# Patient Record
Sex: MALE | Race: OTHER | HISPANIC OR LATINO | Employment: FULL TIME | URBAN - METROPOLITAN AREA
[De-identification: names, ages, dates, MRNs, and addresses within clinical notes are randomized per-mention and may not be internally consistent; named-entity substitution may affect disease eponyms.]

---

## 2023-04-11 PROBLEM — R11.0 NAUSEA: Status: ACTIVE | Noted: 2023-04-11

## 2023-04-11 PROBLEM — E66.9 CLASS 2 OBESITY WITH BODY MASS INDEX (BMI) OF 38.0 TO 38.9 IN ADULT: Status: ACTIVE | Noted: 2023-04-11

## 2023-04-11 PROBLEM — M54.50 LOW BACK PAIN: Status: ACTIVE | Noted: 2023-04-11

## 2023-04-11 PROBLEM — R73.9 HYPERGLYCEMIA: Status: ACTIVE | Noted: 2023-04-11

## 2023-04-11 PROBLEM — E66.9 DIABETES MELLITUS TYPE 2 IN OBESE: Status: ACTIVE | Noted: 2023-04-11

## 2023-04-11 PROBLEM — E66.812 CLASS 2 OBESITY WITH BODY MASS INDEX (BMI) OF 38.0 TO 38.9 IN ADULT: Status: ACTIVE | Noted: 2023-04-11

## 2023-04-11 PROBLEM — M25.529 ELBOW PAIN: Status: ACTIVE | Noted: 2023-04-11

## 2023-04-11 PROBLEM — E55.9 VITAMIN D DEFICIENCY: Status: ACTIVE | Noted: 2023-04-11

## 2023-04-11 PROBLEM — E78.5 HYPERLIPIDEMIA: Status: ACTIVE | Noted: 2023-04-11

## 2023-04-11 PROBLEM — L98.9 SKIN LESIONS: Status: ACTIVE | Noted: 2023-04-11

## 2023-04-11 PROBLEM — M79.646 THUMB PAIN: Status: ACTIVE | Noted: 2023-04-11

## 2023-04-11 PROBLEM — B37.2 CANDIDAL INTERTRIGO: Status: ACTIVE | Noted: 2023-04-11

## 2023-04-11 PROBLEM — E11.69 DIABETES MELLITUS TYPE 2 IN OBESE: Status: ACTIVE | Noted: 2023-04-11

## 2023-04-11 PROBLEM — H00.019 STYE: Status: ACTIVE | Noted: 2023-04-11

## 2023-04-11 PROBLEM — F43.9 SITUATIONAL STRESS: Status: ACTIVE | Noted: 2023-04-11

## 2023-04-11 PROBLEM — E83.52 SERUM CALCIUM ELEVATED: Status: ACTIVE | Noted: 2023-04-11

## 2023-04-11 RX ORDER — FLAXSEED OIL 1000 MG
CAPSULE ORAL
COMMUNITY
End: 2023-06-30 | Stop reason: ALTCHOICE

## 2023-04-11 RX ORDER — SILDENAFIL 100 MG/1
100 TABLET, FILM COATED ORAL DAILY PRN
COMMUNITY
End: 2023-10-11 | Stop reason: SDUPTHER

## 2023-04-11 RX ORDER — DULAGLUTIDE 3 MG/.5ML
1 INJECTION, SOLUTION SUBCUTANEOUS
COMMUNITY
Start: 2018-08-29 | End: 2023-05-30 | Stop reason: SDUPTHER

## 2023-04-11 RX ORDER — ERGOCALCIFEROL 1.25 MG/1
50000 CAPSULE ORAL
COMMUNITY
Start: 2022-07-11 | End: 2023-06-02 | Stop reason: SDUPTHER

## 2023-04-11 RX ORDER — ASPIRIN 81 MG/1
1 TABLET ORAL DAILY
COMMUNITY
Start: 2016-05-31

## 2023-04-11 RX ORDER — ATORVASTATIN CALCIUM 40 MG/1
1 TABLET, FILM COATED ORAL DAILY
COMMUNITY
Start: 2018-08-29 | End: 2023-06-30 | Stop reason: SDUPTHER

## 2023-04-11 RX ORDER — IBUPROFEN 200 MG
CAPSULE ORAL DAILY
COMMUNITY

## 2023-04-11 RX ORDER — BLOOD SUGAR DIAGNOSTIC
STRIP MISCELLANEOUS 2 TIMES DAILY
COMMUNITY
End: 2023-10-11 | Stop reason: SDUPTHER

## 2023-04-11 RX ORDER — METFORMIN HYDROCHLORIDE 1000 MG/1
TABLET ORAL
COMMUNITY
Start: 2016-05-31 | End: 2023-04-26 | Stop reason: SDUPTHER

## 2023-04-11 RX ORDER — MULTIVITAMIN
1 TABLET ORAL DAILY
COMMUNITY

## 2023-05-12 ENCOUNTER — APPOINTMENT (OUTPATIENT)
Dept: PRIMARY CARE | Facility: CLINIC | Age: 63
End: 2023-05-12
Payer: COMMERCIAL

## 2023-05-30 DIAGNOSIS — E11.9 TYPE 2 DIABETES MELLITUS WITHOUT COMPLICATION, WITHOUT LONG-TERM CURRENT USE OF INSULIN (MULTI): Primary | ICD-10-CM

## 2023-05-30 RX ORDER — DULAGLUTIDE 3 MG/.5ML
1 INJECTION, SOLUTION SUBCUTANEOUS
Qty: 2 ML | Refills: 1 | Status: SHIPPED | OUTPATIENT
Start: 2023-05-30 | End: 2023-06-06 | Stop reason: SDUPTHER

## 2023-05-30 NOTE — TELEPHONE ENCOUNTER
----- Message from Tawana Florez sent at 5/26/2023  4:20 PM EDT -----  Regarding: REFILL  Refill request for Trulicity and Vitamin D. Send to [place] [id number] [address].

## 2023-06-02 DIAGNOSIS — E55.9 VITAMIN D DEFICIENCY: Primary | ICD-10-CM

## 2023-06-02 RX ORDER — ERGOCALCIFEROL 1.25 MG/1
50000 CAPSULE ORAL
Qty: 6 CAPSULE | Refills: 1 | Status: SHIPPED | OUTPATIENT
Start: 2023-06-02 | End: 2023-08-30 | Stop reason: SDUPTHER

## 2023-06-06 DIAGNOSIS — E11.9 TYPE 2 DIABETES MELLITUS WITHOUT COMPLICATION, WITHOUT LONG-TERM CURRENT USE OF INSULIN (MULTI): ICD-10-CM

## 2023-06-06 RX ORDER — DULAGLUTIDE 3 MG/.5ML
1 INJECTION, SOLUTION SUBCUTANEOUS
Qty: 2 ML | Refills: 1 | Status: SHIPPED | OUTPATIENT
Start: 2023-06-06 | End: 2023-06-30 | Stop reason: SDUPTHER

## 2023-06-21 ENCOUNTER — TELEPHONE (OUTPATIENT)
Dept: PRIMARY CARE | Facility: CLINIC | Age: 63
End: 2023-06-21
Payer: COMMERCIAL

## 2023-06-23 ENCOUNTER — TELEPHONE (OUTPATIENT)
Dept: PRIMARY CARE | Facility: CLINIC | Age: 63
End: 2023-06-23
Payer: COMMERCIAL

## 2023-06-23 NOTE — TELEPHONE ENCOUNTER
Patient called for status of his PA on Trulicity. I let him know that PCP should be signing it soon and I will call him when I received the signed copy and fax it in so that he is aware of the process.

## 2023-06-28 NOTE — TELEPHONE ENCOUNTER
Called patient and let him know that the PA was faxed over and his medication was approved. Patient picked up his medication last night.

## 2023-06-30 ENCOUNTER — TELEMEDICINE (OUTPATIENT)
Dept: PRIMARY CARE | Facility: CLINIC | Age: 63
End: 2023-06-30
Payer: COMMERCIAL

## 2023-06-30 DIAGNOSIS — E66.1 CLASS 2 DRUG-INDUCED OBESITY WITH SERIOUS COMORBIDITY AND BODY MASS INDEX (BMI) OF 36.0 TO 36.9 IN ADULT: Primary | ICD-10-CM

## 2023-06-30 DIAGNOSIS — E78.5 HYPERLIPIDEMIA, UNSPECIFIED HYPERLIPIDEMIA TYPE: ICD-10-CM

## 2023-06-30 DIAGNOSIS — E11.69 DIABETES MELLITUS TYPE 2 IN OBESE: ICD-10-CM

## 2023-06-30 DIAGNOSIS — E11.9 TYPE 2 DIABETES MELLITUS WITHOUT COMPLICATION, WITHOUT LONG-TERM CURRENT USE OF INSULIN (MULTI): ICD-10-CM

## 2023-06-30 DIAGNOSIS — E66.9 DIABETES MELLITUS TYPE 2 IN OBESE: ICD-10-CM

## 2023-06-30 PROBLEM — N52.1 ERECTILE DYSFUNCTION DUE TO DISEASES CLASSIFIED ELSEWHERE: Status: ACTIVE | Noted: 2023-06-30

## 2023-06-30 PROBLEM — E11.65 TYPE 2 DIABETES MELLITUS WITH HYPERGLYCEMIA (MULTI): Status: ACTIVE | Noted: 2023-06-30

## 2023-06-30 PROCEDURE — 99214 OFFICE O/P EST MOD 30 MIN: CPT | Performed by: NURSE PRACTITIONER

## 2023-06-30 RX ORDER — IBUPROFEN 600 MG/1
600 TABLET ORAL
COMMUNITY
Start: 2015-07-12 | End: 2023-11-29 | Stop reason: WASHOUT

## 2023-06-30 RX ORDER — ATORVASTATIN CALCIUM 40 MG/1
40 TABLET, FILM COATED ORAL DAILY
Qty: 90 TABLET | Refills: 1 | Status: SHIPPED | OUTPATIENT
Start: 2023-06-30 | End: 2023-12-27

## 2023-06-30 RX ORDER — DULAGLUTIDE 3 MG/.5ML
1 INJECTION, SOLUTION SUBCUTANEOUS
Qty: 2 ML | Refills: 2 | Status: SHIPPED | OUTPATIENT
Start: 2023-06-30 | End: 2023-09-15 | Stop reason: DRUGHIGH

## 2023-06-30 RX ORDER — METFORMIN HYDROCHLORIDE 1000 MG/1
TABLET ORAL
Qty: 225 TABLET | Refills: 0 | Status: SHIPPED | OUTPATIENT
Start: 2023-06-30 | End: 2023-10-11 | Stop reason: SDUPTHER

## 2023-06-30 NOTE — PROGRESS NOTES
Subjective   Patient ID: Jason Hollins is a 63 y.o. male who presents for Diabetes.  He has not have insurance for a few months. His old job was having problems with giving him insurance   He changed jobs and started a new job in march and was able to get insured in May   He is working in [facility] and is working in recovery /  His mother in law is in independent living now so it a lot easier at home    The long drive is how he relaxes  He misplaced his one touch monitor and will look for it   He had an eye exam not too long ago and got glasses for distance   He does not have neuropathy sx just his feet fall asleep if he sits too long   I will follow up with him in October .        Review of Systems    Objective   There were no vitals taken for this visit.    Physical Exam    Assessment/Plan   Problem List Items Addressed This Visit       Diabetes mellitus type 2 in obese (CMS/[facility])    Relevant Medications    metFORMIN (Glucophage) 1,000 mg tablet    Other Relevant Orders    Comprehensive Metabolic Panel    Hemoglobin A1C    Cholesterol, LDL Direct    Urinalysis with Reflex Microscopic    Albumin, urine, random    Hyperlipidemia    Relevant Medications    atorvastatin (Lipitor) 40 mg tablet     Other Visit Diagnoses       Class 2 drug-induced obesity with serious comorbidity and body mass index (BMI) of 36.0 to 36.9 in adult    -  Primary

## 2023-07-14 ENCOUNTER — TELEPHONE (OUTPATIENT)
Dept: PRIMARY CARE | Facility: CLINIC | Age: 63
End: 2023-07-14
Payer: COMMERCIAL

## 2023-07-14 DIAGNOSIS — M54.50 ACUTE RIGHT-SIDED LOW BACK PAIN WITHOUT SCIATICA: Primary | ICD-10-CM

## 2023-07-14 RX ORDER — TIZANIDINE HYDROCHLORIDE 4 MG/1
4 CAPSULE, GELATIN COATED ORAL 3 TIMES DAILY
Qty: 40 CAPSULE | Refills: 0 | Status: SHIPPED | OUTPATIENT
Start: 2023-07-14 | End: 2023-10-11 | Stop reason: SDUPTHER

## 2023-07-14 NOTE — TELEPHONE ENCOUNTER
Patient c/o tense muscle on right hip and buttocks area. Hard to sit down and sleep. Patient advised to go to ER or UC. Patient said ok.

## 2023-07-17 ENCOUNTER — TELEPHONE (OUTPATIENT)
Dept: PRIMARY CARE | Facility: CLINIC | Age: 63
End: 2023-07-17
Payer: COMMERCIAL

## 2023-08-17 ENCOUNTER — OFFICE VISIT (OUTPATIENT)
Dept: PRIMARY CARE | Facility: CLINIC | Age: 63
End: 2023-08-17
Payer: COMMERCIAL

## 2023-08-17 VITALS
SYSTOLIC BLOOD PRESSURE: 98 MMHG | HEIGHT: 71 IN | BODY MASS INDEX: 37.69 KG/M2 | DIASTOLIC BLOOD PRESSURE: 62 MMHG | WEIGHT: 269.2 LBS | RESPIRATION RATE: 15 BRPM | TEMPERATURE: 97 F | HEART RATE: 98 BPM | OXYGEN SATURATION: 98 %

## 2023-08-17 DIAGNOSIS — M53.3 BACK PAIN, SACROILIAC: Primary | ICD-10-CM

## 2023-08-17 PROCEDURE — 99213 OFFICE O/P EST LOW 20 MIN: CPT | Performed by: STUDENT IN AN ORGANIZED HEALTH CARE EDUCATION/TRAINING PROGRAM

## 2023-08-17 PROCEDURE — 3074F SYST BP LT 130 MM HG: CPT | Performed by: STUDENT IN AN ORGANIZED HEALTH CARE EDUCATION/TRAINING PROGRAM

## 2023-08-17 PROCEDURE — 3078F DIAST BP <80 MM HG: CPT | Performed by: STUDENT IN AN ORGANIZED HEALTH CARE EDUCATION/TRAINING PROGRAM

## 2023-08-17 PROCEDURE — 3008F BODY MASS INDEX DOCD: CPT | Performed by: STUDENT IN AN ORGANIZED HEALTH CARE EDUCATION/TRAINING PROGRAM

## 2023-08-17 PROCEDURE — 1036F TOBACCO NON-USER: CPT | Performed by: STUDENT IN AN ORGANIZED HEALTH CARE EDUCATION/TRAINING PROGRAM

## 2023-08-17 ASSESSMENT — PATIENT HEALTH QUESTIONNAIRE - PHQ9
1. LITTLE INTEREST OR PLEASURE IN DOING THINGS: NOT AT ALL
SUM OF ALL RESPONSES TO PHQ9 QUESTIONS 1 & 2: 0
2. FEELING DOWN, DEPRESSED OR HOPELESS: NOT AT ALL

## 2023-08-17 ASSESSMENT — LIFESTYLE VARIABLES
AUDIT-C TOTAL SCORE: 0
HOW OFTEN DO YOU HAVE A DRINK CONTAINING ALCOHOL: NEVER
HOW MANY STANDARD DRINKS CONTAINING ALCOHOL DO YOU HAVE ON A TYPICAL DAY: PATIENT DOES NOT DRINK
HOW OFTEN DO YOU HAVE SIX OR MORE DRINKS ON ONE OCCASION: NEVER
SKIP TO QUESTIONS 9-10: 1

## 2023-08-17 ASSESSMENT — ENCOUNTER SYMPTOMS: MUSCULOSKELETAL PAIN: 1

## 2023-08-17 NOTE — PATIENT INSTRUCTIONS
X-ray of the low back today  I have also ordered a CT of the pelvis.  We will inform you once the results are available.  Mary Arenas is also ordered blood work for you.  Please complete the blood work and follow-up with PCP.

## 2023-08-17 NOTE — PROGRESS NOTES
Subjective   Patient ID: Jason Hollins is a pleasant 63 y.o. male who presents for Muscle Pain.  Muscle Pain      Patient is here due to concern for pain in the right buttocks area.  He has been experiencing difficulty with sitting down and sleeping.  Reports that he noted large hard swelling/mass in the right buttocks area about 7 weeks ago.  He was prescribed tizanidine by PCP about a month ago which did not alleviate his pain.  He also sees a chiropractor.  No prior history of back trauma.  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  He also takes ibuprofen 600 mg 2 times a day which is somewhat alleviates the pain.    Review of Systems   All other systems reviewed and are negative.      Visit Vitals  BP 98/62   Pulse 98   Temp 36.1 °C (97 °F)   Resp 15          Objective   Physical Exam  Constitutional:       General: He is not in acute distress.     Appearance: Normal appearance.   HENT:      Head: Normocephalic and atraumatic.   Eyes:      General: No scleral icterus.     Conjunctiva/sclera: Conjunctivae normal.   Cardiovascular:      Rate and Rhythm: Normal rate and regular rhythm.      Heart sounds: Normal heart sounds.   Pulmonary:      Effort: Pulmonary effort is normal.      Breath sounds: Normal breath sounds. No wheezing.   Abdominal:      General: Bowel sounds are normal. There is no distension.      Palpations: Abdomen is soft.      Tenderness: There is no abdominal tenderness.   Musculoskeletal:         General: Swelling (large swelling in the right gluteal region with no overlying skin changes) present. Normal range of motion.      Cervical back: Neck supple.      Right lower leg: No edema.      Left lower leg: No edema.   Lymphadenopathy:      Cervical: No cervical adenopathy.   Skin:     General: Skin is warm and dry.   Neurological:      General: No focal deficit present.      Mental Status: He is alert and oriented to person, place, and time.   Psychiatric:         Mood and  Affect: Mood normal.         Behavior: Behavior normal.         Assessment/Plan   Problem List Items Addressed This Visit    None  Visit Diagnoses       Back pain, sacroiliac    -  Primary    Relevant Orders    XR sacroiliac joints 3+ views    CT pelvis w and wo IV contrast

## 2023-08-30 DIAGNOSIS — E55.9 VITAMIN D DEFICIENCY: ICD-10-CM

## 2023-08-30 RX ORDER — ERGOCALCIFEROL 1.25 MG/1
50000 CAPSULE ORAL
Qty: 6 CAPSULE | Refills: 1 | Status: SHIPPED | OUTPATIENT
Start: 2023-08-30 | End: 2023-10-11 | Stop reason: SDUPTHER

## 2023-09-01 ENCOUNTER — LAB (OUTPATIENT)
Dept: LAB | Facility: LAB | Age: 63
End: 2023-09-01
Payer: COMMERCIAL

## 2023-09-01 DIAGNOSIS — E11.69 DIABETES MELLITUS TYPE 2 IN OBESE: ICD-10-CM

## 2023-09-01 DIAGNOSIS — E66.9 DIABETES MELLITUS TYPE 2 IN OBESE: ICD-10-CM

## 2023-09-01 LAB
ALANINE AMINOTRANSFERASE (SGPT) (U/L) IN SER/PLAS: 39 U/L (ref 10–52)
ALBUMIN (G/DL) IN SER/PLAS: 4.3 G/DL (ref 3.4–5)
ALBUMIN (MG/L) IN URINE: 14.1 MG/L
ALBUMIN/CREATININE (UG/MG) IN URINE: 8.5 UG/MG CRT (ref 0–30)
ALKALINE PHOSPHATASE (U/L) IN SER/PLAS: 81 U/L (ref 33–136)
ANION GAP IN SER/PLAS: 15 MMOL/L (ref 10–20)
APPEARANCE, URINE: CLEAR
ASPARTATE AMINOTRANSFERASE (SGOT) (U/L) IN SER/PLAS: 22 U/L (ref 9–39)
BILIRUBIN TOTAL (MG/DL) IN SER/PLAS: 0.8 MG/DL (ref 0–1.2)
BILIRUBIN, URINE: NEGATIVE
BLOOD, URINE: NEGATIVE
CALCIUM (MG/DL) IN SER/PLAS: 10 MG/DL (ref 8.6–10.6)
CARBON DIOXIDE, TOTAL (MMOL/L) IN SER/PLAS: 24 MMOL/L (ref 21–32)
CHLORIDE (MMOL/L) IN SER/PLAS: 107 MMOL/L (ref 98–107)
CHOLESTEROL IN LDL (MG/DL) IN SER/PLAS BY DIRECT ASSAY: 42 MG/DL (ref 0–129)
COLOR, URINE: YELLOW
CREATININE (MG/DL) IN SER/PLAS: 0.87 MG/DL (ref 0.5–1.3)
CREATININE (MG/DL) IN URINE: 165 MG/DL (ref 20–370)
ESTIMATED AVERAGE GLUCOSE FOR HBA1C: 174 MG/DL
GFR MALE: >90 ML/MIN/1.73M2
GLUCOSE (MG/DL) IN SER/PLAS: 182 MG/DL (ref 74–99)
GLUCOSE, URINE: ABNORMAL MG/DL
HEMOGLOBIN A1C/HEMOGLOBIN TOTAL IN BLOOD: 7.7 %
KETONES, URINE: NEGATIVE MG/DL
LEUKOCYTE ESTERASE, URINE: NEGATIVE
NITRITE, URINE: NEGATIVE
PH, URINE: 5 (ref 5–8)
POTASSIUM (MMOL/L) IN SER/PLAS: 4.1 MMOL/L (ref 3.5–5.3)
PROTEIN TOTAL: 7 G/DL (ref 6.4–8.2)
PROTEIN, URINE: NEGATIVE MG/DL
SODIUM (MMOL/L) IN SER/PLAS: 142 MMOL/L (ref 136–145)
SPECIFIC GRAVITY, URINE: 1.02 (ref 1–1.03)
UREA NITROGEN (MG/DL) IN SER/PLAS: 17 MG/DL (ref 6–23)
UROBILINOGEN, URINE: <2 MG/DL (ref 0–1.9)

## 2023-09-01 PROCEDURE — 82570 ASSAY OF URINE CREATININE: CPT

## 2023-09-01 PROCEDURE — 83721 ASSAY OF BLOOD LIPOPROTEIN: CPT

## 2023-09-01 PROCEDURE — 81003 URINALYSIS AUTO W/O SCOPE: CPT

## 2023-09-01 PROCEDURE — 36415 COLL VENOUS BLD VENIPUNCTURE: CPT

## 2023-09-01 PROCEDURE — 80053 COMPREHEN METABOLIC PANEL: CPT

## 2023-09-01 PROCEDURE — 82043 UR ALBUMIN QUANTITATIVE: CPT

## 2023-09-01 PROCEDURE — 83036 HEMOGLOBIN GLYCOSYLATED A1C: CPT

## 2023-09-07 ENCOUNTER — TELEPHONE (OUTPATIENT)
Dept: PRIMARY CARE | Facility: CLINIC | Age: 63
End: 2023-09-07
Payer: COMMERCIAL

## 2023-09-07 DIAGNOSIS — L02.31 ABSCESS, GLUTEAL: Primary | ICD-10-CM

## 2023-09-12 ENCOUNTER — TELEPHONE (OUTPATIENT)
Dept: PRIMARY CARE | Facility: CLINIC | Age: 63
End: 2023-09-12
Payer: COMMERCIAL

## 2023-09-12 NOTE — TELEPHONE ENCOUNTER
Patient called to find out the results of his CT scan. Patient was given information about getting the MRI and scheduling with a general surgeon. Patient would like to know if he has an enlarged prostate.

## 2023-09-14 DIAGNOSIS — N40.0 PROSTATE ENLARGEMENT: Primary | ICD-10-CM

## 2023-09-15 ENCOUNTER — APPOINTMENT (OUTPATIENT)
Dept: PRIMARY CARE | Facility: CLINIC | Age: 63
End: 2023-09-15
Payer: COMMERCIAL

## 2023-09-15 ENCOUNTER — TELEMEDICINE (OUTPATIENT)
Dept: PHARMACY | Facility: HOSPITAL | Age: 63
End: 2023-09-15
Payer: COMMERCIAL

## 2023-09-15 DIAGNOSIS — E66.9 DIABETES MELLITUS TYPE 2 IN OBESE: ICD-10-CM

## 2023-09-15 DIAGNOSIS — E66.1 CLASS 2 DRUG-INDUCED OBESITY WITH SERIOUS COMORBIDITY AND BODY MASS INDEX (BMI) OF 36.0 TO 36.9 IN ADULT: ICD-10-CM

## 2023-09-15 DIAGNOSIS — E11.9 TYPE 2 DIABETES MELLITUS WITHOUT COMPLICATION, WITHOUT LONG-TERM CURRENT USE OF INSULIN (MULTI): ICD-10-CM

## 2023-09-15 DIAGNOSIS — E11.69 DIABETES MELLITUS TYPE 2 IN OBESE: ICD-10-CM

## 2023-09-15 RX ORDER — DULAGLUTIDE 4.5 MG/.5ML
4.5 INJECTION, SOLUTION SUBCUTANEOUS
Qty: 2 ML | Refills: 2 | Status: SHIPPED | OUTPATIENT
Start: 2023-09-15 | End: 2024-04-27

## 2023-09-15 NOTE — ASSESSMENT & PLAN NOTE
INCREASE to Trulicity 4.5 mg/0.5 mL - once weekly   CONTINUE:   Metformin 1000 mg - 1 tablet every morning and 1.5 tablet every evening

## 2023-09-15 NOTE — PROGRESS NOTES
"Subjective     Patient ID: Jason Hollins is a 63 y.o. male who presents for Diabetes.    Diabetes  He presents for his follow-up diabetic visit. He has type 2 diabetes mellitus. His disease course has been improving. He is compliant with treatment all of the time. He is following a generally unhealthy diet. He participates in exercise intermittently.       Has \"fallen off\" with his diet and exercise since changing jobs. Now works ~1 hour away. Continues to skip breakfast and sometimes lunch. Eats large dinner and then finds himself snacking afterward. Drinks only water and sometimes milk. Discussed finding healthier snack alternatives.     No Known Allergies    Objective     Current DM Pharmacotherapy:   Trulicity 3 mg/0.5 mL - once weekly (Tuesdays)   Metformin 1000 mg - 1 tablet every morning and 1.5 tablet every evening     SECONDARY PREVENTION  - Statin? Yes  - ACE-I/ARB? No  - Aspirin? Yes    Current monitoring regimen:   Patient is using: glucometer    SMBG Readings: Not currently testing    Any episodes of hypoglycemia? No  Hypoglycemia awareness? Yes    There were no vitals taken for this visit.    Pertinent PMH Review:  - PMH of Pancreatitis: No  - PMH/FH of Medullary Thyroid Cancer: No  - PMH of Retinopathy: No  - PMH of Urinary Tract Infections: No    Lab Review  Lab Results   Component Value Date    BILITOT 0.8 09/01/2023    CALCIUM 10.0 09/01/2023    CO2 24 09/01/2023     09/01/2023    CREATININE 0.87 09/01/2023    GLUCOSE 182 (H) 09/01/2023    ALKPHOS 81 09/01/2023    K 4.1 09/01/2023    PROT 7.0 09/01/2023     09/01/2023    AST 22 09/01/2023    ALT 39 09/01/2023    BUN 17 09/01/2023    ANIONGAP 15 09/01/2023    ALBUMIN 4.3 09/01/2023    GFRMALE >90 09/01/2023     Lab Results   Component Value Date    TRIG 90 12/30/2022    CHOL 89 12/30/2022    HDL 31.5 (A) 12/30/2022     Lab Results   Component Value Date    HGBA1C 7.7 (A) 09/01/2023     The ASCVD Risk score (Ravi DK, et al., 2019) failed to " calculate for the following reasons:    The valid total cholesterol range is 130 to 320 mg/dL      Assessment/Plan     Problem List Items Addressed This Visit       Diabetes mellitus type 2 in obese (CMS/[facility])     INCREASE to Trulicity 4.5 mg/0.5 mL - once weekly   CONTINUE:   Metformin 1000 mg - 1 tablet every morning and 1.5 tablet every evening          Other Visit Diagnoses       Class 2 drug-induced obesity with serious comorbidity and body mass index (BMI) of 36.0 to 36.9 in adult        Type 2 diabetes mellitus without complication, without long-term current use of insulin (CMS/[facility])                Type 2 diabetes mellitus, is not at goal. Goal A1C <7%    Follow up: I recommend diabetes care be 4 weeks.    Chloe YouD [facility]  Clinical Pharmacist     Continue all meds under the continuation of care with the referring provider and clinical pharmacy team

## 2023-09-18 ENCOUNTER — TELEPHONE (OUTPATIENT)
Dept: PRIMARY CARE | Facility: CLINIC | Age: 63
End: 2023-09-18
Payer: COMMERCIAL

## 2023-09-29 DIAGNOSIS — R22.2 MASS OF BUTTOCK: Primary | ICD-10-CM

## 2023-09-29 NOTE — PROGRESS NOTES
MRI results were reviewed with the patient. referral for Ortho oncology placed for tissue biopsy.  Patient will call central scheduling to request an appointment.

## 2023-10-06 ENCOUNTER — APPOINTMENT (OUTPATIENT)
Dept: PRIMARY CARE | Facility: CLINIC | Age: 63
End: 2023-10-06
Payer: COMMERCIAL

## 2023-10-09 ENCOUNTER — DOCUMENTATION (OUTPATIENT)
Dept: SURGERY | Facility: CLINIC | Age: 63
End: 2023-10-09
Payer: COMMERCIAL

## 2023-10-09 NOTE — PROGRESS NOTES
General Surgery Update    The patient was seen by me at my  [place] office on 9/27/2023 on referral from .   Please see the note(s) in legacy  [employer] for details.    The patient had the MRI performed on 9/29/2023.  I personally reviewed the report and the images.  As suspected, this revealed a complex heterogeneously enhancing, partially necrotic intramuscular right gluteus una mass concerning for malignancy.  Orthopedic oncology consultation was recommended, and I agree.    The referral was made by the patient's primary physician Dr. Durbin on 9/29/2023.  The patient is scheduled to see Dr. Mikal Hawkins from orthopedic oncology on 10/25/2023 for consultation.  The patient will follow with me only as needed.

## 2023-10-11 ENCOUNTER — TELEMEDICINE (OUTPATIENT)
Dept: PRIMARY CARE | Facility: CLINIC | Age: 63
End: 2023-10-11
Payer: COMMERCIAL

## 2023-10-11 DIAGNOSIS — N52.9 ERECTILE DYSFUNCTION, UNSPECIFIED ERECTILE DYSFUNCTION TYPE: ICD-10-CM

## 2023-10-11 DIAGNOSIS — M54.50 ACUTE RIGHT-SIDED LOW BACK PAIN WITHOUT SCIATICA: ICD-10-CM

## 2023-10-11 DIAGNOSIS — M79.18 BUTTOCK PAIN: ICD-10-CM

## 2023-10-11 DIAGNOSIS — E55.9 VITAMIN D DEFICIENCY: ICD-10-CM

## 2023-10-11 DIAGNOSIS — E66.1 CLASS 2 DRUG-INDUCED OBESITY WITH SERIOUS COMORBIDITY AND BODY MASS INDEX (BMI) OF 36.0 TO 36.9 IN ADULT: ICD-10-CM

## 2023-10-11 DIAGNOSIS — E66.1 CLASS 2 DRUG-INDUCED OBESITY WITH SERIOUS COMORBIDITY AND BODY MASS INDEX (BMI) OF 37.0 TO 37.9 IN ADULT: ICD-10-CM

## 2023-10-11 DIAGNOSIS — R22.2 MASS OF BUTTOCK: Primary | ICD-10-CM

## 2023-10-11 DIAGNOSIS — E66.9 DIABETES MELLITUS TYPE 2 IN OBESE: ICD-10-CM

## 2023-10-11 DIAGNOSIS — E11.69 DIABETES MELLITUS TYPE 2 IN OBESE: ICD-10-CM

## 2023-10-11 PROCEDURE — 99214 OFFICE O/P EST MOD 30 MIN: CPT | Performed by: NURSE PRACTITIONER

## 2023-10-11 RX ORDER — SILDENAFIL 100 MG/1
100 TABLET, FILM COATED ORAL DAILY PRN
Qty: 10 TABLET | Refills: 2 | Status: SHIPPED | OUTPATIENT
Start: 2023-10-11 | End: 2024-04-27

## 2023-10-11 RX ORDER — GABAPENTIN 100 MG/1
CAPSULE ORAL
Qty: 90 CAPSULE | Refills: 1 | Status: SHIPPED | OUTPATIENT
Start: 2023-10-11 | End: 2024-01-04 | Stop reason: SDUPTHER

## 2023-10-11 RX ORDER — IBUPROFEN 800 MG/1
800 TABLET ORAL 3 TIMES DAILY PRN
Qty: 180 TABLET | Refills: 3 | Status: SHIPPED | OUTPATIENT
Start: 2023-10-11 | End: 2024-04-27

## 2023-10-11 RX ORDER — METFORMIN HYDROCHLORIDE 1000 MG/1
TABLET ORAL
Qty: 225 TABLET | Refills: 0 | Status: SHIPPED | OUTPATIENT
Start: 2023-10-11 | End: 2024-04-27

## 2023-10-11 RX ORDER — TIZANIDINE HYDROCHLORIDE 4 MG/1
4 CAPSULE, GELATIN COATED ORAL 3 TIMES DAILY
Qty: 40 CAPSULE | Refills: 2 | Status: SHIPPED | OUTPATIENT
Start: 2023-10-11 | End: 2024-04-27

## 2023-10-11 RX ORDER — GABAPENTIN 300 MG/1
CAPSULE ORAL
Qty: 90 CAPSULE | Refills: 2 | Status: SHIPPED | OUTPATIENT
Start: 2023-10-11 | End: 2024-01-05 | Stop reason: SDUPTHER

## 2023-10-11 RX ORDER — ERGOCALCIFEROL 1.25 MG/1
50000 CAPSULE ORAL
Qty: 6 CAPSULE | Refills: 1 | Status: SHIPPED | OUTPATIENT
Start: 2023-10-11 | End: 2024-04-27

## 2023-10-11 RX ORDER — BLOOD SUGAR DIAGNOSTIC
STRIP MISCELLANEOUS
Qty: 100 STRIP | Refills: 3 | Status: SHIPPED | OUTPATIENT
Start: 2023-10-11 | End: 2024-04-27

## 2023-10-11 NOTE — PROGRESS NOTES
Subjective   Patient ID: Jason Hollins is a 63 y.o. male who presents for Tailbone Pain.    HPI   The patient has had a mass in his right gluteus muscle since the summer . It grew larger to the size of a golf ball . It is extremely painful when he is sitting and also standing   His leg is numb or feels that it is going to sleep and then he feels as if it is sweating . Sometimes the leg feels weak   He was seen by Dr Durbin and had a ct of the pelvis ordered showing a prominent cystic mass and also an enlarged prostate .  It was advised that he get an mri and so it was ordered and showed a 14 .9 x 12.7. x 8.3 encapsulated intramuscular solid lesion of the right gluteal maximm muscle  There were areas compatible with necrosis and also marrow edema . The impression is that this may be a malignant lesion and to see ortho oncology for tissue sampling .  He needs diabetic supplies   The trulicity one touch strips the meter and also metformin   His last aic was a little elevated but that may also reflect his pain his tumor         Review of Systems    Objective   There were no vitals taken for this visit.    Physical Exam    Assessment/Plan   Problem List Items Addressed This Visit             ICD-10-CM    Diabetes mellitus type 2 in obese (CMS/[facility]) E11.69, E66.9    Relevant Medications    dulaglutide (TRULICITY) 4.5 mg/0.5 mL pen injector    metFORMIN (Glucophage) 1,000 mg tablet    OneTouch Ultra Test strip    Other Relevant Orders    Home blood glucose meter    Low back pain M54.50    Relevant Medications    tiZANidine (Zanaflex) 4 mg capsule    gabapentin (Neurontin) 300 mg capsule    gabapentin (Neurontin) 100 mg capsule    Other Relevant Orders    Referral to Pain Medicine    Vitamin D deficiency E55.9    Relevant Medications    ergocalciferol (Vitamin D-2) 1.25 MG (29531 UT) capsule     Other Visit Diagnoses         Codes    Mass of buttock    -  Primary R22.2    Class 2 drug-induced obesity with serious comorbidity  and body mass index (BMI) of 36.0 to 36.9 in adult     E66.1, Z68.36    Buttock pain     M79.18    Relevant Medications    ibuprofen 800 mg tablet    gabapentin (Neurontin) 300 mg capsule    gabapentin (Neurontin) 100 mg capsule    Other Relevant Orders    Referral to Pain Medicine    Erectile dysfunction, unspecified erectile dysfunction type     N52.9    Relevant Medications    sildenafil (Viagra) 100 mg tablet    Class 2 drug-induced obesity with serious comorbidity and body mass index (BMI) of 37.0 to 37.9 in adult     E66.1, Z68.37

## 2023-10-12 NOTE — PATIENT INSTRUCTIONS
Jason   I have written the 100 mg gabapentin for you to start with then you can transition to the 300 mg gabapentin   You can use ibuprofen ,tylenol or acetaminophen 500 mg and also gabapentin 100 and then 300 mg  for the pain   You can sit on some cold compresses if that helps to numb the area   It is a journey you are on and this is not easy    ,there is the [facility] pain clinic you can go to for pain medication as well if it is a tumor that is cancerous origin  Ask the oncologist for a referral   You can call for a pain management referral as well to help with the pain [id number]

## 2023-10-13 ENCOUNTER — TELEMEDICINE (OUTPATIENT)
Dept: PHARMACY | Facility: HOSPITAL | Age: 63
End: 2023-10-13
Payer: COMMERCIAL

## 2023-10-13 DIAGNOSIS — E66.1 CLASS 2 DRUG-INDUCED OBESITY WITH SERIOUS COMORBIDITY AND BODY MASS INDEX (BMI) OF 36.0 TO 36.9 IN ADULT: ICD-10-CM

## 2023-10-13 DIAGNOSIS — E11.69 DIABETES MELLITUS TYPE 2 IN OBESE: ICD-10-CM

## 2023-10-13 DIAGNOSIS — E66.9 DIABETES MELLITUS TYPE 2 IN OBESE: ICD-10-CM

## 2023-10-13 NOTE — ASSESSMENT & PLAN NOTE
CONTINUE:   Trulicity 4.5 mg/0.5 mL - once weekly (Tuesdays)   Metformin 1000 mg - 1 tablet every morning and 1.5 tablet every evening

## 2023-10-13 NOTE — PROGRESS NOTES
Subjective     Patient ID: Jason Hollins is a 63 y.o. male who presents for No chief complaint on file..    Diabetes  He presents for his follow-up diabetic visit. He has type 2 diabetes mellitus. His disease course has been improving. He is compliant with treatment all of the time. He is following a generally unhealthy diet. He participates in exercise intermittently.     Diet has improved, exercise limited by schedule and cyst. Gabapentin helped greatly with the pain and has been able to get 5-6 hours of sleep per night.     Not on File    Objective     Current DM Pharmacotherapy:   Trulicity 4.5 mg/0.5 mL - once weekly (Tuesdays)   Metformin 1000 mg - 1 tablet every morning and 1.5 tablet every evening     SECONDARY PREVENTION  - Statin? Yes  - ACE-I/ARB? No  - Aspirin? Yes    Current monitoring regimen:   Patient is using: glucometer    SMBG Readings: Not currently testing    Any episodes of hypoglycemia? No  Hypoglycemia awareness? Yes    There were no vitals taken for this visit.    Pertinent PMH Review:  - PMH of Pancreatitis: No  - PMH/FH of Medullary Thyroid Cancer: No  - PMH of Retinopathy: No  - PMH of Urinary Tract Infections: No    Lab Review  Lab Results   Component Value Date    BILITOT 0.8 09/01/2023    CALCIUM 10.0 09/01/2023    CO2 24 09/01/2023     09/01/2023    CREATININE 0.87 09/01/2023    GLUCOSE 182 (H) 09/01/2023    ALKPHOS 81 09/01/2023    K 4.1 09/01/2023    PROT 7.0 09/01/2023     09/01/2023    AST 22 09/01/2023    ALT 39 09/01/2023    BUN 17 09/01/2023    ANIONGAP 15 09/01/2023    ALBUMIN 4.3 09/01/2023    GFRMALE >90 09/01/2023     Lab Results   Component Value Date    TRIG 90 12/30/2022    CHOL 89 12/30/2022    HDL 31.5 (A) 12/30/2022     Lab Results   Component Value Date    HGBA1C 7.7 (A) 09/01/2023     The ASCVD Risk score (Ravi DK, et al., 2019) failed to calculate for the following reasons:    The valid total cholesterol range is 130 to 320 mg/dL      Assessment/Plan      Problem List Items Addressed This Visit       Diabetes mellitus type 2 in obese (CMS/[facility])     CONTINUE:   Trulicity 4.5 mg/0.5 mL - once weekly (Tuesdays)   Metformin 1000 mg - 1 tablet every morning and 1.5 tablet every evening           Other Visit Diagnoses       Class 2 drug-induced obesity with serious comorbidity and body mass index (BMI) of 36.0 to 36.9 in adult                Type 2 diabetes mellitus, is not at goal. Goal A1C <7%    Follow up: I recommend diabetes care be 8 weeks.    Chloe YouD [facility]  Clinical Pharmacist     Continue all meds under the continuation of care with the referring provider and clinical pharmacy team

## 2023-10-24 DIAGNOSIS — M62.89 MUSCLE MASS: ICD-10-CM

## 2023-10-24 PROBLEM — B34.9 VIRAL ILLNESS: Status: ACTIVE | Noted: 2023-10-24

## 2023-10-25 ENCOUNTER — ANCILLARY PROCEDURE (OUTPATIENT)
Dept: RADIOLOGY | Facility: CLINIC | Age: 63
End: 2023-10-25
Payer: COMMERCIAL

## 2023-10-25 ENCOUNTER — OFFICE VISIT (OUTPATIENT)
Dept: ORTHOPEDIC SURGERY | Facility: CLINIC | Age: 63
End: 2023-10-25
Payer: COMMERCIAL

## 2023-10-25 VITALS — HEIGHT: 70 IN | BODY MASS INDEX: 37.37 KG/M2 | WEIGHT: 261 LBS

## 2023-10-25 DIAGNOSIS — R22.2 MASS OF BUTTOCK: ICD-10-CM

## 2023-10-25 DIAGNOSIS — M62.89 MUSCLE MASS: ICD-10-CM

## 2023-10-25 PROCEDURE — 99204 OFFICE O/P NEW MOD 45 MIN: CPT | Performed by: STUDENT IN AN ORGANIZED HEALTH CARE EDUCATION/TRAINING PROGRAM

## 2023-10-25 PROCEDURE — 72190 X-RAY EXAM OF PELVIS: CPT | Mod: FY

## 2023-10-25 PROCEDURE — 1036F TOBACCO NON-USER: CPT | Performed by: STUDENT IN AN ORGANIZED HEALTH CARE EDUCATION/TRAINING PROGRAM

## 2023-10-25 PROCEDURE — 3008F BODY MASS INDEX DOCD: CPT | Performed by: STUDENT IN AN ORGANIZED HEALTH CARE EDUCATION/TRAINING PROGRAM

## 2023-10-25 PROCEDURE — 3051F HG A1C>EQUAL 7.0%<8.0%: CPT | Performed by: STUDENT IN AN ORGANIZED HEALTH CARE EDUCATION/TRAINING PROGRAM

## 2023-10-25 PROCEDURE — 99214 OFFICE O/P EST MOD 30 MIN: CPT | Performed by: STUDENT IN AN ORGANIZED HEALTH CARE EDUCATION/TRAINING PROGRAM

## 2023-10-25 PROCEDURE — 72190 X-RAY EXAM OF PELVIS: CPT | Performed by: RADIOLOGY

## 2023-10-25 ASSESSMENT — PAIN SCALES - GENERAL: PAINLEVEL_OUTOF10: 6

## 2023-10-25 ASSESSMENT — ENCOUNTER SYMPTOMS
LOSS OF SENSATION IN FEET: 0
OCCASIONAL FEELINGS OF UNSTEADINESS: 0
DEPRESSION: 0

## 2023-10-25 ASSESSMENT — PAIN - FUNCTIONAL ASSESSMENT: PAIN_FUNCTIONAL_ASSESSMENT: 0-10

## 2023-10-25 NOTE — PROGRESS NOTES
Orthopaedic Surgery  New Patient Clinic Note    Jason Hollins [id number] October 25, 2023    Reason for Consult: R gluteal mass    HPI: 63 y.o. male presenting with large R gluteal mass.  First noticed in June.  States that it has been growing since then.  He initially presented to his Doctor told him to present to his PCP.  His PCP ordered local imaging studies including CT scan and MRI which was concerning for potential neoplasm for which she was referred to orthopedic oncology clinic.  He notes that the mass is very painful when standing and ambulating.  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  He does also endorse some numbness and tingling down his right leg.  No skin issues.  He does not have a personal history of cancer but notes that his father had a bone cancer which he passed away from though he does not know any specifics.  Past medical history includes diabetes but no other medical history.  He has no systemic symptoms.  No recent respiratory issues.    ROS:  A complete review of systems was conducted, pertinent only to the HPI noted above.     Constitutional: None     Eyes: No additions to above history     Ears, Nose, Throat: No additions to above history     Cardiovascular: No additions to above history     Respiratory: No additions to above history     GI: No additions to above history     : No additions to above history     Skin/Neuro: No additions to above history     Endocrine/Heme/Lymph: No additions to above history     Immunologic: No additions to above history     Psychiatric: No additions to above history     Musculoskeletal: see above    PMH:   Past Medical History:   Diagnosis Date    Morbid (severe) obesity due to excess calories (CMS/HCC)     Severe obesity (BMI 35.0-35.9 with comorbidity)    Morbid (severe) obesity due to excess calories (CMS/HCC) 05/31/2016    Severe obesity (BMI 35.0-39.9) with comorbidity    Personal history of other endocrine, nutritional  and metabolic disease     History of diabetes mellitus    Personal history of other endocrine, nutritional and metabolic disease     History of hyperlipidemia    No history of DVT. No personal history of cancer    PSH:    Past Surgical History:   Procedure Laterality Date    OTHER SURGICAL HISTORY  05/31/2016    History Of Prior Surgery       SHx: No smoking. No Alcohol, No IVDU    Meds:   Current Outpatient Medications on File Prior to Visit   Medication Sig Dispense Refill    aspirin 81 mg EC tablet Take 1 tablet (81 mg) by mouth once daily. AS DIRECTED.      atorvastatin (Lipitor) 40 mg tablet Take 1 tablet (40 mg) by mouth once daily. 90 tablet 1    blood sugar diagnostic (Blood Glucose Test) strip once daily. One Drop Test In Vitro Strip; Test      dulaglutide (Trulicity) 4.5 mg/0.5 mL pen injector Inject 4.5 mg under the skin 1 (one) time per week. 2 mL 2    dulaglutide (TRULICITY) 4.5 mg/0.5 mL pen injector INJECT ONE PEN (4.5 MG) UNDER THE SKIN ONCE WEEKLY 2 mL 2    ergocalciferol (Vitamin D-2) 1.25 MG (55826 UT) capsule Take 1 capsule (50,000 Units) by mouth every 14 (fourteen) days. twice monthly on the 15th and the 30 th for vitamin d deficiency 6 capsule 1    fish oil concentrate (Omega-3) 120-180 mg capsule Take 3 capsules (3,000 mg) by mouth.      gabapentin (Neurontin) 100 mg capsule Take one capsule and increase by 1 capsule every 4 days until taking 3 capsules 90 capsule 1    gabapentin (Neurontin) 300 mg capsule Use the 100 mg capsule to titrate to 300 mg , then use the 300 mg capsule and take 1 to 2 capsules hs for pain 90 capsule 2    ibuprofen 600 mg tablet Take 1 tablet (600 mg) by mouth.      ibuprofen 800 mg tablet Take 1 tablet (800 mg) by mouth 3 times a day as needed for mild pain (1 - 3) (pain). 180 tablet 3    metFORMIN (Glucophage) 1,000 mg tablet TAKE 1 TABLET BY MOUTH EVERY MORNING AND 1.5 TABLETS BY MOUTH EVERY EVENING , 225 tablet 0    multivitamin tablet Take 1 tablet by mouth once  daily.      OneTouch Ultra Test strip Test blood sugar once daily 100 strip 3    sildenafil (Viagra) 100 mg tablet Take 1 tablet (100 mg) by mouth once daily as needed (1 hour before needed). 10 tablet 2    tiZANidine (Zanaflex) 4 mg capsule Take 1 capsule (4 mg) by mouth 3 times a day. 40 capsule 2     No current facility-administered medications on file prior to visit.       PHYSICAL EXAM    GEN: AaOx4, NAD  HEENT: normocephalic atraumatic, EOMI, MMM, pupils equal and round  PSYCH: appropriate mood and affect  RESP: nonlabored breathing on RA  CARDIAC: Extremities WWP, RRR to peripheral palpation  NEURO: CN 2-12 grossly intact    RLE:   -There is a large 15 x 10 cm mass within the right gluteal compartment that is firm, nonmobile, and tender to the touch.  Skin well appearing. No erythema, swelling, bruising, open wounds.  - Hip ROM from 0-110. Ambulates without abnormal gait  -Motor intact in DF/PF/EHL/FHL, SILT in saph/sural/SPN/DPN/tibial distributions  -Foot wwp, brisk cap refill, 2+ DP/PT pulse    Imaging:    XR of the right hip, obtained and personally reviewed today, shows area of soft tissue swelling without any bony abnormality.    MRI with and without contrast of the pelvis and right hip was personally reviewed demonstrates a large heterogenous enhancing mass within the gluteus una muscle deep to the fascial compartment.  There is peripheral enhancement as well as areas of central necrosis.  It appears to be adjacent to but not encasing the sciatic nerve.    Assessment/Plan:  63 y.o. male presenting with right gluteal mass first noticed in June and enlarging since.  Discussed with the patient that clinical and imaging findings are concerning for potential aggressive neoplastic process.  Given this concern, we have recommended that he go for a stat CT-guided biopsy in order to ensure accurate biopsy with areas of necrosis.  Long discussion with the patient that findings from this biopsy will dictate  treatment down the road but that there is significant concern that this may be an aggressive cancer.  We will work to get this biopsy done as soon as possible and have the patient return to discuss the results once completed.

## 2023-10-27 ENCOUNTER — TUMOR BOARD CONFERENCE (OUTPATIENT)
Dept: HEMATOLOGY/ONCOLOGY | Facility: HOSPITAL | Age: 63
End: 2023-10-27
Payer: COMMERCIAL

## 2023-10-30 NOTE — TUMOR BOARD NOTE
Tumor Board Documentation    Jason Hollins was presented by Mikal Hawkins MD at our Tumor Board on 10/30/2023, which included representatives from Medical oncology, Radiation oncology, Surgical oncology, Pathology.    Jason currently presents as New patient initial presentation with history of the following treatments: None.    Additionally, we reviewed previous medical and familial history, history of present illness, and recent lab results along with all available histopathologic and imaging studies. The tumor board considered available treatment options and made the following recommendations:  Biopsy    The following procedures/referrals were also placed: No orders of the defined types were placed in this encounter.      Clinical Trial Status: Not discussed     National site-specific guidelines were discussed with respect to the case.

## 2023-11-13 ENCOUNTER — HOSPITAL ENCOUNTER (OUTPATIENT)
Dept: RADIOLOGY | Facility: HOSPITAL | Age: 63
Discharge: HOME | End: 2023-11-13
Payer: COMMERCIAL

## 2023-11-13 VITALS
SYSTOLIC BLOOD PRESSURE: 138 MMHG | BODY MASS INDEX: 38.51 KG/M2 | RESPIRATION RATE: 18 BRPM | HEART RATE: 100 BPM | OXYGEN SATURATION: 96 % | WEIGHT: 269 LBS | TEMPERATURE: 98.6 F | DIASTOLIC BLOOD PRESSURE: 85 MMHG | HEIGHT: 70 IN

## 2023-11-13 DIAGNOSIS — R22.2 MASS OF BUTTOCK: ICD-10-CM

## 2023-11-13 PROCEDURE — 77012 CT SCAN FOR NEEDLE BIOPSY: CPT

## 2023-11-13 PROCEDURE — 20206 BIOPSY MUSCLE PERQ NEEDLE: CPT | Performed by: RADIOLOGY

## 2023-11-13 PROCEDURE — 88307 TISSUE EXAM BY PATHOLOGIST: CPT | Performed by: PATHOLOGY

## 2023-11-13 PROCEDURE — 77012 CT SCAN FOR NEEDLE BIOPSY: CPT | Performed by: RADIOLOGY

## 2023-11-13 PROCEDURE — 88341 IMHCHEM/IMCYTCHM EA ADD ANTB: CPT | Mod: TC,SUR | Performed by: STUDENT IN AN ORGANIZED HEALTH CARE EDUCATION/TRAINING PROGRAM

## 2023-11-13 PROCEDURE — 88342 IMHCHEM/IMCYTCHM 1ST ANTB: CPT | Performed by: PATHOLOGY

## 2023-11-13 PROCEDURE — 88341 IMHCHEM/IMCYTCHM EA ADD ANTB: CPT | Performed by: PATHOLOGY

## 2023-11-13 PROCEDURE — 88341 IMHCHEM/IMCYTCHM EA ADD ANTB: CPT | Mod: TC | Performed by: STUDENT IN AN ORGANIZED HEALTH CARE EDUCATION/TRAINING PROGRAM

## 2023-11-13 RX ORDER — MIDAZOLAM HYDROCHLORIDE 1 MG/ML
INJECTION, SOLUTION INTRAMUSCULAR; INTRAVENOUS
Status: DISCONTINUED
Start: 2023-11-13 | End: 2023-11-13 | Stop reason: WASHOUT

## 2023-11-13 RX ORDER — FENTANYL CITRATE 50 UG/ML
INJECTION, SOLUTION INTRAMUSCULAR; INTRAVENOUS
Status: DISCONTINUED
Start: 2023-11-13 | End: 2023-11-13 | Stop reason: WASHOUT

## 2023-11-13 NOTE — DISCHARGE INSTRUCTIONS
Okay to remove band aid tomorrow  Okay to shower tomorrow  Follow up with Dr. Hawkins in 10 days for results  Resume normal diet   Notify MD with any s/s of infection or active bleeding

## 2023-11-13 NOTE — POST-PROCEDURE NOTE
Interventional Radiology Brief Postprocedure Note    Attending: Alessandro Domingo MD    Assistant: None    Diagnosis: right gluteal mass    Description of procedure: CT guided right gluteal mass biopsy     Anesthesia:  Local    Complications: None    Estimated Blood Loss: minimal    Medications (Filter: Administrations occurring from 0931 to 1018 on 11/13/23) As of 11/13/23 1018      None          ID Type Source Tests Collected by Time   1 : CT GUIDED RIGHT GLUTEAL MASS BIOPSY Tissue SOFT TISSUE MASS BIOPSY SURGICAL PATHOLOGY EXAM Alessandro Domingo MD 11/13/2023 0957         See detailed result report with images in PACS.    The patient tolerated the procedure well without incident or complication and is in stable condition.

## 2023-11-13 NOTE — PRE-PROCEDURE NOTE
Interventional Radiology Preprocedure Note    Indication for procedure: The encounter diagnosis was Mass of buttock.    Relevant review of systems: NA    Relevant Labs:   Lab Results   Component Value Date    CREATININE 0.87 09/01/2023       Planned Sedation/Anesthesia: Moderate    Airway assessment: normal    Directed physical examination:    ADDIE    Mallampati: I (soft palate, uvula, fauces, and tonsillar pillars visible)    ASA Score: ASA 1 - Normal health patient    Benefits, risks and alternatives of procedure and planned sedation have been discussed with the patient and/or their representative. All questions answered and they agree to proceed.

## 2023-11-17 ENCOUNTER — ANCILLARY PROCEDURE (OUTPATIENT)
Dept: RADIOLOGY | Facility: CLINIC | Age: 63
End: 2023-11-17
Payer: COMMERCIAL

## 2023-11-17 ENCOUNTER — OFFICE VISIT (OUTPATIENT)
Dept: ORTHOPEDIC SURGERY | Facility: HOSPITAL | Age: 63
End: 2023-11-17
Payer: COMMERCIAL

## 2023-11-17 VITALS — WEIGHT: 269 LBS | HEIGHT: 70 IN | BODY MASS INDEX: 38.51 KG/M2

## 2023-11-17 DIAGNOSIS — C49.9 SOFT TISSUE SARCOMA (MULTI): ICD-10-CM

## 2023-11-17 DIAGNOSIS — C49.9 SOFT TISSUE SARCOMA (MULTI): Primary | ICD-10-CM

## 2023-11-17 PROCEDURE — 3008F BODY MASS INDEX DOCD: CPT | Performed by: STUDENT IN AN ORGANIZED HEALTH CARE EDUCATION/TRAINING PROGRAM

## 2023-11-17 PROCEDURE — 99214 OFFICE O/P EST MOD 30 MIN: CPT | Performed by: STUDENT IN AN ORGANIZED HEALTH CARE EDUCATION/TRAINING PROGRAM

## 2023-11-17 PROCEDURE — 71250 CT THORAX DX C-: CPT

## 2023-11-17 PROCEDURE — 1036F TOBACCO NON-USER: CPT | Performed by: STUDENT IN AN ORGANIZED HEALTH CARE EDUCATION/TRAINING PROGRAM

## 2023-11-17 PROCEDURE — 99214 OFFICE O/P EST MOD 30 MIN: CPT | Mod: 25 | Performed by: STUDENT IN AN ORGANIZED HEALTH CARE EDUCATION/TRAINING PROGRAM

## 2023-11-17 PROCEDURE — 3051F HG A1C>EQUAL 7.0%<8.0%: CPT | Performed by: STUDENT IN AN ORGANIZED HEALTH CARE EDUCATION/TRAINING PROGRAM

## 2023-11-17 ASSESSMENT — PAIN - FUNCTIONAL ASSESSMENT: PAIN_FUNCTIONAL_ASSESSMENT: 0-10

## 2023-11-17 ASSESSMENT — PAIN SCALES - GENERAL: PAINLEVEL_OUTOF10: 4

## 2023-11-17 ASSESSMENT — ENCOUNTER SYMPTOMS
OCCASIONAL FEELINGS OF UNSTEADINESS: 0
DEPRESSION: 0
LOSS OF SENSATION IN FEET: 0

## 2023-11-17 NOTE — PROGRESS NOTES
Orthopaedic Surgery Clinic Progress Note:    CC: Right gluteal mass biopsy    S: 63 y.o. male with a right gluteal mass which we previously saw on MRI.  After his last visit we referred him for a biopsy.  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  For that reason we asked the patient to come in so we can discuss the neck steps in terms of imaging as well as referrals.  Patient states that he tolerated the biopsy well.  Is been no issues with the biopsy tract.  There is no change in size of the mass.    Objective:    Exam:  Gen: alert, appropriately conversational, no apparent distress  Chest: symmetric chest rise, non-labored breathing  Heart: RRR to peripheral palpation    RLE:   -There is a large 15 x 10 cm mass within the right gluteal compartment that is firm, nonmobile, and tender to the touch.  Skin well appearing. No erythema, swelling, bruising, open wounds.  - Hip ROM from 0-110. Ambulates without abnormal gait  -Motor intact in DF/PF/EHL/FHL, SILT in saph/sural/SPN/DPN/tibial distributions  -Foot wwp, brisk cap refill, 2+ DP/PT pulse  -Biopsy tract is healed without any evidence of drainage    Imaging:  XR of the right hip, obtained and personally reviewed today, shows area of soft tissue swelling without any bony abnormality.     MRI with and without contrast of the pelvis and right hip was personally reviewed demonstrates a large heterogenous enhancing mass within the gluteus una muscle deep to the fascial compartment.  There is peripheral enhancement as well as areas of central necrosis.  It appears to be adjacent to but not encasing the sciatic nerve.    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  We discussed preoperative versus postoperative radiation therapy as well as the logic behind both approaches.  We discussed the preoperative radiation therapy deftly has a higher chance of wound complications but the more targeted with a low field as well as a lower dose.  Postoperative radiation therapy has a lower chance wound complications with a higher doses with higher field.  We placed referrals for medical oncology as well as radiation oncology and he is getting get a CT scan of his chest.  He will be discussed at tumor board after the CT scan of the chest is obtained and my plan is to see him back after his radiation is complete to the plan for surgery.

## 2023-11-24 ENCOUNTER — TUMOR BOARD CONFERENCE (OUTPATIENT)
Dept: HEMATOLOGY/ONCOLOGY | Facility: HOSPITAL | Age: 63
End: 2023-11-24
Payer: COMMERCIAL

## 2023-11-24 NOTE — TUMOR BOARD NOTE
Tumor Board Documentation    Jason Hollins was presented by Mikal Hawkins MD at our Tumor Board on 11/24/2023, which included representatives from Medical oncology, Surgical oncology, Pathology, Radiology.    Jason currently presents as Current patient with history of the following treatments: None.    Additionally, we reviewed previous medical and familial history, history of present illness, and recent lab results along with all available histopathologic and imaging studies. The tumor board considered available treatment options and made the following recommendations:  Surgery, Concurrent chemo-radiation therapy (Preop RT/systemic tx followed by surgery)    The following procedures/referrals were also placed: No orders of the defined types were placed in this encounter.      Clinical Trial Status: None available     National site-specific guidelines were discussed with respect to the case.

## 2023-11-28 ENCOUNTER — HOSPITAL ENCOUNTER (OUTPATIENT)
Dept: RADIATION ONCOLOGY | Facility: CLINIC | Age: 63
Setting detail: RADIATION/ONCOLOGY SERIES
Discharge: HOME | End: 2023-11-28
Payer: COMMERCIAL

## 2023-11-28 ENCOUNTER — LAB (OUTPATIENT)
Dept: LAB | Facility: LAB | Age: 63
End: 2023-11-28
Payer: COMMERCIAL

## 2023-11-28 VITALS
RESPIRATION RATE: 18 BRPM | WEIGHT: 271 LBS | BODY MASS INDEX: 38.88 KG/M2 | HEART RATE: 94 BPM | DIASTOLIC BLOOD PRESSURE: 72 MMHG | SYSTOLIC BLOOD PRESSURE: 126 MMHG | TEMPERATURE: 97.3 F | OXYGEN SATURATION: 96 %

## 2023-11-28 DIAGNOSIS — E66.9 DIABETES MELLITUS TYPE 2 IN OBESE: ICD-10-CM

## 2023-11-28 DIAGNOSIS — N52.1 ERECTILE DYSFUNCTION DUE TO DISEASES CLASSIFIED ELSEWHERE: ICD-10-CM

## 2023-11-28 DIAGNOSIS — E11.69 DIABETES MELLITUS TYPE 2 IN OBESE: ICD-10-CM

## 2023-11-28 DIAGNOSIS — C49.9 SOFT TISSUE SARCOMA (MULTI): ICD-10-CM

## 2023-11-28 DIAGNOSIS — N52.1 ERECTILE DYSFUNCTION DUE TO DISEASES CLASSIFIED ELSEWHERE: Primary | ICD-10-CM

## 2023-11-28 LAB
ANION GAP SERPL CALC-SCNC: 12 MMOL/L (ref 10–20)
BUN SERPL-MCNC: 15 MG/DL (ref 6–23)
CALCIUM SERPL-MCNC: 9.9 MG/DL (ref 8.6–10.3)
CHLORIDE SERPL-SCNC: 108 MMOL/L (ref 98–107)
CO2 SERPL-SCNC: 28 MMOL/L (ref 21–32)
CREAT SERPL-MCNC: 0.93 MG/DL (ref 0.5–1.3)
EST. AVERAGE GLUCOSE BLD GHB EST-MCNC: 157 MG/DL
GFR SERPL CREATININE-BSD FRML MDRD: >90 ML/MIN/1.73M*2
GLUCOSE SERPL-MCNC: 161 MG/DL (ref 74–99)
HBA1C MFR BLD: 7.1 %
POTASSIUM SERPL-SCNC: 3.9 MMOL/L (ref 3.5–5.3)
SODIUM SERPL-SCNC: 144 MMOL/L (ref 136–145)

## 2023-11-28 PROCEDURE — 99215 OFFICE O/P EST HI 40 MIN: CPT | Performed by: RADIOLOGY

## 2023-11-28 PROCEDURE — 80048 BASIC METABOLIC PNL TOTAL CA: CPT

## 2023-11-28 PROCEDURE — 83036 HEMOGLOBIN GLYCOSYLATED A1C: CPT

## 2023-11-28 PROCEDURE — 77263 THER RADIOLOGY TX PLNG CPLX: CPT | Performed by: RADIOLOGY

## 2023-11-28 PROCEDURE — 36415 COLL VENOUS BLD VENIPUNCTURE: CPT

## 2023-11-28 PROCEDURE — 99205 OFFICE O/P NEW HI 60 MIN: CPT | Performed by: RADIOLOGY

## 2023-11-28 PROCEDURE — 99214 OFFICE O/P EST MOD 30 MIN: CPT | Performed by: RADIOLOGY

## 2023-11-28 ASSESSMENT — PATIENT HEALTH QUESTIONNAIRE - PHQ9
1. LITTLE INTEREST OR PLEASURE IN DOING THINGS: NOT AT ALL
2. FEELING DOWN, DEPRESSED OR HOPELESS: NOT AT ALL
SUM OF ALL RESPONSES TO PHQ9 QUESTIONS 1 AND 2: 0

## 2023-11-28 ASSESSMENT — COLUMBIA-SUICIDE SEVERITY RATING SCALE - C-SSRS
6. HAVE YOU EVER DONE ANYTHING, STARTED TO DO ANYTHING, OR PREPARED TO DO ANYTHING TO END YOUR LIFE?: NO
2. HAVE YOU ACTUALLY HAD ANY THOUGHTS OF KILLING YOURSELF?: NO
1. IN THE PAST MONTH, HAVE YOU WISHED YOU WERE DEAD OR WISHED YOU COULD GO TO SLEEP AND NOT WAKE UP?: NO

## 2023-11-28 ASSESSMENT — ENCOUNTER SYMPTOMS
OCCASIONAL FEELINGS OF UNSTEADINESS: 0
DEPRESSION: 0
LOSS OF SENSATION IN FEET: 0

## 2023-11-28 ASSESSMENT — PAIN SCALES - GENERAL: PAINLEVEL: 3

## 2023-11-28 NOTE — PROGRESS NOTES
Radiation Oncology Outpatient Consult    Patient Name:  Jason Hollins  MRN:  [id number]  :  1960    Referring Provider: Mikal Hawkins MD  Primary Care Provider: RAQUEL Gregory  Care Team: Patient Care Team:  RAQUEL Greogry as PCP - General    Date of Service: 2023       SUMMARY: 63 y.o. 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 male who was referred by Mikal Hawkins MD, for a consultation to the [facility] Department of Radiation Oncology.  He is presenting for evaluation and management of cancer    Oncologic History as follows:      63 y.o. male presenting with large R gluteal mass.  First noticed in .  States that it has been growing since then.  He initially presented to his Doctor told him to present to his PCP.  His PCP ordered local imaging studies including CT scan and MRI which was concerning for potential neoplasm for which she was referred to orthopedic oncology clinic. MRI demonstrated 14.9 cm mass, CT chest w/o evidence of mets. I reviewed the films. He underwent CT guided bx with path pending    Today, the patient reports feeling well overall. The patient reports his only symptom is the gluteal mass with associated pain when standing/ambulating. He does also endorse some numbness and tingling down his right leg.     Prior Radiotherapy:  No  Radiation Treatments       No radiation treatments to show. (Treatments may have been administered in another system.)          Current Systemic Treatment:  No     Presence of Pacemaker or ICD:  No    Past Medical History:    Past Medical History:   Diagnosis Date    Morbid (severe) obesity due to excess calories (CMS/HCC)     Severe obesity (BMI 35.0-35.9 with comorbidity)    Morbid (severe) obesity due to excess calories (CMS/HCC) 2016    Severe obesity (BMI 35.0-39.9) with  comorbidity    Personal history of other endocrine, nutritional and metabolic disease     History of diabetes mellitus    Personal history of other endocrine, nutritional and metabolic disease     History of hyperlipidemia     History of autoimmune disease: No  History of Cancer: No    Past Surgical History:    Past Surgical History:   Procedure Laterality Date    CT GUIDED PERCUTANEOUS BIOPSY MUSCLE  11/13/2023    CT GUIDED PERCUTANEOUS BIOPSY MUSCLE 11/13/2023 [facility] CT    OTHER SURGICAL HISTORY  05/31/2016    History Of Prior Surgery        Family History:  Cancer-related family history includes Cancer in his father.    Social History:    Social History     Tobacco Use    Smoking status: Never    Smokeless tobacco: Never   Substance Use Topics    Alcohol use: Never    Drug use: Never     Where does the patient live: [place]   Can they come to [place] for treatment (Yes/No):  Prefer no   Can they go to [facility] for treatment if indicated (Yes/No): Prefer no   If no [place]/[facility], then where would want to be treated:- [facility] [place]   Smoking history (Current/Former/Never): Never   Occupation: ,      Social work Assessment:  Does the patient require social work referral (Yes/No): No     Allergies:  No Known Allergies     Medications:    Current Outpatient Medications:     aspirin 81 mg EC tablet, Take 1 tablet (81 mg) by mouth once daily. AS DIRECTED., Disp: , Rfl:     atorvastatin (Lipitor) 40 mg tablet, Take 1 tablet (40 mg) by mouth once daily., Disp: 90 tablet, Rfl: 1    blood sugar diagnostic (Blood Glucose Test) strip, once daily. One Drop Test In Vitro Strip; Test, Disp: , Rfl:     dulaglutide (Trulicity) 4.5 mg/0.5 mL pen injector, Inject 4.5 mg under the skin 1 (one) time per week., Disp: 2 mL, Rfl: 2    dulaglutide (TRULICITY) 4.5 mg/0.5 mL pen injector, INJECT ONE PEN (4.5 MG) UNDER THE SKIN ONCE WEEKLY, Disp: 2 mL, Rfl: 2    ergocalciferol (Vitamin D-2) 1.25 MG (30736 UT)  capsule, Take 1 capsule (50,000 Units) by mouth every 14 (fourteen) days. twice monthly on the 15th and the 30 th for vitamin d deficiency, Disp: 6 capsule, Rfl: 1    fish oil concentrate (Omega-3) 120-180 mg capsule, Take 3 capsules (3,000 mg) by mouth., Disp: , Rfl:     gabapentin (Neurontin) 100 mg capsule, Take one capsule and increase by 1 capsule every 4 days until taking 3 capsules, Disp: 90 capsule, Rfl: 1    gabapentin (Neurontin) 300 mg capsule, Use the 100 mg capsule to titrate to 300 mg , then use the 300 mg capsule and take 1 to 2 capsules hs for pain, Disp: 90 capsule, Rfl: 2    ibuprofen 800 mg tablet, Take 1 tablet (800 mg) by mouth 3 times a day as needed for mild pain (1 - 3) (pain)., Disp: 180 tablet, Rfl: 3    metFORMIN (Glucophage) 1,000 mg tablet, TAKE 1 TABLET BY MOUTH EVERY MORNING AND 1.5 TABLETS BY MOUTH EVERY EVENING ,, Disp: 225 tablet, Rfl: 0    multivitamin tablet, Take 1 tablet by mouth once daily., Disp: , Rfl:     OneTouch Ultra Test strip, Test blood sugar once daily, Disp: 100 strip, Rfl: 3    sildenafil (Viagra) 100 mg tablet, Take 1 tablet (100 mg) by mouth once daily as needed (1 hour before needed)., Disp: 10 tablet, Rfl: 2    tiZANidine (Zanaflex) 4 mg capsule, Take 1 capsule (4 mg) by mouth 3 times a day., Disp: 40 capsule, Rfl: 2    ibuprofen 600 mg tablet, Take 1 tablet (600 mg) by mouth., Disp: , Rfl:       Review of Systems:  Review of Systems   Constitutional: Negative.    HENT:  Negative.     Eyes: Negative.    Respiratory: Negative.     Cardiovascular: Negative.    Gastrointestinal: Negative.    Endocrine: Negative.    Genitourinary: Negative.     Musculoskeletal: Negative.    Skin:  Positive for rash (lower abd area and inside of thighs. Using anti-fungal cream OTC, not helping much).   Neurological: Negative.    Hematological: Negative.    Psychiatric/Behavioral: Negative.       The patient's current pain level was assessed.  They report currently having a pain of 3  out of 10.  They feel their pain is under control with the use of pain medications.      Performance Status:  The Karnofsky performance scale today is 80, Normal activity with effort; some signs or symptoms of disease (ECOG equivalent 1).          OBJECTIVE  Physical Exam:  /72 (BP Location: Right arm, Patient Position: Sitting, BP Cuff Size: Large adult)   Pulse 94   Temp 36.3 °C (97.3 °F) (Tympanic)   Resp 18   Wt 123 kg (271 lb)   SpO2 96%   BMI 38.88 kg/m²    Constitutional: Awake, alert and oriented. No acute distress. Appears stated age.  Eyes: Conjunctivae are clear without exudates or hemorrhage. Eyelids are normal in appearance without swelling or lesions.  ENMT: mucous membranes moist, no apparent injury, no lesions seen  Neck: Neck supple, no apparent injury, trachea midline  Resp/Thorax: Patent airways,  good chest expansion. Thorax symmetric  Cardiovascular: The external chest is normal without lifts, heaves, or thrills. PMI is not visible.  GI: Nondistended, soft, non-tender.  /Gyn: Deferred  MSK: No tenderness noted on palpation. No ROM limitations. Large right gluteal mass  Extremities: normal extremities, no cyanosis, erythema or edema.  Neurological: alert and oriented x3. Sensory and motor function appear intact. No gait abnormality observed.  Psych: Appropriate mood and behavior.  Skin: Warm and dry, no new lesions or rashes.        Laboratory Review:    Lab Results   Component Value Date    WBC 8.2 06/01/2022    HGB 15.1 06/01/2022    HCT 46.9 06/01/2022     06/01/2022    CHOL 89 12/30/2022    TRIG 90 12/30/2022    HDL 31.5 (A) 12/30/2022    LDLDIRECT 42 09/01/2023    ALT 39 09/01/2023    AST 22 09/01/2023     09/01/2023    K 4.1 09/01/2023     09/01/2023    CREATININE 0.87 09/01/2023    BUN 17 09/01/2023    CO2 24 09/01/2023    PSA 0.62 06/01/2022    HGBA1C 7.7 (A) 09/01/2023     Par    The pertinent lab results were reviewed and discussed with the  patient.      Pathology Review:  Pending pathology    Imaging:  The pertinent imaging results were reviewed and discussed with the patient.  Notably,     IMPRESSION:  Complex, heterogeneously enhancing, partially necrotic intramuscular  right gluteus una mass. Imaging characteristics are aggressive  with underlying malignant lesion high in the differential. Recommend  orthopedic oncology consultation and image guided tissue sampling for  further evaluation.    IMPRESSION:  1.  No evidence of intrathoracic metastatic disease         ASSESSMENT:  Jason Hollins is a 63 y.o. male with No matching staging information was found for the patient..     COUNSELING AND COORDINATION OF CARE:        The natural history of sarcoma was reviewed with the patient. Prognostic factors including age, tumor location, stage, grade and surgical margins were reviewed.     Standard treatment modalities such as, neoadjuvant radiation with or without chemo therapy verses adjuvant radiation with or without chemotherapy were discussed with regards to outcome and quality of life measures.    The indications, benefits, side effects and acute complications of radiotherapy, which include dermatitis, pain, swelling and wound healing complications. Chronic complications including lymphedema skin changes, increased risk of fracture and failure to control disease have all been discussed in detail with the patient. All questions were answered to the patient´s satisfaction.     PLAN:    Pending path confirmation  The patient would like treatment at [place]  Will schedule the patient for CT [facility] 12/1/23  Will treat 50 Gy in 25 fractions preop    NCCN Guidelines were applicable to guide this patients treatment plan.   Desmond Humphries MD       Future Appointments   Date Time Provider Department [place]   11/29/2023  2:00 PM Chiki Carbajal MD [id number] [facility]   12/15/2023  2:00 PM PHARMACY [facility] RESOURCE [id number] [facility]

## 2023-11-29 ENCOUNTER — APPOINTMENT (OUTPATIENT)
Dept: ORTHOPEDIC SURGERY | Facility: CLINIC | Age: 63
End: 2023-11-29
Payer: COMMERCIAL

## 2023-11-29 ENCOUNTER — LAB (OUTPATIENT)
Dept: LAB | Facility: CLINIC | Age: 63
End: 2023-11-29
Payer: COMMERCIAL

## 2023-11-29 ENCOUNTER — OFFICE VISIT (OUTPATIENT)
Dept: HEMATOLOGY/ONCOLOGY | Facility: CLINIC | Age: 63
End: 2023-11-29
Payer: COMMERCIAL

## 2023-11-29 VITALS
OXYGEN SATURATION: 99 % | DIASTOLIC BLOOD PRESSURE: 66 MMHG | BODY MASS INDEX: 39.1 KG/M2 | WEIGHT: 272.49 LBS | SYSTOLIC BLOOD PRESSURE: 109 MMHG | TEMPERATURE: 98.4 F | HEART RATE: 86 BPM | RESPIRATION RATE: 18 BRPM

## 2023-11-29 DIAGNOSIS — C49.9 SOFT TISSUE SARCOMA (MULTI): Primary | ICD-10-CM

## 2023-11-29 DIAGNOSIS — C49.9 SOFT TISSUE SARCOMA (MULTI): ICD-10-CM

## 2023-11-29 LAB
BASOPHILS # BLD AUTO: 0.05 X10*3/UL (ref 0–0.1)
BASOPHILS NFR BLD AUTO: 0.8 %
EOSINOPHIL # BLD AUTO: 0.08 X10*3/UL (ref 0–0.7)
EOSINOPHIL NFR BLD AUTO: 1.2 %
ERYTHROCYTE [DISTWIDTH] IN BLOOD BY AUTOMATED COUNT: 12.6 % (ref 11.5–14.5)
HCT VFR BLD AUTO: 44.2 % (ref 41–52)
HGB BLD-MCNC: 14.3 G/DL (ref 13.5–17.5)
IMM GRANULOCYTES # BLD AUTO: 0.02 X10*3/UL (ref 0–0.7)
IMM GRANULOCYTES NFR BLD AUTO: 0.3 % (ref 0–0.9)
LYMPHOCYTES # BLD AUTO: 1.53 X10*3/UL (ref 1.2–4.8)
LYMPHOCYTES NFR BLD AUTO: 23.6 %
MCH RBC QN AUTO: 26.9 PG (ref 26–34)
MCHC RBC AUTO-ENTMCNC: 32.4 G/DL (ref 32–36)
MCV RBC AUTO: 83 FL (ref 80–100)
MONOCYTES # BLD AUTO: 0.46 X10*3/UL (ref 0.1–1)
MONOCYTES NFR BLD AUTO: 7.1 %
NEUTROPHILS # BLD AUTO: 4.33 X10*3/UL (ref 1.2–7.7)
NEUTROPHILS NFR BLD AUTO: 67 %
NRBC BLD-RTO: NORMAL /100{WBCS}
PLATELET # BLD AUTO: 204 X10*3/UL (ref 150–450)
RBC # BLD AUTO: 5.32 X10*6/UL (ref 4.5–5.9)
WBC # BLD AUTO: 6.5 X10*3/UL (ref 4.4–11.3)

## 2023-11-29 PROCEDURE — 84443 ASSAY THYROID STIM HORMONE: CPT

## 2023-11-29 PROCEDURE — 99205 OFFICE O/P NEW HI 60 MIN: CPT | Performed by: INTERNAL MEDICINE

## 2023-11-29 PROCEDURE — 86706 HEP B SURFACE ANTIBODY: CPT

## 2023-11-29 PROCEDURE — 82024 ASSAY OF ACTH: CPT

## 2023-11-29 PROCEDURE — 82977 ASSAY OF GGT: CPT

## 2023-11-29 PROCEDURE — 3051F HG A1C>EQUAL 7.0%<8.0%: CPT | Performed by: INTERNAL MEDICINE

## 2023-11-29 PROCEDURE — 36415 COLL VENOUS BLD VENIPUNCTURE: CPT

## 2023-11-29 PROCEDURE — 86803 HEPATITIS C AB TEST: CPT

## 2023-11-29 PROCEDURE — 99215 OFFICE O/P EST HI 40 MIN: CPT | Performed by: INTERNAL MEDICINE

## 2023-11-29 PROCEDURE — 83615 LACTATE (LD) (LDH) ENZYME: CPT

## 2023-11-29 PROCEDURE — 3074F SYST BP LT 130 MM HG: CPT | Performed by: INTERNAL MEDICINE

## 2023-11-29 PROCEDURE — 87389 HIV-1 AG W/HIV-1&-2 AB AG IA: CPT

## 2023-11-29 PROCEDURE — 1036F TOBACCO NON-USER: CPT | Performed by: INTERNAL MEDICINE

## 2023-11-29 PROCEDURE — 86705 HEP B CORE ANTIBODY IGM: CPT

## 2023-11-29 PROCEDURE — 80053 COMPREHEN METABOLIC PANEL: CPT

## 2023-11-29 PROCEDURE — 87340 HEPATITIS B SURFACE AG IA: CPT

## 2023-11-29 PROCEDURE — 3008F BODY MASS INDEX DOCD: CPT | Performed by: INTERNAL MEDICINE

## 2023-11-29 PROCEDURE — 85025 COMPLETE CBC W/AUTO DIFF WBC: CPT

## 2023-11-29 PROCEDURE — 3078F DIAST BP <80 MM HG: CPT | Performed by: INTERNAL MEDICINE

## 2023-11-29 RX ORDER — PAZOPANIB 200 MG/1
800 TABLET, FILM COATED ORAL DAILY
Qty: 112 TABLET | Refills: 3 | Status: SHIPPED | OUTPATIENT
Start: 2023-11-29 | End: 2023-12-05 | Stop reason: SDUPTHER

## 2023-11-29 ASSESSMENT — PAIN SCALES - GENERAL: PAINLEVEL: 4

## 2023-11-30 ENCOUNTER — SPECIALTY PHARMACY (OUTPATIENT)
Dept: PHARMACY | Facility: CLINIC | Age: 63
End: 2023-11-30

## 2023-11-30 LAB
ALBUMIN SERPL BCP-MCNC: 4.5 G/DL (ref 3.4–5)
ALP SERPL-CCNC: 85 U/L (ref 33–136)
ALT SERPL W P-5'-P-CCNC: 43 U/L (ref 10–52)
ANION GAP SERPL CALC-SCNC: 13 MMOL/L (ref 10–20)
AST SERPL W P-5'-P-CCNC: 24 U/L (ref 9–39)
BILIRUB SERPL-MCNC: 0.6 MG/DL (ref 0–1.2)
BUN SERPL-MCNC: 15 MG/DL (ref 6–23)
CALCIUM SERPL-MCNC: 10.1 MG/DL (ref 8.6–10.6)
CHLORIDE SERPL-SCNC: 106 MMOL/L (ref 98–107)
CO2 SERPL-SCNC: 27 MMOL/L (ref 21–32)
CREAT SERPL-MCNC: 0.87 MG/DL (ref 0.5–1.3)
GFR SERPL CREATININE-BSD FRML MDRD: >90 ML/MIN/1.73M*2
GGT SERPL-CCNC: 18 U/L (ref 5–64)
GLUCOSE SERPL-MCNC: 189 MG/DL (ref 74–99)
HBV CORE IGM SER QL: NONREACTIVE
HBV SURFACE AB SER-ACNC: 29.2 MIU/ML
HBV SURFACE AG SERPL QL IA: NONREACTIVE
HCV AB SER QL: NONREACTIVE
HIV 1+2 AB+HIV1 P24 AG SERPL QL IA: NONREACTIVE
LDH SERPL L TO P-CCNC: 124 U/L (ref 84–246)
POTASSIUM SERPL-SCNC: 4.4 MMOL/L (ref 3.5–5.3)
PROT SERPL-MCNC: 6.9 G/DL (ref 6.4–8.2)
SODIUM SERPL-SCNC: 142 MMOL/L (ref 136–145)
TSH SERPL-ACNC: 1.94 MIU/L (ref 0.44–3.98)

## 2023-12-01 ENCOUNTER — NURSE ONLY (OUTPATIENT)
Dept: RADIATION ONCOLOGY | Facility: CLINIC | Age: 63
End: 2023-12-01
Payer: COMMERCIAL

## 2023-12-01 ENCOUNTER — HOSPITAL ENCOUNTER (OUTPATIENT)
Dept: RADIOLOGY | Facility: EXTERNAL LOCATION | Age: 63
Discharge: HOME | End: 2023-12-01

## 2023-12-01 VITALS
RESPIRATION RATE: 18 BRPM | DIASTOLIC BLOOD PRESSURE: 84 MMHG | HEART RATE: 109 BPM | SYSTOLIC BLOOD PRESSURE: 135 MMHG | OXYGEN SATURATION: 96 % | TEMPERATURE: 97.2 F

## 2023-12-01 DIAGNOSIS — C49.21: ICD-10-CM

## 2023-12-01 LAB — ACTH PLAS-MCNC: 27.3 PG/ML (ref 7.2–63.3)

## 2023-12-01 ASSESSMENT — PAIN SCALES - GENERAL: PAINLEVEL: 0-NO PAIN

## 2023-12-01 ASSESSMENT — ENCOUNTER SYMPTOMS
DIZZINESS: 0
DEPRESSION: 0
NAUSEA: 0
SCLERAL ICTERUS: 0
DIFFICULTY URINATING: 0
CONSTIPATION: 0
BACK PAIN: 0
DIARRHEA: 0
DYSURIA: 0
EYE PROBLEMS: 0
MYALGIAS: 0
FLANK PAIN: 0
EXTREMITY WEAKNESS: 0
CHILLS: 0
HEMATURIA: 0
CONFUSION: 0
LEG SWELLING: 0
FREQUENCY: 0
FEVER: 0
APPETITE CHANGE: 0
FATIGUE: 0
VOMITING: 0
SEIZURES: 0
HOT FLASHES: 0
PALPITATIONS: 0
NERVOUS/ANXIOUS: 0
ADENOPATHY: 0
ABDOMINAL PAIN: 0

## 2023-12-01 NOTE — PROGRESS NOTES
1430: Patient arrived to [facility] for CT Simulation with IV contrast.  VS obtained: /84, , Resp 18, POX 96%, temp 36.2 C, 0/10 pain.  Patient with NKDA, has received contrast in the past with no issues. Pmhx of diabetes, on daily metformin and weekly trulicity. Serum Cr 0.93, EGFR >90 on 11/28/23.  Written home going instructions for IV contrast provided and educated on.  Patient instructed to drink 8 ounces of water every hour for the remainder of the day and to stop taking his metformin for 48 hours. Patient understands and has no further questions at this time.     1450: PIV #22 placed Left AC with no issues. Brisk blood return    1500: Patient brought into CT scan room    1510: IV with brisk blood return. 94ml of contrast administered. Omnipaque 350mg/ml, expiration 6/14/2026,             Lot #[id number]     1525:  IV with brisk blood return. PIV removed with catheter intact.  Water provided to the patient. Dc'd from [facility] ambulating and in stable condition.

## 2023-12-01 NOTE — PROGRESS NOTES
".Patient ID: Jason Hollins is a 63 y.o. male.  Referring Physician: Mikal Hawkins MD  [id number] [facility]  Department [place] [address]  Primary Care Provider: RAQUEL Gregory     Chief Complaint   Patient presents with    New Patient Visit        Mr. Jason Hollins is referred by Dr. 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 He met with our team on 11/29/23.     Mr. Hollins first noticed a mass in the right buttock in June 2023. This was eventually investigated by his PCP and he was found to have a mass. MRI on 09/20/23 reported a 14.9 x 12.7 x 8.3 cm predominantly T2 hyperintense, T1 hypointense, heterogeneously enhancing, encapsulated intramuscular solid lesion in the right gluteus una muscle. He was referred to Dr. Mikal Hawkins, whom he met on 10/25/23. CT guided biopsy of the mass was pursued on 11/13/2023. The pathology slides were discussed in the tumor board and a diagnosis of sarcoma was rendered. He was referred to Radiation Oncology and Medical Oncology for planning pre-operative radiation and neoadjuvant pazopanib.      Subjective   Please refer to \"Notes/Cancer History\" above for complete History of present illness.     Mr Jason Hollins is here with family. He is not reporting any pain, but does feel the mass and the discomfort. He denies unintentional weight loss, nausea, vomiting, fevers, pathological night sweats or changes in appetite. No recent changes in bowel or bladder habit.     Review of Systems:   Review of Systems   Constitutional:  Negative for appetite change, chills, fatigue and fever.   Eyes:  Negative for eye problems and icterus.   Cardiovascular:  Negative for chest pain, leg swelling and palpitations.   Gastrointestinal:  Negative for abdominal pain, constipation, diarrhea, nausea and vomiting.   Endocrine: Negative for hot flashes.   Genitourinary:  Negative for difficulty urinating, dysuria, frequency and hematuria.  "   Musculoskeletal:  Negative for back pain, flank pain, gait problem and myalgias.   Skin:  Negative for itching and rash.   Neurological:  Negative for dizziness, extremity weakness, gait problem and seizures.   Hematological:  Negative for adenopathy.   Psychiatric/Behavioral:  Negative for confusion and depression. The patient is not nervous/anxious.          MEDICAL HISTORY  Past Medical History:   Diagnosis Date    Morbid (severe) obesity due to excess calories (CMS/HCC)     Severe obesity (BMI 35.0-35.9 with comorbidity)    Morbid (severe) obesity due to excess calories (CMS/HCC) 05/31/2016    Severe obesity (BMI 35.0-39.9) with comorbidity    Personal history of other endocrine, nutritional and metabolic disease     History of diabetes mellitus    Personal history of other endocrine, nutritional and metabolic disease     History of hyperlipidemia       FAMILY HISTORY  Family History   Problem Relation Name Age of Onset    Coronary artery disease Mother      Diabetes Mother      Hypertension Mother      Cancer Father      Diabetes Other Grandmother        TOBACCO HISTORY  Tobacco Use: Low Risk  (12/1/2023)    Patient History     Smoking Tobacco Use: Never     Smokeless Tobacco Use: Never     Passive Exposure: Not on file       SOCIAL HISTORY  Social Connections: Not on file        Outpatient Medication Profile:  Current Outpatient Medications on File Prior to Visit   Medication Sig Dispense Refill    aspirin 81 mg EC tablet Take 1 tablet (81 mg) by mouth once daily. AS DIRECTED.      atorvastatin (Lipitor) 40 mg tablet Take 1 tablet (40 mg) by mouth once daily. 90 tablet 1    blood sugar diagnostic (Blood Glucose Test) strip once daily. One Drop Test In Vitro Strip; Test      dulaglutide (Trulicity) 4.5 mg/0.5 mL pen injector Inject 4.5 mg under the skin 1 (one) time per week. 2 mL 2    dulaglutide (TRULICITY) 4.5 mg/0.5 mL pen injector INJECT ONE PEN (4.5 MG) UNDER THE SKIN ONCE WEEKLY 2 mL 2     ergocalciferol (Vitamin D-2) 1.25 MG (38714 UT) capsule Take 1 capsule (50,000 Units) by mouth every 14 (fourteen) days. twice monthly on the 15th and the 30 th for vitamin d deficiency 6 capsule 1    fish oil concentrate (Omega-3) 120-180 mg capsule Take 3 capsules (3,000 mg) by mouth.      gabapentin (Neurontin) 100 mg capsule Take one capsule and increase by 1 capsule every 4 days until taking 3 capsules 90 capsule 1    gabapentin (Neurontin) 300 mg capsule Use the 100 mg capsule to titrate to 300 mg , then use the 300 mg capsule and take 1 to 2 capsules hs for pain 90 capsule 2    ibuprofen 800 mg tablet Take 1 tablet (800 mg) by mouth 3 times a day as needed for mild pain (1 - 3) (pain). 180 tablet 3    metFORMIN (Glucophage) 1,000 mg tablet TAKE 1 TABLET BY MOUTH EVERY MORNING AND 1.5 TABLETS BY MOUTH EVERY EVENING , 225 tablet 0    multivitamin tablet Take 1 tablet by mouth once daily.      OneTouch Ultra Test strip Test blood sugar once daily 100 strip 3    sildenafil (Viagra) 100 mg tablet Take 1 tablet (100 mg) by mouth once daily as needed (1 hour before needed). 10 tablet 2    tiZANidine (Zanaflex) 4 mg capsule Take 1 capsule (4 mg) by mouth 3 times a day. 40 capsule 2    [DISCONTINUED] ibuprofen 600 mg tablet Take 1 tablet (600 mg) by mouth.       No current facility-administered medications on file prior to visit.         Performance Status:  Asymptomatic     Vitals and Measurements:   /66   Pulse 86   Temp 36.9 °C (98.4 °F)   Resp 18   Wt 124 kg (272 lb 7.8 oz)   SpO2 99%   BMI 39.10 kg/m²       Physical Exam:   Physical Exam  Constitutional:       General: He is not in acute distress.     Appearance: Normal appearance. He is not ill-appearing.   HENT:      Head: Normocephalic and atraumatic.   Eyes:      Extraocular Movements: Extraocular movements intact.      Conjunctiva/sclera: Conjunctivae normal.      Pupils: Pupils are equal, round, and reactive to light.   Cardiovascular:      Rate  and Rhythm: Normal rate and regular rhythm.      Pulses: Normal pulses.   Pulmonary:      Effort: Pulmonary effort is normal.      Breath sounds: No wheezing, rhonchi or rales.   Abdominal:      General: Abdomen is flat. Bowel sounds are normal.      Palpations: There is no mass.      Tenderness: There is no abdominal tenderness. There is no rebound.   Musculoskeletal:         General: Swelling (large palpable hard immobile mass in the right gluteus, deep in the muscle.) present. No tenderness. Normal range of motion.      Cervical back: Neck supple.      Right lower leg: No edema.      Left lower leg: No edema.   Skin:     General: Skin is warm.   Neurological:      General: No focal deficit present.      Mental Status: He is alert and oriented to person, place, and time.   Psychiatric:         Mood and Affect: Mood normal.         Behavior: Behavior normal.         Thought Content: Thought content normal.         Judgment: Judgment normal.          Lab Results:  I have reviewed these laboratory results:     Lab on 11/29/2023   Component Date Value Ref Range Status    WBC 11/29/2023 6.5  4.4 - 11.3 x10*3/uL Final    nRBC 11/29/2023    Final    RBC 11/29/2023 5.32  4.50 - 5.90 x10*6/uL Final    Hemoglobin 11/29/2023 14.3  13.5 - 17.5 g/dL Final    Hematocrit 11/29/2023 44.2  41.0 - 52.0 % Final    MCV 11/29/2023 83  80 - 100 fL Final    MCH 11/29/2023 26.9  26.0 - 34.0 pg Final    MCHC 11/29/2023 32.4  32.0 - 36.0 g/dL Final    RDW 11/29/2023 12.6  11.5 - 14.5 % Final    Platelets 11/29/2023 204  150 - 450 x10*3/uL Final    Neutrophils % 11/29/2023 67.0  40.0 - 80.0 % Final    Immature Granulocytes %, Automated 11/29/2023 0.3  0.0 - 0.9 % Final    Lymphocytes % 11/29/2023 23.6  13.0 - 44.0 % Final    Monocytes % 11/29/2023 7.1  2.0 - 10.0 % Final    Eosinophils % 11/29/2023 1.2  0.0 - 6.0 % Final    Basophils % 11/29/2023 0.8  0.0 - 2.0 % Final    Neutrophils Absolute 11/29/2023 4.33  1.20 - 7.70 x10*3/uL Final     Immature Granulocytes Absolute, Au* 11/29/2023 0.02  0.00 - 0.70 x10*3/uL Final    Lymphocytes Absolute 11/29/2023 1.53  1.20 - 4.80 x10*3/uL Final    Monocytes Absolute 11/29/2023 0.46  0.10 - 1.00 x10*3/uL Final    Eosinophils Absolute 11/29/2023 0.08  0.00 - 0.70 x10*3/uL Final    Basophils Absolute 11/29/2023 0.05  0.00 - 0.10 x10*3/uL Final    Glucose 11/29/2023 189 (H)  74 - 99 mg/dL Final    Sodium 11/29/2023 142  136 - 145 mmol/L Final    Potassium 11/29/2023 4.4  3.5 - 5.3 mmol/L Final    Chloride 11/29/2023 106  98 - 107 mmol/L Final    Bicarbonate 11/29/2023 27  21 - 32 mmol/L Final    Anion Gap 11/29/2023 13  10 - 20 mmol/L Final    Urea Nitrogen 11/29/2023 15  6 - 23 mg/dL Final    Creatinine 11/29/2023 0.87  0.50 - 1.30 mg/dL Final    eGFR 11/29/2023 >90  >60 mL/min/1.73m*2 Final    Calcium 11/29/2023 10.1  8.6 - 10.6 mg/dL Final    Albumin 11/29/2023 4.5  3.4 - 5.0 g/dL Final    Alkaline Phosphatase 11/29/2023 85  33 - 136 U/L Final    Total Protein 11/29/2023 6.9  6.4 - 8.2 g/dL Final    AST 11/29/2023 24  9 - 39 U/L Final    Bilirubin, Total 11/29/2023 0.6  0.0 - 1.2 mg/dL Final    ALT 11/29/2023 43  10 - 52 U/L Final    GGT 11/29/2023 18  5 - 64 U/L Final    Adrenocorticotropic Hormone (ACTH) 11/29/2023 27.3  7.2 - 63.3 pg/mL Final    LDH 11/29/2023 124  84 - 246 U/L Final    Hepatitis B Core AB; IgM 11/29/2023 Nonreactive  Nonreactive Final    Hepatitis B Surface AG 11/29/2023 Nonreactive  Nonreactive Final    Hepatitis B Surface AB 11/29/2023 29.2 (H)  <10.0 mIU/mL Final    Hepatitis C AB 11/29/2023 Nonreactive  Nonreactive Final    HIV 1/2 Antigen/Antibody Screen wi* 11/29/2023 Nonreactive  Nonreactive Final    Thyroid Stimulating Hormone 11/29/2023 1.94  0.44 - 3.98 mIU/L Final   Lab on 11/28/2023   Component Date Value Ref Range Status    Hemoglobin A1C 11/28/2023 7.1 (H)  see below % Final    Estimated Average Glucose 11/28/2023 157  Not Established mg/dL Final    Glucose 11/28/2023 161 (H)   74 - 99 mg/dL Final    Sodium 11/28/2023 144  136 - 145 mmol/L Final    Potassium 11/28/2023 3.9  3.5 - 5.3 mmol/L Final    Chloride 11/28/2023 108 (H)  98 - 107 mmol/L Final    Bicarbonate 11/28/2023 28  21 - 32 mmol/L Final    Anion Gap 11/28/2023 12  10 - 20 mmol/L Final    Urea Nitrogen 11/28/2023 15  6 - 23 mg/dL Final    Creatinine 11/28/2023 0.93  0.50 - 1.30 mg/dL Final    eGFR 11/28/2023 >90  >60 mL/min/1.73m*2 Final    Calcium 11/28/2023 9.9  8.6 - 10.3 mg/dL Final       Radiology Result:  I have reviewed the latest Imaging in PACS and the findings are noted in this note. I discussed the results of the latest imaging with the patient. All previous imaging were reviewed at the time it was completed. Full records are available in the EMR for review as well.     MR pelvis w and wo IV contrast  MRI of the pelvis with and without IV contrast;  9/29/2023 10:23 am     IMPRESSION:  Complex, heterogeneously enhancing, partially necrotic intramuscular  right gluteus una mass. Imaging characteristics are aggressive  with underlying malignant lesion high in the differential. Recommend  orthopedic oncology consultation and image guided tissue sampling for  further evaluation.    CT chest wo IV contrast (11/17/23)  IMPRESSION:  1.  No evidence of intrathoracic metastatic disease     Pathology Results:  I have reviewed the full pathology report recorded in the EMR. The pertinent portions indicating diagnosis are listed here in the note. for details please refer to the full report recorded in the EMR.    Surgical pathology in process. Discussed in tumor board and the pathologist is confident regarding the sarcoma diagnosis.     Assessment and Plan:   Assessment/Plan      Mr. Jason Hollins is a 63 y.o. male with a diagnosis of localized sarcoma. Please see the evolution of the case listed above in the cancer history.     Today,   We discussed the natural history of soft-tissue sarcoma, risk factors, prognostic factors,  staging, treatment according to various stages, and the kind of treatment involved. We discussed the role of surgery, radiation, and systemic treatment. Although, final pathology report is awaited at the time of this note, the case has been discussed in the tumor board and it seems that the patient has soft-tissue sarcoma    We are proposing combining preoperative RT with pazopanib to increase the chances of achieving a pathologic Complete response. The most recent phase II study- PASART-2 trial (PMID- 32568660) demonstrated a high rate of pathological complete response. This study was reported in [facility] in 2021. The earlier Phase 1 clinical study had already demonstrated safety of the combination (PMID: 78770890)      Acta Oncol. 2021 Dec;60(12):1816-1084. doi: 10.1080/7673245Z.2021.9817861. Epub 2021 Sep 23.  A phase II study on the mackenzie-adjuvant combination of pazopanib and radiotherapy in patients with high-risk, localized soft tissue sarcoma  Travis Luna et al.  PMID: 12801669 DOI: 10.1080/1257713W.2021.2929346    This patient has high risk localized sarcoma, which has a high potential to occur locally with current standard of care. Hence, combining Pazopanib with RT will hopefully increase the chance of pathological Complete response, which in turn translates into reduction of local recurrence rate over time.  Our goal is to give him Pazopanib till he receives RT. He will stop Pazopanib the day he stops RT. If there is no evidence of systemic disease, then we will not give him pazopanib after surgery.    Plan:  - Prescribe Pazopanib 800mg po daily in  Specialty pharmacy.   - Labs today  - RTC x 1 week.       DISCLAIMER:   In preparing for this visit and writing this note, I reviewed all the previous electronic medical records (labs, imaging and medical charts) of the patient available in the physician portal. Significant findings which helped in decision making are recorded  in this chart.      The plan was discussed with the patient. We gave him ample opportunities to ask questions. All questions were answered to his satisfaction and he verbalized understanding.       Chiki Carbajal MD    Hematology and Oncology     Phone: [phone]  Fax: [phone].

## 2023-12-05 RX ORDER — PAZOPANIB 200 MG/1
800 TABLET, FILM COATED ORAL DAILY
Qty: 112 TABLET | Refills: 3 | Status: SHIPPED | OUTPATIENT
Start: 2023-12-05 | End: 2024-01-23 | Stop reason: ALTCHOICE

## 2023-12-06 ENCOUNTER — OFFICE VISIT (OUTPATIENT)
Dept: HEMATOLOGY/ONCOLOGY | Facility: CLINIC | Age: 63
End: 2023-12-06
Payer: COMMERCIAL

## 2023-12-06 ENCOUNTER — TELEPHONE (OUTPATIENT)
Dept: HEMATOLOGY/ONCOLOGY | Facility: CLINIC | Age: 63
End: 2023-12-06
Payer: COMMERCIAL

## 2023-12-06 DIAGNOSIS — C49.9 SOFT TISSUE SARCOMA (MULTI): ICD-10-CM

## 2023-12-06 LAB
LAB AP ASR DISCLAIMER: NORMAL
LABORATORY COMMENT REPORT: NORMAL
PATH REPORT.FINAL DX SPEC: NORMAL
PATH REPORT.GROSS SPEC: NORMAL
PATH REPORT.TOTAL CANCER: NORMAL
PATHOLOGY SYNOPTIC REPORT: NORMAL

## 2023-12-06 NOTE — TELEPHONE ENCOUNTER
I spoke to Jason Hollins at 0900 hrs on 12/06/23.  I updated him regarding the status is Pazopanib which is approved by his insurance.   His insurance company instructed us to transfer the medicine to \[facility]\"" Specialty pharmacy which has been done.   I gave him the name and the phone number of the pharmacy.   He is going to coordinate the delivery of the medicine.   I will have a telehealth visit with him next week.     Reviewed and approved by KIMBERLY ROSS on 12/6/23 at 9:22 AM.

## 2023-12-13 ENCOUNTER — HOSPITAL ENCOUNTER (OUTPATIENT)
Dept: RADIATION ONCOLOGY | Facility: CLINIC | Age: 63
Setting detail: RADIATION/ONCOLOGY SERIES
Discharge: HOME | End: 2023-12-13
Payer: COMMERCIAL

## 2023-12-13 ENCOUNTER — TELEMEDICINE (OUTPATIENT)
Dept: HEMATOLOGY/ONCOLOGY | Facility: CLINIC | Age: 63
End: 2023-12-13
Payer: COMMERCIAL

## 2023-12-13 DIAGNOSIS — C49.9 SOFT TISSUE SARCOMA (MULTI): Primary | ICD-10-CM

## 2023-12-13 DIAGNOSIS — Z51.81 ENCOUNTER FOR MONITORING CARDIOTOXIC DRUG THERAPY: ICD-10-CM

## 2023-12-13 DIAGNOSIS — Z79.899 ENCOUNTER FOR MONITORING CARDIOTOXIC DRUG THERAPY: ICD-10-CM

## 2023-12-13 DIAGNOSIS — R53.0 NEOPLASTIC MALIGNANT RELATED FATIGUE: ICD-10-CM

## 2023-12-13 PROCEDURE — 77338 DESIGN MLC DEVICE FOR IMRT: CPT | Performed by: RADIOLOGY

## 2023-12-13 PROCEDURE — 77301 RADIOTHERAPY DOSE PLAN IMRT: CPT | Performed by: RADIOLOGY

## 2023-12-13 PROCEDURE — 99443 PR PHYS/QHP TELEPHONE EVALUATION 21-30 MIN: CPT | Performed by: INTERNAL MEDICINE

## 2023-12-13 PROCEDURE — 77300 RADIATION THERAPY DOSE PLAN: CPT | Performed by: RADIOLOGY

## 2023-12-13 ASSESSMENT — ENCOUNTER SYMPTOMS
FLANK PAIN: 0
FEVER: 0
FREQUENCY: 0
NAUSEA: 0
CONFUSION: 0
SEIZURES: 0
BACK PAIN: 0
HEMATURIA: 0
NERVOUS/ANXIOUS: 0
MYALGIAS: 0
EYE PROBLEMS: 0
ABDOMINAL PAIN: 0
DEPRESSION: 0
PALPITATIONS: 0
DIFFICULTY URINATING: 0
DIZZINESS: 0
EXTREMITY WEAKNESS: 0
CONSTIPATION: 0
LEG SWELLING: 0
CHILLS: 0
APPETITE CHANGE: 0
SCLERAL ICTERUS: 0
ADENOPATHY: 0
DIARRHEA: 0
DYSURIA: 0
VOMITING: 0
HOT FLASHES: 0
FATIGUE: 0

## 2023-12-14 NOTE — PROGRESS NOTES
".Patient ID: Jason Hollins is a 63 y.o. male.  Referring Physician: No referring provider defined for this encounter.  Primary Care Provider: RAQUEL Gregory     Chief Complaint   Patient presents with    Follow-up    Sarcoma     Telephone visit.      Mr. Jason Hollins is referred by Dr. 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 He met with our team on 11/29/23.     Mr. Hollins first noticed a mass in the right buttock in June 2023. This was eventually investigated by his PCP and he was found to have a mass. MRI on 09/20/23 reported a 14.9 x 12.7 x 8.3 cm predominantly T2 hyperintense, T1 hypointense, heterogeneously enhancing, encapsulated intramuscular solid lesion in the right gluteus una muscle. He was referred to Dr. Mikal Hawkins, whom he met on 10/25/23. CT guided biopsy of the mass was pursued on 11/13/2023. The pathology slides were discussed in the tumor board and a diagnosis of sarcoma was rendered. He was referred to Radiation Oncology and Medical Oncology for planning pre-operative radiation and neoadjuvant pazopanib.    He met us on 11/29/23 and we prescribed the pazopanib. He received this medicine on 12/11/23 and he will start at 400mg on 12/14/23.      Subjective   Please refer to \"Notes/Cancer History\" above for complete History of present illness.     Mr Jason Hollins feels well. He is awaiting a call from Rad Onc regarding the RT. He also received his Pazopanib.     The visit was changed to phone visit/virtual visit in agreement with the patient. I am always available to meet with the patient in person should they wish to meet me.     Review of Systems:   Review of Systems   Constitutional:  Negative for appetite change, chills, fatigue and fever.   Eyes:  Negative for eye problems and icterus.   Cardiovascular:  Negative for chest pain, leg swelling and palpitations.   Gastrointestinal:  Negative for abdominal pain, constipation, diarrhea, nausea and " vomiting.   Endocrine: Negative for hot flashes.   Genitourinary:  Negative for difficulty urinating, dysuria, frequency and hematuria.    Musculoskeletal:  Negative for back pain, flank pain, gait problem and myalgias.   Skin:  Negative for itching and rash.   Neurological:  Negative for dizziness, extremity weakness, gait problem and seizures.   Hematological:  Negative for adenopathy.   Psychiatric/Behavioral:  Negative for confusion and depression. The patient is not nervous/anxious.          MEDICAL HISTORY  Past Medical History:   Diagnosis Date    Morbid (severe) obesity due to excess calories (CMS/HCC)     Severe obesity (BMI 35.0-35.9 with comorbidity)    Morbid (severe) obesity due to excess calories (CMS/HCC) 05/31/2016    Severe obesity (BMI 35.0-39.9) with comorbidity    Personal history of other endocrine, nutritional and metabolic disease     History of diabetes mellitus    Personal history of other endocrine, nutritional and metabolic disease     History of hyperlipidemia       FAMILY HISTORY  Family History   Problem Relation Name Age of Onset    Coronary artery disease Mother      Diabetes Mother      Hypertension Mother      Cancer Father      Diabetes Other Grandmother        TOBACCO HISTORY  Tobacco Use: Low Risk  (12/1/2023)    Patient History     Smoking Tobacco Use: Never     Smokeless Tobacco Use: Never     Passive Exposure: Not on file       SOCIAL HISTORY  Social Connections: Not on file        Outpatient Medication Profile:  Current Outpatient Medications on File Prior to Visit   Medication Sig Dispense Refill    aspirin 81 mg EC tablet Take 1 tablet (81 mg) by mouth once daily. AS DIRECTED.      atorvastatin (Lipitor) 40 mg tablet Take 1 tablet (40 mg) by mouth once daily. 90 tablet 1    blood sugar diagnostic (Blood Glucose Test) strip once daily. One Drop Test In Vitro Strip; Test      dulaglutide (Trulicity) 4.5 mg/0.5 mL pen injector Inject 4.5 mg under the skin 1 (one) time per  week. 2 mL 2    dulaglutide (TRULICITY) 4.5 mg/0.5 mL pen injector INJECT ONE PEN (4.5 MG) UNDER THE SKIN ONCE WEEKLY 2 mL 2    ergocalciferol (Vitamin D-2) 1.25 MG (03878 UT) capsule Take 1 capsule (50,000 Units) by mouth every 14 (fourteen) days. twice monthly on the 15th and the 30 th for vitamin d deficiency 6 capsule 1    fish oil concentrate (Omega-3) 120-180 mg capsule Take 3 capsules (3,000 mg) by mouth.      gabapentin (Neurontin) 100 mg capsule Take one capsule and increase by 1 capsule every 4 days until taking 3 capsules 90 capsule 1    gabapentin (Neurontin) 300 mg capsule Use the 100 mg capsule to titrate to 300 mg , then use the 300 mg capsule and take 1 to 2 capsules hs for pain 90 capsule 2    ibuprofen 800 mg tablet Take 1 tablet (800 mg) by mouth 3 times a day as needed for mild pain (1 - 3) (pain). 180 tablet 3    metFORMIN (Glucophage) 1,000 mg tablet TAKE 1 TABLET BY MOUTH EVERY MORNING AND 1.5 TABLETS BY MOUTH EVERY EVENING , 225 tablet 0    multivitamin tablet Take 1 tablet by mouth once daily.      OneTouch Ultra Test strip Test blood sugar once daily 100 strip 3    PAZOPanib (Votrient) 200 mg tablet Take 4 tablets (800 mg total) by mouth once daily.  Take on an empty stomach, at least 1 hour before or 2 hours after a meal. 112 tablet 3    sildenafil (Viagra) 100 mg tablet Take 1 tablet (100 mg) by mouth once daily as needed (1 hour before needed). 10 tablet 2    tiZANidine (Zanaflex) 4 mg capsule Take 1 capsule (4 mg) by mouth 3 times a day. 40 capsule 2     No current facility-administered medications on file prior to visit.         Performance Status:  Asymptomatic     Vitals and Measurements:   There were no vitals taken for this visit.      Physical Exam:   Physical Exam     Lab Results:  I have reviewed these laboratory results:     Lab on 11/29/2023   Component Date Value Ref Range Status    WBC 11/29/2023 6.5  4.4 - 11.3 x10*3/uL Final    nRBC 11/29/2023    Final    RBC 11/29/2023  5.32  4.50 - 5.90 x10*6/uL Final    Hemoglobin 11/29/2023 14.3  13.5 - 17.5 g/dL Final    Hematocrit 11/29/2023 44.2  41.0 - 52.0 % Final    MCV 11/29/2023 83  80 - 100 fL Final    MCH 11/29/2023 26.9  26.0 - 34.0 pg Final    MCHC 11/29/2023 32.4  32.0 - 36.0 g/dL Final    RDW 11/29/2023 12.6  11.5 - 14.5 % Final    Platelets 11/29/2023 204  150 - 450 x10*3/uL Final    Neutrophils % 11/29/2023 67.0  40.0 - 80.0 % Final    Immature Granulocytes %, Automated 11/29/2023 0.3  0.0 - 0.9 % Final    Lymphocytes % 11/29/2023 23.6  13.0 - 44.0 % Final    Monocytes % 11/29/2023 7.1  2.0 - 10.0 % Final    Eosinophils % 11/29/2023 1.2  0.0 - 6.0 % Final    Basophils % 11/29/2023 0.8  0.0 - 2.0 % Final    Neutrophils Absolute 11/29/2023 4.33  1.20 - 7.70 x10*3/uL Final    Immature Granulocytes Absolute, Au* 11/29/2023 0.02  0.00 - 0.70 x10*3/uL Final    Lymphocytes Absolute 11/29/2023 1.53  1.20 - 4.80 x10*3/uL Final    Monocytes Absolute 11/29/2023 0.46  0.10 - 1.00 x10*3/uL Final    Eosinophils Absolute 11/29/2023 0.08  0.00 - 0.70 x10*3/uL Final    Basophils Absolute 11/29/2023 0.05  0.00 - 0.10 x10*3/uL Final    Glucose 11/29/2023 189 (H)  74 - 99 mg/dL Final    Sodium 11/29/2023 142  136 - 145 mmol/L Final    Potassium 11/29/2023 4.4  3.5 - 5.3 mmol/L Final    Chloride 11/29/2023 106  98 - 107 mmol/L Final    Bicarbonate 11/29/2023 27  21 - 32 mmol/L Final    Anion Gap 11/29/2023 13  10 - 20 mmol/L Final    Urea Nitrogen 11/29/2023 15  6 - 23 mg/dL Final    Creatinine 11/29/2023 0.87  0.50 - 1.30 mg/dL Final    eGFR 11/29/2023 >90  >60 mL/min/1.73m*2 Final    Calcium 11/29/2023 10.1  8.6 - 10.6 mg/dL Final    Albumin 11/29/2023 4.5  3.4 - 5.0 g/dL Final    Alkaline Phosphatase 11/29/2023 85  33 - 136 U/L Final    Total Protein 11/29/2023 6.9  6.4 - 8.2 g/dL Final    AST 11/29/2023 24  9 - 39 U/L Final    Bilirubin, Total 11/29/2023 0.6  0.0 - 1.2 mg/dL Final    ALT 11/29/2023 43  10 - 52 U/L Final    GGT 11/29/2023 18  5 -  64 U/L Final    Adrenocorticotropic Hormone (ACTH) 11/29/2023 27.3  7.2 - 63.3 pg/mL Final    LDH 11/29/2023 124  84 - 246 U/L Final    Hepatitis B Core AB; IgM 11/29/2023 Nonreactive  Nonreactive Final    Hepatitis B Surface AG 11/29/2023 Nonreactive  Nonreactive Final    Hepatitis B Surface AB 11/29/2023 29.2 (H)  <10.0 mIU/mL Final    Hepatitis C AB 11/29/2023 Nonreactive  Nonreactive Final    HIV 1/2 Antigen/Antibody Screen wi* 11/29/2023 Nonreactive  Nonreactive Final    Thyroid Stimulating Hormone 11/29/2023 1.94  0.44 - 3.98 mIU/L Final   Lab on 11/28/2023   Component Date Value Ref Range Status    Hemoglobin A1C 11/28/2023 7.1 (H)  see below % Final    Estimated Average Glucose 11/28/2023 157  Not Established mg/dL Final    Glucose 11/28/2023 161 (H)  74 - 99 mg/dL Final    Sodium 11/28/2023 144  136 - 145 mmol/L Final    Potassium 11/28/2023 3.9  3.5 - 5.3 mmol/L Final    Chloride 11/28/2023 108 (H)  98 - 107 mmol/L Final    Bicarbonate 11/28/2023 28  21 - 32 mmol/L Final    Anion Gap 11/28/2023 12  10 - 20 mmol/L Final    Urea Nitrogen 11/28/2023 15  6 - 23 mg/dL Final    Creatinine 11/28/2023 0.93  0.50 - 1.30 mg/dL Final    eGFR 11/28/2023 >90  >60 mL/min/1.73m*2 Final    Calcium 11/28/2023 9.9  8.6 - 10.3 mg/dL Final       Radiology Result:  I have reviewed the latest Imaging in PACS and the findings are noted in this note. I discussed the results of the latest imaging with the patient. All previous imaging were reviewed at the time it was completed. Full records are available in the EMR for review as well.     MR pelvis w and wo IV contrast  MRI of the pelvis with and without IV contrast;  9/29/2023 10:23 am     IMPRESSION:  Complex, heterogeneously enhancing, partially necrotic intramuscular  right gluteus una mass. Imaging characteristics are aggressive  with underlying malignant lesion high in the differential. Recommend  orthopedic oncology consultation and image guided tissue sampling  for  further evaluation.    CT chest wo IV contrast (11/17/23)  IMPRESSION:  1.  No evidence of intrathoracic metastatic disease     Pathology Results:  I have reviewed the full pathology report recorded in the EMR. The pertinent portions indicating diagnosis are listed here in the note. for details please refer to the full report recorded in the EMR.    Surgical pathology in process. Discussed in tumor board and the pathologist is confident regarding the sarcoma diagnosis.     Assessment and Plan:   Assessment/Plan      Mr. Jason Hollins is a 63 y.o. male with a diagnosis of localized sarcoma. Please see the evolution of the case listed above in the cancer history.     Today,   Mr. Hollins awaits the date for RT. He will start Pazopanib on 12/14/23 at 400mg. We will talk to him on 12/19/23. He will get blood test on 12/18/23. If he is tolerating the medicine OK, then we will increase the dose to 800mg PO daily.     Plan:  - Pazopanib start at 400mg po daily on 12/14/23.  - Blood test on 12/18/23.   - RTC as phone visit on 12/19/23.     DISCLAIMER:   In preparing for this visit and writing this note, I reviewed all the previous electronic medical records (labs, imaging and medical charts) of the patient available in the physician portal. Significant findings which helped in decision making are recorded  in this chart.     The plan was discussed with the patient. We gave him ample opportunities to ask questions. All questions were answered to his satisfaction and he verbalized understanding.       Chiki Carbajal MD    Hematology and Oncology     Phone: [phone]  Fax: [phone].

## 2023-12-18 ENCOUNTER — LAB (OUTPATIENT)
Dept: LAB | Facility: CLINIC | Age: 63
End: 2023-12-18
Payer: COMMERCIAL

## 2023-12-18 ENCOUNTER — HOSPITAL ENCOUNTER (OUTPATIENT)
Dept: RADIATION ONCOLOGY | Facility: CLINIC | Age: 63
Setting detail: RADIATION/ONCOLOGY SERIES
Discharge: HOME | End: 2023-12-18
Payer: COMMERCIAL

## 2023-12-18 DIAGNOSIS — Z51.81 ENCOUNTER FOR MONITORING CARDIOTOXIC DRUG THERAPY: ICD-10-CM

## 2023-12-18 DIAGNOSIS — R53.0 NEOPLASTIC MALIGNANT RELATED FATIGUE: ICD-10-CM

## 2023-12-18 DIAGNOSIS — C49.9 SOFT TISSUE SARCOMA (MULTI): ICD-10-CM

## 2023-12-18 DIAGNOSIS — Z79.899 ENCOUNTER FOR MONITORING CARDIOTOXIC DRUG THERAPY: ICD-10-CM

## 2023-12-18 DIAGNOSIS — C49.21 MALIGNANT NEOPLASM OF CONNECTIVE AND SOFT TISSUE OF RIGHT LOWER LIMB, INCLUDING HIP (MULTI): ICD-10-CM

## 2023-12-18 LAB
ALBUMIN SERPL BCP-MCNC: 4.6 G/DL (ref 3.4–5)
ALP SERPL-CCNC: 80 U/L (ref 33–136)
ALT SERPL W P-5'-P-CCNC: 32 U/L (ref 10–52)
ANION GAP SERPL CALC-SCNC: 16 MMOL/L (ref 10–20)
AST SERPL W P-5'-P-CCNC: 24 U/L (ref 9–39)
BASOPHILS # BLD AUTO: 0.04 X10*3/UL (ref 0–0.1)
BASOPHILS NFR BLD AUTO: 0.5 %
BILIRUB SERPL-MCNC: 1 MG/DL (ref 0–1.2)
BUN SERPL-MCNC: 13 MG/DL (ref 6–23)
CALCIUM SERPL-MCNC: 9.7 MG/DL (ref 8.6–10.3)
CHLORIDE SERPL-SCNC: 106 MMOL/L (ref 98–107)
CO2 SERPL-SCNC: 24 MMOL/L (ref 21–32)
CREAT SERPL-MCNC: 0.92 MG/DL (ref 0.5–1.3)
EOSINOPHIL # BLD AUTO: 0.02 X10*3/UL (ref 0–0.7)
EOSINOPHIL NFR BLD AUTO: 0.3 %
ERYTHROCYTE [DISTWIDTH] IN BLOOD BY AUTOMATED COUNT: 11.9 % (ref 11.5–14.5)
GFR SERPL CREATININE-BSD FRML MDRD: >90 ML/MIN/1.73M*2
GLUCOSE SERPL-MCNC: 126 MG/DL (ref 74–99)
HCT VFR BLD AUTO: 46.9 % (ref 41–52)
HGB BLD-MCNC: 15.8 G/DL (ref 13.5–17.5)
IMM GRANULOCYTES # BLD AUTO: 0.02 X10*3/UL (ref 0–0.7)
IMM GRANULOCYTES NFR BLD AUTO: 0.3 % (ref 0–0.9)
LDH SERPL L TO P-CCNC: 136 U/L (ref 84–246)
LYMPHOCYTES # BLD AUTO: 2.06 X10*3/UL (ref 1.2–4.8)
LYMPHOCYTES NFR BLD AUTO: 28.2 %
MCH RBC QN AUTO: 26.6 PG (ref 26–34)
MCHC RBC AUTO-ENTMCNC: 33.7 G/DL (ref 32–36)
MCV RBC AUTO: 79 FL (ref 80–100)
MONOCYTES # BLD AUTO: 0.48 X10*3/UL (ref 0.1–1)
MONOCYTES NFR BLD AUTO: 6.6 %
NEUTROPHILS # BLD AUTO: 4.68 X10*3/UL (ref 1.2–7.7)
NEUTROPHILS NFR BLD AUTO: 64.1 %
NRBC BLD-RTO: 0 /100 WBCS (ref 0–0)
PLATELET # BLD AUTO: 229 X10*3/UL (ref 150–450)
POTASSIUM SERPL-SCNC: 4 MMOL/L (ref 3.5–5.3)
PROT SERPL-MCNC: 7.6 G/DL (ref 6.4–8.2)
RAD ONC MSQ ACTUAL FRACTIONS DELIVERED: 1
RAD ONC MSQ ACTUAL SESSION DELIVERED DOSE: 200 CGRAY
RAD ONC MSQ ACTUAL TOTAL DOSE: 200 CGRAY
RAD ONC MSQ ELAPSED DAYS: 0
RAD ONC MSQ LAST DATE: NORMAL
RAD ONC MSQ PRESCRIBED FRACTIONAL DOSE: 200 CGRAY
RAD ONC MSQ PRESCRIBED NUMBER OF FRACTIONS: 25
RAD ONC MSQ PRESCRIBED TECHNIQUE: NORMAL
RAD ONC MSQ PRESCRIBED TOTAL DOSE: 5000 CGRAY
RAD ONC MSQ START DATE: NORMAL
RAD ONC MSQ TREATMENT COURSE NUMBER: 1
RAD ONC MSQ TREATMENT SITE: NORMAL
RBC # BLD AUTO: 5.94 X10*6/UL (ref 4.5–5.9)
SODIUM SERPL-SCNC: 142 MMOL/L (ref 136–145)
TSH SERPL-ACNC: 2.85 MIU/L (ref 0.44–3.98)
WBC # BLD AUTO: 7.3 X10*3/UL (ref 4.4–11.3)

## 2023-12-18 PROCEDURE — 77014 CHG CT GUIDANCE RADIATION THERAPY FLDS PLACEMENT: CPT | Performed by: STUDENT IN AN ORGANIZED HEALTH CARE EDUCATION/TRAINING PROGRAM

## 2023-12-18 PROCEDURE — 77386 HC INTENSITY-MODULATED RADIATION THERAPY (IMRT), COMPLEX: CPT | Performed by: STUDENT IN AN ORGANIZED HEALTH CARE EDUCATION/TRAINING PROGRAM

## 2023-12-18 PROCEDURE — 81003 URINALYSIS AUTO W/O SCOPE: CPT

## 2023-12-18 PROCEDURE — 80053 COMPREHEN METABOLIC PANEL: CPT

## 2023-12-18 PROCEDURE — 83615 LACTATE (LD) (LDH) ENZYME: CPT

## 2023-12-18 PROCEDURE — 36415 COLL VENOUS BLD VENIPUNCTURE: CPT

## 2023-12-18 PROCEDURE — 84443 ASSAY THYROID STIM HORMONE: CPT

## 2023-12-18 PROCEDURE — 85025 COMPLETE CBC W/AUTO DIFF WBC: CPT

## 2023-12-18 ASSESSMENT — ENCOUNTER SYMPTOMS
SEIZURES: 0
EYE PROBLEMS: 0
VOMITING: 0
DYSURIA: 0
ABDOMINAL PAIN: 0
DIARRHEA: 0
APPETITE CHANGE: 0
FLANK PAIN: 0
NAUSEA: 0
HOT FLASHES: 0
MYALGIAS: 0
DIFFICULTY URINATING: 0
FREQUENCY: 0
BACK PAIN: 0
CONFUSION: 0
DEPRESSION: 0
CONSTIPATION: 0
HEMATURIA: 0
FATIGUE: 0
ADENOPATHY: 0
PALPITATIONS: 0
FEVER: 0
CHILLS: 0
LEG SWELLING: 0
NERVOUS/ANXIOUS: 0
EXTREMITY WEAKNESS: 0
DIZZINESS: 0
SCLERAL ICTERUS: 0

## 2023-12-18 NOTE — PROGRESS NOTES
".Patient ID: Jason Hollins is a 63 y.o. male.  Referring Physician: Chiki Carbajal MD  [id number] [place] [address]  Primary Care Provider: RAQUEL Gregory     Chief Complaint   Patient presents with    Follow-up    Sarcoma     Pazopanib follow up     Mr. Jason Hollins is referred by Dr. 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 He met with our team on 11/29/23.     Mr. Hollins first noticed a mass in the right buttock in June 2023. This was eventually investigated by his PCP and he was found to have a mass. MRI on 09/20/23 reported a 14.9 x 12.7 x 8.3 cm predominantly T2 hyperintense, T1 hypointense, heterogeneously enhancing, encapsulated intramuscular solid lesion in the right gluteus una muscle. He was referred to Dr. Mikal Hawkins, whom he met on 10/25/23. CT guided biopsy of the mass was pursued on 11/13/2023. The pathology slides were discussed in the tumor board and a diagnosis of sarcoma was rendered. He was referred to Radiation Oncology and Medical Oncology for planning pre-operative radiation and neoadjuvant pazopanib.    He met us on 11/29/23 and we prescribed the pazopanib. He received this medicine on 12/11/23 and he started pazopanib at 400mg on 12/14/23. His labs were within normal limits on 12/18/23 and we increased the dose to 800mg on 12/20/23. We also added nifedipine ER 30mg on 12/19/23 for Grade 1 HTN (BP highest read- 150/91 mmHg)    Systemic Treatment:  A Pazopanib concurrent with Radiation.   Started on 12/14/23 at 400mg PO daily. Increased to 800mg PO on 12/20/23.  Radiation started on 12/18/23      Subjective   Please refer to \"Notes/Cancer History\" above for complete History of present illness.     Mr Jason Hollins feels well. His blood pressure has been occasionally high. Other than this he has felt OK with the medicine.     The visit was changed to phone visit/virtual visit in agreement with the patient. I am always available to " meet with the patient in person should they wish to meet me.     Review of Systems:   Review of Systems   Constitutional:  Negative for appetite change, chills, fatigue and fever.   Eyes:  Negative for eye problems and icterus.   Cardiovascular:  Negative for chest pain, leg swelling and palpitations.   Gastrointestinal:  Negative for abdominal pain, constipation, diarrhea, nausea and vomiting.   Endocrine: Negative for hot flashes.   Genitourinary:  Negative for difficulty urinating, dysuria, frequency and hematuria.    Musculoskeletal:  Negative for back pain, flank pain, gait problem and myalgias.   Skin:  Negative for itching and rash.   Neurological:  Negative for dizziness, extremity weakness, gait problem and seizures.   Hematological:  Negative for adenopathy.   Psychiatric/Behavioral:  Negative for confusion and depression. The patient is not nervous/anxious.          MEDICAL HISTORY  Past Medical History:   Diagnosis Date    Morbid (severe) obesity due to excess calories (CMS/[facility])     Severe obesity (BMI 35.0-35.9 with comorbidity)    Morbid (severe) obesity due to excess calories (CMS/[facility]) 05/31/2016    Severe obesity (BMI 35.0-39.9) with comorbidity    Personal history of other endocrine, nutritional and metabolic disease     History of diabetes mellitus    Personal history of other endocrine, nutritional and metabolic disease     History of hyperlipidemia       FAMILY HISTORY  Family History   Problem Relation Name Age of Onset    Coronary artery disease Mother      Diabetes Mother      Hypertension Mother      Cancer Father      Diabetes Other Grandmother        TOBACCO HISTORY  Tobacco Use: Low Risk  (12/1/2023)    Patient History     Smoking Tobacco Use: Never     Smokeless Tobacco Use: Never     Passive Exposure: Not on file       SOCIAL HISTORY  Social Connections: Not on file        Outpatient Medication Profile:  Current Outpatient Medications on File Prior to Visit   Medication Sig Dispense  Refill    aspirin 81 mg EC tablet Take 1 tablet (81 mg) by mouth once daily. AS DIRECTED.      atorvastatin (Lipitor) 40 mg tablet Take 1 tablet (40 mg) by mouth once daily. 90 tablet 1    blood sugar diagnostic (Blood Glucose Test) strip once daily. One Drop Test In Vitro Strip; Test      dulaglutide (Trulicity) 4.5 mg/0.5 mL pen injector Inject 4.5 mg under the skin 1 (one) time per week. 2 mL 2    dulaglutide (TRULICITY) 4.5 mg/0.5 mL pen injector INJECT ONE PEN (4.5 MG) UNDER THE SKIN ONCE WEEKLY 2 mL 2    ergocalciferol (Vitamin D-2) 1.25 MG (36562 UT) capsule Take 1 capsule (50,000 Units) by mouth every 14 (fourteen) days. twice monthly on the 15th and the 30 th for vitamin d deficiency 6 capsule 1    fish oil concentrate (Omega-3) 120-180 mg capsule Take 3 capsules (3,000 mg) by mouth.      gabapentin (Neurontin) 100 mg capsule Take one capsule and increase by 1 capsule every 4 days until taking 3 capsules 90 capsule 1    gabapentin (Neurontin) 300 mg capsule Use the 100 mg capsule to titrate to 300 mg , then use the 300 mg capsule and take 1 to 2 capsules hs for pain 90 capsule 2    ibuprofen 800 mg tablet Take 1 tablet (800 mg) by mouth 3 times a day as needed for mild pain (1 - 3) (pain). 180 tablet 3    metFORMIN (Glucophage) 1,000 mg tablet TAKE 1 TABLET BY MOUTH EVERY MORNING AND 1.5 TABLETS BY MOUTH EVERY EVENING , 225 tablet 0    multivitamin tablet Take 1 tablet by mouth once daily.      OneTouch Ultra Test strip Test blood sugar once daily 100 strip 3    PAZOPanib (Votrient) 200 mg tablet Take 4 tablets (800 mg total) by mouth once daily.  Take on an empty stomach, at least 1 hour before or 2 hours after a meal. 112 tablet 3    sildenafil (Viagra) 100 mg tablet Take 1 tablet (100 mg) by mouth once daily as needed (1 hour before needed). 10 tablet 2    tiZANidine (Zanaflex) 4 mg capsule Take 1 capsule (4 mg) by mouth 3 times a day. 40 capsule 2     No current facility-administered medications on file  prior to visit.         Performance Status:  Asymptomatic     Vitals and Measurements:   There were no vitals taken for this visit.      Physical Exam:   Physical Exam     Lab Results:  I have reviewed these laboratory results:     Hospital Outpatient Visit on 12/19/2023   Component Date Value Ref Range Status    Treatment Site 12/19/2023 Hip_R   Final    Course Number 12/19/2023 1   Final    Prescribed Fractional Dose 12/19/2023 200  cGray Final    Prescribed Total Dose 12/19/2023 5,000  cGray Final    Actual Fractions Delivered 12/19/2023 2   Final    Actual Session Delivered Dose 12/19/2023 200  cGray Final    Actual Total Dose 12/19/2023 400  cGray Final    Prescribed Technique 12/19/2023 VMAT   Final    Elapsed Days 12/19/2023 1   Final    Start Date 12/19/2023 12/18/2023   Final    Last Date 12/19/2023 12/19/2023   Final    Prescribed Number of Fractions 12/19/2023 25   Final   Hospital Outpatient Visit on 12/18/2023   Component Date Value Ref Range Status    Treatment Site 12/18/2023 Hip_R   Final    Course Number 12/18/2023 1   Final    Prescribed Fractional Dose 12/18/2023 200  cGray Final    Prescribed Total Dose 12/18/2023 5,000  cGray Final    Actual Fractions Delivered 12/18/2023 1   Final    Actual Session Delivered Dose 12/18/2023 200  cGray Final    Actual Total Dose 12/18/2023 200  cGray Final    Prescribed Technique 12/18/2023 VMAT   Final    Elapsed Days 12/18/2023 0   Final    Start Date 12/18/2023 12/18/2023   Final    Last Date 12/18/2023 12/18/2023   Final    Prescribed Number of Fractions 12/18/2023 25   Final   Lab on 12/18/2023   Component Date Value Ref Range Status    Thyroid Stimulating Hormone 12/18/2023 2.85  0.44 - 3.98 mIU/L Final    WBC 12/18/2023 7.3  4.4 - 11.3 x10*3/uL Final    nRBC 12/18/2023 0.0  0.0 - 0.0 /100 WBCs Final    RBC 12/18/2023 5.94 (H)  4.50 - 5.90 x10*6/uL Final    Hemoglobin 12/18/2023 15.8  13.5 - 17.5 g/dL Final    Hematocrit 12/18/2023 46.9  41.0 - 52.0 %  Final    MCV 12/18/2023 79 (L)  80 - 100 fL Final    MCH 12/18/2023 26.6  26.0 - 34.0 pg Final    MCHC 12/18/2023 33.7  32.0 - 36.0 g/dL Final    RDW 12/18/2023 11.9  11.5 - 14.5 % Final    Platelets 12/18/2023 229  150 - 450 x10*3/uL Final    Neutrophils % 12/18/2023 64.1  40.0 - 80.0 % Final    Immature Granulocytes %, Automated 12/18/2023 0.3  0.0 - 0.9 % Final    Lymphocytes % 12/18/2023 28.2  13.0 - 44.0 % Final    Monocytes % 12/18/2023 6.6  2.0 - 10.0 % Final    Eosinophils % 12/18/2023 0.3  0.0 - 6.0 % Final    Basophils % 12/18/2023 0.5  0.0 - 2.0 % Final    Neutrophils Absolute 12/18/2023 4.68  1.20 - 7.70 x10*3/uL Final    Immature Granulocytes Absolute, Au* 12/18/2023 0.02  0.00 - 0.70 x10*3/uL Final    Lymphocytes Absolute 12/18/2023 2.06  1.20 - 4.80 x10*3/uL Final    Monocytes Absolute 12/18/2023 0.48  0.10 - 1.00 x10*3/uL Final    Eosinophils Absolute 12/18/2023 0.02  0.00 - 0.70 x10*3/uL Final    Basophils Absolute 12/18/2023 0.04  0.00 - 0.10 x10*3/uL Final    Glucose 12/18/2023 126 (H)  74 - 99 mg/dL Final    Sodium 12/18/2023 142  136 - 145 mmol/L Final    Potassium 12/18/2023 4.0  3.5 - 5.3 mmol/L Final    Chloride 12/18/2023 106  98 - 107 mmol/L Final    Bicarbonate 12/18/2023 24  21 - 32 mmol/L Final    Anion Gap 12/18/2023 16  10 - 20 mmol/L Final    Urea Nitrogen 12/18/2023 13  6 - 23 mg/dL Final    Creatinine 12/18/2023 0.92  0.50 - 1.30 mg/dL Final    eGFR 12/18/2023 >90  >60 mL/min/1.73m*2 Final    Calcium 12/18/2023 9.7  8.6 - 10.3 mg/dL Final    Albumin 12/18/2023 4.6  3.4 - 5.0 g/dL Final    Alkaline Phosphatase 12/18/2023 80  33 - 136 U/L Final    Total Protein 12/18/2023 7.6  6.4 - 8.2 g/dL Final    AST 12/18/2023 24  9 - 39 U/L Final    Bilirubin, Total 12/18/2023 1.0  0.0 - 1.2 mg/dL Final    ALT 12/18/2023 32  10 - 52 U/L Final    LDH 12/18/2023 136  84 - 246 U/L Final    Color, Urine 12/18/2023 Yellow  Straw, Yellow Final    Appearance, Urine 12/18/2023 Clear  Clear Final     Specific Gravity, Urine 12/18/2023 1.024  1.005 - 1.035 Final    pH, Urine 12/18/2023 5.0  5.0, 5.5, 6.0, 6.5, 7.0, 7.5, 8.0 Final    Protein, Urine 12/18/2023 NEGATIVE  NEGATIVE mg/dL Final    Glucose, Urine 12/18/2023 NEGATIVE  NEGATIVE mg/dL Final    Blood, Urine 12/18/2023 NEGATIVE  NEGATIVE Final    Ketones, Urine 12/18/2023 NEGATIVE  NEGATIVE mg/dL Final    Bilirubin, Urine 12/18/2023 NEGATIVE  NEGATIVE Final    Urobilinogen, Urine 12/18/2023 <2.0  <2.0 mg/dL Final    Nitrite, Urine 12/18/2023 NEGATIVE  NEGATIVE Final    Leukocyte Esterase, Urine 12/18/2023 NEGATIVE  NEGATIVE Final   Lab on 11/29/2023   Component Date Value Ref Range Status    WBC 11/29/2023 6.5  4.4 - 11.3 x10*3/uL Final    nRBC 11/29/2023    Final    RBC 11/29/2023 5.32  4.50 - 5.90 x10*6/uL Final    Hemoglobin 11/29/2023 14.3  13.5 - 17.5 g/dL Final    Hematocrit 11/29/2023 44.2  41.0 - 52.0 % Final    MCV 11/29/2023 83  80 - 100 fL Final    MCH 11/29/2023 26.9  26.0 - 34.0 pg Final    MCHC 11/29/2023 32.4  32.0 - 36.0 g/dL Final    RDW 11/29/2023 12.6  11.5 - 14.5 % Final    Platelets 11/29/2023 204  150 - 450 x10*3/uL Final    Neutrophils % 11/29/2023 67.0  40.0 - 80.0 % Final    Immature Granulocytes %, Automated 11/29/2023 0.3  0.0 - 0.9 % Final    Lymphocytes % 11/29/2023 23.6  13.0 - 44.0 % Final    Monocytes % 11/29/2023 7.1  2.0 - 10.0 % Final    Eosinophils % 11/29/2023 1.2  0.0 - 6.0 % Final    Basophils % 11/29/2023 0.8  0.0 - 2.0 % Final    Neutrophils Absolute 11/29/2023 4.33  1.20 - 7.70 x10*3/uL Final    Immature Granulocytes Absolute, Au* 11/29/2023 0.02  0.00 - 0.70 x10*3/uL Final    Lymphocytes Absolute 11/29/2023 1.53  1.20 - 4.80 x10*3/uL Final    Monocytes Absolute 11/29/2023 0.46  0.10 - 1.00 x10*3/uL Final    Eosinophils Absolute 11/29/2023 0.08  0.00 - 0.70 x10*3/uL Final    Basophils Absolute 11/29/2023 0.05  0.00 - 0.10 x10*3/uL Final    Glucose 11/29/2023 189 (H)  74 - 99 mg/dL Final    Sodium 11/29/2023 142  136  - 145 mmol/L Final    Potassium 11/29/2023 4.4  3.5 - 5.3 mmol/L Final    Chloride 11/29/2023 106  98 - 107 mmol/L Final    Bicarbonate 11/29/2023 27  21 - 32 mmol/L Final    Anion Gap 11/29/2023 13  10 - 20 mmol/L Final    Urea Nitrogen 11/29/2023 15  6 - 23 mg/dL Final    Creatinine 11/29/2023 0.87  0.50 - 1.30 mg/dL Final    eGFR 11/29/2023 >90  >60 mL/min/1.73m*2 Final    Calcium 11/29/2023 10.1  8.6 - 10.6 mg/dL Final    Albumin 11/29/2023 4.5  3.4 - 5.0 g/dL Final    Alkaline Phosphatase 11/29/2023 85  33 - 136 U/L Final    Total Protein 11/29/2023 6.9  6.4 - 8.2 g/dL Final    AST 11/29/2023 24  9 - 39 U/L Final    Bilirubin, Total 11/29/2023 0.6  0.0 - 1.2 mg/dL Final    ALT 11/29/2023 43  10 - 52 U/L Final    GGT 11/29/2023 18  5 - 64 U/L Final    Adrenocorticotropic Hormone (ACTH) 11/29/2023 27.3  7.2 - 63.3 pg/mL Final    LDH 11/29/2023 124  84 - 246 U/L Final    Hepatitis B Core AB; IgM 11/29/2023 Nonreactive  Nonreactive Final    Hepatitis B Surface AG 11/29/2023 Nonreactive  Nonreactive Final    Hepatitis B Surface AB 11/29/2023 29.2 (H)  <10.0 mIU/mL Final    Hepatitis C AB 11/29/2023 Nonreactive  Nonreactive Final    HIV 1/2 Antigen/Antibody Screen wi* 11/29/2023 Nonreactive  Nonreactive Final    Thyroid Stimulating Hormone 11/29/2023 1.94  0.44 - 3.98 mIU/L Final   Lab on 11/28/2023   Component Date Value Ref Range Status    Hemoglobin A1C 11/28/2023 7.1 (H)  see below % Final    Estimated Average Glucose 11/28/2023 157  Not Established mg/dL Final    Glucose 11/28/2023 161 (H)  74 - 99 mg/dL Final    Sodium 11/28/2023 144  136 - 145 mmol/L Final    Potassium 11/28/2023 3.9  3.5 - 5.3 mmol/L Final    Chloride 11/28/2023 108 (H)  98 - 107 mmol/L Final    Bicarbonate 11/28/2023 28  21 - 32 mmol/L Final    Anion Gap 11/28/2023 12  10 - 20 mmol/L Final    Urea Nitrogen 11/28/2023 15  6 - 23 mg/dL Final    Creatinine 11/28/2023 0.93  0.50 - 1.30 mg/dL Final    eGFR 11/28/2023 >90  >60 mL/min/1.73m*2 Final     Calcium 11/28/2023 9.9  8.6 - 10.3 mg/dL Final       Radiology Result:  I have reviewed the latest Imaging in PACS and the findings are noted in this note. I discussed the results of the latest imaging with the patient. All previous imaging were reviewed at the time it was completed. Full records are available in the EMR for review as well.     MR pelvis w and wo IV contrast  MRI of the pelvis with and without IV contrast;  9/29/2023 10:23 am     IMPRESSION:  Complex, heterogeneously enhancing, partially necrotic intramuscular  right gluteus una mass. Imaging characteristics are aggressive  with underlying malignant lesion high in the differential. Recommend  orthopedic oncology consultation and image guided tissue sampling for  further evaluation.    CT chest wo IV contrast (11/17/23)  IMPRESSION:  1.  No evidence of intrathoracic metastatic disease     Pathology Results:  I have reviewed the full pathology report recorded in the EMR. The pertinent portions indicating diagnosis are listed here in the note. for details please refer to the full report recorded in the EMR.    Surgical pathology in process. Discussed in tumor board and the pathologist is confident regarding the sarcoma diagnosis.     Assessment and Plan:   Assessment/Plan      Mr. Jason Hollins is a 63 y.o. male with a diagnosis of localized sarcoma. Please see the evolution of the case listed above in the cancer history.     Today,   Mr. Hollins started RT on 12/18/23. He will increase Pazopanib to 800mg PO daily on 12/20/23. He also had elevation in the blood pressure to a maximum of 150/91 mmHg. I started him on Nifedipine ER 30mg PO daily on 12/19/23    Plan:  - Pazopanib start at 800mg po daily on 12/20/23.  - Blood test on 12/26/23.   - RTC as phone visit on 12/26/23.  - Added Nifedipine ER 30mg PO daily.      DISCLAIMER:   In preparing for this visit and writing this note, I reviewed all the previous electronic medical records (labs, imaging and  medical charts) of the patient available in the physician portal. Significant findings which helped in decision making are recorded  in this chart.     The plan was discussed with the patient. We gave him ample opportunities to ask questions. All questions were answered to his satisfaction and he verbalized understanding.       INSTRUCTIONS:  Mr. Jason Hollins ,  It was a pleasure talking to you today.    As discussed, please increase pazopanib to 800mg po daily from 12/20/23. I also added Nifedipine ER 30mg to your med to control your blood pressure. Please continue monitoring your blood pressure.     Instructions to take Pazopanib:  - Take 4 pills together (1 pill at a time). Do not crush the pill. Swallow as a whole tablet.   - Take 1 hour prior to meal or 2 hours after meal.   - Please do not drink or eat Grapefruit, [place] oranges, Pomelo and Star fruit (and their juice), while you are on this medication.  - If you are taking Pantoprazole, Omeprazole, Lansoprazole or Esmoprazole, then please stop these medicines while you are on Pazopanib.   - Monitor your blood pressure at home. If systolic BP goes above 150mmHg or diastolic BP goes above 94mmHg, then give us a call.   - Every physician/nurse, especially surgeons should know that you are on Pazopanib. Surgeries are CONTRAINDICATED when you are taking pazopanib. Please let your surgeon talk to us before they plan surgery for you.    Please make sure to make your appointments as we discussed. Your appointments should also be visible in your MyChart.     In case of an emergency please dial 911 or report to your nearest Emergency Room.  For all other questions, please do not hesitate to reach out to us at the number listed below.    Thank you for choosing [facility] at [facility].   We appreciate your visit.     MD Siomara Hernandez, PHILLIP Hoyt RN ([facility] location)  Maribel Jaffe RN ([place] location)    Sarcoma and  Cutaneous Oncology team    Phone: [phone]  Fax: [phone].

## 2023-12-19 ENCOUNTER — HOSPITAL ENCOUNTER (OUTPATIENT)
Dept: RADIATION ONCOLOGY | Facility: CLINIC | Age: 63
Setting detail: RADIATION/ONCOLOGY SERIES
Discharge: HOME | End: 2023-12-19
Payer: COMMERCIAL

## 2023-12-19 ENCOUNTER — TELEMEDICINE (OUTPATIENT)
Dept: HEMATOLOGY/ONCOLOGY | Facility: HOSPITAL | Age: 63
End: 2023-12-19
Payer: COMMERCIAL

## 2023-12-19 DIAGNOSIS — Z79.899 ENCOUNTER FOR MONITORING CARDIOTOXIC DRUG THERAPY: ICD-10-CM

## 2023-12-19 DIAGNOSIS — Z51.81 ENCOUNTER FOR MONITORING CARDIOTOXIC DRUG THERAPY: ICD-10-CM

## 2023-12-19 DIAGNOSIS — Z51.0 ENCOUNTER FOR ANTINEOPLASTIC RADIATION THERAPY: ICD-10-CM

## 2023-12-19 DIAGNOSIS — R53.0 NEOPLASTIC MALIGNANT RELATED FATIGUE: ICD-10-CM

## 2023-12-19 DIAGNOSIS — C49.9 SOFT TISSUE SARCOMA (MULTI): Primary | ICD-10-CM

## 2023-12-19 DIAGNOSIS — C49.21 MALIGNANT NEOPLASM OF CONNECTIVE AND SOFT TISSUE OF RIGHT LOWER LIMB, INCLUDING HIP (MULTI): ICD-10-CM

## 2023-12-19 DIAGNOSIS — I15.8 OTHER SECONDARY HYPERTENSION: ICD-10-CM

## 2023-12-19 LAB
APPEARANCE UR: CLEAR
BILIRUB UR STRIP.AUTO-MCNC: NEGATIVE MG/DL
COLOR UR: YELLOW
GLUCOSE UR STRIP.AUTO-MCNC: NEGATIVE MG/DL
KETONES UR STRIP.AUTO-MCNC: NEGATIVE MG/DL
LEUKOCYTE ESTERASE UR QL STRIP.AUTO: NEGATIVE
NITRITE UR QL STRIP.AUTO: NEGATIVE
PH UR STRIP.AUTO: 5 [PH]
PROT UR STRIP.AUTO-MCNC: NEGATIVE MG/DL
RAD ONC MSQ ACTUAL FRACTIONS DELIVERED: 2
RAD ONC MSQ ACTUAL SESSION DELIVERED DOSE: 200 CGRAY
RAD ONC MSQ ACTUAL TOTAL DOSE: 400 CGRAY
RAD ONC MSQ ELAPSED DAYS: 1
RAD ONC MSQ LAST DATE: NORMAL
RAD ONC MSQ PRESCRIBED FRACTIONAL DOSE: 200 CGRAY
RAD ONC MSQ PRESCRIBED NUMBER OF FRACTIONS: 25
RAD ONC MSQ PRESCRIBED TECHNIQUE: NORMAL
RAD ONC MSQ PRESCRIBED TOTAL DOSE: 5000 CGRAY
RAD ONC MSQ START DATE: NORMAL
RAD ONC MSQ TREATMENT COURSE NUMBER: 1
RAD ONC MSQ TREATMENT SITE: NORMAL
RBC # UR STRIP.AUTO: NEGATIVE /UL
SP GR UR STRIP.AUTO: 1.02
UROBILINOGEN UR STRIP.AUTO-MCNC: <2 MG/DL

## 2023-12-19 PROCEDURE — 77336 RADIATION PHYSICS CONSULT: CPT | Performed by: RADIOLOGY

## 2023-12-19 PROCEDURE — 77014 CHG CT GUIDANCE RADIATION THERAPY FLDS PLACEMENT: CPT | Performed by: STUDENT IN AN ORGANIZED HEALTH CARE EDUCATION/TRAINING PROGRAM

## 2023-12-19 PROCEDURE — 77386 HC INTENSITY-MODULATED RADIATION THERAPY (IMRT), COMPLEX: CPT | Performed by: RADIOLOGY

## 2023-12-19 PROCEDURE — 99443 PR PHYS/QHP TELEPHONE EVALUATION 21-30 MIN: CPT | Performed by: INTERNAL MEDICINE

## 2023-12-19 RX ORDER — NIFEDIPINE 30 MG/1
30 TABLET, FILM COATED, EXTENDED RELEASE ORAL
Qty: 90 TABLET | Refills: 3 | Status: SHIPPED | OUTPATIENT
Start: 2023-12-19 | End: 2024-04-27

## 2023-12-19 NOTE — PATIENT INSTRUCTIONS
Mr. Jason Hollins ,  It was a pleasure talking to you today.    As discussed, please increase pazopanib to 800mg po daily from 12/20/23. I also added Nifedipine ER 30mg to your med to control your blood pressure. Please continue monitoring your blood pressure.     Instructions to take Pazopanib:  - Take 4 pills together (1 pill at a time). Do not crush the pill. Swallow as a whole tablet.   - Take 1 hour prior to meal or 2 hours after meal.   - Please do not drink or eat Grapefruit, [place] oranges, Pomelo and Star fruit (and their juice), while you are on this medication.  - If you are taking Pantoprazole, Omeprazole, Lansoprazole or Esmoprazole, then please stop these medicines while you are on Pazopanib.   - Monitor your blood pressure at home. If systolic BP goes above 150mmHg or diastolic BP goes above 94mmHg, then give us a call.   - Every physician/nurse, especially surgeons should know that you are on Pazopanib. Surgeries are CONTRAINDICATED when you are taking pazopanib. Please let your surgeon talk to us before they plan surgery for you.    Please make sure to make your appointments as we discussed. Your appointments should also be visible in your MyChart.     In case of an emergency please dial 911 or report to your nearest Emergency Room.  For all other questions, please do not hesitate to reach out to us at the number listed below.    Thank you for choosing [facility] at [facility].   We appreciate your visit.     MD Siomara Hernnadez APRN Taylor Costello, RN ([facility] location)  Maribel Jaffe RN ([place] location)    Sarcoma and Cutaneous Oncology team    Phone: [phone]  Fax: [phone].

## 2023-12-20 ENCOUNTER — RADIATION ONCOLOGY OTV (OUTPATIENT)
Dept: RADIATION ONCOLOGY | Facility: CLINIC | Age: 63
End: 2023-12-20
Payer: COMMERCIAL

## 2023-12-20 VITALS
HEART RATE: 81 BPM | TEMPERATURE: 97 F | RESPIRATION RATE: 18 BRPM | SYSTOLIC BLOOD PRESSURE: 153 MMHG | OXYGEN SATURATION: 96 % | BODY MASS INDEX: 38.26 KG/M2 | WEIGHT: 266.65 LBS | DIASTOLIC BLOOD PRESSURE: 84 MMHG

## 2023-12-20 ASSESSMENT — PAIN SCALES - GENERAL: PAINLEVEL: 2

## 2023-12-20 NOTE — PROGRESS NOTES
Radiation Oncology On Treatment Visit    Patient Name:  Jason Hollins  MRN:  [id number]  :  1960    Referring Provider: No ref. provider found  Primary Care Provider: RAQUEL Gregory  Care Team: Patient Care Team:  RAQUEL Gregory as PCP - General  Chiki Carbajal MD as Consulting Physician (Hematology and Oncology)  Mikal Hawkins MD as Consulting Physician (Orthopaedic Surgery)  Desmond Humphries MD as Radiation Oncologist (Radiation Oncology)    Date of Service: 2023     Diagnosis:   Specialty Problems          Radiation Oncology Problems    Soft tissue sarcoma (CMS/HCC)         Treatment Summary:  Radiation Treatments       Active   Hip_R (Started on 2023)   Most recent fraction: 200 cGy given on 2023   Total given: 600 cGy / 5,000 cGy  (3 of 25 fractions)   Elapsed Days: 2   Technique: VMAT           Completed   No historical radiation treatments to show.             SUBJECTIVE: Energy fair. Pazopanib increased BP, picking up blood pressure medications today. Mild pain.       OBJECTIVE:   Vital Signs:  /84 (BP Location: Left arm, Patient Position: Sitting)   Pulse 81   Temp 36.1 °C (97 °F)   Resp 18   Wt 121 kg (266 lb 10.3 oz)   SpO2 96%   BMI 38.26 kg/m²    Pain Scale: The patient's current pain level was assessed.  They report currently having a pain of 2 out of 10.    Other Pertinent Findings:     Toxicity Assessment          2023    07:55   Toxicity Assessment   Treatment Site General   Anorexia Grade 0   Anxiety Grade 0   Dehydration Grade 0   Depression Grade 0   Dermatitis Radiation Grade 0   Diarrhea Grade 1   Fatigue Grade 0   Fibrosis Deep Connective Tissue Grade 0   Fracture Grade 0   Nausea Grade 0   Pain Grade 1   Treatment Related Secondary Malignancy Grade 0   Tumor Pain Grade 0   Vomiting Grade 0   Alopecia Grade 0   Erythroderma Grade 0   Pain of Skin Grade 0   Pruritus Grade 0   Rash Acneiform Grade 0   Skin Hyperpigmentation Grade 0    Skin Hypopigmentation Grade 0   Skin Induration Grade 0   Skin Ulceration Grade 0   Telangiectasia Grade 0          Concurrent systemic therapy: Pazopanib    Labs:   WBC   Date Value Ref Range Status   12/18/2023 7.3 4.4 - 11.3 x10*3/uL Final   11/29/2023 6.5 4.4 - 11.3 x10*3/uL Final     Hemoglobin   Date Value Ref Range Status   12/18/2023 15.8 13.5 - 17.5 g/dL Final   11/29/2023 14.3 13.5 - 17.5 g/dL Final     Hematocrit   Date Value Ref Range Status   12/18/2023 46.9 41.0 - 52.0 % Final   11/29/2023 44.2 41.0 - 52.0 % Final     Neutrophils Absolute   Date Value Ref Range Status   12/18/2023 4.68 1.20 - 7.70 x10*3/uL Final     Comment:     Percent differential counts (%) should be interpreted in the context of the absolute cell counts (cells/uL).   11/29/2023 4.33 1.20 - 7.70 x10*3/uL Final     Comment:     Percent differential counts (%) should be interpreted in the context of the absolute cell counts (cells/uL).     Platelets   Date Value Ref Range Status   12/18/2023 229 150 - 450 x10*3/uL Final   11/29/2023 204 150 - 450 x10*3/uL Final     Alkaline Phosphatase   Date Value Ref Range Status   12/18/2023 80 33 - 136 U/L Final   11/29/2023 85 33 - 136 U/L Final     AST   Date Value Ref Range Status   12/18/2023 24 9 - 39 U/L Final   11/29/2023 24 9 - 39 U/L Final     ALT   Date Value Ref Range Status   12/18/2023 32 10 - 52 U/L Final     Comment:     Patients treated with Sulfasalazine may generate falsely decreased results for ALT.   11/29/2023 43 10 - 52 U/L Final     Comment:     Patients treated with Sulfasalazine may generate falsely decreased results for ALT.     Bilirubin, Total   Date Value Ref Range Status   12/18/2023 1.0 0.0 - 1.2 mg/dL Final   11/29/2023 0.6 0.0 - 1.2 mg/dL Final     Glucose   Date Value Ref Range Status   12/18/2023 126 (H) 74 - 99 mg/dL Final   11/29/2023 189 (H) 74 - 99 mg/dL Final     Calcium   Date Value Ref Range Status   12/18/2023 9.7 8.6 - 10.3 mg/dL Final   11/29/2023  10.1 8.6 - 10.6 mg/dL Final     Sodium   Date Value Ref Range Status   12/18/2023 142 136 - 145 mmol/L Final   11/29/2023 142 136 - 145 mmol/L Final     Potassium   Date Value Ref Range Status   12/18/2023 4.0 3.5 - 5.3 mmol/L Final   11/29/2023 4.4 3.5 - 5.3 mmol/L Final     Bicarbonate   Date Value Ref Range Status   12/18/2023 24 21 - 32 mmol/L Final   11/29/2023 27 21 - 32 mmol/L Final     Chloride   Date Value Ref Range Status   12/18/2023 106 98 - 107 mmol/L Final   11/29/2023 106 98 - 107 mmol/L Final     Urea Nitrogen   Date Value Ref Range Status   12/18/2023 13 6 - 23 mg/dL Final   11/29/2023 15 6 - 23 mg/dL Final     Creatinine   Date Value Ref Range Status   12/18/2023 0.92 0.50 - 1.30 mg/dL Final   11/29/2023 0.87 0.50 - 1.30 mg/dL Final         Exam: Resting comfortable in chair.       Assessment / Plan:  The patient is tolerating radiation therapy as anticipated.  Continue per current treatment plan.         Therapies: Discussed expected side effects. Will monitor tissue changes and skin reaction.      Side effects reviewed with patient. Images, chart and labs reviewed.    Joby Birch MD

## 2023-12-21 ENCOUNTER — HOSPITAL ENCOUNTER (OUTPATIENT)
Dept: RADIATION ONCOLOGY | Facility: CLINIC | Age: 63
Setting detail: RADIATION/ONCOLOGY SERIES
Discharge: HOME | End: 2023-12-21
Payer: COMMERCIAL

## 2023-12-21 DIAGNOSIS — C49.21 MALIGNANT NEOPLASM OF CONNECTIVE AND SOFT TISSUE OF RIGHT LOWER LIMB, INCLUDING HIP (MULTI): ICD-10-CM

## 2023-12-21 DIAGNOSIS — Z51.0 ENCOUNTER FOR ANTINEOPLASTIC RADIATION THERAPY: ICD-10-CM

## 2023-12-21 LAB
RAD ONC MSQ ACTUAL FRACTIONS DELIVERED: 4
RAD ONC MSQ ACTUAL SESSION DELIVERED DOSE: 200 CGRAY
RAD ONC MSQ ACTUAL TOTAL DOSE: 800 CGRAY
RAD ONC MSQ ELAPSED DAYS: 3
RAD ONC MSQ LAST DATE: NORMAL
RAD ONC MSQ PRESCRIBED FRACTIONAL DOSE: 200 CGRAY
RAD ONC MSQ PRESCRIBED NUMBER OF FRACTIONS: 25
RAD ONC MSQ PRESCRIBED TECHNIQUE: NORMAL
RAD ONC MSQ PRESCRIBED TOTAL DOSE: 5000 CGRAY
RAD ONC MSQ PRESCRIPTION PATTERN COMMENT: NORMAL
RAD ONC MSQ START DATE: NORMAL
RAD ONC MSQ TREATMENT COURSE NUMBER: 1
RAD ONC MSQ TREATMENT SITE: NORMAL

## 2023-12-21 PROCEDURE — 77014 CHG CT GUIDANCE RADIATION THERAPY FLDS PLACEMENT: CPT | Performed by: STUDENT IN AN ORGANIZED HEALTH CARE EDUCATION/TRAINING PROGRAM

## 2023-12-21 PROCEDURE — 77386 HC INTENSITY-MODULATED RADIATION THERAPY (IMRT), COMPLEX: CPT | Performed by: STUDENT IN AN ORGANIZED HEALTH CARE EDUCATION/TRAINING PROGRAM

## 2023-12-22 ENCOUNTER — HOSPITAL ENCOUNTER (OUTPATIENT)
Dept: RADIATION ONCOLOGY | Facility: CLINIC | Age: 63
Setting detail: RADIATION/ONCOLOGY SERIES
Discharge: HOME | End: 2023-12-22
Payer: COMMERCIAL

## 2023-12-22 DIAGNOSIS — Z51.0 ENCOUNTER FOR ANTINEOPLASTIC RADIATION THERAPY: ICD-10-CM

## 2023-12-22 DIAGNOSIS — C49.21 MALIGNANT NEOPLASM OF CONNECTIVE AND SOFT TISSUE OF RIGHT LOWER LIMB, INCLUDING HIP (MULTI): ICD-10-CM

## 2023-12-22 LAB
RAD ONC MSQ ACTUAL FRACTIONS DELIVERED: 5
RAD ONC MSQ ACTUAL SESSION DELIVERED DOSE: 200 CGRAY
RAD ONC MSQ ACTUAL TOTAL DOSE: 1000 CGRAY
RAD ONC MSQ ELAPSED DAYS: 4
RAD ONC MSQ LAST DATE: NORMAL
RAD ONC MSQ PRESCRIBED FRACTIONAL DOSE: 200 CGRAY
RAD ONC MSQ PRESCRIBED NUMBER OF FRACTIONS: 25
RAD ONC MSQ PRESCRIBED TECHNIQUE: NORMAL
RAD ONC MSQ PRESCRIBED TOTAL DOSE: 5000 CGRAY
RAD ONC MSQ PRESCRIPTION PATTERN COMMENT: NORMAL
RAD ONC MSQ START DATE: NORMAL
RAD ONC MSQ TREATMENT COURSE NUMBER: 1
RAD ONC MSQ TREATMENT SITE: NORMAL

## 2023-12-22 PROCEDURE — 77386 HC INTENSITY-MODULATED RADIATION THERAPY (IMRT), COMPLEX: CPT | Performed by: STUDENT IN AN ORGANIZED HEALTH CARE EDUCATION/TRAINING PROGRAM

## 2023-12-22 PROCEDURE — 77014 CHG CT GUIDANCE RADIATION THERAPY FLDS PLACEMENT: CPT | Performed by: RADIOLOGY

## 2023-12-26 ENCOUNTER — HOSPITAL ENCOUNTER (OUTPATIENT)
Dept: RADIATION ONCOLOGY | Facility: CLINIC | Age: 63
Setting detail: RADIATION/ONCOLOGY SERIES
Discharge: HOME | End: 2023-12-26
Payer: COMMERCIAL

## 2023-12-26 ENCOUNTER — LAB (OUTPATIENT)
Dept: LAB | Facility: LAB | Age: 63
End: 2023-12-26
Payer: COMMERCIAL

## 2023-12-26 ENCOUNTER — APPOINTMENT (OUTPATIENT)
Dept: HEMATOLOGY/ONCOLOGY | Facility: HOSPITAL | Age: 63
End: 2023-12-26
Payer: COMMERCIAL

## 2023-12-26 DIAGNOSIS — C49.9 SOFT TISSUE SARCOMA (MULTI): ICD-10-CM

## 2023-12-26 DIAGNOSIS — C49.21 MALIGNANT NEOPLASM OF CONNECTIVE AND SOFT TISSUE OF RIGHT LOWER LIMB, INCLUDING HIP (MULTI): ICD-10-CM

## 2023-12-26 DIAGNOSIS — Z79.899 ENCOUNTER FOR MONITORING CARDIOTOXIC DRUG THERAPY: ICD-10-CM

## 2023-12-26 DIAGNOSIS — N40.0 PROSTATE ENLARGEMENT: ICD-10-CM

## 2023-12-26 DIAGNOSIS — R53.0 NEOPLASTIC MALIGNANT RELATED FATIGUE: ICD-10-CM

## 2023-12-26 DIAGNOSIS — Z51.81 ENCOUNTER FOR MONITORING CARDIOTOXIC DRUG THERAPY: ICD-10-CM

## 2023-12-26 DIAGNOSIS — Z51.0 ENCOUNTER FOR ANTINEOPLASTIC RADIATION THERAPY: ICD-10-CM

## 2023-12-26 LAB
RAD ONC MSQ ACTUAL FRACTIONS DELIVERED: 6
RAD ONC MSQ ACTUAL SESSION DELIVERED DOSE: 200 CGRAY
RAD ONC MSQ ACTUAL TOTAL DOSE: 1200 CGRAY
RAD ONC MSQ ELAPSED DAYS: 8
RAD ONC MSQ LAST DATE: NORMAL
RAD ONC MSQ PRESCRIBED FRACTIONAL DOSE: 200 CGRAY
RAD ONC MSQ PRESCRIBED NUMBER OF FRACTIONS: 25
RAD ONC MSQ PRESCRIBED TECHNIQUE: NORMAL
RAD ONC MSQ PRESCRIBED TOTAL DOSE: 5000 CGRAY
RAD ONC MSQ PRESCRIPTION PATTERN COMMENT: NORMAL
RAD ONC MSQ START DATE: NORMAL
RAD ONC MSQ TREATMENT COURSE NUMBER: 1
RAD ONC MSQ TREATMENT SITE: NORMAL

## 2023-12-26 PROCEDURE — 80053 COMPREHEN METABOLIC PANEL: CPT

## 2023-12-26 PROCEDURE — 84153 ASSAY OF PSA TOTAL: CPT

## 2023-12-26 PROCEDURE — 85025 COMPLETE CBC W/AUTO DIFF WBC: CPT

## 2023-12-26 PROCEDURE — 77014 CHG CT GUIDANCE RADIATION THERAPY FLDS PLACEMENT: CPT | Performed by: STUDENT IN AN ORGANIZED HEALTH CARE EDUCATION/TRAINING PROGRAM

## 2023-12-26 PROCEDURE — 77336 RADIATION PHYSICS CONSULT: CPT | Performed by: STUDENT IN AN ORGANIZED HEALTH CARE EDUCATION/TRAINING PROGRAM

## 2023-12-26 PROCEDURE — 83615 LACTATE (LD) (LDH) ENZYME: CPT

## 2023-12-26 PROCEDURE — 36415 COLL VENOUS BLD VENIPUNCTURE: CPT

## 2023-12-26 PROCEDURE — 81003 URINALYSIS AUTO W/O SCOPE: CPT

## 2023-12-26 PROCEDURE — 77386 HC INTENSITY-MODULATED RADIATION THERAPY (IMRT), COMPLEX: CPT | Performed by: STUDENT IN AN ORGANIZED HEALTH CARE EDUCATION/TRAINING PROGRAM

## 2023-12-26 PROCEDURE — 84443 ASSAY THYROID STIM HORMONE: CPT

## 2023-12-27 ENCOUNTER — HOSPITAL ENCOUNTER (OUTPATIENT)
Dept: RADIATION ONCOLOGY | Facility: CLINIC | Age: 63
Setting detail: RADIATION/ONCOLOGY SERIES
Discharge: HOME | End: 2023-12-27
Payer: COMMERCIAL

## 2023-12-27 ENCOUNTER — RADIATION ONCOLOGY OTV (OUTPATIENT)
Dept: RADIATION ONCOLOGY | Facility: CLINIC | Age: 63
End: 2023-12-27
Payer: COMMERCIAL

## 2023-12-27 VITALS
BODY MASS INDEX: 38.4 KG/M2 | HEART RATE: 87 BPM | OXYGEN SATURATION: 97 % | SYSTOLIC BLOOD PRESSURE: 129 MMHG | RESPIRATION RATE: 18 BRPM | WEIGHT: 267.64 LBS | DIASTOLIC BLOOD PRESSURE: 79 MMHG | TEMPERATURE: 97.7 F

## 2023-12-27 DIAGNOSIS — Z51.0 ENCOUNTER FOR ANTINEOPLASTIC RADIATION THERAPY: ICD-10-CM

## 2023-12-27 DIAGNOSIS — C49.21 MALIGNANT NEOPLASM OF CONNECTIVE AND SOFT TISSUE OF RIGHT LOWER LIMB, INCLUDING HIP (MULTI): ICD-10-CM

## 2023-12-27 LAB
ALBUMIN SERPL BCP-MCNC: 4.5 G/DL (ref 3.4–5)
ALP SERPL-CCNC: 92 U/L (ref 33–136)
ALT SERPL W P-5'-P-CCNC: 25 U/L (ref 10–52)
ANION GAP SERPL CALC-SCNC: 15 MMOL/L (ref 10–20)
APPEARANCE UR: ABNORMAL
AST SERPL W P-5'-P-CCNC: 17 U/L (ref 9–39)
BASOPHILS # BLD AUTO: 0.05 X10*3/UL (ref 0–0.1)
BASOPHILS NFR BLD AUTO: 0.8 %
BILIRUB SERPL-MCNC: 0.8 MG/DL (ref 0–1.2)
BILIRUB UR STRIP.AUTO-MCNC: NEGATIVE MG/DL
BUN SERPL-MCNC: 17 MG/DL (ref 6–23)
CALCIUM SERPL-MCNC: 10.2 MG/DL (ref 8.6–10.6)
CHLORIDE SERPL-SCNC: 107 MMOL/L (ref 98–107)
CO2 SERPL-SCNC: 26 MMOL/L (ref 21–32)
COLOR UR: ABNORMAL
CREAT SERPL-MCNC: 0.95 MG/DL (ref 0.5–1.3)
EOSINOPHIL # BLD AUTO: 0.16 X10*3/UL (ref 0–0.7)
EOSINOPHIL NFR BLD AUTO: 2.5 %
ERYTHROCYTE [DISTWIDTH] IN BLOOD BY AUTOMATED COUNT: 12.1 % (ref 11.5–14.5)
GFR SERPL CREATININE-BSD FRML MDRD: 90 ML/MIN/1.73M*2
GLUCOSE SERPL-MCNC: 145 MG/DL (ref 74–99)
GLUCOSE UR STRIP.AUTO-MCNC: ABNORMAL MG/DL
HCT VFR BLD AUTO: 46.5 % (ref 41–52)
HGB BLD-MCNC: 15.3 G/DL (ref 13.5–17.5)
IMM GRANULOCYTES # BLD AUTO: 0.02 X10*3/UL (ref 0–0.7)
IMM GRANULOCYTES NFR BLD AUTO: 0.3 % (ref 0–0.9)
KETONES UR STRIP.AUTO-MCNC: ABNORMAL MG/DL
LDH SERPL L TO P-CCNC: 132 U/L (ref 84–246)
LEUKOCYTE ESTERASE UR QL STRIP.AUTO: NEGATIVE
LYMPHOCYTES # BLD AUTO: 1.5 X10*3/UL (ref 1.2–4.8)
LYMPHOCYTES NFR BLD AUTO: 23.1 %
MCH RBC QN AUTO: 26.8 PG (ref 26–34)
MCHC RBC AUTO-ENTMCNC: 32.9 G/DL (ref 32–36)
MCV RBC AUTO: 82 FL (ref 80–100)
MONOCYTES # BLD AUTO: 0.47 X10*3/UL (ref 0.1–1)
MONOCYTES NFR BLD AUTO: 7.2 %
NEUTROPHILS # BLD AUTO: 4.29 X10*3/UL (ref 1.2–7.7)
NEUTROPHILS NFR BLD AUTO: 66.1 %
NITRITE UR QL STRIP.AUTO: NEGATIVE
NRBC BLD-RTO: 0 /100 WBCS (ref 0–0)
PH UR STRIP.AUTO: 5 [PH]
PLATELET # BLD AUTO: 227 X10*3/UL (ref 150–450)
POTASSIUM SERPL-SCNC: 4 MMOL/L (ref 3.5–5.3)
PROT SERPL-MCNC: 6.9 G/DL (ref 6.4–8.2)
PROT UR STRIP.AUTO-MCNC: NEGATIVE MG/DL
PSA SERPL-MCNC: 1 NG/ML
RAD ONC MSQ ACTUAL FRACTIONS DELIVERED: 7
RAD ONC MSQ ACTUAL SESSION DELIVERED DOSE: 200 CGRAY
RAD ONC MSQ ACTUAL TOTAL DOSE: 1400 CGRAY
RAD ONC MSQ ELAPSED DAYS: 9
RAD ONC MSQ LAST DATE: NORMAL
RAD ONC MSQ PRESCRIBED FRACTIONAL DOSE: 200 CGRAY
RAD ONC MSQ PRESCRIBED NUMBER OF FRACTIONS: 25
RAD ONC MSQ PRESCRIBED TECHNIQUE: NORMAL
RAD ONC MSQ PRESCRIBED TOTAL DOSE: 5000 CGRAY
RAD ONC MSQ PRESCRIPTION PATTERN COMMENT: NORMAL
RAD ONC MSQ START DATE: NORMAL
RAD ONC MSQ TREATMENT COURSE NUMBER: 1
RAD ONC MSQ TREATMENT SITE: NORMAL
RBC # BLD AUTO: 5.7 X10*6/UL (ref 4.5–5.9)
RBC # UR STRIP.AUTO: NEGATIVE /UL
SODIUM SERPL-SCNC: 144 MMOL/L (ref 136–145)
SP GR UR STRIP.AUTO: 1.02
TSH SERPL-ACNC: 2.22 MIU/L (ref 0.44–3.98)
UROBILINOGEN UR STRIP.AUTO-MCNC: 4 MG/DL
WBC # BLD AUTO: 6.5 X10*3/UL (ref 4.4–11.3)

## 2023-12-27 PROCEDURE — 77427 RADIATION TX MANAGEMENT X5: CPT | Performed by: STUDENT IN AN ORGANIZED HEALTH CARE EDUCATION/TRAINING PROGRAM

## 2023-12-27 PROCEDURE — 77386 HC INTENSITY-MODULATED RADIATION THERAPY (IMRT), COMPLEX: CPT | Performed by: STUDENT IN AN ORGANIZED HEALTH CARE EDUCATION/TRAINING PROGRAM

## 2023-12-27 PROCEDURE — 77014 CHG CT GUIDANCE RADIATION THERAPY FLDS PLACEMENT: CPT | Performed by: STUDENT IN AN ORGANIZED HEALTH CARE EDUCATION/TRAINING PROGRAM

## 2023-12-27 ASSESSMENT — PAIN SCALES - GENERAL: PAINLEVEL: 1

## 2023-12-27 NOTE — PROGRESS NOTES
Radiation Oncology On Treatment Visit    Patient Name:  Jason Hollins  MRN:  [id number]  :  1960    Referring Provider: No ref. provider found  Primary Care Provider: RAQUEL Gregory  Care Team: Patient Care Team:  RAQUEL Gregory as PCP - General  Chiki Carbajal MD as Consulting Physician (Hematology and Oncology)  Mikal Hawkins MD as Consulting Physician (Orthopaedic Surgery)  Desmond Humphries MD as Radiation Oncologist (Radiation Oncology)    Date of Service: 2023     Diagnosis:   Specialty Problems          Radiation Oncology Problems    Soft tissue sarcoma (CMS/HCC)         Treatment Summary:  Radiation Treatments       Active   Hip_R (Started on 2023)   Most recent fraction: 200 cGy given on 2023   Total given: 1,400 cGy / 5,000 cGy  (7 of 25 fractions)   Elapsed Days: 9   Technique: VMAT           Completed   No historical radiation treatments to show.             SUBJECTIVE: Single episode of vomiting, with 1-2 episodes of diarrhea since starting his new diabetes medication.      OBJECTIVE:   Vital Signs:  /79 (BP Location: Left arm, Patient Position: Sitting)   Pulse 87   Temp 36.5 °C (97.7 °F)   Resp 18   Wt 121 kg (267 lb 10.2 oz)   SpO2 97%   BMI 38.40 kg/m²    Pain Scale: The patient's current pain level was assessed.  They report currently having a pain of 1 out of 10.    Other Pertinent Findings:     Toxicity Assessment          2023    07:55 2023    07:34   Toxicity Assessment   Treatment Site General General   Anorexia Grade 0 Grade 0   Anxiety Grade 0 Grade 0   Dehydration Grade 0 Grade 0   Depression Grade 0 Grade 0   Dermatitis Radiation Grade 0 Grade 0   Diarrhea Grade 1 Grade 1       last couple days 1-2 times   Fatigue Grade 0 Grade 0   Fibrosis Deep Connective Tissue Grade 0 Grade 0   Fracture Grade 0 Grade 0   Nausea Grade 0 Grade 0   Pain Grade 1 Grade 1   Treatment Related Secondary Malignancy Grade 0 Grade 0   Tumor Pain  Grade 0 Grade 0   Vomiting Grade 0 Grade 0   Alopecia Grade 0 Grade 0   Erythroderma Grade 0 Grade 0   Pain of Skin Grade 0 Grade 0   Pruritus Grade 0 Grade 0   Rash Acneiform Grade 0 Grade 0   Skin Hyperpigmentation Grade 0 Grade 0   Skin Hypopigmentation Grade 0 Grade 0   Skin Induration Grade 0 Grade 0   Skin Ulceration Grade 0 Grade 0   Telangiectasia Grade 0 Grade 0          Concurrent systemic therapy: Pazopanib    Labs:   WBC   Date Value Ref Range Status   12/26/2023 6.5 4.4 - 11.3 x10*3/uL Final   12/18/2023 7.3 4.4 - 11.3 x10*3/uL Final     Hemoglobin   Date Value Ref Range Status   12/26/2023 15.3 13.5 - 17.5 g/dL Final   12/18/2023 15.8 13.5 - 17.5 g/dL Final     Hematocrit   Date Value Ref Range Status   12/26/2023 46.5 41.0 - 52.0 % Final   12/18/2023 46.9 41.0 - 52.0 % Final     Neutrophils Absolute   Date Value Ref Range Status   12/26/2023 4.29 1.20 - 7.70 x10*3/uL Final     Comment:     Percent differential counts (%) should be interpreted in the context of the absolute cell counts (cells/uL).   12/18/2023 4.68 1.20 - 7.70 x10*3/uL Final     Comment:     Percent differential counts (%) should be interpreted in the context of the absolute cell counts (cells/uL).     Platelets   Date Value Ref Range Status   12/26/2023 227 150 - 450 x10*3/uL Final   12/18/2023 229 150 - 450 x10*3/uL Final     Alkaline Phosphatase   Date Value Ref Range Status   12/26/2023 92 33 - 136 U/L Final   12/18/2023 80 33 - 136 U/L Final     AST   Date Value Ref Range Status   12/26/2023 17 9 - 39 U/L Final   12/18/2023 24 9 - 39 U/L Final     ALT   Date Value Ref Range Status   12/26/2023 25 10 - 52 U/L Final     Comment:     Patients treated with Sulfasalazine may generate falsely decreased results for ALT.   12/18/2023 32 10 - 52 U/L Final     Comment:     Patients treated with Sulfasalazine may generate falsely decreased results for ALT.     Bilirubin, Total   Date Value Ref Range Status   12/26/2023 0.8 0.0 - 1.2 mg/dL  Final   12/18/2023 1.0 0.0 - 1.2 mg/dL Final     Glucose   Date Value Ref Range Status   12/26/2023 145 (H) 74 - 99 mg/dL Final   12/18/2023 126 (H) 74 - 99 mg/dL Final     Calcium   Date Value Ref Range Status   12/26/2023 10.2 8.6 - 10.6 mg/dL Final   12/18/2023 9.7 8.6 - 10.3 mg/dL Final     Sodium   Date Value Ref Range Status   12/26/2023 144 136 - 145 mmol/L Final   12/18/2023 142 136 - 145 mmol/L Final     Potassium   Date Value Ref Range Status   12/26/2023 4.0 3.5 - 5.3 mmol/L Final   12/18/2023 4.0 3.5 - 5.3 mmol/L Final     Bicarbonate   Date Value Ref Range Status   12/26/2023 26 21 - 32 mmol/L Final   12/18/2023 24 21 - 32 mmol/L Final     Chloride   Date Value Ref Range Status   12/26/2023 107 98 - 107 mmol/L Final   12/18/2023 106 98 - 107 mmol/L Final     Urea Nitrogen   Date Value Ref Range Status   12/26/2023 17 6 - 23 mg/dL Final   12/18/2023 13 6 - 23 mg/dL Final     Creatinine   Date Value Ref Range Status   12/26/2023 0.95 0.50 - 1.30 mg/dL Final   12/18/2023 0.92 0.50 - 1.30 mg/dL Final         Exam: Resting comfortable in chair.     Assessment / Plan:  The patient is tolerating radiation therapy as anticipated.  Continue per current treatment plan.       Therapies: Monitor GI symptoms. Rest as needed for fatigue.     Side effects reviewed with patient. Images, chart and labs reviewed.    Joby Birch MD

## 2023-12-28 ENCOUNTER — OFFICE VISIT (OUTPATIENT)
Dept: HEMATOLOGY/ONCOLOGY | Facility: CLINIC | Age: 63
End: 2023-12-28
Payer: COMMERCIAL

## 2023-12-28 ENCOUNTER — HOSPITAL ENCOUNTER (OUTPATIENT)
Dept: RADIATION ONCOLOGY | Facility: CLINIC | Age: 63
Setting detail: RADIATION/ONCOLOGY SERIES
Discharge: HOME | End: 2023-12-28
Payer: COMMERCIAL

## 2023-12-28 DIAGNOSIS — E11.69 DIABETES MELLITUS TYPE 2 IN OBESE: ICD-10-CM

## 2023-12-28 DIAGNOSIS — C49.21 MALIGNANT NEOPLASM OF CONNECTIVE AND SOFT TISSUE OF RIGHT LOWER LIMB, INCLUDING HIP (MULTI): ICD-10-CM

## 2023-12-28 DIAGNOSIS — E66.9 DIABETES MELLITUS TYPE 2 IN OBESE: ICD-10-CM

## 2023-12-28 DIAGNOSIS — Z51.0 ENCOUNTER FOR ANTINEOPLASTIC RADIATION THERAPY: ICD-10-CM

## 2023-12-28 LAB
RAD ONC MSQ ACTUAL FRACTIONS DELIVERED: 8
RAD ONC MSQ ACTUAL SESSION DELIVERED DOSE: 200 CGRAY
RAD ONC MSQ ACTUAL TOTAL DOSE: 1600 CGRAY
RAD ONC MSQ ELAPSED DAYS: 10
RAD ONC MSQ LAST DATE: NORMAL
RAD ONC MSQ PRESCRIBED FRACTIONAL DOSE: 200 CGRAY
RAD ONC MSQ PRESCRIBED NUMBER OF FRACTIONS: 25
RAD ONC MSQ PRESCRIBED TECHNIQUE: NORMAL
RAD ONC MSQ PRESCRIBED TOTAL DOSE: 5000 CGRAY
RAD ONC MSQ PRESCRIPTION PATTERN COMMENT: NORMAL
RAD ONC MSQ START DATE: NORMAL
RAD ONC MSQ TREATMENT COURSE NUMBER: 1
RAD ONC MSQ TREATMENT SITE: NORMAL

## 2023-12-28 PROCEDURE — 77014 CHG CT GUIDANCE RADIATION THERAPY FLDS PLACEMENT: CPT | Performed by: STUDENT IN AN ORGANIZED HEALTH CARE EDUCATION/TRAINING PROGRAM

## 2023-12-28 PROCEDURE — 1036F TOBACCO NON-USER: CPT | Performed by: NURSE PRACTITIONER

## 2023-12-28 PROCEDURE — 77386 HC INTENSITY-MODULATED RADIATION THERAPY (IMRT), COMPLEX: CPT | Performed by: STUDENT IN AN ORGANIZED HEALTH CARE EDUCATION/TRAINING PROGRAM

## 2023-12-28 PROCEDURE — 99443 PR PHYS/QHP TELEPHONE EVALUATION 21-30 MIN: CPT | Performed by: NURSE PRACTITIONER

## 2023-12-28 PROCEDURE — 3008F BODY MASS INDEX DOCD: CPT | Performed by: NURSE PRACTITIONER

## 2023-12-28 PROCEDURE — 3051F HG A1C>EQUAL 7.0%<8.0%: CPT | Performed by: NURSE PRACTITIONER

## 2023-12-28 ASSESSMENT — ENCOUNTER SYMPTOMS
CONSTITUTIONAL NEGATIVE: 1
MUSCULOSKELETAL NEGATIVE: 1
RESPIRATORY NEGATIVE: 1
HEMATOLOGIC/LYMPHATIC NEGATIVE: 1
GASTROINTESTINAL NEGATIVE: 1
PSYCHIATRIC NEGATIVE: 1
NEUROLOGICAL NEGATIVE: 1
ENDOCRINE NEGATIVE: 1
EYES NEGATIVE: 1
CARDIOVASCULAR NEGATIVE: 1

## 2023-12-28 NOTE — PROGRESS NOTES
".Patient ID: Jason Hollins is a 63 y.o. male.  Referring Physician: No referring provider defined for this encounter.  Primary Care Provider: PHILLIP Grgeory-CNP     Oncology follow up    HPI  Mr. Jason Hollins is referred by Dr. 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 He met with our team on 11/29/23.      Mr. Hollins first noticed a mass in the right buttock in June 2023. This was eventually investigated by his PCP and he was found to have a mass. MRI on 09/20/23 reported a 14.9 x 12.7 x 8.3 cm predominantly T2 hyperintense, T1 hypointense, heterogeneously enhancing, encapsulated intramuscular solid lesion in the right gluteus una muscle. He was referred to Dr. Mikal Hawkins, whom he met on 10/25/23. CT guided biopsy of the mass was pursued on 11/13/2023. The pathology slides were discussed in the tumor board and a diagnosis of sarcoma was rendered. He was referred to Radiation Oncology and Medical Oncology for planning pre-operative radiation and neoadjuvant pazopanib.     He met us on 11/29/23 and we prescribed the pazopanib. He received this medicine on 12/11/23 and he started pazopanib at 400mg on 12/14/23. His labs were within normal limits on 12/18/23 and we increased the dose to 800mg on 12/20/23. We also added nifedipine ER 30mg on 12/19/23 for Grade 1 HTN (BP highest read- 150/91 mmHg)     Systemic Treatment:  A Pazopanib concurrent with Radiation.   Started on 12/14/23 at 400mg PO daily. Increased to 800mg PO daily on 12/20/23.  Radiation started on 12/18/23    Subjective   Please refer to \"Notes/Cancer History\" above for complete History of present illness.     Mr Jason Hollins     - Overall feels well.  - Checking BP at home. 129/92, 127/89.   - No new complaints or issues.      Review of Systems:   Review of Systems   Constitutional: Negative.    HENT:  Negative.     Eyes: Negative.    Respiratory: Negative.     Cardiovascular: Negative.    Gastrointestinal: " Negative.    Endocrine: Negative.    Genitourinary: Negative.     Musculoskeletal: Negative.    Skin: Negative.    Neurological: Negative.    Hematological: Negative.    Psychiatric/Behavioral: Negative.           MEDICAL HISTORY  Past Medical History:   Diagnosis Date    Morbid (severe) obesity due to excess calories (CMS/HCC)     Severe obesity (BMI 35.0-35.9 with comorbidity)    Morbid (severe) obesity due to excess calories (CMS/HCC) 05/31/2016    Severe obesity (BMI 35.0-39.9) with comorbidity    Personal history of other endocrine, nutritional and metabolic disease     History of diabetes mellitus    Personal history of other endocrine, nutritional and metabolic disease     History of hyperlipidemia       FAMILY HISTORY  Family History   Problem Relation Name Age of Onset    Coronary artery disease Mother      Diabetes Mother      Hypertension Mother      Cancer Father      Diabetes Other Grandmother        TOBACCO HISTORY  Tobacco Use: Low Risk  (12/1/2023)    Patient History     Smoking Tobacco Use: Never     Smokeless Tobacco Use: Never     Passive Exposure: Not on file       SOCIAL HISTORY  Social Connections: Not on file        Outpatient Medication Profile:  Current Outpatient Medications on File Prior to Visit   Medication Sig Dispense Refill    aspirin 81 mg EC tablet Take 1 tablet (81 mg) by mouth once daily. AS DIRECTED.      atorvastatin (Lipitor) 40 mg tablet Take 1 tablet (40 mg) by mouth once daily. 90 tablet 1    blood sugar diagnostic (Blood Glucose Test) strip once daily. One Drop Test In Vitro Strip; Test      dulaglutide (Trulicity) 4.5 mg/0.5 mL pen injector Inject 4.5 mg under the skin 1 (one) time per week. 2 mL 2    dulaglutide (TRULICITY) 4.5 mg/0.5 mL pen injector INJECT ONE PEN (4.5 MG) UNDER THE SKIN ONCE WEEKLY 2 mL 2    ergocalciferol (Vitamin D-2) 1.25 MG (41674 UT) capsule Take 1 capsule (50,000 Units) by mouth every 14 (fourteen) days. twice monthly on the 15th and the 30 th for  vitamin d deficiency 6 capsule 1    fish oil concentrate (Omega-3) 120-180 mg capsule Take 3 capsules (3,000 mg) by mouth.      gabapentin (Neurontin) 100 mg capsule Take one capsule and increase by 1 capsule every 4 days until taking 3 capsules 90 capsule 1    gabapentin (Neurontin) 300 mg capsule Use the 100 mg capsule to titrate to 300 mg , then use the 300 mg capsule and take 1 to 2 capsules hs for pain 90 capsule 2    ibuprofen 800 mg tablet Take 1 tablet (800 mg) by mouth 3 times a day as needed for mild pain (1 - 3) (pain). 180 tablet 3    metFORMIN (Glucophage) 1,000 mg tablet TAKE 1 TABLET BY MOUTH EVERY MORNING AND 1.5 TABLETS BY MOUTH EVERY EVENING , 225 tablet 0    multivitamin tablet Take 1 tablet by mouth once daily.      NIFEdipine ER (NIFEdipine XL) 30 mg 24 hr tablet Take 1 tablet (30 mg) by mouth once daily in the morning. Take before meals. Do not crush, chew, or split. 90 tablet 3    OneTouch Ultra Test strip Test blood sugar once daily 100 strip 3    PAZOPanib (Votrient) 200 mg tablet Take 4 tablets (800 mg total) by mouth once daily.  Take on an empty stomach, at least 1 hour before or 2 hours after a meal. 112 tablet 3    sildenafil (Viagra) 100 mg tablet Take 1 tablet (100 mg) by mouth once daily as needed (1 hour before needed). 10 tablet 2    tiZANidine (Zanaflex) 4 mg capsule Take 1 capsule (4 mg) by mouth 3 times a day. 40 capsule 2     No current facility-administered medications on file prior to visit.         Performance Status:  Asymptomatic     Vitals and Measurements:   There were no vitals taken for this visit.   Telephone visit      Physical Exam:   Physical Exam   Telephone visit       Lab Results:  I have reviewed these laboratory results:     Hospital Outpatient Visit on 12/28/2023   Component Date Value Ref Range Status    Treatment Site 12/28/2023 Hip_R   Final    Course Number 12/28/2023 1   Final    Prescribed Fractional Dose 12/28/2023 200  cGray Final    Prescribed Total  Dose 12/28/2023 5,000  cGray Final    Actual Fractions Delivered 12/28/2023 8   Final    Prescription Pattern Comment 12/28/2023 Extend CBCT ensure all CTV in field   Final    Actual Session Delivered Dose 12/28/2023 200  cGray Final    Actual Total Dose 12/28/2023 1,600  cGray Final    Prescribed Technique 12/28/2023 VMAT   Final    Elapsed Days 12/28/2023 10   Final    Start Date 12/28/2023 12/18/2023   Final    Last Date 12/28/2023 12/28/2023   Final    Prescribed Number of Fractions 12/28/2023 25   Final   Hospital Outpatient Visit on 12/27/2023   Component Date Value Ref Range Status    Treatment Site 12/27/2023 Hip_R   Final    Course Number 12/27/2023 1   Final    Prescribed Fractional Dose 12/27/2023 200  cGray Final    Prescribed Total Dose 12/27/2023 5,000  cGray Final    Actual Fractions Delivered 12/27/2023 7   Final    Prescription Pattern Comment 12/27/2023 Extend CBCT ensure all CTV in field   Final    Actual Session Delivered Dose 12/27/2023 200  cGray Final    Actual Total Dose 12/27/2023 1,400  cGray Final    Prescribed Technique 12/27/2023 VMAT   Final    Elapsed Days 12/27/2023 9   Final    Start Date 12/27/2023 12/18/2023   Final    Last Date 12/27/2023 12/27/2023   Final    Prescribed Number of Fractions 12/27/2023 25   Final   Hospital Outpatient Visit on 12/26/2023   Component Date Value Ref Range Status    Treatment Site 12/26/2023 Hip_R   Final    Course Number 12/26/2023 1   Final    Prescribed Fractional Dose 12/26/2023 200  cGray Final    Prescribed Total Dose 12/26/2023 5,000  cGray Final    Actual Fractions Delivered 12/26/2023 6   Final    Prescription Pattern Comment 12/26/2023 Extend CBCT ensure all CTV in field   Final    Actual Session Delivered Dose 12/26/2023 200  cGray Final    Actual Total Dose 12/26/2023 1,200  cGray Final    Prescribed Technique 12/26/2023 VMAT   Final    Elapsed Days 12/26/2023 8   Final    Start Date 12/26/2023 12/18/2023   Final    Last Date 12/26/2023  12/26/2023   Final    Prescribed Number of Fractions 12/26/2023 25   Final   Lab on 12/26/2023   Component Date Value Ref Range Status    Prostate Specific Antigen,Screen 12/26/2023 1.00  <=4.00 ng/mL Final    WBC 12/26/2023 6.5  4.4 - 11.3 x10*3/uL Final    nRBC 12/26/2023 0.0  0.0 - 0.0 /100 WBCs Final    RBC 12/26/2023 5.70  4.50 - 5.90 x10*6/uL Final    Hemoglobin 12/26/2023 15.3  13.5 - 17.5 g/dL Final    Hematocrit 12/26/2023 46.5  41.0 - 52.0 % Final    MCV 12/26/2023 82  80 - 100 fL Final    MCH 12/26/2023 26.8  26.0 - 34.0 pg Final    MCHC 12/26/2023 32.9  32.0 - 36.0 g/dL Final    RDW 12/26/2023 12.1  11.5 - 14.5 % Final    Platelets 12/26/2023 227  150 - 450 x10*3/uL Final    Neutrophils % 12/26/2023 66.1  40.0 - 80.0 % Final    Immature Granulocytes %, Automated 12/26/2023 0.3  0.0 - 0.9 % Final    Lymphocytes % 12/26/2023 23.1  13.0 - 44.0 % Final    Monocytes % 12/26/2023 7.2  2.0 - 10.0 % Final    Eosinophils % 12/26/2023 2.5  0.0 - 6.0 % Final    Basophils % 12/26/2023 0.8  0.0 - 2.0 % Final    Neutrophils Absolute 12/26/2023 4.29  1.20 - 7.70 x10*3/uL Final    Immature Granulocytes Absolute, Au* 12/26/2023 0.02  0.00 - 0.70 x10*3/uL Final    Lymphocytes Absolute 12/26/2023 1.50  1.20 - 4.80 x10*3/uL Final    Monocytes Absolute 12/26/2023 0.47  0.10 - 1.00 x10*3/uL Final    Eosinophils Absolute 12/26/2023 0.16  0.00 - 0.70 x10*3/uL Final    Basophils Absolute 12/26/2023 0.05  0.00 - 0.10 x10*3/uL Final    Glucose 12/26/2023 145 (H)  74 - 99 mg/dL Final    Sodium 12/26/2023 144  136 - 145 mmol/L Final    Potassium 12/26/2023 4.0  3.5 - 5.3 mmol/L Final    Chloride 12/26/2023 107  98 - 107 mmol/L Final    Bicarbonate 12/26/2023 26  21 - 32 mmol/L Final    Anion Gap 12/26/2023 15  10 - 20 mmol/L Final    Urea Nitrogen 12/26/2023 17  6 - 23 mg/dL Final    Creatinine 12/26/2023 0.95  0.50 - 1.30 mg/dL Final    eGFR 12/26/2023 90  >60 mL/min/1.73m*2 Final    Calcium 12/26/2023 10.2  8.6 - 10.6 mg/dL Final     Albumin 12/26/2023 4.5  3.4 - 5.0 g/dL Final    Alkaline Phosphatase 12/26/2023 92  33 - 136 U/L Final    Total Protein 12/26/2023 6.9  6.4 - 8.2 g/dL Final    AST 12/26/2023 17  9 - 39 U/L Final    Bilirubin, Total 12/26/2023 0.8  0.0 - 1.2 mg/dL Final    ALT 12/26/2023 25  10 - 52 U/L Final    LDH 12/26/2023 132  84 - 246 U/L Final    Color, Urine 12/26/2023 Bernadette (N)  Straw, Yellow Final    Appearance, Urine 12/26/2023 Hazy (N)  Clear Final    Specific Gravity, Urine 12/26/2023 1.024  1.005 - 1.035 Final    pH, Urine 12/26/2023 5.0  5.0, 5.5, 6.0, 6.5, 7.0, 7.5, 8.0 Final    Protein, Urine 12/26/2023 NEGATIVE  NEGATIVE mg/dL Final    Glucose, Urine 12/26/2023 50 (1+) (A)  NEGATIVE mg/dL Final    Blood, Urine 12/26/2023 NEGATIVE  NEGATIVE Final    Ketones, Urine 12/26/2023 5 (TRACE) (A)  NEGATIVE mg/dL Final    Bilirubin, Urine 12/26/2023 NEGATIVE  NEGATIVE Final    Urobilinogen, Urine 12/26/2023 4.0 (N)  <2.0 mg/dL Final    Nitrite, Urine 12/26/2023 NEGATIVE  NEGATIVE Final    Leukocyte Esterase, Urine 12/26/2023 NEGATIVE  NEGATIVE Final    Thyroid Stimulating Hormone 12/26/2023 2.22  0.44 - 3.98 mIU/L Final   Hospital Outpatient Visit on 12/22/2023   Component Date Value Ref Range Status    Treatment Site 12/22/2023 Hip_R   Final    Course Number 12/22/2023 1   Final    Prescribed Fractional Dose 12/22/2023 200  cGray Final    Prescribed Total Dose 12/22/2023 5,000  cGray Final    Actual Fractions Delivered 12/22/2023 5   Final    Prescription Pattern Comment 12/22/2023 Extend CBCT ensure all CTV in field   Final    Actual Session Delivered Dose 12/22/2023 200  cGray Final    Actual Total Dose 12/22/2023 1,000  cGray Final    Prescribed Technique 12/22/2023 VMAT   Final    Elapsed Days 12/22/2023 4   Final    Start Date 12/22/2023 12/18/2023   Final    Last Date 12/22/2023 12/22/2023   Final    Prescribed Number of Fractions 12/22/2023 25   Final   Hospital Outpatient Visit on 12/21/2023   Component Date  Value Ref Range Status    Treatment Site 12/21/2023 Hip_R   Final    Course Number 12/21/2023 1   Final    Prescribed Fractional Dose 12/21/2023 200  cGray Final    Prescribed Total Dose 12/21/2023 5,000  cGray Final    Actual Fractions Delivered 12/21/2023 4   Final    Prescription Pattern Comment 12/21/2023 Extend CBCT ensure all CTV in field   Final    Actual Session Delivered Dose 12/21/2023 200  cGray Final    Actual Total Dose 12/21/2023 800  cGray Final    Prescribed Technique 12/21/2023 VMAT   Final    Elapsed Days 12/21/2023 3   Final    Start Date 12/21/2023 12/18/2023   Final    Last Date 12/21/2023 12/21/2023   Final    Prescribed Number of Fractions 12/21/2023 25   Final   Hospital Outpatient Visit on 12/20/2023   Component Date Value Ref Range Status    Treatment Site 12/20/2023 Hip_R   Final    Course Number 12/20/2023 1   Final    Prescribed Fractional Dose 12/20/2023 200  cGray Final    Prescribed Total Dose 12/20/2023 5,000  cGray Final    Actual Fractions Delivered 12/20/2023 3   Final    Prescription Pattern Comment 12/20/2023 Extend CBCT ensure all CTV in field   Final    Actual Session Delivered Dose 12/20/2023 200  cGray Final    Actual Total Dose 12/20/2023 600  cGray Final    Prescribed Technique 12/20/2023 VMAT   Final    Elapsed Days 12/20/2023 2   Final    Start Date 12/20/2023 12/18/2023   Final    Last Date 12/20/2023 12/20/2023   Final    Prescribed Number of Fractions 12/20/2023 25   Final   Hospital Outpatient Visit on 12/19/2023   Component Date Value Ref Range Status    Treatment Site 12/19/2023 Hip_R   Final    Course Number 12/19/2023 1   Final    Prescribed Fractional Dose 12/19/2023 200  cGray Final    Prescribed Total Dose 12/19/2023 5,000  cGray Final    Actual Fractions Delivered 12/19/2023 2   Final    Actual Session Delivered Dose 12/19/2023 200  cGray Final    Actual Total Dose 12/19/2023 400  cGray Final    Prescribed Technique 12/19/2023 VMAT   Final    Elapsed Days  12/19/2023 1   Final    Start Date 12/19/2023 12/18/2023   Final    Last Date 12/19/2023 12/19/2023   Final    Prescribed Number of Fractions 12/19/2023 25   Final   Hospital Outpatient Visit on 12/18/2023   Component Date Value Ref Range Status    Treatment Site 12/18/2023 Hip_R   Final    Course Number 12/18/2023 1   Final    Prescribed Fractional Dose 12/18/2023 200  cGray Final    Prescribed Total Dose 12/18/2023 5,000  cGray Final    Actual Fractions Delivered 12/18/2023 1   Final    Actual Session Delivered Dose 12/18/2023 200  cGray Final    Actual Total Dose 12/18/2023 200  cGray Final    Prescribed Technique 12/18/2023 VMAT   Final    Elapsed Days 12/18/2023 0   Final    Start Date 12/18/2023 12/18/2023   Final    Last Date 12/18/2023 12/18/2023   Final    Prescribed Number of Fractions 12/18/2023 25   Final   Lab on 12/18/2023   Component Date Value Ref Range Status    Thyroid Stimulating Hormone 12/18/2023 2.85  0.44 - 3.98 mIU/L Final    WBC 12/18/2023 7.3  4.4 - 11.3 x10*3/uL Final    nRBC 12/18/2023 0.0  0.0 - 0.0 /100 WBCs Final    RBC 12/18/2023 5.94 (H)  4.50 - 5.90 x10*6/uL Final    Hemoglobin 12/18/2023 15.8  13.5 - 17.5 g/dL Final    Hematocrit 12/18/2023 46.9  41.0 - 52.0 % Final    MCV 12/18/2023 79 (L)  80 - 100 fL Final    MCH 12/18/2023 26.6  26.0 - 34.0 pg Final    MCHC 12/18/2023 33.7  32.0 - 36.0 g/dL Final    RDW 12/18/2023 11.9  11.5 - 14.5 % Final    Platelets 12/18/2023 229  150 - 450 x10*3/uL Final    Neutrophils % 12/18/2023 64.1  40.0 - 80.0 % Final    Immature Granulocytes %, Automated 12/18/2023 0.3  0.0 - 0.9 % Final    Lymphocytes % 12/18/2023 28.2  13.0 - 44.0 % Final    Monocytes % 12/18/2023 6.6  2.0 - 10.0 % Final    Eosinophils % 12/18/2023 0.3  0.0 - 6.0 % Final    Basophils % 12/18/2023 0.5  0.0 - 2.0 % Final    Neutrophils Absolute 12/18/2023 4.68  1.20 - 7.70 x10*3/uL Final    Immature Granulocytes Absolute, Au* 12/18/2023 0.02  0.00 - 0.70 x10*3/uL Final     Lymphocytes Absolute 12/18/2023 2.06  1.20 - 4.80 x10*3/uL Final    Monocytes Absolute 12/18/2023 0.48  0.10 - 1.00 x10*3/uL Final    Eosinophils Absolute 12/18/2023 0.02  0.00 - 0.70 x10*3/uL Final    Basophils Absolute 12/18/2023 0.04  0.00 - 0.10 x10*3/uL Final    Glucose 12/18/2023 126 (H)  74 - 99 mg/dL Final    Sodium 12/18/2023 142  136 - 145 mmol/L Final    Potassium 12/18/2023 4.0  3.5 - 5.3 mmol/L Final    Chloride 12/18/2023 106  98 - 107 mmol/L Final    Bicarbonate 12/18/2023 24  21 - 32 mmol/L Final    Anion Gap 12/18/2023 16  10 - 20 mmol/L Final    Urea Nitrogen 12/18/2023 13  6 - 23 mg/dL Final    Creatinine 12/18/2023 0.92  0.50 - 1.30 mg/dL Final    eGFR 12/18/2023 >90  >60 mL/min/1.73m*2 Final    Calcium 12/18/2023 9.7  8.6 - 10.3 mg/dL Final    Albumin 12/18/2023 4.6  3.4 - 5.0 g/dL Final    Alkaline Phosphatase 12/18/2023 80  33 - 136 U/L Final    Total Protein 12/18/2023 7.6  6.4 - 8.2 g/dL Final    AST 12/18/2023 24  9 - 39 U/L Final    Bilirubin, Total 12/18/2023 1.0  0.0 - 1.2 mg/dL Final    ALT 12/18/2023 32  10 - 52 U/L Final    LDH 12/18/2023 136  84 - 246 U/L Final    Color, Urine 12/18/2023 Yellow  Straw, Yellow Final    Appearance, Urine 12/18/2023 Clear  Clear Final    Specific Gravity, Urine 12/18/2023 1.024  1.005 - 1.035 Final    pH, Urine 12/18/2023 5.0  5.0, 5.5, 6.0, 6.5, 7.0, 7.5, 8.0 Final    Protein, Urine 12/18/2023 NEGATIVE  NEGATIVE mg/dL Final    Glucose, Urine 12/18/2023 NEGATIVE  NEGATIVE mg/dL Final    Blood, Urine 12/18/2023 NEGATIVE  NEGATIVE Final    Ketones, Urine 12/18/2023 NEGATIVE  NEGATIVE mg/dL Final    Bilirubin, Urine 12/18/2023 NEGATIVE  NEGATIVE Final    Urobilinogen, Urine 12/18/2023 <2.0  <2.0 mg/dL Final    Nitrite, Urine 12/18/2023 NEGATIVE  NEGATIVE Final    Leukocyte Esterase, Urine 12/18/2023 NEGATIVE  NEGATIVE Final   There may be more visits with results that are not included.         Radiology Result:  I have reviewed the latest Imaging in PACS  and the findings are noted in this note. I discussed the results of the latest imaging with the patient. All previous imaging were reviewed at the time it was completed. Full records are available in the EMR for review as well.     CT CHEST WO IV CONTRAST;  11/17/2023 11:32 am  IMPRESSION:  1.  No evidence of intrathoracic metastatic disease    ==========================================================    MRI of the pelvis with and without IV contrast;  9/29/2023 10:23 am   IMPRESSION:  Complex, heterogeneously enhancing, partially necrotic intramuscular  right gluteus una mass. Imaging characteristics are aggressive  with underlying malignant lesion high in the differential. Recommend  orthopedic oncology consultation and image guided tissue sampling for  further evaluation.     Pathology Results:  I have reviewed the full pathology report recorded in the EMR. The pertinent portions indicating diagnosis are listed here in the note. for details please refer to the full report recorded in the EMR.    Collected 11/13/2023 09:57    Status: Final result    Right gluteal mass, biopsy:  -- Extraskeletal myxoid chondrosarcoma; see note and synoptic report.  -- Immunohistochemical stains performed on formalin-fixed, paraffin embedded sections demonstrate neoplastic cells to be positive for S100 and INI1 (retained nuclear expression), and negative for EMA, AE1/AE3 and synaptophysin.     Note: Sarcoma fusion NGS panel performed at [facility] shows the presence of a [id number] gene fusion, supporting the above diagnosis.     Assessment and Plan:   Assessment/Plan   Mr. Jason Hollins is a 63 y.o. male with a diagnosis of localized sarcoma. Please see the evolution of the case listed above in the cancer history.     Mr. Hollins started RT on 12/18/23. He will increase Pazopanib to 800mg PO daily on 12/20/23. He also had elevation in the blood pressure to a maximum of 150/91 mmHg. He was started on Nifedipine ER 30mg PO  daily on 12/19/23. Blood pressures have been 120/80's (one diastolic reading at 92) this last week     Plan:  - Continue Pazopanib start at 800mg po daily.  - Blood test on 01/02/2024   - RTC as phone visit on 01/04/2024.  - Continue Nifedipine ER 30mg PO daily.   - Continue to check BP at home and keep a log of the readings.     DISCLAIMER:   In preparing for this visit and writing this note, I reviewed all the previous electronic medical records (labs, imaging and medical charts) of the patient available in the physician portal. Significant findings which helped in decision making are recorded  in this chart.     The plan was discussed with the patient. We gave him ample opportunities to ask questions. All questions were answered to his satisfaction and he verbalized understanding.      INSTRUCTIONS FOR PATIENT  Mr. Jason Hollins ,  It was a pleasure talking to you today.    As discussed, please continue Pazopanib 800mg by mouth daily. Please also continue Nifedipine at 30mg by mouth daily and check your blood pressure daily. Please have labs 01/02/2024. I will call you 01/04/2024.    Instructions to take Pazopanib:  - Take 4 pills together (1 pill at a time). Do not crush the pill. Swallow as a whole tablet.   - Take 1 hour prior to meal or 2 hours after meal.   - Please do not drink or eat Grapefruit, [place] oranges, Pomelo and Star fruit (and their juice), while you are on this medication.  - If you are taking Pantoprazole, Omeprazole, Lansoprazole or Esmoprazole, then please stop these medicines while you are on Pazopanib.   - Monitor your blood pressure at home. If systolic BP goes above 150mmHg or diastolic BP goes above 94mmHg, then give us a call.   - Every physician/nurse, especially surgeons should know that you are on Pazopanib. Surgeries are CONTRAINDICATED when you are taking pazopanib. Please let your surgeon talk to us before they plan surgery for you.    Please make sure to schedule your appointments  as we discussed. Your appointments should also appear in your MyChart.   In case of an emergency please dial 911 or report to your nearest Emergency Room.  For all other questions, please do not hesitate to reach out to us at the number listed below.    Thank you for choosing [facility] at [facility].   We appreciate your visit.     MD Siomara Hernandez, PHILLIP Hoyt RN ([facility] location)  Maribel Jaffe RN ([place] location)    Sarcoma and Cutaneous Oncology team    Phone: [phone]  Fax: [phone].

## 2023-12-29 ENCOUNTER — HOSPITAL ENCOUNTER (OUTPATIENT)
Dept: RADIATION ONCOLOGY | Facility: CLINIC | Age: 63
Setting detail: RADIATION/ONCOLOGY SERIES
Discharge: HOME | End: 2023-12-29
Payer: COMMERCIAL

## 2023-12-29 DIAGNOSIS — C49.21 MALIGNANT NEOPLASM OF CONNECTIVE AND SOFT TISSUE OF RIGHT LOWER LIMB, INCLUDING HIP (MULTI): ICD-10-CM

## 2023-12-29 DIAGNOSIS — Z51.0 ENCOUNTER FOR ANTINEOPLASTIC RADIATION THERAPY: ICD-10-CM

## 2023-12-29 LAB
RAD ONC MSQ ACTUAL FRACTIONS DELIVERED: 9
RAD ONC MSQ ACTUAL SESSION DELIVERED DOSE: 200 CGRAY
RAD ONC MSQ ACTUAL TOTAL DOSE: 1800 CGRAY
RAD ONC MSQ ELAPSED DAYS: 11
RAD ONC MSQ LAST DATE: NORMAL
RAD ONC MSQ PRESCRIBED FRACTIONAL DOSE: 200 CGRAY
RAD ONC MSQ PRESCRIBED NUMBER OF FRACTIONS: 25
RAD ONC MSQ PRESCRIBED TECHNIQUE: NORMAL
RAD ONC MSQ PRESCRIBED TOTAL DOSE: 5000 CGRAY
RAD ONC MSQ PRESCRIPTION PATTERN COMMENT: NORMAL
RAD ONC MSQ START DATE: NORMAL
RAD ONC MSQ TREATMENT COURSE NUMBER: 1
RAD ONC MSQ TREATMENT SITE: NORMAL

## 2023-12-29 PROCEDURE — 77014 CHG CT GUIDANCE RADIATION THERAPY FLDS PLACEMENT: CPT | Performed by: STUDENT IN AN ORGANIZED HEALTH CARE EDUCATION/TRAINING PROGRAM

## 2023-12-29 PROCEDURE — 77386 HC INTENSITY-MODULATED RADIATION THERAPY (IMRT), COMPLEX: CPT | Performed by: STUDENT IN AN ORGANIZED HEALTH CARE EDUCATION/TRAINING PROGRAM

## 2024-01-02 ENCOUNTER — HOSPITAL ENCOUNTER (OUTPATIENT)
Dept: RADIATION ONCOLOGY | Facility: CLINIC | Age: 64
Setting detail: RADIATION/ONCOLOGY SERIES
Discharge: HOME | End: 2024-01-02
Payer: COMMERCIAL

## 2024-01-02 ENCOUNTER — LAB (OUTPATIENT)
Dept: LAB | Facility: CLINIC | Age: 64
End: 2024-01-02
Payer: COMMERCIAL

## 2024-01-02 DIAGNOSIS — Z51.0 ENCOUNTER FOR ANTINEOPLASTIC RADIATION THERAPY: ICD-10-CM

## 2024-01-02 DIAGNOSIS — Z51.81 ENCOUNTER FOR MONITORING CARDIOTOXIC DRUG THERAPY: ICD-10-CM

## 2024-01-02 DIAGNOSIS — Z79.899 ENCOUNTER FOR MONITORING CARDIOTOXIC DRUG THERAPY: ICD-10-CM

## 2024-01-02 DIAGNOSIS — C49.9 SOFT TISSUE SARCOMA (MULTI): ICD-10-CM

## 2024-01-02 DIAGNOSIS — R53.0 NEOPLASTIC MALIGNANT RELATED FATIGUE: ICD-10-CM

## 2024-01-02 DIAGNOSIS — C49.21 MALIGNANT NEOPLASM OF CONNECTIVE AND SOFT TISSUE OF RIGHT LOWER LIMB, INCLUDING HIP (MULTI): ICD-10-CM

## 2024-01-02 LAB
ALBUMIN SERPL BCP-MCNC: 4.3 G/DL (ref 3.4–5)
ALP SERPL-CCNC: 79 U/L (ref 33–136)
ALT SERPL W P-5'-P-CCNC: 49 U/L (ref 10–52)
ANION GAP SERPL CALC-SCNC: 18 MMOL/L (ref 10–20)
APPEARANCE UR: ABNORMAL
AST SERPL W P-5'-P-CCNC: 39 U/L (ref 9–39)
BASOPHILS # BLD AUTO: 0.05 X10*3/UL (ref 0–0.1)
BASOPHILS NFR BLD AUTO: 1.1 %
BILIRUB SERPL-MCNC: 0.9 MG/DL (ref 0–1.2)
BILIRUB UR STRIP.AUTO-MCNC: NEGATIVE MG/DL
BUN SERPL-MCNC: 18 MG/DL (ref 6–23)
CALCIUM SERPL-MCNC: 9 MG/DL (ref 8.6–10.3)
CHLORIDE SERPL-SCNC: 106 MMOL/L (ref 98–107)
CO2 SERPL-SCNC: 22 MMOL/L (ref 21–32)
COLOR UR: ABNORMAL
CREAT SERPL-MCNC: 1.1 MG/DL (ref 0.5–1.3)
EOSINOPHIL # BLD AUTO: 0.33 X10*3/UL (ref 0–0.7)
EOSINOPHIL NFR BLD AUTO: 7 %
ERYTHROCYTE [DISTWIDTH] IN BLOOD BY AUTOMATED COUNT: 12.6 % (ref 11.5–14.5)
GFR SERPL CREATININE-BSD FRML MDRD: 76 ML/MIN/1.73M*2
GLUCOSE SERPL-MCNC: 177 MG/DL (ref 74–99)
GLUCOSE UR STRIP.AUTO-MCNC: ABNORMAL MG/DL
HCT VFR BLD AUTO: 46.9 % (ref 41–52)
HGB BLD-MCNC: 15.9 G/DL (ref 13.5–17.5)
HYALINE CASTS #/AREA URNS AUTO: ABNORMAL /LPF
IMM GRANULOCYTES # BLD AUTO: 0.01 X10*3/UL (ref 0–0.7)
IMM GRANULOCYTES NFR BLD AUTO: 0.2 % (ref 0–0.9)
KETONES UR STRIP.AUTO-MCNC: ABNORMAL MG/DL
LDH SERPL L TO P-CCNC: 161 U/L (ref 84–246)
LEUKOCYTE ESTERASE UR QL STRIP.AUTO: NEGATIVE
LYMPHOCYTES # BLD AUTO: 0.71 X10*3/UL (ref 1.2–4.8)
LYMPHOCYTES NFR BLD AUTO: 15.1 %
MCH RBC QN AUTO: 26.5 PG (ref 26–34)
MCHC RBC AUTO-ENTMCNC: 33.9 G/DL (ref 32–36)
MCV RBC AUTO: 78 FL (ref 80–100)
MONOCYTES # BLD AUTO: 0.48 X10*3/UL (ref 0.1–1)
MONOCYTES NFR BLD AUTO: 10.2 %
MUCOUS THREADS #/AREA URNS AUTO: ABNORMAL /LPF
NEUTROPHILS # BLD AUTO: 3.11 X10*3/UL (ref 1.2–7.7)
NEUTROPHILS NFR BLD AUTO: 66.4 %
NITRITE UR QL STRIP.AUTO: NEGATIVE
NRBC BLD-RTO: 0 /100 WBCS (ref 0–0)
PH UR STRIP.AUTO: 5 [PH]
PLATELET # BLD AUTO: 171 X10*3/UL (ref 150–450)
POTASSIUM SERPL-SCNC: 3.7 MMOL/L (ref 3.5–5.3)
PROT SERPL-MCNC: 7.4 G/DL (ref 6.4–8.2)
PROT UR STRIP.AUTO-MCNC: ABNORMAL MG/DL
RAD ONC MSQ ACTUAL FRACTIONS DELIVERED: 10
RAD ONC MSQ ACTUAL SESSION DELIVERED DOSE: 200 CGRAY
RAD ONC MSQ ACTUAL TOTAL DOSE: 2000 CGRAY
RAD ONC MSQ ELAPSED DAYS: 15
RAD ONC MSQ LAST DATE: NORMAL
RAD ONC MSQ PRESCRIBED FRACTIONAL DOSE: 200 CGRAY
RAD ONC MSQ PRESCRIBED NUMBER OF FRACTIONS: 25
RAD ONC MSQ PRESCRIBED TECHNIQUE: NORMAL
RAD ONC MSQ PRESCRIBED TOTAL DOSE: 5000 CGRAY
RAD ONC MSQ PRESCRIPTION PATTERN COMMENT: NORMAL
RAD ONC MSQ START DATE: NORMAL
RAD ONC MSQ TREATMENT COURSE NUMBER: 1
RAD ONC MSQ TREATMENT SITE: NORMAL
RBC # BLD AUTO: 6 X10*6/UL (ref 4.5–5.9)
RBC # UR STRIP.AUTO: NEGATIVE /UL
RBC #/AREA URNS AUTO: ABNORMAL /HPF
SODIUM SERPL-SCNC: 142 MMOL/L (ref 136–145)
SP GR UR STRIP.AUTO: 1.03
TSH SERPL-ACNC: 3.6 MIU/L (ref 0.44–3.98)
UROBILINOGEN UR STRIP.AUTO-MCNC: 2 MG/DL
WBC # BLD AUTO: 4.7 X10*3/UL (ref 4.4–11.3)
WBC #/AREA URNS AUTO: ABNORMAL /HPF

## 2024-01-02 PROCEDURE — 77014 CHG CT GUIDANCE RADIATION THERAPY FLDS PLACEMENT: CPT | Performed by: STUDENT IN AN ORGANIZED HEALTH CARE EDUCATION/TRAINING PROGRAM

## 2024-01-02 PROCEDURE — 83615 LACTATE (LD) (LDH) ENZYME: CPT

## 2024-01-02 PROCEDURE — 81001 URINALYSIS AUTO W/SCOPE: CPT

## 2024-01-02 PROCEDURE — 85025 COMPLETE CBC W/AUTO DIFF WBC: CPT

## 2024-01-02 PROCEDURE — 36415 COLL VENOUS BLD VENIPUNCTURE: CPT

## 2024-01-02 PROCEDURE — 77386 HC INTENSITY-MODULATED RADIATION THERAPY (IMRT), COMPLEX: CPT | Performed by: STUDENT IN AN ORGANIZED HEALTH CARE EDUCATION/TRAINING PROGRAM

## 2024-01-02 PROCEDURE — 80053 COMPREHEN METABOLIC PANEL: CPT

## 2024-01-02 PROCEDURE — 84443 ASSAY THYROID STIM HORMONE: CPT

## 2024-01-03 ENCOUNTER — RADIATION ONCOLOGY OTV (OUTPATIENT)
Dept: RADIATION ONCOLOGY | Facility: CLINIC | Age: 64
End: 2024-01-03
Payer: COMMERCIAL

## 2024-01-03 ENCOUNTER — HOSPITAL ENCOUNTER (OUTPATIENT)
Dept: RADIATION ONCOLOGY | Facility: CLINIC | Age: 64
Setting detail: RADIATION/ONCOLOGY SERIES
Discharge: HOME | End: 2024-01-03
Payer: COMMERCIAL

## 2024-01-03 VITALS
SYSTOLIC BLOOD PRESSURE: 136 MMHG | HEART RATE: 99 BPM | OXYGEN SATURATION: 96 % | BODY MASS INDEX: 37.74 KG/M2 | TEMPERATURE: 97.3 F | RESPIRATION RATE: 18 BRPM | DIASTOLIC BLOOD PRESSURE: 88 MMHG | WEIGHT: 263.01 LBS

## 2024-01-03 DIAGNOSIS — Z51.0 ENCOUNTER FOR ANTINEOPLASTIC RADIATION THERAPY: ICD-10-CM

## 2024-01-03 DIAGNOSIS — C49.21 MALIGNANT NEOPLASM OF CONNECTIVE AND SOFT TISSUE OF RIGHT LOWER LIMB, INCLUDING HIP (MULTI): ICD-10-CM

## 2024-01-03 LAB
RAD ONC MSQ ACTUAL FRACTIONS DELIVERED: 11
RAD ONC MSQ ACTUAL SESSION DELIVERED DOSE: 200 CGRAY
RAD ONC MSQ ACTUAL TOTAL DOSE: 2200 CGRAY
RAD ONC MSQ ELAPSED DAYS: 16
RAD ONC MSQ LAST DATE: NORMAL
RAD ONC MSQ PRESCRIBED FRACTIONAL DOSE: 200 CGRAY
RAD ONC MSQ PRESCRIBED NUMBER OF FRACTIONS: 25
RAD ONC MSQ PRESCRIBED TECHNIQUE: NORMAL
RAD ONC MSQ PRESCRIBED TOTAL DOSE: 5000 CGRAY
RAD ONC MSQ PRESCRIPTION PATTERN COMMENT: NORMAL
RAD ONC MSQ START DATE: NORMAL
RAD ONC MSQ TREATMENT COURSE NUMBER: 1
RAD ONC MSQ TREATMENT SITE: NORMAL

## 2024-01-03 PROCEDURE — 77427 RADIATION TX MANAGEMENT X5: CPT | Performed by: STUDENT IN AN ORGANIZED HEALTH CARE EDUCATION/TRAINING PROGRAM

## 2024-01-03 PROCEDURE — 77386 HC INTENSITY-MODULATED RADIATION THERAPY (IMRT), COMPLEX: CPT | Performed by: STUDENT IN AN ORGANIZED HEALTH CARE EDUCATION/TRAINING PROGRAM

## 2024-01-03 PROCEDURE — 77014 CHG CT GUIDANCE RADIATION THERAPY FLDS PLACEMENT: CPT | Performed by: STUDENT IN AN ORGANIZED HEALTH CARE EDUCATION/TRAINING PROGRAM

## 2024-01-03 ASSESSMENT — PAIN SCALES - GENERAL: PAINLEVEL: 0-NO PAIN

## 2024-01-03 NOTE — PROGRESS NOTES
Radiation Oncology On Treatment Visit    Patient Name:  Jason Hollins  MRN:  [id number]  :  1960    Referring Provider: Mikal Hawkins MD  Primary Care Provider: RAQUEL Gregory  Care Team: Patient Care Team:  RAQUEL Gregory as PCP - General  Chiki Carbajal MD as Consulting Physician (Hematology and Oncology)  Mikal Hawkins MD as Consulting Physician (Orthopaedic Surgery)  Desmond Humphries MD as Radiation Oncologist (Radiation Oncology)    Date of Service: 1/3/2024     Diagnosis:   Specialty Problems          Radiation Oncology Problems    Soft tissue sarcoma (CMS/HCC)         Treatment Summary:  Radiation Treatments       Active   Hip_R (Started on 2023)   Most recent fraction: 200 cGy given on 1/3/2024   Total given: 2,200 cGy / 5,000 cGy  (11 of 25 fractions)   Elapsed Days: 16   Technique: VMAT           Completed   No historical radiation treatments to show.             SUBJECTIVE: The patient notes fatigue.  The patient has 1-2 episodes of diarrhea.  No current issues with pain.    OBJECTIVE:   Vital Signs:  /88   Pulse 99   Temp 36.3 °C (97.3 °F)   Resp 18   Wt 119 kg (263 lb 0.1 oz)   SpO2 96%   BMI 37.74 kg/m²    Pain Scale: The patient's current pain level was assessed.  They report currently having a pain of 0 out of 10.    Other Pertinent Findings:     Toxicity Assessment          2023    07:55 2023    07:34 1/3/2024    07:28   Toxicity Assessment   Treatment Site General General General   Anorexia Grade 0 Grade 0 Grade 0   Anxiety Grade 0 Grade 0 Grade 0   Dehydration Grade 0 Grade 0 Grade 0   Depression Grade 0 Grade 0 Grade 0   Dermatitis Radiation Grade 0 Grade 0 Grade 0   Diarrhea Grade 1 Grade 1       last couple days 1-2 times Grade 1   Fatigue Grade 0 Grade 0 Grade 1   Fibrosis Deep Connective Tissue Grade 0 Grade 0 Grade 0   Fracture Grade 0 Grade 0 Grade 0   Nausea Grade 0 Grade 0 Grade 0   Pain Grade 1 Grade 1 Grade 0   Treatment Related  Secondary Malignancy Grade 0 Grade 0 Grade 0   Tumor Pain Grade 0 Grade 0 Grade 0   Vomiting Grade 0 Grade 0 Grade 0   Alopecia Grade 0 Grade 0 Grade 0   Erythroderma Grade 0 Grade 0 Grade 0   Pain of Skin Grade 0 Grade 0 Grade 0   Pruritus Grade 0 Grade 0 Grade 0   Rash Acneiform Grade 0 Grade 0 Grade 0   Skin Hyperpigmentation Grade 0 Grade 0 Grade 0   Skin Hypopigmentation Grade 0 Grade 0 Grade 0   Skin Induration Grade 0 Grade 0 Grade 0   Skin Ulceration Grade 0 Grade 0 Grade 0   Telangiectasia Grade 0 Grade 0 Grade 0        Concurrent systemic therapy: Pazopanib    Labs:   WBC   Date Value Ref Range Status   01/02/2024 4.7 4.4 - 11.3 x10*3/uL Final   12/26/2023 6.5 4.4 - 11.3 x10*3/uL Final     Hemoglobin   Date Value Ref Range Status   01/02/2024 15.9 13.5 - 17.5 g/dL Final   12/26/2023 15.3 13.5 - 17.5 g/dL Final     Hematocrit   Date Value Ref Range Status   01/02/2024 46.9 41.0 - 52.0 % Final   12/26/2023 46.5 41.0 - 52.0 % Final     Neutrophils Absolute   Date Value Ref Range Status   01/02/2024 3.11 1.20 - 7.70 x10*3/uL Final     Comment:     Percent differential counts (%) should be interpreted in the context of the absolute cell counts (cells/uL).   12/26/2023 4.29 1.20 - 7.70 x10*3/uL Final     Comment:     Percent differential counts (%) should be interpreted in the context of the absolute cell counts (cells/uL).     Platelets   Date Value Ref Range Status   01/02/2024 171 150 - 450 x10*3/uL Final   12/26/2023 227 150 - 450 x10*3/uL Final     Alkaline Phosphatase   Date Value Ref Range Status   01/02/2024 79 33 - 136 U/L Final   12/26/2023 92 33 - 136 U/L Final     AST   Date Value Ref Range Status   01/02/2024 39 9 - 39 U/L Final   12/26/2023 17 9 - 39 U/L Final     ALT   Date Value Ref Range Status   01/02/2024 49 10 - 52 U/L Final     Comment:     Patients treated with Sulfasalazine may generate falsely decreased results for ALT.   12/26/2023 25 10 - 52 U/L Final     Comment:     Patients treated  with Sulfasalazine may generate falsely decreased results for ALT.     Bilirubin, Total   Date Value Ref Range Status   01/02/2024 0.9 0.0 - 1.2 mg/dL Final   12/26/2023 0.8 0.0 - 1.2 mg/dL Final     Glucose   Date Value Ref Range Status   01/02/2024 177 (H) 74 - 99 mg/dL Final   12/26/2023 145 (H) 74 - 99 mg/dL Final     Calcium   Date Value Ref Range Status   01/02/2024 9.0 8.6 - 10.3 mg/dL Final   12/26/2023 10.2 8.6 - 10.6 mg/dL Final     Sodium   Date Value Ref Range Status   01/02/2024 142 136 - 145 mmol/L Final   12/26/2023 144 136 - 145 mmol/L Final     Potassium   Date Value Ref Range Status   01/02/2024 3.7 3.5 - 5.3 mmol/L Final   12/26/2023 4.0 3.5 - 5.3 mmol/L Final     Bicarbonate   Date Value Ref Range Status   01/02/2024 22 21 - 32 mmol/L Final   12/26/2023 26 21 - 32 mmol/L Final     Chloride   Date Value Ref Range Status   01/02/2024 106 98 - 107 mmol/L Final   12/26/2023 107 98 - 107 mmol/L Final     Urea Nitrogen   Date Value Ref Range Status   01/02/2024 18 6 - 23 mg/dL Final   12/26/2023 17 6 - 23 mg/dL Final     Creatinine   Date Value Ref Range Status   01/02/2024 1.10 0.50 - 1.30 mg/dL Final   12/26/2023 0.95 0.50 - 1.30 mg/dL Final         Exam: Resting comfortable in chair.     Assessment / Plan:  The patient is tolerating radiation therapy as anticipated.  Continue per current treatment plan.       Therapies: Recommended Imodium if needed for diarrhea.  Rest as needed for fatigue.  Imaging shows tumor response with edema but still within clinical target volume, will consider reCT and replan if edema treatment response continues to increase.      Side effects reviewed with patient. Images, chart and labs reviewed.    Joby Birch MD

## 2024-01-04 ENCOUNTER — HOSPITAL ENCOUNTER (OUTPATIENT)
Dept: RADIATION ONCOLOGY | Facility: CLINIC | Age: 64
Setting detail: RADIATION/ONCOLOGY SERIES
Discharge: HOME | End: 2024-01-04
Payer: COMMERCIAL

## 2024-01-04 ENCOUNTER — OFFICE VISIT (OUTPATIENT)
Dept: HEMATOLOGY/ONCOLOGY | Facility: CLINIC | Age: 64
End: 2024-01-04
Payer: COMMERCIAL

## 2024-01-04 DIAGNOSIS — C49.21 MALIGNANT NEOPLASM OF CONNECTIVE AND SOFT TISSUE OF RIGHT LOWER LIMB, INCLUDING HIP (MULTI): ICD-10-CM

## 2024-01-04 DIAGNOSIS — M79.18 BUTTOCK PAIN: ICD-10-CM

## 2024-01-04 DIAGNOSIS — Z51.0 ENCOUNTER FOR ANTINEOPLASTIC RADIATION THERAPY: ICD-10-CM

## 2024-01-04 DIAGNOSIS — M54.50 ACUTE RIGHT-SIDED LOW BACK PAIN WITHOUT SCIATICA: ICD-10-CM

## 2024-01-04 DIAGNOSIS — C49.9 SOFT TISSUE SARCOMA (MULTI): Primary | ICD-10-CM

## 2024-01-04 LAB
RAD ONC MSQ ACTUAL FRACTIONS DELIVERED: 12
RAD ONC MSQ ACTUAL SESSION DELIVERED DOSE: 200 CGRAY
RAD ONC MSQ ACTUAL TOTAL DOSE: 2400 CGRAY
RAD ONC MSQ ELAPSED DAYS: 17
RAD ONC MSQ LAST DATE: NORMAL
RAD ONC MSQ PRESCRIBED FRACTIONAL DOSE: 200 CGRAY
RAD ONC MSQ PRESCRIBED NUMBER OF FRACTIONS: 25
RAD ONC MSQ PRESCRIBED TECHNIQUE: NORMAL
RAD ONC MSQ PRESCRIBED TOTAL DOSE: 5000 CGRAY
RAD ONC MSQ PRESCRIPTION PATTERN COMMENT: NORMAL
RAD ONC MSQ START DATE: NORMAL
RAD ONC MSQ TREATMENT COURSE NUMBER: 1
RAD ONC MSQ TREATMENT SITE: NORMAL

## 2024-01-04 PROCEDURE — 77386 HC INTENSITY-MODULATED RADIATION THERAPY (IMRT), COMPLEX: CPT | Performed by: STUDENT IN AN ORGANIZED HEALTH CARE EDUCATION/TRAINING PROGRAM

## 2024-01-04 PROCEDURE — 1036F TOBACCO NON-USER: CPT | Performed by: NURSE PRACTITIONER

## 2024-01-04 PROCEDURE — 3008F BODY MASS INDEX DOCD: CPT | Performed by: NURSE PRACTITIONER

## 2024-01-04 PROCEDURE — 77014 CHG CT GUIDANCE RADIATION THERAPY FLDS PLACEMENT: CPT | Performed by: STUDENT IN AN ORGANIZED HEALTH CARE EDUCATION/TRAINING PROGRAM

## 2024-01-04 PROCEDURE — 99443 PR PHYS/QHP TELEPHONE EVALUATION 21-30 MIN: CPT | Performed by: NURSE PRACTITIONER

## 2024-01-04 ASSESSMENT — ENCOUNTER SYMPTOMS
RESPIRATORY NEGATIVE: 1
MUSCULOSKELETAL NEGATIVE: 1
HEMATOLOGIC/LYMPHATIC NEGATIVE: 1
ENDOCRINE NEGATIVE: 1
PSYCHIATRIC NEGATIVE: 1
NEUROLOGICAL NEGATIVE: 1
EYES NEGATIVE: 1
CONSTITUTIONAL NEGATIVE: 1
CARDIOVASCULAR NEGATIVE: 1
DIARRHEA: 1

## 2024-01-04 NOTE — PROGRESS NOTES
".Patient ID: Jason Hollins is a 63 y.o. male.  Referring Physician: No referring provider defined for this encounter.  Primary Care Provider: PHILLIP Gregory-CNP     Oncology follow up    HPI  Mr. Jason Hollins is referred by Dr. 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 He met with our team on 11/29/23.      Mr. Hollins first noticed a mass in the right buttock in June 2023. This was eventually investigated by his PCP and he was found to have a mass. MRI on 09/20/23 reported a 14.9 x 12.7 x 8.3 cm predominantly T2 hyperintense, T1 hypointense, heterogeneously enhancing, encapsulated intramuscular solid lesion in the right gluteus una muscle. He was referred to Dr. Mikal Hawkins, whom he met on 10/25/23. CT guided biopsy of the mass was pursued on 11/13/2023. The pathology slides were discussed in the tumor board and a diagnosis of sarcoma was rendered. He was referred to Radiation Oncology and Medical Oncology for planning pre-operative radiation and neoadjuvant pazopanib.     He met us on 11/29/23 and we prescribed the pazopanib. He received this medicine on 12/11/23 and he started pazopanib at 400mg on 12/14/23. His labs were within normal limits on 12/18/23 and we increased the dose to 800mg on 12/20/23. We also added nifedipine ER 30mg on 12/19/23 for Grade 1 HTN (BP highest read- 150/91 mmHg)     Systemic Treatment:  A Pazopanib concurrent with Radiation.   Started on 12/14/23 at 400mg PO daily. Increased to 800mg PO daily on 12/20/23.  Radiation started on 12/18/23    Subjective   Please refer to \"Notes/Cancer History\" above for complete History of present illness.     Mr Jason Hollins     - Overall feels well.  - Checking BP at home. 120/80-90's.   - Diarrhea for a couple days that is resolving.      Review of Systems:   Review of Systems   Constitutional: Negative.    HENT:  Negative.     Eyes: Negative.    Respiratory: Negative.     Cardiovascular: Negative.  "   Gastrointestinal:  Positive for diarrhea.        X2 days. Resolving now   Endocrine: Negative.    Genitourinary: Negative.     Musculoskeletal: Negative.    Skin: Negative.    Neurological: Negative.    Hematological: Negative.    Psychiatric/Behavioral: Negative.           MEDICAL HISTORY  Past Medical History:   Diagnosis Date    Morbid (severe) obesity due to excess calories (CMS/HCC)     Severe obesity (BMI 35.0-35.9 with comorbidity)    Morbid (severe) obesity due to excess calories (CMS/HCC) 05/31/2016    Severe obesity (BMI 35.0-39.9) with comorbidity    Personal history of other endocrine, nutritional and metabolic disease     History of diabetes mellitus    Personal history of other endocrine, nutritional and metabolic disease     History of hyperlipidemia       FAMILY HISTORY  Family History   Problem Relation Name Age of Onset    Coronary artery disease Mother      Diabetes Mother      Hypertension Mother      Cancer Father      Diabetes Other Grandmother        TOBACCO HISTORY  Tobacco Use: Low Risk  (12/1/2023)    Patient History     Smoking Tobacco Use: Never     Smokeless Tobacco Use: Never     Passive Exposure: Not on file       SOCIAL HISTORY  Social Connections: Not on file        Outpatient Medication Profile:  Current Outpatient Medications on File Prior to Visit   Medication Sig Dispense Refill    aspirin 81 mg EC tablet Take 1 tablet (81 mg) by mouth once daily. AS DIRECTED.      atorvastatin (Lipitor) 40 mg tablet Take 1 tablet (40 mg) by mouth once daily. 90 tablet 1    blood sugar diagnostic (Blood Glucose Test) strip once daily. One Drop Test In Vitro Strip; Test      dulaglutide (Trulicity) 4.5 mg/0.5 mL pen injector Inject 4.5 mg under the skin 1 (one) time per week. 2 mL 2    dulaglutide (TRULICITY) 4.5 mg/0.5 mL pen injector INJECT ONE PEN (4.5 MG) UNDER THE SKIN ONCE WEEKLY 2 mL 2    ergocalciferol (Vitamin D-2) 1.25 MG (76698 UT) capsule Take 1 capsule (50,000 Units) by mouth every  14 (fourteen) days. twice monthly on the 15th and the 30 th for vitamin d deficiency 6 capsule 1    fish oil concentrate (Omega-3) 120-180 mg capsule Take 3 capsules (3,000 mg) by mouth.      gabapentin (Neurontin) 100 mg capsule Take one capsule and increase by 1 capsule every 4 days until taking 3 capsules 90 capsule 1    gabapentin (Neurontin) 300 mg capsule Use the 100 mg capsule to titrate to 300 mg , then use the 300 mg capsule and take 1 to 2 capsules hs for pain 90 capsule 2    ibuprofen 800 mg tablet Take 1 tablet (800 mg) by mouth 3 times a day as needed for mild pain (1 - 3) (pain). 180 tablet 3    metFORMIN (Glucophage) 1,000 mg tablet TAKE 1 TABLET BY MOUTH EVERY MORNING AND 1.5 TABLETS BY MOUTH EVERY EVENING , 225 tablet 0    multivitamin tablet Take 1 tablet by mouth once daily.      NIFEdipine ER (NIFEdipine XL) 30 mg 24 hr tablet Take 1 tablet (30 mg) by mouth once daily in the morning. Take before meals. Do not crush, chew, or split. 90 tablet 3    OneTouch Ultra Test strip Test blood sugar once daily 100 strip 3    PAZOPanib (Votrient) 200 mg tablet Take 4 tablets (800 mg total) by mouth once daily.  Take on an empty stomach, at least 1 hour before or 2 hours after a meal. 112 tablet 3    sildenafil (Viagra) 100 mg tablet Take 1 tablet (100 mg) by mouth once daily as needed (1 hour before needed). 10 tablet 2    tiZANidine (Zanaflex) 4 mg capsule Take 1 capsule (4 mg) by mouth 3 times a day. 40 capsule 2     No current facility-administered medications on file prior to visit.         Performance Status:  Asymptomatic     Vitals and Measurements:   There were no vitals taken for this visit.   Telephone visit      Physical Exam:   Physical Exam   Telephone visit       Lab Results:  I have reviewed these laboratory results:     Hospital Outpatient Visit on 01/04/2024   Component Date Value Ref Range Status    Treatment Site 01/04/2024 Hip_R   Final    Course Number 01/04/2024 1   Final    Prescribed  Fractional Dose 01/04/2024 200  cGray Final    Prescribed Total Dose 01/04/2024 5,000  cGray Final    Actual Fractions Delivered 01/04/2024 12   Final    Prescription Pattern Comment 01/04/2024 Extend CBCT ensure all CTV in field   Final    Actual Session Delivered Dose 01/04/2024 200  cGray Final    Actual Total Dose 01/04/2024 2,400  cGray Final    Prescribed Technique 01/04/2024 VMAT   Final    Elapsed Days 01/04/2024 17   Final    Start Date 01/04/2024 12/18/2023   Final    Last Date 01/04/2024 1/4/2024   Final    Prescribed Number of Fractions 01/04/2024 25   Final   Hospital Outpatient Visit on 01/03/2024   Component Date Value Ref Range Status    Treatment Site 01/03/2024 Hip_R   Final    Course Number 01/03/2024 1   Final    Prescribed Fractional Dose 01/03/2024 200  cGray Final    Prescribed Total Dose 01/03/2024 5,000  cGray Final    Actual Fractions Delivered 01/03/2024 11   Final    Prescription Pattern Comment 01/03/2024 Extend CBCT ensure all CTV in field   Final    Actual Session Delivered Dose 01/03/2024 200  cGray Final    Actual Total Dose 01/03/2024 2,200  cGray Final    Prescribed Technique 01/03/2024 VMAT   Final    Elapsed Days 01/03/2024 16   Final    Start Date 01/03/2024 12/18/2023   Final    Last Date 01/03/2024 1/3/2024   Final    Prescribed Number of Fractions 01/03/2024 25   Final   Hospital Outpatient Visit on 01/02/2024   Component Date Value Ref Range Status    Treatment Site 01/02/2024 Hip_R   Final    Course Number 01/02/2024 1   Final    Prescribed Fractional Dose 01/02/2024 200  cGray Final    Prescribed Total Dose 01/02/2024 5,000  cGray Final    Actual Fractions Delivered 01/02/2024 10   Final    Prescription Pattern Comment 01/02/2024 Extend CBCT ensure all CTV in field   Final    Actual Session Delivered Dose 01/02/2024 200  cGray Final    Actual Total Dose 01/02/2024 2,000  cGray Final    Prescribed Technique 01/02/2024 VMAT   Final    Elapsed Days 01/02/2024 15   Final     Start Date 01/02/2024 12/18/2023   Final    Last Date 01/02/2024 1/2/2024   Final    Prescribed Number of Fractions 01/02/2024 25   Final   Lab on 01/02/2024   Component Date Value Ref Range Status    Thyroid Stimulating Hormone 01/02/2024 3.60  0.44 - 3.98 mIU/L Final    Color, Urine 01/02/2024 Bernadette (N)  Straw, Yellow Final    Appearance, Urine 01/02/2024 Hazy (N)  Clear Final    Specific Gravity, Urine 01/02/2024 1.026  1.005 - 1.035 Final    pH, Urine 01/02/2024 5.0  5.0, 5.5, 6.0, 6.5, 7.0, 7.5, 8.0 Final    Protein, Urine 01/02/2024 100 (2+) (N)  NEGATIVE mg/dL Final    Glucose, Urine 01/02/2024 50 (1+) (A)  NEGATIVE mg/dL Final    Blood, Urine 01/02/2024 NEGATIVE  NEGATIVE Final    Ketones, Urine 01/02/2024 20 (1+) (A)  NEGATIVE mg/dL Final    Bilirubin, Urine 01/02/2024 NEGATIVE  NEGATIVE Final    Urobilinogen, Urine 01/02/2024 2.0 (N)  <2.0 mg/dL Final    Nitrite, Urine 01/02/2024 NEGATIVE  NEGATIVE Final    Leukocyte Esterase, Urine 01/02/2024 NEGATIVE  NEGATIVE Final    LDH 01/02/2024 161  84 - 246 U/L Final    WBC 01/02/2024 4.7  4.4 - 11.3 x10*3/uL Final    nRBC 01/02/2024 0.0  0.0 - 0.0 /100 WBCs Final    RBC 01/02/2024 6.00 (H)  4.50 - 5.90 x10*6/uL Final    Hemoglobin 01/02/2024 15.9  13.5 - 17.5 g/dL Final    Hematocrit 01/02/2024 46.9  41.0 - 52.0 % Final    MCV 01/02/2024 78 (L)  80 - 100 fL Final    MCH 01/02/2024 26.5  26.0 - 34.0 pg Final    MCHC 01/02/2024 33.9  32.0 - 36.0 g/dL Final    RDW 01/02/2024 12.6  11.5 - 14.5 % Final    Platelets 01/02/2024 171  150 - 450 x10*3/uL Final    Neutrophils % 01/02/2024 66.4  40.0 - 80.0 % Final    Immature Granulocytes %, Automated 01/02/2024 0.2  0.0 - 0.9 % Final    Lymphocytes % 01/02/2024 15.1  13.0 - 44.0 % Final    Monocytes % 01/02/2024 10.2  2.0 - 10.0 % Final    Eosinophils % 01/02/2024 7.0  0.0 - 6.0 % Final    Basophils % 01/02/2024 1.1  0.0 - 2.0 % Final    Neutrophils Absolute 01/02/2024 3.11  1.20 - 7.70 x10*3/uL Final    Immature  Granulocytes Absolute, Au* 01/02/2024 0.01  0.00 - 0.70 x10*3/uL Final    Lymphocytes Absolute 01/02/2024 0.71 (L)  1.20 - 4.80 x10*3/uL Final    Monocytes Absolute 01/02/2024 0.48  0.10 - 1.00 x10*3/uL Final    Eosinophils Absolute 01/02/2024 0.33  0.00 - 0.70 x10*3/uL Final    Basophils Absolute 01/02/2024 0.05  0.00 - 0.10 x10*3/uL Final    Glucose 01/02/2024 177 (H)  74 - 99 mg/dL Final    Sodium 01/02/2024 142  136 - 145 mmol/L Final    Potassium 01/02/2024 3.7  3.5 - 5.3 mmol/L Final    Chloride 01/02/2024 106  98 - 107 mmol/L Final    Bicarbonate 01/02/2024 22  21 - 32 mmol/L Final    Anion Gap 01/02/2024 18  10 - 20 mmol/L Final    Urea Nitrogen 01/02/2024 18  6 - 23 mg/dL Final    Creatinine 01/02/2024 1.10  0.50 - 1.30 mg/dL Final    eGFR 01/02/2024 76  >60 mL/min/1.73m*2 Final    Calcium 01/02/2024 9.0  8.6 - 10.3 mg/dL Final    Albumin 01/02/2024 4.3  3.4 - 5.0 g/dL Final    Alkaline Phosphatase 01/02/2024 79  33 - 136 U/L Final    Total Protein 01/02/2024 7.4  6.4 - 8.2 g/dL Final    AST 01/02/2024 39  9 - 39 U/L Final    Bilirubin, Total 01/02/2024 0.9  0.0 - 1.2 mg/dL Final    ALT 01/02/2024 49  10 - 52 U/L Final    WBC, Urine 01/02/2024 1-5  1-5, NONE /HPF Final    RBC, Urine 01/02/2024 NONE  NONE, 1-2, 3-5 /HPF Final    Mucus, Urine 01/02/2024 4+  Reference range not established. /LPF Final    Hyaline Casts, Urine 01/02/2024 2+ (A)  NONE /LPF Final   Hospital Outpatient Visit on 12/29/2023   Component Date Value Ref Range Status    Treatment Site 12/29/2023 Hip_R   Final    Course Number 12/29/2023 1   Final    Prescribed Fractional Dose 12/29/2023 200  cGray Final    Prescribed Total Dose 12/29/2023 5,000  cGray Final    Actual Fractions Delivered 12/29/2023 9   Final    Prescription Pattern Comment 12/29/2023 Extend CBCT ensure all CTV in field   Final    Actual Session Delivered Dose 12/29/2023 200  cGray Final    Actual Total Dose 12/29/2023 1,800  cGray Final    Prescribed Technique 12/29/2023  VMAT   Final    Elapsed Days 12/29/2023 11   Final    Start Date 12/29/2023 12/18/2023   Final    Last Date 12/29/2023 12/29/2023   Final    Prescribed Number of Fractions 12/29/2023 25   Final   Hospital Outpatient Visit on 12/28/2023   Component Date Value Ref Range Status    Treatment Site 12/28/2023 Hip_R   Final    Course Number 12/28/2023 1   Final    Prescribed Fractional Dose 12/28/2023 200  cGray Final    Prescribed Total Dose 12/28/2023 5,000  cGray Final    Actual Fractions Delivered 12/28/2023 8   Final    Prescription Pattern Comment 12/28/2023 Extend CBCT ensure all CTV in field   Final    Actual Session Delivered Dose 12/28/2023 200  cGray Final    Actual Total Dose 12/28/2023 1,600  cGray Final    Prescribed Technique 12/28/2023 VMAT   Final    Elapsed Days 12/28/2023 10   Final    Start Date 12/28/2023 12/18/2023   Final    Last Date 12/28/2023 12/28/2023   Final    Prescribed Number of Fractions 12/28/2023 25   Final   Hospital Outpatient Visit on 12/27/2023   Component Date Value Ref Range Status    Treatment Site 12/27/2023 Hip_R   Final    Course Number 12/27/2023 1   Final    Prescribed Fractional Dose 12/27/2023 200  cGray Final    Prescribed Total Dose 12/27/2023 5,000  cGray Final    Actual Fractions Delivered 12/27/2023 7   Final    Prescription Pattern Comment 12/27/2023 Extend CBCT ensure all CTV in field   Final    Actual Session Delivered Dose 12/27/2023 200  cGray Final    Actual Total Dose 12/27/2023 1,400  cGray Final    Prescribed Technique 12/27/2023 VMAT   Final    Elapsed Days 12/27/2023 9   Final    Start Date 12/27/2023 12/18/2023   Final    Last Date 12/27/2023 12/27/2023   Final    Prescribed Number of Fractions 12/27/2023 25   Final   Hospital Outpatient Visit on 12/26/2023   Component Date Value Ref Range Status    Treatment Site 12/26/2023 Hip_R   Final    Course Number 12/26/2023 1   Final    Prescribed Fractional Dose 12/26/2023 200  cGray Final    Prescribed Total Dose  12/26/2023 5,000  cGray Final    Actual Fractions Delivered 12/26/2023 6   Final    Prescription Pattern Comment 12/26/2023 Extend CBCT ensure all CTV in field   Final    Actual Session Delivered Dose 12/26/2023 200  cGray Final    Actual Total Dose 12/26/2023 1,200  cGray Final    Prescribed Technique 12/26/2023 VMAT   Final    Elapsed Days 12/26/2023 8   Final    Start Date 12/26/2023 12/18/2023   Final    Last Date 12/26/2023 12/26/2023   Final    Prescribed Number of Fractions 12/26/2023 25   Final   Lab on 12/26/2023   Component Date Value Ref Range Status    Prostate Specific Antigen,Screen 12/26/2023 1.00  <=4.00 ng/mL Final    WBC 12/26/2023 6.5  4.4 - 11.3 x10*3/uL Final    nRBC 12/26/2023 0.0  0.0 - 0.0 /100 WBCs Final    RBC 12/26/2023 5.70  4.50 - 5.90 x10*6/uL Final    Hemoglobin 12/26/2023 15.3  13.5 - 17.5 g/dL Final    Hematocrit 12/26/2023 46.5  41.0 - 52.0 % Final    MCV 12/26/2023 82  80 - 100 fL Final    MCH 12/26/2023 26.8  26.0 - 34.0 pg Final    MCHC 12/26/2023 32.9  32.0 - 36.0 g/dL Final    RDW 12/26/2023 12.1  11.5 - 14.5 % Final    Platelets 12/26/2023 227  150 - 450 x10*3/uL Final    Neutrophils % 12/26/2023 66.1  40.0 - 80.0 % Final    Immature Granulocytes %, Automated 12/26/2023 0.3  0.0 - 0.9 % Final    Lymphocytes % 12/26/2023 23.1  13.0 - 44.0 % Final    Monocytes % 12/26/2023 7.2  2.0 - 10.0 % Final    Eosinophils % 12/26/2023 2.5  0.0 - 6.0 % Final    Basophils % 12/26/2023 0.8  0.0 - 2.0 % Final    Neutrophils Absolute 12/26/2023 4.29  1.20 - 7.70 x10*3/uL Final    Immature Granulocytes Absolute, Au* 12/26/2023 0.02  0.00 - 0.70 x10*3/uL Final    Lymphocytes Absolute 12/26/2023 1.50  1.20 - 4.80 x10*3/uL Final    Monocytes Absolute 12/26/2023 0.47  0.10 - 1.00 x10*3/uL Final    Eosinophils Absolute 12/26/2023 0.16  0.00 - 0.70 x10*3/uL Final    Basophils Absolute 12/26/2023 0.05  0.00 - 0.10 x10*3/uL Final    Glucose 12/26/2023 145 (H)  74 - 99 mg/dL Final    Sodium 12/26/2023  144  136 - 145 mmol/L Final    Potassium 12/26/2023 4.0  3.5 - 5.3 mmol/L Final    Chloride 12/26/2023 107  98 - 107 mmol/L Final    Bicarbonate 12/26/2023 26  21 - 32 mmol/L Final    Anion Gap 12/26/2023 15  10 - 20 mmol/L Final    Urea Nitrogen 12/26/2023 17  6 - 23 mg/dL Final    Creatinine 12/26/2023 0.95  0.50 - 1.30 mg/dL Final    eGFR 12/26/2023 90  >60 mL/min/1.73m*2 Final    Calcium 12/26/2023 10.2  8.6 - 10.6 mg/dL Final    Albumin 12/26/2023 4.5  3.4 - 5.0 g/dL Final    Alkaline Phosphatase 12/26/2023 92  33 - 136 U/L Final    Total Protein 12/26/2023 6.9  6.4 - 8.2 g/dL Final    AST 12/26/2023 17  9 - 39 U/L Final    Bilirubin, Total 12/26/2023 0.8  0.0 - 1.2 mg/dL Final    ALT 12/26/2023 25  10 - 52 U/L Final    LDH 12/26/2023 132  84 - 246 U/L Final    Color, Urine 12/26/2023 Bernadette (N)  Straw, Yellow Final    Appearance, Urine 12/26/2023 Hazy (N)  Clear Final    Specific Gravity, Urine 12/26/2023 1.024  1.005 - 1.035 Final    pH, Urine 12/26/2023 5.0  5.0, 5.5, 6.0, 6.5, 7.0, 7.5, 8.0 Final    Protein, Urine 12/26/2023 NEGATIVE  NEGATIVE mg/dL Final    Glucose, Urine 12/26/2023 50 (1+) (A)  NEGATIVE mg/dL Final    Blood, Urine 12/26/2023 NEGATIVE  NEGATIVE Final    Ketones, Urine 12/26/2023 5 (TRACE) (A)  NEGATIVE mg/dL Final    Bilirubin, Urine 12/26/2023 NEGATIVE  NEGATIVE Final    Urobilinogen, Urine 12/26/2023 4.0 (N)  <2.0 mg/dL Final    Nitrite, Urine 12/26/2023 NEGATIVE  NEGATIVE Final    Leukocyte Esterase, Urine 12/26/2023 NEGATIVE  NEGATIVE Final    Thyroid Stimulating Hormone 12/26/2023 2.22  0.44 - 3.98 mIU/L Final   Hospital Outpatient Visit on 12/22/2023   Component Date Value Ref Range Status    Treatment Site 12/22/2023 Hip_R   Final    Course Number 12/22/2023 1   Final    Prescribed Fractional Dose 12/22/2023 200  cGray Final    Prescribed Total Dose 12/22/2023 5,000  cGray Final    Actual Fractions Delivered 12/22/2023 5   Final    Prescription Pattern Comment 12/22/2023 Extend CBCT  ensure all CTV in field   Final    Actual Session Delivered Dose 12/22/2023 200  cGray Final    Actual Total Dose 12/22/2023 1,000  cGray Final    Prescribed Technique 12/22/2023 VMAT   Final    Elapsed Days 12/22/2023 4   Final    Start Date 12/22/2023 12/18/2023   Final    Last Date 12/22/2023 12/22/2023   Final    Prescribed Number of Fractions 12/22/2023 25   Final   There may be more visits with results that are not included.         Radiology Result:  I have reviewed the latest Imaging in PACS and the findings are noted in this note. I discussed the results of the latest imaging with the patient. All previous imaging were reviewed at the time it was completed. Full records are available in the EMR for review as well.     CT CHEST WO IV CONTRAST;  11/17/2023 11:32 am  IMPRESSION:  1.  No evidence of intrathoracic metastatic disease    ==========================================================    MRI of the pelvis with and without IV contrast;  9/29/2023 10:23 am   IMPRESSION:  Complex, heterogeneously enhancing, partially necrotic intramuscular  right gluteus una mass. Imaging characteristics are aggressive  with underlying malignant lesion high in the differential. Recommend  orthopedic oncology consultation and image guided tissue sampling for  further evaluation.     Pathology Results:  I have reviewed the full pathology report recorded in the EMR. The pertinent portions indicating diagnosis are listed here in the note. for details please refer to the full report recorded in the EMR.    Collected 11/13/2023 09:57    Status: Final result    Right gluteal mass, biopsy:  -- Extraskeletal myxoid chondrosarcoma; see note and synoptic report.  -- Immunohistochemical stains performed on formalin-fixed, paraffin embedded sections demonstrate neoplastic cells to be positive for S100 and INI1 (retained nuclear expression), and negative for EMA, AE1/AE3 and synaptophysin.     Note: Sarcoma fusion NGS panel  performed at [facility] shows the presence of a [id number] gene fusion, supporting the above diagnosis.     Assessment and Plan:   Assessment/Plan   Mr. Jason Hollins is a 63 y.o. male with a diagnosis of localized sarcoma. Please see the evolution of the case listed above in the cancer history.     Mr. Hollins started RT on 12/18/23. He increased Pazopanib to 800mg PO daily on 12/20/23. He also had elevation in the blood pressure to a maximum of 150/91 mmHg. He was started on Nifedipine ER 30mg PO daily on 12/19/23. Blood pressures have been 120/80's (one diastolic reading at 91) this last week     Plan:  - Continue Pazopanib start at 800mg po daily.  - Blood test on 01/09/2024   - RTC as phone visit on 01/10/2024.  - Continue Nifedipine ER 30mg PO daily.   - Continue to check BP at home and keep a log of the readings.  - 2+ protein on UA. Creatinine WNL at 0.95. I encouraged him to hydrate and we will check another UA next week.      DISCLAIMER:   In preparing for this visit and writing this note, I reviewed all the previous electronic medical records (labs, imaging and medical charts) of the patient available in the physician portal. Significant findings which helped in decision making are recorded  in this chart.     The plan was discussed with the patient. We gave him ample opportunities to ask questions. All questions were answered to his satisfaction and he verbalized understanding.      INSTRUCTIONS FOR PATIENT  Mr. Jason Hollins ,  It was a pleasure talking to you today.    As discussed, please continue Pazopanib 800mg by mouth daily. Please also continue Nifedipine at 30mg by mouth daily and check your blood pressure daily. Please have labs 01/09/2024. I will call you 01/10/2024.    Instructions to take Pazopanib:  - Take 4 pills together (1 pill at a time). Do not crush the pill. Swallow as a whole tablet.   - Take 1 hour prior to meal or 2 hours after meal.   - Please do not drink or eat Grapefruit, [place]  oranges, Pomelo and Star fruit (and their juice), while you are on this medication.  - If you are taking Pantoprazole, Omeprazole, Lansoprazole or Esmoprazole, then please stop these medicines while you are on Pazopanib.   - Monitor your blood pressure at home. If systolic BP goes above 150mmHg or diastolic BP goes above 94mmHg, then give us a call.   - Every physician/nurse, especially surgeons should know that you are on Pazopanib. Surgeries are CONTRAINDICATED when you are taking pazopanib. Please let your surgeon talk to us before they plan surgery for you.    Please make sure to schedule your appointments as we discussed. Your appointments should also appear in your MyChart.   In case of an emergency please dial 911 or report to your nearest Emergency Room.  For all other questions, please do not hesitate to reach out to us at the number listed below.    Thank you for choosing [facility] at [facility].   We appreciate your visit.     MD Siomara Hernandez APRN Taylor Costello, RN ([facility] location)  Maribel Jaffe RN ([facility])    Sarcoma and Cutaneous Oncology team    Phone: [phone]  Fax: [phone].

## 2024-01-05 ENCOUNTER — HOSPITAL ENCOUNTER (OUTPATIENT)
Dept: RADIATION ONCOLOGY | Facility: CLINIC | Age: 64
Setting detail: RADIATION/ONCOLOGY SERIES
Discharge: HOME | End: 2024-01-05
Payer: COMMERCIAL

## 2024-01-05 DIAGNOSIS — Z51.0 ENCOUNTER FOR ANTINEOPLASTIC RADIATION THERAPY: ICD-10-CM

## 2024-01-05 DIAGNOSIS — C49.21 MALIGNANT NEOPLASM OF CONNECTIVE AND SOFT TISSUE OF RIGHT LOWER LIMB, INCLUDING HIP (MULTI): ICD-10-CM

## 2024-01-05 DIAGNOSIS — C49.9 SOFT TISSUE SARCOMA (MULTI): Primary | ICD-10-CM

## 2024-01-05 LAB
RAD ONC MSQ ACTUAL FRACTIONS DELIVERED: 13
RAD ONC MSQ ACTUAL SESSION DELIVERED DOSE: 200 CGRAY
RAD ONC MSQ ACTUAL TOTAL DOSE: 2600 CGRAY
RAD ONC MSQ ELAPSED DAYS: 18
RAD ONC MSQ LAST DATE: NORMAL
RAD ONC MSQ PRESCRIBED FRACTIONAL DOSE: 200 CGRAY
RAD ONC MSQ PRESCRIBED NUMBER OF FRACTIONS: 25
RAD ONC MSQ PRESCRIBED TECHNIQUE: NORMAL
RAD ONC MSQ PRESCRIBED TOTAL DOSE: 5000 CGRAY
RAD ONC MSQ PRESCRIPTION PATTERN COMMENT: NORMAL
RAD ONC MSQ START DATE: NORMAL
RAD ONC MSQ TREATMENT COURSE NUMBER: 1
RAD ONC MSQ TREATMENT SITE: NORMAL

## 2024-01-05 PROCEDURE — 77386 HC INTENSITY-MODULATED RADIATION THERAPY (IMRT), COMPLEX: CPT | Performed by: STUDENT IN AN ORGANIZED HEALTH CARE EDUCATION/TRAINING PROGRAM

## 2024-01-05 PROCEDURE — 77014 CHG CT GUIDANCE RADIATION THERAPY FLDS PLACEMENT: CPT | Performed by: STUDENT IN AN ORGANIZED HEALTH CARE EDUCATION/TRAINING PROGRAM

## 2024-01-07 RX ORDER — GABAPENTIN 300 MG/1
CAPSULE ORAL
Qty: 90 CAPSULE | Refills: 2 | Status: SHIPPED | OUTPATIENT
Start: 2024-01-07 | End: 2024-04-27

## 2024-01-07 RX ORDER — GABAPENTIN 100 MG/1
CAPSULE ORAL
Qty: 90 CAPSULE | Refills: 0 | Status: SHIPPED | OUTPATIENT
Start: 2024-01-07 | End: 2024-02-28 | Stop reason: ALTCHOICE

## 2024-01-08 ENCOUNTER — HOSPITAL ENCOUNTER (OUTPATIENT)
Dept: RADIATION ONCOLOGY | Facility: CLINIC | Age: 64
Setting detail: RADIATION/ONCOLOGY SERIES
Discharge: HOME | End: 2024-01-08
Payer: COMMERCIAL

## 2024-01-08 DIAGNOSIS — Z51.0 ENCOUNTER FOR ANTINEOPLASTIC RADIATION THERAPY: ICD-10-CM

## 2024-01-08 DIAGNOSIS — C49.21 MALIGNANT NEOPLASM OF CONNECTIVE AND SOFT TISSUE OF RIGHT LOWER LIMB, INCLUDING HIP (MULTI): ICD-10-CM

## 2024-01-08 LAB
RAD ONC MSQ ACTUAL FRACTIONS DELIVERED: 14
RAD ONC MSQ ACTUAL SESSION DELIVERED DOSE: 200 CGRAY
RAD ONC MSQ ACTUAL TOTAL DOSE: 2800 CGRAY
RAD ONC MSQ ELAPSED DAYS: 21
RAD ONC MSQ LAST DATE: NORMAL
RAD ONC MSQ PRESCRIBED FRACTIONAL DOSE: 200 CGRAY
RAD ONC MSQ PRESCRIBED NUMBER OF FRACTIONS: 25
RAD ONC MSQ PRESCRIBED TECHNIQUE: NORMAL
RAD ONC MSQ PRESCRIBED TOTAL DOSE: 5000 CGRAY
RAD ONC MSQ PRESCRIPTION PATTERN COMMENT: NORMAL
RAD ONC MSQ START DATE: NORMAL
RAD ONC MSQ TREATMENT COURSE NUMBER: 1
RAD ONC MSQ TREATMENT SITE: NORMAL

## 2024-01-08 PROCEDURE — 77386 HC INTENSITY-MODULATED RADIATION THERAPY (IMRT), COMPLEX: CPT | Performed by: STUDENT IN AN ORGANIZED HEALTH CARE EDUCATION/TRAINING PROGRAM

## 2024-01-08 PROCEDURE — 77014 CHG CT GUIDANCE RADIATION THERAPY FLDS PLACEMENT: CPT | Performed by: STUDENT IN AN ORGANIZED HEALTH CARE EDUCATION/TRAINING PROGRAM

## 2024-01-09 ENCOUNTER — HOSPITAL ENCOUNTER (OUTPATIENT)
Dept: RADIATION ONCOLOGY | Facility: CLINIC | Age: 64
Setting detail: RADIATION/ONCOLOGY SERIES
Discharge: HOME | End: 2024-01-09
Payer: COMMERCIAL

## 2024-01-09 ENCOUNTER — LAB (OUTPATIENT)
Dept: LAB | Facility: CLINIC | Age: 64
End: 2024-01-09
Payer: COMMERCIAL

## 2024-01-09 DIAGNOSIS — C49.21 MALIGNANT NEOPLASM OF CONNECTIVE AND SOFT TISSUE OF RIGHT LOWER LIMB, INCLUDING HIP (MULTI): ICD-10-CM

## 2024-01-09 DIAGNOSIS — Z79.899 ENCOUNTER FOR MONITORING CARDIOTOXIC DRUG THERAPY: ICD-10-CM

## 2024-01-09 DIAGNOSIS — Z51.81 ENCOUNTER FOR MONITORING CARDIOTOXIC DRUG THERAPY: ICD-10-CM

## 2024-01-09 DIAGNOSIS — Z51.0 ENCOUNTER FOR ANTINEOPLASTIC RADIATION THERAPY: ICD-10-CM

## 2024-01-09 DIAGNOSIS — C49.9 SOFT TISSUE SARCOMA (MULTI): ICD-10-CM

## 2024-01-09 DIAGNOSIS — R53.0 NEOPLASTIC MALIGNANT RELATED FATIGUE: ICD-10-CM

## 2024-01-09 LAB
ALBUMIN SERPL BCP-MCNC: 4.3 G/DL (ref 3.4–5)
ALP SERPL-CCNC: 87 U/L (ref 33–136)
ALT SERPL W P-5'-P-CCNC: 222 U/L (ref 10–52)
ANION GAP SERPL CALC-SCNC: 16 MMOL/L (ref 10–20)
APPEARANCE UR: ABNORMAL
AST SERPL W P-5'-P-CCNC: 136 U/L (ref 9–39)
BASOPHILS # BLD AUTO: 0.07 X10*3/UL (ref 0–0.1)
BASOPHILS NFR BLD AUTO: 1.5 %
BILIRUB SERPL-MCNC: 1.1 MG/DL (ref 0–1.2)
BILIRUB UR STRIP.AUTO-MCNC: NEGATIVE MG/DL
BUN SERPL-MCNC: 20 MG/DL (ref 6–23)
CALCIUM SERPL-MCNC: 9.1 MG/DL (ref 8.6–10.3)
CHLORIDE SERPL-SCNC: 109 MMOL/L (ref 98–107)
CO2 SERPL-SCNC: 21 MMOL/L (ref 21–32)
COLOR UR: ABNORMAL
CREAT SERPL-MCNC: 0.98 MG/DL (ref 0.5–1.3)
EGFRCR SERPLBLD CKD-EPI 2021: 87 ML/MIN/1.73M*2
EOSINOPHIL # BLD AUTO: 0.38 X10*3/UL (ref 0–0.7)
EOSINOPHIL NFR BLD AUTO: 8.1 %
ERYTHROCYTE [DISTWIDTH] IN BLOOD BY AUTOMATED COUNT: 13.2 % (ref 11.5–14.5)
GLUCOSE SERPL-MCNC: 169 MG/DL (ref 74–99)
GLUCOSE UR STRIP.AUTO-MCNC: NEGATIVE MG/DL
HCT VFR BLD AUTO: 44.9 % (ref 41–52)
HGB BLD-MCNC: 15.6 G/DL (ref 13.5–17.5)
HYALINE CASTS #/AREA URNS AUTO: ABNORMAL /LPF
IMM GRANULOCYTES # BLD AUTO: 0 X10*3/UL (ref 0–0.7)
IMM GRANULOCYTES NFR BLD AUTO: 0 % (ref 0–0.9)
KETONES UR STRIP.AUTO-MCNC: ABNORMAL MG/DL
LDH SERPL L TO P-CCNC: 242 U/L (ref 84–246)
LEUKOCYTE ESTERASE UR QL STRIP.AUTO: NEGATIVE
LYMPHOCYTES # BLD AUTO: 0.92 X10*3/UL (ref 1.2–4.8)
LYMPHOCYTES NFR BLD AUTO: 19.5 %
MCH RBC QN AUTO: 27 PG (ref 26–34)
MCHC RBC AUTO-ENTMCNC: 34.7 G/DL (ref 32–36)
MCV RBC AUTO: 78 FL (ref 80–100)
MONOCYTES # BLD AUTO: 0.49 X10*3/UL (ref 0.1–1)
MONOCYTES NFR BLD AUTO: 10.4 %
MUCOUS THREADS #/AREA URNS AUTO: ABNORMAL /LPF
NEUTROPHILS # BLD AUTO: 2.86 X10*3/UL (ref 1.2–7.7)
NEUTROPHILS NFR BLD AUTO: 60.5 %
NITRITE UR QL STRIP.AUTO: NEGATIVE
NRBC BLD-RTO: 0 /100 WBCS (ref 0–0)
PH UR STRIP.AUTO: 5 [PH]
PLATELET # BLD AUTO: 168 X10*3/UL (ref 150–450)
POTASSIUM SERPL-SCNC: 3.8 MMOL/L (ref 3.5–5.3)
PROT SERPL-MCNC: 7.1 G/DL (ref 6.4–8.2)
PROT UR STRIP.AUTO-MCNC: ABNORMAL MG/DL
RAD ONC MSQ ACTUAL FRACTIONS DELIVERED: 15
RAD ONC MSQ ACTUAL SESSION DELIVERED DOSE: 200 CGRAY
RAD ONC MSQ ACTUAL TOTAL DOSE: 3000 CGRAY
RAD ONC MSQ ELAPSED DAYS: 22
RAD ONC MSQ LAST DATE: NORMAL
RAD ONC MSQ PRESCRIBED FRACTIONAL DOSE: 200 CGRAY
RAD ONC MSQ PRESCRIBED NUMBER OF FRACTIONS: 25
RAD ONC MSQ PRESCRIBED TECHNIQUE: NORMAL
RAD ONC MSQ PRESCRIBED TOTAL DOSE: 5000 CGRAY
RAD ONC MSQ PRESCRIPTION PATTERN COMMENT: NORMAL
RAD ONC MSQ START DATE: NORMAL
RAD ONC MSQ TREATMENT COURSE NUMBER: 1
RAD ONC MSQ TREATMENT SITE: NORMAL
RBC # BLD AUTO: 5.78 X10*6/UL (ref 4.5–5.9)
RBC # UR STRIP.AUTO: ABNORMAL /UL
RBC #/AREA URNS AUTO: >20 /HPF
SODIUM SERPL-SCNC: 142 MMOL/L (ref 136–145)
SP GR UR STRIP.AUTO: 1.02
T4 FREE SERPL-MCNC: 1.17 NG/DL (ref 0.78–1.48)
TSH SERPL-ACNC: 4.17 MIU/L (ref 0.44–3.98)
UROBILINOGEN UR STRIP.AUTO-MCNC: 2 MG/DL
WBC # BLD AUTO: 4.7 X10*3/UL (ref 4.4–11.3)
WBC #/AREA URNS AUTO: ABNORMAL /HPF

## 2024-01-09 PROCEDURE — 84443 ASSAY THYROID STIM HORMONE: CPT

## 2024-01-09 PROCEDURE — 36415 COLL VENOUS BLD VENIPUNCTURE: CPT

## 2024-01-09 PROCEDURE — 77386 HC INTENSITY-MODULATED RADIATION THERAPY (IMRT), COMPLEX: CPT | Performed by: STUDENT IN AN ORGANIZED HEALTH CARE EDUCATION/TRAINING PROGRAM

## 2024-01-09 PROCEDURE — 83615 LACTATE (LD) (LDH) ENZYME: CPT

## 2024-01-09 PROCEDURE — 77014 CHG CT GUIDANCE RADIATION THERAPY FLDS PLACEMENT: CPT | Performed by: STUDENT IN AN ORGANIZED HEALTH CARE EDUCATION/TRAINING PROGRAM

## 2024-01-09 PROCEDURE — 85025 COMPLETE CBC W/AUTO DIFF WBC: CPT

## 2024-01-09 PROCEDURE — 80053 COMPREHEN METABOLIC PANEL: CPT

## 2024-01-09 PROCEDURE — 81001 URINALYSIS AUTO W/SCOPE: CPT

## 2024-01-09 PROCEDURE — 84439 ASSAY OF FREE THYROXINE: CPT

## 2024-01-09 PROCEDURE — 77336 RADIATION PHYSICS CONSULT: CPT | Performed by: STUDENT IN AN ORGANIZED HEALTH CARE EDUCATION/TRAINING PROGRAM

## 2024-01-10 ENCOUNTER — RADIATION ONCOLOGY OTV (OUTPATIENT)
Dept: RADIATION ONCOLOGY | Facility: CLINIC | Age: 64
End: 2024-01-10
Payer: COMMERCIAL

## 2024-01-10 ENCOUNTER — OFFICE VISIT (OUTPATIENT)
Dept: HEMATOLOGY/ONCOLOGY | Facility: CLINIC | Age: 64
End: 2024-01-10
Payer: COMMERCIAL

## 2024-01-10 ENCOUNTER — HOSPITAL ENCOUNTER (OUTPATIENT)
Dept: RADIATION ONCOLOGY | Facility: CLINIC | Age: 64
Setting detail: RADIATION/ONCOLOGY SERIES
Discharge: HOME | End: 2024-01-10
Payer: COMMERCIAL

## 2024-01-10 VITALS
SYSTOLIC BLOOD PRESSURE: 126 MMHG | WEIGHT: 261.8 LBS | TEMPERATURE: 97.2 F | RESPIRATION RATE: 18 BRPM | BODY MASS INDEX: 37.56 KG/M2 | HEART RATE: 88 BPM | DIASTOLIC BLOOD PRESSURE: 84 MMHG | OXYGEN SATURATION: 96 %

## 2024-01-10 DIAGNOSIS — C49.21 MALIGNANT NEOPLASM OF CONNECTIVE AND SOFT TISSUE OF RIGHT LOWER LIMB, INCLUDING HIP (MULTI): ICD-10-CM

## 2024-01-10 DIAGNOSIS — C49.9 SOFT TISSUE SARCOMA (MULTI): Primary | ICD-10-CM

## 2024-01-10 DIAGNOSIS — Z51.0 ENCOUNTER FOR ANTINEOPLASTIC RADIATION THERAPY: ICD-10-CM

## 2024-01-10 LAB
RAD ONC MSQ ACTUAL FRACTIONS DELIVERED: 16
RAD ONC MSQ ACTUAL SESSION DELIVERED DOSE: 200 CGRAY
RAD ONC MSQ ACTUAL TOTAL DOSE: 3200 CGRAY
RAD ONC MSQ ELAPSED DAYS: 23
RAD ONC MSQ LAST DATE: NORMAL
RAD ONC MSQ PRESCRIBED FRACTIONAL DOSE: 200 CGRAY
RAD ONC MSQ PRESCRIBED NUMBER OF FRACTIONS: 25
RAD ONC MSQ PRESCRIBED TECHNIQUE: NORMAL
RAD ONC MSQ PRESCRIBED TOTAL DOSE: 5000 CGRAY
RAD ONC MSQ PRESCRIPTION PATTERN COMMENT: NORMAL
RAD ONC MSQ START DATE: NORMAL
RAD ONC MSQ TREATMENT COURSE NUMBER: 1
RAD ONC MSQ TREATMENT SITE: NORMAL

## 2024-01-10 PROCEDURE — 77014 CHG CT GUIDANCE RADIATION THERAPY FLDS PLACEMENT: CPT | Performed by: STUDENT IN AN ORGANIZED HEALTH CARE EDUCATION/TRAINING PROGRAM

## 2024-01-10 PROCEDURE — 99443 PR PHYS/QHP TELEPHONE EVALUATION 21-30 MIN: CPT | Performed by: NURSE PRACTITIONER

## 2024-01-10 PROCEDURE — 77386 HC INTENSITY-MODULATED RADIATION THERAPY (IMRT), COMPLEX: CPT | Performed by: STUDENT IN AN ORGANIZED HEALTH CARE EDUCATION/TRAINING PROGRAM

## 2024-01-10 PROCEDURE — 77427 RADIATION TX MANAGEMENT X5: CPT | Performed by: STUDENT IN AN ORGANIZED HEALTH CARE EDUCATION/TRAINING PROGRAM

## 2024-01-10 PROCEDURE — 3008F BODY MASS INDEX DOCD: CPT | Performed by: NURSE PRACTITIONER

## 2024-01-10 PROCEDURE — 1036F TOBACCO NON-USER: CPT | Performed by: NURSE PRACTITIONER

## 2024-01-10 ASSESSMENT — PAIN SCALES - GENERAL: PAINLEVEL: 1

## 2024-01-10 ASSESSMENT — ENCOUNTER SYMPTOMS
HEMATOLOGIC/LYMPHATIC NEGATIVE: 1
EYES NEGATIVE: 1
ROS GI COMMENTS: TWICE DAILY
NEUROLOGICAL NEGATIVE: 1
ENDOCRINE NEGATIVE: 1
PSYCHIATRIC NEGATIVE: 1
MUSCULOSKELETAL NEGATIVE: 1
RESPIRATORY NEGATIVE: 1
DIARRHEA: 1
CARDIOVASCULAR NEGATIVE: 1
CONSTITUTIONAL NEGATIVE: 1

## 2024-01-10 NOTE — PROGRESS NOTES
".Patient ID: Jason Hollins is a 63 y.o. male.  Referring Physician: No referring provider defined for this encounter.  Primary Care Provider: PHILLIP Gregory-CNP     Oncology follow up    HPI  Mr. Jason Hollins is referred by Dr. 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 He met with our team on 11/29/23.      Mr. Hollins first noticed a mass in the right buttock in June 2023. This was eventually investigated by his PCP and he was found to have a mass. MRI on 09/20/23 reported a 14.9 x 12.7 x 8.3 cm predominantly T2 hyperintense, T1 hypointense, heterogeneously enhancing, encapsulated intramuscular solid lesion in the right gluteus una muscle. He was referred to Dr. Mikal Hawkins, whom he met on 10/25/23. CT guided biopsy of the mass was pursued on 11/13/2023. The pathology slides were discussed in the tumor board and a diagnosis of sarcoma was rendered. He was referred to Radiation Oncology and Medical Oncology for planning pre-operative radiation and neoadjuvant pazopanib.     He met us on 11/29/23 and we prescribed the pazopanib. He received this medicine on 12/11/23 and he started pazopanib at 400mg on 12/14/23. His labs were within normal limits on 12/18/23 and we increased the dose to 800mg on 12/20/23. We also added nifedipine ER 30mg on 12/19/23 for Grade 1 HTN (BP highest read- 150/91 mmHg)     Systemic Treatment:  A Pazopanib concurrent with Radiation.   Started on 12/14/23 at 400mg PO daily. Increased to 800mg PO daily on 12/20/23.  Radiation started on 12/18/23    Subjective   Please refer to \"Notes/Cancer History\" above for complete History of present illness.     Mr Jason Hollins     - Overall feels well.  - Checking BP at home. 120/80-90's. Today 129/82.  - Diarrhea a couple times daily.    - Noticed small amount of blood when he blows his nose. No clots.  - No bleeding in urine or stool that he can see.      Review of Systems:   Review of Systems "   Constitutional: Negative.    HENT:  Negative.     Eyes: Negative.    Respiratory: Negative.     Cardiovascular: Negative.    Gastrointestinal:  Positive for diarrhea.        Twice daily    Endocrine: Negative.    Genitourinary: Negative.     Musculoskeletal: Negative.    Skin: Negative.    Neurological: Negative.    Hematological: Negative.    Psychiatric/Behavioral: Negative.           MEDICAL HISTORY  Past Medical History:   Diagnosis Date    Morbid (severe) obesity due to excess calories (CMS/[facility])     Severe obesity (BMI 35.0-35.9 with comorbidity)    Morbid (severe) obesity due to excess calories (CMS/[facility]) 05/31/2016    Severe obesity (BMI 35.0-39.9) with comorbidity    Personal history of other endocrine, nutritional and metabolic disease     History of diabetes mellitus    Personal history of other endocrine, nutritional and metabolic disease     History of hyperlipidemia       FAMILY HISTORY  Family History   Problem Relation Name Age of Onset    Coronary artery disease Mother      Diabetes Mother      Hypertension Mother      Cancer Father      Diabetes Other Grandmother        TOBACCO HISTORY  Tobacco Use: Low Risk  (1/5/2024)    Patient History     Smoking Tobacco Use: Never     Smokeless Tobacco Use: Never     Passive Exposure: Not on file       SOCIAL HISTORY  Social Connections: Not on file        Outpatient Medication Profile:  Current Outpatient Medications on File Prior to Visit   Medication Sig Dispense Refill    aspirin 81 mg EC tablet Take 1 tablet (81 mg) by mouth once daily. AS DIRECTED.      atorvastatin (Lipitor) 40 mg tablet Take 1 tablet (40 mg) by mouth once daily. 90 tablet 1    blood sugar diagnostic (Blood Glucose Test) strip once daily. One Drop Test In Vitro Strip; Test      dulaglutide (Trulicity) 4.5 mg/0.5 mL pen injector Inject 4.5 mg under the skin 1 (one) time per week. 2 mL 2    dulaglutide (TRULICITY) 4.5 mg/0.5 mL pen injector INJECT ONE PEN (4.5 MG) UNDER THE SKIN ONCE  WEEKLY 2 mL 2    ergocalciferol (Vitamin D-2) 1.25 MG (34790 UT) capsule Take 1 capsule (50,000 Units) by mouth every 14 (fourteen) days. twice monthly on the 15th and the 30 th for vitamin d deficiency 6 capsule 1    fish oil concentrate (Omega-3) 120-180 mg capsule Take 3 capsules (3,000 mg) by mouth.      gabapentin (Neurontin) 100 mg capsule Take one capsule and increase by 1 capsule every 4 days until taking 3 capsules 90 capsule 0    gabapentin (Neurontin) 300 mg capsule Use the 100 mg capsule to titrate to 300 mg , then use the 300 mg capsule and take 1 to 2 capsules hs for pain 90 capsule 2    ibuprofen 800 mg tablet Take 1 tablet (800 mg) by mouth 3 times a day as needed for mild pain (1 - 3) (pain). 180 tablet 3    metFORMIN (Glucophage) 1,000 mg tablet TAKE 1 TABLET BY MOUTH EVERY MORNING AND 1.5 TABLETS BY MOUTH EVERY EVENING , 225 tablet 0    multivitamin tablet Take 1 tablet by mouth once daily.      NIFEdipine ER (NIFEdipine XL) 30 mg 24 hr tablet Take 1 tablet (30 mg) by mouth once daily in the morning. Take before meals. Do not crush, chew, or split. 90 tablet 3    OneTouch Ultra Test strip Test blood sugar once daily 100 strip 3    PAZOPanib (Votrient) 200 mg tablet Take 4 tablets (800 mg total) by mouth once daily.  Take on an empty stomach, at least 1 hour before or 2 hours after a meal. 112 tablet 3    sildenafil (Viagra) 100 mg tablet Take 1 tablet (100 mg) by mouth once daily as needed (1 hour before needed). 10 tablet 2    tiZANidine (Zanaflex) 4 mg capsule Take 1 capsule (4 mg) by mouth 3 times a day. 40 capsule 2    [DISCONTINUED] gabapentin (Neurontin) 100 mg capsule Take one capsule and increase by 1 capsule every 4 days until taking 3 capsules 90 capsule 1    [DISCONTINUED] gabapentin (Neurontin) 300 mg capsule Use the 100 mg capsule to titrate to 300 mg , then use the 300 mg capsule and take 1 to 2 capsules hs for pain 90 capsule 2     No current facility-administered medications on  file prior to visit.         Performance Status:  Asymptomatic     Vitals and Measurements:   There were no vitals taken for this visit.   Telephone visit      Physical Exam:   Physical Exam   Telephone visit       Lab Results:  I have reviewed these laboratory results:     Hospital Outpatient Visit on 01/10/2024   Component Date Value Ref Range Status    Treatment Site 01/10/2024 Hip_R   Final    Course Number 01/10/2024 1   Final    Prescribed Fractional Dose 01/10/2024 200  cGray Final    Prescribed Total Dose 01/10/2024 5,000  cGray Final    Actual Fractions Delivered 01/10/2024 16   Final    Prescription Pattern Comment 01/10/2024 Extend CBCT ensure all CTV in field   Final    Actual Session Delivered Dose 01/10/2024 200  cGray Final    Actual Total Dose 01/10/2024 3,200  cGray Final    Prescribed Technique 01/10/2024 VMAT   Final    Elapsed Days 01/10/2024 23   Final    Start Date 01/10/2024 12/18/2023   Final    Last Date 01/10/2024 1/10/2024   Final    Prescribed Number of Fractions 01/10/2024 25   Final   Hospital Outpatient Visit on 01/09/2024   Component Date Value Ref Range Status    Treatment Site 01/09/2024 Hip_R   Final    Course Number 01/09/2024 1   Final    Prescribed Fractional Dose 01/09/2024 200  cGray Final    Prescribed Total Dose 01/09/2024 5,000  cGray Final    Actual Fractions Delivered 01/09/2024 15   Final    Prescription Pattern Comment 01/09/2024 Extend CBCT ensure all CTV in field   Final    Actual Session Delivered Dose 01/09/2024 200  cGray Final    Actual Total Dose 01/09/2024 3,000  cGray Final    Prescribed Technique 01/09/2024 VMAT   Final    Elapsed Days 01/09/2024 22   Final    Start Date 01/09/2024 12/18/2023   Final    Last Date 01/09/2024 1/9/2024   Final    Prescribed Number of Fractions 01/09/2024 25   Final   Lab on 01/09/2024   Component Date Value Ref Range Status    Thyroid Stimulating Hormone 01/09/2024 4.17 (H)  0.44 - 3.98 mIU/L Final    LDH 01/09/2024 242  84 - 246  U/L Final    Glucose 01/09/2024 169 (H)  74 - 99 mg/dL Final    Sodium 01/09/2024 142  136 - 145 mmol/L Final    Potassium 01/09/2024 3.8  3.5 - 5.3 mmol/L Final    Chloride 01/09/2024 109 (H)  98 - 107 mmol/L Final    Bicarbonate 01/09/2024 21  21 - 32 mmol/L Final    Anion Gap 01/09/2024 16  10 - 20 mmol/L Final    Urea Nitrogen 01/09/2024 20  6 - 23 mg/dL Final    Creatinine 01/09/2024 0.98  0.50 - 1.30 mg/dL Final    eGFR 01/09/2024 87  >60 mL/min/1.73m*2 Final    Calcium 01/09/2024 9.1  8.6 - 10.3 mg/dL Final    Albumin 01/09/2024 4.3  3.4 - 5.0 g/dL Final    Alkaline Phosphatase 01/09/2024 87  33 - 136 U/L Final    Total Protein 01/09/2024 7.1  6.4 - 8.2 g/dL Final    AST 01/09/2024 136 (H)  9 - 39 U/L Final    Bilirubin, Total 01/09/2024 1.1  0.0 - 1.2 mg/dL Final    ALT 01/09/2024 222 (H)  10 - 52 U/L Final    WBC 01/09/2024 4.7  4.4 - 11.3 x10*3/uL Final    nRBC 01/09/2024 0.0  0.0 - 0.0 /100 WBCs Final    RBC 01/09/2024 5.78  4.50 - 5.90 x10*6/uL Final    Hemoglobin 01/09/2024 15.6  13.5 - 17.5 g/dL Final    Hematocrit 01/09/2024 44.9  41.0 - 52.0 % Final    MCV 01/09/2024 78 (L)  80 - 100 fL Final    MCH 01/09/2024 27.0  26.0 - 34.0 pg Final    MCHC 01/09/2024 34.7  32.0 - 36.0 g/dL Final    RDW 01/09/2024 13.2  11.5 - 14.5 % Final    Platelets 01/09/2024 168  150 - 450 x10*3/uL Final    Neutrophils % 01/09/2024 60.5  40.0 - 80.0 % Final    Immature Granulocytes %, Automated 01/09/2024 0.0  0.0 - 0.9 % Final    Lymphocytes % 01/09/2024 19.5  13.0 - 44.0 % Final    Monocytes % 01/09/2024 10.4  2.0 - 10.0 % Final    Eosinophils % 01/09/2024 8.1  0.0 - 6.0 % Final    Basophils % 01/09/2024 1.5  0.0 - 2.0 % Final    Neutrophils Absolute 01/09/2024 2.86  1.20 - 7.70 x10*3/uL Final    Immature Granulocytes Absolute, Au* 01/09/2024 0.00  0.00 - 0.70 x10*3/uL Final    Lymphocytes Absolute 01/09/2024 0.92 (L)  1.20 - 4.80 x10*3/uL Final    Monocytes Absolute 01/09/2024 0.49  0.10 - 1.00 x10*3/uL Final     Eosinophils Absolute 01/09/2024 0.38  0.00 - 0.70 x10*3/uL Final    Basophils Absolute 01/09/2024 0.07  0.00 - 0.10 x10*3/uL Final    Color, Urine 01/09/2024 Bernadette (N)  Straw, Yellow Final    Appearance, Urine 01/09/2024 Hazy (N)  Clear Final    Specific Gravity, Urine 01/09/2024 1.025  1.005 - 1.035 Final    pH, Urine 01/09/2024 5.0  5.0, 5.5, 6.0, 6.5, 7.0, 7.5, 8.0 Final    Protein, Urine 01/09/2024 30 (1+) (N)  NEGATIVE mg/dL Final    Glucose, Urine 01/09/2024 NEGATIVE  NEGATIVE mg/dL Final    Blood, Urine 01/09/2024 LARGE (3+) (A)  NEGATIVE Final    Ketones, Urine 01/09/2024 5 (TRACE) (A)  NEGATIVE mg/dL Final    Bilirubin, Urine 01/09/2024 NEGATIVE  NEGATIVE Final    Urobilinogen, Urine 01/09/2024 2.0 (N)  <2.0 mg/dL Final    Nitrite, Urine 01/09/2024 NEGATIVE  NEGATIVE Final    Leukocyte Esterase, Urine 01/09/2024 NEGATIVE  NEGATIVE Final    Thyroxine, Free 01/09/2024 1.17  0.78 - 1.48 ng/dL Final    WBC, Urine 01/09/2024 6-10 (A)  1-5, NONE /HPF Final    RBC, Urine 01/09/2024 >20 (A)  NONE, 1-2, 3-5 /HPF Final    Mucus, Urine 01/09/2024 4+  Reference range not established. /LPF Final    Hyaline Casts, Urine 01/09/2024 1+ (A)  NONE /LPF Final   Hospital Outpatient Visit on 01/08/2024   Component Date Value Ref Range Status    Treatment Site 01/08/2024 Hip_R   Final    Course Number 01/08/2024 1   Final    Prescribed Fractional Dose 01/08/2024 200  cGray Final    Prescribed Total Dose 01/08/2024 5,000  cGray Final    Actual Fractions Delivered 01/08/2024 14   Final    Prescription Pattern Comment 01/08/2024 Extend CBCT ensure all CTV in field   Final    Actual Session Delivered Dose 01/08/2024 200  cGray Final    Actual Total Dose 01/08/2024 2,800  cGray Final    Prescribed Technique 01/08/2024 VMAT   Final    Elapsed Days 01/08/2024 21   Final    Start Date 01/08/2024 12/18/2023   Final    Last Date 01/08/2024 1/8/2024   Final    Prescribed Number of Fractions 01/08/2024 25   Final   Hospital Outpatient Visit  on 01/05/2024   Component Date Value Ref Range Status    Treatment Site 01/05/2024 Hip_R   Final    Course Number 01/05/2024 1   Final    Prescribed Fractional Dose 01/05/2024 200  cGray Final    Prescribed Total Dose 01/05/2024 5,000  cGray Final    Actual Fractions Delivered 01/05/2024 13   Final    Prescription Pattern Comment 01/05/2024 Extend CBCT ensure all CTV in field   Final    Actual Session Delivered Dose 01/05/2024 200  cGray Final    Actual Total Dose 01/05/2024 2,600  cGray Final    Prescribed Technique 01/05/2024 VMAT   Final    Elapsed Days 01/05/2024 18   Final    Start Date 01/05/2024 12/18/2023   Final    Last Date 01/05/2024 1/5/2024   Final    Prescribed Number of Fractions 01/05/2024 25   Final   Hospital Outpatient Visit on 01/04/2024   Component Date Value Ref Range Status    Treatment Site 01/04/2024 Hip_R   Final    Course Number 01/04/2024 1   Final    Prescribed Fractional Dose 01/04/2024 200  cGray Final    Prescribed Total Dose 01/04/2024 5,000  cGray Final    Actual Fractions Delivered 01/04/2024 12   Final    Prescription Pattern Comment 01/04/2024 Extend CBCT ensure all CTV in field   Final    Actual Session Delivered Dose 01/04/2024 200  cGray Final    Actual Total Dose 01/04/2024 2,400  cGray Final    Prescribed Technique 01/04/2024 VMAT   Final    Elapsed Days 01/04/2024 17   Final    Start Date 01/04/2024 12/18/2023   Final    Last Date 01/04/2024 1/4/2024   Final    Prescribed Number of Fractions 01/04/2024 25   Final   Hospital Outpatient Visit on 01/03/2024   Component Date Value Ref Range Status    Treatment Site 01/03/2024 Hip_R   Final    Course Number 01/03/2024 1   Final    Prescribed Fractional Dose 01/03/2024 200  cGray Final    Prescribed Total Dose 01/03/2024 5,000  cGray Final    Actual Fractions Delivered 01/03/2024 11   Final    Prescription Pattern Comment 01/03/2024 Extend CBCT ensure all CTV in field   Final    Actual Session Delivered Dose 01/03/2024 200  cGray  Final    Actual Total Dose 01/03/2024 2,200  cGray Final    Prescribed Technique 01/03/2024 VMAT   Final    Elapsed Days 01/03/2024 16   Final    Start Date 01/03/2024 12/18/2023   Final    Last Date 01/03/2024 1/3/2024   Final    Prescribed Number of Fractions 01/03/2024 25   Final   Hospital Outpatient Visit on 01/02/2024   Component Date Value Ref Range Status    Treatment Site 01/02/2024 Hip_R   Final    Course Number 01/02/2024 1   Final    Prescribed Fractional Dose 01/02/2024 200  cGray Final    Prescribed Total Dose 01/02/2024 5,000  cGray Final    Actual Fractions Delivered 01/02/2024 10   Final    Prescription Pattern Comment 01/02/2024 Extend CBCT ensure all CTV in field   Final    Actual Session Delivered Dose 01/02/2024 200  cGray Final    Actual Total Dose 01/02/2024 2,000  cGray Final    Prescribed Technique 01/02/2024 VMAT   Final    Elapsed Days 01/02/2024 15   Final    Start Date 01/02/2024 12/18/2023   Final    Last Date 01/02/2024 1/2/2024   Final    Prescribed Number of Fractions 01/02/2024 25   Final   Lab on 01/02/2024   Component Date Value Ref Range Status    Thyroid Stimulating Hormone 01/02/2024 3.60  0.44 - 3.98 mIU/L Final    Color, Urine 01/02/2024 Bernadette (N)  Straw, Yellow Final    Appearance, Urine 01/02/2024 Hazy (N)  Clear Final    Specific Gravity, Urine 01/02/2024 1.026  1.005 - 1.035 Final    pH, Urine 01/02/2024 5.0  5.0, 5.5, 6.0, 6.5, 7.0, 7.5, 8.0 Final    Protein, Urine 01/02/2024 100 (2+) (N)  NEGATIVE mg/dL Final    Glucose, Urine 01/02/2024 50 (1+) (A)  NEGATIVE mg/dL Final    Blood, Urine 01/02/2024 NEGATIVE  NEGATIVE Final    Ketones, Urine 01/02/2024 20 (1+) (A)  NEGATIVE mg/dL Final    Bilirubin, Urine 01/02/2024 NEGATIVE  NEGATIVE Final    Urobilinogen, Urine 01/02/2024 2.0 (N)  <2.0 mg/dL Final    Nitrite, Urine 01/02/2024 NEGATIVE  NEGATIVE Final    Leukocyte Esterase, Urine 01/02/2024 NEGATIVE  NEGATIVE Final    LDH 01/02/2024 161  84 - 246 U/L Final    WBC  01/02/2024 4.7  4.4 - 11.3 x10*3/uL Final    nRBC 01/02/2024 0.0  0.0 - 0.0 /100 WBCs Final    RBC 01/02/2024 6.00 (H)  4.50 - 5.90 x10*6/uL Final    Hemoglobin 01/02/2024 15.9  13.5 - 17.5 g/dL Final    Hematocrit 01/02/2024 46.9  41.0 - 52.0 % Final    MCV 01/02/2024 78 (L)  80 - 100 fL Final    MCH 01/02/2024 26.5  26.0 - 34.0 pg Final    MCHC 01/02/2024 33.9  32.0 - 36.0 g/dL Final    RDW 01/02/2024 12.6  11.5 - 14.5 % Final    Platelets 01/02/2024 171  150 - 450 x10*3/uL Final    Neutrophils % 01/02/2024 66.4  40.0 - 80.0 % Final    Immature Granulocytes %, Automated 01/02/2024 0.2  0.0 - 0.9 % Final    Lymphocytes % 01/02/2024 15.1  13.0 - 44.0 % Final    Monocytes % 01/02/2024 10.2  2.0 - 10.0 % Final    Eosinophils % 01/02/2024 7.0  0.0 - 6.0 % Final    Basophils % 01/02/2024 1.1  0.0 - 2.0 % Final    Neutrophils Absolute 01/02/2024 3.11  1.20 - 7.70 x10*3/uL Final    Immature Granulocytes Absolute, Au* 01/02/2024 0.01  0.00 - 0.70 x10*3/uL Final    Lymphocytes Absolute 01/02/2024 0.71 (L)  1.20 - 4.80 x10*3/uL Final    Monocytes Absolute 01/02/2024 0.48  0.10 - 1.00 x10*3/uL Final    Eosinophils Absolute 01/02/2024 0.33  0.00 - 0.70 x10*3/uL Final    Basophils Absolute 01/02/2024 0.05  0.00 - 0.10 x10*3/uL Final    Glucose 01/02/2024 177 (H)  74 - 99 mg/dL Final    Sodium 01/02/2024 142  136 - 145 mmol/L Final    Potassium 01/02/2024 3.7  3.5 - 5.3 mmol/L Final    Chloride 01/02/2024 106  98 - 107 mmol/L Final    Bicarbonate 01/02/2024 22  21 - 32 mmol/L Final    Anion Gap 01/02/2024 18  10 - 20 mmol/L Final    Urea Nitrogen 01/02/2024 18  6 - 23 mg/dL Final    Creatinine 01/02/2024 1.10  0.50 - 1.30 mg/dL Final    eGFR 01/02/2024 76  >60 mL/min/1.73m*2 Final    Calcium 01/02/2024 9.0  8.6 - 10.3 mg/dL Final    Albumin 01/02/2024 4.3  3.4 - 5.0 g/dL Final    Alkaline Phosphatase 01/02/2024 79  33 - 136 U/L Final    Total Protein 01/02/2024 7.4  6.4 - 8.2 g/dL Final    AST 01/02/2024 39  9 - 39 U/L Final     Bilirubin, Total 01/02/2024 0.9  0.0 - 1.2 mg/dL Final    ALT 01/02/2024 49  10 - 52 U/L Final    WBC, Urine 01/02/2024 1-5  1-5, NONE /HPF Final    RBC, Urine 01/02/2024 NONE  NONE, 1-2, 3-5 /HPF Final    Mucus, Urine 01/02/2024 4+  Reference range not established. /LPF Final    Hyaline Casts, Urine 01/02/2024 2+ (A)  NONE /LPF Final   Hospital Outpatient Visit on 12/29/2023   Component Date Value Ref Range Status    Treatment Site 12/29/2023 Hip_R   Final    Course Number 12/29/2023 1   Final    Prescribed Fractional Dose 12/29/2023 200  cGray Final    Prescribed Total Dose 12/29/2023 5,000  cGray Final    Actual Fractions Delivered 12/29/2023 9   Final    Prescription Pattern Comment 12/29/2023 Extend CBCT ensure all CTV in field   Final    Actual Session Delivered Dose 12/29/2023 200  cGray Final    Actual Total Dose 12/29/2023 1,800  cGray Final    Prescribed Technique 12/29/2023 VMAT   Final    Elapsed Days 12/29/2023 11   Final    Start Date 12/29/2023 12/18/2023   Final    Last Date 12/29/2023 12/29/2023   Final    Prescribed Number of Fractions 12/29/2023 25   Final   There may be more visits with results that are not included.         Radiology Result:  I have reviewed the latest Imaging in PACS and the findings are noted in this note. I discussed the results of the latest imaging with the patient. All previous imaging were reviewed at the time it was completed. Full records are available in the EMR for review as well.     CT CHEST WO IV CONTRAST;  11/17/2023 11:32 am  IMPRESSION:  1.  No evidence of intrathoracic metastatic disease  ==========================================================  MRI of the pelvis with and without IV contrast;  9/29/2023 10:23 am   IMPRESSION:  Complex, heterogeneously enhancing, partially necrotic intramuscular  right gluteus una mass. Imaging characteristics are aggressive  with underlying malignant lesion high in the differential. Recommend  orthopedic oncology  consultation and image guided tissue sampling for  further evaluation.     Pathology Results:  I have reviewed the full pathology report recorded in the EMR. The pertinent portions indicating diagnosis are listed here in the note. for details please refer to the full report recorded in the EMR.    Collected 11/13/2023 09:57    Status: Final result    Right gluteal mass, biopsy:  -- Extraskeletal myxoid chondrosarcoma; see note and synoptic report.  -- Immunohistochemical stains performed on formalin-fixed, paraffin embedded sections demonstrate neoplastic cells to be positive for S100 and INI1 (retained nuclear expression), and negative for EMA, AE1/AE3 and synaptophysin.     Note: Sarcoma fusion NGS panel performed at [facility] shows the presence of a [id number] gene fusion, supporting the above diagnosis.     Assessment and Plan:   Assessment/Plan   Mr. Jason Hollins is a 63 y.o. male with a diagnosis of localized sarcoma. Please see the evolution of the case listed above in the cancer history.     Mr. Hollins started RT on 12/18/23. He increased Pazopanib to 800mg PO daily on 12/20/23. He also had elevation in the blood pressure to a maximum of 150/91 mmHg. He was started on Nifedipine ER 30mg PO daily on 12/19/23. Blood pressures have been 120/80's.      Plan:  - Continue Pazopanib start at 800mg po daily. Blood noted in UA. , . Bili WNL. I will repeat blood work Friday. If still elevated, we will hold medication.   - Blood test on 01/12/2024   - Phone visit 01/12/2024.  - Continue Nifedipine ER 30mg PO daily.   - Continue to check BP at home and keep a log of the readings.    DISCLAIMER:   In preparing for this visit and writing this note, I reviewed all the previous electronic medical records (labs, imaging and medical charts) of the patient available in the physician portal. Significant findings which helped in decision making are recorded  in this chart.     The plan was discussed with the  patient. We gave him ample opportunities to ask questions. All questions were answered to his satisfaction and he verbalized understanding.      INSTRUCTIONS FOR PATIENT  Mr. Jason Hollins ,  It was a pleasure talking to you today.    As discussed, please continue Pazopanib 800mg by mouth daily. Please also continue Nifedipine at 30mg by mouth daily and check your blood pressure daily. Please have labs 01/12/20224. I will call you with results.     Instructions to take Pazopanib:  - Take 4 pills together (1 pill at a time). Do not crush the pill. Swallow as a whole tablet.   - Take 1 hour prior to meal or 2 hours after meal.   - Please do not drink or eat Grapefruit, [place] oranges, Pomelo and Star fruit (and their juice), while you are on this medication.  - If you are taking Pantoprazole, Omeprazole, Lansoprazole or Esmoprazole, then please stop these medicines while you are on Pazopanib.   - Monitor your blood pressure at home. If systolic BP goes above 150mmHg or diastolic BP goes above 94mmHg, then give us a call.   - Every physician/nurse, especially surgeons should know that you are on Pazopanib. Surgeries are CONTRAINDICATED when you are taking pazopanib. Please let your surgeon talk to us before they plan surgery for you.    Please make sure to schedule your appointments as we discussed. Your appointments should also appear in your MyChart.   In case of an emergency please dial 911 or report to your nearest Emergency Room.  For all other questions, please do not hesitate to reach out to us at the number listed below.    Thank you for choosing [facility] at [facility].   We appreciate your visit.     MD Siomara Hernandez APRN Taylor Costello, RN ([facility] location)  Maribel Jaffe RN ([place] location)    Sarcoma and Cutaneous Oncology team    Phone: [phone]  Fax: [phone].

## 2024-01-10 NOTE — PROGRESS NOTES
Radiation Oncology On Treatment Visit    Patient Name:  Jason Hollins  MRN:  [id number]  :  1960    Referring Provider: Mikal Hawkins MD  Primary Care Provider: RAQUEL Gregory  Care Team: Patient Care Team:  RAQUEL Gregory as PCP - General  Chiki Carbajal MD as Consulting Physician (Hematology and Oncology)  Mikal Hawkins MD as Consulting Physician (Orthopaedic Surgery)  Desmond Humphries MD as Radiation Oncologist (Radiation Oncology)    Date of Service: 1/10/2024     Diagnosis:   Specialty Problems          Radiation Oncology Problems    Soft tissue sarcoma (CMS/HCC)         Treatment Summary:  Radiation Treatments       Active   Hip_R (Started on 2023)   Most recent fraction: 200 cGy given on 1/10/2024   Total given: 3,200 cGy / 5,000 cGy  (16 of 25 fractions)   Elapsed Days: 23   Technique: VMAT           Completed   No historical radiation treatments to show.             SUBJECTIVE: Liver enzymes high, sees med onc today. Stable GI side effects with pazopanib. Mild pains of the right leg/hip.  Noticing dry skin of forehead.      OBJECTIVE:   Vital Signs:  /84 (BP Location: Left arm, Patient Position: Sitting)   Pulse 88   Temp 36.2 °C (97.2 °F)   Resp 18   Wt 119 kg (261 lb 12.7 oz)   SpO2 96%   BMI 37.56 kg/m²    Pain Scale: The patient's current pain level was assessed.  They report currently having a pain of 1 out of 10.    Other Pertinent Findings:     Toxicity Assessment          2023    07:55 2023    07:34 1/3/2024    07:28 1/10/2024    07:37   Toxicity Assessment   Treatment Site General General General General   Anorexia Grade 0 Grade 0 Grade 0 Grade 1   Anxiety Grade 0 Grade 0 Grade 0 Grade 0   Dehydration Grade 0 Grade 0 Grade 0 Grade 0   Depression Grade 0 Grade 0 Grade 0 Grade 0   Dermatitis Radiation Grade 0 Grade 0 Grade 0 Grade 0   Diarrhea Grade 1 Grade 1       last couple days 1-2 times Grade 1 Grade 1       2 times a day   Fatigue Grade  0 Grade 0 Grade 1 Grade 1   Fibrosis Deep Connective Tissue Grade 0 Grade 0 Grade 0 Grade 0   Fracture Grade 0 Grade 0 Grade 0 Grade 0   Nausea Grade 0 Grade 0 Grade 0 Grade 0   Pain Grade 1 Grade 1 Grade 0 Grade 1   Treatment Related Secondary Malignancy Grade 0 Grade 0 Grade 0 Grade 0   Tumor Pain Grade 0 Grade 0 Grade 0 Grade 0   Vomiting Grade 0 Grade 0 Grade 0 Grade 0   Alopecia Grade 0 Grade 0 Grade 0 Grade 0   Erythroderma Grade 0 Grade 0 Grade 0 Grade 0   Pain of Skin Grade 0 Grade 0 Grade 0 Grade 0   Pruritus Grade 0 Grade 0 Grade 0 Grade 0   Rash Acneiform Grade 0 Grade 0 Grade 0 Grade 0   Skin Hyperpigmentation Grade 0 Grade 0 Grade 0 Grade 0   Skin Hypopigmentation Grade 0 Grade 0 Grade 0 Grade 0   Skin Induration Grade 0 Grade 0 Grade 0 Grade 0   Skin Ulceration Grade 0 Grade 0 Grade 0 Grade 0   Telangiectasia Grade 0 Grade 0 Grade 0 Grade 0        Concurrent systemic therapy: Pazopanib    Labs:   WBC   Date Value Ref Range Status   01/09/2024 4.7 4.4 - 11.3 x10*3/uL Final   01/02/2024 4.7 4.4 - 11.3 x10*3/uL Final     Hemoglobin   Date Value Ref Range Status   01/09/2024 15.6 13.5 - 17.5 g/dL Final   01/02/2024 15.9 13.5 - 17.5 g/dL Final     Hematocrit   Date Value Ref Range Status   01/09/2024 44.9 41.0 - 52.0 % Final   01/02/2024 46.9 41.0 - 52.0 % Final     Neutrophils Absolute   Date Value Ref Range Status   01/09/2024 2.86 1.20 - 7.70 x10*3/uL Final     Comment:     Percent differential counts (%) should be interpreted in the context of the absolute cell counts (cells/uL).   01/02/2024 3.11 1.20 - 7.70 x10*3/uL Final     Comment:     Percent differential counts (%) should be interpreted in the context of the absolute cell counts (cells/uL).     Platelets   Date Value Ref Range Status   01/09/2024 168 150 - 450 x10*3/uL Final   01/02/2024 171 150 - 450 x10*3/uL Final     Alkaline Phosphatase   Date Value Ref Range Status   01/09/2024 87 33 - 136 U/L Final   01/02/2024 79 33 - 136 U/L Final     AST    Date Value Ref Range Status   01/09/2024 136 (H) 9 - 39 U/L Final   01/02/2024 39 9 - 39 U/L Final     ALT   Date Value Ref Range Status   01/09/2024 222 (H) 10 - 52 U/L Final     Comment:     Patients treated with Sulfasalazine may generate falsely decreased results for ALT.   01/02/2024 49 10 - 52 U/L Final     Comment:     Patients treated with Sulfasalazine may generate falsely decreased results for ALT.     Bilirubin, Total   Date Value Ref Range Status   01/09/2024 1.1 0.0 - 1.2 mg/dL Final   01/02/2024 0.9 0.0 - 1.2 mg/dL Final     Glucose   Date Value Ref Range Status   01/09/2024 169 (H) 74 - 99 mg/dL Final   01/02/2024 177 (H) 74 - 99 mg/dL Final     Calcium   Date Value Ref Range Status   01/09/2024 9.1 8.6 - 10.3 mg/dL Final   01/02/2024 9.0 8.6 - 10.3 mg/dL Final     Sodium   Date Value Ref Range Status   01/09/2024 142 136 - 145 mmol/L Final   01/02/2024 142 136 - 145 mmol/L Final     Potassium   Date Value Ref Range Status   01/09/2024 3.8 3.5 - 5.3 mmol/L Final   01/02/2024 3.7 3.5 - 5.3 mmol/L Final     Bicarbonate   Date Value Ref Range Status   01/09/2024 21 21 - 32 mmol/L Final   01/02/2024 22 21 - 32 mmol/L Final     Chloride   Date Value Ref Range Status   01/09/2024 109 (H) 98 - 107 mmol/L Final   01/02/2024 106 98 - 107 mmol/L Final     Urea Nitrogen   Date Value Ref Range Status   01/09/2024 20 6 - 23 mg/dL Final   01/02/2024 18 6 - 23 mg/dL Final     Creatinine   Date Value Ref Range Status   01/09/2024 0.98 0.50 - 1.30 mg/dL Final   01/02/2024 1.10 0.50 - 1.30 mg/dL Final         Exam: Dry skin of the forehead.     Assessment / Plan:  The patient is tolerating radiation therapy as anticipated.  Continue per current treatment plan.         Therapies: Recommended aquaphor or moisturizer for dry skin. Discussion of elevated ALT/AST with med onc today. Monitor GI side effects and pain.      Side effects reviewed with patient. Images, chart and labs reviewed.    Joby Birch MD

## 2024-01-11 ENCOUNTER — HOSPITAL ENCOUNTER (OUTPATIENT)
Dept: RADIATION ONCOLOGY | Facility: CLINIC | Age: 64
Setting detail: RADIATION/ONCOLOGY SERIES
Discharge: HOME | End: 2024-01-11
Payer: COMMERCIAL

## 2024-01-11 DIAGNOSIS — C49.21 MALIGNANT NEOPLASM OF CONNECTIVE AND SOFT TISSUE OF RIGHT LOWER LIMB, INCLUDING HIP (MULTI): ICD-10-CM

## 2024-01-11 DIAGNOSIS — Z51.0 ENCOUNTER FOR ANTINEOPLASTIC RADIATION THERAPY: ICD-10-CM

## 2024-01-11 LAB
RAD ONC MSQ ACTUAL FRACTIONS DELIVERED: 17
RAD ONC MSQ ACTUAL SESSION DELIVERED DOSE: 200 CGRAY
RAD ONC MSQ ACTUAL TOTAL DOSE: 3400 CGRAY
RAD ONC MSQ ELAPSED DAYS: 24
RAD ONC MSQ LAST DATE: NORMAL
RAD ONC MSQ PRESCRIBED FRACTIONAL DOSE: 200 CGRAY
RAD ONC MSQ PRESCRIBED NUMBER OF FRACTIONS: 25
RAD ONC MSQ PRESCRIBED TECHNIQUE: NORMAL
RAD ONC MSQ PRESCRIBED TOTAL DOSE: 5000 CGRAY
RAD ONC MSQ PRESCRIPTION PATTERN COMMENT: NORMAL
RAD ONC MSQ START DATE: NORMAL
RAD ONC MSQ TREATMENT COURSE NUMBER: 1
RAD ONC MSQ TREATMENT SITE: NORMAL

## 2024-01-11 PROCEDURE — 77386 HC INTENSITY-MODULATED RADIATION THERAPY (IMRT), COMPLEX: CPT | Performed by: STUDENT IN AN ORGANIZED HEALTH CARE EDUCATION/TRAINING PROGRAM

## 2024-01-11 PROCEDURE — 77014 CHG CT GUIDANCE RADIATION THERAPY FLDS PLACEMENT: CPT | Performed by: STUDENT IN AN ORGANIZED HEALTH CARE EDUCATION/TRAINING PROGRAM

## 2024-01-12 ENCOUNTER — HOSPITAL ENCOUNTER (OUTPATIENT)
Dept: RADIATION ONCOLOGY | Facility: CLINIC | Age: 64
Setting detail: RADIATION/ONCOLOGY SERIES
Discharge: HOME | End: 2024-01-12
Payer: COMMERCIAL

## 2024-01-12 ENCOUNTER — OFFICE VISIT (OUTPATIENT)
Dept: HEMATOLOGY/ONCOLOGY | Facility: CLINIC | Age: 64
End: 2024-01-12
Payer: COMMERCIAL

## 2024-01-12 ENCOUNTER — LAB (OUTPATIENT)
Dept: LAB | Facility: CLINIC | Age: 64
End: 2024-01-12
Payer: COMMERCIAL

## 2024-01-12 DIAGNOSIS — C49.9 SOFT TISSUE SARCOMA (MULTI): Primary | ICD-10-CM

## 2024-01-12 DIAGNOSIS — R53.0 NEOPLASTIC MALIGNANT RELATED FATIGUE: ICD-10-CM

## 2024-01-12 DIAGNOSIS — C49.9 SOFT TISSUE SARCOMA (MULTI): ICD-10-CM

## 2024-01-12 DIAGNOSIS — Z51.0 ENCOUNTER FOR ANTINEOPLASTIC RADIATION THERAPY: ICD-10-CM

## 2024-01-12 DIAGNOSIS — C49.21 MALIGNANT NEOPLASM OF CONNECTIVE AND SOFT TISSUE OF RIGHT LOWER LIMB, INCLUDING HIP (MULTI): ICD-10-CM

## 2024-01-12 DIAGNOSIS — Z79.899 ENCOUNTER FOR MONITORING CARDIOTOXIC DRUG THERAPY: ICD-10-CM

## 2024-01-12 DIAGNOSIS — Z51.81 ENCOUNTER FOR MONITORING CARDIOTOXIC DRUG THERAPY: ICD-10-CM

## 2024-01-12 LAB
ALBUMIN SERPL BCP-MCNC: 4.2 G/DL (ref 3.4–5)
ALP SERPL-CCNC: 81 U/L (ref 33–136)
ALT SERPL W P-5'-P-CCNC: 213 U/L (ref 10–52)
ANION GAP SERPL CALC-SCNC: 13 MMOL/L (ref 10–20)
APPEARANCE UR: ABNORMAL
AST SERPL W P-5'-P-CCNC: 108 U/L (ref 9–39)
BASOPHILS # BLD AUTO: 0.04 X10*3/UL (ref 0–0.1)
BASOPHILS NFR BLD AUTO: 1 %
BILIRUB SERPL-MCNC: 1.1 MG/DL (ref 0–1.2)
BILIRUB UR STRIP.AUTO-MCNC: NEGATIVE MG/DL
BUN SERPL-MCNC: 18 MG/DL (ref 6–23)
CALCIUM SERPL-MCNC: 9.3 MG/DL (ref 8.6–10.3)
CAOX CRY #/AREA UR COMP ASSIST: ABNORMAL /HPF
CHLORIDE SERPL-SCNC: 107 MMOL/L (ref 98–107)
CO2 SERPL-SCNC: 27 MMOL/L (ref 21–32)
COLOR UR: ABNORMAL
CREAT SERPL-MCNC: 1.05 MG/DL (ref 0.5–1.3)
EGFRCR SERPLBLD CKD-EPI 2021: 80 ML/MIN/1.73M*2
EOSINOPHIL # BLD AUTO: 0.33 X10*3/UL (ref 0–0.7)
EOSINOPHIL NFR BLD AUTO: 8.7 %
ERYTHROCYTE [DISTWIDTH] IN BLOOD BY AUTOMATED COUNT: 13.6 % (ref 11.5–14.5)
GLUCOSE SERPL-MCNC: 158 MG/DL (ref 74–99)
GLUCOSE UR STRIP.AUTO-MCNC: ABNORMAL MG/DL
HCT VFR BLD AUTO: 43.5 % (ref 41–52)
HGB BLD-MCNC: 14.8 G/DL (ref 13.5–17.5)
IMM GRANULOCYTES # BLD AUTO: 0.01 X10*3/UL (ref 0–0.7)
IMM GRANULOCYTES NFR BLD AUTO: 0.3 % (ref 0–0.9)
KETONES UR STRIP.AUTO-MCNC: NEGATIVE MG/DL
LDH SERPL L TO P-CCNC: 193 U/L (ref 84–246)
LEUKOCYTE ESTERASE UR QL STRIP.AUTO: NEGATIVE
LYMPHOCYTES # BLD AUTO: 0.78 X10*3/UL (ref 1.2–4.8)
LYMPHOCYTES NFR BLD AUTO: 20.5 %
MCH RBC QN AUTO: 26.9 PG (ref 26–34)
MCHC RBC AUTO-ENTMCNC: 34 G/DL (ref 32–36)
MCV RBC AUTO: 79 FL (ref 80–100)
MONOCYTES # BLD AUTO: 0.39 X10*3/UL (ref 0.1–1)
MONOCYTES NFR BLD AUTO: 10.2 %
MUCOUS THREADS #/AREA URNS AUTO: ABNORMAL /LPF
NEUTROPHILS # BLD AUTO: 2.26 X10*3/UL (ref 1.2–7.7)
NEUTROPHILS NFR BLD AUTO: 59.3 %
NITRITE UR QL STRIP.AUTO: NEGATIVE
NRBC BLD-RTO: 0 /100 WBCS (ref 0–0)
PH UR STRIP.AUTO: 5 [PH]
PLATELET # BLD AUTO: 168 X10*3/UL (ref 150–450)
POTASSIUM SERPL-SCNC: 3.7 MMOL/L (ref 3.5–5.3)
PROT SERPL-MCNC: 7 G/DL (ref 6.4–8.2)
PROT UR STRIP.AUTO-MCNC: ABNORMAL MG/DL
RAD ONC MSQ ACTUAL FRACTIONS DELIVERED: 18
RAD ONC MSQ ACTUAL SESSION DELIVERED DOSE: 200 CGRAY
RAD ONC MSQ ACTUAL TOTAL DOSE: 3600 CGRAY
RAD ONC MSQ ELAPSED DAYS: 25
RAD ONC MSQ LAST DATE: NORMAL
RAD ONC MSQ PRESCRIBED FRACTIONAL DOSE: 200 CGRAY
RAD ONC MSQ PRESCRIBED NUMBER OF FRACTIONS: 25
RAD ONC MSQ PRESCRIBED TECHNIQUE: NORMAL
RAD ONC MSQ PRESCRIBED TOTAL DOSE: 5000 CGRAY
RAD ONC MSQ PRESCRIPTION PATTERN COMMENT: NORMAL
RAD ONC MSQ START DATE: NORMAL
RAD ONC MSQ TREATMENT COURSE NUMBER: 1
RAD ONC MSQ TREATMENT SITE: NORMAL
RBC # BLD AUTO: 5.5 X10*6/UL (ref 4.5–5.9)
RBC # UR STRIP.AUTO: ABNORMAL /UL
RBC #/AREA URNS AUTO: ABNORMAL /HPF
SODIUM SERPL-SCNC: 143 MMOL/L (ref 136–145)
SP GR UR STRIP.AUTO: 1.03
T4 FREE SERPL-MCNC: 1.12 NG/DL (ref 0.78–1.48)
TSH SERPL-ACNC: 4.24 MIU/L (ref 0.44–3.98)
UROBILINOGEN UR STRIP.AUTO-MCNC: 2 MG/DL
WBC # BLD AUTO: 3.8 X10*3/UL (ref 4.4–11.3)
WBC #/AREA URNS AUTO: ABNORMAL /HPF

## 2024-01-12 PROCEDURE — 84439 ASSAY OF FREE THYROXINE: CPT

## 2024-01-12 PROCEDURE — 3008F BODY MASS INDEX DOCD: CPT | Performed by: NURSE PRACTITIONER

## 2024-01-12 PROCEDURE — 1036F TOBACCO NON-USER: CPT | Performed by: NURSE PRACTITIONER

## 2024-01-12 PROCEDURE — 36415 COLL VENOUS BLD VENIPUNCTURE: CPT

## 2024-01-12 PROCEDURE — 99443 PR PHYS/QHP TELEPHONE EVALUATION 21-30 MIN: CPT | Performed by: NURSE PRACTITIONER

## 2024-01-12 PROCEDURE — 85025 COMPLETE CBC W/AUTO DIFF WBC: CPT

## 2024-01-12 PROCEDURE — 81001 URINALYSIS AUTO W/SCOPE: CPT

## 2024-01-12 PROCEDURE — 77386 HC INTENSITY-MODULATED RADIATION THERAPY (IMRT), COMPLEX: CPT | Performed by: STUDENT IN AN ORGANIZED HEALTH CARE EDUCATION/TRAINING PROGRAM

## 2024-01-12 PROCEDURE — 77014 CHG CT GUIDANCE RADIATION THERAPY FLDS PLACEMENT: CPT | Performed by: STUDENT IN AN ORGANIZED HEALTH CARE EDUCATION/TRAINING PROGRAM

## 2024-01-12 PROCEDURE — 84075 ASSAY ALKALINE PHOSPHATASE: CPT

## 2024-01-12 PROCEDURE — 83615 LACTATE (LD) (LDH) ENZYME: CPT

## 2024-01-12 PROCEDURE — 84443 ASSAY THYROID STIM HORMONE: CPT

## 2024-01-12 RX ORDER — PREDNISONE 5 MG/1
50 TABLET ORAL DAILY
Qty: 12 TABLET | Refills: 0 | Status: SHIPPED | OUTPATIENT
Start: 2024-01-12 | End: 2024-01-16 | Stop reason: WASHOUT

## 2024-01-12 ASSESSMENT — ENCOUNTER SYMPTOMS
HEMATOLOGIC/LYMPHATIC NEGATIVE: 1
EYES NEGATIVE: 1
CARDIOVASCULAR NEGATIVE: 1
MUSCULOSKELETAL NEGATIVE: 1
ENDOCRINE NEGATIVE: 1
PSYCHIATRIC NEGATIVE: 1
DIARRHEA: 1
CONSTITUTIONAL NEGATIVE: 1
RESPIRATORY NEGATIVE: 1
NEUROLOGICAL NEGATIVE: 1

## 2024-01-12 NOTE — PROGRESS NOTES
".Patient ID: Jason Hollins is a 63 y.o. male.  Referring Physician: No referring provider defined for this encounter.  Primary Care Provider: PHILLIP Gregory-CNP     Oncology follow up    HPI  Mr. Jason Hollins is referred by Dr. 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 He met with our team on 11/29/23.      Mr. Hollins first noticed a mass in the right buttock in June 2023. This was eventually investigated by his PCP and he was found to have a mass. MRI on 09/20/23 reported a 14.9 x 12.7 x 8.3 cm predominantly T2 hyperintense, T1 hypointense, heterogeneously enhancing, encapsulated intramuscular solid lesion in the right gluteus una muscle. He was referred to Dr. Mikal Hawkins, whom he met on 10/25/23. CT guided biopsy of the mass was pursued on 11/13/2023. The pathology slides were discussed in the tumor board and a diagnosis of sarcoma was rendered. He was referred to Radiation Oncology and Medical Oncology for planning pre-operative radiation and neoadjuvant pazopanib.     He met us on 11/29/23 and we prescribed the pazopanib. He received this medicine on 12/11/23 and he started pazopanib at 400mg on 12/14/23. His labs were within normal limits on 12/18/23 and we increased the dose to 800mg on 12/20/23. We also added nifedipine ER 30mg on 12/19/23 for Grade 1 HTN (BP highest read- 150/91 mmHg)     Systemic Treatment:  A Pazopanib concurrent with Radiation.   Started on 12/14/23 at 400mg PO daily. Increased to 800mg PO daily on 12/20/23.  Radiation started on 12/18/23    Subjective   Please refer to \"Notes/Cancer History\" above for complete History of present illness.     Mr Jason Hollins     - Overall feels well.  - Checking BP at home. 120-130/80-90's.  - Diarrhea decreased once daily if he eats a lot.   - Noticed scant amount of blood when he blows his nose. Doesn't happen all the time. No clots.  - No bleeding in urine or stool that he can see.      Review of Systems: "   Review of Systems   Constitutional: Negative.    HENT:  Negative.     Eyes: Negative.    Respiratory: Negative.     Cardiovascular: Negative.    Gastrointestinal:  Positive for diarrhea.        Once a day if he eats a lot   Endocrine: Negative.    Genitourinary: Negative.     Musculoskeletal: Negative.    Skin: Negative.    Neurological: Negative.    Hematological: Negative.    Psychiatric/Behavioral: Negative.           MEDICAL HISTORY  Past Medical History:   Diagnosis Date    Morbid (severe) obesity due to excess calories (CMS/[facility])     Severe obesity (BMI 35.0-35.9 with comorbidity)    Morbid (severe) obesity due to excess calories (CMS/[facility]) 05/31/2016    Severe obesity (BMI 35.0-39.9) with comorbidity    Personal history of other endocrine, nutritional and metabolic disease     History of diabetes mellitus    Personal history of other endocrine, nutritional and metabolic disease     History of hyperlipidemia       FAMILY HISTORY  Family History   Problem Relation Name Age of Onset    Coronary artery disease Mother      Diabetes Mother      Hypertension Mother      Cancer Father      Diabetes Other Grandmother        TOBACCO HISTORY  Tobacco Use: Low Risk  (1/5/2024)    Patient History     Smoking Tobacco Use: Never     Smokeless Tobacco Use: Never     Passive Exposure: Not on file       SOCIAL HISTORY  Social Connections: Not on file   . Lives with his wife. Has 2 children. Working full time.      Outpatient Medication Profile:  Current Outpatient Medications on File Prior to Visit   Medication Sig Dispense Refill    aspirin 81 mg EC tablet Take 1 tablet (81 mg) by mouth once daily. AS DIRECTED.      atorvastatin (Lipitor) 40 mg tablet Take 1 tablet (40 mg) by mouth once daily. 90 tablet 1    blood sugar diagnostic (Blood Glucose Test) strip once daily. One Drop Test In Vitro Strip; Test      dulaglutide (Trulicity) 4.5 mg/0.5 mL pen injector Inject 4.5 mg under the skin 1 (one) time per week. 2 mL 2     dulaglutide (TRULICITY) 4.5 mg/0.5 mL pen injector INJECT ONE PEN (4.5 MG) UNDER THE SKIN ONCE WEEKLY 2 mL 2    ergocalciferol (Vitamin D-2) 1.25 MG (90259 UT) capsule Take 1 capsule (50,000 Units) by mouth every 14 (fourteen) days. twice monthly on the 15th and the 30 th for vitamin d deficiency 6 capsule 1    fish oil concentrate (Omega-3) 120-180 mg capsule Take 3 capsules (3,000 mg) by mouth.      gabapentin (Neurontin) 100 mg capsule Take one capsule and increase by 1 capsule every 4 days until taking 3 capsules 90 capsule 0    gabapentin (Neurontin) 300 mg capsule Use the 100 mg capsule to titrate to 300 mg , then use the 300 mg capsule and take 1 to 2 capsules hs for pain 90 capsule 2    ibuprofen 800 mg tablet Take 1 tablet (800 mg) by mouth 3 times a day as needed for mild pain (1 - 3) (pain). 180 tablet 3    metFORMIN (Glucophage) 1,000 mg tablet TAKE 1 TABLET BY MOUTH EVERY MORNING AND 1.5 TABLETS BY MOUTH EVERY EVENING , 225 tablet 0    multivitamin tablet Take 1 tablet by mouth once daily.      NIFEdipine ER (NIFEdipine XL) 30 mg 24 hr tablet Take 1 tablet (30 mg) by mouth once daily in the morning. Take before meals. Do not crush, chew, or split. 90 tablet 3    OneTouch Ultra Test strip Test blood sugar once daily 100 strip 3    PAZOPanib (Votrient) 200 mg tablet Take 4 tablets (800 mg total) by mouth once daily.  Take on an empty stomach, at least 1 hour before or 2 hours after a meal. 112 tablet 3    sildenafil (Viagra) 100 mg tablet Take 1 tablet (100 mg) by mouth once daily as needed (1 hour before needed). 10 tablet 2    tiZANidine (Zanaflex) 4 mg capsule Take 1 capsule (4 mg) by mouth 3 times a day. 40 capsule 2     No current facility-administered medications on file prior to visit.         Performance Status:  Asymptomatic     Vitals and Measurements:   There were no vitals taken for this visit.   Telephone visit      Physical Exam:   Physical Exam   Telephone visit       Lab Results:  I have  reviewed these laboratory results:     Lab on 01/12/2024   Component Date Value Ref Range Status    Thyroid Stimulating Hormone 01/12/2024 4.24 (H)  0.44 - 3.98 mIU/L Final    Color, Urine 01/12/2024 Bernadette (N)  Straw, Yellow Final    Appearance, Urine 01/12/2024 Hazy (N)  Clear Final    Specific Gravity, Urine 01/12/2024 1.028  1.005 - 1.035 Final    pH, Urine 01/12/2024 5.0  5.0, 5.5, 6.0, 6.5, 7.0, 7.5, 8.0 Final    Protein, Urine 01/12/2024 30 (1+) (N)  NEGATIVE mg/dL Final    Glucose, Urine 01/12/2024 50 (1+) (A)  NEGATIVE mg/dL Final    Blood, Urine 01/12/2024 SMALL (1+) (A)  NEGATIVE Final    Ketones, Urine 01/12/2024 NEGATIVE  NEGATIVE mg/dL Final    Bilirubin, Urine 01/12/2024 NEGATIVE  NEGATIVE Final    Urobilinogen, Urine 01/12/2024 2.0 (N)  <2.0 mg/dL Final    Nitrite, Urine 01/12/2024 NEGATIVE  NEGATIVE Final    Leukocyte Esterase, Urine 01/12/2024 NEGATIVE  NEGATIVE Final    LDH 01/12/2024 193  84 - 246 U/L Final    Glucose 01/12/2024 158 (H)  74 - 99 mg/dL Final    Sodium 01/12/2024 143  136 - 145 mmol/L Final    Potassium 01/12/2024 3.7  3.5 - 5.3 mmol/L Final    Chloride 01/12/2024 107  98 - 107 mmol/L Final    Bicarbonate 01/12/2024 27  21 - 32 mmol/L Final    Anion Gap 01/12/2024 13  10 - 20 mmol/L Final    Urea Nitrogen 01/12/2024 18  6 - 23 mg/dL Final    Creatinine 01/12/2024 1.05  0.50 - 1.30 mg/dL Final    eGFR 01/12/2024 80  >60 mL/min/1.73m*2 Final    Calcium 01/12/2024 9.3  8.6 - 10.3 mg/dL Final    Albumin 01/12/2024 4.2  3.4 - 5.0 g/dL Final    Alkaline Phosphatase 01/12/2024 81  33 - 136 U/L Final    Total Protein 01/12/2024 7.0  6.4 - 8.2 g/dL Final    AST 01/12/2024 108 (H)  9 - 39 U/L Final    Bilirubin, Total 01/12/2024 1.1  0.0 - 1.2 mg/dL Final    ALT 01/12/2024 213 (H)  10 - 52 U/L Final    WBC 01/12/2024 3.8 (L)  4.4 - 11.3 x10*3/uL Final    nRBC 01/12/2024 0.0  0.0 - 0.0 /100 WBCs Final    RBC 01/12/2024 5.50  4.50 - 5.90 x10*6/uL Final    Hemoglobin 01/12/2024 14.8  13.5 - 17.5  g/dL Final    Hematocrit 01/12/2024 43.5  41.0 - 52.0 % Final    MCV 01/12/2024 79 (L)  80 - 100 fL Final    MCH 01/12/2024 26.9  26.0 - 34.0 pg Final    MCHC 01/12/2024 34.0  32.0 - 36.0 g/dL Final    RDW 01/12/2024 13.6  11.5 - 14.5 % Final    Platelets 01/12/2024 168  150 - 450 x10*3/uL Final    Neutrophils % 01/12/2024 59.3  40.0 - 80.0 % Final    Immature Granulocytes %, Automated 01/12/2024 0.3  0.0 - 0.9 % Final    Lymphocytes % 01/12/2024 20.5  13.0 - 44.0 % Final    Monocytes % 01/12/2024 10.2  2.0 - 10.0 % Final    Eosinophils % 01/12/2024 8.7  0.0 - 6.0 % Final    Basophils % 01/12/2024 1.0  0.0 - 2.0 % Final    Neutrophils Absolute 01/12/2024 2.26  1.20 - 7.70 x10*3/uL Final    Immature Granulocytes Absolute, Au* 01/12/2024 0.01  0.00 - 0.70 x10*3/uL Final    Lymphocytes Absolute 01/12/2024 0.78 (L)  1.20 - 4.80 x10*3/uL Final    Monocytes Absolute 01/12/2024 0.39  0.10 - 1.00 x10*3/uL Final    Eosinophils Absolute 01/12/2024 0.33  0.00 - 0.70 x10*3/uL Final    Basophils Absolute 01/12/2024 0.04  0.00 - 0.10 x10*3/uL Final    WBC, Urine 01/12/2024 1-5  1-5, NONE /HPF Final    RBC, Urine 01/12/2024 6-10 (A)  NONE, 1-2, 3-5 /HPF Final    Mucus, Urine 01/12/2024 4+  Reference range not established. /LPF Final    Calcium Oxalate Crystals, Urine 01/12/2024 3+ (A)  NONE, 1+ /HPF Final    Thyroxine, Free 01/12/2024 1.12  0.78 - 1.48 ng/dL Final   Hospital Outpatient Visit on 01/12/2024   Component Date Value Ref Range Status    Treatment Site 01/12/2024 Hip_R   Final    Course Number 01/12/2024 1   Final    Prescribed Fractional Dose 01/12/2024 200  cGray Final    Prescribed Total Dose 01/12/2024 5,000  cGray Final    Actual Fractions Delivered 01/12/2024 18   Final    Prescription Pattern Comment 01/12/2024 Extend CBCT ensure all CTV in field   Final    Actual Session Delivered Dose 01/12/2024 200  cGray Final    Actual Total Dose 01/12/2024 3,600  cGray Final    Prescribed Technique 01/12/2024 VMAT   Final     Elapsed Days 01/12/2024 25   Final    Start Date 01/12/2024 12/18/2023   Final    Last Date 01/12/2024 1/12/2024   Final    Prescribed Number of Fractions 01/12/2024 25   Final   Hospital Outpatient Visit on 01/11/2024   Component Date Value Ref Range Status    Treatment Site 01/11/2024 Hip_R   Final    Course Number 01/11/2024 1   Final    Prescribed Fractional Dose 01/11/2024 200  cGray Final    Prescribed Total Dose 01/11/2024 5,000  cGray Final    Actual Fractions Delivered 01/11/2024 17   Final    Prescription Pattern Comment 01/11/2024 Extend CBCT ensure all CTV in field   Final    Actual Session Delivered Dose 01/11/2024 200  cGray Final    Actual Total Dose 01/11/2024 3,400  cGray Final    Prescribed Technique 01/11/2024 VMAT   Final    Elapsed Days 01/11/2024 24   Final    Start Date 01/11/2024 12/18/2023   Final    Last Date 01/11/2024 1/11/2024   Final    Prescribed Number of Fractions 01/11/2024 25   Final   Hospital Outpatient Visit on 01/10/2024   Component Date Value Ref Range Status    Treatment Site 01/10/2024 Hip_R   Final    Course Number 01/10/2024 1   Final    Prescribed Fractional Dose 01/10/2024 200  cGray Final    Prescribed Total Dose 01/10/2024 5,000  cGray Final    Actual Fractions Delivered 01/10/2024 16   Final    Prescription Pattern Comment 01/10/2024 Extend CBCT ensure all CTV in field   Final    Actual Session Delivered Dose 01/10/2024 200  cGray Final    Actual Total Dose 01/10/2024 3,200  cGray Final    Prescribed Technique 01/10/2024 VMAT   Final    Elapsed Days 01/10/2024 23   Final    Start Date 01/10/2024 12/18/2023   Final    Last Date 01/10/2024 1/10/2024   Final    Prescribed Number of Fractions 01/10/2024 25   Final   Hospital Outpatient Visit on 01/09/2024   Component Date Value Ref Range Status    Treatment Site 01/09/2024 Hip_R   Final    Course Number 01/09/2024 1   Final    Prescribed Fractional Dose 01/09/2024 200  cGray Final    Prescribed Total Dose 01/09/2024 5,000   cGray Final    Actual Fractions Delivered 01/09/2024 15   Final    Prescription Pattern Comment 01/09/2024 Extend CBCT ensure all CTV in field   Final    Actual Session Delivered Dose 01/09/2024 200  cGray Final    Actual Total Dose 01/09/2024 3,000  cGray Final    Prescribed Technique 01/09/2024 VMAT   Final    Elapsed Days 01/09/2024 22   Final    Start Date 01/09/2024 12/18/2023   Final    Last Date 01/09/2024 1/9/2024   Final    Prescribed Number of Fractions 01/09/2024 25   Final   Lab on 01/09/2024   Component Date Value Ref Range Status    Thyroid Stimulating Hormone 01/09/2024 4.17 (H)  0.44 - 3.98 mIU/L Final    LDH 01/09/2024 242  84 - 246 U/L Final    Glucose 01/09/2024 169 (H)  74 - 99 mg/dL Final    Sodium 01/09/2024 142  136 - 145 mmol/L Final    Potassium 01/09/2024 3.8  3.5 - 5.3 mmol/L Final    Chloride 01/09/2024 109 (H)  98 - 107 mmol/L Final    Bicarbonate 01/09/2024 21  21 - 32 mmol/L Final    Anion Gap 01/09/2024 16  10 - 20 mmol/L Final    Urea Nitrogen 01/09/2024 20  6 - 23 mg/dL Final    Creatinine 01/09/2024 0.98  0.50 - 1.30 mg/dL Final    eGFR 01/09/2024 87  >60 mL/min/1.73m*2 Final    Calcium 01/09/2024 9.1  8.6 - 10.3 mg/dL Final    Albumin 01/09/2024 4.3  3.4 - 5.0 g/dL Final    Alkaline Phosphatase 01/09/2024 87  33 - 136 U/L Final    Total Protein 01/09/2024 7.1  6.4 - 8.2 g/dL Final    AST 01/09/2024 136 (H)  9 - 39 U/L Final    Bilirubin, Total 01/09/2024 1.1  0.0 - 1.2 mg/dL Final    ALT 01/09/2024 222 (H)  10 - 52 U/L Final    WBC 01/09/2024 4.7  4.4 - 11.3 x10*3/uL Final    nRBC 01/09/2024 0.0  0.0 - 0.0 /100 WBCs Final    RBC 01/09/2024 5.78  4.50 - 5.90 x10*6/uL Final    Hemoglobin 01/09/2024 15.6  13.5 - 17.5 g/dL Final    Hematocrit 01/09/2024 44.9  41.0 - 52.0 % Final    MCV 01/09/2024 78 (L)  80 - 100 fL Final    MCH 01/09/2024 27.0  26.0 - 34.0 pg Final    MCHC 01/09/2024 34.7  32.0 - 36.0 g/dL Final    RDW 01/09/2024 13.2  11.5 - 14.5 % Final    Platelets 01/09/2024 168   150 - 450 x10*3/uL Final    Neutrophils % 01/09/2024 60.5  40.0 - 80.0 % Final    Immature Granulocytes %, Automated 01/09/2024 0.0  0.0 - 0.9 % Final    Lymphocytes % 01/09/2024 19.5  13.0 - 44.0 % Final    Monocytes % 01/09/2024 10.4  2.0 - 10.0 % Final    Eosinophils % 01/09/2024 8.1  0.0 - 6.0 % Final    Basophils % 01/09/2024 1.5  0.0 - 2.0 % Final    Neutrophils Absolute 01/09/2024 2.86  1.20 - 7.70 x10*3/uL Final    Immature Granulocytes Absolute, Au* 01/09/2024 0.00  0.00 - 0.70 x10*3/uL Final    Lymphocytes Absolute 01/09/2024 0.92 (L)  1.20 - 4.80 x10*3/uL Final    Monocytes Absolute 01/09/2024 0.49  0.10 - 1.00 x10*3/uL Final    Eosinophils Absolute 01/09/2024 0.38  0.00 - 0.70 x10*3/uL Final    Basophils Absolute 01/09/2024 0.07  0.00 - 0.10 x10*3/uL Final    Color, Urine 01/09/2024 Bernadette (N)  Straw, Yellow Final    Appearance, Urine 01/09/2024 Hazy (N)  Clear Final    Specific Gravity, Urine 01/09/2024 1.025  1.005 - 1.035 Final    pH, Urine 01/09/2024 5.0  5.0, 5.5, 6.0, 6.5, 7.0, 7.5, 8.0 Final    Protein, Urine 01/09/2024 30 (1+) (N)  NEGATIVE mg/dL Final    Glucose, Urine 01/09/2024 NEGATIVE  NEGATIVE mg/dL Final    Blood, Urine 01/09/2024 LARGE (3+) (A)  NEGATIVE Final    Ketones, Urine 01/09/2024 5 (TRACE) (A)  NEGATIVE mg/dL Final    Bilirubin, Urine 01/09/2024 NEGATIVE  NEGATIVE Final    Urobilinogen, Urine 01/09/2024 2.0 (N)  <2.0 mg/dL Final    Nitrite, Urine 01/09/2024 NEGATIVE  NEGATIVE Final    Leukocyte Esterase, Urine 01/09/2024 NEGATIVE  NEGATIVE Final    Thyroxine, Free 01/09/2024 1.17  0.78 - 1.48 ng/dL Final    WBC, Urine 01/09/2024 6-10 (A)  1-5, NONE /HPF Final    RBC, Urine 01/09/2024 >20 (A)  NONE, 1-2, 3-5 /HPF Final    Mucus, Urine 01/09/2024 4+  Reference range not established. /LPF Final    Hyaline Casts, Urine 01/09/2024 1+ (A)  NONE /LPF Final   Hospital Outpatient Visit on 01/08/2024   Component Date Value Ref Range Status    Treatment Site 01/08/2024 Hip_R   Final     Course Number 01/08/2024 1   Final    Prescribed Fractional Dose 01/08/2024 200  cGray Final    Prescribed Total Dose 01/08/2024 5,000  cGray Final    Actual Fractions Delivered 01/08/2024 14   Final    Prescription Pattern Comment 01/08/2024 Extend CBCT ensure all CTV in field   Final    Actual Session Delivered Dose 01/08/2024 200  cGray Final    Actual Total Dose 01/08/2024 2,800  cGray Final    Prescribed Technique 01/08/2024 VMAT   Final    Elapsed Days 01/08/2024 21   Final    Start Date 01/08/2024 12/18/2023   Final    Last Date 01/08/2024 1/8/2024   Final    Prescribed Number of Fractions 01/08/2024 25   Final   Hospital Outpatient Visit on 01/05/2024   Component Date Value Ref Range Status    Treatment Site 01/05/2024 Hip_R   Final    Course Number 01/05/2024 1   Final    Prescribed Fractional Dose 01/05/2024 200  cGray Final    Prescribed Total Dose 01/05/2024 5,000  cGray Final    Actual Fractions Delivered 01/05/2024 13   Final    Prescription Pattern Comment 01/05/2024 Extend CBCT ensure all CTV in field   Final    Actual Session Delivered Dose 01/05/2024 200  cGray Final    Actual Total Dose 01/05/2024 2,600  cGray Final    Prescribed Technique 01/05/2024 VMAT   Final    Elapsed Days 01/05/2024 18   Final    Start Date 01/05/2024 12/18/2023   Final    Last Date 01/05/2024 1/5/2024   Final    Prescribed Number of Fractions 01/05/2024 25   Final   Hospital Outpatient Visit on 01/04/2024   Component Date Value Ref Range Status    Treatment Site 01/04/2024 Hip_R   Final    Course Number 01/04/2024 1   Final    Prescribed Fractional Dose 01/04/2024 200  cGray Final    Prescribed Total Dose 01/04/2024 5,000  cGray Final    Actual Fractions Delivered 01/04/2024 12   Final    Prescription Pattern Comment 01/04/2024 Extend CBCT ensure all CTV in field   Final    Actual Session Delivered Dose 01/04/2024 200  cGray Final    Actual Total Dose 01/04/2024 2,400  cGray Final    Prescribed Technique 01/04/2024 VMAT    Final    Elapsed Days 01/04/2024 17   Final    Start Date 01/04/2024 12/18/2023   Final    Last Date 01/04/2024 1/4/2024   Final    Prescribed Number of Fractions 01/04/2024 25   Final   Hospital Outpatient Visit on 01/03/2024   Component Date Value Ref Range Status    Treatment Site 01/03/2024 Hip_R   Final    Course Number 01/03/2024 1   Final    Prescribed Fractional Dose 01/03/2024 200  cGray Final    Prescribed Total Dose 01/03/2024 5,000  cGray Final    Actual Fractions Delivered 01/03/2024 11   Final    Prescription Pattern Comment 01/03/2024 Extend CBCT ensure all CTV in field   Final    Actual Session Delivered Dose 01/03/2024 200  cGray Final    Actual Total Dose 01/03/2024 2,200  cGray Final    Prescribed Technique 01/03/2024 VMAT   Final    Elapsed Days 01/03/2024 16   Final    Start Date 01/03/2024 12/18/2023   Final    Last Date 01/03/2024 1/3/2024   Final    Prescribed Number of Fractions 01/03/2024 25   Final   There may be more visits with results that are not included.         Radiology Result:  I have reviewed the latest Imaging in PACS and the findings are noted in this note. I discussed the results of the latest imaging with the patient. All previous imaging were reviewed at the time it was completed. Full records are available in the EMR for review as well.     CT CHEST WO IV CONTRAST;  11/17/2023 11:32 am  IMPRESSION:  1.  No evidence of intrathoracic metastatic disease  ==========================================================  MRI of the pelvis with and without IV contrast;  9/29/2023 10:23 am   IMPRESSION:  Complex, heterogeneously enhancing, partially necrotic intramuscular  right gluteus una mass. Imaging characteristics are aggressive  with underlying malignant lesion high in the differential. Recommend  orthopedic oncology consultation and image guided tissue sampling for  further evaluation.     Pathology Results:  I have reviewed the full pathology report recorded in the EMR. The  pertinent portions indicating diagnosis are listed here in the note. for details please refer to the full report recorded in the EMR.    Collected 11/13/2023 09:57    Status: Final result    Right gluteal mass, biopsy:  -- Extraskeletal myxoid chondrosarcoma; see note and synoptic report.  -- Immunohistochemical stains performed on formalin-fixed, paraffin embedded sections demonstrate neoplastic cells to be positive for S100 and INI1 (retained nuclear expression), and negative for EMA, AE1/AE3 and synaptophysin.     Note: Sarcoma fusion NGS panel performed at [facility] shows the presence of a [id number] gene fusion, supporting the above diagnosis.     Assessment and Plan:   Assessment/Plan   Mr. Jason Hollins is a 63 y.o. male with a diagnosis of localized sarcoma. Please see the evolution of the case listed above in the cancer history.     Mr. Hollins started RT on 12/18/23. He increased Pazopanib to 800mg PO daily on 12/20/23. He also had elevation in the blood pressure to a maximum of 150/91 mmHg. He was started on Nifedipine ER 30mg PO daily on 12/19/23. Blood pressures have been 120-130/80's.      Plan:  - Continue Pazopanib start at 800mg po daily. Blood  improving on UA. Liver enzymes also improving. We added Prednisone 5mg PO daily.   - Blood test on 01/15/2024   - Phone visit 01/16/2024.  - Continue Nifedipine ER 30mg PO daily.   - Continue to check BP at home and keep a log of the readings.  - Last day of radiation 01/23/2024.   - Scans 01/31/2024.  - Follow up with Dr. Hawkins 02/07/2024.     DISCLAIMER:   In preparing for this visit and writing this note, I reviewed all the previous electronic medical records (labs, imaging and medical charts) of the patient available in the physician portal. Significant findings which helped in decision making are recorded  in this chart.     The plan was discussed with the patient. We gave him ample opportunities to ask questions. All questions were answered to his  satisfaction and he verbalized understanding.      INSTRUCTIONS FOR PATIENT  Mr. Jason Hollins ,  It was a pleasure talking to you today.    As discussed, please continue Pazopanib 800mg by mouth daily. Please also continue Nifedipine at 30mg by mouth daily and check your blood pressure daily. Please have labs 01/15/2024. I will call you with results.     Instructions to take Pazopanib:  - Take 4 pills together (1 pill at a time). Do not crush the pill. Swallow as a whole tablet.   - Take 1 hour prior to meal or 2 hours after meal.   - Please do not drink or eat Grapefruit, [place] oranges, Pomelo and Star fruit (and their juice), while you are on this medication.  - If you are taking Pantoprazole, Omeprazole, Lansoprazole or Esmoprazole, then please stop these medicines while you are on Pazopanib.   - Monitor your blood pressure at home. If systolic BP goes above 150mmHg or diastolic BP goes above 94mmHg, then give us a call.   - Every physician/nurse, especially surgeons should know that you are on Pazopanib. Surgeries are CONTRAINDICATED when you are taking pazopanib. Please let your surgeon talk to us before they plan surgery for you.    Please make sure to schedule your appointments as we discussed. Your appointments should also appear in your MyChart.   In case of an emergency please dial 911 or report to your nearest Emergency Room.  For all other questions, please do not hesitate to reach out to us at the number listed below.    Thank you for choosing [facility] at [facility].   We appreciate your visit.     MD Siomara Hernandez APRN Taylor Costello, RN ([facility] location)  Maribel Jaffe RN ([facility])    Sarcoma and Cutaneous Oncology team    Phone: [phone]  Fax: [phone].

## 2024-01-15 ENCOUNTER — HOSPITAL ENCOUNTER (OUTPATIENT)
Dept: RADIATION ONCOLOGY | Facility: CLINIC | Age: 64
Setting detail: RADIATION/ONCOLOGY SERIES
Discharge: HOME | End: 2024-01-15
Payer: COMMERCIAL

## 2024-01-15 ENCOUNTER — LAB (OUTPATIENT)
Dept: LAB | Facility: CLINIC | Age: 64
End: 2024-01-15
Payer: COMMERCIAL

## 2024-01-15 DIAGNOSIS — Z51.0 ENCOUNTER FOR ANTINEOPLASTIC RADIATION THERAPY: ICD-10-CM

## 2024-01-15 DIAGNOSIS — Z79.899 ENCOUNTER FOR MONITORING CARDIOTOXIC DRUG THERAPY: ICD-10-CM

## 2024-01-15 DIAGNOSIS — C49.21 MALIGNANT NEOPLASM OF CONNECTIVE AND SOFT TISSUE OF RIGHT LOWER LIMB, INCLUDING HIP (MULTI): ICD-10-CM

## 2024-01-15 DIAGNOSIS — C49.9 SOFT TISSUE SARCOMA (MULTI): ICD-10-CM

## 2024-01-15 DIAGNOSIS — Z51.81 ENCOUNTER FOR MONITORING CARDIOTOXIC DRUG THERAPY: ICD-10-CM

## 2024-01-15 DIAGNOSIS — R53.0 NEOPLASTIC MALIGNANT RELATED FATIGUE: ICD-10-CM

## 2024-01-15 LAB
ALBUMIN SERPL BCP-MCNC: 4.2 G/DL (ref 3.4–5)
ALP SERPL-CCNC: 85 U/L (ref 33–136)
ALT SERPL W P-5'-P-CCNC: 175 U/L (ref 10–52)
ANION GAP SERPL CALC-SCNC: 14 MMOL/L (ref 10–20)
APPEARANCE UR: ABNORMAL
AST SERPL W P-5'-P-CCNC: 82 U/L (ref 9–39)
BASOPHILS # BLD AUTO: 0.05 X10*3/UL (ref 0–0.1)
BASOPHILS NFR BLD AUTO: 1.1 %
BILIRUB SERPL-MCNC: 1 MG/DL (ref 0–1.2)
BILIRUB UR STRIP.AUTO-MCNC: NEGATIVE MG/DL
BUN SERPL-MCNC: 17 MG/DL (ref 6–23)
CALCIUM SERPL-MCNC: 8.9 MG/DL (ref 8.6–10.3)
CHLORIDE SERPL-SCNC: 110 MMOL/L (ref 98–107)
CO2 SERPL-SCNC: 25 MMOL/L (ref 21–32)
COLOR UR: YELLOW
CREAT SERPL-MCNC: 0.86 MG/DL (ref 0.5–1.3)
EGFRCR SERPLBLD CKD-EPI 2021: >90 ML/MIN/1.73M*2
EOSINOPHIL # BLD AUTO: 0.22 X10*3/UL (ref 0–0.7)
EOSINOPHIL NFR BLD AUTO: 4.9 %
ERYTHROCYTE [DISTWIDTH] IN BLOOD BY AUTOMATED COUNT: 13.9 % (ref 11.5–14.5)
GLUCOSE SERPL-MCNC: 176 MG/DL (ref 74–99)
GLUCOSE UR STRIP.AUTO-MCNC: ABNORMAL MG/DL
HCT VFR BLD AUTO: 43.7 % (ref 41–52)
HGB BLD-MCNC: 14.7 G/DL (ref 13.5–17.5)
IMM GRANULOCYTES # BLD AUTO: 0 X10*3/UL (ref 0–0.7)
IMM GRANULOCYTES NFR BLD AUTO: 0 % (ref 0–0.9)
KETONES UR STRIP.AUTO-MCNC: ABNORMAL MG/DL
LDH SERPL L TO P-CCNC: 177 U/L (ref 84–246)
LEUKOCYTE ESTERASE UR QL STRIP.AUTO: NEGATIVE
LYMPHOCYTES # BLD AUTO: 0.89 X10*3/UL (ref 1.2–4.8)
LYMPHOCYTES NFR BLD AUTO: 19.9 %
MCH RBC QN AUTO: 26.8 PG (ref 26–34)
MCHC RBC AUTO-ENTMCNC: 33.6 G/DL (ref 32–36)
MCV RBC AUTO: 80 FL (ref 80–100)
MONOCYTES # BLD AUTO: 0.42 X10*3/UL (ref 0.1–1)
MONOCYTES NFR BLD AUTO: 9.4 %
MUCOUS THREADS #/AREA URNS AUTO: NORMAL /LPF
NEUTROPHILS # BLD AUTO: 2.89 X10*3/UL (ref 1.2–7.7)
NEUTROPHILS NFR BLD AUTO: 64.7 %
NITRITE UR QL STRIP.AUTO: NEGATIVE
NRBC BLD-RTO: 0 /100 WBCS (ref 0–0)
PH UR STRIP.AUTO: 5 [PH]
PLATELET # BLD AUTO: 176 X10*3/UL (ref 150–450)
POTASSIUM SERPL-SCNC: 3.7 MMOL/L (ref 3.5–5.3)
PROT SERPL-MCNC: 6.9 G/DL (ref 6.4–8.2)
PROT UR STRIP.AUTO-MCNC: ABNORMAL MG/DL
RAD ONC MSQ ACTUAL FRACTIONS DELIVERED: 19
RAD ONC MSQ ACTUAL SESSION DELIVERED DOSE: 200 CGRAY
RAD ONC MSQ ACTUAL TOTAL DOSE: 3800 CGRAY
RAD ONC MSQ ELAPSED DAYS: 28
RAD ONC MSQ LAST DATE: NORMAL
RAD ONC MSQ PRESCRIBED FRACTIONAL DOSE: 200 CGRAY
RAD ONC MSQ PRESCRIBED NUMBER OF FRACTIONS: 25
RAD ONC MSQ PRESCRIBED TECHNIQUE: NORMAL
RAD ONC MSQ PRESCRIBED TOTAL DOSE: 5000 CGRAY
RAD ONC MSQ PRESCRIPTION PATTERN COMMENT: NORMAL
RAD ONC MSQ START DATE: NORMAL
RAD ONC MSQ TREATMENT COURSE NUMBER: 1
RAD ONC MSQ TREATMENT SITE: NORMAL
RBC # BLD AUTO: 5.48 X10*6/UL (ref 4.5–5.9)
RBC # UR STRIP.AUTO: NEGATIVE /UL
RBC #/AREA URNS AUTO: NORMAL /HPF
SODIUM SERPL-SCNC: 145 MMOL/L (ref 136–145)
SP GR UR STRIP.AUTO: 1.02
T4 FREE SERPL-MCNC: 1.04 NG/DL (ref 0.78–1.48)
TSH SERPL-ACNC: 4.25 MIU/L (ref 0.44–3.98)
UROBILINOGEN UR STRIP.AUTO-MCNC: 2 MG/DL
WBC # BLD AUTO: 4.5 X10*3/UL (ref 4.4–11.3)
WBC #/AREA URNS AUTO: NORMAL /HPF

## 2024-01-15 PROCEDURE — 80053 COMPREHEN METABOLIC PANEL: CPT

## 2024-01-15 PROCEDURE — 36415 COLL VENOUS BLD VENIPUNCTURE: CPT

## 2024-01-15 PROCEDURE — 77386 HC INTENSITY-MODULATED RADIATION THERAPY (IMRT), COMPLEX: CPT | Performed by: STUDENT IN AN ORGANIZED HEALTH CARE EDUCATION/TRAINING PROGRAM

## 2024-01-15 PROCEDURE — 84439 ASSAY OF FREE THYROXINE: CPT

## 2024-01-15 PROCEDURE — 83615 LACTATE (LD) (LDH) ENZYME: CPT

## 2024-01-15 PROCEDURE — 85025 COMPLETE CBC W/AUTO DIFF WBC: CPT

## 2024-01-15 PROCEDURE — 77014 CHG CT GUIDANCE RADIATION THERAPY FLDS PLACEMENT: CPT | Performed by: STUDENT IN AN ORGANIZED HEALTH CARE EDUCATION/TRAINING PROGRAM

## 2024-01-15 PROCEDURE — 81001 URINALYSIS AUTO W/SCOPE: CPT

## 2024-01-15 PROCEDURE — 84443 ASSAY THYROID STIM HORMONE: CPT

## 2024-01-16 ENCOUNTER — OFFICE VISIT (OUTPATIENT)
Dept: HEMATOLOGY/ONCOLOGY | Facility: HOSPITAL | Age: 64
End: 2024-01-16
Payer: COMMERCIAL

## 2024-01-16 ENCOUNTER — HOSPITAL ENCOUNTER (OUTPATIENT)
Dept: RADIATION ONCOLOGY | Facility: CLINIC | Age: 64
Setting detail: RADIATION/ONCOLOGY SERIES
Discharge: HOME | End: 2024-01-16
Payer: COMMERCIAL

## 2024-01-16 DIAGNOSIS — R53.0 NEOPLASTIC MALIGNANT RELATED FATIGUE: ICD-10-CM

## 2024-01-16 DIAGNOSIS — Z79.899 ENCOUNTER FOR MONITORING CARDIOTOXIC DRUG THERAPY: ICD-10-CM

## 2024-01-16 DIAGNOSIS — Z51.81 ENCOUNTER FOR MONITORING CARDIOTOXIC DRUG THERAPY: ICD-10-CM

## 2024-01-16 DIAGNOSIS — C49.9 SOFT TISSUE SARCOMA (MULTI): ICD-10-CM

## 2024-01-16 DIAGNOSIS — Z51.0 ENCOUNTER FOR ANTINEOPLASTIC RADIATION THERAPY: ICD-10-CM

## 2024-01-16 DIAGNOSIS — C49.21 MALIGNANT NEOPLASM OF CONNECTIVE AND SOFT TISSUE OF RIGHT LOWER LIMB, INCLUDING HIP (MULTI): ICD-10-CM

## 2024-01-16 LAB
RAD ONC MSQ ACTUAL FRACTIONS DELIVERED: 20
RAD ONC MSQ ACTUAL SESSION DELIVERED DOSE: 200 CGRAY
RAD ONC MSQ ACTUAL TOTAL DOSE: 4000 CGRAY
RAD ONC MSQ ELAPSED DAYS: 29
RAD ONC MSQ LAST DATE: NORMAL
RAD ONC MSQ PRESCRIBED FRACTIONAL DOSE: 200 CGRAY
RAD ONC MSQ PRESCRIBED NUMBER OF FRACTIONS: 25
RAD ONC MSQ PRESCRIBED TECHNIQUE: NORMAL
RAD ONC MSQ PRESCRIBED TOTAL DOSE: 5000 CGRAY
RAD ONC MSQ PRESCRIPTION PATTERN COMMENT: NORMAL
RAD ONC MSQ START DATE: NORMAL
RAD ONC MSQ TREATMENT COURSE NUMBER: 1
RAD ONC MSQ TREATMENT SITE: NORMAL

## 2024-01-16 PROCEDURE — 1036F TOBACCO NON-USER: CPT | Performed by: NURSE PRACTITIONER

## 2024-01-16 PROCEDURE — 77336 RADIATION PHYSICS CONSULT: CPT | Performed by: STUDENT IN AN ORGANIZED HEALTH CARE EDUCATION/TRAINING PROGRAM

## 2024-01-16 PROCEDURE — 99443 PR PHYS/QHP TELEPHONE EVALUATION 21-30 MIN: CPT | Performed by: NURSE PRACTITIONER

## 2024-01-16 PROCEDURE — 3008F BODY MASS INDEX DOCD: CPT | Performed by: NURSE PRACTITIONER

## 2024-01-16 PROCEDURE — 77014 CHG CT GUIDANCE RADIATION THERAPY FLDS PLACEMENT: CPT | Performed by: STUDENT IN AN ORGANIZED HEALTH CARE EDUCATION/TRAINING PROGRAM

## 2024-01-16 PROCEDURE — 77386 HC INTENSITY-MODULATED RADIATION THERAPY (IMRT), COMPLEX: CPT | Performed by: STUDENT IN AN ORGANIZED HEALTH CARE EDUCATION/TRAINING PROGRAM

## 2024-01-16 RX ORDER — PREDNISONE 5 MG/1
5 TABLET ORAL DAILY
COMMUNITY
End: 2024-01-23 | Stop reason: ALTCHOICE

## 2024-01-16 ASSESSMENT — ENCOUNTER SYMPTOMS
DIARRHEA: 1
PSYCHIATRIC NEGATIVE: 1
EYES NEGATIVE: 1
HEMATOLOGIC/LYMPHATIC NEGATIVE: 1
CONSTITUTIONAL NEGATIVE: 1
MUSCULOSKELETAL NEGATIVE: 1
CARDIOVASCULAR NEGATIVE: 1
ENDOCRINE NEGATIVE: 1
NEUROLOGICAL NEGATIVE: 1
RESPIRATORY NEGATIVE: 1

## 2024-01-16 NOTE — PROGRESS NOTES
".Patient ID: Jason Hollins is a 63 y.o. male.  Referring Physician: No referring provider defined for this encounter.  Primary Care Provider: PHILLIP Gregory-CNP     Oncology follow up    HPI  Mr. Jason Hollins is referred by Dr. 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 He met with our team on 11/29/23.      Mr. Hollins first noticed a mass in the right buttock in June 2023. This was eventually investigated by his PCP and he was found to have a mass. MRI on 09/20/23 reported a 14.9 x 12.7 x 8.3 cm predominantly T2 hyperintense, T1 hypointense, heterogeneously enhancing, encapsulated intramuscular solid lesion in the right gluteus una muscle. He was referred to Dr. Mikal Hawkins, whom he met on 10/25/23. CT guided biopsy of the mass was pursued on 11/13/2023. The pathology slides were discussed in the tumor board and a diagnosis of sarcoma was rendered. He was referred to Radiation Oncology and Medical Oncology for planning pre-operative radiation and neoadjuvant pazopanib.     He met us on 11/29/23 and we prescribed the pazopanib. He received this medicine on 12/11/23 and he started pazopanib at 400mg on 12/14/23. His labs were within normal limits on 12/18/23 and we increased the dose to 800mg on 12/20/23. We also added nifedipine ER 30mg on 12/19/23 for Grade 1 HTN (BP highest read- 150/91 mmHg)     Systemic Treatment:  A Pazopanib concurrent with Radiation.   Started on 12/14/23 at 400mg PO daily. Increased to 800mg PO daily on 12/20/23.  Radiation started on 12/18/23    Subjective   Please refer to \"Notes/Cancer History\" above for complete History of present illness.     Mr Jason Hollins     - Overall feels well. No changes from last week,   - Checking BP at home. 120-130/80-90's. Highest diastolic was 92, but on recheck was in the 80's.   - Diarrhea a couple times daily. Also depends on what he is eating.   - Scant amount of blood when he blows his nose. Doesn't happen " all the time. No clots.  - No bleeding in urine or stool that he can see.      Review of Systems:   Review of Systems   Constitutional: Negative.    HENT:  Negative.     Eyes: Negative.    Respiratory: Negative.     Cardiovascular: Negative.    Gastrointestinal:  Positive for diarrhea.        Couple times a day if he eats a lot   Endocrine: Negative.    Genitourinary: Negative.     Musculoskeletal: Negative.    Skin: Negative.    Neurological: Negative.    Hematological: Negative.    Psychiatric/Behavioral: Negative.           MEDICAL HISTORY  Past Medical History:   Diagnosis Date    Morbid (severe) obesity due to excess calories (CMS/[facility])     Severe obesity (BMI 35.0-35.9 with comorbidity)    Morbid (severe) obesity due to excess calories (CMS/[facility]CC) 05/31/2016    Severe obesity (BMI 35.0-39.9) with comorbidity    Personal history of other endocrine, nutritional and metabolic disease     History of diabetes mellitus    Personal history of other endocrine, nutritional and metabolic disease     History of hyperlipidemia       FAMILY HISTORY  Family History   Problem Relation Name Age of Onset    Coronary artery disease Mother      Diabetes Mother      Hypertension Mother      Cancer Father      Diabetes Other Grandmother        TOBACCO HISTORY  Tobacco Use: Low Risk  (1/5/2024)    Patient History     Smoking Tobacco Use: Never     Smokeless Tobacco Use: Never     Passive Exposure: Not on file       SOCIAL HISTORY  Social Connections: Not on file   . Lives with his wife. Has 2 children. Working full time.      Outpatient Medication Profile:  Current Outpatient Medications on File Prior to Visit   Medication Sig Dispense Refill    aspirin 81 mg EC tablet Take 1 tablet (81 mg) by mouth once daily. AS DIRECTED.      atorvastatin (Lipitor) 40 mg tablet Take 1 tablet (40 mg) by mouth once daily. 90 tablet 1    blood sugar diagnostic (Blood Glucose Test) strip once daily. One Drop Test In Vitro Strip; Test       dulaglutide (Trulicity) 4.5 mg/0.5 mL pen injector Inject 4.5 mg under the skin 1 (one) time per week. 2 mL 2    dulaglutide (TRULICITY) 4.5 mg/0.5 mL pen injector INJECT ONE PEN (4.5 MG) UNDER THE SKIN ONCE WEEKLY 2 mL 2    ergocalciferol (Vitamin D-2) 1.25 MG (50421 UT) capsule Take 1 capsule (50,000 Units) by mouth every 14 (fourteen) days. twice monthly on the 15th and the 30 th for vitamin d deficiency 6 capsule 1    fish oil concentrate (Omega-3) 120-180 mg capsule Take 3 capsules (3,000 mg) by mouth.      gabapentin (Neurontin) 100 mg capsule Take one capsule and increase by 1 capsule every 4 days until taking 3 capsules 90 capsule 0    gabapentin (Neurontin) 300 mg capsule Use the 100 mg capsule to titrate to 300 mg , then use the 300 mg capsule and take 1 to 2 capsules hs for pain 90 capsule 2    ibuprofen 800 mg tablet Take 1 tablet (800 mg) by mouth 3 times a day as needed for mild pain (1 - 3) (pain). 180 tablet 3    metFORMIN (Glucophage) 1,000 mg tablet TAKE 1 TABLET BY MOUTH EVERY MORNING AND 1.5 TABLETS BY MOUTH EVERY EVENING , 225 tablet 0    multivitamin tablet Take 1 tablet by mouth once daily.      NIFEdipine ER (NIFEdipine XL) 30 mg 24 hr tablet Take 1 tablet (30 mg) by mouth once daily in the morning. Take before meals. Do not crush, chew, or split. 90 tablet 3    OneTouch Ultra Test strip Test blood sugar once daily 100 strip 3    PAZOPanib (Votrient) 200 mg tablet Take 4 tablets (800 mg total) by mouth once daily.  Take on an empty stomach, at least 1 hour before or 2 hours after a meal. 112 tablet 3    predniSONE (Deltasone) 5 mg tablet Take 10 tablets (50 mg) by mouth once daily for 12 days. 12 tablet 0    sildenafil (Viagra) 100 mg tablet Take 1 tablet (100 mg) by mouth once daily as needed (1 hour before needed). 10 tablet 2    tiZANidine (Zanaflex) 4 mg capsule Take 1 capsule (4 mg) by mouth 3 times a day. 40 capsule 2     No current facility-administered medications on file prior to  visit.         Performance Status:  Asymptomatic     Vitals and Measurements:   There were no vitals taken for this visit.   Telephone visit      Physical Exam:   Physical Exam   Telephone visit       Lab Results:  I have reviewed these laboratory results:     Hospital Outpatient Visit on 01/16/2024   Component Date Value Ref Range Status    Treatment Site 01/16/2024 Hip_R   Final    Course Number 01/16/2024 1   Final    Prescribed Fractional Dose 01/16/2024 200  cGray Final    Prescribed Total Dose 01/16/2024 5,000  cGray Final    Actual Fractions Delivered 01/16/2024 20   Final    Prescription Pattern Comment 01/16/2024 Extend CBCT ensure all CTV in field   Final    Actual Session Delivered Dose 01/16/2024 200  cGray Final    Actual Total Dose 01/16/2024 4,000  cGray Final    Prescribed Technique 01/16/2024 VMAT   Final    Elapsed Days 01/16/2024 29   Final    Start Date 01/16/2024 12/18/2023   Final    Last Date 01/16/2024 1/16/2024   Final    Prescribed Number of Fractions 01/16/2024 25   Final   Hospital Outpatient Visit on 01/15/2024   Component Date Value Ref Range Status    Treatment Site 01/15/2024 Hip_R   Final    Course Number 01/15/2024 1   Final    Prescribed Fractional Dose 01/15/2024 200  cGray Final    Prescribed Total Dose 01/15/2024 5,000  cGray Final    Actual Fractions Delivered 01/15/2024 19   Final    Prescription Pattern Comment 01/15/2024 Extend CBCT ensure all CTV in field   Final    Actual Session Delivered Dose 01/15/2024 200  cGray Final    Actual Total Dose 01/15/2024 3,800  cGray Final    Prescribed Technique 01/15/2024 VMAT   Final    Elapsed Days 01/15/2024 28   Final    Start Date 01/15/2024 12/18/2023   Final    Last Date 01/15/2024 1/15/2024   Final    Prescribed Number of Fractions 01/15/2024 25   Final   Lab on 01/15/2024   Component Date Value Ref Range Status    Thyroid Stimulating Hormone 01/15/2024 4.25 (H)  0.44 - 3.98 mIU/L Final    Color, Urine 01/15/2024 Yellow  Straw,  Yellow Final    Appearance, Urine 01/15/2024 Hazy (N)  Clear Final    Specific Gravity, Urine 01/15/2024 1.025  1.005 - 1.035 Final    pH, Urine 01/15/2024 5.0  5.0, 5.5, 6.0, 6.5, 7.0, 7.5, 8.0 Final    Protein, Urine 01/15/2024 30 (1+) (N)  NEGATIVE mg/dL Final    Glucose, Urine 01/15/2024 50 (1+) (A)  NEGATIVE mg/dL Final    Blood, Urine 01/15/2024 NEGATIVE  NEGATIVE Final    Ketones, Urine 01/15/2024 5 (TRACE) (A)  NEGATIVE mg/dL Final    Bilirubin, Urine 01/15/2024 NEGATIVE  NEGATIVE Final    Urobilinogen, Urine 01/15/2024 2.0 (N)  <2.0 mg/dL Final    Nitrite, Urine 01/15/2024 NEGATIVE  NEGATIVE Final    Leukocyte Esterase, Urine 01/15/2024 NEGATIVE  NEGATIVE Final    LDH 01/15/2024 177  84 - 246 U/L Final    Glucose 01/15/2024 176 (H)  74 - 99 mg/dL Final    Sodium 01/15/2024 145  136 - 145 mmol/L Final    Potassium 01/15/2024 3.7  3.5 - 5.3 mmol/L Final    Chloride 01/15/2024 110 (H)  98 - 107 mmol/L Final    Bicarbonate 01/15/2024 25  21 - 32 mmol/L Final    Anion Gap 01/15/2024 14  10 - 20 mmol/L Final    Urea Nitrogen 01/15/2024 17  6 - 23 mg/dL Final    Creatinine 01/15/2024 0.86  0.50 - 1.30 mg/dL Final    eGFR 01/15/2024 >90  >60 mL/min/1.73m*2 Final    Calcium 01/15/2024 8.9  8.6 - 10.3 mg/dL Final    Albumin 01/15/2024 4.2  3.4 - 5.0 g/dL Final    Alkaline Phosphatase 01/15/2024 85  33 - 136 U/L Final    Total Protein 01/15/2024 6.9  6.4 - 8.2 g/dL Final    AST 01/15/2024 82 (H)  9 - 39 U/L Final    Bilirubin, Total 01/15/2024 1.0  0.0 - 1.2 mg/dL Final    ALT 01/15/2024 175 (H)  10 - 52 U/L Final    WBC 01/15/2024 4.5  4.4 - 11.3 x10*3/uL Final    nRBC 01/15/2024 0.0  0.0 - 0.0 /100 WBCs Final    RBC 01/15/2024 5.48  4.50 - 5.90 x10*6/uL Final    Hemoglobin 01/15/2024 14.7  13.5 - 17.5 g/dL Final    Hematocrit 01/15/2024 43.7  41.0 - 52.0 % Final    MCV 01/15/2024 80  80 - 100 fL Final    MCH 01/15/2024 26.8  26.0 - 34.0 pg Final    MCHC 01/15/2024 33.6  32.0 - 36.0 g/dL Final    RDW 01/15/2024 13.9   11.5 - 14.5 % Final    Platelets 01/15/2024 176  150 - 450 x10*3/uL Final    Neutrophils % 01/15/2024 64.7  40.0 - 80.0 % Final    Immature Granulocytes %, Automated 01/15/2024 0.0  0.0 - 0.9 % Final    Lymphocytes % 01/15/2024 19.9  13.0 - 44.0 % Final    Monocytes % 01/15/2024 9.4  2.0 - 10.0 % Final    Eosinophils % 01/15/2024 4.9  0.0 - 6.0 % Final    Basophils % 01/15/2024 1.1  0.0 - 2.0 % Final    Neutrophils Absolute 01/15/2024 2.89  1.20 - 7.70 x10*3/uL Final    Immature Granulocytes Absolute, Au* 01/15/2024 0.00  0.00 - 0.70 x10*3/uL Final    Lymphocytes Absolute 01/15/2024 0.89 (L)  1.20 - 4.80 x10*3/uL Final    Monocytes Absolute 01/15/2024 0.42  0.10 - 1.00 x10*3/uL Final    Eosinophils Absolute 01/15/2024 0.22  0.00 - 0.70 x10*3/uL Final    Basophils Absolute 01/15/2024 0.05  0.00 - 0.10 x10*3/uL Final    WBC, Urine 01/15/2024 NONE  1-5, NONE /HPF Final    RBC, Urine 01/15/2024 NONE  NONE, 1-2, 3-5 /HPF Final    Mucus, Urine 01/15/2024 4+  Reference range not established. /LPF Final    Thyroxine, Free 01/15/2024 1.04  0.78 - 1.48 ng/dL Final   Hospital Outpatient Visit on 01/12/2024   Component Date Value Ref Range Status    Treatment Site 01/12/2024 Hip_R   Final    Course Number 01/12/2024 1   Final    Prescribed Fractional Dose 01/12/2024 200  cGray Final    Prescribed Total Dose 01/12/2024 5,000  cGray Final    Actual Fractions Delivered 01/12/2024 18   Final    Prescription Pattern Comment 01/12/2024 Extend CBCT ensure all CTV in field   Final    Actual Session Delivered Dose 01/12/2024 200  cGray Final    Actual Total Dose 01/12/2024 3,600  cGray Final    Prescribed Technique 01/12/2024 VMAT   Final    Elapsed Days 01/12/2024 25   Final    Start Date 01/12/2024 12/18/2023   Final    Last Date 01/12/2024 1/12/2024   Final    Prescribed Number of Fractions 01/12/2024 25   Final   Lab on 01/12/2024   Component Date Value Ref Range Status    Thyroid Stimulating Hormone 01/12/2024 4.24 (H)  0.44 - 3.98  mIU/L Final    Color, Urine 01/12/2024 Bernadette (N)  Straw, Yellow Final    Appearance, Urine 01/12/2024 Hazy (N)  Clear Final    Specific Gravity, Urine 01/12/2024 1.028  1.005 - 1.035 Final    pH, Urine 01/12/2024 5.0  5.0, 5.5, 6.0, 6.5, 7.0, 7.5, 8.0 Final    Protein, Urine 01/12/2024 30 (1+) (N)  NEGATIVE mg/dL Final    Glucose, Urine 01/12/2024 50 (1+) (A)  NEGATIVE mg/dL Final    Blood, Urine 01/12/2024 SMALL (1+) (A)  NEGATIVE Final    Ketones, Urine 01/12/2024 NEGATIVE  NEGATIVE mg/dL Final    Bilirubin, Urine 01/12/2024 NEGATIVE  NEGATIVE Final    Urobilinogen, Urine 01/12/2024 2.0 (N)  <2.0 mg/dL Final    Nitrite, Urine 01/12/2024 NEGATIVE  NEGATIVE Final    Leukocyte Esterase, Urine 01/12/2024 NEGATIVE  NEGATIVE Final    LDH 01/12/2024 193  84 - 246 U/L Final    Glucose 01/12/2024 158 (H)  74 - 99 mg/dL Final    Sodium 01/12/2024 143  136 - 145 mmol/L Final    Potassium 01/12/2024 3.7  3.5 - 5.3 mmol/L Final    Chloride 01/12/2024 107  98 - 107 mmol/L Final    Bicarbonate 01/12/2024 27  21 - 32 mmol/L Final    Anion Gap 01/12/2024 13  10 - 20 mmol/L Final    Urea Nitrogen 01/12/2024 18  6 - 23 mg/dL Final    Creatinine 01/12/2024 1.05  0.50 - 1.30 mg/dL Final    eGFR 01/12/2024 80  >60 mL/min/1.73m*2 Final    Calcium 01/12/2024 9.3  8.6 - 10.3 mg/dL Final    Albumin 01/12/2024 4.2  3.4 - 5.0 g/dL Final    Alkaline Phosphatase 01/12/2024 81  33 - 136 U/L Final    Total Protein 01/12/2024 7.0  6.4 - 8.2 g/dL Final    AST 01/12/2024 108 (H)  9 - 39 U/L Final    Bilirubin, Total 01/12/2024 1.1  0.0 - 1.2 mg/dL Final    ALT 01/12/2024 213 (H)  10 - 52 U/L Final    WBC 01/12/2024 3.8 (L)  4.4 - 11.3 x10*3/uL Final    nRBC 01/12/2024 0.0  0.0 - 0.0 /100 WBCs Final    RBC 01/12/2024 5.50  4.50 - 5.90 x10*6/uL Final    Hemoglobin 01/12/2024 14.8  13.5 - 17.5 g/dL Final    Hematocrit 01/12/2024 43.5  41.0 - 52.0 % Final    MCV 01/12/2024 79 (L)  80 - 100 fL Final    MCH 01/12/2024 26.9  26.0 - 34.0 pg Final    MCHC  01/12/2024 34.0  32.0 - 36.0 g/dL Final    RDW 01/12/2024 13.6  11.5 - 14.5 % Final    Platelets 01/12/2024 168  150 - 450 x10*3/uL Final    Neutrophils % 01/12/2024 59.3  40.0 - 80.0 % Final    Immature Granulocytes %, Automated 01/12/2024 0.3  0.0 - 0.9 % Final    Lymphocytes % 01/12/2024 20.5  13.0 - 44.0 % Final    Monocytes % 01/12/2024 10.2  2.0 - 10.0 % Final    Eosinophils % 01/12/2024 8.7  0.0 - 6.0 % Final    Basophils % 01/12/2024 1.0  0.0 - 2.0 % Final    Neutrophils Absolute 01/12/2024 2.26  1.20 - 7.70 x10*3/uL Final    Immature Granulocytes Absolute, Au* 01/12/2024 0.01  0.00 - 0.70 x10*3/uL Final    Lymphocytes Absolute 01/12/2024 0.78 (L)  1.20 - 4.80 x10*3/uL Final    Monocytes Absolute 01/12/2024 0.39  0.10 - 1.00 x10*3/uL Final    Eosinophils Absolute 01/12/2024 0.33  0.00 - 0.70 x10*3/uL Final    Basophils Absolute 01/12/2024 0.04  0.00 - 0.10 x10*3/uL Final    WBC, Urine 01/12/2024 1-5  1-5, NONE /HPF Final    RBC, Urine 01/12/2024 6-10 (A)  NONE, 1-2, 3-5 /HPF Final    Mucus, Urine 01/12/2024 4+  Reference range not established. /LPF Final    Calcium Oxalate Crystals, Urine 01/12/2024 3+ (A)  NONE, 1+ /HPF Final    Thyroxine, Free 01/12/2024 1.12  0.78 - 1.48 ng/dL Final   Hospital Outpatient Visit on 01/11/2024   Component Date Value Ref Range Status    Treatment Site 01/11/2024 Hip_R   Final    Course Number 01/11/2024 1   Final    Prescribed Fractional Dose 01/11/2024 200  cGray Final    Prescribed Total Dose 01/11/2024 5,000  cGray Final    Actual Fractions Delivered 01/11/2024 17   Final    Prescription Pattern Comment 01/11/2024 Extend CBCT ensure all CTV in field   Final    Actual Session Delivered Dose 01/11/2024 200  cGray Final    Actual Total Dose 01/11/2024 3,400  cGray Final    Prescribed Technique 01/11/2024 VMAT   Final    Elapsed Days 01/11/2024 24   Final    Start Date 01/11/2024 12/18/2023   Final    Last Date 01/11/2024 1/11/2024   Final    Prescribed Number of Fractions  01/11/2024 25   Final   Hospital Outpatient Visit on 01/10/2024   Component Date Value Ref Range Status    Treatment Site 01/10/2024 Hip_R   Final    Course Number 01/10/2024 1   Final    Prescribed Fractional Dose 01/10/2024 200  cGray Final    Prescribed Total Dose 01/10/2024 5,000  cGray Final    Actual Fractions Delivered 01/10/2024 16   Final    Prescription Pattern Comment 01/10/2024 Extend CBCT ensure all CTV in field   Final    Actual Session Delivered Dose 01/10/2024 200  cGray Final    Actual Total Dose 01/10/2024 3,200  cGray Final    Prescribed Technique 01/10/2024 VMAT   Final    Elapsed Days 01/10/2024 23   Final    Start Date 01/10/2024 12/18/2023   Final    Last Date 01/10/2024 1/10/2024   Final    Prescribed Number of Fractions 01/10/2024 25   Final   Hospital Outpatient Visit on 01/09/2024   Component Date Value Ref Range Status    Treatment Site 01/09/2024 Hip_R   Final    Course Number 01/09/2024 1   Final    Prescribed Fractional Dose 01/09/2024 200  cGray Final    Prescribed Total Dose 01/09/2024 5,000  cGray Final    Actual Fractions Delivered 01/09/2024 15   Final    Prescription Pattern Comment 01/09/2024 Extend CBCT ensure all CTV in field   Final    Actual Session Delivered Dose 01/09/2024 200  cGray Final    Actual Total Dose 01/09/2024 3,000  cGray Final    Prescribed Technique 01/09/2024 VMAT   Final    Elapsed Days 01/09/2024 22   Final    Start Date 01/09/2024 12/18/2023   Final    Last Date 01/09/2024 1/9/2024   Final    Prescribed Number of Fractions 01/09/2024 25   Final   Lab on 01/09/2024   Component Date Value Ref Range Status    Thyroid Stimulating Hormone 01/09/2024 4.17 (H)  0.44 - 3.98 mIU/L Final    LDH 01/09/2024 242  84 - 246 U/L Final    Glucose 01/09/2024 169 (H)  74 - 99 mg/dL Final    Sodium 01/09/2024 142  136 - 145 mmol/L Final    Potassium 01/09/2024 3.8  3.5 - 5.3 mmol/L Final    Chloride 01/09/2024 109 (H)  98 - 107 mmol/L Final    Bicarbonate 01/09/2024 21  21 -  32 mmol/L Final    Anion Gap 01/09/2024 16  10 - 20 mmol/L Final    Urea Nitrogen 01/09/2024 20  6 - 23 mg/dL Final    Creatinine 01/09/2024 0.98  0.50 - 1.30 mg/dL Final    eGFR 01/09/2024 87  >60 mL/min/1.73m*2 Final    Calcium 01/09/2024 9.1  8.6 - 10.3 mg/dL Final    Albumin 01/09/2024 4.3  3.4 - 5.0 g/dL Final    Alkaline Phosphatase 01/09/2024 87  33 - 136 U/L Final    Total Protein 01/09/2024 7.1  6.4 - 8.2 g/dL Final    AST 01/09/2024 136 (H)  9 - 39 U/L Final    Bilirubin, Total 01/09/2024 1.1  0.0 - 1.2 mg/dL Final    ALT 01/09/2024 222 (H)  10 - 52 U/L Final    WBC 01/09/2024 4.7  4.4 - 11.3 x10*3/uL Final    nRBC 01/09/2024 0.0  0.0 - 0.0 /100 WBCs Final    RBC 01/09/2024 5.78  4.50 - 5.90 x10*6/uL Final    Hemoglobin 01/09/2024 15.6  13.5 - 17.5 g/dL Final    Hematocrit 01/09/2024 44.9  41.0 - 52.0 % Final    MCV 01/09/2024 78 (L)  80 - 100 fL Final    MCH 01/09/2024 27.0  26.0 - 34.0 pg Final    MCHC 01/09/2024 34.7  32.0 - 36.0 g/dL Final    RDW 01/09/2024 13.2  11.5 - 14.5 % Final    Platelets 01/09/2024 168  150 - 450 x10*3/uL Final    Neutrophils % 01/09/2024 60.5  40.0 - 80.0 % Final    Immature Granulocytes %, Automated 01/09/2024 0.0  0.0 - 0.9 % Final    Lymphocytes % 01/09/2024 19.5  13.0 - 44.0 % Final    Monocytes % 01/09/2024 10.4  2.0 - 10.0 % Final    Eosinophils % 01/09/2024 8.1  0.0 - 6.0 % Final    Basophils % 01/09/2024 1.5  0.0 - 2.0 % Final    Neutrophils Absolute 01/09/2024 2.86  1.20 - 7.70 x10*3/uL Final    Immature Granulocytes Absolute, Au* 01/09/2024 0.00  0.00 - 0.70 x10*3/uL Final    Lymphocytes Absolute 01/09/2024 0.92 (L)  1.20 - 4.80 x10*3/uL Final    Monocytes Absolute 01/09/2024 0.49  0.10 - 1.00 x10*3/uL Final    Eosinophils Absolute 01/09/2024 0.38  0.00 - 0.70 x10*3/uL Final    Basophils Absolute 01/09/2024 0.07  0.00 - 0.10 x10*3/uL Final    Color, Urine 01/09/2024 Bernadette (N)  Straw, Yellow Final    Appearance, Urine 01/09/2024 Hazy (N)  Clear Final    Specific  Gravity, Urine 01/09/2024 1.025  1.005 - 1.035 Final    pH, Urine 01/09/2024 5.0  5.0, 5.5, 6.0, 6.5, 7.0, 7.5, 8.0 Final    Protein, Urine 01/09/2024 30 (1+) (N)  NEGATIVE mg/dL Final    Glucose, Urine 01/09/2024 NEGATIVE  NEGATIVE mg/dL Final    Blood, Urine 01/09/2024 LARGE (3+) (A)  NEGATIVE Final    Ketones, Urine 01/09/2024 5 (TRACE) (A)  NEGATIVE mg/dL Final    Bilirubin, Urine 01/09/2024 NEGATIVE  NEGATIVE Final    Urobilinogen, Urine 01/09/2024 2.0 (N)  <2.0 mg/dL Final    Nitrite, Urine 01/09/2024 NEGATIVE  NEGATIVE Final    Leukocyte Esterase, Urine 01/09/2024 NEGATIVE  NEGATIVE Final    Thyroxine, Free 01/09/2024 1.17  0.78 - 1.48 ng/dL Final    WBC, Urine 01/09/2024 6-10 (A)  1-5, NONE /HPF Final    RBC, Urine 01/09/2024 >20 (A)  NONE, 1-2, 3-5 /HPF Final    Mucus, Urine 01/09/2024 4+  Reference range not established. /LPF Final    Hyaline Casts, Urine 01/09/2024 1+ (A)  NONE /LPF Final   Hospital Outpatient Visit on 01/08/2024   Component Date Value Ref Range Status    Treatment Site 01/08/2024 Hip_R   Final    Course Number 01/08/2024 1   Final    Prescribed Fractional Dose 01/08/2024 200  cGray Final    Prescribed Total Dose 01/08/2024 5,000  cGray Final    Actual Fractions Delivered 01/08/2024 14   Final    Prescription Pattern Comment 01/08/2024 Extend CBCT ensure all CTV in field   Final    Actual Session Delivered Dose 01/08/2024 200  cGray Final    Actual Total Dose 01/08/2024 2,800  cGray Final    Prescribed Technique 01/08/2024 VMAT   Final    Elapsed Days 01/08/2024 21   Final    Start Date 01/08/2024 12/18/2023   Final    Last Date 01/08/2024 1/8/2024   Final    Prescribed Number of Fractions 01/08/2024 25   Final   There may be more visits with results that are not included.         Radiology Result:  I have reviewed the latest Imaging in PACS and the findings are noted in this note. I discussed the results of the latest imaging with the patient. All previous imaging were reviewed at the  time it was completed. Full records are available in the EMR for review as well.     CT CHEST WO IV CONTRAST;  11/17/2023 11:32 am  IMPRESSION:  1.  No evidence of intrathoracic metastatic disease  ==========================================================  MRI of the pelvis with and without IV contrast;  9/29/2023 10:23 am   IMPRESSION:  Complex, heterogeneously enhancing, partially necrotic intramuscular  right gluteus una mass. Imaging characteristics are aggressive  with underlying malignant lesion high in the differential. Recommend  orthopedic oncology consultation and image guided tissue sampling for  further evaluation.     Pathology Results:  I have reviewed the full pathology report recorded in the EMR. The pertinent portions indicating diagnosis are listed here in the note. for details please refer to the full report recorded in the EMR.    Collected 11/13/2023 09:57    Status: Final result    Right gluteal mass, biopsy:  -- Extraskeletal myxoid chondrosarcoma; see note and synoptic report.  -- Immunohistochemical stains performed on formalin-fixed, paraffin embedded sections demonstrate neoplastic cells to be positive for S100 and INI1 (retained nuclear expression), and negative for EMA, AE1/AE3 and synaptophysin.     Note: Sarcoma fusion NGS panel performed at [facility] shows the presence of a [id number] gene fusion, supporting the above diagnosis.     Assessment and Plan:   Assessment/Plan   Mr. Jason Hollins is a 63 y.o. male with a diagnosis of localized sarcoma. Please see the evolution of the case listed above in the cancer history.     Mr. Hollins started RT on 12/18/23. He increased Pazopanib to 800mg PO daily on 12/20/23. He also had elevation in the blood pressure to a maximum of 150/91 mmHg. He was started on Nifedipine ER 30mg PO daily on 12/19/23. Blood pressures have been 120-130/80's.      Plan:  - Continue Pazopanib at 800mg po daily. No blood on UA. Liver enzymes continue  improving. We added Prednisone 5mg PO daily. He will continue this until radiation finishes next week.   - Blood test on 01/22/2024   - Phone visit 01/23/2024.  - Continue Nifedipine ER 30mg PO daily.   - Continue to check BP at home and keep a log of the readings.  - Last day of radiation 01/23/2024.   - Scans 01/31/2024.  - Follow up with Dr. Hawkins 02/07/2024.     DISCLAIMER:   In preparing for this visit and writing this note, I reviewed all the previous electronic medical records (labs, imaging and medical charts) of the patient available in the physician portal. Significant findings which helped in decision making are recorded  in this chart.     The plan was discussed with the patient. We gave him ample opportunities to ask questions. All questions were answered to his satisfaction and he verbalized understanding.      INSTRUCTIONS FOR PATIENT  Mr. Jason Hollins ,  It was a pleasure talking to you today.    As discussed, please continue Pazopanib 800mg by mouth daily. Please also continue Nifedipine at 30mg by mouth daily and check your blood pressure daily. Please have labs 01/22/2024. I will call you 01/23/2024 with results.     Instructions to take Pazopanib:  - Take 4 pills together (1 pill at a time). Do not crush the pill. Swallow as a whole tablet.   - Take 1 hour prior to meal or 2 hours after meal.   - Please do not drink or eat Grapefruit, [place] oranges, Pomelo and Star fruit (and their juice), while you are on this medication.  - If you are taking Pantoprazole, Omeprazole, Lansoprazole or Esmoprazole, then please stop these medicines while you are on Pazopanib.   - Monitor your blood pressure at home. If systolic BP goes above 150mmHg or diastolic BP goes above 94mmHg, then give us a call.   - Every physician/nurse, especially surgeons should know that you are on Pazopanib. Surgeries are CONTRAINDICATED when you are taking pazopanib. Please let your surgeon talk to us before they plan surgery for  you.    Please make sure to schedule your appointments as we discussed. Your appointments should also appear in your MyChart.   In case of an emergency please dial 911 or report to your nearest Emergency Room.  For all other questions, please do not hesitate to reach out to us at the number listed below.    Thank you for choosing [facility] at [facility].   We appreciate your visit.     MD Siomara Hernandez APRN Taylor Costello, RN ([facility] location)  Maribel Jaffe RN ([facility])    Sarcoma and Cutaneous Oncology team    Phone: [phone]  Fax: [phone].

## 2024-01-17 ENCOUNTER — HOSPITAL ENCOUNTER (OUTPATIENT)
Dept: RADIATION ONCOLOGY | Facility: CLINIC | Age: 64
Setting detail: RADIATION/ONCOLOGY SERIES
Discharge: HOME | End: 2024-01-17
Payer: COMMERCIAL

## 2024-01-17 ENCOUNTER — RADIATION ONCOLOGY OTV (OUTPATIENT)
Dept: RADIATION ONCOLOGY | Facility: CLINIC | Age: 64
End: 2024-01-17
Payer: COMMERCIAL

## 2024-01-17 VITALS
SYSTOLIC BLOOD PRESSURE: 138 MMHG | WEIGHT: 260.58 LBS | RESPIRATION RATE: 18 BRPM | TEMPERATURE: 97.5 F | DIASTOLIC BLOOD PRESSURE: 81 MMHG | HEART RATE: 74 BPM | BODY MASS INDEX: 37.39 KG/M2 | OXYGEN SATURATION: 97 %

## 2024-01-17 DIAGNOSIS — Z51.0 ENCOUNTER FOR ANTINEOPLASTIC RADIATION THERAPY: ICD-10-CM

## 2024-01-17 DIAGNOSIS — C49.21 MALIGNANT NEOPLASM OF CONNECTIVE AND SOFT TISSUE OF RIGHT LOWER LIMB, INCLUDING HIP (MULTI): ICD-10-CM

## 2024-01-17 LAB
RAD ONC MSQ ACTUAL FRACTIONS DELIVERED: 21
RAD ONC MSQ ACTUAL SESSION DELIVERED DOSE: 200 CGRAY
RAD ONC MSQ ACTUAL TOTAL DOSE: 4200 CGRAY
RAD ONC MSQ ELAPSED DAYS: 30
RAD ONC MSQ LAST DATE: NORMAL
RAD ONC MSQ PRESCRIBED FRACTIONAL DOSE: 200 CGRAY
RAD ONC MSQ PRESCRIBED NUMBER OF FRACTIONS: 25
RAD ONC MSQ PRESCRIBED TECHNIQUE: NORMAL
RAD ONC MSQ PRESCRIBED TOTAL DOSE: 5000 CGRAY
RAD ONC MSQ PRESCRIPTION PATTERN COMMENT: NORMAL
RAD ONC MSQ START DATE: NORMAL
RAD ONC MSQ TREATMENT COURSE NUMBER: 1
RAD ONC MSQ TREATMENT SITE: NORMAL

## 2024-01-17 PROCEDURE — 77014 CHG CT GUIDANCE RADIATION THERAPY FLDS PLACEMENT: CPT | Performed by: STUDENT IN AN ORGANIZED HEALTH CARE EDUCATION/TRAINING PROGRAM

## 2024-01-17 PROCEDURE — 77386 HC INTENSITY-MODULATED RADIATION THERAPY (IMRT), COMPLEX: CPT | Performed by: STUDENT IN AN ORGANIZED HEALTH CARE EDUCATION/TRAINING PROGRAM

## 2024-01-17 PROCEDURE — 77427 RADIATION TX MANAGEMENT X5: CPT | Performed by: STUDENT IN AN ORGANIZED HEALTH CARE EDUCATION/TRAINING PROGRAM

## 2024-01-17 ASSESSMENT — PAIN SCALES - GENERAL: PAINLEVEL: 0-NO PAIN

## 2024-01-17 NOTE — PROGRESS NOTES
Radiation Oncology On Treatment Visit    Patient Name:  Jason Hollins  MRN:  [id number]  :  1960    Referring Provider: Mikal Hawkins MD  Primary Care Provider: RAQUEL Gregory  Care Team: Patient Care Team:  RAQUEL Gregory as PCP - General  Chiki Carbajal MD as Consulting Physician (Hematology and Oncology)  Mikal Hawkins MD as Consulting Physician (Orthopaedic Surgery)  Desmond Humphries MD as Radiation Oncologist (Radiation Oncology)    Date of Service: 2024     Diagnosis:   Specialty Problems          Radiation Oncology Problems    Soft tissue sarcoma (CMS/HCC)         Treatment Summary:  Radiation Treatments       Active   Hip_R (Started on 2023)   Most recent fraction: 200 cGy given on 2024   Total given: 4,200 cGy / 5,000 cGy  (21 of 25 fractions)   Elapsed Days: 30   Technique: VMAT           Completed   No historical radiation treatments to show.             SUBJECTIVE: Energy level stable with grade 1 fatigue relieved with rest.  The patient has stable loose bowel movements with occasional diarrhea a few times per day.  The patient notes some dry skin and is using skin moisturizers.      OBJECTIVE:   Vital Signs:  /81 (BP Location: Left arm, Patient Position: Sitting)   Pulse 74   Temp 36.4 °C (97.5 °F)   Resp 18   Wt 118 kg (260 lb 9.3 oz)   SpO2 97%   BMI 37.39 kg/m²    Pain Scale: The patient's current pain level was assessed.  They report currently having a pain of 0 out of 10.    Other Pertinent Findings:     Toxicity Assessment          2023    07:55 2023    07:34 1/3/2024    07:28 1/10/2024    07:37 2024    07:26   Toxicity Assessment   Treatment Site General General General General General   Anorexia Grade 0 Grade 0 Grade 0 Grade 1 Grade 0   Anxiety Grade 0 Grade 0 Grade 0 Grade 0 Grade 0   Dehydration Grade 0 Grade 0 Grade 0 Grade 0 Grade 0   Depression Grade 0 Grade 0 Grade 0 Grade 0 Grade 0   Dermatitis Radiation Grade 0  Grade 0 Grade 0 Grade 0 Grade 0   Diarrhea Grade 1 Grade 1       last couple days 1-2 times Grade 1 Grade 1       2 times a day Grade 1       2 times per day   Fatigue Grade 0 Grade 0 Grade 1 Grade 1 Grade 1   Fibrosis Deep Connective Tissue Grade 0 Grade 0 Grade 0 Grade 0 Grade 0   Fracture Grade 0 Grade 0 Grade 0 Grade 0 Grade 0   Nausea Grade 0 Grade 0 Grade 0 Grade 0 Grade 0   Pain Grade 1 Grade 1 Grade 0 Grade 1 Grade 0   Treatment Related Secondary Malignancy Grade 0 Grade 0 Grade 0 Grade 0 Grade 0   Tumor Pain Grade 0 Grade 0 Grade 0 Grade 0 Grade 0   Vomiting Grade 0 Grade 0 Grade 0 Grade 0 Grade 0   Alopecia Grade 0 Grade 0 Grade 0 Grade 0 Grade 0   Erythroderma Grade 0 Grade 0 Grade 0 Grade 0 Grade 0   Pain of Skin Grade 0 Grade 0 Grade 0 Grade 0 Grade 0   Pruritus Grade 0 Grade 0 Grade 0 Grade 0 Grade 0   Rash Acneiform Grade 0 Grade 0 Grade 0 Grade 0 Grade 0   Skin Hyperpigmentation Grade 0 Grade 0 Grade 0 Grade 0 Grade 0   Skin Hypopigmentation Grade 0 Grade 0 Grade 0 Grade 0 Grade 0   Skin Induration Grade 0 Grade 0 Grade 0 Grade 0 Grade 0   Skin Ulceration Grade 0 Grade 0 Grade 0 Grade 0 Grade 0   Telangiectasia Grade 0 Grade 0 Grade 0 Grade 0 Grade 0        Concurrent systemic therapy: Pazopanib    Labs:   WBC   Date Value Ref Range Status   01/15/2024 4.5 4.4 - 11.3 x10*3/uL Final   01/12/2024 3.8 (L) 4.4 - 11.3 x10*3/uL Final     Hemoglobin   Date Value Ref Range Status   01/15/2024 14.7 13.5 - 17.5 g/dL Final   01/12/2024 14.8 13.5 - 17.5 g/dL Final     Hematocrit   Date Value Ref Range Status   01/15/2024 43.7 41.0 - 52.0 % Final   01/12/2024 43.5 41.0 - 52.0 % Final     Neutrophils Absolute   Date Value Ref Range Status   01/15/2024 2.89 1.20 - 7.70 x10*3/uL Final     Comment:     Percent differential counts (%) should be interpreted in the context of the absolute cell counts (cells/uL).   01/12/2024 2.26 1.20 - 7.70 x10*3/uL Final     Comment:     Percent differential counts (%) should be  interpreted in the context of the absolute cell counts (cells/uL).     Platelets   Date Value Ref Range Status   01/15/2024 176 150 - 450 x10*3/uL Final   01/12/2024 168 150 - 450 x10*3/uL Final     Alkaline Phosphatase   Date Value Ref Range Status   01/15/2024 85 33 - 136 U/L Final   01/12/2024 81 33 - 136 U/L Final     AST   Date Value Ref Range Status   01/15/2024 82 (H) 9 - 39 U/L Final   01/12/2024 108 (H) 9 - 39 U/L Final     ALT   Date Value Ref Range Status   01/15/2024 175 (H) 10 - 52 U/L Final     Comment:     Patients treated with Sulfasalazine may generate falsely decreased results for ALT.   01/12/2024 213 (H) 10 - 52 U/L Final     Comment:     Patients treated with Sulfasalazine may generate falsely decreased results for ALT.     Bilirubin, Total   Date Value Ref Range Status   01/15/2024 1.0 0.0 - 1.2 mg/dL Final   01/12/2024 1.1 0.0 - 1.2 mg/dL Final     Glucose   Date Value Ref Range Status   01/15/2024 176 (H) 74 - 99 mg/dL Final   01/12/2024 158 (H) 74 - 99 mg/dL Final     Calcium   Date Value Ref Range Status   01/15/2024 8.9 8.6 - 10.3 mg/dL Final   01/12/2024 9.3 8.6 - 10.3 mg/dL Final     Sodium   Date Value Ref Range Status   01/15/2024 145 136 - 145 mmol/L Final   01/12/2024 143 136 - 145 mmol/L Final     Potassium   Date Value Ref Range Status   01/15/2024 3.7 3.5 - 5.3 mmol/L Final   01/12/2024 3.7 3.5 - 5.3 mmol/L Final     Bicarbonate   Date Value Ref Range Status   01/15/2024 25 21 - 32 mmol/L Final   01/12/2024 27 21 - 32 mmol/L Final     Chloride   Date Value Ref Range Status   01/15/2024 110 (H) 98 - 107 mmol/L Final   01/12/2024 107 98 - 107 mmol/L Final     Urea Nitrogen   Date Value Ref Range Status   01/15/2024 17 6 - 23 mg/dL Final   01/12/2024 18 6 - 23 mg/dL Final     Creatinine   Date Value Ref Range Status   01/15/2024 0.86 0.50 - 1.30 mg/dL Final   01/12/2024 1.05 0.50 - 1.30 mg/dL Final         Exam: Comfortably in chair.     Assessment / Plan:  The patient is tolerating  radiation therapy as anticipated.  Continue per current treatment plan.       Therapies: Rest as needed for fatigue.  Udderly smooth given for skin care.  Recommended Imodium if diarrhea occurs more than 4 times per day.  Recommended patient stick with low fiber diet and keep hydrated.    Side effects reviewed with patient. Images, chart and labs reviewed.    The patient sees his surgeon in early February 2024 for follow-up to discuss surgical plan.  Based on results of final pathology after surgery the patient may be referred back for additional radiation therapy if indicated.  Otherwise the patient may follow-up on an as-needed basis.    Covering for Dr. Humphries.    Joby Birch MD

## 2024-01-18 ENCOUNTER — HOSPITAL ENCOUNTER (OUTPATIENT)
Dept: RADIATION ONCOLOGY | Facility: CLINIC | Age: 64
Setting detail: RADIATION/ONCOLOGY SERIES
Discharge: HOME | End: 2024-01-18
Payer: COMMERCIAL

## 2024-01-18 DIAGNOSIS — Z51.0 ENCOUNTER FOR ANTINEOPLASTIC RADIATION THERAPY: ICD-10-CM

## 2024-01-18 DIAGNOSIS — C49.21 MALIGNANT NEOPLASM OF CONNECTIVE AND SOFT TISSUE OF RIGHT LOWER LIMB, INCLUDING HIP (MULTI): ICD-10-CM

## 2024-01-18 LAB
RAD ONC MSQ ACTUAL FRACTIONS DELIVERED: 22
RAD ONC MSQ ACTUAL SESSION DELIVERED DOSE: 200 CGRAY
RAD ONC MSQ ACTUAL TOTAL DOSE: 4400 CGRAY
RAD ONC MSQ ELAPSED DAYS: 31
RAD ONC MSQ LAST DATE: NORMAL
RAD ONC MSQ PRESCRIBED FRACTIONAL DOSE: 200 CGRAY
RAD ONC MSQ PRESCRIBED NUMBER OF FRACTIONS: 25
RAD ONC MSQ PRESCRIBED TECHNIQUE: NORMAL
RAD ONC MSQ PRESCRIBED TOTAL DOSE: 5000 CGRAY
RAD ONC MSQ PRESCRIPTION PATTERN COMMENT: NORMAL
RAD ONC MSQ START DATE: NORMAL
RAD ONC MSQ TREATMENT COURSE NUMBER: 1
RAD ONC MSQ TREATMENT SITE: NORMAL

## 2024-01-18 PROCEDURE — 77386 HC INTENSITY-MODULATED RADIATION THERAPY (IMRT), COMPLEX: CPT | Performed by: STUDENT IN AN ORGANIZED HEALTH CARE EDUCATION/TRAINING PROGRAM

## 2024-01-18 PROCEDURE — 77014 CHG CT GUIDANCE RADIATION THERAPY FLDS PLACEMENT: CPT | Performed by: STUDENT IN AN ORGANIZED HEALTH CARE EDUCATION/TRAINING PROGRAM

## 2024-01-19 ENCOUNTER — HOSPITAL ENCOUNTER (OUTPATIENT)
Dept: RADIATION ONCOLOGY | Facility: CLINIC | Age: 64
Setting detail: RADIATION/ONCOLOGY SERIES
Discharge: HOME | End: 2024-01-19
Payer: COMMERCIAL

## 2024-01-19 DIAGNOSIS — Z51.0 ENCOUNTER FOR ANTINEOPLASTIC RADIATION THERAPY: ICD-10-CM

## 2024-01-19 DIAGNOSIS — C49.21 MALIGNANT NEOPLASM OF CONNECTIVE AND SOFT TISSUE OF RIGHT LOWER LIMB, INCLUDING HIP (MULTI): ICD-10-CM

## 2024-01-19 LAB
RAD ONC MSQ ACTUAL FRACTIONS DELIVERED: 23
RAD ONC MSQ ACTUAL SESSION DELIVERED DOSE: 200 CGRAY
RAD ONC MSQ ACTUAL TOTAL DOSE: 4600 CGRAY
RAD ONC MSQ ELAPSED DAYS: 32
RAD ONC MSQ LAST DATE: NORMAL
RAD ONC MSQ PRESCRIBED FRACTIONAL DOSE: 200 CGRAY
RAD ONC MSQ PRESCRIBED NUMBER OF FRACTIONS: 25
RAD ONC MSQ PRESCRIBED TECHNIQUE: NORMAL
RAD ONC MSQ PRESCRIBED TOTAL DOSE: 5000 CGRAY
RAD ONC MSQ PRESCRIPTION PATTERN COMMENT: NORMAL
RAD ONC MSQ START DATE: NORMAL
RAD ONC MSQ TREATMENT COURSE NUMBER: 1
RAD ONC MSQ TREATMENT SITE: NORMAL

## 2024-01-19 PROCEDURE — 77014 CHG CT GUIDANCE RADIATION THERAPY FLDS PLACEMENT: CPT | Performed by: STUDENT IN AN ORGANIZED HEALTH CARE EDUCATION/TRAINING PROGRAM

## 2024-01-19 PROCEDURE — 77386 HC INTENSITY-MODULATED RADIATION THERAPY (IMRT), COMPLEX: CPT | Performed by: STUDENT IN AN ORGANIZED HEALTH CARE EDUCATION/TRAINING PROGRAM

## 2024-01-22 ENCOUNTER — HOSPITAL ENCOUNTER (OUTPATIENT)
Dept: RADIATION ONCOLOGY | Facility: CLINIC | Age: 64
Setting detail: RADIATION/ONCOLOGY SERIES
Discharge: HOME | End: 2024-01-22
Payer: COMMERCIAL

## 2024-01-22 ENCOUNTER — LAB (OUTPATIENT)
Dept: LAB | Facility: CLINIC | Age: 64
End: 2024-01-22
Payer: COMMERCIAL

## 2024-01-22 DIAGNOSIS — E55.9 VITAMIN D DEFICIENCY: ICD-10-CM

## 2024-01-22 DIAGNOSIS — R53.0 NEOPLASTIC MALIGNANT RELATED FATIGUE: ICD-10-CM

## 2024-01-22 DIAGNOSIS — E66.9 DIABETES MELLITUS TYPE 2 IN OBESE: ICD-10-CM

## 2024-01-22 DIAGNOSIS — Z51.0 ENCOUNTER FOR ANTINEOPLASTIC RADIATION THERAPY: ICD-10-CM

## 2024-01-22 DIAGNOSIS — C49.9 SOFT TISSUE SARCOMA (MULTI): ICD-10-CM

## 2024-01-22 DIAGNOSIS — C49.21 MALIGNANT NEOPLASM OF CONNECTIVE AND SOFT TISSUE OF RIGHT LOWER LIMB, INCLUDING HIP (MULTI): ICD-10-CM

## 2024-01-22 DIAGNOSIS — Z79.899 ENCOUNTER FOR MONITORING CARDIOTOXIC DRUG THERAPY: ICD-10-CM

## 2024-01-22 DIAGNOSIS — Z51.81 ENCOUNTER FOR MONITORING CARDIOTOXIC DRUG THERAPY: ICD-10-CM

## 2024-01-22 DIAGNOSIS — E11.69 DIABETES MELLITUS TYPE 2 IN OBESE: ICD-10-CM

## 2024-01-22 LAB
ALBUMIN SERPL BCP-MCNC: 4.2 G/DL (ref 3.4–5)
ALP SERPL-CCNC: 71 U/L (ref 33–136)
ALT SERPL W P-5'-P-CCNC: 90 U/L (ref 10–52)
ANION GAP SERPL CALC-SCNC: 14 MMOL/L (ref 10–20)
APPEARANCE UR: ABNORMAL
AST SERPL W P-5'-P-CCNC: 42 U/L (ref 9–39)
BASOPHILS # BLD AUTO: 0.03 X10*3/UL (ref 0–0.1)
BASOPHILS NFR BLD AUTO: 0.6 %
BILIRUB SERPL-MCNC: 1.2 MG/DL (ref 0–1.2)
BILIRUB UR STRIP.AUTO-MCNC: NEGATIVE MG/DL
BUN SERPL-MCNC: 14 MG/DL (ref 6–23)
CALCIUM SERPL-MCNC: 8.9 MG/DL (ref 8.6–10.3)
CHLORIDE SERPL-SCNC: 107 MMOL/L (ref 98–107)
CO2 SERPL-SCNC: 24 MMOL/L (ref 21–32)
COLOR UR: ABNORMAL
CREAT SERPL-MCNC: 0.9 MG/DL (ref 0.5–1.3)
EGFRCR SERPLBLD CKD-EPI 2021: >90 ML/MIN/1.73M*2
EOSINOPHIL # BLD AUTO: 0.1 X10*3/UL (ref 0–0.7)
EOSINOPHIL NFR BLD AUTO: 2.1 %
ERYTHROCYTE [DISTWIDTH] IN BLOOD BY AUTOMATED COUNT: 14.1 % (ref 11.5–14.5)
GLUCOSE SERPL-MCNC: 179 MG/DL (ref 74–99)
GLUCOSE UR STRIP.AUTO-MCNC: ABNORMAL MG/DL
HCT VFR BLD AUTO: 43.4 % (ref 41–52)
HGB BLD-MCNC: 14.7 G/DL (ref 13.5–17.5)
IMM GRANULOCYTES # BLD AUTO: 0.02 X10*3/UL (ref 0–0.7)
IMM GRANULOCYTES NFR BLD AUTO: 0.4 % (ref 0–0.9)
KETONES UR STRIP.AUTO-MCNC: ABNORMAL MG/DL
LDH SERPL L TO P-CCNC: 170 U/L (ref 84–246)
LEUKOCYTE ESTERASE UR QL STRIP.AUTO: NEGATIVE
LYMPHOCYTES # BLD AUTO: 0.73 X10*3/UL (ref 1.2–4.8)
LYMPHOCYTES NFR BLD AUTO: 15.1 %
MCH RBC QN AUTO: 26.9 PG (ref 26–34)
MCHC RBC AUTO-ENTMCNC: 33.9 G/DL (ref 32–36)
MCV RBC AUTO: 79 FL (ref 80–100)
MONOCYTES # BLD AUTO: 0.42 X10*3/UL (ref 0.1–1)
MONOCYTES NFR BLD AUTO: 8.7 %
MUCOUS THREADS #/AREA URNS AUTO: NORMAL /LPF
NEUTROPHILS # BLD AUTO: 3.53 X10*3/UL (ref 1.2–7.7)
NEUTROPHILS NFR BLD AUTO: 73.1 %
NITRITE UR QL STRIP.AUTO: NEGATIVE
NRBC BLD-RTO: 0 /100 WBCS (ref 0–0)
PH UR STRIP.AUTO: 5 [PH]
PLATELET # BLD AUTO: 208 X10*3/UL (ref 150–450)
POTASSIUM SERPL-SCNC: 3.5 MMOL/L (ref 3.5–5.3)
PROT SERPL-MCNC: 6.7 G/DL (ref 6.4–8.2)
PROT UR STRIP.AUTO-MCNC: ABNORMAL MG/DL
RAD ONC MSQ ACTUAL FRACTIONS DELIVERED: 24
RAD ONC MSQ ACTUAL SESSION DELIVERED DOSE: 200 CGRAY
RAD ONC MSQ ACTUAL TOTAL DOSE: 4800 CGRAY
RAD ONC MSQ ELAPSED DAYS: 35
RAD ONC MSQ LAST DATE: NORMAL
RAD ONC MSQ PRESCRIBED FRACTIONAL DOSE: 200 CGRAY
RAD ONC MSQ PRESCRIBED NUMBER OF FRACTIONS: 25
RAD ONC MSQ PRESCRIBED TECHNIQUE: NORMAL
RAD ONC MSQ PRESCRIBED TOTAL DOSE: 5000 CGRAY
RAD ONC MSQ PRESCRIPTION PATTERN COMMENT: NORMAL
RAD ONC MSQ START DATE: NORMAL
RAD ONC MSQ TREATMENT COURSE NUMBER: 1
RAD ONC MSQ TREATMENT SITE: NORMAL
RBC # BLD AUTO: 5.47 X10*6/UL (ref 4.5–5.9)
RBC # UR STRIP.AUTO: NEGATIVE /UL
RBC #/AREA URNS AUTO: NORMAL /HPF
SODIUM SERPL-SCNC: 141 MMOL/L (ref 136–145)
SP GR UR STRIP.AUTO: 1.03
TSH SERPL-ACNC: 3.47 MIU/L (ref 0.44–3.98)
UROBILINOGEN UR STRIP.AUTO-MCNC: 2 MG/DL
WBC # BLD AUTO: 4.8 X10*3/UL (ref 4.4–11.3)
WBC #/AREA URNS AUTO: NORMAL /HPF

## 2024-01-22 PROCEDURE — 36415 COLL VENOUS BLD VENIPUNCTURE: CPT

## 2024-01-22 PROCEDURE — 77386 HC INTENSITY-MODULATED RADIATION THERAPY (IMRT), COMPLEX: CPT | Performed by: STUDENT IN AN ORGANIZED HEALTH CARE EDUCATION/TRAINING PROGRAM

## 2024-01-22 PROCEDURE — 80053 COMPREHEN METABOLIC PANEL: CPT

## 2024-01-22 PROCEDURE — 77014 CHG CT GUIDANCE RADIATION THERAPY FLDS PLACEMENT: CPT | Performed by: STUDENT IN AN ORGANIZED HEALTH CARE EDUCATION/TRAINING PROGRAM

## 2024-01-22 PROCEDURE — 85025 COMPLETE CBC W/AUTO DIFF WBC: CPT

## 2024-01-22 PROCEDURE — 84443 ASSAY THYROID STIM HORMONE: CPT

## 2024-01-22 PROCEDURE — 83615 LACTATE (LD) (LDH) ENZYME: CPT

## 2024-01-22 PROCEDURE — 81001 URINALYSIS AUTO W/SCOPE: CPT

## 2024-01-22 NOTE — TELEPHONE ENCOUNTER
Patient called in to see why his medication was denied by provider Rx refill on dulaglutide (Trulicity) 4.5 mg/0.5 mL pen injector

## 2024-01-23 ENCOUNTER — DOCUMENTATION (OUTPATIENT)
Dept: RADIATION ONCOLOGY | Facility: CLINIC | Age: 64
End: 2024-01-23

## 2024-01-23 ENCOUNTER — OFFICE VISIT (OUTPATIENT)
Dept: HEMATOLOGY/ONCOLOGY | Facility: HOSPITAL | Age: 64
End: 2024-01-23
Payer: COMMERCIAL

## 2024-01-23 ENCOUNTER — HOSPITAL ENCOUNTER (OUTPATIENT)
Dept: RADIATION ONCOLOGY | Facility: CLINIC | Age: 64
Setting detail: RADIATION/ONCOLOGY SERIES
Discharge: HOME | End: 2024-01-23
Payer: COMMERCIAL

## 2024-01-23 DIAGNOSIS — Z51.0 ENCOUNTER FOR ANTINEOPLASTIC RADIATION THERAPY: ICD-10-CM

## 2024-01-23 DIAGNOSIS — C49.21 MALIGNANT NEOPLASM OF CONNECTIVE AND SOFT TISSUE OF RIGHT LOWER LIMB, INCLUDING HIP (MULTI): ICD-10-CM

## 2024-01-23 DIAGNOSIS — C49.9 SOFT TISSUE SARCOMA (MULTI): Primary | ICD-10-CM

## 2024-01-23 LAB
RAD ONC MSQ ACTUAL FRACTIONS DELIVERED: 25
RAD ONC MSQ ACTUAL SESSION DELIVERED DOSE: 200 CGRAY
RAD ONC MSQ ACTUAL TOTAL DOSE: 5000 CGRAY
RAD ONC MSQ ELAPSED DAYS: 36
RAD ONC MSQ LAST DATE: NORMAL
RAD ONC MSQ PRESCRIBED FRACTIONAL DOSE: 200 CGRAY
RAD ONC MSQ PRESCRIBED NUMBER OF FRACTIONS: 25
RAD ONC MSQ PRESCRIBED TECHNIQUE: NORMAL
RAD ONC MSQ PRESCRIBED TOTAL DOSE: 5000 CGRAY
RAD ONC MSQ PRESCRIPTION PATTERN COMMENT: NORMAL
RAD ONC MSQ START DATE: NORMAL
RAD ONC MSQ TREATMENT COURSE NUMBER: 1
RAD ONC MSQ TREATMENT SITE: NORMAL

## 2024-01-23 PROCEDURE — 77014 CHG CT GUIDANCE RADIATION THERAPY FLDS PLACEMENT: CPT | Performed by: STUDENT IN AN ORGANIZED HEALTH CARE EDUCATION/TRAINING PROGRAM

## 2024-01-23 PROCEDURE — 77386 HC INTENSITY-MODULATED RADIATION THERAPY (IMRT), COMPLEX: CPT | Performed by: STUDENT IN AN ORGANIZED HEALTH CARE EDUCATION/TRAINING PROGRAM

## 2024-01-23 PROCEDURE — 3008F BODY MASS INDEX DOCD: CPT | Performed by: NURSE PRACTITIONER

## 2024-01-23 PROCEDURE — 1036F TOBACCO NON-USER: CPT | Performed by: NURSE PRACTITIONER

## 2024-01-23 PROCEDURE — 99443 PR PHYS/QHP TELEPHONE EVALUATION 21-30 MIN: CPT | Performed by: NURSE PRACTITIONER

## 2024-01-23 PROCEDURE — 77336 RADIATION PHYSICS CONSULT: CPT | Performed by: STUDENT IN AN ORGANIZED HEALTH CARE EDUCATION/TRAINING PROGRAM

## 2024-01-23 ASSESSMENT — ENCOUNTER SYMPTOMS
ENDOCRINE NEGATIVE: 1
RESPIRATORY NEGATIVE: 1
CONSTITUTIONAL NEGATIVE: 1
MUSCULOSKELETAL NEGATIVE: 1
HEMATOLOGIC/LYMPHATIC NEGATIVE: 1
DIARRHEA: 1
NEUROLOGICAL NEGATIVE: 1
PSYCHIATRIC NEGATIVE: 1
EYES NEGATIVE: 1
CARDIOVASCULAR NEGATIVE: 1

## 2024-01-23 NOTE — PROGRESS NOTES
".Patient ID: Jason Hollins is a 63 y.o. male.  Referring Physician: No referring provider defined for this encounter.  Primary Care Provider: PHILLIP Gregory-CNP     Oncology follow up    HPI  Mr. Jason Hollins is referred by Dr. 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 He met with our team on 11/29/23.      Mr. Hollins first noticed a mass in the right buttock in June 2023. This was eventually investigated by his PCP and he was found to have a mass. MRI on 09/20/23 reported a 14.9 x 12.7 x 8.3 cm predominantly T2 hyperintense, T1 hypointense, heterogeneously enhancing, encapsulated intramuscular solid lesion in the right gluteus una muscle. He was referred to Dr. Mikal Hawkins, whom he met on 10/25/23. CT guided biopsy of the mass was pursued on 11/13/2023. The pathology slides were discussed in the tumor board and a diagnosis of sarcoma was rendered. He was referred to Radiation Oncology and Medical Oncology for planning pre-operative radiation and neoadjuvant pazopanib.     He met us on 11/29/23 and we prescribed the pazopanib. He received this medicine on 12/11/23 and he started pazopanib at 400mg on 12/14/23. His labs were within normal limits on 12/18/23 and we increased the dose to 800mg on 12/20/23. We also added nifedipine ER 30mg on 12/19/23 for Grade 1 HTN (BP highest read- 150/91 mmHg)     Systemic Treatment:  A Pazopanib concurrent with Radiation.   Started on 12/14/23 at 400mg PO daily. Increased to 800mg PO daily on 12/20/23. Last dose 01/23/2024.  Radiation started on 12/18/23-01/23/2024.    Subjective   Please refer to \"Notes/Cancer History\" above for complete History of present illness.     Mr Jason Hollins     - Overall feels well. No changes from last week,   - Checking BP at home. 120-130/80-90's.   - Diarrhea a couple times daily. He has been watching what he is eating.   - No bleeding in urine or stool.      Review of Systems:   Review of Systems "   Constitutional: Negative.    HENT:  Negative.     Eyes: Negative.    Respiratory: Negative.     Cardiovascular: Negative.    Gastrointestinal:  Positive for diarrhea.        Couple times a day if he eats a lot   Endocrine: Negative.    Genitourinary: Negative.     Musculoskeletal: Negative.    Skin: Negative.    Neurological: Negative.    Hematological: Negative.    Psychiatric/Behavioral: Negative.           MEDICAL HISTORY  Past Medical History:   Diagnosis Date    Morbid (severe) obesity due to excess calories (CMS/[facility])     Severe obesity (BMI 35.0-35.9 with comorbidity)    Morbid (severe) obesity due to excess calories (CMS/[facility]) 05/31/2016    Severe obesity (BMI 35.0-39.9) with comorbidity    Personal history of other endocrine, nutritional and metabolic disease     History of diabetes mellitus    Personal history of other endocrine, nutritional and metabolic disease     History of hyperlipidemia       FAMILY HISTORY  Family History   Problem Relation Name Age of Onset    Coronary artery disease Mother      Diabetes Mother      Hypertension Mother      Cancer Father      Diabetes Other Grandmother        TOBACCO HISTORY  Tobacco Use: Low Risk  (1/5/2024)    Patient History     Smoking Tobacco Use: Never     Smokeless Tobacco Use: Never     Passive Exposure: Not on file       SOCIAL HISTORY  Social Connections: Not on file   . Lives with his wife. Has 2 children. Working full time.      Outpatient Medication Profile:  Current Outpatient Medications on File Prior to Visit   Medication Sig Dispense Refill    aspirin 81 mg EC tablet Take 1 tablet (81 mg) by mouth once daily. AS DIRECTED.      atorvastatin (Lipitor) 40 mg tablet Take 1 tablet (40 mg) by mouth once daily. 90 tablet 1    blood sugar diagnostic (Blood Glucose Test) strip once daily. One Drop Test In Vitro Strip; Test      dulaglutide (Trulicity) 4.5 mg/0.5 mL pen injector Inject 4.5 mg under the skin 1 (one) time per week. 2 mL 2    dulaglutide  (TRULICITY) 4.5 mg/0.5 mL pen injector INJECT ONE PEN (4.5 MG) UNDER THE SKIN ONCE WEEKLY 2 mL 2    ergocalciferol (Vitamin D-2) 1.25 MG (83731 UT) capsule Take 1 capsule (50,000 Units) by mouth every 14 (fourteen) days. twice monthly on the 15th and the 30 th for vitamin d deficiency 6 capsule 1    fish oil concentrate (Omega-3) 120-180 mg capsule Take 3 capsules (3,000 mg) by mouth.      gabapentin (Neurontin) 100 mg capsule Take one capsule and increase by 1 capsule every 4 days until taking 3 capsules 90 capsule 0    gabapentin (Neurontin) 300 mg capsule Use the 100 mg capsule to titrate to 300 mg , then use the 300 mg capsule and take 1 to 2 capsules hs for pain 90 capsule 2    ibuprofen 800 mg tablet Take 1 tablet (800 mg) by mouth 3 times a day as needed for mild pain (1 - 3) (pain). 180 tablet 3    metFORMIN (Glucophage) 1,000 mg tablet TAKE 1 TABLET BY MOUTH EVERY MORNING AND 1.5 TABLETS BY MOUTH EVERY EVENING , 225 tablet 0    multivitamin tablet Take 1 tablet by mouth once daily.      NIFEdipine ER (NIFEdipine XL) 30 mg 24 hr tablet Take 1 tablet (30 mg) by mouth once daily in the morning. Take before meals. Do not crush, chew, or split. 90 tablet 3    OneTouch Ultra Test strip Test blood sugar once daily 100 strip 3    PAZOPanib (Votrient) 200 mg tablet Take 4 tablets (800 mg total) by mouth once daily.  Take on an empty stomach, at least 1 hour before or 2 hours after a meal. 112 tablet 3    predniSONE (Deltasone) 5 mg tablet Take 1 tablet (5 mg) by mouth once daily.      sildenafil (Viagra) 100 mg tablet Take 1 tablet (100 mg) by mouth once daily as needed (1 hour before needed). 10 tablet 2    tiZANidine (Zanaflex) 4 mg capsule Take 1 capsule (4 mg) by mouth 3 times a day. 40 capsule 2     No current facility-administered medications on file prior to visit.         Performance Status:  Asymptomatic     Vitals and Measurements:   There were no vitals taken for this visit.   Telephone visit       Physical Exam:   Physical Exam   Telephone visit       Lab Results:  I have reviewed these laboratory results:     Hospital Outpatient Visit on 01/23/2024   Component Date Value Ref Range Status    Treatment Site 01/23/2024 Hip_R   Final    Course Number 01/23/2024 1   Final    Prescribed Fractional Dose 01/23/2024 200  cGray Final    Prescribed Total Dose 01/23/2024 5,000  cGray Final    Actual Fractions Delivered 01/23/2024 25   Final    Prescription Pattern Comment 01/23/2024 Extend CBCT ensure all CTV in field   Final    Actual Session Delivered Dose 01/23/2024 200  cGray Final    Actual Total Dose 01/23/2024 5,000  cGray Final    Prescribed Technique 01/23/2024 VMAT   Final    Elapsed Days 01/23/2024 36   Final    Start Date 01/23/2024 12/18/2023   Final    Last Date 01/23/2024 1/23/2024   Final    Prescribed Number of Fractions 01/23/2024 25   Final   Hospital Outpatient Visit on 01/22/2024   Component Date Value Ref Range Status    Treatment Site 01/22/2024 Hip_R   Final    Course Number 01/22/2024 1   Final    Prescribed Fractional Dose 01/22/2024 200  cGray Final    Prescribed Total Dose 01/22/2024 5,000  cGray Final    Actual Fractions Delivered 01/22/2024 24   Final    Prescription Pattern Comment 01/22/2024 Extend CBCT ensure all CTV in field   Final    Actual Session Delivered Dose 01/22/2024 200  cGray Final    Actual Total Dose 01/22/2024 4,800  cGray Final    Prescribed Technique 01/22/2024 VMAT   Final    Elapsed Days 01/22/2024 35   Final    Start Date 01/22/2024 12/18/2023   Final    Last Date 01/22/2024 1/22/2024   Final    Prescribed Number of Fractions 01/22/2024 25   Final   Lab on 01/22/2024   Component Date Value Ref Range Status    Thyroid Stimulating Hormone 01/22/2024 3.47  0.44 - 3.98 mIU/L Final    WBC 01/22/2024 4.8  4.4 - 11.3 x10*3/uL Final    nRBC 01/22/2024 0.0  0.0 - 0.0 /100 WBCs Final    RBC 01/22/2024 5.47  4.50 - 5.90 x10*6/uL Final    Hemoglobin 01/22/2024 14.7  13.5 - 17.5  g/dL Final    Hematocrit 01/22/2024 43.4  41.0 - 52.0 % Final    MCV 01/22/2024 79 (L)  80 - 100 fL Final    MCH 01/22/2024 26.9  26.0 - 34.0 pg Final    MCHC 01/22/2024 33.9  32.0 - 36.0 g/dL Final    RDW 01/22/2024 14.1  11.5 - 14.5 % Final    Platelets 01/22/2024 208  150 - 450 x10*3/uL Final    Neutrophils % 01/22/2024 73.1  40.0 - 80.0 % Final    Immature Granulocytes %, Automated 01/22/2024 0.4  0.0 - 0.9 % Final    Lymphocytes % 01/22/2024 15.1  13.0 - 44.0 % Final    Monocytes % 01/22/2024 8.7  2.0 - 10.0 % Final    Eosinophils % 01/22/2024 2.1  0.0 - 6.0 % Final    Basophils % 01/22/2024 0.6  0.0 - 2.0 % Final    Neutrophils Absolute 01/22/2024 3.53  1.20 - 7.70 x10*3/uL Final    Immature Granulocytes Absolute, Au* 01/22/2024 0.02  0.00 - 0.70 x10*3/uL Final    Lymphocytes Absolute 01/22/2024 0.73 (L)  1.20 - 4.80 x10*3/uL Final    Monocytes Absolute 01/22/2024 0.42  0.10 - 1.00 x10*3/uL Final    Eosinophils Absolute 01/22/2024 0.10  0.00 - 0.70 x10*3/uL Final    Basophils Absolute 01/22/2024 0.03  0.00 - 0.10 x10*3/uL Final    Glucose 01/22/2024 179 (H)  74 - 99 mg/dL Final    Sodium 01/22/2024 141  136 - 145 mmol/L Final    Potassium 01/22/2024 3.5  3.5 - 5.3 mmol/L Final    Chloride 01/22/2024 107  98 - 107 mmol/L Final    Bicarbonate 01/22/2024 24  21 - 32 mmol/L Final    Anion Gap 01/22/2024 14  10 - 20 mmol/L Final    Urea Nitrogen 01/22/2024 14  6 - 23 mg/dL Final    Creatinine 01/22/2024 0.90  0.50 - 1.30 mg/dL Final    eGFR 01/22/2024 >90  >60 mL/min/1.73m*2 Final    Calcium 01/22/2024 8.9  8.6 - 10.3 mg/dL Final    Albumin 01/22/2024 4.2  3.4 - 5.0 g/dL Final    Alkaline Phosphatase 01/22/2024 71  33 - 136 U/L Final    Total Protein 01/22/2024 6.7  6.4 - 8.2 g/dL Final    AST 01/22/2024 42 (H)  9 - 39 U/L Final    Bilirubin, Total 01/22/2024 1.2  0.0 - 1.2 mg/dL Final    ALT 01/22/2024 90 (H)  10 - 52 U/L Final    LDH 01/22/2024 170  84 - 246 U/L Final    Color, Urine 01/22/2024 Bernadette (N)  Straw,  Yellow Final    Appearance, Urine 01/22/2024 Hazy (N)  Clear Final    Specific Gravity, Urine 01/22/2024 1.027  1.005 - 1.035 Final    pH, Urine 01/22/2024 5.0  5.0, 5.5, 6.0, 6.5, 7.0, 7.5, 8.0 Final    Protein, Urine 01/22/2024 30 (1+) (N)  NEGATIVE mg/dL Final    Glucose, Urine 01/22/2024 50 (1+) (A)  NEGATIVE mg/dL Final    Blood, Urine 01/22/2024 NEGATIVE  NEGATIVE Final    Ketones, Urine 01/22/2024 20 (1+) (A)  NEGATIVE mg/dL Final    Bilirubin, Urine 01/22/2024 NEGATIVE  NEGATIVE Final    Urobilinogen, Urine 01/22/2024 2.0 (N)  <2.0 mg/dL Final    Nitrite, Urine 01/22/2024 NEGATIVE  NEGATIVE Final    Leukocyte Esterase, Urine 01/22/2024 NEGATIVE  NEGATIVE Final    WBC, Urine 01/22/2024 1-5  1-5, NONE /HPF Final    RBC, Urine 01/22/2024 NONE  NONE, 1-2, 3-5 /HPF Final    Mucus, Urine 01/22/2024 4+  Reference range not established. /LPF Final   Hospital Outpatient Visit on 01/19/2024   Component Date Value Ref Range Status    Treatment Site 01/19/2024 Hip_R   Final    Course Number 01/19/2024 1   Final    Prescribed Fractional Dose 01/19/2024 200  cGray Final    Prescribed Total Dose 01/19/2024 5,000  cGray Final    Actual Fractions Delivered 01/19/2024 23   Final    Prescription Pattern Comment 01/19/2024 Extend CBCT ensure all CTV in field   Final    Actual Session Delivered Dose 01/19/2024 200  cGray Final    Actual Total Dose 01/19/2024 4,600  cGray Final    Prescribed Technique 01/19/2024 VMAT   Final    Elapsed Days 01/19/2024 32   Final    Start Date 01/19/2024 12/18/2023   Final    Last Date 01/19/2024 1/19/2024   Final    Prescribed Number of Fractions 01/19/2024 25   Final   Hospital Outpatient Visit on 01/18/2024   Component Date Value Ref Range Status    Treatment Site 01/18/2024 Hip_R   Final    Course Number 01/18/2024 1   Final    Prescribed Fractional Dose 01/18/2024 200  cGray Final    Prescribed Total Dose 01/18/2024 5,000  cGray Final    Actual Fractions Delivered 01/18/2024 22   Final     Prescription Pattern Comment 01/18/2024 Extend CBCT ensure all CTV in field   Final    Actual Session Delivered Dose 01/18/2024 200  cGray Final    Actual Total Dose 01/18/2024 4,400  cGray Final    Prescribed Technique 01/18/2024 VMAT   Final    Elapsed Days 01/18/2024 31   Final    Start Date 01/18/2024 12/18/2023   Final    Last Date 01/18/2024 1/18/2024   Final    Prescribed Number of Fractions 01/18/2024 25   Final   Hospital Outpatient Visit on 01/17/2024   Component Date Value Ref Range Status    Treatment Site 01/17/2024 Hip_R   Final    Course Number 01/17/2024 1   Final    Prescribed Fractional Dose 01/17/2024 200  cGray Final    Prescribed Total Dose 01/17/2024 5,000  cGray Final    Actual Fractions Delivered 01/17/2024 21   Final    Prescription Pattern Comment 01/17/2024 Extend CBCT ensure all CTV in field   Final    Actual Session Delivered Dose 01/17/2024 200  cGray Final    Actual Total Dose 01/17/2024 4,200  cGray Final    Prescribed Technique 01/17/2024 VMAT   Final    Elapsed Days 01/17/2024 30   Final    Start Date 01/17/2024 12/18/2023   Final    Last Date 01/17/2024 1/17/2024   Final    Prescribed Number of Fractions 01/17/2024 25   Final   Hospital Outpatient Visit on 01/16/2024   Component Date Value Ref Range Status    Treatment Site 01/16/2024 Hip_R   Final    Course Number 01/16/2024 1   Final    Prescribed Fractional Dose 01/16/2024 200  cGray Final    Prescribed Total Dose 01/16/2024 5,000  cGray Final    Actual Fractions Delivered 01/16/2024 20   Final    Prescription Pattern Comment 01/16/2024 Extend CBCT ensure all CTV in field   Final    Actual Session Delivered Dose 01/16/2024 200  cGray Final    Actual Total Dose 01/16/2024 4,000  cGray Final    Prescribed Technique 01/16/2024 VMAT   Final    Elapsed Days 01/16/2024 29   Final    Start Date 01/16/2024 12/18/2023   Final    Last Date 01/16/2024 1/16/2024   Final    Prescribed Number of Fractions 01/16/2024 25   Final   Hospital  Outpatient Visit on 01/15/2024   Component Date Value Ref Range Status    Treatment Site 01/15/2024 Hip_R   Final    Course Number 01/15/2024 1   Final    Prescribed Fractional Dose 01/15/2024 200  cGray Final    Prescribed Total Dose 01/15/2024 5,000  cGray Final    Actual Fractions Delivered 01/15/2024 19   Final    Prescription Pattern Comment 01/15/2024 Extend CBCT ensure all CTV in field   Final    Actual Session Delivered Dose 01/15/2024 200  cGray Final    Actual Total Dose 01/15/2024 3,800  cGray Final    Prescribed Technique 01/15/2024 VMAT   Final    Elapsed Days 01/15/2024 28   Final    Start Date 01/15/2024 12/18/2023   Final    Last Date 01/15/2024 1/15/2024   Final    Prescribed Number of Fractions 01/15/2024 25   Final   Lab on 01/15/2024   Component Date Value Ref Range Status    Thyroid Stimulating Hormone 01/15/2024 4.25 (H)  0.44 - 3.98 mIU/L Final    Color, Urine 01/15/2024 Yellow  Straw, Yellow Final    Appearance, Urine 01/15/2024 Hazy (N)  Clear Final    Specific Gravity, Urine 01/15/2024 1.025  1.005 - 1.035 Final    pH, Urine 01/15/2024 5.0  5.0, 5.5, 6.0, 6.5, 7.0, 7.5, 8.0 Final    Protein, Urine 01/15/2024 30 (1+) (N)  NEGATIVE mg/dL Final    Glucose, Urine 01/15/2024 50 (1+) (A)  NEGATIVE mg/dL Final    Blood, Urine 01/15/2024 NEGATIVE  NEGATIVE Final    Ketones, Urine 01/15/2024 5 (TRACE) (A)  NEGATIVE mg/dL Final    Bilirubin, Urine 01/15/2024 NEGATIVE  NEGATIVE Final    Urobilinogen, Urine 01/15/2024 2.0 (N)  <2.0 mg/dL Final    Nitrite, Urine 01/15/2024 NEGATIVE  NEGATIVE Final    Leukocyte Esterase, Urine 01/15/2024 NEGATIVE  NEGATIVE Final    LDH 01/15/2024 177  84 - 246 U/L Final    Glucose 01/15/2024 176 (H)  74 - 99 mg/dL Final    Sodium 01/15/2024 145  136 - 145 mmol/L Final    Potassium 01/15/2024 3.7  3.5 - 5.3 mmol/L Final    Chloride 01/15/2024 110 (H)  98 - 107 mmol/L Final    Bicarbonate 01/15/2024 25  21 - 32 mmol/L Final    Anion Gap 01/15/2024 14  10 - 20 mmol/L Final     Urea Nitrogen 01/15/2024 17  6 - 23 mg/dL Final    Creatinine 01/15/2024 0.86  0.50 - 1.30 mg/dL Final    eGFR 01/15/2024 >90  >60 mL/min/1.73m*2 Final    Calcium 01/15/2024 8.9  8.6 - 10.3 mg/dL Final    Albumin 01/15/2024 4.2  3.4 - 5.0 g/dL Final    Alkaline Phosphatase 01/15/2024 85  33 - 136 U/L Final    Total Protein 01/15/2024 6.9  6.4 - 8.2 g/dL Final    AST 01/15/2024 82 (H)  9 - 39 U/L Final    Bilirubin, Total 01/15/2024 1.0  0.0 - 1.2 mg/dL Final    ALT 01/15/2024 175 (H)  10 - 52 U/L Final    WBC 01/15/2024 4.5  4.4 - 11.3 x10*3/uL Final    nRBC 01/15/2024 0.0  0.0 - 0.0 /100 WBCs Final    RBC 01/15/2024 5.48  4.50 - 5.90 x10*6/uL Final    Hemoglobin 01/15/2024 14.7  13.5 - 17.5 g/dL Final    Hematocrit 01/15/2024 43.7  41.0 - 52.0 % Final    MCV 01/15/2024 80  80 - 100 fL Final    MCH 01/15/2024 26.8  26.0 - 34.0 pg Final    MCHC 01/15/2024 33.6  32.0 - 36.0 g/dL Final    RDW 01/15/2024 13.9  11.5 - 14.5 % Final    Platelets 01/15/2024 176  150 - 450 x10*3/uL Final    Neutrophils % 01/15/2024 64.7  40.0 - 80.0 % Final    Immature Granulocytes %, Automated 01/15/2024 0.0  0.0 - 0.9 % Final    Lymphocytes % 01/15/2024 19.9  13.0 - 44.0 % Final    Monocytes % 01/15/2024 9.4  2.0 - 10.0 % Final    Eosinophils % 01/15/2024 4.9  0.0 - 6.0 % Final    Basophils % 01/15/2024 1.1  0.0 - 2.0 % Final    Neutrophils Absolute 01/15/2024 2.89  1.20 - 7.70 x10*3/uL Final    Immature Granulocytes Absolute, Au* 01/15/2024 0.00  0.00 - 0.70 x10*3/uL Final    Lymphocytes Absolute 01/15/2024 0.89 (L)  1.20 - 4.80 x10*3/uL Final    Monocytes Absolute 01/15/2024 0.42  0.10 - 1.00 x10*3/uL Final    Eosinophils Absolute 01/15/2024 0.22  0.00 - 0.70 x10*3/uL Final    Basophils Absolute 01/15/2024 0.05  0.00 - 0.10 x10*3/uL Final    WBC, Urine 01/15/2024 NONE  1-5, NONE /HPF Final    RBC, Urine 01/15/2024 NONE  NONE, 1-2, 3-5 /HPF Final    Mucus, Urine 01/15/2024 4+  Reference range not established. /LPF Final    Thyroxine,  Free 01/15/2024 1.04  0.78 - 1.48 ng/dL Final   Hospital Outpatient Visit on 01/12/2024   Component Date Value Ref Range Status    Treatment Site 01/12/2024 Hip_R   Final    Course Number 01/12/2024 1   Final    Prescribed Fractional Dose 01/12/2024 200  cGray Final    Prescribed Total Dose 01/12/2024 5,000  cGray Final    Actual Fractions Delivered 01/12/2024 18   Final    Prescription Pattern Comment 01/12/2024 Extend CBCT ensure all CTV in field   Final    Actual Session Delivered Dose 01/12/2024 200  cGray Final    Actual Total Dose 01/12/2024 3,600  cGray Final    Prescribed Technique 01/12/2024 VMAT   Final    Elapsed Days 01/12/2024 25   Final    Start Date 01/12/2024 12/18/2023   Final    Last Date 01/12/2024 1/12/2024   Final    Prescribed Number of Fractions 01/12/2024 25   Final   There may be more visits with results that are not included.         Radiology Result:  I have reviewed the latest Imaging in PACS and the findings are noted in this note. I discussed the results of the latest imaging with the patient. All previous imaging were reviewed at the time it was completed. Full records are available in the EMR for review as well.     CT CHEST WO IV CONTRAST;  11/17/2023 11:32 am  IMPRESSION:  1.  No evidence of intrathoracic metastatic disease  ==========================================================  MRI of the pelvis with and without IV contrast;  9/29/2023 10:23 am   IMPRESSION:  Complex, heterogeneously enhancing, partially necrotic intramuscular  right gluteus una mass. Imaging characteristics are aggressive  with underlying malignant lesion high in the differential. Recommend  orthopedic oncology consultation and image guided tissue sampling for  further evaluation.     Pathology Results:  I have reviewed the full pathology report recorded in the EMR. The pertinent portions indicating diagnosis are listed here in the note. for details please refer to the full report recorded in the  EMR.    Collected 11/13/2023 09:57    Status: Final result    Right gluteal mass, biopsy:  -- Extraskeletal myxoid chondrosarcoma; see note and synoptic report.  -- Immunohistochemical stains performed on formalin-fixed, paraffin embedded sections demonstrate neoplastic cells to be positive for S100 and INI1 (retained nuclear expression), and negative for EMA, AE1/AE3 and synaptophysin.     Note: Sarcoma fusion NGS panel performed at [facility] shows the presence of a [id number] gene fusion, supporting the above diagnosis.     Assessment and Plan:   Assessment/Plan   Mr. Jason Hollins is a 63 y.o. male with a diagnosis of localized sarcoma. Please see the evolution of the case listed above in the cancer history.     Mr. Hollins started RT on 12/18/23. He increased Pazopanib to 800mg PO daily on 12/20/23. He also had elevation in the blood pressure to a maximum of 150/91 mmHg. He was started on Nifedipine ER 30mg PO daily on 12/19/23. Blood pressures have been 120-130/80's.      Plan:  - Last dose of Pazopanib at 800mg po daily today. No blood on UA. Liver enzymes continue improving.   - Blood test on 02/07/2024.  - Phone visit 02/08/2024.  - Continue Nifedipine ER 30mg PO daily.   - Continue to check BP at home and keep a log of the readings.  - Last day of radiation 01/23/2024.   - Scans 01/31/2024.  - Follow up with Dr. Hawkins 02/07/2024.     DISCLAIMER:   In preparing for this visit and writing this note, I reviewed all the previous electronic medical records (labs, imaging and medical charts) of the patient available in the physician portal. Significant findings which helped in decision making are recorded  in this chart.     The plan was discussed with the patient. We gave him ample opportunities to ask questions. All questions were answered to his satisfaction and he verbalized understanding.      INSTRUCTIONS FOR PATIENT  Mr. Jason Hollins ,  It was a pleasure talking to you today.    As discussed, please STOP  Pazopanib 800mg by mouth daily. Please continue Nifedipine at 30mg by mouth daily and check your blood pressure daily. Please have labs 02/07/2024. I will call you 02/08/2024 with results.    Please make sure to schedule your appointments as we discussed. Your appointments should also appear in your MyChart.   In case of an emergency please dial 911 or report to your nearest Emergency Room.  For all other questions, please do not hesitate to reach out to us at the number listed below.    Thank you for choosing [facility] at [facility].   We appreciate your visit.     MD Siomara Hernandez APRN Taylor Costello, ROMERO ([facility] location)  Maribel Jaffe RN ([place] location)    Sarcoma and Cutaneous Oncology team    Phone: [phone]  Fax: [phone].

## 2024-01-24 NOTE — PROGRESS NOTES
Radiation Oncology Treatment Summary    Patient Name:  Jason Hollins  MRN:  [id number]  :  1960    Referring Provider: Mikal Hawkins MD / Desmond Humphries MD  Primary Care Provider: RAQUEL Gregory    Brief History: Please see the radiation oncology consultation note.  Briefly, Jason Hollins is a 63 y.o. male with Soft tissue sarcoma (CMS/HCC), Clinical: Stage IB (cT3, cN0, cM0, FNCLCC histologic grade: GX).  The patient completed radiotherapy as outlined below.    The radiation planning and treatment procedures were explained to the patient in advance, and all questions were answered. The benefits and goals of treatment, options and alternatives, limitations, side effects and risks of radiation were also explained. The patient provided informed consent.    Technical Summary:  Region(s) Treated: Right hip  Radiation Dose Prescribed: 5000 cGy 25 fractions  Radiation Technique/Machine/Energy Used: VMAT / Truebeam /6 MV photons  Additional radiation technical details available in our radiation oncology EMR MOSAIQ and our treatment planning system.    Radiation Treatment Summary:    Radiation Treatments       Active   No active radiation treatments to show.     Completed   Hip_R (Started on 2023)   Most recent fraction: 200 cGy given on 2024   Total given: 5,000 cGy / 5,000 cGy  (25 of 25 fractions)   Elapsed Days: 36   Technique: VMAT                      Please see the patient's Mosaiq chart for further details regarding the radiation plan, including beam energy.    Concurrent Chemotherapy:  Treatment Plans    Pazopanib by mouth daily with radiation    Clinical Summary:  The patient tolerated this course of radiation therapy relatively well, with no unusual events or unanticipated toxicities. Symptoms during treatment included grade 1 fatigue relieved with rest, mild diarrhea treated with diet modification and high blood pressure related to pazopanib.    CTCAE Toxicity Overview:   Toxicity Assessment           12/27/2023    07:34 1/3/2024    07:28 1/10/2024    07:37 1/17/2024    07:26   Toxicity Assessment   Treatment Site General General General General   Anorexia Grade 0 Grade 0 Grade 1 Grade 0   Anxiety Grade 0 Grade 0 Grade 0 Grade 0   Dehydration Grade 0 Grade 0 Grade 0 Grade 0   Depression Grade 0 Grade 0 Grade 0 Grade 0   Dermatitis Radiation Grade 0 Grade 0 Grade 0 Grade 0   Diarrhea Grade 1       last couple days 1-2 times Grade 1 Grade 1       2 times a day Grade 1       2 times per day   Fatigue Grade 0 Grade 1 Grade 1 Grade 1   Fibrosis Deep Connective Tissue Grade 0 Grade 0 Grade 0 Grade 0   Fracture Grade 0 Grade 0 Grade 0 Grade 0   Nausea Grade 0 Grade 0 Grade 0 Grade 0   Pain Grade 1 Grade 0 Grade 1 Grade 0   Treatment Related Secondary Malignancy Grade 0 Grade 0 Grade 0 Grade 0   Tumor Pain Grade 0 Grade 0 Grade 0 Grade 0   Vomiting Grade 0 Grade 0 Grade 0 Grade 0   Alopecia Grade 0 Grade 0 Grade 0 Grade 0   Erythroderma Grade 0 Grade 0 Grade 0 Grade 0   Pain of Skin Grade 0 Grade 0 Grade 0 Grade 0   Pruritus Grade 0 Grade 0 Grade 0 Grade 0   Rash Acneiform Grade 0 Grade 0 Grade 0 Grade 0   Skin Hyperpigmentation Grade 0 Grade 0 Grade 0 Grade 0   Skin Hypopigmentation Grade 0 Grade 0 Grade 0 Grade 0   Skin Induration Grade 0 Grade 0 Grade 0 Grade 0   Skin Ulceration Grade 0 Grade 0 Grade 0 Grade 0   Telangiectasia Grade 0 Grade 0 Grade 0 Grade 0     Patient Disposition:   The patient will be scheduled for follow-up at our clinic on an as-needed basis.  The patient will follow with orthopedic oncology for resection, and if needed follow-up to radiation oncology and Dr. Humphries for adjuvant radiation therapy based on resection status.  The patient was encouraged to contact us for any questions or concerns in the interim.     Joby Birch MD  [facility] [place]  [facility]  - Department of Radiation Oncology  Phone: [phone]  Fax: [phone]  Epic  secure chat preferred / Pager [phone]

## 2024-01-26 NOTE — PROGRESS NOTES
Radiation Oncology Treatment Summary    Patient Name:  Jason Hollins  MRN:  [id number]  :  1960    Referring Provider: No ref. provider found  Primary Care Provider: RAQUEL Gregory    Brief History: Jason Hollins is a 63 y.o. male with Soft tissue sarcoma (CMS/HCC), Clinical: Stage IB (cT3, cN0, cM0, FNCLCC histologic grade: GX).  The patient completed radiotherapy as outlined below.    Radiation Treatment Summary:    Radiation Treatments       Active   No active radiation treatments to show.     Completed   Hip_R (Started on 2023)   Most recent fraction: 200 cGy given on 2024   Total given: 5,000 cGy / 5,000 cGy  (25 of 25 fractions)   Elapsed Days: 36   Technique: VMAT                     Please see the patient's Mosaiq chart for further details regarding the radiation plan, including beam energy.    Concurrent Chemotherapy:  Treatment Plans       No treatment plans exist          CTCAE Toxicity Overview:   Toxicity Assessment          1/3/2024    07:28 1/10/2024    07:37 2024    07:26   Toxicity Assessment   Treatment Site General General General   Anorexia Grade 0 Grade 1 Grade 0   Anxiety Grade 0 Grade 0 Grade 0   Dehydration Grade 0 Grade 0 Grade 0   Depression Grade 0 Grade 0 Grade 0   Dermatitis Radiation Grade 0 Grade 0 Grade 0   Diarrhea Grade 1 Grade 1       2 times a day Grade 1       2 times per day   Fatigue Grade 1 Grade 1 Grade 1   Fibrosis Deep Connective Tissue Grade 0 Grade 0 Grade 0   Fracture Grade 0 Grade 0 Grade 0   Nausea Grade 0 Grade 0 Grade 0   Pain Grade 0 Grade 1 Grade 0   Treatment Related Secondary Malignancy Grade 0 Grade 0 Grade 0   Tumor Pain Grade 0 Grade 0 Grade 0   Vomiting Grade 0 Grade 0 Grade 0   Alopecia Grade 0 Grade 0 Grade 0   Erythroderma Grade 0 Grade 0 Grade 0   Pain of Skin Grade 0 Grade 0 Grade 0   Pruritus Grade 0 Grade 0 Grade 0   Rash Acneiform Grade 0 Grade 0 Grade 0   Skin Hyperpigmentation Grade 0 Grade 0 Grade 0   Skin Hypopigmentation  Grade 0 Grade 0 Grade 0   Skin Induration Grade 0 Grade 0 Grade 0   Skin Ulceration Grade 0 Grade 0 Grade 0   Telangiectasia Grade 0 Grade 0 Grade 0     Patient Disposition: patient may follow-up on an as-needed basis.

## 2024-01-31 ENCOUNTER — LAB (OUTPATIENT)
Dept: LAB | Facility: LAB | Age: 64
End: 2024-01-31
Payer: COMMERCIAL

## 2024-01-31 ENCOUNTER — ANCILLARY PROCEDURE (OUTPATIENT)
Dept: RADIOLOGY | Facility: CLINIC | Age: 64
End: 2024-01-31
Payer: COMMERCIAL

## 2024-01-31 ENCOUNTER — HOSPITAL ENCOUNTER (OUTPATIENT)
Dept: RADIOLOGY | Facility: HOSPITAL | Age: 64
Discharge: HOME | End: 2024-01-31
Payer: COMMERCIAL

## 2024-01-31 DIAGNOSIS — C49.9 SOFT TISSUE SARCOMA (MULTI): ICD-10-CM

## 2024-01-31 DIAGNOSIS — R53.0 NEOPLASTIC MALIGNANT RELATED FATIGUE: ICD-10-CM

## 2024-01-31 DIAGNOSIS — Z79.899 ENCOUNTER FOR MONITORING CARDIOTOXIC DRUG THERAPY: ICD-10-CM

## 2024-01-31 DIAGNOSIS — Z51.81 ENCOUNTER FOR MONITORING CARDIOTOXIC DRUG THERAPY: ICD-10-CM

## 2024-01-31 LAB
ALBUMIN SERPL BCP-MCNC: 3.7 G/DL (ref 3.4–5)
ALP SERPL-CCNC: 83 U/L (ref 33–136)
ALT SERPL W P-5'-P-CCNC: 44 U/L (ref 10–52)
ANION GAP SERPL CALC-SCNC: 16 MMOL/L (ref 10–20)
APPEARANCE UR: ABNORMAL
AST SERPL W P-5'-P-CCNC: 31 U/L (ref 9–39)
BACTERIA #/AREA URNS AUTO: ABNORMAL /HPF
BASOPHILS # BLD AUTO: 0.02 X10*3/UL (ref 0–0.1)
BASOPHILS NFR BLD AUTO: 0.4 %
BILIRUB SERPL-MCNC: 1.1 MG/DL (ref 0–1.2)
BILIRUB UR STRIP.AUTO-MCNC: NEGATIVE MG/DL
BUN SERPL-MCNC: 15 MG/DL (ref 6–23)
CALCIUM SERPL-MCNC: 8.9 MG/DL (ref 8.6–10.3)
CHLORIDE SERPL-SCNC: 105 MMOL/L (ref 98–107)
CO2 SERPL-SCNC: 22 MMOL/L (ref 21–32)
COLOR UR: ABNORMAL
CREAT SERPL-MCNC: 0.9 MG/DL (ref 0.5–1.3)
EGFRCR SERPLBLD CKD-EPI 2021: >90 ML/MIN/1.73M*2
EOSINOPHIL # BLD AUTO: 0.23 X10*3/UL (ref 0–0.7)
EOSINOPHIL NFR BLD AUTO: 4 %
ERYTHROCYTE [DISTWIDTH] IN BLOOD BY AUTOMATED COUNT: 15.3 % (ref 11.5–14.5)
GLUCOSE SERPL-MCNC: 182 MG/DL (ref 74–99)
GLUCOSE UR STRIP.AUTO-MCNC: NEGATIVE MG/DL
HCT VFR BLD AUTO: 42.6 % (ref 41–52)
HGB BLD-MCNC: 14.4 G/DL (ref 13.5–17.5)
IMM GRANULOCYTES # BLD AUTO: 0.02 X10*3/UL (ref 0–0.7)
IMM GRANULOCYTES NFR BLD AUTO: 0.4 % (ref 0–0.9)
KETONES UR STRIP.AUTO-MCNC: ABNORMAL MG/DL
LDH SERPL L TO P-CCNC: 161 U/L (ref 84–246)
LEUKOCYTE ESTERASE UR QL STRIP.AUTO: NEGATIVE
LYMPHOCYTES # BLD AUTO: 0.68 X10*3/UL (ref 1.2–4.8)
LYMPHOCYTES NFR BLD AUTO: 11.9 %
MCH RBC QN AUTO: 27.7 PG (ref 26–34)
MCHC RBC AUTO-ENTMCNC: 33.8 G/DL (ref 32–36)
MCV RBC AUTO: 82 FL (ref 80–100)
MONOCYTES # BLD AUTO: 0.5 X10*3/UL (ref 0.1–1)
MONOCYTES NFR BLD AUTO: 8.8 %
MUCOUS THREADS #/AREA URNS AUTO: ABNORMAL /LPF
NEUTROPHILS # BLD AUTO: 4.25 X10*3/UL (ref 1.2–7.7)
NEUTROPHILS NFR BLD AUTO: 74.5 %
NITRITE UR QL STRIP.AUTO: NEGATIVE
NRBC BLD-RTO: 0 /100 WBCS (ref 0–0)
PH UR STRIP.AUTO: 5 [PH]
PLATELET # BLD AUTO: 200 X10*3/UL (ref 150–450)
POTASSIUM SERPL-SCNC: 3.7 MMOL/L (ref 3.5–5.3)
PROT SERPL-MCNC: 6.4 G/DL (ref 6.4–8.2)
PROT UR STRIP.AUTO-MCNC: ABNORMAL MG/DL
RBC # BLD AUTO: 5.2 X10*6/UL (ref 4.5–5.9)
RBC # UR STRIP.AUTO: NEGATIVE /UL
RBC #/AREA URNS AUTO: ABNORMAL /HPF
SODIUM SERPL-SCNC: 139 MMOL/L (ref 136–145)
SP GR UR STRIP.AUTO: 1.03
TSH SERPL-ACNC: 2.53 MIU/L (ref 0.44–3.98)
UROBILINOGEN UR STRIP.AUTO-MCNC: 2 MG/DL
WBC # BLD AUTO: 5.7 X10*3/UL (ref 4.4–11.3)
WBC #/AREA URNS AUTO: ABNORMAL /HPF

## 2024-01-31 PROCEDURE — 85025 COMPLETE CBC W/AUTO DIFF WBC: CPT

## 2024-01-31 PROCEDURE — 81001 URINALYSIS AUTO W/SCOPE: CPT

## 2024-01-31 PROCEDURE — 72197 MRI PELVIS W/O & W/DYE: CPT

## 2024-01-31 PROCEDURE — A9575 INJ GADOTERATE MEGLUMI 0.1ML: HCPCS | Performed by: STUDENT IN AN ORGANIZED HEALTH CARE EDUCATION/TRAINING PROGRAM

## 2024-01-31 PROCEDURE — 2550000001 HC RX 255 CONTRASTS: Performed by: STUDENT IN AN ORGANIZED HEALTH CARE EDUCATION/TRAINING PROGRAM

## 2024-01-31 PROCEDURE — 84443 ASSAY THYROID STIM HORMONE: CPT

## 2024-01-31 PROCEDURE — 83615 LACTATE (LD) (LDH) ENZYME: CPT

## 2024-01-31 PROCEDURE — 72197 MRI PELVIS W/O & W/DYE: CPT | Performed by: RADIOLOGY

## 2024-01-31 PROCEDURE — 80053 COMPREHEN METABOLIC PANEL: CPT

## 2024-01-31 PROCEDURE — 71250 CT THORAX DX C-: CPT | Performed by: RADIOLOGY

## 2024-01-31 PROCEDURE — 36415 COLL VENOUS BLD VENIPUNCTURE: CPT

## 2024-01-31 PROCEDURE — 71250 CT THORAX DX C-: CPT

## 2024-01-31 RX ORDER — GADOTERATE MEGLUMINE 376.9 MG/ML
20 INJECTION INTRAVENOUS
Status: COMPLETED | OUTPATIENT
Start: 2024-01-31 | End: 2024-01-31

## 2024-01-31 RX ADMIN — GADOTERATE MEGLUMINE 20 ML: 376.9 INJECTION INTRAVENOUS at 16:14

## 2024-02-07 ENCOUNTER — OFFICE VISIT (OUTPATIENT)
Dept: ORTHOPEDIC SURGERY | Facility: CLINIC | Age: 64
End: 2024-02-07
Payer: COMMERCIAL

## 2024-02-07 VITALS — WEIGHT: 261 LBS | BODY MASS INDEX: 37.37 KG/M2 | HEIGHT: 70 IN

## 2024-02-07 DIAGNOSIS — C49.9 SOFT TISSUE SARCOMA (MULTI): ICD-10-CM

## 2024-02-07 DIAGNOSIS — C49.9 EXTRASKELETAL MYXOID CHONDROSARCOMA (MULTI): Primary | ICD-10-CM

## 2024-02-07 PROCEDURE — 1036F TOBACCO NON-USER: CPT | Performed by: STUDENT IN AN ORGANIZED HEALTH CARE EDUCATION/TRAINING PROGRAM

## 2024-02-07 PROCEDURE — 99214 OFFICE O/P EST MOD 30 MIN: CPT | Performed by: STUDENT IN AN ORGANIZED HEALTH CARE EDUCATION/TRAINING PROGRAM

## 2024-02-07 PROCEDURE — 3008F BODY MASS INDEX DOCD: CPT | Performed by: STUDENT IN AN ORGANIZED HEALTH CARE EDUCATION/TRAINING PROGRAM

## 2024-02-07 RX ORDER — OMEGA-3-ACID ETHYL ESTERS 1 G/1
2 CAPSULE, LIQUID FILLED ORAL DAILY
Status: ON HOLD | COMMUNITY
Start: 2017-05-16 | End: 2024-03-05 | Stop reason: ALTCHOICE

## 2024-02-07 ASSESSMENT — ENCOUNTER SYMPTOMS
DEPRESSION: 0
OCCASIONAL FEELINGS OF UNSTEADINESS: 0
LOSS OF SENSATION IN FEET: 0

## 2024-02-07 NOTE — PROGRESS NOTES
[facility]opa[facility] Clinic Progress Note:    CC: Right gluteal extraskeletal myxoid chondrosarcoma    S: 63 y.o. male with a history of biopsy-proven right gluteal extraskeletal myxoid chondrosarcoma being treated with neoadjuvant postoperative as well as radiation therapy.  He completed his radiation on 1/23/2024 and is here to discuss surgical consolidation at this time.  He is gotten repeat imaging of the pelvis as well as CT scan of his chest to monitor for local disease as well as systemic disease.  He did have significant radiation changes and dermal burns for secondary to the radiation.  Currently he states his symptoms are well-controlled.  He thinks that the skin changes are gradually getting better.  Denies any numbness or tingling.  No sciatic nerve symptoms.  Otherwise doing well.    Objective:    Exam:  Gen: alert, appropriately conversational, no apparent distress  Chest: symmetric chest rise, non-labored breathing  Heart: RRR to peripheral palpation    RLE:   -There is a large approximately 15 cm mass within the right gluteal compartment that is firm, nonmobile, and tender to the touch.  Skin shows significant changes from radiation effects.  No open wounds.  - Hip ROM from 0-110. Ambulates without abnormal gait  -Motor intact in DF/PF/EHL/FHL, SILT in saph/sural/SPN/DPN/tibial distributions  -Foot wwp, brisk cap refill, 2+ DP/PT pulse  -Biopsy tract is healed without any evidence of drainage    Imaging:  CT scan of the chest was obtained which demonstrates no evidence of intrapulmonary/metastatic disease.  MRI of his pelvis with and without contrast redemonstrates large gluteal mass within the right gluteus una with overall stable appearing dimensions, potentially slightly smaller compared to previous.  While in close proximity to sciatic nerve there appears to be a good plane between the tumor/gluteus una muscle belly and the nerve.    A/P:    I discussed with the patient at this time is  time to proceed to surgical consolidation we typically aim for 4 to 6 weeks from the time of radiation ending.  Right now he states the best time for him to have this done will be the first week of March which would be appropriate given that that is the 6 weeks from radiation and time.  As well as to give his skin a bit more time to heal up after the significant dose of radiation he got given the changes we see around the gluteus area.  I told him I would like for him to come back and see us the last week of February to make sure that the radiation changes continue to resolve and he is appropriate for surgery the first week of March.  We discussed that as a part of surgery we will certainly dissect out the sciatic nerve and given the radiation of the area there is a chance that the developing neuropraxia just due to previous sensitization secondary to radiation but we would obviously do our best to minimize any trauma to the nerve.  We also discussed that in order to decrease the soft tissue defect and try to pad the area for future sitting he likely needs flap reconstruction by plastic surgery.  I am in a reach out to one of my colleagues to coordinate operative dates.  Risk that were discussed with patient in regards to surgery included but were not limited to risk of infection, blood loss, DVT and pulmonary embolism, failure of surgery need for future revisions, damage to local structures, complications of all incisional wound dehiscence, seroma, change in diagnosis of final pathology, local progression/recurrence, static nerve palsy, as well as chronic pain.  He was ultimately agreeable with these risks given the potential benefits and wishes to proceed.

## 2024-02-08 ENCOUNTER — APPOINTMENT (OUTPATIENT)
Dept: HEMATOLOGY/ONCOLOGY | Facility: CLINIC | Age: 64
End: 2024-02-08
Payer: COMMERCIAL

## 2024-02-13 ENCOUNTER — TELEPHONE (OUTPATIENT)
Dept: HEMATOLOGY/ONCOLOGY | Facility: HOSPITAL | Age: 64
End: 2024-02-13
Payer: COMMERCIAL

## 2024-02-13 ENCOUNTER — LAB (OUTPATIENT)
Dept: LAB | Facility: LAB | Age: 64
End: 2024-02-13
Payer: COMMERCIAL

## 2024-02-13 ENCOUNTER — APPOINTMENT (OUTPATIENT)
Dept: HEMATOLOGY/ONCOLOGY | Facility: HOSPITAL | Age: 64
End: 2024-02-13
Payer: COMMERCIAL

## 2024-02-13 ENCOUNTER — TELEPHONE (OUTPATIENT)
Dept: ADMISSION | Facility: HOSPITAL | Age: 64
End: 2024-02-13

## 2024-02-13 ENCOUNTER — TELEPHONE (OUTPATIENT)
Dept: HEMATOLOGY/ONCOLOGY | Facility: HOSPITAL | Age: 64
End: 2024-02-13

## 2024-02-13 DIAGNOSIS — C49.9 SOFT TISSUE SARCOMA (MULTI): ICD-10-CM

## 2024-02-13 DIAGNOSIS — C49.9 SOFT TISSUE SARCOMA (MULTI): Primary | ICD-10-CM

## 2024-02-13 PROCEDURE — 36415 COLL VENOUS BLD VENIPUNCTURE: CPT

## 2024-02-13 PROCEDURE — 82977 ASSAY OF GGT: CPT

## 2024-02-13 PROCEDURE — 83615 LACTATE (LD) (LDH) ENZYME: CPT

## 2024-02-13 PROCEDURE — 81001 URINALYSIS AUTO W/SCOPE: CPT

## 2024-02-13 PROCEDURE — 85025 COMPLETE CBC W/AUTO DIFF WBC: CPT

## 2024-02-13 PROCEDURE — 80053 COMPREHEN METABOLIC PANEL: CPT

## 2024-02-13 NOTE — TELEPHONE ENCOUNTER
Patient returned call and stated he was unable to get his labs earlier because the lab did not have the orders yet and he could not wait any longer as he had to return to work. Advised patient orders were in and he could get labs now without issue. Patient agreeable and stated he would be getting his labs today.

## 2024-02-13 NOTE — TELEPHONE ENCOUNTER
Patient at [facility] needs orders placed. Spoke with RN Partner, placing orders now, patient aware

## 2024-02-13 NOTE — TELEPHONE ENCOUNTER
Left message for patient re:labs. Advised him to call back and let us know if he got labs drawn today.

## 2024-02-13 NOTE — TELEPHONE ENCOUNTER
I spoke to Mr. Hollins. He has been doing well so far. He stopped his blood pressure medicine the day he stopped pazopanib. His pain is also better. He is going to meet Dr. Hawkins and plastic surgery in last week of February 2024. His surgery is planned in the first week of March 2024. We will meet him again on April 1st week in [place], [place]    Chiki Carbajal MD    Hematology and Oncology    Phone: [phone]  Fax: [phone]

## 2024-02-14 LAB
ALBUMIN SERPL BCP-MCNC: 4.3 G/DL (ref 3.4–5)
ALP SERPL-CCNC: 79 U/L (ref 33–136)
ALT SERPL W P-5'-P-CCNC: 37 U/L (ref 10–52)
AMORPH CRY #/AREA UR COMP ASSIST: ABNORMAL /HPF
ANION GAP SERPL CALC-SCNC: 15 MMOL/L (ref 10–20)
APPEARANCE UR: ABNORMAL
AST SERPL W P-5'-P-CCNC: 25 U/L (ref 9–39)
BASOPHILS # BLD AUTO: 0.05 X10*3/UL (ref 0–0.1)
BASOPHILS NFR BLD AUTO: 0.9 %
BILIRUB SERPL-MCNC: 0.8 MG/DL (ref 0–1.2)
BILIRUB UR STRIP.AUTO-MCNC: NEGATIVE MG/DL
BUN SERPL-MCNC: 13 MG/DL (ref 6–23)
CALCIUM SERPL-MCNC: 9.9 MG/DL (ref 8.6–10.6)
CHLORIDE SERPL-SCNC: 106 MMOL/L (ref 98–107)
CO2 SERPL-SCNC: 26 MMOL/L (ref 21–32)
COLOR UR: ABNORMAL
CREAT SERPL-MCNC: 0.82 MG/DL (ref 0.5–1.3)
EGFRCR SERPLBLD CKD-EPI 2021: >90 ML/MIN/1.73M*2
EOSINOPHIL # BLD AUTO: 0.12 X10*3/UL (ref 0–0.7)
EOSINOPHIL NFR BLD AUTO: 2.2 %
ERYTHROCYTE [DISTWIDTH] IN BLOOD BY AUTOMATED COUNT: 15.2 % (ref 11.5–14.5)
GGT SERPL-CCNC: 21 U/L (ref 5–64)
GLUCOSE SERPL-MCNC: 131 MG/DL (ref 74–99)
GLUCOSE UR STRIP.AUTO-MCNC: NORMAL MG/DL
HCT VFR BLD AUTO: 43.9 % (ref 41–52)
HGB BLD-MCNC: 14.2 G/DL (ref 13.5–17.5)
IMM GRANULOCYTES # BLD AUTO: 0.06 X10*3/UL (ref 0–0.7)
IMM GRANULOCYTES NFR BLD AUTO: 1.1 % (ref 0–0.9)
KETONES UR STRIP.AUTO-MCNC: NEGATIVE MG/DL
LDH SERPL L TO P-CCNC: 135 U/L (ref 84–246)
LEUKOCYTE ESTERASE UR QL STRIP.AUTO: NEGATIVE
LYMPHOCYTES # BLD AUTO: 0.88 X10*3/UL (ref 1.2–4.8)
LYMPHOCYTES NFR BLD AUTO: 15.9 %
MCH RBC QN AUTO: 27.8 PG (ref 26–34)
MCHC RBC AUTO-ENTMCNC: 32.3 G/DL (ref 32–36)
MCV RBC AUTO: 86 FL (ref 80–100)
MONOCYTES # BLD AUTO: 0.52 X10*3/UL (ref 0.1–1)
MONOCYTES NFR BLD AUTO: 9.4 %
NEUTROPHILS # BLD AUTO: 3.89 X10*3/UL (ref 1.2–7.7)
NEUTROPHILS NFR BLD AUTO: 70.5 %
NITRITE UR QL STRIP.AUTO: NEGATIVE
NRBC BLD-RTO: 0 /100 WBCS (ref 0–0)
PH UR STRIP.AUTO: 5 [PH]
PLATELET # BLD AUTO: 219 X10*3/UL (ref 150–450)
POTASSIUM SERPL-SCNC: 4 MMOL/L (ref 3.5–5.3)
PROT SERPL-MCNC: 6.9 G/DL (ref 6.4–8.2)
PROT UR STRIP.AUTO-MCNC: NEGATIVE MG/DL
RBC # BLD AUTO: 5.11 X10*6/UL (ref 4.5–5.9)
RBC # UR STRIP.AUTO: ABNORMAL /UL
RBC #/AREA URNS AUTO: ABNORMAL /HPF
SODIUM SERPL-SCNC: 143 MMOL/L (ref 136–145)
SP GR UR STRIP.AUTO: 1.02
UROBILINOGEN UR STRIP.AUTO-MCNC: NORMAL MG/DL
WBC # BLD AUTO: 5.5 X10*3/UL (ref 4.4–11.3)
WBC #/AREA URNS AUTO: ABNORMAL /HPF

## 2024-02-15 ENCOUNTER — PREP FOR PROCEDURE (OUTPATIENT)
Dept: ORTHOPEDIC SURGERY | Facility: HOSPITAL | Age: 64
End: 2024-02-15
Payer: COMMERCIAL

## 2024-02-15 DIAGNOSIS — C49.9 EXTRASKELETAL MYXOID CHONDROSARCOMA (MULTI): Primary | ICD-10-CM

## 2024-02-16 ENCOUNTER — HOSPITAL ENCOUNTER (OUTPATIENT)
Facility: HOSPITAL | Age: 64
Setting detail: SURGERY ADMIT
End: 2024-02-16
Attending: STUDENT IN AN ORGANIZED HEALTH CARE EDUCATION/TRAINING PROGRAM | Admitting: STUDENT IN AN ORGANIZED HEALTH CARE EDUCATION/TRAINING PROGRAM
Payer: COMMERCIAL

## 2024-02-16 PROBLEM — C49.9: Status: ACTIVE | Noted: 2024-02-07

## 2024-02-16 RX ORDER — CEFAZOLIN SODIUM 2 G/100ML
2 INJECTION, SOLUTION INTRAVENOUS ONCE
Status: CANCELLED | OUTPATIENT
Start: 2024-02-16 | End: 2024-02-16

## 2024-02-20 NOTE — PROGRESS NOTES
Subjective   Patient ID: Jason Hollins is a 63 y.o. male referral from Dr. Hawkins.    HPI 63 y.o. male with a history type II DM with last HbA1c of 7.1 in November 2023, obsesity with current BMI of ++,of biopsy-proven right gluteal extraskeletal myxoid chondrosarcoma being treated with neoadjuvant postoperative as well as radiation therapy.  He completed his radiation on 1/23/2024 and will have surgical consolidation with Dr. Hawkins on 3/5/24. He is here to discuss flap reconstruction.     Review of Systems   Constitutional: Negative.    HENT: Negative.     Eyes: Negative.    Respiratory: Negative.     Cardiovascular: Negative.    Gastrointestinal: Negative.    Musculoskeletal: Negative.         Right gluteal mass, hard and firm, with skin discoloration.    Skin:  Positive for color change. Negative for wound.   Neurological: Negative.    Hematological: Negative.    Psychiatric/Behavioral: Negative.     All other systems reviewed and are negative.      Objective   Physical Exam  Vitals and nursing note reviewed. Exam conducted with a chaperone present.   Constitutional:       Appearance: Normal appearance.   HENT:      Head: Normocephalic and atraumatic.      Nose: Nose normal.   Eyes:      Extraocular Movements: Extraocular movements intact.      Conjunctiva/sclera: Conjunctivae normal.      Pupils: Pupils are equal, round, and reactive to light.   Cardiovascular:      Rate and Rhythm: Normal rate and regular rhythm.   Pulmonary:      Effort: Pulmonary effort is normal.      Breath sounds: Normal breath sounds.   Abdominal:      Palpations: Abdomen is soft.   Musculoskeletal:         General: Normal range of motion.      Cervical back: Normal range of motion and neck supple.      Comments: Large, hard, non painful, non mobile mass occupying most of the right gluteal region with skin discoloration.    Skin:     General: Skin is warm.      Coloration: Skin is not pale.      Findings: No bruising.      Comments:  Discoloration of the right gluteal skin   Neurological:      General: No focal deficit present.      Mental Status: He is alert and oriented to person, place, and time.   Psychiatric:         Mood and Affect: Mood normal.         Behavior: Behavior normal.         Thought Content: Thought content normal.         Judgment: Judgment normal.         Assessment/Plan   There are no diagnoses linked to this encounter.    Mr. Hollins is a very pleasant man. I saw him today accompanied with his support person. He was referred to me by my esteemed colleague Dr. Hawkins for simultaneous sarcoma excision and soft tissue reconstruction.   I reviewed with patient the extent of excision and involved structures, and discussed soft tissue reconstruction options including the use of pedicle flaps, free flaps, and combined pedicle and free flaps. Pros and cons of each were discussed. I also reviewed his CT abdomen and pelvis and noted size bale medial row perforators for left IDEA.   We also reviewed flaps donor sites including the ALT, VIRY/TRAM, LD and lumbar artery  flap. Pros and cons of each were discussed to a greater length.   In my opinion the patient will likely need two pedicle flaps from the back and thigh or one free flap from the abdomen. The decision will depend on recipient vessels availability, perforators quality, and the actual defect size. Most likely this will be done in one setting in sequential approach. However two stage reconstruction was also presented.    Next, I reviewed with patients possible complications including infection, seroma, bleeding and hematoma, injury to surrounding tissues, partial and total flap necrosis, wound healing issues, need for additional procedures, sensory disturbances at the reconstruction site or flap donor site, chronic pain, dissatisfaction with results, and need for additional surgeries. We also discussed the need for blood transfusion and need for blood thinners and  their potential complications. Medical complications including but not limited to reaction to medication, DVT and PE, cardiac complications were reviewed with the patient. Post operative recovery and rehab were discussed to a greater length.    Patient expressed good understanding, asked appropriate questions which I answered to the best of my understanding. He'd like to proceed with surgery.     Plan: Surgery is arranged for next Tuesday. I'd like to obtain abdomen and pelvis and right lower extremity CTA to optimize flap planning.

## 2024-02-26 ENCOUNTER — OFFICE VISIT (OUTPATIENT)
Dept: PLASTIC SURGERY | Facility: CLINIC | Age: 64
End: 2024-02-26
Payer: COMMERCIAL

## 2024-02-26 VITALS
BODY MASS INDEX: 37.62 KG/M2 | HEART RATE: 68 BPM | DIASTOLIC BLOOD PRESSURE: 75 MMHG | SYSTOLIC BLOOD PRESSURE: 126 MMHG | HEIGHT: 70 IN | WEIGHT: 262.8 LBS

## 2024-02-26 DIAGNOSIS — C49.9 SOFT TISSUE SARCOMA (MULTI): ICD-10-CM

## 2024-02-26 PROCEDURE — 3078F DIAST BP <80 MM HG: CPT

## 2024-02-26 PROCEDURE — 3008F BODY MASS INDEX DOCD: CPT

## 2024-02-26 PROCEDURE — 1036F TOBACCO NON-USER: CPT

## 2024-02-26 PROCEDURE — 99204 OFFICE O/P NEW MOD 45 MIN: CPT

## 2024-02-26 PROCEDURE — 3074F SYST BP LT 130 MM HG: CPT

## 2024-02-26 ASSESSMENT — ENCOUNTER SYMPTOMS
PSYCHIATRIC NEGATIVE: 1
CONSTITUTIONAL NEGATIVE: 1
HEMATOLOGIC/LYMPHATIC NEGATIVE: 1
EYES NEGATIVE: 1
CARDIOVASCULAR NEGATIVE: 1
WOUND: 0
GASTROINTESTINAL NEGATIVE: 1
RESPIRATORY NEGATIVE: 1
MUSCULOSKELETAL NEGATIVE: 1
NEUROLOGICAL NEGATIVE: 1
COLOR CHANGE: 1

## 2024-02-27 ENCOUNTER — HOSPITAL ENCOUNTER (OUTPATIENT)
Dept: RADIOLOGY | Facility: CLINIC | Age: 64
Discharge: HOME | End: 2024-02-27
Payer: COMMERCIAL

## 2024-02-27 ENCOUNTER — APPOINTMENT (OUTPATIENT)
Dept: RADIOLOGY | Facility: HOSPITAL | Age: 64
End: 2024-02-27
Payer: COMMERCIAL

## 2024-02-27 DIAGNOSIS — C49.9 SOFT TISSUE SARCOMA (MULTI): ICD-10-CM

## 2024-02-27 PROCEDURE — 75635 CT ANGIO ABDOMINAL ARTERIES: CPT

## 2024-02-28 ENCOUNTER — OFFICE VISIT (OUTPATIENT)
Dept: ORTHOPEDIC SURGERY | Facility: CLINIC | Age: 64
End: 2024-02-28
Payer: COMMERCIAL

## 2024-02-28 ENCOUNTER — PRE-ADMISSION TESTING (OUTPATIENT)
Dept: PREADMISSION TESTING | Facility: HOSPITAL | Age: 64
End: 2024-02-28
Payer: COMMERCIAL

## 2024-02-28 VITALS
OXYGEN SATURATION: 97 % | SYSTOLIC BLOOD PRESSURE: 112 MMHG | TEMPERATURE: 97.3 F | BODY MASS INDEX: 37.81 KG/M2 | WEIGHT: 264.1 LBS | DIASTOLIC BLOOD PRESSURE: 73 MMHG | HEART RATE: 83 BPM | HEIGHT: 70 IN

## 2024-02-28 VITALS — BODY MASS INDEX: 37.8 KG/M2 | WEIGHT: 264 LBS | HEIGHT: 70 IN

## 2024-02-28 DIAGNOSIS — C49.9 SOFT TISSUE SARCOMA (MULTI): ICD-10-CM

## 2024-02-28 DIAGNOSIS — E11.9 DM TYPE 2 WITHOUT RETINOPATHY (MULTI): ICD-10-CM

## 2024-02-28 DIAGNOSIS — Z01.818 PREOP EXAMINATION: Primary | ICD-10-CM

## 2024-02-28 DIAGNOSIS — C49.9 SOFT TISSUE SARCOMA (MULTI): Primary | ICD-10-CM

## 2024-02-28 DIAGNOSIS — C49.9 EXTRASKELETAL MYXOID CHONDROSARCOMA (MULTI): ICD-10-CM

## 2024-02-28 LAB
ABO GROUP (TYPE) IN BLOOD: NORMAL
ANTIBODY SCREEN: NORMAL
APTT PPP: 30 SECONDS (ref 27–38)
EST. AVERAGE GLUCOSE BLD GHB EST-MCNC: 169 MG/DL
HBA1C MFR BLD: 7.5 %
INR PPP: 1 (ref 0.9–1.1)
PROTHROMBIN TIME: 11 SECONDS (ref 9.8–12.8)
RH FACTOR (ANTIGEN D): NORMAL

## 2024-02-28 PROCEDURE — 3008F BODY MASS INDEX DOCD: CPT | Performed by: STUDENT IN AN ORGANIZED HEALTH CARE EDUCATION/TRAINING PROGRAM

## 2024-02-28 PROCEDURE — 83036 HEMOGLOBIN GLYCOSYLATED A1C: CPT

## 2024-02-28 PROCEDURE — 86901 BLOOD TYPING SEROLOGIC RH(D): CPT

## 2024-02-28 PROCEDURE — 85610 PROTHROMBIN TIME: CPT

## 2024-02-28 PROCEDURE — 1036F TOBACCO NON-USER: CPT | Performed by: STUDENT IN AN ORGANIZED HEALTH CARE EDUCATION/TRAINING PROGRAM

## 2024-02-28 PROCEDURE — 99214 OFFICE O/P EST MOD 30 MIN: CPT | Performed by: STUDENT IN AN ORGANIZED HEALTH CARE EDUCATION/TRAINING PROGRAM

## 2024-02-28 PROCEDURE — 93005 ELECTROCARDIOGRAM TRACING: CPT

## 2024-02-28 PROCEDURE — 36415 COLL VENOUS BLD VENIPUNCTURE: CPT

## 2024-02-28 PROCEDURE — 87081 CULTURE SCREEN ONLY: CPT

## 2024-02-28 PROCEDURE — 3051F HG A1C>EQUAL 7.0%<8.0%: CPT | Performed by: STUDENT IN AN ORGANIZED HEALTH CARE EDUCATION/TRAINING PROGRAM

## 2024-02-28 PROCEDURE — 99204 OFFICE O/P NEW MOD 45 MIN: CPT | Performed by: NURSE PRACTITIONER

## 2024-02-28 RX ORDER — CHLORHEXIDINE GLUCONATE 40 MG/ML
SOLUTION TOPICAL 2 TIMES DAILY
Qty: 473 ML | Refills: 0 | Status: SHIPPED | OUTPATIENT
Start: 2024-02-28 | End: 2024-03-04

## 2024-02-28 RX ORDER — CHLORHEXIDINE GLUCONATE ORAL RINSE 1.2 MG/ML
15 SOLUTION DENTAL SEE ADMIN INSTRUCTIONS
Qty: 473 ML | Refills: 0 | Status: SHIPPED | OUTPATIENT
Start: 2024-02-28 | End: 2024-04-27

## 2024-02-28 ASSESSMENT — DUKE ACTIVITY SCORE INDEX (DASI)
CAN YOU DO MODERATE WORK AROUND THE HOUSE LIKE VACUUMING, SWEEPING FLOORS OR CARRYING GROCERIES: YES
CAN YOU HAVE SEXUAL RELATIONS: YES
CAN YOU DO LIGHT WORK AROUND THE HOUSE LIKE DUSTING OR WASHING DISHES: YES
CAN YOU DO YARD WORK LIKE RAKING LEAVES, WEEDING OR PUSHING A MOWER: YES
CAN YOU CLIMB A FLIGHT OF STAIRS OR WALK UP A HILL: YES
CAN YOU PARTICIPATE IN MODERATE RECREATIONAL ACTIVITIES LIKE GOLF, BOWLING, DANCING, DOUBLES TENNIS OR THROWING A BASEBALL OR FOOTBALL: YES
CAN YOU TAKE CARE OF YOURSELF (EAT, DRESS, BATHE, OR USE TOILET): YES
CAN YOU WALK A BLOCK OR TWO ON LEVEL GROUND: YES
CAN YOU RUN A SHORT DISTANCE: YES
CAN YOU WALK INDOORS, SUCH AS AROUND YOUR HOUSE: YES
CAN YOU DO HEAVY WORK AROUND THE HOUSE LIKE SCRUBBING FLOORS OR LIFTING AND MOVING HEAVY FURNITURE: YES
CAN YOU PARTICIPATE IN STRENOUS SPORTS LIKE SWIMMING, SINGLES TENNIS, FOOTBALL, BASKETBALL, OR SKIING: NO
DASI METS SCORE: 9
TOTAL_SCORE: 50.7

## 2024-02-28 ASSESSMENT — CHADS2 SCORE
DIABETES: YES
CHADS2 SCORE: 2
CHF: NO
AGE GREATER THAN OR EQUAL TO 75: NO
PRIOR STROKE OR TIA OR THROMBOEMBOLISM: NO
HYPERTENSION: YES

## 2024-02-28 ASSESSMENT — PAIN SCALES - GENERAL
PAINLEVEL_OUTOF10: 4
PAINLEVEL_OUTOF10: 2

## 2024-02-28 ASSESSMENT — ENCOUNTER SYMPTOMS
ARTHRALGIAS: 1
OCCASIONAL FEELINGS OF UNSTEADINESS: 0
ENDOCRINE NEGATIVE: 1
CONSTITUTIONAL NEGATIVE: 1
FEVER: 0
EYES NEGATIVE: 1
RESPIRATORY NEGATIVE: 1
NECK NEGATIVE: 1
CHILLS: 0
CARDIOVASCULAR NEGATIVE: 1
LOSS OF SENSATION IN FEET: 0
NUMBNESS: 1
DEPRESSION: 0
DIARRHEA: 1

## 2024-02-28 ASSESSMENT — PAIN - FUNCTIONAL ASSESSMENT
PAIN_FUNCTIONAL_ASSESSMENT: 0-10
PAIN_FUNCTIONAL_ASSESSMENT: 0-10

## 2024-02-28 ASSESSMENT — LIFESTYLE VARIABLES: SMOKING_STATUS: NONSMOKER

## 2024-02-28 NOTE — CPM/PAT H&P
CPM/PAT Evaluation       Name: Jason Hollins (Jason Hollins)  /Age: 1960/63 y.o.     Visit Type:   In-Person       Chief Complaint: Patient is a 63 year old male, accompanied by significant other,  scheduled for Neurolysis Lower Extremity - Right  with Dr. Mikal Hawkins 3-5-24    HPI  Patient is a 63 year old male scheduled for Neurolysis Lower Extremity - Right  with Dr. 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 Hawkins for preoperative evaluation of diabetes, hypertension, hyperlipidemia    Past Medical History:   Diagnosis Date    Diabetes mellitus (CMS/HCC)     Hyperlipidemia     Morbid (severe) obesity due to excess calories (CMS/HCC) 2016    Severe obesity (BMI 35.0-39.9) with comorbidity     Past Surgical History:   Procedure Laterality Date    CT GUIDED PERCUTANEOUS BIOPSY MUSCLE  2023    CT GUIDED PERCUTANEOUS BIOPSY MUSCLE 2023 [facility] CT    OTHER SURGICAL HISTORY  2016    History Of Prior Surgery     Family History   Problem Relation Name Age of Onset    Coronary artery disease Mother      Diabetes Mother      Hypertension Mother      Cancer Father      Diabetes Sister      Diabetes Brother      Diabetes Other Grandmother      No Known Allergies    Prior to Admission medications    Medication Sig Start Date End Date Taking? Authorizing Provider   aspirin 81 mg EC tablet Take 1 tablet (81 mg) by mouth once daily. AS DIRECTED. 16  Yes Historical Provider, MD   blood sugar diagnostic (Blood Glucose Test) strip once daily. One Drop Test In Vitro Strip; Test   Yes Historical Provider, MD   dulaglutide (Trulicity) 4.5 mg/0.5 mL pen injector Inject 4.5 mg under the skin 1 (one) time per week. 9/15/23  Yes RAQUEL Gregory   dulaglutide (TRULICITY) 4.5 mg/0.5 mL pen injector INJECT ONE PEN (4.5 MG) UNDER THE SKIN ONCE WEEKLY 24 Yes RAQUEL Gregory   ergocalciferol (Vitamin D-2) 1.25 MG (01479 UT) capsule  Take 1 capsule (50,000 Units) by mouth every 14 (fourteen) days. twice monthly on the 15th and the 30 th for vitamin d deficiency 10/11/23  Yes RAQUEL Gregory   fish oil concentrate (Omega-3) 120-180 mg capsule Take 3 capsules (3,000 mg) by mouth.   Yes Historical Provider, MD   gabapentin (Neurontin) 300 mg capsule Use the 100 mg capsule to titrate to 300 mg , then use the 300 mg capsule and take 1 to 2 capsules hs for pain 1/7/24  Yes Basilia Durbin MD   ibuprofen 800 mg tablet Take 1 tablet (800 mg) by mouth 3 times a day as needed for mild pain (1 - 3) (pain). 10/11/23 10/10/24 Yes RAQUEL Gregory   metFORMIN (Glucophage) 1,000 mg tablet TAKE 1 TABLET BY MOUTH EVERY MORNING AND 1.5 TABLETS BY MOUTH EVERY EVENING , 10/11/23  Yes RAQUEL Gregory   multivitamin tablet Take 1 tablet by mouth once daily.   Yes Historical Provider, MD   omega-3 acid ethyl esters (Lovaza) 1 gram capsule Take 2 capsules (2 g) by mouth once daily. 5/16/17  Yes Historical Provider, MD   sildenafil (Viagra) 100 mg tablet Take 1 tablet (100 mg) by mouth once daily as needed (1 hour before needed). 10/11/23  Yes RAQUEL Gregory   atorvastatin (Lipitor) 40 mg tablet Take 1 tablet (40 mg) by mouth once daily. 6/30/23 12/27/23  RAQUEL Gregory   gabapentin (Neurontin) 100 mg capsule Take one capsule and increase by 1 capsule every 4 days until taking 3 capsules  Patient not taking: Reported on 2/28/2024 1/7/24   Basilia Durbin MD   NIFEdipine ER (NIFEdipine XL) 30 mg 24 hr tablet Take 1 tablet (30 mg) by mouth once daily in the morning. Take before meals. Do not crush, chew, or split. 12/19/23 12/18/24  Chiki Carbajal MD   OneTouch Ultra Test strip Test blood sugar once daily 10/11/23   RAQUEL Gregory   tiZANidine (Zanaflex) 4 mg capsule Take 1 capsule (4 mg) by mouth 3 times a day. 10/11/23 10/10/24  PHILLIP Gregory-CNP      PAT ROS:   Constitutional:   neg     no  fever   no chills  Neuro/Psych:    Numbness and tingling in legs at times   numbness  Eyes:   neg     use of corrective lenses  Ears:   neg    Nose:   neg    Mouth:   neg    Throat:   neg    Neck:   neg    Cardio:   neg    Respiratory:   neg    Endocrine:   neg    GI:    Soft stools frequently   diarrhea  :   neg    Musculoskeletal:    Knee on right side is painful   arthralgias  Hematologic:    no history of blood transfusion   no blood clots  Skin:   States long term rash to groin, states like dry skin   rash    Physical Exam  Vitals reviewed.   Constitutional:       Appearance: Normal appearance.   HENT:      Head: Normocephalic and atraumatic.      Nose: Nose normal.      Mouth/Throat:      Mouth: Mucous membranes are moist.      Pharynx: Oropharynx is clear.   Eyes:      Extraocular Movements: Extraocular movements intact.      Pupils: Pupils are equal, round, and reactive to light.   Cardiovascular:      Rate and Rhythm: Normal rate and regular rhythm.      Heart sounds: Normal heart sounds.   Pulmonary:      Effort: Pulmonary effort is normal.      Breath sounds: Normal breath sounds.   Abdominal:      General: Abdomen is flat. Bowel sounds are normal.      Palpations: Abdomen is soft.   Musculoskeletal:         General: Normal range of motion.      Cervical back: Normal range of motion and neck supple.   Skin:     General: Skin is warm and dry.   Neurological:      Mental Status: He is alert and oriented to person, place, and time.   Psychiatric:         Mood and Affect: Mood normal.         Behavior: Behavior normal.         Thought Content: Thought content normal.         Judgment: Judgment normal.        PAT AIRWAY:   Airway:     Mallampati::  II    Neck ROM::  Full   States nothing loose or removable    Visit Vitals  /73   Pulse 83   Temp 36.3 °C (97.3 °F) (Oral)     DASI Risk Score      Flowsheet Row Most Recent Value   DASI SCORE 50.7   METS Score (Will be calculated only when all the questions  are answered) 9          Caprini DVT Assessment      Flowsheet Row Most Recent Value   DVT Score 10   Current Status Major surgery planned, lasting over 3 hours, Present cancer or chemotherapy   BMI 31-40 (Obesity)          Modified Frailty Index      Flowsheet Row Most Recent Value   Modified Frailty Index Calculator .1818          CHADS2 Stroke Risk  Current as of 5 hours ago        N/A 3 - 100%: High Risk   2 - 3%: Medium Risk   0 - 2%: Low Risk     Last Change: N/A          This score determines the patient's risk of having a stroke if the patient has atrial fibrillation.        This score is not applicable to this patient. Components are not calculated.          Revised Cardiac Risk Index      Flowsheet Row Most Recent Value   Revised Cardiac Risk Calculator 1          Apfel Simplified Score      Flowsheet Row Most Recent Value   Apfel Simplified Score Calculator 2          Risk Analysis Index Results This Encounter    No data found in the last 1 encounters.       Stop Bang Score      Flowsheet Row Most Recent Value   Do you snore loudly? 0   Do you often feel tired or fatigued after your sleep? 0   Has anyone ever observed you stop breathing in your sleep? 0   Do you have or are you being treated for high blood pressure? 0   Recent BMI (Calculated) 37.7   Is BMI greater than 35 kg/m2? 1=Yes   Age older than 50 years old? 1=Yes   Is your neck circumference greater than 17 inches (Male) or 16 inches (Female)? 0   Gender - Male 1=Yes   STOP-BANG Total Score 3          Assessment and Plan:     Neuro:   No neurologic diagnoses, however, the patient is at an increased risk for post operative delirium secondary to type and duration of surgery    The patient is at an increased risk for perioperative stroke secondary to HTN, HLD, DM, general anesthesia, and op time >2.5 hours    Patient given information on brain exercises and delirium prevention.    HEENT/Airway  No diagnosis or significant findings on chart review or  clinical presentation and evaluation.    Cardiovascular    Hypertension and hyperlipidemia  Currently taking atorvastatin, nifedipine, fish oil, and Lovaza.  BP today 112/73. Patient states he does not have hypertension  No recent LDL noted    RCRI  The patient meets 0-1 RCRI criteria and therefore has a less than 1% risk of major adverse cardiac complications.  METS  The patient's functional capacity is greater than 4 METS.  MACE  30-day risk for MACE of 1 predictor, 6.0% risk for cardiac death, nonfatal myocardial infarction, and nonfatal cardiac arrest  GUY   0.2 % risk for 51st to 90th percentile    2-28-24: EKG completed. Results pending     Pulmonary   No diagnosis or significant findings on chart review or clinical presentation and evaluation.    STOP BANG Score of 3, which places patient at intermediate risk for having CARLO.  ARISCAT 26, Intermediate, 13.3% risk of in-hospital postoperative pulmonary complications  PRODIGY 19, high of respiratory depression episode.    Patient given information on deep breathing exercises.     Renal  No diagnosis or significant findings on chart review or clinical presentation and evaluation.    Endocrine    Diabetes Evaluation  Currently treating with Trulicity and metformin.  11-28-23: A1c 7.1  A1c ordered today    Hematology  No diagnosis or significant findings on chart review or clinical presentation and evaluation.    Caprini score 10, high risk of VTE  Transfusion Evaluation  A type and screen was obtained given the likelihood for perioperative transfusion of blood or blood products.    Patient given information on DVT prevention.     GI  No diagnosis or significant findings on chart review or clinical presentation and evaluation.    Eat 10- 0  Apfel: 2 points 39% risk for post operative nausea/vomiting.     Genitourinary  No diagnosis or significant findings on chart review or clinical presentation and evaluation.    ID  No diagnosis or significant findings on chart  review or clinical presentation and evaluation.    MRSA swab ordered  Chlorhexidine mouth wash and body wash ordered    Musculoskeletal  No diagnosis or significant findings on chart review or clinical presentation and evaluation.    -Preoperative medication instructions were provided and reviewed with the patient.  Any additional testing or evaluation was explained to the patient.  NPO Instructions were discussed, and the patient's questions were answered prior to conclusion of this encounter    Labs ordered:    3-1-24: MRSA Swab: MRSA noted    Results for orders placed or performed in visit on 02/28/24   Staphylococcus aureus/MRSA colonization, Culture    Specimen: Nares/Axilla/Groin; Swab   Result Value Ref Range    Staph/MRSA Screen Culture (A)      Isolated: Methicillin Resistant Staphylococcus aureus (MRSA)   Coagulation Screen   Result Value Ref Range    Protime 11.0 9.8 - 12.8 seconds    INR 1.0 0.9 - 1.1    aPTT 30 27 - 38 seconds   Type And Screen   Result Value Ref Range    ABO TYPE O     Rh TYPE POS     ANTIBODY SCREEN NEG    Hemoglobin A1C   Result Value Ref Range    Hemoglobin A1C 7.5 (H) see below %    Estimated Average Glucose 169 Not Established mg/dL     Recent Results (from the past 504 hour(s))   Comprehensive Metabolic Panel    Collection Time: 02/13/24  4:58 PM   Result Value Ref Range    Glucose 131 (H) 74 - 99 mg/dL    Sodium 143 136 - 145 mmol/L    Potassium 4.0 3.5 - 5.3 mmol/L    Chloride 106 98 - 107 mmol/L    Bicarbonate 26 21 - 32 mmol/L    Anion Gap 15 10 - 20 mmol/L    Urea Nitrogen 13 6 - 23 mg/dL    Creatinine 0.82 0.50 - 1.30 mg/dL    eGFR >90 >60 mL/min/1.73m*2    Calcium 9.9 8.6 - 10.6 mg/dL    Albumin 4.3 3.4 - 5.0 g/dL    Alkaline Phosphatase 79 33 - 136 U/L    Total Protein 6.9 6.4 - 8.2 g/dL    AST 25 9 - 39 U/L    Bilirubin, Total 0.8 0.0 - 1.2 mg/dL    ALT 37 10 - 52 U/L   CBC and Auto Differential    Collection Time: 02/13/24  4:58 PM   Result Value Ref Range    WBC 5.5  4.4 - 11.3 x10*3/uL    nRBC 0.0 0.0 - 0.0 /100 WBCs    RBC 5.11 4.50 - 5.90 x10*6/uL    Hemoglobin 14.2 13.5 - 17.5 g/dL    Hematocrit 43.9 41.0 - 52.0 %    MCV 86 80 - 100 fL    MCH 27.8 26.0 - 34.0 pg    MCHC 32.3 32.0 - 36.0 g/dL    RDW 15.2 (H) 11.5 - 14.5 %    Platelets 219 150 - 450 x10*3/uL    Neutrophils % 70.5 40.0 - 80.0 %    Immature Granulocytes %, Automated 1.1 (H) 0.0 - 0.9 %    Lymphocytes % 15.9 13.0 - 44.0 %    Monocytes % 9.4 2.0 - 10.0 %    Eosinophils % 2.2 0.0 - 6.0 %    Basophils % 0.9 0.0 - 2.0 %    Neutrophils Absolute 3.89 1.20 - 7.70 x10*3/uL    Immature Granulocytes Absolute, Automated 0.06 0.00 - 0.70 x10*3/uL    Lymphocytes Absolute 0.88 (L) 1.20 - 4.80 x10*3/uL    Monocytes Absolute 0.52 0.10 - 1.00 x10*3/uL    Eosinophils Absolute 0.12 0.00 - 0.70 x10*3/uL    Basophils Absolute 0.05 0.00 - 0.10 x10*3/uL   Lactate Dehydrogenase    Collection Time: 02/13/24  4:58 PM   Result Value Ref Range     84 - 246 U/L   Gamma-Glutamyl Transferase    Collection Time: 02/13/24  4:58 PM   Result Value Ref Range    GGT 21 5 - 64 U/L   Urinalysis with Reflex Microscopic    Collection Time: 02/13/24  4:58 PM   Result Value Ref Range    Color, Urine Orange (N) Light-Yellow, Yellow, Dark-Yellow    Appearance, Urine Ex.Turbid (N) Clear    Specific Gravity, Urine 1.024 1.005 - 1.035    pH, Urine 5.0 5.0, 5.5, 6.0, 6.5, 7.0, 7.5, 8.0    Protein, Urine NEGATIVE NEGATIVE, 10 (TRACE), 20 (TRACE) mg/dL    Glucose, Urine Normal Normal mg/dL    Blood, Urine 0.1 (1+) (A) NEGATIVE    Ketones, Urine NEGATIVE NEGATIVE mg/dL    Bilirubin, Urine NEGATIVE NEGATIVE    Urobilinogen, Urine Normal Normal mg/dL    Nitrite, Urine NEGATIVE NEGATIVE    Leukocyte Esterase, Urine NEGATIVE NEGATIVE   Microscopic Only, Urine    Collection Time: 02/13/24  4:58 PM   Result Value Ref Range    WBC, Urine NONE 1-5, NONE /HPF    RBC, Urine NONE NONE, 1-2, 3-5 /HPF    Amorphous Crystals, Urine 4+ (A) NONE, 1+, 2+ /HPF   Coagulation  Screen    Collection Time: 02/28/24  8:41 AM   Result Value Ref Range    Protime 11.0 9.8 - 12.8 seconds    INR 1.0 0.9 - 1.1    aPTT 30 27 - 38 seconds   Type And Screen    Collection Time: 02/28/24  8:41 AM   Result Value Ref Range    ABO TYPE O     Rh TYPE POS     ANTIBODY SCREEN NEG    Hemoglobin A1C    Collection Time: 02/28/24  8:41 AM   Result Value Ref Range    Hemoglobin A1C 7.5 (H) see below %    Estimated Average Glucose 169 Not Established mg/dL   Staphylococcus aureus/MRSA colonization, Culture    Collection Time: 02/28/24  8:42 AM    Specimen: Nares/Axilla/Groin; Swab   Result Value Ref Range    Staph/MRSA Screen Culture (A)      Isolated: Methicillin Resistant Staphylococcus aureus (MRSA)

## 2024-02-28 NOTE — PROGRESS NOTES
Orthopaedic Surgery Clinic Progress Note:    CC: Right gluteal extraskeletal myxoid chondrosarcoma    S: 63 y.o. male with a history of biopsy-proven right gluteal extraskeletal myxoid chondrosarcoma being treated with neoadjuvant postoperative as well as radiation therapy.  He completed his radiation on 1/23/2024 and is here to discuss surgical consolidation at this time.  He is gotten repeat imaging of the pelvis as well as CT scan of his chest to monitor for local disease as well as systemic disease.  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  He recently saw Dr. Edwards in regards to his soft tissue reconstruction earlier this week..  Currently he states his symptoms are well-controlled. Denies any numbness or tingling.  No sciatic nerve symptoms.  Otherwise doing well.    Objective:    Exam:  Gen: alert, appropriately conversational, no apparent distress  Chest: symmetric chest rise, non-labored breathing  Heart: RRR to peripheral palpation    RLE:   -There is a large approximately 15 cm mass within the right gluteal compartment that is firm, nonmobile, and tender to the touch.  Skin shows resolving radiation dermal changes with overall appearance of acceptable skin for surgery.  No open wounds.  - Hip ROM from 0-110. Ambulates without abnormal gait  -Motor intact in DF/PF/EHL/FHL, SILT in saph/sural/SPN/DPN/tibial distributions  -Foot wwp, brisk cap refill, 2+ DP/PT pulse  -Biopsy tract is healed without any evidence of drainage    Imaging:  CT scan of the chest was obtained which demonstrates no evidence of intrapulmonary/metastatic disease.  MRI of his pelvis with and without contrast redemonstrates large gluteal mass within the right gluteus una with overall stable appearing dimensions, potentially slightly smaller compared to previous.   While in close proximity to sciatic nerve there appears to be a good plane between the tumor/gluteus una muscle belly and the nerve.    A/P:    I discussed with the patient that at this point his radiation changes appear to be resolving and I feel comfortable for with the surgery this coming week.  He we already have plastic surgery on board for soft tissue reconstruction.  He has no additional questions for us at this time.  We again discussed that as a part of surgery we will certainly dissect out the sciatic nerve and given the radiation of the area there is a chance that the developing neuropraxia just due to previous sensitization secondary to radiation but we would obviously do our best to minimize any trauma to the nerve.  Risk that were discussed with patient in regards to surgery included but were not limited to risk of infection, blood loss, DVT and pulmonary embolism, failure of surgery need for future revisions, damage to local structures, complications of all incisional wound dehiscence, seroma, change in diagnosis of final pathology, local progression/recurrence, static nerve palsy, as well as chronic pain.  He was ultimately agreeable with these risks given the potential benefits and wishes to proceed.

## 2024-02-28 NOTE — PREPROCEDURE INSTRUCTIONS
NPO Instructions:    Do not eat any food after midnight the night before your surgery/procedure.  You may have 10 ounces clear liquids until TWO hours before surgery/procedure. This includes water, black tea/coffee, (no milk or cream) apple juice and electrolyte drinks (Gatorade).  You may chew gum up to TWO hours before your surgery/procedure.    Additional Instructions:     Seven/Six Days before Surgery:  We have sent a prescription for Hibiclens soap to your preferred pharmacy.  If you have not already, Please  your prescription and start using five days before surgery.  Follow the instruction sheet provided to you at your CPM/PAT appointment.  Review your medication instructions, stop indicated medications    Five Days before Surgery:  Review your medication instructions, stop indicated medications  Begin using your Hibiclens    Three Days before Surgery:  Review your medication instructions, stop indicated medications    The Day before Surgery:  Review your medication instructions, stop indicated medications  You will be contacted regarding the time of your arrival to facility and surgery time  Do not eat any food after Midnight    Day of Surgery:  Review your medication instructions, take indicated medications  If you have diabetes, please check your fasting blood sugar upon awakening.  If fasting blood sugar is <80 mg/dl, drink 100 ml of apple juice, time limit of 2 hours before  You may have clear liquids until TWO hours before surgery/procedure.  This includes water, black tea/coffee, (no milk or cream) apple juice and electrolyte drinks (Gatorade)  You may chew gum up to TWO hours before your surgery/procedure  Wear  comfortable loose fitting clothing  Do not use moisturizers, creams, lotions or perfume  All jewelry and valuables should be left at home    Avoid herbal supplements, multivitamins and NSAIDS (non-steroidal anti-inflammatory drugs) such as Advil, Aleve, Ibuprofen, Naproxen, Excedrin,  Meloxicam or Celebrex for at least 7 days prior to surgery. May take Tylenol as needed.    Zohra Franco, MSN, NP-C, CNP  Family Nurse Practitioner  Department of Anesthesiology and Perioperative Medicine  Main phone: [phone]  Fax :[phone]  Direct: [phone]

## 2024-02-29 LAB — STAPHYLOCOCCUS SPEC CULT: ABNORMAL

## 2024-03-05 ENCOUNTER — ANESTHESIA (OUTPATIENT)
Dept: OPERATING ROOM | Facility: HOSPITAL | Age: 64
DRG: 003 | End: 2024-03-05
Payer: COMMERCIAL

## 2024-03-05 ENCOUNTER — HOSPITAL ENCOUNTER (INPATIENT)
Facility: HOSPITAL | Age: 64
LOS: 52 days | DRG: 003 | End: 2024-04-26
Admitting: STUDENT IN AN ORGANIZED HEALTH CARE EDUCATION/TRAINING PROGRAM
Payer: COMMERCIAL

## 2024-03-05 ENCOUNTER — ANESTHESIA EVENT (OUTPATIENT)
Dept: OPERATING ROOM | Facility: HOSPITAL | Age: 64
DRG: 003 | End: 2024-03-05
Payer: COMMERCIAL

## 2024-03-05 DIAGNOSIS — I26.02 ACUTE SADDLE PULMONARY EMBOLISM WITH ACUTE COR PULMONALE (MULTI): ICD-10-CM

## 2024-03-05 DIAGNOSIS — I46.9 CARDIAC ARREST (MULTI): ICD-10-CM

## 2024-03-05 DIAGNOSIS — M79.89 OTHER SPECIFIED SOFT TISSUE DISORDERS: ICD-10-CM

## 2024-03-05 DIAGNOSIS — I26.99 OTHER PULMONARY EMBOLISM WITHOUT ACUTE COR PULMONALE (MULTI): ICD-10-CM

## 2024-03-05 DIAGNOSIS — R57.9 SHOCK (MULTI): Primary | ICD-10-CM

## 2024-03-05 DIAGNOSIS — N17.9 AKI (ACUTE KIDNEY INJURY) (CMS-HCC): ICD-10-CM

## 2024-03-05 DIAGNOSIS — C49.9 SOFT TISSUE SARCOMA (MULTI): ICD-10-CM

## 2024-03-05 DIAGNOSIS — N17.9 ACUTE KIDNEY INJURY (NONTRAUMATIC) (CMS-HCC): ICD-10-CM

## 2024-03-05 DIAGNOSIS — R60.0 LOCALIZED EDEMA: ICD-10-CM

## 2024-03-05 DIAGNOSIS — R57.0 CARDIOGENIC SHOCK (MULTI): ICD-10-CM

## 2024-03-05 DIAGNOSIS — T14.8XXA HEMATOMA: ICD-10-CM

## 2024-03-05 DIAGNOSIS — E11.65 TYPE 2 DIABETES MELLITUS WITH HYPERGLYCEMIA, WITHOUT LONG-TERM CURRENT USE OF INSULIN (MULTI): ICD-10-CM

## 2024-03-05 DIAGNOSIS — J95.01: ICD-10-CM

## 2024-03-05 DIAGNOSIS — I46.9 CARDIOPULMONARY ARREST (MULTI): ICD-10-CM

## 2024-03-05 DIAGNOSIS — J96.01 ACUTE RESPIRATORY FAILURE WITH HYPOXIA (MULTI): ICD-10-CM

## 2024-03-05 DIAGNOSIS — R60.9 SWELLING: ICD-10-CM

## 2024-03-05 DIAGNOSIS — T81.31XA POSTOPERATIVE WOUND BREAKDOWN, INITIAL ENCOUNTER: ICD-10-CM

## 2024-03-05 DIAGNOSIS — R53.0 NEOPLASTIC MALIGNANT RELATED FATIGUE: ICD-10-CM

## 2024-03-05 DIAGNOSIS — C49.9 EXTRASKELETAL MYXOID CHONDROSARCOMA (MULTI): ICD-10-CM

## 2024-03-05 DIAGNOSIS — I46.8 CARDIAC ARREST DUE TO OTHER UNDERLYING CONDITION (MULTI): ICD-10-CM

## 2024-03-05 DIAGNOSIS — T81.31XS POSTOPERATIVE WOUND BREAKDOWN, SEQUELA: ICD-10-CM

## 2024-03-05 DIAGNOSIS — K31.89 GASTRIC PERFORATION, ACUTE: ICD-10-CM

## 2024-03-05 LAB
ABO GROUP (TYPE) IN BLOOD: NORMAL
ALBUMIN SERPL BCP-MCNC: 3.8 G/DL (ref 3.4–5)
ANION GAP BLDA CALCULATED.4IONS-SCNC: 11 MMO/L (ref 10–25)
ANION GAP BLDA CALCULATED.4IONS-SCNC: 13 MMO/L (ref 10–25)
ANION GAP BLDA CALCULATED.4IONS-SCNC: 8 MMO/L (ref 10–25)
ANION GAP SERPL CALC-SCNC: 16 MMOL/L (ref 10–20)
ANTIBODY SCREEN: NORMAL
BASE EXCESS BLDA CALC-SCNC: -0.8 MMOL/L (ref -2–3)
BASE EXCESS BLDA CALC-SCNC: -1.9 MMOL/L (ref -2–3)
BASE EXCESS BLDA CALC-SCNC: -2 MMOL/L (ref -2–3)
BASE EXCESS BLDA CALC-SCNC: -2.1 MMOL/L (ref -2–3)
BODY TEMPERATURE: 37 DEGREES CELSIUS
BUN SERPL-MCNC: 12 MG/DL (ref 6–23)
CA-I BLDA-SCNC: 1.18 MMOL/L (ref 1.1–1.33)
CA-I BLDA-SCNC: 1.18 MMOL/L (ref 1.1–1.33)
CA-I BLDA-SCNC: 1.21 MMOL/L (ref 1.1–1.33)
CALCIUM SERPL-MCNC: 8.7 MG/DL (ref 8.6–10.6)
CHLORIDE BLDA-SCNC: 107 MMOL/L (ref 98–107)
CHLORIDE BLDA-SCNC: 108 MMOL/L (ref 98–107)
CHLORIDE BLDA-SCNC: 109 MMOL/L (ref 98–107)
CHLORIDE SERPL-SCNC: 107 MMOL/L (ref 98–107)
CO2 SERPL-SCNC: 22 MMOL/L (ref 21–32)
CREAT SERPL-MCNC: 0.74 MG/DL (ref 0.5–1.3)
EGFRCR SERPLBLD CKD-EPI 2021: >90 ML/MIN/1.73M*2
ERYTHROCYTE [DISTWIDTH] IN BLOOD BY AUTOMATED COUNT: 13.8 % (ref 11.5–14.5)
GLUCOSE BLD MANUAL STRIP-MCNC: 191 MG/DL (ref 74–99)
GLUCOSE BLD MANUAL STRIP-MCNC: 209 MG/DL (ref 74–99)
GLUCOSE BLD MANUAL STRIP-MCNC: 218 MG/DL (ref 74–99)
GLUCOSE BLD MANUAL STRIP-MCNC: 226 MG/DL (ref 74–99)
GLUCOSE BLD MANUAL STRIP-MCNC: 255 MG/DL (ref 74–99)
GLUCOSE BLDA-MCNC: 195 MG/DL (ref 74–99)
GLUCOSE BLDA-MCNC: 197 MG/DL (ref 74–99)
GLUCOSE BLDA-MCNC: 211 MG/DL (ref 74–99)
GLUCOSE SERPL-MCNC: 250 MG/DL (ref 74–99)
HCO3 BLDA-SCNC: 23.7 MMOL/L (ref 22–26)
HCO3 BLDA-SCNC: 23.7 MMOL/L (ref 22–26)
HCO3 BLDA-SCNC: 24.3 MMOL/L (ref 22–26)
HCO3 BLDA-SCNC: 24.9 MMOL/L (ref 22–26)
HCT VFR BLD AUTO: 34.4 % (ref 41–52)
HCT VFR BLD EST: 35 % (ref 41–52)
HCT VFR BLD EST: 36 % (ref 41–52)
HCT VFR BLD EST: 37 % (ref 41–52)
HGB BLD-MCNC: 11.7 G/DL (ref 13.5–17.5)
HGB BLDA-MCNC: 11.8 G/DL (ref 13.5–17.5)
HGB BLDA-MCNC: 11.9 G/DL (ref 13.5–17.5)
HGB BLDA-MCNC: 12.3 G/DL (ref 13.5–17.5)
INHALED O2 CONCENTRATION: 60 %
INHALED O2 CONCENTRATION: 60 %
LACTATE BLDA-SCNC: 1.3 MMOL/L (ref 0.4–2)
LACTATE BLDA-SCNC: 1.3 MMOL/L (ref 0.4–2)
LACTATE BLDA-SCNC: 1.8 MMOL/L (ref 0.4–2)
MAGNESIUM SERPL-MCNC: 1.6 MG/DL (ref 1.6–2.4)
MCH RBC QN AUTO: 28.5 PG (ref 26–34)
MCHC RBC AUTO-ENTMCNC: 34 G/DL (ref 32–36)
MCV RBC AUTO: 84 FL (ref 80–100)
NRBC BLD-RTO: 0 /100 WBCS (ref 0–0)
OXYHGB MFR BLDA: 96.7 % (ref 94–98)
OXYHGB MFR BLDA: 97.1 % (ref 94–98)
OXYHGB MFR BLDA: 97.5 % (ref 94–98)
OXYHGB MFR BLDA: 97.6 % (ref 94–98)
PCO2 BLDA: 43 MM HG (ref 38–42)
PCO2 BLDA: 44 MM HG (ref 38–42)
PCO2 BLDA: 44 MM HG (ref 38–42)
PCO2 BLDA: 46 MM HG (ref 38–42)
PH BLDA: 7.33 PH (ref 7.38–7.42)
PH BLDA: 7.34 PH (ref 7.38–7.42)
PH BLDA: 7.35 PH (ref 7.38–7.42)
PH BLDA: 7.36 PH (ref 7.38–7.42)
PHOSPHATE SERPL-MCNC: 3.7 MG/DL (ref 2.5–4.9)
PLATELET # BLD AUTO: 149 X10*3/UL (ref 150–450)
PO2 BLDA: 171 MM HG (ref 85–95)
PO2 BLDA: 173 MM HG (ref 85–95)
PO2 BLDA: 182 MM HG (ref 85–95)
PO2 BLDA: 196 MM HG (ref 85–95)
POTASSIUM BLDA-SCNC: 3.8 MMOL/L (ref 3.5–5.3)
POTASSIUM BLDA-SCNC: 3.9 MMOL/L (ref 3.5–5.3)
POTASSIUM BLDA-SCNC: 4.1 MMOL/L (ref 3.5–5.3)
POTASSIUM SERPL-SCNC: 3.9 MMOL/L (ref 3.5–5.3)
RBC # BLD AUTO: 4.11 X10*6/UL (ref 4.5–5.9)
RH FACTOR (ANTIGEN D): NORMAL
SAO2 % BLDA: 100 % (ref 94–100)
SAO2 % BLDA: 99 % (ref 94–100)
SODIUM BLDA-SCNC: 137 MMOL/L (ref 136–145)
SODIUM BLDA-SCNC: 139 MMOL/L (ref 136–145)
SODIUM BLDA-SCNC: 140 MMOL/L (ref 136–145)
SODIUM SERPL-SCNC: 141 MMOL/L (ref 136–145)
WBC # BLD AUTO: 7.4 X10*3/UL (ref 4.4–11.3)

## 2024-03-05 PROCEDURE — 2500000001 HC RX 250 WO HCPCS SELF ADMINISTERED DRUGS (ALT 637 FOR MEDICARE OP)

## 2024-03-05 PROCEDURE — P9045 ALBUMIN (HUMAN), 5%, 250 ML: HCPCS | Mod: JZ | Performed by: NURSE ANESTHETIST, CERTIFIED REGISTERED

## 2024-03-05 PROCEDURE — 36415 COLL VENOUS BLD VENIPUNCTURE: CPT

## 2024-03-05 PROCEDURE — 01NF0ZZ RELEASE SCIATIC NERVE, OPEN APPROACH: ICD-10-PCS | Performed by: STUDENT IN AN ORGANIZED HEALTH CARE EDUCATION/TRAINING PROGRAM

## 2024-03-05 PROCEDURE — 37799 UNLISTED PX VASCULAR SURGERY: CPT | Performed by: NURSE PRACTITIONER

## 2024-03-05 PROCEDURE — 15738 MUSCLE-SKIN GRAFT LEG: CPT | Performed by: NURSE PRACTITIONER

## 2024-03-05 PROCEDURE — 0KXQ0Z2 TRANSFER RIGHT UPPER LEG MUSCLE WITH SKIN AND SUBCUTANEOUS TISSUE, OPEN APPROACH: ICD-10-PCS

## 2024-03-05 PROCEDURE — 36620 INSERTION CATHETER ARTERY: CPT | Performed by: NURSE ANESTHETIST, CERTIFIED REGISTERED

## 2024-03-05 PROCEDURE — 82947 ASSAY GLUCOSE BLOOD QUANT: CPT

## 2024-03-05 PROCEDURE — 64712 REVISION OF SCIATIC NERVE: CPT | Performed by: STUDENT IN AN ORGANIZED HEALTH CARE EDUCATION/TRAINING PROGRAM

## 2024-03-05 PROCEDURE — A27059: Performed by: ANESTHESIOLOGY

## 2024-03-05 PROCEDURE — A4217 STERILE WATER/SALINE, 500 ML: HCPCS | Performed by: STUDENT IN AN ORGANIZED HEALTH CARE EDUCATION/TRAINING PROGRAM

## 2024-03-05 PROCEDURE — 85027 COMPLETE CBC AUTOMATED: CPT | Performed by: NURSE PRACTITIONER

## 2024-03-05 PROCEDURE — 82805 BLOOD GASES W/O2 SATURATION: CPT | Performed by: NURSE ANESTHETIST, CERTIFIED REGISTERED

## 2024-03-05 PROCEDURE — 7100000002 HC RECOVERY ROOM TIME - EACH INCREMENTAL 1 MINUTE

## 2024-03-05 PROCEDURE — 2720000007 HC OR 272 NO HCPCS

## 2024-03-05 PROCEDURE — 1170000001 HC PRIVATE ONCOLOGY ROOM DAILY

## 2024-03-05 PROCEDURE — 2500000004 HC RX 250 GENERAL PHARMACY W/ HCPCS (ALT 636 FOR OP/ED): Performed by: ANESTHESIOLOGY

## 2024-03-05 PROCEDURE — 84132 ASSAY OF SERUM POTASSIUM: CPT

## 2024-03-05 PROCEDURE — 84132 ASSAY OF SERUM POTASSIUM: CPT | Performed by: NURSE PRACTITIONER

## 2024-03-05 PROCEDURE — 27059 RESECT HIP/PELV TUM 5 CM/>: CPT | Performed by: STUDENT IN AN ORGANIZED HEALTH CARE EDUCATION/TRAINING PROGRAM

## 2024-03-05 PROCEDURE — 7100000001 HC RECOVERY ROOM TIME - INITIAL BASE CHARGE

## 2024-03-05 PROCEDURE — 3700000002 HC GENERAL ANESTHESIA TIME - EACH INCREMENTAL 1 MINUTE

## 2024-03-05 PROCEDURE — 2500000005 HC RX 250 GENERAL PHARMACY W/O HCPCS: Performed by: NURSE ANESTHETIST, CERTIFIED REGISTERED

## 2024-03-05 PROCEDURE — 2500000002 HC RX 250 W HCPCS SELF ADMINISTERED DRUGS (ALT 637 FOR MEDICARE OP, ALT 636 FOR OP/ED): Performed by: NURSE ANESTHETIST, CERTIFIED REGISTERED

## 2024-03-05 PROCEDURE — 84132 ASSAY OF SERUM POTASSIUM: CPT | Performed by: NURSE ANESTHETIST, CERTIFIED REGISTERED

## 2024-03-05 PROCEDURE — 15860 IV NJX TST VASC FLO FLAP/GRF: CPT

## 2024-03-05 PROCEDURE — 3600000008 HC OR TIME - EACH INCREMENTAL 1 MINUTE - PROCEDURE LEVEL THREE

## 2024-03-05 PROCEDURE — 83735 ASSAY OF MAGNESIUM: CPT | Performed by: NURSE PRACTITIONER

## 2024-03-05 PROCEDURE — 88307 TISSUE EXAM BY PATHOLOGIST: CPT | Performed by: STUDENT IN AN ORGANIZED HEALTH CARE EDUCATION/TRAINING PROGRAM

## 2024-03-05 PROCEDURE — 2500000001 HC RX 250 WO HCPCS SELF ADMINISTERED DRUGS (ALT 637 FOR MEDICARE OP): Performed by: NURSE PRACTITIONER

## 2024-03-05 PROCEDURE — 3600000003 HC OR TIME - INITIAL BASE CHARGE - PROCEDURE LEVEL THREE

## 2024-03-05 PROCEDURE — 88307 TISSUE EXAM BY PATHOLOGIST: CPT | Mod: TC,SUR | Performed by: STUDENT IN AN ORGANIZED HEALTH CARE EDUCATION/TRAINING PROGRAM

## 2024-03-05 PROCEDURE — 99232 SBSQ HOSP IP/OBS MODERATE 35: CPT

## 2024-03-05 PROCEDURE — 2500000004 HC RX 250 GENERAL PHARMACY W/ HCPCS (ALT 636 FOR OP/ED): Performed by: NURSE PRACTITIONER

## 2024-03-05 PROCEDURE — 2500000002 HC RX 250 W HCPCS SELF ADMINISTERED DRUGS (ALT 637 FOR MEDICARE OP, ALT 636 FOR OP/ED)

## 2024-03-05 PROCEDURE — A27059: Performed by: NURSE ANESTHETIST, CERTIFIED REGISTERED

## 2024-03-05 PROCEDURE — 2500000004 HC RX 250 GENERAL PHARMACY W/ HCPCS (ALT 636 FOR OP/ED): Performed by: NURSE ANESTHETIST, CERTIFIED REGISTERED

## 2024-03-05 PROCEDURE — 15738 MUSCLE-SKIN GRAFT LEG: CPT

## 2024-03-05 PROCEDURE — 86901 BLOOD TYPING SEROLOGIC RH(D): CPT

## 2024-03-05 PROCEDURE — 2500000004 HC RX 250 GENERAL PHARMACY W/ HCPCS (ALT 636 FOR OP/ED): Performed by: STUDENT IN AN ORGANIZED HEALTH CARE EDUCATION/TRAINING PROGRAM

## 2024-03-05 PROCEDURE — 0KBN0ZZ EXCISION OF RIGHT HIP MUSCLE, OPEN APPROACH: ICD-10-PCS | Performed by: STUDENT IN AN ORGANIZED HEALTH CARE EDUCATION/TRAINING PROGRAM

## 2024-03-05 PROCEDURE — 3700000001 HC GENERAL ANESTHESIA TIME - INITIAL BASE CHARGE

## 2024-03-05 RX ORDER — PROPOFOL 10 MG/ML
INJECTION, EMULSION INTRAVENOUS AS NEEDED
Status: DISCONTINUED | OUTPATIENT
Start: 2024-03-05 | End: 2024-03-05

## 2024-03-05 RX ORDER — INSULIN LISPRO 100 [IU]/ML
6 INJECTION, SOLUTION INTRAVENOUS; SUBCUTANEOUS ONCE
Status: COMPLETED | OUTPATIENT
Start: 2024-03-05 | End: 2024-03-05

## 2024-03-05 RX ORDER — TRANEXAMIC ACID 10 MG/ML
INJECTION, SOLUTION INTRAVENOUS AS NEEDED
Status: DISCONTINUED | OUTPATIENT
Start: 2024-03-05 | End: 2024-03-05

## 2024-03-05 RX ORDER — MIDAZOLAM HYDROCHLORIDE 1 MG/ML
INJECTION INTRAMUSCULAR; INTRAVENOUS AS NEEDED
Status: DISCONTINUED | OUTPATIENT
Start: 2024-03-05 | End: 2024-03-05

## 2024-03-05 RX ORDER — HYDROMORPHONE HYDROCHLORIDE 1 MG/ML
0.2 INJECTION, SOLUTION INTRAMUSCULAR; INTRAVENOUS; SUBCUTANEOUS EVERY 5 MIN PRN
Status: DISCONTINUED | OUTPATIENT
Start: 2024-03-05 | End: 2024-03-05 | Stop reason: HOSPADM

## 2024-03-05 RX ORDER — OXYCODONE HYDROCHLORIDE 5 MG/1
5 TABLET ORAL EVERY 4 HOURS PRN
Status: DISCONTINUED | OUTPATIENT
Start: 2024-03-05 | End: 2024-03-20

## 2024-03-05 RX ORDER — SODIUM CHLORIDE 0.9 G/100ML
IRRIGANT IRRIGATION AS NEEDED
Status: DISCONTINUED | OUTPATIENT
Start: 2024-03-05 | End: 2024-03-05 | Stop reason: HOSPADM

## 2024-03-05 RX ORDER — DIPHENHYDRAMINE HYDROCHLORIDE 50 MG/ML
12.5 INJECTION INTRAMUSCULAR; INTRAVENOUS ONCE AS NEEDED
Status: DISCONTINUED | OUTPATIENT
Start: 2024-03-05 | End: 2024-03-05 | Stop reason: HOSPADM

## 2024-03-05 RX ORDER — TRANEXAMIC ACID 100 MG/ML
INJECTION, SOLUTION INTRAVENOUS AS NEEDED
Status: DISCONTINUED | OUTPATIENT
Start: 2024-03-05 | End: 2024-03-05

## 2024-03-05 RX ORDER — HYDROMORPHONE HYDROCHLORIDE 1 MG/ML
0.2 INJECTION, SOLUTION INTRAMUSCULAR; INTRAVENOUS; SUBCUTANEOUS EVERY 4 HOURS PRN
Status: DISCONTINUED | OUTPATIENT
Start: 2024-03-05 | End: 2024-03-10

## 2024-03-05 RX ORDER — DEXMEDETOMIDINE HYDROCHLORIDE 4 UG/ML
INJECTION, SOLUTION INTRAVENOUS CONTINUOUS PRN
Status: DISCONTINUED | OUTPATIENT
Start: 2024-03-05 | End: 2024-03-05

## 2024-03-05 RX ORDER — ONDANSETRON HYDROCHLORIDE 2 MG/ML
INJECTION, SOLUTION INTRAVENOUS AS NEEDED
Status: DISCONTINUED | OUTPATIENT
Start: 2024-03-05 | End: 2024-03-05

## 2024-03-05 RX ORDER — ALBUMIN HUMAN 50 G/1000ML
SOLUTION INTRAVENOUS AS NEEDED
Status: DISCONTINUED | OUTPATIENT
Start: 2024-03-05 | End: 2024-03-05

## 2024-03-05 RX ORDER — OXYCODONE HYDROCHLORIDE 5 MG/1
5 TABLET ORAL EVERY 6 HOURS PRN
Status: DISCONTINUED | OUTPATIENT
Start: 2024-03-05 | End: 2024-03-05

## 2024-03-05 RX ORDER — DEXTROSE 50 % IN WATER (D50W) INTRAVENOUS SYRINGE
25
Status: DISCONTINUED | OUTPATIENT
Start: 2024-03-05 | End: 2024-03-20

## 2024-03-05 RX ORDER — PHENYLEPHRINE 10 MG/250 ML(40 MCG/ML)IN 0.9 % SOD.CHLORIDE INTRAVENOUS
CONTINUOUS PRN
Status: DISCONTINUED | OUTPATIENT
Start: 2024-03-05 | End: 2024-03-05

## 2024-03-05 RX ORDER — HYDROMORPHONE HYDROCHLORIDE 1 MG/ML
0.5 INJECTION, SOLUTION INTRAMUSCULAR; INTRAVENOUS; SUBCUTANEOUS EVERY 5 MIN PRN
Status: DISCONTINUED | OUTPATIENT
Start: 2024-03-05 | End: 2024-03-05 | Stop reason: HOSPADM

## 2024-03-05 RX ORDER — CEFAZOLIN SODIUM 2 G/100ML
2 INJECTION, SOLUTION INTRAVENOUS EVERY 8 HOURS
Status: DISCONTINUED | OUTPATIENT
Start: 2024-03-05 | End: 2024-03-05

## 2024-03-05 RX ORDER — ONDANSETRON HYDROCHLORIDE 2 MG/ML
4 INJECTION, SOLUTION INTRAVENOUS ONCE AS NEEDED
Status: DISCONTINUED | OUTPATIENT
Start: 2024-03-05 | End: 2024-03-05 | Stop reason: HOSPADM

## 2024-03-05 RX ORDER — MULTIVIT-MIN/IRON FUM/FOLIC AC 7.5 MG-4
1 TABLET ORAL DAILY
Status: DISCONTINUED | OUTPATIENT
Start: 2024-03-06 | End: 2024-03-20

## 2024-03-05 RX ORDER — SODIUM CHLORIDE, SODIUM LACTATE, POTASSIUM CHLORIDE, CALCIUM CHLORIDE 600; 310; 30; 20 MG/100ML; MG/100ML; MG/100ML; MG/100ML
100 INJECTION, SOLUTION INTRAVENOUS CONTINUOUS
Status: DISCONTINUED | OUTPATIENT
Start: 2024-03-05 | End: 2024-03-05 | Stop reason: HOSPADM

## 2024-03-05 RX ORDER — SODIUM CHLORIDE, SODIUM LACTATE, POTASSIUM CHLORIDE, CALCIUM CHLORIDE 600; 310; 30; 20 MG/100ML; MG/100ML; MG/100ML; MG/100ML
INJECTION, SOLUTION INTRAVENOUS CONTINUOUS PRN
Status: DISCONTINUED | OUTPATIENT
Start: 2024-03-05 | End: 2024-03-05

## 2024-03-05 RX ORDER — METHOCARBAMOL 100 MG/ML
1000 INJECTION, SOLUTION INTRAMUSCULAR; INTRAVENOUS EVERY 8 HOURS
Status: DISCONTINUED | OUTPATIENT
Start: 2024-03-05 | End: 2024-03-13

## 2024-03-05 RX ORDER — DEXTROSE MONOHYDRATE 100 MG/ML
0.3 INJECTION, SOLUTION INTRAVENOUS ONCE AS NEEDED
Status: DISCONTINUED | OUTPATIENT
Start: 2024-03-05 | End: 2024-03-20

## 2024-03-05 RX ORDER — FENTANYL CITRATE 50 UG/ML
INJECTION, SOLUTION INTRAMUSCULAR; INTRAVENOUS AS NEEDED
Status: DISCONTINUED | OUTPATIENT
Start: 2024-03-05 | End: 2024-03-05

## 2024-03-05 RX ORDER — METFORMIN HYDROCHLORIDE 1000 MG/1
1000 TABLET ORAL
Status: DISCONTINUED | OUTPATIENT
Start: 2024-03-06 | End: 2024-03-20

## 2024-03-05 RX ORDER — BISMUTH SUBSALICYLATE 262 MG
1 TABLET,CHEWABLE ORAL DAILY
Status: DISCONTINUED | OUTPATIENT
Start: 2024-03-06 | End: 2024-03-05 | Stop reason: CLARIF

## 2024-03-05 RX ORDER — ONDANSETRON 4 MG/1
4 TABLET, FILM COATED ORAL EVERY 8 HOURS PRN
Status: DISCONTINUED | OUTPATIENT
Start: 2024-03-05 | End: 2024-03-20

## 2024-03-05 RX ORDER — OXYCODONE HYDROCHLORIDE 5 MG/1
10 TABLET ORAL EVERY 4 HOURS PRN
Status: DISCONTINUED | OUTPATIENT
Start: 2024-03-05 | End: 2024-03-20

## 2024-03-05 RX ORDER — ATORVASTATIN CALCIUM 40 MG/1
40 TABLET, FILM COATED ORAL NIGHTLY
Status: DISCONTINUED | OUTPATIENT
Start: 2024-03-06 | End: 2024-03-20

## 2024-03-05 RX ORDER — DIPHENHYDRAMINE HYDROCHLORIDE 50 MG/ML
25 INJECTION INTRAMUSCULAR; INTRAVENOUS EVERY 4 HOURS PRN
Status: DISCONTINUED | OUTPATIENT
Start: 2024-03-05 | End: 2024-03-20

## 2024-03-05 RX ORDER — ENOXAPARIN SODIUM 100 MG/ML
40 INJECTION SUBCUTANEOUS EVERY 24 HOURS
Status: DISCONTINUED | OUTPATIENT
Start: 2024-03-05 | End: 2024-03-20

## 2024-03-05 RX ORDER — INDOCYANINE GREEN AND WATER 25 MG
KIT INJECTION AS NEEDED
Status: DISCONTINUED | OUTPATIENT
Start: 2024-03-05 | End: 2024-03-05

## 2024-03-05 RX ORDER — NORETHINDRONE AND ETHINYL ESTRADIOL 0.5-0.035
KIT ORAL AS NEEDED
Status: DISCONTINUED | OUTPATIENT
Start: 2024-03-05 | End: 2024-03-05

## 2024-03-05 RX ORDER — OXYCODONE HYDROCHLORIDE 5 MG/1
5 TABLET ORAL EVERY 4 HOURS PRN
Status: DISCONTINUED | OUTPATIENT
Start: 2024-03-05 | End: 2024-03-05 | Stop reason: HOSPADM

## 2024-03-05 RX ORDER — DOCUSATE SODIUM 100 MG/1
100 CAPSULE, LIQUID FILLED ORAL 2 TIMES DAILY
Status: DISCONTINUED | OUTPATIENT
Start: 2024-03-05 | End: 2024-03-20

## 2024-03-05 RX ORDER — ONDANSETRON HYDROCHLORIDE 2 MG/ML
4 INJECTION, SOLUTION INTRAVENOUS EVERY 8 HOURS PRN
Status: DISCONTINUED | OUTPATIENT
Start: 2024-03-05 | End: 2024-03-20

## 2024-03-05 RX ORDER — ACETAMINOPHEN 325 MG/1
650 TABLET ORAL EVERY 6 HOURS SCHEDULED
Status: DISCONTINUED | OUTPATIENT
Start: 2024-03-06 | End: 2024-03-20

## 2024-03-05 RX ORDER — ACETAMINOPHEN 325 MG/1
650 TABLET ORAL EVERY 4 HOURS PRN
Status: DISCONTINUED | OUTPATIENT
Start: 2024-03-05 | End: 2024-03-05 | Stop reason: HOSPADM

## 2024-03-05 RX ORDER — CEFAZOLIN SODIUM 2 G/100ML
2 INJECTION, SOLUTION INTRAVENOUS EVERY 8 HOURS
Status: COMPLETED | OUTPATIENT
Start: 2024-03-05 | End: 2024-03-07

## 2024-03-05 RX ORDER — ROCURONIUM BROMIDE 10 MG/ML
INJECTION, SOLUTION INTRAVENOUS AS NEEDED
Status: DISCONTINUED | OUTPATIENT
Start: 2024-03-05 | End: 2024-03-05

## 2024-03-05 RX ORDER — GABAPENTIN 300 MG/1
300 CAPSULE ORAL EVERY 8 HOURS SCHEDULED
Status: DISCONTINUED | OUTPATIENT
Start: 2024-03-05 | End: 2024-03-20

## 2024-03-05 RX ORDER — CEFAZOLIN 1 G/1
INJECTION, POWDER, FOR SOLUTION INTRAVENOUS AS NEEDED
Status: DISCONTINUED | OUTPATIENT
Start: 2024-03-05 | End: 2024-03-05

## 2024-03-05 RX ORDER — LABETALOL HYDROCHLORIDE 5 MG/ML
5 INJECTION, SOLUTION INTRAVENOUS ONCE AS NEEDED
Status: DISCONTINUED | OUTPATIENT
Start: 2024-03-05 | End: 2024-03-05 | Stop reason: HOSPADM

## 2024-03-05 RX ORDER — NALOXONE HYDROCHLORIDE 0.4 MG/ML
0.2 INJECTION, SOLUTION INTRAMUSCULAR; INTRAVENOUS; SUBCUTANEOUS EVERY 5 MIN PRN
Status: DISCONTINUED | OUTPATIENT
Start: 2024-03-05 | End: 2024-03-20

## 2024-03-05 RX ORDER — MAGNESIUM HYDROXIDE 2400 MG/10ML
10 SUSPENSION ORAL DAILY PRN
Status: DISCONTINUED | OUTPATIENT
Start: 2024-03-05 | End: 2024-03-20

## 2024-03-05 RX ORDER — ALBUTEROL SULFATE 0.83 MG/ML
2.5 SOLUTION RESPIRATORY (INHALATION) ONCE AS NEEDED
Status: DISCONTINUED | OUTPATIENT
Start: 2024-03-05 | End: 2024-03-05 | Stop reason: HOSPADM

## 2024-03-05 RX ORDER — ESMOLOL HYDROCHLORIDE 10 MG/ML
INJECTION INTRAVENOUS AS NEEDED
Status: DISCONTINUED | OUTPATIENT
Start: 2024-03-05 | End: 2024-03-05

## 2024-03-05 RX ORDER — ESMOLOL HYDROCHLORIDE 10 MG/ML
INJECTION, SOLUTION INTRAVENOUS CONTINUOUS PRN
Status: DISCONTINUED | OUTPATIENT
Start: 2024-03-05 | End: 2024-03-05

## 2024-03-05 RX ORDER — LIDOCAINE HYDROCHLORIDE 20 MG/ML
INJECTION, SOLUTION INFILTRATION; PERINEURAL AS NEEDED
Status: DISCONTINUED | OUTPATIENT
Start: 2024-03-05 | End: 2024-03-05

## 2024-03-05 RX ORDER — MEPERIDINE HYDROCHLORIDE 25 MG/ML
12.5 INJECTION INTRAMUSCULAR; INTRAVENOUS; SUBCUTANEOUS EVERY 10 MIN PRN
Status: DISCONTINUED | OUTPATIENT
Start: 2024-03-05 | End: 2024-03-05 | Stop reason: HOSPADM

## 2024-03-05 RX ORDER — CEFAZOLIN SODIUM 2 G/100ML
2 INJECTION, SOLUTION INTRAVENOUS EVERY 8 HOURS
Status: DISCONTINUED | OUTPATIENT
Start: 2024-03-05 | End: 2024-03-05 | Stop reason: HOSPADM

## 2024-03-05 RX ORDER — LIDOCAINE HYDROCHLORIDE 10 MG/ML
0.1 INJECTION INFILTRATION; PERINEURAL ONCE
Status: DISCONTINUED | OUTPATIENT
Start: 2024-03-05 | End: 2024-03-05 | Stop reason: HOSPADM

## 2024-03-05 RX ORDER — PHENYLEPHRINE HCL IN 0.9% NACL 0.4MG/10ML
SYRINGE (ML) INTRAVENOUS AS NEEDED
Status: DISCONTINUED | OUTPATIENT
Start: 2024-03-05 | End: 2024-03-05

## 2024-03-05 RX ORDER — INSULIN LISPRO 100 [IU]/ML
0-10 INJECTION, SOLUTION INTRAVENOUS; SUBCUTANEOUS
Status: DISCONTINUED | OUTPATIENT
Start: 2024-03-06 | End: 2024-03-06

## 2024-03-05 RX ADMIN — CEFAZOLIN 3 G: 1 INJECTION, POWDER, FOR SOLUTION INTRAMUSCULAR; INTRAVENOUS at 13:00

## 2024-03-05 RX ADMIN — CEFAZOLIN 3 G: 1 INJECTION, POWDER, FOR SOLUTION INTRAMUSCULAR; INTRAVENOUS at 09:00

## 2024-03-05 RX ADMIN — HYDROMORPHONE HYDROCHLORIDE 0.2 MG: 1 INJECTION, SOLUTION INTRAMUSCULAR; INTRAVENOUS; SUBCUTANEOUS at 19:36

## 2024-03-05 RX ADMIN — METHOCARBAMOL 1000 MG: 100 INJECTION, SOLUTION INTRAMUSCULAR; INTRAVENOUS at 23:13

## 2024-03-05 RX ADMIN — ROCURONIUM BROMIDE 20 MG: 10 INJECTION INTRAVENOUS at 16:51

## 2024-03-05 RX ADMIN — ROCURONIUM BROMIDE 30 MG: 10 INJECTION INTRAVENOUS at 10:33

## 2024-03-05 RX ADMIN — ROCURONIUM BROMIDE 50 MG: 10 INJECTION INTRAVENOUS at 08:45

## 2024-03-05 RX ADMIN — ESMOLOL HYDROCHLORIDE 30 MG: 10 INJECTION INTRAVENOUS at 09:23

## 2024-03-05 RX ADMIN — EPHEDRINE SULFATE 5 MG: 50 INJECTION, SOLUTION INTRAVENOUS at 10:24

## 2024-03-05 RX ADMIN — INSULIN HUMAN 2 UNITS: 100 INJECTION, SOLUTION PARENTERAL at 14:05

## 2024-03-05 RX ADMIN — HYDROMORPHONE HYDROCHLORIDE 0.5 MG: 1 INJECTION, SOLUTION INTRAMUSCULAR; INTRAVENOUS; SUBCUTANEOUS at 18:59

## 2024-03-05 RX ADMIN — MIDAZOLAM HYDROCHLORIDE 2 MG: 1 INJECTION, SOLUTION INTRAMUSCULAR; INTRAVENOUS at 08:35

## 2024-03-05 RX ADMIN — CEFAZOLIN 3 G: 1 INJECTION, POWDER, FOR SOLUTION INTRAMUSCULAR; INTRAVENOUS at 16:50

## 2024-03-05 RX ADMIN — ROCURONIUM BROMIDE 10 MG: 10 INJECTION INTRAVENOUS at 14:28

## 2024-03-05 RX ADMIN — ROCURONIUM BROMIDE 20 MG: 10 INJECTION INTRAVENOUS at 09:28

## 2024-03-05 RX ADMIN — FENTANYL CITRATE 25 MCG: 50 INJECTION, SOLUTION INTRAMUSCULAR; INTRAVENOUS at 10:33

## 2024-03-05 RX ADMIN — INDOCYANINE GREEN 7.5 MG: KIT INTRAVENOUS at 17:30

## 2024-03-05 RX ADMIN — HYDROMORPHONE HYDROCHLORIDE 0.2 MG: 1 INJECTION, SOLUTION INTRAMUSCULAR; INTRAVENOUS; SUBCUTANEOUS at 19:21

## 2024-03-05 RX ADMIN — Medication 120 MCG: at 08:59

## 2024-03-05 RX ADMIN — SODIUM CHLORIDE, SODIUM LACTATE, POTASSIUM CHLORIDE, CALCIUM CHLORIDE: 600; 310; 30; 20 INJECTION, SOLUTION INTRAVENOUS at 08:45

## 2024-03-05 RX ADMIN — INSULIN HUMAN 2 UNITS: 100 INJECTION, SOLUTION PARENTERAL at 09:15

## 2024-03-05 RX ADMIN — EPHEDRINE SULFATE 5 MG: 50 INJECTION, SOLUTION INTRAVENOUS at 17:56

## 2024-03-05 RX ADMIN — SODIUM CHLORIDE, POTASSIUM CHLORIDE, SODIUM LACTATE AND CALCIUM CHLORIDE: 600; 310; 30; 20 INJECTION, SOLUTION INTRAVENOUS at 08:50

## 2024-03-05 RX ADMIN — Medication 120 MCG: at 09:48

## 2024-03-05 RX ADMIN — FENTANYL CITRATE 50 MCG: 50 INJECTION, SOLUTION INTRAMUSCULAR; INTRAVENOUS at 09:26

## 2024-03-05 RX ADMIN — LIDOCAINE HYDROCHLORIDE 100 MG: 20 INJECTION, SOLUTION INFILTRATION; PERINEURAL at 08:43

## 2024-03-05 RX ADMIN — FENTANYL CITRATE 50 MCG: 50 INJECTION, SOLUTION INTRAMUSCULAR; INTRAVENOUS at 08:43

## 2024-03-05 RX ADMIN — Medication 120 MCG: at 09:05

## 2024-03-05 RX ADMIN — INSULIN LISPRO 4 UNITS: 100 INJECTION, SOLUTION INTRAVENOUS; SUBCUTANEOUS at 19:22

## 2024-03-05 RX ADMIN — ONDANSETRON 4 MG: 2 INJECTION INTRAMUSCULAR; INTRAVENOUS at 10:00

## 2024-03-05 RX ADMIN — Medication 120 MCG: at 09:51

## 2024-03-05 RX ADMIN — EPHEDRINE SULFATE 5 MG: 50 INJECTION, SOLUTION INTRAVENOUS at 09:51

## 2024-03-05 RX ADMIN — GABAPENTIN 300 MG: 300 CAPSULE ORAL at 23:14

## 2024-03-05 RX ADMIN — Medication 80 MCG: at 13:34

## 2024-03-05 RX ADMIN — FENTANYL CITRATE 25 MCG: 50 INJECTION, SOLUTION INTRAMUSCULAR; INTRAVENOUS at 14:25

## 2024-03-05 RX ADMIN — Medication 120 MCG: at 10:13

## 2024-03-05 RX ADMIN — ALBUMIN HUMAN 250 ML: 0.05 INJECTION, SOLUTION INTRAVENOUS at 10:20

## 2024-03-05 RX ADMIN — ESMOLOL HYDROCHLORIDE 20 MG: 10 INJECTION INTRAVENOUS at 09:10

## 2024-03-05 RX ADMIN — ROCURONIUM BROMIDE 30 MG: 10 INJECTION INTRAVENOUS at 12:13

## 2024-03-05 RX ADMIN — ROCURONIUM BROMIDE 20 MG: 10 INJECTION INTRAVENOUS at 13:24

## 2024-03-05 RX ADMIN — ROCURONIUM BROMIDE 20 MG: 10 INJECTION INTRAVENOUS at 15:58

## 2024-03-05 RX ADMIN — INDOCYANINE GREEN 7.5 MG: KIT INTRAVENOUS at 14:47

## 2024-03-05 RX ADMIN — Medication 120 MCG: at 10:03

## 2024-03-05 RX ADMIN — EPHEDRINE SULFATE 5 MG: 50 INJECTION, SOLUTION INTRAVENOUS at 09:47

## 2024-03-05 RX ADMIN — Medication 80 MCG: at 08:55

## 2024-03-05 RX ADMIN — Medication 120 MCG: at 08:50

## 2024-03-05 RX ADMIN — ROCURONIUM BROMIDE 20 MG: 10 INJECTION INTRAVENOUS at 11:08

## 2024-03-05 RX ADMIN — ESMOLOL HYDROCHLORIDE 30 MG: 10 INJECTION INTRAVENOUS at 09:30

## 2024-03-05 RX ADMIN — DOCUSATE SODIUM 100 MG: 100 CAPSULE, LIQUID FILLED ORAL at 23:14

## 2024-03-05 RX ADMIN — Medication 80 MCG: at 14:06

## 2024-03-05 RX ADMIN — SODIUM CHLORIDE, POTASSIUM CHLORIDE, SODIUM LACTATE AND CALCIUM CHLORIDE: 600; 310; 30; 20 INJECTION, SOLUTION INTRAVENOUS at 11:37

## 2024-03-05 RX ADMIN — ALBUMIN HUMAN 250 ML: 0.05 INJECTION, SOLUTION INTRAVENOUS at 09:35

## 2024-03-05 RX ADMIN — ALBUMIN HUMAN 250 ML: 0.05 INJECTION, SOLUTION INTRAVENOUS at 14:25

## 2024-03-05 RX ADMIN — TRANEXAMIC ACID 2000 MG: 100 INJECTION, SOLUTION INTRAVENOUS at 09:00

## 2024-03-05 RX ADMIN — OXYCODONE HYDROCHLORIDE 5 MG: 5 TABLET ORAL at 23:14

## 2024-03-05 RX ADMIN — ENOXAPARIN SODIUM 40 MG: 100 INJECTION SUBCUTANEOUS at 23:12

## 2024-03-05 RX ADMIN — Medication 40 MCG: at 14:41

## 2024-03-05 RX ADMIN — FENTANYL CITRATE 25 MCG: 50 INJECTION, SOLUTION INTRAMUSCULAR; INTRAVENOUS at 15:58

## 2024-03-05 RX ADMIN — Medication 160 MCG: at 09:30

## 2024-03-05 RX ADMIN — Medication 0.1 MCG/KG/MIN: at 09:41

## 2024-03-05 RX ADMIN — SODIUM CHLORIDE, SODIUM LACTATE, POTASSIUM CHLORIDE, CALCIUM CHLORIDE: 600; 310; 30; 20 INJECTION, SOLUTION INTRAVENOUS at 12:34

## 2024-03-05 RX ADMIN — DEXMEDETOMIDINE HYDROCHLORIDE 0.2 MCG/KG/HR: 4 INJECTION, SOLUTION INTRAVENOUS at 09:45

## 2024-03-05 RX ADMIN — Medication 120 MCG: at 11:09

## 2024-03-05 RX ADMIN — EPHEDRINE SULFATE 5 MG: 50 INJECTION, SOLUTION INTRAVENOUS at 10:03

## 2024-03-05 RX ADMIN — ACETAMINOPHEN 650 MG: 325 TABLET ORAL at 23:14

## 2024-03-05 RX ADMIN — SUGAMMADEX 200 MG: 100 INJECTION, SOLUTION INTRAVENOUS at 18:11

## 2024-03-05 RX ADMIN — INSULIN LISPRO 6 UNITS: 100 INJECTION, SOLUTION INTRAVENOUS; SUBCUTANEOUS at 23:15

## 2024-03-05 RX ADMIN — PROPOFOL 200 MG: 10 INJECTION, EMULSION INTRAVENOUS at 08:43

## 2024-03-05 RX ADMIN — Medication 160 MCG: at 09:20

## 2024-03-05 RX ADMIN — FENTANYL CITRATE 25 MCG: 50 INJECTION, SOLUTION INTRAMUSCULAR; INTRAVENOUS at 12:17

## 2024-03-05 SDOH — SOCIAL STABILITY: SOCIAL INSECURITY: DO YOU FEEL UNSAFE GOING BACK TO THE PLACE WHERE YOU ARE LIVING?: NO

## 2024-03-05 SDOH — SOCIAL STABILITY: SOCIAL INSECURITY: ARE YOU OR HAVE YOU BEEN THREATENED OR ABUSED PHYSICALLY, EMOTIONALLY, OR SEXUALLY BY ANYONE?: NO

## 2024-03-05 SDOH — SOCIAL STABILITY: SOCIAL INSECURITY: DOES ANYONE TRY TO KEEP YOU FROM HAVING/CONTACTING OTHER FRIENDS OR DOING THINGS OUTSIDE YOUR HOME?: NO

## 2024-03-05 SDOH — SOCIAL STABILITY: SOCIAL INSECURITY: HAS ANYONE EVER THREATENED TO HURT YOUR FAMILY OR YOUR PETS?: NO

## 2024-03-05 SDOH — SOCIAL STABILITY: SOCIAL INSECURITY: WERE YOU ABLE TO COMPLETE ALL THE BEHAVIORAL HEALTH SCREENINGS?: YES

## 2024-03-05 SDOH — SOCIAL STABILITY: SOCIAL INSECURITY: HAVE YOU HAD THOUGHTS OF HARMING ANYONE ELSE?: NO

## 2024-03-05 SDOH — SOCIAL STABILITY: SOCIAL INSECURITY: DO YOU FEEL ANYONE HAS EXPLOITED OR TAKEN ADVANTAGE OF YOU FINANCIALLY OR OF YOUR PERSONAL PROPERTY?: NO

## 2024-03-05 SDOH — SOCIAL STABILITY: SOCIAL INSECURITY: ARE THERE ANY APPARENT SIGNS OF INJURIES/BEHAVIORS THAT COULD BE RELATED TO ABUSE/NEGLECT?: NO

## 2024-03-05 SDOH — SOCIAL STABILITY: SOCIAL INSECURITY: ABUSE: ADULT

## 2024-03-05 ASSESSMENT — ACTIVITIES OF DAILY LIVING (ADL)
ADEQUATE_TO_COMPLETE_ADL: YES
ASSISTIVE_DEVICE: EYEGLASSES
JUDGMENT_ADEQUATE_SAFELY_COMPLETE_DAILY_ACTIVITIES: YES
GROOMING: INDEPENDENT
FEEDING YOURSELF: INDEPENDENT
PATIENT'S MEMORY ADEQUATE TO SAFELY COMPLETE DAILY ACTIVITIES?: YES
BATHING: INDEPENDENT
LACK_OF_TRANSPORTATION: NO
TOILETING: INDEPENDENT
WALKS IN HOME: INDEPENDENT
DRESSING YOURSELF: INDEPENDENT
HEARING - RIGHT EAR: FUNCTIONAL
HEARING - LEFT EAR: FUNCTIONAL

## 2024-03-05 ASSESSMENT — COLUMBIA-SUICIDE SEVERITY RATING SCALE - C-SSRS
6. HAVE YOU EVER DONE ANYTHING, STARTED TO DO ANYTHING, OR PREPARED TO DO ANYTHING TO END YOUR LIFE?: NO
1. IN THE PAST MONTH, HAVE YOU WISHED YOU WERE DEAD OR WISHED YOU COULD GO TO SLEEP AND NOT WAKE UP?: NO
2. HAVE YOU ACTUALLY HAD ANY THOUGHTS OF KILLING YOURSELF?: NO

## 2024-03-05 ASSESSMENT — PATIENT HEALTH QUESTIONNAIRE - PHQ9
SUM OF ALL RESPONSES TO PHQ9 QUESTIONS 1 & 2: 0
1. LITTLE INTEREST OR PLEASURE IN DOING THINGS: NOT AT ALL
2. FEELING DOWN, DEPRESSED OR HOPELESS: NOT AT ALL

## 2024-03-05 ASSESSMENT — LIFESTYLE VARIABLES
AUDIT-C TOTAL SCORE: 0
HOW OFTEN DO YOU HAVE A DRINK CONTAINING ALCOHOL: NEVER
SKIP TO QUESTIONS 9-10: 1
AUDIT-C TOTAL SCORE: 0
HOW MANY STANDARD DRINKS CONTAINING ALCOHOL DO YOU HAVE ON A TYPICAL DAY: PATIENT DOES NOT DRINK
HOW OFTEN DO YOU HAVE 6 OR MORE DRINKS ON ONE OCCASION: NEVER

## 2024-03-05 ASSESSMENT — PAIN - FUNCTIONAL ASSESSMENT
PAIN_FUNCTIONAL_ASSESSMENT: 0-10

## 2024-03-05 ASSESSMENT — PAIN SCALES - GENERAL
PAINLEVEL_OUTOF10: 0 - NO PAIN
PAINLEVEL_OUTOF10: 5 - MODERATE PAIN
PAINLEVEL_OUTOF10: 8
PAINLEVEL_OUTOF10: 5 - MODERATE PAIN
PAINLEVEL_OUTOF10: 8
PAINLEVEL_OUTOF10: 5 - MODERATE PAIN
PAINLEVEL_OUTOF10: 4
PAINLEVEL_OUTOF10: 5 - MODERATE PAIN

## 2024-03-05 ASSESSMENT — COGNITIVE AND FUNCTIONAL STATUS - GENERAL
PERSONAL GROOMING: A LITTLE
HELP NEEDED FOR BATHING: A LOT
DAILY ACTIVITIY SCORE: 13
EATING MEALS: A LITTLE
WALKING IN HOSPITAL ROOM: A LOT
DRESSING REGULAR UPPER BODY CLOTHING: A LOT
STANDING UP FROM CHAIR USING ARMS: TOTAL
MOVING TO AND FROM BED TO CHAIR: TOTAL
DRESSING REGULAR LOWER BODY CLOTHING: A LOT
CLIMB 3 TO 5 STEPS WITH RAILING: A LOT
MOBILITY SCORE: 10
TOILETING: TOTAL
MOVING FROM LYING ON BACK TO SITTING ON SIDE OF FLAT BED WITH BEDRAILS: A LOT
PATIENT BASELINE BEDBOUND: NO
TURNING FROM BACK TO SIDE WHILE IN FLAT BAD: A LOT

## 2024-03-05 ASSESSMENT — PAIN DESCRIPTION - LOCATION: LOCATION: BUTTOCKS

## 2024-03-05 ASSESSMENT — PAIN DESCRIPTION - ORIENTATION: ORIENTATION: RIGHT

## 2024-03-05 NOTE — SIGNIFICANT EVENT
Orthopaedic Oncology Post-Op Update  62 yo M with R gluteus una extraskeletal myxoid chondrosarcoma s/p wide resection and sciatic nerve neurolysis by Dr. Hawkins on 3/5/24. Patient remains in OR with Plastic Surgery for reconstruction.    From Orthopaedic standpoint, patient may be WBAT on the RLE. He should receive Ancef 2g q8h x24 hrs and DVT chemoprophylaxis at discretion of Plastic Surgery team. Recommend at least ASA 81 mg BID x4 weeks for wound check and to review final pathology. Follow up with Dr. Hawkins in clinic in 2-4 weeks. Orthopaedic Surgery will continue to follow.    Nirmal Burnham MD  Orthopaedic Surgery PGY-4  Available by Epic Chat    From 6pm-7am, weekends, holidays, and if no answer and there is an emergent issue, please page orthopaedic consult pager, [phone]

## 2024-03-05 NOTE — ANESTHESIA PROCEDURE NOTES
Arterial Line:    Date/Time: 3/5/2024 9:10 AM    Staffing  Performed: CRNA   Authorized by: Juanita Boateng MD    Performed by: ZOHREH Walters    An arterial line was placed. Procedure performed using surface landmarks.in the OR for the following indication(s): continuous blood pressure monitoring and blood sampling needed.    A 20 gauge (size), 1 and 3/4 inch (length), Angiocath (type) catheter was placed into the Left radial artery, secured by Tegaderm,   Seldinger technique not used.  Events:  patient tolerated procedure well with no complications.

## 2024-03-05 NOTE — ANESTHESIA PROCEDURE NOTES
Peripheral IV  Date/Time: 3/5/2024 9:15 AM  Inserted by: Juanita Boateng MD    Placement  Needle size: 18 G  Laterality: left  Location: antecubital  Local anesthetic: none  Site prep: alcohol  Technique: anatomical landmarks  Attempts: 1

## 2024-03-05 NOTE — BRIEF OP NOTE
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]     Procedures  Right gluteal reconstruction, Pedicle ALT, Vastus Lateralus Flap, Pedicle gluteal and perisformis flap, Pelvic closure, Incisional wound vac  51615 - [place] MUSC MYOCUTANEOUS/FASCIOCUTANEOUS FLAP LXTR    Wide Resection Sarcoma, Sciatic Nerve Neurolysis Lower Extremity  42766 - [place] NEURP MAJOR PRPH NRV OPN ARM/LEG SCIATIC NRV      Surgeons   Panel 1:     * Angy Smith - Primary     * Michelle Rivas  Panel 2:     * Mikal Hawkins - Primary    Resident/Fellow/Other Assistant:  Surgeon(s) and Role:  Panel 1:     * PHILLIP May-CNP  Panel 2:     * Nirmal Burnham MD - Resident - Assisting    Procedure Summary  Anesthesia: General  ASA: III  Anesthesia Staff: Anesthesiologist: Zohra Bass MD; Juanita Boateng MD  CRNA: PHILLIP Cat-CRNA; PHILLIP Walters-CRNA; ZOHREH Wade  C-AA: GEETHA Collins  Estimated Blood Loss: 150 mL  Intra-op Medications:   Administrations occurring from 0920 to 1305 on 24:   Medication Name Total Dose   sodium chloride 0.9 % irrigation solution 4,000 mL              Anesthesia Record               Intraprocedure I/O Totals          Intake    Dexmedetomidine 0.00 mL    The total shown is the total volume documented since Anesthesia Start was filed.    Tranexamic Acid 0.00 mL    The total shown is the total volume documented since Anesthesia Start was filed.    Phenylephrine Drip 0.00 mL    The total shown is the total volume documented since Anesthesia Start was filed.    LR infusion 3700.00 mL    Total Intake 3700 mL       Output    Urine 2050 mL    Est. Blood Loss 150 mL    Total Output 2200 mL       Net    Net Volume 1500 mL          Specimen:   ID Type Source Tests Collected by  Time   1 : Gluteal Sarcoma Tissue SOFT TISSUE RESECTION SURGICAL PATHOLOGY EXAM Mikal Hawkins MD 3/5/2024 1115        Staff:   Circulator: Katherine Car RN; Marii Sharma RN  Relief Scrub: Lisa Jaramillo RN  Scrub Person: John Cortez RN          Findings: soft tissue sarcoma    Complications:  None; patient tolerated the procedure well.     Disposition: PACU - hemodynamically stable.  Condition: stable  Specimens Collected:   ID Type Source Tests Collected by Time   1 : Gluteal Sarcoma Tissue SOFT TISSUE RESECTION SURGICAL PATHOLOGY EXAM Mikal Hawkins MD 3/5/2024 1115     Attending Attestation: A qualified resident physician was not available.    Alaynaal Sudheer Payne  Phone Number: [phone]

## 2024-03-05 NOTE — OP NOTE
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]     Procedures  Creation Myocutaneous Flap Lower Extremity  46748 - [place] MUSC MYOCUTANEOUS/FASCIOCUTANEOUS FLAP LXTR    Creation Myocutaneous Flap Torso  91496 - [place] MUSC MYOCUTANEOUS/FASCIOCUTANEOUS FLAP TRUNK    Creation Free Flap Torso  96298 - [place] FREE SKIN FLAP W/MICROVASCULAR ANASTOMOSIS    Neurolysis Lower Extremity  04382 - [place] NEURP MAJOR PRPH NRV OPN ARM/LEG SCIATIC NRV      Surgeons   Panel 1:     * Angy Smith - Primary  Panel 2:     * Mikal Hawkins - Primary    Resident/Fellow/Other Assistant:  Surgeon(s) and Role:  Panel 2:     * Nirmal Burnham MD - Resident - Assisting    Procedure Summary  Anesthesia: General  ASA: III  Anesthesia Staff: Anesthesiologist: Juanita Boateng MD  CRNA: PHILLIP Walters-CRNA; ZOHREH Wade  Estimated Blood Loss: 100mL  Intra-op Medications:   Administrations occurring from 0920 to 1305 on 24:   Medication Name Total Dose   sodium chloride 0.9 % irrigation solution 4,000 mL              Anesthesia Record               Intraprocedure I/O Totals          Intake    Dexmedetomidine 0.00 mL    The total shown is the total volume documented since Anesthesia Start was filed.    Tranexamic Acid 0.00 mL    The total shown is the total volume documented since Anesthesia Start was filed.    Phenylephrine Drip 0.00 mL    The total shown is the total volume documented since Anesthesia Start was filed.    Total Intake 0 mL       Output    Urine 700 mL    Est. Blood Loss 450 mL    Total Output 1150 mL       Net    Net Volume -1150 mL          Specimen:   ID Type Source Tests  Collected by Time   1 : Gluteal Sarcoma Tissue SOFT TISSUE RESECTION SURGICAL PATHOLOGY EXAM Mikal Hawkins MD 3/5/2024 1115        Staff:   Circulator: Katherine Car RN  Relief Scrub: Marii Sharma RN  Scrub Person: John Cortez RN         Drains and/or Catheters:   Urethral Catheter Non-latex;Straight-tip 16 Fr. (Active)       Tourniquet Times:         Implants:     Findings: As above    Indications: Jason Hollins is an 63 y.o. 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     The patient was seen in the preoperative area. The risks, benefits, complications, treatment options, non-operative alternatives, expected recovery and outcomes were discussed with the patient. The possibilities of reaction to medication, pulmonary aspiration, injury to surrounding structures, bleeding, recurrent infection, the need for additional procedures, failure to diagnose a condition, and creating a complication requiring transfusion or operation were discussed with the patient. The patient concurred with the proposed plan, giving informed consent.  The site of surgery was properly noted/marked if necessary per policy. The patient has been actively warmed in preoperative area. Preoperative antibiotics have been ordered and given within 1 hours of incision. 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 Grace MD    Anesthesia: General     Estimated blood loss: 100 cc     Intraoperative findings: As above, final tumor dimensions approximately 16 cm x 12 cm      Components used: None     Indications:     We reviewed the informed consent process and form in the hospital and both signed.  The surgical site was signed and I performed a timeout in the operating room. The patient received prophylactic antibiotics as well as TXA prior to skin incision.     Details of procedure:  Patient was taken to the operating room and placed on the operating table in supine position.  At this point general anesthesia was administered.  After induction the anesthesia team to control the patient cervical spine as well as airway.  Patient was placed in the lateral decubitus position with the right side upward.  All bony prominences were properly identified well-padded.  The right lower extremity as well as flank was then prepped and draped in sterile orthopedic fashion so that the plastic surgery team can perform either an ALT or potentially latissimus flap pending the defect. Once drapes were in place a timeout procedure was performed confirming appropriate patient identity, surgical site as well as procedure to be performed.  Once all in the room were in agreement we would proceed with the case.    We were able to palpate the borders of the gluteal mass which was very mobile within the gluteus una muscle belly.  We dropped the borders and were able to draw a longitudinal incision centered directly over top of this mass.  Skin was incised with 10 blade followed by electrocautery obtain hemostasis.  We dissected down to the level of fascia were very careful not to violate the fascia as we dissected downward.  Once we came of the fascia we then elevated subcutaneous flaps superficial to the fascia until that I felt we were able to come circumferentially around the mass in all directions.  Once we have done this I was able to palpate the borders of the mass in the anterior the gluteal fascia circumferentially.  We started the dissection by coming down in the interval between the posterior  lateral border of the femur and the mass itself.  Utilizing a combination of electrocautery as well as a right angle clamp we are able to gradually dissect down through the gluteus una muscle belly until we enter the potential space deep to the the fascia of the gluteus una muscle belly.  As we did this we are then able to actually gradually free the mass up on the posterior lateral border of the femur.  We able to release the gluteus una insertion from the posterior lateral border of the femur and given the position that the patient was in the section able to tumor to gradually fall away from the femur freeing up more space for us to work.  I was unable to split the gluteus una muscle belly lateral to her mass but superficial to the abductor mechanism as we continue to work down into this interval.  This enabled us to then go down and find the sciatic nerve.  Once we have the visualize a static nerve we then performed a neurolysis going from the exit of the pelvis all the way down past the gluteus una insertion so that we could then free up our tumor from this nerve as it sort of the floor of our resection.  Was performed a thorough neurolysis and we were able to dissect the mass off of the nerve along its entire course we then were able to start by coming distal to the mass transecting across the gluteus una muscle belly fibers keeping a finger on the mass to make sure we had a good cuff of tissue on the mass for margin.  Once we have done this distally we then were able to repeat this process proximally identifying perforating and crossing vessels both that were potentially feeding the tumor but also those that were part of the gluteal neurovascular system.  Vessels were appropriately ligated or cauterized with either clips or ties or electrocautery depending on the caliber.  As we continued to do this this gradually enabled us to allow the tumor to fall further further medial and away  from the lateral structures.  Eventually then I was able to then come on the underside of the tumor making sure made a good cuff of tissue on the deep underside surface to free it off of the underlying structures.  As we continue to use the only thing remaining was some remnants of the medial border of the gluteus una.  Once we verify that there was a good cuff of tissue on this border we then were able to transect these gluteus una muscle fibers as well which enabled us to then completely free our tumor in its entirety and remove it as one specimen.  Tag sutures were placed with a short stitch proximally, long stitch distally and a looped stitch superficially.  Once this was performed we are able to then send the specimen for final pathology and I was able to accompany the specimen to orient it in person.  I felt that I had a good cuff of tissue with good solid movable muscle around the entirety of the mass.  For that reason I felt that no intraoperative frozen section was necessary and felt that moving forward with soft tissue reconstruction in acute setting was reasonable especially given the large soft tissue defect and exposed sciatic nerve from the resection.  At this point we made sure that there was excellent hemostasis and there was no evidence of bleeding.  The wound was thoroughly irrigated with Irrisept solution followed by copious amounts of sterile saline.  At this point we then turned the case over to the plastic surgery team.  At the conclusion of our portion of the case the patient was tolerating procedure very well and stable.  He had suffered no major complications and was doing very well.  Please refer to Dr. Smith's operative report for his portion of the procedure.     Post Operative Plan: Activity and weightbearing restrictions will be deferred to the plastic surgery team.  From a DVT prophylaxis standpoint I would be okay with aspirin 81 mg twice daily however we will discuss with  them given the flap what they would prefer.  Patient be given appropriate pain medication in the hospital as well as at discharge.  Follow-up should be with me in 2 to 4 weeks for wound check as well as to go over final pathology.  No radiographs are needed.     Note dictated with [employer]  transcription software. Completed without full typed error editing and sent to avoid delay.    Attending Attestation: I was present and scrubbed for the entire procedure.    Mikal Hawkins MD

## 2024-03-05 NOTE — ANESTHESIA POSTPROCEDURE EVALUATION
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    Anesthesia Post Evaluation    Patient location during evaluation: PACU  Patient participation: complete - patient participated  Level of consciousness: awake and alert  Pain management: adequate  Airway patency: patent  Cardiovascular status: hemodynamically stable  Respiratory status: acceptable  Hydration status: acceptable  Postoperative Nausea and Vomiting: none      There were no known notable events for this encounter.

## 2024-03-05 NOTE — ANESTHESIA PREPROCEDURE EVALUATION
Patient: Jason Hollins    Procedure Information       Date/Time: 03/05/24 0920    Procedures:       Creation Myocutaneous Flap Lower Extremity (Right)      Creation Myocutaneous Flap Torso (Right)      Creation Free Flap Torso (Right)      Neurolysis Lower Extremity (Left: Back)    Location: [facility] OR 09 / [facility] OR    Surgeons: Angy Smith MD; Mikal Hawkins MD            Relevant Problems   Cardiovascular   (+) Hyperlipidemia      Endocrine   (+) Class 2 obesity with body mass index (BMI) of 38.0 to 38.9 in adult   (+) DM type 2 without retinopathy (CMS/HCC)   (+) Diabetes mellitus type 2 in obese (CMS/HCC)   (+) Severe obesity (BMI 35.0-39.9) with comorbidity (CMS/HCC)   (+) Type 2 diabetes mellitus with hyperglycemia (CMS/HCC)      Infectious Disease   (+) Candidal intertrigo   (+) Viral illness       Clinical information reviewed:                   NPO Detail:  No data recorded     Physical Exam    Airway  Mallampati: II  TM distance: >3 FB  Neck ROM: full     Cardiovascular    Dental - normal exam     Pulmonary    Abdominal            Anesthesia Plan    History of general anesthesia?: yes  History of complications of general anesthesia?: no    ASA 3     general     intravenous induction   Postoperative administration of opioids is intended.  Trial extubation is planned.  Anesthetic plan and risks discussed with patient.

## 2024-03-05 NOTE — ANESTHESIA PROCEDURE NOTES
Airway  Date/Time: 3/5/2024 8:47 AM  Urgency: elective    Airway not difficult    Staffing  Performed: CRNA   Authorized by: Juanita Boateng MD    Performed by: PHILLIP Walters-CRNA  Patient location during procedure: OR    Indications and Patient Condition  Indications for airway management: anesthesia  Spontaneous ventilation: present  Sedation level: deep  Preoxygenated: yes  Mask difficulty assessment: 1 - vent by mask  Planned trial extubation    Final Airway Details  Final airway type: endotracheal airway      Successful airway: ETT  Cuffed: yes   Successful intubation technique: video laryngoscopy  Endotracheal tube insertion site: oral  Blade: Paulette  Blade size: #3  ETT size (mm): 7.5  Cormack-Lehane Classification: grade I - full view of glottis  Placement verified by: chest auscultation and capnometry   Measured from: lips  ETT to lips (cm): 23  Number of attempts at approach: 1

## 2024-03-05 NOTE — Clinical Note
IVC filter placed with R groin access. VSS throughout procedure. Dressing is clean, dry, and intact. Pt to TSICU; report to TSICU RN.

## 2024-03-06 ENCOUNTER — APPOINTMENT (OUTPATIENT)
Dept: RADIOLOGY | Facility: HOSPITAL | Age: 64
DRG: 003 | End: 2024-03-06
Payer: COMMERCIAL

## 2024-03-06 LAB
ALBUMIN SERPL BCP-MCNC: 3.5 G/DL (ref 3.4–5)
ANION GAP SERPL CALC-SCNC: 15 MMOL/L (ref 10–20)
BUN SERPL-MCNC: 11 MG/DL (ref 6–23)
CALCIUM SERPL-MCNC: 8.8 MG/DL (ref 8.6–10.6)
CHLORIDE SERPL-SCNC: 107 MMOL/L (ref 98–107)
CO2 SERPL-SCNC: 23 MMOL/L (ref 21–32)
CREAT SERPL-MCNC: 0.77 MG/DL (ref 0.5–1.3)
EGFRCR SERPLBLD CKD-EPI 2021: >90 ML/MIN/1.73M*2
ERYTHROCYTE [DISTWIDTH] IN BLOOD BY AUTOMATED COUNT: 13.8 % (ref 11.5–14.5)
GLUCOSE BLD MANUAL STRIP-MCNC: 186 MG/DL (ref 74–99)
GLUCOSE BLD MANUAL STRIP-MCNC: 195 MG/DL (ref 74–99)
GLUCOSE BLD MANUAL STRIP-MCNC: 214 MG/DL (ref 74–99)
GLUCOSE BLD MANUAL STRIP-MCNC: 353 MG/DL (ref 74–99)
GLUCOSE SERPL-MCNC: 216 MG/DL (ref 74–99)
HCT VFR BLD AUTO: 32.9 % (ref 41–52)
HGB BLD-MCNC: 11 G/DL (ref 13.5–17.5)
MAGNESIUM SERPL-MCNC: 1.62 MG/DL (ref 1.6–2.4)
MCH RBC QN AUTO: 27.8 PG (ref 26–34)
MCHC RBC AUTO-ENTMCNC: 33.4 G/DL (ref 32–36)
MCV RBC AUTO: 83 FL (ref 80–100)
NRBC BLD-RTO: 0 /100 WBCS (ref 0–0)
PHOSPHATE SERPL-MCNC: 3.3 MG/DL (ref 2.5–4.9)
PLATELET # BLD AUTO: 140 X10*3/UL (ref 150–450)
POTASSIUM SERPL-SCNC: 3.8 MMOL/L (ref 3.5–5.3)
RBC # BLD AUTO: 3.96 X10*6/UL (ref 4.5–5.9)
SODIUM SERPL-SCNC: 141 MMOL/L (ref 136–145)
WBC # BLD AUTO: 7.6 X10*3/UL (ref 4.4–11.3)

## 2024-03-06 PROCEDURE — 80069 RENAL FUNCTION PANEL: CPT

## 2024-03-06 PROCEDURE — 2500000002 HC RX 250 W HCPCS SELF ADMINISTERED DRUGS (ALT 637 FOR MEDICARE OP, ALT 636 FOR OP/ED): Performed by: NURSE PRACTITIONER

## 2024-03-06 PROCEDURE — 36415 COLL VENOUS BLD VENIPUNCTURE: CPT

## 2024-03-06 PROCEDURE — 2500000004 HC RX 250 GENERAL PHARMACY W/ HCPCS (ALT 636 FOR OP/ED): Performed by: NURSE PRACTITIONER

## 2024-03-06 PROCEDURE — 83735 ASSAY OF MAGNESIUM: CPT

## 2024-03-06 PROCEDURE — 2500000004 HC RX 250 GENERAL PHARMACY W/ HCPCS (ALT 636 FOR OP/ED)

## 2024-03-06 PROCEDURE — 2500000001 HC RX 250 WO HCPCS SELF ADMINISTERED DRUGS (ALT 637 FOR MEDICARE OP)

## 2024-03-06 PROCEDURE — 85027 COMPLETE CBC AUTOMATED: CPT

## 2024-03-06 PROCEDURE — 99024 POSTOP FOLLOW-UP VISIT: CPT

## 2024-03-06 PROCEDURE — 71045 X-RAY EXAM CHEST 1 VIEW: CPT

## 2024-03-06 PROCEDURE — 71045 X-RAY EXAM CHEST 1 VIEW: CPT | Performed by: RADIOLOGY

## 2024-03-06 PROCEDURE — 99232 SBSQ HOSP IP/OBS MODERATE 35: CPT | Performed by: NURSE PRACTITIONER

## 2024-03-06 PROCEDURE — 82947 ASSAY GLUCOSE BLOOD QUANT: CPT

## 2024-03-06 PROCEDURE — 2500000002 HC RX 250 W HCPCS SELF ADMINISTERED DRUGS (ALT 637 FOR MEDICARE OP, ALT 636 FOR OP/ED)

## 2024-03-06 PROCEDURE — 1170000001 HC PRIVATE ONCOLOGY ROOM DAILY

## 2024-03-06 PROCEDURE — 2500000001 HC RX 250 WO HCPCS SELF ADMINISTERED DRUGS (ALT 637 FOR MEDICARE OP): Performed by: NURSE PRACTITIONER

## 2024-03-06 RX ORDER — INSULIN LISPRO 100 [IU]/ML
0-10 INJECTION, SOLUTION INTRAVENOUS; SUBCUTANEOUS
Status: DISCONTINUED | OUTPATIENT
Start: 2024-03-06 | End: 2024-03-10

## 2024-03-06 RX ORDER — MAGNESIUM SULFATE HEPTAHYDRATE 40 MG/ML
2 INJECTION, SOLUTION INTRAVENOUS ONCE
Status: COMPLETED | OUTPATIENT
Start: 2024-03-06 | End: 2024-03-06

## 2024-03-06 RX ORDER — SODIUM CHLORIDE, SODIUM LACTATE, POTASSIUM CHLORIDE, CALCIUM CHLORIDE 600; 310; 30; 20 MG/100ML; MG/100ML; MG/100ML; MG/100ML
75 INJECTION, SOLUTION INTRAVENOUS CONTINUOUS
Status: DISCONTINUED | OUTPATIENT
Start: 2024-03-06 | End: 2024-03-10

## 2024-03-06 RX ORDER — KETOROLAC TROMETHAMINE 30 MG/ML
30 INJECTION, SOLUTION INTRAMUSCULAR; INTRAVENOUS EVERY 6 HOURS SCHEDULED
Status: COMPLETED | OUTPATIENT
Start: 2024-03-07 | End: 2024-03-09

## 2024-03-06 RX ORDER — ASPIRIN 81 MG/1
81 TABLET ORAL DAILY
Status: DISCONTINUED | OUTPATIENT
Start: 2024-03-06 | End: 2024-03-20

## 2024-03-06 RX ADMIN — METHOCARBAMOL 1000 MG: 100 INJECTION, SOLUTION INTRAMUSCULAR; INTRAVENOUS at 21:38

## 2024-03-06 RX ADMIN — METHOCARBAMOL 1000 MG: 100 INJECTION, SOLUTION INTRAMUSCULAR; INTRAVENOUS at 13:50

## 2024-03-06 RX ADMIN — OXYCODONE HYDROCHLORIDE 10 MG: 5 TABLET ORAL at 21:38

## 2024-03-06 RX ADMIN — GABAPENTIN 300 MG: 300 CAPSULE ORAL at 13:50

## 2024-03-06 RX ADMIN — SODIUM CHLORIDE, POTASSIUM CHLORIDE, SODIUM LACTATE AND CALCIUM CHLORIDE 500 ML: 600; 310; 30; 20 INJECTION, SOLUTION INTRAVENOUS at 21:50

## 2024-03-06 RX ADMIN — GABAPENTIN 300 MG: 300 CAPSULE ORAL at 21:39

## 2024-03-06 RX ADMIN — ACETAMINOPHEN 650 MG: 325 TABLET ORAL at 12:02

## 2024-03-06 RX ADMIN — CEFAZOLIN SODIUM 2 G: 2 INJECTION, SOLUTION INTRAVENOUS at 17:22

## 2024-03-06 RX ADMIN — INSULIN LISPRO 10 UNITS: 100 INJECTION, SOLUTION INTRAVENOUS; SUBCUTANEOUS at 21:34

## 2024-03-06 RX ADMIN — INSULIN LISPRO 4 UNITS: 100 INJECTION, SOLUTION INTRAVENOUS; SUBCUTANEOUS at 08:20

## 2024-03-06 RX ADMIN — ATORVASTATIN CALCIUM 40 MG: 40 TABLET, FILM COATED ORAL at 21:39

## 2024-03-06 RX ADMIN — Medication 1 TABLET: at 08:18

## 2024-03-06 RX ADMIN — CEFAZOLIN SODIUM 2 G: 2 INJECTION, SOLUTION INTRAVENOUS at 08:18

## 2024-03-06 RX ADMIN — ASPIRIN 81 MG: 81 TABLET, COATED ORAL at 12:02

## 2024-03-06 RX ADMIN — INSULIN LISPRO 2 UNITS: 100 INJECTION, SOLUTION INTRAVENOUS; SUBCUTANEOUS at 13:54

## 2024-03-06 RX ADMIN — ENOXAPARIN SODIUM 40 MG: 100 INJECTION SUBCUTANEOUS at 21:38

## 2024-03-06 RX ADMIN — DOCUSATE SODIUM 100 MG: 100 CAPSULE, LIQUID FILLED ORAL at 08:23

## 2024-03-06 RX ADMIN — SODIUM CHLORIDE, POTASSIUM CHLORIDE, SODIUM LACTATE AND CALCIUM CHLORIDE 75 ML/HR: 600; 310; 30; 20 INJECTION, SOLUTION INTRAVENOUS at 22:41

## 2024-03-06 RX ADMIN — ACETAMINOPHEN 650 MG: 325 TABLET ORAL at 06:50

## 2024-03-06 RX ADMIN — INSULIN LISPRO 2 UNITS: 100 INJECTION, SOLUTION INTRAVENOUS; SUBCUTANEOUS at 17:22

## 2024-03-06 RX ADMIN — METFORMIN HYDROCHLORIDE 1500 MG: 1000 TABLET ORAL at 21:39

## 2024-03-06 RX ADMIN — ACETAMINOPHEN 650 MG: 325 TABLET ORAL at 17:22

## 2024-03-06 RX ADMIN — GABAPENTIN 300 MG: 300 CAPSULE ORAL at 06:50

## 2024-03-06 RX ADMIN — MAGNESIUM SULFATE HEPTAHYDRATE 2 G: 40 INJECTION, SOLUTION INTRAVENOUS at 10:09

## 2024-03-06 RX ADMIN — DOCUSATE SODIUM 100 MG: 100 CAPSULE, LIQUID FILLED ORAL at 21:39

## 2024-03-06 RX ADMIN — METHOCARBAMOL 1000 MG: 100 INJECTION, SOLUTION INTRAMUSCULAR; INTRAVENOUS at 06:50

## 2024-03-06 ASSESSMENT — COGNITIVE AND FUNCTIONAL STATUS - GENERAL
MOVING FROM LYING ON BACK TO SITTING ON SIDE OF FLAT BED WITH BEDRAILS: A LOT
DAILY ACTIVITIY SCORE: 13
CLIMB 3 TO 5 STEPS WITH RAILING: TOTAL
DRESSING REGULAR LOWER BODY CLOTHING: A LOT
STANDING UP FROM CHAIR USING ARMS: TOTAL
DRESSING REGULAR UPPER BODY CLOTHING: A LOT
WALKING IN HOSPITAL ROOM: TOTAL
TOILETING: A LOT
PERSONAL GROOMING: A LOT
HELP NEEDED FOR BATHING: A LOT
EATING MEALS: A LITTLE
MOVING TO AND FROM BED TO CHAIR: TOTAL
TURNING FROM BACK TO SIDE WHILE IN FLAT BAD: A LOT
MOBILITY SCORE: 8

## 2024-03-06 ASSESSMENT — PAIN SCALES - GENERAL
PAINLEVEL_OUTOF10: 2
PAINLEVEL_OUTOF10: 2
PAINLEVEL_OUTOF10: 8
PAINLEVEL_OUTOF10: 7
PAINLEVEL_OUTOF10: 3

## 2024-03-06 ASSESSMENT — PAIN - FUNCTIONAL ASSESSMENT
PAIN_FUNCTIONAL_ASSESSMENT: 0-10

## 2024-03-06 NOTE — PROGRESS NOTES
Pharmacy Medication History Review    Jason Hollins is a 63 y.o. male admitted for Soft tissue sarcoma (CMS/[facility]). Pharmacy reviewed the patient's [id number] medications and allergies for accuracy.    The list below reflects the updated PTA list. Comments regarding how patient may be taking medications differently can be found in the Admit Orders Activity  Prior to Admission Medications   Prescriptions Last Dose Informant Patient Reported? Taking?   NIFEdipine ER (NIFEdipine XL) 30 mg 24 hr tablet Not Taking at discontinued2/29 Self No No   Sig: Take 1 tablet (30 mg) by mouth once daily in the morning. Take before meals. Do not crush, chew, or split.   Patient not taking: Reported on 3/5/2024   OneTouch Ultra Test strip Not Taking Self No Yes   Sig: Test blood sugar once daily   Patient not taking: Reported on 3/5/2024   aspirin 81 mg EC tablet Past Week Self Yes Yes   Sig: Take 1 tablet (81 mg) by mouth once daily. AS DIRECTED.   atorvastatin (Lipitor) 40 mg tablet 3/3/2024 at pm  No Yes   Sig: Take 1 tablet (40 mg) by mouth once daily.   blood sugar diagnostic (Blood Glucose Test) strip Past Week Self Yes Yes   Sig: once daily. One Drop Test In Vitro Strip; Test   chlorhexidine (Hibiclens) 4 % external liquid   No Yes   Sig: Apply topically 2 times a day for 5 days.   chlorhexidine (Peridex) 0.12 % solution 3/5/2024 Self No Yes   Sig: Use 15 mL in the mouth or throat see administration instructions for 2 doses. Use the night before and morning of surgery.   dulaglutide (TRULICITY) 4.5 mg/0.5 mL pen injector 2/24/2024 Self No Yes   Sig: INJECT ONE PEN (4.5 MG) UNDER THE SKIN ONCE WEEKLY   dulaglutide (Trulicity) 4.5 mg/0.5 mL pen injector 2/24/2024 Self No Yes   Sig: Inject 4.5 mg under the skin 1 (one) time per week.   ergocalciferol (Vitamin D-2) 1.25 MG (39498 UT) capsule 2/29/2024 Self No Yes   Sig: Take 1 capsule (50,000 Units) by mouth every 14 (fourteen) days. twice monthly on the 15th and the 30 th for  vitamin d deficiency   fish oil concentrate (Omega-3) 120-180 mg capsule 3/3/2024 Self Yes Yes   Sig: Take 1 capsule (1 g) by mouth.   gabapentin (Neurontin) 300 mg capsule Past Month Self No Yes   Sig: Use the 100 mg capsule to titrate to 300 mg , then use the 300 mg capsule and take 1 to 2 capsules hs for pain   ibuprofen 800 mg tablet Past Month Self No Yes   Sig: Take 1 tablet (800 mg) by mouth 3 times a day as needed for mild pain (1 - 3) (pain).   metFORMIN (Glucophage) 1,000 mg tablet 3/4/2024 Self No Yes   Sig: TAKE 1 TABLET BY MOUTH EVERY MORNING AND 1.5 TABLETS BY MOUTH EVERY EVENING ,   multivitamin tablet 3/4/2024 Self Yes Yes   Sig: Take 1 tablet by mouth once daily.   sildenafil (Viagra) 100 mg tablet Not Taking Self No No   Sig: Take 1 tablet (100 mg) by mouth once daily as needed (1 hour before needed).   Patient not taking: Reported on 3/5/2024   tiZANidine (Zanaflex) 4 mg capsule Not Taking at pt requests refill Self No No   Sig: Take 1 capsule (4 mg) by mouth 3 times a day.   Patient not taking: Reported on 3/5/2024      Facility-Administered Medications: None        The list below reflects the updated allergy list. Please review each documented allergy for additional clarification and justification.  Allergies  Reviewed by Juanita Goel RN on 3/5/2024   No Known Allergies         Patient accepts M2B at discharge. Pharmacy has been updated to [facility].    Sources used to complete the med history include   Patient Interview - good historian able to confirm medications from a verbally presented list, and independently state directions for administration, and provide supplemental medication history  Admission MedRec Grid  [place] - gabapentin 300mg LF: 2/28/24, #90, 45DS  [facility] medication dispense report    Below are additional concerns with the patient's PTA list.  None    Anna Del Rosario, PharmD   Transitions of Care Pharmacist   Meds Ambulatory and Retail Services  Please reach out via  Secure Chat for questions, or if no response call [facility] or [facility]

## 2024-03-06 NOTE — PROGRESS NOTES
TCC Note 3/6/24    Plan per Medical/Surgical Team: POD#1 s/p r glutal reconstruction, vastus lateralus flap, gluteal and perisformis flap with incisional wound vac placement. Flap checks, incisional wound vac/care, bedrest x10 days, ANDERSON daniels drain   Status: Inpatietn  Payor Source: [employer]  Discharge disposition: SNF vs Home with HC  Expected date of discharge:3/13/24  Barriers: Medical  Preferred home care agency:[facility]  Will continue to follow patient for any discharge needs. Debbie Lainez RN, TCC   03/06/24 1411   Discharge Planning   Living Arrangements Spouse/significant other   Support Systems Spouse/significant other   Assistance Needed Patient is independent with adls, does not use assistive devices.   Type of Residence Private residence   Number of Stairs to Enter Residence 2   Number of Stairs Within Residence 12   Do you have animals or pets at home? No   Who is requesting discharge planning? Provider   Home or Post Acute Services Post acute facilities (Rehab/SNF/etc)   Type of Post Acute Facility Services Rehab   Patient expects to be discharged to: Home with HC vs SNF   Does the patient need discharge transport arranged? Yes   RoundTrip coordination needed? Yes   Has discharge transport been arranged? No     Met with patient and introduced myself as care cooordinator and member ot Care transitions team for discharge planning.  Patient is independent, does not use assistive devices. Wife is primary teachable caregiver. Patient denies falls, feels safe at home. [facility] AOC if needed, patient is also agreeable to SNF if needed. PCP: Mary Arenas CNP. Pharmacy: [address] Demographics and contacts verified. Will continue to assist with discharge needs. Debbie Lainez RN, TCC

## 2024-03-06 NOTE — PROGRESS NOTES
Department of Plastic and Reconstructive Surgery  Daily Progress Note    Patient Name: Jason Hollins  MRN: [id number]  Date:  03/06/24     Subjective  Resting in bed in left lateral decubitus position. Reports 8/10 pain to RLE. Notes decreased sensation and tingling throughout RLE. Notes continued leaking from his daniels. Notes he has not eaten yet because he is nervous to have a bowel movement. Encouraged to try small amounts of food and protein shakes. Denies fever, chest pain, SOB, abd pain, n/v/d.    Objective  Physical Exam   Constitutional: NAD  Eyes: EOMI, clear sclera  Head/Neck: NCAT  Cardiovascular: Tachycardic rate  Respiratory/Thorax: Unlabored respirations on RA  Gastrointestinal: Abdomen soft, ND/NT.   Musculoskeletal: NWB RLE. +SILT BLE. Intact RLE DP pulses. Compartments soft and compressible.   Neurological: A&Ox3  Psychological: Appropriate mood and behavior  Skin: Warm and dry, no rashes.  Extremity: Pedicle flap to right gluteal region appropriate in color. Pedicle flap warm to touch, soft and compressible with no discoloration. Incisional wound vac intact maintaining suction without obstruction or air leak to right gluteal/thigh region. ANDERSON x4 (x2 to right lower back, x2 to lateral/medial thigh) draining moderate bloody serous drainage. Area appropriately TTP. Maintains lateral lying on left side, with leg flexed no greater than 20 degrees.     Assessment  Jason Hollins is a 63 y.o. male with a past medical history significant for DM II, HPL, HTN, and gluteal myxoid chondrosarcoma s/p radiation. He is now S/P wide resection of gluteal sarcoma, sciatic nerve neurolysis (Per ortho) and right gluteal reconstruction, pedicle ALT, vastus lateralus flap, pedicle gluteal and perisformis flap, pelvic closure, and incisional wound vac placement (Per plastics) on 3/5/24 with Dr. Hawkins and Dr. Smith.    Plan/Recommendations  S/p Right gluteal reconstruction, pedicle ALT, vastus lateralus flap, pedicle gluteal and  perisformis flap with incisional wound vac placement.  - Serial flap assessments Q4hour per nursing with interval checks per plastics       ·  Assess flap appearance (color, warmth, turgor) over windowed area. No doppler assessment required       ·  Nursing to notify plastic surgery team immediately if there are any acute changes          (Including pallor, temperature change, bleeding, etc.)  - Maintain incisional wound vac to right gluteal/lateral thigh region 10-14 days        ·  Settings: -125mmHg low continuous suction        ·  Dressing to remain in place for 10/14 days       ·  Monitor/record vac canister output [id number]       ·  Notify plastic surgery with any concerns of leak or obstruction       ·  Plan to transition to Prevena homegoing vac on day of discharge    - Continue ANDERSON drain care per nursing (ANDERSON x4- x2 lower back, x2 lateral/medial thigh)     ·  Strip drain tubing TID and PRN     ·  Monitor and record output q8h      ·  Keep drain sites C/D/I with daily drain dressing changes      ·  Removal per plastic surgery team when output <30 cc/24 hrs for 2 consecutive days   - Maintain left lateral decubitus positioning to avoid pressure to flap region/incisions  - OK to lay flat to use bedpan   - Avoid flexion of knee >20 degrees. Maintain RLE in soft knee immobilizer   - NWB RLE per plastics   - Strict bedrest x10 days post-operatively   - x3 doses periop Ancef   - Monitor VS/pulse ox [id number]   - Monitor AM CBC/RFP/Mg PRN   - Remove daniels POD1    # Acute post op pain  - Oxy 5mg (Q4 PRN mod pain), Oxy 10mg (Q4 PRN severe pain)  - Dilaudid 0.2 mg Q4 breakthrough pain  - Robaxin 1000mg Q8 hours scheduled  - Acetaminophen 650mg Q6 hours scheduled  - Gabapentin 300mg Q8 scheduled  - Zofran 4mg PRN for opoid associated nausea  - Benadryl 25mg PRN for itching    # DM Type II  - Resume home metformin POD1 (1,000mg AM, 1,500mg PM)  - AC/HS SSI initiated to begin AM POD1  - Hypoglycemic protocol in place    #  Hyperlipidemia   - Atorvastatin 40mg daily ordered    # HTN  - BP stable, does not take any home medications    #FEN/GI  - Encouraged oral intake  - Monitor and replete lytes PRN  - Regular diet   - Bowel regimen: Colace 100mg BID and Milk of Mag PRN    #DVT Prophylaxis  - SQ Lovenox   - Resume home ASA 81mg Aspirin   - SCD    - Encouraged use of IS (x10 every hour while awake)    Patient and plan discussed with Dr. Stevens, PA-C  Plastic and Reconstructive Surgery  Epic chat, Pager [phone], Team phones: [phone]

## 2024-03-06 NOTE — HOSPITAL COURSE
BRIEF HISTORY:    Jason Hollins is a 63 year old male with PMH significant for DM type 2, HPL, HTN, and gluteal myxoid chondrosarcoma s/p radiation. He was taken to the OR on 3/5/2024 for wide resection of gluteal sarcoma, sciatic nerve neurolysis with Dr. Hawkins of Orthopedics followed by right gluteal reconstruction via pedicle ALT, vastus lateralus flap, pedicle gluteal and perisformis flap, pelvic closure, and incisional wound vac placement with Dr. Smith of Plastic Surgery.    HOSPITAL COURSE:    Jason Hollins is a 63 y.o. 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 Hawkins and Dr. Smith on 3/5/24.  Post operative course was uncomplicated and patient was on RNF until 3/20 for prolonged bed rest to avoid hip flexion to maintain flap integrity.  Patient was on prophylactic lovenox while on bedrest.      3/20: Cardiopulmonary arrest s/p CPR with ROSC after TPA, overnight started on heparin, inhaled epo, increased pressor requirements, increased swelling at flap site.      3/21: Hemorrhagic shock, MTP. Firm and large right flap site. Return to OR s/p Exploration of right thigh wound, Evacuation of hematoma. Suture ligation of multiple bleeding vessel and several points in gluteal area.      4/1: s/p Trach     4/4: return OR with ENT s/p laryngoscopy, esophagoscopy and bronchoscopy, trach revision. Found extensive clot burden in esophagus and stomach as well as in the lungs. Oozing at thyroid bed cauterized. Trach exchanged to new 6.0 prox XLT elvie. OR findings: blood up glottis and from thyroid bed to airway.     4/18: Patient bleeding into RLE given increased ANDERSON drain output and non-incrementing Hgb despite transfusion. Return to OR for washout.      4/21: patient more somnolent, increasing WBC, Temp 99.7, Lactate 2.9  and increasing pressor requirements: pan-cultured, resumed vancomycin, reengaged ID, and returned hydrocortisone dosing to q6hr, transfused PRBC     : Developed increasing pressure req, acidosis, elevated lactate, pneumatosis intestinalis with gastric perforation. Ex lap was indicated; dead tissue in colon and large part of small intestines. Fatal prognosis was discussed with family. Patient made DNR and treatment was temporarily continued for family visitation. Patient declared dead at 1747 following cessation of life sustaining measures.    CONSULTATIONS:  Endocrinology consulted for diabetic management.   ID consulted for numerous infectious queries    DAY OF DISCHARGE:    Patient  as a result of end-of-life discussion on 24. Please see significant event pertaining to death note.

## 2024-03-06 NOTE — PROGRESS NOTES
"  Department of Plastic and Reconstructive Surgery  Daily Progress Note    Patient Name: Jason Hollins  MRN: [id number]  Date:  03/05/24     Subjective  Found resting in bed comfortably with wife and sister at bedside. Reports pain 4/10 currently which is manageable. Reports some tingling in b/l soles of foot which he reports is present when blood glucose is elevated. Denies any fever, chills, night sweats, CP, SOB, palpitations, nausea, vomiting, or abdominal pain/discomfort.     Overnight Events  OR this afternoon in a combo case with ortho/plastics for wide resection of gluteal sarcoma and right gluteal reconstruction pedicle ALT, vastus lateralus flap, pedical gluteal and perisformis flap, with incisional wound vac placement.    Objective    Vital Signs  /66 (BP Location: Right arm)   Pulse 99   Temp 36.9 °C (98.4 °F) (Temporal)   Resp 20   Ht 1.778 m (5' 10\")   Wt 101 kg (222 lb 14.2 oz)   SpO2 95%   BMI 31.98 kg/m²      Physical Exam   Constitutional: A&Ox3, calm and cooperative, NAD.  Eyes: EOMI, clear sclera.  Head/Neck: Neck supple, trachea midline.  Cardiovascular: Normal rate. 2+ equal pulses of the distal extremities  Respiratory/Thorax: Unlabored respirations on RA  Gastrointestinal: Abdomen soft, ND/NT.   Musculoskeletal: NWB RLE. +SILT BLE. Intact RLE DP pulses. Compartments soft and compressible.   Neurological: A&Ox3, cranial nerves II-XII grossly intact. Sensation grossly intact.  Psychological: Appropriate mood and behavior  Skin: Warm and dry, no rashes.  Extremity: Pedicle flap to right gluteal region appropriate in color. Pedicle flap warm to touch, soft and compressible with no discoloration. Incisional wound vac intact maintaining suction without obstruction or air leak to right gluteal/thigh region. No output appreciated in collecting canister. ANDERSON x4 (x2 to right lower back, x2 to lateral/medial thigh) draining moderate bloody serous drainage. Area appropriately TTP. Maintains lateral " lying on left side, with leg flexed no greater than 20 degrees.     Diagnostics   Results for orders placed or performed during the hospital encounter of 03/05/24 (from the past 24 hour(s))   POCT GLUCOSE   Result Value Ref Range    POCT Glucose 226 (H) 74 - 99 mg/dL   Type and Screen   Result Value Ref Range    ABO TYPE O     Rh TYPE POS     ANTIBODY SCREEN NEG    Blood Gas Arterial Full Panel Unsolicited   Result Value Ref Range    POCT pH, Arterial 7.34 (L) 7.38 - 7.42 pH    POCT pCO2, Arterial 44 (H) 38 - 42 mm Hg    POCT pO2, Arterial 173 (H) 85 - 95 mm Hg    POCT SO2, Arterial 100 94 - 100 %    POCT Oxy Hemoglobin, Arterial 97.5 94.0 - 98.0 %    POCT Hematocrit Calculated, Arterial 35.0 (L) 41.0 - 52.0 %    POCT Sodium, Arterial 137 136 - 145 mmol/L    POCT Potassium, Arterial 3.9 3.5 - 5.3 mmol/L    POCT Chloride, Arterial 109 (H) 98 - 107 mmol/L    POCT Ionized Calcium, Arterial 1.18 1.10 - 1.33 mmol/L    POCT Glucose, Arterial 195 (H) 74 - 99 mg/dL    POCT Lactate, Arterial 1.8 0.4 - 2.0 mmol/L    POCT Base Excess, Arterial -2.1 (L) -2.0 - 3.0 mmol/L    POCT HCO3 Calculated, Arterial 23.7 22.0 - 26.0 mmol/L    POCT Hemoglobin, Arterial 11.8 (L) 13.5 - 17.5 g/dL    POCT Anion Gap, Arterial 8 (L) 10 - 25 mmo/L    Patient Temperature 37.0 degrees Celsius   Blood Gas Arterial Full Panel Unsolicited   Result Value Ref Range    POCT pH, Arterial 7.35 (L) 7.38 - 7.42 pH    POCT pCO2, Arterial 43 (H) 38 - 42 mm Hg    POCT pO2, Arterial 196 (H) 85 - 95 mm Hg    POCT SO2, Arterial 100 94 - 100 %    POCT Oxy Hemoglobin, Arterial 97.6 94.0 - 98.0 %    POCT Hematocrit Calculated, Arterial 37.0 (L) 41.0 - 52.0 %    POCT Sodium, Arterial 139 136 - 145 mmol/L    POCT Potassium, Arterial 4.1 3.5 - 5.3 mmol/L    POCT Chloride, Arterial 108 (H) 98 - 107 mmol/L    POCT Ionized Calcium, Arterial 1.18 1.10 - 1.33 mmol/L    POCT Glucose, Arterial 197 (H) 74 - 99 mg/dL    POCT Lactate, Arterial 1.3 0.4 - 2.0 mmol/L    POCT Base  Excess, Arterial -2.0 -2.0 - 3.0 mmol/L    POCT HCO3 Calculated, Arterial 23.7 22.0 - 26.0 mmol/L    POCT Hemoglobin, Arterial 12.3 (L) 13.5 - 17.5 g/dL    POCT Anion Gap, Arterial 11 10 - 25 mmo/L    Patient Temperature 37.0 degrees Celsius   POCT GLUCOSE   Result Value Ref Range    POCT Glucose 191 (H) 74 - 99 mg/dL   BLOOD GAS ARTERIAL   Result Value Ref Range    POCT pH, Arterial 7.36 (L) 7.38 - 7.42 pH    POCT pCO2, Arterial 44 (H) 38 - 42 mm Hg    POCT pO2, Arterial 182 (H) 85 - 95 mm Hg    POCT SO2, Arterial 100 94 - 100 %    POCT Oxy Hemoglobin, Arterial 97.1 94.0 - 98.0 %    POCT Base Excess, Arterial -0.8 -2.0 - 3.0 mmol/L    POCT HCO3 Calculated, Arterial 24.9 22.0 - 26.0 mmol/L    Patient Temperature 37.0 degrees Celsius    FiO2 60 %   Blood Gas Arterial Full Panel   Result Value Ref Range    POCT pH, Arterial 7.33 (L) 7.38 - 7.42 pH    POCT pCO2, Arterial 46 (H) 38 - 42 mm Hg    POCT pO2, Arterial 171 (H) 85 - 95 mm Hg    POCT SO2, Arterial 99 94 - 100 %    POCT Oxy Hemoglobin, Arterial 96.7 94.0 - 98.0 %    POCT Hematocrit Calculated, Arterial 36.0 (L) 41.0 - 52.0 %    POCT Sodium, Arterial 140 136 - 145 mmol/L    POCT Potassium, Arterial 3.8 3.5 - 5.3 mmol/L    POCT Chloride, Arterial 107 98 - 107 mmol/L    POCT Ionized Calcium, Arterial 1.21 1.10 - 1.33 mmol/L    POCT Glucose, Arterial 211 (H) 74 - 99 mg/dL    POCT Lactate, Arterial 1.3 0.4 - 2.0 mmol/L    POCT Base Excess, Arterial -1.9 -2.0 - 3.0 mmol/L    POCT HCO3 Calculated, Arterial 24.3 22.0 - 26.0 mmol/L    POCT Hemoglobin, Arterial 11.9 (L) 13.5 - 17.5 g/dL    POCT Anion Gap, Arterial 13 10 - 25 mmo/L    Patient Temperature 37.0 degrees Celsius    FiO2 60 %   POCT GLUCOSE   Result Value Ref Range    POCT Glucose 209 (H) 74 - 99 mg/dL   POCT GLUCOSE   Result Value Ref Range    POCT Glucose 218 (H) 74 - 99 mg/dL   CBC   Result Value Ref Range    WBC 7.4 4.4 - 11.3 x10*3/uL    nRBC 0.0 0.0 - 0.0 /100 WBCs    RBC 4.11 (L) 4.50 - 5.90 x10*6/uL     Hemoglobin 11.7 (L) 13.5 - 17.5 g/dL    Hematocrit 34.4 (L) 41.0 - 52.0 %    MCV 84 80 - 100 fL    MCH 28.5 26.0 - 34.0 pg    MCHC 34.0 32.0 - 36.0 g/dL    RDW 13.8 11.5 - 14.5 %    Platelets 149 (L) 150 - 450 x10*3/uL   Renal function panel   Result Value Ref Range    Glucose 250 (H) 74 - 99 mg/dL    Sodium 141 136 - 145 mmol/L    Potassium 3.9 3.5 - 5.3 mmol/L    Chloride 107 98 - 107 mmol/L    Bicarbonate 22 21 - 32 mmol/L    Anion Gap 16 10 - 20 mmol/L    Urea Nitrogen 12 6 - 23 mg/dL    Creatinine 0.74 0.50 - 1.30 mg/dL    eGFR >90 >60 mL/min/1.73m*2    Calcium 8.7 8.6 - 10.6 mg/dL    Phosphorus 3.7 2.5 - 4.9 mg/dL    Albumin 3.8 3.4 - 5.0 g/dL   Magnesium   Result Value Ref Range    Magnesium 1.60 1.60 - 2.40 mg/dL     CT angio aorta and bilateral iliofemoral runoff w and or wo IV contrast    Result Date: 2/28/2024  Interpreted By:  Elaine Maya, STUDY: CT ANGIO AORTA AND BILATERAL ILIOFEMORAL RUNOFF W AND OR WO IV CONTRAST;  2/28/2024 3:05 am   INDICATION: Signs/Symptoms:preop planning.   COMPARISON: MRI of the pelvis dated 01/31/2024   ACCESSION NUMBER(S): [id number]   ORDERING CLINICIAN: JARRED ADAMS   TECHNIQUE: Thin-section axial images of the abdomen, pelvis, bilateral lower extremities in the arterial phase after intravenous administration of lower osmolar agent  75 mL of Omnipaque 350 contrast.     CT Dose Reduction Employed: Yes, iterative reconstruction   For optimization of anatomic evaluation, multiplanar reconstruction, maximum intensity projections, and advanced 3-D off-line postprocessing were performed on a dedicated stand-alone workstation by the interpreting physician.   FINDINGS: VASCULATURE:   ABDOMINAL AORTA: No abdominal aortic aneurysm or dissection. No significant atherosclerosis. Mild tortuosity of the iliac vasculature is present. Celiac artery: No significant atherosclerotic calcification is noted along the celiac artery. The right hepatic artery appears to be originating from the  SMA as a normal variant. Superior mesenteric artery: The superior mesenteric artery appears to be widely patent. As mentioned above, the right hepatic artery is originating from the SMA as a normal variant. Right renal artery: 3 renal arteries are noted supplying the right kidney. Left renal artery: A single left-sided renal artery is noted.   The inferior mesenteric artery is widely patent.   ABDOMINAL AND PELVIC ARTERIES:   - Right Common Iliac Artery:  No atherosclerosis No hemodynamically significant stenosis. - Right External Iliac Artery:  No significant atherosclerosis No hemodynamically significant stenosis. - Right Internal Iliac Artery:  No significant atherosclerosis No hemodynamically significant stenosis. Branches of the right internal iliac artery appears to be in close proximity of the mass within the right gluteal region and likely providing supply via the inferior gluteal artery. The superior gluteal artery likely is not supplying the mass.   - Left Common Iliac Artery:  No atherosclerosis No hemodynamically significant stenosis. - Left External Iliac Artery:  No atherosclerosis No hemodynamically significant stenosis. - Left Internal Iliac Artery:  mild  atherosclerosis No hemodynamically significant stenosis.     RIGHT LOWER EXTREMITY:   - Common Femoral Artery:  Minimal atherosclerosis just before the bifurcation no hemodynamically significant stenosis. - Profunda Artery:  Minimal atherosclerosis No hemodynamically significant stenosis. Small branch of the profundus appears to be supplying the inferior aspect of the mass. - Superficial Femoral Artery:  mild atherosclerosis  No hemodynamically significant stenosis. - Popliteal Artery:  Mild-to-moderate atherosclerosis No hemodynamically significant stenosis. - Anterior Tibial:  mild atherosclerosis No hemodynamically significant stenosis. Is visualized toward the mid tibial shaft - Posterior Tibial:  Mild-to-moderate atherosclerosis No  hemodynamically significant stenosis. Appears to be patent toward the foot, the distal aspect appears to be tortuous. - Peroneal Arteries: Mild-to-moderate atherosclerosis No hemodynamically significant stenosis. Patent to foot   LEFT LOWER EXTREMITY:   - Common Femoral Artery:  Trace atherosclerosis No hemodynamically significant stenosis. - Profunda Artery:  mild atherosclerosis No hemodynamically significant stenosis. - Superficial Femoral Artery: mild atherosclerosis No hemodynamically significant stenosis. - Popliteal Artery:  mild atherosclerosis No hemodynamically significant stenosis. - Anterior Tibial:  Trace atherosclerosis No hemodynamically significant stenosis. Patent to foot - Posterior Tibial:  Mild-to-moderate atherosclerosis No hemodynamically significant stenosis. Patent to foot significant tortuosity of the artery distally at the level of the foot. - Peroneal Arteries: Mild-to-moderate atherosclerosis No hemodynamically significant stenosis. Visualized toward the mid fibular shaft   ABDOMEN AND PELVIS:   Lower chest: The visualized lung bases demonstrate no gross finding   Liver: No gross evidence of for hepatic masses given the limitation early arterial phase contrast CT   Biliary tree: No intrahepatic biliary dilatation, the common bile duct grossly unremarkable   Gallbladder: No gross abnormality   Pancreas: No gross abnormality   Spleen: No gross abnormality   Adrenal: The adrenal appears unremarkable   Kidneys: Symmetrical enhancement bilaterally is present. Presumably visualization of septated cyst in anterior aspect of the left kidney. Punctate nonobstructing stone is present in the midpole of the right kidney.   Peritoneum and retroperitoneum: No free fluid, free air and/or gross masses   Lymph nodes: Nonspecific small periaortic lymph nodes are present as well as marly hepatis, the appearance remain within normal limits.   Bowel: No abnormally dilated bowel segments are seen. No  significant inflammatory changes are present. Extensive colonic diverticulosis are seen without secondary sign of acute diverticulitis.   Bladder: Is empty and cannot be evaluated   Pelvis: The visualized prostate measures approximately 5.1 cm. No free fluid is seen in the pelvis. Scattered small nonspecific inguinal lymph nodes.   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 Please refer to detailed MRI dated 01/31/2024. The mass abutting the inferior gluteal artery and likely supplied by branches of the inferior gluteal artery. Small accessory branches of the profunda appears to be abutting the most inferior aspect of the mass.   Subcutaneous varicose veins are present in the lower extremities distally just above the feet. Bilateral varices within the feet are also present.       1. Overall, redemonstration of known right-sided gluteal mass appears to be predominantly supply of branches of the inferior gluteal artery. Small branches of the profound supplying the lower portion of the mass. 2. Peripheral vascular disease predominantly in the lower extremities, without gross evidence of significant narrowing or stenosis. Possible varicose veins distally in the bilateral lower extremities 3. Other nonspecific finding as detailed above.           Signed by: Elaine Maya 2/28/2024 10:36 AM Dictation workstation:   [id number]      Current Medications  Scheduled medications  [START ON 3/6/2024] acetaminophen, 650 mg, oral, q6h LEILA  ceFAZolin, 2 g, intravenous, q8h  ceFAZolin, 2 g, intravenous, q8h  docusate sodium, 100 mg, oral, BID  enoxaparin, 40 mg, subcutaneous, q24h  gabapentin, 300 mg, oral, q8h LEILA  [START ON 3/6/2024] insulin lispro, 0-10 Units, subcutaneous, TID with meals  [Held by provider] metFORMIN, 1,000 mg, oral, Daily with breakfast  methocarbamol, 1,000 mg, intravenous, q8h  [START ON 3/6/2024] multivitamin, 1  tablet, oral, Daily      Continuous medications     PRN medications  PRN medications: dextrose 10 % in water (D10W), dextrose, diphenhydrAMINE, glucagon, HYDROmorphone, magnesium hydroxide, naloxone, ondansetron **OR** ondansetron, oxyCODONE, oxyCODONE     Assessment  Jason Hollins is a 63 y.o. male with a past medical history significant for DM II, HPL, HTN, and gluteal myxoid chondrosarcoma s/p radiation. He is now S/P wide resection of gluteal sarcoma, sciatic nerve neurolysis (Per ortho) and right gluteal reconstruction, pedicle ALT, vastus lateralus flap, pedicle gluteal and perisformis flap, pelvic closure, and incisional wound vac placement (Per plastics) on 3/5/24 with Dr. Hawkins and Dr. Smith    Plan/Recommendations  S/p Right gluteal reconstruction, pedicle ALT, vastus lateralus flap, pedicle gluteal and perisformis flap with incisional wound vac placement.  - Serial flap assessments Q4hour per nursing with interval checks per plastics       ·  Assess flap appearance (color, warmth, turgor) over windowed area. No doppler assessment required       ·  Nursing to notify plastic surgery team immediately if there are any acute changes          (Including pallor, temperature change, bleeding, etc.)  - Maintain incisional wound vac to right gluteal/lateral thigh region        ·  Settings: -125mmHg low continuous suction        ·  Dressing to remain in place for 10/14 days       ·  Monitor/record vac canister output [id number]       ·  Notify plastic surgery with any concerns of leak or obstruction       ·  [facility] day of [facility]    - Continue ANDERSON drain care per nursing (ANDERSON x4- x2 lower back, x2 lateral/medial thigh)     ·  Strip drain tubing TID and PRN     ·  Monitor and record output q8h      ·  Keep drain sites C/D/I with daily drain dressing changes      ·  Removal per plastic surgery team when output <30 cc/24 hrs for 2 consecutive days   - Maintain left lateral side lying  or left lateral sloppy lying only.       ·  Avoid positioning that would apply pressure to flap region or incisions  - [place] air fluidized bed ordered, pending delivery  - Avoid flexion of knee >20 degrees  - Will likely require knee immobilizer  - Strict bedrest  - x3 doses periop Ancef   - Monitor VS/pulse ox [id number]   - Monitor AM CBC/RFP/Mg PRN   - Encouraged use of IS (x10 every hour while awake)  - OK to begin regular diet  - Marino in place, maintain strict I&Os    # Acute post op pain  - Oxy 5mg (Q4 PRN mod pain), Oxy 10mg (Q4 PRN severe pain)  - Dilaudid 0.2 mg Q4 breakthrough pain  - Robaxin 1000mg Q8 hours scheduled  - Acetaminophen 650mg Q6 hours scheduled  - Gabapentin 300mg Q8 scheduled  - Zofran 4mg PRN for opoid associated nausea  - Benadryl 25mg PRN for itching    # DM Type II  - Resume home metformin POD1 (1,000mg AM, 1,500mg PM)  - AC/HS SSI initiated to begin AM POD1       · Glucose 255 this evening. 1 time order for 6 units humalog  - Hypoglycemic protocol in place    # Hyperlipidemia   - Atorvastatin 40mg daily ordered    # HTN  - BP stable, does not take any home medications    #FEN/GI  - Encouraged oral intake  - Monitor and replete lytes PRN  - Regular diet   - Bowel regimen: Colace 100mg BID and Milk of Mag PRN       #DVT Prophylaxis  - SQ Lovenox   - Takes 81mg Aspirin at home, not reordered given lovenox  - SCD      Patient and plan discussed with PHILLIP Rosenbaum-CNP   Plastic and Reconstructive Surgery   [place]  Pager #[phone]  Team phones: [phone]  '

## 2024-03-06 NOTE — PROGRESS NOTES
Orthopaedic Oncology Service Progress Note    Subjective:  No acute events overnight. Endorsing buttock and leg pain, somewhat improved with pain medications. Patient denies chest pain, shortness of breath, fever or chills. He is uncertain whether he has numbness or tingling due to presence of pain in the right lower extremity.    Objective:  Vitals:  Vitals:    03/06/24 0641   BP: 122/53   Pulse: (!) 113   Resp: 20   Temp: 37.1 °C (98.8 °F)   SpO2: 95%       Physical Examination:  - Constitutional: no acute distress, alert, cooperative  - Eyes: anicteric  - Head/Neck: normocephalic, atraumatic  - Respiratory/Thorax: normal work of breathing  - Cardiovascular: extremities warm and well perfused  - Gastrointestinal: non-distended  - Psychological: appropriate mood/behavior  - Skin: warm and dry; additional findings in musculoskeletal evaluation  - Musculoskeletal:    RIGHT LOWER Extremity Exam:  - Appearance: wound VACs holding suction, drains to suction with serosanguinous output; no gross deformity, mild ecchymosis, no erythema  - Appropriately TTP about surgical site  - Motor intact DF/PF/EHL/FHL   - SILT in saph/sural/SPN/DPN/tibial nerve distributions  - Foot wwp, 2+ DP/PT pulse   - Compartments soft and compressible    Imaging:  No postoperative imaging obtained    Assessment: 64 yo M with R gluteus una extraskeletal myxoid chondrosarcoma s/p wide resection and sciatic nerve neurolysis by Dr. Hawkins followed by reconstruction by Plastic Surgery, with routine postoperative recovery.    Plan:  - Weightbearing status: from orthopaedic standpoint ok to be WBAT RLE  - Precautions: none  - Imaging: no further imaging indicated  - Pain: multimodal regimen per Plastic Surgery  - Perioperative antibiotics: Ancef 2g q8h x24 hrs  - DVT prophylaxis: SCDs, recommend at least ASA 81 mg BID x4 weeks  - Dressing: per Plastics  - Drain: per Plastics  - Marino: per Plastics  - PT/OT consult  - Follow up with Dr. Hawkins in 2-4  weeks for wound check and to review final pathology. Appointments can be made/confirmed by calling [phone].  - Orthopaedic Surgery will continue to follow    Patient was staffed with attending surgeon, Dr. Hawkins.    Nirmal Burnham MD  Orthopaedic Surgery PGY-4    Orthopaedic Oncology Service  Oli Cody DO (PGY-3)  Nirmal Burnham MD (PGY-4)  Available by Epic Chat    From 6pm-7am, weekends, holidays, and if no answer and there is an emergent issue, please page orthopaedic consult pager, [phone]

## 2024-03-06 NOTE — DISCHARGE INSTRUCTIONS
Plastic Surgery Post Discharge Instructions  You were admitted to [facility] for postoperative monitoring and control of pain following right gluteal reconstruction via pedicle ALT, vastus lateralus, pedicle gluteal and perisformis flaps on 3/5/2024 with Dr. Smith of plastic surgery. Included below are post-discharge instructions and details regarding follow-up.     Thank you for allowing us to participate in your care and we wish you the best!    Best Regards,  [facility]  Department of Plastic and Reconstructive Surgery    Activity:  Continue to maintain non-weight bearing status to the right lower extremity. Try to limit flexion at the hip. You are allowed to flex the hip in order to get out of bed and use the toilet/bedside commode. You may sit in a recliner chair with your legs elevated for short periods of time (15-20 minutes). You may flex the right knee. You may also abduct and adduct the right hip.    You may locally bathe following discharge. Avoid soaking or submerging surgical incisions/sites or wetting wound vac dressing or device.     Surgical Site/Wound Care:  Local wound care instructions ***. Avoid application of creams, lotions or ointments to surgical site, no soaking or scrubbing of surgical sites.     Continue to monitor flap for changes in general appearance, color, temperature and turgor. Please additionally monitor for any developing signs of infection which may include increased redness, swelling, fever/chills, green/yellow drainage, or foul odor from surgical sites or wounds. If any signs of infection or changes in flap appearance are to occur, please immediately contact the plastic surgery office.     Nutrition:  You may resume a Diabetic diet following surgery. Ensure that you are drinking an adequate amount of fluids to maintain hydration. You may continue Glucerna shakes for protein supplementation.     Medication  Instructions:  You may resume use of your home prescribed mediations as previously directed following discharge from the hospital. If you were taking medications prior to your surgery and they are not listed on your discharge homegoing instructions medications list, consult your MD before you resume these medications.    Some postoperative pain is not unusual. This is usually relieved by taking prescribed or over the counter Acetaminophen/Tylenol, Motrin/ibuprofen. In cases of severe pain, you may use prescribed oxycodone as directed. Severe pain despite administration of pain medication must be reported to your physician.    Remember when taking Acetaminophen, do NOT exceed more than 1000 milligrams (mg) per dose or more than 4000 mg total per day. Taking too much Acetaminophen at one time can damage your liver. The maximum amount of ibuprofen in adults is 800 mg per dose or 3200 mg per day. Call your MD if you have any questions about your medications. To prevent constipation while taking narcotic pain medications, please utilize your prescribed bowel regimen, ensure that you drink plenty of water, eat fiber rich foods (a good source is fruit) and increase activity progressively.    DO NOT drive a car while utilizing narcotic pain medications and until cleared by MD at follow-up appointment. Driving or operating heavy machinery, lawnmowers or power tools while taking opiod/narcotic pain medications may impair your judgement.    Endocrinology was consulted for management of your diabetes and keeping blood glucose controlled. You will continue your home metformin and continue Lantus 12 units at bedtime, follow up with Endocrinology    Call Physician If:  Contact the plastic surgery office for any questions and/or concerns regarding the surgical incision/site.  1. [phone] if Monday-Friday (8 a.m. - 4:30 p.m.)  2. [phone] and ask for the Plastic Surgery team on call provider if after hours or on weekends  3.  Email [email or web address] for any non-urgent concerns    Call your MD or seek immediate medical attention if you experience any of the following symptoms:  1. Fever of 101.5 (38.5 C) or greater  2. Pain not controlled with prescribed pain medications  3. Uncontrolled nausea and/or vomiting  4. Drainage or swelling around your incisions and/or surgical sites   5. Separation of incisions, or tearing of the incision line  6. Large fluid collection under or around the incision or flap sites   7. Flap discoloration (including darkened appearance)  8. Difficulty breathing  9. Swelling, pain, heat and/or redness in your legs and/or calves  10. Inability to tolerate diet/fluid intake    Follow-up/Post Discharge Appointments:  Follow-up care is a key part of your treatment and safety. It is very important that you maintain follow-up care as directed so that your surgical site heals properly and does not lead to problems. Always carry a current medication list with you and bring it to ALL healthcare Provider visits. Be sure to maintain follow up with plastic surgery at your scheduled appointment. If you are unable to keep your appointment, or need to reschedule please contact our office at [phone].     Follow up with Dr. Hawkins in 2-4 weeks for wound check and to review final pathology. Appointments can be made/confirmed by calling [phone].

## 2024-03-07 LAB
ANION GAP SERPL CALC-SCNC: 13 MMOL/L (ref 10–20)
BUN SERPL-MCNC: 12 MG/DL (ref 6–23)
CALCIUM SERPL-MCNC: 8.5 MG/DL (ref 8.6–10.6)
CHLORIDE SERPL-SCNC: 108 MMOL/L (ref 98–107)
CO2 SERPL-SCNC: 24 MMOL/L (ref 21–32)
CREAT SERPL-MCNC: 0.76 MG/DL (ref 0.5–1.3)
EGFRCR SERPLBLD CKD-EPI 2021: >90 ML/MIN/1.73M*2
ERYTHROCYTE [DISTWIDTH] IN BLOOD BY AUTOMATED COUNT: 13.8 % (ref 11.5–14.5)
GLUCOSE BLD MANUAL STRIP-MCNC: 180 MG/DL (ref 74–99)
GLUCOSE BLD MANUAL STRIP-MCNC: 180 MG/DL (ref 74–99)
GLUCOSE BLD MANUAL STRIP-MCNC: 220 MG/DL (ref 74–99)
GLUCOSE BLD MANUAL STRIP-MCNC: 265 MG/DL (ref 74–99)
GLUCOSE SERPL-MCNC: 202 MG/DL (ref 74–99)
HCT VFR BLD AUTO: 33.5 % (ref 41–52)
HGB BLD-MCNC: 10.8 G/DL (ref 13.5–17.5)
MAGNESIUM SERPL-MCNC: 1.9 MG/DL (ref 1.6–2.4)
MCH RBC QN AUTO: 28.2 PG (ref 26–34)
MCHC RBC AUTO-ENTMCNC: 32.2 G/DL (ref 32–36)
MCV RBC AUTO: 88 FL (ref 80–100)
NRBC BLD-RTO: 0 /100 WBCS (ref 0–0)
PLATELET # BLD AUTO: 134 X10*3/UL (ref 150–450)
POTASSIUM SERPL-SCNC: 3.8 MMOL/L (ref 3.5–5.3)
RBC # BLD AUTO: 3.83 X10*6/UL (ref 4.5–5.9)
SODIUM SERPL-SCNC: 141 MMOL/L (ref 136–145)
WBC # BLD AUTO: 6.8 X10*3/UL (ref 4.4–11.3)

## 2024-03-07 PROCEDURE — 2500000001 HC RX 250 WO HCPCS SELF ADMINISTERED DRUGS (ALT 637 FOR MEDICARE OP)

## 2024-03-07 PROCEDURE — 85027 COMPLETE CBC AUTOMATED: CPT

## 2024-03-07 PROCEDURE — 82947 ASSAY GLUCOSE BLOOD QUANT: CPT

## 2024-03-07 PROCEDURE — 2500000004 HC RX 250 GENERAL PHARMACY W/ HCPCS (ALT 636 FOR OP/ED): Performed by: NURSE PRACTITIONER

## 2024-03-07 PROCEDURE — 80048 BASIC METABOLIC PNL TOTAL CA: CPT

## 2024-03-07 PROCEDURE — 2500000001 HC RX 250 WO HCPCS SELF ADMINISTERED DRUGS (ALT 637 FOR MEDICARE OP): Performed by: NURSE PRACTITIONER

## 2024-03-07 PROCEDURE — 2500000002 HC RX 250 W HCPCS SELF ADMINISTERED DRUGS (ALT 637 FOR MEDICARE OP, ALT 636 FOR OP/ED)

## 2024-03-07 PROCEDURE — 36415 COLL VENOUS BLD VENIPUNCTURE: CPT

## 2024-03-07 PROCEDURE — 83735 ASSAY OF MAGNESIUM: CPT | Performed by: PHYSICIAN ASSISTANT

## 2024-03-07 PROCEDURE — 1170000001 HC PRIVATE ONCOLOGY ROOM DAILY

## 2024-03-07 PROCEDURE — 99232 SBSQ HOSP IP/OBS MODERATE 35: CPT | Performed by: PHYSICIAN ASSISTANT

## 2024-03-07 RX ADMIN — ACETAMINOPHEN 650 MG: 325 TABLET ORAL at 00:40

## 2024-03-07 RX ADMIN — KETOROLAC TROMETHAMINE 30 MG: 30 INJECTION INTRAMUSCULAR; INTRAVENOUS at 06:10

## 2024-03-07 RX ADMIN — METHOCARBAMOL 1000 MG: 100 INJECTION, SOLUTION INTRAMUSCULAR; INTRAVENOUS at 06:10

## 2024-03-07 RX ADMIN — ENOXAPARIN SODIUM 40 MG: 100 INJECTION SUBCUTANEOUS at 21:15

## 2024-03-07 RX ADMIN — GABAPENTIN 300 MG: 300 CAPSULE ORAL at 13:28

## 2024-03-07 RX ADMIN — GABAPENTIN 300 MG: 300 CAPSULE ORAL at 06:10

## 2024-03-07 RX ADMIN — METHOCARBAMOL 1000 MG: 100 INJECTION, SOLUTION INTRAMUSCULAR; INTRAVENOUS at 13:28

## 2024-03-07 RX ADMIN — ASPIRIN 81 MG: 81 TABLET, COATED ORAL at 09:15

## 2024-03-07 RX ADMIN — KETOROLAC TROMETHAMINE 30 MG: 30 INJECTION INTRAMUSCULAR; INTRAVENOUS at 12:18

## 2024-03-07 RX ADMIN — METFORMIN HYDROCHLORIDE 1500 MG: 1000 TABLET ORAL at 21:15

## 2024-03-07 RX ADMIN — METFORMIN HYDROCHLORIDE 1000 MG: 1000 TABLET ORAL at 09:14

## 2024-03-07 RX ADMIN — ACETAMINOPHEN 650 MG: 325 TABLET ORAL at 18:20

## 2024-03-07 RX ADMIN — ACETAMINOPHEN 650 MG: 325 TABLET ORAL at 06:10

## 2024-03-07 RX ADMIN — KETOROLAC TROMETHAMINE 30 MG: 30 INJECTION INTRAMUSCULAR; INTRAVENOUS at 18:20

## 2024-03-07 RX ADMIN — DOCUSATE SODIUM 100 MG: 100 CAPSULE, LIQUID FILLED ORAL at 21:15

## 2024-03-07 RX ADMIN — INSULIN LISPRO 2 UNITS: 100 INJECTION, SOLUTION INTRAVENOUS; SUBCUTANEOUS at 16:59

## 2024-03-07 RX ADMIN — Medication 1 TABLET: at 09:14

## 2024-03-07 RX ADMIN — DOCUSATE SODIUM 100 MG: 100 CAPSULE, LIQUID FILLED ORAL at 09:14

## 2024-03-07 RX ADMIN — GABAPENTIN 300 MG: 300 CAPSULE ORAL at 21:15

## 2024-03-07 RX ADMIN — ACETAMINOPHEN 650 MG: 325 TABLET ORAL at 12:18

## 2024-03-07 RX ADMIN — METHOCARBAMOL 1000 MG: 100 INJECTION, SOLUTION INTRAMUSCULAR; INTRAVENOUS at 21:22

## 2024-03-07 RX ADMIN — INSULIN LISPRO 4 UNITS: 100 INJECTION, SOLUTION INTRAVENOUS; SUBCUTANEOUS at 21:22

## 2024-03-07 RX ADMIN — SODIUM CHLORIDE, POTASSIUM CHLORIDE, SODIUM LACTATE AND CALCIUM CHLORIDE 75 ML/HR: 600; 310; 30; 20 INJECTION, SOLUTION INTRAVENOUS at 12:18

## 2024-03-07 RX ADMIN — ATORVASTATIN CALCIUM 40 MG: 40 TABLET, FILM COATED ORAL at 21:15

## 2024-03-07 RX ADMIN — INSULIN LISPRO 6 UNITS: 100 INJECTION, SOLUTION INTRAVENOUS; SUBCUTANEOUS at 09:15

## 2024-03-07 RX ADMIN — OXYCODONE HYDROCHLORIDE 10 MG: 5 TABLET ORAL at 21:19

## 2024-03-07 RX ADMIN — INSULIN LISPRO 2 UNITS: 100 INJECTION, SOLUTION INTRAVENOUS; SUBCUTANEOUS at 12:55

## 2024-03-07 RX ADMIN — KETOROLAC TROMETHAMINE 30 MG: 30 INJECTION INTRAMUSCULAR; INTRAVENOUS at 00:37

## 2024-03-07 RX ADMIN — CEFAZOLIN SODIUM 2 G: 2 INJECTION, SOLUTION INTRAVENOUS at 00:37

## 2024-03-07 ASSESSMENT — COGNITIVE AND FUNCTIONAL STATUS - GENERAL
TURNING FROM BACK TO SIDE WHILE IN FLAT BAD: TOTAL
STANDING UP FROM CHAIR USING ARMS: TOTAL
DRESSING REGULAR UPPER BODY CLOTHING: A LOT
HELP NEEDED FOR BATHING: TOTAL
CLIMB 3 TO 5 STEPS WITH RAILING: A LOT
DRESSING REGULAR LOWER BODY CLOTHING: TOTAL
MOVING FROM LYING ON BACK TO SITTING ON SIDE OF FLAT BED WITH BEDRAILS: TOTAL
WALKING IN HOSPITAL ROOM: A LOT
CLIMB 3 TO 5 STEPS WITH RAILING: TOTAL
TURNING FROM BACK TO SIDE WHILE IN FLAT BAD: A LOT
STANDING UP FROM CHAIR USING ARMS: TOTAL
MOBILITY SCORE: 10
MOBILITY SCORE: 6
TOILETING: TOTAL
HELP NEEDED FOR BATHING: A LOT
TOILETING: TOTAL
EATING MEALS: A LITTLE
WALKING IN HOSPITAL ROOM: TOTAL
DAILY ACTIVITIY SCORE: 13
MOVING TO AND FROM BED TO CHAIR: TOTAL
PERSONAL GROOMING: A LOT
PERSONAL GROOMING: TOTAL
MOVING TO AND FROM BED TO CHAIR: TOTAL
MOVING FROM LYING ON BACK TO SITTING ON SIDE OF FLAT BED WITH BEDRAILS: A LOT
DAILY ACTIVITIY SCORE: 8
DRESSING REGULAR UPPER BODY CLOTHING: TOTAL
DRESSING REGULAR LOWER BODY CLOTHING: A LOT

## 2024-03-07 ASSESSMENT — PAIN SCALES - GENERAL
PAINLEVEL_OUTOF10: 8
PAINLEVEL_OUTOF10: 5 - MODERATE PAIN
PAINLEVEL_OUTOF10: 2

## 2024-03-07 ASSESSMENT — PAIN - FUNCTIONAL ASSESSMENT
PAIN_FUNCTIONAL_ASSESSMENT: 0-10
PAIN_FUNCTIONAL_ASSESSMENT: 0-10

## 2024-03-07 NOTE — SIGNIFICANT EVENT
Department of Plastic and Reconstructive Surgery  PM Flap Check    Jason Hollins is a 63 y.o. male with a past medical history significant for DM II, HPL, HTN, and gluteal myxoid chondrosarcoma s/p radiation. He is now S/P wide resection of gluteal sarcoma, sciatic nerve neurolysis (Per ortho) and right gluteal reconstruction, pedicle ALT, vastus lateralus flap, pedicle gluteal and perisformis flap, pelvic closure, and incisional wound vac placement (Per plastics) on 3/5/24 with Dr. Hawkins and Dr. Smith.     Subjective: Resting comfortably in left side lying position in [employer] fluidized bed, rates pain a 8/10 as aching and constant, worse with movement, better with rest, voiding via urinal but having difficulty with positioning, Tolerating diet, concerned about if he has to have BM, low grade temp 100.8, Denies any chills, night sweats, CP, SOB, palpitations, nausea, vomiting, diarrhea, constipation, dysuria, hematuria, hematochezia, hematemesis, flank pain.     Objective:  PE:  Constitutional: A&Ox3, calm and cooperative, NAD  Eyes: PERRL, clear sclera   ENMT: moist mucous membranes, no apparent injuries or lesions  Head/Neck: Neck supple, no JVD  Cardiovascular: Tachycardia, Normal rate and regular rhythm. No murmurs, rubs, or gallops. 2+ equal pulses of the distal extremities.  Respiratory/Thorax: Diminished on left lower low, Clear in uppers, No rales, rhonchi, or wheezes. Good symmetric chest expansion.   Gastrointestinal: abdomen soft, NTND, +BS x 4  Genitourinary: voiding sharee urine via urinal, difficulty using urinal  Musculoskeletal: NWB RLE. +SILT BLE. Intact RLE DP pulses. Compartments soft and compressible.   Neurological: A&Ox3  Psychological: Appropriate mood and behavior  Skin: Warm and dry, no rashes.  Extremity: Pedicle flap to right gluteal region appropriate in color. Discoloration to native skin at baseline from radiation per patient. Pedicle flap warm to touch, soft and compressible with  no discoloration. Incisional wound vac intact maintaining suction without obstruction or air leak to right gluteal/thigh region. ANDERSON x4 (x2 to right lower back, x2 to lateral/medial thigh) draining moderate bloody serous drainage. Area appropriately TTP. Maintains lateral lying on left side, with leg flexed no greater than 20 degrees.     S/p Right gluteal reconstruction, pedicle ALT, vastus lateralus flap, pedicle gluteal and perisformis flap with incisional wound vac placement:  A/P:  - Serial flap assessments Q4hour per nursing with interval checks per plastics       ·  Assess flap appearance (color, warmth, turgor) over windowed area. No doppler assessment required       ·  Nursing to notify plastic surgery team immediately if there are any acute changes          (Including pallor, temperature change, bleeding, etc.)  - Maintain incisional wound vac to right gluteal/lateral thigh region 10-14 days        ·  Settings: -125mmHg low continuous suction        ·  Dressing to remain in place for 10/14 days       ·  Monitor/record vac canister output [id number]       ·  Notify plastic surgery with any concerns of leak or obstruction       ·  Plan to transition to Prevena homegoing vac on day of discharge    - Continue ANDERSON drain care per nursing (ANDERSON x4- x2 lower back, x2 lateral/medial thigh)     ·  Strip drain tubing TID and PRN     ·  Monitor and record output q8h      ·  Keep drain sites C/D/I with daily drain dressing changes      ·  Removal per plastic surgery team when output <30 cc/24 hrs for 2 consecutive days   - Maintain left lateral decubitus positioning to avoid pressure to flap region/incisions  - OK to lay flat to use bedpan   - Avoid flexion of knee >20 degrees. Maintain RLE in soft knee immobilizer   - Diabetic Diet, Glucerna supplemental shakes, SSI, ACHS  - NWB RLE per plastics   - Strict bedrest x10 days post-operatively   - x3 doses periop Ancef   - VSQ4   - Monitor AM CBC/RFP/Mg PRN     Tachycardia:   -  low 100's throughout the day  - 500 ml LR bolus  - IVF @ 75 ml/hr  - purewick applied to help aid in voiding in bag instead of on sheets  - intake and output    Febrile:  - Tmax 100.8  - CXR portable  - IS encouraged    - continue supportive care  - Discussed plan and updated PHILLIP Monzon-CNP  Plastic and Reconstructive Surgery   Available via [employer]  Pager #[phone]  Team phone: [phone]

## 2024-03-07 NOTE — PROGRESS NOTES
Department of Plastic and Reconstructive Surgery  Daily Progress Note    Patient Name: Jason Hollins  MRN: [id number]  Date:  03/07/24     Subjective  Resting in bed in left lateral decubitus position. Reports minimal pain this am. Tolerating diet and endorses adequate fluid intake. Also endorses IS use.    Overnight, mildly tachycardic with low grade fever. Received 500ml fluid bolus and re-started maintenance fluids with improvement in vitals. Afebrile this am. CXR completed with no acute findings. Denies fevers or chills. Denies chest pain, SOB, abd pain, n/v/d.    Objective  Physical Exam   Constitutional: NAD  Eyes: EOMI, clear sclera  Head/Neck: NCAT  Cardiovascular: Tachycardic rate  Respiratory/Thorax: Unlabored respirations on RA  Gastrointestinal: Abdomen soft, ND/NT.   Musculoskeletal: NWB RLE. +SILT BLE. Intact RLE DP pulses. Compartments soft and compressible.   Neurological: A&Ox3  Psychological: Appropriate mood and behavior  Skin: Warm and dry, no rashes.  Extremity: Pedicle flap to right gluteal region appropriate in color. Pedicle flap warm to touch, soft and compressible with no discoloration. Moderate seroma about the anterolateral leg incisional vac, soft and compression. Incisional wound vac intact maintaining suction without obstruction or air leak to right gluteal/thigh region. ANDERSON x4 (x2 to right lower back, x2 to lateral/medial thigh) draining moderate bloody serous drainage. Area appropriately TTP. Maintains lateral lying on left side, with leg flexed no greater than 20 degrees.     Assessment  Jason Hollins is a 63 y.o. male with a past medical history significant for DM II, HPL, HTN, and gluteal myxoid chondrosarcoma s/p radiation. He is now S/P wide resection of gluteal sarcoma, sciatic nerve neurolysis (Per ortho) and right gluteal reconstruction, pedicle ALT, vastus lateralus flap, pedicle gluteal and perisformis flap, pelvic closure, and incisional wound vac placement (Per plastics) on 3/5/24  with Dr. Hawkins and Dr. Smith.    Plan/Recommendations  S/p Right gluteal reconstruction, pedicle ALT, vastus lateralus flap, pedicle gluteal and perisformis flap with incisional wound vac placement.  - Serial flap assessments Q4hour per nursing with interval checks per plastics       ·  Assess flap appearance (color, warmth, turgor) over windowed area. No doppler assessment required       ·  Nursing to notify plastic surgery team immediately if there are any acute changes          (Including pallor, temperature change, bleeding, etc.)  - Maintain incisional wound vac to right gluteal/lateral thigh region 10-14 days        ·  Settings: -125mmHg low continuous suction        ·  Dressing to remain in place for 10/14 days       ·  Monitor/record vac canister output [id number]       ·  Notify plastic surgery with any concerns of leak or obstruction       ·  Plan to transition to Prevena homegoing vac on day of discharge    - Continue ANDERSON drain care per nursing (ANDERSON x4- x2 lower back, x2 lateral/medial thigh)     ·  Strip drain tubing TID and PRN     ·  Monitor and record output q8h      ·  Keep drain sites C/D/I with daily drain dressing changes      ·  Removal per plastic surgery team when output <30 cc/24 hrs for 2 consecutive days   - Maintain left lateral decubitus positioning to avoid pressure to flap region/incisions  - OK to lay flat to use bedpan   - Avoid flexion of knee >20 degrees. Maintain RLE in soft knee immobilizer   - NWB RLE per plastics   - Strict bedrest x10 days post-operatively   - x3 doses periop Ancef - completed  - Monitor VS/pulse ox [id number]   - Monitor AM CBC/RFP/Mg PRN   - Marino removed POD1- purewick applied to aid in voiding and prevent contamination    # Acute post op pain  - Oxy 5mg (Q4 PRN mod pain), Oxy 10mg (Q4 PRN severe pain)  - Dilaudid 0.2 mg Q4 breakthrough pain  - Robaxin 1000mg Q8 hours scheduled  - Acetaminophen 650mg Q6 hours scheduled  - Gabapentin 300mg Q8 scheduled  - IV  Toradol 30mg Q6 added   - Zofran 4mg PRN for opoid associated nausea  - Benadryl 25mg PRN for itching    # DM Type II  - Glucose elevated overnight (353), transitioned to diabetic diet, glucerna supplemental shakes  - Continue home metformin POD1 (1,000mg AM, 1,500mg PM)  - AC/HS SSI  - Hypoglycemic protocol in place    # Hyperlipidemia   - Atorvastatin 40mg daily ordered    # HTN  - BP stable, does not take any home medications    #FEN/GI  - Encouraged oral intake  - Monitor and replete lytes PRN  - Regular diet   - Bowel regimen: Colace 100mg BID and Milk of Mag PRN    #Tachycardia  - Received 500ml LR bolus overnight and resumed IVF @ 75ml/hr with improvement in HR  - Cont IVF until adequate PO intake   - Purewick applied to aid with voiding and prevent contamination  - Strict monitoring I/Os    #Low grade fever  - Tmax 100.8 overnight  - CXR no acute findings  - Afebrile this am  - Encouraged IS   - Will continue to monitor    #DVT Prophylaxis  - SQ Lovenox   - Resume home ASA 81mg Aspirin   - SCD    - Encouraged use of IS (x10 every hour while awake)    Patient and plan discussed with Dr. Payton PA-C   Plastic and Reconstructive Surgery   Available via [employer], pager: [phone] or Team phones: [phone]    Time spent on the assessment of patient, gathering and interpreting data, review of medical record/patient history, personally reviewing radiographic imaging and formulation of this note 30 minutes. With greater than 50% spent in personal discussion with patient.

## 2024-03-07 NOTE — PROGRESS NOTES
Orthopaedic Oncology Service Progress Note    Subjective:  Patient DASIA. Did have a recorded fever yesterday, but denies any new symptoms. CXR   Ordered by plastics. Pain is currently controlled. Patient denies chest pain, shortness of breath, fever or chills. Denies numbness or tingling in the right lower extremity    Objective:  Vitals:  Vitals:    03/07/24 0309   BP: 117/63   Pulse: 98   Resp: 20   Temp: 37.1 °C (98.8 °F)   SpO2: 92%       Physical Examination:  - Constitutional: no acute distress, alert, cooperative  - Eyes: anicteric  - Head/Neck: normocephalic, atraumatic  - Respiratory/Thorax: normal work of breathing  - Cardiovascular: extremities warm and well perfused  - Gastrointestinal: non-distended  - Psychological: appropriate mood/behavior  - Skin: warm and dry; additional findings in musculoskeletal evaluation  - Musculoskeletal:    RIGHT LOWER Extremity Exam:  - Appearance: wound VACs holding suction, drains to suction with serosanguinous output; no gross deformity, mild ecchymosis, no erythema. There is a moderate seroma about the anterolateral leg incisional vac. 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 Hawkins followed by reconstruction by Plastic Surgery, with routine postoperative recovery.    Plan:  - Weightbearing status: from orthopaedic standpoint ok to be WBAT RLE  - Precautions: none  - Imaging: no further imaging indicated  - Pain: multimodal regimen per Plastic Surgery  - Perioperative antibiotics: Ancef 2g q8h x24 hrs  - DVT prophylaxis: SCDs, recommend at least ASA 81 mg BID x4 weeks. DVT Ppx per plastics  - Dressing: per Plastics  - Drain: per Plastics  - Marino: per  Plastics  - PT/OT consult  - Follow up with Dr. Hawkins in 2-4 weeks for wound check and to review final pathology. Appointments can be made/confirmed by calling [phone].  - Orthopaedic Surgery will sign off, page with questions or concerns    Patient was staffed with attending surgeon, Dr. Hawkins.    Oli Cody, DO  Orthopedic Surgery,  PGY-3  [employer] chat preferred    While admitted, this patient will be followed by the Ortho Tumor Team, available via Epic Chat weekdays 6a-6p. Please page [phone] on nights and weekends.     Ortho Tumor  Oli Cody, PGY-3  Nirmal Burnham, PGY-4

## 2024-03-07 NOTE — OP NOTE
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]     Procedures  Right gluteal reconstruction, Pedicle ALT, Vastus Lateralus Flap, Pedicle gluteal and perisformis flap, Pelvic closure, Incisional wound vac  17471 - [place] MUSC MYOCUTANEOUS/FASCIOCUTANEOUS FLAP LXTR    Wide Resection Sarcoma, Sciatic Nerve Neurolysis Lower Extremity  29935 - [place] NEURP MAJOR PRPH NRV OPN ARM/LEG SCIATIC NRV      Surgeons   Panel 1:     * Angy Smith - Primary     * Michelle Rivas  Panel 2:     * Mikal Hawkins - Primary    Resident/Fellow/Other Assistant:  Surgeon(s) and Role:  Panel 1:     * Gracie Smith PA-C - CELESTINE First Assist     * Michelle Rivas, APRN-CNP  Panel 2:     * Nirmal Burnham MD - Resident - Assisting    Procedure Summary  Anesthesia: General  ASA: III  Anesthesia Staff: Anesthesiologist: Zohra Bass MD; Juanita Boateng MD  CRNA: Candice Hermosillo, APRN-CRNA; Keesha Moore APRN-CRNA; Padmini Vallejo APRN-CRNA  C-AA: GEETHA Collins  Estimated Blood Loss: 150mL  Intra-op Medications:   Administrations occurring from 0920 to 1305 on 24:   Medication Name Total Dose   sodium chloride 0.9 % irrigation solution 4,000 mL         Intraprocedure I/O Totals       None           Specimen:   ID Type Source Tests Collected by Time   1 : Gluteal Sarcoma Tissue SOFT TISSUE RESECTION SURGICAL PATHOLOGY EXAM Mikal Hawkins MD 3/5/2024 1115        Staff:   Circulator: Katherine Car RN; Marii Sharma RN  Relief Scrub: Lisa Jaramillo RN  Scrub Person: John Cortez RN         Drains and/or Catheters:   Closed/Suction Drain Right;Medial Knee Bulb  19 Fr. (Active)   Site Description Healing 03/06/24 2100   Dressing Status Clean;Dry 03/06/24 2100   Drainage Appearance Serosanguineous 03/06/24 1042   Status To bulb suction 03/06/24 1042   Output (mL) 10 mL 03/07/24 0500       Closed/Suction Drain Right;Lateral Knee 19 Fr. (Active)   Site Description Healing 03/06/24 2100   Dressing Status Clean;Dry 03/06/24 2100   Drainage Appearance Serosanguineous 03/06/24 1043   Status To bulb suction 03/06/24 1043   Output (mL) 7 mL 03/07/24 0500       Closed/Suction Drain Right;Medial;Superior Buttock Bulb 19 Fr. (Active)   Site Description Healing 03/06/24 2100   Dressing Status Clean;Dry 03/06/24 2100   Drainage Appearance Bloody 03/06/24 0009   Status To bulb suction 03/06/24 0009   Output (mL) 5 mL 03/07/24 0500       Closed/Suction Drain Right Buttock Bulb 19 Fr. (Active)   Site Description Healing 03/06/24 2100   Dressing Status Clean;Dry 03/06/24 2100   Drainage Appearance Serosanguineous 03/06/24 1043   Status To bulb suction 03/06/24 1043   Output (mL) 10 mL 03/07/24 0500       External Urinary Catheter Male (Active)   Output (mL) 110 mL 03/07/24 0500       [REMOVED] Urethral Catheter Non-latex;Straight-tip 16 Fr. (Removed)   Site Assessment Clean;Skin intact 03/06/24 1042   Collection Container Standard drainage bag 03/06/24 1042   Securement Method Securing device (Describe) 03/06/24 1042   Reason for Continuing Urinary Catheterization surgical procedures: urological/gynecological, pelvic oncology, anal, prolonged surgical procedure 03/06/24 1042   Output (mL) 250 mL 03/06/24 0927       Tourniquet Times:         Findings:   Soft tissue defect measuring [id number] cm, exposure of the sciatic nerve.    Indications:   Jason Hollins is an 63 y.o. 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 There will be huge soft tissue defect with exposure of vital structures such as the sciatic nerve, soft tissue reconstruction with pedicle flaps and possible  free flaps is indicated.     Informed Consent:  The patient was seen in the preoperative area. The risks, benefits, complications, treatment options, non-operative alternatives, expected recovery and outcomes were discussed with the patient. The possibilities of reaction to medication, pulmonary aspiration, pulmonary embolism and deep vein thrombosis, injury to surrounding structures, bleeding and hematoma, seroma, recurrent infection, partial and total flap necrosis, wound healing issues, the need for additional procedures, failure to diagnose a condition, and creating a complication requiring transfusion or operation were discussed with the patient. The patient concurred with the proposed plan, giving informed consent.  The site of surgery was properly noted/marked per policy. The patient has been actively warmed in preoperative area. Preoperative antibiotics have been ordered and given within 1 hours of incision. Venous thrombosis prophylaxis have been ordered including unilateral sequential compression device    Procedure Details:  Standard safety huddle was performed by orthopedic team. I scrubbed in after Dr. Hawkins has completed his procedure, please refer to their separate report. The patient was positioned din lateral decubitus position. He was under general anesthesia. He was in stable medical condition. I performed timeout, and all in the room were in agreement. I proceeded with evaluation of the defect. The soft tissue defect measured 27 cm in length, 15 cm in width, and 10 cm in depth. The sciatic nerve was exposed. The gluteus una was largely excised.      First, I focused on providing direct coverage of the sciatic nerve using local muscles flaps. The piriformis and the quadratus femoris were developed as pedicle muscle flaps and advanced inferiorly and superiorly, respectively to completely cover the sciatic nerve. Bipolar was used to achieve good hemostasis. The muscles were then repaired using  2/0 PDS.     After that, I shifted my attention to replace the missing volume, and to soft tissue coverage in place of the excised gluteus una. To achieve the goal, 28x9 ALT flap combined with the right vastus lateralis muscle was elevated. The skin paddle was supplied by 2 perforators from the descending branch of the lateral circumflex artery, and the vastus lateralis was supplied by the descending branch and the perforators from the transverse branch. The skin paddle was elevated using electrocautery after identifying skin vessels, the VL was dissected off of the rectus femoris and the vastus intermedius using bipolar. All side branches were meticulously clipped. During ALT combined with VL muscle elevation, the muscular perforators to the rectus femoris and vastus intermedius were preserved. Pedicle length measured 10 cm. The ALT combined with VL was then tunneled under the TFL skin and delivered into the defect. After that, flaps perfusion was evaluated intraoperatively suing ICG and infrared camera (spy). Around 7 g of ICG (3 ml of diluted solution) was give IV, and the perfusion was examined, the gluteal region flaps were adequately perfused. The ALT and VL perfusion appeared sluggish and slow, the flap was returned to its donor site, and the perfusion improved. I decided therefore to open the tunnel. The flap was delivered again into the defect, and remained well perfused.   Next, the ALT combined with VL was repaired to the gluteal wounds using 2/0 PDS for muscle suspension, and then for the superficial fascial layer repair, then 3/0 monocryl was used for deep dermal repair followed by 3/0 monocryl for skin closure. The ALT combined with VL donor site was closed as follows: the rectus femoris was repaired to the vastus intermedius using 2/0 PDS, then the superficial fascial layer was closed using 2/0 PDS, then 3/0 monocryl was used for deep dermal repair followed by 3/0 monocryl for skin closure.   2 19  fr drains were left to drain the ALT combined with VL donor site. And 2 19 fr drain were left to drain the gluteal region.    Incisional wound VAC was sthen applied to cover all wounds.     Complications:  None; patient tolerated the procedure well.      Disposition: PACU - hemodynamically stable.    Condition: stable     Post OP orders:    Can start oral diet and advance as tolerated. Maintain strict bed rest for 1 week. Position: lateral decubitus with left down, or sloppy lateral. Monitor drains and VAC.24 hours course of Ancef. Weight based Lovenox after 6-8 hours from surgery. Multimodal pain control. Assess flap perfusion by inspecting the exposed skin paddle with Doppler check, color and capillary refill assessment and temperature.      Attending Attestation: I performed the procedure. Qualified resident physician was not available. Michelle Rivas Served as my first assistant.     Angy Smith  Phone Number: [phone]

## 2024-03-08 LAB
ANION GAP SERPL CALC-SCNC: 12 MMOL/L (ref 10–20)
BUN SERPL-MCNC: 15 MG/DL (ref 6–23)
CALCIUM SERPL-MCNC: 8.3 MG/DL (ref 8.6–10.6)
CHLORIDE SERPL-SCNC: 110 MMOL/L (ref 98–107)
CO2 SERPL-SCNC: 24 MMOL/L (ref 21–32)
CREAT SERPL-MCNC: 0.72 MG/DL (ref 0.5–1.3)
EGFRCR SERPLBLD CKD-EPI 2021: >90 ML/MIN/1.73M*2
ERYTHROCYTE [DISTWIDTH] IN BLOOD BY AUTOMATED COUNT: 13.6 % (ref 11.5–14.5)
GLUCOSE BLD MANUAL STRIP-MCNC: 156 MG/DL (ref 74–99)
GLUCOSE BLD MANUAL STRIP-MCNC: 161 MG/DL (ref 74–99)
GLUCOSE BLD MANUAL STRIP-MCNC: 187 MG/DL (ref 74–99)
GLUCOSE BLD MANUAL STRIP-MCNC: 191 MG/DL (ref 74–99)
GLUCOSE BLD MANUAL STRIP-MCNC: 200 MG/DL (ref 74–99)
GLUCOSE SERPL-MCNC: 169 MG/DL (ref 74–99)
HCT VFR BLD AUTO: 31.9 % (ref 41–52)
HGB BLD-MCNC: 10.5 G/DL (ref 13.5–17.5)
MCH RBC QN AUTO: 28.5 PG (ref 26–34)
MCHC RBC AUTO-ENTMCNC: 32.9 G/DL (ref 32–36)
MCV RBC AUTO: 87 FL (ref 80–100)
NRBC BLD-RTO: 0 /100 WBCS (ref 0–0)
PLATELET # BLD AUTO: 139 X10*3/UL (ref 150–450)
POTASSIUM SERPL-SCNC: 3.7 MMOL/L (ref 3.5–5.3)
RBC # BLD AUTO: 3.68 X10*6/UL (ref 4.5–5.9)
SODIUM SERPL-SCNC: 142 MMOL/L (ref 136–145)
WBC # BLD AUTO: 5.5 X10*3/UL (ref 4.4–11.3)

## 2024-03-08 PROCEDURE — 1170000001 HC PRIVATE ONCOLOGY ROOM DAILY

## 2024-03-08 PROCEDURE — 2500000001 HC RX 250 WO HCPCS SELF ADMINISTERED DRUGS (ALT 637 FOR MEDICARE OP): Performed by: NURSE PRACTITIONER

## 2024-03-08 PROCEDURE — 99222 1ST HOSP IP/OBS MODERATE 55: CPT | Performed by: PHYSICIAN ASSISTANT

## 2024-03-08 PROCEDURE — 36415 COLL VENOUS BLD VENIPUNCTURE: CPT

## 2024-03-08 PROCEDURE — 2500000001 HC RX 250 WO HCPCS SELF ADMINISTERED DRUGS (ALT 637 FOR MEDICARE OP)

## 2024-03-08 PROCEDURE — 2500000004 HC RX 250 GENERAL PHARMACY W/ HCPCS (ALT 636 FOR OP/ED): Performed by: NURSE PRACTITIONER

## 2024-03-08 PROCEDURE — 80048 BASIC METABOLIC PNL TOTAL CA: CPT

## 2024-03-08 PROCEDURE — 85027 COMPLETE CBC AUTOMATED: CPT

## 2024-03-08 PROCEDURE — 99232 SBSQ HOSP IP/OBS MODERATE 35: CPT | Performed by: PHYSICIAN ASSISTANT

## 2024-03-08 PROCEDURE — 2500000002 HC RX 250 W HCPCS SELF ADMINISTERED DRUGS (ALT 637 FOR MEDICARE OP, ALT 636 FOR OP/ED): Performed by: PHYSICIAN ASSISTANT

## 2024-03-08 PROCEDURE — 82947 ASSAY GLUCOSE BLOOD QUANT: CPT

## 2024-03-08 PROCEDURE — 2500000002 HC RX 250 W HCPCS SELF ADMINISTERED DRUGS (ALT 637 FOR MEDICARE OP, ALT 636 FOR OP/ED)

## 2024-03-08 RX ORDER — INSULIN GLARGINE 100 [IU]/ML
10 INJECTION, SOLUTION SUBCUTANEOUS NIGHTLY
Status: DISCONTINUED | OUTPATIENT
Start: 2024-03-08 | End: 2024-03-09

## 2024-03-08 RX ADMIN — DOCUSATE SODIUM 100 MG: 100 CAPSULE, LIQUID FILLED ORAL at 08:45

## 2024-03-08 RX ADMIN — METHOCARBAMOL 1000 MG: 100 INJECTION, SOLUTION INTRAMUSCULAR; INTRAVENOUS at 21:23

## 2024-03-08 RX ADMIN — METFORMIN HYDROCHLORIDE 1500 MG: 1000 TABLET ORAL at 21:23

## 2024-03-08 RX ADMIN — OXYCODONE HYDROCHLORIDE 10 MG: 5 TABLET ORAL at 01:19

## 2024-03-08 RX ADMIN — ASPIRIN 81 MG: 81 TABLET, COATED ORAL at 08:45

## 2024-03-08 RX ADMIN — ACETAMINOPHEN 650 MG: 325 TABLET ORAL at 01:15

## 2024-03-08 RX ADMIN — METHOCARBAMOL 1000 MG: 100 INJECTION, SOLUTION INTRAMUSCULAR; INTRAVENOUS at 05:58

## 2024-03-08 RX ADMIN — METHOCARBAMOL 1000 MG: 100 INJECTION, SOLUTION INTRAMUSCULAR; INTRAVENOUS at 13:52

## 2024-03-08 RX ADMIN — ACETAMINOPHEN 650 MG: 325 TABLET ORAL at 17:46

## 2024-03-08 RX ADMIN — INSULIN LISPRO 2 UNITS: 100 INJECTION, SOLUTION INTRAVENOUS; SUBCUTANEOUS at 21:25

## 2024-03-08 RX ADMIN — OXYCODONE HYDROCHLORIDE 10 MG: 5 TABLET ORAL at 21:24

## 2024-03-08 RX ADMIN — SODIUM CHLORIDE, POTASSIUM CHLORIDE, SODIUM LACTATE AND CALCIUM CHLORIDE 75 ML/HR: 600; 310; 30; 20 INJECTION, SOLUTION INTRAVENOUS at 01:15

## 2024-03-08 RX ADMIN — Medication 1 TABLET: at 08:45

## 2024-03-08 RX ADMIN — KETOROLAC TROMETHAMINE 30 MG: 30 INJECTION INTRAMUSCULAR; INTRAVENOUS at 01:14

## 2024-03-08 RX ADMIN — KETOROLAC TROMETHAMINE 30 MG: 30 INJECTION INTRAMUSCULAR; INTRAVENOUS at 17:46

## 2024-03-08 RX ADMIN — KETOROLAC TROMETHAMINE 30 MG: 30 INJECTION INTRAMUSCULAR; INTRAVENOUS at 06:58

## 2024-03-08 RX ADMIN — ENOXAPARIN SODIUM 40 MG: 100 INJECTION SUBCUTANEOUS at 21:23

## 2024-03-08 RX ADMIN — INSULIN LISPRO 2 UNITS: 100 INJECTION, SOLUTION INTRAVENOUS; SUBCUTANEOUS at 12:44

## 2024-03-08 RX ADMIN — ACETAMINOPHEN 650 MG: 325 TABLET ORAL at 23:50

## 2024-03-08 RX ADMIN — KETOROLAC TROMETHAMINE 30 MG: 30 INJECTION INTRAMUSCULAR; INTRAVENOUS at 23:50

## 2024-03-08 RX ADMIN — ACETAMINOPHEN 650 MG: 325 TABLET ORAL at 12:39

## 2024-03-08 RX ADMIN — ACETAMINOPHEN 650 MG: 325 TABLET ORAL at 06:59

## 2024-03-08 RX ADMIN — ATORVASTATIN CALCIUM 40 MG: 40 TABLET, FILM COATED ORAL at 21:25

## 2024-03-08 RX ADMIN — INSULIN LISPRO 2 UNITS: 100 INJECTION, SOLUTION INTRAVENOUS; SUBCUTANEOUS at 08:49

## 2024-03-08 RX ADMIN — DOCUSATE SODIUM 100 MG: 100 CAPSULE, LIQUID FILLED ORAL at 21:23

## 2024-03-08 RX ADMIN — INSULIN GLARGINE 10 UNITS: 100 INJECTION, SOLUTION SUBCUTANEOUS at 21:25

## 2024-03-08 RX ADMIN — INSULIN LISPRO 2 UNITS: 100 INJECTION, SOLUTION INTRAVENOUS; SUBCUTANEOUS at 17:46

## 2024-03-08 RX ADMIN — GABAPENTIN 300 MG: 300 CAPSULE ORAL at 13:52

## 2024-03-08 RX ADMIN — GABAPENTIN 300 MG: 300 CAPSULE ORAL at 21:24

## 2024-03-08 RX ADMIN — METFORMIN HYDROCHLORIDE 1000 MG: 1000 TABLET ORAL at 08:45

## 2024-03-08 RX ADMIN — KETOROLAC TROMETHAMINE 30 MG: 30 INJECTION INTRAMUSCULAR; INTRAVENOUS at 12:39

## 2024-03-08 RX ADMIN — GABAPENTIN 300 MG: 300 CAPSULE ORAL at 05:59

## 2024-03-08 ASSESSMENT — ENCOUNTER SYMPTOMS
WEAKNESS: 1
FATIGUE: 1
APPETITE CHANGE: 1
MYALGIAS: 1

## 2024-03-08 ASSESSMENT — COGNITIVE AND FUNCTIONAL STATUS - GENERAL
DAILY ACTIVITIY SCORE: 8
MOBILITY SCORE: 6
DRESSING REGULAR UPPER BODY CLOTHING: A LITTLE
STANDING UP FROM CHAIR USING ARMS: TOTAL
DRESSING REGULAR LOWER BODY CLOTHING: TOTAL
WALKING IN HOSPITAL ROOM: TOTAL
MOVING FROM LYING ON BACK TO SITTING ON SIDE OF FLAT BED WITH BEDRAILS: A LOT
CLIMB 3 TO 5 STEPS WITH RAILING: TOTAL
WALKING IN HOSPITAL ROOM: TOTAL
TURNING FROM BACK TO SIDE WHILE IN FLAT BAD: TOTAL
TURNING FROM BACK TO SIDE WHILE IN FLAT BAD: A LOT
DAILY ACTIVITIY SCORE: 15
MOVING TO AND FROM BED TO CHAIR: TOTAL
HELP NEEDED FOR BATHING: A LOT
TOILETING: TOTAL
HELP NEEDED FOR BATHING: TOTAL
PERSONAL GROOMING: A LITTLE
EATING MEALS: A LITTLE
DRESSING REGULAR LOWER BODY CLOTHING: A LOT
TOILETING: A LOT
PERSONAL GROOMING: TOTAL
MOBILITY SCORE: 8
DRESSING REGULAR UPPER BODY CLOTHING: TOTAL
MOVING TO AND FROM BED TO CHAIR: TOTAL
EATING MEALS: A LITTLE
CLIMB 3 TO 5 STEPS WITH RAILING: TOTAL
STANDING UP FROM CHAIR USING ARMS: TOTAL
MOVING FROM LYING ON BACK TO SITTING ON SIDE OF FLAT BED WITH BEDRAILS: TOTAL

## 2024-03-08 ASSESSMENT — PAIN SCALES - GENERAL
PAINLEVEL_OUTOF10: 7
PAINLEVEL_OUTOF10: 4
PAINLEVEL_OUTOF10: 5 - MODERATE PAIN
PAINLEVEL_OUTOF10: 5 - MODERATE PAIN
PAINLEVEL_OUTOF10: 3
PAINLEVEL_OUTOF10: 5 - MODERATE PAIN

## 2024-03-08 ASSESSMENT — PAIN - FUNCTIONAL ASSESSMENT
PAIN_FUNCTIONAL_ASSESSMENT: 0-10

## 2024-03-08 NOTE — CONSULTS
Inpatient consult to Endocrinology  Consult performed by: Julia Moya PA-C  Consult ordered by: Emily Conroy PA-C  Reason for consult: DM2 with post-operative hyperglycemia  Assessment/Recommendations: Pt would benefit from optimized glucose control to promote surgical site healing          Reason For Consult  DM2 with post-operative hyperglycemia     History Of Present Illness  Jason Hollins is a 63 y.o. male presenting with a past medical history significant for DM II, HPL, HTN, and gluteal myxoid chondrosarcoma s/p radiation. He is now S/P wide resection of gluteal sarcoma, sciatic nerve neurolysis (Per ortho) and right gluteal reconstruction, pedicle ALT, vastus lateralus flap, pedicle gluteal and perisformis flap, pelvic closure, and incisional wound vac placement (Per plastics) on 3/5/24 with Dr. Hawkins and Dr. Smith.    Diabetes History  Outpatient provider for endocrine care PCP, date of last visit >6 months ago  Initial diabetes diagnosis was made (year/age) several years ago  Known complications due to diabetes include: obesity and hyperglycemia    Home Management  Metformin 2500mg daily  Trulicity 4.5mg weekly    Results from Most Recent A1C  Hemoglobin A1C   Date/Time Value Ref Range Status   02/28/2024 08:41 AM 7.5 (H) see below % Final        Diabetes Problem List Entries with Dates  Problem List:  2023-06: Type 2 diabetes mellitus with hyperglycemia (CMS/[facility])  2023-04: Diabetes mellitus type 2 in obese (CMS/[facility])  2015-05: DM type 2 without retinopathy (CMS/[facility])      History of DKA with Dates:  N/a    Past Medical History  He has a past medical history of Diabetes mellitus (CMS/[facility]), Hyperlipidemia, and Morbid (severe) obesity due to excess calories (CMS/[facility]) (05/31/2016).    Surgical History  He has a past surgical history that includes Other surgical history (05/31/2016) and CT guided percutaneous biopsy muscle (11/13/2023).     Social History  He reports that he has never smoked. He has  never used smokeless tobacco. He reports that he does not drink alcohol and does not use drugs.  Pt is a  who specializes in drug/alcohol counseling     Family History  Family History   Problem Relation Name Age of Onset    Coronary artery disease Mother      Diabetes Mother      Hypertension Mother      Cancer Father      Diabetes Sister      Diabetes Brother      Diabetes Other Grandmother         Allergies  Patient has no known allergies.    Review of Systems   Constitutional:  Positive for appetite change and fatigue.   Musculoskeletal:  Positive for myalgias.        Appropriate to procedure   Neurological:  Positive for weakness.   All other systems reviewed and are negative.       Physical Exam  Constitutional:       Appearance: He is obese. He is ill-appearing.   HENT:      Head: Normocephalic and atraumatic.      Nose: Nose normal.      Mouth/Throat:      Mouth: Mucous membranes are moist.      Pharynx: Oropharynx is clear.   Eyes:      Extraocular Movements: Extraocular movements intact.      Conjunctiva/sclera: Conjunctivae normal.   Cardiovascular:      Rate and Rhythm: Normal rate and regular rhythm.      Pulses: Normal pulses.      Heart sounds: Normal heart sounds.   Pulmonary:      Effort: Pulmonary effort is normal.      Breath sounds: Normal breath sounds.   Abdominal:      General: Abdomen is flat. Bowel sounds are normal.      Palpations: Abdomen is soft.   Musculoskeletal:      Comments: Appropriate to procedure with multiple ANDERSON drains in place surrounding surgical site   Skin:     General: Skin is warm and dry.   Neurological:      General: No focal deficit present.      Mental Status: He is alert and oriented to person, place, and time. Mental status is at baseline.   Psychiatric:         Mood and Affect: Mood normal.         Behavior: Behavior normal.         Thought Content: Thought content normal.         Judgment: Judgment normal.          ROS, PMH, FH/SH, surgical history and  "allergies have been reviewed.    Last Recorded Vitals  Blood pressure 139/70, pulse 87, temperature 37 °C (98.6 °F), resp. rate 18, height 1.778 m (5' 10\"), weight 101 kg (222 lb 14.2 oz), SpO2 96 %.    Relevant Results  Results from last 7 days   Lab Units 03/08/24  1242 03/08/24  0848 03/08/24  0812 03/08/24  0659 03/07/24  2122 03/07/24  1650 03/07/24  0851 03/07/24  0548 03/06/24  0820 03/06/24  0605 03/05/24  2214 03/05/24  1915   POCT GLUCOSE mg/dL 161* 191* 187*  --  220* 180*   < >  --    < >  --    < >  --    GLUCOSE mg/dL  --   --   --  169*  --   --   --  202*  --  216*  --  250*    < > = values in this interval not displayed.          Assessment/Plan   Principal Problem:    Soft tissue sarcoma (CMS/HCC)  Active Problems:    Extraskeletal myxoid chondrosarcoma (CMS/HCC)    DM2 with post-operative hyperglycemia      History Of Present Illness  Jason Hollins is a 63 y.o. male presenting with a past medical history significant for DM II, HPL, HTN, and gluteal myxoid chondrosarcoma s/p radiation. He is now S/P wide resection of gluteal sarcoma, sciatic nerve neurolysis (Per ortho) and right gluteal reconstruction, pedicle ALT, vastus lateralus flap, pedicle gluteal and perisformis flap, pelvic closure, and incisional wound vac placement (Per plastics) on 3/5/24 with Dr. Hawkins and Dr. Smith.     Diabetes History  Outpatient provider for endocrine care PCP, date of last visit >6 months ago  Initial diabetes diagnosis was made (year/age) several years ago  Known complications due to diabetes include: obesity and hyperglycemia     Home Management  Metformin 2500mg daily  Trulicity 4.5mg weekly           Hemoglobin A1C   Date/Time Value Ref Range Status   02/28/2024 08:41 AM 7.5 (H) see below % Final       PLAN  - Goal BG <180  - start glargine 10u qHS  - continue lispro corrective scale #2 with meals   = 0u  151-200 = 2u  201-250 = 4u  251-300 = 6u  301-350 = 8u  351-400 = 10u  - continue metformin 1000mg AM " and 1500mg PM    -Accuchecks (not BMP) TIDAC and QHS- kindly ensure QHS Accucheck is drawn; it is often missed   -Hypoglycemia protocol  -Diabetes Diet- low carb 60 CHO  -Will continue to follow and titrate insulin accordingly    Dispo: pt can expect to discharge on metformin, home trulicity 4.5mg, and likely start of new oral anti-diabetic medication. So long as daily insulin requirements remain <20u, we can expect to discharge without insulin.     I spent 60 minutes in the professional and overall care of this patient.      MALLY CordovaC

## 2024-03-08 NOTE — PROGRESS NOTES
Department of Plastic and Reconstructive Surgery  Daily Progress Note    Patient Name: Jason Hollins  MRN: [id number]  Date:  03/08/24     Subjective  Resting in bed in left lateral decubitus position. Pain well controlled. Tolerating diet and endorses adequate fluid intake. +flatus and a large BM yesterday. Endorses IS use. No complaints at this time. Denies fevers or chills. Denies chest pain, SOB, abd pain, n/v/d.    Glucose remains elevated despite SSI (~180-220 overnight). Pt states he does not check his glucose very frequently at home but when he does it's typically in the 180s. Has not seen his PCP in over 6 months for diabetic management.     Objective  Physical Exam   Constitutional: NAD  Eyes: EOMI, clear sclera  Head/Neck: NCAT  Cardiovascular: Tachycardic rate  Respiratory/Thorax: Unlabored respirations on RA  Gastrointestinal: Abdomen soft, ND/NT.   Musculoskeletal: NWB RLE. +SILT BLE. Intact RLE DP pulses. Compartments soft and compressible.   Neurological: A&Ox3  Psychological: Appropriate mood and behavior  Skin: Warm and dry, no rashes.  Extremity: Pedicle flap to right gluteal region appropriate in color. Pedicle flap warm to touch, soft and compressible with no discoloration. Moderate seroma about the anterolateral leg incisional vac, soft and compression. Incisional wound vac intact maintaining suction without obstruction or air leak to right gluteal/thigh region. ANDERSON x4 (x2 to right lower back, x2 to lateral/medial thigh) draining moderate bloody serous drainage. Area appropriately TTP. Maintains lateral lying on left side, with leg flexed no greater than 20 degrees.     Assessment  Jason Hollins is a 63 y.o. male with a past medical history significant for DM II, HPL, HTN, and gluteal myxoid chondrosarcoma s/p radiation. He is now S/P wide resection of gluteal sarcoma, sciatic nerve neurolysis (Per ortho) and right gluteal reconstruction, pedicle ALT, vastus lateralus flap, pedicle gluteal and  perisformis flap, pelvic closure, and incisional wound vac placement (Per plastics) on 3/5/24 with Dr. Hawkins and Dr. Smith.    Plan/Recommendations  S/p Right gluteal reconstruction, pedicle ALT, vastus lateralus flap, pedicle gluteal and perisformis flap with incisional wound vac placement.  - Serial flap assessments Q4hour per nursing with interval checks per plastics       ·  Assess flap appearance (color, warmth, turgor) over windowed area. No doppler assessment required       ·  Nursing to notify plastic surgery team immediately if there are any acute changes          (Including pallor, temperature change, bleeding, etc.)  - Maintain incisional wound vac to right gluteal/lateral thigh region 10-14 days        ·  Settings: -125mmHg low continuous suction        ·  Dressing to remain in place for 10/14 days       ·  Monitor/record vac canister output [id number]       ·  Notify plastic surgery with any concerns of leak or obstruction       ·  Plan to transition to Prevena homegoing vac on day of discharge    - Continue ANDERSON drain care per nursing (ANDERSON x4- x2 lower back, x2 lateral/medial thigh)     ·  Strip drain tubing TID and PRN     ·  Monitor and record output q8h      ·  Keep drain sites C/D/I with daily drain dressing changes      ·  Removal per plastic surgery team when output <30 cc/24 hrs for 2 consecutive days   - Maintain left lateral decubitus positioning to avoid pressure to flap region/incisions  - OK to lay flat to use bedpan   - Avoid flexion of knee >20 degrees. Maintain RLE in soft knee immobilizer   - NWB RLE per plastics   - Strict bedrest x10 days post-operatively   - x3 doses periop Ancef - completed  - Monitor VS/pulse ox [id number]   - Monitor AM CBC/RFP/Mg PRN   - Marino removed POD1- purewick applied to aid in voiding and prevent contamination    # Acute post op pain  - Oxy 5mg (Q4 PRN mod pain), Oxy 10mg (Q4 PRN severe pain)  - Dilaudid 0.2 mg Q4 breakthrough pain  - Robaxin 1000mg Q8 hours  scheduled  - Acetaminophen 650mg Q6 hours scheduled  - Gabapentin 300mg Q8 scheduled  - IV Toradol 30mg Q6 added   - Zofran 4mg PRN for opoid associated nausea  - Benadryl 25mg PRN for itching    # DM Type II  - Persistently elevated glucose overnight   - Diabetic diet, glucerna supplemental shakes  - Continue home metformin POD1 (1,000mg AM, 1,500mg PM)  - AC/HS SSI  - Hypoglycemic protocol in place  - Pt has not seen his PCP for diabetic mgmt in over 6 months; checks blood sugar occasionally at home, typically runs in the 180s   - Consult to inpatient endocrinology for inpatient diabetic management and possible adjustments to home diabetic regimen     # Hyperlipidemia   - Atorvastatin 40mg daily ordered    # HTN  - BP stable, does not take any home medications    #FEN/GI  - Encouraged oral intake  - Monitor and replete lytes PRN  - Regular diet   - Bowel regimen: Colace 100mg BID and Milk of Mag PRN    #Tachycardia  - Stabilized with 500ml LR bolus and maintenance fluids   - Continue IVF until adequate PO intake  - Purewick applied to aid with voiding and prevent contamination  - Strict monitoring I/Os    #Low grade fever  - Tmax 100.8 overnight 3/7  - CXR no acute findings  - No further fevers   - Encouraged IS   - Will continue to monitor    #DVT Prophylaxis  - SQ Lovenox   - Resume home ASA 81mg Aspirin   - SCD    - Encouraged use of IS (x10 every hour while awake)    Disposition: Continue care on RNF. Will remain inpatient for continued flap monitoring and vac/drain surveillance postoperatively. Anticipate discontinuation of bedrest on POD#10. Discharge plans pending PT/OT assessment at that time.     Patient and plan discussed with Dr. Tata PA-C   Plastic and Reconstructive Surgery   Available via [employer], pager: [phone] or Team phones: [phone]

## 2024-03-08 NOTE — CARE PLAN
Problem: Diabetes  Goal: Achieve decreasing blood glucose levels by end of shift  Outcome: Progressing  Goal: Increase stability of blood glucose readings by end of shift  Outcome: Progressing  Goal: Decrease in ketones present in urine by end of shift  Outcome: Progressing  Goal: Maintain electrolyte levels within acceptable range throughout shift  Outcome: Progressing  Goal: Maintain glucose levels >70mg/dl to <250mg/dl throughout shift  Outcome: Progressing  Goal: No changes in neurological exam by end of shift  Outcome: Progressing  Goal: Learn about and adhere to nutrition recommendations by end of shift  Outcome: Progressing  Goal: Vital signs within normal range for age by end of shift  Outcome: Progressing  Goal: Increase self care and/or family involovement by end of shift  Outcome: Progressing  Goal: Receive DSME education by end of shift  Outcome: Progressing   The patient's goals for the shift include      The clinical goals for the shift include patient will have adequate pain control

## 2024-03-09 LAB
ANION GAP SERPL CALC-SCNC: 15 MMOL/L (ref 10–20)
BUN SERPL-MCNC: 15 MG/DL (ref 6–23)
CALCIUM SERPL-MCNC: 8.2 MG/DL (ref 8.6–10.6)
CHLORIDE SERPL-SCNC: 108 MMOL/L (ref 98–107)
CO2 SERPL-SCNC: 22 MMOL/L (ref 21–32)
CREAT SERPL-MCNC: 0.63 MG/DL (ref 0.5–1.3)
EGFRCR SERPLBLD CKD-EPI 2021: >90 ML/MIN/1.73M*2
ERYTHROCYTE [DISTWIDTH] IN BLOOD BY AUTOMATED COUNT: 13.2 % (ref 11.5–14.5)
GLUCOSE BLD MANUAL STRIP-MCNC: 199 MG/DL (ref 74–99)
GLUCOSE BLD MANUAL STRIP-MCNC: 204 MG/DL (ref 74–99)
GLUCOSE BLD MANUAL STRIP-MCNC: 229 MG/DL (ref 74–99)
GLUCOSE BLD MANUAL STRIP-MCNC: 239 MG/DL (ref 74–99)
GLUCOSE SERPL-MCNC: 190 MG/DL (ref 74–99)
HCT VFR BLD AUTO: 31.7 % (ref 41–52)
HGB BLD-MCNC: 10.6 G/DL (ref 13.5–17.5)
MAGNESIUM SERPL-MCNC: 1.69 MG/DL (ref 1.6–2.4)
MCH RBC QN AUTO: 28.6 PG (ref 26–34)
MCHC RBC AUTO-ENTMCNC: 33.4 G/DL (ref 32–36)
MCV RBC AUTO: 85 FL (ref 80–100)
NRBC BLD-RTO: 0 /100 WBCS (ref 0–0)
PLATELET # BLD AUTO: 165 X10*3/UL (ref 150–450)
POTASSIUM SERPL-SCNC: 3.9 MMOL/L (ref 3.5–5.3)
RBC # BLD AUTO: 3.71 X10*6/UL (ref 4.5–5.9)
SODIUM SERPL-SCNC: 141 MMOL/L (ref 136–145)
WBC # BLD AUTO: 4.7 X10*3/UL (ref 4.4–11.3)

## 2024-03-09 PROCEDURE — 2500000001 HC RX 250 WO HCPCS SELF ADMINISTERED DRUGS (ALT 637 FOR MEDICARE OP)

## 2024-03-09 PROCEDURE — 36415 COLL VENOUS BLD VENIPUNCTURE: CPT

## 2024-03-09 PROCEDURE — 82947 ASSAY GLUCOSE BLOOD QUANT: CPT

## 2024-03-09 PROCEDURE — 80048 BASIC METABOLIC PNL TOTAL CA: CPT

## 2024-03-09 PROCEDURE — 99024 POSTOP FOLLOW-UP VISIT: CPT

## 2024-03-09 PROCEDURE — 85027 COMPLETE CBC AUTOMATED: CPT

## 2024-03-09 PROCEDURE — 2500000004 HC RX 250 GENERAL PHARMACY W/ HCPCS (ALT 636 FOR OP/ED): Performed by: NURSE PRACTITIONER

## 2024-03-09 PROCEDURE — 2500000002 HC RX 250 W HCPCS SELF ADMINISTERED DRUGS (ALT 637 FOR MEDICARE OP, ALT 636 FOR OP/ED)

## 2024-03-09 PROCEDURE — 99232 SBSQ HOSP IP/OBS MODERATE 35: CPT

## 2024-03-09 PROCEDURE — 83735 ASSAY OF MAGNESIUM: CPT

## 2024-03-09 PROCEDURE — 2500000001 HC RX 250 WO HCPCS SELF ADMINISTERED DRUGS (ALT 637 FOR MEDICARE OP): Performed by: NURSE PRACTITIONER

## 2024-03-09 PROCEDURE — 1170000001 HC PRIVATE ONCOLOGY ROOM DAILY

## 2024-03-09 RX ORDER — INSULIN GLARGINE 100 [IU]/ML
12 INJECTION, SOLUTION SUBCUTANEOUS NIGHTLY
Status: DISCONTINUED | OUTPATIENT
Start: 2024-03-09 | End: 2024-03-20

## 2024-03-09 RX ADMIN — SODIUM CHLORIDE, POTASSIUM CHLORIDE, SODIUM LACTATE AND CALCIUM CHLORIDE 75 ML/HR: 600; 310; 30; 20 INJECTION, SOLUTION INTRAVENOUS at 00:34

## 2024-03-09 RX ADMIN — METHOCARBAMOL 1000 MG: 100 INJECTION, SOLUTION INTRAMUSCULAR; INTRAVENOUS at 06:23

## 2024-03-09 RX ADMIN — ACETAMINOPHEN 650 MG: 325 TABLET ORAL at 12:46

## 2024-03-09 RX ADMIN — ENOXAPARIN SODIUM 40 MG: 100 INJECTION SUBCUTANEOUS at 21:37

## 2024-03-09 RX ADMIN — KETOROLAC TROMETHAMINE 30 MG: 30 INJECTION INTRAMUSCULAR; INTRAVENOUS at 18:36

## 2024-03-09 RX ADMIN — Medication 1 TABLET: at 08:58

## 2024-03-09 RX ADMIN — INSULIN LISPRO 4 UNITS: 100 INJECTION, SOLUTION INTRAVENOUS; SUBCUTANEOUS at 11:00

## 2024-03-09 RX ADMIN — GABAPENTIN 300 MG: 300 CAPSULE ORAL at 21:38

## 2024-03-09 RX ADMIN — INSULIN LISPRO 4 UNITS: 100 INJECTION, SOLUTION INTRAVENOUS; SUBCUTANEOUS at 18:36

## 2024-03-09 RX ADMIN — KETOROLAC TROMETHAMINE 30 MG: 30 INJECTION INTRAMUSCULAR; INTRAVENOUS at 06:23

## 2024-03-09 RX ADMIN — ASPIRIN 81 MG: 81 TABLET, COATED ORAL at 08:58

## 2024-03-09 RX ADMIN — INSULIN LISPRO 4 UNITS: 100 INJECTION, SOLUTION INTRAVENOUS; SUBCUTANEOUS at 21:57

## 2024-03-09 RX ADMIN — ACETAMINOPHEN 650 MG: 325 TABLET ORAL at 06:23

## 2024-03-09 RX ADMIN — ACETAMINOPHEN 650 MG: 325 TABLET ORAL at 18:36

## 2024-03-09 RX ADMIN — GABAPENTIN 300 MG: 300 CAPSULE ORAL at 13:12

## 2024-03-09 RX ADMIN — METHOCARBAMOL 1000 MG: 100 INJECTION, SOLUTION INTRAMUSCULAR; INTRAVENOUS at 21:38

## 2024-03-09 RX ADMIN — METFORMIN HYDROCHLORIDE 1500 MG: 1000 TABLET ORAL at 21:38

## 2024-03-09 RX ADMIN — INSULIN GLARGINE 12 UNITS: 100 INJECTION, SOLUTION SUBCUTANEOUS at 21:58

## 2024-03-09 RX ADMIN — INSULIN LISPRO 2 UNITS: 100 INJECTION, SOLUTION INTRAVENOUS; SUBCUTANEOUS at 08:58

## 2024-03-09 RX ADMIN — METHOCARBAMOL 1000 MG: 100 INJECTION, SOLUTION INTRAMUSCULAR; INTRAVENOUS at 13:12

## 2024-03-09 RX ADMIN — ATORVASTATIN CALCIUM 40 MG: 40 TABLET, FILM COATED ORAL at 21:37

## 2024-03-09 RX ADMIN — OXYCODONE HYDROCHLORIDE 10 MG: 5 TABLET ORAL at 22:03

## 2024-03-09 RX ADMIN — METFORMIN HYDROCHLORIDE 1000 MG: 1000 TABLET ORAL at 08:58

## 2024-03-09 RX ADMIN — DOCUSATE SODIUM 100 MG: 100 CAPSULE, LIQUID FILLED ORAL at 08:58

## 2024-03-09 RX ADMIN — GABAPENTIN 300 MG: 300 CAPSULE ORAL at 06:23

## 2024-03-09 RX ADMIN — ACETAMINOPHEN 650 MG: 325 TABLET ORAL at 23:44

## 2024-03-09 RX ADMIN — DOCUSATE SODIUM 100 MG: 100 CAPSULE, LIQUID FILLED ORAL at 21:37

## 2024-03-09 RX ADMIN — KETOROLAC TROMETHAMINE 30 MG: 30 INJECTION INTRAMUSCULAR; INTRAVENOUS at 12:47

## 2024-03-09 ASSESSMENT — ENCOUNTER SYMPTOMS
POLYDIPSIA: 0
UNEXPECTED WEIGHT CHANGE: 0
ACTIVITY CHANGE: 0
CHEST TIGHTNESS: 0
TROUBLE SWALLOWING: 0
DIAPHORESIS: 0
SHORTNESS OF BREATH: 0
POLYPHAGIA: 0
APPETITE CHANGE: 1
ABDOMINAL PAIN: 0
LIGHT-HEADEDNESS: 0
PALPITATIONS: 0
EYE PAIN: 0
DYSURIA: 0
BRUISES/BLEEDS EASILY: 0
VOMITING: 0
AGITATION: 0
JOINT SWELLING: 0
EYE REDNESS: 0
SORE THROAT: 0
DIARRHEA: 0
NUMBNESS: 0
HEADACHES: 0
COUGH: 0
FREQUENCY: 0
WEAKNESS: 1
DIZZINESS: 0
CONFUSION: 0
FATIGUE: 1

## 2024-03-09 ASSESSMENT — COGNITIVE AND FUNCTIONAL STATUS - GENERAL
CLIMB 3 TO 5 STEPS WITH RAILING: TOTAL
DAILY ACTIVITIY SCORE: 15
MOVING TO AND FROM BED TO CHAIR: TOTAL
TURNING FROM BACK TO SIDE WHILE IN FLAT BAD: TOTAL
MOVING TO AND FROM BED TO CHAIR: TOTAL
DRESSING REGULAR UPPER BODY CLOTHING: A LOT
WALKING IN HOSPITAL ROOM: TOTAL
PERSONAL GROOMING: A LITTLE
MOBILITY SCORE: 9
WALKING IN HOSPITAL ROOM: TOTAL
PERSONAL GROOMING: A LITTLE
DRESSING REGULAR LOWER BODY CLOTHING: TOTAL
CLIMB 3 TO 5 STEPS WITH RAILING: TOTAL
HELP NEEDED FOR BATHING: A LOT
DRESSING REGULAR LOWER BODY CLOTHING: A LOT
TOILETING: A LOT
HELP NEEDED FOR BATHING: A LOT
EATING MEALS: A LITTLE
TURNING FROM BACK TO SIDE WHILE IN FLAT BAD: A LOT
MOVING FROM LYING ON BACK TO SITTING ON SIDE OF FLAT BED WITH BEDRAILS: A LOT
MOVING FROM LYING ON BACK TO SITTING ON SIDE OF FLAT BED WITH BEDRAILS: A LITTLE
MOBILITY SCORE: 7
TOILETING: A LOT
STANDING UP FROM CHAIR USING ARMS: TOTAL
DRESSING REGULAR UPPER BODY CLOTHING: A LOT
STANDING UP FROM CHAIR USING ARMS: TOTAL
DAILY ACTIVITIY SCORE: 13

## 2024-03-09 ASSESSMENT — PAIN SCALES - GENERAL
PAINLEVEL_OUTOF10: 5 - MODERATE PAIN
PAINLEVEL_OUTOF10: 4
PAINLEVEL_OUTOF10: 7
PAINLEVEL_OUTOF10: 5 - MODERATE PAIN

## 2024-03-09 ASSESSMENT — PAIN - FUNCTIONAL ASSESSMENT
PAIN_FUNCTIONAL_ASSESSMENT: 0-10

## 2024-03-09 NOTE — PROGRESS NOTES
Department of Plastic and Reconstructive Surgery  Daily Progress Note    Patient Name: Jason Hollins  MRN: [id number]  Date:  03/09/24     Subjective  Resting in bed in left lateral decubitus position. Pain well controlled. Tolerating diet and endorses adequate fluid intake. Denies fevers or chills. Denies chest pain, SOB, abd pain, n/v/d.    Objective  Physical Exam   Constitutional: NAD  Eyes: EOMI, clear sclera  Head/Neck: NCAT  Cardiovascular: RRR  Respiratory/Thorax: Unlabored respirations on RA  Gastrointestinal: Abdomen soft, ND/NT.   Musculoskeletal: NWB RLE. +SILT BLE. Intact RLE DP pulses. Compartments soft and compressible.   Neurological: A&Ox3  Psychological: Appropriate mood and behavior  Skin: Warm and dry, no rashes.  Extremity: Pedicle flap to right gluteal region appropriate in color. Pedicle flap warm to touch, soft and compressible with no discoloration. Incisional wound vac intact maintaining suction without obstruction or air leak to right gluteal/thigh region. ANDERSON x4 (x2 to right lower back, x2 to lateral/medial thigh) draining moderate bloody serous drainage. Area appropriately TTP. Maintains lateral lying on left side, with leg flexed no greater than 20 degrees.     Assessment  Jason Hollins is a 63 y.o. male with a past medical history significant for DM II, HPL, HTN, and gluteal myxoid chondrosarcoma s/p radiation. He is now S/P wide resection of gluteal sarcoma, sciatic nerve neurolysis (Per ortho) and right gluteal reconstruction, pedicle ALT, vastus lateralus flap, pedicle gluteal and perisformis flap, pelvic closure, and incisional wound vac placement (Per plastics) on 3/5/24 with Dr. Hawkins and Dr. Smith.    Plan/Recommendations  S/p Right gluteal reconstruction, pedicle ALT, vastus lateralus flap, pedicle gluteal and perisformis flap with incisional wound vac placement.  - Serial flap assessments Q4hour per nursing with interval checks per plastics       ·  Assess flap appearance (color,  warmth, turgor) over windowed area. No doppler assessment required       ·  Nursing to notify plastic surgery team immediately if there are any acute changes          (Including pallor, temperature change, bleeding, etc.)  - Maintain incisional wound vac to right gluteal/lateral thigh region 10-14 days        ·  Settings: -125mmHg low continuous suction        ·  Dressing to remain in place for 10/14 days       ·  Monitor/record vac canister output [id number]       ·  Notify plastic surgery with any concerns of leak or obstruction       ·  Plan to transition to Prevena homegoing vac on day of discharge    - Continue ANDERSON drain care per nursing (ANDERSON x4- x2 lower back, x2 lateral/medial thigh)     ·  Strip drain tubing TID and PRN     ·  Monitor and record output q8h      ·  Keep drain sites C/D/I with daily drain dressing changes      ·  Removal per plastic surgery team when output <30 cc/24 hrs for 2 consecutive days   - Maintain left lateral decubitus positioning to avoid pressure to flap region/incisions  - OK to lay flat to use bedpan   - Avoid flexion of knee >20 degrees. Maintain RLE in soft knee immobilizer   - NWB RLE per plastics   - Strict bedrest x10 days post-operatively   - x3 doses periop Ancef - completed  - Monitor VS/pulse ox [id number]   - Monitor AM CBC/RFP/Mg PRN   - Marino removed POD1- purewick applied to aid in voiding and prevent contamination    # Acute post op pain  - Oxy 5mg (Q4 PRN mod pain), Oxy 10mg (Q4 PRN severe pain)  - Dilaudid 0.2 mg Q4 breakthrough pain  - Robaxin 1000mg Q8 hours scheduled  - Acetaminophen 650mg Q6 hours scheduled  - Gabapentin 300mg Q8 scheduled  - IV Toradol 30mg Q6 added   - Zofran 4mg PRN for opoid associated nausea  - Benadryl 25mg PRN for itching    # DM Type II  - Persistently elevated glucose   - Diabetic diet, glucerna supplemental shakes  - Continue home metformin POD1 (1,000mg AM, 1,500mg PM)  - AC/HS SSI  - Hypoglycemic protocol in place  - Pt has not seen his  PCP for diabetic mgmt in over 6 months; checks blood sugar occasionally at home, typically runs in the 180s   - Consult to inpatient endocrinology for inpatient diabetic management and possible adjustments to home diabetic regimen    -> Glargine 10units nightly     # Hyperlipidemia   - Atorvastatin 40mg daily ordered    # HTN  - BP stable, does not take any home medications    #FEN/GI  - Encouraged oral intake  - Monitor and replete lytes PRN  - Regular diet   - Bowel regimen: Colace 100mg BID and Milk of Mag PRN    #Tachycardia  - Stabilized with 500ml LR bolus and maintenance fluids   - Continue IVF until adequate PO intake  - Purewick applied to aid with voiding and prevent contamination  - Strict monitoring I/Os    #Low grade fever  - Tmax 100.8 overnight 3/7  - CXR no acute findings  - No further fevers   - Encouraged IS   - Will continue to monitor    #DVT Prophylaxis  - SQ Lovenox   - Resume home ASA 81mg Aspirin   - SCD    - Encouraged use of IS (x10 every hour while awake)    Disposition: Continue care on RNF. Will remain inpatient for continued flap monitoring and vac/drain surveillance postoperatively. Anticipate discontinuation of bedrest on POD#10. Discharge plans pending PT/OT assessment at that time.     Patient and plan discussed with Dr. Stevens, PA-C  Plastic and Reconstructive Surgery  Epic chat, Pager [phone], Team phones: [phone]

## 2024-03-09 NOTE — CARE PLAN
The patient's goals for the shift include  pt will have adequate pain control    The clinical goals for the shift include patient will have adequate pain control and remain HDS with positive flap health

## 2024-03-09 NOTE — PROGRESS NOTES
Jason Hollins is a 63 y.o. male on day 4 of admission presenting with Soft tissue sarcoma (CMS/HCC).    Subjective   Pt seen and examined  We discussed optimizing insulin regimen and glucose control to promote wound healing  Pt has glucerna and fair life shakes, we discussed fair life shakes actually have less carbohydrates and would be better for pt glucose  Discussed lantus insulin  All questions answered      I have reviewed histories, allergies and medications have been reviewed and there are no changes       Objective   Review of Systems   Constitutional:  Positive for appetite change and fatigue. Negative for activity change, diaphoresis and unexpected weight change.   HENT:  Negative for congestion, sore throat and trouble swallowing.    Eyes:  Negative for pain, redness and visual disturbance.   Respiratory:  Negative for cough, chest tightness and shortness of breath.    Cardiovascular:  Negative for chest pain, palpitations and leg swelling.   Gastrointestinal:  Negative for abdominal pain, diarrhea and vomiting.   Endocrine: Negative for cold intolerance, heat intolerance, polydipsia, polyphagia and polyuria.   Genitourinary:  Negative for dysuria, frequency and urgency.   Musculoskeletal:  Negative for gait problem and joint swelling.   Skin:  Negative for pallor and rash.   Allergic/Immunologic: Negative for immunocompromised state.   Neurological:  Positive for weakness. Negative for dizziness, light-headedness, numbness and headaches.   Hematological:  Does not bruise/bleed easily.   Psychiatric/Behavioral:  Negative for agitation, behavioral problems and confusion.    All other systems reviewed and are negative.    Physical Exam  Vitals reviewed.   Constitutional:       General: He is not in acute distress.     Appearance: Normal appearance. He is obese.   HENT:      Head: Normocephalic and atraumatic.      Nose: Nose normal.      Mouth/Throat:      Mouth: Mucous membranes are moist.   Eyes:       "Extraocular Movements: Extraocular movements intact.      Conjunctiva/sclera: Conjunctivae normal.      Pupils: Pupils are equal, round, and reactive to light.   Cardiovascular:      Pulses: Normal pulses.   Pulmonary:      Effort: Pulmonary effort is normal. No respiratory distress.   Abdominal:      General: Abdomen is flat. There is no distension.   Musculoskeletal:         General: Normal range of motion.      Comments:  Appropriate to procedure with multiple ANDERSON drains in place surrounding surgical site    Skin:     General: Skin is warm and dry.      Findings: No rash.   Neurological:      Mental Status: He is alert and oriented to person, place, and time.   Psychiatric:         Mood and Affect: Mood normal.         Behavior: Behavior normal.         Last Recorded Vitals  Blood pressure 135/78, pulse 78, temperature 36.4 °C (97.5 °F), resp. rate 18, height 1.778 m (5' 10\"), weight 101 kg (222 lb 14.2 oz), SpO2 99 %.  Intake/Output last 3 Shifts:  I/O last 3 completed shifts:  In: 2289.5 (22.6 mL/kg) [P.O.:640; I.V.:1649.5 (16.3 mL/kg)]  Out: 1067.5 (10.6 mL/kg) [Urine:1000 (0.3 mL/kg/hr); Drains:67.5]  Weight: 101.1 kg     Relevant Results  Results from last 7 days   Lab Units 03/09/24  0806 03/09/24  0655 03/08/24  2120 03/08/24  1700 03/08/24  1242 03/08/24  0848 03/08/24  0812 03/08/24  0659 03/07/24  0851 03/07/24  0548 03/06/24  0820 03/06/24  0605 03/05/24  2214 03/05/24  1915   POCT GLUCOSE mg/dL 199*  --  200* 156* 161* 191*   < >  --    < >  --    < >  --    < >  --    GLUCOSE mg/dL  --  190*  --   --   --   --   --  169*  --  202*  --  216*  --  250*    < > = values in this interval not displayed.     Lab Review  Lab Results   Component Value Date    BILITOT 0.8 02/13/2024    CALCIUM 8.2 (L) 03/09/2024    CO2 22 03/09/2024     (H) 03/09/2024    CREATININE 0.63 03/09/2024    GLUCOSE 190 (H) 03/09/2024    ALKPHOS 79 02/13/2024    K 3.9 03/09/2024    PROT 6.9 02/13/2024     03/09/2024    AST " 25 02/13/2024    ALT 37 02/13/2024    BUN 15 03/09/2024    ANIONGAP 15 03/09/2024    MG 1.69 03/09/2024    PHOS 3.3 03/06/2024    GGT 21 02/13/2024     02/13/2024    ALBUMIN 3.5 03/06/2024    GFRMALE >90 09/01/2023     Lab Results   Component Value Date    TRIG 90 12/30/2022    CHOL 89 12/30/2022    HDL 31.5 (A) 12/30/2022     Lab Results   Component Value Date    HGBA1C 7.5 (H) 02/28/2024    HGBA1C 7.1 (H) 11/28/2023    HGBA1C 7.7 (A) 09/01/2023     The ASCVD Risk score (Ravi ENAMORADO, et al., 2019) failed to calculate for the following reasons:    The valid total cholesterol range is 130 to 320 mg/dL    Scheduled medications  acetaminophen, 650 mg, oral, q6h LEILA  aspirin, 81 mg, oral, Daily  atorvastatin, 40 mg, oral, Nightly  docusate sodium, 100 mg, oral, BID  enoxaparin, 40 mg, subcutaneous, q24h  gabapentin, 300 mg, oral, q8h LEILA  insulin glargine, 10 Units, subcutaneous, Nightly  insulin lispro, 0-10 Units, subcutaneous, Before meals & nightly  ketorolac, 30 mg, intravenous, q6h LEILA  metFORMIN, 1,000 mg, oral, Daily with breakfast  metFORMIN, 1,500 mg, oral, Nightly  methocarbamol, 1,000 mg, intravenous, q8h  multivitamin with minerals, 1 tablet, oral, Daily      Continuous medications  lactated Ringer's, 75 mL/hr, Last Rate: 75 mL/hr (03/09/24 0034)      PRN medications  PRN medications: dextrose 10 % in water (D10W), dextrose, diphenhydrAMINE, glucagon, HYDROmorphone, magnesium hydroxide, naloxone, ondansetron **OR** ondansetron, oxyCODONE, oxyCODONE        Assessment/Plan   Principal Problem:    Soft tissue sarcoma (CMS/HCC)  Active Problems:    Extraskeletal myxoid chondrosarcoma (CMS/HCC)    DM2 with post-operative hyperglycemia      History Of Present Illness  Jaosn Hollins is a 63 y.o. male presenting with a past medical history significant for DM II, HPL, HTN, and gluteal myxoid chondrosarcoma s/p radiation. He is now S/P wide resection of gluteal sarcoma, sciatic nerve neurolysis (Per ortho) and right  gluteal reconstruction, pedicle ALT, vastus lateralus flap, pedicle gluteal and perisformis flap, pelvic closure, and incisional wound vac placement (Per plastics) on 3/5/24 with Dr. Hawkins and Dr. Smith.     Diabetes History  Outpatient provider for endocrine care PCP, date of last visit >6 months ago  Initial diabetes diagnosis was made (year/age) several years ago  Known complications due to diabetes include: obesity and hyperglycemia     Home Management  Metformin 2500mg daily  Trulicity 4.5mg weekly        PLAN  - Goal BG <180  - increase glargine 12u qHS  - continue lispro corrective scale #2 with meals   = 0u  151-200 = 2u  201-250 = 4u  251-300 = 6u  301-350 = 8u  351-400 = 10u  - continue metformin 1000mg AM and 1500mg PM     -Accuchecks (not BMP) TIDAC and QHS- kindly ensure QHS Accucheck is drawn; it is often missed   -Hypoglycemia protocol  -Diabetes Diet- low carb 60 CHO  -Will continue to follow and titrate insulin accordingly     Dispo: pt can expect to discharge on metformin, home trulicity 4.5mg, and likely start of new oral anti-diabetic medication. So long as daily insulin requirements remain <20u, we can expect to discharge without insulin.           I spent 35 minutes in the professional and overall care of this patient.      Sheila Clark PA-C

## 2024-03-10 LAB
ANION GAP SERPL CALC-SCNC: 13 MMOL/L (ref 10–20)
BUN SERPL-MCNC: 12 MG/DL (ref 6–23)
CALCIUM SERPL-MCNC: 8.4 MG/DL (ref 8.6–10.6)
CHLORIDE SERPL-SCNC: 109 MMOL/L (ref 98–107)
CO2 SERPL-SCNC: 23 MMOL/L (ref 21–32)
CREAT SERPL-MCNC: 0.53 MG/DL (ref 0.5–1.3)
EGFRCR SERPLBLD CKD-EPI 2021: >90 ML/MIN/1.73M*2
ERYTHROCYTE [DISTWIDTH] IN BLOOD BY AUTOMATED COUNT: 13.2 % (ref 11.5–14.5)
GLUCOSE BLD MANUAL STRIP-MCNC: 156 MG/DL (ref 74–99)
GLUCOSE BLD MANUAL STRIP-MCNC: 166 MG/DL (ref 74–99)
GLUCOSE BLD MANUAL STRIP-MCNC: 167 MG/DL (ref 74–99)
GLUCOSE BLD MANUAL STRIP-MCNC: 169 MG/DL (ref 74–99)
GLUCOSE SERPL-MCNC: 160 MG/DL (ref 74–99)
HCT VFR BLD AUTO: 31.3 % (ref 41–52)
HGB BLD-MCNC: 10.3 G/DL (ref 13.5–17.5)
MCH RBC QN AUTO: 28.3 PG (ref 26–34)
MCHC RBC AUTO-ENTMCNC: 32.9 G/DL (ref 32–36)
MCV RBC AUTO: 86 FL (ref 80–100)
NRBC BLD-RTO: 0 /100 WBCS (ref 0–0)
PLATELET # BLD AUTO: 190 X10*3/UL (ref 150–450)
POTASSIUM SERPL-SCNC: 3.9 MMOL/L (ref 3.5–5.3)
RBC # BLD AUTO: 3.64 X10*6/UL (ref 4.5–5.9)
SODIUM SERPL-SCNC: 141 MMOL/L (ref 136–145)
WBC # BLD AUTO: 4.4 X10*3/UL (ref 4.4–11.3)

## 2024-03-10 PROCEDURE — 2500000001 HC RX 250 WO HCPCS SELF ADMINISTERED DRUGS (ALT 637 FOR MEDICARE OP)

## 2024-03-10 PROCEDURE — 2500000001 HC RX 250 WO HCPCS SELF ADMINISTERED DRUGS (ALT 637 FOR MEDICARE OP): Performed by: NURSE PRACTITIONER

## 2024-03-10 PROCEDURE — 82947 ASSAY GLUCOSE BLOOD QUANT: CPT

## 2024-03-10 PROCEDURE — 99024 POSTOP FOLLOW-UP VISIT: CPT

## 2024-03-10 PROCEDURE — 1170000001 HC PRIVATE ONCOLOGY ROOM DAILY

## 2024-03-10 PROCEDURE — 85027 COMPLETE CBC AUTOMATED: CPT

## 2024-03-10 PROCEDURE — 36415 COLL VENOUS BLD VENIPUNCTURE: CPT

## 2024-03-10 PROCEDURE — 2500000004 HC RX 250 GENERAL PHARMACY W/ HCPCS (ALT 636 FOR OP/ED): Performed by: NURSE PRACTITIONER

## 2024-03-10 PROCEDURE — 2500000002 HC RX 250 W HCPCS SELF ADMINISTERED DRUGS (ALT 637 FOR MEDICARE OP, ALT 636 FOR OP/ED)

## 2024-03-10 PROCEDURE — 99232 SBSQ HOSP IP/OBS MODERATE 35: CPT

## 2024-03-10 PROCEDURE — 80048 BASIC METABOLIC PNL TOTAL CA: CPT

## 2024-03-10 RX ORDER — INSULIN LISPRO 100 [IU]/ML
0-10 INJECTION, SOLUTION INTRAVENOUS; SUBCUTANEOUS
Status: DISCONTINUED | OUTPATIENT
Start: 2024-03-11 | End: 2024-03-20

## 2024-03-10 RX ORDER — INSULIN LISPRO 100 [IU]/ML
4 INJECTION, SOLUTION INTRAVENOUS; SUBCUTANEOUS
Status: DISCONTINUED | OUTPATIENT
Start: 2024-03-10 | End: 2024-03-20

## 2024-03-10 RX ADMIN — METHOCARBAMOL 1000 MG: 100 INJECTION, SOLUTION INTRAMUSCULAR; INTRAVENOUS at 21:29

## 2024-03-10 RX ADMIN — ENOXAPARIN SODIUM 40 MG: 100 INJECTION SUBCUTANEOUS at 21:29

## 2024-03-10 RX ADMIN — Medication 1 TABLET: at 08:32

## 2024-03-10 RX ADMIN — ATORVASTATIN CALCIUM 40 MG: 40 TABLET, FILM COATED ORAL at 21:29

## 2024-03-10 RX ADMIN — INSULIN LISPRO 4 UNITS: 100 INJECTION, SOLUTION INTRAVENOUS; SUBCUTANEOUS at 12:16

## 2024-03-10 RX ADMIN — INSULIN LISPRO 2 UNITS: 100 INJECTION, SOLUTION INTRAVENOUS; SUBCUTANEOUS at 12:18

## 2024-03-10 RX ADMIN — DOCUSATE SODIUM 100 MG: 100 CAPSULE, LIQUID FILLED ORAL at 08:32

## 2024-03-10 RX ADMIN — GABAPENTIN 300 MG: 300 CAPSULE ORAL at 05:42

## 2024-03-10 RX ADMIN — ACETAMINOPHEN 650 MG: 325 TABLET ORAL at 05:42

## 2024-03-10 RX ADMIN — METFORMIN HYDROCHLORIDE 1500 MG: 1000 TABLET ORAL at 21:29

## 2024-03-10 RX ADMIN — ASPIRIN 81 MG: 81 TABLET, COATED ORAL at 08:31

## 2024-03-10 RX ADMIN — ACETAMINOPHEN 650 MG: 325 TABLET ORAL at 17:13

## 2024-03-10 RX ADMIN — INSULIN LISPRO 4 UNITS: 100 INJECTION, SOLUTION INTRAVENOUS; SUBCUTANEOUS at 17:14

## 2024-03-10 RX ADMIN — METHOCARBAMOL 1000 MG: 100 INJECTION, SOLUTION INTRAMUSCULAR; INTRAVENOUS at 05:42

## 2024-03-10 RX ADMIN — INSULIN GLARGINE 12 UNITS: 100 INJECTION, SOLUTION SUBCUTANEOUS at 21:29

## 2024-03-10 RX ADMIN — GABAPENTIN 300 MG: 300 CAPSULE ORAL at 21:29

## 2024-03-10 RX ADMIN — INSULIN LISPRO 2 UNITS: 100 INJECTION, SOLUTION INTRAVENOUS; SUBCUTANEOUS at 17:15

## 2024-03-10 RX ADMIN — INSULIN LISPRO 2 UNITS: 100 INJECTION, SOLUTION INTRAVENOUS; SUBCUTANEOUS at 08:32

## 2024-03-10 RX ADMIN — ACETAMINOPHEN 650 MG: 325 TABLET ORAL at 12:16

## 2024-03-10 RX ADMIN — METFORMIN HYDROCHLORIDE 1000 MG: 1000 TABLET ORAL at 08:32

## 2024-03-10 RX ADMIN — GABAPENTIN 300 MG: 300 CAPSULE ORAL at 13:54

## 2024-03-10 RX ADMIN — METHOCARBAMOL 1000 MG: 100 INJECTION, SOLUTION INTRAMUSCULAR; INTRAVENOUS at 13:54

## 2024-03-10 ASSESSMENT — COGNITIVE AND FUNCTIONAL STATUS - GENERAL
MOBILITY SCORE: 9
DAILY ACTIVITIY SCORE: 15
WALKING IN HOSPITAL ROOM: TOTAL
TOILETING: A LOT
HELP NEEDED FOR BATHING: A LOT
STANDING UP FROM CHAIR USING ARMS: TOTAL
CLIMB 3 TO 5 STEPS WITH RAILING: TOTAL
MOVING TO AND FROM BED TO CHAIR: TOTAL
PERSONAL GROOMING: A LITTLE
TURNING FROM BACK TO SIDE WHILE IN FLAT BAD: A LOT
DRESSING REGULAR LOWER BODY CLOTHING: A LOT
MOVING FROM LYING ON BACK TO SITTING ON SIDE OF FLAT BED WITH BEDRAILS: A LITTLE
DRESSING REGULAR UPPER BODY CLOTHING: A LOT

## 2024-03-10 ASSESSMENT — ENCOUNTER SYMPTOMS
DIZZINESS: 0
DIAPHORESIS: 0
ABDOMINAL PAIN: 0
CHEST TIGHTNESS: 0
DYSURIA: 0
PALPITATIONS: 0
SORE THROAT: 0
JOINT SWELLING: 0
WEAKNESS: 1
FATIGUE: 1
HEADACHES: 0
LIGHT-HEADEDNESS: 0
BRUISES/BLEEDS EASILY: 0
NUMBNESS: 0
COUGH: 0
VOMITING: 0
EYE REDNESS: 0
FREQUENCY: 0
EYE PAIN: 0
UNEXPECTED WEIGHT CHANGE: 0
POLYDIPSIA: 0
SHORTNESS OF BREATH: 0
AGITATION: 0
TROUBLE SWALLOWING: 0
APPETITE CHANGE: 1
ACTIVITY CHANGE: 0
DIARRHEA: 0
POLYPHAGIA: 0
CONFUSION: 0

## 2024-03-10 ASSESSMENT — PAIN - FUNCTIONAL ASSESSMENT
PAIN_FUNCTIONAL_ASSESSMENT: 0-10

## 2024-03-10 ASSESSMENT — PAIN SCALES - GENERAL
PAINLEVEL_OUTOF10: 0 - NO PAIN
PAINLEVEL_OUTOF10: 3
PAINLEVEL_OUTOF10: 3
PAINLEVEL_OUTOF10: 0 - NO PAIN
PAINLEVEL_OUTOF10: 4

## 2024-03-10 NOTE — CARE PLAN
Problem: Diabetes  Goal: Achieve decreasing blood glucose levels by end of shift  Outcome: Progressing  Goal: Increase stability of blood glucose readings by end of shift  Outcome: Progressing  Goal: Decrease in ketones present in urine by end of shift  Outcome: Progressing  Goal: Maintain electrolyte levels within acceptable range throughout shift  Outcome: Progressing  Goal: Maintain glucose levels >70mg/dl to <250mg/dl throughout shift  Outcome: Progressing  Goal: No changes in neurological exam by end of shift  Outcome: Progressing  Goal: Learn about and adhere to nutrition recommendations by end of shift  Outcome: Progressing  Goal: Vital signs within normal range for age by end of shift  Outcome: Progressing  Goal: Increase self care and/or family involovement by end of shift  Outcome: Progressing  Goal: Receive DSME education by end of shift  Outcome: Progressing     Problem: Pain  Goal: Takes deep breaths with improved pain control throughout the shift  Outcome: Progressing  Goal: Turns in bed with improved pain control throughout the shift  Outcome: Progressing  Goal: Walks with improved pain control throughout the shift  Outcome: Progressing  Goal: Performs ADL's with improved pain control throughout shift  Outcome: Progressing  Goal: Participates in PT with improved pain control throughout the shift  Outcome: Progressing  Goal: Free from opioid side effects throughout the shift  Outcome: Progressing  Goal: Free from acute confusion related to pain meds throughout the shift  Outcome: Progressing     Problem: Skin  Goal: Decreased wound size/increased tissue granulation at next dressing change  Outcome: Progressing  Goal: Participates in plan/prevention/treatment measures  Outcome: Progressing  Goal: Prevent/manage excess moisture  Outcome: Progressing  Goal: Prevent/minimize sheer/friction injuries  Outcome: Progressing  Goal: Promote/optimize nutrition  Outcome: Progressing  Goal: Promote skin  healing  Outcome: Progressing

## 2024-03-10 NOTE — PROGRESS NOTES
Jason Hollins is a 63 y.o. male on day 5 of admission presenting with Soft tissue sarcoma (CMS/HCC).    Subjective   Pt seen and examined  We discussed optimizing insulin regimen and glucose control to promote wound healing, will start small dose of lispro with  meals since pt glucose persistently in 200s yesterday  Pt has glucerna and fair life shakes, we discussed fair life shakes actually have less carbohydrates and would be better for pt glucose, pt still not with strong appetite  All questions answered      I have reviewed histories, allergies and medications have been reviewed and there are no changes       Objective   Review of Systems   Constitutional:  Positive for appetite change and fatigue. Negative for activity change, diaphoresis and unexpected weight change.   HENT:  Negative for congestion, sore throat and trouble swallowing.    Eyes:  Negative for pain, redness and visual disturbance.   Respiratory:  Negative for cough, chest tightness and shortness of breath.    Cardiovascular:  Negative for chest pain, palpitations and leg swelling.   Gastrointestinal:  Negative for abdominal pain, diarrhea and vomiting.   Endocrine: Negative for cold intolerance, heat intolerance, polydipsia, polyphagia and polyuria.   Genitourinary:  Negative for dysuria, frequency and urgency.   Musculoskeletal:  Negative for gait problem and joint swelling.   Skin:  Negative for pallor and rash.   Allergic/Immunologic: Negative for immunocompromised state.   Neurological:  Positive for weakness. Negative for dizziness, light-headedness, numbness and headaches.   Hematological:  Does not bruise/bleed easily.   Psychiatric/Behavioral:  Negative for agitation, behavioral problems and confusion.    All other systems reviewed and are negative.    Physical Exam  Vitals reviewed.   Constitutional:       General: He is not in acute distress.     Appearance: Normal appearance. He is obese.   HENT:      Head: Normocephalic and atraumatic.  "     Nose: Nose normal.      Mouth/Throat:      Mouth: Mucous membranes are moist.   Eyes:      Extraocular Movements: Extraocular movements intact.      Conjunctiva/sclera: Conjunctivae normal.      Pupils: Pupils are equal, round, and reactive to light.   Cardiovascular:      Pulses: Normal pulses.   Pulmonary:      Effort: Pulmonary effort is normal. No respiratory distress.   Abdominal:      General: Abdomen is flat. There is no distension.   Musculoskeletal:         General: Normal range of motion.      Comments:  Appropriate to procedure with multiple ANDERSON drains in place surrounding surgical site    Skin:     General: Skin is warm and dry.      Findings: No rash.   Neurological:      Mental Status: He is alert and oriented to person, place, and time.   Psychiatric:         Mood and Affect: Mood normal.         Behavior: Behavior normal.         Last Recorded Vitals  Blood pressure 129/71, pulse 86, temperature 36.1 °C (97 °F), temperature source Temporal, resp. rate 18, height 1.778 m (5' 10\"), weight 101 kg (222 lb 14.2 oz), SpO2 96 %.  Intake/Output last 3 Shifts:  I/O last 3 completed shifts:  In: 4629.3 (45.8 mL/kg) [P.O.:2300; I.V.:2329.3 (23 mL/kg)]  Out: 2114 (20.9 mL/kg) [Urine:2054 (0.6 mL/kg/hr); Drains:60]  Weight: 101.1 kg     Relevant Results  Results from last 7 days   Lab Units 03/10/24  0830 03/09/24  2152 03/09/24  1803 03/09/24  1240 03/09/24  0806 03/09/24  0655 03/08/24  0812 03/08/24  0659 03/07/24  0851 03/07/24  0548 03/06/24  0820 03/06/24  0605   POCT GLUCOSE mg/dL 169* 239* 229* 204* 199*  --    < >  --    < >  --    < >  --    GLUCOSE mg/dL 160*  --   --   --   --  190*  --  169*  --  202*  --  216*    < > = values in this interval not displayed.       Lab Review  Lab Results   Component Value Date    BILITOT 0.8 02/13/2024    CALCIUM 8.4 (L) 03/10/2024    CO2 23 03/10/2024     (H) 03/10/2024    CREATININE 0.53 03/10/2024    GLUCOSE 160 (H) 03/10/2024    ALKPHOS 79 02/13/2024    " K 3.9 03/10/2024    PROT 6.9 02/13/2024     03/10/2024    AST 25 02/13/2024    ALT 37 02/13/2024    BUN 12 03/10/2024    ANIONGAP 13 03/10/2024    MG 1.69 03/09/2024    PHOS 3.3 03/06/2024    GGT 21 02/13/2024     02/13/2024    ALBUMIN 3.5 03/06/2024    GFRMALE >90 09/01/2023     Lab Results   Component Value Date    TRIG 90 12/30/2022    CHOL 89 12/30/2022    HDL 31.5 (A) 12/30/2022     Lab Results   Component Value Date    HGBA1C 7.5 (H) 02/28/2024    HGBA1C 7.1 (H) 11/28/2023    HGBA1C 7.7 (A) 09/01/2023     The ASCVD Risk score (Ravi ENAMORADO, et al., 2019) failed to calculate for the following reasons:    The valid total cholesterol range is 130 to 320 mg/dL    Scheduled medications  acetaminophen, 650 mg, oral, q6h LEILA  aspirin, 81 mg, oral, Daily  atorvastatin, 40 mg, oral, Nightly  docusate sodium, 100 mg, oral, BID  enoxaparin, 40 mg, subcutaneous, q24h  gabapentin, 300 mg, oral, q8h LEILA  insulin glargine, 12 Units, subcutaneous, Nightly  insulin lispro, 0-10 Units, subcutaneous, Before meals & nightly  insulin lispro, 4 Units, subcutaneous, TID with meals  metFORMIN, 1,000 mg, oral, Daily with breakfast  metFORMIN, 1,500 mg, oral, Nightly  methocarbamol, 1,000 mg, intravenous, q8h  multivitamin with minerals, 1 tablet, oral, Daily      Continuous medications       PRN medications  PRN medications: dextrose 10 % in water (D10W), dextrose, diphenhydrAMINE, glucagon, magnesium hydroxide, naloxone, ondansetron **OR** ondansetron, oxyCODONE, oxyCODONE        Assessment/Plan   Principal Problem:    Soft tissue sarcoma (CMS/HCC)  Active Problems:    Extraskeletal myxoid chondrosarcoma (CMS/HCC)    DM2 with post-operative hyperglycemia      History Of Present Illness  Jason Hollins is a 63 y.o. male presenting with a past medical history significant for DM II, HPL, HTN, and gluteal myxoid chondrosarcoma s/p radiation. He is now S/P wide resection of gluteal sarcoma, sciatic nerve neurolysis (Per ortho) and  right gluteal reconstruction, pedicle ALT, vastus lateralus flap, pedicle gluteal and perisformis flap, pelvic closure, and incisional wound vac placement (Per plastics) on 3/5/24 with Dr. Hawkins and Dr. Smith.     Diabetes History  Outpatient provider for endocrine care PCP, date of last visit >6 months ago  Initial diabetes diagnosis was made (year/age) several years ago  Known complications due to diabetes include: obesity and hyperglycemia     Home Management  Metformin 2500mg daily  Trulicity 4.5mg weekly        PLAN  - Goal BG <180  - continue glargine 12u at bedtime  -start lispro 4u with meals  - continue lispro corrective scale #2 with meals   = 0u  151-200 = 2u  201-250 = 4u  251-300 = 6u  301-350 = 8u  351-400 = 10u  - continue metformin 1000mg AM and 1500mg PM     -Accuchecks (not BMP) TIDAC and QHS- kindly ensure QHS Accucheck is drawn; it is often missed   -Hypoglycemia protocol  -Diabetes Diet- low carb 60 CHO  -Will continue to follow and titrate insulin accordingly     Dispo: pt can expect to discharge on metformin, home trulicity 4.5mg, and likely start of new oral anti-diabetic medication. So long as daily insulin requirements remain <20u, we can expect to discharge without insulin.           I spent 35 minutes in the professional and overall care of this patient.      Sheila Clark PA-C

## 2024-03-10 NOTE — CARE PLAN
Problem: Diabetes  Goal: Achieve decreasing blood glucose levels by end of shift  Outcome: Progressing  Goal: Increase stability of blood glucose readings by end of shift  Outcome: Progressing  Goal: Decrease in ketones present in urine by end of shift  Outcome: Progressing  Goal: Maintain electrolyte levels within acceptable range throughout shift  Outcome: Progressing  Goal: Maintain glucose levels >70mg/dl to <250mg/dl throughout shift  Outcome: Progressing  Goal: No changes in neurological exam by end of shift  Outcome: Progressing  Goal: Learn about and adhere to nutrition recommendations by end of shift  Outcome: Progressing  Goal: Vital signs within normal range for age by end of shift  Outcome: Progressing  Goal: Increase self care and/or family involovement by end of shift  Outcome: Progressing  Goal: Receive DSME education by end of shift  Outcome: Progressing     Problem: Pain  Goal: Takes deep breaths with improved pain control throughout the shift  Outcome: Progressing  Goal: Turns in bed with improved pain control throughout the shift  Outcome: Progressing  Goal: Walks with improved pain control throughout the shift  Outcome: Progressing  Goal: Performs ADL's with improved pain control throughout shift  Outcome: Progressing  Goal: Participates in PT with improved pain control throughout the shift  Outcome: Progressing  Goal: Free from opioid side effects throughout the shift  Outcome: Progressing  Goal: Free from acute confusion related to pain meds throughout the shift  Outcome: Progressing     Problem: Skin  Goal: Decreased wound size/increased tissue granulation at next dressing change  Outcome: Progressing  Goal: Participates in plan/prevention/treatment measures  Outcome: Progressing  Goal: Prevent/manage excess moisture  Outcome: Progressing  Goal: Prevent/minimize sheer/friction injuries  Outcome: Progressing  Goal: Promote/optimize nutrition  Outcome: Progressing  Goal: Promote skin  healing  Outcome: Progressing           The patient's goals for the shift include  Rest     The clinical goals for the shift include adequate pain control    Over the shift, the patient did make progress toward the following goals.

## 2024-03-10 NOTE — PROGRESS NOTES
Department of Plastic and Reconstructive Surgery  Daily Progress Note    Patient Name: Jason Hollins  MRN: [id number]  Date:  03/10/24     Subjective  Resting in bed in left lateral decubitus position. Pain well controlled. Tolerating diet and endorses adequate fluid intake. Denies fever, chest pain, SOB, abd pain, n/v/d.    Objective  Physical Exam   Constitutional: NAD  Eyes: EOMI, clear sclera  Head/Neck: NCAT  Cardiovascular: RRR  Respiratory/Thorax: Unlabored respirations on RA  Gastrointestinal: Abdomen soft, ND/NT.   Musculoskeletal: NWB RLE. +SILT BLE. Intact RLE DP pulses. Compartments soft and compressible.   Neurological: A&Ox3  Psychological: Appropriate mood and behavior  Skin: Warm and dry, no rashes.  Extremity: Pedicle flap to right gluteal region appropriate in color. Pedicle flap warm to touch, soft and compressible with no discoloration. Incisional wound vac intact maintaining suction without obstruction or air leak to right gluteal/thigh region. ANDERSON x4 (x2 to right lower back, x2 to lateral/medial thigh) draining moderate bloody serous drainage. Area appropriately TTP. Maintains lateral lying on left side, with leg flexed no greater than 20 degrees.     Assessment  Jason Hollins is a 63 y.o. male with a past medical history significant for DM II, HPL, HTN, and gluteal myxoid chondrosarcoma s/p radiation. He is now S/P wide resection of gluteal sarcoma, sciatic nerve neurolysis (Per ortho) and right gluteal reconstruction, pedicle ALT, vastus lateralus flap, pedicle gluteal and perisformis flap, pelvic closure, and incisional wound vac placement (Per plastics) on 3/5/24 with Dr. Hawkins and Dr. Smith.    Plan/Recommendations  S/p Right gluteal reconstruction, pedicle ALT, vastus lateralus flap, pedicle gluteal and perisformis flap with incisional wound vac placement.  - Serial flap assessments Q6hour per nursing with interval checks per plastics       ·  Assess flap appearance (color, warmth, turgor) over  windowed area. No doppler assessment required       ·  Nursing to notify plastic surgery team immediately if there are any acute changes          (Including pallor, temperature change, bleeding, etc.)  - Maintain incisional wound vac to right gluteal/lateral thigh region 10-14 days        ·  Settings: -125mmHg low continuous suction        ·  Dressing to remain in place for 10/14 days       ·  Monitor/record vac canister output [id number]       ·  Notify plastic surgery with any concerns of leak or obstruction       ·  [facility]    - Continue ANDERSON drain care per nursing (ANDERSON x4- x2 lower back, x2 lateral/medial thigh)     ·  Strip drain tubing TID and PRN     ·  Monitor and record output q8h      ·  Keep drain sites C/D/I with daily drain dressing changes      ·  Removal per plastic surgery team when output <30 cc/24 hrs for 2 consecutive days   - Maintain left lateral decubitus positioning to avoid pressure to flap region/incisions  - OK to lay flat to use bedpan   - Avoid flexion of knee >20 degrees. Maintain RLE in soft knee immobilizer   - PT/OT starting 3/11 with restrictions   -> NWB RLE   -> No ambulation   -> Avoid flexion of knee >20 degrees   -> No R hip flexion   -> OK to lay flat to work on LLE (no LLE restrictions)  - Strict bedrest x14 days post-operatively   - x3 doses periop Ancef - completed  - Monitor VS/pulse ox [id number]   - Monitor AM CBC/RFP/Mg PRN   - Marino removed POD1- purewick applied to aid in voiding and prevent contamination    # Acute post op pain  - Oxy 5mg (Q4 PRN mod pain), Oxy 10mg (Q4 PRN severe pain)  - Robaxin 1000mg Q8 hours scheduled  - Acetaminophen 650mg Q6 hours scheduled  - Gabapentin 300mg Q8 scheduled  - IV Toradol 30mg Q6 (3/7-3/10)  - Zofran 4mg PRN for opoid associated nausea  - Benadryl 25mg PRN for itching    # DM Type II  - Persistently elevated glucose   - Diabetic diet, glucerna supplemental shakes and fairlife shakes    - Continue home metformin POD1 (1,000mg AM, 1,500mg PM)  - Hypoglycemic protocol in place  - Pt has not seen his PCP for diabetic mgmt in over 6 months; checks blood sugar occasionally at home, typically runs in the 180s   - Consult to inpatient endocrinology for inpatient diabetic management and possible adjustments to home diabetic regimen    -> Glargine 12units at bedtime   -> Lispro 4units with meals   -> Lispro corrective scale #2 with meals     # Hyperlipidemia   - Atorvastatin 40mg daily ordered    # HTN  - BP stable, does not take any home medications    #FEN/GI  - Encouraged PO intake   - Monitor and replete lytes PRN  - Carb controlled diet   - Bowel regimen: Colace 100mg BID and Milk of Mag PRN    #DVT Prophylaxis  - SQ Lovenox   - Resume home ASA 81mg Aspirin   - SCD    - Encouraged use of IS (x10 every hour while awake)    Disposition: Continue care on RNF. Will remain inpatient for continued flap monitoring and vac/drain surveillance postoperatively. Anticipate discontinuation of bedrest on POD#14. Discharge plans pending PT/OT assessment.    Patient and plan discussed with Dr. Stevens, PA-C  Plastic and Reconstructive Surgery  Epic chat, Pager [phone], Team phones: [phone]

## 2024-03-11 LAB
GLUCOSE BLD MANUAL STRIP-MCNC: 144 MG/DL (ref 74–99)
GLUCOSE BLD MANUAL STRIP-MCNC: 152 MG/DL (ref 74–99)
GLUCOSE BLD MANUAL STRIP-MCNC: 166 MG/DL (ref 74–99)
GLUCOSE BLD MANUAL STRIP-MCNC: 171 MG/DL (ref 74–99)

## 2024-03-11 PROCEDURE — 2500000004 HC RX 250 GENERAL PHARMACY W/ HCPCS (ALT 636 FOR OP/ED): Performed by: NURSE PRACTITIONER

## 2024-03-11 PROCEDURE — 82947 ASSAY GLUCOSE BLOOD QUANT: CPT

## 2024-03-11 PROCEDURE — 1170000001 HC PRIVATE ONCOLOGY ROOM DAILY

## 2024-03-11 PROCEDURE — 2500000002 HC RX 250 W HCPCS SELF ADMINISTERED DRUGS (ALT 637 FOR MEDICARE OP, ALT 636 FOR OP/ED)

## 2024-03-11 PROCEDURE — 2500000001 HC RX 250 WO HCPCS SELF ADMINISTERED DRUGS (ALT 637 FOR MEDICARE OP): Performed by: NURSE PRACTITIONER

## 2024-03-11 PROCEDURE — 2500000001 HC RX 250 WO HCPCS SELF ADMINISTERED DRUGS (ALT 637 FOR MEDICARE OP)

## 2024-03-11 PROCEDURE — 99233 SBSQ HOSP IP/OBS HIGH 50: CPT | Performed by: PHYSICIAN ASSISTANT

## 2024-03-11 RX ADMIN — METFORMIN HYDROCHLORIDE 1500 MG: 1000 TABLET ORAL at 22:45

## 2024-03-11 RX ADMIN — METHOCARBAMOL 1000 MG: 100 INJECTION, SOLUTION INTRAMUSCULAR; INTRAVENOUS at 06:13

## 2024-03-11 RX ADMIN — INSULIN LISPRO 2 UNITS: 100 INJECTION, SOLUTION INTRAVENOUS; SUBCUTANEOUS at 17:34

## 2024-03-11 RX ADMIN — GABAPENTIN 300 MG: 300 CAPSULE ORAL at 22:45

## 2024-03-11 RX ADMIN — GABAPENTIN 300 MG: 300 CAPSULE ORAL at 06:13

## 2024-03-11 RX ADMIN — INSULIN LISPRO 4 UNITS: 100 INJECTION, SOLUTION INTRAVENOUS; SUBCUTANEOUS at 08:25

## 2024-03-11 RX ADMIN — METHOCARBAMOL 1000 MG: 100 INJECTION, SOLUTION INTRAMUSCULAR; INTRAVENOUS at 14:35

## 2024-03-11 RX ADMIN — ACETAMINOPHEN 650 MG: 325 TABLET ORAL at 06:13

## 2024-03-11 RX ADMIN — ENOXAPARIN SODIUM 40 MG: 100 INJECTION SUBCUTANEOUS at 22:45

## 2024-03-11 RX ADMIN — INSULIN LISPRO 4 UNITS: 100 INJECTION, SOLUTION INTRAVENOUS; SUBCUTANEOUS at 12:44

## 2024-03-11 RX ADMIN — GABAPENTIN 300 MG: 300 CAPSULE ORAL at 14:35

## 2024-03-11 RX ADMIN — INSULIN LISPRO 4 UNITS: 100 INJECTION, SOLUTION INTRAVENOUS; SUBCUTANEOUS at 17:33

## 2024-03-11 RX ADMIN — ACETAMINOPHEN 650 MG: 325 TABLET ORAL at 23:37

## 2024-03-11 RX ADMIN — ACETAMINOPHEN 650 MG: 325 TABLET ORAL at 12:43

## 2024-03-11 RX ADMIN — METFORMIN HYDROCHLORIDE 1000 MG: 1000 TABLET ORAL at 08:24

## 2024-03-11 RX ADMIN — ASPIRIN 81 MG: 81 TABLET, COATED ORAL at 08:24

## 2024-03-11 RX ADMIN — Medication 1 TABLET: at 08:24

## 2024-03-11 RX ADMIN — METHOCARBAMOL 1000 MG: 100 INJECTION, SOLUTION INTRAMUSCULAR; INTRAVENOUS at 23:37

## 2024-03-11 RX ADMIN — ATORVASTATIN CALCIUM 40 MG: 40 TABLET, FILM COATED ORAL at 22:45

## 2024-03-11 RX ADMIN — INSULIN GLARGINE 12 UNITS: 100 INJECTION, SOLUTION SUBCUTANEOUS at 22:46

## 2024-03-11 RX ADMIN — ACETAMINOPHEN 650 MG: 325 TABLET ORAL at 17:32

## 2024-03-11 ASSESSMENT — COGNITIVE AND FUNCTIONAL STATUS - GENERAL
MOBILITY SCORE: 9
MOVING FROM LYING ON BACK TO SITTING ON SIDE OF FLAT BED WITH BEDRAILS: A LITTLE
TURNING FROM BACK TO SIDE WHILE IN FLAT BAD: A LOT
DRESSING REGULAR UPPER BODY CLOTHING: A LOT
WALKING IN HOSPITAL ROOM: TOTAL
CLIMB 3 TO 5 STEPS WITH RAILING: TOTAL
DAILY ACTIVITIY SCORE: 15
DRESSING REGULAR LOWER BODY CLOTHING: A LOT
PERSONAL GROOMING: A LITTLE
MOVING TO AND FROM BED TO CHAIR: TOTAL
HELP NEEDED FOR BATHING: A LOT
STANDING UP FROM CHAIR USING ARMS: TOTAL
TOILETING: A LOT

## 2024-03-11 ASSESSMENT — PAIN SCALES - GENERAL
PAINLEVEL_OUTOF10: 0 - NO PAIN

## 2024-03-11 ASSESSMENT — PAIN - FUNCTIONAL ASSESSMENT
PAIN_FUNCTIONAL_ASSESSMENT: 0-10

## 2024-03-11 NOTE — PROGRESS NOTES
"  Department of Plastic and Reconstructive Surgery  Daily Progress Note    Patient Name: Jason Hollins  MRN: [id number]  Date:  03/11/24     Subjective  Resting in bed in left lateral decubitus position, tolerating positioning so far. Pain well controlled. Tolerating diet and endorses adequate fluid intake. AM SSI held as patient did not want breakfast this morning since family is bringing him a big lunch this afternoon. Denies fever, chest pain, SOB, abd pain, n/v/d.    Objective    Vital Signs   /71 (BP Location: Right arm, Patient Position: Lying)   Pulse 80   Temp 37.3 °C (99.2 °F) (Temporal)   Resp 18   Ht 1.778 m (5' 10\")   Wt 101 kg (222 lb 14.2 oz)   SpO2 95%   BMI 31.98 kg/m²      Physical Exam   Constitutional: NAD.  Eyes: EOMI, clear sclera.  Head/Neck: NCAT.  Cardiovascular: RRR.  Respiratory/Thorax: Unlabored respirations on RA.  Gastrointestinal: Abdomen soft, ND/NT.   Musculoskeletal: NWB RLE. +SILT BLE. Intact RLE DP pulses. Compartments soft and compressible.   Neurological: A&Ox3.  Psychological: Appropriate mood and behavior.  Skin: Warm and dry, no rashes.  Extremity: Pedicle flap to right gluteal region appropriate in color. Pedicle flap warm to touch, soft and compressible with no discoloration. Incisional wound vac intact maintaining suction without obstruction or air leak to right gluteal/thigh region. ANDERSON x4 (x2 to right lower back, x2 to lateral/medial thigh) draining bloody serous drainage. Area appropriately TTP. Maintains lateral lying on left side, with leg flexed no greater than 20 degrees.     Assessment/Plan  Jason Hollins is a 63 y.o. male with a past medical history significant for DM II, HPL, HTN and gluteal myxoid chondrosarcoma s/p radiation. Patient taken to the OR on 3/5/2024 for wide resection of gluteal sarcoma and sciatic nerve neurolysis per Dr. Hawkins of Orthopedic Surgery followed by right gluteal reconstruction, pedicle ALT, vastus lateralus flap, pedicle gluteal and " perisformis flap, pelvic closure and incisional wound vac placement per Dr. Smith of Plastic Surgery.     # S/p right gluteal reconstruction, pedicle ALT, vastus lateralus flap, pedicle gluteal and perisformis flap with incisional wound vac placement   - Serial flap assessments Q6hour per nursing with interval checks per plastics       ·  Assess flap appearance (color, warmth, turgor) over windowed area (no doppler assessment required)        ·  Nursing to notify plastic surgery team immediately if there are any acute changes          (Including pallor, temperature change, bleeding, etc.)  - Maintain incisional wound vac to right gluteal/lateral thigh region x14 days (anticipated end date 3/19)        ·  Settings: -125mmHg low continuous suction        ·  Monitor/record vac canister output [id number]       ·  Notify plastic surgery with any concerns of leak or obstruction       ·  Anticipate removal on 3/19 while patient remains in house   - Continue ANEDRSON drain care per nursing (4 drains in total: x2 lower back, x2 lateral/medial thigh)       ·  Strip drain tubing TID and PRN       ·  Monitor and record output q8h        ·  Keep drain sites C/D/I with daily drain dressing changes   - Maintain left lateral decubitus positioning to avoid pressure to flap region/incisions  - Okay to lay flat to use bedpan   - Avoid flexion of knee >20 degrees, maintain RLE in soft knee immobilizer   - Strict bedrest x14 days post op (until removal of incisional wound vac on 3/19)   - PT/OT consulted 3/11 with following restrictions:       ·  NWB RLE       ·  No ambulation       ·  Avoid flexion of knee >20 degrees       ·  No R hip flexion       ·  Okay to lay flat to work on LLE (no LLE restrictions)  - Continue use of purewick to aid in voiding and prevent contamination (Marino removed POD#1)   - Monitor VS/pulse ox [id number]   - Monitor AM CBC/RFP/Mg PRN     # Acute post op pain  - Pain well controlled per patient, continue current  regimen        ·  Acetaminophen 650mg Q6 hours scheduled       ·  Robaxin 1000mg Q8 hours scheduled       ·  Gabapentin 300mg Q8 scheduled       ·  Oxycodone 5/10mg [id number] PRN moderate/severe pain   - S/p IV Toradol 30mg Q6 (3/7-3/10)  - Zofran 4mg PRN for opoid associated nausea  - Benadryl 25mg PRN for itching  - Monitor pain assessments [id number]     # Hx DM type II  - Diabetic diet (low carb 60 CHO), glucerna supplemental shakes and fairlife shakes   - Continue home metformin POD1 (1,000mg AM, 1,500mg PM)  - Hypoglycemic protocol in place  - Pt has not seen his PCP for diabetic mgmt in over 6 months; checks blood sugar occasionally at home, typically runs in the 180s   - Consult to inpatient endocrinology for inpatient diabetic management and possible adjustments to home diabetic regimen given persistently elevated glucose levels post operatively        ·  Glargine 12units at bedtime       ·  Lispro 4units with meals       ·  Lispro corrective scale #2 with meals   - Accuchecks TID and at bedtime     # Hx hyperlipidemia   - Continue Atorvastatin 40mg daily    # Hx HTN  - BP stable, does not take any home medications  - Monitor VS [id number]     FEN/GI:   - Encouraged PO intake   - Monitor and replete lytes PRN  - Carb controlled diet   - Protein supplementation TID (glucerna or fairlife shakes)   - Bowel regimen: Colace 100mg BID and Milk of Mag PRN    Prophylaxis:   - DVT: SQ Lovenox 40mg daily, home ASA 81mg daily, SCDs to RLE        ·  Anticipate discharge with ASA 81mg BID as per orthopedic recommendations   - Encouraged use of IS (x10 every hour while awake)    Disposition: Continue care on RNF. Will remain inpatient for continued flap monitoring and vac/drain surveillance postoperatively. Anticipate discontinuation of bedrest on POD14 (3/19). Pending PT/OT evaluation. Patient supplied with SNF list per SW/TCC in anticipation of discharge to facility after completion of bed rest. Will need follow up appointment  with plastic surgery and orthopedic surgery closer to anticipated date of discharge.     Patient and plan discussed with Dr. Natanael PA-C  Plastic and Reconstructive Surgery  Epic chat, Pager [phone], Team phones: [phone]

## 2024-03-11 NOTE — PROGRESS NOTES
Occupational Therapy                 Therapy Communication Note    Patient Name: Jason Hollins  MRN: [id number]  Today's Date: 3/11/2024     Discipline: Occupational Therapy    Missed Visit Reason: Missed Visit Reason:  (Strict bedrest x14 days post-operatively)    Missed Time: Attempt    Comment:

## 2024-03-11 NOTE — PROGRESS NOTES
Physical Therapy                 Therapy Communication Note    Patient Name: Jason Hollins  MRN: [id number]  Today's Date: 3/11/2024     Discipline: Physical Therapy    Missed Visit Reason: Missed Visit Reason: Patient placed on medical hold    Missed Time: Attempt    Comment: Pt on strict bedrest for 14 days, PT is on hold until pt is off strict bedrest. The pt was instructed on a few exercises he is allowed to perform on his left leg and the pt acknowledged an understanding.

## 2024-03-12 LAB
GLUCOSE BLD MANUAL STRIP-MCNC: 159 MG/DL (ref 74–99)
GLUCOSE BLD MANUAL STRIP-MCNC: 162 MG/DL (ref 74–99)
GLUCOSE BLD MANUAL STRIP-MCNC: 172 MG/DL (ref 74–99)
GLUCOSE BLD MANUAL STRIP-MCNC: 191 MG/DL (ref 74–99)

## 2024-03-12 PROCEDURE — 2500000001 HC RX 250 WO HCPCS SELF ADMINISTERED DRUGS (ALT 637 FOR MEDICARE OP)

## 2024-03-12 PROCEDURE — 2500000004 HC RX 250 GENERAL PHARMACY W/ HCPCS (ALT 636 FOR OP/ED): Performed by: NURSE PRACTITIONER

## 2024-03-12 PROCEDURE — 2060000001 HC INTERMEDIATE ICU ROOM DAILY

## 2024-03-12 PROCEDURE — 2500000002 HC RX 250 W HCPCS SELF ADMINISTERED DRUGS (ALT 637 FOR MEDICARE OP, ALT 636 FOR OP/ED)

## 2024-03-12 PROCEDURE — 82947 ASSAY GLUCOSE BLOOD QUANT: CPT

## 2024-03-12 PROCEDURE — 2500000001 HC RX 250 WO HCPCS SELF ADMINISTERED DRUGS (ALT 637 FOR MEDICARE OP): Performed by: NURSE PRACTITIONER

## 2024-03-12 PROCEDURE — 99232 SBSQ HOSP IP/OBS MODERATE 35: CPT | Performed by: PHYSICIAN ASSISTANT

## 2024-03-12 RX ADMIN — INSULIN LISPRO 2 UNITS: 100 INJECTION, SOLUTION INTRAVENOUS; SUBCUTANEOUS at 12:03

## 2024-03-12 RX ADMIN — Medication 1 TABLET: at 08:08

## 2024-03-12 RX ADMIN — INSULIN LISPRO 4 UNITS: 100 INJECTION, SOLUTION INTRAVENOUS; SUBCUTANEOUS at 08:09

## 2024-03-12 RX ADMIN — INSULIN LISPRO 2 UNITS: 100 INJECTION, SOLUTION INTRAVENOUS; SUBCUTANEOUS at 16:44

## 2024-03-12 RX ADMIN — GABAPENTIN 300 MG: 300 CAPSULE ORAL at 14:52

## 2024-03-12 RX ADMIN — METHOCARBAMOL 1000 MG: 100 INJECTION, SOLUTION INTRAMUSCULAR; INTRAVENOUS at 08:08

## 2024-03-12 RX ADMIN — ENOXAPARIN SODIUM 40 MG: 100 INJECTION SUBCUTANEOUS at 21:21

## 2024-03-12 RX ADMIN — GABAPENTIN 300 MG: 300 CAPSULE ORAL at 06:25

## 2024-03-12 RX ADMIN — INSULIN LISPRO 2 UNITS: 100 INJECTION, SOLUTION INTRAVENOUS; SUBCUTANEOUS at 08:08

## 2024-03-12 RX ADMIN — METFORMIN HYDROCHLORIDE 1000 MG: 1000 TABLET ORAL at 08:08

## 2024-03-12 RX ADMIN — METFORMIN HYDROCHLORIDE 1500 MG: 1000 TABLET ORAL at 21:21

## 2024-03-12 RX ADMIN — INSULIN GLARGINE 12 UNITS: 100 INJECTION, SOLUTION SUBCUTANEOUS at 21:22

## 2024-03-12 RX ADMIN — ASPIRIN 81 MG: 81 TABLET, COATED ORAL at 08:08

## 2024-03-12 RX ADMIN — INSULIN LISPRO 4 UNITS: 100 INJECTION, SOLUTION INTRAVENOUS; SUBCUTANEOUS at 16:45

## 2024-03-12 RX ADMIN — ACETAMINOPHEN 650 MG: 325 TABLET ORAL at 18:30

## 2024-03-12 RX ADMIN — ACETAMINOPHEN 650 MG: 325 TABLET ORAL at 12:03

## 2024-03-12 RX ADMIN — METHOCARBAMOL 1000 MG: 100 INJECTION, SOLUTION INTRAMUSCULAR; INTRAVENOUS at 14:52

## 2024-03-12 RX ADMIN — GABAPENTIN 300 MG: 300 CAPSULE ORAL at 21:21

## 2024-03-12 RX ADMIN — INSULIN LISPRO 4 UNITS: 100 INJECTION, SOLUTION INTRAVENOUS; SUBCUTANEOUS at 12:04

## 2024-03-12 RX ADMIN — ACETAMINOPHEN 650 MG: 325 TABLET ORAL at 06:25

## 2024-03-12 RX ADMIN — ATORVASTATIN CALCIUM 40 MG: 40 TABLET, FILM COATED ORAL at 21:21

## 2024-03-12 ASSESSMENT — COGNITIVE AND FUNCTIONAL STATUS - GENERAL
CLIMB 3 TO 5 STEPS WITH RAILING: TOTAL
EATING MEALS: A LITTLE
DRESSING REGULAR LOWER BODY CLOTHING: TOTAL
TOILETING: A LOT
STANDING UP FROM CHAIR USING ARMS: TOTAL
TOILETING: TOTAL
TURNING FROM BACK TO SIDE WHILE IN FLAT BAD: A LOT
HELP NEEDED FOR BATHING: A LOT
STANDING UP FROM CHAIR USING ARMS: TOTAL
MOVING TO AND FROM BED TO CHAIR: TOTAL
MOBILITY SCORE: 9
WALKING IN HOSPITAL ROOM: TOTAL
DRESSING REGULAR LOWER BODY CLOTHING: A LOT
HELP NEEDED FOR BATHING: TOTAL
TURNING FROM BACK TO SIDE WHILE IN FLAT BAD: TOTAL
DRESSING REGULAR UPPER BODY CLOTHING: A LOT
MOVING FROM LYING ON BACK TO SITTING ON SIDE OF FLAT BED WITH BEDRAILS: A LITTLE
PERSONAL GROOMING: A LITTLE
DAILY ACTIVITIY SCORE: 15
MOVING FROM LYING ON BACK TO SITTING ON SIDE OF FLAT BED WITH BEDRAILS: TOTAL
DRESSING REGULAR UPPER BODY CLOTHING: TOTAL
MOBILITY SCORE: 6
PERSONAL GROOMING: A LITTLE
DAILY ACTIVITIY SCORE: 10
CLIMB 3 TO 5 STEPS WITH RAILING: TOTAL
MOVING TO AND FROM BED TO CHAIR: TOTAL
WALKING IN HOSPITAL ROOM: TOTAL

## 2024-03-12 ASSESSMENT — PAIN SCALES - GENERAL
PAINLEVEL_OUTOF10: 0 - NO PAIN

## 2024-03-12 ASSESSMENT — PAIN - FUNCTIONAL ASSESSMENT
PAIN_FUNCTIONAL_ASSESSMENT: 0-10

## 2024-03-12 NOTE — PROGRESS NOTES
"  Department of Plastic and Reconstructive Surgery  Daily Progress Note    Patient Name: Jason Hollins  MRN: [id number]  Date:  03/12/24     Subjective  Resting in bed in left lateral decubitus position, reports he is tired today.  Pain well controlled. Tolerating diet and endorses adequate fluid intake. Reports two episodes of diarrhea yesterday afternoon after eating Turkish food. Currently has a FMS in place which he feels is easier while he is on bedrest. Denies fever, chest pain, SOB, abd pain, n/v/d.    Objective    Vital Signs   /64   Pulse 67   Temp 36.8 °C (98.2 °F) (Temporal)   Resp 18   Ht 1.778 m (5' 10\")   Wt 101 kg (222 lb 14.2 oz)   SpO2 96%   BMI 31.98 kg/m²      Physical Exam   Constitutional: NAD.  Eyes: EOMI, clear sclera.  Head/Neck: NCAT.  Cardiovascular: RRR.  Respiratory/Thorax: Unlabored respirations on RA.  Gastrointestinal: Abdomen soft, ND/NT. FMS in place.   Musculoskeletal: NWB RLE. +SILT BLE. Intact RLE DP pulses. Compartments soft and compressible.   Neurological: A&Ox3.  Psychological: Appropriate mood and behavior.  Skin: Warm and dry, no rashes.  Extremity: Pedicle flap to right gluteal region appropriate in color. Pedicle flap warm to touch, soft and compressible with no discoloration. Incisional wound vac intact maintaining suction without obstruction or air leak to right gluteal/thigh region. ANDERSON x4 (x2 to right lower back, x2 to lateral/medial thigh) draining bloody serous drainage. Area appropriately TTP. Maintains lateral lying on left side, with leg flexed no greater than 20 degrees.     Assessment/Plan  Jason Hollins is a 63 y.o. male with a past medical history significant for DM II, HPL, HTN and gluteal myxoid chondrosarcoma s/p radiation. Patient taken to the OR on 3/5/2024 for wide resection of gluteal sarcoma and sciatic nerve neurolysis per Dr. Hawkins of Orthopedic Surgery followed by right gluteal reconstruction, pedicle ALT, vastus lateralus flap, pedicle gluteal " and perisformis flap, pelvic closure and incisional wound vac placement per Dr. Smith of Plastic Surgery.     # S/p right gluteal reconstruction, pedicle ALT, vastus lateralus flap, pedicle gluteal and perisformis flap with incisional wound vac placement   - Serial flap assessments Q6hour per nursing with interval checks per plastics       ·  Assess flap appearance (color, warmth, turgor) over windowed area (no doppler assessment required)        ·  Nursing to notify plastic surgery team immediately if there are any acute changes          (Including pallor, temperature change, bleeding, etc.)  - Maintain incisional wound vac to right gluteal/lateral thigh region x14 days (anticipated end date 3/19)        ·  Settings: -125mmHg low continuous suction        ·  Monitor/record vac canister output [id number]       ·  Notify plastic surgery with any concerns of leak or obstruction       ·  Anticipate removal on 3/19 while patient remains in house   - Continue ANDERSON drain care per nursing (4 drains in total: x2 lower back, x2 lateral/medial thigh)       ·  Strip drain tubing TID and PRN       ·  Monitor and record output q8h        ·  Keep drain sites C/D/I with daily drain dressing changes   - Maintain left lateral decubitus positioning to avoid pressure to flap region/incisions  - Cont FMS per patient's preference, monitor output  - Avoid flexion of knee >20 degrees, maintain RLE in soft knee immobilizer   - Strict bedrest x14 days post op (until removal of incisional wound vac on 3/19)   - PT/OT consulted 3/11 with following restrictions:       ·  NWB RLE       ·  No ambulation       ·  Avoid flexion of knee >20 degrees       ·  No R hip flexion       ·  Okay to lay flat to work on LLE (no LLE restrictions)  - Continue use of purewick to aid in voiding and prevent contamination (Marino removed POD#1)   - Monitor VS/pulse ox [id number]   - Monitor AM CBC/RFP/Mg PRN     # Acute post op pain  - Pain well controlled per patient,  continue current regimen        ·  Acetaminophen 650mg Q6 hours scheduled       ·  Robaxin 1000mg Q8 hours scheduled       ·  Gabapentin 300mg Q8 scheduled       ·  Oxycodone 5/10mg [id number] PRN moderate/severe pain   - S/p IV Toradol 30mg Q6 (3/7-3/10)  - Zofran 4mg PRN for opoid associated nausea  - Benadryl 25mg PRN for itching  - Monitor pain assessments [id number]     # Hx DM type II  - Diabetic diet (low carb 60 CHO), glucerna supplemental shakes and fairlife shakes   - Continue home metformin POD1 (1,000mg AM, 1,500mg PM)  - Hypoglycemic protocol in place  - Pt has not seen his PCP for diabetic mgmt in over 6 months; checks blood sugar occasionally at home, typically runs in the 180s   - Consult to inpatient endocrinology for inpatient diabetic management and possible adjustments to home diabetic regimen given persistently elevated glucose levels post operatively        ·  Glargine 12units at bedtime       ·  Lispro 4units with meals       ·  Lispro corrective scale #2 with meals   - Accuchecks TID and at bedtime     # Hx hyperlipidemia   - Continue Atorvastatin 40mg daily    # Hx HTN  - BP stable, does not take any home medications  - Monitor VS [id number]     FEN/GI:   - Encouraged PO intake   - Monitor and replete lytes PRN  - Carb controlled diet   - Protein supplementation TID (glucerna or fairlife shakes)   - Bowel regimen: Colace 100mg BID and Milk of Mag PRN    Prophylaxis:   - DVT: SQ Lovenox 40mg daily, home ASA 81mg daily, SCDs to RLE        ·  Anticipate discharge with ASA 81mg BID as per orthopedic recommendations   - Encouraged use of IS (x10 every hour while awake)    Disposition: Continue care on RNF. Will remain inpatient for continued flap monitoring and vac/drain surveillance postoperatively. Anticipate discontinuation of bedrest on POD14 (3/19). Pending PT/OT evaluation. Patient supplied with SNF list per SW/TCC in anticipation of discharge to facility after completion of bed rest. Will need  follow up appointment with plastic surgery and orthopedic surgery closer to anticipated date of discharge.     Patient and plan discussed with Dr. Tata PA-C   Plastic and Reconstructive Surgery   Available via [employer], pager: [phone] or Team phones: [phone]

## 2024-03-12 NOTE — PROGRESS NOTES
TCC Note 3-12-24      Plan per Medical/Surgical Team: s/p (R) glutal reconstruction, vastus lateralus flap, gluteal and perisformis flap with incisional wound vac placement. Flap checks, incisional wound vac/care, bedrest until 3/19. Purewick  aid invoiding,  ANDERSON drain   Status: Inpatient  Payor Source: [employer]  Discharge disposition:  Expected date of discharge: 3/20/24 TBD  Barriers:  Preferred home care agency:  Will continue to follow patient for any discharge needs.     TCC Note 3/6/24    Plan per Medical/Surgical Team: POD#1 s/p r glutal reconstruction, vastus lateralus flap, gluteal and perisformis flap with incisional wound vac placement. Flap checks, incisional wound vac/care, bedrest x10 days, ANDERSON daniels drain   Status: Inpatient  Payor Source: [employer]  Discharge disposition: SNF vs Home with HC  Expected date of discharge: 3/13/24   Barriers: Medical  Preferred home care agency:[facility]  Will continue to follow patient for any discharge needs. Debbie Lainez RN, [facility]   03/06/24 1411   Discharge Planning   Living Arrangements Spouse/significant other   Support Systems Spouse/significant other   Assistance Needed Patient is independent with adls, does not use assistive devices.   Type of Residence Private residence   Number of Stairs to Enter Residence 2   Number of Stairs Within Residence 12   Do you have animals or pets at home? No   Who is requesting discharge planning? Provider   Home or Post Acute Services Post acute facilities (Rehab/SNF/etc)   Type of Post Acute Facility Services Rehab   Patient expects to be discharged to: Home with HC vs SNF   Does the patient need discharge transport arranged? Yes   RoundTrip coordination needed? Yes   Has discharge transport been arranged? No     Met with patient and introduced myself as care cooordinator and member ot Care transitions team for discharge planning.  Patient is independent, does not use assistive devices. Wife is primary teachable caregiver. Patient denies  falls, feels safe at home. [facility] A[facility] if needed, patient is also agreeable to SNF if needed. PCP: Mary Arenas CNP. Pharmacy: [address] Demographics and contacts verified. Will continue to assist with discharge needs. Debbie Lainez RN, TCC

## 2024-03-12 NOTE — PROGRESS NOTES
TCC Note 3-12-24      Plan per Medical/Surgical Team: s/p (R) glutal reconstruction, vastus lateralus flap, gluteal and perisformis flap with incisional wound vac placement. Flap checks, incisional wound vac/care, bedrest until 3/19. Purewick  aid invoiding,  ANDERSON drain   Status: Inpatient  Payor Source: [employer]  Discharge disposition:  Expected date of discharge: 3/20/24 TBD  Barriers:  Preferred home care agency:  Will continue to follow patient for any discharge needs.     TCC Note 3/6/24    Plan per Medical/Surgical Team: POD#1 s/p r glutal reconstruction, vastus lateralus flap, gluteal and perisformis flap with incisional wound vac placement. Flap checks, incisional wound vac/care, bedrest x10 days, ANDERSON daniels drain   Status: Inpatient  Payor Source: [employer]  Discharge disposition: SNF vs Home with HC  Expected date of discharge: 3/13/24   Barriers: Medical  Preferred home care agency:[facility]  Will continue to follow patient for any discharge needs. Debbie Lainez RN, [facility]   03/06/24 1411   Discharge Planning   Living Arrangements Spouse/significant other   Support Systems Spouse/significant other   Assistance Needed Patient is independent with adls, does not use assistive devices.   Type of Residence Private residence   Number of Stairs to Enter Residence 2   Number of Stairs Within Residence 12   Do you have animals or pets at home? No   Who is requesting discharge planning? Provider   Home or Post Acute Services Post acute facilities (Rehab/SNF/etc)   Type of Post Acute Facility Services Rehab   Patient expects to be discharged to: Home with HC vs SNF   Does the patient need discharge transport arranged? Yes   RoundTrip coordination needed? Yes   Has discharge transport been arranged? No     Met with patient and introduced myself as care cooordinator and member ot Care transitions team for discharge planning.  Patient is independent, does not use assistive devices. Wife is primary teachable caregiver. Patient denies  falls, feels safe at home. [facility] if needed, patient is also agreeable to SNF if needed. PCP: Mary Arenas CNP. Pharmacy: [address] Demographics and contacts verified. Will continue to assist with discharge needs. Debbie Lainez, RN, [facility]    3/12/24  [id number]  SW provided pt with list of in network SNFs.  SW encouraged pt to make at least 4 choices for SNF.  Pt reports he will let SW know after speaking with his spouse.  Support provided.  Will follow.  COOPER Francois

## 2024-03-12 NOTE — CARE PLAN
The clinical goals for the shift include Pain management    Over the shift, the patient did make progress toward the following goals.     Problem: Diabetes  Goal: Achieve decreasing blood glucose levels by end of shift  Outcome: Progressing  Goal: Increase stability of blood glucose readings by end of shift  Outcome: Progressing  Goal: Decrease in ketones present in urine by end of shift  Outcome: Progressing  Goal: Maintain electrolyte levels within acceptable range throughout shift  Outcome: Progressing  Goal: Maintain glucose levels >70mg/dl to <250mg/dl throughout shift  Outcome: Progressing  Goal: No changes in neurological exam by end of shift  Outcome: Progressing  Goal: Learn about and adhere to nutrition recommendations by end of shift  Outcome: Progressing  Goal: Vital signs within normal range for age by end of shift  Outcome: Progressing  Goal: Increase self care and/or family involovement by end of shift  Outcome: Progressing  Goal: Receive DSME education by end of shift  Outcome: Progressing     Problem: Pain  Goal: Takes deep breaths with improved pain control throughout the shift  Outcome: Progressing  Goal: Turns in bed with improved pain control throughout the shift  Outcome: Progressing  Goal: Walks with improved pain control throughout the shift  Outcome: Progressing  Goal: Performs ADL's with improved pain control throughout shift  Outcome: Progressing  Goal: Participates in PT with improved pain control throughout the shift  Outcome: Progressing  Goal: Free from opioid side effects throughout the shift  Outcome: Progressing  Goal: Free from acute confusion related to pain meds throughout the shift  Outcome: Progressing     Problem: Skin  Goal: Decreased wound size/increased tissue granulation at next dressing change  Outcome: Progressing  Flowsheets (Taken 3/12/2024 5831)  Decreased wound size/increased tissue granulation at next dressing change:   Promote sleep for wound healing   Utilize  specialty bed per algorithm  Goal: Participates in plan/prevention/treatment measures  Outcome: Progressing  Flowsheets (Taken 3/12/2024 0526)  Participates in plan/prevention/treatment measures: Elevate heels  Goal: Prevent/manage excess moisture  Outcome: Progressing  Flowsheets (Taken 3/12/2024 0526)  Prevent/manage excess moisture:   Moisturize dry skin   Cleanse incontinence/protect with barrier cream   Monitor for/manage infection if present  Goal: Prevent/minimize sheer/friction injuries  Outcome: Progressing  Flowsheets (Taken 3/12/2024 0526)  Prevent/minimize sheer/friction injuries:   HOB 30 degrees or less   Use pull sheet   Utilize specialty bed per algorithm  Goal: Promote/optimize nutrition  Outcome: Progressing  Flowsheets (Taken 3/12/2024 0526)  Promote/optimize nutrition:   Consume > 50% meals/supplements   Monitor/record intake including meals   Offer water/supplements/favorite foods  Goal: Promote skin healing  Outcome: Progressing  Flowsheets (Taken 3/12/2024 0526)  Promote skin healing:   Assess skin/pad under line(s)/device(s)   Rotate device position/do not position patient on device

## 2024-03-12 NOTE — CARE PLAN
The clinical goals for the shift include Pain management      Problem: Diabetes  Goal: Achieve decreasing blood glucose levels by end of shift  Outcome: Progressing  Goal: Increase stability of blood glucose readings by end of shift  Outcome: Progressing  Goal: Decrease in ketones present in urine by end of shift  Outcome: Progressing  Goal: Maintain electrolyte levels within acceptable range throughout shift  Outcome: Progressing  Goal: Maintain glucose levels >70mg/dl to <250mg/dl throughout shift  Outcome: Progressing  Goal: No changes in neurological exam by end of shift  Outcome: Progressing  Goal: Learn about and adhere to nutrition recommendations by end of shift  Outcome: Progressing  Goal: Vital signs within normal range for age by end of shift  Outcome: Progressing  Goal: Increase self care and/or family involovement by end of shift  Outcome: Progressing  Goal: Receive DSME education by end of shift  Outcome: Progressing     Problem: Pain  Goal: Takes deep breaths with improved pain control throughout the shift  Outcome: Progressing  Goal: Turns in bed with improved pain control throughout the shift  Outcome: Progressing  Goal: Walks with improved pain control throughout the shift  Outcome: Progressing  Goal: Performs ADL's with improved pain control throughout shift  Outcome: Progressing  Goal: Participates in PT with improved pain control throughout the shift  Outcome: Progressing  Goal: Free from opioid side effects throughout the shift  Outcome: Progressing  Goal: Free from acute confusion related to pain meds throughout the shift  Outcome: Progressing     Problem: Skin  Goal: Decreased wound size/increased tissue granulation at next dressing change  Outcome: Progressing  Flowsheets (Taken 3/12/2024 3199)  Decreased wound size/increased tissue granulation at next dressing change:   Promote sleep for wound healing   Protective dressings over bony prominences  Goal: Participates in  plan/prevention/treatment measures  Outcome: Progressing  Flowsheets (Taken 3/12/2024 1659)  Participates in plan/prevention/treatment measures:   Discuss with provider PT/OT consult   Elevate heels   Increase activity/out of bed for meals  Goal: Prevent/manage excess moisture  Outcome: Progressing  Flowsheets (Taken 3/12/2024 1659)  Prevent/manage excess moisture:   Cleanse incontinence/protect with barrier cream   Monitor for/manage infection if present  Goal: Prevent/minimize sheer/friction injuries  Outcome: Progressing  Flowsheets (Taken 3/12/2024 1659)  Prevent/minimize sheer/friction injuries:   HOB 30 degrees or less   Use pull sheet  Goal: Promote/optimize nutrition  Outcome: Progressing  Flowsheets (Taken 3/12/2024 1659)  Promote/optimize nutrition:   Consume > 50% meals/supplements   Offer water/supplements/favorite foods   Monitor/record intake including meals  Goal: Promote skin healing  Outcome: Progressing  Flowsheets (Taken 3/12/2024 1659)  Promote skin healing:   Assess skin/pad under line(s)/device(s)   Protective dressings over bony prominences

## 2024-03-13 LAB
ALBUMIN SERPL BCP-MCNC: 3.2 G/DL (ref 3.4–5)
ANION GAP SERPL CALC-SCNC: 12 MMOL/L (ref 10–20)
BUN SERPL-MCNC: 15 MG/DL (ref 6–23)
CALCIUM SERPL-MCNC: 8.6 MG/DL (ref 8.6–10.6)
CHLORIDE SERPL-SCNC: 110 MMOL/L (ref 98–107)
CO2 SERPL-SCNC: 24 MMOL/L (ref 21–32)
CREAT SERPL-MCNC: 0.64 MG/DL (ref 0.5–1.3)
EGFRCR SERPLBLD CKD-EPI 2021: >90 ML/MIN/1.73M*2
ERYTHROCYTE [DISTWIDTH] IN BLOOD BY AUTOMATED COUNT: 13.9 % (ref 11.5–14.5)
GLUCOSE BLD MANUAL STRIP-MCNC: 121 MG/DL (ref 74–99)
GLUCOSE BLD MANUAL STRIP-MCNC: 137 MG/DL (ref 74–99)
GLUCOSE BLD MANUAL STRIP-MCNC: 166 MG/DL (ref 74–99)
GLUCOSE BLD MANUAL STRIP-MCNC: 169 MG/DL (ref 74–99)
GLUCOSE SERPL-MCNC: 154 MG/DL (ref 74–99)
HCT VFR BLD AUTO: 31.7 % (ref 41–52)
HGB BLD-MCNC: 10.5 G/DL (ref 13.5–17.5)
MCH RBC QN AUTO: 28.1 PG (ref 26–34)
MCHC RBC AUTO-ENTMCNC: 33.1 G/DL (ref 32–36)
MCV RBC AUTO: 85 FL (ref 80–100)
NRBC BLD-RTO: 0 /100 WBCS (ref 0–0)
PHOSPHATE SERPL-MCNC: 3.2 MG/DL (ref 2.5–4.9)
PLATELET # BLD AUTO: 258 X10*3/UL (ref 150–450)
POTASSIUM SERPL-SCNC: 4 MMOL/L (ref 3.5–5.3)
RBC # BLD AUTO: 3.74 X10*6/UL (ref 4.5–5.9)
SODIUM SERPL-SCNC: 142 MMOL/L (ref 136–145)
WBC # BLD AUTO: 5.9 X10*3/UL (ref 4.4–11.3)

## 2024-03-13 PROCEDURE — 80069 RENAL FUNCTION PANEL: CPT | Performed by: PHYSICIAN ASSISTANT

## 2024-03-13 PROCEDURE — 36415 COLL VENOUS BLD VENIPUNCTURE: CPT | Performed by: PHYSICIAN ASSISTANT

## 2024-03-13 PROCEDURE — 85027 COMPLETE CBC AUTOMATED: CPT | Performed by: PHYSICIAN ASSISTANT

## 2024-03-13 PROCEDURE — 2500000001 HC RX 250 WO HCPCS SELF ADMINISTERED DRUGS (ALT 637 FOR MEDICARE OP): Performed by: NURSE PRACTITIONER

## 2024-03-13 PROCEDURE — 2060000001 HC INTERMEDIATE ICU ROOM DAILY

## 2024-03-13 PROCEDURE — 2500000004 HC RX 250 GENERAL PHARMACY W/ HCPCS (ALT 636 FOR OP/ED): Performed by: NURSE PRACTITIONER

## 2024-03-13 PROCEDURE — 2500000001 HC RX 250 WO HCPCS SELF ADMINISTERED DRUGS (ALT 637 FOR MEDICARE OP)

## 2024-03-13 PROCEDURE — 2500000002 HC RX 250 W HCPCS SELF ADMINISTERED DRUGS (ALT 637 FOR MEDICARE OP, ALT 636 FOR OP/ED)

## 2024-03-13 PROCEDURE — 82947 ASSAY GLUCOSE BLOOD QUANT: CPT

## 2024-03-13 PROCEDURE — 99232 SBSQ HOSP IP/OBS MODERATE 35: CPT | Performed by: PHYSICIAN ASSISTANT

## 2024-03-13 RX ADMIN — ACETAMINOPHEN 650 MG: 325 TABLET ORAL at 11:29

## 2024-03-13 RX ADMIN — DOCUSATE SODIUM 100 MG: 100 CAPSULE, LIQUID FILLED ORAL at 08:58

## 2024-03-13 RX ADMIN — ENOXAPARIN SODIUM 40 MG: 100 INJECTION SUBCUTANEOUS at 21:45

## 2024-03-13 RX ADMIN — ATORVASTATIN CALCIUM 40 MG: 40 TABLET, FILM COATED ORAL at 21:46

## 2024-03-13 RX ADMIN — ACETAMINOPHEN 650 MG: 325 TABLET ORAL at 23:25

## 2024-03-13 RX ADMIN — INSULIN LISPRO 2 UNITS: 100 INJECTION, SOLUTION INTRAVENOUS; SUBCUTANEOUS at 09:04

## 2024-03-13 RX ADMIN — ACETAMINOPHEN 650 MG: 325 TABLET ORAL at 17:20

## 2024-03-13 RX ADMIN — ASPIRIN 81 MG: 81 TABLET, COATED ORAL at 08:58

## 2024-03-13 RX ADMIN — METFORMIN HYDROCHLORIDE 1000 MG: 1000 TABLET ORAL at 08:58

## 2024-03-13 RX ADMIN — INSULIN LISPRO 4 UNITS: 100 INJECTION, SOLUTION INTRAVENOUS; SUBCUTANEOUS at 09:05

## 2024-03-13 RX ADMIN — GABAPENTIN 300 MG: 300 CAPSULE ORAL at 21:46

## 2024-03-13 RX ADMIN — ACETAMINOPHEN 650 MG: 325 TABLET ORAL at 00:18

## 2024-03-13 RX ADMIN — GABAPENTIN 300 MG: 300 CAPSULE ORAL at 06:05

## 2024-03-13 RX ADMIN — INSULIN LISPRO 2 UNITS: 100 INJECTION, SOLUTION INTRAVENOUS; SUBCUTANEOUS at 17:21

## 2024-03-13 RX ADMIN — INSULIN GLARGINE 12 UNITS: 100 INJECTION, SOLUTION SUBCUTANEOUS at 23:16

## 2024-03-13 RX ADMIN — METFORMIN HYDROCHLORIDE 1500 MG: 1000 TABLET ORAL at 21:45

## 2024-03-13 RX ADMIN — GABAPENTIN 300 MG: 300 CAPSULE ORAL at 14:06

## 2024-03-13 RX ADMIN — ACETAMINOPHEN 650 MG: 325 TABLET ORAL at 06:05

## 2024-03-13 RX ADMIN — INSULIN LISPRO 4 UNITS: 100 INJECTION, SOLUTION INTRAVENOUS; SUBCUTANEOUS at 17:21

## 2024-03-13 RX ADMIN — METHOCARBAMOL 1000 MG: 100 INJECTION, SOLUTION INTRAMUSCULAR; INTRAVENOUS at 08:58

## 2024-03-13 RX ADMIN — METHOCARBAMOL 1000 MG: 100 INJECTION, SOLUTION INTRAMUSCULAR; INTRAVENOUS at 00:18

## 2024-03-13 RX ADMIN — Medication 1 TABLET: at 08:58

## 2024-03-13 ASSESSMENT — COGNITIVE AND FUNCTIONAL STATUS - GENERAL
MOVING FROM LYING ON BACK TO SITTING ON SIDE OF FLAT BED WITH BEDRAILS: TOTAL
STANDING UP FROM CHAIR USING ARMS: TOTAL
MOVING FROM LYING ON BACK TO SITTING ON SIDE OF FLAT BED WITH BEDRAILS: TOTAL
DRESSING REGULAR LOWER BODY CLOTHING: TOTAL
DRESSING REGULAR UPPER BODY CLOTHING: TOTAL
DAILY ACTIVITIY SCORE: 10
MOVING TO AND FROM BED TO CHAIR: TOTAL
TURNING FROM BACK TO SIDE WHILE IN FLAT BAD: TOTAL
TOILETING: TOTAL
PERSONAL GROOMING: A LOT
MOVING TO AND FROM BED TO CHAIR: TOTAL
HELP NEEDED FOR BATHING: TOTAL
HELP NEEDED FOR BATHING: TOTAL
DAILY ACTIVITIY SCORE: 9
MOBILITY SCORE: 6
WALKING IN HOSPITAL ROOM: TOTAL
DRESSING REGULAR UPPER BODY CLOTHING: TOTAL
TOILETING: TOTAL
CLIMB 3 TO 5 STEPS WITH RAILING: TOTAL
WALKING IN HOSPITAL ROOM: TOTAL
EATING MEALS: A LITTLE
TURNING FROM BACK TO SIDE WHILE IN FLAT BAD: TOTAL
EATING MEALS: A LITTLE
PERSONAL GROOMING: A LITTLE
STANDING UP FROM CHAIR USING ARMS: TOTAL
CLIMB 3 TO 5 STEPS WITH RAILING: TOTAL
MOBILITY SCORE: 6
DRESSING REGULAR LOWER BODY CLOTHING: TOTAL

## 2024-03-13 ASSESSMENT — PAIN SCALES - GENERAL
PAINLEVEL_OUTOF10: 0 - NO PAIN
PAINLEVEL_OUTOF10: 3

## 2024-03-13 ASSESSMENT — PAIN - FUNCTIONAL ASSESSMENT: PAIN_FUNCTIONAL_ASSESSMENT: 0-10

## 2024-03-13 NOTE — CARE PLAN
The patient's goals for the shift include  to refrain from taking narcotics to control pain    The clinical goals for the shift include pt to remain HDS throughout shift.

## 2024-03-13 NOTE — PROGRESS NOTES
"  Department of Plastic and Reconstructive Surgery  Daily Progress Note    Patient Name: Jason Hollins  MRN: [id number]  Date:  03/13/24     Subjective  Resting in bed in left lateral decubitus position, no complaints at this time.  Pain well controlled. Tolerating diet and endorses adequate fluid intake. FMS still in place. Denies fever, chest pain, SOB, abd pain, n/v/d.    Objective    Vital Signs   /69   Pulse 71   Temp 36.2 °C (97.2 °F) (Temporal)   Resp 18   Ht 1.778 m (5' 10\")   Wt 101 kg (222 lb 14.2 oz)   SpO2 94%   BMI 31.98 kg/m²      Physical Exam   Constitutional: NAD.  Eyes: EOMI, clear sclera.  Head/Neck: NCAT.  Cardiovascular: RRR.  Respiratory/Thorax: Unlabored respirations on RA.  Gastrointestinal: Abdomen soft, ND/NT. FMS in place.   Musculoskeletal: NWB RLE. +SILT BLE. Intact RLE DP pulses. Compartments soft and compressible.   Neurological: A&Ox3.  Psychological: Appropriate mood and behavior.  Skin: Warm and dry, no rashes.  Extremity: Pedicle flap to right gluteal region appropriate in color. Pedicle flap warm to touch, soft and compressible with no discoloration. Incisional wound vac intact maintaining suction without obstruction or air leak to right gluteal/thigh region. Mild ecchymosis surrounding the flap site. ANDERSON x4 (x2 to right lower back, x2 to lateral/medial thigh) draining bloody serous drainage. Area appropriately TTP. Maintains lateral lying on left side, with leg flexed no greater than 20 degrees.     Assessment/Plan  Jason Hollins is a 63 y.o. male with a past medical history significant for DM II, HPL, HTN and gluteal myxoid chondrosarcoma s/p radiation. Patient taken to the OR on 3/5/2024 for wide resection of gluteal sarcoma and sciatic nerve neurolysis per Dr. Hawkins of Orthopedic Surgery followed by right gluteal reconstruction, pedicle ALT, vastus lateralus flap, pedicle gluteal and perisformis flap, pelvic closure and incisional wound vac placement per Dr. Smith of " Plastic Surgery.     # S/p right gluteal reconstruction, pedicle ALT, vastus lateralus flap, pedicle gluteal and perisformis flap with incisional wound vac placement   - Serial flap assessments Q6hour per nursing with interval checks per plastics       ·  Assess flap appearance (color, warmth, turgor) over windowed area (no doppler assessment required)        ·  Nursing to notify plastic surgery team immediately if there are any acute changes          (Including pallor, temperature change, bleeding, etc.)  - Maintain incisional wound vac to right gluteal/lateral thigh region x14 days (anticipated end date 3/19)        ·  Settings: -125mmHg low continuous suction        ·  Monitor/record vac canister output [id number]       ·  Notify plastic surgery with any concerns of leak or obstruction       ·  Anticipate removal on 3/19 while patient remains in house   - Continue ANDERSON drain care per nursing (4 drains in total: x2 lower back, x2 lateral/medial thigh)       ·  Strip drain tubing TID and PRN       ·  Monitor and record output q8h        ·  Keep drain sites C/D/I with daily drain dressing changes   - Maintain left lateral decubitus positioning to avoid pressure to flap region/incisions  - Cont FMS per patient's preference, monitor output  - Avoid flexion of knee >20 degrees, maintain RLE in soft knee immobilizer   - Strict bedrest x14 days post op (until removal of incisional wound vac on 3/19)   - PT/OT consulted 3/11 with following restrictions:       ·  NWB RLE       ·  No ambulation       ·  Avoid flexion of knee >20 degrees       ·  No R hip flexion       ·  Okay to lay flat to work on LLE (no LLE restrictions)  - Continue use of purewick to aid in voiding and prevent contamination (Marino removed POD#1)   - Monitor VS/pulse ox [id number]   - Monitor AM CBC/RFP/Mg PRN     # Acute post op pain  - Pain well controlled per patient, continue current regimen        ·  Acetaminophen 650mg Q6 hours scheduled       ·   Discontinue IV Robaxin 1000mg Q8 hours on 3/13, may resume PO after 48 hour drug-free interval       ·  Gabapentin 300mg Q8 scheduled       ·  Oxycodone 5/10mg [id number] PRN moderate/severe pain   - S/p IV Toradol 30mg Q6 (3/7-3/10)  - Zofran 4mg PRN for opoid associated nausea  - Benadryl 25mg PRN for itching  - Monitor pain assessments [id number]     # Hx DM type II  - Diabetic diet (low carb 60 CHO), glucerna supplemental shakes and fairlife shakes   - Continue home metformin POD1 (1,000mg AM, 1,500mg PM)  - Hypoglycemic protocol in place  - Pt has not seen his PCP for diabetic mgmt in over 6 months; checks blood sugar occasionally at home, typically runs in the 180s   - Consult to inpatient endocrinology for inpatient diabetic management and possible adjustments to home diabetic regimen given persistently elevated glucose levels post operatively        ·  Glargine 12units at bedtime       ·  Lispro 4units with meals       ·  Lispro corrective scale #2 with meals   - Accuchecks TID and at bedtime     # Hx hyperlipidemia   - Continue Atorvastatin 40mg daily    # Hx HTN  - BP stable, does not take any home medications  - Monitor VS [id number]     FEN/GI:   - Encouraged PO intake   - Monitor and replete lytes PRN  - Carb controlled diet   - Protein supplementation TID (glucerna or fairlife shakes)   - Bowel regimen: Colace 100mg BID and Milk of Mag PRN    Prophylaxis:   - DVT: SQ Lovenox 40mg daily, home ASA 81mg daily, SCDs to RLE        ·  Anticipate discharge with ASA 81mg BID as per orthopedic recommendations   - Encouraged use of IS (x10 every hour while awake)    Disposition: Continue care on RNF. Will remain inpatient for continued flap monitoring and vac/drain surveillance postoperatively. Anticipate discontinuation of bedrest on POD14 (3/19). Pending PT/OT evaluation. Patient supplied with SNF list per SW/TCC in anticipation of discharge to facility after completion of bed rest. Will need follow up appointment with  plastic surgery and orthopedic surgery closer to anticipated date of discharge.     Patient and plan discussed with Dr. Tata PA-C   Plastic and Reconstructive Surgery   Available via [employer] [facility], pager: [phone] or Team phones: [phone]

## 2024-03-13 NOTE — CARE PLAN
Problem: Diabetes  Goal: Achieve decreasing blood glucose levels by end of shift  Outcome: Progressing  Goal: Increase stability of blood glucose readings by end of shift  Outcome: Progressing  Goal: Decrease in ketones present in urine by end of shift  Outcome: Progressing  Goal: Maintain electrolyte levels within acceptable range throughout shift  Outcome: Progressing  Goal: Maintain glucose levels >70mg/dl to <250mg/dl throughout shift  Outcome: Progressing  Goal: No changes in neurological exam by end of shift  Outcome: Progressing  Goal: Learn about and adhere to nutrition recommendations by end of shift  Outcome: Progressing  Goal: Vital signs within normal range for age by end of shift  Outcome: Progressing  Goal: Increase self care and/or family involovement by end of shift  Outcome: Progressing  Goal: Receive DSME education by end of shift  Outcome: Progressing     Problem: Pain  Goal: Takes deep breaths with improved pain control throughout the shift  Outcome: Progressing  Goal: Turns in bed with improved pain control throughout the shift  Outcome: Progressing  Goal: Walks with improved pain control throughout the shift  Outcome: Progressing  Goal: Performs ADL's with improved pain control throughout shift  Outcome: Progressing  Goal: Participates in PT with improved pain control throughout the shift  Outcome: Progressing  Goal: Free from opioid side effects throughout the shift  Outcome: Progressing  Goal: Free from acute confusion related to pain meds throughout the shift  Outcome: Progressing     Problem: Skin  Goal: Decreased wound size/increased tissue granulation at next dressing change  Outcome: Progressing  Flowsheets (Taken 3/13/2024 5724)  Decreased wound size/increased tissue granulation at next dressing change:   Promote sleep for wound healing   Protective dressings over bony prominences  Goal: Participates in plan/prevention/treatment measures  Outcome: Progressing  Flowsheets (Taken  3/13/2024 0724)  Participates in plan/prevention/treatment measures:   Discuss with provider PT/OT consult   Elevate heels  Goal: Prevent/manage excess moisture  Outcome: Progressing  Flowsheets (Taken 3/13/2024 0724)  Prevent/manage excess moisture: Monitor for/manage infection if present  Goal: Prevent/minimize sheer/friction injuries  Outcome: Progressing  Flowsheets (Taken 3/13/2024 0724)  Prevent/minimize sheer/friction injuries:   Use pull sheet   HOB 30 degrees or less  Goal: Promote/optimize nutrition  Outcome: Progressing  Flowsheets (Taken 3/13/2024 0724)  Promote/optimize nutrition:   Offer water/supplements/favorite foods   Monitor/record intake including meals  Goal: Promote skin healing  Outcome: Progressing  Flowsheets (Taken 3/13/2024 0724)  Promote skin healing: Assess skin/pad under line(s)/device(s)   The patient's goals for the shift include  remain free from injury    The clinical goals for the shift include pt to remain HDS throughout shift.

## 2024-03-14 ENCOUNTER — APPOINTMENT (OUTPATIENT)
Dept: RADIOLOGY | Facility: CLINIC | Age: 64
End: 2024-03-14
Payer: COMMERCIAL

## 2024-03-14 LAB
GLUCOSE BLD MANUAL STRIP-MCNC: 146 MG/DL (ref 74–99)
GLUCOSE BLD MANUAL STRIP-MCNC: 146 MG/DL (ref 74–99)
GLUCOSE BLD MANUAL STRIP-MCNC: 173 MG/DL (ref 74–99)
GLUCOSE BLD MANUAL STRIP-MCNC: 173 MG/DL (ref 74–99)
LAB AP BLOCK FOR ADDITIONAL STUDIES: NORMAL
LABORATORY COMMENT REPORT: NORMAL
PATH REPORT.FINAL DX SPEC: NORMAL
PATH REPORT.GROSS SPEC: NORMAL
PATH REPORT.RELEVANT HX SPEC: NORMAL
PATH REPORT.TOTAL CANCER: NORMAL
PATHOLOGY SYNOPTIC REPORT: NORMAL

## 2024-03-14 PROCEDURE — 99231 SBSQ HOSP IP/OBS SF/LOW 25: CPT

## 2024-03-14 PROCEDURE — 82947 ASSAY GLUCOSE BLOOD QUANT: CPT

## 2024-03-14 PROCEDURE — 2500000001 HC RX 250 WO HCPCS SELF ADMINISTERED DRUGS (ALT 637 FOR MEDICARE OP): Performed by: NURSE PRACTITIONER

## 2024-03-14 PROCEDURE — 2500000001 HC RX 250 WO HCPCS SELF ADMINISTERED DRUGS (ALT 637 FOR MEDICARE OP)

## 2024-03-14 PROCEDURE — 2060000001 HC INTERMEDIATE ICU ROOM DAILY

## 2024-03-14 PROCEDURE — 99024 POSTOP FOLLOW-UP VISIT: CPT

## 2024-03-14 PROCEDURE — 2500000004 HC RX 250 GENERAL PHARMACY W/ HCPCS (ALT 636 FOR OP/ED): Performed by: NURSE PRACTITIONER

## 2024-03-14 PROCEDURE — 2500000002 HC RX 250 W HCPCS SELF ADMINISTERED DRUGS (ALT 637 FOR MEDICARE OP, ALT 636 FOR OP/ED)

## 2024-03-14 RX ORDER — ERGOCALCIFEROL 1.25 MG/1
50000 CAPSULE ORAL ONCE
Status: COMPLETED | OUTPATIENT
Start: 2024-03-15 | End: 2024-03-15

## 2024-03-14 RX ADMIN — ACETAMINOPHEN 650 MG: 325 TABLET ORAL at 06:19

## 2024-03-14 RX ADMIN — ASPIRIN 81 MG: 81 TABLET, COATED ORAL at 08:12

## 2024-03-14 RX ADMIN — ACETAMINOPHEN 650 MG: 325 TABLET ORAL at 12:01

## 2024-03-14 RX ADMIN — INSULIN LISPRO 4 UNITS: 100 INJECTION, SOLUTION INTRAVENOUS; SUBCUTANEOUS at 16:23

## 2024-03-14 RX ADMIN — INSULIN LISPRO 2 UNITS: 100 INJECTION, SOLUTION INTRAVENOUS; SUBCUTANEOUS at 08:49

## 2024-03-14 RX ADMIN — Medication 1 TABLET: at 08:12

## 2024-03-14 RX ADMIN — INSULIN LISPRO 2 UNITS: 100 INJECTION, SOLUTION INTRAVENOUS; SUBCUTANEOUS at 16:22

## 2024-03-14 RX ADMIN — OXYCODONE HYDROCHLORIDE 10 MG: 5 TABLET ORAL at 01:32

## 2024-03-14 RX ADMIN — GABAPENTIN 300 MG: 300 CAPSULE ORAL at 06:19

## 2024-03-14 RX ADMIN — INSULIN LISPRO 4 UNITS: 100 INJECTION, SOLUTION INTRAVENOUS; SUBCUTANEOUS at 12:29

## 2024-03-14 RX ADMIN — METFORMIN HYDROCHLORIDE 1000 MG: 1000 TABLET ORAL at 08:12

## 2024-03-14 RX ADMIN — ATORVASTATIN CALCIUM 40 MG: 40 TABLET, FILM COATED ORAL at 22:08

## 2024-03-14 RX ADMIN — GABAPENTIN 300 MG: 300 CAPSULE ORAL at 22:08

## 2024-03-14 RX ADMIN — INSULIN LISPRO 4 UNITS: 100 INJECTION, SOLUTION INTRAVENOUS; SUBCUTANEOUS at 08:49

## 2024-03-14 RX ADMIN — INSULIN GLARGINE 12 UNITS: 100 INJECTION, SOLUTION SUBCUTANEOUS at 22:09

## 2024-03-14 RX ADMIN — ACETAMINOPHEN 650 MG: 325 TABLET ORAL at 17:35

## 2024-03-14 RX ADMIN — METFORMIN HYDROCHLORIDE 1500 MG: 1000 TABLET ORAL at 22:08

## 2024-03-14 RX ADMIN — ENOXAPARIN SODIUM 40 MG: 100 INJECTION SUBCUTANEOUS at 22:08

## 2024-03-14 ASSESSMENT — PAIN SCALES - GENERAL
PAINLEVEL_OUTOF10: 8
PAINLEVEL_OUTOF10: 0 - NO PAIN
PAINLEVEL_OUTOF10: 3

## 2024-03-14 ASSESSMENT — COGNITIVE AND FUNCTIONAL STATUS - GENERAL
STANDING UP FROM CHAIR USING ARMS: TOTAL
CLIMB 3 TO 5 STEPS WITH RAILING: TOTAL
DRESSING REGULAR UPPER BODY CLOTHING: TOTAL
DAILY ACTIVITIY SCORE: 8
PERSONAL GROOMING: TOTAL
EATING MEALS: A LITTLE
TURNING FROM BACK TO SIDE WHILE IN FLAT BAD: TOTAL
WALKING IN HOSPITAL ROOM: TOTAL
TOILETING: TOTAL
MOBILITY SCORE: 7
HELP NEEDED FOR BATHING: TOTAL
MOVING FROM LYING ON BACK TO SITTING ON SIDE OF FLAT BED WITH BEDRAILS: A LOT
MOVING TO AND FROM BED TO CHAIR: TOTAL
DRESSING REGULAR LOWER BODY CLOTHING: TOTAL

## 2024-03-14 ASSESSMENT — PAIN - FUNCTIONAL ASSESSMENT
PAIN_FUNCTIONAL_ASSESSMENT: 0-10

## 2024-03-14 NOTE — CARE PLAN
Problem: Diabetes  Goal: Achieve decreasing blood glucose levels by end of shift  Outcome: Progressing  Goal: Increase stability of blood glucose readings by end of shift  Outcome: Progressing  Goal: Decrease in ketones present in urine by end of shift  Outcome: Progressing  Goal: Maintain electrolyte levels within acceptable range throughout shift  Outcome: Progressing  Goal: Maintain glucose levels >70mg/dl to <250mg/dl throughout shift  Outcome: Progressing  Goal: No changes in neurological exam by end of shift  Outcome: Progressing  Goal: Learn about and adhere to nutrition recommendations by end of shift  Outcome: Progressing  Goal: Vital signs within normal range for age by end of shift  Outcome: Progressing  Goal: Increase self care and/or family involovement by end of shift  Outcome: Progressing  Goal: Receive DSME education by end of shift  Outcome: Progressing     Problem: Pain  Goal: Takes deep breaths with improved pain control throughout the shift  Outcome: Progressing  Goal: Turns in bed with improved pain control throughout the shift  Outcome: Progressing  Goal: Walks with improved pain control throughout the shift  Outcome: Progressing  Goal: Performs ADL's with improved pain control throughout shift  Outcome: Progressing  Goal: Participates in PT with improved pain control throughout the shift  Outcome: Progressing  Goal: Free from opioid side effects throughout the shift  Outcome: Progressing  Goal: Free from acute confusion related to pain meds throughout the shift  Outcome: Progressing     Problem: Skin  Goal: Decreased wound size/increased tissue granulation at next dressing change  Outcome: Progressing  Flowsheets (Taken 3/14/2024 9128)  Decreased wound size/increased tissue granulation at next dressing change:   Promote sleep for wound healing   Protective dressings over bony prominences  Goal: Participates in plan/prevention/treatment measures  Outcome: Progressing  Flowsheets (Taken  3/14/2024 0728)  Participates in plan/prevention/treatment measures:   Discuss with provider PT/OT consult   Elevate heels  Goal: Prevent/manage excess moisture  Outcome: Progressing  Flowsheets (Taken 3/14/2024 0728)  Prevent/manage excess moisture:   Monitor for/manage infection if present   Moisturize dry skin  Goal: Prevent/minimize sheer/friction injuries  Outcome: Progressing  Flowsheets (Taken 3/14/2024 0728)  Prevent/minimize sheer/friction injuries:   Use pull sheet   HOB 30 degrees or less   Utilize specialty bed per algorithm  Goal: Promote/optimize nutrition  Outcome: Progressing  Flowsheets (Taken 3/14/2024 0728)  Promote/optimize nutrition:   Offer water/supplements/favorite foods   Consume > 50% meals/supplements   Monitor/record intake including meals  Goal: Promote skin healing  Outcome: Progressing  Flowsheets (Taken 3/14/2024 0728)  Promote skin healing:   Assess skin/pad under line(s)/device(s)   Protective dressings over bony prominences   The patient's goals for the shift include  remain free from falls    The clinical goals for the shift include Patient remain safe throughout shift

## 2024-03-14 NOTE — PROGRESS NOTES
Department of Plastic and Reconstructive Surgery  Daily Progress Note    Patient Name: Jason Hollins  MRN: [id number]  Date:  03/14/24     Subjective  Resting in bed in left lateral decubitus position, no complaints at this time.  Pain well controlled. Tolerating diet and endorses adequate fluid intake. FMS still in place. Denies fever, chest pain, SOB, abd pain, n/v.     Objective  Physical Exam   Constitutional: NAD.  Eyes: EOMI, clear sclera.  Head/Neck: NCAT.  Cardiovascular: RRR.  Respiratory/Thorax: Unlabored respirations on RA.  Gastrointestinal: Abdomen soft, ND/NT. FMS in place.   Musculoskeletal: NWB RLE. +SILT BLE. Intact RLE DP pulses. Compartments soft and compressible.   Neurological: A&Ox3.  Psychological: Appropriate mood and behavior.  Skin: Warm and dry, no rashes.  Extremity: Pedicle flap to right gluteal region appropriate in color. Pedicle flap warm to touch, soft and compressible. Incisional wound vac intact maintaining suction without obstruction or air leak to right gluteal/thigh region. Mild ecchymosis surrounding the flap site. ANDERSON x4 (x2 to right lower back, x2 to lateral/medial thigh) draining bloody serous drainage. Maintains lateral lying on left side, with leg flexed no greater than 20 degrees.     Assessment/Plan  Jason Hollins is a 63 y.o. male with a past medical history significant for DM II, HPL, HTN and gluteal myxoid chondrosarcoma s/p radiation. Patient taken to the OR on 3/5/2024 for wide resection of gluteal sarcoma and sciatic nerve neurolysis per Dr. Hawkins of Orthopedic Surgery followed by right gluteal reconstruction, pedicle ALT, vastus lateralus flap, pedicle gluteal and perisformis flap, pelvic closure and incisional wound vac placement per Dr. Smith of Plastic Surgery.     # S/p right gluteal reconstruction, pedicle ALT, vastus lateralus flap, pedicle gluteal and perisformis flap with incisional wound vac placement   - Serial flap assessments Q6hour per nursing with interval  checks per plastics       ·  Assess flap appearance (color, warmth, turgor) over windowed area (no doppler assessment required)        ·  Nursing to notify plastic surgery team immediately if there are any acute changes          (Including pallor, temperature change, bleeding, etc.)  - Maintain incisional wound vac to right gluteal/lateral thigh region x14 days (anticipated end date 3/19)        ·  Settings: -125mmHg low continuous suction        ·  Monitor/record vac canister output [id number]       ·  Notify plastic surgery with any concerns of leak or obstruction       ·  Anticipate removal on 3/19 while patient remains in house   - Continue ANDERSON drain care per nursing (4 drains in total: x2 lower back, x2 lateral/medial thigh)       ·  Strip drain tubing TID and PRN       ·  Monitor and record output q8h        ·  Keep drain sites C/D/I with daily drain dressing changes   - Maintain left lateral decubitus positioning to avoid pressure to flap region/incisions  - Avoid flexion of knee >20 degrees, maintain RLE in soft knee immobilizer   - Strict bedrest x14 days post op (until removal of incisional wound vac on 3/19)   - PT/OT consulted 3/11 with following restrictions:       ·  NWB RLE       ·  No ambulation       ·  Avoid flexion of knee >20 degrees       ·  No R hip flexion       ·  Okay to lay flat to work on LLE (no LLE restrictions)  - Continue use of purewick to aid in voiding and prevent contamination (Marino removed POD#1)   - Monitor VS/pulse ox [id number]   - Monitor AM CBC/RFP/Mg PRN     # Acute post op pain  - Pain well controlled per patient, continue current regimen        ·  Acetaminophen 650mg Q6 hours scheduled       ·  Discontinue IV Robaxin 1000mg Q8 hours on 3/13, may resume PO after 48 hour drug-free interval       ·  Gabapentin 300mg Q8 scheduled       ·  Oxycodone 5/10mg [id number] PRN moderate/severe pain   - S/p IV Toradol 30mg Q6 (3/7-3/10)  - Zofran 4mg PRN for opoid associated nausea  - Benadryl  25mg PRN for itching  - Monitor pain assessments [id number]     # Hx DM type II  - Diabetic diet (low carb 60 CHO), glucerna supplemental shakes and fairlife shakes   - Continue home metformin POD1 (1,000mg AM, 1,500mg PM)  - Hypoglycemic protocol in place  - Pt has not seen his PCP for diabetic mgmt in over 6 months; checks blood sugar occasionally at home, typically runs in the 180s   - Consult to inpatient endocrinology for inpatient diabetic management and possible adjustments to home diabetic regimen given persistently elevated glucose levels post operatively        ·  Glargine 12units at bedtime       ·  Lispro 4units with meals       ·  Lispro corrective scale #2 with meals   - Accuchecks TID and at bedtime     #Loose stools  - FMS placed OVN on 3/11 for loose stools 2/2 Chinese food  - No abdominal pain, fevers, n/v, elevated WBC, abnormal vitals  - Consider c.diff sample if continues to have loose stool   - Patient prefers to continue FMS for ease & cleanliness. Monitor output.   - Colace held    # Hx hyperlipidemia   - Continue Atorvastatin 40mg daily    # Hx HTN  - BP stable, does not take any home medications  - Monitor VS [id number]     # Vitamin D deficiency  - Patient takes Vitamin D2 50,000 units twice monthly (15th and 30th)  - Dose ordered for 3/15, if patient remains in house, will need dose reordered for 3/30    FEN/GI:   - Encouraged PO intake   - Monitor and replete lytes PRN  - Carb controlled diet   - Protein supplementation TID (glucerna or fairlife shakes)   - Bowel regimen: Colace 100mg BID (held) and Milk of Mag PRN    Prophylaxis:   - DVT: SQ Lovenox 40mg daily, home ASA 81mg daily, SCDs to RLE        ·  Anticipate discharge with ASA 81mg BID as per orthopedic recommendations   - Encouraged use of IS (x10 every hour while awake)    Disposition: Continue care on RNF. Will remain inpatient for continued flap monitoring and vac/drain surveillance postoperatively. Anticipate discontinuation of  bedrest on POD14 (3/19). Pending PT/OT evaluation. Patient supplied with SNF list per SW/TCC in anticipation of discharge to facility after completion of bed rest. Will need follow up appointment with plastic surgery and orthopedic surgery closer to anticipated date of discharge.     Patient and plan discussed with Dr. Stevens, PA-C  Plastic and Reconstructive Surgery  Epic chat, Pager [phone], Team phones: [phone]

## 2024-03-15 LAB
ALBUMIN SERPL BCP-MCNC: 3.6 G/DL (ref 3.4–5)
ANION GAP SERPL CALC-SCNC: 14 MMOL/L (ref 10–20)
BUN SERPL-MCNC: 17 MG/DL (ref 6–23)
CALCIUM SERPL-MCNC: 9.2 MG/DL (ref 8.6–10.6)
CHLORIDE SERPL-SCNC: 108 MMOL/L (ref 98–107)
CO2 SERPL-SCNC: 24 MMOL/L (ref 21–32)
CREAT SERPL-MCNC: 0.6 MG/DL (ref 0.5–1.3)
EGFRCR SERPLBLD CKD-EPI 2021: >90 ML/MIN/1.73M*2
ERYTHROCYTE [DISTWIDTH] IN BLOOD BY AUTOMATED COUNT: 13.8 % (ref 11.5–14.5)
GLUCOSE BLD MANUAL STRIP-MCNC: 117 MG/DL (ref 74–99)
GLUCOSE BLD MANUAL STRIP-MCNC: 137 MG/DL (ref 74–99)
GLUCOSE BLD MANUAL STRIP-MCNC: 148 MG/DL (ref 74–99)
GLUCOSE BLD MANUAL STRIP-MCNC: 160 MG/DL (ref 74–99)
GLUCOSE BLD MANUAL STRIP-MCNC: 163 MG/DL (ref 74–99)
GLUCOSE SERPL-MCNC: 146 MG/DL (ref 74–99)
HCT VFR BLD AUTO: 35.1 % (ref 41–52)
HGB BLD-MCNC: 11.2 G/DL (ref 13.5–17.5)
MCH RBC QN AUTO: 27.5 PG (ref 26–34)
MCHC RBC AUTO-ENTMCNC: 31.9 G/DL (ref 32–36)
MCV RBC AUTO: 86 FL (ref 80–100)
NRBC BLD-RTO: 0 /100 WBCS (ref 0–0)
PHOSPHATE SERPL-MCNC: 3.3 MG/DL (ref 2.5–4.9)
PLATELET # BLD AUTO: 341 X10*3/UL (ref 150–450)
POTASSIUM SERPL-SCNC: 3.8 MMOL/L (ref 3.5–5.3)
RBC # BLD AUTO: 4.08 X10*6/UL (ref 4.5–5.9)
SODIUM SERPL-SCNC: 142 MMOL/L (ref 136–145)
WBC # BLD AUTO: 6 X10*3/UL (ref 4.4–11.3)

## 2024-03-15 PROCEDURE — 2060000001 HC INTERMEDIATE ICU ROOM DAILY

## 2024-03-15 PROCEDURE — 80069 RENAL FUNCTION PANEL: CPT

## 2024-03-15 PROCEDURE — 2500000001 HC RX 250 WO HCPCS SELF ADMINISTERED DRUGS (ALT 637 FOR MEDICARE OP)

## 2024-03-15 PROCEDURE — 2500000001 HC RX 250 WO HCPCS SELF ADMINISTERED DRUGS (ALT 637 FOR MEDICARE OP): Performed by: NURSE PRACTITIONER

## 2024-03-15 PROCEDURE — 2500000002 HC RX 250 W HCPCS SELF ADMINISTERED DRUGS (ALT 637 FOR MEDICARE OP, ALT 636 FOR OP/ED)

## 2024-03-15 PROCEDURE — 2500000002 HC RX 250 W HCPCS SELF ADMINISTERED DRUGS (ALT 637 FOR MEDICARE OP, ALT 636 FOR OP/ED): Performed by: NURSE PRACTITIONER

## 2024-03-15 PROCEDURE — 82947 ASSAY GLUCOSE BLOOD QUANT: CPT

## 2024-03-15 PROCEDURE — 85027 COMPLETE CBC AUTOMATED: CPT

## 2024-03-15 PROCEDURE — 2500000004 HC RX 250 GENERAL PHARMACY W/ HCPCS (ALT 636 FOR OP/ED): Performed by: NURSE PRACTITIONER

## 2024-03-15 PROCEDURE — 99231 SBSQ HOSP IP/OBS SF/LOW 25: CPT

## 2024-03-15 PROCEDURE — 36415 COLL VENOUS BLD VENIPUNCTURE: CPT

## 2024-03-15 RX ORDER — METHOCARBAMOL 500 MG/1
500 TABLET, FILM COATED ORAL EVERY 6 HOURS SCHEDULED
Status: DISCONTINUED | OUTPATIENT
Start: 2024-03-15 | End: 2024-03-20

## 2024-03-15 RX ADMIN — ACETAMINOPHEN 650 MG: 325 TABLET ORAL at 06:14

## 2024-03-15 RX ADMIN — Medication 1 TABLET: at 08:41

## 2024-03-15 RX ADMIN — ERGOCALCIFEROL 50000 UNITS: 1.25 CAPSULE ORAL at 08:41

## 2024-03-15 RX ADMIN — METFORMIN HYDROCHLORIDE 1000 MG: 1000 TABLET ORAL at 08:42

## 2024-03-15 RX ADMIN — OXYCODONE HYDROCHLORIDE 10 MG: 5 TABLET ORAL at 00:31

## 2024-03-15 RX ADMIN — ACETAMINOPHEN 650 MG: 325 TABLET ORAL at 17:21

## 2024-03-15 RX ADMIN — INSULIN GLARGINE 12 UNITS: 100 INJECTION, SOLUTION SUBCUTANEOUS at 22:25

## 2024-03-15 RX ADMIN — ASPIRIN 81 MG: 81 TABLET, COATED ORAL at 08:41

## 2024-03-15 RX ADMIN — ENOXAPARIN SODIUM 40 MG: 100 INJECTION SUBCUTANEOUS at 20:45

## 2024-03-15 RX ADMIN — METFORMIN HYDROCHLORIDE 1500 MG: 1000 TABLET ORAL at 20:41

## 2024-03-15 RX ADMIN — ATORVASTATIN CALCIUM 40 MG: 40 TABLET, FILM COATED ORAL at 20:41

## 2024-03-15 RX ADMIN — INSULIN LISPRO 4 UNITS: 100 INJECTION, SOLUTION INTRAVENOUS; SUBCUTANEOUS at 08:42

## 2024-03-15 RX ADMIN — METHOCARBAMOL 500 MG: 500 TABLET ORAL at 12:45

## 2024-03-15 RX ADMIN — METHOCARBAMOL 500 MG: 500 TABLET ORAL at 17:22

## 2024-03-15 RX ADMIN — INSULIN LISPRO 4 UNITS: 100 INJECTION, SOLUTION INTRAVENOUS; SUBCUTANEOUS at 12:45

## 2024-03-15 RX ADMIN — OXYCODONE HYDROCHLORIDE 10 MG: 5 TABLET ORAL at 06:15

## 2024-03-15 RX ADMIN — GABAPENTIN 300 MG: 300 CAPSULE ORAL at 16:05

## 2024-03-15 RX ADMIN — INSULIN LISPRO 2 UNITS: 100 INJECTION, SOLUTION INTRAVENOUS; SUBCUTANEOUS at 08:41

## 2024-03-15 RX ADMIN — INSULIN LISPRO 4 UNITS: 100 INJECTION, SOLUTION INTRAVENOUS; SUBCUTANEOUS at 17:22

## 2024-03-15 RX ADMIN — ACETAMINOPHEN 650 MG: 325 TABLET ORAL at 12:45

## 2024-03-15 RX ADMIN — ACETAMINOPHEN 650 MG: 325 TABLET ORAL at 00:29

## 2024-03-15 RX ADMIN — GABAPENTIN 300 MG: 300 CAPSULE ORAL at 06:14

## 2024-03-15 ASSESSMENT — COGNITIVE AND FUNCTIONAL STATUS - GENERAL
MOBILITY SCORE: 7
WALKING IN HOSPITAL ROOM: TOTAL
TURNING FROM BACK TO SIDE WHILE IN FLAT BAD: TOTAL
WALKING IN HOSPITAL ROOM: TOTAL
EATING MEALS: A LITTLE
STANDING UP FROM CHAIR USING ARMS: TOTAL
PERSONAL GROOMING: A LOT
CLIMB 3 TO 5 STEPS WITH RAILING: TOTAL
PERSONAL GROOMING: TOTAL
HELP NEEDED FOR BATHING: TOTAL
EATING MEALS: A LITTLE
DRESSING REGULAR UPPER BODY CLOTHING: TOTAL
DRESSING REGULAR UPPER BODY CLOTHING: TOTAL
TOILETING: TOTAL
STANDING UP FROM CHAIR USING ARMS: TOTAL
DRESSING REGULAR LOWER BODY CLOTHING: TOTAL
MOBILITY SCORE: 6
TURNING FROM BACK TO SIDE WHILE IN FLAT BAD: TOTAL
DAILY ACTIVITIY SCORE: 9
MOVING TO AND FROM BED TO CHAIR: TOTAL
MOVING FROM LYING ON BACK TO SITTING ON SIDE OF FLAT BED WITH BEDRAILS: TOTAL
TOILETING: TOTAL
MOVING TO AND FROM BED TO CHAIR: TOTAL
DAILY ACTIVITIY SCORE: 8
MOVING FROM LYING ON BACK TO SITTING ON SIDE OF FLAT BED WITH BEDRAILS: A LOT
DRESSING REGULAR LOWER BODY CLOTHING: TOTAL
HELP NEEDED FOR BATHING: TOTAL
CLIMB 3 TO 5 STEPS WITH RAILING: TOTAL

## 2024-03-15 ASSESSMENT — PAIN SCALES - GENERAL
PAINLEVEL_OUTOF10: 4
PAINLEVEL_OUTOF10: 7
PAINLEVEL_OUTOF10: 4
PAINLEVEL_OUTOF10: 7

## 2024-03-15 ASSESSMENT — PAIN - FUNCTIONAL ASSESSMENT: PAIN_FUNCTIONAL_ASSESSMENT: 0-10

## 2024-03-15 NOTE — CARE PLAN
Problem: Diabetes  Goal: Achieve decreasing blood glucose levels by end of shift  Outcome: Progressing  Goal: Increase stability of blood glucose readings by end of shift  Outcome: Progressing  Goal: Decrease in ketones present in urine by end of shift  Outcome: Progressing  Goal: Maintain electrolyte levels within acceptable range throughout shift  Outcome: Progressing  Goal: Maintain glucose levels >70mg/dl to <250mg/dl throughout shift  Outcome: Progressing  Goal: No changes in neurological exam by end of shift  Outcome: Progressing  Goal: Learn about and adhere to nutrition recommendations by end of shift  Outcome: Progressing  Goal: Vital signs within normal range for age by end of shift  Outcome: Progressing  Goal: Increase self care and/or family involovement by end of shift  Outcome: Progressing  Goal: Receive DSME education by end of shift  Outcome: Progressing     Problem: Pain  Goal: Takes deep breaths with improved pain control throughout the shift  Outcome: Progressing  Goal: Turns in bed with improved pain control throughout the shift  Outcome: Progressing  Goal: Walks with improved pain control throughout the shift  Outcome: Progressing  Goal: Performs ADL's with improved pain control throughout shift  Outcome: Progressing  Goal: Participates in PT with improved pain control throughout the shift  Outcome: Progressing  Goal: Free from opioid side effects throughout the shift  Outcome: Progressing  Goal: Free from acute confusion related to pain meds throughout the shift  Outcome: Progressing     Problem: Skin  Goal: Decreased wound size/increased tissue granulation at next dressing change  Outcome: Progressing  Flowsheets (Taken 3/15/2024 0922)  Decreased wound size/increased tissue granulation at next dressing change: Promote sleep for wound healing  Goal: Participates in plan/prevention/treatment measures  Outcome: Progressing  Flowsheets (Taken 3/15/2024 0922)  Participates in  plan/prevention/treatment measures: Discuss with provider PT/OT consult  Goal: Prevent/manage excess moisture  Outcome: Progressing  Flowsheets (Taken 3/15/2024 0922)  Prevent/manage excess moisture:   Moisturize dry skin   Cleanse incontinence/protect with barrier cream  Goal: Prevent/minimize sheer/friction injuries  Outcome: Progressing  Flowsheets (Taken 3/15/2024 0922)  Prevent/minimize sheer/friction injuries:   Complete micro-shifts as needed if patient unable. Adjust patient position to relieve pressure points, not a full turn   Increase activity/out of bed for meals  Goal: Promote/optimize nutrition  Outcome: Progressing  Flowsheets (Taken 3/15/2024 0922)  Promote/optimize nutrition:   Offer water/supplements/favorite foods   Assist with feeding  Goal: Promote skin healing  Outcome: Progressing  Flowsheets (Taken 3/15/2024 0922)  Promote skin healing:   Turn/reposition every 2 hours/use positioning/transfer devices   Assess skin/pad under line(s)/device(s)

## 2024-03-15 NOTE — PROGRESS NOTES
"    Department of Plastic and Reconstructive Surgery  Daily Progress Note    Patient Name: Jason Hollins  MRN: [id number]  Date:  03/15/24     Subjective  Found resting in bed comfortably in left lateral decubitus position. No concerns at this time. Reports pain 8/10 after moving around in bed, but controlled with current regimen. Patient states rectal tube got clogged overnight and had to be removed. Denies any fever, chills, night sweats, CP, SOB, palpitations, nausea, vomiting, or abdominal pain/discomfort.     Overnight Events  NAOE    Objective    Vital Signs  /68   Pulse 77   Temp 36.9 °C (98.4 °F) (Temporal)   Resp 18   Ht 1.778 m (5' 10\")   Wt 101 kg (222 lb 14.2 oz)   SpO2 94%   BMI 31.98 kg/m²      Physical Exam   Constitutional: NAD.  Eyes: EOMI, clear sclera.  Head/Neck: NCAT.  Cardiovascular: RRR.  Respiratory/Thorax: Unlabored respirations on RA.  Gastrointestinal: Abdomen soft, ND/NT.   Musculoskeletal: NWB RLE. +SILT BLE. Intact RLE DP pulses. Compartments soft and compressible.   Neurological: A&Ox3.  Psychological: Appropriate mood and behavior.  Skin: Warm and dry, no rashes.  Extremity: Pedicle flap to right gluteal region appropriate in color. Pedicle flap warm to touch, soft and compressible. Incisional wound vac intact maintaining suction without obstruction or air leak to right gluteal/thigh region. Mild ecchymosis surrounding the flap site. ANDERSON x4 (x2 to right lower back, x2 to lateral/medial thigh) draining bloody serous drainage. Maintains lateral lying on left side, with leg flexed no greater than 20 degrees.   Diagnostics   Results for orders placed or performed during the hospital encounter of 03/05/24 (from the past 24 hour(s))   POCT GLUCOSE   Result Value Ref Range    POCT Glucose 146 (H) 74 - 99 mg/dL   POCT GLUCOSE   Result Value Ref Range    POCT Glucose 173 (H) 74 - 99 mg/dL   POCT GLUCOSE   Result Value Ref Range    POCT Glucose 146 (H) 74 - 99 mg/dL   POCT GLUCOSE "   Result Value Ref Range    POCT Glucose 163 (H) 74 - 99 mg/dL     XR chest 1 view    Result Date: 3/7/2024  Interpreted By:  Austin Ruiz, STUDY: XR CHEST 1 VIEW;  3/6/2024 11:00 pm   INDICATION: Signs/Symptoms:low grade fever, r/o PNA.   COMPARISON: CT chest 01/31/2024   ACCESSION NUMBER(S): [id number]   ORDERING CLINICIAN: BHARAT RANKIN   FINDINGS: AP radiograph of the chest   The patient is markedly rotated to the left. The heart is enlarged. No acute pulmonary infiltrates are noted. The left ventricle obscures the left costophrenic sulcus       1. No acute cardiopulmonary pathology       Signed by: Austin Ruiz 3/7/2024 8:09 AM Dictation workstation:   [id number]    CT angio aorta and bilateral iliofemoral runoff w and or wo IV contrast    Result Date: 2/28/2024  Interpreted By:  Elaine Maya, STUDY: CT ANGIO AORTA AND BILATERAL ILIOFEMORAL RUNOFF W AND OR WO IV CONTRAST;  2/28/2024 3:05 am   INDICATION: Signs/Symptoms:preop planning.   COMPARISON: MRI of the pelvis dated 01/31/2024   ACCESSION NUMBER(S): [id number]   ORDERING CLINICIAN: JARRED ADAMS   TECHNIQUE: Thin-section axial images of the abdomen, pelvis, bilateral lower extremities in the arterial phase after intravenous administration of lower osmolar agent  75 mL of Omnipaque 350 contrast.     CT Dose Reduction Employed: Yes, iterative reconstruction   For optimization of anatomic evaluation, multiplanar reconstruction, maximum intensity projections, and advanced 3-D off-line postprocessing were performed on a dedicated stand-alone workstation by the interpreting physician.   FINDINGS: VASCULATURE:   ABDOMINAL AORTA: No abdominal aortic aneurysm or dissection. No significant atherosclerosis. Mild tortuosity of the iliac vasculature is present. Celiac artery: No significant atherosclerotic calcification is noted along the celiac artery. The right hepatic artery appears to be originating from the SMA as a normal variant. Superior mesenteric artery:  The superior mesenteric artery appears to be widely patent. As mentioned above, the right hepatic artery is originating from the SMA as a normal variant. Right renal artery: 3 renal arteries are noted supplying the right kidney. Left renal artery: A single left-sided renal artery is noted.   The inferior mesenteric artery is widely patent.   ABDOMINAL AND PELVIC ARTERIES:   - Right Common Iliac Artery:  No atherosclerosis No hemodynamically significant stenosis. - Right External Iliac Artery:  No significant atherosclerosis No hemodynamically significant stenosis. - Right Internal Iliac Artery:  No significant atherosclerosis No hemodynamically significant stenosis. Branches of the right internal iliac artery appears to be in close proximity of the mass within the right gluteal region and likely providing supply via the inferior gluteal artery. The superior gluteal artery likely is not supplying the mass.   - Left Common Iliac Artery:  No atherosclerosis No hemodynamically significant stenosis. - Left External Iliac Artery:  No atherosclerosis No hemodynamically significant stenosis. - Left Internal Iliac Artery:  mild  atherosclerosis No hemodynamically significant stenosis.     RIGHT LOWER EXTREMITY:   - Common Femoral Artery:  Minimal atherosclerosis just before the bifurcation no hemodynamically significant stenosis. - Profunda Artery:  Minimal atherosclerosis No hemodynamically significant stenosis. Small branch of the profundus appears to be supplying the inferior aspect of the mass. - Superficial Femoral Artery:  mild atherosclerosis  No hemodynamically significant stenosis. - Popliteal Artery:  Mild-to-moderate atherosclerosis No hemodynamically significant stenosis. - Anterior Tibial:  mild atherosclerosis No hemodynamically significant stenosis. Is visualized toward the mid tibial shaft - Posterior Tibial:  Mild-to-moderate atherosclerosis No hemodynamically significant stenosis. Appears to be patent  toward the foot, the distal aspect appears to be tortuous. - Peroneal Arteries: Mild-to-moderate atherosclerosis No hemodynamically significant stenosis. Patent to foot   LEFT LOWER EXTREMITY:   - Common Femoral Artery:  Trace atherosclerosis No hemodynamically significant stenosis. - Profunda Artery:  mild atherosclerosis No hemodynamically significant stenosis. - Superficial Femoral Artery: mild atherosclerosis No hemodynamically significant stenosis. - Popliteal Artery:  mild atherosclerosis No hemodynamically significant stenosis. - Anterior Tibial:  Trace atherosclerosis No hemodynamically significant stenosis. Patent to foot - Posterior Tibial:  Mild-to-moderate atherosclerosis No hemodynamically significant stenosis. Patent to foot significant tortuosity of the artery distally at the level of the foot. - Peroneal Arteries: Mild-to-moderate atherosclerosis No hemodynamically significant stenosis. Visualized toward the mid fibular shaft   ABDOMEN AND PELVIS:   Lower chest: The visualized lung bases demonstrate no gross finding   Liver: No gross evidence of for hepatic masses given the limitation early arterial phase contrast CT   Biliary tree: No intrahepatic biliary dilatation, the common bile duct grossly unremarkable   Gallbladder: No gross abnormality   Pancreas: No gross abnormality   Spleen: No gross abnormality   Adrenal: The adrenal appears unremarkable   Kidneys: Symmetrical enhancement bilaterally is present. Presumably visualization of septated cyst in anterior aspect of the left kidney. Punctate nonobstructing stone is present in the midpole of the right kidney.   Peritoneum and retroperitoneum: No free fluid, free air and/or gross masses   Lymph nodes: Nonspecific small periaortic lymph nodes are present as well as marly hepatis, the appearance remain within normal limits.   Bowel: No abnormally dilated bowel segments are seen. No significant inflammatory changes are present. Extensive colonic  diverticulosis are seen without secondary sign of acute diverticulitis.   Bladder: Is empty and cannot be evaluated   Pelvis: The visualized prostate measures approximately 5.1 cm. No free fluid is seen in the pelvis. Scattered small nonspecific inguinal lymph nodes.   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 Please refer to detailed MRI dated 01/31/2024. The mass abutting the inferior gluteal artery and likely supplied by branches of the inferior gluteal artery. Small accessory branches of the profunda appears to be abutting the most inferior aspect of the mass.   Subcutaneous varicose veins are present in the lower extremities distally just above the feet. Bilateral varices within the feet are also present.       1. Overall, redemonstration of known right-sided gluteal mass appears to be predominantly supply of branches of the inferior gluteal artery. Small branches of the profound supplying the lower portion of the mass. 2. Peripheral vascular disease predominantly in the lower extremities, without gross evidence of significant narrowing or stenosis. Possible varicose veins distally in the bilateral lower extremities 3. Other nonspecific finding as detailed above.           Signed by: lEaine Maya 2/28/2024 10:36 AM Dictation workstation:   [id number]      Current Medications  Scheduled medications  acetaminophen, 650 mg, oral, q6h LEILA  aspirin, 81 mg, oral, Daily  atorvastatin, 40 mg, oral, Nightly  [Held by provider] docusate sodium, 100 mg, oral, BID  enoxaparin, 40 mg, subcutaneous, q24h  gabapentin, 300 mg, oral, q8h LEILA  insulin glargine, 12 Units, subcutaneous, Nightly  insulin lispro, 0-10 Units, subcutaneous, TID AC  insulin lispro, 4 Units, subcutaneous, TID with meals  metFORMIN, 1,000 mg, oral, Daily with breakfast  metFORMIN, 1,500 mg, oral, Nightly  multivitamin with minerals, 1 tablet, oral,  Daily      Continuous medications     PRN medications  PRN medications: dextrose 10 % in water (D10W), dextrose, diphenhydrAMINE, glucagon, magnesium hydroxide, naloxone, ondansetron **OR** ondansetron, oxyCODONE, oxyCODONE     Assessment  Jason Hollins is a 63 y.o. male with a past medical history significant for DM II, HPL, HTN and gluteal myxoid chondrosarcoma s/p radiation. Patient taken to the OR on 3/5/2024 for wide resection of gluteal sarcoma and sciatic nerve neurolysis per Dr. Hawkins of Orthopedic Surgery followed by right gluteal reconstruction, pedicle ALT, vastus lateralus flap, pedicle gluteal and perisformis flap, pelvic closure and incisional wound vac placement per Dr. Smith of Plastic Surgery.     Plan/Recommendations  # S/p right gluteal reconstruction, pedicle ALT, vastus lateralus flap, pedicle gluteal and perisformis flap with incisional wound vac placement   - Serial flap assessments Q6hour per nursing with interval checks per plastics       ·  Assess flap appearance (color, warmth, turgor) over windowed area (no doppler assessment required)        ·  Nursing to notify plastic surgery team immediately if there are any acute changes          (Including pallor, temperature change, bleeding, etc.)  - Maintain incisional wound vac to right gluteal/lateral thigh region x14 days (anticipated end date 3/19)        ·  Settings: -125mmHg low continuous suction        ·  Monitor/record vac canister output [id number]       ·  Notify plastic surgery with any concerns of leak or obstruction       ·  Anticipate removal on 3/19 while patient remains in house   - Continue ANDERSON drain care per nursing (4 drains in total: x2 lower back, x2 lateral/medial thigh)       ·  Strip drain tubing TID and PRN       ·  Monitor and record output q8h        ·  Keep drain sites C/D/I with daily drain dressing changes   - Maintain left lateral decubitus positioning to avoid pressure to flap region/incisions  - Avoid flexion of knee  >20 degrees, maintain RLE in soft knee immobilizer   - Strict bedrest x14 days post op (until removal of incisional wound vac on 3/19)   - PT/OT consulted 3/11 with following restrictions:       ·  NWB RLE       ·  No ambulation       ·  Avoid flexion of knee >20 degrees       ·  No R hip flexion       ·  Okay to lay flat to work on LLE (no LLE restrictions)  - Continue use of purewick to aid in voiding and prevent contamination (Marino removed POD#1)   - Monitor VS/pulse ox [id number]   - Monitor AM CBC/RFP/Mg PRN      # Acute post op pain  - Pain well controlled per patient, continue current regimen        ·  Acetaminophen 650mg Q6 hours scheduled       ·  Discontinue IV Robaxin 1000mg Q8 hours on 3/13, may resume PO after 48 hour drug-free interval              · PO Robaxin re-initiated 3/15       ·  Gabapentin 300mg Q8 scheduled       ·  Oxycodone 5/10mg [id number] PRN moderate/severe pain   - S/p IV Toradol 30mg Q6 (3/7-3/10)  - Zofran 4mg PRN for opoid associated nausea  - Benadryl 25mg PRN for itching  - Monitor pain assessments [id number]      # Hx DM type II  - Diabetic diet (low carb 60 CHO), glucerna supplemental shakes and fairlife shakes   - Continue home metformin POD1 (1,000mg AM, 1,500mg PM)  - Hypoglycemic protocol in place  - Pt has not seen his PCP for diabetic mgmt in over 6 months; checks blood sugar occasionally at home, typically runs in the 180s   - Consult to inpatient endocrinology for inpatient diabetic management and possible adjustments to home diabetic regimen given persistently elevated glucose levels post operatively        ·  Glargine 12units at bedtime       ·  Lispro 4units with meals       ·  Lispro corrective scale #2 with meals   - Accuchecks TID and at bedtime      #Loose stools  - FMS placed OVN on 3/11 for loose stools 2/2 [place] food  - No abdominal pain, fevers, n/v, elevated WBC, abnormal vitals  - Consider c.diff sample if continues to have loose stool        ·  Stool more  regular/formed 3/15, FMS removed  - Colace held     # Hx hyperlipidemia   - Continue Atorvastatin 40mg daily     # Hx HTN  - BP stable, does not take any home medications  - Monitor VS [id number]      # Vitamin D deficiency  - Patient takes Vitamin D2 50,000 units twice monthly (15th and 30th)  - Dose ordered for 3/15, if patient remains in house, will need dose reordered for 3/30     FEN/GI:   - Encouraged PO intake   - Monitor and replete lytes PRN  - Carb controlled diet   - Protein supplementation TID (glucerna or fairlife shakes)   - Bowel regimen: Colace 100mg BID (held) and Milk of Mag PRN     Prophylaxis:   - DVT: SQ Lovenox 40mg daily, home ASA 81mg daily, SCDs to RLE        ·  Anticipate discharge with ASA 81mg BID as per orthopedic recommendations   - Encouraged use of IS (x10 every hour while awake)     Disposition: Continue care on RNF. Will remain inpatient for continued flap monitoring and vac/drain surveillance postoperatively. Anticipate discontinuation of bedrest on POD14 (3/19). Pending PT/OT evaluation. Patient supplied with SNF list per SW/TCC in anticipation of discharge to facility after completion of bed rest. Will need follow up appointment with plastic surgery and orthopedic surgery closer to anticipated date of discharge.        Patient and plan discussed with Dr. Rosenbaum, PHILLIP-CNP   Plastic and Reconstructive Surgery   [facility]  Pager #[phone]  Team phones: [phone]

## 2024-03-16 LAB
GLUCOSE BLD MANUAL STRIP-MCNC: 135 MG/DL (ref 74–99)
GLUCOSE BLD MANUAL STRIP-MCNC: 146 MG/DL (ref 74–99)
GLUCOSE BLD MANUAL STRIP-MCNC: 183 MG/DL (ref 74–99)
GLUCOSE BLD MANUAL STRIP-MCNC: 190 MG/DL (ref 74–99)

## 2024-03-16 PROCEDURE — 99232 SBSQ HOSP IP/OBS MODERATE 35: CPT

## 2024-03-16 PROCEDURE — 99024 POSTOP FOLLOW-UP VISIT: CPT

## 2024-03-16 PROCEDURE — 82947 ASSAY GLUCOSE BLOOD QUANT: CPT

## 2024-03-16 PROCEDURE — 2500000001 HC RX 250 WO HCPCS SELF ADMINISTERED DRUGS (ALT 637 FOR MEDICARE OP)

## 2024-03-16 PROCEDURE — 2500000002 HC RX 250 W HCPCS SELF ADMINISTERED DRUGS (ALT 637 FOR MEDICARE OP, ALT 636 FOR OP/ED)

## 2024-03-16 PROCEDURE — 2500000004 HC RX 250 GENERAL PHARMACY W/ HCPCS (ALT 636 FOR OP/ED): Performed by: NURSE PRACTITIONER

## 2024-03-16 PROCEDURE — 2060000001 HC INTERMEDIATE ICU ROOM DAILY

## 2024-03-16 PROCEDURE — 2500000001 HC RX 250 WO HCPCS SELF ADMINISTERED DRUGS (ALT 637 FOR MEDICARE OP): Performed by: NURSE PRACTITIONER

## 2024-03-16 RX ADMIN — INSULIN GLARGINE 12 UNITS: 100 INJECTION, SOLUTION SUBCUTANEOUS at 20:38

## 2024-03-16 RX ADMIN — METHOCARBAMOL 500 MG: 500 TABLET ORAL at 12:41

## 2024-03-16 RX ADMIN — INSULIN LISPRO 2 UNITS: 100 INJECTION, SOLUTION INTRAVENOUS; SUBCUTANEOUS at 08:48

## 2024-03-16 RX ADMIN — GABAPENTIN 300 MG: 300 CAPSULE ORAL at 16:03

## 2024-03-16 RX ADMIN — METFORMIN HYDROCHLORIDE 1500 MG: 1000 TABLET ORAL at 20:30

## 2024-03-16 RX ADMIN — INSULIN LISPRO 4 UNITS: 100 INJECTION, SOLUTION INTRAVENOUS; SUBCUTANEOUS at 08:47

## 2024-03-16 RX ADMIN — METHOCARBAMOL 500 MG: 500 TABLET ORAL at 00:35

## 2024-03-16 RX ADMIN — ACETAMINOPHEN 650 MG: 325 TABLET ORAL at 18:11

## 2024-03-16 RX ADMIN — ATORVASTATIN CALCIUM 40 MG: 40 TABLET, FILM COATED ORAL at 20:30

## 2024-03-16 RX ADMIN — Medication 1 TABLET: at 08:45

## 2024-03-16 RX ADMIN — GABAPENTIN 300 MG: 300 CAPSULE ORAL at 00:35

## 2024-03-16 RX ADMIN — GABAPENTIN 300 MG: 300 CAPSULE ORAL at 08:45

## 2024-03-16 RX ADMIN — METFORMIN HYDROCHLORIDE 1000 MG: 1000 TABLET ORAL at 08:45

## 2024-03-16 RX ADMIN — ACETAMINOPHEN 650 MG: 325 TABLET ORAL at 12:41

## 2024-03-16 RX ADMIN — ACETAMINOPHEN 650 MG: 325 TABLET ORAL at 06:29

## 2024-03-16 RX ADMIN — METHOCARBAMOL 500 MG: 500 TABLET ORAL at 18:11

## 2024-03-16 RX ADMIN — ASPIRIN 81 MG: 81 TABLET, COATED ORAL at 08:46

## 2024-03-16 RX ADMIN — METHOCARBAMOL 500 MG: 500 TABLET ORAL at 06:29

## 2024-03-16 RX ADMIN — ENOXAPARIN SODIUM 40 MG: 100 INJECTION SUBCUTANEOUS at 22:08

## 2024-03-16 RX ADMIN — ACETAMINOPHEN 650 MG: 325 TABLET ORAL at 00:35

## 2024-03-16 RX ADMIN — INSULIN LISPRO 4 UNITS: 100 INJECTION, SOLUTION INTRAVENOUS; SUBCUTANEOUS at 16:03

## 2024-03-16 ASSESSMENT — COGNITIVE AND FUNCTIONAL STATUS - GENERAL
EATING MEALS: A LITTLE
TURNING FROM BACK TO SIDE WHILE IN FLAT BAD: TOTAL
TURNING FROM BACK TO SIDE WHILE IN FLAT BAD: TOTAL
MOVING FROM LYING ON BACK TO SITTING ON SIDE OF FLAT BED WITH BEDRAILS: TOTAL
DAILY ACTIVITIY SCORE: 10
PERSONAL GROOMING: A LITTLE
CLIMB 3 TO 5 STEPS WITH RAILING: TOTAL
MOVING TO AND FROM BED TO CHAIR: TOTAL
MOBILITY SCORE: 6
STANDING UP FROM CHAIR USING ARMS: TOTAL
DRESSING REGULAR LOWER BODY CLOTHING: TOTAL
DRESSING REGULAR UPPER BODY CLOTHING: TOTAL
DAILY ACTIVITIY SCORE: 9
CLIMB 3 TO 5 STEPS WITH RAILING: TOTAL
HELP NEEDED FOR BATHING: TOTAL
HELP NEEDED FOR BATHING: TOTAL
STANDING UP FROM CHAIR USING ARMS: TOTAL
MOVING FROM LYING ON BACK TO SITTING ON SIDE OF FLAT BED WITH BEDRAILS: TOTAL
MOBILITY SCORE: 6
TOILETING: TOTAL
TOILETING: TOTAL
WALKING IN HOSPITAL ROOM: TOTAL
WALKING IN HOSPITAL ROOM: TOTAL
PERSONAL GROOMING: A LOT
MOVING TO AND FROM BED TO CHAIR: TOTAL
DRESSING REGULAR LOWER BODY CLOTHING: TOTAL
EATING MEALS: A LITTLE
DRESSING REGULAR UPPER BODY CLOTHING: TOTAL

## 2024-03-16 ASSESSMENT — PAIN SCALES - GENERAL
PAINLEVEL_OUTOF10: 0 - NO PAIN
PAINLEVEL_OUTOF10: 2
PAINLEVEL_OUTOF10: 2
PAINLEVEL_OUTOF10: 0 - NO PAIN
PAINLEVEL_OUTOF10: 1

## 2024-03-16 ASSESSMENT — PAIN DESCRIPTION - LOCATION
LOCATION: LEG
LOCATION: LEG

## 2024-03-16 ASSESSMENT — PAIN - FUNCTIONAL ASSESSMENT
PAIN_FUNCTIONAL_ASSESSMENT: 0-10

## 2024-03-16 ASSESSMENT — PAIN DESCRIPTION - ORIENTATION: ORIENTATION: RIGHT

## 2024-03-16 NOTE — CARE PLAN
The patient's goals for the shift include  pain management    The clinical goals for the shift include Pt will remain HDS and free of injuy      Problem: Diabetes  Goal: Achieve decreasing blood glucose levels by end of shift  Outcome: Progressing  Goal: Increase stability of blood glucose readings by end of shift  Outcome: Progressing  Goal: Decrease in ketones present in urine by end of shift  Outcome: Progressing  Goal: Maintain electrolyte levels within acceptable range throughout shift  Outcome: Progressing  Goal: Maintain glucose levels >70mg/dl to <250mg/dl throughout shift  Outcome: Progressing  Goal: No changes in neurological exam by end of shift  Outcome: Progressing  Goal: Learn about and adhere to nutrition recommendations by end of shift  Outcome: Progressing  Goal: Vital signs within normal range for age by end of shift  Outcome: Progressing  Goal: Increase self care and/or family involovement by end of shift  Outcome: Progressing  Goal: Receive DSME education by end of shift  Outcome: Progressing     Problem: Pain  Goal: Takes deep breaths with improved pain control throughout the shift  Outcome: Progressing  Goal: Turns in bed with improved pain control throughout the shift  Outcome: Progressing  Goal: Walks with improved pain control throughout the shift  Outcome: Progressing  Goal: Performs ADL's with improved pain control throughout shift  Outcome: Progressing  Goal: Participates in PT with improved pain control throughout the shift  Outcome: Progressing  Goal: Free from opioid side effects throughout the shift  Outcome: Progressing  Goal: Free from acute confusion related to pain meds throughout the shift  Outcome: Progressing     Problem: Skin  Goal: Decreased wound size/increased tissue granulation at next dressing change  Outcome: Progressing  Flowsheets (Taken 3/16/2024 6123)  Decreased wound size/increased tissue granulation at next dressing change: Protective dressings over bony  prominences  Goal: Participates in plan/prevention/treatment measures  Outcome: Progressing  Flowsheets (Taken 3/16/2024 0935)  Participates in plan/prevention/treatment measures: Elevate heels  Goal: Prevent/manage excess moisture  Outcome: Progressing  Flowsheets (Taken 3/16/2024 0935)  Prevent/manage excess moisture: Cleanse incontinence/protect with barrier cream  Goal: Prevent/minimize sheer/friction injuries  Outcome: Progressing  Flowsheets (Taken 3/16/2024 0935)  Prevent/minimize sheer/friction injuries:   Use pull sheet   Turn/reposition every 2 hours/use positioning/transfer devices  Goal: Promote/optimize nutrition  Outcome: Progressing  Flowsheets (Taken 3/16/2024 0935)  Promote/optimize nutrition:   Monitor/record intake including meals   Consume > 50% meals/supplements  Goal: Promote skin healing  Outcome: Progressing  Flowsheets (Taken 3/16/2024 0935)  Promote skin healing:   Turn/reposition every 2 hours/use positioning/transfer devices   Assess skin/pad under line(s)/device(s)

## 2024-03-16 NOTE — PROGRESS NOTES
"    Department of Plastic and Reconstructive Surgery  Daily Progress Note    Patient Name: Jason Hollins  MRN: [id number]  Date:  03/16/24     Subjective  Found resting in bed comfortably in left lateral decubitus position. No concerns at this time. Reports pain controlled with current regimen. Denies any fever, chills, night sweats, CP, SOB, palpitations, nausea, vomiting, diarrhea, or abdominal pain/discomfort.     Overnight Events  NAOE    Objective    Vital Signs  /67 (BP Location: Right arm, Patient Position: Lying)   Pulse 82   Temp 36.7 °C (98 °F) (Temporal)   Resp 16   Ht 1.778 m (5' 10\")   Wt 101 kg (222 lb 14.2 oz)   SpO2 92%   BMI 31.98 kg/m²      Physical Exam   Constitutional: NAD.  Eyes: EOMI, clear sclera.  Head/Neck: NCAT.  Cardiovascular: RRR.  Respiratory/Thorax: Unlabored respirations on RA.  Gastrointestinal: Abdomen soft, ND/NT.   Musculoskeletal: NWB RLE. +SILT BLE. Intact RLE DP pulses. Compartments soft and compressible.   Neurological: A&Ox3.  Psychological: Appropriate mood and behavior.  Skin: Warm and dry, no rashes.  Extremity: Pedicle flap to right gluteal region appropriate in color. Pedicle flap warm to touch, soft and compressible. Incisional wound vac intact maintaining suction without obstruction or air leak to right gluteal/thigh region. Mild ecchymosis surrounding the flap site. ANDERSON x4 (x2 to right lower back, x2 to lateral/medial thigh) draining bloody serous drainage. Jps removed without issue. Maintains lateral lying on left side, with leg flexed no greater than 20 degrees.     Diagnostics   Results for orders placed or performed during the hospital encounter of 03/05/24 (from the past 24 hour(s))   POCT GLUCOSE   Result Value Ref Range    POCT Glucose 137 (H) 74 - 99 mg/dL   POCT GLUCOSE   Result Value Ref Range    POCT Glucose 117 (H) 74 - 99 mg/dL   POCT GLUCOSE   Result Value Ref Range    POCT Glucose 148 (H) 74 - 99 mg/dL   POCT GLUCOSE   Result Value Ref Range    " POCT Glucose 160 (H) 74 - 99 mg/dL   POCT GLUCOSE   Result Value Ref Range    POCT Glucose 183 (H) 74 - 99 mg/dL       Current Medications  Scheduled medications  acetaminophen, 650 mg, oral, q6h LEILA  aspirin, 81 mg, oral, Daily  atorvastatin, 40 mg, oral, Nightly  docusate sodium, 100 mg, oral, BID  enoxaparin, 40 mg, subcutaneous, q24h  gabapentin, 300 mg, oral, q8h LEILA  insulin glargine, 12 Units, subcutaneous, Nightly  insulin lispro, 0-10 Units, subcutaneous, TID AC  insulin lispro, 4 Units, subcutaneous, TID with meals  metFORMIN, 1,000 mg, oral, Daily with breakfast  metFORMIN, 1,500 mg, oral, Nightly  methocarbamol, 500 mg, oral, q6h LEILA  multivitamin with minerals, 1 tablet, oral, Daily      Continuous medications     PRN medications  PRN medications: dextrose 10 % in water (D10W), dextrose, diphenhydrAMINE, glucagon, magnesium hydroxide, naloxone, ondansetron **OR** ondansetron, oxyCODONE, oxyCODONE     Assessment  Jason Hollins is a 63 y.o. male with a past medical history significant for DM II, HPL, HTN and gluteal myxoid chondrosarcoma s/p radiation. Patient taken to the OR on 3/5/2024 for wide resection of gluteal sarcoma and sciatic nerve neurolysis per Dr. Hawkins of Orthopedic Surgery followed by right gluteal reconstruction, pedicle ALT, vastus lateralus flap, pedicle gluteal and perisformis flap, pelvic closure and incisional wound vac placement per Dr. Smith of Plastic Surgery.     Plan/Recommendations  # S/p right gluteal reconstruction, pedicle ALT, vastus lateralus flap, pedicle gluteal and perisformis flap with incisional wound vac placement   - Serial flap assessments Q6hour per nursing with interval checks per plastics       ·  Assess flap appearance (color, warmth, turgor) over windowed area (no doppler assessment required)        ·  Nursing to notify plastic surgery team immediately if there are any acute changes          (Including pallor, temperature change, bleeding, etc.)  - Maintain  incisional wound vac to right gluteal/lateral thigh region x14 days (anticipated end date 3/19)        ·  Settings: -125mmHg low continuous suction        ·  Monitor/record vac canister output [id number]       ·  Notify plastic surgery with any concerns of leak or obstruction       ·  Anticipate removal on 3/19 while patient remains in house   - S/p ANDERSON removal, monitor ANDERSON drain sites x4  - Maintain left lateral decubitus positioning to avoid pressure to flap region/incisions  - Avoid flexion of knee >20 degrees, maintain RLE in soft knee immobilizer   - Strict bedrest x14 days post op (until removal of incisional wound vac on 3/19)   - PT/OT consulted 3/11 with following restrictions:       ·  NWB RLE       ·  No ambulation       ·  Avoid flexion of knee >20 degrees       ·  No R hip flexion       ·  Okay to lay flat to work on LLE (no LLE restrictions)  - Continue use of purewick to aid in voiding and prevent contamination (Marino removed POD#1)   - Monitor VS/pulse ox [id number]   - Monitor AM CBC/RFP/Mg PRN      # Acute post op pain  - Pain well controlled per patient, continue current regimen        ·  Acetaminophen 650mg Q6 hours scheduled       ·  Discontinued IV Robaxin 1000mg Q8 hours on 3/13, may resume PO after 48 hour drug-free interval              · PO Robaxin re-initiated 3/15       ·  Gabapentin 300mg Q8 scheduled       ·  Oxycodone 5/10mg [id number] PRN moderate/severe pain   - S/p IV Toradol 30mg Q6 (3/7-3/10)  - Zofran 4mg PRN for opoid associated nausea  - Benadryl 25mg PRN for itching  - Monitor pain assessments [id number]      # Hx DM type II  - Diabetic diet (low carb 60 CHO), glucerna supplemental shakes and fairlife shakes   - Continue home metformin POD1 (1,000mg AM, 1,500mg PM)  - Hypoglycemic protocol in place  - Pt has not seen his PCP for diabetic mgmt in over 6 months; checks blood sugar occasionally at home, typically runs in the 180s   - Consult to inpatient endocrinology for inpatient diabetic  management and possible adjustments to home diabetic regimen given persistently elevated glucose levels post operatively        ·  Glargine 12units at bedtime       ·  Lispro 4units with meals       ·  Lispro corrective scale #2 with meals   - Accuchecks TID and at bedtime      #Loose stools, resolved  - FMS placed OVN on 3/11 for loose stools 2/2 [place] food  - No abdominal pain, fevers, n/v, elevated WBC, abnormal vitals  - Consider c.diff sample if continues to have loose stool        ·  Stool more regular/formed 3/15, FMS removed     # Hx hyperlipidemia   - Continue Atorvastatin 40mg daily     # Hx HTN  - BP stable, does not take any home medications  - Monitor VS [id number]      # Vitamin D deficiency  - Patient takes Vitamin D2 50,000 units twice monthly (15th and 30th)  - Dose ordered for 3/15, if patient remains in house, will need dose reordered for 3/30     FEN/GI:   - Encouraged PO intake   - Monitor and replete lytes PRN  - Carb controlled diet   - Protein supplementation TID (glucerna or fairlife shakes)   - Bowel regimen: Colace 100mg BID (held) and Milk of Mag PRN     Prophylaxis:   - DVT: SQ Lovenox 40mg daily, home ASA 81mg daily, SCDs to RLE        ·  Anticipate discharge with ASA 81mg BID as per orthopedic recommendations   - Encouraged use of IS (x10 every hour while awake)     Disposition: Continue care on RNF. Will remain inpatient for continued flap monitoring and vac/drain surveillance postoperatively. Anticipate discontinuation of bedrest on POD14 (3/19). Pending PT/OT evaluation. Patient supplied with SNF list per /TCC in anticipation of discharge to facility after completion of bed rest. Will need follow up appointment with plastic surgery and orthopedic surgery closer to anticipated date of discharge.        Patient and plan discussed with Dr. Naranjo, PA-C  Plastic and Reconstructive Surgery   [place]  Pager #[phone]  Team phones: [phone]    Addendum:  I have rounded on  the patient with Peggy Dubois PA-C, and I evaluated Mr. Hollins and discussed the discharge plan with the patient and Ms. Dubois.   The progress note has been discussed with me, and I approved it.     Angy Serra M.D.

## 2024-03-17 LAB
GLUCOSE BLD MANUAL STRIP-MCNC: 162 MG/DL (ref 74–99)
GLUCOSE BLD MANUAL STRIP-MCNC: 171 MG/DL (ref 74–99)
GLUCOSE BLD MANUAL STRIP-MCNC: 179 MG/DL (ref 74–99)
GLUCOSE BLD MANUAL STRIP-MCNC: 193 MG/DL (ref 74–99)

## 2024-03-17 PROCEDURE — 82947 ASSAY GLUCOSE BLOOD QUANT: CPT

## 2024-03-17 PROCEDURE — 2500000001 HC RX 250 WO HCPCS SELF ADMINISTERED DRUGS (ALT 637 FOR MEDICARE OP)

## 2024-03-17 PROCEDURE — 2500000004 HC RX 250 GENERAL PHARMACY W/ HCPCS (ALT 636 FOR OP/ED): Performed by: NURSE PRACTITIONER

## 2024-03-17 PROCEDURE — 99232 SBSQ HOSP IP/OBS MODERATE 35: CPT

## 2024-03-17 PROCEDURE — 2500000001 HC RX 250 WO HCPCS SELF ADMINISTERED DRUGS (ALT 637 FOR MEDICARE OP): Performed by: NURSE PRACTITIONER

## 2024-03-17 PROCEDURE — 1170000001 HC PRIVATE ONCOLOGY ROOM DAILY

## 2024-03-17 PROCEDURE — 2500000002 HC RX 250 W HCPCS SELF ADMINISTERED DRUGS (ALT 637 FOR MEDICARE OP, ALT 636 FOR OP/ED)

## 2024-03-17 RX ADMIN — ACETAMINOPHEN 650 MG: 325 TABLET ORAL at 06:23

## 2024-03-17 RX ADMIN — ENOXAPARIN SODIUM 40 MG: 100 INJECTION SUBCUTANEOUS at 20:46

## 2024-03-17 RX ADMIN — INSULIN GLARGINE 12 UNITS: 100 INJECTION, SOLUTION SUBCUTANEOUS at 20:08

## 2024-03-17 RX ADMIN — INSULIN LISPRO 4 UNITS: 100 INJECTION, SOLUTION INTRAVENOUS; SUBCUTANEOUS at 08:39

## 2024-03-17 RX ADMIN — METHOCARBAMOL 500 MG: 500 TABLET ORAL at 06:23

## 2024-03-17 RX ADMIN — METHOCARBAMOL 500 MG: 500 TABLET ORAL at 12:49

## 2024-03-17 RX ADMIN — ACETAMINOPHEN 650 MG: 325 TABLET ORAL at 17:36

## 2024-03-17 RX ADMIN — GABAPENTIN 300 MG: 300 CAPSULE ORAL at 01:06

## 2024-03-17 RX ADMIN — ATORVASTATIN CALCIUM 40 MG: 40 TABLET, FILM COATED ORAL at 20:41

## 2024-03-17 RX ADMIN — ACETAMINOPHEN 650 MG: 325 TABLET ORAL at 12:49

## 2024-03-17 RX ADMIN — INSULIN LISPRO 2 UNITS: 100 INJECTION, SOLUTION INTRAVENOUS; SUBCUTANEOUS at 12:51

## 2024-03-17 RX ADMIN — INSULIN LISPRO 2 UNITS: 100 INJECTION, SOLUTION INTRAVENOUS; SUBCUTANEOUS at 08:37

## 2024-03-17 RX ADMIN — Medication 1 TABLET: at 08:37

## 2024-03-17 RX ADMIN — METFORMIN HYDROCHLORIDE 1500 MG: 1000 TABLET ORAL at 20:41

## 2024-03-17 RX ADMIN — INSULIN LISPRO 4 UNITS: 100 INJECTION, SOLUTION INTRAVENOUS; SUBCUTANEOUS at 17:38

## 2024-03-17 RX ADMIN — GABAPENTIN 300 MG: 300 CAPSULE ORAL at 08:37

## 2024-03-17 RX ADMIN — METHOCARBAMOL 500 MG: 500 TABLET ORAL at 01:06

## 2024-03-17 RX ADMIN — METFORMIN HYDROCHLORIDE 1000 MG: 1000 TABLET ORAL at 08:37

## 2024-03-17 RX ADMIN — METHOCARBAMOL 500 MG: 500 TABLET ORAL at 17:36

## 2024-03-17 RX ADMIN — GABAPENTIN 300 MG: 300 CAPSULE ORAL at 17:36

## 2024-03-17 RX ADMIN — INSULIN LISPRO 4 UNITS: 100 INJECTION, SOLUTION INTRAVENOUS; SUBCUTANEOUS at 12:49

## 2024-03-17 RX ADMIN — INSULIN LISPRO 2 UNITS: 100 INJECTION, SOLUTION INTRAVENOUS; SUBCUTANEOUS at 17:38

## 2024-03-17 RX ADMIN — ASPIRIN 81 MG: 81 TABLET, COATED ORAL at 08:37

## 2024-03-17 RX ADMIN — ACETAMINOPHEN 650 MG: 325 TABLET ORAL at 01:06

## 2024-03-17 RX ADMIN — DOCUSATE SODIUM 100 MG: 100 CAPSULE, LIQUID FILLED ORAL at 08:37

## 2024-03-17 ASSESSMENT — PAIN - FUNCTIONAL ASSESSMENT
PAIN_FUNCTIONAL_ASSESSMENT: 0-10

## 2024-03-17 ASSESSMENT — COGNITIVE AND FUNCTIONAL STATUS - GENERAL
MOVING FROM LYING ON BACK TO SITTING ON SIDE OF FLAT BED WITH BEDRAILS: TOTAL
PERSONAL GROOMING: A LITTLE
DRESSING REGULAR LOWER BODY CLOTHING: TOTAL
DRESSING REGULAR LOWER BODY CLOTHING: TOTAL
TURNING FROM BACK TO SIDE WHILE IN FLAT BAD: TOTAL
DAILY ACTIVITIY SCORE: 10
HELP NEEDED FOR BATHING: TOTAL
DAILY ACTIVITIY SCORE: 9
DRESSING REGULAR UPPER BODY CLOTHING: TOTAL
CLIMB 3 TO 5 STEPS WITH RAILING: TOTAL
TOILETING: TOTAL
CLIMB 3 TO 5 STEPS WITH RAILING: TOTAL
WALKING IN HOSPITAL ROOM: TOTAL
TOILETING: TOTAL
DRESSING REGULAR UPPER BODY CLOTHING: TOTAL
STANDING UP FROM CHAIR USING ARMS: TOTAL
MOVING FROM LYING ON BACK TO SITTING ON SIDE OF FLAT BED WITH BEDRAILS: TOTAL
MOBILITY SCORE: 6
HELP NEEDED FOR BATHING: TOTAL
WALKING IN HOSPITAL ROOM: TOTAL
MOVING TO AND FROM BED TO CHAIR: TOTAL
PERSONAL GROOMING: A LOT
STANDING UP FROM CHAIR USING ARMS: TOTAL
TURNING FROM BACK TO SIDE WHILE IN FLAT BAD: TOTAL
MOVING TO AND FROM BED TO CHAIR: TOTAL
EATING MEALS: A LITTLE
MOBILITY SCORE: 6
EATING MEALS: A LITTLE

## 2024-03-17 ASSESSMENT — PAIN SCALES - GENERAL
PAINLEVEL_OUTOF10: 0 - NO PAIN

## 2024-03-17 NOTE — PROGRESS NOTES
Jason Hollins is a 63 y.o. male on day 12 of admission presenting with Soft tissue sarcoma (CMS/HCC).    Transitional Care Coordinator Note: Met with patient to discuss discharge planning s/p admission. Spoke with the patient's spouse who provided choices, 1. [facility], 2. [facility] 3. [facility]. Patient is currently on bedrest until 3/19. TCC team will continue to follow. Chanel Lama RN TCC via Epic.

## 2024-03-17 NOTE — CARE PLAN
The clinical goals for the shift include Pt will remain HDS and rates pain 2/10 throughout shift      Problem: Diabetes  Goal: Achieve decreasing blood glucose levels by end of shift  Outcome: Progressing  Goal: Increase stability of blood glucose readings by end of shift  Outcome: Progressing  Goal: Decrease in ketones present in urine by end of shift  Outcome: Progressing  Goal: Maintain electrolyte levels within acceptable range throughout shift  Outcome: Progressing  Goal: Maintain glucose levels >70mg/dl to <250mg/dl throughout shift  Outcome: Progressing  Goal: No changes in neurological exam by end of shift  Outcome: Progressing  Goal: Learn about and adhere to nutrition recommendations by end of shift  Outcome: Progressing  Goal: Vital signs within normal range for age by end of shift  Outcome: Progressing  Goal: Increase self care and/or family involovement by end of shift  Outcome: Progressing  Goal: Receive DSME education by end of shift  Outcome: Progressing     Problem: Pain  Goal: Takes deep breaths with improved pain control throughout the shift  Outcome: Progressing  Goal: Turns in bed with improved pain control throughout the shift  Outcome: Progressing  Goal: Walks with improved pain control throughout the shift  Outcome: Progressing  Goal: Performs ADL's with improved pain control throughout shift  Outcome: Progressing  Goal: Participates in PT with improved pain control throughout the shift  Outcome: Progressing  Goal: Free from opioid side effects throughout the shift  Outcome: Progressing  Goal: Free from acute confusion related to pain meds throughout the shift  Outcome: Progressing     Problem: Skin  Goal: Decreased wound size/increased tissue granulation at next dressing change  Outcome: Progressing  Flowsheets (Taken 3/17/2024 6374)  Decreased wound size/increased tissue granulation at next dressing change:   Protective dressings over bony prominences   Promote sleep for wound  healing  Goal: Participates in plan/prevention/treatment measures  Outcome: Progressing  Flowsheets (Taken 3/17/2024 1856)  Participates in plan/prevention/treatment measures:   Elevate heels   Increase activity/out of bed for meals  Goal: Prevent/manage excess moisture  Outcome: Progressing  Flowsheets (Taken 3/17/2024 1856)  Prevent/manage excess moisture:   Cleanse incontinence/protect with barrier cream   Monitor for/manage infection if present   Moisturize dry skin  Goal: Prevent/minimize sheer/friction injuries  Outcome: Progressing  Flowsheets (Taken 3/17/2024 1856)  Prevent/minimize sheer/friction injuries:   HOB 30 degrees or less   Increase activity/out of bed for meals   Turn/reposition every 2 hours/use positioning/transfer devices   Use pull sheet  Goal: Promote/optimize nutrition  Outcome: Progressing  Flowsheets (Taken 3/17/2024 1856)  Promote/optimize nutrition:   Monitor/record intake including meals   Offer water/supplements/favorite foods   Consume > 50% meals/supplements  Goal: Promote skin healing  Outcome: Progressing  Flowsheets (Taken 3/17/2024 1856)  Promote skin healing:   Protective dressings over bony prominences   Rotate device position/do not position patient on device   Turn/reposition every 2 hours/use positioning/transfer devices   Assess skin/pad under line(s)/device(s)

## 2024-03-17 NOTE — CARE PLAN
The patient's goals for the shift include  no diarrhea overnight    The clinical goals for the shift include pain management and pt remaining HDS with good perfusion Flap

## 2024-03-17 NOTE — PROGRESS NOTES
"    Department of Plastic and Reconstructive Surgery  Daily Progress Note    Patient Name: Jason Hollins  MRN: [id number]  Date:  03/17/24     Subjective  Found resting in bed comfortably in left lateral decubitus position. No concerns at this time. Reports pain controlled with current regimen. Denies any fever, chills, night sweats, CP, SOB, palpitations, nausea, vomiting, diarrhea, or abdominal pain/discomfort.     Overnight Events  NAOE    Objective    Vital Signs  /68   Pulse 78   Temp 36.6 °C (97.9 °F)   Resp 18   Ht 1.778 m (5' 10\")   Wt 101 kg (222 lb 14.2 oz)   SpO2 95%   BMI 31.98 kg/m²      Physical Exam   Constitutional: NAD.  Eyes: EOMI, clear sclera.  Head/Neck: NCAT.  Cardiovascular: RRR.  Respiratory/Thorax: Unlabored respirations on RA.  Gastrointestinal: Abdomen soft, ND/NT.   Musculoskeletal: NWB RLE. +SILT BLE. Intact RLE DP pulses. Compartments soft and compressible.   Neurological: A&Ox3.  Psychological: Appropriate mood and behavior.  Skin: Warm and dry, no rashes.  Extremity: Pedicle flap to right gluteal region appropriate in color. Pedicle flap warm to touch, soft and compressible. Incisional wound vac intact maintaining suction without obstruction or air leak to right gluteal/thigh region. Mild ecchymosis surrounding the flap site. Maintains lateral lying on left side, with leg flexed no greater than 20 degrees.     Diagnostics   Results for orders placed or performed during the hospital encounter of 03/05/24 (from the past 24 hour(s))   POCT GLUCOSE   Result Value Ref Range    POCT Glucose 135 (H) 74 - 99 mg/dL   POCT GLUCOSE   Result Value Ref Range    POCT Glucose 146 (H) 74 - 99 mg/dL   POCT GLUCOSE   Result Value Ref Range    POCT Glucose 190 (H) 74 - 99 mg/dL   POCT GLUCOSE   Result Value Ref Range    POCT Glucose 162 (H) 74 - 99 mg/dL       Current Medications  Scheduled medications  acetaminophen, 650 mg, oral, q6h LEILA  aspirin, 81 mg, oral, Daily  atorvastatin, 40 mg, oral, " Nightly  docusate sodium, 100 mg, oral, BID  enoxaparin, 40 mg, subcutaneous, q24h  gabapentin, 300 mg, oral, q8h LEILA  insulin glargine, 12 Units, subcutaneous, Nightly  insulin lispro, 0-10 Units, subcutaneous, TID AC  insulin lispro, 4 Units, subcutaneous, TID with meals  metFORMIN, 1,000 mg, oral, Daily with breakfast  metFORMIN, 1,500 mg, oral, Nightly  methocarbamol, 500 mg, oral, q6h LEILA  multivitamin with minerals, 1 tablet, oral, Daily      Continuous medications     PRN medications  PRN medications: dextrose 10 % in water (D10W), dextrose, diphenhydrAMINE, glucagon, magnesium hydroxide, naloxone, ondansetron **OR** ondansetron, oxyCODONE, oxyCODONE     Assessment  Jason Hollins is a 63 y.o. male with a past medical history significant for DM II, HPL, HTN and gluteal myxoid chondrosarcoma s/p radiation. Patient taken to the OR on 3/5/2024 for wide resection of gluteal sarcoma and sciatic nerve neurolysis per Dr. Hawkins of Orthopedic Surgery followed by right gluteal reconstruction, pedicle ALT, vastus lateralus flap, pedicle gluteal and perisformis flap, pelvic closure and incisional wound vac placement per Dr. Smith of Plastic Surgery.     Plan/Recommendations  # S/p right gluteal reconstruction, pedicle ALT, vastus lateralus flap, pedicle gluteal and perisformis flap with incisional wound vac placement   - Serial flap assessments Q6hour per nursing with interval checks per plastics       ·  Assess flap appearance (color, warmth, turgor) over windowed area (no doppler assessment required)        ·  Nursing to notify plastic surgery team immediately if there are any acute changes          (Including pallor, temperature change, bleeding, etc.)  - Maintain incisional wound vac to right gluteal/lateral thigh region x14 days (anticipated end date 3/19)        ·  Settings: -125mmHg low continuous suction        ·  Monitor/record vac canister output [id number]       ·  Notify plastic surgery with any concerns of leak  or obstruction       ·  Anticipate removal on 3/19 while patient remains in house   - Maintain left lateral decubitus positioning to avoid pressure to flap region/incisions  - Avoid flexion of knee >20 degrees, maintain RLE in soft knee immobilizer   - Strict bedrest x14 days post op (until removal of incisional wound vac on 3/19)   - PT/OT consulted 3/11 with following restrictions:       ·  NWB RLE       ·  No ambulation       ·  Avoid flexion of knee >20 degrees       ·  No R hip flexion       ·  Okay to lay flat to work on LLE (no LLE restrictions)  - Continue use of purewick to aid in voiding and prevent contamination (Marino removed POD#1)   - Monitor VS/pulse ox [id number]   - Monitor AM CBC/RFP/Mg PRN      # Acute post op pain  - Pain well controlled per patient, continue current regimen        ·  Acetaminophen 650mg Q6 hours scheduled       ·  Discontinued IV Robaxin 1000mg Q8 hours on 3/13, may resume PO after 48 hour drug-free interval              · PO Robaxin re-initiated 3/15       ·  Gabapentin 300mg Q8 scheduled       ·  Oxycodone 5/10mg [id number] PRN moderate/severe pain   - S/p IV Toradol 30mg Q6 (3/7-3/10)  - Zofran 4mg PRN for opoid associated nausea  - Benadryl 25mg PRN for itching  - Monitor pain assessments [id number]      # Hx DM type II  - Diabetic diet (low carb 60 CHO), glucerna supplemental shakes and fairlife shakes   - Continue home metformin POD1 (1,000mg AM, 1,500mg PM)  - Hypoglycemic protocol in place  - Pt has not seen his PCP for diabetic mgmt in over 6 months; checks blood sugar occasionally at home, typically runs in the 180s   - Consult to inpatient endocrinology for inpatient diabetic management and possible adjustments to home diabetic regimen given persistently elevated glucose levels post operatively        ·  Glargine 12units at bedtime       ·  Lispro 4units with meals       ·  Lispro corrective scale #2 with meals   - Accuchecks TID and at bedtime      #Loose stools, resolved  -  FMS placed OVN on 3/11 for loose stools 2/2 [place] food  - No abdominal pain, fevers, n/v, elevated WBC, abnormal vitals  - Consider c.diff sample if continues to have loose stool        ·  Stool more regular/formed 3/15, FMS removed     # Hx hyperlipidemia   - Continue Atorvastatin 40mg daily     # Hx HTN  - BP stable, does not take any home medications  - Monitor VS [id number]      # Vitamin D deficiency  - Patient takes Vitamin D2 50,000 units twice monthly (15th and 30th)  - Dose ordered for 3/15, if patient remains in house, will need dose reordered for 3/30     FEN/GI:   - Encouraged PO intake   - Monitor and replete lytes PRN  - Carb controlled diet   - Protein supplementation TID (glucerna or fairlife shakes)   - Bowel regimen: Colace 100mg BID and Milk of Mag PRN     Prophylaxis:   - DVT: SQ Lovenox 40mg daily, home ASA 81mg daily, SCDs to RLE        ·  Anticipate discharge with ASA 81mg BID as per orthopedic recommendations   - Encouraged use of IS (x10 every hour while awake)     Disposition: Continue care on RNF. Will remain inpatient for continued flap monitoring and vac/drain surveillance postoperatively. Anticipate discontinuation of bedrest on POD14 (3/19). Pending PT/OT evaluation. Patient supplied with SNF list per SW/TCC in anticipation of discharge to facility after completion of bed rest. Will need follow up appointment with plastic surgery and orthopedic surgery closer to anticipated date of discharge.        Patient and plan discussed with Dr. Naranjo, PA-C  Plastic and Reconstructive Surgery   [place]  Pager #[phone]  Team phones: [phone]

## 2024-03-18 LAB
GLUCOSE BLD MANUAL STRIP-MCNC: 134 MG/DL (ref 74–99)
GLUCOSE BLD MANUAL STRIP-MCNC: 151 MG/DL (ref 74–99)
GLUCOSE BLD MANUAL STRIP-MCNC: 155 MG/DL (ref 74–99)
GLUCOSE BLD MANUAL STRIP-MCNC: 180 MG/DL (ref 74–99)

## 2024-03-18 PROCEDURE — 2500000001 HC RX 250 WO HCPCS SELF ADMINISTERED DRUGS (ALT 637 FOR MEDICARE OP): Performed by: NURSE PRACTITIONER

## 2024-03-18 PROCEDURE — 2500000001 HC RX 250 WO HCPCS SELF ADMINISTERED DRUGS (ALT 637 FOR MEDICARE OP)

## 2024-03-18 PROCEDURE — 2500000004 HC RX 250 GENERAL PHARMACY W/ HCPCS (ALT 636 FOR OP/ED): Performed by: NURSE PRACTITIONER

## 2024-03-18 PROCEDURE — 82947 ASSAY GLUCOSE BLOOD QUANT: CPT

## 2024-03-18 PROCEDURE — 1170000001 HC PRIVATE ONCOLOGY ROOM DAILY

## 2024-03-18 PROCEDURE — 99232 SBSQ HOSP IP/OBS MODERATE 35: CPT | Performed by: PHYSICIAN ASSISTANT

## 2024-03-18 RX ADMIN — GABAPENTIN 300 MG: 300 CAPSULE ORAL at 09:00

## 2024-03-18 RX ADMIN — METFORMIN HYDROCHLORIDE 1500 MG: 1000 TABLET ORAL at 21:39

## 2024-03-18 RX ADMIN — ATORVASTATIN CALCIUM 40 MG: 40 TABLET, FILM COATED ORAL at 21:38

## 2024-03-18 RX ADMIN — METHOCARBAMOL 500 MG: 500 TABLET ORAL at 05:51

## 2024-03-18 RX ADMIN — INSULIN GLARGINE 12 UNITS: 100 INJECTION, SOLUTION SUBCUTANEOUS at 21:40

## 2024-03-18 RX ADMIN — DOCUSATE SODIUM 100 MG: 100 CAPSULE, LIQUID FILLED ORAL at 21:39

## 2024-03-18 RX ADMIN — Medication 1 TABLET: at 09:00

## 2024-03-18 RX ADMIN — ACETAMINOPHEN 650 MG: 325 TABLET ORAL at 17:33

## 2024-03-18 RX ADMIN — GABAPENTIN 300 MG: 300 CAPSULE ORAL at 17:34

## 2024-03-18 RX ADMIN — ENOXAPARIN SODIUM 40 MG: 100 INJECTION SUBCUTANEOUS at 21:38

## 2024-03-18 RX ADMIN — METFORMIN HYDROCHLORIDE 1000 MG: 1000 TABLET ORAL at 09:00

## 2024-03-18 RX ADMIN — ACETAMINOPHEN 650 MG: 325 TABLET ORAL at 05:51

## 2024-03-18 RX ADMIN — ACETAMINOPHEN 650 MG: 325 TABLET ORAL at 00:14

## 2024-03-18 RX ADMIN — ACETAMINOPHEN 650 MG: 325 TABLET ORAL at 12:03

## 2024-03-18 RX ADMIN — GABAPENTIN 300 MG: 300 CAPSULE ORAL at 00:14

## 2024-03-18 RX ADMIN — METHOCARBAMOL 500 MG: 500 TABLET ORAL at 17:33

## 2024-03-18 RX ADMIN — ASPIRIN 81 MG: 81 TABLET, COATED ORAL at 09:00

## 2024-03-18 RX ADMIN — METHOCARBAMOL 500 MG: 500 TABLET ORAL at 00:14

## 2024-03-18 RX ADMIN — METHOCARBAMOL 500 MG: 500 TABLET ORAL at 12:03

## 2024-03-18 RX ADMIN — DOCUSATE SODIUM 100 MG: 100 CAPSULE, LIQUID FILLED ORAL at 09:00

## 2024-03-18 ASSESSMENT — COGNITIVE AND FUNCTIONAL STATUS - GENERAL
DRESSING REGULAR LOWER BODY CLOTHING: TOTAL
WALKING IN HOSPITAL ROOM: TOTAL
PERSONAL GROOMING: TOTAL
MOBILITY SCORE: 7
DRESSING REGULAR UPPER BODY CLOTHING: TOTAL
TURNING FROM BACK TO SIDE WHILE IN FLAT BAD: TOTAL
EATING MEALS: A LITTLE
HELP NEEDED FOR BATHING: TOTAL
EATING MEALS: TOTAL
MOBILITY SCORE: 6
DAILY ACTIVITIY SCORE: 13
DAILY ACTIVITIY SCORE: 6
DRESSING REGULAR UPPER BODY CLOTHING: A LOT
MOVING FROM LYING ON BACK TO SITTING ON SIDE OF FLAT BED WITH BEDRAILS: A LOT
DRESSING REGULAR LOWER BODY CLOTHING: TOTAL
MOVING TO AND FROM BED TO CHAIR: TOTAL
STANDING UP FROM CHAIR USING ARMS: TOTAL
STANDING UP FROM CHAIR USING ARMS: TOTAL
MOVING FROM LYING ON BACK TO SITTING ON SIDE OF FLAT BED WITH BEDRAILS: TOTAL
CLIMB 3 TO 5 STEPS WITH RAILING: TOTAL
HELP NEEDED FOR BATHING: A LOT
PERSONAL GROOMING: A LITTLE
TOILETING: TOTAL
TURNING FROM BACK TO SIDE WHILE IN FLAT BAD: TOTAL
CLIMB 3 TO 5 STEPS WITH RAILING: TOTAL
MOVING TO AND FROM BED TO CHAIR: TOTAL
WALKING IN HOSPITAL ROOM: TOTAL
TOILETING: A LOT

## 2024-03-18 ASSESSMENT — PAIN - FUNCTIONAL ASSESSMENT: PAIN_FUNCTIONAL_ASSESSMENT: 0-10

## 2024-03-18 ASSESSMENT — PAIN SCALES - GENERAL
PAINLEVEL_OUTOF10: 0 - NO PAIN
PAINLEVEL_OUTOF10: 2

## 2024-03-18 NOTE — PROGRESS NOTES
"    Department of Plastic and Reconstructive Surgery  Daily Progress Note    Patient Name: Jason Hollins  MRN: [id number]  Date:  03/18/24     Subjective  Resting comfortably in bed in left lateral decubitus position. No concerns at this time. Reports pain controlled with current regimen. Aware of plans to discontinue bedrest tomorrow and work with PT. Denies any fever, chills, night sweats, CP, SOB, palpitations, nausea, vomiting, diarrhea, or abdominal pain/discomfort.     Objective    Vital Signs  /57 (BP Location: Right arm, Patient Position: Lying)   Pulse 70   Temp 36.2 °C (97.2 °F) (Temporal)   Resp 16   Ht 1.778 m (5' 10\")   Wt 101 kg (222 lb 14.2 oz)   SpO2 96%   BMI 31.98 kg/m²      Physical Exam   Constitutional: NAD.  Eyes: EOMI, clear sclera.  Head/Neck: NCAT.  Cardiovascular: RRR.  Respiratory/Thorax: Unlabored respirations on RA.  Gastrointestinal: Abdomen soft, ND/NT.   Musculoskeletal: NWB RLE. +SILT BLE. Intact RLE DP pulses. Compartments soft and compressible.   Neurological: A&Ox3.  Psychological: Appropriate mood and behavior.  Skin: Warm and dry, no rashes.  Extremity: Pedicle flap to right gluteal region appropriate in color. Pedicle flap warm to touch, soft and compressible. Incisional wound vac intact maintaining suction without obstruction or air leak to right gluteal/thigh region. Mild ecchymosis surrounding the flap site. Maintains lateral lying on left side, with leg flexed no greater than 20 degrees.     Diagnostics   Results for orders placed or performed during the hospital encounter of 03/05/24 (from the past 24 hour(s))   POCT GLUCOSE   Result Value Ref Range    POCT Glucose 162 (H) 74 - 99 mg/dL   POCT GLUCOSE   Result Value Ref Range    POCT Glucose 171 (H) 74 - 99 mg/dL   POCT GLUCOSE   Result Value Ref Range    POCT Glucose 193 (H) 74 - 99 mg/dL   POCT GLUCOSE   Result Value Ref Range    POCT Glucose 179 (H) 74 - 99 mg/dL       Current Medications  Scheduled " medications  acetaminophen, 650 mg, oral, q6h LEILA  aspirin, 81 mg, oral, Daily  atorvastatin, 40 mg, oral, Nightly  docusate sodium, 100 mg, oral, BID  enoxaparin, 40 mg, subcutaneous, q24h  gabapentin, 300 mg, oral, q8h LEILA  insulin glargine, 12 Units, subcutaneous, Nightly  insulin lispro, 0-10 Units, subcutaneous, TID AC  insulin lispro, 4 Units, subcutaneous, TID with meals  metFORMIN, 1,000 mg, oral, Daily with breakfast  metFORMIN, 1,500 mg, oral, Nightly  methocarbamol, 500 mg, oral, q6h LEILA  multivitamin with minerals, 1 tablet, oral, Daily      Continuous medications     PRN medications  PRN medications: dextrose 10 % in water (D10W), dextrose, diphenhydrAMINE, glucagon, magnesium hydroxide, naloxone, ondansetron **OR** ondansetron, oxyCODONE, oxyCODONE     Assessment  Jason Hollins is a 63 y.o. male with a past medical history significant for DM II, HPL, HTN and gluteal myxoid chondrosarcoma s/p radiation. Patient taken to the OR on 3/5/2024 for wide resection of gluteal sarcoma and sciatic nerve neurolysis per Dr. Hawkins of Orthopedic Surgery followed by right gluteal reconstruction, pedicle ALT, vastus lateralus flap, pedicle gluteal and perisformis flap, pelvic closure and incisional wound vac placement per Dr. Smith of Plastic Surgery.     Plan/Recommendations  # S/p right gluteal reconstruction, pedicle ALT, vastus lateralus flap, pedicle gluteal and perisformis flap with incisional wound vac placement   - Serial flap assessments Q6hour per nursing with interval checks per plastics       ·  Assess flap appearance (color, warmth, turgor) over windowed area (no doppler assessment required)        ·  Nursing to notify plastic surgery team immediately if there are any acute changes          (Including pallor, temperature change, bleeding, etc.)  - Maintain incisional wound vac to right gluteal/lateral thigh region x14 days (anticipated end date 3/19)        ·  Settings: -125mmHg low continuous suction         ·  Monitor/record vac canister output [id number]       ·  Notify plastic surgery with any concerns of leak or obstruction       ·  Anticipate removal on 3/19 while patient remains in house   - Maintain left lateral decubitus positioning to avoid pressure to flap region/incisions  - Avoid flexion of knee >20 degrees, maintain RLE in soft knee immobilizer   - Strict bedrest x14 days post op (until removal of incisional wound vac on 3/19)   - PT/OT consulted 3/11 with following restrictions:       ·  NWB RLE       ·  No ambulation       ·  Avoid flexion of knee >20 degrees       ·  No R hip flexion       ·  Okay to lay flat to work on LLE (no LLE restrictions)       ·  Will re-engage PT on 3/19 when bedrest order discontinued   - Continue use of purewick to aid in voiding and prevent contamination (Marino removed POD#1)   - Monitor VS/pulse ox [id number]   - Monitor AM CBC/RFP/Mg PRN      # Acute post op pain  - Pain well controlled per patient, continue current regimen        ·  Acetaminophen 650mg Q6 hours scheduled       ·  PO Robaxin re-initiated 3/15       ·  Gabapentin 300mg Q8 scheduled       ·  Oxycodone 5/10mg [id number] PRN moderate/severe pain   - S/p IV Toradol 30mg Q6 (3/7-3/10)  - Zofran 4mg PRN for opoid associated nausea  - Benadryl 25mg PRN for itching  - Monitor pain assessments [id number]      # Hx DM type II  - Diabetic diet (low carb 60 CHO), glucerna supplemental shakes and fairlife shakes   - Continue home metformin POD1 (1,000mg AM, 1,500mg PM)  - Hypoglycemic protocol in place  - Pt has not seen his PCP for diabetic mgmt in over 6 months; checks blood sugar occasionally at home, typically runs in the 180s   - Consult to inpatient endocrinology for inpatient diabetic management and possible adjustments to home diabetic regimen given persistently elevated glucose levels post operatively        ·  Glargine 12units at bedtime       ·  Lispro 4units with meals       ·  Lispro corrective scale #2 with meals   -  Accuchecks TID and at bedtime      #Loose stools, resolved  - FMS placed OVN on 3/11 for loose stools 2/2 Chinese food  - Removed 3/15 as stool more regular/formed    # Hx hyperlipidemia   - Continue Atorvastatin 40mg daily     # Hx HTN  - BP stable, does not take any home medications  - Monitor VS [id number]      # Vitamin D deficiency  - Patient takes Vitamin D2 50,000 units twice monthly (15th and 30th)  - Dose ordered for 3/15, if patient remains in house, will need dose reordered for 3/30     FEN/GI:   - Encouraged PO intake   - Monitor and replete lytes PRN  - Carb controlled diet   - Protein supplementation TID (glucerna or fairlife shakes)   - Bowel regimen: Colace 100mg BID and Milk of Mag PRN     Prophylaxis:   - DVT: SQ Lovenox 40mg daily, home ASA 81mg daily, SCDs to RLE        ·  Anticipate discharge with ASA 81mg BID as per orthopedic recommendations   - Encouraged use of IS (x10 every hour while awake)     Disposition: Continue care on RNF. Will remain inpatient for continued flap monitoring and vac/drain surveillance postoperatively. Anticipate discontinuation of bedrest on POD14 (3/19). Pending PT/OT evaluation. Patient supplied with SNF list per SW/TCC in anticipation of discharge to facility after completion of bed rest. Will need follow up appointment with plastic surgery and orthopedic surgery closer to anticipated date of discharge.      Patient and plan discussed with MALLY HinkleC   Plastic and Reconstructive Surgery   Available via [employer], pager: [phone] or Team phones: [phone]

## 2024-03-18 NOTE — CARE PLAN
The patient's goals for the shift include  positive flap assessment    The clinical goals for the shift include positive flap assessment and pt to remain HDS with no diarrhea episodes overnight

## 2024-03-19 LAB
GLUCOSE BLD MANUAL STRIP-MCNC: 121 MG/DL (ref 74–99)
GLUCOSE BLD MANUAL STRIP-MCNC: 158 MG/DL (ref 74–99)
GLUCOSE BLD MANUAL STRIP-MCNC: 167 MG/DL (ref 74–99)
GLUCOSE BLD MANUAL STRIP-MCNC: 216 MG/DL (ref 74–99)

## 2024-03-19 PROCEDURE — 82947 ASSAY GLUCOSE BLOOD QUANT: CPT

## 2024-03-19 PROCEDURE — 2500000001 HC RX 250 WO HCPCS SELF ADMINISTERED DRUGS (ALT 637 FOR MEDICARE OP)

## 2024-03-19 PROCEDURE — 97165 OT EVAL LOW COMPLEX 30 MIN: CPT | Mod: GO

## 2024-03-19 PROCEDURE — 99233 SBSQ HOSP IP/OBS HIGH 50: CPT | Performed by: PHYSICIAN ASSISTANT

## 2024-03-19 PROCEDURE — 2500000002 HC RX 250 W HCPCS SELF ADMINISTERED DRUGS (ALT 637 FOR MEDICARE OP, ALT 636 FOR OP/ED): Performed by: NURSE PRACTITIONER

## 2024-03-19 PROCEDURE — 97161 PT EVAL LOW COMPLEX 20 MIN: CPT | Mod: GP

## 2024-03-19 PROCEDURE — 97110 THERAPEUTIC EXERCISES: CPT | Mod: GO

## 2024-03-19 PROCEDURE — 97530 THERAPEUTIC ACTIVITIES: CPT | Mod: GP

## 2024-03-19 PROCEDURE — 1170000001 HC PRIVATE ONCOLOGY ROOM DAILY

## 2024-03-19 PROCEDURE — 2500000002 HC RX 250 W HCPCS SELF ADMINISTERED DRUGS (ALT 637 FOR MEDICARE OP, ALT 636 FOR OP/ED)

## 2024-03-19 PROCEDURE — 2500000001 HC RX 250 WO HCPCS SELF ADMINISTERED DRUGS (ALT 637 FOR MEDICARE OP): Performed by: NURSE PRACTITIONER

## 2024-03-19 PROCEDURE — 2500000004 HC RX 250 GENERAL PHARMACY W/ HCPCS (ALT 636 FOR OP/ED): Performed by: NURSE PRACTITIONER

## 2024-03-19 RX ADMIN — ACETAMINOPHEN 650 MG: 325 TABLET ORAL at 12:17

## 2024-03-19 RX ADMIN — INSULIN LISPRO 2 UNITS: 100 INJECTION, SOLUTION INTRAVENOUS; SUBCUTANEOUS at 17:06

## 2024-03-19 RX ADMIN — ENOXAPARIN SODIUM 40 MG: 100 INJECTION SUBCUTANEOUS at 22:01

## 2024-03-19 RX ADMIN — ATORVASTATIN CALCIUM 40 MG: 40 TABLET, FILM COATED ORAL at 22:01

## 2024-03-19 RX ADMIN — INSULIN GLARGINE 12 UNITS: 100 INJECTION, SOLUTION SUBCUTANEOUS at 22:19

## 2024-03-19 RX ADMIN — ACETAMINOPHEN 650 MG: 325 TABLET ORAL at 17:05

## 2024-03-19 RX ADMIN — GABAPENTIN 300 MG: 300 CAPSULE ORAL at 00:51

## 2024-03-19 RX ADMIN — GABAPENTIN 300 MG: 300 CAPSULE ORAL at 08:34

## 2024-03-19 RX ADMIN — METFORMIN HYDROCHLORIDE 1000 MG: 1000 TABLET ORAL at 08:34

## 2024-03-19 RX ADMIN — DOCUSATE SODIUM 100 MG: 100 CAPSULE, LIQUID FILLED ORAL at 08:34

## 2024-03-19 RX ADMIN — INSULIN LISPRO 4 UNITS: 100 INJECTION, SOLUTION INTRAVENOUS; SUBCUTANEOUS at 17:08

## 2024-03-19 RX ADMIN — INSULIN LISPRO 4 UNITS: 100 INJECTION, SOLUTION INTRAVENOUS; SUBCUTANEOUS at 08:36

## 2024-03-19 RX ADMIN — METHOCARBAMOL 500 MG: 500 TABLET ORAL at 17:05

## 2024-03-19 RX ADMIN — METHOCARBAMOL 500 MG: 500 TABLET ORAL at 12:17

## 2024-03-19 RX ADMIN — GABAPENTIN 300 MG: 300 CAPSULE ORAL at 17:05

## 2024-03-19 RX ADMIN — ACETAMINOPHEN 650 MG: 325 TABLET ORAL at 00:51

## 2024-03-19 RX ADMIN — ASPIRIN 81 MG: 81 TABLET, COATED ORAL at 08:34

## 2024-03-19 RX ADMIN — METHOCARBAMOL 500 MG: 500 TABLET ORAL at 00:51

## 2024-03-19 RX ADMIN — ACETAMINOPHEN 650 MG: 325 TABLET ORAL at 06:08

## 2024-03-19 RX ADMIN — Medication 1 TABLET: at 08:34

## 2024-03-19 RX ADMIN — INSULIN LISPRO 2 UNITS: 100 INJECTION, SOLUTION INTRAVENOUS; SUBCUTANEOUS at 08:34

## 2024-03-19 RX ADMIN — INSULIN LISPRO 4 UNITS: 100 INJECTION, SOLUTION INTRAVENOUS; SUBCUTANEOUS at 12:20

## 2024-03-19 RX ADMIN — METHOCARBAMOL 500 MG: 500 TABLET ORAL at 06:08

## 2024-03-19 ASSESSMENT — COGNITIVE AND FUNCTIONAL STATUS - GENERAL
WALKING IN HOSPITAL ROOM: TOTAL
TURNING FROM BACK TO SIDE WHILE IN FLAT BAD: TOTAL
MOBILITY SCORE: 8
TOILETING: A LOT
CLIMB 3 TO 5 STEPS WITH RAILING: TOTAL
TURNING FROM BACK TO SIDE WHILE IN FLAT BAD: TOTAL
STANDING UP FROM CHAIR USING ARMS: TOTAL
MOVING FROM LYING ON BACK TO SITTING ON SIDE OF FLAT BED WITH BEDRAILS: A LITTLE
PERSONAL GROOMING: A LITTLE
MOVING TO AND FROM BED TO CHAIR: TOTAL
CLIMB 3 TO 5 STEPS WITH RAILING: TOTAL
MOVING TO AND FROM BED TO CHAIR: TOTAL
HELP NEEDED FOR BATHING: A LOT
EATING MEALS: A LITTLE
DRESSING REGULAR LOWER BODY CLOTHING: A LOT
HELP NEEDED FOR BATHING: A LOT
TOILETING: A LOT
MOVING FROM LYING ON BACK TO SITTING ON SIDE OF FLAT BED WITH BEDRAILS: A LOT
DRESSING REGULAR UPPER BODY CLOTHING: A LITTLE
STANDING UP FROM CHAIR USING ARMS: TOTAL
WALKING IN HOSPITAL ROOM: TOTAL
DAILY ACTIVITIY SCORE: 13
DAILY ACTIVITIY SCORE: 17
DRESSING REGULAR UPPER BODY CLOTHING: A LOT
DRESSING REGULAR LOWER BODY CLOTHING: TOTAL
MOBILITY SCORE: 7

## 2024-03-19 ASSESSMENT — ACTIVITIES OF DAILY LIVING (ADL): ADL_ASSISTANCE: INDEPENDENT

## 2024-03-19 ASSESSMENT — PAIN SCALES - GENERAL
PAINLEVEL_OUTOF10: 1
PAINLEVEL_OUTOF10: 0 - NO PAIN
PAINLEVEL_OUTOF10: 1
PAINLEVEL_OUTOF10: 5 - MODERATE PAIN
PAINLEVEL_OUTOF10: 0 - NO PAIN
PAINLEVEL_OUTOF10: 1

## 2024-03-19 ASSESSMENT — PAIN - FUNCTIONAL ASSESSMENT
PAIN_FUNCTIONAL_ASSESSMENT: 0-10

## 2024-03-19 NOTE — PROGRESS NOTES
Occupational Therapy    Evaluation    Patient Name: Jason Hollins  MRN: [id number]  Today's Date: 3/19/2024  Time Calculation  Start Time: 0857  Stop Time: 0932  Time Calculation (min): 35 min    Assessment  IP OT Assessment  OT Assessment: difficulty I/ADLs, safety, fxnl mob  Prognosis: Good  Barriers to Discharge: None  Evaluation/Treatment Tolerance: Patient tolerated treatment well  Medical Staff Made Aware: Yes  End of Session Communication: Bedside nurse  End of Session Patient Position: Bed, 3 rail up, Alarm off, not on at start of session  Plan:  Treatment Interventions: ADL retraining, Functional transfer training, UE strengthening/ROM, Endurance training, Patient/family training, Equipment evaluation/education, Compensatory technique education  OT Frequency: 3 times per week  OT Discharge Recommendations: Moderate intensity level of continued care  Equipment Recommended upon Discharge:  (cardiac/recliner chair, WhW, BSC, tub bench, hip kit)  OT Recommended Transfer Status: Assist of 2  OT - OK to Discharge: Yes    Subjective   Current Problem:  1. Extraskeletal myxoid chondrosarcoma (CMS/HCC)        2. Soft tissue sarcoma (CMS/HCC)  Surgical Pathology Exam    Surgical Pathology Exam      3. Type 2 diabetes mellitus with hyperglycemia, without long-term current use of insulin (CMS/HCC)  Oral nutritional supplements        General:  General  Reason for Referral: s/p radiation. Patient taken to the OR on 3/5/2024 for wide resection of gluteal sarcoma and sciatic nerve neurolysis per Dr. Hawkins of Orthopedic Surgery followed by right gluteal reconstruction, pedicle ALT, vastus lateralus flap, pedicle gluteal and perisformis flap, pelvic closure and incisional wound vac placement  Past Medical History Relevant to Rehab: DM II, HPL, HTN and gluteal myxoid chondrosarcoma  Family/Caregiver Present: No  Co-Treatment: PT  Co-Treatment Reason: maximize pt safety  Prior to Session Communication: Bedside nurse  Patient  Position Received: Bed, 3 rail up, Alarm off, not on at start of session  Precautions:  LE Weight Bearing Status:  (no right hip flexion, right knee flexion ok, right hip ABD/ADD ok, NWB RLE)  Medical Precautions: Fall precautions  EXTENDED time required all mob  Pain:  Pain Assessment  Pain Assessment: 0-10  Pain Score: 1  Pain Location:  (RLE)    Objective   Cognition:  Overall Cognitive Status: Within Functional Limits  Orientation Level: Oriented X4  Following Commands: Follows all commands and directions without difficulty  Safety Judgment: Good awareness of safety precautions           Home Living:  Type of Home: House  Lives With: Spouse  Home Adaptive Equipment: Cane  Home Layout: Two level (2 LUBA, flight to bed/bathroom with tub shower 2nd floor, 1/2 bath basement and 1st floor)  Bathroom Shower/Tub: Tub/shower unit  Bathroom Toilet: Standard   Prior Function:  Level of [place]: Independent with ADLs and functional transfers, Independent with homemaking with ambulation  Ambulatory Assistance: Independent  Vocational: Full time employment ()  Leisure: , watch movies  Hand Dominance: Ambidextrous  IADL History:  IADL Comments: I I/ADLs, +drives, - pets  ADL:  Eating Assistance: Independent  Grooming Assistance: Independent (anticipated sup)  Bathing Assistance:  (max A anticipated LB)  UE Dressing Assistance: Independent  LE Dressing Assistance: Maximal  Toileting Assistance with Device:  (max A anticipated)  Functional Deficit: Setup, Steadying, Increased time to complete  Activity Tolerance:  Endurance: Decreased tolerance for upright activites  Bed Mobility/Transfers: Bed Mobility  Bed Mobility: Yes  Bed Mobility 1  Level of Assistance 1:  (rolling 2x min A each way, bed change)  Bed Mobility 2  Level of Assistance 2:  (sup to sit partial ROM 2/2 no hip flexion max A x2, boost 2x max A x2)    IADL's:   IADL Comments: I I/ADLs, +drives, - pets  Vision: Vision - Basic  Assessment  Current Vision: Wears glasses only for reading  Sensation:  Light Touch:  (n/t RLE)  Strength:  Strength Comments: BUE WFL    Coordination:  Movements are Fluid and Coordinated: Yes   Hand Function:  Hand Function  Gross Grasp: Functional  Extremities: RUE   RUE : Within Functional Limits and LUE   LUE: Within Functional Limits      Outcome Measures: [facility] Daily Activity  Putting on and taking off regular lower body clothing: A lot  Bathing (including washing, rinsing, drying): A lot  Putting on and taking off regular upper body clothing: A little  Toileting, which includes using toilet, bedpan or urinal: A lot  Taking care of personal grooming such as brushing teeth: None  Eating Meals: None  Daily Activity - Total Score: 17         and OT Adult Other Outcome Measures  4AT: -  Education Documentation  Body Mechanics, taught by Nely Carlisle OT at 3/19/2024 11:37 AM.  Learner: Patient  Readiness: Acceptance  Method: Explanation  Response: Verbalizes Understanding    Precautions, taught by Nely Carlisle OT at 3/19/2024 11:37 AM.  Learner: Patient  Readiness: Acceptance  Method: Explanation  Response: Verbalizes Understanding    ADL Training, taught by Nely Carlisle OT at 3/19/2024 11:37 AM.  Learner: Patient  Readiness: Acceptance  Method: Explanation  Response: Verbalizes Understanding    Education Comments  No comments found.      Goals:   Encounter Problems       Encounter Problems (Active)       ADLs       Patient will perform UB and LB bathing  with stand by assist level of assistance and AE. (Progressing)       Start:  03/19/24    Expected End:  04/09/24            Patient with complete upper body dressing with independent level  (Progressing)       Start:  03/19/24    Expected End:  04/09/24            Patient with complete lower body dressing with minimal assist  level of assistance donning and doffing all LE clothes  with PRN adaptive equipment  (Progressing)       Start:  03/19/24     Expected End:  04/09/24            Patient will complete daily grooming tasks  with independent level  (Progressing)       Start:  03/19/24    Expected End:  04/09/24            Patient will complete toileting including hygiene clothing management/hygiene with moderate assist level of assistance and LRD. (Progressing)       Start:  03/19/24    Expected End:  04/09/24               EXERCISE/STRENGTHENING       Patient with increase BUE to WFL strength. (Progressing)       Start:  03/19/24    Expected End:  04/09/24               MOBILITY       Patient will perform Functional mobility min Household distances/Community Distances with moderate assist level of assistance and least restrictive device in order to improve safety and functional mobility. (Progressing)       Start:  03/19/24    Expected End:  04/09/24               TRANSFERS       Patient will perform bed mobility moderate assist level of assistance and bed rails in order to improve safety and independence with mobility (Progressing)       Start:  03/19/24    Expected End:  04/09/24            Patient will complete functional transfer with least restrictive device with moderate assist level of assistance. (Progressing)       Start:  03/19/24    Expected End:  04/09/24

## 2024-03-19 NOTE — PROGRESS NOTES
TCC Note 3-19-24    Plan per Medical/Surgical Team: s/p (R) glutal reconstruction, vastus lateralus flap, gluteal and perisformis flap with incisional wound vac placement. Flap checks, incisional wound vac/care. Off bedrest, pending PT/OT recs  Status: inpatient  Payor Source:[employer]  Discharge disposition: SNF [facility]  Expected date of discharge: 3/20/24  Barriers: None   Will continue to follow patient for any discharge needs. Debbie Lainez RN, [facility]    TCC Note 3-12-24    Demographics and insurance verifed with patient. Will continue to follow patient for any discharge needs.   Plan per Medical/Surgical Team: s/p (R) glutal reconstruction, vastus lateralus flap, gluteal and perisformis flap with incisional wound vac placement. Flap checks, incisional wound vac/care, bedrest until 3/19. Rasta  aid invoidingANDERSON drain   Status: Inpatient  Payor Source: [employer]  Discharge disposition:  Expected date of discharge: 3/20/24 TBD  Barriers:  Preferred home care agency:  Will continue to follow patient for any discharge needs.     TCC Note 3/6/24    Plan per Medical/Surgical Team: POD#1 s/p r glutal reconstruction, vastus lateralus flap, gluteal and perisformis flap with incisional wound vac placement. Flap checks, incisional wound vac/care, bedrest x10 days, daniels, ANDERSON drain   Status: Inpatient  Payor Source: [employer]  Discharge disposition: SNF vs Home with HC  Expected date of discharge: 3/13/24   Barriers: Medical  Preferred home care agency:[facility]  Will continue to follow patient for any discharge needs. Debbie Lainez RN, [facility]   03/06/24 1411   Discharge Planning   Living Arrangements Spouse/significant other   Support Systems Spouse/significant other   Assistance Needed Patient is independent with adls, does not use assistive devices.   Type of Residence Private residence   Number of Stairs to Enter Residence 2   Number of Stairs Within Residence 12   Do you have animals or pets at home? No   Who is requesting  discharge planning? Provider   Home or Post Acute Services Post acute facilities (Rehab/SNF/etc)   Type of Post Acute Facility Services Rehab   Patient expects to be discharged to: Home with HC vs SNF   Does the patient need discharge transport arranged? Yes   RoundTrip coordination needed? Yes   Has discharge transport been arranged? No     Met with patient and introduced myself as care cooordinator and member ot Care transitions team for discharge planning.  Patient is independent, does not use assistive devices. Wife is primary teachable caregiver. Patient denies falls, feels safe at home. [facility] if needed, patient is also agreeable to SNF if needed. PCP: Mary Arenas CNP. Pharmacy: [address] Demographics and contacts verified. Will continue to assist with discharge needs. Debbie Lainez RN, TCC

## 2024-03-19 NOTE — PROGRESS NOTES
Occupational Therapy    Evaluation/Treatment    Patient Name: Jason Hollins  MRN: [id number]  : 1960  Today's Date: 24  Time Calculation  Start Time: 857  Stop Time: 932  Time Calculation (min): 35 min       Assessment:  OT Assessment: difficulty I/ADLs, safety, fxnl mob  Prognosis: Good  Barriers to Discharge: None  Evaluation/Treatment Tolerance: Patient tolerated treatment well  Medical Staff Made Aware: Yes  End of Session Communication: Bedside nurse  End of Session Patient Position: Bed, 3 rail up, Alarm off, not on at start of session  OT Assessment Results: Decreased ADL status, Decreased endurance, Decreased functional mobility, Decreased gross motor control, Decreased IADLs  Prognosis: Good  Barriers to Discharge: None  Evaluation/Treatment Tolerance: Patient tolerated treatment well  Medical Staff Made Aware: Yes  Strengths: Attitude of self  Barriers to Participation: Comorbidities (sx limitations)  Plan:  Treatment Interventions: ADL retraining, Functional transfer training, UE strengthening/ROM, Endurance training, Patient/family training, Equipment evaluation/education, Compensatory technique education  OT Frequency: 3 times per week  OT Discharge Recommendations: Moderate intensity level of continued care  Equipment Recommended upon Discharge:  (cardiac/recliner chair, WhW, BSC, tub bench, hip kit)  OT Recommended Transfer Status: Assist of 2  OT - OK to Discharge: Yes  Treatment Interventions: ADL retraining, Functional transfer training, UE strengthening/ROM, Endurance training, Patient/family training, Equipment evaluation/education, Compensatory technique education    Subjective   Current Problem:  1. Extraskeletal myxoid chondrosarcoma (CMS/HCC)        2. Soft tissue sarcoma (CMS/HCC)  Surgical Pathology Exam    Surgical Pathology Exam      3. Type 2 diabetes mellitus with hyperglycemia, without long-term current use of insulin (CMS/HCC)  Oral nutritional supplements        General:    OT Received On: 03/19/24  General  Reason for Referral: s/p radiation. Patient taken to the OR on 3/5/2024 for wide resection of gluteal sarcoma and sciatic nerve neurolysis per Dr. Hawkins of Orthopedic Surgery followed by right gluteal reconstruction, pedicle ALT, vastus lateralus flap, pedicle gluteal and perisformis flap, pelvic closure and incisional wound vac placement  Past Medical History Relevant to Rehab: DM II, HPL, HTN and gluteal myxoid chondrosarcoma  Family/Caregiver Present: No  Co-Treatment: PT  Co-Treatment Reason: maximize pt safety  Prior to Session Communication: Bedside nurse  Patient Position Received: Bed, 3 rail up, Alarm off, not on at start of session  Precautions:  LE Weight Bearing Status:  (no right hip flexion, right knee flexion ok, right hip ABD/ADD ok, NWB RLE)  Medical Precautions: Fall precautions       Pain:  Pain Assessment  Pain Assessment: 0-10  Pain Score: 1  Pain Location:  (RLE)    Objective   Cognition:  Overall Cognitive Status: Within Functional Limits  Orientation Level: Oriented X4  Following Commands: Follows all commands and directions without difficulty  Safety Judgment: Good awareness of safety precautions           Home Living:  Type of Home: House  Lives With: Spouse  Home Adaptive Equipment: Cane  Home Layout: Two level (2 LUBA, flight to bed/bathroom with tub shower 2nd floor, 1/2 bath basement and 1st floor)  Bathroom Shower/Tub: Tub/shower unit  Bathroom Toilet: Standard  Prior Function:  Level of [place]: Independent with ADLs and functional transfers, Independent with homemaking with ambulation  Ambulatory Assistance: Independent  Vocational: Full time employment ()  Leisure: , watch movies  Hand Dominance: Ambidextrous  IADL History:  IADL Comments: I I/ADLs, +drives, - pets  ADL:  Eating Assistance: Independent  Grooming Assistance: Independent (anticipated sup)  Bathing Assistance:  (max A anticipated LB)  UE Dressing  Assistance: Independent  LE Dressing Assistance: Maximal  Toileting Assistance with Device:  (max A anticipated)  Functional Deficit: Setup, Steadying, Increased time to complete  Activities of Daily Living:      UE Dressing  UE Dressing Level of Assistance:  (adjusted gown I supine)     Activity Tolerance:  Endurance: Decreased tolerance for upright activites       Bed Mobility/Transfers: Bed Mobility  Bed Mobility: Yes  Bed Mobility 1  Level of Assistance 1:  (rolling 2x min A each way, bed change)  Bed Mobility 2  Level of Assistance 2:  (sup to sit partial ROM 2/2 no hip flexion max A x2, boost 2x max A x2)     Therapy/Activity: Therapeutic Exercise  Therapeutic Exercise Performed: Yes  Therapeutic Exercise Activity 1: OT administered 2 yellow mild resistance bands and 1 red mod resistance bed. OT demo'd/educated verbal HEP 3x10 reps shoulder flex/ext, elbow flex/ext, shoulder internal/external rotation, tricep ext, chest press with bands increasing resistance accordingly. pt performed LUE 1x 10 reps tricep ext, and elbow flex with mod band     Vision:Vision - Basic Assessment  Current Vision: Wears glasses only for reading  Sensation:  Light Touch:  (n/t RLE)  Strength:  Strength Comments: BUE WFL     Coordination:  Movements are Fluid and Coordinated: Yes   Hand Function:  Hand Function  Gross Grasp: Functional  Extremities: RUE   RUE : Within Functional Limits and LUE   LUE: Within Functional Limits      Outcome Measures: [facility] Daily Activity  Putting on and taking off regular lower body clothing: A lot  Bathing (including washing, rinsing, drying): A lot  Putting on and taking off regular upper body clothing: A little  Toileting, which includes using toilet, bedpan or urinal: A lot  Taking care of personal grooming such as brushing teeth: None  Eating Meals: None  Daily Activity - Total Score: 17         and OT Adult Other Outcome Measures  4AT: -    Education Documentation  Body Mechanics, taught by Nely  ZEINAB Carlisle at 3/19/2024 11:37 AM.  Learner: Patient  Readiness: Acceptance  Method: Explanation  Response: Verbalizes Understanding    Precautions, taught by Nely Carlisle OT at 3/19/2024 11:37 AM.  Learner: Patient  Readiness: Acceptance  Method: Explanation  Response: Verbalizes Understanding    ADL Training, taught by Nely Carlisle OT at 3/19/2024 11:37 AM.  Learner: Patient  Readiness: Acceptance  Method: Explanation  Response: Verbalizes Understanding    Education Comments  No comments found.        OP EDUCATION:       Goals:  Encounter Problems       Encounter Problems (Active)       ADLs       Patient will perform UB and LB bathing  with stand by assist level of assistance and AE. (Progressing)       Start:  03/19/24    Expected End:  04/09/24            Patient with complete upper body dressing with independent level  (Progressing)       Start:  03/19/24    Expected End:  04/09/24            Patient with complete lower body dressing with minimal assist  level of assistance donning and doffing all LE clothes  with PRN adaptive equipment  (Progressing)       Start:  03/19/24    Expected End:  04/09/24            Patient will complete daily grooming tasks  with independent level  (Progressing)       Start:  03/19/24    Expected End:  04/09/24            Patient will complete toileting including hygiene clothing management/hygiene with moderate assist level of assistance and LRD. (Progressing)       Start:  03/19/24    Expected End:  04/09/24               EXERCISE/STRENGTHENING       Patient with increase BUE to WFL strength. (Progressing)       Start:  03/19/24    Expected End:  04/09/24               MOBILITY       Patient will perform Functional mobility min Household distances/Community Distances with moderate assist level of assistance and least restrictive device in order to improve safety and functional mobility. (Progressing)       Start:  03/19/24    Expected End:  04/09/24                TRANSFERS       Patient will perform bed mobility moderate assist level of assistance and bed rails in order to improve safety and independence with mobility (Progressing)       Start:  03/19/24    Expected End:  04/09/24            Patient will complete functional transfer with least restrictive device with moderate assist level of assistance. (Progressing)       Start:  03/19/24    Expected End:  04/09/24

## 2024-03-19 NOTE — PROGRESS NOTES
Jason Hollins is a 63 y.o. male on day 14 of admission presenting with Soft tissue sarcoma (CMS/HCC).    Transitional Care Coordinator Note: Met with patient to discuss discharge planning s/p admission. Spoke with the patient's spouse who provided choices, 1. [facility], 2. [facility] 3. [facility]. Patient is currently on bedrest until 3/19. TCC team will continue to follow. Chanel Lama RN TCC via Epic.    3/19/24  1633  Pt has been accepted at [facility].  SW relayed above to pt.  He would like to explore [facility].  [facility] unable to accept due to only accepting pt 65+.  Ousmane joshua Amidon asking about cancer treatment plan while at SNF.  Will reach out to team for answers to questions.  Will follow.  COOPER Francois

## 2024-03-19 NOTE — PROGRESS NOTES
Physical Therapy    Physical Therapy Evaluation & Treatment    Patient Name: Jason Hollins  MRN: [id number]  Today's Date: 3/19/2024   Time Calculation  Start Time: 0858  Stop Time: 0938  Time Calculation (min): 40 min    Assessment/Plan   PT Assessment  PT Assessment Results: Decreased strength, Decreased range of motion, Decreased endurance, Impaired balance, Decreased mobility, Orthopedic restrictions, Pain  Rehab Prognosis: Good  Barriers to Discharge: none  Evaluation/Treatment Tolerance: Patient limited by fatigue  Medical Staff Made Aware: Yes  Strengths: Attitude of self, Coping skills, Physical health  Barriers to Participation: Other (Comment) (mobility restrictions)  End of Session Communication: Bedside nurse  Assessment Comment: The pt able to roll right/left with minimal assistance, but unable to perform further functional due to restrictions. Pt instructed on acceptable LE exercises to perform for increased strength.  End of Session Patient Position: Bed, 3 rail up, Alarm off, not on at start of session   IP OR SWING BED PT PLAN  Inpatient or Swing Bed: Inpatient  PT Plan  Treatment/Interventions: Bed mobility, Transfer training, Gait training, Balance training, Strengthening, Endurance training, Therapeutic exercise, Therapeutic activity, Home exercise program  PT Plan: Skilled PT  PT Frequency: 3 times per week  PT Discharge Recommendations: Moderate intensity level of continued care  Equipment Recommended upon Discharge:  (none)  PT Recommended Transfer Status: Assist x2  PT - OK to Discharge: Yes      Subjective     General Visit Information:  General  Reason for Referral: Right gluteal extraskeletal myxoid chondrosarcoma s/p wide resection with extensive tissue reconstruction, pedicle ALT, vastus lateralus flap, pedicle gluteal and periformis flap with incisional wound vac placement.  Past Medical History Relevant to Rehab: DM II, HLD, HTN, right gluteal extraskeletal myxoid  chondrosarcoma  Co-Treatment: OT  Co-Treatment Reason: PT/OT co-eval for increased pt safety.  Prior to Session Communication: Bedside nurse  Patient Position Received: Bed, 3 rail up, Alarm off, not on at start of session  Preferred Learning Style: verbal, visual, written  General Comment: The pt was pleasant, cooperative and willing to participate in therapy.  Home Living:  Home Living  Type of Home: House  Lives With: Spouse  Home Adaptive Equipment: Cane  Home Layout: Two level, Stairs to alternate level with rails  Alternate Level Stairs-Rails: Left  Alternate Level Stairs-Number of Steps: 13  Home Access: Stairs to enter without rails  Entrance Stairs-Rails: None  Entrance Stairs-Number of Steps: 2  Bathroom Shower/Tub: Tub/shower unit, Walk-in shower  Bathroom Toilet: Standard  Bathroom Equipment: None  Prior Level of Function:  Prior Function Per Pt/Caregiver Report  Level of [place]: Independent with ADLs and functional transfers, Independent with homemaking with ambulation  Receives Help From: Family  ADL Assistance: Independent  Homemaking Assistance: Independent  Ambulatory Assistance: Independent  Vocational: Full time employment  Leisure:   Hand Dominance: Ambidextrous  Prior Function Comments: Pt was independent with all mobility without an assistive device in the household and in the community.  Precautions:  Precautions  Hearing/Visual Limitations: Hearing and vision WFL with reading glasses.  LE Weight Bearing Status: Right Non-Weight Bearing (no right hip flexion, right knee flexion ok, right hip ABD/ADD ok)  Medical Precautions: Fall precautions  Precautions Comment: Pt in compliance with precautions throughout therapy session.  Vital Signs:       Objective   Pain:  Pain Assessment  Pain Assessment: 0-10  Pain Score: 1  Pain Type: Acute pain, Surgical pain  Pain Location: Hip  Pain Orientation: Right  Cognition:  Cognition  Overall Cognitive Status: Within Functional  Limits    General Assessments:  General Observation  General Observation: Pt unable to perform supine to sit to supine transfers due to no hip flexion restriction.     Activity Tolerance  Endurance: Decreased tolerance for upright activites    Strength  Strength Comments: R LE decreased strength  Coordination  Movements are Fluid and Coordinated: Yes  Functional Assessments:  Bed Mobility  Bed Mobility: Yes  Bed Mobility 1  Bed Mobility 1: Rolling right, Rolling left (Partial supine to sit to supine max x 2.)  Level of Assistance 1: Minimum assistance  Bed Mobility Comments 1: Pt unable to complete transfers due to mobility restrictions.  Extremity/Trunk Assessments:  Cervical Spine   Cervical Spine: Within Functional Limits  Lumbar Spine   Lumbar Spine : Within Functional Limits    RUE   RUE : Within Functional Limits  LUE   LUE: Within Functional Limits  RLE   RLE : Exceptions to WFL  AROM RLE (degrees)  RLE AROM Comment: decreased hip and knee flexion  Strength RLE  R Hip Flexion: 1/5  R Knee Flexion: 2/5  R Knee Extension: 2/5  R Ankle Dorsiflexion: 4/5  R Ankle Plantar Flexion: 4/5  LLE   LLE : Within Functional Limits  Treatments:  Bed Mobility  Bed Mobility: Yes  Bed Mobility 1  Bed Mobility 1: Rolling right, Rolling left (Partial supine to sit to supine max x 2.)  Level of Assistance 1: Minimum assistance  Bed Mobility Comments 1: Pt unable to complete transfers due to mobility restrictions.  Outcome Measures:  [facility] Basic Mobility  Turning from your back to your side while in a flat bed without using bedrails: A little  Moving from lying on your back to sitting on the side of a flat bed without using bedrails: Total  Moving to and from bed to chair (including a wheelchair): Total  Standing up from a chair using your arms (e.g. wheelchair or bedside chair): Total  To walk in hospital room: Total  Climbing 3-5 steps with railing: Total  Basic Mobility - Total Score: 8    Encounter Problems       Encounter  Problems (Active)       Balance       STG - Maintains SBA dynamic standing balance with upper extremity support using a wheeled walker. (Progressing)       Start:  03/19/24    Expected End:  04/02/24            STG - Maintains SBA static standing balance with upper extremity support using a wheeled walker. (Progressing)       Start:  03/19/24    Expected End:  04/02/24               Mobility       STG - Patient will ambulate 10ft using a wheeled walker with SBA. (Progressing)       Start:  03/19/24    Expected End:  04/02/24               PT Transfers       STG - Patient will transfer supine to and from stand with moderate assistance. (Progressing)       Start:  03/19/24    Expected End:  04/02/24                   Education Documentation  Handouts, taught by Sven Carias PT at 3/19/2024 10:54 AM.  Learner: Patient  Readiness: Acceptance  Method: Explanation, Demonstration  Response: Demonstrated Understanding, Verbalizes Understanding    Precautions, taught by Sven Carias PT at 3/19/2024 10:54 AM.  Learner: Patient  Readiness: Acceptance  Method: Explanation, Demonstration  Response: Demonstrated Understanding, Verbalizes Understanding    Body Mechanics, taught by Sven Carias PT at 3/19/2024 10:54 AM.  Learner: Patient  Readiness: Acceptance  Method: Explanation, Demonstration  Response: Demonstrated Understanding, Verbalizes Understanding    Home Exercise Program, taught by Sven Carias PT at 3/19/2024 10:54 AM.  Learner: Patient  Readiness: Acceptance  Method: Explanation, Demonstration  Response: Demonstrated Understanding, Verbalizes Understanding    Mobility Training, taught by Sven Carias PT at 3/19/2024 10:54 AM.  Learner: Patient  Readiness: Acceptance  Method: Explanation, Demonstration  Response: Demonstrated Understanding, Verbalizes Understanding    Education Comments  No comments found.

## 2024-03-19 NOTE — CARE PLAN
The clinical goals for the shift include Pt to remain HDS and comfortable    Over the shift, the patient did make progress toward the following goals.     Problem: Diabetes  Goal: Achieve decreasing blood glucose levels by end of shift  Outcome: Progressing  Goal: Increase stability of blood glucose readings by end of shift  Outcome: Progressing  Goal: Decrease in ketones present in urine by end of shift  Outcome: Progressing  Goal: Maintain electrolyte levels within acceptable range throughout shift  Outcome: Progressing  Goal: Maintain glucose levels >70mg/dl to <250mg/dl throughout shift  Outcome: Progressing  Goal: No changes in neurological exam by end of shift  Outcome: Progressing  Goal: Learn about and adhere to nutrition recommendations by end of shift  Outcome: Progressing  Goal: Vital signs within normal range for age by end of shift  Outcome: Progressing  Goal: Increase self care and/or family involovement by end of shift  Outcome: Progressing  Goal: Receive DSME education by end of shift  Outcome: Progressing     Problem: Pain  Goal: Takes deep breaths with improved pain control throughout the shift  Outcome: Progressing  Goal: Turns in bed with improved pain control throughout the shift  Outcome: Progressing  Goal: Walks with improved pain control throughout the shift  Outcome: Progressing  Goal: Performs ADL's with improved pain control throughout shift  Outcome: Progressing  Goal: Participates in PT with improved pain control throughout the shift  Outcome: Progressing  Goal: Free from opioid side effects throughout the shift  Outcome: Progressing  Goal: Free from acute confusion related to pain meds throughout the shift  Outcome: Progressing     Problem: Skin  Goal: Decreased wound size/increased tissue granulation at next dressing change  Outcome: Progressing  Flowsheets (Taken 3/18/2024 1759)  Decreased wound size/increased tissue granulation at next dressing change:   Promote sleep for wound  healing   Utilize specialty bed per algorithm  Goal: Participates in plan/prevention/treatment measures  Outcome: Progressing  Flowsheets (Taken 3/18/2024 2159)  Participates in plan/prevention/treatment measures:   Elevate heels   Discuss with provider PT/OT consult  Goal: Prevent/manage excess moisture  Outcome: Progressing  Flowsheets (Taken 3/18/2024 2159)  Prevent/manage excess moisture:   Moisturize dry skin   Cleanse incontinence/protect with barrier cream   Monitor for/manage infection if present  Goal: Prevent/minimize sheer/friction injuries  Outcome: Progressing  Flowsheets (Taken 3/18/2024 2159)  Prevent/minimize sheer/friction injuries:   Use pull sheet   Utilize specialty bed per algorithm  Goal: Promote/optimize nutrition  Outcome: Progressing  Flowsheets (Taken 3/18/2024 2159)  Promote/optimize nutrition:   Consume > 50% meals/supplements   Monitor/record intake including meals   Offer water/supplements/favorite foods  Goal: Promote skin healing  Outcome: Progressing  Flowsheets (Taken 3/18/2024 2159)  Promote skin healing:   Rotate device position/do not position patient on device   Assess skin/pad under line(s)/device(s)

## 2024-03-19 NOTE — CARE PLAN
The clinical goals for the shift include Pt will be able to stand up with PT and be able to tolerate mobilization      Problem: Diabetes  Goal: Achieve decreasing blood glucose levels by end of shift  Outcome: Progressing  Goal: Increase stability of blood glucose readings by end of shift  Outcome: Progressing  Goal: Decrease in ketones present in urine by end of shift  Outcome: Progressing  Goal: Maintain electrolyte levels within acceptable range throughout shift  Outcome: Progressing  Goal: Maintain glucose levels >70mg/dl to <250mg/dl throughout shift  Outcome: Progressing  Goal: No changes in neurological exam by end of shift  Outcome: Progressing  Goal: Learn about and adhere to nutrition recommendations by end of shift  Outcome: Progressing  Goal: Vital signs within normal range for age by end of shift  Outcome: Progressing  Goal: Increase self care and/or family involovement by end of shift  Outcome: Progressing  Goal: Receive DSME education by end of shift  Outcome: Progressing     Problem: Pain  Goal: Takes deep breaths with improved pain control throughout the shift  Outcome: Progressing  Goal: Turns in bed with improved pain control throughout the shift  Outcome: Progressing  Goal: Walks with improved pain control throughout the shift  Outcome: Progressing  Goal: Performs ADL's with improved pain control throughout shift  Outcome: Progressing  Goal: Participates in PT with improved pain control throughout the shift  Outcome: Progressing  Goal: Free from opioid side effects throughout the shift  Outcome: Progressing  Goal: Free from acute confusion related to pain meds throughout the shift  Outcome: Progressing     Problem: Skin  Goal: Decreased wound size/increased tissue granulation at next dressing change  Outcome: Progressing  Flowsheets (Taken 3/19/2024 9706)  Decreased wound size/increased tissue granulation at next dressing change:   Protective dressings over bony prominences   Promote sleep for  wound healing  Goal: Participates in plan/prevention/treatment measures  Outcome: Progressing  Flowsheets (Taken 3/19/2024 1516)  Participates in plan/prevention/treatment measures:   Elevate heels   Discuss with provider PT/OT consult   Increase activity/out of bed for meals  Goal: Prevent/manage excess moisture  Outcome: Progressing  Flowsheets (Taken 3/19/2024 1516)  Prevent/manage excess moisture:   Cleanse incontinence/protect with barrier cream   Monitor for/manage infection if present   Moisturize dry skin  Goal: Prevent/minimize sheer/friction injuries  Outcome: Progressing  Flowsheets (Taken 3/19/2024 1516)  Prevent/minimize sheer/friction injuries:   Increase activity/out of bed for meals   HOB 30 degrees or less   Turn/reposition every 2 hours/use positioning/transfer devices   Use pull sheet  Goal: Promote/optimize nutrition  Outcome: Progressing  Flowsheets (Taken 3/19/2024 1516)  Promote/optimize nutrition:   Consume > 50% meals/supplements   Monitor/record intake including meals   Offer water/supplements/favorite foods  Goal: Promote skin healing  Outcome: Progressing  Flowsheets (Taken 3/19/2024 1516)  Promote skin healing:   Rotate device position/do not position patient on device   Protective dressings over bony prominences   Ensure correct size (line/device) and apply per  instructions   Assess skin/pad under line(s)/device(s)   Turn/reposition every 2 hours/use positioning/transfer devices

## 2024-03-19 NOTE — PROGRESS NOTES
"    Department of Plastic and Reconstructive Surgery  Daily Progress Note    Patient Name: Jason Hollins  MRN: [id number]  Date:  03/19/24     Subjective  Resting comfortably in bed in left lateral decubitus position. Pain controlled. Denies any fever, chills, night sweats, CP, SOB, palpitations, nausea, vomiting, diarrhea, or abdominal pain/discomfort.     Prevena wound vac removed at bedside, tolerated well. Pt aware of plans to discontinue bedrest  and work with PT today.     Objective    Vital Signs  /66   Pulse 76   Temp 36.1 °C (97 °F)   Resp 18   Ht 1.778 m (5' 10\")   Wt 101 kg (222 lb 14.2 oz)   SpO2 98%   BMI 31.98 kg/m²      Physical Exam   Constitutional: NAD.  Eyes: EOMI, clear sclera.  Head/Neck: NCAT.  Cardiovascular: RRR.  Respiratory/Thorax: Unlabored respirations on RA.  Gastrointestinal: Abdomen soft, ND/NT.   Musculoskeletal: NWB RLE. +SILT BLE. Intact RLE DP pulses. Compartments soft and compressible.   Neurological: A&Ox3.  Psychological: Appropriate mood and behavior.  Skin: Warm and dry, no rashes.  Extremity: Pedicle flap to right gluteal region appropriate in color. Pedicle flap warm to touch, soft and compressible. Incisional wound vac removed. Flap incisions healing appropriately, C/D/I. Noted radiation changes of tissue surrounding flap. Maintains lateral lying on left side, with leg flexed no greater than 20 degrees.     Diagnostics   Results for orders placed or performed during the hospital encounter of 03/05/24 (from the past 24 hour(s))   POCT GLUCOSE   Result Value Ref Range    POCT Glucose 134 (H) 74 - 99 mg/dL   POCT GLUCOSE   Result Value Ref Range    POCT Glucose 155 (H) 74 - 99 mg/dL   POCT GLUCOSE   Result Value Ref Range    POCT Glucose 180 (H) 74 - 99 mg/dL   POCT GLUCOSE   Result Value Ref Range    POCT Glucose 158 (H) 74 - 99 mg/dL       Current Medications  Scheduled medications  acetaminophen, 650 mg, oral, q6h LEILA  aspirin, 81 mg, oral, Daily  atorvastatin, 40 " mg, oral, Nightly  docusate sodium, 100 mg, oral, BID  enoxaparin, 40 mg, subcutaneous, q24h  gabapentin, 300 mg, oral, q8h LEILA  insulin glargine, 12 Units, subcutaneous, Nightly  insulin lispro, 0-10 Units, subcutaneous, TID AC  insulin lispro, 4 Units, subcutaneous, TID with meals  metFORMIN, 1,000 mg, oral, Daily with breakfast  metFORMIN, 1,500 mg, oral, Nightly  methocarbamol, 500 mg, oral, q6h LEILA  multivitamin with minerals, 1 tablet, oral, Daily      Continuous medications     PRN medications  PRN medications: dextrose 10 % in water (D10W), dextrose, diphenhydrAMINE, glucagon, magnesium hydroxide, naloxone, ondansetron **OR** ondansetron, oxyCODONE, oxyCODONE     Assessment  Jason Hollins is a 63 y.o. male with a past medical history significant for DM II, HPL, HTN and gluteal myxoid chondrosarcoma s/p radiation. Patient taken to the OR on 3/5/2024 for wide resection of gluteal sarcoma and sciatic nerve neurolysis per Dr. Hawkins of Orthopedic Surgery followed by right gluteal reconstruction, pedicle ALT, vastus lateralus flap, pedicle gluteal and perisformis flap, pelvic closure and incisional wound vac placement per Dr. Smith of Plastic Surgery.     Plan/Recommendations  # S/p right gluteal reconstruction, pedicle ALT, vastus lateralus flap, pedicle gluteal and perisformis flap with incisional wound vac placement   - Serial flap assessments Q6hour per nursing with interval checks per plastics       ·  Assess flap appearance (color, warmth, turgor) over windowed area (no doppler assessment required)        ·  Nursing to notify plastic surgery team immediately if there are any acute changes          (Including pallor, temperature change, bleeding, etc.)  - Incisional wound vac removed at bedside (3/19), no issues or complications. Surgical incisions healing well, C/D/I   - Maintain left lateral decubitus positioning to avoid pressure to flap region/incisions  - Discontinue use of knee immobilizer  - Discontinue  bedrest today (3/19)  - PT/OT re-consulted with following restrictions:       ·  NWB RLE       ·  Avoid prolonged hip flexion of the R leg. OK to flex hip to get out of bed and use bedside commode/toilet. May sit in a chair for 15-20 mins at a time with seat reclined and legs elevated        ·  OK for knee flexion and abduction/adduction of the R leg  - Continue use of purewick to aid in voiding and prevent contamination (Marino removed POD#1)- plan to remove with discontinuation of bedrest order  - Monitor VS/pulse ox [id number]   - Monitor AM CBC/RFP/Mg PRN      # Acute post op pain  - Pain well controlled per patient, continue current regimen        ·  Acetaminophen 650mg Q6 hours scheduled       ·  PO Robaxin re-initiated 3/15       ·  Gabapentin 300mg Q8 scheduled       ·  Oxycodone 5/10mg [id number] PRN moderate/severe pain   - Zofran 4mg PRN for opoid associated nausea  - Benadryl 25mg PRN for itching  - Monitor pain assessments [id number]      # Hx DM type II  - Diabetic diet (low carb 60 CHO), glucerna supplemental shakes and fairlife shakes   - Continue home metformin POD1 (1,000mg AM, 1,500mg PM)  - Hypoglycemic protocol in place  - Pt has not seen his PCP for diabetic mgmt in over 6 months; checks blood sugar occasionally at home, typically runs in the 180s   - Consult to inpatient endocrinology for inpatient diabetic management and possible adjustments to home diabetic regimen given persistently elevated glucose levels post operatively        ·  Glargine 12units at bedtime       ·  Lispro 4units with meals       ·  Lispro corrective scale #2 with meals   - Accuchecks TID and at bedtime      # Hx hyperlipidemia   - Continue Atorvastatin 40mg daily     # Hx HTN  - BP stable, does not take any home medications  - Monitor VS [id number]      # Vitamin D deficiency  - Patient takes Vitamin D2 50,000 units twice monthly (15th and 30th)  - Dose ordered for 3/15, if patient remains in house, will need dose reordered for  3/30     FEN/GI:   - Encouraged PO intake   - Monitor and replete lytes PRN  - Carb controlled diet   - Protein supplementation TID (glucerna or fairlife shakes)   - Bowel regimen: Colace 100mg BID and Milk of Mag PRN     Prophylaxis:   - DVT: SQ Lovenox 40mg daily, home ASA 81mg daily, SCDs to RLE        ·  Anticipate discharge with ASA 81mg BID as per orthopedic recommendations   - Encouraged use of IS (x10 every hour while awake)     Disposition: Continue care on RNF. Bedrest order discontinued on 3/19, pending PT/OT evaluations. Patient supplied with SNF list per SW/TCC in anticipation of discharge to facility after completion of bed rest. Medically stable for discharge to SNF pending pre-cert. Will need follow up appointment with plastic surgery and orthopedic surgery closer to anticipated date of discharge.      Patient and plan discussed with MALLY HinkleC   Plastic and Reconstructive Surgery   Available via [employer], pager: [phone] or Team phones: [phone]

## 2024-03-20 ENCOUNTER — APPOINTMENT (OUTPATIENT)
Dept: RADIOLOGY | Facility: HOSPITAL | Age: 64
DRG: 003 | End: 2024-03-20
Payer: COMMERCIAL

## 2024-03-20 ENCOUNTER — APPOINTMENT (OUTPATIENT)
Dept: CARDIOLOGY | Facility: HOSPITAL | Age: 64
DRG: 003 | End: 2024-03-20
Payer: COMMERCIAL

## 2024-03-20 PROBLEM — I46.9 CARDIOPULMONARY ARREST (MULTI): Status: ACTIVE | Noted: 2024-02-28

## 2024-03-20 LAB
ALBUMIN SERPL BCP-MCNC: 2.8 G/DL (ref 3.4–5)
ALBUMIN SERPL BCP-MCNC: 2.8 G/DL (ref 3.4–5)
ALP SERPL-CCNC: 211 U/L (ref 33–136)
ALT SERPL W P-5'-P-CCNC: 670 U/L (ref 10–52)
ANION GAP BLDA CALCULATED.4IONS-SCNC: 25 MMO/L (ref 10–25)
ANION GAP BLDA CALCULATED.4IONS-SCNC: 26 MMO/L (ref 10–25)
ANION GAP BLDA CALCULATED.4IONS-SCNC: 26 MMO/L (ref 10–25)
ANION GAP BLDA CALCULATED.4IONS-SCNC: 29 MMO/L (ref 10–25)
ANION GAP BLDV CALCULATED.4IONS-SCNC: 12 MMOL/L (ref 10–25)
ANION GAP SERPL CALC-SCNC: 27 MMOL/L (ref 10–20)
AST SERPL W P-5'-P-CCNC: 887 U/L (ref 9–39)
BASE EXCESS BLDA CALC-SCNC: -14.5 MMOL/L (ref -2–3)
BASE EXCESS BLDA CALC-SCNC: -17.5 MMOL/L (ref -2–3)
BASE EXCESS BLDA CALC-SCNC: -18.4 MMOL/L (ref -2–3)
BASE EXCESS BLDA CALC-SCNC: -20.9 MMOL/L (ref -2–3)
BASE EXCESS BLDV CALC-SCNC: 9.7 MMOL/L (ref -2–3)
BILIRUB DIRECT SERPL-MCNC: 0.3 MG/DL (ref 0–0.3)
BILIRUB SERPL-MCNC: 0.7 MG/DL (ref 0–1.2)
BNP SERPL-MCNC: 2 PG/ML (ref 0–99)
BODY TEMPERATURE: 37 DEGREES CELSIUS
BUN SERPL-MCNC: 22 MG/DL (ref 6–23)
CA-I BLD-SCNC: 1.25 MMOL/L (ref 1.1–1.33)
CA-I BLDA-SCNC: 1.25 MMOL/L (ref 1.1–1.33)
CA-I BLDA-SCNC: 1.26 MMOL/L (ref 1.1–1.33)
CA-I BLDA-SCNC: 1.39 MMOL/L (ref 1.1–1.33)
CA-I BLDA-SCNC: 1.48 MMOL/L (ref 1.1–1.33)
CA-I BLDV-SCNC: 2.9 MMOL/L (ref 1.1–1.33)
CALCIUM SERPL-MCNC: 9 MG/DL (ref 8.6–10.6)
CHLORIDE BLDA-SCNC: 104 MMOL/L (ref 98–107)
CHLORIDE BLDA-SCNC: 106 MMOL/L (ref 98–107)
CHLORIDE BLDA-SCNC: 108 MMOL/L (ref 98–107)
CHLORIDE BLDA-SCNC: 110 MMOL/L (ref 98–107)
CHLORIDE BLDV-SCNC: 113 MMOL/L (ref 98–107)
CHLORIDE SERPL-SCNC: 107 MMOL/L (ref 98–107)
CO2 SERPL-SCNC: 15 MMOL/L (ref 21–32)
CREAT SERPL-MCNC: 1.22 MG/DL (ref 0.5–1.3)
EGFRCR SERPLBLD CKD-EPI 2021: 67 ML/MIN/1.73M*2
ERYTHROCYTE [DISTWIDTH] IN BLOOD BY AUTOMATED COUNT: 13.5 % (ref 11.5–14.5)
GLUCOSE BLD MANUAL STRIP-MCNC: 130 MG/DL (ref 74–99)
GLUCOSE BLD MANUAL STRIP-MCNC: 144 MG/DL (ref 74–99)
GLUCOSE BLD MANUAL STRIP-MCNC: 154 MG/DL (ref 74–99)
GLUCOSE BLD MANUAL STRIP-MCNC: 178 MG/DL (ref 74–99)
GLUCOSE BLDA-MCNC: 343 MG/DL (ref 74–99)
GLUCOSE BLDA-MCNC: 367 MG/DL (ref 74–99)
GLUCOSE BLDA-MCNC: 390 MG/DL (ref 74–99)
GLUCOSE BLDA-MCNC: 439 MG/DL (ref 74–99)
GLUCOSE BLDV-MCNC: 428 MG/DL (ref 74–99)
GLUCOSE SERPL-MCNC: 339 MG/DL (ref 74–99)
HCO3 BLDA-SCNC: 11.8 MMOL/L (ref 22–26)
HCO3 BLDA-SCNC: 12.7 MMOL/L (ref 22–26)
HCO3 BLDA-SCNC: 13.5 MMOL/L (ref 22–26)
HCO3 BLDA-SCNC: 13.6 MMOL/L (ref 22–26)
HCO3 BLDV-SCNC: 41 MMOL/L (ref 22–26)
HCT VFR BLD AUTO: 33.5 % (ref 41–52)
HCT VFR BLD EST: 26 % (ref 41–52)
HCT VFR BLD EST: 26 % (ref 41–52)
HCT VFR BLD EST: 28 % (ref 41–52)
HCT VFR BLD EST: 29 % (ref 41–52)
HCT VFR BLD EST: 32 % (ref 41–52)
HGB BLD-MCNC: 10.3 G/DL (ref 13.5–17.5)
HGB BLDA-MCNC: 10.6 G/DL (ref 13.5–17.5)
HGB BLDA-MCNC: 8.6 G/DL (ref 13.5–17.5)
HGB BLDA-MCNC: 8.6 G/DL (ref 13.5–17.5)
HGB BLDA-MCNC: 9.7 G/DL (ref 13.5–17.5)
HGB BLDV-MCNC: 9.3 G/DL (ref 13.5–17.5)
INHALED O2 CONCENTRATION: 100 %
INHALED O2 CONCENTRATION: 100 %
LACTATE BLDA-SCNC: 12.6 MMOL/L (ref 0.4–2)
LACTATE BLDA-SCNC: 14.3 MMOL/L (ref 0.4–2)
LACTATE BLDA-SCNC: 15.4 MMOL/L (ref 0.4–2)
LACTATE BLDA-SCNC: 15.9 MMOL/L (ref 0.4–2)
LACTATE BLDV-SCNC: 13.9 MMOL/L (ref 0.4–2)
MAGNESIUM SERPL-MCNC: 2.14 MG/DL (ref 1.6–2.4)
MCH RBC QN AUTO: 27.4 PG (ref 26–34)
MCHC RBC AUTO-ENTMCNC: 30.7 G/DL (ref 32–36)
MCV RBC AUTO: 89 FL (ref 80–100)
NRBC BLD-RTO: 0.4 /100 WBCS (ref 0–0)
OXYHGB MFR BLDA: 79.9 % (ref 94–98)
OXYHGB MFR BLDA: 91.6 % (ref 94–98)
OXYHGB MFR BLDA: 95 % (ref 94–98)
OXYHGB MFR BLDA: 95.2 % (ref 94–98)
OXYHGB MFR BLDV: 41.2 % (ref 45–75)
PCO2 BLDA: 40 MM HG (ref 38–42)
PCO2 BLDA: 48 MM HG (ref 38–42)
PCO2 BLDA: 66 MM HG (ref 38–42)
PCO2 BLDA: 66 MM HG (ref 38–42)
PCO2 BLDV: 115 MM HG (ref 41–51)
PH BLDA: 6.86 PH (ref 7.38–7.42)
PH BLDA: 6.92 PH (ref 7.38–7.42)
PH BLDA: 7.03 PH (ref 7.38–7.42)
PH BLDA: 7.14 PH (ref 7.38–7.42)
PH BLDV: 7.16 PH (ref 7.33–7.43)
PHOSPHATE SERPL-MCNC: 9.3 MG/DL (ref 2.5–4.9)
PLATELET # BLD AUTO: 536 X10*3/UL (ref 150–450)
PO2 BLDA: 101 MM HG (ref 85–95)
PO2 BLDA: 64 MM HG (ref 85–95)
PO2 BLDA: 85 MM HG (ref 85–95)
PO2 BLDA: 92 MM HG (ref 85–95)
PO2 BLDV: 33 MM HG (ref 35–45)
POTASSIUM BLDA-SCNC: 3.4 MMOL/L (ref 3.5–5.3)
POTASSIUM BLDA-SCNC: 3.9 MMOL/L (ref 3.5–5.3)
POTASSIUM BLDA-SCNC: 4.6 MMOL/L (ref 3.5–5.3)
POTASSIUM BLDA-SCNC: 5.4 MMOL/L (ref 3.5–5.3)
POTASSIUM BLDV-SCNC: 4.1 MMOL/L (ref 3.5–5.3)
POTASSIUM SERPL-SCNC: 3.7 MMOL/L (ref 3.5–5.3)
PROT SERPL-MCNC: 5 G/DL (ref 6.4–8.2)
RBC # BLD AUTO: 3.76 X10*6/UL (ref 4.5–5.9)
SAO2 % BLDA: 82 % (ref 94–100)
SAO2 % BLDA: 93 % (ref 94–100)
SAO2 % BLDA: 97 % (ref 94–100)
SAO2 % BLDA: 98 % (ref 94–100)
SAO2 % BLDV: 42 % (ref 45–75)
SODIUM BLDA-SCNC: 139 MMOL/L (ref 136–145)
SODIUM BLDA-SCNC: 141 MMOL/L (ref 136–145)
SODIUM BLDA-SCNC: 143 MMOL/L (ref 136–145)
SODIUM BLDA-SCNC: 145 MMOL/L (ref 136–145)
SODIUM BLDV-SCNC: 162 MMOL/L (ref 136–145)
SODIUM SERPL-SCNC: 145 MMOL/L (ref 136–145)
WBC # BLD AUTO: 18.9 X10*3/UL (ref 4.4–11.3)

## 2024-03-20 PROCEDURE — 2500000005 HC RX 250 GENERAL PHARMACY W/O HCPCS

## 2024-03-20 PROCEDURE — 99232 SBSQ HOSP IP/OBS MODERATE 35: CPT

## 2024-03-20 PROCEDURE — 36620 INSERTION CATHETER ARTERY: CPT | Performed by: STUDENT IN AN ORGANIZED HEALTH CARE EDUCATION/TRAINING PROGRAM

## 2024-03-20 PROCEDURE — 2500000001 HC RX 250 WO HCPCS SELF ADMINISTERED DRUGS (ALT 637 FOR MEDICARE OP)

## 2024-03-20 PROCEDURE — 93010 ELECTROCARDIOGRAM REPORT: CPT | Performed by: INTERNAL MEDICINE

## 2024-03-20 PROCEDURE — 94799 UNLISTED PULMONARY SVC/PX: CPT

## 2024-03-20 PROCEDURE — 71045 X-RAY EXAM CHEST 1 VIEW: CPT

## 2024-03-20 PROCEDURE — 2500000004 HC RX 250 GENERAL PHARMACY W/ HCPCS (ALT 636 FOR OP/ED)

## 2024-03-20 PROCEDURE — 94002 VENT MGMT INPAT INIT DAY: CPT

## 2024-03-20 PROCEDURE — 36415 COLL VENOUS BLD VENIPUNCTURE: CPT

## 2024-03-20 PROCEDURE — 2500000002 HC RX 250 W HCPCS SELF ADMINISTERED DRUGS (ALT 637 FOR MEDICARE OP, ALT 636 FOR OP/ED)

## 2024-03-20 PROCEDURE — 82435 ASSAY OF BLOOD CHLORIDE: CPT

## 2024-03-20 PROCEDURE — 82947 ASSAY GLUCOSE BLOOD QUANT: CPT

## 2024-03-20 PROCEDURE — 36415 COLL VENOUS BLD VENIPUNCTURE: CPT | Performed by: STUDENT IN AN ORGANIZED HEALTH CARE EDUCATION/TRAINING PROGRAM

## 2024-03-20 PROCEDURE — 93005 ELECTROCARDIOGRAM TRACING: CPT

## 2024-03-20 PROCEDURE — 97530 THERAPEUTIC ACTIVITIES: CPT | Mod: GO

## 2024-03-20 PROCEDURE — 2500000005 HC RX 250 GENERAL PHARMACY W/O HCPCS: Performed by: STUDENT IN AN ORGANIZED HEALTH CARE EDUCATION/TRAINING PROGRAM

## 2024-03-20 PROCEDURE — 92950 HEART/LUNG RESUSCITATION CPR: CPT

## 2024-03-20 PROCEDURE — 0BH17EZ INSERTION OF ENDOTRACHEAL AIRWAY INTO TRACHEA, VIA NATURAL OR ARTIFICIAL OPENING: ICD-10-PCS | Performed by: STUDENT IN AN ORGANIZED HEALTH CARE EDUCATION/TRAINING PROGRAM

## 2024-03-20 PROCEDURE — 5A1955Z RESPIRATORY VENTILATION, GREATER THAN 96 CONSECUTIVE HOURS: ICD-10-PCS | Performed by: STUDENT IN AN ORGANIZED HEALTH CARE EDUCATION/TRAINING PROGRAM

## 2024-03-20 PROCEDURE — 94644 CONT INHLJ TX 1ST HOUR: CPT

## 2024-03-20 PROCEDURE — 82040 ASSAY OF SERUM ALBUMIN: CPT | Performed by: STUDENT IN AN ORGANIZED HEALTH CARE EDUCATION/TRAINING PROGRAM

## 2024-03-20 PROCEDURE — 83735 ASSAY OF MAGNESIUM: CPT

## 2024-03-20 PROCEDURE — 3E043XZ INTRODUCTION OF VASOPRESSOR INTO CENTRAL VEIN, PERCUTANEOUS APPROACH: ICD-10-PCS | Performed by: STUDENT IN AN ORGANIZED HEALTH CARE EDUCATION/TRAINING PROGRAM

## 2024-03-20 PROCEDURE — 36556 INSERT NON-TUNNEL CV CATH: CPT | Performed by: STUDENT IN AN ORGANIZED HEALTH CARE EDUCATION/TRAINING PROGRAM

## 2024-03-20 PROCEDURE — 05H433Z INSERTION OF INFUSION DEVICE INTO LEFT INNOMINATE VEIN, PERCUTANEOUS APPROACH: ICD-10-PCS | Performed by: STUDENT IN AN ORGANIZED HEALTH CARE EDUCATION/TRAINING PROGRAM

## 2024-03-20 PROCEDURE — 3E04317 INTRODUCTION OF OTHER THROMBOLYTIC INTO CENTRAL VEIN, PERCUTANEOUS APPROACH: ICD-10-PCS | Performed by: STUDENT IN AN ORGANIZED HEALTH CARE EDUCATION/TRAINING PROGRAM

## 2024-03-20 PROCEDURE — 71045 X-RAY EXAM CHEST 1 VIEW: CPT | Performed by: RADIOLOGY

## 2024-03-20 PROCEDURE — 94762 N-INVAS EAR/PLS OXIMTRY CONT: CPT

## 2024-03-20 PROCEDURE — 84132 ASSAY OF SERUM POTASSIUM: CPT

## 2024-03-20 PROCEDURE — 1200000002 HC GENERAL ROOM WITH TELEMETRY DAILY

## 2024-03-20 PROCEDURE — P9045 ALBUMIN (HUMAN), 5%, 250 ML: HCPCS | Mod: JZ

## 2024-03-20 PROCEDURE — 2500000001 HC RX 250 WO HCPCS SELF ADMINISTERED DRUGS (ALT 637 FOR MEDICARE OP): Performed by: NURSE PRACTITIONER

## 2024-03-20 PROCEDURE — 97530 THERAPEUTIC ACTIVITIES: CPT | Mod: GP

## 2024-03-20 PROCEDURE — 2500000004 HC RX 250 GENERAL PHARMACY W/ HCPCS (ALT 636 FOR OP/ED): Performed by: ANESTHESIOLOGY

## 2024-03-20 PROCEDURE — 82330 ASSAY OF CALCIUM: CPT

## 2024-03-20 PROCEDURE — 85027 COMPLETE CBC AUTOMATED: CPT

## 2024-03-20 PROCEDURE — 99291 CRITICAL CARE FIRST HOUR: CPT | Performed by: STUDENT IN AN ORGANIZED HEALTH CARE EDUCATION/TRAINING PROGRAM

## 2024-03-20 PROCEDURE — 97110 THERAPEUTIC EXERCISES: CPT | Mod: GP

## 2024-03-20 PROCEDURE — 99231 SBSQ HOSP IP/OBS SF/LOW 25: CPT | Performed by: PHYSICIAN ASSISTANT

## 2024-03-20 PROCEDURE — 31500 INSERT EMERGENCY AIRWAY: CPT | Performed by: STUDENT IN AN ORGANIZED HEALTH CARE EDUCATION/TRAINING PROGRAM

## 2024-03-20 PROCEDURE — 97535 SELF CARE MNGMENT TRAINING: CPT | Mod: GO

## 2024-03-20 PROCEDURE — 2500000004 HC RX 250 GENERAL PHARMACY W/ HCPCS (ALT 636 FOR OP/ED): Performed by: STUDENT IN AN ORGANIZED HEALTH CARE EDUCATION/TRAINING PROGRAM

## 2024-03-20 PROCEDURE — 84484 ASSAY OF TROPONIN QUANT: CPT | Performed by: ANESTHESIOLOGY

## 2024-03-20 PROCEDURE — 31500 INSERT EMERGENCY AIRWAY: CPT | Mod: GC | Performed by: STUDENT IN AN ORGANIZED HEALTH CARE EDUCATION/TRAINING PROGRAM

## 2024-03-20 PROCEDURE — 83880 ASSAY OF NATRIURETIC PEPTIDE: CPT | Performed by: STUDENT IN AN ORGANIZED HEALTH CARE EDUCATION/TRAINING PROGRAM

## 2024-03-20 PROCEDURE — 94681 O2 UPTK CO2 OUTP % O2 XTRC: CPT

## 2024-03-20 PROCEDURE — 31500 INSERT EMERGENCY AIRWAY: CPT

## 2024-03-20 PROCEDURE — 74018 RADEX ABDOMEN 1 VIEW: CPT

## 2024-03-20 PROCEDURE — 74018 RADEX ABDOMEN 1 VIEW: CPT | Performed by: RADIOLOGY

## 2024-03-20 PROCEDURE — 5A12012 PERFORMANCE OF CARDIAC OUTPUT, SINGLE, MANUAL: ICD-10-PCS | Performed by: STUDENT IN AN ORGANIZED HEALTH CARE EDUCATION/TRAINING PROGRAM

## 2024-03-20 RX ORDER — INDOMETHACIN 25 MG/1
CAPSULE ORAL CODE/TRAUMA/SEDATION MEDICATION
Status: COMPLETED | OUTPATIENT
Start: 2024-03-20 | End: 2024-03-20

## 2024-03-20 RX ORDER — PANTOPRAZOLE SODIUM 40 MG/10ML
40 INJECTION, POWDER, LYOPHILIZED, FOR SOLUTION INTRAVENOUS DAILY
Status: DISCONTINUED | OUTPATIENT
Start: 2024-03-21 | End: 2024-03-21

## 2024-03-20 RX ORDER — NOREPINEPHRINE BITARTRATE/D5W 8 MG/250ML
PLASTIC BAG, INJECTION (ML) INTRAVENOUS
Status: COMPLETED
Start: 2024-03-20 | End: 2024-03-20

## 2024-03-20 RX ORDER — CALCIUM GLUCONATE 20 MG/ML
1 INJECTION, SOLUTION INTRAVENOUS EVERY 6 HOURS PRN
Status: DISCONTINUED | OUTPATIENT
Start: 2024-03-20 | End: 2024-04-26

## 2024-03-20 RX ORDER — HEPARIN SODIUM 5000 [USP'U]/ML
3000-6000 INJECTION, SOLUTION INTRAVENOUS; SUBCUTANEOUS EVERY 4 HOURS PRN
Status: DISCONTINUED | OUTPATIENT
Start: 2024-03-20 | End: 2024-03-21

## 2024-03-20 RX ORDER — HEPARIN SODIUM 5000 [USP'U]/ML
80 INJECTION, SOLUTION INTRAVENOUS; SUBCUTANEOUS ONCE
Status: DISCONTINUED | OUTPATIENT
Start: 2024-03-20 | End: 2024-03-21

## 2024-03-20 RX ORDER — EPOPROSTENOL 1.5 MG/10ML
0.01 INJECTION, POWDER, LYOPHILIZED, FOR SOLUTION INTRAVENOUS CONTINUOUS
Status: DISCONTINUED | OUTPATIENT
Start: 2024-03-20 | End: 2024-03-20

## 2024-03-20 RX ORDER — DEXTROSE 50 % IN WATER (D50W) INTRAVENOUS SYRINGE
25
Status: DISCONTINUED | OUTPATIENT
Start: 2024-03-20 | End: 2024-04-11

## 2024-03-20 RX ORDER — EPINEPHRINE HCL IN DEXTROSE 5% 4MG/250ML
0-2 PLASTIC BAG, INJECTION (ML) INTRAVENOUS CONTINUOUS
Status: DISCONTINUED | OUTPATIENT
Start: 2024-03-20 | End: 2024-03-20

## 2024-03-20 RX ORDER — NOREPINEPHRINE BITARTRATE/D5W 8 MG/250ML
PLASTIC BAG, INJECTION (ML) INTRAVENOUS
Status: COMPLETED | OUTPATIENT
Start: 2024-03-20 | End: 2024-03-20

## 2024-03-20 RX ORDER — NOREPINEPHRINE BITARTRATE/D5W 8 MG/250ML
0-3.3 PLASTIC BAG, INJECTION (ML) INTRAVENOUS CONTINUOUS
Status: DISCONTINUED | OUTPATIENT
Start: 2024-03-20 | End: 2024-03-21

## 2024-03-20 RX ORDER — ACETAMINOPHEN 10 MG/ML
1000 INJECTION, SOLUTION INTRAVENOUS EVERY 6 HOURS SCHEDULED
Status: DISCONTINUED | OUTPATIENT
Start: 2024-03-21 | End: 2024-03-21

## 2024-03-20 RX ORDER — ALBUMIN HUMAN 50 G/1000ML
12.5 SOLUTION INTRAVENOUS ONCE
Status: COMPLETED | OUTPATIENT
Start: 2024-03-21 | End: 2024-03-21

## 2024-03-20 RX ORDER — POTASSIUM CHLORIDE 29.8 MG/ML
40 INJECTION INTRAVENOUS EVERY 6 HOURS PRN
Status: DISCONTINUED | OUTPATIENT
Start: 2024-03-20 | End: 2024-03-27

## 2024-03-20 RX ORDER — CALCIUM GLUCONATE 20 MG/ML
2 INJECTION, SOLUTION INTRAVENOUS EVERY 6 HOURS PRN
Status: DISCONTINUED | OUTPATIENT
Start: 2024-03-20 | End: 2024-04-26

## 2024-03-20 RX ORDER — AMIODARONE HYDROCHLORIDE 150 MG/3ML
INJECTION, SOLUTION INTRAVENOUS CODE/TRAUMA/SEDATION MEDICATION
Status: COMPLETED | OUTPATIENT
Start: 2024-03-20 | End: 2024-03-20

## 2024-03-20 RX ORDER — HYDROMORPHONE HYDROCHLORIDE 1 MG/ML
0.5 INJECTION, SOLUTION INTRAMUSCULAR; INTRAVENOUS; SUBCUTANEOUS ONCE
Status: COMPLETED | OUTPATIENT
Start: 2024-03-20 | End: 2024-03-20

## 2024-03-20 RX ORDER — POTASSIUM CHLORIDE 14.9 MG/ML
20 INJECTION INTRAVENOUS EVERY 6 HOURS PRN
Status: DISCONTINUED | OUTPATIENT
Start: 2024-03-20 | End: 2024-03-27

## 2024-03-20 RX ORDER — EPINEPHRINE 0.1 MG/ML
INJECTION INTRACARDIAC; INTRAVENOUS CODE/TRAUMA/SEDATION MEDICATION
Status: COMPLETED | OUTPATIENT
Start: 2024-03-20 | End: 2024-03-20

## 2024-03-20 RX ORDER — HEPARIN SODIUM 5000 [USP'U]/ML
3000-6000 INJECTION, SOLUTION INTRAVENOUS; SUBCUTANEOUS EVERY 4 HOURS PRN
Status: DISCONTINUED | OUTPATIENT
Start: 2024-03-20 | End: 2024-03-20

## 2024-03-20 RX ORDER — HEPARIN SODIUM 10000 [USP'U]/100ML
0-4500 INJECTION, SOLUTION INTRAVENOUS CONTINUOUS
Status: DISCONTINUED | OUTPATIENT
Start: 2024-03-20 | End: 2024-03-21

## 2024-03-20 RX ORDER — PROPOFOL 10 MG/ML
5-20 INJECTION, EMULSION INTRAVENOUS CONTINUOUS
Status: DISCONTINUED | OUTPATIENT
Start: 2024-03-20 | End: 2024-03-22

## 2024-03-20 RX ORDER — ALBUMIN HUMAN 50 G/1000ML
12.5 SOLUTION INTRAVENOUS ONCE
Status: COMPLETED | OUTPATIENT
Start: 2024-03-20 | End: 2024-03-20

## 2024-03-20 RX ORDER — HYDROMORPHONE HYDROCHLORIDE 1 MG/ML
0.2 INJECTION, SOLUTION INTRAMUSCULAR; INTRAVENOUS; SUBCUTANEOUS EVERY 4 HOURS PRN
Status: DISCONTINUED | OUTPATIENT
Start: 2024-03-20 | End: 2024-03-25

## 2024-03-20 RX ORDER — HEPARIN SODIUM 5000 [USP'U]/ML
10000 INJECTION, SOLUTION INTRAVENOUS; SUBCUTANEOUS ONCE
Status: DISCONTINUED | OUTPATIENT
Start: 2024-03-20 | End: 2024-03-21

## 2024-03-20 RX ORDER — INSULIN LISPRO 100 [IU]/ML
0-10 INJECTION, SOLUTION INTRAVENOUS; SUBCUTANEOUS EVERY 4 HOURS
Status: DISCONTINUED | OUTPATIENT
Start: 2024-03-20 | End: 2024-03-21

## 2024-03-20 RX ORDER — PROPOFOL 10 MG/ML
INJECTION, EMULSION INTRAVENOUS
Status: COMPLETED
Start: 2024-03-20 | End: 2024-03-20

## 2024-03-20 RX ORDER — ALBUMIN HUMAN 50 G/1000ML
SOLUTION INTRAVENOUS
Status: COMPLETED
Start: 2024-03-20 | End: 2024-03-20

## 2024-03-20 RX ORDER — MIDAZOLAM HYDROCHLORIDE 1 MG/ML
1 INJECTION INTRAMUSCULAR; INTRAVENOUS ONCE
Status: DISCONTINUED | OUTPATIENT
Start: 2024-03-20 | End: 2024-03-21

## 2024-03-20 RX ORDER — DEXTROSE 50 % IN WATER (D50W) INTRAVENOUS SYRINGE
25
Status: DISCONTINUED | OUTPATIENT
Start: 2024-03-20 | End: 2024-03-28

## 2024-03-20 RX ORDER — HEPARIN SODIUM 10000 [USP'U]/100ML
0-4500 INJECTION, SOLUTION INTRAVENOUS CONTINUOUS
Status: DISCONTINUED | OUTPATIENT
Start: 2024-03-20 | End: 2024-03-20

## 2024-03-20 RX ORDER — HEPARIN SODIUM 10000 [USP'U]/100ML
INJECTION, SOLUTION INTRAVENOUS
Status: COMPLETED
Start: 2024-03-20 | End: 2024-03-20

## 2024-03-20 RX ORDER — DEXTROSE 50 % IN WATER (D50W) INTRAVENOUS SYRINGE
12.5
Status: DISCONTINUED | OUTPATIENT
Start: 2024-03-20 | End: 2024-03-28

## 2024-03-20 RX ORDER — MAGNESIUM SULFATE HEPTAHYDRATE 40 MG/ML
4 INJECTION, SOLUTION INTRAVENOUS EVERY 6 HOURS PRN
Status: DISCONTINUED | OUTPATIENT
Start: 2024-03-20 | End: 2024-04-26

## 2024-03-20 RX ORDER — ATROPINE SULFATE 0.1 MG/ML
INJECTION INTRAVENOUS CODE/TRAUMA/SEDATION MEDICATION
Status: COMPLETED | OUTPATIENT
Start: 2024-03-20 | End: 2024-03-20

## 2024-03-20 RX ORDER — MIDAZOLAM HYDROCHLORIDE 1 MG/ML
INJECTION INTRAMUSCULAR; INTRAVENOUS
Status: DISPENSED
Start: 2024-03-20 | End: 2024-03-21

## 2024-03-20 RX ORDER — MAGNESIUM SULFATE HEPTAHYDRATE 40 MG/ML
2 INJECTION, SOLUTION INTRAVENOUS EVERY 6 HOURS PRN
Status: DISCONTINUED | OUTPATIENT
Start: 2024-03-20 | End: 2024-04-26

## 2024-03-20 RX ORDER — HEPARIN SODIUM 5000 [USP'U]/ML
80 INJECTION, SOLUTION INTRAVENOUS; SUBCUTANEOUS ONCE
Status: DISCONTINUED | OUTPATIENT
Start: 2024-03-20 | End: 2024-03-20

## 2024-03-20 RX ADMIN — ACETAMINOPHEN 650 MG: 325 TABLET ORAL at 00:39

## 2024-03-20 RX ADMIN — HYDROMORPHONE HYDROCHLORIDE 0.5 MG: 1 INJECTION, SOLUTION INTRAMUSCULAR; INTRAVENOUS; SUBCUTANEOUS at 19:32

## 2024-03-20 RX ADMIN — GABAPENTIN 300 MG: 300 CAPSULE ORAL at 00:39

## 2024-03-20 RX ADMIN — VASOPRESSIN 0.06 UNITS/MIN: 0.2 INJECTION INTRAVENOUS at 20:42

## 2024-03-20 RX ADMIN — NOREPINEPHRINE BITARTRATE 0.2 MCG/KG/MIN: 8 INJECTION, SOLUTION INTRAVENOUS at 19:36

## 2024-03-20 RX ADMIN — ATROPINE SULFATE 1 MG: 0.1 INJECTION, SOLUTION INTRAVENOUS at 17:32

## 2024-03-20 RX ADMIN — ALBUMIN HUMAN 12.5 G: 0.05 INJECTION, SOLUTION INTRAVENOUS at 21:57

## 2024-03-20 RX ADMIN — PROPOFOL 25 MCG/KG/MIN: 10 INJECTION, EMULSION INTRAVENOUS at 23:46

## 2024-03-20 RX ADMIN — PROPOFOL 25 MCG/KG/MIN: 10 INJECTION, EMULSION INTRAVENOUS at 19:34

## 2024-03-20 RX ADMIN — INSULIN HUMAN 10 UNITS/HR: 1 INJECTION, SOLUTION INTRAVENOUS at 22:39

## 2024-03-20 RX ADMIN — INSULIN LISPRO 4 UNITS: 100 INJECTION, SOLUTION INTRAVENOUS; SUBCUTANEOUS at 12:56

## 2024-03-20 RX ADMIN — ALBUMIN HUMAN 12.5 G: 50 SOLUTION INTRAVENOUS at 21:57

## 2024-03-20 RX ADMIN — METFORMIN HYDROCHLORIDE 1000 MG: 1000 TABLET ORAL at 08:57

## 2024-03-20 RX ADMIN — SODIUM BICARBONATE 50 MEQ: 84 INJECTION, SOLUTION INTRAVENOUS at 17:45

## 2024-03-20 RX ADMIN — ACETAMINOPHEN 650 MG: 325 TABLET ORAL at 07:30

## 2024-03-20 RX ADMIN — HEPARIN SODIUM 1800 UNITS/HR: 10000 INJECTION, SOLUTION INTRAVENOUS at 23:34

## 2024-03-20 RX ADMIN — HYDROMORPHONE HYDROCHLORIDE 0.2 MG: 1 INJECTION, SOLUTION INTRAMUSCULAR; INTRAVENOUS; SUBCUTANEOUS at 23:34

## 2024-03-20 RX ADMIN — EPINEPHRINE 1 MG: 0.1 INJECTION INTRAVENOUS at 17:52

## 2024-03-20 RX ADMIN — Medication 0.05 MCG/KG/MIN: at 23:47

## 2024-03-20 RX ADMIN — INSULIN LISPRO 8 UNITS: 100 INJECTION, SOLUTION INTRAVENOUS; SUBCUTANEOUS at 20:48

## 2024-03-20 RX ADMIN — EPINEPHRINE 1 MG: 0.1 INJECTION INTRAVENOUS at 17:40

## 2024-03-20 RX ADMIN — EPINEPHRINE 1 MG: 0.1 INJECTION INTRAVENOUS at 17:48

## 2024-03-20 RX ADMIN — INSULIN LISPRO 4 UNITS: 100 INJECTION, SOLUTION INTRAVENOUS; SUBCUTANEOUS at 08:57

## 2024-03-20 RX ADMIN — Medication 1 TABLET: at 08:56

## 2024-03-20 RX ADMIN — ATROPINE SULFATE 1 MG: 0.1 INJECTION, SOLUTION INTRAVENOUS at 17:40

## 2024-03-20 RX ADMIN — METFORMIN HYDROCHLORIDE 1500 MG: 1000 TABLET ORAL at 00:41

## 2024-03-20 RX ADMIN — Medication 1 MCG/KG/MIN: at 17:56

## 2024-03-20 RX ADMIN — METHOCARBAMOL 500 MG: 500 TABLET ORAL at 12:16

## 2024-03-20 RX ADMIN — GABAPENTIN 300 MG: 300 CAPSULE ORAL at 08:57

## 2024-03-20 RX ADMIN — METHOCARBAMOL 500 MG: 500 TABLET ORAL at 07:30

## 2024-03-20 RX ADMIN — EPINEPHRINE 1 MG: 0.1 INJECTION INTRAVENOUS at 17:24

## 2024-03-20 RX ADMIN — METHOCARBAMOL 500 MG: 500 TABLET ORAL at 00:39

## 2024-03-20 RX ADMIN — SODIUM BICARBONATE 50 MEQ: 84 INJECTION, SOLUTION INTRAVENOUS at 17:41

## 2024-03-20 RX ADMIN — ACETAMINOPHEN 650 MG: 325 TABLET ORAL at 12:16

## 2024-03-20 RX ADMIN — POTASSIUM CHLORIDE 40 MEQ: 400 INJECTION, SOLUTION INTRAVENOUS at 22:13

## 2024-03-20 RX ADMIN — EPINEPHRINE 1 MG: 0.1 INJECTION INTRAVENOUS at 17:32

## 2024-03-20 RX ADMIN — DOCUSATE SODIUM 100 MG: 100 CAPSULE, LIQUID FILLED ORAL at 08:57

## 2024-03-20 RX ADMIN — AMIODARONE HYDROCHLORIDE 300 MG: 50 INJECTION, SOLUTION INTRAVENOUS at 17:36

## 2024-03-20 RX ADMIN — EPINEPHRINE 1 MG: 0.1 INJECTION INTRAVENOUS at 17:44

## 2024-03-20 RX ADMIN — ASPIRIN 81 MG: 81 TABLET, COATED ORAL at 08:57

## 2024-03-20 RX ADMIN — EPINEPHRINE 0.8 MCG/KG/MIN: 1 INJECTION INTRAMUSCULAR; INTRAVENOUS; SUBCUTANEOUS at 19:37

## 2024-03-20 RX ADMIN — Medication: at 23:49

## 2024-03-20 ASSESSMENT — COGNITIVE AND FUNCTIONAL STATUS - GENERAL
MOVING TO AND FROM BED TO CHAIR: TOTAL
TOILETING: A LOT
WALKING IN HOSPITAL ROOM: TOTAL
MOBILITY SCORE: 8
HELP NEEDED FOR BATHING: A LOT
HELP NEEDED FOR BATHING: A LOT
MOVING FROM LYING ON BACK TO SITTING ON SIDE OF FLAT BED WITH BEDRAILS: A LOT
DRESSING REGULAR UPPER BODY CLOTHING: A LITTLE
MOBILITY SCORE: 8
EATING MEALS: A LITTLE
TURNING FROM BACK TO SIDE WHILE IN FLAT BAD: A LOT
CLIMB 3 TO 5 STEPS WITH RAILING: TOTAL
PERSONAL GROOMING: A LOT
TURNING FROM BACK TO SIDE WHILE IN FLAT BAD: A LOT
MOVING TO AND FROM BED TO CHAIR: TOTAL
DRESSING REGULAR LOWER BODY CLOTHING: A LOT
DRESSING REGULAR UPPER BODY CLOTHING: A LOT
WALKING IN HOSPITAL ROOM: TOTAL
CLIMB 3 TO 5 STEPS WITH RAILING: TOTAL
TOILETING: A LOT
STANDING UP FROM CHAIR USING ARMS: TOTAL
MOVING FROM LYING ON BACK TO SITTING ON SIDE OF FLAT BED WITH BEDRAILS: A LOT
PERSONAL GROOMING: A LITTLE
DAILY ACTIVITIY SCORE: 16
DRESSING REGULAR LOWER BODY CLOTHING: A LOT
STANDING UP FROM CHAIR USING ARMS: TOTAL
DAILY ACTIVITIY SCORE: 13

## 2024-03-20 ASSESSMENT — PAIN SCALES - GENERAL
PAINLEVEL_OUTOF10: 4
PAINLEVEL_OUTOF10: 5 - MODERATE PAIN
PAINLEVEL_OUTOF10: 3
PAINLEVEL_OUTOF10: 5 - MODERATE PAIN
PAINLEVEL_OUTOF10: 4

## 2024-03-20 ASSESSMENT — PAIN - FUNCTIONAL ASSESSMENT
PAIN_FUNCTIONAL_ASSESSMENT: CPOT (CRITICAL CARE PAIN OBSERVATION TOOL)
PAIN_FUNCTIONAL_ASSESSMENT: CPOT (CRITICAL CARE PAIN OBSERVATION TOOL)
PAIN_FUNCTIONAL_ASSESSMENT: 0-10
PAIN_FUNCTIONAL_ASSESSMENT: 0-10
PAIN_FUNCTIONAL_ASSESSMENT: CPOT (CRITICAL CARE PAIN OBSERVATION TOOL)
PAIN_FUNCTIONAL_ASSESSMENT: 0-10

## 2024-03-20 ASSESSMENT — PAIN DESCRIPTION - LOCATION: LOCATION: KNEE

## 2024-03-20 ASSESSMENT — ACTIVITIES OF DAILY LIVING (ADL): HOME_MANAGEMENT_TIME_ENTRY: 10

## 2024-03-20 ASSESSMENT — PAIN DESCRIPTION - DESCRIPTORS: DESCRIPTORS: ACHING

## 2024-03-20 NOTE — PROGRESS NOTES
Occupational Therapy    Occupational Therapy Treatment    Name: Jason Hollins  MRN: [id number]  : 1960  Date: 24  Time Calculation  Start Time: 1134  Stop Time: 1212  Time Calculation (min): 38 min    Assessment:  OT Assessment: difficulty I/ADLs, safety, fxnl mob  Prognosis: Good  Barriers to Discharge: None  Evaluation/Treatment Tolerance: Patient limited by fatigue  Medical Staff Made Aware: Yes  End of Session Communication: Bedside nurse  End of Session Patient Position: Bed, 3 rail up, Alarm off, caregiver present  Plan:  Treatment Interventions: ADL retraining, Functional transfer training, UE strengthening/ROM, Endurance training, Patient/family training, Equipment evaluation/education, Compensatory technique education  OT Frequency: 3 times per week  OT Discharge Recommendations: Moderate intensity level of continued care  Equipment Recommended upon Discharge:  (cardiac/recliner chair, WhW, BSC, tub bench, hip kit)  OT Recommended Transfer Status: Assist of 2  OT - OK to Discharge: Yes    Subjective   Previous Visit Info:  OT Last Visit  OT Received On: 24  General:  Extended time all mob  General  Reason for Referral: Right gluteal extraskeletal myxoid chondrosarcoma s/p wide resection with extensive tissue reconstruction, pedicle ALT, vastus lateralus flap, pedicle gluteal and periformis flap with incisional wound vac placement.  Past Medical History Relevant to Rehab: DM II, HLD, HTN, right gluteal extraskeletal myxoid chondrosarcoma  Family/Caregiver Present: No  Co-Treatment: PT  Co-Treatment Reason: maximize pt safety  Prior to Session Communication: Bedside nurse  Patient Position Received: Bed, 3 rail up, Alarm off, not on at start of session  General Comment: +orthostatic and dizzy post mobility in session RN notified and present  Precautions:  LE Weight Bearing Status:  (NWB RLE. Limited hip flexion of the right leg- OK to flex hip to get out of bed, use the toilet etcCan sit for  short intervals (15-20mins) with seat reclined and legs elevatedAble to flex at the kneeAble to abduct/adduct the hip.)  Medical Precautions: Fall precautions  Vitals:  Vital Signs  Heart Rate:  (seated 119, sup 89, 90)  SpO2: 95 %  BP:  (seated 126/84, sup post sit/stand 98/60, sup 112/67)  Pain Assessment:  Pain Assessment  Pain Assessment: 0-10  Pain Score: 4  Pain Location:  (RLE)     Objective   Activities of Daily Living: Feeding  Feeding Level of Assistance:  (drank I)       UE Dressing  UE Dressing Level of Assistance:  (min A adjust gown EOB)    LE Dressing  LE Dressing:  (max A adjust socks supine)        Bed Mobility/Transfers: Bed Mobility 1  Level of Assistance 1:  (sup/sit max A x2 extended time to att)  Bed Mobility 2  Level of Assistance 2:  (log rolling each way min A, bed change, max Ax2, 2x boost in bed)    Transfers  Transfer: Yes  Transfer 1  Transfer Level of Assistance 1:  (sit to stand mod A x2 whw cues  nwb RLE)       Sitting Balance:  Dynamic Sitting Balance  Dynamic Sitting-Balance:  (seated EOB ~10 min, CGA-MIN 2 UE support, pt reported dizziness sup/sit RN present)  Standing Balance:  Static Standing Balance  Static Standing-Level of Assistance:  (mod A x2 WHW)     Therapy/Activity: Therapeutic Activity  Therapeutic Activity Performed:  (education on POC and brain/body connection, healing, and progressing accordingly increased sitting balance and monitoring vitals and progression)     Outcome Measures:  [facility] Daily Activity  Putting on and taking off regular lower body clothing: A lot  Bathing (including washing, rinsing, drying): A lot  Putting on and taking off regular upper body clothing: A little  Toileting, which includes using toilet, bedpan or urinal: A lot  Taking care of personal grooming such as brushing teeth: A little  Eating Meals: None  Daily Activity - Total Score: 16        Education Documentation  Body Mechanics, taught by Nely Carlisle OT at 3/20/2024 12:31  PM.  Learner: Patient  Readiness: Acceptance  Method: Explanation  Response: Verbalizes Understanding    Precautions, taught by Nely Carlisle OT at 3/20/2024 12:31 PM.  Learner: Patient  Readiness: Acceptance  Method: Explanation  Response: Verbalizes Understanding    ADL Training, taught by Nely Carlisle OT at 3/20/2024 12:31 PM.  Learner: Patient  Readiness: Acceptance  Method: Explanation  Response: Verbalizes Understanding    Education Comments  No comments found.      Goals:  Encounter Problems       Encounter Problems (Active)       ADLs       Patient will perform UB and LB bathing  with stand by assist level of assistance and AE. (Progressing)       Start:  03/19/24    Expected End:  04/09/24            Patient with complete upper body dressing with independent level  (Progressing)       Start:  03/19/24    Expected End:  04/09/24            Patient with complete lower body dressing with minimal assist  level of assistance donning and doffing all LE clothes  with PRN adaptive equipment  (Progressing)       Start:  03/19/24    Expected End:  04/09/24            Patient will complete daily grooming tasks  with independent level  (Progressing)       Start:  03/19/24    Expected End:  04/09/24            Patient will complete toileting including hygiene clothing management/hygiene with moderate assist level of assistance and LRD. (Progressing)       Start:  03/19/24    Expected End:  04/09/24               EXERCISE/STRENGTHENING       Patient with increase BUE to WFL strength. (Progressing)       Start:  03/19/24    Expected End:  04/09/24               MOBILITY       Patient will perform Functional mobility min Household distances/Community Distances with moderate assist level of assistance and least restrictive device in order to improve safety and functional mobility. (Progressing)       Start:  03/19/24    Expected End:  04/09/24               TRANSFERS       Patient will perform bed mobility moderate  assist level of assistance and bed rails in order to improve safety and independence with mobility (Progressing)       Start:  03/19/24    Expected End:  04/09/24            Patient will complete functional transfer with least restrictive device with moderate assist level of assistance. (Progressing)       Start:  03/19/24    Expected End:  04/09/24

## 2024-03-20 NOTE — PROGRESS NOTES
"Jason Hollins is a 63 y.o. male on day 15 of admission presenting with Soft tissue sarcoma (CMS/HCC).    Subjective   Pt seen and examined from outside of his room while in CTICU receiving emergent intervention.      I have reviewed histories, allergies and medications have been reviewed        Objective   Review of Systems   Reason unable to perform ROS: pt unable to interview.     Physical Exam  Vitals reviewed.   Constitutional:       General: He is in acute distress.      Appearance: He is obese. He is ill-appearing.      Comments: Examination limited to remote visual review while receiving acute intervention   HENT:      Head: Normocephalic and atraumatic.      Mouth/Throat:      Mouth: Mucous membranes are moist.   Musculoskeletal:      Comments:  Appropriate to procedure with multiple ANDERSON drains in place surrounding surgical site          Last Recorded Vitals  Blood pressure (!) 62/48, pulse 78, temperature 36.9 °C (98.5 °F), temperature source Temporal, resp. rate (!) 27, height 1.778 m (5' 10\"), weight 101 kg (222 lb 14.2 oz), SpO2 (!) 89 %.  Intake/Output last 3 Shifts:  I/O last 3 completed shifts:  In: 475 (4.7 mL/kg) [P.O.:475]  Out: 846 (8.4 mL/kg) [Urine:846 (0.2 mL/kg/hr)]  Weight: 101.1 kg     Relevant Results  Results from last 7 days   Lab Units 03/20/24  1721 03/20/24  1705 03/20/24  1216 03/20/24  0858 03/19/24  2218 03/15/24  0955 03/15/24  0727   POCT GLUCOSE mg/dL 178* 130* 144* 154* 216*   < >  --    GLUCOSE mg/dL  --   --   --   --   --   --  146*    < > = values in this interval not displayed.     Lab Review  Lab Results   Component Value Date    BILITOT 0.8 02/13/2024    CALCIUM 9.2 03/15/2024    CO2 24 03/15/2024     (H) 03/15/2024    CREATININE 0.60 03/15/2024    GLUCOSE 146 (H) 03/15/2024    ALKPHOS 79 02/13/2024    K 3.8 03/15/2024    PROT 6.9 02/13/2024     03/15/2024    AST 25 02/13/2024    ALT 37 02/13/2024    BUN 17 03/15/2024    ANIONGAP 14 03/15/2024    MG 1.69 03/09/2024 "    PHOS 3.3 03/15/2024    GGT 21 02/13/2024     02/13/2024    ALBUMIN 3.6 03/15/2024    GFRMALE >90 09/01/2023     Lab Results   Component Value Date    TRIG 90 12/30/2022    CHOL 89 12/30/2022    HDL 31.5 (A) 12/30/2022     Lab Results   Component Value Date    HGBA1C 7.5 (H) 02/28/2024    HGBA1C 7.1 (H) 11/28/2023    HGBA1C 7.7 (A) 09/01/2023     The ASCVD Risk score (Ravi ENAMORADO, et al., 2019) failed to calculate for the following reasons:    The valid systolic blood pressure range is 90 to 200 mmHg    The valid total cholesterol range is 130 to 320 mg/dL    Scheduled medications  insulin lispro, 0-10 Units, subcutaneous, q4h  midazolam, , ,   midazolam, 1 mg, intravenous, Once  norepinephrine in dextrose 5 %, , ,   oxygen, , inhalation, Continuous - Inhalation  vasopressin, , ,       Continuous medications  EPINEPHrine, 0-2 mcg/kg/min  norepinephrine, 0-3.3 mcg/kg/min  vasopressin, 0.01-0.06 Units/min      PRN medications  PRN medications: calcium gluconate, calcium gluconate, dextrose, dextrose, glucagon, magnesium sulfate, magnesium sulfate, midazolam, norepinephrine in dextrose 5 %, vasopressin        Assessment/Plan   Principal Problem:    Soft tissue sarcoma (CMS/HCC)  Active Problems:    Extraskeletal myxoid chondrosarcoma (CMS/HCC)    DM2 with post-operative hyperglycemia      History Of Present Illness  Jason Hollins is a 63 y.o. male presenting with a past medical history significant for DM II, HPL, HTN, and gluteal myxoid chondrosarcoma s/p radiation. He is now S/P wide resection of gluteal sarcoma, sciatic nerve neurolysis (Per ortho) and right gluteal reconstruction, pedicle ALT, vastus lateralus flap, pedicle gluteal and perisformis flap, pelvic closure, and incisional wound vac placement (Per plastics) on 3/5/24 with Dr. Hwakins and Dr. Smith.     Diabetes History  Outpatient provider for endocrine care PCP, date of last visit >6 months ago  Initial diabetes diagnosis was made (year/age) several  years ago  Known complications due to diabetes include: obesity and hyperglycemia     Home Management  Metformin 2500mg daily  Trulicity 4.5mg weekly        PLAN  - Goal BG <180  - continue glargine 12u at bedtime  - continue lispro 4u with meals  - continue lispro corrective scale #2 with meals   = 0u  151-200 = 2u  201-250 = 4u  251-300 = 6u  301-350 = 8u  351-400 = 10u  - continue metformin 1000mg AM and 1500mg PM     -Accuchecks (not BMP) TIDAC and QHS- kindly ensure QHS Accucheck is drawn; it is often missed   -Hypoglycemia protocol  -Diabetes Diet- low carb 60 CHO  -Will continue to follow and titrate insulin accordingly     Dispo: pt can expect to discharge on metformin, home trulicity 4.5mg, and likely start of new oral anti-diabetic medication. So long as daily insulin requirements remain <20u, we can expect to discharge without insulin.        I spent 15 minutes in the professional and overall care of this patient.      Julia Moya PA-C

## 2024-03-20 NOTE — PROGRESS NOTES
Jason Hollins is a 63 y.o. male on day 15 of admission presenting with Soft tissue sarcoma (CMS/HCC).    Transitional Care Coordinator Note: Met with patient to discuss discharge planning s/p admission. Spoke with the patient's spouse who provided choices, 1. [facility], 2. [facility] 3. [facility]. Patient is currently on bedrest until 3/19. TCC team will continue to follow. Chanel Lama RN TCC via Epic.    3/19/24  1633  Pt has been accepted at [facility].  SW relayed above to pt.  He would like to explore [facility] and [facility].  [facility] unable to accept due to only accepting pt 65+.  [facility] asking about cancer treatment plan while at SNF.  Will reach out to team for answers to questions.  Will follow.  COOPER Francois      3/20/24  1103  Pt has been accepted at [facility].  SW relayed this information to pt.  He is going to speak with his family.  Pt also reports he wants to get out of bed with PT/OT as therapy was not able to get out of bed 3/19.  [facility] is another accepting SNF.  Will sent therapy notes to both facilities.  COOPER Francois

## 2024-03-20 NOTE — SIGNIFICANT EVENT
Department of Plastic and Reconstructive Surgery    1200: Received call from floor RN that while working with PT, patient stood up and became slightly dizzy, BP 98/60, SP02 95, patient returned to bed and dizzyness resolved. BP returned to 112/67 after lying down.     1335: Went to assess patient, denies any acute concerns. Lying in bed with family present. Denies SOB, chest pain, dizziness. Patient feels baseline after returning to bed. Discussed this may be positional changes after bedrest x2 weeks along with generalized deconditioning and weakness. Encouraged PO intake of fluids. Patient and family express understanding. Most recent set of vitals HR: 95, BP: 118/73, O2: 94%, consistent with baseline since admission. We continued to monitor at this time.     1700: Rounding with Dr. Smith, patient assessed at bedside, had attempted to sit up on side of bed, patient became dizzy again. He denied shortness of breath and chest pain, vitals obtained HR: 115, O2: 94 on RA, /90, patient placed back to bed in supine position. Within a few minutes while in room speaking with patient, he became lethargic, slow to respond. Rapid response called at this time, which was  escalated to Code Blue. Code team present at bedside. See code sheet for further details. Transferred to CTICU, further care per ICU. Family at bedside updated per Plastics/ Dr. Rosenbaum, APRN-CNP  Plastic and Reconstructive Surgery   Available via Epic chat, pager: [phone] or team phones: [phone]      I was present when the patient became dizzy at 1700. He was assisted back to bed side, placed supine, and became responsive. I talked to him, and he denied shortness of breath, chest pain, and vitals obtained were as mentioned. After that he collapsed, became unresponsive. I ordered 12 lead ECG, Rapid response was called and arrived, and his code was escalated to code blue. Code team was present at bed side. Please see Code  Blue documentation and code leader notes. Patient was then transferred to CTICU, further care per ICU. I updated the family at bed side, and spoke to his wife on phone.     Angy Hall M.D.

## 2024-03-20 NOTE — CARE PLAN
The clinical goals for the shift include Pt will work with PT this shift      Problem: Diabetes  Goal: Achieve decreasing blood glucose levels by end of shift  Outcome: Progressing  Goal: Increase stability of blood glucose readings by end of shift  Outcome: Progressing  Goal: Decrease in ketones present in urine by end of shift  Outcome: Progressing  Goal: Maintain electrolyte levels within acceptable range throughout shift  Outcome: Progressing  Goal: Maintain glucose levels >70mg/dl to <250mg/dl throughout shift  Outcome: Progressing  Goal: No changes in neurological exam by end of shift  Outcome: Progressing  Goal: Learn about and adhere to nutrition recommendations by end of shift  Outcome: Progressing  Goal: Vital signs within normal range for age by end of shift  Outcome: Progressing  Goal: Increase self care and/or family involovement by end of shift  Outcome: Progressing  Goal: Receive DSME education by end of shift  Outcome: Progressing     Problem: Pain  Goal: Takes deep breaths with improved pain control throughout the shift  Outcome: Progressing  Goal: Turns in bed with improved pain control throughout the shift  Outcome: Progressing  Goal: Walks with improved pain control throughout the shift  Outcome: Progressing  Goal: Performs ADL's with improved pain control throughout shift  Outcome: Progressing  Goal: Participates in PT with improved pain control throughout the shift  Outcome: Progressing  Goal: Free from opioid side effects throughout the shift  Outcome: Progressing  Goal: Free from acute confusion related to pain meds throughout the shift  Outcome: Progressing     Problem: Skin  Goal: Decreased wound size/increased tissue granulation at next dressing change  Outcome: Progressing  Flowsheets (Taken 3/20/2024 7624)  Decreased wound size/increased tissue granulation at next dressing change:   Protective dressings over bony prominences   Promote sleep for wound healing  Goal: Participates in  plan/prevention/treatment measures  Outcome: Progressing  Flowsheets (Taken 3/20/2024 1659)  Participates in plan/prevention/treatment measures:   Elevate heels   Increase activity/out of bed for meals  Goal: Prevent/manage excess moisture  Outcome: Progressing  Flowsheets (Taken 3/20/2024 1659)  Prevent/manage excess moisture:   Cleanse incontinence/protect with barrier cream   Monitor for/manage infection if present   Moisturize dry skin  Goal: Prevent/minimize sheer/friction injuries  Outcome: Progressing  Flowsheets (Taken 3/20/2024 1659)  Prevent/minimize sheer/friction injuries:   HOB 30 degrees or less   Increase activity/out of bed for meals   Turn/reposition every 2 hours/use positioning/transfer devices   Use pull sheet  Goal: Promote/optimize nutrition  Outcome: Progressing  Flowsheets (Taken 3/20/2024 1659)  Promote/optimize nutrition:   Consume > 50% meals/supplements   Monitor/record intake including meals   Offer water/supplements/favorite foods  Goal: Promote skin healing  Outcome: Progressing  Flowsheets (Taken 3/20/2024 1659)  Promote skin healing:   Assess skin/pad under line(s)/device(s)   Ensure correct size (line/device) and apply per  instructions   Protective dressings over bony prominences   Rotate device position/do not position patient on device   Turn/reposition every 2 hours/use positioning/transfer devices

## 2024-03-20 NOTE — PROGRESS NOTES
Physical Therapy    Physical Therapy Treatment    Patient Name: Jason Hollins  MRN: [id number]  Today's Date: 3/20/2024  Time Calculation  Start Time: 1112  Stop Time: 1212  Time Calculation (min): 60 min     Assessment/Plan   PT Assessment  PT Assessment Results: Decreased strength, Decreased range of motion, Decreased endurance, Impaired balance, Decreased mobility, Orthopedic restrictions, Pain  Rehab Prognosis: Good  Barriers to Discharge: none  Evaluation/Treatment Tolerance: Patient limited by fatigue (and dizziness)  Medical Staff Made Aware: Yes  Strengths: Ability to acquire knowledge, Attitude of self  End of Session Communication: Bedside nurse, Care Coordinator  Assessment Comment: The pt required max A x 2 to transfer supine to sit sit. Pt required min A in sitting and mod A x 2 to transfer sit to stand. Pt had c/o dizziness with upright positions that limited interventions. Session was completed as a co-treat to maximize safety.  End of Session Patient Position: Bed, 3 rail up, Alarm off, not on at start of session  PT Plan  Inpatient/Swing Bed or Outpatient: Inpatient  PT Plan  Treatment/Interventions: Bed mobility, Transfer training, Gait training, Stair training, Balance training, Strengthening, Endurance training, Range of motion, Therapeutic exercise, Therapeutic activity, Home exercise program, Positioning  PT Plan: Skilled PT  PT Frequency: 3 times per week  PT Discharge Recommendations: Moderate intensity level of continued care  Equipment Recommended upon Discharge:  (none)  PT Recommended Transfer Status: Assist x2  PT - OK to Discharge: Yes    General Visit Information:   PT  Visit  PT Received On: 03/20/24  Response to Previous Treatment: Patient with no complaints from previous session.  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  Past Medical History Relevant to Rehab: DM II, HLD, HTN, right gluteal extraskeletal myxoid chondrosarcoma  Missed Visit: No  Family/Caregiver Present: No  Co-Treatment: OT  Co-Treatment Reason: PT/OT co-tx for increased pt safety.  Prior to Session Communication: Bedside nurse, Care Coordinator  Patient Position Received: Bed, 3 rail up, Alarm off, not on at start of session  Preferred Learning Style: verbal, visual, written, auditory  General Comment: The pt was pleasant, cooperative and willing to participate in therapy.    Subjective   Precautions:  Precautions  Hearing/Visual Limitations: Hearing and vision WFL with reading glasses.  LE Weight Bearing Status: Right Non-Weight Bearing  Medical Precautions: Fall precautions  Precautions Comment: Pt in compliance with precautions throughout therapy session.  Vital Signs:  Vital Signs  Heart Rate: 118 (92 immediately after c/o dizziness, 92 after recovered in supine ~5 minutes)  Heart Rate Source: Monitor  SpO2: 98 % (95 in supine immediately after c/o dizziness, 95 after recovered in supine ~5 minutes)  BP: 126/84 (98/60 in supine after c/o of dizziness, 112/69 after recovery in supine ~5 minutes)  BP Location: Right arm  BP Method: Automatic  Patient Position: Sitting    Objective   Pain:  Pain Assessment  Pain Assessment: 0-10  Pain Score: 4  Pain Type: Acute pain  Pain Location: Knee  Pain Orientation: Right  Pain Interventions: Ambulation/increased activity  Response to Interventions: Pain increased with knee flexion, decreased with rest/extension  Cognition:  Cognition  Overall Cognitive Status: Within Functional Limits  Postural Control:  Static Sitting Balance  Static Sitting-Balance Support: Bilateral upper extremity supported, Feet supported  Static Sitting-Level of Assistance: Minimum assistance, Contact guard  Static Sitting-Comment/Number of Minutes: 10 minutes  Dynamic Sitting Balance  Dynamic Sitting-Balance  Support: Bilateral upper extremity supported  Dynamic Sitting-Balance: Forward lean  Dynamic Sitting-Comments: Min A-CG  Static Standing Balance  Static Standing-Balance Support: Bilateral upper extremity supported (with WW)  Static Standing-Level of Assistance: Moderate assistance (x2)  Dynamic Standing Balance  Dynamic Standing-Balance Support: Bilateral upper extremity supported (with WW)  Dynamic Standing-Comments: Mod A x 2  Extremity/Trunk Assessments:  Cervical Spine   Cervical Spine: Within Functional Limits  Lumbar Spine   Lumbar Spine : Within Functional Limits  RUE   RUE : Within Functional Limits  LUE   LUE: Within Functional Limits  RLE   RLE : Exceptions to WFL  AROM RLE (degrees)  RLE AROM Comment: decreased hip and knee flexion  Strength RLE  R Hip Flexion: 1/5  R Knee Flexion: 2/5  R Knee Extension: 2/5  R Ankle Dorsiflexion: 4/5  R Ankle Plantar Flexion: 4/5  LLE   LLE : Within Functional Limits    Activity Tolerance:  Activity Tolerance  Endurance: Decreased tolerance for upright activites  Activity Tolerance Comments: Increased c/o dizziness d/t decrease in BP as noted throughout session. Pt returned to normal status after a ~5 minutes in supine.  Treatments:  Therapeutic Exercise  Therapeutic Exercise Performed: Yes  Therapeutic Exercise Activity 1: MARY ankle pumps  Therapeutic Exercise Activity 2: MARY quad set  Therapeutic Exercise Activity 3: Hip Abduction with ankle/knee supported  Therapeutic Exercise Activity 4: Heel slides with ankle/knee supported (Decreased ROM on R side)    Bed Mobility  Bed Mobility: Yes  Bed Mobility 1  Bed Mobility 1: Supine to sitting  Level of Assistance 1: Maximum assistance (x2)  Bed Mobility Comments 1: HOB elevated, lateral transfer  Bed Mobility 2  Bed Mobility  2: Sitting to supine  Level of Assistance 2: Maximum assistance (x2)  Bed Mobility Comments 2: HOB elevated    Transfers  Transfer: Yes  Transfer 1  Transfer From 1: Sit to  Transfer to 1:  Stand  Transfer Device 1: Walker  Transfer Level of Assistance 1: Maximum assistance (x2)  Trials/Comments 1: Increased c/o dizziness, returned to sitting  Transfers 2  Transfer From 2: Stand to  Transfer to 2: Sit  Transfer Device 2: Walker  Transfer Level of Assistance 2: Maximum assistance (x2)    Outcome Measures:  [facility] Basic Mobility  Turning from your back to your side while in a flat bed without using bedrails: A lot  Moving from lying on your back to sitting on the side of a flat bed without using bedrails: A lot  Moving to and from bed to chair (including a wheelchair): Total  Standing up from a chair using your arms (e.g. wheelchair or bedside chair): Total  To walk in hospital room: Total  Climbing 3-5 steps with railing: Total  Basic Mobility - Total Score: 8    Education Documentation  Handouts, taught by CHIRS Newman at 3/20/2024 12:41 PM.  Learner: Patient  Readiness: Eager  Method: Explanation, Demonstration, Handout  Response: Verbalizes Understanding, Demonstrated Understanding    Precautions, taught by CHRIS Newman at 3/20/2024 12:41 PM.  Learner: Patient  Readiness: Eager  Method: Explanation, Demonstration, Handout  Response: Verbalizes Understanding, Demonstrated Understanding    Body Mechanics, taught by CHRIS Newman at 3/20/2024 12:41 PM.  Learner: Patient  Readiness: Eager  Method: Explanation, Demonstration, Handout  Response: Verbalizes Understanding, Demonstrated Understanding    Home Exercise Program, taught by CHRIS Newman at 3/20/2024 12:41 PM.  Learner: Patient  Readiness: Eager  Method: Explanation, Demonstration, Handout  Response: Verbalizes Understanding, Demonstrated Understanding    Mobility Training, taught by CHRIS Newman at 3/20/2024 12:41 PM.  Learner: Patient  Readiness: Eager  Method: Explanation, Demonstration, Handout  Response: Verbalizes Understanding, Demonstrated Understanding    Education Comments  No comments  found.      Encounter Problems       Encounter Problems (Active)       Balance       STG - Maintains SBA dynamic standing balance with upper extremity support using a wheeled walker. (Progressing)       Start:  03/19/24    Expected End:  04/02/24            STG - Maintains SBA static standing balance with upper extremity support using a wheeled walker. (Progressing)       Start:  03/19/24    Expected End:  04/02/24               Mobility       STG - Patient will ambulate 10ft using a wheeled walker with SBA. (Progressing)       Start:  03/19/24    Expected End:  04/02/24               PT Transfers       STG - Patient will transfer supine to and from stand with moderate assistance. (Met)       Start:  03/19/24    Expected End:  04/02/24    Resolved:  03/20/24    Updated to: STG - Patient will transfer supine to and from sit with moderate assistance.    Update reason: Pt demonstrated functional progression.          STG - Patient will transfer from bed to chair using a wheeled walker with SBA.  (Progressing)       Start:  03/20/24    Expected End:  04/02/24                STG - Patient will transfer supine to and from sit with moderate assistance. (Progressing)       Start:  03/20/24    Expected End:  04/02/24

## 2024-03-20 NOTE — PROGRESS NOTES
"    Department of Plastic and Reconstructive Surgery  Daily Progress Note    Patient Name: Jason Hollins  MRN: [id number]  Date:  03/20/24     Subjective  Found resting in bed comfortably. Denies any  acute concerns. Reports mild pain in right knee, which is relieved with ice pack. Pain otherwise well controlled. Tolerating diet  with no issues. Feels ready to get off bedrest today. Reports some lightheadedness upon getting out of bed with PT, relieved by lying back down. Denies any fever, chills, night sweats, CP, SOB, palpitations, nausea, vomiting, or abdominal pain/discomfort.    Overnight Events  NAOE    Objective  Constitutional: NAD, alert, awake, calm/cooperative  Eyes: EOMI, clear sclera  Head/Neck: NCAT  Cardiovascular: RRR  Respiratory/Thorax: Unlabored respirations on RA.  Gastrointestinal: Abdomen soft, ND/NT.   Musculoskeletal: NWB RLE. +SILT BLE. Intact RLE DP pulses. Compartments soft and compressible.   Neurological: A&Ox3.  Psychological: Appropriate mood and behavior.  Skin: Warm and dry, no rashes.  Extremity: Pedicle flap to right gluteal region appropriate in color. Pedicle flap warm to touch, soft and compressible Flap incisions healing appropriately, C/D/I. Noted radiation changes of tissue surrounding flap.      Vital Signs  /74 (BP Location: Right arm, Patient Position: Lying)   Pulse 81   Temp 36.6 °C (97.9 °F) (Temporal)   Resp 16   Ht 1.778 m (5' 10\")   Wt 101 kg (222 lb 14.2 oz)   SpO2 95%   BMI 31.98 kg/m²      Physical Exam     Diagnostics   Results for orders placed or performed during the hospital encounter of 03/05/24 (from the past 24 hour(s))   POCT GLUCOSE   Result Value Ref Range    POCT Glucose 121 (H) 74 - 99 mg/dL   POCT GLUCOSE   Result Value Ref Range    POCT Glucose 167 (H) 74 - 99 mg/dL   POCT GLUCOSE   Result Value Ref Range    POCT Glucose 216 (H) 74 - 99 mg/dL   POCT GLUCOSE   Result Value Ref Range    POCT Glucose 154 (H) 74 - 99 mg/dL     XR chest 1 " view    Result Date: 3/7/2024  Interpreted By:  Austin Ruiz, STUDY: XR CHEST 1 VIEW;  3/6/2024 11:00 pm   INDICATION: Signs/Symptoms:low grade fever, r/o PNA.   COMPARISON: CT chest 01/31/2024   ACCESSION NUMBER(S): [id number]   ORDERING CLINICIAN: BHARAT RANKIN   FINDINGS: AP radiograph of the chest   The patient is markedly rotated to the left. The heart is enlarged. No acute pulmonary infiltrates are noted. The left ventricle obscures the left costophrenic sulcus       1. No acute cardiopulmonary pathology       Signed by: Austin Ruiz 3/7/2024 8:09 AM Dictation workstation:   [id number]    CT angio aorta and bilateral iliofemoral runoff w and or wo IV contrast    Result Date: 2/28/2024  Interpreted By:  Elaine Maya, STUDY: CT ANGIO AORTA AND BILATERAL ILIOFEMORAL RUNOFF W AND OR WO IV CONTRAST;  2/28/2024 3:05 am   INDICATION: Signs/Symptoms:preop planning.   COMPARISON: MRI of the pelvis dated 01/31/2024   ACCESSION NUMBER(S): [id number]   ORDERING CLINICIAN: JARRED ADAMS   TECHNIQUE: Thin-section axial images of the abdomen, pelvis, bilateral lower extremities in the arterial phase after intravenous administration of lower osmolar agent  75 mL of Omnipaque 350 contrast.     CT Dose Reduction Employed: Yes, iterative reconstruction   For optimization of anatomic evaluation, multiplanar reconstruction, maximum intensity projections, and advanced 3-D off-line postprocessing were performed on a dedicated stand-alone workstation by the interpreting physician.   FINDINGS: VASCULATURE:   ABDOMINAL AORTA: No abdominal aortic aneurysm or dissection. No significant atherosclerosis. Mild tortuosity of the iliac vasculature is present. Celiac artery: No significant atherosclerotic calcification is noted along the celiac artery. The right hepatic artery appears to be originating from the SMA as a normal variant. Superior mesenteric artery: The superior mesenteric artery appears to be widely patent. As mentioned above,  the right hepatic artery is originating from the SMA as a normal variant. Right renal artery: 3 renal arteries are noted supplying the right kidney. Left renal artery: A single left-sided renal artery is noted.   The inferior mesenteric artery is widely patent.   ABDOMINAL AND PELVIC ARTERIES:   - Right Common Iliac Artery:  No atherosclerosis No hemodynamically significant stenosis. - Right External Iliac Artery:  No significant atherosclerosis No hemodynamically significant stenosis. - Right Internal Iliac Artery:  No significant atherosclerosis No hemodynamically significant stenosis. Branches of the right internal iliac artery appears to be in close proximity of the mass within the right gluteal region and likely providing supply via the inferior gluteal artery. The superior gluteal artery likely is not supplying the mass.   - Left Common Iliac Artery:  No atherosclerosis No hemodynamically significant stenosis. - Left External Iliac Artery:  No atherosclerosis No hemodynamically significant stenosis. - Left Internal Iliac Artery:  mild  atherosclerosis No hemodynamically significant stenosis.     RIGHT LOWER EXTREMITY:   - Common Femoral Artery:  Minimal atherosclerosis just before the bifurcation no hemodynamically significant stenosis. - Profunda Artery:  Minimal atherosclerosis No hemodynamically significant stenosis. Small branch of the profundus appears to be supplying the inferior aspect of the mass. - Superficial Femoral Artery:  mild atherosclerosis  No hemodynamically significant stenosis. - Popliteal Artery:  Mild-to-moderate atherosclerosis No hemodynamically significant stenosis. - Anterior Tibial:  mild atherosclerosis No hemodynamically significant stenosis. Is visualized toward the mid tibial shaft - Posterior Tibial:  Mild-to-moderate atherosclerosis No hemodynamically significant stenosis. Appears to be patent toward the foot, the distal aspect appears to be tortuous. - Peroneal Arteries:  Mild-to-moderate atherosclerosis No hemodynamically significant stenosis. Patent to foot   LEFT LOWER EXTREMITY:   - Common Femoral Artery:  Trace atherosclerosis No hemodynamically significant stenosis. - Profunda Artery:  mild atherosclerosis No hemodynamically significant stenosis. - Superficial Femoral Artery: mild atherosclerosis No hemodynamically significant stenosis. - Popliteal Artery:  mild atherosclerosis No hemodynamically significant stenosis. - Anterior Tibial:  Trace atherosclerosis No hemodynamically significant stenosis. Patent to foot - Posterior Tibial:  Mild-to-moderate atherosclerosis No hemodynamically significant stenosis. Patent to foot significant tortuosity of the artery distally at the level of the foot. - Peroneal Arteries: Mild-to-moderate atherosclerosis No hemodynamically significant stenosis. Visualized toward the mid fibular shaft   ABDOMEN AND PELVIS:   Lower chest: The visualized lung bases demonstrate no gross finding   Liver: No gross evidence of for hepatic masses given the limitation early arterial phase contrast CT   Biliary tree: No intrahepatic biliary dilatation, the common bile duct grossly unremarkable   Gallbladder: No gross abnormality   Pancreas: No gross abnormality   Spleen: No gross abnormality   Adrenal: The adrenal appears unremarkable   Kidneys: Symmetrical enhancement bilaterally is present. Presumably visualization of septated cyst in anterior aspect of the left kidney. Punctate nonobstructing stone is present in the midpole of the right kidney.   Peritoneum and retroperitoneum: No free fluid, free air and/or gross masses   Lymph nodes: Nonspecific small periaortic lymph nodes are present as well as marly hepatis, the appearance remain within normal limits.   Bowel: No abnormally dilated bowel segments are seen. No significant inflammatory changes are present. Extensive colonic diverticulosis are seen without secondary sign of acute diverticulitis.   Bladder:  Is empty and cannot be evaluated   Pelvis: The visualized prostate measures approximately 5.1 cm. No free fluid is seen in the pelvis. Scattered small nonspecific inguinal lymph nodes.   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 Please refer to detailed MRI dated 01/31/2024. The mass abutting the inferior gluteal artery and likely supplied by branches of the inferior gluteal artery. Small accessory branches of the profunda appears to be abutting the most inferior aspect of the mass.   Subcutaneous varicose veins are present in the lower extremities distally just above the feet. Bilateral varices within the feet are also present.       1. Overall, redemonstration of known right-sided gluteal mass appears to be predominantly supply of branches of the inferior gluteal artery. Small branches of the profound supplying the lower portion of the mass. 2. Peripheral vascular disease predominantly in the lower extremities, without gross evidence of significant narrowing or stenosis. Possible varicose veins distally in the bilateral lower extremities 3. Other nonspecific finding as detailed above.           Signed by: Elaine Maya 2/28/2024 10:36 AM Dictation workstation:   [id number]      Current Medications  Scheduled medications  acetaminophen, 650 mg, oral, q6h LEILA  aspirin, 81 mg, oral, Daily  atorvastatin, 40 mg, oral, Nightly  docusate sodium, 100 mg, oral, BID  enoxaparin, 40 mg, subcutaneous, q24h  gabapentin, 300 mg, oral, q8h LEILA  insulin glargine, 12 Units, subcutaneous, Nightly  insulin lispro, 0-10 Units, subcutaneous, TID AC  insulin lispro, 4 Units, subcutaneous, TID with meals  metFORMIN, 1,000 mg, oral, Daily with breakfast  metFORMIN, 1,500 mg, oral, Nightly  methocarbamol, 500 mg, oral, q6h LEILA  multivitamin with minerals, 1 tablet, oral, Daily      Continuous medications     PRN medications  PRN medications:  dextrose 10 % in water (D10W), dextrose, diphenhydrAMINE, glucagon, magnesium hydroxide, naloxone, ondansetron **OR** ondansetron, oxyCODONE, oxyCODONE     Assessment  Jason Hollins is a 63 y.o. male with a past medical history significant for DM II, HPL, HTN and gluteal myxoid chondrosarcoma s/p radiation. Patient taken to the OR on 3/5/2024 for wide resection of gluteal sarcoma and sciatic nerve neurolysis per Dr. Hawkins of Orthopedic Surgery followed by right gluteal reconstruction, pedicle ALT, vastus lateralus flap, pedicle gluteal and perisformis flap, pelvic closure and incisional wound vac placement per Dr. Smith of Plastic Surgery.     Plan/Recommendations  # S/p right gluteal reconstruction, pedicle ALT, vastus lateralus flap, pedicle gluteal and perisformis flap with incisional wound vac placement   - Serial flap assessments Q6hour per nursing with interval checks per plastics       ·  Assess flap appearance (color, warmth, turgor) over windowed area (no doppler assessment required)        ·  Nursing to notify plastic surgery team immediately if there are any acute changes          (Including pallor, temperature change, bleeding, etc.)  - Incisional wound vac removed at bedside (3/19), no issues or complications. Surgical incisions healing well, C/D/I   - Maintain left lateral decubitus positioning to avoid pressure to flap region/incisions  - Discontinue use of knee immobilizer  - Discontinue bedrest today (3/19)  - PT/OT re-consulted with following restrictions:       ·  NWB RLE       ·  Avoid prolonged hip flexion of the R leg. OK to flex hip to get out of bed and use bedside commode/toilet. May sit in a chair for 15-20 mins at a time with seat reclined and legs elevated        ·  OK for knee flexion and abduction/adduction of the R leg       ·  PT/OT recommend moderate intensity therapy post discharge  - Recommend DC purewick, now that patient able to lay supine, recommend urinal use  - Monitor VS/pulse  ox [id number]   - Monitor AM CBC/RFP/Mg PRN      # Acute post op pain  - Pain well controlled per patient, continue current regimen        ·  Acetaminophen 650mg Q6 hours scheduled       ·  PO Robaxin re-initiated 3/15       ·  Gabapentin 300mg Q8 scheduled       ·  Oxycodone 5mg [id number] PRN moderate  - Zofran 4mg PRN for opoid associated nausea  - Benadryl 25mg PRN for itching  - Monitor pain assessments [id number]      # Hx DM type II  - Diabetic diet (low carb 60 CHO), glucerna supplemental shakes and fairlife shakes   - Continue home metformin POD1 (1,000mg AM, 1,500mg PM)  - Hypoglycemic protocol in place  - Pt has not seen his PCP for diabetic mgmt in over 6 months; checks blood sugar occasionally at home, typically runs in the 180s   - Consult to inpatient endocrinology for inpatient diabetic management and possible adjustments to home diabetic regimen given persistently elevated glucose levels post operatively        ·  Glargine 12units at bedtime       ·  Lispro 4units with meals       ·  Lispro corrective scale #2 with meals   - Accuchecks TID and at bedtime      # Hx hyperlipidemia   - Continue Atorvastatin 40mg daily     # Hx HTN  - BP stable, does not take any home medications  - Monitor VS [id number]      # Vitamin D deficiency  - Patient takes Vitamin D2 50,000 units twice monthly (15th and 30th)  - Dose ordered for 3/15, if patient remains in house, will need dose reordered for 3/30     FEN/GI:   - Encouraged PO intake   - Monitor and replete lytes PRN  - Carb controlled diet   - Protein supplementation TID (glucerna or fairlife shakes)   - Bowel regimen: Colace 100mg BID and Milk of Mag PRN     Prophylaxis:   - DVT: SQ Lovenox 40mg daily, home ASA 81mg daily, SCDs to RLE        ·  Anticipate discharge with ASA 81mg BID as per orthopedic recommendations   - Encouraged use of IS (x10 every hour while awake)       Disposition: Continue care on RNF. Bedrest order discontinued on 3/19. PT/OT recommend moderate  intensity therapy post DC. Medically stable for discharge to SNF pending pre-cert. Will need follow up appointment with plastic surgery and orthopedic surgery closer to anticipated date of discharge.     Patient and plan discussed with Dr. Rosenbaum, APRN-CNP   Plastic and Reconstructive Surgery   [place]  Pager #[phone]  Team phones: [phone]

## 2024-03-20 NOTE — PROCEDURES
Airway  Date/Time: 3/20/2024 5:47 PM  Urgency: emergent    Difficult airway    Staffing  Performed: resident   Authorized by: Nathalie Grijalva MD    Performed by: Nathalie Grijalva MD  Patient location during procedure: patient's room    Consent for Airway (if performed for an anesthetic, see related documentation for consents)  Patient identity confirmed: arm band and hospital-assigned identification number  Consent: The procedure was performed in an emergent situation.      Indications and Patient Condition  Indications for airway management: cardio/pulmonary arrest and respiratory failure  Spontaneous Ventilation: absent  Sedation level: no sedation  Preoxygenated: yes  Patient position: sniffing  Mask difficulty assessment: 1 - vent by mask  No planned trial extubation    Final Airway Details  Final airway type: endotracheal airway      Successful airway: ETT  Cuffed: yes   Successful intubation technique: video laryngoscopy  Facilitating devices/methods: Bougie  Blade size: #4  ETT size (mm): 8.0  Cormack-Lehane Classification: grade IIa - partial view of glottis  Placement verified by: chest auscultation and capnometry   Measured from: lips  ETT to lips (cm): 24  Number of attempts at approach: 3 or more  Ventilation between attempts: BVM    Additional Comments   Emergent procedure. Suction available.  Patient was placed in the sniffing position.  A Macgrath blade was inserted into the oropharynx at which time the vocal cords were visualized. A 8-Yi endotracheal tube was inserted but unable to pass through the cords.  Patient was masked in between attempts.  Bougie was used for the second attempt but unable to pass through the cords.  Third attempt was successful with endotracheal tube visualized going through the vocal cords. The stylette was removed. Colorimetric change was visualized on the CO2 meter x5. Breath sounds were heard in both lung fields equally. The endotracheal tube was placed at 23cm at the  incisors.  The tube was secured in place with tie.

## 2024-03-20 NOTE — NURSING NOTE
Report called to Rhina JASSO at [facility]. Pt being transferred with RRT team at bedside after regaining ROSC and family at bedside. Will deliver belongings by charge nurse and unit manager.

## 2024-03-20 NOTE — CODE DOCUMENTATION
Use of MASSIVE PE PROTOCOL per CHRISTIANNE Moreno MD Surgical Intensivist during emergent situation  Review of protocol performed  Medication reconstitution and administration verified by two Rapid Response Team nurses: Katelyn Daley, RN and Sandor Beyer RN    1826: Alteplase 50 mg IVP administered over 2 minutes via central line   1829: Heparin 10,000 units IVP (omid)     Following use of emergent TPA medication kit, tackle box and all remaining contents inside box were hand delivered back to original pharmacist (GAYATHRI 2 @ 5859)--including (1) one unopened Alteplase 50mg vial. Patient information provided to pharmacist, including administration and order details.

## 2024-03-20 NOTE — H&P
History Of Present Illness    Per nursing report, patient was getting to the side of the bed today to get up and walk when he had sudden chest heaviness. Pre-cardiac arrest EKG with signs of anterior ischemia. Nursing was called by family because patient went unresponsive.  ACLS was immediately started. ROSC was achieved after 1 round of CPR, however patient quickly decompensated into PEA.  Airway was secured.  Multiple rounds of CPR were completed with intermittent ROSC prior to transport to [facility]ICU.  Patient arrived to [facility]ICU under SICU care with active CPR underway.  Primary concern for STEMI vs massive PE.  Bedside POCUS TTE with biventricular failure and dilated RV.  RV septal bowing. Decided to give TPA to treat massive PE.  After several minutes of CPR patient achieved  sustained ROSC.  ** Please see code documentation note **    Patient awoke to command 45 minutes after ROSC achieved.  He had purposeful movements and followed commands.  He continues to be maintained on high dose epinepherine and levophed.        Past Medical History  Past Medical History:   Diagnosis Date    Diabetes mellitus (CMS/[facility])     Hyperlipidemia     Morbid (severe) obesity due to excess calories (CMS/[facility]ly [facility]) 05/31/2016    Severe obesity (BMI 35.0-39.9) with comorbidity       Surgical History  Past Surgical History:   Procedure Laterality Date    CT GUIDED PERCUTANEOUS BIOPSY MUSCLE  11/13/2023    CT GUIDED PERCUTANEOUS BIOPSY MUSCLE 11/13/2023 [facility] CT    OTHER SURGICAL HISTORY  05/31/2016    History Of Prior Surgery        Social History  He reports that he has never smoked. He has never used smokeless tobacco. He reports that he does not drink alcohol and does not use drugs.    Family History  Family History   Problem Relation Name Age of Onset    Coronary artery disease Mother      Diabetes Mother      Hypertension Mother      Cancer Father      Diabetes Sister      Diabetes Brother      Diabetes Other Grandmother        "  Allergies  Patient has no known allergies.    Review of Systems   Unable to perform ROS: Unstable vital signs        Physical Exam  Constitutional:       Appearance: He is obese.   HENT:      Mouth/Throat:      Comments: intubated  Cardiovascular:      Comments: CPR   Neurological:      Comments: Unable to assess          Last Recorded Vitals  Blood pressure (!) 62/48, pulse (!) 118, temperature 36.9 °C (98.5 °F), temperature source Temporal, resp. rate (!) 34, height 1.778 m (5' 10\"), weight 101 kg (222 lb 14.2 oz), SpO2 92 %.       Assessment/Plan   Principal Problem:    Soft tissue sarcoma (CMS/HCC)  Active Problems:    Extraskeletal myxoid chondrosarcoma (CMS/HCC)      Assessment:  Jason Hollins is a 63 y.o. 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 Hawkins and Dr. Smith on 3/5/24.  Post operative course was uncomplicated and patient was on RNF until 3/20 for prolonged bed rest to avoid hip flexion to maintain flap integrity.  Patient was on prophylactic lovenox while on bedrest.       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 Hawkins and Dr. Smith on 3/5/24. Now intubated on no sedation, received 1mg versed during code. Awake and follows commands.  - sedation as needed to tolerate ETT   - CT head noncon to rule out any intracranial bleed after TPA   - close neuro monitoring, patient at high risk for cerebral edema post arrest   - PT/OT consult when appropriate    CV: History of HTN, HLD. Acute cardiopulmonary arrest 2/2 to possible massive PE vs massive STEMI, received multiple rounds of CPR and one defibrillation.  " Sustained ROSC achieved after TPA bolus. Now maintained on high dose levophed and epinephrine  - continuous EKG/abp monitoring  - Goal map range 65-90  - wean pressors as able   - formal TTE tmrw, and consult for cardiology if STEMI    PULM: Arrived to SICU intubated julio c-CPR. Concern for massive PE.   - Wean FiO2 as tolerated.    - additional pulm toilet prn.    - F/U CXR  - CT angio chest ab/pelvis ordered     GI:   - NPO     : No history of renal disease. Baseline creatinine 0.6.  - Marino placement needed   - Check renal function panel   - Replete electrolytes to goal K>4, Mg>2, Phos>2.5, ionized Ca>1.10.    HEME: Patient was on ppx lovenox for DVT prophylaxis, concern for massive PE patient received bolus of TPA during CPR.   - Check CBC and coags post op and daily  - ongoing monitoring for s/s bleeding    ENDO: History of DM2. Hyperglycemic post CPR   - SSI   -  Holding home lantus  - Consider insulin drip when stable and appropriate    ID: Afebrile.   - temp q4h, wbc daily  - ongoing monitoring for s/s infection    Lines:  - right radial arterial line  - left subclavian central line  - PIV  - Right humerus IO     Dispo: Admit to ICU. Patient seen and discussed with ICU attending Dr. Moreno.    SICU phone [phone]     Vern Mcneil DO

## 2024-03-20 NOTE — SIGNIFICANT EVENT
RAPID RESPONSE RN NOTE    03/20/24 1935   Onset Documentation   Rapid Response Initiated By RN;Physician   Location/Room [facility]   Pager Time [phone]   Arrival Time 1718   Event End Time 1731   Primary Reason for Call Change in mental status;Staff concern     1715: Rapid Response paged out for patient who had gone unresponsive.  1717: Upon arrival to room patient found lying in bed, oriented x3, skin cold and clammy, Primary team Dr. Smith at bedside, Nursing obtaining 12 lead ECG.    Per bedside RN the Nicole the patient had gotten up to the side of the bed when he became diaphoretic and unable to respond to staff. Patient assisted back into bed, supine position and then became responsive. Rapid Response then paged out.    Approximately 1728: At this point in time during the Rapid Response the patient again became unresponsive, eyes rolled back and patient began agonal breathing.   Femoral pulse checked and unable to palpate.   CODE BLUE was activated, high quality CPR was immediately started and ACLS protocol initiated. Please see Code Blue documentation and code leader note for further details.  Patient transferred to CTICU room 5 - CPR in progress upon arrival to unit.

## 2024-03-21 ENCOUNTER — ANESTHESIA EVENT (OUTPATIENT)
Dept: OPERATING ROOM | Facility: HOSPITAL | Age: 64
DRG: 003 | End: 2024-03-21
Payer: COMMERCIAL

## 2024-03-21 ENCOUNTER — APPOINTMENT (OUTPATIENT)
Dept: RADIOLOGY | Facility: HOSPITAL | Age: 64
DRG: 003 | End: 2024-03-21
Payer: COMMERCIAL

## 2024-03-21 ENCOUNTER — ANESTHESIA (OUTPATIENT)
Dept: OPERATING ROOM | Facility: HOSPITAL | Age: 64
DRG: 003 | End: 2024-03-21
Payer: COMMERCIAL

## 2024-03-21 ENCOUNTER — APPOINTMENT (OUTPATIENT)
Dept: CARDIOLOGY | Facility: HOSPITAL | Age: 64
DRG: 003 | End: 2024-03-21
Payer: COMMERCIAL

## 2024-03-21 LAB
ABO GROUP (TYPE) IN BLOOD: NORMAL
ABO GROUP (TYPE) IN BLOOD: NORMAL
ACT BLD: 294 SEC (ref 89–169)
ALBUMIN SERPL BCP-MCNC: 2.6 G/DL (ref 3.4–5)
ALBUMIN SERPL BCP-MCNC: 2.7 G/DL (ref 3.4–5)
ALBUMIN SERPL BCP-MCNC: 2.8 G/DL (ref 3.4–5)
ALBUMIN SERPL BCP-MCNC: 3 G/DL (ref 3.4–5)
ALBUMIN SERPL BCP-MCNC: 3.2 G/DL (ref 3.4–5)
ALBUMIN SERPL BCP-MCNC: 3.3 G/DL (ref 3.4–5)
ANION GAP BLDA CALCULATED.4IONS-SCNC: 20 MMO/L (ref 10–25)
ANION GAP BLDA CALCULATED.4IONS-SCNC: 21 MMO/L (ref 10–25)
ANION GAP BLDA CALCULATED.4IONS-SCNC: 22 MMO/L (ref 10–25)
ANION GAP BLDA CALCULATED.4IONS-SCNC: 24 MMO/L (ref 10–25)
ANION GAP BLDA CALCULATED.4IONS-SCNC: 24 MMO/L (ref 10–25)
ANION GAP BLDA CALCULATED.4IONS-SCNC: 25 MMO/L (ref 10–25)
ANION GAP BLDA CALCULATED.4IONS-SCNC: 26 MMO/L (ref 10–25)
ANION GAP BLDA CALCULATED.4IONS-SCNC: 27 MMO/L (ref 10–25)
ANION GAP BLDA CALCULATED.4IONS-SCNC: 28 MMO/L (ref 10–25)
ANION GAP BLDA CALCULATED.4IONS-SCNC: 29 MMO/L (ref 10–25)
ANION GAP BLDA CALCULATED.4IONS-SCNC: 32 MMO/L (ref 10–25)
ANION GAP SERPL CALC-SCNC: 17 MMOL/L (ref 10–20)
ANION GAP SERPL CALC-SCNC: 27 MMOL/L (ref 10–20)
ANION GAP SERPL CALC-SCNC: 29 MMOL/L (ref 10–20)
ANION GAP SERPL CALC-SCNC: 35 MMOL/L (ref 10–20)
ANTIBODY SCREEN: NORMAL
ANTIBODY SCREEN: NORMAL
APTT PPP: 37 SECONDS (ref 27–38)
APTT PPP: 61 SECONDS (ref 27–38)
APTT PPP: 68 SECONDS (ref 27–38)
APTT PPP: >200 SECONDS (ref 27–38)
ATRIAL RATE: 127 BPM
ATRIAL RATE: 135 BPM
BASE EXCESS BLDA CALC-SCNC: -10.3 MMOL/L (ref -2–3)
BASE EXCESS BLDA CALC-SCNC: -10.6 MMOL/L (ref -2–3)
BASE EXCESS BLDA CALC-SCNC: -10.9 MMOL/L (ref -2–3)
BASE EXCESS BLDA CALC-SCNC: -11.4 MMOL/L (ref -2–3)
BASE EXCESS BLDA CALC-SCNC: -12.1 MMOL/L (ref -2–3)
BASE EXCESS BLDA CALC-SCNC: -13.2 MMOL/L (ref -2–3)
BASE EXCESS BLDA CALC-SCNC: -13.9 MMOL/L (ref -2–3)
BASE EXCESS BLDA CALC-SCNC: -14.7 MMOL/L (ref -2–3)
BASE EXCESS BLDA CALC-SCNC: -15.1 MMOL/L (ref -2–3)
BASE EXCESS BLDA CALC-SCNC: -16.5 MMOL/L (ref -2–3)
BASE EXCESS BLDA CALC-SCNC: -18.3 MMOL/L (ref -2–3)
BASE EXCESS BLDA CALC-SCNC: -18.9 MMOL/L (ref -2–3)
BASE EXCESS BLDA CALC-SCNC: -19 MMOL/L (ref -2–3)
BASE EXCESS BLDA CALC-SCNC: -21.3 MMOL/L (ref -2–3)
BASE EXCESS BLDA CALC-SCNC: -4 MMOL/L (ref -2–3)
BASE EXCESS BLDA CALC-SCNC: -5.6 MMOL/L (ref -2–3)
BASE EXCESS BLDA CALC-SCNC: -7.2 MMOL/L (ref -2–3)
BASE EXCESS BLDA CALC-SCNC: -8.4 MMOL/L (ref -2–3)
BASE EXCESS BLDA CALC-SCNC: -9.8 MMOL/L (ref -2–3)
BLOOD EXPIRATION DATE: NORMAL
BODY SURFACE AREA: 2.23 M2
BODY TEMPERATURE: 37 DEGREES CELSIUS
BUN SERPL-MCNC: 28 MG/DL (ref 6–23)
BUN SERPL-MCNC: 29 MG/DL (ref 6–23)
BUN SERPL-MCNC: 30 MG/DL (ref 6–23)
BUN SERPL-MCNC: 30 MG/DL (ref 6–23)
CA-I BLD-SCNC: 0.98 MMOL/L (ref 1.1–1.33)
CA-I BLD-SCNC: 1.2 MMOL/L (ref 1.1–1.33)
CA-I BLD-SCNC: 1.21 MMOL/L (ref 1.1–1.33)
CA-I BLD-SCNC: 1.22 MMOL/L (ref 1.1–1.33)
CA-I BLD-SCNC: 1.25 MMOL/L (ref 1.1–1.33)
CA-I BLDA-SCNC: 0.95 MMOL/L (ref 1.1–1.33)
CA-I BLDA-SCNC: 0.96 MMOL/L (ref 1.1–1.33)
CA-I BLDA-SCNC: 0.99 MMOL/L (ref 1.1–1.33)
CA-I BLDA-SCNC: 1.02 MMOL/L (ref 1.1–1.33)
CA-I BLDA-SCNC: 1.1 MMOL/L (ref 1.1–1.33)
CA-I BLDA-SCNC: 1.15 MMOL/L (ref 1.1–1.33)
CA-I BLDA-SCNC: 1.19 MMOL/L (ref 1.1–1.33)
CA-I BLDA-SCNC: 1.19 MMOL/L (ref 1.1–1.33)
CA-I BLDA-SCNC: 1.2 MMOL/L (ref 1.1–1.33)
CA-I BLDA-SCNC: 1.22 MMOL/L (ref 1.1–1.33)
CA-I BLDA-SCNC: 1.23 MMOL/L (ref 1.1–1.33)
CA-I BLDA-SCNC: 1.24 MMOL/L (ref 1.1–1.33)
CA-I BLDA-SCNC: 1.27 MMOL/L (ref 1.1–1.33)
CA-I BLDA-SCNC: 1.28 MMOL/L (ref 1.1–1.33)
CA-I BLDA-SCNC: 1.3 MMOL/L (ref 1.1–1.33)
CA-I BLDA-SCNC: 1.31 MMOL/L (ref 1.1–1.33)
CALCIUM SERPL-MCNC: 10 MG/DL (ref 8.6–10.6)
CALCIUM SERPL-MCNC: 7.7 MG/DL (ref 8.6–10.6)
CALCIUM SERPL-MCNC: 8.2 MG/DL (ref 8.6–10.6)
CALCIUM SERPL-MCNC: 8.3 MG/DL (ref 8.6–10.6)
CALCIUM SERPL-MCNC: 9.6 MG/DL (ref 8.6–10.6)
CALCIUM SERPL-MCNC: 9.6 MG/DL (ref 8.6–10.6)
CARDIAC TROPONIN I PNL SERPL HS: 5928 NG/L (ref 0–53)
CARDIAC TROPONIN I PNL SERPL HS: ABNORMAL NG/L (ref 0–53)
CFT BLD TEG: >5 MIN (ref 0.8–2.1)
CFT BLD TEG: >5 MIN (ref 0.8–2.1)
CHLORIDE BLDA-SCNC: 100 MMOL/L (ref 98–107)
CHLORIDE BLDA-SCNC: 101 MMOL/L (ref 98–107)
CHLORIDE BLDA-SCNC: 101 MMOL/L (ref 98–107)
CHLORIDE BLDA-SCNC: 102 MMOL/L (ref 98–107)
CHLORIDE BLDA-SCNC: 103 MMOL/L (ref 98–107)
CHLORIDE BLDA-SCNC: 104 MMOL/L (ref 98–107)
CHLORIDE BLDA-SCNC: 105 MMOL/L (ref 98–107)
CHLORIDE BLDA-SCNC: 105 MMOL/L (ref 98–107)
CHLORIDE SERPL-SCNC: 100 MMOL/L (ref 98–107)
CHLORIDE SERPL-SCNC: 101 MMOL/L (ref 98–107)
CHLORIDE SERPL-SCNC: 102 MMOL/L (ref 98–107)
CHLORIDE SERPL-SCNC: 102 MMOL/L (ref 98–107)
CHLORIDE SERPL-SCNC: 103 MMOL/L (ref 98–107)
CHLORIDE SERPL-SCNC: 104 MMOL/L (ref 98–107)
CLOT ANGLE BLD TEG: 43 DEG (ref 63–78)
CLOT ANGLE BLD TEG: <39 DEG (ref 63–78)
CLOT INIT BLD TEG: 12.4 MIN (ref 4.6–9.1)
CLOT INIT BLD TEG: >17 MIN (ref 4.6–9.1)
CLOT INIT P HPASE BLD TEG: 11 MIN (ref 4.3–8.3)
CLOT INIT P HPASE BLD TEG: 11.1 MIN (ref 4.3–8.3)
CO2 SERPL-SCNC: 10 MMOL/L (ref 21–32)
CO2 SERPL-SCNC: 10 MMOL/L (ref 21–32)
CO2 SERPL-SCNC: 15 MMOL/L (ref 21–32)
CO2 SERPL-SCNC: 16 MMOL/L (ref 21–32)
CO2 SERPL-SCNC: 21 MMOL/L (ref 21–32)
CO2 SERPL-SCNC: 23 MMOL/L (ref 21–32)
COHGB MFR BLDA: 1.3 %
COHGB MFR BLDA: 1.6 %
COHGB MFR BLDA: 1.7 %
CREAT SERPL-MCNC: 1.77 MG/DL (ref 0.5–1.3)
CREAT SERPL-MCNC: 2.3 MG/DL (ref 0.5–1.3)
CREAT SERPL-MCNC: 2.55 MG/DL (ref 0.5–1.3)
CREAT SERPL-MCNC: 2.56 MG/DL (ref 0.5–1.3)
CREAT SERPL-MCNC: 2.64 MG/DL (ref 0.5–1.3)
CREAT SERPL-MCNC: 2.65 MG/DL (ref 0.5–1.3)
DISPENSE STATUS: NORMAL
DO-HGB MFR BLDA: 15.5 % (ref 0–5)
DO-HGB MFR BLDA: 5.5 % (ref 0–5)
DO-HGB MFR BLDA: 7 % (ref 0–5)
EGFRCR SERPLBLD CKD-EPI 2021: 26 ML/MIN/1.73M*2
EGFRCR SERPLBLD CKD-EPI 2021: 27 ML/MIN/1.73M*2
EGFRCR SERPLBLD CKD-EPI 2021: 28 ML/MIN/1.73M*2
EGFRCR SERPLBLD CKD-EPI 2021: 28 ML/MIN/1.73M*2
EGFRCR SERPLBLD CKD-EPI 2021: 31 ML/MIN/1.73M*2
EGFRCR SERPLBLD CKD-EPI 2021: 43 ML/MIN/1.73M*2
ERYTHROCYTE [DISTWIDTH] IN BLOOD BY AUTOMATED COUNT: 13.2 % (ref 11.5–14.5)
ERYTHROCYTE [DISTWIDTH] IN BLOOD BY AUTOMATED COUNT: 13.3 % (ref 11.5–14.5)
ERYTHROCYTE [DISTWIDTH] IN BLOOD BY AUTOMATED COUNT: 13.5 % (ref 11.5–14.5)
ERYTHROCYTE [DISTWIDTH] IN BLOOD BY AUTOMATED COUNT: 13.5 % (ref 11.5–14.5)
ERYTHROCYTE [DISTWIDTH] IN BLOOD BY AUTOMATED COUNT: 13.8 % (ref 11.5–14.5)
ERYTHROCYTE [DISTWIDTH] IN BLOOD BY AUTOMATED COUNT: 13.8 % (ref 11.5–14.5)
FIBRINOGEN BLD CALC-MCNC: 157 MG/DL (ref 278–581)
FIBRINOGEN BLD CALC-MCNC: 325 MG/DL (ref 278–581)
FIBRINOGEN PPP-MCNC: 136 MG/DL (ref 200–400)
FIBRINOGEN PPP-MCNC: 234 MG/DL (ref 200–400)
GLUCOSE BLD MANUAL STRIP-MCNC: 147 MG/DL (ref 74–99)
GLUCOSE BLD MANUAL STRIP-MCNC: 270 MG/DL (ref 74–99)
GLUCOSE BLD MANUAL STRIP-MCNC: 293 MG/DL (ref 74–99)
GLUCOSE BLD MANUAL STRIP-MCNC: 388 MG/DL (ref 74–99)
GLUCOSE BLDA-MCNC: 148 MG/DL (ref 74–99)
GLUCOSE BLDA-MCNC: 171 MG/DL (ref 74–99)
GLUCOSE BLDA-MCNC: 172 MG/DL (ref 74–99)
GLUCOSE BLDA-MCNC: 174 MG/DL (ref 74–99)
GLUCOSE BLDA-MCNC: 183 MG/DL (ref 74–99)
GLUCOSE BLDA-MCNC: 188 MG/DL (ref 74–99)
GLUCOSE BLDA-MCNC: 200 MG/DL (ref 74–99)
GLUCOSE BLDA-MCNC: 204 MG/DL (ref 74–99)
GLUCOSE BLDA-MCNC: 206 MG/DL (ref 74–99)
GLUCOSE BLDA-MCNC: 228 MG/DL (ref 74–99)
GLUCOSE BLDA-MCNC: 234 MG/DL (ref 74–99)
GLUCOSE BLDA-MCNC: 262 MG/DL (ref 74–99)
GLUCOSE BLDA-MCNC: 263 MG/DL (ref 74–99)
GLUCOSE BLDA-MCNC: 264 MG/DL (ref 74–99)
GLUCOSE BLDA-MCNC: 267 MG/DL (ref 74–99)
GLUCOSE BLDA-MCNC: 299 MG/DL (ref 74–99)
GLUCOSE BLDA-MCNC: 428 MG/DL (ref 74–99)
GLUCOSE BLDA-MCNC: 493 MG/DL (ref 74–99)
GLUCOSE BLDA-MCNC: 508 MG/DL (ref 74–99)
GLUCOSE SERPL-MCNC: 158 MG/DL (ref 74–99)
GLUCOSE SERPL-MCNC: 239 MG/DL (ref 74–99)
GLUCOSE SERPL-MCNC: 289 MG/DL (ref 74–99)
GLUCOSE SERPL-MCNC: 295 MG/DL (ref 74–99)
GLUCOSE SERPL-MCNC: 396 MG/DL (ref 74–99)
GLUCOSE SERPL-MCNC: 489 MG/DL (ref 74–99)
HCO3 BLDA-SCNC: 10.3 MMOL/L (ref 22–26)
HCO3 BLDA-SCNC: 11.4 MMOL/L (ref 22–26)
HCO3 BLDA-SCNC: 13.3 MMOL/L (ref 22–26)
HCO3 BLDA-SCNC: 13.6 MMOL/L (ref 22–26)
HCO3 BLDA-SCNC: 14.1 MMOL/L (ref 22–26)
HCO3 BLDA-SCNC: 14.8 MMOL/L (ref 22–26)
HCO3 BLDA-SCNC: 15.5 MMOL/L (ref 22–26)
HCO3 BLDA-SCNC: 15.9 MMOL/L (ref 22–26)
HCO3 BLDA-SCNC: 16 MMOL/L (ref 22–26)
HCO3 BLDA-SCNC: 16.9 MMOL/L (ref 22–26)
HCO3 BLDA-SCNC: 17.1 MMOL/L (ref 22–26)
HCO3 BLDA-SCNC: 17.2 MMOL/L (ref 22–26)
HCO3 BLDA-SCNC: 17.8 MMOL/L (ref 22–26)
HCO3 BLDA-SCNC: 17.9 MMOL/L (ref 22–26)
HCO3 BLDA-SCNC: 18.8 MMOL/L (ref 22–26)
HCO3 BLDA-SCNC: 19.7 MMOL/L (ref 22–26)
HCO3 BLDA-SCNC: 20.7 MMOL/L (ref 22–26)
HCO3 BLDA-SCNC: 22.6 MMOL/L (ref 22–26)
HCO3 BLDA-SCNC: 8.5 MMOL/L (ref 22–26)
HCT VFR BLD AUTO: 19.4 % (ref 41–52)
HCT VFR BLD AUTO: 19.8 % (ref 41–52)
HCT VFR BLD AUTO: 25.5 % (ref 41–52)
HCT VFR BLD AUTO: 28.1 % (ref 41–52)
HCT VFR BLD AUTO: 30.3 % (ref 41–52)
HCT VFR BLD AUTO: 31 % (ref 41–52)
HCT VFR BLD EST: 16 % (ref 41–52)
HCT VFR BLD EST: 20 % (ref 41–52)
HCT VFR BLD EST: 21 % (ref 41–52)
HCT VFR BLD EST: 23 % (ref 41–52)
HCT VFR BLD EST: 23 % (ref 41–52)
HCT VFR BLD EST: 26 % (ref 41–52)
HCT VFR BLD EST: 28 % (ref 41–52)
HCT VFR BLD EST: 29 % (ref 41–52)
HCT VFR BLD EST: 30 % (ref 41–52)
HCT VFR BLD EST: 30 % (ref 41–52)
HCT VFR BLD EST: 31 % (ref 41–52)
HCT VFR BLD EST: 32 % (ref 41–52)
HCT VFR BLD EST: 34 % (ref 41–52)
HGB BLD-MCNC: 10.1 G/DL (ref 13.5–17.5)
HGB BLD-MCNC: 10.3 G/DL (ref 13.5–17.5)
HGB BLD-MCNC: 6.1 G/DL (ref 13.5–17.5)
HGB BLD-MCNC: 6.7 G/DL (ref 13.5–17.5)
HGB BLD-MCNC: 7.7 G/DL (ref 13.5–17.5)
HGB BLD-MCNC: 9.7 G/DL (ref 13.5–17.5)
HGB BLDA-MCNC: 10 G/DL (ref 13.5–17.5)
HGB BLDA-MCNC: 10.2 G/DL (ref 13.5–17.5)
HGB BLDA-MCNC: 10.2 G/DL (ref 13.5–17.5)
HGB BLDA-MCNC: 10.4 G/DL (ref 13.5–17.5)
HGB BLDA-MCNC: 10.6 G/DL (ref 13.5–17.5)
HGB BLDA-MCNC: 11.4 G/DL (ref 13.5–17.5)
HGB BLDA-MCNC: 5.4 G/DL (ref 13.5–17.5)
HGB BLDA-MCNC: 6.5 G/DL (ref 13.5–17.5)
HGB BLDA-MCNC: 7 G/DL (ref 13.5–17.5)
HGB BLDA-MCNC: 7.7 G/DL (ref 13.5–17.5)
HGB BLDA-MCNC: 7.8 G/DL (ref 13.5–17.5)
HGB BLDA-MCNC: 8.5 G/DL (ref 13.5–17.5)
HGB BLDA-MCNC: 9.4 G/DL (ref 13.5–17.5)
HGB BLDA-MCNC: 9.7 G/DL (ref 13.5–17.5)
HGB BLDA-MCNC: 9.8 G/DL (ref 13.5–17.5)
HGB BLDA-MCNC: 9.8 G/DL (ref 13.5–17.5)
HGB BLDA-MCNC: 9.9 G/DL (ref 13.5–17.5)
INHALED O2 CONCENTRATION: 100 %
INHALED O2 CONCENTRATION: 70 %
INR PPP: 1.5 (ref 0.9–1.1)
INR PPP: 1.5 (ref 0.9–1.1)
INR PPP: 1.8 (ref 0.9–1.1)
INR PPP: 3 (ref 0.9–1.1)
LACTATE BLDA-SCNC: 10.1 MMOL/L (ref 0.4–2)
LACTATE BLDA-SCNC: 10.1 MMOL/L (ref 0.4–2)
LACTATE BLDA-SCNC: 10.5 MMOL/L (ref 0.4–2)
LACTATE BLDA-SCNC: 10.7 MMOL/L (ref 0.4–2)
LACTATE BLDA-SCNC: 10.9 MMOL/L (ref 0.4–2)
LACTATE BLDA-SCNC: 11.1 MMOL/L (ref 0.4–2)
LACTATE BLDA-SCNC: 11.1 MMOL/L (ref 0.4–2)
LACTATE BLDA-SCNC: 11.5 MMOL/L (ref 0.4–2)
LACTATE BLDA-SCNC: 12.8 MMOL/L (ref 0.4–2)
LACTATE BLDA-SCNC: 13.4 MMOL/L (ref 0.4–2)
LACTATE BLDA-SCNC: 14.1 MMOL/L (ref 0.4–2)
LACTATE BLDA-SCNC: 14.8 MMOL/L (ref 0.4–2)
LACTATE BLDA-SCNC: 15.2 MMOL/L (ref 0.4–2)
LACTATE BLDA-SCNC: 9.1 MMOL/L (ref 0.4–2)
LACTATE BLDA-SCNC: 9.7 MMOL/L (ref 0.4–2)
LACTATE BLDA-SCNC: 9.9 MMOL/L (ref 0.4–2)
LACTATE BLDA-SCNC: 9.9 MMOL/L (ref 0.4–2)
LACTATE BLDA-SCNC: >17 MMOL/L (ref 0.4–2)
LACTATE BLDA-SCNC: >17 MMOL/L (ref 0.4–2)
LEFT VENTRICLE INTERNAL DIMENSION DIASTOLE: 4.8 CM (ref 3.5–6)
MAGNESIUM SERPL-MCNC: 1.57 MG/DL (ref 1.6–2.4)
MAGNESIUM SERPL-MCNC: 1.88 MG/DL (ref 1.6–2.4)
MAGNESIUM SERPL-MCNC: 2.59 MG/DL (ref 1.6–2.4)
MAGNESIUM SERPL-MCNC: 2.65 MG/DL (ref 1.6–2.4)
MCF BLD TEG: 18 MM (ref 15–32)
MCF BLD TEG: 41 MM (ref 52–69)
MCF BLD TEG: 50 MM (ref 52–70)
MCF BLD TEG: 54 MM (ref 52–69)
MCF BLD TEG: 54 MM (ref 52–70)
MCF BLD TEG: 8 MM (ref 15–32)
MCH RBC QN AUTO: 27.6 PG (ref 26–34)
MCH RBC QN AUTO: 28.9 PG (ref 26–34)
MCH RBC QN AUTO: 29.4 PG (ref 26–34)
MCH RBC QN AUTO: 29.6 PG (ref 26–34)
MCH RBC QN AUTO: 29.7 PG (ref 26–34)
MCH RBC QN AUTO: 29.9 PG (ref 26–34)
MCHC RBC AUTO-ENTMCNC: 30.2 G/DL (ref 32–36)
MCHC RBC AUTO-ENTMCNC: 31.4 G/DL (ref 32–36)
MCHC RBC AUTO-ENTMCNC: 32.6 G/DL (ref 32–36)
MCHC RBC AUTO-ENTMCNC: 33.8 G/DL (ref 32–36)
MCHC RBC AUTO-ENTMCNC: 34 G/DL (ref 32–36)
MCHC RBC AUTO-ENTMCNC: 34.5 G/DL (ref 32–36)
MCV RBC AUTO: 84 FL (ref 80–100)
MCV RBC AUTO: 87 FL (ref 80–100)
MCV RBC AUTO: 88 FL (ref 80–100)
MCV RBC AUTO: 91 FL (ref 80–100)
MCV RBC AUTO: 91 FL (ref 80–100)
MCV RBC AUTO: 94 FL (ref 80–100)
METHGB MFR BLDA: 0.9 % (ref 0–1.5)
METHGB MFR BLDA: 1.1 % (ref 0–1.5)
METHGB MFR BLDA: 1.2 % (ref 0–1.5)
NRBC BLD-RTO: 0.2 /100 WBCS (ref 0–0)
NRBC BLD-RTO: 0.5 /100 WBCS (ref 0–0)
NRBC BLD-RTO: 1.1 /100 WBCS (ref 0–0)
NRBC BLD-RTO: 2.2 /100 WBCS (ref 0–0)
NRBC BLD-RTO: 2.2 /100 WBCS (ref 0–0)
NRBC BLD-RTO: 3.2 /100 WBCS (ref 0–0)
OXYHGB MFR BLDA: 81.7 % (ref 94–98)
OXYHGB MFR BLDA: 81.7 % (ref 94–98)
OXYHGB MFR BLDA: 89.1 % (ref 94–98)
OXYHGB MFR BLDA: 90.6 % (ref 94–98)
OXYHGB MFR BLDA: 90.6 % (ref 94–98)
OXYHGB MFR BLDA: 91.9 % (ref 94–98)
OXYHGB MFR BLDA: 91.9 % (ref 94–98)
OXYHGB MFR BLDA: 92.1 % (ref 94–98)
OXYHGB MFR BLDA: 92.4 % (ref 94–98)
OXYHGB MFR BLDA: 92.4 % (ref 94–98)
OXYHGB MFR BLDA: 92.6 % (ref 94–98)
OXYHGB MFR BLDA: 93 % (ref 94–98)
OXYHGB MFR BLDA: 93.3 % (ref 94–98)
OXYHGB MFR BLDA: 93.4 % (ref 94–98)
OXYHGB MFR BLDA: 94.1 % (ref 94–98)
OXYHGB MFR BLDA: 95.1 % (ref 94–98)
OXYHGB MFR BLDA: 95.2 % (ref 94–98)
OXYHGB MFR BLDA: 95.3 % (ref 94–98)
OXYHGB MFR BLDA: 95.4 % (ref 94–98)
OXYHGB MFR BLDA: 95.6 % (ref 94–98)
OXYHGB MFR BLDA: 97.2 % (ref 94–98)
OXYHGB MFR BLDA: 98 % (ref 94–98)
P AXIS: 87 DEGREES
P AXIS: 95 DEGREES
PCO2 BLDA: 38 MM HG (ref 38–42)
PCO2 BLDA: 39 MM HG (ref 38–42)
PCO2 BLDA: 39 MM HG (ref 38–42)
PCO2 BLDA: 42 MM HG (ref 38–42)
PCO2 BLDA: 43 MM HG (ref 38–42)
PCO2 BLDA: 44 MM HG (ref 38–42)
PCO2 BLDA: 45 MM HG (ref 38–42)
PCO2 BLDA: 45 MM HG (ref 38–42)
PCO2 BLDA: 46 MM HG (ref 38–42)
PCO2 BLDA: 47 MM HG (ref 38–42)
PCO2 BLDA: 49 MM HG (ref 38–42)
PCO2 BLDA: 49 MM HG (ref 38–42)
PCO2 BLDA: 50 MM HG (ref 38–42)
PCO2 BLDA: 52 MM HG (ref 38–42)
PCO2 BLDA: 56 MM HG (ref 38–42)
PCO2 BLDA: 65 MM HG (ref 38–42)
PH BLDA: 6.92 PH (ref 7.38–7.42)
PH BLDA: 6.96 PH (ref 7.38–7.42)
PH BLDA: 7.01 PH (ref 7.38–7.42)
PH BLDA: 7.02 PH (ref 7.38–7.42)
PH BLDA: 7.03 PH (ref 7.38–7.42)
PH BLDA: 7.08 PH (ref 7.38–7.42)
PH BLDA: 7.08 PH (ref 7.38–7.42)
PH BLDA: 7.1 PH (ref 7.38–7.42)
PH BLDA: 7.11 PH (ref 7.38–7.42)
PH BLDA: 7.12 PH (ref 7.38–7.42)
PH BLDA: 7.15 PH (ref 7.38–7.42)
PH BLDA: 7.16 PH (ref 7.38–7.42)
PH BLDA: 7.17 PH (ref 7.38–7.42)
PH BLDA: 7.22 PH (ref 7.38–7.42)
PH BLDA: 7.22 PH (ref 7.38–7.42)
PH BLDA: 7.23 PH (ref 7.38–7.42)
PH BLDA: 7.25 PH (ref 7.38–7.42)
PH BLDA: 7.29 PH (ref 7.38–7.42)
PH BLDA: 7.29 PH (ref 7.38–7.42)
PHOSPHATE SERPL-MCNC: 10.6 MG/DL (ref 2.5–4.9)
PHOSPHATE SERPL-MCNC: 4.2 MG/DL (ref 2.5–4.9)
PHOSPHATE SERPL-MCNC: 6.9 MG/DL (ref 2.5–4.9)
PHOSPHATE SERPL-MCNC: 8.7 MG/DL (ref 2.5–4.9)
PHOSPHATE SERPL-MCNC: 8.8 MG/DL (ref 2.5–4.9)
PHOSPHATE SERPL-MCNC: 9.5 MG/DL (ref 2.5–4.9)
PLATELET # BLD AUTO: 104 X10*3/UL (ref 150–450)
PLATELET # BLD AUTO: 147 X10*3/UL (ref 150–450)
PLATELET # BLD AUTO: 291 X10*3/UL (ref 150–450)
PLATELET # BLD AUTO: 374 X10*3/UL (ref 150–450)
PLATELET # BLD AUTO: 438 X10*3/UL (ref 150–450)
PLATELET # BLD AUTO: 64 X10*3/UL (ref 150–450)
PO2 BLDA: 117 MM HG (ref 85–95)
PO2 BLDA: 146 MM HG (ref 85–95)
PO2 BLDA: 147 MM HG (ref 85–95)
PO2 BLDA: 241 MM HG (ref 85–95)
PO2 BLDA: 48 MM HG (ref 85–95)
PO2 BLDA: 57 MM HG (ref 85–95)
PO2 BLDA: 60 MM HG (ref 85–95)
PO2 BLDA: 61 MM HG (ref 85–95)
PO2 BLDA: 62 MM HG (ref 85–95)
PO2 BLDA: 62 MM HG (ref 85–95)
PO2 BLDA: 64 MM HG (ref 85–95)
PO2 BLDA: 67 MM HG (ref 85–95)
PO2 BLDA: 71 MM HG (ref 85–95)
PO2 BLDA: 75 MM HG (ref 85–95)
PO2 BLDA: 77 MM HG (ref 85–95)
PO2 BLDA: 84 MM HG (ref 85–95)
PO2 BLDA: 86 MM HG (ref 85–95)
POTASSIUM BLDA-SCNC: 3.1 MMOL/L (ref 3.5–5.3)
POTASSIUM BLDA-SCNC: 3.4 MMOL/L (ref 3.5–5.3)
POTASSIUM BLDA-SCNC: 3.5 MMOL/L (ref 3.5–5.3)
POTASSIUM BLDA-SCNC: 3.7 MMOL/L (ref 3.5–5.3)
POTASSIUM BLDA-SCNC: 3.8 MMOL/L (ref 3.5–5.3)
POTASSIUM BLDA-SCNC: 3.9 MMOL/L (ref 3.5–5.3)
POTASSIUM BLDA-SCNC: 4 MMOL/L (ref 3.5–5.3)
POTASSIUM BLDA-SCNC: 4.1 MMOL/L (ref 3.5–5.3)
POTASSIUM BLDA-SCNC: 4.1 MMOL/L (ref 3.5–5.3)
POTASSIUM BLDA-SCNC: 4.2 MMOL/L (ref 3.5–5.3)
POTASSIUM BLDA-SCNC: 4.4 MMOL/L (ref 3.5–5.3)
POTASSIUM BLDA-SCNC: 4.4 MMOL/L (ref 3.5–5.3)
POTASSIUM BLDA-SCNC: 4.5 MMOL/L (ref 3.5–5.3)
POTASSIUM BLDA-SCNC: 4.5 MMOL/L (ref 3.5–5.3)
POTASSIUM BLDA-SCNC: 4.7 MMOL/L (ref 3.5–5.3)
POTASSIUM BLDA-SCNC: 5.4 MMOL/L (ref 3.5–5.3)
POTASSIUM SERPL-SCNC: 3.1 MMOL/L (ref 3.5–5.3)
POTASSIUM SERPL-SCNC: 3.9 MMOL/L (ref 3.5–5.3)
POTASSIUM SERPL-SCNC: 4 MMOL/L (ref 3.5–5.3)
POTASSIUM SERPL-SCNC: 4.5 MMOL/L (ref 3.5–5.3)
POTASSIUM SERPL-SCNC: 5 MMOL/L (ref 3.5–5.3)
POTASSIUM SERPL-SCNC: 5.1 MMOL/L (ref 3.5–5.3)
PR INTERVAL: 152 MS
PR INTERVAL: 168 MS
PRODUCT BLOOD TYPE: 5100
PRODUCT BLOOD TYPE: 6200
PRODUCT BLOOD TYPE: 9500
PRODUCT CODE: NORMAL
PROTHROMBIN TIME: 16.7 SECONDS (ref 9.8–12.8)
PROTHROMBIN TIME: 17.4 SECONDS (ref 9.8–12.8)
PROTHROMBIN TIME: 20.7 SECONDS (ref 9.8–12.8)
PROTHROMBIN TIME: 34.4 SECONDS (ref 9.8–12.8)
Q ONSET: 221 MS
Q ONSET: 223 MS
QRS COUNT: 21 BEATS
QRS COUNT: 22 BEATS
QRS DURATION: 140 MS
QRS DURATION: 140 MS
QT INTERVAL: 288 MS
QT INTERVAL: 314 MS
QTC CALCULATION(BAZETT): 432 MS
QTC CALCULATION(BAZETT): 456 MS
QTC FREDERICIA: 377 MS
QTC FREDERICIA: 403 MS
R AXIS: -46 DEGREES
R AXIS: -49 DEGREES
RBC # BLD AUTO: 2.06 X10*6/UL (ref 4.5–5.9)
RBC # BLD AUTO: 2.28 X10*6/UL (ref 4.5–5.9)
RBC # BLD AUTO: 2.79 X10*6/UL (ref 4.5–5.9)
RBC # BLD AUTO: 3.36 X10*6/UL (ref 4.5–5.9)
RBC # BLD AUTO: 3.4 X10*6/UL (ref 4.5–5.9)
RBC # BLD AUTO: 3.44 X10*6/UL (ref 4.5–5.9)
RH FACTOR (ANTIGEN D): NORMAL
RH FACTOR (ANTIGEN D): NORMAL
RIGHT VENTRICLE FREE WALL PEAK S': 12.5 CM/S
SAO2 % BLDA: 100 % (ref 94–100)
SAO2 % BLDA: 84 % (ref 94–100)
SAO2 % BLDA: 91 % (ref 94–100)
SAO2 % BLDA: 93 % (ref 94–100)
SAO2 % BLDA: 94 % (ref 94–100)
SAO2 % BLDA: 95 % (ref 94–100)
SAO2 % BLDA: 96 % (ref 94–100)
SAO2 % BLDA: 96 % (ref 94–100)
SAO2 % BLDA: 97 % (ref 94–100)
SAO2 % BLDA: 97 % (ref 94–100)
SAO2 % BLDA: 98 % (ref 94–100)
SAO2 % BLDA: 99 % (ref 94–100)
SODIUM BLDA-SCNC: 135 MMOL/L (ref 136–145)
SODIUM BLDA-SCNC: 137 MMOL/L (ref 136–145)
SODIUM BLDA-SCNC: 138 MMOL/L (ref 136–145)
SODIUM BLDA-SCNC: 139 MMOL/L (ref 136–145)
SODIUM BLDA-SCNC: 140 MMOL/L (ref 136–145)
SODIUM BLDA-SCNC: 141 MMOL/L (ref 136–145)
SODIUM SERPL-SCNC: 137 MMOL/L (ref 136–145)
SODIUM SERPL-SCNC: 138 MMOL/L (ref 136–145)
SODIUM SERPL-SCNC: 141 MMOL/L (ref 136–145)
SODIUM SERPL-SCNC: 142 MMOL/L (ref 136–145)
SODIUM SERPL-SCNC: 144 MMOL/L (ref 136–145)
SODIUM SERPL-SCNC: 145 MMOL/L (ref 136–145)
T AXIS: -8 DEGREES
T AXIS: 32 DEGREES
T OFFSET: 365 MS
T OFFSET: 380 MS
TEST COMMENT: ABNORMAL
TRICUSPID ANNULAR PLANE SYSTOLIC EXCURSION: 1.7 CM
UFH PPP CHRO-ACNC: 0.2 IU/ML
UFH PPP CHRO-ACNC: 0.9 IU/ML
UFH PPP CHRO-ACNC: 0.9 IU/ML
UNIT ABO: NORMAL
UNIT NUMBER: NORMAL
UNIT RH: NORMAL
UNIT VOLUME: 217
UNIT VOLUME: 263
UNIT VOLUME: 273
UNIT VOLUME: 278
UNIT VOLUME: 279
UNIT VOLUME: 281
UNIT VOLUME: 281
UNIT VOLUME: 282
UNIT VOLUME: 284
UNIT VOLUME: 293
UNIT VOLUME: 303
UNIT VOLUME: 306
UNIT VOLUME: 307
UNIT VOLUME: 320
UNIT VOLUME: 327
UNIT VOLUME: 329
UNIT VOLUME: 331
UNIT VOLUME: 332
UNIT VOLUME: 350
UNIT VOLUME: 354
UNIT VOLUME: 71
UNIT VOLUME: 84
UNIT VOLUME: 92
VENTRICULAR RATE: 127 BPM
VENTRICULAR RATE: 135 BPM
WBC # BLD AUTO: 12.3 X10*3/UL (ref 4.4–11.3)
WBC # BLD AUTO: 19.9 X10*3/UL (ref 4.4–11.3)
WBC # BLD AUTO: 20.3 X10*3/UL (ref 4.4–11.3)
WBC # BLD AUTO: 20.8 X10*3/UL (ref 4.4–11.3)
WBC # BLD AUTO: 5.3 X10*3/UL (ref 4.4–11.3)
WBC # BLD AUTO: 9.6 X10*3/UL (ref 4.4–11.3)
XM INTEP: NORMAL

## 2024-03-21 PROCEDURE — 71045 X-RAY EXAM CHEST 1 VIEW: CPT

## 2024-03-21 PROCEDURE — 71045 X-RAY EXAM CHEST 1 VIEW: CPT | Performed by: RADIOLOGY

## 2024-03-21 PROCEDURE — 2500000004 HC RX 250 GENERAL PHARMACY W/ HCPCS (ALT 636 FOR OP/ED): Performed by: STUDENT IN AN ORGANIZED HEALTH CARE EDUCATION/TRAINING PROGRAM

## 2024-03-21 PROCEDURE — 85347 COAGULATION TIME ACTIVATED: CPT

## 2024-03-21 PROCEDURE — 10140 I&D HMTMA SEROMA/FLUID COLLJ: CPT

## 2024-03-21 PROCEDURE — 85384 FIBRINOGEN ACTIVITY: CPT

## 2024-03-21 PROCEDURE — B31S1ZZ FLUOROSCOPY OF RIGHT PULMONARY ARTERY USING LOW OSMOLAR CONTRAST: ICD-10-PCS | Performed by: INTERNAL MEDICINE

## 2024-03-21 PROCEDURE — 75825 VEIN X-RAY TRUNK: CPT | Performed by: INTERNAL MEDICINE

## 2024-03-21 PROCEDURE — P9037 PLATE PHERES LEUKOREDU IRRAD: HCPCS

## 2024-03-21 PROCEDURE — 36005 INJECTION EXT VENOGRAPHY: CPT | Performed by: INTERNAL MEDICINE

## 2024-03-21 PROCEDURE — C1769 GUIDE WIRE: HCPCS | Performed by: INTERNAL MEDICINE

## 2024-03-21 PROCEDURE — 2500000005 HC RX 250 GENERAL PHARMACY W/O HCPCS: Performed by: STUDENT IN AN ORGANIZED HEALTH CARE EDUCATION/TRAINING PROGRAM

## 2024-03-21 PROCEDURE — 2500000004 HC RX 250 GENERAL PHARMACY W/ HCPCS (ALT 636 FOR OP/ED)

## 2024-03-21 PROCEDURE — 86901 BLOOD TYPING SEROLOGIC RH(D): CPT | Performed by: NURSE PRACTITIONER

## 2024-03-21 PROCEDURE — 85384 FIBRINOGEN ACTIVITY: CPT | Performed by: NURSE PRACTITIONER

## 2024-03-21 PROCEDURE — P9045 ALBUMIN (HUMAN), 5%, 250 ML: HCPCS | Mod: JZ | Performed by: STUDENT IN AN ORGANIZED HEALTH CARE EDUCATION/TRAINING PROGRAM

## 2024-03-21 PROCEDURE — 75716 ARTERY X-RAYS ARMS/LEGS: CPT | Performed by: INTERNAL MEDICINE

## 2024-03-21 PROCEDURE — 37799 UNLISTED PX VASCULAR SURGERY: CPT

## 2024-03-21 PROCEDURE — P9012 CRYOPRECIPITATE EACH UNIT: HCPCS

## 2024-03-21 PROCEDURE — 2720000007 HC OR 272 NO HCPCS

## 2024-03-21 PROCEDURE — P9040 RBC LEUKOREDUCED IRRADIATED: HCPCS

## 2024-03-21 PROCEDURE — 85347 COAGULATION TIME ACTIVATED: CPT | Performed by: INTERNAL MEDICINE

## 2024-03-21 PROCEDURE — 83735 ASSAY OF MAGNESIUM: CPT | Performed by: NURSE PRACTITIONER

## 2024-03-21 PROCEDURE — 37618 LIGATION MAJOR ARTERY XTR: CPT

## 2024-03-21 PROCEDURE — 2020000001 HC ICU ROOM DAILY

## 2024-03-21 PROCEDURE — 2500000005 HC RX 250 GENERAL PHARMACY W/O HCPCS

## 2024-03-21 PROCEDURE — 84132 ASSAY OF SERUM POTASSIUM: CPT

## 2024-03-21 PROCEDURE — 2500000005 HC RX 250 GENERAL PHARMACY W/O HCPCS: Performed by: NURSE PRACTITIONER

## 2024-03-21 PROCEDURE — 4A023N8 MEASUREMENT OF CARDIAC SAMPLING AND PRESSURE, BILATERAL, PERCUTANEOUS APPROACH: ICD-10-PCS | Performed by: INTERNAL MEDICINE

## 2024-03-21 PROCEDURE — 99233 SBSQ HOSP IP/OBS HIGH 50: CPT | Performed by: NURSE PRACTITIONER

## 2024-03-21 PROCEDURE — 2500000004 HC RX 250 GENERAL PHARMACY W/ HCPCS (ALT 636 FOR OP/ED): Performed by: NURSE PRACTITIONER

## 2024-03-21 PROCEDURE — 75743 ARTERY X-RAYS LUNGS: CPT | Performed by: INTERNAL MEDICINE

## 2024-03-21 PROCEDURE — 3600000017 HC OR TIME - EACH INCREMENTAL 1 MINUTE - PROCEDURE LEVEL SIX

## 2024-03-21 PROCEDURE — B31T1ZZ FLUOROSCOPY OF LEFT PULMONARY ARTERY USING LOW OSMOLAR CONTRAST: ICD-10-PCS | Performed by: INTERNAL MEDICINE

## 2024-03-21 PROCEDURE — 99291 CRITICAL CARE FIRST HOUR: CPT | Performed by: STUDENT IN AN ORGANIZED HEALTH CARE EDUCATION/TRAINING PROGRAM

## 2024-03-21 PROCEDURE — 93325 DOPPLER ECHO COLOR FLOW MAPG: CPT | Performed by: INTERNAL MEDICINE

## 2024-03-21 PROCEDURE — 13160 SEC CLSR SURG WND/DEHSN XTN: CPT

## 2024-03-21 PROCEDURE — 2500000001 HC RX 250 WO HCPCS SELF ADMINISTERED DRUGS (ALT 637 FOR MEDICARE OP)

## 2024-03-21 PROCEDURE — 93453 R&L HRT CATH W/VENTRICLGRPHY: CPT | Performed by: INTERNAL MEDICINE

## 2024-03-21 PROCEDURE — 85610 PROTHROMBIN TIME: CPT

## 2024-03-21 PROCEDURE — 36246 INS CATH ABD/L-EXT ART 2ND: CPT | Performed by: INTERNAL MEDICINE

## 2024-03-21 PROCEDURE — 86922 COMPATIBILITY TEST ANTIGLOB: CPT

## 2024-03-21 PROCEDURE — 93321 DOPPLER ECHO F-UP/LMTD STD: CPT | Performed by: INTERNAL MEDICINE

## 2024-03-21 PROCEDURE — 82435 ASSAY OF BLOOD CHLORIDE: CPT

## 2024-03-21 PROCEDURE — C1894 INTRO/SHEATH, NON-LASER: HCPCS | Performed by: INTERNAL MEDICINE

## 2024-03-21 PROCEDURE — 99140 ANES COMP EMERGENCY COND: CPT | Performed by: STUDENT IN AN ORGANIZED HEALTH CARE EDUCATION/TRAINING PROGRAM

## 2024-03-21 PROCEDURE — P9017 PLASMA 1 DONOR FRZ W/IN 8 HR: HCPCS

## 2024-03-21 PROCEDURE — 86901 BLOOD TYPING SEROLOGIC RH(D): CPT | Performed by: STUDENT IN AN ORGANIZED HEALTH CARE EDUCATION/TRAINING PROGRAM

## 2024-03-21 PROCEDURE — 82330 ASSAY OF CALCIUM: CPT | Performed by: NURSE PRACTITIONER

## 2024-03-21 PROCEDURE — 84295 ASSAY OF SERUM SODIUM: CPT | Performed by: NURSE PRACTITIONER

## 2024-03-21 PROCEDURE — 93308 TTE F-UP OR LMTD: CPT | Performed by: INTERNAL MEDICINE

## 2024-03-21 PROCEDURE — 2780000003 HC OR 278 NO HCPCS

## 2024-03-21 PROCEDURE — 36430 TRANSFUSION BLD/BLD COMPNT: CPT

## 2024-03-21 PROCEDURE — 94645 CONT INHLJ TX EACH ADDL HOUR: CPT

## 2024-03-21 PROCEDURE — 82947 ASSAY GLUCOSE BLOOD QUANT: CPT

## 2024-03-21 PROCEDURE — 2500000002 HC RX 250 W HCPCS SELF ADMINISTERED DRUGS (ALT 637 FOR MEDICARE OP, ALT 636 FOR OP/ED): Performed by: STUDENT IN AN ORGANIZED HEALTH CARE EDUCATION/TRAINING PROGRAM

## 2024-03-21 PROCEDURE — 2550000001 HC RX 255 CONTRASTS: Performed by: INTERNAL MEDICINE

## 2024-03-21 PROCEDURE — 84132 ASSAY OF SERUM POTASSIUM: CPT | Performed by: NURSE PRACTITIONER

## 2024-03-21 PROCEDURE — 93460 R&L HRT ART/VENTRICLE ANGIO: CPT | Performed by: INTERNAL MEDICINE

## 2024-03-21 PROCEDURE — 85610 PROTHROMBIN TIME: CPT | Performed by: NURSE PRACTITIONER

## 2024-03-21 PROCEDURE — 2500000005 HC RX 250 GENERAL PHARMACY W/O HCPCS: Performed by: INTERNAL MEDICINE

## 2024-03-21 PROCEDURE — 2500000004 HC RX 250 GENERAL PHARMACY W/ HCPCS (ALT 636 FOR OP/ED): Mod: JZ

## 2024-03-21 PROCEDURE — C1760 CLOSURE DEV, VASC: HCPCS

## 2024-03-21 PROCEDURE — 3700000001 HC GENERAL ANESTHESIA TIME - INITIAL BASE CHARGE

## 2024-03-21 PROCEDURE — P9016 RBC LEUKOCYTES REDUCED: HCPCS

## 2024-03-21 PROCEDURE — 85027 COMPLETE CBC AUTOMATED: CPT | Performed by: NURSE PRACTITIONER

## 2024-03-21 PROCEDURE — A13160 PR SECD CLOS SURG WND EXTEN/COMPLIC: Performed by: STUDENT IN AN ORGANIZED HEALTH CARE EDUCATION/TRAINING PROGRAM

## 2024-03-21 PROCEDURE — B2111ZZ FLUOROSCOPY OF MULTIPLE CORONARY ARTERIES USING LOW OSMOLAR CONTRAST: ICD-10-PCS | Performed by: INTERNAL MEDICINE

## 2024-03-21 PROCEDURE — 82375 ASSAY CARBOXYHB QUANT: CPT

## 2024-03-21 PROCEDURE — 94799 UNLISTED PULMONARY SVC/PX: CPT

## 2024-03-21 PROCEDURE — 93325 DOPPLER ECHO COLOR FLOW MAPG: CPT

## 2024-03-21 PROCEDURE — 83735 ASSAY OF MAGNESIUM: CPT

## 2024-03-21 PROCEDURE — 3700000002 HC GENERAL ANESTHESIA TIME - EACH INCREMENTAL 1 MINUTE

## 2024-03-21 PROCEDURE — 99221 1ST HOSP IP/OBS SF/LOW 40: CPT | Performed by: SURGERY

## 2024-03-21 PROCEDURE — 2720000007 HC OR 272 NO HCPCS: Performed by: INTERNAL MEDICINE

## 2024-03-21 PROCEDURE — 85520 HEPARIN ASSAY: CPT | Performed by: STUDENT IN AN ORGANIZED HEALTH CARE EDUCATION/TRAINING PROGRAM

## 2024-03-21 PROCEDURE — 3600000018 HC OR TIME - INITIAL BASE CHARGE - PROCEDURE LEVEL SIX

## 2024-03-21 PROCEDURE — 84484 ASSAY OF TROPONIN QUANT: CPT

## 2024-03-21 PROCEDURE — 0BJ08ZZ INSPECTION OF TRACHEOBRONCHIAL TREE, VIA NATURAL OR ARTIFICIAL OPENING ENDOSCOPIC: ICD-10-PCS | Performed by: OTOLARYNGOLOGY

## 2024-03-21 PROCEDURE — 85027 COMPLETE CBC AUTOMATED: CPT | Performed by: STUDENT IN AN ORGANIZED HEALTH CARE EDUCATION/TRAINING PROGRAM

## 2024-03-21 PROCEDURE — 82330 ASSAY OF CALCIUM: CPT

## 2024-03-21 PROCEDURE — 86920 COMPATIBILITY TEST SPIN: CPT

## 2024-03-21 PROCEDURE — 37799 UNLISTED PX VASCULAR SURGERY: CPT | Performed by: STUDENT IN AN ORGANIZED HEALTH CARE EDUCATION/TRAINING PROGRAM

## 2024-03-21 PROCEDURE — P9045 ALBUMIN (HUMAN), 5%, 250 ML: HCPCS | Mod: JZ

## 2024-03-21 PROCEDURE — 2500000004 HC RX 250 GENERAL PHARMACY W/ HCPCS (ALT 636 FOR OP/ED): Performed by: ANESTHESIOLOGY

## 2024-03-21 PROCEDURE — 85027 COMPLETE CBC AUTOMATED: CPT

## 2024-03-21 PROCEDURE — 37618 LIGATION MAJOR ARTERY XTR: CPT | Performed by: PHYSICIAN ASSISTANT

## 2024-03-21 PROCEDURE — P9073 PLATELETS PHERESIS PATH REDU: HCPCS

## 2024-03-21 PROCEDURE — 93573 NJX CATH SLCT P-ART ANGRP BI: CPT | Mod: 59 | Performed by: INTERNAL MEDICINE

## 2024-03-21 RX ORDER — FUROSEMIDE 10 MG/ML
INJECTION INTRAMUSCULAR; INTRAVENOUS AS NEEDED
Status: DISCONTINUED | OUTPATIENT
Start: 2024-03-21 | End: 2024-03-21

## 2024-03-21 RX ORDER — ACETAMINOPHEN 10 MG/ML
1000 INJECTION, SOLUTION INTRAVENOUS EVERY 6 HOURS SCHEDULED
Status: DISCONTINUED | OUTPATIENT
Start: 2024-03-21 | End: 2024-03-21

## 2024-03-21 RX ORDER — INDOMETHACIN 25 MG/1
CAPSULE ORAL
Status: COMPLETED
Start: 2024-03-21 | End: 2024-03-21

## 2024-03-21 RX ORDER — CALCIUM CHLORIDE INJECTION 100 MG/ML
INJECTION, SOLUTION INTRAVENOUS
Status: COMPLETED
Start: 2024-03-21 | End: 2024-03-21

## 2024-03-21 RX ORDER — CALCIUM CHLORIDE INJECTION 100 MG/ML
1 INJECTION, SOLUTION INTRAVENOUS ONCE
Status: COMPLETED | OUTPATIENT
Start: 2024-03-21 | End: 2024-03-22

## 2024-03-21 RX ORDER — ALBUMIN HUMAN 50 G/1000ML
25 SOLUTION INTRAVENOUS ONCE
Status: COMPLETED | OUTPATIENT
Start: 2024-03-21 | End: 2024-03-21

## 2024-03-21 RX ORDER — FENTANYL CITRATE-0.9 % NACL/PF 10 MCG/ML
25-200 PLASTIC BAG, INJECTION (ML) INTRAVENOUS CONTINUOUS
Status: DISCONTINUED | OUTPATIENT
Start: 2024-03-21 | End: 2024-03-23

## 2024-03-21 RX ORDER — CALCIUM CHLORIDE INJECTION 100 MG/ML
1 INJECTION, SOLUTION INTRAVENOUS ONCE
Status: COMPLETED | OUTPATIENT
Start: 2024-03-21 | End: 2024-03-21

## 2024-03-21 RX ORDER — INDOMETHACIN 25 MG/1
50 CAPSULE ORAL ONCE
Status: COMPLETED | OUTPATIENT
Start: 2024-03-21 | End: 2024-03-21

## 2024-03-21 RX ORDER — ROCURONIUM BROMIDE 10 MG/ML
INJECTION, SOLUTION INTRAVENOUS
Status: DISPENSED
Start: 2024-03-21 | End: 2024-03-21

## 2024-03-21 RX ORDER — SODIUM BICARBONATE 1 MEQ/ML
SYRINGE (ML) INTRAVENOUS AS NEEDED
Status: DISCONTINUED | OUTPATIENT
Start: 2024-03-21 | End: 2024-03-21

## 2024-03-21 RX ORDER — ALBUMIN HUMAN 50 G/1000ML
12.5 SOLUTION INTRAVENOUS ONCE
Status: COMPLETED | OUTPATIENT
Start: 2024-03-21 | End: 2024-03-21

## 2024-03-21 RX ORDER — VANCOMYCIN HYDROCHLORIDE 1 G/200ML
1000 INJECTION, SOLUTION INTRAVENOUS ONCE
Status: DISCONTINUED | OUTPATIENT
Start: 2024-03-21 | End: 2024-03-22

## 2024-03-21 RX ORDER — ROCURONIUM BROMIDE 10 MG/ML
100 INJECTION, SOLUTION INTRAVENOUS ONCE
Status: DISCONTINUED | OUTPATIENT
Start: 2024-03-21 | End: 2024-03-21 | Stop reason: SDUPTHER

## 2024-03-21 RX ORDER — ALBUMIN HUMAN 50 G/1000ML
SOLUTION INTRAVENOUS
Status: COMPLETED
Start: 2024-03-21 | End: 2024-03-21

## 2024-03-21 RX ORDER — LIDOCAINE HYDROCHLORIDE 20 MG/ML
INJECTION, SOLUTION INFILTRATION; PERINEURAL AS NEEDED
Status: DISCONTINUED | OUTPATIENT
Start: 2024-03-21 | End: 2024-03-21 | Stop reason: HOSPADM

## 2024-03-21 RX ORDER — PANTOPRAZOLE SODIUM 40 MG/10ML
40 INJECTION, POWDER, LYOPHILIZED, FOR SOLUTION INTRAVENOUS DAILY
Status: DISCONTINUED | OUTPATIENT
Start: 2024-03-21 | End: 2024-03-24

## 2024-03-21 RX ORDER — MIDAZOLAM HYDROCHLORIDE 1 MG/ML
INJECTION, SOLUTION INTRAMUSCULAR; INTRAVENOUS AS NEEDED
Status: DISCONTINUED | OUTPATIENT
Start: 2024-03-21 | End: 2024-03-21

## 2024-03-21 RX ORDER — ROCURONIUM BROMIDE 10 MG/ML
INJECTION, SOLUTION INTRAVENOUS
Status: COMPLETED
Start: 2024-03-21 | End: 2024-03-21

## 2024-03-21 RX ORDER — SODIUM CHLORIDE 9 MG/ML
INJECTION, SOLUTION INTRAVENOUS CONTINUOUS PRN
Status: DISCONTINUED | OUTPATIENT
Start: 2024-03-21 | End: 2024-03-21

## 2024-03-21 RX ORDER — EPINEPHRINE 1 MG/ML
INJECTION, SOLUTION, CONCENTRATE INTRAVENOUS
Status: DISPENSED
Start: 2024-03-21 | End: 2024-03-21

## 2024-03-21 RX ORDER — ROCURONIUM BROMIDE 10 MG/ML
INJECTION, SOLUTION INTRAVENOUS AS NEEDED
Status: DISCONTINUED | OUTPATIENT
Start: 2024-03-21 | End: 2024-03-21

## 2024-03-21 RX ORDER — HYDROGEN PEROXIDE 3 %
SOLUTION, NON-ORAL MISCELLANEOUS AS NEEDED
Status: DISCONTINUED | OUTPATIENT
Start: 2024-03-21 | End: 2024-03-21 | Stop reason: HOSPADM

## 2024-03-21 RX ORDER — ALBUMIN HUMAN 50 G/1000ML
SOLUTION INTRAVENOUS AS NEEDED
Status: DISCONTINUED | OUTPATIENT
Start: 2024-03-21 | End: 2024-03-21

## 2024-03-21 RX ORDER — ROCURONIUM BROMIDE 10 MG/ML
100 INJECTION, SOLUTION INTRAVENOUS ONCE
Status: DISCONTINUED | OUTPATIENT
Start: 2024-03-21 | End: 2024-03-21

## 2024-03-21 RX ORDER — CALCIUM CHLORIDE INJECTION 100 MG/ML
INJECTION, SOLUTION INTRAVENOUS AS NEEDED
Status: DISCONTINUED | OUTPATIENT
Start: 2024-03-21 | End: 2024-03-21

## 2024-03-21 RX ADMIN — SODIUM CHLORIDE, SODIUM LACTATE, POTASSIUM CHLORIDE, AND CALCIUM CHLORIDE: 600; 310; 30; 20 INJECTION, SOLUTION INTRAVENOUS at 12:43

## 2024-03-21 RX ADMIN — FUROSEMIDE 10 MG: 10 INJECTION, SOLUTION INTRAMUSCULAR; INTRAVENOUS at 16:46

## 2024-03-21 RX ADMIN — SODIUM CHLORIDE: 9 INJECTION, SOLUTION INTRAVENOUS at 15:19

## 2024-03-21 RX ADMIN — SODIUM BICARBONATE 50 MEQ: 84 INJECTION INTRAVENOUS at 15:49

## 2024-03-21 RX ADMIN — PIPERACILLIN SODIUM AND TAZOBACTAM SODIUM 3.38 G: 3; .375 INJECTION, SOLUTION INTRAVENOUS at 22:30

## 2024-03-21 RX ADMIN — EPINEPHRINE 0.7 MCG/KG/MIN: 1 INJECTION INTRAMUSCULAR; INTRAVENOUS; SUBCUTANEOUS at 01:56

## 2024-03-21 RX ADMIN — CALCIUM CHLORIDE 1 G: 100 INJECTION INTRAVENOUS; INTRAVENTRICULAR at 13:21

## 2024-03-21 RX ADMIN — POTASSIUM CHLORIDE 40 MEQ: 400 INJECTION, SOLUTION INTRAVENOUS at 02:34

## 2024-03-21 RX ADMIN — INSULIN HUMAN 19 UNITS/HR: 1 INJECTION, SOLUTION INTRAVENOUS at 09:14

## 2024-03-21 RX ADMIN — NOREPINEPHRINE BITARTRATE 0.2 MCG/KG/MIN: 8 INJECTION, SOLUTION INTRAVENOUS at 02:44

## 2024-03-21 RX ADMIN — PROPOFOL 25 MCG/KG/MIN: 10 INJECTION, EMULSION INTRAVENOUS at 07:23

## 2024-03-21 RX ADMIN — Medication 100 PERCENT: at 20:00

## 2024-03-21 RX ADMIN — ANGIOTENSIN II 80 NG/KG/MIN: 2.5 INJECTION INTRAVENOUS at 06:48

## 2024-03-21 RX ADMIN — ALBUMIN HUMAN 12.5 G: 50 SOLUTION INTRAVENOUS at 02:35

## 2024-03-21 RX ADMIN — PROPOFOL 25 MCG/KG/MIN: 10 INJECTION, EMULSION INTRAVENOUS at 19:20

## 2024-03-21 RX ADMIN — CALCIUM CHLORIDE 1 G: 100 INJECTION INTRAVENOUS; INTRAVENTRICULAR at 15:16

## 2024-03-21 RX ADMIN — ALBUMIN HUMAN 250 ML: 0.05 INJECTION, SOLUTION INTRAVENOUS at 15:12

## 2024-03-21 RX ADMIN — Medication 16 MCG: at 17:57

## 2024-03-21 RX ADMIN — FUROSEMIDE 10 MG: 10 INJECTION, SOLUTION INTRAMUSCULAR; INTRAVENOUS at 16:33

## 2024-03-21 RX ADMIN — PROTAMINE SULFATE 25 MG: 10 INJECTION, SOLUTION INTRAVENOUS at 09:21

## 2024-03-21 RX ADMIN — CALCIUM CHLORIDE 1 G: 100 INJECTION INTRAVENOUS; INTRAVENTRICULAR at 09:10

## 2024-03-21 RX ADMIN — PIPERACILLIN SODIUM AND TAZOBACTAM SODIUM 3.38 G: 3; .375 INJECTION, SOLUTION INTRAVENOUS at 13:00

## 2024-03-21 RX ADMIN — ALBUMIN HUMAN 250 ML: 0.05 INJECTION, SOLUTION INTRAVENOUS at 17:58

## 2024-03-21 RX ADMIN — ALBUMIN HUMAN 250 ML: 0.05 INJECTION, SOLUTION INTRAVENOUS at 15:23

## 2024-03-21 RX ADMIN — ALBUMIN HUMAN 250 ML: 0.05 INJECTION, SOLUTION INTRAVENOUS at 15:55

## 2024-03-21 RX ADMIN — SODIUM BICARBONATE 50 MEQ: 84 INJECTION, SOLUTION INTRAVENOUS at 07:32

## 2024-03-21 RX ADMIN — ACETAMINOPHEN 1000 MG: 10 INJECTION INTRAVENOUS at 05:14

## 2024-03-21 RX ADMIN — PERFLUTREN 3 ML OF DILUTION: 6.52 INJECTION, SUSPENSION INTRAVENOUS at 15:45

## 2024-03-21 RX ADMIN — CALCIUM CHLORIDE 1 G: 100 INJECTION INTRAVENOUS; INTRAVENTRICULAR at 10:39

## 2024-03-21 RX ADMIN — SODIUM BICARBONATE 50 MEQ: 84 INJECTION INTRAVENOUS at 15:32

## 2024-03-21 RX ADMIN — SODIUM BICARBONATE 100 MEQ: 84 INJECTION INTRAVENOUS at 16:28

## 2024-03-21 RX ADMIN — CALCIUM CHLORIDE 1 G: 100 INJECTION INTRAVENOUS; INTRAVENTRICULAR at 14:00

## 2024-03-21 RX ADMIN — SODIUM BICARBONATE 50 MEQ: 84 INJECTION, SOLUTION INTRAVENOUS at 07:33

## 2024-03-21 RX ADMIN — INSULIN HUMAN 10 UNITS: 100 INJECTION, SOLUTION PARENTERAL at 05:55

## 2024-03-21 RX ADMIN — PIPERACILLIN SODIUM AND TAZOBACTAM SODIUM 4.5 G: 4; .5 INJECTION, SOLUTION INTRAVENOUS at 04:42

## 2024-03-21 RX ADMIN — MIDAZOLAM 2 MG: 1 INJECTION INTRAMUSCULAR; INTRAVENOUS at 12:58

## 2024-03-21 RX ADMIN — EPINEPHRINE 0.8 MCG/KG/MIN: 1 INJECTION INTRAMUSCULAR; INTRAVENOUS; SUBCUTANEOUS at 03:59

## 2024-03-21 RX ADMIN — HYDROXOCOBALAMIN 5000 MG: 5 INJECTION, POWDER, LYOPHILIZED, FOR SOLUTION INTRAVENOUS at 01:47

## 2024-03-21 RX ADMIN — INDOMETHACIN 50 MEQ: 25 CAPSULE ORAL at 07:32

## 2024-03-21 RX ADMIN — CALCIUM CHLORIDE 1 G: 100 INJECTION INTRAVENOUS; INTRAVENTRICULAR at 09:17

## 2024-03-21 RX ADMIN — SODIUM BICARBONATE 50 MEQ: 84 INJECTION, SOLUTION INTRAVENOUS at 05:57

## 2024-03-21 RX ADMIN — PROPOFOL 25 MCG/KG/MIN: 10 INJECTION, EMULSION INTRAVENOUS at 11:48

## 2024-03-21 RX ADMIN — MAGNESIUM SULFATE HEPTAHYDRATE 4 G: 40 INJECTION, SOLUTION INTRAVENOUS at 03:06

## 2024-03-21 RX ADMIN — ALBUMIN HUMAN 12.5 G: 0.05 INJECTION, SOLUTION INTRAVENOUS at 02:35

## 2024-03-21 RX ADMIN — VASOPRESSIN 0.06 UNITS/MIN: 0.2 INJECTION INTRAVENOUS at 19:20

## 2024-03-21 RX ADMIN — CALCIUM CHLORIDE 1 G: 100 INJECTION INTRAVENOUS; INTRAVENTRICULAR at 08:34

## 2024-03-21 RX ADMIN — VASOPRESSIN 0.06 UNITS/MIN: 0.2 INJECTION INTRAVENOUS at 03:27

## 2024-03-21 RX ADMIN — Medication 0.05 MCG/KG/MIN: at 11:08

## 2024-03-21 RX ADMIN — SODIUM BICARBONATE 50 MEQ: 84 INJECTION, SOLUTION INTRAVENOUS at 05:45

## 2024-03-21 RX ADMIN — ROCURONIUM 50 MG: 50 INJECTION, SOLUTION INTRAVENOUS at 13:00

## 2024-03-21 RX ADMIN — NOREPINEPHRINE BITARTRATE 0.2 MCG/KG/MIN: 1 INJECTION, SOLUTION, CONCENTRATE INTRAVENOUS at 12:43

## 2024-03-21 RX ADMIN — MAGNESIUM SULFATE HEPTAHYDRATE 2 G: 40 INJECTION, SOLUTION INTRAVENOUS at 19:36

## 2024-03-21 RX ADMIN — VASOPRESSIN 0.06 UNITS/MIN: 0.2 INJECTION INTRAVENOUS at 08:47

## 2024-03-21 RX ADMIN — ROCURONIUM BROMIDE 50 MG: 10 INJECTION INTRAVENOUS at 09:28

## 2024-03-21 RX ADMIN — ROCURONIUM 50 MG: 50 INJECTION, SOLUTION INTRAVENOUS at 17:55

## 2024-03-21 RX ADMIN — ALBUMIN HUMAN 25 G: 50 SOLUTION INTRAVENOUS at 07:26

## 2024-03-21 RX ADMIN — MIDAZOLAM 2 MG: 1 INJECTION INTRAMUSCULAR; INTRAVENOUS at 16:51

## 2024-03-21 RX ADMIN — ALBUMIN HUMAN 12.5 G: 0.05 INJECTION, SOLUTION INTRAVENOUS at 01:48

## 2024-03-21 RX ADMIN — ALBUMIN HUMAN 25 G: 0.05 INJECTION, SOLUTION INTRAVENOUS at 07:26

## 2024-03-21 RX ADMIN — Medication 16 MCG: at 18:01

## 2024-03-21 RX ADMIN — Medication 50 MCG/HR: at 09:52

## 2024-03-21 ASSESSMENT — PAIN - FUNCTIONAL ASSESSMENT
PAIN_FUNCTIONAL_ASSESSMENT: CPOT (CRITICAL CARE PAIN OBSERVATION TOOL)

## 2024-03-21 NOTE — PROGRESS NOTES
Occupational Therapy                 Therapy Communication Note    Patient Name: Jason Hollins  MRN: [id number]  Today's Date: 3/21/2024     Discipline: Occupational Therapy    Missed Visit Reason: Missed Visit Reason:  (Pt. is not medically appropriate for OT services at this time.)    Missed Time: Jeramie Whitehead, OT  03/21/2024

## 2024-03-21 NOTE — SIGNIFICANT EVENT
6:50 am, I was notified that the patient is actively bleeding into his flap area.  7:05 am, I was present at the bed side, I evaluated the patient, I noticed swelling of the right gluteal area above the flap and proximal to it, and the tissue felt hard. No bruises were appreciated.   Notable events: The patient has received pulmonary angiogram, performed on the right femoral vessels on 3/21/23 at 1:03 am . Notable changes in Labs: CBC showed rapid drop in his HG from 10.3 at 20:32 on 3/20 to 7.7 at 1:36 on 3/21 am.   Clinical findings and CBC readings all indicate of bleeding that happen early morning, and now the space is occupied with hematoma.   I discussed plan of care with ICU attending physician at bed side, and reviewed treatment options and their impact on his condition, taken into account his hemodynamic status. And after thorough discussion, we decided to plan for emergent exploration as soon as he is safe to transport to OR.  I discussed the plan with the wife, indications and contraindications, possible risks including but not limited to deterioration of his hemodynamic condition, massive blood transfusion, flap necrosis, infection, cardiac arrest and mortality.   She expressed good understanding and agreed to proceed.

## 2024-03-21 NOTE — POST-PROCEDURE NOTE
Physician Transition of Care Summary  Invasive Cardiovascular Lab    Procedure Date: 3/21/2024  Attending:    Rayna Wolfe - Primary  Resident/Fellow/Other Assistant: Surgeon(s) and Role:     * Hattie Rae MD MPH - Fellow     * Alessandro Benton MD - Fellow    Indications:   Pre-op Diagnosis     * Cardiopulmonary arrest (CMS/HCC) [I46.9]    Post-procedure diagnosis:   Post-op Diagnosis     * Cardiopulmonary arrest (CMS/HCC) [I46.9]    Procedure(s):     * Pulmonary Angiogram      Procedure Findings:   Normal coronary artery  Pulmonary angiogram revealed no evidence of pulmonary emboli    Description of the Procedure:   Right 7Fr CFV -- left in   Right CFA 4Fr -- left in     Complications:   None       Estimated Blood Loss:   10 mL    Anesthesia: * No anesthesia type entered * Anesthesia Staff: No anesthesia staff entered.    Any Specimen(s) Removed:   No specimens collected during this procedure.    Disposition:   CTICU       Electronically signed by: Alessandro Benton MD, 3/21/2024 1:03 AM

## 2024-03-21 NOTE — CARE PLAN
Pt transferred to CTICU yesterday 2/2 AMS resulting in code blue. At time of this note pt had been diagnosed with cardiopulmonary arrest.     He is now intubated, sedated, and NPO. Pt is on epi/norepi/vasopressin which are all contributing to hyperglycemia.     Continuation of insulin gtt protocol is advised until pressor medications are stopped/reduced.     Please contact endocrine when pt is appropriate to transition off of insulin gtt. We will be happy to return to consult at that time.     Julia Moya PA-C   Endocrinology  Inpatient Diabetes Management Team

## 2024-03-21 NOTE — PROGRESS NOTES
"Jason Hollins is a 63 y.o. male on day 16 of admission presenting with Soft tissue sarcoma (CMS/HCC).    Subjective   ECHO done this AM and given apparent return of RV and LV function (while on high pressor support) and well as swelling of the right gluteal area above/proximal to flap and rapid decline in CBC plans for ECMO terminated.  Patient given multiple blood product transfusions with MTP and able to wean some pressor support. Returning to OR as hemorrhage concern is from the flap area.        Objective     Physical Exam  HENT:      Head: Normocephalic.      Mouth/Throat:      Mouth: Mucous membranes are moist.   Eyes:      Pupils: Pupils are equal, round, and reactive to light.   Cardiovascular:      Rate and Rhythm: Tachycardia present.   Pulmonary:      Breath sounds: Rhonchi present.   Abdominal:      Palpations: Abdomen is soft.   Skin:     General: Skin is warm and dry.      Comments: Right flap site is firm, large, swollen, no bruising noted         Last Recorded Vitals  Blood pressure 123/54, pulse (!) 125, temperature 37.5 °C (99.5 °F), resp. rate 16, height 1.778 m (5' 10\"), weight 101 kg (222 lb 14.2 oz), SpO2 97 %.  Intake/Output last 3 Shifts:  I/O last 3 completed shifts:  In: 3662 (36.2 mL/kg) [P.O.:200; I.V.:2946.7 (29.1 mL/kg); Blood:250; IV Piggyback:265.3]  Out: 840 (8.3 mL/kg) [Urine:830 (0.2 mL/kg/hr); Blood:10]  Weight: 101.1 kg     Relevant Results  Results for orders placed or performed during the hospital encounter of 03/05/24 (from the past 24 hour(s))   POCT GLUCOSE   Result Value Ref Range    POCT Glucose 144 (H) 74 - 99 mg/dL   Electrocardiogram, 12-lead PRN ACS symptoms   Result Value Ref Range    Ventricular Rate 135 BPM    Atrial Rate 135 BPM    MS Interval 152 ms    QRS Duration 140 ms    QT Interval 288 ms    QTC Calculation(Bazett) 432 ms    P Axis 87 degrees    R Axis -49 degrees    T Axis 32 degrees    QRS Count 22 beats    Q Onset 221 ms    T Offset 365 ms    QTC Fredericia " 377 ms   POCT GLUCOSE   Result Value Ref Range    POCT Glucose 130 (H) 74 - 99 mg/dL   POCT GLUCOSE   Result Value Ref Range    POCT Glucose 178 (H) 74 - 99 mg/dL   Blood Gas Venous Full Panel Unsolicited   Result Value Ref Range    POCT pH, Venous 7.16 (LL) 7.33 - 7.43 pH    POCT pCO2, Venous 115 (HH) 41 - 51 mm Hg    POCT pO2, Venous 33 (L) 35 - 45 mm Hg    POCT SO2, Venous 42 (L) 45 - 75 %    POCT Oxy Hemoglobin, Venous 41.2 (L) 45.0 - 75.0 %    POCT Hematocrit Calculated, Venous 28.0 (L) 41.0 - 52.0 %    POCT Sodium, Venous 162 (HH) 136 - 145 mmol/L    POCT Potassium, Venous 4.1 3.5 - 5.3 mmol/L    POCT Chloride, Venous 113 (H) 98 - 107 mmol/L    POCT Ionized Calicum, Venous 2.90 (HH) 1.10 - 1.33 mmol/L    POCT Glucose, Venous 428 (H) 74 - 99 mg/dL    POCT Lactate, Venous 13.9 (HH) 0.4 - 2.0 mmol/L    POCT Base Excess, Venous 9.7 (H) -2.0 - 3.0 mmol/L    POCT HCO3 Calculated, Venous 41.0 (H) 22.0 - 26.0 mmol/L    POCT Hemoglobin, Venous 9.3 (L) 13.5 - 17.5 g/dL    POCT Anion Gap, Venous 12.0 10.0 - 25.0 mmol/L    Patient Temperature 37.0 degrees Celsius   Blood Gas Arterial Full Panel Unsolicited   Result Value Ref Range    POCT pH, Arterial 6.86 (LL) 7.38 - 7.42 pH    POCT pCO2, Arterial 66 (H) 38 - 42 mm Hg    POCT pO2, Arterial 64 (L) 85 - 95 mm Hg    POCT SO2, Arterial 82 (L) 94 - 100 %    POCT Oxy Hemoglobin, Arterial 79.9 (L) 94.0 - 98.0 %    POCT Hematocrit Calculated, Arterial 26.0 (L) 41.0 - 52.0 %    POCT Sodium, Arterial 143 136 - 145 mmol/L    POCT Potassium, Arterial 4.6 3.5 - 5.3 mmol/L    POCT Chloride, Arterial 110 (H) 98 - 107 mmol/L    POCT Ionized Calcium, Arterial 1.48 (H) 1.10 - 1.33 mmol/L    POCT Glucose, Arterial 390 (H) 74 - 99 mg/dL    POCT Lactate, Arterial 15.4 (HH) 0.4 - 2.0 mmol/L    POCT Base Excess, Arterial -20.9 (L) -2.0 - 3.0 mmol/L    POCT HCO3 Calculated, Arterial 11.8 (L) 22.0 - 26.0 mmol/L    POCT Hemoglobin, Arterial 8.6 (L) 13.5 - 17.5 g/dL    POCT Anion Gap, Arterial 26  (H) 10 - 25 mmo/L    Patient Temperature 37.0 degrees Celsius   Blood Gas Arterial Full Panel Unsolicited   Result Value Ref Range    POCT pH, Arterial 6.92 (LL) 7.38 - 7.42 pH    POCT pCO2, Arterial 66 (H) 38 - 42 mm Hg    POCT pO2, Arterial 85 85 - 95 mm Hg    POCT SO2, Arterial 93 (L) 94 - 100 %    POCT Oxy Hemoglobin, Arterial 91.6 (L) 94.0 - 98.0 %    POCT Hematocrit Calculated, Arterial 26.0 (L) 41.0 - 52.0 %    POCT Sodium, Arterial 145 136 - 145 mmol/L    POCT Potassium, Arterial 5.4 (H) 3.5 - 5.3 mmol/L    POCT Chloride, Arterial 108 (H) 98 - 107 mmol/L    POCT Ionized Calcium, Arterial 1.39 (H) 1.10 - 1.33 mmol/L    POCT Glucose, Arterial 367 (H) 74 - 99 mg/dL    POCT Lactate, Arterial 15.9 (HH) 0.4 - 2.0 mmol/L    POCT Base Excess, Arterial -18.4 (L) -2.0 - 3.0 mmol/L    POCT HCO3 Calculated, Arterial 13.5 (L) 22.0 - 26.0 mmol/L    POCT Hemoglobin, Arterial 8.6 (L) 13.5 - 17.5 g/dL    POCT Anion Gap, Arterial 29 (H) 10 - 25 mmo/L    Patient Temperature 37.0 degrees Celsius   Electrocardiogram, 12-lead PRN ACS symptoms   Result Value Ref Range    Ventricular Rate 127 BPM    Atrial Rate 127 BPM    VA Interval 168 ms    QRS Duration 140 ms    QT Interval 314 ms    QTC Calculation(Bazett) 456 ms    P Axis 95 degrees    R Axis -46 degrees    T Axis -8 degrees    QRS Count 21 beats    Q Onset 223 ms    T Offset 380 ms    QTC Fredericia 403 ms   Magnesium   Result Value Ref Range    Magnesium 2.14 1.60 - 2.40 mg/dL   Calcium, Ionized   Result Value Ref Range    POCT Calcium, Ionized 1.25 1.1 - 1.33 mmol/L   Renal Function Panel   Result Value Ref Range    Glucose 339 (H) 74 - 99 mg/dL    Sodium 145 136 - 145 mmol/L    Potassium 3.7 3.5 - 5.3 mmol/L    Chloride 107 98 - 107 mmol/L    Bicarbonate 15 (L) 21 - 32 mmol/L    Anion Gap 27 (H) 10 - 20 mmol/L    Urea Nitrogen 22 6 - 23 mg/dL    Creatinine 1.22 0.50 - 1.30 mg/dL    eGFR 67 >60 mL/min/1.73m*2    Calcium 9.0 8.6 - 10.6 mg/dL    Phosphorus 9.3 (H) 2.5 - 4.9  mg/dL    Albumin 2.8 (L) 3.4 - 5.0 g/dL   CBC   Result Value Ref Range    WBC 18.9 (H) 4.4 - 11.3 x10*3/uL    nRBC 0.4 (H) 0.0 - 0.0 /100 WBCs    RBC 3.76 (L) 4.50 - 5.90 x10*6/uL    Hemoglobin 10.3 (L) 13.5 - 17.5 g/dL    Hematocrit 33.5 (L) 41.0 - 52.0 %    MCV 89 80 - 100 fL    MCH 27.4 26.0 - 34.0 pg    MCHC 30.7 (L) 32.0 - 36.0 g/dL    RDW 13.5 11.5 - 14.5 %    Platelets 536 (H) 150 - 450 x10*3/uL   Blood Gas Arterial Full Panel   Result Value Ref Range    POCT pH, Arterial 7.03 (LL) 7.38 - 7.42 pH    POCT pCO2, Arterial 48 (H) 38 - 42 mm Hg    POCT pO2, Arterial 101 (H) 85 - 95 mm Hg    POCT SO2, Arterial 97 94 - 100 %    POCT Oxy Hemoglobin, Arterial 95.0 94.0 - 98.0 %    POCT Hematocrit Calculated, Arterial 32.0 (L) 41.0 - 52.0 %    POCT Sodium, Arterial 141 136 - 145 mmol/L    POCT Potassium, Arterial 3.9 3.5 - 5.3 mmol/L    POCT Chloride, Arterial 106 98 - 107 mmol/L    POCT Ionized Calcium, Arterial 1.26 1.10 - 1.33 mmol/L    POCT Glucose, Arterial 343 (H) 74 - 99 mg/dL    POCT Lactate, Arterial 14.3 (HH) 0.4 - 2.0 mmol/L    POCT Base Excess, Arterial -17.5 (L) -2.0 - 3.0 mmol/L    POCT HCO3 Calculated, Arterial 12.7 (L) 22.0 - 26.0 mmol/L    POCT Hemoglobin, Arterial 10.6 (L) 13.5 - 17.5 g/dL    POCT Anion Gap, Arterial 26 (H) 10 - 25 mmo/L    Patient Temperature 37.0 degrees Celsius    FiO2 100 %   Hepatic function panel   Result Value Ref Range    Albumin 2.8 (L) 3.4 - 5.0 g/dL    Bilirubin, Total 0.7 0.0 - 1.2 mg/dL    Bilirubin, Direct 0.3 0.0 - 0.3 mg/dL    Alkaline Phosphatase 211 (H) 33 - 136 U/L     (H) 10 - 52 U/L     (H) 9 - 39 U/L    Total Protein 5.0 (L) 6.4 - 8.2 g/dL   B-type natriuretic peptide   Result Value Ref Range    BNP 2 0 - 99 pg/mL   Troponin I, High Sensitivity   Result Value Ref Range    Troponin I, High Sensitivity 5,928 (HH) 0 - 53 ng/L   Blood Gas Arterial Full Panel   Result Value Ref Range    POCT pH, Arterial 7.14 (LL) 7.38 - 7.42 pH    POCT pCO2, Arterial 40  38 - 42 mm Hg    POCT pO2, Arterial 92 85 - 95 mm Hg    POCT SO2, Arterial 98 94 - 100 %    POCT Oxy Hemoglobin, Arterial 95.2 94.0 - 98.0 %    POCT Hematocrit Calculated, Arterial 29.0 (L) 41.0 - 52.0 %    POCT Sodium, Arterial 139 136 - 145 mmol/L    POCT Potassium, Arterial 3.4 (L) 3.5 - 5.3 mmol/L    POCT Chloride, Arterial 104 98 - 107 mmol/L    POCT Ionized Calcium, Arterial 1.25 1.10 - 1.33 mmol/L    POCT Glucose, Arterial 439 (H) 74 - 99 mg/dL    POCT Lactate, Arterial 12.6 (HH) 0.4 - 2.0 mmol/L    POCT Base Excess, Arterial -14.5 (L) -2.0 - 3.0 mmol/L    POCT HCO3 Calculated, Arterial 13.6 (L) 22.0 - 26.0 mmol/L    POCT Hemoglobin, Arterial 9.7 (L) 13.5 - 17.5 g/dL    POCT Anion Gap, Arterial 25 10 - 25 mmo/L    Patient Temperature 37.0 degrees Celsius    FiO2 100 %   ACTIVATED CLOTTING TIME LOW   Result Value Ref Range    POCT Activated Clotting Time Low Range 294 (H) 89 - 169 sec   Magnesium   Result Value Ref Range    Magnesium 1.57 (L) 1.60 - 2.40 mg/dL   Calcium, Ionized   Result Value Ref Range    POCT Calcium, Ionized 1.20 1.1 - 1.33 mmol/L   Renal Function Panel   Result Value Ref Range    Glucose 489 (HH) 74 - 99 mg/dL    Sodium 137 136 - 145 mmol/L    Potassium 3.1 (L) 3.5 - 5.3 mmol/L    Chloride 102 98 - 107 mmol/L    Bicarbonate 21 21 - 32 mmol/L    Anion Gap 17 10 - 20 mmol/L    Urea Nitrogen 29 (H) 6 - 23 mg/dL    Creatinine 1.77 (H) 0.50 - 1.30 mg/dL    eGFR 43 (L) >60 mL/min/1.73m*2    Calcium 8.3 (L) 8.6 - 10.6 mg/dL    Phosphorus 4.2 2.5 - 4.9 mg/dL    Albumin 2.6 (L) 3.4 - 5.0 g/dL   CBC   Result Value Ref Range    WBC 20.8 (H) 4.4 - 11.3 x10*3/uL    nRBC 0.2 (H) 0.0 - 0.0 /100 WBCs    RBC 2.79 (L) 4.50 - 5.90 x10*6/uL    Hemoglobin 7.7 (L) 13.5 - 17.5 g/dL    Hematocrit 25.5 (L) 41.0 - 52.0 %    MCV 91 80 - 100 fL    MCH 27.6 26.0 - 34.0 pg    MCHC 30.2 (L) 32.0 - 36.0 g/dL    RDW 13.3 11.5 - 14.5 %    Platelets 438 150 - 450 x10*3/uL   Blood Gas Arterial Full Panel   Result Value Ref  Range    POCT pH, Arterial 7.22 (LL) 7.38 - 7.42 pH    POCT pCO2, Arterial 42 38 - 42 mm Hg    POCT pO2, Arterial 147 (H) 85 - 95 mm Hg    POCT SO2, Arterial 100 94 - 100 %    POCT Oxy Hemoglobin, Arterial 97.2 94.0 - 98.0 %    POCT Hematocrit Calculated, Arterial 26.0 (L) 41.0 - 52.0 %    POCT Sodium, Arterial 137 136 - 145 mmol/L    POCT Potassium, Arterial 3.1 (L) 3.5 - 5.3 mmol/L    POCT Chloride, Arterial 103 98 - 107 mmol/L    POCT Ionized Calcium, Arterial 1.23 1.10 - 1.33 mmol/L    POCT Glucose, Arterial 508 (HH) 74 - 99 mg/dL    POCT Lactate, Arterial 9.9 (HH) 0.4 - 2.0 mmol/L    POCT Base Excess, Arterial -9.8 (L) -2.0 - 3.0 mmol/L    POCT HCO3 Calculated, Arterial 17.2 (L) 22.0 - 26.0 mmol/L    POCT Hemoglobin, Arterial 8.5 (L) 13.5 - 17.5 g/dL    POCT Anion Gap, Arterial 20 10 - 25 mmo/L    Patient Temperature 37.0 degrees Celsius    FiO2 100 %   Heparin Assay, UFH   Result Value Ref Range    Heparin Unfractionated 0.9 See Comment Below for Therapeutic Ranges IU/mL   Blood Gas Arterial Full Panel   Result Value Ref Range    POCT pH, Arterial 7.22 (LL) 7.38 - 7.42 pH    POCT pCO2, Arterial 39 38 - 42 mm Hg    POCT pO2, Arterial 241 (H) 85 - 95 mm Hg    POCT SO2, Arterial 98 94 - 100 %    POCT Oxy Hemoglobin, Arterial 98.0 94.0 - 98.0 %    POCT Hematocrit Calculated, Arterial 23.0 (L) 41.0 - 52.0 %    POCT Sodium, Arterial 137 136 - 145 mmol/L    POCT Potassium, Arterial 3.4 (L) 3.5 - 5.3 mmol/L    POCT Chloride, Arterial 103 98 - 107 mmol/L    POCT Ionized Calcium, Arterial 1.20 1.10 - 1.33 mmol/L    POCT Glucose, Arterial 493 (HH) 74 - 99 mg/dL    POCT Lactate, Arterial 10.1 (HH) 0.4 - 2.0 mmol/L    POCT Base Excess, Arterial -10.9 (L) -2.0 - 3.0 mmol/L    POCT HCO3 Calculated, Arterial 16.0 (L) 22.0 - 26.0 mmol/L    POCT Hemoglobin, Arterial 7.8 (L) 13.5 - 17.5 g/dL    POCT Anion Gap, Arterial 21 10 - 25 mmo/L    Patient Temperature 37.0 degrees Celsius    FiO2 100 %   POCT GLUCOSE   Result Value Ref  Range    POCT Glucose 388 (H) 74 - 99 mg/dL   Blood Gas Arterial Full Panel   Result Value Ref Range    POCT pH, Arterial 7.03 (LL) 7.38 - 7.42 pH    POCT pCO2, Arterial 39 38 - 42 mm Hg    POCT pO2, Arterial 146 (H) 85 - 95 mm Hg    POCT SO2, Arterial 99 94 - 100 %    POCT Oxy Hemoglobin, Arterial 95.3 94.0 - 98.0 %    POCT Hematocrit Calculated, Arterial 21.0 (L) 41.0 - 52.0 %    POCT Sodium, Arterial 135 (L) 136 - 145 mmol/L    POCT Potassium, Arterial 5.4 (H) 3.5 - 5.3 mmol/L    POCT Chloride, Arterial 104 98 - 107 mmol/L    POCT Ionized Calcium, Arterial 1.19 1.10 - 1.33 mmol/L    POCT Glucose, Arterial 428 (H) 74 - 99 mg/dL    POCT Lactate, Arterial 15.2 (HH) 0.4 - 2.0 mmol/L    POCT Base Excess, Arterial -19.0 (L) -2.0 - 3.0 mmol/L    POCT HCO3 Calculated, Arterial 10.3 (L) 22.0 - 26.0 mmol/L    POCT Hemoglobin, Arterial 7.0 (L) 13.5 - 17.5 g/dL    POCT Anion Gap, Arterial 26 (H) 10 - 25 mmo/L    Patient Temperature 37.0 degrees Celsius    FiO2 70 %   Renal Function Panel   Result Value Ref Range    Glucose 396 (H) 74 - 99 mg/dL    Sodium 138 136 - 145 mmol/L    Potassium 5.1 3.5 - 5.3 mmol/L    Chloride 104 98 - 107 mmol/L    Bicarbonate 10 (LL) 21 - 32 mmol/L    Anion Gap 29 (H) 10 - 20 mmol/L    Urea Nitrogen 30 (H) 6 - 23 mg/dL    Creatinine 2.30 (H) 0.50 - 1.30 mg/dL    eGFR 31 (L) >60 mL/min/1.73m*2    Calcium 8.2 (L) 8.6 - 10.6 mg/dL    Phosphorus 6.9 (H) 2.5 - 4.9 mg/dL    Albumin 2.7 (L) 3.4 - 5.0 g/dL   CBC   Result Value Ref Range    WBC 20.3 (H) 4.4 - 11.3 x10*3/uL    nRBC 0.5 (H) 0.0 - 0.0 /100 WBCs    RBC 2.28 (L) 4.50 - 5.90 x10*6/uL    Hemoglobin 6.7 (L) 13.5 - 17.5 g/dL    Hematocrit 19.8 (L) 41.0 - 52.0 %    MCV 87 80 - 100 fL    MCH 29.4 26.0 - 34.0 pg    MCHC 33.8 32.0 - 36.0 g/dL    RDW 13.5 11.5 - 14.5 %    Platelets 374 150 - 450 x10*3/uL   Prepare RBC: 1 Units   Result Value Ref Range    PRODUCT CODE V6354Y44     Unit Number [id number]     Unit ABO O     Unit RH POS     XM INTEP  COMP     Dispense Status IS     Blood Expiration Date April 16, 2024 23:59 EDT     PRODUCT BLOOD TYPE 5100     UNIT VOLUME 350    Type and Screen   Result Value Ref Range    ABO TYPE O     Rh TYPE POS     ANTIBODY SCREEN NEG    Prepare RBC: 1 Units   Result Value Ref Range    PRODUCT CODE O8701H51     Unit Number [id number]     Unit ABO O     Unit RH POS     XM INTEP COMP     Dispense Status TR     Blood Expiration Date April 03, 2024 23:59 EDT     PRODUCT BLOOD TYPE 5100     UNIT VOLUME 284    POCT GLUCOSE   Result Value Ref Range    POCT Glucose 293 (H) 74 - 99 mg/dL   Prepare RBC   Result Value Ref Range    PRODUCT CODE U3223A43     Unit Number [id number]     Unit ABO O     Unit RH NEG     Dispense Status EI     Blood Expiration Date March 21, 2024 23:59 EDT     PRODUCT BLOOD TYPE 9500     UNIT VOLUME 292     PRODUCT CODE F8126K65     Unit Number [id number]     Unit ABO O     Unit RH POS     Dispense Status EI     Blood Expiration Date April 10, 2024 23:59 EDT     PRODUCT BLOOD TYPE 5100     UNIT VOLUME 278    POCT GLUCOSE   Result Value Ref Range    POCT Glucose 270 (H) 74 - 99 mg/dL   Prepare RBC: 4 Units   Result Value Ref Range    PRODUCT CODE M3158B06     Unit Number [id number]     Unit ABO O     Unit RH POS     XM INTEP COMP     Dispense Status IS     Blood Expiration Date April 09, 2024 23:59 EDT     PRODUCT BLOOD TYPE 5100     UNIT VOLUME 350     PRODUCT CODE C3847E22     Unit Number [id number]     Unit ABO O     Unit RH POS     XM INTEP COMP     Dispense Status IS     Blood Expiration Date April 09, 2024 23:59 EDT     PRODUCT BLOOD TYPE 5100     UNIT VOLUME 350     PRODUCT CODE Z4877J14     Unit Number [id number]     Unit ABO O     Unit RH POS     XM INTEP COMP     Dispense Status IS     Blood Expiration Date April 04, 2024 23:59 EDT     PRODUCT BLOOD TYPE 5100     UNIT VOLUME 279     PRODUCT CODE A2385G24     Unit Number [id number]     Unit ABO O     Unit RH POS     XM  INTEP COMP     Dispense Status IS     Blood Expiration Date April 09, 2024 23:59 EDT     PRODUCT BLOOD TYPE 5100     UNIT VOLUME 350    Prepare Platelets: 1 Units   Result Value Ref Range    PRODUCT CODE Y9236T62     Unit Number [id number]     Unit ABO O     Unit RH POS     Dispense Status IS     Blood Expiration Date March 21, 2024 23:59 EDT     PRODUCT BLOOD TYPE 5100     UNIT VOLUME 270    Prepare Plasma: 4 Units   Result Value Ref Range    PRODUCT CODE G2394X05     Unit Number [id number]     Unit ABO O     Unit RH POS     Dispense Status IS     Blood Expiration Date March 26, 2024 06:22 EDT     PRODUCT BLOOD TYPE 5100     UNIT VOLUME 226     PRODUCT CODE Q3551T66     Unit Number [id number]     Unit ABO A     Unit RH POS     Dispense Status IS     Blood Expiration Date March 26, 2024 07:29 EDT     PRODUCT BLOOD TYPE 6200     UNIT VOLUME 315     PRODUCT CODE I5701R76     Unit Number [id number]     Unit ABO A     Unit RH POS     Dispense Status IS     Blood Expiration Date March 26, 2024 07:29 EDT     PRODUCT BLOOD TYPE 6200     UNIT VOLUME 318     PRODUCT CODE C1604P98     Unit Number [id number]     Unit ABO A     Unit RH POS     Dispense Status IS     Blood Expiration Date March 26, 2024 07:29 EDT     PRODUCT BLOOD TYPE 6200     UNIT VOLUME 333    Blood Gas Arterial Full Panel   Result Value Ref Range    POCT pH, Arterial 6.96 (LL) 7.38 - 7.42 pH    POCT pCO2, Arterial 38 38 - 42 mm Hg    POCT pO2, Arterial 117 (H) 85 - 95 mm Hg    POCT SO2, Arterial 98 94 - 100 %    POCT Oxy Hemoglobin, Arterial 95.6 94.0 - 98.0 %    POCT Hematocrit Calculated, Arterial 16.0 (L) 41.0 - 52.0 %    POCT Sodium, Arterial 138 136 - 145 mmol/L    POCT Potassium, Arterial 4.7 3.5 - 5.3 mmol/L    POCT Chloride, Arterial 102 98 - 107 mmol/L    POCT Ionized Calcium, Arterial 1.10 1.10 - 1.33 mmol/L    POCT Glucose, Arterial 267 (H) 74 - 99 mg/dL    POCT Lactate, Arterial >17.0 (HH) 0.4 - 2.0 mmol/L    POCT Base  Excess, Arterial -21.3 (L) -2.0 - 3.0 mmol/L    POCT HCO3 Calculated, Arterial 8.5 (L) 22.0 - 26.0 mmol/L    POCT Hemoglobin, Arterial 5.4 (LL) 13.5 - 17.5 g/dL    POCT Anion Gap, Arterial 32 (H) 10 - 25 mmo/L    Patient Temperature 37.0 degrees Celsius    FiO2 70 %   Troponin I, High Sensitivity   Result Value Ref Range    Troponin I, High Sensitivity 13,624 (HH) 0 - 53 ng/L   Renal Function Panel   Result Value Ref Range    Glucose 295 (H) 74 - 99 mg/dL    Sodium 141 136 - 145 mmol/L    Potassium 5.0 3.5 - 5.3 mmol/L    Chloride 101 98 - 107 mmol/L    Bicarbonate 10 (LL) 21 - 32 mmol/L    Anion Gap 35 (H) 10 - 20 mmol/L    Urea Nitrogen 28 (H) 6 - 23 mg/dL    Creatinine 2.55 (H) 0.50 - 1.30 mg/dL    eGFR 28 (L) >60 mL/min/1.73m*2    Calcium 7.7 (L) 8.6 - 10.6 mg/dL    Phosphorus 8.8 (H) 2.5 - 4.9 mg/dL    Albumin 3.0 (L) 3.4 - 5.0 g/dL   Magnesium   Result Value Ref Range    Magnesium 2.59 (H) 1.60 - 2.40 mg/dL   Heparin Assay, UFH   Result Value Ref Range    Heparin Unfractionated 0.9 See Comment Below for Therapeutic Ranges IU/mL   Coagulation Screen   Result Value Ref Range    Protime 34.4 (H) 9.8 - 12.8 seconds    INR 3.0 (H) 0.9 - 1.1    aPTT >200 (HH) 27 - 38 seconds   CBC   Result Value Ref Range    WBC 19.9 (H) 4.4 - 11.3 x10*3/uL    nRBC 1.1 (H) 0.0 - 0.0 /100 WBCs    RBC 2.06 (L) 4.50 - 5.90 x10*6/uL    Hemoglobin 6.1 (LL) 13.5 - 17.5 g/dL    Hematocrit 19.4 (L) 41.0 - 52.0 %    MCV 94 80 - 100 fL    MCH 29.6 26.0 - 34.0 pg    MCHC 31.4 (L) 32.0 - 36.0 g/dL    RDW 13.8 11.5 - 14.5 %    Platelets 291 150 - 450 x10*3/uL   Calcium, Ionized   Result Value Ref Range    POCT Calcium, Ionized 0.98 (L) 1.1 - 1.33 mmol/L   Blood Gas Arterial Full Panel   Result Value Ref Range    POCT pH, Arterial 7.08 (LL) 7.38 - 7.42 pH    POCT pCO2, Arterial 46 (H) 38 - 42 mm Hg    POCT pO2, Arterial 75 (L) 85 - 95 mm Hg    POCT SO2, Arterial 96 94 - 100 %    POCT Oxy Hemoglobin, Arterial 93.3 (L) 94.0 - 98.0 %    POCT  Hematocrit Calculated, Arterial 20.0 (L) 41.0 - 52.0 %    POCT Sodium, Arterial 141 136 - 145 mmol/L    POCT Potassium, Arterial 3.7 3.5 - 5.3 mmol/L    POCT Chloride, Arterial 102 98 - 107 mmol/L    POCT Ionized Calcium, Arterial 0.99 (L) 1.10 - 1.33 mmol/L    POCT Glucose, Arterial 262 (H) 74 - 99 mg/dL    POCT Lactate, Arterial >17.0 (HH) 0.4 - 2.0 mmol/L    POCT Base Excess, Arterial -15.1 (L) -2.0 - 3.0 mmol/L    POCT HCO3 Calculated, Arterial 13.6 (L) 22.0 - 26.0 mmol/L    POCT Hemoglobin, Arterial 6.5 (LL) 13.5 - 17.5 g/dL    POCT Anion Gap, Arterial 29 (H) 10 - 25 mmo/L    Patient Temperature 37.0 degrees Celsius    FiO2 70 %   Transthoracic Echo (TTE) Limited   Result Value Ref Range    Tricuspid annular plane systolic excursion 1.7 cm    RV free wall pk S' 12.50 cm/s    LVIDd 4.80 cm   Prepare RBC: 4 Units   Result Value Ref Range    PRODUCT CODE R4858H64     Unit Number [id number]     Unit ABO O     Unit RH POS     XM INTEP COMP     Dispense Status IS     Blood Expiration Date April 16, 2024 23:59 EDT     PRODUCT BLOOD TYPE 5100     UNIT VOLUME 350     PRODUCT CODE J4971N77     Unit Number [id number]     Unit ABO O     Unit RH POS     XM INTEP COMP     Dispense Status IS     Blood Expiration Date April 16, 2024 23:59 EDT     PRODUCT BLOOD TYPE 5100     UNIT VOLUME 350     PRODUCT CODE C5813Z18     Unit Number [id number]     Unit ABO O     Unit RH POS     XM INTEP COMP     Dispense Status IS     Blood Expiration Date April 16, 2024 23:59 EDT     PRODUCT BLOOD TYPE 5100     UNIT VOLUME 350     PRODUCT CODE T7456X16     Unit Number [id number]     Unit ABO O     Unit RH POS     XM INTEP COMP     Dispense Status IS     Blood Expiration Date April 16, 2024 23:59 EDT     PRODUCT BLOOD TYPE 5100     UNIT VOLUME 350    Prepare Plasma   Result Value Ref Range    PRODUCT CODE A2299J57     Unit Number [id number]     Unit ABO A     Unit RH POS     Dispense Status IS     Blood Expiration Date  March 26, 2024 07:30 EDT     PRODUCT BLOOD TYPE 6200     UNIT VOLUME 289     PRODUCT CODE [id number]     Unit Number [id number]     Unit ABO A     Unit RH POS     Dispense Status IS     Blood Expiration Date March 26, 2024 07:30 EDT     PRODUCT BLOOD TYPE 6200     UNIT VOLUME 299     PRODUCT CODE G0468C34     Unit Number [id number]     Unit ABO A     Unit RH NEG     Dispense Status IS     Blood Expiration Date March 26, 2024 07:29 EDT     PRODUCT BLOOD TYPE 0600     UNIT VOLUME 341     PRODUCT CODE Q6894Z97     Unit Number [id number]     Unit ABO A     Unit RH POS     Dispense Status IS     Blood Expiration Date March 22, 2024 23:15 EDT     PRODUCT BLOOD TYPE 6200     UNIT VOLUME 351    Prepare Platelets   Result Value Ref Range    PRODUCT CODE U8882L52     Unit Number [id number]     Unit ABO O     Unit RH POS     Dispense Status IS     Blood Expiration Date March 21, 2024 23:59 EDT     PRODUCT BLOOD TYPE 5100     UNIT VOLUME 256    Prepare RBC   Result Value Ref Range    PRODUCT CODE C9859A42     Unit Number [id number]     Unit ABO O     Unit RH POS     XM INTE[facility] COMP     Dispense Status IS     Blood Expiration Date April 16, 2024 23:59 EDT     PRODUCT BLOOD TYPE 5100     UNIT VOLUME 350     PRODUCT CODE Z9740J25     Unit Number [id number]     Unit ABO O     Unit RH POS     XM INTEP COMP     Dispense Status IS     Blood Expiration Date April 16, 2024 23:59 EDT     PRODUCT BLOOD TYPE 5100     UNIT VOLUME 350     PRODUCT CODE X6837U55     Unit Number [id number]     Unit ABO O     Unit RH POS     XM INTE[facility] COMP     Dispense Status IS     Blood Expiration Date April 16, 2024 23:59 EDT     PRODUCT BLOOD TYPE 5100     UNIT VOLUME 350    Prepare Plasma   Result Value Ref Range    PRODUCT CODE R4653W05     Unit Number [id number]     Unit ABO A     Unit RH POS     Dispense Status IS     Blood Expiration Date March 26, 2024 07:30 EDT     PRODUCT BLOOD TYPE 6200     UNIT VOLUME 318      PRODUCT CODE R3131J58     Unit Number [id number]     Unit ABO A     Unit RH NEG     Dispense Status IS     Blood Expiration Date March 25, 2024 11:22 EDT     PRODUCT BLOOD TYPE 0600     UNIT VOLUME 313     PRODUCT CODE Y3591Q00     Unit Number [id number]     Unit ABO AB     Unit RH POS     Dispense Status IS     Blood Expiration Date March 23, 2024 07:26 EDT     PRODUCT BLOOD TYPE 8400     UNIT VOLUME 254     PRODUCT CODE U9826H94     Unit Number [id number]     Unit ABO AB     Unit RH POS     Dispense Status IS     Blood Expiration Date March 23, 2024 07:26 EDT     PRODUCT BLOOD TYPE 8400     UNIT VOLUME 317    Blood Gas Arterial Full Panel   Result Value Ref Range    POCT pH, Arterial 7.02 (LL) 7.38 - 7.42 pH    POCT pCO2, Arterial 44 (H) 38 - 42 mm Hg    POCT pO2, Arterial 71 (L) 85 - 95 mm Hg    POCT SO2, Arterial 95 94 - 100 %    POCT Oxy Hemoglobin, Arterial 93.4 (L) 94.0 - 98.0 %    POCT Hematocrit Calculated, Arterial 23.0 (L) 41.0 - 52.0 %    POCT Sodium, Arterial 140 136 - 145 mmol/L    POCT Potassium, Arterial 3.5 3.5 - 5.3 mmol/L    POCT Chloride, Arterial 104 98 - 107 mmol/L    POCT Ionized Calcium, Arterial 0.95 (L) 1.10 - 1.33 mmol/L    POCT Glucose, Arterial 263 (H) 74 - 99 mg/dL    POCT Lactate, Arterial 14.1 (HH) 0.4 - 2.0 mmol/L    POCT Base Excess, Arterial -18.3 (L) -2.0 - 3.0 mmol/L    POCT HCO3 Calculated, Arterial 11.4 (L) 22.0 - 26.0 mmol/L    POCT Hemoglobin, Arterial 7.7 (L) 13.5 - 17.5 g/dL    POCT Anion Gap, Arterial 28 (H) 10 - 25 mmo/L    Patient Temperature 37.0 degrees Celsius    FiO2 70 %   Prepare Plasma   Result Value Ref Range    PRODUCT CODE X4043AM8     Unit Number [id number]     Unit ABO A     Unit RH POS     Dispense Status XM     Blood Expiration Date March 22, 2024 23:15 EDT     PRODUCT BLOOD TYPE 6200     UNIT VOLUME 201     PRODUCT CODE T7793A80     Unit Number [id number]     Unit ABO A     Unit RH POS     Dispense Status XM     Blood Expiration  Date March 22, 2024 23:15 EDT     PRODUCT BLOOD TYPE 6200     UNIT VOLUME 326     PRODUCT CODE P9014E88     Unit Number [id number]     Unit ABO A     Unit RH POS     Dispense Status XM     Blood Expiration Date March 22, 2024 23:15 EDT     PRODUCT BLOOD TYPE 6200     UNIT VOLUME 322     PRODUCT CODE F4815Q06     Unit Number [id number]     Unit ABO AB     Unit RH POS     Dispense Status XM     Blood Expiration Date March 22, 2024 09:03 EDT     PRODUCT BLOOD TYPE 8400     UNIT VOLUME 208     PRODUCT CODE S2713HQ5     Unit Number [id number]     Unit ABO AB     Unit RH NEG     Dispense Status XM     Blood Expiration Date March 22, 2024 09:03 EDT     PRODUCT BLOOD TYPE 2800     UNIT VOLUME 205    Prepare Platelets   Result Value Ref Range    PRODUCT CODE R7432N64     Unit Number [id number]     Unit ABO O     Unit RH POS     Dispense Status XM     Blood Expiration Date March 21, 2024 23:59 EDT     PRODUCT BLOOD TYPE 5100     UNIT VOLUME 282    Prepare Cryoprecipitated AHF   Result Value Ref Range    PRODUCT CODE P3923N28     Unit Number [id number]     Unit ABO A     Unit RH POS     Dispense Status XM     Blood Expiration Date March 21, 2024 13:27 EDT     PRODUCT BLOOD TYPE 6200     UNIT VOLUME 71    Blood Gas Arterial Full Panel   Result Value Ref Range    POCT pH, Arterial 6.92 (LL) 7.38 - 7.42 pH    POCT pCO2, Arterial 65 (H) 38 - 42 mm Hg    POCT pO2, Arterial 86 85 - 95 mm Hg    POCT SO2, Arterial 98 94 - 100 %    POCT Oxy Hemoglobin, Arterial 95.2 94.0 - 98.0 %    POCT Hematocrit Calculated, Arterial 29.0 (L) 41.0 - 52.0 %    POCT Sodium, Arterial 139 136 - 145 mmol/L    POCT Potassium, Arterial 4.4 3.5 - 5.3 mmol/L    POCT Chloride, Arterial 103 98 - 107 mmol/L    POCT Ionized Calcium, Arterial 0.96 (L) 1.10 - 1.33 mmol/L    POCT Glucose, Arterial 299 (H) 74 - 99 mg/dL    POCT Lactate, Arterial 14.8 (HH) 0.4 - 2.0 mmol/L    POCT Base Excess, Arterial -18.9 (L) -2.0 - 3.0 mmol/L    POCT HCO3  Calculated, Arterial 13.3 (L) 22.0 - 26.0 mmol/L    POCT Hemoglobin, Arterial 9.8 (L) 13.5 - 17.5 g/dL    POCT Anion Gap, Arterial 27 (H) 10 - 25 mmo/L    Patient Temperature 37.0 degrees Celsius    FiO2 100 %   Prepare RBC   Result Value Ref Range    PRODUCT CODE M9571B21     Unit Number [id number]     Unit ABO O     Unit RH POS     XM INTEP COMP     Dispense Status XM     Blood Expiration Date April 17, 2024 23:59 EDT     PRODUCT BLOOD TYPE 5100     UNIT VOLUME 350     PRODUCT CODE E2011K56     Unit Number [id number]     Unit ABO O     Unit RH POS     XM INTEP COMP     Dispense Status XM     Blood Expiration Date April 19, 2024 23:59 EDT     PRODUCT BLOOD TYPE 5100     UNIT VOLUME 350     PRODUCT CODE H5652R09     Unit Number [id number]     Unit ABO O     Unit RH POS     XM INTEP COMP     Dispense Status XM     Blood Expiration Date April 19, 2024 23:59 EDT     PRODUCT BLOOD TYPE 5100     UNIT VOLUME 350     PRODUCT CODE S2205T17     Unit Number [id number]     Unit ABO O     Unit RH POS     XM INTEP COMP     Dispense Status XM     Blood Expiration Date April 19, 2024 23:59 EDT     PRODUCT BLOOD TYPE 5100     UNIT VOLUME 350     PRODUCT CODE Z4538L72     Unit Number [id number]     Unit ABO O     Unit RH POS     XM INTEP COMP     Dispense Status XM     Blood Expiration Date April 19, 2024 23:59 EDT     PRODUCT BLOOD TYPE 5100     UNIT VOLUME 350        Transthoracic Echo (TTE) Limited    Result Date: 3/21/2024   [facility], [address] [place] [address]                Tel [phone] and Fax [phone] TRANSTHORACIC ECHOCARDIOGRAM REPORT  Patient Name:      YVETTE Clinton Physician:    [id number] Derrell Negron MD Study Date:        3/21/2024            Ordering Provider:    [id number] KELLY ELISE MRN/PID:           [id number]              Fellow:               [id number] Alessandro Wise MD Accession#:        [id number]         Nurse: Date of Birth/Age: 1960 / 63 years Sonographer:          Katelyn Aguiar RDCS, RVT Gender:            M                    Additional Staff: Height:            177.80 cm            Admit Date:           2/28/2024 Weight:            100.70 kg            Admission Status:     Inpatient - STAT BSA / BMI:         2.18 m2 / 31.85      Encounter#:           [id number]                    kg/m2                                         Department Location:  [facility] Blood Pressure: 119 /55 mmHg Study Type:    TRANSTHORACIC ECHO (TTE) LIMITED Diagnosis/ICD: Cardiac arrest, cause unspecified-I46.9; Other pulmonary embolism                without acute cor pulmonale-I26.99 Indication:    Status post cardiac arrest. PE. Right heart strain. CPT Code:      Echo Limited-65557; Color Doppler-83137; Doppler Limited-87095 Patient History: Pertinent History: Myxoid chondrosarcoma. HTN. DM. HLD. Study Detail: The following Echo studies were performed: 2D, M-Mode, Doppler and               color flow. Technically challenging study due to patient lying in               supine position and body habitus. Definity used as a contrast               agent for endocardial border definition.  PHYSICIAN INTERPRETATION: Left Ventricle: The left ventricular systolic function is normal. There are no regional wall motion abnormalities. The left ventricular cavity size is normal. Left ventricular diastolic filling was not assessed. Left Atrium: The left atrium is normal in size. Right Ventricle: The right ventricle is moderately enlarged. There is low normal right ventricular systolic function. There is a slight suggestion of a Townsend's sign on the apical views suggestive of RV strain although the function is low normal. Right  Atrium: The right atrium is mildly dilated. Aortic Valve: The aortic valve is trileaflet. There is trivial aortic valve regurgitation. Mitral Valve: The mitral valve is normal in structure. Mitral valve regurgitation was not assessed. Tricuspid Valve: The tricuspid valve is structurally normal. There is trace tricuspid regurgitation. The right ventricular systolic pressure is unable to be estimated. Pulmonic Valve: The pulmonic valve is not well visualized. The pulmonic valve regurgitation was not well visualized. Pericardium: There is a trivial pericardial effusion. Aorta: The aortic root is normal. Systemic Veins: The inferior vena cava appears to be of normal size. There is IVC inspiratory collapse greater than 50%. In comparison to the previous echocardiogram(s): There are no prior studies on this patient for comparison purposes.  CONCLUSIONS:  1. Left ventricular systolic function is normal.  2. Poorly visualized anatomical structures due to suboptimal image quality.  3. Moderately enlarged right ventricle.  4. There is a slight suggestion of a Townsend's sign on the apical views suggestive of RV strain although the function is low normal.  5. There is low normal right ventricular systolic function. QUANTITATIVE DATA SUMMARY: 2D MEASUREMENTS:                          Normal Ranges: Ao Root d:     3.50 cm   (2.0-3.7cm) LAs:           3.00 cm   (2.7-4.0cm) IVSd:          1.30 cm   (0.6-1.1cm) LVPWd:         1.00 cm   (0.6-1.1cm) LVIDd:         4.80 cm   (3.9-5.9cm) LVIDs:         2.90 cm LV Mass Index: 94.6 g/m2 LV % FS        39.6 % RA VOLUME BY A/L METHOD:                       Normal Ranges: RA Area A4C: 17.5 cm2 M-MODE MEASUREMENTS:                Normal Ranges: RVIDd: 3.50 cm (0.9-3.6cm) LV SYSTOLIC FUNCTION BY 2D PLANIMETRY (MOD):                     Normal Ranges: EF-A2C View: 59.5 %  RIGHT VENTRICLE: RV Basal 4.80 cm RV Mid   3.70 cm RV Major 8.0 cm TAPSE:   17.0 mm RV s'    0.12 m/s  [id number] Derrell Negron  MD Electronically signed on 3/21/2024 at 9:21:52 AM  ** Final **     Electrocardiogram, 12-lead PRN ACS symptoms    Result Date: 3/21/2024  Sinus tachycardia Left axis deviation Right bundle branch block Inferior infarct (cited on or before 20-MAR-2024) Anterolateral infarct (cited on or before 20-MAR-2024) Abnormal ECG When compared with ECG of 20-MAR-2024 18:41, No significant change was found    Electrocardiogram, 12-lead PRN ACS symptoms    Result Date: 3/21/2024  Sinus tachycardia Left axis deviation Right bundle branch block Inferior infarct (cited on or before 20-MAR-2024) Anteroseptal infarct (cited on or before 20-MAR-2024) Abnormal ECG When compared with ECG of 20-MAR-2024 16:21, (unconfirmed) Serial changes of Anteroseptal infarct Present    XR chest 1 view    Result Date: 3/21/2024  Interpreted By:  Nadege Parrish and Afshari Mirak Sohrab STUDY: XR CHEST 1 VIEW;  3/21/2024 3:16 am   INDICATION: Signs/Symptoms:daily icu.   COMPARISON: Chest x-ray 03/20/2024.   ACCESSION NUMBER(S): [id number]   ORDERING CLINICIAN: NILS LE   FINDINGS: AP radiograph of the chest was provided.   Endotracheal tube tip is approximately 4.2 cm above the ariadna.   An enteric tube courses below diaphragm with the tip beyond the field of view.   CARDIOMEDIASTINAL SILHOUETTE: Cardiomediastinal silhouette is normal in size and configuration.   LUNGS: Similar aeration of bilateral lungs with prominent perihilar interstitial markings. Left costophrenic angle is obscured by overlying cardiomediastinal silhouette. No focal consolidation, sizeable pleural effusion or pneumothorax.   ABDOMEN: No remarkable upper abdominal findings.   BONES: No acute osseous changes.       1. Persistent pulmonary edema. Left costophrenic angle is obscured which can be due to overlying cardiomediastinal silhouette versus small pleural effusion. No focal consolidation or pneumothorax.   I personally reviewed the images/study and I agree with the  findings as stated by resident physician Dr. Ga Noel . This study was interpreted at [place], [place].   MACRO: None   Signed by: Nadege Parrish 3/21/2024 8:19 AM Dictation workstation:   [id number]    XR abdomen 1 view    Result Date: 3/21/2024  Interpreted By:  Nadege Parrish and Ohs Zachary STUDY: XR ABDOMEN 1 VIEW;  3/20/2024 7:15 pm   INDICATION: Signs/Symptoms:enteral tube placement.   COMPARISON: Chest radiograph 03/06/2024.   ACCESSION NUMBER(S): [id number]   ORDERING CLINICIAN: GILSON DUTTON   FINDINGS: Enteric tube with distal tip and side port overlying the expected location of the stomach.   Nonobstructive bowel gas pattern. No evidence of intra-abdominal free air.   Visualized soft tissues and osseous structures are unremarkable.       1. Enteric tube with distal tip and side port overlying the expected location of the stomach. 2. Nonobstructive bowel gas pattern.   I personally reviewed the images/study and I agree with the findings as stated by Dr. Selvin Marcial. This study was interpreted at [place], [place].   MACRO: none   Signed by: Nadege Parrish 3/21/2024 8:19 AM Dictation workstation:   [id number]    XR chest 1 view    Result Date: 3/21/2024  Interpreted By:  Nadege Parrish and Ohs Zachary STUDY: XR CHEST 1 VIEW;  3/20/2024 7:04 pm   INDICATION: Signs/Symptoms:intubation s/p cardiac arrest.   COMPARISON: Chest radiograph 03/06/2024, CT chest 01/31/2024.   ACCESSION NUMBER(S): [id number]   ORDERING CLINICIAN: GILSON DUTTON   FINDINGS: AP radiograph of the chest was provided.   MEDICAL DEVICES: ETT distal tip 4.2 cm from the ariadna. Enteric tube overlies the expected location of the esophagus and stomach with distal tip not imaged.   CARDIOMEDIASTINAL SILHOUETTE: Heart is similarly enlarged when compared to prior chest radiograph 03/06/2024.   LUNGS: Low lung volumes contribute to  bronchovascular crowding. Interval increase in perihilar vascular congestion. Left costophrenic angle is obscured by overlying cardiac silhouette and/or effusion. The right costophrenic angle is clear. No focal consolidation or pneumothorax.   ABDOMEN: No remarkable upper abdominal findings.   BONES: No acute osseous changes.       1. Interval increase in perihilar vascular congestion. 2. Left costophrenic angle is obscured by overlying cardiac silhouette and/or pleural effusion. 3. Medical devices as above.   I personally reviewed the images/study and I agree with the findings as stated by Dr. Selvin Marcial. This study was interpreted at [place], [place].   MACRO: None   Signed by: Nadege Parrish 3/21/2024 8:18 AM Dictation workstation:   [id number]     Scheduled medications  acetaminophen, 1,000 mg, intravenous, q6h LEILA  calcium chloride, 1 g, intravenous, Once  calcium chloride, 1 g, intravenous, Once  EPINEPHrine HCl (PF), , ,   EPINEPHrine HCl (PF), , ,   [MAR Hold] insulin lispro, 0-10 Units, subcutaneous, q4h  [MAR Hold] midazolam, 1 mg, intravenous, Once  [MAR Hold] oxygen, , inhalation, Continuous - Inhalation  pantoprazole, 40 mg, intravenous, Daily  piperacillin-tazobactam, 3.375 g, intravenous, q6h  rocuronium, , ,   rocuronium, 100 mg, intravenous, Once  vancomycin, 1,000 mg, intravenous, Once      Continuous medications  angiotensin II (Giapreza) 2.5 mg in sodium chloride 0.9% 250 mL (0.01 mg/mL) infusion, 1.25-40 ng/kg/min, Last Rate: Stopped (03/21/24 0947)  EPINEPHrine, 0-2 mcg/kg/min, Last Rate: 0.2 mcg/kg/min (03/21/24 0952)  epoprostenol, 0.05 mcg/kg/min (Ideal), Last Rate: 0.05 mcg/kg/min (03/20/24 2347)  fentaNYL, 50 mcg/hr, Last Rate: 50 mcg/hr (03/21/24 0952)  [Held by provider] heparin, 0-4,500 Units/hr, Last Rate: Stopped (03/21/24 0900)  insulin regular infusion for cardiac surgery, 0-15 Units/hr, Last Rate: 19 Units/hr (03/21/24  0914)  norepinephrine, 0.01-3.3 mcg/kg/min  propofol, 25 mcg/kg/min, Last Rate: 25 mcg/kg/min (03/21/24 0900)  vasopressin, 0.01-0.06 Units/min, Last Rate: 0.06 Units/min (03/21/24 0900)      PRN medications  PRN medications: calcium gluconate, calcium gluconate, [MAR Hold] dextrose, [MAR Hold] dextrose, [MAR Hold] dextrose **OR** [MAR Hold] glucagon, EPINEPHrine HCl (PF), EPINEPHrine HCl (PF), [MAR Hold] glucagon, [Held by provider] heparin, [MAR Hold] HYDROmorphone, insulin regular, magnesium sulfate, magnesium sulfate, potassium chloride, potassium chloride, rocuronium             Assessment/Plan   Principal Problem:    Soft tissue sarcoma (CMS/HCC)  Active Problems:    Extraskeletal myxoid chondrosarcoma (CMS/HCC)    Cardiopulmonary arrest (CMS/HCC)    Assessment:  Jason Hollins is a 63 y.o. 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 Hawkins and Dr. Smith on 3/5/24.  Post operative course was uncomplicated and patient was on RNF until 3/20 for prolonged bed rest to avoid hip flexion to maintain flap integrity.  Patient was on prophylactic lovenox while on bedrest.     3/20: Now s/p CPR with ROSC after TPA, overnight started on heparin, epo, increased pressor requirements, increased swelling at flap site.   3/21: MTP transfusions. Return to OR for exploration of R Flap site. High pressors. On epo.         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 Hawkins and Dr. Smith on 3/5/24. Now intubated.   - sedation as needed to tolerate ETT   - close neuro monitoring, patient at high risk for cerebral edema   - PT/OT consult when  appropriate     CV: History of HTN, HLD. Acute cardiopulmonary arrest 2/2 to possible massive PE vs massive STEMI, received multiple rounds of CPR and one defibrillation.  Sustained ROSC achieved after TPA bolus. Now maintained on high dose levophed, epinephrine, vaso, angioT2  - continuous EKG/abp monitoring  - Goal map range 65-90  - wean pressors as able   - ECHO     PULM: Arrived to SICU intubated julio c-CPR. Concern for massive PE and so epo started overnight   - Wean FiO2 and epo as tolerated.    - additional pulm toilet prn.       GI:   - NPO   - PPI     : No history of renal disease. Baseline creatinine 0.6. Not making urine. 6L positive past 24hrs.  - Marino   - I/Os, if urine output does not  will need to consider CVVH to avoid becoming grossly volume overloaded, will monitor  - RFP BID and PRN  - Replete electrolytes to goal K>4, Mg>2, Phos>2.5, ionized Ca>1.10.     HEME: Patient was on ppx lovenox for DVT prophylaxis, concern for massive PE patient received bolus of TPA during CPR. Started on heparin infusion overnight given concern for PE. With bleeding this AM was given protamine.   - CBC, coags, I-Aureliano, fibrinogen q 4hrs until proven stable then can liberalize  - ongoing monitoring for s/s bleeding  - concern for active hemorrhage at flap site, returning to OR for exploration     ENDO: History of DM2. Hyperglycemic post CPR   - Consider insulin drip and transition off when stable and appropriate     ID: Afebrile.   - temp q4h, wbc daily  - ongoing monitoring for s/s infection  - zosyn and vancomycin prophylactic for now given multiple recent invasive procedures done emergently     Lines:  - right radial arterial line  - left subclavian central line  - PIV  - Right humerus IO      Dispo: ICU. Patient seen and discussed with ICU attending Dr. Moreno.     SICU phone [phone]    Fernandez Ríos, APRN-CNP, DNP

## 2024-03-21 NOTE — PROGRESS NOTES
"    Department of Plastic and Reconstructive Surgery  Daily Progress Note    Patient Name: Jason Hollins  MRN: [id number]  Date:  03/21/24     Subjective  Patient is intubated and unresponsive in bed. He was transferred to [facility] yesterday evening after the patient coded. He became dizzy, lethargic, slow to respond, denied any shortness of breath or chest pain prior to code.     Overnight Events  NAOE    Objective  Constitutional: patient is unresponsive and intubated  Eyes: EOMI, clear sclera  Head/Neck: NCAT  Cardiovascular: RRR  Respiratory/Thorax: intubated.  Gastrointestinal: Abdomen soft, ND/NT.   Musculoskeletal: NWB RLE. +SILT BLE. Intact RLE DP pulses. Compartments soft and compressible.   Neurological: intubated and unresponsive.  Psychological: Appropriate mood and behavior.  Skin: Warm and dry, no rashes.  Extremity: Pedicle flap to right gluteal region appropriate in color. Pedicle flap warm to touch, hard and unable to compress Flap, C/D/I. Noted radiation changes of tissue surrounding flap.      Vital Signs  /55   Pulse (!) 120   Temp 38.6 °C (101.5 °F) (Core)   Resp (!) 31   Ht 1.778 m (5' 10\")   Wt 101 kg (222 lb 14.2 oz)   SpO2 94%   BMI 31.98 kg/m²      Physical Exam     Diagnostics   Results for orders placed or performed during the hospital encounter of 03/05/24 (from the past 24 hour(s))   POCT GLUCOSE   Result Value Ref Range    POCT Glucose 154 (H) 74 - 99 mg/dL   POCT GLUCOSE   Result Value Ref Range    POCT Glucose 144 (H) 74 - 99 mg/dL   POCT GLUCOSE   Result Value Ref Range    POCT Glucose 130 (H) 74 - 99 mg/dL   POCT GLUCOSE   Result Value Ref Range    POCT Glucose 178 (H) 74 - 99 mg/dL   Blood Gas Venous Full Panel Unsolicited   Result Value Ref Range    POCT pH, Venous 7.16 (LL) 7.33 - 7.43 pH    POCT pCO2, Venous 115 (HH) 41 - 51 mm Hg    POCT pO2, Venous 33 (L) 35 - 45 mm Hg    POCT SO2, Venous 42 (L) 45 - 75 %    POCT Oxy Hemoglobin, Venous 41.2 (L) 45.0 - 75.0 %    POCT " Hematocrit Calculated, Venous 28.0 (L) 41.0 - 52.0 %    POCT Sodium, Venous 162 (HH) 136 - 145 mmol/L    POCT Potassium, Venous 4.1 3.5 - 5.3 mmol/L    POCT Chloride, Venous 113 (H) 98 - 107 mmol/L    POCT Ionized Calicum, Venous 2.90 (HH) 1.10 - 1.33 mmol/L    POCT Glucose, Venous 428 (H) 74 - 99 mg/dL    POCT Lactate, Venous 13.9 (HH) 0.4 - 2.0 mmol/L    POCT Base Excess, Venous 9.7 (H) -2.0 - 3.0 mmol/L    POCT HCO3 Calculated, Venous 41.0 (H) 22.0 - 26.0 mmol/L    POCT Hemoglobin, Venous 9.3 (L) 13.5 - 17.5 g/dL    POCT Anion Gap, Venous 12.0 10.0 - 25.0 mmol/L    Patient Temperature 37.0 degrees Celsius   Blood Gas Arterial Full Panel Unsolicited   Result Value Ref Range    POCT pH, Arterial 6.86 (LL) 7.38 - 7.42 pH    POCT pCO2, Arterial 66 (H) 38 - 42 mm Hg    POCT pO2, Arterial 64 (L) 85 - 95 mm Hg    POCT SO2, Arterial 82 (L) 94 - 100 %    POCT Oxy Hemoglobin, Arterial 79.9 (L) 94.0 - 98.0 %    POCT Hematocrit Calculated, Arterial 26.0 (L) 41.0 - 52.0 %    POCT Sodium, Arterial 143 136 - 145 mmol/L    POCT Potassium, Arterial 4.6 3.5 - 5.3 mmol/L    POCT Chloride, Arterial 110 (H) 98 - 107 mmol/L    POCT Ionized Calcium, Arterial 1.48 (H) 1.10 - 1.33 mmol/L    POCT Glucose, Arterial 390 (H) 74 - 99 mg/dL    POCT Lactate, Arterial 15.4 (HH) 0.4 - 2.0 mmol/L    POCT Base Excess, Arterial -20.9 (L) -2.0 - 3.0 mmol/L    POCT HCO3 Calculated, Arterial 11.8 (L) 22.0 - 26.0 mmol/L    POCT Hemoglobin, Arterial 8.6 (L) 13.5 - 17.5 g/dL    POCT Anion Gap, Arterial 26 (H) 10 - 25 mmo/L    Patient Temperature 37.0 degrees Celsius   Blood Gas Arterial Full Panel Unsolicited   Result Value Ref Range    POCT pH, Arterial 6.92 (LL) 7.38 - 7.42 pH    POCT pCO2, Arterial 66 (H) 38 - 42 mm Hg    POCT pO2, Arterial 85 85 - 95 mm Hg    POCT SO2, Arterial 93 (L) 94 - 100 %    POCT Oxy Hemoglobin, Arterial 91.6 (L) 94.0 - 98.0 %    POCT Hematocrit Calculated, Arterial 26.0 (L) 41.0 - 52.0 %    POCT Sodium, Arterial 145 136 - 145  mmol/L    POCT Potassium, Arterial 5.4 (H) 3.5 - 5.3 mmol/L    POCT Chloride, Arterial 108 (H) 98 - 107 mmol/L    POCT Ionized Calcium, Arterial 1.39 (H) 1.10 - 1.33 mmol/L    POCT Glucose, Arterial 367 (H) 74 - 99 mg/dL    POCT Lactate, Arterial 15.9 (HH) 0.4 - 2.0 mmol/L    POCT Base Excess, Arterial -18.4 (L) -2.0 - 3.0 mmol/L    POCT HCO3 Calculated, Arterial 13.5 (L) 22.0 - 26.0 mmol/L    POCT Hemoglobin, Arterial 8.6 (L) 13.5 - 17.5 g/dL    POCT Anion Gap, Arterial 29 (H) 10 - 25 mmo/L    Patient Temperature 37.0 degrees Celsius   Magnesium   Result Value Ref Range    Magnesium 2.14 1.60 - 2.40 mg/dL   Calcium, Ionized   Result Value Ref Range    POCT Calcium, Ionized 1.25 1.1 - 1.33 mmol/L   Renal Function Panel   Result Value Ref Range    Glucose 339 (H) 74 - 99 mg/dL    Sodium 145 136 - 145 mmol/L    Potassium 3.7 3.5 - 5.3 mmol/L    Chloride 107 98 - 107 mmol/L    Bicarbonate 15 (L) 21 - 32 mmol/L    Anion Gap 27 (H) 10 - 20 mmol/L    Urea Nitrogen 22 6 - 23 mg/dL    Creatinine 1.22 0.50 - 1.30 mg/dL    eGFR 67 >60 mL/min/1.73m*2    Calcium 9.0 8.6 - 10.6 mg/dL    Phosphorus 9.3 (H) 2.5 - 4.9 mg/dL    Albumin 2.8 (L) 3.4 - 5.0 g/dL   CBC   Result Value Ref Range    WBC 18.9 (H) 4.4 - 11.3 x10*3/uL    nRBC 0.4 (H) 0.0 - 0.0 /100 WBCs    RBC 3.76 (L) 4.50 - 5.90 x10*6/uL    Hemoglobin 10.3 (L) 13.5 - 17.5 g/dL    Hematocrit 33.5 (L) 41.0 - 52.0 %    MCV 89 80 - 100 fL    MCH 27.4 26.0 - 34.0 pg    MCHC 30.7 (L) 32.0 - 36.0 g/dL    RDW 13.5 11.5 - 14.5 %    Platelets 536 (H) 150 - 450 x10*3/uL   Blood Gas Arterial Full Panel   Result Value Ref Range    POCT pH, Arterial 7.03 (LL) 7.38 - 7.42 pH    POCT pCO2, Arterial 48 (H) 38 - 42 mm Hg    POCT pO2, Arterial 101 (H) 85 - 95 mm Hg    POCT SO2, Arterial 97 94 - 100 %    POCT Oxy Hemoglobin, Arterial 95.0 94.0 - 98.0 %    POCT Hematocrit Calculated, Arterial 32.0 (L) 41.0 - 52.0 %    POCT Sodium, Arterial 141 136 - 145 mmol/L    POCT Potassium, Arterial 3.9 3.5  - 5.3 mmol/L    POCT Chloride, Arterial 106 98 - 107 mmol/L    POCT Ionized Calcium, Arterial 1.26 1.10 - 1.33 mmol/L    POCT Glucose, Arterial 343 (H) 74 - 99 mg/dL    POCT Lactate, Arterial 14.3 (HH) 0.4 - 2.0 mmol/L    POCT Base Excess, Arterial -17.5 (L) -2.0 - 3.0 mmol/L    POCT HCO3 Calculated, Arterial 12.7 (L) 22.0 - 26.0 mmol/L    POCT Hemoglobin, Arterial 10.6 (L) 13.5 - 17.5 g/dL    POCT Anion Gap, Arterial 26 (H) 10 - 25 mmo/L    Patient Temperature 37.0 degrees Celsius    FiO2 100 %   Hepatic function panel   Result Value Ref Range    Albumin 2.8 (L) 3.4 - 5.0 g/dL    Bilirubin, Total 0.7 0.0 - 1.2 mg/dL    Bilirubin, Direct 0.3 0.0 - 0.3 mg/dL    Alkaline Phosphatase 211 (H) 33 - 136 U/L     (H) 10 - 52 U/L     (H) 9 - 39 U/L    Total Protein 5.0 (L) 6.4 - 8.2 g/dL   B-type natriuretic peptide   Result Value Ref Range    BNP 2 0 - 99 pg/mL   Troponin I, High Sensitivity   Result Value Ref Range    Troponin I, High Sensitivity 5,928 (HH) 0 - 53 ng/L   Blood Gas Arterial Full Panel   Result Value Ref Range    POCT pH, Arterial 7.14 (LL) 7.38 - 7.42 pH    POCT pCO2, Arterial 40 38 - 42 mm Hg    POCT pO2, Arterial 92 85 - 95 mm Hg    POCT SO2, Arterial 98 94 - 100 %    POCT Oxy Hemoglobin, Arterial 95.2 94.0 - 98.0 %    POCT Hematocrit Calculated, Arterial 29.0 (L) 41.0 - 52.0 %    POCT Sodium, Arterial 139 136 - 145 mmol/L    POCT Potassium, Arterial 3.4 (L) 3.5 - 5.3 mmol/L    POCT Chloride, Arterial 104 98 - 107 mmol/L    POCT Ionized Calcium, Arterial 1.25 1.10 - 1.33 mmol/L    POCT Glucose, Arterial 439 (H) 74 - 99 mg/dL    POCT Lactate, Arterial 12.6 (HH) 0.4 - 2.0 mmol/L    POCT Base Excess, Arterial -14.5 (L) -2.0 - 3.0 mmol/L    POCT HCO3 Calculated, Arterial 13.6 (L) 22.0 - 26.0 mmol/L    POCT Hemoglobin, Arterial 9.7 (L) 13.5 - 17.5 g/dL    POCT Anion Gap, Arterial 25 10 - 25 mmo/L    Patient Temperature 37.0 degrees Celsius    FiO2 100 %   ACTIVATED CLOTTING TIME LOW   Result  Value Ref Range    POCT Activated Clotting Time Low Range 294 (H) 89 - 169 sec   Magnesium   Result Value Ref Range    Magnesium 1.57 (L) 1.60 - 2.40 mg/dL   Calcium, Ionized   Result Value Ref Range    POCT Calcium, Ionized 1.20 1.1 - 1.33 mmol/L   Renal Function Panel   Result Value Ref Range    Glucose 489 (HH) 74 - 99 mg/dL    Sodium 137 136 - 145 mmol/L    Potassium 3.1 (L) 3.5 - 5.3 mmol/L    Chloride 102 98 - 107 mmol/L    Bicarbonate 21 21 - 32 mmol/L    Anion Gap 17 10 - 20 mmol/L    Urea Nitrogen 29 (H) 6 - 23 mg/dL    Creatinine 1.77 (H) 0.50 - 1.30 mg/dL    eGFR 43 (L) >60 mL/min/1.73m*2    Calcium 8.3 (L) 8.6 - 10.6 mg/dL    Phosphorus 4.2 2.5 - 4.9 mg/dL    Albumin 2.6 (L) 3.4 - 5.0 g/dL   CBC   Result Value Ref Range    WBC 20.8 (H) 4.4 - 11.3 x10*3/uL    nRBC 0.2 (H) 0.0 - 0.0 /100 WBCs    RBC 2.79 (L) 4.50 - 5.90 x10*6/uL    Hemoglobin 7.7 (L) 13.5 - 17.5 g/dL    Hematocrit 25.5 (L) 41.0 - 52.0 %    MCV 91 80 - 100 fL    MCH 27.6 26.0 - 34.0 pg    MCHC 30.2 (L) 32.0 - 36.0 g/dL    RDW 13.3 11.5 - 14.5 %    Platelets 438 150 - 450 x10*3/uL   Blood Gas Arterial Full Panel   Result Value Ref Range    POCT pH, Arterial 7.22 (LL) 7.38 - 7.42 pH    POCT pCO2, Arterial 42 38 - 42 mm Hg    POCT pO2, Arterial 147 (H) 85 - 95 mm Hg    POCT SO2, Arterial 100 94 - 100 %    POCT Oxy Hemoglobin, Arterial 97.2 94.0 - 98.0 %    POCT Hematocrit Calculated, Arterial 26.0 (L) 41.0 - 52.0 %    POCT Sodium, Arterial 137 136 - 145 mmol/L    POCT Potassium, Arterial 3.1 (L) 3.5 - 5.3 mmol/L    POCT Chloride, Arterial 103 98 - 107 mmol/L    POCT Ionized Calcium, Arterial 1.23 1.10 - 1.33 mmol/L    POCT Glucose, Arterial 508 (HH) 74 - 99 mg/dL    POCT Lactate, Arterial 9.9 (HH) 0.4 - 2.0 mmol/L    POCT Base Excess, Arterial -9.8 (L) -2.0 - 3.0 mmol/L    POCT HCO3 Calculated, Arterial 17.2 (L) 22.0 - 26.0 mmol/L    POCT Hemoglobin, Arterial 8.5 (L) 13.5 - 17.5 g/dL    POCT Anion Gap, Arterial 20 10 - 25 mmo/L    Patient  Temperature 37.0 degrees Celsius    FiO2 100 %   Heparin Assay, UFH   Result Value Ref Range    Heparin Unfractionated 0.9 See Comment Below for Therapeutic Ranges IU/mL   Blood Gas Arterial Full Panel   Result Value Ref Range    POCT pH, Arterial 7.22 (LL) 7.38 - 7.42 pH    POCT pCO2, Arterial 39 38 - 42 mm Hg    POCT pO2, Arterial 241 (H) 85 - 95 mm Hg    POCT SO2, Arterial 98 94 - 100 %    POCT Oxy Hemoglobin, Arterial 98.0 94.0 - 98.0 %    POCT Hematocrit Calculated, Arterial 23.0 (L) 41.0 - 52.0 %    POCT Sodium, Arterial 137 136 - 145 mmol/L    POCT Potassium, Arterial 3.4 (L) 3.5 - 5.3 mmol/L    POCT Chloride, Arterial 103 98 - 107 mmol/L    POCT Ionized Calcium, Arterial 1.20 1.10 - 1.33 mmol/L    POCT Glucose, Arterial 493 (HH) 74 - 99 mg/dL    POCT Lactate, Arterial 10.1 (HH) 0.4 - 2.0 mmol/L    POCT Base Excess, Arterial -10.9 (L) -2.0 - 3.0 mmol/L    POCT HCO3 Calculated, Arterial 16.0 (L) 22.0 - 26.0 mmol/L    POCT Hemoglobin, Arterial 7.8 (L) 13.5 - 17.5 g/dL    POCT Anion Gap, Arterial 21 10 - 25 mmo/L    Patient Temperature 37.0 degrees Celsius    FiO2 100 %   POCT GLUCOSE   Result Value Ref Range    POCT Glucose 388 (H) 74 - 99 mg/dL   Blood Gas Arterial Full Panel   Result Value Ref Range    POCT pH, Arterial 7.03 (LL) 7.38 - 7.42 pH    POCT pCO2, Arterial 39 38 - 42 mm Hg    POCT pO2, Arterial 146 (H) 85 - 95 mm Hg    POCT SO2, Arterial 99 94 - 100 %    POCT Oxy Hemoglobin, Arterial 95.3 94.0 - 98.0 %    POCT Hematocrit Calculated, Arterial 21.0 (L) 41.0 - 52.0 %    POCT Sodium, Arterial 135 (L) 136 - 145 mmol/L    POCT Potassium, Arterial 5.4 (H) 3.5 - 5.3 mmol/L    POCT Chloride, Arterial 104 98 - 107 mmol/L    POCT Ionized Calcium, Arterial 1.19 1.10 - 1.33 mmol/L    POCT Glucose, Arterial 428 (H) 74 - 99 mg/dL    POCT Lactate, Arterial 15.2 (HH) 0.4 - 2.0 mmol/L    POCT Base Excess, Arterial -19.0 (L) -2.0 - 3.0 mmol/L    POCT HCO3 Calculated, Arterial 10.3 (L) 22.0 - 26.0 mmol/L    POCT  Hemoglobin, Arterial 7.0 (L) 13.5 - 17.5 g/dL    POCT Anion Gap, Arterial 26 (H) 10 - 25 mmo/L    Patient Temperature 37.0 degrees Celsius    FiO2 70 %   Renal Function Panel   Result Value Ref Range    Glucose 396 (H) 74 - 99 mg/dL    Sodium 138 136 - 145 mmol/L    Potassium 5.1 3.5 - 5.3 mmol/L    Chloride 104 98 - 107 mmol/L    Bicarbonate 10 (LL) 21 - 32 mmol/L    Anion Gap 29 (H) 10 - 20 mmol/L    Urea Nitrogen 30 (H) 6 - 23 mg/dL    Creatinine 2.30 (H) 0.50 - 1.30 mg/dL    eGFR 31 (L) >60 mL/min/1.73m*2    Calcium 8.2 (L) 8.6 - 10.6 mg/dL    Phosphorus 6.9 (H) 2.5 - 4.9 mg/dL    Albumin 2.7 (L) 3.4 - 5.0 g/dL   CBC   Result Value Ref Range    WBC 20.3 (H) 4.4 - 11.3 x10*3/uL    nRBC 0.5 (H) 0.0 - 0.0 /100 WBCs    RBC 2.28 (L) 4.50 - 5.90 x10*6/uL    Hemoglobin 6.7 (L) 13.5 - 17.5 g/dL    Hematocrit 19.8 (L) 41.0 - 52.0 %    MCV 87 80 - 100 fL    MCH 29.4 26.0 - 34.0 pg    MCHC 33.8 32.0 - 36.0 g/dL    RDW 13.5 11.5 - 14.5 %    Platelets 374 150 - 450 x10*3/uL   Type and Screen   Result Value Ref Range    ABO TYPE O     Rh TYPE POS     ANTIBODY SCREEN NEG    Prepare RBC: 1 Units   Result Value Ref Range    PRODUCT CODE R1037P21     Unit Number [id number]     Unit ABO O     Unit RH POS     XM INTEP COMP     Dispense Status TR     Blood Expiration Date April 03, 2024 23:59 EDT     PRODUCT BLOOD TYPE 5100     UNIT VOLUME 284    POCT GLUCOSE   Result Value Ref Range    POCT Glucose 293 (H) 74 - 99 mg/dL   Prepare RBC   Result Value Ref Range    PRODUCT CODE B1607X12     Unit Number [id number]     Unit ABO O     Unit RH NEG     Dispense Status EI     Blood Expiration Date March 21, 2024 23:59 EDT     PRODUCT BLOOD TYPE 9500     UNIT VOLUME 292     PRODUCT CODE C2268S99     Unit Number [id number]     Unit ABO O     Unit RH POS     Dispense Status EI     Blood Expiration Date April 10, 2024 23:59 EDT     PRODUCT BLOOD TYPE 5100     UNIT VOLUME 278    POCT GLUCOSE   Result Value Ref Range    POCT Glucose  270 (H) 74 - 99 mg/dL   Prepare RBC: 4 Units   Result Value Ref Range    PRODUCT CODE C3220Y44     Unit Number [id number]     Unit ABO O     Unit RH POS     XM INTEP COMP     Dispense Status XM     Blood Expiration Date April 09, 2024 23:59 EDT     PRODUCT BLOOD TYPE 5100     UNIT VOLUME 350     PRODUCT CODE Q8128T90     Unit Number [id number]     Unit ABO O     Unit RH POS     XM INTEP COMP     Dispense Status XM     Blood Expiration Date April 09, 2024 23:59 EDT     PRODUCT BLOOD TYPE 5100     UNIT VOLUME 350     PRODUCT CODE S3813V42     Unit Number [id number]     Unit ABO O     Unit RH POS     XM INTEP COMP     Dispense Status XM     Blood Expiration Date April 04, 2024 23:59 EDT     PRODUCT BLOOD TYPE 5100     UNIT VOLUME 279     PRODUCT CODE B9618E92     Unit Number [id number]     Unit ABO O     Unit RH POS     XM INTEP COMP     Dispense Status XM     Blood Expiration Date April 09, 2024 23:59 EDT     PRODUCT BLOOD TYPE 5100     UNIT VOLUME 350    Prepare Platelets: 1 Units   Result Value Ref Range    PRODUCT CODE X1998A07     Unit Number [id number]     Unit ABO O     Unit RH POS     Dispense Status XM     Blood Expiration Date March 21, 2024 23:59 EDT     PRODUCT BLOOD TYPE 5100     UNIT VOLUME 270    Blood Gas Arterial Full Panel   Result Value Ref Range    POCT pH, Arterial 6.96 (LL) 7.38 - 7.42 pH    POCT pCO2, Arterial 38 38 - 42 mm Hg    POCT pO2, Arterial 117 (H) 85 - 95 mm Hg    POCT SO2, Arterial 98 94 - 100 %    POCT Oxy Hemoglobin, Arterial 95.6 94.0 - 98.0 %    POCT Hematocrit Calculated, Arterial 16.0 (L) 41.0 - 52.0 %    POCT Sodium, Arterial 138 136 - 145 mmol/L    POCT Potassium, Arterial 4.7 3.5 - 5.3 mmol/L    POCT Chloride, Arterial 102 98 - 107 mmol/L    POCT Ionized Calcium, Arterial 1.10 1.10 - 1.33 mmol/L    POCT Glucose, Arterial 267 (H) 74 - 99 mg/dL    POCT Lactate, Arterial >17.0 (HH) 0.4 - 2.0 mmol/L    POCT Base Excess, Arterial -21.3 (L) -2.0 - 3.0 mmol/L     POCT HCO3 Calculated, Arterial 8.5 (L) 22.0 - 26.0 mmol/L    POCT Hemoglobin, Arterial 5.4 (LL) 13.5 - 17.5 g/dL    POCT Anion Gap, Arterial 32 (H) 10 - 25 mmo/L    Patient Temperature 37.0 degrees Celsius    FiO2 70 %   Heparin Assay, UFH   Result Value Ref Range    Heparin Unfractionated 0.9 See Comment Below for Therapeutic Ranges IU/mL   Calcium, Ionized   Result Value Ref Range    POCT Calcium, Ionized 0.98 (L) 1.1 - 1.33 mmol/L   Blood Gas Arterial Full Panel   Result Value Ref Range    POCT pH, Arterial 7.08 (LL) 7.38 - 7.42 pH    POCT pCO2, Arterial 46 (H) 38 - 42 mm Hg    POCT pO2, Arterial 75 (L) 85 - 95 mm Hg    POCT SO2, Arterial 96 94 - 100 %    POCT Oxy Hemoglobin, Arterial 93.3 (L) 94.0 - 98.0 %    POCT Hematocrit Calculated, Arterial 20.0 (L) 41.0 - 52.0 %    POCT Sodium, Arterial 141 136 - 145 mmol/L    POCT Potassium, Arterial 3.7 3.5 - 5.3 mmol/L    POCT Chloride, Arterial 102 98 - 107 mmol/L    POCT Ionized Calcium, Arterial 0.99 (L) 1.10 - 1.33 mmol/L    POCT Glucose, Arterial 262 (H) 74 - 99 mg/dL    POCT Lactate, Arterial >17.0 (HH) 0.4 - 2.0 mmol/L    POCT Base Excess, Arterial -15.1 (L) -2.0 - 3.0 mmol/L    POCT HCO3 Calculated, Arterial 13.6 (L) 22.0 - 26.0 mmol/L    POCT Hemoglobin, Arterial 6.5 (LL) 13.5 - 17.5 g/dL    POCT Anion Gap, Arterial 29 (H) 10 - 25 mmo/L    Patient Temperature 37.0 degrees Celsius    FiO2 70 %     XR chest 1 view    Result Date: 3/7/2024  Interpreted By:  Austin Ruiz, STUDY: XR CHEST 1 VIEW;  3/6/2024 11:00 pm   INDICATION: Signs/Symptoms:low grade fever, r/o PNA.   COMPARISON: CT chest 01/31/2024   ACCESSION NUMBER(S): [id number]   ORDERING CLINICIAN: BHARAT RANKIN   FINDINGS: AP radiograph of the chest   The patient is markedly rotated to the left. The heart is enlarged. No acute pulmonary infiltrates are noted. The left ventricle obscures the left costophrenic sulcus       1. No acute cardiopulmonary pathology       Signed by: Austin Ruiz 3/7/2024 8:09  AM Dictation workstation:   [id number]    CT angio aorta and bilateral iliofemoral runoff w and or wo IV contrast    Result Date: 2/28/2024  Interpreted By:  Elaine Maya, STUDY: CT ANGIO AORTA AND BILATERAL ILIOFEMORAL RUNOFF W AND OR WO IV CONTRAST;  2/28/2024 3:05 am   INDICATION: Signs/Symptoms:preop planning.   COMPARISON: MRI of the pelvis dated 01/31/2024   ACCESSION NUMBER(S): [id number]   ORDERING CLINICIAN: JARRED ADAMS   TECHNIQUE: Thin-section axial images of the abdomen, pelvis, bilateral lower extremities in the arterial phase after intravenous administration of lower osmolar agent  75 mL of Omnipaque 350 contrast.     CT Dose Reduction Employed: Yes, iterative reconstruction   For optimization of anatomic evaluation, multiplanar reconstruction, maximum intensity projections, and advanced 3-D off-line postprocessing were performed on a dedicated stand-alone workstation by the interpreting physician.   FINDINGS: VASCULATURE:   ABDOMINAL AORTA: No abdominal aortic aneurysm or dissection. No significant atherosclerosis. Mild tortuosity of the iliac vasculature is present. Celiac artery: No significant atherosclerotic calcification is noted along the celiac artery. The right hepatic artery appears to be originating from the SMA as a normal variant. Superior mesenteric artery: The superior mesenteric artery appears to be widely patent. As mentioned above, the right hepatic artery is originating from the SMA as a normal variant. Right renal artery: 3 renal arteries are noted supplying the right kidney. Left renal artery: A single left-sided renal artery is noted.   The inferior mesenteric artery is widely patent.   ABDOMINAL AND PELVIC ARTERIES:   - Right Common Iliac Artery:  No atherosclerosis No hemodynamically significant stenosis. - Right External Iliac Artery:  No significant atherosclerosis No hemodynamically significant stenosis. - Right Internal Iliac Artery:  No significant atherosclerosis No  hemodynamically significant stenosis. Branches of the right internal iliac artery appears to be in close proximity of the mass within the right gluteal region and likely providing supply via the inferior gluteal artery. The superior gluteal artery likely is not supplying the mass.   - Left Common Iliac Artery:  No atherosclerosis No hemodynamically significant stenosis. - Left External Iliac Artery:  No atherosclerosis No hemodynamically significant stenosis. - Left Internal Iliac Artery:  mild  atherosclerosis No hemodynamically significant stenosis.     RIGHT LOWER EXTREMITY:   - Common Femoral Artery:  Minimal atherosclerosis just before the bifurcation no hemodynamically significant stenosis. - Profunda Artery:  Minimal atherosclerosis No hemodynamically significant stenosis. Small branch of the profundus appears to be supplying the inferior aspect of the mass. - Superficial Femoral Artery:  mild atherosclerosis  No hemodynamically significant stenosis. - Popliteal Artery:  Mild-to-moderate atherosclerosis No hemodynamically significant stenosis. - Anterior Tibial:  mild atherosclerosis No hemodynamically significant stenosis. Is visualized toward the mid tibial shaft - Posterior Tibial:  Mild-to-moderate atherosclerosis No hemodynamically significant stenosis. Appears to be patent toward the foot, the distal aspect appears to be tortuous. - Peroneal Arteries: Mild-to-moderate atherosclerosis No hemodynamically significant stenosis. Patent to foot   LEFT LOWER EXTREMITY:   - Common Femoral Artery:  Trace atherosclerosis No hemodynamically significant stenosis. - Profunda Artery:  mild atherosclerosis No hemodynamically significant stenosis. - Superficial Femoral Artery: mild atherosclerosis No hemodynamically significant stenosis. - Popliteal Artery:  mild atherosclerosis No hemodynamically significant stenosis. - Anterior Tibial:  Trace atherosclerosis No hemodynamically significant stenosis. Patent to foot -  Posterior Tibial:  Mild-to-moderate atherosclerosis No hemodynamically significant stenosis. Patent to foot significant tortuosity of the artery distally at the level of the foot. - Peroneal Arteries: Mild-to-moderate atherosclerosis No hemodynamically significant stenosis. Visualized toward the mid fibular shaft   ABDOMEN AND PELVIS:   Lower chest: The visualized lung bases demonstrate no gross finding   Liver: No gross evidence of for hepatic masses given the limitation early arterial phase contrast CT   Biliary tree: No intrahepatic biliary dilatation, the common bile duct grossly unremarkable   Gallbladder: No gross abnormality   Pancreas: No gross abnormality   Spleen: No gross abnormality   Adrenal: The adrenal appears unremarkable   Kidneys: Symmetrical enhancement bilaterally is present. Presumably visualization of septated cyst in anterior aspect of the left kidney. Punctate nonobstructing stone is present in the midpole of the right kidney.   Peritoneum and retroperitoneum: No free fluid, free air and/or gross masses   Lymph nodes: Nonspecific small periaortic lymph nodes are present as well as marly hepatis, the appearance remain within normal limits.   Bowel: No abnormally dilated bowel segments are seen. No significant inflammatory changes are present. Extensive colonic diverticulosis are seen without secondary sign of acute diverticulitis.   Bladder: Is empty and cannot be evaluated   Pelvis: The visualized prostate measures approximately 5.1 cm. No free fluid is seen in the pelvis. Scattered small nonspecific inguinal lymph nodes.   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 Please refer to detailed MRI dated 01/31/2024. The mass abutting the inferior gluteal artery and likely supplied by branches of the inferior gluteal artery. Small accessory branches of the profunda appears to be abutting the most  inferior aspect of the mass.   Subcutaneous varicose veins are present in the lower extremities distally just above the feet. Bilateral varices within the feet are also present.       1. Overall, redemonstration of known right-sided gluteal mass appears to be predominantly supply of branches of the inferior gluteal artery. Small branches of the profound supplying the lower portion of the mass. 2. Peripheral vascular disease predominantly in the lower extremities, without gross evidence of significant narrowing or stenosis. Possible varicose veins distally in the bilateral lower extremities 3. Other nonspecific finding as detailed above.           Signed by: Elaine Maya 2/28/2024 10:36 AM Dictation workstation:   [id number]      Current Medications  Scheduled medications  [MAR Hold] acetaminophen, 1,000 mg, intravenous, q6h LEILA  acetaminophen, 1,000 mg, intravenous, q6h LEILA  [MAR Hold] alteplase (Activase) 50 mg in sterile water 50 mL (1 mg/mL) infusion, 50 mg, intravenous, Once  [MAR Hold] heparin, 10,000 Units, intravenous, Once  [MAR Hold] heparin, 80 Units/kg, intravenous, Once  [MAR Hold] insulin lispro, 0-10 Units, subcutaneous, q4h  [MAR Hold] midazolam, 1 mg, intravenous, Once  [MAR Hold] oxygen, , inhalation, Continuous - Inhalation  [MAR Hold] pantoprazole, 40 mg, intravenous, Daily  piperacillin-tazobactam, 4.5 g, intravenous, q6h  rocuronium, , ,       Continuous medications  angiotensin II (Giapreza) 2.5 mg in sodium chloride 0.9% 250 mL (0.01 mg/mL) infusion, 1.25-40 ng/kg/min, Last Rate: 80 ng/kg/min (03/21/24 0700)  EPINEPHrine, 0-2 mcg/kg/min, Last Rate: 0.8 mcg/kg/min (03/21/24 0700)  epoprostenol, 0.05 mcg/kg/min (Ideal), Last Rate: 0.05 mcg/kg/min (03/20/24 2347)  [Held by provider] heparin, 0-4,500 Units/hr, Last Rate: 1,600 Units/hr (03/21/24 0700)  insulin regular infusion for cardiac surgery, 0-15 Units/hr, Last Rate: 19 Units/hr (03/21/24 0700)  norepinephrine, 0-3.3 mcg/kg/min, Last Rate:  0.2 mcg/kg/min (03/21/24 0725)  propofol, 25 mcg/kg/min, Last Rate: 25 mcg/kg/min (03/21/24 0723)  vasopressin, 0.01-0.06 Units/min, Last Rate: 0.06 Units/min (03/21/24 0700)    PRN medications  PRN medications: calcium gluconate, calcium gluconate, [MAR Hold] dextrose, [MAR Hold] dextrose, [MAR Hold] dextrose **OR** [MAR Hold] glucagon, [MAR Hold] glucagon, heparin, [MAR Hold] HYDROmorphone, insulin regular, magnesium sulfate, magnesium sulfate, potassium chloride, potassium chloride, rocuronium     Assessment  Jason Hollins is a 63 y.o. male with a past medical history significant for DM II, HPL, HTN and gluteal myxoid chondrosarcoma s/p radiation. Patient taken to the OR on 3/5/2024 for wide resection of gluteal sarcoma and sciatic nerve neurolysis per Dr. Hawkins of Orthopedic Surgery followed by right gluteal reconstruction, pedicle ALT, vastus lateralus flap, pedicle gluteal and perisformis flap, pelvic closure and incisional wound vac placement per Dr. Smith of Plastic Surgery. Flap is hard to palpation, concerns for possible bleed under the flap.    Plan/Recommendations  # S/p right gluteal reconstruction, pedicle ALT, vastus lateralus flap, pedicle gluteal and perisformis flap with incisional wound vac placement   - Serial flap assessments Q6hour per nursing with interval checks per plastics       ·  Assess flap appearance (color, warmth, turgor) over windowed area (no doppler assessment required)        ·  Nursing to notify plastic surgery team immediately if there are any acute changes          (Including pallor, temperature change, bleeding, etc.)  - Incisional wound vac removed at bedside (3/19), no issues or complications. Surgical incisions healing well, C/D/I   - Maintain left lateral decubitus positioning to avoid pressure to flap region/incisions  - Discontinue use of knee immobilizer  - Discontinue bedrest today (3/19)  - PT/OT re-consulted with following restrictions:       ·  NWB RLE       ·  Avoid  prolonged hip flexion of the R leg. OK to flex hip to get out of bed and use bedside commode/toilet. May sit in a chair for 15-20 mins at a time with seat reclined and legs elevated        ·  OK for knee flexion and abduction/adduction of the R leg       ·  PT/OT recommend moderate intensity therapy post discharge  - Recommend DC purewick, now that patient able to lay supine, recommend urinal use  - Monitor VS/pulse ox [id number]   - Monitor AM CBC/RFP/Mg PRN   - Concern for bleed under flap      # Acute post op pain  - Pain control per ICU protocol      # Hx DM type II  - all medications were discontinued 3/20     # Hx hyperlipidemia   -Atorvastatin 40mg was discontinued 3/20     # Hx HTN  - BP stable, does not take any home medications  - Monitor VS [id number]      # Vitamin D deficiency  - Patient takes Vitamin D2 50,000 units twice monthly (15th and 30th)  - Dose ordered for 3/15, if patient remains in house, will need dose reordered for 3/30     FEN/GI:   - NPO     Prophylaxis:   - DVT: SQ Lovenox 40mg was discontinued 3/20, home ASA 81mg was discontinue 3/20, SCDs to RLE   - Encouraged use of IS (x10 every hour while awake)       Disposition: Continue care on RNF. Bedrest order discontinued on 3/19. PT/OT recommend moderate intensity therapy post DC. Medically stable for discharge to SNF pending pre-cert. Will need follow up appointment with plastic surgery and orthopedic surgery closer to anticipated date of discharge.      Plastic and Reconstructive Surgery   Zelda  Pager #[phone]  Team phones: [phone]

## 2024-03-21 NOTE — SIGNIFICANT EVENT
Returns from OR s/p exploratin of right thigh woundEvacuation of hematoma. Suture ligation of multiple bleeding vessel and several points in gluteal area. Findings: Actively Bleeding Gluteal Arterial Perforators, Generalized Oozing, healthy Flap     EBL 5L. Given PRBC 6, FFP 11, PLT 2, Cryo 4, Cell saver 2584ml, Albumin 1L, BiCarb 200meq, versed, Lasix 20mg with no response.    Pressor requirement has improved although still on high pressors inlcuding epi, norepi, vaso, and AT2.    Right thigh is still large, less firm.    Plan is to monitor for continued bleeding and supportive care. Nephrology consulted as likely will need CVVH to maintain even fluid balance. Edema is generalized and dramatically worse. Full plan to come.

## 2024-03-21 NOTE — BRIEF OP NOTE
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 Suture ligation of multiple bleeding vessel and several points in gluteal area  72810 - OR RPR BLOOD VESSEL DIRECT LOWER EXTREMITY      Surgeons      * Angy Smith - Primary    Resident/Fellow/Other Assistant:  Surgeon(s) and Role:     * Faviola Levy PA-C - CELESTINE First Assist    Procedure Summary  Anesthesia: General  ASA: V  Anesthesia Staff: Anesthesiologist: César Rodriguez MD; Donnell Leonard MD; Siobhan Medina MD; Oleksandr Aguilar MD  C-AA: GEETHA Fernandez  Anesthesia Resident: Dulce Kohler DO  Estimated Blood Loss: 6.5L (3L of which was retained Hematoma)  Intra-op Medications:   Administrations occurring from 1145 to 1745 on 24:   Medication Name Total Dose   hydrogen peroxide 3 % external solution 1 Application   acetaminophen (Ofirmev) injection 1,000 mg Cannot be calculated   angiotensin II (Giapreza) 2.5 mg in sodium chloride 0.9% 250 mL (0.01 mg/mL) infusion 236,340 ng   calcium chloride 1 g in dextrose 5 % in water (D5W) 50 mL IV Cannot be calculated   calcium chloride 100 mg/mL (10 %) injection 1 g Cannot be calculated   EPINEPHrine (Adrenalin) 10 mg in dextrose 5 % in water (D5W) 250 mL (0.04 mg/mL) infusion 4.26 mg   insulin lispro (HumaLOG) injection 0-10 Units Cannot be calculated   insulin regular 100 unit/100 mL (1 unit/mL) in 0.9 % NaCl infusion 38.5 Units   norepinephrine (Levophed) 16 mg in sodium chloride 0.9% 250 mL (0.064 mg/mL) infusion 4.03 mg   pantoprazole (ProtoNix) injection 40 mg Cannot be calculated   piperacillin-tazobactam-dextrose  (Zosyn) IV 3.375 g 3.375 g   propofol (Diprivan) infusion 1,862.55 mg   vancomycin (Vancocin) 1,000 mg in dextrose 5% water 200 mL Cannot be calculated   perflutren lipid microspheres (Definity) injection 0.5-10 mL of dilution 3 mL of dilution              Anesthesia Record               Intraprocedure I/O Totals          Intake    Fentanyl Drip 0.00 mL    The total shown is the total volume documented since Anesthesia Start was filed.    CRYOPRECIPITATE 260.00 mL    PLASMA 2400.00 mL    PLATELETS 250.00 mL    PRBC 1400.00 mL    Transfuse Plasma 900.00 mL    Transfuse Platelets 250.00 mL    Transfuse .00 mL    Norepinephrine Drip 0.00 mL    The total shown is the total volume documented since Anesthesia Start was filed.    angiotensin II (Giapreza) 2.5 mg in sodium chloride 0.9% 250 mL (0.01 mg/mL) infusion 131.76 mL    Angiotensin II 0.00 mL    The total shown is the total volume documented since Anesthesia Start was filed.    Insulin Drip 0.00 mL    The total shown is the total volume documented since Anesthesia Start was filed.    Epinephrine Drip 0.00 mL    The total shown is the total volume documented since Anesthesia Start was filed.    Propofol Drip 0.00 mL    The total shown is the total volume documented since Anesthesia Start was filed.    Vasopressin Drip 0.00 mL    The total shown is the total volume documented since Anesthesia Start was filed.    Cell Saver 2584 mL    Total Intake 8875.76 mL       Output    Urine 10 mL    Est. Blood Loss 6500 mL    Total Output 6510 mL       Net    Net Volume 2365.76 mL          Specimen: No specimens collected     Staff:   Circulator: Kacy Mendoza RN; Brittaney Lozano RN; Soo Montano RN; Lenny Mendoza RN; Tawana Ryan RN  Scrub Person: Soo Montano RN; Junior Martinez; Nikolay Balbuena RN          Findings: Actively Bleeding Gluteal Arterial Perforators, Generalized Oozing, healthy Flap    Complications:  None; patient tolerated the procedure well.      Disposition: ICU - intubated and critically ill.  Condition: stable  Specimens Collected: No specimens collected  Attending Attestation: A qualified resident physician was not available.    Angy Smith  Phone Number: [phone]

## 2024-03-21 NOTE — PROGRESS NOTES
SICU Attending Note    Jason Hollins is a 63 y.o. 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 Hawkins and Dr. Smith on 3/5/24.  Post operative course was uncomplicated and patient was on RNF until 3/20 for prolonged bed rest to avoid hip flexion to maintain flap integrity.  Patient was on prophylactic lovenox while on bedrest. Per nursing report, patient was getting to the side of the bed today to get up and walk when he had sudden chest heaviness. Pre-cardiac arrest EKG with signs of anterior ischemia. Nursing was called by family because patient went unresponsive.  ACLS was immediately started. ROSC was achieved after 1 round of CPR, however patient quickly decompensated into PEA.  Airway was secured.  Multiple rounds of CPR were completed with intermittent ROSC prior to transport to CTICU.  Patient arrived to CTICU under SICU care with active CPR underway.  Primary concern for STEMI vs massive PE.  Bedside POCUS TTE with biventricular failure and dilated RV.  RV septal bowing. Decided to give TPA to treat massive PE.  After several minutes of CPR patient achieved  sustained ROSC.  ** Please see code documentation note **     3/20: Now s/p CPR with ROSC after TPA, overnight started on heparin, epo, increased pressor requirements, increased swelling at flap site.   3/21: MTP transfusions. Return to OR for exploration of R Flap site. High pressors. On epo.    Patient awoke to command 45 minutes after ROSC achieved.  He had purposeful movements and followed commands.  He continues to be maintained on high dose epinepherine and levophed.      I evaluated the patient with an advanced practice provider. I oversaw this patient's care, and I participated actively their clinical course.    Plan:  Neuro: sedated on  propofol/fentanyl; NMB not reversed following OR; given tenuous status, will defer SAT now  Cardiovascular: remains on significant hemodynamic support albeit weaned extensively following treatment of hemorrhagic shock; maintain invasive access; continue to support MAP >65mmHg  Respiratory: hypoxemic respiratory failure, likely multifactorial in the setting of hypervolemia/RV strain  GI: NPO; PPI  : Marino required for strict I/O monitoring; oliguric/anuric overnight; likely will need to transition to RRT  Endo: ISS  Heme: acute blood loss anemia from surgical limb; continue to balance transfusion; no longer requiring MTP; given propensity for bleeding, currently actively holding all AC agents for 24 hours; will re-assess  ID: continue vancomycin/zosyn for broad coverage   Dispo: SICU Care; family members updated    Josh LEWIS  SICU Staff  P. [phone]    Due to the high probability of life threatening clinical decompensation, the patient required critical care time evaluating and managing this patient.  Critical care time included obtaining a history, examining the patient, ordering and reviewing studies, discussing, developing, and implementing a management plan, evaluating the patient's response to treatment, and discussion with other care team providers. I saw and evaluated the patient myself. I reviewed the provider's documentation and discussed the patient with the provider. Critical care time was performed exclusive of billable procedures.    Critical Care Time: 60 minutes

## 2024-03-21 NOTE — CONSULTS
VASCULAR SURGERY CONSULT NOTE  [facility]  HPI:  Jason Hollins is a 63 y.o. male with PMH of DM2, HLD, and myxoid chondrosarcoma s/p wide resection sarcoma, sciatic nerve neurolysis of RLE, with right gluteal reconstruction, pedicle ALT, vastus lateralus flap, pedicle gluteal and perisformis flap with Dr. Hawkins and Dr. Smith on 3/5/24. Was recovering on the floor when yesterday at approx 5pm patient suffered cardiopulmonary arrest, TPA given with ROSC after administration. Patient was discussed as ECMO candidate and interventional cardiology performed angiogram via R CFA access with 4Fr sheath which remains in place. Per ICU, not an ECMO candidate at this time. On high dose pressors and receiving MTP with hematoma of the R lateral thigh. Vascular surgery consulted with concern for bleeding from the femoral access. Patient remains intubated and on pressors in critical condition.    PMH:  DM2, HLD, myxoid chondrosarcoma    PSH:   wide resection sarcoma, sciatic nerve neurolysis of RLE, with right gluteal reconstruction, pedicle ALT, vastus lateralus flap, pedicle gluteal and perisformis flap    Objective   Heart Rate:  []   Temp:  [36 °C (96.8 °F)-38.6 °C (101.5 °F)]   Resp:  [12-34]   BP: ()/(48-90)   SpO2:  [79 %-100 %]     Physical Exam:  General: intubated  Neuro: unable to assess  HEENT: NC/AT  CV: tachycardic, on levo and epi  Pulse exam: palpable fems and DP bilaterally  Pulm: intuated  Abd: obese, soft, ND  Extr: RLE with significant swelling and induration of the lateral thigh, 4Fr CFA and 7Fr CFV sheath in place. Arterial waveform transduced from arterial sheath and blood return from venous.    ROS:  12-point review of systems was performed and is negative except as noted above.     Labs:  Results from last 7 days   Lab Units 03/21/24  0726 03/21/24  0509 03/21/24  0136   WBC AUTO x10*3/uL 19.9* 20.3* 20.8*   HEMOGLOBIN g/dL 6.1* 6.7* 7.7*   HEMATOCRIT % 19.4* 19.8*  25.5*   PLATELETS AUTO x10*3/uL 291 374 438     Results from last 7 days   Lab Units 03/21/24  0726 03/21/24  0509 03/21/24  0136   SODIUM mmol/L 141 138 137   POTASSIUM mmol/L 5.0 5.1 3.1*   CHLORIDE mmol/L 101 104 102   CO2 mmol/L 10* 10* 21   BUN mg/dL 28* 30* 29*   CREATININE mg/dL 2.55* 2.30* 1.77*   GLUCOSE mg/dL 295* 396* 489*   CALCIUM mg/dL 7.7* 8.2* 8.3*     Results from last 7 days   Lab Units 03/21/24  0726   APTT seconds >200*   INR  3.0*     Results from last 7 days   Lab Units 03/21/24  0726 03/21/24  0303   ANTI XA UNFRACTIONATED IU/mL 0.9 0.9       Assessment/Plan   63 y.o. male with with PMH of DM2, HLD, and myxoid chondrosarcoma s/p wide resection sarcoma, sciatic nerve neurolysis of RLE, with right gluteal reconstruction, pedicle ALT, vastus lateralus flap, pedicle gluteal and perisformis flap with Dr. Hawkins and Dr. Smith on 3/5/24 who suffered cardiac arrest on 3/20 and now with hemorrhagic shock. Bleeding more likely to be from operative site given location of the hematoma and confirmation of intraluminal placement of femoral sheaths.    Plan:  - recommend plastic surgery reexploration and vascular surgery will be available to assist intraoperatively if needed    D/w attending, Dr. Rogers Marion MD  General Surgery PGY-3  Vascular Surgery [phone]

## 2024-03-21 NOTE — PROGRESS NOTES
Spiritual Care Visit      introduced self and services to patient's family gathered in the conference room. Family expressed appreciation for [facility] in response to the code on 3/20 and felt very supported by the staff during and after.  passed along thoughts and prayers from [facility] 6 which the family appreciated. Family expressed no other needs at this time.  gave them contact information and encouraged them to reach out.     Rev. Socrates Velasquez, Supportive Oncology

## 2024-03-21 NOTE — ANESTHESIA PREPROCEDURE EVALUATION
Patient: Jason Hollins    Procedure Information       Date/Time: 03/21/24 1145    Procedures:       Femoral Artery Repair/Profundoplasty w/Angiogram (Right)      Angiogram (Right)    Location: [facility] OR  / [facility] OR    Surgeons: Angy Smith MD            Relevant Problems   Cardiovascular   (+) Cardiopulmonary arrest (CMS/HCC)   (+) Hyperlipidemia      Endocrine   (+) Class 2 obesity with body mass index (BMI) of 38.0 to 38.9 in adult   (+) DM type 2 without retinopathy (CMS/HCC)   (+) Diabetes mellitus type 2 in obese (CMS/HCC)   (+) Severe obesity (BMI 35.0-39.9) with comorbidity (CMS/HCC)   (+) Type 2 diabetes mellitus with hyperglycemia (CMS/HCC)      Infectious Disease   (+) Candidal intertrigo   (+) Viral illness       Clinical information reviewed:   Tobacco  Allergies  Meds   Med Hx  Surg Hx   Fam Hx  Soc Hx        NPO Detail:  No data recorded     Physical Exam    Airway  Mallampati: unable to assess  Comments: Intubated in icu   Cardiovascular    Dental    Pulmonary    Abdominal        Anesthesia Plan    History of general anesthesia?: yes  History of complications of general anesthesia?: unknown/emergency    ASA 5 - emergent     general   (Plan general endotracheal anesthesia with peripheral IV placement, arterial line use, central line use,  and ASA standard monitors.  The possibility of blood product transfusion is also present;  patient receiving blood products in ICU.  Emergent consent obtained per surgeons.  César Rodriguez MD)  Plan/risks discussed with: emergency procedure.    Blood Products Consent Comment: Receiving blood products in ICU  Plan discussed with resident.

## 2024-03-21 NOTE — ANESTHESIA POSTPROCEDURE EVALUATION
Patient: Jason Hollins    Procedure Summary       Date: 03/21/24 Room / Location: [facility] OR 17 / [facility] OR    Anesthesia Start: 1243 Anesthesia Stop: 1825    Procedure: Exploratin of right thigh woundEvacuation of hematoma. 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])      (Cardiopulmonary arrest (CMS/HCC) [I46.9])    Surgeons: Angy Smith MD Responsible Provider: Siobhan Medina MD    Anesthesia Type: general ASA Status: 5 - Emergent            Anesthesia Type: general    Vitals Value Taken Time   /50 03/21/24 1825   Temp 36 03/21/24 1825   Pulse 113 03/21/24 1825   Resp 24 03/21/24 1825   SpO2 94 03/21/24 1825       Anesthesia Post Evaluation    Patient location during evaluation: ICU  Patient participation: complete - patient cannot participate  Level of consciousness: sedated  Pain management: adequate  Airway patency: patent  Cardiovascular status: stable  Respiratory status: ETT, airway suctioned, intubated and ventilator  Hydration status: acceptable  Postoperative Nausea and Vomiting: none        No notable events documented.

## 2024-03-21 NOTE — PRE-SEDATION DOCUMENTATION
Sedation Plan    ASA 5 - emergent     Mallampati class: unable to assess.    Risks, benefits, and alternatives discussed with spouse.

## 2024-03-21 NOTE — PROGRESS NOTES
Physical Therapy                 Therapy Communication Note    Patient Name: Jason Hollins  MRN: [id number]  Today's Date: 3/21/2024     Discipline: Physical Therapy    Missed Visit Reason: Missed Visit Reason:  (Pt admitted to ICU 3/20 - unstable at this time, possible ECMO placement.  Will hold PT and re-attempt once medically stable.)    Missed Time: Attempt

## 2024-03-22 ENCOUNTER — APPOINTMENT (OUTPATIENT)
Dept: RADIOLOGY | Facility: HOSPITAL | Age: 64
DRG: 003 | End: 2024-03-22
Payer: COMMERCIAL

## 2024-03-22 ENCOUNTER — APPOINTMENT (OUTPATIENT)
Dept: CARDIOLOGY | Facility: HOSPITAL | Age: 64
DRG: 003 | End: 2024-03-22
Payer: COMMERCIAL

## 2024-03-22 LAB
ALBUMIN SERPL BCP-MCNC: 3 G/DL (ref 3.4–5)
ALBUMIN SERPL BCP-MCNC: 3.3 G/DL (ref 3.4–5)
ALBUMIN SERPL BCP-MCNC: 3.3 G/DL (ref 3.4–5)
ALP SERPL-CCNC: 68 U/L (ref 33–136)
ALP SERPL-CCNC: 69 U/L (ref 33–136)
ALT SERPL W P-5'-P-CCNC: 1618 U/L (ref 10–52)
ALT SERPL W P-5'-P-CCNC: 1633 U/L (ref 10–52)
AMYLASE SERPL-CCNC: 509 U/L (ref 29–103)
AMYLASE SERPL-CCNC: 600 U/L (ref 29–103)
AMYLASE SERPL-CCNC: 649 U/L (ref 29–103)
ANION GAP BLDA CALCULATED.4IONS-SCNC: 20 MMO/L (ref 10–25)
ANION GAP BLDA CALCULATED.4IONS-SCNC: 23 MMO/L (ref 10–25)
ANION GAP BLDA CALCULATED.4IONS-SCNC: 26 MMO/L (ref 10–25)
ANION GAP BLDA CALCULATED.4IONS-SCNC: 27 MMO/L (ref 10–25)
ANION GAP BLDA CALCULATED.4IONS-SCNC: 29 MMO/L (ref 10–25)
ANION GAP BLDA CALCULATED.4IONS-SCNC: 30 MMO/L (ref 10–25)
ANION GAP BLDA CALCULATED.4IONS-SCNC: 30 MMO/L (ref 10–25)
ANION GAP BLDA CALCULATED.4IONS-SCNC: 31 MMO/L (ref 10–25)
ANION GAP BLDA CALCULATED.4IONS-SCNC: 32 MMO/L (ref 10–25)
ANION GAP BLDV CALCULATED.4IONS-SCNC: 29 MMOL/L (ref 10–25)
ANION GAP SERPL CALC-SCNC: 27 MMOL/L (ref 10–20)
ANION GAP SERPL CALC-SCNC: 32 MMOL/L (ref 10–20)
ANION GAP SERPL CALC-SCNC: 33 MMOL/L (ref 10–20)
ANION GAP SERPL CALC-SCNC: 36 MMOL/L (ref 10–20)
APPEARANCE UR: ABNORMAL
APTT PPP: 35 SECONDS (ref 27–38)
APTT PPP: 36 SECONDS (ref 27–38)
APTT PPP: 37 SECONDS (ref 27–38)
AST SERPL W P-5'-P-CCNC: 3627 U/L (ref 9–39)
AST SERPL W P-5'-P-CCNC: 3786 U/L (ref 9–39)
BACTERIA #/AREA URNS AUTO: ABNORMAL /HPF
BASE EXCESS BLDA CALC-SCNC: -10.3 MMOL/L (ref -2–3)
BASE EXCESS BLDA CALC-SCNC: -11 MMOL/L (ref -2–3)
BASE EXCESS BLDA CALC-SCNC: -11.4 MMOL/L (ref -2–3)
BASE EXCESS BLDA CALC-SCNC: -11.7 MMOL/L (ref -2–3)
BASE EXCESS BLDA CALC-SCNC: -12.3 MMOL/L (ref -2–3)
BASE EXCESS BLDA CALC-SCNC: -12.8 MMOL/L (ref -2–3)
BASE EXCESS BLDA CALC-SCNC: -13.1 MMOL/L (ref -2–3)
BASE EXCESS BLDA CALC-SCNC: -13.4 MMOL/L (ref -2–3)
BASE EXCESS BLDA CALC-SCNC: -4.9 MMOL/L (ref -2–3)
BASE EXCESS BLDA CALC-SCNC: -7.3 MMOL/L (ref -2–3)
BASE EXCESS BLDA CALC-SCNC: -7.9 MMOL/L (ref -2–3)
BASE EXCESS BLDV CALC-SCNC: -12.7 MMOL/L (ref -2–3)
BASOPHILS # BLD MANUAL: 0 X10*3/UL (ref 0–0.1)
BASOPHILS NFR BLD MANUAL: 0 %
BILIRUB DIRECT SERPL-MCNC: 2.8 MG/DL (ref 0–0.3)
BILIRUB DIRECT SERPL-MCNC: 3 MG/DL (ref 0–0.3)
BILIRUB SERPL-MCNC: 4 MG/DL (ref 0–1.2)
BILIRUB SERPL-MCNC: 4.2 MG/DL (ref 0–1.2)
BILIRUB UR STRIP.AUTO-MCNC: ABNORMAL MG/DL
BLOOD EXPIRATION DATE: NORMAL
BODY SURFACE AREA: 2.52 M2
BODY TEMPERATURE: 37 DEGREES CELSIUS
BUN SERPL-MCNC: 29 MG/DL (ref 6–23)
BUN SERPL-MCNC: 30 MG/DL (ref 6–23)
BUN SERPL-MCNC: 33 MG/DL (ref 6–23)
BUN SERPL-MCNC: 34 MG/DL (ref 6–23)
BURR CELLS BLD QL SMEAR: ABNORMAL
CA-I BLD-SCNC: 1.03 MMOL/L (ref 1.1–1.33)
CA-I BLD-SCNC: 1.04 MMOL/L (ref 1.1–1.33)
CA-I BLD-SCNC: 1.06 MMOL/L (ref 1.1–1.33)
CA-I BLD-SCNC: 1.09 MMOL/L (ref 1.1–1.33)
CA-I BLDA-SCNC: 1.05 MMOL/L (ref 1.1–1.33)
CA-I BLDA-SCNC: 1.09 MMOL/L (ref 1.1–1.33)
CA-I BLDA-SCNC: 1.11 MMOL/L (ref 1.1–1.33)
CA-I BLDA-SCNC: 1.14 MMOL/L (ref 1.1–1.33)
CA-I BLDA-SCNC: 1.14 MMOL/L (ref 1.1–1.33)
CA-I BLDA-SCNC: 1.18 MMOL/L (ref 1.1–1.33)
CA-I BLDV-SCNC: 1.12 MMOL/L (ref 1.1–1.33)
CALCIUM SERPL-MCNC: 8.2 MG/DL (ref 8.6–10.6)
CALCIUM SERPL-MCNC: 8.3 MG/DL (ref 8.6–10.6)
CALCIUM SERPL-MCNC: 8.4 MG/DL (ref 8.6–10.6)
CALCIUM SERPL-MCNC: 9.1 MG/DL (ref 8.6–10.6)
CFT BLD TEG: 1.4 MIN (ref 0.8–2.1)
CHLORIDE BLDA-SCNC: 100 MMOL/L (ref 98–107)
CHLORIDE BLDA-SCNC: 101 MMOL/L (ref 98–107)
CHLORIDE BLDA-SCNC: 98 MMOL/L (ref 98–107)
CHLORIDE BLDA-SCNC: 99 MMOL/L (ref 98–107)
CHLORIDE BLDV-SCNC: 99 MMOL/L (ref 98–107)
CHLORIDE SERPL-SCNC: 97 MMOL/L (ref 98–107)
CHLORIDE SERPL-SCNC: 98 MMOL/L (ref 98–107)
CHLORIDE SERPL-SCNC: 98 MMOL/L (ref 98–107)
CHLORIDE SERPL-SCNC: 99 MMOL/L (ref 98–107)
CK SERPL-CCNC: 3619 U/L (ref 0–325)
CK SERPL-CCNC: 3922 U/L (ref 0–325)
CLOT ANGLE BLD TEG: 72 DEG (ref 63–78)
CLOT INIT BLD TEG: 8.8 MIN (ref 4.6–9.1)
CLOT INIT P HPASE BLD TEG: 7.7 MIN (ref 4.3–8.3)
CO2 SERPL-SCNC: 14 MMOL/L (ref 21–32)
CO2 SERPL-SCNC: 16 MMOL/L (ref 21–32)
CO2 SERPL-SCNC: 18 MMOL/L (ref 21–32)
CO2 SERPL-SCNC: 21 MMOL/L (ref 21–32)
COLOR UR: ABNORMAL
CREAT SERPL-MCNC: 3.01 MG/DL (ref 0.5–1.3)
CREAT SERPL-MCNC: 3.07 MG/DL (ref 0.5–1.3)
CREAT SERPL-MCNC: 3.14 MG/DL (ref 0.5–1.3)
CREAT SERPL-MCNC: 3.23 MG/DL (ref 0.5–1.3)
DISPENSE STATUS: NORMAL
EGFRCR SERPLBLD CKD-EPI 2021: 21 ML/MIN/1.73M*2
EGFRCR SERPLBLD CKD-EPI 2021: 22 ML/MIN/1.73M*2
EGFRCR SERPLBLD CKD-EPI 2021: 22 ML/MIN/1.73M*2
EGFRCR SERPLBLD CKD-EPI 2021: 23 ML/MIN/1.73M*2
EOSINOPHIL # BLD MANUAL: 0 X10*3/UL (ref 0–0.7)
EOSINOPHIL NFR BLD MANUAL: 0 %
ERYTHROCYTE [DISTWIDTH] IN BLOOD BY AUTOMATED COUNT: 14.6 % (ref 11.5–14.5)
ERYTHROCYTE [DISTWIDTH] IN BLOOD BY AUTOMATED COUNT: 15 % (ref 11.5–14.5)
ERYTHROCYTE [DISTWIDTH] IN BLOOD BY AUTOMATED COUNT: 15.2 % (ref 11.5–14.5)
ERYTHROCYTE [DISTWIDTH] IN BLOOD BY AUTOMATED COUNT: 15.2 % (ref 11.5–14.5)
ERYTHROCYTE [DISTWIDTH] IN BLOOD BY AUTOMATED COUNT: 15.6 % (ref 11.5–14.5)
FERRITIN SERPL-MCNC: 6300 NG/ML (ref 20–300)
FIBRINOGEN BLD CALC-MCNC: 409 MG/DL (ref 278–581)
FIBRINOGEN PPP-MCNC: 292 MG/DL (ref 200–400)
GLUCOSE BLD MANUAL STRIP-MCNC: 152 MG/DL (ref 74–99)
GLUCOSE BLD MANUAL STRIP-MCNC: 154 MG/DL (ref 74–99)
GLUCOSE BLD MANUAL STRIP-MCNC: 170 MG/DL (ref 74–99)
GLUCOSE BLD MANUAL STRIP-MCNC: 174 MG/DL (ref 74–99)
GLUCOSE BLD MANUAL STRIP-MCNC: 179 MG/DL (ref 74–99)
GLUCOSE BLD MANUAL STRIP-MCNC: 180 MG/DL (ref 74–99)
GLUCOSE BLD MANUAL STRIP-MCNC: 183 MG/DL (ref 74–99)
GLUCOSE BLD MANUAL STRIP-MCNC: 194 MG/DL (ref 74–99)
GLUCOSE BLD MANUAL STRIP-MCNC: 196 MG/DL (ref 74–99)
GLUCOSE BLDA-MCNC: 150 MG/DL (ref 74–99)
GLUCOSE BLDA-MCNC: 151 MG/DL (ref 74–99)
GLUCOSE BLDA-MCNC: 179 MG/DL (ref 74–99)
GLUCOSE BLDA-MCNC: 180 MG/DL (ref 74–99)
GLUCOSE BLDA-MCNC: 188 MG/DL (ref 74–99)
GLUCOSE BLDA-MCNC: 195 MG/DL (ref 74–99)
GLUCOSE BLDA-MCNC: 204 MG/DL (ref 74–99)
GLUCOSE BLDA-MCNC: 206 MG/DL (ref 74–99)
GLUCOSE BLDA-MCNC: 210 MG/DL (ref 74–99)
GLUCOSE BLDA-MCNC: 214 MG/DL (ref 74–99)
GLUCOSE BLDA-MCNC: 218 MG/DL (ref 74–99)
GLUCOSE BLDV-MCNC: 204 MG/DL (ref 74–99)
GLUCOSE SERPL-MCNC: 154 MG/DL (ref 74–99)
GLUCOSE SERPL-MCNC: 182 MG/DL (ref 74–99)
GLUCOSE SERPL-MCNC: 206 MG/DL (ref 74–99)
GLUCOSE SERPL-MCNC: 215 MG/DL (ref 74–99)
GLUCOSE UR STRIP.AUTO-MCNC: ABNORMAL MG/DL
HCO3 BLDA-SCNC: 13.5 MMOL/L (ref 22–26)
HCO3 BLDA-SCNC: 13.8 MMOL/L (ref 22–26)
HCO3 BLDA-SCNC: 14 MMOL/L (ref 22–26)
HCO3 BLDA-SCNC: 15 MMOL/L (ref 22–26)
HCO3 BLDA-SCNC: 15.1 MMOL/L (ref 22–26)
HCO3 BLDA-SCNC: 15.7 MMOL/L (ref 22–26)
HCO3 BLDA-SCNC: 16.3 MMOL/L (ref 22–26)
HCO3 BLDA-SCNC: 16.4 MMOL/L (ref 22–26)
HCO3 BLDA-SCNC: 18.9 MMOL/L (ref 22–26)
HCO3 BLDA-SCNC: 19.7 MMOL/L (ref 22–26)
HCO3 BLDA-SCNC: 20.6 MMOL/L (ref 22–26)
HCO3 BLDV-SCNC: 14.5 MMOL/L (ref 22–26)
HCT VFR BLD AUTO: 22.6 % (ref 41–52)
HCT VFR BLD AUTO: 22.6 % (ref 41–52)
HCT VFR BLD AUTO: 22.9 % (ref 41–52)
HCT VFR BLD AUTO: 25.4 % (ref 41–52)
HCT VFR BLD AUTO: 27.1 % (ref 41–52)
HCT VFR BLD EST: 24 % (ref 41–52)
HCT VFR BLD EST: 25 % (ref 41–52)
HCT VFR BLD EST: 25 % (ref 41–52)
HCT VFR BLD EST: 26 % (ref 41–52)
HCT VFR BLD EST: 27 % (ref 41–52)
HCT VFR BLD EST: 28 % (ref 41–52)
HCT VFR BLD EST: 29 % (ref 41–52)
HCT VFR BLD EST: 29 % (ref 41–52)
HGB BLD-MCNC: 8 G/DL (ref 13.5–17.5)
HGB BLD-MCNC: 8 G/DL (ref 13.5–17.5)
HGB BLD-MCNC: 8.1 G/DL (ref 13.5–17.5)
HGB BLD-MCNC: 9 G/DL (ref 13.5–17.5)
HGB BLD-MCNC: 9.6 G/DL (ref 13.5–17.5)
HGB BLDA-MCNC: 8 G/DL (ref 13.5–17.5)
HGB BLDA-MCNC: 8.4 G/DL (ref 13.5–17.5)
HGB BLDA-MCNC: 8.5 G/DL (ref 13.5–17.5)
HGB BLDA-MCNC: 8.9 G/DL (ref 13.5–17.5)
HGB BLDA-MCNC: 9 G/DL (ref 13.5–17.5)
HGB BLDA-MCNC: 9.1 G/DL (ref 13.5–17.5)
HGB BLDA-MCNC: 9.3 G/DL (ref 13.5–17.5)
HGB BLDA-MCNC: 9.6 G/DL (ref 13.5–17.5)
HGB BLDA-MCNC: 9.7 G/DL (ref 13.5–17.5)
HGB BLDV-MCNC: 8.4 G/DL (ref 13.5–17.5)
HOLD SPECIMEN: NORMAL
IMM GRANULOCYTES # BLD AUTO: 0.1 X10*3/UL (ref 0–0.7)
IMM GRANULOCYTES NFR BLD AUTO: 1.2 % (ref 0–0.9)
INHALED O2 CONCENTRATION: 100 %
INR PPP: 1.6 (ref 0.9–1.1)
INR PPP: 1.9 (ref 0.9–1.1)
INR PPP: 1.9 (ref 0.9–1.1)
KETONES UR STRIP.AUTO-MCNC: NEGATIVE MG/DL
LACTATE BLDA-SCNC: 10.3 MMOL/L (ref 0.4–2)
LACTATE BLDA-SCNC: 10.7 MMOL/L (ref 0.4–2)
LACTATE BLDA-SCNC: 11.6 MMOL/L (ref 0.4–2)
LACTATE BLDA-SCNC: 13 MMOL/L (ref 0.4–2)
LACTATE BLDA-SCNC: 14.5 MMOL/L (ref 0.4–2)
LACTATE BLDA-SCNC: 14.6 MMOL/L (ref 0.4–2)
LACTATE BLDA-SCNC: 14.7 MMOL/L (ref 0.4–2)
LACTATE BLDA-SCNC: 15.1 MMOL/L (ref 0.4–2)
LACTATE BLDA-SCNC: 15.4 MMOL/L (ref 0.4–2)
LACTATE BLDA-SCNC: 16 MMOL/L (ref 0.4–2)
LACTATE BLDA-SCNC: 9.5 MMOL/L (ref 0.4–2)
LACTATE BLDV-SCNC: 15 MMOL/L (ref 0.4–2)
LACTATE BLDV-SCNC: 15 MMOL/L (ref 0.4–2)
LEUKOCYTE ESTERASE UR QL STRIP.AUTO: ABNORMAL
LIPASE SERPL-CCNC: 1188 U/L (ref 9–82)
LIPASE SERPL-CCNC: 1348 U/L (ref 9–82)
LIPASE SERPL-CCNC: 531 U/L (ref 9–82)
LYMPHOCYTES # BLD MANUAL: 1.32 X10*3/UL (ref 1.2–4.8)
LYMPHOCYTES NFR BLD MANUAL: 15.5 %
MAGNESIUM SERPL-MCNC: 1.92 MG/DL (ref 1.6–2.4)
MAGNESIUM SERPL-MCNC: 2.03 MG/DL (ref 1.6–2.4)
MAGNESIUM SERPL-MCNC: 2.09 MG/DL (ref 1.6–2.4)
MAGNESIUM SERPL-MCNC: 2.15 MG/DL (ref 1.6–2.4)
MCF BLD TEG: 22 MM (ref 15–32)
MCF BLD TEG: 62 MM (ref 52–70)
MCF BLD TEG: 63 MM (ref 52–69)
MCH RBC QN AUTO: 29.7 PG (ref 26–34)
MCH RBC QN AUTO: 29.8 PG (ref 26–34)
MCHC RBC AUTO-ENTMCNC: 35.4 G/DL (ref 32–36)
MCV RBC AUTO: 84 FL (ref 80–100)
MONOCYTES # BLD MANUAL: 0.31 X10*3/UL (ref 0.1–1)
MONOCYTES NFR BLD MANUAL: 3.6 %
NEUTROPHILS # BLD MANUAL: 6.88 X10*3/UL (ref 1.2–7.7)
NEUTS BAND # BLD MANUAL: 1.08 X10*3/UL (ref 0–0.7)
NEUTS BAND NFR BLD MANUAL: 12.7 %
NEUTS SEG # BLD MANUAL: 5.8 X10*3/UL (ref 1.2–7)
NEUTS SEG NFR BLD MANUAL: 68.2 %
NITRITE UR QL STRIP.AUTO: NEGATIVE
NRBC BLD-RTO: 1.3 /100 WBCS (ref 0–0)
NRBC BLD-RTO: 1.6 /100 WBCS (ref 0–0)
NRBC BLD-RTO: 1.8 /100 WBCS (ref 0–0)
NRBC BLD-RTO: 1.8 /100 WBCS (ref 0–0)
NRBC BLD-RTO: 2.8 /100 WBCS (ref 0–0)
OXYHGB MFR BLDA: 91.7 % (ref 94–98)
OXYHGB MFR BLDA: 92.7 % (ref 94–98)
OXYHGB MFR BLDA: 92.8 % (ref 94–98)
OXYHGB MFR BLDA: 94.4 % (ref 94–98)
OXYHGB MFR BLDA: 94.9 % (ref 94–98)
OXYHGB MFR BLDA: 95.2 % (ref 94–98)
OXYHGB MFR BLDA: 95.9 % (ref 94–98)
OXYHGB MFR BLDA: 96 % (ref 94–98)
OXYHGB MFR BLDA: 96 % (ref 94–98)
OXYHGB MFR BLDA: 97.1 % (ref 94–98)
OXYHGB MFR BLDA: 97.5 % (ref 94–98)
OXYHGB MFR BLDV: 81.6 % (ref 45–75)
PCO2 BLDA: 33 MM HG (ref 38–42)
PCO2 BLDA: 35 MM HG (ref 38–42)
PCO2 BLDA: 35 MM HG (ref 38–42)
PCO2 BLDA: 36 MM HG (ref 38–42)
PCO2 BLDA: 37 MM HG (ref 38–42)
PCO2 BLDA: 38 MM HG (ref 38–42)
PCO2 BLDA: 39 MM HG (ref 38–42)
PCO2 BLDA: 42 MM HG (ref 38–42)
PCO2 BLDA: 43 MM HG (ref 38–42)
PCO2 BLDA: 44 MM HG (ref 38–42)
PCO2 BLDA: 46 MM HG (ref 38–42)
PCO2 BLDV: 38 MM HG (ref 41–51)
PH BLDA: 7.16 PH (ref 7.38–7.42)
PH BLDA: 7.19 PH (ref 7.38–7.42)
PH BLDA: 7.2 PH (ref 7.38–7.42)
PH BLDA: 7.21 PH (ref 7.38–7.42)
PH BLDA: 7.22 PH (ref 7.38–7.42)
PH BLDA: 7.22 PH (ref 7.38–7.42)
PH BLDA: 7.24 PH (ref 7.38–7.42)
PH BLDA: 7.25 PH (ref 7.38–7.42)
PH BLDA: 7.33 PH (ref 7.38–7.42)
PH BLDV: 7.19 PH (ref 7.33–7.43)
PH UR STRIP.AUTO: 7.5 [PH]
PHOSPHATE SERPL-MCNC: 6.3 MG/DL (ref 2.5–4.9)
PHOSPHATE SERPL-MCNC: 6.5 MG/DL (ref 2.5–4.9)
PHOSPHATE SERPL-MCNC: 8.2 MG/DL (ref 2.5–4.9)
PHOSPHATE SERPL-MCNC: 9.8 MG/DL (ref 2.5–4.9)
PLATELET # BLD AUTO: 78 X10*3/UL (ref 150–450)
PLATELET # BLD AUTO: 82 X10*3/UL (ref 150–450)
PLATELET # BLD AUTO: 82 X10*3/UL (ref 150–450)
PLATELET # BLD AUTO: 90 X10*3/UL (ref 150–450)
PLATELET # BLD AUTO: 92 X10*3/UL (ref 150–450)
PO2 BLDA: 101 MM HG (ref 85–95)
PO2 BLDA: 64 MM HG (ref 85–95)
PO2 BLDA: 68 MM HG (ref 85–95)
PO2 BLDA: 70 MM HG (ref 85–95)
PO2 BLDA: 75 MM HG (ref 85–95)
PO2 BLDA: 78 MM HG (ref 85–95)
PO2 BLDA: 90 MM HG (ref 85–95)
PO2 BLDA: 94 MM HG (ref 85–95)
PO2 BLDA: 95 MM HG (ref 85–95)
PO2 BLDA: 96 MM HG (ref 85–95)
PO2 BLDA: 99 MM HG (ref 85–95)
PO2 BLDV: 54 MM HG (ref 35–45)
POTASSIUM BLDA-SCNC: 4.2 MMOL/L (ref 3.5–5.3)
POTASSIUM BLDA-SCNC: 4.5 MMOL/L (ref 3.5–5.3)
POTASSIUM BLDA-SCNC: 4.5 MMOL/L (ref 3.5–5.3)
POTASSIUM BLDA-SCNC: 4.8 MMOL/L (ref 3.5–5.3)
POTASSIUM BLDA-SCNC: 5 MMOL/L (ref 3.5–5.3)
POTASSIUM BLDA-SCNC: 5 MMOL/L (ref 3.5–5.3)
POTASSIUM BLDA-SCNC: 5.1 MMOL/L (ref 3.5–5.3)
POTASSIUM BLDA-SCNC: 5.2 MMOL/L (ref 3.5–5.3)
POTASSIUM BLDA-SCNC: 5.5 MMOL/L (ref 3.5–5.3)
POTASSIUM BLDV-SCNC: 5.1 MMOL/L (ref 3.5–5.3)
POTASSIUM SERPL-SCNC: 4.5 MMOL/L (ref 3.5–5.3)
POTASSIUM SERPL-SCNC: 4.7 MMOL/L (ref 3.5–5.3)
POTASSIUM SERPL-SCNC: 5 MMOL/L (ref 3.5–5.3)
POTASSIUM SERPL-SCNC: 5.2 MMOL/L (ref 3.5–5.3)
PRODUCT BLOOD TYPE: 2800
PRODUCT BLOOD TYPE: 5100
PRODUCT BLOOD TYPE: 600
PRODUCT BLOOD TYPE: 6200
PRODUCT BLOOD TYPE: 8400
PRODUCT BLOOD TYPE: 9500
PRODUCT CODE: NORMAL
PROT SERPL-MCNC: 4.4 G/DL (ref 6.4–8.2)
PROT SERPL-MCNC: 4.9 G/DL (ref 6.4–8.2)
PROT UR STRIP.AUTO-MCNC: ABNORMAL MG/DL
PROTHROMBIN TIME: 18.4 SECONDS (ref 9.8–12.8)
PROTHROMBIN TIME: 21.2 SECONDS (ref 9.8–12.8)
PROTHROMBIN TIME: 21.4 SECONDS (ref 9.8–12.8)
RBC # BLD AUTO: 2.69 X10*6/UL (ref 4.5–5.9)
RBC # BLD AUTO: 2.69 X10*6/UL (ref 4.5–5.9)
RBC # BLD AUTO: 2.73 X10*6/UL (ref 4.5–5.9)
RBC # BLD AUTO: 3.02 X10*6/UL (ref 4.5–5.9)
RBC # BLD AUTO: 3.23 X10*6/UL (ref 4.5–5.9)
RBC # UR STRIP.AUTO: ABNORMAL /UL
RBC #/AREA URNS AUTO: >20 /HPF
RBC MORPH BLD: ABNORMAL
SAO2 % BLDA: 100 % (ref 94–100)
SAO2 % BLDA: 94 % (ref 94–100)
SAO2 % BLDA: 95 % (ref 94–100)
SAO2 % BLDA: 95 % (ref 94–100)
SAO2 % BLDA: 97 % (ref 94–100)
SAO2 % BLDA: 98 % (ref 94–100)
SAO2 % BLDA: 98 % (ref 94–100)
SAO2 % BLDA: 99 % (ref 94–100)
SAO2 % BLDA: 99 % (ref 94–100)
SAO2 % BLDV: 84 % (ref 45–75)
SODIUM BLDA-SCNC: 135 MMOL/L (ref 136–145)
SODIUM BLDA-SCNC: 136 MMOL/L (ref 136–145)
SODIUM BLDA-SCNC: 136 MMOL/L (ref 136–145)
SODIUM BLDA-SCNC: 137 MMOL/L (ref 136–145)
SODIUM BLDA-SCNC: 138 MMOL/L (ref 136–145)
SODIUM BLDA-SCNC: 138 MMOL/L (ref 136–145)
SODIUM BLDA-SCNC: 139 MMOL/L (ref 136–145)
SODIUM BLDA-SCNC: 139 MMOL/L (ref 136–145)
SODIUM BLDA-SCNC: 140 MMOL/L (ref 136–145)
SODIUM BLDA-SCNC: 141 MMOL/L (ref 136–145)
SODIUM BLDA-SCNC: 141 MMOL/L (ref 136–145)
SODIUM BLDV-SCNC: 137 MMOL/L (ref 136–145)
SODIUM SERPL-SCNC: 140 MMOL/L (ref 136–145)
SODIUM SERPL-SCNC: 142 MMOL/L (ref 136–145)
SODIUM SERPL-SCNC: 143 MMOL/L (ref 136–145)
SODIUM SERPL-SCNC: 144 MMOL/L (ref 136–145)
SP GR UR STRIP.AUTO: 1.02
TOTAL CELLS COUNTED BLD: 110
TRICUSPID ANNULAR PLANE SYSTOLIC EXCURSION: 12.7 CM
TRIGL SERPL-MCNC: 595 MG/DL (ref 0–149)
UNIT ABO: NORMAL
UNIT NUMBER: NORMAL
UNIT RH: NORMAL
UNIT VOLUME: 201
UNIT VOLUME: 205
UNIT VOLUME: 208
UNIT VOLUME: 218
UNIT VOLUME: 223
UNIT VOLUME: 226
UNIT VOLUME: 254
UNIT VOLUME: 256
UNIT VOLUME: 264
UNIT VOLUME: 270
UNIT VOLUME: 276
UNIT VOLUME: 278
UNIT VOLUME: 279
UNIT VOLUME: 289
UNIT VOLUME: 292
UNIT VOLUME: 299
UNIT VOLUME: 313
UNIT VOLUME: 315
UNIT VOLUME: 317
UNIT VOLUME: 318
UNIT VOLUME: 322
UNIT VOLUME: 326
UNIT VOLUME: 328
UNIT VOLUME: 328
UNIT VOLUME: 332
UNIT VOLUME: 333
UNIT VOLUME: 341
UNIT VOLUME: 350
UNIT VOLUME: 351
UNIT VOLUME: 65
UNIT VOLUME: 65
UNIT VOLUME: 75
UNIT VOLUME: 85
UNIT VOLUME: 85
UROBILINOGEN UR STRIP.AUTO-MCNC: ABNORMAL MG/DL
WBC # BLD AUTO: 7.6 X10*3/UL (ref 4.4–11.3)
WBC # BLD AUTO: 8.5 X10*3/UL (ref 4.4–11.3)
WBC # BLD AUTO: 8.5 X10*3/UL (ref 4.4–11.3)
WBC # BLD AUTO: 8.8 X10*3/UL (ref 4.4–11.3)
WBC # BLD AUTO: 9 X10*3/UL (ref 4.4–11.3)
WBC #/AREA URNS AUTO: ABNORMAL /HPF
XM INTEP: NORMAL

## 2024-03-22 PROCEDURE — 93325 DOPPLER ECHO COLOR FLOW MAPG: CPT | Performed by: INTERNAL MEDICINE

## 2024-03-22 PROCEDURE — 36415 COLL VENOUS BLD VENIPUNCTURE: CPT | Performed by: NURSE PRACTITIONER

## 2024-03-22 PROCEDURE — 2500000004 HC RX 250 GENERAL PHARMACY W/ HCPCS (ALT 636 FOR OP/ED): Performed by: STUDENT IN AN ORGANIZED HEALTH CARE EDUCATION/TRAINING PROGRAM

## 2024-03-22 PROCEDURE — 85384 FIBRINOGEN ACTIVITY: CPT | Performed by: STUDENT IN AN ORGANIZED HEALTH CARE EDUCATION/TRAINING PROGRAM

## 2024-03-22 PROCEDURE — 87205 SMEAR GRAM STAIN: CPT | Performed by: NURSE PRACTITIONER

## 2024-03-22 PROCEDURE — 72170 X-RAY EXAM OF PELVIS: CPT

## 2024-03-22 PROCEDURE — 86352 CELL FUNCTION ASSAY W/STIM: CPT | Performed by: STUDENT IN AN ORGANIZED HEALTH CARE EDUCATION/TRAINING PROGRAM

## 2024-03-22 PROCEDURE — 83690 ASSAY OF LIPASE: CPT | Performed by: STUDENT IN AN ORGANIZED HEALTH CARE EDUCATION/TRAINING PROGRAM

## 2024-03-22 PROCEDURE — 36556 INSERT NON-TUNNEL CV CATH: CPT | Performed by: STUDENT IN AN ORGANIZED HEALTH CARE EDUCATION/TRAINING PROGRAM

## 2024-03-22 PROCEDURE — 99233 SBSQ HOSP IP/OBS HIGH 50: CPT | Performed by: INTERNAL MEDICINE

## 2024-03-22 PROCEDURE — 82330 ASSAY OF CALCIUM: CPT

## 2024-03-22 PROCEDURE — 84075 ASSAY ALKALINE PHOSPHATASE: CPT | Performed by: STUDENT IN AN ORGANIZED HEALTH CARE EDUCATION/TRAINING PROGRAM

## 2024-03-22 PROCEDURE — 70450 CT HEAD/BRAIN W/O DYE: CPT | Performed by: RADIOLOGY

## 2024-03-22 PROCEDURE — 99233 SBSQ HOSP IP/OBS HIGH 50: CPT | Performed by: SURGERY

## 2024-03-22 PROCEDURE — 2500000005 HC RX 250 GENERAL PHARMACY W/O HCPCS: Performed by: NURSE PRACTITIONER

## 2024-03-22 PROCEDURE — 93005 ELECTROCARDIOGRAM TRACING: CPT

## 2024-03-22 PROCEDURE — 37799 UNLISTED PX VASCULAR SURGERY: CPT | Performed by: NURSE PRACTITIONER

## 2024-03-22 PROCEDURE — 74177 CT ABD & PELVIS W/CONTRAST: CPT | Performed by: RADIOLOGY

## 2024-03-22 PROCEDURE — 71045 X-RAY EXAM CHEST 1 VIEW: CPT | Performed by: RADIOLOGY

## 2024-03-22 PROCEDURE — 2500000004 HC RX 250 GENERAL PHARMACY W/ HCPCS (ALT 636 FOR OP/ED): Performed by: NURSE PRACTITIONER

## 2024-03-22 PROCEDURE — 74177 CT ABD & PELVIS W/CONTRAST: CPT

## 2024-03-22 PROCEDURE — 76700 US EXAM ABDOM COMPLETE: CPT | Performed by: RADIOLOGY

## 2024-03-22 PROCEDURE — 36430 TRANSFUSION BLD/BLD COMPNT: CPT

## 2024-03-22 PROCEDURE — P9012 CRYOPRECIPITATE EACH UNIT: HCPCS

## 2024-03-22 PROCEDURE — 86705 HEP B CORE ANTIBODY IGM: CPT | Performed by: INTERNAL MEDICINE

## 2024-03-22 PROCEDURE — 85027 COMPLETE CBC AUTOMATED: CPT | Performed by: NURSE PRACTITIONER

## 2024-03-22 PROCEDURE — 93010 ELECTROCARDIOGRAM REPORT: CPT | Performed by: INTERNAL MEDICINE

## 2024-03-22 PROCEDURE — 99223 1ST HOSP IP/OBS HIGH 75: CPT

## 2024-03-22 PROCEDURE — 06HY33Z INSERTION OF INFUSION DEVICE INTO LOWER VEIN, PERCUTANEOUS APPROACH: ICD-10-PCS | Performed by: ANESTHESIOLOGY

## 2024-03-22 PROCEDURE — 2500000004 HC RX 250 GENERAL PHARMACY W/ HCPCS (ALT 636 FOR OP/ED)

## 2024-03-22 PROCEDURE — 81001 URINALYSIS AUTO W/SCOPE: CPT | Performed by: NURSE PRACTITIONER

## 2024-03-22 PROCEDURE — 82150 ASSAY OF AMYLASE: CPT | Performed by: STUDENT IN AN ORGANIZED HEALTH CARE EDUCATION/TRAINING PROGRAM

## 2024-03-22 PROCEDURE — 85610 PROTHROMBIN TIME: CPT | Performed by: NURSE PRACTITIONER

## 2024-03-22 PROCEDURE — 90937 HEMODIALYSIS REPEATED EVAL: CPT

## 2024-03-22 PROCEDURE — 99292 CRITICAL CARE ADDL 30 MIN: CPT | Performed by: STUDENT IN AN ORGANIZED HEALTH CARE EDUCATION/TRAINING PROGRAM

## 2024-03-22 PROCEDURE — 82150 ASSAY OF AMYLASE: CPT | Performed by: NURSE PRACTITIONER

## 2024-03-22 PROCEDURE — 2020000001 HC ICU ROOM DAILY

## 2024-03-22 PROCEDURE — 93308 TTE F-UP OR LMTD: CPT | Performed by: INTERNAL MEDICINE

## 2024-03-22 PROCEDURE — 71045 X-RAY EXAM CHEST 1 VIEW: CPT

## 2024-03-22 PROCEDURE — 2500000002 HC RX 250 W HCPCS SELF ADMINISTERED DRUGS (ALT 637 FOR MEDICARE OP, ALT 636 FOR OP/ED): Performed by: STUDENT IN AN ORGANIZED HEALTH CARE EDUCATION/TRAINING PROGRAM

## 2024-03-22 PROCEDURE — 85007 BL SMEAR W/DIFF WBC COUNT: CPT | Performed by: NURSE PRACTITIONER

## 2024-03-22 PROCEDURE — 87340 HEPATITIS B SURFACE AG IA: CPT | Performed by: INTERNAL MEDICINE

## 2024-03-22 PROCEDURE — 83735 ASSAY OF MAGNESIUM: CPT | Performed by: NURSE PRACTITIONER

## 2024-03-22 PROCEDURE — 84132 ASSAY OF SERUM POTASSIUM: CPT | Performed by: NURSE PRACTITIONER

## 2024-03-22 PROCEDURE — P9045 ALBUMIN (HUMAN), 5%, 250 ML: HCPCS | Mod: JZ

## 2024-03-22 PROCEDURE — 94645 CONT INHLJ TX EACH ADDL HOUR: CPT

## 2024-03-22 PROCEDURE — 82947 ASSAY GLUCOSE BLOOD QUANT: CPT

## 2024-03-22 PROCEDURE — 94003 VENT MGMT INPAT SUBQ DAY: CPT

## 2024-03-22 PROCEDURE — 2550000001 HC RX 255 CONTRASTS

## 2024-03-22 PROCEDURE — 70450 CT HEAD/BRAIN W/O DYE: CPT

## 2024-03-22 PROCEDURE — 99232 SBSQ HOSP IP/OBS MODERATE 35: CPT | Performed by: SURGERY

## 2024-03-22 PROCEDURE — 84478 ASSAY OF TRIGLYCERIDES: CPT | Performed by: STUDENT IN AN ORGANIZED HEALTH CARE EDUCATION/TRAINING PROGRAM

## 2024-03-22 PROCEDURE — 2500000004 HC RX 250 GENERAL PHARMACY W/ HCPCS (ALT 636 FOR OP/ED): Mod: JZ

## 2024-03-22 PROCEDURE — 71260 CT THORAX DX C+: CPT | Performed by: RADIOLOGY

## 2024-03-22 PROCEDURE — 83690 ASSAY OF LIPASE: CPT | Performed by: NURSE PRACTITIONER

## 2024-03-22 PROCEDURE — 87040 BLOOD CULTURE FOR BACTERIA: CPT | Performed by: NURSE PRACTITIONER

## 2024-03-22 PROCEDURE — 82728 ASSAY OF FERRITIN: CPT | Performed by: NURSE PRACTITIONER

## 2024-03-22 PROCEDURE — 84075 ASSAY ALKALINE PHOSPHATASE: CPT | Performed by: NURSE PRACTITIONER

## 2024-03-22 PROCEDURE — 82550 ASSAY OF CK (CPK): CPT | Performed by: NURSE PRACTITIONER

## 2024-03-22 PROCEDURE — 94799 UNLISTED PULMONARY SVC/PX: CPT

## 2024-03-22 PROCEDURE — 83605 ASSAY OF LACTIC ACID: CPT | Performed by: NURSE PRACTITIONER

## 2024-03-22 PROCEDURE — 93325 DOPPLER ECHO COLOR FLOW MAPG: CPT

## 2024-03-22 PROCEDURE — 2500000005 HC RX 250 GENERAL PHARMACY W/O HCPCS: Performed by: STUDENT IN AN ORGANIZED HEALTH CARE EDUCATION/TRAINING PROGRAM

## 2024-03-22 PROCEDURE — 86803 HEPATITIS C AB TEST: CPT | Performed by: INTERNAL MEDICINE

## 2024-03-22 PROCEDURE — 86706 HEP B SURFACE ANTIBODY: CPT | Performed by: INTERNAL MEDICINE

## 2024-03-22 PROCEDURE — 83010 ASSAY OF HAPTOGLOBIN QUANT: CPT | Mod: WESLAB | Performed by: INTERNAL MEDICINE

## 2024-03-22 PROCEDURE — 99291 CRITICAL CARE FIRST HOUR: CPT | Performed by: STUDENT IN AN ORGANIZED HEALTH CARE EDUCATION/TRAINING PROGRAM

## 2024-03-22 PROCEDURE — 72170 X-RAY EXAM OF PELVIS: CPT | Performed by: INTERNAL MEDICINE

## 2024-03-22 PROCEDURE — 87086 URINE CULTURE/COLONY COUNT: CPT | Performed by: NURSE PRACTITIONER

## 2024-03-22 PROCEDURE — 99233 SBSQ HOSP IP/OBS HIGH 50: CPT | Performed by: NURSE PRACTITIONER

## 2024-03-22 PROCEDURE — 93321 DOPPLER ECHO F-UP/LMTD STD: CPT | Performed by: INTERNAL MEDICINE

## 2024-03-22 PROCEDURE — 85384 FIBRINOGEN ACTIVITY: CPT | Performed by: NURSE PRACTITIONER

## 2024-03-22 PROCEDURE — 2500000005 HC RX 250 GENERAL PHARMACY W/O HCPCS

## 2024-03-22 PROCEDURE — 2500000001 HC RX 250 WO HCPCS SELF ADMINISTERED DRUGS (ALT 637 FOR MEDICARE OP): Performed by: NURSE PRACTITIONER

## 2024-03-22 PROCEDURE — C9113 INJ PANTOPRAZOLE SODIUM, VIA: HCPCS | Performed by: NURSE PRACTITIONER

## 2024-03-22 PROCEDURE — 82330 ASSAY OF CALCIUM: CPT | Performed by: NURSE PRACTITIONER

## 2024-03-22 PROCEDURE — 76700 US EXAM ABDOM COMPLETE: CPT

## 2024-03-22 RX ORDER — ROCURONIUM BROMIDE 10 MG/ML
50 INJECTION, SOLUTION INTRAVENOUS ONCE
Status: COMPLETED | OUTPATIENT
Start: 2024-03-22 | End: 2024-03-22

## 2024-03-22 RX ORDER — INDOMETHACIN 25 MG/1
CAPSULE ORAL
Status: COMPLETED
Start: 2024-03-22 | End: 2024-03-22

## 2024-03-22 RX ORDER — PROPOFOL 10 MG/ML
5-20 INJECTION, EMULSION INTRAVENOUS CONTINUOUS
Status: DISCONTINUED | OUTPATIENT
Start: 2024-03-22 | End: 2024-03-22

## 2024-03-22 RX ORDER — PROPOFOL 10 MG/ML
5-50 INJECTION, EMULSION INTRAVENOUS CONTINUOUS
Status: DISCONTINUED | OUTPATIENT
Start: 2024-03-22 | End: 2024-03-23

## 2024-03-22 RX ORDER — MAGNESIUM SULFATE HEPTAHYDRATE 40 MG/ML
2 INJECTION, SOLUTION INTRAVENOUS ONCE
Status: DISCONTINUED | OUTPATIENT
Start: 2024-03-22 | End: 2024-03-25

## 2024-03-22 RX ORDER — VANCOMYCIN HYDROCHLORIDE 1 G/200ML
1000 INJECTION, SOLUTION INTRAVENOUS ONCE
Status: COMPLETED | OUTPATIENT
Start: 2024-03-22 | End: 2024-03-22

## 2024-03-22 RX ORDER — SODIUM CHLORIDE, SODIUM LACTATE, POTASSIUM CHLORIDE, CALCIUM CHLORIDE 600; 310; 30; 20 MG/100ML; MG/100ML; MG/100ML; MG/100ML
5 INJECTION, SOLUTION INTRAVENOUS CONTINUOUS
Status: DISCONTINUED | OUTPATIENT
Start: 2024-03-22 | End: 2024-03-26

## 2024-03-22 RX ORDER — SODIUM CHLORIDE, SODIUM LACTATE, POTASSIUM CHLORIDE, CALCIUM CHLORIDE 600; 310; 30; 20 MG/100ML; MG/100ML; MG/100ML; MG/100ML
5 INJECTION, SOLUTION INTRAVENOUS CONTINUOUS
Status: DISCONTINUED | OUTPATIENT
Start: 2024-03-22 | End: 2024-04-05

## 2024-03-22 RX ORDER — EPINEPHRINE HCL IN DEXTROSE 5% 4MG/250ML
0-2 PLASTIC BAG, INJECTION (ML) INTRAVENOUS CONTINUOUS
Status: DISCONTINUED | OUTPATIENT
Start: 2024-03-22 | End: 2024-03-22

## 2024-03-22 RX ORDER — DEXTROSE 50 % IN WATER (D50W) INTRAVENOUS SYRINGE
25 ONCE
Status: COMPLETED | OUTPATIENT
Start: 2024-03-22 | End: 2024-03-22

## 2024-03-22 RX ORDER — VANCOMYCIN HYDROCHLORIDE 1 G/20ML
INJECTION, POWDER, LYOPHILIZED, FOR SOLUTION INTRAVENOUS DAILY PRN
Status: DISCONTINUED | OUTPATIENT
Start: 2024-03-22 | End: 2024-03-27

## 2024-03-22 RX ORDER — NOREPINEPHRINE BITARTRATE/D5W 8 MG/250ML
.01-3.3 PLASTIC BAG, INJECTION (ML) INTRAVENOUS CONTINUOUS
Status: DISCONTINUED | OUTPATIENT
Start: 2024-03-22 | End: 2024-03-22

## 2024-03-22 RX ORDER — VANCOMYCIN HYDROCHLORIDE 750 MG/150ML
750 INJECTION, SOLUTION INTRAVENOUS EVERY 12 HOURS
Status: DISCONTINUED | OUTPATIENT
Start: 2024-03-22 | End: 2024-03-23

## 2024-03-22 RX ORDER — DEXTROSE MONOHYDRATE 100 MG/ML
50 INJECTION, SOLUTION INTRAVENOUS CONTINUOUS
Status: DISCONTINUED | OUTPATIENT
Start: 2024-03-22 | End: 2024-03-22

## 2024-03-22 RX ORDER — ALBUMIN HUMAN 50 G/1000ML
SOLUTION INTRAVENOUS
Status: COMPLETED
Start: 2024-03-22 | End: 2024-03-22

## 2024-03-22 RX ORDER — NOREPINEPHRINE BITARTRATE/D5W 8 MG/250ML
.1-3 PLASTIC BAG, INJECTION (ML) INTRAVENOUS CONTINUOUS
Status: DISCONTINUED | OUTPATIENT
Start: 2024-03-22 | End: 2024-03-22

## 2024-03-22 RX ORDER — ROCURONIUM BROMIDE 10 MG/ML
INJECTION, SOLUTION INTRAVENOUS
Status: COMPLETED
Start: 2024-03-22 | End: 2024-03-22

## 2024-03-22 RX ORDER — MUPIROCIN 20 MG/G
OINTMENT TOPICAL 3 TIMES DAILY
Status: DISCONTINUED | OUTPATIENT
Start: 2024-03-22 | End: 2024-03-23

## 2024-03-22 RX ORDER — MUPIROCIN 20 MG/G
OINTMENT TOPICAL 3 TIMES DAILY
Status: DISCONTINUED | OUTPATIENT
Start: 2024-03-22 | End: 2024-03-22

## 2024-03-22 RX ORDER — INDOMETHACIN 25 MG/1
50 CAPSULE ORAL ONCE
Status: COMPLETED | OUTPATIENT
Start: 2024-03-22 | End: 2024-03-22

## 2024-03-22 RX ORDER — ALBUMIN HUMAN 50 G/1000ML
25 SOLUTION INTRAVENOUS ONCE
Status: COMPLETED | OUTPATIENT
Start: 2024-03-22 | End: 2024-03-22

## 2024-03-22 RX ADMIN — SODIUM BICARBONATE 50 MEQ: 84 INJECTION, SOLUTION INTRAVENOUS at 01:45

## 2024-03-22 RX ADMIN — HYDROCORTISONE SODIUM SUCCINATE 50 MG: 100 INJECTION, POWDER, FOR SOLUTION INTRAMUSCULAR; INTRAVENOUS at 20:04

## 2024-03-22 RX ADMIN — PIPERACILLIN SODIUM AND TAZOBACTAM SODIUM 3.38 G: 3; .375 INJECTION, SOLUTION INTRAVENOUS at 10:37

## 2024-03-22 RX ADMIN — AMIODARONE HYDROCHLORIDE 1 MG/MIN: 1.8 INJECTION, SOLUTION INTRAVENOUS at 21:50

## 2024-03-22 RX ADMIN — PERFLUTREN 10 ML OF DILUTION: 6.52 INJECTION, SUSPENSION INTRAVENOUS at 09:30

## 2024-03-22 RX ADMIN — CALCIUM GLUCONATE 1 G: 20 INJECTION, SOLUTION INTRAVENOUS at 02:02

## 2024-03-22 RX ADMIN — SODIUM BICARBONATE 125 ML/HR: 84 INJECTION, SOLUTION INTRAVENOUS at 02:53

## 2024-03-22 RX ADMIN — PANTOPRAZOLE SODIUM 40 MG: 40 INJECTION, POWDER, FOR SOLUTION INTRAVENOUS at 09:43

## 2024-03-22 RX ADMIN — VASOPRESSIN 0.06 UNITS/MIN: 0.2 INJECTION INTRAVENOUS at 01:23

## 2024-03-22 RX ADMIN — PIPERACILLIN SODIUM AND TAZOBACTAM SODIUM 3.38 G: 3; .375 INJECTION, SOLUTION INTRAVENOUS at 05:06

## 2024-03-22 RX ADMIN — MICAFUNGIN SODIUM 100 MG: 100 INJECTION, POWDER, LYOPHILIZED, FOR SOLUTION INTRAVENOUS at 19:43

## 2024-03-22 RX ADMIN — AMIODARONE HYDROCHLORIDE 1 MG/MIN: 1.8 INJECTION, SOLUTION INTRAVENOUS at 21:28

## 2024-03-22 RX ADMIN — VASOPRESSIN 0.06 UNITS/MIN: 0.2 INJECTION INTRAVENOUS at 23:46

## 2024-03-22 RX ADMIN — INSULIN HUMAN 10 UNITS: 100 INJECTION, SOLUTION PARENTERAL at 01:24

## 2024-03-22 RX ADMIN — VASOPRESSIN 0.06 UNITS/MIN: 0.2 INJECTION INTRAVENOUS at 06:13

## 2024-03-22 RX ADMIN — HYDROXOCOBALAMIN 5000 MG: 5 INJECTION, POWDER, LYOPHILIZED, FOR SOLUTION INTRAVENOUS at 11:16

## 2024-03-22 RX ADMIN — PROPOFOL 50 MCG/KG/MIN: 10 INJECTION, EMULSION INTRAVENOUS at 15:05

## 2024-03-22 RX ADMIN — CALCIUM CHLORIDE 1 G: 100 INJECTION, SOLUTION INTRAVENOUS at 07:30

## 2024-03-22 RX ADMIN — CISATRACURIUM BESYLATE 0.5 MCG/KG/MIN: 200 INJECTION, SOLUTION INTRAVENOUS at 18:26

## 2024-03-22 RX ADMIN — MAGNESIUM SULFATE HEPTAHYDRATE 2 G: 40 INJECTION, SOLUTION INTRAVENOUS at 14:15

## 2024-03-22 RX ADMIN — CALCIUM GLUCONATE 1 G: 20 INJECTION, SOLUTION INTRAVENOUS at 18:55

## 2024-03-22 RX ADMIN — EPINEPHRINE 0.13 MCG/KG/MIN: 1 INJECTION INTRAMUSCULAR; INTRAVENOUS; SUBCUTANEOUS at 18:34

## 2024-03-22 RX ADMIN — DEXTROSE MONOHYDRATE 25 G: 25 INJECTION, SOLUTION INTRAVENOUS at 01:24

## 2024-03-22 RX ADMIN — ROCURONIUM BROMIDE 50 MG: 10 INJECTION INTRAVENOUS at 14:20

## 2024-03-22 RX ADMIN — Medication 0.05 MCG/KG/MIN: at 15:22

## 2024-03-22 RX ADMIN — CALCIUM CHLORIDE, MAGNESIUM CHLORIDE, DEXTROSE MONOHYDRATE, LACTIC ACID, SODIUM CHLORIDE, SODIUM BICARBONATE AND POTASSIUM CHLORIDE 2400 ML/HR: 5.15; 2.03; 22; 5.4; 6.46; 3.09; .157 INJECTION INTRAVENOUS at 09:06

## 2024-03-22 RX ADMIN — VANCOMYCIN HYDROCHLORIDE 1000 MG: 1 INJECTION, SOLUTION INTRAVENOUS at 03:11

## 2024-03-22 RX ADMIN — SODIUM CHLORIDE 0.5 MCG/KG/MIN: 9 INJECTION, SOLUTION INTRAVENOUS at 19:43

## 2024-03-22 RX ADMIN — HYDROCORTISONE SODIUM SUCCINATE 50 MG: 100 INJECTION, POWDER, FOR SOLUTION INTRAMUSCULAR; INTRAVENOUS at 08:47

## 2024-03-22 RX ADMIN — SODIUM BICARBONATE 50 MEQ: 84 INJECTION, SOLUTION INTRAVENOUS at 08:10

## 2024-03-22 RX ADMIN — Medication 100 MCG/HR: at 15:45

## 2024-03-22 RX ADMIN — HYDROCORTISONE SODIUM SUCCINATE 50 MG: 100 INJECTION, POWDER, FOR SOLUTION INTRAMUSCULAR; INTRAVENOUS at 15:14

## 2024-03-22 RX ADMIN — Medication 25 MCG/HR: at 01:23

## 2024-03-22 RX ADMIN — ANGIOTENSIN II 40 NG/KG/MIN: 2.5 INJECTION INTRAVENOUS at 22:09

## 2024-03-22 RX ADMIN — SODIUM BICARBONATE 25 MEQ/HR: 84 INJECTION, SOLUTION INTRAVENOUS at 11:18

## 2024-03-22 RX ADMIN — Medication 0.05 MCG/KG/MIN: at 02:27

## 2024-03-22 RX ADMIN — VASOPRESSIN 0.06 UNITS/MIN: 0.2 INJECTION INTRAVENOUS at 11:31

## 2024-03-22 RX ADMIN — VASOPRESSIN 0.06 UNITS/MIN: 0.2 INJECTION INTRAVENOUS at 17:59

## 2024-03-22 RX ADMIN — PIPERACILLIN SODIUM AND TAZOBACTAM SODIUM 3.38 G: 3; .375 INJECTION, SOLUTION INTRAVENOUS at 17:43

## 2024-03-22 RX ADMIN — PROPOFOL 20.03 MCG/KG/MIN: 10 INJECTION, EMULSION INTRAVENOUS at 09:44

## 2024-03-22 RX ADMIN — MAGNESIUM SULFATE HEPTAHYDRATE 2 G: 40 INJECTION, SOLUTION INTRAVENOUS at 22:35

## 2024-03-22 RX ADMIN — IOHEXOL 75 ML: 350 INJECTION, SOLUTION INTRAVENOUS at 16:39

## 2024-03-22 RX ADMIN — NOREPINEPHRINE BITARTRATE 0.55 MCG/KG/MIN: 1 INJECTION, SOLUTION, CONCENTRATE INTRAVENOUS at 03:10

## 2024-03-22 RX ADMIN — AMIODARONE HYDROCHLORIDE 150 MG: 1.5 INJECTION, SOLUTION INTRAVENOUS at 21:45

## 2024-03-22 RX ADMIN — CALCIUM CHLORIDE, MAGNESIUM CHLORIDE, DEXTROSE MONOHYDRATE, LACTIC ACID, SODIUM CHLORIDE, SODIUM BICARBONATE AND POTASSIUM CHLORIDE 2400 ML/HR: 5.15; 2.03; 22; 5.4; 6.46; 3.09; .157 INJECTION INTRAVENOUS at 02:30

## 2024-03-22 RX ADMIN — PROPOFOL 50 MCG/KG/MIN: 10 INJECTION, EMULSION INTRAVENOUS at 21:36

## 2024-03-22 RX ADMIN — VANCOMYCIN HYDROCHLORIDE 750 MG: 750 INJECTION, SOLUTION INTRAVENOUS at 18:13

## 2024-03-22 RX ADMIN — INDOMETHACIN 50 MEQ: 25 CAPSULE ORAL at 01:45

## 2024-03-22 RX ADMIN — NOREPINEPHRINE BITARTRATE 0.4 MCG/KG/MIN: 1 INJECTION, SOLUTION, CONCENTRATE INTRAVENOUS at 08:48

## 2024-03-22 RX ADMIN — ROCURONIUM BROMIDE 50 MG: 10 INJECTION, SOLUTION INTRAVENOUS at 14:20

## 2024-03-22 RX ADMIN — PROPOFOL 50 MCG/KG/MIN: 10 INJECTION, EMULSION INTRAVENOUS at 17:59

## 2024-03-22 RX ADMIN — VANCOMYCIN HYDROCHLORIDE 1000 MG: 1 INJECTION, SOLUTION INTRAVENOUS at 03:10

## 2024-03-22 RX ADMIN — ALBUMIN HUMAN 25 G: 50 SOLUTION INTRAVENOUS at 06:36

## 2024-03-22 RX ADMIN — CALCIUM CHLORIDE, MAGNESIUM CHLORIDE, DEXTROSE MONOHYDRATE, LACTIC ACID, SODIUM CHLORIDE, SODIUM BICARBONATE AND POTASSIUM CHLORIDE 2400 ML/HR: 5.15; 2.03; 22; 5.4; 6.46; 3.09; .157 INJECTION INTRAVENOUS at 08:50

## 2024-03-22 RX ADMIN — PROPOFOL 20 MCG/KG/MIN: 10 INJECTION, EMULSION INTRAVENOUS at 01:23

## 2024-03-22 RX ADMIN — EPINEPHRINE 0.13 MCG/KG/MIN: 1 INJECTION INTRAMUSCULAR; INTRAVENOUS; SUBCUTANEOUS at 11:29

## 2024-03-22 RX ADMIN — CALCIUM CHLORIDE, MAGNESIUM CHLORIDE, DEXTROSE MONOHYDRATE, LACTIC ACID, SODIUM CHLORIDE, SODIUM BICARBONATE AND POTASSIUM CHLORIDE 2400 ML/HR: 5.15; 2.03; 22; 5.4; 6.46; 3.09; .157 INJECTION INTRAVENOUS at 09:07

## 2024-03-22 RX ADMIN — MUPIROCIN: 20 OINTMENT TOPICAL at 20:48

## 2024-03-22 RX ADMIN — ROCURONIUM BROMIDE 50 MG: 10 INJECTION INTRAVENOUS at 15:35

## 2024-03-22 RX ADMIN — SODIUM CHLORIDE, POTASSIUM CHLORIDE, SODIUM LACTATE AND CALCIUM CHLORIDE 5 ML/HR: 600; 310; 30; 20 INJECTION, SOLUTION INTRAVENOUS at 08:00

## 2024-03-22 RX ADMIN — AMIODARONE HYDROCHLORIDE 150 MG: 1.5 INJECTION, SOLUTION INTRAVENOUS at 22:45

## 2024-03-22 RX ADMIN — CALCIUM GLUCONATE 1 G: 20 INJECTION, SOLUTION INTRAVENOUS at 13:40

## 2024-03-22 RX ADMIN — ANGIOTENSIN II 40 NG/KG/MIN: 2.5 INJECTION INTRAVENOUS at 06:13

## 2024-03-22 RX ADMIN — PIPERACILLIN SODIUM AND TAZOBACTAM SODIUM 3.38 G: 3; .375 INJECTION, SOLUTION INTRAVENOUS at 22:16

## 2024-03-22 RX ADMIN — ALBUMIN HUMAN 25 G: 0.05 INJECTION, SOLUTION INTRAVENOUS at 06:36

## 2024-03-22 RX ADMIN — SODIUM CHLORIDE 0.51 MCG/KG/MIN: 9 INJECTION, SOLUTION INTRAVENOUS at 14:55

## 2024-03-22 RX ADMIN — ANGIOTENSIN II 39.93 NG/KG/MIN: 2.5 INJECTION INTRAVENOUS at 11:30

## 2024-03-22 ASSESSMENT — PAIN - FUNCTIONAL ASSESSMENT
PAIN_FUNCTIONAL_ASSESSMENT: CPOT (CRITICAL CARE PAIN OBSERVATION TOOL)

## 2024-03-22 NOTE — PROGRESS NOTES
Spiritual Care Visit      attempted to visit, however the patient was sleeping and the  let them continue to rest.  will attempt to follow-up later.    Rev. Socrates Velasquez, Supportive Oncology

## 2024-03-22 NOTE — PROCEDURES
Central Line    Date/Time: 3/22/2024 7:55 AM    Performed by: Krunal Moreno MD  Authorized by: Krunal Moreno MD    Consent:     Consent obtained:  Emergent situation  Universal protocol:     Required blood products, implants, devices, and special equipment available: yes      Immediately prior to procedure, a time out was called: yes      Patient identity confirmed:  Provided demographic data  Pre-procedure details:     Indication(s): central venous access, hemodynamic monitoring and insufficient peripheral access      Hand hygiene: Hand hygiene performed prior to insertion      All elements of maximal sterile barrier technique followed: head covering, mask, sterile gloves, drape, chlorhexidine prep x 2.      Skin preparation:  Chlorhexidine  Sedation:     Sedation type:  None  Anesthesia:     Anesthesia method:  None  Procedure details:     Location:  L subclavian    Patient position:  Trendelenburg    Procedural supplies:  Cordis (MAC)    Catheter size:  9 Fr    Landmarks identified: yes      Ultrasound guidance: no      Number of attempts:  1    Successful placement: yes    Post-procedure details:     Post-procedure:  Dressing applied and line sutured    Assessment:  Blood return through all ports, no pneumothorax on x-ray, free fluid flow and placement verified by x-ray    Procedure completion:  Tolerated  Comments:      Patient critically unstable; sterile kit with sterile prep and sterile gloves utilized; all efforts to maintain sterility performed given the expedited nature of this line's placement; however, given the context of its placement, would advise consideration of removal/resite when clinically feasible.  -Josh LEWIS

## 2024-03-22 NOTE — CONSULTS
[facility]  ACUTE CARE SURGERY - HISTORY AND PHYSICAL / CONSULT    Patient Name: Jason Hollins  MRN: [id number]  Admit Date: 305  : 1960  AGE: 63 y.o.   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 Juan Carlos Otoole on 3/5/4.  Postoperatively was doing well on the floor until he developed chest pain and concern for anterior MI.  Patient then had cardiac arrest and ACLS was initiated.  ROSC was achieved and then patiently developed PEA arrest.  Patient was currently intubated with ROSC and transferred to the CICU.  There is concern for MI versus PE and thus tPA and heparin were administered.  Following this the patient's right thigh resection site had massive hemorrhage and required emergent return to the operating room.  Patient required MTP transfusions and vascular surgery was called in to help control with the significant ligation in the right groin.  The patient now recovering in the CTICU but remains in critical condition.  Overall has a concerning picture for undifferentiated shock and is currently on extremely high doses of Levophed, vasopressin, epinephrine, angiotensin II.  He is also on insulin drip.  He is on broad-spectrum antibiotics.  He developed renal failure and is currently on CVVH.  Despite aggressive resuscitation with MTP and further products, patient has persistent metabolic acidosis with a pH of 7.2, lactate of 14, bicarb of 15.  Overall concern at this point is for unresolving source of sepsis.  Given the massive transfusion, we are consulted to rule out any potential intra-abdominal source of sepsis in setting of global hypoperfusion.   Currently his exam is benign, the abdomen soft and flat, however would recommend CT imaging to further guide evaluation.  Regardless of the results, patient is at an extremely high risk and high morbidity for any further intervention.    - further management based on CT imaging.     ==============================================================================  CHIEF COMPLAINT/REASON FOR CONSULT:  Ongoing multifactorial shock     PAST MEDICAL HISTORY:   PMH:   Past Medical History:   Diagnosis Date    Diabetes mellitus (CMS/HCC)     Hyperlipidemia     Morbid (severe) obesity due to excess calories (CMS/HCC) 05/31/2016    Severe obesity (BMI 35.0-39.9) with comorbidity     Last menstrual period:     PSH:   Past Surgical History:   Procedure Laterality Date    CT GUIDED PERCUTANEOUS BIOPSY MUSCLE  11/13/2023    CT GUIDED PERCUTANEOUS BIOPSY MUSCLE 11/13/2023 [facility] CT    OTHER SURGICAL HISTORY  05/31/2016    History Of Prior Surgery     FH:   Family History   Problem Relation Name Age of Onset    Coronary artery disease Mother      Diabetes Mother      Hypertension Mother      Cancer Father      Diabetes Sister      Diabetes Brother      Diabetes Other Grandmother      SOCIAL HISTORY:    Smoking:    Social History     Tobacco Use   Smoking Status Never   Smokeless Tobacco Never       Alcohol:    Social History     Substance and Sexual Activity   Alcohol Use Never       Drug use:     MEDICATIONS:   Prior to Admission medications    Medication Sig Start Date End Date Taking? Authorizing Provider   aspirin 81 mg EC tablet Take 1 tablet (81 mg) by mouth once daily. AS DIRECTED. 5/31/16  Yes Historical Provider, MD   blood sugar diagnostic (Blood Glucose Test) strip once daily. One Drop Test In Vitro Strip; Test   Yes Historical Provider, MD   chlorhexidine (Peridex) 0.12 % solution Use 15 mL in the mouth or throat see administration instructions for 2 doses. Use the night before and morning of surgery. 2/28/24  Yes Zohra BAHENA  Salvador, APRN-CNP   dulaglutide (Trulicity) 4.5 mg/0.5 mL pen injector Inject 4.5 mg under the skin 1 (one) time per week. 9/15/23  Yes RAQUEL Gregory   dulaglutide (TRULICITY) 4.5 mg/0.5 mL pen injector INJECT ONE PEN (4.5 MG) UNDER THE SKIN ONCE WEEKLY 1/27/24 1/26/25 Yes RAQUEL Gregory   ergocalciferol (Vitamin D-2) 1.25 MG (42575 UT) capsule Take 1 capsule (50,000 Units) by mouth every 14 (fourteen) days. twice monthly on the 15th and the 30 th for vitamin d deficiency 10/11/23  Yes RAQUEL Gregory   fish oil concentrate (Omega-3) 120-180 mg capsule Take 1 capsule (1 g) by mouth.   Yes Historical Provider, MD   gabapentin (Neurontin) 300 mg capsule Use the 100 mg capsule to titrate to 300 mg , then use the 300 mg capsule and take 1 to 2 capsules hs for pain 1/7/24  Yes Basilia Durbin MD   ibuprofen 800 mg tablet Take 1 tablet (800 mg) by mouth 3 times a day as needed for mild pain (1 - 3) (pain). 10/11/23 10/10/24 Yes RAQUEL Gregory   metFORMIN (Glucophage) 1,000 mg tablet TAKE 1 TABLET BY MOUTH EVERY MORNING AND 1.5 TABLETS BY MOUTH EVERY EVENING , 10/11/23  Yes RAQUEL Gregory   multivitamin tablet Take 1 tablet by mouth once daily.   Yes Historical Provider, MD   atorvastatin (Lipitor) 40 mg tablet Take 1 tablet (40 mg) by mouth once daily. 6/30/23 12/27/23  RAQUEL Gregory   NIFEdipine ER (NIFEdipine XL) 30 mg 24 hr tablet Take 1 tablet (30 mg) by mouth once daily in the morning. Take before meals. Do not crush, chew, or split.  Patient not taking: Reported on 3/5/2024 12/19/23 12/18/24  Chiki Carbajal MD   OneTouch Ultra Test strip Test blood sugar once daily  Patient not taking: Reported on 3/5/2024 10/11/23   RAQUEL Gregory   sildenafil (Viagra) 100 mg tablet Take 1 tablet (100 mg) by mouth once daily as needed (1 hour before needed).  Patient not taking: Reported on 3/5/2024 10/11/23   PHILLIP Gregory-CNP    tiZANidine (Zanaflex) 4 mg capsule Take 1 capsule (4 mg) by mouth 3 times a day.  Patient not taking: Reported on 3/5/2024 10/11/23 10/10/24  RAQUEL Gregory     ALLERGIES:   No Known Allergies    REVIEW OF SYSTEMS:  Negative   PHYSICAL EXAM:  Neurological: intubated and sedated  General: intubated, ill appearing   Cardiovascular: On levo 0.5, epi 0.5, vaso 0.06, angiogentin II  Respiratory/Thorax: ACVC, moderate settings, PIP 20's  Genitourinary: voiding minimal dark red urine, on CVVH  Gastrointestinal: soft, non distended  Skin: warm, dry  Musculoskeletal: CUELLAR  Eyes: non-icteric  Extremities: RLE pravenna in tact with drains with dark sanginous output   Psychological: sedated     IMAGING SUMMARY:  (summary of findings, not a copy of dictation)      LABS:  Results from last 7 days   Lab Units 03/22/24  1105 03/22/24  0549 03/22/24  0055   WBC AUTO x10*3/uL 8.5 8.8 7.6   HEMOGLOBIN g/dL 8.0* 9.0* 9.6*   HEMATOCRIT % 22.6* 25.4* 27.1*   PLATELETS AUTO x10*3/uL 82* 90* 78*     Results from last 7 days   Lab Units 03/22/24  1105 03/22/24  0055 03/21/24  1831   APTT seconds 36 37 37   INR  1.9* 1.6* 1.5*     Results from last 7 days   Lab Units 03/22/24  1105 03/22/24  0549 03/22/24  0055 03/21/24  0136 03/20/24  2041   SODIUM mmol/L 142 143 144   < >  --    POTASSIUM mmol/L 4.7 5.0 5.2   < >  --    CHLORIDE mmol/L 98 98 99   < >  --    CO2 mmol/L 16* 14* 18*   < >  --    BUN mg/dL 29* 33* 34*   < >  --    CREATININE mg/dL 3.07* 3.23* 3.14*   < >  --    CALCIUM mg/dL 8.3* 8.4* 9.1   < >  --    PROTEIN TOTAL g/dL  --   --  4.9*  --  5.0*   BILIRUBIN TOTAL mg/dL  --   --  4.0*  --  0.7   ALK PHOS U/L  --   --  68  --  211*   ALT U/L  --   --  1,633*  --  670*   AST U/L  --   --  3,627*  --  887*   GLUCOSE mg/dL 206* 215* 154*   < >  --     < > = values in this interval not displayed.     Results from last 7 days   Lab Units 03/22/24  0055 03/20/24 2041   BILIRUBIN TOTAL mg/dL 4.0* 0.7   BILIRUBIN DIRECT  mg/dL 2.8* 0.3     Results from last 7 days   Lab Units 03/22/24  1101 03/22/24  0750 03/22/24  0654   POCT PH, ARTERIAL pH 7.24* 7.19* 7.21*   POCT PCO2, ARTERIAL mm Hg 35* 36* 35*   POCT PO2, ARTERIAL mm Hg 95 99* 94   POCT HCO3 CALCULATED, ARTERIAL mmol/L 15.0* 13.8* 14.0*   POCT BASE EXCESS, ARTERIAL mmol/L -11.4* -13.4* -12.8*         I have reviewed all laboratory and imaging results ordered/pertinent for this encounter.

## 2024-03-22 NOTE — PROGRESS NOTES
Occupational Therapy                 Therapy Communication Note    Patient Name: Jason Hollins  MRN: [id number]  Today's Date: 3/22/2024     Discipline: Occupational Therapy    Missed Visit Reason: Missed Visit Reason: Patient placed on medical hold (Intubated, sedated, on full support, pressors, CVVH, unstable. WIll reattempt as appropriate)    Missed Time: Attempt    Comment:

## 2024-03-22 NOTE — PROGRESS NOTES
Name: Jason Hollins  MRN: [id number]  Encounter Date: 3/22/2024  PCP: Mary Arenas, PHILLIP-CNP    Reason for consult: ?HLH  Attending Provider: Dr. Payne    Hematology/ Oncology Consult Note      History of Present Illness   Jason Hollins is a 63 y.o. 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 Post operative course was uncomplicated and patient was on RNF until 3/20 for prolonged bed rest to avoid hip flexion to maintain flap integrity. However on 3/20, patient developed a PE arrest and was coded for an hour receiving TPA with ROSC.     Per primary team, patient has undifferentiated shock that is not thought to be cardiogenic or septic. The initial event was thought to be possibly from a massive PE or a massive STEMI. He was initially treated with heparin, but developed a groin hematoma and bruising requiring OR exploration and protamine reversal - 5L EBL from groin site. He is on four pressors and is empirically on Zosyn and vancomycin.    Patient intubated and unable to provide a history.   Past Medical history     Past Medical History:   Diagnosis Date    Diabetes mellitus (CMS/HCC)     Hyperlipidemia     Morbid (severe) obesity due to excess calories (CMS/HCC) 05/31/2016    Severe obesity (BMI 35.0-39.9) with comorbidity         Past Surgical History     Past Surgical History:   Procedure Laterality Date    CT GUIDED PERCUTANEOUS BIOPSY MUSCLE  11/13/2023    CT GUIDED PERCUTANEOUS BIOPSY MUSCLE 11/13/2023 [facility] CT    OTHER SURGICAL HISTORY  05/31/2016    History Of Prior Surgery         Family History      Family History   Problem Relation Name Age of Onset    Coronary artery disease Mother      Diabetes Mother      Hypertension Mother      Cancer Father      Diabetes Sister      Diabetes Brother      Diabetes Other  Grandmother          Social History     Social History     Socioeconomic History    Marital status:      Spouse name: None    Number of children: None    Years of education: None    Highest education level: None   Occupational History    None   Tobacco Use    Smoking status: Never    Smokeless tobacco: Never   Vaping Use    Vaping Use: Never used   Substance and Sexual Activity    Alcohol use: Never    Drug use: Never    Sexual activity: Defer   Other Topics Concern    None   Social History Narrative    None     Social Determinants of Health     Financial Resource Strain: Low Risk  (3/5/2024)    Overall Financial Resource Strain (CARDIA)     Difficulty of Paying Living Expenses: Not hard at all   Food Insecurity: Not on file   Transportation Needs: No Transportation Needs (3/5/2024)    PRAPARE - Transportation     Lack of Transportation (Medical): No     Lack of Transportation (Non-Medical): No   Physical Activity: Not on file   Stress: Not on file   Social Connections: Not on file   Intimate Partner Violence: Not on file   Housing Stability: Low Risk  (3/5/2024)    Housing Stability Vital Sign     Unable to Pay for Housing in the Last Year: No     Number of Places Lived in the Last Year: 1     Unstable Housing in the Last Year: No         Allergies   No Known Allergies    Medications   calcium chloride, 1 g, Once  hydrocortisone sodium succinate, 50 mg, q6h  micafungin, 100 mg, q24h  mupirocin, , TID  oxygen, , Continuous - Inhalation  pantoprazole, 40 mg, Daily  piperacillin-tazobactam, 3.375 g, q6h  sulfur hexafluoride microsphr, 2 mL, Once in imaging  vancomycin, 750 mg, q12h      angiotensin II (Giapreza) 2.5 mg in sodium chloride 0.9% 250 mL (0.01 mg/mL) infusion, Last Rate: 39.934 ng/kg/min (03/22/24 1130)  cisatracurium  EPINEPHrine, Last Rate: 0.128 mcg/kg/min (03/22/24 1129)  epoprostenol, Last Rate: 0.05 mcg/kg/min (03/22/24 1522)  fentaNYL, Last Rate: 100 mcg/hr (03/22/24 1545)  insulin regular  "infusion for cardiac surgery, Last Rate: 3.9 Units/hr (03/22/24 1100)  lactated Ringer's, Last Rate: 5 mL/hr (03/22/24 0800)  lactated Ringer's, Last Rate: 5 mL/hr (03/22/24 0800)  norepinephrine, Last Rate: 0.511 mcg/kg/min (03/22/24 1455)  PrismaSol BGK 2/3.5, Last Rate: 2,400 mL/hr (03/22/24 0907)  propofol, Last Rate: 50 mcg/kg/min (03/22/24 1759)  sodium bicarbonate 250 mEq in dextrose 5 % in water (D5W) 500 mL (0.5 mEq/mL) infusion, Last Rate: 25 mEq/hr (03/22/24 1118)  vasopressin, Last Rate: 0.06 Units/min (03/22/24 1759)      calcium gluconate, 1 g, q6h PRN  calcium gluconate, 2 g, q6h PRN  dextrose, 12.5 g, q15 min PRN  dextrose, 25 g, q15 min PRN  dextrose, 25 g, q15 min PRN   Or  glucagon, 1 mg, q15 min PRN  glucagon, 1 mg, q15 min PRN  HYDROmorphone, 0.2 mg, q4h PRN  insulin regular, 0-15 Units, PRN  magnesium sulfate, 2 g, q6h PRN  magnesium sulfate, 4 g, q6h PRN  potassium chloride, 20 mEq, q6h PRN  potassium chloride, 40 mEq, q6h PRN  vancomycin, , Daily PRN        Review of Systems   Review of Systems   10 pt ROS reviewed and negative aside from above    Physical Exam   Blood pressure (!) 131/41, pulse (!) 114, temperature 36.7 °C (98.1 °F), temperature source Bladder, resp. rate 24, height 1.778 m (5' 10\"), weight 129 kg (284 lb 2.8 oz), SpO2 94 %.    General: intubated, sedated  Neck: Supple, no LAD  CV: tachycardic  Resp: on vent, PEEP 8 FIO2 100  Abd: Soft, NT, ND  Ext: wwp, no edema  Neuro: sedated  Skin: no rash     Labs     Lab Results   Component Value Date    GLUCOSE 206 (H) 03/22/2024    CALCIUM 8.3 (L) 03/22/2024     03/22/2024    K 4.7 03/22/2024    CO2 16 (L) 03/22/2024    CL 98 03/22/2024    BUN 29 (H) 03/22/2024    CREATININE 3.07 (H) 03/22/2024       Lab Results   Component Value Date    WBC 8.5 03/22/2024    HGB 8.0 (L) 03/22/2024    HCT 22.6 (L) 03/22/2024    MCV 84 03/22/2024    PLT 82 (L) 03/22/2024       Lab Results   Component Value Date    ALT 1,618 (H) 03/22/2024    AST " 3,786 (H) 03/22/2024    GGT 21 02/13/2024    ALKPHOS 69 03/22/2024    BILITOT 4.2 (H) 03/22/2024       Imaging   3/22/24 CT Chest    IMPRESSION:  1. Large bilateral pleural effusions, small pericardial effusion,  diffuse ascites, and diffuse body wall edema concerning for fluid overload. Recommend echocardiogram and correlation with patient's fluid status.  2. Localized fat stranding around the 2nd and 3rd portions of the duodenum and the head of the pancreas at the pancreaticoduodenal groove, which can be seen in pancreatitis. Recommend correlation with serum lipase levels to rule out pancreatitis.  3. Soft tissue density within the left groin which could represent  enlarged lymph node versus small hematoma secondary to attempted line placement. There are also several small hematomas with foci of air throughout the right groin likely secondary to attempted line placement.  4. Nodularity along the anterior chest wall, right-greater-than-left, which could represent dilated lymphatic system versus posttraumatic etiology versus soft tissue nodules.      Assessment/Plan   Jason Hollins is a 63 y.o. 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 Post operative course was uncomplicated and patient was on RNF until 3/20 for prolonged bed rest to avoid hip flexion to maintain flap integrity. However on 3/20, patient developed a PE arrest and was coded for an hour receiving TPA with ROSC.     Pertinent Labs:  PT 21.2, PTT 36, fibrinogen 235  8.5/8/82     Peripheral Smear Reviewed   WBCs: toxic appearing PMNs  RBCs: anisopoikilocytosis, some spur cells, no RBC frags   Plt: reduced, no clumping     Please note that at the moment patient's only criteria for HLH (out of 8) is cytopenia - anemia and thrombocytopenia, which have to  be considered in the context of his bleeding and critical illness with antibiotic use. He has no splenomegaly or fever.     There is no obvious trigger for any possible HLH either. He was receiving pazopanib until early February, which could theoretically be a trigger.     Recommend:  - check LDH, ferritin, NK cell activity, soluble IL2R  - B12, folate   - Hepatitis C, B, and HIV serologies   - Reticulocyte count, iron studies  - check PT, PTT, fibrinogen, d-dimer, haptoglobin     Thank you for this consult, we will continue to follow. Pt  discussed with Dr. Blackwell.    Kory Baltazar MD  Hematology- Oncology Fellow, PGY-5  Hematology Consult Pager: [phone]  Oncology Consult Pager: [phone]

## 2024-03-22 NOTE — PROGRESS NOTES
"    Department of Plastic and Reconstructive Surgery  Daily Progress Note    Patient Name: Jason Hollins  MRN: [id number]  Date:  03/22/24     Subjective  Remains critically ill in CTICU,  CRRT initiated overnight. Remains intubated/sedated with levo, vaso, epi, angiotensin II running    Overnight Events  Dialysis line placed, CVVHD initiated    Objective    Vital Signs  BP (!) 131/41   Pulse (!) 118   Temp 36.8 °C (98.2 °F) (Bladder)   Resp (!) 30   Ht 1.778 m (5' 10\")   Wt 129 kg (284 lb 2.8 oz)   SpO2 99%   BMI 40.77 kg/m²      Physical Exam   Constitutional: Intubated and sedated, family at bedside  ENMT: moist mucous membranes, ETT in place, NGT in place  Cardiovascular: Tachycardia on telemetry monitor  Respiratory/Thorax: ETT in place with ventilator support  Gastrointestinal: abdomen soft, non distended  Genitourinary: indwelling daniels with hematuria, oliguric. CVVHD currently running through L fem trialysis cath  Musculoskeletal: Sedated, b/l soft wrist restraints  Extremities: Generalized edema, Right 4Fr CFA and 7Fr CFV sheath in place  RLE: right thigh edematous, large, no bruising noted, soft and compressible, incisional wound vac in place, holding suction at -75 mmHG, no leak or alarm present, ANDERSON drain x 3 with sanguinous output  BLE neurovascular intact, cap refill < 2 sec, +SILT, +df/pf, DP/PT/ radial pulses 2+, no drainage noted.   Neurological:  HANNAH, intubated and sedated  Psychological: HANNAH, intubated and sedated  Skin: Cool to touch  Diagnostics   Results for orders placed or performed during the hospital encounter of 03/05/24 (from the past 24 hour(s))   CBC   Result Value Ref Range    WBC 12.3 (H) 4.4 - 11.3 x10*3/uL    nRBC 2.2 (H) 0.0 - 0.0 /100 WBCs    RBC 3.40 (L) 4.50 - 5.90 x10*6/uL    Hemoglobin 10.1 (L) 13.5 - 17.5 g/dL    Hematocrit 31.0 (L) 41.0 - 52.0 %    MCV 91 80 - 100 fL    MCH 29.7 26.0 - 34.0 pg    MCHC 32.6 32.0 - 36.0 g/dL    RDW 13.5 11.5 - 14.5 %    Platelets 147 (L) 150 - " 450 x10*3/uL   Calcium, Ionized   Result Value Ref Range    POCT Calcium, Ionized 1.25 1.1 - 1.33 mmol/L   Coagulation Screen   Result Value Ref Range    Protime 20.7 (H) 9.8 - 12.8 seconds    INR 1.8 (H) 0.9 - 1.1    aPTT 68 (H) 27 - 38 seconds   Magnesium   Result Value Ref Range    Magnesium 2.65 (H) 1.60 - 2.40 mg/dL   Renal Function Panel   Result Value Ref Range    Glucose 289 (H) 74 - 99 mg/dL    Sodium 142 136 - 145 mmol/L    Potassium 4.0 3.5 - 5.3 mmol/L    Chloride 102 98 - 107 mmol/L    Bicarbonate 15 (L) 21 - 32 mmol/L    Anion Gap 29 (H) 10 - 20 mmol/L    Urea Nitrogen 30 (H) 6 - 23 mg/dL    Creatinine 2.65 (H) 0.50 - 1.30 mg/dL    eGFR 26 (L) >60 mL/min/1.73m*2    Calcium 10.0 8.6 - 10.6 mg/dL    Phosphorus 10.6 (H) 2.5 - 4.9 mg/dL    Albumin 2.8 (L) 3.4 - 5.0 g/dL   Prepare RBC   Result Value Ref Range    PRODUCT CODE Z1461A44     Unit Number [id number]     Unit ABO O     Unit RH POS     XM INTEP COMP     Dispense Status RE     Blood Expiration Date April 17, 2024 23:59 EDT     PRODUCT BLOOD TYPE 5100     UNIT VOLUME 350     PRODUCT CODE L4593U74     Unit Number [id number]     Unit ABO O     Unit RH POS     XM INTEP COMP     Dispense Status TR     Blood Expiration Date April 19, 2024 23:59 EDT     PRODUCT BLOOD TYPE 5100     UNIT VOLUME 350     PRODUCT CODE L9380R27     Unit Number [id number]     Unit ABO O     Unit RH POS     XM INTEP COMP     Dispense Status TR     Blood Expiration Date April 19, 2024 23:59 EDT     PRODUCT BLOOD TYPE 5100     UNIT VOLUME 350     PRODUCT CODE R7356D20     Unit Number [id number]     Unit ABO O     Unit RH POS     XM INTEP COMP     Dispense Status RE     Blood Expiration Date April 19, 2024 23:59 EDT     PRODUCT BLOOD TYPE 5100     UNIT VOLUME 350     PRODUCT CODE O2357F74     Unit Number [id number]     Unit ABO O     Unit RH POS     XM INTEP COMP     Dispense Status RE     Blood Expiration Date April 19, 2024 23:59 EDT     PRODUCT BLOOD TYPE  5100     UNIT VOLUME 350    Prepare Plasma   Result Value Ref Range    PRODUCT CODE C6545S31     Unit Number [id number]     Unit ABO A     Unit RH POS     Dispense Status TR     Blood Expiration Date March 25, 2024 11:17 EDT     PRODUCT BLOOD TYPE 6200     UNIT VOLUME 354     PRODUCT CODE K3801M97     Unit Number [id number]     Unit ABO A     Unit RH POS     Dispense Status TR     Blood Expiration Date March 25, 2024 11:17 EDT     PRODUCT BLOOD TYPE 6200     UNIT VOLUME 327     PRODUCT CODE J5922L39     Unit Number [id number]     Unit ABO A     Unit RH POS     Dispense Status TR     Blood Expiration Date March 25, 2024 11:22 EDT     PRODUCT BLOOD TYPE 6200     UNIT VOLUME 306     PRODUCT CODE F2268H89     Unit Number [id number]     Unit ABO A     Unit RH POS     Dispense Status TR     Blood Expiration Date March 25, 2024 11:22 EDT     PRODUCT BLOOD TYPE 6200     UNIT VOLUME 306     PRODUCT CODE Z4767O96     Unit Number [id number]     Unit ABO A     Unit RH POS     Dispense Status TR     Blood Expiration Date March 26, 2024 06:25 EDT     PRODUCT BLOOD TYPE 6200     UNIT VOLUME 303    Prepare Cryoprecipitated AHF   Result Value Ref Range    PRODUCT CODE G7483A71     Unit Number [id number]     Unit ABO O     Unit RH POS     Dispense Status TR     Blood Expiration Date March 21, 2024 19:07 EDT     PRODUCT BLOOD TYPE 5100     UNIT VOLUME 92    Blood Gas Arterial Full Panel Unsolicited   Result Value Ref Range    POCT pH, Arterial 7.01 (LL) 7.38 - 7.42 pH    POCT pCO2, Arterial 56 (H) 38 - 42 mm Hg    POCT pO2, Arterial 84 (L) 85 - 95 mm Hg    POCT SO2, Arterial 98 94 - 100 %    POCT Oxy Hemoglobin, Arterial 95.4 94.0 - 98.0 %    POCT Hematocrit Calculated, Arterial 28.0 (L) 41.0 - 52.0 %    POCT Sodium, Arterial 140 136 - 145 mmol/L    POCT Potassium, Arterial 3.9 3.5 - 5.3 mmol/L    POCT Chloride, Arterial 102 98 - 107 mmol/L    POCT Ionized Calcium, Arterial 1.02 (L) 1.10 - 1.33 mmol/L     POCT Glucose, Arterial 264 (H) 74 - 99 mg/dL    POCT Lactate, Arterial 13.4 (HH) 0.4 - 2.0 mmol/L    POCT Base Excess, Arterial -16.5 (L) -2.0 - 3.0 mmol/L    POCT HCO3 Calculated, Arterial 14.1 (L) 22.0 - 26.0 mmol/L    POCT Hemoglobin, Arterial 9.4 (L) 13.5 - 17.5 g/dL    POCT Anion Gap, Arterial 28 (H) 10 - 25 mmo/L    Patient Temperature 37.0 degrees Celsius   Prepare Platelets   Result Value Ref Range    PRODUCT CODE X1578V53     Unit Number [id number]     Unit ABO O     Unit RH POS     Dispense Status TR     Blood Expiration Date March 21, 2024 23:59 EDT     PRODUCT BLOOD TYPE 5100     UNIT VOLUME 279    Blood Gas Arterial Full Panel Unsolicited   Result Value Ref Range    POCT pH, Arterial 7.08 (LL) 7.38 - 7.42 pH    POCT pCO2, Arterial 50 (H) 38 - 42 mm Hg    POCT pO2, Arterial 77 (L) 85 - 95 mm Hg    POCT SO2, Arterial 97 94 - 100 %    POCT Oxy Hemoglobin, Arterial 95.1 94.0 - 98.0 %    POCT Hematocrit Calculated, Arterial 31.0 (L) 41.0 - 52.0 %    POCT Sodium, Arterial 140 136 - 145 mmol/L    POCT Potassium, Arterial 3.9 3.5 - 5.3 mmol/L    POCT Chloride, Arterial 101 98 - 107 mmol/L    POCT Ionized Calcium, Arterial 1.27 1.10 - 1.33 mmol/L    POCT Glucose, Arterial 234 (H) 74 - 99 mg/dL    POCT Lactate, Arterial 12.8 (HH) 0.4 - 2.0 mmol/L    POCT Base Excess, Arterial -14.7 (L) -2.0 - 3.0 mmol/L    POCT HCO3 Calculated, Arterial 14.8 (L) 22.0 - 26.0 mmol/L    POCT Hemoglobin, Arterial 10.4 (L) 13.5 - 17.5 g/dL    POCT Anion Gap, Arterial 28 (H) 10 - 25 mmo/L    Patient Temperature 37.0 degrees Celsius   Type and screen   Result Value Ref Range    ABO TYPE O     Rh TYPE POS     ANTIBODY SCREEN NEG    Fibrinogen   Result Value Ref Range    Fibrinogen 136 (L) 200 - 400 mg/dL   Heparin Assay, UFH   Result Value Ref Range    Heparin Unfractionated 0.2 See Comment Below for Therapeutic Ranges IU/mL   Coagulation Screen   Result Value Ref Range    Protime 17.4 (H) 9.8 - 12.8 seconds    INR 1.5 (H) 0.9 - 1.1     aPTT 61 (H) 27 - 38 seconds   Calcium, Ionized   Result Value Ref Range    POCT Calcium, Ionized 1.22 1.1 - 1.33 mmol/L   CBC   Result Value Ref Range    WBC 9.6 4.4 - 11.3 x10*3/uL    nRBC 2.2 (H) 0.0 - 0.0 /100 WBCs    RBC 3.44 (L) 4.50 - 5.90 x10*6/uL    Hemoglobin 10.3 (L) 13.5 - 17.5 g/dL    Hematocrit 30.3 (L) 41.0 - 52.0 %    MCV 88 80 - 100 fL    MCH 29.9 26.0 - 34.0 pg    MCHC 34.0 32.0 - 36.0 g/dL    RDW 13.2 11.5 - 14.5 %    Platelets 104 (L) 150 - 450 x10*3/uL   Renal Function Panel   Result Value Ref Range    Glucose 239 (H) 74 - 99 mg/dL    Sodium 144 136 - 145 mmol/L    Potassium 3.9 3.5 - 5.3 mmol/L    Chloride 103 98 - 107 mmol/L    Bicarbonate 16 (L) 21 - 32 mmol/L    Anion Gap 29 (H) 10 - 20 mmol/L    Urea Nitrogen 28 (H) 6 - 23 mg/dL    Creatinine 2.56 (H) 0.50 - 1.30 mg/dL    eGFR 28 (L) >60 mL/min/1.73m*2    Calcium 9.6 8.6 - 10.6 mg/dL    Phosphorus 9.5 (H) 2.5 - 4.9 mg/dL    Albumin 3.2 (L) 3.4 - 5.0 g/dL   Prepare RBC   Result Value Ref Range    PRODUCT CODE Q8000V02     Unit Number [id number]     Unit ABO O     Unit RH POS     XM INTEP COMP     Dispense Status TR     Blood Expiration Date April 17, 2024 23:59 EDT     PRODUCT BLOOD TYPE 5100     UNIT VOLUME 350     PRODUCT CODE V1863A10     Unit Number [id number]     Unit ABO O     Unit RH POS     XM INTEP COMP     Dispense Status RE     Blood Expiration Date April 20, 2024 23:59 EDT     PRODUCT BLOOD TYPE 5100     UNIT VOLUME 331     PRODUCT CODE F7040C79     Unit Number [id number]     Unit ABO O     Unit RH POS     XM INTEP COMP     Dispense Status RE     Blood Expiration Date April 18, 2024 23:59 EDT     PRODUCT BLOOD TYPE 5100     UNIT VOLUME 350     PRODUCT CODE J7379C34     Unit Number [id number]     Unit ABO O     Unit RH POS     XM INTEP COMP     Dispense Status RE     Blood Expiration Date April 18, 2024 23:59 EDT     PRODUCT BLOOD TYPE 5100     UNIT VOLUME 350     PRODUCT CODE I9030V66     Unit Number  [id number]     Unit ABO O     Unit RH POS     XM INTEP COMP     Dispense Status RE     Blood Expiration Date April 17, 2024 23:59 EDT     PRODUCT BLOOD TYPE 5100     UNIT VOLUME 350    Prepare RBC   Result Value Ref Range    PRODUCT CODE F4877P47     Unit Number [id number]     Unit ABO O     Unit RH POS     XM INTEP COMP     Dispense Status RE     Blood Expiration Date April 20, 2024 23:59 EDT     PRODUCT BLOOD TYPE 5100     UNIT VOLUME 350     PRODUCT CODE B7353V93     Unit Number [id number]     Unit ABO O     Unit RH POS     XM INTEP COMP     Dispense Status RE     Blood Expiration Date April 19, 2024 23:59 EDT     PRODUCT BLOOD TYPE 5100     UNIT VOLUME 281     PRODUCT CODE H3725R25     Unit Number [id number]     Unit ABO O     Unit RH POS     XM INTEP COMP     Dispense Status RE     Blood Expiration Date April 20, 2024 23:59 EDT     PRODUCT BLOOD TYPE 5100     UNIT VOLUME 329     PRODUCT CODE S6514G38     Unit Number [id number]     Unit ABO O     Unit RH POS     XM INTEP COMP     Dispense Status RE     Blood Expiration Date April 19, 2024 23:59 EDT     PRODUCT BLOOD TYPE 5100     UNIT VOLUME 281     PRODUCT CODE U3979E91     Unit Number [id number]     Unit ABO O     Unit RH POS     XM INTEP COMP     Dispense Status RE     Blood Expiration Date April 20, 2024 23:59 EDT     PRODUCT BLOOD TYPE 5100     UNIT VOLUME 332    Prepare Plasma   Result Value Ref Range    PRODUCT CODE Y0128D40     Unit Number [id number]     Unit ABO O     Unit RH POS     Dispense Status RE     Blood Expiration Date March 26, 2024 12:36 EDT     PRODUCT BLOOD TYPE 5100     UNIT VOLUME 306     PRODUCT CODE T4548N90     Unit Number [id number]     Unit ABO O     Unit RH NEG     Dispense Status TR     Blood Expiration Date March 26, 2024 12:36 EDT     PRODUCT BLOOD TYPE 9500     UNIT VOLUME 273     PRODUCT CODE F3566L52     Unit Number [id number]     Unit ABO A     Unit RH POS     Dispense Status RE      Blood Expiration Date March 26, 2024 12:24 EDT     PRODUCT BLOOD TYPE 6200     UNIT VOLUME 217    Prepare Platelets   Result Value Ref Range    PRODUCT CODE P7059M27     Unit Number [id number]     Unit ABO A     Unit RH POS     Dispense Status RE     Blood Expiration Date March 22, 2024 23:59 EDT     PRODUCT BLOOD TYPE 6200     UNIT VOLUME 263    Blood Gas Arterial Full Panel Unsolicited   Result Value Ref Range    POCT pH, Arterial 7.11 (LL) 7.38 - 7.42 pH    POCT pCO2, Arterial 50 (H) 38 - 42 mm Hg    POCT pO2, Arterial 61 (L) 85 - 95 mm Hg    POCT SO2, Arterial 93 (L) 94 - 100 %    POCT Oxy Hemoglobin, Arterial 90.6 (L) 94.0 - 98.0 %    POCT Hematocrit Calculated, Arterial 31.0 (L) 41.0 - 52.0 %    POCT Sodium, Arterial 137 136 - 145 mmol/L    POCT Potassium, Arterial 3.8 3.5 - 5.3 mmol/L    POCT Chloride, Arterial 100 98 - 107 mmol/L    POCT Ionized Calcium, Arterial 1.31 1.10 - 1.33 mmol/L    POCT Glucose, Arterial 204 (H) 74 - 99 mg/dL    POCT Lactate, Arterial 11.5 (HH) 0.4 - 2.0 mmol/L    POCT Base Excess, Arterial -13.2 (L) -2.0 - 3.0 mmol/L    POCT HCO3 Calculated, Arterial 15.9 (L) 22.0 - 26.0 mmol/L    POCT Hemoglobin, Arterial 10.4 (L) 13.5 - 17.5 g/dL    POCT Anion Gap, Arterial 25 10 - 25 mmo/L    Patient Temperature 37.0 degrees Celsius    FiO2 100 %   Coox Panel, Arterial Unsolicited   Result Value Ref Range    POCT Hemoglobin, Arterial 10.4 (L) 13.5 - 17.5 g/dL    POCT Oxy Hemoglobin, Arterial 90.6 (L) 94.0 - 98.0 %    POCT Carboxyhemoglobin, Arterial 1.3 %    POCT Methemoglobin, Arterial 1.1 0.0 - 1.5 %    POCT Deoxy Hemoglobin, Arterial 7.0 (H) 0.0 - 5.0 %   TEG Clot Global Profile Unsolicited   Result Value Ref Range    R (Reaction Time) K >17.0 (H) 4.6 - 9.1 min    K (Clot Kinetics) >5.0 (H) 0.8 - 2.1 min    ANGLE 43.0 (L) 63.0 - 78.0 deg    MA (Max Amplitude) K 41.0 (L) 52.0 - 69.0 mm    R (Reaction Time) KH 11.1 (H) 4.3 - 8.3 min    MA (Max Amplitude) RT 50.0 (L) 52.0 - 70.0 mm    MA  ( Ramiro Amplitude) FF 8.0 (L) 15.0 - 32.0 mm    FLEV 157 (L) 278 - 581 mg/dL    Test Comment baseline    Blood Gas Arterial Full Panel Unsolicited   Result Value Ref Range    POCT pH, Arterial 7.10 (LL) 7.38 - 7.42 pH    POCT pCO2, Arterial 50 (H) 38 - 42 mm Hg    POCT pO2, Arterial 57 (L) 85 - 95 mm Hg    POCT SO2, Arterial 91 (L) 94 - 100 %    POCT Oxy Hemoglobin, Arterial 89.1 (L) 94.0 - 98.0 %    POCT Hematocrit Calculated, Arterial 34.0 (L) 41.0 - 52.0 %    POCT Sodium, Arterial 137 136 - 145 mmol/L    POCT Potassium, Arterial 4.2 3.5 - 5.3 mmol/L    POCT Chloride, Arterial 105 98 - 107 mmol/L    POCT Ionized Calcium, Arterial 1.27 1.10 - 1.33 mmol/L    POCT Glucose, Arterial 228 (H) 74 - 99 mg/dL    POCT Lactate, Arterial 11.1 (HH) 0.4 - 2.0 mmol/L    POCT Base Excess, Arterial -13.9 (L) -2.0 - 3.0 mmol/L    POCT HCO3 Calculated, Arterial 15.5 (L) 22.0 - 26.0 mmol/L    POCT Hemoglobin, Arterial 11.4 (L) 13.5 - 17.5 g/dL    POCT Anion Gap, Arterial 21 10 - 25 mmo/L    Patient Temperature 37.0 degrees Celsius    FiO2 100 %   Prepare Cryoprecipitated AHF (Pooled Units): 4 Pools   Result Value Ref Range    PRODUCT CODE I6941Z82     Unit Number [id number]     Unit ABO O     Unit RH POS     Dispense Status IS     Blood Expiration Date March 21, 2024 19:45 EDT     PRODUCT BLOOD TYPE 5100     UNIT VOLUME 85     PRODUCT CODE H2403F75     Unit Number [id number]     Unit ABO O     Unit RH POS     Dispense Status TR     Blood Expiration Date March 21, 2024 19:45 EDT     PRODUCT BLOOD TYPE 5100     UNIT VOLUME 75     PRODUCT CODE T3298X59     Unit Number [id number]     Unit ABO O     Unit RH POS     Dispense Status TR     Blood Expiration Date March 21, 2024 19:45 EDT     PRODUCT BLOOD TYPE 5100     UNIT VOLUME 85     PRODUCT CODE L2847I68     Unit Number [id number]     Unit ABO A     Unit RH POS     Dispense Status TR     Blood Expiration Date March 21, 2024 16:52 EDT     PRODUCT BLOOD TYPE 6200     UNIT  VOLUME 65    Prepare Plasma   Result Value Ref Range    PRODUCT CODE F8672K43     Unit Number [id number]     Unit ABO A     Unit RH POS     Dispense Status XM     Blood Expiration Date March 26, 2024 12:24 EDT     PRODUCT BLOOD TYPE 6200     UNIT VOLUME 218     PRODUCT CODE X3885F34     Unit Number [id number]     Unit ABO A     Unit RH POS     Dispense Status XM     Blood Expiration Date March 26, 2024 12:24 EDT     PRODUCT BLOOD TYPE 6200     UNIT VOLUME 264     PRODUCT CODE A8733O06     Unit Number [id number]     Unit ABO A     Unit RH POS     Dispense Status XM     Blood Expiration Date March 26, 2024 12:36 EDT     PRODUCT BLOOD TYPE 6200     UNIT VOLUME 318     PRODUCT CODE C3161B62     Unit Number [id number]     Unit ABO A     Unit RH POS     Dispense Status XM     Blood Expiration Date March 26, 2024 12:36 EDT     PRODUCT BLOOD TYPE 6200     UNIT VOLUME 328     PRODUCT CODE N5116L91     Unit Number [id number]     Unit ABO A     Unit RH NEG     Dispense Status XM     Blood Expiration Date March 26, 2024 12:36 EDT     PRODUCT BLOOD TYPE 0600     UNIT VOLUME 332    Prepare Platelets   Result Value Ref Range    PRODUCT CODE U9915L73     Unit Number [id number]     Unit ABO A     Unit RH POS     Dispense Status XM     Blood Expiration Date March 22, 2024 23:59 EDT     PRODUCT BLOOD TYPE 6200     UNIT VOLUME 276    Prepare RBC   Result Value Ref Range    PRODUCT CODE U3807X28     Unit Number [id number]     Unit ABO O     Unit RH POS     XM INTEP COMP     Dispense Status RE     Blood Expiration Date April 09, 2024 23:59 EDT     PRODUCT BLOOD TYPE 5100     UNIT VOLUME 350     PRODUCT CODE C5295Q87     Unit Number [id number]     Unit ABO O     Unit RH POS     XM INTEP COMP     Dispense Status RE     Blood Expiration Date April 10, 2024 23:59 EDT     PRODUCT BLOOD TYPE 5100     UNIT VOLUME 350     PRODUCT CODE L9079F03     Unit Number [id number]     Unit ABO O     Unit RH POS      XM INTEP COMP     Dispense Status RE     Blood Expiration Date April 10, 2024 23:59 EDT     PRODUCT BLOOD TYPE 5100     UNIT VOLUME 278     PRODUCT CODE V3140F02     Unit Number [id number]     Unit ABO O     Unit RH POS     XM INTEP COMP     Dispense Status RE     Blood Expiration Date April 07, 2024 23:59 EDT     PRODUCT BLOOD TYPE 5100     UNIT VOLUME 293     PRODUCT CODE H5336Y41     Unit Number [id number]     Unit ABO O     Unit RH POS     XM INTEP COMP     Dispense Status RE     Blood Expiration Date April 13, 2024 23:59 EDT     PRODUCT BLOOD TYPE 5100     UNIT VOLUME 320    Blood Gas Arterial Full Panel Unsolicited   Result Value Ref Range    POCT pH, Arterial 7.15 (LL) 7.38 - 7.42 pH    POCT pCO2, Arterial 49 (H) 38 - 42 mm Hg    POCT pO2, Arterial 64 (L) 85 - 95 mm Hg    POCT SO2, Arterial 95 94 - 100 %    POCT Oxy Hemoglobin, Arterial 92.4 (L) 94.0 - 98.0 %    POCT Hematocrit Calculated, Arterial 32.0 (L) 41.0 - 52.0 %    POCT Sodium, Arterial 137 136 - 145 mmol/L    POCT Potassium, Arterial 3.9 3.5 - 5.3 mmol/L    POCT Chloride, Arterial 103 98 - 107 mmol/L    POCT Ionized Calcium, Arterial 1.27 1.10 - 1.33 mmol/L    POCT Glucose, Arterial 206 (H) 74 - 99 mg/dL    POCT Lactate, Arterial 11.1 (HH) 0.4 - 2.0 mmol/L    POCT Base Excess, Arterial -11.4 (L) -2.0 - 3.0 mmol/L    POCT HCO3 Calculated, Arterial 17.1 (L) 22.0 - 26.0 mmol/L    POCT Hemoglobin, Arterial 10.6 (L) 13.5 - 17.5 g/dL    POCT Anion Gap, Arterial 21 10 - 25 mmo/L    Patient Temperature 37.0 degrees Celsius    FiO2 100 %   Blood Gas Arterial Full Panel Unsolicited   Result Value Ref Range    POCT pH, Arterial 7.12 (LL) 7.38 - 7.42 pH    POCT pCO2, Arterial 52 (H) 38 - 42 mm Hg    POCT pO2, Arterial 48 (L) 85 - 95 mm Hg    POCT SO2, Arterial 84 (L) 94 - 100 %    POCT Oxy Hemoglobin, Arterial 81.7 (L) 94.0 - 98.0 %    POCT Hematocrit Calculated, Arterial 31.0 (L) 41.0 - 52.0 %    POCT Sodium, Arterial 139 136 - 145 mmol/L    POCT  Potassium, Arterial 3.9 3.5 - 5.3 mmol/L    POCT Chloride, Arterial 102 98 - 107 mmol/L    POCT Ionized Calcium, Arterial 1.19 1.10 - 1.33 mmol/L    POCT Glucose, Arterial 200 (H) 74 - 99 mg/dL    POCT Lactate, Arterial 10.7 (HH) 0.4 - 2.0 mmol/L    POCT Base Excess, Arterial -12.1 (L) -2.0 - 3.0 mmol/L    POCT HCO3 Calculated, Arterial 16.9 (L) 22.0 - 26.0 mmol/L    POCT Hemoglobin, Arterial 10.2 (L) 13.5 - 17.5 g/dL    POCT Anion Gap, Arterial 24 10 - 25 mmo/L    Patient Temperature 37.0 degrees Celsius    FiO2 100 %   Coox Panel, Arterial Unsolicited   Result Value Ref Range    POCT Hemoglobin, Arterial 10.2 (L) 13.5 - 17.5 g/dL    POCT Oxy Hemoglobin, Arterial 81.7 (L) 94.0 - 98.0 %    POCT Carboxyhemoglobin, Arterial 1.6 %    POCT Methemoglobin, Arterial 1.2 0.0 - 1.5 %    POCT Deoxy Hemoglobin, Arterial 15.5 (H) 0.0 - 5.0 %   Blood Gas Arterial Full Panel Unsolicited   Result Value Ref Range    POCT pH, Arterial 7.17 (LL) 7.38 - 7.42 pH    POCT pCO2, Arterial 49 (H) 38 - 42 mm Hg    POCT pO2, Arterial 60 (L) 85 - 95 mm Hg    POCT SO2, Arterial 95 94 - 100 %    POCT Oxy Hemoglobin, Arterial 92.4 (L) 94.0 - 98.0 %    POCT Hematocrit Calculated, Arterial 29.0 (L) 41.0 - 52.0 %    POCT Sodium, Arterial 139 136 - 145 mmol/L    POCT Potassium, Arterial 4.0 3.5 - 5.3 mmol/L    POCT Chloride, Arterial 105 98 - 107 mmol/L    POCT Ionized Calcium, Arterial 1.22 1.10 - 1.33 mmol/L    POCT Glucose, Arterial 188 (H) 74 - 99 mg/dL    POCT Lactate, Arterial 10.5 (HH) 0.4 - 2.0 mmol/L    POCT Base Excess, Arterial -10.3 (L) -2.0 - 3.0 mmol/L    POCT HCO3 Calculated, Arterial 17.9 (L) 22.0 - 26.0 mmol/L    POCT Hemoglobin, Arterial 9.8 (L) 13.5 - 17.5 g/dL    POCT Anion Gap, Arterial 20 10 - 25 mmo/L    Patient Temperature 37.0 degrees Celsius    FiO2 100 %   Blood Gas Arterial Full Panel Unsolicited   Result Value Ref Range    POCT pH, Arterial 7.16 (LL) 7.38 - 7.42 pH    POCT pCO2, Arterial 50 (H) 38 - 42 mm Hg    POCT pO2,  Arterial 62 (L) 85 - 95 mm Hg    POCT SO2, Arterial 95 94 - 100 %    POCT Oxy Hemoglobin, Arterial 92.6 (L) 94.0 - 98.0 %    POCT Hematocrit Calculated, Arterial 30.0 (L) 41.0 - 52.0 %    POCT Sodium, Arterial 140 136 - 145 mmol/L    POCT Potassium, Arterial 4.1 3.5 - 5.3 mmol/L    POCT Chloride, Arterial 104 98 - 107 mmol/L    POCT Ionized Calcium, Arterial 1.30 1.10 - 1.33 mmol/L    POCT Glucose, Arterial 183 (H) 74 - 99 mg/dL    POCT Lactate, Arterial 10.1 (HH) 0.4 - 2.0 mmol/L    POCT Base Excess, Arterial -10.6 (L) -2.0 - 3.0 mmol/L    POCT HCO3 Calculated, Arterial 17.8 (L) 22.0 - 26.0 mmol/L    POCT Hemoglobin, Arterial 10.0 (L) 13.5 - 17.5 g/dL    POCT Anion Gap, Arterial 22 10 - 25 mmo/L    Patient Temperature 37.0 degrees Celsius    FiO2 100 %   Blood Gas Arterial Full Panel Unsolicited   Result Value Ref Range    POCT pH, Arterial 7.23 (LL) 7.38 - 7.42 pH    POCT pCO2, Arterial 45 (H) 38 - 42 mm Hg    POCT pO2, Arterial 62 (L) 85 - 95 mm Hg    POCT SO2, Arterial 96 94 - 100 %    POCT Oxy Hemoglobin, Arterial 93.0 (L) 94.0 - 98.0 %    POCT Hematocrit Calculated, Arterial 29.0 (L) 41.0 - 52.0 %    POCT Sodium, Arterial 141 136 - 145 mmol/L    POCT Potassium, Arterial 4.1 3.5 - 5.3 mmol/L    POCT Chloride, Arterial 104 98 - 107 mmol/L    POCT Ionized Calcium, Arterial 1.28 1.10 - 1.33 mmol/L    POCT Glucose, Arterial 171 (H) 74 - 99 mg/dL    POCT Lactate, Arterial 9.9 (HH) 0.4 - 2.0 mmol/L    POCT Base Excess, Arterial -8.4 (L) -2.0 - 3.0 mmol/L    POCT HCO3 Calculated, Arterial 18.8 (L) 22.0 - 26.0 mmol/L    POCT Hemoglobin, Arterial 9.7 (L) 13.5 - 17.5 g/dL    POCT Anion Gap, Arterial 22 10 - 25 mmo/L    Patient Temperature 37.0 degrees Celsius    FiO2 100 %   Blood Gas Arterial Full Panel Unsolicited   Result Value Ref Range    POCT pH, Arterial 7.25 (LL) 7.38 - 7.42 pH    POCT pCO2, Arterial 45 (H) 38 - 42 mm Hg    POCT pO2, Arterial 60 (L) 85 - 95 mm Hg    POCT SO2, Arterial 95 94 - 100 %    POCT Oxy  Hemoglobin, Arterial 92.1 (L) 94.0 - 98.0 %    POCT Hematocrit Calculated, Arterial 29.0 (L) 41.0 - 52.0 %    POCT Sodium, Arterial 140 136 - 145 mmol/L    POCT Potassium, Arterial 4.4 3.5 - 5.3 mmol/L    POCT Chloride, Arterial 103 98 - 107 mmol/L    POCT Ionized Calcium, Arterial 1.15 1.10 - 1.33 mmol/L    POCT Glucose, Arterial 172 (H) 74 - 99 mg/dL    POCT Lactate, Arterial 9.7 (HH) 0.4 - 2.0 mmol/L    POCT Base Excess, Arterial -7.2 (L) -2.0 - 3.0 mmol/L    POCT HCO3 Calculated, Arterial 19.7 (L) 22.0 - 26.0 mmol/L    POCT Hemoglobin, Arterial 9.7 (L) 13.5 - 17.5 g/dL    POCT Anion Gap, Arterial 22 10 - 25 mmo/L    Patient Temperature 37.0 degrees Celsius    FiO2 100 %   Blood Gas Arterial Full Panel Unsolicited   Result Value Ref Range    POCT pH, Arterial 7.29 (L) 7.38 - 7.42 pH    POCT pCO2, Arterial 47 (H) 38 - 42 mm Hg    POCT pO2, Arterial 60 (L) 85 - 95 mm Hg    POCT SO2, Arterial 94 94 - 100 %    POCT Oxy Hemoglobin, Arterial 91.9 (L) 94.0 - 98.0 %    POCT Hematocrit Calculated, Arterial 29.0 (L) 41.0 - 52.0 %    POCT Sodium, Arterial 140 136 - 145 mmol/L    POCT Potassium, Arterial 4.5 3.5 - 5.3 mmol/L    POCT Chloride, Arterial 102 98 - 107 mmol/L    POCT Ionized Calcium, Arterial 1.27 1.10 - 1.33 mmol/L    POCT Glucose, Arterial 174 (H) 74 - 99 mg/dL    POCT Lactate, Arterial 10.9 (HH) 0.4 - 2.0 mmol/L    POCT Base Excess, Arterial -4.0 (L) -2.0 - 3.0 mmol/L    POCT HCO3 Calculated, Arterial 22.6 22.0 - 26.0 mmol/L    POCT Hemoglobin, Arterial 9.7 (L) 13.5 - 17.5 g/dL    POCT Anion Gap, Arterial 20 10 - 25 mmo/L    Patient Temperature 37.0 degrees Celsius    FiO2 100 %   Coox Panel, Arterial Unsolicited   Result Value Ref Range    POCT Hemoglobin, Arterial 9.7 (L) 13.5 - 17.5 g/dL    POCT Oxy Hemoglobin, Arterial 91.9 (L) 94.0 - 98.0 %    POCT Carboxyhemoglobin, Arterial 1.7 %    POCT Methemoglobin, Arterial 0.9 0.0 - 1.5 %    POCT Deoxy Hemoglobin, Arterial 5.5 (H) 0.0 - 5.0 %   TEG Clot Global  Profile   Result Value Ref Range    R (Reaction Time) K 12.4 (H) 4.6 - 9.1 min    K (Clot Kinetics) >5.0 (H) 0.8 - 2.1 min    ANGLE <39.0 (L) 63.0 - 78.0 deg    MA (Max Amplitude) K 54.0 52.0 - 69.0 mm    R (Reaction Time) KH 11.0 (H) 4.3 - 8.3 min    MA (Max Amplitude) RT 54.0 52.0 - 70.0 mm    MA ( Ramiro Amplitude) FF 18.0 15.0 - 32.0 mm    FLEV 325 278 - 581 mg/dL   Fibrinogen   Result Value Ref Range    Fibrinogen 234 200 - 400 mg/dL   Renal Function Panel   Result Value Ref Range    Glucose 158 (H) 74 - 99 mg/dL    Sodium 145 136 - 145 mmol/L    Potassium 4.5 3.5 - 5.3 mmol/L    Chloride 100 98 - 107 mmol/L    Bicarbonate 23 21 - 32 mmol/L    Anion Gap 27 (H) 10 - 20 mmol/L    Urea Nitrogen 28 (H) 6 - 23 mg/dL    Creatinine 2.64 (H) 0.50 - 1.30 mg/dL    eGFR 27 (L) >60 mL/min/1.73m*2    Calcium 9.6 8.6 - 10.6 mg/dL    Phosphorus 8.7 (H) 2.5 - 4.9 mg/dL    Albumin 3.3 (L) 3.4 - 5.0 g/dL   Magnesium   Result Value Ref Range    Magnesium 1.88 1.60 - 2.40 mg/dL   Coagulation Screen   Result Value Ref Range    Protime 16.7 (H) 9.8 - 12.8 seconds    INR 1.5 (H) 0.9 - 1.1    aPTT 37 27 - 38 seconds   CBC   Result Value Ref Range    WBC 5.3 4.4 - 11.3 x10*3/uL    nRBC 3.2 (H) 0.0 - 0.0 /100 WBCs    RBC 3.36 (L) 4.50 - 5.90 x10*6/uL    Hemoglobin 9.7 (L) 13.5 - 17.5 g/dL    Hematocrit 28.1 (L) 41.0 - 52.0 %    MCV 84 80 - 100 fL    MCH 28.9 26.0 - 34.0 pg    MCHC 34.5 32.0 - 36.0 g/dL    RDW 13.8 11.5 - 14.5 %    Platelets 64 (L) 150 - 450 x10*3/uL   Calcium, Ionized   Result Value Ref Range    POCT Calcium, Ionized 1.21 1.1 - 1.33 mmol/L   Blood Gas Arterial Full Panel   Result Value Ref Range    POCT pH, Arterial 7.29 (L) 7.38 - 7.42 pH    POCT pCO2, Arterial 43 (H) 38 - 42 mm Hg    POCT pO2, Arterial 67 (L) 85 - 95 mm Hg    POCT SO2, Arterial 97 94 - 100 %    POCT Oxy Hemoglobin, Arterial 94.1 94.0 - 98.0 %    POCT Hematocrit Calculated, Arterial 30.0 (L) 41.0 - 52.0 %    POCT Sodium, Arterial 141 136 - 145 mmol/L     POCT Potassium, Arterial 4.5 3.5 - 5.3 mmol/L    POCT Chloride, Arterial 101 98 - 107 mmol/L    POCT Ionized Calcium, Arterial 1.24 1.10 - 1.33 mmol/L    POCT Glucose, Arterial 148 (H) 74 - 99 mg/dL    POCT Lactate, Arterial 9.1 (HH) 0.4 - 2.0 mmol/L    POCT Base Excess, Arterial -5.6 (L) -2.0 - 3.0 mmol/L    POCT HCO3 Calculated, Arterial 20.7 (L) 22.0 - 26.0 mmol/L    POCT Hemoglobin, Arterial 9.9 (L) 13.5 - 17.5 g/dL    POCT Anion Gap, Arterial 24 10 - 25 mmo/L    Patient Temperature 37.0 degrees Celsius    FiO2 100 %   POCT GLUCOSE   Result Value Ref Range    POCT Glucose 147 (H) 74 - 99 mg/dL   POCT GLUCOSE   Result Value Ref Range    POCT Glucose 152 (H) 74 - 99 mg/dL   Blood Gas Arterial Full Panel   Result Value Ref Range    POCT pH, Arterial 7.25 (LL) 7.38 - 7.42 pH    POCT pCO2, Arterial 43 (H) 38 - 42 mm Hg    POCT pO2, Arterial 64 (L) 85 - 95 mm Hg    POCT SO2, Arterial 94 94 - 100 %    POCT Oxy Hemoglobin, Arterial 91.7 (L) 94.0 - 98.0 %    POCT Hematocrit Calculated, Arterial 29.0 (L) 41.0 - 52.0 %    POCT Sodium, Arterial 141 136 - 145 mmol/L    POCT Potassium, Arterial 5.0 3.5 - 5.3 mmol/L    POCT Chloride, Arterial 101 98 - 107 mmol/L    POCT Ionized Calcium, Arterial 1.18 1.10 - 1.33 mmol/L    POCT Glucose, Arterial 151 (H) 74 - 99 mg/dL    POCT Lactate, Arterial 10.3 (HH) 0.4 - 2.0 mmol/L    POCT Base Excess, Arterial -7.9 (L) -2.0 - 3.0 mmol/L    POCT HCO3 Calculated, Arterial 18.9 (L) 22.0 - 26.0 mmol/L    POCT Hemoglobin, Arterial 9.7 (L) 13.5 - 17.5 g/dL    POCT Anion Gap, Arterial 26 (H) 10 - 25 mmo/L    Patient Temperature 37.0 degrees Celsius    FiO2 100 %   Prepare Plasma: 1 Units   Result Value Ref Range    PRODUCT CODE B7117Y04     Unit Number [id number]     Unit ABO O     Unit RH POS     Dispense Status TR     Blood Expiration Date March 26, 2024 12:36 EDT     PRODUCT BLOOD TYPE 5100     UNIT VOLUME 307    Prepare Cryoprecipitated AHF (Pooled Units): 1 Pools   Result Value Ref  Range    PRODUCT CODE V9376B68     Unit Number [id number]     Unit ABO O     Unit RH POS     Dispense Status TR     Blood Expiration Date March 22, 2024 05:40 EDT     PRODUCT BLOOD TYPE 5100     UNIT VOLUME 65    Prepare Cryoprecipitated AHF (Pooled Units): 1 Pools   Result Value Ref Range    PRODUCT CODE I3918L52     Unit Number [id number]     Unit ABO O     Unit RH POS     Dispense Status TR     Blood Expiration Date March 22, 2024 04:31 EDT     PRODUCT BLOOD TYPE 5100     UNIT VOLUME 84    Blood Gas Arterial Full Panel   Result Value Ref Range    POCT pH, Arterial 7.20 (LL) 7.38 - 7.42 pH    POCT pCO2, Arterial 42 38 - 42 mm Hg    POCT pO2, Arterial 70 (L) 85 - 95 mm Hg    POCT SO2, Arterial 95 94 - 100 %    POCT Oxy Hemoglobin, Arterial 92.8 (L) 94.0 - 98.0 %    POCT Hematocrit Calculated, Arterial 29.0 (L) 41.0 - 52.0 %    POCT Sodium, Arterial 141 136 - 145 mmol/L    POCT Potassium, Arterial 5.5 (H) 3.5 - 5.3 mmol/L    POCT Chloride, Arterial 100 98 - 107 mmol/L    POCT Ionized Calcium, Arterial 1.14 1.10 - 1.33 mmol/L    POCT Glucose, Arterial 150 (H) 74 - 99 mg/dL    POCT Lactate, Arterial 13.0 (HH) 0.4 - 2.0 mmol/L    POCT Base Excess, Arterial -11.0 (L) -2.0 - 3.0 mmol/L    POCT HCO3 Calculated, Arterial 16.4 (L) 22.0 - 26.0 mmol/L    POCT Hemoglobin, Arterial 9.6 (L) 13.5 - 17.5 g/dL    POCT Anion Gap, Arterial 30 (H) 10 - 25 mmo/L    Patient Temperature 37.0 degrees Celsius    FiO2 100 %   CBC   Result Value Ref Range    WBC 7.6 4.4 - 11.3 x10*3/uL    nRBC 1.3 (H) 0.0 - 0.0 /100 WBCs    RBC 3.23 (L) 4.50 - 5.90 x10*6/uL    Hemoglobin 9.6 (L) 13.5 - 17.5 g/dL    Hematocrit 27.1 (L) 41.0 - 52.0 %    MCV 84 80 - 100 fL    MCH 29.7 26.0 - 34.0 pg    MCHC 35.4 32.0 - 36.0 g/dL    RDW 14.6 (H) 11.5 - 14.5 %    Platelets 78 (L) 150 - 450 x10*3/uL   Calcium, Ionized   Result Value Ref Range    POCT Calcium, Ionized 1.09 (L) 1.1 - 1.33 mmol/L   Coagulation Screen   Result Value Ref Range    Protime 18.4  (H) 9.8 - 12.8 seconds    INR 1.6 (H) 0.9 - 1.1    aPTT 37 27 - 38 seconds   Magnesium   Result Value Ref Range    Magnesium 2.15 1.60 - 2.40 mg/dL   Renal Function Panel   Result Value Ref Range    Glucose 154 (H) 74 - 99 mg/dL    Sodium 144 136 - 145 mmol/L    Potassium 5.2 3.5 - 5.3 mmol/L    Chloride 99 98 - 107 mmol/L    Bicarbonate 18 (L) 21 - 32 mmol/L    Anion Gap 32 (H) 10 - 20 mmol/L    Urea Nitrogen 34 (H) 6 - 23 mg/dL    Creatinine 3.14 (H) 0.50 - 1.30 mg/dL    eGFR 22 (L) >60 mL/min/1.73m*2    Calcium 9.1 8.6 - 10.6 mg/dL    Phosphorus 9.8 (H) 2.5 - 4.9 mg/dL    Albumin 3.3 (L) 3.4 - 5.0 g/dL   Hepatic function panel   Result Value Ref Range    Albumin 3.3 (L) 3.4 - 5.0 g/dL    Bilirubin, Total 4.0 (H) 0.0 - 1.2 mg/dL    Bilirubin, Direct 2.8 (H) 0.0 - 0.3 mg/dL    Alkaline Phosphatase 68 33 - 136 U/L    ALT 1,633 (H) 10 - 52 U/L    AST 3,627 (H) 9 - 39 U/L    Total Protein 4.9 (L) 6.4 - 8.2 g/dL   Amylase   Result Value Ref Range    Amylase 600 (H) 29 - 103 U/L   Lipase   Result Value Ref Range    Lipase 1,348 (H) 9 - 82 U/L   Blood Gas Arterial Full Panel   Result Value Ref Range    POCT pH, Arterial 7.16 (LL) 7.38 - 7.42 pH    POCT pCO2, Arterial 44 (H) 38 - 42 mm Hg    POCT pO2, Arterial 68 (L) 85 - 95 mm Hg    POCT SO2, Arterial 95 94 - 100 %    POCT Oxy Hemoglobin, Arterial 92.7 (L) 94.0 - 98.0 %    POCT Hematocrit Calculated, Arterial 27.0 (L) 41.0 - 52.0 %    POCT Sodium, Arterial 140 136 - 145 mmol/L    POCT Potassium, Arterial 5.2 3.5 - 5.3 mmol/L    POCT Chloride, Arterial 100 98 - 107 mmol/L    POCT Ionized Calcium, Arterial 1.11 1.10 - 1.33 mmol/L    POCT Glucose, Arterial 195 (H) 74 - 99 mg/dL    POCT Lactate, Arterial 14.6 (HH) 0.4 - 2.0 mmol/L    POCT Base Excess, Arterial -12.3 (L) -2.0 - 3.0 mmol/L    POCT HCO3 Calculated, Arterial 15.7 (L) 22.0 - 26.0 mmol/L    POCT Hemoglobin, Arterial 9.0 (L) 13.5 - 17.5 g/dL    POCT Anion Gap, Arterial 30 (H) 10 - 25 mmo/L    Patient Temperature  37.0 degrees Celsius    FiO2 100 %   POCT GLUCOSE   Result Value Ref Range    POCT Glucose 154 (H) 74 - 99 mg/dL   Blood Gas Arterial Full Panel   Result Value Ref Range    POCT pH, Arterial 7.22 (LL) 7.38 - 7.42 pH    POCT pCO2, Arterial 37 (L) 38 - 42 mm Hg    POCT pO2, Arterial 75 (L) 85 - 95 mm Hg    POCT SO2, Arterial 97 94 - 100 %    POCT Oxy Hemoglobin, Arterial 94.9 94.0 - 98.0 %    POCT Hematocrit Calculated, Arterial 27.0 (L) 41.0 - 52.0 %    POCT Sodium, Arterial 139 136 - 145 mmol/L    POCT Potassium, Arterial 5.1 3.5 - 5.3 mmol/L    POCT Chloride, Arterial 100 98 - 107 mmol/L    POCT Ionized Calcium, Arterial 1.09 (L) 1.10 - 1.33 mmol/L    POCT Glucose, Arterial 188 (H) 74 - 99 mg/dL    POCT Lactate, Arterial 14.5 (HH) 0.4 - 2.0 mmol/L    POCT Base Excess, Arterial -11.7 (L) -2.0 - 3.0 mmol/L    POCT HCO3 Calculated, Arterial 15.1 (L) 22.0 - 26.0 mmol/L    POCT Hemoglobin, Arterial 9.0 (L) 13.5 - 17.5 g/dL    POCT Anion Gap, Arterial 29 (H) 10 - 25 mmo/L    Patient Temperature 37.0 degrees Celsius    FiO2 100 %   Calcium, Ionized   Result Value Ref Range    POCT Calcium, Ionized 1.04 (L) 1.1 - 1.33 mmol/L   CBC   Result Value Ref Range    WBC 8.8 4.4 - 11.3 x10*3/uL    nRBC 1.6 (H) 0.0 - 0.0 /100 WBCs    RBC 3.02 (L) 4.50 - 5.90 x10*6/uL    Hemoglobin 9.0 (L) 13.5 - 17.5 g/dL    Hematocrit 25.4 (L) 41.0 - 52.0 %    MCV 84 80 - 100 fL    MCH 29.8 26.0 - 34.0 pg    MCHC 35.4 32.0 - 36.0 g/dL    RDW 15.0 (H) 11.5 - 14.5 %    Platelets 90 (L) 150 - 450 x10*3/uL   Magnesium   Result Value Ref Range    Magnesium 2.03 1.60 - 2.40 mg/dL   Renal Function Panel   Result Value Ref Range    Glucose 215 (H) 74 - 99 mg/dL    Sodium 143 136 - 145 mmol/L    Potassium 5.0 3.5 - 5.3 mmol/L    Chloride 98 98 - 107 mmol/L    Bicarbonate 14 (L) 21 - 32 mmol/L    Anion Gap 36 (H) 10 - 20 mmol/L    Urea Nitrogen 33 (H) 6 - 23 mg/dL    Creatinine 3.23 (H) 0.50 - 1.30 mg/dL    eGFR 21 (L) >60 mL/min/1.73m*2    Calcium 8.4 (L)  8.6 - 10.6 mg/dL    Phosphorus 8.2 (H) 2.5 - 4.9 mg/dL    Albumin 3.0 (L) 3.4 - 5.0 g/dL   Creatine Kinase   Result Value Ref Range    Creatine Kinase 3,922 (H) 0 - 325 U/L   Blood Gas Arterial Full Panel   Result Value Ref Range    POCT pH, Arterial 7.22 (LL) 7.38 - 7.42 pH    POCT pCO2, Arterial 33 (L) 38 - 42 mm Hg    POCT pO2, Arterial 96 (H) 85 - 95 mm Hg    POCT SO2, Arterial 99 94 - 100 %    POCT Oxy Hemoglobin, Arterial 97.5 94.0 - 98.0 %    POCT Hematocrit Calculated, Arterial 27.0 (L) 41.0 - 52.0 %    POCT Sodium, Arterial 138 136 - 145 mmol/L    POCT Potassium, Arterial 5.2 3.5 - 5.3 mmol/L    POCT Chloride, Arterial 99 98 - 107 mmol/L    POCT Ionized Calcium, Arterial 1.09 (L) 1.10 - 1.33 mmol/L    POCT Glucose, Arterial 218 (H) 74 - 99 mg/dL    POCT Lactate, Arterial 15.4 (HH) 0.4 - 2.0 mmol/L    POCT Base Excess, Arterial -13.1 (L) -2.0 - 3.0 mmol/L    POCT HCO3 Calculated, Arterial 13.5 (L) 22.0 - 26.0 mmol/L    POCT Hemoglobin, Arterial 9.0 (L) 13.5 - 17.5 g/dL    POCT Anion Gap, Arterial 31 (H) 10 - 25 mmo/L    Patient Temperature 37.0 degrees Celsius    FiO2 100 %   TEG Clot Global Profile   Result Value Ref Range    R (Reaction Time) K 8.8 4.6 - 9.1 min    K (Clot Kinetics) 1.4 0.8 - 2.1 min    ANGLE 72.0 63.0 - 78.0 deg    MA (Max Amplitude) K 63.0 52.0 - 69.0 mm    R (Reaction Time) KH 7.7 4.3 - 8.3 min    MA (Max Amplitude) RT 62.0 52.0 - 70.0 mm    MA ( Ramiro Amplitude) FF 22.0 15.0 - 32.0 mm    FLEV 409 278 - 581 mg/dL   Blood Gas Arterial Full Panel   Result Value Ref Range    POCT pH, Arterial 7.21 (LL) 7.38 - 7.42 pH    POCT pCO2, Arterial 35 (L) 38 - 42 mm Hg    POCT pO2, Arterial 94 85 - 95 mm Hg    POCT SO2, Arterial 98 94 - 100 %    POCT Oxy Hemoglobin, Arterial 96.0 94.0 - 98.0 %    POCT Hematocrit Calculated, Arterial 25.0 (L) 41.0 - 52.0 %    POCT Sodium, Arterial 139 136 - 145 mmol/L    POCT Potassium, Arterial 5.0 3.5 - 5.3 mmol/L    POCT Chloride, Arterial 98 98 - 107 mmol/L     POCT Ionized Calcium, Arterial 1.05 (L) 1.10 - 1.33 mmol/L    POCT Glucose, Arterial 204 (H) 74 - 99 mg/dL    POCT Lactate, Arterial 15.1 (HH) 0.4 - 2.0 mmol/L    POCT Base Excess, Arterial -12.8 (L) -2.0 - 3.0 mmol/L    POCT HCO3 Calculated, Arterial 14.0 (L) 22.0 - 26.0 mmol/L    POCT Hemoglobin, Arterial 8.4 (L) 13.5 - 17.5 g/dL    POCT Anion Gap, Arterial 32 (H) 10 - 25 mmo/L    Patient Temperature 37.0 degrees Celsius    FiO2 100 %   Blood Gas Arterial Full Panel   Result Value Ref Range    POCT pH, Arterial 7.19 (LL) 7.38 - 7.42 pH    POCT pCO2, Arterial 36 (L) 38 - 42 mm Hg    POCT pO2, Arterial 99 (H) 85 - 95 mm Hg    POCT SO2, Arterial 100 94 - 100 %    POCT Oxy Hemoglobin, Arterial 97.1 94.0 - 98.0 %    POCT Hematocrit Calculated, Arterial 26.0 (L) 41.0 - 52.0 %    POCT Sodium, Arterial 136 136 - 145 mmol/L    POCT Potassium, Arterial 5.2 3.5 - 5.3 mmol/L    POCT Chloride, Arterial 100 98 - 107 mmol/L    POCT Ionized Calcium, Arterial 1.14 1.10 - 1.33 mmol/L    POCT Glucose, Arterial 214 (H) 74 - 99 mg/dL    POCT Lactate, Arterial 16.0 (HH) 0.4 - 2.0 mmol/L    POCT Base Excess, Arterial -13.4 (L) -2.0 - 3.0 mmol/L    POCT HCO3 Calculated, Arterial 13.8 (L) 22.0 - 26.0 mmol/L    POCT Hemoglobin, Arterial 8.5 (L) 13.5 - 17.5 g/dL    POCT Anion Gap, Arterial 27 (H) 10 - 25 mmo/L    Patient Temperature 37.0 degrees Celsius    FiO2 100 %   POCT GLUCOSE   Result Value Ref Range    POCT Glucose 196 (H) 74 - 99 mg/dL   Blood Gas Lactic Acid, Venous   Result Value Ref Range    POCT Lactate, Venous 15.0 (HH) 0.4 - 2.0 mmol/L   Blood Gas Venous Full Panel   Result Value Ref Range    POCT pH, Venous 7.19 (LL) 7.33 - 7.43 pH    POCT pCO2, Venous 38 (L) 41 - 51 mm Hg    POCT pO2, Venous 54 (H) 35 - 45 mm Hg    POCT SO2, Venous 84 (H) 45 - 75 %    POCT Oxy Hemoglobin, Venous 81.6 (H) 45.0 - 75.0 %    POCT Hematocrit Calculated, Venous 25.0 (L) 41.0 - 52.0 %    POCT Sodium, Venous 137 136 - 145 mmol/L    POCT Potassium,  Venous 5.1 3.5 - 5.3 mmol/L    POCT Chloride, Venous 99 98 - 107 mmol/L    POCT Ionized Calicum, Venous 1.12 1.10 - 1.33 mmol/L    POCT Glucose, Venous 204 (H) 74 - 99 mg/dL    POCT Lactate, Venous 15.0 (HH) 0.4 - 2.0 mmol/L    POCT Base Excess, Venous -12.7 (L) -2.0 - 3.0 mmol/L    POCT HCO3 Calculated, Venous 14.5 (L) 22.0 - 26.0 mmol/L    POCT Hemoglobin, Venous 8.4 (L) 13.5 - 17.5 g/dL    POCT Anion Gap, Venous 29.0 (H) 10.0 - 25.0 mmol/L    Patient Temperature 37.0 degrees Celsius    FiO2 100 %     Current Medications  Scheduled medications  calcium chloride, 1 g, intravenous, Once  hydrocortisone sodium succinate, 50 mg, intravenous, q6h  oxygen, , inhalation, Continuous - Inhalation  pantoprazole, 40 mg, intravenous, Daily  piperacillin-tazobactam, 3.375 g, intravenous, q6h  sulfur hexafluoride microsphr, 2 mL, intravenous, Once in imaging  vancomycin, 750 mg, intravenous, q12h      Continuous medications  angiotensin II (Giapreza) 2.5 mg in sodium chloride 0.9% 250 mL (0.01 mg/mL) infusion, 1.25-40 ng/kg/min, Last Rate: 40 ng/kg/min (03/22/24 0700)  EPINEPHrine, 0-2 mcg/kg/min  epoprostenol, 0.05 mcg/kg/min (Ideal), Last Rate: 0.05 mcg/kg/min (03/22/24 0227)  fentaNYL,  mcg/hr, Last Rate: 50 mcg/hr (03/22/24 0700)  insulin regular infusion for cardiac surgery, 0-15 Units/hr, Last Rate: 3.1 Units/hr (03/22/24 0800)  norepinephrine, 0.1-3 mcg/kg/min, Last Rate: 0.4 mcg/kg/min (03/22/24 0848)  norepinephrine, 0.1-3 mcg/kg/min  PrismaSol BGK 2/3.5, 2,400 mL/hr, Last Rate: 2,400 mL/hr (03/22/24 0907)  propofol, 5-20 mcg/kg/min, Last Rate: 20.026 mcg/kg/min (03/22/24 0944)  sodium bicarbonate, 125 mL/hr, Last Rate: 125 mL/hr (03/22/24 0700)  vasopressin, 0.01-0.06 Units/min, Last Rate: 0.06 Units/min (03/22/24 0700)      PRN medications  PRN medications: calcium gluconate, calcium gluconate, dextrose, dextrose, dextrose **OR** glucagon, glucagon, HYDROmorphone, insulin regular, magnesium sulfate, magnesium  sulfate, potassium chloride, potassium chloride, vancomycin     Assessment  63 y.o. 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 Hawkins and Dr. Smith on 3/5/24.  Post operative course was uncomplicated and patient was on RNF until 3/20 for prolonged bed rest to avoid hip flexion to maintain flap integrity.  Patient was on prophylactic lovenox while on bedrest. Per nursing report, patient was getting to the side of the bed today to get up and walk when he had sudden chest heaviness. Pre-cardiac arrest EKG with signs of anterior ischemia. Nursing was called by family because patient went unresponsive.  ACLS was immediately started. ROSC was achieved after 1 round of CPR, however patient quickly decompensated into PEA.  Airway was secured.  Multiple rounds of CPR were completed with intermittent ROSC prior to transport to [facility].  Patient arrived to [facility]ICU under SICU care with active CPR underway.  Primary concern for STEMI vs massive PE.  Bedside POCUS TTE with biventricular failure and dilated RV.  RV septal bowing. Decided to give TPA to treat massive PE.  After several minutes of CPR patient achieved  sustained ROSC.  See code documentation note for further details.     3/20: Now s/p CPR with ROSC after TPA, overnight started on heparin, epo, increased pressor requirements, increased swelling at flap site.   3/21: MTP transfusions. Return to OR for exploration of R Flap site now s/p Exploratin of right thigh wound evacuation of hematoma. Suture ligation of multiple bleeding vessel and several points in gluteal area with Dr. Smith.    Plan/Recommendations  S/p Exploratin of right thigh wound evacuation of hematoma. Suture ligation of multiple bleeding vessel and several points in gluteal area:  A/P:   - remains  critically ill in [facility]ICU on high dose pressors (Levo/Vaso/Epi/AT2)  - Maintain incisional wound vac to right gluteal/thigh area        ·  Settings: -75mmHg low continuous suction        ·  Notify plastic surgery with any concerns of leak or obstruction   - monitor right thigh for worsening s/s of active bleeding  - Continue ANDERSON drain care per nursing      ·  Strip drain tubing TID and PRN     ·  Monitor and record output q8h      ·  Keep drain sites C/D/I with daily drain dressing changes      ·  Notify plastics if ANDERSON drain output exceeds > 100 ml/hr in one drain or > 300 ml/hr collectively  - Operative findings: Actively Bleeding Gluteal Arterial Perforators, Generalized Oozing, healthy Flap, EBL 6.5L (3L of which was retained Hematoma)   - continue IV abx  - Appreciate remaining care per [facility]  - family updated at bedside  - Plastics to follow    Patient and plan discussed with Dr. Rosenbaum, PHILLIP-CNP   Plastic and Reconstructive Surgery   [place]  Pager #[phone]  Team phones: [phone]

## 2024-03-22 NOTE — CARE PLAN
The patient's goals for the shift include  remain free from harm    The clinical goals for the shift include pt will remain hemodynamically stable    Over the shift, the patient did not make progress toward the following goals. Barriers to progression include increasing/decreasing vasopressors and sedation throughout night. Recommendations to address these barriers include find cause of hemodynamic instability.

## 2024-03-22 NOTE — CONSULTS
"Jason Hollins   63 y.o.      Vitals:    03/22/24 0600   Weight: 129 kg (284 lb 2.8 oz)      MRN/Room: [id number]/05/05-A    Reason for consult: RUTH with anuria 2/2 HD instability iso cardiogenic shock   Requesting physician: Dr. Moreno     HPI:  Jason Hollins is a 63 y.o. 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 Hawkins and Dr. Smith on 3/5/24. On 3/20, he developed massive PE and was given TPA, taken to OR today in setting of increased swelling at flap site- hematoma for exploration and evacuation.      He is currently on levo, vaso, epi, angiotensin, epo with Bp of around 120/55. Intubated at 100% Fio2 with CXR showing evidence of congestion. Remains oligoanuric with 5L UOP x 24 hours.     Labs sig for cr of 4.07 (b.l cr 0.6), lactate 11.6, pH: 7.24, PCO2 38, K is WNL (4.7). Imaging sig for prominent interstitial markings in lungs    In The ER: BP (!) 131/41   Pulse (!) 114   Temp 36.8 °C (98.2 °F) (Bladder)   Resp (!) 27   Ht 1.778 m (5' 10\")   Wt 129 kg (284 lb 2.8 oz)   SpO2 100%   BMI 40.77 kg/m²      Past Medical History:   Diagnosis Date    Diabetes mellitus (CMS/HCC)     Hyperlipidemia     Morbid (severe) obesity due to excess calories (CMS/HCC) 05/31/2016    Severe obesity (BMI 35.0-39.9) with comorbidity      Past Surgical History:   Procedure Laterality Date    CT GUIDED PERCUTANEOUS BIOPSY MUSCLE  11/13/2023    CT GUIDED PERCUTANEOUS BIOPSY MUSCLE 11/13/2023 [facility] CT    OTHER SURGICAL HISTORY  05/31/2016    History Of Prior Surgery      Family History   Problem Relation Name Age of Onset    Coronary artery disease Mother      Diabetes Mother      Hypertension Mother      Cancer Father      Diabetes Sister      Diabetes Brother      Diabetes Other Grandmother      Social History     Socioeconomic History    " Marital status:      Spouse name: Not on file    Number of children: Not on file    Years of education: Not on file    Highest education level: Not on file   Occupational History    Not on file   Tobacco Use    Smoking status: Never    Smokeless tobacco: Never   Vaping Use    Vaping Use: Never used   Substance and Sexual Activity    Alcohol use: Never    Drug use: Never    Sexual activity: Defer   Other Topics Concern    Not on file   Social History Narrative    Not on file     Social Determinants of Health     Financial Resource Strain: Low Risk  (3/5/2024)    Overall Financial Resource Strain (CARDIA)     Difficulty of Paying Living Expenses: Not hard at all   Food Insecurity: Not on file   Transportation Needs: No Transportation Needs (3/5/2024)    PRAPARE - Transportation     Lack of Transportation (Medical): No     Lack of Transportation (Non-Medical): No   Physical Activity: Not on file   Stress: Not on file   Social Connections: Not on file   Intimate Partner Violence: Not on file   Housing Stability: Low Risk  (3/5/2024)    Housing Stability Vital Sign     Unable to Pay for Housing in the Last Year: No     Number of Places Lived in the Last Year: 1     Unstable Housing in the Last Year: No       No Known Allergies     Prior to Admission Medications   Prescriptions Last Dose Informant Patient Reported? Taking?   NIFEdipine ER (NIFEdipine XL) 30 mg 24 hr tablet Not Taking at discontinued2/29 Self No No   Sig: Take 1 tablet (30 mg) by mouth once daily in the morning. Take before meals. Do not crush, chew, or split.   Patient not taking: Reported on 3/5/2024   OneTouch Ultra Test strip Not Taking Self No No   Sig: Test blood sugar once daily   Patient not taking: Reported on 3/5/2024   aspirin 81 mg EC tablet Past Week Self Yes Yes   Sig: Take 1 tablet (81 mg) by mouth once daily. AS DIRECTED.   atorvastatin (Lipitor) 40 mg tablet 3/3/2024 at pm  No No   Sig: Take 1 tablet (40 mg) by mouth once daily.    blood sugar diagnostic (Blood Glucose Test) strip Past Week Self Yes Yes   Sig: once daily. One Drop Test In Vitro Strip; Test   chlorhexidine (Hibiclens) 4 % external liquid   No No   Sig: Apply topically 2 times a day for 5 days.   chlorhexidine (Peridex) 0.12 % solution 3/5/2024 Self No Yes   Sig: Use 15 mL in the mouth or throat see administration instructions for 2 doses. Use the night before and morning of surgery.   dulaglutide (TRULICITY) 4.5 mg/0.5 mL pen injector 2/24/2024 Self No Yes   Sig: INJECT ONE PEN (4.5 MG) UNDER THE SKIN ONCE WEEKLY   dulaglutide (Trulicity) 4.5 mg/0.5 mL pen injector 2/24/2024 Self No Yes   Sig: Inject 4.5 mg under the skin 1 (one) time per week.   ergocalciferol (Vitamin D-2) 1.25 MG (20269 UT) capsule 2/29/2024 Self No Yes   Sig: Take 1 capsule (50,000 Units) by mouth every 14 (fourteen) days. twice monthly on the 15th and the 30 th for vitamin d deficiency   fish oil concentrate (Omega-3) 120-180 mg capsule 3/3/2024 Self Yes Yes   Sig: Take 1 capsule (1 g) by mouth.   gabapentin (Neurontin) 300 mg capsule Past Month Self No Yes   Sig: Use the 100 mg capsule to titrate to 300 mg , then use the 300 mg capsule and take 1 to 2 capsules hs for pain   ibuprofen 800 mg tablet Past Month Self No Yes   Sig: Take 1 tablet (800 mg) by mouth 3 times a day as needed for mild pain (1 - 3) (pain).   metFORMIN (Glucophage) 1,000 mg tablet 3/4/2024 Self No Yes   Sig: TAKE 1 TABLET BY MOUTH EVERY MORNING AND 1.5 TABLETS BY MOUTH EVERY EVENING ,   multivitamin tablet 3/4/2024 Self Yes Yes   Sig: Take 1 tablet by mouth once daily.   sildenafil (Viagra) 100 mg tablet Not Taking Self No No   Sig: Take 1 tablet (100 mg) by mouth once daily as needed (1 hour before needed).   Patient not taking: Reported on 3/5/2024   tiZANidine (Zanaflex) 4 mg capsule Not Taking at pt requests refill Self No No   Sig: Take 1 capsule (4 mg) by mouth 3 times a day.   Patient not taking: Reported on 3/5/2024       Facility-Administered Medications: None        Meds:   calcium chloride, 1 g, Once  hydrocortisone sodium succinate, 50 mg, q6h  oxygen, , Continuous - Inhalation  pantoprazole, 40 mg, Daily  piperacillin-tazobactam, 3.375 g, q6h  rocuronium, 50 mg, Once  sulfur hexafluoride microsphr, 2 mL, Once in imaging  vancomycin, 750 mg, q12h      angiotensin II (Giapreza) 2.5 mg in sodium chloride 0.9% 250 mL (0.01 mg/mL) infusion, Last Rate: 39.934 ng/kg/min (03/22/24 1130)  cisatracurium  EPINEPHrine, Last Rate: 0.128 mcg/kg/min (03/22/24 1129)  epoprostenol, Last Rate: 0.05 mcg/kg/min (03/22/24 0227)  fentaNYL, Last Rate: 100 mcg/hr (03/22/24 1300)  insulin regular infusion for cardiac surgery, Last Rate: 3.9 Units/hr (03/22/24 1100)  lactated Ringer's, Last Rate: 5 mL/hr (03/22/24 0800)  lactated Ringer's, Last Rate: 5 mL/hr (03/22/24 0800)  norepinephrine, Last Rate: 0.511 mcg/kg/min (03/22/24 1455)  PrismaSol BGK 2/3.5, Last Rate: 2,400 mL/hr (03/22/24 0907)  propofol, Last Rate: 50 mcg/kg/min (03/22/24 1505)  sodium bicarbonate 250 mEq in dextrose 5 % in water (D5W) 500 mL (0.5 mEq/mL) infusion, Last Rate: 25 mEq/hr (03/22/24 1118)  vasopressin, Last Rate: 0.06 Units/min (03/22/24 1131)      calcium gluconate, 1 g, q6h PRN  calcium gluconate, 2 g, q6h PRN  dextrose, 12.5 g, q15 min PRN  dextrose, 25 g, q15 min PRN  dextrose, 25 g, q15 min PRN   Or  glucagon, 1 mg, q15 min PRN  glucagon, 1 mg, q15 min PRN  HYDROmorphone, 0.2 mg, q4h PRN  insulin regular, 0-15 Units, PRN  magnesium sulfate, 2 g, q6h PRN  magnesium sulfate, 4 g, q6h PRN  potassium chloride, 20 mEq, q6h PRN  potassium chloride, 40 mEq, q6h PRN  vancomycin, , Daily PRN        Vitals:    03/22/24 1400   BP:    Pulse: (!) 114   Resp: (!) 27   Temp:    SpO2: 100%        03/20 1900 - 03/22 0659  In: 59872.5 [I.V.:5363]  Out: 7270 [Urine:270; Drains:490]        General appearance: intubated and sedated, anasarca   Eyes: non-icteric  Skin: no apparent  rash  Heart: RRR, difficult to auscultate due to ventilation sounds but no murmurs heard.   Lungs: good airflow throughout, rhonchi noted   Abdomen: soft, non-distended   Extremities: edematous LE, warm to touch, 1+ pulses palpable on Dps bilaterally   : daniels in place   Neuro: spontaneously moves extremities, responds to sternal rub.   ACCESS: - right radial arterial line, left femoral trialysis, left subclavian central line, PIV    Blood Labs:  Results for orders placed or performed during the hospital encounter of 03/05/24 (from the past 24 hour(s))   Blood Gas Arterial Full Panel Unsolicited   Result Value Ref Range    POCT pH, Arterial 7.16 (LL) 7.38 - 7.42 pH    POCT pCO2, Arterial 50 (H) 38 - 42 mm Hg    POCT pO2, Arterial 62 (L) 85 - 95 mm Hg    POCT SO2, Arterial 95 94 - 100 %    POCT Oxy Hemoglobin, Arterial 92.6 (L) 94.0 - 98.0 %    POCT Hematocrit Calculated, Arterial 30.0 (L) 41.0 - 52.0 %    POCT Sodium, Arterial 140 136 - 145 mmol/L    POCT Potassium, Arterial 4.1 3.5 - 5.3 mmol/L    POCT Chloride, Arterial 104 98 - 107 mmol/L    POCT Ionized Calcium, Arterial 1.30 1.10 - 1.33 mmol/L    POCT Glucose, Arterial 183 (H) 74 - 99 mg/dL    POCT Lactate, Arterial 10.1 (HH) 0.4 - 2.0 mmol/L    POCT Base Excess, Arterial -10.6 (L) -2.0 - 3.0 mmol/L    POCT HCO3 Calculated, Arterial 17.8 (L) 22.0 - 26.0 mmol/L    POCT Hemoglobin, Arterial 10.0 (L) 13.5 - 17.5 g/dL    POCT Anion Gap, Arterial 22 10 - 25 mmo/L    Patient Temperature 37.0 degrees Celsius    FiO2 100 %   Blood Gas Arterial Full Panel Unsolicited   Result Value Ref Range    POCT pH, Arterial 7.23 (LL) 7.38 - 7.42 pH    POCT pCO2, Arterial 45 (H) 38 - 42 mm Hg    POCT pO2, Arterial 62 (L) 85 - 95 mm Hg    POCT SO2, Arterial 96 94 - 100 %    POCT Oxy Hemoglobin, Arterial 93.0 (L) 94.0 - 98.0 %    POCT Hematocrit Calculated, Arterial 29.0 (L) 41.0 - 52.0 %    POCT Sodium, Arterial 141 136 - 145 mmol/L    POCT Potassium, Arterial 4.1 3.5 - 5.3 mmol/L     POCT Chloride, Arterial 104 98 - 107 mmol/L    POCT Ionized Calcium, Arterial 1.28 1.10 - 1.33 mmol/L    POCT Glucose, Arterial 171 (H) 74 - 99 mg/dL    POCT Lactate, Arterial 9.9 (HH) 0.4 - 2.0 mmol/L    POCT Base Excess, Arterial -8.4 (L) -2.0 - 3.0 mmol/L    POCT HCO3 Calculated, Arterial 18.8 (L) 22.0 - 26.0 mmol/L    POCT Hemoglobin, Arterial 9.7 (L) 13.5 - 17.5 g/dL    POCT Anion Gap, Arterial 22 10 - 25 mmo/L    Patient Temperature 37.0 degrees Celsius    FiO2 100 %   Blood Gas Arterial Full Panel Unsolicited   Result Value Ref Range    POCT pH, Arterial 7.25 (LL) 7.38 - 7.42 pH    POCT pCO2, Arterial 45 (H) 38 - 42 mm Hg    POCT pO2, Arterial 60 (L) 85 - 95 mm Hg    POCT SO2, Arterial 95 94 - 100 %    POCT Oxy Hemoglobin, Arterial 92.1 (L) 94.0 - 98.0 %    POCT Hematocrit Calculated, Arterial 29.0 (L) 41.0 - 52.0 %    POCT Sodium, Arterial 140 136 - 145 mmol/L    POCT Potassium, Arterial 4.4 3.5 - 5.3 mmol/L    POCT Chloride, Arterial 103 98 - 107 mmol/L    POCT Ionized Calcium, Arterial 1.15 1.10 - 1.33 mmol/L    POCT Glucose, Arterial 172 (H) 74 - 99 mg/dL    POCT Lactate, Arterial 9.7 (HH) 0.4 - 2.0 mmol/L    POCT Base Excess, Arterial -7.2 (L) -2.0 - 3.0 mmol/L    POCT HCO3 Calculated, Arterial 19.7 (L) 22.0 - 26.0 mmol/L    POCT Hemoglobin, Arterial 9.7 (L) 13.5 - 17.5 g/dL    POCT Anion Gap, Arterial 22 10 - 25 mmo/L    Patient Temperature 37.0 degrees Celsius    FiO2 100 %   Blood Gas Arterial Full Panel Unsolicited   Result Value Ref Range    POCT pH, Arterial 7.29 (L) 7.38 - 7.42 pH    POCT pCO2, Arterial 47 (H) 38 - 42 mm Hg    POCT pO2, Arterial 60 (L) 85 - 95 mm Hg    POCT SO2, Arterial 94 94 - 100 %    POCT Oxy Hemoglobin, Arterial 91.9 (L) 94.0 - 98.0 %    POCT Hematocrit Calculated, Arterial 29.0 (L) 41.0 - 52.0 %    POCT Sodium, Arterial 140 136 - 145 mmol/L    POCT Potassium, Arterial 4.5 3.5 - 5.3 mmol/L    POCT Chloride, Arterial 102 98 - 107 mmol/L    POCT Ionized Calcium, Arterial  1.27 1.10 - 1.33 mmol/L    POCT Glucose, Arterial 174 (H) 74 - 99 mg/dL    POCT Lactate, Arterial 10.9 (HH) 0.4 - 2.0 mmol/L    POCT Base Excess, Arterial -4.0 (L) -2.0 - 3.0 mmol/L    POCT HCO3 Calculated, Arterial 22.6 22.0 - 26.0 mmol/L    POCT Hemoglobin, Arterial 9.7 (L) 13.5 - 17.5 g/dL    POCT Anion Gap, Arterial 20 10 - 25 mmo/L    Patient Temperature 37.0 degrees Celsius    FiO2 100 %   Coox Panel, Arterial Unsolicited   Result Value Ref Range    POCT Hemoglobin, Arterial 9.7 (L) 13.5 - 17.5 g/dL    POCT Oxy Hemoglobin, Arterial 91.9 (L) 94.0 - 98.0 %    POCT Carboxyhemoglobin, Arterial 1.7 %    POCT Methemoglobin, Arterial 0.9 0.0 - 1.5 %    POCT Deoxy Hemoglobin, Arterial 5.5 (H) 0.0 - 5.0 %   TEG Clot Global Profile   Result Value Ref Range    R (Reaction Time) K 12.4 (H) 4.6 - 9.1 min    K (Clot Kinetics) >5.0 (H) 0.8 - 2.1 min    ANGLE <39.0 (L) 63.0 - 78.0 deg    MA (Max Amplitude) K 54.0 52.0 - 69.0 mm    R (Reaction Time) KH 11.0 (H) 4.3 - 8.3 min    MA (Max Amplitude) RT 54.0 52.0 - 70.0 mm    MA ( Ramiro Amplitude) FF 18.0 15.0 - 32.0 mm    FLEV 325 278 - 581 mg/dL   Fibrinogen   Result Value Ref Range    Fibrinogen 234 200 - 400 mg/dL   Renal Function Panel   Result Value Ref Range    Glucose 158 (H) 74 - 99 mg/dL    Sodium 145 136 - 145 mmol/L    Potassium 4.5 3.5 - 5.3 mmol/L    Chloride 100 98 - 107 mmol/L    Bicarbonate 23 21 - 32 mmol/L    Anion Gap 27 (H) 10 - 20 mmol/L    Urea Nitrogen 28 (H) 6 - 23 mg/dL    Creatinine 2.64 (H) 0.50 - 1.30 mg/dL    eGFR 27 (L) >60 mL/min/1.73m*2    Calcium 9.6 8.6 - 10.6 mg/dL    Phosphorus 8.7 (H) 2.5 - 4.9 mg/dL    Albumin 3.3 (L) 3.4 - 5.0 g/dL   Magnesium   Result Value Ref Range    Magnesium 1.88 1.60 - 2.40 mg/dL   Coagulation Screen   Result Value Ref Range    Protime 16.7 (H) 9.8 - 12.8 seconds    INR 1.5 (H) 0.9 - 1.1    aPTT 37 27 - 38 seconds   CBC   Result Value Ref Range    WBC 5.3 4.4 - 11.3 x10*3/uL    nRBC 3.2 (H) 0.0 - 0.0 /100 WBCs    RBC  3.36 (L) 4.50 - 5.90 x10*6/uL    Hemoglobin 9.7 (L) 13.5 - 17.5 g/dL    Hematocrit 28.1 (L) 41.0 - 52.0 %    MCV 84 80 - 100 fL    MCH 28.9 26.0 - 34.0 pg    MCHC 34.5 32.0 - 36.0 g/dL    RDW 13.8 11.5 - 14.5 %    Platelets 64 (L) 150 - 450 x10*3/uL   Calcium, Ionized   Result Value Ref Range    POCT Calcium, Ionized 1.21 1.1 - 1.33 mmol/L   Blood Gas Arterial Full Panel   Result Value Ref Range    POCT pH, Arterial 7.29 (L) 7.38 - 7.42 pH    POCT pCO2, Arterial 43 (H) 38 - 42 mm Hg    POCT pO2, Arterial 67 (L) 85 - 95 mm Hg    POCT SO2, Arterial 97 94 - 100 %    POCT Oxy Hemoglobin, Arterial 94.1 94.0 - 98.0 %    POCT Hematocrit Calculated, Arterial 30.0 (L) 41.0 - 52.0 %    POCT Sodium, Arterial 141 136 - 145 mmol/L    POCT Potassium, Arterial 4.5 3.5 - 5.3 mmol/L    POCT Chloride, Arterial 101 98 - 107 mmol/L    POCT Ionized Calcium, Arterial 1.24 1.10 - 1.33 mmol/L    POCT Glucose, Arterial 148 (H) 74 - 99 mg/dL    POCT Lactate, Arterial 9.1 (HH) 0.4 - 2.0 mmol/L    POCT Base Excess, Arterial -5.6 (L) -2.0 - 3.0 mmol/L    POCT HCO3 Calculated, Arterial 20.7 (L) 22.0 - 26.0 mmol/L    POCT Hemoglobin, Arterial 9.9 (L) 13.5 - 17.5 g/dL    POCT Anion Gap, Arterial 24 10 - 25 mmo/L    Patient Temperature 37.0 degrees Celsius    FiO2 100 %   POCT GLUCOSE   Result Value Ref Range    POCT Glucose 147 (H) 74 - 99 mg/dL   POCT GLUCOSE   Result Value Ref Range    POCT Glucose 152 (H) 74 - 99 mg/dL   Blood Gas Arterial Full Panel   Result Value Ref Range    POCT pH, Arterial 7.25 (LL) 7.38 - 7.42 pH    POCT pCO2, Arterial 43 (H) 38 - 42 mm Hg    POCT pO2, Arterial 64 (L) 85 - 95 mm Hg    POCT SO2, Arterial 94 94 - 100 %    POCT Oxy Hemoglobin, Arterial 91.7 (L) 94.0 - 98.0 %    POCT Hematocrit Calculated, Arterial 29.0 (L) 41.0 - 52.0 %    POCT Sodium, Arterial 141 136 - 145 mmol/L    POCT Potassium, Arterial 5.0 3.5 - 5.3 mmol/L    POCT Chloride, Arterial 101 98 - 107 mmol/L    POCT Ionized Calcium, Arterial 1.18 1.10 -  1.33 mmol/L    POCT Glucose, Arterial 151 (H) 74 - 99 mg/dL    POCT Lactate, Arterial 10.3 (HH) 0.4 - 2.0 mmol/L    POCT Base Excess, Arterial -7.9 (L) -2.0 - 3.0 mmol/L    POCT HCO3 Calculated, Arterial 18.9 (L) 22.0 - 26.0 mmol/L    POCT Hemoglobin, Arterial 9.7 (L) 13.5 - 17.5 g/dL    POCT Anion Gap, Arterial 26 (H) 10 - 25 mmo/L    Patient Temperature 37.0 degrees Celsius    FiO2 100 %   Prepare Plasma: 1 Units   Result Value Ref Range    PRODUCT CODE P0403O92     Unit Number [id number]     Unit ABO O     Unit RH POS     Dispense Status TR     Blood Expiration Date March 26, 2024 12:36 EDT     PRODUCT BLOOD TYPE 5100     UNIT VOLUME 307    Prepare Cryoprecipitated AHF (Pooled Units): 1 Pools   Result Value Ref Range    PRODUCT CODE U5318L01     Unit Number [id number]     Unit ABO O     Unit RH POS     Dispense Status TR     Blood Expiration Date March 22, 2024 05:40 EDT     PRODUCT BLOOD TYPE 5100     UNIT VOLUME 65    Prepare Cryoprecipitated AHF (Pooled Units): 1 Pools   Result Value Ref Range    PRODUCT CODE E3126I81     Unit Number [id number]     Unit ABO O     Unit RH POS     Dispense Status TR     Blood Expiration Date March 22, 2024 04:31 EDT     PRODUCT BLOOD TYPE 5100     UNIT VOLUME 84    Blood Gas Arterial Full Panel   Result Value Ref Range    POCT pH, Arterial 7.20 (LL) 7.38 - 7.42 pH    POCT pCO2, Arterial 42 38 - 42 mm Hg    POCT pO2, Arterial 70 (L) 85 - 95 mm Hg    POCT SO2, Arterial 95 94 - 100 %    POCT Oxy Hemoglobin, Arterial 92.8 (L) 94.0 - 98.0 %    POCT Hematocrit Calculated, Arterial 29.0 (L) 41.0 - 52.0 %    POCT Sodium, Arterial 141 136 - 145 mmol/L    POCT Potassium, Arterial 5.5 (H) 3.5 - 5.3 mmol/L    POCT Chloride, Arterial 100 98 - 107 mmol/L    POCT Ionized Calcium, Arterial 1.14 1.10 - 1.33 mmol/L    POCT Glucose, Arterial 150 (H) 74 - 99 mg/dL    POCT Lactate, Arterial 13.0 (HH) 0.4 - 2.0 mmol/L    POCT Base Excess, Arterial -11.0 (L) -2.0 - 3.0 mmol/L    POCT  HCO3 Calculated, Arterial 16.4 (L) 22.0 - 26.0 mmol/L    POCT Hemoglobin, Arterial 9.6 (L) 13.5 - 17.5 g/dL    POCT Anion Gap, Arterial 30 (H) 10 - 25 mmo/L    Patient Temperature 37.0 degrees Celsius    FiO2 100 %   CBC   Result Value Ref Range    WBC 7.6 4.4 - 11.3 x10*3/uL    nRBC 1.3 (H) 0.0 - 0.0 /100 WBCs    RBC 3.23 (L) 4.50 - 5.90 x10*6/uL    Hemoglobin 9.6 (L) 13.5 - 17.5 g/dL    Hematocrit 27.1 (L) 41.0 - 52.0 %    MCV 84 80 - 100 fL    MCH 29.7 26.0 - 34.0 pg    MCHC 35.4 32.0 - 36.0 g/dL    RDW 14.6 (H) 11.5 - 14.5 %    Platelets 78 (L) 150 - 450 x10*3/uL   Calcium, Ionized   Result Value Ref Range    POCT Calcium, Ionized 1.09 (L) 1.1 - 1.33 mmol/L   Coagulation Screen   Result Value Ref Range    Protime 18.4 (H) 9.8 - 12.8 seconds    INR 1.6 (H) 0.9 - 1.1    aPTT 37 27 - 38 seconds   Magnesium   Result Value Ref Range    Magnesium 2.15 1.60 - 2.40 mg/dL   Renal Function Panel   Result Value Ref Range    Glucose 154 (H) 74 - 99 mg/dL    Sodium 144 136 - 145 mmol/L    Potassium 5.2 3.5 - 5.3 mmol/L    Chloride 99 98 - 107 mmol/L    Bicarbonate 18 (L) 21 - 32 mmol/L    Anion Gap 32 (H) 10 - 20 mmol/L    Urea Nitrogen 34 (H) 6 - 23 mg/dL    Creatinine 3.14 (H) 0.50 - 1.30 mg/dL    eGFR 22 (L) >60 mL/min/1.73m*2    Calcium 9.1 8.6 - 10.6 mg/dL    Phosphorus 9.8 (H) 2.5 - 4.9 mg/dL    Albumin 3.3 (L) 3.4 - 5.0 g/dL   Hepatic function panel   Result Value Ref Range    Albumin 3.3 (L) 3.4 - 5.0 g/dL    Bilirubin, Total 4.0 (H) 0.0 - 1.2 mg/dL    Bilirubin, Direct 2.8 (H) 0.0 - 0.3 mg/dL    Alkaline Phosphatase 68 33 - 136 U/L    ALT 1,633 (H) 10 - 52 U/L    AST 3,627 (H) 9 - 39 U/L    Total Protein 4.9 (L) 6.4 - 8.2 g/dL   Amylase   Result Value Ref Range    Amylase 600 (H) 29 - 103 U/L   Lipase   Result Value Ref Range    Lipase 1,348 (H) 9 - 82 U/L   Blood Gas Arterial Full Panel   Result Value Ref Range    POCT pH, Arterial 7.16 (LL) 7.38 - 7.42 pH    POCT pCO2, Arterial 44 (H) 38 - 42 mm Hg    POCT pO2,  Arterial 68 (L) 85 - 95 mm Hg    POCT SO2, Arterial 95 94 - 100 %    POCT Oxy Hemoglobin, Arterial 92.7 (L) 94.0 - 98.0 %    POCT Hematocrit Calculated, Arterial 27.0 (L) 41.0 - 52.0 %    POCT Sodium, Arterial 140 136 - 145 mmol/L    POCT Potassium, Arterial 5.2 3.5 - 5.3 mmol/L    POCT Chloride, Arterial 100 98 - 107 mmol/L    POCT Ionized Calcium, Arterial 1.11 1.10 - 1.33 mmol/L    POCT Glucose, Arterial 195 (H) 74 - 99 mg/dL    POCT Lactate, Arterial 14.6 (HH) 0.4 - 2.0 mmol/L    POCT Base Excess, Arterial -12.3 (L) -2.0 - 3.0 mmol/L    POCT HCO3 Calculated, Arterial 15.7 (L) 22.0 - 26.0 mmol/L    POCT Hemoglobin, Arterial 9.0 (L) 13.5 - 17.5 g/dL    POCT Anion Gap, Arterial 30 (H) 10 - 25 mmo/L    Patient Temperature 37.0 degrees Celsius    FiO2 100 %   POCT GLUCOSE   Result Value Ref Range    POCT Glucose 154 (H) 74 - 99 mg/dL   Blood Gas Arterial Full Panel   Result Value Ref Range    POCT pH, Arterial 7.22 (LL) 7.38 - 7.42 pH    POCT pCO2, Arterial 37 (L) 38 - 42 mm Hg    POCT pO2, Arterial 75 (L) 85 - 95 mm Hg    POCT SO2, Arterial 97 94 - 100 %    POCT Oxy Hemoglobin, Arterial 94.9 94.0 - 98.0 %    POCT Hematocrit Calculated, Arterial 27.0 (L) 41.0 - 52.0 %    POCT Sodium, Arterial 139 136 - 145 mmol/L    POCT Potassium, Arterial 5.1 3.5 - 5.3 mmol/L    POCT Chloride, Arterial 100 98 - 107 mmol/L    POCT Ionized Calcium, Arterial 1.09 (L) 1.10 - 1.33 mmol/L    POCT Glucose, Arterial 188 (H) 74 - 99 mg/dL    POCT Lactate, Arterial 14.5 (HH) 0.4 - 2.0 mmol/L    POCT Base Excess, Arterial -11.7 (L) -2.0 - 3.0 mmol/L    POCT HCO3 Calculated, Arterial 15.1 (L) 22.0 - 26.0 mmol/L    POCT Hemoglobin, Arterial 9.0 (L) 13.5 - 17.5 g/dL    POCT Anion Gap, Arterial 29 (H) 10 - 25 mmo/L    Patient Temperature 37.0 degrees Celsius    FiO2 100 %   Calcium, Ionized   Result Value Ref Range    POCT Calcium, Ionized 1.04 (L) 1.1 - 1.33 mmol/L   CBC   Result Value Ref Range    WBC 8.8 4.4 - 11.3 x10*3/uL    nRBC 1.6 (H) 0.0  - 0.0 /100 WBCs    RBC 3.02 (L) 4.50 - 5.90 x10*6/uL    Hemoglobin 9.0 (L) 13.5 - 17.5 g/dL    Hematocrit 25.4 (L) 41.0 - 52.0 %    MCV 84 80 - 100 fL    MCH 29.8 26.0 - 34.0 pg    MCHC 35.4 32.0 - 36.0 g/dL    RDW 15.0 (H) 11.5 - 14.5 %    Platelets 90 (L) 150 - 450 x10*3/uL   Magnesium   Result Value Ref Range    Magnesium 2.03 1.60 - 2.40 mg/dL   Renal Function Panel   Result Value Ref Range    Glucose 215 (H) 74 - 99 mg/dL    Sodium 143 136 - 145 mmol/L    Potassium 5.0 3.5 - 5.3 mmol/L    Chloride 98 98 - 107 mmol/L    Bicarbonate 14 (L) 21 - 32 mmol/L    Anion Gap 36 (H) 10 - 20 mmol/L    Urea Nitrogen 33 (H) 6 - 23 mg/dL    Creatinine 3.23 (H) 0.50 - 1.30 mg/dL    eGFR 21 (L) >60 mL/min/1.73m*2    Calcium 8.4 (L) 8.6 - 10.6 mg/dL    Phosphorus 8.2 (H) 2.5 - 4.9 mg/dL    Albumin 3.0 (L) 3.4 - 5.0 g/dL   Creatine Kinase   Result Value Ref Range    Creatine Kinase 3,922 (H) 0 - 325 U/L   Blood Gas Arterial Full Panel   Result Value Ref Range    POCT pH, Arterial 7.22 (LL) 7.38 - 7.42 pH    POCT pCO2, Arterial 33 (L) 38 - 42 mm Hg    POCT pO2, Arterial 96 (H) 85 - 95 mm Hg    POCT SO2, Arterial 99 94 - 100 %    POCT Oxy Hemoglobin, Arterial 97.5 94.0 - 98.0 %    POCT Hematocrit Calculated, Arterial 27.0 (L) 41.0 - 52.0 %    POCT Sodium, Arterial 138 136 - 145 mmol/L    POCT Potassium, Arterial 5.2 3.5 - 5.3 mmol/L    POCT Chloride, Arterial 99 98 - 107 mmol/L    POCT Ionized Calcium, Arterial 1.09 (L) 1.10 - 1.33 mmol/L    POCT Glucose, Arterial 218 (H) 74 - 99 mg/dL    POCT Lactate, Arterial 15.4 (HH) 0.4 - 2.0 mmol/L    POCT Base Excess, Arterial -13.1 (L) -2.0 - 3.0 mmol/L    POCT HCO3 Calculated, Arterial 13.5 (L) 22.0 - 26.0 mmol/L    POCT Hemoglobin, Arterial 9.0 (L) 13.5 - 17.5 g/dL    POCT Anion Gap, Arterial 31 (H) 10 - 25 mmo/L    Patient Temperature 37.0 degrees Celsius    FiO2 100 %   TEG Clot Global Profile   Result Value Ref Range    R (Reaction Time) K 8.8 4.6 - 9.1 min    K (Clot Kinetics) 1.4 0.8  - 2.1 min    ANGLE 72.0 63.0 - 78.0 deg    MA (Max Amplitude) K 63.0 52.0 - 69.0 mm    R (Reaction Time) KH 7.7 4.3 - 8.3 min    MA (Max Amplitude) RT 62.0 52.0 - 70.0 mm    MA ( Ramiro Amplitude) FF 22.0 15.0 - 32.0 mm    FLEV 409 278 - 581 mg/dL   Blood Gas Arterial Full Panel   Result Value Ref Range    POCT pH, Arterial 7.21 (LL) 7.38 - 7.42 pH    POCT pCO2, Arterial 35 (L) 38 - 42 mm Hg    POCT pO2, Arterial 94 85 - 95 mm Hg    POCT SO2, Arterial 98 94 - 100 %    POCT Oxy Hemoglobin, Arterial 96.0 94.0 - 98.0 %    POCT Hematocrit Calculated, Arterial 25.0 (L) 41.0 - 52.0 %    POCT Sodium, Arterial 139 136 - 145 mmol/L    POCT Potassium, Arterial 5.0 3.5 - 5.3 mmol/L    POCT Chloride, Arterial 98 98 - 107 mmol/L    POCT Ionized Calcium, Arterial 1.05 (L) 1.10 - 1.33 mmol/L    POCT Glucose, Arterial 204 (H) 74 - 99 mg/dL    POCT Lactate, Arterial 15.1 (HH) 0.4 - 2.0 mmol/L    POCT Base Excess, Arterial -12.8 (L) -2.0 - 3.0 mmol/L    POCT HCO3 Calculated, Arterial 14.0 (L) 22.0 - 26.0 mmol/L    POCT Hemoglobin, Arterial 8.4 (L) 13.5 - 17.5 g/dL    POCT Anion Gap, Arterial 32 (H) 10 - 25 mmo/L    Patient Temperature 37.0 degrees Celsius    FiO2 100 %   Blood Gas Arterial Full Panel   Result Value Ref Range    POCT pH, Arterial 7.19 (LL) 7.38 - 7.42 pH    POCT pCO2, Arterial 36 (L) 38 - 42 mm Hg    POCT pO2, Arterial 99 (H) 85 - 95 mm Hg    POCT SO2, Arterial 100 94 - 100 %    POCT Oxy Hemoglobin, Arterial 97.1 94.0 - 98.0 %    POCT Hematocrit Calculated, Arterial 26.0 (L) 41.0 - 52.0 %    POCT Sodium, Arterial 136 136 - 145 mmol/L    POCT Potassium, Arterial 5.2 3.5 - 5.3 mmol/L    POCT Chloride, Arterial 100 98 - 107 mmol/L    POCT Ionized Calcium, Arterial 1.14 1.10 - 1.33 mmol/L    POCT Glucose, Arterial 214 (H) 74 - 99 mg/dL    POCT Lactate, Arterial 16.0 (HH) 0.4 - 2.0 mmol/L    POCT Base Excess, Arterial -13.4 (L) -2.0 - 3.0 mmol/L    POCT HCO3 Calculated, Arterial 13.8 (L) 22.0 - 26.0 mmol/L    POCT Hemoglobin,  Arterial 8.5 (L) 13.5 - 17.5 g/dL    POCT Anion Gap, Arterial 27 (H) 10 - 25 mmo/L    Patient Temperature 37.0 degrees Celsius    FiO2 100 %   POCT GLUCOSE   Result Value Ref Range    POCT Glucose 196 (H) 74 - 99 mg/dL   Blood Gas Lactic Acid, Venous   Result Value Ref Range    POCT Lactate, Venous 15.0 (HH) 0.4 - 2.0 mmol/L   Blood Gas Venous Full Panel   Result Value Ref Range    POCT pH, Venous 7.19 (LL) 7.33 - 7.43 pH    POCT pCO2, Venous 38 (L) 41 - 51 mm Hg    POCT pO2, Venous 54 (H) 35 - 45 mm Hg    POCT SO2, Venous 84 (H) 45 - 75 %    POCT Oxy Hemoglobin, Venous 81.6 (H) 45.0 - 75.0 %    POCT Hematocrit Calculated, Venous 25.0 (L) 41.0 - 52.0 %    POCT Sodium, Venous 137 136 - 145 mmol/L    POCT Potassium, Venous 5.1 3.5 - 5.3 mmol/L    POCT Chloride, Venous 99 98 - 107 mmol/L    POCT Ionized Calicum, Venous 1.12 1.10 - 1.33 mmol/L    POCT Glucose, Venous 204 (H) 74 - 99 mg/dL    POCT Lactate, Venous 15.0 (HH) 0.4 - 2.0 mmol/L    POCT Base Excess, Venous -12.7 (L) -2.0 - 3.0 mmol/L    POCT HCO3 Calculated, Venous 14.5 (L) 22.0 - 26.0 mmol/L    POCT Hemoglobin, Venous 8.4 (L) 13.5 - 17.5 g/dL    POCT Anion Gap, Venous 29.0 (H) 10.0 - 25.0 mmol/L    Patient Temperature 37.0 degrees Celsius    FiO2 100 %   Transthoracic Echo (TTE) Limited   Result Value Ref Range    Tricuspid annular plane systolic excursion 12.7 cm   Blood Gas Arterial Full Panel   Result Value Ref Range    POCT pH, Arterial 7.24 (LL) 7.38 - 7.42 pH    POCT pCO2, Arterial 35 (L) 38 - 42 mm Hg    POCT pO2, Arterial 95 85 - 95 mm Hg    POCT SO2, Arterial 99 94 - 100 %    POCT Oxy Hemoglobin, Arterial 96.0 94.0 - 98.0 %    POCT Hematocrit Calculated, Arterial 28.0 (L) 41.0 - 52.0 %    POCT Sodium, Arterial 136 136 - 145 mmol/L    POCT Potassium, Arterial 4.8 3.5 - 5.3 mmol/L    POCT Chloride, Arterial 99 98 - 107 mmol/L    POCT Ionized Calcium, Arterial 1.05 (L) 1.10 - 1.33 mmol/L    POCT Glucose, Arterial 210 (H) 74 - 99 mg/dL    POCT Lactate,  Arterial 14.7 (HH) 0.4 - 2.0 mmol/L    POCT Base Excess, Arterial -11.4 (L) -2.0 - 3.0 mmol/L    POCT HCO3 Calculated, Arterial 15.0 (L) 22.0 - 26.0 mmol/L    POCT Hemoglobin, Arterial 9.3 (L) 13.5 - 17.5 g/dL    POCT Anion Gap, Arterial 27 (H) 10 - 25 mmo/L    Patient Temperature 37.0 degrees Celsius    FiO2 100 %   Creatine Kinase   Result Value Ref Range    Creatine Kinase 3,619 (H) 0 - 325 U/L   CBC   Result Value Ref Range    WBC 8.5 4.4 - 11.3 x10*3/uL    nRBC 1.8 (H) 0.0 - 0.0 /100 WBCs    RBC 2.69 (L) 4.50 - 5.90 x10*6/uL    Hemoglobin 8.0 (L) 13.5 - 17.5 g/dL    Hematocrit 22.6 (L) 41.0 - 52.0 %    MCV 84 80 - 100 fL    MCH 29.7 26.0 - 34.0 pg    MCHC 35.4 32.0 - 36.0 g/dL    RDW 15.2 (H) 11.5 - 14.5 %    Platelets 82 (L) 150 - 450 x10*3/uL   Coagulation Screen   Result Value Ref Range    Protime 21.2 (H) 9.8 - 12.8 seconds    INR 1.9 (H) 0.9 - 1.1    aPTT 36 27 - 38 seconds   Calcium, ionized   Result Value Ref Range    POCT Calcium, Ionized 1.06 (L) 1.1 - 1.33 mmol/L   Renal Function Panel   Result Value Ref Range    Glucose 206 (H) 74 - 99 mg/dL    Sodium 142 136 - 145 mmol/L    Potassium 4.7 3.5 - 5.3 mmol/L    Chloride 98 98 - 107 mmol/L    Bicarbonate 16 (L) 21 - 32 mmol/L    Anion Gap 33 (H) 10 - 20 mmol/L    Urea Nitrogen 29 (H) 6 - 23 mg/dL    Creatinine 3.07 (H) 0.50 - 1.30 mg/dL    eGFR 22 (L) >60 mL/min/1.73m*2    Calcium 8.3 (L) 8.6 - 10.6 mg/dL    Phosphorus 6.5 (H) 2.5 - 4.9 mg/dL    Albumin 3.0 (L) 3.4 - 5.0 g/dL   Magnesium   Result Value Ref Range    Magnesium 1.92 1.60 - 2.40 mg/dL   POCT GLUCOSE   Result Value Ref Range    POCT Glucose 194 (H) 74 - 99 mg/dL   Blood Gas Arterial Full Panel   Result Value Ref Range    POCT pH, Arterial 7.24 (LL) 7.38 - 7.42 pH    POCT pCO2, Arterial 38 38 - 42 mm Hg    POCT pO2, Arterial 101 (H) 85 - 95 mm Hg    POCT SO2, Arterial 97 94 - 100 %    POCT Oxy Hemoglobin, Arterial 95.2 94.0 - 98.0 %    POCT Hematocrit Calculated, Arterial 27.0 (L) 41.0 - 52.0  %    POCT Sodium, Arterial 138 136 - 145 mmol/L    POCT Potassium, Arterial 4.5 3.5 - 5.3 mmol/L    POCT Chloride, Arterial 99 98 - 107 mmol/L    POCT Ionized Calcium, Arterial 1.09 (L) 1.10 - 1.33 mmol/L    POCT Glucose, Arterial 180 (H) 74 - 99 mg/dL    POCT Lactate, Arterial 11.6 (HH) 0.4 - 2.0 mmol/L    POCT Base Excess, Arterial -10.3 (L) -2.0 - 3.0 mmol/L    POCT HCO3 Calculated, Arterial 16.3 (L) 22.0 - 26.0 mmol/L    POCT Hemoglobin, Arterial 8.9 (L) 13.5 - 17.5 g/dL    POCT Anion Gap, Arterial 27 (H) 10 - 25 mmo/L    Patient Temperature 37.0 degrees Celsius    FiO2 100 %        ASSESSMENT:  Etiology: Hemodynamically mediated RUTH 2/2 cardiogenic shock   Baseline Cr: 0.6   Cr trend: 3.14--> 3.23--> 3.07   Urine output: oligoanuric with UOP over 24 hours of 5 mL     Imaging: CXR ( 3/22): Pulmonary edema, increased from prior. Opacification of the left retrocardiac lung and obscuration of the left costophrenic angle may represent small pleural effusion with atelectasis/infiltrate.     CRRT started overnight by night resident due to worsening acidosis, hyperK, and volume status. No fluid removed overnight.     RECOMMENDATIONS:  - Renal US when more stable  - c/w CRRT and continue to monitor volume status   - RFP BID   - Dose medications for CRRT  - Strict I/O     Seen and discussed with Dr. Kemp,    Honey Bledsoe MD, MPH   PGY-2   Nephro pager [phone]

## 2024-03-22 NOTE — CARE PLAN
Nephrology Care plan    Jason Hollins is a 63 y.o. 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 Hawkins and Dr. Smith on 3/5/24. On 3/20, he developed massive PE and was given TPA, taken to OR today in setting of increased swelling at flap site- hematoma for exploration and evacuation.     He is currently on levo, vaso, epi, angiotensin, epo with Bp of around 120/55. Intubated at 100% Fio2 with CXR showing evidence of congestion. Remains anuric since morning. Is and Os sig for 11L/6.7L/+4.9L. Anuric over last ~18 hrs. Yesterday he made aound 850ml UOP    Labs sig for cr of 2.64 (b.l cr 0.6), lactate 9, Ph 7.29, PCO2 43, K is WNL (4.5). Imaging sig for prominent interstitial markings in lungs    PLAN:  - Renal US to rule out obs  - Creatine kinase levels  - there is no emergent indication to start RRT however may be prepared for it by securing access. Will re-eval for RRT needs  - will follow    D/w Dr. Kemp    ADDEND: given the worsening of acidosis, shock,  hyperkalemia, VOL in setting of anuria started the patient on CVVH. Serum phos and Mag BID and replete accordingly      Marlen Aguilera MD  Nephrology Fellow   Daytime / Weekend Renal Pager [phone]  After 7 pm Emergencies [phone] Pager [phone]

## 2024-03-22 NOTE — SIGNIFICANT EVENT
Pulse check    Examined patient. Continues to be be intubated and sedated. Palpable R femoral pulse. On multiple pressors (angio, vaso, noreepi, epi).  Will CTM.

## 2024-03-22 NOTE — PROGRESS NOTES
"Jason Hollins is a 63 y.o. male on day 17 of admission presenting with Soft tissue sarcoma (CMS/HCC).    Subjective   No further bleeding overnight.        Objective     Physical Exam    Last Recorded Vitals  Blood pressure (!) 131/41, pulse (!) 117, temperature 36.7 °C (98.1 °F), temperature source Bladder, resp. rate 24, height 1.778 m (5' 10\"), weight 129 kg (284 lb 2.8 oz), SpO2 95 %.  Intake/Output last 3 Shifts:  I/O last 3 completed shifts:  In: 90010.5 (140.7 mL/kg) [I.V.:5363 (41.6 mL/kg); Blood:24709.6; IV Piggyback:2625.9]  Out: 7270 (56.4 mL/kg) [Urine:270 (0.1 mL/kg/hr); Drains:490; Blood:6510]  Weight: 128.9 kg     Relevant Results           This patient currently has cardiac telemetry ordered; if you would like to modify or discontinue the telemetry order, click here to go to the orders activity to modify/discontinue the order.  This patient has a central line   Reason for the central line remaining today? Dialysis/Hemapheresis, Hemodynamic monitoring, and Parenteral medication    This patient has a urinary catheter   Reason for the urinary catheter remaining today? critically ill patient who need accurate urinary output measurements    This patient is intubated   Reason for patient to remain intubated today? they have continued cardiopulmonary lability/instability and they have inadequate gas-exchange without positive pressure             Assessment/Plan   Principal Problem:    Soft tissue sarcoma (CMS/HCC)  Active Problems:    Extraskeletal myxoid chondrosarcoma (CMS/HCC)    Cardiopulmonary arrest (CMS/HCC)    63 y.o. 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 Hawkins and Dr. Smith on 3/5/24.  Post operative course was uncomplicated and patient was on RNF until 3/20 for prolonged bed rest to avoid hip " flexion to maintain flap integrity.  Patient was on prophylactic lovenox while on bedrest. Per nursing report, patient was getting to the side of the bed today to get up and walk when he had sudden chest heaviness. Pre-cardiac arrest EKG with signs of anterior ischemia. Nursing was called by family because patient went unresponsive.  ACLS was immediately started. ROSC was achieved after 1 round of CPR, however patient quickly decompensated into PEA.  Airway was secured.  Multiple rounds of CPR were completed with intermittent ROSC prior to transport to CTICU.  Patient arrived to CTICU under SICU care with active CPR underway.  Primary concern for STEMI vs massive PE.  Bedside POCUS TTE with biventricular failure and dilated RV.  RV septal bowing. Decided to give TPA to treat massive PE.  After several minutes of CPR patient achieved  sustained ROSC. Subsequently taken to cath lab where angiography illustrated clean coronaries and no significant PE.      3/20: Now s/p CPR with ROSC after TPA, overnight started on heparin, epo, increased pressor requirements, increased swelling at flap site.   3/21: MTP transfusions. Return to OR for exploration of R Flap site. High pressors. On epo.       I evaluated the patient with an advanced practice provider. I oversaw this patient's care, and I participated actively their clinical course.     Plan:  Neuro: sedated on propofol/fentanyl; given worsening hemodynamics and respiratory status, maintain deep sedation and start cisatracurium infusion  Cardiovascular: Undifferentiated shock, remains on significant hemodynamic support epi 0.1, NE 0.5, angiotensin II 40, vaso 0.06; maintain invasive access; continue to support MAP >65mmHg. Repeat ECHO today with hyperdynamic LV and relatively normal RV function. No pericardial effusion, no noted PE on angiogram, no PTX, unlikely obstructive. Central venous saturation of 80%. Unlikely cardiogenic. Added stress dose steroids for possible  adrenal insufficiency. Hemorrhage currently controlled. Add micafungin for broader coverage of possible septic shock. Will consult heme for consideration of HLH.   Respiratory: Acute hypoxemic respiratory failure, likely multifactorial in the setting of hypervolemia/RV strain. PaO2 90s on 100% and PEEP 12. NMB to facilitate increased PEEP given atelectasis and edema.   GI: NPO; PPI. Pressor requirements too high to consider enteral nutrition. Persistent lactic acidosis. Elevated INR, elevated AST/ALT, hyperglycemia- some values consistent with liver injury. RUQ ultrasound to assess hepatic flow. CT head, chest, abdomen, and pelvis to look for source of lactate.   : Marino required for strict I/O monitoring; oliguric/anuric overnight; started on RRT overnight for persistent metabolic acidosis. Wean off bicarbonate infusion as pH improves.   Endo: Hx DM2. Continue insulin infusion per protocol.   Heme: acute blood loss anemia from surgical limb; continue to balance transfusion; no longer requiring MTP; given propensity for bleeding, currently actively holding all AC agents for 24 hours; will re-assess tomorrow.   ID: continue vancomycin/zosyn for broad coverage. Add micafungin given severity of illness while cultures pending. Mupirocin x 5 days for MRSA swab positive.   Dispo: SICU Care; family members updated    Due to the high probability of life threatening clinical decompensation, the patient required critical care time evaluating and managing this patient.  Critical care time included obtaining a history, examining the patient, ordering and reviewing studies, discussing, developing, and implementing a management plan, evaluating the patient's response to treatment, and discussion with other care team providers. I saw and evaluated the patient myself. I reviewed the provider's documentation and discussed the patient with the provider. Critical care time was performed exclusive of billable procedures.    Critical  Care Time: 90 minutes         I spent 90 minutes in the professional and overall care of this patient.      Yamilex Rouse MD

## 2024-03-22 NOTE — CONSULTS
"Vancomycin Dosing by Pharmacy- FOLLOW UP    Jason Hollins is a 63 y.o. year old male who Pharmacy has been consulted for vancomycin dosing for other surgical prophylaxis . Based on the patient's indication and renal status this patient is being dosed based on a goal trough/random level of 15-20.     Patient is being started on CVVH 3/22 AM.    Visit Vitals  BP (!) 131/41   Pulse (!) 135   Temp 38.1 °C (100.6 °F) (Bladder)   Resp (!) 31        Lab Results   Component Value Date    CREATININE 3.14 (H) 03/22/2024    CREATININE 2.64 (H) 03/21/2024    CREATININE 2.56 (H) 03/21/2024    CREATININE 2.65 (H) 03/21/2024        Patient weight is No results found for: \"PTWEIGHT\"    No results found for: \"CULTURE\"     I/O last 3 completed shifts:  In: 12885.3 (150.9 mL/kg) [P.O.:200; I.V.:3775.3 (37.3 mL/kg); Blood:9694; IV Piggyback:1591]  Out: 7585 (75 mL/kg) [Urine:915 (0.3 mL/kg/hr); Drains:160; Blood:6510]  Weight: 101.1 kg   [unfilled]    Lab Results   Component Value Date    PATIENTTEMP 37.0 03/22/2024    PATIENTTEMP 37.0 03/22/2024    PATIENTTEMP 37.0 03/21/2024        Assessment/Plan    Patient will be given a loading dose of 2 g 3/22 AM.  Will start vancomycin 750 mg Q12 while on CVVH.   The next level will be obtained on 3/23 with AM labs prior to third dose of vancomycin. May be obtained sooner if clinically indicated.   Will continue to monitor renal function daily while on vancomycin and order serum creatinine at least every 48 hours if not already ordered.  Follow for continued vancomycin needs, clinical response, and signs/symptoms of toxicity.       Marcial Heaton, PharmD           "

## 2024-03-22 NOTE — OP NOTE
[facility] - Operative Report  Name: Jason Hollins   MRN: [id number]  Date of Procedure: 04/01/24  Location: [facility] [place]    Attending Surgeon: Osman Perkins MD     Resident/Fellow: Garry Hannon    Preoperative Diagnosis:  Respiratory failure    Postoperative Diagnosis:  Same    Operative indications:  This patient has been in the intensive care and intubated for more than 10 days.  He is here today in the operating room to undergo a tracheotomy.  The procedure cannot be done at the bedside because of the patient's anatomy.    Procedure:  Hybrid percutaneous and open tracheotomy (modifier 22), flexible bronchoscopy  Operative procedure:  Under general anesthesia on the ICU bed the patient was properly positioned and prepped and draped in the usual sterile fashion an incision was made in the suprasternal area.  An assistant was pulling the laryngeal structures superiorly.  Once the anatomy could be palpated through the incision the operation was switched to the percutaneous technique.  The bronchoscope was inserted through the endotracheal tube into the tracheobronchial tree.  The distal end of the tube was identified and the tube was progressively moved superiorly until pressure over the trachea could be seen.  The needle was then inserted.  The guidewire was then inserted through the needle into the airway.  The first dilator was introduced.  Then the second dilator and the sheath were introduced.  The trachea was dilated to proper size.  The sheath was left in place as well as the guidewire.  A proximal XLT Shiley #6 was introduced.  The position was verified with the bronchoscope as well as with the colleagues in anesthesia and was felt to be in proper position.  The tube was sutured in place with 2-0 silk.  It was felt that a 22 modifier was justified because of the patient's difficult anatomy.  The patient was returned to the intensive care unit in good condition.    I was present  for the entire procedure    Osman Perkins MD

## 2024-03-22 NOTE — NURSING NOTE
Pre-Procedure Checklist    Emergent Line InsertionNoType of Line to be PlacedHemodialysisConsent ObtainedYesEmergency Medication NecessaryNoPatient Identified with 2 Independent IdentifiersYesReview of Allergies, Anticoagulation, Relevant Labs, ECG/TelemetryYesRisks/Benefits/Alternatives Discussed with Patient/POA/Legal RepresentativeYesStop Sign on DoorYesTime Out PerformedYesCatheter ExchangeNo  Positioning and Preparation Checklist    All People, Including Patient, in the Room with Cap and MaskYesFluoroscopy Used to Identify Vessel and Guide Insertion; Sterile Cover UsedYesUltrasound Used to Identify Vessel and Guide Insertion; Sterile Cover UsedYesFull Barrier Precautions Followed (Mask, Cap, Gown, Gloves)YesHands WashedYesMonitors Attached with Sound Alarms OnYesFull Body Sterile Drape (Head-to-Toe) Used to Cover PatientYesT[place]burg Position (For IJ and Subclavian)NoCHG Skin Prep Used and Allowed to Air Dry Prior to Skin ProcedureYes  Procedure Checklist    Blood Aspirated From All Lumens, All Ports Subsequently FlushedYesCatheter Caps Placed On All Lumens; Lumens ClampedYesMaintain Guidewire Control Throughout, Ensuring Guidewire RemovalYesMaintain Sterile Field Throughout InsertionYesCatheter SecuredYesConfirmatory Test of Venous PlacementLongitudinal ultrasound  Post-Procedure Checklist    Date and Time Written on DressingYesSharp and Wire Count and Safe Disposal of all Sharps/WiresYesSterile Dressing Applied Per ProtocolYesX-ray Ordered or ECG ImageYes

## 2024-03-22 NOTE — PROCEDURES
Central Line    Date/Time: 3/22/2024 1:28 AM    Performed by: Marcial Gamble MD  Authorized by: Chay Levy MD    Consent:     Consent obtained:  Verbal    Consent given by:  Spouse    Risks, benefits, and alternatives were discussed: yes      Risks discussed:  Bleeding, arterial puncture, incorrect placement, infection and nerve damage    Alternatives discussed:  Delayed treatment, no treatment, alternative treatment and observation  Universal protocol:     Procedure explained and questions answered to patient or proxy's satisfaction: yes      Relevant documents present and verified: yes      Test results available: yes      Imaging studies available: yes      Required blood products, implants, devices, and special equipment available: yes      Immediately prior to procedure, a time out was called: yes      Patient identity confirmed:  Hospital-assigned identification number and arm band  Pre-procedure details:     Indication(s): central venous access      Hand hygiene: Hand hygiene performed prior to insertion      Sterile barrier technique: All elements of maximal sterile technique followed      Skin preparation:  Chlorhexidine    Skin preparation agent: Skin preparation agent completely dried prior to procedure    Sedation:     Sedation type:  Deep  Anesthesia:     Anesthesia method:  Local infiltration    Local anesthetic:  Lidocaine 1% w/o epi  Procedure details:     Location:  L femoral    Patient position:  Supine    Procedural supplies:  Triple lumen    Catheter size:  13 Fr    Landmarks identified: yes      Ultrasound guidance: yes      Ultrasound guidance timing: prior to insertion and real time      Sterile ultrasound techniques: Sterile gel and sterile probe covers were used      Number of attempts:  1    Successful placement: yes    Post-procedure details:     Post-procedure:  Dressing applied and line sutured    Assessment:  Blood return through all ports and free fluid flow (Xray verification  ordered and pending completion. Additionally manometry performed and indicated venous placement.)    Procedure completion:  Tolerated well, no immediate complications

## 2024-03-22 NOTE — PROGRESS NOTES
"Jason Hollins is a 63 y.o. male on day 17 of admission presenting with Soft tissue sarcoma (CMS/HCC).    Subjective   Remains intubated.  100% FiO2.  ABG 7.22/33.  Lactate remains elevated in the low teens.  Remains on A-T 2, vasopressin, epinephrine, and norepinephrine IV infusions at high doses albeit somewhat improved from yesterday prior to OR.  Dialysis line placed overnight and CVVH started.  Running even.  Not making urine.       Objective     Physical Exam  HENT:      Head: Normocephalic.      Mouth/Throat:      Mouth: Mucous membranes are moist.   Eyes:      Pupils: Pupils are equal, round, and reactive to light.   Cardiovascular:      Rate and Rhythm: Tachycardia present.   Pulmonary:      Breath sounds: Rhonchi present.   Abdominal:      Palpations: Abdomen is soft.   Skin:     General: Skin is dry.      Comments: Right flap site with wound VAC and ANDERSON x 3.  Minimal bloody output.   Neurological:      Comments: Followed simple commands with sedation held.       Last Recorded Vitals  Blood pressure (!) 131/41, pulse (!) 120, temperature 37.6 °C (99.7 °F), temperature source Bladder, resp. rate (!) 33, height 1.778 m (5' 10\"), weight 129 kg (284 lb 2.8 oz), SpO2 99 %.  Intake/Output last 3 Shifts:  I/O last 3 completed shifts:  In: 56636.3 (150.9 mL/kg) [P.O.:200; I.V.:3775.3 (37.3 mL/kg); Blood:9694; IV Piggyback:1591]  Out: 7585 (75 mL/kg) [Urine:915 (0.3 mL/kg/hr); Drains:160; Blood:6510]  Weight: 101.1 kg     Relevant Results  Results for orders placed or performed during the hospital encounter of 03/05/24 (from the past 24 hour(s))   Prepare RBC   Result Value Ref Range    PRODUCT CODE J6334L42     Unit Number [id number]     Unit ABO O     Unit RH NEG     Dispense Status PI     Blood Expiration Date March 21, 2024 23:59 EDT     PRODUCT BLOOD TYPE 9500     UNIT VOLUME 292     PRODUCT CODE U9262Z89     Unit Number [id number]     Unit ABO O     Unit RH POS     Dispense Status PI     Blood Expiration " Date April 10, 2024 23:59 EDT     PRODUCT BLOOD TYPE 5100     UNIT VOLUME 278     XM INTEP COMP     XM INTEP COMP    POCT GLUCOSE   Result Value Ref Range    POCT Glucose 270 (H) 74 - 99 mg/dL   Prepare RBC: 4 Units   Result Value Ref Range    PRODUCT CODE X4251W15     Unit Number [id number]     Unit ABO O     Unit RH POS     XM INTEP COMP     Dispense Status IS     Blood Expiration Date April 09, 2024 23:59 EDT     PRODUCT BLOOD TYPE 5100     UNIT VOLUME 350     PRODUCT CODE R2359C56     Unit Number [id number]     Unit ABO O     Unit RH POS     XM INTEP COMP     Dispense Status IS     Blood Expiration Date April 09, 2024 23:59 EDT     PRODUCT BLOOD TYPE 5100     UNIT VOLUME 350     PRODUCT CODE D5075B66     Unit Number [id number]     Unit ABO O     Unit RH POS     XM INTEP COMP     Dispense Status IS     Blood Expiration Date April 04, 2024 23:59 EDT     PRODUCT BLOOD TYPE 5100     UNIT VOLUME 279     PRODUCT CODE G4639D35     Unit Number [id number]     Unit ABO O     Unit RH POS     XM INTEP COMP     Dispense Status IS     Blood Expiration Date April 09, 2024 23:59 EDT     PRODUCT BLOOD TYPE 5100     UNIT VOLUME 350    Prepare Platelets: 1 Units   Result Value Ref Range    PRODUCT CODE U2461G61     Unit Number [id number]     Unit ABO O     Unit RH POS     Dispense Status IS     Blood Expiration Date March 21, 2024 23:59 EDT     PRODUCT BLOOD TYPE 5100     UNIT VOLUME 270    Prepare Plasma: 4 Units   Result Value Ref Range    PRODUCT CODE G7741Z80     Unit Number [id number]     Unit ABO O     Unit RH POS     Dispense Status IS     Blood Expiration Date March 26, 2024 06:22 EDT     PRODUCT BLOOD TYPE 5100     UNIT VOLUME 226     PRODUCT CODE U5027G94     Unit Number [id number]     Unit ABO A     Unit RH POS     Dispense Status IS     Blood Expiration Date March 26, 2024 07:29 EDT     PRODUCT BLOOD TYPE 6200     UNIT VOLUME 315     PRODUCT CODE S4148S38     Unit Number  [id number]     Unit ABO A     Unit RH POS     Dispense Status IS     Blood Expiration Date March 26, 2024 07:29 EDT     PRODUCT BLOOD TYPE 6200     UNIT VOLUME 318     PRODUCT CODE V1808D52     Unit Number [id number]     Unit ABO A     Unit RH POS     Dispense Status IS     Blood Expiration Date March 26, 2024 07:29 EDT     PRODUCT BLOOD TYPE 6200     UNIT VOLUME 333    Blood Gas Arterial Full Panel   Result Value Ref Range    POCT pH, Arterial 6.96 (LL) 7.38 - 7.42 pH    POCT pCO2, Arterial 38 38 - 42 mm Hg    POCT pO2, Arterial 117 (H) 85 - 95 mm Hg    POCT SO2, Arterial 98 94 - 100 %    POCT Oxy Hemoglobin, Arterial 95.6 94.0 - 98.0 %    POCT Hematocrit Calculated, Arterial 16.0 (L) 41.0 - 52.0 %    POCT Sodium, Arterial 138 136 - 145 mmol/L    POCT Potassium, Arterial 4.7 3.5 - 5.3 mmol/L    POCT Chloride, Arterial 102 98 - 107 mmol/L    POCT Ionized Calcium, Arterial 1.10 1.10 - 1.33 mmol/L    POCT Glucose, Arterial 267 (H) 74 - 99 mg/dL    POCT Lactate, Arterial >17.0 (HH) 0.4 - 2.0 mmol/L    POCT Base Excess, Arterial -21.3 (L) -2.0 - 3.0 mmol/L    POCT HCO3 Calculated, Arterial 8.5 (L) 22.0 - 26.0 mmol/L    POCT Hemoglobin, Arterial 5.4 (LL) 13.5 - 17.5 g/dL    POCT Anion Gap, Arterial 32 (H) 10 - 25 mmo/L    Patient Temperature 37.0 degrees Celsius    FiO2 70 %   Troponin I, High Sensitivity   Result Value Ref Range    Troponin I, High Sensitivity 13,624 (HH) 0 - 53 ng/L   Renal Function Panel   Result Value Ref Range    Glucose 295 (H) 74 - 99 mg/dL    Sodium 141 136 - 145 mmol/L    Potassium 5.0 3.5 - 5.3 mmol/L    Chloride 101 98 - 107 mmol/L    Bicarbonate 10 (LL) 21 - 32 mmol/L    Anion Gap 35 (H) 10 - 20 mmol/L    Urea Nitrogen 28 (H) 6 - 23 mg/dL    Creatinine 2.55 (H) 0.50 - 1.30 mg/dL    eGFR 28 (L) >60 mL/min/1.73m*2    Calcium 7.7 (L) 8.6 - 10.6 mg/dL    Phosphorus 8.8 (H) 2.5 - 4.9 mg/dL    Albumin 3.0 (L) 3.4 - 5.0 g/dL   Magnesium   Result Value Ref Range    Magnesium 2.59 (H) 1.60 -  2.40 mg/dL   Heparin Assay, UFH   Result Value Ref Range    Heparin Unfractionated 0.9 See Comment Below for Therapeutic Ranges IU/mL   Coagulation Screen   Result Value Ref Range    Protime 34.4 (H) 9.8 - 12.8 seconds    INR 3.0 (H) 0.9 - 1.1    aPTT >200 (HH) 27 - 38 seconds   CBC   Result Value Ref Range    WBC 19.9 (H) 4.4 - 11.3 x10*3/uL    nRBC 1.1 (H) 0.0 - 0.0 /100 WBCs    RBC 2.06 (L) 4.50 - 5.90 x10*6/uL    Hemoglobin 6.1 (LL) 13.5 - 17.5 g/dL    Hematocrit 19.4 (L) 41.0 - 52.0 %    MCV 94 80 - 100 fL    MCH 29.6 26.0 - 34.0 pg    MCHC 31.4 (L) 32.0 - 36.0 g/dL    RDW 13.8 11.5 - 14.5 %    Platelets 291 150 - 450 x10*3/uL   Calcium, Ionized   Result Value Ref Range    POCT Calcium, Ionized 0.98 (L) 1.1 - 1.33 mmol/L   Blood Gas Arterial Full Panel   Result Value Ref Range    POCT pH, Arterial 7.08 (LL) 7.38 - 7.42 pH    POCT pCO2, Arterial 46 (H) 38 - 42 mm Hg    POCT pO2, Arterial 75 (L) 85 - 95 mm Hg    POCT SO2, Arterial 96 94 - 100 %    POCT Oxy Hemoglobin, Arterial 93.3 (L) 94.0 - 98.0 %    POCT Hematocrit Calculated, Arterial 20.0 (L) 41.0 - 52.0 %    POCT Sodium, Arterial 141 136 - 145 mmol/L    POCT Potassium, Arterial 3.7 3.5 - 5.3 mmol/L    POCT Chloride, Arterial 102 98 - 107 mmol/L    POCT Ionized Calcium, Arterial 0.99 (L) 1.10 - 1.33 mmol/L    POCT Glucose, Arterial 262 (H) 74 - 99 mg/dL    POCT Lactate, Arterial >17.0 (HH) 0.4 - 2.0 mmol/L    POCT Base Excess, Arterial -15.1 (L) -2.0 - 3.0 mmol/L    POCT HCO3 Calculated, Arterial 13.6 (L) 22.0 - 26.0 mmol/L    POCT Hemoglobin, Arterial 6.5 (LL) 13.5 - 17.5 g/dL    POCT Anion Gap, Arterial 29 (H) 10 - 25 mmo/L    Patient Temperature 37.0 degrees Celsius    FiO2 70 %   Prepare RBC: 4 Units   Result Value Ref Range    PRODUCT CODE X2647U18     Unit Number [id number]     Unit ABO O     Unit RH POS     XM INTEP COMP     Dispense Status TR     Blood Expiration Date April 16, 2024 23:59 EDT     PRODUCT BLOOD TYPE 5100     UNIT VOLUME 350      PRODUCT CODE I2422L27     Unit Number [id number]     Unit ABO O     Unit RH POS     XM INTEP COMP     Dispense Status IS     Blood Expiration Date April 16, 2024 23:59 EDT     PRODUCT BLOOD TYPE 5100     UNIT VOLUME 350     PRODUCT CODE B9027F42     Unit Number [id number]     Unit ABO O     Unit RH POS     XM INTEP COMP     Dispense Status IS     Blood Expiration Date April 16, 2024 23:59 EDT     PRODUCT BLOOD TYPE 5100     UNIT VOLUME 350     PRODUCT CODE W3288O12     Unit Number [id number]     Unit ABO O     Unit RH POS     XM INTEP COMP     Dispense Status TR     Blood Expiration Date April 16, 2024 23:59 EDT     PRODUCT BLOOD TYPE 5100     UNIT VOLUME 350    Transthoracic Echo (TTE) Limited   Result Value Ref Range    Tricuspid annular plane systolic excursion 1.7 cm    RV free wall pk S' 12.50 cm/s    LVIDd 4.80 cm    BSA 2.23 m2   Prepare Plasma   Result Value Ref Range    PRODUCT CODE S6793Y73     Unit Number [id number]     Unit ABO A     Unit RH POS     Dispense Status IS     Blood Expiration Date March 26, 2024 07:30 EDT     PRODUCT BLOOD TYPE 6200     UNIT VOLUME 289     PRODUCT CODE U8811G85     Unit Number [id number]     Unit ABO A     Unit RH POS     Dispense Status IS     Blood Expiration Date March 26, 2024 07:30 EDT     PRODUCT BLOOD TYPE 6200     UNIT VOLUME 299     PRODUCT CODE B7203K17     Unit Number [id number]     Unit ABO A     Unit RH NEG     Dispense Status IS     Blood Expiration Date March 26, 2024 07:29 EDT     PRODUCT BLOOD TYPE 0600     UNIT VOLUME 341     PRODUCT CODE L7629O79     Unit Number [id number]     Unit ABO A     Unit RH POS     Dispense Status IS     Blood Expiration Date March 22, 2024 23:15 EDT     PRODUCT BLOOD TYPE 6200     UNIT VOLUME 351     PRODUCT CODE N4646F94     Unit Number [id number]     Unit ABO A     Unit RH POS     Dispense Status TR     Blood Expiration Date March 26, 2024 12:24 EDT     PRODUCT BLOOD TYPE 6200     UNIT  VOLUME 223    Prepare Platelets   Result Value Ref Range    PRODUCT CODE S9443N25     Unit Number [id number]     Unit ABO O     Unit RH POS     Dispense Status IS     Blood Expiration Date March 21, 2024 23:59 EDT     PRODUCT BLOOD TYPE 5100     UNIT VOLUME 256    Prepare RBC   Result Value Ref Range    PRODUCT CODE Y9898S83     Unit Number [id number]     Unit ABO O     Unit RH POS     XM INTEP COMP     Dispense Status IS     Blood Expiration Date April 16, 2024 23:59 EDT     PRODUCT BLOOD TYPE 5100     UNIT VOLUME 350     PRODUCT CODE A3760Z80     Unit Number [id number]     Unit ABO O     Unit RH POS     XM INTEP COMP     Dispense Status IS     Blood Expiration Date April 16, 2024 23:59 EDT     PRODUCT BLOOD TYPE 5100     UNIT VOLUME 350     PRODUCT CODE J4686M18     Unit Number [id number]     Unit ABO O     Unit RH POS     XM INTEP COMP     Dispense Status TR     Blood Expiration Date April 16, 2024 23:59 EDT     PRODUCT BLOOD TYPE 5100     UNIT VOLUME 350    Prepare Plasma   Result Value Ref Range    PRODUCT CODE L0258W32     Unit Number [id number]     Unit ABO A     Unit RH POS     Dispense Status IS     Blood Expiration Date March 26, 2024 07:30 EDT     PRODUCT BLOOD TYPE 6200     UNIT VOLUME 318     PRODUCT CODE Z6457Z97     Unit Number [id number]     Unit ABO A     Unit RH NEG     Dispense Status IS     Blood Expiration Date March 25, 2024 11:22 EDT     PRODUCT BLOOD TYPE 0600     UNIT VOLUME 313     PRODUCT CODE R3014O94     Unit Number [id number]     Unit ABO AB     Unit RH POS     Dispense Status IS     Blood Expiration Date March 23, 2024 07:26 EDT     PRODUCT BLOOD TYPE 8400     UNIT VOLUME 254     PRODUCT CODE N1240Z83     Unit Number [id number]     Unit ABO AB     Unit RH POS     Dispense Status IS     Blood Expiration Date March 23, 2024 07:26 EDT     PRODUCT BLOOD TYPE 8400     UNIT VOLUME 317     PRODUCT CODE K3066V11     Unit Number [id number]     Unit  ABO A     Unit RH NEG     Dispense Status RE     Blood Expiration Date March 26, 2024 08:42 EDT     PRODUCT BLOOD TYPE 0600     UNIT VOLUME 328    Blood Gas Arterial Full Panel   Result Value Ref Range    POCT pH, Arterial 7.02 (LL) 7.38 - 7.42 pH    POCT pCO2, Arterial 44 (H) 38 - 42 mm Hg    POCT pO2, Arterial 71 (L) 85 - 95 mm Hg    POCT SO2, Arterial 95 94 - 100 %    POCT Oxy Hemoglobin, Arterial 93.4 (L) 94.0 - 98.0 %    POCT Hematocrit Calculated, Arterial 23.0 (L) 41.0 - 52.0 %    POCT Sodium, Arterial 140 136 - 145 mmol/L    POCT Potassium, Arterial 3.5 3.5 - 5.3 mmol/L    POCT Chloride, Arterial 104 98 - 107 mmol/L    POCT Ionized Calcium, Arterial 0.95 (L) 1.10 - 1.33 mmol/L    POCT Glucose, Arterial 263 (H) 74 - 99 mg/dL    POCT Lactate, Arterial 14.1 (HH) 0.4 - 2.0 mmol/L    POCT Base Excess, Arterial -18.3 (L) -2.0 - 3.0 mmol/L    POCT HCO3 Calculated, Arterial 11.4 (L) 22.0 - 26.0 mmol/L    POCT Hemoglobin, Arterial 7.7 (L) 13.5 - 17.5 g/dL    POCT Anion Gap, Arterial 28 (H) 10 - 25 mmo/L    Patient Temperature 37.0 degrees Celsius    FiO2 70 %   Prepare Plasma   Result Value Ref Range    PRODUCT CODE M0877HF1     Unit Number [id number]     Unit ABO A     Unit RH POS     Dispense Status IS     Blood Expiration Date March 22, 2024 23:15 EDT     PRODUCT BLOOD TYPE 6200     UNIT VOLUME 201     PRODUCT CODE K8295V77     Unit Number [id number]     Unit ABO A     Unit RH POS     Dispense Status TR     Blood Expiration Date March 22, 2024 23:15 EDT     PRODUCT BLOOD TYPE 6200     UNIT VOLUME 326     PRODUCT CODE I9985V13     Unit Number [id number]     Unit ABO A     Unit RH POS     Dispense Status TR     Blood Expiration Date March 22, 2024 23:15 EDT     PRODUCT BLOOD TYPE 6200     UNIT VOLUME 322     PRODUCT CODE H7351M28     Unit Number [id number]     Unit ABO AB     Unit RH POS     Dispense Status TR     Blood Expiration Date March 22, 2024 09:03 EDT     PRODUCT BLOOD TYPE 8400     UNIT  VOLUME 208     PRODUCT CODE S5791WH3     Unit Number [id number]     Unit ABO AB     Unit RH NEG     Dispense Status TR     Blood Expiration Date March 22, 2024 09:03 EDT     PRODUCT BLOOD TYPE 2800     UNIT VOLUME 205    Prepare Platelets   Result Value Ref Range    PRODUCT CODE S0230H87     Unit Number [id number]     Unit ABO O     Unit RH POS     Dispense Status TR     Blood Expiration Date March 21, 2024 23:59 EDT     PRODUCT BLOOD TYPE 5100     UNIT VOLUME 282    Prepare Cryoprecipitated AHF   Result Value Ref Range    PRODUCT CODE U2151M17     Unit Number [id number]     Unit ABO A     Unit RH POS     Dispense Status RE     Blood Expiration Date March 21, 2024 13:27 EDT     PRODUCT BLOOD TYPE 6200     UNIT VOLUME 71    Blood Gas Arterial Full Panel   Result Value Ref Range    POCT pH, Arterial 6.92 (LL) 7.38 - 7.42 pH    POCT pCO2, Arterial 65 (H) 38 - 42 mm Hg    POCT pO2, Arterial 86 85 - 95 mm Hg    POCT SO2, Arterial 98 94 - 100 %    POCT Oxy Hemoglobin, Arterial 95.2 94.0 - 98.0 %    POCT Hematocrit Calculated, Arterial 29.0 (L) 41.0 - 52.0 %    POCT Sodium, Arterial 139 136 - 145 mmol/L    POCT Potassium, Arterial 4.4 3.5 - 5.3 mmol/L    POCT Chloride, Arterial 103 98 - 107 mmol/L    POCT Ionized Calcium, Arterial 0.96 (L) 1.10 - 1.33 mmol/L    POCT Glucose, Arterial 299 (H) 74 - 99 mg/dL    POCT Lactate, Arterial 14.8 (HH) 0.4 - 2.0 mmol/L    POCT Base Excess, Arterial -18.9 (L) -2.0 - 3.0 mmol/L    POCT HCO3 Calculated, Arterial 13.3 (L) 22.0 - 26.0 mmol/L    POCT Hemoglobin, Arterial 9.8 (L) 13.5 - 17.5 g/dL    POCT Anion Gap, Arterial 27 (H) 10 - 25 mmo/L    Patient Temperature 37.0 degrees Celsius    FiO2 100 %   CBC   Result Value Ref Range    WBC 12.3 (H) 4.4 - 11.3 x10*3/uL    nRBC 2.2 (H) 0.0 - 0.0 /100 WBCs    RBC 3.40 (L) 4.50 - 5.90 x10*6/uL    Hemoglobin 10.1 (L) 13.5 - 17.5 g/dL    Hematocrit 31.0 (L) 41.0 - 52.0 %    MCV 91 80 - 100 fL    MCH 29.7 26.0 - 34.0 pg    MCHC 32.6  32.0 - 36.0 g/dL    RDW 13.5 11.5 - 14.5 %    Platelets 147 (L) 150 - 450 x10*3/uL   Calcium, Ionized   Result Value Ref Range    POCT Calcium, Ionized 1.25 1.1 - 1.33 mmol/L   Coagulation Screen   Result Value Ref Range    Protime 20.7 (H) 9.8 - 12.8 seconds    INR 1.8 (H) 0.9 - 1.1    aPTT 68 (H) 27 - 38 seconds   Magnesium   Result Value Ref Range    Magnesium 2.65 (H) 1.60 - 2.40 mg/dL   Renal Function Panel   Result Value Ref Range    Glucose 289 (H) 74 - 99 mg/dL    Sodium 142 136 - 145 mmol/L    Potassium 4.0 3.5 - 5.3 mmol/L    Chloride 102 98 - 107 mmol/L    Bicarbonate 15 (L) 21 - 32 mmol/L    Anion Gap 29 (H) 10 - 20 mmol/L    Urea Nitrogen 30 (H) 6 - 23 mg/dL    Creatinine 2.65 (H) 0.50 - 1.30 mg/dL    eGFR 26 (L) >60 mL/min/1.73m*2    Calcium 10.0 8.6 - 10.6 mg/dL    Phosphorus 10.6 (H) 2.5 - 4.9 mg/dL    Albumin 2.8 (L) 3.4 - 5.0 g/dL   Prepare RBC   Result Value Ref Range    PRODUCT CODE S3557M10     Unit Number [id number]     Unit ABO O     Unit RH POS     XM INTEP COMP     Dispense Status RE     Blood Expiration Date April 17, 2024 23:59 EDT     PRODUCT BLOOD TYPE 5100     UNIT VOLUME 350     PRODUCT CODE P5233N36     Unit Number [id number]     Unit ABO O     Unit RH POS     XM INTEP COMP     Dispense Status TR     Blood Expiration Date April 19, 2024 23:59 EDT     PRODUCT BLOOD TYPE 5100     UNIT VOLUME 350     PRODUCT CODE R8191X14     Unit Number [id number]     Unit ABO O     Unit RH POS     XM INTEP COMP     Dispense Status TR     Blood Expiration Date April 19, 2024 23:59 EDT     PRODUCT BLOOD TYPE 5100     UNIT VOLUME 350     PRODUCT CODE S7716Z00     Unit Number [id number]     Unit ABO O     Unit RH POS     XM INTEP COMP     Dispense Status RE     Blood Expiration Date April 19, 2024 23:59 EDT     PRODUCT BLOOD TYPE 5100     UNIT VOLUME 350     PRODUCT CODE N6817M27     Unit Number [id number]     Unit ABO O     Unit RH POS     XM INTEP COMP     Dispense Status RE      Blood Expiration Date April 19, 2024 23:59 EDT     PRODUCT BLOOD TYPE 5100     UNIT VOLUME 350    Prepare Plasma   Result Value Ref Range    PRODUCT CODE X1007A50     Unit Number [id number]     Unit ABO A     Unit RH POS     Dispense Status TR     Blood Expiration Date March 25, 2024 11:17 EDT     PRODUCT BLOOD TYPE 6200     UNIT VOLUME 354     PRODUCT CODE K3276M02     Unit Number [id number]     Unit ABO A     Unit RH POS     Dispense Status TR     Blood Expiration Date March 25, 2024 11:17 EDT     PRODUCT BLOOD TYPE 6200     UNIT VOLUME 327     PRODUCT CODE Z9958G63     Unit Number [id number]     Unit ABO A     Unit RH POS     Dispense Status TR     Blood Expiration Date March 25, 2024 11:22 EDT     PRODUCT BLOOD TYPE 6200     UNIT VOLUME 306     PRODUCT CODE B6440W96     Unit Number [id number]     Unit ABO A     Unit RH POS     Dispense Status TR     Blood Expiration Date March 25, 2024 11:22 EDT     PRODUCT BLOOD TYPE 6200     UNIT VOLUME 306     PRODUCT CODE T0683X39     Unit Number [id number]     Unit ABO A     Unit RH POS     Dispense Status TR     Blood Expiration Date March 26, 2024 06:25 EDT     PRODUCT BLOOD TYPE 6200     UNIT VOLUME 303    Prepare Cryoprecipitated AHF   Result Value Ref Range    PRODUCT CODE V5234U86     Unit Number [id number]     Unit ABO O     Unit RH POS     Dispense Status TR     Blood Expiration Date March 21, 2024 19:07 EDT     PRODUCT BLOOD TYPE 5100     UNIT VOLUME 92    Blood Gas Arterial Full Panel Unsolicited   Result Value Ref Range    POCT pH, Arterial 7.01 (LL) 7.38 - 7.42 pH    POCT pCO2, Arterial 56 (H) 38 - 42 mm Hg    POCT pO2, Arterial 84 (L) 85 - 95 mm Hg    POCT SO2, Arterial 98 94 - 100 %    POCT Oxy Hemoglobin, Arterial 95.4 94.0 - 98.0 %    POCT Hematocrit Calculated, Arterial 28.0 (L) 41.0 - 52.0 %    POCT Sodium, Arterial 140 136 - 145 mmol/L    POCT Potassium, Arterial 3.9 3.5 - 5.3 mmol/L    POCT Chloride, Arterial 102 98 - 107  mmol/L    POCT Ionized Calcium, Arterial 1.02 (L) 1.10 - 1.33 mmol/L    POCT Glucose, Arterial 264 (H) 74 - 99 mg/dL    POCT Lactate, Arterial 13.4 (HH) 0.4 - 2.0 mmol/L    POCT Base Excess, Arterial -16.5 (L) -2.0 - 3.0 mmol/L    POCT HCO3 Calculated, Arterial 14.1 (L) 22.0 - 26.0 mmol/L    POCT Hemoglobin, Arterial 9.4 (L) 13.5 - 17.5 g/dL    POCT Anion Gap, Arterial 28 (H) 10 - 25 mmo/L    Patient Temperature 37.0 degrees Celsius   Prepare Platelets   Result Value Ref Range    PRODUCT CODE Q1339W60     Unit Number [id number]     Unit ABO O     Unit RH POS     Dispense Status TR     Blood Expiration Date March 21, 2024 23:59 EDT     PRODUCT BLOOD TYPE 5100     UNIT VOLUME 279    Blood Gas Arterial Full Panel Unsolicited   Result Value Ref Range    POCT pH, Arterial 7.08 (LL) 7.38 - 7.42 pH    POCT pCO2, Arterial 50 (H) 38 - 42 mm Hg    POCT pO2, Arterial 77 (L) 85 - 95 mm Hg    POCT SO2, Arterial 97 94 - 100 %    POCT Oxy Hemoglobin, Arterial 95.1 94.0 - 98.0 %    POCT Hematocrit Calculated, Arterial 31.0 (L) 41.0 - 52.0 %    POCT Sodium, Arterial 140 136 - 145 mmol/L    POCT Potassium, Arterial 3.9 3.5 - 5.3 mmol/L    POCT Chloride, Arterial 101 98 - 107 mmol/L    POCT Ionized Calcium, Arterial 1.27 1.10 - 1.33 mmol/L    POCT Glucose, Arterial 234 (H) 74 - 99 mg/dL    POCT Lactate, Arterial 12.8 (HH) 0.4 - 2.0 mmol/L    POCT Base Excess, Arterial -14.7 (L) -2.0 - 3.0 mmol/L    POCT HCO3 Calculated, Arterial 14.8 (L) 22.0 - 26.0 mmol/L    POCT Hemoglobin, Arterial 10.4 (L) 13.5 - 17.5 g/dL    POCT Anion Gap, Arterial 28 (H) 10 - 25 mmo/L    Patient Temperature 37.0 degrees Celsius   Type and screen   Result Value Ref Range    ABO TYPE O     Rh TYPE POS     ANTIBODY SCREEN NEG    Fibrinogen   Result Value Ref Range    Fibrinogen 136 (L) 200 - 400 mg/dL   Heparin Assay, UFH   Result Value Ref Range    Heparin Unfractionated 0.2 See Comment Below for Therapeutic Ranges IU/mL   Coagulation Screen   Result Value  Ref Range    Protime 17.4 (H) 9.8 - 12.8 seconds    INR 1.5 (H) 0.9 - 1.1    aPTT 61 (H) 27 - 38 seconds   Calcium, Ionized   Result Value Ref Range    POCT Calcium, Ionized 1.22 1.1 - 1.33 mmol/L   CBC   Result Value Ref Range    WBC 9.6 4.4 - 11.3 x10*3/uL    nRBC 2.2 (H) 0.0 - 0.0 /100 WBCs    RBC 3.44 (L) 4.50 - 5.90 x10*6/uL    Hemoglobin 10.3 (L) 13.5 - 17.5 g/dL    Hematocrit 30.3 (L) 41.0 - 52.0 %    MCV 88 80 - 100 fL    MCH 29.9 26.0 - 34.0 pg    MCHC 34.0 32.0 - 36.0 g/dL    RDW 13.2 11.5 - 14.5 %    Platelets 104 (L) 150 - 450 x10*3/uL   Renal Function Panel   Result Value Ref Range    Glucose 239 (H) 74 - 99 mg/dL    Sodium 144 136 - 145 mmol/L    Potassium 3.9 3.5 - 5.3 mmol/L    Chloride 103 98 - 107 mmol/L    Bicarbonate 16 (L) 21 - 32 mmol/L    Anion Gap 29 (H) 10 - 20 mmol/L    Urea Nitrogen 28 (H) 6 - 23 mg/dL    Creatinine 2.56 (H) 0.50 - 1.30 mg/dL    eGFR 28 (L) >60 mL/min/1.73m*2    Calcium 9.6 8.6 - 10.6 mg/dL    Phosphorus 9.5 (H) 2.5 - 4.9 mg/dL    Albumin 3.2 (L) 3.4 - 5.0 g/dL   Prepare RBC   Result Value Ref Range    PRODUCT CODE W3118O70     Unit Number [id number]     Unit ABO O     Unit RH POS     XM INTEP COMP     Dispense Status TR     Blood Expiration Date April 17, 2024 23:59 EDT     PRODUCT BLOOD TYPE 5100     UNIT VOLUME 350     PRODUCT CODE D5799X03     Unit Number [id number]     Unit ABO O     Unit RH POS     XM INTEP COMP     Dispense Status RE     Blood Expiration Date April 20, 2024 23:59 EDT     PRODUCT BLOOD TYPE 5100     UNIT VOLUME 331     PRODUCT CODE Q8530F89     Unit Number [id number]     Unit ABO O     Unit RH POS     XM INTEP COMP     Dispense Status RE     Blood Expiration Date April 18, 2024 23:59 EDT     PRODUCT BLOOD TYPE 5100     UNIT VOLUME 350     PRODUCT CODE G9602C29     Unit Number [id number]     Unit ABO O     Unit RH POS     XM INTEP COMP     Dispense Status RE     Blood Expiration Date April 18, 2024 23:59 EDT     PRODUCT BLOOD TYPE  5100     UNIT VOLUME 350     PRODUCT CODE X7343C98     Unit Number [id number]     Unit ABO O     Unit RH POS     XM INTEP COMP     Dispense Status RE     Blood Expiration Date April 17, 2024 23:59 EDT     PRODUCT BLOOD TYPE 5100     UNIT VOLUME 350    Prepare RBC   Result Value Ref Range    PRODUCT CODE O4204C13     Unit Number [id number]     Unit ABO O     Unit RH POS     XM INTEP COMP     Dispense Status RE     Blood Expiration Date April 20, 2024 23:59 EDT     PRODUCT BLOOD TYPE 5100     UNIT VOLUME 350     PRODUCT CODE I1424M54     Unit Number [id number]     Unit ABO O     Unit RH POS     XM INTEP COMP     Dispense Status RE     Blood Expiration Date April 19, 2024 23:59 EDT     PRODUCT BLOOD TYPE 5100     UNIT VOLUME 281     PRODUCT CODE I4555F27     Unit Number [id number]     Unit ABO O     Unit RH POS     XM INTEP COMP     Dispense Status RE     Blood Expiration Date April 20, 2024 23:59 EDT     PRODUCT BLOOD TYPE 5100     UNIT VOLUME 329     PRODUCT CODE R7074Z54     Unit Number [id number]     Unit ABO O     Unit RH POS     XM INTEP COMP     Dispense Status RE     Blood Expiration Date April 19, 2024 23:59 EDT     PRODUCT BLOOD TYPE 5100     UNIT VOLUME 281     PRODUCT CODE B2167E79     Unit Number [id number]     Unit ABO O     Unit RH POS     XM INTEP COMP     Dispense Status RE     Blood Expiration Date April 20, 2024 23:59 EDT     PRODUCT BLOOD TYPE 5100     UNIT VOLUME 332    Prepare Plasma   Result Value Ref Range    PRODUCT CODE D9514U15     Unit Number [id number]     Unit ABO O     Unit RH POS     Dispense Status RE     Blood Expiration Date March 26, 2024 12:36 EDT     PRODUCT BLOOD TYPE 5100     UNIT VOLUME 306     PRODUCT CODE M4211R47     Unit Number [id number]     Unit ABO O     Unit RH NEG     Dispense Status TR     Blood Expiration Date March 26, 2024 12:36 EDT     PRODUCT BLOOD TYPE 9500     UNIT VOLUME 273     PRODUCT CODE E8108H02     Unit Number  [id number]     Unit ABO A     Unit RH POS     Dispense Status RE     Blood Expiration Date March 26, 2024 12:24 EDT     PRODUCT BLOOD TYPE 6200     UNIT VOLUME 217    Prepare Platelets   Result Value Ref Range    PRODUCT CODE L2267U81     Unit Number [id number]     Unit ABO A     Unit RH POS     Dispense Status RE     Blood Expiration Date March 22, 2024 23:59 EDT     PRODUCT BLOOD TYPE 6200     UNIT VOLUME 263    Blood Gas Arterial Full Panel Unsolicited   Result Value Ref Range    POCT pH, Arterial 7.11 (LL) 7.38 - 7.42 pH    POCT pCO2, Arterial 50 (H) 38 - 42 mm Hg    POCT pO2, Arterial 61 (L) 85 - 95 mm Hg    POCT SO2, Arterial 93 (L) 94 - 100 %    POCT Oxy Hemoglobin, Arterial 90.6 (L) 94.0 - 98.0 %    POCT Hematocrit Calculated, Arterial 31.0 (L) 41.0 - 52.0 %    POCT Sodium, Arterial 137 136 - 145 mmol/L    POCT Potassium, Arterial 3.8 3.5 - 5.3 mmol/L    POCT Chloride, Arterial 100 98 - 107 mmol/L    POCT Ionized Calcium, Arterial 1.31 1.10 - 1.33 mmol/L    POCT Glucose, Arterial 204 (H) 74 - 99 mg/dL    POCT Lactate, Arterial 11.5 (HH) 0.4 - 2.0 mmol/L    POCT Base Excess, Arterial -13.2 (L) -2.0 - 3.0 mmol/L    POCT HCO3 Calculated, Arterial 15.9 (L) 22.0 - 26.0 mmol/L    POCT Hemoglobin, Arterial 10.4 (L) 13.5 - 17.5 g/dL    POCT Anion Gap, Arterial 25 10 - 25 mmo/L    Patient Temperature 37.0 degrees Celsius    FiO2 100 %   Coox Panel, Arterial Unsolicited   Result Value Ref Range    POCT Hemoglobin, Arterial 10.4 (L) 13.5 - 17.5 g/dL    POCT Oxy Hemoglobin, Arterial 90.6 (L) 94.0 - 98.0 %    POCT Carboxyhemoglobin, Arterial 1.3 %    POCT Methemoglobin, Arterial 1.1 0.0 - 1.5 %    POCT Deoxy Hemoglobin, Arterial 7.0 (H) 0.0 - 5.0 %   TEG Clot Global Profile Unsolicited   Result Value Ref Range    R (Reaction Time) K >17.0 (H) 4.6 - 9.1 min    K (Clot Kinetics) >5.0 (H) 0.8 - 2.1 min    ANGLE 43.0 (L) 63.0 - 78.0 deg    MA (Max Amplitude) K 41.0 (L) 52.0 - 69.0 mm    R (Reaction Time) KH 11.1  (H) 4.3 - 8.3 min    MA (Max Amplitude) RT 50.0 (L) 52.0 - 70.0 mm    MA ( Ramiro Amplitude) FF 8.0 (L) 15.0 - 32.0 mm    FLEV 157 (L) 278 - 581 mg/dL    Test Comment baseline    Blood Gas Arterial Full Panel Unsolicited   Result Value Ref Range    POCT pH, Arterial 7.10 (LL) 7.38 - 7.42 pH    POCT pCO2, Arterial 50 (H) 38 - 42 mm Hg    POCT pO2, Arterial 57 (L) 85 - 95 mm Hg    POCT SO2, Arterial 91 (L) 94 - 100 %    POCT Oxy Hemoglobin, Arterial 89.1 (L) 94.0 - 98.0 %    POCT Hematocrit Calculated, Arterial 34.0 (L) 41.0 - 52.0 %    POCT Sodium, Arterial 137 136 - 145 mmol/L    POCT Potassium, Arterial 4.2 3.5 - 5.3 mmol/L    POCT Chloride, Arterial 105 98 - 107 mmol/L    POCT Ionized Calcium, Arterial 1.27 1.10 - 1.33 mmol/L    POCT Glucose, Arterial 228 (H) 74 - 99 mg/dL    POCT Lactate, Arterial 11.1 (HH) 0.4 - 2.0 mmol/L    POCT Base Excess, Arterial -13.9 (L) -2.0 - 3.0 mmol/L    POCT HCO3 Calculated, Arterial 15.5 (L) 22.0 - 26.0 mmol/L    POCT Hemoglobin, Arterial 11.4 (L) 13.5 - 17.5 g/dL    POCT Anion Gap, Arterial 21 10 - 25 mmo/L    Patient Temperature 37.0 degrees Celsius    FiO2 100 %   Prepare Cryoprecipitated AHF (Pooled Units): 4 Pools   Result Value Ref Range    PRODUCT CODE V9444M14     Unit Number [id number]     Unit ABO O     Unit RH POS     Dispense Status IS     Blood Expiration Date March 21, 2024 19:45 EDT     PRODUCT BLOOD TYPE 5100     UNIT VOLUME 85     PRODUCT CODE A1850Y84     Unit Number [id number]     Unit ABO O     Unit RH POS     Dispense Status TR     Blood Expiration Date March 21, 2024 19:45 EDT     PRODUCT BLOOD TYPE 5100     UNIT VOLUME 75     PRODUCT CODE Q4281Y61     Unit Number [id number]     Unit ABO O     Unit RH POS     Dispense Status TR     Blood Expiration Date March 21, 2024 19:45 EDT     PRODUCT BLOOD TYPE 5100     UNIT VOLUME 85     PRODUCT CODE T5581U01     Unit Number [id number]     Unit ABO A     Unit RH POS     Dispense Status TR     Blood  Expiration Date March 21, 2024 16:52 EDT     PRODUCT BLOOD TYPE 6200     UNIT VOLUME 65    Prepare Plasma   Result Value Ref Range    PRODUCT CODE V5858K84     Unit Number [id number]     Unit ABO A     Unit RH POS     Dispense Status XM     Blood Expiration Date March 26, 2024 12:24 EDT     PRODUCT BLOOD TYPE 6200     UNIT VOLUME 218     PRODUCT CODE B6688O19     Unit Number [id number]     Unit ABO A     Unit RH POS     Dispense Status XM     Blood Expiration Date March 26, 2024 12:24 EDT     PRODUCT BLOOD TYPE 6200     UNIT VOLUME 264     PRODUCT CODE J5104A72     Unit Number [id number]     Unit ABO A     Unit RH POS     Dispense Status XM     Blood Expiration Date March 26, 2024 12:36 EDT     PRODUCT BLOOD TYPE 6200     UNIT VOLUME 318     PRODUCT CODE C8725H44     Unit Number [id number]     Unit ABO A     Unit RH POS     Dispense Status XM     Blood Expiration Date March 26, 2024 12:36 EDT     PRODUCT BLOOD TYPE 6200     UNIT VOLUME 328     PRODUCT CODE R0423O98     Unit Number [id number]     Unit ABO A     Unit RH NEG     Dispense Status XM     Blood Expiration Date March 26, 2024 12:36 EDT     PRODUCT BLOOD TYPE 0600     UNIT VOLUME 332    Prepare Platelets   Result Value Ref Range    PRODUCT CODE T1032S53     Unit Number [id number]     Unit ABO A     Unit RH POS     Dispense Status XM     Blood Expiration Date March 22, 2024 23:59 EDT     PRODUCT BLOOD TYPE 6200     UNIT VOLUME 276    Prepare RBC   Result Value Ref Range    PRODUCT CODE Z1847C83     Unit Number [id number]     Unit ABO O     Unit RH POS     XM INTEP COMP     Dispense Status RE     Blood Expiration Date April 09, 2024 23:59 EDT     PRODUCT BLOOD TYPE 5100     UNIT VOLUME 350     PRODUCT CODE K2916P43     Unit Number [id number]     Unit ABO O     Unit RH POS     XM INTEP COMP     Dispense Status RE     Blood Expiration Date April 10, 2024 23:59 EDT     PRODUCT BLOOD TYPE 5100     UNIT VOLUME 350     PRODUCT  CODE P5451P40     Unit Number [id number]     Unit ABO O     Unit RH POS     XM INTEP COMP     Dispense Status RE     Blood Expiration Date April 10, 2024 23:59 EDT     PRODUCT BLOOD TYPE 5100     UNIT VOLUME 278     PRODUCT CODE B8850J68     Unit Number [id number]     Unit ABO O     Unit RH POS     XM INTEP COMP     Dispense Status RE     Blood Expiration Date April 07, 2024 23:59 EDT     PRODUCT BLOOD TYPE 5100     UNIT VOLUME 293     PRODUCT CODE R7309Q29     Unit Number [id number]     Unit ABO O     Unit RH POS     XM INTEP COMP     Dispense Status RE     Blood Expiration Date April 13, 2024 23:59 EDT     PRODUCT BLOOD TYPE 5100     UNIT VOLUME 320    Blood Gas Arterial Full Panel Unsolicited   Result Value Ref Range    POCT pH, Arterial 7.15 (LL) 7.38 - 7.42 pH    POCT pCO2, Arterial 49 (H) 38 - 42 mm Hg    POCT pO2, Arterial 64 (L) 85 - 95 mm Hg    POCT SO2, Arterial 95 94 - 100 %    POCT Oxy Hemoglobin, Arterial 92.4 (L) 94.0 - 98.0 %    POCT Hematocrit Calculated, Arterial 32.0 (L) 41.0 - 52.0 %    POCT Sodium, Arterial 137 136 - 145 mmol/L    POCT Potassium, Arterial 3.9 3.5 - 5.3 mmol/L    POCT Chloride, Arterial 103 98 - 107 mmol/L    POCT Ionized Calcium, Arterial 1.27 1.10 - 1.33 mmol/L    POCT Glucose, Arterial 206 (H) 74 - 99 mg/dL    POCT Lactate, Arterial 11.1 (HH) 0.4 - 2.0 mmol/L    POCT Base Excess, Arterial -11.4 (L) -2.0 - 3.0 mmol/L    POCT HCO3 Calculated, Arterial 17.1 (L) 22.0 - 26.0 mmol/L    POCT Hemoglobin, Arterial 10.6 (L) 13.5 - 17.5 g/dL    POCT Anion Gap, Arterial 21 10 - 25 mmo/L    Patient Temperature 37.0 degrees Celsius    FiO2 100 %   Blood Gas Arterial Full Panel Unsolicited   Result Value Ref Range    POCT pH, Arterial 7.12 (LL) 7.38 - 7.42 pH    POCT pCO2, Arterial 52 (H) 38 - 42 mm Hg    POCT pO2, Arterial 48 (L) 85 - 95 mm Hg    POCT SO2, Arterial 84 (L) 94 - 100 %    POCT Oxy Hemoglobin, Arterial 81.7 (L) 94.0 - 98.0 %    POCT Hematocrit Calculated, Arterial  31.0 (L) 41.0 - 52.0 %    POCT Sodium, Arterial 139 136 - 145 mmol/L    POCT Potassium, Arterial 3.9 3.5 - 5.3 mmol/L    POCT Chloride, Arterial 102 98 - 107 mmol/L    POCT Ionized Calcium, Arterial 1.19 1.10 - 1.33 mmol/L    POCT Glucose, Arterial 200 (H) 74 - 99 mg/dL    POCT Lactate, Arterial 10.7 (HH) 0.4 - 2.0 mmol/L    POCT Base Excess, Arterial -12.1 (L) -2.0 - 3.0 mmol/L    POCT HCO3 Calculated, Arterial 16.9 (L) 22.0 - 26.0 mmol/L    POCT Hemoglobin, Arterial 10.2 (L) 13.5 - 17.5 g/dL    POCT Anion Gap, Arterial 24 10 - 25 mmo/L    Patient Temperature 37.0 degrees Celsius    FiO2 100 %   Coox Panel, Arterial Unsolicited   Result Value Ref Range    POCT Hemoglobin, Arterial 10.2 (L) 13.5 - 17.5 g/dL    POCT Oxy Hemoglobin, Arterial 81.7 (L) 94.0 - 98.0 %    POCT Carboxyhemoglobin, Arterial 1.6 %    POCT Methemoglobin, Arterial 1.2 0.0 - 1.5 %    POCT Deoxy Hemoglobin, Arterial 15.5 (H) 0.0 - 5.0 %   Blood Gas Arterial Full Panel Unsolicited   Result Value Ref Range    POCT pH, Arterial 7.17 (LL) 7.38 - 7.42 pH    POCT pCO2, Arterial 49 (H) 38 - 42 mm Hg    POCT pO2, Arterial 60 (L) 85 - 95 mm Hg    POCT SO2, Arterial 95 94 - 100 %    POCT Oxy Hemoglobin, Arterial 92.4 (L) 94.0 - 98.0 %    POCT Hematocrit Calculated, Arterial 29.0 (L) 41.0 - 52.0 %    POCT Sodium, Arterial 139 136 - 145 mmol/L    POCT Potassium, Arterial 4.0 3.5 - 5.3 mmol/L    POCT Chloride, Arterial 105 98 - 107 mmol/L    POCT Ionized Calcium, Arterial 1.22 1.10 - 1.33 mmol/L    POCT Glucose, Arterial 188 (H) 74 - 99 mg/dL    POCT Lactate, Arterial 10.5 (HH) 0.4 - 2.0 mmol/L    POCT Base Excess, Arterial -10.3 (L) -2.0 - 3.0 mmol/L    POCT HCO3 Calculated, Arterial 17.9 (L) 22.0 - 26.0 mmol/L    POCT Hemoglobin, Arterial 9.8 (L) 13.5 - 17.5 g/dL    POCT Anion Gap, Arterial 20 10 - 25 mmo/L    Patient Temperature 37.0 degrees Celsius    FiO2 100 %   Blood Gas Arterial Full Panel Unsolicited   Result Value Ref Range    POCT pH, Arterial 7.16  (LL) 7.38 - 7.42 pH    POCT pCO2, Arterial 50 (H) 38 - 42 mm Hg    POCT pO2, Arterial 62 (L) 85 - 95 mm Hg    POCT SO2, Arterial 95 94 - 100 %    POCT Oxy Hemoglobin, Arterial 92.6 (L) 94.0 - 98.0 %    POCT Hematocrit Calculated, Arterial 30.0 (L) 41.0 - 52.0 %    POCT Sodium, Arterial 140 136 - 145 mmol/L    POCT Potassium, Arterial 4.1 3.5 - 5.3 mmol/L    POCT Chloride, Arterial 104 98 - 107 mmol/L    POCT Ionized Calcium, Arterial 1.30 1.10 - 1.33 mmol/L    POCT Glucose, Arterial 183 (H) 74 - 99 mg/dL    POCT Lactate, Arterial 10.1 (HH) 0.4 - 2.0 mmol/L    POCT Base Excess, Arterial -10.6 (L) -2.0 - 3.0 mmol/L    POCT HCO3 Calculated, Arterial 17.8 (L) 22.0 - 26.0 mmol/L    POCT Hemoglobin, Arterial 10.0 (L) 13.5 - 17.5 g/dL    POCT Anion Gap, Arterial 22 10 - 25 mmo/L    Patient Temperature 37.0 degrees Celsius    FiO2 100 %   Blood Gas Arterial Full Panel Unsolicited   Result Value Ref Range    POCT pH, Arterial 7.23 (LL) 7.38 - 7.42 pH    POCT pCO2, Arterial 45 (H) 38 - 42 mm Hg    POCT pO2, Arterial 62 (L) 85 - 95 mm Hg    POCT SO2, Arterial 96 94 - 100 %    POCT Oxy Hemoglobin, Arterial 93.0 (L) 94.0 - 98.0 %    POCT Hematocrit Calculated, Arterial 29.0 (L) 41.0 - 52.0 %    POCT Sodium, Arterial 141 136 - 145 mmol/L    POCT Potassium, Arterial 4.1 3.5 - 5.3 mmol/L    POCT Chloride, Arterial 104 98 - 107 mmol/L    POCT Ionized Calcium, Arterial 1.28 1.10 - 1.33 mmol/L    POCT Glucose, Arterial 171 (H) 74 - 99 mg/dL    POCT Lactate, Arterial 9.9 (HH) 0.4 - 2.0 mmol/L    POCT Base Excess, Arterial -8.4 (L) -2.0 - 3.0 mmol/L    POCT HCO3 Calculated, Arterial 18.8 (L) 22.0 - 26.0 mmol/L    POCT Hemoglobin, Arterial 9.7 (L) 13.5 - 17.5 g/dL    POCT Anion Gap, Arterial 22 10 - 25 mmo/L    Patient Temperature 37.0 degrees Celsius    FiO2 100 %   Blood Gas Arterial Full Panel Unsolicited   Result Value Ref Range    POCT pH, Arterial 7.25 (LL) 7.38 - 7.42 pH    POCT pCO2, Arterial 45 (H) 38 - 42 mm Hg    POCT pO2,  Arterial 60 (L) 85 - 95 mm Hg    POCT SO2, Arterial 95 94 - 100 %    POCT Oxy Hemoglobin, Arterial 92.1 (L) 94.0 - 98.0 %    POCT Hematocrit Calculated, Arterial 29.0 (L) 41.0 - 52.0 %    POCT Sodium, Arterial 140 136 - 145 mmol/L    POCT Potassium, Arterial 4.4 3.5 - 5.3 mmol/L    POCT Chloride, Arterial 103 98 - 107 mmol/L    POCT Ionized Calcium, Arterial 1.15 1.10 - 1.33 mmol/L    POCT Glucose, Arterial 172 (H) 74 - 99 mg/dL    POCT Lactate, Arterial 9.7 (HH) 0.4 - 2.0 mmol/L    POCT Base Excess, Arterial -7.2 (L) -2.0 - 3.0 mmol/L    POCT HCO3 Calculated, Arterial 19.7 (L) 22.0 - 26.0 mmol/L    POCT Hemoglobin, Arterial 9.7 (L) 13.5 - 17.5 g/dL    POCT Anion Gap, Arterial 22 10 - 25 mmo/L    Patient Temperature 37.0 degrees Celsius    FiO2 100 %     *Note: Due to a large number of results and/or encounters for the requested time period, some results have not been displayed. A complete set of results can be found in Results Review.       Transthoracic Echo (TTE) Limited    Result Date: 3/21/2024   [facility], [address] [place] [address]                Tel [phone] and Fax [phone] TRANSTHORACIC ECHOCARDIOGRAM REPORT  Patient Name:      YVETTE Clinton Physician:    [id number] Derrell Negron MD Study Date:        3/21/2024            Ordering Provider:    [id number] KELLY ELISE MRN/PID:           [id number]             Fellow:               [id number] Alessandro Wise MD Accession#:        [id number]         Nurse: Date of Birth/Age: 1960 / 63 years Sonographer:          Katelyn Aguiar RDCS, RVT Gender:            M                    Additional Staff: Height:            177.80 cm            Admit Date:           2/28/2024 Weight:             100.70 kg            Admission Status:     Inpatient - STAT BSA / BMI:         2.18 m2 / 31.85      Encounter#:           [id number]                    kg/m2                                         Department Location:  [facility] Blood Pressure: 119 /55 mmHg Study Type:    TRANSTHORACIC ECHO (TTE) LIMITED Diagnosis/ICD: Cardiac arrest, cause unspecified-I46.9; Other pulmonary embolism                without acute cor pulmonale-I26.99 Indication:    Status post cardiac arrest. PE. Right heart strain. CPT Code:      Echo Limited-89682; Color Doppler-20871; Doppler Limited-62798 Patient History: Pertinent History: Myxoid chondrosarcoma. HTN. DM. HLD. Study Detail: The following Echo studies were performed: 2D, M-Mode, Doppler and               color flow. Technically challenging study due to patient lying in               supine position and body habitus. Definity used as a contrast               agent for endocardial border definition.  PHYSICIAN INTERPRETATION: Left Ventricle: The left ventricular systolic function is normal. There are no regional wall motion abnormalities. The left ventricular cavity size is normal. Left ventricular diastolic filling was not assessed. Left Atrium: The left atrium is normal in size. Right Ventricle: The right ventricle is moderately enlarged. There is low normal right ventricular systolic function. There is a slight suggestion of a Townsend's sign on the apical views suggestive of RV strain although the function is low normal. Right Atrium: The right atrium is mildly dilated. Aortic Valve: The aortic valve is trileaflet. There is trivial aortic valve regurgitation. Mitral Valve: The mitral valve is normal in structure. Mitral valve regurgitation was not assessed. Tricuspid Valve: The tricuspid valve is structurally normal. There is trace tricuspid regurgitation. The right ventricular systolic pressure is unable to be estimated. Pulmonic Valve: The pulmonic valve is not  well visualized. The pulmonic valve regurgitation was not well visualized. Pericardium: There is a trivial pericardial effusion. Aorta: The aortic root is normal. Systemic Veins: The inferior vena cava appears to be of normal size. There is IVC inspiratory collapse greater than 50%. In comparison to the previous echocardiogram(s): There are no prior studies on this patient for comparison purposes.  CONCLUSIONS:  1. Left ventricular systolic function is normal.  2. Poorly visualized anatomical structures due to suboptimal image quality.  3. Moderately enlarged right ventricle.  4. There is a slight suggestion of a Townsend's sign on the apical views suggestive of RV strain although the function is low normal.  5. There is low normal right ventricular systolic function. QUANTITATIVE DATA SUMMARY: 2D MEASUREMENTS:                          Normal Ranges: Ao Root d:     3.50 cm   (2.0-3.7cm) LAs:           3.00 cm   (2.7-4.0cm) IVSd:          1.30 cm   (0.6-1.1cm) LVPWd:         1.00 cm   (0.6-1.1cm) LVIDd:         4.80 cm   (3.9-5.9cm) LVIDs:         2.90 cm LV Mass Index: 94.6 g/m2 LV % FS        39.6 % RA VOLUME BY A/L METHOD:                       Normal Ranges: RA Area A4C: 17.5 cm2 M-MODE MEASUREMENTS:                Normal Ranges: RVIDd: 3.50 cm (0.9-3.6cm) LV SYSTOLIC FUNCTION BY 2D PLANIMETRY (MOD):                     Normal Ranges: EF-A2C View: 59.5 %  RIGHT VENTRICLE: RV Basal 4.80 cm RV Mid   3.70 cm RV Major 8.0 cm TAPSE:   17.0 mm RV s'    0.12 m/s  [id number] Derrell Negron MD Electronically signed on 3/21/2024 at 9:21:52 AM  ** Final **     Electrocardiogram, 12-lead PRN ACS symptoms    Result Date: 3/21/2024  Sinus tachycardia Left axis deviation Right bundle branch block Inferior infarct (cited on or before 20-MAR-2024) Anterolateral infarct (cited on or before 20-MAR-2024) Abnormal ECG When compared with ECG of 20-MAR-2024 18:41, No significant change was found    Electrocardiogram, 12-lead PRN ACS  symptoms    Result Date: 3/21/2024  Sinus tachycardia Left axis deviation Right bundle branch block Inferior infarct (cited on or before 20-MAR-2024) Anteroseptal infarct (cited on or before 20-MAR-2024) Abnormal ECG When compared with ECG of 20-MAR-2024 16:21, (unconfirmed) Serial changes of Anteroseptal infarct Present    XR chest 1 view    Result Date: 3/21/2024  Interpreted By:  Nadege Parrish and Afshari Mirak Sohrab STUDY: XR CHEST 1 VIEW;  3/21/2024 3:16 am   INDICATION: Signs/Symptoms:daily icu.   COMPARISON: Chest x-ray 03/20/2024.   ACCESSION NUMBER(S): [id number]   ORDERING CLINICIAN: NILS LE   FINDINGS: AP radiograph of the chest was provided.   Endotracheal tube tip is approximately 4.2 cm above the ariadna.   An enteric tube courses below diaphragm with the tip beyond the field of view.   CARDIOMEDIASTINAL SILHOUETTE: Cardiomediastinal silhouette is normal in size and configuration.   LUNGS: Similar aeration of bilateral lungs with prominent perihilar interstitial markings. Left costophrenic angle is obscured by overlying cardiomediastinal silhouette. No focal consolidation, sizeable pleural effusion or pneumothorax.   ABDOMEN: No remarkable upper abdominal findings.   BONES: No acute osseous changes.       1. Persistent pulmonary edema. Left costophrenic angle is obscured which can be due to overlying cardiomediastinal silhouette versus small pleural effusion. No focal consolidation or pneumothorax.   I personally reviewed the images/study and I agree with the findings as stated by resident physician Dr. Ga Noel . This study was interpreted at [place], [place].   MACRO: None   Signed by: Nadege Parrish 3/21/2024 8:19 AM Dictation workstation:   [id number]    XR abdomen 1 view    Result Date: 3/21/2024  Interpreted By:  Nadege Parrish and Ohs Zachary STUDY: XR ABDOMEN 1 VIEW;  3/20/2024 7:15 pm   INDICATION:  Signs/Symptoms:enteral tube placement.   COMPARISON: Chest radiograph 03/06/2024.   ACCESSION NUMBER(S): [id number]   ORDERING CLINICIAN: GILSON DUTTON   FINDINGS: Enteric tube with distal tip and side port overlying the expected location of the stomach.   Nonobstructive bowel gas pattern. No evidence of intra-abdominal free air.   Visualized soft tissues and osseous structures are unremarkable.       1. Enteric tube with distal tip and side port overlying the expected location of the stomach. 2. Nonobstructive bowel gas pattern.   I personally reviewed the images/study and I agree with the findings as stated by Dr. Selvin Marcial. This study was interpreted at [place], [place].   MACRO: none   Signed by: Nadege Parrish 3/21/2024 8:19 AM Dictation workstation:   [id number]    XR chest 1 view    Result Date: 3/21/2024  Interpreted By:  Nadege Parrish and Ohs Zachary STUDY: XR CHEST 1 VIEW;  3/20/2024 7:04 pm   INDICATION: Signs/Symptoms:intubation s/p cardiac arrest.   COMPARISON: Chest radiograph 03/06/2024, CT chest 01/31/2024.   ACCESSION NUMBER(S): [id number]   ORDERING CLINICIAN: GILSON DUTTON   FINDINGS: AP radiograph of the chest was provided.   MEDICAL DEVICES: ETT distal tip 4.2 cm from the ariadna. Enteric tube overlies the expected location of the esophagus and stomach with distal tip not imaged.   CARDIOMEDIASTINAL SILHOUETTE: Heart is similarly enlarged when compared to prior chest radiograph 03/06/2024.   LUNGS: Low lung volumes contribute to bronchovascular crowding. Interval increase in perihilar vascular congestion. Left costophrenic angle is obscured by overlying cardiac silhouette and/or effusion. The right costophrenic angle is clear. No focal consolidation or pneumothorax.   ABDOMEN: No remarkable upper abdominal findings.   BONES: No acute osseous changes.       1. Interval increase in perihilar vascular congestion. 2. Left costophrenic angle is  obscured by overlying cardiac silhouette and/or pleural effusion. 3. Medical devices as above.   I personally reviewed the images/study and I agree with the findings as stated by Dr. Selvin Marcial. This study was interpreted at [facility], [place], [place].   MACRO: None   Signed by: Nadege Parrish 3/21/2024 8:18 AM Dictation workstation:   [id number]     Scheduled medications  albumin human, , ,   albumin human, 25 g, intravenous, Once  calcium chloride, 1 g, intravenous, Once  calcium chloride, 1 g, intravenous, Once  oxygen, , inhalation, Continuous - Inhalation  pantoprazole, 40 mg, intravenous, Daily  piperacillin-tazobactam, 3.375 g, intravenous, q6h  sodium bicarbonate, , ,   sulfur hexafluoride microsphr, 2 mL, intravenous, Once in imaging  vancomycin, 750 mg, intravenous, q12h      Continuous medications  angiotensin II (Giapreza) 2.5 mg in sodium chloride 0.9% 250 mL (0.01 mg/mL) infusion, 1.25-40 ng/kg/min, Last Rate: 40 ng/kg/min (03/22/24 0613)  EPINEPHrine, 0-2 mcg/kg/min  epoprostenol, 0.05 mcg/kg/min (Ideal), Last Rate: 0.05 mcg/kg/min (03/22/24 0227)  fentaNYL,  mcg/hr, Last Rate: 50 mcg/hr (03/22/24 0600)  insulin regular infusion for cardiac surgery, 0-15 Units/hr, Last Rate: 2 Units/hr (03/22/24 0603)  norepinephrine, 0.01-3.3 mcg/kg/min  PrismaSol BGK 2/3.5, 2,400 mL/hr, Last Rate: 2,400 mL/hr (03/22/24 0230)  propofol, 5-20 mcg/kg/min, Last Rate: 20 mcg/kg/min (03/22/24 0600)  sodium bicarbonate, 125 mL/hr, Last Rate: 125 mL/hr (03/22/24 0600)  vasopressin, 0.01-0.06 Units/min, Last Rate: 0.06 Units/min (03/22/24 0613)      PRN medications  PRN medications: albumin human, calcium gluconate, calcium gluconate, dextrose, dextrose, dextrose **OR** glucagon, glucagon, HYDROmorphone, insulin regular, magnesium sulfate, magnesium sulfate, potassium chloride, potassium chloride, sodium bicarbonate, vancomycin             Assessment/Plan   Principal Problem:     Soft tissue sarcoma (CMS/HCC)  Active Problems:    Extraskeletal myxoid chondrosarcoma (CMS/HCC)    Cardiopulmonary arrest (CMS/HCC)    Assessment:  Jason Hollins is a 63 y.o. 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 Hawkins and Dr. Smith on 3/5/24.  Post operative course was uncomplicated and patient was on RNF until 3/20 for prolonged bed rest to avoid hip flexion to maintain flap integrity.  Patient was on prophylactic lovenox while on bedrest.     3/20: Now s/p CPR with ROSC after TPA, overnight started on heparin, epo, increased pressor requirements, increased swelling at flap site.   3/21: MTP transfusions. Firm and large right flap site. Return to OR for exploration of R Flap site and evacuated 5L. High pressors (AT 2. Epi, norepi, vaso). On epo.   3/22: Remains on AT 2. Epi, norepi, vaso. FiO2 100%. Epo 0.05. Lactate low-teens. VAC and ANDERSON x 3 with minimal output.         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 Hawkins and Dr. Smith on 3/5/24. Now intubated.   - sedation to tolerate ETT   - close neuro monitoring  - PT/OT consult when appropriate  - consider BIS and paralytic     CV: History of HTN, HLD. Acute cardiopulmonary arrest 2/2 to possible massive PE vs massive STEMI, received multiple rounds of CPR and one defibrillation.  Sustained ROSC achieved after TPA bolus. On high dose levophed, epinephrine, vaso, angioT2. Plan on 3/21 was for ECMO which was aborted secondary to large hemhorrge at right flap site. Had MTP and taken OR with 5L evacuated.   ECHO 3/21 (on 4 pressors) with suboptimal images: Normal  LV, Mod enlarged RV, slight suggestion of a Townsend's sign / RV strain, low normal RV function.  ECHO 3/21 with hyperdynamic LV  - continuous EKG/abp monitoring  - Goal map range 65-90  - wean pressors as able   - start stress steroids     PULM: Arrived to SICU intubated julio c-CPR. Concern for massive PE and so epo started. On 100%, PEEP 12  - Wean FiO2 and epo as tolerated.    - Frequent ABG  - consider BIS and paralytic  - additional pulm toilet prn.       GI: Transaminitis with elevated amylase / lipase.  - NPO   - PPI  - Trend LFTs  - RUQ ultrasound  - consult ACS for potential bowel ischemia as cause of persistent lactic acidosis  - check bladder pressure     : No history of renal disease. Baseline creatinine 0.6. Not making urine. 10.5L positive past 48hrs.  - Marino   - I/Os  - CVVH  - RFP BID and PRN  - Replete electrolytes to goal K>4, Mg>2, Phos>2.5, ionized Ca>1.10.  - Concentrate all drips  - Bicarb infusion, concentrate today  - Nephrology following  - Renal US  - Creatine kinase level     HEME: Patient was on ppx lovenox for DVT prophylaxis, concern for massive PE patient received bolus of TPA during CPR. Started on heparin infusion overnight given concern for PE. With bleeding this AM was given protamine. MTP and back to OR with 5L EBL from groin site. HGB has remained stable since OR on 3/22  - Cryo x 2 and FFP x 1 given TEG overnight  - CBC, coags, I-Aureliano, fibrinogen q 4hrs until proven stable then can liberalize  - ongoing monitoring for s/s bleeding  -q 4 hr cbc with coags every other    ENDO: History of DM2.   - continue insulin drip, transition off when more stable     ID:    - temp q4h, wbc daily  - ongoing monitoring for s/s infection  - zosyn and vancomycin  - send urine and blood culture x 2  - tracheal aspirate  - pan scan today     Lines:  - right radial arterial line  - left femoral trialysis  - left subclavian central line  - PIV     Dispo: ICU. Patient seen and discussed with ICU  attending Dr. Rouse.     [facility] phone [phone]    Fernandez Ríos, APRN-CNP, DNP

## 2024-03-22 NOTE — CARE PLAN
CONSENT FOR RENAL REPLACEMENT THERAPY- CONTINUOUS RENAL REPLACEMENT THERAPY/ HEMODIALYSIS    PATIENT NAME: YVETTE HOLLINS  MRN: [id number]    PROCEDURE:     This procedure has to be performed on an on-going basis with no definite end date. By giving consent to this procedure, the patient understands that consent for this procedure is good until there is a change in the patient's condition that warrants a new consent, and covers each time they have the procedure done, as ordered by the medical physician or other practitioner.     Benefits: removal of waste products from the blood, removal of excess fluid from the body, correction of abnormal mineral levels in the blood, administration of medications to improve anemia and bone disease.     Risk/common: Low blood pressure, nausea, vomiting, muscle cramps, loss of blood in the circuit, bleeding from access site after needles are removed.   Risk/rare: allergic reaction to dialyzer or to medications, severe blood loss due to disconnection of dialysis circuit.       The spouse understands that ....................Dr. Kemp....................... is the nephrologist ordering the dialysis treatments. These treatments will be performed by members of the [facility] dialysis team, consisting of nurses and technicians.     The patient's wife was informed of and understands the nature of his/her medical condition, the benefits and risks of the therapeutic procedure described above, the alternatives to the procedure, and the possible consequences to their health if the procedure is not done. The patient recognizes that there may be complications and that different or additional procedures may be performed depending on findings or events that may happen during the procedure.       Consent was obtained from the patient/legal representative/family member (Lakisha Hollins) over the phone at this date and time:   DATE: 3/22/2024  TIME: 1: 55 AM    Nephrology fellow:   Marlen Aguilera MD    Witnessed by my colleague Dr. Joby Baez

## 2024-03-22 NOTE — PROGRESS NOTES
"Jason Hollins is a 63 y.o. male on day 15 of admission presenting with Soft tissue sarcoma (CMS/HCC).     Transitional Care Coordinator prior Note: Met with patient to discuss discharge planning s/p admission. Spoke with the patient's spouse who provided choices, 1. [facility], 2. [facility] [place] 3. [facility]. Patient is currently on bedrest until 3/19. TCC team will continue to follow. Chanel Lama RN TCC via Epic.     3/19/24  1633  Pt has been accepted at [facility].  SW relayed above to pt.  He would like to explore [facility] and [facility].  [facility] unable to accept due to only accepting pt 65+.  [facility] asking about cancer treatment plan while at SNF.  Will reach out to team for answers to questions.  Will follow.  COOPER Francois        3/20/24  1103  Pt has been accepted at [facility].  SW relayed this information to pt.  He is going to speak with his family.  Pt also reports he wants to get out of bed with PT/OT as therapy was not able to get out of bed 3/19.  [facility] is another accepting SNF.  Will sent therapy notes to both facilities.  COOPER Francois    03/22 1045  Services for patient keep changing now entirely on Plastics (CTICU) and per today's Plastic Surgery -- \"Remains critically ill in CTICU,  CRRT initiated overnight. Remains intubated/sedated with levo, vaso, epi, angiotensin II running Overnight Events Dialysis line placed, CVVHD initiated. Reviewed above notes copied to this progress note.... +added updated clinicals in [facility] and provided SNFs - [facility] update. To continue to follow and if SNF still dc plan, obtain foc from patient/family when appropritate.   Chantelle Ramirez (LSW, MSW)       "

## 2024-03-22 NOTE — PROCEDURES
Arterial Puncture/Cannulation    Date/Time: 3/22/2024 8:01 AM    Performed by: Krunal Moreno MD  Authorized by: Krunal Moreno MD  Consent: The procedure was performed in an emergent situation.  Required items: required blood products, implants, devices, and special equipment available  Patient identity confirmed: provided demographic data  Preparation: head covering mask, sterile gloves, draped, chlorhexidine x 2.  Local anesthesia used: no    Anesthesia:  Local anesthesia used: no    Sedation:  Patient sedated: no    Comments: Patient immediately/intermittently post ROSC from cardiac arrest. Invasive access deemed necessary for escalation of monitoring; no palpable pulses peripherally. The patient's right arm was supinated and padded to facilitate cannulation. Usual sterile precautions including head covering, mask, sterile gloves, chlorhexidine prep x 2 used. An ultrasound was used to visualize the target vessel. A steel needle was used to access the right brachial artery on the first pass. Bright, pulsatile blood noted. A guidewire was advanced into the vessel lumen. Following this, the needle was withdrawn, and a 20 gauge, 12cm angiocatheter was advanced into the right brachial artery. Good waveform; no hematoma; secured in place with sterile chlorhexidine impregnated Tegaderm.   Given that this line was placed while the patient was in extremis, although sterile technique was observed, would recommend consideration of re-site/replacement when clinically feasible  -Josh LEWIS

## 2024-03-22 NOTE — PROGRESS NOTES
"Jason Hollins is a pleasant 63 y.o. male on day 17 of admission presenting with Soft tissue sarcoma (CMS/HCC).    Subjective   Return to OR with plastics for control of hemorrhage from gluteal perforators. No further issues overnight per discussion with nursing.       Objective   Last Recorded Vitals  Blood pressure (!) 131/41, pulse (!) 118, temperature 36.8 °C (98.2 °F), temperature source Bladder, resp. rate (!) 30, height 1.778 m (5' 10\"), weight 129 kg (284 lb 2.8 oz), SpO2 96 %.    Intake/Output last 3 Shifts:  I/O last 3 completed shifts:  In: 55243.5 (140.7 mL/kg) [I.V.:5363 (41.6 mL/kg); Blood:34422.6; IV Piggyback:2625.9]  Out: 7270 (56.4 mL/kg) [Urine:270 (0.1 mL/kg/hr); Drains:490; Blood:6510]  Weight: 128.9 kg     Physical Exam  CONSTITUTIONAL: Alert and oriented. No acute distress.  EYES: No scleral icterus. Conjunctiva clear.  RESPIRATORY: Normal effort on room air. No stridor.  ABDOMINAL: Slightly protuberant. Nondistended.   MUSCULOSKELETAL: Normal strength and tone.  SKIN: Normal tugor. Right thigh and groin with drains and Prevena in place.  EXTREMITIES: No gross extremity deformity.  NEUROLOGICAL: Grossly intact. No focal deficits.     CARDIAC: Regular rate and rhythm on the monitor.  VASCULAR:  Capillary refill < 2 sec  No lower extremity edema  Trace lower extremity edema  Palpable right dorsalis pedis pulse  Palpable left dorsalis pedis pulse  Right femoral arterial and venous sheaths in place    Relevant Results  Medications:  Scheduled Meds:calcium chloride, 1 g, intravenous, Once  hydrocortisone sodium succinate, 50 mg, intravenous, q6h  hydroxocobalamin, 5 g, intravenous, Once  oxygen, , inhalation, Continuous - Inhalation  pantoprazole, 40 mg, intravenous, Daily  piperacillin-tazobactam, 3.375 g, intravenous, q6h  sulfur hexafluoride microsphr, 2 mL, intravenous, Once in imaging  vancomycin, 750 mg, intravenous, q12h      Continuous Infusions:angiotensin II (Giapreza) 2.5 mg in sodium " chloride 0.9% 250 mL (0.01 mg/mL) infusion, 1.25-40 ng/kg/min, Last Rate: 40 ng/kg/min (03/22/24 0700)  EPINEPHrine, 0-2 mcg/kg/min (Dosing Weight)  epoprostenol, 0.05 mcg/kg/min (Ideal), Last Rate: 0.05 mcg/kg/min (03/22/24 0227)  fentaNYL,  mcg/hr, Last Rate: 75 mcg/hr (03/22/24 1010)  insulin regular infusion for cardiac surgery, 0-15 Units/hr, Last Rate: 3.1 Units/hr (03/22/24 0800)  lactated Ringer's, 5 mL/hr  lactated Ringer's, 5 mL/hr  norepinephrine, 0.1-3 mcg/kg/min (Dosing Weight)  PrismaSol BGK 2/3.5, 2,400 mL/hr, Last Rate: 2,400 mL/hr (03/22/24 0907)  propofol, 5-25 mcg/kg/min (Dosing Weight)  sodium bicarbonate 250 mEq in dextrose 5 % in water (D5W) 500 mL (0.5 mEq/mL) infusion, 25 mEq/hr  vasopressin, 0.01-0.06 Units/min, Last Rate: 0.06 Units/min (03/22/24 0700)      PRN Meds:.PRN medications: calcium gluconate, calcium gluconate, dextrose, dextrose, dextrose **OR** glucagon, glucagon, HYDROmorphone, insulin regular, magnesium sulfate, magnesium sulfate, potassium chloride, potassium chloride, vancomycin    Labs:  Results from last 7 days   Lab Units 03/22/24  0549 03/22/24  0055 03/21/24  1831   SODIUM mmol/L 143 144 145   POTASSIUM mmol/L 5.0 5.2 4.5   CHLORIDE mmol/L 98 99 100   BUN mg/dL 33* 34* 28*   CREATININE mg/dL 3.23* 3.14* 2.64*     Results from last 7 days   Lab Units 03/22/24  0549 03/22/24  0055 03/21/24  1831   WBC AUTO x10*3/uL 8.8 7.6 5.3   HEMOGLOBIN g/dL 9.0* 9.6* 9.7*   HEMATOCRIT % 25.4* 27.1* 28.1*   PLATELETS AUTO x10*3/uL 90* 78* 64*       Imaging:  Imaging from the last 24 hours reviewed independently by the vascular surgery team.        Assessment/Plan   Jason Hollins is a pleasant 63 y.o. male with hemorrhage after recent flap and PEA arrest with massive PE.  Return to OR 3/21 for control of hemorrhage with plastic surgery. Source of bleeding seems to have been gluteal perforators. Hemoglobin stabilized.      Plan:  Management of right femoral arterial and venous sheaths  per cardiology    Vascular surgery will sign off at this time. Please do not hesitate to contact us with any questions, concerns, or changes in the patient's clinical condition. Thank you for the consultation and the opportunity to participate in the patient's care.          Patient seen and discussed with the attending, Dr. Mccloud.    Michael Ferguson MD  Vascular Surgery Fellow  Team Pager [phone]  Available on Secure Chat

## 2024-03-22 NOTE — PROGRESS NOTES
Department of Plastic and Reconstructive Surgery  Post Op Check    63 y.o. 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 Hawkins and Dr. Smith on 3/5/24.  Post operative course was uncomplicated and patient was on RNF until 3/20 for prolonged bed rest to avoid hip flexion to maintain flap integrity.  Patient was on prophylactic lovenox while on bedrest. Per nursing report, patient was getting to the side of the bed today to get up and walk when he had sudden chest heaviness. Pre-cardiac arrest EKG with signs of anterior ischemia. Nursing was called by family because patient went unresponsive.  ACLS was immediately started. ROSC was achieved after 1 round of CPR, however patient quickly decompensated into PEA.  Airway was secured.  Multiple rounds of CPR were completed with intermittent ROSC prior to transport to CTICU.  Patient arrived to CTICU under SICU care with active CPR underway.  Primary concern for STEMI vs massive PE.  Bedside POCUS TTE with biventricular failure and dilated RV.  RV septal bowing. Decided to give TPA to treat massive PE.  After several minutes of CPR patient achieved  sustained ROSC.  See code documentation note for further details.     3/20: Now s/p CPR with ROSC after TPA, overnight started on heparin, epo, increased pressor requirements, increased swelling at flap site.   3/21: MTP transfusions. Return to OR for exploration of R Flap site now s/p Exploratin of right thigh wound evacuation of hematoma. Suture ligation of multiple bleeding vessel and several points in gluteal area with Dr. Smith.    Subjective: Remains critically ill in CTICU, high dose pressors (Levo/Vaso/Epi), Angiotensin II, intubated and sedated.     Objective:   PE:  Constitutional: Intubated and sedated  ENMT: moist  mucous membranes, ETT in place, NGT in place  Cardiovascular: Tachycardia, normal rate and regular rhythm. No murmurs, rubs, or gallops.  Respiratory/Thorax: ETT in place to vent, Rhonchi   Gastrointestinal: abdomen soft, NTND, +BS x 4  Genitourinary: indwelling daniels with hematuria, oliguric  Musculoskeletal: Sedated  Extremities: Generalized edema, Right 4Fr CFA and 7Fr CFV sheath in place  RLE: right thigh edematous, large, no bruising noted, soft and compressible, incisional wound vac in place, holding suction at -75 mmHG, no leak or alarm present, ANDERSON drain x 3 with sanguinous output  BLE neurovascular intact, cap refill < 2 sec, +SILT, +df/pf, DP/PT/ radial pulses 2+, no drainage noted.   Neurological:  HANNAH, intubated and sedated  Psychological: HANNAH, intubated and sedated  Skin: Cool to touch    S/p Exploratin of right thigh wound evacuation of hematoma. Suture ligation of multiple bleeding vessel and several points in gluteal area:  A/P:   - remains critically ill in CTICU on high dose pressors (Levo/Vaso/Epi/AT2)  - maintain prevena incisional wound vac at -75 mmHg, notify plastics if alarm or leak  - monitor right thigh for worsening s/s of active bleeding  - monitor ANDERSON drain output, strip drain Q4, notify plastics if ANDERSON drain output exceeds > 100 ml/hr in one drain or > 300 ml/hr collectively  - Operative findings: Actively Bleeding Gluteal Arterial Perforators, Generalized Oozing, healthy Flap, EBL 6.5L (3L of which was retained Hematoma)   - continue IV abx  - Appreciate remaining care per CTICU  - family updated at bedside  - Discussed plan with Dr. Smith  - Plastics to follow    PHILLIP Colindres-CNP  Plastic and Reconstructive Surgery   Available via Haiku  Pager #[phone]  Team phone: [phone]

## 2024-03-23 ENCOUNTER — APPOINTMENT (OUTPATIENT)
Dept: RADIOLOGY | Facility: HOSPITAL | Age: 64
DRG: 003 | End: 2024-03-23
Payer: COMMERCIAL

## 2024-03-23 LAB
ALBUMIN SERPL BCP-MCNC: 2.8 G/DL (ref 3.4–5)
ALBUMIN SERPL BCP-MCNC: 2.9 G/DL (ref 3.4–5)
ALBUMIN SERPL BCP-MCNC: 2.9 G/DL (ref 3.4–5)
ALP SERPL-CCNC: 90 U/L (ref 33–136)
ALT SERPL W P-5'-P-CCNC: 1826 U/L (ref 10–52)
AMYLASE SERPL-CCNC: 437 U/L (ref 29–103)
ANION GAP BLDA CALCULATED.4IONS-SCNC: 16 MMO/L (ref 10–25)
ANION GAP BLDA CALCULATED.4IONS-SCNC: 18 MMO/L (ref 10–25)
ANION GAP BLDA CALCULATED.4IONS-SCNC: 19 MMO/L (ref 10–25)
ANION GAP BLDA CALCULATED.4IONS-SCNC: 20 MMO/L (ref 10–25)
ANION GAP BLDA CALCULATED.4IONS-SCNC: 21 MMO/L (ref 10–25)
ANION GAP BLDA CALCULATED.4IONS-SCNC: 22 MMO/L (ref 10–25)
ANION GAP SERPL CALC-SCNC: 23 MMOL/L (ref 10–20)
ANION GAP SERPL CALC-SCNC: 23 MMOL/L (ref 10–20)
ANION GAP SERPL CALC-SCNC: 24 MMOL/L (ref 10–20)
APTT PPP: 34 SECONDS (ref 27–38)
APTT PPP: 36 SECONDS (ref 27–38)
AST SERPL W P-5'-P-CCNC: 2289 U/L (ref 9–39)
BASE EXCESS BLDA CALC-SCNC: -3.5 MMOL/L (ref -2–3)
BASE EXCESS BLDA CALC-SCNC: -4 MMOL/L (ref -2–3)
BASE EXCESS BLDA CALC-SCNC: -4.3 MMOL/L (ref -2–3)
BASE EXCESS BLDA CALC-SCNC: -4.6 MMOL/L (ref -2–3)
BASE EXCESS BLDA CALC-SCNC: -5.3 MMOL/L (ref -2–3)
BASE EXCESS BLDA CALC-SCNC: -5.5 MMOL/L (ref -2–3)
BASE EXCESS BLDA CALC-SCNC: -5.5 MMOL/L (ref -2–3)
BASE EXCESS BLDA CALC-SCNC: -5.7 MMOL/L (ref -2–3)
BILIRUB DIRECT SERPL-MCNC: 2.9 MG/DL (ref 0–0.3)
BILIRUB SERPL-MCNC: 4.9 MG/DL (ref 0–1.2)
BODY TEMPERATURE: 37 DEGREES CELSIUS
BUN SERPL-MCNC: 24 MG/DL (ref 6–23)
BUN SERPL-MCNC: 27 MG/DL (ref 6–23)
BUN SERPL-MCNC: 28 MG/DL (ref 6–23)
CA-I BLD-SCNC: 1.05 MMOL/L (ref 1.1–1.33)
CA-I BLD-SCNC: 1.1 MMOL/L (ref 1.1–1.33)
CA-I BLDA-SCNC: 1.06 MMOL/L (ref 1.1–1.33)
CA-I BLDA-SCNC: 1.07 MMOL/L (ref 1.1–1.33)
CA-I BLDA-SCNC: 1.09 MMOL/L (ref 1.1–1.33)
CA-I BLDA-SCNC: 1.1 MMOL/L (ref 1.1–1.33)
CA-I BLDA-SCNC: 1.1 MMOL/L (ref 1.1–1.33)
CA-I BLDA-SCNC: 1.15 MMOL/L (ref 1.1–1.33)
CALCIUM SERPL-MCNC: 8.2 MG/DL (ref 8.6–10.6)
CALCIUM SERPL-MCNC: 8.3 MG/DL (ref 8.6–10.6)
CALCIUM SERPL-MCNC: 8.3 MG/DL (ref 8.6–10.6)
CHLORIDE BLDA-SCNC: 100 MMOL/L (ref 98–107)
CHLORIDE BLDA-SCNC: 101 MMOL/L (ref 98–107)
CHLORIDE BLDA-SCNC: 97 MMOL/L (ref 98–107)
CHLORIDE BLDA-SCNC: 98 MMOL/L (ref 98–107)
CHLORIDE BLDA-SCNC: 99 MMOL/L (ref 98–107)
CHLORIDE SERPL-SCNC: 96 MMOL/L (ref 98–107)
CHLORIDE SERPL-SCNC: 96 MMOL/L (ref 98–107)
CHLORIDE SERPL-SCNC: 97 MMOL/L (ref 98–107)
CK SERPL-CCNC: 2746 U/L (ref 0–325)
CO2 SERPL-SCNC: 20 MMOL/L (ref 21–32)
CO2 SERPL-SCNC: 21 MMOL/L (ref 21–32)
CO2 SERPL-SCNC: 22 MMOL/L (ref 21–32)
CREAT SERPL-MCNC: 2.34 MG/DL (ref 0.5–1.3)
CREAT SERPL-MCNC: 2.58 MG/DL (ref 0.5–1.3)
CREAT SERPL-MCNC: 2.71 MG/DL (ref 0.5–1.3)
EGFRCR SERPLBLD CKD-EPI 2021: 26 ML/MIN/1.73M*2
EGFRCR SERPLBLD CKD-EPI 2021: 27 ML/MIN/1.73M*2
EGFRCR SERPLBLD CKD-EPI 2021: 31 ML/MIN/1.73M*2
ERYTHROCYTE [DISTWIDTH] IN BLOOD BY AUTOMATED COUNT: 15.4 % (ref 11.5–14.5)
ERYTHROCYTE [DISTWIDTH] IN BLOOD BY AUTOMATED COUNT: 15.5 % (ref 11.5–14.5)
ERYTHROCYTE [DISTWIDTH] IN BLOOD BY AUTOMATED COUNT: 15.8 % (ref 11.5–14.5)
FIBRINOGEN PPP-MCNC: 285 MG/DL (ref 200–400)
GLUCOSE BLD MANUAL STRIP-MCNC: 153 MG/DL (ref 74–99)
GLUCOSE BLD MANUAL STRIP-MCNC: 153 MG/DL (ref 74–99)
GLUCOSE BLD MANUAL STRIP-MCNC: 154 MG/DL (ref 74–99)
GLUCOSE BLD MANUAL STRIP-MCNC: 161 MG/DL (ref 74–99)
GLUCOSE BLDA-MCNC: 155 MG/DL (ref 74–99)
GLUCOSE BLDA-MCNC: 168 MG/DL (ref 74–99)
GLUCOSE BLDA-MCNC: 170 MG/DL (ref 74–99)
GLUCOSE BLDA-MCNC: 171 MG/DL (ref 74–99)
GLUCOSE BLDA-MCNC: 178 MG/DL (ref 74–99)
GLUCOSE BLDA-MCNC: 182 MG/DL (ref 74–99)
GLUCOSE BLDA-MCNC: 182 MG/DL (ref 74–99)
GLUCOSE BLDA-MCNC: 186 MG/DL (ref 74–99)
GLUCOSE SERPL-MCNC: 152 MG/DL (ref 74–99)
GLUCOSE SERPL-MCNC: 179 MG/DL (ref 74–99)
GLUCOSE SERPL-MCNC: 187 MG/DL (ref 74–99)
HBV CORE IGM SER QL: NONREACTIVE
HBV SURFACE AB SER-ACNC: 141.6 MIU/ML
HBV SURFACE AG SERPL QL IA: NONREACTIVE
HCO3 BLDA-SCNC: 20 MMOL/L (ref 22–26)
HCO3 BLDA-SCNC: 20.1 MMOL/L (ref 22–26)
HCO3 BLDA-SCNC: 20.2 MMOL/L (ref 22–26)
HCO3 BLDA-SCNC: 20.8 MMOL/L (ref 22–26)
HCO3 BLDA-SCNC: 21.4 MMOL/L (ref 22–26)
HCT VFR BLD AUTO: 22 % (ref 41–52)
HCT VFR BLD AUTO: 24.5 % (ref 41–52)
HCT VFR BLD AUTO: 25.2 % (ref 41–52)
HCT VFR BLD EST: 24 % (ref 41–52)
HCT VFR BLD EST: 25 % (ref 41–52)
HCT VFR BLD EST: 26 % (ref 41–52)
HCT VFR BLD EST: 26 % (ref 41–52)
HCT VFR BLD EST: 27 % (ref 41–52)
HCT VFR BLD EST: 31 % (ref 41–52)
HCV AB SER QL: NONREACTIVE
HGB BLD-MCNC: 8.3 G/DL (ref 13.5–17.5)
HGB BLD-MCNC: 9 G/DL (ref 13.5–17.5)
HGB BLD-MCNC: 9.2 G/DL (ref 13.5–17.5)
HGB BLDA-MCNC: 10.4 G/DL (ref 13.5–17.5)
HGB BLDA-MCNC: 8.1 G/DL (ref 13.5–17.5)
HGB BLDA-MCNC: 8.2 G/DL (ref 13.5–17.5)
HGB BLDA-MCNC: 8.5 G/DL (ref 13.5–17.5)
HGB BLDA-MCNC: 8.6 G/DL (ref 13.5–17.5)
HGB BLDA-MCNC: 8.9 G/DL (ref 13.5–17.5)
HGB BLDA-MCNC: 9 G/DL (ref 13.5–17.5)
HGB BLDA-MCNC: 9 G/DL (ref 13.5–17.5)
HIV 1+2 AB+HIV1 P24 AG SERPL QL IA: NONREACTIVE
HOLD SPECIMEN: NORMAL
INHALED O2 CONCENTRATION: 50 %
INHALED O2 CONCENTRATION: 60 %
INHALED O2 CONCENTRATION: 70 %
INHALED O2 CONCENTRATION: 80 %
INHALED O2 CONCENTRATION: 80 %
INHALED O2 CONCENTRATION: 90 %
INR PPP: 1.8 (ref 0.9–1.1)
INR PPP: 2 (ref 0.9–1.1)
LACTATE BLDA-SCNC: 6.1 MMOL/L (ref 0.4–2)
LACTATE BLDA-SCNC: 6.7 MMOL/L (ref 0.4–2)
LACTATE BLDA-SCNC: 7.3 MMOL/L (ref 0.4–2)
LACTATE BLDA-SCNC: 7.6 MMOL/L (ref 0.4–2)
LACTATE BLDA-SCNC: 8.3 MMOL/L (ref 0.4–2)
LACTATE BLDA-SCNC: 8.3 MMOL/L (ref 0.4–2)
LACTATE BLDA-SCNC: 8.8 MMOL/L (ref 0.4–2)
LACTATE BLDA-SCNC: 8.8 MMOL/L (ref 0.4–2)
LDH SERPL L TO P-CCNC: 2263 U/L (ref 84–246)
LIPASE SERPL-CCNC: 422 U/L (ref 9–82)
MAGNESIUM SERPL-MCNC: 2.08 MG/DL (ref 1.6–2.4)
MAGNESIUM SERPL-MCNC: 2.09 MG/DL (ref 1.6–2.4)
MAGNESIUM SERPL-MCNC: 2.13 MG/DL (ref 1.6–2.4)
MCH RBC QN AUTO: 31.4 PG (ref 26–34)
MCH RBC QN AUTO: 31.6 PG (ref 26–34)
MCH RBC QN AUTO: 31.6 PG (ref 26–34)
MCHC RBC AUTO-ENTMCNC: 36.5 G/DL (ref 32–36)
MCHC RBC AUTO-ENTMCNC: 36.7 G/DL (ref 32–36)
MCHC RBC AUTO-ENTMCNC: 37.7 G/DL (ref 32–36)
MCV RBC AUTO: 84 FL (ref 80–100)
MCV RBC AUTO: 85 FL (ref 80–100)
MCV RBC AUTO: 87 FL (ref 80–100)
NRBC BLD-RTO: 2.7 /100 WBCS (ref 0–0)
NRBC BLD-RTO: 4.1 /100 WBCS (ref 0–0)
NRBC BLD-RTO: 5.7 /100 WBCS (ref 0–0)
OXYHGB MFR BLDA: 93.7 % (ref 94–98)
OXYHGB MFR BLDA: 95.1 % (ref 94–98)
OXYHGB MFR BLDA: 95.6 % (ref 94–98)
OXYHGB MFR BLDA: 96.1 % (ref 94–98)
OXYHGB MFR BLDA: 96.2 % (ref 94–98)
OXYHGB MFR BLDA: 96.3 % (ref 94–98)
OXYHGB MFR BLDA: 96.3 % (ref 94–98)
OXYHGB MFR BLDA: 96.4 % (ref 94–98)
PCO2 BLDA: 33 MM HG (ref 38–42)
PCO2 BLDA: 35 MM HG (ref 38–42)
PCO2 BLDA: 36 MM HG (ref 38–42)
PCO2 BLDA: 37 MM HG (ref 38–42)
PCO2 BLDA: 37 MM HG (ref 38–42)
PCO2 BLDA: 38 MM HG (ref 38–42)
PCO2 BLDA: 38 MM HG (ref 38–42)
PCO2 BLDA: 40 MM HG (ref 38–42)
PH BLDA: 7.31 PH (ref 7.38–7.42)
PH BLDA: 7.33 PH (ref 7.38–7.42)
PH BLDA: 7.33 PH (ref 7.38–7.42)
PH BLDA: 7.34 PH (ref 7.38–7.42)
PH BLDA: 7.37 PH (ref 7.38–7.42)
PH BLDA: 7.39 PH (ref 7.38–7.42)
PHOSPHATE SERPL-MCNC: 3.6 MG/DL (ref 2.5–4.9)
PHOSPHATE SERPL-MCNC: 3.9 MG/DL (ref 2.5–4.9)
PHOSPHATE SERPL-MCNC: 4.7 MG/DL (ref 2.5–4.9)
PLATELET # BLD AUTO: 87 X10*3/UL (ref 150–450)
PLATELET # BLD AUTO: 87 X10*3/UL (ref 150–450)
PLATELET # BLD AUTO: 90 X10*3/UL (ref 150–450)
PO2 BLDA: 101 MM HG (ref 85–95)
PO2 BLDA: 76 MM HG (ref 85–95)
PO2 BLDA: 79 MM HG (ref 85–95)
PO2 BLDA: 82 MM HG (ref 85–95)
PO2 BLDA: 85 MM HG (ref 85–95)
PO2 BLDA: 86 MM HG (ref 85–95)
PO2 BLDA: 89 MM HG (ref 85–95)
PO2 BLDA: 92 MM HG (ref 85–95)
POTASSIUM BLDA-SCNC: 4 MMOL/L (ref 3.5–5.3)
POTASSIUM BLDA-SCNC: 4 MMOL/L (ref 3.5–5.3)
POTASSIUM BLDA-SCNC: 4.1 MMOL/L (ref 3.5–5.3)
POTASSIUM BLDA-SCNC: 4.2 MMOL/L (ref 3.5–5.3)
POTASSIUM BLDA-SCNC: 4.2 MMOL/L (ref 3.5–5.3)
POTASSIUM BLDA-SCNC: 4.6 MMOL/L (ref 3.5–5.3)
POTASSIUM SERPL-SCNC: 4.1 MMOL/L (ref 3.5–5.3)
PROT SERPL-MCNC: 4.1 G/DL (ref 6.4–8.2)
PROTHROMBIN TIME: 20.2 SECONDS (ref 9.8–12.8)
PROTHROMBIN TIME: 22.3 SECONDS (ref 9.8–12.8)
RBC # BLD AUTO: 2.63 X10*6/UL (ref 4.5–5.9)
RBC # BLD AUTO: 2.87 X10*6/UL (ref 4.5–5.9)
RBC # BLD AUTO: 2.91 X10*6/UL (ref 4.5–5.9)
SAO2 % BLDA: 97 % (ref 94–100)
SAO2 % BLDA: 98 % (ref 94–100)
SODIUM BLDA-SCNC: 132 MMOL/L (ref 136–145)
SODIUM BLDA-SCNC: 132 MMOL/L (ref 136–145)
SODIUM BLDA-SCNC: 133 MMOL/L (ref 136–145)
SODIUM BLDA-SCNC: 135 MMOL/L (ref 136–145)
SODIUM BLDA-SCNC: 136 MMOL/L (ref 136–145)
SODIUM BLDA-SCNC: 136 MMOL/L (ref 136–145)
SODIUM SERPL-SCNC: 136 MMOL/L (ref 136–145)
SODIUM SERPL-SCNC: 137 MMOL/L (ref 136–145)
SODIUM SERPL-SCNC: 137 MMOL/L (ref 136–145)
UFH PPP CHRO-ACNC: 0.1 IU/ML
VANCOMYCIN SERPL-MCNC: 21.8 UG/ML (ref 5–20)
VANCOMYCIN TROUGH SERPL-MCNC: 13.9 UG/ML (ref 5–20)
WBC # BLD AUTO: 11.8 X10*3/UL (ref 4.4–11.3)
WBC # BLD AUTO: 8.8 X10*3/UL (ref 4.4–11.3)
WBC # BLD AUTO: 9.6 X10*3/UL (ref 4.4–11.3)

## 2024-03-23 PROCEDURE — 2500000004 HC RX 250 GENERAL PHARMACY W/ HCPCS (ALT 636 FOR OP/ED): Performed by: NURSE PRACTITIONER

## 2024-03-23 PROCEDURE — 99291 CRITICAL CARE FIRST HOUR: CPT | Performed by: NURSE PRACTITIONER

## 2024-03-23 PROCEDURE — 84075 ASSAY ALKALINE PHOSPHATASE: CPT | Performed by: STUDENT IN AN ORGANIZED HEALTH CARE EDUCATION/TRAINING PROGRAM

## 2024-03-23 PROCEDURE — 84100 ASSAY OF PHOSPHORUS: CPT | Performed by: NURSE PRACTITIONER

## 2024-03-23 PROCEDURE — 2500000001 HC RX 250 WO HCPCS SELF ADMINISTERED DRUGS (ALT 637 FOR MEDICARE OP): Performed by: NURSE PRACTITIONER

## 2024-03-23 PROCEDURE — 31622 DX BRONCHOSCOPE/WASH: CPT

## 2024-03-23 PROCEDURE — 0BH18EZ INSERTION OF ENDOTRACHEAL AIRWAY INTO TRACHEA, VIA NATURAL OR ARTIFICIAL OPENING ENDOSCOPIC: ICD-10-PCS | Performed by: STUDENT IN AN ORGANIZED HEALTH CARE EDUCATION/TRAINING PROGRAM

## 2024-03-23 PROCEDURE — 85384 FIBRINOGEN ACTIVITY: CPT | Performed by: NURSE PRACTITIONER

## 2024-03-23 PROCEDURE — 83520 IMMUNOASSAY QUANT NOS NONAB: CPT | Performed by: INTERNAL MEDICINE

## 2024-03-23 PROCEDURE — 31500 INSERT EMERGENCY AIRWAY: CPT

## 2024-03-23 PROCEDURE — 99291 CRITICAL CARE FIRST HOUR: CPT | Performed by: STUDENT IN AN ORGANIZED HEALTH CARE EDUCATION/TRAINING PROGRAM

## 2024-03-23 PROCEDURE — 37799 UNLISTED PX VASCULAR SURGERY: CPT

## 2024-03-23 PROCEDURE — 71045 X-RAY EXAM CHEST 1 VIEW: CPT

## 2024-03-23 PROCEDURE — 94645 CONT INHLJ TX EACH ADDL HOUR: CPT

## 2024-03-23 PROCEDURE — 80202 ASSAY OF VANCOMYCIN: CPT | Performed by: STUDENT IN AN ORGANIZED HEALTH CARE EDUCATION/TRAINING PROGRAM

## 2024-03-23 PROCEDURE — 2500000005 HC RX 250 GENERAL PHARMACY W/O HCPCS: Performed by: NURSE PRACTITIONER

## 2024-03-23 PROCEDURE — 82150 ASSAY OF AMYLASE: CPT | Performed by: NURSE PRACTITIONER

## 2024-03-23 PROCEDURE — 84132 ASSAY OF SERUM POTASSIUM: CPT | Performed by: NURSE PRACTITIONER

## 2024-03-23 PROCEDURE — 82947 ASSAY GLUCOSE BLOOD QUANT: CPT

## 2024-03-23 PROCEDURE — 2500000005 HC RX 250 GENERAL PHARMACY W/O HCPCS: Performed by: STUDENT IN AN ORGANIZED HEALTH CARE EDUCATION/TRAINING PROGRAM

## 2024-03-23 PROCEDURE — 83615 LACTATE (LD) (LDH) ENZYME: CPT | Performed by: INTERNAL MEDICINE

## 2024-03-23 PROCEDURE — 37799 UNLISTED PX VASCULAR SURGERY: CPT | Performed by: NURSE PRACTITIONER

## 2024-03-23 PROCEDURE — 87389 HIV-1 AG W/HIV-1&-2 AB AG IA: CPT | Performed by: INTERNAL MEDICINE

## 2024-03-23 PROCEDURE — 2500000005 HC RX 250 GENERAL PHARMACY W/O HCPCS

## 2024-03-23 PROCEDURE — 90945 DIALYSIS ONE EVALUATION: CPT | Performed by: INTERNAL MEDICINE

## 2024-03-23 PROCEDURE — 99232 SBSQ HOSP IP/OBS MODERATE 35: CPT

## 2024-03-23 PROCEDURE — 2500000004 HC RX 250 GENERAL PHARMACY W/ HCPCS (ALT 636 FOR OP/ED): Performed by: STUDENT IN AN ORGANIZED HEALTH CARE EDUCATION/TRAINING PROGRAM

## 2024-03-23 PROCEDURE — 31500 INSERT EMERGENCY AIRWAY: CPT | Mod: GC | Performed by: STUDENT IN AN ORGANIZED HEALTH CARE EDUCATION/TRAINING PROGRAM

## 2024-03-23 PROCEDURE — 85610 PROTHROMBIN TIME: CPT | Performed by: NURSE PRACTITIONER

## 2024-03-23 PROCEDURE — 31500 INSERT EMERGENCY AIRWAY: CPT | Performed by: STUDENT IN AN ORGANIZED HEALTH CARE EDUCATION/TRAINING PROGRAM

## 2024-03-23 PROCEDURE — 85520 HEPARIN ASSAY: CPT | Performed by: NURSE PRACTITIONER

## 2024-03-23 PROCEDURE — P9040 RBC LEUKOREDUCED IRRADIATED: HCPCS

## 2024-03-23 PROCEDURE — 99292 CRITICAL CARE ADDL 30 MIN: CPT | Performed by: NURSE PRACTITIONER

## 2024-03-23 PROCEDURE — C9113 INJ PANTOPRAZOLE SODIUM, VIA: HCPCS | Performed by: NURSE PRACTITIONER

## 2024-03-23 PROCEDURE — 2020000001 HC ICU ROOM DAILY

## 2024-03-23 PROCEDURE — 82330 ASSAY OF CALCIUM: CPT

## 2024-03-23 PROCEDURE — 83735 ASSAY OF MAGNESIUM: CPT | Performed by: NURSE PRACTITIONER

## 2024-03-23 PROCEDURE — 71045 X-RAY EXAM CHEST 1 VIEW: CPT | Performed by: STUDENT IN AN ORGANIZED HEALTH CARE EDUCATION/TRAINING PROGRAM

## 2024-03-23 PROCEDURE — 36430 TRANSFUSION BLD/BLD COMPNT: CPT

## 2024-03-23 PROCEDURE — 85027 COMPLETE CBC AUTOMATED: CPT | Performed by: NURSE PRACTITIONER

## 2024-03-23 PROCEDURE — 83690 ASSAY OF LIPASE: CPT | Performed by: NURSE PRACTITIONER

## 2024-03-23 PROCEDURE — 94003 VENT MGMT INPAT SUBQ DAY: CPT

## 2024-03-23 PROCEDURE — P9047 ALBUMIN (HUMAN), 25%, 50ML: HCPCS | Mod: JZ | Performed by: NURSE PRACTITIONER

## 2024-03-23 PROCEDURE — 82550 ASSAY OF CK (CPK): CPT | Performed by: NURSE PRACTITIONER

## 2024-03-23 PROCEDURE — 2500000004 HC RX 250 GENERAL PHARMACY W/ HCPCS (ALT 636 FOR OP/ED)

## 2024-03-23 RX ORDER — HEPARIN SODIUM 10000 [USP'U]/100ML
0-4000 INJECTION, SOLUTION INTRAVENOUS CONTINUOUS
Status: DISCONTINUED | OUTPATIENT
Start: 2024-03-23 | End: 2024-03-24

## 2024-03-23 RX ORDER — VANCOMYCIN HYDROCHLORIDE 1 G/200ML
1000 INJECTION, SOLUTION INTRAVENOUS EVERY 12 HOURS
Status: DISCONTINUED | OUTPATIENT
Start: 2024-03-24 | End: 2024-03-27

## 2024-03-23 RX ORDER — PROPOFOL 10 MG/ML
5-50 INJECTION, EMULSION INTRAVENOUS CONTINUOUS
Status: DISCONTINUED | OUTPATIENT
Start: 2024-03-23 | End: 2024-03-24

## 2024-03-23 RX ORDER — FENTANYL CITRATE-0.9 % NACL/PF 10 MCG/ML
25-200 PLASTIC BAG, INJECTION (ML) INTRAVENOUS CONTINUOUS
Status: DISCONTINUED | OUTPATIENT
Start: 2024-03-23 | End: 2024-03-26

## 2024-03-23 RX ORDER — MUPIROCIN 20 MG/G
OINTMENT TOPICAL 2 TIMES DAILY
Status: COMPLETED | OUTPATIENT
Start: 2024-03-23 | End: 2024-03-27

## 2024-03-23 RX ORDER — ALBUMIN HUMAN 250 G/1000ML
25 SOLUTION INTRAVENOUS EVERY 6 HOURS
Status: COMPLETED | OUTPATIENT
Start: 2024-03-23 | End: 2024-03-24

## 2024-03-23 RX ORDER — HEPARIN SODIUM 5000 [USP'U]/ML
3000-4000 INJECTION, SOLUTION INTRAVENOUS; SUBCUTANEOUS EVERY 4 HOURS PRN
Status: DISCONTINUED | OUTPATIENT
Start: 2024-03-23 | End: 2024-03-24

## 2024-03-23 RX ORDER — ROCURONIUM BROMIDE 10 MG/ML
INJECTION, SOLUTION INTRAVENOUS
Status: COMPLETED
Start: 2024-03-23 | End: 2024-03-23

## 2024-03-23 RX ORDER — OXYMETAZOLINE HCL 0.05 %
2 SPRAY, NON-AEROSOL (ML) NASAL EVERY 12 HOURS PRN
Status: DISCONTINUED | OUTPATIENT
Start: 2024-03-23 | End: 2024-03-25

## 2024-03-23 RX ORDER — ROCURONIUM BROMIDE 10 MG/ML
50 INJECTION, SOLUTION INTRAVENOUS ONCE
Status: COMPLETED | OUTPATIENT
Start: 2024-03-23 | End: 2024-03-23

## 2024-03-23 RX ADMIN — PROPOFOL 50 MCG/KG/MIN: 10 INJECTION, EMULSION INTRAVENOUS at 17:09

## 2024-03-23 RX ADMIN — Medication 1 APPLICATION: at 10:00

## 2024-03-23 RX ADMIN — ROCURONIUM BROMIDE 50 MG: 10 INJECTION, SOLUTION INTRAVENOUS at 16:50

## 2024-03-23 RX ADMIN — HEPARIN SODIUM 1000 UNITS/HR: 10000 INJECTION, SOLUTION INTRAVENOUS at 15:41

## 2024-03-23 RX ADMIN — PANTOPRAZOLE SODIUM 40 MG: 40 INJECTION, POWDER, FOR SOLUTION INTRAVENOUS at 09:30

## 2024-03-23 RX ADMIN — ANGIOTENSIN II 40 NG/KG/MIN: 2.5 INJECTION INTRAVENOUS at 05:45

## 2024-03-23 RX ADMIN — Medication 1 APPLICATION: at 16:00

## 2024-03-23 RX ADMIN — CISATRACURIUM BESYLATE 2.81 MCG/KG/MIN: 200 INJECTION, SOLUTION INTRAVENOUS at 12:25

## 2024-03-23 RX ADMIN — VANCOMYCIN HYDROCHLORIDE 750 MG: 750 INJECTION, SOLUTION INTRAVENOUS at 03:16

## 2024-03-23 RX ADMIN — CALCIUM CHLORIDE, MAGNESIUM CHLORIDE, DEXTROSE MONOHYDRATE, LACTIC ACID, SODIUM CHLORIDE, SODIUM BICARBONATE AND POTASSIUM CHLORIDE 2400 ML/HR: 5.15; 2.03; 22; 5.4; 6.46; 3.09; .157 INJECTION INTRAVENOUS at 12:21

## 2024-03-23 RX ADMIN — MUPIROCIN: 20 OINTMENT TOPICAL at 09:30

## 2024-03-23 RX ADMIN — PROPOFOL 50 MCG/KG/MIN: 10 INJECTION, EMULSION INTRAVENOUS at 13:45

## 2024-03-23 RX ADMIN — Medication 0.05 MCG/KG/MIN: at 08:42

## 2024-03-23 RX ADMIN — CALCIUM CHLORIDE, MAGNESIUM CHLORIDE, DEXTROSE MONOHYDRATE, LACTIC ACID, SODIUM CHLORIDE, SODIUM BICARBONATE AND POTASSIUM CHLORIDE 2400 ML/HR: 5.15; 2.03; 22; 5.4; 6.46; 3.09; .157 INJECTION INTRAVENOUS at 18:47

## 2024-03-23 RX ADMIN — Medication 125 MCG/HR: at 12:21

## 2024-03-23 RX ADMIN — Medication 0.05 MCG/KG/MIN: at 04:00

## 2024-03-23 RX ADMIN — AMIODARONE HYDROCHLORIDE 1 MG/MIN: 1.8 INJECTION, SOLUTION INTRAVENOUS at 23:08

## 2024-03-23 RX ADMIN — HYDROCORTISONE SODIUM SUCCINATE 50 MG: 100 INJECTION, POWDER, FOR SOLUTION INTRAMUSCULAR; INTRAVENOUS at 15:13

## 2024-03-23 RX ADMIN — PIPERACILLIN SODIUM AND TAZOBACTAM SODIUM 3.38 G: 3; .375 INJECTION, SOLUTION INTRAVENOUS at 23:00

## 2024-03-23 RX ADMIN — SODIUM CHLORIDE 0.47 MCG/KG/MIN: 9 INJECTION, SOLUTION INTRAVENOUS at 12:23

## 2024-03-23 RX ADMIN — HYDROCORTISONE SODIUM SUCCINATE 50 MG: 100 INJECTION, POWDER, FOR SOLUTION INTRAMUSCULAR; INTRAVENOUS at 01:40

## 2024-03-23 RX ADMIN — ALBUMIN HUMAN 25 G: 0.25 SOLUTION INTRAVENOUS at 18:03

## 2024-03-23 RX ADMIN — ROCURONIUM BROMIDE 50 MG: 10 INJECTION INTRAVENOUS at 16:50

## 2024-03-23 RX ADMIN — Medication 100 MCG/HR: at 02:34

## 2024-03-23 RX ADMIN — VASOPRESSIN 0.06 UNITS/MIN: 0.2 INJECTION INTRAVENOUS at 16:27

## 2024-03-23 RX ADMIN — Medication 0.05 MCG/KG/MIN: at 15:36

## 2024-03-23 RX ADMIN — PROPOFOL 50 MCG/KG/MIN: 10 INJECTION, EMULSION INTRAVENOUS at 09:37

## 2024-03-23 RX ADMIN — MUPIROCIN: 20 OINTMENT TOPICAL at 20:13

## 2024-03-23 RX ADMIN — HYDROCORTISONE SODIUM SUCCINATE 50 MG: 100 INJECTION, POWDER, FOR SOLUTION INTRAMUSCULAR; INTRAVENOUS at 09:00

## 2024-03-23 RX ADMIN — SODIUM CHLORIDE 0.4 MCG/KG/MIN: 9 INJECTION, SOLUTION INTRAVENOUS at 19:31

## 2024-03-23 RX ADMIN — EPINEPHRINE 0.12 MCG/KG/MIN: 1 INJECTION INTRAMUSCULAR; INTRAVENOUS; SUBCUTANEOUS at 06:18

## 2024-03-23 RX ADMIN — PROPOFOL 30 MCG/KG/MIN: 10 INJECTION, EMULSION INTRAVENOUS at 20:06

## 2024-03-23 RX ADMIN — CISATRACURIUM BESYLATE 2.25 MCG/KG/MIN: 200 INJECTION, SOLUTION INTRAVENOUS at 06:18

## 2024-03-23 RX ADMIN — HYDROCORTISONE SODIUM SUCCINATE 50 MG: 100 INJECTION, POWDER, FOR SOLUTION INTRAMUSCULAR; INTRAVENOUS at 20:13

## 2024-03-23 RX ADMIN — CALCIUM CHLORIDE, MAGNESIUM CHLORIDE, DEXTROSE MONOHYDRATE, LACTIC ACID, SODIUM CHLORIDE, SODIUM BICARBONATE AND POTASSIUM CHLORIDE 2400 ML/HR: 5.15; 2.03; 22; 5.4; 6.46; 3.09; .157 INJECTION INTRAVENOUS at 12:23

## 2024-03-23 RX ADMIN — SODIUM CHLORIDE 0.5 MCG/KG/MIN: 9 INJECTION, SOLUTION INTRAVENOUS at 06:18

## 2024-03-23 RX ADMIN — CALCIUM CHLORIDE, MAGNESIUM CHLORIDE, DEXTROSE MONOHYDRATE, LACTIC ACID, SODIUM CHLORIDE, SODIUM BICARBONATE AND POTASSIUM CHLORIDE 2400 ML/HR: 5.15; 2.03; 22; 5.4; 6.46; 3.09; .157 INJECTION INTRAVENOUS at 18:44

## 2024-03-23 RX ADMIN — VANCOMYCIN HYDROCHLORIDE 750 MG: 750 INJECTION, SOLUTION INTRAVENOUS at 16:21

## 2024-03-23 RX ADMIN — VASOPRESSIN 0.06 UNITS/MIN: 0.2 INJECTION INTRAVENOUS at 23:08

## 2024-03-23 RX ADMIN — PIPERACILLIN SODIUM AND TAZOBACTAM SODIUM 3.38 G: 3; .375 INJECTION, SOLUTION INTRAVENOUS at 04:27

## 2024-03-23 RX ADMIN — Medication: at 20:00

## 2024-03-23 RX ADMIN — PIPERACILLIN SODIUM AND TAZOBACTAM SODIUM 3.38 G: 3; .375 INJECTION, SOLUTION INTRAVENOUS at 17:22

## 2024-03-23 RX ADMIN — PROPOFOL 40 MCG/KG/MIN: 10 INJECTION, EMULSION INTRAVENOUS at 01:40

## 2024-03-23 RX ADMIN — AMIODARONE HYDROCHLORIDE 1 MG/MIN: 1.8 INJECTION, SOLUTION INTRAVENOUS at 01:40

## 2024-03-23 RX ADMIN — PIPERACILLIN SODIUM AND TAZOBACTAM SODIUM 3.38 G: 3; .375 INJECTION, SOLUTION INTRAVENOUS at 12:21

## 2024-03-23 RX ADMIN — AMIODARONE HYDROCHLORIDE 1 MG/MIN: 1.8 INJECTION, SOLUTION INTRAVENOUS at 16:23

## 2024-03-23 RX ADMIN — CALCIUM CHLORIDE, MAGNESIUM CHLORIDE, DEXTROSE MONOHYDRATE, LACTIC ACID, SODIUM CHLORIDE, SODIUM BICARBONATE AND POTASSIUM CHLORIDE 2400 ML/HR: 5.15; 2.03; 22; 5.4; 6.46; 3.09; .157 INJECTION INTRAVENOUS at 18:46

## 2024-03-23 RX ADMIN — Medication 1 APPLICATION: at 22:00

## 2024-03-23 RX ADMIN — Medication 0.9 MG/KG/HR: at 19:30

## 2024-03-23 RX ADMIN — VASOPRESSIN 0.06 UNITS/MIN: 0.2 INJECTION INTRAVENOUS at 05:45

## 2024-03-23 RX ADMIN — SODIUM CHLORIDE 0.5 MCG/KG/MIN: 9 INJECTION, SOLUTION INTRAVENOUS at 02:30

## 2024-03-23 RX ADMIN — Medication 0.39 MG/KG/HR: at 11:00

## 2024-03-23 RX ADMIN — Medication 150 MCG/HR: at 20:50

## 2024-03-23 RX ADMIN — AMIODARONE HYDROCHLORIDE 1 MG/MIN: 1.8 INJECTION, SOLUTION INTRAVENOUS at 10:04

## 2024-03-23 RX ADMIN — CALCIUM CHLORIDE, MAGNESIUM CHLORIDE, DEXTROSE MONOHYDRATE, LACTIC ACID, SODIUM CHLORIDE, SODIUM BICARBONATE AND POTASSIUM CHLORIDE 2400 ML/HR: 5.15; 2.03; 22; 5.4; 6.46; 3.09; .157 INJECTION INTRAVENOUS at 12:25

## 2024-03-23 RX ADMIN — PROPOFOL 50 MCG/KG/MIN: 10 INJECTION, EMULSION INTRAVENOUS at 05:45

## 2024-03-23 RX ADMIN — Medication 0.05 MCG/KG/MIN: at 10:53

## 2024-03-23 RX ADMIN — INSULIN HUMAN 3 UNITS/HR: 1 INJECTION, SOLUTION INTRAVENOUS at 20:13

## 2024-03-23 RX ADMIN — VASOPRESSIN 0.06 UNITS/MIN: 0.2 INJECTION INTRAVENOUS at 10:06

## 2024-03-23 RX ADMIN — MICAFUNGIN SODIUM 100 MG: 100 INJECTION, POWDER, LYOPHILIZED, FOR SOLUTION INTRAVENOUS at 18:44

## 2024-03-23 RX ADMIN — CALCIUM GLUCONATE 1 G: 20 INJECTION, SOLUTION INTRAVENOUS at 06:14

## 2024-03-23 RX ADMIN — ALBUMIN HUMAN 25 G: 0.25 SOLUTION INTRAVENOUS at 21:30

## 2024-03-23 RX ADMIN — ANGIOTENSIN II 39.93 NG/KG/MIN: 2.5 INJECTION INTRAVENOUS at 16:22

## 2024-03-23 ASSESSMENT — PAIN - FUNCTIONAL ASSESSMENT
PAIN_FUNCTIONAL_ASSESSMENT: CPOT (CRITICAL CARE PAIN OBSERVATION TOOL)

## 2024-03-23 NOTE — PROGRESS NOTES
"Jason Hollins is a 63 y.o. male on day 18 of admission presenting with Soft tissue sarcoma (CMS/HCC).    Subjective   A fib RVR overnight requiring amiodarone bolus and infusion. Weaned off bicarbonate infusion.        Objective     Physical Exam    Last Recorded Vitals  Blood pressure 122/60, pulse (!) 121, temperature 36.2 °C (97.2 °F), temperature source Bladder, resp. rate (!) 28, height 1.778 m (5' 10\"), weight 129 kg (284 lb 2.8 oz), SpO2 94 %.  Intake/Output last 3 Shifts:  I/O last 3 completed shifts:  In: 9784.9 (75.9 mL/kg) [I.V.:6978.4 (54.1 mL/kg); Blood:457.6; IV Piggyback:2348.9]  Out: 992 (7.7 mL/kg) [Urine:15 (0 mL/kg/hr); Drains:945; Other:32]  Weight: 128.9 kg     Relevant Results           This patient currently has cardiac telemetry ordered; if you would like to modify or discontinue the telemetry order, click here to go to the orders activity to modify/discontinue the order.  This patient has a central line   Reason for the central line remaining today? Dialysis/Hemapheresis, Hemodynamic monitoring, and Parenteral medication      This patient is intubated   Reason for patient to remain intubated today? they have continued cardiopulmonary lability/instability and they have inadequate gas-exchange without positive pressure             Assessment/Plan   Principal Problem:    Soft tissue sarcoma (CMS/HCC)  Active Problems:    Extraskeletal myxoid chondrosarcoma (CMS/HCC)    Cardiopulmonary arrest (CMS/HCC)    63 y.o. 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 Hawkins and Dr. Smith on 3/5/24.  Post operative course was uncomplicated and patient was on RNF until 3/20 for prolonged bed rest to avoid hip flexion to maintain flap integrity.  Patient was on prophylactic lovenox while on bedrest. Per nursing " report, patient was getting to the side of the bed today to get up and walk when he had sudden chest heaviness. Pre-cardiac arrest EKG with signs of anterior ischemia. Nursing was called by family because patient went unresponsive.  ACLS was immediately started. ROSC was achieved after 1 round of CPR, however patient quickly decompensated into PEA.  Airway was secured.  Multiple rounds of CPR were completed with intermittent ROSC prior to transport to CTICU.  Patient arrived to CTICU under SICU care with active CPR underway.  Primary concern for STEMI vs massive PE.  Bedside POCUS TTE with biventricular failure and dilated RV.  RV septal bowing. Decided to give TPA to treat massive PE.  After several minutes of CPR patient achieved  sustained ROSC. Subsequently taken to cath lab where angiography illustrated clean coronaries and no significant PE.      3/20: Now s/p CPR with ROSC after TPA, overnight started on heparin, epo, increased pressor requirements, increased swelling at flap site.   3/21: MTP transfusions. Return to OR for exploration of R Flap site. High pressors. On epo.    3/22 Initiated on CVVH for profound metabolic acidosis, repeat ECHO with hyperdynamic LV, normal RV function, NMB for hypoxemic respiratory failure, CT scans with pancreatitis.      I evaluated the patient with an advanced practice provider. I oversaw this patient's care, and I participated actively their clinical course.     Plan:  Neuro: sedated on propofol/fentanyl; given worsening hemodynamics and respiratory status, maintain deep sedation and cisatracurium infusion. Will transition to ketamine from propofol given persistent metabolic acidosis and hypertriglyceridemia.   Cardiovascular: Undifferentiated shock, remains on significant hemodynamic support epi 0.1, NE 0.4-0.5, angiotensin II 40, vaso 0.06; maintain invasive access; continue to support MAP >65mmHg. Repeat ECHO today with hyperdynamic LV and relatively normal RV function.  No pericardial effusion, no noted PE on angiogram, no PTX, unlikely obstructive. Central venous saturation of 80%. Unlikely cardiogenic. Added stress dose steroids for possible adrenal insufficiency. Hemorrhage currently controlled. Low concern for HLH from hematology consult.   Respiratory: Acute hypoxemic respiratory failure, likely multifactorial in the setting of hypervolemia. Remains on NMB with FiO2 down to 60%, PEEP 14. When FiO2 50% will attempt to stop NMB.    GI: NPO; PPI. Pressor requirements too high to consider enteral nutrition. Persistent lactic acidosis. Elevated INR, elevated AST/ALT, hyperglycemia- some values consistent with liver injury. RUQ ultrasound relatively benign. CT head, chest, abdomen, and pelvis illustrated pancreatitis.   : Oliguric/anuric overnight- remove daniels; started on RRT for persistent metabolic acidosis. Improving.   Endo: Hx DM2. Continue insulin infusion per protocol.   Heme: acute blood loss anemia from surgical limb currently controlled; concern for recent PE (DVT scans ordered and pending), now in atrial fibrillation. Will start low intensity heparin infusion without bolus dosing and monitor for bleeding.   ID: continue vancomycin/zosyn/john for broad coverage. Mupirocin x 5 days for MRSA swab positive.   Dispo: SICU Care; family members and primary surgeon updated. Will consult palliative care Monday.      Due to the high probability of life threatening clinical decompensation, the patient required critical care time evaluating and managing this patient.  Critical care time included obtaining a history, examining the patient, ordering and reviewing studies, discussing, developing, and implementing a management plan, evaluating the patient's response to treatment, and discussion with other care team providers. I saw and evaluated the patient myself. I reviewed the provider's documentation and discussed the patient with the provider. Critical care time was performed  exclusive of billable procedures.     Critical Care Time: 60 minutes         I spent 60 minutes in the professional and overall care of this patient.      Yamilex Rouse MD

## 2024-03-23 NOTE — NURSING NOTE
1715 ET tube exchange at bedside with bronch per ICU team, pt on 100% O2, #8.0 OET tube at 24 cm, after procedure pt back to 60%

## 2024-03-23 NOTE — PROGRESS NOTES
"    Department of Plastic and Reconstructive Surgery  Daily Progress Note    Patient Name: Jason Hollins  MRN: [id number]  Date:  03/23/24     Subjective  Found resting in bed in [facility], intubated, parylized, with CVVHD running    Overnight Events  Went into Afib w/RVR, amio bolus  given, amio gtt initiated    Objective    Vital Signs  /60   Pulse (!) 133   Temp 36.6 °C (97.9 °F) (Bladder)   Resp (!) 28   Ht 1.778 m (5' 10\")   Wt 129 kg (284 lb 2.8 oz)   SpO2 94%   BMI 40.77 kg/m²      Physical Exam   Constitutional: Intubated and paralyzed, family at bedside  ENMT: moist mucous membranes, ETT in place, NGT in place  Cardiovascular: Tachycardia/irregular rhythm on telemetry monitor  Respiratory/Thorax: ETT in place with ventilator support 70% FiO2  Gastrointestinal: abdomen soft, non distended  Genitourinary: indwelling daniels with scant hematuria, oliguric. CVVHD currently running through L fem trialysis cath  Musculoskeletal: Sedated/paralyzed  Extremities: Generalized edema, Right 4Fr CFA and 7Fr CFV sheath in place  RLE: right thigh edematous, large, no bruising noted, soft and compressible, incisional wound vac in place, holding suction at -75 mmHG, no leak or alarm present, ANDERSON drain x 3 with dark serous output  BLE neurovascular intact, cap refill < 2 sec, +df/pf, DP/PT/ radial pulses 2+, no drainage noted.   Neurological:  HANNAH, intubated and sedated  Psychological: HANNAH, intubated and sedated  Skin: Warm, dry, intact    Diagnostics   Results for orders placed or performed during the hospital encounter of 03/05/24 (from the past 24 hour(s))   Transthoracic Echo (TTE) Limited   Result Value Ref Range    Tricuspid annular plane systolic excursion 12.7 cm    BSA 2.52 m2   Blood Gas Arterial Full Panel   Result Value Ref Range    POCT pH, Arterial 7.24 (LL) 7.38 - 7.42 pH    POCT pCO2, Arterial 35 (L) 38 - 42 mm Hg    POCT pO2, Arterial 95 85 - 95 mm Hg    POCT SO2, Arterial 99 94 - 100 %    POCT Oxy " Hemoglobin, Arterial 96.0 94.0 - 98.0 %    POCT Hematocrit Calculated, Arterial 28.0 (L) 41.0 - 52.0 %    POCT Sodium, Arterial 136 136 - 145 mmol/L    POCT Potassium, Arterial 4.8 3.5 - 5.3 mmol/L    POCT Chloride, Arterial 99 98 - 107 mmol/L    POCT Ionized Calcium, Arterial 1.05 (L) 1.10 - 1.33 mmol/L    POCT Glucose, Arterial 210 (H) 74 - 99 mg/dL    POCT Lactate, Arterial 14.7 (HH) 0.4 - 2.0 mmol/L    POCT Base Excess, Arterial -11.4 (L) -2.0 - 3.0 mmol/L    POCT HCO3 Calculated, Arterial 15.0 (L) 22.0 - 26.0 mmol/L    POCT Hemoglobin, Arterial 9.3 (L) 13.5 - 17.5 g/dL    POCT Anion Gap, Arterial 27 (H) 10 - 25 mmo/L    Patient Temperature 37.0 degrees Celsius    FiO2 100 %   Creatine Kinase   Result Value Ref Range    Creatine Kinase 3,619 (H) 0 - 325 U/L   Hepatic Function Panel   Result Value Ref Range    Albumin 3.0 (L) 3.4 - 5.0 g/dL    Bilirubin, Total 4.2 (H) 0.0 - 1.2 mg/dL    Bilirubin, Direct 3.0 (H) 0.0 - 0.3 mg/dL    Alkaline Phosphatase 69 33 - 136 U/L    ALT 1,618 (H) 10 - 52 U/L    AST 3,786 (H) 9 - 39 U/L    Total Protein 4.4 (L) 6.4 - 8.2 g/dL   Ferritin   Result Value Ref Range    Ferritin 6,300 (H) 20 - 300 ng/mL   Amylase   Result Value Ref Range    Amylase 649 (H) 29 - 103 U/L   CBC   Result Value Ref Range    WBC 8.5 4.4 - 11.3 x10*3/uL    nRBC 1.8 (H) 0.0 - 0.0 /100 WBCs    RBC 2.69 (L) 4.50 - 5.90 x10*6/uL    Hemoglobin 8.0 (L) 13.5 - 17.5 g/dL    Hematocrit 22.6 (L) 41.0 - 52.0 %    MCV 84 80 - 100 fL    MCH 29.7 26.0 - 34.0 pg    MCHC 35.4 32.0 - 36.0 g/dL    RDW 15.2 (H) 11.5 - 14.5 %    Platelets 82 (L) 150 - 450 x10*3/uL   Coagulation Screen   Result Value Ref Range    Protime 21.2 (H) 9.8 - 12.8 seconds    INR 1.9 (H) 0.9 - 1.1    aPTT 36 27 - 38 seconds   Calcium, ionized   Result Value Ref Range    POCT Calcium, Ionized 1.06 (L) 1.1 - 1.33 mmol/L   Renal Function Panel   Result Value Ref Range    Glucose 206 (H) 74 - 99 mg/dL    Sodium 142 136 - 145 mmol/L    Potassium 4.7 3.5 -  5.3 mmol/L    Chloride 98 98 - 107 mmol/L    Bicarbonate 16 (L) 21 - 32 mmol/L    Anion Gap 33 (H) 10 - 20 mmol/L    Urea Nitrogen 29 (H) 6 - 23 mg/dL    Creatinine 3.07 (H) 0.50 - 1.30 mg/dL    eGFR 22 (L) >60 mL/min/1.73m*2    Calcium 8.3 (L) 8.6 - 10.6 mg/dL    Phosphorus 6.5 (H) 2.5 - 4.9 mg/dL    Albumin 3.0 (L) 3.4 - 5.0 g/dL   Magnesium   Result Value Ref Range    Magnesium 1.92 1.60 - 2.40 mg/dL   CBC and Auto Differential   Result Value Ref Range    WBC 8.5 4.4 - 11.3 x10*3/uL    nRBC 1.8 (H) 0.0 - 0.0 /100 WBCs    RBC 2.69 (L) 4.50 - 5.90 x10*6/uL    Hemoglobin 8.0 (L) 13.5 - 17.5 g/dL    Hematocrit 22.6 (L) 41.0 - 52.0 %    MCV 84 80 - 100 fL    MCH 29.7 26.0 - 34.0 pg    MCHC 35.4 32.0 - 36.0 g/dL    RDW 15.2 (H) 11.5 - 14.5 %    Platelets 82 (L) 150 - 450 x10*3/uL    Immature Granulocytes %, Automated 1.2 (H) 0.0 - 0.9 %    Immature Granulocytes Absolute, Automated 0.10 0.00 - 0.70 x10*3/uL   Manual Differential   Result Value Ref Range    Neutrophils %, Manual 68.2 40.0 - 80.0 %    Bands %, Manual 12.7 0.0 - 5.0 %    Lymphocytes %, Manual 15.5 13.0 - 44.0 %    Monocytes %, Manual 3.6 2.0 - 10.0 %    Eosinophils %, Manual 0.0 0.0 - 6.0 %    Basophils %, Manual 0.0 0.0 - 2.0 %    Seg Neutrophils Absolute, Manual 5.80 1.20 - 7.00 x10*3/uL    Bands Absolute, Manual 1.08 (H) 0.00 - 0.70 x10*3/uL    Lymphocytes Absolute, Manual 1.32 1.20 - 4.80 x10*3/uL    Monocytes Absolute, Manual 0.31 0.10 - 1.00 x10*3/uL    Eosinophils Absolute, Manual 0.00 0.00 - 0.70 x10*3/uL    Basophils Absolute, Manual 0.00 0.00 - 0.10 x10*3/uL    Total Cells Counted 110     Neutrophils Absolute, Manual 6.88 1.20 - 7.70 x10*3/uL    RBC Morphology See Below     [place] Cells Few    POCT GLUCOSE   Result Value Ref Range    POCT Glucose 194 (H) 74 - 99 mg/dL   Respiratory Culture/Smear    Specimen: Tracheal Aspirate; Fluid   Result Value Ref Range    Gram Stain (2+) Few Polymorphonuclear leukocytes (A)     Gram Stain (2+) Few Gram  positive cocci (A)    Urinalysis with Reflex Culture and Microscopic   Result Value Ref Range    Color, Urine Red (N) Straw, Yellow    Appearance, Urine Turbid (N) Clear    Specific Gravity, Urine 1.025 1.005 - 1.035    pH, Urine 7.5 5.0, 5.5, 6.0, 6.5, 7.0, 7.5, 8.0    Protein, Urine 100 (2+) (A) NEGATIVE, 10 (TRACE) mg/dL    Glucose, Urine 150 (2+) (A) NEGATIVE mg/dL    Blood, Urine 1.0 (3+) (A) NEGATIVE    Ketones, Urine NEGATIVE NEGATIVE mg/dL    Bilirubin, Urine 0.5 (1+) (A) NEGATIVE    Urobilinogen, Urine NORM NORM mg/dL    Nitrite, Urine NEGATIVE NEGATIVE    Leukocyte Esterase, Urine 25 Angelia/µL (A) NEGATIVE   Microscopic Only, Urine   Result Value Ref Range    WBC, Urine 6-10 (A) 1-5, NONE /HPF    RBC, Urine >20 (A) NONE, 1-2, 3-5 /HPF    Bacteria, Urine 1+ (A) NONE SEEN /HPF   Blood Culture    Specimen: Blood culture   Result Value Ref Range    Blood Culture Loaded on Instrument - Culture in progress    POCT GLUCOSE   Result Value Ref Range    POCT Glucose 180 (H) 74 - 99 mg/dL   Blood Gas Arterial Full Panel   Result Value Ref Range    POCT pH, Arterial 7.24 (LL) 7.38 - 7.42 pH    POCT pCO2, Arterial 38 38 - 42 mm Hg    POCT pO2, Arterial 101 (H) 85 - 95 mm Hg    POCT SO2, Arterial 97 94 - 100 %    POCT Oxy Hemoglobin, Arterial 95.2 94.0 - 98.0 %    POCT Hematocrit Calculated, Arterial 27.0 (L) 41.0 - 52.0 %    POCT Sodium, Arterial 138 136 - 145 mmol/L    POCT Potassium, Arterial 4.5 3.5 - 5.3 mmol/L    POCT Chloride, Arterial 99 98 - 107 mmol/L    POCT Ionized Calcium, Arterial 1.09 (L) 1.10 - 1.33 mmol/L    POCT Glucose, Arterial 180 (H) 74 - 99 mg/dL    POCT Lactate, Arterial 11.6 (HH) 0.4 - 2.0 mmol/L    POCT Base Excess, Arterial -10.3 (L) -2.0 - 3.0 mmol/L    POCT HCO3 Calculated, Arterial 16.3 (L) 22.0 - 26.0 mmol/L    POCT Hemoglobin, Arterial 8.9 (L) 13.5 - 17.5 g/dL    POCT Anion Gap, Arterial 27 (H) 10 - 25 mmo/L    Patient Temperature 37.0 degrees Celsius    FiO2 100 %   Blood Gas Arterial Full  Panel   Result Value Ref Range    POCT pH, Arterial 7.24 (LL) 7.38 - 7.42 pH    POCT pCO2, Arterial 46 (H) 38 - 42 mm Hg    POCT pO2, Arterial 78 (L) 85 - 95 mm Hg    POCT SO2, Arterial 97 94 - 100 %    POCT Oxy Hemoglobin, Arterial 94.4 94.0 - 98.0 %    POCT Hematocrit Calculated, Arterial 27.0 (L) 41.0 - 52.0 %    POCT Sodium, Arterial 137 136 - 145 mmol/L    POCT Potassium, Arterial 4.5 3.5 - 5.3 mmol/L    POCT Chloride, Arterial 99 98 - 107 mmol/L    POCT Ionized Calcium, Arterial 1.05 (L) 1.10 - 1.33 mmol/L    POCT Glucose, Arterial 179 (H) 74 - 99 mg/dL    POCT Lactate, Arterial 10.7 (HH) 0.4 - 2.0 mmol/L    POCT Base Excess, Arterial -7.3 (L) -2.0 - 3.0 mmol/L    POCT HCO3 Calculated, Arterial 19.7 (L) 22.0 - 26.0 mmol/L    POCT Hemoglobin, Arterial 9.1 (L) 13.5 - 17.5 g/dL    POCT Anion Gap, Arterial 23 10 - 25 mmo/L    Patient Temperature 37.0 degrees Celsius    FiO2 100 %   CBC   Result Value Ref Range    WBC 9.0 4.4 - 11.3 x10*3/uL    nRBC 2.8 (H) 0.0 - 0.0 /100 WBCs    RBC 2.73 (L) 4.50 - 5.90 x10*6/uL    Hemoglobin 8.1 (L) 13.5 - 17.5 g/dL    Hematocrit 22.9 (L) 41.0 - 52.0 %    MCV 84 80 - 100 fL    MCH 29.7 26.0 - 34.0 pg    MCHC 35.4 32.0 - 36.0 g/dL    RDW 15.6 (H) 11.5 - 14.5 %    Platelets 92 (L) 150 - 450 x10*3/uL   Coagulation Screen   Result Value Ref Range    Protime 21.4 (H) 9.8 - 12.8 seconds    INR 1.9 (H) 0.9 - 1.1    aPTT 35 27 - 38 seconds   Magnesium   Result Value Ref Range    Magnesium 2.09 1.60 - 2.40 mg/dL   Renal Function Panel   Result Value Ref Range    Glucose 182 (H) 74 - 99 mg/dL    Sodium 140 136 - 145 mmol/L    Potassium 4.5 3.5 - 5.3 mmol/L    Chloride 97 (L) 98 - 107 mmol/L    Bicarbonate 21 21 - 32 mmol/L    Anion Gap 27 (H) 10 - 20 mmol/L    Urea Nitrogen 30 (H) 6 - 23 mg/dL    Creatinine 3.01 (H) 0.50 - 1.30 mg/dL    eGFR 23 (L) >60 mL/min/1.73m*2    Calcium 8.2 (L) 8.6 - 10.6 mg/dL    Phosphorus 6.3 (H) 2.5 - 4.9 mg/dL    Albumin 3.0 (L) 3.4 - 5.0 g/dL   Calcium,  ionized   Result Value Ref Range    POCT Calcium, Ionized 1.03 (L) 1.1 - 1.33 mmol/L   Fibrinogen   Result Value Ref Range    Fibrinogen 292 200 - 400 mg/dL   POCT GLUCOSE   Result Value Ref Range    POCT Glucose 179 (H) 74 - 99 mg/dL   Extra Urine Gray Tube   Result Value Ref Range    Extra Tube Hold for add-ons.    Triglycerides   Result Value Ref Range    Triglycerides 595 (H) 0 - 149 mg/dL   POCT GLUCOSE   Result Value Ref Range    POCT Glucose 170 (H) 74 - 99 mg/dL   Blood Culture    Specimen: Peripheral Venipuncture; Blood culture   Result Value Ref Range    Blood Culture Loaded on Instrument - Culture in progress    Lipase   Result Value Ref Range    Lipase 531 (H) 9 - 82 U/L   Amylase   Result Value Ref Range    Amylase 509 (H) 29 - 103 U/L   Blood Gas Arterial Full Panel   Result Value Ref Range    POCT pH, Arterial 7.33 (L) 7.38 - 7.42 pH    POCT pCO2, Arterial 39 38 - 42 mm Hg    POCT pO2, Arterial 90 85 - 95 mm Hg    POCT SO2, Arterial 98 94 - 100 %    POCT Oxy Hemoglobin, Arterial 95.9 94.0 - 98.0 %    POCT Hematocrit Calculated, Arterial 24.0 (L) 41.0 - 52.0 %    POCT Sodium, Arterial 135 (L) 136 - 145 mmol/L    POCT Potassium, Arterial 4.2 3.5 - 5.3 mmol/L    POCT Chloride, Arterial 99 98 - 107 mmol/L    POCT Ionized Calcium, Arterial 1.05 (L) 1.10 - 1.33 mmol/L    POCT Glucose, Arterial 206 (H) 74 - 99 mg/dL    POCT Lactate, Arterial 9.5 (HH) 0.4 - 2.0 mmol/L    POCT Base Excess, Arterial -4.9 (L) -2.0 - 3.0 mmol/L    POCT HCO3 Calculated, Arterial 20.6 (L) 22.0 - 26.0 mmol/L    POCT Hemoglobin, Arterial 8.0 (L) 13.5 - 17.5 g/dL    POCT Anion Gap, Arterial 20 10 - 25 mmo/L    Patient Temperature 37.0 degrees Celsius    FiO2 100 %   Green Top   Result Value Ref Range    Extra Tube Hold for add-ons.    Green Top   Result Value Ref Range    Extra Tube Hold for add-ons.    Green Top   Result Value Ref Range    Extra Tube Hold for add-ons.    POCT GLUCOSE   Result Value Ref Range    POCT Glucose 183 (H)  74 - 99 mg/dL   CBC   Result Value Ref Range    WBC 8.8 4.4 - 11.3 x10*3/uL    nRBC 2.7 (H) 0.0 - 0.0 /100 WBCs    RBC 2.63 (L) 4.50 - 5.90 x10*6/uL    Hemoglobin 8.3 (L) 13.5 - 17.5 g/dL    Hematocrit 22.0 (L) 41.0 - 52.0 %    MCV 84 80 - 100 fL    MCH 31.6 26.0 - 34.0 pg    MCHC 37.7 (H) 32.0 - 36.0 g/dL    RDW 15.4 (H) 11.5 - 14.5 %    Platelets 87 (L) 150 - 450 x10*3/uL   Magnesium   Result Value Ref Range    Magnesium 2.08 1.60 - 2.40 mg/dL   Renal Function Panel   Result Value Ref Range    Glucose 187 (H) 74 - 99 mg/dL    Sodium 137 136 - 145 mmol/L    Potassium 4.1 3.5 - 5.3 mmol/L    Chloride 96 (L) 98 - 107 mmol/L    Bicarbonate 21 21 - 32 mmol/L    Anion Gap 24 (H) 10 - 20 mmol/L    Urea Nitrogen 28 (H) 6 - 23 mg/dL    Creatinine 2.71 (H) 0.50 - 1.30 mg/dL    eGFR 26 (L) >60 mL/min/1.73m*2    Calcium 8.3 (L) 8.6 - 10.6 mg/dL    Phosphorus 4.7 2.5 - 4.9 mg/dL    Albumin 2.9 (L) 3.4 - 5.0 g/dL   POCT GLUCOSE   Result Value Ref Range    POCT Glucose 174 (H) 74 - 99 mg/dL   Prepare RBC: 1 Units   Result Value Ref Range    PRODUCT CODE P7559R96     Unit Number [id number]     Unit ABO O     Unit RH POS     XM INTEP COMP     Dispense Status IS     Blood Expiration Date April 06, 2024 23:59 EDT     PRODUCT BLOOD TYPE 5100     UNIT VOLUME 350    Coagulation Screen   Result Value Ref Range    Protime 22.3 (H) 9.8 - 12.8 seconds    INR 2.0 (H) 0.9 - 1.1    aPTT 36 27 - 38 seconds   Blood Gas Arterial Full Panel   Result Value Ref Range    POCT pH, Arterial 7.33 (L) 7.38 - 7.42 pH    POCT pCO2, Arterial 38 38 - 42 mm Hg    POCT pO2, Arterial 89 85 - 95 mm Hg    POCT SO2, Arterial 98 94 - 100 %    POCT Oxy Hemoglobin, Arterial 95.6 94.0 - 98.0 %    POCT Hematocrit Calculated, Arterial 25.0 (L) 41.0 - 52.0 %    POCT Sodium, Arterial 135 (L) 136 - 145 mmol/L    POCT Potassium, Arterial 4.1 3.5 - 5.3 mmol/L    POCT Chloride, Arterial 98 98 - 107 mmol/L    POCT Ionized Calcium, Arterial 1.10 1.10 - 1.33 mmol/L    POCT  Glucose, Arterial 182 (H) 74 - 99 mg/dL    POCT Lactate, Arterial 8.8 (HH) 0.4 - 2.0 mmol/L    POCT Base Excess, Arterial -5.5 (L) -2.0 - 3.0 mmol/L    POCT HCO3 Calculated, Arterial 20.0 (L) 22.0 - 26.0 mmol/L    POCT Hemoglobin, Arterial 8.2 (L) 13.5 - 17.5 g/dL    POCT Anion Gap, Arterial 21 10 - 25 mmo/L    Patient Temperature 37.0 degrees Celsius    FiO2 90 %   Blood Gas Arterial Full Panel   Result Value Ref Range    POCT pH, Arterial 7.34 (L) 7.38 - 7.42 pH    POCT pCO2, Arterial 37 (L) 38 - 42 mm Hg    POCT pO2, Arterial 85 85 - 95 mm Hg    POCT SO2, Arterial 98 94 - 100 %    POCT Oxy Hemoglobin, Arterial 96.2 94.0 - 98.0 %    POCT Hematocrit Calculated, Arterial 27.0 (L) 41.0 - 52.0 %    POCT Sodium, Arterial 136 136 - 145 mmol/L    POCT Potassium, Arterial 4.1 3.5 - 5.3 mmol/L    POCT Chloride, Arterial 98 98 - 107 mmol/L    POCT Ionized Calcium, Arterial 1.07 (L) 1.10 - 1.33 mmol/L    POCT Glucose, Arterial 171 (H) 74 - 99 mg/dL    POCT Lactate, Arterial 8.8 (HH) 0.4 - 2.0 mmol/L    POCT Base Excess, Arterial -5.3 (L) -2.0 - 3.0 mmol/L    POCT HCO3 Calculated, Arterial 20.0 (L) 22.0 - 26.0 mmol/L    POCT Hemoglobin, Arterial 9.0 (L) 13.5 - 17.5 g/dL    POCT Anion Gap, Arterial 22 10 - 25 mmo/L    Patient Temperature 37.0 degrees Celsius    FiO2 80 %   Vancomycin   Result Value Ref Range    Vancomycin 21.8 (H) 5.0 - 20.0 ug/mL   Hepatic function panel   Result Value Ref Range    Albumin 2.9 (L) 3.4 - 5.0 g/dL    Bilirubin, Total 4.9 (H) 0.0 - 1.2 mg/dL    Bilirubin, Direct 2.9 (H) 0.0 - 0.3 mg/dL    Alkaline Phosphatase 90 33 - 136 U/L    ALT 1,826 (H) 10 - 52 U/L    AST 2,289 (H) 9 - 39 U/L    Total Protein 4.1 (L) 6.4 - 8.2 g/dL   Blood Gas Arterial Full Panel   Result Value Ref Range    POCT pH, Arterial 7.37 (L) 7.38 - 7.42 pH    POCT pCO2, Arterial 36 (L) 38 - 42 mm Hg    POCT pO2, Arterial 86 85 - 95 mm Hg    POCT SO2, Arterial 98 94 - 100 %    POCT Oxy Hemoglobin, Arterial 95.1 94.0 - 98.0 %    POCT  Hematocrit Calculated, Arterial 27.0 (L) 41.0 - 52.0 %    POCT Sodium, Arterial 133 (L) 136 - 145 mmol/L    POCT Potassium, Arterial 4.2 3.5 - 5.3 mmol/L    POCT Chloride, Arterial 98 98 - 107 mmol/L    POCT Ionized Calcium, Arterial 1.07 (L) 1.10 - 1.33 mmol/L    POCT Glucose, Arterial 186 (H) 74 - 99 mg/dL    POCT Lactate, Arterial 8.3 (HH) 0.4 - 2.0 mmol/L    POCT Base Excess, Arterial -4.0 (L) -2.0 - 3.0 mmol/L    POCT HCO3 Calculated, Arterial 20.8 (L) 22.0 - 26.0 mmol/L    POCT Hemoglobin, Arterial 9.0 (L) 13.5 - 17.5 g/dL    POCT Anion Gap, Arterial 18 10 - 25 mmo/L    Patient Temperature 37.0 degrees Celsius    FiO2 80 %   CBC   Result Value Ref Range    WBC 9.6 4.4 - 11.3 x10*3/uL    nRBC 4.1 (H) 0.0 - 0.0 /100 WBCs    RBC 2.87 (L) 4.50 - 5.90 x10*6/uL    Hemoglobin 9.0 (L) 13.5 - 17.5 g/dL    Hematocrit 24.5 (L) 41.0 - 52.0 %    MCV 85 80 - 100 fL    MCH 31.4 26.0 - 34.0 pg    MCHC 36.7 (H) 32.0 - 36.0 g/dL    RDW 15.5 (H) 11.5 - 14.5 %    Platelets 87 (L) 150 - 450 x10*3/uL   Coagulation Screen   Result Value Ref Range    Protime 20.2 (H) 9.8 - 12.8 seconds    INR 1.8 (H) 0.9 - 1.1    aPTT 34 27 - 38 seconds   Magnesium   Result Value Ref Range    Magnesium 2.09 1.60 - 2.40 mg/dL   Calcium, ionized   Result Value Ref Range    POCT Calcium, Ionized 1.05 (L) 1.1 - 1.33 mmol/L   Fibrinogen   Result Value Ref Range    Fibrinogen 285 200 - 400 mg/dL   Basic Metabolic Panel   Result Value Ref Range    Glucose 179 (H) 74 - 99 mg/dL    Sodium 137 136 - 145 mmol/L    Potassium 4.1 3.5 - 5.3 mmol/L    Chloride 96 (L) 98 - 107 mmol/L    Bicarbonate 22 21 - 32 mmol/L    Anion Gap 23 (H) 10 - 20 mmol/L    Urea Nitrogen 27 (H) 6 - 23 mg/dL    Creatinine 2.58 (H) 0.50 - 1.30 mg/dL    eGFR 27 (L) >60 mL/min/1.73m*2    Calcium 8.3 (L) 8.6 - 10.6 mg/dL   Phosphorus   Result Value Ref Range    Phosphorus 3.9 2.5 - 4.9 mg/dL   Blood Gas Arterial Full Panel   Result Value Ref Range    POCT pH, Arterial 7.37 (L) 7.38 - 7.42 pH     POCT pCO2, Arterial 37 (L) 38 - 42 mm Hg    POCT pO2, Arterial 76 (L) 85 - 95 mm Hg    POCT SO2, Arterial 97 94 - 100 %    POCT Oxy Hemoglobin, Arterial 93.7 (L) 94.0 - 98.0 %    POCT Hematocrit Calculated, Arterial 27.0 (L) 41.0 - 52.0 %    POCT Sodium, Arterial 133 (L) 136 - 145 mmol/L    POCT Potassium, Arterial 4.0 3.5 - 5.3 mmol/L    POCT Chloride, Arterial 100 98 - 107 mmol/L    POCT Ionized Calcium, Arterial 1.06 (L) 1.10 - 1.33 mmol/L    POCT Glucose, Arterial 170 (H) 74 - 99 mg/dL    POCT Lactate, Arterial 8.3 (HH) 0.4 - 2.0 mmol/L    POCT Base Excess, Arterial -3.5 (L) -2.0 - 3.0 mmol/L    POCT HCO3 Calculated, Arterial 21.4 (L) 22.0 - 26.0 mmol/L    POCT Hemoglobin, Arterial 8.9 (L) 13.5 - 17.5 g/dL    POCT Anion Gap, Arterial 16 10 - 25 mmo/L    Patient Temperature 37.0 degrees Celsius    FiO2 70 %   POCT GLUCOSE   Result Value Ref Range    POCT Glucose 154 (H) 74 - 99 mg/dL     Current Medications  Scheduled medications  calcium chloride, 1 g, intravenous, Once  hydrocortisone sodium succinate, 50 mg, intravenous, q6h  magnesium sulfate, 2 g, intravenous, Once  micafungin, 100 mg, intravenous, q24h  mupirocin, , Each Nostril, BID  oxygen, , inhalation, Continuous - Inhalation  pantoprazole, 40 mg, intravenous, Daily  piperacillin-tazobactam, 3.375 g, intravenous, q6h  sulfur hexafluoride microsphr, 2 mL, intravenous, Once in imaging  vancomycin, 750 mg, intravenous, q12h      Continuous medications  amiodarone, 1 mg/min, Last Rate: 1 mg/min (03/23/24 0700)  angiotensin II (Giapreza) 2.5 mg in sodium chloride 0.9% 250 mL (0.01 mg/mL) infusion, 1.25-40 ng/kg/min, Last Rate: 40 ng/kg/min (03/23/24 0700)  cisatracurium, 0.5-10 mcg/kg/min (Dosing Weight), Last Rate: 1.7 mcg/kg/min (03/23/24 0700)  EPINEPHrine, 0-2 mcg/kg/min (Dosing Weight), Last Rate: 0.12 mcg/kg/min (03/23/24 0700)  epoprostenol, 0.05 mcg/kg/min (Ideal), Last Rate: 0.05 mcg/kg/min (03/23/24 0842)  fentaNYL,  mcg/hr, Last Rate: 125  mcg/hr (03/23/24 0700)  insulin regular infusion for cardiac surgery, 0-15 Units/hr, Last Rate: 2.5 Units/hr (03/23/24 0700)  lactated Ringer's, 5 mL/hr, Last Rate: 5 mL/hr (03/23/24 0700)  lactated Ringer's, 5 mL/hr, Last Rate: 5 mL/hr (03/23/24 0700)  norepinephrine, 0.1-3 mcg/kg/min (Dosing Weight), Last Rate: 0.5 mcg/kg/min (03/23/24 0700)  PrismaSol BGK 2/3.5, 2,400 mL/hr, Last Rate: 2,400 mL/hr (03/22/24 0907)  propofol, 5-50 mcg/kg/min (Dosing Weight), Last Rate: 50 mcg/kg/min (03/23/24 0700)  vasopressin, 0.01-0.06 Units/min, Last Rate: 0.06 Units/min (03/23/24 0700)      PRN medications  PRN medications: calcium gluconate, calcium gluconate, dextrose, dextrose, dextrose **OR** glucagon, glucagon, HYDROmorphone, insulin regular, magnesium sulfate, magnesium sulfate, potassium chloride, potassium chloride, vancomycin     Assessment  63 y.o. 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 Hawkins and Dr. Smith on 3/5/24.  Post operative course was uncomplicated and patient was on RNF until 3/20 for prolonged bed rest to avoid hip flexion to maintain flap integrity.  Patient was on prophylactic lovenox while on bedrest. Per nursing report, patient was getting to the side of the bed today to get up and walk when he had sudden chest heaviness. Pre-cardiac arrest EKG with signs of anterior ischemia. Nursing was called by family because patient went unresponsive.  ACLS was immediately started. ROSC was achieved after 1 round of CPR, however patient quickly decompensated into PEA.  Airway was secured.  Multiple rounds of CPR were completed with intermittent ROSC prior to transport to [facility].  Patient arrived to [facility] under SICU care with active CPR underway.  Primary concern for STEMI vs massive PE.  Bedside POCUS TTE with  biventricular failure and dilated RV.  RV septal bowing. Decided to give TPA to treat massive PE.  After several minutes of CPR patient achieved  sustained ROSC.  See code documentation note for further details.     3/20: Now s/p CPR with ROSC after TPA, overnight started on heparin, epo, increased pressor requirements, increased swelling at flap site.   3/21: MTP transfusions. Return to OR for exploration of R Flap site now s/p Exploratin of right thigh wound evacuation of hematoma. Suture ligation of multiple bleeding vessel and several points in gluteal area with Dr. Smith.    Plan/Recommendations  S/p Exploratin of right thigh wound evacuation of hematoma. Suture ligation of multiple bleeding vessel and several points in gluteal area:  A/P:   - remains critically ill in [facility] on high dose pressors (Levo/Vaso/Epi/AT2)  - Maintain incisional wound vac to right gluteal/thigh area        ·  Settings: -75mmHg low continuous suction        ·  Notify plastic surgery with any concerns of leak or obstruction   - monitor right thigh for worsening s/s of active bleeding  - Continue ANDERSON drain care per nursing      ·  Strip drain tubing TID and PRN     ·  Monitor and record output q8h      ·  Keep drain sites C/D/I with daily drain dressing changes      ·  Notify plastics if ANDERSON drain output exceeds > 100 ml/hr in one drain or > 300 ml/hr collectively  - Operative findings: Actively Bleeding Gluteal Arterial Perforators, Generalized Oozing, healthy Flap, EBL 6.5L (3L of which was retained Hematoma)   - continue IV abx  - Appreciate remaining care per [facility]  - family updated at bedside  - Plastics to follow     Patient and plan discussed with PHILLIP Cage-CNP   Plastic and Reconstructive Surgery   [place]  Pager #[phone]  Team phones: [phone]

## 2024-03-23 NOTE — PROCEDURES
Intubation    Date/Time: 3/23/2024 5:34 PM    Performed by: Vern Mcneil DO  Authorized by: Vern Mcneil DO    Consent:     Consent obtained:  Verbal (Urgent)    Consent given by:  Spouse    Risks, benefits, and alternatives were discussed: yes      Risks discussed:  Brain injury, bleeding, hypoxia and death  Universal protocol:     Patient identity confirmed:  Verbally with patient, arm band and hospital-assigned identification number  Pre-procedure details:     Indications: airway protection and respiratory failure      Indications comment:  Endotracheal tube currently in place, plan for tube exchange due to significant leak of  ballon and unable to maintain TV    Patient status: Patient sedated on ketamine and propofol.    Look externally comment:  Facial edema    Paralytics:  Rocuronium (Patient on nimbex drip with 2/4 TOF, 50mg of rocuronium given for complete NMB with 0/4 TOF)  Procedure details:     Preoxygenation: 100% FiO2 via ventilator.    CPR in progress: no      Number of attempts:  2  Successful intubation attempt details:     Intubation method:  Oral    Intubation technique: video assisted      Laryngoscope blade:  Hypercurved (Lopro S4 blade)    Grade view: I      Tube size (mm):  8.0    Tube type:  Cuffed    Tube visualized through cords: yes    First unsuccessful intubation attempt details:     Intubation method:  Oral    Intubation method: First attempt using Cook Catheter Tube Exchange, unsuccessful.    Ventilation between 1st and 2nd attempt: no      Ventilation between 1st and 2nd attempt comment:  Patient maintained O2 saturation of 98% and was hemodynamically stable between first and second attempt.  Placement assessment:     ETT at teeth/gumline (cm):  24    Tube secured with:  ETT amaya    Placement verification: colorimetric ETCO2, CXR verification and direct visualization      CXR findings:  Appropriate position  Post-procedure details:     Procedure completion:   Tolerated  Comments:      Endotracheal tube placement confirmation via flexible fiberoptic bronchoscope.  Confirmed placement of ETT above the ariadna.  During bronchoscope, thick colorless mucus suctioned from left lower lobe. Patient tolerated procedure well.  Hemodynamically stable on high dose pressor throughout the procedure.

## 2024-03-23 NOTE — SIGNIFICANT EVENT
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 Juan Carlos Payne on 3/5/4.  Post operatively underwent PEA arrest, MI vs PE, MTP, ligation of R groin by vascular surgery, in CTICU in critical condition. Concerning picture for undifferentiated shock and has been on extremely high doses of Levophed, vasopressin, epinephrine, angiotensin II.  He developed renal failure and is currently on CVVH.  ACS consulted to rule out any potential intra-abdominal source of sepsis in setting of global hypoperfusion. CT performed 3/22 PM showed mild stranding around D2 and D3 of the duodenum and the head of the pancreas but otherwise no clinical significant findings that indicate intraabdominal source of sepsis. No surgical indications, ACS signing off please page with questions.

## 2024-03-23 NOTE — PROGRESS NOTES
NEPHROLOGY FOLLOW UP NOTE    Jason Hollins   63 y.o.      MRN/Room: [id number]/[id number]    Subjective:   A. Fib with RVR overnight, started amio drip. No issues with CVVH. Remains on epi, levo, vaso.  Net + 6L over the last 24 hours.     Objective:     Meds:   albumin human, 25 g, q6h  calcium chloride, 1 g, Once  hydrocortisone sodium succinate, 50 mg, q6h  magnesium sulfate, 2 g, Once  micafungin, 100 mg, q24h  mupirocin, , BID  oxygen, , Continuous - Inhalation  pantoprazole, 40 mg, Daily  piperacillin-tazobactam, 3.375 g, q6h  sulfur hexafluoride microsphr, 2 mL, Once in imaging  vancomycin, 750 mg, q12h  white petrolatum-mineral oiL, 1 Application, q6h      amiodarone, Last Rate: 1 mg/min (03/23/24 1004)  angiotensin II (Giapreza) 2.5 mg in sodium chloride 0.9% 250 mL (0.01 mg/mL) infusion, Last Rate: 40 ng/kg/min (03/23/24 0700)  cisatracurium, Last Rate: 3.003 mcg/kg/min (03/23/24 1300)  EPINEPHrine, Last Rate: 0.12 mcg/kg/min (03/23/24 0800)  epoprostenol, Last Rate: 0.05 mcg/kg/min (03/23/24 1536)  fentaNYL, Last Rate: 125 mcg/hr (03/23/24 1221)  heparin, Last Rate: 1,000 Units/hr (03/23/24 1541)  heparin  insulin regular infusion for cardiac surgery, Last Rate: 2.5 Units/hr (03/23/24 0700)  ketamine, Last Rate: 0.6 mg/kg/hr (03/23/24 1300)  lactated Ringer's, Last Rate: 5 mL/hr (03/23/24 0700)  lactated Ringer's, Last Rate: 5 mL/hr (03/23/24 0700)  norepinephrine, Last Rate: 0.454 mcg/kg/min (03/23/24 1330)  PrismaSol BGK 2/3.5, Last Rate: 2,400 mL/hr (03/23/24 1225)  propofol, Last Rate: 50 mcg/kg/min (03/23/24 1345)  vasopressin, Last Rate: 0.06 Units/min (03/23/24 1006)      calcium gluconate, 1 g, q6h PRN  calcium gluconate, 2 g, q6h PRN  dextrose, 12.5 g, q15 min PRN  dextrose, 25 g, q15 min PRN  dextrose, 25 g, q15 min PRN   Or  glucagon, 1 mg, q15 min PRN  glucagon, 1 mg, q15 min PRN  heparin, 3,000-4,000 Units, q4h PRN  HYDROmorphone, 0.2 mg, q4h PRN  insulin regular, 0-15 Units, PRN  magnesium sulfate, 2  g, q6h PRN  magnesium sulfate, 4 g, q6h PRN  potassium chloride, 20 mEq, q6h PRN  potassium chloride, 40 mEq, q6h PRN  vancomycin, , Daily PRN        Vitals:    03/23/24 1300   BP:    Pulse: (!) 121   Resp: (!) 28   Temp:    SpO2: 94%          Intake/Output Summary (Last 24 hours) at 3/23/2024 1544  Last data filed at 3/23/2024 1300  Gross per 24 hour   Intake 5599.45 ml   Output 792 ml   Net 4807.45 ml       General appearance: intubated and sedated   Heart: RRR   Lungs:  rhonchi, intubated  Abdomen: soft, non-distended   Extremities: edematous 1+ lower extremities   : daniels    ACCESS:   left femoral trialysis     Blood Labs:  Results for orders placed or performed during the hospital encounter of 03/05/24 (from the past 24 hour(s))   Blood Gas Arterial Full Panel   Result Value Ref Range    POCT pH, Arterial 7.24 (LL) 7.38 - 7.42 pH    POCT pCO2, Arterial 46 (H) 38 - 42 mm Hg    POCT pO2, Arterial 78 (L) 85 - 95 mm Hg    POCT SO2, Arterial 97 94 - 100 %    POCT Oxy Hemoglobin, Arterial 94.4 94.0 - 98.0 %    POCT Hematocrit Calculated, Arterial 27.0 (L) 41.0 - 52.0 %    POCT Sodium, Arterial 137 136 - 145 mmol/L    POCT Potassium, Arterial 4.5 3.5 - 5.3 mmol/L    POCT Chloride, Arterial 99 98 - 107 mmol/L    POCT Ionized Calcium, Arterial 1.05 (L) 1.10 - 1.33 mmol/L    POCT Glucose, Arterial 179 (H) 74 - 99 mg/dL    POCT Lactate, Arterial 10.7 (HH) 0.4 - 2.0 mmol/L    POCT Base Excess, Arterial -7.3 (L) -2.0 - 3.0 mmol/L    POCT HCO3 Calculated, Arterial 19.7 (L) 22.0 - 26.0 mmol/L    POCT Hemoglobin, Arterial 9.1 (L) 13.5 - 17.5 g/dL    POCT Anion Gap, Arterial 23 10 - 25 mmo/L    Patient Temperature 37.0 degrees Celsius    FiO2 100 %   CBC   Result Value Ref Range    WBC 9.0 4.4 - 11.3 x10*3/uL    nRBC 2.8 (H) 0.0 - 0.0 /100 WBCs    RBC 2.73 (L) 4.50 - 5.90 x10*6/uL    Hemoglobin 8.1 (L) 13.5 - 17.5 g/dL    Hematocrit 22.9 (L) 41.0 - 52.0 %    MCV 84 80 - 100 fL    MCH 29.7 26.0 - 34.0 pg    MCHC 35.4 32.0 -  36.0 g/dL    RDW 15.6 (H) 11.5 - 14.5 %    Platelets 92 (L) 150 - 450 x10*3/uL   Coagulation Screen   Result Value Ref Range    Protime 21.4 (H) 9.8 - 12.8 seconds    INR 1.9 (H) 0.9 - 1.1    aPTT 35 27 - 38 seconds   Magnesium   Result Value Ref Range    Magnesium 2.09 1.60 - 2.40 mg/dL   Renal Function Panel   Result Value Ref Range    Glucose 182 (H) 74 - 99 mg/dL    Sodium 140 136 - 145 mmol/L    Potassium 4.5 3.5 - 5.3 mmol/L    Chloride 97 (L) 98 - 107 mmol/L    Bicarbonate 21 21 - 32 mmol/L    Anion Gap 27 (H) 10 - 20 mmol/L    Urea Nitrogen 30 (H) 6 - 23 mg/dL    Creatinine 3.01 (H) 0.50 - 1.30 mg/dL    eGFR 23 (L) >60 mL/min/1.73m*2    Calcium 8.2 (L) 8.6 - 10.6 mg/dL    Phosphorus 6.3 (H) 2.5 - 4.9 mg/dL    Albumin 3.0 (L) 3.4 - 5.0 g/dL   Calcium, ionized   Result Value Ref Range    POCT Calcium, Ionized 1.03 (L) 1.1 - 1.33 mmol/L   Fibrinogen   Result Value Ref Range    Fibrinogen 292 200 - 400 mg/dL   POCT GLUCOSE   Result Value Ref Range    POCT Glucose 179 (H) 74 - 99 mg/dL   Extra Urine Gray Tube   Result Value Ref Range    Extra Tube Hold for add-ons.    Triglycerides   Result Value Ref Range    Triglycerides 595 (H) 0 - 149 mg/dL   POCT GLUCOSE   Result Value Ref Range    POCT Glucose 170 (H) 74 - 99 mg/dL   Blood Culture    Specimen: Peripheral Venipuncture; Blood culture   Result Value Ref Range    Blood Culture Loaded on Instrument - Culture in progress    Lipase   Result Value Ref Range    Lipase 531 (H) 9 - 82 U/L   Amylase   Result Value Ref Range    Amylase 509 (H) 29 - 103 U/L   Blood Gas Arterial Full Panel   Result Value Ref Range    POCT pH, Arterial 7.33 (L) 7.38 - 7.42 pH    POCT pCO2, Arterial 39 38 - 42 mm Hg    POCT pO2, Arterial 90 85 - 95 mm Hg    POCT SO2, Arterial 98 94 - 100 %    POCT Oxy Hemoglobin, Arterial 95.9 94.0 - 98.0 %    POCT Hematocrit Calculated, Arterial 24.0 (L) 41.0 - 52.0 %    POCT Sodium, Arterial 135 (L) 136 - 145 mmol/L    POCT Potassium, Arterial 4.2 3.5 -  5.3 mmol/L    POCT Chloride, Arterial 99 98 - 107 mmol/L    POCT Ionized Calcium, Arterial 1.05 (L) 1.10 - 1.33 mmol/L    POCT Glucose, Arterial 206 (H) 74 - 99 mg/dL    POCT Lactate, Arterial 9.5 (HH) 0.4 - 2.0 mmol/L    POCT Base Excess, Arterial -4.9 (L) -2.0 - 3.0 mmol/L    POCT HCO3 Calculated, Arterial 20.6 (L) 22.0 - 26.0 mmol/L    POCT Hemoglobin, Arterial 8.0 (L) 13.5 - 17.5 g/dL    POCT Anion Gap, Arterial 20 10 - 25 mmo/L    Patient Temperature 37.0 degrees Celsius    FiO2 100 %   Green Top   Result Value Ref Range    Extra Tube Hold for add-ons.    Green Top   Result Value Ref Range    Extra Tube Hold for add-ons.    Green Top   Result Value Ref Range    Extra Tube Hold for add-ons.    POCT GLUCOSE   Result Value Ref Range    POCT Glucose 183 (H) 74 - 99 mg/dL   CBC   Result Value Ref Range    WBC 8.8 4.4 - 11.3 x10*3/uL    nRBC 2.7 (H) 0.0 - 0.0 /100 WBCs    RBC 2.63 (L) 4.50 - 5.90 x10*6/uL    Hemoglobin 8.3 (L) 13.5 - 17.5 g/dL    Hematocrit 22.0 (L) 41.0 - 52.0 %    MCV 84 80 - 100 fL    MCH 31.6 26.0 - 34.0 pg    MCHC 37.7 (H) 32.0 - 36.0 g/dL    RDW 15.4 (H) 11.5 - 14.5 %    Platelets 87 (L) 150 - 450 x10*3/uL   Magnesium   Result Value Ref Range    Magnesium 2.08 1.60 - 2.40 mg/dL   Renal Function Panel   Result Value Ref Range    Glucose 187 (H) 74 - 99 mg/dL    Sodium 137 136 - 145 mmol/L    Potassium 4.1 3.5 - 5.3 mmol/L    Chloride 96 (L) 98 - 107 mmol/L    Bicarbonate 21 21 - 32 mmol/L    Anion Gap 24 (H) 10 - 20 mmol/L    Urea Nitrogen 28 (H) 6 - 23 mg/dL    Creatinine 2.71 (H) 0.50 - 1.30 mg/dL    eGFR 26 (L) >60 mL/min/1.73m*2    Calcium 8.3 (L) 8.6 - 10.6 mg/dL    Phosphorus 4.7 2.5 - 4.9 mg/dL    Albumin 2.9 (L) 3.4 - 5.0 g/dL   POCT GLUCOSE   Result Value Ref Range    POCT Glucose 174 (H) 74 - 99 mg/dL   Prepare RBC: 1 Units   Result Value Ref Range    PRODUCT CODE R3692T32     Unit Number [id number]     Unit ABO O     Unit RH POS     XM INTEP COMP     Dispense Status IS     Blood  Expiration Date April 06, 2024 23:59 EDT     PRODUCT BLOOD TYPE 5100     UNIT VOLUME 350    Coagulation Screen   Result Value Ref Range    Protime 22.3 (H) 9.8 - 12.8 seconds    INR 2.0 (H) 0.9 - 1.1    aPTT 36 27 - 38 seconds   Blood Gas Arterial Full Panel   Result Value Ref Range    POCT pH, Arterial 7.33 (L) 7.38 - 7.42 pH    POCT pCO2, Arterial 38 38 - 42 mm Hg    POCT pO2, Arterial 89 85 - 95 mm Hg    POCT SO2, Arterial 98 94 - 100 %    POCT Oxy Hemoglobin, Arterial 95.6 94.0 - 98.0 %    POCT Hematocrit Calculated, Arterial 25.0 (L) 41.0 - 52.0 %    POCT Sodium, Arterial 135 (L) 136 - 145 mmol/L    POCT Potassium, Arterial 4.1 3.5 - 5.3 mmol/L    POCT Chloride, Arterial 98 98 - 107 mmol/L    POCT Ionized Calcium, Arterial 1.10 1.10 - 1.33 mmol/L    POCT Glucose, Arterial 182 (H) 74 - 99 mg/dL    POCT Lactate, Arterial 8.8 (HH) 0.4 - 2.0 mmol/L    POCT Base Excess, Arterial -5.5 (L) -2.0 - 3.0 mmol/L    POCT HCO3 Calculated, Arterial 20.0 (L) 22.0 - 26.0 mmol/L    POCT Hemoglobin, Arterial 8.2 (L) 13.5 - 17.5 g/dL    POCT Anion Gap, Arterial 21 10 - 25 mmo/L    Patient Temperature 37.0 degrees Celsius    FiO2 90 %   Blood Gas Arterial Full Panel   Result Value Ref Range    POCT pH, Arterial 7.34 (L) 7.38 - 7.42 pH    POCT pCO2, Arterial 37 (L) 38 - 42 mm Hg    POCT pO2, Arterial 85 85 - 95 mm Hg    POCT SO2, Arterial 98 94 - 100 %    POCT Oxy Hemoglobin, Arterial 96.2 94.0 - 98.0 %    POCT Hematocrit Calculated, Arterial 27.0 (L) 41.0 - 52.0 %    POCT Sodium, Arterial 136 136 - 145 mmol/L    POCT Potassium, Arterial 4.1 3.5 - 5.3 mmol/L    POCT Chloride, Arterial 98 98 - 107 mmol/L    POCT Ionized Calcium, Arterial 1.07 (L) 1.10 - 1.33 mmol/L    POCT Glucose, Arterial 171 (H) 74 - 99 mg/dL    POCT Lactate, Arterial 8.8 (HH) 0.4 - 2.0 mmol/L    POCT Base Excess, Arterial -5.3 (L) -2.0 - 3.0 mmol/L    POCT HCO3 Calculated, Arterial 20.0 (L) 22.0 - 26.0 mmol/L    POCT Hemoglobin, Arterial 9.0 (L) 13.5 - 17.5 g/dL     POCT Anion Gap, Arterial 22 10 - 25 mmo/L    Patient Temperature 37.0 degrees Celsius    FiO2 80 %   Vancomycin   Result Value Ref Range    Vancomycin 21.8 (H) 5.0 - 20.0 ug/mL   Hepatic function panel   Result Value Ref Range    Albumin 2.9 (L) 3.4 - 5.0 g/dL    Bilirubin, Total 4.9 (H) 0.0 - 1.2 mg/dL    Bilirubin, Direct 2.9 (H) 0.0 - 0.3 mg/dL    Alkaline Phosphatase 90 33 - 136 U/L    ALT 1,826 (H) 10 - 52 U/L    AST 2,289 (H) 9 - 39 U/L    Total Protein 4.1 (L) 6.4 - 8.2 g/dL   Blood Gas Arterial Full Panel   Result Value Ref Range    POCT pH, Arterial 7.37 (L) 7.38 - 7.42 pH    POCT pCO2, Arterial 36 (L) 38 - 42 mm Hg    POCT pO2, Arterial 86 85 - 95 mm Hg    POCT SO2, Arterial 98 94 - 100 %    POCT Oxy Hemoglobin, Arterial 95.1 94.0 - 98.0 %    POCT Hematocrit Calculated, Arterial 27.0 (L) 41.0 - 52.0 %    POCT Sodium, Arterial 133 (L) 136 - 145 mmol/L    POCT Potassium, Arterial 4.2 3.5 - 5.3 mmol/L    POCT Chloride, Arterial 98 98 - 107 mmol/L    POCT Ionized Calcium, Arterial 1.07 (L) 1.10 - 1.33 mmol/L    POCT Glucose, Arterial 186 (H) 74 - 99 mg/dL    POCT Lactate, Arterial 8.3 (HH) 0.4 - 2.0 mmol/L    POCT Base Excess, Arterial -4.0 (L) -2.0 - 3.0 mmol/L    POCT HCO3 Calculated, Arterial 20.8 (L) 22.0 - 26.0 mmol/L    POCT Hemoglobin, Arterial 9.0 (L) 13.5 - 17.5 g/dL    POCT Anion Gap, Arterial 18 10 - 25 mmo/L    Patient Temperature 37.0 degrees Celsius    FiO2 80 %   CBC   Result Value Ref Range    WBC 9.6 4.4 - 11.3 x10*3/uL    nRBC 4.1 (H) 0.0 - 0.0 /100 WBCs    RBC 2.87 (L) 4.50 - 5.90 x10*6/uL    Hemoglobin 9.0 (L) 13.5 - 17.5 g/dL    Hematocrit 24.5 (L) 41.0 - 52.0 %    MCV 85 80 - 100 fL    MCH 31.4 26.0 - 34.0 pg    MCHC 36.7 (H) 32.0 - 36.0 g/dL    RDW 15.5 (H) 11.5 - 14.5 %    Platelets 87 (L) 150 - 450 x10*3/uL   Coagulation Screen   Result Value Ref Range    Protime 20.2 (H) 9.8 - 12.8 seconds    INR 1.8 (H) 0.9 - 1.1    aPTT 34 27 - 38 seconds   Magnesium   Result Value Ref Range     Magnesium 2.09 1.60 - 2.40 mg/dL   Calcium, ionized   Result Value Ref Range    POCT Calcium, Ionized 1.05 (L) 1.1 - 1.33 mmol/L   Fibrinogen   Result Value Ref Range    Fibrinogen 285 200 - 400 mg/dL   Basic Metabolic Panel   Result Value Ref Range    Glucose 179 (H) 74 - 99 mg/dL    Sodium 137 136 - 145 mmol/L    Potassium 4.1 3.5 - 5.3 mmol/L    Chloride 96 (L) 98 - 107 mmol/L    Bicarbonate 22 21 - 32 mmol/L    Anion Gap 23 (H) 10 - 20 mmol/L    Urea Nitrogen 27 (H) 6 - 23 mg/dL    Creatinine 2.58 (H) 0.50 - 1.30 mg/dL    eGFR 27 (L) >60 mL/min/1.73m*2    Calcium 8.3 (L) 8.6 - 10.6 mg/dL   Phosphorus   Result Value Ref Range    Phosphorus 3.9 2.5 - 4.9 mg/dL   Lipase   Result Value Ref Range    Lipase 422 (H) 9 - 82 U/L   Creatine Kinase   Result Value Ref Range    Creatine Kinase 2,746 (H) 0 - 325 U/L   Amylase   Result Value Ref Range    Amylase 437 (H) 29 - 103 U/L   Lactate dehydrogenase   Result Value Ref Range    LDH 2,263 (H) 84 - 246 U/L   HIV 1/2 Antigen/Antibody Screen with Reflex to Confirmation   Result Value Ref Range    HIV 1/2 Antigen/Antibody Screen with Reflex to Confirmation Nonreactive Nonreactive   Blood Gas Arterial Full Panel   Result Value Ref Range    POCT pH, Arterial 7.37 (L) 7.38 - 7.42 pH    POCT pCO2, Arterial 37 (L) 38 - 42 mm Hg    POCT pO2, Arterial 76 (L) 85 - 95 mm Hg    POCT SO2, Arterial 97 94 - 100 %    POCT Oxy Hemoglobin, Arterial 93.7 (L) 94.0 - 98.0 %    POCT Hematocrit Calculated, Arterial 27.0 (L) 41.0 - 52.0 %    POCT Sodium, Arterial 133 (L) 136 - 145 mmol/L    POCT Potassium, Arterial 4.0 3.5 - 5.3 mmol/L    POCT Chloride, Arterial 100 98 - 107 mmol/L    POCT Ionized Calcium, Arterial 1.06 (L) 1.10 - 1.33 mmol/L    POCT Glucose, Arterial 170 (H) 74 - 99 mg/dL    POCT Lactate, Arterial 8.3 (HH) 0.4 - 2.0 mmol/L    POCT Base Excess, Arterial -3.5 (L) -2.0 - 3.0 mmol/L    POCT HCO3 Calculated, Arterial 21.4 (L) 22.0 - 26.0 mmol/L    POCT Hemoglobin, Arterial 8.9 (L)  13.5 - 17.5 g/dL    POCT Anion Gap, Arterial 16 10 - 25 mmo/L    Patient Temperature 37.0 degrees Celsius    FiO2 70 %   POCT GLUCOSE   Result Value Ref Range    POCT Glucose 154 (H) 74 - 99 mg/dL   POCT GLUCOSE   Result Value Ref Range    POCT Glucose 153 (H) 74 - 99 mg/dL   Blood Gas Arterial Full Panel   Result Value Ref Range    POCT pH, Arterial 7.37 (L) 7.38 - 7.42 pH    POCT pCO2, Arterial 35 (L) 38 - 42 mm Hg    POCT pO2, Arterial 79 (L) 85 - 95 mm Hg    POCT SO2, Arterial 98 94 - 100 %    POCT Oxy Hemoglobin, Arterial 96.3 94.0 - 98.0 %    POCT Hematocrit Calculated, Arterial 26.0 (L) 41.0 - 52.0 %    POCT Sodium, Arterial 133 (L) 136 - 145 mmol/L    POCT Potassium, Arterial 4.0 3.5 - 5.3 mmol/L    POCT Chloride, Arterial 99 98 - 107 mmol/L    POCT Ionized Calcium, Arterial 1.09 (L) 1.10 - 1.33 mmol/L    POCT Glucose, Arterial 155 (H) 74 - 99 mg/dL    POCT Lactate, Arterial 7.3 (HH) 0.4 - 2.0 mmol/L    POCT Base Excess, Arterial -4.6 (L) -2.0 - 3.0 mmol/L    POCT HCO3 Calculated, Arterial 20.2 (L) 22.0 - 26.0 mmol/L    POCT Hemoglobin, Arterial 8.6 (L) 13.5 - 17.5 g/dL    POCT Anion Gap, Arterial 18 10 - 25 mmo/L    Patient Temperature 37.0 degrees Celsius    FiO2 60 %   Blood Gas Arterial Full Panel   Result Value Ref Range    POCT pH, Arterial 7.39 7.38 - 7.42 pH    POCT pCO2, Arterial 33 (L) 38 - 42 mm Hg    POCT pO2, Arterial 82 (L) 85 - 95 mm Hg    POCT SO2, Arterial 98 94 - 100 %    POCT Oxy Hemoglobin, Arterial 96.1 94.0 - 98.0 %    POCT Hematocrit Calculated, Arterial 31.0 (L) 41.0 - 52.0 %    POCT Sodium, Arterial 136 136 - 145 mmol/L    POCT Potassium, Arterial 4.6 3.5 - 5.3 mmol/L    POCT Chloride, Arterial 101 98 - 107 mmol/L    POCT Ionized Calcium, Arterial 1.07 (L) 1.10 - 1.33 mmol/L    POCT Glucose, Arterial 168 (H) 74 - 99 mg/dL    POCT Lactate, Arterial 7.6 (HH) 0.4 - 2.0 mmol/L    POCT Base Excess, Arterial -4.3 (L) -2.0 - 3.0 mmol/L    POCT HCO3 Calculated, Arterial 20.0 (L) 22.0 - 26.0  mmol/L    POCT Hemoglobin, Arterial 10.4 (L) 13.5 - 17.5 g/dL    POCT Anion Gap, Arterial 20 10 - 25 mmo/L    Patient Temperature 37.0 degrees Celsius    FiO2 60 %   POCT GLUCOSE   Result Value Ref Range    POCT Glucose 153 (H) 74 - 99 mg/dL          ASSESSMENT:  Jason Hollins is a 63 y.o. male with PMH of DM2, HLD, myxoid chondrosarcoma s/p wide resection sarcoma, sciatic nerve neurolysis of right lower extremity with right gluteal reconstruction, pedicle ALT, vastus lateralus flap, pedicle gluteal and perisformis flap 3/5/24. On 3/20, he developed massive PE  and PEA arrest and was given TPA, s/p hematoma exploration and evacuation. Nephrology consulted for RUTH, started on CVVH 3/22     RUTH, anuric, stage III  -Etiology: Hemodynamically mediated RUTH 2/2 shock   -Baseline Cr: 0.6   -Urine output:  anuric    - volume status: overloaded, body wall edema and effusion on CT c/a/p  -Imaging: CXR ( 3/23): Pulmonary edema   -CRRT started 3/22 via femoral trialysis  - CT 3/22 with no hydronephrosis    Shock  Transaminitis secondary to shock liver       RECOMMENDATIONS:  - c/w CRRT, continue on 2K bath for now  - UF per primary team  - maintain MAPs > 65  - supplement and monitor phos daily while on CVVH  - Dose medications for CVVH  - Strict I/O       Hany Hidalgo MD  Nephrology Fellow   24 hour Renal Pager [phone]

## 2024-03-23 NOTE — NURSING NOTE
0930 CXR done   Helical Rim Advancement Flap Text: The defect edges were debeveled with a #15 blade scalpel.  Given the location of the defect and the proximity to free margins (helical rim) a double helical rim advancement flap was deemed most appropriate.  Using a sterile surgical marker, the appropriate advancement flaps were drawn incorporating the defect and placing the expected incisions between the helical rim and antihelix where possible.  The area thus outlined was incised through and through with a #15 scalpel blade.  With a skin hook and iris scissors, the flaps were gently and sharply undermined and freed up.

## 2024-03-23 NOTE — NURSING NOTE
At 2115 pt went into afib RVR with MD Lenny JAMES at bedside. Pt pressures dropped during event to MAP of 58 with BP systolic in 90s. EKG obtained confirming afib RVR. Per [facility] team, amio bolus of 150mg given and gtt started at. Amylase and lipase labs drawn. Post bolus, pt HR still in 130-140's and BP still low

## 2024-03-23 NOTE — PROGRESS NOTES
"Jason Hollins is a 63 y.o. male on day 18 of admission presenting with Soft tissue sarcoma (CMS/HCC).    Subjective   Decreased FiO2 to 70%  Gave 1 rbc  Afib RVR -> amio 150mg x2, amio infusion 1mg/min       Objective     Physical Exam  Constitutional:       Interventions: He is sedated, chemically paralyzed, intubated and restrained.   HENT:      Head:      Comments: Edematous face/head/neck     Mouth/Throat:      Mouth: Mucous membranes are moist.      Comments: Orotracheal intubation  Eyes:      Comments: Pupils pinpoint, equal   Cardiovascular:      Rate and Rhythm: Tachycardia present. Rhythm irregularly irregular.      Pulses:           Radial pulses are 1+ on the right side and 1+ on the left side.        Dorsalis pedis pulses are 1+ on the right side and 1+ on the left side.   Pulmonary:      Effort: He is intubated.      Breath sounds: Rhonchi present.      Comments: Mechanically ventilated via ETT. Scattered rhonchi, diminished breath sounds bilaterally. Equal chest rise.  Abdominal:      General: Abdomen is protuberant. Bowel sounds are absent. There is distension.      Palpations: Abdomen is soft.      Comments: OG present   Genitourinary:     Comments: Marino present, minimal urine  Skin:     General: Skin is dry.      Comments: Right flap site with wound VAC, no drainage. ANDERSON x 3 all with dark sanguinous drainage   Neurological:      Mental Status: He is unresponsive.      GCS: GCS eye subscore is 1. GCS verbal subscore is 1. GCS motor subscore is 1.           Last Recorded Vitals  Blood pressure 122/60, pulse (!) 133, temperature 36.6 °C (97.9 °F), temperature source Bladder, resp. rate (!) 28, height 1.778 m (5' 10\"), weight 129 kg (284 lb 2.8 oz), SpO2 94 %.    VS over last 24h  Heart Rate:  [107-153]   Temp:  [35.8 °C (96.4 °F)-36.8 °C (98.2 °F)]   Resp:  [23-30]   BP: (107-122)/(48-60)   SpO2:  [93 %-100 %]    Intake/Output last 3 Shifts:  I/O last 3 completed shifts:  In: 9784.9 (75.9 mL/kg) " [I.V.:6978.4 (54.1 mL/kg); Blood:457.6; IV Piggyback:2348.9]  Out: 992 (7.7 mL/kg) [Urine:15 (0 mL/kg/hr); Drains:945; Other:32]  Weight: 128.9 kg       Intake/Output Summary (Last 24 hours) at 3/23/2024 0811  Last data filed at 3/23/2024 0700  Gross per 24 hour   Intake 6408.27 ml   Output 658 ml   Net 5750.27 ml        Relevant Results  Results for orders placed or performed during the hospital encounter of 03/05/24 (from the past 24 hour(s))   Transthoracic Echo (TTE) Limited   Result Value Ref Range    Tricuspid annular plane systolic excursion 12.7 cm    BSA 2.52 m2   Blood Gas Arterial Full Panel   Result Value Ref Range    POCT pH, Arterial 7.24 (LL) 7.38 - 7.42 pH    POCT pCO2, Arterial 35 (L) 38 - 42 mm Hg    POCT pO2, Arterial 95 85 - 95 mm Hg    POCT SO2, Arterial 99 94 - 100 %    POCT Oxy Hemoglobin, Arterial 96.0 94.0 - 98.0 %    POCT Hematocrit Calculated, Arterial 28.0 (L) 41.0 - 52.0 %    POCT Sodium, Arterial 136 136 - 145 mmol/L    POCT Potassium, Arterial 4.8 3.5 - 5.3 mmol/L    POCT Chloride, Arterial 99 98 - 107 mmol/L    POCT Ionized Calcium, Arterial 1.05 (L) 1.10 - 1.33 mmol/L    POCT Glucose, Arterial 210 (H) 74 - 99 mg/dL    POCT Lactate, Arterial 14.7 (HH) 0.4 - 2.0 mmol/L    POCT Base Excess, Arterial -11.4 (L) -2.0 - 3.0 mmol/L    POCT HCO3 Calculated, Arterial 15.0 (L) 22.0 - 26.0 mmol/L    POCT Hemoglobin, Arterial 9.3 (L) 13.5 - 17.5 g/dL    POCT Anion Gap, Arterial 27 (H) 10 - 25 mmo/L    Patient Temperature 37.0 degrees Celsius    FiO2 100 %   Creatine Kinase   Result Value Ref Range    Creatine Kinase 3,619 (H) 0 - 325 U/L   Hepatic Function Panel   Result Value Ref Range    Albumin 3.0 (L) 3.4 - 5.0 g/dL    Bilirubin, Total 4.2 (H) 0.0 - 1.2 mg/dL    Bilirubin, Direct 3.0 (H) 0.0 - 0.3 mg/dL    Alkaline Phosphatase 69 33 - 136 U/L    ALT 1,618 (H) 10 - 52 U/L    AST 3,786 (H) 9 - 39 U/L    Total Protein 4.4 (L) 6.4 - 8.2 g/dL   Ferritin   Result Value Ref Range    Ferritin 6,300  (H) 20 - 300 ng/mL   Amylase   Result Value Ref Range    Amylase 649 (H) 29 - 103 U/L   CBC   Result Value Ref Range    WBC 8.5 4.4 - 11.3 x10*3/uL    nRBC 1.8 (H) 0.0 - 0.0 /100 WBCs    RBC 2.69 (L) 4.50 - 5.90 x10*6/uL    Hemoglobin 8.0 (L) 13.5 - 17.5 g/dL    Hematocrit 22.6 (L) 41.0 - 52.0 %    MCV 84 80 - 100 fL    MCH 29.7 26.0 - 34.0 pg    MCHC 35.4 32.0 - 36.0 g/dL    RDW 15.2 (H) 11.5 - 14.5 %    Platelets 82 (L) 150 - 450 x10*3/uL   Coagulation Screen   Result Value Ref Range    Protime 21.2 (H) 9.8 - 12.8 seconds    INR 1.9 (H) 0.9 - 1.1    aPTT 36 27 - 38 seconds   Calcium, ionized   Result Value Ref Range    POCT Calcium, Ionized 1.06 (L) 1.1 - 1.33 mmol/L   Renal Function Panel   Result Value Ref Range    Glucose 206 (H) 74 - 99 mg/dL    Sodium 142 136 - 145 mmol/L    Potassium 4.7 3.5 - 5.3 mmol/L    Chloride 98 98 - 107 mmol/L    Bicarbonate 16 (L) 21 - 32 mmol/L    Anion Gap 33 (H) 10 - 20 mmol/L    Urea Nitrogen 29 (H) 6 - 23 mg/dL    Creatinine 3.07 (H) 0.50 - 1.30 mg/dL    eGFR 22 (L) >60 mL/min/1.73m*2    Calcium 8.3 (L) 8.6 - 10.6 mg/dL    Phosphorus 6.5 (H) 2.5 - 4.9 mg/dL    Albumin 3.0 (L) 3.4 - 5.0 g/dL   Magnesium   Result Value Ref Range    Magnesium 1.92 1.60 - 2.40 mg/dL   CBC and Auto Differential   Result Value Ref Range    WBC 8.5 4.4 - 11.3 x10*3/uL    nRBC 1.8 (H) 0.0 - 0.0 /100 WBCs    RBC 2.69 (L) 4.50 - 5.90 x10*6/uL    Hemoglobin 8.0 (L) 13.5 - 17.5 g/dL    Hematocrit 22.6 (L) 41.0 - 52.0 %    MCV 84 80 - 100 fL    MCH 29.7 26.0 - 34.0 pg    MCHC 35.4 32.0 - 36.0 g/dL    RDW 15.2 (H) 11.5 - 14.5 %    Platelets 82 (L) 150 - 450 x10*3/uL    Immature Granulocytes %, Automated 1.2 (H) 0.0 - 0.9 %    Immature Granulocytes Absolute, Automated 0.10 0.00 - 0.70 x10*3/uL   Manual Differential   Result Value Ref Range    Neutrophils %, Manual 68.2 40.0 - 80.0 %    Bands %, Manual 12.7 0.0 - 5.0 %    Lymphocytes %, Manual 15.5 13.0 - 44.0 %    Monocytes %, Manual 3.6 2.0 - 10.0 %     Eosinophils %, Manual 0.0 0.0 - 6.0 %    Basophils %, Manual 0.0 0.0 - 2.0 %    Seg Neutrophils Absolute, Manual 5.80 1.20 - 7.00 x10*3/uL    Bands Absolute, Manual 1.08 (H) 0.00 - 0.70 x10*3/uL    Lymphocytes Absolute, Manual 1.32 1.20 - 4.80 x10*3/uL    Monocytes Absolute, Manual 0.31 0.10 - 1.00 x10*3/uL    Eosinophils Absolute, Manual 0.00 0.00 - 0.70 x10*3/uL    Basophils Absolute, Manual 0.00 0.00 - 0.10 x10*3/uL    Total Cells Counted 110     Neutrophils Absolute, Manual 6.88 1.20 - 7.70 x10*3/uL    RBC Morphology See Below     Rasta Cells Few    POCT GLUCOSE   Result Value Ref Range    POCT Glucose 194 (H) 74 - 99 mg/dL   Respiratory Culture/Smear    Specimen: Tracheal Aspirate; Fluid   Result Value Ref Range    Gram Stain (2+) Few Polymorphonuclear leukocytes (A)     Gram Stain (2+) Few Gram positive cocci (A)    Urinalysis with Reflex Culture and Microscopic   Result Value Ref Range    Color, Urine Red (N) Straw, Yellow    Appearance, Urine Turbid (N) Clear    Specific Gravity, Urine 1.025 1.005 - 1.035    pH, Urine 7.5 5.0, 5.5, 6.0, 6.5, 7.0, 7.5, 8.0    Protein, Urine 100 (2+) (A) NEGATIVE, 10 (TRACE) mg/dL    Glucose, Urine 150 (2+) (A) NEGATIVE mg/dL    Blood, Urine 1.0 (3+) (A) NEGATIVE    Ketones, Urine NEGATIVE NEGATIVE mg/dL    Bilirubin, Urine 0.5 (1+) (A) NEGATIVE    Urobilinogen, Urine NORM NORM mg/dL    Nitrite, Urine NEGATIVE NEGATIVE    Leukocyte Esterase, Urine 25 Angelia/µL (A) NEGATIVE   Microscopic Only, Urine   Result Value Ref Range    WBC, Urine 6-10 (A) 1-5, NONE /HPF    RBC, Urine >20 (A) NONE, 1-2, 3-5 /HPF    Bacteria, Urine 1+ (A) NONE SEEN /HPF   Blood Culture    Specimen: Blood culture   Result Value Ref Range    Blood Culture Loaded on Instrument - Culture in progress    POCT GLUCOSE   Result Value Ref Range    POCT Glucose 180 (H) 74 - 99 mg/dL   Blood Gas Arterial Full Panel   Result Value Ref Range    POCT pH, Arterial 7.24 (LL) 7.38 - 7.42 pH    POCT pCO2, Arterial 38 38 - 42  mm Hg    POCT pO2, Arterial 101 (H) 85 - 95 mm Hg    POCT SO2, Arterial 97 94 - 100 %    POCT Oxy Hemoglobin, Arterial 95.2 94.0 - 98.0 %    POCT Hematocrit Calculated, Arterial 27.0 (L) 41.0 - 52.0 %    POCT Sodium, Arterial 138 136 - 145 mmol/L    POCT Potassium, Arterial 4.5 3.5 - 5.3 mmol/L    POCT Chloride, Arterial 99 98 - 107 mmol/L    POCT Ionized Calcium, Arterial 1.09 (L) 1.10 - 1.33 mmol/L    POCT Glucose, Arterial 180 (H) 74 - 99 mg/dL    POCT Lactate, Arterial 11.6 (HH) 0.4 - 2.0 mmol/L    POCT Base Excess, Arterial -10.3 (L) -2.0 - 3.0 mmol/L    POCT HCO3 Calculated, Arterial 16.3 (L) 22.0 - 26.0 mmol/L    POCT Hemoglobin, Arterial 8.9 (L) 13.5 - 17.5 g/dL    POCT Anion Gap, Arterial 27 (H) 10 - 25 mmo/L    Patient Temperature 37.0 degrees Celsius    FiO2 100 %   Blood Gas Arterial Full Panel   Result Value Ref Range    POCT pH, Arterial 7.24 (LL) 7.38 - 7.42 pH    POCT pCO2, Arterial 46 (H) 38 - 42 mm Hg    POCT pO2, Arterial 78 (L) 85 - 95 mm Hg    POCT SO2, Arterial 97 94 - 100 %    POCT Oxy Hemoglobin, Arterial 94.4 94.0 - 98.0 %    POCT Hematocrit Calculated, Arterial 27.0 (L) 41.0 - 52.0 %    POCT Sodium, Arterial 137 136 - 145 mmol/L    POCT Potassium, Arterial 4.5 3.5 - 5.3 mmol/L    POCT Chloride, Arterial 99 98 - 107 mmol/L    POCT Ionized Calcium, Arterial 1.05 (L) 1.10 - 1.33 mmol/L    POCT Glucose, Arterial 179 (H) 74 - 99 mg/dL    POCT Lactate, Arterial 10.7 (HH) 0.4 - 2.0 mmol/L    POCT Base Excess, Arterial -7.3 (L) -2.0 - 3.0 mmol/L    POCT HCO3 Calculated, Arterial 19.7 (L) 22.0 - 26.0 mmol/L    POCT Hemoglobin, Arterial 9.1 (L) 13.5 - 17.5 g/dL    POCT Anion Gap, Arterial 23 10 - 25 mmo/L    Patient Temperature 37.0 degrees Celsius    FiO2 100 %   CBC   Result Value Ref Range    WBC 9.0 4.4 - 11.3 x10*3/uL    nRBC 2.8 (H) 0.0 - 0.0 /100 WBCs    RBC 2.73 (L) 4.50 - 5.90 x10*6/uL    Hemoglobin 8.1 (L) 13.5 - 17.5 g/dL    Hematocrit 22.9 (L) 41.0 - 52.0 %    MCV 84 80 - 100 fL    MCH  29.7 26.0 - 34.0 pg    MCHC 35.4 32.0 - 36.0 g/dL    RDW 15.6 (H) 11.5 - 14.5 %    Platelets 92 (L) 150 - 450 x10*3/uL   Coagulation Screen   Result Value Ref Range    Protime 21.4 (H) 9.8 - 12.8 seconds    INR 1.9 (H) 0.9 - 1.1    aPTT 35 27 - 38 seconds   Magnesium   Result Value Ref Range    Magnesium 2.09 1.60 - 2.40 mg/dL   Renal Function Panel   Result Value Ref Range    Glucose 182 (H) 74 - 99 mg/dL    Sodium 140 136 - 145 mmol/L    Potassium 4.5 3.5 - 5.3 mmol/L    Chloride 97 (L) 98 - 107 mmol/L    Bicarbonate 21 21 - 32 mmol/L    Anion Gap 27 (H) 10 - 20 mmol/L    Urea Nitrogen 30 (H) 6 - 23 mg/dL    Creatinine 3.01 (H) 0.50 - 1.30 mg/dL    eGFR 23 (L) >60 mL/min/1.73m*2    Calcium 8.2 (L) 8.6 - 10.6 mg/dL    Phosphorus 6.3 (H) 2.5 - 4.9 mg/dL    Albumin 3.0 (L) 3.4 - 5.0 g/dL   Calcium, ionized   Result Value Ref Range    POCT Calcium, Ionized 1.03 (L) 1.1 - 1.33 mmol/L   Fibrinogen   Result Value Ref Range    Fibrinogen 292 200 - 400 mg/dL   POCT GLUCOSE   Result Value Ref Range    POCT Glucose 179 (H) 74 - 99 mg/dL   Extra Urine Gray Tube   Result Value Ref Range    Extra Tube Hold for add-ons.    Triglycerides   Result Value Ref Range    Triglycerides 595 (H) 0 - 149 mg/dL   POCT GLUCOSE   Result Value Ref Range    POCT Glucose 170 (H) 74 - 99 mg/dL   Blood Culture    Specimen: Peripheral Venipuncture; Blood culture   Result Value Ref Range    Blood Culture Loaded on Instrument - Culture in progress    Lipase   Result Value Ref Range    Lipase 531 (H) 9 - 82 U/L   Amylase   Result Value Ref Range    Amylase 509 (H) 29 - 103 U/L   Blood Gas Arterial Full Panel   Result Value Ref Range    POCT pH, Arterial 7.33 (L) 7.38 - 7.42 pH    POCT pCO2, Arterial 39 38 - 42 mm Hg    POCT pO2, Arterial 90 85 - 95 mm Hg    POCT SO2, Arterial 98 94 - 100 %    POCT Oxy Hemoglobin, Arterial 95.9 94.0 - 98.0 %    POCT Hematocrit Calculated, Arterial 24.0 (L) 41.0 - 52.0 %    POCT Sodium, Arterial 135 (L) 136 - 145 mmol/L     POCT Potassium, Arterial 4.2 3.5 - 5.3 mmol/L    POCT Chloride, Arterial 99 98 - 107 mmol/L    POCT Ionized Calcium, Arterial 1.05 (L) 1.10 - 1.33 mmol/L    POCT Glucose, Arterial 206 (H) 74 - 99 mg/dL    POCT Lactate, Arterial 9.5 (HH) 0.4 - 2.0 mmol/L    POCT Base Excess, Arterial -4.9 (L) -2.0 - 3.0 mmol/L    POCT HCO3 Calculated, Arterial 20.6 (L) 22.0 - 26.0 mmol/L    POCT Hemoglobin, Arterial 8.0 (L) 13.5 - 17.5 g/dL    POCT Anion Gap, Arterial 20 10 - 25 mmo/L    Patient Temperature 37.0 degrees Celsius    FiO2 100 %   Green Top   Result Value Ref Range    Extra Tube Hold for add-ons.    Green Top   Result Value Ref Range    Extra Tube Hold for add-ons.    Green Top   Result Value Ref Range    Extra Tube Hold for add-ons.    POCT GLUCOSE   Result Value Ref Range    POCT Glucose 183 (H) 74 - 99 mg/dL   CBC   Result Value Ref Range    WBC 8.8 4.4 - 11.3 x10*3/uL    nRBC 2.7 (H) 0.0 - 0.0 /100 WBCs    RBC 2.63 (L) 4.50 - 5.90 x10*6/uL    Hemoglobin 8.3 (L) 13.5 - 17.5 g/dL    Hematocrit 22.0 (L) 41.0 - 52.0 %    MCV 84 80 - 100 fL    MCH 31.6 26.0 - 34.0 pg    MCHC 37.7 (H) 32.0 - 36.0 g/dL    RDW 15.4 (H) 11.5 - 14.5 %    Platelets 87 (L) 150 - 450 x10*3/uL   Magnesium   Result Value Ref Range    Magnesium 2.08 1.60 - 2.40 mg/dL   Renal Function Panel   Result Value Ref Range    Glucose 187 (H) 74 - 99 mg/dL    Sodium 137 136 - 145 mmol/L    Potassium 4.1 3.5 - 5.3 mmol/L    Chloride 96 (L) 98 - 107 mmol/L    Bicarbonate 21 21 - 32 mmol/L    Anion Gap 24 (H) 10 - 20 mmol/L    Urea Nitrogen 28 (H) 6 - 23 mg/dL    Creatinine 2.71 (H) 0.50 - 1.30 mg/dL    eGFR 26 (L) >60 mL/min/1.73m*2    Calcium 8.3 (L) 8.6 - 10.6 mg/dL    Phosphorus 4.7 2.5 - 4.9 mg/dL    Albumin 2.9 (L) 3.4 - 5.0 g/dL   POCT GLUCOSE   Result Value Ref Range    POCT Glucose 174 (H) 74 - 99 mg/dL   Prepare RBC: 1 Units   Result Value Ref Range    PRODUCT CODE I3449C27     Unit Number [id number]     Unit ABO O     Unit RH POS     XM INTEP  COMP     Dispense Status IS     Blood Expiration Date April 06, 2024 23:59 EDT     PRODUCT BLOOD TYPE 5100     UNIT VOLUME 350    Coagulation Screen   Result Value Ref Range    Protime 22.3 (H) 9.8 - 12.8 seconds    INR 2.0 (H) 0.9 - 1.1    aPTT 36 27 - 38 seconds   Blood Gas Arterial Full Panel   Result Value Ref Range    POCT pH, Arterial 7.33 (L) 7.38 - 7.42 pH    POCT pCO2, Arterial 38 38 - 42 mm Hg    POCT pO2, Arterial 89 85 - 95 mm Hg    POCT SO2, Arterial 98 94 - 100 %    POCT Oxy Hemoglobin, Arterial 95.6 94.0 - 98.0 %    POCT Hematocrit Calculated, Arterial 25.0 (L) 41.0 - 52.0 %    POCT Sodium, Arterial 135 (L) 136 - 145 mmol/L    POCT Potassium, Arterial 4.1 3.5 - 5.3 mmol/L    POCT Chloride, Arterial 98 98 - 107 mmol/L    POCT Ionized Calcium, Arterial 1.10 1.10 - 1.33 mmol/L    POCT Glucose, Arterial 182 (H) 74 - 99 mg/dL    POCT Lactate, Arterial 8.8 (HH) 0.4 - 2.0 mmol/L    POCT Base Excess, Arterial -5.5 (L) -2.0 - 3.0 mmol/L    POCT HCO3 Calculated, Arterial 20.0 (L) 22.0 - 26.0 mmol/L    POCT Hemoglobin, Arterial 8.2 (L) 13.5 - 17.5 g/dL    POCT Anion Gap, Arterial 21 10 - 25 mmo/L    Patient Temperature 37.0 degrees Celsius    FiO2 90 %   Blood Gas Arterial Full Panel   Result Value Ref Range    POCT pH, Arterial 7.34 (L) 7.38 - 7.42 pH    POCT pCO2, Arterial 37 (L) 38 - 42 mm Hg    POCT pO2, Arterial 85 85 - 95 mm Hg    POCT SO2, Arterial 98 94 - 100 %    POCT Oxy Hemoglobin, Arterial 96.2 94.0 - 98.0 %    POCT Hematocrit Calculated, Arterial 27.0 (L) 41.0 - 52.0 %    POCT Sodium, Arterial 136 136 - 145 mmol/L    POCT Potassium, Arterial 4.1 3.5 - 5.3 mmol/L    POCT Chloride, Arterial 98 98 - 107 mmol/L    POCT Ionized Calcium, Arterial 1.07 (L) 1.10 - 1.33 mmol/L    POCT Glucose, Arterial 171 (H) 74 - 99 mg/dL    POCT Lactate, Arterial 8.8 (HH) 0.4 - 2.0 mmol/L    POCT Base Excess, Arterial -5.3 (L) -2.0 - 3.0 mmol/L    POCT HCO3 Calculated, Arterial 20.0 (L) 22.0 - 26.0 mmol/L    POCT  Hemoglobin, Arterial 9.0 (L) 13.5 - 17.5 g/dL    POCT Anion Gap, Arterial 22 10 - 25 mmo/L    Patient Temperature 37.0 degrees Celsius    FiO2 80 %   Vancomycin   Result Value Ref Range    Vancomycin 21.8 (H) 5.0 - 20.0 ug/mL   Hepatic function panel   Result Value Ref Range    Albumin 2.9 (L) 3.4 - 5.0 g/dL    Bilirubin, Total 4.9 (H) 0.0 - 1.2 mg/dL    Bilirubin, Direct 2.9 (H) 0.0 - 0.3 mg/dL    Alkaline Phosphatase 90 33 - 136 U/L    ALT 1,826 (H) 10 - 52 U/L    AST 2,289 (H) 9 - 39 U/L    Total Protein 4.1 (L) 6.4 - 8.2 g/dL   Blood Gas Arterial Full Panel   Result Value Ref Range    POCT pH, Arterial 7.37 (L) 7.38 - 7.42 pH    POCT pCO2, Arterial 36 (L) 38 - 42 mm Hg    POCT pO2, Arterial 86 85 - 95 mm Hg    POCT SO2, Arterial 98 94 - 100 %    POCT Oxy Hemoglobin, Arterial 95.1 94.0 - 98.0 %    POCT Hematocrit Calculated, Arterial 27.0 (L) 41.0 - 52.0 %    POCT Sodium, Arterial 133 (L) 136 - 145 mmol/L    POCT Potassium, Arterial 4.2 3.5 - 5.3 mmol/L    POCT Chloride, Arterial 98 98 - 107 mmol/L    POCT Ionized Calcium, Arterial 1.07 (L) 1.10 - 1.33 mmol/L    POCT Glucose, Arterial 186 (H) 74 - 99 mg/dL    POCT Lactate, Arterial 8.3 (HH) 0.4 - 2.0 mmol/L    POCT Base Excess, Arterial -4.0 (L) -2.0 - 3.0 mmol/L    POCT HCO3 Calculated, Arterial 20.8 (L) 22.0 - 26.0 mmol/L    POCT Hemoglobin, Arterial 9.0 (L) 13.5 - 17.5 g/dL    POCT Anion Gap, Arterial 18 10 - 25 mmo/L    Patient Temperature 37.0 degrees Celsius    FiO2 80 %   CBC   Result Value Ref Range    WBC 9.6 4.4 - 11.3 x10*3/uL    nRBC 4.1 (H) 0.0 - 0.0 /100 WBCs    RBC 2.87 (L) 4.50 - 5.90 x10*6/uL    Hemoglobin 9.0 (L) 13.5 - 17.5 g/dL    Hematocrit 24.5 (L) 41.0 - 52.0 %    MCV 85 80 - 100 fL    MCH 31.4 26.0 - 34.0 pg    MCHC 36.7 (H) 32.0 - 36.0 g/dL    RDW 15.5 (H) 11.5 - 14.5 %    Platelets 87 (L) 150 - 450 x10*3/uL   Coagulation Screen   Result Value Ref Range    Protime 20.2 (H) 9.8 - 12.8 seconds    INR 1.8 (H) 0.9 - 1.1    aPTT 34 27 - 38  seconds   Magnesium   Result Value Ref Range    Magnesium 2.09 1.60 - 2.40 mg/dL   Calcium, ionized   Result Value Ref Range    POCT Calcium, Ionized 1.05 (L) 1.1 - 1.33 mmol/L   Fibrinogen   Result Value Ref Range    Fibrinogen 285 200 - 400 mg/dL   Basic Metabolic Panel   Result Value Ref Range    Glucose 179 (H) 74 - 99 mg/dL    Sodium 137 136 - 145 mmol/L    Potassium 4.1 3.5 - 5.3 mmol/L    Chloride 96 (L) 98 - 107 mmol/L    Bicarbonate 22 21 - 32 mmol/L    Anion Gap 23 (H) 10 - 20 mmol/L    Urea Nitrogen 27 (H) 6 - 23 mg/dL    Creatinine 2.58 (H) 0.50 - 1.30 mg/dL    eGFR 27 (L) >60 mL/min/1.73m*2    Calcium 8.3 (L) 8.6 - 10.6 mg/dL   Phosphorus   Result Value Ref Range    Phosphorus 3.9 2.5 - 4.9 mg/dL   Blood Gas Arterial Full Panel   Result Value Ref Range    POCT pH, Arterial 7.37 (L) 7.38 - 7.42 pH    POCT pCO2, Arterial 37 (L) 38 - 42 mm Hg    POCT pO2, Arterial 76 (L) 85 - 95 mm Hg    POCT SO2, Arterial 97 94 - 100 %    POCT Oxy Hemoglobin, Arterial 93.7 (L) 94.0 - 98.0 %    POCT Hematocrit Calculated, Arterial 27.0 (L) 41.0 - 52.0 %    POCT Sodium, Arterial 133 (L) 136 - 145 mmol/L    POCT Potassium, Arterial 4.0 3.5 - 5.3 mmol/L    POCT Chloride, Arterial 100 98 - 107 mmol/L    POCT Ionized Calcium, Arterial 1.06 (L) 1.10 - 1.33 mmol/L    POCT Glucose, Arterial 170 (H) 74 - 99 mg/dL    POCT Lactate, Arterial 8.3 (HH) 0.4 - 2.0 mmol/L    POCT Base Excess, Arterial -3.5 (L) -2.0 - 3.0 mmol/L    POCT HCO3 Calculated, Arterial 21.4 (L) 22.0 - 26.0 mmol/L    POCT Hemoglobin, Arterial 8.9 (L) 13.5 - 17.5 g/dL    POCT Anion Gap, Arterial 16 10 - 25 mmo/L    Patient Temperature 37.0 degrees Celsius    FiO2 70 %   POCT GLUCOSE   Result Value Ref Range    POCT Glucose 154 (H) 74 - 99 mg/dL       Transthoracic Echo (TTE) Limited    Result Date: 3/21/2024   [facility], [address] [place] [address]                Tel [phone] and Fax [phone] TRANSTHORACIC ECHOCARDIOGRAM REPORT   Patient Name:      YVETTE LAWSON            Mary Beth Physician:    [id number] Derrell Negron MD Study Date:        3/21/2024            Ordering Provider:    [id number] KELLY OATESRIS MRN/PID:           [id number]             Fellow:               [id number] Alessandro Wise MD Accession#:        [id number]         Nurse: Date of Birth/Age: 1960 / 63 years Sonographer:          Katelyn Aguiar                                                               RDCS, RVT Gender:            M                    Additional Staff: Height:            177.80 cm            Admit Date:           2/28/2024 Weight:            100.70 kg            Admission Status:     Inpatient - STAT BSA / BMI:         2.18 m2 / 31.85      Encounter#:           [id number]                    kg/m2                                         Department Location:  [facility] Blood Pressure: 119 /55 mmHg Study Type:    TRANSTHORACIC ECHO (TTE) LIMITED Diagnosis/ICD: Cardiac arrest, cause unspecified-I46.9; Other pulmonary embolism                without acute cor pulmonale-I26.99 Indication:    Status post cardiac arrest. PE. Right heart strain. CPT Code:      Echo Limited-30708; Color Doppler-15778; Doppler Limited-62850 Patient History: Pertinent History: Myxoid chondrosarcoma. HTN. DM. HLD. Study Detail: The following Echo studies were performed: 2D, M-Mode, Doppler and               color flow. Technically challenging study due to patient lying in               supine position and body habitus. Definity used as a contrast               agent for endocardial border definition.  PHYSICIAN INTERPRETATION: Left Ventricle: The left ventricular systolic function is normal. There are no regional wall motion abnormalities. The left ventricular cavity size is normal. Left ventricular diastolic  filling was not assessed. Left Atrium: The left atrium is normal in size. Right Ventricle: The right ventricle is moderately enlarged. There is low normal right ventricular systolic function. There is a slight suggestion of a Townsend's sign on the apical views suggestive of RV strain although the function is low normal. Right Atrium: The right atrium is mildly dilated. Aortic Valve: The aortic valve is trileaflet. There is trivial aortic valve regurgitation. Mitral Valve: The mitral valve is normal in structure. Mitral valve regurgitation was not assessed. Tricuspid Valve: The tricuspid valve is structurally normal. There is trace tricuspid regurgitation. The right ventricular systolic pressure is unable to be estimated. Pulmonic Valve: The pulmonic valve is not well visualized. The pulmonic valve regurgitation was not well visualized. Pericardium: There is a trivial pericardial effusion. Aorta: The aortic root is normal. Systemic Veins: The inferior vena cava appears to be of normal size. There is IVC inspiratory collapse greater than 50%. In comparison to the previous echocardiogram(s): There are no prior studies on this patient for comparison purposes.  CONCLUSIONS:  1. Left ventricular systolic function is normal.  2. Poorly visualized anatomical structures due to suboptimal image quality.  3. Moderately enlarged right ventricle.  4. There is a slight suggestion of a Townsend's sign on the apical views suggestive of RV strain although the function is low normal.  5. There is low normal right ventricular systolic function. QUANTITATIVE DATA SUMMARY: 2D MEASUREMENTS:                          Normal Ranges: Ao Root d:     3.50 cm   (2.0-3.7cm) LAs:           3.00 cm   (2.7-4.0cm) IVSd:          1.30 cm   (0.6-1.1cm) LVPWd:         1.00 cm   (0.6-1.1cm) LVIDd:         4.80 cm   (3.9-5.9cm) LVIDs:         2.90 cm LV Mass Index: 94.6 g/m2 LV % FS        39.6 % RA VOLUME BY A/L METHOD:                       Normal  Ranges: RA Area A4C: 17.5 cm2 M-MODE MEASUREMENTS:                Normal Ranges: RVIDd: 3.50 cm (0.9-3.6cm) LV SYSTOLIC FUNCTION BY 2D PLANIMETRY (MOD):                     Normal Ranges: EF-A2C View: 59.5 %  RIGHT VENTRICLE: RV Basal 4.80 cm RV Mid   3.70 cm RV Major 8.0 cm TAPSE:   17.0 mm RV s'    0.12 m/s  [id number] Derrell Negron MD Electronically signed on 3/21/2024 at 9:21:52 AM  ** Final **     Electrocardiogram, 12-lead PRN ACS symptoms    Result Date: 3/21/2024  Sinus tachycardia Left axis deviation Right bundle branch block Inferior infarct (cited on or before 20-MAR-2024) Anterolateral infarct (cited on or before 20-MAR-2024) Abnormal ECG When compared with ECG of 20-MAR-2024 18:41, No significant change was found    Electrocardiogram, 12-lead PRN ACS symptoms    Result Date: 3/21/2024  Sinus tachycardia Left axis deviation Right bundle branch block Inferior infarct (cited on or before 20-MAR-2024) Anteroseptal infarct (cited on or before 20-MAR-2024) Abnormal ECG When compared with ECG of 20-MAR-2024 16:21, (unconfirmed) Serial changes of Anteroseptal infarct Present    XR chest 1 view    Result Date: 3/21/2024  Interpreted By:  Nadege Parrish and Afshari Mirak Ga STUDY: XR CHEST 1 VIEW;  3/21/2024 3:16 am   INDICATION: Signs/Symptoms:daily icu.   COMPARISON: Chest x-ray 03/20/2024.   ACCESSION NUMBER(S): [id number]   ORDERING CLINICIAN: NILS LE   FINDINGS: AP radiograph of the chest was provided.   Endotracheal tube tip is approximately 4.2 cm above the ariadna.   An enteric tube courses below diaphragm with the tip beyond the field of view.   CARDIOMEDIASTINAL SILHOUETTE: Cardiomediastinal silhouette is normal in size and configuration.   LUNGS: Similar aeration of bilateral lungs with prominent perihilar interstitial markings. Left costophrenic angle is obscured by overlying cardiomediastinal silhouette. No focal consolidation, sizeable pleural effusion or pneumothorax.   ABDOMEN: No  remarkable upper abdominal findings.   BONES: No acute osseous changes.       1. Persistent pulmonary edema. Left costophrenic angle is obscured which can be due to overlying cardiomediastinal silhouette versus small pleural effusion. No focal consolidation or pneumothorax.   I personally reviewed the images/study and I agree with the findings as stated by resident physician Dr. Ga Noel . This study was interpreted at [place], [place].   MACRO: None   Signed by: Nadege Parrish 3/21/2024 8:19 AM Dictation workstation:   [id number]    XR abdomen 1 view    Result Date: 3/21/2024  Interpreted By:  Nadege Parrish and Ohs Zachary STUDY: XR ABDOMEN 1 VIEW;  3/20/2024 7:15 pm   INDICATION: Signs/Symptoms:enteral tube placement.   COMPARISON: Chest radiograph 03/06/2024.   ACCESSION NUMBER(S): [id number]   ORDERING CLINICIAN: GILSON DUTTON   FINDINGS: Enteric tube with distal tip and side port overlying the expected location of the stomach.   Nonobstructive bowel gas pattern. No evidence of intra-abdominal free air.   Visualized soft tissues and osseous structures are unremarkable.       1. Enteric tube with distal tip and side port overlying the expected location of the stomach. 2. Nonobstructive bowel gas pattern.   I personally reviewed the images/study and I agree with the findings as stated by Dr. Selvin Marcial. This study was interpreted at [place], [place].   MACRO: none   Signed by: Nadege Parrish 3/21/2024 8:19 AM Dictation workstation:   [id number]    XR chest 1 view    Result Date: 3/21/2024  Interpreted By:  Nadege Parrish and Ohs Zachary STUDY: XR CHEST 1 VIEW;  3/20/2024 7:04 pm   INDICATION: Signs/Symptoms:intubation s/p cardiac arrest.   COMPARISON: Chest radiograph 03/06/2024, CT chest 01/31/2024.   ACCESSION NUMBER(S): [id number]   ORDERING CLINICIAN: GILSON DUTTON   FINDINGS: AP radiograph of the  chest was provided.   MEDICAL DEVICES: ETT distal tip 4.2 cm from the ariadna. Enteric tube overlies the expected location of the esophagus and stomach with distal tip not imaged.   CARDIOMEDIASTINAL SILHOUETTE: Heart is similarly enlarged when compared to prior chest radiograph 03/06/2024.   LUNGS: Low lung volumes contribute to bronchovascular crowding. Interval increase in perihilar vascular congestion. Left costophrenic angle is obscured by overlying cardiac silhouette and/or effusion. The right costophrenic angle is clear. No focal consolidation or pneumothorax.   ABDOMEN: No remarkable upper abdominal findings.   BONES: No acute osseous changes.       1. Interval increase in perihilar vascular congestion. 2. Left costophrenic angle is obscured by overlying cardiac silhouette and/or pleural effusion. 3. Medical devices as above.   I personally reviewed the images/study and I agree with the findings as stated by Dr. Selvin Marcial. This study was interpreted at [place], [place].   MACRO: None   Signed by: Nadege Parrish 3/21/2024 8:18 AM Dictation workstation:   [id number]     Scheduled medications  calcium chloride, 1 g, intravenous, Once  hydrocortisone sodium succinate, 50 mg, intravenous, q6h  magnesium sulfate, 2 g, intravenous, Once  micafungin, 100 mg, intravenous, q24h  mupirocin, , Each Nostril, BID  oxygen, , inhalation, Continuous - Inhalation  pantoprazole, 40 mg, intravenous, Daily  piperacillin-tazobactam, 3.375 g, intravenous, q6h  sulfur hexafluoride microsphr, 2 mL, intravenous, Once in imaging  vancomycin, 750 mg, intravenous, q12h      Continuous medications  amiodarone, 1 mg/min, Last Rate: 1 mg/min (03/23/24 0700)  angiotensin II (Giapreza) 2.5 mg in sodium chloride 0.9% 250 mL (0.01 mg/mL) infusion, 1.25-40 ng/kg/min, Last Rate: 40 ng/kg/min (03/23/24 0700)  cisatracurium, 0.5-10 mcg/kg/min (Dosing Weight), Last Rate: 1.7 mcg/kg/min (03/23/24  0700)  EPINEPHrine, 0-2 mcg/kg/min (Dosing Weight), Last Rate: 0.12 mcg/kg/min (03/23/24 0700)  epoprostenol, 0.05 mcg/kg/min (Ideal), Last Rate: 0.05 mcg/kg/min (03/23/24 0400)  fentaNYL,  mcg/hr, Last Rate: 125 mcg/hr (03/23/24 0700)  insulin regular infusion for cardiac surgery, 0-15 Units/hr, Last Rate: 2.5 Units/hr (03/23/24 0700)  lactated Ringer's, 5 mL/hr, Last Rate: 5 mL/hr (03/23/24 0700)  lactated Ringer's, 5 mL/hr, Last Rate: 5 mL/hr (03/23/24 0700)  norepinephrine, 0.1-3 mcg/kg/min (Dosing Weight), Last Rate: 0.5 mcg/kg/min (03/23/24 0700)  PrismaSol BGK 2/3.5, 2,400 mL/hr, Last Rate: 2,400 mL/hr (03/22/24 0907)  propofol, 5-50 mcg/kg/min (Dosing Weight), Last Rate: 50 mcg/kg/min (03/23/24 0700)  vasopressin, 0.01-0.06 Units/min, Last Rate: 0.06 Units/min (03/23/24 0700)      PRN medications  PRN medications: calcium gluconate, calcium gluconate, dextrose, dextrose, dextrose **OR** glucagon, glucagon, HYDROmorphone, insulin regular, magnesium sulfate, magnesium sulfate, potassium chloride, potassium chloride, vancomycin             Assessment/Plan   Principal Problem:    Soft tissue sarcoma (CMS/HCC)  Active Problems:    Extraskeletal myxoid chondrosarcoma (CMS/HCC)    Cardiopulmonary arrest (CMS/HCC)    Assessment:  Jason Hollins is a 63 y.o. 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 Hawkins and Dr. Smith on 3/5/24.  Post operative course was uncomplicated and patient was on RNF until 3/20 for prolonged bed rest to avoid hip flexion to maintain flap integrity.  Patient was on prophylactic lovenox while on bedrest.     3/20: Cardiopulmonary arrest s/p CPR with ROSC after TPA, overnight started on heparin, epo, increased pressor requirements, increased swelling at flap site.     3/21: Hemorrhagic shock,  MTP. Firm and large right flap site. Return to OR s/p Exploration of right thigh wound, Evacuation of hematoma. Suture ligation of multiple bleeding vessel and several points in gluteal area.         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 Hawkins and Dr. Smith on 3/5/24 s/p cardiopulmonary arrest with ROSC 3/20. Documented to have followed commands off sedation. Acute post op pain. CT head 3/22 without acute process. Currently on fentanyl, propofol, nimbex.  - no discontinuation of NMB infusion or SAT today given severe ARDS  - convert propofol to ketamine  - goal BIS 40-60  - goal RASS -5  - PT/OT consult when appropriate     CV: History of HTN, HLD. Acute cardiopulmonary arrest 2/2 to possible massive PE vs massive STEMI, received multiple rounds of CPR and one defibrillation.  Sustained ROSC achieved after TPA bolus. On high dose levophed, epinephrine, vaso, angioT2. Plan on 3/21 was for ECMO which was aborted secondary to large hemhorrge at right flap site. Had MTP and taken OR with 5L evacuated. ECHO 3/21: Normal LV, Mod enlarged RV, slight suggestion of a Townsend's sign / RV strain, low normal RV function. ECHO 3/22 with hyperdynamic LV. New onset Afib with RVR overnight, dosed with 150mg amio x2, started infusion at 1mg/min thereafter. No significant changes to vasoactive med doses overnight. Remains on levo 0.5, epi 0.12, vaso 0.06, AT2 40 this am.  - continuous EKG/abp monitoring  - Goal map range 65-90  - wean pressors as able   - continue SDS  - continue amio 1mg/min  - try to start gentle fluid removal with cvvh today  - continue to trend lactate     PULM: Arrived to SICU intubated julio c-CPR. Concern for massive PE. Started on iEpo, currently on 0.05. Hypoxic respiratory failure. Severe ARDS. Currently  on 70%, PEEP 14  - ARDSnet lung protective strategies  - Wean FiO2 as tolerated.    - keep peep 14  - keep iEpo 0.05  - Frequent ABGs  - additional pulm toilet prn  - daily CXR     GI: NPO. Shock liver, pancreatitis. Elevated TG. Elevated lactate. Consulted ACS for potential bowel ischemia as cause of persistent lactic acidosis. CT imaging 3/22 with evidence of pancreatitis. No acute surgical indication per ACS. RUQ US 3/22 with thickening of GB wall without cholelithiasis.   - NPO, OG LIWS  - PPI  - Trend LFTs, amylase, lipase daily  - no enteral feeds or bowel reg yet       : No history of renal disease. Baseline creatinine 0.6. Anuric RUTH. Elevated CK, downtrending. Metabolic acidosis, total body fluid overload, hyperkalemia. Started on CVVH 3/21, stopped bicarb infusion 3/22.  - consider Marino removal  - CVVH even to slightly negative  - RFP BID and PRN  - Replete electrolytes judiciously  - appreciate Nephrology involvement  - Creatine kinase levels dialy     HEME: Patient was on ppx lovenox for DVT prophylaxis prior to cardiopulmonary arrest. Concern for massive PE patient received bolus of TPA during CPR. Started on heparin infusion overnight given concern for PE. Hemorrhagic shock 3/21, MTP and back to OR s/p Exploration of right thigh woundEvacuation of hematoma. Suture ligation of multiple bleeding vessel and several points in gluteal area. Hematology consulted 3/22 to r/o HLH. Transfused 1 RBC overnight. Thrombocytopenia, coagulapathy  - cbc, coags, fibrinogen bid and prn  - ongoing monitoring for s/s bleeding  - close surveillance of RLE and drains    ENDO: History of DM2. A1c 7.5%. TSH WNL 1/2024  - continue insulin drip, transition off when more stable     ID:  Hypothermic requiring warming. No leukocytosis. On broad antimicrobial therapy. MRSA screen + 2/28/24. Pan cultures sent 3/22.  - temp q4h, wbc daily  - ongoing monitoring for s/s infection  - continue zosyn/vancomycin and mupirocin nasal  ointment    Lines: 3/21  - right radial arterial line  - left femoral trialysis  - left subclavian central line  - PIV  - R fem art/perez sheaths -> remove today     Dispo: ICU. Patient seen and discussed with ICU attending Dr. Rouse.     SICU phone [phone]    Critical care time: 90 minutes    PHILLIP Bernal-CNP

## 2024-03-23 NOTE — PROCEDURES
R femoral venous and arterial sheaths removed at bedside. Venous sheath removed first, pressure held for 10 minutes with hemostasis. Arterial sheath removed afterwards, pressure held for 20 minutes with hemostasis.

## 2024-03-23 NOTE — PROGRESS NOTES
Patient seen, examined on CVVH  No significant filter issues overnight  Pressor requirement continues  Electrolytes, acid base, medications reviewed  Remains with multiple other medical problems  No CVVH script change  Fluid removal per primary team

## 2024-03-24 ENCOUNTER — APPOINTMENT (OUTPATIENT)
Dept: RADIOLOGY | Facility: HOSPITAL | Age: 64
DRG: 003 | End: 2024-03-24
Payer: COMMERCIAL

## 2024-03-24 LAB
ALBUMIN SERPL BCP-MCNC: 3.2 G/DL (ref 3.4–5)
ALBUMIN SERPL BCP-MCNC: 3.6 G/DL (ref 3.4–5)
ALP SERPL-CCNC: 105 U/L (ref 33–136)
ALT SERPL W P-5'-P-CCNC: 1714 U/L (ref 10–52)
ANION GAP BLDA CALCULATED.4IONS-SCNC: 15 MMO/L (ref 10–25)
ANION GAP BLDA CALCULATED.4IONS-SCNC: 15 MMO/L (ref 10–25)
ANION GAP BLDA CALCULATED.4IONS-SCNC: 16 MMO/L (ref 10–25)
ANION GAP BLDA CALCULATED.4IONS-SCNC: 19 MMO/L (ref 10–25)
ANION GAP BLDA CALCULATED.4IONS-SCNC: 20 MMO/L (ref 10–25)
ANION GAP BLDA CALCULATED.4IONS-SCNC: 20 MMO/L (ref 10–25)
ANION GAP BLDA CALCULATED.4IONS-SCNC: ABNORMAL MMOL/L
ANION GAP SERPL CALC-SCNC: 21 MMOL/L (ref 10–20)
ANION GAP SERPL CALC-SCNC: 22 MMOL/L (ref 10–20)
APTT PPP: 50 SECONDS (ref 27–38)
AST SERPL W P-5'-P-CCNC: 1550 U/L (ref 9–39)
BACTERIA SPEC RESP CULT: ABNORMAL
BACTERIA UR CULT: NO GROWTH
BASE EXCESS BLDA CALC-SCNC: -4.4 MMOL/L (ref -2–3)
BASE EXCESS BLDA CALC-SCNC: -5.7 MMOL/L (ref -2–3)
BASE EXCESS BLDA CALC-SCNC: -5.9 MMOL/L (ref -2–3)
BASE EXCESS BLDA CALC-SCNC: -6 MMOL/L (ref -2–3)
BASE EXCESS BLDA CALC-SCNC: -6.6 MMOL/L (ref -2–3)
BASE EXCESS BLDA CALC-SCNC: -6.9 MMOL/L (ref -2–3)
BASE EXCESS BLDA CALC-SCNC: -8.8 MMOL/L (ref -2–3)
BILIRUB DIRECT SERPL-MCNC: 3.5 MG/DL (ref 0–0.3)
BILIRUB SERPL-MCNC: 5.7 MG/DL (ref 0–1.2)
BLOOD EXPIRATION DATE: NORMAL
BLOOD EXPIRATION DATE: NORMAL
BODY TEMPERATURE: 37 DEGREES CELSIUS
BUN SERPL-MCNC: 21 MG/DL (ref 6–23)
BUN SERPL-MCNC: 23 MG/DL (ref 6–23)
CA-I BLD-SCNC: 1.11 MMOL/L (ref 1.1–1.33)
CA-I BLD-SCNC: 1.13 MMOL/L (ref 1.1–1.33)
CA-I BLDA-SCNC: 1.08 MMOL/L (ref 1.1–1.33)
CA-I BLDA-SCNC: 1.1 MMOL/L (ref 1.1–1.33)
CA-I BLDA-SCNC: 1.12 MMOL/L (ref 1.1–1.33)
CA-I BLDA-SCNC: 1.16 MMOL/L (ref 1.1–1.33)
CA-I BLDA-SCNC: 1.17 MMOL/L (ref 1.1–1.33)
CA-I BLDA-SCNC: 1.2 MMOL/L (ref 1.1–1.33)
CA-I BLDA-SCNC: ABNORMAL MMOL/L
CALCIUM SERPL-MCNC: 8.4 MG/DL (ref 8.6–10.6)
CALCIUM SERPL-MCNC: 8.6 MG/DL (ref 8.6–10.6)
CHLORIDE BLDA-SCNC: 96 MMOL/L (ref 98–107)
CHLORIDE BLDA-SCNC: 97 MMOL/L (ref 98–107)
CHLORIDE BLDA-SCNC: 98 MMOL/L (ref 98–107)
CHLORIDE BLDA-SCNC: 99 MMOL/L (ref 98–107)
CHLORIDE BLDA-SCNC: ABNORMAL MMOL/L
CHLORIDE SERPL-SCNC: 96 MMOL/L (ref 98–107)
CHLORIDE SERPL-SCNC: 97 MMOL/L (ref 98–107)
CO2 SERPL-SCNC: 19 MMOL/L (ref 21–32)
CO2 SERPL-SCNC: 21 MMOL/L (ref 21–32)
CREAT SERPL-MCNC: 2.33 MG/DL (ref 0.5–1.3)
CREAT SERPL-MCNC: 2.36 MG/DL (ref 0.5–1.3)
DISPENSE STATUS: NORMAL
DISPENSE STATUS: NORMAL
EGFRCR SERPLBLD CKD-EPI 2021: 30 ML/MIN/1.73M*2
EGFRCR SERPLBLD CKD-EPI 2021: 31 ML/MIN/1.73M*2
ERYTHROCYTE [DISTWIDTH] IN BLOOD BY AUTOMATED COUNT: 15.7 % (ref 11.5–14.5)
ERYTHROCYTE [DISTWIDTH] IN BLOOD BY AUTOMATED COUNT: 16 % (ref 11.5–14.5)
ERYTHROCYTE [DISTWIDTH] IN BLOOD BY AUTOMATED COUNT: 16.2 % (ref 11.5–14.5)
GLUCOSE BLD MANUAL STRIP-MCNC: 145 MG/DL (ref 74–99)
GLUCOSE BLD MANUAL STRIP-MCNC: 151 MG/DL (ref 74–99)
GLUCOSE BLD MANUAL STRIP-MCNC: 155 MG/DL (ref 74–99)
GLUCOSE BLD MANUAL STRIP-MCNC: 158 MG/DL (ref 74–99)
GLUCOSE BLD MANUAL STRIP-MCNC: 177 MG/DL (ref 74–99)
GLUCOSE BLD MANUAL STRIP-MCNC: 178 MG/DL (ref 74–99)
GLUCOSE BLD MANUAL STRIP-MCNC: 185 MG/DL (ref 74–99)
GLUCOSE BLDA-MCNC: 137 MG/DL (ref 74–99)
GLUCOSE BLDA-MCNC: 146 MG/DL (ref 74–99)
GLUCOSE BLDA-MCNC: 171 MG/DL (ref 74–99)
GLUCOSE BLDA-MCNC: 177 MG/DL (ref 74–99)
GLUCOSE BLDA-MCNC: 179 MG/DL (ref 74–99)
GLUCOSE BLDA-MCNC: 185 MG/DL (ref 74–99)
GLUCOSE BLDA-MCNC: 186 MG/DL (ref 74–99)
GLUCOSE SERPL-MCNC: 157 MG/DL (ref 74–99)
GLUCOSE SERPL-MCNC: 190 MG/DL (ref 74–99)
GRAM STN SPEC: ABNORMAL
GRAM STN SPEC: ABNORMAL
HAPTOGLOB SERPL-MCNC: <10 MG/DL (ref 37–246)
HCO3 BLDA-SCNC: 16.6 MMOL/L (ref 22–26)
HCO3 BLDA-SCNC: 19.2 MMOL/L (ref 22–26)
HCO3 BLDA-SCNC: 19.5 MMOL/L (ref 22–26)
HCO3 BLDA-SCNC: 19.6 MMOL/L (ref 22–26)
HCO3 BLDA-SCNC: 19.7 MMOL/L (ref 22–26)
HCO3 BLDA-SCNC: 20.1 MMOL/L (ref 22–26)
HCO3 BLDA-SCNC: 21 MMOL/L (ref 22–26)
HCT VFR BLD AUTO: 22 % (ref 41–52)
HCT VFR BLD AUTO: 22.7 % (ref 41–52)
HCT VFR BLD AUTO: 25.1 % (ref 41–52)
HCT VFR BLD EST: 21 % (ref 41–52)
HCT VFR BLD EST: 24 % (ref 41–52)
HCT VFR BLD EST: 26 % (ref 41–52)
HGB BLD-MCNC: 7.6 G/DL (ref 13.5–17.5)
HGB BLD-MCNC: 8.1 G/DL (ref 13.5–17.5)
HGB BLD-MCNC: 8.2 G/DL (ref 13.5–17.5)
HGB BLDA-MCNC: 6.9 G/DL (ref 13.5–17.5)
HGB BLDA-MCNC: 7.9 G/DL (ref 13.5–17.5)
HGB BLDA-MCNC: 7.9 G/DL (ref 13.5–17.5)
HGB BLDA-MCNC: 8 G/DL (ref 13.5–17.5)
HGB BLDA-MCNC: 8 G/DL (ref 13.5–17.5)
HGB BLDA-MCNC: 8.1 G/DL (ref 13.5–17.5)
HGB BLDA-MCNC: 8.6 G/DL (ref 13.5–17.5)
INHALED O2 CONCENTRATION: 100 %
INHALED O2 CONCENTRATION: 40 %
INHALED O2 CONCENTRATION: 50 %
INR PPP: 1.9 (ref 0.9–1.1)
LACTATE BLDA-SCNC: 5.6 MMOL/L (ref 0.4–2)
LACTATE BLDA-SCNC: 5.7 MMOL/L (ref 0.4–2)
LACTATE BLDA-SCNC: 5.8 MMOL/L (ref 0.4–2)
LACTATE BLDA-SCNC: 5.9 MMOL/L (ref 0.4–2)
LACTATE BLDA-SCNC: 6 MMOL/L (ref 0.4–2)
LACTATE BLDA-SCNC: 6.3 MMOL/L (ref 0.4–2)
LACTATE BLDA-SCNC: 6.4 MMOL/L (ref 0.4–2)
MAGNESIUM SERPL-MCNC: 1.94 MG/DL (ref 1.6–2.4)
MAGNESIUM SERPL-MCNC: 2 MG/DL (ref 1.6–2.4)
MCH RBC QN AUTO: 28.4 PG (ref 26–34)
MCH RBC QN AUTO: 29.5 PG (ref 26–34)
MCH RBC QN AUTO: 30.6 PG (ref 26–34)
MCHC RBC AUTO-ENTMCNC: 32.7 G/DL (ref 32–36)
MCHC RBC AUTO-ENTMCNC: 33.5 G/DL (ref 32–36)
MCHC RBC AUTO-ENTMCNC: 36.8 G/DL (ref 32–36)
MCV RBC AUTO: 83 FL (ref 80–100)
MCV RBC AUTO: 87 FL (ref 80–100)
MCV RBC AUTO: 88 FL (ref 80–100)
NRBC BLD-RTO: 6.4 /100 WBCS (ref 0–0)
NRBC BLD-RTO: 6.9 /100 WBCS (ref 0–0)
NRBC BLD-RTO: 7.1 /100 WBCS (ref 0–0)
OXYHGB MFR BLDA: 84.9 % (ref 94–98)
OXYHGB MFR BLDA: 95.6 % (ref 94–98)
OXYHGB MFR BLDA: 95.8 % (ref 94–98)
OXYHGB MFR BLDA: 95.8 % (ref 94–98)
OXYHGB MFR BLDA: 96 % (ref 94–98)
OXYHGB MFR BLDA: 97.1 % (ref 94–98)
OXYHGB MFR BLDA: 97.1 % (ref 94–98)
PCO2 BLDA: 33 MM HG (ref 38–42)
PCO2 BLDA: 37 MM HG (ref 38–42)
PCO2 BLDA: 38 MM HG (ref 38–42)
PCO2 BLDA: 39 MM HG (ref 38–42)
PCO2 BLDA: 40 MM HG (ref 38–42)
PCO2 BLDA: 40 MM HG (ref 38–42)
PCO2 BLDA: 42 MM HG (ref 38–42)
PH BLDA: 7.28 PH (ref 7.38–7.42)
PH BLDA: 7.29 PH (ref 7.38–7.42)
PH BLDA: 7.31 PH (ref 7.38–7.42)
PH BLDA: 7.31 PH (ref 7.38–7.42)
PH BLDA: 7.32 PH (ref 7.38–7.42)
PH BLDA: 7.33 PH (ref 7.38–7.42)
PH BLDA: 7.34 PH (ref 7.38–7.42)
PHOSPHATE SERPL-MCNC: 3.5 MG/DL (ref 2.5–4.9)
PHOSPHATE SERPL-MCNC: 3.5 MG/DL (ref 2.5–4.9)
PLATELET # BLD AUTO: 73 X10*3/UL (ref 150–450)
PLATELET # BLD AUTO: 77 X10*3/UL (ref 150–450)
PLATELET # BLD AUTO: 80 X10*3/UL (ref 150–450)
PO2 BLDA: 101 MM HG (ref 85–95)
PO2 BLDA: 121 MM HG (ref 85–95)
PO2 BLDA: 126 MM HG (ref 85–95)
PO2 BLDA: 219 MM HG (ref 85–95)
PO2 BLDA: 53 MM HG (ref 85–95)
PO2 BLDA: 86 MM HG (ref 85–95)
PO2 BLDA: 99 MM HG (ref 85–95)
POTASSIUM BLDA-SCNC: 3.5 MMOL/L (ref 3.5–5.3)
POTASSIUM BLDA-SCNC: 3.9 MMOL/L (ref 3.5–5.3)
POTASSIUM BLDA-SCNC: 4.1 MMOL/L (ref 3.5–5.3)
POTASSIUM BLDA-SCNC: 4.3 MMOL/L (ref 3.5–5.3)
POTASSIUM BLDA-SCNC: 4.4 MMOL/L (ref 3.5–5.3)
POTASSIUM SERPL-SCNC: 3.9 MMOL/L (ref 3.5–5.3)
POTASSIUM SERPL-SCNC: 4 MMOL/L (ref 3.5–5.3)
PRODUCT BLOOD TYPE: 5100
PRODUCT BLOOD TYPE: 9500
PRODUCT CODE: NORMAL
PRODUCT CODE: NORMAL
PROT SERPL-MCNC: 4.4 G/DL (ref 6.4–8.2)
PROTHROMBIN TIME: 21 SECONDS (ref 9.8–12.8)
RBC # BLD AUTO: 2.58 X10*6/UL (ref 4.5–5.9)
RBC # BLD AUTO: 2.65 X10*6/UL (ref 4.5–5.9)
RBC # BLD AUTO: 2.89 X10*6/UL (ref 4.5–5.9)
SAO2 % BLDA: 100 % (ref 94–100)
SAO2 % BLDA: 88 % (ref 94–100)
SAO2 % BLDA: 98 % (ref 94–100)
SAO2 % BLDA: 98 % (ref 94–100)
SAO2 % BLDA: 99 % (ref 94–100)
SODIUM BLDA-SCNC: 129 MMOL/L (ref 136–145)
SODIUM BLDA-SCNC: 130 MMOL/L (ref 136–145)
SODIUM BLDA-SCNC: 132 MMOL/L (ref 136–145)
SODIUM BLDA-SCNC: 132 MMOL/L (ref 136–145)
SODIUM BLDA-SCNC: 134 MMOL/L (ref 136–145)
SODIUM SERPL-SCNC: 134 MMOL/L (ref 136–145)
SODIUM SERPL-SCNC: 134 MMOL/L (ref 136–145)
UFH PPP CHRO-ACNC: 0.1 IU/ML
UFH PPP CHRO-ACNC: 0.2 IU/ML
UFH PPP CHRO-ACNC: 0.2 IU/ML
UFH PPP CHRO-ACNC: 0.3 IU/ML
UFH PPP CHRO-ACNC: 0.3 IU/ML
UNIT ABO: NORMAL
UNIT ABO: NORMAL
UNIT NUMBER: NORMAL
UNIT NUMBER: NORMAL
UNIT RH: NORMAL
UNIT RH: NORMAL
UNIT VOLUME: 280
UNIT VOLUME: 350
WBC # BLD AUTO: 13.4 X10*3/UL (ref 4.4–11.3)
WBC # BLD AUTO: 14.4 X10*3/UL (ref 4.4–11.3)
WBC # BLD AUTO: 17.7 X10*3/UL (ref 4.4–11.3)
XM INTEP: NORMAL
XM INTEP: NORMAL

## 2024-03-24 PROCEDURE — 5A1D90Z PERFORMANCE OF URINARY FILTRATION, CONTINUOUS, GREATER THAN 18 HOURS PER DAY: ICD-10-PCS | Performed by: INTERNAL MEDICINE

## 2024-03-24 PROCEDURE — 85610 PROTHROMBIN TIME: CPT | Performed by: NURSE PRACTITIONER

## 2024-03-24 PROCEDURE — 2500000004 HC RX 250 GENERAL PHARMACY W/ HCPCS (ALT 636 FOR OP/ED): Performed by: STUDENT IN AN ORGANIZED HEALTH CARE EDUCATION/TRAINING PROGRAM

## 2024-03-24 PROCEDURE — 84075 ASSAY ALKALINE PHOSPHATASE: CPT | Performed by: STUDENT IN AN ORGANIZED HEALTH CARE EDUCATION/TRAINING PROGRAM

## 2024-03-24 PROCEDURE — P9047 ALBUMIN (HUMAN), 25%, 50ML: HCPCS | Mod: JZ | Performed by: NURSE PRACTITIONER

## 2024-03-24 PROCEDURE — 74018 RADEX ABDOMEN 1 VIEW: CPT

## 2024-03-24 PROCEDURE — 85520 HEPARIN ASSAY: CPT | Performed by: NURSE PRACTITIONER

## 2024-03-24 PROCEDURE — 71045 X-RAY EXAM CHEST 1 VIEW: CPT

## 2024-03-24 PROCEDURE — 94003 VENT MGMT INPAT SUBQ DAY: CPT

## 2024-03-24 PROCEDURE — 71045 X-RAY EXAM CHEST 1 VIEW: CPT | Performed by: STUDENT IN AN ORGANIZED HEALTH CARE EDUCATION/TRAINING PROGRAM

## 2024-03-24 PROCEDURE — 84132 ASSAY OF SERUM POTASSIUM: CPT | Performed by: NURSE PRACTITIONER

## 2024-03-24 PROCEDURE — 83735 ASSAY OF MAGNESIUM: CPT | Performed by: NURSE PRACTITIONER

## 2024-03-24 PROCEDURE — 2500000001 HC RX 250 WO HCPCS SELF ADMINISTERED DRUGS (ALT 637 FOR MEDICARE OP): Performed by: NURSE PRACTITIONER

## 2024-03-24 PROCEDURE — 84100 ASSAY OF PHOSPHORUS: CPT | Performed by: NURSE PRACTITIONER

## 2024-03-24 PROCEDURE — P9040 RBC LEUKOREDUCED IRRADIATED: HCPCS

## 2024-03-24 PROCEDURE — 2500000004 HC RX 250 GENERAL PHARMACY W/ HCPCS (ALT 636 FOR OP/ED): Performed by: NURSE PRACTITIONER

## 2024-03-24 PROCEDURE — 99291 CRITICAL CARE FIRST HOUR: CPT | Performed by: STUDENT IN AN ORGANIZED HEALTH CARE EDUCATION/TRAINING PROGRAM

## 2024-03-24 PROCEDURE — 37799 UNLISTED PX VASCULAR SURGERY: CPT | Performed by: NURSE PRACTITIONER

## 2024-03-24 PROCEDURE — 74018 RADEX ABDOMEN 1 VIEW: CPT | Performed by: STUDENT IN AN ORGANIZED HEALTH CARE EDUCATION/TRAINING PROGRAM

## 2024-03-24 PROCEDURE — 37799 UNLISTED PX VASCULAR SURGERY: CPT

## 2024-03-24 PROCEDURE — 2500000004 HC RX 250 GENERAL PHARMACY W/ HCPCS (ALT 636 FOR OP/ED)

## 2024-03-24 PROCEDURE — 71045 X-RAY EXAM CHEST 1 VIEW: CPT | Performed by: RADIOLOGY

## 2024-03-24 PROCEDURE — 99232 SBSQ HOSP IP/OBS MODERATE 35: CPT

## 2024-03-24 PROCEDURE — 2500000005 HC RX 250 GENERAL PHARMACY W/O HCPCS: Performed by: NURSE PRACTITIONER

## 2024-03-24 PROCEDURE — 90945 DIALYSIS ONE EVALUATION: CPT | Performed by: INTERNAL MEDICINE

## 2024-03-24 PROCEDURE — 2020000001 HC ICU ROOM DAILY

## 2024-03-24 PROCEDURE — C9113 INJ PANTOPRAZOLE SODIUM, VIA: HCPCS | Performed by: STUDENT IN AN ORGANIZED HEALTH CARE EDUCATION/TRAINING PROGRAM

## 2024-03-24 PROCEDURE — C9113 INJ PANTOPRAZOLE SODIUM, VIA: HCPCS | Performed by: NURSE PRACTITIONER

## 2024-03-24 PROCEDURE — 36430 TRANSFUSION BLD/BLD COMPNT: CPT

## 2024-03-24 PROCEDURE — 84132 ASSAY OF SERUM POTASSIUM: CPT

## 2024-03-24 PROCEDURE — 2500000005 HC RX 250 GENERAL PHARMACY W/O HCPCS: Performed by: STUDENT IN AN ORGANIZED HEALTH CARE EDUCATION/TRAINING PROGRAM

## 2024-03-24 PROCEDURE — 99291 CRITICAL CARE FIRST HOUR: CPT | Performed by: NURSE PRACTITIONER

## 2024-03-24 PROCEDURE — 94645 CONT INHLJ TX EACH ADDL HOUR: CPT

## 2024-03-24 PROCEDURE — 82330 ASSAY OF CALCIUM: CPT

## 2024-03-24 PROCEDURE — 85027 COMPLETE CBC AUTOMATED: CPT | Performed by: NURSE PRACTITIONER

## 2024-03-24 PROCEDURE — 74018 RADEX ABDOMEN 1 VIEW: CPT | Performed by: RADIOLOGY

## 2024-03-24 PROCEDURE — 87081 CULTURE SCREEN ONLY: CPT | Performed by: STUDENT IN AN ORGANIZED HEALTH CARE EDUCATION/TRAINING PROGRAM

## 2024-03-24 PROCEDURE — 82947 ASSAY GLUCOSE BLOOD QUANT: CPT

## 2024-03-24 RX ORDER — PANTOPRAZOLE SODIUM 40 MG/10ML
40 INJECTION, POWDER, LYOPHILIZED, FOR SOLUTION INTRAVENOUS 2 TIMES DAILY
Status: DISCONTINUED | OUTPATIENT
Start: 2024-03-24 | End: 2024-03-26

## 2024-03-24 RX ORDER — HEPARIN SODIUM 10000 [USP'U]/100ML
0-4000 INJECTION, SOLUTION INTRAVENOUS CONTINUOUS
Status: DISCONTINUED | OUTPATIENT
Start: 2024-03-24 | End: 2024-03-26

## 2024-03-24 RX ORDER — FENTANYL CITRATE 50 UG/ML
100 INJECTION, SOLUTION INTRAMUSCULAR; INTRAVENOUS ONCE
Status: COMPLETED | OUTPATIENT
Start: 2024-03-24 | End: 2024-03-24

## 2024-03-24 RX ADMIN — Medication 1.4 MG/KG/HR: at 00:33

## 2024-03-24 RX ADMIN — Medication 175 MCG/HR: at 03:08

## 2024-03-24 RX ADMIN — CALCIUM CHLORIDE, MAGNESIUM CHLORIDE, DEXTROSE MONOHYDRATE, LACTIC ACID, SODIUM CHLORIDE, SODIUM BICARBONATE AND POTASSIUM CHLORIDE 2400 ML/HR: 3.68; 3.05; 22; 5.4; 6.46; 3.09; .314 INJECTION INTRAVENOUS at 20:03

## 2024-03-24 RX ADMIN — Medication 125 MCG/HR: at 17:13

## 2024-03-24 RX ADMIN — CALCIUM CHLORIDE, MAGNESIUM CHLORIDE, DEXTROSE MONOHYDRATE, LACTIC ACID, SODIUM CHLORIDE, SODIUM BICARBONATE AND POTASSIUM CHLORIDE 2400 ML/HR: 5.15; 2.03; 22; 5.4; 6.46; 3.09; .157 INJECTION INTRAVENOUS at 00:02

## 2024-03-24 RX ADMIN — Medication 1.1 MG/KG/HR: at 11:55

## 2024-03-24 RX ADMIN — CALCIUM CHLORIDE, MAGNESIUM CHLORIDE, DEXTROSE MONOHYDRATE, LACTIC ACID, SODIUM CHLORIDE, SODIUM BICARBONATE AND POTASSIUM CHLORIDE 2400 ML/HR: 5.15; 2.03; 22; 5.4; 6.46; 3.09; .157 INJECTION INTRAVENOUS at 14:00

## 2024-03-24 RX ADMIN — AMIODARONE HYDROCHLORIDE 1 MG/MIN: 1.8 INJECTION, SOLUTION INTRAVENOUS at 17:37

## 2024-03-24 RX ADMIN — CALCIUM CHLORIDE, MAGNESIUM CHLORIDE, DEXTROSE MONOHYDRATE, LACTIC ACID, SODIUM CHLORIDE, SODIUM BICARBONATE AND POTASSIUM CHLORIDE 2400 ML/HR: 5.15; 2.03; 22; 5.4; 6.46; 3.09; .157 INJECTION INTRAVENOUS at 07:00

## 2024-03-24 RX ADMIN — PANTOPRAZOLE SODIUM 40 MG: 40 INJECTION, POWDER, FOR SOLUTION INTRAVENOUS at 09:09

## 2024-03-24 RX ADMIN — AMIODARONE HYDROCHLORIDE 1 MG/MIN: 1.8 INJECTION, SOLUTION INTRAVENOUS at 10:43

## 2024-03-24 RX ADMIN — MICAFUNGIN SODIUM 100 MG: 100 INJECTION, POWDER, LYOPHILIZED, FOR SOLUTION INTRAVENOUS at 19:20

## 2024-03-24 RX ADMIN — HEPARIN SODIUM 3000 UNITS: 5000 INJECTION INTRAVENOUS; SUBCUTANEOUS at 04:34

## 2024-03-24 RX ADMIN — CALCIUM CHLORIDE, MAGNESIUM CHLORIDE, DEXTROSE MONOHYDRATE, LACTIC ACID, SODIUM CHLORIDE, SODIUM BICARBONATE AND POTASSIUM CHLORIDE 2400 ML/HR: 5.15; 2.03; 22; 5.4; 6.46; 3.09; .157 INJECTION INTRAVENOUS at 00:01

## 2024-03-24 RX ADMIN — CALCIUM CHLORIDE, MAGNESIUM CHLORIDE, DEXTROSE MONOHYDRATE, LACTIC ACID, SODIUM CHLORIDE, SODIUM BICARBONATE AND POTASSIUM CHLORIDE 2400 ML/HR: 3.68; 3.05; 22; 5.4; 6.46; 3.09; .314 INJECTION INTRAVENOUS at 20:05

## 2024-03-24 RX ADMIN — CALCIUM CHLORIDE, MAGNESIUM CHLORIDE, DEXTROSE MONOHYDRATE, LACTIC ACID, SODIUM CHLORIDE, SODIUM BICARBONATE AND POTASSIUM CHLORIDE 2400 ML/HR: 5.15; 2.03; 22; 5.4; 6.46; 3.09; .157 INJECTION INTRAVENOUS at 00:00

## 2024-03-24 RX ADMIN — Medication 175 MCG/HR: at 09:07

## 2024-03-24 RX ADMIN — VANCOMYCIN HYDROCHLORIDE 1000 MG: 1 INJECTION, SOLUTION INTRAVENOUS at 17:59

## 2024-03-24 RX ADMIN — VASOPRESSIN 0.06 UNITS/MIN: 0.2 INJECTION INTRAVENOUS at 17:11

## 2024-03-24 RX ADMIN — ANGIOTENSIN II 25 NG/KG/MIN: 2.5 INJECTION INTRAVENOUS at 22:43

## 2024-03-24 RX ADMIN — VASOPRESSIN 0.06 UNITS/MIN: 0.2 INJECTION INTRAVENOUS at 04:56

## 2024-03-24 RX ADMIN — HYDROCORTISONE SODIUM SUCCINATE 50 MG: 100 INJECTION, POWDER, FOR SOLUTION INTRAMUSCULAR; INTRAVENOUS at 20:03

## 2024-03-24 RX ADMIN — PIPERACILLIN SODIUM AND TAZOBACTAM SODIUM 3.38 G: 3; .375 INJECTION, SOLUTION INTRAVENOUS at 17:27

## 2024-03-24 RX ADMIN — Medication 1.1 MG/KG/HR: at 05:47

## 2024-03-24 RX ADMIN — Medication 0.05 MCG/KG/MIN: at 23:37

## 2024-03-24 RX ADMIN — MAGNESIUM SULFATE HEPTAHYDRATE 2 G: 40 INJECTION, SOLUTION INTRAVENOUS at 15:26

## 2024-03-24 RX ADMIN — HYDROCORTISONE SODIUM SUCCINATE 50 MG: 100 INJECTION, POWDER, FOR SOLUTION INTRAMUSCULAR; INTRAVENOUS at 03:09

## 2024-03-24 RX ADMIN — VANCOMYCIN HYDROCHLORIDE 1000 MG: 1 INJECTION, SOLUTION INTRAVENOUS at 05:38

## 2024-03-24 RX ADMIN — Medication 1 APPLICATION: at 22:00

## 2024-03-24 RX ADMIN — VASOPRESSIN 0.06 UNITS/MIN: 0.2 INJECTION INTRAVENOUS at 10:27

## 2024-03-24 RX ADMIN — CALCIUM CHLORIDE, MAGNESIUM CHLORIDE, DEXTROSE MONOHYDRATE, LACTIC ACID, SODIUM CHLORIDE, SODIUM BICARBONATE AND POTASSIUM CHLORIDE 2400 ML/HR: 5.15; 2.03; 22; 5.4; 6.46; 3.09; .157 INJECTION INTRAVENOUS at 14:03

## 2024-03-24 RX ADMIN — CALCIUM CHLORIDE, MAGNESIUM CHLORIDE, DEXTROSE MONOHYDRATE, LACTIC ACID, SODIUM CHLORIDE, SODIUM BICARBONATE AND POTASSIUM CHLORIDE 2400 ML/HR: 3.68; 3.05; 22; 5.4; 6.46; 3.09; .314 INJECTION INTRAVENOUS at 20:04

## 2024-03-24 RX ADMIN — FENTANYL CITRATE 100 MCG: 50 INJECTION, SOLUTION INTRAMUSCULAR; INTRAVENOUS at 05:45

## 2024-03-24 RX ADMIN — PIPERACILLIN SODIUM AND TAZOBACTAM SODIUM 3.38 G: 3; .375 INJECTION, SOLUTION INTRAVENOUS at 22:43

## 2024-03-24 RX ADMIN — Medication 1 APPLICATION: at 04:00

## 2024-03-24 RX ADMIN — Medication 1 APPLICATION: at 10:00

## 2024-03-24 RX ADMIN — HEPARIN SODIUM 2300 UNITS/HR: 10000 INJECTION, SOLUTION INTRAVENOUS at 18:29

## 2024-03-24 RX ADMIN — HYDROCORTISONE SODIUM SUCCINATE 50 MG: 100 INJECTION, POWDER, FOR SOLUTION INTRAMUSCULAR; INTRAVENOUS at 09:09

## 2024-03-24 RX ADMIN — MUPIROCIN: 20 OINTMENT TOPICAL at 20:03

## 2024-03-24 RX ADMIN — EPINEPHRINE 0.12 MCG/KG/MIN: 1 INJECTION INTRAMUSCULAR; INTRAVENOUS; SUBCUTANEOUS at 06:25

## 2024-03-24 RX ADMIN — PIPERACILLIN SODIUM AND TAZOBACTAM SODIUM 3.38 G: 3; .375 INJECTION, SOLUTION INTRAVENOUS at 05:38

## 2024-03-24 RX ADMIN — SODIUM CHLORIDE 0.3 MCG/KG/MIN: 9 INJECTION, SOLUTION INTRAVENOUS at 03:09

## 2024-03-24 RX ADMIN — ALBUMIN HUMAN 25 G: 0.25 SOLUTION INTRAVENOUS at 10:02

## 2024-03-24 RX ADMIN — AMIODARONE HYDROCHLORIDE 1 MG/MIN: 1.8 INJECTION, SOLUTION INTRAVENOUS at 04:56

## 2024-03-24 RX ADMIN — HEPARIN SODIUM 2000 UNITS/HR: 10000 INJECTION, SOLUTION INTRAVENOUS at 05:38

## 2024-03-24 RX ADMIN — KETAMINE HYDROCHLORIDE 1.1 MG/KG/HR: 10 INJECTION INTRAMUSCULAR; INTRAVENOUS at 17:15

## 2024-03-24 RX ADMIN — CISATRACURIUM BESYLATE 2 MCG/KG/MIN: 200 INJECTION, SOLUTION INTRAVENOUS at 00:14

## 2024-03-24 RX ADMIN — Medication 1 APPLICATION: at 16:00

## 2024-03-24 RX ADMIN — PANTOPRAZOLE SODIUM 40 MG: 40 INJECTION, POWDER, FOR SOLUTION INTRAVENOUS at 22:43

## 2024-03-24 RX ADMIN — SODIUM CHLORIDE 0.18 MCG/KG/MIN: 9 INJECTION, SOLUTION INTRAVENOUS at 22:43

## 2024-03-24 RX ADMIN — PIPERACILLIN SODIUM AND TAZOBACTAM SODIUM 3.38 G: 3; .375 INJECTION, SOLUTION INTRAVENOUS at 10:43

## 2024-03-24 RX ADMIN — MUPIROCIN: 20 OINTMENT TOPICAL at 09:09

## 2024-03-24 RX ADMIN — CALCIUM CHLORIDE, MAGNESIUM CHLORIDE, DEXTROSE MONOHYDRATE, LACTIC ACID, SODIUM CHLORIDE, SODIUM BICARBONATE AND POTASSIUM CHLORIDE 2400 ML/HR: 5.15; 2.03; 22; 5.4; 6.46; 3.09; .157 INJECTION INTRAVENOUS at 14:02

## 2024-03-24 RX ADMIN — VASOPRESSIN 0.06 UNITS/MIN: 0.2 INJECTION INTRAVENOUS at 23:18

## 2024-03-24 RX ADMIN — ANGIOTENSIN II 39.93 NG/KG/MIN: 2.5 INJECTION INTRAVENOUS at 13:47

## 2024-03-24 RX ADMIN — ALBUMIN HUMAN 25 G: 0.25 SOLUTION INTRAVENOUS at 03:09

## 2024-03-24 RX ADMIN — ANGIOTENSIN II 40 NG/KG/MIN: 2.5 INJECTION INTRAVENOUS at 06:10

## 2024-03-24 RX ADMIN — Medication 0.05 MCG/KG/MIN: at 07:20

## 2024-03-24 RX ADMIN — CALCIUM CHLORIDE, MAGNESIUM CHLORIDE, DEXTROSE MONOHYDRATE, LACTIC ACID, SODIUM CHLORIDE, SODIUM BICARBONATE AND POTASSIUM CHLORIDE 2400 ML/HR: 5.15; 2.03; 22; 5.4; 6.46; 3.09; .157 INJECTION INTRAVENOUS at 07:01

## 2024-03-24 RX ADMIN — SODIUM CHLORIDE 0.25 MCG/KG/MIN: 9 INJECTION, SOLUTION INTRAVENOUS at 13:46

## 2024-03-24 RX ADMIN — Medication: at 20:00

## 2024-03-24 RX ADMIN — CALCIUM CHLORIDE, MAGNESIUM CHLORIDE, DEXTROSE MONOHYDRATE, LACTIC ACID, SODIUM CHLORIDE, SODIUM BICARBONATE AND POTASSIUM CHLORIDE 2400 ML/HR: 5.15; 2.03; 22; 5.4; 6.46; 3.09; .157 INJECTION INTRAVENOUS at 06:59

## 2024-03-24 RX ADMIN — HYDROCORTISONE SODIUM SUCCINATE 50 MG: 100 INJECTION, POWDER, FOR SOLUTION INTRAMUSCULAR; INTRAVENOUS at 13:50

## 2024-03-24 RX ADMIN — Medication 2 SPRAY: at 11:09

## 2024-03-24 ASSESSMENT — PAIN - FUNCTIONAL ASSESSMENT
PAIN_FUNCTIONAL_ASSESSMENT: CPOT (CRITICAL CARE PAIN OBSERVATION TOOL)

## 2024-03-24 NOTE — PROGRESS NOTES
"Jason Hollins is a 63 y.o. male on day 19 of admission presenting with Soft tissue sarcoma (CMS/HCC).    Subjective   Overnight able to wean NE to 0.3.       Objective     Physical Exam    Last Recorded Vitals  Blood pressure 122/60, pulse 78, temperature 37 °C (98.6 °F), temperature source Esophageal, resp. rate 24, height 1.778 m (5' 10\"), weight 129 kg (284 lb 2.8 oz), SpO2 96 %.  Intake/Output last 3 Shifts:  I/O last 3 completed shifts:  In: 9238.5 (71.7 mL/kg) [I.V.:8019.5 (62.2 mL/kg); IV Piggyback:1219]  Out: 2073 (16.1 mL/kg) [Urine:15 (0 mL/kg/hr); Emesis/NG output:100; Drains:1065; Other:893]  Weight: 128.9 kg     Relevant Results           This patient currently has cardiac telemetry ordered; if you would like to modify or discontinue the telemetry order, click here to go to the orders activity to modify/discontinue the order.  This patient has a central line   Reason for the central line remaining today? Dialysis/Hemapheresis, Hemodynamic monitoring, and Parenteral medication      This patient is intubated   Reason for patient to remain intubated today? they have continued cardiopulmonary lability/instability and they have inadequate gas-exchange without positive pressure             Assessment/Plan   Principal Problem:    Soft tissue sarcoma (CMS/HCC)  Active Problems:    Extraskeletal myxoid chondrosarcoma (CMS/HCC)    Cardiopulmonary arrest (CMS/HCC)    63 y.o. 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 Hawkins and Dr. Smith on 3/5/24.  Post operative course was uncomplicated and patient was on RNF until 3/20 for prolonged bed rest to avoid hip flexion to maintain flap integrity.  Patient was on prophylactic lovenox while on bedrest. Per nursing report, patient was getting to the side of the bed today to " get up and walk when he had sudden chest heaviness. Pre-cardiac arrest EKG with signs of anterior ischemia. Nursing was called by family because patient went unresponsive.  ACLS was immediately started. ROSC was achieved after 1 round of CPR, however patient quickly decompensated into PEA.  Airway was secured.  Multiple rounds of CPR were completed with intermittent ROSC prior to transport to CTICU.  Patient arrived to CTICU under SICU care with active CPR underway.  Primary concern for STEMI vs massive PE.  Bedside POCUS TTE with biventricular failure and dilated RV.  RV septal bowing. Decided to give TPA to treat massive PE.  After several minutes of CPR patient achieved  sustained ROSC. Subsequently taken to cath lab where angiography illustrated clean coronaries and no significant PE.      3/20: Now s/p CPR with ROSC after TPA, overnight started on heparin, epo, increased pressor requirements, increased swelling at flap site.   3/21: MTP transfusions. Return to OR for exploration of R Flap site. High pressors. On epo.    3/22 Initiated on CVVH for profound metabolic acidosis, repeat ECHO with hyperdynamic LV, normal RV function, NMB for hypoxemic respiratory failure, CT scans with pancreatitis.      I evaluated the patient with an advanced practice provider. I oversaw this patient's care, and I participated actively their clinical course.     Plan:  Neuro: sedated on ketamine/fentanyl; improving respiratory status, maintain deep sedation and stop cisatracurium infusion. Slowly wean sedation in the upcoming days if hemodynamics and respiratory status allow.   Cardiovascular: Undifferentiated shock, remains on significant hemodynamic support epi 0.12, NE 0.3, angiotensin II 40, vaso 0.06; maintain invasive access; continue to support MAP >65mmHg. Repeat ECHO with hyperdynamic LV and relatively normal RV function. No pericardial effusion, no noted PE on angiogram, no PTX, unlikely obstructive. Central venous  saturation of 80%. Unlikely cardiogenic. Continue stress dose steroids for possible adrenal insufficiency. Hemorrhage currently controlled. Low concern for HLH from hematology consult.   Respiratory: Acute hypoxemic respiratory failure, likely multifactorial in the setting of hypervolemia. Off NMB this morning with FiO2 down to 40%, PEEP 14.   GI: NPO; PPI. Pressor requirements too high to consider enteral nutrition. Persistent lactic acidosis. Elevated INR, elevated AST/ALT, hyperglycemia- some values consistent with liver injury. RUQ ultrasound relatively benign. CT head, chest, abdomen, and pelvis illustrated pancreatitis.   : Oliguric/anuric overnight- remove daniels; started on RRT for persistent metabolic acidosis. Improving.   Endo: Hx DM2. Continue insulin infusion per protocol.   Heme: acute blood loss anemia from surgical limb currently controlled; concern for recent PE (DVT scans ordered and pending), now in atrial fibrillation. Continue low intensity heparin infusion without bolus dosing and monitor for bleeding.   ID: continue vancomycin/zosyn/jonh for broad coverage. Mupirocin x 5 days for MRSA swab positive.   Dispo: SICU Care; family members and primary surgeon updated. Will consult palliative care Monday.      Due to the high probability of life threatening clinical decompensation, the patient required critical care time evaluating and managing this patient.  Critical care time included obtaining a history, examining the patient, ordering and reviewing studies, discussing, developing, and implementing a management plan, evaluating the patient's response to treatment, and discussion with other care team providers. I saw and evaluated the patient myself. I reviewed the provider's documentation and discussed the patient with the provider. Critical care time was performed exclusive of billable procedures.     Critical Care Time: 52 minutes       I spent 52 minutes in the professional and overall care of  this patient.      Yamilex Rouse MD

## 2024-03-24 NOTE — PROCEDURES
Patient seen, examined on CVVH  No significant filter issues overnight  Pressor requirement continues  Electrolytes, acid base, medications reviewed  Remains with multiple other medical problems  Continue CVVH  Fluid removal per primary team

## 2024-03-24 NOTE — PROGRESS NOTES
"Jason Hollins is a 63 y.o. male on day 19 of admission presenting with Soft tissue sarcoma (CMS/HCC).    Subjective   Able to wean levo to 0.3, FiO2 to 50%  Desaturation and hypotension with turn, got bolused with levo  Nimbex off  Weaned off propofol       Objective     Physical Exam  Vitals reviewed.   Constitutional:       Interventions: He is sedated, intubated and restrained.   HENT:      Head:      Comments: Edematous face/head/neck     Mouth/Throat:      Mouth: Mucous membranes are moist.      Comments: Orotracheal intubation  Eyes:      Comments: Pupils pinpoint, equal   Cardiovascular:      Rate and Rhythm: Tachycardia present. Rhythm irregularly irregular.      Pulses:           Radial pulses are 1+ on the right side and 1+ on the left side.        Dorsalis pedis pulses are 1+ on the right side and 1+ on the left side.   Pulmonary:      Effort: He is intubated.      Breath sounds: Rhonchi present.      Comments: Mechanically ventilated via ETT. Scattered rhonchi, diminished breath sounds bilaterally. Equal chest rise.  Abdominal:      General: Abdomen is protuberant. Bowel sounds are absent. There is distension.      Palpations: Abdomen is soft.   Genitourinary:     Comments: Marino present, minimal urine  Skin:     General: Skin is dry.      Comments: Right flap site with wound VAC, no drainage. ANDERSON x 3 all with dark serosanguinous drainage   Neurological:      Mental Status: He is unresponsive.      GCS: GCS eye subscore is 1. GCS verbal subscore is 1. GCS motor subscore is 1.           Last Recorded Vitals  Blood pressure 122/60, pulse 110, temperature 36.6 °C (97.9 °F), temperature source Bladder, resp. rate 24, height 1.778 m (5' 10\"), weight 129 kg (284 lb 2.8 oz), SpO2 97 %.    VS over last 24h  Heart Rate:  [101-127]   Temp:  [35.9 °C (96.6 °F)-36.6 °C (97.9 °F)]   Resp:  [16-28]   SpO2:  [86 %-100 %]    Intake/Output last 3 Shifts:  I/O last 3 completed shifts:  In: 9238.5 (71.7 mL/kg) [I.V.:8019.5 " (62.2 mL/kg); IV Piggyback:1219]  Out: 2073 (16.1 mL/kg) [Urine:15 (0 mL/kg/hr); Emesis/NG output:100; Drains:1065; Other:893]  Weight: 128.9 kg       Intake/Output Summary (Last 24 hours) at 3/24/2024 0759  Last data filed at 3/24/2024 0700  Gross per 24 hour   Intake 6093.44 ml   Output 1851 ml   Net 4242.44 ml          Relevant Results  Results for orders placed or performed during the hospital encounter of 03/05/24 (from the past 24 hour(s))   POCT GLUCOSE   Result Value Ref Range    POCT Glucose 153 (H) 74 - 99 mg/dL   Blood Gas Arterial Full Panel   Result Value Ref Range    POCT pH, Arterial 7.37 (L) 7.38 - 7.42 pH    POCT pCO2, Arterial 35 (L) 38 - 42 mm Hg    POCT pO2, Arterial 79 (L) 85 - 95 mm Hg    POCT SO2, Arterial 98 94 - 100 %    POCT Oxy Hemoglobin, Arterial 96.3 94.0 - 98.0 %    POCT Hematocrit Calculated, Arterial 26.0 (L) 41.0 - 52.0 %    POCT Sodium, Arterial 133 (L) 136 - 145 mmol/L    POCT Potassium, Arterial 4.0 3.5 - 5.3 mmol/L    POCT Chloride, Arterial 99 98 - 107 mmol/L    POCT Ionized Calcium, Arterial 1.09 (L) 1.10 - 1.33 mmol/L    POCT Glucose, Arterial 155 (H) 74 - 99 mg/dL    POCT Lactate, Arterial 7.3 (HH) 0.4 - 2.0 mmol/L    POCT Base Excess, Arterial -4.6 (L) -2.0 - 3.0 mmol/L    POCT HCO3 Calculated, Arterial 20.2 (L) 22.0 - 26.0 mmol/L    POCT Hemoglobin, Arterial 8.6 (L) 13.5 - 17.5 g/dL    POCT Anion Gap, Arterial 18 10 - 25 mmo/L    Patient Temperature 37.0 degrees Celsius    FiO2 60 %   Vancomycin, Trough   Result Value Ref Range    Vancomycin, Trough 13.9 5.0 - 20.0 ug/mL   Blood Gas Arterial Full Panel   Result Value Ref Range    POCT pH, Arterial 7.39 7.38 - 7.42 pH    POCT pCO2, Arterial 33 (L) 38 - 42 mm Hg    POCT pO2, Arterial 82 (L) 85 - 95 mm Hg    POCT SO2, Arterial 98 94 - 100 %    POCT Oxy Hemoglobin, Arterial 96.1 94.0 - 98.0 %    POCT Hematocrit Calculated, Arterial 31.0 (L) 41.0 - 52.0 %    POCT Sodium, Arterial 136 136 - 145 mmol/L    POCT Potassium, Arterial  4.6 3.5 - 5.3 mmol/L    POCT Chloride, Arterial 101 98 - 107 mmol/L    POCT Ionized Calcium, Arterial 1.07 (L) 1.10 - 1.33 mmol/L    POCT Glucose, Arterial 168 (H) 74 - 99 mg/dL    POCT Lactate, Arterial 7.6 (HH) 0.4 - 2.0 mmol/L    POCT Base Excess, Arterial -4.3 (L) -2.0 - 3.0 mmol/L    POCT HCO3 Calculated, Arterial 20.0 (L) 22.0 - 26.0 mmol/L    POCT Hemoglobin, Arterial 10.4 (L) 13.5 - 17.5 g/dL    POCT Anion Gap, Arterial 20 10 - 25 mmo/L    Patient Temperature 37.0 degrees Celsius    FiO2 60 %   CBC   Result Value Ref Range    WBC 11.8 (H) 4.4 - 11.3 x10*3/uL    nRBC 5.7 (H) 0.0 - 0.0 /100 WBCs    RBC 2.91 (L) 4.50 - 5.90 x10*6/uL    Hemoglobin 9.2 (L) 13.5 - 17.5 g/dL    Hematocrit 25.2 (L) 41.0 - 52.0 %    MCV 87 80 - 100 fL    MCH 31.6 26.0 - 34.0 pg    MCHC 36.5 (H) 32.0 - 36.0 g/dL    RDW 15.8 (H) 11.5 - 14.5 %    Platelets 90 (L) 150 - 450 x10*3/uL   Magnesium   Result Value Ref Range    Magnesium 2.13 1.60 - 2.40 mg/dL   Renal Function Panel   Result Value Ref Range    Glucose 152 (H) 74 - 99 mg/dL    Sodium 136 136 - 145 mmol/L    Potassium 4.1 3.5 - 5.3 mmol/L    Chloride 97 (L) 98 - 107 mmol/L    Bicarbonate 20 (L) 21 - 32 mmol/L    Anion Gap 23 (H) 10 - 20 mmol/L    Urea Nitrogen 24 (H) 6 - 23 mg/dL    Creatinine 2.34 (H) 0.50 - 1.30 mg/dL    eGFR 31 (L) >60 mL/min/1.73m*2    Calcium 8.2 (L) 8.6 - 10.6 mg/dL    Phosphorus 3.6 2.5 - 4.9 mg/dL    Albumin 2.8 (L) 3.4 - 5.0 g/dL   Calcium, ionized   Result Value Ref Range    POCT Calcium, Ionized 1.10 1.1 - 1.33 mmol/L   POCT GLUCOSE   Result Value Ref Range    POCT Glucose 153 (H) 74 - 99 mg/dL   Blood Gas Arterial Full Panel   Result Value Ref Range    POCT pH, Arterial 7.31 (L) 7.38 - 7.42 pH    POCT pCO2, Arterial 40 38 - 42 mm Hg    POCT pO2, Arterial 101 (H) 85 - 95 mm Hg    POCT SO2, Arterial 98 94 - 100 %    POCT Oxy Hemoglobin, Arterial 96.4 94.0 - 98.0 %    POCT Hematocrit Calculated, Arterial 26.0 (L) 41.0 - 52.0 %    POCT Sodium, Arterial  132 (L) 136 - 145 mmol/L    POCT Potassium, Arterial 4.1 3.5 - 5.3 mmol/L    POCT Chloride, Arterial 98 98 - 107 mmol/L    POCT Ionized Calcium, Arterial 1.10 1.10 - 1.33 mmol/L    POCT Glucose, Arterial 178 (H) 74 - 99 mg/dL    POCT Lactate, Arterial 6.7 (HH) 0.4 - 2.0 mmol/L    POCT Base Excess, Arterial -5.7 (L) -2.0 - 3.0 mmol/L    POCT HCO3 Calculated, Arterial 20.1 (L) 22.0 - 26.0 mmol/L    POCT Hemoglobin, Arterial 8.5 (L) 13.5 - 17.5 g/dL    POCT Anion Gap, Arterial 18 10 - 25 mmo/L    Patient Temperature 37.0 degrees Celsius    FiO2 60 %   POCT GLUCOSE   Result Value Ref Range    POCT Glucose 161 (H) 74 - 99 mg/dL   POCT GLUCOSE   Result Value Ref Range    POCT Glucose 185 (H) 74 - 99 mg/dL   Heparin Assay, UFH   Result Value Ref Range    Heparin Unfractionated 0.1 See Comment Below for Therapeutic Ranges IU/mL   Blood Gas Arterial Full Panel   Result Value Ref Range    POCT pH, Arterial 7.33 (L) 7.38 - 7.42 pH    POCT pCO2, Arterial 38 38 - 42 mm Hg    POCT pO2, Arterial 92 85 - 95 mm Hg    POCT SO2, Arterial 98 94 - 100 %    POCT Oxy Hemoglobin, Arterial 96.3 94.0 - 98.0 %    POCT Hematocrit Calculated, Arterial 24.0 (L) 41.0 - 52.0 %    POCT Sodium, Arterial 132 (L) 136 - 145 mmol/L    POCT Potassium, Arterial 4.2 3.5 - 5.3 mmol/L    POCT Chloride, Arterial 97 (L) 98 - 107 mmol/L    POCT Ionized Calcium, Arterial 1.15 1.10 - 1.33 mmol/L    POCT Glucose, Arterial 182 (H) 74 - 99 mg/dL    POCT Lactate, Arterial 6.1 (HH) 0.4 - 2.0 mmol/L    POCT Base Excess, Arterial -5.5 (L) -2.0 - 3.0 mmol/L    POCT HCO3 Calculated, Arterial 20.0 (L) 22.0 - 26.0 mmol/L    POCT Hemoglobin, Arterial 8.1 (L) 13.5 - 17.5 g/dL    POCT Anion Gap, Arterial 19 10 - 25 mmo/L    Patient Temperature 37.0 degrees Celsius    FiO2 50 %   Hepatic function panel   Result Value Ref Range    Albumin 3.2 (L) 3.4 - 5.0 g/dL    Bilirubin, Total 5.7 (H) 0.0 - 1.2 mg/dL    Bilirubin, Direct 3.5 (H) 0.0 - 0.3 mg/dL    Alkaline Phosphatase 105 33  - 136 U/L    ALT 1,714 (H) 10 - 52 U/L    AST 1,550 (H) 9 - 39 U/L    Total Protein 4.4 (L) 6.4 - 8.2 g/dL   Blood Gas Arterial Full Panel   Result Value Ref Range    POCT pH, Arterial 7.34 (L) 7.38 - 7.42 pH    POCT pCO2, Arterial 39 38 - 42 mm Hg    POCT pO2, Arterial 101 (H) 85 - 95 mm Hg    POCT SO2, Arterial 99 94 - 100 %    POCT Oxy Hemoglobin, Arterial 95.8 94.0 - 98.0 %    POCT Hematocrit Calculated, Arterial 24.0 (L) 41.0 - 52.0 %    POCT Sodium, Arterial 130 (L) 136 - 145 mmol/L    POCT Potassium, Arterial 4.1 3.5 - 5.3 mmol/L    POCT Chloride, Arterial 97 (L) 98 - 107 mmol/L    POCT Ionized Calcium, Arterial      POCT Glucose, Arterial 179 (H) 74 - 99 mg/dL    POCT Lactate, Arterial 5.6 (HH) 0.4 - 2.0 mmol/L    POCT Base Excess, Arterial -4.4 (L) -2.0 - 3.0 mmol/L    POCT HCO3 Calculated, Arterial 21.0 (L) 22.0 - 26.0 mmol/L    POCT Hemoglobin, Arterial 8.0 (L) 13.5 - 17.5 g/dL    POCT Anion Gap, Arterial 16 10 - 25 mmo/L    Patient Temperature 37.0 degrees Celsius    FiO2 50 %   CBC   Result Value Ref Range    WBC 13.4 (H) 4.4 - 11.3 x10*3/uL    nRBC 6.4 (H) 0.0 - 0.0 /100 WBCs    RBC 2.65 (L) 4.50 - 5.90 x10*6/uL    Hemoglobin 8.1 (L) 13.5 - 17.5 g/dL    Hematocrit 22.0 (L) 41.0 - 52.0 %    MCV 83 80 - 100 fL    MCH 30.6 26.0 - 34.0 pg    MCHC 36.8 (H) 32.0 - 36.0 g/dL    RDW 15.7 (H) 11.5 - 14.5 %    Platelets 73 (L) 150 - 450 x10*3/uL   Coagulation Screen   Result Value Ref Range    Protime 21.0 (H) 9.8 - 12.8 seconds    INR 1.9 (H) 0.9 - 1.1    aPTT 50 (H) 27 - 38 seconds   Magnesium   Result Value Ref Range    Magnesium 2.00 1.60 - 2.40 mg/dL   Calcium, ionized   Result Value Ref Range    POCT Calcium, Ionized 1.11 1.1 - 1.33 mmol/L   Heparin Assay, UFH   Result Value Ref Range    Heparin Unfractionated 0.1 See Comment Below for Therapeutic Ranges IU/mL   Basic Metabolic Panel   Result Value Ref Range    Glucose 190 (H) 74 - 99 mg/dL    Sodium 134 (L) 136 - 145 mmol/L    Potassium 4.0 3.5 - 5.3  mmol/L    Chloride 96 (L) 98 - 107 mmol/L    Bicarbonate 21 21 - 32 mmol/L    Anion Gap 21 (H) 10 - 20 mmol/L    Urea Nitrogen 23 6 - 23 mg/dL    Creatinine 2.33 (H) 0.50 - 1.30 mg/dL    eGFR 31 (L) >60 mL/min/1.73m*2    Calcium 8.4 (L) 8.6 - 10.6 mg/dL   Phosphorus   Result Value Ref Range    Phosphorus 3.5 2.5 - 4.9 mg/dL   Blood Gas Arterial Full Panel   Result Value Ref Range    POCT pH, Arterial 7.33 (L) 7.38 - 7.42 pH    POCT pCO2, Arterial 37 (L) 38 - 42 mm Hg    POCT pO2, Arterial 86 85 - 95 mm Hg    POCT SO2, Arterial 98 94 - 100 %    POCT Oxy Hemoglobin, Arterial 95.6 94.0 - 98.0 %    POCT Hematocrit Calculated, Arterial 24.0 (L) 41.0 - 52.0 %    POCT Sodium, Arterial 132 (L) 136 - 145 mmol/L    POCT Potassium, Arterial 4.1 3.5 - 5.3 mmol/L    POCT Chloride, Arterial 97 (L) 98 - 107 mmol/L    POCT Ionized Calcium, Arterial 1.17 1.10 - 1.33 mmol/L    POCT Glucose, Arterial 185 (H) 74 - 99 mg/dL    POCT Lactate, Arterial 5.9 (HH) 0.4 - 2.0 mmol/L    POCT Base Excess, Arterial -5.9 (L) -2.0 - 3.0 mmol/L    POCT HCO3 Calculated, Arterial 19.5 (L) 22.0 - 26.0 mmol/L    POCT Hemoglobin, Arterial 7.9 (L) 13.5 - 17.5 g/dL    POCT Anion Gap, Arterial 20 10 - 25 mmo/L    Patient Temperature 37.0 degrees Celsius    FiO2 40 %   Heparin Assay, UFH   Result Value Ref Range    Heparin Unfractionated 0.2 See Comment Below for Therapeutic Ranges IU/mL   Blood Gas Arterial Full Panel Unsolicited   Result Value Ref Range    POCT pH, Arterial 7.28 (L) 7.38 - 7.42 pH    POCT pCO2, Arterial 42 38 - 42 mm Hg    POCT pO2, Arterial 53 (L) 85 - 95 mm Hg    POCT SO2, Arterial 88 (L) 94 - 100 %    POCT Oxy Hemoglobin, Arterial 84.9 (L) 94.0 - 98.0 %    POCT Hematocrit Calculated, Arterial 24.0 (L) 41.0 - 52.0 %    POCT Sodium, Arterial 132 (L) 136 - 145 mmol/L    POCT Potassium, Arterial 4.3 3.5 - 5.3 mmol/L    POCT Chloride, Arterial 97 (L) 98 - 107 mmol/L    POCT Ionized Calcium, Arterial 1.16 1.10 - 1.33 mmol/L    POCT Glucose,  Arterial 177 (H) 74 - 99 mg/dL    POCT Lactate, Arterial 6.3 (HH) 0.4 - 2.0 mmol/L    POCT Base Excess, Arterial -6.6 (L) -2.0 - 3.0 mmol/L    POCT HCO3 Calculated, Arterial 19.7 (L) 22.0 - 26.0 mmol/L    POCT Hemoglobin, Arterial 8.0 (L) 13.5 - 17.5 g/dL    POCT Anion Gap, Arterial 20 10 - 25 mmo/L    Patient Temperature 37.0 degrees Celsius   Blood Gas Arterial Full Panel   Result Value Ref Range    POCT pH, Arterial 7.31 (L) 7.38 - 7.42 pH    POCT pCO2, Arterial 40 38 - 42 mm Hg    POCT pO2, Arterial 219 (H) 85 - 95 mm Hg    POCT SO2, Arterial 100 94 - 100 %    POCT Oxy Hemoglobin, Arterial 97.1 94.0 - 98.0 %    POCT Hematocrit Calculated, Arterial 24.0 (L) 41.0 - 52.0 %    POCT Sodium, Arterial 129 (L) 136 - 145 mmol/L    POCT Potassium, Arterial 4.1 3.5 - 5.3 mmol/L    POCT Chloride, Arterial 98 98 - 107 mmol/L    POCT Ionized Calcium, Arterial 1.12 1.10 - 1.33 mmol/L    POCT Glucose, Arterial 186 (H) 74 - 99 mg/dL    POCT Lactate, Arterial 6.4 (HH) 0.4 - 2.0 mmol/L    POCT Base Excess, Arterial -5.7 (L) -2.0 - 3.0 mmol/L    POCT HCO3 Calculated, Arterial 20.1 (L) 22.0 - 26.0 mmol/L    POCT Hemoglobin, Arterial 8.1 (L) 13.5 - 17.5 g/dL    POCT Anion Gap, Arterial 15 10 - 25 mmo/L    Patient Temperature 37.0 degrees Celsius    FiO2 100 %       Transthoracic Echo (TTE) Limited    Result Date: 3/21/2024   [facility], [address] [place] [address]                Tel [phone] and Fax [phone] TRANSTHORACIC ECHOCARDIOGRAM REPORT  Patient Name:      YVETTE Clinton Physician:    [id number] Derrell Negron MD Study Date:        3/21/2024            Ordering Provider:    [id number] KELLY ELISE MRN/PID:           [id number]             Fellow:               [id number] Alessandro Wise MD Accession#:         [id number]         Nurse: Date of Birth/Age: 1960 / 63 years Sonographer:          Katelyn Stefania                                                               RDCS, RVT Gender:            M                    Additional Staff: Height:            177.80 cm            Admit Date:           2/28/2024 Weight:            100.70 kg            Admission Status:     Inpatient - STAT BSA / BMI:         2.18 m2 / 31.85      Encounter#:           [id number]                    kg/m2                                         Department Location:  [facility] Blood Pressure: 119 /55 mmHg Study Type:    TRANSTHORACIC ECHO (TTE) LIMITED Diagnosis/ICD: Cardiac arrest, cause unspecified-I46.9; Other pulmonary embolism                without acute cor pulmonale-I26.99 Indication:    Status post cardiac arrest. PE. Right heart strain. CPT Code:      Echo Limited-01949; Color Doppler-67190; Doppler Limited-34371 Patient History: Pertinent History: Myxoid chondrosarcoma. HTN. DM. HLD. Study Detail: The following Echo studies were performed: 2D, M-Mode, Doppler and               color flow. Technically challenging study due to patient lying in               supine position and body habitus. Definity used as a contrast               agent for endocardial border definition.  PHYSICIAN INTERPRETATION: Left Ventricle: The left ventricular systolic function is normal. There are no regional wall motion abnormalities. The left ventricular cavity size is normal. Left ventricular diastolic filling was not assessed. Left Atrium: The left atrium is normal in size. Right Ventricle: The right ventricle is moderately enlarged. There is low normal right ventricular systolic function. There is a slight suggestion of a Townsend's sign on the apical views suggestive of RV strain although the function is low normal. Right Atrium: The right atrium is mildly dilated. Aortic Valve: The aortic valve is trileaflet. There is trivial aortic valve  regurgitation. Mitral Valve: The mitral valve is normal in structure. Mitral valve regurgitation was not assessed. Tricuspid Valve: The tricuspid valve is structurally normal. There is trace tricuspid regurgitation. The right ventricular systolic pressure is unable to be estimated. Pulmonic Valve: The pulmonic valve is not well visualized. The pulmonic valve regurgitation was not well visualized. Pericardium: There is a trivial pericardial effusion. Aorta: The aortic root is normal. Systemic Veins: The inferior vena cava appears to be of normal size. There is IVC inspiratory collapse greater than 50%. In comparison to the previous echocardiogram(s): There are no prior studies on this patient for comparison purposes.  CONCLUSIONS:  1. Left ventricular systolic function is normal.  2. Poorly visualized anatomical structures due to suboptimal image quality.  3. Moderately enlarged right ventricle.  4. There is a slight suggestion of a Townsend's sign on the apical views suggestive of RV strain although the function is low normal.  5. There is low normal right ventricular systolic function. QUANTITATIVE DATA SUMMARY: 2D MEASUREMENTS:                          Normal Ranges: Ao Root d:     3.50 cm   (2.0-3.7cm) LAs:           3.00 cm   (2.7-4.0cm) IVSd:          1.30 cm   (0.6-1.1cm) LVPWd:         1.00 cm   (0.6-1.1cm) LVIDd:         4.80 cm   (3.9-5.9cm) LVIDs:         2.90 cm LV Mass Index: 94.6 g/m2 LV % FS        39.6 % RA VOLUME BY A/L METHOD:                       Normal Ranges: RA Area A4C: 17.5 cm2 M-MODE MEASUREMENTS:                Normal Ranges: RVIDd: 3.50 cm (0.9-3.6cm) LV SYSTOLIC FUNCTION BY 2D PLANIMETRY (MOD):                     Normal Ranges: EF-A2C View: 59.5 %  RIGHT VENTRICLE: RV Basal 4.80 cm RV Mid   3.70 cm RV Major 8.0 cm TAPSE:   17.0 mm RV s'    0.12 m/s  [id number] Derrell Negron MD Electronically signed on 3/21/2024 at 9:21:52 AM  ** Final **     Electrocardiogram, 12-lead PRN ACS  symptoms    Result Date: 3/21/2024  Sinus tachycardia Left axis deviation Right bundle branch block Inferior infarct (cited on or before 20-MAR-2024) Anterolateral infarct (cited on or before 20-MAR-2024) Abnormal ECG When compared with ECG of 20-MAR-2024 18:41, No significant change was found    Electrocardiogram, 12-lead PRN ACS symptoms    Result Date: 3/21/2024  Sinus tachycardia Left axis deviation Right bundle branch block Inferior infarct (cited on or before 20-MAR-2024) Anteroseptal infarct (cited on or before 20-MAR-2024) Abnormal ECG When compared with ECG of 20-MAR-2024 16:21, (unconfirmed) Serial changes of Anteroseptal infarct Present    XR chest 1 view    Result Date: 3/21/2024  Interpreted By:  Nadege Parrish and Afshari Mirak Sohrab STUDY: XR CHEST 1 VIEW;  3/21/2024 3:16 am   INDICATION: Signs/Symptoms:daily icu.   COMPARISON: Chest x-ray 03/20/2024.   ACCESSION NUMBER(S): [id number]   ORDERING CLINICIAN: NILS LE   FINDINGS: AP radiograph of the chest was provided.   Endotracheal tube tip is approximately 4.2 cm above the ariadna.   An enteric tube courses below diaphragm with the tip beyond the field of view.   CARDIOMEDIASTINAL SILHOUETTE: Cardiomediastinal silhouette is normal in size and configuration.   LUNGS: Similar aeration of bilateral lungs with prominent perihilar interstitial markings. Left costophrenic angle is obscured by overlying cardiomediastinal silhouette. No focal consolidation, sizeable pleural effusion or pneumothorax.   ABDOMEN: No remarkable upper abdominal findings.   BONES: No acute osseous changes.       1. Persistent pulmonary edema. Left costophrenic angle is obscured which can be due to overlying cardiomediastinal silhouette versus small pleural effusion. No focal consolidation or pneumothorax.   I personally reviewed the images/study and I agree with the findings as stated by resident physician Dr. aG Noel . This study was interpreted at  [place], [place].   MACRO: None   Signed by: Nadege Parrish 3/21/2024 8:19 AM Dictation workstation:   [id number]    XR abdomen 1 view    Result Date: 3/21/2024  Interpreted By:  Nadege Parrish and Ohs Zachary STUDY: XR ABDOMEN 1 VIEW;  3/20/2024 7:15 pm   INDICATION: Signs/Symptoms:enteral tube placement.   COMPARISON: Chest radiograph 03/06/2024.   ACCESSION NUMBER(S): [id number]   ORDERING CLINICIAN: GILSON DUTTON   FINDINGS: Enteric tube with distal tip and side port overlying the expected location of the stomach.   Nonobstructive bowel gas pattern. No evidence of intra-abdominal free air.   Visualized soft tissues and osseous structures are unremarkable.       1. Enteric tube with distal tip and side port overlying the expected location of the stomach. 2. Nonobstructive bowel gas pattern.   I personally reviewed the images/study and I agree with the findings as stated by Dr. Selvin Marcial. This study was interpreted at [place], [place].   MACRO: none   Signed by: Nadege Parrish 3/21/2024 8:19 AM Dictation workstation:   [id number]    XR chest 1 view    Result Date: 3/21/2024  Interpreted By:  Nadege Parrish and Ohs Zachary STUDY: XR CHEST 1 VIEW;  3/20/2024 7:04 pm   INDICATION: Signs/Symptoms:intubation s/p cardiac arrest.   COMPARISON: Chest radiograph 03/06/2024, CT chest 01/31/2024.   ACCESSION NUMBER(S): [id number]   ORDERING CLINICIAN: GILSON DUTTON   FINDINGS: AP radiograph of the chest was provided.   MEDICAL DEVICES: ETT distal tip 4.2 cm from the ariadna. Enteric tube overlies the expected location of the esophagus and stomach with distal tip not imaged.   CARDIOMEDIASTINAL SILHOUETTE: Heart is similarly enlarged when compared to prior chest radiograph 03/06/2024.   LUNGS: Low lung volumes contribute to bronchovascular crowding. Interval increase in perihilar vascular congestion. Left costophrenic angle is  obscured by overlying cardiac silhouette and/or effusion. The right costophrenic angle is clear. No focal consolidation or pneumothorax.   ABDOMEN: No remarkable upper abdominal findings.   BONES: No acute osseous changes.       1. Interval increase in perihilar vascular congestion. 2. Left costophrenic angle is obscured by overlying cardiac silhouette and/or pleural effusion. 3. Medical devices as above.   I personally reviewed the images/study and I agree with the findings as stated by Dr. Selvin Marcial. This study was interpreted at [place], [place].   MACRO: None   Signed by: Nadege Parrish 3/21/2024 8:18 AM Dictation workstation:   [id number]     Scheduled medications  albumin human, 25 g, intravenous, q6h  calcium chloride, 1 g, intravenous, Once  hydrocortisone sodium succinate, 50 mg, intravenous, q6h  magnesium sulfate, 2 g, intravenous, Once  micafungin, 100 mg, intravenous, q24h  mupirocin, , Each Nostril, BID  oxygen, , inhalation, Continuous - Inhalation  pantoprazole, 40 mg, intravenous, Daily  piperacillin-tazobactam, 3.375 g, intravenous, q6h  sulfur hexafluoride microsphr, 2 mL, intravenous, Once in imaging  vancomycin, 1,000 mg, intravenous, q12h  white petrolatum-mineral oiL, 1 Application, Both Eyes, q6h      Continuous medications  amiodarone, 1 mg/min, Last Rate: 1 mg/min (03/24/24 0700)  angiotensin II (Giapreza) 2.5 mg in sodium chloride 0.9% 250 mL (0.01 mg/mL) infusion, 1.25-40 ng/kg/min, Last Rate: 40 ng/kg/min (03/24/24 0700)  EPINEPHrine, 0-2 mcg/kg/min (Dosing Weight), Last Rate: 0.12 mcg/kg/min (03/24/24 0700)  epoprostenol, 0.05 mcg/kg/min (Ideal), Last Rate: 0.05 mcg/kg/min (03/24/24 0720)  fentaNYL,  mcg/hr, Last Rate: 175 mcg/hr (03/24/24 0700)  heparin, 0-4,000 Units/hr, Last Rate: 2,000 Units/hr (03/24/24 0700)  insulin regular infusion for cardiac surgery, 0-15 Units/hr, Last Rate: 3 Units/hr (03/24/24 0700)  ketamine, 0-2  mg/kg/hr (Dosing Weight), Last Rate: 1.1 mg/kg/hr (03/24/24 0700)  lactated Ringer's, 5 mL/hr, Last Rate: 5 mL/hr (03/24/24 0700)  lactated Ringer's, 5 mL/hr, Last Rate: 5 mL/hr (03/24/24 0700)  norepinephrine, 0.1-3 mcg/kg/min (Dosing Weight), Last Rate: 0.3 mcg/kg/min (03/24/24 0700)  PrismaSol BGK 2/3.5, 2,400 mL/hr, Last Rate: 2,400 mL/hr (03/24/24 0701)  vasopressin, 0.01-0.06 Units/min, Last Rate: 0.06 Units/min (03/24/24 0700)      PRN medications  PRN medications: alteplase, calcium gluconate, calcium gluconate, dextrose, dextrose, dextrose **OR** glucagon, glucagon, heparin, HYDROmorphone, insulin regular, magnesium sulfate, magnesium sulfate, oxymetazoline, potassium chloride, potassium chloride, vancomycin             Assessment/Plan   Principal Problem:    Soft tissue sarcoma (CMS/HCC)  Active Problems:    Extraskeletal myxoid chondrosarcoma (CMS/HCC)    Cardiopulmonary arrest (CMS/HCC)    Assessment:  Jason Hollins is a 63 y.o. 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 Hawkins and Dr. Smith on 3/5/24.  Post operative course was uncomplicated and patient was on RNF until 3/20 for prolonged bed rest to avoid hip flexion to maintain flap integrity.  Patient was on prophylactic lovenox while on bedrest.     3/20: Cardiopulmonary arrest s/p CPR with ROSC after TPA, overnight started on heparin, epo, increased pressor requirements, increased swelling at flap site.     3/21: Hemorrhagic shock, MTP. Firm and large right flap site. Return to OR s/p Exploration of right thigh wound, Evacuation of hematoma. Suture ligation of multiple bleeding vessel and several points in gluteal area.         Plan:  NEURO: History of myxoid chondrosarcoma s/p wide resection sarcoma, sciatic nerve neurolysis of right lower extremity  with right gluteal reconstruction, pedicle ALT, vastus lateralus flap, pedicle gluteal and perisformis flap with Dr. Hawkins and Dr. Smith on 3/5/24 s/p cardiopulmonary arrest with ROSC 3/20. Documented to have followed commands off sedation. Acute post op pain. CT head 3/22 without acute process. Weaned off cisatracurium and propofol overnight. Currently on fentanyl and ketamine infusions.  - no discontinuation of NMB infusion or SAT today given severe ARDS  - continue fentanyl and ketamine  - goal RASS -5  - PT/OT consult when appropriate     CV: History of HTN, HLD. Acute cardiopulmonary arrest 2/2 to possible massive PE vs massive STEMI, received multiple rounds of CPR and one defibrillation.  Sustained ROSC achieved after TPA bolus. On high dose levophed, epinephrine, vaso, angioT2. Plan on 3/21 was for ECMO which was aborted secondary to large hemhorrge at right flap site. Had MTP and taken OR with 5L evacuated. ECHO 3/21: Normal LV, Mod enlarged RV, slight suggestion of a Townsend's sign / RV strain, low normal RV function. ECHO 3/22 with hyperdynamic LV. New onset Afib with RVR overnight, dosed with 150mg amio x2, started infusion at 1mg/min thereafter. No significant changes to vasoactive med doses overnight. Remains on levo 0.28, epi 0.12, vaso 0.06, AT2 40 this am. Lactate downtrending  - continuous EKG/abp monitoring  - Goal map range 65-90  - wean pressors as able   - continue SDS  - continue amio 1mg/min  - continue gentle fluid removal with cvvh today  - continue to trend lactate     PULM: Arrived to SICU intubated julio c-CPR. Concern for massive PE. Started on iEpo, currently on 0.05. Hypoxic respiratory failure. Severe ARDS. Currently on 50%, PEEP 14. ETT exchanged 3/23.  - ARDSnet lung protective strategies  - Wean FiO2 as tolerated.    - keep peep 14  - keep iEpo 0.05  - Frequent ABGs  - additional pulm toilet prn  - daily CXR     GI: NPO. Shock liver, pancreatitis. Elevated TG. Elevated lactate.  Consulted ACS for potential bowel ischemia as cause of persistent lactic acidosis. CT imaging 3/22 with evidence of pancreatitis. No acute surgical indication per ACS. RUQ US 3/22 with thickening of GB wall without cholelithiasis.   - NPO  - replace OG  - PPI  - Trend LFTs, amylase, lipase daily  - no enteral feeds or bowel reg yet       : No history of renal disease. Baseline creatinine 0.6. Anuric RUTH. Elevated CK, downtrending. Metabolic acidosis, total body fluid overload, hyperkalemia. Started on CVVH 3/21, stopped bicarb infusion 3/22.  - remove daniels  - CVVH even to slightly negative  - RFP BID and PRN  - Replete electrolytes judiciously  - appreciate Nephrology involvement  - Creatine kinase levels daily     HEME: Patient was on ppx lovenox for DVT prophylaxis prior to cardiopulmonary arrest. Concern for massive PE patient received bolus of TPA during CPR. Started on heparin infusion overnight given concern for PE. Hemorrhagic shock 3/21, MTP and back to OR s/p Exploration of right thigh woundEvacuation of hematoma. Suture ligation of multiple bleeding vessel and several points in gluteal area. Hematology consulted 3/22 to r/o HLH. Transfused 1 RBC overnight 3/22-3/23. Thrombocytopenia, coagulapathy  - cbc, coags, fibrinogen bid and prn  - ongoing monitoring for s/s bleeding  - close surveillance of RLE and drains    ENDO: History of DM2. A1c 7.5%. TSH WNL 1/2024  - continue insulin drip, transition off when more stable     ID:  Hypothermic requiring warming. No leukocytosis. On broad antimicrobial therapy. MRSA screen + 2/28/24. Pan cultures sent 3/22. Sputum with GPC  - temp q4h, wbc daily  - ongoing monitoring for s/s infection  - continue zosyn/vancomycin/john and mupirocin nasal ointment    Lines: 3/21  - right radial arterial line  - left femoral trialysis  - left subclavian central line  - PIV     Dispo: ICU. Patient seen and discussed with ICU attending Dr. Rouse.     SICU phone [phone]    Critical  care time: 60 minutes    Gracie Skaggs, APRN-CNP

## 2024-03-24 NOTE — PROGRESS NOTES
"    Department of Plastic and Reconstructive Surgery  Daily Progress Note    Patient Name: Jason Hollins  MRN: [id number]  Date:  03/24/24     Subjective  Found resting in bed critically ill in CTICU. Remains intubated, sedated, CRRT    Overnight Events  ETT exchanged last evening given persistent air leak    Objective    Vital Signs  /60   Pulse (!) 117   Temp 36.6 °C (97.9 °F) (Bladder)   Resp 24   Ht 1.778 m (5' 10\")   Wt 129 kg (284 lb 2.8 oz)   SpO2 96%   BMI 40.77 kg/m²      Physical Exam   Constitutional: Intubated and sedated  ENMT: moist mucous membranes, ETT in place, NGT in place  Cardiovascular: Tachycardia/irregular rhythm on telemetry monitor  Respiratory/Thorax: ETT in place with ventilator support 50% FiO2  Gastrointestinal: abdomen soft, non distended  Genitourinary: indwelling daniels with scant hematuria, oliguric. CVVHD currently running removing 70cc/hr  Musculoskeletal: Sedated/paralyzed  Extremities: Generalized edema, Right 4Fr CFA and 7Fr CFV sheath in place  RLE: right thigh edematous, large, no bruising noted, soft and compressible, incisional wound vac in place, holding suction at -75 mmHG, no leak or alarm present, ANDERSON drain x 3 with dark serous output  BLE neurovascular intact, cap refill < 2 sec, +df/pf, DP/PT/ radial pulses 2+, no drainage noted.   Neurological:  HANNAH, intubated and sedated  Psychological: HANNAH, intubated and sedated  Skin: Warm, dry, intact  Diagnostics   Results for orders placed or performed during the hospital encounter of 03/05/24 (from the past 24 hour(s))   POCT GLUCOSE   Result Value Ref Range    POCT Glucose 153 (H) 74 - 99 mg/dL   Blood Gas Arterial Full Panel   Result Value Ref Range    POCT pH, Arterial 7.37 (L) 7.38 - 7.42 pH    POCT pCO2, Arterial 35 (L) 38 - 42 mm Hg    POCT pO2, Arterial 79 (L) 85 - 95 mm Hg    POCT SO2, Arterial 98 94 - 100 %    POCT Oxy Hemoglobin, Arterial 96.3 94.0 - 98.0 %    POCT Hematocrit Calculated, Arterial 26.0 (L) 41.0 - " 52.0 %    POCT Sodium, Arterial 133 (L) 136 - 145 mmol/L    POCT Potassium, Arterial 4.0 3.5 - 5.3 mmol/L    POCT Chloride, Arterial 99 98 - 107 mmol/L    POCT Ionized Calcium, Arterial 1.09 (L) 1.10 - 1.33 mmol/L    POCT Glucose, Arterial 155 (H) 74 - 99 mg/dL    POCT Lactate, Arterial 7.3 (HH) 0.4 - 2.0 mmol/L    POCT Base Excess, Arterial -4.6 (L) -2.0 - 3.0 mmol/L    POCT HCO3 Calculated, Arterial 20.2 (L) 22.0 - 26.0 mmol/L    POCT Hemoglobin, Arterial 8.6 (L) 13.5 - 17.5 g/dL    POCT Anion Gap, Arterial 18 10 - 25 mmo/L    Patient Temperature 37.0 degrees Celsius    FiO2 60 %   Vancomycin, Trough   Result Value Ref Range    Vancomycin, Trough 13.9 5.0 - 20.0 ug/mL   Blood Gas Arterial Full Panel   Result Value Ref Range    POCT pH, Arterial 7.39 7.38 - 7.42 pH    POCT pCO2, Arterial 33 (L) 38 - 42 mm Hg    POCT pO2, Arterial 82 (L) 85 - 95 mm Hg    POCT SO2, Arterial 98 94 - 100 %    POCT Oxy Hemoglobin, Arterial 96.1 94.0 - 98.0 %    POCT Hematocrit Calculated, Arterial 31.0 (L) 41.0 - 52.0 %    POCT Sodium, Arterial 136 136 - 145 mmol/L    POCT Potassium, Arterial 4.6 3.5 - 5.3 mmol/L    POCT Chloride, Arterial 101 98 - 107 mmol/L    POCT Ionized Calcium, Arterial 1.07 (L) 1.10 - 1.33 mmol/L    POCT Glucose, Arterial 168 (H) 74 - 99 mg/dL    POCT Lactate, Arterial 7.6 (HH) 0.4 - 2.0 mmol/L    POCT Base Excess, Arterial -4.3 (L) -2.0 - 3.0 mmol/L    POCT HCO3 Calculated, Arterial 20.0 (L) 22.0 - 26.0 mmol/L    POCT Hemoglobin, Arterial 10.4 (L) 13.5 - 17.5 g/dL    POCT Anion Gap, Arterial 20 10 - 25 mmo/L    Patient Temperature 37.0 degrees Celsius    FiO2 60 %   CBC   Result Value Ref Range    WBC 11.8 (H) 4.4 - 11.3 x10*3/uL    nRBC 5.7 (H) 0.0 - 0.0 /100 WBCs    RBC 2.91 (L) 4.50 - 5.90 x10*6/uL    Hemoglobin 9.2 (L) 13.5 - 17.5 g/dL    Hematocrit 25.2 (L) 41.0 - 52.0 %    MCV 87 80 - 100 fL    MCH 31.6 26.0 - 34.0 pg    MCHC 36.5 (H) 32.0 - 36.0 g/dL    RDW 15.8 (H) 11.5 - 14.5 %    Platelets 90 (L) 150 -  450 x10*3/uL   Magnesium   Result Value Ref Range    Magnesium 2.13 1.60 - 2.40 mg/dL   Renal Function Panel   Result Value Ref Range    Glucose 152 (H) 74 - 99 mg/dL    Sodium 136 136 - 145 mmol/L    Potassium 4.1 3.5 - 5.3 mmol/L    Chloride 97 (L) 98 - 107 mmol/L    Bicarbonate 20 (L) 21 - 32 mmol/L    Anion Gap 23 (H) 10 - 20 mmol/L    Urea Nitrogen 24 (H) 6 - 23 mg/dL    Creatinine 2.34 (H) 0.50 - 1.30 mg/dL    eGFR 31 (L) >60 mL/min/1.73m*2    Calcium 8.2 (L) 8.6 - 10.6 mg/dL    Phosphorus 3.6 2.5 - 4.9 mg/dL    Albumin 2.8 (L) 3.4 - 5.0 g/dL   Calcium, ionized   Result Value Ref Range    POCT Calcium, Ionized 1.10 1.1 - 1.33 mmol/L   POCT GLUCOSE   Result Value Ref Range    POCT Glucose 153 (H) 74 - 99 mg/dL   Blood Gas Arterial Full Panel   Result Value Ref Range    POCT pH, Arterial 7.31 (L) 7.38 - 7.42 pH    POCT pCO2, Arterial 40 38 - 42 mm Hg    POCT pO2, Arterial 101 (H) 85 - 95 mm Hg    POCT SO2, Arterial 98 94 - 100 %    POCT Oxy Hemoglobin, Arterial 96.4 94.0 - 98.0 %    POCT Hematocrit Calculated, Arterial 26.0 (L) 41.0 - 52.0 %    POCT Sodium, Arterial 132 (L) 136 - 145 mmol/L    POCT Potassium, Arterial 4.1 3.5 - 5.3 mmol/L    POCT Chloride, Arterial 98 98 - 107 mmol/L    POCT Ionized Calcium, Arterial 1.10 1.10 - 1.33 mmol/L    POCT Glucose, Arterial 178 (H) 74 - 99 mg/dL    POCT Lactate, Arterial 6.7 (HH) 0.4 - 2.0 mmol/L    POCT Base Excess, Arterial -5.7 (L) -2.0 - 3.0 mmol/L    POCT HCO3 Calculated, Arterial 20.1 (L) 22.0 - 26.0 mmol/L    POCT Hemoglobin, Arterial 8.5 (L) 13.5 - 17.5 g/dL    POCT Anion Gap, Arterial 18 10 - 25 mmo/L    Patient Temperature 37.0 degrees Celsius    FiO2 60 %   POCT GLUCOSE   Result Value Ref Range    POCT Glucose 161 (H) 74 - 99 mg/dL   POCT GLUCOSE   Result Value Ref Range    POCT Glucose 185 (H) 74 - 99 mg/dL   Heparin Assay, UFH   Result Value Ref Range    Heparin Unfractionated 0.1 See Comment Below for Therapeutic Ranges IU/mL   Blood Gas Arterial Full  Panel   Result Value Ref Range    POCT pH, Arterial 7.33 (L) 7.38 - 7.42 pH    POCT pCO2, Arterial 38 38 - 42 mm Hg    POCT pO2, Arterial 92 85 - 95 mm Hg    POCT SO2, Arterial 98 94 - 100 %    POCT Oxy Hemoglobin, Arterial 96.3 94.0 - 98.0 %    POCT Hematocrit Calculated, Arterial 24.0 (L) 41.0 - 52.0 %    POCT Sodium, Arterial 132 (L) 136 - 145 mmol/L    POCT Potassium, Arterial 4.2 3.5 - 5.3 mmol/L    POCT Chloride, Arterial 97 (L) 98 - 107 mmol/L    POCT Ionized Calcium, Arterial 1.15 1.10 - 1.33 mmol/L    POCT Glucose, Arterial 182 (H) 74 - 99 mg/dL    POCT Lactate, Arterial 6.1 (HH) 0.4 - 2.0 mmol/L    POCT Base Excess, Arterial -5.5 (L) -2.0 - 3.0 mmol/L    POCT HCO3 Calculated, Arterial 20.0 (L) 22.0 - 26.0 mmol/L    POCT Hemoglobin, Arterial 8.1 (L) 13.5 - 17.5 g/dL    POCT Anion Gap, Arterial 19 10 - 25 mmo/L    Patient Temperature 37.0 degrees Celsius    FiO2 50 %   Hepatic function panel   Result Value Ref Range    Albumin 3.2 (L) 3.4 - 5.0 g/dL    Bilirubin, Total 5.7 (H) 0.0 - 1.2 mg/dL    Bilirubin, Direct 3.5 (H) 0.0 - 0.3 mg/dL    Alkaline Phosphatase 105 33 - 136 U/L    ALT 1,714 (H) 10 - 52 U/L    AST 1,550 (H) 9 - 39 U/L    Total Protein 4.4 (L) 6.4 - 8.2 g/dL   Blood Gas Arterial Full Panel   Result Value Ref Range    POCT pH, Arterial 7.34 (L) 7.38 - 7.42 pH    POCT pCO2, Arterial 39 38 - 42 mm Hg    POCT pO2, Arterial 101 (H) 85 - 95 mm Hg    POCT SO2, Arterial 99 94 - 100 %    POCT Oxy Hemoglobin, Arterial 95.8 94.0 - 98.0 %    POCT Hematocrit Calculated, Arterial 24.0 (L) 41.0 - 52.0 %    POCT Sodium, Arterial 130 (L) 136 - 145 mmol/L    POCT Potassium, Arterial 4.1 3.5 - 5.3 mmol/L    POCT Chloride, Arterial 97 (L) 98 - 107 mmol/L    POCT Ionized Calcium, Arterial      POCT Glucose, Arterial 179 (H) 74 - 99 mg/dL    POCT Lactate, Arterial 5.6 (HH) 0.4 - 2.0 mmol/L    POCT Base Excess, Arterial -4.4 (L) -2.0 - 3.0 mmol/L    POCT HCO3 Calculated, Arterial 21.0 (L) 22.0 - 26.0 mmol/L    POCT  Hemoglobin, Arterial 8.0 (L) 13.5 - 17.5 g/dL    POCT Anion Gap, Arterial 16 10 - 25 mmo/L    Patient Temperature 37.0 degrees Celsius    FiO2 50 %   CBC   Result Value Ref Range    WBC 13.4 (H) 4.4 - 11.3 x10*3/uL    nRBC 6.4 (H) 0.0 - 0.0 /100 WBCs    RBC 2.65 (L) 4.50 - 5.90 x10*6/uL    Hemoglobin 8.1 (L) 13.5 - 17.5 g/dL    Hematocrit 22.0 (L) 41.0 - 52.0 %    MCV 83 80 - 100 fL    MCH 30.6 26.0 - 34.0 pg    MCHC 36.8 (H) 32.0 - 36.0 g/dL    RDW 15.7 (H) 11.5 - 14.5 %    Platelets 73 (L) 150 - 450 x10*3/uL   Coagulation Screen   Result Value Ref Range    Protime 21.0 (H) 9.8 - 12.8 seconds    INR 1.9 (H) 0.9 - 1.1    aPTT 50 (H) 27 - 38 seconds   Magnesium   Result Value Ref Range    Magnesium 2.00 1.60 - 2.40 mg/dL   Calcium, ionized   Result Value Ref Range    POCT Calcium, Ionized 1.11 1.1 - 1.33 mmol/L   Heparin Assay, UFH   Result Value Ref Range    Heparin Unfractionated 0.1 See Comment Below for Therapeutic Ranges IU/mL   Basic Metabolic Panel   Result Value Ref Range    Glucose 190 (H) 74 - 99 mg/dL    Sodium 134 (L) 136 - 145 mmol/L    Potassium 4.0 3.5 - 5.3 mmol/L    Chloride 96 (L) 98 - 107 mmol/L    Bicarbonate 21 21 - 32 mmol/L    Anion Gap 21 (H) 10 - 20 mmol/L    Urea Nitrogen 23 6 - 23 mg/dL    Creatinine 2.33 (H) 0.50 - 1.30 mg/dL    eGFR 31 (L) >60 mL/min/1.73m*2    Calcium 8.4 (L) 8.6 - 10.6 mg/dL   Phosphorus   Result Value Ref Range    Phosphorus 3.5 2.5 - 4.9 mg/dL   Blood Gas Arterial Full Panel   Result Value Ref Range    POCT pH, Arterial 7.33 (L) 7.38 - 7.42 pH    POCT pCO2, Arterial 37 (L) 38 - 42 mm Hg    POCT pO2, Arterial 86 85 - 95 mm Hg    POCT SO2, Arterial 98 94 - 100 %    POCT Oxy Hemoglobin, Arterial 95.6 94.0 - 98.0 %    POCT Hematocrit Calculated, Arterial 24.0 (L) 41.0 - 52.0 %    POCT Sodium, Arterial 132 (L) 136 - 145 mmol/L    POCT Potassium, Arterial 4.1 3.5 - 5.3 mmol/L    POCT Chloride, Arterial 97 (L) 98 - 107 mmol/L    POCT Ionized Calcium, Arterial 1.17 1.10 -  1.33 mmol/L    POCT Glucose, Arterial 185 (H) 74 - 99 mg/dL    POCT Lactate, Arterial 5.9 (HH) 0.4 - 2.0 mmol/L    POCT Base Excess, Arterial -5.9 (L) -2.0 - 3.0 mmol/L    POCT HCO3 Calculated, Arterial 19.5 (L) 22.0 - 26.0 mmol/L    POCT Hemoglobin, Arterial 7.9 (L) 13.5 - 17.5 g/dL    POCT Anion Gap, Arterial 20 10 - 25 mmo/L    Patient Temperature 37.0 degrees Celsius    FiO2 40 %   Heparin Assay, UFH   Result Value Ref Range    Heparin Unfractionated 0.2 See Comment Below for Therapeutic Ranges IU/mL   Blood Gas Arterial Full Panel Unsolicited   Result Value Ref Range    POCT pH, Arterial 7.28 (L) 7.38 - 7.42 pH    POCT pCO2, Arterial 42 38 - 42 mm Hg    POCT pO2, Arterial 53 (L) 85 - 95 mm Hg    POCT SO2, Arterial 88 (L) 94 - 100 %    POCT Oxy Hemoglobin, Arterial 84.9 (L) 94.0 - 98.0 %    POCT Hematocrit Calculated, Arterial 24.0 (L) 41.0 - 52.0 %    POCT Sodium, Arterial 132 (L) 136 - 145 mmol/L    POCT Potassium, Arterial 4.3 3.5 - 5.3 mmol/L    POCT Chloride, Arterial 97 (L) 98 - 107 mmol/L    POCT Ionized Calcium, Arterial 1.16 1.10 - 1.33 mmol/L    POCT Glucose, Arterial 177 (H) 74 - 99 mg/dL    POCT Lactate, Arterial 6.3 (HH) 0.4 - 2.0 mmol/L    POCT Base Excess, Arterial -6.6 (L) -2.0 - 3.0 mmol/L    POCT HCO3 Calculated, Arterial 19.7 (L) 22.0 - 26.0 mmol/L    POCT Hemoglobin, Arterial 8.0 (L) 13.5 - 17.5 g/dL    POCT Anion Gap, Arterial 20 10 - 25 mmo/L    Patient Temperature 37.0 degrees Celsius   Blood Gas Arterial Full Panel   Result Value Ref Range    POCT pH, Arterial 7.31 (L) 7.38 - 7.42 pH    POCT pCO2, Arterial 40 38 - 42 mm Hg    POCT pO2, Arterial 219 (H) 85 - 95 mm Hg    POCT SO2, Arterial 100 94 - 100 %    POCT Oxy Hemoglobin, Arterial 97.1 94.0 - 98.0 %    POCT Hematocrit Calculated, Arterial 24.0 (L) 41.0 - 52.0 %    POCT Sodium, Arterial 129 (L) 136 - 145 mmol/L    POCT Potassium, Arterial 4.1 3.5 - 5.3 mmol/L    POCT Chloride, Arterial 98 98 - 107 mmol/L    POCT Ionized Calcium,  Arterial 1.12 1.10 - 1.33 mmol/L    POCT Glucose, Arterial 186 (H) 74 - 99 mg/dL    POCT Lactate, Arterial 6.4 (HH) 0.4 - 2.0 mmol/L    POCT Base Excess, Arterial -5.7 (L) -2.0 - 3.0 mmol/L    POCT HCO3 Calculated, Arterial 20.1 (L) 22.0 - 26.0 mmol/L    POCT Hemoglobin, Arterial 8.1 (L) 13.5 - 17.5 g/dL    POCT Anion Gap, Arterial 15 10 - 25 mmo/L    Patient Temperature 37.0 degrees Celsius    FiO2 100 %   POCT GLUCOSE   Result Value Ref Range    POCT Glucose 177 (H) 74 - 99 mg/dL   Blood Gas Arterial Full Panel   Result Value Ref Range    POCT pH, Arterial 7.32 (L) 7.38 - 7.42 pH    POCT pCO2, Arterial 38 38 - 42 mm Hg    POCT pO2, Arterial 121 (H) 85 - 95 mm Hg    POCT SO2, Arterial 99 94 - 100 %    POCT Oxy Hemoglobin, Arterial 95.8 94.0 - 98.0 %    POCT Hematocrit Calculated, Arterial 24.0 (L) 41.0 - 52.0 %    POCT Sodium, Arterial 130 (L) 136 - 145 mmol/L    POCT Potassium, Arterial 3.9 3.5 - 5.3 mmol/L    POCT Chloride, Arterial 99 98 - 107 mmol/L    POCT Ionized Calcium, Arterial 1.08 (L) 1.10 - 1.33 mmol/L    POCT Glucose, Arterial 171 (H) 74 - 99 mg/dL    POCT Lactate, Arterial 5.7 (HH) 0.4 - 2.0 mmol/L    POCT Base Excess, Arterial -6.0 (L) -2.0 - 3.0 mmol/L    POCT HCO3 Calculated, Arterial 19.6 (L) 22.0 - 26.0 mmol/L    POCT Hemoglobin, Arterial 7.9 (L) 13.5 - 17.5 g/dL    POCT Anion Gap, Arterial 15 10 - 25 mmo/L    Patient Temperature 37.0 degrees Celsius    FiO2 50 %   Heparin Assay, UFH   Result Value Ref Range    Heparin Unfractionated 0.2 See Comment Below for Therapeutic Ranges IU/mL     Current Medications  Scheduled medications  albumin human, 25 g, intravenous, q6h  calcium chloride, 1 g, intravenous, Once  hydrocortisone sodium succinate, 50 mg, intravenous, q6h  magnesium sulfate, 2 g, intravenous, Once  micafungin, 100 mg, intravenous, q24h  mupirocin, , Each Nostril, BID  oxygen, , inhalation, Continuous - Inhalation  pantoprazole, 40 mg, intravenous, Daily  piperacillin-tazobactam, 3.375  g, intravenous, q6h  sulfur hexafluoride microsphr, 2 mL, intravenous, Once in imaging  vancomycin, 1,000 mg, intravenous, q12h  white petrolatum-mineral oiL, 1 Application, Both Eyes, q6h      Continuous medications  amiodarone, 1 mg/min, Last Rate: 1 mg/min (03/24/24 0700)  angiotensin II (Giapreza) 2.5 mg in sodium chloride 0.9% 250 mL (0.01 mg/mL) infusion, 1.25-40 ng/kg/min, Last Rate: 40 ng/kg/min (03/24/24 0700)  EPINEPHrine, 0-2 mcg/kg/min (Dosing Weight), Last Rate: 0.12 mcg/kg/min (03/24/24 0700)  epoprostenol, 0.05 mcg/kg/min (Ideal), Last Rate: 0.05 mcg/kg/min (03/24/24 0720)  fentaNYL,  mcg/hr, Last Rate: 175 mcg/hr (03/24/24 0907)  heparin, 0-4,000 Units/hr, Last Rate: 2,000 Units/hr (03/24/24 0700)  insulin regular infusion for cardiac surgery, 0-15 Units/hr, Last Rate: 3 Units/hr (03/24/24 0700)  ketamine, 0-2 mg/kg/hr (Dosing Weight), Last Rate: 1.1 mg/kg/hr (03/24/24 0700)  lactated Ringer's, 5 mL/hr, Last Rate: 5 mL/hr (03/24/24 0700)  lactated Ringer's, 5 mL/hr, Last Rate: 5 mL/hr (03/24/24 0700)  norepinephrine, 0.1-3 mcg/kg/min (Dosing Weight), Last Rate: 0.275 mcg/kg/min (03/24/24 0830)  PrismaSol BGK 2/3.5, 2,400 mL/hr, Last Rate: 2,400 mL/hr (03/24/24 0701)  vasopressin, 0.01-0.06 Units/min, Last Rate: 0.06 Units/min (03/24/24 0700)      PRN medications  PRN medications: alteplase, calcium gluconate, calcium gluconate, dextrose, dextrose, dextrose **OR** glucagon, glucagon, heparin, HYDROmorphone, insulin regular, magnesium sulfate, magnesium sulfate, oxymetazoline, potassium chloride, potassium chloride, vancomycin     Assessment  63 y.o. 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 Hawkins and Dr. Smith on 3/5/24.  Post operative course was uncomplicated and patient was on RNF until 3/20  for prolonged bed rest to avoid hip flexion to maintain flap integrity.  Patient was on prophylactic lovenox while on bedrest. Per nursing report, patient was getting to the side of the bed today to get up and walk when he had sudden chest heaviness. Pre-cardiac arrest EKG with signs of anterior ischemia. Nursing was called by family because patient went unresponsive.  ACLS was immediately started. ROSC was achieved after 1 round of CPR, however patient quickly decompensated into PEA.  Airway was secured.  Multiple rounds of CPR were completed with intermittent ROSC prior to transport to CTICU.  Patient arrived to CTICU under SICU care with active CPR underway.  Primary concern for STEMI vs massive PE.  Bedside POCUS TTE with biventricular failure and dilated RV.  RV septal bowing. Decided to give TPA to treat massive PE.  After several minutes of CPR patient achieved  sustained ROSC.  See code documentation note for further details.     3/20: Now s/p CPR with ROSC after TPA, overnight started on heparin, epo, increased pressor requirements, increased swelling at flap site.   3/21: MTP transfusions. Return to OR for exploration of R Flap site now s/p Exploratin of right thigh wound evacuation of hematoma. Suture ligation of multiple bleeding vessel and several points in gluteal area with Dr. Smith.    Plan/Recommendations  S/p Exploratin of right thigh wound evacuation of hematoma. Suture ligation of multiple bleeding vessel and several points in gluteal area:  A/P:   - remains critically ill in CTICU on high dose pressors (Levo/Vaso/Epi/AT2)  - Maintain incisional wound vac to right gluteal/thigh area        ·  Settings: -75mmHg low continuous suction        ·  Notify plastic surgery with any concerns of leak or obstruction   - OK to resume low dose heparin gtt from plastic standpoint, continue monitoring heparin assay. Management per SICU  - monitor right thigh for worsening s/s of active bleeding  - Continue ANDERSON  drain care per nursing      ·  Strip drain tubing TID and PRN     ·  Monitor and record output q8h      ·  Keep drain sites C/D/I with daily drain dressing changes      ·  Notify plastics if ANDERSON drain output exceeds > 100 ml/hr in one drain or > 300 ml/hr collectively  - Operative findings 3/21: Actively Bleeding Gluteal Arterial Perforators, Generalized Oozing, healthy Flap, EBL 6.5L (3L of which was retained Hematoma)   - continue IV abx ICU  - Appreciate remaining care per CTICU/SICU  - Plastics to follow     Patient and plan discussed with Dr. Smith     Plastic and Reconstructive Surgery   [place]  Pager #[phone]  Team phones: [phone]      PHILLIP Dalal-CNP  Plastic and Reconstructive Surgery   Available via Epic chat, pager: [phone] or team phones: [phone]

## 2024-03-25 ENCOUNTER — APPOINTMENT (OUTPATIENT)
Dept: RADIOLOGY | Facility: HOSPITAL | Age: 64
DRG: 003 | End: 2024-03-25
Payer: COMMERCIAL

## 2024-03-25 ENCOUNTER — APPOINTMENT (OUTPATIENT)
Dept: VASCULAR MEDICINE | Facility: HOSPITAL | Age: 64
DRG: 003 | End: 2024-03-25
Payer: COMMERCIAL

## 2024-03-25 LAB
ABO GROUP (TYPE) IN BLOOD: NORMAL
ALBUMIN SERPL BCP-MCNC: 3.4 G/DL (ref 3.4–5)
ALBUMIN SERPL BCP-MCNC: 3.4 G/DL (ref 3.4–5)
ALBUMIN SERPL BCP-MCNC: 3.5 G/DL (ref 3.4–5)
ALP SERPL-CCNC: 123 U/L (ref 33–136)
ALT SERPL W P-5'-P-CCNC: 1524 U/L (ref 10–52)
ANION GAP BLDA CALCULATED.4IONS-SCNC: 15 MMO/L (ref 10–25)
ANION GAP BLDA CALCULATED.4IONS-SCNC: 19 MMO/L (ref 10–25)
ANION GAP SERPL CALC-SCNC: 20 MMOL/L (ref 10–20)
ANION GAP SERPL CALC-SCNC: 20 MMOL/L (ref 10–20)
ANTIBODY SCREEN: NORMAL
APTT PPP: 184 SECONDS (ref 27–38)
APTT PPP: >200 SECONDS (ref 27–38)
AST SERPL W P-5'-P-CCNC: 1253 U/L (ref 9–39)
BASE EXCESS BLDA CALC-SCNC: -6.2 MMOL/L (ref -2–3)
BASE EXCESS BLDA CALC-SCNC: -6.7 MMOL/L (ref -2–3)
BILIRUB DIRECT SERPL-MCNC: 5.6 MG/DL (ref 0–0.3)
BILIRUB SERPL-MCNC: 8.8 MG/DL (ref 0–1.2)
BLOOD EXPIRATION DATE: NORMAL
BODY TEMPERATURE: 37 DEGREES CELSIUS
BODY TEMPERATURE: 37 DEGREES CELSIUS
BUN SERPL-MCNC: 21 MG/DL (ref 6–23)
BUN SERPL-MCNC: 23 MG/DL (ref 6–23)
CA-I BLD-SCNC: 1.07 MMOL/L (ref 1.1–1.33)
CA-I BLD-SCNC: 1.1 MMOL/L (ref 1.1–1.33)
CA-I BLDA-SCNC: 1.03 MMOL/L (ref 1.1–1.33)
CA-I BLDA-SCNC: 1.11 MMOL/L (ref 1.1–1.33)
CALCIUM SERPL-MCNC: 8.3 MG/DL (ref 8.6–10.6)
CALCIUM SERPL-MCNC: 8.5 MG/DL (ref 8.6–10.6)
CHLORIDE BLDA-SCNC: 101 MMOL/L (ref 98–107)
CHLORIDE BLDA-SCNC: 97 MMOL/L (ref 98–107)
CHLORIDE SERPL-SCNC: 96 MMOL/L (ref 98–107)
CHLORIDE SERPL-SCNC: 97 MMOL/L (ref 98–107)
CK SERPL-CCNC: 741 U/L (ref 0–325)
CO2 SERPL-SCNC: 20 MMOL/L (ref 21–32)
CO2 SERPL-SCNC: 20 MMOL/L (ref 21–32)
CREAT SERPL-MCNC: 2.09 MG/DL (ref 0.5–1.3)
CREAT SERPL-MCNC: 2.22 MG/DL (ref 0.5–1.3)
DISPENSE STATUS: NORMAL
EGFRCR SERPLBLD CKD-EPI 2021: 33 ML/MIN/1.73M*2
EGFRCR SERPLBLD CKD-EPI 2021: 35 ML/MIN/1.73M*2
ERYTHROCYTE [DISTWIDTH] IN BLOOD BY AUTOMATED COUNT: 15.9 % (ref 11.5–14.5)
FIBRINOGEN PPP-MCNC: 306 MG/DL (ref 200–400)
GLUCOSE BLD MANUAL STRIP-MCNC: 140 MG/DL (ref 74–99)
GLUCOSE BLD MANUAL STRIP-MCNC: 141 MG/DL (ref 74–99)
GLUCOSE BLD MANUAL STRIP-MCNC: 142 MG/DL (ref 74–99)
GLUCOSE BLD MANUAL STRIP-MCNC: 147 MG/DL (ref 74–99)
GLUCOSE BLD MANUAL STRIP-MCNC: 149 MG/DL (ref 74–99)
GLUCOSE BLD MANUAL STRIP-MCNC: 154 MG/DL (ref 74–99)
GLUCOSE BLD MANUAL STRIP-MCNC: 166 MG/DL (ref 74–99)
GLUCOSE BLD MANUAL STRIP-MCNC: 171 MG/DL (ref 74–99)
GLUCOSE BLDA-MCNC: 132 MG/DL (ref 74–99)
GLUCOSE BLDA-MCNC: 133 MG/DL (ref 74–99)
GLUCOSE SERPL-MCNC: 144 MG/DL (ref 74–99)
GLUCOSE SERPL-MCNC: 144 MG/DL (ref 74–99)
HCO3 BLDA-SCNC: 18.2 MMOL/L (ref 22–26)
HCO3 BLDA-SCNC: 18.8 MMOL/L (ref 22–26)
HCT VFR BLD AUTO: 20.8 % (ref 41–52)
HCT VFR BLD AUTO: 22.8 % (ref 41–52)
HCT VFR BLD AUTO: 23.7 % (ref 41–52)
HCT VFR BLD EST: 23 % (ref 41–52)
HCT VFR BLD EST: 26 % (ref 41–52)
HGB BLD-MCNC: 7.6 G/DL (ref 13.5–17.5)
HGB BLD-MCNC: 8.2 G/DL (ref 13.5–17.5)
HGB BLD-MCNC: 8.2 G/DL (ref 13.5–17.5)
HGB BLDA-MCNC: 7.6 G/DL (ref 13.5–17.5)
HGB BLDA-MCNC: 8.5 G/DL (ref 13.5–17.5)
INHALED O2 CONCENTRATION: 50 %
INHALED O2 CONCENTRATION: 50 %
INR PPP: 1.8 (ref 0.9–1.1)
INR PPP: 1.9 (ref 0.9–1.1)
LACTATE BLDA-SCNC: 5.5 MMOL/L (ref 0.4–2)
LACTATE BLDA-SCNC: 5.8 MMOL/L (ref 0.4–2)
LIPASE SERPL-CCNC: 59 U/L (ref 9–82)
MAGNESIUM SERPL-MCNC: 2.15 MG/DL (ref 1.6–2.4)
MAGNESIUM SERPL-MCNC: 2.28 MG/DL (ref 1.6–2.4)
MCH RBC QN AUTO: 28.4 PG (ref 26–34)
MCH RBC QN AUTO: 29.5 PG (ref 26–34)
MCH RBC QN AUTO: 29.8 PG (ref 26–34)
MCHC RBC AUTO-ENTMCNC: 34.6 G/DL (ref 32–36)
MCHC RBC AUTO-ENTMCNC: 36 G/DL (ref 32–36)
MCHC RBC AUTO-ENTMCNC: 36.5 G/DL (ref 32–36)
MCV RBC AUTO: 82 FL (ref 80–100)
NRBC BLD-RTO: 4.1 /100 WBCS (ref 0–0)
NRBC BLD-RTO: 4.8 /100 WBCS (ref 0–0)
NRBC BLD-RTO: 4.9 /100 WBCS (ref 0–0)
OXYHGB MFR BLDA: 95.6 % (ref 94–98)
OXYHGB MFR BLDA: 96.4 % (ref 94–98)
PCO2 BLDA: 33 MM HG (ref 38–42)
PCO2 BLDA: 34 MM HG (ref 38–42)
PH BLDA: 7.35 PH (ref 7.38–7.42)
PH BLDA: 7.35 PH (ref 7.38–7.42)
PHOSPHATE SERPL-MCNC: 3.1 MG/DL (ref 2.5–4.9)
PHOSPHATE SERPL-MCNC: 3.2 MG/DL (ref 2.5–4.9)
PLATELET # BLD AUTO: 77 X10*3/UL (ref 150–450)
PLATELET # BLD AUTO: 79 X10*3/UL (ref 150–450)
PLATELET # BLD AUTO: 83 X10*3/UL (ref 150–450)
PO2 BLDA: 103 MM HG (ref 85–95)
PO2 BLDA: 95 MM HG (ref 85–95)
POTASSIUM BLDA-SCNC: 4.2 MMOL/L (ref 3.5–5.3)
POTASSIUM BLDA-SCNC: 4.3 MMOL/L (ref 3.5–5.3)
POTASSIUM SERPL-SCNC: 4.1 MMOL/L (ref 3.5–5.3)
POTASSIUM SERPL-SCNC: 4.2 MMOL/L (ref 3.5–5.3)
PRODUCT BLOOD TYPE: 5100
PRODUCT CODE: NORMAL
PROT SERPL-MCNC: 5 G/DL (ref 6.4–8.2)
PROTHROMBIN TIME: 20.2 SECONDS (ref 9.8–12.8)
PROTHROMBIN TIME: 21.7 SECONDS (ref 9.8–12.8)
RBC # BLD AUTO: 2.55 X10*6/UL (ref 4.5–5.9)
RBC # BLD AUTO: 2.78 X10*6/UL (ref 4.5–5.9)
RBC # BLD AUTO: 2.89 X10*6/UL (ref 4.5–5.9)
RH FACTOR (ANTIGEN D): NORMAL
SAO2 % BLDA: 98 % (ref 94–100)
SAO2 % BLDA: 99 % (ref 94–100)
SODIUM BLDA-SCNC: 130 MMOL/L (ref 136–145)
SODIUM BLDA-SCNC: 130 MMOL/L (ref 136–145)
SODIUM SERPL-SCNC: 132 MMOL/L (ref 136–145)
SODIUM SERPL-SCNC: 133 MMOL/L (ref 136–145)
STAPHYLOCOCCUS SPEC CULT: ABNORMAL
UFH PPP CHRO-ACNC: 0.4 IU/ML
UNIT ABO: NORMAL
UNIT NUMBER: NORMAL
UNIT RH: NORMAL
UNIT VOLUME: 291
VANCOMYCIN SERPL-MCNC: 17 UG/ML (ref 5–20)
VANCOMYCIN TROUGH SERPL-MCNC: 16 UG/ML (ref 5–20)
WBC # BLD AUTO: 18.7 X10*3/UL (ref 4.4–11.3)
WBC # BLD AUTO: 20.7 X10*3/UL (ref 4.4–11.3)
WBC # BLD AUTO: 21.5 X10*3/UL (ref 4.4–11.3)
XM INTEP: NORMAL

## 2024-03-25 PROCEDURE — 2500000004 HC RX 250 GENERAL PHARMACY W/ HCPCS (ALT 636 FOR OP/ED)

## 2024-03-25 PROCEDURE — 90937 HEMODIALYSIS REPEATED EVAL: CPT

## 2024-03-25 PROCEDURE — 84075 ASSAY ALKALINE PHOSPHATASE: CPT | Performed by: NURSE PRACTITIONER

## 2024-03-25 PROCEDURE — 2500000004 HC RX 250 GENERAL PHARMACY W/ HCPCS (ALT 636 FOR OP/ED): Performed by: NURSE PRACTITIONER

## 2024-03-25 PROCEDURE — 80202 ASSAY OF VANCOMYCIN: CPT | Performed by: STUDENT IN AN ORGANIZED HEALTH CARE EDUCATION/TRAINING PROGRAM

## 2024-03-25 PROCEDURE — 82550 ASSAY OF CK (CPK): CPT | Performed by: NURSE PRACTITIONER

## 2024-03-25 PROCEDURE — 36430 TRANSFUSION BLD/BLD COMPNT: CPT

## 2024-03-25 PROCEDURE — 99223 1ST HOSP IP/OBS HIGH 75: CPT

## 2024-03-25 PROCEDURE — 86352 CELL FUNCTION ASSAY W/STIM: CPT | Performed by: STUDENT IN AN ORGANIZED HEALTH CARE EDUCATION/TRAINING PROGRAM

## 2024-03-25 PROCEDURE — 86900 BLOOD TYPING SEROLOGIC ABO: CPT | Performed by: NURSE PRACTITIONER

## 2024-03-25 PROCEDURE — P9047 ALBUMIN (HUMAN), 25%, 50ML: HCPCS | Mod: JZ | Performed by: NURSE PRACTITIONER

## 2024-03-25 PROCEDURE — 94668 MNPJ CHEST WALL SBSQ: CPT

## 2024-03-25 PROCEDURE — 82947 ASSAY GLUCOSE BLOOD QUANT: CPT

## 2024-03-25 PROCEDURE — 2500000005 HC RX 250 GENERAL PHARMACY W/O HCPCS: Performed by: NURSE PRACTITIONER

## 2024-03-25 PROCEDURE — 86920 COMPATIBILITY TEST SPIN: CPT

## 2024-03-25 PROCEDURE — 85384 FIBRINOGEN ACTIVITY: CPT | Performed by: NURSE PRACTITIONER

## 2024-03-25 PROCEDURE — C9113 INJ PANTOPRAZOLE SODIUM, VIA: HCPCS | Performed by: STUDENT IN AN ORGANIZED HEALTH CARE EDUCATION/TRAINING PROGRAM

## 2024-03-25 PROCEDURE — 82330 ASSAY OF CALCIUM: CPT

## 2024-03-25 PROCEDURE — 2500000004 HC RX 250 GENERAL PHARMACY W/ HCPCS (ALT 636 FOR OP/ED): Performed by: STUDENT IN AN ORGANIZED HEALTH CARE EDUCATION/TRAINING PROGRAM

## 2024-03-25 PROCEDURE — 84132 ASSAY OF SERUM POTASSIUM: CPT | Performed by: NURSE PRACTITIONER

## 2024-03-25 PROCEDURE — 93970 EXTREMITY STUDY: CPT | Performed by: SURGERY

## 2024-03-25 PROCEDURE — 85610 PROTHROMBIN TIME: CPT | Performed by: NURSE PRACTITIONER

## 2024-03-25 PROCEDURE — 99291 CRITICAL CARE FIRST HOUR: CPT | Performed by: STUDENT IN AN ORGANIZED HEALTH CARE EDUCATION/TRAINING PROGRAM

## 2024-03-25 PROCEDURE — 2020000001 HC ICU ROOM DAILY

## 2024-03-25 PROCEDURE — 80202 ASSAY OF VANCOMYCIN: CPT

## 2024-03-25 PROCEDURE — 99291 CRITICAL CARE FIRST HOUR: CPT | Performed by: NURSE PRACTITIONER

## 2024-03-25 PROCEDURE — 37799 UNLISTED PX VASCULAR SURGERY: CPT | Performed by: NURSE PRACTITIONER

## 2024-03-25 PROCEDURE — 71045 X-RAY EXAM CHEST 1 VIEW: CPT

## 2024-03-25 PROCEDURE — P9040 RBC LEUKOREDUCED IRRADIATED: HCPCS

## 2024-03-25 PROCEDURE — 93970 EXTREMITY STUDY: CPT

## 2024-03-25 PROCEDURE — 94645 CONT INHLJ TX EACH ADDL HOUR: CPT

## 2024-03-25 PROCEDURE — 2500000004 HC RX 250 GENERAL PHARMACY W/ HCPCS (ALT 636 FOR OP/ED): Mod: JZ | Performed by: STUDENT IN AN ORGANIZED HEALTH CARE EDUCATION/TRAINING PROGRAM

## 2024-03-25 PROCEDURE — 83690 ASSAY OF LIPASE: CPT | Performed by: NURSE PRACTITIONER

## 2024-03-25 PROCEDURE — 71045 X-RAY EXAM CHEST 1 VIEW: CPT | Performed by: RADIOLOGY

## 2024-03-25 PROCEDURE — P9047 ALBUMIN (HUMAN), 25%, 50ML: HCPCS | Mod: JZ | Performed by: STUDENT IN AN ORGANIZED HEALTH CARE EDUCATION/TRAINING PROGRAM

## 2024-03-25 PROCEDURE — 2500000005 HC RX 250 GENERAL PHARMACY W/O HCPCS: Performed by: STUDENT IN AN ORGANIZED HEALTH CARE EDUCATION/TRAINING PROGRAM

## 2024-03-25 PROCEDURE — 93970 EXTREMITY STUDY: CPT | Performed by: INTERNAL MEDICINE

## 2024-03-25 PROCEDURE — 37799 UNLISTED PX VASCULAR SURGERY: CPT

## 2024-03-25 PROCEDURE — 83735 ASSAY OF MAGNESIUM: CPT | Performed by: NURSE PRACTITIONER

## 2024-03-25 PROCEDURE — 2500000001 HC RX 250 WO HCPCS SELF ADMINISTERED DRUGS (ALT 637 FOR MEDICARE OP): Performed by: NURSE PRACTITIONER

## 2024-03-25 PROCEDURE — 85520 HEPARIN ASSAY: CPT | Performed by: NURSE PRACTITIONER

## 2024-03-25 PROCEDURE — 90945 DIALYSIS ONE EVALUATION: CPT | Performed by: INTERNAL MEDICINE

## 2024-03-25 PROCEDURE — 85027 COMPLETE CBC AUTOMATED: CPT | Performed by: NURSE PRACTITIONER

## 2024-03-25 RX ORDER — ALBUMIN HUMAN 250 G/1000ML
25 SOLUTION INTRAVENOUS EVERY 6 HOURS
Status: COMPLETED | OUTPATIENT
Start: 2024-03-25 | End: 2024-03-26

## 2024-03-25 RX ORDER — OXYMETAZOLINE HCL 0.05 %
2 SPRAY, NON-AEROSOL (ML) NASAL EVERY 12 HOURS
Status: COMPLETED | OUTPATIENT
Start: 2024-03-25 | End: 2024-03-26

## 2024-03-25 RX ADMIN — ALBUMIN HUMAN 25 G: 0.25 SOLUTION INTRAVENOUS at 20:12

## 2024-03-25 RX ADMIN — SODIUM CHLORIDE 0.1 MCG/KG/MIN: 9 INJECTION, SOLUTION INTRAVENOUS at 21:59

## 2024-03-25 RX ADMIN — MUPIROCIN: 20 OINTMENT TOPICAL at 20:11

## 2024-03-25 RX ADMIN — CALCIUM CHLORIDE, MAGNESIUM CHLORIDE, DEXTROSE MONOHYDRATE, LACTIC ACID, SODIUM CHLORIDE, SODIUM BICARBONATE AND POTASSIUM CHLORIDE 2400 ML/HR: 3.68; 3.05; 22; 5.4; 6.46; 3.09; .314 INJECTION INTRAVENOUS at 03:00

## 2024-03-25 RX ADMIN — Medication 100 MCG/HR: at 13:01

## 2024-03-25 RX ADMIN — MUPIROCIN: 20 OINTMENT TOPICAL at 09:28

## 2024-03-25 RX ADMIN — CALCIUM CHLORIDE, MAGNESIUM CHLORIDE, DEXTROSE MONOHYDRATE, LACTIC ACID, SODIUM CHLORIDE, SODIUM BICARBONATE AND POTASSIUM CHLORIDE 2400 ML/HR: 3.68; 3.05; 22; 5.4; 6.46; 3.09; .314 INJECTION INTRAVENOUS at 05:43

## 2024-03-25 RX ADMIN — ALBUMIN HUMAN 25 G: 0.25 SOLUTION INTRAVENOUS at 15:42

## 2024-03-25 RX ADMIN — PIPERACILLIN SODIUM AND TAZOBACTAM SODIUM 3.38 G: 3; .375 INJECTION, SOLUTION INTRAVENOUS at 05:11

## 2024-03-25 RX ADMIN — CALCIUM CHLORIDE, MAGNESIUM CHLORIDE, DEXTROSE MONOHYDRATE, LACTIC ACID, SODIUM CHLORIDE, SODIUM BICARBONATE AND POTASSIUM CHLORIDE 2400 ML/HR: 3.68; 3.05; 22; 5.4; 6.46; 3.09; .314 INJECTION INTRAVENOUS at 15:56

## 2024-03-25 RX ADMIN — HYDROCORTISONE SODIUM SUCCINATE 50 MG: 100 INJECTION, POWDER, FOR SOLUTION INTRAMUSCULAR; INTRAVENOUS at 16:07

## 2024-03-25 RX ADMIN — AMIODARONE HYDROCHLORIDE 0.5 MG/MIN: 1.8 INJECTION, SOLUTION INTRAVENOUS at 05:31

## 2024-03-25 RX ADMIN — PANTOPRAZOLE SODIUM 40 MG: 40 INJECTION, POWDER, FOR SOLUTION INTRAVENOUS at 20:12

## 2024-03-25 RX ADMIN — VANCOMYCIN HYDROCHLORIDE 1000 MG: 1 INJECTION, SOLUTION INTRAVENOUS at 17:48

## 2024-03-25 RX ADMIN — HEPARIN SODIUM 2300 UNITS/HR: 10000 INJECTION, SOLUTION INTRAVENOUS at 16:35

## 2024-03-25 RX ADMIN — Medication 100 MCG/HR: at 01:56

## 2024-03-25 RX ADMIN — VASOPRESSIN 0.04 UNITS/MIN: 0.2 INJECTION INTRAVENOUS at 19:38

## 2024-03-25 RX ADMIN — PIPERACILLIN SODIUM AND TAZOBACTAM SODIUM 3.38 G: 3; .375 INJECTION, SOLUTION INTRAVENOUS at 22:38

## 2024-03-25 RX ADMIN — CALCIUM CHLORIDE, MAGNESIUM CHLORIDE, DEXTROSE MONOHYDRATE, LACTIC ACID, SODIUM CHLORIDE, SODIUM BICARBONATE AND POTASSIUM CHLORIDE 2400 ML/HR: 3.68; 3.05; 22; 5.4; 6.46; 3.09; .314 INJECTION INTRAVENOUS at 05:42

## 2024-03-25 RX ADMIN — NASAL DECONGESTANT 2 SPRAY: 0.05 SPRAY NASAL at 09:27

## 2024-03-25 RX ADMIN — HEPARIN SODIUM 2300 UNITS/HR: 10000 INJECTION, SOLUTION INTRAVENOUS at 05:31

## 2024-03-25 RX ADMIN — VASOPRESSIN 0.04 UNITS/MIN: 0.2 INJECTION INTRAVENOUS at 11:43

## 2024-03-25 RX ADMIN — CALCIUM CHLORIDE, MAGNESIUM CHLORIDE, DEXTROSE MONOHYDRATE, LACTIC ACID, SODIUM CHLORIDE, SODIUM BICARBONATE AND POTASSIUM CHLORIDE 2400 ML/HR: 3.68; 3.05; 22; 5.4; 6.46; 3.09; .314 INJECTION INTRAVENOUS at 02:59

## 2024-03-25 RX ADMIN — CALCIUM CHLORIDE, MAGNESIUM CHLORIDE, DEXTROSE MONOHYDRATE, LACTIC ACID, SODIUM CHLORIDE, SODIUM BICARBONATE AND POTASSIUM CHLORIDE 2400 ML/HR: 3.68; 3.05; 22; 5.4; 6.46; 3.09; .314 INJECTION INTRAVENOUS at 02:58

## 2024-03-25 RX ADMIN — Medication 0.05 MCG/KG/MIN: at 10:26

## 2024-03-25 RX ADMIN — CALCIUM CHLORIDE, MAGNESIUM CHLORIDE, DEXTROSE MONOHYDRATE, LACTIC ACID, SODIUM CHLORIDE, SODIUM BICARBONATE AND POTASSIUM CHLORIDE 2400 ML/HR: 3.68; 3.05; 22; 5.4; 6.46; 3.09; .314 INJECTION INTRAVENOUS at 05:41

## 2024-03-25 RX ADMIN — EPINEPHRINE 0.1 MCG/KG/MIN: 1 INJECTION INTRAMUSCULAR; INTRAVENOUS; SUBCUTANEOUS at 17:30

## 2024-03-25 RX ADMIN — Medication 1 APPLICATION: at 04:00

## 2024-03-25 RX ADMIN — Medication 0.05 MCG/KG/MIN: at 08:08

## 2024-03-25 RX ADMIN — HYDROCORTISONE SODIUM SUCCINATE 50 MG: 100 INJECTION, POWDER, FOR SOLUTION INTRAMUSCULAR; INTRAVENOUS at 01:56

## 2024-03-25 RX ADMIN — VASOPRESSIN 0.06 UNITS/MIN: 0.2 INJECTION INTRAVENOUS at 05:37

## 2024-03-25 RX ADMIN — Medication 50 MCG/HR: at 22:17

## 2024-03-25 RX ADMIN — Medication 0.05 MCG/KG/MIN: at 14:20

## 2024-03-25 RX ADMIN — Medication 1 APPLICATION: at 21:00

## 2024-03-25 RX ADMIN — PIPERACILLIN SODIUM AND TAZOBACTAM SODIUM 3.38 G: 3; .375 INJECTION, SOLUTION INTRAVENOUS at 11:14

## 2024-03-25 RX ADMIN — PANTOPRAZOLE SODIUM 40 MG: 40 INJECTION, POWDER, FOR SOLUTION INTRAVENOUS at 09:28

## 2024-03-25 RX ADMIN — CALCIUM CHLORIDE, MAGNESIUM CHLORIDE, DEXTROSE MONOHYDRATE, LACTIC ACID, SODIUM CHLORIDE, SODIUM BICARBONATE AND POTASSIUM CHLORIDE 2400 ML/HR: 3.68; 3.05; 22; 5.4; 6.46; 3.09; .314 INJECTION INTRAVENOUS at 09:20

## 2024-03-25 RX ADMIN — KETAMINE HYDROCHLORIDE 1.1 MG/KG/HR: 10 INJECTION INTRAMUSCULAR; INTRAVENOUS at 02:47

## 2024-03-25 RX ADMIN — NASAL DECONGESTANT 2 SPRAY: 0.05 SPRAY NASAL at 20:11

## 2024-03-25 RX ADMIN — Medication: at 20:00

## 2024-03-25 RX ADMIN — INSULIN HUMAN 1 UNITS/HR: 1 INJECTION, SOLUTION INTRAVENOUS at 18:12

## 2024-03-25 RX ADMIN — HYDROCORTISONE SODIUM SUCCINATE 50 MG: 100 INJECTION, POWDER, FOR SOLUTION INTRAMUSCULAR; INTRAVENOUS at 08:28

## 2024-03-25 RX ADMIN — Medication 1 APPLICATION: at 10:11

## 2024-03-25 RX ADMIN — ALBUMIN HUMAN 25 G: 0.25 SOLUTION INTRAVENOUS at 09:29

## 2024-03-25 RX ADMIN — Medication 0.05 MCG/KG/MIN: at 22:00

## 2024-03-25 RX ADMIN — Medication 1 APPLICATION: at 16:04

## 2024-03-25 RX ADMIN — VANCOMYCIN HYDROCHLORIDE 1000 MG: 1 INJECTION, SOLUTION INTRAVENOUS at 05:11

## 2024-03-25 RX ADMIN — PIPERACILLIN SODIUM AND TAZOBACTAM SODIUM 3.38 G: 3; .375 INJECTION, SOLUTION INTRAVENOUS at 16:49

## 2024-03-25 ASSESSMENT — PAIN - FUNCTIONAL ASSESSMENT
PAIN_FUNCTIONAL_ASSESSMENT: CPOT (CRITICAL CARE PAIN OBSERVATION TOOL)

## 2024-03-25 NOTE — CARE PLAN
Problem: Safety - Medical Restraint  Goal: Remains free of injury from restraints (Restraint for Interference with Medical Device)  Outcome: Progressing  Goal: Free from restraint(s) (Restraint for Interference with Medical Device)  Outcome: Progressing    The clinical goals for the shift include remain hemodynamically stable during shift while weaning pressors (MAP >65).    Patient continues to have labile bp during the shift. Vaso titrated down to 0.04 units/min and levo to 0.11 mcg/kg/min (Maps 62-70). Sedation weaned down to assess neuro status.

## 2024-03-25 NOTE — PROCEDURES
I evaluated the patient during while s/he was receiving CVVH    BP: 118/49  BFR: 200  Replacement fluid: Prismasol 4K/2.5Ca  Flow rate: 800/pump/800/filter/800    Plan: Continue CVVH until BP stable enough for IHD or renal function recovers.I evaluated the patient during while s/he was receiving CVVH

## 2024-03-25 NOTE — PROGRESS NOTES
Occupational Therapy                 Therapy Communication Note    Patient Name: Jason Hollins  MRN: [id number]  Today's Date: 3/25/2024     Discipline: Occupational Therapy    Missed Visit Reason: Missed Visit Reason: Patient placed on medical hold (Per RN no command follow, pt is intubated and sedated, requires pressor support. will reattempt as schedule permits)    Missed Time: Attempt    Comment:

## 2024-03-25 NOTE — PROGRESS NOTES
"    Department of Plastic and Reconstructive Surgery  Daily Progress Note    Patient Name: Jason Hollins  MRN: [id number]  Date:  03/25/24     Subjective  Found resting in bed critically ill in CTICU. Remains intubated, sedated, CRRT.    Overnight Events  NAOE    Objective    Vital Signs  /50   Pulse 69   Temp 36.2 °C (97.2 °F) (Esophageal)   Resp 24   Ht 1.778 m (5' 10\")   Wt 140 kg (308 lb 13.8 oz)   SpO2 94%   BMI 44.32 kg/m²      Physical Exam   Constitutional: Intubated and sedated  ENMT: moist mucous membranes, ETT in place, NGT in place  Cardiovascular: RRR on telemetry monitor  Respiratory/Thorax: ETT in place with ventilator support 50% FiO2  Gastrointestinal: abdomen soft, non distended  Genitourinary: CVVHD currently running   Musculoskeletal: Sedated/paralyzed  Extremities: Generalized edema, Right 4Fr CFA and 7Fr CFV sheath in place  RLE: right thigh edematous, large, no bruising noted, soft and compressible, incisional wound vac in place, holding suction at -75 mmHG, no leak or alarm present, ANDERSON drain x 3 with dark serous output  BLE neurovascular intact, cap refill < 2 sec, +df/pf, DP/PT/ radial pulses 2+, no drainage noted.   Neurological:  HANNAH, intubated and sedated  Psychological: HANNAH, intubated and sedated  Skin: Warm, dry, intact    Diagnostics   Results for orders placed or performed during the hospital encounter of 03/05/24 (from the past 24 hour(s))   Blood Gas Arterial Full Panel   Result Value Ref Range    POCT pH, Arterial 7.31 (L) 7.38 - 7.42 pH    POCT pCO2, Arterial 33 (L) 38 - 42 mm Hg    POCT pO2, Arterial 99 (H) 85 - 95 mm Hg    POCT SO2, Arterial 99 94 - 100 %    POCT Oxy Hemoglobin, Arterial 97.1 94.0 - 98.0 %    POCT Hematocrit Calculated, Arterial 21.0 (L) 41.0 - 52.0 %    POCT Sodium, Arterial 134 (L) 136 - 145 mmol/L    POCT Potassium, Arterial 3.5 3.5 - 5.3 mmol/L    POCT Chloride, Arterial      POCT Ionized Calcium, Arterial 1.10 1.10 - 1.33 mmol/L    POCT Glucose, " Arterial 137 (H) 74 - 99 mg/dL    POCT Lactate, Arterial 5.8 (HH) 0.4 - 2.0 mmol/L    POCT Base Excess, Arterial -8.8 (L) -2.0 - 3.0 mmol/L    POCT HCO3 Calculated, Arterial 16.6 (L) 22.0 - 26.0 mmol/L    POCT Hemoglobin, Arterial 6.9 (L) 13.5 - 17.5 g/dL    POCT Anion Gap, Arterial      Patient Temperature 37.0 degrees Celsius    FiO2 50 %   CBC   Result Value Ref Range    WBC 14.4 (H) 4.4 - 11.3 x10*3/uL    nRBC 7.1 (H) 0.0 - 0.0 /100 WBCs    RBC 2.58 (L) 4.50 - 5.90 x10*6/uL    Hemoglobin 7.6 (L) 13.5 - 17.5 g/dL    Hematocrit 22.7 (L) 41.0 - 52.0 %    MCV 88 80 - 100 fL    MCH 29.5 26.0 - 34.0 pg    MCHC 33.5 32.0 - 36.0 g/dL    RDW 16.2 (H) 11.5 - 14.5 %    Platelets 77 (L) 150 - 450 x10*3/uL   Magnesium   Result Value Ref Range    Magnesium 1.94 1.60 - 2.40 mg/dL   Renal Function Panel   Result Value Ref Range    Glucose 157 (H) 74 - 99 mg/dL    Sodium 134 (L) 136 - 145 mmol/L    Potassium 3.9 3.5 - 5.3 mmol/L    Chloride 97 (L) 98 - 107 mmol/L    Bicarbonate 19 (L) 21 - 32 mmol/L    Anion Gap 22 (H) 10 - 20 mmol/L    Urea Nitrogen 21 6 - 23 mg/dL    Creatinine 2.36 (H) 0.50 - 1.30 mg/dL    eGFR 30 (L) >60 mL/min/1.73m*2    Calcium 8.6 8.6 - 10.6 mg/dL    Phosphorus 3.5 2.5 - 4.9 mg/dL    Albumin 3.6 3.4 - 5.0 g/dL   Calcium, ionized   Result Value Ref Range    POCT Calcium, Ionized 1.13 1.1 - 1.33 mmol/L   POCT GLUCOSE   Result Value Ref Range    POCT Glucose 151 (H) 74 - 99 mg/dL   Prepare RBC: 1 Units   Result Value Ref Range    PRODUCT CODE O9465L05     Unit Number [id number]     Unit ABO O     Unit RH NEG     XM INTEP COMP     Dispense Status TR     Blood Expiration Date March 28, 2024 23:59 EDT     PRODUCT BLOOD TYPE 9500     UNIT VOLUME 280    Heparin Assay, UFH   Result Value Ref Range    Heparin Unfractionated 0.3 See Comment Below for Therapeutic Ranges IU/mL   POCT GLUCOSE   Result Value Ref Range    POCT Glucose 155 (H) 74 - 99 mg/dL   Blood Gas Arterial Full Panel   Result Value Ref Range     POCT pH, Arterial 7.29 (L) 7.38 - 7.42 pH    POCT pCO2, Arterial 40 38 - 42 mm Hg    POCT pO2, Arterial 126 (H) 85 - 95 mm Hg    POCT SO2, Arterial 98 94 - 100 %    POCT Oxy Hemoglobin, Arterial 96.0 94.0 - 98.0 %    POCT Hematocrit Calculated, Arterial 26.0 (L) 41.0 - 52.0 %    POCT Sodium, Arterial 130 (L) 136 - 145 mmol/L    POCT Potassium, Arterial 4.4 3.5 - 5.3 mmol/L    POCT Chloride, Arterial 96 (L) 98 - 107 mmol/L    POCT Ionized Calcium, Arterial 1.20 1.10 - 1.33 mmol/L    POCT Glucose, Arterial 146 (H) 74 - 99 mg/dL    POCT Lactate, Arterial 6.0 (HH) 0.4 - 2.0 mmol/L    POCT Base Excess, Arterial -6.9 (L) -2.0 - 3.0 mmol/L    POCT HCO3 Calculated, Arterial 19.2 (L) 22.0 - 26.0 mmol/L    POCT Hemoglobin, Arterial 8.6 (L) 13.5 - 17.5 g/dL    POCT Anion Gap, Arterial 19 10 - 25 mmo/L    Patient Temperature 37.0 degrees Celsius    FiO2 50 %   CBC   Result Value Ref Range    WBC 17.7 (H) 4.4 - 11.3 x10*3/uL    nRBC 6.9 (H) 0.0 - 0.0 /100 WBCs    RBC 2.89 (L) 4.50 - 5.90 x10*6/uL    Hemoglobin 8.2 (L) 13.5 - 17.5 g/dL    Hematocrit 25.1 (L) 41.0 - 52.0 %    MCV 87 80 - 100 fL    MCH 28.4 26.0 - 34.0 pg    MCHC 32.7 32.0 - 36.0 g/dL    RDW 16.0 (H) 11.5 - 14.5 %    Platelets 80 (L) 150 - 450 x10*3/uL   Heparin Assay, UFH   Result Value Ref Range    Heparin Unfractionated 0.3 See Comment Below for Therapeutic Ranges IU/mL   POCT GLUCOSE   Result Value Ref Range    POCT Glucose 158 (H) 74 - 99 mg/dL   POCT GLUCOSE   Result Value Ref Range    POCT Glucose 145 (H) 74 - 99 mg/dL   Vancomycin   Result Value Ref Range    Vancomycin 17.0 5.0 - 20.0 ug/mL   CBC   Result Value Ref Range    WBC 18.7 (H) 4.4 - 11.3 x10*3/uL    nRBC 4.8 (H) 0.0 - 0.0 /100 WBCs    RBC 2.78 (L) 4.50 - 5.90 x10*6/uL    Hemoglobin 8.2 (L) 13.5 - 17.5 g/dL    Hematocrit 22.8 (L) 41.0 - 52.0 %    MCV 82 80 - 100 fL    MCH 29.5 26.0 - 34.0 pg    MCHC 36.0 32.0 - 36.0 g/dL    RDW 15.9 (H) 11.5 - 14.5 %    Platelets 77 (L) 150 - 450 x10*3/uL    Coagulation Screen   Result Value Ref Range    Protime 21.7 (H) 9.8 - 12.8 seconds    INR 1.9 (H) 0.9 - 1.1    aPTT 184 (HH) 27 - 38 seconds   Magnesium   Result Value Ref Range    Magnesium 2.15 1.60 - 2.40 mg/dL   Renal Function Panel   Result Value Ref Range    Glucose 144 (H) 74 - 99 mg/dL    Sodium 133 (L) 136 - 145 mmol/L    Potassium 4.1 3.5 - 5.3 mmol/L    Chloride 97 (L) 98 - 107 mmol/L    Bicarbonate 20 (L) 21 - 32 mmol/L    Anion Gap 20 10 - 20 mmol/L    Urea Nitrogen 21 6 - 23 mg/dL    Creatinine 2.22 (H) 0.50 - 1.30 mg/dL    eGFR 33 (L) >60 mL/min/1.73m*2    Calcium 8.5 (L) 8.6 - 10.6 mg/dL    Phosphorus 3.2 2.5 - 4.9 mg/dL    Albumin 3.4 3.4 - 5.0 g/dL   Calcium, ionized   Result Value Ref Range    POCT Calcium, Ionized 1.10 1.1 - 1.33 mmol/L   Blood Gas Arterial Full Panel   Result Value Ref Range    POCT pH, Arterial 7.35 (L) 7.38 - 7.42 pH    POCT pCO2, Arterial 34 (L) 38 - 42 mm Hg    POCT pO2, Arterial 103 (H) 85 - 95 mm Hg    POCT SO2, Arterial 98 94 - 100 %    POCT Oxy Hemoglobin, Arterial 95.6 94.0 - 98.0 %    POCT Hematocrit Calculated, Arterial 26.0 (L) 41.0 - 52.0 %    POCT Sodium, Arterial 130 (L) 136 - 145 mmol/L    POCT Potassium, Arterial 4.3 3.5 - 5.3 mmol/L    POCT Chloride, Arterial 97 (L) 98 - 107 mmol/L    POCT Ionized Calcium, Arterial 1.11 1.10 - 1.33 mmol/L    POCT Glucose, Arterial 133 (H) 74 - 99 mg/dL    POCT Lactate, Arterial 5.8 (HH) 0.4 - 2.0 mmol/L    POCT Base Excess, Arterial -6.2 (L) -2.0 - 3.0 mmol/L    POCT HCO3 Calculated, Arterial 18.8 (L) 22.0 - 26.0 mmol/L    POCT Hemoglobin, Arterial 8.5 (L) 13.5 - 17.5 g/dL    POCT Anion Gap, Arterial 19 10 - 25 mmo/L    Patient Temperature 37.0 degrees Celsius    FiO2 50 %   Vancomycin, Trough   Result Value Ref Range    Vancomycin, Trough 16.0 5.0 - 20.0 ug/mL   Heparin Assay, UFH   Result Value Ref Range    Heparin Unfractionated 0.4 See Comment Below for Therapeutic Ranges IU/mL   Hepatic Function Panel   Result Value Ref  Range    Albumin 3.4 3.4 - 5.0 g/dL    Bilirubin, Total 8.8 (H) 0.0 - 1.2 mg/dL    Bilirubin, Direct 5.6 (H) 0.0 - 0.3 mg/dL    Alkaline Phosphatase 123 33 - 136 U/L    ALT 1,524 (H) 10 - 52 U/L    AST 1,253 (H) 9 - 39 U/L    Total Protein 5.0 (L) 6.4 - 8.2 g/dL   Lipase   Result Value Ref Range    Lipase 59 9 - 82 U/L   Creatine Kinase   Result Value Ref Range    Creatine Kinase 741 (H) 0 - 325 U/L   CBC   Result Value Ref Range    WBC 20.7 (H) 4.4 - 11.3 x10*3/uL    nRBC 4.9 (H) 0.0 - 0.0 /100 WBCs    RBC 2.89 (L) 4.50 - 5.90 x10*6/uL    Hemoglobin 8.2 (L) 13.5 - 17.5 g/dL    Hematocrit 23.7 (L) 41.0 - 52.0 %    MCV 82 80 - 100 fL    MCH 28.4 26.0 - 34.0 pg    MCHC 34.6 32.0 - 36.0 g/dL    RDW 15.9 (H) 11.5 - 14.5 %    Platelets 83 (L) 150 - 450 x10*3/uL   POCT GLUCOSE   Result Value Ref Range    POCT Glucose 171 (H) 74 - 99 mg/dL   POCT GLUCOSE   Result Value Ref Range    POCT Glucose 147 (H) 74 - 99 mg/dL   POCT GLUCOSE   Result Value Ref Range    POCT Glucose 149 (H) 74 - 99 mg/dL     Current Medications  Scheduled medications  albumin human, 25 g, intravenous, q6h  hydrocortisone sodium succinate, 50 mg, intravenous, q8h  mupirocin, , Each Nostril, BID  oxygen, , inhalation, Continuous - Inhalation  oxymetazoline, 2 spray, Each Nostril, q12h  pantoprazole, 40 mg, intravenous, BID  piperacillin-tazobactam, 3.375 g, intravenous, q6h  vancomycin, 1,000 mg, intravenous, q12h  white petrolatum-mineral oiL, 1 Application, Both Eyes, q6h      Continuous medications  EPINEPHrine, 0-2 mcg/kg/min (Dosing Weight), Last Rate: 0.1 mcg/kg/min (03/25/24 1000)  epoprostenol, 0.05 mcg/kg/min (Ideal), Last Rate: 0.05 mcg/kg/min (03/25/24 1026)  fentaNYL,  mcg/hr, Last Rate: 100 mcg/hr (03/25/24 1000)  heparin, 0-4,000 Units/hr, Last Rate: 2,300 Units/hr (03/25/24 1000)  insulin regular infusion for cardiac surgery, 0-15 Units/hr, Last Rate: 1 Units/hr (03/25/24 1000)  ketamine (Ketalar) 1,000 mg in 100 mL (10 mg/mL)  infusion, 0-2 mg/kg/hr (Dosing Weight), Last Rate: 0.5 mg/kg/hr (03/25/24 1000)  lactated Ringer's, 5 mL/hr, Last Rate: 5 mL/hr (03/25/24 1000)  lactated Ringer's, 5 mL/hr, Last Rate: 5 mL/hr (03/25/24 1000)  norepinephrine, 0.1-3 mcg/kg/min (Dosing Weight), Last Rate: 0.13 mcg/kg/min (03/25/24 1000)  PrismaSol 4/2.5, 2,400 mL/hr, Last Rate: 2,400 mL/hr (03/25/24 0920)  vasopressin, 0.01-0.06 Units/min, Last Rate: 0.05 Units/min (03/25/24 1000)      PRN medications  PRN medications: alteplase, calcium gluconate, calcium gluconate, dextrose, dextrose, dextrose **OR** glucagon, glucagon, insulin regular, magnesium sulfate, magnesium sulfate, potassium chloride, potassium chloride, vancomycin     Assessment  63 y.o. 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 Hawkins and Dr. Smith on 3/5/24.  Post operative course was uncomplicated and patient was on RNF until 3/20 for prolonged bed rest to avoid hip flexion to maintain flap integrity.  Patient was on prophylactic lovenox while on bedrest. Per nursing report, patient was getting to the side of the bed today to get up and walk when he had sudden chest heaviness. Pre-cardiac arrest EKG with signs of anterior ischemia. Nursing was called by family because patient went unresponsive.  ACLS was immediately started. ROSC was achieved after 1 round of CPR, however patient quickly decompensated into PEA.  Airway was secured.  Multiple rounds of CPR were completed with intermittent ROSC prior to transport to CTICU.  Patient arrived to CTICU under SICU care with active CPR underway.  Primary concern for STEMI vs massive PE.  Bedside POCUS TTE with biventricular failure and dilated RV.  RV septal bowing. Decided to give TPA to treat massive PE.  After several minutes of CPR patient  achieved  sustained ROSC.  See code documentation note for further details.     3/20: Now s/p CPR with ROSC after TPA, overnight started on heparin, epo, increased pressor requirements, increased swelling at flap site.   3/21: MTP transfusions. Return to OR for exploration of R Flap site now s/p Exploration of right thigh wound evacuation of hematoma. Suture ligation of multiple bleeding vessel and several points in gluteal area with Dr. Smith.    Plan/Recommendations  S/p Exploration of right thigh wound evacuation of hematoma. Suture ligation of multiple bleeding vessel and several points in gluteal area:  A/P:   - Remains critically ill in CTICU on high dose pressors (Levo/Vaso/Epi/AT2)  - Maintain incisional wound vac to right gluteal/thigh area x10-14 days post-op       ·  Settings: -75mmHg low continuous suction        ·  Notify plastic surgery with any concerns of leak or obstruction   - OK to resume low dose heparin gtt from plastic standpoint, continue monitoring heparin assay. Management per SICU  - Monitor right thigh for worsening s/s of active bleeding  - Continue ANDERSON drain care per nursing      ·  Strip drain tubing TID and PRN     ·  Monitor and record output q8h      ·  Keep drain sites C/D/I with daily drain dressing changes      ·  Notify plastics if ANDERSON drain output exceeds > 100 ml/hr in one drain or > 300 ml/hr collectively  - Operative findings 3/21: Actively Bleeding Gluteal Arterial Perforators, Generalized Oozing, healthy Flap, EBL 6.5L (3L of which was retained Hematoma)   - Continue IV Vanc/Zosyn per ICU  - Appreciate remaining care per CTICU/SICU  - Plastics to follow     Patient and plan discussed with Dr. Jose A PA-C  Plastic and Reconstructive Surgery   Available via Epic chat, pager: [phone] or team phones: [phone]

## 2024-03-25 NOTE — PROGRESS NOTES
Physical Therapy                 Therapy Communication Note    Patient Name: Jason Hollins  MRN: [id number]  Today's Date: 3/25/2024     Discipline: Physical Therapy    Missed Visit Reason: Missed Visit Reason:  (No command following per RN.  Pt remains intubated/sedated at this time, on pressor support.  Will hold PT and re-attempt once medically appropriate.)    Missed Time: Attempt

## 2024-03-25 NOTE — PROGRESS NOTES
"Vancomycin Dosing by Pharmacy- FOLLOW UP    Jason Hollins is a 63 y.o. year old male who Pharmacy has been consulted for vancomycin dosing for surgical prophylaxis. Based on the patient's indication and renal status this patient is being dosed based on a goal trough/random level of 15-20.     Renal function is currently stable. Patient is still undergoing CVVH.    Current vancomycin dose: 1000 mg given every 12 hours    Most recent trough level: 16.0 mcg/mL    Visit Vitals  /50   Pulse 79   Temp 36.2 °C (97.2 °F) (Esophageal)   Resp 24        Lab Results   Component Value Date    CREATININE 2.22 (H) 03/25/2024    CREATININE 2.36 (H) 03/24/2024    CREATININE 2.33 (H) 03/24/2024    CREATININE 2.34 (H) 03/23/2024        Patient weight is No results found for: \"PTWEIGHT\"    No results found for: \"CULTURE\"     I/O last 3 completed shifts:  In: 9480.5 (69.2 mL/kg) [I.V.:8135.5 (59.3 mL/kg); Blood:280; NG/GT:60; IV Piggyback:1005]  Out: 3277 (23.9 mL/kg) [Urine:10 (0 mL/kg/hr); Emesis/NG output:300; Drains:960; Other:2007]  Weight: 137.1 kg   [unfilled]    Lab Results   Component Value Date    PATIENTTEMP 37.0 03/25/2024    PATIENTTEMP 37.0 03/24/2024    PATIENTTEMP 37.0 03/24/2024        Assessment/Plan    Within goal random/trough level. Tho continue current dose of 1000 mg every 12 hours  The next level will be obtained on 3/27 at AM labs. May be obtained sooner if clinically indicated.   Will continue to monitor renal function daily while on vancomycin and order serum creatinine at least every 48 hours if not already ordered.  Follow for continued vancomycin needs, clinical response, and signs/symptoms of toxicity.       HAYES SUAZO, PharmD           "

## 2024-03-25 NOTE — PROGRESS NOTES
"Jason Hollins is a 63 y.o. male on day 20 of admission presenting with Soft tissue sarcoma (CMS/HCC).    Subjective   Overnight weaned down on NE and weaned off angiotensin II.       Objective     Physical Exam    Last Recorded Vitals  Blood pressure 106/50, pulse 71, temperature 36.2 °C (97.2 °F), temperature source Esophageal, resp. rate 24, height 1.778 m (5' 10\"), weight 140 kg (308 lb 13.8 oz), SpO2 92 %.  Intake/Output last 3 Shifts:  I/O last 3 completed shifts:  In: 8409.1 (60 mL/kg) [I.V.:7114.1 (50.8 mL/kg); Blood:280; NG/GT:60; IV Piggyback:955]  Out: 4238 (30.3 mL/kg) [Emesis/NG output:200; Drains:915; Other:3123]  Weight: 140.1 kg     Relevant Results           This patient currently has cardiac telemetry ordered; if you would like to modify or discontinue the telemetry order, click here to go to the orders activity to modify/discontinue the order.  This patient has a central line   Reason for the central line remaining today? Dialysis/Hemapheresis, Hemodynamic monitoring, and Parenteral medication      This patient is intubated   Reason for patient to remain intubated today? they have continued cardiopulmonary lability/instability and they have inadequate gas-exchange without positive pressure             Assessment/Plan   Principal Problem:    Soft tissue sarcoma (CMS/HCC)  Active Problems:    Extraskeletal myxoid chondrosarcoma (CMS/HCC)    Cardiopulmonary arrest (CMS/HCC)    63 y.o. 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 Hawkins and Dr. Smith on 3/5/24.  Post operative course was uncomplicated and patient was on RNF until 3/20 for prolonged bed rest to avoid hip flexion to maintain flap integrity.  Patient was on prophylactic lovenox while on bedrest. Per nursing report, patient was getting to the " side of the bed today to get up and walk when he had sudden chest heaviness. Pre-cardiac arrest EKG with signs of anterior ischemia. Nursing was called by family because patient went unresponsive.  ACLS was immediately started. ROSC was achieved after 1 round of CPR, however patient quickly decompensated into PEA.  Airway was secured.  Multiple rounds of CPR were completed with intermittent ROSC prior to transport to CTICU.  Patient arrived to CTICU under SICU care with active CPR underway.  Primary concern for STEMI vs massive PE.  Bedside POCUS TTE with biventricular failure and dilated RV.  RV septal bowing. Decided to give TPA to treat massive PE.  After several minutes of CPR patient achieved  sustained ROSC. Subsequently taken to cath lab where angiography illustrated clean coronaries and no significant PE.      3/20: Now s/p CPR with ROSC after TPA, overnight started on heparin, epo, increased pressor requirements, increased swelling at flap site.   3/21: MTP transfusions. Return to OR for exploration of R Flap site. High pressors. On epo.    3/22 Initiated on CVVH for profound metabolic acidosis, repeat ECHO with hyperdynamic LV, normal RV function, NMB for hypoxemic respiratory failure, CT scans with pancreatitis.      I evaluated the patient with an advanced practice provider. I oversaw this patient's care, and I participated actively their clinical course.     Plan:  Neuro: sedated on ketamine/fentanyl. Continue to slowly wean sedation in the upcoming days if hemodynamics and respiratory status allow.   Cardiovascular: Undifferentiated shock, remains on significant hemodynamic support epi 0.1, NE 0.12, vaso 0.06; maintain invasive access; continue to support MAP >65mmHg. Repeat ECHO with hyperdynamic LV and relatively normal RV function. No pericardial effusion, no noted PE on angiogram, no PTX, unlikely obstructive. Central venous saturation of 80%. Unlikely cardiogenic. Continue stress dose steroids for  possible adrenal insufficiency; however will wean to every 8 hours from every 6. Hemorrhage currently controlled. Low concern for HLH from hematology consult.   Respiratory: Acute hypoxemic respiratory failure, likely multifactorial in the setting of hypervolemia. Off NMB this morning with FiO2 down to 40%, PEEP 14.   GI: NPO; PPI. Pressor requirements too high to consider enteral nutrition. Nutrition consult as anticipate initiating feeds in the next 48 hours. Persistent lactic acidosis. AST/ALT downtrending, slight uptrend in bilirubin. Continue to monitor. RUQ ultrasound relatively benign. CT head, chest, abdomen, and pelvis illustrated pancreatitis.   : Oliguric/anuric overnight- remove daniels; started on RRT for persistent metabolic acidosis. Improving.   Endo: Hx DM2. Continue insulin infusion per protocol.   Heme: acute blood loss anemia from surgical limb currently controlled; concern for recent PE (DVT scans ordered and pending), now in atrial fibrillation. Continue low intensity heparin infusion without bolus dosing and monitor for bleeding.   ID: On vancomycin/zosyn/john for broad coverage. Stop micafungin today. May complete a 5-7 day course of empiric antibiotics. Mupirocin x 5 days for MRSA swab positive. Repeat MRSA negative.   Dispo: SICU Care; family members and primary surgeon updated. Will consult palliative care today.      Due to the high probability of life threatening clinical decompensation, the patient required critical care time evaluating and managing this patient.  Critical care time included obtaining a history, examining the patient, ordering and reviewing studies, discussing, developing, and implementing a management plan, evaluating the patient's response to treatment, and discussion with other care team providers. I saw and evaluated the patient myself. I reviewed the provider's documentation and discussed the patient with the provider. Critical care time was performed exclusive of  billable procedures.     Critical Care Time: 46 minutes       I spent 46 minutes in the professional and overall care of this patient.      Yamilex Rouse MD

## 2024-03-25 NOTE — PROGRESS NOTES
"Jason Hollins is a 63 y.o. male on day 20 of admission presenting with Soft tissue sarcoma (CMS/HCC).    Subjective   Weaned off AT2  Hypotension with turns  Stable Hgb       Objective     Physical Exam  Vitals reviewed.   Constitutional:       Interventions: He is sedated, intubated and restrained.   HENT:      Head:      Comments: Edematous face/head/neck     Mouth/Throat:      Mouth: Mucous membranes are moist.      Comments: Orotracheal intubation. OG present to LIWS, bloody drainage  Eyes:      Comments: Pupils pinpoint, equal. Conjunctival edema. Scleral icterus. Subconjunctival hemorrhage   Cardiovascular:      Rate and Rhythm: Tachycardia present. Rhythm irregularly irregular.      Pulses:           Radial pulses are 1+ on the right side and 1+ on the left side.        Dorsalis pedis pulses are 1+ on the right side and 1+ on the left side.   Pulmonary:      Effort: He is intubated.      Breath sounds: Rhonchi present.      Comments: Mechanically ventilated via ETT. Scattered rhonchi, diminished breath sounds bilaterally. Equal chest rise.  Abdominal:      General: Abdomen is protuberant. Bowel sounds are absent. There is distension.      Palpations: Abdomen is soft.   Genitourinary:     Comments: Marino present, minimal urine  Skin:     General: Skin is dry.      Comments: Right flap site with wound VAC, no drainage. ANDERSON x 3 all with dark serosanguinous drainage   Neurological:      Mental Status: He is unresponsive.      GCS: GCS eye subscore is 1. GCS verbal subscore is 1. GCS motor subscore is 1.           Last Recorded Vitals  Blood pressure 106/50, pulse 79, temperature 36.2 °C (97.2 °F), temperature source Esophageal, resp. rate 24, height 1.778 m (5' 10\"), weight 140 kg (308 lb 13.8 oz), SpO2 96 %.    VS over last 24h  Heart Rate:  []   Temp:  [36.2 °C (97.2 °F)-37.5 °C (99.5 °F)]   Resp:  [13-25]   BP: (106-107)/(48-50)   Weight:  [140 kg (308 lb 13.8 oz)]   SpO2:  [89 %-100 %]    Intake/Output last " 3 Shifts:  I/O last 3 completed shifts:  In: 8409.1 (60 mL/kg) [I.V.:7114.1 (50.8 mL/kg); Blood:280; NG/GT:60; IV Piggyback:955]  Out: 4238 (30.3 mL/kg) [Emesis/NG output:200; Drains:915; Other:3123]  Weight: 140.1 kg       Intake/Output Summary (Last 24 hours) at 3/25/2024 0742  Last data filed at 3/25/2024 0700  Gross per 24 hour   Intake 5339.31 ml   Output 3266 ml   Net 2073.31 ml          Relevant Results  Results for orders placed or performed during the hospital encounter of 03/05/24 (from the past 24 hour(s))   Blood Gas Arterial Full Panel   Result Value Ref Range    POCT pH, Arterial 7.32 (L) 7.38 - 7.42 pH    POCT pCO2, Arterial 38 38 - 42 mm Hg    POCT pO2, Arterial 121 (H) 85 - 95 mm Hg    POCT SO2, Arterial 99 94 - 100 %    POCT Oxy Hemoglobin, Arterial 95.8 94.0 - 98.0 %    POCT Hematocrit Calculated, Arterial 24.0 (L) 41.0 - 52.0 %    POCT Sodium, Arterial 130 (L) 136 - 145 mmol/L    POCT Potassium, Arterial 3.9 3.5 - 5.3 mmol/L    POCT Chloride, Arterial 99 98 - 107 mmol/L    POCT Ionized Calcium, Arterial 1.08 (L) 1.10 - 1.33 mmol/L    POCT Glucose, Arterial 171 (H) 74 - 99 mg/dL    POCT Lactate, Arterial 5.7 (HH) 0.4 - 2.0 mmol/L    POCT Base Excess, Arterial -6.0 (L) -2.0 - 3.0 mmol/L    POCT HCO3 Calculated, Arterial 19.6 (L) 22.0 - 26.0 mmol/L    POCT Hemoglobin, Arterial 7.9 (L) 13.5 - 17.5 g/dL    POCT Anion Gap, Arterial 15 10 - 25 mmo/L    Patient Temperature 37.0 degrees Celsius    FiO2 50 %   Heparin Assay, UFH   Result Value Ref Range    Heparin Unfractionated 0.2 See Comment Below for Therapeutic Ranges IU/mL   POCT GLUCOSE   Result Value Ref Range    POCT Glucose 178 (H) 74 - 99 mg/dL   Blood Gas Arterial Full Panel   Result Value Ref Range    POCT pH, Arterial 7.31 (L) 7.38 - 7.42 pH    POCT pCO2, Arterial 33 (L) 38 - 42 mm Hg    POCT pO2, Arterial 99 (H) 85 - 95 mm Hg    POCT SO2, Arterial 99 94 - 100 %    POCT Oxy Hemoglobin, Arterial 97.1 94.0 - 98.0 %    POCT Hematocrit  Calculated, Arterial 21.0 (L) 41.0 - 52.0 %    POCT Sodium, Arterial 134 (L) 136 - 145 mmol/L    POCT Potassium, Arterial 3.5 3.5 - 5.3 mmol/L    POCT Chloride, Arterial      POCT Ionized Calcium, Arterial 1.10 1.10 - 1.33 mmol/L    POCT Glucose, Arterial 137 (H) 74 - 99 mg/dL    POCT Lactate, Arterial 5.8 (HH) 0.4 - 2.0 mmol/L    POCT Base Excess, Arterial -8.8 (L) -2.0 - 3.0 mmol/L    POCT HCO3 Calculated, Arterial 16.6 (L) 22.0 - 26.0 mmol/L    POCT Hemoglobin, Arterial 6.9 (L) 13.5 - 17.5 g/dL    POCT Anion Gap, Arterial      Patient Temperature 37.0 degrees Celsius    FiO2 50 %   CBC   Result Value Ref Range    WBC 14.4 (H) 4.4 - 11.3 x10*3/uL    nRBC 7.1 (H) 0.0 - 0.0 /100 WBCs    RBC 2.58 (L) 4.50 - 5.90 x10*6/uL    Hemoglobin 7.6 (L) 13.5 - 17.5 g/dL    Hematocrit 22.7 (L) 41.0 - 52.0 %    MCV 88 80 - 100 fL    MCH 29.5 26.0 - 34.0 pg    MCHC 33.5 32.0 - 36.0 g/dL    RDW 16.2 (H) 11.5 - 14.5 %    Platelets 77 (L) 150 - 450 x10*3/uL   Magnesium   Result Value Ref Range    Magnesium 1.94 1.60 - 2.40 mg/dL   Renal Function Panel   Result Value Ref Range    Glucose 157 (H) 74 - 99 mg/dL    Sodium 134 (L) 136 - 145 mmol/L    Potassium 3.9 3.5 - 5.3 mmol/L    Chloride 97 (L) 98 - 107 mmol/L    Bicarbonate 19 (L) 21 - 32 mmol/L    Anion Gap 22 (H) 10 - 20 mmol/L    Urea Nitrogen 21 6 - 23 mg/dL    Creatinine 2.36 (H) 0.50 - 1.30 mg/dL    eGFR 30 (L) >60 mL/min/1.73m*2    Calcium 8.6 8.6 - 10.6 mg/dL    Phosphorus 3.5 2.5 - 4.9 mg/dL    Albumin 3.6 3.4 - 5.0 g/dL   Calcium, ionized   Result Value Ref Range    POCT Calcium, Ionized 1.13 1.1 - 1.33 mmol/L   POCT GLUCOSE   Result Value Ref Range    POCT Glucose 151 (H) 74 - 99 mg/dL   Prepare RBC: 1 Units   Result Value Ref Range    PRODUCT CODE Z0001S26     Unit Number [id number]     Unit ABO O     Unit RH NEG     XM INTEP COMP     Dispense Status TR     Blood Expiration Date March 28, 2024 23:59 EDT     PRODUCT BLOOD TYPE 9500     UNIT VOLUME 280    Heparin  Assay, UFH   Result Value Ref Range    Heparin Unfractionated 0.3 See Comment Below for Therapeutic Ranges IU/mL   POCT GLUCOSE   Result Value Ref Range    POCT Glucose 155 (H) 74 - 99 mg/dL   Blood Gas Arterial Full Panel   Result Value Ref Range    POCT pH, Arterial 7.29 (L) 7.38 - 7.42 pH    POCT pCO2, Arterial 40 38 - 42 mm Hg    POCT pO2, Arterial 126 (H) 85 - 95 mm Hg    POCT SO2, Arterial 98 94 - 100 %    POCT Oxy Hemoglobin, Arterial 96.0 94.0 - 98.0 %    POCT Hematocrit Calculated, Arterial 26.0 (L) 41.0 - 52.0 %    POCT Sodium, Arterial 130 (L) 136 - 145 mmol/L    POCT Potassium, Arterial 4.4 3.5 - 5.3 mmol/L    POCT Chloride, Arterial 96 (L) 98 - 107 mmol/L    POCT Ionized Calcium, Arterial 1.20 1.10 - 1.33 mmol/L    POCT Glucose, Arterial 146 (H) 74 - 99 mg/dL    POCT Lactate, Arterial 6.0 (HH) 0.4 - 2.0 mmol/L    POCT Base Excess, Arterial -6.9 (L) -2.0 - 3.0 mmol/L    POCT HCO3 Calculated, Arterial 19.2 (L) 22.0 - 26.0 mmol/L    POCT Hemoglobin, Arterial 8.6 (L) 13.5 - 17.5 g/dL    POCT Anion Gap, Arterial 19 10 - 25 mmo/L    Patient Temperature 37.0 degrees Celsius    FiO2 50 %   CBC   Result Value Ref Range    WBC 17.7 (H) 4.4 - 11.3 x10*3/uL    nRBC 6.9 (H) 0.0 - 0.0 /100 WBCs    RBC 2.89 (L) 4.50 - 5.90 x10*6/uL    Hemoglobin 8.2 (L) 13.5 - 17.5 g/dL    Hematocrit 25.1 (L) 41.0 - 52.0 %    MCV 87 80 - 100 fL    MCH 28.4 26.0 - 34.0 pg    MCHC 32.7 32.0 - 36.0 g/dL    RDW 16.0 (H) 11.5 - 14.5 %    Platelets 80 (L) 150 - 450 x10*3/uL   Heparin Assay, UFH   Result Value Ref Range    Heparin Unfractionated 0.3 See Comment Below for Therapeutic Ranges IU/mL   POCT GLUCOSE   Result Value Ref Range    POCT Glucose 158 (H) 74 - 99 mg/dL   POCT GLUCOSE   Result Value Ref Range    POCT Glucose 145 (H) 74 - 99 mg/dL   Vancomycin   Result Value Ref Range    Vancomycin 17.0 5.0 - 20.0 ug/mL   CBC   Result Value Ref Range    WBC 18.7 (H) 4.4 - 11.3 x10*3/uL    nRBC 4.8 (H) 0.0 - 0.0 /100 WBCs    RBC 2.78 (L)  4.50 - 5.90 x10*6/uL    Hemoglobin 8.2 (L) 13.5 - 17.5 g/dL    Hematocrit 22.8 (L) 41.0 - 52.0 %    MCV 82 80 - 100 fL    MCH 29.5 26.0 - 34.0 pg    MCHC 36.0 32.0 - 36.0 g/dL    RDW 15.9 (H) 11.5 - 14.5 %    Platelets 77 (L) 150 - 450 x10*3/uL   Coagulation Screen   Result Value Ref Range    Protime 21.7 (H) 9.8 - 12.8 seconds    INR 1.9 (H) 0.9 - 1.1    aPTT 184 (HH) 27 - 38 seconds   Magnesium   Result Value Ref Range    Magnesium 2.15 1.60 - 2.40 mg/dL   Renal Function Panel   Result Value Ref Range    Glucose 144 (H) 74 - 99 mg/dL    Sodium 133 (L) 136 - 145 mmol/L    Potassium 4.1 3.5 - 5.3 mmol/L    Chloride 97 (L) 98 - 107 mmol/L    Bicarbonate 20 (L) 21 - 32 mmol/L    Anion Gap 20 10 - 20 mmol/L    Urea Nitrogen 21 6 - 23 mg/dL    Creatinine 2.22 (H) 0.50 - 1.30 mg/dL    eGFR 33 (L) >60 mL/min/1.73m*2    Calcium 8.5 (L) 8.6 - 10.6 mg/dL    Phosphorus 3.2 2.5 - 4.9 mg/dL    Albumin 3.4 3.4 - 5.0 g/dL   Calcium, ionized   Result Value Ref Range    POCT Calcium, Ionized 1.10 1.1 - 1.33 mmol/L   Blood Gas Arterial Full Panel   Result Value Ref Range    POCT pH, Arterial 7.35 (L) 7.38 - 7.42 pH    POCT pCO2, Arterial 34 (L) 38 - 42 mm Hg    POCT pO2, Arterial 103 (H) 85 - 95 mm Hg    POCT SO2, Arterial 98 94 - 100 %    POCT Oxy Hemoglobin, Arterial 95.6 94.0 - 98.0 %    POCT Hematocrit Calculated, Arterial 26.0 (L) 41.0 - 52.0 %    POCT Sodium, Arterial 130 (L) 136 - 145 mmol/L    POCT Potassium, Arterial 4.3 3.5 - 5.3 mmol/L    POCT Chloride, Arterial 97 (L) 98 - 107 mmol/L    POCT Ionized Calcium, Arterial 1.11 1.10 - 1.33 mmol/L    POCT Glucose, Arterial 133 (H) 74 - 99 mg/dL    POCT Lactate, Arterial 5.8 (HH) 0.4 - 2.0 mmol/L    POCT Base Excess, Arterial -6.2 (L) -2.0 - 3.0 mmol/L    POCT HCO3 Calculated, Arterial 18.8 (L) 22.0 - 26.0 mmol/L    POCT Hemoglobin, Arterial 8.5 (L) 13.5 - 17.5 g/dL    POCT Anion Gap, Arterial 19 10 - 25 mmo/L    Patient Temperature 37.0 degrees Celsius    FiO2 50 %    Vancomycin, Trough   Result Value Ref Range    Vancomycin, Trough 16.0 5.0 - 20.0 ug/mL   Heparin Assay, UFH   Result Value Ref Range    Heparin Unfractionated 0.4 See Comment Below for Therapeutic Ranges IU/mL   CBC   Result Value Ref Range    WBC 20.7 (H) 4.4 - 11.3 x10*3/uL    nRBC 4.9 (H) 0.0 - 0.0 /100 WBCs    RBC 2.89 (L) 4.50 - 5.90 x10*6/uL    Hemoglobin 8.2 (L) 13.5 - 17.5 g/dL    Hematocrit 23.7 (L) 41.0 - 52.0 %    MCV 82 80 - 100 fL    MCH 28.4 26.0 - 34.0 pg    MCHC 34.6 32.0 - 36.0 g/dL    RDW 15.9 (H) 11.5 - 14.5 %    Platelets 83 (L) 150 - 450 x10*3/uL   POCT GLUCOSE   Result Value Ref Range    POCT Glucose 171 (H) 74 - 99 mg/dL       Transthoracic Echo (TTE) Limited    Result Date: 3/21/2024   [facility], [address] [place] [address]                Tel [phone] and Fax [phone] TRANSTHORACIC ECHOCARDIOGRAM REPORT  Patient Name:      YVETTE Clinton Physician:    [id number] Derrell Negron MD Study Date:        3/21/2024            Ordering Provider:    [id number] KELLY ELISE MRN/PID:           [id number]             Fellow:               [id number] Alessandro Wise MD Accession#:        [id number]         Nurse: Date of Birth/Age: 1960 / 63 years Sonographer:          Katelyn Aguiar RDCS, RVT Gender:            M                    Additional Staff: Height:            177.80 cm            Admit Date:           2/28/2024 Weight:            100.70 kg            Admission Status:     Inpatient - STAT BSA / BMI:         2.18 m2 / 31.85      Encounter#:           [id number]                    kg/m2                                         Department Location:  [facility] Blood Pressure: 119 /55 mmHg Study Type:     TRANSTHORACIC ECHO (TTE) LIMITED Diagnosis/ICD: Cardiac arrest, cause unspecified-I46.9; Other pulmonary embolism                without acute cor pulmonale-I26.99 Indication:    Status post cardiac arrest. PE. Right heart strain. CPT Code:      Echo Limited-13890; Color Doppler-00667; Doppler Limited-51832 Patient History: Pertinent History: Myxoid chondrosarcoma. HTN. DM. HLD. Study Detail: The following Echo studies were performed: 2D, M-Mode, Doppler and               color flow. Technically challenging study due to patient lying in               supine position and body habitus. Definity used as a contrast               agent for endocardial border definition.  PHYSICIAN INTERPRETATION: Left Ventricle: The left ventricular systolic function is normal. There are no regional wall motion abnormalities. The left ventricular cavity size is normal. Left ventricular diastolic filling was not assessed. Left Atrium: The left atrium is normal in size. Right Ventricle: The right ventricle is moderately enlarged. There is low normal right ventricular systolic function. There is a slight suggestion of a Townsend's sign on the apical views suggestive of RV strain although the function is low normal. Right Atrium: The right atrium is mildly dilated. Aortic Valve: The aortic valve is trileaflet. There is trivial aortic valve regurgitation. Mitral Valve: The mitral valve is normal in structure. Mitral valve regurgitation was not assessed. Tricuspid Valve: The tricuspid valve is structurally normal. There is trace tricuspid regurgitation. The right ventricular systolic pressure is unable to be estimated. Pulmonic Valve: The pulmonic valve is not well visualized. The pulmonic valve regurgitation was not well visualized. Pericardium: There is a trivial pericardial effusion. Aorta: The aortic root is normal. Systemic Veins: The inferior vena cava appears to be of normal size. There is IVC inspiratory collapse greater than 50%. In  comparison to the previous echocardiogram(s): There are no prior studies on this patient for comparison purposes.  CONCLUSIONS:  1. Left ventricular systolic function is normal.  2. Poorly visualized anatomical structures due to suboptimal image quality.  3. Moderately enlarged right ventricle.  4. There is a slight suggestion of a Townsend's sign on the apical views suggestive of RV strain although the function is low normal.  5. There is low normal right ventricular systolic function. QUANTITATIVE DATA SUMMARY: 2D MEASUREMENTS:                          Normal Ranges: Ao Root d:     3.50 cm   (2.0-3.7cm) LAs:           3.00 cm   (2.7-4.0cm) IVSd:          1.30 cm   (0.6-1.1cm) LVPWd:         1.00 cm   (0.6-1.1cm) LVIDd:         4.80 cm   (3.9-5.9cm) LVIDs:         2.90 cm LV Mass Index: 94.6 g/m2 LV % FS        39.6 % RA VOLUME BY A/L METHOD:                       Normal Ranges: RA Area A4C: 17.5 cm2 M-MODE MEASUREMENTS:                Normal Ranges: RVIDd: 3.50 cm (0.9-3.6cm) LV SYSTOLIC FUNCTION BY 2D PLANIMETRY (MOD):                     Normal Ranges: EF-A2C View: 59.5 %  RIGHT VENTRICLE: RV Basal 4.80 cm RV Mid   3.70 cm RV Major 8.0 cm TAPSE:   17.0 mm RV s'    0.12 m/s  [id number] Derrell Negron MD Electronically signed on 3/21/2024 at 9:21:52 AM  ** Final **     Electrocardiogram, 12-lead PRN ACS symptoms    Result Date: 3/21/2024  Sinus tachycardia Left axis deviation Right bundle branch block Inferior infarct (cited on or before 20-MAR-2024) Anterolateral infarct (cited on or before 20-MAR-2024) Abnormal ECG When compared with ECG of 20-MAR-2024 18:41, No significant change was found    Electrocardiogram, 12-lead PRN ACS symptoms    Result Date: 3/21/2024  Sinus tachycardia Left axis deviation Right bundle branch block Inferior infarct (cited on or before 20-MAR-2024) Anteroseptal infarct (cited on or before 20-MAR-2024) Abnormal ECG When compared with ECG of 20-MAR-2024 16:21, (unconfirmed) Serial changes of  Anteroseptal infarct Present    XR chest 1 view    Result Date: 3/21/2024  Interpreted By:  Nadege Parrish and Afshari Mirak Sohrab STUDY: XR CHEST 1 VIEW;  3/21/2024 3:16 am   INDICATION: Signs/Symptoms:daily icu.   COMPARISON: Chest x-ray 03/20/2024.   ACCESSION NUMBER(S): [id number]   ORDERING CLINICIAN: NILS LE   FINDINGS: AP radiograph of the chest was provided.   Endotracheal tube tip is approximately 4.2 cm above the ariadna.   An enteric tube courses below diaphragm with the tip beyond the field of view.   CARDIOMEDIASTINAL SILHOUETTE: Cardiomediastinal silhouette is normal in size and configuration.   LUNGS: Similar aeration of bilateral lungs with prominent perihilar interstitial markings. Left costophrenic angle is obscured by overlying cardiomediastinal silhouette. No focal consolidation, sizeable pleural effusion or pneumothorax.   ABDOMEN: No remarkable upper abdominal findings.   BONES: No acute osseous changes.       1. Persistent pulmonary edema. Left costophrenic angle is obscured which can be due to overlying cardiomediastinal silhouette versus small pleural effusion. No focal consolidation or pneumothorax.   I personally reviewed the images/study and I agree with the findings as stated by resident physician Dr. Ga Noel . This study was interpreted at [place], [place].   MACRO: None   Signed by: Nadege Parrish 3/21/2024 8:19 AM Dictation workstation:   [id number]    XR abdomen 1 view    Result Date: 3/21/2024  Interpreted By:  Nadege Parrish and Ohs Zachary STUDY: XR ABDOMEN 1 VIEW;  3/20/2024 7:15 pm   INDICATION: Signs/Symptoms:enteral tube placement.   COMPARISON: Chest radiograph 03/06/2024.   ACCESSION NUMBER(S): [id number]   ORDERING CLINICIAN: GILSON DUTTON   FINDINGS: Enteric tube with distal tip and side port overlying the expected location of the stomach.   Nonobstructive bowel gas pattern. No evidence of  intra-abdominal free air.   Visualized soft tissues and osseous structures are unremarkable.       1. Enteric tube with distal tip and side port overlying the expected location of the stomach. 2. Nonobstructive bowel gas pattern.   I personally reviewed the images/study and I agree with the findings as stated by Dr. Selvin Marcial. This study was interpreted at [place], [place].   MACRO: none   Signed by: Nadege Parrish 3/21/2024 8:19 AM Dictation workstation:   [id number]    XR chest 1 view    Result Date: 3/21/2024  Interpreted By:  Nadege Parrish and Ohs Zachary STUDY: XR CHEST 1 VIEW;  3/20/2024 7:04 pm   INDICATION: Signs/Symptoms:intubation s/p cardiac arrest.   COMPARISON: Chest radiograph 03/06/2024, CT chest 01/31/2024.   ACCESSION NUMBER(S): [id number]   ORDERING CLINICIAN: GILSON DUTTON   FINDINGS: AP radiograph of the chest was provided.   MEDICAL DEVICES: ETT distal tip 4.2 cm from the ariadna. Enteric tube overlies the expected location of the esophagus and stomach with distal tip not imaged.   CARDIOMEDIASTINAL SILHOUETTE: Heart is similarly enlarged when compared to prior chest radiograph 03/06/2024.   LUNGS: Low lung volumes contribute to bronchovascular crowding. Interval increase in perihilar vascular congestion. Left costophrenic angle is obscured by overlying cardiac silhouette and/or effusion. The right costophrenic angle is clear. No focal consolidation or pneumothorax.   ABDOMEN: No remarkable upper abdominal findings.   BONES: No acute osseous changes.       1. Interval increase in perihilar vascular congestion. 2. Left costophrenic angle is obscured by overlying cardiac silhouette and/or pleural effusion. 3. Medical devices as above.   I personally reviewed the images/study and I agree with the findings as stated by Dr. Selvin Marcial. This study was interpreted at [place], [place].   MACRO: None    Signed by: Nadege Parrish 3/21/2024 8:18 AM Dictation workstation:   [id number]     Scheduled medications  hydrocortisone sodium succinate, 50 mg, intravenous, q6h  micafungin, 100 mg, intravenous, q24h  mupirocin, , Each Nostril, BID  oxygen, , inhalation, Continuous - Inhalation  oxymetazoline, 2 spray, Each Nostril, q12h  pantoprazole, 40 mg, intravenous, BID  piperacillin-tazobactam, 3.375 g, intravenous, q6h  vancomycin, 1,000 mg, intravenous, q12h  white petrolatum-mineral oiL, 1 Application, Both Eyes, q6h      Continuous medications  amiodarone, 1 mg/min, Last Rate: 0.5 mg/min (03/25/24 0700)  angiotensin II (Giapreza) 2.5 mg in sodium chloride 0.9% 250 mL (0.01 mg/mL) infusion, 1.25-40 ng/kg/min, Last Rate: Stopped (03/25/24 0006)  EPINEPHrine, 0-2 mcg/kg/min (Dosing Weight), Last Rate: 0.1 mcg/kg/min (03/25/24 0700)  epoprostenol, 0.05 mcg/kg/min (Ideal), Last Rate: 0.05 mcg/kg/min (03/24/24 2337)  fentaNYL,  mcg/hr, Last Rate: 100 mcg/hr (03/25/24 0700)  heparin, 0-4,000 Units/hr, Last Rate: 2,300 Units/hr (03/25/24 0700)  insulin regular infusion for cardiac surgery, 0-15 Units/hr, Last Rate: 1 Units/hr (03/25/24 0700)  ketamine (Ketalar) 1,000 mg in 100 mL (10 mg/mL) infusion, 0-2 mg/kg/hr (Dosing Weight), Last Rate: 1.1 mg/kg/hr (03/25/24 0700)  lactated Ringer's, 5 mL/hr, Last Rate: 5 mL/hr (03/25/24 0700)  lactated Ringer's, 5 mL/hr, Last Rate: 5 mL/hr (03/25/24 0700)  norepinephrine, 0.1-3 mcg/kg/min (Dosing Weight), Last Rate: 0.1 mcg/kg/min (03/25/24 0714)  PrismaSol 4/2.5, 2,400 mL/hr, Last Rate: 2,400 mL/hr (03/25/24 0543)  vasopressin, 0.01-0.06 Units/min, Last Rate: 0.06 Units/min (03/25/24 0700)      PRN medications  PRN medications: alteplase, calcium gluconate, calcium gluconate, dextrose, dextrose, dextrose **OR** glucagon, glucagon, insulin regular, magnesium sulfate, magnesium sulfate, potassium chloride, potassium chloride, vancomycin             Assessment/Plan   Principal  Problem:    Soft tissue sarcoma (CMS/HCC)  Active Problems:    Extraskeletal myxoid chondrosarcoma (CMS/HCC)    Cardiopulmonary arrest (CMS/HCC)    Assessment:  Jason Hollins is a 63 y.o. 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 Hawkins and Dr. Smith on 3/5/24.  Post operative course was uncomplicated and patient was on RNF until 3/20 for prolonged bed rest to avoid hip flexion to maintain flap integrity.  Patient was on prophylactic lovenox while on bedrest.     3/20: Cardiopulmonary arrest s/p CPR with ROSC after TPA, overnight started on heparin, epo, increased pressor requirements, increased swelling at flap site.     3/21: Hemorrhagic shock, MTP. Firm and large right flap site. Return to OR s/p Exploration of right thigh wound, Evacuation of hematoma. Suture ligation of multiple bleeding vessel and several points in gluteal area.         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 Hawkins and Dr. Smith on 3/5/24 s/p cardiopulmonary arrest with ROSC 3/20. Documented to have followed commands off sedation. Acute post op pain. CT head 3/22 without acute process. Weaned off cisatracurium and propofol overnight 3/23-3/24. Currently on fentanyl and ketamine infusions.  - wean ketamine  - continue fentanyl   - goal RASS 0 to -3  - PT/OT -> start to see today to initiate PROM  - Restraints indicated as alternatives have been attempted and have been ineffective, restrain with soft wrist restraints until medical devices discontinued      CV: History of HTN, HLD. Acute cardiopulmonary arrest 2/2 to possible massive PE vs massive  STEMI, received multiple rounds of CPR and one defibrillation.  Sustained ROSC achieved after TPA bolus. On high dose levophed, epinephrine, vaso, angioT2. Plan on 3/21 was for ECMO which was aborted secondary to large hemhorrge at right flap site. Had MTP and taken OR with 5L evacuated. ECHO 3/21: Normal LV, Mod enlarged RV, slight suggestion of a Townsend's sign / RV strain, low normal RV function. ECHO 3/22 with hyperdynamic LV. New onset Afib with RVR overnight 3/22-3/23, dosed with 150mg amio x2, started infusion at 1mg/min thereafter. Remains on levo 0.12, epi 0.1, vaso 0.06, amio 0.5mg/min. AT2 weaned off overnight. Lactate downtrending  - stop amio  - try to start weaning vaso to goal 0.03 if tolerates levo at 0.1  - continuous EKG/abp monitoring  - Goal map range 65-90  - wean pressors as able   - continue SDS -> stretch out dosing to q8h  - slow increase of fluid removal with cvvh -> up to -120ml/hr  - continue to trend lactate     PULM: Arrived to SICU intubated julio c-CPR. Concern for massive PE. Started on iEpo, currently on 0.05. Hypoxic respiratory failure. Severe ARDS. Currently on 50%, PEEP 14. ETT exchanged 3/23.  - ARDSnet lung protective strategies  - Wean FiO2 as tolerated. Consider wean to 40%    - keep peep 14  - keep iEpo 0.05  - Frequent ABGs  - additional pulm toilet prn  - daily CXR    ENT: Had epistaxis 3/23, started on afrin bid x3 days  - monitor, low threshold to consult ENT  - avoid NG placement for now     GI: NPO. Shock liver, pancreatitis. Elevated TG. Elevated lactate. Consulted ACS for potential bowel ischemia as cause of persistent lactic acidosis. CT imaging 3/22 with evidence of pancreatitis. No acute surgical indication per ACS. RUQ US 3/22 with thickening of GB wall without cholelithiasis. Hyperbilirubinemia worse, up to 8.8 today. Lipase now WNL. Bloody output from OG, increased PPI to bid overnight.   - NPO  - keep OG to LIWS  - nutrition consult, anticipate starting  enteral feeds within next fews days  - PPI bid  - Trend LFTs, amylase, lipase daily  - no enteral feeds or bowel reg yet     : No history of renal disease. Baseline creatinine 0.6. Anuric RUTH. Elevated CK, downtrending. Metabolic acidosis, total body fluid overload, hyperkalemia. Started on CVVH 3/21, stopped bicarb infusion 3/22. Marino removed 3/24  - CVVH even to slightly negative  - RFP BID and PRN  - Replete electrolytes judiciously  - appreciate Nephrology involvement  - Creatine kinase levels daily -> continue to downtrend     HEME: Patient was on ppx lovenox for DVT prophylaxis prior to cardiopulmonary arrest. Concern for massive PE patient received bolus of TPA during CPR. Started on heparin infusion overnight given concern for PE. Hemorrhagic shock 3/21, MTP and back to OR s/p Exploration of right thigh woundEvacuation of hematoma. Suture ligation of multiple bleeding vessel and several points in gluteal area. Hematology consulted 3/22 to r/o HLH. Transfused 1 RBC overnight 3/22-3/23. Thrombocytopenia, coagulapathy  - cbc, coags bid and prn  - fibrinogen prn  - ongoing monitoring for s/s bleeding  - close surveillance of RLE and drains  - F/U heme recs  - + acute occlusive R soleal DVT -> will consult vasc med    ENDO: History of DM2. A1c 7.5%. TSH WNL 1/2024  - continue insulin drip, transition off when more stable     ID:  Hypothermic requiring warming. No leukocytosis. On broad antimicrobial therapy. MRSA screen + 2/28/24. Pan cultures sent 3/22. Sputum NTF, MRSA screen pending, BC NGTD  - temp q4h, wbc daily  - ongoing monitoring for s/s infection  - continue zosyn/vancomycin/mupirocin nasal ointment  - stop john  - F/U cultures    Lines: 3/21  - right radial arterial line  - left femoral trialysis  - left subclavian central line  - PIV     Dispo: ICU. Patient seen and discussed with ICU attending Dr. Rouse.     SICU phone [phone]    Critical care time: 60 minutes    Gracie Skaggs,  APRN-CNP

## 2024-03-25 NOTE — CONSULTS
SUPPORTIVE AND PALLIATIVE ONCOLOGY CONSULT    Inpatient consult to [facility] Adult Supportive Oncology  Consult performed by: PHILLIP Nails-CNP  Consult ordered by: PHILLIP Bernal-CNP      SERVICE DATE: 3/25/2024    PALLIATIVE MEDICINE OUTPATIENT PROVIDER:  None  CURRENT ATTENDING PROVIDER: Angy Smith MD     Medical Oncologist: Chiki Carbajal MD   Radiation Oncologist: Desmond Humphries MD  Primary Physician: Mary SIMMONS [place]  [phone]    REASON FOR CONSULT/CHIEF CONSULT COMPLAINT: Family support    Subjective   HISTORY OF PRESENT ILLNESS: Jason Hollins is a 63 y.o. 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 Hawkins and Dr. Smith on 3/5/24). Post operative course was uncomplicated and patient was on RMF for prolonged bed rest to avoid hip flexion to maintain flap integrity. On 3/20, pt arrested after getting up to walk and experiencing sudden chest heaviness. ROSC was achieved after 1 round CPR, but then pt decompensated into PEA where CPR was resumed. Pt subsequently sent to CTICU with c/f STEMI vs massive PE. Sustained ROSC eventually achieved. No evidence of PE or thrombi were visualized on cath lab angiography. Course complicated by hypotension on pressors, metabolic acidosis requiring CVVH, pancreatitis, and hypoxic respiratory failure on MV. Supportive and Palliative Oncology is consulted for family support.    After assessing pt, met with pt's wife, Lakisha, and pt's sister in waiting room. Introduced philosophy of Supportive Oncology and offered our support. Lakisha and pt's sister expressing appreciation for additional resources and support.     Pain Assessment:  Treatment/Home Regimen: Gabapentin 300mg PO 1-2 capsules daily, Ibuprofen 800mg PO q8h PRN   Intolerances: None identified     NPAT:   Emotion: 0  Movement:  0  Verbal Cues: 0  Facial Cues: 0  Positioning/Guardin  Total Score: 0          Opioid Use  Past 24h opioid use:   Continuous fentanyl gtt as part of pain and sedation management.     Total 24h OME use:  0 OME    Note: OME calculations based on equianalgesic table below. Please note this table is based on best available evidence but conversions are still approximate. These are NOT opioid DOSES for individual patient use; this is equivalency information.  Drug Parenteral Enteral   Morphine 10 25   Oxycodone N/A 20   Hydromorphone 2 5   Fentanyl 0.15 N/A   Tramadol N/A 120   Citation: Lizette MÁRQUEZ. Demystifying opioid conversion calculations: A guide for effective dosing, Second edition. [place]: American Society of Health-System Pharmacists, 2018.    OARRS/PDMP reviewed no aberrant behavior noted.    Symptom Assessment:  Unable to obtain secondary to unresponsiveness i/s/o pharmacologic sedation and MV.    Information obtained from: chart review, interview of family, discussion with RN, and discussion with primary team  ______________________________________________________________________     Oncology History    No history exists.     Past Medical History:   Diagnosis Date    Diabetes mellitus (CMS/HCC)     Hyperlipidemia     Morbid (severe) obesity due to excess calories (CMS/HCC) 2016    Severe obesity (BMI 35.0-39.9) with comorbidity     Past Surgical History:   Procedure Laterality Date    CT GUIDED PERCUTANEOUS BIOPSY MUSCLE  2023    CT GUIDED PERCUTANEOUS BIOPSY MUSCLE 2023 [facility] CT    INVASIVE VASCULAR PROCEDURE N/A 3/20/2024    Procedure: Pulmonary Angiogram;  Surgeon: Axel Wolfe MD;  Location: [place] [address] Cardiac Cath Lab;  Service: Cardiovascular;  Laterality: N/A;    OTHER SURGICAL HISTORY  2016    History Of Prior Surgery     Family History   Problem Relation Name Age of Onset    Coronary artery disease Mother      Diabetes Mother      Hypertension Mother      Cancer Father       Diabetes Sister      Diabetes Brother      Diabetes Other Grandmother       SOCIAL HISTORY:   Social History:  reports that he has never smoked. He has never used smokeless tobacco. He reports that he does not drink alcohol and does not use drugs.  Lives with his wife and has 2 children  Wife is Lakisha Hollins, [phone]  Works as a   Has lots of nurses in the family    REVIEW OF SYSTEMS:  Review of systems negative unless noted in HPI.     Objective     Lab Results   Component Value Date    WBC 20.7 (H) 03/25/2024    HGB 8.2 (L) 03/25/2024    HCT 23.7 (L) 03/25/2024    MCV 82 03/25/2024    PLT 83 (L) 03/25/2024      Lab Results   Component Value Date    GLUCOSE 144 (H) 03/25/2024    CALCIUM 8.5 (L) 03/25/2024     (L) 03/25/2024    K 4.1 03/25/2024    CO2 20 (L) 03/25/2024    CL 97 (L) 03/25/2024    BUN 21 03/25/2024    CREATININE 2.22 (H) 03/25/2024     Lab Results   Component Value Date    ALT 1,524 (H) 03/25/2024    AST 1,253 (H) 03/25/2024    GGT 21 02/13/2024    ALKPHOS 123 03/25/2024    BILITOT 8.8 (H) 03/25/2024     Estimated Creatinine Clearance: 48.7 mL/min (A) (by C-G formula based on SCr of 2.22 mg/dL (H)).     Current Outpatient Medications   Medication Instructions    aspirin 81 mg EC tablet 1 tablet, oral, Daily, AS DIRECTED.    atorvastatin (LIPITOR) 40 mg, oral, Daily    blood sugar diagnostic (Blood Glucose Test) strip Daily, One Drop Test In Vitro Strip; Test    chlorhexidine (Peridex) 0.12 % solution 15 mL, Mouth/Throat, See admin instructions, Use the night before and morning of surgery.    dulaglutide (TRULICITY) 4.5 mg/0.5 mL pen injector INJECT ONE PEN (4.5 MG) UNDER THE SKIN ONCE WEEKLY    ergocalciferol (VITAMIN D-2) 50,000 Units, oral, Every 14 days, twice monthly on the 15th and the 30 th for vitamin d deficiency    fish oil concentrate (Omega-3) 120-180 mg capsule 1 g, oral    gabapentin (Neurontin) 300 mg capsule Use the 100 mg capsule to titrate to 300 mg , then use  the 300 mg capsule and take 1 to 2 capsules hs for pain    ibuprofen 800 mg, oral, 3 times daily PRN    metFORMIN (Glucophage) 1,000 mg tablet TAKE 1 TABLET BY MOUTH EVERY MORNING AND 1.5 TABLETS BY MOUTH EVERY EVENING ,    multivitamin tablet 1 tablet, oral, Daily    NIFEdipine ER (ADALAT CC) 30 mg, oral, Daily before breakfast, Do not crush, chew, or split.    OneTouch Ultra Test strip Test blood sugar once daily    sildenafil (VIAGRA) 100 mg, oral, Daily PRN    tiZANidine (ZANAFLEX) 4 mg, oral, 3 times daily    Trulicity 4.5 mg, subcutaneous, Weekly     Scheduled medications   albumin human, 25 g, intravenous, q6h  hydrocortisone sodium succinate, 50 mg, intravenous, q8h  mupirocin, , Each Nostril, BID  oxygen, , inhalation, Continuous - Inhalation  oxymetazoline, 2 spray, Each Nostril, q12h  pantoprazole, 40 mg, intravenous, BID  piperacillin-tazobactam, 3.375 g, intravenous, q6h  vancomycin, 1,000 mg, intravenous, q12h  white petrolatum-mineral oiL, 1 Application, Both Eyes, q6h      Continuous medications  EPINEPHrine, 0-2 mcg/kg/min (Dosing Weight), Last Rate: 0.1 mcg/kg/min (03/25/24 1200)  epoprostenol, 0.05 mcg/kg/min (Ideal), Last Rate: 0.05 mcg/kg/min (03/25/24 1026)  fentaNYL,  mcg/hr, Last Rate: 100 mcg/hr (03/25/24 1200)  heparin, 0-4,000 Units/hr, Last Rate: 2,300 Units/hr (03/25/24 1200)  insulin regular infusion for cardiac surgery, 0-15 Units/hr, Last Rate: 1 Units/hr (03/25/24 1200)  ketamine (Ketalar) 1,000 mg in 100 mL (10 mg/mL) infusion, 0-2 mg/kg/hr (Dosing Weight), Last Rate: 0.5 mg/kg/hr (03/25/24 1200)  lactated Ringer's, 5 mL/hr, Last Rate: 5 mL/hr (03/25/24 1200)  lactated Ringer's, 5 mL/hr, Last Rate: 5 mL/hr (03/25/24 1200)  norepinephrine, 0.1-3 mcg/kg/min (Dosing Weight), Last Rate: 0.13 mcg/kg/min (03/25/24 1200)  PrismaSol 4/2.5, 2,400 mL/hr, Last Rate: 2,400 mL/hr (03/25/24 0920)  vasopressin, 0.01-0.06 Units/min, Last Rate: 0.03 Units/min (03/25/24 1245)      PRN  medications  PRN medications: alteplase, calcium gluconate, calcium gluconate, dextrose, dextrose, dextrose **OR** glucagon, glucagon, insulin regular, magnesium sulfate, magnesium sulfate, potassium chloride, potassium chloride, vancomycin     Allergies: No Known Allergies          PHYSICAL EXAMINATION:  Vital Signs:   Vital signs reviewed  Vitals:    03/25/24 1245   BP:    Pulse: 73   Resp: 24   Temp:    SpO2: 94%     Pain Score: 4    Physical Exam  Vitals reviewed.   Constitutional:       Comments: Unresponsive, ill appearing gentleman laying in bed. No signs of acute distress. Not participating in interview or assessment.      HENT:      Head:      Comments: Normocephalic, atraumatic.        Mouth/Throat:      Comments: ETT present and secure.   Eyes:      Comments: Sclera jaundiced. Unable to assess EOM. Pupils 3mm and sluggish.   Neck:      Comments: Trachea midline.  Pulmonary:      Comments: Symmetrical chest rise. Regular rate and depth of respirations. Ventilator settings: FiO2: 50%, TV: 550, PEEP: 14, RR: 24.   Abdominal:      Comments: Abdomen distended, obese, and soft.   Musculoskeletal:      Comments: Generalized muscle weakness and atrophy. CUELLAR x4. +3 edema in BLE and BUE.   Skin:     Capillary Refill: Capillary refill takes less than 2 seconds.      Comments: No lesions, rash, or abrasions present on visible skin. Skin color appropriate for ethnicity.   Neurological:      Comments: Unable to establish orientation, does not follow commands, RASS -5.       ASSESSMENT/PLAN:  Jason Hollins is a 63 y.o. 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 Hawkins and Dr. Smith on 3/5/24). Post operative course was uncomplicated and patient was on RMF for prolonged bed rest to avoid hip flexion to maintain flap integrity. On 3/20, pt  arrested after getting up to walk and experiencing sudden chest heaviness. ROSC was achieved after 1 round CPR, but then pt decompensated into PEA where CPR was resumed. Pt subsequently sent to CTICU with c/f STEMI vs massive PE. Sustained ROSC eventually achieved. No evidence of PE or thrombi were visualized on cath lab angiography. Course complicated by hypotension on pressors, metabolic acidosis requiring CVVH, pancreatitis, and hypoxic respiratory failure on MV. Supportive and Palliative Oncology is consulted family support.      At Risk For Pain  2/2 known malignancy, post-operative period, and pancreatitis  Type: Likely somatic and visceral  Pain control: Well-controlled  Home regimen: Gabapentin 300mg PO 1-2 capsules daily, Ibuprofen 800mg PO q8h PRN   Intolerances/previously tried: None identified  Personalized pain goal: Unable to establish  Total OME usage for the past 24 hours: 0 OME  Reviewed: (3/25/24) Estimated Creatinine Clearance: 48.7 mL/min (A) (by C-G formula based on SCr of 2.22 mg/dL (H)).  Reviewed: (3/25/24) Alk Phos (123), ALT (1,524), AST (1,253), Bili Total (8.8)  Currently sedated with fentanyl gtt and ketamine gtt  Continue to monitor pain scores and administer PRN medications as appropriate  Continue/initiate nonpharmacologic pain management strategies including ice/heat therapy, distraction techniques, deep breathing/relaxation techniques, calming music, and repositioning  Continue to monitor for signs of opioid efficacy (pain scores, improved functionality) and toxicity (pinpoint pupils, excess sedation/drowsiness/confusion, respiratory depression, etc.)    Nausea  At risk for nausea and vomiting related to opioids and intubation   Home regimen: None  Well-controlled  Reviewed EKG from 3/20/24, QTc 456  PPI per primary  May start Zofran 4mg IV q6h PRN for n/v if needed  May start Compazine 10mg IV q8h PRN for n/v if needed    Constipation  At risk for constipation related to opioids,  currently constipated  Home regimen: None  LBM: 3/22  Consider starting Miralax 17g through OGT daily and/or Fely-Colace 2 tabs through OGT BID  Monitor BM frequency, adjust regimen as needed  Goal to have BM without straining q48-72h     Medical Decision Making/Goals of Care/Advance Care Planning:  (3/25/24, per discussion with pt's wife)  Patient's current clinical condition, including diagnosis, prognosis, and management plan, and goals of care were discussed.   Life limiting disease: Current shock--likely cardiogenic in nature, existing malignancy  Family: Supportive, pt's wife and 2 kids, lots of extended family  Performance status: Major limitations due to disease process, hemodynamic instability, and AMS.  Joys/meaning/strength: Family, Lily, and Alma  Understanding of health: Pt's wife demonstrates good prognostic understanding of disease process, understands plan for weaning pt's sedation to assess mental status.   Information: Wants full disclosure, appreciates honesty/transparency  Medical update: Per primary team, wife feels CTICU staff has been keeping her well informed.   Goals: Wean pt's sedation to assess mentation, get off blood pressure medications, keep pt stable  Worries and fears now and future: Death   Minimum acceptable outcome/QOL: Pt's wife and family ok for pt to receive a trache and remain on dialysis  Code status discussion: Wife would like pt to remain Full Code, fully appreciates the implications of CPR and intubation if necessary    Advance Directives  Existence of Advance Directives: Patient unable to complete  Decision maker: Surrogate decision maker is pt's wife, Lakisha Hollins, [phone]  Code Status: Full Code    Introduction to Supportive and Palliative Oncology:  Spoke with pt's wife and sister in unit waiting room after visiting pt's room.   Introduced the role and philosophy of Supportive and Palliative oncology in the evaluation and management of symptoms during  cancer treatment.  Palliative care was introduced as a service for patients with serious illness to help with symptoms, assist with goals of care conversations, navigate complex decision making, improve quality of life for patients, and provide support both patients and families.  Patient seemed to appreciate the extra layer of support.     Supportive Interventions: Interventions: SPO Spiritual Care: already following    Disposition:  Please  start the process of having prior authorization with meds to beds deliver [facility]. Prescriptions will need to be sent 48-72 hours prior to discharge so that a prior authorization can be completed.     Discharge date pending [facility] discussion.  Does not currently qualify for an appointment with Outpatient Supportive Oncology.    Signature and billing:  Thank you for allowing us to participate in the care of this patient. Recommendations will be communicated back to the consulting service by way of shared electronic medical record or face-to-face.    Medical complexity was high level due to due to complexity of problems, extensive data review, and high risk of management/treatment.    I spent 75 minutes in the care of this patient which included chart review, interviewing patient/family, discussion with primary team, coordination of care, and documentation.    DATA   Diagnostic tests and information reviewed for today's visit:  Conversation with primary team, Most recent labs and imaging results, Most recent EKG, Medications     Some elements copied from Surgical Critical Care's note on 3/24/25, the elements have been updated and all reflect current decision making from today, 3/25/2024.    Plan of Care discussed with: Primary team, pt's wife    Thank you for asking Supportive and Palliative Oncology to assist with care of this patient.  We will continue to follow.  Please contact us for additional questions or  concerns.    SIGNATURE: PHILLIP Nalis-TUNG  PAGER/CONTACT:  Contact information:  Supportive and Palliative Oncology  Monday-Friday 8 AM-5 PM  Epic Secure chat or pager [phone].  After hours and weekends:  pager [phone]

## 2024-03-26 ENCOUNTER — APPOINTMENT (OUTPATIENT)
Dept: RADIOLOGY | Facility: HOSPITAL | Age: 64
DRG: 003 | End: 2024-03-26
Payer: COMMERCIAL

## 2024-03-26 LAB
ALBUMIN SERPL BCP-MCNC: 3.6 G/DL (ref 3.4–5)
ALBUMIN SERPL BCP-MCNC: 3.9 G/DL (ref 3.4–5)
ALP SERPL-CCNC: 111 U/L (ref 33–136)
ALT SERPL W P-5'-P-CCNC: 1152 U/L (ref 10–52)
ANION GAP BLDA CALCULATED.4IONS-SCNC: 15 MMO/L (ref 10–25)
ANION GAP BLDA CALCULATED.4IONS-SCNC: 15 MMO/L (ref 10–25)
ANION GAP BLDA CALCULATED.4IONS-SCNC: 18 MMO/L (ref 10–25)
ANION GAP SERPL CALC-SCNC: 20 MMOL/L (ref 10–20)
ANION GAP SERPL CALC-SCNC: 21 MMOL/L (ref 10–20)
APTT PPP: 198 SECONDS (ref 27–38)
APTT PPP: 46 SECONDS (ref 27–38)
AST SERPL W P-5'-P-CCNC: 759 U/L (ref 9–39)
BACTERIA BLD CULT: NORMAL
BACTERIA BLD CULT: NORMAL
BASE EXCESS BLDA CALC-SCNC: -2.7 MMOL/L (ref -2–3)
BASE EXCESS BLDA CALC-SCNC: -4.5 MMOL/L (ref -2–3)
BASE EXCESS BLDA CALC-SCNC: -4.5 MMOL/L (ref -2–3)
BILIRUB DIRECT SERPL-MCNC: 6.7 MG/DL (ref 0–0.3)
BILIRUB SERPL-MCNC: 10.6 MG/DL (ref 0–1.2)
BLOOD EXPIRATION DATE: NORMAL
BODY TEMPERATURE: 37 DEGREES CELSIUS
BUN SERPL-MCNC: 24 MG/DL (ref 6–23)
BUN SERPL-MCNC: 25 MG/DL (ref 6–23)
CA-I BLD-SCNC: 0.98 MMOL/L (ref 1.1–1.33)
CA-I BLD-SCNC: 1.05 MMOL/L (ref 1.1–1.33)
CA-I BLDA-SCNC: 1.03 MMOL/L (ref 1.1–1.33)
CA-I BLDA-SCNC: 1.05 MMOL/L (ref 1.1–1.33)
CA-I BLDA-SCNC: 1.07 MMOL/L (ref 1.1–1.33)
CALCIUM SERPL-MCNC: 8.2 MG/DL (ref 8.6–10.6)
CALCIUM SERPL-MCNC: 8.3 MG/DL (ref 8.6–10.6)
CHLORIDE BLDA-SCNC: 101 MMOL/L (ref 98–107)
CHLORIDE BLDA-SCNC: 97 MMOL/L (ref 98–107)
CHLORIDE BLDA-SCNC: 98 MMOL/L (ref 98–107)
CHLORIDE SERPL-SCNC: 97 MMOL/L (ref 98–107)
CHLORIDE SERPL-SCNC: 98 MMOL/L (ref 98–107)
CK SERPL-CCNC: 514 U/L (ref 0–325)
CO2 SERPL-SCNC: 19 MMOL/L (ref 21–32)
CO2 SERPL-SCNC: 20 MMOL/L (ref 21–32)
CREAT SERPL-MCNC: 2.08 MG/DL (ref 0.5–1.3)
CREAT SERPL-MCNC: 2.1 MG/DL (ref 0.5–1.3)
DISPENSE STATUS: NORMAL
EGFRCR SERPLBLD CKD-EPI 2021: 35 ML/MIN/1.73M*2
EGFRCR SERPLBLD CKD-EPI 2021: 35 ML/MIN/1.73M*2
ERYTHROCYTE [DISTWIDTH] IN BLOOD BY AUTOMATED COUNT: 15.9 % (ref 11.5–14.5)
ERYTHROCYTE [DISTWIDTH] IN BLOOD BY AUTOMATED COUNT: 15.9 % (ref 11.5–14.5)
ERYTHROCYTE [DISTWIDTH] IN BLOOD BY AUTOMATED COUNT: 16.1 % (ref 11.5–14.5)
ERYTHROCYTE [DISTWIDTH] IN BLOOD BY AUTOMATED COUNT: 16.2 % (ref 11.5–14.5)
ERYTHROCYTE [DISTWIDTH] IN BLOOD BY AUTOMATED COUNT: 16.3 % (ref 11.5–14.5)
ERYTHROCYTE [DISTWIDTH] IN BLOOD BY AUTOMATED COUNT: 16.3 % (ref 11.5–14.5)
FIBRINOGEN PPP-MCNC: 195 MG/DL (ref 200–400)
GLUCOSE BLD MANUAL STRIP-MCNC: 150 MG/DL (ref 74–99)
GLUCOSE BLD MANUAL STRIP-MCNC: 153 MG/DL (ref 74–99)
GLUCOSE BLD MANUAL STRIP-MCNC: 153 MG/DL (ref 74–99)
GLUCOSE BLD MANUAL STRIP-MCNC: 157 MG/DL (ref 74–99)
GLUCOSE BLD MANUAL STRIP-MCNC: 160 MG/DL (ref 74–99)
GLUCOSE BLD MANUAL STRIP-MCNC: 160 MG/DL (ref 74–99)
GLUCOSE BLD MANUAL STRIP-MCNC: 162 MG/DL (ref 74–99)
GLUCOSE BLD MANUAL STRIP-MCNC: 165 MG/DL (ref 74–99)
GLUCOSE BLD MANUAL STRIP-MCNC: 165 MG/DL (ref 74–99)
GLUCOSE BLD MANUAL STRIP-MCNC: 167 MG/DL (ref 74–99)
GLUCOSE BLD MANUAL STRIP-MCNC: 168 MG/DL (ref 74–99)
GLUCOSE BLDA-MCNC: 138 MG/DL (ref 74–99)
GLUCOSE BLDA-MCNC: 148 MG/DL (ref 74–99)
GLUCOSE BLDA-MCNC: 150 MG/DL (ref 74–99)
GLUCOSE SERPL-MCNC: 152 MG/DL (ref 74–99)
GLUCOSE SERPL-MCNC: 154 MG/DL (ref 74–99)
HCO3 BLDA-SCNC: 19.6 MMOL/L (ref 22–26)
HCO3 BLDA-SCNC: 20.1 MMOL/L (ref 22–26)
HCO3 BLDA-SCNC: 21.4 MMOL/L (ref 22–26)
HCT VFR BLD AUTO: 18.9 % (ref 41–52)
HCT VFR BLD AUTO: 20.2 % (ref 41–52)
HCT VFR BLD AUTO: 20.4 % (ref 41–52)
HCT VFR BLD AUTO: 20.8 % (ref 41–52)
HCT VFR BLD AUTO: 20.9 % (ref 41–52)
HCT VFR BLD AUTO: 21.3 % (ref 41–52)
HCT VFR BLD EST: 21 % (ref 41–52)
HCT VFR BLD EST: 24 % (ref 41–52)
HCT VFR BLD EST: 24 % (ref 41–52)
HGB BLD-MCNC: 7 G/DL (ref 13.5–17.5)
HGB BLD-MCNC: 7.1 G/DL (ref 13.5–17.5)
HGB BLD-MCNC: 7.4 G/DL (ref 13.5–17.5)
HGB BLD-MCNC: 7.6 G/DL (ref 13.5–17.5)
HGB BLDA-MCNC: 7.1 G/DL (ref 13.5–17.5)
HGB BLDA-MCNC: 8 G/DL (ref 13.5–17.5)
HGB BLDA-MCNC: 8 G/DL (ref 13.5–17.5)
INHALED O2 CONCENTRATION: 50 %
INR PPP: 1.6 (ref 0.9–1.1)
INR PPP: 1.7 (ref 0.9–1.1)
LACTATE BLDA-SCNC: 3.8 MMOL/L (ref 0.4–2)
LACTATE BLDA-SCNC: 4 MMOL/L (ref 0.4–2)
LACTATE BLDA-SCNC: 4.7 MMOL/L (ref 0.4–2)
MAGNESIUM SERPL-MCNC: 2.29 MG/DL (ref 1.6–2.4)
MAGNESIUM SERPL-MCNC: 2.36 MG/DL (ref 1.6–2.4)
MCH RBC QN AUTO: 28.9 PG (ref 26–34)
MCH RBC QN AUTO: 29 PG (ref 26–34)
MCH RBC QN AUTO: 29.2 PG (ref 26–34)
MCH RBC QN AUTO: 29.7 PG (ref 26–34)
MCH RBC QN AUTO: 29.8 PG (ref 26–34)
MCH RBC QN AUTO: 29.8 PG (ref 26–34)
MCHC RBC AUTO-ENTMCNC: 35.1 G/DL (ref 32–36)
MCHC RBC AUTO-ENTMCNC: 35.4 G/DL (ref 32–36)
MCHC RBC AUTO-ENTMCNC: 35.7 G/DL (ref 32–36)
MCHC RBC AUTO-ENTMCNC: 36.5 G/DL (ref 32–36)
MCHC RBC AUTO-ENTMCNC: 37 G/DL (ref 32–36)
MCHC RBC AUTO-ENTMCNC: 37.3 G/DL (ref 32–36)
MCV RBC AUTO: 80 FL (ref 80–100)
MCV RBC AUTO: 80 FL (ref 80–100)
MCV RBC AUTO: 81 FL (ref 80–100)
MCV RBC AUTO: 82 FL (ref 80–100)
NRBC BLD-RTO: 4.2 /100 WBCS (ref 0–0)
NRBC BLD-RTO: 4.8 /100 WBCS (ref 0–0)
NRBC BLD-RTO: 4.8 /100 WBCS (ref 0–0)
NRBC BLD-RTO: 5.4 /100 WBCS (ref 0–0)
NRBC BLD-RTO: 5.5 /100 WBCS (ref 0–0)
NRBC BLD-RTO: 6.5 /100 WBCS (ref 0–0)
OXYHGB MFR BLDA: 95.3 % (ref 94–98)
OXYHGB MFR BLDA: 96.3 % (ref 94–98)
OXYHGB MFR BLDA: 96.5 % (ref 94–98)
PCO2 BLDA: 31 MM HG (ref 38–42)
PCO2 BLDA: 33 MM HG (ref 38–42)
PCO2 BLDA: 34 MM HG (ref 38–42)
PEEP CMH2O: 14 CM H2O
PH BLDA: 7.38 PH (ref 7.38–7.42)
PH BLDA: 7.41 PH (ref 7.38–7.42)
PH BLDA: 7.42 PH (ref 7.38–7.42)
PHOSPHATE SERPL-MCNC: 2.3 MG/DL (ref 2.5–4.9)
PHOSPHATE SERPL-MCNC: 2.8 MG/DL (ref 2.5–4.9)
PLATELET # BLD AUTO: 50 X10*3/UL (ref 150–450)
PLATELET # BLD AUTO: 54 X10*3/UL (ref 150–450)
PLATELET # BLD AUTO: 56 X10*3/UL (ref 150–450)
PLATELET # BLD AUTO: 57 X10*3/UL (ref 150–450)
PLATELET # BLD AUTO: 65 X10*3/UL (ref 150–450)
PLATELET # BLD AUTO: 66 X10*3/UL (ref 150–450)
PO2 BLDA: 104 MM HG (ref 85–95)
PO2 BLDA: 83 MM HG (ref 85–95)
PO2 BLDA: 90 MM HG (ref 85–95)
POTASSIUM BLDA-SCNC: 4.1 MMOL/L (ref 3.5–5.3)
POTASSIUM BLDA-SCNC: 4.3 MMOL/L (ref 3.5–5.3)
POTASSIUM BLDA-SCNC: 4.5 MMOL/L (ref 3.5–5.3)
POTASSIUM SERPL-SCNC: 4.3 MMOL/L (ref 3.5–5.3)
POTASSIUM SERPL-SCNC: 4.4 MMOL/L (ref 3.5–5.3)
PRODUCT BLOOD TYPE: 5100
PRODUCT BLOOD TYPE: 5100
PRODUCT BLOOD TYPE: 7300
PRODUCT CODE: NORMAL
PROT SERPL-MCNC: 4.8 G/DL (ref 6.4–8.2)
PROTHROMBIN TIME: 17.6 SECONDS (ref 9.8–12.8)
PROTHROMBIN TIME: 19.2 SECONDS (ref 9.8–12.8)
RBC # BLD AUTO: 2.35 X10*6/UL (ref 4.5–5.9)
RBC # BLD AUTO: 2.46 X10*6/UL (ref 4.5–5.9)
RBC # BLD AUTO: 2.55 X10*6/UL (ref 4.5–5.9)
RBC # BLD AUTO: 2.55 X10*6/UL (ref 4.5–5.9)
RBC # BLD AUTO: 2.56 X10*6/UL (ref 4.5–5.9)
RBC # BLD AUTO: 2.6 X10*6/UL (ref 4.5–5.9)
SAO2 % BLDA: 97 % (ref 94–100)
SAO2 % BLDA: 98 % (ref 94–100)
SAO2 % BLDA: 99 % (ref 94–100)
SODIUM BLDA-SCNC: 130 MMOL/L (ref 136–145)
SODIUM BLDA-SCNC: 131 MMOL/L (ref 136–145)
SODIUM BLDA-SCNC: 131 MMOL/L (ref 136–145)
SODIUM SERPL-SCNC: 133 MMOL/L (ref 136–145)
SODIUM SERPL-SCNC: 134 MMOL/L (ref 136–145)
UFH PPP CHRO-ACNC: 0.3 IU/ML
UNIT ABO: NORMAL
UNIT NUMBER: NORMAL
UNIT RH: NORMAL
UNIT VOLUME: 211
UNIT VOLUME: 350
UNIT VOLUME: 350
WBC # BLD AUTO: 19.3 X10*3/UL (ref 4.4–11.3)
WBC # BLD AUTO: 19.6 X10*3/UL (ref 4.4–11.3)
WBC # BLD AUTO: 21.3 X10*3/UL (ref 4.4–11.3)
WBC # BLD AUTO: 21.4 X10*3/UL (ref 4.4–11.3)
WBC # BLD AUTO: 21.5 X10*3/UL (ref 4.4–11.3)
WBC # BLD AUTO: 21.7 X10*3/UL (ref 4.4–11.3)
XM INTEP: NORMAL
XM INTEP: NORMAL

## 2024-03-26 PROCEDURE — 99222 1ST HOSP IP/OBS MODERATE 55: CPT | Performed by: INTERNAL MEDICINE

## 2024-03-26 PROCEDURE — 2500000004 HC RX 250 GENERAL PHARMACY W/ HCPCS (ALT 636 FOR OP/ED): Performed by: NURSE PRACTITIONER

## 2024-03-26 PROCEDURE — 74174 CTA ABD&PLVS W/CONTRAST: CPT | Performed by: RADIOLOGY

## 2024-03-26 PROCEDURE — 84132 ASSAY OF SERUM POTASSIUM: CPT | Performed by: NURSE PRACTITIONER

## 2024-03-26 PROCEDURE — 85384 FIBRINOGEN ACTIVITY: CPT | Performed by: NURSE PRACTITIONER

## 2024-03-26 PROCEDURE — 86352 CELL FUNCTION ASSAY W/STIM: CPT | Performed by: NURSE PRACTITIONER

## 2024-03-26 PROCEDURE — 2500000004 HC RX 250 GENERAL PHARMACY W/ HCPCS (ALT 636 FOR OP/ED): Performed by: STUDENT IN AN ORGANIZED HEALTH CARE EDUCATION/TRAINING PROGRAM

## 2024-03-26 PROCEDURE — 85027 COMPLETE CBC AUTOMATED: CPT | Performed by: NURSE PRACTITIONER

## 2024-03-26 PROCEDURE — 82550 ASSAY OF CK (CPK): CPT

## 2024-03-26 PROCEDURE — 71275 CT ANGIOGRAPHY CHEST: CPT | Performed by: RADIOLOGY

## 2024-03-26 PROCEDURE — 94799 UNLISTED PULMONARY SVC/PX: CPT

## 2024-03-26 PROCEDURE — 2500000005 HC RX 250 GENERAL PHARMACY W/O HCPCS: Performed by: STUDENT IN AN ORGANIZED HEALTH CARE EDUCATION/TRAINING PROGRAM

## 2024-03-26 PROCEDURE — 85520 HEPARIN ASSAY: CPT | Performed by: NURSE PRACTITIONER

## 2024-03-26 PROCEDURE — 71275 CT ANGIOGRAPHY CHEST: CPT

## 2024-03-26 PROCEDURE — 94003 VENT MGMT INPAT SUBQ DAY: CPT

## 2024-03-26 PROCEDURE — 2500000004 HC RX 250 GENERAL PHARMACY W/ HCPCS (ALT 636 FOR OP/ED)

## 2024-03-26 PROCEDURE — 82330 ASSAY OF CALCIUM: CPT

## 2024-03-26 PROCEDURE — 83735 ASSAY OF MAGNESIUM: CPT | Performed by: NURSE PRACTITIONER

## 2024-03-26 PROCEDURE — 99291 CRITICAL CARE FIRST HOUR: CPT

## 2024-03-26 PROCEDURE — 37799 UNLISTED PX VASCULAR SURGERY: CPT

## 2024-03-26 PROCEDURE — P9047 ALBUMIN (HUMAN), 25%, 50ML: HCPCS | Mod: JZ | Performed by: STUDENT IN AN ORGANIZED HEALTH CARE EDUCATION/TRAINING PROGRAM

## 2024-03-26 PROCEDURE — 71045 X-RAY EXAM CHEST 1 VIEW: CPT | Performed by: RADIOLOGY

## 2024-03-26 PROCEDURE — 99232 SBSQ HOSP IP/OBS MODERATE 35: CPT | Performed by: PHYSICIAN ASSISTANT

## 2024-03-26 PROCEDURE — 71045 X-RAY EXAM CHEST 1 VIEW: CPT

## 2024-03-26 PROCEDURE — 37799 UNLISTED PX VASCULAR SURGERY: CPT | Performed by: NURSE PRACTITIONER

## 2024-03-26 PROCEDURE — 36430 TRANSFUSION BLD/BLD COMPNT: CPT

## 2024-03-26 PROCEDURE — 70450 CT HEAD/BRAIN W/O DYE: CPT

## 2024-03-26 PROCEDURE — 90937 HEMODIALYSIS REPEATED EVAL: CPT

## 2024-03-26 PROCEDURE — 2500000005 HC RX 250 GENERAL PHARMACY W/O HCPCS

## 2024-03-26 PROCEDURE — P9017 PLASMA 1 DONOR FRZ W/IN 8 HR: HCPCS

## 2024-03-26 PROCEDURE — 70450 CT HEAD/BRAIN W/O DYE: CPT | Performed by: RADIOLOGY

## 2024-03-26 PROCEDURE — 2500000005 HC RX 250 GENERAL PHARMACY W/O HCPCS: Performed by: NURSE PRACTITIONER

## 2024-03-26 PROCEDURE — 84100 ASSAY OF PHOSPHORUS: CPT | Performed by: NURSE PRACTITIONER

## 2024-03-26 PROCEDURE — 2500000004 HC RX 250 GENERAL PHARMACY W/ HCPCS (ALT 636 FOR OP/ED): Performed by: PHYSICIAN ASSISTANT

## 2024-03-26 PROCEDURE — 94645 CONT INHLJ TX EACH ADDL HOUR: CPT

## 2024-03-26 PROCEDURE — 82947 ASSAY GLUCOSE BLOOD QUANT: CPT

## 2024-03-26 PROCEDURE — 86022 PLATELET ANTIBODIES: CPT

## 2024-03-26 PROCEDURE — 84075 ASSAY ALKALINE PHOSPHATASE: CPT | Performed by: NURSE PRACTITIONER

## 2024-03-26 PROCEDURE — 2020000001 HC ICU ROOM DAILY

## 2024-03-26 PROCEDURE — C9113 INJ PANTOPRAZOLE SODIUM, VIA: HCPCS | Performed by: STUDENT IN AN ORGANIZED HEALTH CARE EDUCATION/TRAINING PROGRAM

## 2024-03-26 PROCEDURE — 2550000001 HC RX 255 CONTRASTS

## 2024-03-26 PROCEDURE — P9040 RBC LEUKOREDUCED IRRADIATED: HCPCS

## 2024-03-26 PROCEDURE — 85610 PROTHROMBIN TIME: CPT | Performed by: NURSE PRACTITIONER

## 2024-03-26 PROCEDURE — 99291 CRITICAL CARE FIRST HOUR: CPT | Performed by: STUDENT IN AN ORGANIZED HEALTH CARE EDUCATION/TRAINING PROGRAM

## 2024-03-26 PROCEDURE — 2500000004 HC RX 250 GENERAL PHARMACY W/ HCPCS (ALT 636 FOR OP/ED): Mod: JZ | Performed by: STUDENT IN AN ORGANIZED HEALTH CARE EDUCATION/TRAINING PROGRAM

## 2024-03-26 PROCEDURE — 86352 CELL FUNCTION ASSAY W/STIM: CPT

## 2024-03-26 PROCEDURE — 90945 DIALYSIS ONE EVALUATION: CPT | Performed by: INTERNAL MEDICINE

## 2024-03-26 RX ORDER — HEPARIN SODIUM 1000 [USP'U]/ML
INJECTION, SOLUTION INTRAVENOUS; SUBCUTANEOUS
Status: DISPENSED
Start: 2024-03-26 | End: 2024-03-27

## 2024-03-26 RX ORDER — HYDROMORPHONE HYDROCHLORIDE 1 MG/ML
0.2 INJECTION, SOLUTION INTRAMUSCULAR; INTRAVENOUS; SUBCUTANEOUS EVERY 4 HOURS PRN
Status: DISCONTINUED | OUTPATIENT
Start: 2024-03-26 | End: 2024-03-27

## 2024-03-26 RX ORDER — HYDROMORPHONE HYDROCHLORIDE 1 MG/ML
0.2 INJECTION, SOLUTION INTRAMUSCULAR; INTRAVENOUS; SUBCUTANEOUS EVERY 4 HOURS PRN
Status: DISCONTINUED | OUTPATIENT
Start: 2024-03-26 | End: 2024-03-26

## 2024-03-26 RX ORDER — PANTOPRAZOLE SODIUM 40 MG/10ML
40 INJECTION, POWDER, LYOPHILIZED, FOR SOLUTION INTRAVENOUS DAILY
Status: DISCONTINUED | OUTPATIENT
Start: 2024-03-27 | End: 2024-03-28

## 2024-03-26 RX ORDER — HYDROMORPHONE HYDROCHLORIDE 1 MG/ML
0.4 INJECTION, SOLUTION INTRAMUSCULAR; INTRAVENOUS; SUBCUTANEOUS EVERY 4 HOURS PRN
Status: DISCONTINUED | OUTPATIENT
Start: 2024-03-26 | End: 2024-03-27

## 2024-03-26 RX ADMIN — PANTOPRAZOLE SODIUM 40 MG: 40 INJECTION, POWDER, FOR SOLUTION INTRAVENOUS at 09:03

## 2024-03-26 RX ADMIN — HYDROCORTISONE SODIUM SUCCINATE 50 MG: 100 INJECTION, POWDER, FOR SOLUTION INTRAMUSCULAR; INTRAVENOUS at 15:30

## 2024-03-26 RX ADMIN — PIPERACILLIN SODIUM AND TAZOBACTAM SODIUM 3.38 G: 3; .375 INJECTION, SOLUTION INTRAVENOUS at 10:53

## 2024-03-26 RX ADMIN — VANCOMYCIN HYDROCHLORIDE 1000 MG: 1 INJECTION, SOLUTION INTRAVENOUS at 05:04

## 2024-03-26 RX ADMIN — PIPERACILLIN SODIUM AND TAZOBACTAM SODIUM 3.38 G: 3; .375 INJECTION, SOLUTION INTRAVENOUS at 16:37

## 2024-03-26 RX ADMIN — EPINEPHRINE 0.08 MCG/KG/MIN: 1 INJECTION INTRAMUSCULAR; INTRAVENOUS; SUBCUTANEOUS at 13:23

## 2024-03-26 RX ADMIN — HYDROCORTISONE SODIUM SUCCINATE 50 MG: 100 INJECTION, POWDER, FOR SOLUTION INTRAMUSCULAR; INTRAVENOUS at 07:57

## 2024-03-26 RX ADMIN — CALCIUM CHLORIDE, MAGNESIUM CHLORIDE, DEXTROSE MONOHYDRATE, LACTIC ACID, SODIUM CHLORIDE, SODIUM BICARBONATE AND POTASSIUM CHLORIDE 2400 ML/HR: 3.68; 3.05; 22; 5.4; 6.46; 3.09; .314 INJECTION INTRAVENOUS at 02:45

## 2024-03-26 RX ADMIN — HEPARIN SODIUM 2300 UNITS/HR: 10000 INJECTION, SOLUTION INTRAVENOUS at 03:26

## 2024-03-26 RX ADMIN — PIPERACILLIN SODIUM AND TAZOBACTAM SODIUM 3.38 G: 3; .375 INJECTION, SOLUTION INTRAVENOUS at 22:46

## 2024-03-26 RX ADMIN — ALBUMIN HUMAN 25 G: 0.25 SOLUTION INTRAVENOUS at 20:24

## 2024-03-26 RX ADMIN — CALCIUM GLUCONATE 1 G: 20 INJECTION, SOLUTION INTRAVENOUS at 03:26

## 2024-03-26 RX ADMIN — Medication 1 APPLICATION: at 04:00

## 2024-03-26 RX ADMIN — IOHEXOL 170 ML: 350 INJECTION, SOLUTION INTRAVENOUS at 16:10

## 2024-03-26 RX ADMIN — HYDROMORPHONE HYDROCHLORIDE 0.2 MG: 1 INJECTION, SOLUTION INTRAMUSCULAR; INTRAVENOUS; SUBCUTANEOUS at 22:45

## 2024-03-26 RX ADMIN — NASAL DECONGESTANT 2 SPRAY: 0.05 SPRAY NASAL at 07:57

## 2024-03-26 RX ADMIN — VANCOMYCIN HYDROCHLORIDE 1000 MG: 1 INJECTION, SOLUTION INTRAVENOUS at 17:20

## 2024-03-26 RX ADMIN — Medication: at 20:00

## 2024-03-26 RX ADMIN — MUPIROCIN: 20 OINTMENT TOPICAL at 20:24

## 2024-03-26 RX ADMIN — CALCIUM CHLORIDE, MAGNESIUM CHLORIDE, DEXTROSE MONOHYDRATE, LACTIC ACID, SODIUM CHLORIDE, SODIUM BICARBONATE AND POTASSIUM CHLORIDE 2400 ML/HR: 3.68; 3.05; 22; 5.4; 6.46; 3.09; .314 INJECTION INTRAVENOUS at 02:44

## 2024-03-26 RX ADMIN — CALCIUM CHLORIDE, MAGNESIUM CHLORIDE, DEXTROSE MONOHYDRATE, LACTIC ACID, SODIUM CHLORIDE, SODIUM BICARBONATE AND POTASSIUM CHLORIDE 2400 ML/HR: 3.68; 3.05; 22; 5.4; 6.46; 3.09; .314 INJECTION INTRAVENOUS at 02:46

## 2024-03-26 RX ADMIN — SODIUM PHOSPHATE, MONOBASIC, MONOHYDRATE AND SODIUM PHOSPHATE, DIBASIC, ANHYDROUS 15 MMOL: 142; 276 INJECTION, SOLUTION INTRAVENOUS at 17:23

## 2024-03-26 RX ADMIN — ALBUMIN HUMAN 25 G: 0.25 SOLUTION INTRAVENOUS at 14:54

## 2024-03-26 RX ADMIN — CALCIUM GLUCONATE 2 G: 20 INJECTION, SOLUTION INTRAVENOUS at 14:58

## 2024-03-26 RX ADMIN — ALBUMIN HUMAN 25 G: 0.25 SOLUTION INTRAVENOUS at 09:03

## 2024-03-26 RX ADMIN — HYDROCORTISONE SODIUM SUCCINATE 50 MG: 100 INJECTION, POWDER, FOR SOLUTION INTRAMUSCULAR; INTRAVENOUS at 00:00

## 2024-03-26 RX ADMIN — ALBUMIN HUMAN 25 G: 0.25 SOLUTION INTRAVENOUS at 03:26

## 2024-03-26 RX ADMIN — HYDROCORTISONE SODIUM SUCCINATE 50 MG: 100 INJECTION, POWDER, FOR SOLUTION INTRAMUSCULAR; INTRAVENOUS at 23:31

## 2024-03-26 RX ADMIN — PIPERACILLIN SODIUM AND TAZOBACTAM SODIUM 3.38 G: 3; .375 INJECTION, SOLUTION INTRAVENOUS at 05:04

## 2024-03-26 RX ADMIN — MUPIROCIN: 20 OINTMENT TOPICAL at 09:03

## 2024-03-26 ASSESSMENT — PAIN - FUNCTIONAL ASSESSMENT
PAIN_FUNCTIONAL_ASSESSMENT: CPOT (CRITICAL CARE PAIN OBSERVATION TOOL)

## 2024-03-26 ASSESSMENT — PAIN SCALES - GENERAL: PAINLEVEL_OUTOF10: 0 - NO PAIN

## 2024-03-26 NOTE — CONSULTS
Inpatient consult to Vascular Medicine  Consult performed by: Debbie Abdul MD, MS  Consult ordered by: Gracie Skaggs, APRN-CNP  Reason for consult: presumed PE, known DVT; anticoagulation recommendations        History of Present Illness:    Jason Hollins is a 63 y.o. 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 On 3/20/2024 he had PEA arrest (unclear what initial rhythm was) with biventricular failure and RV septal bowing. He got IV tPA for presumed PE with ROSC. Right and left heart cath showed no evidence of PE or obstructive coronary disease. He developed hemorrhagic shock and went back to the OR for wound exploration and hematoma evacuation.    Heparin started 3/23.    Found to have left soleal DVT yesterday.    This morning hemoglobin was downtrending despite transfusion of a unit of blood overnight, so planning for CT scan today.    Team asking for long-term anticoagulation recommendations.    Patient remains intubated.    Per chart review, no personal history of VTE.    Past Medical History:   Diagnosis Date    Diabetes mellitus (CMS/HCC)     Hyperlipidemia     Morbid (severe) obesity due to excess calories (CMS/HCC) 05/31/2016    Severe obesity (BMI 35.0-39.9) with comorbidity     Past Surgical History:   Procedure Laterality Date    CT GUIDED PERCUTANEOUS BIOPSY MUSCLE  11/13/2023    CT GUIDED PERCUTANEOUS BIOPSY MUSCLE 11/13/2023 [facility] CT    INVASIVE VASCULAR PROCEDURE N/A 3/20/2024    Procedure: Pulmonary Angiogram;  Surgeon: Axel Wolfe MD;  Location: [place] [address] [facility];  Service: Cardiovascular;  Laterality: N/A;    OTHER SURGICAL HISTORY  05/31/2016    History Of Prior Surgery     Social History     Tobacco Use    Smoking status: Never    Smokeless tobacco: Never   Vaping Use    Vaping Use: Never used   Substance Use Topics    Alcohol use: Never    Drug use: Never         Family History:  Family History   Problem Relation Name Age of Onset    Coronary  artery disease Mother      Diabetes Mother      Hypertension Mother      Cancer Father      Diabetes Sister      Diabetes Brother      Diabetes Other Grandmother         Allergies:  Patient has no known allergies.    Inpatient Medications:  Scheduled medications   Medication Dose Route Frequency    albumin human  25 g intravenous q6h    hydrocortisone sodium succinate  50 mg intravenous q8h    mupirocin   Each Nostril BID    oxygen   inhalation Continuous - Inhalation    [START ON 3/27/2024] pantoprazole  40 mg intravenous Daily    piperacillin-tazobactam  3.375 g intravenous q6h    vancomycin  1,000 mg intravenous q12h     PRN medications   Medication    alteplase    calcium gluconate    calcium gluconate    dextrose    dextrose    dextrose    Or    glucagon    glucagon    insulin regular    magnesium sulfate    magnesium sulfate    potassium chloride    potassium chloride    vancomycin     Continuous Medications   Medication Dose Last Rate    EPINEPHrine  0-2 mcg/kg/min (Dosing Weight) 0.08 mcg/kg/min (03/26/24 1323)    epoprostenol  0.04 mcg/kg/min (Ideal) 0.05 mcg/kg/min (03/26/24 0840)    [Held by provider] heparin  0-4,000 Units/hr Stopped (03/26/24 0950)    insulin regular infusion for cardiac surgery  0-15 Units/hr 1 Units/hr (03/26/24 1200)    lactated Ringer's  5 mL/hr 5 mL/hr (03/26/24 1200)    norepinephrine  0.01-3 mcg/kg/min (Dosing Weight) 0.06 mcg/kg/min (03/26/24 1200)    PrismaSol 4/2.5  2,400 mL/hr 2,400 mL/hr (03/26/24 0246)     Outpatient Medications:  Current Outpatient Medications   Medication Instructions    aspirin 81 mg EC tablet 1 tablet, oral, Daily, AS DIRECTED.    atorvastatin (LIPITOR) 40 mg, oral, Daily    blood sugar diagnostic (Blood Glucose Test) strip Daily, One Drop Test In Vitro Strip; Test    chlorhexidine (Peridex) 0.12 % solution 15 mL, Mouth/Throat, See admin instructions, Use the night before and morning of surgery.    dulaglutide (TRULICITY) 4.5 mg/0.5 mL pen injector  "INJECT ONE PEN (4.5 MG) UNDER THE SKIN ONCE WEEKLY    ergocalciferol (VITAMIN D-2) 50,000 Units, oral, Every 14 days, twice monthly on the 15th and the 30 th for vitamin d deficiency    fish oil concentrate (Omega-3) 120-180 mg capsule 1 g, oral    gabapentin (Neurontin) 300 mg capsule Use the 100 mg capsule to titrate to 300 mg , then use the 300 mg capsule and take 1 to 2 capsules hs for pain    ibuprofen 800 mg, oral, 3 times daily PRN    metFORMIN (Glucophage) 1,000 mg tablet TAKE 1 TABLET BY MOUTH EVERY MORNING AND 1.5 TABLETS BY MOUTH EVERY EVENING ,    multivitamin tablet 1 tablet, oral, Daily    NIFEdipine ER (ADALAT CC) 30 mg, oral, Daily before breakfast, Do not crush, chew, or split.    OneTouch Ultra Test strip Test blood sugar once daily    sildenafil (VIAGRA) 100 mg, oral, Daily PRN    tiZANidine (ZANAFLEX) 4 mg, oral, 3 times daily    Trulicity 4.5 mg, subcutaneous, Weekly     Blood pressure 120/50, pulse 86, temperature 36.7 °C (98.1 °F), temperature source Esophageal, resp. rate 21, height 1.778 m (5' 10\"), weight 140 kg (308 lb 13.8 oz), SpO2 96 %.    Physical Exam:  General: intubated, off sedation, unresponsive  Head: normocephalic, atraumatic  ENT: moist mucous membranes, endotracheal tube and NG tube  Neck: supple  Lungs: coarse breath sounds  Heart: regular, normal S1 and S2, no murmurs, rubs or gallops  Abdomen: soft, non-distended  Extremities: no cyanosis or clubbing  Vascular: diffuse edema edema, pulses 1+, right thigh with wound vac  Musculoskeletal: no deformities  Neurological: unresponsive  Psychological: unable to assess  Skin: warm and dry, no rashes or lesions    Component      Latest Ref Rng 3/26/2024   POCT pH, Arterial      7.38 - 7.42 pH 7.42    POCT pH, Arterial       7.41    POCT pCO2, Arterial      38 - 42 mm Hg 33 (L)    POCT pCO2, Arterial       31 (L)    POCT pO2, Arterial      85 - 95 mm Hg 90    POCT pO2, Arterial       104 (H)    POCT SO2, Arterial      94 - 100 % 99  "   POCT SO2, Arterial       98    POCT Oxy Hemoglobin, Arterial      94.0 - 98.0 % 96.3    POCT Oxy Hemoglobin, Arterial       96.5    POCT Hematocrit Calculated, Arterial      41.0 - 52.0 % 24.0 (L)    POCT Hematocrit Calculated, Arterial       21.0 (L)    POCT Sodium, Arterial      136 - 145 mmol/L 130 (L)    POCT Sodium, Arterial       131 (L)    POCT Potassium, Arterial      3.5 - 5.3 mmol/L 4.5    POCT Potassium, Arterial       4.1    POCT Chloride, Arterial      98 - 107 mmol/L 98    POCT Chloride, Arterial       101    POCT Ionized Calcium, Arterial      1.10 - 1.33 mmol/L 1.07 (L)    POCT Ionized Calcium, Arterial       1.03 (L)    POCT Glucose, Arterial      74 - 99 mg/dL 150 (H)    POCT Glucose, Arterial       148 (H)    POCT Lactate, Arterial      0.4 - 2.0 mmol/L 3.8 (H)    POCT Lactate, Arterial       4.0 (HH)    POCT Base Excess, Arterial      -2.0 - 3.0 mmol/L -2.7 (L)    POCT Base Excess, Arterial       -4.5 (L)    POCT HCO3 Calculated, Arterial      22.0 - 26.0 mmol/L 21.4 (L)    POCT HCO3 Calculated, Arterial       19.6 (L)    POCT Hemoglobin, Arterial      13.5 - 17.5 g/dL 8.0 (L)    POCT Hemoglobin, Arterial       7.1 (L)    POCT Anion Gap, Arterial      10 - 25 mmo/L 15    POCT Anion Gap, Arterial       15    Patient Temperature      degrees Celsius 37.0    Patient Temperature       37.0    FiO2      % 50    FiO2       50    Peep CHM2O      cm H2O 14.0    WBC      4.4 - 11.3 x10*3/uL 21.4 (H)    WBC       21.5 (H)    nRBC      0.0 - 0.0 /100 WBCs 5.5 (H)    nRBC       4.8 (H)    RBC      4.50 - 5.90 x10*6/uL 2.55 (L)    RBC       2.35 (L)    HEMOGLOBIN      13.5 - 17.5 g/dL 7.6 (L)    HEMOGLOBIN       7.0 (L)    HEMATOCRIT      41.0 - 52.0 % 20.4 (L)    HEMATOCRIT       18.9 (L)    MCV      80 - 100 fL 80    MCV       80    MCH      26.0 - 34.0 pg 29.8    MCH       29.8    MCHC      32.0 - 36.0 g/dL 37.3 (H)    MCHC       37.0 (H)    RED CELL DISTRIBUTION WIDTH      11.5 - 14.5 % 15.9 (H)    RED  CELL DISTRIBUTION WIDTH       16.3 (H)    Platelets      150 - 450 x10*3/uL 54 (L)    Platelets       56 (L)        Platelets    Latest Ref Rng 150 - 450 x10*3/uL    3/5/2024 149 (L)  enoxaparin   3/6/2024 140 (L)  enoxaparin   3/7/2024 134 (L)  enoxaparin   3/8/2024 139 (L)  enoxaparin   3/9/2024 165  enoxaparin   3/10/2024 190  enoxaparin   3/11/2024  enoxaparin   3/12/2024  enoxaparin   3/13/2024 258  enoxaparin   3/14/2024  enoxaparin   3/15/2024 341  enoxaparin   3/16/2024  enoxaparin   3/17/2024  enoxaparin   3/18/2024  enoxaparin   3/19/2024  enoxaparin   3/20/2024 536 (H)  PEA arrest, UFH   3/21/2024 438  UFH    374      291      147 (L)      104 (L)      64 (L)     3/22/2024 78 (L)      90 (L)      82 (L)      82 (L)      92 (L)      87 (L)     3/23/2024 87 (L)  UFH    90 (L)     3/24/2024 73 (L)  UFH    77 (L)      80 (L)     3/25/2024 77 (L)  UFH    83 (L)      79 (L)     3/26/2024 66 (L)  Holding UFH    65 (L)      56 (L)      54 (L)        Legend:  (L) Low  (H) High    Cardiac catheterization/pulmonary angiogram 3/21/2024  CONCLUSIONS:   1. IVC venogram revealed no thrombus.   2. Bilateral pulmonary aniogram revealed no filling defect and adequate pulmonary flow.   3. Coronary angiogram in right dominant system with no obstructive disease.   4. Bilateral CFA angiogram - reasonable size but LFA high bifurcation.   5. 4/7F arterial/venous R groin sheaths left in event conversion needed for [facility].    Echo 3/21/2024  CONCLUSIONS:   1. Left ventricular systolic function is normal.   2. Poorly visualized anatomical structures due to suboptimal image quality.   3. Moderately enlarged right ventricle.   4. There is a slight suggestion of a Townsend's sign on the apical views suggestive of RV strain although the function is low normal.   5. There is low normal right ventricular systolic function.    Venous duplex upper ultrasound 3/24/2024    CONCLUSIONS:  Right Upper Venous: No evidence of acute deep vein  thrombus visualized in the right upper extremity. Edema noted in the arm.  Left Upper Venous: No evidence of acute deep vein thrombus visualized in the left upper extremity. Unable to obtain compressions of the subclavian vein due to pt's positioning and body habitus; however there is good color filling and spontaneous and phasic flow noted. Edema noted in the arm.    Venous duplex ultrasound lower 3/24/2024   **CRITICAL RESULT**  Critical Result: Acute DVT in right soleal vein.  Notification called to Fox GARCÍA on 3/25/2024 at 11:48:07 AM by Pedro Humphries RVT.     CONCLUSIONS:  Right Lower Venous: There is acute occlusive deep vein thrombosis visualized in the soleal vein. Cannot rule out thrombus in non-visualized iliac, common femoral and profunda femoral veins. Unable to visualize the external iliac and common femoral veins due to bandage in pt's groin.  Left Lower Venous: No evidence of acute deep vein thrombus visualized in the left lower extremity. Cannot rule out thrombus in non-visualized iliac, common femoral and profunda femoral veins. Unable to visualize the external iliac and common femoral veins due to a line and bandage in pt's groin.       Assessment/Plan   64 y/o man with recent extensive right leg surgery for myxoid chondrosarcoma. Doing well until 3/20/2024 when he had cardiac arrest concerning for PE versus STEMI. Received systemic fibrinolysis with ROSC, complicated later by hemorrhagic shock requiring reoperation. Had PA, left/right heart cath that showed no PE or significant CAD, so assumption is CPR and tPA dispersed/lysed all pulmonary embolism.     I'm concerned about his platelets. He received prophylaxis with enoxaparin, which is much less likely to cause HIT than unfractionated heparin, but HIT could explain massive PE. (But surgery plus cancer can cause massive PE too). Timing is a little delayed--platelets fell at about day 12 of LMWH exposure. Nevertheless, would suggest  checking anti-PF4.    He is off heparin at this moment due to concern for bleeding.    Vascular medicine will follow and make further recommendations.    Debbie Abdul MD, MS

## 2024-03-26 NOTE — PROGRESS NOTES
Occupational Therapy      Therapy Communication Note    Patient Name: Jason Hollins  MRN: [id number]  Today's Date: 3/26/2024     Discipline: Occupational Therapy    Missed Visit Reason: Missed Visit Reason: Patient placed on medical hold (Plan for CT for suspected bleeding. Defer OT at this time.)    Missed Time: Attempt at 1212    Fe Whitehead OT  03/26/2024

## 2024-03-26 NOTE — PROGRESS NOTES
"  03/25 REVIEW  Continues requiring ICU loc - still intubated (5d), on CVVH, and now Palliative Oncology consulted 3/24 for family support. Not clear if dc plan will remain for SNF, [facility] or [facility].        03/22 1045  Services for patient keep changing now entirely on Plastics (CTICU) and per today's Plastic Surgery -- \"Remains critically ill in CTICU,  CRRT initiated overnight. Remains intubated/sedated with levo, vaso, epi, angiotensin II running Overnight Events Dialysis line placed, CVVHD initiated. Reviewed above notes copied to this progress note.... +added updated clinicals in [facility] and provided SNFs - [facility] update. To continue to follow and if SNF still dc plan, obtain foc from patient/family when appropritate.     Chantelle Ramirez (LSW, MSW)       "

## 2024-03-26 NOTE — CARE PLAN
Problem: Safety - Medical Restraint  Goal: Remains free of injury from restraints (Restraint for Interference with Medical Device)  3/26/2024 0626 by Susana Gates RN  Outcome: Progressing  3/25/2024 2009 by Susana Gates RN  Outcome: Progressing  Goal: Free from restraint(s) (Restraint for Interference with Medical Device)  3/26/2024 0626 by Susana Gates RN  Outcome: Progressing  3/25/2024 2009 by Susana Gates RN  Outcome: Progressing   The patient's goals for the shift include  remain free from harm    The clinical goals for the shift include Pt will work with PT this shift    Over the shift, the patient did not make progress toward the following goals. Barriers to progression include inability to follow commands. Recommendations to address these barriers include continue to check neuro status  .

## 2024-03-26 NOTE — CONSULTS
"Nutrition Initial Assessment:   Nutrition Assessment         Patient is a 63 y.o. male presented with soft tissue sarcoma, while on bed rest during post-op recovery 3/20, pt decompensated into PEA transferred to CTICU.    Currently intubated but off sedation. CVVH started 3/22.    PMH: DM, hyperlipidemia      Nutrition History:  Food and Nutrient History: Unable to obtain prior nutrition history as pt is currently intubated. Pt has been NPO since 3/20 with no nutrition support.       Anthropometrics:  Height: 177.8 cm (5' 10\")   Weight: 140 kg (308 lb 13.8 oz)   BMI (Calculated): 44.32  IBW/kg (Dietitian Calculated): 75.5 kg  Percent of IBW: 185 %       Weight History:   Wt Readings from Last 10 Encounters:   03/25/24 140 kg (308 lb 13.8 oz)   02/28/24 120 kg (264 lb)   02/28/24 120 kg (264 lb 1.6 oz)   02/26/24 119 kg (262 lb 12.8 oz)   02/07/24 118 kg (261 lb)   01/17/24 118 kg (260 lb 9.3 oz)   01/10/24 119 kg (261 lb 12.7 oz)   01/31/24 118 kg (261 lb)   01/03/24 119 kg (263 lb 0.1 oz)   12/27/23 121 kg (267 lb 10.2 oz)      Date/Time Weight   03/25/24 0600 140 kg (308 lb 13.8 oz)   03/24/24 0600 137 kg (302 lb 4 oz)   03/22/24 0800 --   03/22/24 0600 129 kg (284 lb 2.8 oz)       Weight Change %:  Weight History / % Weight Change: Weight appears elevated, likely d/t current edema. Pt's weight from 3/22 (129kg) is likely closer to true body weight.  Significant Weight Gain: Fluid related    Nutrition Focused Physical Exam Findings:  defer: not appropriate at this time, +2 edema  Subcutaneous Fat Loss:      Muscle Wasting:     Edema:  Edema: +2 mild  Edema Location: generalized  Physical Findings:  Skin: Positive (surgical wounds, wound vac on upper R leg)    Nutrition Significant Labs:  CBC Trend:   Results from last 7 days   Lab Units 03/26/24  0920 03/26/24  0330 03/26/24  0008 03/25/24  1408   WBC AUTO x10*3/uL 21.5* 21.3* 21.7* 21.5*   RBC AUTO x10*6/uL 2.35* 2.55* 2.60* 2.55*   HEMOGLOBIN g/dL 7.0* 7.4* 7.6* " 7.6*   HEMATOCRIT % 18.9* 20.9* 21.3* 20.8*   MCV fL 80 82 82 82   PLATELETS AUTO x10*3/uL 56* 65* 66* 79*    , BG POCT trend:   Results from last 7 days   Lab Units 03/26/24  0804 03/26/24  0623 03/26/24  0244 03/25/24  2207 03/25/24 2020   POCT GLUCOSE mg/dL 162* 153* 153* 150* 166*    , Liver Function Trend:   Results from last 7 days   Lab Units 03/26/24  0008 03/25/24  0452 03/24/24  0005 03/23/24  0501   ALK PHOS U/L 111 123 105 90   AST U/L 759* 1,253* 1,550* 2,289*   ALT U/L 1,152* 1,524* 1,714* 1,826*   BILIRUBIN TOTAL mg/dL 10.6* 8.8* 5.7* 4.9*    , Renal Lab Trend:   Results from last 7 days   Lab Units 03/26/24  0008 03/25/24  1408 03/25/24  0223 03/24/24  1244   POTASSIUM mmol/L 4.4 4.2 4.1 3.9   PHOSPHORUS mg/dL 2.8 3.1 3.2 3.5   SODIUM mmol/L 134* 132* 133* 134*   MAGNESIUM mg/dL 2.29 2.28 2.15 1.94   EGFR mL/min/1.73m*2 35* 35* 33* 30*   BUN mg/dL 24* 23 21 21   CREATININE mg/dL 2.10* 2.09* 2.22* 2.36*    , Lipid Panel:   Lab Results   Component Value Date    CHOL 89 12/30/2022    HDL 31.5 (A) 12/30/2022    CHHDL 2.8 12/30/2022    LDLF 40 12/30/2022    VLDL 18 12/30/2022    TRIG 595 (H) 03/22/2024        Nutrition Specific Medications:  Scheduled medications  albumin human, 25 g, intravenous, q6h  hydrocortisone sodium succinate, 50 mg, intravenous, q8h  mupirocin, , Each Nostril, BID  oxygen, , inhalation, Continuous - Inhalation  [START ON 3/27/2024] pantoprazole, 40 mg, intravenous, Daily  piperacillin-tazobactam, 3.375 g, intravenous, q6h  vancomycin, 1,000 mg, intravenous, q12h      Continuous medications  EPINEPHrine, 0-2 mcg/kg/min (Dosing Weight), Last Rate: 0.08 mcg/kg/min (03/26/24 1000)  epoprostenol, 0.04 mcg/kg/min (Ideal), Last Rate: 0.05 mcg/kg/min (03/26/24 0840)  insulin regular infusion for cardiac surgery, 0-15 Units/hr, Last Rate: 1 Units/hr (03/26/24 1000)  lactated Ringer's, 5 mL/hr, Last Rate: 5 mL/hr (03/26/24 1000)  norepinephrine, 0.01-3 mcg/kg/min (Dosing Weight), Last  Rate: 0.06 mcg/kg/min (03/26/24 1000)  PrismaSol 4/2.5, 2,400 mL/hr, Last Rate: 2,400 mL/hr (03/26/24 0246)    I/O:   Last BM Date: 03/22/24; Stool Appearance: Loose (03/22/24 0600)    Dietary Orders (From admission, onward)       Start     Ordered    03/20/24 1826  NPO Diet; Effective now  Diet effective now         03/20/24 1827                Estimated Needs:   Total Energy Estimated Needs (kCal):  (9967-1244)  Method for Estimating Needs: hypocaloric feeding (11-14kcal/kg) using 129kg weight from 3/22  Total Protein Estimated Needs (g):  (113-151)  Method for Estimating Needs: 1.5-2 g/kg  Total Fluid Estimated Needs (mL):  (per team)           Nutrition Diagnosis   Malnutrition Diagnosis  Patient has Malnutrition Diagnosis: Yes  Diagnosis Status: New  Malnutrition Diagnosis: Moderate malnutrition related to acute disease or injury  As Evidenced by: <50% of estimated energy requirements for >5days (no intake x 6 days), +2 edema, current critically ill state.  Additional Assessment Information: Pt's malnutrition is likely acute-on-chronic, though significant decline in nutrition status is currently an acute issue. Degree of malnutrition may be severe, however d/t pt's current elevated weight and inability to gather accurate information from physical exam, pt does not currently meet severe criteria.            Nutrition Interventions/Recommendations         Nutrition Prescription:  Individualized Nutrition Prescription Provided for : TPN and TF recommendations    Risk for refeeding -- recommend thiamine supplementation, 100mg x 5-7 days    TPN recs:  Clinimix 8/14, include MVI + trace elements  Initiate @ 40 ml/hr  Once lytes stablize for 24-48 hours, increase to goal of 70 ml/hr  SMOF lipids @ 21 ml/hr x 12 hours  Monitor RFP+Mag every 12 hours and replete as needed    TF recs  Pivot 1.5  + 1 pkt Pro-stat AWC  Goal rate 45 ml  Initiate @ 10ml/hr, increase by 10-15ml/hr  to goal every 8-12 hours with no sign of  intolerance.          Nutrition Interventions:   Interventions: Enteral intake, Parenteral nutrition/ IV fluids  Goal: TPN @ goal provides 1930ml, 1838 kcal, 134 g protein     ---    TF @ goal provides 1080ml, 1720 kcal, 118g protein  Additional Interventions: TPN: Clinimix 8/14 @ 70ml/hr continuous, SMOF lipids @ 21 ml/hr x 12 hours, TF: Pivot 1.5 @ 45 ml/hr continuous + 1 pkt Pro-stat AWC         Nutrition Monitoring and Evaluation   Food/Nutrient Related History Monitoring  Monitoring and Evaluation Plan: Enteral and parenteral nutrition intake  Enteral and Parenteral Nutrition Intake: Enteral nutrition intake, Parenteral nutrition intake  Criteria: initiation of nutition support within 48 hours    Body Composition/Growth/Weight History  Monitoring and Evaluation Plan: Weight  Criteria: monitor weight    Biochemical Data, Medical Tests and Procedures  Monitoring and Evaluation Plan: Electrolyte/renal panel, Glucose/endocrine profile  Electrolyte and Renal Panel: Potassium, Phosphorus, Magnesium  Criteria: lytes WNL -- replete as needed  Criteria: -180mg/dL          Time Spent/Follow-up Reminder:   Time Spent (min): 90 minutes  Last Date of Nutrition Visit: 03/26/24  Nutrition Follow-Up Needed?: Dietitian to reassess per policy  Follow up Comment: TPN and TF recs provided

## 2024-03-26 NOTE — PROGRESS NOTES
Jason Hollins is a 63 y.o. male on day 21 of admission presenting with Soft tissue sarcoma (CMS/HCC).    Subjective   Vasopressin and fentanyl infusions weaned off overnight. Patient remains on epi 0.08, levo 0.06, and iEpo 0.05. Lactate downtrending. Remains intubated on CVVH, pulling 120ml/hr. Hgb continues to downtrend despite receiving 1u PRBC yesterday evening. Positive cough and gag on exam. PERRL. Unable to arouse. Not withdrawing to noxious stimuli.    Objective     Physical Exam  Vitals reviewed.   Constitutional:       Appearance: He is ill-appearing.      Interventions: He is sedated, intubated and restrained.      Comments: Intubated. Unable to arouse.   HENT:      Head:      Comments: Edematous face/head/neck     Mouth/Throat:      Mouth: Mucous membranes are moist.      Comments: ETT in place. OG tube present to LIWS, bloody drainage.  Eyes:      General: Scleral icterus present.      Pupils: Pupils are equal, round, and reactive to light.      Comments: Conjunctival edema. Subconjunctival hemorrhage on left.   Cardiovascular:      Rate and Rhythm: Normal rate and regular rhythm.      Pulses:           Radial pulses are 1+ on the right side and 1+ on the left side.        Dorsalis pedis pulses are 1+ on the right side and 1+ on the left side.      Heart sounds: Normal heart sounds.   Pulmonary:      Effort: He is intubated.      Comments: Mechanically ventilated via ETT. Scattered rhonchi, diminished breath sounds bilaterally. Equal chest rise.  Abdominal:      General: Abdomen is protuberant. Bowel sounds are absent. There is distension.      Palpations: Abdomen is soft.      Comments: Obese abdomen.   Genitourinary:     Comments: Penile edema.  Skin:     General: Skin is warm and dry.      Comments: Right flap site with wound VAC, no output. ANDERSON x 3 all with serosanguinous output.   Neurological:      Mental Status: He is unresponsive.      GCS: GCS eye subscore is 1. GCS verbal subscore is 1. GCS motor  "subscore is 1.      Comments: Intubated. Unable to arouse. Positive cough and gag on exam. Not withdrawing to noxious stimuli.         Last Recorded Vitals  Blood pressure 145/62, pulse 78, temperature 36.5 °C (97.7 °F), temperature source Esophageal, resp. rate 21, height 1.778 m (5' 10\"), weight 140 kg (308 lb 13.8 oz), SpO2 96 %.    VS over last 24h  Heart Rate:  [69-89]   Temp:  [36 °C (96.8 °F)-36.5 °C (97.7 °F)]   Resp:  [18-28]   BP: (130-145)/(53-62)   SpO2:  [92 %-99 %]    Intake/Output last 3 Shifts:  I/O last 3 completed shifts:  In: 4665.1 (33.3 mL/kg) [I.V.:3569.1 (25.5 mL/kg); Blood:391; IV Piggyback:705]  Out: 4984 (35.6 mL/kg) [Emesis/NG output:50; Drains:855; Other:4079]  Weight: 140.1 kg       Intake/Output Summary (Last 24 hours) at 3/26/2024 1035  Last data filed at 3/26/2024 1000  Gross per 24 hour   Intake 2375.08 ml   Output 3359 ml   Net -983.92 ml       Relevant Results  Results for orders placed or performed during the hospital encounter of 03/05/24 (from the past 24 hour(s))   POCT GLUCOSE   Result Value Ref Range    POCT Glucose 141 (H) 74 - 99 mg/dL   POCT GLUCOSE   Result Value Ref Range    POCT Glucose 142 (H) 74 - 99 mg/dL   Blood Gas Arterial Full Panel   Result Value Ref Range    POCT pH, Arterial 7.35 (L) 7.38 - 7.42 pH    POCT pCO2, Arterial 33 (L) 38 - 42 mm Hg    POCT pO2, Arterial 95 85 - 95 mm Hg    POCT SO2, Arterial 99 94 - 100 %    POCT Oxy Hemoglobin, Arterial 96.4 94.0 - 98.0 %    POCT Hematocrit Calculated, Arterial 23.0 (L) 41.0 - 52.0 %    POCT Sodium, Arterial 130 (L) 136 - 145 mmol/L    POCT Potassium, Arterial 4.2 3.5 - 5.3 mmol/L    POCT Chloride, Arterial 101 98 - 107 mmol/L    POCT Ionized Calcium, Arterial 1.03 (L) 1.10 - 1.33 mmol/L    POCT Glucose, Arterial 132 (H) 74 - 99 mg/dL    POCT Lactate, Arterial 5.5 (HH) 0.4 - 2.0 mmol/L    POCT Base Excess, Arterial -6.7 (L) -2.0 - 3.0 mmol/L    POCT HCO3 Calculated, Arterial 18.2 (L) 22.0 - 26.0 mmol/L    POCT " Hemoglobin, Arterial 7.6 (L) 13.5 - 17.5 g/dL    POCT Anion Gap, Arterial 15 10 - 25 mmo/L    Patient Temperature 37.0 degrees Celsius    FiO2 50 %   CBC   Result Value Ref Range    WBC 21.5 (H) 4.4 - 11.3 x10*3/uL    nRBC 4.1 (H) 0.0 - 0.0 /100 WBCs    RBC 2.55 (L) 4.50 - 5.90 x10*6/uL    Hemoglobin 7.6 (L) 13.5 - 17.5 g/dL    Hematocrit 20.8 (L) 41.0 - 52.0 %    MCV 82 80 - 100 fL    MCH 29.8 26.0 - 34.0 pg    MCHC 36.5 (H) 32.0 - 36.0 g/dL    RDW 15.9 (H) 11.5 - 14.5 %    Platelets 79 (L) 150 - 450 x10*3/uL   Magnesium   Result Value Ref Range    Magnesium 2.28 1.60 - 2.40 mg/dL   Renal Function Panel   Result Value Ref Range    Glucose 144 (H) 74 - 99 mg/dL    Sodium 132 (L) 136 - 145 mmol/L    Potassium 4.2 3.5 - 5.3 mmol/L    Chloride 96 (L) 98 - 107 mmol/L    Bicarbonate 20 (L) 21 - 32 mmol/L    Anion Gap 20 10 - 20 mmol/L    Urea Nitrogen 23 6 - 23 mg/dL    Creatinine 2.09 (H) 0.50 - 1.30 mg/dL    eGFR 35 (L) >60 mL/min/1.73m*2    Calcium 8.3 (L) 8.6 - 10.6 mg/dL    Phosphorus 3.1 2.5 - 4.9 mg/dL    Albumin 3.5 3.4 - 5.0 g/dL   Calcium, ionized   Result Value Ref Range    POCT Calcium, Ionized 1.07 (L) 1.1 - 1.33 mmol/L   Fibrinogen   Result Value Ref Range    Fibrinogen 306 200 - 400 mg/dL   Coagulation Screen   Result Value Ref Range    Protime 20.2 (H) 9.8 - 12.8 seconds    INR 1.8 (H) 0.9 - 1.1    aPTT >200 (HH) 27 - 38 seconds   Prepare RBC: 1 Units   Result Value Ref Range    PRODUCT CODE [id number]     Unit Number [id number]     Unit ABO O     Unit RH POS     XM INTEP COMP     Dispense Status TR     Blood Expiration Date April 06, 2024 23:59 EDT     PRODUCT BLOOD TYPE 5100     UNIT VOLUME 291    POCT GLUCOSE   Result Value Ref Range    POCT Glucose 140 (H) 74 - 99 mg/dL   Type and screen   Result Value Ref Range    ABO TYPE O     Rh TYPE POS     ANTIBODY SCREEN NEG    POCT GLUCOSE   Result Value Ref Range    POCT Glucose 154 (H) 74 - 99 mg/dL   POCT GLUCOSE   Result Value Ref Range    POCT Glucose  166 (H) 74 - 99 mg/dL   POCT GLUCOSE   Result Value Ref Range    POCT Glucose 150 (H) 74 - 99 mg/dL   CBC   Result Value Ref Range    WBC 21.7 (H) 4.4 - 11.3 x10*3/uL    nRBC 4.2 (H) 0.0 - 0.0 /100 WBCs    RBC 2.60 (L) 4.50 - 5.90 x10*6/uL    Hemoglobin 7.6 (L) 13.5 - 17.5 g/dL    Hematocrit 21.3 (L) 41.0 - 52.0 %    MCV 82 80 - 100 fL    MCH 29.2 26.0 - 34.0 pg    MCHC 35.7 32.0 - 36.0 g/dL    RDW 16.1 (H) 11.5 - 14.5 %    Platelets 66 (L) 150 - 450 x10*3/uL   Coagulation Screen   Result Value Ref Range    Protime 19.2 (H) 9.8 - 12.8 seconds    INR 1.7 (H) 0.9 - 1.1    aPTT 198 (HH) 27 - 38 seconds   Magnesium   Result Value Ref Range    Magnesium 2.29 1.60 - 2.40 mg/dL   Calcium, ionized   Result Value Ref Range    POCT Calcium, Ionized 1.05 (L) 1.1 - 1.33 mmol/L   Heparin Assay, UFH   Result Value Ref Range    Heparin Unfractionated 0.3 See Comment Below for Therapeutic Ranges IU/mL   Hepatic function panel   Result Value Ref Range    Albumin 3.6 3.4 - 5.0 g/dL    Bilirubin, Total 10.6 (H) 0.0 - 1.2 mg/dL    Bilirubin, Direct 6.7 (H) 0.0 - 0.3 mg/dL    Alkaline Phosphatase 111 33 - 136 U/L    ALT 1,152 (H) 10 - 52 U/L     (H) 9 - 39 U/L    Total Protein 4.8 (L) 6.4 - 8.2 g/dL   Basic Metabolic Panel   Result Value Ref Range    Glucose 154 (H) 74 - 99 mg/dL    Sodium 134 (L) 136 - 145 mmol/L    Potassium 4.4 3.5 - 5.3 mmol/L    Chloride 98 98 - 107 mmol/L    Bicarbonate 20 (L) 21 - 32 mmol/L    Anion Gap 20 10 - 20 mmol/L    Urea Nitrogen 24 (H) 6 - 23 mg/dL    Creatinine 2.10 (H) 0.50 - 1.30 mg/dL    eGFR 35 (L) >60 mL/min/1.73m*2    Calcium 8.2 (L) 8.6 - 10.6 mg/dL   Phosphorus   Result Value Ref Range    Phosphorus 2.8 2.5 - 4.9 mg/dL   Creatine Kinase   Result Value Ref Range    Creatine Kinase 514 (H) 0 - 325 U/L   Blood Gas Arterial Full Panel   Result Value Ref Range    POCT pH, Arterial 7.38 7.38 - 7.42 pH    POCT pCO2, Arterial 34 (L) 38 - 42 mm Hg    POCT pO2, Arterial 83 (L) 85 - 95 mm Hg    POCT  SO2, Arterial 97 94 - 100 %    POCT Oxy Hemoglobin, Arterial 95.3 94.0 - 98.0 %    POCT Hematocrit Calculated, Arterial 24.0 (L) 41.0 - 52.0 %    POCT Sodium, Arterial 131 (L) 136 - 145 mmol/L    POCT Potassium, Arterial 4.3 3.5 - 5.3 mmol/L    POCT Chloride, Arterial 97 (L) 98 - 107 mmol/L    POCT Ionized Calcium, Arterial 1.05 (L) 1.10 - 1.33 mmol/L    POCT Glucose, Arterial 138 (H) 74 - 99 mg/dL    POCT Lactate, Arterial 4.7 (HH) 0.4 - 2.0 mmol/L    POCT Base Excess, Arterial -4.5 (L) -2.0 - 3.0 mmol/L    POCT HCO3 Calculated, Arterial 20.1 (L) 22.0 - 26.0 mmol/L    POCT Hemoglobin, Arterial 8.0 (L) 13.5 - 17.5 g/dL    POCT Anion Gap, Arterial 18 10 - 25 mmo/L    Patient Temperature 37.0 degrees Celsius    FiO2 50 %   POCT GLUCOSE   Result Value Ref Range    POCT Glucose 153 (H) 74 - 99 mg/dL   CBC   Result Value Ref Range    WBC 21.3 (H) 4.4 - 11.3 x10*3/uL    nRBC 4.8 (H) 0.0 - 0.0 /100 WBCs    RBC 2.55 (L) 4.50 - 5.90 x10*6/uL    Hemoglobin 7.4 (L) 13.5 - 17.5 g/dL    Hematocrit 20.9 (L) 41.0 - 52.0 %    MCV 82 80 - 100 fL    MCH 29.0 26.0 - 34.0 pg    MCHC 35.4 32.0 - 36.0 g/dL    RDW 16.3 (H) 11.5 - 14.5 %    Platelets 65 (L) 150 - 450 x10*3/uL   POCT GLUCOSE   Result Value Ref Range    POCT Glucose 153 (H) 74 - 99 mg/dL   POCT GLUCOSE   Result Value Ref Range    POCT Glucose 162 (H) 74 - 99 mg/dL   CBC   Result Value Ref Range    WBC 21.5 (H) 4.4 - 11.3 x10*3/uL    nRBC 4.8 (H) 0.0 - 0.0 /100 WBCs    RBC 2.35 (L) 4.50 - 5.90 x10*6/uL    Hemoglobin 7.0 (L) 13.5 - 17.5 g/dL    Hematocrit 18.9 (L) 41.0 - 52.0 %    MCV 80 80 - 100 fL    MCH 29.8 26.0 - 34.0 pg    MCHC 37.0 (H) 32.0 - 36.0 g/dL    RDW 16.3 (H) 11.5 - 14.5 %    Platelets 56 (L) 150 - 450 x10*3/uL   Blood Gas Arterial Full Panel   Result Value Ref Range    POCT pH, Arterial 7.41 7.38 - 7.42 pH    POCT pCO2, Arterial 31 (L) 38 - 42 mm Hg    POCT pO2, Arterial 104 (H) 85 - 95 mm Hg    POCT SO2, Arterial 98 94 - 100 %    POCT Oxy Hemoglobin, Arterial  96.5 94.0 - 98.0 %    POCT Hematocrit Calculated, Arterial 21.0 (L) 41.0 - 52.0 %    POCT Sodium, Arterial 131 (L) 136 - 145 mmol/L    POCT Potassium, Arterial 4.1 3.5 - 5.3 mmol/L    POCT Chloride, Arterial 101 98 - 107 mmol/L    POCT Ionized Calcium, Arterial 1.03 (L) 1.10 - 1.33 mmol/L    POCT Glucose, Arterial 148 (H) 74 - 99 mg/dL    POCT Lactate, Arterial 4.0 (HH) 0.4 - 2.0 mmol/L    POCT Base Excess, Arterial -4.5 (L) -2.0 - 3.0 mmol/L    POCT HCO3 Calculated, Arterial 19.6 (L) 22.0 - 26.0 mmol/L    POCT Hemoglobin, Arterial 7.1 (L) 13.5 - 17.5 g/dL    POCT Anion Gap, Arterial 15 10 - 25 mmo/L    Patient Temperature 37.0 degrees Celsius    FiO2 50 %   Prepare RBC: 1 Units   Result Value Ref Range    PRODUCT CODE [id number]     Unit Number [id number]     Unit ABO O     Unit RH POS     XM INTEP COMP     Dispense Status XM     Blood Expiration Date April 22, 2024 23:59 EDT     PRODUCT BLOOD TYPE 5100     UNIT VOLUME 350      Scheduled medications  albumin human, 25 g, intravenous, q6h  hydrocortisone sodium succinate, 50 mg, intravenous, q8h  mupirocin, , Each Nostril, BID  oxygen, , inhalation, Continuous - Inhalation  [START ON 3/27/2024] pantoprazole, 40 mg, intravenous, Daily  piperacillin-tazobactam, 3.375 g, intravenous, q6h  vancomycin, 1,000 mg, intravenous, q12h      Continuous medications  EPINEPHrine, 0-2 mcg/kg/min (Dosing Weight), Last Rate: 0.08 mcg/kg/min (03/26/24 1000)  epoprostenol, 0.04 mcg/kg/min (Ideal), Last Rate: 0.05 mcg/kg/min (03/26/24 0840)  [Held by provider] heparin, 0-4,000 Units/hr, Last Rate: Stopped (03/26/24 0950)  insulin regular infusion for cardiac surgery, 0-15 Units/hr, Last Rate: 1 Units/hr (03/26/24 1000)  lactated Ringer's, 5 mL/hr, Last Rate: 5 mL/hr (03/26/24 1000)  norepinephrine, 0.01-3 mcg/kg/min (Dosing Weight), Last Rate: 0.06 mcg/kg/min (03/26/24 1000)  PrismaSol 4/2.5, 2,400 mL/hr, Last Rate: 2,400 mL/hr (03/26/24 0246)      PRN medications  PRN medications:  alteplase, calcium gluconate, calcium gluconate, dextrose, dextrose, dextrose **OR** glucagon, glucagon, insulin regular, magnesium sulfate, magnesium sulfate, potassium chloride, potassium chloride, vancomycin            Assessment/Plan   Principal Problem:    Soft tissue sarcoma (CMS/HCC)  Active Problems:    Extraskeletal myxoid chondrosarcoma (CMS/HCC)    Cardiopulmonary arrest (CMS/HCC)    Assessment:  Jason Hollins is a 63 y.o. 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 Hawkins and Dr. Smith on 3/5/24.  Post operative course was uncomplicated and patient was on RNF until 3/20 for prolonged bed rest to avoid hip flexion to maintain flap integrity.  Patient was on prophylactic lovenox while on bedrest.     3/20: Cardiopulmonary arrest s/p CPR with ROSC after TPA, overnight started on heparin, epo, increased pressor requirements, increased swelling at flap site.     3/21: Hemorrhagic shock, MTP. Firm and large right flap site. Return to OR s/p Exploration of right thigh wound, Evacuation of hematoma. Suture ligation of multiple bleeding vessel and several points in gluteal area.     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 Hawkins and Dr. Smith on 3/5/24 s/p cardiopulmonary arrest with ROSC 3/20. Documented to have followed commands off sedation. Acute post op pain. CT head 3/22 without acute process. Weaned off cisatracurium and propofol overnight 3/23-3/24. Ketamine weaned off 3/25 and Fentanyl weaned off 3/26 am.  - obtain CT Head w/o contrast  - ongoing neuro and pain assessments  - PT/OT -> PROM  - Restraints  indicated while patient remains intubated, restrain with soft wrist restraints until medical devices discontinued      CV: History of HTN, HLD. Acute cardiopulmonary arrest 2/2 to possible massive PE vs massive STEMI, received multiple rounds of CPR and one defibrillation.  Sustained ROSC achieved after TPA bolus. On high dose levophed, epinephrine, vaso, angioT2. Plan on 3/21 was for ECMO which was aborted secondary to large hemhorrge at right flap site. Had MTP and taken OR with 5L evacuated. ECHO 3/21: Normal LV, Mod enlarged RV, slight suggestion of a Townsend's sign / RV strain, low normal RV function. ECHO 3/22 with hyperdynamic LV. New onset Afib with RVR overnight 3/22-3/23, dosed with 150mg amio x2, started infusion at 1mg/min thereafter. AT2 weaned off. Amio infusion discontinued 3/25. Lactate downtrending. Vasopressin weaned off overnight. Patient remains on Epi 0.08, Levo 0.06, and iEpo 0.05. Remains in NSR.  - continuous EKG/abp monitoring  - Goal map range 65-90  - wean pressors as able  - start to wean iEpo -> decrease to 0.04  - continue SDS q8h  - complete scheduled Albumin 25%  - discontinue heparin infusion (see Heme)  - continue to trend lactate  - continue fluid removal at 120ml/hr with CVVH (became hypotensive this am during rounds when attempting to remove 180ml/hr)     PULM: Arrived to SICU intubated julio c-CPR. Concern for massive PE. Started on iEpo, currently on 0.05. Hypoxic respiratory failure. Severe ARDS. Currently on AC/VC 50%, PEEP 14. ETT exchanged 3/23.  - ARDSnet lung protective strategies  - Wean FiO2 as tolerated. Consider wean to 40%    - keep peep at 14  - iEpo as above in CV  - Frequent ABGs  - additional pulm toilet prn  - daily CXR    ENT: Had epistaxis 3/23, started on afrin bid x3 days  - monitor, low threshold to consult ENT  - avoid NG placement for now     GI: NPO. Shock liver, pancreatitis. Elevated TG. Elevated lactate. Consulted ACS for potential bowel ischemia as  cause of persistent lactic acidosis. CT imaging 3/22 with evidence of pancreatitis. No acute surgical indication per ACS. RUQ US 3/22 with thickening of GB wall without cholelithiasis. Hyperbilirubinemia worse, up to 8.8 today. Lipase now WNL. Bloody output from OG.  - NPO  - keep OG to LIWS -> will consider starting trickle TFs pending CT C/A/P  - nutrition consult for TF vs TPN recs  - decrease PPI to daily for GI ppx  - Trend LFTs daily  - stop trending amylase and lipase  - no enteral feeds or bowel reg yet     : No history of renal disease. Baseline creatinine 0.6. Anuric RUTH. Elevated CK, downtrending. Metabolic acidosis, total body fluid overload, hyperkalemia. Started on CVVH 3/21, stopped bicarb infusion 3/22. Marino removed 3/24. Hyponatremia. Net negative 641ml for past 24hrs.  - continue CVVH -> fluid removal goal negative 500ml to 1L  - RFP BID and PRN  - Replete electrolytes judiciously  - appreciate Nephrology involvement  - trend CK daily     HEME: Patient was on ppx lovenox for DVT prophylaxis prior to cardiopulmonary arrest. Concern for massive PE patient received bolus of TPA during CPR. Started on heparin infusion overnight given concern for PE. Hemorrhagic shock 3/21, MTP and back to OR s/p Exploration of right thigh woundEvacuation of hematoma. Suture ligation of multiple bleeding vessel and several points in gluteal area. Hematology consulted 3/22 to r/o HLH. Transfused 1 RBC overnight 3/22-3/23. Thrombocytopenia, coagulapathy. LE Duplex 3/25 +acute occlusive R soleal DVT. Hgb continues to downtrend despite receiving 1u PRBC yesterday evening.   - transfuse 1u PRBC and 1u FFP  - obtain CT C/A/P with contrast  - cbc, coags bid and prn  - fibrinogen prn  - discontinue heparin infusion in the setting of c/f bleeding and rule out HIT  - if CT negative for bleed, then patient will need bival infusion  - ongoing monitoring for s/s bleeding  - close surveillance of RLE and drains  - F/U heme recs  -  Vasc Med consulted, appreciate recs -> obtain PF4    ENDO: History of DM2. A1c 7.5%. TSH WNL 1/2024  - continue insulin drip with q1h BG checks, transition off when more stable     ID:  Hypothermic requiring warming. Leukocytosis. On broad antimicrobial therapy. MRSA screen + 2/28/24. Pan cultures sent 3/22. Sputum NTF, +MRSA screen (3/24), Urine Cx negative. BC NGTD.  - temp q4h, wbc daily  - ongoing monitoring for s/s infection  - continue zosyn/vancomycin (stop date 3/27)  - mupirocin nasal ointment x5 days  - F/U cultures    Lines: 3/21  - right brachial arterial line (3/20)  - left femoral trialysis  - left subclavian MAC with mini MAC (3/20)  - PIV x2     Dispo: Continue ICU care. Patient seen and discussed with ICU attending Dr. Rouse and ICU fellow Dr. Higgins.    Critical care time: 57 minutes    PHILLIP Alejo-CNP  SICU phone [phone]

## 2024-03-26 NOTE — SIGNIFICANT EVENT
Okay to receive IV Contrast with CT    Hgb continues to downtrend despite receiving 1u PRBC overnight. Benefit of obtaining CT chest/abdomen/pelvis with IV contrast in the setting of suspected bleeding outweighs the risk of worsening renal function. Will pause CVVH for CT scan and resume upon return to CTICU.    Remy Peter, APRN-CNP  SICU phone [phone]

## 2024-03-26 NOTE — PROGRESS NOTES
"Jason Hollins is a 63 y.o. male on day 21 of admission presenting with Soft tissue sarcoma (CMS/HCC).    Subjective   Overnight received 1 PRBC, did not increment. Weaned off vasopressin.        Objective     Physical Exam    Last Recorded Vitals  Blood pressure 132/52, pulse 79, temperature 36.5 °C (97.7 °F), temperature source Esophageal, resp. rate 23, height 1.778 m (5' 10\"), weight 140 kg (308 lb 13.8 oz), SpO2 97 %.  Intake/Output last 3 Shifts:  I/O last 3 completed shifts:  In: 4665.1 (33.3 mL/kg) [I.V.:3569.1 (25.5 mL/kg); Blood:391; IV Piggyback:705]  Out: 4984 (35.6 mL/kg) [Emesis/NG output:50; Drains:855; Other:4079]  Weight: 140.1 kg     Relevant Results           This patient currently has cardiac telemetry ordered; if you would like to modify or discontinue the telemetry order, click here to go to the orders activity to modify/discontinue the order.  This patient has a central line   Reason for the central line remaining today? Dialysis/Hemapheresis, Hemodynamic monitoring, and Parenteral medication      This patient is intubated   Reason for patient to remain intubated today? they have continued cardiopulmonary lability/instability and they have inadequate gas-exchange without positive pressure        Malnutrition Diagnosis Status: New  Malnutrition Diagnosis: Moderate malnutrition related to acute disease or injury  As Evidenced by: <50% of estimated energy requirements for >5days (no intake x 6 days), +2 edema, current critically ill state.  I agree with the dietitian's malnutrition diagnosis.      Assessment/Plan   Principal Problem:    Soft tissue sarcoma (CMS/HCC)  Active Problems:    Extraskeletal myxoid chondrosarcoma (CMS/HCC)    Cardiopulmonary arrest (CMS/HCC)    63 y.o. male with PMH of DM2, HLD, myxoid chondrosarcoma s/p wide resection sarcoma, sciatic nerve neurolysis of right lower extremity with right gluteal reconstruction, pedicle ALT, vastus lateralus flap, pedicle gluteal and " perisformis flap with Dr. Hawkins and Dr. Smith on 3/5/24.  Post operative course was uncomplicated and patient was on RNF until 3/20 for prolonged bed rest to avoid hip flexion to maintain flap integrity.  Patient was on prophylactic lovenox while on bedrest. Per nursing report, patient was getting to the side of the bed today to get up and walk when he had sudden chest heaviness. Pre-cardiac arrest EKG with signs of anterior ischemia. Nursing was called by family because patient went unresponsive.  ACLS was immediately started. ROSC was achieved after 1 round of CPR, however patient quickly decompensated into PEA.  Airway was secured.  Multiple rounds of CPR were completed with intermittent ROSC prior to transport to CTICU.  Patient arrived to CTICU under SICU care with active CPR underway.  Primary concern for STEMI vs massive PE.  Bedside POCUS TTE with biventricular failure and dilated RV.  RV septal bowing. Decided to give TPA to treat massive PE.  After several minutes of CPR patient achieved  sustained ROSC. Subsequently taken to cath lab where angiography illustrated clean coronaries and no significant PE.      3/20: Now s/p CPR with ROSC after TPA, overnight started on heparin, epo, increased pressor requirements, increased swelling at flap site.   3/21: MTP transfusions. Return to OR for exploration of R Flap site. High pressors. On epo.    3/22 Initiated on CVVH for profound metabolic acidosis, repeat ECHO with hyperdynamic LV, normal RV function, NMB for hypoxemic respiratory failure, CT scans with pancreatitis.      I evaluated the patient with an advanced practice provider. I oversaw this patient's care, and I participated actively their clinical course.     Plan:  Neuro: Off sedation. No meaningful neurologic response to pain. Will obtain new head CT today.   Cardiovascular: Undifferentiated shock, remains on less hemodynamic support epi 0.08, NE 0.06; maintain invasive access; continue to support  MAP >65mmHg. Repeat ECHO with hyperdynamic LV and relatively normal RV function. No pericardial effusion, no noted PE on angiogram, no PTX, unlikely obstructive. Central venous saturation of 80%. Unlikely cardiogenic. Continue stress dose steroids for possible adrenal insufficiency; however will continue every 8 hours from every 6. Low concern for HLH from hematology consult.   Respiratory: Acute hypoxemic respiratory failure, likely multifactorial in the setting of hypervolemia. Off NMB this morning with FiO2 down to 50%, PEEP 14. Begin slow iepo wean today.   GI: NPO; PPI. Pressor requirements significantly reduced in the previous 3 days. Nutrition consult as anticipate initiating feeds vs TPN this afternoon. Persistent lactic acidosis. AST/ALT downtrending, slight uptrend in bilirubin. Continue to monitor. RUQ ultrasound relatively benign. CT head, chest, abdomen, and pelvis repeat today as he has an ongoing transfusion requirement.   : Anuric RUTH; started on RRT for persistent metabolic acidosis. Improving.   Endo: Hx DM2. Continue insulin infusion per protocol.   Heme: acute blood loss anemia from surgical limb; concern for recent PE and DVT, had paroxysmal atrial fibrillation, currently in NSR. Holding heparin this morning given transfusion requirement. CT scan as above.    ID: On vancomycin/zosyn for broad coverage. Complete a 7 day course of empiric antibiotics. Mupirocin x 5 days for MRSA swab positive. Repeat MRSA was positive.   Dispo: SICU Care; family members and primary surgeon updated. Supportive onc following.     Due to the high probability of life threatening clinical decompensation, the patient required critical care time evaluating and managing this patient.  Critical care time included obtaining a history, examining the patient, ordering and reviewing studies, discussing, developing, and implementing a management plan, evaluating the patient's response to treatment, and discussion with other  care team providers. I saw and evaluated the patient myself. I reviewed the provider's documentation and discussed the patient with the provider. Critical care time was performed exclusive of billable procedures.     Critical Care Time: 46 minutes       I spent 46 minutes in the professional and overall care of this patient.      Yamilex Rouse MD

## 2024-03-26 NOTE — PROGRESS NOTES
"      Department of Plastic and Reconstructive Surgery  Daily Progress Note    Patient Name: Jason Hollins  MRN: [id number]  Date:  03/26/24     Subjective  Critically ill in CTICU. Remains intubated. Pressors are being weaned and sedation has been turned off however nurse reports no meaningful movement. CRRT. Patient to CT this morning for repeat CT C/A/P due to decreasing Hgb despite 1 unit O/N.    Overnight Events  1 unit of blood O/N and sedation was weaned off.     Objective    Vital Signs  /62   Pulse 78   Temp 36.5 °C (97.7 °F) (Esophageal)   Resp 21   Ht 1.778 m (5' 10\")   Wt 140 kg (308 lb 13.8 oz)   SpO2 96%   BMI 44.32 kg/m²      Physical Exam   Constitutional: Intubated, no longer sedated, no meaningful movements as of yet  ENMT: moist mucous membranes, ETT in place, NGT in place  Cardiovascular: RRR on telemetry monitor  Respiratory/Thorax: ETT in place with ventilator support 50% FiO2  Gastrointestinal: abdomen soft, non distended  Genitourinary: CVVHD currently running   Musculoskeletal: Sedated/paralyzed  Extremities: Generalized edema, Right 4Fr CFA and 7Fr CFV sheath in place  RLE: right thigh edematous, large, no bruising noted, soft and compressible, incisional wound vac in place, holding suction at -75 mmHG, no leak or alarm present, ANDERSON drain x 3 with dark serous output  BLE neurovascular intact, cap refill < 2 sec, +df/pf, DP/PT/ radial pulses 2+, no drainage noted.   Neurological:  HANNAH. intubated   Psychological: HANNAH, intubated  Skin: Warm, dry, intact    Diagnostics   Results for orders placed or performed during the hospital encounter of 03/05/24 (from the past 24 hour(s))   POCT GLUCOSE   Result Value Ref Range    POCT Glucose 141 (H) 74 - 99 mg/dL   POCT GLUCOSE   Result Value Ref Range    POCT Glucose 142 (H) 74 - 99 mg/dL   Blood Gas Arterial Full Panel   Result Value Ref Range    POCT pH, Arterial 7.35 (L) 7.38 - 7.42 pH    POCT pCO2, Arterial 33 (L) 38 - 42 mm Hg    POCT pO2, " Arterial 95 85 - 95 mm Hg    POCT SO2, Arterial 99 94 - 100 %    POCT Oxy Hemoglobin, Arterial 96.4 94.0 - 98.0 %    POCT Hematocrit Calculated, Arterial 23.0 (L) 41.0 - 52.0 %    POCT Sodium, Arterial 130 (L) 136 - 145 mmol/L    POCT Potassium, Arterial 4.2 3.5 - 5.3 mmol/L    POCT Chloride, Arterial 101 98 - 107 mmol/L    POCT Ionized Calcium, Arterial 1.03 (L) 1.10 - 1.33 mmol/L    POCT Glucose, Arterial 132 (H) 74 - 99 mg/dL    POCT Lactate, Arterial 5.5 (HH) 0.4 - 2.0 mmol/L    POCT Base Excess, Arterial -6.7 (L) -2.0 - 3.0 mmol/L    POCT HCO3 Calculated, Arterial 18.2 (L) 22.0 - 26.0 mmol/L    POCT Hemoglobin, Arterial 7.6 (L) 13.5 - 17.5 g/dL    POCT Anion Gap, Arterial 15 10 - 25 mmo/L    Patient Temperature 37.0 degrees Celsius    FiO2 50 %   CBC   Result Value Ref Range    WBC 21.5 (H) 4.4 - 11.3 x10*3/uL    nRBC 4.1 (H) 0.0 - 0.0 /100 WBCs    RBC 2.55 (L) 4.50 - 5.90 x10*6/uL    Hemoglobin 7.6 (L) 13.5 - 17.5 g/dL    Hematocrit 20.8 (L) 41.0 - 52.0 %    MCV 82 80 - 100 fL    MCH 29.8 26.0 - 34.0 pg    MCHC 36.5 (H) 32.0 - 36.0 g/dL    RDW 15.9 (H) 11.5 - 14.5 %    Platelets 79 (L) 150 - 450 x10*3/uL   Magnesium   Result Value Ref Range    Magnesium 2.28 1.60 - 2.40 mg/dL   Renal Function Panel   Result Value Ref Range    Glucose 144 (H) 74 - 99 mg/dL    Sodium 132 (L) 136 - 145 mmol/L    Potassium 4.2 3.5 - 5.3 mmol/L    Chloride 96 (L) 98 - 107 mmol/L    Bicarbonate 20 (L) 21 - 32 mmol/L    Anion Gap 20 10 - 20 mmol/L    Urea Nitrogen 23 6 - 23 mg/dL    Creatinine 2.09 (H) 0.50 - 1.30 mg/dL    eGFR 35 (L) >60 mL/min/1.73m*2    Calcium 8.3 (L) 8.6 - 10.6 mg/dL    Phosphorus 3.1 2.5 - 4.9 mg/dL    Albumin 3.5 3.4 - 5.0 g/dL   Calcium, ionized   Result Value Ref Range    POCT Calcium, Ionized 1.07 (L) 1.1 - 1.33 mmol/L   Fibrinogen   Result Value Ref Range    Fibrinogen 306 200 - 400 mg/dL   Coagulation Screen   Result Value Ref Range    Protime 20.2 (H) 9.8 - 12.8 seconds    INR 1.8 (H) 0.9 - 1.1    aPTT  >200 (HH) 27 - 38 seconds   Prepare RBC: 1 Units   Result Value Ref Range    PRODUCT CODE F6668R03     Unit Number [id number]     Unit ABO O     Unit RH POS     XM INTEP COMP     Dispense Status TR     Blood Expiration Date April 06, 2024 23:59 EDT     PRODUCT BLOOD TYPE 5100     UNIT VOLUME 291    POCT GLUCOSE   Result Value Ref Range    POCT Glucose 140 (H) 74 - 99 mg/dL   Type and screen   Result Value Ref Range    ABO TYPE O     Rh TYPE POS     ANTIBODY SCREEN NEG    POCT GLUCOSE   Result Value Ref Range    POCT Glucose 154 (H) 74 - 99 mg/dL   POCT GLUCOSE   Result Value Ref Range    POCT Glucose 166 (H) 74 - 99 mg/dL   POCT GLUCOSE   Result Value Ref Range    POCT Glucose 150 (H) 74 - 99 mg/dL   CBC   Result Value Ref Range    WBC 21.7 (H) 4.4 - 11.3 x10*3/uL    nRBC 4.2 (H) 0.0 - 0.0 /100 WBCs    RBC 2.60 (L) 4.50 - 5.90 x10*6/uL    Hemoglobin 7.6 (L) 13.5 - 17.5 g/dL    Hematocrit 21.3 (L) 41.0 - 52.0 %    MCV 82 80 - 100 fL    MCH 29.2 26.0 - 34.0 pg    MCHC 35.7 32.0 - 36.0 g/dL    RDW 16.1 (H) 11.5 - 14.5 %    Platelets 66 (L) 150 - 450 x10*3/uL   Coagulation Screen   Result Value Ref Range    Protime 19.2 (H) 9.8 - 12.8 seconds    INR 1.7 (H) 0.9 - 1.1    aPTT 198 (HH) 27 - 38 seconds   Magnesium   Result Value Ref Range    Magnesium 2.29 1.60 - 2.40 mg/dL   Calcium, ionized   Result Value Ref Range    POCT Calcium, Ionized 1.05 (L) 1.1 - 1.33 mmol/L   Heparin Assay, UFH   Result Value Ref Range    Heparin Unfractionated 0.3 See Comment Below for Therapeutic Ranges IU/mL   Hepatic function panel   Result Value Ref Range    Albumin 3.6 3.4 - 5.0 g/dL    Bilirubin, Total 10.6 (H) 0.0 - 1.2 mg/dL    Bilirubin, Direct 6.7 (H) 0.0 - 0.3 mg/dL    Alkaline Phosphatase 111 33 - 136 U/L    ALT 1,152 (H) 10 - 52 U/L     (H) 9 - 39 U/L    Total Protein 4.8 (L) 6.4 - 8.2 g/dL   Basic Metabolic Panel   Result Value Ref Range    Glucose 154 (H) 74 - 99 mg/dL    Sodium 134 (L) 136 - 145 mmol/L    Potassium  4.4 3.5 - 5.3 mmol/L    Chloride 98 98 - 107 mmol/L    Bicarbonate 20 (L) 21 - 32 mmol/L    Anion Gap 20 10 - 20 mmol/L    Urea Nitrogen 24 (H) 6 - 23 mg/dL    Creatinine 2.10 (H) 0.50 - 1.30 mg/dL    eGFR 35 (L) >60 mL/min/1.73m*2    Calcium 8.2 (L) 8.6 - 10.6 mg/dL   Phosphorus   Result Value Ref Range    Phosphorus 2.8 2.5 - 4.9 mg/dL   Creatine Kinase   Result Value Ref Range    Creatine Kinase 514 (H) 0 - 325 U/L   Blood Gas Arterial Full Panel   Result Value Ref Range    POCT pH, Arterial 7.38 7.38 - 7.42 pH    POCT pCO2, Arterial 34 (L) 38 - 42 mm Hg    POCT pO2, Arterial 83 (L) 85 - 95 mm Hg    POCT SO2, Arterial 97 94 - 100 %    POCT Oxy Hemoglobin, Arterial 95.3 94.0 - 98.0 %    POCT Hematocrit Calculated, Arterial 24.0 (L) 41.0 - 52.0 %    POCT Sodium, Arterial 131 (L) 136 - 145 mmol/L    POCT Potassium, Arterial 4.3 3.5 - 5.3 mmol/L    POCT Chloride, Arterial 97 (L) 98 - 107 mmol/L    POCT Ionized Calcium, Arterial 1.05 (L) 1.10 - 1.33 mmol/L    POCT Glucose, Arterial 138 (H) 74 - 99 mg/dL    POCT Lactate, Arterial 4.7 (HH) 0.4 - 2.0 mmol/L    POCT Base Excess, Arterial -4.5 (L) -2.0 - 3.0 mmol/L    POCT HCO3 Calculated, Arterial 20.1 (L) 22.0 - 26.0 mmol/L    POCT Hemoglobin, Arterial 8.0 (L) 13.5 - 17.5 g/dL    POCT Anion Gap, Arterial 18 10 - 25 mmo/L    Patient Temperature 37.0 degrees Celsius    FiO2 50 %   POCT GLUCOSE   Result Value Ref Range    POCT Glucose 153 (H) 74 - 99 mg/dL   CBC   Result Value Ref Range    WBC 21.3 (H) 4.4 - 11.3 x10*3/uL    nRBC 4.8 (H) 0.0 - 0.0 /100 WBCs    RBC 2.55 (L) 4.50 - 5.90 x10*6/uL    Hemoglobin 7.4 (L) 13.5 - 17.5 g/dL    Hematocrit 20.9 (L) 41.0 - 52.0 %    MCV 82 80 - 100 fL    MCH 29.0 26.0 - 34.0 pg    MCHC 35.4 32.0 - 36.0 g/dL    RDW 16.3 (H) 11.5 - 14.5 %    Platelets 65 (L) 150 - 450 x10*3/uL   POCT GLUCOSE   Result Value Ref Range    POCT Glucose 153 (H) 74 - 99 mg/dL   POCT GLUCOSE   Result Value Ref Range    POCT Glucose 162 (H) 74 - 99 mg/dL   CBC    Result Value Ref Range    WBC 21.5 (H) 4.4 - 11.3 x10*3/uL    nRBC 4.8 (H) 0.0 - 0.0 /100 WBCs    RBC 2.35 (L) 4.50 - 5.90 x10*6/uL    Hemoglobin 7.0 (L) 13.5 - 17.5 g/dL    Hematocrit 18.9 (L) 41.0 - 52.0 %    MCV 80 80 - 100 fL    MCH 29.8 26.0 - 34.0 pg    MCHC 37.0 (H) 32.0 - 36.0 g/dL    RDW 16.3 (H) 11.5 - 14.5 %    Platelets 56 (L) 150 - 450 x10*3/uL   Blood Gas Arterial Full Panel   Result Value Ref Range    POCT pH, Arterial 7.41 7.38 - 7.42 pH    POCT pCO2, Arterial 31 (L) 38 - 42 mm Hg    POCT pO2, Arterial 104 (H) 85 - 95 mm Hg    POCT SO2, Arterial 98 94 - 100 %    POCT Oxy Hemoglobin, Arterial 96.5 94.0 - 98.0 %    POCT Hematocrit Calculated, Arterial 21.0 (L) 41.0 - 52.0 %    POCT Sodium, Arterial 131 (L) 136 - 145 mmol/L    POCT Potassium, Arterial 4.1 3.5 - 5.3 mmol/L    POCT Chloride, Arterial 101 98 - 107 mmol/L    POCT Ionized Calcium, Arterial 1.03 (L) 1.10 - 1.33 mmol/L    POCT Glucose, Arterial 148 (H) 74 - 99 mg/dL    POCT Lactate, Arterial 4.0 (HH) 0.4 - 2.0 mmol/L    POCT Base Excess, Arterial -4.5 (L) -2.0 - 3.0 mmol/L    POCT HCO3 Calculated, Arterial 19.6 (L) 22.0 - 26.0 mmol/L    POCT Hemoglobin, Arterial 7.1 (L) 13.5 - 17.5 g/dL    POCT Anion Gap, Arterial 15 10 - 25 mmo/L    Patient Temperature 37.0 degrees Celsius    FiO2 50 %   Prepare RBC: 1 Units   Result Value Ref Range    PRODUCT CODE A5265R06     Unit Number [id number]     Unit ABO O     Unit RH POS     XM INTEP COMP     Dispense Status XM     Blood Expiration Date April 22, 2024 23:59 EDT     PRODUCT BLOOD TYPE 5100     UNIT VOLUME 350      Upper extremity venous duplex bilateral    Result Date: 3/25/2024            [place] [address]   Tel [phone] and Fax [phone]  Vascular Lab Report [facility] US UPPER EXTREMITY VENOUS DUPLEX BILATERAL  Patient Name:     YVETTE Clinton           [id number] Facundo Bryant                                       Physician:         MD Study Date:       3/25/2024           Ordering          [id number] GREG JOHNSON                                       Physician: MRN/PID:          [id number]            Technologist:     Pedro Humphries RVT Accession#:       [id number]        Technologist 2:   Carlota Harper RVT Date of           1960 / 63      Encounter#:       [id number] Birth/Age:        years Gender:           M Admission Status: Inpatient           Location          [facility]                                       Performed:  Diagnosis/ICD: Left arm swelling-M79.89; Right arm swelling-M79.89 CPT Codes:     61186 Peripheral venous duplex scan for DVT complete  CONCLUSIONS: Right Upper Venous: No evidence of acute deep vein thrombus visualized in the right upper extremity. Edema noted in the arm. Left Upper Venous: No evidence of acute deep vein thrombus visualized in the left upper extremity. Unable to obtain compressions of the subclavian vein due to pt's positioning and body habitus; however there is good color filling and spontaneous and phasic flow noted. Edema noted in the arm.  Imaging & Doppler Findings:  Right               Compressible Thrombus        Flow Internal Jugular        Yes        None   Spontaneous/Phasic Subclavian Proximal                None   Spontaneous/Phasic Subclavian Mid          Yes        None   Spontaneous/Phasic Subclavian Distal       Yes        None   Spontaneous/Phasic Axillary                Yes        None   Spontaneous/Phasic Brachial                Yes        None Cephalic                Yes        None Basilic                 Yes        None  Left                Compress Thrombus        Flow Internal Jugular      Yes      None   Spontaneous/Phasic Subclavian Proximal            None   Spontaneous/Phasic Subclavian Mid                 None   Spontaneous/Phasic Subclavian Distal              None   Spontaneous/Phasic Axillary              Yes      None   Spontaneous/Phasic Brachial               Yes      None Cephalic              Yes      None Basilic               Yes      None  [id number] Facundo Bryant MD Electronically signed by [id number] Facundo Bryant MD on 3/25/2024 at 8:41:48 PM  ** Final **     Electrocardiogram, 12-lead PRN ACS symptoms    Result Date: 3/25/2024  Atrial fibrillation with rapid ventricular response Low voltage QRS Nonspecific ST abnormality Abnormal ECG When compared with ECG of 20-MAR-2024 18:41, Atrial fibrillation has replaced Sinus rhythm Right bundle branch block is no longer Present Criteria for Anterior infarct are no longer Present Criteria for Anterolateral infarct are no longer Present Criteria for Inferior infarct are no longer Present    Lower extremity venous duplex bilateral    Result Date: 3/25/2024            [place] [address]   Tel [phone] and Fax [phone]  Vascular Lab Report [facility] LOWER EXTREMITY VENOUS DUPLEX BILATERAL  Patient Name:      YVETTE Clinton Physician: [id number] Bernadette Segovia MD Study Date:        3/25/2024           Ordering           [id number] GREG BAUTISTA                                        Physician:         ELIZABETH MRN/PID:           [id number]            Technologist:      Pedro Humphries RVT Accession#:        [id number]        Technologist 2: Date of Birth/Age: 1960 / 63      Encounter#:        [id number]                    years Gender:            M Admission Status:  Inpatient           Location           [facility]                                        Performed:  Diagnosis/ICD: Other pulmonary embolism without acute cor pulmonale-I26.99 CPT Codes:     23137 Peripheral venous duplex scan for DVT complete  **CRITICAL RESULT** Critical Result: Acute DVT in right soleal vein. Notification called to Fox GARCÍA on 3/25/2024 at 11:48:07 AM by Pedro Humphries RVT.  CONCLUSIONS: Right Lower Venous: There is  acute occlusive deep vein thrombosis visualized in the soleal vein. Cannot rule out thrombus in non-visualized iliac, common femoral and profunda femoral veins. Unable to visualize the external iliac and common femoral veins due to bandage in pt's groin. Left Lower Venous: No evidence of acute deep vein thrombus visualized in the left lower extremity. Cannot rule out thrombus in non-visualized iliac, common femoral and profunda femoral veins. Unable to visualize the external iliac and common femoral veins due to a line and bandage in pt's groin.  Imaging & Doppler Findings:  Right       Compressible    Thrombus            Flow PFV             Yes           None FV Proximal     Yes           None       Spontaneous/Phasic FV Mid          Yes           None FV Distal       Yes           None Popliteal       Yes           None       Spontaneous/Phasic Peroneal        Yes           None PTV             Yes           None Soleal           No      Acute occlusive  Left        Compress Thrombus        Flow PFV           Yes      None FV Proximal   Yes      None   Spontaneous/Phasic FV Mid        Yes      None FV Distal     Yes      None Popliteal     Yes      None   Spontaneous/Phasic Peroneal      Yes      None PTV           Yes      None Soleal        Yes      None  [id number] Bernadette Segovia MD Electronically signed by [id number] Bernadette Segovia MD on 3/25/2024 at 1:56:29 PM  ** Final **     XR chest 1 view    Result Date: 3/25/2024  Interpreted By:  Nadege Parrish, STUDY: XR CHEST 1 VIEW;  3/25/2024 3:20 am   INDICATION: Signs/Symptoms:daily icu.   COMPARISON: 03/24/2024   ACCESSION NUMBER(S): [id number]   ORDERING CLINICIAN: GILSON DUTTON   FINDINGS: There is an NG tube with the tip past the level of the GE junction, out of the field of view. There is an ET tube tip 4 cm above the ariadna. Left subclavian catheter with the projected over the upper left mediastinum. This is an expected location of the left brachiocephalic vein.        CARDIOMEDIASTINAL SILHOUETTE: Cardiomediastinal silhouette is stable in size and configuration.   LUNGS: Minimal improved aeration in the lung fields when compared with the prior exam. Haziness in the bilateral lower lobe distribution, right-greater-than-left. The costophrenic angles are blunted. No evidence of a pneumothorax.   ABDOMEN: No remarkable upper abdominal findings.   BONES: No acute osseous changes.       1.  Minimal interval improvement bilateral lung aeration. Costophrenic angles remain blunted, right-greater-than-left with bilateral lower lobe airspace opacities, right-greater-than-left consistent with layering effusions,atelectasis/consolidation       MACRO: None   Signed by: Nadege Parrish 3/25/2024 1:17 PM Dictation workstation:   [id number]    XR chest 1 view    Result Date: 3/25/2024  Interpreted By:  Nadege Parrish and Stephens Katherine STUDY: XR CHEST 1 VIEW; XR ABDOMEN 1 VIEW;  3/24/2024 9:13 pm; 3/24/2024 9:29 pm   INDICATION: Signs/Symptoms:Enteric tube in esophagus?; Signs/Symptoms:KUb placement.   COMPARISON: Same day chest radiograph from 3:42 a.m. CT chest abdomen pelvis 03/22/2024   ACCESSION NUMBER(S): [id number]; [id number]   ORDERING CLINICIAN: TEZ DUMONT   FINDINGS: AP radiograph of the chest was provided.   Endotracheal tube noted with tip projecting approximately 3.5 cm above the ariadna. Enteric tube seen coursing below the level diaphragm with tip overlying the expected location of the stomach. Esophageal probe terminating in the thoracic inlet.   CARDIOMEDIASTINAL SILHOUETTE: Cardiomediastinal silhouette is stable in size and configuration.   LUNGS: Hazy bibasilar opacities with blunting of the costophrenic angles. Prominent perihilar and interstitial markings bilaterally. No focal consolidation or pneumothorax.   ABDOMEN: Paucity of bowel gas within the upper abdomen. Limited evaluation of pneumoperitoneum on supine imaging, however there is no evidence of gross  free air.   BONES: No acute osseous changes.       1.  Similar layering bilateral pleural effusions with adjacent atelectasis. Superimposed infectious/inflammatory process can not be fully excluded. 2. Similar pulmonary interstitial edema. 3. Paucity of bowel gas within the upper abdomen. 4.  Medical devices as described above.   I personally reviewed the images/study and I agree with Isabella Walker DO's (radiology resident) findings as stated. This study was interpreted at [place], [place].   MACRO: None   Signed by: Nadege Parrish 3/25/2024 8:19 AM Dictation workstation:   [id number]    XR abdomen 1 view    Result Date: 3/25/2024  Interpreted By:  Nadege Parrish and Stephens Katherine STUDY: XR CHEST 1 VIEW; XR ABDOMEN 1 VIEW;  3/24/2024 9:13 pm; 3/24/2024 9:29 pm   INDICATION: Signs/Symptoms:Enteric tube in esophagus?; Signs/Symptoms:KUb placement.   COMPARISON: Same day chest radiograph from 3:42 a.m. CT chest abdomen pelvis 03/22/2024   ACCESSION NUMBER(S): [id number]; [id number]   ORDERING CLINICIAN: TEZ DUMONT   FINDINGS: AP radiograph of the chest was provided.   Endotracheal tube noted with tip projecting approximately 3.5 cm above the ariadna. Enteric tube seen coursing below the level diaphragm with tip overlying the expected location of the stomach. Esophageal probe terminating in the thoracic inlet.   CARDIOMEDIASTINAL SILHOUETTE: Cardiomediastinal silhouette is stable in size and configuration.   LUNGS: Hazy bibasilar opacities with blunting of the costophrenic angles. Prominent perihilar and interstitial markings bilaterally. No focal consolidation or pneumothorax.   ABDOMEN: Paucity of bowel gas within the upper abdomen. Limited evaluation of pneumoperitoneum on supine imaging, however there is no evidence of gross free air.   BONES: No acute osseous changes.       1.  Similar layering bilateral pleural effusions with adjacent atelectasis.  Superimposed infectious/inflammatory process can not be fully excluded. 2. Similar pulmonary interstitial edema. 3. Paucity of bowel gas within the upper abdomen. 4.  Medical devices as described above.   I personally reviewed the images/study and I agree with Isabella Walker DO's (radiology resident) findings as stated. This study was interpreted at [facility], [place], [place].   MACRO: None   Signed by: Nadege Parrish 3/25/2024 8:19 AM Dictation workstation:   [id number]    XR chest 1 view    Result Date: 3/24/2024  Interpreted By:  Leonel Hanley, STUDY: XR CHEST 1 VIEW;  3/24/2024 3:42 am   INDICATION: Signs/Symptoms:daily icu.   COMPARISON: Chest radiograph 03/23/2024 and CT scan 03/22/2024   ACCESSION NUMBER(S): [id number]   ORDERING CLINICIAN: GILSON DUTTON   FINDINGS: AP radiograph of the chest was provided. There is better patient centering on present examination. Endotracheal tube tip now projects 5.2 cm superior to ariadna. A left subclavian approach central venous catheter with tip stably projecting over upper mediastinum, within expected location of left brachiocephalic vein.   CARDIOMEDIASTINAL SILHOUETTE: Cardiomediastinal silhouette is stable in size and configuration, with similar significant obscuration of bilateral heart borders.   LUNGS: Redemonstration of layering bilateral pleural effusion and associated atelectasis/consolidation, right more than left. Background mild pulmonary edema not excluded. Overall lung aeration is grossly unchanged as compared to prior. No pneumothorax.   ABDOMEN: No remarkable upper abdominal findings.   BONES: No acute osseous changes.       1. No significant change in layering bilateral pleural effusions and associated atelectasis/consolidation, right more than left. Background mild pulmonary edema is not excluded. 2. Medical devices as above. No pneumothorax.   Signed by: Leonel Hanley 3/24/2024 8:23 AM Dictation workstation:    [id number]    XR abdomen 1 view    Result Date: 3/24/2024  Interpreted By:  Leonel Hanley and Dervishi Mario STUDY: XR ABDOMEN 1 VIEW;  3/24/2024 1:19 am   INDICATION: Signs/Symptoms:OG placement.   COMPARISON: Chest radiograph: 03/23/2024, CT chest abdomen pelvis: 03/22/2024   ACCESSION NUMBER(S): [id number]   ORDERING CLINICIAN: TEZ DUMONT   FINDINGS: An enteric tube is not visualized in the included field-of-view.   Nonobstructive bowel gas pattern. Limited evaluation of pneumoperitoneum on supine imaging, however no gross evidence of free air is noted.   Partially visualized bilateral layering pleural effusions and associated atelectasis/consolidation, right more than left. Partially visualized endotracheal tube with the tip projecting about 3 cm from the ariadna.   Osseous structures demonstrate no acute bony changes.       1.  No enteric tube is visualized in the included field-of-view. Correlate clinically   I personally reviewed the images/study and I agree with the findings as stated by Resident Davide Olson MD. This study was interpreted at [place], [place].   MACRO: None   Signed by: Leonel Hanley 3/24/2024 8:22 AM Dictation workstation:   [id number]    XR chest 1 view    Result Date: 3/24/2024  Interpreted By:  Leonel Hanley and Meyers Emily STUDY: XR CHEST 1 VIEW;  3/23/2024 6:25 pm   INDICATION: Signs/Symptoms:intubation.   COMPARISON: Chest radiograph 03/23/2024 and chest, abdomen and pelvis CT scan from 03/22/2020   ACCESSION NUMBER(S): [id number]   ORDERING CLINICIAN: WILLIAM LOZADA   FINDINGS: AP radiograph of the chest was provided. Patient is again rotated towards left. Please note that, left costophrenic angle is not included in field of view. Endotracheal tube tip now projects 4.4 cm superior to ariadna. Presumed left subclavian approach central venous catheter with tip projecting over upper mediastinum, within expected location of left  brachiocephalic vein.   CARDIOMEDIASTINAL SILHOUETTE: Cardiomediastinal silhouette is stable in size and configuration.   LUNGS: There is again demonstration of layering bilateral pleural effusion and associated atelectasis/consolidation, right more than left. Background mild pulmonary edema not excluded. Overall lung aeration is slightly improved as compared to prior. No pneumothorax.   ABDOMEN: No remarkable upper abdominal findings.   BONES: No acute osseous changes.       1. Redemonstration of layering bilateral pleural effusion and associated atelectasis/consolidation, right more than left.  Overall lung aeration is slightly improved as compared to prior. 2. Background mild pulmonary edema not excluded. 3. Medical devices as above.   I personally reviewed the images/study, and I agree with the findings as stated above. This study was interpreted at [place], [place].   MACRO: None   Signed by: Leonel Hanley 3/24/2024 8:21 AM Dictation workstation:   [id number]    XR chest 1 view    Result Date: 3/23/2024  Interpreted By:  Leonel Hanley and Meyers Emily STUDY: XR CHEST 1 VIEW;  3/23/2024 9:41 am   INDICATION: Signs/Symptoms:SICU daily.   COMPARISON: Chest radiograph 03/22/2024 and CT scan 03/22/2024   ACCESSION NUMBER(S): [id number]   ORDERING CLINICIAN: GILSON DUTTON   FINDINGS: AP radiograph of the chest was provided. Patient is now significantly rotated towards left, limiting evaluation and comparison. Endotracheal tube tip now projects approximately 5 cm superior to the level of ariadna. Enteric tube is again seen coursing below diaphragm and tip not included in field of view.   CARDIOMEDIASTINAL SILHOUETTE: Cardiomediastinal silhouette is grossly stable in size and configuration.   LUNGS: Persistent prominent perihilar interstitial lung markings. In addition, there is a confluent retrocardiac opacity, which may be partially positional. Blunting of the  right-greater-than-left costophrenic angles with associated right-greater-than-left hazy airspace opacity. No pneumothorax.   ABDOMEN: No remarkable upper abdominal findings.   BONES: No acute osseous changes.       1. Persistent pulmonary edema. 2. Interval worsening of bibasilar lung aeration and blunting of the right-greater-than-left costophrenic angles, which may be partially positional, though likely represent worsening atelectasis/consolidation/pleural effusions.. 3. Medical lines and devices as detailed above.   I personally reviewed the images/study, and I agree with the findings as stated above. This study was interpreted at [place], [place].   MACRO: None   Signed by: Leonel Hanley 3/23/2024 1:30 PM Dictation workstation:   [id number]    CT chest abdomen pelvis w IV contrast    Result Date: 3/22/2024  Interpreted By:  Lobo Loving and Hofer Lindsay STUDY: CT CHEST ABDOMEN PELVIS W IV CONTRAST;  3/22/2024 4:38 pm   INDICATION: Signs/Symptoms:shock, unclear diagnosis.   COMPARISON: CT chest 01/31/2024   ACCESSION NUMBER(S): [id number]   ORDERING CLINICIAN: GREG JOHNSON   TECHNIQUE: CT of the chest, abdomen, and pelvis was performed.  Contiguous axial images were obtained at 3 mm slice thickness through the chest, abdomen and pelvis. Coronal and sagittal reconstructions at 3 mm slice thickness were performed. 75 ml of contrast Omnipaque 350 were administered intravenously without immediate complication.   FINDINGS: Note: The study is limited by the bilateral arms on either side of the patient.   CHEST:   LUNG/PLEURA/LARGE AIRWAYS: Intubation with the tip of the endotracheal tube approximately 3 cm above the ariadna. Large bilateral pleural effusions with adjacent atelectasis. Mosaic/ground-glass appearance in the bilateral upper lung lobes, which may be due to artifact versus infectious/inflammatory etiology.     VESSELS: Aorta and pulmonary arteries are  normal caliber.  Mild atherosclerotic changes are noted of the aorta and branching vessels. No coronary artery calcifications are present.   HEART: The heart is normal in size.  A small pericardial effusion is present.   MEDIASTINUM AND ALDEN: No mediastinal, hilar or axillary lymphadenopathy is present. Enteric tube coursing through esophagus. The esophagus is otherwise unremarkable.   CHEST WALL AND LOWER NECK: Nodularity along the anterior chest wall, right-greater-than-left, which could represent dilated lymphatic system versus posttraumatic findings versus true soft tissue nodules. The visualized thyroid gland appears within normal limits.   ABDOMEN:   LIVER: Within the limitations of the study, the liver is normal in size with no evidence of focal lesions.   BILE DUCTS: The intrahepatic and extrahepatic ducts are not dilated.   GALLBLADDER: The gallbladder is nondistended and without evidence of radiopaque stones.   PANCREAS: Fat stranding and edema around the head of the pancreas at the pancreatic-duodenal groove.   SPLEEN: The spleen is normal in size without focal lesions.   ADRENAL GLANDS: Bilateral adrenal glands appear normal.   KIDNEYS AND URETERS: The kidneys are normal in size and enhance symmetrically.  No hydroureteronephrosis or nephroureterolithiasis is identified.   PELVIS:   BLADDER: The bladder is decompressed with a Marino catheter in place. Limited for evaluation.   REPRODUCTIVE ORGANS: The prostate is unremarkable.   BOWEL: Enteric tube with the tip terminating within the gastric antrum. Fat stranding surrounding the 2nd and 3rd portions of the duodenum. The remainder of the visualized small bowel is normal in size without abnormal wall thickening. The large bowel is normal in caliber and demonstrates no wall thickening The appendix is not definitely visualized. There is however no pericecal stranding or fluid.     VESSELS: The aorta and IVC appear normal. Central venous catheter is within the  bilateral common femoral veins and external iliac veins. Arterial line within the right common femoral artery.   PERITONEUM/RETROPERITONEUM/LYMPH NODES: Diffuse free fluid throughout the abdomen and pelvis. No loculated fluid collections. No abdominopelvic lymphadenopathy is present.   BONE AND SOFT TISSUE: Several sclerotic lesions throughout the sacrum, most consistent with bone islands. No suspicious osseous lesions are identified.  Soft tissue density within the left groin likely representing enlarged lymph node versus small hematoma likely due to attempted line placement. Several small hematomas with foci of air throughout the right groin likely due to attempted line placement. Diffuse body wall edema.       1. Large bilateral pleural effusions, small pericardial effusion, diffuse ascites, and diffuse body wall edema concerning for fluid overload. Recommend echocardiogram and correlation with patient's fluid status. 2. Localized fat stranding around the 2nd and 3rd portions of the duodenum and the head of the pancreas at the pancreaticoduodenal groove, which can be seen in pancreatitis. Recommend correlation with serum lipase levels to rule out pancreatitis. 3. Soft tissue density within the left groin which could represent enlarged lymph node versus small hematoma secondary to attempted line placement. There are also several small hematomas with foci of air throughout the right groin likely secondary to attempted line placement. 4. Nodularity along the anterior chest wall, right-greater-than-left, which could represent dilated lymphatic system versus posttraumatic etiology versus soft tissue nodules.   I personally reviewed the images/study and resident's interpretation and I agree with the findings as stated by Jinny Arellano MD (resident radiologist). This study was analyzed and interpreted at [facility], [place], [place].   MACRO: None   Signed by: Lobo Loving 3/22/2024  8:41 PM Dictation workstation:   [id number]    Invasive vascular procedure    Result Date: 3/22/2024   [facility] ([facility]3], Cath Lab, [address] [place] [address] Cardiovascular Catheterization Report Patient Name:      YVETTE LAWSON            Performing Physician:  Kassie Wolfe MD Study Date:        3/20/2024            Verifying Physician:   Kassie Wolfe MD MRN/PID:           [id number]             Cardiologist/Co-scrub: Accession#:        [id number]         Ordering Physician:    [id number] JARRED ADAMS Date of Birth/Age: 1960 / 63 years Fellow:                [id number] Alessandro Benton MD Gender:            M                    Fellow:                [id number] Hattie Rae MD Encounter#:        [id number]  Study:            Left Heart Cath Additional Study: Pulmonary Angiogram Additional Study: Venogram Additional Study: Peripheral Angiogram  Indications: YVETTE LAWSON is a 64 year old male who presents with dyslipidemia, hypertension and diabetes, 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 Severe RV dilation (change from CT scan February). c/f acute massive PE. Cardiomyopathy. Heart failure.  Procedure Description: After infiltration with 2% Lidocaine, the right femoral artery was cannulated with a modified Seldinger technique. Subsequently a 4 Citizen of [place] sheath was placed retrograde in the right femoral artery. After infiltration of local anesthetic, the right femoral vein was identified with two-dimensional ultrasound. Under direct ultrasound visualization, the right femoral vein was cannulated with a micropuncture technique. A 7 Citizen of [place] sheath was placed in the vein. Selective coronary catheterization was performed using a 4 Fr catheter(s) exchanged over a guide wire to cannulate the  coronary arteries. A JL 4 tip catheter was used for left coronary injections. A JR 4 tip catheter was used for right coronary injections. Multiple injections of contrast were made into the left and right coronary arteries with angiograms recorded in multiple projections. Cardiac output was calculated via the Marco method. After completion of the procedure, the arterial sheath was left intact and sutured in place. Post-procedure, the venous sheath was left intact and sutured in place. Pulmonary angiogram was performed using a 5Fr pigtail, revealed no filling defect and adequate bilateral pulmonary artery opacification.  Procedure Description Comments: Initial arterial access was LFA with possibility of VA ECMO. Angiogram shows very high bifurcation of SFA/PFA (PFA with branch). Aborted LFA - manual compression held.  Coronary Angiography: The coronary circulation is right dominant.  Left Main Coronary Artery: The left main coronary artery is a normal caliber vessel. The left main arises normally from the left coronary sinus of Valsalva and bifurcates into the LAD and circumflex coronary arteries. The left main coronary artery showed no significant disease or stenosis greater than 30%.  Left Anterior Descending Coronary Artery Distribution: The left anterior descending coronary artery is a normal caliber vessel. The LAD arises normally from the left main coronary artery. The LAD demonstrated no significant disease or stenosis greater than 30%.  Circumflex Coronary Artery Distribution: The circumflex coronary artery is a normal caliber vessel. The circumflex arises normally from the left main coronary artery and terminates in the AV groove. The circumflex revealed no significant disease or stenosis greater than 30%.  Right Heart Catheterization: Cardiac output was calculated via the Marco method. Cardiac output is normal. Preserved cardiac output at rest (on multiple pressors).  Right Coronary Artery Distribution: The  right coronary artery is a normal caliber vessel. The RCA arises normally from the right sinus of Valsalva. The RCA showed no significant disease or stenosis greater than 30%.  Right Lower Extremity Arterial Findings: Right Common Femoral Artery: The right common femoral artery revealed no evidence of significant disease. Right Profunda Femoris Artery: The right profunda femoris artery revealed no evidence of significant disease. Proximal vessel visualized. Right Superior Femoral Artery: The right superficial femoral artery revealed no evidence of significant disease. Proximal vessel visualized.  Left Lower Extremity Arterial Findings: Left Common Femoral Artery: The left common femoral artery revealed no evidence of significant disease. Very high bifurcation. Left Profunda Femoris Artery: The left profunda femoris artery revealed no evidence of significant disease. Proximal vessel visualized (large branch is seen). Left Superior Femoral Artery: The left superficial femoral artery revealed no evidence of significant disease. Proximal vessel visualized.  Peripheral Veins: IVC was injected with contrast and revealed no thrombus.  Hemo Personnel: +----------+---------+ Name      Duty      +----------+---------+ Axel Wolfe MDPROC MD 1 +----------+---------+  Hemodynamic Pressures:  +----+----------+---------+-------------+-------------+---+----+-------+-------+ SiteDate Time   Phase  Systolic mmHg  Diastolic  ED MeanA-Wave V-Wave                  Name                    mmHg     mmHmmHg mmHg   mmHg                                                     g                    +----+----------+---------+-------------+-------------+---+----+-------+-------+  Art 3/21/2024     Rest          124           52     73                     12:16:05                                                                     AM                                                          +----+----------+---------+-------------+-------------+---+----+-------+-------+  Art 3/21/2024     Rest          116           57     71                     12:25:44                                                                     AM                                                         +----+----------+---------+-------------+-------------+---+----+-------+-------+   RA 3/21/2024     Rest                               12     15     14       12:37:39                                                                     AM                                                         +----+----------+---------+-------------+-------------+---+----+-------+-------+  Art 3/21/2024     Rest          114           57     70                     12:37:39                                                                     AM                                                         +----+----------+---------+-------------+-------------+---+----+-------+-------+   PA 3/21/2024     Rest           36            1     24                     12:38:54                                                                     AM                                                         +----+----------+---------+-------------+-------------+---+----+-------+-------+  Art 3/21/2024     Rest          109           54     67                     12:38:54                                                                     AM                                                         +----+----------+---------+-------------+-------------+---+----+-------+-------+   PA 3/21/2024     Rest           31 17 27                     12:41:45                                                                     AM                                                          +----+----------+---------+-------------+-------------+---+----+-------+-------+  Art 3/21/2024     Rest          114           56     69                     12:41:45                                                                     AM                                                         +----+----------+---------+-------------+-------------+---+----+-------+-------+   LV 3/21/2024     Rest           91           -2  9                         12:52:28                                                                     AM                                                         +----+----------+---------+-------------+-------------+---+----+-------+-------+  Art 3/21/2024     Rest          121           53     70                     12:52:28                                                                     AM                                                         +----+----------+---------+-------------+-------------+---+----+-------+-------+   LV 3/21/2024     Rest           91           -3  8                         12:52:36                                                                     AM                                                         +----+----------+---------+-------------+-------------+---+----+-------+-------+  Art 3/21/2024     Rest          119           54     70                     12:52:36                                                                     AM                                                         +----+----------+---------+-------------+-------------+---+----+-------+-------+  LVp 3/21/2024     Rest           91            0 10                         12:52:45                                                                     AM                                                          +----+----------+---------+-------------+-------------+---+----+-------+-------+  Art 3/21/2024     Rest          120           55     71                     12:52:45                                                                     AM                                                         +----+----------+---------+-------------+-------------+---+----+-------+-------+  AOp 3/21/2024     Rest           92           51     65                     12:52:52                                                                     AM                                                         +----+----------+---------+-------------+-------------+---+----+-------+-------+  Art 3/21/2024     Rest          120           55     71                     12:52:52                                                                     AM                                                         +----+----------+---------+-------------+-------------+---+----+-------+-------+   AO 3/21/2024     Rest           90           49     66                     12:52:56                                                                     AM                                                         +----+----------+---------+-------------+-------------+---+----+-------+-------+  Art 3/21/2024     Rest          118           55     70                     12:52:56                                                                     AM                                                         +----+----------+---------+-------------+-------------+---+----+-------+-------+  Oxygen Saturation %: +-----------+----------+------------+ Sample SiteO2 Sat (%)HB (g/100ml) +-----------+----------+------------+          FA        99         8.6 +-----------+----------+------------+          PA        58          8.6 +-----------+----------+------------+          FA        99         8.6 +-----------+----------+------------+          PA        58         8.6 +-----------+----------+------------+  Cardiac Outputs: +---------------+------------------+-------+ SANA CO (l/min)SANA CI (l/min/m2)SANA SV +---------------+------------------+-------+             5.7               2.6   40.1 +---------------+------------------+-------+  Vascular Resistance Calculated Values (Wood Units): +-----+---+----+---+----+----+----+-------+ PhaseSVRSVRITPRTPRITVR TVRITPR/TVR +-----+---+----+---+----+----+----+-------+ 0    [id number] 11.625.30       +-----+---+----+---+----+----+----+-------+  Complications: No in-lab complications observed.  Cardiac Cath Post Procedure Notes: Post Procedure Diagnosis: S/p pulmonary angiogram, coronary angiogram and IVC                           venogram. Blood Loss:               Estimated blood loss during the procedure was 5 mls. Specimens Removed:        Number of specimen(s) removed: none. ____________________________________________________________________________________ CONCLUSIONS:  1. IVC venogram revealed no thrombus.  2. Bilateral pulmonary aniogram revealed no filling defect and adequate pulmonary flow.  3. Coronary angiogram in right dominant system with no obstructive disease.  4. Bilateral CFA angiogram - reasonable size but LFA high bifurcation.  5. 4/7F arterial/venous R groin sheaths left in event conversion needed for VA ECMO. ICD 10 Codes: Cardiac arrest due to other underlying condition-I46.8; Cardiogenic shock-R57.0  CPT Codes: Right & Left Heart Cath w/ventriculography (RHC)([facility])-07007; Angiography, Extremity,Bilat ,S&I (PER)-66764; Angiography, pulmonary, bilateral S&I-41871; Injection extremity venography (PER)-68459; Venogram, IVC-96648; Moderate Sedation Services initial 15 minutes patient >5 years-97012; Moderate Sedation Services 1st additional  15 minutes patient >5 years-03735  [id number] Axel Wolfe MD Performing Physician Electronically signed by [id number] Wolfe MD on 3/22/2024 at 6:47:02 PM  ** Final **     CT head wo IV contrast    Result Date: 3/22/2024  Interpreted By:  Suki Cai, STUDY: CT HEAD WO IV CONTRAST;  3/22/2024 4:38 pm   INDICATION: Signs/Symptoms:shock, unclear diagnosis.   COMPARISON: None.   ACCESSION NUMBER(S): [id number]   ORDERING CLINICIAN: GREG JOHNSON   TECHNIQUE: Noncontrast axial CT scan of head was performed. Angled reformats in brain and bone windows were generated. The images were reviewed in bone, brain, blood and soft tissue windows. Coronal and sagittal reformats are provided for review.   FINDINGS: CSF Spaces: The ventricles, sulci and basal cisterns are within normal limits.  There is no extraaxial fluid collection.   Parenchyma:  There are subtle areas of diminished attenuation in the subcortical and periventricular white matter. The images are somewhat degraded by suboptimal signal to noise. This limits assessment for subtle loss of gray/white matter differentiation. Within these limitations, the grey-white differentiation is intact. There is no mass effect or midline shift.  There is no intracranial hemorrhage.   Calvarium: There is nonspecific extracranial soft tissue swelling and edema.   Paranasal sinuses and mastoids: There is fluid within the maxillary and sphenoid sinuses, some of which is hyperdense which may be seen with blood products. There is partial to complete opacification of scattered ethmoid air cells. There are secretions in the nasopharynx, and nasal passages, some of which is hyperdense and may reflect the presence of blood products as well. Endotracheal and enteric tubes are partially visualized.   There are degenerative changes of the temporomandibular articulations.       1. Subtle white-matter changes are nonspecific but most likely represent small-vessel ischemic disease in a patient of this  age. Within the limitations of this examination there is no definitive evidence of acute cortical infarct, acute intracranial hemorrhage, or mass effect. MRI with diffusion-imaging would be a much more sensitive means of assessing for acute ischemic injury, however. 2. There is hyperdense fluid within several paranasal sinuses, the nasal passages, and the nasopharynx which could represent blood products.       MACRO: None   Signed by: Suki Cai 3/22/2024 5:29 PM Dictation workstation:   [id number]    US abdomen complete    Result Date: 3/22/2024  Interpreted By:  Rex Morrow  and Wesley Hernandez STUDY: US ABDOMEN COMPLETE;  3/22/2024 3:29 pm   INDICATION: Signs/Symptoms:transamiinitis. 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 Patient now in critical condition, undifferentiated shock.   COMPARISON: None.   ACCESSION NUMBER(S): [id number]   ORDERING CLINICIAN: GREG JOHNSON   TECHNIQUE: Multiple images of the abdomen were obtained.   FINDINGS: Examination limited due to ICU portable setting, patient intubation. Within this limitation:   LIVER: The liver measures 17.4 cm and is grossly unremarkable and free of any focal lesions.   GALLBLADDER: Gallbladder is nondistended without evidence of gallstone. Gallbladder wall is thickened, measuring 0.6 cm. No pericholecystic fluid. Sonographic Sanchez sign unable to be assessed due to patient intubation.   BILE DUCTS: No evidence of intra or extrahepatic biliary dilatation is identified; the common bile duct measures 0.3 cm.   PANCREAS: The pancreas is poorly visualized due to overlying bowel gas.   RIGHT KIDNEY: The right kidney measures 13.5 cm in length. The renal cortical echogenicity and thickness are within  normal limit.  No hydronephrosis or renal calculi are seen.   LEFT KIDNEY: Left kidney is poorly visualized due to above limitations.   SPLEEN: Spleen is poorly visualized due to above limitations.   URINARY BLADDER: Urinary bladder is decompressed and poorly visualized.   PERITONEUM: Possible trace volume free fluid in the abdomen poorly visualized due to limitations of this examination.   ABDOMINAL AORTA AND IVC: The visualized portions of the aorta and IVC are unremarkable.       1. Thickening of the gallbladder wall without cholelithiasis or pericholecystic fluid likely reflecting patient's fluid status. 2. Small volume right pleural effusion.   I personally reviewed the images/study and I agree with the findings as stated by David Olson MD. This study was interpreted at [place], [place].   MACRO: None   Signed by: Rex Morrow 3/22/2024 4:49 PM Dictation workstation:   [id number]    Transthoracic Echo (TTE) Limited    Result Date: 3/22/2024   [facility], [address] [place] [address]                Tel [phone] and Fax [phone] TRANSTHORACIC ECHOCARDIOGRAM REPORT  Patient Name:      YVETTE LAWSON            Mary Beth Physician:    [id number] Mynor Richey MD Study Date:        3/22/2024            Ordering Provider:    [id number] GREG JOHNSON MRN/PID:           [id number]             Fellow: Accession#:        [id number]         Nurse: Date of Birth/Age: 1960 / 63 years Sonographer:          CHIDI Gomez RDCS Gender:            M                    Additional Staff: Height:                                 Admit Date:           3/5/2024 Weight:                                 Admission Status:     Inpatient - STAT BSA / BMI:          m2 / kg/m2           Encounter#:           [id number]                                         Department Location:  [facility] Blood Pressure: 131 /41 mmHg Study Type:    TRANSTHORACIC ECHO (TTE) LIMITED Diagnosis/ICD: Shock, unspecified-R57.9 Indication:    ECMO Eval CPT Code:      Echo Limited-51650; Doppler Limited-93027; Color Doppler-57348 Patient History: Diabetes:          Yes Pertinent History: Hyperlipidemia. cardiac arrest, soft tissue sarcoma,. Study Detail: The following Echo studies were performed: 2D, M-Mode, Doppler and               color flow. Definity used as a contrast agent for endocardial               border definition. Total contrast used for this procedure was 4 mL               via IV push.  PHYSICIAN INTERPRETATION: Left Ventricle: The left ventricular systolic function is hyperdynamic, with an estimated ejection fraction of 70-75%. There are no regional wall motion abnormalities. The left ventricular cavity size is normal. Left ventricular diastolic filling was not assessed. Left Atrium: The left atrium is normal in size. Right Ventricle: The right ventricle is slightly enlarged. There is normal right ventricular global systolic function. Right Atrium: The right atrium is normal in size. Aortic Valve: The aortic valve was not well visualized. There is no evidence of aortic valve regurgitation. Mitral Valve: The mitral valve is normal in structure. There is trace mitral valve regurgitation. Tricuspid Valve: The tricuspid valve is structurally normal. There is trace tricuspid regurgitation. The right ventricular systolic pressure is unable to be estimated. Pulmonic Valve: The pulmonic valve is not well visualized. There is physiologic pulmonic valve regurgitation. Pericardium: There is a trivial pericardial effusion. Aorta: The aortic root is normal. Pulmonary Artery: The pulmonary artery is not well visualized. Systemic Veins: The inferior vena cava appears mildly dilated. There is  less than 50% IVC collapse with inspiration. In comparison to the previous echocardiogram(s): Compared with study from 3/21/2024,. RV size is smaller than on 3/21/2024 study. RV systolic function is vigorous.  CONCLUSIONS:  1. Left ventricular systolic function is hyperdynamic with a 70-75% estimated ejection fraction.  2. The pulmonary artery is not well visualized. QUANTITATIVE DATA SUMMARY:  RIGHT VENTRICLE: TAPSE: 127.0 mm TRICUSPID VALVE/RVSP:                           Normal Ranges: Est. RA Pressure: 8 mmHg IVC Diam:         2.30 cm PULMONIC VALVE:                      Normal Ranges: PV Max Harvinder: 1.6 m/s  (0.6-0.9m/s) PV Max P.9 mmHg  [id number] Mynor Richey MD Electronically signed on 3/22/2024 at 11:08:32 AM  ** Final **     XR pelvis 1-2 views    Result Date: 3/22/2024  Interpreted By:  Colleen Keita,  Gulshan Koroma STUDY: XR PELVIS 1-2 VIEWS; ;  3/22/2024 1:39 am   INDICATION: Signs/Symptoms:Confirm placement of L femoral trialysis central line per icu protocol.   COMPARISON: Pelvis radiographs 10/25/2023.   ACCESSION NUMBER(S): [id number]   ORDERING CLINICIAN: NILS LE   FINDINGS: AP view of the pelvis was performed.   Left femoral central venous catheter e tip projects over sacrum. A temperature probe is in place.   Some catheters are partially visualized overlying the right hemipelvis. Some surgical staples are also noted in this area.   Noting that the right side of the pelvis is not fully included within the field of view, there is no acute osseous abnormality.       Limited field of view of the pelvis without acute osseous abnormality.   Left femoral central venous catheter in place with the tip projecting over left sacrum.   Postsurgical changes of the right hemipelvis are partially visualized.   I personally reviewed the images/study and I agree with the findings as stated by resident physician Dr. Ga Noel . This study was interpreted at [facility]  [place], [place].   MACRO: None   Signed by: Lenorajohan Mabrygregory 3/22/2024 1:16 PM Dictation workstation:   [id number]    XR chest 1 view    Result Date: 3/22/2024  Interpreted By:  Bubba Levin and Afshari Mirak Sohrab STUDY: XR CHEST 1 VIEW;  3/22/2024 3:26 am   INDICATION: Signs/Symptoms:daily icu.   COMPARISON: Chest x-ray 03/21/2024.   ACCESSION NUMBER(S): [id number]   ORDERING CLINICIAN: NILS LE   FINDINGS: AP radiograph of the chest was provided.   Endotracheal tube tip is approximately 5.3 cm above the ariadna. An enteric tube courses below the diaphragm with the tip beyond the field of view.   CARDIOMEDIASTINAL SILHOUETTE: Cardiomediastinal silhouette is stable in size and configuration.   LUNGS: Prominent perihilar interstitial markings. There is opacification of the left retrocardiac lung and obscuration of the left costophrenic angle. No pneumothorax.   ABDOMEN: No remarkable upper abdominal findings.   BONES: No acute osseous changes.       1. Pulmonary edema, increased from prior. 2. Opacification of the left retrocardiac lung and obscuration of the left costophrenic angle may represent small pleural effusion with atelectasis/infiltrate. No pneumothorax.   I personally reviewed the images/study and I agree with the findings as stated by resident physician Dr. Ga Noel . This study was interpreted at [place], [place].   MACRO: None   Signed by: Bubba Levin 3/22/2024 7:54 AM Dictation workstation:   [id number]    XR chest 1 view    Result Date: 3/22/2024  Interpreted By:  Bubba Levin and Stephens Katherine STUDY: XR CHEST 1 VIEW;  3/21/2024 9:16 pm   INDICATION: Signs/Symptoms:increased oxygen requirement, anuric.   COMPARISON: Same day chest radiograph from 3:16 a.m.   ACCESSION NUMBER(S): [id number]   ORDERING CLINICIAN: KELLY ELISE   FINDINGS: AP radiograph of the chest was provided.    Endotracheal tube noted with tip projecting approximately 5.3 cm above the ariadna. Enteric tube seen coursing below the level diaphragm with tip out of the field of view.   CARDIOMEDIASTINAL SILHOUETTE: Cardiomediastinal silhouette is stable in size and configuration.   LUNGS: Interval development of retrocardiac opacity. Increased prominence of the perihilar interstitial markings. Blunting of the left costophrenic angle. No pneumothorax.   ABDOMEN: No remarkable upper abdominal findings.   BONES: No acute osseous changes.       1.  Interval development of retrocardiac and left basilar opacity favored to represent effusion and atelectasis. Superimposed infectious/inflammatory process can considered in the appropriate clinical setting. 2. Worsening pulmonary interstitial edema. 3.  Medical devices as described above.   I personally reviewed the images/study and I agree with Isabella Walker DO's (radiology resident) findings as stated. This study was interpreted at [place], [place].   MACRO: None   Signed by: Bubba Levin 3/22/2024 7:52 AM Dictation workstation:   [id number]    Electrocardiogram, 12-lead PRN ACS symptoms    Result Date: 3/21/2024  Sinus tachycardia Left axis deviation Right bundle branch block Inferior infarct (cited on or before 20-MAR-2024) Anterolateral infarct (cited on or before 20-MAR-2024) Abnormal ECG When compared with ECG of 20-MAR-2024 18:41, No significant change was found Confirmed by Damien Choe ([id number]) on 3/21/2024 10:02:55 PM    Electrocardiogram, 12-lead PRN ACS symptoms    Result Date: 3/21/2024  Sinus tachycardia Left axis deviation Right bundle branch block Inferior infarct ** ** ACUTE MI ** ** Anteroseptal infarct ** ** ACUTE MI ** ** Abnormal ECG Confirmed by Damien Choe ([id number]) on 3/21/2024 5:18:06 PM    Transthoracic Echo (TTE) Limited    Result Date: 3/21/2024   [facility], [address] [place] [address]  [place] [address]                Tel [phone] and Fax [phone] TRANSTHORACIC ECHOCARDIOGRAM REPORT  Patient Name:      YVETTE LAWSON            Reading Physician:    [id number] Negron MD Study Date:        3/21/2024            Ordering Provider:    [id number] KELLY ELISE MRN/PID:           [id number]             Fellow:               [id number] Alessandro Wise MD Accession#:        [id number]         Nurse: Date of Birth/Age: 1960 / 63 years Sonographer:          Katelyn Aguiar                                                               RDCS, RVT Gender:            M                    Additional Staff: Height:            177.80 cm            Admit Date:           2/28/2024 Weight:            100.70 kg            Admission Status:     Inpatient - STAT BSA / BMI:         2.18 m2 / 31.85      Encounter#:           [id number]                    kg/m2                                         Department Location:  [facility] Blood Pressure: 119 /55 mmHg Study Type:    TRANSTHORACIC ECHO (TTE) LIMITED Diagnosis/ICD: Cardiac arrest, cause unspecified-I46.9; Other pulmonary embolism                without acute cor pulmonale-I26.99 Indication:    Status post cardiac arrest. PE. Right heart strain. CPT Code:      Echo Limited-74210; Color Doppler-89100; Doppler Limited-42234 Patient History: Pertinent History: Myxoid chondrosarcoma. HTN. DM. HLD. Study Detail: The following Echo studies were performed: 2D, M-Mode, Doppler and               color flow. Technically challenging study due to patient lying in               supine position and body habitus. Definity used as a contrast               agent for endocardial border definition.  PHYSICIAN INTERPRETATION: Left Ventricle: The left ventricular systolic function is normal. There are no  regional wall motion abnormalities. The left ventricular cavity size is normal. Left ventricular diastolic filling was not assessed. Left Atrium: The left atrium is normal in size. Right Ventricle: The right ventricle is moderately enlarged. There is low normal right ventricular systolic function. There is a slight suggestion of a Townsend's sign on the apical views suggestive of RV strain although the function is low normal. Right Atrium: The right atrium is mildly dilated. Aortic Valve: The aortic valve is trileaflet. There is trivial aortic valve regurgitation. Mitral Valve: The mitral valve is normal in structure. Mitral valve regurgitation was not assessed. Tricuspid Valve: The tricuspid valve is structurally normal. There is trace tricuspid regurgitation. The right ventricular systolic pressure is unable to be estimated. Pulmonic Valve: The pulmonic valve is not well visualized. The pulmonic valve regurgitation was not well visualized. Pericardium: There is a trivial pericardial effusion. Aorta: The aortic root is normal. Systemic Veins: The inferior vena cava appears to be of normal size. There is IVC inspiratory collapse greater than 50%. In comparison to the previous echocardiogram(s): There are no prior studies on this patient for comparison purposes.  CONCLUSIONS:  1. Left ventricular systolic function is normal.  2. Poorly visualized anatomical structures due to suboptimal image quality.  3. Moderately enlarged right ventricle.  4. There is a slight suggestion of a Townsend's sign on the apical views suggestive of RV strain although the function is low normal.  5. There is low normal right ventricular systolic function. QUANTITATIVE DATA SUMMARY: 2D MEASUREMENTS:                          Normal Ranges: Ao Root d:     3.50 cm   (2.0-3.7cm) LAs:           3.00 cm   (2.7-4.0cm) IVSd:          1.30 cm   (0.6-1.1cm) LVPWd:         1.00 cm   (0.6-1.1cm) LVIDd:         4.80 cm   (3.9-5.9cm) LVIDs:          2.90 cm LV Mass Index: 94.6 g/m2 LV % FS        39.6 % RA VOLUME BY A/L METHOD:                       Normal Ranges: RA Area A4C: 17.5 cm2 M-MODE MEASUREMENTS:                Normal Ranges: RVIDd: 3.50 cm (0.9-3.6cm) LV SYSTOLIC FUNCTION BY 2D PLANIMETRY (MOD):                     Normal Ranges: EF-A2C View: 59.5 %  RIGHT VENTRICLE: RV Basal 4.80 cm RV Mid   3.70 cm RV Major 8.0 cm TAPSE:   17.0 mm RV s'    0.12 m/s  [id number] Derrell Negron MD Electronically signed on 3/21/2024 at 9:21:52 AM  ** Final **     XR chest 1 view    Result Date: 3/21/2024  Interpreted By:  Nadege Parrish and Afshari Mirak Sohrab STUDY: XR CHEST 1 VIEW;  3/21/2024 3:16 am   INDICATION: Signs/Symptoms:daily icu.   COMPARISON: Chest x-ray 03/20/2024.   ACCESSION NUMBER(S): [id number]   ORDERING CLINICIAN: NILS LE   FINDINGS: AP radiograph of the chest was provided.   Endotracheal tube tip is approximately 4.2 cm above the ariadna.   An enteric tube courses below diaphragm with the tip beyond the field of view.   CARDIOMEDIASTINAL SILHOUETTE: Cardiomediastinal silhouette is normal in size and configuration.   LUNGS: Similar aeration of bilateral lungs with prominent perihilar interstitial markings. Left costophrenic angle is obscured by overlying cardiomediastinal silhouette. No focal consolidation, sizeable pleural effusion or pneumothorax.   ABDOMEN: No remarkable upper abdominal findings.   BONES: No acute osseous changes.       1. Persistent pulmonary edema. Left costophrenic angle is obscured which can be due to overlying cardiomediastinal silhouette versus small pleural effusion. No focal consolidation or pneumothorax.   I personally reviewed the images/study and I agree with the findings as stated by resident physician Dr. Ga Noel . This study was interpreted at [place], [place].   MACRO: None   Signed by: Nadege Parrish 3/21/2024 8:19 AM Dictation workstation:    [id number]    XR abdomen 1 view    Result Date: 3/21/2024  Interpreted By:  Nadege Parrish and Ohs Zachary STUDY: XR ABDOMEN 1 VIEW;  3/20/2024 7:15 pm   INDICATION: Signs/Symptoms:enteral tube placement.   COMPARISON: Chest radiograph 03/06/2024.   ACCESSION NUMBER(S): [id number]   ORDERING CLINICIAN: GILSON DUTTON   FINDINGS: Enteric tube with distal tip and side port overlying the expected location of the stomach.   Nonobstructive bowel gas pattern. No evidence of intra-abdominal free air.   Visualized soft tissues and osseous structures are unremarkable.       1. Enteric tube with distal tip and side port overlying the expected location of the stomach. 2. Nonobstructive bowel gas pattern.   I personally reviewed the images/study and I agree with the findings as stated by Dr. Selvin Marcial. This study was interpreted at [place], [place].   MACRO: none   Signed by: Nadege Parrish 3/21/2024 8:19 AM Dictation workstation:   [id number]    XR chest 1 view    Result Date: 3/21/2024  Interpreted By:  Nadege Parrish and Ohs Zachary STUDY: XR CHEST 1 VIEW;  3/20/2024 7:04 pm   INDICATION: Signs/Symptoms:intubation s/p cardiac arrest.   COMPARISON: Chest radiograph 03/06/2024, CT chest 01/31/2024.   ACCESSION NUMBER(S): [id number]   ORDERING CLINICIAN: GILSON DUTTON   FINDINGS: AP radiograph of the chest was provided.   MEDICAL DEVICES: ETT distal tip 4.2 cm from the ariadna. Enteric tube overlies the expected location of the esophagus and stomach with distal tip not imaged.   CARDIOMEDIASTINAL SILHOUETTE: Heart is similarly enlarged when compared to prior chest radiograph 03/06/2024.   LUNGS: Low lung volumes contribute to bronchovascular crowding. Interval increase in perihilar vascular congestion. Left costophrenic angle is obscured by overlying cardiac silhouette and/or effusion. The right costophrenic angle is clear. No focal consolidation or pneumothorax.   ABDOMEN: No  remarkable upper abdominal findings.   BONES: No acute osseous changes.       1. Interval increase in perihilar vascular congestion. 2. Left costophrenic angle is obscured by overlying cardiac silhouette and/or pleural effusion. 3. Medical devices as above.   I personally reviewed the images/study and I agree with the findings as stated by Dr. Selvin Marcial. This study was interpreted at [facility], [place], [place].   MACRO: None   Signed by: Nadege Parrish 3/21/2024 8:18 AM Dictation workstation:   [id number]      Current Medications  Scheduled medications  albumin human, 25 g, intravenous, q6h  hydrocortisone sodium succinate, 50 mg, intravenous, q8h  mupirocin, , Each Nostril, BID  oxygen, , inhalation, Continuous - Inhalation  [START ON 3/27/2024] pantoprazole, 40 mg, intravenous, Daily  piperacillin-tazobactam, 3.375 g, intravenous, q6h  vancomycin, 1,000 mg, intravenous, q12h      Continuous medications  EPINEPHrine, 0-2 mcg/kg/min (Dosing Weight), Last Rate: 0.08 mcg/kg/min (03/26/24 1000)  epoprostenol, 0.04 mcg/kg/min (Ideal), Last Rate: 0.05 mcg/kg/min (03/26/24 0840)  [Held by provider] heparin, 0-4,000 Units/hr, Last Rate: Stopped (03/26/24 0950)  insulin regular infusion for cardiac surgery, 0-15 Units/hr, Last Rate: 1 Units/hr (03/26/24 1000)  lactated Ringer's, 5 mL/hr, Last Rate: 5 mL/hr (03/26/24 1000)  norepinephrine, 0.01-3 mcg/kg/min (Dosing Weight), Last Rate: 0.06 mcg/kg/min (03/26/24 1000)  PrismaSol 4/2.5, 2,400 mL/hr, Last Rate: 2,400 mL/hr (03/26/24 0246)      PRN medications  PRN medications: alteplase, calcium gluconate, calcium gluconate, dextrose, dextrose, dextrose **OR** glucagon, glucagon, insulin regular, magnesium sulfate, magnesium sulfate, potassium chloride, potassium chloride, vancomycin     Assessment  Jason Hollins 63 y.o. 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 Hawkins and Dr. Smith on 3/5/24.  Post operative course was uncomplicated and patient was on RNF until 3/20 for prolonged bed rest to avoid hip flexion to maintain flap integrity.  Patient was on prophylactic lovenox while on bedrest.    3/20: Now s/p CPR with ROSC after TPA for assumed large PE, overnight started on heparin, epo, increased pressor requirements, increased swelling at flap site.   3/21: MTP transfusions. Return to OR for exploration of R Flap site now s/p Exploration of right thigh wound evacuation of hematoma. Suture ligation of multiple bleeding vessel and several points in gluteal area with Dr. Smith.  3/26: 3 drains in place with continued output but output is slowly downtrending, wound vac remains with no output    Plan/Recommendations  S/p Exploration of right thigh wound evacuation of hematoma 3/21. Suture ligation of multiple bleeding vessel and several points in gluteal area:  A/P:   - Remains critically ill in CTICU on pressors which have been weaned (Levo/Vaso/Epi)  - Maintain incisional wound vac to right gluteal/thigh area x10-14 days post-op       ·  Settings: -75mmHg low continuous suction        ·  Notify plastic surgery with any concerns of leak or obstruction   - OK to resume low dose heparin gtt from plastic standpoint, continue monitoring heparin assay. Management per SICU  - Monitor right thigh for worsening s/s of active bleeding  - Continue ANDERSON drain care per nursing      ·  Strip drain tubing TID and PRN     ·  Monitor and record output q8h      ·  Keep drain sites C/D/I with daily drain dressing changes      ·  Notify plastics if ANDERSON drain output exceeds > 100 ml/hr in one drain or > 300 ml/hr collectively  - Operative findings 3/21: Actively Bleeding Gluteal Arterial Perforators, Generalized Oozing, healthy Flap, EBL 6.5L (3L of which was retained  Hematoma)   - Continue Abx per ICU  - Appreciate remaining care per CTICU/SICU  - Plastics to follow    Patient and plan discussed with Dr. Edwards.    Karen Moreno PA-C  Plastic and Reconstructive Surgery   [place]  Pager #[phone]  Team phones: [phone]

## 2024-03-26 NOTE — CARE PLAN
The patient's goals for the shift include  remain free from harm    The clinical goals for the shift include Pt will work with PT this shift    Over the shift, the patient did not make progress toward the following goals. Barriers to progression include hemodynamic instability. Recommendations to address these barriers include wean pressors as pt tolerates  .

## 2024-03-27 ENCOUNTER — APPOINTMENT (OUTPATIENT)
Dept: RADIOLOGY | Facility: HOSPITAL | Age: 64
DRG: 003 | End: 2024-03-27
Payer: COMMERCIAL

## 2024-03-27 ENCOUNTER — HOSPITAL ENCOUNTER (INPATIENT)
Dept: NEUROLOGY | Facility: HOSPITAL | Age: 64
Discharge: HOME | DRG: 003 | End: 2024-03-27
Payer: COMMERCIAL

## 2024-03-27 LAB
ACT BLD: 351 SEC (ref 89–169)
ALBUMIN SERPL BCP-MCNC: 3.5 G/DL (ref 3.4–5)
ALBUMIN SERPL BCP-MCNC: 3.7 G/DL (ref 3.4–5)
ALBUMIN SERPL BCP-MCNC: 3.8 G/DL (ref 3.4–5)
ALP SERPL-CCNC: 101 U/L (ref 33–136)
ALT SERPL W P-5'-P-CCNC: 656 U/L (ref 10–52)
AMMONIA PLAS-SCNC: 50 UMOL/L (ref 16–53)
AMYLASE SERPL-CCNC: 19 U/L (ref 29–103)
ANION GAP BLDA CALCULATED.4IONS-SCNC: 15 MMO/L (ref 10–25)
ANION GAP BLDA CALCULATED.4IONS-SCNC: 18 MMO/L (ref 10–25)
ANION GAP BLDA CALCULATED.4IONS-SCNC: 19 MMO/L (ref 10–25)
ANION GAP SERPL CALC-SCNC: 18 MMOL/L (ref 10–20)
ANION GAP SERPL CALC-SCNC: 19 MMOL/L (ref 10–20)
APTT PPP: 50 SECONDS (ref 27–38)
APTT PPP: 53 SECONDS (ref 27–38)
APTT PPP: 63 SECONDS (ref 27–38)
AST SERPL W P-5'-P-CCNC: 322 U/L (ref 9–39)
ATRIAL RATE: 144 BPM
BASE EXCESS BLDA CALC-SCNC: -2.2 MMOL/L (ref -2–3)
BASE EXCESS BLDA CALC-SCNC: -2.6 MMOL/L (ref -2–3)
BASE EXCESS BLDA CALC-SCNC: -3.5 MMOL/L (ref -2–3)
BILIRUB DIRECT SERPL-MCNC: 7.7 MG/DL (ref 0–0.3)
BILIRUB SERPL-MCNC: 12.9 MG/DL (ref 0–1.2)
BODY TEMPERATURE: 37 DEGREES CELSIUS
BUN SERPL-MCNC: 31 MG/DL (ref 6–23)
BUN SERPL-MCNC: 33 MG/DL (ref 6–23)
CA-I BLD-SCNC: 1.08 MMOL/L (ref 1.1–1.33)
CA-I BLD-SCNC: 1.09 MMOL/L (ref 1.1–1.33)
CA-I BLDA-SCNC: 1.09 MMOL/L (ref 1.1–1.33)
CA-I BLDA-SCNC: 1.11 MMOL/L (ref 1.1–1.33)
CA-I BLDA-SCNC: 1.12 MMOL/L (ref 1.1–1.33)
CALCIUM SERPL-MCNC: 8.4 MG/DL (ref 8.6–10.6)
CALCIUM SERPL-MCNC: 8.5 MG/DL (ref 8.6–10.6)
CHLORIDE BLDA-SCNC: 101 MMOL/L (ref 98–107)
CHLORIDE BLDA-SCNC: 102 MMOL/L (ref 98–107)
CHLORIDE BLDA-SCNC: 98 MMOL/L (ref 98–107)
CHLORIDE SERPL-SCNC: 100 MMOL/L (ref 98–107)
CHLORIDE SERPL-SCNC: 98 MMOL/L (ref 98–107)
CO2 SERPL-SCNC: 21 MMOL/L (ref 21–32)
CO2 SERPL-SCNC: 21 MMOL/L (ref 21–32)
CREAT SERPL-MCNC: 2.04 MG/DL (ref 0.5–1.3)
CREAT SERPL-MCNC: 2.15 MG/DL (ref 0.5–1.3)
EGFRCR SERPLBLD CKD-EPI 2021: 34 ML/MIN/1.73M*2
EGFRCR SERPLBLD CKD-EPI 2021: 36 ML/MIN/1.73M*2
ERYTHROCYTE [DISTWIDTH] IN BLOOD BY AUTOMATED COUNT: 15.7 % (ref 11.5–14.5)
ERYTHROCYTE [DISTWIDTH] IN BLOOD BY AUTOMATED COUNT: 15.8 % (ref 11.5–14.5)
ERYTHROCYTE [DISTWIDTH] IN BLOOD BY AUTOMATED COUNT: 15.9 % (ref 11.5–14.5)
ERYTHROCYTE [DISTWIDTH] IN BLOOD BY AUTOMATED COUNT: 16.7 % (ref 11.5–14.5)
FIBRINOGEN PPP-MCNC: 180 MG/DL (ref 200–400)
GLUCOSE BLD MANUAL STRIP-MCNC: 142 MG/DL (ref 74–99)
GLUCOSE BLD MANUAL STRIP-MCNC: 147 MG/DL (ref 74–99)
GLUCOSE BLD MANUAL STRIP-MCNC: 147 MG/DL (ref 74–99)
GLUCOSE BLD MANUAL STRIP-MCNC: 148 MG/DL (ref 74–99)
GLUCOSE BLD MANUAL STRIP-MCNC: 159 MG/DL (ref 74–99)
GLUCOSE BLD MANUAL STRIP-MCNC: 159 MG/DL (ref 74–99)
GLUCOSE BLD MANUAL STRIP-MCNC: 160 MG/DL (ref 74–99)
GLUCOSE BLDA-MCNC: 129 MG/DL (ref 74–99)
GLUCOSE BLDA-MCNC: 136 MG/DL (ref 74–99)
GLUCOSE BLDA-MCNC: 146 MG/DL (ref 74–99)
GLUCOSE SERPL-MCNC: 141 MG/DL (ref 74–99)
GLUCOSE SERPL-MCNC: 157 MG/DL (ref 74–99)
HCO3 BLDA-SCNC: 19.4 MMOL/L (ref 22–26)
HCO3 BLDA-SCNC: 20.6 MMOL/L (ref 22–26)
HCO3 BLDA-SCNC: 20.9 MMOL/L (ref 22–26)
HCT VFR BLD AUTO: 21.3 % (ref 41–52)
HCT VFR BLD AUTO: 21.9 % (ref 41–52)
HCT VFR BLD AUTO: 23.6 % (ref 41–52)
HCT VFR BLD AUTO: 23.8 % (ref 41–52)
HCT VFR BLD EST: 23 % (ref 41–52)
HCT VFR BLD EST: 24 % (ref 41–52)
HCT VFR BLD EST: 25 % (ref 41–52)
HGB BLD-MCNC: 7.7 G/DL (ref 13.5–17.5)
HGB BLD-MCNC: 7.7 G/DL (ref 13.5–17.5)
HGB BLD-MCNC: 8 G/DL (ref 13.5–17.5)
HGB BLD-MCNC: 8.6 G/DL (ref 13.5–17.5)
HGB BLDA-MCNC: 7.8 G/DL (ref 13.5–17.5)
HGB BLDA-MCNC: 8.1 G/DL (ref 13.5–17.5)
HGB BLDA-MCNC: 8.2 G/DL (ref 13.5–17.5)
HOLD SPECIMEN: NORMAL
INHALED O2 CONCENTRATION: 40 %
INHALED O2 CONCENTRATION: 40 %
INHALED O2 CONCENTRATION: 50 %
INR PPP: 1.6 (ref 0.9–1.1)
INR PPP: 1.7 (ref 0.9–1.1)
INTERPRETATION FOR ANTI-PLATELET FACTOR 4 ANTIBODY: NEGATIVE
LACTATE BLDA-SCNC: 1.8 MMOL/L (ref 0.4–2)
LACTATE BLDA-SCNC: 2.5 MMOL/L (ref 0.4–2)
LACTATE BLDA-SCNC: 3.2 MMOL/L (ref 0.4–2)
LDH SERPL L TO P-CCNC: 494 U/L (ref 84–246)
LIPASE SERPL-CCNC: 20 U/L (ref 9–82)
MAGNESIUM SERPL-MCNC: 2.43 MG/DL (ref 1.6–2.4)
MAGNESIUM SERPL-MCNC: 2.44 MG/DL (ref 1.6–2.4)
MCH RBC QN AUTO: 28.5 PG (ref 26–34)
MCH RBC QN AUTO: 28.7 PG (ref 26–34)
MCH RBC QN AUTO: 29.9 PG (ref 26–34)
MCH RBC QN AUTO: 30.7 PG (ref 26–34)
MCHC RBC AUTO-ENTMCNC: 32.6 G/DL (ref 32–36)
MCHC RBC AUTO-ENTMCNC: 35.2 G/DL (ref 32–36)
MCHC RBC AUTO-ENTMCNC: 36.1 G/DL (ref 32–36)
MCHC RBC AUTO-ENTMCNC: 37.6 G/DL (ref 32–36)
MCV RBC AUTO: 82 FL (ref 80–100)
MCV RBC AUTO: 82 FL (ref 80–100)
MCV RBC AUTO: 83 FL (ref 80–100)
MCV RBC AUTO: 87 FL (ref 80–100)
NRBC BLD-RTO: 4.5 /100 WBCS (ref 0–0)
NRBC BLD-RTO: 4.7 /100 WBCS (ref 0–0)
NRBC BLD-RTO: 5.4 /100 WBCS (ref 0–0)
NRBC BLD-RTO: 5.9 /100 WBCS (ref 0–0)
OXYHGB MFR BLDA: 95 % (ref 94–98)
OXYHGB MFR BLDA: 96.1 % (ref 94–98)
OXYHGB MFR BLDA: 96.7 % (ref 94–98)
PCO2 BLDA: 26 MM HG (ref 38–42)
PCO2 BLDA: 27 MM HG (ref 38–42)
PCO2 BLDA: 30 MM HG (ref 38–42)
PH BLDA: 7.45 PH (ref 7.38–7.42)
PH BLDA: 7.48 PH (ref 7.38–7.42)
PH BLDA: 7.49 PH (ref 7.38–7.42)
PHOSPHATE SERPL-MCNC: 1.8 MG/DL (ref 2.5–4.9)
PHOSPHATE SERPL-MCNC: 2.2 MG/DL (ref 2.5–4.9)
PLATELET # BLD AUTO: 47 X10*3/UL (ref 150–450)
PLATELET # BLD AUTO: 47 X10*3/UL (ref 150–450)
PLATELET # BLD AUTO: 48 X10*3/UL (ref 150–450)
PLATELET # BLD AUTO: 53 X10*3/UL (ref 150–450)
PO2 BLDA: 115 MM HG (ref 85–95)
PO2 BLDA: 74 MM HG (ref 85–95)
PO2 BLDA: 82 MM HG (ref 85–95)
POTASSIUM BLDA-SCNC: 3.9 MMOL/L (ref 3.5–5.3)
POTASSIUM BLDA-SCNC: 4.1 MMOL/L (ref 3.5–5.3)
POTASSIUM BLDA-SCNC: 4.5 MMOL/L (ref 3.5–5.3)
POTASSIUM SERPL-SCNC: 4.1 MMOL/L (ref 3.5–5.3)
POTASSIUM SERPL-SCNC: 4.2 MMOL/L (ref 3.5–5.3)
PROT SERPL-MCNC: 4.9 G/DL (ref 6.4–8.2)
PROTHROMBIN TIME: 17.9 SECONDS (ref 9.8–12.8)
PROTHROMBIN TIME: 18.7 SECONDS (ref 9.8–12.8)
Q ONSET: 220 MS
QRS COUNT: 25 BEATS
QRS DURATION: 108 MS
QT INTERVAL: 310 MS
QTC CALCULATION(BAZETT): 489 MS
QTC FREDERICIA: 420 MS
R AXIS: 8 DEGREES
RBC # BLD AUTO: 2.61 X10*6/UL (ref 4.5–5.9)
RBC # BLD AUTO: 2.68 X10*6/UL (ref 4.5–5.9)
RBC # BLD AUTO: 2.7 X10*6/UL (ref 4.5–5.9)
RBC # BLD AUTO: 2.88 X10*6/UL (ref 4.5–5.9)
SAO2 % BLDA: 98 % (ref 94–100)
SAO2 % BLDA: 99 % (ref 94–100)
SAO2 % BLDA: 99 % (ref 94–100)
SERUM AND PLATELET FACTOR 4: 0.28 OD UNITS
SODIUM BLDA-SCNC: 133 MMOL/L (ref 136–145)
SODIUM BLDA-SCNC: 133 MMOL/L (ref 136–145)
SODIUM BLDA-SCNC: 134 MMOL/L (ref 136–145)
SODIUM SERPL-SCNC: 133 MMOL/L (ref 136–145)
SODIUM SERPL-SCNC: 136 MMOL/L (ref 136–145)
T AXIS: -12 DEGREES
T OFFSET: 375 MS
TSH SERPL-ACNC: 0.82 MIU/L (ref 0.44–3.98)
UFH PPP CHRO-ACNC: 0.1 IU/ML
VANCOMYCIN TROUGH SERPL-MCNC: 23.1 UG/ML (ref 5–20)
VENTRICULAR RATE: 150 BPM
VIT B12 SERPL-MCNC: >2000 PG/ML (ref 211–911)
WBC # BLD AUTO: 14.7 X10*3/UL (ref 4.4–11.3)
WBC # BLD AUTO: 14.9 X10*3/UL (ref 4.4–11.3)
WBC # BLD AUTO: 14.9 X10*3/UL (ref 4.4–11.3)
WBC # BLD AUTO: 18.5 X10*3/UL (ref 4.4–11.3)

## 2024-03-27 PROCEDURE — 99232 SBSQ HOSP IP/OBS MODERATE 35: CPT | Performed by: NURSE PRACTITIONER

## 2024-03-27 PROCEDURE — 2500000004 HC RX 250 GENERAL PHARMACY W/ HCPCS (ALT 636 FOR OP/ED)

## 2024-03-27 PROCEDURE — 85730 THROMBOPLASTIN TIME PARTIAL: CPT | Performed by: STUDENT IN AN ORGANIZED HEALTH CARE EDUCATION/TRAINING PROGRAM

## 2024-03-27 PROCEDURE — 82140 ASSAY OF AMMONIA: CPT

## 2024-03-27 PROCEDURE — 85027 COMPLETE CBC AUTOMATED: CPT | Performed by: NURSE PRACTITIONER

## 2024-03-27 PROCEDURE — 82947 ASSAY GLUCOSE BLOOD QUANT: CPT

## 2024-03-27 PROCEDURE — 85610 PROTHROMBIN TIME: CPT | Performed by: NURSE PRACTITIONER

## 2024-03-27 PROCEDURE — 99291 CRITICAL CARE FIRST HOUR: CPT

## 2024-03-27 PROCEDURE — 82248 BILIRUBIN DIRECT: CPT

## 2024-03-27 PROCEDURE — 2500000004 HC RX 250 GENERAL PHARMACY W/ HCPCS (ALT 636 FOR OP/ED): Performed by: STUDENT IN AN ORGANIZED HEALTH CARE EDUCATION/TRAINING PROGRAM

## 2024-03-27 PROCEDURE — 90945 DIALYSIS ONE EVALUATION: CPT | Performed by: INTERNAL MEDICINE

## 2024-03-27 PROCEDURE — 2020000001 HC ICU ROOM DAILY

## 2024-03-27 PROCEDURE — C9113 INJ PANTOPRAZOLE SODIUM, VIA: HCPCS | Performed by: PHYSICIAN ASSISTANT

## 2024-03-27 PROCEDURE — 85520 HEPARIN ASSAY: CPT

## 2024-03-27 PROCEDURE — 2500000004 HC RX 250 GENERAL PHARMACY W/ HCPCS (ALT 636 FOR OP/ED): Performed by: PHYSICIAN ASSISTANT

## 2024-03-27 PROCEDURE — 83690 ASSAY OF LIPASE: CPT

## 2024-03-27 PROCEDURE — 2500000005 HC RX 250 GENERAL PHARMACY W/O HCPCS

## 2024-03-27 PROCEDURE — 82607 VITAMIN B-12: CPT

## 2024-03-27 PROCEDURE — 83615 LACTATE (LD) (LDH) ENZYME: CPT

## 2024-03-27 PROCEDURE — P9040 RBC LEUKOREDUCED IRRADIATED: HCPCS

## 2024-03-27 PROCEDURE — 99232 SBSQ HOSP IP/OBS MODERATE 35: CPT

## 2024-03-27 PROCEDURE — 36556 INSERT NON-TUNNEL CV CATH: CPT | Mod: GC | Performed by: STUDENT IN AN ORGANIZED HEALTH CARE EDUCATION/TRAINING PROGRAM

## 2024-03-27 PROCEDURE — 84132 ASSAY OF SERUM POTASSIUM: CPT | Performed by: NURSE PRACTITIONER

## 2024-03-27 PROCEDURE — 74018 RADEX ABDOMEN 1 VIEW: CPT | Performed by: RADIOLOGY

## 2024-03-27 PROCEDURE — 94003 VENT MGMT INPAT SUBQ DAY: CPT

## 2024-03-27 PROCEDURE — 83735 ASSAY OF MAGNESIUM: CPT | Performed by: NURSE PRACTITIONER

## 2024-03-27 PROCEDURE — 36430 TRANSFUSION BLD/BLD COMPNT: CPT

## 2024-03-27 PROCEDURE — 82330 ASSAY OF CALCIUM: CPT

## 2024-03-27 PROCEDURE — 2500000004 HC RX 250 GENERAL PHARMACY W/ HCPCS (ALT 636 FOR OP/ED): Mod: JZ | Performed by: NURSE PRACTITIONER

## 2024-03-27 PROCEDURE — 99291 CRITICAL CARE FIRST HOUR: CPT | Performed by: STUDENT IN AN ORGANIZED HEALTH CARE EDUCATION/TRAINING PROGRAM

## 2024-03-27 PROCEDURE — 90937 HEMODIALYSIS REPEATED EVAL: CPT

## 2024-03-27 PROCEDURE — 37799 UNLISTED PX VASCULAR SURGERY: CPT

## 2024-03-27 PROCEDURE — 74018 RADEX ABDOMEN 1 VIEW: CPT

## 2024-03-27 PROCEDURE — 84443 ASSAY THYROID STIM HORMONE: CPT

## 2024-03-27 PROCEDURE — 83605 ASSAY OF LACTIC ACID: CPT | Performed by: NURSE PRACTITIONER

## 2024-03-27 PROCEDURE — 71045 X-RAY EXAM CHEST 1 VIEW: CPT

## 2024-03-27 PROCEDURE — 94645 CONT INHLJ TX EACH ADDL HOUR: CPT

## 2024-03-27 PROCEDURE — 82150 ASSAY OF AMYLASE: CPT

## 2024-03-27 PROCEDURE — 80202 ASSAY OF VANCOMYCIN: CPT | Performed by: STUDENT IN AN ORGANIZED HEALTH CARE EDUCATION/TRAINING PROGRAM

## 2024-03-27 PROCEDURE — 84207 ASSAY OF VITAMIN B-6: CPT

## 2024-03-27 PROCEDURE — 85384 FIBRINOGEN ACTIVITY: CPT | Performed by: NURSE PRACTITIONER

## 2024-03-27 PROCEDURE — 37799 UNLISTED PX VASCULAR SURGERY: CPT | Performed by: STUDENT IN AN ORGANIZED HEALTH CARE EDUCATION/TRAINING PROGRAM

## 2024-03-27 PROCEDURE — 36556 INSERT NON-TUNNEL CV CATH: CPT | Performed by: STUDENT IN AN ORGANIZED HEALTH CARE EDUCATION/TRAINING PROGRAM

## 2024-03-27 PROCEDURE — 84425 ASSAY OF VITAMIN B-1: CPT

## 2024-03-27 PROCEDURE — 71045 X-RAY EXAM CHEST 1 VIEW: CPT | Performed by: RADIOLOGY

## 2024-03-27 RX ORDER — HEPARIN SODIUM 10000 [USP'U]/100ML
0-4000 INJECTION, SOLUTION INTRAVENOUS CONTINUOUS
Status: DISCONTINUED | OUTPATIENT
Start: 2024-03-27 | End: 2024-04-04

## 2024-03-27 RX ORDER — INSULIN LISPRO 100 [IU]/ML
0-5 INJECTION, SOLUTION INTRAVENOUS; SUBCUTANEOUS EVERY 4 HOURS
Status: DISCONTINUED | OUTPATIENT
Start: 2024-03-27 | End: 2024-03-28

## 2024-03-27 RX ORDER — VANCOMYCIN HYDROCHLORIDE 500 MG/100ML
500 INJECTION, SOLUTION INTRAVENOUS EVERY 12 HOURS
Status: DISCONTINUED | OUTPATIENT
Start: 2024-03-27 | End: 2024-03-27

## 2024-03-27 RX ORDER — HEPARIN SODIUM 1000 [USP'U]/ML
INJECTION, SOLUTION INTRAVENOUS; SUBCUTANEOUS
Status: DISPENSED
Start: 2024-03-27 | End: 2024-03-28

## 2024-03-27 RX ADMIN — CALCIUM CHLORIDE, MAGNESIUM CHLORIDE, DEXTROSE MONOHYDRATE, LACTIC ACID, SODIUM CHLORIDE, SODIUM BICARBONATE AND POTASSIUM CHLORIDE 2400 ML/HR: 3.68; 3.05; 22; 5.4; 6.46; 3.09; .314 INJECTION INTRAVENOUS at 02:01

## 2024-03-27 RX ADMIN — CALCIUM CHLORIDE, MAGNESIUM CHLORIDE, DEXTROSE MONOHYDRATE, LACTIC ACID, SODIUM CHLORIDE, SODIUM BICARBONATE AND POTASSIUM CHLORIDE 2400 ML/HR: 3.68; 3.05; 22; 5.4; 6.46; 3.09; .314 INJECTION INTRAVENOUS at 02:02

## 2024-03-27 RX ADMIN — CALCIUM GLUCONATE 1 G: 20 INJECTION, SOLUTION INTRAVENOUS at 05:11

## 2024-03-27 RX ADMIN — Medication 0.04 MCG/KG/MIN: at 04:15

## 2024-03-27 RX ADMIN — SODIUM PHOSPHATE, MONOBASIC, MONOHYDRATE AND SODIUM PHOSPHATE, DIBASIC, ANHYDROUS 15 MMOL: 142; 276 INJECTION, SOLUTION INTRAVENOUS at 10:33

## 2024-03-27 RX ADMIN — SODIUM PHOSPHATE, MONOBASIC, MONOHYDRATE AND SODIUM PHOSPHATE, DIBASIC, ANHYDROUS 30 MMOL: 142; 276 INJECTION, SOLUTION INTRAVENOUS at 18:28

## 2024-03-27 RX ADMIN — CALCIUM CHLORIDE, MAGNESIUM CHLORIDE, DEXTROSE MONOHYDRATE, LACTIC ACID, SODIUM CHLORIDE, SODIUM BICARBONATE AND POTASSIUM CHLORIDE 2400 ML/HR: 3.68; 3.05; 22; 5.4; 6.46; 3.09; .314 INJECTION INTRAVENOUS at 05:28

## 2024-03-27 RX ADMIN — HYDROCORTISONE SODIUM SUCCINATE 50 MG: 100 INJECTION, POWDER, FOR SOLUTION INTRAMUSCULAR; INTRAVENOUS at 23:03

## 2024-03-27 RX ADMIN — BIVALIRUDIN 0.05 MG/KG/HR: 250 INJECTION, POWDER, LYOPHILIZED, FOR SOLUTION INTRAVENOUS at 00:49

## 2024-03-27 RX ADMIN — MUPIROCIN: 20 OINTMENT TOPICAL at 23:03

## 2024-03-27 RX ADMIN — Medication: at 20:00

## 2024-03-27 RX ADMIN — PANTOPRAZOLE SODIUM 40 MG: 40 INJECTION, POWDER, FOR SOLUTION INTRAVENOUS at 10:00

## 2024-03-27 RX ADMIN — CALCIUM CHLORIDE, MAGNESIUM CHLORIDE, DEXTROSE MONOHYDRATE, LACTIC ACID, SODIUM CHLORIDE, SODIUM BICARBONATE AND POTASSIUM CHLORIDE 2400 ML/HR: 3.68; 3.05; 22; 5.4; 6.46; 3.09; .314 INJECTION INTRAVENOUS at 05:27

## 2024-03-27 RX ADMIN — VANCOMYCIN HYDROCHLORIDE 1000 MG: 1 INJECTION, SOLUTION INTRAVENOUS at 05:11

## 2024-03-27 RX ADMIN — MUPIROCIN: 20 OINTMENT TOPICAL at 10:00

## 2024-03-27 RX ADMIN — CALCIUM CHLORIDE, MAGNESIUM CHLORIDE, DEXTROSE MONOHYDRATE, LACTIC ACID, SODIUM CHLORIDE, SODIUM BICARBONATE AND POTASSIUM CHLORIDE 2400 ML/HR: 3.68; 3.05; 22; 5.4; 6.46; 3.09; .314 INJECTION INTRAVENOUS at 05:26

## 2024-03-27 RX ADMIN — PIPERACILLIN SODIUM AND TAZOBACTAM SODIUM 3.38 G: 3; .375 INJECTION, SOLUTION INTRAVENOUS at 05:11

## 2024-03-27 RX ADMIN — CALCIUM CHLORIDE, MAGNESIUM CHLORIDE, DEXTROSE MONOHYDRATE, LACTIC ACID, SODIUM CHLORIDE, SODIUM BICARBONATE AND POTASSIUM CHLORIDE 2400 ML/HR: 3.68; 3.05; 22; 5.4; 6.46; 3.09; .314 INJECTION INTRAVENOUS at 02:00

## 2024-03-27 RX ADMIN — HEPARIN SODIUM 1000 UNITS/HR: 10000 INJECTION, SOLUTION INTRAVENOUS at 16:42

## 2024-03-27 ASSESSMENT — PAIN - FUNCTIONAL ASSESSMENT
PAIN_FUNCTIONAL_ASSESSMENT: CPOT (CRITICAL CARE PAIN OBSERVATION TOOL)

## 2024-03-27 ASSESSMENT — PAIN SCALES - GENERAL
PAINLEVEL_OUTOF10: 0 - NO PAIN

## 2024-03-27 NOTE — PROGRESS NOTES
Jason Hollins is a 63 y.o. male on day 22 of admission presenting with Soft tissue sarcoma (CMS/HCC).    Subjective   Net negative 1.1L for past 24hrs on CVVH. Weaned off levo and epi infusions overnight. CT C/A/P negative for bleed, Hgb stable at 8.0. Bival infusion initiated, PF4 pending. Neuro exam unchanged: Positive cough and gag on exam. PERRL. Unable to arouse. Not withdrawing to noxious stimuli.    Objective     Physical Exam  Vitals reviewed.   Constitutional:       Appearance: He is ill-appearing.      Interventions: He is sedated, intubated and restrained.      Comments: Intubated. Unable to arouse.   HENT:      Head:      Comments: Edematous face/head/neck     Mouth/Throat:      Mouth: Mucous membranes are moist.      Comments: ETT in place. OG tube present to LIWS, bloody drainage.  Eyes:      General: Scleral icterus present.      Pupils: Pupils are equal, round, and reactive to light.      Comments: Conjunctival edema. Subconjunctival hemorrhage on left.   Cardiovascular:      Rate and Rhythm: Normal rate and regular rhythm.      Pulses:           Radial pulses are 1+ on the right side and 1+ on the left side.        Dorsalis pedis pulses are 1+ on the right side and 1+ on the left side.      Heart sounds: Normal heart sounds.   Pulmonary:      Effort: He is intubated.      Comments: Mechanically ventilated via ETT. Scattered rhonchi, diminished breath sounds bilaterally. Equal chest rise.  Abdominal:      General: Abdomen is protuberant. Bowel sounds are absent. There is distension.      Palpations: Abdomen is soft.      Comments: Obese abdomen.   Genitourinary:     Comments: Penile/scrotal edema.  Skin:     General: Skin is warm and dry.      Comments: Anasarca. Right flap site with wound VAC, no output. ANDERSON x 3 all with serosanguinous output.   Neurological:      Mental Status: He is unresponsive.      GCS: GCS eye subscore is 1. GCS verbal subscore is 1. GCS motor subscore is 1.      Comments:  "Intubated. Unable to arouse. Positive cough and gag on exam. Not withdrawing to noxious stimuli.         Last Recorded Vitals  Blood pressure 135/50, pulse 77, temperature 36.2 °C (97.2 °F), resp. rate (!) 28, height 1.778 m (5' 10\"), weight 140 kg (308 lb 13.8 oz), SpO2 95 %.    VS over last 24h  Heart Rate:  [68-90]   Temp:  [35.9 °C (96.6 °F)-36.7 °C (98.1 °F)]   Resp:  [15-31]   BP: (120-135)/(50)   SpO2:  [92 %-99 %]    Intake/Output last 3 Shifts:  I/O last 3 completed shifts:  In: 4171.5 (29.8 mL/kg) [I.V.:1376.3 (9.8 mL/kg); Blood:1195.2; IV Piggyback:1600]  Out: 5658 (40.4 mL/kg) [Drains:880; Other:4778]  Weight: 140.1 kg       Intake/Output Summary (Last 24 hours) at 3/27/2024 1148  Last data filed at 3/27/2024 1100  Gross per 24 hour   Intake 2079.04 ml   Output 3931 ml   Net -1851.96 ml       Relevant Results  Results for orders placed or performed during the hospital encounter of 03/05/24 (from the past 24 hour(s))   CBC   Result Value Ref Range    WBC 21.4 (H) 4.4 - 11.3 x10*3/uL    nRBC 5.5 (H) 0.0 - 0.0 /100 WBCs    RBC 2.55 (L) 4.50 - 5.90 x10*6/uL    Hemoglobin 7.6 (L) 13.5 - 17.5 g/dL    Hematocrit 20.4 (L) 41.0 - 52.0 %    MCV 80 80 - 100 fL    MCH 29.8 26.0 - 34.0 pg    MCHC 37.3 (H) 32.0 - 36.0 g/dL    RDW 15.9 (H) 11.5 - 14.5 %    Platelets 54 (L) 150 - 450 x10*3/uL   Magnesium   Result Value Ref Range    Magnesium 2.36 1.60 - 2.40 mg/dL   Renal Function Panel   Result Value Ref Range    Glucose 152 (H) 74 - 99 mg/dL    Sodium 133 (L) 136 - 145 mmol/L    Potassium 4.3 3.5 - 5.3 mmol/L    Chloride 97 (L) 98 - 107 mmol/L    Bicarbonate 19 (L) 21 - 32 mmol/L    Anion Gap 21 (H) 10 - 20 mmol/L    Urea Nitrogen 25 (H) 6 - 23 mg/dL    Creatinine 2.08 (H) 0.50 - 1.30 mg/dL    eGFR 35 (L) >60 mL/min/1.73m*2    Calcium 8.3 (L) 8.6 - 10.6 mg/dL    Phosphorus 2.3 (L) 2.5 - 4.9 mg/dL    Albumin 3.9 3.4 - 5.0 g/dL   Calcium, ionized   Result Value Ref Range    POCT Calcium, Ionized 0.98 (L) 1.1 - 1.33 " mmol/L   Blood Gas Arterial Full Panel   Result Value Ref Range    POCT pH, Arterial 7.42 7.38 - 7.42 pH    POCT pCO2, Arterial 33 (L) 38 - 42 mm Hg    POCT pO2, Arterial 90 85 - 95 mm Hg    POCT SO2, Arterial 99 94 - 100 %    POCT Oxy Hemoglobin, Arterial 96.3 94.0 - 98.0 %    POCT Hematocrit Calculated, Arterial 24.0 (L) 41.0 - 52.0 %    POCT Sodium, Arterial 130 (L) 136 - 145 mmol/L    POCT Potassium, Arterial 4.5 3.5 - 5.3 mmol/L    POCT Chloride, Arterial 98 98 - 107 mmol/L    POCT Ionized Calcium, Arterial 1.07 (L) 1.10 - 1.33 mmol/L    POCT Glucose, Arterial 150 (H) 74 - 99 mg/dL    POCT Lactate, Arterial 3.8 (H) 0.4 - 2.0 mmol/L    POCT Base Excess, Arterial -2.7 (L) -2.0 - 3.0 mmol/L    POCT HCO3 Calculated, Arterial 21.4 (L) 22.0 - 26.0 mmol/L    POCT Hemoglobin, Arterial 8.0 (L) 13.5 - 17.5 g/dL    POCT Anion Gap, Arterial 15 10 - 25 mmo/L    Patient Temperature 37.0 degrees Celsius    FiO2 50 %    Peep CHM2O 14.0 cm H2O   POCT GLUCOSE   Result Value Ref Range    POCT Glucose 160 (H) 74 - 99 mg/dL   POCT GLUCOSE   Result Value Ref Range    POCT Glucose 165 (H) 74 - 99 mg/dL   Coagulation Screen   Result Value Ref Range    Protime 17.6 (H) 9.8 - 12.8 seconds    INR 1.6 (H) 0.9 - 1.1    aPTT 46 (H) 27 - 38 seconds   Fibrinogen   Result Value Ref Range    Fibrinogen 195 (L) 200 - 400 mg/dL   CBC   Result Value Ref Range    WBC 19.6 (H) 4.4 - 11.3 x10*3/uL    nRBC 6.5 (H) 0.0 - 0.0 /100 WBCs    RBC 2.46 (L) 4.50 - 5.90 x10*6/uL    Hemoglobin 7.1 (L) 13.5 - 17.5 g/dL    Hematocrit 20.2 (L) 41.0 - 52.0 %    MCV 82 80 - 100 fL    MCH 28.9 26.0 - 34.0 pg    MCHC 35.1 32.0 - 36.0 g/dL    RDW 16.2 (H) 11.5 - 14.5 %    Platelets 57 (L) 150 - 450 x10*3/uL   POCT GLUCOSE   Result Value Ref Range    POCT Glucose 160 (H) 74 - 99 mg/dL   Prepare RBC: 1 Units   Result Value Ref Range    PRODUCT CODE [id number]     Unit Number [id number]     Unit ABO O     Unit RH POS     XM INTEP COMP     Dispense Status TR     Blood  Expiration Date April 18, 2024 23:59 EDT     PRODUCT BLOOD TYPE 5100     UNIT VOLUME 350    POCT GLUCOSE   Result Value Ref Range    POCT Glucose 165 (H) 74 - 99 mg/dL   Prepare RBC: 4 Units   Result Value Ref Range    PRODUCT CODE [id number]     Unit Number [id number]     Unit ABO O     Unit RH POS     XM INTEP COMP     Dispense Status XM     Blood Expiration Date April 12, 2024 23:59 EDT     PRODUCT BLOOD TYPE 5100     UNIT VOLUME 350     PRODUCT CODE [id number]     Unit Number [id number]     Unit ABO O     Unit RH POS     XM INTEP COMP     Dispense Status XM     Blood Expiration Date April 12, 2024 23:59 EDT     PRODUCT BLOOD TYPE 5100     UNIT VOLUME 350     PRODUCT CODE [id number]     Unit Number [id number]     Unit ABO O     Unit RH POS     XM INTEP COMP     Dispense Status XM     Blood Expiration Date April 17, 2024 23:59 EDT     PRODUCT BLOOD TYPE 5100     UNIT VOLUME 350     PRODUCT CODE [id number]     Unit Number [id number]     Unit ABO O     Unit RH POS     XM INTEP COMP     Dispense Status XM     Blood Expiration Date April 17, 2024 23:59 EDT     PRODUCT BLOOD TYPE 5100     UNIT VOLUME 350    POCT GLUCOSE   Result Value Ref Range    POCT Glucose 167 (H) 74 - 99 mg/dL   CBC   Result Value Ref Range    WBC 19.3 (H) 4.4 - 11.3 x10*3/uL    nRBC 5.4 (H) 0.0 - 0.0 /100 WBCs    RBC 2.56 (L) 4.50 - 5.90 x10*6/uL    Hemoglobin 7.6 (L) 13.5 - 17.5 g/dL    Hematocrit 20.8 (L) 41.0 - 52.0 %    MCV 81 80 - 100 fL    MCH 29.7 26.0 - 34.0 pg    MCHC 36.5 (H) 32.0 - 36.0 g/dL    RDW 15.9 (H) 11.5 - 14.5 %    Platelets 50 (L) 150 - 450 x10*3/uL   POCT GLUCOSE   Result Value Ref Range    POCT Glucose 168 (H) 74 - 99 mg/dL   CBC   Result Value Ref Range    WBC 18.5 (H) 4.4 - 11.3 x10*3/uL    nRBC 4.5 (H) 0.0 - 0.0 /100 WBCs    RBC 2.61 (L) 4.50 - 5.90 x10*6/uL    Hemoglobin 8.0 (L) 13.5 - 17.5 g/dL    Hematocrit 21.3 (L) 41.0 - 52.0 %    MCV 82 80 - 100 fL    MCH 30.7 26.0 - 34.0 pg    MCHC 37.6 (H) 32.0 - 36.0  g/dL    RDW 15.9 (H) 11.5 - 14.5 %    Platelets 48 (L) 150 - 450 x10*3/uL   Blood Gas Arterial Full Panel   Result Value Ref Range    POCT pH, Arterial 7.45 (H) 7.38 - 7.42 pH    POCT pCO2, Arterial 30 (L) 38 - 42 mm Hg    POCT pO2, Arterial 115 (H) 85 - 95 mm Hg    POCT SO2, Arterial 99 94 - 100 %    POCT Oxy Hemoglobin, Arterial 96.7 94.0 - 98.0 %    POCT Hematocrit Calculated, Arterial 25.0 (L) 41.0 - 52.0 %    POCT Sodium, Arterial 133 (L) 136 - 145 mmol/L    POCT Potassium, Arterial 4.5 3.5 - 5.3 mmol/L    POCT Chloride, Arterial 98 98 - 107 mmol/L    POCT Ionized Calcium, Arterial 1.12 1.10 - 1.33 mmol/L    POCT Glucose, Arterial 136 (H) 74 - 99 mg/dL    POCT Lactate, Arterial 1.8 0.4 - 2.0 mmol/L    POCT Base Excess, Arterial -2.6 (L) -2.0 - 3.0 mmol/L    POCT HCO3 Calculated, Arterial 20.9 (L) 22.0 - 26.0 mmol/L    POCT Hemoglobin, Arterial 8.2 (L) 13.5 - 17.5 g/dL    POCT Anion Gap, Arterial 19 10 - 25 mmo/L    Patient Temperature 37.0 degrees Celsius    FiO2 50 %   Coagulation Screen   Result Value Ref Range    Protime 18.7 (H) 9.8 - 12.8 seconds    INR 1.7 (H) 0.9 - 1.1    aPTT 50 (H) 27 - 38 seconds   Magnesium   Result Value Ref Range    Magnesium 2.44 (H) 1.60 - 2.40 mg/dL   Renal Function Panel   Result Value Ref Range    Glucose 157 (H) 74 - 99 mg/dL    Sodium 133 (L) 136 - 145 mmol/L    Potassium 4.2 3.5 - 5.3 mmol/L    Chloride 98 98 - 107 mmol/L    Bicarbonate 21 21 - 32 mmol/L    Anion Gap 18 10 - 20 mmol/L    Urea Nitrogen 31 (H) 6 - 23 mg/dL    Creatinine 2.15 (H) 0.50 - 1.30 mg/dL    eGFR 34 (L) >60 mL/min/1.73m*2    Calcium 8.5 (L) 8.6 - 10.6 mg/dL    Phosphorus 2.2 (L) 2.5 - 4.9 mg/dL    Albumin 3.8 3.4 - 5.0 g/dL   Calcium, ionized   Result Value Ref Range    POCT Calcium, Ionized 1.09 (L) 1.1 - 1.33 mmol/L   Vancomycin, Trough   Result Value Ref Range    Vancomycin, Trough 23.1 (HH) 5.0 - 20.0 ug/mL   Hepatic function panel   Result Value Ref Range    Albumin 3.7 3.4 - 5.0 g/dL     Bilirubin, Total 12.9 (H) 0.0 - 1.2 mg/dL    Bilirubin, Direct 7.7 (H) 0.0 - 0.3 mg/dL    Alkaline Phosphatase 101 33 - 136 U/L     (H) 10 - 52 U/L     (H) 9 - 39 U/L    Total Protein 4.9 (L) 6.4 - 8.2 g/dL   Amylase   Result Value Ref Range    Amylase 19 (L) 29 - 103 U/L   Lipase   Result Value Ref Range    Lipase 20 9 - 82 U/L   aPTT   Result Value Ref Range    aPTT 53 (H) 27 - 38 seconds   POCT GLUCOSE   Result Value Ref Range    POCT Glucose 160 (H) 74 - 99 mg/dL   POCT GLUCOSE   Result Value Ref Range    POCT Glucose 159 (H) 74 - 99 mg/dL   POCT GLUCOSE   Result Value Ref Range    POCT Glucose 159 (H) 74 - 99 mg/dL     Scheduled medications  hydrocortisone sodium succinate, 50 mg, intravenous, q12h  mupirocin, , Each Nostril, BID  oxygen, , inhalation, Continuous - Inhalation  pantoprazole, 40 mg, intravenous, Daily  sodium phosphate, 15 mmol, intravenous, Once      Continuous medications  bivalirudin, 0.03-0.07 mg/kg/hr, Last Rate: 0.05 mg/kg/hr (03/27/24 0800)  EPINEPHrine, 0-2 mcg/kg/min (Dosing Weight), Last Rate: Stopped (03/27/24 0319)  epoprostenol, 0.03 mcg/kg/min (Ideal), Last Rate: 0.03 mcg/kg/min (03/27/24 0925)  insulin regular infusion for cardiac surgery, 0-15 Units/hr, Last Rate: 1 Units/hr (03/27/24 0700)  lactated Ringer's, 5 mL/hr, Last Rate: 5 mL/hr (03/27/24 0800)  norepinephrine, 0.01-3 mcg/kg/min (Dosing Weight), Last Rate: Stopped (03/27/24 0250)  PrismaSol 4/2.5, 2,400 mL/hr, Last Rate: 2,400 mL/hr (03/27/24 0528)      PRN medications  PRN medications: alteplase, calcium gluconate, calcium gluconate, dextrose, dextrose, dextrose **OR** glucagon, glucagon, insulin regular, magnesium sulfate, magnesium sulfate            Assessment/Plan   Principal Problem:    Soft tissue sarcoma (CMS/HCC)  Active Problems:    Extraskeletal myxoid chondrosarcoma (CMS/HCC)    Cardiopulmonary arrest (CMS/HCC)    Assessment:  Jason Hollins is a 63 y.o. 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 Hawkins and Dr. Smith on 3/5/24.  Post operative course was uncomplicated and patient was on RNF until 3/20 for prolonged bed rest to avoid hip flexion to maintain flap integrity.  Patient was on prophylactic lovenox while on bedrest.     3/20: Cardiopulmonary arrest s/p CPR with ROSC after TPA, overnight started on heparin, epo, increased pressor requirements, increased swelling at flap site.     3/21: Hemorrhagic shock, MTP. Firm and large right flap site. Return to OR s/p Exploration of right thigh wound, Evacuation of hematoma. Suture ligation of multiple bleeding vessel and several points in gluteal area.     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 Hawkins and Dr. Smith on 3/5/24 s/p cardiopulmonary arrest with ROSC 3/20. Documented to have followed commands off sedation. CT head 3/22 without acute process. Weaned off cisatracurium and propofol overnight 3/23-3/24. Ketamine weaned off 3/25 and Fentanyl weaned off 3/26 am. Repeat CT Head 3/26 without acute process. Neuro exam 3/27 unchanged: Positive cough and gag on exam. PERRL. Unable to arouse. Not withdrawing to noxious stimuli.  - ongoing neuro and pain assessments  - avoid CNS depressants as able  - obtain vEEG  - send encephalopathy labs -> thiamine, Vit B6/B12, TSH, ammonia  - PT/OT -> PROM  - discontinue soft wrist restraints     CV: History of HTN, HLD. Acute cardiopulmonary arrest 2/2 to possible massive PE vs massive STEMI, received multiple rounds of CPR and one defibrillation.  Sustained ROSC achieved after TPA bolus. On high dose levophed,  epinephrine, vaso, angioT2. Plan on 3/21 was for ECMO which was aborted secondary to large hemhorrge at right flap site. Had MTP and taken OR with 5L evacuated. ECHO 3/21: Normal LV, Mod enlarged RV, slight suggestion of a Townsend's sign / RV strain, low normal RV function. ECHO 3/22 with hyperdynamic LV. New onset Afib with RVR overnight 3/22-3/23, dosed with 150mg amio x2, started infusion at 1mg/min thereafter. AT2 weaned off. Amio infusion discontinued 3/25. Lactate downtrending. Vasopressin weaned off overnight. Levo and epi weaned off overnight. Remains in NSR. iEpo at 0.04. Lactate cleared overnight.  - continuous EKG/abp/cvp monitoring  - Goal map range 65-90  - start to wean iEpo -> decrease to 0.03  - wean SDS to q12h  - continue Bival infusion  - continue to trend lactate  - continue aggressive fluid removal with CVVH as hemodynamics permit     PULM: Arrived to SICU intubated julio c-CPR. Concern for massive PE. Started on iEpo, currently on 0.05. Hypoxic respiratory failure. Severe ARDS. ETT exchanged 3/23. CT Chest 3/26 with interval JOHN consolidative/ground glass opacity. Currently on AC/VC 50%, 550, 24, +14.   - ARDSnet lung protective strategies  - Wean FiO2 as tolerated -> wean to 40%  - keep peep at 14  - iEpo as above in CV  - ABGs prn  - additional pulm toilet prn  - daily CXR    ENT: Had epistaxis 3/23, completed afrin bid x3 days  - monitor, low threshold to consult ENT  - avoid NG placement for now     GI: NPO. Shock liver, pancreatitis. Elevated TG. Elevated lactate. Consulted ACS for potential bowel ischemia as cause of persistent lactic acidosis. CT imaging 3/22 with evidence of pancreatitis. No acute surgical indication per ACS. RUQ US 3/22 with thickening of GB wall without cholelithiasis. CT A/P 3/26 negative for acute bleed. LFTs downtrending. Worsening hyperbilirubinemia. Amylase and lipase now WNL.   - start TFs via OG tube -> goal 30ml/hr for now (Nutrition goal 45ml/hr)  - prostat  daily  - PPI for GI ppx  - Trend LFTs daily  - stop trending amylase and lipase     : No history of renal disease. Baseline creatinine 0.6. Anuric RUTH. Elevated CK, downtrending. Metabolic acidosis, total body fluid overload, hyperkalemia. Started on CVVH 3/21, stopped bicarb infusion 3/22. Marino removed 3/24. Hyponatremia. Net negative 1.1L for past 24hrs.  - Nephrology following, appreciate recs  - continue CVVH -> aggressive fluid removal as hemodynamic permit  - RFP BID and PRN  - Replete electrolytes judiciously     HEME: Patient was on ppx lovenox for DVT prophylaxis prior to cardiopulmonary arrest. Concern for massive PE patient received bolus of TPA during CPR. Started on heparin infusion overnight given concern for PE. Hemorrhagic shock 3/21, MTP and back to OR s/p Exploration of right thigh woundEvacuation of hematoma. Suture ligation of multiple bleeding vessel and several points in gluteal area. Hematology consulted 3/22 to r/o HLH. Transfused 1 RBC overnight 3/22-3/23. Thrombocytopenia, coagulapathy. LE Duplex 3/25 +acute occlusive R soleal DVT. Received 2u PRBC and 1u FFP on 3/26. Heparin infusion discontinued 3/26 to r/o HIT and transitioned to bival. Hgb stable overnight.   - cbc, coags bid and prn  - fibrinogen daily  - continue bivalrudin infusion  - PF4 pending  - repeat LDH -> downtrending  - hold off on vitamin K for now  - ongoing monitoring for s/s bleeding  - close surveillance of RLE and drains  - F/U heme recs -> NK function test pending  - Vasc Med consulted, appreciate recs -> r/o HIT    ENDO: History of DM2. A1c 7.5%. TSH WNL 1/2024  - continue insulin drip with q1h BG checks, transition off when more stable     ID:  Hypothermic requiring warming. Leukocytosis. On broad antimicrobial therapy. MRSA screen + 2/28/24. Pan cultures sent 3/22. Sputum NTF, +MRSA screen (3/24), Urine Cx negative. BC NGTD.  - temp q4h, wbc daily  - ongoing monitoring for s/s infection  - discontinue  vanc/zosyn  - mupirocin nasal ointment x5 days  - F/U cultures    Lines: 3/21  - right brachial arterial line (3/20)  - left femoral trialysis (3/22) -> re-site today  - left subclavian MAC with mini MAC (3/20)  - PIV x2     Dispo: Continue ICU care. Patient seen and discussed with ICU attending Dr. Rouse and ICU fellow Dr. Higgins.    Critical care time: 54 minutes    PHILLIP Alejo-CNP  SICU phone [phone]

## 2024-03-27 NOTE — PROGRESS NOTES
Vancomycin Dosing by Pharmacy- Cessation of Therapy    Consult to pharmacy for vancomycin dosing has been discontinued by the prescriber, pharmacy will sign off at this time.    Please call pharmacy if there are further questions or re-enter a consult if vancomycin is resumed.     Vidhi Kelley, [facility]

## 2024-03-27 NOTE — PROGRESS NOTES
Spiritual Care Visit      tried to visit, but the patient was sleeping and no family was present. Will attempt to follow-up later.    Rev. Socrates Velasquez, Supportive Oncology

## 2024-03-27 NOTE — PROGRESS NOTES
"Vancomycin Dosing by Pharmacy- FOLLOW UP    Jason Hollins is a 63 y.o. year old male who Pharmacy has been consulted for vancomycin dosing for other (surgical prophylaxis) . Based on the patient's indication and renal status this patient is being dosed based on a goal trough/random level of 15-20.     Renal function is currently stable. Still on CVVH    Current vancomycin dose: 1000 mg given every 12 hours    Most recent trough level: 23.1 mcg/mL    Visit Vitals  /50   Pulse 81   Temp 36.2 °C (97.2 °F) (Esophageal)   Resp 24        Lab Results   Component Value Date    CREATININE 2.15 (H) 03/27/2024    CREATININE 2.08 (H) 03/26/2024    CREATININE 2.10 (H) 03/26/2024    CREATININE 2.09 (H) 03/25/2024        Patient weight is No results found for: \"PTWEIGHT\"    No results found for: \"CULTURE\"     I/O last 3 completed shifts:  In: 4767.1 (34 mL/kg) [I.V.:2322 (16.6 mL/kg); Blood:1295.2; IV Piggyback:1150]  Out: 4844 (34.6 mL/kg) [Emesis/NG output:50; Drains:910; Other:3884]  Weight: 140.1 kg   [unfilled]    Lab Results   Component Value Date    PATIENTTEMP 37.0 03/27/2024    PATIENTTEMP 37.0 03/26/2024    PATIENTTEMP 37.0 03/26/2024        Assessment/Plan    Above goal random/trough level. Orders placed for new vancomycin regimen of 500 every 12 hours to begin at 1730 .  The next level will be obtained on 3/28 at 1700. May be obtained sooner if clinically indicated.   Will continue to monitor renal function daily while on vancomycin and order serum creatinine at least every 48 hours if not already ordered.  Follow for continued vancomycin needs, clinical response, and signs/symptoms of toxicity.       MADHU BARDALES, PharmD           "

## 2024-03-27 NOTE — PROCEDURES
Central Line    Date/Time: 3/27/2024 6:22 PM    Performed by: Lenny Higgins DO  Authorized by: Lenny Higgins DO    Consent:     Consent obtained:  Verbal (Verbal consent obatined from wife prior to procedure.)    Consent given by:  Spouse    Risks, benefits, and alternatives were discussed: yes      Risks discussed:  Infection and bleeding (dammage to surrounding structures)  Universal protocol:     Procedure explained and questions answered to patient or proxy's satisfaction: yes      Patient identity confirmed:  Arm band  Pre-procedure details:     Indication(s): central venous access and insufficient peripheral access      Hand hygiene: Hand hygiene performed prior to insertion      Sterile barrier technique: All elements of maximal sterile technique followed      Skin preparation:  Chlorhexidine (x2)  Sedation:     Sedation type:  None  Anesthesia:     Anesthesia method:  Local infiltration    Local anesthetic:  Lidocaine 1% w/o epi  Procedure details:     Location:  R internal jugular    Patient position:  Supine (Sand bed unable to placed in Trendelenburg)    Procedural supplies: Trialysis CVC.    Catheter size:  9 Fr    Ultrasound guidance: yes      Ultrasound guidance timing: real time      Sterile ultrasound techniques: Sterile gel and sterile probe covers were used      Number of attempts:  3    Successful placement: yes    Post-procedure details:     Post-procedure:  Line sutured and dressing applied    Assessment:  Blood return through all ports, free fluid flow, no pneumothorax on x-ray and placement verified by x-ray (cinfimred by monometry and with US in two planes)    Procedure completion:  Tolerated

## 2024-03-27 NOTE — PROGRESS NOTES
"Jason Hollins is a 63 y.o. male on day 22 of admission presenting with Soft tissue sarcoma (CMS/HCC).    Subjective   Overnight weaned off pressors.        Objective     Physical Exam    Last Recorded Vitals  Blood pressure 135/50, pulse 79, temperature 36.2 °C (97.2 °F), resp. rate 25, height 1.778 m (5' 10\"), weight 140 kg (308 lb 13.8 oz), SpO2 94 %.  Intake/Output last 3 Shifts:  I/O last 3 completed shifts:  In: 4171.5 (29.8 mL/kg) [I.V.:1376.3 (9.8 mL/kg); Blood:1195.2; IV Piggyback:1600]  Out: 5658 (40.4 mL/kg) [Drains:880; Other:4778]  Weight: 140.1 kg     Relevant Results           This patient currently has cardiac telemetry ordered; if you would like to modify or discontinue the telemetry order, click here to go to the orders activity to modify/discontinue the order.  This patient has a central line   Reason for the central line remaining today? Dialysis/Hemapheresis, Hemodynamic monitoring, and Parenteral medication      This patient is intubated   Reason for patient to remain intubated today? they are unable to protect their airway and they have inadequate gas-exchange without positive pressure        Malnutrition Diagnosis Status: New  Malnutrition Diagnosis: Moderate malnutrition related to acute disease or injury  As Evidenced by: <50% of estimated energy requirements for >5days (no intake x 6 days), +2 edema, current critically ill state.  I agree with the dietitian's malnutrition diagnosis.      Assessment/Plan   Principal Problem:    Soft tissue sarcoma (CMS/HCC)  Active Problems:    Extraskeletal myxoid chondrosarcoma (CMS/HCC)    Cardiopulmonary arrest (CMS/HCC)    63 y.o. 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 Hawkins and Dr. Smith on 3/5/24.  Post operative course was uncomplicated and patient " was on RNF until 3/20 for prolonged bed rest to avoid hip flexion to maintain flap integrity.  Patient was on prophylactic lovenox while on bedrest. Per nursing report, patient was getting to the side of the bed today to get up and walk when he had sudden chest heaviness. Pre-cardiac arrest EKG with signs of anterior ischemia. Nursing was called by family because patient went unresponsive.  ACLS was immediately started. ROSC was achieved after 1 round of CPR, however patient quickly decompensated into PEA.  Airway was secured.  Multiple rounds of CPR were completed with intermittent ROSC prior to transport to CTICU.  Patient arrived to CTICU under SICU care with active CPR underway.  Primary concern for STEMI vs massive PE.  Bedside POCUS TTE with biventricular failure and dilated RV.  RV septal bowing. Decided to give TPA to treat massive PE.  After several minutes of CPR patient achieved  sustained ROSC. Subsequently taken to cath lab where angiography illustrated clean coronaries and no significant PE.      3/20: Now s/p CPR with ROSC after TPA, overnight started on heparin, epo, increased pressor requirements, increased swelling at flap site.   3/21: MTP transfusions. Return to OR for exploration of R Flap site. High pressors. On epo.    3/22 Initiated on CVVH for profound metabolic acidosis, repeat ECHO with hyperdynamic LV, normal RV function, NMB for hypoxemic respiratory failure, CT scans with pancreatitis.      I evaluated the patient with an advanced practice provider. I oversaw this patient's care, and I participated actively their clinical course.     Plan:  Neuro: Off sedation. No meaningful neurologic response to pain. Encephalopathy. Repeat head CT 3/26 without significant findings. Send labs for further metabolic work up. Obtain 24 hour EEG.   Cardiovascular: Undifferentiated shock, resolved. Maintain invasive access; continue to support MAP >65mmHg. Will repeat ECHO tomorrow off inotropic support.  Continue stress dose steroids for possible adrenal insufficiency; however will wean to every 12 hour dosing. Low concern for HLH from hematology consult.   Respiratory: Acute hypoxemic respiratory failure, likely multifactorial in the setting of hypervolemia. Off NMB this morning with FiO2 down to 40%, PEEP 14. Continue slow iepo wean today. Infiltrates on CT. Will consider bronch tomorrow given stable respiratory status and desire to avoid sedation today with EEG.   GI: NPO; PPI. Nutrition consult. TF today. AST/ALT downtrending, slight uptrend in bilirubin. Continue to monitor. CT head, chest, abdomen, and pelvis with worsened pancreatitis. No other acute findings.    : Anuric RUTH; started on RRT for persistent metabolic acidosis. Improving. Bladder scan today. Increase volume removal for hypervolemia.  Endo: Hx DM2. Continue insulin infusion per protocol.   Heme: acute blood loss anemia from surgical limb; concern for recent PE and DVT, had paroxysmal atrial fibrillation, currently in NSR. Thrombocytopenia today. PF4 sent. Bivalrudin initiatied.     ID: On vancomycin/zosyn for broad coverage. Complete a 7 day course of empiric antibiotics. Mupirocin x 5 days for MRSA swab positive. Repeat MRSA was positive.   Dispo: SICU Care; family members and primary surgeon updated. Supportive onc following.      Due to the high probability of life threatening clinical decompensation, the patient required critical care time evaluating and managing this patient.  Critical care time included obtaining a history, examining the patient, ordering and reviewing studies, discussing, developing, and implementing a management plan, evaluating the patient's response to treatment, and discussion with other care team providers. I saw and evaluated the patient myself. I reviewed the provider's documentation and discussed the patient with the provider. Critical care time was performed exclusive of billable procedures.     Critical Care  Time: 42 minutes       I spent 42 minutes in the professional and overall care of this patient.      Yamilex Rouse MD

## 2024-03-27 NOTE — PROGRESS NOTES
Jason Hollins is a 63 y.o. male on day 22 of admission presenting 3/5/24 for resection of right leg myxoid chondrosarcoma. Initially did well postoperatively until 3/20/24 when he had PEA arrest with biventricular failure and RV septal bowing. Received IV tPA for presumed PE with ROSC, and then developed hemorrhagic shock requiring urgent surgical intervention for wound exploration and hematoma evacuation on 3/21/24.  Heparin infusion started 3/23/24.   Underwent US of BLE that showed right soleal DVT 3/25/24, and no evidence of LLE or BUE DVT. .     Subjective   Patient remains intubated, off of pressors today, continues with CVVH via left groin CVC cathete.   Unable to communicate at this time.     Objective   Vascular Medicine Service following for anticoagulation recommendations.     Physical Exam  Resting in bed in NAD  EEG monitoring in place; patient does not open eyes to name called or with gentle shoulder rub  Head, face, neck and bilateral arms remain edematous.   Orally intubated with FiO2 40% via VC/AC vent mode with TV 55 ml, PEEP 13; breath sounds with coarse rales in anterior lung fields; symmetrical chest expansion; maintains SpO2 92%; does not assist vent at this time; RIJ TLC line in place, all ports capped, bloody drainage at insertion site;   Normal heart sounds with regular rate/rhythm; cardiac monitor shows NSR without witnessed ectopy; vital signs are stable without use of vasopressors;  Abdomen obese, soft and nondistended; OG tube to LIWS with bloody drainage in canister  Continues with penile and scrotal edema;  CVVH continues via left femoral Trialysis line  Extremities are warm to touch with palpable bilateral radial and DP pulses; edema noted BUE/BLE; PIV; right brachial arterial line in place; right leg flap with wound vac, no output observed in the collection device.   Patient does not respond to verbal or tactile stimulation at this time.    Last Recorded Vitals  Blood pressure 135/50,  "pulse 84, temperature 36.2 °C (97.2 °F), resp. rate (!) 29, height 1.778 m (5' 10\"), weight 140 kg (308 lb 13.8 oz), SpO2 93 %.  Intake/Output last 3 Shifts:  I/O last 3 completed shifts:  In: 4171.5 (29.8 mL/kg) [I.V.:1376.3 (9.8 mL/kg); Blood:1195.2; IV Piggyback:1600]  Out: 5658 (40.4 mL/kg) [Drains:880; Other:4778]  Weight: 140.1 kg     Relevant Results  Scheduled medications  hydrocortisone sodium succinate, 50 mg, intravenous, q12h  mupirocin, , Each Nostril, BID  oxygen, , inhalation, Continuous - Inhalation  pantoprazole, 40 mg, intravenous, Daily  sodium phosphate, 30 mmol, intravenous, Once    Continuous medications  EPINEPHrine, 0-2 mcg/kg/min (Dosing Weight), Last Rate: Stopped (03/27/24 0319)  epoprostenol, 0.03 mcg/kg/min (Ideal), Last Rate: 0.03 mcg/kg/min (03/27/24 0925)  heparin, 0-4,000 Units/hr  insulin regular infusion for cardiac surgery, 0-15 Units/hr, Last Rate: 1 Units/hr (03/27/24 0700)  lactated Ringer's, 5 mL/hr, Last Rate: 5 mL/hr (03/27/24 1200)  norepinephrine, 0.01-3 mcg/kg/min (Dosing Weight), Last Rate: Stopped (03/27/24 0250)  PrismaSol 4/2.5, 2,400 mL/hr, Last Rate: 2,400 mL/hr (03/27/24 0528)      Results from last 7 days   Lab Units 03/27/24  1320 03/27/24  0252 03/26/24  2148   WBC AUTO x10*3/uL 14.9* 18.5* 19.3*   HEMOGLOBIN g/dL 7.7* 8.0* 7.6*   HEMATOCRIT % 21.9* 21.3* 20.8*   PLATELETS AUTO x10*3/uL 47* 48* 50*       Results from last 7 days   Lab Units 03/27/24  1320 03/27/24  0252 03/26/24  1202   SODIUM mmol/L 136 133* 133*   POTASSIUM mmol/L 4.1 4.2 4.3   CHLORIDE mmol/L 100 98 97*   CO2 mmol/L 21 21 19*   BUN mg/dL 33* 31* 25*   CREATININE mg/dL 2.04* 2.15* 2.08*   GLUCOSE mg/dL 141* 157* 152*   CALCIUM mg/dL 8.4* 8.5* 8.3*   Estimated Creatinine Clearance: 53 mL/min (A) (by C-G formula based on SCr of 2.04 mg/dL (H)).      Results from last 7 days   Lab Units 03/27/24  0503 03/27/24  0252 03/26/24  1700 03/26/24  0008   APTT seconds 53* 50* 46* 198*   INR   --  1.7* " 1.6* 1.7*       Results from last 7 days   Lab Units 03/26/24  0008 03/25/24  0452 03/24/24 2010   ANTI XA UNFRACTIONATED IU/mL 0.3 0.4 0.3      Anti-Plt Factor 4 AB with Serotonin Release Assay Reflex  Order: [id number] - Part of Panel Order [id number]  Status: Final result       Visible to patient: Yes (not seen)    0 Result Notes      Component  Ref Range & Units 1 d ago   Serum and Platelet Factor 4  <0.400 OD Units 0.280   Interpretation for Anti-Platelet Factor 4 Antibody  Negative Negative   Comment: Anti-Platelet Factor 4 Antibody is not detected by LEONARDO assay.  These results should be used in conjunction with clinical findings.  Clinical correlation is recommended.                 Malnutrition Diagnosis Status: New  Malnutrition Diagnosis: Moderate malnutrition related to acute disease or injury  As Evidenced by: <50% of estimated energy requirements for >5days (no intake x 6 days), +2 edema, current critically ill state.  I agree with the dietitian's malnutrition diagnosis.      Assessment/Plan   Principal Problem:    Soft tissue sarcoma (CMS/HCC)  Active Problems:    Extraskeletal myxoid chondrosarcoma (CMS/HCC)    Cardiopulmonary arrest (CMS/HCC)    63 year old male with medical history as above.  Vascular Medicine Service following for anticoagulation recommendations for PE and right soleal DVT.   Review of labs today shows stable hemoglobin 7.7 grams and platelets of 47 K, with serum creatinine 2.04.   Started HIT workup due to concern for progressive thrombocytopenia.   Anti-PF4 level returned today with 0.280 optical density which is a NEGATIVE result.    Continues with Bivalirudin infusion with therapeutic assay today.    Recommendations:  ~Continue Bivalirudin infusion for now.  ~Consider transition from Bivalirudin to Heparin infusion now that anti-PF4 test has been found to be negative; follow nomogram to achieve therapeutic assay 0.3-0.7..  ~Monitor for bleeding.   ~Look for other causes of  thrombocytopenia eg medications, infection/sepsis, cirrhosis, Hepatitis, viral infection etc.     Vascular Medicine Service will sign off; please contact us on pager [phone] for questions, and reconsult us when patient can be transitioned to outpatient anticoagulation therapy.     Plan of care discussed with Dr. Debbie Abdul  Plan of care discussed in person with the ICU Team    PHILLIP Copeland-CNP  Vascular Medicine Service   Pager [phone]

## 2024-03-27 NOTE — PROGRESS NOTES
"    Department of Plastic and Reconstructive Surgery  Daily Progress Note    Patient Name: Jason Hollins  MRN: [id number]  Date:  03/27/24     Subjective  Found resting in CTICU, critically ill, weaned off pressors overnight, sedation now off x24 hours, no purposeful movement. Ventilator support with FiO2 weaned down to 40%. Remains on CRRT.   CT C/A/P done yesterday, no evidence of intraabdominal/pelvic source of bleed.    Overnight Events  NAOE    Objective    Vital Signs  /50   Pulse 77   Temp 36.2 °C (97.2 °F)   Resp (!) 28   Ht 1.778 m (5' 10\")   Wt 140 kg (308 lb 13.8 oz)   SpO2 95%   BMI 44.32 kg/m²      Physical Exam   Constitutional: Intubated, sedation weaned off. No evidence of purposeful movement  ENMT: MMM, ETT in place, OG tube in place  Cardiovascular: RRR on telemetry monitor  Respiratory/Thorax: ETT in place with ventilator support 40% FiO2  Gastrointestinal: abdomen soft, non distended  Genitourinary: CVVHD currently running   Musculoskeletal: no purposeful movement appreciated  Extremities: Generalized edema, Right 4Fr CFA and 7Fr CFV sheath in place  RLE: right thigh edematous, large, no bruising noted, soft and compressible, incisional wound vac in place, holding suction at -75 mmHG, no leak or alarm present, ANDERSON drain x 3 with dark serous output  BLE neurovascular intact, cap refill < 2 sec, +df/pf, DP/PT/ radial pulses 2+, no drainage noted.   Neurological:  HANNAH. intubated   Psychological: HANNAH, intubated  Skin: Warm, dry, intact     Diagnostics   Results for orders placed or performed during the hospital encounter of 03/05/24 (from the past 24 hour(s))   Blood Gas Arterial Full Panel   Result Value Ref Range    POCT pH, Arterial 7.42 7.38 - 7.42 pH    POCT pCO2, Arterial 33 (L) 38 - 42 mm Hg    POCT pO2, Arterial 90 85 - 95 mm Hg    POCT SO2, Arterial 99 94 - 100 %    POCT Oxy Hemoglobin, Arterial 96.3 94.0 - 98.0 %    POCT Hematocrit Calculated, Arterial 24.0 (L) 41.0 - 52.0 %    POCT " Sodium, Arterial 130 (L) 136 - 145 mmol/L    POCT Potassium, Arterial 4.5 3.5 - 5.3 mmol/L    POCT Chloride, Arterial 98 98 - 107 mmol/L    POCT Ionized Calcium, Arterial 1.07 (L) 1.10 - 1.33 mmol/L    POCT Glucose, Arterial 150 (H) 74 - 99 mg/dL    POCT Lactate, Arterial 3.8 (H) 0.4 - 2.0 mmol/L    POCT Base Excess, Arterial -2.7 (L) -2.0 - 3.0 mmol/L    POCT HCO3 Calculated, Arterial 21.4 (L) 22.0 - 26.0 mmol/L    POCT Hemoglobin, Arterial 8.0 (L) 13.5 - 17.5 g/dL    POCT Anion Gap, Arterial 15 10 - 25 mmo/L    Patient Temperature 37.0 degrees Celsius    FiO2 50 %    Peep CHM2O 14.0 cm H2O   POCT GLUCOSE   Result Value Ref Range    POCT Glucose 160 (H) 74 - 99 mg/dL   POCT GLUCOSE   Result Value Ref Range    POCT Glucose 165 (H) 74 - 99 mg/dL   Coagulation Screen   Result Value Ref Range    Protime 17.6 (H) 9.8 - 12.8 seconds    INR 1.6 (H) 0.9 - 1.1    aPTT 46 (H) 27 - 38 seconds   Fibrinogen   Result Value Ref Range    Fibrinogen 195 (L) 200 - 400 mg/dL   CBC   Result Value Ref Range    WBC 19.6 (H) 4.4 - 11.3 x10*3/uL    nRBC 6.5 (H) 0.0 - 0.0 /100 WBCs    RBC 2.46 (L) 4.50 - 5.90 x10*6/uL    Hemoglobin 7.1 (L) 13.5 - 17.5 g/dL    Hematocrit 20.2 (L) 41.0 - 52.0 %    MCV 82 80 - 100 fL    MCH 28.9 26.0 - 34.0 pg    MCHC 35.1 32.0 - 36.0 g/dL    RDW 16.2 (H) 11.5 - 14.5 %    Platelets 57 (L) 150 - 450 x10*3/uL   POCT GLUCOSE   Result Value Ref Range    POCT Glucose 160 (H) 74 - 99 mg/dL   Prepare RBC: 1 Units   Result Value Ref Range    PRODUCT CODE R2963S25     Unit Number [id number]     Unit ABO O     Unit RH POS     XM INTEP COMP     Dispense Status TR     Blood Expiration Date April 18, 2024 23:59 EDT     PRODUCT BLOOD TYPE 5100     UNIT VOLUME 350    POCT GLUCOSE   Result Value Ref Range    POCT Glucose 165 (H) 74 - 99 mg/dL   Prepare RBC: 4 Units   Result Value Ref Range    PRODUCT CODE L5902D23     Unit Number [id number]     Unit ABO O     Unit RH POS     XM INTEP COMP     Dispense Status XM      Blood Expiration Date April 12, 2024 23:59 EDT     PRODUCT BLOOD TYPE 5100     UNIT VOLUME 350     PRODUCT CODE J8113R77     Unit Number [id number]     Unit ABO O     Unit RH POS     XM INTEP COMP     Dispense Status XM     Blood Expiration Date April 12, 2024 23:59 EDT     PRODUCT BLOOD TYPE 5100     UNIT VOLUME 350     PRODUCT CODE W4149M79     Unit Number [id number]     Unit ABO O     Unit RH POS     XM INTEP COMP     Dispense Status XM     Blood Expiration Date April 17, 2024 23:59 EDT     PRODUCT BLOOD TYPE 5100     UNIT VOLUME 350     PRODUCT CODE [id number]     Unit Number [id number]     Unit ABO O     Unit RH POS     XM INTEP COMP     Dispense Status XM     Blood Expiration Date April 17, 2024 23:59 EDT     PRODUCT BLOOD TYPE 5100     UNIT VOLUME 350    POCT GLUCOSE   Result Value Ref Range    POCT Glucose 167 (H) 74 - 99 mg/dL   CBC   Result Value Ref Range    WBC 19.3 (H) 4.4 - 11.3 x10*3/uL    nRBC 5.4 (H) 0.0 - 0.0 /100 WBCs    RBC 2.56 (L) 4.50 - 5.90 x10*6/uL    Hemoglobin 7.6 (L) 13.5 - 17.5 g/dL    Hematocrit 20.8 (L) 41.0 - 52.0 %    MCV 81 80 - 100 fL    MCH 29.7 26.0 - 34.0 pg    MCHC 36.5 (H) 32.0 - 36.0 g/dL    RDW 15.9 (H) 11.5 - 14.5 %    Platelets 50 (L) 150 - 450 x10*3/uL   POCT GLUCOSE   Result Value Ref Range    POCT Glucose 168 (H) 74 - 99 mg/dL   CBC   Result Value Ref Range    WBC 18.5 (H) 4.4 - 11.3 x10*3/uL    nRBC 4.5 (H) 0.0 - 0.0 /100 WBCs    RBC 2.61 (L) 4.50 - 5.90 x10*6/uL    Hemoglobin 8.0 (L) 13.5 - 17.5 g/dL    Hematocrit 21.3 (L) 41.0 - 52.0 %    MCV 82 80 - 100 fL    MCH 30.7 26.0 - 34.0 pg    MCHC 37.6 (H) 32.0 - 36.0 g/dL    RDW 15.9 (H) 11.5 - 14.5 %    Platelets 48 (L) 150 - 450 x10*3/uL   Blood Gas Arterial Full Panel   Result Value Ref Range    POCT pH, Arterial 7.45 (H) 7.38 - 7.42 pH    POCT pCO2, Arterial 30 (L) 38 - 42 mm Hg    POCT pO2, Arterial 115 (H) 85 - 95 mm Hg    POCT SO2, Arterial 99 94 - 100 %    POCT Oxy Hemoglobin, Arterial 96.7 94.0 -  98.0 %    POCT Hematocrit Calculated, Arterial 25.0 (L) 41.0 - 52.0 %    POCT Sodium, Arterial 133 (L) 136 - 145 mmol/L    POCT Potassium, Arterial 4.5 3.5 - 5.3 mmol/L    POCT Chloride, Arterial 98 98 - 107 mmol/L    POCT Ionized Calcium, Arterial 1.12 1.10 - 1.33 mmol/L    POCT Glucose, Arterial 136 (H) 74 - 99 mg/dL    POCT Lactate, Arterial 1.8 0.4 - 2.0 mmol/L    POCT Base Excess, Arterial -2.6 (L) -2.0 - 3.0 mmol/L    POCT HCO3 Calculated, Arterial 20.9 (L) 22.0 - 26.0 mmol/L    POCT Hemoglobin, Arterial 8.2 (L) 13.5 - 17.5 g/dL    POCT Anion Gap, Arterial 19 10 - 25 mmo/L    Patient Temperature 37.0 degrees Celsius    FiO2 50 %   Coagulation Screen   Result Value Ref Range    Protime 18.7 (H) 9.8 - 12.8 seconds    INR 1.7 (H) 0.9 - 1.1    aPTT 50 (H) 27 - 38 seconds   Magnesium   Result Value Ref Range    Magnesium 2.44 (H) 1.60 - 2.40 mg/dL   Renal Function Panel   Result Value Ref Range    Glucose 157 (H) 74 - 99 mg/dL    Sodium 133 (L) 136 - 145 mmol/L    Potassium 4.2 3.5 - 5.3 mmol/L    Chloride 98 98 - 107 mmol/L    Bicarbonate 21 21 - 32 mmol/L    Anion Gap 18 10 - 20 mmol/L    Urea Nitrogen 31 (H) 6 - 23 mg/dL    Creatinine 2.15 (H) 0.50 - 1.30 mg/dL    eGFR 34 (L) >60 mL/min/1.73m*2    Calcium 8.5 (L) 8.6 - 10.6 mg/dL    Phosphorus 2.2 (L) 2.5 - 4.9 mg/dL    Albumin 3.8 3.4 - 5.0 g/dL   Calcium, ionized   Result Value Ref Range    POCT Calcium, Ionized 1.09 (L) 1.1 - 1.33 mmol/L   Vancomycin, Trough   Result Value Ref Range    Vancomycin, Trough 23.1 (HH) 5.0 - 20.0 ug/mL   Hepatic function panel   Result Value Ref Range    Albumin 3.7 3.4 - 5.0 g/dL    Bilirubin, Total 12.9 (H) 0.0 - 1.2 mg/dL    Bilirubin, Direct 7.7 (H) 0.0 - 0.3 mg/dL    Alkaline Phosphatase 101 33 - 136 U/L     (H) 10 - 52 U/L     (H) 9 - 39 U/L    Total Protein 4.9 (L) 6.4 - 8.2 g/dL   Amylase   Result Value Ref Range    Amylase 19 (L) 29 - 103 U/L   Lipase   Result Value Ref Range    Lipase 20 9 - 82 U/L    Lactate dehydrogenase   Result Value Ref Range     (H) 84 - 246 U/L   aPTT   Result Value Ref Range    aPTT 53 (H) 27 - 38 seconds   POCT GLUCOSE   Result Value Ref Range    POCT Glucose 160 (H) 74 - 99 mg/dL   POCT GLUCOSE   Result Value Ref Range    POCT Glucose 159 (H) 74 - 99 mg/dL   POCT GLUCOSE   Result Value Ref Range    POCT Glucose 159 (H) 74 - 99 mg/dL     CT angio chest abdomen pelvis    Result Date: 3/26/2024  Interpreted By:  Hany Adrian  and Jonatan Currie STUDY: CT ANGIO CHEST ABDOMEN PELVIS;  3/26/2024 4:15 pm   INDICATION: Signs/Symptoms:rule out bleeding .   COMPARISON: CT chest abdomen and pelvis 03/22/2024   ACCESSION NUMBER(S): [id number]   ORDERING CLINICIAN: PAPITO VILLEGAS   TECHNIQUE: Axial non-contrast images of the chest, abdomen, and pelvis with coronal and sagittal reformatted images. Axial CT images of the chest, abdomen and pelvis after the intravenous administration of 170 mL of Omnipaque 350 using angiographic technique with coronal and sagittal reformatted images. MIP images were provided and reviewed. 3D reconstructions were created on a separate independent workstation and reviewed.   FINDINGS: There is poor contrast opacification during the arterial and delayed phases.   VASCULATURE:   PULMONARY ARTERIES: Evaluation for pulmonary embolism is limited on this exam secondary to poor contrast opacification and motion artifact.   THORACIC AORTA: Non-contrast images show no evidence of acute intramural hematoma. No thoracic aortic aneurysm or dissection within the limits of this poor contrast opacification exam. No significant thoracic aortic atherosclerosis.   ABDOMINAL AORTA: No definitive evidence of abdominal aortic aneurysm and dissection on this significantly limited poor contrast opacification exam.. No significant abdominal aortic atherosclerosis. The celiac artery and its dominant branches, superior mesenteric artery, inferior mesenteric artery, and  bilateral renal arteries appear patent on this exam.   ABDOMINAL AND PELVIC ARTERIES:  No definitive evidence of hemodynamically significant stenosis or occlusion within the limits of this poor contrast opacification exam. There is mild stenosis of the left common femoral artery. Otherwise common femoral, internal iliac, external iliac, common femoral, superficial femoral, and profunda arteries appear patent.   There are no findings on early arterial or delayed venous phase imaging to suggest a specific site of active bleeding.     CT CHEST:   MEDIASTINUM AND LYMPH NODES:  No enlarged intrathoracic or axillary lymph nodes. No pneumomediastinum.   HEART: Stable mild enlargement.  No coronary artery calcifications. Interval improvement of the previous pericardial effusion.   LUNG, PLEURA, LARGE AIRWAYS: The endotracheal tube is 3.8 cm above the ariadna. No  pneumothorax. Redemonstrated bilateral moderate pleural effusions with associated atelectasis. There is complete atelectasis of the left lower lobe. There is motion artifact the bilateral lungs that limits the evaluation. There appears to be mosaic/ground-glass appearance of the bilateral lungs. There is worsened left upper lobe consolidative/ground-glass opacities compared to prior exam. Underlying infectious process can not be completely excluded.   OSSEOUS STRUCTURES/CHEST WALL:  No acute osseous abnormality. There is redemonstrated marked asymmetric right-greater-than-left nodularity along the anterior chest wall. This finding is stable compared to prior exam. The differential would still include dilated lymphatic channels, sequela of trauma, and soft tissue nodules.     CT ABDOMEN/PELVIS:   LIVER: No significant parenchymal abnormality. The liver is borderline enlarged on this exam.   BILE DUCTS: No significant intrahepatic or extrahepatic dilatation.   GALLBLADDER: There is hyperdense material within the gallbladder that likely represents gallbladder sludge.  There is no evidence of gallbladder wall thickening. There is mild pericholecystic fluid that might be related to volume overload status..   PANCREAS: The pancreas is enlarged compared to prior exam with more surrounding indistinct haziness and fat stranding. These findings are compatible with worsened acute pancreatitis. There is no evidence of associated fluid collections or hypodense segments of the pancreas.   SPLEEN: No significant abnormality. No evidence of splenic laceration or splenic defects.   ADRENALS: No significant abnormality.   KIDNEYS, URETERS, BLADDER: There is a small 1.1 cm hypodense left superior pole lesion with simple free fluid attenuation that likely represents a simple renal cyst. No hydronephrosis or nephroureterolithiasis. Urinary bladder is underdistended.   REPRODUCTIVE ORGANS: No significant abnormality.   VESSELS: (See above). No additional significant abnormality.   RETROPERITONEUM/LYMPH NODES: No enlarged lymph nodes.   BOWEL/MESENTERY/PERITONEUM: Enteric tube terminates within the gastric body. No inflammatory bowel wall thickening or dilatation of the large bowel. There is similar indistinct haziness of the 2nd and 3rd portions of the duodenal near the pancreas that may be reactive in etiology.  Appendix is not definitively visualized but there is lack of pericecal fat stranding.   No free air or fluid collections. There is increased intra-abdominal ascites compared to prior exam surrounding the perihepatic, perisplenic, bilateral pericolic gutters, and pelvis. There is equivocal hyperdensity within the left anterior pelvic ascites (series 601, image 80). No definitive source of bleed was evident.   ABDOMINAL WALL: There is mildly worsened diffuse abdominal wall and lower extremity anasarca compared to the prior exam. There is a redemonstrated 2.9 x 1.5 cm hyperattenuating oval-shaped structure within the left inguinal canal that may represent lymphadenopathy or hematoma secondary  to line placement.   OSSEOUS STRUCTURES:  No acute osseous abnormality. Redemonstrated punctate hyperdensities within the sacrum that likely represent bone islands. Stable mild degenerative changes of the lower thoracic and lumbar spine.       Within the exam limitations of poor contrast bolus timing and patient motion, there is no definite site of active bleeding. There is equivocal hyperdense left anterior pelvis ascites that could suggest blood products. No definitive source of hemoperitoneum could be identified.   No thoracic or abdominal aortic aneurysm or acute aortic pathology within the limitations of poor bolus timing and patient motion. Additional vascular findings as described above.   Pancreas is intervally enlarged with increased indistinct haziness and fat stranding compared to prior exam. These findings could be compatible with worsening acute pancreatitis.   Constellation of findings of bilateral pulmonary effusions, ascites, and diffuse body wall anasarca as described above that suggest worsened volume overload. The abdominopelvic ascites and diffuse body wall anasarca have worsened compared to prior exam.   Evaluation of the lungs was limited by motion. There is redemonstrated mosaic/ground-glass appearance of the bilateral lungs. Interval development of left upper lobe consolidative/ground-glass opacities. Underlying infectious process can not be excluded.   Redemonstrated asymmetric right-greater-than-left nodularity with differential of dilated lymphatic channels, sequela of trauma, and soft tissue nodules. Attention on follow-up exams.   Additional findings as described above.   MACRO: None.   Signed by: Hany Adrian 3/26/2024 6:25 PM Dictation workstation:   [id number]    CT head wo IV contrast    Result Date: 3/26/2024  Interpreted By:  Skui Cai, STUDY: CT HEAD WO IV CONTRAST;  3/26/2024 4:15 pm   INDICATION: Signs/Symptoms:rule out anoxic injury vs bleed.   COMPARISON: March  22, 2024   ACCESSION NUMBER(S): [id number]   ORDERING CLINICIAN: PAPITO VILLEGAS   TECHNIQUE: Noncontrast axial CT scan of head was performed. Angled reformats in brain and bone windows were generated. The images were reviewed in bone, brain, blood and soft tissue windows. Coronal and sagittal reformats were provided for review.   FINDINGS: The examination is somewhat limited due to patient body habitus and associated artifact.   CSF Spaces: The ventricles, sulci and basal cisterns are within normal limits.  There is no extraaxial fluid collection.   Parenchyma:  The posterior fossa is degraded by beam hardening artifact. The gray/white matter differentiation is grossly preserved but assessment is somewhat limited due to suboptimal signal to noise. There is no mass effect, midline shift, or acute intracranial hemorrhage.   Calvarium: The calvarium is unremarkable.   Paranasal sinuses and mastoids: There is opacification of numerous bilateral mastoid air cells and at least partial opacification of the middle ear cavities. Fluid is noted within the left maxillary and bilateral sphenoid sinuses. Some of the fluid is again noted to be hyperdense which may be related to blood products. There is mucosal thickening within scattered paranasal sinuses. Endotracheal and enteric tubes are partially visualized. There are secretions in the nasopharynx.       No definitive evidence of acute cortical infarct, mass effect, or acute intracranial hemorrhage. Given the patient's history, MRI with diffusion-weighted imaging may be of benefit for further evaluation.   MACRO: None   Signed by: Suki Cai 3/26/2024 4:54 PM Dictation workstation:   [id number]    Current Medications  Scheduled medications  hydrocortisone sodium succinate, 50 mg, intravenous, q12h  mupirocin, , Each Nostril, BID  oxygen, , inhalation, Continuous - Inhalation  pantoprazole, 40 mg, intravenous, Daily  sodium phosphate, 15 mmol, intravenous,  Once      Continuous medications  bivalirudin, 0.03-0.07 mg/kg/hr, Last Rate: 0.05 mg/kg/hr (03/27/24 0800)  EPINEPHrine, 0-2 mcg/kg/min (Dosing Weight), Last Rate: Stopped (03/27/24 0319)  epoprostenol, 0.03 mcg/kg/min (Ideal), Last Rate: 0.03 mcg/kg/min (03/27/24 0925)  insulin regular infusion for cardiac surgery, 0-15 Units/hr, Last Rate: 1 Units/hr (03/27/24 0700)  lactated Ringer's, 5 mL/hr, Last Rate: 5 mL/hr (03/27/24 0800)  norepinephrine, 0.01-3 mcg/kg/min (Dosing Weight), Last Rate: Stopped (03/27/24 0250)  PrismaSol 4/2.5, 2,400 mL/hr, Last Rate: 2,400 mL/hr (03/27/24 0528)      PRN medications  PRN medications: alteplase, calcium gluconate, calcium gluconate, dextrose, dextrose, dextrose **OR** glucagon, glucagon, insulin regular, magnesium sulfate, magnesium sulfate     Assessment  Jason Hollins 63 y.o. 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 Hawkins and Dr. Smith on 3/5/24.  Post operative course was uncomplicated and patient was on RNF until 3/20 for prolonged bed rest to avoid hip flexion to maintain flap integrity.  Patient was on prophylactic lovenox while on bedrest.    3/20: Now s/p CPR with ROSC after TPA for assumed large PE, overnight started on heparin, epo, increased pressor requirements, increased swelling at flap site.   3/21: MTP transfusions. Return to OR for exploration of R Flap site now s/p Exploration of right thigh wound evacuation of hematoma. Suture ligation of multiple bleeding vessel and several points in gluteal area with Dr. Smith.  3/26: 3 drains in place with continued output but output is slowly downtrending, wound vac remains with no output    Plan/Recommendations  S/p Exploration of right thigh wound evacuation of hematoma 3/21. Suture ligation of multiple bleeding vessel  and several points in gluteal area:  A/P:   - Remains critically ill in CTICU       ·  remains intubated, without sedation       ·  pressors weaned off       ·  CRRT       ·  Bivalrudin gtt for AC, vascular medicine following   - Maintain incisional wound vac to right gluteal/thigh area x10-14 days post-op       ·  Settings: -75mmHg low continuous suction        ·  Notify plastic surgery with any concerns of leak or obstruction   - Monitor right thigh for worsening s/s of active bleeding  - Continue ANDERSON drain care per nursing      ·  Strip drain tubing TID and PRN     ·  Monitor and record output q8h      ·  Keep drain sites C/D/I with daily drain dressing changes      ·  Notify plastics if ANDERSON drain output exceeds > 100 ml/hr in one drain or > 300 ml/hr collectively  - Operative findings 3/21: Actively Bleeding Gluteal Arterial Perforators, Generalized Oozing, healthy Flap, EBL 6.5L (3L of which was retained Hematoma)   - Continue Abx per ICU  - Appreciate remaining care per CTICU/SICU  - Plastics to follow     Patient and plan discussed with Dr. Edwards.       PHILLIP Dalal-CNP   Plastic and Reconstructive Surgery   [place]  Pager #[phone]  Team phones: [phone]

## 2024-03-28 ENCOUNTER — APPOINTMENT (OUTPATIENT)
Dept: RADIOLOGY | Facility: HOSPITAL | Age: 64
DRG: 003 | End: 2024-03-28
Payer: COMMERCIAL

## 2024-03-28 PROBLEM — J96.01 ACUTE RESPIRATORY FAILURE WITH HYPOXIA (MULTI): Status: ACTIVE | Noted: 2024-02-28

## 2024-03-28 LAB
ABO GROUP (TYPE) IN BLOOD: NORMAL
ALBUMIN SERPL BCP-MCNC: 3.3 G/DL (ref 3.4–5)
ALBUMIN SERPL BCP-MCNC: 3.4 G/DL (ref 3.4–5)
ALBUMIN SERPL BCP-MCNC: 3.5 G/DL (ref 3.4–5)
ALP SERPL-CCNC: 124 U/L (ref 33–136)
ALT SERPL W P-5'-P-CCNC: 492 U/L (ref 10–52)
ANION GAP BLDA CALCULATED.4IONS-SCNC: 14 MMO/L (ref 10–25)
ANION GAP BLDA CALCULATED.4IONS-SCNC: 14 MMO/L (ref 10–25)
ANION GAP BLDA CALCULATED.4IONS-SCNC: 17 MMO/L (ref 10–25)
ANION GAP BLDA CALCULATED.4IONS-SCNC: 17 MMO/L (ref 10–25)
ANION GAP SERPL CALC-SCNC: 18 MMOL/L (ref 10–20)
ANION GAP SERPL CALC-SCNC: 19 MMOL/L (ref 10–20)
ANTIBODY SCREEN: NORMAL
APTT PPP: 105 SECONDS (ref 27–38)
APTT PPP: 111 SECONDS (ref 27–38)
AST SERPL W P-5'-P-CCNC: 198 U/L (ref 9–39)
BASE EXCESS BLDA CALC-SCNC: -1.4 MMOL/L (ref -2–3)
BASE EXCESS BLDA CALC-SCNC: -2.8 MMOL/L (ref -2–3)
BASE EXCESS BLDA CALC-SCNC: -3.1 MMOL/L (ref -2–3)
BASE EXCESS BLDA CALC-SCNC: -5 MMOL/L (ref -2–3)
BILIRUB DIRECT SERPL-MCNC: 8.5 MG/DL (ref 0–0.3)
BILIRUB SERPL-MCNC: 14.6 MG/DL (ref 0–1.2)
BLOOD EXPIRATION DATE: NORMAL
BODY TEMPERATURE: 37 DEGREES CELSIUS
BUN SERPL-MCNC: 39 MG/DL (ref 6–23)
BUN SERPL-MCNC: 42 MG/DL (ref 6–23)
CA-I BLD-SCNC: 1.03 MMOL/L (ref 1.1–1.33)
CA-I BLD-SCNC: 1.06 MMOL/L (ref 1.1–1.33)
CA-I BLDA-SCNC: 1.03 MMOL/L (ref 1.1–1.33)
CA-I BLDA-SCNC: 1.07 MMOL/L (ref 1.1–1.33)
CA-I BLDA-SCNC: 1.1 MMOL/L (ref 1.1–1.33)
CA-I BLDA-SCNC: 1.11 MMOL/L (ref 1.1–1.33)
CALCIUM SERPL-MCNC: 8.1 MG/DL (ref 8.6–10.6)
CALCIUM SERPL-MCNC: 8.3 MG/DL (ref 8.6–10.6)
CHLORIDE BLDA-SCNC: 100 MMOL/L (ref 98–107)
CHLORIDE BLDA-SCNC: 102 MMOL/L (ref 98–107)
CHLORIDE BLDA-SCNC: 103 MMOL/L (ref 98–107)
CHLORIDE BLDA-SCNC: 103 MMOL/L (ref 98–107)
CHLORIDE SERPL-SCNC: 100 MMOL/L (ref 98–107)
CHLORIDE SERPL-SCNC: 102 MMOL/L (ref 98–107)
CO2 SERPL-SCNC: 21 MMOL/L (ref 21–32)
CO2 SERPL-SCNC: 22 MMOL/L (ref 21–32)
CREAT SERPL-MCNC: 2.14 MG/DL (ref 0.5–1.3)
CREAT SERPL-MCNC: 2.25 MG/DL (ref 0.5–1.3)
DISPENSE STATUS: NORMAL
EGFRCR SERPLBLD CKD-EPI 2021: 32 ML/MIN/1.73M*2
EGFRCR SERPLBLD CKD-EPI 2021: 34 ML/MIN/1.73M*2
ERYTHROCYTE [DISTWIDTH] IN BLOOD BY AUTOMATED COUNT: 15.4 % (ref 11.5–14.5)
ERYTHROCYTE [DISTWIDTH] IN BLOOD BY AUTOMATED COUNT: 15.9 % (ref 11.5–14.5)
FERRITIN SERPL-MCNC: 696 NG/ML (ref 20–300)
FIBRINOGEN PPP-MCNC: 169 MG/DL (ref 200–400)
FIBRINOGEN PPP-MCNC: 171 MG/DL (ref 200–400)
GLUCOSE BLD MANUAL STRIP-MCNC: 166 MG/DL (ref 74–99)
GLUCOSE BLD MANUAL STRIP-MCNC: 172 MG/DL (ref 74–99)
GLUCOSE BLD MANUAL STRIP-MCNC: 185 MG/DL (ref 74–99)
GLUCOSE BLD MANUAL STRIP-MCNC: 200 MG/DL (ref 74–99)
GLUCOSE BLD MANUAL STRIP-MCNC: 223 MG/DL (ref 74–99)
GLUCOSE BLD MANUAL STRIP-MCNC: 238 MG/DL (ref 74–99)
GLUCOSE BLDA-MCNC: 173 MG/DL (ref 74–99)
GLUCOSE BLDA-MCNC: 200 MG/DL (ref 74–99)
GLUCOSE BLDA-MCNC: 208 MG/DL (ref 74–99)
GLUCOSE BLDA-MCNC: 253 MG/DL (ref 74–99)
GLUCOSE SERPL-MCNC: 181 MG/DL (ref 74–99)
GLUCOSE SERPL-MCNC: 210 MG/DL (ref 74–99)
HCO3 BLDA-SCNC: 18.1 MMOL/L (ref 22–26)
HCO3 BLDA-SCNC: 19.8 MMOL/L (ref 22–26)
HCO3 BLDA-SCNC: 20.2 MMOL/L (ref 22–26)
HCO3 BLDA-SCNC: 21.8 MMOL/L (ref 22–26)
HCT VFR BLD AUTO: 23.9 % (ref 41–52)
HCT VFR BLD AUTO: 23.9 % (ref 41–52)
HCT VFR BLD EST: 26 % (ref 41–52)
HCT VFR BLD EST: 26 % (ref 41–52)
HCT VFR BLD EST: 27 % (ref 41–52)
HCT VFR BLD EST: 27 % (ref 41–52)
HGB BLD-MCNC: 8.5 G/DL (ref 13.5–17.5)
HGB BLD-MCNC: 8.7 G/DL (ref 13.5–17.5)
HGB BLDA-MCNC: 8.7 G/DL (ref 13.5–17.5)
HGB BLDA-MCNC: 8.8 G/DL (ref 13.5–17.5)
HGB BLDA-MCNC: 8.9 G/DL (ref 13.5–17.5)
HGB BLDA-MCNC: 8.9 G/DL (ref 13.5–17.5)
INHALED O2 CONCENTRATION: 40 %
INR PPP: 1.5 (ref 0.9–1.1)
INR PPP: 1.5 (ref 0.9–1.1)
LACTATE BLDA-SCNC: 2.2 MMOL/L (ref 0.4–2)
LACTATE BLDA-SCNC: 2.3 MMOL/L (ref 0.4–2)
LACTATE BLDA-SCNC: 2.6 MMOL/L (ref 0.4–2)
LACTATE BLDA-SCNC: 2.8 MMOL/L (ref 0.4–2)
LACTATE BLDV-SCNC: 3 MMOL/L (ref 0.4–2)
MAGNESIUM SERPL-MCNC: 2.52 MG/DL (ref 1.6–2.4)
MAGNESIUM SERPL-MCNC: 2.64 MG/DL (ref 1.6–2.4)
MCH RBC QN AUTO: 29.3 PG (ref 26–34)
MCH RBC QN AUTO: 29.8 PG (ref 26–34)
MCHC RBC AUTO-ENTMCNC: 35.6 G/DL (ref 32–36)
MCHC RBC AUTO-ENTMCNC: 36.4 G/DL (ref 32–36)
MCV RBC AUTO: 82 FL (ref 80–100)
MCV RBC AUTO: 82 FL (ref 80–100)
NRBC BLD-RTO: 3 /100 WBCS (ref 0–0)
NRBC BLD-RTO: 4 /100 WBCS (ref 0–0)
OXYHGB MFR BLDA: 95.9 % (ref 94–98)
OXYHGB MFR BLDA: 96.4 % (ref 94–98)
OXYHGB MFR BLDA: 96.9 % (ref 94–98)
OXYHGB MFR BLDA: 96.9 % (ref 94–98)
PCO2 BLDA: 26 MM HG (ref 38–42)
PCO2 BLDA: 26 MM HG (ref 38–42)
PCO2 BLDA: 29 MM HG (ref 38–42)
PCO2 BLDA: 30 MM HG (ref 38–42)
PH BLDA: 7.45 PH (ref 7.38–7.42)
PH BLDA: 7.45 PH (ref 7.38–7.42)
PH BLDA: 7.47 PH (ref 7.38–7.42)
PH BLDA: 7.49 PH (ref 7.38–7.42)
PHOSPHATE SERPL-MCNC: 2.7 MG/DL (ref 2.5–4.9)
PHOSPHATE SERPL-MCNC: 2.7 MG/DL (ref 2.5–4.9)
PLATELET # BLD AUTO: 47 X10*3/UL (ref 150–450)
PLATELET # BLD AUTO: 51 X10*3/UL (ref 150–450)
PO2 BLDA: 118 MM HG (ref 85–95)
PO2 BLDA: 122 MM HG (ref 85–95)
PO2 BLDA: 123 MM HG (ref 85–95)
PO2 BLDA: 93 MM HG (ref 85–95)
POTASSIUM BLDA-SCNC: 4.1 MMOL/L (ref 3.5–5.3)
POTASSIUM BLDA-SCNC: 4.3 MMOL/L (ref 3.5–5.3)
POTASSIUM BLDA-SCNC: 4.4 MMOL/L (ref 3.5–5.3)
POTASSIUM BLDA-SCNC: 4.5 MMOL/L (ref 3.5–5.3)
POTASSIUM SERPL-SCNC: 4.2 MMOL/L (ref 3.5–5.3)
POTASSIUM SERPL-SCNC: 4.5 MMOL/L (ref 3.5–5.3)
PRODUCT BLOOD TYPE: 5100
PRODUCT CODE: NORMAL
PROT SERPL-MCNC: 5.2 G/DL (ref 6.4–8.2)
PROTHROMBIN TIME: 17.2 SECONDS (ref 9.8–12.8)
PROTHROMBIN TIME: 17.5 SECONDS (ref 9.8–12.8)
RBC # BLD AUTO: 2.9 X10*6/UL (ref 4.5–5.9)
RBC # BLD AUTO: 2.92 X10*6/UL (ref 4.5–5.9)
RH FACTOR (ANTIGEN D): NORMAL
SAO2 % BLDA: 100 % (ref 94–100)
SAO2 % BLDA: 100 % (ref 94–100)
SAO2 % BLDA: 98 % (ref 94–100)
SAO2 % BLDA: 99 % (ref 94–100)
SODIUM BLDA-SCNC: 132 MMOL/L (ref 136–145)
SODIUM BLDA-SCNC: 133 MMOL/L (ref 136–145)
SODIUM BLDA-SCNC: 133 MMOL/L (ref 136–145)
SODIUM BLDA-SCNC: 134 MMOL/L (ref 136–145)
SODIUM SERPL-SCNC: 135 MMOL/L (ref 136–145)
SODIUM SERPL-SCNC: 138 MMOL/L (ref 136–145)
SOL IL2 RECEP SERPL-MCNC: ABNORMAL PG/ML (ref 175.3–858.2)
UFH PPP CHRO-ACNC: 0.2 IU/ML
UFH PPP CHRO-ACNC: 0.3 IU/ML
UFH PPP CHRO-ACNC: 0.3 IU/ML
UNIT ABO: NORMAL
UNIT NUMBER: NORMAL
UNIT RH: NORMAL
UNIT VOLUME: 350
WBC # BLD AUTO: 15.6 X10*3/UL (ref 4.4–11.3)
WBC # BLD AUTO: 16.2 X10*3/UL (ref 4.4–11.3)
XM INTEP: NORMAL

## 2024-03-28 PROCEDURE — 84132 ASSAY OF SERUM POTASSIUM: CPT | Performed by: NURSE PRACTITIONER

## 2024-03-28 PROCEDURE — 99221 1ST HOSP IP/OBS SF/LOW 40: CPT | Performed by: OTOLARYNGOLOGY

## 2024-03-28 PROCEDURE — 82330 ASSAY OF CALCIUM: CPT

## 2024-03-28 PROCEDURE — 2500000004 HC RX 250 GENERAL PHARMACY W/ HCPCS (ALT 636 FOR OP/ED)

## 2024-03-28 PROCEDURE — 86901 BLOOD TYPING SEROLOGIC RH(D): CPT

## 2024-03-28 PROCEDURE — 85384 FIBRINOGEN ACTIVITY: CPT

## 2024-03-28 PROCEDURE — 2500000004 HC RX 250 GENERAL PHARMACY W/ HCPCS (ALT 636 FOR OP/ED): Performed by: PHYSICIAN ASSISTANT

## 2024-03-28 PROCEDURE — 2020000001 HC ICU ROOM DAILY

## 2024-03-28 PROCEDURE — 90945 DIALYSIS ONE EVALUATION: CPT | Performed by: INTERNAL MEDICINE

## 2024-03-28 PROCEDURE — 94003 VENT MGMT INPAT SUBQ DAY: CPT

## 2024-03-28 PROCEDURE — 71045 X-RAY EXAM CHEST 1 VIEW: CPT

## 2024-03-28 PROCEDURE — 97168 OT RE-EVAL EST PLAN CARE: CPT | Mod: GO

## 2024-03-28 PROCEDURE — 99291 CRITICAL CARE FIRST HOUR: CPT

## 2024-03-28 PROCEDURE — 99232 SBSQ HOSP IP/OBS MODERATE 35: CPT

## 2024-03-28 PROCEDURE — 85610 PROTHROMBIN TIME: CPT | Performed by: NURSE PRACTITIONER

## 2024-03-28 PROCEDURE — 82728 ASSAY OF FERRITIN: CPT | Performed by: INTERNAL MEDICINE

## 2024-03-28 PROCEDURE — 95715 VEEG EA 12-26HR INTMT MNTR: CPT

## 2024-03-28 PROCEDURE — 95720 EEG PHY/QHP EA INCR W/VEEG: CPT | Performed by: STUDENT IN AN ORGANIZED HEALTH CARE EDUCATION/TRAINING PROGRAM

## 2024-03-28 PROCEDURE — 37799 UNLISTED PX VASCULAR SURGERY: CPT

## 2024-03-28 PROCEDURE — 2500000004 HC RX 250 GENERAL PHARMACY W/ HCPCS (ALT 636 FOR OP/ED): Performed by: STUDENT IN AN ORGANIZED HEALTH CARE EDUCATION/TRAINING PROGRAM

## 2024-03-28 PROCEDURE — 82947 ASSAY GLUCOSE BLOOD QUANT: CPT

## 2024-03-28 PROCEDURE — 95700 EEG CONT REC W/VID EEG TECH: CPT

## 2024-03-28 PROCEDURE — 85610 PROTHROMBIN TIME: CPT

## 2024-03-28 PROCEDURE — 2500000002 HC RX 250 W HCPCS SELF ADMINISTERED DRUGS (ALT 637 FOR MEDICARE OP, ALT 636 FOR OP/ED)

## 2024-03-28 PROCEDURE — 85027 COMPLETE CBC AUTOMATED: CPT | Performed by: NURSE PRACTITIONER

## 2024-03-28 PROCEDURE — 85384 FIBRINOGEN ACTIVITY: CPT | Performed by: NURSE PRACTITIONER

## 2024-03-28 PROCEDURE — 85520 HEPARIN ASSAY: CPT

## 2024-03-28 PROCEDURE — 2500000005 HC RX 250 GENERAL PHARMACY W/O HCPCS

## 2024-03-28 PROCEDURE — 71045 X-RAY EXAM CHEST 1 VIEW: CPT | Performed by: RADIOLOGY

## 2024-03-28 PROCEDURE — 83735 ASSAY OF MAGNESIUM: CPT | Performed by: NURSE PRACTITIONER

## 2024-03-28 PROCEDURE — 82040 ASSAY OF SERUM ALBUMIN: CPT

## 2024-03-28 PROCEDURE — 37799 UNLISTED PX VASCULAR SURGERY: CPT | Performed by: NURSE PRACTITIONER

## 2024-03-28 PROCEDURE — 90937 HEMODIALYSIS REPEATED EVAL: CPT

## 2024-03-28 PROCEDURE — C9113 INJ PANTOPRAZOLE SODIUM, VIA: HCPCS | Performed by: PHYSICIAN ASSISTANT

## 2024-03-28 PROCEDURE — 99291 CRITICAL CARE FIRST HOUR: CPT | Performed by: STUDENT IN AN ORGANIZED HEALTH CARE EDUCATION/TRAINING PROGRAM

## 2024-03-28 PROCEDURE — 94645 CONT INHLJ TX EACH ADDL HOUR: CPT

## 2024-03-28 PROCEDURE — 2500000004 HC RX 250 GENERAL PHARMACY W/ HCPCS (ALT 636 FOR OP/ED): Mod: JZ | Performed by: NURSE PRACTITIONER

## 2024-03-28 RX ORDER — INSULIN LISPRO 100 [IU]/ML
0-15 INJECTION, SOLUTION INTRAVENOUS; SUBCUTANEOUS EVERY 4 HOURS
Status: DISCONTINUED | OUTPATIENT
Start: 2024-03-28 | End: 2024-03-28

## 2024-03-28 RX ORDER — ESOMEPRAZOLE MAGNESIUM 40 MG/1
40 GRANULE, DELAYED RELEASE ORAL
Status: DISCONTINUED | OUTPATIENT
Start: 2024-03-29 | End: 2024-04-04

## 2024-03-28 RX ORDER — THIAMINE HYDROCHLORIDE 100 MG/ML
100 INJECTION, SOLUTION INTRAMUSCULAR; INTRAVENOUS DAILY
Status: COMPLETED | OUTPATIENT
Start: 2024-03-28 | End: 2024-04-03

## 2024-03-28 RX ADMIN — INSULIN LISPRO 1 UNITS: 100 INJECTION, SOLUTION INTRAVENOUS; SUBCUTANEOUS at 03:11

## 2024-03-28 RX ADMIN — CALCIUM CHLORIDE, MAGNESIUM CHLORIDE, DEXTROSE MONOHYDRATE, LACTIC ACID, SODIUM CHLORIDE, SODIUM BICARBONATE AND POTASSIUM CHLORIDE 2400 ML/HR: 3.68; 3.05; 22; 5.4; 6.46; 3.09; .314 INJECTION INTRAVENOUS at 20:40

## 2024-03-28 RX ADMIN — THIAMINE HYDROCHLORIDE 100 MG: 100 INJECTION, SOLUTION INTRAMUSCULAR; INTRAVENOUS at 10:55

## 2024-03-28 RX ADMIN — CALCIUM GLUCONATE 1 G: 20 INJECTION, SOLUTION INTRAVENOUS at 14:36

## 2024-03-28 RX ADMIN — Medication 0.02 MCG/KG/MIN: at 03:55

## 2024-03-28 RX ADMIN — Medication 40 PERCENT: at 08:00

## 2024-03-28 RX ADMIN — CALCIUM CHLORIDE, MAGNESIUM CHLORIDE, DEXTROSE MONOHYDRATE, LACTIC ACID, SODIUM CHLORIDE, SODIUM BICARBONATE AND POTASSIUM CHLORIDE 2400 ML/HR: 3.68; 3.05; 22; 5.4; 6.46; 3.09; .314 INJECTION INTRAVENOUS at 01:10

## 2024-03-28 RX ADMIN — DEXAMETHASONE SODIUM PHOSPHATE 13.2 MG: 4 INJECTION, SOLUTION INTRA-ARTICULAR; INTRALESIONAL; INTRAMUSCULAR; INTRAVENOUS; SOFT TISSUE at 20:24

## 2024-03-28 RX ADMIN — INSULIN LISPRO 3 UNITS: 100 INJECTION, SOLUTION INTRAVENOUS; SUBCUTANEOUS at 13:44

## 2024-03-28 RX ADMIN — CALCIUM GLUCONATE 1 G: 20 INJECTION, SOLUTION INTRAVENOUS at 09:27

## 2024-03-28 RX ADMIN — INSULIN LISPRO 1 UNITS: 100 INJECTION, SOLUTION INTRAVENOUS; SUBCUTANEOUS at 08:57

## 2024-03-28 RX ADMIN — PANTOPRAZOLE SODIUM 40 MG: 40 INJECTION, POWDER, FOR SOLUTION INTRAVENOUS at 08:57

## 2024-03-28 RX ADMIN — INSULIN HUMAN 10 UNITS/HR: 1 INJECTION, SOLUTION INTRAVENOUS at 20:27

## 2024-03-28 RX ADMIN — HEPARIN SODIUM 1600 UNITS/HR: 10000 INJECTION, SOLUTION INTRAVENOUS at 10:09

## 2024-03-28 RX ADMIN — INSULIN LISPRO 6 UNITS: 100 INJECTION, SOLUTION INTRAVENOUS; SUBCUTANEOUS at 18:49

## 2024-03-28 RX ADMIN — SODIUM CHLORIDE 0.03 MCG/KG/MIN: 9 INJECTION, SOLUTION INTRAVENOUS at 11:38

## 2024-03-28 RX ADMIN — HYDROCORTISONE SODIUM SUCCINATE 50 MG: 100 INJECTION, POWDER, FOR SOLUTION INTRAMUSCULAR; INTRAVENOUS at 08:57

## 2024-03-28 ASSESSMENT — PAIN SCALES - GENERAL
PAINLEVEL_OUTOF10: 0 - NO PAIN

## 2024-03-28 ASSESSMENT — PAIN - FUNCTIONAL ASSESSMENT
PAIN_FUNCTIONAL_ASSESSMENT: CPOT (CRITICAL CARE PAIN OBSERVATION TOOL)

## 2024-03-28 ASSESSMENT — COGNITIVE AND FUNCTIONAL STATUS - GENERAL
DRESSING REGULAR LOWER BODY CLOTHING: TOTAL
TOILETING: TOTAL
DRESSING REGULAR UPPER BODY CLOTHING: TOTAL
HELP NEEDED FOR BATHING: TOTAL
PERSONAL GROOMING: TOTAL
DAILY ACTIVITIY SCORE: 6
EATING MEALS: TOTAL

## 2024-03-28 ASSESSMENT — ACTIVITIES OF DAILY LIVING (ADL): BATHING_ASSISTANCE: TOTAL

## 2024-03-28 NOTE — PROGRESS NOTES
"   Department of Plastic and Reconstructive Surgery  Daily Progress Note    Patient Name: Jason Hollins  MRN: [id number]  Date:  03/28/24     Subjective  Found resting in bed critically ill, intubated, no longer sedated in the CTICU. Now following commands and waking up more. Ventilator support with FiO2 remaining at 40% and CRRT running    Overnight Events  Levo resumed last evening. Received 1U PRBC after  dialysis filter had clotted, and unable to return blood.     Objective    Vital Signs  BP (!) 90/46   Pulse 83   Temp 36.3 °C (97.3 °F) (Esophageal)   Resp 25   Ht 1.778 m (5' 10\")   Wt 133 kg (292 lb 8.8 oz)   SpO2 98%   BMI 41.98 kg/m²      Physical Exam   Constitutional: Intubated, sedation weaned off. Able to squeeze hands on command, opens eyes to verbal stimuli  ENMT: MMM, ETT in place, OG tube in place  Cardiovascular: RRR on telemetry monitor  Respiratory/Thorax: ETT in place with ventilator support 40% FiO2  Gastrointestinal: abdomen soft, non distended  Genitourinary: CVVHD currently running   Musculoskeletal: able to squeeze hands, purposeful movement appreciated  Extremities: Generalized edema, Right 4Fr CFA and 7Fr CFV sheath in place  RLE: right thigh edematous, large, no bruising noted, soft and compressible, incisional wound vac in place, holding suction at -75 mmHG, no leak or alarm present, ANDERSON drain x 3 with dark serous output  BLE neurovascular intact, cap refill < 2 sec, +df/pf, DP/PT/ radial pulses 2+, no drainage noted.   Neurological:  HANNAH. intubated   Psychological: HANNAH, intubated  Skin: Warm, dry, intact    Diagnostics   Results for orders placed or performed during the hospital encounter of 03/05/24 (from the past 24 hour(s))   POCT GLUCOSE   Result Value Ref Range    POCT Glucose 159 (H) 74 - 99 mg/dL   Vitamin B12   Result Value Ref Range    Vitamin B12 >2,000 (H) 211 - 911 pg/mL   Ammonia   Result Value Ref Range    Ammonia 50 16 - 53 umol/L   TSH with reflex to Free T4 if abnormal "   Result Value Ref Range    Thyroid Stimulating Hormone 0.82 0.44 - 3.98 mIU/L   Blood Gas Arterial Full Panel   Result Value Ref Range    POCT pH, Arterial 7.48 (H) 7.38 - 7.42 pH    POCT pCO2, Arterial 26 (L) 38 - 42 mm Hg    POCT pO2, Arterial 82 (L) 85 - 95 mm Hg    POCT SO2, Arterial 98 94 - 100 %    POCT Oxy Hemoglobin, Arterial 96.1 94.0 - 98.0 %    POCT Hematocrit Calculated, Arterial 23.0 (L) 41.0 - 52.0 %    POCT Sodium, Arterial 134 (L) 136 - 145 mmol/L    POCT Potassium, Arterial 3.9 3.5 - 5.3 mmol/L    POCT Chloride, Arterial 101 98 - 107 mmol/L    POCT Ionized Calcium, Arterial 1.09 (L) 1.10 - 1.33 mmol/L    POCT Glucose, Arterial 129 (H) 74 - 99 mg/dL    POCT Lactate, Arterial 2.5 (H) 0.4 - 2.0 mmol/L    POCT Base Excess, Arterial -3.5 (L) -2.0 - 3.0 mmol/L    POCT HCO3 Calculated, Arterial 19.4 (L) 22.0 - 26.0 mmol/L    POCT Hemoglobin, Arterial 7.8 (L) 13.5 - 17.5 g/dL    POCT Anion Gap, Arterial 18 10 - 25 mmo/L    Patient Temperature 37.0 degrees Celsius    FiO2 40 %   CBC   Result Value Ref Range    WBC 14.9 (H) 4.4 - 11.3 x10*3/uL    nRBC 4.7 (H) 0.0 - 0.0 /100 WBCs    RBC 2.68 (L) 4.50 - 5.90 x10*6/uL    Hemoglobin 7.7 (L) 13.5 - 17.5 g/dL    Hematocrit 21.9 (L) 41.0 - 52.0 %    MCV 82 80 - 100 fL    MCH 28.7 26.0 - 34.0 pg    MCHC 35.2 32.0 - 36.0 g/dL    RDW 15.7 (H) 11.5 - 14.5 %    Platelets 47 (L) 150 - 450 x10*3/uL   Magnesium   Result Value Ref Range    Magnesium 2.43 (H) 1.60 - 2.40 mg/dL   Calcium, ionized   Result Value Ref Range    POCT Calcium, Ionized 1.08 (L) 1.1 - 1.33 mmol/L   Renal Function Panel   Result Value Ref Range    Glucose 141 (H) 74 - 99 mg/dL    Sodium 136 136 - 145 mmol/L    Potassium 4.1 3.5 - 5.3 mmol/L    Chloride 100 98 - 107 mmol/L    Bicarbonate 21 21 - 32 mmol/L    Anion Gap 19 10 - 20 mmol/L    Urea Nitrogen 33 (H) 6 - 23 mg/dL    Creatinine 2.04 (H) 0.50 - 1.30 mg/dL    eGFR 36 (L) >60 mL/min/1.73m*2    Calcium 8.4 (L) 8.6 - 10.6 mg/dL    Phosphorus 1.8 (L)  2.5 - 4.9 mg/dL    Albumin 3.5 3.4 - 5.0 g/dL   SST TOP   Result Value Ref Range    Extra Tube Hold for add-ons.    POCT GLUCOSE   Result Value Ref Range    POCT Glucose 147 (H) 74 - 99 mg/dL   CBC   Result Value Ref Range    WBC 14.7 (H) 4.4 - 11.3 x10*3/uL    nRBC 5.9 (H) 0.0 - 0.0 /100 WBCs    RBC 2.70 (L) 4.50 - 5.90 x10*6/uL    Hemoglobin 7.7 (L) 13.5 - 17.5 g/dL    Hematocrit 23.6 (L) 41.0 - 52.0 %    MCV 87 80 - 100 fL    MCH 28.5 26.0 - 34.0 pg    MCHC 32.6 32.0 - 36.0 g/dL    RDW 16.7 (H) 11.5 - 14.5 %    Platelets 47 (L) 150 - 450 x10*3/uL   Coagulation Screen   Result Value Ref Range    Protime 17.9 (H) 9.8 - 12.8 seconds    INR 1.6 (H) 0.9 - 1.1    aPTT 63 (H) 27 - 38 seconds   Fibrinogen   Result Value Ref Range    Fibrinogen 180 (L) 200 - 400 mg/dL   Blood Gas Arterial Full Panel   Result Value Ref Range    POCT pH, Arterial 7.49 (H) 7.38 - 7.42 pH    POCT pCO2, Arterial 27 (L) 38 - 42 mm Hg    POCT pO2, Arterial 74 (L) 85 - 95 mm Hg    POCT SO2, Arterial 99 94 - 100 %    POCT Oxy Hemoglobin, Arterial 95.0 94.0 - 98.0 %    POCT Hematocrit Calculated, Arterial 24.0 (L) 41.0 - 52.0 %    POCT Sodium, Arterial 133 (L) 136 - 145 mmol/L    POCT Potassium, Arterial 4.1 3.5 - 5.3 mmol/L    POCT Chloride, Arterial 102 98 - 107 mmol/L    POCT Ionized Calcium, Arterial 1.11 1.10 - 1.33 mmol/L    POCT Glucose, Arterial 146 (H) 74 - 99 mg/dL    POCT Lactate, Arterial 3.2 (H) 0.4 - 2.0 mmol/L    POCT Base Excess, Arterial -2.2 (L) -2.0 - 3.0 mmol/L    POCT HCO3 Calculated, Arterial 20.6 (L) 22.0 - 26.0 mmol/L    POCT Hemoglobin, Arterial 8.1 (L) 13.5 - 17.5 g/dL    POCT Anion Gap, Arterial 15 10 - 25 mmo/L    Patient Temperature 37.0 degrees Celsius    FiO2 40 %   Blood Gas Lactic Acid, Venous   Result Value Ref Range    POCT Lactate, Venous 3.0 (H) 0.4 - 2.0 mmol/L   Heparin Assay, UFH   Result Value Ref Range    Heparin Unfractionated 0.1 See Comment Below for Therapeutic Ranges IU/mL   CBC   Result Value Ref  Range    WBC 14.9 (H) 4.4 - 11.3 x10*3/uL    nRBC 5.4 (H) 0.0 - 0.0 /100 WBCs    RBC 2.88 (L) 4.50 - 5.90 x10*6/uL    Hemoglobin 8.6 (L) 13.5 - 17.5 g/dL    Hematocrit 23.8 (L) 41.0 - 52.0 %    MCV 83 80 - 100 fL    MCH 29.9 26.0 - 34.0 pg    MCHC 36.1 (H) 32.0 - 36.0 g/dL    RDW 15.8 (H) 11.5 - 14.5 %    Platelets 53 (L) 150 - 450 x10*3/uL   POCT GLUCOSE   Result Value Ref Range    POCT Glucose 147 (H) 74 - 99 mg/dL   POCT GLUCOSE   Result Value Ref Range    POCT Glucose 148 (H) 74 - 99 mg/dL   POCT GLUCOSE   Result Value Ref Range    POCT Glucose 142 (H) 74 - 99 mg/dL   Blood Gas Arterial Full Panel   Result Value Ref Range    POCT pH, Arterial 7.45 (H) 7.38 - 7.42 pH    POCT pCO2, Arterial 26 (L) 38 - 42 mm Hg    POCT pO2, Arterial 93 85 - 95 mm Hg    POCT SO2, Arterial 98 94 - 100 %    POCT Oxy Hemoglobin, Arterial 95.9 94.0 - 98.0 %    POCT Hematocrit Calculated, Arterial 26.0 (L) 41.0 - 52.0 %    POCT Sodium, Arterial 134 (L) 136 - 145 mmol/L    POCT Potassium, Arterial 4.1 3.5 - 5.3 mmol/L    POCT Chloride, Arterial 103 98 - 107 mmol/L    POCT Ionized Calcium, Arterial 1.03 (L) 1.10 - 1.33 mmol/L    POCT Glucose, Arterial 173 (H) 74 - 99 mg/dL    POCT Lactate, Arterial 2.8 (H) 0.4 - 2.0 mmol/L    POCT Base Excess, Arterial -5.0 (L) -2.0 - 3.0 mmol/L    POCT HCO3 Calculated, Arterial 18.1 (L) 22.0 - 26.0 mmol/L    POCT Hemoglobin, Arterial 8.7 (L) 13.5 - 17.5 g/dL    POCT Anion Gap, Arterial 17 10 - 25 mmo/L    Patient Temperature 37.0 degrees Celsius    FiO2 40 %   CBC   Result Value Ref Range    WBC 15.6 (H) 4.4 - 11.3 x10*3/uL    nRBC 4.0 (H) 0.0 - 0.0 /100 WBCs    RBC 2.92 (L) 4.50 - 5.90 x10*6/uL    Hemoglobin 8.7 (L) 13.5 - 17.5 g/dL    Hematocrit 23.9 (L) 41.0 - 52.0 %    MCV 82 80 - 100 fL    MCH 29.8 26.0 - 34.0 pg    MCHC 36.4 (H) 32.0 - 36.0 g/dL    RDW 15.4 (H) 11.5 - 14.5 %    Platelets 47 (L) 150 - 450 x10*3/uL   Heparin Assay, UFH   Result Value Ref Range    Heparin Unfractionated 0.2 See  Comment Below for Therapeutic Ranges IU/mL   Magnesium   Result Value Ref Range    Magnesium 2.52 (H) 1.60 - 2.40 mg/dL   Renal Function Panel   Result Value Ref Range    Glucose 181 (H) 74 - 99 mg/dL    Sodium 135 (L) 136 - 145 mmol/L    Potassium 4.2 3.5 - 5.3 mmol/L    Chloride 100 98 - 107 mmol/L    Bicarbonate 21 21 - 32 mmol/L    Anion Gap 18 10 - 20 mmol/L    Urea Nitrogen 39 (H) 6 - 23 mg/dL    Creatinine 2.14 (H) 0.50 - 1.30 mg/dL    eGFR 34 (L) >60 mL/min/1.73m*2    Calcium 8.3 (L) 8.6 - 10.6 mg/dL    Phosphorus 2.7 2.5 - 4.9 mg/dL    Albumin 3.4 3.4 - 5.0 g/dL   Hepatic function panel   Result Value Ref Range    Albumin 3.5 3.4 - 5.0 g/dL    Bilirubin, Total 14.6 (H) 0.0 - 1.2 mg/dL    Bilirubin, Direct 8.5 (H) 0.0 - 0.3 mg/dL    Alkaline Phosphatase 124 33 - 136 U/L     (H) 10 - 52 U/L     (H) 9 - 39 U/L    Total Protein 5.2 (L) 6.4 - 8.2 g/dL   POCT GLUCOSE   Result Value Ref Range    POCT Glucose 185 (H) 74 - 99 mg/dL   Blood Gas Arterial Full Panel   Result Value Ref Range    POCT pH, Arterial 7.49 (H) 7.38 - 7.42 pH    POCT pCO2, Arterial 26 (L) 38 - 42 mm Hg    POCT pO2, Arterial 123 (H) 85 - 95 mm Hg    POCT SO2, Arterial 100 94 - 100 %    POCT Oxy Hemoglobin, Arterial 96.9 94.0 - 98.0 %    POCT Hematocrit Calculated, Arterial 27.0 (L) 41.0 - 52.0 %    POCT Sodium, Arterial 132 (L) 136 - 145 mmol/L    POCT Potassium, Arterial 4.3 3.5 - 5.3 mmol/L    POCT Chloride, Arterial 103 98 - 107 mmol/L    POCT Ionized Calcium, Arterial 1.07 (L) 1.10 - 1.33 mmol/L    POCT Glucose, Arterial 200 (H) 74 - 99 mg/dL    POCT Lactate, Arterial 2.6 (H) 0.4 - 2.0 mmol/L    POCT Base Excess, Arterial -2.8 (L) -2.0 - 3.0 mmol/L    POCT HCO3 Calculated, Arterial 19.8 (L) 22.0 - 26.0 mmol/L    POCT Hemoglobin, Arterial 8.9 (L) 13.5 - 17.5 g/dL    POCT Anion Gap, Arterial 14 10 - 25 mmo/L    Patient Temperature 37.0 degrees Celsius    FiO2 40 %   Coagulation Screen   Result Value Ref Range    Protime 17.5 (H)  9.8 - 12.8 seconds    INR 1.5 (H) 0.9 - 1.1    aPTT 105 (HH) 27 - 38 seconds   Fibrinogen   Result Value Ref Range    Fibrinogen 171 (L) 200 - 400 mg/dL   CALCIUM, IONIZED   Result Value Ref Range    POCT Calcium, Ionized 1.03 (L) 1.1 - 1.33 mmol/L   Heparin Assay, UFH   Result Value Ref Range    Heparin Unfractionated 0.3 See Comment Below for Therapeutic Ranges IU/mL     Current Medications  Scheduled medications  [START ON 3/29/2024] esomeprazole, 40 mg, nasogastric tube, Daily before breakfast  hydrocortisone sodium succinate, 50 mg, intravenous, q12h  insulin lispro, 0-5 Units, subcutaneous, q4h  oxygen, , inhalation, Continuous - Inhalation  thiamine, 100 mg, intravenous, Daily      Continuous medications  epoprostenol, 0.01 mcg/kg/min (Ideal), Last Rate: 0.01 mcg/kg/min (03/28/24 0732)  heparin, 0-4,000 Units/hr, Last Rate: 1,600 Units/hr (03/28/24 1009)  lactated Ringer's, 5 mL/hr, Last Rate: 5 mL/hr (03/28/24 0600)  norepinephrine, 0.01-3 mcg/kg/min (Dosing Weight), Last Rate: 0.03 mcg/kg/min (03/28/24 0800)  PrismaSol 4/2.5, 2,400 mL/hr, Last Rate: 2,400 mL/hr (03/28/24 0110)      PRN medications  PRN medications: alteplase, calcium gluconate, calcium gluconate, dextrose **OR** glucagon, magnesium sulfate, magnesium sulfate     Assessment  Jason Hollins 63 y.o. 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 Hawkins and Dr. Smith on 3/5/24.  Post operative course was uncomplicated and patient was on RNF until 3/20 for prolonged bed rest to avoid hip flexion to maintain flap integrity.  Patient was on prophylactic lovenox while on bedrest.    3/20: Now s/p CPR with ROSC after TPA for assumed large PE, overnight started on heparin, epo, increased pressor requirements, increased swelling at flap site.   3/21: MTP  transfusions. Return to OR for exploration of R Flap site now s/p Exploration of right thigh wound evacuation of hematoma. Suture ligation of multiple bleeding vessel and several points in gluteal area with Dr. Smith.  3/26: 3 drains in place with continued output but output is slowly downtrending, wound vac remains with no output       Plan/Recommendations  S/p Exploration of right thigh wound evacuation of hematoma 3/21. Suture ligation of multiple bleeding vessel and several points in gluteal area:  A/P:   - Remains critically ill in CTICU       ·  remains intubated, without sedation       ·  Levophed resumed PM 3/27       ·  CRRT       ·  Heparin gtt resumed 3/27 after HIIT testing negative  - Maintain incisional wound vac to right gluteal/thigh area x10-14 days post-op (3/30-4/3)       ·  Settings: -75mmHg low continuous suction        ·  Notify plastic surgery with any concerns of leak or obstruction   - Monitor right thigh for worsening s/s of active bleeding  - Continue ANDERSON drain care per nursing      ·  Strip drain tubing TID and PRN     ·  Monitor and record output q8h      ·  Keep drain sites C/D/I with daily drain dressing changes      ·  Notify plastics if ANDERSON drain output exceeds > 100 ml/hr in one drain or > 300 ml/hr collectively  - Operative findings 3/21: Actively Bleeding Gluteal Arterial Perforators, Generalized Oozing, healthy Flap, EBL 6.5L (3L of which was retained Hematoma)   - Continue Abx per ICU  - Appreciate remaining care per CTICU/SICU  - Plastics to follow     Patient and plan discussed with Dr. Edwards.        PHILLIP Dalal-CNP   Plastic and Reconstructive Surgery   [place]  Pager #[phone]  Team phones: [phone]

## 2024-03-28 NOTE — PROGRESS NOTES
"Jason Hollins is a 63 y.o. male on day 23 of admission presenting with Soft tissue sarcoma (CMS/HCC).    Subjective   Followed commands overnight.        Objective     Physical Exam    Last Recorded Vitals  Blood pressure (!) 90/46, pulse 82, temperature 36.3 °C (97.3 °F), temperature source Esophageal, resp. rate 25, height 1.778 m (5' 10\"), weight 133 kg (292 lb 8.8 oz), SpO2 97 %.  Intake/Output last 3 Shifts:  I/O last 3 completed shifts:  In: 1471.1 (11.1 mL/kg) [I.V.:531.1 (4 mL/kg); Blood:350; NG/GT:140; IV Piggyback:450]  Out: 5790 (43.6 mL/kg) [Drains:240; Other:5550]  Weight: 132.7 kg     Relevant Results           This patient currently has cardiac telemetry ordered; if you would like to modify or discontinue the telemetry order, click here to go to the orders activity to modify/discontinue the order.  This patient has a central line   Reason for the central line remaining today? Dialysis/Hemapheresis and Parenteral medication      This patient is intubated   Reason for patient to remain intubated today? they have inadequate gas-exchange without positive pressure        Malnutrition Diagnosis Status: New  Malnutrition Diagnosis: Moderate malnutrition related to acute disease or injury  As Evidenced by: <50% of estimated energy requirements for >5days (no intake x 6 days), +2 edema, current critically ill state.  I agree with the dietitian's malnutrition diagnosis.      Assessment/Plan   Principal Problem:    Soft tissue sarcoma (CMS/HCC)  Active Problems:    Extraskeletal myxoid chondrosarcoma (CMS/HCC)    Cardiopulmonary arrest (CMS/HCC)    63 y.o. 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 Hawkins and Dr. Smith on 3/5/24.  Post operative course was uncomplicated and patient was on RNF until 3/20 for prolonged " bed rest to avoid hip flexion to maintain flap integrity.  Patient was on prophylactic lovenox while on bedrest. Per nursing report, patient was getting to the side of the bed today to get up and walk when he had sudden chest heaviness. Pre-cardiac arrest EKG with signs of anterior ischemia. Nursing was called by family because patient went unresponsive.  ACLS was immediately started. ROSC was achieved after 1 round of CPR, however patient quickly decompensated into PEA.  Airway was secured.  Multiple rounds of CPR were completed with intermittent ROSC prior to transport to CTICU.  Patient arrived to CTICU under SICU care with active CPR underway.  Primary concern for STEMI vs massive PE.  Bedside POCUS TTE with biventricular failure and dilated RV.  RV septal bowing. Decided to give TPA to treat massive PE.  After several minutes of CPR patient achieved  sustained ROSC. Subsequently taken to cath lab where angiography illustrated clean coronaries and no significant PE.      3/20: Now s/p CPR with ROSC after TPA, overnight started on heparin, epo, increased pressor requirements, increased swelling at flap site.   3/21: MTP transfusions. Return to OR for exploration of R Flap site. High pressors. On epo.    3/22 Initiated on CVVH for profound metabolic acidosis, repeat ECHO with hyperdynamic LV, normal RV function, NMB for hypoxemic respiratory failure, CT scans with pancreatitis.      I evaluated the patient with an advanced practice provider. I oversaw this patient's care, and I participated actively their clinical course.     Plan:  Neuro: Off sedation. Following commands this morning, though weakly. Encephalopathy improving. Repeat head CT 3/26 without significant findings. Awaiting formal read on EEG. Will remove if no seizure activity noted.   Cardiovascular: Undifferentiated shock, resolved. Maintain invasive access; continue to support MAP >65mmHg. Will repeat ECHO off inotropic support. Continue stress dose  steroids for possible adrenal insufficiency; however will continue to wean. Low concern for HLH from hematology consult.   Respiratory: Acute hypoxemic respiratory failure, likely multifactorial in the setting of hypervolemia. FiO2 down to 40%, PEEP 12. Continue slow iepo wean today. Infiltrates on CT. ENT consult. Anticipate patient will be medically ready for trach in the next 24-48 hours. Family agreeable.   GI: NPO; PPI. Nutrition consult. Increase TF as tolerated today. AST/ALT downtrending, slight uptrend in bilirubin. Continue to monitor. CT head, chest, abdomen, and pelvis with worsened pancreatitis. No other acute findings.    : Anuric RUTH; started on RRT for persistent metabolic acidosis. Improving. Increase volume removal for hypervolemia, goal negative 2L.  Endo: Hx DM2. Continue insulin infusion per protocol.   Heme: acute blood loss anemia from surgical limb; concern for recent PE and DVT, had paroxysmal atrial fibrillation, currently in NSR. Thrombocytopenia today. PF4 negative. Continue heparin infusion. Vascular medicine following. Maintain active T&S.  ID: Completed a 7 day course of empiric antibiotics and mupirocin x 5 days for MRSA swab positive. Currently off antibiotics.     Dispo: SICU Care; family members and primary surgeon updated. Supportive onc following.      Due to the high probability of life threatening clinical decompensation, the patient required critical care time evaluating and managing this patient.  Critical care time included obtaining a history, examining the patient, ordering and reviewing studies, discussing, developing, and implementing a management plan, evaluating the patient's response to treatment, and discussion with other care team providers. I saw and evaluated the patient myself. I reviewed the provider's documentation and discussed the patient with the provider. Critical care time was performed exclusive of billable procedures.     Critical Care Time: 40  minutes       I spent 40 minutes in the professional and overall care of this patient.      Yamilex Rouse MD

## 2024-03-28 NOTE — CARE PLAN
Brief Hematology Note    Patient with slow clinical improvement. 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 As IL2R of this level suggests a severe inflammatory response, it would not be unreasonable to treat empirically with 10mg/m2 of dexamethasone daily in divided doses (12.8mg BID).     Discussed with Dr. Blackwell. We will continue to follow.    Kory Baltazar MD  Hematology Oncology PGYV

## 2024-03-28 NOTE — PROGRESS NOTES
"03/28 REVIEW   Continues requiring ICU loc - still intubated (7d), on CVVH, Palliative Oncology consulted 3/24 for family support. 3/25 rt. Soleal DVT (C: Vasc Sx) Not clear if dc plan will remain for [facility], [facility] or [facility] in which case need to re-connect with closer to dc and subsequently need [employer] auth.      03/25 REVIEW  Continues requiring ICU loc - still intubated (5d), on CVVH, and now Palliative Oncology consulted 3/24 for family support. Not clear if dc plan will remain for [facility], [facility] or [facility].          03/22 1045  Services for patient keep changing now entirely on Plastics (CTICU) and per today's Plastic Surgery -- \"Remains critically ill in CTICU,  CRRT initiated overnight. Remains intubated/sedated with levo, vaso, epi, angiotensin II running Overnight Events Dialysis line placed, CVVHD initiated. Reviewed above notes copied to this progress note.... +added updated clinicals in [facility] and provided SNFs - [facility] update. To continue to follow and if SNF still dc plan, obtain foc from patient/family when appropritate.      "

## 2024-03-28 NOTE — PROGRESS NOTES
Jason Hollins is a 63 y.o. male on day 23 of admission presenting with Soft tissue sarcoma (CMS/HCC).    Subjective   Net negative 2.5L for past 24hrs. Remains on CVVH. Resumed levo and transfused 1u PRBC yesterday evening. Remains intubated off sedation. Neuro exam now improving. Patient opening eyes to verbal stimulation and following simple commands. Weak bilateral hand grasps and wiggles toes in left foot. Unable to move RLE.     Objective     Physical Exam  Vitals reviewed.   Constitutional:       Appearance: He is ill-appearing.      Interventions: He is sedated, intubated and restrained.      Comments: Intubated. Unable to arouse.   HENT:      Head:      Comments: Edematous face/head/neck     Mouth/Throat:      Mouth: Mucous membranes are moist.      Comments: ETT in place. OG tube present to LISAE, bloody drainage.  Eyes:      General: Scleral icterus present.      Pupils: Pupils are equal, round, and reactive to light.      Comments: Conjunctival edema. Subconjunctival hemorrhage on left.   Cardiovascular:      Rate and Rhythm: Normal rate and regular rhythm.      Pulses:           Radial pulses are 1+ on the right side and 1+ on the left side.        Dorsalis pedis pulses are 1+ on the right side and 1+ on the left side.      Heart sounds: Normal heart sounds.   Pulmonary:      Effort: He is intubated.      Comments: Mechanically ventilated via ETT. Diminished breath sounds bilaterally. Equal chest rise. Current vent settings AC/VC 40%, 550, 24, +12.  Abdominal:      General: Abdomen is protuberant. Bowel sounds are absent. There is distension.      Palpations: Abdomen is soft.      Comments: Obese abdomen.   Genitourinary:     Comments: Penile/scrotal edema.  Skin:     General: Skin is warm and dry.      Comments: Anasarca. Right flap site with wound VAC, no output. ANDERSON x 3 all with serosanguinous output.   Neurological:      Mental Status: He is unresponsive.      GCS: GCS eye subscore is 1. GCS verbal  "subscore is 1. GCS motor subscore is 1.      Comments: Intubated. +cough and gag. Patient opening eyes to verbal stimulation and following simple commands. Weak bilateral hand grasps and wiggles toes in left foot. Unable to move RLE.        Last Recorded Vitals  Blood pressure (!) 90/46, pulse 82, temperature 36.3 °C (97.3 °F), temperature source Esophageal, resp. rate 25, height 1.778 m (5' 10\"), weight 133 kg (292 lb 8.8 oz), SpO2 97 %.    VS over last 24h  Heart Rate:  [74-97]   Temp:  [36 °C (96.8 °F)-37.4 °C (99.3 °F)]   Resp:  [10-36]   BP: (90)/(46)   Weight:  [133 kg (292 lb 8.8 oz)]   SpO2:  [91 %-100 %]    Intake/Output last 3 Shifts:  I/O last 3 completed shifts:  In: 1471.1 (11.1 mL/kg) [I.V.:531.1 (4 mL/kg); Blood:350; NG/GT:140; IV Piggyback:450]  Out: 5790 (43.6 mL/kg) [Drains:240; Other:5550]  Weight: 132.7 kg       Intake/Output Summary (Last 24 hours) at 3/28/2024 1216  Last data filed at 3/28/2024 1100  Gross per 24 hour   Intake 1056.18 ml   Output 3249 ml   Net -2192.82 ml       Relevant Results  Results for orders placed or performed during the hospital encounter of 03/05/24 (from the past 24 hour(s))   Vitamin B12   Result Value Ref Range    Vitamin B12 >2,000 (H) 211 - 911 pg/mL   Ammonia   Result Value Ref Range    Ammonia 50 16 - 53 umol/L   TSH with reflex to Free T4 if abnormal   Result Value Ref Range    Thyroid Stimulating Hormone 0.82 0.44 - 3.98 mIU/L   Blood Gas Arterial Full Panel   Result Value Ref Range    POCT pH, Arterial 7.48 (H) 7.38 - 7.42 pH    POCT pCO2, Arterial 26 (L) 38 - 42 mm Hg    POCT pO2, Arterial 82 (L) 85 - 95 mm Hg    POCT SO2, Arterial 98 94 - 100 %    POCT Oxy Hemoglobin, Arterial 96.1 94.0 - 98.0 %    POCT Hematocrit Calculated, Arterial 23.0 (L) 41.0 - 52.0 %    POCT Sodium, Arterial 134 (L) 136 - 145 mmol/L    POCT Potassium, Arterial 3.9 3.5 - 5.3 mmol/L    POCT Chloride, Arterial 101 98 - 107 mmol/L    POCT Ionized Calcium, Arterial 1.09 (L) 1.10 - 1.33 " mmol/L    POCT Glucose, Arterial 129 (H) 74 - 99 mg/dL    POCT Lactate, Arterial 2.5 (H) 0.4 - 2.0 mmol/L    POCT Base Excess, Arterial -3.5 (L) -2.0 - 3.0 mmol/L    POCT HCO3 Calculated, Arterial 19.4 (L) 22.0 - 26.0 mmol/L    POCT Hemoglobin, Arterial 7.8 (L) 13.5 - 17.5 g/dL    POCT Anion Gap, Arterial 18 10 - 25 mmo/L    Patient Temperature 37.0 degrees Celsius    FiO2 40 %   CBC   Result Value Ref Range    WBC 14.9 (H) 4.4 - 11.3 x10*3/uL    nRBC 4.7 (H) 0.0 - 0.0 /100 WBCs    RBC 2.68 (L) 4.50 - 5.90 x10*6/uL    Hemoglobin 7.7 (L) 13.5 - 17.5 g/dL    Hematocrit 21.9 (L) 41.0 - 52.0 %    MCV 82 80 - 100 fL    MCH 28.7 26.0 - 34.0 pg    MCHC 35.2 32.0 - 36.0 g/dL    RDW 15.7 (H) 11.5 - 14.5 %    Platelets 47 (L) 150 - 450 x10*3/uL   Magnesium   Result Value Ref Range    Magnesium 2.43 (H) 1.60 - 2.40 mg/dL   Calcium, ionized   Result Value Ref Range    POCT Calcium, Ionized 1.08 (L) 1.1 - 1.33 mmol/L   Renal Function Panel   Result Value Ref Range    Glucose 141 (H) 74 - 99 mg/dL    Sodium 136 136 - 145 mmol/L    Potassium 4.1 3.5 - 5.3 mmol/L    Chloride 100 98 - 107 mmol/L    Bicarbonate 21 21 - 32 mmol/L    Anion Gap 19 10 - 20 mmol/L    Urea Nitrogen 33 (H) 6 - 23 mg/dL    Creatinine 2.04 (H) 0.50 - 1.30 mg/dL    eGFR 36 (L) >60 mL/min/1.73m*2    Calcium 8.4 (L) 8.6 - 10.6 mg/dL    Phosphorus 1.8 (L) 2.5 - 4.9 mg/dL    Albumin 3.5 3.4 - 5.0 g/dL   SST TOP   Result Value Ref Range    Extra Tube Hold for add-ons.    POCT GLUCOSE   Result Value Ref Range    POCT Glucose 147 (H) 74 - 99 mg/dL   CBC   Result Value Ref Range    WBC 14.7 (H) 4.4 - 11.3 x10*3/uL    nRBC 5.9 (H) 0.0 - 0.0 /100 WBCs    RBC 2.70 (L) 4.50 - 5.90 x10*6/uL    Hemoglobin 7.7 (L) 13.5 - 17.5 g/dL    Hematocrit 23.6 (L) 41.0 - 52.0 %    MCV 87 80 - 100 fL    MCH 28.5 26.0 - 34.0 pg    MCHC 32.6 32.0 - 36.0 g/dL    RDW 16.7 (H) 11.5 - 14.5 %    Platelets 47 (L) 150 - 450 x10*3/uL   Coagulation Screen   Result Value Ref Range    Protime 17.9  (H) 9.8 - 12.8 seconds    INR 1.6 (H) 0.9 - 1.1    aPTT 63 (H) 27 - 38 seconds   Fibrinogen   Result Value Ref Range    Fibrinogen 180 (L) 200 - 400 mg/dL   Blood Gas Arterial Full Panel   Result Value Ref Range    POCT pH, Arterial 7.49 (H) 7.38 - 7.42 pH    POCT pCO2, Arterial 27 (L) 38 - 42 mm Hg    POCT pO2, Arterial 74 (L) 85 - 95 mm Hg    POCT SO2, Arterial 99 94 - 100 %    POCT Oxy Hemoglobin, Arterial 95.0 94.0 - 98.0 %    POCT Hematocrit Calculated, Arterial 24.0 (L) 41.0 - 52.0 %    POCT Sodium, Arterial 133 (L) 136 - 145 mmol/L    POCT Potassium, Arterial 4.1 3.5 - 5.3 mmol/L    POCT Chloride, Arterial 102 98 - 107 mmol/L    POCT Ionized Calcium, Arterial 1.11 1.10 - 1.33 mmol/L    POCT Glucose, Arterial 146 (H) 74 - 99 mg/dL    POCT Lactate, Arterial 3.2 (H) 0.4 - 2.0 mmol/L    POCT Base Excess, Arterial -2.2 (L) -2.0 - 3.0 mmol/L    POCT HCO3 Calculated, Arterial 20.6 (L) 22.0 - 26.0 mmol/L    POCT Hemoglobin, Arterial 8.1 (L) 13.5 - 17.5 g/dL    POCT Anion Gap, Arterial 15 10 - 25 mmo/L    Patient Temperature 37.0 degrees Celsius    FiO2 40 %   Blood Gas Lactic Acid, Venous   Result Value Ref Range    POCT Lactate, Venous 3.0 (H) 0.4 - 2.0 mmol/L   Heparin Assay, UFH   Result Value Ref Range    Heparin Unfractionated 0.1 See Comment Below for Therapeutic Ranges IU/mL   CBC   Result Value Ref Range    WBC 14.9 (H) 4.4 - 11.3 x10*3/uL    nRBC 5.4 (H) 0.0 - 0.0 /100 WBCs    RBC 2.88 (L) 4.50 - 5.90 x10*6/uL    Hemoglobin 8.6 (L) 13.5 - 17.5 g/dL    Hematocrit 23.8 (L) 41.0 - 52.0 %    MCV 83 80 - 100 fL    MCH 29.9 26.0 - 34.0 pg    MCHC 36.1 (H) 32.0 - 36.0 g/dL    RDW 15.8 (H) 11.5 - 14.5 %    Platelets 53 (L) 150 - 450 x10*3/uL   POCT GLUCOSE   Result Value Ref Range    POCT Glucose 147 (H) 74 - 99 mg/dL   POCT GLUCOSE   Result Value Ref Range    POCT Glucose 148 (H) 74 - 99 mg/dL   POCT GLUCOSE   Result Value Ref Range    POCT Glucose 142 (H) 74 - 99 mg/dL   Blood Gas Arterial Full Panel   Result  Value Ref Range    POCT pH, Arterial 7.45 (H) 7.38 - 7.42 pH    POCT pCO2, Arterial 26 (L) 38 - 42 mm Hg    POCT pO2, Arterial 93 85 - 95 mm Hg    POCT SO2, Arterial 98 94 - 100 %    POCT Oxy Hemoglobin, Arterial 95.9 94.0 - 98.0 %    POCT Hematocrit Calculated, Arterial 26.0 (L) 41.0 - 52.0 %    POCT Sodium, Arterial 134 (L) 136 - 145 mmol/L    POCT Potassium, Arterial 4.1 3.5 - 5.3 mmol/L    POCT Chloride, Arterial 103 98 - 107 mmol/L    POCT Ionized Calcium, Arterial 1.03 (L) 1.10 - 1.33 mmol/L    POCT Glucose, Arterial 173 (H) 74 - 99 mg/dL    POCT Lactate, Arterial 2.8 (H) 0.4 - 2.0 mmol/L    POCT Base Excess, Arterial -5.0 (L) -2.0 - 3.0 mmol/L    POCT HCO3 Calculated, Arterial 18.1 (L) 22.0 - 26.0 mmol/L    POCT Hemoglobin, Arterial 8.7 (L) 13.5 - 17.5 g/dL    POCT Anion Gap, Arterial 17 10 - 25 mmo/L    Patient Temperature 37.0 degrees Celsius    FiO2 40 %   CBC   Result Value Ref Range    WBC 15.6 (H) 4.4 - 11.3 x10*3/uL    nRBC 4.0 (H) 0.0 - 0.0 /100 WBCs    RBC 2.92 (L) 4.50 - 5.90 x10*6/uL    Hemoglobin 8.7 (L) 13.5 - 17.5 g/dL    Hematocrit 23.9 (L) 41.0 - 52.0 %    MCV 82 80 - 100 fL    MCH 29.8 26.0 - 34.0 pg    MCHC 36.4 (H) 32.0 - 36.0 g/dL    RDW 15.4 (H) 11.5 - 14.5 %    Platelets 47 (L) 150 - 450 x10*3/uL   Heparin Assay, UFH   Result Value Ref Range    Heparin Unfractionated 0.2 See Comment Below for Therapeutic Ranges IU/mL   Magnesium   Result Value Ref Range    Magnesium 2.52 (H) 1.60 - 2.40 mg/dL   Renal Function Panel   Result Value Ref Range    Glucose 181 (H) 74 - 99 mg/dL    Sodium 135 (L) 136 - 145 mmol/L    Potassium 4.2 3.5 - 5.3 mmol/L    Chloride 100 98 - 107 mmol/L    Bicarbonate 21 21 - 32 mmol/L    Anion Gap 18 10 - 20 mmol/L    Urea Nitrogen 39 (H) 6 - 23 mg/dL    Creatinine 2.14 (H) 0.50 - 1.30 mg/dL    eGFR 34 (L) >60 mL/min/1.73m*2    Calcium 8.3 (L) 8.6 - 10.6 mg/dL    Phosphorus 2.7 2.5 - 4.9 mg/dL    Albumin 3.4 3.4 - 5.0 g/dL   Hepatic function panel   Result Value Ref  Range    Albumin 3.5 3.4 - 5.0 g/dL    Bilirubin, Total 14.6 (H) 0.0 - 1.2 mg/dL    Bilirubin, Direct 8.5 (H) 0.0 - 0.3 mg/dL    Alkaline Phosphatase 124 33 - 136 U/L     (H) 10 - 52 U/L     (H) 9 - 39 U/L    Total Protein 5.2 (L) 6.4 - 8.2 g/dL   POCT GLUCOSE   Result Value Ref Range    POCT Glucose 185 (H) 74 - 99 mg/dL   Blood Gas Arterial Full Panel   Result Value Ref Range    POCT pH, Arterial 7.49 (H) 7.38 - 7.42 pH    POCT pCO2, Arterial 26 (L) 38 - 42 mm Hg    POCT pO2, Arterial 123 (H) 85 - 95 mm Hg    POCT SO2, Arterial 100 94 - 100 %    POCT Oxy Hemoglobin, Arterial 96.9 94.0 - 98.0 %    POCT Hematocrit Calculated, Arterial 27.0 (L) 41.0 - 52.0 %    POCT Sodium, Arterial 132 (L) 136 - 145 mmol/L    POCT Potassium, Arterial 4.3 3.5 - 5.3 mmol/L    POCT Chloride, Arterial 103 98 - 107 mmol/L    POCT Ionized Calcium, Arterial 1.07 (L) 1.10 - 1.33 mmol/L    POCT Glucose, Arterial 200 (H) 74 - 99 mg/dL    POCT Lactate, Arterial 2.6 (H) 0.4 - 2.0 mmol/L    POCT Base Excess, Arterial -2.8 (L) -2.0 - 3.0 mmol/L    POCT HCO3 Calculated, Arterial 19.8 (L) 22.0 - 26.0 mmol/L    POCT Hemoglobin, Arterial 8.9 (L) 13.5 - 17.5 g/dL    POCT Anion Gap, Arterial 14 10 - 25 mmo/L    Patient Temperature 37.0 degrees Celsius    FiO2 40 %   Coagulation Screen   Result Value Ref Range    Protime 17.5 (H) 9.8 - 12.8 seconds    INR 1.5 (H) 0.9 - 1.1    aPTT 105 (HH) 27 - 38 seconds   Fibrinogen   Result Value Ref Range    Fibrinogen 171 (L) 200 - 400 mg/dL   CALCIUM, IONIZED   Result Value Ref Range    POCT Calcium, Ionized 1.03 (L) 1.1 - 1.33 mmol/L   Heparin Assay, UFH   Result Value Ref Range    Heparin Unfractionated 0.3 See Comment Below for Therapeutic Ranges IU/mL   POCT GLUCOSE   Result Value Ref Range    POCT Glucose 200 (H) 74 - 99 mg/dL     Scheduled medications  [START ON 3/29/2024] esomeprazole, 40 mg, nasogastric tube, Daily before breakfast  hydrocortisone sodium succinate, 50 mg, intravenous,  q12h  insulin lispro, 0-5 Units, subcutaneous, q4h  oxygen, , inhalation, Continuous - Inhalation  thiamine, 100 mg, intravenous, Daily      Continuous medications  epoprostenol, 0.01 mcg/kg/min (Ideal), Last Rate: 0.01 mcg/kg/min (03/28/24 0732)  heparin, 0-4,000 Units/hr, Last Rate: 1,600 Units/hr (03/28/24 1009)  lactated Ringer's, 5 mL/hr, Last Rate: 5 mL/hr (03/28/24 0600)  norepinephrine, 0.01-3 mcg/kg/min (Dosing Weight), Last Rate: 0.03 mcg/kg/min (03/28/24 1138)  PrismaSol 4/2.5, 2,400 mL/hr, Last Rate: 2,400 mL/hr (03/28/24 0110)      PRN medications  PRN medications: alteplase, calcium gluconate, calcium gluconate, dextrose **OR** glucagon, magnesium sulfate, magnesium sulfate            Assessment/Plan   Jason Hollins is a 63 y.o. 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 Hawkins and Dr. Smith on 3/5/24.  Post operative course was uncomplicated and patient was on RNF until 3/20 for prolonged bed rest to avoid hip flexion to maintain flap integrity.  Patient was on prophylactic lovenox while on bedrest.     3/20: Cardiopulmonary arrest s/p CPR with ROSC after TPA, overnight started on heparin, epo, increased pressor requirements, increased swelling at flap site.     3/21: Hemorrhagic shock, MTP. Firm and large right flap site. Return to OR s/p Exploration of right thigh wound, Evacuation of hematoma. Suture ligation of multiple bleeding vessel and several points in gluteal area.     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 Hawkins and Dr. Smith on 3/5/24 s/p cardiopulmonary arrest with ROSC 3/20. Documented to  have followed commands off sedation. CT head 3/22 without acute process. Weaned off cisatracurium and propofol overnight 3/23-3/24. Ketamine weaned off 3/25 and Fentanyl weaned off 3/26 am. Repeat CT Head 3/26 without acute process. Neuro exam 3/27 unchanged: Positive cough and gag on exam. PERRL. Unable to arouse. Not withdrawing to noxious stimuli. Neuro exam now improving 3/28: Patient opening eyes to verbal stimulation and following simple commands. Weak bilateral hand grasps and wiggles toes in left foot. Unable to move RLE.   - ongoing neuro and pain assessments  - avoid CNS depressants as able  - obtain vEEG -> severe diffuse encephalopathy -> discontinue EEG  - encephalopathy labs -> thiamine and Vit B6 pending (ammonia and TSH WNL)  - PT/OT -> PROM  - no need for soft wrist restraints at this time -> continue to reassess     CV: History of HTN, HLD. Acute cardiopulmonary arrest 2/2 to possible massive PE vs massive STEMI, received multiple rounds of CPR and one defibrillation.  Sustained ROSC achieved after TPA bolus. On high dose levophed, epinephrine, vaso, angioT2. Plan on 3/21 was for ECMO which was aborted secondary to large hemhorrge at right flap site. Had MTP and taken OR with 5L evacuated. ECHO 3/21: Normal LV, Mod enlarged RV, slight suggestion of a Townsend's sign / RV strain, low normal RV function. ECHO 3/22 with hyperdynamic LV. New onset Afib with RVR overnight 3/22-3/23, dosed with 150mg amio x2, started infusion at 1mg/min thereafter. AT2 weaned off. Amio infusion discontinued 3/25. Lactate downtrending. Vasopressin weaned off overnight. Levo and epi weaned off overnight. Remains in NSR. iEpo at 0.04. Lactate downtrending. Levo resumed yesterday evening, currently at 0.03.  - continuous EKG/abp/cvp monitoring  - Goal map range 65-90  - wean levo as tolerated  - continue to wean iEpo -> decrease to 0.01 and wean off this afternoon  - continue SDS at q12h  - continue hepatin infusion  -  continue to trend lactate  - continue aggressive fluid removal with CVVH as hemodynamics permit     PULM: Arrived to SICU intubated julio c-CPR. Concern for massive PE. Started on iEpo, currently on 0.05. Hypoxic respiratory failure. Severe ARDS. ETT exchanged 3/23. CT Chest 3/26 with interval JOHN consolidative/ground glass opacity. Currently on AC/VC 40%, 550, 24, +12.   - ARDSnet lung protective strategies  - Wean FiO2 as tolerated  - keep peep at 12 today  - iEpo as above in CV  - ABGs prn  - additional pulm toilet prn  - daily CXR  - consult ENT for trach placement -> potential OR Monday    ENT: Had epistaxis 3/23, completed afrin bid x3 days  - monitor, low threshold to consult ENT  - avoid NG placement for now     GI: NPO. Shock liver, pancreatitis. Elevated TG. Elevated lactate. Consulted ACS for potential bowel ischemia as cause of persistent lactic acidosis. CT imaging 3/22 with evidence of pancreatitis. No acute surgical indication per ACS. RUQ US 3/22 with thickening of GB wall without cholelithiasis. CT A/P 3/26 negative for acute bleed. LFTs downtrending. Worsening hyperbilirubinemia. Amylase and lipase now WNL.   - continue to increase TFs to goal 45ml/hr  - prostat daily  - PPI for GI ppx  - Trend LFTs daily  - add IV thiamine 100mg IV x7 days     : No history of renal disease. Baseline creatinine 0.6. Anuric RUTH. Elevated CK, downtrending. Metabolic acidosis, total body fluid overload, hyperkalemia. Started on CVVH 3/21, stopped bicarb infusion 3/22. Marino removed 3/24. Hyponatremia. Net negative 2.5L for past 24hrs.  - Nephrology following, appreciate recs  - continue CVVH -> goal negative 2L  - RFP BID and PRN  - Replete electrolytes judiciously  - Daily bladder scan     HEME: Patient was on ppx lovenox for DVT prophylaxis prior to cardiopulmonary arrest. Concern for massive PE patient received bolus of TPA during CPR. Started on heparin infusion overnight given concern for PE. Hemorrhagic shock  3/21, MTP and back to OR s/p Exploration of right thigh woundEvacuation of hematoma. Suture ligation of multiple bleeding vessel and several points in gluteal area. Hematology consulted 3/22 to r/o HLH. Transfused 1 RBC overnight 3/22-3/23. Thrombocytopenia, coagulapathy. LE Duplex 3/25 +acute occlusive R soleal DVT. Received 2u PRBC and 1u FFP on 3/26. Heparin infusion discontinued 3/26 to r/o HIT and transitioned to bival. PF4 negative, resumed heparin infuision 3/27. Received 1u PRBC 3/27 evening.  - cbc, coags bid and prn  - fibrinogen daily  - continue heaprin infusion  - hold off on vitamin K for now  - ongoing monitoring for s/s bleeding  - close surveillance of RLE and drains  - F/U heme recs -> NK function test pending  - Vas Med following, appreciate recs  - maintain active T&S (3/28)    ENDO: History of DM2. A1c 7.5%. TSH WNL 1/2024. Insulin infusion discontinued overnight.  - q4h BG checks and SSI     ID: Afebrile. Leukocytosis. On broad antimicrobial therapy. MRSA screen + 2/28/24. Pan cultures sent 3/22. Sputum NTF, +MRSA screen (completed 5 day course mupirocin), UCx and BCx negative. Completed 7 day course vanc/zosyn 3/27.   - temp q4h, wbc daily  - ongoing monitoring for s/s infection    Lines: 3/21  - right brachial arterial line (3/20)  - RIJ trialysis (3/27)  - left subclavian MAC with mini MAC (3/20)  - PIV x1     Dispo: Continue ICU care. Patient seen and discussed with ICU attending Dr. Rouse and ICU fellow Dr. Higgins.      Remy Peter, APRN-CNP  SICU phone [phone]    Critical care time: 52 minutes

## 2024-03-28 NOTE — H&P (VIEW-ONLY)
Reason For Consult  Tracheostomy Assessment    History Of Present Illness  Patient is a 63 year old man with past medical history significant for but not limited to DM II, HPL, HTN, gluteal myxoid chondrosarcoma. He is s/p wide resection of gluteal sarcoma, sciatic nerve neurolysis and right gluteal reconstruction, pedicle ALT, vasus lateralus flap, pedicle fluteal and perisformis flap, pelvic closure and incisional wound vac placement on 3/5/24. On 3/20/24, code blue called for episode of unresponsiveness. ROSC was achieved after 1 round of CPR, but patient became unresponsiveness again with PEA; it was suspected that patient had massive PE. He was intubated      Date of intubation/duration: 3/20/24  Indication for trach: prolonged intubation inability to wean  Anti-coagulation: heparin drip  INR:1.5  Platelet count: 47  Pressors/CVVH: Levophed    *HPI, PMH, PSH, FH, SH, and comprehensive ROS were attempted to be obtained from the patient, but the patient currently remains intubated and cannot provide this information. Therefore, the history recorded below represents information gathered from the primary team, nursing staff, EMR and family*    Past Medical History  He has a past medical history of Diabetes mellitus (CMS/HCC), Hyperlipidemia, and Morbid (severe) obesity due to excess calories (CMS/HCC) (05/31/2016).    Surgical History  He has a past surgical history that includes Other surgical history (05/31/2016); CT guided percutaneous biopsy muscle (11/13/2023); and Invasive Vascular Procedure (N/A, 3/20/2024).     Social History  He reports that he has never smoked. He has never used smokeless tobacco. He reports that he does not drink alcohol and does not use drugs.    Family History  Family History   Problem Relation Name Age of Onset    Coronary artery disease Mother      Diabetes Mother      Hypertension Mother      Cancer Father      Diabetes Sister      Diabetes Brother      Diabetes Other Grandmother   "       Allergies  Patient has no known allergies.    Review of Systems  ROS were attempted to be obtained from the patient, but the patient currently remains intubated and cannot provide this information     Physical Exam  PHYSICAL EXAMINATION:   Constitutional: well developed, well nourished  Voice:  Unable to evaluate secondary to intubation  Respiration:  Intubated, stable rate on ventilator, chest rise symmetric  Cardiovascular:  No clubbing/cyanosis/edema in hands  Eyes:  Eyelids and periorbital area without laceration or lesion, sclera normal  Neuro:  Intubated and sedated  Head and Face:  Symmetric facial features, no masses or lesions  Ears:  Normal external ears,  Nose: External nose midline, anterior rhinoscopy is normal with limited visualization to the anterior aspect of the interior turbinates, no bleeding or drainage, no lesions  Oral Cavity/Oropharynx/Lips:  ETT in place, visualized portions of mucous membranes/floor of mouth/tongue/OP normal, no masses or lesions are noted  Pharynx:  Unable to visualize due to ETT  Neck/Lymph:  No LAD, no thyroid masses, trachea midline, difficult neck extension with very left thyroid cartilage and non palpable cricoid cartilage   skin:  Neck skin is without scar or injury  Psych:  Sedated, unable to evaluate mood and affect       Last Recorded Vitals  Blood pressure (!) 90/46, pulse 81, temperature 36.4 °C (97.5 °F), temperature source Esophageal, resp. rate 26, height 1.778 m (5' 10\"), weight 133 kg (292 lb 8.8 oz), SpO2 97 %.    Relevant Results           Assessment/Plan   Patient is a 63-year-old male with a gluteal myxoid chondrosarcoma for which she had right gluteal reconstruction, pedicle ALT, vastus lateralis flap, pedicled fluteal and perisformis  flap on 3/5/2024.  Patient suffered PEA arrest on 3/20 and required 45 minutes to reach ROSC.  Patient has been intubated since with high PEEP.  ENT was consulted to evaluate for tracheostomy and the patient was " deemed to not have proper anatomy for bedside tracheostomy.  The patient will need to be scheduled for OR tracheostomy.    -ENT will add patient on to the OR for 4/1/2024  -ENT will obtain consent        Hernán Dunn MD  Dept. of Otolaryngology - Head and Neck Surgery, PGY-1  ENT Consults: [id number]  ENT Overnight (5p-6a), and Weekends: [phone]  ENT Head and Neck Surgery Phone: [phone]  ENT Peds: [id number]  ENT Outpatient scheduling number: [phone]

## 2024-03-28 NOTE — PROGRESS NOTES
Occupational Therapy    Re-Evaluation    Patient Name: Jason Hollins  MRN: [id number]  Today's Date: 3/28/2024  Time Calculation  Start Time: 1410  Stop Time: 1437  Time Calculation (min): 27 min    Assessment  IP OT Assessment  OT Assessment: Re-evaluation completed 2/2 significant change in functional status s/p unresponsive episode requiring CPR. Pt. with some command follow noted this date and demo movement in 3/4 extremities. Pt. with increased swelling/edema in all extremities impacting functional use. Recommend MOD intensity OT services when medically appropriate for discharge. Recommend continued acute care OT services to improve function.  Prognosis: Fair  Barriers to Discharge: Inaccessible home environment  Evaluation/Treatment Tolerance: Patient tolerated treatment well  End of Session Communication: Bedside nurse  End of Session Patient Position: Bed, 4 rail up, Alarm off, caregiver present  Plan:  Treatment Interventions: ADL retraining, Functional transfer training, Visual perceptual retraining, UE strengthening/ROM, Endurance training, Cognitive reorientation, Equipment evaluation/education, Neuromuscular reeducation, Fine motor coordination activities, Patient/family training, Compensatory technique education, UE splinting  OT Frequency: 4 times per week  OT Discharge Recommendations: Moderate intensity level of continued care  OT Recommended Transfer Status:  (tbd; medically unsafe to assess on OT re-eval on 3/28.)  OT - OK to Discharge: Yes (when medically appropriate)    Subjective   Current Problem:  1. Shock (CMS/HCC)  Transthoracic Echo (TTE) Limited    Transthoracic Echo (TTE) Limited    Intubation    Intubation    EEG    Central Line    Central Line    Transthoracic Echo (TTE) Limited      2. Extraskeletal myxoid chondrosarcoma (CMS/HCC)        3. Soft tissue sarcoma (CMS/HCC)  Surgical Pathology Exam    Surgical Pathology Exam    Case Request Operating Room: Femoral Artery Repair/Profundoplasty  w/Angiogram    Case Request Operating Room: Femoral Artery Repair/Profundoplasty w/Angiogram      4. Type 2 diabetes mellitus with hyperglycemia, without long-term current use of insulin (CMS/HCC)  Oral nutritional supplements      5. Cardiopulmonary arrest (CMS/HCC)  Intubation    Airway    Case Request Cath Lab: Pulmonary Angiogram    Case Request Cath Lab: Pulmonary Angiogram    Invasive vascular procedure    Invasive vascular procedure    Case Request Operating Room: Femoral Artery Repair/Profundoplasty w/Angiogram    Case Request Operating Room: Femoral Artery Repair/Profundoplasty w/Angiogram    Central Line    Central Line      6. Cardiac arrest (CMS/HCC)  Transthoracic Echo (TTE) Limited    Transthoracic Echo (TTE) Limited    Central Line    Central Line    Arterial Puncture/Cannulation    Arterial Puncture/Cannulation    Intubation    Intubation    CANCELED: Lower extremity venous duplex bilateral    CANCELED: Lower extremity venous duplex bilateral      7. Other pulmonary embolism without acute cor pulmonale (CMS/HCC)  Transthoracic Echo (TTE) Limited    Lower extremity venous duplex bilateral      8. Acute saddle pulmonary embolism with acute cor pulmonale (CMS/HCC)  Lower extremity venous duplex bilateral    Upper extremity venous duplex bilateral    Lower extremity venous duplex bilateral    Upper extremity venous duplex bilateral    Intubation    Intubation      9. Cardiac arrest due to other underlying condition (CMS/HCC)  Invasive vascular procedure      10. Cardiogenic shock (CMS/HCC)  Invasive vascular procedure      11. Other specified soft tissue disorders  Upper extremity venous duplex bilateral      12. Other specified soft tissue disorders  Upper extremity venous duplex bilateral      13. RUTH (acute kidney injury) (CMS/HCC)  Central Line    Central Line      14. Neoplastic malignant related fatigue          General:  General  Reason for Referral: 62 y/o M s/p Right gluteal extraskeletal myxoid  chondrosarcoma s/p wide resection with extensive tissue reconstruction, pedicle ALT, vastus lateralus flap, pedicle gluteal and periformis flap with incisional wound vac placement on 3/5. Unresponsive on 3/20 with CPR initiated with transfer to CTICU, intubated and with CVVHD on. Presumed PE and treated with tPA. R soleal DVT.  Past Medical History Relevant to Rehab: DM II, HLD, HTN, right gluteal extraskeletal myxoid chondrosarcoma  Missed Visit: Yes  Missed Visit Reason: Patient placed on medical hold (Plan for CT for suspected bleeding. Defer OT at this time.)  Family/Caregiver Present: No  Co-Treatment Reason: occupational therapist observing and OT student present.  Prior to Session Communication: Bedside nurse  Patient Position Received: Bed, 4 rail up (sand bed)  Preferred Learning Style: auditory, verbal, visual, kinesthetic  General Comment: Per communication with sicu team and RN, ok to see. Patient opening eyes to name. (Wound vac to RLE in place, CVVHD with R neck catheter Vent Settings: VC-AC 40% FiO2, 12 PEEP, 24 RR. Drips: Levo, Veletri- no titration by RN during session.)  Precautions:  Hearing/Visual Limitations: hearing appeared WFL; difficult to fully assess vision with red/bloody eyes noted.  Vital Signs:  Heart Rate: 82 (post: 82)  Resp: (!) 30 (post: 29)  SpO2: 98 % (post: 98%)  BP: 132/53 (post: 126/50)  MAP (mmHg): 74 (post: 69)  Pain:  Pain Assessment  Pain Assessment: CPOT (Critical Care Pain Observation Tool)  Pain Score: 0 - No pain    Objective   Cognition:  Overall Cognitive Status: Impaired  Arousal/Alertness: Delayed responses to stimuli (inconsistent response when fatigued.)  Orientation Level: Disoriented to time, Disoriented to situation (consistently opening eyes to name, nodding head yes to hospital with options provided.)  Following Commands: Follows one step commands with increased time (and repetition (at least 50% overall))  Safety Judgment: Unable to assess  Cognition  Comments: Responsive to loud simple commands with increased time for processing and responding. Demo movement in 3/4 extremities (no movement noted in RLE; consistent with RN report).  Processing Speed: Delayed        Martinez Agitation Sedation Scale  Martinez Agitation Sedation Scale (RASS): Drowsy  Home Living:  Type of Home: House  Lives With: Spouse  Home Adaptive Equipment: Cane  Home Layout: Two level, Stairs to alternate level with rails  Alternate Level Stairs-Rails: Left  Alternate Level Stairs-Number of Steps: 13  Home Access: Stairs to enter without rails  Entrance Stairs-Rails: None  Entrance Stairs-Number of Steps: 2  Bathroom Shower/Tub: Tub/shower unit, Walk-in shower  Bathroom Toilet: Standard  Bathroom Equipment: None  Home Living Comments: Information obtained from initial therapy evaluation. No family present during re-eval and pt unable to confirm PLOF.   Prior Function:  Level of [place]: Independent with ADLs and functional transfers, Independent with homemaking with ambulation  Receives Help From: Family  Vocational: Full time employment ()  Leisure:   Hand Dominance: Ambidextrous  Prior Function Comments: Pt was independent with all mobility without an assistive device in the household and in the community. This information obtained from initial therapy evaluation. No family present during re-eval and pt unable to confirm PLOF.     ADL:  Eating Assistance: Total  Grooming Assistance: Total  Bathing Assistance: Total  UE Dressing Assistance: Total  LE Dressing Assistance: Total  Toileting Assistance with Device: Total     Bed Mobility/Transfers: Bed Mobility  Bed Mobility: Yes  Bed Mobility 1  Bed Mobility Comments 1: HOB elevation up to 57 degrees flexion for at least 10 min with hemodynamic stability noted. Decreased alertness noted when HOB decreased to 35 degrees.    Transfers  Transfer: No (unsafe to progress this date.)       Vision: Vision - Basic  Assessment  Current Vision:  (No glasses present; vision with red/bloody eyes noted; some sustained eye opening; no tracking observed.)     Strength:  Strength Comments: BUE finger flexion, elbow flexion, elbow extension 2-/5, R shoulder extension 2-/5; no other movements observed. Pt. with increased swelling/edema in all extremities.     Coordination:  Coordination Comment: impaired due to swelling/edema and command follow/alertness.   Hand Function:  Hand Function  Gross Grasp: Impaired  Coordination: Impaired  Extremities: RUE   RUE : Exceptions to WFL  RUE AROM (degrees)  RUE AROM Comment: shoulder flex to ~75 degrees due to swelling and vent tubing placement; distally full ROM limited by swelling., LUE   LUE: Exceptions to WFL  LUE AROM (degrees)  LUE AROM Comment: Shoulder flexion to 75 degrees with CVVHD tube positioning; distally full ROM limited by swelling., RLE   RLE :  (No movement observed in RLE.), and LLE   LLE :  (Pt. demo wiggling toes.)      Outcome Measures: [facility] Daily Activity  Putting on and taking off regular lower body clothing: Total  Bathing (including washing, rinsing, drying): Total  Putting on and taking off regular upper body clothing: Total  Toileting, which includes using toilet, bedpan or urinal: Total  Taking care of personal grooming such as brushing teeth: Total  Eating Meals: Total  Daily Activity - Total Score: 6     Confusion Assessment Method-ICU (CAM-ICU)  Feature 1: Acute Onset or Fluctuating Course: Positive  Feature 2: Inattention: Positive  Feature 3: Altered Level of Consciousness: Positive  Overall CAM-ICU: Positive  Education Documentation  Body Mechanics, taught by Fe Whitehead OT at 3/28/2024  3:37 PM.  Learner: Patient  Readiness: Acceptance  Method: Explanation, Demonstration  Response: Needs Reinforcement    Precautions, taught by Fe Whitehead OT at 3/28/2024  3:37 PM.  Learner: Patient  Readiness: Acceptance  Method: Explanation,  Demonstration  Response: Needs Reinforcement    ADL Training, taught by Fe Whitehead OT at 3/28/2024  3:37 PM.  Learner: Patient  Readiness: Acceptance  Method: Explanation, Demonstration  Response: Needs Reinforcement    Education Comments  No comments found.      Goals:   Encounter Problems       Encounter Problems (Active)       ADLs       Patient will perform UB and LB bathing  with stand by assist level of assistance and AE. (Not met)       Start:  03/19/24    Expected End:  04/09/24    Resolved:  03/28/24    Updated to: Patient will perform UB and LB bathing  with MAX assist level of assistance and AE.    Update reason: change in functional status         Patient with complete upper body dressing with independent level  (Not met)       Start:  03/19/24    Expected End:  04/09/24    Resolved:  03/28/24    Updated to: Patient with complete upper body dressing with MAX A    Update reason: change in functional status         Patient with complete lower body dressing with minimal assist  level of assistance donning and doffing all LE clothes  with PRN adaptive equipment  (Not met)       Start:  03/19/24    Expected End:  04/09/24    Resolved:  03/28/24    Updated to: Patient with complete lower body dressing with MAX assist  level of assistance donning and doffing all LE clothes  with PRN adaptive equipment    Update reason: change in functional status         Patient will complete daily grooming tasks  with independent level  (Not met)       Start:  03/19/24    Expected End:  04/09/24    Resolved:  03/28/24    Updated to: Patient will complete daily grooming tasks with MAX A.    Update reason: change in functional status         Patient will complete toileting including hygiene clothing management/hygiene with moderate assist level of assistance and LRD. (Not met)       Start:  03/19/24    Expected End:  04/09/24    Resolved:  03/28/24    Updated to: Patient will complete toileting including hygiene clothing  management/hygiene with MAX assist level of assistance and LRD.    Update reason: change in functional status         Patient will perform UB and LB bathing  with MAX assist level of assistance and AE. (Not Progressing)       Start:  03/28/24    Expected End:  04/11/24                Patient with complete upper body dressing with MAX A (Progressing)       Start:  03/28/24    Expected End:  04/11/24                Patient with complete lower body dressing with MAX assist  level of assistance donning and doffing all LE clothes  with PRN adaptive equipment (Progressing)       Start:  03/28/24    Expected End:  04/11/24                Patient will complete daily grooming tasks with MAX A. (Progressing)       Start:  03/28/24    Expected End:  04/11/24                Patient will complete toileting including hygiene clothing management/hygiene with MAX assist level of assistance and LRD. (Progressing)       Start:  03/28/24    Expected End:  04/11/24                   EXERCISE/STRENGTHENING       Patient with increase BUE to WFL strength. (Not met)       Start:  03/19/24    Expected End:  04/09/24    Resolved:  03/28/24    Updated to: Patient with increase BUE to 2/5 strength.    Update reason: change in functional status         Patient with increase BUE to 2/5 strength. (Progressing)       Start:  03/28/24    Expected End:  04/11/24                   TRANSFERS       Patient will perform bed mobility moderate assist level of assistance and bed rails in order to improve safety and independence with mobility (Not met)       Start:  03/19/24    Expected End:  04/09/24    Resolved:  03/28/24    Updated to: Patient will perform bed mobility moderate assist x2 level of assistance and bed rails in order to improve safety and independence with mobility    Update reason: change in functional status         Patient will complete functional transfer with least restrictive device with moderate assist level of assistance. (Not met)        Start:  03/19/24    Expected End:  04/09/24    Resolved:  03/28/24    Updated to: Patient will complete functional transfer with least restrictive device with moderate assist x2 level of assistance.    Update reason: change in functional status         Patient will perform bed mobility moderate assist x2 level of assistance and bed rails in order to improve safety and independence with mobility (Progressing)       Start:  03/28/24    Expected End:  04/11/24                Patient will complete functional transfer with least restrictive device with moderate assist x2 level of assistance. (Progressing)       Start:  03/28/24    Expected End:  04/11/24                      Encounter Problems (Resolved)       MOBILITY       Patient will perform Functional mobility min Household distances/Community Distances with moderate assist level of assistance and least restrictive device in order to improve safety and functional mobility. (Not met)       Start:  03/19/24    Expected End:  04/09/24    Resolved:  03/28/24              Fe Whitehead OT  03/28/2024

## 2024-03-28 NOTE — CONSULTS
Reason For Consult  Tracheostomy Assessment    History Of Present Illness  Patient is a 63 year old man with past medical history significant for but not limited to DM II, HPL, HTN, gluteal myxoid chondrosarcoma. He is s/p wide resection of gluteal sarcoma, sciatic nerve neurolysis and right gluteal reconstruction, pedicle ALT, vasus lateralus flap, pedicle fluteal and perisformis flap, pelvic closure and incisional wound vac placement on 3/5/24. On 3/20/24, code blue called for episode of unresponsiveness. ROSC was achieved after 1 round of CPR, but patient became unresponsiveness again with PEA; it was suspected that patient had massive PE. He was intubated      Date of intubation/duration: 3/20/24  Indication for trach: prolonged intubation inability to wean  Anti-coagulation: heparin drip  INR:1.5  Platelet count: 47  Pressors/CVVH: Levophed    *HPI, PMH, PSH, FH, SH, and comprehensive ROS were attempted to be obtained from the patient, but the patient currently remains intubated and cannot provide this information. Therefore, the history recorded below represents information gathered from the primary team, nursing staff, EMR and family*    Past Medical History  He has a past medical history of Diabetes mellitus (CMS/HCC), Hyperlipidemia, and Morbid (severe) obesity due to excess calories (CMS/HCC) (05/31/2016).    Surgical History  He has a past surgical history that includes Other surgical history (05/31/2016); CT guided percutaneous biopsy muscle (11/13/2023); and Invasive Vascular Procedure (N/A, 3/20/2024).     Social History  He reports that he has never smoked. He has never used smokeless tobacco. He reports that he does not drink alcohol and does not use drugs.    Family History  Family History   Problem Relation Name Age of Onset    Coronary artery disease Mother      Diabetes Mother      Hypertension Mother      Cancer Father      Diabetes Sister      Diabetes Brother      Diabetes Other Grandmother   "       Allergies  Patient has no known allergies.    Review of Systems  ROS were attempted to be obtained from the patient, but the patient currently remains intubated and cannot provide this information     Physical Exam  PHYSICAL EXAMINATION:   Constitutional: well developed, well nourished  Voice:  Unable to evaluate secondary to intubation  Respiration:  Intubated, stable rate on ventilator, chest rise symmetric  Cardiovascular:  No clubbing/cyanosis/edema in hands  Eyes:  Eyelids and periorbital area without laceration or lesion, sclera normal  Neuro:  Intubated and sedated  Head and Face:  Symmetric facial features, no masses or lesions  Ears:  Normal external ears,  Nose: External nose midline, anterior rhinoscopy is normal with limited visualization to the anterior aspect of the interior turbinates, no bleeding or drainage, no lesions  Oral Cavity/Oropharynx/Lips:  ETT in place, visualized portions of mucous membranes/floor of mouth/tongue/OP normal, no masses or lesions are noted  Pharynx:  Unable to visualize due to ETT  Neck/Lymph:  No LAD, no thyroid masses, trachea midline, difficult neck extension with very left thyroid cartilage and non palpable cricoid cartilage   skin:  Neck skin is without scar or injury  Psych:  Sedated, unable to evaluate mood and affect       Last Recorded Vitals  Blood pressure (!) 90/46, pulse 81, temperature 36.4 °C (97.5 °F), temperature source Esophageal, resp. rate 26, height 1.778 m (5' 10\"), weight 133 kg (292 lb 8.8 oz), SpO2 97 %.    Relevant Results           Assessment/Plan   Patient is a 63-year-old male with a gluteal myxoid chondrosarcoma for which she had right gluteal reconstruction, pedicle ALT, vastus lateralis flap, pedicled fluteal and perisformis  flap on 3/5/2024.  Patient suffered PEA arrest on 3/20 and required 45 minutes to reach ROSC.  Patient has been intubated since with high PEEP.  ENT was consulted to evaluate for tracheostomy and the patient was " deemed to not have proper anatomy for bedside tracheostomy.  The patient will need to be scheduled for OR tracheostomy.    -ENT will add patient on to the OR for 4/1/2024  -ENT will obtain consent        Hernán Dunn MD  Dept. of Otolaryngology - Head and Neck Surgery, PGY-1  ENT Consults: [id number]  ENT Overnight (5p-6a), and Weekends: [id number]  ENT Head and Neck Surgery Phone: [phone]  ENT Peds: [id number]  ENT Outpatient scheduling number: [phone]

## 2024-03-28 NOTE — PROCEDURES
I evaluated the patient during while s/he was receiving CVVH    BP: 136/55 off pressors  BFR: 200  Replacement fluid: Prismasol 4K/2.5Ca  Flow rate: 800/pump/800/filter/800    Plan: OK to discontinue CVVH

## 2024-03-28 NOTE — PROCEDURES
I evaluated the patient during while s/he was receiving CVVH    BP: 120/53 on norepi  BFR: 200  Replacement fluid: Prismasol 4K/2.5Ca  Flow rate: 800/pump/800/filter/800    Plan: Continue CVVH until weaned from pressors

## 2024-03-29 ENCOUNTER — APPOINTMENT (OUTPATIENT)
Dept: RADIOLOGY | Facility: HOSPITAL | Age: 64
DRG: 003 | End: 2024-03-29
Payer: COMMERCIAL

## 2024-03-29 ENCOUNTER — APPOINTMENT (OUTPATIENT)
Dept: CARDIOLOGY | Facility: HOSPITAL | Age: 64
DRG: 003 | End: 2024-03-29
Payer: COMMERCIAL

## 2024-03-29 LAB
ALBUMIN SERPL BCP-MCNC: 3.3 G/DL (ref 3.4–5)
ALBUMIN SERPL BCP-MCNC: 3.3 G/DL (ref 3.4–5)
ALBUMIN SERPL BCP-MCNC: 3.4 G/DL (ref 3.4–5)
ALBUMIN SERPL BCP-MCNC: 3.5 G/DL (ref 3.4–5)
ALBUMIN SERPL BCP-MCNC: 3.5 G/DL (ref 3.4–5)
ALP SERPL-CCNC: 116 U/L (ref 33–136)
ALT SERPL W P-5'-P-CCNC: 361 U/L (ref 10–52)
ANION GAP BLDA CALCULATED.4IONS-SCNC: 10 MMO/L (ref 10–25)
ANION GAP BLDA CALCULATED.4IONS-SCNC: 11 MMO/L (ref 10–25)
ANION GAP BLDA CALCULATED.4IONS-SCNC: 13 MMO/L (ref 10–25)
ANION GAP BLDA CALCULATED.4IONS-SCNC: 14 MMO/L (ref 10–25)
ANION GAP BLDA CALCULATED.4IONS-SCNC: 14 MMO/L (ref 10–25)
ANION GAP BLDA CALCULATED.4IONS-SCNC: 15 MMO/L (ref 10–25)
ANION GAP SERPL CALC-SCNC: 15 MMOL/L (ref 10–20)
ANION GAP SERPL CALC-SCNC: 17 MMOL/L (ref 10–20)
APTT PPP: 95 SECONDS (ref 27–38)
APTT PPP: 95 SECONDS (ref 27–38)
AST SERPL W P-5'-P-CCNC: 119 U/L (ref 9–39)
BASE EXCESS BLDA CALC-SCNC: -0.1 MMOL/L (ref -2–3)
BASE EXCESS BLDA CALC-SCNC: -0.7 MMOL/L (ref -2–3)
BASE EXCESS BLDA CALC-SCNC: -0.8 MMOL/L (ref -2–3)
BASE EXCESS BLDA CALC-SCNC: -1.4 MMOL/L (ref -2–3)
BASE EXCESS BLDA CALC-SCNC: -2.2 MMOL/L (ref -2–3)
BASE EXCESS BLDA CALC-SCNC: 0 MMOL/L (ref -2–3)
BASOPHILS # BLD AUTO: 0.04 X10*3/UL (ref 0–0.1)
BASOPHILS NFR BLD AUTO: 0.3 %
BILIRUB DIRECT SERPL-MCNC: 9.4 MG/DL (ref 0–0.3)
BILIRUB SERPL-MCNC: 16.2 MG/DL (ref 0–1.2)
BODY TEMPERATURE: 37 DEGREES CELSIUS
BUN SERPL-MCNC: 48 MG/DL (ref 6–23)
BUN SERPL-MCNC: 49 MG/DL (ref 6–23)
BUN SERPL-MCNC: 50 MG/DL (ref 6–23)
BUN SERPL-MCNC: 54 MG/DL (ref 6–23)
CA-I BLD-SCNC: 1.03 MMOL/L (ref 1.1–1.33)
CA-I BLD-SCNC: 1.07 MMOL/L (ref 1.1–1.33)
CA-I BLD-SCNC: 1.08 MMOL/L (ref 1.1–1.33)
CA-I BLDA-SCNC: 1.05 MMOL/L (ref 1.1–1.33)
CA-I BLDA-SCNC: 1.09 MMOL/L (ref 1.1–1.33)
CA-I BLDA-SCNC: 1.1 MMOL/L (ref 1.1–1.33)
CA-I BLDA-SCNC: 1.12 MMOL/L (ref 1.1–1.33)
CALCIUM SERPL-MCNC: 8 MG/DL (ref 8.6–10.6)
CALCIUM SERPL-MCNC: 8.2 MG/DL (ref 8.6–10.6)
CALCIUM SERPL-MCNC: 8.3 MG/DL (ref 8.6–10.6)
CALCIUM SERPL-MCNC: 8.5 MG/DL (ref 8.6–10.6)
CHLORIDE BLDA-SCNC: 101 MMOL/L (ref 98–107)
CHLORIDE BLDA-SCNC: 102 MMOL/L (ref 98–107)
CHLORIDE BLDA-SCNC: 103 MMOL/L (ref 98–107)
CHLORIDE BLDA-SCNC: 103 MMOL/L (ref 98–107)
CHLORIDE BLDA-SCNC: 104 MMOL/L (ref 98–107)
CHLORIDE BLDA-SCNC: 104 MMOL/L (ref 98–107)
CHLORIDE SERPL-SCNC: 101 MMOL/L (ref 98–107)
CHLORIDE SERPL-SCNC: 101 MMOL/L (ref 98–107)
CHLORIDE SERPL-SCNC: 102 MMOL/L (ref 98–107)
CHLORIDE SERPL-SCNC: 103 MMOL/L (ref 98–107)
CO2 SERPL-SCNC: 22 MMOL/L (ref 21–32)
CO2 SERPL-SCNC: 23 MMOL/L (ref 21–32)
CO2 SERPL-SCNC: 24 MMOL/L (ref 21–32)
CO2 SERPL-SCNC: 24 MMOL/L (ref 21–32)
CREAT SERPL-MCNC: 2.04 MG/DL (ref 0.5–1.3)
CREAT SERPL-MCNC: 2.06 MG/DL (ref 0.5–1.3)
CREAT SERPL-MCNC: 2.13 MG/DL (ref 0.5–1.3)
CREAT SERPL-MCNC: 2.16 MG/DL (ref 0.5–1.3)
EGFRCR SERPLBLD CKD-EPI 2021: 34 ML/MIN/1.73M*2
EGFRCR SERPLBLD CKD-EPI 2021: 34 ML/MIN/1.73M*2
EGFRCR SERPLBLD CKD-EPI 2021: 36 ML/MIN/1.73M*2
EGFRCR SERPLBLD CKD-EPI 2021: 36 ML/MIN/1.73M*2
EJECTION FRACTION APICAL 4 CHAMBER: 54.1
EJECTION FRACTION: 57 %
EOSINOPHIL # BLD AUTO: 0.01 X10*3/UL (ref 0–0.7)
EOSINOPHIL NFR BLD AUTO: 0.1 %
ERYTHROCYTE [DISTWIDTH] IN BLOOD BY AUTOMATED COUNT: 16.5 % (ref 11.5–14.5)
ERYTHROCYTE [DISTWIDTH] IN BLOOD BY AUTOMATED COUNT: 16.7 % (ref 11.5–14.5)
ERYTHROCYTE [DISTWIDTH] IN BLOOD BY AUTOMATED COUNT: 16.7 % (ref 11.5–14.5)
ERYTHROCYTE [DISTWIDTH] IN BLOOD BY AUTOMATED COUNT: 17.2 % (ref 11.5–14.5)
ERYTHROCYTE [DISTWIDTH] IN BLOOD BY AUTOMATED COUNT: 18.4 % (ref 11.5–14.5)
FIBRINOGEN PPP-MCNC: 170 MG/DL (ref 200–400)
FIBRINOGEN PPP-MCNC: 181 MG/DL (ref 200–400)
FIBRINOGEN PPP-MCNC: 185 MG/DL (ref 200–400)
GLUCOSE BLD MANUAL STRIP-MCNC: 125 MG/DL (ref 74–99)
GLUCOSE BLD MANUAL STRIP-MCNC: 143 MG/DL (ref 74–99)
GLUCOSE BLD MANUAL STRIP-MCNC: 158 MG/DL (ref 74–99)
GLUCOSE BLD MANUAL STRIP-MCNC: 162 MG/DL (ref 74–99)
GLUCOSE BLD MANUAL STRIP-MCNC: 165 MG/DL (ref 74–99)
GLUCOSE BLD MANUAL STRIP-MCNC: 173 MG/DL (ref 74–99)
GLUCOSE BLD MANUAL STRIP-MCNC: 192 MG/DL (ref 74–99)
GLUCOSE BLD MANUAL STRIP-MCNC: 192 MG/DL (ref 74–99)
GLUCOSE BLD MANUAL STRIP-MCNC: 201 MG/DL (ref 74–99)
GLUCOSE BLD MANUAL STRIP-MCNC: 226 MG/DL (ref 74–99)
GLUCOSE BLD MANUAL STRIP-MCNC: 238 MG/DL (ref 74–99)
GLUCOSE BLDA-MCNC: 129 MG/DL (ref 74–99)
GLUCOSE BLDA-MCNC: 135 MG/DL (ref 74–99)
GLUCOSE BLDA-MCNC: 149 MG/DL (ref 74–99)
GLUCOSE BLDA-MCNC: 168 MG/DL (ref 74–99)
GLUCOSE BLDA-MCNC: 176 MG/DL (ref 74–99)
GLUCOSE BLDA-MCNC: 192 MG/DL (ref 74–99)
GLUCOSE SERPL-MCNC: 142 MG/DL (ref 74–99)
GLUCOSE SERPL-MCNC: 171 MG/DL (ref 74–99)
GLUCOSE SERPL-MCNC: 203 MG/DL (ref 74–99)
GLUCOSE SERPL-MCNC: 206 MG/DL (ref 74–99)
HCO3 BLDA-SCNC: 20.6 MMOL/L (ref 22–26)
HCO3 BLDA-SCNC: 22.2 MMOL/L (ref 22–26)
HCO3 BLDA-SCNC: 22.8 MMOL/L (ref 22–26)
HCO3 BLDA-SCNC: 23.1 MMOL/L (ref 22–26)
HCT VFR BLD AUTO: 22.2 % (ref 41–52)
HCT VFR BLD AUTO: 22.8 % (ref 41–52)
HCT VFR BLD AUTO: 23.2 % (ref 41–52)
HCT VFR BLD AUTO: 23.2 % (ref 41–52)
HCT VFR BLD AUTO: 24.8 % (ref 41–52)
HCT VFR BLD EST: 25 % (ref 41–52)
HCT VFR BLD EST: 26 % (ref 41–52)
HCT VFR BLD EST: 27 % (ref 41–52)
HCT VFR BLD EST: 27 % (ref 41–52)
HGB BLD-MCNC: 8.1 G/DL (ref 13.5–17.5)
HGB BLD-MCNC: 8.2 G/DL (ref 13.5–17.5)
HGB BLD-MCNC: 8.6 G/DL (ref 13.5–17.5)
HGB BLD-MCNC: 8.6 G/DL (ref 13.5–17.5)
HGB BLD-MCNC: 8.7 G/DL (ref 13.5–17.5)
HGB BLDA-MCNC: 8.3 G/DL (ref 13.5–17.5)
HGB BLDA-MCNC: 8.6 G/DL (ref 13.5–17.5)
HGB BLDA-MCNC: 8.8 G/DL (ref 13.5–17.5)
HGB BLDA-MCNC: 8.8 G/DL (ref 13.5–17.5)
HGB BLDA-MCNC: 9 G/DL (ref 13.5–17.5)
HGB BLDA-MCNC: 9 G/DL (ref 13.5–17.5)
IMM GRANULOCYTES # BLD AUTO: 0.85 X10*3/UL (ref 0–0.7)
IMM GRANULOCYTES NFR BLD AUTO: 5.6 % (ref 0–0.9)
INHALED O2 CONCENTRATION: 40 %
INHALED O2 CONCENTRATION: 50 %
INR PPP: 1.5 (ref 0.9–1.1)
INR PPP: 1.5 (ref 0.9–1.1)
LACTATE BLDA-SCNC: 1.8 MMOL/L (ref 0.4–2)
LACTATE BLDA-SCNC: 2 MMOL/L (ref 0.4–2)
LACTATE BLDA-SCNC: 2.1 MMOL/L (ref 0.4–2)
LACTATE BLDA-SCNC: 2.1 MMOL/L (ref 0.4–2)
LACTATE BLDA-SCNC: 2.7 MMOL/L (ref 0.4–2)
LACTATE BLDA-SCNC: 3.2 MMOL/L (ref 0.4–2)
LEFT VENTRICLE INTERNAL DIMENSION DIASTOLE: 4.3 CM (ref 3.5–6)
LYMPHOCYTES # BLD AUTO: 0.32 X10*3/UL (ref 1.2–4.8)
LYMPHOCYTES NFR BLD AUTO: 2.1 %
MAGNESIUM SERPL-MCNC: 2.66 MG/DL (ref 1.6–2.4)
MAGNESIUM SERPL-MCNC: 2.67 MG/DL (ref 1.6–2.4)
MAGNESIUM SERPL-MCNC: 2.71 MG/DL (ref 1.6–2.4)
MAGNESIUM SERPL-MCNC: 2.76 MG/DL (ref 1.6–2.4)
MCH RBC QN AUTO: 29.1 PG (ref 26–34)
MCH RBC QN AUTO: 29.6 PG (ref 26–34)
MCH RBC QN AUTO: 30 PG (ref 26–34)
MCH RBC QN AUTO: 30.2 PG (ref 26–34)
MCH RBC QN AUTO: 30.2 PG (ref 26–34)
MCHC RBC AUTO-ENTMCNC: 35.1 G/DL (ref 32–36)
MCHC RBC AUTO-ENTMCNC: 35.5 G/DL (ref 32–36)
MCHC RBC AUTO-ENTMCNC: 36.9 G/DL (ref 32–36)
MCHC RBC AUTO-ENTMCNC: 37.1 G/DL (ref 32–36)
MCHC RBC AUTO-ENTMCNC: 37.1 G/DL (ref 32–36)
MCV RBC AUTO: 80 FL (ref 80–100)
MCV RBC AUTO: 81 FL (ref 80–100)
MCV RBC AUTO: 81 FL (ref 80–100)
MCV RBC AUTO: 83 FL (ref 80–100)
MCV RBC AUTO: 84 FL (ref 80–100)
MITRAL VALVE E/A RATIO: 1.39
MONOCYTES # BLD AUTO: 0.45 X10*3/UL (ref 0.1–1)
MONOCYTES NFR BLD AUTO: 3 %
NEUTROPHILS # BLD AUTO: 13.44 X10*3/UL (ref 1.2–7.7)
NEUTROPHILS NFR BLD AUTO: 88.9 %
NRBC BLD-RTO: 1.8 /100 WBCS (ref 0–0)
NRBC BLD-RTO: 2.2 /100 WBCS (ref 0–0)
NRBC BLD-RTO: 2.2 /100 WBCS (ref 0–0)
NRBC BLD-RTO: 2.5 /100 WBCS (ref 0–0)
NRBC BLD-RTO: 2.5 /100 WBCS (ref 0–0)
OXYHGB MFR BLDA: 90.6 % (ref 94–98)
OXYHGB MFR BLDA: 96.2 % (ref 94–98)
OXYHGB MFR BLDA: 96.4 % (ref 94–98)
OXYHGB MFR BLDA: 96.5 % (ref 94–98)
OXYHGB MFR BLDA: 97 % (ref 94–98)
OXYHGB MFR BLDA: 97 % (ref 94–98)
PCO2 BLDA: 27 MM HG (ref 38–42)
PCO2 BLDA: 31 MM HG (ref 38–42)
PCO2 BLDA: 31 MM HG (ref 38–42)
PCO2 BLDA: 32 MM HG (ref 38–42)
PCO2 BLDA: 32 MM HG (ref 38–42)
PCO2 BLDA: 34 MM HG (ref 38–42)
PH BLDA: 7.44 PH (ref 7.38–7.42)
PH BLDA: 7.45 PH (ref 7.38–7.42)
PH BLDA: 7.46 PH (ref 7.38–7.42)
PH BLDA: 7.48 PH (ref 7.38–7.42)
PH BLDA: 7.48 PH (ref 7.38–7.42)
PH BLDA: 7.49 PH (ref 7.38–7.42)
PHOSPHATE SERPL-MCNC: 2.1 MG/DL (ref 2.5–4.9)
PHOSPHATE SERPL-MCNC: 2.2 MG/DL (ref 2.5–4.9)
PHOSPHATE SERPL-MCNC: 2.3 MG/DL (ref 2.5–4.9)
PHOSPHATE SERPL-MCNC: 2.5 MG/DL (ref 2.5–4.9)
PLATELET # BLD AUTO: 36 X10*3/UL (ref 150–450)
PLATELET # BLD AUTO: 44 X10*3/UL (ref 150–450)
PLATELET # BLD AUTO: 46 X10*3/UL (ref 150–450)
PLATELET # BLD AUTO: 46 X10*3/UL (ref 150–450)
PLATELET # BLD AUTO: 57 X10*3/UL (ref 150–450)
PO2 BLDA: 111 MM HG (ref 85–95)
PO2 BLDA: 113 MM HG (ref 85–95)
PO2 BLDA: 134 MM HG (ref 85–95)
PO2 BLDA: 141 MM HG (ref 85–95)
PO2 BLDA: 59 MM HG (ref 85–95)
PO2 BLDA: 90 MM HG (ref 85–95)
POTASSIUM BLDA-SCNC: 4.2 MMOL/L (ref 3.5–5.3)
POTASSIUM BLDA-SCNC: 4.2 MMOL/L (ref 3.5–5.3)
POTASSIUM BLDA-SCNC: 4.3 MMOL/L (ref 3.5–5.3)
POTASSIUM BLDA-SCNC: 4.4 MMOL/L (ref 3.5–5.3)
POTASSIUM SERPL-SCNC: 4.2 MMOL/L (ref 3.5–5.3)
POTASSIUM SERPL-SCNC: 4.3 MMOL/L (ref 3.5–5.3)
POTASSIUM SERPL-SCNC: 4.4 MMOL/L (ref 3.5–5.3)
POTASSIUM SERPL-SCNC: 4.5 MMOL/L (ref 3.5–5.3)
PROT SERPL-MCNC: 5.2 G/DL (ref 6.4–8.2)
PROTHROMBIN TIME: 16.7 SECONDS (ref 9.8–12.8)
PROTHROMBIN TIME: 16.7 SECONDS (ref 9.8–12.8)
RBC # BLD AUTO: 2.7 X10*6/UL (ref 4.5–5.9)
RBC # BLD AUTO: 2.77 X10*6/UL (ref 4.5–5.9)
RBC # BLD AUTO: 2.85 X10*6/UL (ref 4.5–5.9)
RBC # BLD AUTO: 2.85 X10*6/UL (ref 4.5–5.9)
RBC # BLD AUTO: 2.99 X10*6/UL (ref 4.5–5.9)
RIGHT VENTRICLE FREE WALL PEAK S': 16.4 CM/S
RIGHT VENTRICLE PEAK SYSTOLIC PRESSURE: 33 MMHG
SAO2 % BLDA: 100 % (ref 94–100)
SAO2 % BLDA: 94 % (ref 94–100)
SAO2 % BLDA: 99 % (ref 94–100)
SAO2 % BLDA: 99 % (ref 94–100)
SCAN RESULT: ABNORMAL
SODIUM BLDA-SCNC: 133 MMOL/L (ref 136–145)
SODIUM BLDA-SCNC: 134 MMOL/L (ref 136–145)
SODIUM SERPL-SCNC: 135 MMOL/L (ref 136–145)
SODIUM SERPL-SCNC: 136 MMOL/L (ref 136–145)
SODIUM SERPL-SCNC: 137 MMOL/L (ref 136–145)
SODIUM SERPL-SCNC: 137 MMOL/L (ref 136–145)
TRICUSPID ANNULAR PLANE SYSTOLIC EXCURSION: 2.1 CM
UFH PPP CHRO-ACNC: 0.3 IU/ML
WBC # BLD AUTO: 13.3 X10*3/UL (ref 4.4–11.3)
WBC # BLD AUTO: 13.9 X10*3/UL (ref 4.4–11.3)
WBC # BLD AUTO: 15 X10*3/UL (ref 4.4–11.3)
WBC # BLD AUTO: 15 X10*3/UL (ref 4.4–11.3)
WBC # BLD AUTO: 16.2 X10*3/UL (ref 4.4–11.3)

## 2024-03-29 PROCEDURE — 82248 BILIRUBIN DIRECT: CPT

## 2024-03-29 PROCEDURE — 84132 ASSAY OF SERUM POTASSIUM: CPT | Performed by: NURSE PRACTITIONER

## 2024-03-29 PROCEDURE — 2500000004 HC RX 250 GENERAL PHARMACY W/ HCPCS (ALT 636 FOR OP/ED): Mod: JZ

## 2024-03-29 PROCEDURE — 82330 ASSAY OF CALCIUM: CPT

## 2024-03-29 PROCEDURE — P9047 ALBUMIN (HUMAN), 25%, 50ML: HCPCS | Mod: JZ

## 2024-03-29 PROCEDURE — 93325 DOPPLER ECHO COLOR FLOW MAPG: CPT

## 2024-03-29 PROCEDURE — 93325 DOPPLER ECHO COLOR FLOW MAPG: CPT | Performed by: INTERNAL MEDICINE

## 2024-03-29 PROCEDURE — 85025 COMPLETE CBC W/AUTO DIFF WBC: CPT | Performed by: NURSE PRACTITIONER

## 2024-03-29 PROCEDURE — 85610 PROTHROMBIN TIME: CPT | Performed by: NURSE PRACTITIONER

## 2024-03-29 PROCEDURE — 90945 DIALYSIS ONE EVALUATION: CPT | Performed by: INTERNAL MEDICINE

## 2024-03-29 PROCEDURE — 2020000001 HC ICU ROOM DAILY

## 2024-03-29 PROCEDURE — 85027 COMPLETE CBC AUTOMATED: CPT

## 2024-03-29 PROCEDURE — 85384 FIBRINOGEN ACTIVITY: CPT | Performed by: NURSE PRACTITIONER

## 2024-03-29 PROCEDURE — 2500000004 HC RX 250 GENERAL PHARMACY W/ HCPCS (ALT 636 FOR OP/ED): Performed by: STUDENT IN AN ORGANIZED HEALTH CARE EDUCATION/TRAINING PROGRAM

## 2024-03-29 PROCEDURE — 37799 UNLISTED PX VASCULAR SURGERY: CPT | Performed by: NURSE PRACTITIONER

## 2024-03-29 PROCEDURE — 2500000004 HC RX 250 GENERAL PHARMACY W/ HCPCS (ALT 636 FOR OP/ED)

## 2024-03-29 PROCEDURE — 99291 CRITICAL CARE FIRST HOUR: CPT

## 2024-03-29 PROCEDURE — 83735 ASSAY OF MAGNESIUM: CPT | Performed by: NURSE PRACTITIONER

## 2024-03-29 PROCEDURE — 85027 COMPLETE CBC AUTOMATED: CPT | Performed by: NURSE PRACTITIONER

## 2024-03-29 PROCEDURE — 93321 DOPPLER ECHO F-UP/LMTD STD: CPT | Performed by: INTERNAL MEDICINE

## 2024-03-29 PROCEDURE — 99291 CRITICAL CARE FIRST HOUR: CPT | Performed by: ANESTHESIOLOGY

## 2024-03-29 PROCEDURE — 82947 ASSAY GLUCOSE BLOOD QUANT: CPT

## 2024-03-29 PROCEDURE — 2500000001 HC RX 250 WO HCPCS SELF ADMINISTERED DRUGS (ALT 637 FOR MEDICARE OP)

## 2024-03-29 PROCEDURE — 99232 SBSQ HOSP IP/OBS MODERATE 35: CPT

## 2024-03-29 PROCEDURE — 85610 PROTHROMBIN TIME: CPT

## 2024-03-29 PROCEDURE — 85384 FIBRINOGEN ACTIVITY: CPT

## 2024-03-29 PROCEDURE — 2500000005 HC RX 250 GENERAL PHARMACY W/O HCPCS: Performed by: NURSE PRACTITIONER

## 2024-03-29 PROCEDURE — 94003 VENT MGMT INPAT SUBQ DAY: CPT

## 2024-03-29 PROCEDURE — 71045 X-RAY EXAM CHEST 1 VIEW: CPT | Performed by: RADIOLOGY

## 2024-03-29 PROCEDURE — 2500000004 HC RX 250 GENERAL PHARMACY W/ HCPCS (ALT 636 FOR OP/ED): Performed by: NURSE PRACTITIONER

## 2024-03-29 PROCEDURE — 93308 TTE F-UP OR LMTD: CPT | Performed by: INTERNAL MEDICINE

## 2024-03-29 PROCEDURE — 71045 X-RAY EXAM CHEST 1 VIEW: CPT

## 2024-03-29 PROCEDURE — 2500000004 HC RX 250 GENERAL PHARMACY W/ HCPCS (ALT 636 FOR OP/ED): Mod: JZ | Performed by: NURSE PRACTITIONER

## 2024-03-29 PROCEDURE — 85520 HEPARIN ASSAY: CPT

## 2024-03-29 PROCEDURE — 2500000005 HC RX 250 GENERAL PHARMACY W/O HCPCS

## 2024-03-29 RX ORDER — DEXMEDETOMIDINE HYDROCHLORIDE 4 UG/ML
.1-1.5 INJECTION, SOLUTION INTRAVENOUS CONTINUOUS
Status: DISCONTINUED | OUTPATIENT
Start: 2024-03-29 | End: 2024-03-30

## 2024-03-29 RX ORDER — ALBUMIN HUMAN 250 G/1000ML
25 SOLUTION INTRAVENOUS EVERY 6 HOURS
Status: COMPLETED | OUTPATIENT
Start: 2024-03-29 | End: 2024-03-30

## 2024-03-29 RX ORDER — NOREPINEPHRINE BITARTRATE/D5W 8 MG/250ML
.01-.5 PLASTIC BAG, INJECTION (ML) INTRAVENOUS CONTINUOUS
Status: DISCONTINUED | OUTPATIENT
Start: 2024-03-29 | End: 2024-04-01

## 2024-03-29 RX ADMIN — HEPARIN SODIUM 1600 UNITS/HR: 10000 INJECTION, SOLUTION INTRAVENOUS at 03:19

## 2024-03-29 RX ADMIN — PERFLUTREN 10 ML OF DILUTION: 6.52 INJECTION, SUSPENSION INTRAVENOUS at 11:36

## 2024-03-29 RX ADMIN — DEXAMETHASONE SODIUM PHOSPHATE 13.2 MG: 4 INJECTION, SOLUTION INTRA-ARTICULAR; INTRALESIONAL; INTRAMUSCULAR; INTRAVENOUS; SOFT TISSUE at 06:42

## 2024-03-29 RX ADMIN — HEPARIN SODIUM 1600 UNITS/HR: 10000 INJECTION, SOLUTION INTRAVENOUS at 20:47

## 2024-03-29 RX ADMIN — ESOMEPRAZOLE MAGNESIUM 40 MG: 40 FOR SUSPENSION ORAL at 06:42

## 2024-03-29 RX ADMIN — SODIUM PHOSPHATE, MONOBASIC, MONOHYDRATE AND SODIUM PHOSPHATE, DIBASIC, ANHYDROUS 15 MMOL: 142; 276 INJECTION, SOLUTION INTRAVENOUS at 09:55

## 2024-03-29 RX ADMIN — CALCIUM GLUCONATE 1 G: 20 INJECTION, SOLUTION INTRAVENOUS at 13:54

## 2024-03-29 RX ADMIN — NOREPINEPHRINE BITARTRATE 0.01 MCG/KG/MIN: 8 INJECTION, SOLUTION INTRAVENOUS at 11:14

## 2024-03-29 RX ADMIN — DEXAMETHASONE SODIUM PHOSPHATE 13.2 MG: 4 INJECTION, SOLUTION INTRA-ARTICULAR; INTRALESIONAL; INTRAMUSCULAR; INTRAVENOUS; SOFT TISSUE at 18:12

## 2024-03-29 RX ADMIN — DEXMEDETOMIDINE HYDROCHLORIDE 0.2 MCG/KG/HR: 400 INJECTION INTRAVENOUS at 11:14

## 2024-03-29 RX ADMIN — CALCIUM CHLORIDE, MAGNESIUM CHLORIDE, DEXTROSE MONOHYDRATE, LACTIC ACID, SODIUM CHLORIDE, SODIUM BICARBONATE AND POTASSIUM CHLORIDE 2400 ML/HR: 3.68; 3.05; 22; 5.4; 6.46; 3.09; .314 INJECTION INTRAVENOUS at 20:59

## 2024-03-29 RX ADMIN — THIAMINE HYDROCHLORIDE 100 MG: 100 INJECTION, SOLUTION INTRAMUSCULAR; INTRAVENOUS at 08:27

## 2024-03-29 RX ADMIN — DEXMEDETOMIDINE HYDROCHLORIDE 0.2 MCG/KG/HR: 400 INJECTION INTRAVENOUS at 22:23

## 2024-03-29 RX ADMIN — ALBUMIN HUMAN 25 G: 0.25 SOLUTION INTRAVENOUS at 20:47

## 2024-03-29 RX ADMIN — ALBUMIN HUMAN 25 G: 0.25 SOLUTION INTRAVENOUS at 08:26

## 2024-03-29 RX ADMIN — INSULIN HUMAN 3 UNITS/HR: 1 INJECTION, SOLUTION INTRAVENOUS at 06:35

## 2024-03-29 RX ADMIN — ALBUMIN HUMAN 25 G: 0.25 SOLUTION INTRAVENOUS at 13:50

## 2024-03-29 ASSESSMENT — PAIN - FUNCTIONAL ASSESSMENT
PAIN_FUNCTIONAL_ASSESSMENT: CPOT (CRITICAL CARE PAIN OBSERVATION TOOL)
PAIN_FUNCTIONAL_ASSESSMENT: 0-10

## 2024-03-29 ASSESSMENT — PAIN SCALES - GENERAL
PAINLEVEL_OUTOF10: 0 - NO PAIN

## 2024-03-29 NOTE — PROGRESS NOTES
"Spiritual Care Visit      visited with the patient's spouse in the family waiting room. Spouse reported they are going to try \"to trach and take the patient off the vent on Monday.\" She reported the patient has been awake and following what is going on. Spouse was grateful for the progress the patient made and continues to be well supported from her family. The had no other expressed need a this time.     Rev. Socrates Velasquez, Supportive Oncology                                                      "

## 2024-03-29 NOTE — PROGRESS NOTES
Jason Hollins is a 63 y.o. male on day 24 of admission presenting with Soft tissue sarcoma (CMS/HCC).    Subjective   Net negative 2.2L for past 24hrs on CVVH. Insulin infusion resumed overnight. Patient remains on levo at 0.01. Remains intubated off sedation. Continues to open eyes to verbal stimulation. Following commands with BUE and LLE.    Objective     Physical Exam  Vitals reviewed.   Constitutional:       Appearance: He is ill-appearing.      Comments: Intubated.   HENT:      Head:      Comments: Edematous face/head/neck     Mouth/Throat:      Mouth: Mucous membranes are moist.      Comments: ETT in place. OG tube in place with TFs infusing.   Eyes:      General: Scleral icterus present.      Pupils: Pupils are equal, round, and reactive to light.      Comments: Conjunctival edema. Subconjunctival hemorrhage.   Cardiovascular:      Rate and Rhythm: Normal rate and regular rhythm.      Pulses:           Radial pulses are 1+ on the right side and 1+ on the left side.        Dorsalis pedis pulses are 1+ on the right side and 1+ on the left side.      Heart sounds: Normal heart sounds.   Pulmonary:      Comments: Mechanically ventilated via ETT. Diminished breath sounds bilaterally. Equal chest rise. Current vent settings AC/VC 50%, 550, 24, +10.  Abdominal:      General: Abdomen is protuberant. Bowel sounds are absent. There is distension.      Palpations: Abdomen is soft.      Comments: Obese abdomen.   Genitourinary:     Comments: Penile/scrotal edema.  Musculoskeletal:      Comments: Generalized weakness.   Skin:     General: Skin is warm and dry.      Comments: Anasarca. Right flap site with wound VAC, no output. ANDERSON x 3 all with serosanguinous output.   Neurological:      GCS: GCS eye subscore is 3. GCS verbal subscore is 1. GCS motor subscore is 6.      Comments: Intubated. +cough and gag. Patient opening eyes to verbal stimulation and following simple commands. Weak bilateral hand grasps and wiggles toes in  "left foot. Unable to move RLE.        Last Recorded Vitals  Blood pressure 132/53, pulse 73, temperature 36.5 °C (97.7 °F), temperature source Esophageal, resp. rate (!) 30, height 1.778 m (5' 10\"), weight 129 kg (284 lb 9.8 oz), SpO2 90 %.    VS over last 24h  Heart Rate:  [71-90]   Temp:  [36.4 °C (97.5 °F)-36.8 °C (98.2 °F)]   Resp:  [12-39]   BP: (132)/(53)   Weight:  [129 kg (284 lb 9.8 oz)]   SpO2:  [90 %-100 %]    Intake/Output last 3 Shifts:  I/O last 3 completed shifts:  In: 2072.8 (16.1 mL/kg) [I.V.:897.8 (7 mL/kg); Blood:350; Other:135; NG/GT:540; IV Piggyback:150]  Out: 5535 (42.9 mL/kg) [Drains:250; Other:5285]  Weight: 129.1 kg       Intake/Output Summary (Last 24 hours) at 3/29/2024 1010  Last data filed at 3/29/2024 0900  Gross per 24 hour   Intake 1256.31 ml   Output 3851 ml   Net -2594.69 ml       Relevant Results  Results for orders placed or performed during the hospital encounter of 03/05/24 (from the past 24 hour(s))   POCT GLUCOSE   Result Value Ref Range    POCT Glucose 200 (H) 74 - 99 mg/dL   Type and screen   Result Value Ref Range    ABO TYPE O     Rh TYPE POS     ANTIBODY SCREEN NEG    CBC   Result Value Ref Range    WBC 16.2 (H) 4.4 - 11.3 x10*3/uL    nRBC 3.0 (H) 0.0 - 0.0 /100 WBCs    RBC 2.90 (L) 4.50 - 5.90 x10*6/uL    Hemoglobin 8.5 (L) 13.5 - 17.5 g/dL    Hematocrit 23.9 (L) 41.0 - 52.0 %    MCV 82 80 - 100 fL    MCH 29.3 26.0 - 34.0 pg    MCHC 35.6 32.0 - 36.0 g/dL    RDW 15.9 (H) 11.5 - 14.5 %    Platelets 51 (L) 150 - 450 x10*3/uL   Coagulation Screen   Result Value Ref Range    Protime 17.2 (H) 9.8 - 12.8 seconds    INR 1.5 (H) 0.9 - 1.1    aPTT 111 (HH) 27 - 38 seconds   Heparin Assay, UFH   Result Value Ref Range    Heparin Unfractionated 0.3 See Comment Below for Therapeutic Ranges IU/mL   Magnesium   Result Value Ref Range    Magnesium 2.64 (H) 1.60 - 2.40 mg/dL   Renal Function Panel   Result Value Ref Range    Glucose 210 (H) 74 - 99 mg/dL    Sodium 138 136 - 145 mmol/L "    Potassium 4.5 3.5 - 5.3 mmol/L    Chloride 102 98 - 107 mmol/L    Bicarbonate 22 21 - 32 mmol/L    Anion Gap 19 10 - 20 mmol/L    Urea Nitrogen 42 (H) 6 - 23 mg/dL    Creatinine 2.25 (H) 0.50 - 1.30 mg/dL    eGFR 32 (L) >60 mL/min/1.73m*2    Calcium 8.1 (L) 8.6 - 10.6 mg/dL    Phosphorus 2.7 2.5 - 4.9 mg/dL    Albumin 3.3 (L) 3.4 - 5.0 g/dL   Calcium, ionized   Result Value Ref Range    POCT Calcium, Ionized 1.06 (L) 1.1 - 1.33 mmol/L   Blood Gas Arterial Full Panel   Result Value Ref Range    POCT pH, Arterial 7.45 (H) 7.38 - 7.42 pH    POCT pCO2, Arterial 29 (L) 38 - 42 mm Hg    POCT pO2, Arterial 122 (H) 85 - 95 mm Hg    POCT SO2, Arterial 99 94 - 100 %    POCT Oxy Hemoglobin, Arterial 96.9 94.0 - 98.0 %    POCT Hematocrit Calculated, Arterial 27.0 (L) 41.0 - 52.0 %    POCT Sodium, Arterial 133 (L) 136 - 145 mmol/L    POCT Potassium, Arterial 4.4 3.5 - 5.3 mmol/L    POCT Chloride, Arterial 100 98 - 107 mmol/L    POCT Ionized Calcium, Arterial 1.11 1.10 - 1.33 mmol/L    POCT Glucose, Arterial 208 (H) 74 - 99 mg/dL    POCT Lactate, Arterial 2.3 (H) 0.4 - 2.0 mmol/L    POCT Base Excess, Arterial -3.1 (L) -2.0 - 3.0 mmol/L    POCT HCO3 Calculated, Arterial 20.2 (L) 22.0 - 26.0 mmol/L    POCT Hemoglobin, Arterial 8.9 (L) 13.5 - 17.5 g/dL    POCT Anion Gap, Arterial 17 10 - 25 mmo/L    Patient Temperature 37.0 degrees Celsius    FiO2 40 %   Fibrinogen   Result Value Ref Range    Fibrinogen 169 (L) 200 - 400 mg/dL   Ferritin   Result Value Ref Range    Ferritin 696 (H) 20 - 300 ng/mL   POCT GLUCOSE   Result Value Ref Range    POCT Glucose 238 (H) 74 - 99 mg/dL   Blood Gas Arterial Full Panel   Result Value Ref Range    POCT pH, Arterial 7.47 (H) 7.38 - 7.42 pH    POCT pCO2, Arterial 30 (L) 38 - 42 mm Hg    POCT pO2, Arterial 118 (H) 85 - 95 mm Hg    POCT SO2, Arterial 100 94 - 100 %    POCT Oxy Hemoglobin, Arterial 96.4 94.0 - 98.0 %    POCT Hematocrit Calculated, Arterial 26.0 (L) 41.0 - 52.0 %    POCT Sodium,  Arterial 133 (L) 136 - 145 mmol/L    POCT Potassium, Arterial 4.5 3.5 - 5.3 mmol/L    POCT Chloride, Arterial 102 98 - 107 mmol/L    POCT Ionized Calcium, Arterial 1.10 1.10 - 1.33 mmol/L    POCT Glucose, Arterial 253 (H) 74 - 99 mg/dL    POCT Lactate, Arterial 2.2 (H) 0.4 - 2.0 mmol/L    POCT Base Excess, Arterial -1.4 -2.0 - 3.0 mmol/L    POCT HCO3 Calculated, Arterial 21.8 (L) 22.0 - 26.0 mmol/L    POCT Hemoglobin, Arterial 8.8 (L) 13.5 - 17.5 g/dL    POCT Anion Gap, Arterial 14 10 - 25 mmo/L    Patient Temperature 37.0 degrees Celsius    FiO2 40 %   POCT GLUCOSE   Result Value Ref Range    POCT Glucose 223 (H) 74 - 99 mg/dL   POCT GLUCOSE   Result Value Ref Range    POCT Glucose 172 (H) 74 - 99 mg/dL   POCT GLUCOSE   Result Value Ref Range    POCT Glucose 166 (H) 74 - 99 mg/dL   CBC   Result Value Ref Range    WBC 16.2 (H) 4.4 - 11.3 x10*3/uL    nRBC 2.2 (H) 0.0 - 0.0 /100 WBCs    RBC 2.99 (L) 4.50 - 5.90 x10*6/uL    Hemoglobin 8.7 (L) 13.5 - 17.5 g/dL    Hematocrit 24.8 (L) 41.0 - 52.0 %    MCV 83 80 - 100 fL    MCH 29.1 26.0 - 34.0 pg    MCHC 35.1 32.0 - 36.0 g/dL    RDW 16.5 (H) 11.5 - 14.5 %    Platelets 57 (L) 150 - 450 x10*3/uL   Magnesium   Result Value Ref Range    Magnesium 2.71 (H) 1.60 - 2.40 mg/dL   Renal Function Panel   Result Value Ref Range    Glucose 171 (H) 74 - 99 mg/dL    Sodium 136 136 - 145 mmol/L    Potassium 4.2 3.5 - 5.3 mmol/L    Chloride 101 98 - 107 mmol/L    Bicarbonate 24 21 - 32 mmol/L    Anion Gap 15 10 - 20 mmol/L    Urea Nitrogen 48 (H) 6 - 23 mg/dL    Creatinine 2.16 (H) 0.50 - 1.30 mg/dL    eGFR 34 (L) >60 mL/min/1.73m*2    Calcium 8.5 (L) 8.6 - 10.6 mg/dL    Phosphorus 2.1 (L) 2.5 - 4.9 mg/dL    Albumin 3.4 3.4 - 5.0 g/dL   Fibrinogen   Result Value Ref Range    Fibrinogen 181 (L) 200 - 400 mg/dL   Blood Gas Arterial Full Panel   Result Value Ref Range    POCT pH, Arterial 7.48 (H) 7.38 - 7.42 pH    POCT pCO2, Arterial 31 (L) 38 - 42 mm Hg    POCT pO2, Arterial 111 (H) 85 -  95 mm Hg    POCT SO2, Arterial 99 94 - 100 %    POCT Oxy Hemoglobin, Arterial 97.0 94.0 - 98.0 %    POCT Hematocrit Calculated, Arterial 27.0 (L) 41.0 - 52.0 %    POCT Sodium, Arterial 134 (L) 136 - 145 mmol/L    POCT Potassium, Arterial 4.3 3.5 - 5.3 mmol/L    POCT Chloride, Arterial 101 98 - 107 mmol/L    POCT Ionized Calcium, Arterial 1.12 1.10 - 1.33 mmol/L    POCT Glucose, Arterial 168 (H) 74 - 99 mg/dL    POCT Lactate, Arterial 3.2 (H) 0.4 - 2.0 mmol/L    POCT Base Excess, Arterial 0.0 -2.0 - 3.0 mmol/L    POCT HCO3 Calculated, Arterial 23.1 22.0 - 26.0 mmol/L    POCT Hemoglobin, Arterial 9.0 (L) 13.5 - 17.5 g/dL    POCT Anion Gap, Arterial 14 10 - 25 mmo/L    Patient Temperature 37.0 degrees Celsius    FiO2 40 %   Blood Gas Arterial Full Panel   Result Value Ref Range    POCT pH, Arterial 7.46 (H) 7.38 - 7.42 pH    POCT pCO2, Arterial 32 (L) 38 - 42 mm Hg    POCT pO2, Arterial 113 (H) 85 - 95 mm Hg    POCT SO2, Arterial 99 94 - 100 %    POCT Oxy Hemoglobin, Arterial 96.2 94.0 - 98.0 %    POCT Hematocrit Calculated, Arterial 27.0 (L) 41.0 - 52.0 %    POCT Sodium, Arterial 134 (L) 136 - 145 mmol/L    POCT Potassium, Arterial 4.3 3.5 - 5.3 mmol/L    POCT Chloride, Arterial 102 98 - 107 mmol/L    POCT Ionized Calcium, Arterial 1.10 1.10 - 1.33 mmol/L    POCT Glucose, Arterial 149 (H) 74 - 99 mg/dL    POCT Lactate, Arterial 2.7 (H) 0.4 - 2.0 mmol/L    POCT Base Excess, Arterial -0.7 -2.0 - 3.0 mmol/L    POCT HCO3 Calculated, Arterial 22.8 22.0 - 26.0 mmol/L    POCT Hemoglobin, Arterial 9.0 (L) 13.5 - 17.5 g/dL    POCT Anion Gap, Arterial 14 10 - 25 mmo/L    Patient Temperature 37.0 degrees Celsius    FiO2 40 %   Heparin Assay, UFH   Result Value Ref Range    Heparin Unfractionated 0.3 See Comment Below for Therapeutic Ranges IU/mL   POCT GLUCOSE   Result Value Ref Range    POCT Glucose 143 (H) 74 - 99 mg/dL   POCT GLUCOSE   Result Value Ref Range    POCT Glucose 125 (H) 74 - 99 mg/dL   Blood Gas Arterial Full  Panel   Result Value Ref Range    POCT pH, Arterial 7.44 (H) 7.38 - 7.42 pH    POCT pCO2, Arterial 34 (L) 38 - 42 mm Hg    POCT pO2, Arterial 141 (H) 85 - 95 mm Hg    POCT SO2, Arterial 100 94 - 100 %    POCT Oxy Hemoglobin, Arterial 96.4 94.0 - 98.0 %    POCT Hematocrit Calculated, Arterial 26.0 (L) 41.0 - 52.0 %    POCT Sodium, Arterial 134 (L) 136 - 145 mmol/L    POCT Potassium, Arterial 4.2 3.5 - 5.3 mmol/L    POCT Chloride, Arterial 104 98 - 107 mmol/L    POCT Ionized Calcium, Arterial 1.09 (L) 1.10 - 1.33 mmol/L    POCT Glucose, Arterial 129 (H) 74 - 99 mg/dL    POCT Lactate, Arterial 2.1 (H) 0.4 - 2.0 mmol/L    POCT Base Excess, Arterial -0.8 -2.0 - 3.0 mmol/L    POCT HCO3 Calculated, Arterial 23.1 22.0 - 26.0 mmol/L    POCT Hemoglobin, Arterial 8.8 (L) 13.5 - 17.5 g/dL    POCT Anion Gap, Arterial 11 10 - 25 mmo/L    Patient Temperature 37.0 degrees Celsius    FiO2 40 %   CBC   Result Value Ref Range    WBC 15.0 (H) 4.4 - 11.3 x10*3/uL    nRBC 2.5 (H) 0.0 - 0.0 /100 WBCs    RBC 2.85 (L) 4.50 - 5.90 x10*6/uL    Hemoglobin 8.6 (L) 13.5 - 17.5 g/dL    Hematocrit 23.2 (L) 41.0 - 52.0 %    MCV 81 80 - 100 fL    MCH 30.2 26.0 - 34.0 pg    MCHC 37.1 (H) 32.0 - 36.0 g/dL    RDW 16.7 (H) 11.5 - 14.5 %    Platelets 46 (L) 150 - 450 x10*3/uL   Magnesium   Result Value Ref Range    Magnesium 2.66 (H) 1.60 - 2.40 mg/dL   Renal Function Panel   Result Value Ref Range    Glucose 142 (H) 74 - 99 mg/dL    Sodium 137 136 - 145 mmol/L    Potassium 4.4 3.5 - 5.3 mmol/L    Chloride 102 98 - 107 mmol/L    Bicarbonate 24 21 - 32 mmol/L    Anion Gap 15 10 - 20 mmol/L    Urea Nitrogen 49 (H) 6 - 23 mg/dL    Creatinine 2.04 (H) 0.50 - 1.30 mg/dL    eGFR 36 (L) >60 mL/min/1.73m*2    Calcium 8.3 (L) 8.6 - 10.6 mg/dL    Phosphorus 2.3 (L) 2.5 - 4.9 mg/dL    Albumin 3.3 (L) 3.4 - 5.0 g/dL   Blood Gas Arterial Full Panel   Result Value Ref Range    POCT pH, Arterial 7.48 (H) 7.38 - 7.42 pH    POCT pCO2, Arterial 31 (L) 38 - 42 mm Hg     POCT pO2, Arterial 134 (H) 85 - 95 mm Hg    POCT SO2, Arterial 100 94 - 100 %    POCT Oxy Hemoglobin, Arterial 97.0 94.0 - 98.0 %    POCT Hematocrit Calculated, Arterial 26.0 (L) 41.0 - 52.0 %    POCT Sodium, Arterial 133 (L) 136 - 145 mmol/L    POCT Potassium, Arterial 4.2 3.5 - 5.3 mmol/L    POCT Chloride, Arterial 104 98 - 107 mmol/L    POCT Ionized Calcium, Arterial 1.09 (L) 1.10 - 1.33 mmol/L    POCT Glucose, Arterial 135 (H) 74 - 99 mg/dL    POCT Lactate, Arterial 2.1 (H) 0.4 - 2.0 mmol/L    POCT Base Excess, Arterial -0.1 -2.0 - 3.0 mmol/L    POCT HCO3 Calculated, Arterial 23.1 22.0 - 26.0 mmol/L    POCT Hemoglobin, Arterial 8.8 (L) 13.5 - 17.5 g/dL    POCT Anion Gap, Arterial 10 10 - 25 mmo/L    Patient Temperature 37.0 degrees Celsius    FiO2 40 %   Hepatic function panel   Result Value Ref Range    Albumin 3.3 (L) 3.4 - 5.0 g/dL    Bilirubin, Total      Bilirubin, Direct      Alkaline Phosphatase      ALT      AST      Total Protein     Coagulation Screen   Result Value Ref Range    Protime 16.7 (H) 9.8 - 12.8 seconds    INR 1.5 (H) 0.9 - 1.1    aPTT 95 (H) 27 - 38 seconds   Fibrinogen   Result Value Ref Range    Fibrinogen 185 (L) 200 - 400 mg/dL   Calcium, ionized   Result Value Ref Range    POCT Calcium, Ionized 1.08 (L) 1.1 - 1.33 mmol/L   Blood Gas Arterial Full Panel   Result Value Ref Range    POCT pH, Arterial 7.45 (H) 7.38 - 7.42 pH    POCT pCO2, Arterial 32 (L) 38 - 42 mm Hg    POCT pO2, Arterial 59 (L) 85 - 95 mm Hg    POCT SO2, Arterial 94 94 - 100 %    POCT Oxy Hemoglobin, Arterial 90.6 (L) 94.0 - 98.0 %    POCT Hematocrit Calculated, Arterial 26.0 (L) 41.0 - 52.0 %    POCT Sodium, Arterial 134 (L) 136 - 145 mmol/L    POCT Potassium, Arterial 4.4 3.5 - 5.3 mmol/L    POCT Chloride, Arterial 103 98 - 107 mmol/L    POCT Ionized Calcium, Arterial 1.09 (L) 1.10 - 1.33 mmol/L    POCT Glucose, Arterial 176 (H) 74 - 99 mg/dL    POCT Lactate, Arterial 2.0 0.4 - 2.0 mmol/L    POCT Base Excess, Arterial  -1.4 -2.0 - 3.0 mmol/L    POCT HCO3 Calculated, Arterial 22.2 22.0 - 26.0 mmol/L    POCT Hemoglobin, Arterial 8.6 (L) 13.5 - 17.5 g/dL    POCT Anion Gap, Arterial 13 10 - 25 mmo/L    Patient Temperature 37.0 degrees Celsius    FiO2 40 %     Scheduled medications  albumin human, 25 g, intravenous, q6h  dexAMETHasone, 13.2 mg, intravenous, q12h  esomeprazole, 40 mg, nasogastric tube, Daily before breakfast  oxygen, , inhalation, Continuous - Inhalation  sodium phosphate, 15 mmol, intravenous, Once  thiamine, 100 mg, intravenous, Daily      Continuous medications  heparin, 0-4,000 Units/hr, Last Rate: 1,600 Units/hr (03/29/24 0900)  insulin regular infusion for cardiac surgery, 0-15 Units/hr, Last Rate: 2.5 Units/hr (03/29/24 0901)  lactated Ringer's, 5 mL/hr, Last Rate: 5 mL/hr (03/29/24 0900)  norepinephrine, 0.01-3 mcg/kg/min (Dosing Weight), Last Rate: 0.01 mcg/kg/min (03/29/24 0900)  PrismaSol 4/2.5, 2,400 mL/hr, Last Rate: 2,400 mL/hr (03/28/24 2040)      PRN medications  PRN medications: alteplase, calcium gluconate, calcium gluconate, dextrose **OR** glucagon, magnesium sulfate, magnesium sulfate            Assessment/Plan   Jason Hollins is a 63 y.o. 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 Hawkins and Dr. Smith on 3/5/24.  Post operative course was uncomplicated and patient was on RNF until 3/20 for prolonged bed rest to avoid hip flexion to maintain flap integrity.  Patient was on prophylactic lovenox while on bedrest.     3/20: Cardiopulmonary arrest s/p CPR with ROSC after TPA, overnight started on heparin, epo, increased pressor requirements, increased swelling at flap site.     3/21: Hemorrhagic shock, MTP. Firm and large right flap site. Return to OR s/p Exploration of right thigh wound, Evacuation of  hematoma. Suture ligation of multiple bleeding vessel and several points in gluteal area.     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 Hawkins and Dr. Smith on 3/5/24 s/p cardiopulmonary arrest with ROSC 3/20. Documented to have followed commands off sedation. CT head 3/22 without acute process. Weaned off cisatracurium and propofol overnight 3/23-3/24. Ketamine weaned off 3/25 and Fentanyl weaned off 3/26 am. Repeat CT Head 3/26 without acute process. Neuro exam 3/27 unchanged: Positive cough and gag on exam. PERRL. Unable to arouse. Not withdrawing to noxious stimuli. Video EEG with evidence of severe diffuse encephalopathy. Neuro exam now improving 3/28: Patient opening eyes to verbal stimulation and following simple commands. Weak bilateral hand grasps and wiggles toes in left foot. Unable to move RLE.   - ongoing neuro and pain assessments  - add precedex, titrate to goal RASS 0 to -2  - encephalopathy labs -> thiamine pending (ammonia and TSH WNL)  - PT/OT -> PROM  - no need for soft wrist restraints at this time -> continue to reassess     CV: History of HTN, HLD. Acute cardiopulmonary arrest 2/2 to possible massive PE vs massive STEMI, received multiple rounds of CPR and one defibrillation.  Sustained ROSC achieved after TPA bolus. On high dose levophed, epinephrine, vaso, angioT2. Plan on 3/21 was for ECMO which was aborted secondary to large hemhorrge at right flap site. Had MTP and taken OR with 5L evacuated. ECHO 3/21: Normal LV, Mod enlarged RV, slight suggestion of a Townsend's sign / RV strain, low normal RV function. ECHO 3/22 with hyperdynamic LV. New onset Afib with RVR overnight 3/22-3/23, dosed with 150mg amio x2, started infusion at 1mg/min thereafter. AT2 weaned off. Amio infusion discontinued  3/25. Lactate downtrending. Vaso and epi weaned off. Remains in NSR. iEpo weaned off 3/27. Lactate downtrending. Levo at 0.01.  - continuous EKG/abp/cvp monitoring  - Goal map range 65-90  - wean levo as tolerated  - SDS switched to decadron 3/28 -> see heme  - continue heparin infusion  - continue to trend lactate  - continue aggressive fluid removal with CVVH as hemodynamics permit  - repeat TTE -> essentially unchanged     PULM: Arrived to SICU intubated julio c-CPR. Concern for massive PE. Started on iEpo, currently on 0.05. Hypoxic respiratory failure. Severe ARDS. ETT exchanged 3/23. CT Chest 3/26 with interval JOHN consolidative/ground glass opacity. Currently on AC/VC 50%, 550, 24, +10.   - ARDSnet lung protective strategies  - Wean FiO2 as tolerated  - keep peep at 10 today  - ABGs prn  - additional pulm toilet prn  - daily CXR  - ENT consulted for trach placement -> OR Monday 4/1    ENT: Had epistaxis 3/23, completed afrin bid x3 days  - monitor, low threshold to consult ENT  - avoid NG placement for now     GI: NPO. Shock liver, pancreatitis. Elevated TG. Elevated lactate. Consulted ACS for potential bowel ischemia as cause of persistent lactic acidosis. CT imaging 3/22 with evidence of pancreatitis. No acute surgical indication per ACS. RUQ US 3/22 with thickening of GB wall without cholelithiasis. CT A/P 3/26 negative for acute bleed. LFTs downtrending. Worsening hyperbilirubinemia. Amylase and lipase now WNL.   - continue to increase TFs to goal 45ml/hr  - prostat daily  - PPI for GI ppx  - Trend LFTs daily  - IV thiamine 100mg IV x7 days     : No history of renal disease. Baseline creatinine 0.6. Anuric RUTH. Elevated CK, downtrending. Metabolic acidosis, total body fluid overload, hyperkalemia. Started on CVVH 3/21, stopped bicarb infusion 3/22. Marino removed 3/24. Hyponatremia. Net negative 2.5L for past 24hrs.  - Nephrology following, appreciate recs  - continue CVVH -> goal negative 3-4L  - Albumin  25% q6h x4 doses  - RFP BID and PRN  - Replete electrolytes judiciously  - Daily bladder scan     HEME: Patient was on ppx lovenox for DVT prophylaxis prior to cardiopulmonary arrest. Concern for massive PE patient received bolus of TPA during CPR. Started on heparin infusion overnight given concern for PE. Hemorrhagic shock 3/21, MTP and back to OR s/p Exploration of right thigh woundEvacuation of hematoma. Suture ligation of multiple bleeding vessel and several points in gluteal area. Hematology consulted 3/22 to r/o HLH. Transfused 1 RBC overnight 3/22-3/23. Thrombocytopenia, coagulapathy, hypofibrinogenemia. LE Duplex 3/25 +acute occlusive R soleal DVT. Received 2u PRBC and 1u FFP on 3/26. Heparin infusion discontinued 3/26 to r/o HIT and transitioned to bival. PF4 negative, resumed heparin infuision 3/27. Received 1u PRBC 3/27 evening.  - cbc, coags bid and prn  - fibrinogen daily  - continue heaprin infusion  - hold off on vitamin K for now  - ongoing monitoring for s/s bleeding  - close surveillance of RLE and drains  - F/U heme recs -> NK function test pending  - continue Decadron 13mg BID for suspicion of HLH (will need slow wean when ready)  - Vasc Med following, appreciate recs  - maintain active T&S (3/28)    ENDO: History of DM2. A1c 7.5%. TSH WNL 1/2024. Insulin infusion discontinued overnight.  - q4h BG checks and SSI     ID: Afebrile. Leukocytosis. On broad antimicrobial therapy. MRSA screen + 2/28/24. Pan cultures sent 3/22. Sputum NTF, +MRSA screen (completed 5 day course mupirocin), UCx and BCx negative. Completed 7 day course vanc/zosyn 3/27.   - temp q4h, wbc daily  - ongoing monitoring for s/s infection    Lines:  - right brachial arterial line (3/20)  - RIJ trialysis (3/27)  - left subclavian MAC with mini MAC (3/20)  - PIV x1     Dispo: Continue ICU care. Patient seen and discussed with ICU attending Dr. Xiong and ICU fellow Dr. Higgins.      Remy Peter, PHILLIP-CNP  SICU phone  [phone]    Critical care time: 50 minutes

## 2024-03-29 NOTE — PROGRESS NOTES
"  Department of Plastic and Reconstructive Surgery  Daily Progress Note    Patient Name: Jason Hollins  MRN: [id number]  Date:  03/29/24     Subjective  Patient remains critically ill in CTICU, intubated with CRRT. Pt able to follow commands weakly, responds to verbal stimuli and tracks across room with eyes. Low dose norepi currently running 2/2 fluid removal from CRRT. Hgb remains stable    Overnight Events  NAOE. 24 hr EEG completed last evening, indicative of severe diffuse encephalopathy. Negative for epileptiform abnormalities.     Objective    Vital Signs  /53 Comment: post: 126/50  Pulse 73   Temp 36.5 °C (97.7 °F) (Esophageal)   Resp (!) 30   Ht 1.778 m (5' 10\")   Wt 129 kg (284 lb 9.8 oz)   SpO2 90%   BMI 40.84 kg/m²      Physical Exam   Constitutional: Intubated, sedation weaned off. Able to squeeze hands on command, wiggle toes, opens eyes to verbal stimuli and tracks around room.   ENMT: MMM, ETT in place, OG tube in place  Cardiovascular: RRR on telemetry monitor  Respiratory/Thorax: ETT in place with ventilator support 40% FiO2  Gastrointestinal: abdomen soft, non distended  Genitourinary: CVVHD currently running   Musculoskeletal: able to squeeze hands, wiggle toes, purposeful movement appreciated  Extremities: Generalized pitting edema, Right 4Fr CFA and 7Fr CFV sheath in place  RLE: right thigh edematous, large, no bruising noted, soft and compressible, incisional wound vac in place, holding suction at -75 mmHG, no leak or alarm present, ANDERSON drain x 3 with dark serous output  BLE neurovascular intact, cap refill < 2 sec, +df/pf, DP/PT/ radial pulses 2+, no drainage noted.   Neurological:  HANNAH. intubated   Psychological: HANNAH, intubated  Skin: Warm, dry, intact  Diagnostics   Results for orders placed or performed during the hospital encounter of 03/05/24 (from the past 24 hour(s))   POCT GLUCOSE   Result Value Ref Range    POCT Glucose 200 (H) 74 - 99 mg/dL   Type and screen   Result Value Ref " Range    ABO TYPE O     Rh TYPE POS     ANTIBODY SCREEN NEG    CBC   Result Value Ref Range    WBC 16.2 (H) 4.4 - 11.3 x10*3/uL    nRBC 3.0 (H) 0.0 - 0.0 /100 WBCs    RBC 2.90 (L) 4.50 - 5.90 x10*6/uL    Hemoglobin 8.5 (L) 13.5 - 17.5 g/dL    Hematocrit 23.9 (L) 41.0 - 52.0 %    MCV 82 80 - 100 fL    MCH 29.3 26.0 - 34.0 pg    MCHC 35.6 32.0 - 36.0 g/dL    RDW 15.9 (H) 11.5 - 14.5 %    Platelets 51 (L) 150 - 450 x10*3/uL   Coagulation Screen   Result Value Ref Range    Protime 17.2 (H) 9.8 - 12.8 seconds    INR 1.5 (H) 0.9 - 1.1    aPTT 111 (HH) 27 - 38 seconds   Heparin Assay, UFH   Result Value Ref Range    Heparin Unfractionated 0.3 See Comment Below for Therapeutic Ranges IU/mL   Magnesium   Result Value Ref Range    Magnesium 2.64 (H) 1.60 - 2.40 mg/dL   Renal Function Panel   Result Value Ref Range    Glucose 210 (H) 74 - 99 mg/dL    Sodium 138 136 - 145 mmol/L    Potassium 4.5 3.5 - 5.3 mmol/L    Chloride 102 98 - 107 mmol/L    Bicarbonate 22 21 - 32 mmol/L    Anion Gap 19 10 - 20 mmol/L    Urea Nitrogen 42 (H) 6 - 23 mg/dL    Creatinine 2.25 (H) 0.50 - 1.30 mg/dL    eGFR 32 (L) >60 mL/min/1.73m*2    Calcium 8.1 (L) 8.6 - 10.6 mg/dL    Phosphorus 2.7 2.5 - 4.9 mg/dL    Albumin 3.3 (L) 3.4 - 5.0 g/dL   Calcium, ionized   Result Value Ref Range    POCT Calcium, Ionized 1.06 (L) 1.1 - 1.33 mmol/L   Blood Gas Arterial Full Panel   Result Value Ref Range    POCT pH, Arterial 7.45 (H) 7.38 - 7.42 pH    POCT pCO2, Arterial 29 (L) 38 - 42 mm Hg    POCT pO2, Arterial 122 (H) 85 - 95 mm Hg    POCT SO2, Arterial 99 94 - 100 %    POCT Oxy Hemoglobin, Arterial 96.9 94.0 - 98.0 %    POCT Hematocrit Calculated, Arterial 27.0 (L) 41.0 - 52.0 %    POCT Sodium, Arterial 133 (L) 136 - 145 mmol/L    POCT Potassium, Arterial 4.4 3.5 - 5.3 mmol/L    POCT Chloride, Arterial 100 98 - 107 mmol/L    POCT Ionized Calcium, Arterial 1.11 1.10 - 1.33 mmol/L    POCT Glucose, Arterial 208 (H) 74 - 99 mg/dL    POCT Lactate, Arterial 2.3 (H)  0.4 - 2.0 mmol/L    POCT Base Excess, Arterial -3.1 (L) -2.0 - 3.0 mmol/L    POCT HCO3 Calculated, Arterial 20.2 (L) 22.0 - 26.0 mmol/L    POCT Hemoglobin, Arterial 8.9 (L) 13.5 - 17.5 g/dL    POCT Anion Gap, Arterial 17 10 - 25 mmo/L    Patient Temperature 37.0 degrees Celsius    FiO2 40 %   Fibrinogen   Result Value Ref Range    Fibrinogen 169 (L) 200 - 400 mg/dL   Ferritin   Result Value Ref Range    Ferritin 696 (H) 20 - 300 ng/mL   POCT GLUCOSE   Result Value Ref Range    POCT Glucose 238 (H) 74 - 99 mg/dL   Blood Gas Arterial Full Panel   Result Value Ref Range    POCT pH, Arterial 7.47 (H) 7.38 - 7.42 pH    POCT pCO2, Arterial 30 (L) 38 - 42 mm Hg    POCT pO2, Arterial 118 (H) 85 - 95 mm Hg    POCT SO2, Arterial 100 94 - 100 %    POCT Oxy Hemoglobin, Arterial 96.4 94.0 - 98.0 %    POCT Hematocrit Calculated, Arterial 26.0 (L) 41.0 - 52.0 %    POCT Sodium, Arterial 133 (L) 136 - 145 mmol/L    POCT Potassium, Arterial 4.5 3.5 - 5.3 mmol/L    POCT Chloride, Arterial 102 98 - 107 mmol/L    POCT Ionized Calcium, Arterial 1.10 1.10 - 1.33 mmol/L    POCT Glucose, Arterial 253 (H) 74 - 99 mg/dL    POCT Lactate, Arterial 2.2 (H) 0.4 - 2.0 mmol/L    POCT Base Excess, Arterial -1.4 -2.0 - 3.0 mmol/L    POCT HCO3 Calculated, Arterial 21.8 (L) 22.0 - 26.0 mmol/L    POCT Hemoglobin, Arterial 8.8 (L) 13.5 - 17.5 g/dL    POCT Anion Gap, Arterial 14 10 - 25 mmo/L    Patient Temperature 37.0 degrees Celsius    FiO2 40 %   POCT GLUCOSE   Result Value Ref Range    POCT Glucose 223 (H) 74 - 99 mg/dL   POCT GLUCOSE   Result Value Ref Range    POCT Glucose 172 (H) 74 - 99 mg/dL   POCT GLUCOSE   Result Value Ref Range    POCT Glucose 166 (H) 74 - 99 mg/dL   CBC   Result Value Ref Range    WBC 16.2 (H) 4.4 - 11.3 x10*3/uL    nRBC 2.2 (H) 0.0 - 0.0 /100 WBCs    RBC 2.99 (L) 4.50 - 5.90 x10*6/uL    Hemoglobin 8.7 (L) 13.5 - 17.5 g/dL    Hematocrit 24.8 (L) 41.0 - 52.0 %    MCV 83 80 - 100 fL    MCH 29.1 26.0 - 34.0 pg    MCHC 35.1 32.0  - 36.0 g/dL    RDW 16.5 (H) 11.5 - 14.5 %    Platelets 57 (L) 150 - 450 x10*3/uL   Magnesium   Result Value Ref Range    Magnesium 2.71 (H) 1.60 - 2.40 mg/dL   Renal Function Panel   Result Value Ref Range    Glucose 171 (H) 74 - 99 mg/dL    Sodium 136 136 - 145 mmol/L    Potassium 4.2 3.5 - 5.3 mmol/L    Chloride 101 98 - 107 mmol/L    Bicarbonate 24 21 - 32 mmol/L    Anion Gap 15 10 - 20 mmol/L    Urea Nitrogen 48 (H) 6 - 23 mg/dL    Creatinine 2.16 (H) 0.50 - 1.30 mg/dL    eGFR 34 (L) >60 mL/min/1.73m*2    Calcium 8.5 (L) 8.6 - 10.6 mg/dL    Phosphorus 2.1 (L) 2.5 - 4.9 mg/dL    Albumin 3.4 3.4 - 5.0 g/dL   Fibrinogen   Result Value Ref Range    Fibrinogen 181 (L) 200 - 400 mg/dL   Blood Gas Arterial Full Panel   Result Value Ref Range    POCT pH, Arterial 7.48 (H) 7.38 - 7.42 pH    POCT pCO2, Arterial 31 (L) 38 - 42 mm Hg    POCT pO2, Arterial 111 (H) 85 - 95 mm Hg    POCT SO2, Arterial 99 94 - 100 %    POCT Oxy Hemoglobin, Arterial 97.0 94.0 - 98.0 %    POCT Hematocrit Calculated, Arterial 27.0 (L) 41.0 - 52.0 %    POCT Sodium, Arterial 134 (L) 136 - 145 mmol/L    POCT Potassium, Arterial 4.3 3.5 - 5.3 mmol/L    POCT Chloride, Arterial 101 98 - 107 mmol/L    POCT Ionized Calcium, Arterial 1.12 1.10 - 1.33 mmol/L    POCT Glucose, Arterial 168 (H) 74 - 99 mg/dL    POCT Lactate, Arterial 3.2 (H) 0.4 - 2.0 mmol/L    POCT Base Excess, Arterial 0.0 -2.0 - 3.0 mmol/L    POCT HCO3 Calculated, Arterial 23.1 22.0 - 26.0 mmol/L    POCT Hemoglobin, Arterial 9.0 (L) 13.5 - 17.5 g/dL    POCT Anion Gap, Arterial 14 10 - 25 mmo/L    Patient Temperature 37.0 degrees Celsius    FiO2 40 %   Blood Gas Arterial Full Panel   Result Value Ref Range    POCT pH, Arterial 7.46 (H) 7.38 - 7.42 pH    POCT pCO2, Arterial 32 (L) 38 - 42 mm Hg    POCT pO2, Arterial 113 (H) 85 - 95 mm Hg    POCT SO2, Arterial 99 94 - 100 %    POCT Oxy Hemoglobin, Arterial 96.2 94.0 - 98.0 %    POCT Hematocrit Calculated, Arterial 27.0 (L) 41.0 - 52.0 %     POCT Sodium, Arterial 134 (L) 136 - 145 mmol/L    POCT Potassium, Arterial 4.3 3.5 - 5.3 mmol/L    POCT Chloride, Arterial 102 98 - 107 mmol/L    POCT Ionized Calcium, Arterial 1.10 1.10 - 1.33 mmol/L    POCT Glucose, Arterial 149 (H) 74 - 99 mg/dL    POCT Lactate, Arterial 2.7 (H) 0.4 - 2.0 mmol/L    POCT Base Excess, Arterial -0.7 -2.0 - 3.0 mmol/L    POCT HCO3 Calculated, Arterial 22.8 22.0 - 26.0 mmol/L    POCT Hemoglobin, Arterial 9.0 (L) 13.5 - 17.5 g/dL    POCT Anion Gap, Arterial 14 10 - 25 mmo/L    Patient Temperature 37.0 degrees Celsius    FiO2 40 %   Heparin Assay, UFH   Result Value Ref Range    Heparin Unfractionated 0.3 See Comment Below for Therapeutic Ranges IU/mL   POCT GLUCOSE   Result Value Ref Range    POCT Glucose 143 (H) 74 - 99 mg/dL   POCT GLUCOSE   Result Value Ref Range    POCT Glucose 125 (H) 74 - 99 mg/dL   Blood Gas Arterial Full Panel   Result Value Ref Range    POCT pH, Arterial 7.44 (H) 7.38 - 7.42 pH    POCT pCO2, Arterial 34 (L) 38 - 42 mm Hg    POCT pO2, Arterial 141 (H) 85 - 95 mm Hg    POCT SO2, Arterial 100 94 - 100 %    POCT Oxy Hemoglobin, Arterial 96.4 94.0 - 98.0 %    POCT Hematocrit Calculated, Arterial 26.0 (L) 41.0 - 52.0 %    POCT Sodium, Arterial 134 (L) 136 - 145 mmol/L    POCT Potassium, Arterial 4.2 3.5 - 5.3 mmol/L    POCT Chloride, Arterial 104 98 - 107 mmol/L    POCT Ionized Calcium, Arterial 1.09 (L) 1.10 - 1.33 mmol/L    POCT Glucose, Arterial 129 (H) 74 - 99 mg/dL    POCT Lactate, Arterial 2.1 (H) 0.4 - 2.0 mmol/L    POCT Base Excess, Arterial -0.8 -2.0 - 3.0 mmol/L    POCT HCO3 Calculated, Arterial 23.1 22.0 - 26.0 mmol/L    POCT Hemoglobin, Arterial 8.8 (L) 13.5 - 17.5 g/dL    POCT Anion Gap, Arterial 11 10 - 25 mmo/L    Patient Temperature 37.0 degrees Celsius    FiO2 40 %   CBC   Result Value Ref Range    WBC 15.0 (H) 4.4 - 11.3 x10*3/uL    nRBC 2.5 (H) 0.0 - 0.0 /100 WBCs    RBC 2.85 (L) 4.50 - 5.90 x10*6/uL    Hemoglobin 8.6 (L) 13.5 - 17.5 g/dL     Hematocrit 23.2 (L) 41.0 - 52.0 %    MCV 81 80 - 100 fL    MCH 30.2 26.0 - 34.0 pg    MCHC 37.1 (H) 32.0 - 36.0 g/dL    RDW 16.7 (H) 11.5 - 14.5 %    Platelets 46 (L) 150 - 450 x10*3/uL   Magnesium   Result Value Ref Range    Magnesium 2.66 (H) 1.60 - 2.40 mg/dL   Renal Function Panel   Result Value Ref Range    Glucose 142 (H) 74 - 99 mg/dL    Sodium 137 136 - 145 mmol/L    Potassium 4.4 3.5 - 5.3 mmol/L    Chloride 102 98 - 107 mmol/L    Bicarbonate 24 21 - 32 mmol/L    Anion Gap 15 10 - 20 mmol/L    Urea Nitrogen 49 (H) 6 - 23 mg/dL    Creatinine 2.04 (H) 0.50 - 1.30 mg/dL    eGFR 36 (L) >60 mL/min/1.73m*2    Calcium 8.3 (L) 8.6 - 10.6 mg/dL    Phosphorus 2.3 (L) 2.5 - 4.9 mg/dL    Albumin 3.3 (L) 3.4 - 5.0 g/dL   Blood Gas Arterial Full Panel   Result Value Ref Range    POCT pH, Arterial 7.48 (H) 7.38 - 7.42 pH    POCT pCO2, Arterial 31 (L) 38 - 42 mm Hg    POCT pO2, Arterial 134 (H) 85 - 95 mm Hg    POCT SO2, Arterial 100 94 - 100 %    POCT Oxy Hemoglobin, Arterial 97.0 94.0 - 98.0 %    POCT Hematocrit Calculated, Arterial 26.0 (L) 41.0 - 52.0 %    POCT Sodium, Arterial 133 (L) 136 - 145 mmol/L    POCT Potassium, Arterial 4.2 3.5 - 5.3 mmol/L    POCT Chloride, Arterial 104 98 - 107 mmol/L    POCT Ionized Calcium, Arterial 1.09 (L) 1.10 - 1.33 mmol/L    POCT Glucose, Arterial 135 (H) 74 - 99 mg/dL    POCT Lactate, Arterial 2.1 (H) 0.4 - 2.0 mmol/L    POCT Base Excess, Arterial -0.1 -2.0 - 3.0 mmol/L    POCT HCO3 Calculated, Arterial 23.1 22.0 - 26.0 mmol/L    POCT Hemoglobin, Arterial 8.8 (L) 13.5 - 17.5 g/dL    POCT Anion Gap, Arterial 10 10 - 25 mmo/L    Patient Temperature 37.0 degrees Celsius    FiO2 40 %   Hepatic function panel   Result Value Ref Range    Albumin 3.3 (L) 3.4 - 5.0 g/dL    Bilirubin, Total      Bilirubin, Direct      Alkaline Phosphatase      ALT      AST      Total Protein     Coagulation Screen   Result Value Ref Range    Protime 16.7 (H) 9.8 - 12.8 seconds    INR 1.5 (H) 0.9 - 1.1     aPTT 95 (H) 27 - 38 seconds   Fibrinogen   Result Value Ref Range    Fibrinogen 185 (L) 200 - 400 mg/dL   Calcium, ionized   Result Value Ref Range    POCT Calcium, Ionized 1.08 (L) 1.1 - 1.33 mmol/L   Blood Gas Arterial Full Panel   Result Value Ref Range    POCT pH, Arterial 7.45 (H) 7.38 - 7.42 pH    POCT pCO2, Arterial 32 (L) 38 - 42 mm Hg    POCT pO2, Arterial 59 (L) 85 - 95 mm Hg    POCT SO2, Arterial 94 94 - 100 %    POCT Oxy Hemoglobin, Arterial 90.6 (L) 94.0 - 98.0 %    POCT Hematocrit Calculated, Arterial 26.0 (L) 41.0 - 52.0 %    POCT Sodium, Arterial 134 (L) 136 - 145 mmol/L    POCT Potassium, Arterial 4.4 3.5 - 5.3 mmol/L    POCT Chloride, Arterial 103 98 - 107 mmol/L    POCT Ionized Calcium, Arterial 1.09 (L) 1.10 - 1.33 mmol/L    POCT Glucose, Arterial 176 (H) 74 - 99 mg/dL    POCT Lactate, Arterial 2.0 0.4 - 2.0 mmol/L    POCT Base Excess, Arterial -1.4 -2.0 - 3.0 mmol/L    POCT HCO3 Calculated, Arterial 22.2 22.0 - 26.0 mmol/L    POCT Hemoglobin, Arterial 8.6 (L) 13.5 - 17.5 g/dL    POCT Anion Gap, Arterial 13 10 - 25 mmo/L    Patient Temperature 37.0 degrees Celsius    FiO2 40 %     Current Medications  Scheduled medications  albumin human, 25 g, intravenous, q6h  dexAMETHasone, 13.2 mg, intravenous, q12h  esomeprazole, 40 mg, nasogastric tube, Daily before breakfast  oxygen, , inhalation, Continuous - Inhalation  sodium phosphate, 15 mmol, intravenous, Once  thiamine, 100 mg, intravenous, Daily      Continuous medications  heparin, 0-4,000 Units/hr, Last Rate: 1,600 Units/hr (03/29/24 0900)  insulin regular infusion for cardiac surgery, 0-15 Units/hr, Last Rate: 2.5 Units/hr (03/29/24 0901)  lactated Ringer's, 5 mL/hr, Last Rate: 5 mL/hr (03/29/24 0900)  norepinephrine, 0.01-3 mcg/kg/min (Dosing Weight), Last Rate: 0.01 mcg/kg/min (03/29/24 0900)  PrismaSol 4/2.5, 2,400 mL/hr, Last Rate: 2,400 mL/hr (03/28/24 2040)      PRN medications  PRN medications: alteplase, calcium gluconate, calcium  gluconate, dextrose **OR** glucagon, magnesium sulfate, magnesium sulfate     Assessment  Jason Hollins 63 y.o. 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 Hawkins and Dr. Smith on 3/5/24.  Post operative course was uncomplicated and patient was on RNF until 3/20 for prolonged bed rest to avoid hip flexion to maintain flap integrity.  Patient was on prophylactic lovenox while on bedrest.    3/20: Now s/p CPR with ROSC after TPA for assumed large PE, overnight started on heparin, epo, increased pressor requirements, increased swelling at flap site.   3/21: MTP transfusions. Return to OR for exploration of R Flap site now s/p Exploration of right thigh wound evacuation of hematoma. Suture ligation of multiple bleeding vessel and several points in gluteal area with Dr. Smith.  3/26: 3 drains in place with continued output but output is slowly downtrending, wound vac remains with no output    Plan/Recommendations  S/p Exploration of right thigh wound evacuation of hematoma 3/21. Suture ligation of multiple bleeding vessel and several points in gluteal area:  A/P:   - Remains critically ill in CTICU       ·  remains intubated, without sedation       ·  Levophed resumed PM 3/27       ·  CRRT       ·  Heparin gtt resumed 3/27 after HIIT testing negative  - Maintain incisional wound vac to right gluteal/thigh area x10-14 days post-op (3/30-4/3)       ·  Settings: -75mmHg low continuous suction        ·  Notify plastic surgery with any concerns of leak or obstruction   - Monitor right thigh for worsening s/s of active bleeding  - Continue ANDERSON drain care per nursing      ·  Strip drain tubing TID and PRN     ·  Monitor and record output q8h      ·  Keep drain sites C/D/I with daily drain dressing changes      ·  Notify plastics if  ANDERSON drain output exceeds > 100 ml/hr in one drain or > 300 ml/hr collectively     ·  No acute signs of bleeding   - Operative findings 3/21: Actively Bleeding Gluteal Arterial Perforators, Generalized Oozing, healthy Flap, EBL 6.5L (3L of which was retained Hematoma)   - Appreciate remaining care per CTICU/SICU  - Plastics to follow     PHILLIP Dalal-CNP   Plastic and Reconstructive Surgery   [place]  Pager #[phone]  Team phones: [phone]

## 2024-03-29 NOTE — PROGRESS NOTES
Jason Hollins is a 63 y.o. male on day 24 of admission presenting with Soft tissue sarcoma (CMS/HCC).    Subjective   Net negative 2.2L for past 24hrs on CVVH. Insulin infusion resumed overnight. Patient remains on levo at 0.01. Remains intubated off sedation. Continues to open eyes to verbal stimulation. Following commands with BUE and LLE.    Objective     Physical Exam  Vitals reviewed.   Constitutional:       Appearance: He is ill-appearing.      Comments: Intubated.   HENT:      Head:      Comments: Edematous face/head/neck     Mouth/Throat:      Mouth: Mucous membranes are moist.      Comments: ETT in place. OG tube in place with TFs infusing.   Eyes:      General: Scleral icterus present.      Pupils: Pupils are equal, round, and reactive to light.      Comments: Conjunctival edema. Subconjunctival hemorrhage.   Cardiovascular:      Rate and Rhythm: Normal rate and regular rhythm.      Pulses:           Radial pulses are 1+ on the right side and 1+ on the left side.        Dorsalis pedis pulses are 1+ on the right side and 1+ on the left side.      Heart sounds: Normal heart sounds.   Pulmonary:      Comments: Mechanically ventilated via ETT. Diminished breath sounds bilaterally. Equal chest rise. Current vent settings AC/VC 50%, 550, 24, +10.  Abdominal:      General: Abdomen is protuberant. Bowel sounds are absent. There is distension.      Palpations: Abdomen is soft.      Comments: Obese abdomen.   Genitourinary:     Comments: Penile/scrotal edema.  Musculoskeletal:      Comments: Generalized weakness.   Skin:     General: Skin is warm and dry.      Comments: Anasarca. Right flap site with wound VAC, no output. ANDERSON x 3 all with serosanguinous output.   Neurological:      GCS: GCS eye subscore is 3. GCS verbal subscore is 1. GCS motor subscore is 6.      Comments: Intubated. +cough and gag. Patient opening eyes to verbal stimulation and following simple commands. Weak bilateral hand grasps and wiggles toes in  "left foot. Unable to move RLE.        Last Recorded Vitals  Blood pressure 132/53, pulse 66, temperature 36.8 °C (98.2 °F), temperature source Esophageal, resp. rate 21, height 1.778 m (5' 10\"), weight 129 kg (284 lb 9.8 oz), SpO2 97 %.    VS over last 24h  Heart Rate:  [64-90]   Temp:  [36.5 °C (97.7 °F)-36.8 °C (98.2 °F)]   Resp:  [12-39]   Weight:  [129 kg (284 lb 9.8 oz)]   SpO2:  [90 %-100 %]    Intake/Output last 3 Shifts:  I/O last 3 completed shifts:  In: 2072.8 (16.1 mL/kg) [I.V.:897.8 (7 mL/kg); Blood:350; Other:135; NG/GT:540; IV Piggyback:150]  Out: 5535 (42.9 mL/kg) [Drains:250; Other:5285]  Weight: 129.1 kg       Intake/Output Summary (Last 24 hours) at 3/29/2024 1740  Last data filed at 3/29/2024 1700  Gross per 24 hour   Intake 1962.01 ml   Output 5058 ml   Net -3095.99 ml       Relevant Results  Results for orders placed or performed during the hospital encounter of 03/05/24 (from the past 24 hour(s))   POCT GLUCOSE   Result Value Ref Range    POCT Glucose 238 (H) 74 - 99 mg/dL   Blood Gas Arterial Full Panel   Result Value Ref Range    POCT pH, Arterial 7.47 (H) 7.38 - 7.42 pH    POCT pCO2, Arterial 30 (L) 38 - 42 mm Hg    POCT pO2, Arterial 118 (H) 85 - 95 mm Hg    POCT SO2, Arterial 100 94 - 100 %    POCT Oxy Hemoglobin, Arterial 96.4 94.0 - 98.0 %    POCT Hematocrit Calculated, Arterial 26.0 (L) 41.0 - 52.0 %    POCT Sodium, Arterial 133 (L) 136 - 145 mmol/L    POCT Potassium, Arterial 4.5 3.5 - 5.3 mmol/L    POCT Chloride, Arterial 102 98 - 107 mmol/L    POCT Ionized Calcium, Arterial 1.10 1.10 - 1.33 mmol/L    POCT Glucose, Arterial 253 (H) 74 - 99 mg/dL    POCT Lactate, Arterial 2.2 (H) 0.4 - 2.0 mmol/L    POCT Base Excess, Arterial -1.4 -2.0 - 3.0 mmol/L    POCT HCO3 Calculated, Arterial 21.8 (L) 22.0 - 26.0 mmol/L    POCT Hemoglobin, Arterial 8.8 (L) 13.5 - 17.5 g/dL    POCT Anion Gap, Arterial 14 10 - 25 mmo/L    Patient Temperature 37.0 degrees Celsius    FiO2 40 %   POCT GLUCOSE   Result " Value Ref Range    POCT Glucose 223 (H) 74 - 99 mg/dL   POCT GLUCOSE   Result Value Ref Range    POCT Glucose 172 (H) 74 - 99 mg/dL   POCT GLUCOSE   Result Value Ref Range    POCT Glucose 166 (H) 74 - 99 mg/dL   CBC   Result Value Ref Range    WBC 16.2 (H) 4.4 - 11.3 x10*3/uL    nRBC 2.2 (H) 0.0 - 0.0 /100 WBCs    RBC 2.99 (L) 4.50 - 5.90 x10*6/uL    Hemoglobin 8.7 (L) 13.5 - 17.5 g/dL    Hematocrit 24.8 (L) 41.0 - 52.0 %    MCV 83 80 - 100 fL    MCH 29.1 26.0 - 34.0 pg    MCHC 35.1 32.0 - 36.0 g/dL    RDW 16.5 (H) 11.5 - 14.5 %    Platelets 57 (L) 150 - 450 x10*3/uL   Magnesium   Result Value Ref Range    Magnesium 2.71 (H) 1.60 - 2.40 mg/dL   Renal Function Panel   Result Value Ref Range    Glucose 171 (H) 74 - 99 mg/dL    Sodium 136 136 - 145 mmol/L    Potassium 4.2 3.5 - 5.3 mmol/L    Chloride 101 98 - 107 mmol/L    Bicarbonate 24 21 - 32 mmol/L    Anion Gap 15 10 - 20 mmol/L    Urea Nitrogen 48 (H) 6 - 23 mg/dL    Creatinine 2.16 (H) 0.50 - 1.30 mg/dL    eGFR 34 (L) >60 mL/min/1.73m*2    Calcium 8.5 (L) 8.6 - 10.6 mg/dL    Phosphorus 2.1 (L) 2.5 - 4.9 mg/dL    Albumin 3.4 3.4 - 5.0 g/dL   Fibrinogen   Result Value Ref Range    Fibrinogen 181 (L) 200 - 400 mg/dL   Blood Gas Arterial Full Panel   Result Value Ref Range    POCT pH, Arterial 7.48 (H) 7.38 - 7.42 pH    POCT pCO2, Arterial 31 (L) 38 - 42 mm Hg    POCT pO2, Arterial 111 (H) 85 - 95 mm Hg    POCT SO2, Arterial 99 94 - 100 %    POCT Oxy Hemoglobin, Arterial 97.0 94.0 - 98.0 %    POCT Hematocrit Calculated, Arterial 27.0 (L) 41.0 - 52.0 %    POCT Sodium, Arterial 134 (L) 136 - 145 mmol/L    POCT Potassium, Arterial 4.3 3.5 - 5.3 mmol/L    POCT Chloride, Arterial 101 98 - 107 mmol/L    POCT Ionized Calcium, Arterial 1.12 1.10 - 1.33 mmol/L    POCT Glucose, Arterial 168 (H) 74 - 99 mg/dL    POCT Lactate, Arterial 3.2 (H) 0.4 - 2.0 mmol/L    POCT Base Excess, Arterial 0.0 -2.0 - 3.0 mmol/L    POCT HCO3 Calculated, Arterial 23.1 22.0 - 26.0 mmol/L    POCT  Hemoglobin, Arterial 9.0 (L) 13.5 - 17.5 g/dL    POCT Anion Gap, Arterial 14 10 - 25 mmo/L    Patient Temperature 37.0 degrees Celsius    FiO2 40 %   Blood Gas Arterial Full Panel   Result Value Ref Range    POCT pH, Arterial 7.46 (H) 7.38 - 7.42 pH    POCT pCO2, Arterial 32 (L) 38 - 42 mm Hg    POCT pO2, Arterial 113 (H) 85 - 95 mm Hg    POCT SO2, Arterial 99 94 - 100 %    POCT Oxy Hemoglobin, Arterial 96.2 94.0 - 98.0 %    POCT Hematocrit Calculated, Arterial 27.0 (L) 41.0 - 52.0 %    POCT Sodium, Arterial 134 (L) 136 - 145 mmol/L    POCT Potassium, Arterial 4.3 3.5 - 5.3 mmol/L    POCT Chloride, Arterial 102 98 - 107 mmol/L    POCT Ionized Calcium, Arterial 1.10 1.10 - 1.33 mmol/L    POCT Glucose, Arterial 149 (H) 74 - 99 mg/dL    POCT Lactate, Arterial 2.7 (H) 0.4 - 2.0 mmol/L    POCT Base Excess, Arterial -0.7 -2.0 - 3.0 mmol/L    POCT HCO3 Calculated, Arterial 22.8 22.0 - 26.0 mmol/L    POCT Hemoglobin, Arterial 9.0 (L) 13.5 - 17.5 g/dL    POCT Anion Gap, Arterial 14 10 - 25 mmo/L    Patient Temperature 37.0 degrees Celsius    FiO2 40 %   Heparin Assay, UFH   Result Value Ref Range    Heparin Unfractionated 0.3 See Comment Below for Therapeutic Ranges IU/mL   POCT GLUCOSE   Result Value Ref Range    POCT Glucose 143 (H) 74 - 99 mg/dL   POCT GLUCOSE   Result Value Ref Range    POCT Glucose 125 (H) 74 - 99 mg/dL   Blood Gas Arterial Full Panel   Result Value Ref Range    POCT pH, Arterial 7.44 (H) 7.38 - 7.42 pH    POCT pCO2, Arterial 34 (L) 38 - 42 mm Hg    POCT pO2, Arterial 141 (H) 85 - 95 mm Hg    POCT SO2, Arterial 100 94 - 100 %    POCT Oxy Hemoglobin, Arterial 96.4 94.0 - 98.0 %    POCT Hematocrit Calculated, Arterial 26.0 (L) 41.0 - 52.0 %    POCT Sodium, Arterial 134 (L) 136 - 145 mmol/L    POCT Potassium, Arterial 4.2 3.5 - 5.3 mmol/L    POCT Chloride, Arterial 104 98 - 107 mmol/L    POCT Ionized Calcium, Arterial 1.09 (L) 1.10 - 1.33 mmol/L    POCT Glucose, Arterial 129 (H) 74 - 99 mg/dL    POCT  Lactate, Arterial 2.1 (H) 0.4 - 2.0 mmol/L    POCT Base Excess, Arterial -0.8 -2.0 - 3.0 mmol/L    POCT HCO3 Calculated, Arterial 23.1 22.0 - 26.0 mmol/L    POCT Hemoglobin, Arterial 8.8 (L) 13.5 - 17.5 g/dL    POCT Anion Gap, Arterial 11 10 - 25 mmo/L    Patient Temperature 37.0 degrees Celsius    FiO2 40 %   CBC   Result Value Ref Range    WBC 15.0 (H) 4.4 - 11.3 x10*3/uL    nRBC 2.5 (H) 0.0 - 0.0 /100 WBCs    RBC 2.85 (L) 4.50 - 5.90 x10*6/uL    Hemoglobin 8.6 (L) 13.5 - 17.5 g/dL    Hematocrit 23.2 (L) 41.0 - 52.0 %    MCV 81 80 - 100 fL    MCH 30.2 26.0 - 34.0 pg    MCHC 37.1 (H) 32.0 - 36.0 g/dL    RDW 16.7 (H) 11.5 - 14.5 %    Platelets 46 (L) 150 - 450 x10*3/uL   Magnesium   Result Value Ref Range    Magnesium 2.66 (H) 1.60 - 2.40 mg/dL   Renal Function Panel   Result Value Ref Range    Glucose 142 (H) 74 - 99 mg/dL    Sodium 137 136 - 145 mmol/L    Potassium 4.4 3.5 - 5.3 mmol/L    Chloride 102 98 - 107 mmol/L    Bicarbonate 24 21 - 32 mmol/L    Anion Gap 15 10 - 20 mmol/L    Urea Nitrogen 49 (H) 6 - 23 mg/dL    Creatinine 2.04 (H) 0.50 - 1.30 mg/dL    eGFR 36 (L) >60 mL/min/1.73m*2    Calcium 8.3 (L) 8.6 - 10.6 mg/dL    Phosphorus 2.3 (L) 2.5 - 4.9 mg/dL    Albumin 3.3 (L) 3.4 - 5.0 g/dL   Blood Gas Arterial Full Panel   Result Value Ref Range    POCT pH, Arterial 7.48 (H) 7.38 - 7.42 pH    POCT pCO2, Arterial 31 (L) 38 - 42 mm Hg    POCT pO2, Arterial 134 (H) 85 - 95 mm Hg    POCT SO2, Arterial 100 94 - 100 %    POCT Oxy Hemoglobin, Arterial 97.0 94.0 - 98.0 %    POCT Hematocrit Calculated, Arterial 26.0 (L) 41.0 - 52.0 %    POCT Sodium, Arterial 133 (L) 136 - 145 mmol/L    POCT Potassium, Arterial 4.2 3.5 - 5.3 mmol/L    POCT Chloride, Arterial 104 98 - 107 mmol/L    POCT Ionized Calcium, Arterial 1.09 (L) 1.10 - 1.33 mmol/L    POCT Glucose, Arterial 135 (H) 74 - 99 mg/dL    POCT Lactate, Arterial 2.1 (H) 0.4 - 2.0 mmol/L    POCT Base Excess, Arterial -0.1 -2.0 - 3.0 mmol/L    POCT HCO3 Calculated,  Arterial 23.1 22.0 - 26.0 mmol/L    POCT Hemoglobin, Arterial 8.8 (L) 13.5 - 17.5 g/dL    POCT Anion Gap, Arterial 10 10 - 25 mmo/L    Patient Temperature 37.0 degrees Celsius    FiO2 40 %   Hepatic function panel   Result Value Ref Range    Albumin 3.3 (L) 3.4 - 5.0 g/dL    Bilirubin, Total 16.2 (H) 0.0 - 1.2 mg/dL    Bilirubin, Direct 9.4 (H) 0.0 - 0.3 mg/dL    Alkaline Phosphatase 116 33 - 136 U/L     (H) 10 - 52 U/L     (H) 9 - 39 U/L    Total Protein 5.2 (L) 6.4 - 8.2 g/dL   CBC and Auto Differential   Result Value Ref Range    WBC 15.0 (H) 4.4 - 11.3 x10*3/uL    nRBC 2.5 (H) 0.0 - 0.0 /100 WBCs    RBC 2.85 (L) 4.50 - 5.90 x10*6/uL    Hemoglobin 8.6 (L) 13.5 - 17.5 g/dL    Hematocrit 23.2 (L) 41.0 - 52.0 %    MCV 81 80 - 100 fL    MCH 30.2 26.0 - 34.0 pg    MCHC 37.1 (H) 32.0 - 36.0 g/dL    RDW 16.7 (H) 11.5 - 14.5 %    Platelets 46 (L) 150 - 450 x10*3/uL    Neutrophils % 88.9 40.0 - 80.0 %    Immature Granulocytes %, Automated 5.6 (H) 0.0 - 0.9 %    Lymphocytes % 2.1 13.0 - 44.0 %    Monocytes % 3.0 2.0 - 10.0 %    Eosinophils % 0.1 0.0 - 6.0 %    Basophils % 0.3 0.0 - 2.0 %    Neutrophils Absolute 13.44 (H) 1.20 - 7.70 x10*3/uL    Immature Granulocytes Absolute, Automated 0.85 (H) 0.00 - 0.70 x10*3/uL    Lymphocytes Absolute 0.32 (L) 1.20 - 4.80 x10*3/uL    Monocytes Absolute 0.45 0.10 - 1.00 x10*3/uL    Eosinophils Absolute 0.01 0.00 - 0.70 x10*3/uL    Basophils Absolute 0.04 0.00 - 0.10 x10*3/uL   Coagulation Screen   Result Value Ref Range    Protime 16.7 (H) 9.8 - 12.8 seconds    INR 1.5 (H) 0.9 - 1.1    aPTT 95 (H) 27 - 38 seconds   Fibrinogen   Result Value Ref Range    Fibrinogen 185 (L) 200 - 400 mg/dL   Calcium, ionized   Result Value Ref Range    POCT Calcium, Ionized 1.08 (L) 1.1 - 1.33 mmol/L   Blood Gas Arterial Full Panel   Result Value Ref Range    POCT pH, Arterial 7.45 (H) 7.38 - 7.42 pH    POCT pCO2, Arterial 32 (L) 38 - 42 mm Hg    POCT pO2, Arterial 59 (L) 85 - 95 mm Hg     POCT SO2, Arterial 94 94 - 100 %    POCT Oxy Hemoglobin, Arterial 90.6 (L) 94.0 - 98.0 %    POCT Hematocrit Calculated, Arterial 26.0 (L) 41.0 - 52.0 %    POCT Sodium, Arterial 134 (L) 136 - 145 mmol/L    POCT Potassium, Arterial 4.4 3.5 - 5.3 mmol/L    POCT Chloride, Arterial 103 98 - 107 mmol/L    POCT Ionized Calcium, Arterial 1.09 (L) 1.10 - 1.33 mmol/L    POCT Glucose, Arterial 176 (H) 74 - 99 mg/dL    POCT Lactate, Arterial 2.0 0.4 - 2.0 mmol/L    POCT Base Excess, Arterial -1.4 -2.0 - 3.0 mmol/L    POCT HCO3 Calculated, Arterial 22.2 22.0 - 26.0 mmol/L    POCT Hemoglobin, Arterial 8.6 (L) 13.5 - 17.5 g/dL    POCT Anion Gap, Arterial 13 10 - 25 mmo/L    Patient Temperature 37.0 degrees Celsius    FiO2 40 %   POCT GLUCOSE   Result Value Ref Range    POCT Glucose 162 (H) 74 - 99 mg/dL   Transthoracic Echo (TTE) Limited   Result Value Ref Range    LV biplane EF 57 %    MV E/A ratio 1.39     Tricuspid annular plane systolic excursion 2.1 cm    RV free wall pk S' 16.40 cm/s    LVIDd 4.30 cm    RVSP 33.0 mmHg    LV A4C EF 54.1    CBC   Result Value Ref Range    WBC 13.3 (H) 4.4 - 11.3 x10*3/uL    nRBC 2.2 (H) 0.0 - 0.0 /100 WBCs    RBC 2.77 (L) 4.50 - 5.90 x10*6/uL    Hemoglobin 8.2 (L) 13.5 - 17.5 g/dL    Hematocrit 22.2 (L) 41.0 - 52.0 %    MCV 80 80 - 100 fL    MCH 29.6 26.0 - 34.0 pg    MCHC 36.9 (H) 32.0 - 36.0 g/dL    RDW 17.2 (H) 11.5 - 14.5 %    Platelets 36 (LL) 150 - 450 x10*3/uL   Coagulation Screen   Result Value Ref Range    Protime 16.7 (H) 9.8 - 12.8 seconds    INR 1.5 (H) 0.9 - 1.1    aPTT 95 (H) 27 - 38 seconds   Magnesium   Result Value Ref Range    Magnesium 2.67 (H) 1.60 - 2.40 mg/dL   Renal Function Panel   Result Value Ref Range    Glucose 203 (H) 74 - 99 mg/dL    Sodium 137 136 - 145 mmol/L    Potassium 4.5 3.5 - 5.3 mmol/L    Chloride 103 98 - 107 mmol/L    Bicarbonate 22 21 - 32 mmol/L    Anion Gap 17 10 - 20 mmol/L    Urea Nitrogen 50 (H) 6 - 23 mg/dL    Creatinine 2.06 (H) 0.50 - 1.30  mg/dL    eGFR 36 (L) >60 mL/min/1.73m*2    Calcium 8.0 (L) 8.6 - 10.6 mg/dL    Phosphorus 2.5 2.5 - 4.9 mg/dL    Albumin 3.5 3.4 - 5.0 g/dL   Calcium, ionized   Result Value Ref Range    POCT Calcium, Ionized 1.03 (L) 1.1 - 1.33 mmol/L   Fibrinogen   Result Value Ref Range    Fibrinogen 170 (L) 200 - 400 mg/dL   Blood Gas Arterial Full Panel   Result Value Ref Range    POCT pH, Arterial 7.49 (H) 7.38 - 7.42 pH    POCT pCO2, Arterial 27 (L) 38 - 42 mm Hg    POCT pO2, Arterial 90 85 - 95 mm Hg    POCT SO2, Arterial 100 94 - 100 %    POCT Oxy Hemoglobin, Arterial 96.5 94.0 - 98.0 %    POCT Hematocrit Calculated, Arterial 25.0 (L) 41.0 - 52.0 %    POCT Sodium, Arterial 134 (L) 136 - 145 mmol/L    POCT Potassium, Arterial 4.3 3.5 - 5.3 mmol/L    POCT Chloride, Arterial 103 98 - 107 mmol/L    POCT Ionized Calcium, Arterial 1.05 (L) 1.10 - 1.33 mmol/L    POCT Glucose, Arterial 192 (H) 74 - 99 mg/dL    POCT Lactate, Arterial 1.8 0.4 - 2.0 mmol/L    POCT Base Excess, Arterial -2.2 (L) -2.0 - 3.0 mmol/L    POCT HCO3 Calculated, Arterial 20.6 (L) 22.0 - 26.0 mmol/L    POCT Hemoglobin, Arterial 8.3 (L) 13.5 - 17.5 g/dL    POCT Anion Gap, Arterial 15 10 - 25 mmo/L    Patient Temperature 37.0 degrees Celsius    FiO2 50 %   POCT GLUCOSE   Result Value Ref Range    POCT Glucose 226 (H) 74 - 99 mg/dL   POCT GLUCOSE   Result Value Ref Range    POCT Glucose 238 (H) 74 - 99 mg/dL     Scheduled medications  albumin human, 25 g, intravenous, q6h  dexAMETHasone, 13.2 mg, intravenous, q12h  esomeprazole, 40 mg, nasogastric tube, Daily before breakfast  oxygen, , inhalation, Continuous - Inhalation  thiamine, 100 mg, intravenous, Daily      Continuous medications  dexmedeTOMIDine, 0.1-1.5 mcg/kg/hr, Last Rate: 0.2 mcg/kg/hr (03/29/24 1700)  heparin, 0-4,000 Units/hr, Last Rate: 1,600 Units/hr (03/29/24 1700)  insulin regular infusion for cardiac surgery, 0-15 Units/hr, Last Rate: 5 Units/hr (03/29/24 1705)  lactated Ringer's, 5 mL/hr, Last  Rate: 5 mL/hr (03/29/24 1700)  norepinephrine, 0.01-0.5 mcg/kg/min (Dosing Weight), Last Rate: 0.02 mcg/kg/min (03/29/24 1717)  PrismaSol 4/2.5, 2,400 mL/hr, Last Rate: 2,400 mL/hr (03/28/24 2040)      PRN medications  PRN medications: alteplase, calcium gluconate, calcium gluconate, dextrose **OR** glucagon, magnesium sulfate, magnesium sulfate            Assessment/Plan   Jason Hollins is a 63 y.o. 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 Hawkins and Dr. Smith on 3/5/24.  Post operative course was uncomplicated and patient was on RNF until 3/20 for prolonged bed rest to avoid hip flexion to maintain flap integrity.  Patient was on prophylactic lovenox while on bedrest.     3/20: Cardiopulmonary arrest s/p CPR with ROSC after TPA, overnight started on heparin, epo, increased pressor requirements, increased swelling at flap site.     3/21: Hemorrhagic shock, MTP. Firm and large right flap site. Return to OR s/p Exploration of right thigh wound, Evacuation of hematoma. Suture ligation of multiple bleeding vessel and several points in gluteal area.     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 Hawkins and Dr. Smith on 3/5/24 s/p cardiopulmonary arrest with ROSC 3/20. Documented to have followed commands off sedation. CT head 3/22 without acute process. Weaned off cisatracurium and propofol overnight 3/23-3/24. Ketamine weaned off 3/25 and Fentanyl weaned off 3/26 am. Repeat CT Head 3/26 without acute process. Neuro exam 3/27 unchanged: Positive cough and gag on exam. PERRL. Unable to arouse. Not withdrawing to noxious  stimuli. Video EEG with evidence of severe diffuse encephalopathy. Neuro exam now improving 3/28: Patient opening eyes to verbal stimulation and following simple commands. Weak bilateral hand grasps and wiggles toes in left foot. Unable to move RLE.   - ongoing neuro and pain assessments  - add precedex, titrate to goal RASS 0 to -2  - encephalopathy labs -> thiamine pending (ammonia and TSH WNL)  - PT/OT -> PROM  - no need for soft wrist restraints at this time -> continue to reassess     CV: History of HTN, HLD. Acute cardiopulmonary arrest 2/2 to possible massive PE vs massive STEMI, received multiple rounds of CPR and one defibrillation.  Sustained ROSC achieved after TPA bolus. On high dose levophed, epinephrine, vaso, angioT2. Plan on 3/21 was for ECMO which was aborted secondary to large hemhorrge at right flap site. Had MTP and taken OR with 5L evacuated. ECHO 3/21: Normal LV, Mod enlarged RV, slight suggestion of a Townsend's sign / RV strain, low normal RV function. ECHO 3/22 with hyperdynamic LV. New onset Afib with RVR overnight 3/22-3/23, dosed with 150mg amio x2, started infusion at 1mg/min thereafter. AT2 weaned off. Amio infusion discontinued 3/25. Lactate downtrending. Vaso and epi weaned off. Remains in NSR. iEpo weaned off 3/27. Lactate downtrending. Levo at 0.01.  - continuous EKG/abp/cvp monitoring  - Goal map range 65-90  - wean levo as tolerated  - SDS switched to decadron 3/28 -> see heme  - continue heparin infusion  - continue to trend lactate  - continue aggressive fluid removal with CVVH as hemodynamics permit  - repeat TTE -> essentially unchanged     PULM: Arrived to SICU intubated julio c-CPR. Concern for massive PE. Started on iEpo, currently on 0.05. Hypoxic respiratory failure. Severe ARDS. ETT exchanged 3/23. CT Chest 3/26 with interval JOHN consolidative/ground glass opacity. Currently on AC/VC 50%, 550, 24, +10.   - ARDSnet lung protective strategies  - Wean FiO2 as tolerated  -  keep peep at 10 today  - ABGs prn  - additional pulm toilet prn  - daily CXR  - ENT consulted for trach placement -> OR Monday 4/1    ENT: Had epistaxis 3/23, completed afrin bid x3 days  - monitor, low threshold to consult ENT  - avoid NG placement for now     GI: NPO. Shock liver, pancreatitis. Elevated TG. Elevated lactate. Consulted ACS for potential bowel ischemia as cause of persistent lactic acidosis. CT imaging 3/22 with evidence of pancreatitis. No acute surgical indication per ACS. RUQ US 3/22 with thickening of GB wall without cholelithiasis. CT A/P 3/26 negative for acute bleed. LFTs downtrending. Worsening hyperbilirubinemia. Amylase and lipase now WNL.   - continue to increase TFs to goal 45ml/hr  - prostat daily  - PPI for GI ppx  - Trend LFTs daily  - IV thiamine 100mg IV x7 days     : No history of renal disease. Baseline creatinine 0.6. Anuric RUTH. Elevated CK, downtrending. Metabolic acidosis, total body fluid overload, hyperkalemia. Started on CVVH 3/21, stopped bicarb infusion 3/22. Marino removed 3/24. Hyponatremia. Net negative 2.5L for past 24hrs.  - Nephrology following, appreciate recs  - continue CVVH -> goal negative 3-4L  - Albumin 25% q6h x4 doses  - RFP BID and PRN  - Replete electrolytes judiciously  - Daily bladder scan     HEME: Patient was on ppx lovenox for DVT prophylaxis prior to cardiopulmonary arrest. Concern for massive PE patient received bolus of TPA during CPR. Started on heparin infusion overnight given concern for PE. Hemorrhagic shock 3/21, MTP and back to OR s/p Exploration of right thigh woundEvacuation of hematoma. Suture ligation of multiple bleeding vessel and several points in gluteal area. Hematology consulted 3/22 to r/o HLH. Transfused 1 RBC overnight 3/22-3/23. Thrombocytopenia, coagulapathy, hypofibrinogenemia. LE Duplex 3/25 +acute occlusive R soleal DVT. Received 2u PRBC and 1u FFP on 3/26. Heparin infusion discontinued 3/26 to r/o HIT and transitioned to  bival. PF4 negative, resumed heparin infuision 3/27. Received 1u PRBC 3/27 evening.  - cbc, coags bid and prn  - fibrinogen daily  - continue heaprin infusion  - hold off on vitamin K for now  - ongoing monitoring for s/s bleeding  - close surveillance of RLE and drains  - F/U heme recs -> NK function test pending  - continue Decadron 13mg BID for suspicion of HLH (will need slow wean when ready)  - Vasc Med following, appreciate recs  - maintain active T&S (3/28)    ENDO: History of DM2. A1c 7.5%. TSH WNL 1/2024. Insulin infusion discontinued overnight.  - q4h BG checks and SSI     ID: Afebrile. Leukocytosis. On broad antimicrobial therapy. MRSA screen + 2/28/24. Pan cultures sent 3/22. Sputum NTF, +MRSA screen (completed 5 day course mupirocin), UCx and BCx negative. Completed 7 day course vanc/zosyn 3/27.   - temp q4h, wbc daily  - ongoing monitoring for s/s infection    Lines:  - right brachial arterial line (3/20)  - RIJ trialysis (3/27)  - left subclavian MAC with mini MAC (3/20)  - PIV x1     I have discussed the case with the resident/advanced practice provider. I have personally performed a history, physical exam, and my own medical decision making. I have reviewed the note and agree with the findings and plan with the following exceptions as identified below. I have personally provided 36 minutes of critical care time exclusive of time spent on separately billable procedures. Time includes review of laboratory data, radiology results, discussion with consultants, family and monitoring for potential decompensation. Interventions were performed as documented above.      Family/Surrogate updated with plan of care.  Code status addressed/up to date.       Alan Xiong MD  SICU phone [phone]    Critical care time: 50 minutes

## 2024-03-29 NOTE — PROCEDURES
I evaluated the patient during while s/he was receiving CVVH    BP: 134/56 on norepi  BFR: 200  Replacement fluid: Prismasol 4K/2.5Ca  Flow rate: 800/pump/800/filter/800    Plan: Continue CVVH until weaned from pressors

## 2024-03-29 NOTE — PROGRESS NOTES
"03/29 REVEW  Same as below but plan for trache 4/1 which will most likely change dispo to LTACH (auth needed with [employer]).     03/28 REVIEW   Continues requiring ICU loc - still intubated (7d), on CVVH, Palliative Oncology consulted 3/24 for soft tissue sarcoma & family support. 3/25 rt. Soleal DVT (C: Vasc Sx) Not clear if dc plan will remain for SNF, [facility] or [facility] in which case need to re-connect with closer to dc and subsequently need [employer] auth.       03/25 REVIEW  Continues requiring ICU loc - still intubated (5d), on CVVH, and now Palliative Oncology consulted 3/24 for family support. Not clear if dc plan will remain for [facility], [facility] or [facility].          03/22 1045  Services for patient keep changing now entirely on Plastics (CTICU) and per today's Plastic Surgery -- \"Remains critically ill in CTICU,  CRRT initiated overnight. Remains intubated/sedated with levo, vaso, epi, angiotensin II running Overnight Events Dialysis line placed, CVVHD initiated. Reviewed above notes copied to this progress note.... +added updated clinicals in [facility] and provided SNFs - [facility] update. To continue to follow and if SNF still dc plan, obtain foc from patient/family when appropritate.     Chantelle Ramirez (LSW, MSW)     "

## 2024-03-30 ENCOUNTER — APPOINTMENT (OUTPATIENT)
Dept: RADIOLOGY | Facility: HOSPITAL | Age: 64
DRG: 003 | End: 2024-03-30
Payer: COMMERCIAL

## 2024-03-30 LAB
ALBUMIN SERPL BCP-MCNC: 3.5 G/DL (ref 3.4–5)
ALBUMIN SERPL BCP-MCNC: 3.9 G/DL (ref 3.4–5)
ANION GAP BLDA CALCULATED.4IONS-SCNC: 14 MMO/L (ref 10–25)
ANION GAP BLDA CALCULATED.4IONS-SCNC: 15 MMO/L (ref 10–25)
ANION GAP BLDA CALCULATED.4IONS-SCNC: 18 MMO/L (ref 10–25)
ANION GAP BLDA CALCULATED.4IONS-SCNC: 9 MMO/L (ref 10–25)
ANION GAP SERPL CALC-SCNC: 16 MMOL/L (ref 10–20)
ANION GAP SERPL CALC-SCNC: 19 MMOL/L (ref 10–20)
BASE EXCESS BLDA CALC-SCNC: -0.1 MMOL/L (ref -2–3)
BASE EXCESS BLDA CALC-SCNC: -0.2 MMOL/L (ref -2–3)
BASE EXCESS BLDA CALC-SCNC: -0.3 MMOL/L (ref -2–3)
BASE EXCESS BLDA CALC-SCNC: 1.1 MMOL/L (ref -2–3)
BODY TEMPERATURE: 37 DEGREES CELSIUS
BUN SERPL-MCNC: 54 MG/DL (ref 6–23)
BUN SERPL-MCNC: 64 MG/DL (ref 6–23)
CA-I BLD-SCNC: 0.99 MMOL/L (ref 1.1–1.33)
CA-I BLD-SCNC: 1.06 MMOL/L (ref 1.1–1.33)
CA-I BLDA-SCNC: 1.09 MMOL/L (ref 1.1–1.33)
CA-I BLDA-SCNC: 1.11 MMOL/L (ref 1.1–1.33)
CA-I BLDA-SCNC: 1.11 MMOL/L (ref 1.1–1.33)
CA-I BLDA-SCNC: 1.12 MMOL/L (ref 1.1–1.33)
CALCIUM SERPL-MCNC: 7.8 MG/DL (ref 8.6–10.6)
CALCIUM SERPL-MCNC: 8.4 MG/DL (ref 8.6–10.6)
CHLORIDE BLDA-SCNC: 100 MMOL/L (ref 98–107)
CHLORIDE BLDA-SCNC: 101 MMOL/L (ref 98–107)
CHLORIDE BLDA-SCNC: 101 MMOL/L (ref 98–107)
CHLORIDE BLDA-SCNC: 103 MMOL/L (ref 98–107)
CHLORIDE SERPL-SCNC: 100 MMOL/L (ref 98–107)
CHLORIDE SERPL-SCNC: 101 MMOL/L (ref 98–107)
CO2 SERPL-SCNC: 21 MMOL/L (ref 21–32)
CO2 SERPL-SCNC: 23 MMOL/L (ref 21–32)
CREAT SERPL-MCNC: 2.05 MG/DL (ref 0.5–1.3)
CREAT SERPL-MCNC: 2.08 MG/DL (ref 0.5–1.3)
EGFRCR SERPLBLD CKD-EPI 2021: 35 ML/MIN/1.73M*2
EGFRCR SERPLBLD CKD-EPI 2021: 36 ML/MIN/1.73M*2
ERYTHROCYTE [DISTWIDTH] IN BLOOD BY AUTOMATED COUNT: 18.6 % (ref 11.5–14.5)
ERYTHROCYTE [DISTWIDTH] IN BLOOD BY AUTOMATED COUNT: 19 % (ref 11.5–14.5)
ERYTHROCYTE [DISTWIDTH] IN BLOOD BY AUTOMATED COUNT: 19.5 % (ref 11.5–14.5)
FERRITIN SERPL-MCNC: 699 NG/ML (ref 20–300)
FIBRINOGEN PPP-MCNC: 160 MG/DL (ref 200–400)
GLUCOSE BLD MANUAL STRIP-MCNC: 122 MG/DL (ref 74–99)
GLUCOSE BLD MANUAL STRIP-MCNC: 124 MG/DL (ref 74–99)
GLUCOSE BLD MANUAL STRIP-MCNC: 148 MG/DL (ref 74–99)
GLUCOSE BLD MANUAL STRIP-MCNC: 155 MG/DL (ref 74–99)
GLUCOSE BLD MANUAL STRIP-MCNC: 158 MG/DL (ref 74–99)
GLUCOSE BLD MANUAL STRIP-MCNC: 168 MG/DL (ref 74–99)
GLUCOSE BLD MANUAL STRIP-MCNC: 179 MG/DL (ref 74–99)
GLUCOSE BLD MANUAL STRIP-MCNC: 187 MG/DL (ref 74–99)
GLUCOSE BLD MANUAL STRIP-MCNC: 203 MG/DL (ref 74–99)
GLUCOSE BLD MANUAL STRIP-MCNC: 203 MG/DL (ref 74–99)
GLUCOSE BLD MANUAL STRIP-MCNC: 205 MG/DL (ref 74–99)
GLUCOSE BLD MANUAL STRIP-MCNC: 216 MG/DL (ref 74–99)
GLUCOSE BLD MANUAL STRIP-MCNC: 217 MG/DL (ref 74–99)
GLUCOSE BLD MANUAL STRIP-MCNC: 231 MG/DL (ref 74–99)
GLUCOSE BLD MANUAL STRIP-MCNC: 236 MG/DL (ref 74–99)
GLUCOSE BLDA-MCNC: 107 MG/DL (ref 74–99)
GLUCOSE BLDA-MCNC: 168 MG/DL (ref 74–99)
GLUCOSE BLDA-MCNC: 197 MG/DL (ref 74–99)
GLUCOSE BLDA-MCNC: 198 MG/DL (ref 74–99)
GLUCOSE SERPL-MCNC: 195 MG/DL (ref 74–99)
GLUCOSE SERPL-MCNC: 212 MG/DL (ref 74–99)
HCO3 BLDA-SCNC: 23.1 MMOL/L (ref 22–26)
HCO3 BLDA-SCNC: 23.8 MMOL/L (ref 22–26)
HCO3 BLDA-SCNC: 23.9 MMOL/L (ref 22–26)
HCO3 BLDA-SCNC: 24.7 MMOL/L (ref 22–26)
HCT VFR BLD AUTO: 21.1 % (ref 41–52)
HCT VFR BLD AUTO: 21.9 % (ref 41–52)
HCT VFR BLD AUTO: 23.1 % (ref 41–52)
HCT VFR BLD EST: 24 % (ref 41–52)
HCT VFR BLD EST: 25 % (ref 41–52)
HCT VFR BLD EST: 25 % (ref 41–52)
HCT VFR BLD EST: 26 % (ref 41–52)
HGB BLD-MCNC: 7.3 G/DL (ref 13.5–17.5)
HGB BLD-MCNC: 7.6 G/DL (ref 13.5–17.5)
HGB BLD-MCNC: 8.1 G/DL (ref 13.5–17.5)
HGB BLDA-MCNC: 8.1 G/DL (ref 13.5–17.5)
HGB BLDA-MCNC: 8.4 G/DL (ref 13.5–17.5)
HGB BLDA-MCNC: 8.4 G/DL (ref 13.5–17.5)
HGB BLDA-MCNC: 8.5 G/DL (ref 13.5–17.5)
HGB RETIC QN: 30 PG (ref 28–38)
IMMATURE RETIC FRACTION: 31.1 %
INHALED O2 CONCENTRATION: 50 %
LACTATE BLDA-SCNC: 2.3 MMOL/L (ref 0.4–2)
LACTATE BLDA-SCNC: 2.3 MMOL/L (ref 0.4–2)
LACTATE BLDA-SCNC: 2.5 MMOL/L (ref 0.4–2)
LACTATE BLDA-SCNC: 3.2 MMOL/L (ref 0.4–2)
LDH SERPL L TO P-CCNC: 450 U/L (ref 84–246)
MAGNESIUM SERPL-MCNC: 2.62 MG/DL (ref 1.6–2.4)
MAGNESIUM SERPL-MCNC: 2.72 MG/DL (ref 1.6–2.4)
MCH RBC QN AUTO: 29.6 PG (ref 26–34)
MCH RBC QN AUTO: 29.8 PG (ref 26–34)
MCH RBC QN AUTO: 29.9 PG (ref 26–34)
MCHC RBC AUTO-ENTMCNC: 34.6 G/DL (ref 32–36)
MCHC RBC AUTO-ENTMCNC: 34.7 G/DL (ref 32–36)
MCHC RBC AUTO-ENTMCNC: 35.1 G/DL (ref 32–36)
MCV RBC AUTO: 84 FL (ref 80–100)
MCV RBC AUTO: 86 FL (ref 80–100)
MCV RBC AUTO: 87 FL (ref 80–100)
NRBC BLD-RTO: 1 /100 WBCS (ref 0–0)
NRBC BLD-RTO: 1.9 /100 WBCS (ref 0–0)
NRBC BLD-RTO: 1.9 /100 WBCS (ref 0–0)
OXYHGB MFR BLDA: 94.6 % (ref 94–98)
OXYHGB MFR BLDA: 95.6 % (ref 94–98)
OXYHGB MFR BLDA: 95.7 % (ref 94–98)
OXYHGB MFR BLDA: 96.1 % (ref 94–98)
PCO2 BLDA: 31 MM HG (ref 38–42)
PCO2 BLDA: 34 MM HG (ref 38–42)
PCO2 BLDA: 35 MM HG (ref 38–42)
PCO2 BLDA: 36 MM HG (ref 38–42)
PEEP CMH2O: 10 CM H2O
PH BLDA: 7.43 PH (ref 7.38–7.42)
PH BLDA: 7.44 PH (ref 7.38–7.42)
PH BLDA: 7.47 PH (ref 7.38–7.42)
PH BLDA: 7.48 PH (ref 7.38–7.42)
PHOSPHATE SERPL-MCNC: 2.3 MG/DL (ref 2.5–4.9)
PHOSPHATE SERPL-MCNC: 3.5 MG/DL (ref 2.5–4.9)
PLATELET # BLD AUTO: 18 X10*3/UL (ref 150–450)
PLATELET # BLD AUTO: 24 X10*3/UL (ref 150–450)
PLATELET # BLD AUTO: 35 X10*3/UL (ref 150–450)
PO2 BLDA: 107 MM HG (ref 85–95)
PO2 BLDA: 75 MM HG (ref 85–95)
PO2 BLDA: 83 MM HG (ref 85–95)
PO2 BLDA: 90 MM HG (ref 85–95)
POTASSIUM BLDA-SCNC: 4.7 MMOL/L (ref 3.5–5.3)
POTASSIUM BLDA-SCNC: 4.8 MMOL/L (ref 3.5–5.3)
POTASSIUM BLDA-SCNC: 5.1 MMOL/L (ref 3.5–5.3)
POTASSIUM BLDA-SCNC: 5.1 MMOL/L (ref 3.5–5.3)
POTASSIUM SERPL-SCNC: 4.8 MMOL/L (ref 3.5–5.3)
POTASSIUM SERPL-SCNC: 5.3 MMOL/L (ref 3.5–5.3)
RBC # BLD AUTO: 2.44 X10*6/UL (ref 4.5–5.9)
RBC # BLD AUTO: 2.55 X10*6/UL (ref 4.5–5.9)
RBC # BLD AUTO: 2.74 X10*6/UL (ref 4.5–5.9)
RETICS #: 0.14 X10*6/UL (ref 0.02–0.12)
RETICS/RBC NFR AUTO: 5.8 % (ref 0.5–2)
SAO2 % BLDA: 97 % (ref 94–100)
SAO2 % BLDA: 98 % (ref 94–100)
SODIUM BLDA-SCNC: 132 MMOL/L (ref 136–145)
SODIUM BLDA-SCNC: 134 MMOL/L (ref 136–145)
SODIUM BLDA-SCNC: 135 MMOL/L (ref 136–145)
SODIUM BLDA-SCNC: 136 MMOL/L (ref 136–145)
SODIUM SERPL-SCNC: 135 MMOL/L (ref 136–145)
SODIUM SERPL-SCNC: 135 MMOL/L (ref 136–145)
UFH PPP CHRO-ACNC: 0.3 IU/ML
WBC # BLD AUTO: 14.4 X10*3/UL (ref 4.4–11.3)
WBC # BLD AUTO: 14.5 X10*3/UL (ref 4.4–11.3)
WBC # BLD AUTO: 15.3 X10*3/UL (ref 4.4–11.3)

## 2024-03-30 PROCEDURE — 70553 MRI BRAIN STEM W/O & W/DYE: CPT

## 2024-03-30 PROCEDURE — 71045 X-RAY EXAM CHEST 1 VIEW: CPT | Performed by: RADIOLOGY

## 2024-03-30 PROCEDURE — 83615 LACTATE (LD) (LDH) ENZYME: CPT | Performed by: PHYSICIAN ASSISTANT

## 2024-03-30 PROCEDURE — 71045 X-RAY EXAM CHEST 1 VIEW: CPT

## 2024-03-30 PROCEDURE — 82728 ASSAY OF FERRITIN: CPT | Performed by: STUDENT IN AN ORGANIZED HEALTH CARE EDUCATION/TRAINING PROGRAM

## 2024-03-30 PROCEDURE — 87493 C DIFF AMPLIFIED PROBE: CPT | Performed by: STUDENT IN AN ORGANIZED HEALTH CARE EDUCATION/TRAINING PROGRAM

## 2024-03-30 PROCEDURE — 82947 ASSAY GLUCOSE BLOOD QUANT: CPT

## 2024-03-30 PROCEDURE — A9575 INJ GADOTERATE MEGLUMI 0.1ML: HCPCS | Performed by: PHYSICIAN ASSISTANT

## 2024-03-30 PROCEDURE — 2500000004 HC RX 250 GENERAL PHARMACY W/ HCPCS (ALT 636 FOR OP/ED)

## 2024-03-30 PROCEDURE — 2550000001 HC RX 255 CONTRASTS: Performed by: PHYSICIAN ASSISTANT

## 2024-03-30 PROCEDURE — 99291 CRITICAL CARE FIRST HOUR: CPT | Performed by: STUDENT IN AN ORGANIZED HEALTH CARE EDUCATION/TRAINING PROGRAM

## 2024-03-30 PROCEDURE — 37799 UNLISTED PX VASCULAR SURGERY: CPT

## 2024-03-30 PROCEDURE — 84132 ASSAY OF SERUM POTASSIUM: CPT | Performed by: NURSE PRACTITIONER

## 2024-03-30 PROCEDURE — 2020000001 HC ICU ROOM DAILY

## 2024-03-30 PROCEDURE — 82330 ASSAY OF CALCIUM: CPT

## 2024-03-30 PROCEDURE — 90937 HEMODIALYSIS REPEATED EVAL: CPT

## 2024-03-30 PROCEDURE — 85027 COMPLETE CBC AUTOMATED: CPT | Performed by: NURSE PRACTITIONER

## 2024-03-30 PROCEDURE — 94003 VENT MGMT INPAT SUBQ DAY: CPT

## 2024-03-30 PROCEDURE — 2500000004 HC RX 250 GENERAL PHARMACY W/ HCPCS (ALT 636 FOR OP/ED): Mod: JZ | Performed by: NURSE PRACTITIONER

## 2024-03-30 PROCEDURE — 2500000004 HC RX 250 GENERAL PHARMACY W/ HCPCS (ALT 636 FOR OP/ED): Performed by: STUDENT IN AN ORGANIZED HEALTH CARE EDUCATION/TRAINING PROGRAM

## 2024-03-30 PROCEDURE — 2500000001 HC RX 250 WO HCPCS SELF ADMINISTERED DRUGS (ALT 637 FOR MEDICARE OP): Performed by: PHYSICIAN ASSISTANT

## 2024-03-30 PROCEDURE — 36430 TRANSFUSION BLD/BLD COMPNT: CPT

## 2024-03-30 PROCEDURE — 2500000001 HC RX 250 WO HCPCS SELF ADMINISTERED DRUGS (ALT 637 FOR MEDICARE OP)

## 2024-03-30 PROCEDURE — P9073 PLATELETS PHERESIS PATH REDU: HCPCS

## 2024-03-30 PROCEDURE — 90945 DIALYSIS ONE EVALUATION: CPT

## 2024-03-30 PROCEDURE — 85520 HEPARIN ASSAY: CPT

## 2024-03-30 PROCEDURE — 94799 UNLISTED PULMONARY SVC/PX: CPT

## 2024-03-30 PROCEDURE — 85384 FIBRINOGEN ACTIVITY: CPT | Performed by: PHYSICIAN ASSISTANT

## 2024-03-30 PROCEDURE — 99024 POSTOP FOLLOW-UP VISIT: CPT

## 2024-03-30 PROCEDURE — 83735 ASSAY OF MAGNESIUM: CPT | Performed by: NURSE PRACTITIONER

## 2024-03-30 PROCEDURE — 70553 MRI BRAIN STEM W/O & W/DYE: CPT | Performed by: RADIOLOGY

## 2024-03-30 PROCEDURE — P9047 ALBUMIN (HUMAN), 25%, 50ML: HCPCS | Mod: JZ

## 2024-03-30 PROCEDURE — 85045 AUTOMATED RETICULOCYTE COUNT: CPT | Performed by: PHYSICIAN ASSISTANT

## 2024-03-30 PROCEDURE — 2500000005 HC RX 250 GENERAL PHARMACY W/O HCPCS: Performed by: STUDENT IN AN ORGANIZED HEALTH CARE EDUCATION/TRAINING PROGRAM

## 2024-03-30 PROCEDURE — 83010 ASSAY OF HAPTOGLOBIN QUANT: CPT | Performed by: PHYSICIAN ASSISTANT

## 2024-03-30 RX ORDER — GADOTERATE MEGLUMINE 376.9 MG/ML
15 INJECTION INTRAVENOUS
Status: COMPLETED | OUTPATIENT
Start: 2024-03-30 | End: 2024-03-30

## 2024-03-30 RX ORDER — HEPARIN SODIUM 1000 [USP'U]/ML
INJECTION, SOLUTION INTRAVENOUS; SUBCUTANEOUS
Status: DISPENSED
Start: 2024-03-30 | End: 2024-03-30

## 2024-03-30 RX ADMIN — THIAMINE HYDROCHLORIDE 100 MG: 100 INJECTION, SOLUTION INTRAMUSCULAR; INTRAVENOUS at 08:43

## 2024-03-30 RX ADMIN — DEXAMETHASONE SODIUM PHOSPHATE 13.2 MG: 4 INJECTION, SOLUTION INTRA-ARTICULAR; INTRALESIONAL; INTRAMUSCULAR; INTRAVENOUS; SOFT TISSUE at 06:25

## 2024-03-30 RX ADMIN — DEXAMETHASONE SODIUM PHOSPHATE 13.2 MG: 4 INJECTION, SOLUTION INTRA-ARTICULAR; INTRALESIONAL; INTRAMUSCULAR; INTRAVENOUS; SOFT TISSUE at 19:02

## 2024-03-30 RX ADMIN — INSULIN HUMAN 6.5 UNITS/HR: 1 INJECTION, SOLUTION INTRAVENOUS at 05:00

## 2024-03-30 RX ADMIN — ALBUMIN HUMAN 25 G: 0.25 SOLUTION INTRAVENOUS at 05:02

## 2024-03-30 RX ADMIN — MIDODRINE HYDROCHLORIDE 15 MG: 5 TABLET ORAL at 12:07

## 2024-03-30 RX ADMIN — SODIUM PHOSPHATE, MONOBASIC, MONOHYDRATE AND SODIUM PHOSPHATE, DIBASIC, ANHYDROUS 15 MMOL: 142; 276 INJECTION, SOLUTION INTRAVENOUS at 06:45

## 2024-03-30 RX ADMIN — CALCIUM GLUCONATE 1 G: 20 INJECTION, SOLUTION INTRAVENOUS at 05:53

## 2024-03-30 RX ADMIN — MIDODRINE HYDROCHLORIDE 15 MG: 5 TABLET ORAL at 19:02

## 2024-03-30 RX ADMIN — GADOTERATE MEGLUMINE 26 ML: 376.9 INJECTION INTRAVENOUS at 13:46

## 2024-03-30 RX ADMIN — HEPARIN SODIUM 1600 UNITS/HR: 10000 INJECTION, SOLUTION INTRAVENOUS at 12:15

## 2024-03-30 RX ADMIN — ESOMEPRAZOLE MAGNESIUM 40 MG: 40 FOR SUSPENSION ORAL at 06:20

## 2024-03-30 ASSESSMENT — PAIN - FUNCTIONAL ASSESSMENT
PAIN_FUNCTIONAL_ASSESSMENT: CPOT (CRITICAL CARE PAIN OBSERVATION TOOL)

## 2024-03-30 ASSESSMENT — PAIN SCALES - GENERAL
PAINLEVEL_OUTOF10: 0 - NO PAIN
PAINLEVEL_OUTOF10: 0 - NO PAIN

## 2024-03-30 NOTE — PROGRESS NOTES
"Jason Hollins is a 63 y.o. male on day 25 of admission presenting with Soft tissue sarcoma (CMS/HCC).    Subjective     No acute changes overnight.     Objective     Physical Exam  Vitals reviewed.   Constitutional:       Appearance: He is ill-appearing.      Comments: Intubated.   HENT:      Head:      Comments: Edematous face/head/neck     Mouth/Throat:      Mouth: Mucous membranes are moist.      Comments: ETT in place. OG tube in place with TFs infusing.   Eyes:      General: Scleral icterus present.      Pupils: Pupils are equal, round, and reactive to light.      Comments: Conjunctival edema. Subconjunctival hemorrhage.   Cardiovascular:      Rate and Rhythm: Normal rate and regular rhythm.      Pulses:           Radial pulses are 1+ on the right side and 1+ on the left side.        Dorsalis pedis pulses are 1+ on the right side and 1+ on the left side.      Heart sounds: Normal heart sounds.   Pulmonary:      Comments: Mechanically ventilated via ETT. Diminished breath sounds bilaterally. Equal chest rise. Current vent settings AC/VC 50%, 550, 24, +10.  Abdominal:      General: Abdomen is protuberant. Bowel sounds are absent. There is distension.      Palpations: Abdomen is soft.      Comments: Obese abdomen.   Genitourinary:     Comments: Penile/scrotal edema.  Musculoskeletal:      Comments: Generalized weakness.   Skin:     General: Skin is warm and dry.      Comments: Anasarca. Right flap site with wound VAC, no output. ANDERSON x 3 all with serosanguinous output.   Neurological:      GCS: GCS eye subscore is 3. GCS verbal subscore is 1. GCS motor subscore is 6.      Comments: Intubated. +cough and gag. Patient opening eyes to verbal stimulation and following simple commands. Weak bilateral hand grasps and wiggles toes in left foot. Unable to move RLE.      Last Recorded Vitals  Blood pressure 132/53, pulse 67, temperature 36.3 °C (97.3 °F), temperature source Core, resp. rate (!) 28, height 1.778 m (5' 10\"), " weight 129 kg (283 lb 15.2 oz), SpO2 94 %.    VS over last 24h  Heart Rate:  [58-80]   Temp:  [36.3 °C (97.3 °F)-36.8 °C (98.2 °F)]   Resp:  [16-33]   Weight:  [129 kg (283 lb 15.2 oz)]   SpO2:  [90 %-99 %]      Scheduled medications  dexAMETHasone, 13.2 mg, intravenous, q12h  esomeprazole, 40 mg, nasogastric tube, Daily before breakfast  oxygen, , inhalation, Continuous - Inhalation  sodium phosphate, 15 mmol, intravenous, Once  thiamine, 100 mg, intravenous, Daily      Continuous medications  dexmedeTOMIDine, 0.1-1.5 mcg/kg/hr, Last Rate: 0.1 mcg/kg/hr (03/30/24 0300)  heparin, 0-4,000 Units/hr, Last Rate: 1,600 Units/hr (03/30/24 0300)  insulin regular infusion for cardiac surgery, 0-15 Units/hr, Last Rate: 6.5 Units/hr (03/30/24 0637)  lactated Ringer's, 5 mL/hr, Last Rate: 5 mL/hr (03/30/24 0200)  norepinephrine, 0.01-0.5 mcg/kg/min (Dosing Weight), Last Rate: 0.02 mcg/kg/min (03/30/24 0300)  PrismaSol 4/2.5, 2,400 mL/hr, Last Rate: 2,400 mL/hr (03/29/24 2059)      PRN medications  PRN medications: alteplase, calcium gluconate, calcium gluconate, dextrose **OR** glucagon, magnesium sulfate, magnesium sulfate        Assessment/Plan   Jason Hollins is a 63 y.o. 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 Hawkins and Dr. Smith on 3/5/24.  Post operative course was uncomplicated and patient was on RNF until 3/20 for prolonged bed rest to avoid hip flexion to maintain flap integrity.  Patient was on prophylactic lovenox while on bedrest.     3/20: Cardiopulmonary arrest s/p CPR with ROSC after TPA, overnight started on heparin, epo, increased pressor requirements, increased swelling at flap site.     3/21: Hemorrhagic shock, MTP. Firm and large right flap site. Return to OR s/p Exploration of right thigh wound, Evacuation  of hematoma. Suture ligation of multiple bleeding vessel and several points in gluteal area.     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 Hawkins and Dr. Smith on 3/5/24 s/p cardiopulmonary arrest with ROSC 3/20. Documented to have followed commands off sedation. CT head 3/22 without acute process. Weaned off cisatracurium and propofol overnight 3/23-3/24. Ketamine weaned off 3/25 and Fentanyl weaned off 3/26 am. Repeat CT Head 3/26 without acute process. Neuro exam - Patient opening eyes to verbal stimulation and following simple commands. Weak bilateral hand grasps and wiggles toes in left foot. Unable to move RLE.   - ongoing neuro and pain assessments  - off all sedation  - MRI ordered to assess for ischemic changes after arrest   - PT/OT -> PROM  - no need for soft wrist restraints at this time -> continue to reassess     CV: History of HTN, HLD. Acute cardiopulmonary arrest 2/2 to possible massive PE vs massive STEMI, received multiple rounds of CPR and one defibrillation.  Sustained ROSC achieved after TPA bolus. On high dose levophed, epinephrine, vaso, angioT2. Plan on 3/21 was for ECMO which was aborted secondary to large hemhorrge at right flap site. Had MTP and taken OR with 5L evacuated. ECHO 3/21: Normal LV, Mod enlarged RV, slight suggestion of a Townsend's sign / RV strain, low normal RV function. ECHO 3/22 with hyperdynamic LV. New onset Afib with RVR overnight 3/22-3/23, dosed with 150mg amio x2, started infusion at 1mg/min thereafter. AT2 weaned off. Amio infusion discontinued 3/25. Lactate downtrending. Vaso and epi weaned off. Remains in NSR. iEpo weaned off 3/27.  Levo at 0.02  - continuous EKG/abp/cvp monitoring  - Goal map range 65-90  - wean levo as tolerated  - added midodrine 15mg q8h  -  Stress dose steroids with decadron  - continue heparin infusion  - continue to trend lactate  - continue aggressive fluid removal with CVVH as hemodynamics permit  - repeat TTE -> essentially unchanged     PULM: Arrived to SICU intubated julio c-CPR. Concern for massive PE. Started on iEpo, currently on 0.05. Hypoxic respiratory failure. Severe ARDS. ETT exchanged 3/23. CT Chest 3/26 with interval JOHN consolidative/ground glass opacity. Currently on AC/VC 50%, 550, 24, +10.   - ARDSnet lung protective strategies  - Wean FiO2 as tolerated  - ABGs prn  - additional pulm toilet prn  - daily CXR  - ENT consulted for trach placement -> OR Monday 4/1    ENT: Had epistaxis 3/23, completed afrin bid x3 days  - monitor, low threshold to consult ENT  - avoid NG placement for now     GI: NPO. Shock liver, pancreatitis. Elevated TG. Elevated lactate. Consulted ACS for potential bowel ischemia as cause of persistent lactic acidosis. CT imaging 3/22 with evidence of pancreatitis. No acute surgical indication per ACS. RUQ US 3/22 with thickening of GB wall without cholelithiasis. CT A/P 3/26 negative for acute bleed. LFTs downtrending. Worsening hyperbilirubinemia. Amylase and lipase now WNL.   - continue to increase TFs to goal 45ml/hr  - prostat daily  - PPI for GI ppx  - Trend LFTs daily  - IV thiamine 100mg IV x7 days     : No history of renal disease. Baseline creatinine 0.6. Anuric RUTH. Elevated CK, downtrending. Metabolic acidosis, total body fluid overload, hyperkalemia. Started on CVVH 3/21, stopped bicarb infusion 3/22. Marino removed 3/24. Hyponatremia. Net negative 2.5L for past 24hrs.  - Nephrology following, appreciate recs  - continue CVVH -> goal negative 3-4L  - RFP BID and PRN  - Replete electrolytes judiciously  - Daily bladder scan     HEME: Patient was on ppx lovenox for DVT prophylaxis prior to cardiopulmonary arrest. Concern for massive PE patient received bolus of TPA during CPR. Started on heparin infusion  overnight given concern for PE. Hemorrhagic shock 3/21, MTP and back to OR s/p Exploration of right thigh woundEvacuation of hematoma. Suture ligation of multiple bleeding vessel and several points in gluteal area. Hematology consulted 3/22 to r/o HLH. Transfused 1 RBC overnight 3/22-3/23. Thrombocytopenia, coagulapathy, hypofibrinogenemia. LE Duplex 3/25 +acute occlusive R soleal DVT. Received 2u PRBC and 1u FFP on 3/26. Heparin infusion discontinued 3/26 to r/o HIT and transitioned to bival. PF4 negative, resumed heparin infuision 3/27.    - cbc, coags bid and prn  - fibrinogen daily  - continue heaprin infusion  - ongoing monitoring for s/s bleeding  - close surveillance of RLE and drains  - continue Decadron 13mg BID for suspicion of HLH (will need slow wean when ready)  - Vasc Med following, appreciate recs  - maintain active T&S (3/28)    ENDO: History of DM2. A1c 7.5%. TSH WNL 1/2024. Insulin infusion   - q4h BG checks     ID: Afebrile. Leukocytosis. On broad antimicrobial therapy. MRSA screen + 2/28/24. Pan cultures sent 3/22. Sputum NTF, +MRSA screen (completed 5 day course mupirocin), UCx and BCx negative. Completed 7 day course vanc/zosyn 3/27.   - temp q4h, wbc daily  - ongoing monitoring for s/s infection    Lines:  - right brachial arterial line (3/20)  - RIJ trialysis (3/27)  - left subclavian MAC with mini MAC (3/20)  - PIV x1     Dispo: Continue ICU care. Patient seen and discussed with ICU attending Dr. Inga SIMMONS Pad, DO  SICU phone [phone]

## 2024-03-30 NOTE — PROCEDURES
Nephrology entry     Saw and evaluated patient on CVVH    Remains anuric, on NE 0.02    Plan: Continue CVVH           RFP bid while on CVVH           Replete phos if low            Strict I and O charting, daily bladder scans     SW Dr Jonathon Baldwin MD   Nephrology fellow   Nephrology pager [phone]  Available via Epic Chat

## 2024-03-30 NOTE — PROGRESS NOTES
Department of Plastic and Reconstructive Surgery  Daily Progress Note    Patient Name: Jason Hollins  MRN: [id number]  Date:  03/30/24     Subjective  Patient remains critically ill in CTICU, intubated with CRRT. Pt able to follow commands weakly, responds to verbal stimuli and tracks across room with eyes. Low dose norepi currently running 2/2 fluid removal from CRRT. Hgb remains stable.     Objective  Physical Exam   Constitutional: Intubated, sedation weaned off. Opens eyes to verbal stimuli and tracks around room.   ENMT: MMM, ETT in place, OG tube in place  Cardiovascular: RRR on telemetry monitor  Respiratory/Thorax: ETT in place   Gastrointestinal: abdomen soft, non distended  Genitourinary: CVVHD until weaned from pressors   Musculoskeletal: Minimal purposeful movement appreciated  Extremities: Generalized pitting edema, Right 4Fr CFA and 7Fr CFV sheath in place  RLE: right thigh edematous, large, no bruising noted, soft and compressible, incisional wound vac in place, holding suction at -75 mmHG, no leak or alarm present, ANDERSON drain x 3 with dark serous output  BLE neurovascular intact, cap refill < 2 sec, +df/pf, DP/PT/ radial pulses 2+, no drainage noted.   Neurological:  HANNAH. intubated   Psychological: HANNAH, intubated  Skin: Warm, dry, intact  Diagnostics   Results for orders placed or performed during the hospital encounter of 03/05/24 (from the past 24 hour(s))   POCT GLUCOSE   Result Value Ref Range    POCT Glucose 226 (H) 74 - 99 mg/dL   POCT GLUCOSE   Result Value Ref Range    POCT Glucose 238 (H) 74 - 99 mg/dL   Magnesium   Result Value Ref Range    Magnesium 2.76 (H) 1.60 - 2.40 mg/dL   Renal Function Panel   Result Value Ref Range    Glucose 206 (H) 74 - 99 mg/dL    Sodium 135 (L) 136 - 145 mmol/L    Potassium 4.3 3.5 - 5.3 mmol/L    Chloride 101 98 - 107 mmol/L    Bicarbonate 23 21 - 32 mmol/L    Anion Gap 15 10 - 20 mmol/L    Urea Nitrogen 54 (H) 6 - 23 mg/dL    Creatinine 2.13 (H) 0.50 - 1.30 mg/dL     eGFR 34 (L) >60 mL/min/1.73m*2    Calcium 8.2 (L) 8.6 - 10.6 mg/dL    Phosphorus 2.2 (L) 2.5 - 4.9 mg/dL    Albumin 3.5 3.4 - 5.0 g/dL   Calcium, ionized   Result Value Ref Range    POCT Calcium, Ionized 1.07 (L) 1.1 - 1.33 mmol/L   POCT GLUCOSE   Result Value Ref Range    POCT Glucose 201 (H) 74 - 99 mg/dL   POCT GLUCOSE   Result Value Ref Range    POCT Glucose 192 (H) 74 - 99 mg/dL   POCT GLUCOSE   Result Value Ref Range    POCT Glucose 192 (H) 74 - 99 mg/dL   CBC   Result Value Ref Range    WBC 13.9 (H) 4.4 - 11.3 x10*3/uL    nRBC 1.8 (H) 0.0 - 0.0 /100 WBCs    RBC 2.70 (L) 4.50 - 5.90 x10*6/uL    Hemoglobin 8.1 (L) 13.5 - 17.5 g/dL    Hematocrit 22.8 (L) 41.0 - 52.0 %    MCV 84 80 - 100 fL    MCH 30.0 26.0 - 34.0 pg    MCHC 35.5 32.0 - 36.0 g/dL    RDW 18.4 (H) 11.5 - 14.5 %    Platelets 44 (L) 150 - 450 x10*3/uL   POCT GLUCOSE   Result Value Ref Range    POCT Glucose 158 (H) 74 - 99 mg/dL   POCT GLUCOSE   Result Value Ref Range    POCT Glucose 173 (H) 74 - 99 mg/dL   POCT GLUCOSE   Result Value Ref Range    POCT Glucose 165 (H) 74 - 99 mg/dL   POCT GLUCOSE   Result Value Ref Range    POCT Glucose 179 (H) 74 - 99 mg/dL   Blood Gas Arterial Full Panel   Result Value Ref Range    POCT pH, Arterial 7.48 (H) 7.38 - 7.42 pH    POCT pCO2, Arterial 31 (L) 38 - 42 mm Hg    POCT pO2, Arterial 83 (L) 85 - 95 mm Hg    POCT SO2, Arterial 98 94 - 100 %    POCT Oxy Hemoglobin, Arterial 95.6 94.0 - 98.0 %    POCT Hematocrit Calculated, Arterial 26.0 (L) 41.0 - 52.0 %    POCT Sodium, Arterial 136 136 - 145 mmol/L    POCT Potassium, Arterial 5.1 3.5 - 5.3 mmol/L    POCT Chloride, Arterial 100 98 - 107 mmol/L    POCT Ionized Calcium, Arterial 1.11 1.10 - 1.33 mmol/L    POCT Glucose, Arterial 197 (H) 74 - 99 mg/dL    POCT Lactate, Arterial 2.3 (H) 0.4 - 2.0 mmol/L    POCT Base Excess, Arterial -0.1 -2.0 - 3.0 mmol/L    POCT HCO3 Calculated, Arterial 23.1 22.0 - 26.0 mmol/L    POCT Hemoglobin, Arterial 8.5 (L) 13.5 - 17.5 g/dL     POCT Anion Gap, Arterial 18 10 - 25 mmo/L    Patient Temperature 37.0 degrees Celsius    FiO2 50 %   Magnesium   Result Value Ref Range    Magnesium 2.72 (H) 1.60 - 2.40 mg/dL   Renal Function Panel   Result Value Ref Range    Glucose 195 (H) 74 - 99 mg/dL    Sodium 135 (L) 136 - 145 mmol/L    Potassium 4.8 3.5 - 5.3 mmol/L    Chloride 101 98 - 107 mmol/L    Bicarbonate 23 21 - 32 mmol/L    Anion Gap 16 10 - 20 mmol/L    Urea Nitrogen 54 (H) 6 - 23 mg/dL    Creatinine 2.05 (H) 0.50 - 1.30 mg/dL    eGFR 36 (L) >60 mL/min/1.73m*2    Calcium 8.4 (L) 8.6 - 10.6 mg/dL    Phosphorus 2.3 (L) 2.5 - 4.9 mg/dL    Albumin 3.9 3.4 - 5.0 g/dL   Calcium, ionized   Result Value Ref Range    POCT Calcium, Ionized 1.06 (L) 1.1 - 1.33 mmol/L   CBC   Result Value Ref Range    WBC 14.5 (H) 4.4 - 11.3 x10*3/uL    nRBC 1.9 (H) 0.0 - 0.0 /100 WBCs    RBC 2.74 (L) 4.50 - 5.90 x10*6/uL    Hemoglobin 8.1 (L) 13.5 - 17.5 g/dL    Hematocrit 23.1 (L) 41.0 - 52.0 %    MCV 84 80 - 100 fL    MCH 29.6 26.0 - 34.0 pg    MCHC 35.1 32.0 - 36.0 g/dL    RDW 18.6 (H) 11.5 - 14.5 %    Platelets 35 (LL) 150 - 450 x10*3/uL   POCT GLUCOSE   Result Value Ref Range    POCT Glucose 205 (H) 74 - 99 mg/dL   POCT GLUCOSE   Result Value Ref Range    POCT Glucose 216 (H) 74 - 99 mg/dL   POCT GLUCOSE   Result Value Ref Range    POCT Glucose 187 (H) 74 - 99 mg/dL   Blood Gas Arterial Full Panel   Result Value Ref Range    POCT pH, Arterial 7.44 (H) 7.38 - 7.42 pH    POCT pCO2, Arterial 35 (L) 38 - 42 mm Hg    POCT pO2, Arterial 75 (L) 85 - 95 mm Hg    POCT SO2, Arterial 97 94 - 100 %    POCT Oxy Hemoglobin, Arterial 94.6 94.0 - 98.0 %    POCT Hematocrit Calculated, Arterial 25.0 (L) 41.0 - 52.0 %    POCT Sodium, Arterial 134 (L) 136 - 145 mmol/L    POCT Potassium, Arterial 4.7 3.5 - 5.3 mmol/L    POCT Chloride, Arterial 101 98 - 107 mmol/L    POCT Ionized Calcium, Arterial 1.09 (L) 1.10 - 1.33 mmol/L    POCT Glucose, Arterial 168 (H) 74 - 99 mg/dL    POCT Lactate,  Arterial 2.5 (H) 0.4 - 2.0 mmol/L    POCT Base Excess, Arterial -0.2 -2.0 - 3.0 mmol/L    POCT HCO3 Calculated, Arterial 23.8 22.0 - 26.0 mmol/L    POCT Hemoglobin, Arterial 8.4 (L) 13.5 - 17.5 g/dL    POCT Anion Gap, Arterial 14 10 - 25 mmo/L    Patient Temperature 37.0 degrees Celsius    FiO2 50 %   Heparin Assay, UFH   Result Value Ref Range    Heparin Unfractionated 0.3 See Comment Below for Therapeutic Ranges IU/mL   POCT GLUCOSE   Result Value Ref Range    POCT Glucose 148 (H) 74 - 99 mg/dL   POCT GLUCOSE   Result Value Ref Range    POCT Glucose 124 (H) 74 - 99 mg/dL   Blood Gas Arterial Full Panel   Result Value Ref Range    POCT pH, Arterial 7.43 (H) 7.38 - 7.42 pH    POCT pCO2, Arterial 36 (L) 38 - 42 mm Hg    POCT pO2, Arterial 107 (H) 85 - 95 mm Hg    POCT SO2, Arterial 98 94 - 100 %    POCT Oxy Hemoglobin, Arterial 96.1 94.0 - 98.0 %    POCT Hematocrit Calculated, Arterial 25.0 (L) 41.0 - 52.0 %    POCT Sodium, Arterial 135 (L) 136 - 145 mmol/L    POCT Potassium, Arterial 4.8 3.5 - 5.3 mmol/L    POCT Chloride, Arterial 101 98 - 107 mmol/L    POCT Ionized Calcium, Arterial 1.11 1.10 - 1.33 mmol/L    POCT Glucose, Arterial 107 (H) 74 - 99 mg/dL    POCT Lactate, Arterial 2.3 (H) 0.4 - 2.0 mmol/L    POCT Base Excess, Arterial -0.3 -2.0 - 3.0 mmol/L    POCT HCO3 Calculated, Arterial 23.9 22.0 - 26.0 mmol/L    POCT Hemoglobin, Arterial 8.4 (L) 13.5 - 17.5 g/dL    POCT Anion Gap, Arterial 15 10 - 25 mmo/L    Patient Temperature 37.0 degrees Celsius    FiO2 50 %    Peep CHM2O 10.0 cm H2O   POCT GLUCOSE   Result Value Ref Range    POCT Glucose 122 (H) 74 - 99 mg/dL     Current Medications  Scheduled medications  dexAMETHasone, 13.2 mg, intravenous, q12h  esomeprazole, 40 mg, nasogastric tube, Daily before breakfast  heparin, , ,   midodrine, 15 mg, orogastric tube, q8h  oxygen, , inhalation, Continuous - Inhalation  thiamine, 100 mg, intravenous, Daily      Continuous medications  heparin, 0-4,000 Units/hr, Last  Rate: 1,600 Units/hr (03/30/24 1215)  insulin regular infusion for cardiac surgery, 0-15 Units/hr, Last Rate: 2.5 Units/hr (03/30/24 1223)  lactated Ringer's, 5 mL/hr, Last Rate: 5 mL/hr (03/30/24 1100)  norepinephrine, 0.01-0.5 mcg/kg/min (Dosing Weight), Last Rate: 0.01 mcg/kg/min (03/30/24 1140)  PrismaSol 4/2.5, 2,400 mL/hr, Last Rate: 2,400 mL/hr (03/29/24 2059)      PRN medications  PRN medications: alteplase, calcium gluconate, calcium gluconate, dextrose **OR** glucagon, heparin, magnesium sulfate, magnesium sulfate     Assessment  Jason Hollins 63 y.o. 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 Hawkins and Dr. Smith on 3/5/24.  Post operative course was uncomplicated and patient was on RNF until 3/20 for prolonged bed rest to avoid hip flexion to maintain flap integrity.  Patient was on prophylactic lovenox while on bedrest.    3/20: Now s/p CPR with ROSC after TPA for assumed large PE, overnight started on heparin, epo, increased pressor requirements, increased swelling at flap site.   3/21: MTP transfusions. Return to OR for exploration of R Flap site now s/p Exploration of right thigh wound evacuation of hematoma. Suture ligation of multiple bleeding vessel and several points in gluteal area with Dr. Smith.  3/26: 3 drains in place with continued output but output is slowly downtrending, wound vac remains with no output    Plan/Recommendations  S/p Exploration of right thigh wound evacuation of hematoma 3/21. Suture ligation of multiple bleeding vessel and several points in gluteal area:  A/P:   - Remains critically ill in CTICU       ·  remains intubated, without sedation       ·  Levophed resumed PM 3/27       ·  CRRT       ·  Heparin gtt resumed 3/27 after HIIT testing negative  - Maintain incisional  wound vac to right gluteal/thigh area x10-14 days post-op (4/4)       ·  Settings: -75mmHg low continuous suction        ·  Notify plastic surgery with any concerns of leak or obstruction   - Monitor right thigh for worsening s/s of active bleeding  - Continue ANDERSON drain care per nursing      ·  Strip drain tubing TID and PRN     ·  Monitor and record output q8h      ·  Keep drain sites C/D/I with daily drain dressing changes      ·  Notify plastics if ANDERSON drain output exceeds > 100 ml/hr in one drain or > 300 ml/hr collectively     ·  No acute signs of bleeding   - Appreciate remaining care per CTICU/SICU  - Plastics to follow    Patient and plan discussed with Dr. Rodney PA-C  Plastic and Reconstructive Surgery  Epic chat, Pager [phone], Team phones: [phone]

## 2024-03-31 ENCOUNTER — APPOINTMENT (OUTPATIENT)
Dept: RADIOLOGY | Facility: HOSPITAL | Age: 64
DRG: 003 | End: 2024-03-31
Payer: COMMERCIAL

## 2024-03-31 LAB
ABO GROUP (TYPE) IN BLOOD: NORMAL
ALBUMIN SERPL BCP-MCNC: 3.7 G/DL (ref 3.4–5)
ALBUMIN SERPL BCP-MCNC: 3.7 G/DL (ref 3.4–5)
ALBUMIN SERPL BCP-MCNC: 3.8 G/DL (ref 3.4–5)
ANION GAP BLDA CALCULATED.4IONS-SCNC: 12 MMO/L (ref 10–25)
ANION GAP BLDA CALCULATED.4IONS-SCNC: 9 MMO/L (ref 10–25)
ANION GAP BLDA CALCULATED.4IONS-SCNC: ABNORMAL MMOL/L
ANION GAP SERPL CALC-SCNC: 15 MMOL/L (ref 10–20)
ANION GAP SERPL CALC-SCNC: 17 MMOL/L (ref 10–20)
ANION GAP SERPL CALC-SCNC: 17 MMOL/L (ref 10–20)
ANTIBODY SCREEN: NORMAL
APTT PPP: 30 SECONDS (ref 27–38)
BASE EXCESS BLDA CALC-SCNC: -0.2 MMOL/L (ref -2–3)
BASE EXCESS BLDA CALC-SCNC: 0.5 MMOL/L (ref -2–3)
BASE EXCESS BLDA CALC-SCNC: 1.6 MMOL/L (ref -2–3)
BLOOD EXPIRATION DATE: NORMAL
BLOOD EXPIRATION DATE: NORMAL
BODY TEMPERATURE: 37 DEGREES CELSIUS
BUN SERPL-MCNC: 59 MG/DL (ref 6–23)
BUN SERPL-MCNC: 61 MG/DL (ref 6–23)
BUN SERPL-MCNC: 62 MG/DL (ref 6–23)
C DIF TOX TCDA+TCDB STL QL NAA+PROBE: NOT DETECTED
CA-I BLD-SCNC: 1.07 MMOL/L (ref 1.1–1.33)
CA-I BLD-SCNC: 1.08 MMOL/L (ref 1.1–1.33)
CA-I BLDA-SCNC: 1.08 MMOL/L (ref 1.1–1.33)
CA-I BLDA-SCNC: 1.08 MMOL/L (ref 1.1–1.33)
CA-I BLDA-SCNC: 1.13 MMOL/L (ref 1.1–1.33)
CALCIUM SERPL-MCNC: 8.3 MG/DL (ref 8.6–10.6)
CALCIUM SERPL-MCNC: 8.4 MG/DL (ref 8.6–10.6)
CALCIUM SERPL-MCNC: 8.5 MG/DL (ref 8.6–10.6)
CHLORIDE BLDA-SCNC: 102 MMOL/L (ref 98–107)
CHLORIDE BLDA-SCNC: 103 MMOL/L (ref 98–107)
CHLORIDE BLDA-SCNC: 103 MMOL/L (ref 98–107)
CHLORIDE SERPL-SCNC: 100 MMOL/L (ref 98–107)
CHLORIDE SERPL-SCNC: 100 MMOL/L (ref 98–107)
CHLORIDE SERPL-SCNC: 102 MMOL/L (ref 98–107)
CO2 SERPL-SCNC: 23 MMOL/L (ref 21–32)
CO2 SERPL-SCNC: 25 MMOL/L (ref 21–32)
CO2 SERPL-SCNC: 25 MMOL/L (ref 21–32)
CREAT SERPL-MCNC: 1.93 MG/DL (ref 0.5–1.3)
CREAT SERPL-MCNC: 2.07 MG/DL (ref 0.5–1.3)
CREAT SERPL-MCNC: 2.18 MG/DL (ref 0.5–1.3)
DISPENSE STATUS: NORMAL
DISPENSE STATUS: NORMAL
EGFRCR SERPLBLD CKD-EPI 2021: 33 ML/MIN/1.73M*2
EGFRCR SERPLBLD CKD-EPI 2021: 36 ML/MIN/1.73M*2
EGFRCR SERPLBLD CKD-EPI 2021: 39 ML/MIN/1.73M*2
ERYTHROCYTE [DISTWIDTH] IN BLOOD BY AUTOMATED COUNT: 19.3 % (ref 11.5–14.5)
ERYTHROCYTE [DISTWIDTH] IN BLOOD BY AUTOMATED COUNT: 19.7 % (ref 11.5–14.5)
ERYTHROCYTE [DISTWIDTH] IN BLOOD BY AUTOMATED COUNT: 20.6 % (ref 11.5–14.5)
ERYTHROCYTE [DISTWIDTH] IN BLOOD BY AUTOMATED COUNT: 20.7 % (ref 11.5–14.5)
GLUCOSE BLD MANUAL STRIP-MCNC: 135 MG/DL (ref 74–99)
GLUCOSE BLD MANUAL STRIP-MCNC: 140 MG/DL (ref 74–99)
GLUCOSE BLD MANUAL STRIP-MCNC: 140 MG/DL (ref 74–99)
GLUCOSE BLD MANUAL STRIP-MCNC: 142 MG/DL (ref 74–99)
GLUCOSE BLD MANUAL STRIP-MCNC: 149 MG/DL (ref 74–99)
GLUCOSE BLD MANUAL STRIP-MCNC: 153 MG/DL (ref 74–99)
GLUCOSE BLD MANUAL STRIP-MCNC: 169 MG/DL (ref 74–99)
GLUCOSE BLD MANUAL STRIP-MCNC: 172 MG/DL (ref 74–99)
GLUCOSE BLD MANUAL STRIP-MCNC: 190 MG/DL (ref 74–99)
GLUCOSE BLD MANUAL STRIP-MCNC: 235 MG/DL (ref 74–99)
GLUCOSE BLD MANUAL STRIP-MCNC: 238 MG/DL (ref 74–99)
GLUCOSE BLDA-MCNC: 120 MG/DL (ref 74–99)
GLUCOSE BLDA-MCNC: 142 MG/DL (ref 74–99)
GLUCOSE BLDA-MCNC: 153 MG/DL (ref 74–99)
GLUCOSE SERPL-MCNC: 126 MG/DL (ref 74–99)
GLUCOSE SERPL-MCNC: 144 MG/DL (ref 74–99)
GLUCOSE SERPL-MCNC: 176 MG/DL (ref 74–99)
HAPTOGLOB SERPL-MCNC: 18 MG/DL (ref 37–246)
HCO3 BLDA-SCNC: 23.3 MMOL/L (ref 22–26)
HCO3 BLDA-SCNC: 24 MMOL/L (ref 22–26)
HCO3 BLDA-SCNC: 25.8 MMOL/L (ref 22–26)
HCT VFR BLD AUTO: 21 % (ref 41–52)
HCT VFR BLD AUTO: 21 % (ref 41–52)
HCT VFR BLD AUTO: 22.8 % (ref 41–52)
HCT VFR BLD AUTO: 22.9 % (ref 41–52)
HCT VFR BLD EST: 23 % (ref 41–52)
HCT VFR BLD EST: 23 % (ref 41–52)
HCT VFR BLD EST: 28 % (ref 41–52)
HGB BLD-MCNC: 7.4 G/DL (ref 13.5–17.5)
HGB BLD-MCNC: 7.5 G/DL (ref 13.5–17.5)
HGB BLDA-MCNC: 7.7 G/DL (ref 13.5–17.5)
HGB BLDA-MCNC: 7.8 G/DL (ref 13.5–17.5)
HGB BLDA-MCNC: 9.3 G/DL (ref 13.5–17.5)
INHALED O2 CONCENTRATION: 50 %
INR PPP: 1.6 (ref 0.9–1.1)
LACTATE BLDA-SCNC: 1.6 MMOL/L (ref 0.4–2)
LACTATE BLDA-SCNC: 1.7 MMOL/L (ref 0.4–2)
LACTATE BLDA-SCNC: 2.3 MMOL/L (ref 0.4–2)
MAGNESIUM SERPL-MCNC: 2.66 MG/DL (ref 1.6–2.4)
MAGNESIUM SERPL-MCNC: 2.74 MG/DL (ref 1.6–2.4)
MAGNESIUM SERPL-MCNC: 2.79 MG/DL (ref 1.6–2.4)
MCH RBC QN AUTO: 29 PG (ref 26–34)
MCH RBC QN AUTO: 29.1 PG (ref 26–34)
MCH RBC QN AUTO: 29.8 PG (ref 26–34)
MCH RBC QN AUTO: 30.1 PG (ref 26–34)
MCHC RBC AUTO-ENTMCNC: 32.8 G/DL (ref 32–36)
MCHC RBC AUTO-ENTMCNC: 32.9 G/DL (ref 32–36)
MCHC RBC AUTO-ENTMCNC: 35.2 G/DL (ref 32–36)
MCHC RBC AUTO-ENTMCNC: 35.7 G/DL (ref 32–36)
MCV RBC AUTO: 84 FL (ref 80–100)
MCV RBC AUTO: 85 FL (ref 80–100)
MCV RBC AUTO: 88 FL (ref 80–100)
MCV RBC AUTO: 88 FL (ref 80–100)
NRBC BLD-RTO: 0.8 /100 WBCS (ref 0–0)
NRBC BLD-RTO: 0.8 /100 WBCS (ref 0–0)
NRBC BLD-RTO: 0.9 /100 WBCS (ref 0–0)
NRBC BLD-RTO: 0.9 /100 WBCS (ref 0–0)
OXYHGB MFR BLDA: 96 % (ref 94–98)
OXYHGB MFR BLDA: 96.5 % (ref 94–98)
OXYHGB MFR BLDA: 96.7 % (ref 94–98)
PCO2 BLDA: 32 MM HG (ref 38–42)
PCO2 BLDA: 33 MM HG (ref 38–42)
PCO2 BLDA: 38 MM HG (ref 38–42)
PEEP CMH2O: 10 CM H2O
PH BLDA: 7.44 PH (ref 7.38–7.42)
PH BLDA: 7.47 PH (ref 7.38–7.42)
PH BLDA: 7.47 PH (ref 7.38–7.42)
PHOSPHATE SERPL-MCNC: 3 MG/DL (ref 2.5–4.9)
PHOSPHATE SERPL-MCNC: 3.1 MG/DL (ref 2.5–4.9)
PHOSPHATE SERPL-MCNC: 3.2 MG/DL (ref 2.5–4.9)
PLATELET # BLD AUTO: 23 X10*3/UL (ref 150–450)
PLATELET # BLD AUTO: 25 X10*3/UL (ref 150–450)
PLATELET # BLD AUTO: 35 X10*3/UL (ref 150–450)
PLATELET # BLD AUTO: 37 X10*3/UL (ref 150–450)
PO2 BLDA: 127 MM HG (ref 85–95)
PO2 BLDA: 81 MM HG (ref 85–95)
PO2 BLDA: 96 MM HG (ref 85–95)
POTASSIUM BLDA-SCNC: 4.8 MMOL/L (ref 3.5–5.3)
POTASSIUM BLDA-SCNC: 5 MMOL/L (ref 3.5–5.3)
POTASSIUM BLDA-SCNC: 5.1 MMOL/L (ref 3.5–5.3)
POTASSIUM SERPL-SCNC: 5 MMOL/L (ref 3.5–5.3)
PRODUCT BLOOD TYPE: 5100
PRODUCT BLOOD TYPE: 6200
PRODUCT CODE: NORMAL
PRODUCT CODE: NORMAL
PROTHROMBIN TIME: 18 SECONDS (ref 9.8–12.8)
RBC # BLD AUTO: 2.48 X10*6/UL (ref 4.5–5.9)
RBC # BLD AUTO: 2.49 X10*6/UL (ref 4.5–5.9)
RBC # BLD AUTO: 2.58 X10*6/UL (ref 4.5–5.9)
RBC # BLD AUTO: 2.59 X10*6/UL (ref 4.5–5.9)
RH FACTOR (ANTIGEN D): NORMAL
SAO2 % BLDA: 100 % (ref 94–100)
SAO2 % BLDA: 100 % (ref 94–100)
SAO2 % BLDA: 98 % (ref 94–100)
SODIUM BLDA-SCNC: 132 MMOL/L (ref 136–145)
SODIUM BLDA-SCNC: 133 MMOL/L (ref 136–145)
SODIUM BLDA-SCNC: ABNORMAL MMOL/L
SODIUM SERPL-SCNC: 135 MMOL/L (ref 136–145)
SODIUM SERPL-SCNC: 137 MMOL/L (ref 136–145)
SODIUM SERPL-SCNC: 137 MMOL/L (ref 136–145)
UNIT ABO: NORMAL
UNIT ABO: NORMAL
UNIT NUMBER: NORMAL
UNIT NUMBER: NORMAL
UNIT RH: NORMAL
UNIT RH: NORMAL
UNIT VOLUME: 276
UNIT VOLUME: 297
WBC # BLD AUTO: 13.1 X10*3/UL (ref 4.4–11.3)
WBC # BLD AUTO: 13.8 X10*3/UL (ref 4.4–11.3)
WBC # BLD AUTO: 14.2 X10*3/UL (ref 4.4–11.3)
WBC # BLD AUTO: 15.1 X10*3/UL (ref 4.4–11.3)

## 2024-03-31 PROCEDURE — 2020000001 HC ICU ROOM DAILY

## 2024-03-31 PROCEDURE — 83735 ASSAY OF MAGNESIUM: CPT | Performed by: NURSE PRACTITIONER

## 2024-03-31 PROCEDURE — 94003 VENT MGMT INPAT SUBQ DAY: CPT

## 2024-03-31 PROCEDURE — 99233 SBSQ HOSP IP/OBS HIGH 50: CPT | Performed by: STUDENT IN AN ORGANIZED HEALTH CARE EDUCATION/TRAINING PROGRAM

## 2024-03-31 PROCEDURE — 30233S1 TRANSFUSION OF NONAUTOLOGOUS GLOBULIN INTO PERIPHERAL VEIN, PERCUTANEOUS APPROACH: ICD-10-PCS | Performed by: INTERNAL MEDICINE

## 2024-03-31 PROCEDURE — 2500000001 HC RX 250 WO HCPCS SELF ADMINISTERED DRUGS (ALT 637 FOR MEDICARE OP): Performed by: PHYSICIAN ASSISTANT

## 2024-03-31 PROCEDURE — 85027 COMPLETE CBC AUTOMATED: CPT | Performed by: PHYSICIAN ASSISTANT

## 2024-03-31 PROCEDURE — 82947 ASSAY GLUCOSE BLOOD QUANT: CPT

## 2024-03-31 PROCEDURE — 99232 SBSQ HOSP IP/OBS MODERATE 35: CPT

## 2024-03-31 PROCEDURE — 82330 ASSAY OF CALCIUM: CPT

## 2024-03-31 PROCEDURE — 2500000004 HC RX 250 GENERAL PHARMACY W/ HCPCS (ALT 636 FOR OP/ED)

## 2024-03-31 PROCEDURE — 37799 UNLISTED PX VASCULAR SURGERY: CPT

## 2024-03-31 PROCEDURE — 2500000004 HC RX 250 GENERAL PHARMACY W/ HCPCS (ALT 636 FOR OP/ED): Performed by: STUDENT IN AN ORGANIZED HEALTH CARE EDUCATION/TRAINING PROGRAM

## 2024-03-31 PROCEDURE — 2500000001 HC RX 250 WO HCPCS SELF ADMINISTERED DRUGS (ALT 637 FOR MEDICARE OP): Performed by: STUDENT IN AN ORGANIZED HEALTH CARE EDUCATION/TRAINING PROGRAM

## 2024-03-31 PROCEDURE — P9037 PLATE PHERES LEUKOREDU IRRAD: HCPCS

## 2024-03-31 PROCEDURE — 84132 ASSAY OF SERUM POTASSIUM: CPT | Performed by: PHYSICIAN ASSISTANT

## 2024-03-31 PROCEDURE — 84132 ASSAY OF SERUM POTASSIUM: CPT | Performed by: NURSE PRACTITIONER

## 2024-03-31 PROCEDURE — 71045 X-RAY EXAM CHEST 1 VIEW: CPT | Performed by: RADIOLOGY

## 2024-03-31 PROCEDURE — 85027 COMPLETE CBC AUTOMATED: CPT

## 2024-03-31 PROCEDURE — 2500000004 HC RX 250 GENERAL PHARMACY W/ HCPCS (ALT 636 FOR OP/ED): Mod: JZ | Performed by: STUDENT IN AN ORGANIZED HEALTH CARE EDUCATION/TRAINING PROGRAM

## 2024-03-31 PROCEDURE — 71045 X-RAY EXAM CHEST 1 VIEW: CPT

## 2024-03-31 PROCEDURE — 99291 CRITICAL CARE FIRST HOUR: CPT | Performed by: STUDENT IN AN ORGANIZED HEALTH CARE EDUCATION/TRAINING PROGRAM

## 2024-03-31 PROCEDURE — 36430 TRANSFUSION BLD/BLD COMPNT: CPT

## 2024-03-31 PROCEDURE — 85610 PROTHROMBIN TIME: CPT | Performed by: NURSE PRACTITIONER

## 2024-03-31 PROCEDURE — 86920 COMPATIBILITY TEST SPIN: CPT

## 2024-03-31 PROCEDURE — 2500000004 HC RX 250 GENERAL PHARMACY W/ HCPCS (ALT 636 FOR OP/ED): Mod: JZ | Performed by: NURSE PRACTITIONER

## 2024-03-31 PROCEDURE — 86901 BLOOD TYPING SEROLOGIC RH(D): CPT | Performed by: PHYSICIAN ASSISTANT

## 2024-03-31 PROCEDURE — 85027 COMPLETE CBC AUTOMATED: CPT | Performed by: NURSE PRACTITIONER

## 2024-03-31 PROCEDURE — 94799 UNLISTED PULMONARY SVC/PX: CPT

## 2024-03-31 PROCEDURE — 90945 DIALYSIS ONE EVALUATION: CPT

## 2024-03-31 PROCEDURE — 2500000001 HC RX 250 WO HCPCS SELF ADMINISTERED DRUGS (ALT 637 FOR MEDICARE OP)

## 2024-03-31 PROCEDURE — 90937 HEMODIALYSIS REPEATED EVAL: CPT

## 2024-03-31 PROCEDURE — 99024 POSTOP FOLLOW-UP VISIT: CPT

## 2024-03-31 PROCEDURE — 37799 UNLISTED PX VASCULAR SURGERY: CPT | Performed by: NURSE PRACTITIONER

## 2024-03-31 RX ORDER — DEXMEDETOMIDINE HYDROCHLORIDE 4 UG/ML
.1-1.5 INJECTION, SOLUTION INTRAVENOUS CONTINUOUS
Status: DISCONTINUED | OUTPATIENT
Start: 2024-03-31 | End: 2024-04-01

## 2024-03-31 RX ORDER — MIDODRINE HYDROCHLORIDE 10 MG/1
20 TABLET ORAL EVERY 8 HOURS
Status: DISCONTINUED | OUTPATIENT
Start: 2024-03-31 | End: 2024-04-07

## 2024-03-31 RX ORDER — FLUDROCORTISONE ACETATE 0.1 MG/1
0.1 TABLET ORAL EVERY 12 HOURS
Status: DISCONTINUED | OUTPATIENT
Start: 2024-03-31 | End: 2024-04-26

## 2024-03-31 RX ADMIN — DEXMEDETOMIDINE HYDROCHLORIDE 0.1 MCG/KG/HR: 400 INJECTION INTRAVENOUS at 06:04

## 2024-03-31 RX ADMIN — FLUDROCORTISONE ACETATE 0.1 MG: 0.1 TABLET ORAL at 15:07

## 2024-03-31 RX ADMIN — HYDROCORTISONE SODIUM SUCCINATE 50 MG: 100 INJECTION, POWDER, FOR SOLUTION INTRAMUSCULAR; INTRAVENOUS at 17:13

## 2024-03-31 RX ADMIN — IMMUNE GLOBULIN INFUSION (HUMAN) 50 G: 100 INJECTION, SOLUTION INTRAVENOUS; SUBCUTANEOUS at 17:23

## 2024-03-31 RX ADMIN — CALCIUM CHLORIDE, MAGNESIUM CHLORIDE, DEXTROSE MONOHYDRATE, LACTIC ACID, SODIUM CHLORIDE, SODIUM BICARBONATE AND POTASSIUM CHLORIDE 2400 ML/HR: 5.15; 2.03; 22; 5.4; 6.46; 3.09; .157 INJECTION INTRAVENOUS at 10:19

## 2024-03-31 RX ADMIN — THIAMINE HYDROCHLORIDE 100 MG: 100 INJECTION, SOLUTION INTRAMUSCULAR; INTRAVENOUS at 10:20

## 2024-03-31 RX ADMIN — CALCIUM GLUCONATE 1 G: 20 INJECTION, SOLUTION INTRAVENOUS at 06:04

## 2024-03-31 RX ADMIN — MIDODRINE HYDROCHLORIDE 20 MG: 5 TABLET ORAL at 10:20

## 2024-03-31 RX ADMIN — DEXAMETHASONE SODIUM PHOSPHATE 13.2 MG: 4 INJECTION, SOLUTION INTRA-ARTICULAR; INTRALESIONAL; INTRAMUSCULAR; INTRAVENOUS; SOFT TISSUE at 06:10

## 2024-03-31 RX ADMIN — MIDODRINE HYDROCHLORIDE 20 MG: 10 TABLET ORAL at 17:54

## 2024-03-31 RX ADMIN — MIDODRINE HYDROCHLORIDE 15 MG: 5 TABLET ORAL at 02:07

## 2024-03-31 RX ADMIN — ESOMEPRAZOLE MAGNESIUM 40 MG: 40 FOR SUSPENSION ORAL at 06:06

## 2024-03-31 ASSESSMENT — PAIN - FUNCTIONAL ASSESSMENT
PAIN_FUNCTIONAL_ASSESSMENT: CPOT (CRITICAL CARE PAIN OBSERVATION TOOL)
PAIN_FUNCTIONAL_ASSESSMENT: CPOT (CRITICAL CARE PAIN OBSERVATION TOOL)

## 2024-03-31 ASSESSMENT — PAIN SCALES - GENERAL: PAINLEVEL_OUTOF10: 0 - NO PAIN

## 2024-03-31 NOTE — PROGRESS NOTES
Jason Hollins is a 63 y.o. male on day 26 of admission presenting with Soft tissue sarcoma (CMS/HCC).    Subjective   Yesterday, patient had thrombocytopenia to 18 - was given 5pk platelets.  Hematology consulted for thrombocytopenia.  Heparin infusion stopped. Hemoglobin stable overnight.        Objective     Physical Exam  Vitals reviewed.   Constitutional:       Appearance: He is ill-appearing.      Comments: Intubated.   HENT:      Head:      Comments: Edematous face/head/neck     Mouth/Throat:      Mouth: Mucous membranes are moist.      Comments: ETT in place. OG tube in place with TFs infusing.   Eyes:      General: Scleral icterus present.      Pupils: Pupils are equal, round, and reactive to light.      Comments: Conjunctival edema. Subconjunctival hemorrhage.   Cardiovascular:      Rate and Rhythm: Normal rate and regular rhythm.      Pulses:           Radial pulses are 1+ on the right side and 1+ on the left side.        Dorsalis pedis pulses are 1+ on the right side and 1+ on the left side.      Heart sounds: Normal heart sounds.   Pulmonary:      Comments: Mechanically ventilated via ETT. Diminished breath sounds bilaterally. Equal chest rise. Current vent settings AC/VC 50%, 550, 24, +10.  Abdominal:      General: Abdomen is protuberant. Bowel sounds are absent. There is distension.      Palpations: Abdomen is soft.      Comments: Obese abdomen.   Genitourinary:     Comments: Penile/scrotal edema.  Musculoskeletal:      Comments: Generalized weakness.   Skin:     General: Skin is warm and dry.      Comments: Anasarca. Right flap site with wound VAC, no output. ANDERSON x 3 all with serosanguinous output.   Neurological:      GCS: GCS eye subscore is 3. GCS verbal subscore is 1. GCS motor subscore is 6.      Comments: Intubated. +cough and gag. Patient opening eyes to verbal stimulation and following simple commands. Weak bilateral hand grasps and wiggles toes in left foot. Unable to move RLE.      Last Recorded  "Vitals  Blood pressure 115/54, pulse 59, temperature 36.1 °C (97 °F), temperature source Temporal, resp. rate 17, height 1.778 m (5' 10\"), weight 125 kg (275 lb 5.7 oz), SpO2 98 %.    VS over last 24h  Heart Rate:  [57-87]   Temp:  [36.1 °C (97 °F)-36.7 °C (98.1 °F)]   Resp:  [17-39]   BP: (114-125)/(47-54)   Weight:  [125 kg (275 lb 5.7 oz)]   SpO2:  [89 %-99 %]      Scheduled medications  dexAMETHasone, 13.2 mg, intravenous, q12h  esomeprazole, 40 mg, nasogastric tube, Daily before breakfast  midodrine, 15 mg, orogastric tube, q8h  thiamine, 100 mg, intravenous, Daily      Continuous medications  dexmedeTOMIDine, 0.1-1.5 mcg/kg/hr, Last Rate: 0.1 mcg/kg/hr (03/31/24 0604)  [Held by provider] heparin, 0-4,000 Units/hr, Last Rate: Stopped (03/30/24 1545)  insulin regular infusion for cardiac surgery, 0-15 Units/hr, Last Rate: 4 Units/hr (03/31/24 0300)  lactated Ringer's, 5 mL/hr, Last Rate: 5 mL/hr (03/31/24 0000)  norepinephrine, 0.01-0.5 mcg/kg/min (Dosing Weight), Last Rate: 0.05 mcg/kg/min (03/31/24 0745)  PrismaSol 4/2.5, 2,400 mL/hr, Last Rate: 2,400 mL/hr (03/29/24 2059)  vasopressin, 0.01-0.03 Units/min      PRN medications  PRN medications: alteplase, calcium gluconate, calcium gluconate, dextrose **OR** glucagon, magnesium sulfate, magnesium sulfate, oxygen        Assessment/Plan   Jason Hollins is a 63 y.o. 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 Hawkins and Dr. Smith on 3/5/24.  Post operative course was uncomplicated and patient was on RNF until 3/20 for prolonged bed rest to avoid hip flexion to maintain flap integrity.  Patient was on prophylactic lovenox while on bedrest.     3/20: Cardiopulmonary arrest s/p CPR with ROSC after TPA, overnight started on heparin, epo, increased pressor requirements, " increased swelling at flap site.     3/21: Hemorrhagic shock, MTP. Firm and large right flap site. Return to OR s/p Exploration of right thigh wound, Evacuation of hematoma. Suture ligation of multiple bleeding vessel and several points in gluteal area.     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 Hawkins and Dr. Smith on 3/5/24 s/p cardiopulmonary arrest with ROSC 3/20. Documented to have followed commands off sedation. CT head 3/22 without acute process. Weaned off cisatracurium and propofol overnight 3/23-3/24. Ketamine weaned off 3/25 and Fentanyl weaned off 3/26 am. Repeat CT Head 3/26 without acute process. Neuro exam - Patient opening eyes to verbal stimulation and following simple commands. Weak bilateral hand grasps and wiggles toes in left foot. Unable to move RLE.   - ongoing neuro and pain assessments  - off all sedation  - MRI with minimal small vessel changes, overall no evidence of cerebral infarction  - PT/OT -> PROM  - no need for soft wrist restraints at this time -> continue to reassess     CV: History of HTN, HLD. Acute cardiopulmonary arrest 2/2 to possible massive PE vs massive STEMI, received multiple rounds of CPR and one defibrillation.  Sustained ROSC achieved after TPA bolus. On high dose levophed, epinephrine, vaso, angioT2. Plan on 3/21 was for ECMO which was aborted secondary to large hemhorrge at right flap site. Had MTP and taken OR with 5L evacuated. ECHO 3/21: Normal LV, Mod enlarged RV, slight suggestion of a Townsend's sign / RV strain, low normal RV function. ECHO 3/22 with hyperdynamic LV. New onset Afib with RVR overnight 3/22-3/23, dosed with 150mg amio x2, started infusion at 1mg/min thereafter. AT2 weaned off. Amio infusion discontinued 3/25. Lactate downtrending. Vaso and  epi weaned off. Remains in NSR. iEpo weaned off 3/27.  Patient acutely hypotensive this AM levo increased to 0.06, stop pulling fluid via CVVH.  - continuous EKG/abp/cvp monitoring  - Goal map range 65-90  - wean levo as tolerated  - Increase midodrine 20mg q8h  - Start florinef 0.1mg BID   - Stress dose steroids with decadron  - Holding heparin infusion   - continue to trend lactate  - continue aggressive fluid removal with CVVH as hemodynamics permit  - repeat TTE -> essentially unchanged     PULM: Arrived to SICU intubated julio c-CPR. Concern for massive PE. Started on iEpo, currently on 0.05. Hypoxic respiratory failure. Severe ARDS. ETT exchanged 3/23. CT Chest 3/26 with interval JOHN consolidative/ground glass opacity. Currently on AC/VC 50%, 550, 24, +10.   - ARDSnet lung protective strategies  - Wean FiO2 as tolerated  - ABGs prn  - additional pulm toilet prn  - daily CXR  - ENT consulted for trach placement -> OR Monday 4/1    ENT: Had epistaxis 3/23, completed afrin bid x3 days  - monitor, low threshold to consult ENT  - avoid NG placement for now     GI: NPO. Shock liver, pancreatitis. Elevated TG. Elevated lactate. Consulted ACS for potential bowel ischemia as cause of persistent lactic acidosis. CT imaging 3/22 with evidence of pancreatitis. No acute surgical indication per ACS. RUQ US 3/22 with thickening of GB wall without cholelithiasis. CT A/P 3/26 negative for acute bleed. LFTs downtrending. Worsening hyperbilirubinemia. Amylase and lipase now WNL.   - continue to increase TFs to goal 45ml/hr - NPO at MN for trach  - prostat daily  - PPI for GI ppx  - Trend LFTs daily  - IV thiamine 100mg IV x7 days     : No history of renal disease. Baseline creatinine 0.6. Anuric RUTH. Elevated CK, downtrending. Metabolic acidosis, total body fluid overload, hyperkalemia. Started on CVVH 3/21, stopped bicarb infusion 3/22. Marino removed 3/24. Hyponatremia. Net negative 2L for past 24hrs.  - Nephrology following,  appreciate recs  - continue CVVH -> pull fluid as hemodynamics allow  - RFP BID and PRN  - Replete electrolytes judiciously  - Daily bladder scan     HEME: Patient was on ppx lovenox for DVT prophylaxis prior to cardiopulmonary arrest. Concern for massive PE patient received bolus of TPA during CPR. Started on heparin infusion overnight given concern for PE. Hemorrhagic shock 3/21, MTP and back to OR s/p Exploration of right thigh woundEvacuation of hematoma. Suture ligation of multiple bleeding vessel and several points in gluteal area. Hematology consulted 3/22 to r/o HLH. Transfused 1 RBC overnight 3/22-3/23. Thrombocytopenia, coagulapathy, hypofibrinogenemia. LE Duplex 3/25 +acute occlusive R soleal DVT. Received 2u PRBC and 1u FFP on 3/26. Heparin infusion discontinued 3/26 to r/o HIT and transitioned to bival. PF4 negative, resumed heparin infuision 3/27.  Thrombocytopenia on 3/30 to 18, received 5pk, did not increment.  Heparin on pause.  Thrombocytopenia likely 2/2 to consumptive process, hematology following.  Holding heparin while platelets < 35.    - cbc, coags bid and prn  - fibrinogen daily  - Holding heaprin infusion if platelets <35  - Hematology following awaiting recommendations, suggest transfuse to goal of 35 in order to restart anticoagulation   - obtain cbc 1h post platelet transfusion to see if pt increments  - ongoing monitoring for s/s bleeding  - close surveillance of RLE and drains  - continue Decadron 13mg BID - heme  suspicion of HLH (will need slow wean when ready)  - Vasc Med following, appreciate recs  - maintain active T&S (3/28)    ENDO: History of DM2. A1c 7.5%. TSH WNL 1/2024. Insulin infusion   - q4h BG checks     ID: Afebrile. Leukocytosis. On broad antimicrobial therapy. MRSA screen + 2/28/24. Pan cultures sent 3/22. Sputum NTF, +MRSA screen (completed 5 day course mupirocin), UCx and BCx negative. Completed 7 day course vanc/zosyn 3/27.   - temp q4h, wbc daily  - ongoing  monitoring for s/s infection    Lines:  - right brachial arterial line (3/20)  - RIJ trialysis (3/27)  - left subclavian MAC with mini MAC (3/20)  - PIV x1     Dispo: Continue ICU care. Patient seen and discussed with ICU attending Dr. Inga Mcneil, DO  SICU phone [phone]

## 2024-03-31 NOTE — PROGRESS NOTES
"Name: Jason Hollins  MRN: [id number]  Encounter Date: 3/31/2024  PCP: Mary Arenas, APRN-CNP  Heme-Onc: Dr. Carbajal    Reason for consult: ?HLH now with thrombocytopenia  Attending Provider Dr. Smith    Hematology/ Oncology Consult Daily Progress Note    Overnight Events   Pt remains intubated and sedated,     Review of Systems   12 Point ROS negative except HPI     Allergies   No Known Allergies    Medications     dexAMETHasone, 13.2 mg, q12h  esomeprazole, 40 mg, Daily before breakfast  fludrocortisone, 0.1 mg, q12h  midodrine, 15 mg, q8h  thiamine, 100 mg, Daily      dexmedeTOMIDine, Last Rate: Stopped (03/31/24 0745)  [Held by provider] heparin, Last Rate: Stopped (03/30/24 1545)  insulin regular infusion for cardiac surgery, Last Rate: 2.3 Units/hr (03/31/24 1300)  lactated Ringer's, Last Rate: 5 mL/hr (03/31/24 1300)  norepinephrine, Last Rate: Stopped (03/31/24 0815)  PrismaSol BGK 2/3.5, Last Rate: 2,400 mL/hr (03/31/24 1019)  vasopressin      alteplase, 2 mg, PRN  calcium gluconate, 1 g, q6h PRN  calcium gluconate, 2 g, q6h PRN  dextrose, 25 g, q15 min PRN   Or  glucagon, 1 mg, q15 min PRN  magnesium sulfate, 2 g, q6h PRN  magnesium sulfate, 4 g, q6h PRN  oxygen, , Continuous PRN - O2/gases        Physical Exam   Blood pressure 133/53, pulse 71, temperature 36 °C (96.8 °F), temperature source Temporal, resp. rate (!) 27, height 1.778 m (5' 10\"), weight 125 kg (275 lb 5.7 oz), SpO2 94 %.    GEN: NAD, intubated but awake, nods yes/no  HEENT: NC/AT, MMM  CV: RRR no m/r/g   Chest: CTAB + intubated w/ mechanical breath sounds +blood in ETT and oral mucosa  GI: soft, NTND  MSK: no edema  Skin: no obvious rashes, +scattered ecchymoses   Neuro: intubated but nods yes/no to questions, moves all limbs, face symmetric      Labs     Lab Results   Component Value Date    GLUCOSE 176 (H) 03/31/2024    CALCIUM 8.5 (L) 03/31/2024     03/31/2024    K 5.0 03/31/2024    CO2 25 03/31/2024     03/31/2024    BUN 59 " (H) 03/31/2024    CREATININE 1.93 (H) 03/31/2024       Lab Results   Component Value Date    WBC 15.1 (H) 03/31/2024    HGB 7.5 (L) 03/31/2024    HCT 22.9 (L) 03/31/2024    MCV 88 03/31/2024    PLT 35 (LL) 03/31/2024       Lab Results   Component Value Date     (H) 03/29/2024     (H) 03/29/2024    GGT 21 02/13/2024    ALKPHOS 116 03/29/2024    BILITOT 16.2 (H) 03/29/2024         Assessment/Plan     Jason Hollins is a 63 y.o. 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 Post operative course was uncomplicated and patient was on RNF until 3/20 for prolonged bed rest to avoid hip flexion to maintain flap integrity. However on 3/20, patient developed a PE arrest and was coded for an hour receiving TPA with ROSC. Pt was thought to have HLH partially meeting criteria so started on dex 10mg/m2 3/28 without significant improvement. Now with dropping plts, smear reviewed again on 3/31 showing toxic granules and left shifted neutrophils suggesting ongoing/recent infection or significant stressor. Plts decreased in number but responding to transfusion. Not on abx or recent medications that could cause drug induced thrombocytopenia but is on constant CVVH which can cause plt destruction, also with bleeding from oral mucosa which can cause physiologic consumption. Would recommend to continue to transfuse to plt threshold of 35 if possible in order to allow anticoagulation, can start IVIG for possible immune etiology as pt has impending OR needs.    Peripheral Smear Reviewed 3/31/24  WBCs: toxic appearing PMNs and few bands suggesting current or recent infection or significant stressor  RBCs: occasional Rasta cells c/w renal impairment/failure, occasional target cell c/w known liver abnormalities, polychromasia, no  RBC frags   Plt: reduced, no clumping     #thrombocytopenia  -likely toxic/infectious related  -transfuse plts as needed to threshold of 35 with ongoing need for AC  -stop dex and start IVIG 400mg/kg - give today and will assess need daily  -daily CBC w/ diff    Thank you for this consult, we will continue to follow. Pt seen and discussed with Dr. Blackwell.      John Gruber DO  Hematology- Oncology Fellow, PGY6  Hematology Consult Pager: [phone]

## 2024-03-31 NOTE — PROCEDURES
Nephrology entry     Saw and evaluated patient on CVVH    Remains anuric, on NE 0.05  Net negative 1.8L over past 24h       Plan: Continue CVVH- will switch to 2k bath today           RFP bid while on CVVH            Replete phos if low            Strict I and O charting, daily bladder scans     DW Dr Jonathon Baldwin MD   Nephrology fellow   Nephrology pager [phone]  Available via Epic Chat

## 2024-03-31 NOTE — PROGRESS NOTES
Department of Plastic and Reconstructive Surgery  Daily Progress Note    Patient Name: Jason Hollins  MRN: [id number]  Date:  03/31/24     Subjective  Patient remains critically ill in CTICU, intubated with CRRT. Pt able to follow commands weakly, responds to verbal stimuli and tracks across room with eyes. Low dose norepi currently running 2/2 fluid removal from CRRT. Hgb remains stable. On precautions for c.diff rule out.     Objective  Physical Exam   Constitutional: Intubated, sedation weaned off. Opens eyes to verbal stimuli and tracks around room.   ENMT: MMM, ETT in place, OG tube in place  Cardiovascular: RRR on telemetry monitor  Respiratory/Thorax: ETT in place   Gastrointestinal: abdomen soft, non distended  Genitourinary: CVVHD until weaned from pressors   Musculoskeletal: Minimal purposeful movement appreciated  Extremities: Generalized pitting edema, Right 4Fr CFA and 7Fr CFV sheath in place  RLE: right thigh edematous, large, no bruising noted, soft and compressible, incisional wound vac in place, holding suction at -75 mmHG, no leak or alarm present, ANDERSON drain x 3 with dark serous output  BLE neurovascular intact, cap refill < 2 sec, +df/pf, DP/PT/ radial pulses 2+, no drainage noted.   Neurological:  HANNAH. intubated   Psychological: HANNAH, intubated  Skin: Warm, dry, intact    Current Medications  Scheduled medications  dexAMETHasone, 13.2 mg, intravenous, q12h  esomeprazole, 40 mg, nasogastric tube, Daily before breakfast  midodrine, 15 mg, orogastric tube, q8h  thiamine, 100 mg, intravenous, Daily      Continuous medications  dexmedeTOMIDine, 0.1-1.5 mcg/kg/hr, Last Rate: Stopped (03/31/24 0745)  [Held by provider] heparin, 0-4,000 Units/hr, Last Rate: Stopped (03/30/24 2885)  insulin regular infusion for cardiac surgery, 0-15 Units/hr, Last Rate: 2.3 Units/hr (03/31/24 0900)  lactated Ringer's, 5 mL/hr, Last Rate: 5 mL/hr (03/31/24 0900)  norepinephrine, 0.01-0.5 mcg/kg/min (Dosing Weight), Last Rate:  Stopped (03/31/24 0815)  PrismaSol BGK 2/3.5, 2,400 mL/hr, Last Rate: 2,400 mL/hr (03/31/24 1019)  vasopressin, 0.01-0.03 Units/min      PRN medications  PRN medications: alteplase, calcium gluconate, calcium gluconate, dextrose **OR** glucagon, magnesium sulfate, magnesium sulfate, oxygen     Assessment  Jason Hollins 63 y.o. 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 Hawkins and Dr. Smith on 3/5/24.  Post operative course was uncomplicated and patient was on RNF until 3/20 for prolonged bed rest to avoid hip flexion to maintain flap integrity.  Patient was on prophylactic lovenox while on bedrest.    3/20: Now s/p CPR with ROSC after TPA for assumed large PE, overnight started on heparin, epo, increased pressor requirements, increased swelling at flap site.   3/21: MTP transfusions. Return to OR for exploration of R Flap site now s/p Exploration of right thigh wound evacuation of hematoma. Suture ligation of multiple bleeding vessel and several points in gluteal area with Dr. Smith.  3/26: 3 drains in place with continued output but output is slowly downtrending, wound vac remains with no output    Plan/Recommendations  S/p Exploration of right thigh wound evacuation of hematoma 3/21. Suture ligation of multiple bleeding vessel and several points in gluteal area:  A/P:   - Remains critically ill in CTICU       ·  remains intubated, without sedation       ·  Levophed resumed PM 3/27       ·  CRRT       ·  Heparin gtt resumed 3/27 after HIIT testing negative  - Maintain incisional wound vac to right gluteal/thigh area x10-14 days post-op (4/4)       ·  Settings: -75mmHg low continuous suction        ·  Notify plastic surgery with any concerns of leak or obstruction   - Monitor right thigh for worsening s/s of active  bleeding  - Continue ANDERSON drain care per nursing (x3)     ·  Strip drain tubing TID and PRN     ·  Monitor and record output q8h      ·  Keep drain sites C/D/I with daily drain dressing changes      ·  Notify plastics if ANDERSON drain output exceeds > 100 ml/hr in one drain or > 300 ml/hr collectively     ·  No acute signs of bleeding   - Appreciate remaining care per CTICU/SICU  - Plastics to follow    Patient and plan discussed with Dr. Stevens, PA-C  Plastic and Reconstructive Surgery  Epic chat, Pager [phone], Team phones: [phone]

## 2024-04-01 ENCOUNTER — APPOINTMENT (OUTPATIENT)
Dept: RADIOLOGY | Facility: HOSPITAL | Age: 64
DRG: 003 | End: 2024-04-01
Payer: COMMERCIAL

## 2024-04-01 ENCOUNTER — ANESTHESIA (OUTPATIENT)
Dept: OPERATING ROOM | Facility: HOSPITAL | Age: 64
DRG: 003 | End: 2024-04-01
Payer: COMMERCIAL

## 2024-04-01 ENCOUNTER — ANESTHESIA EVENT (OUTPATIENT)
Dept: OPERATING ROOM | Facility: HOSPITAL | Age: 64
DRG: 003 | End: 2024-04-01
Payer: COMMERCIAL

## 2024-04-01 PROBLEM — D64.9 ANEMIA: Status: ACTIVE | Noted: 2024-04-01

## 2024-04-01 PROBLEM — D69.6 THROMBOCYTOPENIA (CMS-HCC): Status: ACTIVE | Noted: 2024-04-01

## 2024-04-01 PROBLEM — N17.9 ACUTE KIDNEY INJURY (NONTRAUMATIC) (CMS-HCC): Status: ACTIVE | Noted: 2024-04-01

## 2024-04-01 PROBLEM — I26.99 PULMONARY EMBOLUS (MULTI): Status: ACTIVE | Noted: 2024-04-01

## 2024-04-01 LAB
ALBUMIN SERPL BCP-MCNC: 3.2 G/DL (ref 3.4–5)
ALBUMIN SERPL BCP-MCNC: 3.5 G/DL (ref 3.4–5)
ALBUMIN SERPL BCP-MCNC: 3.5 G/DL (ref 3.4–5)
ALP SERPL-CCNC: 139 U/L (ref 33–136)
ALT SERPL W P-5'-P-CCNC: 177 U/L (ref 10–52)
ANION GAP BLDA CALCULATED.4IONS-SCNC: 11 MMO/L (ref 10–25)
ANION GAP BLDA CALCULATED.4IONS-SCNC: 13 MMO/L (ref 10–25)
ANION GAP SERPL CALC-SCNC: 15 MMOL/L (ref 10–20)
ANION GAP SERPL CALC-SCNC: 18 MMOL/L (ref 10–20)
APTT PPP: 29 SECONDS (ref 27–38)
AST SERPL W P-5'-P-CCNC: 120 U/L (ref 9–39)
BASE EXCESS BLDA CALC-SCNC: -0.5 MMOL/L (ref -2–3)
BASE EXCESS BLDA CALC-SCNC: -2.6 MMOL/L (ref -2–3)
BILIRUB DIRECT SERPL-MCNC: 14.1 MG/DL (ref 0–0.3)
BILIRUB SERPL-MCNC: 19 MG/DL (ref 0–1.2)
BODY TEMPERATURE: 37 DEGREES CELSIUS
BODY TEMPERATURE: 37 DEGREES CELSIUS
BUN SERPL-MCNC: 60 MG/DL (ref 6–23)
BUN SERPL-MCNC: 61 MG/DL (ref 6–23)
CA-I BLD-SCNC: 1.18 MMOL/L (ref 1.1–1.33)
CA-I BLD-SCNC: 1.19 MMOL/L (ref 1.1–1.33)
CA-I BLDA-SCNC: 1.2 MMOL/L (ref 1.1–1.33)
CA-I BLDA-SCNC: 1.23 MMOL/L (ref 1.1–1.33)
CALCIUM SERPL-MCNC: 8.5 MG/DL (ref 8.6–10.6)
CALCIUM SERPL-MCNC: 8.8 MG/DL (ref 8.6–10.6)
CHLORIDE BLDA-SCNC: 101 MMOL/L (ref 98–107)
CHLORIDE BLDA-SCNC: 102 MMOL/L (ref 98–107)
CHLORIDE SERPL-SCNC: 100 MMOL/L (ref 98–107)
CHLORIDE SERPL-SCNC: 99 MMOL/L (ref 98–107)
CO2 SERPL-SCNC: 22 MMOL/L (ref 21–32)
CO2 SERPL-SCNC: 25 MMOL/L (ref 21–32)
CREAT SERPL-MCNC: 1.81 MG/DL (ref 0.5–1.3)
CREAT SERPL-MCNC: 1.85 MG/DL (ref 0.5–1.3)
EGFRCR SERPLBLD CKD-EPI 2021: 41 ML/MIN/1.73M*2
EGFRCR SERPLBLD CKD-EPI 2021: 42 ML/MIN/1.73M*2
ERYTHROCYTE [DISTWIDTH] IN BLOOD BY AUTOMATED COUNT: 20 % (ref 11.5–14.5)
ERYTHROCYTE [DISTWIDTH] IN BLOOD BY AUTOMATED COUNT: 21.5 % (ref 11.5–14.5)
ERYTHROCYTE [DISTWIDTH] IN BLOOD BY AUTOMATED COUNT: 21.6 % (ref 11.5–14.5)
FIBRINOGEN PPP-MCNC: 163 MG/DL (ref 200–400)
FIBRINOGEN PPP-MCNC: 176 MG/DL (ref 200–400)
GLUCOSE BLD MANUAL STRIP-MCNC: 121 MG/DL (ref 74–99)
GLUCOSE BLD MANUAL STRIP-MCNC: 125 MG/DL (ref 74–99)
GLUCOSE BLD MANUAL STRIP-MCNC: 130 MG/DL (ref 74–99)
GLUCOSE BLD MANUAL STRIP-MCNC: 159 MG/DL (ref 74–99)
GLUCOSE BLD MANUAL STRIP-MCNC: 160 MG/DL (ref 74–99)
GLUCOSE BLD MANUAL STRIP-MCNC: 163 MG/DL (ref 74–99)
GLUCOSE BLD MANUAL STRIP-MCNC: 164 MG/DL (ref 74–99)
GLUCOSE BLD MANUAL STRIP-MCNC: 172 MG/DL (ref 74–99)
GLUCOSE BLD MANUAL STRIP-MCNC: 204 MG/DL (ref 74–99)
GLUCOSE BLD MANUAL STRIP-MCNC: 205 MG/DL (ref 74–99)
GLUCOSE BLDA-MCNC: 167 MG/DL (ref 74–99)
GLUCOSE BLDA-MCNC: 186 MG/DL (ref 74–99)
GLUCOSE SERPL-MCNC: 170 MG/DL (ref 74–99)
GLUCOSE SERPL-MCNC: 181 MG/DL (ref 74–99)
HCO3 BLDA-SCNC: 22.5 MMOL/L (ref 22–26)
HCO3 BLDA-SCNC: 24.2 MMOL/L (ref 22–26)
HCT VFR BLD AUTO: 22.1 % (ref 41–52)
HCT VFR BLD AUTO: 23.1 % (ref 41–52)
HCT VFR BLD AUTO: 23.7 % (ref 41–52)
HCT VFR BLD EST: 24 % (ref 41–52)
HCT VFR BLD EST: 25 % (ref 41–52)
HGB BLD-MCNC: 7.7 G/DL (ref 13.5–17.5)
HGB BLD-MCNC: 7.8 G/DL (ref 13.5–17.5)
HGB BLD-MCNC: 7.9 G/DL (ref 13.5–17.5)
HGB BLDA-MCNC: 7.9 G/DL (ref 13.5–17.5)
HGB BLDA-MCNC: 8.3 G/DL (ref 13.5–17.5)
INHALED O2 CONCENTRATION: 50 %
INHALED O2 CONCENTRATION: 50 %
INR PPP: 1.7 (ref 0.9–1.1)
LACTATE BLDA-SCNC: 1.7 MMOL/L (ref 0.4–2)
LACTATE BLDA-SCNC: 2.4 MMOL/L (ref 0.4–2)
MAGNESIUM SERPL-MCNC: 2.35 MG/DL (ref 1.6–2.4)
MAGNESIUM SERPL-MCNC: 2.58 MG/DL (ref 1.6–2.4)
MCH RBC QN AUTO: 28.6 PG (ref 26–34)
MCH RBC QN AUTO: 29.1 PG (ref 26–34)
MCH RBC QN AUTO: 30.4 PG (ref 26–34)
MCHC RBC AUTO-ENTMCNC: 32.9 G/DL (ref 32–36)
MCHC RBC AUTO-ENTMCNC: 33.3 G/DL (ref 32–36)
MCHC RBC AUTO-ENTMCNC: 35.7 G/DL (ref 32–36)
MCV RBC AUTO: 85 FL (ref 80–100)
MCV RBC AUTO: 87 FL (ref 80–100)
MCV RBC AUTO: 87 FL (ref 80–100)
NRBC BLD-RTO: 0.7 /100 WBCS (ref 0–0)
NRBC BLD-RTO: 0.9 /100 WBCS (ref 0–0)
NRBC BLD-RTO: 1.2 /100 WBCS (ref 0–0)
OXYHGB MFR BLDA: 94.1 % (ref 94–98)
OXYHGB MFR BLDA: 96.1 % (ref 94–98)
PCO2 BLDA: 39 MM HG (ref 38–42)
PCO2 BLDA: 39 MM HG (ref 38–42)
PH BLDA: 7.37 PH (ref 7.38–7.42)
PH BLDA: 7.4 PH (ref 7.38–7.42)
PHOSPHATE SERPL-MCNC: 3 MG/DL (ref 2.5–4.9)
PHOSPHATE SERPL-MCNC: 3.2 MG/DL (ref 2.5–4.9)
PLATELET # BLD AUTO: 44 X10*3/UL (ref 150–450)
PLATELET # BLD AUTO: 50 X10*3/UL (ref 150–450)
PLATELET # BLD AUTO: 58 X10*3/UL (ref 150–450)
PO2 BLDA: 76 MM HG (ref 85–95)
PO2 BLDA: 94 MM HG (ref 85–95)
POTASSIUM BLDA-SCNC: 4.5 MMOL/L (ref 3.5–5.3)
POTASSIUM BLDA-SCNC: 5.1 MMOL/L (ref 3.5–5.3)
POTASSIUM SERPL-SCNC: 4.4 MMOL/L (ref 3.5–5.3)
POTASSIUM SERPL-SCNC: 4.7 MMOL/L (ref 3.5–5.3)
PROT SERPL-MCNC: 5.8 G/DL (ref 6.4–8.2)
PROTHROMBIN TIME: 19 SECONDS (ref 9.8–12.8)
RBC # BLD AUTO: 2.6 X10*6/UL (ref 4.5–5.9)
RBC # BLD AUTO: 2.65 X10*6/UL (ref 4.5–5.9)
RBC # BLD AUTO: 2.73 X10*6/UL (ref 4.5–5.9)
SAO2 % BLDA: 97 % (ref 94–100)
SAO2 % BLDA: 98 % (ref 94–100)
SODIUM BLDA-SCNC: 131 MMOL/L (ref 136–145)
SODIUM BLDA-SCNC: 133 MMOL/L (ref 136–145)
SODIUM SERPL-SCNC: 134 MMOL/L (ref 136–145)
SODIUM SERPL-SCNC: 136 MMOL/L (ref 136–145)
WBC # BLD AUTO: 10.4 X10*3/UL (ref 4.4–11.3)
WBC # BLD AUTO: 11 X10*3/UL (ref 4.4–11.3)
WBC # BLD AUTO: 13 X10*3/UL (ref 4.4–11.3)

## 2024-04-01 PROCEDURE — 85384 FIBRINOGEN ACTIVITY: CPT | Performed by: NURSE PRACTITIONER

## 2024-04-01 PROCEDURE — 2720000007 HC OR 272 NO HCPCS: Performed by: OTOLARYNGOLOGY

## 2024-04-01 PROCEDURE — 84132 ASSAY OF SERUM POTASSIUM: CPT | Performed by: NURSE PRACTITIONER

## 2024-04-01 PROCEDURE — 31600 PLANNED TRACHEOSTOMY: CPT | Performed by: OTOLARYNGOLOGY

## 2024-04-01 PROCEDURE — 82248 BILIRUBIN DIRECT: CPT

## 2024-04-01 PROCEDURE — 90937 HEMODIALYSIS REPEATED EVAL: CPT

## 2024-04-01 PROCEDURE — 2500000005 HC RX 250 GENERAL PHARMACY W/O HCPCS: Performed by: STUDENT IN AN ORGANIZED HEALTH CARE EDUCATION/TRAINING PROGRAM

## 2024-04-01 PROCEDURE — P9040 RBC LEUKOREDUCED IRRADIATED: HCPCS

## 2024-04-01 PROCEDURE — 71045 X-RAY EXAM CHEST 1 VIEW: CPT

## 2024-04-01 PROCEDURE — 82947 ASSAY GLUCOSE BLOOD QUANT: CPT

## 2024-04-01 PROCEDURE — 31630 BRONCHOSCOPY DILATE/FX REPR: CPT | Performed by: OTOLARYNGOLOGY

## 2024-04-01 PROCEDURE — 2500000004 HC RX 250 GENERAL PHARMACY W/ HCPCS (ALT 636 FOR OP/ED): Mod: JZ | Performed by: STUDENT IN AN ORGANIZED HEALTH CARE EDUCATION/TRAINING PROGRAM

## 2024-04-01 PROCEDURE — A4217 STERILE WATER/SALINE, 500 ML: HCPCS | Performed by: OTOLARYNGOLOGY

## 2024-04-01 PROCEDURE — A31630 PR BRONCHOSCOPY,TRACH/BRONCH DILATN: Performed by: STUDENT IN AN ORGANIZED HEALTH CARE EDUCATION/TRAINING PROGRAM

## 2024-04-01 PROCEDURE — 76705 ECHO EXAM OF ABDOMEN: CPT

## 2024-04-01 PROCEDURE — 2020000001 HC ICU ROOM DAILY

## 2024-04-01 PROCEDURE — 3600000003 HC OR TIME - INITIAL BASE CHARGE - PROCEDURE LEVEL THREE: Performed by: OTOLARYNGOLOGY

## 2024-04-01 PROCEDURE — 99291 CRITICAL CARE FIRST HOUR: CPT | Performed by: ANESTHESIOLOGY

## 2024-04-01 PROCEDURE — 83735 ASSAY OF MAGNESIUM: CPT | Performed by: NURSE PRACTITIONER

## 2024-04-01 PROCEDURE — 85027 COMPLETE CBC AUTOMATED: CPT | Performed by: NURSE PRACTITIONER

## 2024-04-01 PROCEDURE — 74018 RADEX ABDOMEN 1 VIEW: CPT | Performed by: RADIOLOGY

## 2024-04-01 PROCEDURE — 2500000004 HC RX 250 GENERAL PHARMACY W/ HCPCS (ALT 636 FOR OP/ED)

## 2024-04-01 PROCEDURE — 3600000008 HC OR TIME - EACH INCREMENTAL 1 MINUTE - PROCEDURE LEVEL THREE: Performed by: OTOLARYNGOLOGY

## 2024-04-01 PROCEDURE — 2500000004 HC RX 250 GENERAL PHARMACY W/ HCPCS (ALT 636 FOR OP/ED): Performed by: STUDENT IN AN ORGANIZED HEALTH CARE EDUCATION/TRAINING PROGRAM

## 2024-04-01 PROCEDURE — 94003 VENT MGMT INPAT SUBQ DAY: CPT

## 2024-04-01 PROCEDURE — 71045 X-RAY EXAM CHEST 1 VIEW: CPT | Performed by: RADIOLOGY

## 2024-04-01 PROCEDURE — 99232 SBSQ HOSP IP/OBS MODERATE 35: CPT | Performed by: PHYSICIAN ASSISTANT

## 2024-04-01 PROCEDURE — 36430 TRANSFUSION BLD/BLD COMPNT: CPT

## 2024-04-01 PROCEDURE — 74018 RADEX ABDOMEN 1 VIEW: CPT

## 2024-04-01 PROCEDURE — 82330 ASSAY OF CALCIUM: CPT

## 2024-04-01 PROCEDURE — 2500000004 HC RX 250 GENERAL PHARMACY W/ HCPCS (ALT 636 FOR OP/ED): Performed by: ANESTHESIOLOGIST ASSISTANT

## 2024-04-01 PROCEDURE — A7521 TRACH/LARYN TUBE CUFFED: HCPCS | Performed by: OTOLARYNGOLOGY

## 2024-04-01 PROCEDURE — P9045 ALBUMIN (HUMAN), 5%, 250 ML: HCPCS | Mod: JZ | Performed by: STUDENT IN AN ORGANIZED HEALTH CARE EDUCATION/TRAINING PROGRAM

## 2024-04-01 PROCEDURE — 85610 PROTHROMBIN TIME: CPT | Performed by: NURSE PRACTITIONER

## 2024-04-01 PROCEDURE — 2500000001 HC RX 250 WO HCPCS SELF ADMINISTERED DRUGS (ALT 637 FOR MEDICARE OP): Performed by: STUDENT IN AN ORGANIZED HEALTH CARE EDUCATION/TRAINING PROGRAM

## 2024-04-01 PROCEDURE — 99291 CRITICAL CARE FIRST HOUR: CPT

## 2024-04-01 PROCEDURE — 3700000001 HC GENERAL ANESTHESIA TIME - INITIAL BASE CHARGE: Performed by: OTOLARYNGOLOGY

## 2024-04-01 PROCEDURE — 2500000001 HC RX 250 WO HCPCS SELF ADMINISTERED DRUGS (ALT 637 FOR MEDICARE OP)

## 2024-04-01 PROCEDURE — A31630 PR BRONCHOSCOPY,TRACH/BRONCH DILATN: Performed by: ANESTHESIOLOGIST ASSISTANT

## 2024-04-01 PROCEDURE — 2500000004 HC RX 250 GENERAL PHARMACY W/ HCPCS (ALT 636 FOR OP/ED): Performed by: OTOLARYNGOLOGY

## 2024-04-01 PROCEDURE — 2500000005 HC RX 250 GENERAL PHARMACY W/O HCPCS: Performed by: ANESTHESIOLOGIST ASSISTANT

## 2024-04-01 PROCEDURE — 3700000002 HC GENERAL ANESTHESIA TIME - EACH INCREMENTAL 1 MINUTE: Performed by: OTOLARYNGOLOGY

## 2024-04-01 PROCEDURE — 76705 ECHO EXAM OF ABDOMEN: CPT | Performed by: RADIOLOGY

## 2024-04-01 PROCEDURE — 0B110F4 BYPASS TRACHEA TO CUTANEOUS WITH TRACHEOSTOMY DEVICE, OPEN APPROACH: ICD-10-PCS | Performed by: OTOLARYNGOLOGY

## 2024-04-01 PROCEDURE — 37799 UNLISTED PX VASCULAR SURGERY: CPT

## 2024-04-01 PROCEDURE — 2500000001 HC RX 250 WO HCPCS SELF ADMINISTERED DRUGS (ALT 637 FOR MEDICARE OP): Performed by: OTOLARYNGOLOGY

## 2024-04-01 PROCEDURE — 3E0G76Z INTRODUCTION OF NUTRITIONAL SUBSTANCE INTO UPPER GI, VIA NATURAL OR ARTIFICIAL OPENING: ICD-10-PCS

## 2024-04-01 PROCEDURE — 85027 COMPLETE CBC AUTOMATED: CPT

## 2024-04-01 RX ORDER — PHENYLEPHRINE HCL IN 0.9% NACL 1 MG/10 ML
SYRINGE (ML) INTRAVENOUS AS NEEDED
Status: DISCONTINUED | OUTPATIENT
Start: 2024-04-01 | End: 2024-04-01

## 2024-04-01 RX ORDER — MIDAZOLAM HYDROCHLORIDE 1 MG/ML
INJECTION INTRAMUSCULAR; INTRAVENOUS CONTINUOUS PRN
Status: DISCONTINUED | OUTPATIENT
Start: 2024-04-01 | End: 2024-04-01

## 2024-04-01 RX ORDER — ALBUMIN HUMAN 50 G/1000ML
12.5 SOLUTION INTRAVENOUS ONCE
Status: COMPLETED | OUTPATIENT
Start: 2024-04-01 | End: 2024-04-01

## 2024-04-01 RX ORDER — SODIUM CHLORIDE 0.9 G/100ML
IRRIGANT IRRIGATION AS NEEDED
Status: DISCONTINUED | OUTPATIENT
Start: 2024-04-01 | End: 2024-04-01 | Stop reason: HOSPADM

## 2024-04-01 RX ORDER — ROCURONIUM BROMIDE 10 MG/ML
INJECTION, SOLUTION INTRAVENOUS AS NEEDED
Status: DISCONTINUED | OUTPATIENT
Start: 2024-04-01 | End: 2024-04-01

## 2024-04-01 RX ADMIN — Medication 120 MCG: at 17:24

## 2024-04-01 RX ADMIN — FLUDROCORTISONE ACETATE 0.1 MG: 0.1 TABLET ORAL at 12:55

## 2024-04-01 RX ADMIN — Medication 50 PERCENT: at 11:14

## 2024-04-01 RX ADMIN — THIAMINE HYDROCHLORIDE 100 MG: 100 INJECTION, SOLUTION INTRAMUSCULAR; INTRAVENOUS at 09:12

## 2024-04-01 RX ADMIN — SODIUM CHLORIDE, SODIUM LACTATE, POTASSIUM CHLORIDE, AND CALCIUM CHLORIDE: 600; 310; 30; 20 INJECTION, SOLUTION INTRAVENOUS at 17:13

## 2024-04-01 RX ADMIN — ALBUMIN HUMAN 12.5 G: 0.05 INJECTION, SOLUTION INTRAVENOUS at 22:08

## 2024-04-01 RX ADMIN — Medication 200 MCG: at 17:34

## 2024-04-01 RX ADMIN — SUGAMMADEX 200 MG: 100 INJECTION, SOLUTION INTRAVENOUS at 17:53

## 2024-04-01 RX ADMIN — ESOMEPRAZOLE MAGNESIUM 40 MG: 40 FOR SUSPENSION ORAL at 09:13

## 2024-04-01 RX ADMIN — HEPARIN SODIUM 1200 UNITS/HR: 10000 INJECTION, SOLUTION INTRAVENOUS at 20:44

## 2024-04-01 RX ADMIN — MIDODRINE HYDROCHLORIDE 20 MG: 10 TABLET ORAL at 09:12

## 2024-04-01 RX ADMIN — CALCIUM CHLORIDE, MAGNESIUM CHLORIDE, DEXTROSE MONOHYDRATE, LACTIC ACID, SODIUM CHLORIDE, SODIUM BICARBONATE AND POTASSIUM CHLORIDE 2400 ML/HR: 5.15; 2.03; 22; 5.4; 6.46; 3.09; .157 INJECTION INTRAVENOUS at 00:28

## 2024-04-01 RX ADMIN — ROCURONIUM 50 MG: 50 INJECTION, SOLUTION INTRAVENOUS at 17:18

## 2024-04-01 RX ADMIN — FLUDROCORTISONE ACETATE 0.1 MG: 0.1 TABLET ORAL at 02:48

## 2024-04-01 RX ADMIN — INSULIN HUMAN 1.25 UNITS/HR: 1 INJECTION, SOLUTION INTRAVENOUS at 07:48

## 2024-04-01 RX ADMIN — HYDROCORTISONE SODIUM SUCCINATE 50 MG: 100 INJECTION, POWDER, FOR SOLUTION INTRAMUSCULAR; INTRAVENOUS at 03:49

## 2024-04-01 RX ADMIN — Medication 200 MCG: at 17:30

## 2024-04-01 ASSESSMENT — PAIN - FUNCTIONAL ASSESSMENT
PAIN_FUNCTIONAL_ASSESSMENT: CPOT (CRITICAL CARE PAIN OBSERVATION TOOL)

## 2024-04-01 ASSESSMENT — PAIN SCALES - GENERAL
PAINLEVEL_OUTOF10: 0 - NO PAIN

## 2024-04-01 NOTE — PROGRESS NOTES
Department of Plastic and Reconstructive Surgery  Daily Progress Note    Patient Name: Jason Hollins  MRN: [id number]  Date:  04/01/24     Subjective   Patient remains critically ill in CTICU, intubated with CRRT. Pt able to follow commands weakly, responds to verbal stimuli and tracks across room with eyes. Anticipated for OR today for tracheostomy placement.     Objective   Vitals:    04/01/24 1420   BP:    Pulse: 86   Resp: (!) 35   Temp:    SpO2: 96%     Physical Exam   Constitutional: Intubated, sedation weaned off. Opens eyes to verbal stimuli and tracks around room.   ENMT: MMM, ETT in place, OG tube in place.  Cardiovascular: RRR on telemetry monitor.  Respiratory/Thorax: ETT in place.  Gastrointestinal: Abdomen soft, protuberant.   Genitourinary: CVVHD until weaned from pressors.   Musculoskeletal: Minimal purposeful movement appreciated.   Extremities: Generalized pitting edema, slightly improved from prior. Right thigh edematous, but soft and compressible, no bruising or tissue induration noted. Incisional wound vac in place to flap incisions, holding suction at -75 mmHG, no leak or alarm present. ANDERSON drain x 3 with dark serous output.  Neurological:  HANNAH, intubated. Opens eyes to verbal stimuli and tracks around room.    Psychological: HANNAH, intubated.  Skin: Warm, dry.     Current Medications  Scheduled medications  esomeprazole, 40 mg, nasogastric tube, Daily before breakfast  fludrocortisone, 0.1 mg, nasogastric tube, q12h  hydrocortisone sodium succinate, 50 mg, intravenous, q12h  midodrine, 20 mg, orogastric tube, q8h  thiamine, 100 mg, intravenous, Daily    Continuous medications  [Held by provider] heparin, 0-4,000 Units/hr, Last Rate: Stopped (03/30/24 1545)  insulin regular infusion for cardiac surgery, 0-15 Units/hr, Last Rate: 0.6 Units/hr (04/01/24 1430)  lactated Ringer's, 5 mL/hr, Last Rate: 5 mL/hr (04/01/24 1400)  PrismaSol BGK 2/3.5, 2,400 mL/hr, Last Rate: 2,400 mL/hr (04/01/24 0028)    PRN  medications  PRN medications: alteplase, calcium gluconate, calcium gluconate, dextrose **OR** glucagon, magnesium sulfate, magnesium sulfate, oxygen     Assessment   Jason Hollins 63 y.o. 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 Hawkins and Dr. Smith on 3/5/24. Post operative course c/b arrest requiring CPR with ROSC after TPA for assumed large PE on 3/20. Increased swelling at flap site appreciated overnight 3/20-3/21 and pt returned to OR for exploration of R flap site, evacuation of hematoma, suture ligation of multiple bleeding vessel sites in gluteal area and wound closure with Dr. Smith on 3/21. Pt has remained in CTICU for continued critical care evaluation and management postoperatively.     Plan/Recommendations  - Appreciate ongoing ICU evaluation and management following acute clinical decompensation event 3/20       ·  Remains intubated, off sedation, responsive to verbal commands today        ·  Anticipated for return to OR today for tracheostomy placement   - Maintain incisional wound vac to right gluteal/thigh area x10-14 days post-op (end date 4/4)       ·  Settings: -75mmHg low continuous suction        ·  Notify plastic surgery with any concerns of leak or obstruction   - Monitor right thigh for s/s of bleeding recurrence, has been stable on serial assessments of past week   - Continue ANDERSON drain care to x3 drains present at R thigh flap reconstruction site      ·  Strip drain tubing TID and PRN     ·  Monitor and record output q8h      ·  Keep drain sites C/D/I with daily drain dressing changes      ·  Notify plastics if ANDERSON drain output exceeds > 100 ml/hr in one drain or > 300 ml/hr collectively  - Plastic Surgery will continue to follow     Patient and plan discussed with Dr. Smith.     Gracie  DANAE Smith  Plastic and Reconstructive Surgery   Pager #[phone]  Team phones: [phone]

## 2024-04-01 NOTE — ANESTHESIA POSTPROCEDURE EVALUATION
Patient: Jason Hollins    Procedure Summary       Date: 04/01/24 Room / Location: [facility] OR 05 / Virtual [facility] OR    Anesthesia Start: 1713 Anesthesia Stop: 1814    Procedures:       Creation Tracheostomy (Bilateral: Neck)      Bronchoscopy Flexible Diagnosis:       Acute respiratory failure with hypoxia (CMS/HCC)      (Acute respiratory failure with hypoxia (CMS/HCC) [J96.01])    Surgeons: Osman Perkins MD Responsible Provider: Jemma Bragg MD MPH    Anesthesia Type: general ASA Status: 4            Anesthesia Type: general    Vitals Value Taken Time   /73 04/01/24 1814   Temp  04/01/24 1814   Pulse 91 04/01/24 1813   Resp 23 04/01/24 1813   SpO2 92 % 04/01/24 1813   Vitals shown include unvalidated device data.    Anesthesia Post Evaluation    Patient location during evaluation: ICU  Patient participation: complete - patient cannot participate  Level of consciousness: sleepy but conscious  Pain management: adequate  Airway patency: patent  Cardiovascular status: acceptable and hemodynamically stable  Respiratory status: acceptable  Hydration status: acceptable  Postoperative Nausea and Vomiting: none  Comments: Patient transported with ambu bag and o2 PEEP 10 to ICU, all Vital signs stable        No notable events documented.

## 2024-04-01 NOTE — PROGRESS NOTES
Physical Therapy                 Therapy Communication Note    Patient Name: Jason Hollins  MRN: [id number]  Today's Date: 4/1/2024     Discipline: Physical Therapy    Missed Visit Reason: Missed Visit Reason:  (Pt going for trach today and on hold 24hrs post.  Will hold PT at this time and re-attempt once medically appropriate.)    Missed Time: Attempt

## 2024-04-01 NOTE — PROGRESS NOTES
Jason Hollins is a 63 y.o. male on day 27 of admission presenting with Soft tissue sarcoma (CMS/HCC).    Subjective   No acute events overnight. Plt count 44k this am after receiving IVIG and 5pk plts yesterday. Hgb stable overnight. Net negative 2.8L for past 24hrs on CVVH. Remains off pressors. Heparin infusion and TFs on hold for trach in OR today.    Objective     Physical Exam  Vitals reviewed.   Constitutional:       Appearance: He is ill-appearing.      Comments: Intubated.   HENT:      Head:      Comments: Edematous face/head/neck     Mouth/Throat:      Mouth: Mucous membranes are moist.      Comments: ETT in place. OG tube in place.  Eyes:      General: Scleral icterus present.      Pupils: Pupils are equal, round, and reactive to light.      Comments: Conjunctival edema. Subconjunctival hemorrhage.   Cardiovascular:      Rate and Rhythm: Normal rate and regular rhythm.      Pulses:           Radial pulses are 1+ on the right side and 1+ on the left side.        Dorsalis pedis pulses are 1+ on the right side and 1+ on the left side.      Heart sounds: Normal heart sounds.   Pulmonary:      Comments: Mechanically ventilated via ETT. Diminished breath sounds bilaterally. Equal chest rise. Current vent settings AC/VC 50%, 550, 18, +10.  Abdominal:      General: Abdomen is protuberant. Bowel sounds are absent. There is distension.      Palpations: Abdomen is soft.      Comments: Obese abdomen.   Genitourinary:     Comments: Penile/scrotal edema.  Musculoskeletal:      Comments: Generalized weakness.   Skin:     General: Skin is warm and dry.      Comments: Anasarca. Right flap site with wound VAC, no output. ANDERSON x 3 all with serosanguinous output.   Neurological:      GCS: GCS eye subscore is 4. GCS verbal subscore is 1. GCS motor subscore is 6.      Comments: Intubated. +cough and gag. Patient opening eyes spontaneously and following simple commands. Weak bilateral hand grasps and wiggles toes in left foot. Unable  "to move RLE.        Last Recorded Vitals  Blood pressure 133/53, pulse 80, temperature 36.5 °C (97.7 °F), temperature source Temporal, resp. rate (!) 27, height 1.778 m (5' 10\"), weight 125 kg (275 lb 5.7 oz), SpO2 98 %.    VS over last 24h  Heart Rate:  [58-89]   Temp:  [36 °C (96.8 °F)-36.5 °C (97.7 °F)]   Resp:  [20-36]   SpO2:  [91 %-98 %]      Scheduled medications  esomeprazole, 40 mg, nasogastric tube, Daily before breakfast  fludrocortisone, 0.1 mg, nasogastric tube, q12h  hydrocortisone sodium succinate, 50 mg, intravenous, q12h  midodrine, 20 mg, orogastric tube, q8h  thiamine, 100 mg, intravenous, Daily      Continuous medications  [Held by provider] heparin, 0-4,000 Units/hr, Last Rate: Stopped (03/30/24 1545)  insulin regular infusion for cardiac surgery, 0-15 Units/hr, Last Rate: 0.6 Units/hr (04/01/24 1600)  lactated Ringer's, 5 mL/hr, Last Rate: 5 mL/hr (04/01/24 1600)  PrismaSol BGK 2/3.5, 2,400 mL/hr, Last Rate: 2,400 mL/hr (04/01/24 0028)      PRN medications  PRN medications: alteplase, calcium gluconate, calcium gluconate, dextrose **OR** glucagon, magnesium sulfate, magnesium sulfate, oxygen      Results for orders placed or performed during the hospital encounter of 03/05/24 (from the past 24 hour(s))   CBC   Result Value Ref Range    WBC 14.2 (H) 4.4 - 11.3 x10*3/uL    nRBC 0.8 (H) 0.0 - 0.0 /100 WBCs    RBC 2.58 (L) 4.50 - 5.90 x10*6/uL    Hemoglobin 7.5 (L) 13.5 - 17.5 g/dL    Hematocrit 22.8 (L) 41.0 - 52.0 %    MCV 88 80 - 100 fL    MCH 29.1 26.0 - 34.0 pg    MCHC 32.9 32.0 - 36.0 g/dL    RDW 20.7 (H) 11.5 - 14.5 %    Platelets 37 (LL) 150 - 450 x10*3/uL   POCT GLUCOSE   Result Value Ref Range    POCT Glucose 235 (H) 74 - 99 mg/dL   POCT GLUCOSE   Result Value Ref Range    POCT Glucose 238 (H) 74 - 99 mg/dL   POCT GLUCOSE   Result Value Ref Range    POCT Glucose 204 (H) 74 - 99 mg/dL   CBC   Result Value Ref Range    WBC 13.0 (H) 4.4 - 11.3 x10*3/uL    nRBC 0.7 (H) 0.0 - 0.0 /100 WBCs    " RBC 2.60 (L) 4.50 - 5.90 x10*6/uL    Hemoglobin 7.9 (L) 13.5 - 17.5 g/dL    Hematocrit 22.1 (L) 41.0 - 52.0 %    MCV 85 80 - 100 fL    MCH 30.4 26.0 - 34.0 pg    MCHC 35.7 32.0 - 36.0 g/dL    RDW 20.0 (H) 11.5 - 14.5 %    Platelets 44 (L) 150 - 450 x10*3/uL   Coagulation Screen   Result Value Ref Range    Protime 19.0 (H) 9.8 - 12.8 seconds    INR 1.7 (H) 0.9 - 1.1    aPTT 29 27 - 38 seconds   Magnesium   Result Value Ref Range    Magnesium 2.58 (H) 1.60 - 2.40 mg/dL   Renal Function Panel   Result Value Ref Range    Glucose 170 (H) 74 - 99 mg/dL    Sodium 136 136 - 145 mmol/L    Potassium 4.4 3.5 - 5.3 mmol/L    Chloride 100 98 - 107 mmol/L    Bicarbonate 25 21 - 32 mmol/L    Anion Gap 15 10 - 20 mmol/L    Urea Nitrogen 61 (H) 6 - 23 mg/dL    Creatinine 1.81 (H) 0.50 - 1.30 mg/dL    eGFR 42 (L) >60 mL/min/1.73m*2    Calcium 8.8 8.6 - 10.6 mg/dL    Phosphorus 3.0 2.5 - 4.9 mg/dL    Albumin 3.5 3.4 - 5.0 g/dL   Calcium, ionized   Result Value Ref Range    POCT Calcium, Ionized 1.19 1.1 - 1.33 mmol/L   Fibrinogen   Result Value Ref Range    Fibrinogen 176 (L) 200 - 400 mg/dL   Hepatic function panel   Result Value Ref Range    Albumin 3.5 3.4 - 5.0 g/dL    Bilirubin, Total 19.0 (H) 0.0 - 1.2 mg/dL    Bilirubin, Direct 14.1 (H) 0.0 - 0.3 mg/dL    Alkaline Phosphatase 139 (H) 33 - 136 U/L     (H) 10 - 52 U/L     (H) 9 - 39 U/L    Total Protein 5.8 (L) 6.4 - 8.2 g/dL   Blood Gas Arterial Full Panel   Result Value Ref Range    POCT pH, Arterial 7.40 7.38 - 7.42 pH    POCT pCO2, Arterial 39 38 - 42 mm Hg    POCT pO2, Arterial 94 85 - 95 mm Hg    POCT SO2, Arterial 98 94 - 100 %    POCT Oxy Hemoglobin, Arterial 96.1 94.0 - 98.0 %    POCT Hematocrit Calculated, Arterial 24.0 (L) 41.0 - 52.0 %    POCT Sodium, Arterial 133 (L) 136 - 145 mmol/L    POCT Potassium, Arterial 4.5 3.5 - 5.3 mmol/L    POCT Chloride, Arterial 102 98 - 107 mmol/L    POCT Ionized Calcium, Arterial 1.23 1.10 - 1.33 mmol/L    POCT Glucose,  Arterial 167 (H) 74 - 99 mg/dL    POCT Lactate, Arterial 2.4 (H) 0.4 - 2.0 mmol/L    POCT Base Excess, Arterial -0.5 -2.0 - 3.0 mmol/L    POCT HCO3 Calculated, Arterial 24.2 22.0 - 26.0 mmol/L    POCT Hemoglobin, Arterial 7.9 (L) 13.5 - 17.5 g/dL    POCT Anion Gap, Arterial 11 10 - 25 mmo/L    Patient Temperature 37.0 degrees Celsius    FiO2 50 %   POCT GLUCOSE   Result Value Ref Range    POCT Glucose 160 (H) 74 - 99 mg/dL   POCT GLUCOSE   Result Value Ref Range    POCT Glucose 121 (H) 74 - 99 mg/dL   POCT GLUCOSE   Result Value Ref Range    POCT Glucose 125 (H) 74 - 99 mg/dL   Prepare RBC: 2 Units   Result Value Ref Range    PRODUCT CODE U7796N34     Unit Number [id number]     Unit ABO O     Unit RH POS     XM INTEP COMP     Dispense Status XM     Blood Expiration Date April 14, 2024 23:59 EDT     PRODUCT BLOOD TYPE 5100     UNIT VOLUME 350     PRODUCT CODE [id number]     Unit Number [id number]     Unit ABO O     Unit RH POS     XM INTEP COMP     Dispense Status XM     Blood Expiration Date April 15, 2024 23:59 EDT     PRODUCT BLOOD TYPE 5100     UNIT VOLUME 288    POCT GLUCOSE   Result Value Ref Range    POCT Glucose 130 (H) 74 - 99 mg/dL   POCT GLUCOSE   Result Value Ref Range    POCT Glucose 159 (H) 74 - 99 mg/dL   POCT GLUCOSE   Result Value Ref Range    POCT Glucose 163 (H) 74 - 99 mg/dL   POCT GLUCOSE   Result Value Ref Range    POCT Glucose 164 (H) 74 - 99 mg/dL   POCT GLUCOSE   Result Value Ref Range    POCT Glucose 172 (H) 74 - 99 mg/dL   CBC   Result Value Ref Range    WBC 10.4 4.4 - 11.3 x10*3/uL    nRBC 0.9 (H) 0.0 - 0.0 /100 WBCs    RBC 2.65 (L) 4.50 - 5.90 x10*6/uL    Hemoglobin 7.7 (L) 13.5 - 17.5 g/dL    Hematocrit 23.1 (L) 41.0 - 52.0 %    MCV 87 80 - 100 fL    MCH 29.1 26.0 - 34.0 pg    MCHC 33.3 32.0 - 36.0 g/dL    RDW 21.5 (H) 11.5 - 14.5 %    Platelets 50 (L) 150 - 450 x10*3/uL        Assessment/Plan   Jason Hollins is a 63 y.o. 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 Hawkins and Dr. Smith on 3/5/24.  Post operative course was uncomplicated and patient was on RNF until 3/20 for prolonged bed rest to avoid hip flexion to maintain flap integrity.  Patient was on prophylactic lovenox while on bedrest.     3/20: Cardiopulmonary arrest s/p CPR with ROSC after TPA, overnight started on heparin, epo, increased pressor requirements, increased swelling at flap site.     3/21: Hemorrhagic shock, MTP. Firm and large right flap site. Return to OR s/p Exploration of right thigh wound, Evacuation of hematoma. Suture ligation of multiple bleeding vessel and several points in gluteal area.     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 Hawkins and Dr. Smith on 3/5/24 s/p cardiopulmonary arrest with ROSC 3/20. Documented to have followed commands off sedation. CT head 3/22 without acute process. Weaned off cisatracurium and propofol overnight 3/23-3/24. Ketamine weaned off 3/25 and Fentanyl weaned off 3/26 am. Repeat CT Head 3/26 without acute process. MRI Brain 3/30, negative for cerebral infarction or hemorrhage. Neuro exam - Patient opening eyes to verbal stimulation and following simple commands. Weak bilateral hand grasps and wiggles toes in left foot. Unable to move RLE. Off all sedation.  - ongoing neuro and pain assessments  - avoid CNS depressants as able  - PT/OT -> PROM  - no need for soft wrist restraints at this time -> continue to reassess     CV: History of HTN, HLD. Acute cardiopulmonary arrest 2/2 to possible massive PE vs massive STEMI, received multiple rounds of CPR and one defibrillation.   Sustained ROSC achieved after TPA bolus. On high dose levophed, epinephrine, vaso, angioT2. Plan on 3/21 was for ECMO which was aborted secondary to large hemhorrge at right flap site. Had MTP and taken OR with 5L evacuated. ECHO 3/21: Normal LV, Mod enlarged RV, slight suggestion of a Townsend's sign / RV strain, low normal RV function. ECHO 3/22 with hyperdynamic LV. New onset Afib with RVR overnight 3/22-3/23, dosed with 150mg amio x2, started infusion at 1mg/min thereafter. AT2 weaned off. Amio infusion discontinued 3/25. Lactate downtrending. Vaso and epi weaned off. Remains in NSR. iEpo weaned off 3/27. Repeat TTE 3/29 essentially unchanged. Net negative 2.8L for past 24hrs. Remains off levo.  - continuous EKG/abp/cvp monitoring  - Goal map range 65-90  - Continue midodrine 20mg q8h  - Continue florinef 0.1mg BID   - Continue SDS with hydrocortisone 50mg q12h  - Holding heparin infusion for OR -> plan to resume post-op  - continue aggressive fluid removal with CVVH as hemodynamics permit     PULM: Arrived to SICU intubated julio c-CPR. Concern for massive PE. Started on iEpo, currently on 0.05. Hypoxic respiratory failure. Severe ARDS. ETT exchanged 3/23. CT Chest 3/26 with interval JOHN consolidative/ground glass opacity. Currently on AC/VC 50%, 550, 18, +10.   - ARDSnet lung protective strategies  - Wean FiO2 as tolerated  - ABGs prn  - additional pulm toilet prn  - daily CXR  - ENT consulted for trach placement -> OR today Monday 4/1    ENT: Had epistaxis 3/23, completed afrin bid x3 days  - monitor, low threshold to consult ENT  - avoid NG placement for now     GI: NPO. Shock liver, pancreatitis. Elevated TG. Elevated lactate. Consulted ACS for potential bowel ischemia as cause of persistent lactic acidosis. CT imaging 3/22 with evidence of pancreatitis. No acute surgical indication per ACS. RUQ US 3/22 with thickening of GB wall without cholelithiasis. CT A/P 3/26 negative for acute bleed. LFTs downtrending.  Worsening hyperbilirubinemia. Amylase and lipase now WNL.   - holding TFs for OR today  - plan for dobhoff placement intra-op  - prostat daily  - PPI for GI ppx  - Trend LFTs daily  - obtain repeat RUQ US given worsening hyperbilirubinemia  - IV thiamine 100mg IV x7 days     : No history of renal disease. Baseline creatinine 0.6. Anuric RUTH. Elevated CK, downtrending. Metabolic acidosis, total body fluid overload, hyperkalemia. Started on CVVH 3/21, stopped bicarb infusion 3/22. Marino removed 3/24. Hyponatremia resolved. Net negative 2.8L for past 24hrs.  - Nephrology following, appreciate recs  - continue CVVH -> pull fluid as hemodynamics allow (goal 2-3L)  - RFP BID and PRN  - Replete electrolytes judiciously  - Daily bladder scan     HEME: Patient was on ppx lovenox for DVT prophylaxis prior to cardiopulmonary arrest. Concern for massive PE patient received bolus of TPA during CPR. Started on heparin infusion overnight given concern for PE. Hemorrhagic shock 3/21, MTP and back to OR s/p Exploration of right thigh woundEvacuation of hematoma. Suture ligation of multiple bleeding vessel and several points in gluteal area. Hematology consulted 3/22 to r/o HLH. Transfused 1 RBC overnight 3/22-3/23. Thrombocytopenia, coagulapathy, hypofibrinogenemia. LE Duplex 3/25 +acute occlusive R soleal DVT. Received 2u PRBC and 1u FFP on 3/26. Heparin infusion discontinued 3/26 to r/o HIT and transitioned to bival. PF4 negative, resumed heparin infuision 3/27. Elevated IL2R and decreased NK function. C/f HLH (low suspicion), empirically treated with decadron (3/28-3/31). Thrombocytopenia on 3/30 to 18k, received 5pk, did not increment. Heparin held. Thrombocytopenia likely 2/2 to consumptive process, hematology following. Received IVIG and 5pk plts 3/31. Plt count 44k this am.  - cbc, coags bid and prn  - fibrinogen daily  - continue holding heparin infusion for OR today  - Hematology following, appreciate recs -> maintain Plt  count >35k  - ongoing monitoring for s/s bleeding  - close surveillance of RLE and drains  - Vasc Med following, appreciate recs  - maintain active T&S (3/31)    ENDO: History of DM2. A1c 7.5%. TSH WNL 1/2024.   - wean insulin infusion as able   - q1h accuchecks per ICU protocol     ID: Afebrile. Leukocytosis. On broad antimicrobial therapy. MRSA screen + 2/28/24. Pan cultures sent 3/22. Sputum NTF, +MRSA screen (completed 5 day course mupirocin), UCx and BCx negative. Completed 7 day course vanc/zosyn 3/27.   - temp q4h, wbc daily  - ongoing monitoring for s/s infection    Lines:  - right brachial arterial line (3/20)  - RIJ trialysis (3/27)  - left subclavian MAC with mini MAC (3/20)  - PIV x1     I have discussed the case with the resident/advanced practice provider. I have personally performed a history, physical exam, and my own medical decision making. I have reviewed the note and agree with the findings and plan with the following exceptions as identified below. I have personally provided 37 minutes of critical care time exclusive of time spent on separately billable procedures. Time includes review of laboratory data, radiology results, discussion with consultants, family and monitoring for potential decompensation. Interventions were performed as documented above.      Family/Surrogate updated with plan of care.  Code status addressed/up to date.     Alan Xiong MD  SICU phone [phone]

## 2024-04-01 NOTE — ANESTHESIA PREPROCEDURE EVALUATION
Patient: Jason Hollins    Procedure Information       Date/Time: 04/01/24 1468    Procedure: Creation Tracheostomy (Bilateral)    Location: [facility] OR 05 / [facility] OR    Surgeons: Osman Perkins MD            Relevant Problems   Anesthesia  Currently intubated, here for tracheostomy in OR.      Cardiac  Arrest on 3/20/24.  No longer on pressors.   (+) Cardiopulmonary arrest (CMS/HCC)   (+) Hyperlipidemia      Pulmonary  Intubated, tachypnic   (+) Pulmonary embolus (CMS/HCC) (With CP arrest, s/p TPA)      Neuro  Intubated, not sedated.  Follows simple commands on left (thumbs up and wiggles toes).  Nothing on right.      /Renal   (+) Acute kidney injury (nontraumatic) (CMS/HCC)      Endocrine   (+) Class 2 obesity with body mass index (BMI) of 38.0 to 38.9 in adult   (+) DM type 2 without retinopathy (CMS/HCC)   (+) Diabetes mellitus type 2 in obese   (+) Severe obesity (BMI 35.0-39.9) with comorbidity (CMS/HCC)   (+) Type 2 diabetes mellitus with hyperglycemia (CMS/HCC)      Hematology   (+) Anemia   (+) Thrombocytopenia (CMS/HCC)      ID   (+) Candidal intertrigo   (+) Viral illness       Clinical information reviewed:   Tobacco  Allergies  Meds  Problems  Med Hx  Surg Hx   Fam Hx  Soc   Hx        NPO Detail:  No data recorded     Vitals:    04/01/24 1600   BP:    Pulse: 80   Resp: (!) 27   Temp: 36.5 °C (97.7 °F)   SpO2: 98%       Physical Exam    Airway  Mallampati: unable to assess     Cardiovascular - normal exam     Dental    Pulmonary   Comments: Intubated, not sedated, tachypnic   Abdominal   (+) obese       Other findings: Neuro exam: follows simple commands (thumbs up and wiggles toes) on left.  Nothing on right.      Anesthesia Plan    History of general anesthesia?: yes  History of complications of general anesthesia?: no    ASA 4     general   (2u pRBCs, 2u plts, 2u FFP available.)  intravenous induction     Plan discussed with attending and CAA.

## 2024-04-01 NOTE — SIGNIFICANT EVENT
Corpak Insertion    Corpak enteral feeding tube placed with Saperion technology.  Tube passed easily via R nare and appears gastric.  Bridled in place at 65 cm.  KUB ordered to confirm placement.     Remy Peter, APRN-CNP  SICU phone [phone]

## 2024-04-01 NOTE — SIGNIFICANT EVENT
SUPPORTIVE AND PALLIATIVE ONCOLOGY    Supportive and Palliative Oncology will sign off at this time. Please contact us via pager [phone] for uncontrolled symptoms or for further assistance. Thank you for involving us in the care of this patient.    SIGNATURE: PHILLIP Nails-TUNG  PAGER/CONTACT:  Contact information:  Supportive and Palliative Oncology  Monday-Friday 8 AM-5 PM  Epic Secure chat or pager [phone].  After hours and weekends:  pager [phone]

## 2024-04-01 NOTE — PROGRESS NOTES
Jason Hollins is a 63 y.o. male on day 27 of admission presenting with Soft tissue sarcoma (CMS/HCC).      Subjective   4/1: Pt seen intubated. FiO2 50% on vent. Not on vasopressor. CVVH running.       Objective          Vitals 24HR  Heart Rate:  [58-89]   Temp:  [36 °C (96.8 °F)-36.5 °C (97.7 °F)]   Resp:  [20-36]   SpO2:  [91 %-98 %]         Intake/Output last 3 Shifts:    Intake/Output Summary (Last 24 hours) at 4/1/2024 1622  Last data filed at 4/1/2024 1600  Gross per 24 hour   Intake 653.05 ml   Output 4151 ml   Net -3497.95 ml       Physical Exam  General appearance: intubated  Eyes: non-icteric  Skin: no apparent rash  Heart: RRR  Lungs: good airflow throughout,FiO2 50% on vent  Abdomen: soft, non-distended   Extremities: 1+ B/l leg edema  : daniels in place   Neuro: follows commands by squeezing hand  ACCESS: -  right I J trialysis    Current Facility-Administered Medications:     alteplase (Cathflo Activase) injection 2 mg, 2 mg, intra-catheter, PRN, RAQUEL Bernal    calcium gluconate in NS IV 1 g, 1 g, intravenous, q6h PRN, RAQUEL Barr DNP, Stopped at 03/31/24 0634    calcium gluconate in NS IV 2 g, 2 g, intravenous, q6h PRN, RAQUEL Barr DNP, Last Rate: 100 mL/hr at 03/26/24 1458, 2 g at 03/26/24 1458    dextrose 50 % injection 25 g, 25 g, intravenous, q15 min PRN **OR** glucagon (Glucagen) injection 1 mg, 1 mg, intramuscular, q15 min PRN, Fernandez M Kaniecki, APRN-CNP, DNP    esomeprazole (NexIUM) suspension 40 mg, 40 mg, nasogastric tube, Daily before breakfast, RAQUEL Alejo, 40 mg at 04/01/24 0913    fludrocortisone (Florinef) tablet 0.1 mg, 0.1 mg, nasogastric tube, q12h, Vern SIMMONS Pad, DO, 0.1 mg at 04/01/24 1255    [Held by provider] heparin 25,000 Units in dextrose 5% 250 mL (100 Units/mL) infusion (premix), 0-4,000 Units/hr, intravenous, Continuous, PHILLIP Alejo-CNP, Stopped at 03/30/24 1545    hydrocortisone sod succ (PF)  (Solu-CORTEF) injection 50 mg, 50 mg, intravenous, q12h, Vern L Pad, DO, 50 mg at 04/01/24 0349    insulin regular 100 unit/100 mL (1 unit/mL) in 0.9 % NaCl infusion, 0-15 Units/hr, intravenous, Continuous, Jean Rouse MD, Last Rate: 0.6 mL/hr at 04/01/24 1600, 0.6 Units/hr at 04/01/24 1600    lactated Ringer's infusion, 5 mL/hr, intravenous, Continuous, Gracie Skaggs, APRN-CNP, Last Rate: 5 mL/hr at 04/01/24 1600, 5 mL/hr at 04/01/24 1600    magnesium sulfate IV 2 g, 2 g, intravenous, q6h PRN, PHILLIP Barr-CNP, DNP, Stopped at 03/24/24 1726    magnesium sulfate IV 4 g, 4 g, intravenous, q6h PRN, RAQUEL Barr, DNP, Stopped at 03/21/24 0706    midodrine (Proamatine) tablet 20 mg, 20 mg, orogastric tube, q8h, Vern L Pad, DO, 20 mg at 04/01/24 0912    oxygen (O2) therapy, , inhalation, Continuous PRN - O2/gases, Joby Baez, DO, Rate Verify at 04/01/24 1535    PrismaSol BGK 2/3.5 CRRT solution, 2,400 mL/hr, CRRT, Continuous, Harsha Baldwin MD, Last Rate: 2,400 mL/hr at 04/01/24 0028, 2,400 mL/hr at 04/01/24 0028    thiamine (Vitamin B1) injection 100 mg, 100 mg, intravenous, Daily, Lenny Higgins, DO, 100 mg at 04/01/24 0912      Assessment/Plan   Jason Hollins is a 63 y.o. 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 Hawkins and Dr. Smith on 3/5/24. On 3/20, he developed massive PE and was given TPA, taken to OR in setting of increased swelling at flap site- hematoma for exploration and evacuation. Nephrology following for RUTH.    Acute Kidney Injury on Dialysis (RUTH-D)  - etiology hemodynamic from hemorrhagic shock  - on CVVH since 3/21 via R I J trialysis  - Cr baseline 0.6  - electrolytes: K 4.5, HCO3 25  - anuric; 24h I/Os net - 2.8L  - no vasopressor - off levo since 3/31  AM  - FiO2 50% on vent  - CVVH with 2k bath    Myxoid Chondrosarcoma s/p wide resection 3/5/2024  Thrombocytopenia  - s/p IVIG on 3/31 as per Oncology    Plan  - had planned to switch to CVVH to iHD given improvement in hemodynamic status, however upon discussion with CTICU team we agree to continue CVVH as per current orders to continue net fluid removal  - bladder scan once per shift to monitor for renal recovery  - daily BMP; replace phos if low while on CRRT    D/w Dr. Jonathon Duffy MD  Nephrology Fellow PGY 5  Contact via secure chat  Nephrology Pager # [phone] ([phone])

## 2024-04-02 ENCOUNTER — APPOINTMENT (OUTPATIENT)
Dept: RADIOLOGY | Facility: HOSPITAL | Age: 64
DRG: 003 | End: 2024-04-02
Payer: COMMERCIAL

## 2024-04-02 ENCOUNTER — APPOINTMENT (OUTPATIENT)
Dept: CARDIOLOGY | Facility: HOSPITAL | Age: 64
DRG: 003 | End: 2024-04-02
Payer: COMMERCIAL

## 2024-04-02 LAB
ALBUMIN SERPL BCP-MCNC: 3.1 G/DL (ref 3.4–5)
ALBUMIN SERPL BCP-MCNC: 3.1 G/DL (ref 3.4–5)
ALP SERPL-CCNC: 167 U/L (ref 33–136)
ALT SERPL W P-5'-P-CCNC: 155 U/L (ref 10–52)
ANION GAP BLDA CALCULATED.4IONS-SCNC: 12 MMO/L (ref 10–25)
ANION GAP SERPL CALC-SCNC: 16 MMOL/L (ref 10–20)
APTT PPP: 41 SECONDS (ref 27–38)
APTT PPP: 59 SECONDS (ref 27–38)
APTT PPP: 93 SECONDS (ref 27–38)
AST SERPL W P-5'-P-CCNC: 134 U/L (ref 9–39)
BASE EXCESS BLDA CALC-SCNC: -2 MMOL/L (ref -2–3)
BILIRUB DIRECT SERPL-MCNC: 14.4 MG/DL (ref 0–0.3)
BILIRUB SERPL-MCNC: 20.1 MG/DL (ref 0–1.2)
BODY SURFACE AREA: 2.48 M2
BODY TEMPERATURE: 37 DEGREES CELSIUS
BUN SERPL-MCNC: 67 MG/DL (ref 6–23)
CA-I BLD-SCNC: 1.15 MMOL/L (ref 1.1–1.33)
CA-I BLDA-SCNC: 1.15 MMOL/L (ref 1.1–1.33)
CALCIUM SERPL-MCNC: 8.7 MG/DL (ref 8.6–10.6)
CHLORIDE BLDA-SCNC: 101 MMOL/L (ref 98–107)
CHLORIDE SERPL-SCNC: 99 MMOL/L (ref 98–107)
CO2 SERPL-SCNC: 24 MMOL/L (ref 21–32)
CREAT SERPL-MCNC: 2.1 MG/DL (ref 0.5–1.3)
EGFRCR SERPLBLD CKD-EPI 2021: 35 ML/MIN/1.73M*2
EJECTION FRACTION APICAL 4 CHAMBER: 65.5
ERYTHROCYTE [DISTWIDTH] IN BLOOD BY AUTOMATED COUNT: 19.8 % (ref 11.5–14.5)
ERYTHROCYTE [DISTWIDTH] IN BLOOD BY AUTOMATED COUNT: 20.4 % (ref 11.5–14.5)
ERYTHROCYTE [DISTWIDTH] IN BLOOD BY AUTOMATED COUNT: 20.9 % (ref 11.5–14.5)
FIBRINOGEN PPP-MCNC: 176 MG/DL (ref 200–400)
GLUCOSE BLD MANUAL STRIP-MCNC: 142 MG/DL (ref 74–99)
GLUCOSE BLD MANUAL STRIP-MCNC: 144 MG/DL (ref 74–99)
GLUCOSE BLD MANUAL STRIP-MCNC: 145 MG/DL (ref 74–99)
GLUCOSE BLD MANUAL STRIP-MCNC: 150 MG/DL (ref 74–99)
GLUCOSE BLD MANUAL STRIP-MCNC: 154 MG/DL (ref 74–99)
GLUCOSE BLD MANUAL STRIP-MCNC: 154 MG/DL (ref 74–99)
GLUCOSE BLD MANUAL STRIP-MCNC: 156 MG/DL (ref 74–99)
GLUCOSE BLD MANUAL STRIP-MCNC: 172 MG/DL (ref 74–99)
GLUCOSE BLD MANUAL STRIP-MCNC: 173 MG/DL (ref 74–99)
GLUCOSE BLD MANUAL STRIP-MCNC: 181 MG/DL (ref 74–99)
GLUCOSE BLD MANUAL STRIP-MCNC: 194 MG/DL (ref 74–99)
GLUCOSE BLD MANUAL STRIP-MCNC: 198 MG/DL (ref 74–99)
GLUCOSE BLD MANUAL STRIP-MCNC: 200 MG/DL (ref 74–99)
GLUCOSE BLD MANUAL STRIP-MCNC: 202 MG/DL (ref 74–99)
GLUCOSE BLD MANUAL STRIP-MCNC: 207 MG/DL (ref 74–99)
GLUCOSE BLD MANUAL STRIP-MCNC: 210 MG/DL (ref 74–99)
GLUCOSE BLD MANUAL STRIP-MCNC: 215 MG/DL (ref 74–99)
GLUCOSE BLDA-MCNC: 217 MG/DL (ref 74–99)
GLUCOSE SERPL-MCNC: 210 MG/DL (ref 74–99)
HCO3 BLDA-SCNC: 21.9 MMOL/L (ref 22–26)
HCT VFR BLD AUTO: 22.2 % (ref 41–52)
HCT VFR BLD AUTO: 22.6 % (ref 41–52)
HCT VFR BLD AUTO: 23.5 % (ref 41–52)
HCT VFR BLD EST: 25 % (ref 41–52)
HGB BLD-MCNC: 7.9 G/DL (ref 13.5–17.5)
HGB BLD-MCNC: 7.9 G/DL (ref 13.5–17.5)
HGB BLD-MCNC: 8.1 G/DL (ref 13.5–17.5)
HGB BLDA-MCNC: 8.4 G/DL (ref 13.5–17.5)
INHALED O2 CONCENTRATION: 50 %
INR PPP: 2.1 (ref 0.9–1.1)
INR PPP: 2.2 (ref 0.9–1.1)
INR PPP: 2.2 (ref 0.9–1.1)
LACTATE BLDA-SCNC: 1.5 MMOL/L (ref 0.4–2)
LEFT ATRIUM VOLUME AREA LENGTH INDEX BSA: 28 ML/M2
LEFT VENTRICLE INTERNAL DIMENSION DIASTOLE: 5.2 CM (ref 3.5–6)
LEFT VENTRICULAR OUTFLOW TRACT DIAMETER: 2.3 CM
LV EJECTION FRACTION BIPLANE: 69 %
MAGNESIUM SERPL-MCNC: 2.31 MG/DL (ref 1.6–2.4)
MCH RBC QN AUTO: 29.4 PG (ref 26–34)
MCH RBC QN AUTO: 29.6 PG (ref 26–34)
MCH RBC QN AUTO: 29.7 PG (ref 26–34)
MCHC RBC AUTO-ENTMCNC: 33.6 G/DL (ref 32–36)
MCHC RBC AUTO-ENTMCNC: 35.6 G/DL (ref 32–36)
MCHC RBC AUTO-ENTMCNC: 35.8 G/DL (ref 32–36)
MCV RBC AUTO: 83 FL (ref 80–100)
MCV RBC AUTO: 83 FL (ref 80–100)
MCV RBC AUTO: 87 FL (ref 80–100)
NRBC BLD-RTO: 0.5 /100 WBCS (ref 0–0)
NRBC BLD-RTO: 0.6 /100 WBCS (ref 0–0)
NRBC BLD-RTO: 0.6 /100 WBCS (ref 0–0)
OXYHGB MFR BLDA: 96.3 % (ref 94–98)
PCO2 BLDA: 33 MM HG (ref 38–42)
PH BLDA: 7.43 PH (ref 7.38–7.42)
PHOSPHATE SERPL-MCNC: 3.3 MG/DL (ref 2.5–4.9)
PLATELET # BLD AUTO: 55 X10*3/UL (ref 150–450)
PLATELET # BLD AUTO: 58 X10*3/UL (ref 150–450)
PLATELET # BLD AUTO: 68 X10*3/UL (ref 150–450)
PO2 BLDA: 119 MM HG (ref 85–95)
POTASSIUM BLDA-SCNC: 4.7 MMOL/L (ref 3.5–5.3)
POTASSIUM SERPL-SCNC: 4.7 MMOL/L (ref 3.5–5.3)
PROT SERPL-MCNC: 5.2 G/DL (ref 6.4–8.2)
PROTHROMBIN TIME: 24.2 SECONDS (ref 9.8–12.8)
PROTHROMBIN TIME: 24.5 SECONDS (ref 9.8–12.8)
PROTHROMBIN TIME: 24.6 SECONDS (ref 9.8–12.8)
RBC # BLD AUTO: 2.67 X10*6/UL (ref 4.5–5.9)
RBC # BLD AUTO: 2.69 X10*6/UL (ref 4.5–5.9)
RBC # BLD AUTO: 2.73 X10*6/UL (ref 4.5–5.9)
SAO2 % BLDA: 100 % (ref 94–100)
SODIUM BLDA-SCNC: 130 MMOL/L (ref 136–145)
SODIUM SERPL-SCNC: 134 MMOL/L (ref 136–145)
UFH PPP CHRO-ACNC: 0.1 IU/ML
UFH PPP CHRO-ACNC: 0.2 IU/ML
UFH PPP CHRO-ACNC: 0.4 IU/ML
WBC # BLD AUTO: 8.1 X10*3/UL (ref 4.4–11.3)
WBC # BLD AUTO: 9.3 X10*3/UL (ref 4.4–11.3)
WBC # BLD AUTO: 9.5 X10*3/UL (ref 4.4–11.3)

## 2024-04-02 PROCEDURE — 83735 ASSAY OF MAGNESIUM: CPT

## 2024-04-02 PROCEDURE — 85027 COMPLETE CBC AUTOMATED: CPT

## 2024-04-02 PROCEDURE — 2500000001 HC RX 250 WO HCPCS SELF ADMINISTERED DRUGS (ALT 637 FOR MEDICARE OP)

## 2024-04-02 PROCEDURE — 99024 POSTOP FOLLOW-UP VISIT: CPT | Performed by: STUDENT IN AN ORGANIZED HEALTH CARE EDUCATION/TRAINING PROGRAM

## 2024-04-02 PROCEDURE — 2500000004 HC RX 250 GENERAL PHARMACY W/ HCPCS (ALT 636 FOR OP/ED)

## 2024-04-02 PROCEDURE — 85384 FIBRINOGEN ACTIVITY: CPT

## 2024-04-02 PROCEDURE — 71045 X-RAY EXAM CHEST 1 VIEW: CPT | Performed by: RADIOLOGY

## 2024-04-02 PROCEDURE — 2500000005 HC RX 250 GENERAL PHARMACY W/O HCPCS

## 2024-04-02 PROCEDURE — 93308 TTE F-UP OR LMTD: CPT

## 2024-04-02 PROCEDURE — 82330 ASSAY OF CALCIUM: CPT

## 2024-04-02 PROCEDURE — P9047 ALBUMIN (HUMAN), 25%, 50ML: HCPCS | Mod: JZ

## 2024-04-02 PROCEDURE — 99291 CRITICAL CARE FIRST HOUR: CPT

## 2024-04-02 PROCEDURE — 90937 HEMODIALYSIS REPEATED EVAL: CPT

## 2024-04-02 PROCEDURE — 71045 X-RAY EXAM CHEST 1 VIEW: CPT

## 2024-04-02 PROCEDURE — 93308 TTE F-UP OR LMTD: CPT | Performed by: INTERNAL MEDICINE

## 2024-04-02 PROCEDURE — 84132 ASSAY OF SERUM POTASSIUM: CPT

## 2024-04-02 PROCEDURE — 82947 ASSAY GLUCOSE BLOOD QUANT: CPT

## 2024-04-02 PROCEDURE — 84075 ASSAY ALKALINE PHOSPHATASE: CPT

## 2024-04-02 PROCEDURE — 85610 PROTHROMBIN TIME: CPT

## 2024-04-02 PROCEDURE — 99232 SBSQ HOSP IP/OBS MODERATE 35: CPT

## 2024-04-02 PROCEDURE — 94003 VENT MGMT INPAT SUBQ DAY: CPT

## 2024-04-02 PROCEDURE — 99233 SBSQ HOSP IP/OBS HIGH 50: CPT | Performed by: STUDENT IN AN ORGANIZED HEALTH CARE EDUCATION/TRAINING PROGRAM

## 2024-04-02 PROCEDURE — 37799 UNLISTED PX VASCULAR SURGERY: CPT

## 2024-04-02 PROCEDURE — 5A1D80Z PERFORMANCE OF URINARY FILTRATION, PROLONGED INTERMITTENT, 6-18 HOURS PER DAY: ICD-10-PCS | Performed by: STUDENT IN AN ORGANIZED HEALTH CARE EDUCATION/TRAINING PROGRAM

## 2024-04-02 PROCEDURE — 2020000001 HC ICU ROOM DAILY

## 2024-04-02 PROCEDURE — 99024 POSTOP FOLLOW-UP VISIT: CPT

## 2024-04-02 PROCEDURE — 99222 1ST HOSP IP/OBS MODERATE 55: CPT | Performed by: STUDENT IN AN ORGANIZED HEALTH CARE EDUCATION/TRAINING PROGRAM

## 2024-04-02 PROCEDURE — 99291 CRITICAL CARE FIRST HOUR: CPT | Performed by: ANESTHESIOLOGY

## 2024-04-02 PROCEDURE — 85520 HEPARIN ASSAY: CPT

## 2024-04-02 RX ORDER — NOREPINEPHRINE BITARTRATE/D5W 8 MG/250ML
.01-.1 PLASTIC BAG, INJECTION (ML) INTRAVENOUS CONTINUOUS
Status: DISCONTINUED | OUTPATIENT
Start: 2024-04-02 | End: 2024-04-03

## 2024-04-02 RX ORDER — NOREPINEPHRINE BITARTRATE/D5W 8 MG/250ML
.01-.1 PLASTIC BAG, INJECTION (ML) INTRAVENOUS CONTINUOUS
Status: DISCONTINUED | OUTPATIENT
Start: 2024-04-02 | End: 2024-04-02

## 2024-04-02 RX ORDER — ALBUMIN HUMAN 250 G/1000ML
25 SOLUTION INTRAVENOUS EVERY 6 HOURS
Status: COMPLETED | OUTPATIENT
Start: 2024-04-02 | End: 2024-04-03

## 2024-04-02 RX ORDER — DEXMEDETOMIDINE HYDROCHLORIDE 4 UG/ML
.2-1.5 INJECTION, SOLUTION INTRAVENOUS CONTINUOUS
Status: DISCONTINUED | OUTPATIENT
Start: 2024-04-02 | End: 2024-04-03

## 2024-04-02 RX ADMIN — INSULIN HUMAN 6.5 UNITS/HR: 1 INJECTION, SOLUTION INTRAVENOUS at 14:16

## 2024-04-02 RX ADMIN — FLUDROCORTISONE ACETATE 0.1 MG: 0.1 TABLET ORAL at 13:18

## 2024-04-02 RX ADMIN — PERFLUTREN 1 ML OF DILUTION: 6.52 INJECTION, SUSPENSION INTRAVENOUS at 10:07

## 2024-04-02 RX ADMIN — FLUDROCORTISONE ACETATE 0.1 MG: 0.1 TABLET ORAL at 01:24

## 2024-04-02 RX ADMIN — HEPARIN SODIUM 1500 UNITS/HR: 10000 INJECTION, SOLUTION INTRAVENOUS at 15:32

## 2024-04-02 RX ADMIN — MIDODRINE HYDROCHLORIDE 20 MG: 10 TABLET ORAL at 18:12

## 2024-04-02 RX ADMIN — INSULIN HUMAN 3.5 UNITS/HR: 1 INJECTION, SOLUTION INTRAVENOUS at 16:14

## 2024-04-02 RX ADMIN — NOREPINEPHRINE BITARTRATE 0.02 MCG/KG/MIN: 8 INJECTION, SOLUTION INTRAVENOUS at 20:01

## 2024-04-02 RX ADMIN — INSULIN HUMAN 2 UNITS/HR: 1 INJECTION, SOLUTION INTRAVENOUS at 00:49

## 2024-04-02 RX ADMIN — ESOMEPRAZOLE MAGNESIUM 40 MG: 40 FOR SUSPENSION ORAL at 10:53

## 2024-04-02 RX ADMIN — INSULIN HUMAN 3 UNITS/HR: 1 INJECTION, SOLUTION INTRAVENOUS at 08:26

## 2024-04-02 RX ADMIN — INSULIN HUMAN 4 UNITS/HR: 1 INJECTION, SOLUTION INTRAVENOUS at 10:21

## 2024-04-02 RX ADMIN — ALBUMIN HUMAN 25 G: 0.25 SOLUTION INTRAVENOUS at 15:24

## 2024-04-02 RX ADMIN — ALBUMIN HUMAN 25 G: 0.25 SOLUTION INTRAVENOUS at 20:43

## 2024-04-02 RX ADMIN — INSULIN HUMAN 3.5 UNITS/HR: 1 INJECTION, SOLUTION INTRAVENOUS at 15:16

## 2024-04-02 RX ADMIN — INSULIN HUMAN 3.5 UNITS/HR: 1 INJECTION, SOLUTION INTRAVENOUS at 18:13

## 2024-04-02 RX ADMIN — INSULIN HUMAN 5 UNITS/HR: 1 INJECTION, SOLUTION INTRAVENOUS at 12:41

## 2024-04-02 RX ADMIN — ALBUMIN HUMAN 25 G: 0.25 SOLUTION INTRAVENOUS at 10:52

## 2024-04-02 RX ADMIN — HYDROCORTISONE SODIUM SUCCINATE 50 MG: 100 INJECTION, POWDER, FOR SOLUTION INTRAMUSCULAR; INTRAVENOUS at 15:40

## 2024-04-02 RX ADMIN — DEXMEDETOMIDINE HYDROCHLORIDE 0.2 MCG/KG/HR: 400 INJECTION INTRAVENOUS at 16:39

## 2024-04-02 RX ADMIN — MIDODRINE HYDROCHLORIDE 20 MG: 10 TABLET ORAL at 01:23

## 2024-04-02 RX ADMIN — HEPARIN SODIUM 1800 UNITS/HR: 10000 INJECTION, SOLUTION INTRAVENOUS at 20:42

## 2024-04-02 RX ADMIN — MIDODRINE HYDROCHLORIDE 20 MG: 10 TABLET ORAL at 10:53

## 2024-04-02 RX ADMIN — INSULIN HUMAN 3.5 UNITS/HR: 1 INJECTION, SOLUTION INTRAVENOUS at 22:38

## 2024-04-02 RX ADMIN — THIAMINE HYDROCHLORIDE 100 MG: 100 INJECTION, SOLUTION INTRAMUSCULAR; INTRAVENOUS at 13:19

## 2024-04-02 RX ADMIN — HYDROCORTISONE SODIUM SUCCINATE 50 MG: 100 INJECTION, POWDER, FOR SOLUTION INTRAMUSCULAR; INTRAVENOUS at 04:15

## 2024-04-02 RX ADMIN — INSULIN HUMAN 3.5 UNITS/HR: 1 INJECTION, SOLUTION INTRAVENOUS at 17:09

## 2024-04-02 ASSESSMENT — PAIN SCALES - GENERAL
PAINLEVEL_OUTOF10: 0 - NO PAIN
PAINLEVEL_OUTOF10: 0 - NO PAIN

## 2024-04-02 NOTE — PROGRESS NOTES
ENT Trach Check Note    Subjective:  No acute events overnight. The patient reached therapeutic Heparin levels on morning labs.  Around 10 AM, ENT was paged due to excessive bleeding around the trach site that soaked through multiple gauze pads.      Objective:  Visit Vitals  BP (!) 108/47   Pulse 82   Temp 36.3 °C (97.3 °F)   Resp (!) 27     General: Alert, oriented, no acute distress  Resp: Breathing comfortably on vent  Head: Atraumatic, normocephalic  Oral Cavity: dry oral cavity   Ears: external ears normal   Nose: external nose normal  Neck:  active oozing from inferior portion of stoma site (see procedure note), trach collar adjusted    Procedure note:  A Graham suction was used to obtain visualization within the trach stoma site.  Silver nitrate and cotton swabs were used to obtain hemostasis.  Afterwards, Surgicel was packed in the inferior portion of wound.    Assessment:  Jason s/p POD1 from open tracheostomy by Dr. Perkins on 4/1/24. Hemostasis at surgical site was obtained (see procedure note above). ENT re-checked his track site 1 hour after cauterization and surgicel packing. No evidence of bleeding at that time.     Plan:  - Continue standard trach care  - ENT to cut sutures 4/6/24  - Okay to restart DVT PPx and AC if medically indicated   - Please page with any questions or concerns     ENT  [id number]

## 2024-04-02 NOTE — CONSULTS
[facility]   [facility]  INITIAL CONSULT NOTE       Reason For Consult  hyperbilirubinema    SUBJECTIVE     History Of Present Illness  Jason Hollins is a 63 y.o. male with a past medical history of DM2, HLD, myxoid chondrosarcoma. He was admitted 3/5/24 for surgical resection and is now s/p  wide resection sarcoma, sciatic nerve neurolysis of right lower extremity with right gluteal reconstruction, pedicle ALT, vastus lateralus flap, pedicle gluteal and perisformis flap with Dr. Hawkins and Dr. Smith on 3/5/24. Post operative course c/b arrest requiring CPR with ROSC after TPA for assumed large PE on 3/20. Increased swelling at flap site appreciated overnight 3/20-3/21 and pt returned to OR for exploration of R flap site, evacuation of hematoma, suture ligation of multiple bleeding vessel sites in gluteal area and wound closure with Dr. Smith on 3/21. Hepatology is consulted for hyperbilirubinemia    Has had complicated hospital course post-operatively. Most notably, had cardiac arrest on 3/20 as noted above. ROSC obtained after TPA given for presumed large PE. He then developed hemorrhagic shock requiring MTP on 3/21 and was found to have larigth right thigh wound s/p OR for exploration and hematoma evacuation and suture ligation of multiple bleeding vessels. Required multiple pressors afterwards. Got days of zosyn post-arrest. This was complicated by anuric RUTH now requiring RRT (transitioning off CVVH to SLED today) and shock liver  (LFTs have been downtrending while bilirubin has been uptrending). He had tracheostomy placed yesterday. He has been off pressors since 3/31. Currently off antibotics    Patient having difficulty participating in conversation due to somnolence but able to answer questions with nods and headshakes. He denies prior liver problems. No family history of liver disease. No history of heavy alcohol use.     Review of Systems  Difficult to  obtain 2/2 mentation    Past Medical History:    Past Medical History:   Diagnosis Date    Diabetes mellitus (CMS/[facility])     Hyperlipidemia     Morbid (severe) obesity due to excess calories (CMS/[facility]) 2016    Severe obesity (BMI 35.0-39.9) with comorbidity       Home Medications  Medications Prior to Admission   Medication Sig Dispense Refill Last Dose    aspirin 81 mg EC tablet Take 1 tablet (81 mg) by mouth once daily. AS DIRECTED.   Past Week    blood sugar diagnostic (Blood Glucose Test) strip once daily. One Drop Test In Vitro Strip; Test   Past Week    chlorhexidine (Peridex) 0.12 % solution Use 15 mL in the mouth or throat see administration instructions for 2 doses. Use the night before and morning of surgery. 473 mL 0 3/5/2024    dulaglutide (Trulicity) 4.5 mg/0.5 mL pen injector Inject 4.5 mg under the skin 1 (one) time per week. 2 mL 2 2024    dulaglutide (TRULICITY) 4.5 mg/0.5 mL pen injector INJECT ONE PEN (4.5 MG) UNDER THE SKIN ONCE WEEKLY 2 mL 2 2024    ergocalciferol (Vitamin D-2) 1.25 MG (02806 UT) capsule Take 1 capsule (50,000 Units) by mouth every 14 (fourteen) days. twice monthly on the  and the  for vitamin d deficiency 6 capsule 1 2024    fish oil concentrate (Omega-3) 120-180 mg capsule Take 1 capsule (1 g) by mouth.   3/3/2024    gabapentin (Neurontin) 300 mg capsule Use the 100 mg capsule to titrate to 300 mg , then use the 300 mg capsule and take 1 to 2 capsules hs for pain 90 capsule 2 Past Month    ibuprofen 800 mg tablet Take 1 tablet (800 mg) by mouth 3 times a day as needed for mild pain (1 - 3) (pain). 180 tablet 3 Past Month    metFORMIN (Glucophage) 1,000 mg tablet TAKE 1 TABLET BY MOUTH EVERY MORNING AND 1.5 TABLETS BY MOUTH EVERY EVENING , 225 tablet 0 3/4/2024    multivitamin tablet Take 1 tablet by mouth once daily.   3/4/2024    atorvastatin (Lipitor) 40 mg tablet Take 1 tablet (40 mg) by mouth once daily. 90 tablet 1 3/3/2024 at pm    []  chlorhexidine (Hibiclens) 4 % external liquid Apply topically 2 times a day for 5 days. 473 mL 0     NIFEdipine ER (NIFEdipine XL) 30 mg 24 hr tablet Take 1 tablet (30 mg) by mouth once daily in the morning. Take before meals. Do not crush, chew, or split. (Patient not taking: Reported on 3/5/2024) 90 tablet 3 Not Taking at discontinued2/29    OneTouch Ultra Test strip Test blood sugar once daily (Patient not taking: Reported on 3/5/2024) 100 strip 3 Not Taking    sildenafil (Viagra) 100 mg tablet Take 1 tablet (100 mg) by mouth once daily as needed (1 hour before needed). (Patient not taking: Reported on 3/5/2024) 10 tablet 2 Not Taking    tiZANidine (Zanaflex) 4 mg capsule Take 1 capsule (4 mg) by mouth 3 times a day. (Patient not taking: Reported on 3/5/2024) 40 capsule 2 Not Taking at pt requests refill         Surgical History:    Past Surgical History:   Procedure Laterality Date    CT GUIDED PERCUTANEOUS BIOPSY MUSCLE  11/13/2023    CT GUIDED PERCUTANEOUS BIOPSY MUSCLE 11/13/2023 [facility] CT    INVASIVE VASCULAR PROCEDURE N/A 3/20/2024    Procedure: Pulmonary Angiogram;  Surgeon: Axel Wolfe MD;  Location: [place] [address] Cardiac Cath Lab;  Service: Cardiovascular;  Laterality: N/A;    OTHER SURGICAL HISTORY  05/31/2016    History Of Prior Surgery       Allergies:  No Known Allergies    Social History:    Social History     Socioeconomic History    Marital status:      Spouse name: Not on file    Number of children: Not on file    Years of education: Not on file    Highest education level: Not on file   Occupational History    Not on file   Tobacco Use    Smoking status: Never    Smokeless tobacco: Never   Vaping Use    Vaping Use: Never used   Substance and Sexual Activity    Alcohol use: Never    Drug use: Never    Sexual activity: Defer   Other Topics Concern    Not on file   Social History Narrative    Not on file     Social Determinants of Health     Financial Resource Strain: Low Risk  (3/5/2024)    Overall  Financial Resource Strain (CARDIA)     Difficulty of Paying Living Expenses: Not hard at all   Food Insecurity: Not on file   Transportation Needs: No Transportation Needs (3/5/2024)    PRAPARE - Transportation     Lack of Transportation (Medical): No     Lack of Transportation (Non-Medical): No   Physical Activity: Not on file   Stress: Not on file   Social Connections: Not on file   Intimate Partner Violence: Not on file   Housing Stability: Low Risk  (3/5/2024)    Housing Stability Vital Sign     Unable to Pay for Housing in the Last Year: No     Number of Places Lived in the Last Year: 1     Unstable Housing in the Last Year: No       Family History:    Family History   Problem Relation Name Age of Onset    Coronary artery disease Mother      Diabetes Mother      Hypertension Mother      Cancer Father      Diabetes Sister      Diabetes Brother      Diabetes Other Grandmother        EXAM     Vitals:    Vitals:    04/02/24 1100 04/02/24 1120 04/02/24 1200 04/02/24 1300   BP:       Pulse: 82  77 82   Resp: (!) 30 (!) 36 (!) 35 (!) 27   Temp:       TempSrc:       SpO2: 94%  93% 94%   Weight:       Height:         Failed to redirect to the Timeline version of the "[employer] SmartLink.    Intake/Output Summary (Last 24 hours) at 4/2/2024 1414  Last data filed at 4/2/2024 1300  Gross per 24 hour   Intake 1675.08 ml   Output 1413 ml   Net 262.08 ml         Physical Exam  General: well-nourished, no acute distress  HEENT: PERRLA, EOM intact, no scleral icterus, moist MM  Respiratory: CTA bilaterally, normal work of breathing  Cardiovascular: RRR, no murmurs/rubs/gallops  Abdomen: Soft, nontender, nondistended, bowel sounds present. No masses palpated  Extremities: no edema, no asterixis  Neuro: alert and oriented, CNII-XII grossly intact, moves all 4 extremities with no focal deficits    OBJECTIVE                                                                              Medications       Current Facility-Administered  Medications:     albumin human 25 % solution 25 g, 25 g, intravenous, q6h, Remy Peter APRN-CNP, Stopped at 04/02/24 1152    alteplase (Cathflo Activase) injection 2 mg, 2 mg, intra-catheter, PRN, Remy Peter APRN-CNP    calcium gluconate in NS IV 1 g, 1 g, intravenous, q6h PRN, Remy Peter APRN-CNP, Stopped at 03/31/24 0634    calcium gluconate in NS IV 2 g, 2 g, intravenous, q6h PRN, Remy Peter APRN-CNP, Last Rate: 100 mL/hr at 03/26/24 1458, 2 g at 03/26/24 1458    dextrose 50 % injection 25 g, 25 g, intravenous, q15 min PRN **OR** glucagon (Glucagen) injection 1 mg, 1 mg, intramuscular, q15 min PRN, Remy Peter APRN-CNP    esomeprazole (NexIUM) suspension 40 mg, 40 mg, nasogastric tube, Daily before breakfast, Remy Peter APRN-CNP, 40 mg at 04/02/24 1053    fludrocortisone (Florinef) tablet 0.1 mg, 0.1 mg, nasogastric tube, q12h, PHILLIP Alejo-CNP, 0.1 mg at 04/02/24 1318    [Held by provider] heparin 25,000 Units in dextrose 5% 250 mL (100 Units/mL) infusion (premix), 0-4,000 Units/hr, intravenous, Continuous, Remy Peter APRN-CNP, Stopped at 04/02/24 1013    hydrocortisone sod succ (PF) (Solu-CORTEF) injection 50 mg, 50 mg, intravenous, q12h, Remy Peter APRN-CNP, 50 mg at 04/02/24 0415    insulin regular 100 unit/100 mL (1 unit/mL) in 0.9 % NaCl infusion, 0-15 Units/hr, intravenous, Continuous, Remy Peter APRN-CNP, Last Rate: 5 mL/hr at 04/02/24 1241, 5 Units/hr at 04/02/24 1241    lactated Ringer's infusion, 5 mL/hr, intravenous, Continuous, PHILLIP Alejo-CNP, Last Rate: 5 mL/hr at 04/02/24 0600, 5 mL/hr at 04/02/24 0600    magnesium sulfate IV 2 g, 2 g, intravenous, q6h PRN, PHILLIP Alejo-CNP, Stopped at 03/24/24 1726    magnesium sulfate IV 4 g, 4 g, intravenous, q6h PRN, PHILLIP Alejo-CNP, Stopped at 03/21/24 0706    midodrine (Proamatine) tablet 20 mg, 20 mg, orogastric tube, q8h, Remy Peter,  APRN-CNP, 20 mg at 04/02/24 1053    oxygen (O2) therapy, , inhalation, Continuous PRN - O2/gases, Remy Peter APRN-CNP, Rate Verify at 04/02/24 0830    perflutren protein A microsphere (Optison) injection 0.5 mL, 0.5 mL, intravenous, Once in imaging, Gracie Skaggs APRN-CNP    sulfur hexafluoride microsphr (Lumason) injection 24.28 mg, 2 mL, intravenous, Once in imaging, Gracie Skaggs APRN-CNP    thiamine (Vitamin B1) injection 100 mg, 100 mg, intravenous, Daily, Remy Peter APRN-CNP, 100 mg at 04/02/24 1319                                                                            Labs     Lab Results   Component Value Date     (H) 04/03/2024     (H) 04/02/2024     (H) 04/01/2024     (H) 03/29/2024     (H) 03/28/2024     (H) 03/27/2024    ALT 1,152 (H) 03/26/2024    ALT 1,524 (H) 03/25/2024    ALT 1,714 (H) 03/24/2024    ALT 1,826 (H) 03/23/2024    ALT 1,618 (H) 03/22/2024     (H) 04/03/2024     (H) 04/02/2024     (H) 04/01/2024     (H) 03/29/2024     (H) 03/28/2024     (H) 03/27/2024     (H) 03/26/2024    AST 1,253 (H) 03/25/2024    AST 1,550 (H) 03/24/2024    AST 2,289 (H) 03/23/2024    AST 3,786 (H) 03/22/2024    ALKPHOS 202 (H) 04/03/2024    ALKPHOS 167 (H) 04/02/2024    ALKPHOS 139 (H) 04/01/2024    ALKPHOS 116 03/29/2024    ALKPHOS 124 03/28/2024    ALKPHOS 101 03/27/2024    ALKPHOS 111 03/26/2024    ALKPHOS 123 03/25/2024    BILITOT 23.5 (H) 04/03/2024    BILITOT 20.1 (H) 04/02/2024    BILITOT 19.0 (H) 04/01/2024    BILITOT 16.2 (H) 03/29/2024    BILITOT 14.6 (H) 03/28/2024    BILITOT 12.9 (H) 03/27/2024    BILITOT 10.6 (H) 03/26/2024    BILITOT 8.8 (H) 03/25/2024    BILITOT 5.7 (H) 03/24/2024    BILITOT 4.9 (H) 03/23/2024    BILITOT 4.2 (H) 03/22/2024    HEPCAB Nonreactive 03/22/2024    HEPBSAG Nonreactive 03/22/2024    HEPBSAB 141.6 (H) 03/22/2024    BRET NEGATIVE 06/01/2022                                                                               Imaging           4/1/24 US RUQ  IMPRESSION:  1. Nonspecific gallbladder wall edema and thickening may relate to  generalized fluid overload. No cholelithiasis or gallbladder  distention.  2. Moderate-sized pleural effusion and trace abdominal ascites.  3. Mild hepatomegaly with heterogenous echotexture and slightly  nodular contour, please correlate with concern for steatosis and  fibrosis.  4. Irregular hypoechoic region adjacent to the hepatic veins prior to  their confluence with the IVC, of uncertain significance. Consider  MRI for further characterization if clinical concern persists.    3/22/24 CT chest abd pelvis  LIVER: Within the limitations of the study, the liver is normal in size with no evidence of focal lesions.   BILE DUCTS: The intrahepatic and extrahepatic ducts are not dilated.   GALLBLADDER:  The gallbladder is nondistended and without evidence of radiopaque stones.     3/22/24 US abdomen:   LIVER: The liver measures 17.4 cm and is grossly unremarkable and free of any focal lesions.  GALLBLADDER: Gallbladder is nondistended without evidence of gallstone. Gallbladder wall is thickened, measuring 0.6 cm. No pericholecystic fluid. Sonographic Sanchez sign unable to be assessed due to patient intubation.                                                                         GI Procedures     No prior       ASSESSMENT / PLAN                  ASSESSMENT/PLAN:  Jason Hollins is a 63 y.o. male with a past medical history of DM2, HLD, myxoid chondrosarcoma. He was admitted 3/5/24 for surgical resection and is now s/p  wide resection sarcoma, sciatic nerve neurolysis of right lower extremity with right gluteal reconstruction, pedicle ALT, vastus lateralus flap, pedicle gluteal and perisformis flap with Dr. Hawkins and Dr. Smith on 3/5/24. Post operative course c/b arrest requiring CPR with ROSC after TPA for assumed large PE on 3/20. Increased  swelling at flap site appreciated overnight 3/20-3/21 and pt returned to OR for exploration of R flap site, evacuation of hematoma, suture ligation of multiple bleeding vessel sites in gluteal area and wound closure with Dr. Smith on 3/21. Hepatology is consulted for hyperbilirubinemia    He had known ischemic event on 3/20 (cardiac arrest) with resultant ischemic liver injury/shock liver when LFTs were in the thousands. Lfts have been downtrending steadily since then. Bilirubin has continued to uptrend.  Bilirubin often lags behind the ischemic insult and is last to increase and last to peak. I suspect a component of his hyperbilirubinemia is lagging behind the ischemic insult. He likely also has a component of ICU jaundice related to critical illness on top of this. Would obtain US liver with dopplers to assess blood flow and rule out large vessel thrombosis in the hepatic vasculature. Does not appear to have biliary obstruction (even though s/p cholecystectomy) based of RUQ US that was done. Noted TTE results with possible reduced RV function -- congestive hepatopathy could contribute to his current LFT elevations and hyperbilirubinemia as well    Recommendations:  -Please obtain US liver with dopplers  -Continue to trend hepatic function panel daily  -Continue supportive care per primary team    Patient was seen and discussed with Dr. Mario    Thank you for the consultation. Hepatology will continue to the follow.  - During weekday hours of 7am- 5pm, please do not hesitate to contact me on [employer] Chat or page [phone] if there are any further questions   - After hours, on weekends, and on holidays, please [place] [phone]. Thank you.       Jill Rubio MD  PGY4 Gastroenterology Fellow  [facility]

## 2024-04-02 NOTE — PROGRESS NOTES
Jason Hollins is a 63 y.o. male on day 28 of admission presenting with Soft tissue sarcoma (CMS/HCC).    Subjective   Marginal hypotension when resuming CVVH s/p trach. Treated with 250ml Albumin 5% and 1u PRBC (Hgb 7.8 without appropriate incrementation to 7.9). Patient with some bleeding from trach site this morning. Heparin infusion held and ENT re-dressed site.    Objective     Physical Exam  Vitals reviewed.   Constitutional:       Appearance: He is ill-appearing.      Comments: Trached.   HENT:      Head:      Comments: Edematous face/head/neck     Nose:      Comments: Dobhoff in R nare bridled in place at 65cm.     Mouth/Throat:      Mouth: Mucous membranes are dry.   Eyes:      General: Scleral icterus present.      Pupils: Pupils are equal, round, and reactive to light.      Comments: Conjunctival edema. Subconjunctival hemorrhage.   Neck:      Comments: Trach midline. 6-0 proximal Shiley XLT, dressing with bloody drainage. RIJ trialysis line in place, dressing c/d/i.   Cardiovascular:      Rate and Rhythm: Normal rate and regular rhythm.      Pulses:           Radial pulses are 1+ on the right side and 1+ on the left side.        Dorsalis pedis pulses are 1+ on the right side and 1+ on the left side.      Heart sounds: Normal heart sounds.   Pulmonary:      Comments: Mechanically ventilated via trach. Diminished breath sounds bilaterally. Equal chest rise. Current vent settings SIMV 50%, 550, 18, +10, 5 PS.  Abdominal:      General: Abdomen is protuberant. Bowel sounds are normal.      Palpations: Abdomen is soft.      Comments: Obese abdomen.   Genitourinary:     Comments: Dignicare in place.  Musculoskeletal:      Cervical back: Neck supple.      Comments: Generalized weakness.   Skin:     General: Skin is warm and dry.      Coloration: Skin is jaundiced.      Comments: Anasarca. Right flap site with wound VAC, no output. ANDERSON x 3 all with serosanguinous output.   Neurological:      GCS: GCS eye subscore is 4.  "GCS verbal subscore is 1. GCS motor subscore is 6.      Comments: Trached. Patient opening eyes spontaneously and following simple commands. Weak bilateral hand grasps and wiggles toes in left foot. Unable to move RLE.      Psychiatric:      Comments: HANNAH.     Last Recorded Vitals  Blood pressure (!) 108/47, pulse 86, temperature 36.5 °C (97.7 °F), temperature source Temporal, resp. rate 20, height 1.778 m (5' 10\"), weight 125 kg (275 lb 5.7 oz), SpO2 92 %.    VS over last 24h  Heart Rate:  [64-92]   Temp:  [36 °C (96.8 °F)-36.5 °C (97.7 °F)]   Resp:  [18-36]   BP: ()/(41-47)   SpO2:  [92 %-99 %]      Scheduled medications  albumin human, 25 g, intravenous, q6h  esomeprazole, 40 mg, nasogastric tube, Daily before breakfast  fludrocortisone, 0.1 mg, nasogastric tube, q12h  hydrocortisone sodium succinate, 50 mg, intravenous, q12h  midodrine, 20 mg, orogastric tube, q8h  perflutren protein A microsphere, 0.5 mL, intravenous, Once in imaging  sulfur hexafluoride microsphr, 2 mL, intravenous, Once in imaging  thiamine, 100 mg, intravenous, Daily      Continuous medications  [Held by provider] heparin, 0-4,000 Units/hr, Last Rate: Stopped (04/02/24 1013)  insulin regular infusion for cardiac surgery, 0-15 Units/hr, Last Rate: 6.5 Units/hr (04/02/24 1416)  lactated Ringer's, 5 mL/hr, Last Rate: 5 mL/hr (04/02/24 0600)      PRN medications  PRN medications: alteplase, calcium gluconate, calcium gluconate, dextrose **OR** glucagon, magnesium sulfate, magnesium sulfate, oxygen      Results for orders placed or performed during the hospital encounter of 03/05/24 (from the past 24 hour(s))   POCT GLUCOSE   Result Value Ref Range    POCT Glucose 172 (H) 74 - 99 mg/dL   Fibrinogen   Result Value Ref Range    Fibrinogen 163 (L) 200 - 400 mg/dL   Calcium, ionized   Result Value Ref Range    POCT Calcium, Ionized 1.18 1.1 - 1.33 mmol/L   Renal Function Panel   Result Value Ref Range    Glucose 181 (H) 74 - 99 mg/dL    Sodium " 134 (L) 136 - 145 mmol/L    Potassium 4.7 3.5 - 5.3 mmol/L    Chloride 99 98 - 107 mmol/L    Bicarbonate 22 21 - 32 mmol/L    Anion Gap 18 10 - 20 mmol/L    Urea Nitrogen 60 (H) 6 - 23 mg/dL    Creatinine 1.85 (H) 0.50 - 1.30 mg/dL    eGFR 41 (L) >60 mL/min/1.73m*2    Calcium 8.5 (L) 8.6 - 10.6 mg/dL    Phosphorus 3.2 2.5 - 4.9 mg/dL    Albumin 3.2 (L) 3.4 - 5.0 g/dL   Magnesium   Result Value Ref Range    Magnesium 2.35 1.60 - 2.40 mg/dL   CBC   Result Value Ref Range    WBC 10.4 4.4 - 11.3 x10*3/uL    nRBC 0.9 (H) 0.0 - 0.0 /100 WBCs    RBC 2.65 (L) 4.50 - 5.90 x10*6/uL    Hemoglobin 7.7 (L) 13.5 - 17.5 g/dL    Hematocrit 23.1 (L) 41.0 - 52.0 %    MCV 87 80 - 100 fL    MCH 29.1 26.0 - 34.0 pg    MCHC 33.3 32.0 - 36.0 g/dL    RDW 21.5 (H) 11.5 - 14.5 %    Platelets 50 (L) 150 - 450 x10*3/uL   CBC   Result Value Ref Range    WBC 11.0 4.4 - 11.3 x10*3/uL    nRBC 1.2 (H) 0.0 - 0.0 /100 WBCs    RBC 2.73 (L) 4.50 - 5.90 x10*6/uL    Hemoglobin 7.8 (L) 13.5 - 17.5 g/dL    Hematocrit 23.7 (L) 41.0 - 52.0 %    MCV 87 80 - 100 fL    MCH 28.6 26.0 - 34.0 pg    MCHC 32.9 32.0 - 36.0 g/dL    RDW 21.6 (H) 11.5 - 14.5 %    Platelets 58 (L) 150 - 450 x10*3/uL   Blood Gas Arterial Full Panel   Result Value Ref Range    POCT pH, Arterial 7.37 (L) 7.38 - 7.42 pH    POCT pCO2, Arterial 39 38 - 42 mm Hg    POCT pO2, Arterial 76 (L) 85 - 95 mm Hg    POCT SO2, Arterial 97 94 - 100 %    POCT Oxy Hemoglobin, Arterial 94.1 94.0 - 98.0 %    POCT Hematocrit Calculated, Arterial 25.0 (L) 41.0 - 52.0 %    POCT Sodium, Arterial 131 (L) 136 - 145 mmol/L    POCT Potassium, Arterial 5.1 3.5 - 5.3 mmol/L    POCT Chloride, Arterial 101 98 - 107 mmol/L    POCT Ionized Calcium, Arterial 1.20 1.10 - 1.33 mmol/L    POCT Glucose, Arterial 186 (H) 74 - 99 mg/dL    POCT Lactate, Arterial 1.7 0.4 - 2.0 mmol/L    POCT Base Excess, Arterial -2.6 (L) -2.0 - 3.0 mmol/L    POCT HCO3 Calculated, Arterial 22.5 22.0 - 26.0 mmol/L    POCT Hemoglobin, Arterial 8.3 (L)  13.5 - 17.5 g/dL    POCT Anion Gap, Arterial 13 10 - 25 mmo/L    Patient Temperature 37.0 degrees Celsius    FiO2 50 %   POCT GLUCOSE   Result Value Ref Range    POCT Glucose 205 (H) 74 - 99 mg/dL   Coagulation Screen   Result Value Ref Range    Protime 24.5 (H) 9.8 - 12.8 seconds    INR 2.2 (H) 0.9 - 1.1    aPTT 59 (H) 27 - 38 seconds   CBC   Result Value Ref Range    WBC 9.3 4.4 - 11.3 x10*3/uL    nRBC 0.5 (H) 0.0 - 0.0 /100 WBCs    RBC 2.67 (L) 4.50 - 5.90 x10*6/uL    Hemoglobin 7.9 (L) 13.5 - 17.5 g/dL    Hematocrit 22.2 (L) 41.0 - 52.0 %    MCV 83 80 - 100 fL    MCH 29.6 26.0 - 34.0 pg    MCHC 35.6 32.0 - 36.0 g/dL    RDW 19.8 (H) 11.5 - 14.5 %    Platelets 55 (L) 150 - 450 x10*3/uL   Heparin Assay, UFH   Result Value Ref Range    Heparin Unfractionated 0.2 See Comment Below for Therapeutic Ranges IU/mL   Magnesium   Result Value Ref Range    Magnesium 2.31 1.60 - 2.40 mg/dL   Renal Function Panel   Result Value Ref Range    Glucose 210 (H) 74 - 99 mg/dL    Sodium 134 (L) 136 - 145 mmol/L    Potassium 4.7 3.5 - 5.3 mmol/L    Chloride 99 98 - 107 mmol/L    Bicarbonate 24 21 - 32 mmol/L    Anion Gap 16 10 - 20 mmol/L    Urea Nitrogen 67 (H) 6 - 23 mg/dL    Creatinine 2.10 (H) 0.50 - 1.30 mg/dL    eGFR 35 (L) >60 mL/min/1.73m*2    Calcium 8.7 8.6 - 10.6 mg/dL    Phosphorus 3.3 2.5 - 4.9 mg/dL    Albumin 3.1 (L) 3.4 - 5.0 g/dL   Calcium, ionized   Result Value Ref Range    POCT Calcium, Ionized 1.15 1.1 - 1.33 mmol/L   Fibrinogen   Result Value Ref Range    Fibrinogen 176 (L) 200 - 400 mg/dL   Hepatic function panel   Result Value Ref Range    Albumin 3.1 (L) 3.4 - 5.0 g/dL    Bilirubin, Total 20.1 (H) 0.0 - 1.2 mg/dL    Bilirubin, Direct 14.4 (H) 0.0 - 0.3 mg/dL    Alkaline Phosphatase 167 (H) 33 - 136 U/L     (H) 10 - 52 U/L     (H) 9 - 39 U/L    Total Protein 5.2 (L) 6.4 - 8.2 g/dL   Blood Gas Arterial Full Panel   Result Value Ref Range    POCT pH, Arterial 7.43 (H) 7.38 - 7.42 pH    POCT pCO2,  Arterial 33 (L) 38 - 42 mm Hg    POCT pO2, Arterial 119 (H) 85 - 95 mm Hg    POCT SO2, Arterial 100 94 - 100 %    POCT Oxy Hemoglobin, Arterial 96.3 94.0 - 98.0 %    POCT Hematocrit Calculated, Arterial 25.0 (L) 41.0 - 52.0 %    POCT Sodium, Arterial 130 (L) 136 - 145 mmol/L    POCT Potassium, Arterial 4.7 3.5 - 5.3 mmol/L    POCT Chloride, Arterial 101 98 - 107 mmol/L    POCT Ionized Calcium, Arterial 1.15 1.10 - 1.33 mmol/L    POCT Glucose, Arterial 217 (H) 74 - 99 mg/dL    POCT Lactate, Arterial 1.5 0.4 - 2.0 mmol/L    POCT Base Excess, Arterial -2.0 -2.0 - 3.0 mmol/L    POCT HCO3 Calculated, Arterial 21.9 (L) 22.0 - 26.0 mmol/L    POCT Hemoglobin, Arterial 8.4 (L) 13.5 - 17.5 g/dL    POCT Anion Gap, Arterial 12 10 - 25 mmo/L    Patient Temperature 37.0 degrees Celsius    FiO2 50 %   CBC   Result Value Ref Range    WBC 9.5 4.4 - 11.3 x10*3/uL    nRBC 0.6 (H) 0.0 - 0.0 /100 WBCs    RBC 2.69 (L) 4.50 - 5.90 x10*6/uL    Hemoglobin 7.9 (L) 13.5 - 17.5 g/dL    Hematocrit 23.5 (L) 41.0 - 52.0 %    MCV 87 80 - 100 fL    MCH 29.4 26.0 - 34.0 pg    MCHC 33.6 32.0 - 36.0 g/dL    RDW 20.9 (H) 11.5 - 14.5 %    Platelets 58 (L) 150 - 450 x10*3/uL   POCT GLUCOSE   Result Value Ref Range    POCT Glucose 215 (H) 74 - 99 mg/dL   POCT GLUCOSE   Result Value Ref Range    POCT Glucose 173 (H) 74 - 99 mg/dL   POCT GLUCOSE   Result Value Ref Range    POCT Glucose 172 (H) 74 - 99 mg/dL   Coagulation Screen   Result Value Ref Range    Protime 24.6 (H) 9.8 - 12.8 seconds    INR 2.2 (H) 0.9 - 1.1    aPTT 93 (H) 27 - 38 seconds   Heparin Assay, UFH   Result Value Ref Range    Heparin Unfractionated 0.4 See Comment Below for Therapeutic Ranges IU/mL   POCT GLUCOSE   Result Value Ref Range    POCT Glucose 200 (H) 74 - 99 mg/dL   POCT GLUCOSE   Result Value Ref Range    POCT Glucose 210 (H) 74 - 99 mg/dL   Transthoracic Echo (TTE) Limited   Result Value Ref Range    LVOT diam 2.30 cm    LV Biplane EF 69 %    LA vol index A/L 28.0 ml/m2     LVIDd 5.20 cm    LV A4C EF 65.5     BSA 2.48 m2   POCT GLUCOSE   Result Value Ref Range    POCT Glucose 198 (H) 74 - 99 mg/dL   POCT GLUCOSE   Result Value Ref Range    POCT Glucose 202 (H) 74 - 99 mg/dL   POCT GLUCOSE   Result Value Ref Range    POCT Glucose 207 (H) 74 - 99 mg/dL   CBC   Result Value Ref Range    WBC 8.1 4.4 - 11.3 x10*3/uL    nRBC 0.6 (H) 0.0 - 0.0 /100 WBCs    RBC 2.73 (L) 4.50 - 5.90 x10*6/uL    Hemoglobin 8.1 (L) 13.5 - 17.5 g/dL    Hematocrit 22.6 (L) 41.0 - 52.0 %    MCV 83 80 - 100 fL    MCH 29.7 26.0 - 34.0 pg    MCHC 35.8 32.0 - 36.0 g/dL    RDW 20.4 (H) 11.5 - 14.5 %    Platelets 68 (L) 150 - 450 x10*3/uL   Coagulation Screen   Result Value Ref Range    Protime 24.2 (H) 9.8 - 12.8 seconds    INR 2.1 (H) 0.9 - 1.1    aPTT 41 (H) 27 - 38 seconds   POCT GLUCOSE   Result Value Ref Range    POCT Glucose 194 (H) 74 - 99 mg/dL   POCT GLUCOSE   Result Value Ref Range    POCT Glucose 181 (H) 74 - 99 mg/dL        Assessment/Plan   Jason Hollins is a 63 y.o. 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 Hawkins and Dr. Smith on 3/5/24.  Post operative course was uncomplicated and patient was on RNF until 3/20 for prolonged bed rest to avoid hip flexion to maintain flap integrity.  Patient was on prophylactic lovenox while on bedrest.     3/20: Cardiopulmonary arrest s/p CPR with ROSC after TPA, overnight started on heparin, epo, increased pressor requirements, increased swelling at flap site.     3/21: Hemorrhagic shock, MTP. Firm and large right flap site. Return to OR s/p Exploration of right thigh wound, Evacuation of hematoma. Suture ligation of multiple bleeding vessel and several points in gluteal area.     4/1: s/p Trach    Plan:  NEURO: History of myxoid chondrosarcoma s/p wide resection sarcoma,  sciatic nerve neurolysis of right lower extremity with right gluteal reconstruction, pedicle ALT, vastus lateralus flap, pedicle gluteal and perisformis flap with Dr. Hawkins and Dr. Smith on 3/5/24 s/p cardiopulmonary arrest with ROSC 3/20. Documented to have followed commands off sedation. CT head 3/22 without acute process. Weaned off cisatracurium and propofol overnight 3/23-3/24. Ketamine weaned off 3/25 and Fentanyl weaned off 3/26 am. Repeat CT Head 3/26 without acute process. MRI Brain 3/30, negative for cerebral infarction or hemorrhage. Neuro exam - Patient opening eyes to verbal stimulation and following simple commands. Weak bilateral hand grasps and wiggles toes in left foot. Unable to move RLE. Off all sedation.  - ongoing neuro and pain assessments  - avoid CNS depressants as able  - PT/OT -> PROM  - no need for soft wrist restraints at this time -> continue to reassess     CV: History of HTN, HLD. Acute cardiopulmonary arrest 2/2 to possible massive PE vs massive STEMI, received multiple rounds of CPR and one defibrillation.  Sustained ROSC achieved after TPA bolus. On high dose levophed, epinephrine, vaso, angioT2. Plan on 3/21 was for ECMO which was aborted secondary to large hemhorrge at right flap site. Had MTP and taken OR with 5L evacuated. ECHO 3/21: Normal LV, Mod enlarged RV, slight suggestion of a Townsend's sign / RV strain, low normal RV function. ECHO 3/22 with hyperdynamic LV. New onset Afib with RVR overnight 3/22-3/23, dosed with 150mg amio x2, started infusion at 1mg/min thereafter. AT2 weaned off. Amio infusion discontinued 3/25. Lactate downtrending. Vaso and epi weaned off. Remains in NSR. iEpo weaned off 3/27. Repeat TTE 3/29 essentially unchanged. Remains off levo.  - f/u repeat TTE -> ongoing evidence of RV overload  - continuous EKG/abp/cvp monitoring  - Goal map range 65-90  - Continue midodrine 20mg q8h  - Continue florinef 0.1mg BID   - Continue SDS with hydrocortisone  50mg q12h  - add Albumin 25% q6h x4 doses  - Hold heparin infusion for now given bleeding from trach site this am     PULM: Arrived to SICU intubated julio c-CPR. Concern for massive PE. Started on iEpo, currently on 0.05. Hypoxic respiratory failure. Severe ARDS. ETT exchanged 3/23. CT Chest 3/26 with interval JOHN consolidative/ground glass opacity. S/p Tracheostomy on 4/1. Currently on SIMV 50%, 550, 18, +10, 5 PS.   - ARDSnet lung protective strategies  - Wean FiO2 as tolerated  - ABGs prn  - additional pulm toilet prn  - daily CXR    ENT: Had epistaxis 3/23, completed afrin bid x3 days. Bleeding from trach site this am, ENT re-dressed site.  - monitor, low threshold to consult ENT  - avoid NG placement for now     GI: NPO. Shock liver, pancreatitis. Elevated TG. Elevated lactate. Consulted ACS for potential bowel ischemia as cause of persistent lactic acidosis. CT imaging 3/22 with evidence of pancreatitis. No acute surgical indication per ACS. RUQ US 3/22 with thickening of GB wall without cholelithiasis. CT A/P 3/26 negative for acute bleed. LFTs downtrending. Worsening hyperbilirubinemia. Amylase and lipase now WNL. Repeat RUQ US 4/1 with nonspecific gallbladder wall edema and thickening which may be 2/2 generalized fluid overload, mild hepatomegaly with slight nodular contour and c/f steatosis and fibrosis, irregular hypoechoic region adjacent to hepatic veins.  - consult Hepatology, appreciate recs  - increase TFs to goal 45ml/hr  - prostat daily  - PPI for GI ppx  - Trend LFTs daily  - IV thiamine 100mg IV x7 days     : No history of renal disease. Baseline creatinine 0.6. Anuric RUTH. Elevated CK, downtrending. Metabolic acidosis, total body fluid overload, hyperkalemia. Started on CVVH 3/21, stopped bicarb infusion 3/22. Marino removed 3/24. Hyponatremia resolved. Net negative 126ml for past 24hrs.  - Nephrology following, appreciate recs -> discontinuing CVVH and switching to SLED  - Fluid goal negative  3L  - RFP BID and PRN  - Replete electrolytes judiciously  - Daily bladder scan     HEME: Patient was on ppx lovenox for DVT prophylaxis prior to cardiopulmonary arrest. Concern for massive PE patient received bolus of TPA during CPR. Started on heparin infusion overnight given concern for PE. Hemorrhagic shock 3/21, MTP and back to OR s/p Exploration of right thigh woundEvacuation of hematoma. Suture ligation of multiple bleeding vessel and several points in gluteal area. Hematology consulted 3/22 to r/o HLH. Transfused 1 RBC overnight 3/22-3/23. Thrombocytopenia, coagulapathy, hypofibrinogenemia. LE Duplex 3/25 +acute occlusive R soleal DVT. Received 2u PRBC and 1u FFP on 3/26. Heparin infusion discontinued 3/26 to r/o HIT and transitioned to bival. PF4 negative, resumed heparin infuision 3/27. Elevated IL2R and decreased NK function. C/f HLH (low suspicion), empirically treated with decadron (3/28-3/31). Thrombocytopenia on 3/30 to 18k, received 5pk, did not increment. Heparin held. Thrombocytopenia likely 2/2 to consumptive process, hematology following. Received IVIG and 5pk plts 3/31. Plt count 58k this am.  - cbc, coags bid and prn  - fibrinogen daily  - Hold heparin infusion for now given bleeding from trach site this am  - Hematology following, appreciate recs -> maintain Plt count >35k  - ongoing monitoring for s/s bleeding  - close surveillance of RLE and drains  - Vas Med signed off 3/27  - maintain active T&S (3/31)    ENDO: History of DM2. A1c 7.5%. TSH WNL 1/2024.   - wean insulin infusion as able   - q1h accuchecks per ICU protocol     ID: Afebrile. Leukocytosis. On broad antimicrobial therapy. MRSA screen + 2/28/24. Pan cultures sent 3/22. Sputum NTF, +MRSA screen (completed 5 day course mupirocin), UCx and BCx negative. Completed 7 day course vanc/zosyn 3/27.   - temp q4h, wbc daily  - ongoing monitoring for s/s infection    Lines:  - right brachial arterial line (3/20)  - RIJ trialysis (3/27)  -  left subclavian MAC with mini MAC (3/20)  - PIV x1     I have discussed the case with the resident/advanced practice provider. I have personally performed a history, physical exam, and my own medical decision making. I have reviewed the note and agree with the findings and plan with the following exceptions as identified below. I have personally provided 36 minutes of critical care time exclusive of time spent on separately billable procedures. Time includes review of laboratory data, radiology results, discussion with consultants, family and monitoring for potential decompensation. Interventions were performed as documented above.      Family/Surrogate updated with plan of care.  Code status addressed/up to date.     Alan Xiong MD  SICU phone [phone]

## 2024-04-02 NOTE — PROGRESS NOTES
"04/02 REVIEW   Same as below. 4/1 TRACHE. Nephro still following for CVVH d/t RUTH. Follow for whether pt. To dc on dialysis in which IR only puts in HD tunneled line 1-2 days before certain dc date. Wound Vac RLE. DISPO - will most likely change dispo to LTACH (auth needed with [employer]). Awaiting for team to discuss tentative dc date & LTACH planning with wife, Lakisha & family.     03/29 REVEW  Same as below but plan for trache 4/1 which will most likely change dispo to LTACH (auth needed with [employer]).      03/28 REVIEW   Continues requiring ICU loc - still intubated (7d), on CVVH, Palliative Oncology consulted 3/24 for soft tissue sarcoma & family support. 3/25 rt. Soleal DVT (C: Vasc Sx) Not clear if dc plan will remain for SNF, [facility] or [facility] in which case need to re-connect with closer to dc and subsequently need [employer] auth.       03/25 REVIEW  Continues requiring ICU loc - still intubated (5d), on CVVH, and now Palliative Oncology consulted 3/24 for family support. Not clear if dc plan will remain for [facility], [facility] or [facility].          03/22 1045  Services for patient keep changing now entirely on Plastics (CTICU) and per today's Plastic Surgery -- \"Remains critically ill in CTICU,  CRRT initiated overnight. Remains intubated/sedated with levo, vaso, epi, angiotensin II running Overnight Events Dialysis line placed, CVVHD initiated. Reviewed above notes copied to this progress note.... +added updated clinicals in [facility] and provided SNFs - [facility] update. To continue to follow and if SNF still dc plan, obtain foc from patient/family when appropritate.      Chantelle Ramirez (LSW, MSW)     "

## 2024-04-02 NOTE — PROGRESS NOTES
Jason Hollins is a 63 y.o. male on day 28 of admission presenting with Soft tissue sarcoma (CMS/HCC).      Subjective   4/2: Had trach yesterday. Seen intubated. BP stable - not on vasopressor. CVVH running.       Objective          Vitals 24HR  Heart Rate:  [64-92]   Temp:  [36 °C (96.8 °F)-36.5 °C (97.7 °F)]   Resp:  [18-36]   BP: ()/(41-47)   SpO2:  [92 %-99 %]         Intake/Output last 3 Shifts:    Intake/Output Summary (Last 24 hours) at 4/2/2024 1413  Last data filed at 4/2/2024 1300  Gross per 24 hour   Intake 1675.08 ml   Output 1413 ml   Net 262.08 ml         Physical Exam  General appearance: intubated, trached  Eyes: non-icteric  Skin: no apparent rash  Heart: RRR  Lungs: good airflow throughout,FiO2 50% on vent  Abdomen: soft, non-distended   Extremities: 1+ B/l leg edema  : daniels in place   Neuro: follows commands by squeezing hand  ACCESS: -  right I J trialysis    Current Facility-Administered Medications:     albumin human 25 % solution 25 g, 25 g, intravenous, q6h, PHILLIP Alejo-CNP, Stopped at 04/02/24 1152    alteplase (Cathflo Activase) injection 2 mg, 2 mg, intra-catheter, PRN, Remy Peter APRN-CNP    calcium gluconate in NS IV 1 g, 1 g, intravenous, q6h PRN, Remy Pteer APRN-CNP, Stopped at 03/31/24 0634    calcium gluconate in NS IV 2 g, 2 g, intravenous, q6h PRN, Remy Peter APRN-CNP, Last Rate: 100 mL/hr at 03/26/24 1458, 2 g at 03/26/24 1458    dextrose 50 % injection 25 g, 25 g, intravenous, q15 min PRN **OR** glucagon (Glucagen) injection 1 mg, 1 mg, intramuscular, q15 min PRN, Remy Peter APRN-CNP    esomeprazole (NexIUM) suspension 40 mg, 40 mg, nasogastric tube, Daily before breakfast, PHILLIP Alejo-CNP, 40 mg at 04/02/24 1053    fludrocortisone (Florinef) tablet 0.1 mg, 0.1 mg, nasogastric tube, q12h, PHILLIP Alejo-CNP, 0.1 mg at 04/02/24 1318    [Held by provider] heparin 25,000 Units in dextrose 5% 250 mL (100 Units/mL)  infusion (premix), 0-4,000 Units/hr, intravenous, Continuous, PHILLIP Alejo-CNP, Stopped at 04/02/24 1013    hydrocortisone sod succ (PF) (Solu-CORTEF) injection 50 mg, 50 mg, intravenous, q12h, Remy Peter APRN-CNP, 50 mg at 04/02/24 0415    insulin regular 100 unit/100 mL (1 unit/mL) in 0.9 % NaCl infusion, 0-15 Units/hr, intravenous, Continuous, PHILLIP Alejo-CNP, Last Rate: 5 mL/hr at 04/02/24 1241, 5 Units/hr at 04/02/24 1241    lactated Ringer's infusion, 5 mL/hr, intravenous, Continuous, PHILLIP Alejo-CNP, Last Rate: 5 mL/hr at 04/02/24 0600, 5 mL/hr at 04/02/24 0600    magnesium sulfate IV 2 g, 2 g, intravenous, q6h PRN, PHILLIP Alejo-CNP, Stopped at 03/24/24 1726    magnesium sulfate IV 4 g, 4 g, intravenous, q6h PRN, Remy Peter APRN-CNP, Stopped at 03/21/24 0706    midodrine (Proamatine) tablet 20 mg, 20 mg, orogastric tube, q8h, PHILLIP Alejo-CNP, 20 mg at 04/02/24 1053    oxygen (O2) therapy, , inhalation, Continuous PRN - O2/gases, PHILLIP Alejo-CNP, Rate Verify at 04/02/24 0830    perflutren protein A microsphere (Optison) injection 0.5 mL, 0.5 mL, intravenous, Once in imaging, RAQUEL Bernal    sulfur hexafluoride microsphr (Lumason) injection 24.28 mg, 2 mL, intravenous, Once in imaging, PHILLIP Bernal-CNP    thiamine (Vitamin B1) injection 100 mg, 100 mg, intravenous, Daily, PHILLIP Alejo-CNP, 100 mg at 04/02/24 1319      Assessment/Plan   Jason Hollins is a 63 y.o. 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 Hawkins and Dr. Smith on 3/5/24. On 3/20, he developed massive PE and was given TPA, taken to OR in setting of increased swelling at flap site- hematoma for exploration and evacuation.  Nephrology following for RUTH.    Acute Kidney Injury on Dialysis (RUTH-D)  - etiology hemodynamic from hemorrhagic shock  - on CVVH since 3/21 via R I J trialysis  - Cr baseline 0.6  - electrolytes: K 4.7, HCO3 24  - anuric; 24h I/Os net - 0.1L  - no vasopressor - off levo since 3/31   - FiO2 50% on vent  - CVVH with 2k bath    Myxoid Chondrosarcoma s/p wide resection 3/5/2024  Thrombocytopenia  - s/p IVIG on 3/31 as per Oncology    Plan  - will stop CVVH as hemodynamically stable  - ordered SLED for today with goal 3L fluid removal to aid in volume management  - bladder scan once per shift to monitor for renal recovery  - daily BMP - make sure labs checked >4h after end of SLED so that it is accurate due to electrolyte shift    D/w Dr. Yolanda Duffy MD  Nephrology Fellow PGY 5  Contact via secure chat  Nephrology Pager # [phone] ([phone])

## 2024-04-02 NOTE — PROGRESS NOTES
Jason Hollins is a 63 y.o. male on day 28 of admission presenting with Soft tissue sarcoma (CMS/HCC).    Subjective   Marginal hypotension when resuming CVVH s/p trach. Treated with 250ml Albumin 5% and 1u PRBC (Hgb 7.8 without appropriate incrementation to 7.9). Patient with some bleeding from trach site this morning. Heparin infusion held and ENT re-dressed site.    Objective     Physical Exam  Vitals reviewed.   Constitutional:       Appearance: He is ill-appearing.      Comments: Trached.   HENT:      Head:      Comments: Edematous face/head/neck     Nose:      Comments: Dobhoff in R nare bridled in place at 65cm.     Mouth/Throat:      Mouth: Mucous membranes are dry.   Eyes:      General: Scleral icterus present.      Pupils: Pupils are equal, round, and reactive to light.      Comments: Conjunctival edema. Subconjunctival hemorrhage.   Neck:      Comments: Trach midline. 6-0 proximal Shiley XLT, dressing with bloody drainage. RIJ trialysis line in place, dressing c/d/i.   Cardiovascular:      Rate and Rhythm: Normal rate and regular rhythm.      Pulses:           Radial pulses are 1+ on the right side and 1+ on the left side.        Dorsalis pedis pulses are 1+ on the right side and 1+ on the left side.      Heart sounds: Normal heart sounds.   Pulmonary:      Comments: Mechanically ventilated via trach. Diminished breath sounds bilaterally. Equal chest rise. Current vent settings SIMV 50%, 550, 18, +10, 5 PS.  Abdominal:      General: Abdomen is protuberant. Bowel sounds are normal.      Palpations: Abdomen is soft.      Comments: Obese abdomen.   Genitourinary:     Comments: Dignicare in place.  Musculoskeletal:      Cervical back: Neck supple.      Comments: Generalized weakness.   Skin:     General: Skin is warm and dry.      Coloration: Skin is jaundiced.      Comments: Anasarca. Right flap site with wound VAC, no output. ANDERSON x 3 all with serosanguinous output.   Neurological:      GCS: GCS eye subscore is 4.  "GCS verbal subscore is 1. GCS motor subscore is 6.      Comments: Trached. Patient opening eyes spontaneously and following simple commands. Weak bilateral hand grasps and wiggles toes in left foot. Unable to move RLE.      Psychiatric:      Comments: HANNAH.     Last Recorded Vitals  Blood pressure (!) 108/47, pulse 82, temperature 36.3 °C (97.3 °F), resp. rate (!) 36, height 1.778 m (5' 10\"), weight 125 kg (275 lb 5.7 oz), SpO2 94 %.    VS over last 24h  Heart Rate:  [64-92]   Temp:  [36 °C (96.8 °F)-36.5 °C (97.7 °F)]   Resp:  [18-36]   BP: ()/(41-47)   SpO2:  [92 %-99 %]      Scheduled medications  albumin human, 25 g, intravenous, q6h  esomeprazole, 40 mg, nasogastric tube, Daily before breakfast  fludrocortisone, 0.1 mg, nasogastric tube, q12h  hydrocortisone sodium succinate, 50 mg, intravenous, q12h  midodrine, 20 mg, orogastric tube, q8h  perflutren protein A microsphere, 0.5 mL, intravenous, Once in imaging  sulfur hexafluoride microsphr, 2 mL, intravenous, Once in imaging  thiamine, 100 mg, intravenous, Daily      Continuous medications  [Held by provider] heparin, 0-4,000 Units/hr, Last Rate: Stopped (04/02/24 1013)  insulin regular infusion for cardiac surgery, 0-15 Units/hr, Last Rate: 4 Units/hr (04/02/24 1021)  lactated Ringer's, 5 mL/hr, Last Rate: 5 mL/hr (04/02/24 0600)  norepinephrine, 0.01-0.1 mcg/kg/min (Dosing Weight)      PRN medications  PRN medications: alteplase, calcium gluconate, calcium gluconate, dextrose **OR** glucagon, magnesium sulfate, magnesium sulfate, oxygen      Results for orders placed or performed during the hospital encounter of 03/05/24 (from the past 24 hour(s))   POCT GLUCOSE   Result Value Ref Range    POCT Glucose 163 (H) 74 - 99 mg/dL   POCT GLUCOSE   Result Value Ref Range    POCT Glucose 164 (H) 74 - 99 mg/dL   POCT GLUCOSE   Result Value Ref Range    POCT Glucose 172 (H) 74 - 99 mg/dL   Fibrinogen   Result Value Ref Range    Fibrinogen 163 (L) 200 - 400 mg/dL "   Calcium, ionized   Result Value Ref Range    POCT Calcium, Ionized 1.18 1.1 - 1.33 mmol/L   Renal Function Panel   Result Value Ref Range    Glucose 181 (H) 74 - 99 mg/dL    Sodium 134 (L) 136 - 145 mmol/L    Potassium 4.7 3.5 - 5.3 mmol/L    Chloride 99 98 - 107 mmol/L    Bicarbonate 22 21 - 32 mmol/L    Anion Gap 18 10 - 20 mmol/L    Urea Nitrogen 60 (H) 6 - 23 mg/dL    Creatinine 1.85 (H) 0.50 - 1.30 mg/dL    eGFR 41 (L) >60 mL/min/1.73m*2    Calcium 8.5 (L) 8.6 - 10.6 mg/dL    Phosphorus 3.2 2.5 - 4.9 mg/dL    Albumin 3.2 (L) 3.4 - 5.0 g/dL   Magnesium   Result Value Ref Range    Magnesium 2.35 1.60 - 2.40 mg/dL   CBC   Result Value Ref Range    WBC 10.4 4.4 - 11.3 x10*3/uL    nRBC 0.9 (H) 0.0 - 0.0 /100 WBCs    RBC 2.65 (L) 4.50 - 5.90 x10*6/uL    Hemoglobin 7.7 (L) 13.5 - 17.5 g/dL    Hematocrit 23.1 (L) 41.0 - 52.0 %    MCV 87 80 - 100 fL    MCH 29.1 26.0 - 34.0 pg    MCHC 33.3 32.0 - 36.0 g/dL    RDW 21.5 (H) 11.5 - 14.5 %    Platelets 50 (L) 150 - 450 x10*3/uL   CBC   Result Value Ref Range    WBC 11.0 4.4 - 11.3 x10*3/uL    nRBC 1.2 (H) 0.0 - 0.0 /100 WBCs    RBC 2.73 (L) 4.50 - 5.90 x10*6/uL    Hemoglobin 7.8 (L) 13.5 - 17.5 g/dL    Hematocrit 23.7 (L) 41.0 - 52.0 %    MCV 87 80 - 100 fL    MCH 28.6 26.0 - 34.0 pg    MCHC 32.9 32.0 - 36.0 g/dL    RDW 21.6 (H) 11.5 - 14.5 %    Platelets 58 (L) 150 - 450 x10*3/uL   Blood Gas Arterial Full Panel   Result Value Ref Range    POCT pH, Arterial 7.37 (L) 7.38 - 7.42 pH    POCT pCO2, Arterial 39 38 - 42 mm Hg    POCT pO2, Arterial 76 (L) 85 - 95 mm Hg    POCT SO2, Arterial 97 94 - 100 %    POCT Oxy Hemoglobin, Arterial 94.1 94.0 - 98.0 %    POCT Hematocrit Calculated, Arterial 25.0 (L) 41.0 - 52.0 %    POCT Sodium, Arterial 131 (L) 136 - 145 mmol/L    POCT Potassium, Arterial 5.1 3.5 - 5.3 mmol/L    POCT Chloride, Arterial 101 98 - 107 mmol/L    POCT Ionized Calcium, Arterial 1.20 1.10 - 1.33 mmol/L    POCT Glucose, Arterial 186 (H) 74 - 99 mg/dL    POCT  Lactate, Arterial 1.7 0.4 - 2.0 mmol/L    POCT Base Excess, Arterial -2.6 (L) -2.0 - 3.0 mmol/L    POCT HCO3 Calculated, Arterial 22.5 22.0 - 26.0 mmol/L    POCT Hemoglobin, Arterial 8.3 (L) 13.5 - 17.5 g/dL    POCT Anion Gap, Arterial 13 10 - 25 mmo/L    Patient Temperature 37.0 degrees Celsius    FiO2 50 %   POCT GLUCOSE   Result Value Ref Range    POCT Glucose 205 (H) 74 - 99 mg/dL   Coagulation Screen   Result Value Ref Range    Protime 24.5 (H) 9.8 - 12.8 seconds    INR 2.2 (H) 0.9 - 1.1    aPTT 59 (H) 27 - 38 seconds   CBC   Result Value Ref Range    WBC 9.3 4.4 - 11.3 x10*3/uL    nRBC 0.5 (H) 0.0 - 0.0 /100 WBCs    RBC 2.67 (L) 4.50 - 5.90 x10*6/uL    Hemoglobin 7.9 (L) 13.5 - 17.5 g/dL    Hematocrit 22.2 (L) 41.0 - 52.0 %    MCV 83 80 - 100 fL    MCH 29.6 26.0 - 34.0 pg    MCHC 35.6 32.0 - 36.0 g/dL    RDW 19.8 (H) 11.5 - 14.5 %    Platelets 55 (L) 150 - 450 x10*3/uL   Heparin Assay, UFH   Result Value Ref Range    Heparin Unfractionated 0.2 See Comment Below for Therapeutic Ranges IU/mL   Magnesium   Result Value Ref Range    Magnesium 2.31 1.60 - 2.40 mg/dL   Renal Function Panel   Result Value Ref Range    Glucose 210 (H) 74 - 99 mg/dL    Sodium 134 (L) 136 - 145 mmol/L    Potassium 4.7 3.5 - 5.3 mmol/L    Chloride 99 98 - 107 mmol/L    Bicarbonate 24 21 - 32 mmol/L    Anion Gap 16 10 - 20 mmol/L    Urea Nitrogen 67 (H) 6 - 23 mg/dL    Creatinine 2.10 (H) 0.50 - 1.30 mg/dL    eGFR 35 (L) >60 mL/min/1.73m*2    Calcium 8.7 8.6 - 10.6 mg/dL    Phosphorus 3.3 2.5 - 4.9 mg/dL    Albumin 3.1 (L) 3.4 - 5.0 g/dL   Calcium, ionized   Result Value Ref Range    POCT Calcium, Ionized 1.15 1.1 - 1.33 mmol/L   Fibrinogen   Result Value Ref Range    Fibrinogen 176 (L) 200 - 400 mg/dL   Hepatic function panel   Result Value Ref Range    Albumin 3.1 (L) 3.4 - 5.0 g/dL    Bilirubin, Total 20.1 (H) 0.0 - 1.2 mg/dL    Bilirubin, Direct 14.4 (H) 0.0 - 0.3 mg/dL    Alkaline Phosphatase 167 (H) 33 - 136 U/L     (H) 10 -  52 U/L     (H) 9 - 39 U/L    Total Protein 5.2 (L) 6.4 - 8.2 g/dL   Blood Gas Arterial Full Panel   Result Value Ref Range    POCT pH, Arterial 7.43 (H) 7.38 - 7.42 pH    POCT pCO2, Arterial 33 (L) 38 - 42 mm Hg    POCT pO2, Arterial 119 (H) 85 - 95 mm Hg    POCT SO2, Arterial 100 94 - 100 %    POCT Oxy Hemoglobin, Arterial 96.3 94.0 - 98.0 %    POCT Hematocrit Calculated, Arterial 25.0 (L) 41.0 - 52.0 %    POCT Sodium, Arterial 130 (L) 136 - 145 mmol/L    POCT Potassium, Arterial 4.7 3.5 - 5.3 mmol/L    POCT Chloride, Arterial 101 98 - 107 mmol/L    POCT Ionized Calcium, Arterial 1.15 1.10 - 1.33 mmol/L    POCT Glucose, Arterial 217 (H) 74 - 99 mg/dL    POCT Lactate, Arterial 1.5 0.4 - 2.0 mmol/L    POCT Base Excess, Arterial -2.0 -2.0 - 3.0 mmol/L    POCT HCO3 Calculated, Arterial 21.9 (L) 22.0 - 26.0 mmol/L    POCT Hemoglobin, Arterial 8.4 (L) 13.5 - 17.5 g/dL    POCT Anion Gap, Arterial 12 10 - 25 mmo/L    Patient Temperature 37.0 degrees Celsius    FiO2 50 %   CBC   Result Value Ref Range    WBC 9.5 4.4 - 11.3 x10*3/uL    nRBC 0.6 (H) 0.0 - 0.0 /100 WBCs    RBC 2.69 (L) 4.50 - 5.90 x10*6/uL    Hemoglobin 7.9 (L) 13.5 - 17.5 g/dL    Hematocrit 23.5 (L) 41.0 - 52.0 %    MCV 87 80 - 100 fL    MCH 29.4 26.0 - 34.0 pg    MCHC 33.6 32.0 - 36.0 g/dL    RDW 20.9 (H) 11.5 - 14.5 %    Platelets 58 (L) 150 - 450 x10*3/uL   POCT GLUCOSE   Result Value Ref Range    POCT Glucose 215 (H) 74 - 99 mg/dL   POCT GLUCOSE   Result Value Ref Range    POCT Glucose 173 (H) 74 - 99 mg/dL   POCT GLUCOSE   Result Value Ref Range    POCT Glucose 172 (H) 74 - 99 mg/dL   Coagulation Screen   Result Value Ref Range    Protime 24.6 (H) 9.8 - 12.8 seconds    INR 2.2 (H) 0.9 - 1.1    aPTT 93 (H) 27 - 38 seconds   Heparin Assay, UFH   Result Value Ref Range    Heparin Unfractionated 0.4 See Comment Below for Therapeutic Ranges IU/mL   POCT GLUCOSE   Result Value Ref Range    POCT Glucose 200 (H) 74 - 99 mg/dL   Transthoracic Echo (TTE)  Limited   Result Value Ref Range    LVOT diam 2.30 cm    LV Biplane EF 69 %    LA vol index A/L 28.0 ml/m2    LVIDd 5.20 cm    LV A4C EF 65.5    POCT GLUCOSE   Result Value Ref Range    POCT Glucose 210 (H) 74 - 99 mg/dL   POCT GLUCOSE   Result Value Ref Range    POCT Glucose 198 (H) 74 - 99 mg/dL   POCT GLUCOSE   Result Value Ref Range    POCT Glucose 202 (H) 74 - 99 mg/dL        Assessment/Plan   Jason Hollins is a 63 y.o. 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 Hawkins and Dr. Smith on 3/5/24.  Post operative course was uncomplicated and patient was on RNF until 3/20 for prolonged bed rest to avoid hip flexion to maintain flap integrity.  Patient was on prophylactic lovenox while on bedrest.     3/20: Cardiopulmonary arrest s/p CPR with ROSC after TPA, overnight started on heparin, epo, increased pressor requirements, increased swelling at flap site.     3/21: Hemorrhagic shock, MTP. Firm and large right flap site. Return to OR s/p Exploration of right thigh wound, Evacuation of hematoma. Suture ligation of multiple bleeding vessel and several points in gluteal area.     4/1: s/p Trach    Plan:  NEURO: History of myxoid chondrosarcoma s/p wide resection sarcoma, sciatic nerve neurolysis of right lower extremity with right gluteal reconstruction, pedicle ALT, vastus lateralus flap, pedicle gluteal and perisformis flap with Dr. Hawkins and Dr. Smith on 3/5/24 s/p cardiopulmonary arrest with ROSC 3/20. Documented to have followed commands off sedation. CT head 3/22 without acute process. Weaned off cisatracurium and propofol overnight 3/23-3/24. Ketamine weaned off 3/25 and Fentanyl weaned off 3/26 am. Repeat CT Head 3/26 without acute process. MRI Brain 3/30, negative for cerebral infarction or hemorrhage. Neuro exam  - Patient opening eyes to verbal stimulation and following simple commands. Weak bilateral hand grasps and wiggles toes in left foot. Unable to move RLE. Off all sedation.  - ongoing neuro and pain assessments  - avoid CNS depressants as able  - PT/OT -> PROM  - no need for soft wrist restraints at this time -> continue to reassess     CV: History of HTN, HLD. Acute cardiopulmonary arrest 2/2 to possible massive PE vs massive STEMI, received multiple rounds of CPR and one defibrillation.  Sustained ROSC achieved after TPA bolus. On high dose levophed, epinephrine, vaso, angioT2. Plan on 3/21 was for ECMO which was aborted secondary to large hemhorrge at right flap site. Had MTP and taken OR with 5L evacuated. ECHO 3/21: Normal LV, Mod enlarged RV, slight suggestion of a Townsend's sign / RV strain, low normal RV function. ECHO 3/22 with hyperdynamic LV. New onset Afib with RVR overnight 3/22-3/23, dosed with 150mg amio x2, started infusion at 1mg/min thereafter. AT2 weaned off. Amio infusion discontinued 3/25. Lactate downtrending. Vaso and epi weaned off. Remains in NSR. iEpo weaned off 3/27. Repeat TTE 3/29 essentially unchanged. Remains off levo.  - f/u repeat TTE -> ongoing evidence of RV overload  - continuous EKG/abp/cvp monitoring  - Goal map range 65-90  - Continue midodrine 20mg q8h  - Continue florinef 0.1mg BID   - Continue SDS with hydrocortisone 50mg q12h  - add Albumin 25% q6h x4 doses  - Hold heparin infusion for now given bleeding from trach site this am     PULM: Arrived to SICU intubated julio c-CPR. Concern for massive PE. Started on iEpo, currently on 0.05. Hypoxic respiratory failure. Severe ARDS. ETT exchanged 3/23. CT Chest 3/26 with interval JOHN consolidative/ground glass opacity. S/p Tracheostomy on 4/1. Currently on SIMV 50%, 550, 18, +10, 5 PS.   - ARDSnet lung protective strategies  - Wean FiO2 as tolerated  - ABGs prn  - additional pulm toilet prn  - daily CXR    ENT: Had epistaxis 3/23,  completed afrin bid x3 days. Bleeding from trach site this am, ENT re-dressed site.  - monitor, low threshold to consult ENT  - avoid NG placement for now     GI: NPO. Shock liver, pancreatitis. Elevated TG. Elevated lactate. Consulted ACS for potential bowel ischemia as cause of persistent lactic acidosis. CT imaging 3/22 with evidence of pancreatitis. No acute surgical indication per ACS. RUQ US 3/22 with thickening of GB wall without cholelithiasis. CT A/P 3/26 negative for acute bleed. LFTs downtrending. Worsening hyperbilirubinemia. Amylase and lipase now WNL. Repeat RUQ US 4/1 with nonspecific gallbladder wall edema and thickening which may be 2/2 generalized fluid overload, mild hepatomegaly with slight nodular contour and c/f steatosis and fibrosis, irregular hypoechoic region adjacent to hepatic veins.  - consult Hepatology, appreciate recs  - increase TFs to goal 45ml/hr  - prostat daily  - PPI for GI ppx  - Trend LFTs daily  - IV thiamine 100mg IV x7 days     : No history of renal disease. Baseline creatinine 0.6. Anuric RUTH. Elevated CK, downtrending. Metabolic acidosis, total body fluid overload, hyperkalemia. Started on CVVH 3/21, stopped bicarb infusion 3/22. Marino removed 3/24. Hyponatremia resolved. Net negative 126ml for past 24hrs.  - Nephrology following, appreciate recs -> discontinuing CVVH and switching to SLED  - Fluid goal negative 3L  - RFP BID and PRN  - Replete electrolytes judiciously  - Daily bladder scan     HEME: Patient was on ppx lovenox for DVT prophylaxis prior to cardiopulmonary arrest. Concern for massive PE patient received bolus of TPA during CPR. Started on heparin infusion overnight given concern for PE. Hemorrhagic shock 3/21, MTP and back to OR s/p Exploration of right thigh woundEvacuation of hematoma. Suture ligation of multiple bleeding vessel and several points in gluteal area. Hematology consulted 3/22 to r/o HLH. Transfused 1 RBC overnight 3/22-3/23.  Thrombocytopenia, coagulapathy, hypofibrinogenemia. LE Duplex 3/25 +acute occlusive R soleal DVT. Received 2u PRBC and 1u FFP on 3/26. Heparin infusion discontinued 3/26 to r/o HIT and transitioned to bival. PF4 negative, resumed heparin infuision 3/27. Elevated IL2R and decreased NK function. C/f HLH (low suspicion), empirically treated with decadron (3/28-3/31). Thrombocytopenia on 3/30 to 18k, received 5pk, did not increment. Heparin held. Thrombocytopenia likely 2/2 to consumptive process, hematology following. Received IVIG and 5pk plts 3/31. Plt count 58k this am.  - cbc, coags bid and prn  - fibrinogen daily  - Hold heparin infusion for now given bleeding from trach site this am  - Hematology following, appreciate recs -> maintain Plt count >35k  - ongoing monitoring for s/s bleeding  - close surveillance of RLE and drains  - Vasc Med signed off 3/27  - maintain active T&S (3/31)    ENDO: History of DM2. A1c 7.5%. TSH WNL 1/2024.   - wean insulin infusion as able   - q1h accuchecks per ICU protocol     ID: Afebrile. Leukocytosis. On broad antimicrobial therapy. MRSA screen + 2/28/24. Pan cultures sent 3/22. Sputum NTF, +MRSA screen (completed 5 day course mupirocin), UCx and BCx negative. Completed 7 day course vanc/zosyn 3/27.   - temp q4h, wbc daily  - ongoing monitoring for s/s infection    Lines:  - right brachial arterial line (3/20)  - RIJ trialysis (3/27)  - left subclavian MAC with mini MAC (3/20)  - PIV x1     Dispo: Continue ICU care. Patient seen and discussed with ICU attending Dr. Inga Peter, APRN-CNP  SICU phone [phone]    Critical Care time: 46 minutes

## 2024-04-02 NOTE — PROGRESS NOTES
Department of Plastic and Reconstructive Surgery  Daily Progress Note    Patient Name: Jason Hollins  MRN: [id number]  Date:  04/02/24     Subjective   Patient remains critically ill in CTICU, intubated with CRRT. Pt able to follow commands weakly,     Objective   Vitals:    04/02/24 1200   BP:    Pulse: 77   Resp: (!) 35   Temp:    SpO2: 93%     Physical Exam   Constitutional: Intubated, sedation weaned off. Opens eyes to verbal stimuli and tracks around room.   ENMT: MMM, dobhoff in R nare   Cardiovascular: RRR on telemetry monitor.  Respiratory/Thorax: Mechanically ventilated via trach   Gastrointestinal: Abdomen soft, protuberant.   Genitourinary: CVVHD until weaned from pressors.   Musculoskeletal: Minimal purposeful movement appreciated.   Extremities: Generalized pitting edema, slightly improved from prior. Right thigh edematous, but soft and compressible, no bruising or tissue induration noted. Incisional wound vac in place to flap incisions, holding suction at -75 mmHG, no leak or alarm present. ANDERSON drain x 3 with dark serous output.  Neurological:  Opens eyes to verbal stimuli and tracks around room.  Weak bilateral hand grasps and wiggles toes in left foot. Unable to move RLE.   Skin: Warm, dry.     Current Medications  Scheduled medications  albumin human, 25 g, intravenous, q6h  esomeprazole, 40 mg, nasogastric tube, Daily before breakfast  fludrocortisone, 0.1 mg, nasogastric tube, q12h  hydrocortisone sodium succinate, 50 mg, intravenous, q12h  midodrine, 20 mg, orogastric tube, q8h  perflutren protein A microsphere, 0.5 mL, intravenous, Once in imaging  sulfur hexafluoride microsphr, 2 mL, intravenous, Once in imaging  thiamine, 100 mg, intravenous, Daily    Continuous medications  [Held by provider] heparin, 0-4,000 Units/hr, Last Rate: Stopped (04/02/24 1013)  insulin regular infusion for cardiac surgery, 0-15 Units/hr, Last Rate: 5 Units/hr (04/02/24 1241)  lactated Ringer's, 5 mL/hr, Last Rate: 5  mL/hr (04/02/24 0600)    PRN medications  PRN medications: alteplase, calcium gluconate, calcium gluconate, dextrose **OR** glucagon, magnesium sulfate, magnesium sulfate, oxygen     Assessment   Jason Hollins 63 y.o. 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 Hawkins and Dr. Smith on 3/5/24. Post operative course c/b arrest requiring CPR with ROSC after TPA for assumed large PE on 3/20. Increased swelling at flap site appreciated overnight 3/20-3/21 and pt returned to OR for exploration of R flap site, evacuation of hematoma, suture ligation of multiple bleeding vessel sites in gluteal area and wound closure with Dr. Smith on 3/21. Pt has remained in CTICU for continued critical care evaluation and management postoperatively.     Plan/Recommendations  - Appreciate ongoing ICU evaluation and management following acute clinical decompensation event 3/20  - Maintain incisional wound vac to right gluteal/thigh area x10-14 days post-op (end date 4/4)       ·  Settings: -75mmHg low continuous suction        ·  Notify plastic surgery with any concerns of leak or obstruction   - Monitor right thigh for s/s of bleeding recurrence, has been stable on serial assessments of past week   - Continue ANDERSON drain care to x3 drains present at R thigh flap reconstruction site      ·  Strip drain tubing TID and PRN     ·  Monitor and record output q8h      ·  Keep drain sites C/D/I with daily drain dressing changes      ·  Notify plastics if ANDERSON drain output exceeds > 100 ml/hr in one drain or > 300 ml/hr collectively  - Plastic Surgery will continue to follow     Patient and plan discussed with Dr. Rodney PA-C  Plastic and Reconstructive Surgery  Epic chat, Pager [phone], Team phones: [phone]

## 2024-04-02 NOTE — BRIEF OP NOTE
Date: 2024  OR Location: [facility] OR    Name: Jason Hollins, : 1960, Age: 63 y.o., MRN: [id number], Sex: male    Diagnosis  Pre-op Diagnosis     * Acute respiratory failure with hypoxia (CMS/HCC) [J96.01] Post-op Diagnosis     * Acute respiratory failure with hypoxia (CMS/HCC) [J96.01]     Procedures  Creation Tracheostomy  28063 - ND TRACHEOSTOMY PLANNED SEPARATE PROCEDURE    Bronchoscopy Flexible  61615 - ND [facility] INCL FLUOR GDNCE DX W/CELL WASHG SPX      Surgeons      * Osman Perkins - Primary    Resident/Fellow/Other Assistant:  Surgeon(s) and Role:     * Hany Lind MD - Resident - Assisting     * Ed Naylor MD - Resident - Assisting    Procedure Summary  Anesthesia: General  ASA: IV  Anesthesia Staff: Anesthesiologist: Jemma Bragg MD MPH  C-AA: GEETHA Page  Estimated Blood Loss: 5mL  Intra-op Medications:   Administrations occurring from 1658 to 1758 on 24:   Medication Name Total Dose   sodium chloride 0.9 % irrigation solution 1,000 mL   surgical lubricant gel 1 Application   alteplase (Cathflo Activase) injection 2 mg Cannot be calculated   calcium gluconate in NS IV 1 g Cannot be calculated   calcium gluconate in NS IV 2 g Cannot be calculated   esomeprazole (NexIUM) suspension 40 mg Cannot be calculated   fludrocortisone (Florinef) tablet 0.1 mg Cannot be calculated   hydrocortisone sod succ (PF) (Solu-CORTEF) injection 50 mg Cannot be calculated   magnesium sulfate IV 2 g Cannot be calculated   magnesium sulfate IV 4 g Cannot be calculated   midodrine (Proamatine) tablet 20 mg Cannot be calculated   thiamine (Vitamin B1) injection 100 mg Cannot be calculated              Anesthesia Record               Intraprocedure I/O Totals          Intake    LR bolus 200.00 mL    Total Intake 200 mL          Specimen: No specimens collected     Staff:   Circulator: Maribell Wolf RN; Joby Rankin RN; Disha Arthur RN; Ed Stuart RN  Scrub Person: Maribell  Maryann RN; Edmundo Miller          Findings: Low-lying laryngotracheal complex requiring neck extension and traction superiorly.  6-0 proximal XLT Shiley inserted.  Inner cannula needed to be changed due to kinking.    Complications:  None; patient tolerated the procedure well.     Disposition: PACU - hemodynamically stable.  Condition: stable  Specimens Collected: No specimens collected  Attending Attestation:     Osman Perkins  Phone Number: [phone]

## 2024-04-02 NOTE — PROGRESS NOTES
Physical Therapy                 Therapy Communication Note    Patient Name: Jason Hollins  MRN: [id number]  Today's Date: 4/2/2024     Discipline: Physical Therapy    Missed Visit Reason: Missed Visit Reason:  (Trach placement yesterdy 4/2 at ~5:30 pm.  Will hold 24hrs and re-attempt tomorrow.)    Missed Time: Attempt

## 2024-04-02 NOTE — PROGRESS NOTES
Occupational Therapy                 Therapy Communication Note    Patient Name: Jason Hollins  MRN: [id number]  Today's Date: 4/2/2024     Discipline: Occupational Therapy    Missed Visit Reason: Missed Visit Reason: Patient placed on medical hold (s/p trach 4/1 approx 1700. Hold therapy 24hrs. No OT at this time.)    Missed Time: Attempt 0831

## 2024-04-03 ENCOUNTER — APPOINTMENT (OUTPATIENT)
Dept: RADIOLOGY | Facility: HOSPITAL | Age: 64
DRG: 003 | End: 2024-04-03
Payer: COMMERCIAL

## 2024-04-03 ENCOUNTER — APPOINTMENT (OUTPATIENT)
Dept: HEMATOLOGY/ONCOLOGY | Facility: CLINIC | Age: 64
End: 2024-04-03
Payer: COMMERCIAL

## 2024-04-03 LAB
ABO GROUP (TYPE) IN BLOOD: NORMAL
ALBUMIN SERPL BCP-MCNC: 3.3 G/DL (ref 3.4–5)
ALBUMIN SERPL BCP-MCNC: 3.6 G/DL (ref 3.4–5)
ALP SERPL-CCNC: 202 U/L (ref 33–136)
ALT SERPL W P-5'-P-CCNC: 131 U/L (ref 10–52)
ANION GAP BLDA CALCULATED.4IONS-SCNC: 10 MMO/L (ref 10–25)
ANION GAP BLDA CALCULATED.4IONS-SCNC: 11 MMO/L (ref 10–25)
ANION GAP BLDA CALCULATED.4IONS-SCNC: 12 MMO/L (ref 10–25)
ANION GAP BLDA CALCULATED.4IONS-SCNC: 14 MMO/L (ref 10–25)
ANION GAP BLDA CALCULATED.4IONS-SCNC: 5 MMO/L (ref 10–25)
ANION GAP SERPL CALC-SCNC: 13 MMOL/L (ref 10–20)
ANION GAP SERPL CALC-SCNC: 18 MMOL/L (ref 10–20)
ANION GAP SERPL CALC-SCNC: 19 MMOL/L (ref 10–20)
ANTIBODY SCREEN: NORMAL
APTT PPP: 115 SECONDS (ref 27–38)
APTT PPP: 40 SECONDS (ref 27–38)
AST SERPL W P-5'-P-CCNC: 126 U/L (ref 9–39)
BASE EXCESS BLDA CALC-SCNC: -2.7 MMOL/L (ref -2–3)
BASE EXCESS BLDA CALC-SCNC: -3.2 MMOL/L (ref -2–3)
BASE EXCESS BLDA CALC-SCNC: -3.6 MMOL/L (ref -2–3)
BASE EXCESS BLDA CALC-SCNC: -4.3 MMOL/L (ref -2–3)
BASE EXCESS BLDA CALC-SCNC: 3.9 MMOL/L (ref -2–3)
BILIRUB DIRECT SERPL-MCNC: 16.2 MG/DL (ref 0–0.3)
BILIRUB SERPL-MCNC: 23.5 MG/DL (ref 0–1.2)
BLOOD EXPIRATION DATE: NORMAL
BLOOD EXPIRATION DATE: NORMAL
BODY TEMPERATURE: 37 DEGREES CELSIUS
BUN SERPL-MCNC: 27 MG/DL (ref 6–23)
BUN SERPL-MCNC: 39 MG/DL (ref 6–23)
BUN SERPL-MCNC: 55 MG/DL (ref 6–23)
CA-I BLD-SCNC: 1.03 MMOL/L (ref 1.1–1.33)
CA-I BLD-SCNC: 1.17 MMOL/L (ref 1.1–1.33)
CA-I BLDA-SCNC: 1.09 MMOL/L (ref 1.1–1.33)
CA-I BLDA-SCNC: 1.11 MMOL/L (ref 1.1–1.33)
CA-I BLDA-SCNC: 1.12 MMOL/L (ref 1.1–1.33)
CA-I BLDA-SCNC: 1.12 MMOL/L (ref 1.1–1.33)
CA-I BLDA-SCNC: 1.16 MMOL/L (ref 1.1–1.33)
CALCIUM SERPL-MCNC: 8 MG/DL (ref 8.6–10.6)
CALCIUM SERPL-MCNC: 8.4 MG/DL (ref 8.6–10.6)
CALCIUM SERPL-MCNC: 8.6 MG/DL (ref 8.6–10.6)
CHLORIDE BLDA-SCNC: 102 MMOL/L (ref 98–107)
CHLORIDE BLDA-SCNC: 96 MMOL/L (ref 98–107)
CHLORIDE BLDA-SCNC: 97 MMOL/L (ref 98–107)
CHLORIDE SERPL-SCNC: 100 MMOL/L (ref 98–107)
CHLORIDE SERPL-SCNC: 96 MMOL/L (ref 98–107)
CHLORIDE SERPL-SCNC: 97 MMOL/L (ref 98–107)
CO2 SERPL-SCNC: 23 MMOL/L (ref 21–32)
CO2 SERPL-SCNC: 25 MMOL/L (ref 21–32)
CO2 SERPL-SCNC: 28 MMOL/L (ref 21–32)
CREAT SERPL-MCNC: 1.12 MG/DL (ref 0.5–1.3)
CREAT SERPL-MCNC: 1.76 MG/DL (ref 0.5–1.3)
CREAT SERPL-MCNC: 1.86 MG/DL (ref 0.5–1.3)
DISPENSE STATUS: NORMAL
DISPENSE STATUS: NORMAL
EGFRCR SERPLBLD CKD-EPI 2021: 40 ML/MIN/1.73M*2
EGFRCR SERPLBLD CKD-EPI 2021: 43 ML/MIN/1.73M*2
EGFRCR SERPLBLD CKD-EPI 2021: 74 ML/MIN/1.73M*2
ERYTHROCYTE [DISTWIDTH] IN BLOOD BY AUTOMATED COUNT: 20.4 % (ref 11.5–14.5)
ERYTHROCYTE [DISTWIDTH] IN BLOOD BY AUTOMATED COUNT: 20.8 % (ref 11.5–14.5)
ERYTHROCYTE [DISTWIDTH] IN BLOOD BY AUTOMATED COUNT: 21.2 % (ref 11.5–14.5)
ERYTHROCYTE [DISTWIDTH] IN BLOOD BY AUTOMATED COUNT: 21.8 % (ref 11.5–14.5)
FIBRINOGEN PPP-MCNC: 234 MG/DL (ref 200–400)
GLUCOSE BLD MANUAL STRIP-MCNC: 118 MG/DL (ref 74–99)
GLUCOSE BLD MANUAL STRIP-MCNC: 127 MG/DL (ref 74–99)
GLUCOSE BLD MANUAL STRIP-MCNC: 129 MG/DL (ref 74–99)
GLUCOSE BLD MANUAL STRIP-MCNC: 130 MG/DL (ref 74–99)
GLUCOSE BLD MANUAL STRIP-MCNC: 142 MG/DL (ref 74–99)
GLUCOSE BLD MANUAL STRIP-MCNC: 174 MG/DL (ref 74–99)
GLUCOSE BLD MANUAL STRIP-MCNC: 199 MG/DL (ref 74–99)
GLUCOSE BLD MANUAL STRIP-MCNC: 229 MG/DL (ref 74–99)
GLUCOSE BLDA-MCNC: 132 MG/DL (ref 74–99)
GLUCOSE BLDA-MCNC: 178 MG/DL (ref 74–99)
GLUCOSE BLDA-MCNC: 187 MG/DL (ref 74–99)
GLUCOSE BLDA-MCNC: 238 MG/DL (ref 74–99)
GLUCOSE BLDA-MCNC: 242 MG/DL (ref 74–99)
GLUCOSE SERPL-MCNC: 119 MG/DL (ref 74–99)
GLUCOSE SERPL-MCNC: 140 MG/DL (ref 74–99)
GLUCOSE SERPL-MCNC: 201 MG/DL (ref 74–99)
HCO3 BLDA-SCNC: 23 MMOL/L (ref 22–26)
HCO3 BLDA-SCNC: 24.3 MMOL/L (ref 22–26)
HCO3 BLDA-SCNC: 25.4 MMOL/L (ref 22–26)
HCO3 BLDA-SCNC: 26.1 MMOL/L (ref 22–26)
HCO3 BLDA-SCNC: 27.6 MMOL/L (ref 22–26)
HCT VFR BLD AUTO: 21.6 % (ref 41–52)
HCT VFR BLD AUTO: 22.1 % (ref 41–52)
HCT VFR BLD AUTO: 22.6 % (ref 41–52)
HCT VFR BLD AUTO: 23.4 % (ref 41–52)
HCT VFR BLD EST: 23 % (ref 41–52)
HCT VFR BLD EST: 23 % (ref 41–52)
HCT VFR BLD EST: 24 % (ref 41–52)
HCT VFR BLD EST: 24 % (ref 41–52)
HCT VFR BLD EST: 25 % (ref 41–52)
HGB BLD-MCNC: 7.3 G/DL (ref 13.5–17.5)
HGB BLD-MCNC: 7.7 G/DL (ref 13.5–17.5)
HGB BLD-MCNC: 7.9 G/DL (ref 13.5–17.5)
HGB BLD-MCNC: 8.1 G/DL (ref 13.5–17.5)
HGB BLDA-MCNC: 7.7 G/DL (ref 13.5–17.5)
HGB BLDA-MCNC: 7.7 G/DL (ref 13.5–17.5)
HGB BLDA-MCNC: 8 G/DL (ref 13.5–17.5)
HGB BLDA-MCNC: 8.1 G/DL (ref 13.5–17.5)
HGB BLDA-MCNC: 8.4 G/DL (ref 13.5–17.5)
INHALED O2 CONCENTRATION: 100 %
INHALED O2 CONCENTRATION: 50 %
INR PPP: 2.5 (ref 0.9–1.1)
INR PPP: 2.5 (ref 0.9–1.1)
LACTATE BLDA-SCNC: 1.3 MMOL/L (ref 0.4–2)
LACTATE BLDA-SCNC: 1.9 MMOL/L (ref 0.4–2)
LACTATE BLDA-SCNC: 1.9 MMOL/L (ref 0.4–2)
LACTATE BLDA-SCNC: 2.6 MMOL/L (ref 0.4–2)
LACTATE BLDA-SCNC: 2.9 MMOL/L (ref 0.4–2)
LACTATE SERPL-SCNC: 2 MMOL/L (ref 0.4–2)
MAGNESIUM SERPL-MCNC: 2 MG/DL (ref 1.6–2.4)
MAGNESIUM SERPL-MCNC: 2.08 MG/DL (ref 1.6–2.4)
MAGNESIUM SERPL-MCNC: 2.19 MG/DL (ref 1.6–2.4)
MCH RBC QN AUTO: 29 PG (ref 26–34)
MCH RBC QN AUTO: 29.6 PG (ref 26–34)
MCH RBC QN AUTO: 29.6 PG (ref 26–34)
MCH RBC QN AUTO: 29.7 PG (ref 26–34)
MCHC RBC AUTO-ENTMCNC: 33 G/DL (ref 32–36)
MCHC RBC AUTO-ENTMCNC: 33.8 G/DL (ref 32–36)
MCHC RBC AUTO-ENTMCNC: 35.6 G/DL (ref 32–36)
MCHC RBC AUTO-ENTMCNC: 35.8 G/DL (ref 32–36)
MCV RBC AUTO: 83 FL (ref 80–100)
MCV RBC AUTO: 83 FL (ref 80–100)
MCV RBC AUTO: 88 FL (ref 80–100)
MCV RBC AUTO: 88 FL (ref 80–100)
NRBC BLD-RTO: 0.9 /100 WBCS (ref 0–0)
NRBC BLD-RTO: 1.8 /100 WBCS (ref 0–0)
NRBC BLD-RTO: 2.6 /100 WBCS (ref 0–0)
NRBC BLD-RTO: 2.6 /100 WBCS (ref 0–0)
OXYHGB MFR BLDA: 93.6 % (ref 94–98)
OXYHGB MFR BLDA: 94.2 % (ref 94–98)
OXYHGB MFR BLDA: 95.6 % (ref 94–98)
OXYHGB MFR BLDA: 96.7 % (ref 94–98)
OXYHGB MFR BLDA: 97.4 % (ref 94–98)
PCO2 BLDA: 37 MM HG (ref 38–42)
PCO2 BLDA: 49 MM HG (ref 38–42)
PCO2 BLDA: 53 MM HG (ref 38–42)
PCO2 BLDA: 75 MM HG (ref 38–42)
PCO2 BLDA: 80 MM HG (ref 38–42)
PH BLDA: 7.11 PH (ref 7.38–7.42)
PH BLDA: 7.15 PH (ref 7.38–7.42)
PH BLDA: 7.27 PH (ref 7.38–7.42)
PH BLDA: 7.28 PH (ref 7.38–7.42)
PH BLDA: 7.48 PH (ref 7.38–7.42)
PHOSPHATE SERPL-MCNC: 1.4 MG/DL (ref 2.5–4.9)
PHOSPHATE SERPL-MCNC: 1.7 MG/DL (ref 2.5–4.9)
PHOSPHATE SERPL-MCNC: 3.7 MG/DL (ref 2.5–4.9)
PLATELET # BLD AUTO: 44 X10*3/UL (ref 150–450)
PLATELET # BLD AUTO: 49 X10*3/UL (ref 150–450)
PLATELET # BLD AUTO: 58 X10*3/UL (ref 150–450)
PLATELET # BLD AUTO: 59 X10*3/UL (ref 150–450)
PO2 BLDA: 253 MM HG (ref 85–95)
PO2 BLDA: 321 MM HG (ref 85–95)
PO2 BLDA: 77 MM HG (ref 85–95)
PO2 BLDA: 77 MM HG (ref 85–95)
PO2 BLDA: 83 MM HG (ref 85–95)
POTASSIUM BLDA-SCNC: 3.6 MMOL/L (ref 3.5–5.3)
POTASSIUM BLDA-SCNC: 4.2 MMOL/L (ref 3.5–5.3)
POTASSIUM BLDA-SCNC: 4.2 MMOL/L (ref 3.5–5.3)
POTASSIUM BLDA-SCNC: 5.3 MMOL/L (ref 3.5–5.3)
POTASSIUM BLDA-SCNC: 5.3 MMOL/L (ref 3.5–5.3)
POTASSIUM SERPL-SCNC: 3.8 MMOL/L (ref 3.5–5.3)
POTASSIUM SERPL-SCNC: 4 MMOL/L (ref 3.5–5.3)
POTASSIUM SERPL-SCNC: 4.6 MMOL/L (ref 3.5–5.3)
PRODUCT BLOOD TYPE: 5100
PRODUCT BLOOD TYPE: 7300
PRODUCT CODE: NORMAL
PRODUCT CODE: NORMAL
PROT SERPL-MCNC: 5.5 G/DL (ref 6.4–8.2)
PROTHROMBIN TIME: 28.1 SECONDS (ref 9.8–12.8)
PROTHROMBIN TIME: 28.8 SECONDS (ref 9.8–12.8)
RBC # BLD AUTO: 2.52 X10*6/UL (ref 4.5–5.9)
RBC # BLD AUTO: 2.59 X10*6/UL (ref 4.5–5.9)
RBC # BLD AUTO: 2.67 X10*6/UL (ref 4.5–5.9)
RBC # BLD AUTO: 2.74 X10*6/UL (ref 4.5–5.9)
RH FACTOR (ANTIGEN D): NORMAL
SAO2 % BLDA: 100 % (ref 94–100)
SAO2 % BLDA: 96 % (ref 94–100)
SAO2 % BLDA: 97 % (ref 94–100)
SAO2 % BLDA: 98 % (ref 94–100)
SAO2 % BLDA: 99 % (ref 94–100)
SODIUM BLDA-SCNC: 129 MMOL/L (ref 136–145)
SODIUM BLDA-SCNC: 130 MMOL/L (ref 136–145)
SODIUM BLDA-SCNC: 131 MMOL/L (ref 136–145)
SODIUM BLDA-SCNC: 132 MMOL/L (ref 136–145)
SODIUM BLDA-SCNC: 133 MMOL/L (ref 136–145)
SODIUM SERPL-SCNC: 134 MMOL/L (ref 136–145)
SODIUM SERPL-SCNC: 135 MMOL/L (ref 136–145)
SODIUM SERPL-SCNC: 137 MMOL/L (ref 136–145)
UFH PPP CHRO-ACNC: 0.4 IU/ML
UNIT ABO: NORMAL
UNIT ABO: NORMAL
UNIT NUMBER: NORMAL
UNIT NUMBER: NORMAL
UNIT RH: NORMAL
UNIT RH: NORMAL
UNIT VOLUME: 207
UNIT VOLUME: 342
WBC # BLD AUTO: 3.1 X10*3/UL (ref 4.4–11.3)
WBC # BLD AUTO: 4 X10*3/UL (ref 4.4–11.3)
WBC # BLD AUTO: 4.3 X10*3/UL (ref 4.4–11.3)
WBC # BLD AUTO: 4.6 X10*3/UL (ref 4.4–11.3)

## 2024-04-03 PROCEDURE — 99232 SBSQ HOSP IP/OBS MODERATE 35: CPT | Performed by: PHYSICIAN ASSISTANT

## 2024-04-03 PROCEDURE — 76705 ECHO EXAM OF ABDOMEN: CPT | Mod: DISTINCT PROCEDURAL SERVICE | Performed by: RADIOLOGY

## 2024-04-03 PROCEDURE — 2500000005 HC RX 250 GENERAL PHARMACY W/O HCPCS: Performed by: STUDENT IN AN ORGANIZED HEALTH CARE EDUCATION/TRAINING PROGRAM

## 2024-04-03 PROCEDURE — 0BJ08ZZ INSPECTION OF TRACHEOBRONCHIAL TREE, VIA NATURAL OR ARTIFICIAL OPENING ENDOSCOPIC: ICD-10-PCS

## 2024-04-03 PROCEDURE — 2500000004 HC RX 250 GENERAL PHARMACY W/ HCPCS (ALT 636 FOR OP/ED)

## 2024-04-03 PROCEDURE — 31622 DX BRONCHOSCOPE/WASH: CPT

## 2024-04-03 PROCEDURE — 99024 POSTOP FOLLOW-UP VISIT: CPT | Performed by: STUDENT IN AN ORGANIZED HEALTH CARE EDUCATION/TRAINING PROGRAM

## 2024-04-03 PROCEDURE — 99233 SBSQ HOSP IP/OBS HIGH 50: CPT | Performed by: STUDENT IN AN ORGANIZED HEALTH CARE EDUCATION/TRAINING PROGRAM

## 2024-04-03 PROCEDURE — 93975 VASCULAR STUDY: CPT | Mod: 59

## 2024-04-03 PROCEDURE — 84132 ASSAY OF SERUM POTASSIUM: CPT

## 2024-04-03 PROCEDURE — 37799 UNLISTED PX VASCULAR SURGERY: CPT

## 2024-04-03 PROCEDURE — 99291 CRITICAL CARE FIRST HOUR: CPT | Performed by: ANESTHESIOLOGY

## 2024-04-03 PROCEDURE — 71045 X-RAY EXAM CHEST 1 VIEW: CPT

## 2024-04-03 PROCEDURE — 2500000001 HC RX 250 WO HCPCS SELF ADMINISTERED DRUGS (ALT 637 FOR MEDICARE OP)

## 2024-04-03 PROCEDURE — 86920 COMPATIBILITY TEST SPIN: CPT

## 2024-04-03 PROCEDURE — 86901 BLOOD TYPING SEROLOGIC RH(D): CPT

## 2024-04-03 PROCEDURE — 85520 HEPARIN ASSAY: CPT

## 2024-04-03 PROCEDURE — 36430 TRANSFUSION BLD/BLD COMPNT: CPT

## 2024-04-03 PROCEDURE — 82330 ASSAY OF CALCIUM: CPT | Performed by: STUDENT IN AN ORGANIZED HEALTH CARE EDUCATION/TRAINING PROGRAM

## 2024-04-03 PROCEDURE — 36415 COLL VENOUS BLD VENIPUNCTURE: CPT

## 2024-04-03 PROCEDURE — 2500000004 HC RX 250 GENERAL PHARMACY W/ HCPCS (ALT 636 FOR OP/ED): Mod: JZ

## 2024-04-03 PROCEDURE — 2500000002 HC RX 250 W HCPCS SELF ADMINISTERED DRUGS (ALT 637 FOR MEDICARE OP, ALT 636 FOR OP/ED)

## 2024-04-03 PROCEDURE — P9037 PLATE PHERES LEUKOREDU IRRAD: HCPCS

## 2024-04-03 PROCEDURE — 0WCQ8ZZ EXTIRPATION OF MATTER FROM RESPIRATORY TRACT, VIA NATURAL OR ARTIFICIAL OPENING ENDOSCOPIC: ICD-10-PCS | Performed by: STUDENT IN AN ORGANIZED HEALTH CARE EDUCATION/TRAINING PROGRAM

## 2024-04-03 PROCEDURE — 85384 FIBRINOGEN ACTIVITY: CPT

## 2024-04-03 PROCEDURE — 82248 BILIRUBIN DIRECT: CPT

## 2024-04-03 PROCEDURE — 99291 CRITICAL CARE FIRST HOUR: CPT

## 2024-04-03 PROCEDURE — 85018 HEMOGLOBIN: CPT

## 2024-04-03 PROCEDURE — 2500000005 HC RX 250 GENERAL PHARMACY W/O HCPCS

## 2024-04-03 PROCEDURE — 31622 DX BRONCHOSCOPE/WASH: CPT | Performed by: STUDENT IN AN ORGANIZED HEALTH CARE EDUCATION/TRAINING PROGRAM

## 2024-04-03 PROCEDURE — 87075 CULTR BACTERIA EXCEPT BLOOD: CPT

## 2024-04-03 PROCEDURE — 82947 ASSAY GLUCOSE BLOOD QUANT: CPT

## 2024-04-03 PROCEDURE — 71045 X-RAY EXAM CHEST 1 VIEW: CPT | Performed by: RADIOLOGY

## 2024-04-03 PROCEDURE — 85610 PROTHROMBIN TIME: CPT

## 2024-04-03 PROCEDURE — 87186 SC STD MICRODIL/AGAR DIL: CPT

## 2024-04-03 PROCEDURE — 84100 ASSAY OF PHOSPHORUS: CPT

## 2024-04-03 PROCEDURE — 93975 VASCULAR STUDY: CPT | Mod: DISTINCT PROCEDURAL SERVICE | Performed by: RADIOLOGY

## 2024-04-03 PROCEDURE — P9017 PLASMA 1 DONOR FRZ W/IN 8 HR: HCPCS

## 2024-04-03 PROCEDURE — 2500000004 HC RX 250 GENERAL PHARMACY W/ HCPCS (ALT 636 FOR OP/ED): Performed by: PHYSICIAN ASSISTANT

## 2024-04-03 PROCEDURE — 2020000001 HC ICU ROOM DAILY

## 2024-04-03 PROCEDURE — 94003 VENT MGMT INPAT SUBQ DAY: CPT

## 2024-04-03 PROCEDURE — P9047 ALBUMIN (HUMAN), 25%, 50ML: HCPCS | Mod: JZ

## 2024-04-03 PROCEDURE — A4217 STERILE WATER/SALINE, 500 ML: HCPCS

## 2024-04-03 PROCEDURE — 83735 ASSAY OF MAGNESIUM: CPT

## 2024-04-03 PROCEDURE — 85027 COMPLETE CBC AUTOMATED: CPT

## 2024-04-03 PROCEDURE — 83605 ASSAY OF LACTIC ACID: CPT

## 2024-04-03 PROCEDURE — 99232 SBSQ HOSP IP/OBS MODERATE 35: CPT | Performed by: STUDENT IN AN ORGANIZED HEALTH CARE EDUCATION/TRAINING PROGRAM

## 2024-04-03 PROCEDURE — P9040 RBC LEUKOREDUCED IRRADIATED: HCPCS

## 2024-04-03 PROCEDURE — 31535 LARYNGOSCOPY W/BIOPSY: CPT | Performed by: STUDENT IN AN ORGANIZED HEALTH CARE EDUCATION/TRAINING PROGRAM

## 2024-04-03 RX ORDER — PROPOFOL 10 MG/ML
5-50 INJECTION, EMULSION INTRAVENOUS CONTINUOUS
Status: DISCONTINUED | OUTPATIENT
Start: 2024-04-03 | End: 2024-04-05

## 2024-04-03 RX ORDER — ROCURONIUM BROMIDE 10 MG/ML
INJECTION, SOLUTION INTRAVENOUS
Status: COMPLETED
Start: 2024-04-03 | End: 2024-04-03

## 2024-04-03 RX ORDER — NOREPINEPHRINE BITARTRATE/D5W 8 MG/250ML
.01-.2 PLASTIC BAG, INJECTION (ML) INTRAVENOUS CONTINUOUS
Status: DISCONTINUED | OUTPATIENT
Start: 2024-04-03 | End: 2024-04-06

## 2024-04-03 RX ORDER — FENTANYL CITRATE 50 UG/ML
INJECTION, SOLUTION INTRAMUSCULAR; INTRAVENOUS
Status: COMPLETED
Start: 2024-04-03 | End: 2024-04-03

## 2024-04-03 RX ORDER — ROCURONIUM BROMIDE 10 MG/ML
100 INJECTION, SOLUTION INTRAVENOUS ONCE
Status: COMPLETED | OUTPATIENT
Start: 2024-04-03 | End: 2024-04-03

## 2024-04-03 RX ORDER — ROCURONIUM BROMIDE 10 MG/ML
50 INJECTION, SOLUTION INTRAVENOUS ONCE
Status: COMPLETED | OUTPATIENT
Start: 2024-04-03 | End: 2024-04-03

## 2024-04-03 RX ORDER — INSULIN GLARGINE 100 [IU]/ML
10 INJECTION, SOLUTION SUBCUTANEOUS EVERY 12 HOURS
Status: DISCONTINUED | OUTPATIENT
Start: 2024-04-03 | End: 2024-04-06

## 2024-04-03 RX ORDER — DEXMEDETOMIDINE HYDROCHLORIDE 4 UG/ML
.2-1.5 INJECTION, SOLUTION INTRAVENOUS CONTINUOUS
Status: DISCONTINUED | OUTPATIENT
Start: 2024-04-03 | End: 2024-04-03

## 2024-04-03 RX ORDER — PROPOFOL 10 MG/ML
5-50 INJECTION, EMULSION INTRAVENOUS CONTINUOUS
Status: DISCONTINUED | OUTPATIENT
Start: 2024-04-03 | End: 2024-04-03

## 2024-04-03 RX ORDER — VANCOMYCIN HYDROCHLORIDE 1 G/20ML
INJECTION, POWDER, LYOPHILIZED, FOR SOLUTION INTRAVENOUS DAILY PRN
Status: DISCONTINUED | OUTPATIENT
Start: 2024-04-03 | End: 2024-04-06

## 2024-04-03 RX ORDER — PROPOFOL 10 MG/ML
200 INJECTION, EMULSION INTRAVENOUS ONCE
Status: COMPLETED | OUTPATIENT
Start: 2024-04-03 | End: 2024-04-03

## 2024-04-03 RX ORDER — ROCURONIUM BROMIDE 10 MG/ML
INJECTION, SOLUTION INTRAVENOUS
Status: DISPENSED
Start: 2024-04-03 | End: 2024-04-04

## 2024-04-03 RX ORDER — INSULIN LISPRO 100 [IU]/ML
0-10 INJECTION, SOLUTION INTRAVENOUS; SUBCUTANEOUS EVERY 4 HOURS
Status: DISCONTINUED | OUTPATIENT
Start: 2024-04-03 | End: 2024-04-04

## 2024-04-03 RX ORDER — ALBUMIN HUMAN 250 G/1000ML
SOLUTION INTRAVENOUS
Status: COMPLETED
Start: 2024-04-03 | End: 2024-04-03

## 2024-04-03 RX ORDER — PHENYLEPHRINE HCL IN 0.9% NACL 0.4MG/10ML
SYRINGE (ML) INTRAVENOUS
Status: DISPENSED
Start: 2024-04-03 | End: 2024-04-04

## 2024-04-03 RX ORDER — ALBUMIN HUMAN 250 G/1000ML
25 SOLUTION INTRAVENOUS ONCE
Status: COMPLETED | OUTPATIENT
Start: 2024-04-03 | End: 2024-04-03

## 2024-04-03 RX ORDER — ACETYLCYSTEINE 100 MG/ML
4 SOLUTION ORAL; RESPIRATORY (INHALATION) ONCE
Status: COMPLETED | OUTPATIENT
Start: 2024-04-03 | End: 2024-04-03

## 2024-04-03 RX ORDER — FENTANYL CITRATE 50 UG/ML
100 INJECTION, SOLUTION INTRAMUSCULAR; INTRAVENOUS ONCE
Status: COMPLETED | OUTPATIENT
Start: 2024-04-03 | End: 2024-04-03

## 2024-04-03 RX ADMIN — ALBUMIN HUMAN 25 G: 250 SOLUTION INTRAVENOUS at 22:59

## 2024-04-03 RX ADMIN — FLUDROCORTISONE ACETATE 0.1 MG: 0.1 TABLET ORAL at 01:02

## 2024-04-03 RX ADMIN — INSULIN LISPRO 2 UNITS: 100 INJECTION, SOLUTION INTRAVENOUS; SUBCUTANEOUS at 17:29

## 2024-04-03 RX ADMIN — DEXMEDETOMIDINE HYDROCHLORIDE 0.25 MCG/KG/HR: 400 INJECTION INTRAVENOUS at 11:33

## 2024-04-03 RX ADMIN — PHYTONADIONE 10 MG: 10 INJECTION, EMULSION INTRAMUSCULAR; INTRAVENOUS; SUBCUTANEOUS at 17:18

## 2024-04-03 RX ADMIN — PROPOFOL 15 MCG/KG/MIN: 10 INJECTION, EMULSION INTRAVENOUS at 13:06

## 2024-04-03 RX ADMIN — NOREPINEPHRINE BITARTRATE 0.1 MCG/KG/MIN: 8 INJECTION, SOLUTION INTRAVENOUS at 17:17

## 2024-04-03 RX ADMIN — MIDODRINE HYDROCHLORIDE 20 MG: 10 TABLET ORAL at 01:02

## 2024-04-03 RX ADMIN — NOREPINEPHRINE BITARTRATE 0.12 MCG/KG/MIN: 8 INJECTION, SOLUTION INTRAVENOUS at 13:06

## 2024-04-03 RX ADMIN — VANCOMYCIN HYDROCHLORIDE 2000 MG: 5 INJECTION, POWDER, LYOPHILIZED, FOR SOLUTION INTRAVENOUS at 17:18

## 2024-04-03 RX ADMIN — ROCURONIUM BROMIDE 50 MG: 10 INJECTION INTRAVENOUS at 23:00

## 2024-04-03 RX ADMIN — INSULIN GLARGINE 10 UNITS: 100 INJECTION, SOLUTION SUBCUTANEOUS at 20:41

## 2024-04-03 RX ADMIN — FENTANYL CITRATE 100 MCG: 50 INJECTION, SOLUTION INTRAMUSCULAR; INTRAVENOUS at 13:33

## 2024-04-03 RX ADMIN — THIAMINE HYDROCHLORIDE 100 MG: 100 INJECTION, SOLUTION INTRAMUSCULAR; INTRAVENOUS at 09:36

## 2024-04-03 RX ADMIN — HYDROCORTISONE SODIUM SUCCINATE 50 MG: 100 INJECTION, POWDER, FOR SOLUTION INTRAMUSCULAR; INTRAVENOUS at 03:30

## 2024-04-03 RX ADMIN — INSULIN GLARGINE 10 UNITS: 100 INJECTION, SOLUTION SUBCUTANEOUS at 10:36

## 2024-04-03 RX ADMIN — PROPOFOL 200 MG: 10 INJECTION, EMULSION INTRAVENOUS at 15:45

## 2024-04-03 RX ADMIN — INSULIN LISPRO 2 UNITS: 100 INJECTION, SOLUTION INTRAVENOUS; SUBCUTANEOUS at 11:51

## 2024-04-03 RX ADMIN — TRANEXAMIC ACID 500 MG: 100 INJECTION, SOLUTION INTRAVENOUS at 23:11

## 2024-04-03 RX ADMIN — PIPERACILLIN SODIUM AND TAZOBACTAM SODIUM 3.38 G: 3; .375 INJECTION, SOLUTION INTRAVENOUS at 17:18

## 2024-04-03 RX ADMIN — PROPOFOL 10 MCG/KG/MIN: 10 INJECTION, EMULSION INTRAVENOUS at 10:03

## 2024-04-03 RX ADMIN — FENTANYL CITRATE 100 MCG: 50 INJECTION INTRAMUSCULAR; INTRAVENOUS at 13:33

## 2024-04-03 RX ADMIN — ACETYLCYSTEINE 4 ML: 100 SOLUTION ORAL; RESPIRATORY (INHALATION) at 13:15

## 2024-04-03 RX ADMIN — DEXMEDETOMIDINE HYDROCHLORIDE 0.4 MCG/KG/HR: 400 INJECTION INTRAVENOUS at 04:22

## 2024-04-03 RX ADMIN — ALBUMIN HUMAN 25 G: 0.25 SOLUTION INTRAVENOUS at 22:59

## 2024-04-03 RX ADMIN — ALBUMIN HUMAN 25 G: 0.25 SOLUTION INTRAVENOUS at 03:29

## 2024-04-03 RX ADMIN — POTASSIUM PHOSPHATE, MONOBASIC POTASSIUM PHOSPHATE, DIBASIC 21 MMOL: 224; 236 INJECTION, SOLUTION, CONCENTRATE INTRAVENOUS at 13:49

## 2024-04-03 RX ADMIN — TRANEXAMIC ACID 500 MG: 100 INJECTION, SOLUTION INTRAVENOUS at 15:45

## 2024-04-03 RX ADMIN — HYDROCORTISONE SODIUM SUCCINATE 50 MG: 100 INJECTION, POWDER, FOR SOLUTION INTRAMUSCULAR; INTRAVENOUS at 16:36

## 2024-04-03 RX ADMIN — ESOMEPRAZOLE MAGNESIUM 40 MG: 40 FOR SUSPENSION ORAL at 07:00

## 2024-04-03 RX ADMIN — NOREPINEPHRINE BITARTRATE 0.16 MCG/KG/MIN: 8 INJECTION, SOLUTION INTRAVENOUS at 20:34

## 2024-04-03 RX ADMIN — PIPERACILLIN SODIUM AND TAZOBACTAM SODIUM 3.38 G: 3; .375 INJECTION, SOLUTION INTRAVENOUS at 22:38

## 2024-04-03 RX ADMIN — ROCURONIUM BROMIDE 100 MG: 10 INJECTION INTRAVENOUS at 15:00

## 2024-04-03 RX ADMIN — INSULIN LISPRO 4 UNITS: 100 INJECTION, SOLUTION INTRAVENOUS; SUBCUTANEOUS at 20:41

## 2024-04-03 RX ADMIN — PROPOFOL 15 MCG/KG/MIN: 10 INJECTION, EMULSION INTRAVENOUS at 17:17

## 2024-04-03 RX ADMIN — MIDODRINE HYDROCHLORIDE 20 MG: 10 TABLET ORAL at 10:03

## 2024-04-03 RX ADMIN — CALCIUM GLUCONATE 1 G: 20 INJECTION, SOLUTION INTRAVENOUS at 19:55

## 2024-04-03 RX ADMIN — ROCURONIUM BROMIDE 100 MG: 10 INJECTION, SOLUTION INTRAVENOUS at 15:00

## 2024-04-03 ASSESSMENT — PAIN SCALES - GENERAL
PAINLEVEL_OUTOF10: 0 - NO PAIN

## 2024-04-03 ASSESSMENT — PAIN - FUNCTIONAL ASSESSMENT
PAIN_FUNCTIONAL_ASSESSMENT: CPOT (CRITICAL CARE PAIN OBSERVATION TOOL)

## 2024-04-03 NOTE — PROGRESS NOTES
ENT Trach Check Note    Subjective:  After controlling hemostasis from the trach stoma yesterday afternoon, there is no evidence of blood surrounding the trach overnight.  However, around 0500 the patient's nurse suctioned a large clot out of his posterior oropharynx.  Heparin was held at that time.  ENT was notified at 0730.  ENT resident suctioned more clots out of the posterior oropharynx.  At that time a flexible nasal scope was passed to the patient's bilateral nares, oral cavity and through his trach.  There were no obvious sources of bleeding.  The patient was seen at 1130 with ENT attending.  More clots were suctioned out the patient's oropharynx.  Flexible scope exam was reperformed.  No obvious signs of bleeding in the patient's nasal cavity, oropharynx or trachea.  However, when evaluating the patient's vocal cords there was evidence of pooling of blood.  ENT reevaluate the patient at 1 PM.   primary team became concerned there may be a source of bleeding in the hypopharynx versus tracheal stoma site.  Decision was made to proceed  with direct laryngoscopy  bronchoscopy -  please see 1500 procedure note.      Objective:  Visit Vitals  BP (!) 108/47   Pulse 98   Temp 37.6 °C (99.7 °F)   Resp 23     General: Alert, sedated, no acute distress  Resp: Breathing comfortably on vent  Head: Atraumatic, normocephalic  Oral Cavity: clots in mouth  Ears: external ears normal   Nose: external nose normal  Neck: No signs of bleeding around trach stoma.    Procedure note:  PROCEDURE NOTE for scope performed at 0730:  Nasopharyngolaryngoscopy & Bronchoscopy     For better evaluation to find the course of bleeding, a flexible fiberoptic nasopharyngolaryngoscopy was performed. Patient was correctly identified and verbal consent obtained.  Clots from the patient's oral cavity were suctioned out using Yankauer Suction. After topical anesthesia with atomized 4% Lidocaine with Afrin, the flexible scope  was introduced into  the right  naris.    The following areas were visualized:  Nasal septum, turbinates, nasopharynx, oropharynx, hypopharynx, soft palate, base of tongue, epiglottis, and larynx.    These structures were found to be normal with the following notable findings:     -   There was evidence of a small clot above the level of the vocal cords.  This was suctioned out.  - There is evidence of was to mucosa from previous clots in the pharynx and hypopharynx.  - There is no evidence of a specific source of bleeding.  Slow oozing was noted below the vocal cords.    After the nasopharyngolaryngoscopy was performed, The bronchoscopy adapter was attached to the patient's trach.  The flexible scope was inserted through the  Trach down to the level of the ariadna.  there was some evidence of tracheal irritation most likely due to suctioning.  There was no evidence of a Bleeding source.      PROCEDURE NOTE for scope performed at 1130:  Nasopharyngolaryngoscopy & Bronchoscopy     For better evaluation to find the course of bleeding, a flexible fiberoptic nasopharyngolaryngoscopy was performed. Patient was correctly identified and verbal consent obtained. Clots from the patient's oral cavity were suctioned out using Yankauer Suction. After topical anesthesia with atomized 4% Lidocaine with Afrin, the flexible scope was introduced into the   Right  naris.    The following areas were visualized:  Nasal septum, turbinates, nasopharynx, oropharynx, hypopharynx, soft palate, base of tongue, epiglottis, and larynx.    These structures were found to be normal with the following notable findings:     - the findings were the same as  the previous procedure except for there was  clotting noted below the level of vocal cords sitting on the trach balloon.    After the nasopharyngolaryngoscopy was performed, The bronchoscopy adapter was attached to the patient's trach.  The flexible scope was inserted through the  Trach down to the level of the  ariadna.  there was some evidence of tracheal irritation most likely due to suctioning.  There was no evidence of a Bleeding source.    Procedure NOTE for  direct laryngoscopy and bronchoscopy at 1500:   Direct laryngoscopy, bronchoscopy, Bronchoalveolar Lavage    After consent was obtained from the patient's wife, respiratory therapy,  the patient's ICU nurse and anesthesia was notified.   A timeout was performed. Anesthesia sedated and paralyzed the patient.   A thorough examination of the patient's mouth including upper and lower alveolar ridges, buccal mucosal,  tongue, floor mouth, lips,  retromolar trigone, hard palate and soft palate was performed.  There is no evidence of any bleeding source.    The dedo laryngoscope was then introduced and the oropharynx, hypopharynx, soft palate, base of tongue, epiglottis, and larynx were all visualized and found to be free of lesions.   Clots were noted in the subglottic region. The clots were suctioned out for better visualization.   There were no obvious sources of bleeding    Next the flexible bronchosope was inserted through the  Dedo laryngoscope to better visualize the  oropharynx, hypopharynx,   In subglottically.  There were no obvious sources of bleeding.  There was evidence of oozing subglottically that was causing blood to slowly pull over the trach balloon.  At this time Afrin was sprayed through the bronchoscope directly to the area of oozing.  Time was given for Afrin to take effect.  It was then suctioned from the airway.    Next the flexible   Bronchoscope was inserted through the trach down to the level of the ariadna.  There is no evidence of a bleeding source.  At this time the primary team requested a BAL be performed.  Saline irrigation was sprayed in the right mainstem bronchus through the bronchoscope.  A specimen container was attached to the end of the bronchoscope.  Suction was then used to remove the fluid from the right main stem bronchus and   Its tributaries.  this concluded the procedure.  The patient tolerated the procedure well.  Care was turned over to the anesthesia and primary team.    Dr. Arvizu was present for the critical portions of the procedure.    Assessment:  Jason is s/p POD2 from open tracheostomy by Dr. Perkins on 4/1/24.  After the patient's anticoagulation was restarted this yesterday, he began to experience clots in his oropharynx.  A direct laryngoscope and bronchoscope was performed by ENT and attempt to visualize a potential source of bleeding.  No source was identified. The accumulation of blood appeared to be 2/2 to a slow ooze from the trachea site.   Primary team and RT was at bedside during procedure. A multi-team discussion was held and it was determined there was no indication to return to the operating at this time.    Plan:  - Primary team to use nebulized TXA as needed  -  Primary team to hold anticoagulation as long as medically able  - ENT to follow    UPDATED PLAN: 4/3/24 1800  - Scheduled TXA q6h      Dennis Katz DO PGY-2  Otolaryngology - Head & Neck Surgery  [facility]     ENT Consult pager: [phone]  Please Page if Urgent

## 2024-04-03 NOTE — PROGRESS NOTES
Spiritual Care Visit      tried to visit, but the patient was sleeping and no family was present in the room and didn't appear to be present in the family room. Will attempt to follow-up later.    Rev. Socrates Velasquez, Supportive Oncology

## 2024-04-03 NOTE — PROGRESS NOTES
Physical Therapy                 Therapy Communication Note    Patient Name: Jason Hollins  MRN: [id number]  Today's Date: 4/3/2024   Time: 0808    Discipline: Physical Therapy    Missed Visit Reason: Missed Visit Reason:  (Per RN, pt with increased bleeding around trach.  Will hold until medically stable.)    Missed Time: Attempt    Comment:

## 2024-04-03 NOTE — PROGRESS NOTES
"Nutrition Follow Up Assessment:   Nutrition Assessment    Reason for Assessment: Dietitian discretion (follow up eval)    4/01  trach placed in the OR by ENT  4/02  CVVH discontinued  4/03  s/p SLED with 3L off ; oral bleeding this morning > bronch this afternoon    Nutrition History:  Food and Nutrient History: TF initiated 3/27 and goal reached by 3/29  (Pivot 1.5 @ 45mls/hr). Off for trach 4/01, back to goal by 4/02. Unable to enter room as other providers at bedside.  Nutrisource Fiber pkts added TID 3/31.  Food Allergies/Intolerances:  None  GI Symptoms: Diarrhea with FMS in place since 3/31       Anthropometrics:  Height: 177.8 cm (5' 10\")   Weight: 125 kg (275 lb 5.7 oz)   BMI (Calculated): 39.51  IBW/kg (Dietitian Calculated): 75.5 kg  Percent of IBW: 165 %       Weight History:   Date/Time Weight Dosing Weight   04/02/24 2326 125 kg (275 lb 5.7 oz) --   03/31/24 0600 125 kg (275 lb 5.7 oz) --   03/30/24 2000 -- 101 kg (222 lb 10.6 oz)   03/30/24 0600 129 kg (283 lb 15.2 oz) --   03/29/24 2000 -- 101 kg (222 lb 10.6 oz)   03/29/24 0600 129 kg (284 lb 9.8 oz) --   03/28/24 0600 133 kg (292 lb 8.8 oz) --   03/25/24 0600 140 kg (308 lb 13.8 oz) --   03/24/24 0600 137 kg (302 lb 4 oz) --   03/22/24 0800 -- 101 kg (222 lb 10.6 oz)   03/22/24 0600 129 kg (284 lb 2.8 oz) --     Weight Change %:  Weight History / % Weight Change: down 4 kgs in ~2 weeks - suspect fluid losses    Nutrition Focused Physical Exam Findings:  defer: unable to enter room  Edema:  Edema: +4 severe, +3 moderate, +2 mild  Edema Location: +4 generealized edema wiht +3 RUE and +2 LUE & BLE  Physical Findings:  Skin: Positive (wound vac to right LE)    Nutrition Significant Labs:  CBC Trend:   Results from last 7 days   Lab Units 04/03/24  0450 04/03/24  0100 04/02/24  1313 04/02/24  0204   WBC AUTO x10*3/uL 4.0* 4.3* 8.1 9.5   RBC AUTO x10*6/uL 2.74* 2.59* 2.73* 2.69*   HEMOGLOBIN g/dL 8.1* 7.7* 8.1* 7.9*   HEMATOCRIT % 22.6* 21.6* 22.6* 23.5* "   MCV fL 83 83 83 87   PLATELETS AUTO x10*3/uL 44* 49* 68* 58*    , A1C:  Lab Results   Component Value Date    HGBA1C 7.5 (H) 02/28/2024   , BG POCT trend:   Results from last 7 days   Lab Units 04/03/24  1132 04/03/24  0842 04/03/24  0603 04/03/24  0455 04/03/24  0339   POCT GLUCOSE mg/dL 174* 142* 129* 127* 130*    , Liver Function Trend:   Results from last 7 days   Lab Units 04/03/24  0100 04/02/24  0025 04/01/24  0308 03/29/24  0641   ALK PHOS U/L 202* 167* 139* 116   AST U/L 126* 134* 120* 119*   ALT U/L 131* 155* 177* 361*   BILIRUBIN TOTAL mg/dL 23.5* 20.1* 19.0* 16.2*    , Renal Lab Trend:   Results from last 7 days   Lab Units 04/03/24  0820 04/03/24  0100 04/02/24  0025 04/01/24  1541   POTASSIUM mmol/L 3.8 4.0 4.7 4.7   PHOSPHORUS mg/dL 1.4* 1.7* 3.3 3.2   SODIUM mmol/L 135* 137 134* 134*   MAGNESIUM mg/dL 2.00 2.19 2.31 2.35   EGFR mL/min/1.73m*2 74 40* 35* 41*   BUN mg/dL 27* 55* 67* 60*   CREATININE mg/dL 1.12 1.86* 2.10* 1.85*    , Vit D:   Lab Results   Component Value Date    VITD25 55 12/30/2022        Nutrition Specific Medications:  Scheduled medications  acetylcysteine, 4 mL, endotracheal, Once  esomeprazole, 40 mg, nasogastric tube, Daily before breakfast  fludrocortisone, 0.1 mg, nasogastric tube, q12h  hydrocortisone sodium succinate, 50 mg, intravenous, q12h  insulin glargine, 10 Units, subcutaneous, q12h  insulin lispro, 0-10 Units, subcutaneous, q4h  midodrine, 20 mg, orogastric tube, q8h  phytonadione, 10 mg, intravenous, Once  potassium phosphate, 21 mmol, intravenous, Once  tranexamic acid, 500 mg, nebulization, q8h      Continuous medications  [Held by provider] heparin, 0-4,000 Units/hr, Last Rate: Stopped (04/03/24 0425)  lactated Ringer's, 5 mL/hr, Last Rate: 5 mL/hr (04/03/24 0400)  norepinephrine, 0.01-0.2 mcg/kg/min (Dosing Weight), Last Rate: 0.12 mcg/kg/min (04/03/24 1306)  propofol, 5-50 mcg/kg/min, Last Rate: 15 mcg/kg/min (04/03/24 1306)      PRN medications  PRN  medications: alteplase, calcium gluconate, calcium gluconate, dextrose **OR** glucagon, magnesium sulfate, magnesium sulfate, oxygen      I/O:   Last BM Date: 04/02/24; Stool Appearance: Watery, Mucous (04/03/24 0800)    600mls out 4/02 per flow sheets    Dietary Orders (From admission, onward)       Start     Ordered    04/02/24 0720  Enteral feeding with NPO Pivot 1.5; NG (nasogastric tube); 45  Diet effective now        Question Answer Comment   Tube feeding formula: Pivot 1.5    Tube feeding additive: Pro-Stat AWC    Tube feeding additive frequency: Once a day    Feeding route: NG (nasogastric tube)    Tube feeding continuous rate (mL/hr): 45        04/02/24 0719    03/31/24 1027  Oral nutritional supplements  Until discontinued        Question Answer Comment   Deliver with All meals    Select supplement: Nutrasource Fiber        03/31/24 1026                     Estimated Needs:   Total Energy Estimated Needs (kCal):  (6539-1080)  Method for Estimating Needs: hypocaloric feeding (11-14kcal/kg) using 129kg weight from 3/22  Total Protein Estimated Needs (g):  (113-151)  Method for Estimating Needs: 1.5-2 g/kg  Total Fluid Estimated Needs (mL):  (per team)           Nutrition Diagnosis   Malnutrition Diagnosis  Patient has Malnutrition Diagnosis: Yes  Diagnosis Status: Ongoing  Malnutrition Diagnosis: Moderate malnutrition related to acute disease or injury  As Evidenced by: <50% of estimated energy requirements for >5days (no intake x 6 days), +2 edema, current critically ill state.  Additional Assessment Information: TF's reached rec'd goal rate 3/29            Nutrition Interventions/Recommendations         Nutrition Prescription:  Individualized Nutrition Prescription Provided for : continue current TF regimen = Pivot 1.5 goal @ 45mls/hr + one Prostat AWC daily        Nutrition Interventions:   Enteral Intake: Other (Comment)  Goal: TF @ goal with protein modular daily provides 1080ml, 1720 kcal, 118g  protein         Nutrition Monitoring and Evaluation   Food/Nutrient Related History Monitoring  Monitoring and Evaluation Plan: Enteral and parenteral nutrition intake  Enteral and Parenteral Nutrition Intake: Enteral nutrition intake  Criteria: >75% est nutrient needs met via TF's  Additional Plans: continue fiber for stool bulking         Biochemical Data, Medical Tests and Procedures  Monitoring and Evaluation Plan: Electrolyte/renal panel  Criteria: lytes WNL  Criteria: ICU BG control 140-180 mg/dL            Time Spent/Follow-up Reminder:   Time Spent (min): 45 minutes  Last Date of Nutrition Visit: 04/03/24  Nutrition Follow-Up Needed?: Dietitian to reassess per policy  Follow up Comment: trach > cont TF's

## 2024-04-03 NOTE — CONSULTS
"Vancomycin Dosing by Pharmacy- INITIAL    Jason Hollins is a 63 y.o. year old male who Pharmacy has been consulted for vancomycin dosing for pneumonia with c/f sepsis. Based on the patient's indication and renal status this patient will be dosed based on a goal trough/random level of 15-20.     Patient is on SLED, last session on 4/2 and also ordered for today 4/3. Was previously on CVVH.     Visit Vitals  /57   Pulse 106   Temp 38.7 °C (101.7 °F)   Resp 20        Lab Results   Component Value Date    CREATININE 1.12 04/03/2024    CREATININE 1.86 (H) 04/03/2024    CREATININE 2.10 (H) 04/02/2024    CREATININE 1.85 (H) 04/01/2024        Patient weight is No results found for: \"PTWEIGHT\"    No results found for: \"CULTURE\"     I/O last 3 completed shifts:  In: 2911.8 (23.3 mL/kg) [I.V.:651.8 (5.2 mL/kg); Blood:700; NG/GT:1360; IV Piggyback:200]  Out: 5043 (40.4 mL/kg) [Drains:75; Other:4368; Stool:600]  Weight: 124.9 kg   [unfilled]    Lab Results   Component Value Date    PATIENTTEMP 37.0 04/03/2024    PATIENTTEMP 37.0 04/03/2024    PATIENTTEMP 37.0 04/03/2024          Assessment/Plan     Patient will be given a loading dose of 2000 mg.  Will initiate vancomycin maintenance based on level as patient is on SLED.   Follow-up level will be ordered on 4/4 @1000 unless clinically indicated sooner.  Will continue to monitor renal function daily while on vancomycin and order serum creatinine at least every 48 hours if not already ordered.  Follow for continued vancomycin needs, clinical response, and signs/symptoms of toxicity.       Debbie Pena, PharmD       "

## 2024-04-03 NOTE — PROGRESS NOTES
Jason Hollins is a 63 y.o. male on day 29 of admission presenting with Soft tissue sarcoma (CMS/HCC).      Subjective   4/3: S/p SLED yesterday with 3L fluid removed. Seen resting in bed. Trach to vent minimal FiO2 needs. On levo 0.0 and vaso. No UO.         Objective          Vitals 24HR  Heart Rate:  []   Temp:  [36.2 °C (97.2 °F)-39.1 °C (102.4 °F)]   Resp:  [13-33]   BP: (116-134)/(51-57)   Weight:  [125 kg (275 lb 5.7 oz)]   SpO2:  [89 %-99 %]         Intake/Output last 3 Shifts:    Intake/Output Summary (Last 24 hours) at 4/3/2024 1658  Last data filed at 4/3/2024 1430  Gross per 24 hour   Intake 1325.63 ml   Output 3975 ml   Net -2649.37 ml         Physical Exam  General appearance: intubated, trached  Eyes: non-icteric  Skin: no apparent rash  Heart: RRR  Lungs: good airflow throughout,FiO2 50% on vent  Abdomen: soft, non-distended   Extremities: 1+ B/l leg edema  : daniels in place   Neuro: follows commands by squeezing hand  ACCESS: -  right I J trialysis    Current Facility-Administered Medications:     acetylcysteine (Mucomyst) 100 mg/mL (10 %) nebulizer solution 4 mL, 4 mL, endotracheal, Once, PHILLIP Alejo-CNP    alteplase (Cathflo Activase) injection 2 mg, 2 mg, intra-catheter, PRN, PHILLIP Alejo-CNP    calcium gluconate in NS IV 1 g, 1 g, intravenous, q6h PRN, PHILLIP Alejo-CNP, Stopped at 03/31/24 0634    calcium gluconate in NS IV 2 g, 2 g, intravenous, q6h PRN, PHILLIP Alejo-CNP, Last Rate: 100 mL/hr at 03/26/24 1458, 2 g at 03/26/24 1458    dextrose 50 % injection 25 g, 25 g, intravenous, q15 min PRN **OR** glucagon (Glucagen) injection 1 mg, 1 mg, intramuscular, q15 min PRN, RAQUEL Alejo    esomeprazole (NexIUM) suspension 40 mg, 40 mg, nasogastric tube, Daily before breakfast, PHILLIP Alejo-CNP, 40 mg at 04/03/24 0700    fludrocortisone (Florinef) tablet 0.1 mg, 0.1 mg, nasogastric tube, q12h, PHILLIP Alejo-TUNG, 0.1 mg  at 04/03/24 0102    [Held by provider] heparin 25,000 Units in dextrose 5% 250 mL (100 Units/mL) infusion (premix), 0-4,000 Units/hr, intravenous, Continuous, Remy Peter APRN-CNP, Stopped at 04/03/24 0425    hydrocortisone sod succ (PF) (Solu-CORTEF) injection 50 mg, 50 mg, intravenous, q12h, Remy Peter, APRN-CNP, 50 mg at 04/03/24 1636    insulin glargine (Lantus) injection 10 Units, 10 Units, subcutaneous, q12h, Remy Beverlyetler, APRN-CNP, 10 Units at 04/03/24 1036    insulin lispro (HumaLOG) injection 0-10 Units, 0-10 Units, subcutaneous, q4h, Remy Peter, APRN-CNP, 2 Units at 04/03/24 1151    lactated Ringer's infusion, 5 mL/hr, intravenous, Continuous, Remy Peter, APRN-CNP, Last Rate: 5 mL/hr at 04/03/24 0400, 5 mL/hr at 04/03/24 0400    magnesium sulfate IV 2 g, 2 g, intravenous, q6h PRN, Remy Peter, APRN-CNP, Stopped at 03/24/24 1726    magnesium sulfate IV 4 g, 4 g, intravenous, q6h PRN, Remy Peter, APRN-CNP, Stopped at 03/21/24 0706    midodrine (Proamatine) tablet 20 mg, 20 mg, orogastric tube, q8h, Remy Kenneyler, APRN-CNP, 20 mg at 04/03/24 1003    norepinephrine (Levophed) 8 mg in dextrose 5% 250 mL (0.032 mg/mL) infusion (premix), 0.01-0.2 mcg/kg/min (Dosing Weight), intravenous, Continuous, Remy Peter, APRN-CNP, Last Rate: 22.7 mL/hr at 04/03/24 1306, 0.12 mcg/kg/min at 04/03/24 1306    oxygen (O2) therapy, , inhalation, Continuous PRN - O2/gases, Remy TROY BeverlyRome, APRN-CNP, Rate Change at 04/03/24 1545    phenylephrine HCl in 0.9% NaCl syringe  - Omnicell Override Pull, , , ,     phytonadione (Vitamin K) 10 mg in dextrose 5 % in water (D5W) 50 mL IV, 10 mg, intravenous, Once, Alesha Mckeon PA-C    piperacillin-tazobactam-dextrose (Zosyn) IV 3.375 g, 3.375 g, intravenous, q6h, RAQUEL Alejo    potassium phosphates 21 mmol in dextrose 5 % in water (D5W) 250 mL IV, 21 mmol, intravenous, Once, RAQUEL Alejo, Last Rate: 42.8  mL/hr at 04/03/24 1349, 21 mmol at 04/03/24 1349    propofol (Diprivan) infusion, 5-50 mcg/kg/min, intravenous, Continuous, RAQUEL Alejo, Last Rate: 11.25 mL/hr at 04/03/24 1306, 15 mcg/kg/min at 04/03/24 1306    rocuronium (ZeMuron) injection  - Omnicell Override Pull, , , ,     tranexamic acid (Cyklokapron) 500 mg in sodium chloride 0.9% 8 mL inhalation, 500 mg, nebulization, q8h, RAQUEL Alejo, 500 mg at 04/03/24 1545    vancomycin (Vancocin) 2000 mg/500 ml in D5W 500 mL IV piggyback 2,000 mg, 2,000 mg, intravenous, Once, Castro H Jean, PharmD    vancomycin (Vancocin) pharmacy to dose - pharmacy monitoring, , miscellaneous, Daily PRN, RAQUEL Alejo      Assessment/Plan   Jason Hollins is a 63 y.o. 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 Hawkins and Dr. Smith on 3/5/24. On 3/20, he developed massive PE and was given TPA, taken to OR in setting of increased swelling at flap site- hematoma for exploration and evacuation. Nephrology following for RUTH.    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 Yolanda Duffy MD  Nephrology Fellow PGY 5  Contact via secure chat  Nephrology Pager # [phone] ([phone])

## 2024-04-03 NOTE — PROGRESS NOTES
Jason Hollins is a 63 y.o. male on day 29 of admission presenting with Soft tissue sarcoma (CMS/HCC).    Subjective   Patient remains on precedex this am 2/2 tachypnea and increased WOB. 3L fluid removal with SLED overnight. Repeat episode of bleeding early this am and heparin infusion was held.     Objective     Physical Exam  Vitals reviewed.   Constitutional:       Appearance: He is ill-appearing and diaphoretic.      Comments: Trached.   HENT:      Head:      Comments: Edematous face/head/neck     Nose:      Comments: Dobhoff in R nare bridled in place at 65cm.     Mouth/Throat:      Mouth: Mucous membranes are dry.   Eyes:      General: Scleral icterus present.      Pupils: Pupils are equal, round, and reactive to light.      Comments: Conjunctival edema. Subconjunctival hemorrhage.   Neck:      Comments: Trach midline. 6-0 proximal Shiley XLT, dressing with bloody drainage. RIJ trialysis line in place, dressing c/d/i.   Cardiovascular:      Rate and Rhythm: Normal rate and regular rhythm.      Pulses:           Radial pulses are 1+ on the right side and 1+ on the left side.        Dorsalis pedis pulses are 1+ on the right side and 1+ on the left side.      Heart sounds: Normal heart sounds.   Pulmonary:      Comments: Mechanically ventilated via trach. Labored breathing. Diminished breath sounds bilaterally. Equal chest rise. Bloody secretions via trach. Current vent settings SIMV 40%, 550, 18, +10, 5 PS.  Abdominal:      General: Abdomen is protuberant. Bowel sounds are normal.      Palpations: Abdomen is soft.      Comments: Obese abdomen.   Genitourinary:     Comments: Penile and scrotal edema. Dignicare in place with liquid brown stool.  Musculoskeletal:      Cervical back: Neck supple.      Comments: Generalized weakness.   Skin:     General: Skin is warm.      Coloration: Skin is jaundiced.      Comments: Anasarca. Right flap site with wound VAC, no output. ANDERSON x 3 all with serosanguinous output.  "  Neurological:      GCS: GCS eye subscore is 4. GCS verbal subscore is 1. GCS motor subscore is 6.      Comments: Trached. Patient opening eyes spontaneously and following simple commands. Weak bilateral hand grasps and wiggles toes in left foot. Unable to move RLE.      Psychiatric:      Comments: HANNAH.     Last Recorded Vitals  Blood pressure 134/57, pulse 106, temperature 38.7 °C (101.7 °F), resp. rate 20, height 1.778 m (5' 10\"), weight 125 kg (275 lb 5.7 oz), SpO2 94 %.    VS over last 24h  Heart Rate:  []   Temp:  [36.2 °C (97.2 °F)-39.1 °C (102.4 °F)]   Resp:  [13-33]   BP: (116-134)/(51-57)   Weight:  [125 kg (275 lb 5.7 oz)]   SpO2:  [89 %-99 %]      Scheduled medications  acetylcysteine, 4 mL, endotracheal, Once  esomeprazole, 40 mg, nasogastric tube, Daily before breakfast  fludrocortisone, 0.1 mg, nasogastric tube, q12h  hydrocortisone sodium succinate, 50 mg, intravenous, q12h  insulin glargine, 10 Units, subcutaneous, q12h  insulin lispro, 0-10 Units, subcutaneous, q4h  midodrine, 20 mg, orogastric tube, q8h  phenylephrine HCl in 0.9% NaCl, , ,   phytonadione, 10 mg, intravenous, Once  piperacillin-tazobactam, 3.375 g, intravenous, q6h  potassium phosphate, 21 mmol, intravenous, Once  propofol, 200 mg, intravenous, Once  rocuronium, , ,   rocuronium, , ,   rocuronium, 100 mg, intravenous, Once  tranexamic acid, 500 mg, nebulization, q8h  vancomycin, 2,000 mg, intravenous, Once      Continuous medications  [Held by provider] heparin, 0-4,000 Units/hr, Last Rate: Stopped (04/03/24 0425)  lactated Ringer's, 5 mL/hr, Last Rate: 5 mL/hr (04/03/24 0400)  norepinephrine, 0.01-0.2 mcg/kg/min (Dosing Weight), Last Rate: 0.12 mcg/kg/min (04/03/24 1306)  propofol, 5-50 mcg/kg/min, Last Rate: 15 mcg/kg/min (04/03/24 1306)      PRN medications  PRN medications: alteplase, calcium gluconate, calcium gluconate, dextrose **OR** glucagon, magnesium sulfate, magnesium sulfate, oxygen, phenylephrine HCl in 0.9% " NaCl, rocuronium, rocuronium, vancomycin      Results for orders placed or performed during the hospital encounter of 03/05/24 (from the past 24 hour(s))   POCT GLUCOSE   Result Value Ref Range    POCT Glucose 156 (H) 74 - 99 mg/dL   POCT GLUCOSE   Result Value Ref Range    POCT Glucose 154 (H) 74 - 99 mg/dL   POCT GLUCOSE   Result Value Ref Range    POCT Glucose 145 (H) 74 - 99 mg/dL   Heparin Assay, UFH   Result Value Ref Range    Heparin Unfractionated 0.1 See Comment Below for Therapeutic Ranges IU/mL   POCT GLUCOSE   Result Value Ref Range    POCT Glucose 154 (H) 74 - 99 mg/dL   POCT GLUCOSE   Result Value Ref Range    POCT Glucose 144 (H) 74 - 99 mg/dL   Type and Screen   Result Value Ref Range    ABO TYPE O     Rh TYPE POS     ANTIBODY SCREEN NEG    CBC   Result Value Ref Range    WBC 4.3 (L) 4.4 - 11.3 x10*3/uL    nRBC 0.9 (H) 0.0 - 0.0 /100 WBCs    RBC 2.59 (L) 4.50 - 5.90 x10*6/uL    Hemoglobin 7.7 (L) 13.5 - 17.5 g/dL    Hematocrit 21.6 (L) 41.0 - 52.0 %    MCV 83 80 - 100 fL    MCH 29.7 26.0 - 34.0 pg    MCHC 35.6 32.0 - 36.0 g/dL    RDW 20.4 (H) 11.5 - 14.5 %    Platelets 49 (L) 150 - 450 x10*3/uL   Coagulation Screen   Result Value Ref Range    Protime 28.8 (H) 9.8 - 12.8 seconds    INR 2.5 (H) 0.9 - 1.1    aPTT 115 (HH) 27 - 38 seconds   Heparin Assay, UFH   Result Value Ref Range    Heparin Unfractionated 0.4 See Comment Below for Therapeutic Ranges IU/mL   Magnesium   Result Value Ref Range    Magnesium 2.19 1.60 - 2.40 mg/dL   Renal Function Panel   Result Value Ref Range    Glucose 119 (H) 74 - 99 mg/dL    Sodium 137 136 - 145 mmol/L    Potassium 4.0 3.5 - 5.3 mmol/L    Chloride 100 98 - 107 mmol/L    Bicarbonate 28 21 - 32 mmol/L    Anion Gap 13 10 - 20 mmol/L    Urea Nitrogen 55 (H) 6 - 23 mg/dL    Creatinine 1.86 (H) 0.50 - 1.30 mg/dL    eGFR 40 (L) >60 mL/min/1.73m*2    Calcium 8.6 8.6 - 10.6 mg/dL    Phosphorus 1.7 (L) 2.5 - 4.9 mg/dL    Albumin 3.6 3.4 - 5.0 g/dL   Fibrinogen   Result Value  Ref Range    Fibrinogen 234 200 - 400 mg/dL   Hepatic function panel   Result Value Ref Range    Albumin 3.6 3.4 - 5.0 g/dL    Bilirubin, Total 23.5 (H) 0.0 - 1.2 mg/dL    Bilirubin, Direct 16.2 (H) 0.0 - 0.3 mg/dL    Alkaline Phosphatase 202 (H) 33 - 136 U/L     (H) 10 - 52 U/L     (H) 9 - 39 U/L    Total Protein 5.5 (L) 6.4 - 8.2 g/dL   CALCIUM, IONIZED   Result Value Ref Range    POCT Calcium, Ionized 1.17 1.1 - 1.33 mmol/L   POCT GLUCOSE   Result Value Ref Range    POCT Glucose 118 (H) 74 - 99 mg/dL   POCT GLUCOSE   Result Value Ref Range    POCT Glucose 130 (H) 74 - 99 mg/dL   CBC   Result Value Ref Range    WBC 4.0 (L) 4.4 - 11.3 x10*3/uL    nRBC 1.8 (H) 0.0 - 0.0 /100 WBCs    RBC 2.74 (L) 4.50 - 5.90 x10*6/uL    Hemoglobin 8.1 (L) 13.5 - 17.5 g/dL    Hematocrit 22.6 (L) 41.0 - 52.0 %    MCV 83 80 - 100 fL    MCH 29.6 26.0 - 34.0 pg    MCHC 35.8 32.0 - 36.0 g/dL    RDW 20.8 (H) 11.5 - 14.5 %    Platelets 44 (L) 150 - 450 x10*3/uL   POCT GLUCOSE   Result Value Ref Range    POCT Glucose 127 (H) 74 - 99 mg/dL   POCT GLUCOSE   Result Value Ref Range    POCT Glucose 129 (H) 74 - 99 mg/dL   Blood Gas Arterial Full Panel   Result Value Ref Range    POCT pH, Arterial 7.48 (H) 7.38 - 7.42 pH    POCT pCO2, Arterial 37 (L) 38 - 42 mm Hg    POCT pO2, Arterial 77 (L) 85 - 95 mm Hg    POCT SO2, Arterial 98 94 - 100 %    POCT Oxy Hemoglobin, Arterial 95.6 94.0 - 98.0 %    POCT Hematocrit Calculated, Arterial 24.0 (L) 41.0 - 52.0 %    POCT Sodium, Arterial 131 (L) 136 - 145 mmol/L    POCT Potassium, Arterial 3.6 3.5 - 5.3 mmol/L    POCT Chloride, Arterial 102 98 - 107 mmol/L    POCT Ionized Calcium, Arterial 1.12 1.10 - 1.33 mmol/L    POCT Glucose, Arterial 132 (H) 74 - 99 mg/dL    POCT Lactate, Arterial 1.3 0.4 - 2.0 mmol/L    POCT Base Excess, Arterial 3.9 (H) -2.0 - 3.0 mmol/L    POCT HCO3 Calculated, Arterial 27.6 (H) 22.0 - 26.0 mmol/L    POCT Hemoglobin, Arterial 8.1 (L) 13.5 - 17.5 g/dL    POCT Anion Gap,  Arterial 5 (L) 10 - 25 mmo/L    Patient Temperature 37.0 degrees Celsius    FiO2 50 %   Renal Function Panel   Result Value Ref Range    Glucose 140 (H) 74 - 99 mg/dL    Sodium 135 (L) 136 - 145 mmol/L    Potassium 3.8 3.5 - 5.3 mmol/L    Chloride 97 (L) 98 - 107 mmol/L    Bicarbonate 23 21 - 32 mmol/L    Anion Gap 19 10 - 20 mmol/L    Urea Nitrogen 27 (H) 6 - 23 mg/dL    Creatinine 1.12 0.50 - 1.30 mg/dL    eGFR 74 >60 mL/min/1.73m*2    Calcium 8.4 (L) 8.6 - 10.6 mg/dL    Phosphorus 1.4 (L) 2.5 - 4.9 mg/dL    Albumin 3.6 3.4 - 5.0 g/dL   Magnesium   Result Value Ref Range    Magnesium 2.00 1.60 - 2.40 mg/dL   POCT GLUCOSE   Result Value Ref Range    POCT Glucose 142 (H) 74 - 99 mg/dL   POCT GLUCOSE   Result Value Ref Range    POCT Glucose 174 (H) 74 - 99 mg/dL   Blood Gas Arterial Full Panel   Result Value Ref Range    POCT pH, Arterial 7.11 (LL) 7.38 - 7.42 pH    POCT pCO2, Arterial 80 (HH) 38 - 42 mm Hg    POCT pO2, Arterial 83 (L) 85 - 95 mm Hg    POCT SO2, Arterial 96 94 - 100 %    POCT Oxy Hemoglobin, Arterial 93.6 (L) 94.0 - 98.0 %    POCT Hematocrit Calculated, Arterial 23.0 (L) 41.0 - 52.0 %    POCT Sodium, Arterial 133 (L) 136 - 145 mmol/L    POCT Potassium, Arterial 4.2 3.5 - 5.3 mmol/L    POCT Chloride, Arterial 102 98 - 107 mmol/L    POCT Ionized Calcium, Arterial 1.11 1.10 - 1.33 mmol/L    POCT Glucose, Arterial 187 (H) 74 - 99 mg/dL    POCT Lactate, Arterial 1.9 0.4 - 2.0 mmol/L    POCT Base Excess, Arterial -4.3 (L) -2.0 - 3.0 mmol/L    POCT HCO3 Calculated, Arterial 25.4 22.0 - 26.0 mmol/L    POCT Hemoglobin, Arterial 7.7 (L) 13.5 - 17.5 g/dL    POCT Anion Gap, Arterial 10 10 - 25 mmo/L    Patient Temperature 37.0 degrees Celsius    FiO2 100 %   Blood Gas Arterial Full Panel   Result Value Ref Range    POCT pH, Arterial 7.28 (L) 7.38 - 7.42 pH    POCT pCO2, Arterial 49 (H) 38 - 42 mm Hg    POCT pO2, Arterial 321 (H) 85 - 95 mm Hg    POCT SO2, Arterial 100 94 - 100 %    POCT Oxy Hemoglobin, Arterial  97.4 94.0 - 98.0 %    POCT Hematocrit Calculated, Arterial 23.0 (L) 41.0 - 52.0 %    POCT Sodium, Arterial 132 (L) 136 - 145 mmol/L    POCT Potassium, Arterial 4.2 3.5 - 5.3 mmol/L    POCT Chloride, Arterial 102 98 - 107 mmol/L    POCT Ionized Calcium, Arterial 1.09 (L) 1.10 - 1.33 mmol/L    POCT Glucose, Arterial 178 (H) 74 - 99 mg/dL    POCT Lactate, Arterial 1.9 0.4 - 2.0 mmol/L    POCT Base Excess, Arterial -3.6 (L) -2.0 - 3.0 mmol/L    POCT HCO3 Calculated, Arterial 23.0 22.0 - 26.0 mmol/L    POCT Hemoglobin, Arterial 7.7 (L) 13.5 - 17.5 g/dL    POCT Anion Gap, Arterial 11 10 - 25 mmo/L    Patient Temperature 37.0 degrees Celsius    FiO2 100 %   Prepare Platelets: 1 Units   Result Value Ref Range    PRODUCT CODE [id number]     Unit Number [id number]     Unit ABO O     Unit RH POS     Dispense Status IS     Blood Expiration Date April 04, 2024 23:59 EDT     PRODUCT BLOOD TYPE 5100     UNIT VOLUME 342         Assessment/Plan   Jason Hollins is a 63 y.o. 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 Hawkins and Dr. Smith on 3/5/24.  Post operative course was uncomplicated and patient was on RNF until 3/20 for prolonged bed rest to avoid hip flexion to maintain flap integrity.  Patient was on prophylactic lovenox while on bedrest.     3/20: Cardiopulmonary arrest s/p CPR with ROSC after TPA, overnight started on heparin, epo, increased pressor requirements, increased swelling at flap site.     3/21: Hemorrhagic shock, MTP. Firm and large right flap site. Return to OR s/p Exploration of right thigh wound, Evacuation of hematoma. Suture ligation of multiple bleeding vessel and several points in gluteal area.     4/1: s/p Trach    Plan:  NEURO: History of myxoid chondrosarcoma s/p wide resection sarcoma, sciatic nerve  neurolysis of right lower extremity with right gluteal reconstruction, pedicle ALT, vastus lateralus flap, pedicle gluteal and perisformis flap with Dr. Hawkins and Dr. Smith on 3/5/24 s/p cardiopulmonary arrest with ROSC 3/20. Documented to have followed commands off sedation. CT head 3/22 without acute process. Weaned off cisatracurium and propofol overnight 3/23-3/24. Ketamine weaned off 3/25 and Fentanyl weaned off 3/26 am. Repeat CT Head 3/26 without acute process. MRI Brain 3/30, negative for cerebral infarction or hemorrhage. Neuro exam - Patient opening eyes to verbal stimulation and following simple commands. Weak bilateral hand grasps and wiggles toes in left foot. Unable to move RLE. Off all sedation.  - ongoing neuro and pain assessments  - switch precedex to propofol, titrate to RASS goal 0 to -2  - PT/OT -> PROM  - no need for soft wrist restraints at this time -> continue to reassess     CV: History of HTN, HLD. Acute cardiopulmonary arrest 2/2 to possible massive PE vs massive STEMI, received multiple rounds of CPR and one defibrillation.  Sustained ROSC achieved after TPA bolus. On high dose levophed, epinephrine, vaso, angioT2. Plan on 3/21 was for ECMO which was aborted secondary to large hemhorrge at right flap site. Had MTP and taken OR with 5L evacuated. ECHO 3/21: Normal LV, Mod enlarged RV, slight suggestion of a Townsend's sign / RV strain, low normal RV function. ECHO 3/22 with hyperdynamic LV. New onset Afib with RVR overnight 3/22-3/23, dosed with 150mg amio x2, started infusion at 1mg/min thereafter. AT2 weaned off. Amio infusion discontinued 3/25. Lactate downtrending. Vaso and epi weaned off. Remains in NSR. iEpo weaned off 3/27. Repeat TTE 3/29 and 4/2 essentially unchanged. Levo resumed 4/2 evening to continue aggressive fluid removal.  - continuous EKG/abp/cvp monitoring  - Goal map range 65-90  - wean levophed as tolerated  - Continue midodrine 20mg q8h  - Continue florinef 0.1mg  BID   - Continue SDS with hydrocortisone 50mg q12h  - Holding heparin infusion for now given bleeding from trach site     PULM: Arrived to SICU intubated julio c-CPR. Concern for massive PE. Started on iEpo, currently on 0.05. Hypoxic respiratory failure. Severe ARDS. ETT exchanged 3/23. CT Chest 3/26 with interval JONH consolidative/ground glass opacity. S/p Tracheostomy on 4/1. Currently on SIMV 40%, 550, 18, +10, 5 PS. PIP up to 41 with Vt ~250-300. RT able to suction out clot and PIP improved to 26 and Vt back to ~500,  - bedside bronch -> some blood in trach, posterior wall tissue just distal to trach tip appears to be collapsing on cannula.   - add nebulized TXA 500mg q8h x3 doses  - ARDSnet lung protective strategies  - Wean FiO2 as tolerated  - ABGs prn  - additional pulm toilet prn  - daily CXR    ENT: Had epistaxis 3/23, completed afrin bid x3 days. S/p trach on 4/1. Bleeding from trach site 4/2 am, packing placed by ENT. Repeat episode of bloody tracheal secretions 4/3 am.  - ENT to perform exam under DL -> some blood pooling distal to cords, clot retrieved, and administered afrin  - TXA as above and holding heparin for now     GI: NPO. Shock liver, pancreatitis. Elevated TG. Elevated lactate. Consulted ACS for potential bowel ischemia as cause of persistent lactic acidosis. CT imaging 3/22 with evidence of pancreatitis. No acute surgical indication per ACS. RUQ US 3/22 with thickening of GB wall without cholelithiasis. CT A/P 3/26 negative for acute bleed. LFTs downtrending. Worsening hyperbilirubinemia. Amylase and lipase now WNL. Repeat RUQ US 4/1 with nonspecific gallbladder wall edema and thickening which may be 2/2 generalized fluid overload, mild hepatomegaly with slight nodular contour and c/f steatosis and fibrosis, irregular hypoechoic region adjacent to hepatic veins.  - Hepatology following, appreciate recs  - f/u liver duplex  - hold TFs for now  - prostat daily  - PPI for GI ppx  - Trend LFTs  daily  - IV thiamine 100mg IV x7 days -> finish today     : No history of renal disease. Baseline creatinine 0.6. Anuric RUTH. Elevated CK, downtrending. Metabolic acidosis, total body fluid overload, hyperkalemia. Started on CVVH 3/21, stopped bicarb infusion 3/22. Marino removed 3/24. Hyponatremia resolved. Stopped CVVH per Nephrology on 4/2. Net negative 2.4L for past 24hrs with 3L fluid removal with SLED.  - Nephrology following, appreciate recs -> will consider resuming CVVH given pressor requirements  - RFP BID and PRN  - Replete electrolytes judiciously  - Daily bladder scan     HEME: Patient was on ppx lovenox for DVT prophylaxis prior to cardiopulmonary arrest. Concern for massive PE patient received bolus of TPA during CPR. Started on heparin infusion overnight given concern for PE. Hemorrhagic shock 3/21, MTP and back to OR s/p Exploration of right thigh woundEvacuation of hematoma. Suture ligation of multiple bleeding vessel and several points in gluteal area. Hematology consulted 3/22 to r/o HLH. Transfused 1 RBC overnight 3/22-3/23. Thrombocytopenia, coagulapathy, hypofibrinogenemia. LE Duplex 3/25 +acute occlusive R soleal DVT. Received 2u PRBC and 1u FFP on 3/26. Heparin infusion discontinued 3/26 to r/o HIT and transitioned to bival. PF4 negative, resumed heparin infuision 3/27. Elevated IL2R and decreased NK function. C/f HLH (low suspicion), empirically treated with decadron (3/28-3/31). Thrombocytopenia on 3/30 to 18k, received 5pk, did not increment. Heparin held. Thrombocytopenia likely 2/2 to consumptive process, hematology following. Received IVIG and 5pk plts 3/31. Plt count improved to 68k, now down to 44k this am. Worsening coagulopathy with INR up to 2.5.  - transfuse 1u FFP, 5pk Plts, and give Vitamin K 10mg IV x1, TXA nebs as above  - cbc, coags bid and prn  - fibrinogen daily  - holding heparin infusion  - Hematology following, appreciate recs -> maintain Plt count >35k  - ongoing  monitoring for s/s bleeding  - close surveillance of RLE and drains  - Vasc Med signed off 3/27  - maintain active T&S (4/3)    ENDO: History of DM2. A1c 7.5%. TSH WNL 1/2024. Hyperglycemia, likely 2/2 steroids.  - dc insulin infusion  - start lantus 10u q12h  - q4h accuchecks and SSI #2     ID: Leukocytosis resolved, now with leukopenia. On broad antimicrobial therapy. MRSA screen + 2/28/24. Pan cultures sent 3/22. Sputum NTF, +MRSA screen (completed 5 day course mupirocin), UCx and BCx negative. Completed 7 day course vanc/zosyn 3/27. Febrile to 39.1 today.  - temp q4h, wbc daily  - obtain blood cultures and BAL  - resume vanc and zosyn  - ongoing monitoring for s/s infection    Lines:  - right brachial arterial line (3/20)  - RIJ trialysis (3/27)  - left subclavian MAC with mini MAC (3/20)  - PIV x1     I have discussed the case with the resident/advanced practice provider. I have personally performed a history, physical exam, and my own medical decision making. I have reviewed the note and agree with the findings and plan with the following exceptions as identified below. I have personally provided 36 minutes of critical care time exclusive of time spent on separately billable procedures. Time includes review of laboratory data, radiology results, discussion with consultants, family and monitoring for potential decompensation. Interventions were performed as documented above.      Family/Surrogate updated with plan of care.  Code status addressed/up to date.     Alan Xiong MD  SICU phone [phone]    Critical Care time: 67 minutes

## 2024-04-03 NOTE — PROGRESS NOTES
"Name: Jason Hollins  MRN: [id number]  Encounter Date: 4/3/2024  PCP: Mary Arenas, APRN-CNP  Heme-Onc: Dr. Carbajal    Reason for consult: ?HLH now with thrombocytopenia  Attending Provider Dr. Smith    Hematology/ Oncology Consult Daily Progress Note    Overnight Events   Pt remains intubated and sedated.     Reportedly had bleeding from trach today prompting ENT evaluation and bronchoscopy. Thought to be from irritation of posterior trachea from tracheostomy. Heparin was held. He received inhaled TXA, 1u of platelets, and vitamin K.     Review of Systems   12 Point ROS negative except HPI     Allergies   No Known Allergies    Medications     esomeprazole, 40 mg, Daily before breakfast  fludrocortisone, 0.1 mg, q12h  hydrocortisone sodium succinate, 50 mg, q12h  insulin glargine, 10 Units, q12h  insulin lispro, 0-10 Units, q4h  midodrine, 20 mg, q8h  phenylephrine HCl in 0.9% NaCl, ,   piperacillin-tazobactam, 3.375 g, q6h  potassium phosphate, 21 mmol, Once  rocuronium, ,   tranexamic acid, 500 mg, q8h  vancomycin, 2,000 mg, Once      [Held by provider] heparin, Last Rate: Stopped (04/03/24 0425)  lactated Ringer's, Last Rate: 5 mL/hr (04/03/24 0400)  norepinephrine, Last Rate: 0.1 mcg/kg/min (04/03/24 1717)  propofol, Last Rate: 15 mcg/kg/min (04/03/24 1717)      alteplase, 2 mg, PRN  calcium gluconate, 1 g, q6h PRN  calcium gluconate, 2 g, q6h PRN  dextrose, 25 g, q15 min PRN   Or  glucagon, 1 mg, q15 min PRN  magnesium sulfate, 2 g, q6h PRN  magnesium sulfate, 4 g, q6h PRN  oxygen, , Continuous PRN - O2/gases  phenylephrine HCl in 0.9% NaCl, ,   rocuronium, ,   vancomycin, , Daily PRN        Physical Exam   Blood pressure 134/57, pulse 109, temperature 38.7 °C (101.7 °F), resp. rate 18, height 1.778 m (5' 10\"), weight 125 kg (275 lb 5.7 oz), SpO2 93 %.    GEN: NAD, intubated but awake, jaundiced  HEENT: NC/AT, MMM, crusted blood in mouth, around trach  CV: RRR no m/r/g   Chest: CTAB + intubated w/ mechanical " breath sounds  GI: soft, NTND  MSK: no edema  Skin: no obvious rashes, +scattered ecchymoses   Neuro: intubated, sedated      Labs     Lab Results   Component Value Date    GLUCOSE 140 (H) 04/03/2024    CALCIUM 8.4 (L) 04/03/2024     (L) 04/03/2024    K 3.8 04/03/2024    CO2 23 04/03/2024    CL 97 (L) 04/03/2024    BUN 27 (H) 04/03/2024    CREATININE 1.12 04/03/2024       Lab Results   Component Value Date    WBC 3.1 (L) 04/03/2024    HGB 7.3 (L) 04/03/2024    HCT 22.1 (L) 04/03/2024    MCV 88 04/03/2024    PLT 59 (L) 04/03/2024       Lab Results   Component Value Date     (H) 04/03/2024     (H) 04/03/2024    GGT 21 02/13/2024    ALKPHOS 202 (H) 04/03/2024    BILITOT 23.5 (H) 04/03/2024         Assessment/Plan     Jason Hollins is a 63 y.o. 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 Post operative course was uncomplicated and patient was on RNF until 3/20 for prolonged bed rest to avoid hip flexion to maintain flap integrity. However on 3/20, patient developed a PE arrest and was coded for an hour receiving TPA with ROSC. Pt was thought to have HLH partially meeting criteria so started on dex 10mg/m2 3/28 without significant improvement. Now with dropping plts, smear reviewed again on 3/31 showing toxic granules and left shifted neutrophils suggesting ongoing/recent infection or significant stressor. Plts decreased in number but responding to transfusion. Not on abx or recent medications that could cause drug induced thrombocytopenia but is on constant CVVH which can cause plt destruction, also with bleeding from oral mucosa which can cause physiologic consumption. Dexamethasone for HLH was stopped 3/31 and patient was given IVIG 0.4g/kg x 1 on 3/31 with mild improvement in platelets. They  peaked at 68 and have fallen again to 44.     Peripheral Smear Reviewed 3/31/24  WBCs: toxic appearing PMNs and few bands suggesting current or recent infection or significant stressor  RBCs: occasional [employer] cells c/w renal impairment/failure, occasional target cell c/w known liver abnormalities, polychromasia, no RBC frags   Plt: reduced, no clumping     #thrombocytopenia  -likely toxic/infectious related  -transfuse plts as needed to threshold of 50 with ongoing need for AC/bleeding  -will hold off on further IVIG for now given relatively low benefit and monitor daily   -daily CBC w/ diff    Thank you for this consult, we will continue to follow. Pt seen and discussed with Dr. Cantor.    Wilmer Shane MD  Hematology-Oncology Fellow, PGY4  Hematology Consult Pager: [phone]  Oncology Consult Pager: [phone]

## 2024-04-03 NOTE — PROGRESS NOTES
Jason Hollins is a 63 y.o. male on day 29 of admission presenting with Soft tissue sarcoma (CMS/HCC).    Subjective   Patient remains on precedex this am 2/2 tachypnea and increased WOB. 3L fluid removal with SLED overnight. Repeat episode of bleeding early this am and heparin infusion was held.     Objective     Physical Exam  Vitals reviewed.   Constitutional:       Appearance: He is ill-appearing and diaphoretic.      Comments: Trached.   HENT:      Head:      Comments: Edematous face/head/neck     Nose:      Comments: Dobhoff in R nare bridled in place at 65cm.     Mouth/Throat:      Mouth: Mucous membranes are dry.   Eyes:      General: Scleral icterus present.      Pupils: Pupils are equal, round, and reactive to light.      Comments: Conjunctival edema. Subconjunctival hemorrhage.   Neck:      Comments: Trach midline. 6-0 proximal Shiley XLT, dressing with bloody drainage. RIJ trialysis line in place, dressing c/d/i.   Cardiovascular:      Rate and Rhythm: Normal rate and regular rhythm.      Pulses:           Radial pulses are 1+ on the right side and 1+ on the left side.        Dorsalis pedis pulses are 1+ on the right side and 1+ on the left side.      Heart sounds: Normal heart sounds.   Pulmonary:      Comments: Mechanically ventilated via trach. Labored breathing. Diminished breath sounds bilaterally. Equal chest rise. Bloody secretions via trach. Current vent settings SIMV 40%, 550, 18, +10, 5 PS.  Abdominal:      General: Abdomen is protuberant. Bowel sounds are normal.      Palpations: Abdomen is soft.      Comments: Obese abdomen.   Genitourinary:     Comments: Penile and scrotal edema. Dignicare in place with liquid brown stool.  Musculoskeletal:      Cervical back: Neck supple.      Comments: Generalized weakness.   Skin:     General: Skin is warm.      Coloration: Skin is jaundiced.      Comments: Anasarca. Right flap site with wound VAC, no output. ANDERSON x 3 all with serosanguinous output.  "  Neurological:      GCS: GCS eye subscore is 4. GCS verbal subscore is 1. GCS motor subscore is 6.      Comments: Trached. Patient opening eyes spontaneously and following simple commands. Weak bilateral hand grasps and wiggles toes in left foot. Unable to move RLE.      Psychiatric:      Comments: HANNAH.     Last Recorded Vitals  Blood pressure (!) 108/47, pulse 87, temperature 36.4 °C (97.5 °F), temperature source Temporal, resp. rate (!) 29, height 1.778 m (5' 10\"), weight 125 kg (275 lb 5.7 oz), SpO2 96 %.    VS over last 24h  Heart Rate:  [69-96]   Temp:  [36.2 °C (97.2 °F)-36.8 °C (98.2 °F)]   Resp:  [17-36]   Weight:  [125 kg (275 lb 5.7 oz)]   SpO2:  [87 %-100 %]      Scheduled medications  esomeprazole, 40 mg, nasogastric tube, Daily before breakfast  fludrocortisone, 0.1 mg, nasogastric tube, q12h  hydrocortisone sodium succinate, 50 mg, intravenous, q12h  insulin glargine, 10 Units, subcutaneous, q12h  insulin lispro, 0-10 Units, subcutaneous, q4h  midodrine, 20 mg, orogastric tube, q8h  potassium phosphate, 21 mmol, intravenous, Once      Continuous medications  [Held by provider] heparin, 0-4,000 Units/hr, Last Rate: Stopped (04/03/24 0425)  lactated Ringer's, 5 mL/hr, Last Rate: 5 mL/hr (04/03/24 0400)  norepinephrine, 0.01-0.2 mcg/kg/min (Dosing Weight), Last Rate: 0.12 mcg/kg/min (04/03/24 1015)  propofol, 5-50 mcg/kg/min, Last Rate: 10 mcg/kg/min (04/03/24 1003)      PRN medications  PRN medications: alteplase, calcium gluconate, calcium gluconate, dextrose **OR** glucagon, magnesium sulfate, magnesium sulfate, oxygen      Results for orders placed or performed during the hospital encounter of 03/05/24 (from the past 24 hour(s))   POCT GLUCOSE   Result Value Ref Range    POCT Glucose 202 (H) 74 - 99 mg/dL   POCT GLUCOSE   Result Value Ref Range    POCT Glucose 207 (H) 74 - 99 mg/dL   CBC   Result Value Ref Range    WBC 8.1 4.4 - 11.3 x10*3/uL    nRBC 0.6 (H) 0.0 - 0.0 /100 WBCs    RBC 2.73 (L) 4.50 - " 5.90 x10*6/uL    Hemoglobin 8.1 (L) 13.5 - 17.5 g/dL    Hematocrit 22.6 (L) 41.0 - 52.0 %    MCV 83 80 - 100 fL    MCH 29.7 26.0 - 34.0 pg    MCHC 35.8 32.0 - 36.0 g/dL    RDW 20.4 (H) 11.5 - 14.5 %    Platelets 68 (L) 150 - 450 x10*3/uL   Coagulation Screen   Result Value Ref Range    Protime 24.2 (H) 9.8 - 12.8 seconds    INR 2.1 (H) 0.9 - 1.1    aPTT 41 (H) 27 - 38 seconds   POCT GLUCOSE   Result Value Ref Range    POCT Glucose 194 (H) 74 - 99 mg/dL   POCT GLUCOSE   Result Value Ref Range    POCT Glucose 181 (H) 74 - 99 mg/dL   POCT GLUCOSE   Result Value Ref Range    POCT Glucose 150 (H) 74 - 99 mg/dL   POCT GLUCOSE   Result Value Ref Range    POCT Glucose 142 (H) 74 - 99 mg/dL   POCT GLUCOSE   Result Value Ref Range    POCT Glucose 156 (H) 74 - 99 mg/dL   POCT GLUCOSE   Result Value Ref Range    POCT Glucose 154 (H) 74 - 99 mg/dL   POCT GLUCOSE   Result Value Ref Range    POCT Glucose 145 (H) 74 - 99 mg/dL   Heparin Assay, UFH   Result Value Ref Range    Heparin Unfractionated 0.1 See Comment Below for Therapeutic Ranges IU/mL   POCT GLUCOSE   Result Value Ref Range    POCT Glucose 154 (H) 74 - 99 mg/dL   POCT GLUCOSE   Result Value Ref Range    POCT Glucose 144 (H) 74 - 99 mg/dL   Type and Screen   Result Value Ref Range    ABO TYPE O     Rh TYPE POS     ANTIBODY SCREEN NEG    CBC   Result Value Ref Range    WBC 4.3 (L) 4.4 - 11.3 x10*3/uL    nRBC 0.9 (H) 0.0 - 0.0 /100 WBCs    RBC 2.59 (L) 4.50 - 5.90 x10*6/uL    Hemoglobin 7.7 (L) 13.5 - 17.5 g/dL    Hematocrit 21.6 (L) 41.0 - 52.0 %    MCV 83 80 - 100 fL    MCH 29.7 26.0 - 34.0 pg    MCHC 35.6 32.0 - 36.0 g/dL    RDW 20.4 (H) 11.5 - 14.5 %    Platelets 49 (L) 150 - 450 x10*3/uL   Coagulation Screen   Result Value Ref Range    Protime 28.8 (H) 9.8 - 12.8 seconds    INR 2.5 (H) 0.9 - 1.1    aPTT 115 (HH) 27 - 38 seconds   Heparin Assay, UFH   Result Value Ref Range    Heparin Unfractionated 0.4 See Comment Below for Therapeutic Ranges IU/mL   Magnesium    Result Value Ref Range    Magnesium 2.19 1.60 - 2.40 mg/dL   Renal Function Panel   Result Value Ref Range    Glucose 119 (H) 74 - 99 mg/dL    Sodium 137 136 - 145 mmol/L    Potassium 4.0 3.5 - 5.3 mmol/L    Chloride 100 98 - 107 mmol/L    Bicarbonate 28 21 - 32 mmol/L    Anion Gap 13 10 - 20 mmol/L    Urea Nitrogen 55 (H) 6 - 23 mg/dL    Creatinine 1.86 (H) 0.50 - 1.30 mg/dL    eGFR 40 (L) >60 mL/min/1.73m*2    Calcium 8.6 8.6 - 10.6 mg/dL    Phosphorus 1.7 (L) 2.5 - 4.9 mg/dL    Albumin 3.6 3.4 - 5.0 g/dL   Fibrinogen   Result Value Ref Range    Fibrinogen 234 200 - 400 mg/dL   Hepatic function panel   Result Value Ref Range    Albumin 3.6 3.4 - 5.0 g/dL    Bilirubin, Total 23.5 (H) 0.0 - 1.2 mg/dL    Bilirubin, Direct 16.2 (H) 0.0 - 0.3 mg/dL    Alkaline Phosphatase 202 (H) 33 - 136 U/L     (H) 10 - 52 U/L     (H) 9 - 39 U/L    Total Protein 5.5 (L) 6.4 - 8.2 g/dL   CALCIUM, IONIZED   Result Value Ref Range    POCT Calcium, Ionized 1.17 1.1 - 1.33 mmol/L   POCT GLUCOSE   Result Value Ref Range    POCT Glucose 118 (H) 74 - 99 mg/dL   POCT GLUCOSE   Result Value Ref Range    POCT Glucose 130 (H) 74 - 99 mg/dL   CBC   Result Value Ref Range    WBC 4.0 (L) 4.4 - 11.3 x10*3/uL    nRBC 1.8 (H) 0.0 - 0.0 /100 WBCs    RBC 2.74 (L) 4.50 - 5.90 x10*6/uL    Hemoglobin 8.1 (L) 13.5 - 17.5 g/dL    Hematocrit 22.6 (L) 41.0 - 52.0 %    MCV 83 80 - 100 fL    MCH 29.6 26.0 - 34.0 pg    MCHC 35.8 32.0 - 36.0 g/dL    RDW 20.8 (H) 11.5 - 14.5 %    Platelets 44 (L) 150 - 450 x10*3/uL   POCT GLUCOSE   Result Value Ref Range    POCT Glucose 127 (H) 74 - 99 mg/dL   POCT GLUCOSE   Result Value Ref Range    POCT Glucose 129 (H) 74 - 99 mg/dL   Blood Gas Arterial Full Panel   Result Value Ref Range    POCT pH, Arterial 7.48 (H) 7.38 - 7.42 pH    POCT pCO2, Arterial 37 (L) 38 - 42 mm Hg    POCT pO2, Arterial 77 (L) 85 - 95 mm Hg    POCT SO2, Arterial 98 94 - 100 %    POCT Oxy Hemoglobin, Arterial 95.6 94.0 - 98.0 %    POCT  Hematocrit Calculated, Arterial 24.0 (L) 41.0 - 52.0 %    POCT Sodium, Arterial 131 (L) 136 - 145 mmol/L    POCT Potassium, Arterial 3.6 3.5 - 5.3 mmol/L    POCT Chloride, Arterial 102 98 - 107 mmol/L    POCT Ionized Calcium, Arterial 1.12 1.10 - 1.33 mmol/L    POCT Glucose, Arterial 132 (H) 74 - 99 mg/dL    POCT Lactate, Arterial 1.3 0.4 - 2.0 mmol/L    POCT Base Excess, Arterial 3.9 (H) -2.0 - 3.0 mmol/L    POCT HCO3 Calculated, Arterial 27.6 (H) 22.0 - 26.0 mmol/L    POCT Hemoglobin, Arterial 8.1 (L) 13.5 - 17.5 g/dL    POCT Anion Gap, Arterial 5 (L) 10 - 25 mmo/L    Patient Temperature 37.0 degrees Celsius    FiO2 50 %   Renal Function Panel   Result Value Ref Range    Glucose 140 (H) 74 - 99 mg/dL    Sodium 135 (L) 136 - 145 mmol/L    Potassium 3.8 3.5 - 5.3 mmol/L    Chloride 97 (L) 98 - 107 mmol/L    Bicarbonate 23 21 - 32 mmol/L    Anion Gap 19 10 - 20 mmol/L    Urea Nitrogen 27 (H) 6 - 23 mg/dL    Creatinine 1.12 0.50 - 1.30 mg/dL    eGFR 74 >60 mL/min/1.73m*2    Calcium 8.4 (L) 8.6 - 10.6 mg/dL    Phosphorus 1.4 (L) 2.5 - 4.9 mg/dL    Albumin 3.6 3.4 - 5.0 g/dL   Magnesium   Result Value Ref Range    Magnesium 2.00 1.60 - 2.40 mg/dL   POCT GLUCOSE   Result Value Ref Range    POCT Glucose 142 (H) 74 - 99 mg/dL        Assessment/Plan   Jason Hollins is a 63 y.o. 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 Hawkins and Dr. Smith on 3/5/24.  Post operative course was uncomplicated and patient was on RNF until 3/20 for prolonged bed rest to avoid hip flexion to maintain flap integrity.  Patient was on prophylactic lovenox while on bedrest.     3/20: Cardiopulmonary arrest s/p CPR with ROSC after TPA, overnight started on heparin, epo, increased pressor requirements, increased swelling at flap site.     3/21:  Hemorrhagic shock, MTP. Firm and large right flap site. Return to OR s/p Exploration of right thigh wound, Evacuation of hematoma. Suture ligation of multiple bleeding vessel and several points in gluteal area.     4/1: s/p Trach    Plan:  NEURO: History of myxoid chondrosarcoma s/p wide resection sarcoma, sciatic nerve neurolysis of right lower extremity with right gluteal reconstruction, pedicle ALT, vastus lateralus flap, pedicle gluteal and perisformis flap with Dr. Hawkins and Dr. Smith on 3/5/24 s/p cardiopulmonary arrest with ROSC 3/20. Documented to have followed commands off sedation. CT head 3/22 without acute process. Weaned off cisatracurium and propofol overnight 3/23-3/24. Ketamine weaned off 3/25 and Fentanyl weaned off 3/26 am. Repeat CT Head 3/26 without acute process. MRI Brain 3/30, negative for cerebral infarction or hemorrhage. Neuro exam - Patient opening eyes to verbal stimulation and following simple commands. Weak bilateral hand grasps and wiggles toes in left foot. Unable to move RLE. Off all sedation.  - ongoing neuro and pain assessments  - switch precedex to propofol, titrate to RASS goal 0 to -2  - PT/OT -> PROM  - no need for soft wrist restraints at this time -> continue to reassess     CV: History of HTN, HLD. Acute cardiopulmonary arrest 2/2 to possible massive PE vs massive STEMI, received multiple rounds of CPR and one defibrillation.  Sustained ROSC achieved after TPA bolus. On high dose levophed, epinephrine, vaso, angioT2. Plan on 3/21 was for ECMO which was aborted secondary to large hemhorrge at right flap site. Had MTP and taken OR with 5L evacuated. ECHO 3/21: Normal LV, Mod enlarged RV, slight suggestion of a Townsend's sign / RV strain, low normal RV function. ECHO 3/22 with hyperdynamic LV. New onset Afib with RVR overnight 3/22-3/23, dosed with 150mg amio x2, started infusion at 1mg/min thereafter. AT2 weaned off. Amio infusion discontinued 3/25. Lactate downtrending.  Vaso and epi weaned off. Remains in NSR. iEpo weaned off 3/27. Repeat TTE 3/29 and 4/2 essentially unchanged. Levo resumed 4/2 evening to continue aggressive fluid removal.  - continuous EKG/abp/cvp monitoring  - Goal map range 65-90  - wean levophed as tolerated  - Continue midodrine 20mg q8h  - Continue florinef 0.1mg BID   - Continue SDS with hydrocortisone 50mg q12h  - Holding heparin infusion for now given bleeding from trach site     PULM: Arrived to SICU intubated julio c-CPR. Concern for massive PE. Started on iEpo, currently on 0.05. Hypoxic respiratory failure. Severe ARDS. ETT exchanged 3/23. CT Chest 3/26 with interval JOHN consolidative/ground glass opacity. S/p Tracheostomy on 4/1. Currently on SIMV 40%, 550, 18, +10, 5 PS. PIP up to 41 with Vt ~250-300. RT able to suction out clot and PIP improved to 26 and Vt back to ~500,  - bedside bronch -> some blood in trach, posterior wall tissue just distal to trach tip appears to be collapsing on cannula.   - add nebulized TXA 500mg q8h x3 doses  - ARDSnet lung protective strategies  - Wean FiO2 as tolerated  - ABGs prn  - additional pulm toilet prn  - daily CXR    ENT: Had epistaxis 3/23, completed afrin bid x3 days. S/p trach on 4/1. Bleeding from trach site 4/2 am, packing placed by ENT. Repeat episode of bloody tracheal secretions 4/3 am.  - ENT to perform exam under DL -> some blood pooling distal to cords, clot retrieved, and administered afrin  - TXA as above and holding heparin for now     GI: NPO. Shock liver, pancreatitis. Elevated TG. Elevated lactate. Consulted ACS for potential bowel ischemia as cause of persistent lactic acidosis. CT imaging 3/22 with evidence of pancreatitis. No acute surgical indication per ACS. RUQ US 3/22 with thickening of GB wall without cholelithiasis. CT A/P 3/26 negative for acute bleed. LFTs downtrending. Worsening hyperbilirubinemia. Amylase and lipase now WNL. Repeat RUQ US 4/1 with nonspecific gallbladder wall edema and  thickening which may be 2/2 generalized fluid overload, mild hepatomegaly with slight nodular contour and c/f steatosis and fibrosis, irregular hypoechoic region adjacent to hepatic veins.  - Hepatology following, appreciate recs  - f/u liver duplex  - hold TFs for now  - prostat daily  - PPI for GI ppx  - Trend LFTs daily  - IV thiamine 100mg IV x7 days -> finish today     : No history of renal disease. Baseline creatinine 0.6. Anuric RUTH. Elevated CK, downtrending. Metabolic acidosis, total body fluid overload, hyperkalemia. Started on CVVH 3/21, stopped bicarb infusion 3/22. Marino removed 3/24. Hyponatremia resolved. Stopped CVVH per Nephrology on 4/2. Net negative 2.4L for past 24hrs with 3L fluid removal with SLED.  - Nephrology following, appreciate recs -> will consider resuming CVVH given pressor requirements  - RFP BID and PRN  - Replete electrolytes judiciously  - Daily bladder scan     HEME: Patient was on ppx lovenox for DVT prophylaxis prior to cardiopulmonary arrest. Concern for massive PE patient received bolus of TPA during CPR. Started on heparin infusion overnight given concern for PE. Hemorrhagic shock 3/21, MTP and back to OR s/p Exploration of right thigh woundEvacuation of hematoma. Suture ligation of multiple bleeding vessel and several points in gluteal area. Hematology consulted 3/22 to r/o HLH. Transfused 1 RBC overnight 3/22-3/23. Thrombocytopenia, coagulapathy, hypofibrinogenemia. LE Duplex 3/25 +acute occlusive R soleal DVT. Received 2u PRBC and 1u FFP on 3/26. Heparin infusion discontinued 3/26 to r/o HIT and transitioned to bival. PF4 negative, resumed heparin infuision 3/27. Elevated IL2R and decreased NK function. C/f HLH (low suspicion), empirically treated with decadron (3/28-3/31). Thrombocytopenia on 3/30 to 18k, received 5pk, did not increment. Heparin held. Thrombocytopenia likely 2/2 to consumptive process, hematology following. Received IVIG and 5pk plts 3/31. Plt count  improved to 68k, now down to 44k this am. Worsening coagulopathy with INR up to 2.5.  - transfuse 1u FFP, 5pk Plts, and give Vitamin K 10mg IV x1, TXA nebs as above  - cbc, coags bid and prn  - fibrinogen daily  - holding heparin infusion  - Hematology following, appreciate recs -> maintain Plt count >35k  - ongoing monitoring for s/s bleeding  - close surveillance of RLE and drains  - Vasc Med signed off 3/27  - maintain active T&S (4/3)    ENDO: History of DM2. A1c 7.5%. TSH WNL 1/2024. Hyperglycemia, likely 2/2 steroids.  - dc insulin infusion  - start lantus 10u q12h  - q4h accuchecks and SSI #2     ID: Leukocytosis resolved, now with leukopenia. On broad antimicrobial therapy. MRSA screen + 2/28/24. Pan cultures sent 3/22. Sputum NTF, +MRSA screen (completed 5 day course mupirocin), UCx and BCx negative. Completed 7 day course vanc/zosyn 3/27. Febrile to 39.1 today.  - temp q4h, wbc daily  - obtain blood cultures and BAL  - resume vanc and zosyn  - ongoing monitoring for s/s infection    Lines:  - right brachial arterial line (3/20)  - RIJ trialysis (3/27)  - left subclavian MAC with mini MAC (3/20)  - PIV x1     Dispo: Continue ICU care. Patient seen and discussed with ICU attending Dr. Inga Peter, APRN-CNP  SICU phone [phone]    Critical Care time: 67 minutes

## 2024-04-03 NOTE — PROCEDURES
Procedures    Bedside bronchoscopy performed due to increased peak inspiratory pressure and difficulty achieving adequate tidal volumes. Verbal consent obtained from family members who were at bedside. Patient already on propofol sedation. RT and attending at bedside. After obtaining barrier precautions, fibreoptic scope sprayed with silicone and was advanced through tracheostomy tube. Bloody field was obstructing field view. 10cc of normal saline followed by 5cc of air was flushed and suctioned bloody secretions to improve visualization. Airadna and bilateral bronchi were then visualized with infolding of posterior tracheal wall into distal tip of tracheostomy tube which was about 2 cm above the ariadna. No active bleeding sites were noted. Thereafter bronchoscopy was repeated by attending with similar findings and procedure was halted without further advancement into bronchi as patient started to desaturate to the low 90's. Upon withdrawal of the bronchoscope and ventilating patient, his saturation picked up to high 90s instantaneously and pulmonary pressures improved along with adequate delivery of tidal volumes in the 500s. Patient tolerated procedure adequately without major complications.

## 2024-04-03 NOTE — PROGRESS NOTES
"      Department of Plastic and Reconstructive Surgery  Daily Progress Note    Patient Name: Jason Hollins  MRN: [id number]  Date:  04/03/24     Subjective  Jason remains critical in CTICU, trach in place, able to follow commands weakly, weak bilateral hand squeeze and wiggled left toes, no movement of right toes. Was placed on .09 levo last night and day nurse to wean as tolerated. Hgb stable and Cr continues to trend down, on CRRT. Total bili continues to trend up and hepatology on with liver U/S pending.      Objective    Vital Signs  BP (!) 108/47   Pulse 80   Temp 36.4 °C (97.5 °F) (Temporal)   Resp 23   Ht 1.778 m (5' 10\")   Wt 125 kg (275 lb 5.7 oz)   SpO2 96%   BMI 39.51 kg/m²      Physical Exam   Constitutional: +trach, sedation weaned off. On low dose pressor. Opens eyes to verbal stimuli and tracks around room. Squeezes hand and wiggles L toes.  ENMT: MMM, dobhoff in R nare   Cardiovascular: RRR on telemetry monitor.  Respiratory/Thorax: Mechanically ventilated via trach   Gastrointestinal: Abdomen soft, protuberant.   Genitourinary: CVVHD per nephro  Musculoskeletal: Minimal purposeful movement appreciated.   Extremities: Generalized pitting edema, slightly improved from prior. Right thigh edematous, but soft and compressible, no bruising or tissue induration noted. Incisional wound vac in place to flap incisions, holding suction at -75 mmHG, no leak or alarm present. ANDERSON drain x 3 with dark serous output.  Neurological:  Opens eyes to verbal stimuli and tracks around room.  Weak bilateral hand grasps and wiggles toes in left foot. Unable to move RLE.   Skin: +jaundice. Warm, dry.     Diagnostics   Results for orders placed or performed during the hospital encounter of 03/05/24 (from the past 24 hour(s))   Transthoracic Echo (TTE) Limited   Result Value Ref Range    LVOT diam 2.30 cm    LV Biplane EF 69 %    LA vol index A/L 28.0 ml/m2    LVIDd 5.20 cm    LV A4C EF 65.5     BSA 2.48 m2   POCT GLUCOSE "   Result Value Ref Range    POCT Glucose 198 (H) 74 - 99 mg/dL   POCT GLUCOSE   Result Value Ref Range    POCT Glucose 202 (H) 74 - 99 mg/dL   POCT GLUCOSE   Result Value Ref Range    POCT Glucose 207 (H) 74 - 99 mg/dL   CBC   Result Value Ref Range    WBC 8.1 4.4 - 11.3 x10*3/uL    nRBC 0.6 (H) 0.0 - 0.0 /100 WBCs    RBC 2.73 (L) 4.50 - 5.90 x10*6/uL    Hemoglobin 8.1 (L) 13.5 - 17.5 g/dL    Hematocrit 22.6 (L) 41.0 - 52.0 %    MCV 83 80 - 100 fL    MCH 29.7 26.0 - 34.0 pg    MCHC 35.8 32.0 - 36.0 g/dL    RDW 20.4 (H) 11.5 - 14.5 %    Platelets 68 (L) 150 - 450 x10*3/uL   Coagulation Screen   Result Value Ref Range    Protime 24.2 (H) 9.8 - 12.8 seconds    INR 2.1 (H) 0.9 - 1.1    aPTT 41 (H) 27 - 38 seconds   POCT GLUCOSE   Result Value Ref Range    POCT Glucose 194 (H) 74 - 99 mg/dL   POCT GLUCOSE   Result Value Ref Range    POCT Glucose 181 (H) 74 - 99 mg/dL   POCT GLUCOSE   Result Value Ref Range    POCT Glucose 150 (H) 74 - 99 mg/dL   POCT GLUCOSE   Result Value Ref Range    POCT Glucose 142 (H) 74 - 99 mg/dL   POCT GLUCOSE   Result Value Ref Range    POCT Glucose 156 (H) 74 - 99 mg/dL   POCT GLUCOSE   Result Value Ref Range    POCT Glucose 154 (H) 74 - 99 mg/dL   POCT GLUCOSE   Result Value Ref Range    POCT Glucose 145 (H) 74 - 99 mg/dL   Heparin Assay, UFH   Result Value Ref Range    Heparin Unfractionated 0.1 See Comment Below for Therapeutic Ranges IU/mL   POCT GLUCOSE   Result Value Ref Range    POCT Glucose 154 (H) 74 - 99 mg/dL   POCT GLUCOSE   Result Value Ref Range    POCT Glucose 144 (H) 74 - 99 mg/dL   Type and Screen   Result Value Ref Range    ABO TYPE O     Rh TYPE POS     ANTIBODY SCREEN NEG    CBC   Result Value Ref Range    WBC 4.3 (L) 4.4 - 11.3 x10*3/uL    nRBC 0.9 (H) 0.0 - 0.0 /100 WBCs    RBC 2.59 (L) 4.50 - 5.90 x10*6/uL    Hemoglobin 7.7 (L) 13.5 - 17.5 g/dL    Hematocrit 21.6 (L) 41.0 - 52.0 %    MCV 83 80 - 100 fL    MCH 29.7 26.0 - 34.0 pg    MCHC 35.6 32.0 - 36.0 g/dL    RDW 20.4  (H) 11.5 - 14.5 %    Platelets 49 (L) 150 - 450 x10*3/uL   Coagulation Screen   Result Value Ref Range    Protime 28.8 (H) 9.8 - 12.8 seconds    INR 2.5 (H) 0.9 - 1.1    aPTT 115 (HH) 27 - 38 seconds   Heparin Assay, UFH   Result Value Ref Range    Heparin Unfractionated 0.4 See Comment Below for Therapeutic Ranges IU/mL   Magnesium   Result Value Ref Range    Magnesium 2.19 1.60 - 2.40 mg/dL   Renal Function Panel   Result Value Ref Range    Glucose 119 (H) 74 - 99 mg/dL    Sodium 137 136 - 145 mmol/L    Potassium 4.0 3.5 - 5.3 mmol/L    Chloride 100 98 - 107 mmol/L    Bicarbonate 28 21 - 32 mmol/L    Anion Gap 13 10 - 20 mmol/L    Urea Nitrogen 55 (H) 6 - 23 mg/dL    Creatinine 1.86 (H) 0.50 - 1.30 mg/dL    eGFR 40 (L) >60 mL/min/1.73m*2    Calcium 8.6 8.6 - 10.6 mg/dL    Phosphorus 1.7 (L) 2.5 - 4.9 mg/dL    Albumin 3.6 3.4 - 5.0 g/dL   Fibrinogen   Result Value Ref Range    Fibrinogen 234 200 - 400 mg/dL   Hepatic function panel   Result Value Ref Range    Albumin 3.6 3.4 - 5.0 g/dL    Bilirubin, Total 23.5 (H) 0.0 - 1.2 mg/dL    Bilirubin, Direct 16.2 (H) 0.0 - 0.3 mg/dL    Alkaline Phosphatase 202 (H) 33 - 136 U/L     (H) 10 - 52 U/L     (H) 9 - 39 U/L    Total Protein 5.5 (L) 6.4 - 8.2 g/dL   CALCIUM, IONIZED   Result Value Ref Range    POCT Calcium, Ionized 1.17 1.1 - 1.33 mmol/L   POCT GLUCOSE   Result Value Ref Range    POCT Glucose 118 (H) 74 - 99 mg/dL   POCT GLUCOSE   Result Value Ref Range    POCT Glucose 130 (H) 74 - 99 mg/dL   CBC   Result Value Ref Range    WBC 4.0 (L) 4.4 - 11.3 x10*3/uL    nRBC 1.8 (H) 0.0 - 0.0 /100 WBCs    RBC 2.74 (L) 4.50 - 5.90 x10*6/uL    Hemoglobin 8.1 (L) 13.5 - 17.5 g/dL    Hematocrit 22.6 (L) 41.0 - 52.0 %    MCV 83 80 - 100 fL    MCH 29.6 26.0 - 34.0 pg    MCHC 35.8 32.0 - 36.0 g/dL    RDW 20.8 (H) 11.5 - 14.5 %    Platelets 44 (L) 150 - 450 x10*3/uL   POCT GLUCOSE   Result Value Ref Range    POCT Glucose 127 (H) 74 - 99 mg/dL   POCT GLUCOSE   Result Value  Ref Range    POCT Glucose 129 (H) 74 - 99 mg/dL   Blood Gas Arterial Full Panel   Result Value Ref Range    POCT pH, Arterial 7.48 (H) 7.38 - 7.42 pH    POCT pCO2, Arterial 37 (L) 38 - 42 mm Hg    POCT pO2, Arterial 77 (L) 85 - 95 mm Hg    POCT SO2, Arterial 98 94 - 100 %    POCT Oxy Hemoglobin, Arterial 95.6 94.0 - 98.0 %    POCT Hematocrit Calculated, Arterial 24.0 (L) 41.0 - 52.0 %    POCT Sodium, Arterial 131 (L) 136 - 145 mmol/L    POCT Potassium, Arterial 3.6 3.5 - 5.3 mmol/L    POCT Chloride, Arterial 102 98 - 107 mmol/L    POCT Ionized Calcium, Arterial 1.12 1.10 - 1.33 mmol/L    POCT Glucose, Arterial 132 (H) 74 - 99 mg/dL    POCT Lactate, Arterial 1.3 0.4 - 2.0 mmol/L    POCT Base Excess, Arterial 3.9 (H) -2.0 - 3.0 mmol/L    POCT HCO3 Calculated, Arterial 27.6 (H) 22.0 - 26.0 mmol/L    POCT Hemoglobin, Arterial 8.1 (L) 13.5 - 17.5 g/dL    POCT Anion Gap, Arterial 5 (L) 10 - 25 mmo/L    Patient Temperature 37.0 degrees Celsius    FiO2 50 %   POCT GLUCOSE   Result Value Ref Range    POCT Glucose 142 (H) 74 - 99 mg/dL     XR chest 1 view    Result Date: 4/3/2024  STUDY: XR CHEST 1 VIEW;  4/3/2024 3:33 am   INDICATION: Signs/Symptoms:daily icu.   COMPARISON: Chest radiograph 04/02/2024 CT chest abdomen pelvis 03/26/2024   ACCESSION NUMBER(S): [id number]   ORDERING CLINICIAN: PAPITO VILLEGAS   FINDINGS: AP radiograph of the chest was provided.   Tracheostomy cannula in place. Enteric tube seen coursing below the level diaphragm with tip out of the field of view. Right IJ central venous catheter with tip overlying the mid SVC. Left subclavian central venous catheter with tip overlying the brachiocephalic vein.   CARDIOMEDIASTINAL SILHOUETTE: The cardiomediastinal silhouette is again partially obscured, stable appearance.   LUNGS: Low lung volumes contributing to bronchovascular crowding.. Slight interval improvement in aeration of the right-greater-than-left lungs with persistent prominent perihilar and  interstitial markings. Similar bibasilar and retrocardiac opacities with blunting of the costophrenic angles. No pneumothorax.   ABDOMEN: No remarkable upper abdominal findings.   BONES: No acute osseous changes.       1.  Slight interval improvement in aeration of the lungs with persistent pulmonary edema. 2. Similar trace bilateral pleural effusions with adjacent atelectasis. 3.  Medical devices as described above.   I personally reviewed the images/study and I agree with Isabella Walker DO's (radiology resident) findings as stated. This study was interpreted at [place], [place].   MACRO: None     Dictation workstation:   [id number]    XR chest 1 view    Result Date: 4/2/2024  Interpreted By:  Bubba Levin and Stephens Katherine STUDY: XR CHEST 1 VIEW;  4/2/2024 3:34 am   INDICATION: Signs/Symptoms:daily icu.   COMPARISON: Chest radiograph 04/01/2024   ACCESSION NUMBER(S): [id number]   ORDERING CLINICIAN: PAPITO VILLEGAS   FINDINGS: AP radiograph of the chest was provided.   Tracheostomy cannula in place. Right IJ central venous catheter with tip overlying the mid SVC. Enteric tube seen coursing below the level diaphragm with tip out of the field of view. Left subclavian central venous catheter with tip overlying the left brachiocephalic vein.   CARDIOMEDIASTINAL SILHOUETTE: The cardiomediastinal silhouette is again partially obscured.   LUNGS: Interval worsening in aeration of the right lung with prominent perihilar interstitial markings. Similar right-greater-than-left basilar opacities. Blunting of the costophrenic angles. No pneumothorax.   ABDOMEN: No remarkable upper abdominal findings.   BONES: No acute osseous changes.       1.  Interval worsening in aeration of the right lung with increased pulmonary edema. 2. Similar to slight interval worsening in right-greater-than-left basilar opacities favored to represent effusion and atelectasis. 3.   Medical devices as described above.   I personally reviewed the images/study and I agree with Isabella Walker DO's (radiology resident) findings as stated. This study was interpreted at [place], [place].   MACRO: None   Signed by: Bubba Levin 4/2/2024 12:42 PM Dictation workstation:   [id number]    XR chest 1 view    Result Date: 4/2/2024  Interpreted By:  Bubba Levin,  Gulshan Koroma STUDY: XR CHEST 1 VIEW; XR ABDOMEN 1 VIEW;  4/1/2024 7:18 pm   INDICATION: Signs/Symptoms:s/p tracheostomy; Signs/Symptoms:s/p corpak placement.   COMPARISON: Chest x-ray 04/01/2024.   ACCESSION NUMBER(S): [id number]; [id number]   ORDERING CLINICIAN: PAPITO VILLEGAS   FINDINGS: AP radiographs of the chest and abdomen were performed.   Right IJ approach central venous catheter tip projects over mid SVC. An enteric tube courses below the diaphragm with the tip projecting over stomach. Tracheostomy tube in place.   CARDIOMEDIASTINAL SILHOUETTE: Cardiomediastinal silhouette is partially obscured.   LUNGS: Prominent perihilar and interstitial markings. There are hazy opacification of the bilateral lung bases, increased when compared to the prior exam. There is obscuration of the costophrenic angles. No evidence of pneumothorax.   ABDOMEN: Nonobstructive bowel gas pattern.   BONES: No acute osseous changes.       1. Interval worsening pulmonary edema. Increased hazy opacities in bilateral lower lungs, right-greater-than-left may represent component of worsening pleural effusion with adjacent atelectasis/infiltrate. 2. Medical devices as above.   I personally reviewed the images/study and I agree with the findings as stated by resident physician Dr. Ga Noel . This study was interpreted at [place], [place].     MACRO: None   Signed by: Bubba Levin 4/2/2024 12:41 PM Dictation workstation:   [id number]    XR  abdomen 1 view    Result Date: 4/2/2024  Interpreted By:  Bubba Levin and Afshari Mirak Sohrab STUDY: XR CHEST 1 VIEW; XR ABDOMEN 1 VIEW;  4/1/2024 7:18 pm   INDICATION: Signs/Symptoms:s/p tracheostomy; Signs/Symptoms:s/p corpak placement.   COMPARISON: Chest x-ray 04/01/2024.   ACCESSION NUMBER(S): [id number]; [id number]   ORDERING CLINICIAN: PAPITO VILLEGAS   FINDINGS: AP radiographs of the chest and abdomen were performed.   Right IJ approach central venous catheter tip projects over mid SVC. An enteric tube courses below the diaphragm with the tip projecting over stomach. Tracheostomy tube in place.   CARDIOMEDIASTINAL SILHOUETTE: Cardiomediastinal silhouette is partially obscured.   LUNGS: Prominent perihilar and interstitial markings. There are hazy opacification of the bilateral lung bases, increased when compared to the prior exam. There is obscuration of the costophrenic angles. No evidence of pneumothorax.   ABDOMEN: Nonobstructive bowel gas pattern.   BONES: No acute osseous changes.       1. Interval worsening pulmonary edema. Increased hazy opacities in bilateral lower lungs, right-greater-than-left may represent component of worsening pleural effusion with adjacent atelectasis/infiltrate. 2. Medical devices as above.   I personally reviewed the images/study and I agree with the findings as stated by resident physician Dr. Ga Noel . This study was interpreted at [facility], [place], [place].     MACRO: None   Signed by: Bubba Levin 4/2/2024 12:41 PM Dictation workstation:   [id number]    Transthoracic Echo (TTE) Limited    Result Date: 4/2/2024   [facility], [address] [place] [address]                Tel [phone] and Fax [phone] TRANSTHORACIC ECHOCARDIOGRAM REPORT  Patient Name:      YVETTE Clinton Physician:    [id number] Jennifer Gomez                                                                MD Study Date:        4/2/2024             Ordering Provider:    [id number] JARRED ADAMS MRN/PID:           [id number]             Fellow:               [id number] Alessandro Wise MD Accession#:        [id number]         Nurse: Date of Birth/Age: 1960 / 63 years Sonographer:          CHIDI Hong RDCS Gender:            M                    Additional Staff: Height:            177.80 cm            Admit Date:           2/28/2024 Weight:            124.74 kg            Admission Status:     Inpatient -                                                               Routine BSA / BMI:         2.39 m2 / 39.46      Encounter#:           [id number]                    kg/m2                                         Department Location:  [facility] Blood Pressure: 134 /55 mmHg Study Type:    TRANSTHORACIC ECHO (TTE) LIMITED Diagnosis/ICD: Shock, unspecified-R57.9 Indication:    Shock CPT Code:      Echo Limited-20106 Patient History: Pertinent History: HLD, anemia, sarcoma, DM, obesity, s/p cardiac arrest 2/28/24                    w/ RUTH, shock and resp failure. Study Detail: The following Echo studies were performed: 2D and M-Mode.               Technically challenging study due to patient lying in supine               position, a tracheostomy present and poor acoustic windows. The               patient is intubated. Definity used as a contrast agent for               endocardial border definition. Total contrast used for this               procedure was 1 mL via IV push. The patient was awake.  PHYSICIAN INTERPRETATION: Left Ventricle: The left ventricular systolic function is normal, with an estimated ejection fraction of 65-70%. There are no regional wall motion abnormalities. The left ventricular cavity size is normal. The  interventricular septum is flattened in diastole ('D' shaped left ventricle), consistent with right ventricular volume overload. Left ventricular diastolic filling was not assessed. Left Atrium: The left atrium is normal in size. Right Ventricle: The right ventricle was not well visualized. There is reduced right ventricular systolic function. RV not well seen: in some views possibly dilated with mildly reduced function, but not quantified. Right Atrium: The right atrium is normal in size. Aortic Valve: The aortic valve was not well visualized. Aortic valve regurgitation was not assessed. Mitral Valve: The mitral valve is normal in structure. Mitral valve regurgitation was not assessed. Tricuspid Valve: The tricuspid valve is structurally normal. Tricuspid regurgitation was not assessed. Pulmonic Valve: The pulmonic valve is not well visualized. Pulmonic valve regurgitation was not assessed. Pericardium: There is a trivial pericardial effusion. Aorta: The aortic root is abnormal. There is mild dilatation of the ascending aorta. There is mild dilatation of the aortic root. Systemic Veins: The inferior vena cava appears dilated. Mechanically ventilated. In comparison to the previous echocardiogram(s): Compared with study from 3/29/2024,. Compared with the prior exam from 3/29/2024, both studies were technically difficult and limited. Overall no significant changes.  CONCLUSIONS:  1. Left ventricular systolic function is normal with a 65-70% estimated ejection fraction.  2. Poorly visualized anatomical structures due to suboptimal image quality.  3. Right ventricular volume overload.  4. RV not well seen: in some views possibly dilated with mildly reduced function, but not quantified.  5. There is reduced right ventricular systolic function.  6. Compared with the prior exam from 3/29/2024, both studies were technically difficult and limited. Overall no significant changes. QUANTITATIVE DATA SUMMARY: 2D MEASUREMENTS:                           Normal Ranges: Ao Root d:     3.50 cm   (2.0-3.7cm) LAs:           5.10 cm   (2.7-4.0cm) RVIDd:         4.40 cm   (0.9-3.6cm) IVSd:          0.90 cm   (0.6-1.1cm) LVPWd:         0.90 cm   (0.6-1.1cm) LVIDd:         5.20 cm   (3.9-5.9cm) LVIDs:         3.50 cm LV Mass Index: 70.7 g/m2 LV % FS        32.7 % LA VOLUME:                               Normal Ranges: LA Vol A4C:        72.5 ml    (22+/-6mL/m2) LA Vol A2C:        46.9 ml LA Vol BP:         67.0 ml LA Vol Index A4C:  30.3ml/m2 LA Vol Index A2C:  19.6 ml/m2 LA Vol Index BP:   28.0 ml/m2 LA Area A4C:       23.0 cm2 LA Area A2C:       16.1 cm2 LA Major Axis A4C: 6.2 cm LA Major Axis A2C: 4.7 cm AORTA MEASUREMENTS:                      Normal Ranges: Ao Sinus, d: 3.50 cm (2.1-3.5cm) Asc Ao, d:   3.50 cm (2.1-3.4cm) LV SYSTOLIC FUNCTION BY 2D PLANIMETRY (MOD):                     Normal Ranges: EF-A4C View: 65.5 % (>=55%) EF-A2C View: 72.1 % EF-Biplane:  69.2 % AORTIC VALVE:                        Normal Ranges: LVOT Diameter: 2.30 cm (1.8-2.4cm)  [id number] Jennifer Gomez MD Electronically signed on 4/2/2024 at 10:29:38 AM  ** Final **     US right upper quadrant    Result Date: 4/2/2024  Interpreted By:  Justino Burroughs and O'Connor Gregory STUDY: US RIGHT UPPER QUADRANT;  4/1/2024 2:22 pm   INDICATION: Signs/Symptoms:worsening hyperbilirubinemia.   COMPARISON: CT chest abdomen pelvis dated 03/26/2024   ACCESSION NUMBER(S): [id number]   ORDERING CLINICIAN: PAPITO VILLEGAS   TECHNIQUE: Multiple images of the right upper quadrant were obtained.  This examination was interpreted at [facility].   FINDINGS: Please note the examination is limited due to body habitus and bowel gas.   LIVER: The liver measures 19.83 cm and is diffusely heterogenous and predominantly echogenic in appearance with a somewhat nodular contour to the liver. Irregular appearing hypoechoic region in the right hepatic lobe near  the confluence of the hepatic veins and the portal vein without internal flow. There is no corresponding abnormality visualized on the prior CT from 03/26/2024, although that evaluation is limited due to attenuation artifact.     GALLBLADDER: There is nonspecific gallbladder wall thickening and edema measuring up to 1.21 cm in thickness. Sonographic Sanchez's sign is negative. No gallbladder distension or pericholecystic fluid. No cholelithiasis.     BILE DUCTS: No evidence of intra or extrahepatic biliary dilatation is identified; the common bile duct measures 0.41 cm.   PANCREAS: The visualized pancreas is unremarkable in appearance.   RIGHT KIDNEY: The right kidney measures 13.07 cm in length. The renal cortical echogenicity and thickness are within normal limit.  No hydronephrosis or renal calculi are seen.   Other: Moderate-to-large right-sided pleural effusion. Trace abdominal ascites is seen.       1. Nonspecific gallbladder wall edema and thickening may relate to generalized fluid overload. No cholelithiasis or gallbladder distention. 2. Moderate-sized pleural effusion and trace abdominal ascites. 3. Mild hepatomegaly with heterogenous echotexture and slightly nodular contour, please correlate with concern for steatosis and fibrosis. 4. Irregular hypoechoic region adjacent to the hepatic veins prior to their confluence with the IVC, of uncertain significance. Consider MRI for further characterization if clinical concern persists.   I personally reviewed the images/study and I agree with the findings as stated by resident physician Dr. Ruddy Merchant.   MACRO: None   Signed by: Justino Spicer 4/2/2024 2:12 AM Dictation workstation:   [id number]        Current Medications  Scheduled medications  esomeprazole, 40 mg, nasogastric tube, Daily before breakfast  fludrocortisone, 0.1 mg, nasogastric tube, q12h  hydrocortisone sodium succinate, 50 mg, intravenous, q12h  insulin glargine, 10 Units,  subcutaneous, q12h  insulin lispro, 0-10 Units, subcutaneous, q4h  midodrine, 20 mg, orogastric tube, q8h  thiamine, 100 mg, intravenous, Daily      Continuous medications  dexmedeTOMIDine, 0.2-1.5 mcg/kg/hr, Last Rate: 0.4 mcg/kg/hr (04/03/24 0422)  [Held by provider] heparin, 0-4,000 Units/hr, Last Rate: Stopped (04/03/24 0425)  lactated Ringer's, 5 mL/hr, Last Rate: 5 mL/hr (04/03/24 0400)  norepinephrine, 0.01-0.1 mcg/kg/min (Dosing Weight), Last Rate: 0.09 mcg/kg/min (04/03/24 0654)      PRN medications  PRN medications: alteplase, calcium gluconate, calcium gluconate, dextrose **OR** glucagon, magnesium sulfate, magnesium sulfate, oxygen     Assessment  Jason Hollins is a 63 y.o. 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 Hawkins and Dr. Smith on 3/5/24. Post operative course c/b arrest requiring CPR with ROSC after TPA for assumed large PE on 3/20. Increased swelling at flap site appreciated overnight 3/20-3/21 and pt returned to OR for exploration of R flap site, evacuation of hematoma, suture ligation of multiple bleeding vessel sites in gluteal area and wound closure with Dr. Smith on 3/21. Pt has remained in CTICU for continued critical care evaluation and management postoperatively.     Plan/Recommendations  - Appreciate ongoing ICU evaluation and management following acute clinical decompensation event 3/20  - Maintain incisional wound vac to right gluteal/thigh area with plans to remove tomorrow 4/4/24.       ·  Settings: -75mmHg low continuous suction        ·  Notify plastic surgery with any concerns of leak or obstruction   - Monitor right thigh for s/s of bleeding recurrence, has been stable on serial assessments of past week   - Continue ANDERSON drain care to x3 drains present at R thigh  flap reconstruction site      ·  Strip drain tubing TID and PRN     ·  Monitor and record output q8h    Drain output 4/2: 75 total; #1-25, #2-10, #3-40     ·  Keep drain sites C/D/I with daily drain dressing changes      ·  Notify plastics if ANDERSON drain output exceeds > 100 ml/hr in one drain or > 300 ml/hr collectively  - Plastic Surgery will continue to follow     Patient and plan discussed with Grace Moreno PA-C  Plastic and Reconstructive Surgery   [place]  Pager #[phone]  Team phones: [phone]

## 2024-04-04 ENCOUNTER — APPOINTMENT (OUTPATIENT)
Dept: RADIOLOGY | Facility: HOSPITAL | Age: 64
DRG: 003 | End: 2024-04-04
Payer: COMMERCIAL

## 2024-04-04 ENCOUNTER — ANESTHESIA EVENT (OUTPATIENT)
Dept: OPERATING ROOM | Facility: HOSPITAL | Age: 64
DRG: 003 | End: 2024-04-04
Payer: COMMERCIAL

## 2024-04-04 ENCOUNTER — ANESTHESIA (OUTPATIENT)
Dept: OPERATING ROOM | Facility: HOSPITAL | Age: 64
DRG: 003 | End: 2024-04-04
Payer: COMMERCIAL

## 2024-04-04 PROBLEM — J95.01: Status: ACTIVE | Noted: 2024-02-28

## 2024-04-04 LAB
ALBUMIN SERPL BCP-MCNC: 3.1 G/DL (ref 3.4–5)
ALBUMIN SERPL BCP-MCNC: 3.2 G/DL (ref 3.4–5)
ALBUMIN SERPL BCP-MCNC: 3.4 G/DL (ref 3.4–5)
ALP SERPL-CCNC: 171 U/L (ref 33–136)
ALP SERPL-CCNC: 193 U/L (ref 33–136)
ALT SERPL W P-5'-P-CCNC: 108 U/L (ref 10–52)
ALT SERPL W P-5'-P-CCNC: 92 U/L (ref 10–52)
ANION GAP BLDA CALCULATED.4IONS-SCNC: 10 MMO/L (ref 10–25)
ANION GAP BLDA CALCULATED.4IONS-SCNC: 11 MMO/L (ref 10–25)
ANION GAP BLDA CALCULATED.4IONS-SCNC: 13 MMO/L (ref 10–25)
ANION GAP BLDA CALCULATED.4IONS-SCNC: 13 MMO/L (ref 10–25)
ANION GAP BLDA CALCULATED.4IONS-SCNC: 16 MMO/L (ref 10–25)
ANION GAP SERPL CALC-SCNC: 18 MMOL/L (ref 10–20)
ANION GAP SERPL CALC-SCNC: 21 MMOL/L (ref 10–20)
ANION GAP SERPL CALC-SCNC: 21 MMOL/L (ref 10–20)
APTT PPP: 35 SECONDS (ref 27–38)
APTT PPP: 39 SECONDS (ref 27–38)
APTT PPP: 40 SECONDS (ref 27–38)
AST SERPL W P-5'-P-CCNC: 118 U/L (ref 9–39)
AST SERPL W P-5'-P-CCNC: 96 U/L (ref 9–39)
BASE EXCESS BLDA CALC-SCNC: -2.3 MMOL/L (ref -2–3)
BASE EXCESS BLDA CALC-SCNC: -2.8 MMOL/L (ref -2–3)
BASE EXCESS BLDA CALC-SCNC: -3.4 MMOL/L (ref -2–3)
BASE EXCESS BLDA CALC-SCNC: -3.4 MMOL/L (ref -2–3)
BASE EXCESS BLDA CALC-SCNC: -4.1 MMOL/L (ref -2–3)
BASE EXCESS BLDA CALC-SCNC: -9.3 MMOL/L (ref -2–3)
BASE EXCESS BLDA CALC-SCNC: 0 MMOL/L (ref -2–3)
BILIRUB DIRECT SERPL-MCNC: 19.5 MG/DL (ref 0–0.3)
BILIRUB DIRECT SERPL-MCNC: 19.8 MG/DL (ref 0–0.3)
BILIRUB SERPL-MCNC: 26.1 MG/DL (ref 0–1.2)
BILIRUB SERPL-MCNC: 28.1 MG/DL (ref 0–1.2)
BLOOD EXPIRATION DATE: NORMAL
BODY TEMPERATURE: 37 DEGREES CELSIUS
BUN SERPL-MCNC: 46 MG/DL (ref 6–23)
BUN SERPL-MCNC: 55 MG/DL (ref 6–23)
BUN SERPL-MCNC: 57 MG/DL (ref 6–23)
CA-I BLD-SCNC: 0.92 MMOL/L (ref 1.1–1.33)
CA-I BLD-SCNC: 0.96 MMOL/L (ref 1.1–1.33)
CA-I BLD-SCNC: 1.04 MMOL/L (ref 1.1–1.33)
CA-I BLDA-SCNC: 0.83 MMOL/L (ref 1.1–1.33)
CA-I BLDA-SCNC: 1.05 MMOL/L (ref 1.1–1.33)
CA-I BLDA-SCNC: 1.06 MMOL/L (ref 1.1–1.33)
CA-I BLDA-SCNC: 1.06 MMOL/L (ref 1.1–1.33)
CA-I BLDA-SCNC: 1.1 MMOL/L (ref 1.1–1.33)
CA-I BLDA-SCNC: 1.11 MMOL/L (ref 1.1–1.33)
CA-I BLDA-SCNC: 1.13 MMOL/L (ref 1.1–1.33)
CALCIUM SERPL-MCNC: 7.6 MG/DL (ref 8.6–10.6)
CALCIUM SERPL-MCNC: 7.8 MG/DL (ref 8.6–10.6)
CALCIUM SERPL-MCNC: 8.2 MG/DL (ref 8.6–10.6)
CFT FORM KAOLIN IND BLD RES TEG: 1.8 MIN (ref 0.8–2.1)
CFT FORM KAOLIN IND BLD RES TEG: 1.8 MIN (ref 0.8–2.1)
CHLORIDE BLDA-SCNC: 110 MMOL/L (ref 98–107)
CHLORIDE BLDA-SCNC: 95 MMOL/L (ref 98–107)
CHLORIDE BLDA-SCNC: 95 MMOL/L (ref 98–107)
CHLORIDE BLDA-SCNC: 96 MMOL/L (ref 98–107)
CHLORIDE BLDA-SCNC: 96 MMOL/L (ref 98–107)
CHLORIDE BLDA-SCNC: 98 MMOL/L (ref 98–107)
CHLORIDE BLDA-SCNC: 98 MMOL/L (ref 98–107)
CHLORIDE SERPL-SCNC: 93 MMOL/L (ref 98–107)
CHLORIDE SERPL-SCNC: 95 MMOL/L (ref 98–107)
CHLORIDE SERPL-SCNC: 97 MMOL/L (ref 98–107)
CLOT ANGLE.KAOLIN INDUCED BLD RES TEG: 69.6 DEG (ref 63–78)
CLOT ANGLE.KAOLIN INDUCED BLD RES TEG: 70 DEG (ref 63–78)
CLOT INIT KAO IND P HEP NEUT BLD RES TEG: 10 MIN (ref 4.3–8.3)
CLOT INIT KAO IND P HEP NEUT BLD RES TEG: 12.4 MIN (ref 4.6–9.1)
CLOT INIT KAO IND P HEP NEUT BLD RES TEG: 9.7 MIN (ref 4.3–8.3)
CLOT INIT KAO IND P HEP NEUT BLD RES TEG: 9.8 MIN (ref 4.6–9.1)
CO2 SERPL-SCNC: 21 MMOL/L (ref 21–32)
CO2 SERPL-SCNC: 22 MMOL/L (ref 21–32)
CO2 SERPL-SCNC: 23 MMOL/L (ref 21–32)
CREAT SERPL-MCNC: 2.08 MG/DL (ref 0.5–1.3)
CREAT SERPL-MCNC: 2.44 MG/DL (ref 0.5–1.3)
CREAT SERPL-MCNC: 2.6 MG/DL (ref 0.5–1.3)
DISPENSE STATUS: NORMAL
EGFRCR SERPLBLD CKD-EPI 2021: 27 ML/MIN/1.73M*2
EGFRCR SERPLBLD CKD-EPI 2021: 29 ML/MIN/1.73M*2
EGFRCR SERPLBLD CKD-EPI 2021: 35 ML/MIN/1.73M*2
ERYTHROCYTE [DISTWIDTH] IN BLOOD BY AUTOMATED COUNT: 19.6 % (ref 11.5–14.5)
ERYTHROCYTE [DISTWIDTH] IN BLOOD BY AUTOMATED COUNT: 19.9 % (ref 11.5–14.5)
ERYTHROCYTE [DISTWIDTH] IN BLOOD BY AUTOMATED COUNT: 19.9 % (ref 11.5–14.5)
ERYTHROCYTE [DISTWIDTH] IN BLOOD BY AUTOMATED COUNT: 20.4 % (ref 11.5–14.5)
ERYTHROCYTE [DISTWIDTH] IN BLOOD BY AUTOMATED COUNT: 20.6 % (ref 11.5–14.5)
FIBRINOGEN BLD CALC-MCNC: 383 MG/DL (ref 278–581)
FIBRINOGEN BLD CALC-MCNC: 400 MG/DL (ref 278–581)
FIBRINOGEN PPP-MCNC: 270 MG/DL (ref 200–400)
GLUCOSE BLD MANUAL STRIP-MCNC: 163 MG/DL (ref 74–99)
GLUCOSE BLD MANUAL STRIP-MCNC: 179 MG/DL (ref 74–99)
GLUCOSE BLD MANUAL STRIP-MCNC: 217 MG/DL (ref 74–99)
GLUCOSE BLD MANUAL STRIP-MCNC: 245 MG/DL (ref 74–99)
GLUCOSE BLD MANUAL STRIP-MCNC: 262 MG/DL (ref 74–99)
GLUCOSE BLDA-MCNC: 167 MG/DL (ref 74–99)
GLUCOSE BLDA-MCNC: 190 MG/DL (ref 74–99)
GLUCOSE BLDA-MCNC: 225 MG/DL (ref 74–99)
GLUCOSE BLDA-MCNC: 229 MG/DL (ref 74–99)
GLUCOSE BLDA-MCNC: 241 MG/DL (ref 74–99)
GLUCOSE BLDA-MCNC: 267 MG/DL (ref 74–99)
GLUCOSE BLDA-MCNC: 267 MG/DL (ref 74–99)
GLUCOSE SERPL-MCNC: 218 MG/DL (ref 74–99)
GLUCOSE SERPL-MCNC: 243 MG/DL (ref 74–99)
GLUCOSE SERPL-MCNC: 250 MG/DL (ref 74–99)
HCO3 BLDA-SCNC: 16 MMOL/L (ref 22–26)
HCO3 BLDA-SCNC: 21.6 MMOL/L (ref 22–26)
HCO3 BLDA-SCNC: 22.7 MMOL/L (ref 22–26)
HCO3 BLDA-SCNC: 23.2 MMOL/L (ref 22–26)
HCO3 BLDA-SCNC: 23.2 MMOL/L (ref 22–26)
HCO3 BLDA-SCNC: 24.3 MMOL/L (ref 22–26)
HCO3 BLDA-SCNC: 24.8 MMOL/L (ref 22–26)
HCT VFR BLD AUTO: 21.9 % (ref 41–52)
HCT VFR BLD AUTO: 22.7 % (ref 41–52)
HCT VFR BLD AUTO: 23.2 % (ref 41–52)
HCT VFR BLD AUTO: 23.3 % (ref 41–52)
HCT VFR BLD AUTO: 24.7 % (ref 41–52)
HCT VFR BLD EST: 18 % (ref 41–52)
HCT VFR BLD EST: 25 % (ref 41–52)
HCT VFR BLD EST: 26 % (ref 41–52)
HCT VFR BLD EST: 26 % (ref 41–52)
HCT VFR BLD EST: 27 % (ref 41–52)
HGB BLD-MCNC: 7.5 G/DL (ref 13.5–17.5)
HGB BLD-MCNC: 7.6 G/DL (ref 13.5–17.5)
HGB BLD-MCNC: 7.9 G/DL (ref 13.5–17.5)
HGB BLD-MCNC: 7.9 G/DL (ref 13.5–17.5)
HGB BLD-MCNC: 8.2 G/DL (ref 13.5–17.5)
HGB BLDA-MCNC: 6.1 G/DL (ref 13.5–17.5)
HGB BLDA-MCNC: 8.3 G/DL (ref 13.5–17.5)
HGB BLDA-MCNC: 8.4 G/DL (ref 13.5–17.5)
HGB BLDA-MCNC: 8.4 G/DL (ref 13.5–17.5)
HGB BLDA-MCNC: 8.6 G/DL (ref 13.5–17.5)
HGB BLDA-MCNC: 8.8 G/DL (ref 13.5–17.5)
HGB BLDA-MCNC: 8.9 G/DL (ref 13.5–17.5)
INHALED O2 CONCENTRATION: 100 %
INHALED O2 CONCENTRATION: 50 %
INHALED O2 CONCENTRATION: 60 %
INHALED O2 CONCENTRATION: 70 %
INHALED O2 CONCENTRATION: 80 %
INR PPP: 1.2 (ref 0.9–1.1)
INR PPP: 1.3 (ref 0.9–1.1)
INR PPP: 1.5 (ref 0.9–1.1)
LACTATE BLDA-SCNC: 1.7 MMOL/L (ref 0.4–2)
LACTATE BLDA-SCNC: 1.7 MMOL/L (ref 0.4–2)
LACTATE BLDA-SCNC: 2.3 MMOL/L (ref 0.4–2)
LACTATE BLDA-SCNC: 2.4 MMOL/L (ref 0.4–2)
LACTATE BLDA-SCNC: 2.8 MMOL/L (ref 0.4–2)
LACTATE BLDA-SCNC: 2.9 MMOL/L (ref 0.4–2)
LACTATE BLDA-SCNC: 2.9 MMOL/L (ref 0.4–2)
MA KAOLIN BLD RES TEG: 56 MM (ref 52–69)
MA KAOLIN BLD RES TEG: 56.3 MM (ref 52–69)
MA KAOLIN+TF BLD RES TEG: 55 MM (ref 52–70)
MA KAOLIN+TF BLD RES TEG: 56.8 MM (ref 52–70)
MA TF IND+IIB-IIIA INH BLD RES TEG: 21 MM (ref 15–32)
MA TF IND+IIB-IIIA INH BLD RES TEG: 21.9 MM (ref 15–32)
MAGNESIUM SERPL-MCNC: 2.11 MG/DL (ref 1.6–2.4)
MAGNESIUM SERPL-MCNC: 2.13 MG/DL (ref 1.6–2.4)
MAGNESIUM SERPL-MCNC: 2.17 MG/DL (ref 1.6–2.4)
MCH RBC QN AUTO: 29.2 PG (ref 26–34)
MCH RBC QN AUTO: 29.6 PG (ref 26–34)
MCH RBC QN AUTO: 29.7 PG (ref 26–34)
MCH RBC QN AUTO: 29.8 PG (ref 26–34)
MCH RBC QN AUTO: 30 PG (ref 26–34)
MCHC RBC AUTO-ENTMCNC: 33.2 G/DL (ref 32–36)
MCHC RBC AUTO-ENTMCNC: 33.5 G/DL (ref 32–36)
MCHC RBC AUTO-ENTMCNC: 33.9 G/DL (ref 32–36)
MCHC RBC AUTO-ENTMCNC: 34.1 G/DL (ref 32–36)
MCHC RBC AUTO-ENTMCNC: 34.2 G/DL (ref 32–36)
MCV RBC AUTO: 87 FL (ref 80–100)
MCV RBC AUTO: 88 FL (ref 80–100)
MCV RBC AUTO: 89 FL (ref 80–100)
NRBC BLD-RTO: 0.6 /100 WBCS (ref 0–0)
NRBC BLD-RTO: 1.1 /100 WBCS (ref 0–0)
NRBC BLD-RTO: 1.7 /100 WBCS (ref 0–0)
NRBC BLD-RTO: 1.8 /100 WBCS (ref 0–0)
NRBC BLD-RTO: 1.9 /100 WBCS (ref 0–0)
OXYHGB MFR BLDA: 85.2 % (ref 94–98)
OXYHGB MFR BLDA: 95.6 % (ref 94–98)
OXYHGB MFR BLDA: 96.2 % (ref 94–98)
OXYHGB MFR BLDA: 96.3 % (ref 94–98)
OXYHGB MFR BLDA: 96.8 % (ref 94–98)
OXYHGB MFR BLDA: 96.9 % (ref 94–98)
OXYHGB MFR BLDA: 97.1 % (ref 94–98)
PCO2 BLDA: 31 MM HG (ref 38–42)
PCO2 BLDA: 40 MM HG (ref 38–42)
PCO2 BLDA: 41 MM HG (ref 38–42)
PCO2 BLDA: 42 MM HG (ref 38–42)
PCO2 BLDA: 45 MM HG (ref 38–42)
PCO2 BLDA: 45 MM HG (ref 38–42)
PCO2 BLDA: 58 MM HG (ref 38–42)
PH BLDA: 7.23 PH (ref 7.38–7.42)
PH BLDA: 7.31 PH (ref 7.38–7.42)
PH BLDA: 7.32 PH (ref 7.38–7.42)
PH BLDA: 7.32 PH (ref 7.38–7.42)
PH BLDA: 7.33 PH (ref 7.38–7.42)
PH BLDA: 7.35 PH (ref 7.38–7.42)
PH BLDA: 7.4 PH (ref 7.38–7.42)
PHOSPHATE SERPL-MCNC: 4.9 MG/DL (ref 2.5–4.9)
PHOSPHATE SERPL-MCNC: 5.2 MG/DL (ref 2.5–4.9)
PHOSPHATE SERPL-MCNC: 5.2 MG/DL (ref 2.5–4.9)
PLATELET # BLD AUTO: 48 X10*3/UL (ref 150–450)
PLATELET # BLD AUTO: 51 X10*3/UL (ref 150–450)
PLATELET # BLD AUTO: 53 X10*3/UL (ref 150–450)
PLATELET # BLD AUTO: 57 X10*3/UL (ref 150–450)
PLATELET # BLD AUTO: 68 X10*3/UL (ref 150–450)
PO2 BLDA: 108 MM HG (ref 85–95)
PO2 BLDA: 114 MM HG (ref 85–95)
PO2 BLDA: 137 MM HG (ref 85–95)
PO2 BLDA: 140 MM HG (ref 85–95)
PO2 BLDA: 153 MM HG (ref 85–95)
PO2 BLDA: 55 MM HG (ref 85–95)
PO2 BLDA: 88 MM HG (ref 85–95)
POTASSIUM BLDA-SCNC: 3.5 MMOL/L (ref 3.5–5.3)
POTASSIUM BLDA-SCNC: 4.4 MMOL/L (ref 3.5–5.3)
POTASSIUM BLDA-SCNC: 5.1 MMOL/L (ref 3.5–5.3)
POTASSIUM BLDA-SCNC: 5.2 MMOL/L (ref 3.5–5.3)
POTASSIUM BLDA-SCNC: 5.2 MMOL/L (ref 3.5–5.3)
POTASSIUM BLDA-SCNC: 5.3 MMOL/L (ref 3.5–5.3)
POTASSIUM BLDA-SCNC: 5.4 MMOL/L (ref 3.5–5.3)
POTASSIUM SERPL-SCNC: 4.9 MMOL/L (ref 3.5–5.3)
POTASSIUM SERPL-SCNC: 5 MMOL/L (ref 3.5–5.3)
POTASSIUM SERPL-SCNC: 5.2 MMOL/L (ref 3.5–5.3)
PRODUCT BLOOD TYPE: 5100
PRODUCT BLOOD TYPE: 5100
PRODUCT BLOOD TYPE: 6200
PRODUCT CODE: NORMAL
PROT SERPL-MCNC: 4.9 G/DL (ref 6.4–8.2)
PROT SERPL-MCNC: 5.2 G/DL (ref 6.4–8.2)
PROTHROMBIN TIME: 14 SECONDS (ref 9.8–12.8)
PROTHROMBIN TIME: 14.7 SECONDS (ref 9.8–12.8)
PROTHROMBIN TIME: 17.5 SECONDS (ref 9.8–12.8)
RBC # BLD AUTO: 2.5 X10*6/UL (ref 4.5–5.9)
RBC # BLD AUTO: 2.56 X10*6/UL (ref 4.5–5.9)
RBC # BLD AUTO: 2.65 X10*6/UL (ref 4.5–5.9)
RBC # BLD AUTO: 2.67 X10*6/UL (ref 4.5–5.9)
RBC # BLD AUTO: 2.81 X10*6/UL (ref 4.5–5.9)
SAO2 % BLDA: 100 % (ref 94–100)
SAO2 % BLDA: 88 % (ref 94–100)
SAO2 % BLDA: 99 % (ref 94–100)
SODIUM BLDA-SCNC: 126 MMOL/L (ref 136–145)
SODIUM BLDA-SCNC: 127 MMOL/L (ref 136–145)
SODIUM BLDA-SCNC: 128 MMOL/L (ref 136–145)
SODIUM BLDA-SCNC: 128 MMOL/L (ref 136–145)
SODIUM BLDA-SCNC: 129 MMOL/L (ref 136–145)
SODIUM BLDA-SCNC: 130 MMOL/L (ref 136–145)
SODIUM BLDA-SCNC: 135 MMOL/L (ref 136–145)
SODIUM SERPL-SCNC: 132 MMOL/L (ref 136–145)
UNIT ABO: NORMAL
UNIT NUMBER: NORMAL
UNIT RH: NORMAL
UNIT VOLUME: 259
UNIT VOLUME: 288
UNIT VOLUME: 350
VANCOMYCIN SERPL-MCNC: 12.3 UG/ML (ref 5–20)
WBC # BLD AUTO: 2.8 X10*3/UL (ref 4.4–11.3)
WBC # BLD AUTO: 2.9 X10*3/UL (ref 4.4–11.3)
WBC # BLD AUTO: 3.5 X10*3/UL (ref 4.4–11.3)
WBC # BLD AUTO: 4.6 X10*3/UL (ref 4.4–11.3)
WBC # BLD AUTO: 4.8 X10*3/UL (ref 4.4–11.3)
XM INTEP: NORMAL
XM INTEP: NORMAL

## 2024-04-04 PROCEDURE — 80202 ASSAY OF VANCOMYCIN: CPT

## 2024-04-04 PROCEDURE — 99233 SBSQ HOSP IP/OBS HIGH 50: CPT | Performed by: NURSE PRACTITIONER

## 2024-04-04 PROCEDURE — P9045 ALBUMIN (HUMAN), 5%, 250 ML: HCPCS | Mod: JZ | Performed by: NURSE ANESTHETIST, CERTIFIED REGISTERED

## 2024-04-04 PROCEDURE — 99233 SBSQ HOSP IP/OBS HIGH 50: CPT | Performed by: STUDENT IN AN ORGANIZED HEALTH CARE EDUCATION/TRAINING PROGRAM

## 2024-04-04 PROCEDURE — C9113 INJ PANTOPRAZOLE SODIUM, VIA: HCPCS | Performed by: NURSE PRACTITIONER

## 2024-04-04 PROCEDURE — P9040 RBC LEUKOREDUCED IRRADIATED: HCPCS

## 2024-04-04 PROCEDURE — 2500000004 HC RX 250 GENERAL PHARMACY W/ HCPCS (ALT 636 FOR OP/ED)

## 2024-04-04 PROCEDURE — 85384 FIBRINOGEN ACTIVITY: CPT | Performed by: NURSE PRACTITIONER

## 2024-04-04 PROCEDURE — 84100 ASSAY OF PHOSPHORUS: CPT | Performed by: NURSE PRACTITIONER

## 2024-04-04 PROCEDURE — 85610 PROTHROMBIN TIME: CPT | Performed by: NURSE PRACTITIONER

## 2024-04-04 PROCEDURE — 2720000007 HC OR 272 NO HCPCS: Performed by: OTOLARYNGOLOGY

## 2024-04-04 PROCEDURE — 85027 COMPLETE CBC AUTOMATED: CPT | Performed by: NURSE PRACTITIONER

## 2024-04-04 PROCEDURE — 85027 COMPLETE CBC AUTOMATED: CPT

## 2024-04-04 PROCEDURE — 2500000004 HC RX 250 GENERAL PHARMACY W/ HCPCS (ALT 636 FOR OP/ED): Performed by: NURSE PRACTITIONER

## 2024-04-04 PROCEDURE — 2500000004 HC RX 250 GENERAL PHARMACY W/ HCPCS (ALT 636 FOR OP/ED): Performed by: OTOLARYNGOLOGY

## 2024-04-04 PROCEDURE — A4217 STERILE WATER/SALINE, 500 ML: HCPCS

## 2024-04-04 PROCEDURE — 99232 SBSQ HOSP IP/OBS MODERATE 35: CPT | Performed by: STUDENT IN AN ORGANIZED HEALTH CARE EDUCATION/TRAINING PROGRAM

## 2024-04-04 PROCEDURE — 71045 X-RAY EXAM CHEST 1 VIEW: CPT | Performed by: RADIOLOGY

## 2024-04-04 PROCEDURE — 84132 ASSAY OF SERUM POTASSIUM: CPT | Performed by: NURSE PRACTITIONER

## 2024-04-04 PROCEDURE — 82947 ASSAY GLUCOSE BLOOD QUANT: CPT

## 2024-04-04 PROCEDURE — 82330 ASSAY OF CALCIUM: CPT | Performed by: NURSE PRACTITIONER

## 2024-04-04 PROCEDURE — 70498 CT ANGIOGRAPHY NECK: CPT

## 2024-04-04 PROCEDURE — 99291 CRITICAL CARE FIRST HOUR: CPT | Performed by: ANESTHESIOLOGY

## 2024-04-04 PROCEDURE — 20100 EXPL PENTRG WOUND NECK: CPT | Performed by: OTOLARYNGOLOGY

## 2024-04-04 PROCEDURE — 85730 THROMBOPLASTIN TIME PARTIAL: CPT

## 2024-04-04 PROCEDURE — 2500000002 HC RX 250 W HCPCS SELF ADMINISTERED DRUGS (ALT 637 FOR MEDICARE OP, ALT 636 FOR OP/ED): Performed by: NURSE PRACTITIONER

## 2024-04-04 PROCEDURE — 2550000001 HC RX 255 CONTRASTS

## 2024-04-04 PROCEDURE — 36430 TRANSFUSION BLD/BLD COMPNT: CPT

## 2024-04-04 PROCEDURE — 94799 UNLISTED PULMONARY SVC/PX: CPT

## 2024-04-04 PROCEDURE — A35800 PR EXPLOR POSTOP BLEED,INFEC,CLOT-NECK: Performed by: ANESTHESIOLOGY

## 2024-04-04 PROCEDURE — P9017 PLASMA 1 DONOR FRZ W/IN 8 HR: HCPCS

## 2024-04-04 PROCEDURE — 2500000005 HC RX 250 GENERAL PHARMACY W/O HCPCS

## 2024-04-04 PROCEDURE — 2500000004 HC RX 250 GENERAL PHARMACY W/ HCPCS (ALT 636 FOR OP/ED): Performed by: STUDENT IN AN ORGANIZED HEALTH CARE EDUCATION/TRAINING PROGRAM

## 2024-04-04 PROCEDURE — 3700000002 HC GENERAL ANESTHESIA TIME - EACH INCREMENTAL 1 MINUTE: Performed by: OTOLARYNGOLOGY

## 2024-04-04 PROCEDURE — 82248 BILIRUBIN DIRECT: CPT

## 2024-04-04 PROCEDURE — 31622 DX BRONCHOSCOPE/WASH: CPT | Performed by: OTOLARYNGOLOGY

## 2024-04-04 PROCEDURE — 37799 UNLISTED PX VASCULAR SURGERY: CPT

## 2024-04-04 PROCEDURE — 43200 ESOPHAGOSCOPY FLEXIBLE BRUSH: CPT | Performed by: OTOLARYNGOLOGY

## 2024-04-04 PROCEDURE — 84100 ASSAY OF PHOSPHORUS: CPT

## 2024-04-04 PROCEDURE — 84132 ASSAY OF SERUM POTASSIUM: CPT

## 2024-04-04 PROCEDURE — 2500000004 HC RX 250 GENERAL PHARMACY W/ HCPCS (ALT 636 FOR OP/ED): Mod: JZ

## 2024-04-04 PROCEDURE — 85384 FIBRINOGEN ACTIVITY: CPT | Performed by: STUDENT IN AN ORGANIZED HEALTH CARE EDUCATION/TRAINING PROGRAM

## 2024-04-04 PROCEDURE — 2500000004 HC RX 250 GENERAL PHARMACY W/ HCPCS (ALT 636 FOR OP/ED): Mod: JZ | Performed by: NURSE ANESTHETIST, CERTIFIED REGISTERED

## 2024-04-04 PROCEDURE — 37799 UNLISTED PX VASCULAR SURGERY: CPT | Performed by: STUDENT IN AN ORGANIZED HEALTH CARE EDUCATION/TRAINING PROGRAM

## 2024-04-04 PROCEDURE — 0BC38ZZ EXTIRPATION OF MATTER FROM RIGHT MAIN BRONCHUS, VIA NATURAL OR ARTIFICIAL OPENING ENDOSCOPIC: ICD-10-PCS | Performed by: OTOLARYNGOLOGY

## 2024-04-04 PROCEDURE — P9037 PLATE PHERES LEUKOREDU IRRAD: HCPCS

## 2024-04-04 PROCEDURE — 87040 BLOOD CULTURE FOR BACTERIA: CPT | Performed by: NURSE PRACTITIONER

## 2024-04-04 PROCEDURE — 83735 ASSAY OF MAGNESIUM: CPT

## 2024-04-04 PROCEDURE — 90937 HEMODIALYSIS REPEATED EVAL: CPT

## 2024-04-04 PROCEDURE — 2500000001 HC RX 250 WO HCPCS SELF ADMINISTERED DRUGS (ALT 637 FOR MEDICARE OP): Performed by: OTOLARYNGOLOGY

## 2024-04-04 PROCEDURE — C9113 INJ PANTOPRAZOLE SODIUM, VIA: HCPCS | Performed by: STUDENT IN AN ORGANIZED HEALTH CARE EDUCATION/TRAINING PROGRAM

## 2024-04-04 PROCEDURE — 83735 ASSAY OF MAGNESIUM: CPT | Performed by: NURSE PRACTITIONER

## 2024-04-04 PROCEDURE — 70496 CT ANGIOGRAPHY HEAD: CPT | Performed by: RADIOLOGY

## 2024-04-04 PROCEDURE — 0BC78ZZ EXTIRPATION OF MATTER FROM LEFT MAIN BRONCHUS, VIA NATURAL OR ARTIFICIAL OPENING ENDOSCOPIC: ICD-10-PCS | Performed by: OTOLARYNGOLOGY

## 2024-04-04 PROCEDURE — 3600000003 HC OR TIME - INITIAL BASE CHARGE - PROCEDURE LEVEL THREE: Performed by: OTOLARYNGOLOGY

## 2024-04-04 PROCEDURE — 82248 BILIRUBIN DIRECT: CPT | Performed by: NURSE PRACTITIONER

## 2024-04-04 PROCEDURE — 36415 COLL VENOUS BLD VENIPUNCTURE: CPT | Performed by: NURSE PRACTITIONER

## 2024-04-04 PROCEDURE — P9073 PLATELETS PHERESIS PATH REDU: HCPCS

## 2024-04-04 PROCEDURE — A7521 TRACH/LARYN TUBE CUFFED: HCPCS | Performed by: OTOLARYNGOLOGY

## 2024-04-04 PROCEDURE — 31526 DX LARYNGOSCOPY W/OPER SCOPE: CPT | Performed by: OTOLARYNGOLOGY

## 2024-04-04 PROCEDURE — 3600000008 HC OR TIME - EACH INCREMENTAL 1 MINUTE - PROCEDURE LEVEL THREE: Performed by: OTOLARYNGOLOGY

## 2024-04-04 PROCEDURE — 71045 X-RAY EXAM CHEST 1 VIEW: CPT

## 2024-04-04 PROCEDURE — 2500000002 HC RX 250 W HCPCS SELF ADMINISTERED DRUGS (ALT 637 FOR MEDICARE OP, ALT 636 FOR OP/ED)

## 2024-04-04 PROCEDURE — 2020000001 HC ICU ROOM DAILY

## 2024-04-04 PROCEDURE — 82330 ASSAY OF CALCIUM: CPT | Performed by: STUDENT IN AN ORGANIZED HEALTH CARE EDUCATION/TRAINING PROGRAM

## 2024-04-04 PROCEDURE — 70498 CT ANGIOGRAPHY NECK: CPT | Performed by: RADIOLOGY

## 2024-04-04 PROCEDURE — 0CCS8ZZ EXTIRPATION OF MATTER FROM LARYNX, VIA NATURAL OR ARTIFICIAL OPENING ENDOSCOPIC: ICD-10-PCS | Performed by: OTOLARYNGOLOGY

## 2024-04-04 PROCEDURE — 99221 1ST HOSP IP/OBS SF/LOW 40: CPT | Performed by: OTOLARYNGOLOGY

## 2024-04-04 PROCEDURE — 3700000001 HC GENERAL ANESTHESIA TIME - INITIAL BASE CHARGE: Performed by: OTOLARYNGOLOGY

## 2024-04-04 PROCEDURE — A4217 STERILE WATER/SALINE, 500 ML: HCPCS | Performed by: OTOLARYNGOLOGY

## 2024-04-04 PROCEDURE — 2500000001 HC RX 250 WO HCPCS SELF ADMINISTERED DRUGS (ALT 637 FOR MEDICARE OP): Performed by: NURSE PRACTITIONER

## 2024-04-04 PROCEDURE — A35800 PR EXPLOR POSTOP BLEED,INFEC,CLOT-NECK: Performed by: NURSE ANESTHETIST, CERTIFIED REGISTERED

## 2024-04-04 PROCEDURE — 71275 CT ANGIOGRAPHY CHEST: CPT | Mod: RCN

## 2024-04-04 PROCEDURE — 2500000005 HC RX 250 GENERAL PHARMACY W/O HCPCS: Performed by: NURSE ANESTHETIST, CERTIFIED REGISTERED

## 2024-04-04 RX ORDER — ALBUMIN HUMAN 50 G/1000ML
SOLUTION INTRAVENOUS AS NEEDED
Status: DISCONTINUED | OUTPATIENT
Start: 2024-04-04 | End: 2024-04-04

## 2024-04-04 RX ORDER — PHENYLEPHRINE HYDROCHLORIDE 10 MG/ML
INJECTION INTRAVENOUS AS NEEDED
Status: DISCONTINUED | OUTPATIENT
Start: 2024-04-04 | End: 2024-04-04

## 2024-04-04 RX ORDER — ONDANSETRON HYDROCHLORIDE 2 MG/ML
INJECTION, SOLUTION INTRAVENOUS AS NEEDED
Status: DISCONTINUED | OUTPATIENT
Start: 2024-04-04 | End: 2024-04-04

## 2024-04-04 RX ORDER — SODIUM CHLORIDE 9 MG/ML
INJECTION, SOLUTION INTRAVENOUS CONTINUOUS PRN
Status: DISCONTINUED | OUTPATIENT
Start: 2024-04-04 | End: 2024-04-04

## 2024-04-04 RX ORDER — ROCURONIUM BROMIDE 10 MG/ML
INJECTION, SOLUTION INTRAVENOUS AS NEEDED
Status: DISCONTINUED | OUTPATIENT
Start: 2024-04-04 | End: 2024-04-04

## 2024-04-04 RX ORDER — NOREPINEPHRINE BITARTRATE 0.03 MG/ML
INJECTION, SOLUTION INTRAVENOUS CONTINUOUS PRN
Status: DISCONTINUED | OUTPATIENT
Start: 2024-04-04 | End: 2024-04-04

## 2024-04-04 RX ORDER — WATER 1 ML/ML
IRRIGANT IRRIGATION AS NEEDED
Status: DISCONTINUED | OUTPATIENT
Start: 2024-04-04 | End: 2024-04-04 | Stop reason: HOSPADM

## 2024-04-04 RX ORDER — PANTOPRAZOLE SODIUM 40 MG/10ML
40 INJECTION, POWDER, LYOPHILIZED, FOR SOLUTION INTRAVENOUS 2 TIMES DAILY
Status: DISCONTINUED | OUTPATIENT
Start: 2024-04-04 | End: 2024-04-08

## 2024-04-04 RX ORDER — INSULIN LISPRO 100 [IU]/ML
0-20 INJECTION, SOLUTION INTRAVENOUS; SUBCUTANEOUS EVERY 4 HOURS
Status: DISCONTINUED | OUTPATIENT
Start: 2024-04-04 | End: 2024-04-20

## 2024-04-04 RX ORDER — MEROPENEM 1 G/1
1 INJECTION, POWDER, FOR SOLUTION INTRAVENOUS EVERY 12 HOURS
Status: DISCONTINUED | OUTPATIENT
Start: 2024-04-04 | End: 2024-04-08

## 2024-04-04 RX ORDER — CALCIUM CHLORIDE INJECTION 100 MG/ML
INJECTION, SOLUTION INTRAVENOUS AS NEEDED
Status: DISCONTINUED | OUTPATIENT
Start: 2024-04-04 | End: 2024-04-04

## 2024-04-04 RX ORDER — FENTANYL CITRATE 50 UG/ML
INJECTION, SOLUTION INTRAMUSCULAR; INTRAVENOUS AS NEEDED
Status: DISCONTINUED | OUTPATIENT
Start: 2024-04-04 | End: 2024-04-04

## 2024-04-04 RX ORDER — PROPOFOL 10 MG/ML
INJECTION, EMULSION INTRAVENOUS CONTINUOUS PRN
Status: DISCONTINUED | OUTPATIENT
Start: 2024-04-04 | End: 2024-04-04

## 2024-04-04 RX ADMIN — PANTOPRAZOLE SODIUM 40 MG: 40 INJECTION, POWDER, FOR SOLUTION INTRAVENOUS at 20:30

## 2024-04-04 RX ADMIN — INSULIN LISPRO 8 UNITS: 100 INJECTION, SOLUTION INTRAVENOUS; SUBCUTANEOUS at 14:53

## 2024-04-04 RX ADMIN — FENTANYL CITRATE 25 MCG: 50 INJECTION, SOLUTION INTRAMUSCULAR; INTRAVENOUS at 12:12

## 2024-04-04 RX ADMIN — HYDROCORTISONE SODIUM SUCCINATE 50 MG: 100 INJECTION, POWDER, FOR SOLUTION INTRAMUSCULAR; INTRAVENOUS at 15:36

## 2024-04-04 RX ADMIN — HYDROCORTISONE SODIUM SUCCINATE 50 MG: 100 INJECTION, POWDER, FOR SOLUTION INTRAMUSCULAR; INTRAVENOUS at 09:38

## 2024-04-04 RX ADMIN — PHENYLEPHRINE HYDROCHLORIDE 80 MCG: 10 INJECTION INTRAVENOUS at 12:39

## 2024-04-04 RX ADMIN — VANCOMYCIN HYDROCHLORIDE 1750 MG: 5 INJECTION, POWDER, LYOPHILIZED, FOR SOLUTION INTRAVENOUS at 19:30

## 2024-04-04 RX ADMIN — PROPOFOL 15 MCG/KG/MIN: 10 INJECTION, EMULSION INTRAVENOUS at 20:56

## 2024-04-04 RX ADMIN — ROCURONIUM BROMIDE 50 MG: 10 INJECTION INTRAVENOUS at 11:33

## 2024-04-04 RX ADMIN — HYDROCORTISONE SODIUM SUCCINATE 50 MG: 100 INJECTION, POWDER, FOR SOLUTION INTRAMUSCULAR; INTRAVENOUS at 21:30

## 2024-04-04 RX ADMIN — INSULIN GLARGINE 10 UNITS: 100 INJECTION, SOLUTION SUBCUTANEOUS at 08:52

## 2024-04-04 RX ADMIN — MIDODRINE HYDROCHLORIDE 20 MG: 10 TABLET ORAL at 19:30

## 2024-04-04 RX ADMIN — ROCURONIUM BROMIDE 20 MG: 10 INJECTION INTRAVENOUS at 13:01

## 2024-04-04 RX ADMIN — MEROPENEM 1 G: 1 INJECTION, POWDER, FOR SOLUTION INTRAVENOUS at 21:30

## 2024-04-04 RX ADMIN — PHENYLEPHRINE HYDROCHLORIDE 80 MCG: 10 INJECTION INTRAVENOUS at 12:41

## 2024-04-04 RX ADMIN — IOHEXOL 80 ML: 350 INJECTION, SOLUTION INTRAVENOUS at 02:16

## 2024-04-04 RX ADMIN — SUGAMMADEX 400 MG: 100 INJECTION, SOLUTION INTRAVENOUS at 13:41

## 2024-04-04 RX ADMIN — TRANEXAMIC ACID 500 MG: 100 INJECTION, SOLUTION INTRAVENOUS at 07:52

## 2024-04-04 RX ADMIN — CALCIUM CHLORIDE 1 G: 100 INJECTION, SOLUTION INTRAVENOUS at 12:08

## 2024-04-04 RX ADMIN — INSULIN LISPRO 6 UNITS: 100 INJECTION, SOLUTION INTRAVENOUS; SUBCUTANEOUS at 08:52

## 2024-04-04 RX ADMIN — INSULIN LISPRO 4 UNITS: 100 INJECTION, SOLUTION INTRAVENOUS; SUBCUTANEOUS at 00:36

## 2024-04-04 RX ADMIN — HYDROCORTISONE SODIUM SUCCINATE 50 MG: 100 INJECTION, POWDER, FOR SOLUTION INTRAMUSCULAR; INTRAVENOUS at 04:12

## 2024-04-04 RX ADMIN — Medication 0.1 MCG/KG/MIN: at 11:27

## 2024-04-04 RX ADMIN — MEROPENEM 2 G: 1 INJECTION, POWDER, FOR SOLUTION INTRAVENOUS at 00:13

## 2024-04-04 RX ADMIN — INSULIN LISPRO 4 UNITS: 100 INJECTION, SOLUTION INTRAVENOUS; SUBCUTANEOUS at 20:29

## 2024-04-04 RX ADMIN — CALCIUM GLUCONATE 2 G: 20 INJECTION, SOLUTION INTRAVENOUS at 11:18

## 2024-04-04 RX ADMIN — Medication 70 PERCENT: at 07:52

## 2024-04-04 RX ADMIN — ALBUMIN HUMAN 250 ML: 0.05 INJECTION, SOLUTION INTRAVENOUS at 12:36

## 2024-04-04 RX ADMIN — SODIUM CHLORIDE: 0.9 INJECTION, SOLUTION INTRAVENOUS at 11:24

## 2024-04-04 RX ADMIN — MEROPENEM 2 G: 1 INJECTION, POWDER, FOR SOLUTION INTRAVENOUS at 08:53

## 2024-04-04 RX ADMIN — MICAFUNGIN SODIUM 100 MG: 100 INJECTION, POWDER, LYOPHILIZED, FOR SOLUTION INTRAVENOUS at 05:00

## 2024-04-04 RX ADMIN — CALCIUM GLUCONATE 1 G: 20 INJECTION, SOLUTION INTRAVENOUS at 04:12

## 2024-04-04 RX ADMIN — PROPOFOL 25 MCG/KG/MIN: 10 INJECTION, EMULSION INTRAVENOUS at 05:54

## 2024-04-04 RX ADMIN — INSULIN GLARGINE 10 UNITS: 100 INJECTION, SOLUTION SUBCUTANEOUS at 20:29

## 2024-04-04 RX ADMIN — ONDANSETRON 4 MG: 2 INJECTION INTRAMUSCULAR; INTRAVENOUS at 13:19

## 2024-04-04 RX ADMIN — INSULIN LISPRO 2 UNITS: 100 INJECTION, SOLUTION INTRAVENOUS; SUBCUTANEOUS at 04:11

## 2024-04-04 RX ADMIN — PROPOFOL 25 MCG/KG/MIN: 10 INJECTION, EMULSION INTRAVENOUS at 11:22

## 2024-04-04 RX ADMIN — FENTANYL CITRATE 25 MCG: 50 INJECTION, SOLUTION INTRAMUSCULAR; INTRAVENOUS at 11:48

## 2024-04-04 RX ADMIN — PANTOPRAZOLE SODIUM 40 MG: 40 INJECTION, POWDER, FOR SOLUTION INTRAVENOUS at 08:52

## 2024-04-04 ASSESSMENT — PAIN - FUNCTIONAL ASSESSMENT

## 2024-04-04 ASSESSMENT — PAIN SCALES - GENERAL: PAIN_LEVEL: 0

## 2024-04-04 NOTE — PROGRESS NOTES
"ENT DAILY PROGRESS NOTE  Name: Jason Hollins  MRN: [id number]  : 1960    Identification Statement  Jason is s/p POD3 from open tracheostomy by Dr. Perkins on 24.     Subjective  ENT was called overnight secondary to continue pooling of blood/clotting accumulating in the oropharynx.  Discussion was held between ENT and GI about a possible EGD with GI.  However, it was determined that the bleeding source was most likely ENT in nature.  Significant sven of      Objective  Temp:  [36.5 °C (97.7 °F)-38.6 °C (101.5 °F)] 36.6 °C (97.9 °F)  Heart Rate:  [] 80  Resp:  [16-31] 24  BP: (115-155)/(48-59) 150/53  Arterial Line BP 1: ()/(40-64) 132/48  FiO2 (%):  [50 %-100 %] 50 %  Last Recorded Vitals  Blood pressure 150/53, pulse 80, temperature 36.6 °C (97.9 °F), temperature source Temporal, resp. rate 24, height 1.778 m (5' 10\"), weight 125 kg (275 lb 5.7 oz), SpO2 97 %.     PHYSICAL EXAMINATION:   Constitutional: Jaundiced  Voice:  Unable to evaluate secondary to intubation  Respiration:  Intubated, stable rate on ventilator, chest rise symmetric  Cardiovascular:  No clubbing/cyanosis/edema in hands  Eyes: Icterus of the sclera  Neuro:  Intubated and sedated  Head and Face:  Symmetric facial features, no masses or lesions  Salivary Glands:  Parotid and submandibular glands normal bilaterally  Ears:  Normal external ears, EAC without gross lesions or drainage  Nose: External nose midline, clots removed from the nasal passageway bilaterally   oral Cavity/Oropharynx/Lips:  ETT in place, clots removed from the oropharynx, larynx,   Pharynx: Clots removed from pharynx   neck/Lymph:  No LAD, no thyroid masses, trachea midline, palpable sternal notch, thyroid cartilage, and cricoid cartilage  Skin:  Neck skin is without scar or injury          Intake/Output Summary (Last 24 hours) at 2024 1703  Last data filed at 2024 1646  Gross per 24 hour   Intake 2357.51 ml   Output 510 ml   Net 1847.51 ml "       Labs    Assessment  Jason is s/p POD3 from open tracheostomy by Dr. Perkins on 4/1/24.  He continued to have significant amounts of bleeding/clot formation in his nasopharynx, oropharynx and larynx overnight.  ENT assessed the patient at 0700.  At that time.  Clots were removed from the patient's oropharynx and larynx.  His next was wrapped with Kerlix and Afrin-soaked gauze was placed the patient oropharynx.  At 1000, the patient was reevaluated.  There is no evidence that the bleeding had decreased.  Decision was made by ENT to take the patient back to the OR for hemostasis, direct laryngoscopy, bronchoscopy and esophagoscopy-please see op note.  Hemostasis was achieved.    Plan:  -Patient to return to the OR with Dr. Wolfe for hemostasis, direct laryngoscopy,i bronchoscopy and esophagoscopy.    UPDATE: 4/4/23 1630  Hemostasis was achieved. The patient's 6-0 proximal XLT trach was exchanged for a 6-0 DXL trach. The patient was reassessed in his room 4 hours after returning from the operating room.  There is no evidence of any bleeding surrounding the patient's tracheal stoma or in his oropharynx/nasopharynx.    ENT recommendations are below  - Continue standard trach care  - ENT to perform postop day 1 trach check 4-5-2024  - Sutures to be removed 4-9-2024  - Anticoagulation per patient's primary team    Dennis Katz, DO PGY-2  Otolaryngology - Head & Neck Surgery  [facility]     ENT Consult pager: [phone]  Please Page if Urgent       -Please page ENT with any questions or concerns

## 2024-04-04 NOTE — SIGNIFICANT EVENT
ENT Clinical Event Note    ENT called to bedside for bleeding from trach stoma with bloody tracheal secretions.  On arrival  minimal oozing around trach stoma. Large clot suctioned  from  oropharynx. Tracheoscopy and bronchoscopy performed  showing no active bleeding in airway, clot seen  obstructing R  mainstem bronchus. BAL attempted with large bronchoscope able to dislodge clot but not suction it from the trach due to poor respiratory reserve off the vent with significant desaturation. Afrin soaked surgicel was  packed within trach stoma and lidocaine with epinephrine was injected into the stoma to facilitate hemostasis. Stomal bleeding was noted to be improved and not soaking through surgicel.  Decision was made to proceed to CT scan for chest and neck imaging once clinically stable per SICU.     .Maribell Garcia MD  PGY-2  ENT  P: [phone]

## 2024-04-04 NOTE — ANESTHESIA POSTPROCEDURE EVALUATION
Patient: Jason Hollins    Procedure Summary       Date: 04/04/24 Room / Location: [facility] OR 04 / [facility] OR    Anesthesia Start: 1127 Anesthesia Stop: 1347    Procedures:       Direct Laryngoscopy      Bronchoscopy      Esophagoscopy      control of hemorrhage Diagnosis:       Tracheostomy hemorrhage (CMS/HCC)      (Tracheostomy hemorrhage (CMS/HCC) [J95.01])    Surgeons: Phoenix Wolfe MD Responsible Provider: Zohra Bass MD    Anesthesia Type: general ASA Status: 3            Anesthesia Type: general    Vitals Value Taken Time   /48 04/04/24 1348   Temp 37.4 04/04/24 1348   Pulse 76 04/04/24 1347   Resp 22 04/04/24 1347   SpO2 96 % 04/04/24 1347   Vitals shown include unvalidated device data.    Anesthesia Post Evaluation    Patient location during evaluation: ICU  Patient participation: complete - patient cannot participate  Level of consciousness: sedated  Pain score: 0  Pain management: adequate  Multimodal analgesia pain management approach  Airway patency: patent  Two or more strategies used to mitigate risk of obstructive sleep apnea  Cardiovascular status: acceptable  Respiratory status: acceptable  Hydration status: acceptable  Postoperative Nausea and Vomiting: none        No notable events documented.

## 2024-04-04 NOTE — PROGRESS NOTES
Jason Hollins is a 63 y.o. male on day 30 of admission presenting with Soft tissue sarcoma (CMS/HCC).    Subjective   Still with bloody oral secretions and julio c-tracheal area.    Taken back to OR today with ENT s/p esophagoscopy and bronchoscopy, trach revision. Found extensive clot burden in esophagus and stomach as well as in the lungs.        Objective     Physical Exam  Vitals reviewed.   Constitutional:       Appearance: He is ill-appearing.      Comments: Trached.   HENT:      Head:      Comments: Edematous face/head/neck     Nose:      Comments: Dobhoff in R nare bridled in place at 65cm.     Mouth/Throat:      Mouth: Mucous membranes are dry.   Eyes:      General: Scleral icterus present.      Pupils: Pupils are equal, round, and reactive to light.      Comments: Conjunctival edema. Subconjunctival hemorrhage. Pupils pinpoint   Neck:      Comments: Trach midline. 6-0 proximal Shiley XLT, dressing with bloody drainage. RIJ trialysis line in place, dressing c/d/i.   Cardiovascular:      Rate and Rhythm: Normal rate and regular rhythm.      Pulses:           Radial pulses are 1+ on the right side and 1+ on the left side.        Dorsalis pedis pulses are 1+ on the right side and 1+ on the left side.      Heart sounds: Normal heart sounds.   Pulmonary:      Comments: Mechanically ventilated via trach.  Abdominal:      General: Abdomen is protuberant. Bowel sounds are normal.      Palpations: Abdomen is soft.      Comments: Obese abdomen.   Genitourinary:     Comments: Penile and scrotal edema. Dignicare in place with liquid brown stool.  Musculoskeletal:      Cervical back: Neck supple.   Skin:     General: Skin is warm.      Coloration: Skin is jaundiced.      Comments: Anasarca. Right flap site with wound VAC, no output. ANDERSON x 3 all with serosanguinous output.   Neurological:      GCS: GCS eye subscore is 1. GCS verbal subscore is 1. GCS motor subscore is 1.      Comments: Trached. Sedated.     Psychiatric:       "Comments: HANNAH.     Last Recorded Vitals  Blood pressure 150/53, pulse 80, temperature 36.6 °C (97.9 °F), temperature source Temporal, resp. rate 24, height 1.778 m (5' 10\"), weight 125 kg (275 lb 5.7 oz), SpO2 97 %.    VS over last 24h  Heart Rate:  []   Temp:  [36.5 °C (97.7 °F)-38.7 °C (101.7 °F)]   Resp:  [16-31]   BP: (115-155)/(48-59)   SpO2:  [87 %-99 %]        Intake/Output Summary (Last 24 hours) at 4/4/2024 1506  Last data filed at 4/4/2024 1346  Gross per 24 hour   Intake 2699.51 ml   Output 410 ml   Net 2289.51 ml          Scheduled medications  fludrocortisone, 0.1 mg, nasogastric tube, q12h  hydrocortisone sodium succinate, 50 mg, intravenous, q6h  insulin glargine, 10 Units, subcutaneous, q12h  insulin lispro, 0-20 Units, subcutaneous, q4h  meropenem, 2 g, intravenous, q8h  micafungin, 100 mg, intravenous, q24h  midodrine, 20 mg, orogastric tube, q8h  pantoprazole, 40 mg, intravenous, BID  [Held by provider] piperacillin-tazobactam, 3.375 g, intravenous, q6h      Continuous medications  lactated Ringer's, 5 mL/hr, Last Rate: 5 mL/hr (04/04/24 0800)  norepinephrine, 0.01-0.2 mcg/kg/min (Dosing Weight), Last Rate: 0.06 mcg/kg/min (04/04/24 1422)  propofol, 5-50 mcg/kg/min, Last Rate: 15 mcg/kg/min (04/04/24 1422)      PRN medications  PRN medications: alteplase, calcium gluconate, calcium gluconate, dextrose **OR** glucagon, magnesium sulfate, magnesium sulfate, oxygen, vancomycin      Results for orders placed or performed during the hospital encounter of 03/05/24 (from the past 24 hour(s))   POCT GLUCOSE   Result Value Ref Range    POCT Glucose 199 (H) 74 - 99 mg/dL   Respiratory Culture/Smear    Specimen: BAL; Fluid   Result Value Ref Range    Respiratory Culture/Smear Culture in progress     Gram Stain No polymorphonuclear leukocytes seen (A)     Gram Stain (A)      (2+) Few Mixed Gram positive and Gram negative bacteria   CALCIUM, IONIZED   Result Value Ref Range    POCT Calcium, Ionized 1.03 " (L) 1.1 - 1.33 mmol/L   CBC   Result Value Ref Range    WBC 3.1 (L) 4.4 - 11.3 x10*3/uL    nRBC 2.6 (H) 0.0 - 0.0 /100 WBCs    RBC 2.52 (L) 4.50 - 5.90 x10*6/uL    Hemoglobin 7.3 (L) 13.5 - 17.5 g/dL    Hematocrit 22.1 (L) 41.0 - 52.0 %    MCV 88 80 - 100 fL    MCH 29.0 26.0 - 34.0 pg    MCHC 33.0 32.0 - 36.0 g/dL    RDW 21.8 (H) 11.5 - 14.5 %    Platelets 59 (L) 150 - 450 x10*3/uL   Coagulation Screen   Result Value Ref Range    Protime 28.1 (H) 9.8 - 12.8 seconds    INR 2.5 (H) 0.9 - 1.1    aPTT 40 (H) 27 - 38 seconds   Renal Function Panel   Result Value Ref Range    Glucose 201 (H) 74 - 99 mg/dL    Sodium 134 (L) 136 - 145 mmol/L    Potassium 4.6 3.5 - 5.3 mmol/L    Chloride 96 (L) 98 - 107 mmol/L    Bicarbonate 25 21 - 32 mmol/L    Anion Gap 18 10 - 20 mmol/L    Urea Nitrogen 39 (H) 6 - 23 mg/dL    Creatinine 1.76 (H) 0.50 - 1.30 mg/dL    eGFR 43 (L) >60 mL/min/1.73m*2    Calcium 8.0 (L) 8.6 - 10.6 mg/dL    Phosphorus 3.7 2.5 - 4.9 mg/dL    Albumin 3.3 (L) 3.4 - 5.0 g/dL   Magnesium   Result Value Ref Range    Magnesium 2.08 1.60 - 2.40 mg/dL   Lactate   Result Value Ref Range    Lactate 2.0 0.4 - 2.0 mmol/L   Blood Culture    Specimen: Peripheral Venipuncture; Blood culture   Result Value Ref Range    Blood Culture       Identification and susceptibility testing to follow    Gram Stain Gram negative bacilli (AA)    Blood Culture    Specimen: Peripheral Venipuncture; Blood culture   Result Value Ref Range    Blood Culture       Identification and susceptibility testing to follow    Gram Stain Gram negative bacilli (AA)    Blood Gas Arterial Full Panel   Result Value Ref Range    POCT pH, Arterial 7.15 (LL) 7.38 - 7.42 pH    POCT pCO2, Arterial 75 (HH) 38 - 42 mm Hg    POCT pO2, Arterial 253 (H) 85 - 95 mm Hg    POCT SO2, Arterial 99 94 - 100 %    POCT Oxy Hemoglobin, Arterial 96.7 94.0 - 98.0 %    POCT Hematocrit Calculated, Arterial 25.0 (L) 41.0 - 52.0 %    POCT Sodium, Arterial 130 (L) 136 - 145 mmol/L     POCT Potassium, Arterial 5.3 3.5 - 5.3 mmol/L    POCT Chloride, Arterial 97 (L) 98 - 107 mmol/L    POCT Ionized Calcium, Arterial 1.16 1.10 - 1.33 mmol/L    POCT Glucose, Arterial 238 (H) 74 - 99 mg/dL    POCT Lactate, Arterial 2.6 (H) 0.4 - 2.0 mmol/L    POCT Base Excess, Arterial -3.2 (L) -2.0 - 3.0 mmol/L    POCT HCO3 Calculated, Arterial 26.1 (H) 22.0 - 26.0 mmol/L    POCT Hemoglobin, Arterial 8.4 (L) 13.5 - 17.5 g/dL    POCT Anion Gap, Arterial 12 10 - 25 mmo/L    Patient Temperature 37.0 degrees Celsius    FiO2 100 %   CBC   Result Value Ref Range    WBC 4.6 4.4 - 11.3 x10*3/uL    nRBC 2.6 (H) 0.0 - 0.0 /100 WBCs    RBC 2.67 (L) 4.50 - 5.90 x10*6/uL    Hemoglobin 7.9 (L) 13.5 - 17.5 g/dL    Hematocrit 23.4 (L) 41.0 - 52.0 %    MCV 88 80 - 100 fL    MCH 29.6 26.0 - 34.0 pg    MCHC 33.8 32.0 - 36.0 g/dL    RDW 21.2 (H) 11.5 - 14.5 %    Platelets 58 (L) 150 - 450 x10*3/uL   POCT GLUCOSE   Result Value Ref Range    POCT Glucose 229 (H) 74 - 99 mg/dL   Prepare RBC: 1 Units   Result Value Ref Range    PRODUCT CODE P3068O62     Unit Number [id number]     Unit ABO O     Unit RH POS     XM INTEP COMP     Dispense Status IS     Blood Expiration Date April 11, 2024 23:59 EDT     PRODUCT BLOOD TYPE 5100     UNIT VOLUME 350    Blood Gas Arterial Full Panel   Result Value Ref Range    POCT pH, Arterial 7.27 (L) 7.38 - 7.42 pH    POCT pCO2, Arterial 53 (H) 38 - 42 mm Hg    POCT pO2, Arterial 77 (L) 85 - 95 mm Hg    POCT SO2, Arterial 97 94 - 100 %    POCT Oxy Hemoglobin, Arterial 94.2 94.0 - 98.0 %    POCT Hematocrit Calculated, Arterial 24.0 (L) 41.0 - 52.0 %    POCT Sodium, Arterial 129 (L) 136 - 145 mmol/L    POCT Potassium, Arterial 5.3 3.5 - 5.3 mmol/L    POCT Chloride, Arterial 96 (L) 98 - 107 mmol/L    POCT Ionized Calcium, Arterial 1.12 1.10 - 1.33 mmol/L    POCT Glucose, Arterial 242 (H) 74 - 99 mg/dL    POCT Lactate, Arterial 2.9 (H) 0.4 - 2.0 mmol/L    POCT Base Excess, Arterial -2.7 (L) -2.0 - 3.0 mmol/L     POCT HCO3 Calculated, Arterial 24.3 22.0 - 26.0 mmol/L    POCT Hemoglobin, Arterial 8.0 (L) 13.5 - 17.5 g/dL    POCT Anion Gap, Arterial 14 10 - 25 mmo/L    Patient Temperature 37.0 degrees Celsius    FiO2 100 %   Blood Gas Arterial Full Panel   Result Value Ref Range    POCT pH, Arterial 7.31 (L) 7.38 - 7.42 pH    POCT pCO2, Arterial 45 (H) 38 - 42 mm Hg    POCT pO2, Arterial 137 (H) 85 - 95 mm Hg    POCT SO2, Arterial 100 94 - 100 %    POCT Oxy Hemoglobin, Arterial 96.9 94.0 - 98.0 %    POCT Hematocrit Calculated, Arterial 27.0 (L) 41.0 - 52.0 %    POCT Sodium, Arterial 128 (L) 136 - 145 mmol/L    POCT Potassium, Arterial 5.2 3.5 - 5.3 mmol/L    POCT Chloride, Arterial 95 (L) 98 - 107 mmol/L    POCT Ionized Calcium, Arterial 1.06 (L) 1.10 - 1.33 mmol/L    POCT Glucose, Arterial 229 (H) 74 - 99 mg/dL    POCT Lactate, Arterial 2.9 (H) 0.4 - 2.0 mmol/L    POCT Base Excess, Arterial -3.4 (L) -2.0 - 3.0 mmol/L    POCT HCO3 Calculated, Arterial 22.7 22.0 - 26.0 mmol/L    POCT Hemoglobin, Arterial 8.9 (L) 13.5 - 17.5 g/dL    POCT Anion Gap, Arterial 16 10 - 25 mmo/L    Patient Temperature 37.0 degrees Celsius    FiO2 100 %   CALCIUM, IONIZED   Result Value Ref Range    POCT Calcium, Ionized 1.04 (L) 1.1 - 1.33 mmol/L   CBC   Result Value Ref Range    WBC 4.8 4.4 - 11.3 x10*3/uL    nRBC 1.9 (H) 0.0 - 0.0 /100 WBCs    RBC 2.81 (L) 4.50 - 5.90 x10*6/uL    Hemoglobin 8.2 (L) 13.5 - 17.5 g/dL    Hematocrit 24.7 (L) 41.0 - 52.0 %    MCV 88 80 - 100 fL    MCH 29.2 26.0 - 34.0 pg    MCHC 33.2 32.0 - 36.0 g/dL    RDW 20.4 (H) 11.5 - 14.5 %    Platelets 57 (L) 150 - 450 x10*3/uL   Coagulation Screen   Result Value Ref Range    Protime 17.5 (H) 9.8 - 12.8 seconds    INR 1.5 (H) 0.9 - 1.1    aPTT 40 (H) 27 - 38 seconds   Hepatic function panel   Result Value Ref Range    Albumin 3.4 3.4 - 5.0 g/dL    Bilirubin, Total 28.1 (H) 0.0 - 1.2 mg/dL    Bilirubin, Direct 19.8 (H) 0.0 - 0.3 mg/dL    Alkaline Phosphatase 193 (H) 33 - 136 U/L      (H) 10 - 52 U/L     (H) 9 - 39 U/L    Total Protein 5.2 (L) 6.4 - 8.2 g/dL   Magnesium   Result Value Ref Range    Magnesium 2.13 1.60 - 2.40 mg/dL   Basic Metabolic Panel   Result Value Ref Range    Glucose 243 (H) 74 - 99 mg/dL    Sodium 132 (L) 136 - 145 mmol/L    Potassium 5.2 3.5 - 5.3 mmol/L    Chloride 93 (L) 98 - 107 mmol/L    Bicarbonate 23 21 - 32 mmol/L    Anion Gap 21 (H) 10 - 20 mmol/L    Urea Nitrogen 46 (H) 6 - 23 mg/dL    Creatinine 2.08 (H) 0.50 - 1.30 mg/dL    eGFR 35 (L) >60 mL/min/1.73m*2    Calcium 8.2 (L) 8.6 - 10.6 mg/dL   Phosphorus   Result Value Ref Range    Phosphorus 4.9 2.5 - 4.9 mg/dL   POCT GLUCOSE   Result Value Ref Range    POCT Glucose 245 (H) 74 - 99 mg/dL   Blood Gas Arterial Full Panel   Result Value Ref Range    POCT pH, Arterial 7.23 (LL) 7.38 - 7.42 pH    POCT pCO2, Arterial 58 (H) 38 - 42 mm Hg    POCT pO2, Arterial 55 (L) 85 - 95 mm Hg    POCT SO2, Arterial 88 (L) 94 - 100 %    POCT Oxy Hemoglobin, Arterial 85.2 (L) 94.0 - 98.0 %    POCT Hematocrit Calculated, Arterial 25.0 (L) 41.0 - 52.0 %    POCT Sodium, Arterial 128 (L) 136 - 145 mmol/L    POCT Potassium, Arterial 5.4 (H) 3.5 - 5.3 mmol/L    POCT Chloride, Arterial 96 (L) 98 - 107 mmol/L    POCT Ionized Calcium, Arterial 1.10 1.10 - 1.33 mmol/L    POCT Glucose, Arterial 241 (H) 74 - 99 mg/dL    POCT Lactate, Arterial 2.9 (H) 0.4 - 2.0 mmol/L    POCT Base Excess, Arterial -3.4 (L) -2.0 - 3.0 mmol/L    POCT HCO3 Calculated, Arterial 24.3 22.0 - 26.0 mmol/L    POCT Hemoglobin, Arterial 8.4 (L) 13.5 - 17.5 g/dL    POCT Anion Gap, Arterial 13 10 - 25 mmo/L    Patient Temperature 37.0 degrees Celsius    FiO2 100 %   Blood Gas Arterial Full Panel   Result Value Ref Range    POCT pH, Arterial 7.32 (L) 7.38 - 7.42 pH    POCT pCO2, Arterial 31 (L) 38 - 42 mm Hg    POCT pO2, Arterial 140 (H) 85 - 95 mm Hg    POCT SO2, Arterial 100 94 - 100 %    POCT Oxy Hemoglobin, Arterial 97.1 94.0 - 98.0 %    POCT Hematocrit  Calculated, Arterial 18.0 (L) 41.0 - 52.0 %    POCT Sodium, Arterial 135 (L) 136 - 145 mmol/L    POCT Potassium, Arterial 3.5 3.5 - 5.3 mmol/L    POCT Chloride, Arterial 110 (H) 98 - 107 mmol/L    POCT Ionized Calcium, Arterial 0.83 (L) 1.10 - 1.33 mmol/L    POCT Glucose, Arterial 167 (H) 74 - 99 mg/dL    POCT Lactate, Arterial 1.7 0.4 - 2.0 mmol/L    POCT Base Excess, Arterial -9.3 (L) -2.0 - 3.0 mmol/L    POCT HCO3 Calculated, Arterial 16.0 (L) 22.0 - 26.0 mmol/L    POCT Hemoglobin, Arterial 6.1 (LL) 13.5 - 17.5 g/dL    POCT Anion Gap, Arterial 13 10 - 25 mmo/L    Patient Temperature 37.0 degrees Celsius    FiO2 100 %   POCT GLUCOSE   Result Value Ref Range    POCT Glucose 179 (H) 74 - 99 mg/dL   Prepare RBC: 2 Units   Result Value Ref Range    PRODUCT CODE H9009Q75     Unit Number [id number]     Unit ABO O     Unit RH POS     XM INTEP COMP     Dispense Status IS     Blood Expiration Date April 29, 2024 23:59 EDT     PRODUCT BLOOD TYPE 5100     UNIT VOLUME 350     PRODUCT CODE C4746T30     Unit Number [id number]     Unit ABO O     Unit RH POS     XM INTEP COMP     Dispense Status IS     Blood Expiration Date May 01, 2024 23:59 EDT     PRODUCT BLOOD TYPE 5100     UNIT VOLUME 285    Prepare Plasma: 1 Units   Result Value Ref Range    PRODUCT CODE L7207X08     Unit Number [id number]     Unit ABO B     Unit RH POS     Dispense Status IS     Blood Expiration Date April 08, 2024 05:32 EDT     PRODUCT BLOOD TYPE 7300     UNIT VOLUME 294    Prepare Platelets: 1 Units   Result Value Ref Range    PRODUCT CODE [id number]     Unit Number [id number]     Unit ABO A     Unit RH POS     Dispense Status IS     Blood Expiration Date April 04, 2024 23:59 EDT     PRODUCT BLOOD TYPE 6200     UNIT VOLUME 330    CBC   Result Value Ref Range    WBC 4.6 4.4 - 11.3 x10*3/uL    nRBC 1.1 (H) 0.0 - 0.0 /100 WBCs    RBC 2.56 (L) 4.50 - 5.90 x10*6/uL    Hemoglobin 7.6 (L) 13.5 - 17.5 g/dL    Hematocrit 22.7 (L) 41.0 - 52.0 %     MCV 89 80 - 100 fL    MCH 29.7 26.0 - 34.0 pg    MCHC 33.5 32.0 - 36.0 g/dL    RDW 20.6 (H) 11.5 - 14.5 %    Platelets 51 (L) 150 - 450 x10*3/uL   TEG Clot Global Profile   Result Value Ref Range    R (Reaction Time) K 12.4 (H) 4.6 - 9.1 min    K (Clot Kinetics) 1.8 0.8 - 2.1 min    ANGLE 70.0 63.0 - 78.0 deg    MA (Max Amplitude) K 56.0 52.0 - 69.0 mm    R (Reaction Time) KH 9.7 (H) 4.3 - 8.3 min    MA (Max Amplitude) RT 55.0 52.0 - 70.0 mm    MA ( Ramiro Amplitude) FF 21.0 15.0 - 32.0 mm    FLEV 383 278 - 581 mg/dL   CBC   Result Value Ref Range    WBC 2.9 (L) 4.4 - 11.3 x10*3/uL    nRBC 1.7 (H) 0.0 - 0.0 /100 WBCs    RBC 2.65 (L) 4.50 - 5.90 x10*6/uL    Hemoglobin 7.9 (L) 13.5 - 17.5 g/dL    Hematocrit 23.3 (L) 41.0 - 52.0 %    MCV 88 80 - 100 fL    MCH 29.8 26.0 - 34.0 pg    MCHC 33.9 32.0 - 36.0 g/dL    RDW 19.9 (H) 11.5 - 14.5 %    Platelets 48 (L) 150 - 450 x10*3/uL   Blood Gas Arterial Full Panel   Result Value Ref Range    POCT pH, Arterial 7.32 (L) 7.38 - 7.42 pH    POCT pCO2, Arterial 45 (H) 38 - 42 mm Hg    POCT pO2, Arterial 88 85 - 95 mm Hg    POCT SO2, Arterial 99 94 - 100 %    POCT Oxy Hemoglobin, Arterial 96.2 94.0 - 98.0 %    POCT Hematocrit Calculated, Arterial 25.0 (L) 41.0 - 52.0 %    POCT Sodium, Arterial 130 (L) 136 - 145 mmol/L    POCT Potassium, Arterial 5.2 3.5 - 5.3 mmol/L    POCT Chloride, Arterial 96 (L) 98 - 107 mmol/L    POCT Ionized Calcium, Arterial 1.06 (L) 1.10 - 1.33 mmol/L    POCT Glucose, Arterial 267 (H) 74 - 99 mg/dL    POCT Lactate, Arterial 2.8 (H) 0.4 - 2.0 mmol/L    POCT Base Excess, Arterial -2.8 (L) -2.0 - 3.0 mmol/L    POCT HCO3 Calculated, Arterial 23.2 22.0 - 26.0 mmol/L    POCT Hemoglobin, Arterial 8.3 (L) 13.5 - 17.5 g/dL    POCT Anion Gap, Arterial 16 10 - 25 mmo/L    Patient Temperature 37.0 degrees Celsius    FiO2 80 %   Prepare RBC: 4 Units   Result Value Ref Range    PRODUCT CODE L6366Y97     Unit Number [id number]     Unit ABO O     Unit RH POS     XM  INTEP COMP     Dispense Status XM     Blood Expiration Date April 29, 2024 23:59 EDT     PRODUCT BLOOD TYPE 5100     UNIT VOLUME 350     PRODUCT CODE A6649E57     Unit Number [id number]     Unit ABO O     Unit RH POS     XM INTEP COMP     Dispense Status XM     Blood Expiration Date May 02, 2024 23:59 EDT     PRODUCT BLOOD TYPE 5100     UNIT VOLUME 287     PRODUCT CODE W8042I06     Unit Number [id number]     Unit ABO O     Unit RH POS     XM INTEP COMP     Dispense Status XM     Blood Expiration Date May 02, 2024 23:59 EDT     PRODUCT BLOOD TYPE 5100     UNIT VOLUME 350     PRODUCT CODE T1710S92     Unit Number [id number]     Unit ABO O     Unit RH POS     XM INTEP COMP     Dispense Status XM     Blood Expiration Date May 01, 2024 23:59 EDT     PRODUCT BLOOD TYPE 5100     UNIT VOLUME 350    POCT GLUCOSE   Result Value Ref Range    POCT Glucose 262 (H) 74 - 99 mg/dL   Blood Gas Arterial Full Panel   Result Value Ref Range    POCT pH, Arterial 7.35 (L) 7.38 - 7.42 pH    POCT pCO2, Arterial 42 38 - 42 mm Hg    POCT pO2, Arterial 114 (H) 85 - 95 mm Hg    POCT SO2, Arterial 100 94 - 100 %    POCT Oxy Hemoglobin, Arterial 96.3 94.0 - 98.0 %    POCT Hematocrit Calculated, Arterial 25.0 (L) 41.0 - 52.0 %    POCT Sodium, Arterial 126 (L) 136 - 145 mmol/L    POCT Potassium, Arterial 5.1 3.5 - 5.3 mmol/L    POCT Chloride, Arterial 98 98 - 107 mmol/L    POCT Ionized Calcium, Arterial 1.05 (L) 1.10 - 1.33 mmol/L    POCT Glucose, Arterial 267 (H) 74 - 99 mg/dL    POCT Lactate, Arterial 2.4 (H) 0.4 - 2.0 mmol/L    POCT Base Excess, Arterial -2.3 (L) -2.0 - 3.0 mmol/L    POCT HCO3 Calculated, Arterial 23.2 22.0 - 26.0 mmol/L    POCT Hemoglobin, Arterial 8.4 (L) 13.5 - 17.5 g/dL    POCT Anion Gap, Arterial 10 10 - 25 mmo/L    Patient Temperature 37.0 degrees Celsius    FiO2 70 %   Prepare Platelets: 1 Units   Result Value Ref Range    PRODUCT CODE [id number]     Unit Number [id number]     Unit ABO A     Unit RH POS      Dispense Status TR     Blood Expiration Date April 05, 2024 23:59 EDT     PRODUCT BLOOD TYPE 6200     UNIT VOLUME 259    TEG Clot Global Profile   Result Value Ref Range    R (Reaction Time) K 9.8 (H) 4.6 - 9.1 min    K (Clot Kinetics) 1.8 0.8 - 2.1 min    ANGLE 69.6 63.0 - 78.0 deg    MA (Max Amplitude) K 56.3 52.0 - 69.0 mm    R (Reaction Time) KH 10.0 (H) 4.3 - 8.3 min    MA (Max Amplitude) RT 56.8 52.0 - 70.0 mm    MA ( Ramiro Amplitude) FF 21.9 15.0 - 32.0 mm    FLEV 400 278 - 581 mg/dL   Fibrinogen   Result Value Ref Range    Fibrinogen 270 200 - 400 mg/dL   Magnesium   Result Value Ref Range    Magnesium 2.17 1.60 - 2.40 mg/dL   Calcium, Ionized   Result Value Ref Range    POCT Calcium, Ionized 0.96 (L) 1.1 - 1.33 mmol/L   Hepatic Function Panel   Result Value Ref Range    Albumin 3.1 (L) 3.4 - 5.0 g/dL    Bilirubin, Total 26.1 (H) 0.0 - 1.2 mg/dL    Bilirubin, Direct 19.5 (H) 0.0 - 0.3 mg/dL    Alkaline Phosphatase 171 (H) 33 - 136 U/L    ALT 92 (H) 10 - 52 U/L    AST 96 (H) 9 - 39 U/L    Total Protein 4.9 (L) 6.4 - 8.2 g/dL   Coagulation Screen   Result Value Ref Range    Protime 14.7 (H) 9.8 - 12.8 seconds    INR 1.3 (H) 0.9 - 1.1    aPTT 39 (H) 27 - 38 seconds   CBC   Result Value Ref Range    WBC 2.8 (L) 4.4 - 11.3 x10*3/uL    nRBC 1.8 (H) 0.0 - 0.0 /100 WBCs    RBC 2.50 (L) 4.50 - 5.90 x10*6/uL    Hemoglobin 7.5 (L) 13.5 - 17.5 g/dL    Hematocrit 21.9 (L) 41.0 - 52.0 %    MCV 88 80 - 100 fL    MCH 30.0 26.0 - 34.0 pg    MCHC 34.2 32.0 - 36.0 g/dL    RDW 19.9 (H) 11.5 - 14.5 %    Platelets 53 (L) 150 - 450 x10*3/uL   Phosphorus   Result Value Ref Range    Phosphorus 5.2 (H) 2.5 - 4.9 mg/dL   Basic Metabolic Panel   Result Value Ref Range    Glucose 250 (H) 74 - 99 mg/dL    Sodium 132 (L) 136 - 145 mmol/L    Potassium 5.0 3.5 - 5.3 mmol/L    Chloride 95 (L) 98 - 107 mmol/L    Bicarbonate 21 21 - 32 mmol/L    Anion Gap 21 (H) 10 - 20 mmol/L    Urea Nitrogen 57 (H) 6 - 23 mg/dL    Creatinine 2.60 (H) 0.50  - 1.30 mg/dL    eGFR 27 (L) >60 mL/min/1.73m*2    Calcium 7.6 (L) 8.6 - 10.6 mg/dL   Blood Culture    Specimen: Peripheral Venipuncture; Blood culture   Result Value Ref Range    Blood Culture Loaded on Instrument - Culture in progress    Blood Culture    Specimen: Peripheral Venipuncture; Blood culture   Result Value Ref Range    Blood Culture Loaded on Instrument - Culture in progress    Blood Gas Arterial Full Panel   Result Value Ref Range    POCT pH, Arterial 7.33 (L) 7.38 - 7.42 pH    POCT pCO2, Arterial 41 38 - 42 mm Hg    POCT pO2, Arterial 108 (H) 85 - 95 mm Hg    POCT SO2, Arterial 100 94 - 100 %    POCT Oxy Hemoglobin, Arterial 95.6 94.0 - 98.0 %    POCT Hematocrit Calculated, Arterial 26.0 (L) 41.0 - 52.0 %    POCT Sodium, Arterial 127 (L) 136 - 145 mmol/L    POCT Potassium, Arterial 5.3 3.5 - 5.3 mmol/L    POCT Chloride, Arterial 95 (L) 98 - 107 mmol/L    POCT Ionized Calcium, Arterial 1.11 1.10 - 1.33 mmol/L    POCT Glucose, Arterial 225 (H) 74 - 99 mg/dL    POCT Lactate, Arterial 2.3 (H) 0.4 - 2.0 mmol/L    POCT Base Excess, Arterial -4.1 (L) -2.0 - 3.0 mmol/L    POCT HCO3 Calculated, Arterial 21.6 (L) 22.0 - 26.0 mmol/L    POCT Hemoglobin, Arterial 8.8 (L) 13.5 - 17.5 g/dL    POCT Anion Gap, Arterial 16 10 - 25 mmo/L    Patient Temperature 37.0 degrees Celsius    FiO2 60 %   POCT GLUCOSE   Result Value Ref Range    POCT Glucose 217 (H) 74 - 99 mg/dL        Assessment/Plan   Jason Hollins is a 63 y.o. 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 Hawkins and Dr. Smith on 3/5/24.  Post operative course was uncomplicated and patient was on RNF until 3/20 for prolonged bed rest to avoid hip flexion to maintain flap integrity.  Patient was on prophylactic lovenox while on bedrest.     3/20: Cardiopulmonary arrest  s/p CPR with ROSC after TPA, overnight started on heparin, epo, increased pressor requirements, increased swelling at flap site.     3/21: Hemorrhagic shock, MTP. Firm and large right flap site. Return to OR s/p Exploration of right thigh wound, Evacuation of hematoma. Suture ligation of multiple bleeding vessel and several points in gluteal area.     4/1: s/p Trach    4/4: return OR with ENT s/p esophagoscopy and bronchoscopy, trach revision. Found extensive clot burden in esophagus and stomach as well as in the lungs.    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 Hawkins and Dr. Smith on 3/5/24 s/p cardiopulmonary arrest with ROSC 3/20. CT head 3/22 without acute process. Weaned off cisatracurium and propofol overnight 3/23-3/24. Ketamine weaned off 3/25 and Fentanyl weaned off 3/26 am. Repeat CT Head 3/26 without acute process. MRI Brain 3/30, negative for cerebral infarction or hemorrhage. Neuro exam - sedated, no movement this am  - ongoing neuro and pain assessments  - continue propofol, titrate to RASS goal-4 to -5 in the setting of worsened clinical status  - NMB reversed on return to ICU  - PT/OT -> PROM  - no need for soft wrist restraints at this time -> continue to reassess     CV: History of HTN, HLD. Acute cardiopulmonary arrest 2/2 to possible massive PE vs massive STEMI, received multiple rounds of CPR and one defibrillation.  Sustained ROSC achieved after TPA bolus. On high dose levophed, epinephrine, vaso, angioT2. Plan on 3/21 was for ECMO which was aborted secondary to large hemhorrge at right flap site. Had MTP and taken OR with 5L evacuated. ECHO 3/21: Normal LV, Mod enlarged RV, slight suggestion of a Townsend's sign / RV strain, low normal RV function. ECHO 3/22 with hyperdynamic LV. New onset  Afib with RVR overnight 3/22-3/23, dosed with 150mg amio x2, started infusion at 1mg/min thereafter. AT2 weaned off. Amio infusion discontinued 3/25. Lactate downtrending. Vaso and epi weaned off. Remains in NSR. iEpo weaned off 3/27. Repeat TTE 3/29 and 4/2 essentially unchanged. Levo resumed 4/2 evening to continue aggressive fluid removal. Increased requirement overnight in the setting of bleeding, on levo 0.1mcg/kg/min at time of exam  - continuous EKG/abp/cvp monitoring  - Goal map range 65-90  - wean levophed as tolerated  - Continue midodrine 20mg q8h  - Continue florinef 0.1mg BID   - Continue SDS with hydrocortisone 50mg q12h -> increase frequency to q6h  - Holding heparin infusion for now given bleeding from trach site     PULM: Arrived to SICU intubated julio c-CPR. Concern for massive PE. Started on iEpo, currently on 0.05. Hypoxic respiratory failure. Severe ARDS. ETT exchanged 3/23. CT Chest 3/26 with interval JOHN consolidative/ground glass opacity. S/p Tracheostomy on 4/1. Currently on SIMV 40%, 550, 18, +10, 5 PS. PIP up to 41 with Vt ~250-300. RT able to suction out clot and PIP improved to 26 and Vt back to ~500. Bedside bronch -> some blood in trach, posterior wall tissue just distal to trach tip appears to be collapsing on cannula.   - nebulized TXA 500mg q8h x3 doses  - Wean FiO2 as tolerated  - ABGs prn  - additional pulm toilet prn  - daily CXR    ENT: Had epistaxis 3/23, completed afrin bid x3 days. S/p trach on 4/1. Bleeding from trach site 4/2 am, packing placed by ENT. Repeat episode of bloody tracheal secretions 4/3 through this am. Taken back to OR with ENT as per above.  - TXA as above and holding heparin for now     GI: NPO. Shock liver, pancreatitis. Elevated TG. Elevated lactate. Consulted ACS for potential bowel ischemia as cause of persistent lactic acidosis. CT imaging 3/22 with evidence of pancreatitis. No acute surgical indication per ACS. RUQ US 3/22 with thickening of GB wall  without cholelithiasis. CT A/P 3/26 negative for acute bleed. LFTs downtrending. Worsening hyperbilirubinemia. Amylase and lipase now WNL. Repeat RUQ US 4/1 with nonspecific gallbladder wall edema and thickening which may be 2/2 generalized fluid overload, mild hepatomegaly with slight nodular contour and c/f steatosis and fibrosis, irregular hypoechoic region adjacent to hepatic veins. US liver with doppler 4/2 revealed hepatomegaly with nodular contour, mildly elevated RI in main and left hepatic arteries.  - Hepatology following, appreciate recs  - hold TFs for now  - prostat daily  - PPI for GI ppx  - Trend LFTs daily     : No history of renal disease. Baseline creatinine 0.6. Anuric RUTH. Elevated CK, downtrending. Metabolic acidosis, total body fluid overload, hyperkalemia. Started on CVVH 3/21, stopped bicarb infusion 3/22. Marino removed 3/24. Hyponatremia resolved. Stopped CVVH per Nephrology on 4/2. Net +1838 over last 24h.  - Nephrology following, appreciate recs -> recommending SLED  - RFP BID and PRN  - Replete electrolytes judiciously  - Daily bladder scan -> 6ml today     HEME: Patient was on ppx lovenox for DVT prophylaxis prior to cardiopulmonary arrest. Concern for massive PE patient received bolus of TPA during CPR. Started on heparin infusion overnight given concern for PE. Hemorrhagic shock 3/21, MTP and back to OR s/p Exploration of right thigh woundEvacuation of hematoma. Suture ligation of multiple bleeding vessel and several points in gluteal area. Hematology consulted 3/22 to r/o HLH. Transfused 1 RBC overnight 3/22-3/23. Thrombocytopenia, coagulapathy, hypofibrinogenemia. LE Duplex 3/25 +acute occlusive R soleal DVT. Received 2u PRBC and 1u FFP on 3/26. Heparin infusion discontinued 3/26 to r/o HIT and transitioned to bival. PF4 negative, resumed heparin infuision 3/27. Elevated IL2R and decreased NK function. C/f HLH (low suspicion), empirically treated with decadron (3/28-3/31).  Thrombocytopenia on 3/30 to 18k, received 5pk, did not increment. Heparin held. Thrombocytopenia likely 2/2 to consumptive process, hematology following. Received IVIG and 5pk plts 3/31. Plt count improved to 68k, now down to 44k this am. Worsening coagulopathy with INR up to 2.5. [id number] FFP, 5pk Plts, gave Vitamin K 10mg IV x1, TXA nebs as above during the day 4/3. Overnight transfused 2rbc, 1ffp, 5pk plt.   - cbc, coags bid and prn  - fibrinogen daily  - holding heparin infusion  - Hematology following, appreciate recs -> maintain Plt count >35k  - ongoing monitoring for s/s bleeding  - close surveillance of RLE and drains  - Vasc Med signed off 3/27  - maintain active T&S (4/3)    ENDO: History of DM2. A1c 7.5%. TSH WNL 1/2024. Hyperglycemia  - continue lantus 10u q12h  - q4h accuchecks and SSI #2 -> increase to #4     ID: Leukocytosis resolved, now with leukopenia. On broad antimicrobial therapy. MRSA screen + 2/28/24. Pan cultures sent 3/22. Sputum NTF, +MRSA screen (completed 5 day course mupirocin), UCx and BCx negative. Completed 7 day course vanc/zosyn 3/27. Febrile to 39.1 4/3, resent blood cultures and BAL and started vanco and zosyn. Blood cultures with GNB. Switched zosyn to reynaldo, kept on vanco, added john  - temp q4h, wbc daily  - F/U blood culture results  - repeat blood cultures   - F/U BAL  - resume vanc and zosyn  - ongoing monitoring for s/s infection  - new central access when more stable clinically    Lines:  - right brachial arterial line (3/20)  - RIJ trialysis (3/27)  - left subclavian MAC with mini MAC (3/20)  - PIV x1     I have discussed the case with the resident/advanced practice provider. I have personally performed a history, physical exam, and my own medical decision making. I have reviewed the note and agree with the findings and plan with the following exceptions as identified below. I have personally provided 41 minutes of critical care time exclusive of time spent on  separately billable procedures. Time includes review of laboratory data, radiology results, discussion with consultants, family and monitoring for potential decompensation. Interventions were performed as documented above.      Family/Surrogate updated with plan of care.  Code status addressed/up to date.     Alan Xiong MD  SICU phone [phone]

## 2024-04-04 NOTE — PROGRESS NOTES
SUPPORTIVE AND PALLIATIVE ONCOLOGY INPATIENT FOLLOW-UP      SERVICE DATE: 04/04/24     SUBJECTIVE:  Interval Events:  S/p trach c/b bleeding s/p OR today for s/p esophagoscopy and bronchoscopy, trach revision, extensive clot burden in esophagus and stomach as well as in the lungs   Continues in ICU, worsening liver function Hepatology consulted  Continues on CVVH  On pressors    Ongoing goals of care discussions due to worsening clinical status      Pain Assessment:  Sedated on propofol    Symptom Assessment:  Unable to obtain secondary to sedated      Information obtained from: chart review, discussion with RN, and discussion with primary team  ______________________________________________________________________        OBJECTIVE:    Lab Results   Component Value Date    WBC 2.8 (L) 04/04/2024    HGB 7.5 (L) 04/04/2024    HCT 21.9 (L) 04/04/2024    MCV 88 04/04/2024    PLT 53 (L) 04/04/2024      Lab Results   Component Value Date    GLUCOSE 243 (H) 04/04/2024    CALCIUM 8.2 (L) 04/04/2024     (L) 04/04/2024    K 5.2 04/04/2024    CO2 23 04/04/2024    CL 93 (L) 04/04/2024    BUN 46 (H) 04/04/2024    CREATININE 2.08 (H) 04/04/2024     Lab Results   Component Value Date     (H) 04/04/2024     (H) 04/04/2024    GGT 21 02/13/2024    ALKPHOS 193 (H) 04/04/2024    BILITOT 28.1 (H) 04/04/2024     Estimated Creatinine Clearance: 48.9 mL/min (A) (by C-G formula based on SCr of 2.08 mg/dL (H)).     Scheduled medications  [MAR Hold] fludrocortisone, 0.1 mg, nasogastric tube, q12h  [MAR Hold] hydrocortisone sodium succinate, 50 mg, intravenous, q6h  [MAR Hold] insulin glargine, 10 Units, subcutaneous, q12h  [MAR Hold] insulin lispro, 0-10 Units, subcutaneous, q4h  [MAR Hold] meropenem, 2 g, intravenous, q8h  [MAR Hold] micafungin, 100 mg, intravenous, q24h  [MAR Hold] midodrine, 20 mg, orogastric tube, q8h  [MAR Hold] pantoprazole, 40 mg, intravenous, BID  [Held by provider] piperacillin-tazobactam, 3.375  g, intravenous, q6h      Continuous medications  [Held by provider] heparin, 0-4,000 Units/hr, Last Rate: Stopped (04/03/24 0425)  lactated Ringer's, 5 mL/hr, Last Rate: 5 mL/hr (04/04/24 0800)  norepinephrine, 0.01-0.2 mcg/kg/min (Dosing Weight), Last Rate: 0.1 mcg/kg/min (04/04/24 0800)  propofol, 5-50 mcg/kg/min, Last Rate: 25 mcg/kg/min (04/04/24 1122)      PRN medications  [MAR Hold] alteplase, 2 mg, PRN  [MAR Hold] calcium gluconate, 1 g, q6h PRN  [MAR Hold] calcium gluconate, 2 g, q6h PRN  [MAR Hold] dextrose, 25 g, q15 min PRN   Or  [MAR Hold] glucagon, 1 mg, q15 min PRN  [MAR Hold] magnesium sulfate, 2 g, q6h PRN  [MAR Hold] magnesium sulfate, 4 g, q6h PRN  oxygen, , Continuous PRN - O2/gases  sterile water, , PRN  surgical lubricant, , PRN  [MAR Hold] vancomycin, , Daily PRN      }     PHYSICAL EXAMINATION:    Vital Signs:   Vital signs reviewed  Visit Vitals  /53   Pulse 91   Temp 37 °C (98.6 °F) (Temporal)   Resp 20        Critical-Care Pain Observation Score:  [0-2]        Physical Exam  Vitals and nursing note reviewed.   Constitutional:       Appearance: He is ill-appearing.      Interventions: He is intubated.   HENT:      Head:      Comments: edematous  Neck:      Comments: trach  Cardiovascular:      Rate and Rhythm: Normal rate.   Pulmonary:      Effort: He is intubated.   Skin:     Coloration: Skin is jaundiced.   Neurological:      Comments: sedated          ASSESSMENT/PLAN:  Jason Hollins is a 63 y.o. 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 Hawkins and Dr. Smith on 3/5/24). Post operative course was uncomplicated and patient was on RMF for prolonged bed rest to avoid hip flexion to maintain flap integrity. On 3/20, pt arrested after getting up to walk and experiencing sudden chest heaviness. ROSC was achieved  after 1 round CPR, but then pt decompensated into PEA where CPR was resumed. Pt subsequently sent to CTICU with c/f STEMI vs massive PE. Sustained ROSC eventually achieved s/p tpa c/b bleeding. Course complicated by hypotension on pressors, metabolic acidosis requiring CVVH, pancreatitis, and hypoxic respiratory failure on MV. Supportive and Palliative Oncology is consulted family support.       At Risk For Pain  2/2 known malignancy, post-operative period, and pancreatitis  Type: Likely somatic and visceral  Pain control: Well-controlled  Home regimen: Gabapentin 300mg PO 1-2 capsules daily, Ibuprofen 800mg PO q8h PRN   Intolerances/previously tried: None identified  Personalized pain goal: Unable to establish  Total OME usage for the past 24 hours: 0 OME  Currently sedated with propofol  Continue to monitor pain scores and administer PRN medications as appropriate  Continue/initiate nonpharmacologic pain management strategies including ice/heat therapy, distraction techniques, deep breathing/relaxation techniques, calming music, and repositioning  Continue to monitor for signs of opioid efficacy (pain scores, improved functionality) and toxicity (pinpoint pupils, excess sedation/drowsiness/confusion, respiratory depression, etc.)     Constipation  At risk for constipation related to opioids, currently constipated  Home regimen: None  LBM: 3/22  Consider starting Miralax 17g through OGT daily and/or Fely-Colace 2 tabs through OGT BID  Monitor BM frequency, adjust regimen as needed  Goal to have BM without straining q48-72h      Medical Decision Making/Goals of Care/Advance Care Planning:  (3/25/24, per discussion with pt's wife)  Patient's current clinical condition, including diagnosis, prognosis, and management plan, and goals of care were discussed.   Life limiting disease: Current shock--likely cardiogenic in nature, existing malignancy  Family: Supportive, pt's wife and 2 kids, lots of extended  family  Performance status: Major limitations due to disease process, hemodynamic instability, and AMS.  Joys/meaning/strength: Family, [place], and [place]  Understanding of health: Pt's wife demonstrates good prognostic understanding of disease process, understands plan for weaning pt's sedation to assess mental status.   Information: Wants full disclosure, appreciates honesty/transparency  Medical update: Per primary team, wife feels CTICU staff has been keeping her well informed.   Goals: Wean pt's sedation to assess mentation, get off blood pressure medications, keep pt stable  Worries and fears now and future: Death   Minimum acceptable outcome/QOL: Pt's wife and family ok for pt to receive a trache and remain on dialysis  Code status discussion: Wife would like pt to remain Full Code, fully appreciates the implications of CPR and intubation if necessary     4/4/2024:  Ongoing goals of care discussions, recent discussion with primary team due to worsening clinical status, pt remains Full code, pt hopeful for recovery. Supportive Oncology  following for support. Will continue to support family and engage in ongoing goals of care conversations.     Advance Directives  Existence of Advance Directives: Patient unable to complete  Decision maker: Surrogate decision maker is pt's wife, Lakisha Hollins, [phone]  Code Status: Full Code     Supportive Interventions: Interventions: SPO Spiritual Care: appreciate  support     Disposition:  Please  start the process of having prior authorization with meds to beds deliver medications to patient prior to discharge via [facility] pharmacy. Prescriptions will need to be sent 48-72 hours prior to discharge so that a prior authorization can be completed.      Discharge date pending [facility] discussion, clinical course.    Signature and billing:  Thank you for allowing us to participate in the care of this patient. Recommendations will be communicated back to the  consulting service by way of shared electronic medical record or face-to-face.    Medical complexity was high level due to due to complexity of problems, extensive data review, and high risk of management/treatment.    Data:   Diagnostic tests and information reviewed for today's visit:  Conversation with primary team, Conversation with RN, Most recent labs and imaging results, Medications       Some elements copied from YARED Patricio CNP note on 3/25/2024, the elements have been updated and all reflect current decision making from today, 04/04/24       Plan of Care discussed with: Provider, RN, and     Thank you for asking Supportive and Palliative Oncology to assist with care of this patient.  We will continue to follow  Please contact us for additional questions or concerns.      SIGNATURE: PHILLIP Sandoval-CNP, DNP   PAGER/CONTACT:  Contact information:  Supportive and Palliative Oncology  Monday-Friday 8 AM-5 PM  Epic Secure chat or pager [phone].  After hours and weekends:  pager [phone]

## 2024-04-04 NOTE — INTERVAL H&P NOTE
H&P reviewed. The patient was examined and due to bloody drainage per oral cavity and tracheostomy plan for OR exploration of trach site and oral cavity

## 2024-04-04 NOTE — ANESTHESIA PREPROCEDURE EVALUATION
Patient: Jason Hollins    Procedure Information       Date/Time: 04/04/24 1033    Procedures:       Direct Laryngoscopy      Bronchoscopy      Esophagoscopy      possible control of hemorrhage    Location: [facility] OR 04 / Virtual [facility]    Surgeons: Phoenix Wolfe MD        Jason Hollins is a 63 y.o. 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 Post operative course was uncomplicated and patient was on RNF until 3/20 for prolonged bed rest to avoid hip flexion to maintain flap integrity. However on 3/20, patient developed a PE arrest and was coded for an hour receiving TPA with ROSC. Pt was thought to have HLH partially meeting criteria so started on dex 10mg/m2 3/28 without significant improvement. Now with dropping plts, smear reviewed again on 3/31 showing toxic granules and left shifted neutrophils suggesting ongoing/recent infection or significant stressor. Plts decreased in number but responding to transfusion. Not on abx or recent medications that could cause drug induced thrombocytopenia but is on constant CVVH which can cause plt destruction, also with bleeding from oral mucosa which can cause physiologic consumption. Dexamethasone for HLH was stopped 3/31 and patient was given IVIG 0.4g/kg x 1 on 3/31 with mild improvement in platelets. They peaked at 68 and have fallen again to 44.     Relevant Problems   Cardiac   (+) Cardiopulmonary arrest (CMS/HCC)   (+) Hyperlipidemia      Pulmonary   (+) Pulmonary embolus (CMS/HCC)      /Renal   (+) Acute kidney injury (nontraumatic) (CMS/HCC)      Endocrine   (+) Class 2 obesity with body mass index (BMI) of 38.0 to 38.9 in adult   (+) DM type 2 without retinopathy (CMS/HCC)   (+) Diabetes mellitus type 2 in obese   (+) Severe obesity (BMI 35.0-39.9) with  comorbidity (CMS/HCC)   (+) Type 2 diabetes mellitus with hyperglycemia (CMS/HCC)      Hematology   (+) Anemia   (+) Thrombocytopenia (CMS/HCC)      ID   (+) Candidal intertrigo   (+) Viral illness     Past Medical History:   Diagnosis Date    Diabetes mellitus (CMS/HCC)     Hyperlipidemia     Morbid (severe) obesity due to excess calories (CMS/HCC) 05/31/2016    Severe obesity (BMI 35.0-39.9) with comorbidity      Past Surgical History:   Procedure Laterality Date    CT GUIDED PERCUTANEOUS BIOPSY MUSCLE  11/13/2023    CT GUIDED PERCUTANEOUS BIOPSY MUSCLE 11/13/2023 [facility] CT    INVASIVE VASCULAR PROCEDURE N/A 3/20/2024    Procedure: Pulmonary Angiogram;  Surgeon: Axel Wolfe MD;  Location: [place] [id number] Cardiac Cath Lab;  Service: Cardiovascular;  Laterality: N/A;    OTHER SURGICAL HISTORY  05/31/2016    History Of Prior Surgery      Social Connections: Not on file        Chemistry    Lab Results   Component Value Date/Time     (L) 04/04/2024 0016    K 5.2 04/04/2024 0016    CL 93 (L) 04/04/2024 0016    CO2 23 04/04/2024 0016    BUN 46 (H) 04/04/2024 0016    CREATININE 2.08 (H) 04/04/2024 0016    Lab Results   Component Value Date/Time    CALCIUM 8.2 (L) 04/04/2024 0016    ALKPHOS 193 (H) 04/04/2024 0016     (H) 04/04/2024 0016     (H) 04/04/2024 0016    BILITOT 28.1 (H) 04/04/2024 0016          Lab Results   Component Value Date/Time    WBC 2.9 (L) 04/04/2024 0618    HGB 7.9 (L) 04/04/2024 0618    HCT 23.3 (L) 04/04/2024 0618    PLT 48 (L) 04/04/2024 0618     Lab Results   Component Value Date/Time    PROTIME 17.5 (H) 04/04/2024 0016    INR 1.5 (H) 04/04/2024 0016     Encounter Date: 03/05/24   Electrocardiogram, 12-lead PRN ACS symptoms   Result Value    Ventricular Rate 150    Atrial Rate 144    QRS Duration 108    QT Interval 310    QTC Calculation(Bazett) 489    R Axis 8    T Axis -12    QRS Count 25    Q Onset 220    T Offset 375    QTC Fredericia 420    Narrative    Atrial fibrillation with rapid  ventricular response  Low voltage QRS  Nonspecific ST abnormality  Abnormal ECG  When compared with ECG of 20-MAR-2024 18:41,  Atrial fibrillation has replaced Sinus rhythm  Right bundle branch block is no longer Present  Criteria for Anterior infarct are no longer Present  Criteria for Anterolateral infarct are no longer Present  Criteria for Inferior infarct are no longer Present  Confirmed by Damien Choe ([id number]) on 3/27/2024 1:49:03 PM     Medication Documentation Review Audit       Reviewed by Anna Del Rosario, PharmD (Pharmacist) on 24 at 2332      Medication Order Taking? Sig Documenting Provider Last Dose Status   aspirin 81 mg EC tablet [id number] Yes Take 1 tablet (81 mg) by mouth once daily. AS DIRECTED. Historical Provider, MD Past Week Active   atorvastatin (Lipitor) 40 mg tablet [id number] No Take 1 tablet (40 mg) by mouth once daily. RAQUEL Gregory 3/3/2024 pm  23 2359   blood sugar diagnostic (Blood Glucose Test) strip [id number] Yes once daily. One Drop Test In Vitro Strip; Test Historical Provider, MD Past Week Active   chlorhexidine (Hibiclens) 4 % external liquid [id number]  Apply topically 2 times a day for 5 days. RAQUEL Kellogg   24 2359   chlorhexidine (Peridex) 0.12 % solution [id number] Yes Use 15 mL in the mouth or throat see administration instructions for 2 doses. Use the night before and morning of surgery. RAQUEL Kellogg 3/5/2024 Active   dulaglutide (Trulicity) 4.5 mg/0.5 mL pen injector [id number] Yes Inject 4.5 mg under the skin 1 (one) time per week. RAQUEL Gregory 2024 Active   dulaglutide (TRULICITY) 4.5 mg/0.5 mL pen injector [id number] Yes INJECT ONE PEN (4.5 MG) UNDER THE SKIN ONCE WEEKLY RAQUEL Gregory 2024 Active   ergocalciferol (Vitamin D-2) 1.25 MG (72046 UT) capsule [id number] Yes Take 1 capsule (50,000 Units) by mouth every 14 (fourteen) days. twice monthly on the  and the    for vitamin d deficiency RAQUEL Gregory 2/29/2024 Active   fish oil concentrate (Omega-3) 120-180 mg capsule [id number] Yes Take 1 capsule (1 g) by mouth. Historical Provider, MD 3/3/2024 Active   gabapentin (Neurontin) 300 mg capsule [id number] Yes Use the 100 mg capsule to titrate to 300 mg , then use the 300 mg capsule and take 1 to 2 capsules hs for pain Basilia Durbin MD Past Month Active   ibuprofen 800 mg tablet [id number] Yes Take 1 tablet (800 mg) by mouth 3 times a day as needed for mild pain (1 - 3) (pain). ARQUEL Gregory Past Month Active   metFORMIN (Glucophage) 1,000 mg tablet [id number] Yes TAKE 1 TABLET BY MOUTH EVERY MORNING AND 1.5 TABLETS BY MOUTH EVERY EVENING , RAQUEL Gregory 3/4/2024 Active   multivitamin tablet [id number] Yes Take 1 tablet by mouth once daily. Historical Provider, MD 3/4/2024 Active   NIFEdipine ER (NIFEdipine XL) 30 mg 24 hr tablet [id number] No Take 1 tablet (30 mg) by mouth once daily in the morning. Take before meals. Do not crush, chew, or split.   Patient not taking: Reported on 3/5/2024    Chiki Carbajal MD Not Taking discontinued2/29 Active   OneTouch Ultra Test strip [id number] No Test blood sugar once daily   Patient not taking: Reported on 3/5/2024    RAQUEL Gregory Not Taking Active   sildenafil (Viagra) 100 mg tablet [id number] No Take 1 tablet (100 mg) by mouth once daily as needed (1 hour before needed).   Patient not taking: Reported on 3/5/2024    RAQUEL Gregory Not Taking Active   tiZANidine (Zanaflex) 4 mg capsule [id number] No Take 1 capsule (4 mg) by mouth 3 times a day.   Patient not taking: Reported on 3/5/2024    RAQUEL Gregory Not Taking pt requests refill Active                   No results found for this or any previous visit from the past 1095 days.   Clinical information reviewed:   Tobacco  Allergies  Meds  Problems  Med Hx  Surg Hx   Fam Hx  Soc   Hx      ZOYA   CONCLUSIONS:4/2/2024   1. Left ventricular systolic function is normal with a 65-70% estimated ejection fraction.   2. Poorly visualized anatomical structures due to suboptimal image quality.   3. Right ventricular volume overload.   4. RV not well seen: in some views possibly dilated with mildly reduced function, but not quantified.   5. There is reduced right ventricular systolic function.   6. Compared with the prior exam from 3/29/2024, both studies were technically difficult and limited. Overall no significant changes.        NPO Detail:  No data recorded     Physical Exam    Airway  Mallampati: unable to assess     Cardiovascular    Dental    Pulmonary    Abdominal      Other findings: Trach           Anesthesia Plan    History of general anesthesia?: yes  History of complications of general anesthesia?: no    ASA 3     general     inhalational and intravenous induction   Anesthetic plan and risks discussed with spouse.  Use of blood products discussed with spouse who consented to blood products.

## 2024-04-04 NOTE — NURSING NOTE
Patient down to CT scan with SICU Fellow Brent. At CT scan patient became hypoxic and hypotensive. Patient bagged by RT. Norepinephrine titrated to 0.2 and vasopressin started at 0.03. CT scan unable to completed. Patient returned to CTICU bed 5 with SICU team at bedside.

## 2024-04-04 NOTE — PROGRESS NOTES
[facility]  [facility] [place]  CONSULT FOLLOW-UP     Reason For Consult  hyperbilirubinemia    History Of Present Illness  Jason Hollins is a 63 y.o. male presenting with past medical history of DM2, HLD, myxoid chondrosarcoma. He was admitted 3/5/24 for surgical resection and is now s/p  wide resection sarcoma, sciatic nerve neurolysis of right lower extremity with right gluteal reconstruction, pedicle ALT, vastus lateralus flap, pedicle gluteal and perisformis flap with Dr. Hawkins and Dr. Smith on 3/5/24. Post operative course c/b arrest requiring CPR with ROSC after TPA for assumed large PE on 3/20. Found to have R soleal DVT. On 3/21 developed hemorrhagic shock and returned to OR for exploration of R flap site, evacuation of hematoma, suture ligation of multiple bleeding vessel sites in gluteal area and wound closure with Dr. Smith on 3/21. Post-op course further complicated by shock liver (improving), anuric RUTH requring RRT, persistent respiratory failure and encephalopathy requiring tracheostomy 4/1/2024 c/b trach site bleeding. Hepatology is consulted for hyperbilirubinemia .    SUBJECTIVE     -Yesterdya during the day, patient had persistent bleeding at tracheostomy site with increased O2 requirements. S/p multiple nasopharyngolaryngoscopy and bronchoscopies by ENT yesterday. Between the 3 scopes, clots were noted in the mouth, at the level of the vocal cords, subglottic region, pharynx, hypopharnx. Slow oozing was noted below vocal cords and subglottic oozing on two separate scopes. There was evidence of tracheal irritation (thought to be due to suctioning)   -He was given IV vitamin K 10mg at 5pm yesterday  -4/3 blood cultures are growing gram negative bacilli in 2/2 bottles. He has been started on Vanc/Meropenem.  -Overnight, he continued to have bleeding from trach stoma and tracheal secretions. Tracheoscopy and Bronchcoscopy performed overnight and large clot  "was suctioned from oropharynx but no active bleeding was seen at this time. Trach stoma was packed and epi was injected into the stoma.  -After this, On-call GI fellow called overnight with question of reflux of upper GI bleeding into airway    EXAM     Last Recorded Vitals  Blood pressure (!) 126/49, pulse 97, temperature 37.4 °C (99.3 °F), temperature source Temporal, resp. rate 20, height 1.778 m (5' 10\"), weight 125 kg (275 lb 5.7 oz), SpO2 95 %.      Intake/Output Summary (Last 24 hours) at 4/4/2024 0651  Last data filed at 4/4/2024 0500  Gross per 24 hour   Intake 2490.96 ml   Output 3690 ml   Net -1199.04 ml          Physical Exam  General: critically ill appearing, jaundiced, no acute distress  HEENT: PERRLA, EOM intact, +ve scleral icterus, bleeding around trach  Respiratory: Mechanical breath sounds, CTA bilaterally  Cardiovascular: RRR, no murmurs/rubs/gallops  Abdomen: Soft, nontender, nondistended, bowel sounds present, no masses palpated, no organomeagly, BROWN stool in rectal tube  Extremities: no edema, no asterixis  Neuro: alert and oriented, CNII-XII grossly intact, moves all 4 extremities with no focal deficits      OBJECTIVE                                                                              Medications             Current Facility-Administered Medications:     alteplase (Cathflo Activase) injection 2 mg, 2 mg, intra-catheter, PRN, Remy Peter, APRN-CNP    calcium gluconate in NS IV 1 g, 1 g, intravenous, q6h PRN, Remy Peter APRN-CNP, Stopped at 04/04/24 0442    calcium gluconate in NS IV 2 g, 2 g, intravenous, q6h PRN, Remy Peter APRN-CNP, Last Rate: 100 mL/hr at 03/26/24 1458, 2 g at 03/26/24 1458    dextrose 50 % injection 25 g, 25 g, intravenous, q15 min PRN **OR** glucagon (Glucagen) injection 1 mg, 1 mg, intramuscular, q15 min PRN, Remy Peter, APRN-CNP    fludrocortisone (Florinef) tablet 0.1 mg, 0.1 mg, nasogastric tube, q12h, Remy Peter, " PHILLIP-CNP, 0.1 mg at 04/03/24 0102    [Held by provider] heparin 25,000 Units in dextrose 5% 250 mL (100 Units/mL) infusion (premix), 0-4,000 Units/hr, intravenous, Continuous, PHILLIP Alejo-CNP, Stopped at 04/03/24 0425    hydrocortisone sod succ (PF) (Solu-CORTEF) injection 50 mg, 50 mg, intravenous, q12h, PHILLIP Alejo-CNP, 50 mg at 04/04/24 0412    insulin glargine (Lantus) injection 10 Units, 10 Units, subcutaneous, q12h, PHILLIP Alejo-CNP, 10 Units at 04/03/24 2041    insulin lispro (HumaLOG) injection 0-10 Units, 0-10 Units, subcutaneous, q4h, PHILLIP Alejo-CNP, 2 Units at 04/04/24 0411    lactated Ringer's infusion, 5 mL/hr, intravenous, Continuous, RAQUEL Alejo, Last Rate: 5 mL/hr at 04/04/24 0500, 5 mL/hr at 04/04/24 0500    magnesium sulfate IV 2 g, 2 g, intravenous, q6h PRN, PHILLIP Alejo-CNP, Stopped at 03/24/24 1726    magnesium sulfate IV 4 g, 4 g, intravenous, q6h PRN, PHILLIP Alejo-CNP, Stopped at 03/21/24 0706    meropenem (Merrem) 2 g in sodium chloride 0.9 % 100 mL IV, 2 g, intravenous, q8h, Henry Harvey MD, Stopped at 04/04/24 0043    micafungin (Mycamine) 100 mg in dextrose 5 % in water (D5W) 100 mL IV, 100 mg, intravenous, q24h, Henry Harvey MD, Stopped at 04/04/24 0600    midodrine (Proamatine) tablet 20 mg, 20 mg, orogastric tube, q8h, PHILLIP Alejo-CNP, 20 mg at 04/03/24 1003    norepinephrine (Levophed) 8 mg in dextrose 5% 250 mL (0.032 mg/mL) infusion (premix), 0.01-0.2 mcg/kg/min (Dosing Weight), intravenous, Continuous, RAQUEL Alejo, Last Rate: 15.15 mL/hr at 04/04/24 0530, 0.08 mcg/kg/min at 04/04/24 0530    oxygen (O2) therapy, , inhalation, Continuous PRN - O2/gases, RAQUEL Alejo, Rate Change at 04/04/24 0507    pantoprazole (ProtoNix) injection 40 mg, 40 mg, intravenous, BID, Henry Harvey MD    [Held by provider]  piperacillin-tazobactam-dextrose (Zosyn) IV 3.375 g, 3.375 g, intravenous, q6h, RAQUEL Alejo, Stopped at 04/03/24 2308    propofol (Diprivan) infusion, 5-50 mcg/kg/min, intravenous, Continuous, RAQUEL Alejo, Last Rate: 18.75 mL/hr at 04/04/24 0554, 25 mcg/kg/min at 04/04/24 0554    tranexamic acid (Cyklokapron) 500 mg in sodium chloride 0.9% 8 mL inhalation, 500 mg, nebulization, q8h, PHILLIP Alejo-CNP, 500 mg at 04/03/24 2311    vancomycin (Vancocin) pharmacy to dose - pharmacy monitoring, , miscellaneous, Daily PRN, RAQUEL Alejo    vasopressin (Vasostrict) infusion  - Omnicell Override Pull, , , ,                                                                             Labs     Results for orders placed or performed during the hospital encounter of 03/05/24 (from the past 24 hour(s))   Renal Function Panel   Result Value Ref Range    Glucose 140 (H) 74 - 99 mg/dL    Sodium 135 (L) 136 - 145 mmol/L    Potassium 3.8 3.5 - 5.3 mmol/L    Chloride 97 (L) 98 - 107 mmol/L    Bicarbonate 23 21 - 32 mmol/L    Anion Gap 19 10 - 20 mmol/L    Urea Nitrogen 27 (H) 6 - 23 mg/dL    Creatinine 1.12 0.50 - 1.30 mg/dL    eGFR 74 >60 mL/min/1.73m*2    Calcium 8.4 (L) 8.6 - 10.6 mg/dL    Phosphorus 1.4 (L) 2.5 - 4.9 mg/dL    Albumin 3.6 3.4 - 5.0 g/dL   Magnesium   Result Value Ref Range    Magnesium 2.00 1.60 - 2.40 mg/dL   POCT GLUCOSE   Result Value Ref Range    POCT Glucose 142 (H) 74 - 99 mg/dL   POCT GLUCOSE   Result Value Ref Range    POCT Glucose 174 (H) 74 - 99 mg/dL   Blood Gas Arterial Full Panel   Result Value Ref Range    POCT pH, Arterial 7.11 (LL) 7.38 - 7.42 pH    POCT pCO2, Arterial 80 (HH) 38 - 42 mm Hg    POCT pO2, Arterial 83 (L) 85 - 95 mm Hg    POCT SO2, Arterial 96 94 - 100 %    POCT Oxy Hemoglobin, Arterial 93.6 (L) 94.0 - 98.0 %    POCT Hematocrit Calculated, Arterial 23.0 (L) 41.0 - 52.0 %    POCT Sodium, Arterial 133 (L) 136 - 145 mmol/L    POCT  Potassium, Arterial 4.2 3.5 - 5.3 mmol/L    POCT Chloride, Arterial 102 98 - 107 mmol/L    POCT Ionized Calcium, Arterial 1.11 1.10 - 1.33 mmol/L    POCT Glucose, Arterial 187 (H) 74 - 99 mg/dL    POCT Lactate, Arterial 1.9 0.4 - 2.0 mmol/L    POCT Base Excess, Arterial -4.3 (L) -2.0 - 3.0 mmol/L    POCT HCO3 Calculated, Arterial 25.4 22.0 - 26.0 mmol/L    POCT Hemoglobin, Arterial 7.7 (L) 13.5 - 17.5 g/dL    POCT Anion Gap, Arterial 10 10 - 25 mmo/L    Patient Temperature 37.0 degrees Celsius    FiO2 100 %   Blood Gas Arterial Full Panel   Result Value Ref Range    POCT pH, Arterial 7.28 (L) 7.38 - 7.42 pH    POCT pCO2, Arterial 49 (H) 38 - 42 mm Hg    POCT pO2, Arterial 321 (H) 85 - 95 mm Hg    POCT SO2, Arterial 100 94 - 100 %    POCT Oxy Hemoglobin, Arterial 97.4 94.0 - 98.0 %    POCT Hematocrit Calculated, Arterial 23.0 (L) 41.0 - 52.0 %    POCT Sodium, Arterial 132 (L) 136 - 145 mmol/L    POCT Potassium, Arterial 4.2 3.5 - 5.3 mmol/L    POCT Chloride, Arterial 102 98 - 107 mmol/L    POCT Ionized Calcium, Arterial 1.09 (L) 1.10 - 1.33 mmol/L    POCT Glucose, Arterial 178 (H) 74 - 99 mg/dL    POCT Lactate, Arterial 1.9 0.4 - 2.0 mmol/L    POCT Base Excess, Arterial -3.6 (L) -2.0 - 3.0 mmol/L    POCT HCO3 Calculated, Arterial 23.0 22.0 - 26.0 mmol/L    POCT Hemoglobin, Arterial 7.7 (L) 13.5 - 17.5 g/dL    POCT Anion Gap, Arterial 11 10 - 25 mmo/L    Patient Temperature 37.0 degrees Celsius    FiO2 100 %   Prepare Platelets: 1 Units   Result Value Ref Range    PRODUCT CODE [id number]     Unit Number [id number]     Unit ABO O     Unit RH POS     Dispense Status TR     Blood Expiration Date April 04, 2024 23:59 EDT     PRODUCT BLOOD TYPE 5100     UNIT VOLUME 342    POCT GLUCOSE   Result Value Ref Range    POCT Glucose 199 (H) 74 - 99 mg/dL   CALCIUM, IONIZED   Result Value Ref Range    POCT Calcium, Ionized 1.03 (L) 1.1 - 1.33 mmol/L   CBC   Result Value Ref Range    WBC 3.1 (L) 4.4 - 11.3 x10*3/uL    nRBC 2.6  (H) 0.0 - 0.0 /100 WBCs    RBC 2.52 (L) 4.50 - 5.90 x10*6/uL    Hemoglobin 7.3 (L) 13.5 - 17.5 g/dL    Hematocrit 22.1 (L) 41.0 - 52.0 %    MCV 88 80 - 100 fL    MCH 29.0 26.0 - 34.0 pg    MCHC 33.0 32.0 - 36.0 g/dL    RDW 21.8 (H) 11.5 - 14.5 %    Platelets 59 (L) 150 - 450 x10*3/uL   Coagulation Screen   Result Value Ref Range    Protime 28.1 (H) 9.8 - 12.8 seconds    INR 2.5 (H) 0.9 - 1.1    aPTT 40 (H) 27 - 38 seconds   Renal Function Panel   Result Value Ref Range    Glucose 201 (H) 74 - 99 mg/dL    Sodium 134 (L) 136 - 145 mmol/L    Potassium 4.6 3.5 - 5.3 mmol/L    Chloride 96 (L) 98 - 107 mmol/L    Bicarbonate 25 21 - 32 mmol/L    Anion Gap 18 10 - 20 mmol/L    Urea Nitrogen 39 (H) 6 - 23 mg/dL    Creatinine 1.76 (H) 0.50 - 1.30 mg/dL    eGFR 43 (L) >60 mL/min/1.73m*2    Calcium 8.0 (L) 8.6 - 10.6 mg/dL    Phosphorus 3.7 2.5 - 4.9 mg/dL    Albumin 3.3 (L) 3.4 - 5.0 g/dL   Magnesium   Result Value Ref Range    Magnesium 2.08 1.60 - 2.40 mg/dL   Lactate   Result Value Ref Range    Lactate 2.0 0.4 - 2.0 mmol/L   Blood Culture    Specimen: Peripheral Venipuncture; Blood culture   Result Value Ref Range    Blood Culture       Identification and susceptibility testing to follow    Gram Stain Gram negative bacilli (AA)    Blood Culture    Specimen: Peripheral Venipuncture; Blood culture   Result Value Ref Range    Blood Culture       Identification and susceptibility testing to follow    Gram Stain Gram negative bacilli (AA)    Blood Gas Arterial Full Panel   Result Value Ref Range    POCT pH, Arterial 7.15 (LL) 7.38 - 7.42 pH    POCT pCO2, Arterial 75 (HH) 38 - 42 mm Hg    POCT pO2, Arterial 253 (H) 85 - 95 mm Hg    POCT SO2, Arterial 99 94 - 100 %    POCT Oxy Hemoglobin, Arterial 96.7 94.0 - 98.0 %    POCT Hematocrit Calculated, Arterial 25.0 (L) 41.0 - 52.0 %    POCT Sodium, Arterial 130 (L) 136 - 145 mmol/L    POCT Potassium, Arterial 5.3 3.5 - 5.3 mmol/L    POCT Chloride, Arterial 97 (L) 98 - 107 mmol/L     POCT Ionized Calcium, Arterial 1.16 1.10 - 1.33 mmol/L    POCT Glucose, Arterial 238 (H) 74 - 99 mg/dL    POCT Lactate, Arterial 2.6 (H) 0.4 - 2.0 mmol/L    POCT Base Excess, Arterial -3.2 (L) -2.0 - 3.0 mmol/L    POCT HCO3 Calculated, Arterial 26.1 (H) 22.0 - 26.0 mmol/L    POCT Hemoglobin, Arterial 8.4 (L) 13.5 - 17.5 g/dL    POCT Anion Gap, Arterial 12 10 - 25 mmo/L    Patient Temperature 37.0 degrees Celsius    FiO2 100 %   CBC   Result Value Ref Range    WBC 4.6 4.4 - 11.3 x10*3/uL    nRBC 2.6 (H) 0.0 - 0.0 /100 WBCs    RBC 2.67 (L) 4.50 - 5.90 x10*6/uL    Hemoglobin 7.9 (L) 13.5 - 17.5 g/dL    Hematocrit 23.4 (L) 41.0 - 52.0 %    MCV 88 80 - 100 fL    MCH 29.6 26.0 - 34.0 pg    MCHC 33.8 32.0 - 36.0 g/dL    RDW 21.2 (H) 11.5 - 14.5 %    Platelets 58 (L) 150 - 450 x10*3/uL   POCT GLUCOSE   Result Value Ref Range    POCT Glucose 229 (H) 74 - 99 mg/dL   Prepare RBC: 1 Units   Result Value Ref Range    PRODUCT CODE [id number]     Unit Number [id number]     Unit ABO O     Unit RH POS     XM INTEP COMP     Dispense Status IS     Blood Expiration Date April 11, 2024 23:59 EDT     PRODUCT BLOOD TYPE 5100     UNIT VOLUME 350    Blood Gas Arterial Full Panel   Result Value Ref Range    POCT pH, Arterial 7.27 (L) 7.38 - 7.42 pH    POCT pCO2, Arterial 53 (H) 38 - 42 mm Hg    POCT pO2, Arterial 77 (L) 85 - 95 mm Hg    POCT SO2, Arterial 97 94 - 100 %    POCT Oxy Hemoglobin, Arterial 94.2 94.0 - 98.0 %    POCT Hematocrit Calculated, Arterial 24.0 (L) 41.0 - 52.0 %    POCT Sodium, Arterial 129 (L) 136 - 145 mmol/L    POCT Potassium, Arterial 5.3 3.5 - 5.3 mmol/L    POCT Chloride, Arterial 96 (L) 98 - 107 mmol/L    POCT Ionized Calcium, Arterial 1.12 1.10 - 1.33 mmol/L    POCT Glucose, Arterial 242 (H) 74 - 99 mg/dL    POCT Lactate, Arterial 2.9 (H) 0.4 - 2.0 mmol/L    POCT Base Excess, Arterial -2.7 (L) -2.0 - 3.0 mmol/L    POCT HCO3 Calculated, Arterial 24.3 22.0 - 26.0 mmol/L    POCT Hemoglobin, Arterial 8.0 (L) 13.5 -  17.5 g/dL    POCT Anion Gap, Arterial 14 10 - 25 mmo/L    Patient Temperature 37.0 degrees Celsius    FiO2 100 %   Blood Gas Arterial Full Panel   Result Value Ref Range    POCT pH, Arterial 7.31 (L) 7.38 - 7.42 pH    POCT pCO2, Arterial 45 (H) 38 - 42 mm Hg    POCT pO2, Arterial 137 (H) 85 - 95 mm Hg    POCT SO2, Arterial 100 94 - 100 %    POCT Oxy Hemoglobin, Arterial 96.9 94.0 - 98.0 %    POCT Hematocrit Calculated, Arterial 27.0 (L) 41.0 - 52.0 %    POCT Sodium, Arterial 128 (L) 136 - 145 mmol/L    POCT Potassium, Arterial 5.2 3.5 - 5.3 mmol/L    POCT Chloride, Arterial 95 (L) 98 - 107 mmol/L    POCT Ionized Calcium, Arterial 1.06 (L) 1.10 - 1.33 mmol/L    POCT Glucose, Arterial 229 (H) 74 - 99 mg/dL    POCT Lactate, Arterial 2.9 (H) 0.4 - 2.0 mmol/L    POCT Base Excess, Arterial -3.4 (L) -2.0 - 3.0 mmol/L    POCT HCO3 Calculated, Arterial 22.7 22.0 - 26.0 mmol/L    POCT Hemoglobin, Arterial 8.9 (L) 13.5 - 17.5 g/dL    POCT Anion Gap, Arterial 16 10 - 25 mmo/L    Patient Temperature 37.0 degrees Celsius    FiO2 100 %   CALCIUM, IONIZED   Result Value Ref Range    POCT Calcium, Ionized 1.04 (L) 1.1 - 1.33 mmol/L   CBC   Result Value Ref Range    WBC 4.8 4.4 - 11.3 x10*3/uL    nRBC 1.9 (H) 0.0 - 0.0 /100 WBCs    RBC 2.81 (L) 4.50 - 5.90 x10*6/uL    Hemoglobin 8.2 (L) 13.5 - 17.5 g/dL    Hematocrit 24.7 (L) 41.0 - 52.0 %    MCV 88 80 - 100 fL    MCH 29.2 26.0 - 34.0 pg    MCHC 33.2 32.0 - 36.0 g/dL    RDW 20.4 (H) 11.5 - 14.5 %    Platelets 57 (L) 150 - 450 x10*3/uL   Coagulation Screen   Result Value Ref Range    Protime 17.5 (H) 9.8 - 12.8 seconds    INR 1.5 (H) 0.9 - 1.1    aPTT 40 (H) 27 - 38 seconds   Hepatic function panel   Result Value Ref Range    Albumin 3.4 3.4 - 5.0 g/dL    Bilirubin, Total 28.1 (H) 0.0 - 1.2 mg/dL    Bilirubin, Direct 19.8 (H) 0.0 - 0.3 mg/dL    Alkaline Phosphatase 193 (H) 33 - 136 U/L     (H) 10 - 52 U/L     (H) 9 - 39 U/L    Total Protein 5.2 (L) 6.4 - 8.2 g/dL    Magnesium   Result Value Ref Range    Magnesium 2.13 1.60 - 2.40 mg/dL   Basic Metabolic Panel   Result Value Ref Range    Glucose 243 (H) 74 - 99 mg/dL    Sodium 132 (L) 136 - 145 mmol/L    Potassium 5.2 3.5 - 5.3 mmol/L    Chloride 93 (L) 98 - 107 mmol/L    Bicarbonate 23 21 - 32 mmol/L    Anion Gap 21 (H) 10 - 20 mmol/L    Urea Nitrogen 46 (H) 6 - 23 mg/dL    Creatinine 2.08 (H) 0.50 - 1.30 mg/dL    eGFR 35 (L) >60 mL/min/1.73m*2    Calcium 8.2 (L) 8.6 - 10.6 mg/dL   Phosphorus   Result Value Ref Range    Phosphorus 4.9 2.5 - 4.9 mg/dL   POCT GLUCOSE   Result Value Ref Range    POCT Glucose 245 (H) 74 - 99 mg/dL   Blood Gas Arterial Full Panel   Result Value Ref Range    POCT pH, Arterial 7.23 (LL) 7.38 - 7.42 pH    POCT pCO2, Arterial 58 (H) 38 - 42 mm Hg    POCT pO2, Arterial 55 (L) 85 - 95 mm Hg    POCT SO2, Arterial 88 (L) 94 - 100 %    POCT Oxy Hemoglobin, Arterial 85.2 (L) 94.0 - 98.0 %    POCT Hematocrit Calculated, Arterial 25.0 (L) 41.0 - 52.0 %    POCT Sodium, Arterial 128 (L) 136 - 145 mmol/L    POCT Potassium, Arterial 5.4 (H) 3.5 - 5.3 mmol/L    POCT Chloride, Arterial 96 (L) 98 - 107 mmol/L    POCT Ionized Calcium, Arterial 1.10 1.10 - 1.33 mmol/L    POCT Glucose, Arterial 241 (H) 74 - 99 mg/dL    POCT Lactate, Arterial 2.9 (H) 0.4 - 2.0 mmol/L    POCT Base Excess, Arterial -3.4 (L) -2.0 - 3.0 mmol/L    POCT HCO3 Calculated, Arterial 24.3 22.0 - 26.0 mmol/L    POCT Hemoglobin, Arterial 8.4 (L) 13.5 - 17.5 g/dL    POCT Anion Gap, Arterial 13 10 - 25 mmo/L    Patient Temperature 37.0 degrees Celsius    FiO2 100 %   Blood Gas Arterial Full Panel   Result Value Ref Range    POCT pH, Arterial 7.32 (L) 7.38 - 7.42 pH    POCT pCO2, Arterial 31 (L) 38 - 42 mm Hg    POCT pO2, Arterial 140 (H) 85 - 95 mm Hg    POCT SO2, Arterial 100 94 - 100 %    POCT Oxy Hemoglobin, Arterial 97.1 94.0 - 98.0 %    POCT Hematocrit Calculated, Arterial 18.0 (L) 41.0 - 52.0 %    POCT Sodium, Arterial 135 (L) 136 - 145 mmol/L     POCT Potassium, Arterial 3.5 3.5 - 5.3 mmol/L    POCT Chloride, Arterial 110 (H) 98 - 107 mmol/L    POCT Ionized Calcium, Arterial 0.83 (L) 1.10 - 1.33 mmol/L    POCT Glucose, Arterial 167 (H) 74 - 99 mg/dL    POCT Lactate, Arterial 1.7 0.4 - 2.0 mmol/L    POCT Base Excess, Arterial -9.3 (L) -2.0 - 3.0 mmol/L    POCT HCO3 Calculated, Arterial 16.0 (L) 22.0 - 26.0 mmol/L    POCT Hemoglobin, Arterial 6.1 (LL) 13.5 - 17.5 g/dL    POCT Anion Gap, Arterial 13 10 - 25 mmo/L    Patient Temperature 37.0 degrees Celsius    FiO2 100 %   POCT GLUCOSE   Result Value Ref Range    POCT Glucose 179 (H) 74 - 99 mg/dL   Prepare RBC: 2 Units   Result Value Ref Range    PRODUCT CODE [id number]     Unit Number [id number]     Unit ABO O     Unit RH POS     XM INTEP COMP     Dispense Status IS     Blood Expiration Date April 29, 2024 23:59 EDT     PRODUCT BLOOD TYPE 5100     UNIT VOLUME 350     PRODUCT CODE [id number]     Unit Number [id number]     Unit ABO O     Unit RH POS     XM INTEP COMP     Dispense Status IS     Blood Expiration Date May 01, 2024 23:59 EDT     PRODUCT BLOOD TYPE 5100     UNIT VOLUME 285    Prepare Plasma: 1 Units   Result Value Ref Range    PRODUCT CODE [id number]     Unit Number [id number]     Unit ABO B     Unit RH POS     Dispense Status IS     Blood Expiration Date April 08, 2024 05:32 EDT     PRODUCT BLOOD TYPE 7300     UNIT VOLUME 294    Prepare Platelets: 1 Units   Result Value Ref Range    PRODUCT CODE [id number]     Unit Number [id number]     Unit ABO A     Unit RH POS     Dispense Status IS     Blood Expiration Date April 04, 2024 23:59 EDT     PRODUCT BLOOD TYPE 6200     UNIT VOLUME 330    CBC   Result Value Ref Range    WBC 4.6 4.4 - 11.3 x10*3/uL    nRBC 1.1 (H) 0.0 - 0.0 /100 WBCs    RBC 2.56 (L) 4.50 - 5.90 x10*6/uL    Hemoglobin 7.6 (L) 13.5 - 17.5 g/dL    Hematocrit 22.7 (L) 41.0 - 52.0 %    MCV 89 80 - 100 fL    MCH 29.7 26.0 - 34.0 pg    MCHC 33.5 32.0 - 36.0 g/dL    RDW 20.6 (H)  11.5 - 14.5 %    Platelets 51 (L) 150 - 450 x10*3/uL   Blood Gas Arterial Full Panel   Result Value Ref Range    POCT pH, Arterial 7.32 (L) 7.38 - 7.42 pH    POCT pCO2, Arterial 45 (H) 38 - 42 mm Hg    POCT pO2, Arterial 88 85 - 95 mm Hg    POCT SO2, Arterial 99 94 - 100 %    POCT Oxy Hemoglobin, Arterial 96.2 94.0 - 98.0 %    POCT Hematocrit Calculated, Arterial 25.0 (L) 41.0 - 52.0 %    POCT Sodium, Arterial 130 (L) 136 - 145 mmol/L    POCT Potassium, Arterial 5.2 3.5 - 5.3 mmol/L    POCT Chloride, Arterial 96 (L) 98 - 107 mmol/L    POCT Ionized Calcium, Arterial 1.06 (L) 1.10 - 1.33 mmol/L    POCT Glucose, Arterial 267 (H) 74 - 99 mg/dL    POCT Lactate, Arterial 2.8 (H) 0.4 - 2.0 mmol/L    POCT Base Excess, Arterial -2.8 (L) -2.0 - 3.0 mmol/L    POCT HCO3 Calculated, Arterial 23.2 22.0 - 26.0 mmol/L    POCT Hemoglobin, Arterial 8.3 (L) 13.5 - 17.5 g/dL    POCT Anion Gap, Arterial 16 10 - 25 mmo/L    Patient Temperature 37.0 degrees Celsius    FiO2 80 %                                                                              Imagin/2/2024 US liver with doppler  IMPRESSION:  1. Patent hepatic vasculature. Nonspecific mildly elevated resistive  indices in the main and left hepatic arteries, which can be seen with  chronic liver disease or hepatic congestion among other etiologies.  2. Hepatomegaly with heterogenous echotexture and nodular contour,  similar to the prior.  3. Irregular hypoechoic region adjacent to the middle and left  hepatic veins is again noted however appears less prominent compared  to the prior study. It remains of uncertain significance.  4. Bilateral pleural effusions.    24 US RUQ  IMPRESSION:  1. Nonspecific gallbladder wall edema and thickening may relate to  generalized fluid overload. No cholelithiasis or gallbladder  distention.  2. Moderate-sized pleural effusion and trace abdominal ascites.  3. Mild hepatomegaly with heterogenous echotexture and slightly  nodular  contour, please correlate with concern for steatosis and  fibrosis.  4. Irregular hypoechoic region adjacent to the hepatic veins prior to  their confluence with the IVC, of uncertain significance. Consider  MRI for further characterization if clinical concern persists.                                                                         GI Procedures     None in EMR      ASSESSMENT / PLAN     ASSESSMENT/PLAN:  Jason Hollins is a 63 y.o. male presenting with past medical history of DM2, HLD, myxoid chondrosarcoma. He was admitted 3/5/24 for surgical resection and is now s/p  wide resection sarcoma, sciatic nerve neurolysis of right lower extremity with right gluteal reconstruction, pedicle ALT, vastus lateralus flap, pedicle gluteal and perisformis flap with Dr. Hawkins and Dr. Smith on 3/5/24. Post operative course c/b arrest requiring CPR with ROSC after TPA for assumed large PE on 3/20. Found to have R soleal DVT. On 3/21 developed hemorrhagic shock and returned to OR for exploration of R flap site, evacuation of hematoma, suture ligation of multiple bleeding vessel sites in gluteal area and wound closure with Dr. Smith on 3/21. Post-op course further complicated by shock liver (improving), anuric RUTH requring RRT, persistent respiratory failure and encephalopathy requiring tracheostomy 4/1/2024 c/b trach site bleeding. Hepatology is consulted for hyperbilirubinemia.    Suspect multifactorial causes for his persistent hyperbilirubinemia and LFT elevations. He had known ischemic event on 3/20 (cardiac arrest) with resultant ischemic liver injury/shock liver when LFTs were in the thousands. Lfts have been downtrending steadily since then. Bilirubin has continued to uptrend.  Bilirubin often lags behind the ischemic insult and is last to increase and last to peak. I suspect a component of his hyperbilirubinemia is lagging behind the ischemic insult. He likely also has a component of ICU jaundice related to  critical illness and cholestasis of sepsis given recent VAP sepsis on top of this. US liver with dopplers has ruled out large vessel thrombosis in the hepatic vasculature. Does not appear to have biliary obstruction (even though s/p cholecystectomy) based of RUQ US that was done. Noted TTE results with possible reduced RV function -- congestive hepatopathy could contribute to his current LFT elevations and hyperbilirubinemia as well. Today 4/4, his bilirubin has seemed to peak at 28.1 and has started down trending.    Regarding his bleeding. Appears to be from trach site. Tracheoscopies have shown active oozing from airway mucosa and patient has been coagulopathic since trache was placed on 4/1. Low concern for upper GI bleed that would be refluxing into airways at this time given BUN/Cr ratio has improved since trach site bleeding has gotten worse. Rectal tube has brown stool.    Recommendations  -Low concern for GI bleeding at this time, no plans for endoscopic evaluation at this time  -Appreciate ENT assistance with tracheostomy bleeding  -Continue to trend hepatic function panel per primary team  -Sepsis management and volume management per primary team  -Coagulopathy management per Heme/primary team  -Continue supportive care per primary team  -Hepatology will sign off at this time    Patient was seen and discussed with Dr. Mario    Thank you for the consultation. Hepatology will SIGN OFF.  - During weekday hours of 7am- 5pm, please do not hesitate to contact me on [employer] Chat or page [phone] if there are any further questions   - After hours, on weekends, and on holidays, please page the on-call GI fellow at [phone]. Thank you.       Jill Rubio MD  PGY4 Gastroenterology Fellow  [facility]

## 2024-04-04 NOTE — PROGRESS NOTES
Department of Plastic and Reconstructive Surgery  Daily Progress Note    Patient Name: Jason Hollins  MRN: [id number]  Date:  04/04/24     Subjective   Remains critically ill in CTICU, trach yesterday, CT scan patient became hypoxic and hypotensive overnight, continuing episodes of bleeding/oozing from trach and mouth.     Objective   Vitals:    04/04/24 0900   BP:    Pulse: 92   Resp: 20   Temp:    SpO2: 97%     Physical Exam   Constitutional: Intubated, propofol infusing  ENMT: MMM, dobhoff in R nare   Cardiovascular: RRR on telemetry monitor.  Respiratory/Thorax: Trach to vent  Gastrointestinal: Abdomen soft, protuberant.   Genitourinary: CVVHD until weaned from pressors.   Musculoskeletal: Minimal purposeful movement appreciated.   Extremities: Generalized pitting edema. Right thigh edematous, but soft and compressible, no bruising or tissue induration noted. Incisional wound vac removed at bedside. Posterior portion of flap macerated, ecchymotic/necrotic incisions well approximated, Incisions well approximated, no dehiscence, ANDERSON drain x 3 with dark serous output.  Neurological:  Sedated  Skin: Edematous, warm     Current Medications  Scheduled medications  fludrocortisone, 0.1 mg, nasogastric tube, q12h  hydrocortisone sodium succinate, 50 mg, intravenous, q6h  insulin glargine, 10 Units, subcutaneous, q12h  insulin lispro, 0-10 Units, subcutaneous, q4h  meropenem, 2 g, intravenous, q8h  micafungin, 100 mg, intravenous, q24h  midodrine, 20 mg, orogastric tube, q8h  pantoprazole, 40 mg, intravenous, BID  [Held by provider] piperacillin-tazobactam, 3.375 g, intravenous, q6h    Continuous medications  [Held by provider] heparin, 0-4,000 Units/hr, Last Rate: Stopped (04/03/24 0425)  lactated Ringer's, 5 mL/hr, Last Rate: 5 mL/hr (04/04/24 0800)  norepinephrine, 0.01-0.2 mcg/kg/min (Dosing Weight), Last Rate: 0.1 mcg/kg/min (04/04/24 0800)  propofol, 5-50 mcg/kg/min, Last Rate: 25 mcg/kg/min (04/04/24 0800)    PRN  medications  PRN medications: alteplase, calcium gluconate, calcium gluconate, dextrose **OR** glucagon, magnesium sulfate, magnesium sulfate, oxygen, vancomycin     Assessment   Jason Hollins 63 y.o. 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 Hawkins and Dr. Smith on 3/5/24. Post operative course c/b arrest requiring CPR with ROSC after TPA for assumed large PE on 3/20. Increased swelling at flap site appreciated overnight 3/20-3/21 and pt returned to OR for exploration of R flap site, evacuation of hematoma, suture ligation of multiple bleeding vessel sites in gluteal area and wound closure with Dr. Smith on 3/21. Pt has remained in CTICU for continued critical care evaluation and management postoperatively.     Plan/Recommendations  - Appreciate ongoing ICU evaluation and management following acute clinical decompensation event 3/20  - wound vac removed at bedside, ok to leave incision FAB  - Monitor right thigh for s/s of bleeding recurrence, has been stable on serial assessments of past week   - Continue ANDERSON drain care to x3 drains present at R thigh flap reconstruction site      ·  Strip drain tubing TID and PRN     ·  Monitor and record output q8h      ·  Keep drain sites C/D/I with daily drain dressing changes      ·  Notify plastics if ANDERSON drain output exceeds > 100 ml/hr in one drain or > 300 ml/hr collectively  - continue clinitron fluidized air mattress  - avoid pressure to flap   - will require debridement of tissue to posterior flap, timing TBD, in the interim will prevena incisional vac to posterior wound  - Plastic Surgery will continue to follow     Patient and plan discussed with PHILLIP Arita-CNP  Plastic and Reconstructive Surgery   Available via [employer]  Pager #[phone]  Team phone:  [phone]

## 2024-04-04 NOTE — SIGNIFICANT EVENT
Called overnight due to concern for potential UGIB. Patient has had recent concern for tracheostomy site bleeding but recent tracheoscopy/bronchoscopy has been unrevealing despite intervention of further tracheostomy stomal packing and lidocaine epinephrine injection. CTA CAP is pending. Patient last received RBC infusion 4/1/24 and PLT/FFP 4/3/24. Patient continuing to regurgitate red blood/clots intermittently but dignicare noting brown stool. Patient previously on PPI IV daily with recommendation of BID with consideration of potential stress ulceration, esophagitis, and/or OG/NGT trauma in setting of critical illness versus reflux of trachoestomy stomal bleeding into stomach/oropharynx. Patient has been on norepinephrine prior to bleeding with concern for unclear shock with downtrending requirements. Hepatology has been following patient for abnormal LFTs concerning for ischemic hepatopathy/cholangiopathy. Reviewed case with overnight attending, Dr. Bev Suggs, who is an agreement that patient will be reassessed during the day by Hepatology primary team for consideration of endoscopic evaluation.    Thank you for the consultation. Hepatology will continue to the follow the patient.  Please do not hesitate to contact me or page [phone] if there are any further questions between the weekday hours of 7 AM - 5 PM.  If there is an urgent concern during the weekend, after-hours, or holidays; then please page the [place]-call GI fellow at [phone]. Thank you.    SIGNATURE: Yessi Ching MD PATIENT NAME: Jason Hollins   DATE: April 4, 2024 MRN: [id number]

## 2024-04-04 NOTE — PROGRESS NOTES
"Name: Jason Hollins  MRN: [id number]  Encounter Date: 4/4/2024  PCP: Mary Arenas, APRN-CNP  Heme-Onc: Dr. Carbajal    Reason for consult: ?HLH now with thrombocytopenia  Attending Provider Dr. Smith    Hematology/ Oncology Consult Daily Progress Note    Overnight Events   Patient was febrile overnight with suspected continued bleeding from trach vs upper GI source. He was also febrile with 4 of 4 blood cultures growing klebsiella. He was taken to OR today with ENT to evaluate for bleeding. Found to have sequelae of bleeding from trach site s/p revision.     Received 4u RBC, 2u plt, 1u FFP since yesterday.     Review of Systems   12 Point ROS negative except HPI     Allergies   No Known Allergies    Medications     fludrocortisone, 0.1 mg, q12h  hydrocortisone sodium succinate, 50 mg, q6h  insulin glargine, 10 Units, q12h  insulin lispro, 0-20 Units, q4h  meropenem, 1 g, q12h  micafungin, 100 mg, q24h  midodrine, 20 mg, q8h  pantoprazole, 40 mg, BID  [Held by provider] piperacillin-tazobactam, 3.375 g, q6h      lactated Ringer's, Last Rate: 5 mL/hr (04/04/24 0800)  norepinephrine, Last Rate: 0.06 mcg/kg/min (04/04/24 1422)  propofol, Last Rate: 15 mcg/kg/min (04/04/24 1422)      alteplase, 2 mg, PRN  calcium gluconate, 1 g, q6h PRN  calcium gluconate, 2 g, q6h PRN  dextrose, 25 g, q15 min PRN   Or  glucagon, 1 mg, q15 min PRN  magnesium sulfate, 2 g, q6h PRN  magnesium sulfate, 4 g, q6h PRN  oxygen, , Continuous PRN - O2/gases  vancomycin, , Daily PRN        Physical Exam   Blood pressure 150/53, pulse 80, temperature 36.6 °C (97.9 °F), temperature source Temporal, resp. rate 24, height 1.778 m (5' 10\"), weight 125 kg (275 lb 5.7 oz), SpO2 97 %.    GEN: NAD, mechanically ventilated via trach, jaundiced  HEENT: NC/AT, MMM, crusted blood in mouth, around trach  CV: RRR no m/r/g   Chest: CTAB w/ mechanical breath sounds  GI: soft, NTND  MSK: no edema  Skin: no obvious rashes, +scattered ecchymoses   Neuro: trached, " sedated    Labs     Lab Results   Component Value Date    GLUCOSE 218 (H) 04/04/2024    CALCIUM 7.8 (L) 04/04/2024     (L) 04/04/2024    K 4.9 04/04/2024    CO2 22 04/04/2024    CL 97 (L) 04/04/2024    BUN 55 (H) 04/04/2024    CREATININE 2.44 (H) 04/04/2024       Lab Results   Component Value Date    WBC 3.5 (L) 04/04/2024    HGB 7.9 (L) 04/04/2024    HCT 23.2 (L) 04/04/2024    MCV 87 04/04/2024    PLT 68 (L) 04/04/2024       Lab Results   Component Value Date    ALT 92 (H) 04/04/2024    AST 96 (H) 04/04/2024    GGT 21 02/13/2024    ALKPHOS 171 (H) 04/04/2024    BILITOT 26.1 (H) 04/04/2024         Assessment/Plan     Jason Hollins is a 63 y.o. 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 Post operative course was uncomplicated and patient was on RNF until 3/20 for prolonged bed rest to avoid hip flexion to maintain flap integrity. However on 3/20, patient developed a PE arrest and was coded for an hour receiving TPA with ROSC. Pt was thought to have HLH partially meeting criteria so started on dex 10mg/m2 3/28 without significant improvement. Now with dropping plts, smear reviewed again on 3/31 showing toxic granules and left shifted neutrophils suggesting ongoing/recent infection or significant stressor. Plts decreased in number but responding to transfusion. Not on abx or recent medications that could cause drug induced thrombocytopenia but is on constant CVVH which can cause plt destruction, also with bleeding from oral mucosa which can cause physiologic consumption. Dexamethasone for HLH was stopped 3/31 and patient was given IVIG 0.4g/kg x 1 on 3/31 with mild improvement in platelets. They peaked at 68 and have fell again to 44. Bleeding likely from trach s/p revision 4/4.     Peripheral Smear Reviewed  3/31/24  WBCs: toxic appearing PMNs and few bands suggesting current or recent infection or significant stressor  RBCs: occasional [place] cells c/w renal impairment/failure, occasional target cell c/w known liver abnormalities, polychromasia, no RBC frags   Plt: reduced, no clumping     #thrombocytopenia  -likely multifactorial from critical illness, sepsis, post-arrest  -unlikely to have immune mediated component given lack of response to dexamethasone/IVIG earlier  -continue supportive transfusion as needed (hgb>7, plt>50 if bleeding, fibrinogen>150)  -continue treatment of underlying causes of critical illness    Thank you for this consult, we will sign-off. Please do not hesitate to call back with any questions. Pt seen and discussed with Dr. Cantor.    Wilmer Shane MD  Hematology-Oncology Fellow, PGY4  Hematology Consult Pager: [phone]  Oncology Consult Pager: [phone]

## 2024-04-04 NOTE — PROGRESS NOTES
Jason Hollins is a 63 y.o. male on day 30 of admission presenting with Soft tissue sarcoma (CMS/HCC).      Subjective   4/4: SLED not done yesterday as pt was deemed too hemodynamically unstable in setting of vasopressor needs with on-going trach bleeding. Remains anuric.       Objective          Vitals 24HR  Heart Rate:  []   Temp:  [36.5 °C (97.7 °F)-39.1 °C (102.4 °F)]   Resp:  [16-31]   BP: (115-155)/(48-59)   SpO2:  [87 %-99 %]         Intake/Output last 3 Shifts:    Intake/Output Summary (Last 24 hours) at 4/4/2024 1357  Last data filed at 4/4/2024 1346  Gross per 24 hour   Intake 2906.51 ml   Output 710 ml   Net 2196.51 ml         Physical Exam  General appearance: intubated, trached  Eyes: non-icteric  Skin: no apparent rash  Heart: RRR  Lungs: good airflow throughout,FiO2 70% on vent  Abdomen: soft, non-distended   Extremities: 1+ B/l leg edema  : daniels in place   Neuro: sedated  ACCESS:  right I J trialysis    Current Facility-Administered Medications:     [MAR Hold] alteplase (Cathflo Activase) injection 2 mg, 2 mg, intra-catheter, PRN, PHILLIP Alejo-CNP    [MAR Hold] calcium gluconate in NS IV 1 g, 1 g, intravenous, q6h PRN, PHILLIP Alejo-CNP, Stopped at 04/04/24 0442    [MAR Hold] calcium gluconate in NS IV 2 g, 2 g, intravenous, q6h PRN, PHILLIP Alejo-CNP, Last Rate: 100 mL/hr at 04/04/24 1118, 2 g at 04/04/24 1118    [MAR Hold] dextrose 50 % injection 25 g, 25 g, intravenous, q15 min PRN **OR** [MAR Hold] glucagon (Glucagen) injection 1 mg, 1 mg, intramuscular, q15 min PRN, PHILLIP Alejo-CNP    [MAR Hold] fludrocortisone (Florinef) tablet 0.1 mg, 0.1 mg, nasogastric tube, q12h, RAQUEL Alejo, 0.1 mg at 04/03/24 0102    [Held by provider] heparin 25,000 Units in dextrose 5% 250 mL (100 Units/mL) infusion (premix), 0-4,000 Units/hr, intravenous, Continuous, RAQUEL Alejo, Stopped at 04/03/24 0425    [MAR Hold] hydrocortisone sod  succ (PF) (Solu-CORTEF) injection 50 mg, 50 mg, intravenous, q6h, Gracie Skaggs, PHILLIP-CNP, 50 mg at 04/04/24 0938    [MAR Hold] insulin glargine (Lantus) injection 10 Units, 10 Units, subcutaneous, q12h, PHILLIP Alejo-CNP, 10 Units at 04/04/24 0852    [MAR Hold] insulin lispro (HumaLOG) injection 0-10 Units, 0-10 Units, subcutaneous, q4h, PHILLIP Alejo-CNP, 6 Units at 04/04/24 0852    lactated Ringer's infusion, 5 mL/hr, intravenous, Continuous, PHILLIP Alejo-CNP, Last Rate: 5 mL/hr at 04/04/24 0800, 5 mL/hr at 04/04/24 0800    [MAR Hold] magnesium sulfate IV 2 g, 2 g, intravenous, q6h PRN, PHILLIP Alejo-CNP, Stopped at 03/24/24 1726    [MAR Hold] magnesium sulfate IV 4 g, 4 g, intravenous, q6h PRN, PHILLIP Alejo-CNP, Stopped at 03/21/24 0706    [MAR Hold] meropenem (Merrem) 2 g in sodium chloride 0.9 % 100 mL IV, 2 g, intravenous, q8h, Henry Harvey MD, Stopped at 04/04/24 0923    [MAR Hold] micafungin (Mycamine) 100 mg in dextrose 5 % in water (D5W) 100 mL IV, 100 mg, intravenous, q24h, Henry Harvey MD, Stopped at 04/04/24 0600    [MAR Hold] midodrine (Proamatine) tablet 20 mg, 20 mg, orogastric tube, q8h, PHILLIP Alejo-CNP, 20 mg at 04/03/24 1003    norepinephrine (Levophed) 8 mg in dextrose 5% 250 mL (0.032 mg/mL) infusion (premix), 0.01-0.2 mcg/kg/min (Dosing Weight), intravenous, Continuous, PHILLIP Alejo-CNP, Last Rate: 15.15 mL/hr at 04/04/24 1349, 0.08 mcg/kg/min at 04/04/24 1349    oxygen (O2) therapy, , inhalation, Continuous PRN - O2/gases, RAQUEL Alejo, 70 percent at 04/04/24 0752    [MAR Hold] pantoprazole (ProtoNix) injection 40 mg, 40 mg, intravenous, BID, Henry Harvey MD, 40 mg at 04/04/24 0852    [Held by provider] piperacillin-tazobactam-dextrose (Zosyn) IV 3.375 g, 3.375 g, intravenous, q6h, RAQUEL Alejo, Stopped at 04/03/24 2308    propofol (Diprivan)  infusion, 5-50 mcg/kg/min, intravenous, Continuous, PHILLIP Alejo-CNP, Last Rate: 18.75 mL/hr at 04/04/24 1321, 25 mcg/kg/min at 04/04/24 1321    [MAR Hold] vancomycin (Vancocin) pharmacy to dose - pharmacy monitoring, , miscellaneous, Daily PRN, Remy Peter APRN-CNP      Assessment/Plan   Jason Hollins is a 63 y.o. 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 Hawkins and Dr. Smith on 3/5/24. On 3/20, he developed massive PE and was given TPA, taken to OR in setting of increased swelling at flap site- hematoma for exploration and evacuation. Nephrology following for RUTH.    Acute Kidney Injury on Dialysis (RUTH-D), anuric  - etiology hemodynamic from hemorrhagic shock  - began on CVVH since 3/21 via R I J trialysis  - Cr baseline 0.6  - electrolytes: stable  - anuric; bladder scan with just 9mL; 24h I/Os net - 2.4L  - hemodynamics: on levo 0.08  - FiO2 50% on vent - trach  - stopped CVVH 4/2 as hemodynamically stable  - s/p SLED 4/2 with 3L fluid removed  - SLED did not occur 4/3-4/4 as planned as pt deemed too unstable    Myxoid Chondrosarcoma s/p wide resection 3/5/2024  Thrombocytopenia  - s/p IVIG on 3/31 as per Oncology    Plan  - will re-order SLED for today as not done yesterday. Hold off on CVVH  - bladder scan once per shift to monitor for renal recovery  - daily BMP - make sure labs checked >4h after end of SLED so that it is accurate due to electrolyte shift    D/w Dr. Yolanda Duffy MD  Nephrology Fellow PGY 5  Contact via secure chat  Nephrology Pager # [phone] ([phone])

## 2024-04-04 NOTE — OP NOTE
OPERATIVE NOTE     Date:  2024 OR Location: [facility] OR    Name: Jason Hollins : 1960, Age: 63 y.o., MRN: [id number], Sex: male      Surgeons   Phoenix Wolfe MD    Resident/Fellow/Other Assistant:  Maribel Yusuf MD    Anesthesia: General  ASA: III  Anesthesia Staff: Anesthesiologist: Zohra Bass MD  CRNA: ZOHREH Krishnamurthy  Staff: Circulator: Jemma Holly RN; Abbey Silva, ROMERO; Meena Smith, ROMERO  Scrub Person: Guerita Clemente; Katelyn Mitesh      Preoperative Diagnosis:  Tracheal bleeding     Postoperative Diagnosis:  Same    Procedure:  Direct laryngoscopy  Flexible bronchoscopy  Flexible esophagoscopy  Exploration of neck/trach site for bleeding    Findings:  Clot removed from the oropharynx , larynx.  Source appeared to be trickling from the thyroid bed down in to the airway and then up the glottis.  Original 6 distal XLT trach was backwalled, exchanged to a 6 proximal XLT  Clot removed from bilateral mainstem bronchi  Old blood suctioned out of the stomach.  Some inflamed tissue around the gastroesophageal junction but no active bleeding.    Estimated Blood Loss:  10 mL    Implantables/Drains:  none    Complications:  None    Description of Procedure:  This patient is a 62 yo man who is sp percutaneous tracheostomy due to prolonged ventilation. He has been having consistent oozing. Initially from the trach site but after that was packed, began to bleed through the mouth. We decided to take to the OR for exploration. Patient also had a platelet count of 48 and was given a platelet transfusion.    The patient was brought directly into the OR. A preoperative huddle was performed, confirming the correct patient, procedure, laterality, and allergies. The patient was induced under general anesthesia.    We began by suctioning out all the clot from the oropharynx and larynx. Using the Dedo, we performed a laryngoscopy and it initally .appeared that there was some pooling of blood coming  from the esophagus so we used a flexible esophagoscope to examine the entire esophagus. Quite a bit of old blood was suctioned out of the stomach. The GE junction was inflamed but no active bleeding as seen. The upper esophagus also did not appear to be bleeding, but blood was spilling out of the glottis. We then looked down the tracheostomy with  flexible bronchoscope and noted clot in bilateral mainstem. We removed this with a suction catheter. The tracheostomy was also facing the posterior tracheal wall. We then decided to remove the tracheostomy and intubated the patient from above with a 6.5 ET tube. We then examined the tracheostomy tract and some oozing was noticed from the left thyroid bed. This was cauterized with a bipolar.     We then pulled the ET tube back and put a 6 proximal XLT shiley in with the aid of a bronchoscope. This was noted to be in good position within the airway. The tube was secured with 2-0 silk sutures and a tracheostomy tie. Patient was then transferred back to the ICU.    I was present for the critical portions of the procedure.

## 2024-04-04 NOTE — PROGRESS NOTES
Jason Hollins is a 63 y.o. male on day 30 of admission presenting with Soft tissue sarcoma (CMS/HCC).    Subjective   Still with bloody oral secretions and julio c-tracheal area.    Taken back to OR today with ENT s/p esophagoscopy and bronchoscopy, trach revision. Found extensive clot burden in esophagus and stomach as well as in the lungs.        Objective     Physical Exam  Vitals reviewed.   Constitutional:       Appearance: He is ill-appearing.      Comments: Trached.   HENT:      Head:      Comments: Edematous face/head/neck     Nose:      Comments: Dobhoff in R nare bridled in place at 65cm.     Mouth/Throat:      Mouth: Mucous membranes are dry.   Eyes:      General: Scleral icterus present.      Pupils: Pupils are equal, round, and reactive to light.      Comments: Conjunctival edema. Subconjunctival hemorrhage. Pupils pinpoint   Neck:      Comments: Trach midline. 6-0 proximal Shiley XLT, dressing with bloody drainage. RIJ trialysis line in place, dressing c/d/i.   Cardiovascular:      Rate and Rhythm: Normal rate and regular rhythm.      Pulses:           Radial pulses are 1+ on the right side and 1+ on the left side.        Dorsalis pedis pulses are 1+ on the right side and 1+ on the left side.      Heart sounds: Normal heart sounds.   Pulmonary:      Comments: Mechanically ventilated via trach.  Abdominal:      General: Abdomen is protuberant. Bowel sounds are normal.      Palpations: Abdomen is soft.      Comments: Obese abdomen.   Genitourinary:     Comments: Penile and scrotal edema. Dignicare in place with liquid brown stool.  Musculoskeletal:      Cervical back: Neck supple.   Skin:     General: Skin is warm.      Coloration: Skin is jaundiced.      Comments: Anasarca. Right flap site with wound VAC, no output. ANDERSON x 3 all with serosanguinous output.   Neurological:      GCS: GCS eye subscore is 1. GCS verbal subscore is 1. GCS motor subscore is 1.      Comments: Trached. Sedated.     Psychiatric:       "Comments: HANNAH.     Last Recorded Vitals  Blood pressure (!) 126/49, pulse 91, temperature 37.4 °C (99.3 °F), temperature source Temporal, resp. rate 20, height 1.778 m (5' 10\"), weight 125 kg (275 lb 5.7 oz), SpO2 97 %.    VS over last 24h  Heart Rate:  []   Temp:  [36.5 °C (97.7 °F)-39.1 °C (102.4 °F)]   Resp:  [13-33]   BP: (115-155)/(48-59)   SpO2:  [87 %-99 %]        Intake/Output Summary (Last 24 hours) at 4/4/2024 0839  Last data filed at 4/4/2024 0800  Gross per 24 hour   Intake 2524.86 ml   Output 690 ml   Net 1834.86 ml        Scheduled medications  fludrocortisone, 0.1 mg, nasogastric tube, q12h  hydrocortisone sodium succinate, 50 mg, intravenous, q6h  insulin glargine, 10 Units, subcutaneous, q12h  insulin lispro, 0-10 Units, subcutaneous, q4h  meropenem, 2 g, intravenous, q8h  micafungin, 100 mg, intravenous, q24h  midodrine, 20 mg, orogastric tube, q8h  pantoprazole, 40 mg, intravenous, BID  [Held by provider] piperacillin-tazobactam, 3.375 g, intravenous, q6h  vasopressin, , ,       Continuous medications  [Held by provider] heparin, 0-4,000 Units/hr, Last Rate: Stopped (04/03/24 0425)  lactated Ringer's, 5 mL/hr, Last Rate: 5 mL/hr (04/04/24 0800)  norepinephrine, 0.01-0.2 mcg/kg/min (Dosing Weight), Last Rate: 0.1 mcg/kg/min (04/04/24 0800)  propofol, 5-50 mcg/kg/min, Last Rate: 25 mcg/kg/min (04/04/24 0800)      PRN medications  PRN medications: alteplase, calcium gluconate, calcium gluconate, dextrose **OR** glucagon, magnesium sulfate, magnesium sulfate, oxygen, vancomycin, vasopressin      Results for orders placed or performed during the hospital encounter of 03/05/24 (from the past 24 hour(s))   POCT GLUCOSE   Result Value Ref Range    POCT Glucose 142 (H) 74 - 99 mg/dL   POCT GLUCOSE   Result Value Ref Range    POCT Glucose 174 (H) 74 - 99 mg/dL   Blood Gas Arterial Full Panel   Result Value Ref Range    POCT pH, Arterial 7.11 (LL) 7.38 - 7.42 pH    POCT pCO2, Arterial 80 (HH) 38 - 42 " mm Hg    POCT pO2, Arterial 83 (L) 85 - 95 mm Hg    POCT SO2, Arterial 96 94 - 100 %    POCT Oxy Hemoglobin, Arterial 93.6 (L) 94.0 - 98.0 %    POCT Hematocrit Calculated, Arterial 23.0 (L) 41.0 - 52.0 %    POCT Sodium, Arterial 133 (L) 136 - 145 mmol/L    POCT Potassium, Arterial 4.2 3.5 - 5.3 mmol/L    POCT Chloride, Arterial 102 98 - 107 mmol/L    POCT Ionized Calcium, Arterial 1.11 1.10 - 1.33 mmol/L    POCT Glucose, Arterial 187 (H) 74 - 99 mg/dL    POCT Lactate, Arterial 1.9 0.4 - 2.0 mmol/L    POCT Base Excess, Arterial -4.3 (L) -2.0 - 3.0 mmol/L    POCT HCO3 Calculated, Arterial 25.4 22.0 - 26.0 mmol/L    POCT Hemoglobin, Arterial 7.7 (L) 13.5 - 17.5 g/dL    POCT Anion Gap, Arterial 10 10 - 25 mmo/L    Patient Temperature 37.0 degrees Celsius    FiO2 100 %   Blood Gas Arterial Full Panel   Result Value Ref Range    POCT pH, Arterial 7.28 (L) 7.38 - 7.42 pH    POCT pCO2, Arterial 49 (H) 38 - 42 mm Hg    POCT pO2, Arterial 321 (H) 85 - 95 mm Hg    POCT SO2, Arterial 100 94 - 100 %    POCT Oxy Hemoglobin, Arterial 97.4 94.0 - 98.0 %    POCT Hematocrit Calculated, Arterial 23.0 (L) 41.0 - 52.0 %    POCT Sodium, Arterial 132 (L) 136 - 145 mmol/L    POCT Potassium, Arterial 4.2 3.5 - 5.3 mmol/L    POCT Chloride, Arterial 102 98 - 107 mmol/L    POCT Ionized Calcium, Arterial 1.09 (L) 1.10 - 1.33 mmol/L    POCT Glucose, Arterial 178 (H) 74 - 99 mg/dL    POCT Lactate, Arterial 1.9 0.4 - 2.0 mmol/L    POCT Base Excess, Arterial -3.6 (L) -2.0 - 3.0 mmol/L    POCT HCO3 Calculated, Arterial 23.0 22.0 - 26.0 mmol/L    POCT Hemoglobin, Arterial 7.7 (L) 13.5 - 17.5 g/dL    POCT Anion Gap, Arterial 11 10 - 25 mmo/L    Patient Temperature 37.0 degrees Celsius    FiO2 100 %   Prepare Platelets: 1 Units   Result Value Ref Range    PRODUCT CODE [id number]     Unit Number [id number]     Unit ABO O     Unit RH POS     Dispense Status TR     Blood Expiration Date April 04, 2024 23:59 EDT     PRODUCT BLOOD TYPE 5100     UNIT  VOLUME 342    POCT GLUCOSE   Result Value Ref Range    POCT Glucose 199 (H) 74 - 99 mg/dL   CALCIUM, IONIZED   Result Value Ref Range    POCT Calcium, Ionized 1.03 (L) 1.1 - 1.33 mmol/L   CBC   Result Value Ref Range    WBC 3.1 (L) 4.4 - 11.3 x10*3/uL    nRBC 2.6 (H) 0.0 - 0.0 /100 WBCs    RBC 2.52 (L) 4.50 - 5.90 x10*6/uL    Hemoglobin 7.3 (L) 13.5 - 17.5 g/dL    Hematocrit 22.1 (L) 41.0 - 52.0 %    MCV 88 80 - 100 fL    MCH 29.0 26.0 - 34.0 pg    MCHC 33.0 32.0 - 36.0 g/dL    RDW 21.8 (H) 11.5 - 14.5 %    Platelets 59 (L) 150 - 450 x10*3/uL   Coagulation Screen   Result Value Ref Range    Protime 28.1 (H) 9.8 - 12.8 seconds    INR 2.5 (H) 0.9 - 1.1    aPTT 40 (H) 27 - 38 seconds   Renal Function Panel   Result Value Ref Range    Glucose 201 (H) 74 - 99 mg/dL    Sodium 134 (L) 136 - 145 mmol/L    Potassium 4.6 3.5 - 5.3 mmol/L    Chloride 96 (L) 98 - 107 mmol/L    Bicarbonate 25 21 - 32 mmol/L    Anion Gap 18 10 - 20 mmol/L    Urea Nitrogen 39 (H) 6 - 23 mg/dL    Creatinine 1.76 (H) 0.50 - 1.30 mg/dL    eGFR 43 (L) >60 mL/min/1.73m*2    Calcium 8.0 (L) 8.6 - 10.6 mg/dL    Phosphorus 3.7 2.5 - 4.9 mg/dL    Albumin 3.3 (L) 3.4 - 5.0 g/dL   Magnesium   Result Value Ref Range    Magnesium 2.08 1.60 - 2.40 mg/dL   Lactate   Result Value Ref Range    Lactate 2.0 0.4 - 2.0 mmol/L   Blood Culture    Specimen: Peripheral Venipuncture; Blood culture   Result Value Ref Range    Blood Culture       Identification and susceptibility testing to follow    Gram Stain Gram negative bacilli (AA)    Blood Culture    Specimen: Peripheral Venipuncture; Blood culture   Result Value Ref Range    Blood Culture       Identification and susceptibility testing to follow    Gram Stain Gram negative bacilli (AA)    Blood Gas Arterial Full Panel   Result Value Ref Range    POCT pH, Arterial 7.15 (LL) 7.38 - 7.42 pH    POCT pCO2, Arterial 75 (HH) 38 - 42 mm Hg    POCT pO2, Arterial 253 (H) 85 - 95 mm Hg    POCT SO2, Arterial 99 94 - 100 %     POCT Oxy Hemoglobin, Arterial 96.7 94.0 - 98.0 %    POCT Hematocrit Calculated, Arterial 25.0 (L) 41.0 - 52.0 %    POCT Sodium, Arterial 130 (L) 136 - 145 mmol/L    POCT Potassium, Arterial 5.3 3.5 - 5.3 mmol/L    POCT Chloride, Arterial 97 (L) 98 - 107 mmol/L    POCT Ionized Calcium, Arterial 1.16 1.10 - 1.33 mmol/L    POCT Glucose, Arterial 238 (H) 74 - 99 mg/dL    POCT Lactate, Arterial 2.6 (H) 0.4 - 2.0 mmol/L    POCT Base Excess, Arterial -3.2 (L) -2.0 - 3.0 mmol/L    POCT HCO3 Calculated, Arterial 26.1 (H) 22.0 - 26.0 mmol/L    POCT Hemoglobin, Arterial 8.4 (L) 13.5 - 17.5 g/dL    POCT Anion Gap, Arterial 12 10 - 25 mmo/L    Patient Temperature 37.0 degrees Celsius    FiO2 100 %   CBC   Result Value Ref Range    WBC 4.6 4.4 - 11.3 x10*3/uL    nRBC 2.6 (H) 0.0 - 0.0 /100 WBCs    RBC 2.67 (L) 4.50 - 5.90 x10*6/uL    Hemoglobin 7.9 (L) 13.5 - 17.5 g/dL    Hematocrit 23.4 (L) 41.0 - 52.0 %    MCV 88 80 - 100 fL    MCH 29.6 26.0 - 34.0 pg    MCHC 33.8 32.0 - 36.0 g/dL    RDW 21.2 (H) 11.5 - 14.5 %    Platelets 58 (L) 150 - 450 x10*3/uL   POCT GLUCOSE   Result Value Ref Range    POCT Glucose 229 (H) 74 - 99 mg/dL   Prepare RBC: 1 Units   Result Value Ref Range    PRODUCT CODE [id number]     Unit Number [id number]     Unit ABO O     Unit RH POS     XM INTEP COMP     Dispense Status IS     Blood Expiration Date April 11, 2024 23:59 EDT     PRODUCT BLOOD TYPE 5100     UNIT VOLUME 350    Blood Gas Arterial Full Panel   Result Value Ref Range    POCT pH, Arterial 7.27 (L) 7.38 - 7.42 pH    POCT pCO2, Arterial 53 (H) 38 - 42 mm Hg    POCT pO2, Arterial 77 (L) 85 - 95 mm Hg    POCT SO2, Arterial 97 94 - 100 %    POCT Oxy Hemoglobin, Arterial 94.2 94.0 - 98.0 %    POCT Hematocrit Calculated, Arterial 24.0 (L) 41.0 - 52.0 %    POCT Sodium, Arterial 129 (L) 136 - 145 mmol/L    POCT Potassium, Arterial 5.3 3.5 - 5.3 mmol/L    POCT Chloride, Arterial 96 (L) 98 - 107 mmol/L    POCT Ionized Calcium, Arterial 1.12 1.10 - 1.33  mmol/L    POCT Glucose, Arterial 242 (H) 74 - 99 mg/dL    POCT Lactate, Arterial 2.9 (H) 0.4 - 2.0 mmol/L    POCT Base Excess, Arterial -2.7 (L) -2.0 - 3.0 mmol/L    POCT HCO3 Calculated, Arterial 24.3 22.0 - 26.0 mmol/L    POCT Hemoglobin, Arterial 8.0 (L) 13.5 - 17.5 g/dL    POCT Anion Gap, Arterial 14 10 - 25 mmo/L    Patient Temperature 37.0 degrees Celsius    FiO2 100 %   Blood Gas Arterial Full Panel   Result Value Ref Range    POCT pH, Arterial 7.31 (L) 7.38 - 7.42 pH    POCT pCO2, Arterial 45 (H) 38 - 42 mm Hg    POCT pO2, Arterial 137 (H) 85 - 95 mm Hg    POCT SO2, Arterial 100 94 - 100 %    POCT Oxy Hemoglobin, Arterial 96.9 94.0 - 98.0 %    POCT Hematocrit Calculated, Arterial 27.0 (L) 41.0 - 52.0 %    POCT Sodium, Arterial 128 (L) 136 - 145 mmol/L    POCT Potassium, Arterial 5.2 3.5 - 5.3 mmol/L    POCT Chloride, Arterial 95 (L) 98 - 107 mmol/L    POCT Ionized Calcium, Arterial 1.06 (L) 1.10 - 1.33 mmol/L    POCT Glucose, Arterial 229 (H) 74 - 99 mg/dL    POCT Lactate, Arterial 2.9 (H) 0.4 - 2.0 mmol/L    POCT Base Excess, Arterial -3.4 (L) -2.0 - 3.0 mmol/L    POCT HCO3 Calculated, Arterial 22.7 22.0 - 26.0 mmol/L    POCT Hemoglobin, Arterial 8.9 (L) 13.5 - 17.5 g/dL    POCT Anion Gap, Arterial 16 10 - 25 mmo/L    Patient Temperature 37.0 degrees Celsius    FiO2 100 %   CALCIUM, IONIZED   Result Value Ref Range    POCT Calcium, Ionized 1.04 (L) 1.1 - 1.33 mmol/L   CBC   Result Value Ref Range    WBC 4.8 4.4 - 11.3 x10*3/uL    nRBC 1.9 (H) 0.0 - 0.0 /100 WBCs    RBC 2.81 (L) 4.50 - 5.90 x10*6/uL    Hemoglobin 8.2 (L) 13.5 - 17.5 g/dL    Hematocrit 24.7 (L) 41.0 - 52.0 %    MCV 88 80 - 100 fL    MCH 29.2 26.0 - 34.0 pg    MCHC 33.2 32.0 - 36.0 g/dL    RDW 20.4 (H) 11.5 - 14.5 %    Platelets 57 (L) 150 - 450 x10*3/uL   Coagulation Screen   Result Value Ref Range    Protime 17.5 (H) 9.8 - 12.8 seconds    INR 1.5 (H) 0.9 - 1.1    aPTT 40 (H) 27 - 38 seconds   Hepatic function panel   Result Value Ref Range     Albumin 3.4 3.4 - 5.0 g/dL    Bilirubin, Total 28.1 (H) 0.0 - 1.2 mg/dL    Bilirubin, Direct 19.8 (H) 0.0 - 0.3 mg/dL    Alkaline Phosphatase 193 (H) 33 - 136 U/L     (H) 10 - 52 U/L     (H) 9 - 39 U/L    Total Protein 5.2 (L) 6.4 - 8.2 g/dL   Magnesium   Result Value Ref Range    Magnesium 2.13 1.60 - 2.40 mg/dL   Basic Metabolic Panel   Result Value Ref Range    Glucose 243 (H) 74 - 99 mg/dL    Sodium 132 (L) 136 - 145 mmol/L    Potassium 5.2 3.5 - 5.3 mmol/L    Chloride 93 (L) 98 - 107 mmol/L    Bicarbonate 23 21 - 32 mmol/L    Anion Gap 21 (H) 10 - 20 mmol/L    Urea Nitrogen 46 (H) 6 - 23 mg/dL    Creatinine 2.08 (H) 0.50 - 1.30 mg/dL    eGFR 35 (L) >60 mL/min/1.73m*2    Calcium 8.2 (L) 8.6 - 10.6 mg/dL   Phosphorus   Result Value Ref Range    Phosphorus 4.9 2.5 - 4.9 mg/dL   POCT GLUCOSE   Result Value Ref Range    POCT Glucose 245 (H) 74 - 99 mg/dL   Blood Gas Arterial Full Panel   Result Value Ref Range    POCT pH, Arterial 7.23 (LL) 7.38 - 7.42 pH    POCT pCO2, Arterial 58 (H) 38 - 42 mm Hg    POCT pO2, Arterial 55 (L) 85 - 95 mm Hg    POCT SO2, Arterial 88 (L) 94 - 100 %    POCT Oxy Hemoglobin, Arterial 85.2 (L) 94.0 - 98.0 %    POCT Hematocrit Calculated, Arterial 25.0 (L) 41.0 - 52.0 %    POCT Sodium, Arterial 128 (L) 136 - 145 mmol/L    POCT Potassium, Arterial 5.4 (H) 3.5 - 5.3 mmol/L    POCT Chloride, Arterial 96 (L) 98 - 107 mmol/L    POCT Ionized Calcium, Arterial 1.10 1.10 - 1.33 mmol/L    POCT Glucose, Arterial 241 (H) 74 - 99 mg/dL    POCT Lactate, Arterial 2.9 (H) 0.4 - 2.0 mmol/L    POCT Base Excess, Arterial -3.4 (L) -2.0 - 3.0 mmol/L    POCT HCO3 Calculated, Arterial 24.3 22.0 - 26.0 mmol/L    POCT Hemoglobin, Arterial 8.4 (L) 13.5 - 17.5 g/dL    POCT Anion Gap, Arterial 13 10 - 25 mmo/L    Patient Temperature 37.0 degrees Celsius    FiO2 100 %   Blood Gas Arterial Full Panel   Result Value Ref Range    POCT pH, Arterial 7.32 (L) 7.38 - 7.42 pH    POCT pCO2, Arterial 31 (L) 38  - 42 mm Hg    POCT pO2, Arterial 140 (H) 85 - 95 mm Hg    POCT SO2, Arterial 100 94 - 100 %    POCT Oxy Hemoglobin, Arterial 97.1 94.0 - 98.0 %    POCT Hematocrit Calculated, Arterial 18.0 (L) 41.0 - 52.0 %    POCT Sodium, Arterial 135 (L) 136 - 145 mmol/L    POCT Potassium, Arterial 3.5 3.5 - 5.3 mmol/L    POCT Chloride, Arterial 110 (H) 98 - 107 mmol/L    POCT Ionized Calcium, Arterial 0.83 (L) 1.10 - 1.33 mmol/L    POCT Glucose, Arterial 167 (H) 74 - 99 mg/dL    POCT Lactate, Arterial 1.7 0.4 - 2.0 mmol/L    POCT Base Excess, Arterial -9.3 (L) -2.0 - 3.0 mmol/L    POCT HCO3 Calculated, Arterial 16.0 (L) 22.0 - 26.0 mmol/L    POCT Hemoglobin, Arterial 6.1 (LL) 13.5 - 17.5 g/dL    POCT Anion Gap, Arterial 13 10 - 25 mmo/L    Patient Temperature 37.0 degrees Celsius    FiO2 100 %   POCT GLUCOSE   Result Value Ref Range    POCT Glucose 179 (H) 74 - 99 mg/dL   Prepare RBC: 2 Units   Result Value Ref Range    PRODUCT CODE [id number]     Unit Number [id number]     Unit ABO O     Unit RH POS     XM INTEP COMP     Dispense Status IS     Blood Expiration Date April 29, 2024 23:59 EDT     PRODUCT BLOOD TYPE 5100     UNIT VOLUME 350     PRODUCT CODE [id number]     Unit Number [id number]     Unit ABO O     Unit RH POS     XM INTEP COMP     Dispense Status IS     Blood Expiration Date May 01, 2024 23:59 EDT     PRODUCT BLOOD TYPE 5100     UNIT VOLUME 285    Prepare Plasma: 1 Units   Result Value Ref Range    PRODUCT CODE [id number]     Unit Number [id number]     Unit ABO B     Unit RH POS     Dispense Status IS     Blood Expiration Date April 08, 2024 05:32 EDT     PRODUCT BLOOD TYPE 7300     UNIT VOLUME 294    Prepare Platelets: 1 Units   Result Value Ref Range    PRODUCT CODE [id number]     Unit Number [id number]     Unit ABO A     Unit RH POS     Dispense Status IS     Blood Expiration Date April 04, 2024 23:59 EDT     PRODUCT BLOOD TYPE 6200     UNIT VOLUME 330    CBC   Result Value Ref Range    WBC 4.6 4.4 -  11.3 x10*3/uL    nRBC 1.1 (H) 0.0 - 0.0 /100 WBCs    RBC 2.56 (L) 4.50 - 5.90 x10*6/uL    Hemoglobin 7.6 (L) 13.5 - 17.5 g/dL    Hematocrit 22.7 (L) 41.0 - 52.0 %    MCV 89 80 - 100 fL    MCH 29.7 26.0 - 34.0 pg    MCHC 33.5 32.0 - 36.0 g/dL    RDW 20.6 (H) 11.5 - 14.5 %    Platelets 51 (L) 150 - 450 x10*3/uL   TEG Clot Global Profile   Result Value Ref Range    R (Reaction Time) K 12.4 (H) 4.6 - 9.1 min    K (Clot Kinetics) 1.8 0.8 - 2.1 min    ANGLE 70.0 63.0 - 78.0 deg    MA (Max Amplitude) K 56.0 52.0 - 69.0 mm    R (Reaction Time) KH 9.7 (H) 4.3 - 8.3 min    MA (Max Amplitude) RT 55.0 52.0 - 70.0 mm    MA ( Ramiro Amplitude) FF 21.0 15.0 - 32.0 mm    FLEV 383 278 - 581 mg/dL   CBC   Result Value Ref Range    WBC 2.9 (L) 4.4 - 11.3 x10*3/uL    nRBC 1.7 (H) 0.0 - 0.0 /100 WBCs    RBC 2.65 (L) 4.50 - 5.90 x10*6/uL    Hemoglobin 7.9 (L) 13.5 - 17.5 g/dL    Hematocrit 23.3 (L) 41.0 - 52.0 %    MCV 88 80 - 100 fL    MCH 29.8 26.0 - 34.0 pg    MCHC 33.9 32.0 - 36.0 g/dL    RDW 19.9 (H) 11.5 - 14.5 %    Platelets 48 (L) 150 - 450 x10*3/uL   Blood Gas Arterial Full Panel   Result Value Ref Range    POCT pH, Arterial 7.32 (L) 7.38 - 7.42 pH    POCT pCO2, Arterial 45 (H) 38 - 42 mm Hg    POCT pO2, Arterial 88 85 - 95 mm Hg    POCT SO2, Arterial 99 94 - 100 %    POCT Oxy Hemoglobin, Arterial 96.2 94.0 - 98.0 %    POCT Hematocrit Calculated, Arterial 25.0 (L) 41.0 - 52.0 %    POCT Sodium, Arterial 130 (L) 136 - 145 mmol/L    POCT Potassium, Arterial 5.2 3.5 - 5.3 mmol/L    POCT Chloride, Arterial 96 (L) 98 - 107 mmol/L    POCT Ionized Calcium, Arterial 1.06 (L) 1.10 - 1.33 mmol/L    POCT Glucose, Arterial 267 (H) 74 - 99 mg/dL    POCT Lactate, Arterial 2.8 (H) 0.4 - 2.0 mmol/L    POCT Base Excess, Arterial -2.8 (L) -2.0 - 3.0 mmol/L    POCT HCO3 Calculated, Arterial 23.2 22.0 - 26.0 mmol/L    POCT Hemoglobin, Arterial 8.3 (L) 13.5 - 17.5 g/dL    POCT Anion Gap, Arterial 16 10 - 25 mmo/L    Patient Temperature 37.0 degrees  Celsius    FiO2 80 %        Assessment/Plan   Jason Hollins is a 63 y.o. 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 Hawkins and Dr. Smith on 3/5/24.  Post operative course was uncomplicated and patient was on RNF until 3/20 for prolonged bed rest to avoid hip flexion to maintain flap integrity.  Patient was on prophylactic lovenox while on bedrest.     3/20: Cardiopulmonary arrest s/p CPR with ROSC after TPA, overnight started on heparin, epo, increased pressor requirements, increased swelling at flap site.     3/21: Hemorrhagic shock, MTP. Firm and large right flap site. Return to OR s/p Exploration of right thigh wound, Evacuation of hematoma. Suture ligation of multiple bleeding vessel and several points in gluteal area.     4/1: s/p Trach    4/4: return OR with ENT s/p esophagoscopy and bronchoscopy, trach revision. Found extensive clot burden in esophagus and stomach as well as in the lungs.    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 Hawkins and Dr. Smith on 3/5/24 s/p cardiopulmonary arrest with ROSC 3/20. CT head 3/22 without acute process. Weaned off cisatracurium and propofol overnight 3/23-3/24. Ketamine weaned off 3/25 and Fentanyl weaned off 3/26 am. Repeat CT Head 3/26 without acute process. MRI Brain 3/30, negative for cerebral infarction or hemorrhage. Neuro exam - sedated, no movement this am  - ongoing neuro and pain assessments  - continue propofol, titrate to RASS goal-4 to -5 in the setting of worsened clinical status  - NMB reversed on return to ICU  - PT/OT -> PROM  - no need for soft wrist restraints  at this time -> continue to reassess     CV: History of HTN, HLD. Acute cardiopulmonary arrest 2/2 to possible massive PE vs massive STEMI, received multiple rounds of CPR and one defibrillation.  Sustained ROSC achieved after TPA bolus. On high dose levophed, epinephrine, vaso, angioT2. Plan on 3/21 was for ECMO which was aborted secondary to large hemhorrge at right flap site. Had MTP and taken OR with 5L evacuated. ECHO 3/21: Normal LV, Mod enlarged RV, slight suggestion of a Townsend's sign / RV strain, low normal RV function. ECHO 3/22 with hyperdynamic LV. New onset Afib with RVR overnight 3/22-3/23, dosed with 150mg amio x2, started infusion at 1mg/min thereafter. AT2 weaned off. Amio infusion discontinued 3/25. Lactate downtrending. Vaso and epi weaned off. Remains in NSR. iEpo weaned off 3/27. Repeat TTE 3/29 and 4/2 essentially unchanged. Levo resumed 4/2 evening to continue aggressive fluid removal. Increased requirement overnight in the setting of bleeding, on levo 0.1mcg/kg/min at time of exam  - continuous EKG/abp/cvp monitoring  - Goal map range 65-90  - wean levophed as tolerated  - Continue midodrine 20mg q8h  - Continue florinef 0.1mg BID   - Continue SDS with hydrocortisone 50mg q12h -> increase frequency to q6h  - Holding heparin infusion for now given bleeding from trach site     PULM: Arrived to SICU intubated julio c-CPR. Concern for massive PE. Started on iEpo, currently on 0.05. Hypoxic respiratory failure. Severe ARDS. ETT exchanged 3/23. CT Chest 3/26 with interval JOHN consolidative/ground glass opacity. S/p Tracheostomy on 4/1. Currently on SIMV 40%, 550, 18, +10, 5 PS. PIP up to 41 with Vt ~250-300. RT able to suction out clot and PIP improved to 26 and Vt back to ~500. Bedside bronch -> some blood in trach, posterior wall tissue just distal to trach tip appears to be collapsing on cannula.   - nebulized TXA 500mg q8h x3 doses  - Wean FiO2 as tolerated  - ABGs prn  - additional pulm toilet  prn  - daily CXR    ENT: Had epistaxis 3/23, completed afrin bid x3 days. S/p trach on 4/1. Bleeding from trach site 4/2 am, packing placed by ENT. Repeat episode of bloody tracheal secretions 4/3 through this am. Taken back to OR with ENT as per above.  - TXA as above and holding heparin for now     GI: NPO. Shock liver, pancreatitis. Elevated TG. Elevated lactate. Consulted ACS for potential bowel ischemia as cause of persistent lactic acidosis. CT imaging 3/22 with evidence of pancreatitis. No acute surgical indication per ACS. RUQ US 3/22 with thickening of GB wall without cholelithiasis. CT A/P 3/26 negative for acute bleed. LFTs downtrending. Worsening hyperbilirubinemia. Amylase and lipase now WNL. Repeat RUQ US 4/1 with nonspecific gallbladder wall edema and thickening which may be 2/2 generalized fluid overload, mild hepatomegaly with slight nodular contour and c/f steatosis and fibrosis, irregular hypoechoic region adjacent to hepatic veins. US liver with doppler 4/2 revealed hepatomegaly with nodular contour, mildly elevated RI in main and left hepatic arteries.  - Hepatology following, appreciate recs  - hold TFs for now  - prostat daily  - PPI for GI ppx  - Trend LFTs daily     : No history of renal disease. Baseline creatinine 0.6. Anuric RUTH. Elevated CK, downtrending. Metabolic acidosis, total body fluid overload, hyperkalemia. Started on CVVH 3/21, stopped bicarb infusion 3/22. Marino removed 3/24. Hyponatremia resolved. Stopped CVVH per Nephrology on 4/2. Net +[id number] over last 24h.  - Nephrology following, appreciate recs -> recommending SLED  - RFP BID and PRN  - Replete electrolytes judiciously  - Daily bladder scan -> 6ml today     HEME: Patient was on ppx lovenox for DVT prophylaxis prior to cardiopulmonary arrest. Concern for massive PE patient received bolus of TPA during CPR. Started on heparin infusion overnight given concern for PE. Hemorrhagic shock 3/21, MTP and back to OR s/p Exploration  of right thigh woundEvacuation of hematoma. Suture ligation of multiple bleeding vessel and several points in gluteal area. Hematology consulted 3/22 to r/o HLH. Transfused 1 RBC overnight 3/22-3/23. Thrombocytopenia, coagulapathy, hypofibrinogenemia. LE Duplex 3/25 +acute occlusive R soleal DVT. Received 2u PRBC and 1u FFP on 3/26. Heparin infusion discontinued 3/26 to r/o HIT and transitioned to bival. PF4 negative, resumed heparin infuision 3/27. Elevated IL2R and decreased NK function. C/f HLH (low suspicion), empirically treated with decadron (3/28-3/31). Thrombocytopenia on 3/30 to 18k, received 5pk, did not increment. Heparin held. Thrombocytopenia likely 2/2 to consumptive process, hematology following. Received IVIG and 5pk plts 3/31. Plt count improved to 68k, now down to 44k this am. Worsening coagulopathy with INR up to 2.5. [id number] FFP, 5pk Plts, gave Vitamin K 10mg IV x1, TXA nebs as above during the day 4/3. Overnight transfused 2rbc, 1ffp, 5pk plt.   - cbc, coags bid and prn  - fibrinogen daily  - holding heparin infusion  - Hematology following, appreciate recs -> maintain Plt count >35k  - ongoing monitoring for s/s bleeding  - close surveillance of RLE and drains  - Vasc Med signed off 3/27  - maintain active T&S (4/3)    ENDO: History of DM2. A1c 7.5%. TSH WNL 1/2024. Hyperglycemia  - continue lantus 10u q12h  - q4h accuchecks and SSI #2 -> increase to #4     ID: Leukocytosis resolved, now with leukopenia. On broad antimicrobial therapy. MRSA screen + 2/28/24. Pan cultures sent 3/22. Sputum NTF, +MRSA screen (completed 5 day course mupirocin), UCx and BCx negative. Completed 7 day course vanc/zosyn 3/27. Febrile to 39.1 4/3, resent blood cultures and BAL and started vanco and zosyn. Blood cultures with GNB. Switched zosyn to reynaldo, kept on vanco, added john  - temp q4h, wbc daily  - F/U blood culture results  - repeat blood cultures   - F/U BAL  - resume vanc and zosyn  - ongoing monitoring  for s/s infection  - new central access when more stable clinically    Lines:  - right brachial arterial line (3/20)  - RIJ trialysis (3/27)  - left subclavian MAC with mini MAC (3/20)  - PIV x1     Dispo: Continue ICU care. Patient seen and discussed with ICU attending PHILLIP Mckenna-CNP  SICU phone [phone]    Critical Care time: 60 minutes

## 2024-04-04 NOTE — ANESTHESIA PROCEDURE NOTES
Airway  Date/Time: 4/4/2024 11:29 AM  Urgency: emergent    Difficult airway: trach in situ.    Staffing  Performed: attending and CRNA   Authorized by: Zohra Bass MD    Performed by: PHILLIP Krishnamurthy-BALTAZAR  Patient location during procedure: OR    Consent for Airway (if performed for an anesthetic, see related documentation for consents)  Consent: Verbal consent not obtained.  Emergent situation: consented with family.Consent given by: spouse      Indications and Patient Condition  Indications for airway management: anesthesia and airway protection  Spontaneous ventilation: present  Sedation level: deep  Mask difficulty assessment: 0 - not attempted  No planned trial extubation    Final Airway Details  Final airway type: surgical airway (trach shiley 6.o distal cuffed in situ)          Additional Comments  Trach in situ, needs trach revision.

## 2024-04-04 NOTE — PROGRESS NOTES
"Vancomycin Dosing by Pharmacy- FOLLOW UP    Jason Hollins is a 63 y.o. year old male who Pharmacy has been consulted for vancomycin dosing for pneumonia. Based on the patient's indication and renal status this patient is being dosed based on a goal trough/random level of 15-20.     Patient is on SLED, last session on 4/2. Next SLED planned for 4/4.    Current vancomycin dose: 2000 mg given 4/3 @1718.     Most recent trough level: 12.3 mcg/mL (20hr level)    Visit Vitals  /53   Pulse 80   Temp 36.6 °C (97.9 °F) (Temporal)   Resp 24        Lab Results   Component Value Date    CREATININE 2.44 (H) 04/04/2024    CREATININE 2.60 (H) 04/04/2024    CREATININE 2.08 (H) 04/04/2024    CREATININE 1.76 (H) 04/03/2024        Patient weight is No results found for: \"PTWEIGHT\"    No results found for: \"CULTURE\"     I/O last 3 completed shifts:  In: 3464.5 (27.7 mL/kg) [I.V.:1240.5 (9.9 mL/kg); Blood:649; NG/GT:675; IV Piggyback:900]  Out: 4365 (34.9 mL/kg) [Emesis/NG output:300; Drains:165; Other:3000; Stool:900]  Weight: 124.9 kg   [unfilled]    Lab Results   Component Value Date    PATIENTTEMP 37.0 04/04/2024    PATIENTTEMP 37.0 04/04/2024    PATIENTTEMP 37.0 04/04/2024        Assessment/Plan    Below goal random/trough level. Orders placed for new vancomycin regimen of 1750 mg x 1, scheduled 4/4 @1800  The next level will be obtained on 4/5 4 hours post SLED (will be ordered tomorrow). May be obtained sooner if clinically indicated.   Will continue to monitor renal function daily while on vancomycin and order serum creatinine at least every 48 hours if not already ordered.  Follow for continued vancomycin needs, clinical response, and signs/symptoms of toxicity.       Debbie Pena, PharmD           "

## 2024-04-05 ENCOUNTER — APPOINTMENT (OUTPATIENT)
Dept: RADIOLOGY | Facility: HOSPITAL | Age: 64
DRG: 003 | End: 2024-04-05
Payer: COMMERCIAL

## 2024-04-05 LAB
ALBUMIN SERPL BCP-MCNC: 2.3 G/DL (ref 3.4–5)
ALBUMIN SERPL BCP-MCNC: 2.3 G/DL (ref 3.4–5)
ALBUMIN SERPL BCP-MCNC: 2.7 G/DL (ref 3.4–5)
ALBUMIN SERPL BCP-MCNC: 2.8 G/DL (ref 3.4–5)
ALBUMIN SERPL BCP-MCNC: 2.9 G/DL (ref 3.4–5)
ALP SERPL-CCNC: 118 U/L (ref 33–136)
ALP SERPL-CCNC: 175 U/L (ref 33–136)
ALP SERPL-CCNC: 187 U/L (ref 33–136)
ALT SERPL W P-5'-P-CCNC: 61 U/L (ref 10–52)
ALT SERPL W P-5'-P-CCNC: 79 U/L (ref 10–52)
ALT SERPL W P-5'-P-CCNC: 81 U/L (ref 10–52)
ANION GAP BLDA CALCULATED.4IONS-SCNC: 14 MMO/L (ref 10–25)
ANION GAP BLDA CALCULATED.4IONS-SCNC: 9 MMO/L (ref 10–25)
ANION GAP SERPL CALC-SCNC: 12 MMOL/L (ref 10–20)
ANION GAP SERPL CALC-SCNC: 15 MMOL/L (ref 10–20)
ANION GAP SERPL CALC-SCNC: 15 MMOL/L (ref 10–20)
ANION GAP SERPL CALC-SCNC: 18 MMOL/L (ref 10–20)
APTT PPP: 36 SECONDS (ref 27–38)
AST SERPL W P-5'-P-CCNC: 125 U/L (ref 9–39)
AST SERPL W P-5'-P-CCNC: 139 U/L (ref 9–39)
AST SERPL W P-5'-P-CCNC: 94 U/L (ref 9–39)
BASE EXCESS BLDA CALC-SCNC: -1.4 MMOL/L (ref -2–3)
BASE EXCESS BLDA CALC-SCNC: 1.8 MMOL/L (ref -2–3)
BASOPHILS # BLD AUTO: 0 X10*3/UL (ref 0–0.1)
BASOPHILS NFR BLD AUTO: 0 %
BILIRUB DIRECT SERPL-MCNC: 13.5 MG/DL (ref 0–0.3)
BILIRUB DIRECT SERPL-MCNC: 17.7 MG/DL (ref 0–0.3)
BILIRUB DIRECT SERPL-MCNC: 18 MG/DL (ref 0–0.3)
BILIRUB SERPL-MCNC: 19.2 MG/DL (ref 0–1.2)
BILIRUB SERPL-MCNC: 24.6 MG/DL (ref 0–1.2)
BILIRUB SERPL-MCNC: 26 MG/DL (ref 0–1.2)
BLOOD EXPIRATION DATE: NORMAL
BODY TEMPERATURE: 37 DEGREES CELSIUS
BODY TEMPERATURE: 37 DEGREES CELSIUS
BUN SERPL-MCNC: 16 MG/DL (ref 6–23)
BUN SERPL-MCNC: 21 MG/DL (ref 6–23)
BUN SERPL-MCNC: 24 MG/DL (ref 6–23)
BUN SERPL-MCNC: 34 MG/DL (ref 6–23)
CA-I BLD-SCNC: 0.94 MMOL/L (ref 1.1–1.33)
CA-I BLD-SCNC: 1 MMOL/L (ref 1.1–1.33)
CA-I BLD-SCNC: 1.06 MMOL/L (ref 1.1–1.33)
CA-I BLD-SCNC: 1.09 MMOL/L (ref 1.1–1.33)
CA-I BLDA-SCNC: 1.09 MMOL/L (ref 1.1–1.33)
CA-I BLDA-SCNC: 1.11 MMOL/L (ref 1.1–1.33)
CALCIUM SERPL-MCNC: 6.3 MG/DL (ref 8.6–10.6)
CALCIUM SERPL-MCNC: 7.3 MG/DL (ref 8.6–10.6)
CALCIUM SERPL-MCNC: 7.8 MG/DL (ref 8.6–10.6)
CALCIUM SERPL-MCNC: 8 MG/DL (ref 8.6–10.6)
CHLORIDE BLDA-SCNC: 97 MMOL/L (ref 98–107)
CHLORIDE BLDA-SCNC: 98 MMOL/L (ref 98–107)
CHLORIDE SERPL-SCNC: 101 MMOL/L (ref 98–107)
CHLORIDE SERPL-SCNC: 106 MMOL/L (ref 98–107)
CHLORIDE SERPL-SCNC: 96 MMOL/L (ref 98–107)
CHLORIDE SERPL-SCNC: 96 MMOL/L (ref 98–107)
CO2 SERPL-SCNC: 21 MMOL/L (ref 21–32)
CO2 SERPL-SCNC: 23 MMOL/L (ref 21–32)
CO2 SERPL-SCNC: 23 MMOL/L (ref 21–32)
CO2 SERPL-SCNC: 25 MMOL/L (ref 21–32)
CREAT SERPL-MCNC: 0.82 MG/DL (ref 0.5–1.3)
CREAT SERPL-MCNC: 0.98 MG/DL (ref 0.5–1.3)
CREAT SERPL-MCNC: 1.14 MG/DL (ref 0.5–1.3)
CREAT SERPL-MCNC: 1.44 MG/DL (ref 0.5–1.3)
DISPENSE STATUS: NORMAL
EGFRCR SERPLBLD CKD-EPI 2021: 55 ML/MIN/1.73M*2
EGFRCR SERPLBLD CKD-EPI 2021: 73 ML/MIN/1.73M*2
EGFRCR SERPLBLD CKD-EPI 2021: 87 ML/MIN/1.73M*2
EGFRCR SERPLBLD CKD-EPI 2021: >90 ML/MIN/1.73M*2
EOSINOPHIL # BLD AUTO: 0 X10*3/UL (ref 0–0.7)
EOSINOPHIL NFR BLD AUTO: 0 %
ERYTHROCYTE [DISTWIDTH] IN BLOOD BY AUTOMATED COUNT: 18.9 % (ref 11.5–14.5)
ERYTHROCYTE [DISTWIDTH] IN BLOOD BY AUTOMATED COUNT: 19 % (ref 11.5–14.5)
ERYTHROCYTE [DISTWIDTH] IN BLOOD BY AUTOMATED COUNT: 19.3 % (ref 11.5–14.5)
ERYTHROCYTE [DISTWIDTH] IN BLOOD BY AUTOMATED COUNT: 19.3 % (ref 11.5–14.5)
ERYTHROCYTE [DISTWIDTH] IN BLOOD BY AUTOMATED COUNT: 19.5 % (ref 11.5–14.5)
GLUCOSE BLD MANUAL STRIP-MCNC: 117 MG/DL (ref 74–99)
GLUCOSE BLD MANUAL STRIP-MCNC: 131 MG/DL (ref 74–99)
GLUCOSE BLD MANUAL STRIP-MCNC: 137 MG/DL (ref 74–99)
GLUCOSE BLD MANUAL STRIP-MCNC: 148 MG/DL (ref 74–99)
GLUCOSE BLD MANUAL STRIP-MCNC: 165 MG/DL (ref 74–99)
GLUCOSE BLD MANUAL STRIP-MCNC: 166 MG/DL (ref 74–99)
GLUCOSE BLDA-MCNC: 124 MG/DL (ref 74–99)
GLUCOSE BLDA-MCNC: 180 MG/DL (ref 74–99)
GLUCOSE SERPL-MCNC: 105 MG/DL (ref 74–99)
GLUCOSE SERPL-MCNC: 120 MG/DL (ref 74–99)
GLUCOSE SERPL-MCNC: 132 MG/DL (ref 74–99)
GLUCOSE SERPL-MCNC: 193 MG/DL (ref 74–99)
HCO3 BLDA-SCNC: 22.7 MMOL/L (ref 22–26)
HCO3 BLDA-SCNC: 25.6 MMOL/L (ref 22–26)
HCT VFR BLD AUTO: 20.4 % (ref 41–52)
HCT VFR BLD AUTO: 20.8 % (ref 41–52)
HCT VFR BLD AUTO: 21.8 % (ref 41–52)
HCT VFR BLD AUTO: 27.5 % (ref 41–52)
HCT VFR BLD AUTO: 27.7 % (ref 41–52)
HCT VFR BLD EST: 26 % (ref 41–52)
HCT VFR BLD EST: 29 % (ref 41–52)
HGB BLD-MCNC: 7 G/DL (ref 13.5–17.5)
HGB BLD-MCNC: 7 G/DL (ref 13.5–17.5)
HGB BLD-MCNC: 7.6 G/DL (ref 13.5–17.5)
HGB BLD-MCNC: 9.7 G/DL (ref 13.5–17.5)
HGB BLD-MCNC: 9.9 G/DL (ref 13.5–17.5)
HGB BLDA-MCNC: 8.8 G/DL (ref 13.5–17.5)
HGB BLDA-MCNC: 9.8 G/DL (ref 13.5–17.5)
IMM GRANULOCYTES # BLD AUTO: 0.03 X10*3/UL (ref 0–0.7)
IMM GRANULOCYTES NFR BLD AUTO: 5.4 % (ref 0–0.9)
INHALED O2 CONCENTRATION: 50 %
INHALED O2 CONCENTRATION: 50 %
INR PPP: 1.2 (ref 0.9–1.1)
LACTATE BLDA-SCNC: 1.3 MMOL/L (ref 0.4–2)
LACTATE BLDA-SCNC: 1.4 MMOL/L (ref 0.4–2)
LYMPHOCYTES # BLD AUTO: 0.08 X10*3/UL (ref 1.2–4.8)
LYMPHOCYTES NFR BLD AUTO: 14.3 %
MAGNESIUM SERPL-MCNC: 1.67 MG/DL (ref 1.6–2.4)
MAGNESIUM SERPL-MCNC: 1.68 MG/DL (ref 1.6–2.4)
MAGNESIUM SERPL-MCNC: 2.45 MG/DL (ref 1.6–2.4)
MAGNESIUM SERPL-MCNC: 2.49 MG/DL (ref 1.6–2.4)
MCH RBC QN AUTO: 28.3 PG (ref 26–34)
MCH RBC QN AUTO: 28.9 PG (ref 26–34)
MCH RBC QN AUTO: 29.3 PG (ref 26–34)
MCH RBC QN AUTO: 29.4 PG (ref 26–34)
MCH RBC QN AUTO: 30 PG (ref 26–34)
MCHC RBC AUTO-ENTMCNC: 33.7 G/DL (ref 32–36)
MCHC RBC AUTO-ENTMCNC: 34.3 G/DL (ref 32–36)
MCHC RBC AUTO-ENTMCNC: 34.9 G/DL (ref 32–36)
MCHC RBC AUTO-ENTMCNC: 35 G/DL (ref 32–36)
MCHC RBC AUTO-ENTMCNC: 36 G/DL (ref 32–36)
MCV RBC AUTO: 83 FL (ref 80–100)
MCV RBC AUTO: 84 FL (ref 80–100)
MONOCYTES # BLD AUTO: 0.1 X10*3/UL (ref 0.1–1)
MONOCYTES NFR BLD AUTO: 17.9 %
NEUTROPHILS # BLD AUTO: 0.35 X10*3/UL (ref 1.2–7.7)
NEUTROPHILS NFR BLD AUTO: 62.4 %
NRBC BLD-RTO: 0 /100 WBCS (ref 0–0)
NRBC BLD-RTO: 1 /100 WBCS (ref 0–0)
NRBC BLD-RTO: 1.4 /100 WBCS (ref 0–0)
NRBC BLD-RTO: 6.5 /100 WBCS (ref 0–0)
NRBC BLD-RTO: 7.1 /100 WBCS (ref 0–0)
OXYHGB MFR BLDA: 96.6 % (ref 94–98)
OXYHGB MFR BLDA: 96.6 % (ref 94–98)
PCO2 BLDA: 35 MM HG (ref 38–42)
PCO2 BLDA: 36 MM HG (ref 38–42)
PH BLDA: 7.42 PH (ref 7.38–7.42)
PH BLDA: 7.46 PH (ref 7.38–7.42)
PHOSPHATE SERPL-MCNC: 1.2 MG/DL (ref 2.5–4.9)
PHOSPHATE SERPL-MCNC: 1.4 MG/DL (ref 2.5–4.9)
PHOSPHATE SERPL-MCNC: 2.1 MG/DL (ref 2.5–4.9)
PHOSPHATE SERPL-MCNC: 3 MG/DL (ref 2.5–4.9)
PLATELET # BLD AUTO: 43 X10*3/UL (ref 150–450)
PLATELET # BLD AUTO: 45 X10*3/UL (ref 150–450)
PLATELET # BLD AUTO: 56 X10*3/UL (ref 150–450)
PLATELET # BLD AUTO: 57 X10*3/UL (ref 150–450)
PLATELET # BLD AUTO: 64 X10*3/UL (ref 150–450)
PO2 BLDA: 102 MM HG (ref 85–95)
PO2 BLDA: 105 MM HG (ref 85–95)
POTASSIUM BLDA-SCNC: 2.9 MMOL/L (ref 3.5–5.3)
POTASSIUM BLDA-SCNC: 3.4 MMOL/L (ref 3.5–5.3)
POTASSIUM SERPL-SCNC: 2.9 MMOL/L (ref 3.5–5.3)
POTASSIUM SERPL-SCNC: 3 MMOL/L (ref 3.5–5.3)
POTASSIUM SERPL-SCNC: 3.6 MMOL/L (ref 3.5–5.3)
POTASSIUM SERPL-SCNC: 3.7 MMOL/L (ref 3.5–5.3)
PRODUCT BLOOD TYPE: 5100
PRODUCT BLOOD TYPE: 6200
PRODUCT BLOOD TYPE: 7300
PRODUCT BLOOD TYPE: 8400
PRODUCT CODE: NORMAL
PROT SERPL-MCNC: 3.8 G/DL (ref 6.4–8.2)
PROT SERPL-MCNC: 4.7 G/DL (ref 6.4–8.2)
PROT SERPL-MCNC: 4.9 G/DL (ref 6.4–8.2)
PROTHROMBIN TIME: 13.4 SECONDS (ref 9.8–12.8)
RBC # BLD AUTO: 2.42 X10*6/UL (ref 4.5–5.9)
RBC # BLD AUTO: 2.47 X10*6/UL (ref 4.5–5.9)
RBC # BLD AUTO: 2.59 X10*6/UL (ref 4.5–5.9)
RBC # BLD AUTO: 3.3 X10*6/UL (ref 4.5–5.9)
RBC # BLD AUTO: 3.3 X10*6/UL (ref 4.5–5.9)
SAO2 % BLDA: 100 % (ref 94–100)
SAO2 % BLDA: 99 % (ref 94–100)
SODIUM BLDA-SCNC: 130 MMOL/L (ref 136–145)
SODIUM BLDA-SCNC: 130 MMOL/L (ref 136–145)
SODIUM SERPL-SCNC: 132 MMOL/L (ref 136–145)
SODIUM SERPL-SCNC: 133 MMOL/L (ref 136–145)
SODIUM SERPL-SCNC: 136 MMOL/L (ref 136–145)
SODIUM SERPL-SCNC: 136 MMOL/L (ref 136–145)
UNIT ABO: NORMAL
UNIT NUMBER: NORMAL
UNIT RH: NORMAL
UNIT VOLUME: 254
UNIT VOLUME: 285
UNIT VOLUME: 294
UNIT VOLUME: 300
UNIT VOLUME: 330
UNIT VOLUME: 350
UNIT VOLUME: 350
VANCOMYCIN SERPL-MCNC: 13.8 UG/ML (ref 5–20)
WBC # BLD AUTO: 0.4 X10*3/UL (ref 4.4–11.3)
WBC # BLD AUTO: 0.6 X10*3/UL (ref 4.4–11.3)
WBC # BLD AUTO: 2 X10*3/UL (ref 4.4–11.3)
WBC # BLD AUTO: 2.8 X10*3/UL (ref 4.4–11.3)
WBC # BLD AUTO: 2.9 X10*3/UL (ref 4.4–11.3)
XM INTEP: NORMAL

## 2024-04-05 PROCEDURE — P9040 RBC LEUKOREDUCED IRRADIATED: HCPCS

## 2024-04-05 PROCEDURE — 2500000004 HC RX 250 GENERAL PHARMACY W/ HCPCS (ALT 636 FOR OP/ED): Performed by: STUDENT IN AN ORGANIZED HEALTH CARE EDUCATION/TRAINING PROGRAM

## 2024-04-05 PROCEDURE — 2500000004 HC RX 250 GENERAL PHARMACY W/ HCPCS (ALT 636 FOR OP/ED): Mod: JZ | Performed by: NURSE PRACTITIONER

## 2024-04-05 PROCEDURE — 85027 COMPLETE CBC AUTOMATED: CPT

## 2024-04-05 PROCEDURE — 36430 TRANSFUSION BLD/BLD COMPNT: CPT

## 2024-04-05 PROCEDURE — 80053 COMPREHEN METABOLIC PANEL: CPT

## 2024-04-05 PROCEDURE — 83735 ASSAY OF MAGNESIUM: CPT

## 2024-04-05 PROCEDURE — 99232 SBSQ HOSP IP/OBS MODERATE 35: CPT | Performed by: PHYSICIAN ASSISTANT

## 2024-04-05 PROCEDURE — 82330 ASSAY OF CALCIUM: CPT | Performed by: STUDENT IN AN ORGANIZED HEALTH CARE EDUCATION/TRAINING PROGRAM

## 2024-04-05 PROCEDURE — P9073 PLATELETS PHERESIS PATH REDU: HCPCS

## 2024-04-05 PROCEDURE — 85610 PROTHROMBIN TIME: CPT

## 2024-04-05 PROCEDURE — 2500000002 HC RX 250 W HCPCS SELF ADMINISTERED DRUGS (ALT 637 FOR MEDICARE OP, ALT 636 FOR OP/ED): Performed by: NURSE PRACTITIONER

## 2024-04-05 PROCEDURE — 82040 ASSAY OF SERUM ALBUMIN: CPT | Performed by: NURSE PRACTITIONER

## 2024-04-05 PROCEDURE — C9113 INJ PANTOPRAZOLE SODIUM, VIA: HCPCS | Performed by: NURSE PRACTITIONER

## 2024-04-05 PROCEDURE — 71045 X-RAY EXAM CHEST 1 VIEW: CPT

## 2024-04-05 PROCEDURE — 99223 1ST HOSP IP/OBS HIGH 75: CPT | Performed by: STUDENT IN AN ORGANIZED HEALTH CARE EDUCATION/TRAINING PROGRAM

## 2024-04-05 PROCEDURE — 99233 SBSQ HOSP IP/OBS HIGH 50: CPT | Performed by: STUDENT IN AN ORGANIZED HEALTH CARE EDUCATION/TRAINING PROGRAM

## 2024-04-05 PROCEDURE — 2500000004 HC RX 250 GENERAL PHARMACY W/ HCPCS (ALT 636 FOR OP/ED): Performed by: NURSE PRACTITIONER

## 2024-04-05 PROCEDURE — 82040 ASSAY OF SERUM ALBUMIN: CPT

## 2024-04-05 PROCEDURE — 85025 COMPLETE CBC W/AUTO DIFF WBC: CPT

## 2024-04-05 PROCEDURE — 99291 CRITICAL CARE FIRST HOUR: CPT | Performed by: STUDENT IN AN ORGANIZED HEALTH CARE EDUCATION/TRAINING PROGRAM

## 2024-04-05 PROCEDURE — 84132 ASSAY OF SERUM POTASSIUM: CPT

## 2024-04-05 PROCEDURE — 2500000005 HC RX 250 GENERAL PHARMACY W/O HCPCS: Performed by: NURSE PRACTITIONER

## 2024-04-05 PROCEDURE — 37799 UNLISTED PX VASCULAR SURGERY: CPT | Performed by: STUDENT IN AN ORGANIZED HEALTH CARE EDUCATION/TRAINING PROGRAM

## 2024-04-05 PROCEDURE — 2500000004 HC RX 250 GENERAL PHARMACY W/ HCPCS (ALT 636 FOR OP/ED)

## 2024-04-05 PROCEDURE — 99291 CRITICAL CARE FIRST HOUR: CPT | Performed by: NURSE PRACTITIONER

## 2024-04-05 PROCEDURE — 2020000001 HC ICU ROOM DAILY

## 2024-04-05 PROCEDURE — 2500000001 HC RX 250 WO HCPCS SELF ADMINISTERED DRUGS (ALT 637 FOR MEDICARE OP): Performed by: NURSE PRACTITIONER

## 2024-04-05 PROCEDURE — 84100 ASSAY OF PHOSPHORUS: CPT

## 2024-04-05 PROCEDURE — 2500000001 HC RX 250 WO HCPCS SELF ADMINISTERED DRUGS (ALT 637 FOR MEDICARE OP): Performed by: STUDENT IN AN ORGANIZED HEALTH CARE EDUCATION/TRAINING PROGRAM

## 2024-04-05 PROCEDURE — 80202 ASSAY OF VANCOMYCIN: CPT | Performed by: NURSE PRACTITIONER

## 2024-04-05 PROCEDURE — 71045 X-RAY EXAM CHEST 1 VIEW: CPT | Performed by: RADIOLOGY

## 2024-04-05 PROCEDURE — 82947 ASSAY GLUCOSE BLOOD QUANT: CPT

## 2024-04-05 PROCEDURE — A4217 STERILE WATER/SALINE, 500 ML: HCPCS

## 2024-04-05 PROCEDURE — 94003 VENT MGMT INPAT SUBQ DAY: CPT

## 2024-04-05 RX ORDER — POTASSIUM CHLORIDE 29.8 MG/ML
40 INJECTION INTRAVENOUS ONCE
Status: COMPLETED | OUTPATIENT
Start: 2024-04-05 | End: 2024-04-05

## 2024-04-05 RX ORDER — POTASSIUM CHLORIDE 1.5 G/1.58G
20 POWDER, FOR SOLUTION ORAL ONCE
Status: COMPLETED | OUTPATIENT
Start: 2024-04-05 | End: 2024-04-05

## 2024-04-05 RX ORDER — LIDOCAINE HCL/PF 100 MG/5ML
SYRINGE (ML) INTRAVENOUS
Status: DISPENSED
Start: 2024-04-05 | End: 2024-04-06

## 2024-04-05 RX ADMIN — INSULIN LISPRO 4 UNITS: 100 INJECTION, SOLUTION INTRAVENOUS; SUBCUTANEOUS at 20:16

## 2024-04-05 RX ADMIN — PANTOPRAZOLE SODIUM 40 MG: 40 INJECTION, POWDER, FOR SOLUTION INTRAVENOUS at 20:02

## 2024-04-05 RX ADMIN — PANTOPRAZOLE SODIUM 40 MG: 40 INJECTION, POWDER, FOR SOLUTION INTRAVENOUS at 08:56

## 2024-04-05 RX ADMIN — MIDODRINE HYDROCHLORIDE 20 MG: 10 TABLET ORAL at 01:18

## 2024-04-05 RX ADMIN — HYDROCORTISONE SODIUM SUCCINATE 50 MG: 100 INJECTION, POWDER, FOR SOLUTION INTRAMUSCULAR; INTRAVENOUS at 03:29

## 2024-04-05 RX ADMIN — POTASSIUM CHLORIDE 20 MEQ: 1.5 POWDER, FOR SOLUTION ORAL at 02:33

## 2024-04-05 RX ADMIN — MEROPENEM 1 G: 1 INJECTION, POWDER, FOR SOLUTION INTRAVENOUS at 10:28

## 2024-04-05 RX ADMIN — POTASSIUM CHLORIDE 40 MEQ: 29.8 INJECTION, SOLUTION INTRAVENOUS at 06:00

## 2024-04-05 RX ADMIN — FLUDROCORTISONE ACETATE 0.1 MG: 0.1 TABLET ORAL at 01:19

## 2024-04-05 RX ADMIN — MEROPENEM 1 G: 1 INJECTION, POWDER, FOR SOLUTION INTRAVENOUS at 20:17

## 2024-04-05 RX ADMIN — INSULIN GLARGINE 10 UNITS: 100 INJECTION, SOLUTION SUBCUTANEOUS at 08:55

## 2024-04-05 RX ADMIN — MAGNESIUM SULFATE HEPTAHYDRATE 4 G: 40 INJECTION, SOLUTION INTRAVENOUS at 04:38

## 2024-04-05 RX ADMIN — HYDROCORTISONE SODIUM SUCCINATE 50 MG: 100 INJECTION, POWDER, FOR SOLUTION INTRAMUSCULAR; INTRAVENOUS at 11:00

## 2024-04-05 RX ADMIN — CALCIUM GLUCONATE 1 G: 20 INJECTION, SOLUTION INTRAVENOUS at 02:29

## 2024-04-05 RX ADMIN — HYDROCORTISONE SODIUM SUCCINATE 50 MG: 100 INJECTION, POWDER, FOR SOLUTION INTRAMUSCULAR; INTRAVENOUS at 20:01

## 2024-04-05 RX ADMIN — INSULIN LISPRO 4 UNITS: 100 INJECTION, SOLUTION INTRAVENOUS; SUBCUTANEOUS at 16:30

## 2024-04-05 RX ADMIN — MIDODRINE HYDROCHLORIDE 20 MG: 10 TABLET ORAL at 17:44

## 2024-04-05 RX ADMIN — MIDODRINE HYDROCHLORIDE 20 MG: 10 TABLET ORAL at 10:28

## 2024-04-05 RX ADMIN — FLUDROCORTISONE ACETATE 0.1 MG: 0.1 TABLET ORAL at 12:59

## 2024-04-05 RX ADMIN — MICAFUNGIN SODIUM 100 MG: 100 INJECTION, POWDER, LYOPHILIZED, FOR SOLUTION INTRAVENOUS at 03:29

## 2024-04-05 RX ADMIN — VANCOMYCIN HYDROCHLORIDE 2000 MG: 5 INJECTION, POWDER, LYOPHILIZED, FOR SOLUTION INTRAVENOUS at 12:05

## 2024-04-05 RX ADMIN — PROPOFOL 15 MCG/KG/MIN: 10 INJECTION, EMULSION INTRAVENOUS at 05:43

## 2024-04-05 RX ADMIN — CALCIUM GLUCONATE 1 G: 20 INJECTION, SOLUTION INTRAVENOUS at 06:30

## 2024-04-05 RX ADMIN — INSULIN GLARGINE 10 UNITS: 100 INJECTION, SOLUTION SUBCUTANEOUS at 20:17

## 2024-04-05 RX ADMIN — SODIUM PHOSPHATE, MONOBASIC, MONOHYDRATE AND SODIUM PHOSPHATE, DIBASIC, ANHYDROUS 30 MMOL: 142; 276 INJECTION, SOLUTION INTRAVENOUS at 08:46

## 2024-04-05 ASSESSMENT — PAIN - FUNCTIONAL ASSESSMENT

## 2024-04-05 NOTE — PROGRESS NOTES
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 Initial postop course uncomplicated until 3/20 when patient experienced a cardiac arrested likely 2/2 PE. TPA administered. Course further complicated by hemorrhagic shock 3/21 necessitating operative intervention at the site of the flap for control of bleeding. Acute respiratory failure s/p trach 4/1, with additional OR course 4/4 for bleeding at [facility] site.    N: Off sedation, following commands. Moving 3 of 4 extremities - no movement in RLE noted yet. Of note, s/p CT head, brain MRI with findings of no acute processes - obtained in the setting of instability necessitating sedation +/- NMB as well as c/f encephalopathy (resolved).  CV: H/O HTN, HLD. Cardiac arrest 3/20 presumed 2/2 PE - holding heparin infusion given bleeding (most recently 4/4 at [facility]). Echo with low normal RV function. Previously requiring vasoactive infusions for hypotension/shock - now 0.01 NE rounds today; wean off if able. Previously started on stress dose steroids when treating shock - currently Q8H dosing. Continue midodrine, continue florinef.  P: Intubated julio c-arrest for acute hypoxic respiratory failure, airway protection - now s/p trach 4/1. ARDS. Klebsiella PNA. Currently CMV 50%, +10; wean as able.  : RUTH requiring dialysis, anuric. Hypervolemia. SLED 4/4-4/5. Plan for SLED tonight.  GI: Dx this admission with shock liver, pancreatitis - both now improved. Hyperbilirubinemia improving/downtrending, continue to monitor for resolution. Currently TF at goal. PPI.  Endo: H/O DM2, hyperglycemia - lantus, SSI.  Heme: Acute blood loss anemia 2/2 hemorrhagic shock 3/21. Thrombocytopenia extensively evaluated - HIT and HLH ruled out. R soleal DVT. PE as above. Heparin held given recent bleeding.   ID: BCx and Sputum CX with Klebsiella - continue reynaldo, john, vanc - discussed with ID.   Plastics: Discoloration of native tissue at R posterior hip -  c/f necrosis, per plastics plan to debride 4/9 if stable.    I updated the patient's nieces at bedside today - one niece is paramedic and helping relay medical information to the patient's wife.    I have reviewed and evaluated the most recent data and results, personally examined the patient, and formulated the plan of care as presented above. This patient was critically ill and required continued critical care treatment. Teaching and any separately billable procedures are not included in the time calculation.    Billing Provider Critical Care Time: 41 minutes    Cher Spicer MD

## 2024-04-05 NOTE — PROGRESS NOTES
Department of Plastic and Reconstructive Surgery  Daily Progress Note    Patient Name: Jason Hollins  MRN: [id number]  Date:  04/05/24     Subjective   Remains critically ill in ICU, on low dose pressor support (0.01 Levo). Transfused 2u PRBCs overnight, HGB up to 9.7 this AM. Total Bilirubin with slight downtrend (26.1 -> 18.9). Incisional wound vac intact to posterior R thigh, without issue overnight. Pt family members at bedside, discussed potential return to OR early next week as clinical stability allows for debridement of darkened/discolored tissue at R posterior thigh with possible tissue advancement and closure.      Objective   Vitals:    04/05/24 0500   BP:    Pulse: 70   Resp: 25   Temp:    SpO2: 96%     Physical Exam   Constitutional: Sedated, lying in pressure relieving bed in ICU, appears critically ill.   ENMT: MMM, dobhoff in R nare.  Cardiovascular: RRR on telemetry monitor.  Respiratory/Thorax: Trach to vent.  Gastrointestinal: Abdomen soft, protuberant.   Genitourinary: SLED until weaned from pressors.   Musculoskeletal: Minimal purposeful movement appreciated, exam limited as pt sedated.   Extremities: Generalized pitting edema. Right thigh edematous, but soft and compressible, no bruising or tissue induration noted. Flap recipient site and adjacent anterior R thigh incisions appear stable. Incision and native tissue overlying R posterior thigh (previously noted with maceration and darkened discoloration) dressed with intact incisional wound vac dressing, maintaining low continuous suction at -75mmHg, no alarms for leak, obstruction or malfunction, expectantly no output in collecting canister. ANDERSON drain x 3 right upper leg with dark serous outputs.  Neurological: Sedated.  Skin: Edematous, juandice, warm.     Current Medications  Scheduled medications  fludrocortisone, 0.1 mg, nasogastric tube, q12h  hydrocortisone sodium succinate, 50 mg, intravenous, q6h  insulin glargine, 10 Units, subcutaneous,  q12h  insulin lispro, 0-20 Units, subcutaneous, q4h  meropenem, 1 g, intravenous, q12h  micafungin, 100 mg, intravenous, q24h  midodrine, 20 mg, orogastric tube, q8h  pantoprazole, 40 mg, intravenous, BID  [Held by provider] piperacillin-tazobactam, 3.375 g, intravenous, q6h  potassium chloride, 40 mEq, intravenous, Once    Continuous medications  lactated Ringer's, 5 mL/hr, Last Rate: 5 mL/hr (04/05/24 0500)  norepinephrine, 0.01-0.2 mcg/kg/min (Dosing Weight), Last Rate: Stopped (04/05/24 0230)  propofol, 5-50 mcg/kg/min, Last Rate: 15 mcg/kg/min (04/05/24 0543)    PRN medications  PRN medications: alteplase, calcium gluconate, calcium gluconate, dextrose **OR** glucagon, magnesium sulfate, magnesium sulfate, oxygen, sodium chloride, vancomycin     Assessment   Jason Hollins 63 y.o. 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 Hawkins and Dr. Smith on 3/5/24. Postoperative course c/b arrest requiring CPR with ROSC after TPA for assumed large PE on 3/20. Increased swelling at flap site appreciated overnight 3/20-3/21 and pt returned to OR for exploration of R flap site, evacuation of hematoma, suture ligation of multiple bleeding vessel sites in gluteal area and wound closure with Dr. Smith on 3/21. Pt has remained in CTICU for continued critical care evaluation and management postoperatively.     Plan/Recommendations  - Anticipate return to OR with plastic surgery tentatively on Tuesday 4/9 as clinical stability permits for R posterior thigh tissue I&D with possible tissue advancement and complex closure        ·   Appreciate clearance for return to OR per ICU care team   - Maintain incisional wound vac to R posterior thigh wound site per plastic surgery         ·  Settings: -75 mmHg low continuous suction        ·   Dressing to be maintained until tentative return to OR 4/9       ·  If unable to return to OR 4/9, will follow up timing of next dressing change/removal        ·  Monitor/record vac canister output [id number]       ·  Notify plastic surgery with any concerns of leak or obstruction   - Monitor right thigh for s/s of bleeding recurrence or worsening progression, has been stable on serial assessments over past week   - Continue ANDERSON drain care to x3 drains present at R thigh flap reconstruction site      ·  Strip drain tubing TID and PRN     ·  Monitor and record output q8h      ·  Keep drain sites C/D/I      ·  Notify plastics if ANDERSON drain output exceeds > 100 ml/hr in one drain or > 300 ml/hr collectively  - Avoid pressure application or positioning onto flap site or incisions at R upper leg   - Recommend patient be positioned off of R side as able to prevent flap compression/wound breakdown   - Continue clinitron fluidized air mattress  - Appreciate remaining supportive care per ICU   - Plastic Surgery will continue to follow     Patient and plan discussed with Dr. Smith.     Gracie Smith PA-C  Plastic and Reconstructive Surgery   Pager #[phone]  Team phones: [phone]

## 2024-04-05 NOTE — PROGRESS NOTES
Jasno Hollins is a 63 y.o. male on day 31 of admission presenting with Soft tissue sarcoma (CMS/HCC).      Subjective   4/5: S/p SLED overnight with 2L fluid removed - tolerated it well. Had trach exchanged yesterday due to excessive bleeding; FiO2 50% on vent. Still on some levophed. No urine output.  Bladder scan with just 25mL.       Objective          Vitals 24HR  Heart Rate:  [61-81]   Temp:  [36.2 °C (97.2 °F)-36.9 °C (98.4 °F)]   Resp:  [19-26]   BP: (117-135)/(45-55)   Weight:  [125 kg (275 lb 9.2 oz)]   SpO2:  [93 %-100 %]         Intake/Output last 3 Shifts:    Intake/Output Summary (Last 24 hours) at 4/5/2024 1526  Last data filed at 4/5/2024 1400  Gross per 24 hour   Intake 2795.02 ml   Output 2265 ml   Net 530.02 ml         Physical Exam  General appearance: intubated, trached  Eyes: non-icteric  Skin: no apparent rash  Heart: RRR  Lungs: good airflow throughout,FiO2 50% on vent via trach  Abdomen: soft, non-distended   Extremities: trace B/l leg edema  : no Marino  Neuro: alert  ACCESS:  right I J trialysis    Current Facility-Administered Medications:     alteplase (Cathflo Activase) injection 2 mg, 2 mg, intra-catheter, PRN, Gracie Lozadaic, APRN-CNP    calcium gluconate in NS IV 1 g, 1 g, intravenous, q6h PRN, Gracie C Uljanic, APRN-CNP, Stopped at 04/05/24 0700    calcium gluconate in NS IV 2 g, 2 g, intravenous, q6h PRN, Gracie DUNBAR Uljanic, APRN-CNP, Last Rate: 100 mL/hr at 04/04/24 1118, 2 g at 04/04/24 1118    dextrose 50 % injection 25 g, 25 g, intravenous, q15 min PRN **OR** glucagon (Glucagen) injection 1 mg, 1 mg, intramuscular, q15 min PRN, Gracie DUNBAR Uljanic, APRN-CNP    fludrocortisone (Florinef) tablet 0.1 mg, 0.1 mg, nasogastric tube, q12h, Gracei Skaggs APRN-CNP, 0.1 mg at 04/05/24 1259    hydrocortisone sod succ (PF) (Solu-CORTEF) injection 50 mg, 50 mg, intravenous, q8h, PHILLIP Bernal-CNP, 50 mg at 04/05/24 1100    insulin glargine (Lantus) injection 10  Units, 10 Units, subcutaneous, q12h, Gracie Skaggs APRN-CNP, 10 Units at 04/05/24 0855    insulin lispro (HumaLOG) injection 0-20 Units, 0-20 Units, subcutaneous, q4h, Gracie Skaggs APRN-CNP, 4 Units at 04/04/24 2029    magnesium sulfate IV 2 g, 2 g, intravenous, q6h PRN, Gracie Skaggs APRN-CNP, Stopped at 03/24/24 1726    magnesium sulfate IV 4 g, 4 g, intravenous, q6h PRN, Gracie Skaggs APRN-CNP, Stopped at 04/05/24 0838    meropenem (Merrem) in sodium chloride 0.9 % 100 mL IV 1 g, 1 g, intravenous, q12h, Danilo Torres MD, Stopped at 04/05/24 1058    micafungin (Mycamine) 100 mg in dextrose 5 % in water (D5W) 100 mL IV, 100 mg, intravenous, q24h, Gracie Skaggs APRN-CNP, Stopped at 04/05/24 0429    midodrine (Proamatine) tablet 20 mg, 20 mg, orogastric tube, q8h, PHILLIP Bernal-CNP, 20 mg at 04/05/24 1028    norepinephrine (Levophed) 8 mg in dextrose 5% 250 mL (0.032 mg/mL) infusion (premix), 0.01-0.2 mcg/kg/min (Dosing Weight), intravenous, Continuous, RAQUEL Alejo, Last Rate: 1.89 mL/hr at 04/05/24 1245, 0.01 mcg/kg/min at 04/05/24 1245    oxygen (O2) therapy, , inhalation, Continuous PRN - O2/gases, PHILLIP Alejo-CNP, Rate Verify at 04/05/24 1120    pantoprazole (ProtoNix) injection 40 mg, 40 mg, intravenous, BID, PHILLIP Bernal-CNP, 40 mg at 04/05/24 0856    sodium chloride (Ocean) 0.65 % nasal spray 1 spray, 1 spray, Each Nostril, 4x daily PRN, Osman Perkins MD    sodium phosphate 30 mmol in sodium chloride 0.9% 250 mL IV, 30 mmol, intravenous, Once, PHILLIP Bernal-CNP, Last Rate: 31.3 mL/hr at 04/05/24 0846, 30 mmol at 04/05/24 0846    vancomycin (Vancocin) pharmacy to dose - pharmacy monitoring, , miscellaneous, Daily PRN, PHILLIP Bernal-CNP      Assessment/Plan   Jason Hollins is a 63 y.o. male with PMH of DM2, HLD, myxoid chondrosarcoma s/p wide resection sarcoma, sciatic nerve neurolysis  of right lower extremity with right gluteal reconstruction, pedicle ALT, vastus lateralus flap, pedicle gluteal and perisformis flap with Dr. Hawkins and Dr. Smith on 3/5/24. On 3/20, he developed massive PE and was given TPA, taken to OR in setting of increased swelling at flap site- hematoma for exploration and evacuation. Nephrology following for RUTH.    Acute Kidney Injury on Dialysis (RUTH-D), anuric  - etiology hemodynamic from hemorrhagic shock  - began on CVVH since 3/21 via R I J trialysis  - Cr baseline 0.6  - electrolytes: stable  - anuric; bladder scan with just 25mL; 24h I/Os net - 2.4L  - hemodynamics: on levo 0.08 --> 0.01  - FiO2 50% on vent - trach  - stopped CVVH 4/2 as hemodynamically stable  - s/p SLED 4/2 with 3L fluid removed  - s/p SLED 4/4-4/5 with 2L UF    Myxoid Chondrosarcoma s/p wide resection 3/5/2024  Thrombocytopenia    Plan  - tolerated SLED well yesterday; plan for SLED again tonight - target 2L fluid removal  - bladder scan once per shift to monitor for renal recovery  - please do not draw BMP during SLED or within 4h after termination as electrolytes will be inaccurate due to electrolyte shifts; thus get BMP >4h after SLED ends    D/w Dr. Yolanda Duffy MD  Nephrology Fellow PGY 5  Contact via secure chat  Nephrology Pager # [phone] ([phone])

## 2024-04-05 NOTE — CONSULTS
Inpatient consult to Infectious Diseases  Consult performed by: Akil Shane MD  Consult ordered by: RAQUEL Bernal        Referred by RAQUEL Bernal     Primary MD: RAQUEL Gregory    Reason For Consult  Klebsiella aerogenes Bacteremia     History Of Present Illness  Jason Hollins is a 63 y.o. male with hx of type 2 DM.    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   He had extensive surgery on 3/5 including wide resection of gluteal sarcoma, sciatic nerve neurolysis and reconstruction with flap.   He developed cardiac arrest with PEA on 3/20 and had CPR for 45 minutes . He survived but ended up being intubated with 3 pressor. Acute kidney injury requiring CRRT,  shock liver and pancreatitis.   He received anticoagulation for pulmonary embolism when he was PEA.  He developed bleeding from the surgery site and had hematoma evacuation with vessel ligation on 3/21.   He was put on broad spectrum antibiotic with vancomycin and piperacillin-tazobactam for possible sepsis from 3/20 to 3/27.   His MRSA screen was positive but the rest of infectious work up was negative..  he developed thrombocytopenia and work up showed elevated ferritin, triglyceride, IL-2R consistent of possible HLH and steroid was initiated but thrombocytopenia persisted and steroid was stopped and received a dose of IVIG on 3/31.  He had tracheostomy on 4/1 and then developed bleeding from tracheostomy site and had laryngoscopy with removal of clot with afrin application,    He was hypothermia for a few days then spiked fever 39.1 °C on 4/3.   Blood culture was obtained that day and vancomycin /piperacillin-tazobactam was resumed.  Micafungin was added.   Later , the blood culture grew Klebsiella aerogenes. And piperacillin-tazobactam was switched to meropenem.  He had bronchoscopy/EGD on 4/4 which showed some old blood clots and inflammation of GE junction area.     ID was  called regarding his bacteremia in the setting of prolonged hospitalization with critical conditions.      Past Medical History  He has a past medical history of Diabetes mellitus (CMS/[facility]ly [facility]), Hyperlipidemia, and Morbid (severe) obesity due to excess calories (CMS/Formerly [facility]) (05/31/2016).    Surgical History  He has a past surgical history that includes Other surgical history (05/31/2016); CT guided percutaneous biopsy muscle (11/13/2023); and Invasive Vascular Procedure (N/A, 3/20/2024).     Social History     Occupational History    Not on file   Tobacco Use    Smoking status: Never    Smokeless tobacco: Never   Vaping Use    Vaping Use: Never used   Substance and Sexual Activity    Alcohol use: Never    Drug use: Never    Sexual activity: Defer     Travel History   Travel since 03/05/24    No documented travel since 03/05/24            Pets: unknown  Hobbies: unknown    Family History  Family History   Problem Relation Name Age of Onset    Coronary artery disease Mother      Diabetes Mother      Hypertension Mother      Cancer Father      Diabetes Sister      Diabetes Brother      Diabetes Other Grandmother      Allergies  Patient has no known allergies.     Immunization History   Administered Date(s) Administered    Flu vaccine, quadrivalent, no egg protein, age 6 month or greater (FLUCELVAX) 11/13/2019    Influenza, seasonal, injectable 09/09/2021    Moderna SARS-CoV-2 Vaccination 02/24/2021, 03/24/2021    PPD Test 08/31/1999, 06/23/2003, 01/28/2005, 03/10/2008    Pfizer Purple Cap SARS-CoV-2 12/09/2021    Tdap vaccine, age 7 year and older (BOOSTRIX, ADACEL) 04/03/2015    Zoster vaccine, recombinant, adult (SHINGRIX) 11/13/2019, 01/18/2020     Medications  Home medications:  Medications Prior to Admission   Medication Sig Dispense Refill Last Dose    aspirin 81 mg EC tablet Take 1 tablet (81 mg) by mouth once daily. AS DIRECTED.   Past Week    blood sugar diagnostic (Blood Glucose Test) strip once daily. One Drop Test  In Vitro Strip; Test   Past Week    chlorhexidine (Peridex) 0.12 % solution Use 15 mL in the mouth or throat see administration instructions for 2 doses. Use the night before and morning of surgery. 473 mL 0 3/5/2024    dulaglutide (Trulicity) 4.5 mg/0.5 mL pen injector Inject 4.5 mg under the skin 1 (one) time per week. 2 mL 2 2024    dulaglutide (TRULICITY) 4.5 mg/0.5 mL pen injector INJECT ONE PEN (4.5 MG) UNDER THE SKIN ONCE WEEKLY 2 mL 2 2024    ergocalciferol (Vitamin D-2) 1.25 MG (04093 UT) capsule Take 1 capsule (50,000 Units) by mouth every 14 (fourteen) days. twice monthly on the  and the  for vitamin d deficiency 6 capsule 1 2024    fish oil concentrate (Omega-3) 120-180 mg capsule Take 1 capsule (1 g) by mouth.   3/3/2024    gabapentin (Neurontin) 300 mg capsule Use the 100 mg capsule to titrate to 300 mg , then use the 300 mg capsule and take 1 to 2 capsules hs for pain 90 capsule 2 Past Month    ibuprofen 800 mg tablet Take 1 tablet (800 mg) by mouth 3 times a day as needed for mild pain (1 - 3) (pain). 180 tablet 3 Past Month    metFORMIN (Glucophage) 1,000 mg tablet TAKE 1 TABLET BY MOUTH EVERY MORNING AND 1.5 TABLETS BY MOUTH EVERY EVENING , 225 tablet 0 3/4/2024    multivitamin tablet Take 1 tablet by mouth once daily.   3/4/2024    atorvastatin (Lipitor) 40 mg tablet Take 1 tablet (40 mg) by mouth once daily. 90 tablet 1 3/3/2024 at pm    [] chlorhexidine (Hibiclens) 4 % external liquid Apply topically 2 times a day for 5 days. 473 mL 0     NIFEdipine ER (NIFEdipine XL) 30 mg 24 hr tablet Take 1 tablet (30 mg) by mouth once daily in the morning. Take before meals. Do not crush, chew, or split. (Patient not taking: Reported on 3/5/2024) 90 tablet 3 Not Taking at     OneTouch Ultra Test strip Test blood sugar once daily (Patient not taking: Reported on 3/5/2024) 100 strip 3 Not Taking    sildenafil (Viagra) 100 mg tablet Take 1 tablet (100 mg) by mouth  once daily as needed (1 hour before needed). (Patient not taking: Reported on 3/5/2024) 10 tablet 2 Not Taking    tiZANidine (Zanaflex) 4 mg capsule Take 1 capsule (4 mg) by mouth 3 times a day. (Patient not taking: Reported on 3/5/2024) 40 capsule 2 Not Taking at pt requests refill     Current medications:  Scheduled medications  fludrocortisone, 0.1 mg, nasogastric tube, q12h  hydrocortisone sodium succinate, 50 mg, intravenous, q8h  insulin glargine, 10 Units, subcutaneous, q12h  insulin lispro, 0-20 Units, subcutaneous, q4h  lidocaine (cardiac), , ,   meropenem, 1 g, intravenous, q12h  micafungin, 100 mg, intravenous, q24h  midodrine, 20 mg, orogastric tube, q8h  pantoprazole, 40 mg, intravenous, BID      Continuous medications  norepinephrine, 0.01-0.2 mcg/kg/min (Dosing Weight), Last Rate: 0.01 mcg/kg/min (04/05/24 1600)      PRN medications  PRN medications: alteplase, calcium gluconate, calcium gluconate, dextrose **OR** glucagon, lidocaine (cardiac), magnesium sulfate, magnesium sulfate, oxygen, sodium chloride, vancomycin    Review of Systems   The patient is not able to provide relative history or review of system because of being intubated  and due to sedation.    Objective  Range of Vitals (last 24 hours)  Heart Rate:  [61-81]   Temp:  [36.2 °C (97.2 °F)-36.9 °C (98.4 °F)]   Resp:  [19-26]   BP: (117-135)/(45-55)   SpO2:  [93 %-100 %]   Daily Weight  04/04/24 : 125 kg (275 lb 9.2 oz)    Body mass index is 39.54 kg/m².     Physical Exam  Constitutional:       Appearance: He is ill-appearing and toxic-appearing.      Comments: Tracheostomy  Eyes open with conjunctival injection but unable to communicate    Cardiovascular:      Rate and Rhythm: Normal rate and regular rhythm.      Heart sounds: No murmur heard.  Pulmonary:      Effort: Pulmonary effort is normal.      Breath sounds: Normal breath sounds.   Abdominal:      General: Abdomen is flat. There is no distension.      Tenderness: There is no  abdominal tenderness.   Musculoskeletal:      Comments: Large vertical surgical incision on right thigh without tenderness or erythema or discharge    Skin:     General: Skin is warm.       The patient has Marino catheter in, Central line in , rectal tube in, tracheostomy in , and Dubhoff nasogastric feeding tube       Relevant Results  Outside Hospital Results  No  Labs  Results from last 72 hours   Lab Units 04/05/24  1823 04/05/24 1742 04/05/24  1014   WBC AUTO x10*3/uL 0.6* 0.4* 2.8*   HEMOGLOBIN g/dL 9.9* 7.6* 9.7*   HEMATOCRIT % 27.5* 21.8* 27.7*   PLATELETS AUTO x10*3/uL 64* 45* 57*   NEUTROS PCT AUTO % 62.4  --   --    LYMPHS PCT AUTO % 14.3  --   --    MONOS PCT AUTO % 17.9  --   --    EOS PCT AUTO % 0.0  --   --      Results from last 72 hours   Lab Units 04/05/24 1742 04/05/24  1014 04/05/24  0548   SODIUM mmol/L 133* 132* 136   POTASSIUM mmol/L 3.7 3.6 2.9*   CHLORIDE mmol/L 96* 96* 106   CO2 mmol/L 23 25 21   BUN mg/dL 34* 24* 16   CREATININE mg/dL 1.44* 1.14 0.82   GLUCOSE mg/dL 193* 132* 105*   CALCIUM mg/dL 8.0* 7.8* 6.3*   ANION GAP mmol/L 18 15 12   EGFR mL/min/1.73m*2 55* 73 >90   PHOSPHORUS mg/dL 3.0 2.1* 1.2*     Results from last 72 hours   Lab Units 04/05/24 1742 04/05/24  1014 04/05/24  0548   ALK PHOS U/L 187* 175* 118   BILIRUBIN TOTAL mg/dL 26.0* 24.6* 19.2*   BILIRUBIN DIRECT mg/dL 17.7* 18.0* 13.5*   PROTEIN TOTAL g/dL 4.9* 4.7* 3.8*   ALT U/L 81* 79* 61*   AST U/L 139* 125* 94*   ALBUMIN g/dL 2.9* 2.8* 2.3*  2.3*     Estimated Creatinine Clearance: 70.6 mL/min (A) (by C-G formula based on SCr of 1.44 mg/dL (H)).  Sedimentation Rate   Date Value Ref Range Status   06/01/2022 14 0 - 20 mm/h Final     HIV 1/2 Antigen/Antibody Screen with Reflex to Confirmation   Date Value Ref Range Status   03/23/2024 Nonreactive Nonreactive Final     Hepatitis C AB   Date Value Ref Range Status   03/22/2024 Nonreactive Nonreactive Final     Comment:     Results from patients taking biotin supplements  or receiving high-dose biotin therapy should be interpreted with caution due to possible interference with this test. Providers may contact their local laboratory for further information.       4/5  chest x-ray  Interval increase in prominent perihilar interstitial markings and  hazy bilateral lung opacities. Blunting of the right costophrenic  angle. The left costophrenic angle is excluded from the field of  view. No pneumothorax.        Temp Readings from Last 5 Encounters:   04/05/24 36.4 °C (97.5 °F) (Temporal)   02/28/24 36.3 °C (97.3 °F) (Oral)   01/17/24 36.4 °C (97.5 °F)   01/10/24 36.2 °C (97.2 °F)   01/03/24 36.3 °C (97.3 °F)       Estimated Creatinine Clearance: 70.6 mL/min (A) (by C-G formula based on SCr of 1.44 mg/dL (H)).    Microbiology  2/28 MRSA screen positive  3/22  respiratory culture  normal throat matteo  3/22 urine culture no growth  3/22 blood culture no growth  3/30  c diff negative  4/3 respiratory culture  4+ Klebsiella aerogenes    Klebsiella (Enterobacter) aerogenes       MICROSCAN    $ Amoxicillin/Clavulanate Resistant    $$ Ampicillin Resistant    $$$ Ampicillin/Sulbactam Resistant    $ Cefazolin Resistant    $$ Cefepime Susceptible    $ Ciprofloxacin Susceptible    $ Gentamicin Susceptible    $$$ Levofloxacin Susceptible    $$ Piperacillin/Tazobactam Susceptible    $ Trimethoprim/Sulfamethoxazole Susceptible              4/3  blood culture   Klebsiella aerogenes . MDRO.        Klebsiella (Enterobacter) aerogenes       MICROSCAN    $ Amoxicillin/Clavulanate Resistant    $$ Ampicillin Resistant    $$$ Ampicillin/Sulbactam Resistant    $ Aztreonam Susceptible    $ Cefazolin Resistant    $$ Cefepime Susceptible    $ Ciprofloxacin Susceptible    $$$$ Ertapenem Susceptible    $ Gentamicin Susceptible    $$$ Imipenem Intermediate    $$$ Levofloxacin Susceptible    $$$ Meropenem Susceptible    $$ Piperacillin/Tazobactam Resistant    $ Trimethoprim/Sulfamethoxazole Susceptible              4/4  blood culture no growth       Antibiotic history   Cefazolin 3/5  Vancomycin 3/21~ 3/23,  4/3~  Piperacillin-tazobactam   3/20~3/26,  4/3  Meropenem 4/4~  Micafungin 4/3~        INFECTIOUS DISEASE SYNOPSIS AND ASSESSMENT  62yo male with recent surgery for right gluteal chondrosarcoma complicated with Pulseless electrical activity,  surgical site bleeding ,  shock. Tracheostomy site bleeding , thrombocytopenia , HLH  now presenting with fever / bacteremia with MDRO Klebsiella aerogenes ,  Given the pt is in critical condition on pressor support and worsening pancytopenia, we will keep him on broad spectrum antibiotic with possible de escalation as he become more stable.        Gram negative ( MDRO Klebsiella aerogenes ) bacteremia      - secondary to ventilation associated pneumonia ( VAP) given the same organism grew from BAL culture     2.   Pancytopenia .      - recent work up consistent with HLH. Although superimposed DIC/HIT possible too     - will defer to ICU team for the management      RECOMMENDATION  CONTINUE meropenem  1 gram q 12 hours  CONTINUE vancomycin   CONTINUE micafungin 100mg q 24 hours           ID will follow.   The case was discussed with my attending , Dr. Gilberto Hilario  who agreed with my assessment and plan.    ANA CRISTINA MORALES M.D /  Infectious disease consult team A   Infectious disease fellow PGY-4  Reach me through [employer] or Page [phone] ( EpicChat preferable especially during noon conference )

## 2024-04-05 NOTE — PROGRESS NOTES
Jason Hollins is a 63 y.o. male on day 31 of admission presenting with Soft tissue sarcoma (CMS/HCC).    Subjective   1 rbc transfused overnight  On/off 0.01 levo  Tolerated SLED        Objective     Physical Exam  Vitals reviewed.   Constitutional:       Appearance: He is ill-appearing.      Comments: Trached.   HENT:      Head:      Comments: Edematous face/head/neck     Nose:      Comments: Dobhoff in R nare bridled in place at 65cm.     Mouth/Throat:      Mouth: Mucous membranes are dry.   Eyes:      General: Scleral icterus present.      Pupils: Pupils are equal, round, and reactive to light.      Comments: Conjunctival edema. Subconjunctival hemorrhage. Pupils pinpoint   Neck:      Comments: Trach midline. 6-0 proximal Shiley XLT, no drainage around site. RIJ trialysis line in place, dressing c/d/i.   Cardiovascular:      Rate and Rhythm: Normal rate and regular rhythm.      Pulses:           Radial pulses are 1+ on the right side and 1+ on the left side.      Heart sounds: Normal heart sounds.   Pulmonary:      Comments: Mechanically ventilated via trach.  Abdominal:      General: Abdomen is protuberant. Bowel sounds are normal.      Palpations: Abdomen is soft.      Comments: Obese abdomen. Enteral feeding tube bridled in place   Genitourinary:     Comments: Penile and scrotal edema. Dignicare in place with liquid brown stool.  Musculoskeletal:      Cervical back: Neck supple.   Skin:     General: Skin is warm.      Coloration: Skin is jaundiced.      Comments: Anasarca. Right flap wound partially covered with VAC, no output. Sutures in place. ANDERSON x 3 all with serosanguinous output.   Neurological:      GCS: GCS eye subscore is 3. GCS verbal subscore is 1. GCS motor subscore is 6.      Comments: Did not move RLE to commands or spontaneously   Psychiatric:      Comments: Calm off sedation   Last Recorded Vitals  Blood pressure 131/53, pulse 71, temperature 36.2 °C (97.2 °F), temperature source Temporal, resp. rate  "21, height 1.778 m (5' 10\"), weight 125 kg (275 lb 9.2 oz), SpO2 95 %.    VS over last 24h  Heart Rate:  [62-92]   Temp:  [36.2 °C (97.2 °F)-37.1 °C (98.8 °F)]   Resp:  [19-25]   BP: (129-150)/(52-55)   Weight:  [125 kg (275 lb 9.2 oz)]   SpO2:  [95 %-100 %]        Intake/Output Summary (Last 24 hours) at 4/5/2024 0810  Last data filed at 4/5/2024 0700  Gross per 24 hour   Intake 2816.02 ml   Output 2430 ml   Net 386.02 ml          Scheduled medications  fludrocortisone, 0.1 mg, nasogastric tube, q12h  hydrocortisone sodium succinate, 50 mg, intravenous, q8h  insulin glargine, 10 Units, subcutaneous, q12h  insulin lispro, 0-20 Units, subcutaneous, q4h  meropenem, 1 g, intravenous, q12h  micafungin, 100 mg, intravenous, q24h  midodrine, 20 mg, orogastric tube, q8h  pantoprazole, 40 mg, intravenous, BID  potassium chloride, 40 mEq, intravenous, Once  sodium phosphate, 30 mmol, intravenous, Once      Continuous medications  lactated Ringer's, 5 mL/hr, Last Rate: 5 mL/hr (04/05/24 0700)  norepinephrine, 0.01-0.2 mcg/kg/min (Dosing Weight), Last Rate: 0.01 mcg/kg/min (04/05/24 0700)  propofol, 5-50 mcg/kg/min, Last Rate: 15 mcg/kg/min (04/05/24 0700)      PRN medications  PRN medications: alteplase, calcium gluconate, calcium gluconate, dextrose **OR** glucagon, magnesium sulfate, magnesium sulfate, oxygen, sodium chloride, vancomycin      Results for orders placed or performed during the hospital encounter of 03/05/24 (from the past 24 hour(s))   Prepare RBC: 4 Units   Result Value Ref Range    PRODUCT CODE [id number]     Unit Number [id number]     Unit ABO O     Unit RH POS     XM INTEP COMP     Dispense Status XM     Blood Expiration Date April 29, 2024 23:59 EDT     PRODUCT BLOOD TYPE 5100     UNIT VOLUME 350     PRODUCT CODE U1458W00     Unit Number [id number]     Unit ABO O     Unit RH POS     XM INTEP COMP     Dispense Status XM     Blood Expiration Date May 02, 2024 23:59 EDT     PRODUCT BLOOD TYPE 5100  "    UNIT VOLUME 287     PRODUCT CODE L3336P39     Unit Number [id number]     Unit ABO O     Unit RH POS     XM INTEP COMP     Dispense Status XM     Blood Expiration Date May 02, 2024 23:59 EDT     PRODUCT BLOOD TYPE 5100     UNIT VOLUME 350     PRODUCT CODE F7230R78     Unit Number [id number]     Unit ABO O     Unit RH POS     XM INTEP COMP     Dispense Status TR     Blood Expiration Date May 01, 2024 23:59 EDT     PRODUCT BLOOD TYPE 5100     UNIT VOLUME 350    POCT GLUCOSE   Result Value Ref Range    POCT Glucose 262 (H) 74 - 99 mg/dL   Blood Gas Arterial Full Panel   Result Value Ref Range    POCT pH, Arterial 7.35 (L) 7.38 - 7.42 pH    POCT pCO2, Arterial 42 38 - 42 mm Hg    POCT pO2, Arterial 114 (H) 85 - 95 mm Hg    POCT SO2, Arterial 100 94 - 100 %    POCT Oxy Hemoglobin, Arterial 96.3 94.0 - 98.0 %    POCT Hematocrit Calculated, Arterial 25.0 (L) 41.0 - 52.0 %    POCT Sodium, Arterial 126 (L) 136 - 145 mmol/L    POCT Potassium, Arterial 5.1 3.5 - 5.3 mmol/L    POCT Chloride, Arterial 98 98 - 107 mmol/L    POCT Ionized Calcium, Arterial 1.05 (L) 1.10 - 1.33 mmol/L    POCT Glucose, Arterial 267 (H) 74 - 99 mg/dL    POCT Lactate, Arterial 2.4 (H) 0.4 - 2.0 mmol/L    POCT Base Excess, Arterial -2.3 (L) -2.0 - 3.0 mmol/L    POCT HCO3 Calculated, Arterial 23.2 22.0 - 26.0 mmol/L    POCT Hemoglobin, Arterial 8.4 (L) 13.5 - 17.5 g/dL    POCT Anion Gap, Arterial 10 10 - 25 mmo/L    Patient Temperature 37.0 degrees Celsius    FiO2 70 %   Prepare Platelets: 1 Units   Result Value Ref Range    PRODUCT CODE [id number]     Unit Number [id number]     Unit ABO A     Unit RH POS     Dispense Status TR     Blood Expiration Date April 05, 2024 23:59 EDT     PRODUCT BLOOD TYPE 6200     UNIT VOLUME 259    TEG Clot Global Profile   Result Value Ref Range    R (Reaction Time) K 9.8 (H) 4.6 - 9.1 min    K (Clot Kinetics) 1.8 0.8 - 2.1 min    ANGLE 69.6 63.0 - 78.0 deg    MA (Max Amplitude) K 56.3 52.0 - 69.0 mm    R  (Reaction Time) KH 10.0 (H) 4.3 - 8.3 min    MA (Max Amplitude) RT 56.8 52.0 - 70.0 mm    MA ( Ramiro Amplitude) FF 21.9 15.0 - 32.0 mm    FLEV 400 278 - 581 mg/dL   Fibrinogen   Result Value Ref Range    Fibrinogen 270 200 - 400 mg/dL   Magnesium   Result Value Ref Range    Magnesium 2.17 1.60 - 2.40 mg/dL   Calcium, Ionized   Result Value Ref Range    POCT Calcium, Ionized 0.96 (L) 1.1 - 1.33 mmol/L   Hepatic Function Panel   Result Value Ref Range    Albumin 3.1 (L) 3.4 - 5.0 g/dL    Bilirubin, Total 26.1 (H) 0.0 - 1.2 mg/dL    Bilirubin, Direct 19.5 (H) 0.0 - 0.3 mg/dL    Alkaline Phosphatase 171 (H) 33 - 136 U/L    ALT 92 (H) 10 - 52 U/L    AST 96 (H) 9 - 39 U/L    Total Protein 4.9 (L) 6.4 - 8.2 g/dL   Coagulation Screen   Result Value Ref Range    Protime 14.7 (H) 9.8 - 12.8 seconds    INR 1.3 (H) 0.9 - 1.1    aPTT 39 (H) 27 - 38 seconds   CBC   Result Value Ref Range    WBC 2.8 (L) 4.4 - 11.3 x10*3/uL    nRBC 1.8 (H) 0.0 - 0.0 /100 WBCs    RBC 2.50 (L) 4.50 - 5.90 x10*6/uL    Hemoglobin 7.5 (L) 13.5 - 17.5 g/dL    Hematocrit 21.9 (L) 41.0 - 52.0 %    MCV 88 80 - 100 fL    MCH 30.0 26.0 - 34.0 pg    MCHC 34.2 32.0 - 36.0 g/dL    RDW 19.9 (H) 11.5 - 14.5 %    Platelets 53 (L) 150 - 450 x10*3/uL   Phosphorus   Result Value Ref Range    Phosphorus 5.2 (H) 2.5 - 4.9 mg/dL   Basic Metabolic Panel   Result Value Ref Range    Glucose 250 (H) 74 - 99 mg/dL    Sodium 132 (L) 136 - 145 mmol/L    Potassium 5.0 3.5 - 5.3 mmol/L    Chloride 95 (L) 98 - 107 mmol/L    Bicarbonate 21 21 - 32 mmol/L    Anion Gap 21 (H) 10 - 20 mmol/L    Urea Nitrogen 57 (H) 6 - 23 mg/dL    Creatinine 2.60 (H) 0.50 - 1.30 mg/dL    eGFR 27 (L) >60 mL/min/1.73m*2    Calcium 7.6 (L) 8.6 - 10.6 mg/dL   CBC   Result Value Ref Range    WBC 3.5 (L) 4.4 - 11.3 x10*3/uL    nRBC 0.6 (H) 0.0 - 0.0 /100 WBCs    RBC 2.67 (L) 4.50 - 5.90 x10*6/uL    Hemoglobin 7.9 (L) 13.5 - 17.5 g/dL    Hematocrit 23.2 (L) 41.0 - 52.0 %    MCV 87 80 - 100 fL    MCH 29.6 26.0  - 34.0 pg    MCHC 34.1 32.0 - 36.0 g/dL    RDW 19.6 (H) 11.5 - 14.5 %    Platelets 68 (L) 150 - 450 x10*3/uL   Vancomycin   Result Value Ref Range    Vancomycin 12.3 5.0 - 20.0 ug/mL   Blood Culture    Specimen: Peripheral Venipuncture; Blood culture   Result Value Ref Range    Blood Culture Loaded on Instrument - Culture in progress    Blood Culture    Specimen: Peripheral Venipuncture; Blood culture   Result Value Ref Range    Blood Culture Loaded on Instrument - Culture in progress    CALCIUM, IONIZED   Result Value Ref Range    POCT Calcium, Ionized 0.92 (L) 1.1 - 1.33 mmol/L   Coagulation Screen   Result Value Ref Range    Protime 14.0 (H) 9.8 - 12.8 seconds    INR 1.2 (H) 0.9 - 1.1    aPTT 35 27 - 38 seconds   Blood Gas Arterial Full Panel   Result Value Ref Range    POCT pH, Arterial 7.33 (L) 7.38 - 7.42 pH    POCT pCO2, Arterial 41 38 - 42 mm Hg    POCT pO2, Arterial 108 (H) 85 - 95 mm Hg    POCT SO2, Arterial 100 94 - 100 %    POCT Oxy Hemoglobin, Arterial 95.6 94.0 - 98.0 %    POCT Hematocrit Calculated, Arterial 26.0 (L) 41.0 - 52.0 %    POCT Sodium, Arterial 127 (L) 136 - 145 mmol/L    POCT Potassium, Arterial 5.3 3.5 - 5.3 mmol/L    POCT Chloride, Arterial 95 (L) 98 - 107 mmol/L    POCT Ionized Calcium, Arterial 1.11 1.10 - 1.33 mmol/L    POCT Glucose, Arterial 225 (H) 74 - 99 mg/dL    POCT Lactate, Arterial 2.3 (H) 0.4 - 2.0 mmol/L    POCT Base Excess, Arterial -4.1 (L) -2.0 - 3.0 mmol/L    POCT HCO3 Calculated, Arterial 21.6 (L) 22.0 - 26.0 mmol/L    POCT Hemoglobin, Arterial 8.8 (L) 13.5 - 17.5 g/dL    POCT Anion Gap, Arterial 16 10 - 25 mmo/L    Patient Temperature 37.0 degrees Celsius    FiO2 60 %   Renal Function Panel   Result Value Ref Range    Glucose 218 (H) 74 - 99 mg/dL    Sodium 132 (L) 136 - 145 mmol/L    Potassium 4.9 3.5 - 5.3 mmol/L    Chloride 97 (L) 98 - 107 mmol/L    Bicarbonate 22 21 - 32 mmol/L    Anion Gap 18 10 - 20 mmol/L    Urea Nitrogen 55 (H) 6 - 23 mg/dL    Creatinine 2.44  (H) 0.50 - 1.30 mg/dL    eGFR 29 (L) >60 mL/min/1.73m*2    Calcium 7.8 (L) 8.6 - 10.6 mg/dL    Phosphorus 5.2 (H) 2.5 - 4.9 mg/dL    Albumin 3.2 (L) 3.4 - 5.0 g/dL   Magnesium   Result Value Ref Range    Magnesium 2.11 1.60 - 2.40 mg/dL   POCT GLUCOSE   Result Value Ref Range    POCT Glucose 217 (H) 74 - 99 mg/dL   Blood Gas Arterial Full Panel   Result Value Ref Range    POCT pH, Arterial 7.40 7.38 - 7.42 pH    POCT pCO2, Arterial 40 38 - 42 mm Hg    POCT pO2, Arterial 153 (H) 85 - 95 mm Hg    POCT SO2, Arterial 100 94 - 100 %    POCT Oxy Hemoglobin, Arterial 96.8 94.0 - 98.0 %    POCT Hematocrit Calculated, Arterial 26.0 (L) 41.0 - 52.0 %    POCT Sodium, Arterial 129 (L) 136 - 145 mmol/L    POCT Potassium, Arterial 4.4 3.5 - 5.3 mmol/L    POCT Chloride, Arterial 98 98 - 107 mmol/L    POCT Ionized Calcium, Arterial 1.13 1.10 - 1.33 mmol/L    POCT Glucose, Arterial 190 (H) 74 - 99 mg/dL    POCT Lactate, Arterial 1.7 0.4 - 2.0 mmol/L    POCT Base Excess, Arterial 0.0 -2.0 - 3.0 mmol/L    POCT HCO3 Calculated, Arterial 24.8 22.0 - 26.0 mmol/L    POCT Hemoglobin, Arterial 8.6 (L) 13.5 - 17.5 g/dL    POCT Anion Gap, Arterial 11 10 - 25 mmo/L    Patient Temperature 37.0 degrees Celsius    FiO2 50 %   POCT GLUCOSE   Result Value Ref Range    POCT Glucose 163 (H) 74 - 99 mg/dL   POCT GLUCOSE   Result Value Ref Range    POCT Glucose 117 (H) 74 - 99 mg/dL   CALCIUM, IONIZED   Result Value Ref Range    POCT Calcium, Ionized 1.00 (L) 1.1 - 1.33 mmol/L   CBC   Result Value Ref Range    WBC 2.9 (L) 4.4 - 11.3 x10*3/uL    nRBC 1.0 (H) 0.0 - 0.0 /100 WBCs    RBC 2.42 (L) 4.50 - 5.90 x10*6/uL    Hemoglobin 7.0 (L) 13.5 - 17.5 g/dL    Hematocrit 20.4 (L) 41.0 - 52.0 %    MCV 84 80 - 100 fL    MCH 28.9 26.0 - 34.0 pg    MCHC 34.3 32.0 - 36.0 g/dL    RDW 19.5 (H) 11.5 - 14.5 %    Platelets 56 (L) 150 - 450 x10*3/uL   Coagulation Screen   Result Value Ref Range    Protime 13.4 (H) 9.8 - 12.8 seconds    INR 1.2 (H) 0.9 - 1.1    aPTT 36  27 - 38 seconds   Magnesium   Result Value Ref Range    Magnesium 1.68 1.60 - 2.40 mg/dL   Renal Function Panel   Result Value Ref Range    Glucose 120 (H) 74 - 99 mg/dL    Sodium 136 136 - 145 mmol/L    Potassium 3.0 (L) 3.5 - 5.3 mmol/L    Chloride 101 98 - 107 mmol/L    Bicarbonate 23 21 - 32 mmol/L    Anion Gap 15 10 - 20 mmol/L    Urea Nitrogen 21 6 - 23 mg/dL    Creatinine 0.98 0.50 - 1.30 mg/dL    eGFR 87 >60 mL/min/1.73m*2    Calcium 7.3 (L) 8.6 - 10.6 mg/dL    Phosphorus 1.4 (L) 2.5 - 4.9 mg/dL    Albumin 2.7 (L) 3.4 - 5.0 g/dL   POCT GLUCOSE   Result Value Ref Range    POCT Glucose 137 (H) 74 - 99 mg/dL   CALCIUM, IONIZED   Result Value Ref Range    POCT Calcium, Ionized 0.94 (L) 1.1 - 1.33 mmol/L   CBC   Result Value Ref Range    WBC 2.0 (L) 4.4 - 11.3 x10*3/uL    nRBC 0.0 0.0 - 0.0 /100 WBCs    RBC 2.47 (L) 4.50 - 5.90 x10*6/uL    Hemoglobin 7.0 (L) 13.5 - 17.5 g/dL    Hematocrit 20.8 (L) 41.0 - 52.0 %    MCV 84 80 - 100 fL    MCH 28.3 26.0 - 34.0 pg    MCHC 33.7 32.0 - 36.0 g/dL    RDW 19.3 (H) 11.5 - 14.5 %    Platelets 43 (L) 150 - 450 x10*3/uL   Magnesium   Result Value Ref Range    Magnesium 1.67 1.60 - 2.40 mg/dL   Renal Function Panel   Result Value Ref Range    Glucose 105 (H) 74 - 99 mg/dL    Sodium 136 136 - 145 mmol/L    Potassium 2.9 (LL) 3.5 - 5.3 mmol/L    Chloride 106 98 - 107 mmol/L    Bicarbonate 21 21 - 32 mmol/L    Anion Gap 12 10 - 20 mmol/L    Urea Nitrogen 16 6 - 23 mg/dL    Creatinine 0.82 0.50 - 1.30 mg/dL    eGFR >90 >60 mL/min/1.73m*2    Calcium 6.3 (L) 8.6 - 10.6 mg/dL    Phosphorus 1.2 (L) 2.5 - 4.9 mg/dL    Albumin 2.3 (L) 3.4 - 5.0 g/dL   Blood Gas Arterial Full Panel   Result Value Ref Range    POCT pH, Arterial 7.46 (H) 7.38 - 7.42 pH    POCT pCO2, Arterial 36 (L) 38 - 42 mm Hg    POCT pO2, Arterial 105 (H) 85 - 95 mm Hg    POCT SO2, Arterial 100 94 - 100 %    POCT Oxy Hemoglobin, Arterial 96.6 94.0 - 98.0 %    POCT Hematocrit Calculated, Arterial 26.0 (L) 41.0 - 52.0 %     POCT Sodium, Arterial 130 (L) 136 - 145 mmol/L    POCT Potassium, Arterial 2.9 (LL) 3.5 - 5.3 mmol/L    POCT Chloride, Arterial 98 98 - 107 mmol/L    POCT Ionized Calcium, Arterial 1.11 1.10 - 1.33 mmol/L    POCT Glucose, Arterial 124 (H) 74 - 99 mg/dL    POCT Lactate, Arterial 1.3 0.4 - 2.0 mmol/L    POCT Base Excess, Arterial 1.8 -2.0 - 3.0 mmol/L    POCT HCO3 Calculated, Arterial 25.6 22.0 - 26.0 mmol/L    POCT Hemoglobin, Arterial 8.8 (L) 13.5 - 17.5 g/dL    POCT Anion Gap, Arterial 9 (L) 10 - 25 mmo/L    Patient Temperature 37.0 degrees Celsius    FiO2 50 %        Assessment/Plan   Jason Hollins is a 63 y.o. 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 Hawkins and Dr. Smith on 3/5/24.  Post operative course was uncomplicated and patient was on RNF until 3/20 for prolonged bed rest to avoid hip flexion to maintain flap integrity.  Patient was on prophylactic lovenox while on bedrest.     3/20: Cardiopulmonary arrest s/p CPR with ROSC after TPA, overnight started on heparin, epo, increased pressor requirements, increased swelling at flap site.     3/21: Hemorrhagic shock, MTP. Firm and large right flap site. Return to OR s/p Exploration of right thigh wound, Evacuation of hematoma. Suture ligation of multiple bleeding vessel and several points in gluteal area.     4/1: s/p Trach    4/4: return OR with ENT s/p laryngoscopy, esophagoscopy and bronchoscopy, trach revision. Found extensive clot burden in esophagus and stomach as well as in the lungs. Oozing at thyroid bed cauterized. Trach exchanged to new 6.0 prox XLT elvie. OR findings:  blood up glottis and from thyroid bed to airway.    Plan:  NEURO: History of myxoid chondrosarcoma s/p wide resection sarcoma, sciatic nerve neurolysis of right lower extremity with right  gluteal reconstruction, pedicle ALT, vastus lateralus flap, pedicle gluteal and perisformis flap with Dr. Hawkins and Dr. Smith on 3/5/24 s/p cardiopulmonary arrest with ROSC 3/20. CT head 3/22 without acute process. Weaned off cisatracurium and propofol overnight 3/23-3/24. Ketamine weaned off 3/25 and Fentanyl weaned off 3/26 am. Repeat CT Head 3/26 without acute process. MRI Brain 3/30, negative for cerebral infarction or hemorrhage. Neuro exam - off sedation, following commands except for RLE, tracking, nodding/shaking head to questions  - ongoing neuro and pain assessments  - keep off sedation  - PT/OT -> AROM  - no need for soft wrist restraints at this time -> continue to reassess     CV: History of HTN, HLD. Acute cardiopulmonary arrest 2/2 to possible massive PE vs massive STEMI, received multiple rounds of CPR and one defibrillation.  Sustained ROSC achieved after TPA bolus. On high dose levophed, epinephrine, vaso, angioT2. Plan on 3/21 was for ECMO which was aborted secondary to large hemhorrge at right flap site. Had MTP and taken OR with 5L evacuated. ECHO 3/21: Normal LV, Mod enlarged RV, slight suggestion of a Townsend's sign / RV strain, low normal RV function. ECHO 3/22 with hyperdynamic LV. New onset Afib with RVR overnight 3/22-3/23, dosed with 150mg amio x2, started infusion at 1mg/min thereafter. AT2 weaned off. Amio infusion discontinued 3/25. Lactate downtrending. Vaso and epi weaned off. Remains in NSR. iEpo weaned off 3/27. Repeat TTE 3/29 and 4/2 essentially unchanged. Levo resumed 4/2 evening to continue aggressive fluid removal. Levo 0.01mcg/kg/min at time of exam  - continuous EKG/abp/cvp monitoring  - Goal map range 65-90  - wean levophed as tolerated  - Continue midodrine 20mg q8h  - Continue florinef 0.1mg BID   - Continue SDS with hydrocortisone 50mg q12h -> dencrease frequency to q8h  - Holding heparin infusion for now      PULM: Arrived to SICU intubated julio c-CPR. Concern for  massive PE. Started on iEpo, currently on 0.05. Hypoxic respiratory failure. Severe ARDS. ETT exchanged 3/23. CT Chest 3/26 with interval JOHN consolidative/ground glass opacity. S/p Tracheostomy on 4/1. Currently on SIMV 40%, 550, 18, +10, 5 PS. PIP up to 41 with Vt ~250-300. RT able to suction out clot and PIP improved to 26 and Vt back to ~500. Bedside bronch -> some blood in trach, posterior wall tissue just distal to trach tip appears to be collapsing on cannula.   - Wean FiO2 as tolerated  - ABGs prn  - additional pulm toilet prn  - daily CXR    ENT: Had epistaxis 3/23, completed afrin bid x3 days. S/p trach on 4/1. Bleeding from trach site 4/2 am, packing placed by ENT. Repeat episode of bloody tracheal secretions 4/3 through this am. Taken back to OR with ENT 4/4 as per above.  - TXA as above and holding heparin for now     GI: NPO. Shock liver, pancreatitis. Elevated TG. Elevated lactate. Consulted ACS for potential bowel ischemia as cause of persistent lactic acidosis. CT imaging 3/22 with evidence of pancreatitis. No acute surgical indication per ACS. RUQ US 3/22 with thickening of GB wall without cholelithiasis. CT A/P 3/26 negative for acute bleed. LFTs downtrending. Worsening hyperbilirubinemia. Amylase and lipase now WNL. Repeat RUQ US 4/1 with nonspecific gallbladder wall edema and thickening which may be 2/2 generalized fluid overload, mild hepatomegaly with slight nodular contour and c/f steatosis and fibrosis, irregular hypoechoic region adjacent to hepatic veins. US liver with doppler 4/2 revealed hepatomegaly with nodular contour, mildly elevated RI in main and left hepatic arteries. Hyperbilirubinemia  - Hepatology following, appreciate recs  - resume TF  - prostat daily  - PPI BID for GI ppx  - Trend LFTs daily     : No history of renal disease. Baseline creatinine 0.6. Anuric RUTH. Elevated CK, downtrending. Metabolic acidosis, total body fluid overload, hyperkalemia. Started on CVVH 3/21,  stopped bicarb infusion 3/22. Marino removed 3/24. Hyponatremia resolved. Stopped CVVH 4/2. Tolerated 2L fluid removal with SLED overnight.  - Nephrology following, appreciate recs -> recommending SLED again tonight  - RFP BID and PRN  - Replete electrolytes judiciously  - Bladder scan 4/4 -> 6ml      HEME: Patient was on ppx lovenox for DVT prophylaxis prior to cardiopulmonary arrest. Concern for massive PE patient received bolus of TPA during CPR. Started on heparin infusion overnight given concern for PE. Hemorrhagic shock 3/21, MTP and back to OR s/p Exploration of right thigh woundEvacuation of hematoma. Suture ligation of multiple bleeding vessel and several points in gluteal area. Hematology consulted 3/22 to r/o HLH. Transfused 1 RBC overnight 3/22-3/23. Thrombocytopenia, coagulapathy, hypofibrinogenemia. LE Duplex 3/25 +acute occlusive R soleal DVT. Received 2u PRBC and 1u FFP on 3/26. Heparin infusion discontinued 3/26 to r/o HIT and transitioned to bival. PF4 negative, resumed heparin infuision 3/27. Elevated IL2R and decreased NK function. C/f HLH (low suspicion), empirically treated with decadron (3/28-3/31). Thrombocytopenia on 3/30 to 18k, received 5pk, did not increment. Heparin held. Thrombocytopenia likely 2/2 to consumptive process, hematology following. Received IVIG and 5pk plts 3/31. Pancytopenia persists, improving thrombocytopenia  - cbc, coags bid and prn  - holding heparin infusion -> consider starting subcutaneous heparin tomorrow  - Hematology following, appreciate recs -> maintain Plt count >35k  - ongoing monitoring for s/s bleeding  - close surveillance of RLE and drains  - Vasc Med signed off 3/27  - maintain active T&S (4/3)    ENDO: History of DM2. A1c 7.5%. TSH WNL 1/2024. Hyperglycemia  - continue lantus 10u q12h  - q4h accuchecks and SSI #4     ID: Leukocytosis resolved, now with leukopenia. On broad antimicrobial therapy. MRSA screen + 2/28/24. Pan cultures sent 3/22. Sputum NTF,  +MRSA screen (completed 5 day course mupirocin), UCx and BCx negative. Completed 7 day course vanc/zosyn 3/27. Febrile to 39.1 4/3, resent blood cultures and BAL and started vanco and zosyn. Switched zosyn to reynaldo, kept on vanco, added john. Blood cultures and sputum with Klebsiella. Repeat blood cultures sent 4/4.  - F/U cultures  - temp q4h, wbc daily  - continue reynaldo, vanco and john  - ongoing monitoring for s/s infection  - remove subclavian central line  - ID consult  - plastics team noted some darkened discoloration of the native tissue at the R posterior hip incision that is c/f necrosis that the surgeon would like to ideally debride and close on 4/9 if patient is medically stable    Lines:  - right brachial arterial line (3/20) -> resite to radial today  - RIJ trialysis (3/27)  - left subclavian MAC with mini MAC (3/20) -> remove today  - PIV x2    Family: nieces updated at bedside today     Dispo: Continue ICU care. Patient seen and discussed with ICU attending Dr. Nayan Skaggs, APRN-CNP  SICU phone [phone]    Critical Care time: 60 minutes

## 2024-04-05 NOTE — PROGRESS NOTES
Vancomycin Dosing by Pharmacy  FOLLOW UP    Jason Hollins is a 63 y.o. male who Pharmacy is consulted to dose vancomycin for pneumonia.     Based on the patient's indication and renal status, this patient is being dosed based on a goal vancomycin level of 15-20.     Current vancomycin dose: Dose by levels while receiving SLED    Most recent vancomycin level: 13.8 mcg/mL (post-SLED 4 hour level)    Renal function is currently dependent on Sustained Low-Efficiency Dialysis (SLED).    Estimated Creatinine Clearance: 123.9 mL/min (by C-G formula based on SCr of 0.82 mg/dL).    Vancomycin   Date Value Ref Range Status   04/05/2024 13.8 5.0 - 20.0 ug/mL Final   04/04/2024 12.3 5.0 - 20.0 ug/mL Final   03/25/2024 17.0 5.0 - 20.0 ug/mL Final   03/23/2024 21.8 (H) 5.0 - 20.0 ug/mL Final     Vancomycin, Trough   Date Value Ref Range Status   03/27/2024 23.1 (HH) 5.0 - 20.0 ug/mL Final     Comment:     Therapeutic Ranges:    Peak (all ages):        30.0-40.0 ug/mL                       Trough (all ages):        10.0-20.0 ug/mL                                             Vancomycin trough concentrations drawn immediately prior to the next dose at steady-state are preferred for concentration-guided monitoring of patients treated with vancomycin.    Reference: Am J Health-Syst Pharm. 2020; 77(11):830-778.        03/25/2024 16.0 5.0 - 20.0 ug/mL Final     Comment:     Therapeutic Ranges:    Peak (all ages):        30.0-40.0 ug/mL                       Trough (all ages):        10.0-20.0 ug/mL                                             Vancomycin trough concentrations drawn immediately prior to the next dose at steady-state are preferred for concentration-guided monitoring of patients treated with vancomycin.    Reference: Am J Health-Syst Pharm. 2020; 77(11):835-864.        03/23/2024 13.9 5.0 - 20.0 ug/mL Final     Comment:     Therapeutic Ranges:    Peak (all ages):        30.0-40.0 ug/mL                       Trough (all ages):         10.0-20.0 ug/mL                                             Vancomycin trough concentrations drawn immediately prior to the next dose at steady-state are preferred for concentration-guided monitoring of patients treated with vancomycin.    Reference: Am J Health-Syst Pharm. 2020; 77(11):835-864.            Results from last 7 days   Lab Units 04/05/24  0548 04/05/24  0024 04/04/24  1356 04/04/24  1352 04/04/24  1033   CREATININE mg/dL 0.82 0.98 2.44*  --  2.60*   BUN mg/dL 16 21 55*  --  57*   WBC AUTO x10*3/uL 2.0* 2.9*  --  3.5* 2.8*        Visit Vitals  BP (!) 117/45   Pulse 71   Temp 36.5 °C (97.7 °F) (Temporal)   Resp 19       Staph/MRSA Screen Culture   Date/Time Value Ref Range Status   03/24/2024 12:53 AM (A)  Final    Isolated: Methicillin Resistant Staphylococcus aureus (MRSA)     Gram Stain   Date/Time Value Ref Range Status   04/03/2024 07:24 PM Gram negative bacilli (AA)  Preliminary     Comment:     Aerobic and Anaerobic Bottle Positive     Urine Culture   Date/Time Value Ref Range Status   03/22/2024 01:57 PM No growth  Final     Blood Culture   Date/Time Value Ref Range Status   04/04/2024 01:52 PM Loaded on Instrument - Culture in progress  Preliminary   04/04/2024 01:52 PM Loaded on Instrument - Culture in progress  Preliminary        Assessment/Plan    Vancomycin level is below goal range of 15-20. Will re-dose vancomycin with one time order of 2000 mg.    The next vancomycin level will be ordered for 4/6 AM once renal plan ist established, unless clinically indicated sooner.  Will continue to monitor renal function daily while on vancomycin and order serum creatinine at least every 48 hours if not already ordered.  Will follow for continued vancomycin needs, clinical response, and signs/symptoms of toxicity.       Mikal Coburn, AvelinoD

## 2024-04-05 NOTE — PROCEDURES
Arterial Puncture/Cannulation    Date/Time: 4/5/2024 2:30 PM    Performed by: Danilo Torres MD  Authorized by: Danilo Torres MD  Consent: Verbal consent obtained. Written consent not obtained.  Risks and benefits: risks, benefits and alternatives were discussed  Consent given by: patient and spouse  Patient understanding: patient states understanding of the procedure being performed  Patient identity confirmed: arm band and verbally with patient  Preparation: Patient was prepped and draped in the usual sterile fashion.  Local anesthesia used: yes  Anesthesia: local infiltration    Anesthesia:  Local anesthesia used: yes  Local Anesthetic: lidocaine 1% without epinephrine    Sedation:  Patient sedated: no    Patient tolerance: patient tolerated the procedure well with no immediate complications  Comments: The patient was prepped and draped in the usual sterile manner using chlorhexidine scrub. Approx 3cc of 1% lidocaine was infiltrated under the skin for local anesthesia. The L radial artery was identified under US and a 20g introducer needle was used to cannulate the vessel. Pulsatile, arterial blood was visualized and upon attempt to pass guidewire resistance was met likely secondary to calcification. Attempt was aborted and pressure was applied to arterial puncture site until hemostasis achieved.   A second attempted was performed proximal to prior attempt and guidewire was inserted smoothly. The catheterwas then advanced over the wire. Wire was removed. A pressure transducer was then put in line that revealed a good wave-form. The patient tolerated the procedure well without any immediate complications. The area was cleaned and a transparent dressing was applied.

## 2024-04-06 ENCOUNTER — APPOINTMENT (OUTPATIENT)
Dept: RADIOLOGY | Facility: HOSPITAL | Age: 64
DRG: 003 | End: 2024-04-06
Payer: COMMERCIAL

## 2024-04-06 LAB
ABO GROUP (TYPE) IN BLOOD: NORMAL
ALBUMIN SERPL BCP-MCNC: 2.6 G/DL (ref 3.4–5)
ALBUMIN SERPL BCP-MCNC: 2.7 G/DL (ref 3.4–5)
ALBUMIN SERPL BCP-MCNC: 2.7 G/DL (ref 3.4–5)
ALP SERPL-CCNC: 234 U/L (ref 33–136)
ALT SERPL W P-5'-P-CCNC: 74 U/L (ref 10–52)
ANION GAP BLDA CALCULATED.4IONS-SCNC: 10 MMO/L (ref 10–25)
ANION GAP BLDA CALCULATED.4IONS-SCNC: 7 MMO/L (ref 10–25)
ANION GAP BLDA CALCULATED.4IONS-SCNC: 9 MMO/L (ref 10–25)
ANION GAP SERPL CALC-SCNC: 14 MMOL/L (ref 10–20)
ANTIBODY SCREEN: NORMAL
APTT PPP: 28 SECONDS (ref 27–38)
AST SERPL W P-5'-P-CCNC: 152 U/L (ref 9–39)
BACTERIA BLD AEROBE CULT: ABNORMAL
BACTERIA BLD AEROBE CULT: ABNORMAL
BACTERIA BLD CULT: ABNORMAL
BACTERIA BLD CULT: ABNORMAL
BASE EXCESS BLDA CALC-SCNC: 0.5 MMOL/L (ref -2–3)
BASE EXCESS BLDA CALC-SCNC: 1 MMOL/L (ref -2–3)
BASE EXCESS BLDA CALC-SCNC: 1.2 MMOL/L (ref -2–3)
BASE EXCESS BLDA CALC-SCNC: 1.8 MMOL/L (ref -2–3)
BASE EXCESS BLDA CALC-SCNC: 2.4 MMOL/L (ref -2–3)
BASE EXCESS BLDA CALC-SCNC: 3.2 MMOL/L (ref -2–3)
BILIRUB DIRECT SERPL-MCNC: 25.8 MG/DL (ref 0–0.3)
BILIRUB SERPL-MCNC: 24.2 MG/DL (ref 0–1.2)
BLOOD EXPIRATION DATE: NORMAL
BODY TEMPERATURE: 37 DEGREES CELSIUS
BUN SERPL-MCNC: 14 MG/DL (ref 6–23)
BUN SERPL-MCNC: 22 MG/DL (ref 6–23)
BUN SERPL-MCNC: 22 MG/DL (ref 6–23)
BUN SERPL-MCNC: 29 MG/DL (ref 6–23)
CA-I BLD-SCNC: 1.06 MMOL/L (ref 1.1–1.33)
CA-I BLD-SCNC: 1.07 MMOL/L (ref 1.1–1.33)
CA-I BLDA-SCNC: 1.08 MMOL/L (ref 1.1–1.33)
CA-I BLDA-SCNC: 1.08 MMOL/L (ref 1.1–1.33)
CA-I BLDA-SCNC: 1.11 MMOL/L (ref 1.1–1.33)
CA-I BLDA-SCNC: 1.11 MMOL/L (ref 1.1–1.33)
CA-I BLDA-SCNC: 1.13 MMOL/L (ref 1.1–1.33)
CA-I BLDA-SCNC: 1.13 MMOL/L (ref 1.1–1.33)
CALCIUM SERPL-MCNC: 7.6 MG/DL (ref 8.6–10.6)
CALCIUM SERPL-MCNC: 7.9 MG/DL (ref 8.6–10.6)
CARBA5 NEG: ABNORMAL
CHLORIDE BLDA-SCNC: 97 MMOL/L (ref 98–107)
CHLORIDE BLDA-SCNC: 98 MMOL/L (ref 98–107)
CHLORIDE BLDA-SCNC: 99 MMOL/L (ref 98–107)
CHLORIDE SERPL-SCNC: 96 MMOL/L (ref 98–107)
CHLORIDE SERPL-SCNC: 96 MMOL/L (ref 98–107)
CHLORIDE SERPL-SCNC: 97 MMOL/L (ref 98–107)
CHLORIDE SERPL-SCNC: 97 MMOL/L (ref 98–107)
CO2 SERPL-SCNC: 25 MMOL/L (ref 21–32)
CREAT SERPL-MCNC: 0.67 MG/DL (ref 0.5–1.3)
CREAT SERPL-MCNC: 0.96 MG/DL (ref 0.5–1.3)
CREAT SERPL-MCNC: 0.97 MG/DL (ref 0.5–1.3)
CREAT SERPL-MCNC: 1.17 MG/DL (ref 0.5–1.3)
DISPENSE STATUS: NORMAL
EGFRCR SERPLBLD CKD-EPI 2021: 70 ML/MIN/1.73M*2
EGFRCR SERPLBLD CKD-EPI 2021: 88 ML/MIN/1.73M*2
EGFRCR SERPLBLD CKD-EPI 2021: 89 ML/MIN/1.73M*2
EGFRCR SERPLBLD CKD-EPI 2021: >90 ML/MIN/1.73M*2
ERYTHROCYTE [DISTWIDTH] IN BLOOD BY AUTOMATED COUNT: 19.1 % (ref 11.5–14.5)
ERYTHROCYTE [DISTWIDTH] IN BLOOD BY AUTOMATED COUNT: 19.2 % (ref 11.5–14.5)
GLUCOSE BLD MANUAL STRIP-MCNC: 137 MG/DL (ref 74–99)
GLUCOSE BLD MANUAL STRIP-MCNC: 160 MG/DL (ref 74–99)
GLUCOSE BLD MANUAL STRIP-MCNC: 191 MG/DL (ref 74–99)
GLUCOSE BLD MANUAL STRIP-MCNC: 227 MG/DL (ref 74–99)
GLUCOSE BLD MANUAL STRIP-MCNC: 240 MG/DL (ref 74–99)
GLUCOSE BLD MANUAL STRIP-MCNC: 259 MG/DL (ref 74–99)
GLUCOSE BLDA-MCNC: 178 MG/DL (ref 74–99)
GLUCOSE BLDA-MCNC: 224 MG/DL (ref 74–99)
GLUCOSE BLDA-MCNC: 229 MG/DL (ref 74–99)
GLUCOSE BLDA-MCNC: 231 MG/DL (ref 74–99)
GLUCOSE BLDA-MCNC: 241 MG/DL (ref 74–99)
GLUCOSE BLDA-MCNC: 261 MG/DL (ref 74–99)
GLUCOSE SERPL-MCNC: 171 MG/DL (ref 74–99)
GLUCOSE SERPL-MCNC: 239 MG/DL (ref 74–99)
GLUCOSE SERPL-MCNC: 240 MG/DL (ref 74–99)
GLUCOSE SERPL-MCNC: 260 MG/DL (ref 74–99)
GRAM STN SPEC: ABNORMAL
GRAM STN SPEC: ABNORMAL
HCO3 BLDA-SCNC: 24.3 MMOL/L (ref 22–26)
HCO3 BLDA-SCNC: 24.7 MMOL/L (ref 22–26)
HCO3 BLDA-SCNC: 25.1 MMOL/L (ref 22–26)
HCO3 BLDA-SCNC: 25.6 MMOL/L (ref 22–26)
HCO3 BLDA-SCNC: 26.3 MMOL/L (ref 22–26)
HCO3 BLDA-SCNC: 26.8 MMOL/L (ref 22–26)
HCT VFR BLD AUTO: 28.3 % (ref 41–52)
HCT VFR BLD AUTO: 28.4 % (ref 41–52)
HCT VFR BLD EST: 30 % (ref 41–52)
HCT VFR BLD EST: 30 % (ref 41–52)
HCT VFR BLD EST: 31 % (ref 41–52)
HCT VFR BLD EST: 32 % (ref 41–52)
HGB BLD-MCNC: 10.1 G/DL (ref 13.5–17.5)
HGB BLD-MCNC: 9.8 G/DL (ref 13.5–17.5)
HGB BLDA-MCNC: 10 G/DL (ref 13.5–17.5)
HGB BLDA-MCNC: 10.2 G/DL (ref 13.5–17.5)
HGB BLDA-MCNC: 10.5 G/DL (ref 13.5–17.5)
HGB BLDA-MCNC: 9.9 G/DL (ref 13.5–17.5)
INHALED O2 CONCENTRATION: 40 %
INHALED O2 CONCENTRATION: 50 %
INHALED O2 CONCENTRATION: 60 %
INR PPP: 1.2 (ref 0.9–1.1)
LACTATE BLDA-SCNC: 1.2 MMOL/L (ref 0.4–2)
LACTATE BLDA-SCNC: 1.5 MMOL/L (ref 0.4–2)
LACTATE BLDA-SCNC: 1.6 MMOL/L (ref 0.4–2)
LACTATE BLDA-SCNC: 1.6 MMOL/L (ref 0.4–2)
LACTATE BLDA-SCNC: 1.7 MMOL/L (ref 0.4–2)
LACTATE BLDA-SCNC: 2.7 MMOL/L (ref 0.4–2)
MAGNESIUM SERPL-MCNC: 2.02 MG/DL (ref 1.6–2.4)
MAGNESIUM SERPL-MCNC: 2.09 MG/DL (ref 1.6–2.4)
MAGNESIUM SERPL-MCNC: 2.17 MG/DL (ref 1.6–2.4)
MCH RBC QN AUTO: 28.1 PG (ref 26–34)
MCH RBC QN AUTO: 29.4 PG (ref 26–34)
MCHC RBC AUTO-ENTMCNC: 34.5 G/DL (ref 32–36)
MCHC RBC AUTO-ENTMCNC: 35.7 G/DL (ref 32–36)
MCV RBC AUTO: 81 FL (ref 80–100)
MCV RBC AUTO: 82 FL (ref 80–100)
NRBC BLD-RTO: 3.9 /100 WBCS (ref 0–0)
NRBC BLD-RTO: 4.9 /100 WBCS (ref 0–0)
OXYHGB MFR BLDA: 92 % (ref 94–98)
OXYHGB MFR BLDA: 92.5 % (ref 94–98)
OXYHGB MFR BLDA: 94.9 % (ref 94–98)
OXYHGB MFR BLDA: 95.9 % (ref 94–98)
OXYHGB MFR BLDA: 96 % (ref 94–98)
OXYHGB MFR BLDA: 96.7 % (ref 94–98)
PCO2 BLDA: 34 MM HG (ref 38–42)
PCO2 BLDA: 35 MM HG (ref 38–42)
PCO2 BLDA: 36 MM HG (ref 38–42)
PCO2 BLDA: 36 MM HG (ref 38–42)
PCO2 BLDA: 37 MM HG (ref 38–42)
PCO2 BLDA: 37 MM HG (ref 38–42)
PH BLDA: 7.44 PH (ref 7.38–7.42)
PH BLDA: 7.45 PH (ref 7.38–7.42)
PH BLDA: 7.46 PH (ref 7.38–7.42)
PH BLDA: 7.46 PH (ref 7.38–7.42)
PH BLDA: 7.47 PH (ref 7.38–7.42)
PH BLDA: 7.48 PH (ref 7.38–7.42)
PHOSPHATE SERPL-MCNC: 1 MG/DL (ref 2.5–4.9)
PHOSPHATE SERPL-MCNC: 1.1 MG/DL (ref 2.5–4.9)
PHOSPHATE SERPL-MCNC: 1.7 MG/DL (ref 2.5–4.9)
PLATELET # BLD AUTO: 52 X10*3/UL (ref 150–450)
PLATELET # BLD AUTO: 53 X10*3/UL (ref 150–450)
PO2 BLDA: 122 MM HG (ref 85–95)
PO2 BLDA: 136 MM HG (ref 85–95)
PO2 BLDA: 64 MM HG (ref 85–95)
PO2 BLDA: 67 MM HG (ref 85–95)
PO2 BLDA: 78 MM HG (ref 85–95)
PO2 BLDA: 86 MM HG (ref 85–95)
POTASSIUM BLDA-SCNC: 3.3 MMOL/L (ref 3.5–5.3)
POTASSIUM BLDA-SCNC: 3.5 MMOL/L (ref 3.5–5.3)
POTASSIUM BLDA-SCNC: 3.6 MMOL/L (ref 3.5–5.3)
POTASSIUM BLDA-SCNC: 3.7 MMOL/L (ref 3.5–5.3)
POTASSIUM BLDA-SCNC: 3.7 MMOL/L (ref 3.5–5.3)
POTASSIUM BLDA-SCNC: 3.8 MMOL/L (ref 3.5–5.3)
POTASSIUM SERPL-SCNC: 3.4 MMOL/L (ref 3.5–5.3)
POTASSIUM SERPL-SCNC: 3.6 MMOL/L (ref 3.5–5.3)
POTASSIUM SERPL-SCNC: 3.6 MMOL/L (ref 3.5–5.3)
POTASSIUM SERPL-SCNC: 3.9 MMOL/L (ref 3.5–5.3)
PRODUCT BLOOD TYPE: 5100
PRODUCT CODE: NORMAL
PROT SERPL-MCNC: 4.8 G/DL (ref 6.4–8.2)
PROTHROMBIN TIME: 13.2 SECONDS (ref 9.8–12.8)
RBC # BLD AUTO: 3.44 X10*6/UL (ref 4.5–5.9)
RBC # BLD AUTO: 3.49 X10*6/UL (ref 4.5–5.9)
RH FACTOR (ANTIGEN D): NORMAL
SAO2 % BLDA: 100 % (ref 94–100)
SAO2 % BLDA: 95 % (ref 94–100)
SAO2 % BLDA: 96 % (ref 94–100)
SAO2 % BLDA: 98 % (ref 94–100)
SAO2 % BLDA: 99 % (ref 94–100)
SAO2 % BLDA: 99 % (ref 94–100)
SODIUM BLDA-SCNC: 129 MMOL/L (ref 136–145)
SODIUM BLDA-SCNC: 129 MMOL/L (ref 136–145)
SODIUM BLDA-SCNC: 130 MMOL/L (ref 136–145)
SODIUM SERPL-SCNC: 131 MMOL/L (ref 136–145)
SODIUM SERPL-SCNC: 131 MMOL/L (ref 136–145)
SODIUM SERPL-SCNC: 132 MMOL/L (ref 136–145)
SODIUM SERPL-SCNC: 133 MMOL/L (ref 136–145)
UNIT ABO: NORMAL
UNIT NUMBER: NORMAL
UNIT RH: NORMAL
UNIT VOLUME: 287
UNIT VOLUME: 350
VANCOMYCIN SERPL-MCNC: 14.5 UG/ML (ref 5–20)
WBC # BLD AUTO: 0.8 X10*3/UL (ref 4.4–11.3)
WBC # BLD AUTO: 1.4 X10*3/UL (ref 4.4–11.3)
XM INTEP: NORMAL

## 2024-04-06 PROCEDURE — 99232 SBSQ HOSP IP/OBS MODERATE 35: CPT | Performed by: PHYSICIAN ASSISTANT

## 2024-04-06 PROCEDURE — 2500000005 HC RX 250 GENERAL PHARMACY W/O HCPCS: Performed by: STUDENT IN AN ORGANIZED HEALTH CARE EDUCATION/TRAINING PROGRAM

## 2024-04-06 PROCEDURE — 82330 ASSAY OF CALCIUM: CPT | Performed by: STUDENT IN AN ORGANIZED HEALTH CARE EDUCATION/TRAINING PROGRAM

## 2024-04-06 PROCEDURE — 2020000001 HC ICU ROOM DAILY

## 2024-04-06 PROCEDURE — 2500000004 HC RX 250 GENERAL PHARMACY W/ HCPCS (ALT 636 FOR OP/ED): Performed by: NURSE PRACTITIONER

## 2024-04-06 PROCEDURE — 2500000002 HC RX 250 W HCPCS SELF ADMINISTERED DRUGS (ALT 637 FOR MEDICARE OP, ALT 636 FOR OP/ED): Performed by: NURSE PRACTITIONER

## 2024-04-06 PROCEDURE — 83735 ASSAY OF MAGNESIUM: CPT | Performed by: STUDENT IN AN ORGANIZED HEALTH CARE EDUCATION/TRAINING PROGRAM

## 2024-04-06 PROCEDURE — 82947 ASSAY GLUCOSE BLOOD QUANT: CPT

## 2024-04-06 PROCEDURE — 2500000004 HC RX 250 GENERAL PHARMACY W/ HCPCS (ALT 636 FOR OP/ED): Performed by: STUDENT IN AN ORGANIZED HEALTH CARE EDUCATION/TRAINING PROGRAM

## 2024-04-06 PROCEDURE — 2500000001 HC RX 250 WO HCPCS SELF ADMINISTERED DRUGS (ALT 637 FOR MEDICARE OP): Performed by: NURSE PRACTITIONER

## 2024-04-06 PROCEDURE — 37799 UNLISTED PX VASCULAR SURGERY: CPT | Performed by: STUDENT IN AN ORGANIZED HEALTH CARE EDUCATION/TRAINING PROGRAM

## 2024-04-06 PROCEDURE — 99233 SBSQ HOSP IP/OBS HIGH 50: CPT | Performed by: INTERNAL MEDICINE

## 2024-04-06 PROCEDURE — 83735 ASSAY OF MAGNESIUM: CPT

## 2024-04-06 PROCEDURE — 85027 COMPLETE CBC AUTOMATED: CPT

## 2024-04-06 PROCEDURE — 99291 CRITICAL CARE FIRST HOUR: CPT | Performed by: STUDENT IN AN ORGANIZED HEALTH CARE EDUCATION/TRAINING PROGRAM

## 2024-04-06 PROCEDURE — 84132 ASSAY OF SERUM POTASSIUM: CPT

## 2024-04-06 PROCEDURE — 84100 ASSAY OF PHOSPHORUS: CPT

## 2024-04-06 PROCEDURE — 2500000002 HC RX 250 W HCPCS SELF ADMINISTERED DRUGS (ALT 637 FOR MEDICARE OP, ALT 636 FOR OP/ED): Performed by: STUDENT IN AN ORGANIZED HEALTH CARE EDUCATION/TRAINING PROGRAM

## 2024-04-06 PROCEDURE — 82248 BILIRUBIN DIRECT: CPT

## 2024-04-06 PROCEDURE — C9113 INJ PANTOPRAZOLE SODIUM, VIA: HCPCS | Performed by: NURSE PRACTITIONER

## 2024-04-06 PROCEDURE — 71045 X-RAY EXAM CHEST 1 VIEW: CPT | Performed by: RADIOLOGY

## 2024-04-06 PROCEDURE — 85610 PROTHROMBIN TIME: CPT

## 2024-04-06 PROCEDURE — 71045 X-RAY EXAM CHEST 1 VIEW: CPT

## 2024-04-06 PROCEDURE — 94762 N-INVAS EAR/PLS OXIMTRY CONT: CPT

## 2024-04-06 PROCEDURE — 86901 BLOOD TYPING SEROLOGIC RH(D): CPT | Performed by: STUDENT IN AN ORGANIZED HEALTH CARE EDUCATION/TRAINING PROGRAM

## 2024-04-06 PROCEDURE — 2500000004 HC RX 250 GENERAL PHARMACY W/ HCPCS (ALT 636 FOR OP/ED)

## 2024-04-06 PROCEDURE — 94003 VENT MGMT INPAT SUBQ DAY: CPT

## 2024-04-06 PROCEDURE — 80202 ASSAY OF VANCOMYCIN: CPT

## 2024-04-06 PROCEDURE — 84132 ASSAY OF SERUM POTASSIUM: CPT | Performed by: STUDENT IN AN ORGANIZED HEALTH CARE EDUCATION/TRAINING PROGRAM

## 2024-04-06 PROCEDURE — 2500000004 HC RX 250 GENERAL PHARMACY W/ HCPCS (ALT 636 FOR OP/ED): Mod: JZ | Performed by: NURSE PRACTITIONER

## 2024-04-06 RX ORDER — SODIUM CHLORIDE, SODIUM LACTATE, POTASSIUM CHLORIDE, CALCIUM CHLORIDE 600; 310; 30; 20 MG/100ML; MG/100ML; MG/100ML; MG/100ML
5 INJECTION, SOLUTION INTRAVENOUS CONTINUOUS
Status: DISCONTINUED | OUTPATIENT
Start: 2024-04-06 | End: 2024-04-26

## 2024-04-06 RX ORDER — HEPARIN SODIUM 5000 [USP'U]/ML
5000 INJECTION, SOLUTION INTRAVENOUS; SUBCUTANEOUS EVERY 8 HOURS
Status: COMPLETED | OUTPATIENT
Start: 2024-04-06 | End: 2024-04-08

## 2024-04-06 RX ORDER — CALCIUM GLUCONATE 20 MG/ML
1 INJECTION, SOLUTION INTRAVENOUS EVERY 4 HOURS
Status: DISCONTINUED | OUTPATIENT
Start: 2024-04-06 | End: 2024-04-06

## 2024-04-06 RX ORDER — INSULIN GLARGINE 100 [IU]/ML
16 INJECTION, SOLUTION SUBCUTANEOUS EVERY 12 HOURS
Status: DISCONTINUED | OUTPATIENT
Start: 2024-04-06 | End: 2024-04-08

## 2024-04-06 RX ADMIN — INSULIN LISPRO 12 UNITS: 100 INJECTION, SOLUTION INTRAVENOUS; SUBCUTANEOUS at 21:05

## 2024-04-06 RX ADMIN — INSULIN GLARGINE 10 UNITS: 100 INJECTION, SOLUTION SUBCUTANEOUS at 08:29

## 2024-04-06 RX ADMIN — FLUDROCORTISONE ACETATE 0.1 MG: 0.1 TABLET ORAL at 12:14

## 2024-04-06 RX ADMIN — INSULIN GLARGINE 16 UNITS: 100 INJECTION, SOLUTION SUBCUTANEOUS at 21:05

## 2024-04-06 RX ADMIN — INSULIN LISPRO 12 UNITS: 100 INJECTION, SOLUTION INTRAVENOUS; SUBCUTANEOUS at 23:31

## 2024-04-06 RX ADMIN — PANTOPRAZOLE SODIUM 40 MG: 40 INJECTION, POWDER, FOR SOLUTION INTRAVENOUS at 08:31

## 2024-04-06 RX ADMIN — HYDROCORTISONE SODIUM SUCCINATE 50 MG: 100 INJECTION, POWDER, FOR SOLUTION INTRAMUSCULAR; INTRAVENOUS at 03:28

## 2024-04-06 RX ADMIN — INSULIN LISPRO 4 UNITS: 100 INJECTION, SOLUTION INTRAVENOUS; SUBCUTANEOUS at 01:00

## 2024-04-06 RX ADMIN — HYDROCORTISONE SODIUM SUCCINATE 50 MG: 100 INJECTION, POWDER, FOR SOLUTION INTRAMUSCULAR; INTRAVENOUS at 22:20

## 2024-04-06 RX ADMIN — MICAFUNGIN SODIUM 100 MG: 100 INJECTION, POWDER, LYOPHILIZED, FOR SOLUTION INTRAVENOUS at 03:28

## 2024-04-06 RX ADMIN — HYDROCORTISONE SODIUM SUCCINATE 50 MG: 100 INJECTION, POWDER, FOR SOLUTION INTRAMUSCULAR; INTRAVENOUS at 10:43

## 2024-04-06 RX ADMIN — CALCIUM GLUCONATE 1 G: 20 INJECTION, SOLUTION INTRAVENOUS at 13:06

## 2024-04-06 RX ADMIN — PANTOPRAZOLE SODIUM 40 MG: 40 INJECTION, POWDER, FOR SOLUTION INTRAVENOUS at 21:05

## 2024-04-06 RX ADMIN — MEROPENEM 1 G: 1 INJECTION, POWDER, FOR SOLUTION INTRAVENOUS at 08:31

## 2024-04-06 RX ADMIN — INSULIN LISPRO 8 UNITS: 100 INJECTION, SOLUTION INTRAVENOUS; SUBCUTANEOUS at 16:33

## 2024-04-06 RX ADMIN — INSULIN LISPRO 8 UNITS: 100 INJECTION, SOLUTION INTRAVENOUS; SUBCUTANEOUS at 11:50

## 2024-04-06 RX ADMIN — SODIUM CHLORIDE, POTASSIUM CHLORIDE, SODIUM LACTATE AND CALCIUM CHLORIDE 5 ML/HR: 600; 310; 30; 20 INJECTION, SOLUTION INTRAVENOUS at 11:00

## 2024-04-06 RX ADMIN — MIDODRINE HYDROCHLORIDE 20 MG: 10 TABLET ORAL at 09:54

## 2024-04-06 RX ADMIN — MIDODRINE HYDROCHLORIDE 20 MG: 10 TABLET ORAL at 01:04

## 2024-04-06 RX ADMIN — HEPARIN SODIUM 5000 UNITS: 5000 INJECTION INTRAVENOUS; SUBCUTANEOUS at 11:50

## 2024-04-06 RX ADMIN — INSULIN LISPRO 4 UNITS: 100 INJECTION, SOLUTION INTRAVENOUS; SUBCUTANEOUS at 08:28

## 2024-04-06 RX ADMIN — MIDODRINE HYDROCHLORIDE 20 MG: 10 TABLET ORAL at 18:23

## 2024-04-06 RX ADMIN — FLUDROCORTISONE ACETATE 0.1 MG: 0.1 TABLET ORAL at 02:56

## 2024-04-06 RX ADMIN — HEPARIN SODIUM 5000 UNITS: 5000 INJECTION INTRAVENOUS; SUBCUTANEOUS at 18:23

## 2024-04-06 RX ADMIN — POTASSIUM PHOSPHATE, MONOBASIC POTASSIUM PHOSPHATE, DIBASIC 21 MMOL: 224; 236 INJECTION, SOLUTION, CONCENTRATE INTRAVENOUS at 09:54

## 2024-04-06 RX ADMIN — MEROPENEM 1 G: 1 INJECTION, POWDER, FOR SOLUTION INTRAVENOUS at 21:05

## 2024-04-06 RX ADMIN — CALCIUM GLUCONATE 1 G: 20 INJECTION, SOLUTION INTRAVENOUS at 06:37

## 2024-04-06 RX ADMIN — SODIUM PHOSPHATE, MONOBASIC, MONOHYDRATE AND SODIUM PHOSPHATE, DIBASIC, ANHYDROUS 21 MMOL: 142; 276 INJECTION, SOLUTION INTRAVENOUS at 19:15

## 2024-04-06 ASSESSMENT — PAIN - FUNCTIONAL ASSESSMENT
PAIN_FUNCTIONAL_ASSESSMENT: CPOT (CRITICAL CARE PAIN OBSERVATION TOOL)
PAIN_FUNCTIONAL_ASSESSMENT: CPOT (CRITICAL CARE PAIN OBSERVATION TOOL)
PAIN_FUNCTIONAL_ASSESSMENT: 0-10
PAIN_FUNCTIONAL_ASSESSMENT: 0-10
PAIN_FUNCTIONAL_ASSESSMENT: CPOT (CRITICAL CARE PAIN OBSERVATION TOOL)
PAIN_FUNCTIONAL_ASSESSMENT: CPOT (CRITICAL CARE PAIN OBSERVATION TOOL)

## 2024-04-06 ASSESSMENT — PAIN SCALES - GENERAL
PAINLEVEL_OUTOF10: 0 - NO PAIN

## 2024-04-06 NOTE — PROGRESS NOTES
"Jason Hollins is a 63 y.o. male on day 32 of admission presenting with Soft tissue sarcoma (CMS/HCC).    Subjective   Overnight, patient tolerated SLED with 2L fluid off. Off pressors since yesterday evening. This AM he is alert, responsive to commands. Denies significant pain.        Objective     Physical Exam  Constitutional:       General: He is not in acute distress.  HENT:      Head:      Comments: Trach in place without surrounding erythema or bleeding  Eyes:      Extraocular Movements: Extraocular movements intact.   Cardiovascular:      Rate and Rhythm: Normal rate and regular rhythm.   Pulmonary:      Comments: Diffuse bilateral crackles auscultated bilaterally  Abdominal:      Comments: Soft, nontender, nondistended, PEG tube site is clean   Musculoskeletal:      Comments: Able to move all distal extremities except right lower extremity (consistent with prior)   Skin:     General: Skin is warm.   Neurological:      Mental Status: He is alert.      Comments: Alert and able to respond to commands with head nod.  Moves all extremities except right lower extremity (consistent with prior exam).         Last Recorded Vitals  Blood pressure 122/55, pulse 83, temperature 36.2 °C (97.2 °F), temperature source Temporal, resp. rate 24, height 1.778 m (5' 10\"), weight 125 kg (275 lb 9.2 oz), SpO2 97 %.  Intake/Output last 3 Shifts:  I/O last 3 completed shifts:  In: 3983.6 (31.9 mL/kg) [I.V.:754.6 (6 mL/kg); Blood:954; Other:170; NG/GT:1105; IV Piggyback:1000]  Out: 4730 (37.8 mL/kg) [Drains:230; Other:4000; Stool:500]  Weight: 125 kg     Relevant Results  Scheduled medications  calcium gluconate, 1 g, intravenous, q4h  fludrocortisone, 0.1 mg, nasogastric tube, q12h  hydrocortisone sodium succinate, 50 mg, intravenous, q8h  insulin glargine, 10 Units, subcutaneous, q12h  insulin lispro, 0-20 Units, subcutaneous, q4h  meropenem, 1 g, intravenous, q12h  micafungin, 100 mg, intravenous, q24h  midodrine, 20 mg, orogastric " tube, q8h  pantoprazole, 40 mg, intravenous, BID  potassium phosphate, 21 mmol, intravenous, Once      Continuous medications  norepinephrine, 0.01-0.2 mcg/kg/min (Dosing Weight), Last Rate: Stopped (04/05/24 1930)      PRN medications  PRN medications: alteplase, calcium gluconate, calcium gluconate, dextrose **OR** glucagon, magnesium sulfate, magnesium sulfate, oxygen, sodium chloride, vancomycin  Results for orders placed or performed during the hospital encounter of 03/05/24 (from the past 24 hour(s))   CALCIUM, IONIZED   Result Value Ref Range    POCT Calcium, Ionized 1.09 (L) 1.1 - 1.33 mmol/L   CBC   Result Value Ref Range    WBC 2.8 (L) 4.4 - 11.3 x10*3/uL    nRBC 1.4 (H) 0.0 - 0.0 /100 WBCs    RBC 3.30 (L) 4.50 - 5.90 x10*6/uL    Hemoglobin 9.7 (L) 13.5 - 17.5 g/dL    Hematocrit 27.7 (L) 41.0 - 52.0 %    MCV 84 80 - 100 fL    MCH 29.4 26.0 - 34.0 pg    MCHC 35.0 32.0 - 36.0 g/dL    RDW 18.9 (H) 11.5 - 14.5 %    Platelets 57 (L) 150 - 450 x10*3/uL   Hepatic function panel   Result Value Ref Range    Albumin 2.8 (L) 3.4 - 5.0 g/dL    Bilirubin, Total 24.6 (H) 0.0 - 1.2 mg/dL    Bilirubin, Direct 18.0 (H) 0.0 - 0.3 mg/dL    Alkaline Phosphatase 175 (H) 33 - 136 U/L    ALT 79 (H) 10 - 52 U/L     (H) 9 - 39 U/L    Total Protein 4.7 (L) 6.4 - 8.2 g/dL   Magnesium   Result Value Ref Range    Magnesium 2.45 (H) 1.60 - 2.40 mg/dL   Basic Metabolic Panel   Result Value Ref Range    Glucose 132 (H) 74 - 99 mg/dL    Sodium 132 (L) 136 - 145 mmol/L    Potassium 3.6 3.5 - 5.3 mmol/L    Chloride 96 (L) 98 - 107 mmol/L    Bicarbonate 25 21 - 32 mmol/L    Anion Gap 15 10 - 20 mmol/L    Urea Nitrogen 24 (H) 6 - 23 mg/dL    Creatinine 1.14 0.50 - 1.30 mg/dL    eGFR 73 >60 mL/min/1.73m*2    Calcium 7.8 (L) 8.6 - 10.6 mg/dL   Phosphorus   Result Value Ref Range    Phosphorus 2.1 (L) 2.5 - 4.9 mg/dL   Prepare Platelets: 1 Units   Result Value Ref Range    PRODUCT CODE [id number]     Unit Number [id number]     Unit ABO  O     Unit RH POS     Dispense Status TR     Blood Expiration Date April 05, 2024 23:59 EDT     PRODUCT BLOOD TYPE 5100     UNIT VOLUME 254    POCT GLUCOSE   Result Value Ref Range    POCT Glucose 148 (H) 74 - 99 mg/dL   POCT GLUCOSE   Result Value Ref Range    POCT Glucose 165 (H) 74 - 99 mg/dL   Blood Gas Arterial Full Panel   Result Value Ref Range    POCT pH, Arterial 7.42 7.38 - 7.42 pH    POCT pCO2, Arterial 35 (L) 38 - 42 mm Hg    POCT pO2, Arterial 102 (H) 85 - 95 mm Hg    POCT SO2, Arterial 99 94 - 100 %    POCT Oxy Hemoglobin, Arterial 96.6 94.0 - 98.0 %    POCT Hematocrit Calculated, Arterial 29.0 (L) 41.0 - 52.0 %    POCT Sodium, Arterial 130 (L) 136 - 145 mmol/L    POCT Potassium, Arterial 3.4 (L) 3.5 - 5.3 mmol/L    POCT Chloride, Arterial 97 (L) 98 - 107 mmol/L    POCT Ionized Calcium, Arterial 1.09 (L) 1.10 - 1.33 mmol/L    POCT Glucose, Arterial 180 (H) 74 - 99 mg/dL    POCT Lactate, Arterial 1.4 0.4 - 2.0 mmol/L    POCT Base Excess, Arterial -1.4 -2.0 - 3.0 mmol/L    POCT HCO3 Calculated, Arterial 22.7 22.0 - 26.0 mmol/L    POCT Hemoglobin, Arterial 9.8 (L) 13.5 - 17.5 g/dL    POCT Anion Gap, Arterial 14 10 - 25 mmo/L    Patient Temperature 37.0 degrees Celsius    FiO2 50 %   CALCIUM, IONIZED   Result Value Ref Range    POCT Calcium, Ionized 1.06 (L) 1.1 - 1.33 mmol/L   CBC   Result Value Ref Range    WBC 0.4 (LL) 4.4 - 11.3 x10*3/uL    nRBC 6.5 (H) 0.0 - 0.0 /100 WBCs    RBC 2.59 (L) 4.50 - 5.90 x10*6/uL    Hemoglobin 7.6 (L) 13.5 - 17.5 g/dL    Hematocrit 21.8 (L) 41.0 - 52.0 %    MCV 84 80 - 100 fL    MCH 29.3 26.0 - 34.0 pg    MCHC 34.9 32.0 - 36.0 g/dL    RDW 19.0 (H) 11.5 - 14.5 %    Platelets 45 (L) 150 - 450 x10*3/uL   Hepatic function panel   Result Value Ref Range    Albumin 2.9 (L) 3.4 - 5.0 g/dL    Bilirubin, Total 26.0 (H) 0.0 - 1.2 mg/dL    Bilirubin, Direct 17.7 (H) 0.0 - 0.3 mg/dL    Alkaline Phosphatase 187 (H) 33 - 136 U/L    ALT 81 (H) 10 - 52 U/L     (H) 9 - 39 U/L     Total Protein 4.9 (L) 6.4 - 8.2 g/dL   Magnesium   Result Value Ref Range    Magnesium 2.49 (H) 1.60 - 2.40 mg/dL   Basic Metabolic Panel   Result Value Ref Range    Glucose 193 (H) 74 - 99 mg/dL    Sodium 133 (L) 136 - 145 mmol/L    Potassium 3.7 3.5 - 5.3 mmol/L    Chloride 96 (L) 98 - 107 mmol/L    Bicarbonate 23 21 - 32 mmol/L    Anion Gap 18 10 - 20 mmol/L    Urea Nitrogen 34 (H) 6 - 23 mg/dL    Creatinine 1.44 (H) 0.50 - 1.30 mg/dL    eGFR 55 (L) >60 mL/min/1.73m*2    Calcium 8.0 (L) 8.6 - 10.6 mg/dL   Phosphorus   Result Value Ref Range    Phosphorus 3.0 2.5 - 4.9 mg/dL   CBC and Auto Differential   Result Value Ref Range    WBC 0.6 (LL) 4.4 - 11.3 x10*3/uL    nRBC 7.1 (H) 0.0 - 0.0 /100 WBCs    RBC 3.30 (L) 4.50 - 5.90 x10*6/uL    Hemoglobin 9.9 (L) 13.5 - 17.5 g/dL    Hematocrit 27.5 (L) 41.0 - 52.0 %    MCV 83 80 - 100 fL    MCH 30.0 26.0 - 34.0 pg    MCHC 36.0 32.0 - 36.0 g/dL    RDW 19.3 (H) 11.5 - 14.5 %    Platelets 64 (L) 150 - 450 x10*3/uL    Neutrophils % 62.4 40.0 - 80.0 %    Immature Granulocytes %, Automated 5.4 (H) 0.0 - 0.9 %    Lymphocytes % 14.3 13.0 - 44.0 %    Monocytes % 17.9 2.0 - 10.0 %    Eosinophils % 0.0 0.0 - 6.0 %    Basophils % 0.0 0.0 - 2.0 %    Neutrophils Absolute 0.35 (L) 1.20 - 7.70 x10*3/uL    Immature Granulocytes Absolute, Automated 0.03 0.00 - 0.70 x10*3/uL    Lymphocytes Absolute 0.08 (L) 1.20 - 4.80 x10*3/uL    Monocytes Absolute 0.10 0.10 - 1.00 x10*3/uL    Eosinophils Absolute 0.00 0.00 - 0.70 x10*3/uL    Basophils Absolute 0.00 0.00 - 0.10 x10*3/uL   POCT GLUCOSE   Result Value Ref Range    POCT Glucose 166 (H) 74 - 99 mg/dL   POCT GLUCOSE   Result Value Ref Range    POCT Glucose 160 (H) 74 - 99 mg/dL   POCT GLUCOSE   Result Value Ref Range    POCT Glucose 137 (H) 74 - 99 mg/dL   CALCIUM, IONIZED   Result Value Ref Range    POCT Calcium, Ionized 1.07 (L) 1.1 - 1.33 mmol/L   CBC   Result Value Ref Range    WBC 0.8 (LL) 4.4 - 11.3 x10*3/uL    nRBC 3.9 (H) 0.0 - 0.0 /100  WBCs    RBC 3.49 (L) 4.50 - 5.90 x10*6/uL    Hemoglobin 9.8 (L) 13.5 - 17.5 g/dL    Hematocrit 28.4 (L) 41.0 - 52.0 %    MCV 81 80 - 100 fL    MCH 28.1 26.0 - 34.0 pg    MCHC 34.5 32.0 - 36.0 g/dL    RDW 19.2 (H) 11.5 - 14.5 %    Platelets 52 (L) 150 - 450 x10*3/uL   Coagulation Screen   Result Value Ref Range    Protime 13.2 (H) 9.8 - 12.8 seconds    INR 1.2 (H) 0.9 - 1.1    aPTT 28 27 - 38 seconds   Hepatic function panel   Result Value Ref Range    Albumin 2.7 (L) 3.4 - 5.0 g/dL    Bilirubin, Total 24.2 (H) 0.0 - 1.2 mg/dL    Bilirubin, Direct 25.8 (H) 0.0 - 0.3 mg/dL    Alkaline Phosphatase 234 (H) 33 - 136 U/L    ALT 74 (H) 10 - 52 U/L     (H) 9 - 39 U/L    Total Protein 4.8 (L) 6.4 - 8.2 g/dL   Magnesium   Result Value Ref Range    Magnesium 2.02 1.60 - 2.40 mg/dL   Blood Gas Arterial Full Panel   Result Value Ref Range    POCT pH, Arterial 7.48 (H) 7.38 - 7.42 pH    POCT pCO2, Arterial 36 (L) 38 - 42 mm Hg    POCT pO2, Arterial 86 85 - 95 mm Hg    POCT SO2, Arterial 99 94 - 100 %    POCT Oxy Hemoglobin, Arterial 96.0 94.0 - 98.0 %    POCT Hematocrit Calculated, Arterial 31.0 (L) 41.0 - 52.0 %    POCT Sodium, Arterial 129 (L) 136 - 145 mmol/L    POCT Potassium, Arterial 3.3 (L) 3.5 - 5.3 mmol/L    POCT Chloride, Arterial 99 98 - 107 mmol/L    POCT Ionized Calcium, Arterial 1.08 (L) 1.10 - 1.33 mmol/L    POCT Glucose, Arterial 178 (H) 74 - 99 mg/dL    POCT Lactate, Arterial 1.2 0.4 - 2.0 mmol/L    POCT Base Excess, Arterial 3.2 (H) -2.0 - 3.0 mmol/L    POCT HCO3 Calculated, Arterial 26.8 (H) 22.0 - 26.0 mmol/L    POCT Hemoglobin, Arterial 10.2 (L) 13.5 - 17.5 g/dL    POCT Anion Gap, Arterial 7 (L) 10 - 25 mmo/L    Patient Temperature 37.0 degrees Celsius    FiO2 50 %   Basic Metabolic Panel   Result Value Ref Range    Glucose 171 (H) 74 - 99 mg/dL    Sodium 133 (L) 136 - 145 mmol/L    Potassium 3.4 (L) 3.5 - 5.3 mmol/L    Chloride 97 (L) 98 - 107 mmol/L    Bicarbonate 25 21 - 32 mmol/L    Anion Gap 14 10  - 20 mmol/L    Urea Nitrogen 14 6 - 23 mg/dL    Creatinine 0.67 0.50 - 1.30 mg/dL    eGFR >90 >60 mL/min/1.73m*2    Calcium 7.6 (L) 8.6 - 10.6 mg/dL   Phosphorus   Result Value Ref Range    Phosphorus 1.0 (L) 2.5 - 4.9 mg/dL   POCT GLUCOSE   Result Value Ref Range    POCT Glucose 191 (H) 74 - 99 mg/dL                  Assessment/Plan   Principal Problem:    Soft tissue sarcoma (CMS/HCC)  Active Problems:    Acute kidney injury (nontraumatic) (CMS/HCC)    Pulmonary embolus (CMS/HCC)    Anemia    Thrombocytopenia (CMS/HCC)    Extraskeletal myxoid chondrosarcoma (CMS/HCC)    Cardiopulmonary arrest (CMS/HCC)    Acute respiratory failure with hypoxia (CMS/HCC)    Tracheostomy hemorrhage (CMS/HCC)    Jason Hollins is a 63 y.o. 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 Hawkins and Dr. Smith on 3/5/24.  Post operative course was uncomplicated and patient was on RNF until 3/20 for prolonged bed rest to avoid hip flexion to maintain flap integrity.  Patient was on prophylactic lovenox while on bedrest.      3/20: Cardiopulmonary arrest s/p CPR with ROSC after TPA, overnight started on heparin, epo, increased pressor requirements, increased swelling at flap site.      3/21: Hemorrhagic shock, MTP. Firm and large right flap site. Return to OR s/p Exploration of right thigh wound, Evacuation of hematoma. Suture ligation of multiple bleeding vessel and several points in gluteal area.      4/1: s/p Trach     4/4: return OR with ENT s/p laryngoscopy, esophagoscopy and bronchoscopy, trach revision. Found extensive clot burden in esophagus and stomach as well as in the lungs. Oozing at thyroid bed cauterized. Trach exchanged to new 6.0 prox XLT elvie. OR findings:  blood up glottis and from thyroid bed to airway.     Plan:  NEURO:  History of myxoid chondrosarcoma s/p wide resection sarcoma, sciatic nerve neurolysis of right lower extremity with right gluteal reconstruction, pedicle ALT, vastus lateralus flap, pedicle gluteal and perisformis flap with Dr. Hawkins and Dr. Smith on 3/5/24 s/p cardiopulmonary arrest with ROSC 3/20. CT head 3/22 without acute process. Weaned off cisatracurium and propofol overnight 3/23-3/24. Ketamine weaned off 3/25 and Fentanyl weaned off 3/26 am. Repeat CT Head 3/26 without acute process. MRI Brain 3/30, negative for cerebral infarction or hemorrhage. Neuro exam - off sedation, following commands except for RLE, tracking, nodding/shaking head to questions  - ongoing neuro and pain assessments  - keep off sedation  - PT/OT -> AROM  - no need for soft wrist restraints at this time -> continue to reassess     CV: History of HTN, HLD. Acute cardiopulmonary arrest 2/2 to possible massive PE vs massive STEMI, received multiple rounds of CPR and one defibrillation.  Sustained ROSC achieved after TPA bolus. On high dose levophed, epinephrine, vaso, angioT2. Plan on 3/21 was for ECMO which was aborted secondary to large hemhorrge at right flap site. Had MTP and taken OR with 5L evacuated. ECHO 3/21: Normal LV, Mod enlarged RV, slight suggestion of a Townsend's sign / RV strain, low normal RV function. ECHO 3/22 with hyperdynamic LV. New onset Afib with RVR overnight 3/22-3/23, dosed with 150mg amio x2, started infusion at 1mg/min thereafter. AT2 weaned off. Amio infusion discontinued 3/25. Lactate downtrending. Vaso and epi weaned off. Remains in NSR. iEpo weaned off 3/27. Repeat TTE 3/29 and 4/2 essentially unchanged. Levo resumed 4/2 evening to continue aggressive fluid removal. Off of pressors since 4/5 evening.  - continuous EKG/abp/cvp monitoring  - Goal map range 65-90  - Continue midodrine 20mg q8h  - Continue florinef 0.1mg BID   -Decreased SDS to hydrocortisone 50 mg every 12 hours  - Holding heparin infusion  for now      PULM: Arrived to SICU intubated julio c-CPR. Concern for massive PE. Started on iEpo, currently on 0.05. Hypoxic respiratory failure. Severe ARDS. ETT exchanged 3/23. CT Chest 3/26 with interval JOHN consolidative/ground glass opacity. S/p Tracheostomy on 4/1. Bedside bronch -> some blood in trach, posterior wall tissue just distal to trach tip appears to be collapsing on cannula. Currently on SIMV 50%, 500, 18, +10, 10 PS. PIP up to 31 and Vt 375-500cc.   - Wean FiO2 as tolerated -plan to wean FiO2 to 40%.  Discussed with RT to try pressure support ventilation today at pressure support 10, PEEP 10, FiO2 40%  - ABGs prn  - additional pulm toilet prn  - daily CXR -chest x-ray today indicates mildly improved bilateral hazy opacities     ENT: Had epistaxis 3/23, completed afrin bid x3 days. S/p trach on 4/1. Bleeding from trach site 4/2 am, packing placed by ENT. Repeat episode of bloody tracheal secretions 4/3 through this am. Taken back to OR with ENT 4/4 as per above.  - TXA as above and holding heparin for now     GI: NPO. Shock liver, pancreatitis. Elevated TG. Elevated lactate. Consulted ACS for potential bowel ischemia as cause of persistent lactic acidosis. CT imaging 3/22 with evidence of pancreatitis. No acute surgical indication per ACS. RUQ US 3/22 with thickening of GB wall without cholelithiasis. CT A/P 3/26 negative for acute bleed. LFTs downtrending. Worsening hyperbilirubinemia. Amylase and lipase now WNL. Repeat RUQ US 4/1 with nonspecific gallbladder wall edema and thickening which may be 2/2 generalized fluid overload, mild hepatomegaly with slight nodular contour and c/f steatosis and fibrosis, irregular hypoechoic region adjacent to hepatic veins. US liver with doppler 4/2 revealed hepatomegaly with nodular contour, mildly elevated RI in main and left hepatic arteries. Hyperbilirubinemia  - resumed TF - abdomen is soft, nontender, nondistended today  - prostat daily  - PPI BID for GI  ppx  - Trend LFTs daily -discussed with hepatology that this is likely lab error given that direct bili is greater than total bili.  Will plan to recheck hepatic function panel tomorrow morning     : No history of renal disease. Baseline creatinine 0.6. Anuric RUTH. Elevated CK, downtrending. Metabolic acidosis, total body fluid overload, hyperkalemia. Started on CVVH 3/21, stopped bicarb infusion 3/22. Marino removed 3/24. Hyponatremia resolved. Stopped CVVH 4/2. Tolerated 2L fluid removal with SLED overnight.  - Nephrology following -recommended holding off on SLED tonight and checking afternoon BMP for electrolytes (scheduled draw at 2 PM)  - RFP BID and PRN  - Replete electrolytes judiciously  - Bladder scan pending this AM      HEME: Patient was on ppx lovenox for DVT prophylaxis prior to cardiopulmonary arrest. Concern for massive PE patient received bolus of TPA during CPR. Started on heparin infusion overnight given concern for PE. Hemorrhagic shock 3/21, MTP and back to OR s/p Exploration of right thigh woundEvacuation of hematoma. Suture ligation of multiple bleeding vessel and several points in gluteal area. Hematology consulted 3/22 to r/o HLH. Transfused 1 RBC overnight 3/22-3/23. Thrombocytopenia, coagulapathy, hypofibrinogenemia. LE Duplex 3/25 +acute occlusive R soleal DVT. Received 2u PRBC and 1u FFP on 3/26. Heparin infusion discontinued 3/26 to r/o HIT and transitioned to bival. PF4 negative, resumed heparin infuision 3/27. Elevated IL2R and decreased NK function. C/f HLH (low suspicion), empirically treated with decadron (3/28-3/31). Thrombocytopenia on 3/30 to 18k, received 5pk, did not increment. Heparin held. Thrombocytopenia likely 2/2 to consumptive process, hematology following. Received IVIG and 5pk plts 3/31. Pancytopenia persists, improving thrombocytopenia  - cbc, coags bid and prn  - Given stable hemoglobin over the last 2 days -starting subcutaneous heparin for DVT prophylaxis  today  - Hematology following, appreciate recs -> maintain Plt count >35k  - ongoing monitoring for s/s bleeding  - close surveillance of RLE and drains  - Vasc Med signed off 3/27  - maintain active T&S (4/3)     ENDO: History of DM2. A1c 7.5%. TSH WNL 1/2024. Hyperglycemia  - continue lantus 10u q12h  - q4h accuchecks and SSI #4     ID: Leukocytosis resolved, now with leukopenia. On broad antimicrobial therapy. MRSA screen + 2/28/24. Pan cultures sent 3/22. Sputum NTF, +MRSA screen (completed 5 day course mupirocin), UCx and BCx negative. Completed 7 day course vanc/zosyn 3/27. Febrile to 39.1 4/3, resent blood cultures and BAL and started vanco and zosyn. Switched zosyn to reynaldo, kept on vanco, added john. Blood cultures and sputum with Klebsiella. Repeat blood cultures sent 4/4.  - F/U cultures  - temp q4h, wbc daily  - continue reynaldo and micafungin. Per ID if no yeast in blood cultures by 4/7 will dc   - ongoing monitoring for s/s infection  - ID consult - rec continuing antibiotics  - plastics team noted some darkened discoloration of the native tissue at the R posterior hip incision that is c/f necrosis that the surgeon would like to ideally debride and close on 4/9 if patient is medically stable     Lines:  - right radial a line (4/5)  - RIJ trialysis (3/27)  - PIV x2     Family: Spoke to sister at bedside today    Dispo: Continue ICU care. Patient seen and discussed with ICU attending Dr. Nayan Dailey, PGY-1  SICU team

## 2024-04-06 NOTE — PROGRESS NOTES
Vancomycin Dosing by Pharmacy- Cessation of Therapy    Consult to pharmacy for vancomycin dosing has been discontinued by the prescriber, pharmacy will sign off at this time.    Please call pharmacy if there are further questions or re-enter a consult if vancomycin is resumed.     Debbie Pena, PharmD

## 2024-04-06 NOTE — CARE PLAN
The patient's goals for the shift include      The clinical goals for the shift include Pt will work with PT this shift    Over the shift, the patient did not make progress toward the following goals. Barriers to progression include ***. Recommendations to address these barriers include ***.    Problem: Diabetes  Goal: Achieve decreasing blood glucose levels by end of shift  Outcome: Not Progressing  Goal: Increase stability of blood glucose readings by end of shift  Outcome: Not Progressing     Problem: Pain  Goal: Turns in bed with improved pain control throughout the shift  Outcome: Not Progressing  Goal: Walks with improved pain control throughout the shift  Outcome: Not Progressing  Goal: Performs ADL's with improved pain control throughout shift  Outcome: Not Progressing  Goal: Participates in PT with improved pain control throughout the shift  Outcome: Not Progressing

## 2024-04-06 NOTE — PROGRESS NOTES
Department of Plastic and Reconstructive Surgery  Daily Progress Note    Patient Name: Jason Hollins  MRN: [id number]  Date:  04/06/24     Subjective   Remains critically ill in ICU, currently off pressors. Responsive to commands. Incisional wound vac intact to posterior R thigh, without issue overnight. Pt family members at bedside, discussed potential return to OR early next week as clinical stability allows for debridement of darkened/discolored tissue at R posterior thigh with possible tissue advancement and closure.      Objective   Vitals:    04/06/24 1300   BP:    Pulse: 94   Resp: (!) 30   Temp:    SpO2: 95%     Physical Exam   Constitutional: Sedated, lying in pressure relieving bed in ICU, appears critically ill.   ENMT: MMM, dobhoff in R nare.  Cardiovascular: RRR on telemetry monitor.  Respiratory/Thorax: Trach to vent.  Gastrointestinal: Abdomen soft, protuberant.   Genitourinary: SLED until weaned from pressors.   Musculoskeletal: Minimal purposeful movement appreciated, exam limited as pt sedated.   Extremities: Generalized pitting edema. Right thigh edematous, but soft and compressible, no bruising or tissue induration noted. Flap recipient site and adjacent anterior R thigh incisions appear stable. Incision and native tissue overlying R posterior thigh (previously noted with maceration and darkened discoloration) dressed with intact incisional wound vac dressing, maintaining low continuous suction at -75mmHg, no alarms for leak, obstruction or malfunction, expectantly no output in collecting canister. ANDERSON drain x 3 right upper leg with dark serous outputs.  Neurological: Sedated.  Skin: Edematous, juandice, warm.     Current Medications  Scheduled medications  fludrocortisone, 0.1 mg, nasogastric tube, q12h  heparin (porcine), 5,000 Units, subcutaneous, q8h  hydrocortisone sodium succinate, 50 mg, intravenous, q12h  insulin glargine, 10 Units, subcutaneous, q12h  insulin lispro, 0-20 Units, subcutaneous,  q4h  meropenem, 1 g, intravenous, q12h  micafungin, 100 mg, intravenous, q24h  midodrine, 20 mg, orogastric tube, q8h  pantoprazole, 40 mg, intravenous, BID  potassium phosphate, 21 mmol, intravenous, Once  sodium phosphate, 30 mmol, intravenous, Once    Continuous medications  lactated Ringer's, 5 mL/hr, Last Rate: 5 mL/hr (04/06/24 1300)    PRN medications  PRN medications: alteplase, calcium gluconate, calcium gluconate, dextrose **OR** glucagon, magnesium sulfate, magnesium sulfate, oxygen, sodium chloride     Assessment   Jason Hollins 63 y.o. 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 Hawkins and Dr. Smith on 3/5/24. Postoperative course c/b arrest requiring CPR with ROSC after TPA for assumed large PE on 3/20. Increased swelling at flap site appreciated overnight 3/20-3/21 and pt returned to OR for exploration of R flap site, evacuation of hematoma, suture ligation of multiple bleeding vessel sites in gluteal area and wound closure with Dr. Smith on 3/21. Pt has remained in [facility] for continued critical care evaluation and management postoperatively.     Plan/Recommendations  - Anticipate return to OR with plastic surgery tentatively on Tuesday 4/9 as clinical stability permits for R posterior thigh tissue I&D with possible tissue advancement and complex closure        ·   Appreciate clearance for return to OR per ICU care team   - Maintain incisional wound vac to R posterior thigh wound site per plastic surgery         ·  Settings: -75 mmHg low continuous suction        ·  Dressing to be maintained until tentative return to OR 4/9       ·  If unable to return to OR 4/9, will follow up timing of next dressing change/removal        ·  Monitor/record vac canister output [id number]       ·  Notify plastic surgery with  any concerns of leak or obstruction   - Monitor right thigh for s/s of bleeding recurrence or worsening progression, has been stable on serial assessments over past week   - Continue ANDERSON drain care to x3 drains present at R thigh flap reconstruction site      ·  Strip drain tubing TID and PRN     ·  Monitor and record output q8h      ·  Keep drain sites C/D/I      ·  Notify plastics if ANDERSON drain output exceeds > 100 ml/hr in one drain or > 300 ml/hr collectively  - Avoid pressure application or positioning onto flap site or incisions at R upper leg   - Recommend patient be positioned off of R side as able to prevent flap compression/wound breakdown   - Continue clinitron fluidized air mattress  - Appreciate remaining supportive care per ICU   - Plastic Surgery will continue to follow     Patient and plan discussed with Dr. Smith.     Emily Conroy PA-C   Plastic and Reconstructive Surgery   Available via [employer], pager: [phone] or Team phones: [phone]

## 2024-04-06 NOTE — CARE PLAN
The clinical goals for the shift include to be able continue running Tablo uninterrupted and to remove 2L fluids, maintain hemodynamic stability with none to minimal Levo gtt. , and within normal electrolytes lab results.

## 2024-04-07 ENCOUNTER — APPOINTMENT (OUTPATIENT)
Dept: RADIOLOGY | Facility: HOSPITAL | Age: 64
DRG: 003 | End: 2024-04-07
Payer: COMMERCIAL

## 2024-04-07 LAB
ALBUMIN SERPL BCP-MCNC: 2.5 G/DL (ref 3.4–5)
ALBUMIN SERPL BCP-MCNC: 2.5 G/DL (ref 3.4–5)
ALP SERPL-CCNC: 295 U/L (ref 33–136)
ALT SERPL W P-5'-P-CCNC: 63 U/L (ref 10–52)
ANION GAP BLDA CALCULATED.4IONS-SCNC: 10 MMO/L (ref 10–25)
ANION GAP BLDA CALCULATED.4IONS-SCNC: 10 MMO/L (ref 10–25)
ANION GAP BLDA CALCULATED.4IONS-SCNC: 12 MMO/L (ref 10–25)
ANION GAP BLDA CALCULATED.4IONS-SCNC: 9 MMO/L (ref 10–25)
ANION GAP SERPL CALC-SCNC: 13 MMOL/L (ref 10–20)
ANION GAP SERPL CALC-SCNC: 16 MMOL/L (ref 10–20)
APTT PPP: 28 SECONDS (ref 27–38)
AST SERPL W P-5'-P-CCNC: 164 U/L (ref 9–39)
BACTERIA SPEC RESP CULT: ABNORMAL
BACTERIA SPEC RESP CULT: ABNORMAL
BASE EXCESS BLDA CALC-SCNC: -0.4 MMOL/L (ref -2–3)
BASE EXCESS BLDA CALC-SCNC: -1.3 MMOL/L (ref -2–3)
BASE EXCESS BLDA CALC-SCNC: -1.9 MMOL/L (ref -2–3)
BASE EXCESS BLDA CALC-SCNC: 0 MMOL/L (ref -2–3)
BILIRUB DIRECT SERPL-MCNC: 18.6 MG/DL (ref 0–0.3)
BILIRUB SERPL-MCNC: 26.5 MG/DL (ref 0–1.2)
BODY TEMPERATURE: 37 DEGREES CELSIUS
BUN SERPL-MCNC: 40 MG/DL (ref 6–23)
BUN SERPL-MCNC: 51 MG/DL (ref 6–23)
CA-I BLD-SCNC: 1.08 MMOL/L (ref 1.1–1.33)
CA-I BLD-SCNC: 1.09 MMOL/L (ref 1.1–1.33)
CA-I BLDA-SCNC: 1.09 MMOL/L (ref 1.1–1.33)
CA-I BLDA-SCNC: 1.1 MMOL/L (ref 1.1–1.33)
CA-I BLDA-SCNC: 1.11 MMOL/L (ref 1.1–1.33)
CA-I BLDA-SCNC: 1.12 MMOL/L (ref 1.1–1.33)
CALCIUM SERPL-MCNC: 7.6 MG/DL (ref 8.6–10.6)
CALCIUM SERPL-MCNC: 7.8 MG/DL (ref 8.6–10.6)
CHLORIDE BLDA-SCNC: 100 MMOL/L (ref 98–107)
CHLORIDE BLDA-SCNC: 100 MMOL/L (ref 98–107)
CHLORIDE BLDA-SCNC: 98 MMOL/L (ref 98–107)
CHLORIDE BLDA-SCNC: 99 MMOL/L (ref 98–107)
CHLORIDE SERPL-SCNC: 97 MMOL/L (ref 98–107)
CHLORIDE SERPL-SCNC: 97 MMOL/L (ref 98–107)
CO2 SERPL-SCNC: 24 MMOL/L (ref 21–32)
CO2 SERPL-SCNC: 26 MMOL/L (ref 21–32)
CREAT SERPL-MCNC: 1.51 MG/DL (ref 0.5–1.3)
CREAT SERPL-MCNC: 1.79 MG/DL (ref 0.5–1.3)
EGFRCR SERPLBLD CKD-EPI 2021: 42 ML/MIN/1.73M*2
EGFRCR SERPLBLD CKD-EPI 2021: 52 ML/MIN/1.73M*2
ERYTHROCYTE [DISTWIDTH] IN BLOOD BY AUTOMATED COUNT: 19.3 % (ref 11.5–14.5)
ERYTHROCYTE [DISTWIDTH] IN BLOOD BY AUTOMATED COUNT: 19.6 % (ref 11.5–14.5)
FIBRINOGEN PPP-MCNC: 230 MG/DL (ref 200–400)
GLUCOSE BLD MANUAL STRIP-MCNC: 176 MG/DL (ref 74–99)
GLUCOSE BLD MANUAL STRIP-MCNC: 224 MG/DL (ref 74–99)
GLUCOSE BLD MANUAL STRIP-MCNC: 225 MG/DL (ref 74–99)
GLUCOSE BLD MANUAL STRIP-MCNC: 246 MG/DL (ref 74–99)
GLUCOSE BLDA-MCNC: 195 MG/DL (ref 74–99)
GLUCOSE BLDA-MCNC: 199 MG/DL (ref 74–99)
GLUCOSE BLDA-MCNC: 214 MG/DL (ref 74–99)
GLUCOSE BLDA-MCNC: 218 MG/DL (ref 74–99)
GLUCOSE SERPL-MCNC: 208 MG/DL (ref 74–99)
GLUCOSE SERPL-MCNC: 225 MG/DL (ref 74–99)
GRAM STN SPEC: ABNORMAL
GRAM STN SPEC: ABNORMAL
HCO3 BLDA-SCNC: 23.7 MMOL/L (ref 22–26)
HCO3 BLDA-SCNC: 23.7 MMOL/L (ref 22–26)
HCO3 BLDA-SCNC: 24.1 MMOL/L (ref 22–26)
HCO3 BLDA-SCNC: 24.8 MMOL/L (ref 22–26)
HCT VFR BLD AUTO: 28.1 % (ref 41–52)
HCT VFR BLD AUTO: 28.5 % (ref 41–52)
HCT VFR BLD EST: 30 % (ref 41–52)
HCT VFR BLD EST: 31 % (ref 41–52)
HGB BLD-MCNC: 10 G/DL (ref 13.5–17.5)
HGB BLD-MCNC: 9.7 G/DL (ref 13.5–17.5)
HGB BLDA-MCNC: 10 G/DL (ref 13.5–17.5)
HGB BLDA-MCNC: 10.2 G/DL (ref 13.5–17.5)
HGB BLDA-MCNC: 10.2 G/DL (ref 13.5–17.5)
HGB BLDA-MCNC: 10.4 G/DL (ref 13.5–17.5)
INHALED O2 CONCENTRATION: 40 %
INHALED O2 CONCENTRATION: 50 %
INR PPP: 1.3 (ref 0.9–1.1)
LACTATE BLDA-SCNC: 1.8 MMOL/L (ref 0.4–2)
LACTATE BLDA-SCNC: 1.9 MMOL/L (ref 0.4–2)
LACTATE BLDA-SCNC: 1.9 MMOL/L (ref 0.4–2)
LACTATE BLDA-SCNC: 2 MMOL/L (ref 0.4–2)
MAGNESIUM SERPL-MCNC: 2.23 MG/DL (ref 1.6–2.4)
MAGNESIUM SERPL-MCNC: 2.29 MG/DL (ref 1.6–2.4)
MCH RBC QN AUTO: 28.5 PG (ref 26–34)
MCH RBC QN AUTO: 28.7 PG (ref 26–34)
MCHC RBC AUTO-ENTMCNC: 34.5 G/DL (ref 32–36)
MCHC RBC AUTO-ENTMCNC: 35.1 G/DL (ref 32–36)
MCV RBC AUTO: 82 FL (ref 80–100)
MCV RBC AUTO: 83 FL (ref 80–100)
NRBC BLD-RTO: 1.5 /100 WBCS (ref 0–0)
NRBC BLD-RTO: 2.5 /100 WBCS (ref 0–0)
OXYHGB MFR BLDA: 95.5 % (ref 94–98)
OXYHGB MFR BLDA: 95.5 % (ref 94–98)
OXYHGB MFR BLDA: 96.3 % (ref 94–98)
OXYHGB MFR BLDA: 97 % (ref 94–98)
PCO2 BLDA: 38 MM HG (ref 38–42)
PCO2 BLDA: 40 MM HG (ref 38–42)
PCO2 BLDA: 40 MM HG (ref 38–42)
PCO2 BLDA: 43 MM HG (ref 38–42)
PH BLDA: 7.35 PH (ref 7.38–7.42)
PH BLDA: 7.38 PH (ref 7.38–7.42)
PH BLDA: 7.4 PH (ref 7.38–7.42)
PH BLDA: 7.41 PH (ref 7.38–7.42)
PHOSPHATE SERPL-MCNC: 2.2 MG/DL (ref 2.5–4.9)
PHOSPHATE SERPL-MCNC: 3.1 MG/DL (ref 2.5–4.9)
PLATELET # BLD AUTO: 58 X10*3/UL (ref 150–450)
PLATELET # BLD AUTO: 60 X10*3/UL (ref 150–450)
PO2 BLDA: 108 MM HG (ref 85–95)
PO2 BLDA: 122 MM HG (ref 85–95)
PO2 BLDA: 87 MM HG (ref 85–95)
PO2 BLDA: 92 MM HG (ref 85–95)
POTASSIUM BLDA-SCNC: 3.6 MMOL/L (ref 3.5–5.3)
POTASSIUM BLDA-SCNC: 4.4 MMOL/L (ref 3.5–5.3)
POTASSIUM SERPL-SCNC: 3.7 MMOL/L (ref 3.5–5.3)
POTASSIUM SERPL-SCNC: 3.9 MMOL/L (ref 3.5–5.3)
PROT SERPL-MCNC: 4.6 G/DL (ref 6.4–8.2)
PROTHROMBIN TIME: 14.3 SECONDS (ref 9.8–12.8)
RBC # BLD AUTO: 3.4 X10*6/UL (ref 4.5–5.9)
RBC # BLD AUTO: 3.49 X10*6/UL (ref 4.5–5.9)
SAO2 % BLDA: 100 % (ref 94–100)
SAO2 % BLDA: 99 % (ref 94–100)
SODIUM BLDA-SCNC: 128 MMOL/L (ref 136–145)
SODIUM BLDA-SCNC: 130 MMOL/L (ref 136–145)
SODIUM BLDA-SCNC: 130 MMOL/L (ref 136–145)
SODIUM BLDA-SCNC: 131 MMOL/L (ref 136–145)
SODIUM SERPL-SCNC: 132 MMOL/L (ref 136–145)
SODIUM SERPL-SCNC: 133 MMOL/L (ref 136–145)
WBC # BLD AUTO: 2.4 X10*3/UL (ref 4.4–11.3)
WBC # BLD AUTO: 2.6 X10*3/UL (ref 4.4–11.3)

## 2024-04-07 PROCEDURE — 71045 X-RAY EXAM CHEST 1 VIEW: CPT | Performed by: RADIOLOGY

## 2024-04-07 PROCEDURE — 85027 COMPLETE CBC AUTOMATED: CPT

## 2024-04-07 PROCEDURE — 99291 CRITICAL CARE FIRST HOUR: CPT | Performed by: STUDENT IN AN ORGANIZED HEALTH CARE EDUCATION/TRAINING PROGRAM

## 2024-04-07 PROCEDURE — 82330 ASSAY OF CALCIUM: CPT | Performed by: STUDENT IN AN ORGANIZED HEALTH CARE EDUCATION/TRAINING PROGRAM

## 2024-04-07 PROCEDURE — 2500000001 HC RX 250 WO HCPCS SELF ADMINISTERED DRUGS (ALT 637 FOR MEDICARE OP): Performed by: NURSE PRACTITIONER

## 2024-04-07 PROCEDURE — 80048 BASIC METABOLIC PNL TOTAL CA: CPT

## 2024-04-07 PROCEDURE — 94762 N-INVAS EAR/PLS OXIMTRY CONT: CPT

## 2024-04-07 PROCEDURE — 84100 ASSAY OF PHOSPHORUS: CPT

## 2024-04-07 PROCEDURE — 83735 ASSAY OF MAGNESIUM: CPT

## 2024-04-07 PROCEDURE — 2500000002 HC RX 250 W HCPCS SELF ADMINISTERED DRUGS (ALT 637 FOR MEDICARE OP, ALT 636 FOR OP/ED): Performed by: NURSE PRACTITIONER

## 2024-04-07 PROCEDURE — 2500000001 HC RX 250 WO HCPCS SELF ADMINISTERED DRUGS (ALT 637 FOR MEDICARE OP): Performed by: STUDENT IN AN ORGANIZED HEALTH CARE EDUCATION/TRAINING PROGRAM

## 2024-04-07 PROCEDURE — 2020000001 HC ICU ROOM DAILY

## 2024-04-07 PROCEDURE — 2500000005 HC RX 250 GENERAL PHARMACY W/O HCPCS: Performed by: STUDENT IN AN ORGANIZED HEALTH CARE EDUCATION/TRAINING PROGRAM

## 2024-04-07 PROCEDURE — 2500000004 HC RX 250 GENERAL PHARMACY W/ HCPCS (ALT 636 FOR OP/ED): Mod: JZ

## 2024-04-07 PROCEDURE — 84132 ASSAY OF SERUM POTASSIUM: CPT

## 2024-04-07 PROCEDURE — 2500000004 HC RX 250 GENERAL PHARMACY W/ HCPCS (ALT 636 FOR OP/ED): Performed by: STUDENT IN AN ORGANIZED HEALTH CARE EDUCATION/TRAINING PROGRAM

## 2024-04-07 PROCEDURE — 85384 FIBRINOGEN ACTIVITY: CPT

## 2024-04-07 PROCEDURE — 71045 X-RAY EXAM CHEST 1 VIEW: CPT

## 2024-04-07 PROCEDURE — 2500000004 HC RX 250 GENERAL PHARMACY W/ HCPCS (ALT 636 FOR OP/ED): Performed by: NURSE PRACTITIONER

## 2024-04-07 PROCEDURE — 99232 SBSQ HOSP IP/OBS MODERATE 35: CPT | Performed by: PHYSICIAN ASSISTANT

## 2024-04-07 PROCEDURE — 82947 ASSAY GLUCOSE BLOOD QUANT: CPT

## 2024-04-07 PROCEDURE — C9113 INJ PANTOPRAZOLE SODIUM, VIA: HCPCS | Performed by: NURSE PRACTITIONER

## 2024-04-07 PROCEDURE — 90937 HEMODIALYSIS REPEATED EVAL: CPT

## 2024-04-07 PROCEDURE — 84075 ASSAY ALKALINE PHOSPHATASE: CPT

## 2024-04-07 PROCEDURE — 94003 VENT MGMT INPAT SUBQ DAY: CPT

## 2024-04-07 PROCEDURE — 37799 UNLISTED PX VASCULAR SURGERY: CPT | Performed by: STUDENT IN AN ORGANIZED HEALTH CARE EDUCATION/TRAINING PROGRAM

## 2024-04-07 PROCEDURE — P9047 ALBUMIN (HUMAN), 25%, 50ML: HCPCS | Mod: JZ

## 2024-04-07 PROCEDURE — 99233 SBSQ HOSP IP/OBS HIGH 50: CPT | Performed by: INTERNAL MEDICINE

## 2024-04-07 PROCEDURE — 85730 THROMBOPLASTIN TIME PARTIAL: CPT

## 2024-04-07 PROCEDURE — 2500000004 HC RX 250 GENERAL PHARMACY W/ HCPCS (ALT 636 FOR OP/ED): Mod: JZ | Performed by: NURSE PRACTITIONER

## 2024-04-07 RX ORDER — ALBUMIN HUMAN 250 G/1000ML
12.5 SOLUTION INTRAVENOUS ONCE
Status: COMPLETED | OUTPATIENT
Start: 2024-04-07 | End: 2024-04-07

## 2024-04-07 RX ORDER — ALBUMIN HUMAN 250 G/1000ML
SOLUTION INTRAVENOUS
Status: COMPLETED
Start: 2024-04-07 | End: 2024-04-07

## 2024-04-07 RX ORDER — POTASSIUM CHLORIDE 1.5 G/1.58G
20 POWDER, FOR SOLUTION ORAL ONCE
Status: COMPLETED | OUTPATIENT
Start: 2024-04-07 | End: 2024-04-07

## 2024-04-07 RX ORDER — POTASSIUM CHLORIDE 14.9 MG/ML
20 INJECTION INTRAVENOUS ONCE
Status: DISCONTINUED | OUTPATIENT
Start: 2024-04-07 | End: 2024-04-07

## 2024-04-07 RX ADMIN — MICAFUNGIN SODIUM 100 MG: 100 INJECTION, POWDER, LYOPHILIZED, FOR SOLUTION INTRAVENOUS at 03:41

## 2024-04-07 RX ADMIN — INSULIN LISPRO 8 UNITS: 100 INJECTION, SOLUTION INTRAVENOUS; SUBCUTANEOUS at 07:55

## 2024-04-07 RX ADMIN — HYDROCORTISONE SODIUM SUCCINATE 25 MG: 100 INJECTION, POWDER, FOR SOLUTION INTRAMUSCULAR; INTRAVENOUS at 09:53

## 2024-04-07 RX ADMIN — INSULIN GLARGINE 16 UNITS: 100 INJECTION, SOLUTION SUBCUTANEOUS at 20:15

## 2024-04-07 RX ADMIN — MIDODRINE HYDROCHLORIDE 15 MG: 5 TABLET ORAL at 17:00

## 2024-04-07 RX ADMIN — INSULIN LISPRO 8 UNITS: 100 INJECTION, SOLUTION INTRAVENOUS; SUBCUTANEOUS at 16:23

## 2024-04-07 RX ADMIN — PANTOPRAZOLE SODIUM 40 MG: 40 INJECTION, POWDER, FOR SOLUTION INTRAVENOUS at 08:13

## 2024-04-07 RX ADMIN — HEPARIN SODIUM 5000 UNITS: 5000 INJECTION INTRAVENOUS; SUBCUTANEOUS at 03:41

## 2024-04-07 RX ADMIN — ALBUMIN HUMAN 12.5 G: 0.25 SOLUTION INTRAVENOUS at 21:14

## 2024-04-07 RX ADMIN — ALBUMIN HUMAN 12.5 G: 250 SOLUTION INTRAVENOUS at 21:14

## 2024-04-07 RX ADMIN — MIDODRINE HYDROCHLORIDE 15 MG: 5 TABLET ORAL at 09:52

## 2024-04-07 RX ADMIN — INSULIN LISPRO 8 UNITS: 100 INJECTION, SOLUTION INTRAVENOUS; SUBCUTANEOUS at 11:32

## 2024-04-07 RX ADMIN — HEPARIN SODIUM 5000 UNITS: 5000 INJECTION INTRAVENOUS; SUBCUTANEOUS at 20:03

## 2024-04-07 RX ADMIN — MEROPENEM 1 G: 1 INJECTION, POWDER, FOR SOLUTION INTRAVENOUS at 08:17

## 2024-04-07 RX ADMIN — HEPARIN SODIUM 5000 UNITS: 5000 INJECTION INTRAVENOUS; SUBCUTANEOUS at 11:10

## 2024-04-07 RX ADMIN — FLUDROCORTISONE ACETATE 0.1 MG: 0.1 TABLET ORAL at 13:00

## 2024-04-07 RX ADMIN — FLUDROCORTISONE ACETATE 0.1 MG: 0.1 TABLET ORAL at 02:03

## 2024-04-07 RX ADMIN — MEROPENEM 1 G: 1 INJECTION, POWDER, FOR SOLUTION INTRAVENOUS at 20:04

## 2024-04-07 RX ADMIN — POTASSIUM CHLORIDE 20 MEQ: 1.5 POWDER, FOR SOLUTION ORAL at 17:42

## 2024-04-07 RX ADMIN — PANTOPRAZOLE SODIUM 40 MG: 40 INJECTION, POWDER, FOR SOLUTION INTRAVENOUS at 20:04

## 2024-04-07 RX ADMIN — INSULIN GLARGINE 16 UNITS: 100 INJECTION, SOLUTION SUBCUTANEOUS at 08:15

## 2024-04-07 RX ADMIN — CALCIUM GLUCONATE 1 G: 20 INJECTION, SOLUTION INTRAVENOUS at 03:40

## 2024-04-07 RX ADMIN — HYDROCORTISONE SODIUM SUCCINATE 25 MG: 100 INJECTION, POWDER, FOR SOLUTION INTRAMUSCULAR; INTRAVENOUS at 21:59

## 2024-04-07 RX ADMIN — INSULIN LISPRO 8 UNITS: 100 INJECTION, SOLUTION INTRAVENOUS; SUBCUTANEOUS at 03:40

## 2024-04-07 RX ADMIN — INSULIN LISPRO 4 UNITS: 100 INJECTION, SOLUTION INTRAVENOUS; SUBCUTANEOUS at 20:15

## 2024-04-07 RX ADMIN — Medication: at 20:00

## 2024-04-07 ASSESSMENT — PAIN SCALES - GENERAL
PAINLEVEL_OUTOF10: 0 - NO PAIN
PAINLEVEL_OUTOF10: 0 - NO PAIN

## 2024-04-07 ASSESSMENT — PAIN - FUNCTIONAL ASSESSMENT

## 2024-04-07 NOTE — PROGRESS NOTES
"Jason Hollins is a 63 y.o. male on day 33 of admission presenting with Soft tissue sarcoma (CMS/HCC).    Subjective   No acute events overnight.  This morning, patient is alert and is able to respond to commands such as giving thumbs up and squeezing hands bilaterally.  He reports no significant pain.       Objective     Physical Exam  Constitutional:       General: He is not in acute distress.  Eyes:      Extraocular Movements: Extraocular movements intact.   Cardiovascular:      Rate and Rhythm: Normal rate and regular rhythm.   Pulmonary:      Effort: Pulmonary effort is normal.      Comments: Expiratory crackles and diminished breath sounds auscultated bilaterally.  Patient breathing on ventilator.  Abdominal:      General: Abdomen is flat. There is no distension.      Palpations: Abdomen is soft.   Musculoskeletal:      Comments: 2+ pitting edema noted right lower extremity.  1+ pitting edema noted in left lower extremity   Skin:     General: Skin is warm.   Neurological:      Mental Status: He is alert.      Comments: Alert and able to respond to commands with head nod.  Squeezes hands bilaterally to command.  Moves left lower extremity to command.  Does not move right lower extremity to command (consistent with prior exam)         Last Recorded Vitals  Blood pressure 122/55, pulse 71, temperature 37.5 °C (99.5 °F), temperature source Temporal, resp. rate 19, height 1.778 m (5' 10\"), weight 125 kg (275 lb 9.2 oz), SpO2 99 %.  Intake/Output last 3 Shifts:  I/O last 3 completed shifts:  In: 3096.7 (24.8 mL/kg) [I.V.:127.7 (1 mL/kg); Blood:604; Other:170; NG/GT:1645; IV Piggyback:550]  Out: 2880 (23 mL/kg) [Drains:280; Other:2000; Stool:600]  Weight: 125 kg     Relevant Results  Scheduled medications  fludrocortisone, 0.1 mg, nasogastric tube, q12h  heparin (porcine), 5,000 Units, subcutaneous, q8h  hydrocortisone sodium succinate, 50 mg, intravenous, q12h  insulin glargine, 16 Units, subcutaneous, q12h  insulin " lispro, 0-20 Units, subcutaneous, q4h  meropenem, 1 g, intravenous, q12h  midodrine, 20 mg, orogastric tube, q8h  oxygen, , inhalation, Continuous - Inhalation  pantoprazole, 40 mg, intravenous, BID  sodium phosphate, 21 mmol, intravenous, Once      Continuous medications  lactated Ringer's, 5 mL/hr, Last Rate: 5 mL/hr (04/07/24 0600)      PRN medications  PRN medications: alteplase, calcium gluconate, calcium gluconate, dextrose **OR** glucagon, magnesium sulfate, magnesium sulfate, sodium chloride  Results for orders placed or performed during the hospital encounter of 03/05/24 (from the past 24 hour(s))   POCT GLUCOSE   Result Value Ref Range    POCT Glucose 191 (H) 74 - 99 mg/dL   POCT GLUCOSE   Result Value Ref Range    POCT Glucose 227 (H) 74 - 99 mg/dL   Vancomycin   Result Value Ref Range    Vancomycin 14.5 5.0 - 20.0 ug/mL   Basic metabolic panel   Result Value Ref Range    Glucose 240 (H) 74 - 99 mg/dL    Sodium 131 (L) 136 - 145 mmol/L    Potassium 3.6 3.5 - 5.3 mmol/L    Chloride 96 (L) 98 - 107 mmol/L    Bicarbonate 25 21 - 32 mmol/L    Anion Gap 14 10 - 20 mmol/L    Urea Nitrogen 22 6 - 23 mg/dL    Creatinine 0.96 0.50 - 1.30 mg/dL    eGFR 89 >60 mL/min/1.73m*2    Calcium 7.9 (L) 8.6 - 10.6 mg/dL   Blood Gas Arterial Full Panel   Result Value Ref Range    POCT pH, Arterial 7.46 (H) 7.38 - 7.42 pH    POCT pCO2, Arterial 37 (L) 38 - 42 mm Hg    POCT pO2, Arterial 67 (L) 85 - 95 mm Hg    POCT SO2, Arterial 96 94 - 100 %    POCT Oxy Hemoglobin, Arterial 92.5 (L) 94.0 - 98.0 %    POCT Hematocrit Calculated, Arterial 31.0 (L) 41.0 - 52.0 %    POCT Sodium, Arterial 129 (L) 136 - 145 mmol/L    POCT Potassium, Arterial 3.6 3.5 - 5.3 mmol/L    POCT Chloride, Arterial 97 (L) 98 - 107 mmol/L    POCT Ionized Calcium, Arterial 1.13 1.10 - 1.33 mmol/L    POCT Glucose, Arterial 231 (H) 74 - 99 mg/dL    POCT Lactate, Arterial 1.5 0.4 - 2.0 mmol/L    POCT Base Excess, Arterial 2.4 -2.0 - 3.0 mmol/L    POCT HCO3  Calculated, Arterial 26.3 (H) 22.0 - 26.0 mmol/L    POCT Hemoglobin, Arterial 10.2 (L) 13.5 - 17.5 g/dL    POCT Anion Gap, Arterial 9 (L) 10 - 25 mmo/L    Patient Temperature 37.0 degrees Celsius    FiO2 40 %   CALCIUM, IONIZED   Result Value Ref Range    POCT Calcium, Ionized 1.06 (L) 1.1 - 1.33 mmol/L   CBC   Result Value Ref Range    WBC 1.4 (L) 4.4 - 11.3 x10*3/uL    nRBC 4.9 (H) 0.0 - 0.0 /100 WBCs    RBC 3.44 (L) 4.50 - 5.90 x10*6/uL    Hemoglobin 10.1 (L) 13.5 - 17.5 g/dL    Hematocrit 28.3 (L) 41.0 - 52.0 %    MCV 82 80 - 100 fL    MCH 29.4 26.0 - 34.0 pg    MCHC 35.7 32.0 - 36.0 g/dL    RDW 19.1 (H) 11.5 - 14.5 %    Platelets 53 (L) 150 - 450 x10*3/uL   Magnesium   Result Value Ref Range    Magnesium 2.09 1.60 - 2.40 mg/dL   Renal Function Panel   Result Value Ref Range    Glucose 239 (H) 74 - 99 mg/dL    Sodium 132 (L) 136 - 145 mmol/L    Potassium 3.6 3.5 - 5.3 mmol/L    Chloride 97 (L) 98 - 107 mmol/L    Bicarbonate 25 21 - 32 mmol/L    Anion Gap 14 10 - 20 mmol/L    Urea Nitrogen 22 6 - 23 mg/dL    Creatinine 0.97 0.50 - 1.30 mg/dL    eGFR 88 >60 mL/min/1.73m*2    Calcium 7.9 (L) 8.6 - 10.6 mg/dL    Phosphorus 1.1 (L) 2.5 - 4.9 mg/dL    Albumin 2.7 (L) 3.4 - 5.0 g/dL   Type and screen   Result Value Ref Range    ABO TYPE O     Rh TYPE POS     ANTIBODY SCREEN NEG    Blood Gas Arterial Full Panel   Result Value Ref Range    POCT pH, Arterial 7.46 (H) 7.38 - 7.42 pH    POCT pCO2, Arterial 36 (L) 38 - 42 mm Hg    POCT pO2, Arterial 78 (L) 85 - 95 mm Hg    POCT SO2, Arterial 98 94 - 100 %    POCT Oxy Hemoglobin, Arterial 94.9 94.0 - 98.0 %    POCT Hematocrit Calculated, Arterial 31.0 (L) 41.0 - 52.0 %    POCT Sodium, Arterial 130 (L) 136 - 145 mmol/L    POCT Potassium, Arterial 3.5 3.5 - 5.3 mmol/L    POCT Chloride, Arterial 98 98 - 107 mmol/L    POCT Ionized Calcium, Arterial 1.13 1.10 - 1.33 mmol/L    POCT Glucose, Arterial 229 (H) 74 - 99 mg/dL    POCT Lactate, Arterial 1.6 0.4 - 2.0 mmol/L    POCT Base  Excess, Arterial 1.8 -2.0 - 3.0 mmol/L    POCT HCO3 Calculated, Arterial 25.6 22.0 - 26.0 mmol/L    POCT Hemoglobin, Arterial 10.2 (L) 13.5 - 17.5 g/dL    POCT Anion Gap, Arterial 10 10 - 25 mmo/L    Patient Temperature 37.0 degrees Celsius    FiO2 50 %   Blood Gas Arterial Full Panel   Result Value Ref Range    POCT pH, Arterial 7.44 (H) 7.38 - 7.42 pH    POCT pCO2, Arterial 37 (L) 38 - 42 mm Hg    POCT pO2, Arterial 64 (L) 85 - 95 mm Hg    POCT SO2, Arterial 95 94 - 100 %    POCT Oxy Hemoglobin, Arterial 92.0 (L) 94.0 - 98.0 %    POCT Hematocrit Calculated, Arterial 32.0 (L) 41.0 - 52.0 %    POCT Sodium, Arterial 130 (L) 136 - 145 mmol/L    POCT Potassium, Arterial 3.8 3.5 - 5.3 mmol/L    POCT Chloride, Arterial 99 98 - 107 mmol/L    POCT Ionized Calcium, Arterial 1.08 (L) 1.10 - 1.33 mmol/L    POCT Glucose, Arterial 224 (H) 74 - 99 mg/dL    POCT Lactate, Arterial 1.7 0.4 - 2.0 mmol/L    POCT Base Excess, Arterial 1.0 -2.0 - 3.0 mmol/L    POCT HCO3 Calculated, Arterial 25.1 22.0 - 26.0 mmol/L    POCT Hemoglobin, Arterial 10.5 (L) 13.5 - 17.5 g/dL    POCT Anion Gap, Arterial 10 10 - 25 mmo/L    Patient Temperature 37.0 degrees Celsius    FiO2 50 %   POCT GLUCOSE   Result Value Ref Range    POCT Glucose 240 (H) 74 - 99 mg/dL   Blood Gas Arterial Full Panel   Result Value Ref Range    POCT pH, Arterial 7.45 (H) 7.38 - 7.42 pH    POCT pCO2, Arterial 35 (L) 38 - 42 mm Hg    POCT pO2, Arterial 122 (H) 85 - 95 mm Hg    POCT SO2, Arterial 99 94 - 100 %    POCT Oxy Hemoglobin, Arterial 95.9 94.0 - 98.0 %    POCT Hematocrit Calculated, Arterial 30.0 (L) 41.0 - 52.0 %    POCT Sodium, Arterial 130 (L) 136 - 145 mmol/L    POCT Potassium, Arterial 3.7 3.5 - 5.3 mmol/L    POCT Chloride, Arterial 99 98 - 107 mmol/L    POCT Ionized Calcium, Arterial 1.11 1.10 - 1.33 mmol/L    POCT Glucose, Arterial 241 (H) 74 - 99 mg/dL    POCT Lactate, Arterial 1.6 0.4 - 2.0 mmol/L    POCT Base Excess, Arterial 0.5 -2.0 - 3.0 mmol/L    POCT HCO3  Calculated, Arterial 24.3 22.0 - 26.0 mmol/L    POCT Hemoglobin, Arterial 10.0 (L) 13.5 - 17.5 g/dL    POCT Anion Gap, Arterial 10 10 - 25 mmo/L    Patient Temperature 37.0 degrees Celsius    FiO2 60 %   Renal Function Panel   Result Value Ref Range    Glucose 260 (H) 74 - 99 mg/dL    Sodium 131 (L) 136 - 145 mmol/L    Potassium 3.9 3.5 - 5.3 mmol/L    Chloride 96 (L) 98 - 107 mmol/L    Bicarbonate 25 21 - 32 mmol/L    Anion Gap 14 10 - 20 mmol/L    Urea Nitrogen 29 (H) 6 - 23 mg/dL    Creatinine 1.17 0.50 - 1.30 mg/dL    eGFR 70 >60 mL/min/1.73m*2    Calcium 7.9 (L) 8.6 - 10.6 mg/dL    Phosphorus 1.7 (L) 2.5 - 4.9 mg/dL    Albumin 2.6 (L) 3.4 - 5.0 g/dL   Magnesium   Result Value Ref Range    Magnesium 2.17 1.60 - 2.40 mg/dL   POCT GLUCOSE   Result Value Ref Range    POCT Glucose 259 (H) 74 - 99 mg/dL   Blood Gas Arterial Full Panel   Result Value Ref Range    POCT pH, Arterial 7.47 (H) 7.38 - 7.42 pH    POCT pCO2, Arterial 34 (L) 38 - 42 mm Hg    POCT pO2, Arterial 136 (H) 85 - 95 mm Hg    POCT SO2, Arterial 100 94 - 100 %    POCT Oxy Hemoglobin, Arterial 96.7 94.0 - 98.0 %    POCT Hematocrit Calculated, Arterial 30.0 (L) 41.0 - 52.0 %    POCT Sodium, Arterial 130 (L) 136 - 145 mmol/L    POCT Potassium, Arterial 3.7 3.5 - 5.3 mmol/L    POCT Chloride, Arterial 99 98 - 107 mmol/L    POCT Ionized Calcium, Arterial 1.11 1.10 - 1.33 mmol/L    POCT Glucose, Arterial 261 (H) 74 - 99 mg/dL    POCT Lactate, Arterial 2.7 (H) 0.4 - 2.0 mmol/L    POCT Base Excess, Arterial 1.2 -2.0 - 3.0 mmol/L    POCT HCO3 Calculated, Arterial 24.7 22.0 - 26.0 mmol/L    POCT Hemoglobin, Arterial 9.9 (L) 13.5 - 17.5 g/dL    POCT Anion Gap, Arterial 10 10 - 25 mmo/L    Patient Temperature 37.0 degrees Celsius    FiO2 50 %   CALCIUM, IONIZED   Result Value Ref Range    POCT Calcium, Ionized 1.08 (L) 1.1 - 1.33 mmol/L   CBC   Result Value Ref Range    WBC 2.4 (L) 4.4 - 11.3 x10*3/uL    nRBC 2.5 (H) 0.0 - 0.0 /100 WBCs    RBC 3.40 (L) 4.50 - 5.90  x10*6/uL    Hemoglobin 9.7 (L) 13.5 - 17.5 g/dL    Hematocrit 28.1 (L) 41.0 - 52.0 %    MCV 83 80 - 100 fL    MCH 28.5 26.0 - 34.0 pg    MCHC 34.5 32.0 - 36.0 g/dL    RDW 19.3 (H) 11.5 - 14.5 %    Platelets 58 (L) 150 - 450 x10*3/uL   Coagulation Screen   Result Value Ref Range    Protime 14.3 (H) 9.8 - 12.8 seconds    INR 1.3 (H) 0.9 - 1.1    aPTT 28 27 - 38 seconds   Hepatic function panel   Result Value Ref Range    Albumin 2.5 (L) 3.4 - 5.0 g/dL    Bilirubin, Total 26.5 (H) 0.0 - 1.2 mg/dL    Bilirubin, Direct 18.6 (H) 0.0 - 0.3 mg/dL    Alkaline Phosphatase 295 (H) 33 - 136 U/L    ALT 63 (H) 10 - 52 U/L     (H) 9 - 39 U/L    Total Protein 4.6 (L) 6.4 - 8.2 g/dL   Magnesium   Result Value Ref Range    Magnesium 2.23 1.60 - 2.40 mg/dL   Fibrinogen   Result Value Ref Range    Fibrinogen 230 200 - 400 mg/dL   Basic Metabolic Panel   Result Value Ref Range    Glucose 225 (H) 74 - 99 mg/dL    Sodium 133 (L) 136 - 145 mmol/L    Potassium 3.9 3.5 - 5.3 mmol/L    Chloride 97 (L) 98 - 107 mmol/L    Bicarbonate 24 21 - 32 mmol/L    Anion Gap 16 10 - 20 mmol/L    Urea Nitrogen 40 (H) 6 - 23 mg/dL    Creatinine 1.51 (H) 0.50 - 1.30 mg/dL    eGFR 52 (L) >60 mL/min/1.73m*2    Calcium 7.8 (L) 8.6 - 10.6 mg/dL   Phosphorus   Result Value Ref Range    Phosphorus 2.2 (L) 2.5 - 4.9 mg/dL   POCT GLUCOSE   Result Value Ref Range    POCT Glucose 225 (H) 74 - 99 mg/dL                    Assessment/Plan   Principal Problem:    Soft tissue sarcoma (CMS/HCC)  Active Problems:    Acute kidney injury (nontraumatic) (CMS/HCC)    Pulmonary embolus (CMS/HCC)    Anemia    Thrombocytopenia (CMS/HCC)    Extraskeletal myxoid chondrosarcoma (CMS/HCC)    Cardiopulmonary arrest (CMS/HCC)    Acute respiratory failure with hypoxia (CMS/HCC)    Tracheostomy hemorrhage (CMS/HCC)    Jason Hollins is a 63 y.o. male with PMH of DM2, HLD, myxoid chondrosarcoma s/p wide resection sarcoma, sciatic nerve neurolysis of right lower extremity with right  gluteal reconstruction, pedicle ALT, vastus lateralus flap, pedicle gluteal and perisformis flap with Dr. Hawkins and Dr. Smith on 3/5/24.  Post operative course was uncomplicated and patient was on RNF until 3/20 for prolonged bed rest to avoid hip flexion to maintain flap integrity.  Patient was on prophylactic lovenox while on bedrest.      3/20: Cardiopulmonary arrest s/p CPR with ROSC after TPA, overnight started on heparin, epo, increased pressor requirements, increased swelling at flap site.      3/21: Hemorrhagic shock, MTP. Firm and large right flap site. Return to OR s/p Exploration of right thigh wound, Evacuation of hematoma. Suture ligation of multiple bleeding vessel and several points in gluteal area.      4/1: s/p Trach     4/4: return OR with ENT s/p laryngoscopy, esophagoscopy and bronchoscopy, trach revision. Found extensive clot burden in esophagus and stomach as well as in the lungs. Oozing at thyroid bed cauterized. Trach exchanged to new 6.0 prox XLT chrisley. OR findings:  blood up glottis and from thyroid bed to airway.     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 Hawkins and Dr. Smith on 3/5/24 s/p cardiopulmonary arrest with ROSC 3/20. CT head 3/22 without acute process. Weaned off cisatracurium and propofol overnight 3/23-3/24. Ketamine weaned off 3/25 and Fentanyl weaned off 3/26 am. Repeat CT Head 3/26 without acute process. MRI Brain 3/30, negative for cerebral infarction or hemorrhage. Neuro exam - off sedation, following commands except for RLE, tracking, nodding/shaking head to questions  - ongoing neuro and pain assessments  - keep off sedation  - PT/OT -> AROM  - no need for soft wrist restraints at this time -> continue to reassess     CV: History of HTN, HLD. Acute cardiopulmonary  arrest 2/2 to possible massive PE vs massive STEMI, received multiple rounds of CPR and one defibrillation.  Sustained ROSC achieved after TPA bolus. On high dose levophed, epinephrine, vaso, angioT2. Plan on 3/21 was for ECMO which was aborted secondary to large hemhorrge at right flap site. Had MTP and taken OR with 5L evacuated. ECHO 3/21: Normal LV, Mod enlarged RV, slight suggestion of a Townsend's sign / RV strain, low normal RV function. ECHO 3/22 with hyperdynamic LV. New onset Afib with RVR overnight 3/22-3/23, dosed with 150mg amio x2, started infusion at 1mg/min thereafter. AT2 weaned off. Amio infusion discontinued 3/25. Lactate downtrending. Vaso and epi weaned off. Remains in NSR. iEpo weaned off 3/27. Repeat TTE 3/29 and 4/2 essentially unchanged. Levo resumed 4/2 evening to continue aggressive fluid removal. Off of pressors since 4/5 evening.  - continuous EKG/abp/cvp monitoring  - Goal map range 65-90  - Decreased Midodrine to 15mg TID today  - Continue florinef 0.1mg BID   - Decreased SDS to hydrocortisone 25mg BID today  - Holding heparin infusion for now      PULM: Arrived to SICU intubated julio c-CPR. Concern for massive PE. Started on iEpo, currently on 0.05. Hypoxic respiratory failure. Severe ARDS. ETT exchanged 3/23. CT Chest 3/26 with interval JOHN consolidative/ground glass opacity. S/p Tracheostomy on 4/1. Bedside bronch -> some blood in trach, posterior wall tissue just distal to trach tip appears to be collapsing on cannula. Currently on SIMV 50%, 500, 18, +10, 10 PS. PIP up to 31 and Vt 375-500cc.   - Wean FiO2 as tolerated -plan to wean FiO2 to 40%.  Discussed with RT to try pressure support ventilation today at pressure support 10, PEEP 15, FiO2 40%  - ABGs prn  - additional pulm toilet prn -will discuss regimen with RT  - daily CXR -chest x-ray today indicates similar bilateral hazy opacities, consistent with prior day     ENT: Had epistaxis 3/23, completed afrin bid x3 days. S/p  trach on 4/1. Bleeding from trach site 4/2 am, packing placed by ENT. Repeat episode of bloody tracheal secretions 4/3 through this am. Taken back to OR with ENT 4/4 as per above.  - TXA as above and holding heparin for now     GI: NPO. Shock liver, pancreatitis. Elevated TG. Elevated lactate. Consulted ACS for potential bowel ischemia as cause of persistent lactic acidosis. CT imaging 3/22 with evidence of pancreatitis. No acute surgical indication per ACS. RUQ US 3/22 with thickening of GB wall without cholelithiasis. CT A/P 3/26 negative for acute bleed. LFTs downtrending. Worsening hyperbilirubinemia. Amylase and lipase now WNL. Repeat RUQ US 4/1 with nonspecific gallbladder wall edema and thickening which may be 2/2 generalized fluid overload, mild hepatomegaly with slight nodular contour and c/f steatosis and fibrosis, irregular hypoechoic region adjacent to hepatic veins. US liver with doppler 4/2 revealed hepatomegaly with nodular contour, mildly elevated RI in main and left hepatic arteries. Hyperbilirubinemia  -Continue TF - abdomen is soft, nontender, nondistended today  - prostat daily  - PPI BID for GI ppx  - Trend LFTs daily -AST/ALT/total bilirubin is approximately similar to prior days.  Per discussion with hepatology yesterday, minor fluctuations are to be expected     : No history of renal disease. Baseline creatinine 0.6. Anuric RUTH. Elevated CK, downtrending. Metabolic acidosis, total body fluid overload, hyperkalemia. Started on CVVH 3/21, stopped bicarb infusion 3/22. Marino removed 3/24. Hyponatremia resolved. Stopped CVVH 4/2. Tolerated 2L fluid removal with SLED overnight.  - Nephrology following -plan for SLED tonight with goal 1.5-2L UF  - RFP BID and PRN  - Replete electrolytes judiciously  - Bladder scan pending this AM      HEME: Patient was on ppx lovenox for DVT prophylaxis prior to cardiopulmonary arrest. Concern for massive PE patient received bolus of TPA during CPR. Started on  heparin infusion overnight given concern for PE. Hemorrhagic shock 3/21, MTP and back to OR s/p Exploration of right thigh woundEvacuation of hematoma. Suture ligation of multiple bleeding vessel and several points in gluteal area. Hematology consulted 3/22 to r/o HLH. Transfused 1 RBC overnight 3/22-3/23. Thrombocytopenia, coagulapathy, hypofibrinogenemia. LE Duplex 3/25 +acute occlusive R soleal DVT. Received 2u PRBC and 1u FFP on 3/26. Heparin infusion discontinued 3/26 to r/o HIT and transitioned to bival. PF4 negative, resumed heparin infuision 3/27. Elevated IL2R and decreased NK function. C/f HLH (low suspicion), empirically treated with decadron (3/28-3/31). Thrombocytopenia on 3/30 to 18k, received 5pk, did not increment. Heparin held. Thrombocytopenia likely 2/2 to consumptive process, hematology following. Received IVIG and 5pk plts 3/31. Pancytopenia persists, improving thrombocytopenia  - cbc, coags bid and prn  -Hemoglobin stable at 9.7 (prior 10.1)  - Continue subcutaneous heparin for DVT prophylaxis today  - Tomorrow - will renew T&S, hold pRBC, FFP, Plt for anticipation of OR on 4/9  - Hematology following, appreciate recs -> maintain Plt count >35k  - ongoing monitoring for s/s bleeding  - close surveillance of RLE and drains  - Vasc Med signed off 3/27  - maintain active T&S (4/6)     ENDO: History of DM2. A1c 7.5%. TSH WNL 1/2024. Hyperglycemia  -Lantus increased to 16 units twice daily last night.  Patient continues to require significant sliding scale insulin (total 26 units Lantus and 52 units lispro for past 24 hours). Will continue Lantus 16u BID and use SSI given we are de-escalating steroids and patient will be NPO 4/8 night for OR 4/9. Will monitor blood glucose today   - q4h accuchecks and SSI #4     ID: Leukocytosis resolved, now with leukopenia. On broad antimicrobial therapy. MRSA screen + 2/28/24. Pan cultures sent 3/22. Sputum NTF, +MRSA screen (completed 5 day course mupirocin),  UCx and BCx negative. Completed 7 day course vanc/zosyn 3/27. Febrile to 39.1 4/3, resent blood cultures and BAL and started vanco and zosyn. Switched zosyn to reynaldo, kept on vanco, added john. Blood cultures and sputum with Klebsiella. Repeat blood cultures sent 4/4.  - F/U cultures  - temp q4h, wbc daily  - Discontinue micafungin today given no yeast growth on blood cultures  - continue meropenem  - ongoing monitoring for s/s infection  - ID consult - rec continuing antibiotics  - plastics team noted some darkened discoloration of the native tissue at the R posterior hip incision that is c/f necrosis that the surgeon would like to ideally debride and close on 4/9 if patient is medically stable     Lines:  - right radial a line (4/5)  - RIJ trialysis (3/27)  - PIV x2     Family: Spoke to sister at bedside today     Dispo: Continue ICU care. Patient seen and discussed with ICU attending Nayan Dailey, PGY-1  SICU team

## 2024-04-07 NOTE — PROGRESS NOTES
Department of Plastic and Reconstructive Surgery  Daily Progress Note    Patient Name: Jason Hollins  MRN: [id number]  Date:  04/07/24     Subjective   Remains critically ill in ICU, currently off pressors. Responsive to commands, moving 3/4 extremities (no movements in RLE yet). Shakes head no when asked if in pain. Incisional wound vac intact to posterior R thigh, without issue overnight. Plan for potential return to OR 4/9 as clinical stability allows for debridement of darkened/discolored tissue at R posterior thigh with possible tissue advancement and closure.      Objective   Vitals:    04/07/24 0825   BP:    Pulse:    Resp: 23   Temp:    SpO2: 97%     Physical Exam   Constitutional: Alert, lying in pressure relieving bed in ICU, appears critically ill.   ENMT: MMM, dobhoff in R nare.  Cardiovascular: RRR on telemetry monitor.  Respiratory/Thorax: Trach to vent.  Gastrointestinal: Abdomen soft, protuberant.   Genitourinary: SLED   Musculoskeletal: Able to squeeze bilateral hands, wiggles toes on the left.   Extremities: Generalized pitting edema. Right thigh edematous, but soft and compressible, no bruising or tissue induration noted. Flap recipient site and adjacent anterior R thigh incisions appear stable. Incision and native tissue overlying R posterior thigh (previously noted with maceration and darkened discoloration) dressed with intact incisional wound vac dressing, maintaining low continuous suction at -75mmHg, no alarms for leak, obstruction or malfunction, expectantly no output in collecting canister. ANDERSON drain x 3 right upper leg with dark serous outputs.  Neurological: Off sedation, alert and able to respond to commands with head nod.   Skin: Edematous, juandice, warm.     Current Medications  Scheduled medications  fludrocortisone, 0.1 mg, nasogastric tube, q12h  heparin (porcine), 5,000 Units, subcutaneous, q8h  hydrocortisone sodium succinate, 25 mg, intravenous, q12h  insulin glargine, 16 Units,  subcutaneous, q12h  insulin lispro, 0-20 Units, subcutaneous, q4h  meropenem, 1 g, intravenous, q12h  midodrine, 15 mg, orogastric tube, q8h  oxygen, , inhalation, Continuous - Inhalation  pantoprazole, 40 mg, intravenous, BID    Continuous medications  lactated Ringer's, 5 mL/hr, Last Rate: 5 mL/hr (04/07/24 0600)    PRN medications  PRN medications: alteplase, calcium gluconate, calcium gluconate, dextrose **OR** glucagon, magnesium sulfate, magnesium sulfate, sodium chloride     Assessment   Jason Hollins 63 y.o. 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 Hawkins and Dr. Smith on 3/5/24. Postoperative course c/b arrest requiring CPR with ROSC after TPA for assumed large PE on 3/20. Increased swelling at flap site appreciated overnight 3/20-3/21 and pt returned to OR for exploration of R flap site, evacuation of hematoma, suture ligation of multiple bleeding vessel sites in gluteal area and wound closure with Dr. Smith on 3/21. Pt has remained in [facility]ICU for continued critical care evaluation and management postoperatively.     Plan/Recommendations  - Anticipate return to OR with plastic surgery tentatively on Tuesday 4/9 as clinical stability permits for R posterior thigh tissue I&D with possible tissue advancement and complex closure        ·   Appreciate clearance for return to OR per ICU care team   - Maintain incisional wound vac to R posterior thigh wound site per plastic surgery         ·  Settings: -75 mmHg low continuous suction        ·  Dressing to be maintained until tentative return to OR 4/9       ·  If unable to return to OR 4/9, will follow up timing of next dressing change/removal        ·  Monitor/record vac canister output [id number]       ·  Notify plastic surgery with any concerns of leak or  obstruction   - Monitor right thigh for s/s of bleeding recurrence or worsening progression, has been stable on serial assessments over past week   - Continue ANDERSON drain care to x3 drains present at R thigh flap reconstruction site      ·  Strip drain tubing TID and PRN     ·  Monitor and record output q8h      ·  Keep drain sites C/D/I      ·  Notify plastics if ANDERSON drain output exceeds > 100 ml/hr in one drain or > 300 ml/hr collectively  - Avoid pressure application or positioning onto flap site or incisions at R upper leg   - Recommend patient be positioned off of R side as able to prevent flap compression/wound breakdown   - Continue clinitron fluidized air mattress  - Appreciate remaining supportive care per ICU   - Plastic Surgery will continue to follow     Patient and plan discussed with Dr. Smith.     Emily Conroy PA-C   Plastic and Reconstructive Surgery   Available via [employer], pager: [phone] or Team phones: [phone]

## 2024-04-07 NOTE — SIGNIFICANT EVENT
Contacted MD regarding persistently elevated blood sugars >240 despite aggressive sliding scale lispro and 10 lantus. Lantus increased to 16units  Na 131 and downtrending, per MD stop free water flushes.

## 2024-04-07 NOTE — PROGRESS NOTES
Jason Hollins is a 63 y.o. male on day 33 of admission presenting with Soft tissue sarcoma (CMS/HCC).      Subjective   No dialysis overnight. No UOP. Off vasopressors.        Objective          Vitals 24HR  Heart Rate:  [71-94]   Temp:  [36.4 °C (97.5 °F)-37.6 °C (99.7 °F)]   Resp:  [14-35]   SpO2:  [91 %-99 %]         Intake/Output last 3 Shifts:    Intake/Output Summary (Last 24 hours) at 4/7/2024 0841  Last data filed at 4/7/2024 0600  Gross per 24 hour   Intake 1937.31 ml   Output 405 ml   Net 1532.31 ml         Physical Exam  General appearance: intubated, trached  Heart: RRR  Lungs: FiO2 50% on vent via trach  : no Marino  ACCESS:  right I J trialysis    Current Facility-Administered Medications:     alteplase (Cathflo Activase) injection 2 mg, 2 mg, intra-catheter, PRN, Gracie Skaggs APRN-CNP    calcium gluconate in NS IV 1 g, 1 g, intravenous, q6h PRN, Gracie Skaggs APRN-CNP, Stopped at 04/07/24 0410    calcium gluconate in NS IV 2 g, 2 g, intravenous, q6h PRN, Gracie Skaggs, APRN-CNP, Last Rate: 100 mL/hr at 04/04/24 1118, 2 g at 04/04/24 1118    dextrose 50 % injection 25 g, 25 g, intravenous, q15 min PRN **OR** glucagon (Glucagen) injection 1 mg, 1 mg, intramuscular, q15 min PRN, Gracie Skaggs APRN-CNP    fludrocortisone (Florinef) tablet 0.1 mg, 0.1 mg, nasogastric tube, q12h, Gracie Skaggs APRN-CNP, 0.1 mg at 04/07/24 0203    heparin (porcine) injection 5,000 Units, 5,000 Units, subcutaneous, q8h, Fernandez Ríos, PHILLIP-CNP, DNP, 5,000 Units at 04/07/24 0341    hydrocortisone sod succ (PF) (Solu-CORTEF) injection 50 mg, 50 mg, intravenous, q12h, Fernandez Ríos, APRN-CNP, DNP, 50 mg at 04/06/24 2220    insulin glargine (Lantus) injection 16 Units, 16 Units, subcutaneous, q12h, Narcisa Chance MD, 16 Units at 04/07/24 0815    insulin lispro (HumaLOG) injection 0-20 Units, 0-20 Units, subcutaneous, q4h, PHILLIP Bernal-CNP, 8 Units at 04/07/24 8318     lactated Ringer's infusion, 5 mL/hr, intravenous, Continuous, Gordon Nieves MD, Last Rate: 5 mL/hr at 04/07/24 0600, 5 mL/hr at 04/07/24 0600    magnesium sulfate IV 2 g, 2 g, intravenous, q6h PRN, Gracie Skaggs APRN-CNP, Stopped at 03/24/24 1726    magnesium sulfate IV 4 g, 4 g, intravenous, q6h PRN, Gracie EBTTINA Skaggs APRN-CNP, Stopped at 04/05/24 0838    meropenem (Merrem) in sodium chloride 0.9 % 100 mL IV 1 g, 1 g, intravenous, q12h, Danilo Torres MD, Last Rate: 200 mL/hr at 04/07/24 0817, 1 g at 04/07/24 0817    midodrine (Proamatine) tablet 20 mg, 20 mg, orogastric tube, q8h, Gracie Skaggs APRN-CNP, 20 mg at 04/06/24 1823    oxygen (O2) therapy, , inhalation, Continuous - Inhalation, Mariluz Dailey MD, Rate Verify at 04/07/24 0839    pantoprazole (ProtoNix) injection 40 mg, 40 mg, intravenous, BID, PHILLIP Bernal-CNP, 40 mg at 04/07/24 0813    sodium chloride (Ocean) 0.65 % nasal spray 1 spray, 1 spray, Each Nostril, 4x daily PRN, Osman Perkins MD      Assessment/Plan   Jason Hollins is a 63 y.o. 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 Hawkins and Dr. Smith on 3/5/24. On 3/20, he developed massive PE and was given TPA, taken to OR in setting of increased swelling at flap site- hematoma for exploration and evacuation. Nephrology following for RUTH.    #Acute Kidney Injury on Dialysis (RUTH-D), anuric  - etiology hemodynamic from hemorrhagic shock  - began on CVVH since 3/21 via R I J trialysis and transitioned to SLED on 4/2  - Cr baseline 0.6  - electrolytes: hypophos  - anuric. I/O: +1.8  - FiO2 50% on vent - trach    Myxoid Chondrosarcoma s/p wide resection 3/5/2024  Thrombocytopenia    Plan  - Will plan for SLED tonight with goal 1.5-2L UF. If hemodynamics remain  stable will likely transition to hemodialysis   - bladder scan once per shift to monitor for renal recovery  - please draw BMP 4 hours after SLED termination tomorrow for accurate electrolyte levels   - strict I/O    D/w Dr. Yolanda Hung, DO  Nephrology Fellow PGY 5  Contact via secure chat  Nephrology Pager # [phone] ([phone])

## 2024-04-07 NOTE — SIGNIFICANT EVENT
Pt was not physically seen today.  Seems to be doing well .     Waiting for second surgical site exploration surgery on 4/9.   Afebrile  stable low normal white blood cell count      Been on meropenem for 5 days for bacteremia(klebsiella aerogenes) and  VAP (klebsiella aerogenes, Enterococcus faecalis  )  .  Vancomycin and micafungin have been discontinued.                  Klebsiella (Enterobacter) aerogenes Enterococcus faecalis       MICROSCAN GRADIENT DIFFUSION MICROSCAN    $ Amoxicillin/Clavulanate Resistant      $$ Ampicillin Resistant  Susceptible    $$$ Ampicillin/Sulbactam Resistant      $ Cefazolin Resistant      $$ Cefepime Susceptible      $ Ciprofloxacin Susceptible      $ Gentamicin Susceptible       Gentamicin Synergy   Susceptible    $$$ Imipenem  Susceptible     $$$ Levofloxacin Susceptible       Penicillin   Susceptible    $$ Piperacillin/Tazobactam Susceptible      $$ Tetracycline   Resistant    $ Trimethoprim/Sulfamethoxazole Susceptible  Resistant    $ Vancomycin   Susceptible        Estimated Creatinine Clearance: 56.8 mL/min (A) (by C-G formula based on SCr of 1.79 mg/dL (H)).    FINAL RECOMMENDATION   CONTINUE meropenem ( increase the dose to 1 Gram q 8 hours from the current order of q 12 hours) for total 10 days from the surgery and clearance of bacteremia  : End date : 4/14/24     ID will sign off ,  please call back if you have any further questions.       This case was discussed with my attending, Dr. Gilberto Hilario , who agreed with my assessment and plan.       ANA CRISTINA KAM.  M.D / ID consult Team A  Infectious disease fellow PGY-4  You can reach me by [employer] or page me at [phone]

## 2024-04-08 ENCOUNTER — APPOINTMENT (OUTPATIENT)
Dept: RADIOLOGY | Facility: HOSPITAL | Age: 64
DRG: 003 | End: 2024-04-08
Payer: COMMERCIAL

## 2024-04-08 ENCOUNTER — ANESTHESIA EVENT (OUTPATIENT)
Dept: OPERATING ROOM | Facility: HOSPITAL | Age: 64
DRG: 003 | End: 2024-04-08
Payer: COMMERCIAL

## 2024-04-08 PROBLEM — T81.31XA POSTOPERATIVE WOUND BREAKDOWN, INITIAL ENCOUNTER: Status: ACTIVE | Noted: 2024-02-28

## 2024-04-08 LAB
ABO GROUP (TYPE) IN BLOOD: NORMAL
ALBUMIN SERPL BCP-MCNC: 2.4 G/DL (ref 3.4–5)
ALBUMIN SERPL BCP-MCNC: 2.5 G/DL (ref 3.4–5)
ALP SERPL-CCNC: 300 U/L (ref 33–136)
ALT SERPL W P-5'-P-CCNC: 40 U/L (ref 10–52)
ANION GAP BLDA CALCULATED.4IONS-SCNC: 11 MMO/L (ref 10–25)
ANION GAP BLDA CALCULATED.4IONS-SCNC: 8 MMO/L (ref 10–25)
ANION GAP SERPL CALC-SCNC: 13 MMOL/L (ref 10–20)
ANION GAP SERPL CALC-SCNC: 15 MMOL/L (ref 10–20)
ANTIBODY SCREEN: NORMAL
APTT PPP: 31 SECONDS (ref 27–38)
AST SERPL W P-5'-P-CCNC: 163 U/L (ref 9–39)
BACTERIA BLD CULT: NORMAL
BACTERIA BLD CULT: NORMAL
BASE EXCESS BLDA CALC-SCNC: 0.5 MMOL/L (ref -2–3)
BASE EXCESS BLDA CALC-SCNC: 1 MMOL/L (ref -2–3)
BILIRUB DIRECT SERPL-MCNC: 18 MG/DL (ref 0–0.3)
BILIRUB SERPL-MCNC: 25.3 MG/DL (ref 0–1.2)
BODY TEMPERATURE: 37 DEGREES CELSIUS
BODY TEMPERATURE: 37 DEGREES CELSIUS
BUN SERPL-MCNC: 29 MG/DL (ref 6–23)
BUN SERPL-MCNC: 39 MG/DL (ref 6–23)
CA-I BLD-SCNC: 1.09 MMOL/L (ref 1.1–1.33)
CA-I BLD-SCNC: 1.14 MMOL/L (ref 1.1–1.33)
CA-I BLDA-SCNC: 1.11 MMOL/L (ref 1.1–1.33)
CA-I BLDA-SCNC: 1.17 MMOL/L (ref 1.1–1.33)
CALCIUM SERPL-MCNC: 7.8 MG/DL (ref 8.6–10.6)
CALCIUM SERPL-MCNC: 7.9 MG/DL (ref 8.6–10.6)
CHLORIDE BLDA-SCNC: 101 MMOL/L (ref 98–107)
CHLORIDE BLDA-SCNC: 105 MMOL/L (ref 98–107)
CHLORIDE SERPL-SCNC: 101 MMOL/L (ref 98–107)
CHLORIDE SERPL-SCNC: 102 MMOL/L (ref 98–107)
CO2 SERPL-SCNC: 23 MMOL/L (ref 21–32)
CO2 SERPL-SCNC: 26 MMOL/L (ref 21–32)
CREAT SERPL-MCNC: 1.15 MG/DL (ref 0.5–1.3)
CREAT SERPL-MCNC: 1.36 MG/DL (ref 0.5–1.3)
EGFRCR SERPLBLD CKD-EPI 2021: 59 ML/MIN/1.73M*2
EGFRCR SERPLBLD CKD-EPI 2021: 72 ML/MIN/1.73M*2
ERYTHROCYTE [DISTWIDTH] IN BLOOD BY AUTOMATED COUNT: 19.5 % (ref 11.5–14.5)
ERYTHROCYTE [DISTWIDTH] IN BLOOD BY AUTOMATED COUNT: 19.6 % (ref 11.5–14.5)
GLUCOSE BLD MANUAL STRIP-MCNC: 154 MG/DL (ref 74–99)
GLUCOSE BLD MANUAL STRIP-MCNC: 158 MG/DL (ref 74–99)
GLUCOSE BLD MANUAL STRIP-MCNC: 159 MG/DL (ref 74–99)
GLUCOSE BLD MANUAL STRIP-MCNC: 164 MG/DL (ref 74–99)
GLUCOSE BLD MANUAL STRIP-MCNC: 186 MG/DL (ref 74–99)
GLUCOSE BLDA-MCNC: 178 MG/DL (ref 74–99)
GLUCOSE BLDA-MCNC: 182 MG/DL (ref 74–99)
GLUCOSE SERPL-MCNC: 186 MG/DL (ref 74–99)
GLUCOSE SERPL-MCNC: 188 MG/DL (ref 74–99)
HCO3 BLDA-SCNC: 24.3 MMOL/L (ref 22–26)
HCO3 BLDA-SCNC: 25.1 MMOL/L (ref 22–26)
HCT VFR BLD AUTO: 27.1 % (ref 41–52)
HCT VFR BLD AUTO: 28 % (ref 41–52)
HCT VFR BLD EST: 29 % (ref 41–52)
HCT VFR BLD EST: 30 % (ref 41–52)
HGB BLD-MCNC: 9.6 G/DL (ref 13.5–17.5)
HGB BLD-MCNC: 9.7 G/DL (ref 13.5–17.5)
HGB BLDA-MCNC: 10.1 G/DL (ref 13.5–17.5)
HGB BLDA-MCNC: 9.5 G/DL (ref 13.5–17.5)
INHALED O2 CONCENTRATION: 0 %
INHALED O2 CONCENTRATION: 30 %
INR PPP: 1.3 (ref 0.9–1.1)
LACTATE BLDA-SCNC: 1.5 MMOL/L (ref 0.4–2)
LACTATE BLDA-SCNC: 1.7 MMOL/L (ref 0.4–2)
MAGNESIUM SERPL-MCNC: 2.03 MG/DL (ref 1.6–2.4)
MAGNESIUM SERPL-MCNC: 2.11 MG/DL (ref 1.6–2.4)
MCH RBC QN AUTO: 28.4 PG (ref 26–34)
MCH RBC QN AUTO: 28.6 PG (ref 26–34)
MCHC RBC AUTO-ENTMCNC: 34.6 G/DL (ref 32–36)
MCHC RBC AUTO-ENTMCNC: 35.4 G/DL (ref 32–36)
MCV RBC AUTO: 81 FL (ref 80–100)
MCV RBC AUTO: 82 FL (ref 80–100)
NRBC BLD-RTO: 0 /100 WBCS (ref 0–0)
NRBC BLD-RTO: 0.6 /100 WBCS (ref 0–0)
OXYHGB MFR BLDA: 96 % (ref 94–98)
OXYHGB MFR BLDA: 96 % (ref 94–98)
PCO2 BLDA: 35 MM HG (ref 38–42)
PCO2 BLDA: 37 MM HG (ref 38–42)
PH BLDA: 7.44 PH (ref 7.38–7.42)
PH BLDA: 7.45 PH (ref 7.38–7.42)
PHOSPHATE SERPL-MCNC: 1.5 MG/DL (ref 2.5–4.9)
PHOSPHATE SERPL-MCNC: 1.7 MG/DL (ref 2.5–4.9)
PLATELET # BLD AUTO: 58 X10*3/UL (ref 150–450)
PLATELET # BLD AUTO: 62 X10*3/UL (ref 150–450)
PO2 BLDA: 127 MM HG (ref 85–95)
PO2 BLDA: 79 MM HG (ref 85–95)
POTASSIUM BLDA-SCNC: 3.5 MMOL/L (ref 3.5–5.3)
POTASSIUM BLDA-SCNC: 3.6 MMOL/L (ref 3.5–5.3)
POTASSIUM SERPL-SCNC: 3.6 MMOL/L (ref 3.5–5.3)
POTASSIUM SERPL-SCNC: 3.6 MMOL/L (ref 3.5–5.3)
PROT SERPL-MCNC: 4.5 G/DL (ref 6.4–8.2)
PROTHROMBIN TIME: 14.7 SECONDS (ref 9.8–12.8)
RBC # BLD AUTO: 3.36 X10*6/UL (ref 4.5–5.9)
RBC # BLD AUTO: 3.41 X10*6/UL (ref 4.5–5.9)
RH FACTOR (ANTIGEN D): NORMAL
SAO2 % BLDA: 99 % (ref 94–100)
SAO2 % BLDA: 99 % (ref 94–100)
SODIUM BLDA-SCNC: 133 MMOL/L (ref 136–145)
SODIUM BLDA-SCNC: 134 MMOL/L (ref 136–145)
SODIUM SERPL-SCNC: 135 MMOL/L (ref 136–145)
SODIUM SERPL-SCNC: 137 MMOL/L (ref 136–145)
WBC # BLD AUTO: 3 X10*3/UL (ref 4.4–11.3)
WBC # BLD AUTO: 3.2 X10*3/UL (ref 4.4–11.3)

## 2024-04-08 PROCEDURE — 84132 ASSAY OF SERUM POTASSIUM: CPT

## 2024-04-08 PROCEDURE — 37799 UNLISTED PX VASCULAR SURGERY: CPT | Performed by: STUDENT IN AN ORGANIZED HEALTH CARE EDUCATION/TRAINING PROGRAM

## 2024-04-08 PROCEDURE — 2500000004 HC RX 250 GENERAL PHARMACY W/ HCPCS (ALT 636 FOR OP/ED): Performed by: NURSE PRACTITIONER

## 2024-04-08 PROCEDURE — 80069 RENAL FUNCTION PANEL: CPT | Mod: CCI

## 2024-04-08 PROCEDURE — 85610 PROTHROMBIN TIME: CPT

## 2024-04-08 PROCEDURE — 82947 ASSAY GLUCOSE BLOOD QUANT: CPT

## 2024-04-08 PROCEDURE — 71045 X-RAY EXAM CHEST 1 VIEW: CPT

## 2024-04-08 PROCEDURE — 97164 PT RE-EVAL EST PLAN CARE: CPT | Mod: GP

## 2024-04-08 PROCEDURE — 97168 OT RE-EVAL EST PLAN CARE: CPT | Mod: GO

## 2024-04-08 PROCEDURE — 82248 BILIRUBIN DIRECT: CPT

## 2024-04-08 PROCEDURE — 94003 VENT MGMT INPAT SUBQ DAY: CPT

## 2024-04-08 PROCEDURE — 99291 CRITICAL CARE FIRST HOUR: CPT | Performed by: STUDENT IN AN ORGANIZED HEALTH CARE EDUCATION/TRAINING PROGRAM

## 2024-04-08 PROCEDURE — 86920 COMPATIBILITY TEST SPIN: CPT

## 2024-04-08 PROCEDURE — 2020000001 HC ICU ROOM DAILY

## 2024-04-08 PROCEDURE — 86901 BLOOD TYPING SEROLOGIC RH(D): CPT | Performed by: STUDENT IN AN ORGANIZED HEALTH CARE EDUCATION/TRAINING PROGRAM

## 2024-04-08 PROCEDURE — 83735 ASSAY OF MAGNESIUM: CPT

## 2024-04-08 PROCEDURE — 99233 SBSQ HOSP IP/OBS HIGH 50: CPT | Performed by: STUDENT IN AN ORGANIZED HEALTH CARE EDUCATION/TRAINING PROGRAM

## 2024-04-08 PROCEDURE — 97530 THERAPEUTIC ACTIVITIES: CPT | Mod: GP

## 2024-04-08 PROCEDURE — 2500000002 HC RX 250 W HCPCS SELF ADMINISTERED DRUGS (ALT 637 FOR MEDICARE OP, ALT 636 FOR OP/ED): Performed by: STUDENT IN AN ORGANIZED HEALTH CARE EDUCATION/TRAINING PROGRAM

## 2024-04-08 PROCEDURE — C9113 INJ PANTOPRAZOLE SODIUM, VIA: HCPCS | Performed by: NURSE PRACTITIONER

## 2024-04-08 PROCEDURE — 99232 SBSQ HOSP IP/OBS MODERATE 35: CPT | Performed by: PHYSICIAN ASSISTANT

## 2024-04-08 PROCEDURE — 71045 X-RAY EXAM CHEST 1 VIEW: CPT | Performed by: RADIOLOGY

## 2024-04-08 PROCEDURE — 2500000001 HC RX 250 WO HCPCS SELF ADMINISTERED DRUGS (ALT 637 FOR MEDICARE OP): Performed by: NURSE PRACTITIONER

## 2024-04-08 PROCEDURE — 2500000005 HC RX 250 GENERAL PHARMACY W/O HCPCS: Performed by: STUDENT IN AN ORGANIZED HEALTH CARE EDUCATION/TRAINING PROGRAM

## 2024-04-08 PROCEDURE — 2500000004 HC RX 250 GENERAL PHARMACY W/ HCPCS (ALT 636 FOR OP/ED): Performed by: STUDENT IN AN ORGANIZED HEALTH CARE EDUCATION/TRAINING PROGRAM

## 2024-04-08 PROCEDURE — 2500000004 HC RX 250 GENERAL PHARMACY W/ HCPCS (ALT 636 FOR OP/ED): Mod: JZ | Performed by: NURSE PRACTITIONER

## 2024-04-08 PROCEDURE — 82330 ASSAY OF CALCIUM: CPT | Performed by: STUDENT IN AN ORGANIZED HEALTH CARE EDUCATION/TRAINING PROGRAM

## 2024-04-08 PROCEDURE — 85027 COMPLETE CBC AUTOMATED: CPT

## 2024-04-08 RX ORDER — INSULIN GLARGINE 100 [IU]/ML
8 INJECTION, SOLUTION SUBCUTANEOUS EVERY 12 HOURS
Status: DISCONTINUED | OUTPATIENT
Start: 2024-04-08 | End: 2024-04-08

## 2024-04-08 RX ORDER — PANTOPRAZOLE SODIUM 40 MG/10ML
40 INJECTION, POWDER, LYOPHILIZED, FOR SOLUTION INTRAVENOUS DAILY
Status: DISCONTINUED | OUTPATIENT
Start: 2024-04-09 | End: 2024-04-18

## 2024-04-08 RX ORDER — INSULIN GLARGINE 100 [IU]/ML
8 INJECTION, SOLUTION SUBCUTANEOUS ONCE
Status: COMPLETED | OUTPATIENT
Start: 2024-04-08 | End: 2024-04-08

## 2024-04-08 RX ORDER — MEROPENEM 1 G/1
1 INJECTION, POWDER, FOR SOLUTION INTRAVENOUS EVERY 8 HOURS
Status: DISCONTINUED | OUTPATIENT
Start: 2024-04-08 | End: 2024-04-13

## 2024-04-08 RX ADMIN — HEPARIN SODIUM 5000 UNITS: 5000 INJECTION INTRAVENOUS; SUBCUTANEOUS at 10:20

## 2024-04-08 RX ADMIN — MIDODRINE HYDROCHLORIDE 15 MG: 5 TABLET ORAL at 09:02

## 2024-04-08 RX ADMIN — HEPARIN SODIUM 5000 UNITS: 5000 INJECTION INTRAVENOUS; SUBCUTANEOUS at 19:33

## 2024-04-08 RX ADMIN — HYDROCORTISONE SODIUM SUCCINATE 25 MG: 100 INJECTION, POWDER, FOR SOLUTION INTRAMUSCULAR; INTRAVENOUS at 10:19

## 2024-04-08 RX ADMIN — INSULIN LISPRO 4 UNITS: 100 INJECTION, SOLUTION INTRAVENOUS; SUBCUTANEOUS at 16:31

## 2024-04-08 RX ADMIN — INSULIN LISPRO 4 UNITS: 100 INJECTION, SOLUTION INTRAVENOUS; SUBCUTANEOUS at 11:59

## 2024-04-08 RX ADMIN — INSULIN LISPRO 4 UNITS: 100 INJECTION, SOLUTION INTRAVENOUS; SUBCUTANEOUS at 00:40

## 2024-04-08 RX ADMIN — MIDODRINE HYDROCHLORIDE 15 MG: 5 TABLET ORAL at 17:58

## 2024-04-08 RX ADMIN — INSULIN GLARGINE 16 UNITS: 100 INJECTION, SOLUTION SUBCUTANEOUS at 08:40

## 2024-04-08 RX ADMIN — PANTOPRAZOLE SODIUM 40 MG: 40 INJECTION, POWDER, FOR SOLUTION INTRAVENOUS at 08:39

## 2024-04-08 RX ADMIN — INSULIN LISPRO 4 UNITS: 100 INJECTION, SOLUTION INTRAVENOUS; SUBCUTANEOUS at 03:21

## 2024-04-08 RX ADMIN — INSULIN LISPRO 4 UNITS: 100 INJECTION, SOLUTION INTRAVENOUS; SUBCUTANEOUS at 19:48

## 2024-04-08 RX ADMIN — FLUDROCORTISONE ACETATE 0.1 MG: 0.1 TABLET ORAL at 13:27

## 2024-04-08 RX ADMIN — Medication 1 G: at 16:31

## 2024-04-08 RX ADMIN — INSULIN LISPRO 4 UNITS: 100 INJECTION, SOLUTION INTRAVENOUS; SUBCUTANEOUS at 08:40

## 2024-04-08 RX ADMIN — MIDODRINE HYDROCHLORIDE 15 MG: 5 TABLET ORAL at 02:59

## 2024-04-08 RX ADMIN — INSULIN GLARGINE 8 UNITS: 100 INJECTION, SOLUTION SUBCUTANEOUS at 20:31

## 2024-04-08 RX ADMIN — HEPARIN SODIUM 5000 UNITS: 5000 INJECTION INTRAVENOUS; SUBCUTANEOUS at 03:05

## 2024-04-08 RX ADMIN — POTASSIUM PHOSPHATE, MONOBASIC AND POTASSIUM PHOSPHATE, DIBASIC 21 MMOL: 224; 236 INJECTION, SOLUTION, CONCENTRATE INTRAVENOUS at 13:27

## 2024-04-08 RX ADMIN — Medication 1 G: at 08:39

## 2024-04-08 RX ADMIN — FLUDROCORTISONE ACETATE 0.1 MG: 0.1 TABLET ORAL at 00:26

## 2024-04-08 RX ADMIN — CALCIUM GLUCONATE 1 G: 20 INJECTION, SOLUTION INTRAVENOUS at 03:05

## 2024-04-08 ASSESSMENT — PAIN SCALES - GENERAL
PAINLEVEL_OUTOF10: 0 - NO PAIN

## 2024-04-08 ASSESSMENT — ACTIVITIES OF DAILY LIVING (ADL)
BATHING_ASSISTANCE: TOTAL
ADL_ASSISTANCE: INDEPENDENT
ADL_ASSISTANCE: INDEPENDENT

## 2024-04-08 ASSESSMENT — COGNITIVE AND FUNCTIONAL STATUS - GENERAL
MOVING TO AND FROM BED TO CHAIR: TOTAL
PERSONAL GROOMING: TOTAL
TOILETING: TOTAL
WALKING IN HOSPITAL ROOM: TOTAL
DRESSING REGULAR UPPER BODY CLOTHING: TOTAL
DRESSING REGULAR LOWER BODY CLOTHING: TOTAL
EATING MEALS: TOTAL
MOVING FROM LYING ON BACK TO SITTING ON SIDE OF FLAT BED WITH BEDRAILS: TOTAL
DAILY ACTIVITIY SCORE: 6
MOBILITY SCORE: 6
STANDING UP FROM CHAIR USING ARMS: TOTAL
TURNING FROM BACK TO SIDE WHILE IN FLAT BAD: TOTAL
CLIMB 3 TO 5 STEPS WITH RAILING: TOTAL
HELP NEEDED FOR BATHING: TOTAL

## 2024-04-08 ASSESSMENT — PAIN - FUNCTIONAL ASSESSMENT
PAIN_FUNCTIONAL_ASSESSMENT: 0-10
PAIN_FUNCTIONAL_ASSESSMENT: CPOT (CRITICAL CARE PAIN OBSERVATION TOOL)
PAIN_FUNCTIONAL_ASSESSMENT: 0-10
PAIN_FUNCTIONAL_ASSESSMENT: CPOT (CRITICAL CARE PAIN OBSERVATION TOOL)
PAIN_FUNCTIONAL_ASSESSMENT: CPOT (CRITICAL CARE PAIN OBSERVATION TOOL)

## 2024-04-08 NOTE — PROGRESS NOTES
Occupational Therapy    RE-Evaluation    Patient Name: Jason Hollins  MRN: [id number]  Today's Date: 4/8/2024  Time Calculation  Start Time: 1227  Stop Time: 1418  Time Calculation (min): 111 min    In room 12:27-12:40 however waited to mobilize until wife was present (UPON REQUEST); returned from 13:49-14:18 to mobilize patient to EOB; Total billable time 42 min     Assessment  IP OT Assessment  OT Assessment: Patient will benefit from continued OT treatment to improve balance, endurance, and ADL completion  End of Session Communication: Bedside nurse  End of Session Patient Position: Bed, 4 rail up, Alarm off, not on at start of session  Plan:  Treatment Interventions: ADL retraining, Functional transfer training, Patient/family training  OT Frequency: 4 times per week  OT Discharge Recommendations: Moderate intensity level of continued care    Subjective   Current Problem:  1. Shock (CMS/HCC)  Transthoracic Echo (TTE) Limited    Transthoracic Echo (TTE) Limited    Intubation    Intubation    EEG    Central Line    Central Line    Transthoracic Echo (TTE) Limited    Transthoracic Echo (TTE) Limited    Arterial Puncture/Cannulation    Arterial Puncture/Cannulation      2. Extraskeletal myxoid chondrosarcoma (CMS/HCC)        3. Soft tissue sarcoma (CMS/HCC)  Surgical Pathology Exam    Surgical Pathology Exam    Case Request Operating Room: Femoral Artery Repair/Profundoplasty w/Angiogram    Case Request Operating Room: Femoral Artery Repair/Profundoplasty w/Angiogram      4. Type 2 diabetes mellitus with hyperglycemia, without long-term current use of insulin (CMS/HCC)  Oral nutritional supplements      5. Cardiopulmonary arrest (CMS/HCC)  Intubation    Airway    Case Request Cath Lab: Pulmonary Angiogram    Case Request Cath Lab: Pulmonary Angiogram    Invasive vascular procedure    Invasive vascular procedure    Case Request Operating Room: Femoral Artery Repair/Profundoplasty w/Angiogram    Case Request Operating Room:  Femoral Artery Repair/Profundoplasty w/Angiogram    Central Line    Central Line    Transthoracic Echo (TTE) Limited    Transthoracic Echo (TTE) Limited      6. Cardiac arrest (CMS/HCC)  Transthoracic Echo (TTE) Limited    Transthoracic Echo (TTE) Limited    Central Line    Central Line    Arterial Puncture/Cannulation    Arterial Puncture/Cannulation    Intubation    Intubation    CANCELED: Lower extremity venous duplex bilateral    CANCELED: Lower extremity venous duplex bilateral      7. Other pulmonary embolism without acute cor pulmonale (CMS/HCC)  Transthoracic Echo (TTE) Limited    Lower extremity venous duplex bilateral      8. Acute saddle pulmonary embolism with acute cor pulmonale (CMS/HCC)  Lower extremity venous duplex bilateral    Upper extremity venous duplex bilateral    Lower extremity venous duplex bilateral    Upper extremity venous duplex bilateral    Intubation    Intubation      9. Cardiac arrest due to other underlying condition (CMS/HCC)  Invasive vascular procedure      10. Cardiogenic shock (CMS/HCC)  Invasive vascular procedure      11. Other specified soft tissue disorders  Upper extremity venous duplex bilateral      12. Other specified soft tissue disorders  Upper extremity venous duplex bilateral      13. RUTH (acute kidney injury) (CMS/HCC)  Central Line    Central Line      14. Neoplastic malignant related fatigue        15. Acute respiratory failure with hypoxia (CMS/HCC)  Case Request Operating Room: Creation Tracheostomy    Case Request Operating Room: Creation Tracheostomy      16. Tracheostomy hemorrhage (CMS/HCC)  Case Request Operating Room: Direct Laryngoscopy, Bronchoscopy, Esophagoscopy, possible control of hemorrhage    Case Request Operating Room: Direct Laryngoscopy, Bronchoscopy, Esophagoscopy, possible control of hemorrhage      17. Postoperative wound breakdown, initial encounter  Case Request Operating Room: Incision and Drainage Thigh, Closure Surgical Wound Lower  Extremity, Wound Vac Application / Change    Case Request Operating Room: Incision and Drainage Thigh, Closure Surgical Wound Lower Extremity, Wound Vac Application / Change        General:  General  Reason for Referral: OT re-eval: 3/20: Cardiopulmonary arrest s/p CPR with ROSC after TPA, overnight started on heparin, epo, increased pressor requirements, increased swelling at flap site.  3/21: Hemorrhagic shock, MTP. Firm and large right flap site. Return to OR s/p Exploration of right thigh wound, Evacuation of hematoma. Suture ligation of multiple bleeding vessel and several points in gluteal area. 4/1: s/p Trach  Past Medical History Relevant to Rehab: DM II, HLD, HTN, right gluteal extraskeletal myxoid chondrosarcoma  Family/Caregiver Present: Yes  Caregiver Feedback: Initially neice present at bedside however waited for complete session until wife present at bedside.  Returned to room at a later time for rest of session.  Co-Treatment: PT  Co-Treatment Reason: To maximize pt safety  Prior to Session Communication: Bedside nurse  Patient Position Received: Bed, 4 rail up, Alarm off, not on at start of session  Preferred Learning Style: auditory, verbal  General Comment: Pt awake, alert and willing to participate in PT session.  Pt able to nod head yes/no and give thumbs up to answers.  Precautions:  Hearing/Visual Limitations: Hearing appeared WFL  LE Weight Bearing Status:  (no right hip flexion, right knee flexion ok, right hip ABD/ADD ok, NWB RLE)  Medical Precautions: Fall precautions  Precautions Comment: MAP 65-90    In room 12:27-12:40 however waited to mobilize until wife was present; returned from 13:49-14:18 to mobilize patient to EOB; Total billable time 42 min       Vital Signs:  Heart Rate: 87  Resp: (!) 30  SpO2: 99 %  BP: (!) 136/45  MAP (mmHg): 68  Pain:  Pain Assessment  Pain Assessment: 0-10  Pain Score: 0 - No pain    Objective   Cognition:  Overall Cognitive Status:  (Oriented to self,  "month. and location with choices; patient nodded yes to \"2022\" as year and was provided re-orientation)  Arousal/Alertness: Delayed responses to stimuli  Following Commands: Follows one step commands with increased time           Home Living:  Type of Home: House  Lives With: Spouse  Home Adaptive Equipment: Cane  Home Layout: Two level, Stairs to alternate level with rails  Alternate Level Stairs-Rails: Left  Home Access: Stairs to enter without rails  Entrance Stairs-Rails: None  Bathroom Shower/Tub: Tub/shower unit, Walk-in shower  Bathroom Toilet: Standard  Bathroom Equipment: None   Prior Function:  Level of [place]: Independent with ADLs and functional transfers, Independent with homemaking with ambulation  Receives Help From: Family  ADL Assistance: Independent  Homemaking Assistance: Independent  Ambulatory Assistance: Independent  Vocational: Full time employment  Prior Function Comments: PLOF obtained from previous eval  IADL History:     ADL:  Eating Assistance: Total (Anticipated)  Grooming Assistance: Total (Anticipated)  Bathing Assistance: Total (Anticipated)  UE Dressing Assistance: Total (Anticipated)  LE Dressing Assistance: Total (Anticipated)  Toileting Assistance with Device: Total (Anticipated)  Activity Tolerance:  Endurance: Tolerates less than 10 min exercise, no significant change in vital signs  Bed Mobility/Transfers: Bed Mobility 1  Bed Mobility 1: Supine to sitting, Sitting to supine  Level of Assistance 1: Dependent (x3)  Bed Mobility Comments 1: signifiant time required to complere  Bed Mobility 2  Bed Mobility  2:  (Boost toward HOB)  Level of Assistance 2: Dependent (x3)    Transfers  Transfer: No    Sitting Balance:  Static Sitting Balance  Static Sitting-Balance Support:  (Patient seated EOB ~4-5 min with total A x2-3 to remain seated)  Modalities:     IADL's:      Vision: Vision - Basic Assessment  Current Vision:  (unable to assess)  Sensation:  Light Touch: No apparent " deficits  Strength:  Strength Comments: B  strength grossly 3-/5  Perception:     Coordination:      Hand Function:  Hand Function  Gross Grasp: Impaired  Coordination: Impaired  Extremities: RUE   RUE :  (RUE with trace shoulder/elbow flexion however limiited due to weakness/swelling) and LUE   LUE:  (LUE with trace shoulder/elbow flexion however limiited due to weakness/swelling)      Outcome Measures: [facility] Daily Activity  Putting on and taking off regular lower body clothing: Total  Bathing (including washing, rinsing, drying): Total  Putting on and taking off regular upper body clothing: Total  Toileting, which includes using toilet, bedpan or urinal: Total  Taking care of personal grooming such as brushing teeth: Total  Eating Meals: Total  Daily Activity - Total Score: 6      Education Documentation  Body Mechanics, taught by Amanda Bond OT at 4/8/2024  4:14 PM.  Learner: Patient  Readiness: Acceptance  Method: Explanation  Response: Needs Reinforcement    Precautions, taught by Amanda Bond OT at 4/8/2024  4:14 PM.  Learner: Patient  Readiness: Acceptance  Method: Explanation  Response: Needs Reinforcement    ADL Training, taught by Amanda Bond OT at 4/8/2024  4:14 PM.  Learner: Patient  Readiness: Acceptance  Method: Explanation  Response: Needs Reinforcement    Education Comments  No comments found.      Goals:   Encounter Problems       Encounter Problems (Active)       ADLs       Patient will perform UB and LB bathing  with stand by assist level of assistance and AE. (Not met)       Start:  03/19/24    Expected End:  04/09/24    Resolved:  03/28/24    Updated to: Patient will perform UB and LB bathing  with MAX assist level of assistance and AE.    Update reason: change in functional status         Patient with complete upper body dressing with independent level  (Not met)       Start:  03/19/24    Expected End:  04/09/24    Resolved:  03/28/24    Updated to: Patient with  complete upper body dressing with MAX A    Update reason: change in functional status         Patient with complete lower body dressing with minimal assist  level of assistance donning and doffing all LE clothes  with PRN adaptive equipment  (Not met)       Start:  03/19/24    Expected End:  04/09/24    Resolved:  03/28/24    Updated to: Patient with complete lower body dressing with MAX assist  level of assistance donning and doffing all LE clothes  with PRN adaptive equipment    Update reason: change in functional status         Patient will complete daily grooming tasks  with independent level  (Not met)       Start:  03/19/24    Expected End:  04/09/24    Resolved:  03/28/24    Updated to: Patient will complete daily grooming tasks with MAX A.    Update reason: change in functional status         Patient will complete toileting including hygiene clothing management/hygiene with moderate assist level of assistance and LRD. (Not met)       Start:  03/19/24    Expected End:  04/09/24    Resolved:  03/28/24    Updated to: Patient will complete toileting including hygiene clothing management/hygiene with MAX assist level of assistance and LRD.    Update reason: change in functional status         Patient will perform UB and LB bathing  with MAX assist level of assistance and AE. (Progressing)       Start:  03/28/24    Expected End:  04/11/24                Patient with complete upper body dressing with MAX A (Progressing)       Start:  03/28/24    Expected End:  04/11/24                Patient with complete lower body dressing with MAX assist  level of assistance donning and doffing all LE clothes  with PRN adaptive equipment (Progressing)       Start:  03/28/24    Expected End:  04/11/24                Patient will complete daily grooming tasks with MAX A. (Progressing)       Start:  03/28/24    Expected End:  04/11/24                Patient will complete toileting including hygiene clothing management/hygiene with  MAX assist level of assistance and LRD. (Progressing)       Start:  03/28/24    Expected End:  04/11/24                   EXERCISE/STRENGTHENING       Patient with increase BUE to WFL strength. (Not met)       Start:  03/19/24    Expected End:  04/09/24    Resolved:  03/28/24    Updated to: Patient with increase BUE to 2/5 strength.    Update reason: change in functional status         Patient with increase BUE to 2/5 strength. (Progressing)       Start:  03/28/24    Expected End:  04/11/24                   TRANSFERS       Patient will perform bed mobility moderate assist level of assistance and bed rails in order to improve safety and independence with mobility (Not met)       Start:  03/19/24    Expected End:  04/09/24    Resolved:  03/28/24    Updated to: Patient will perform bed mobility moderate assist x2 level of assistance and bed rails in order to improve safety and independence with mobility    Update reason: change in functional status         Patient will complete functional transfer with least restrictive device with moderate assist level of assistance. (Not met)       Start:  03/19/24    Expected End:  04/09/24    Resolved:  03/28/24    Updated to: Patient will complete functional transfer with least restrictive device with moderate assist x2 level of assistance.    Update reason: change in functional status         Patient will perform bed mobility moderate assist x2 level of assistance and bed rails in order to improve safety and independence with mobility (Progressing)       Start:  03/28/24    Expected End:  04/11/24                Patient will complete functional transfer with least restrictive device with moderate assist x2 level of assistance. (Progressing)       Start:  03/28/24    Expected End:  04/11/24                      Encounter Problems (Resolved)       MOBILITY       Patient will perform Functional mobility min Household distances/Community Distances with moderate assist level of  assistance and least restrictive device in order to improve safety and functional mobility. (Not met)       Start:  03/19/24    Expected End:  04/09/24    Resolved:  03/28/24

## 2024-04-08 NOTE — PROGRESS NOTES
Department of Plastic and Reconstructive Surgery  Daily Progress Note    Patient Name: Jason Hollins  MRN: [id number]  Date:  04/08/24     Subjective   Remains critically ill in ICU, currently off pressors. Responsive to commands, moving 3/4 extremities (continues to have little to no RLE movement). Incisional wound vac intact to posterior R thigh, without issue overnight. Plan for return to OR 4/9 as clinical stability allows for debridement of darkened/discolored tissue at R posterior thigh. Family at bedside updated on plan of care and all questions addressed.       Objective   Vitals:    04/08/24 1000   BP:    Pulse: 84   Resp: 26   Temp:    SpO2: 98%     Physical Exam   Constitutional: Alert, lying in pressure relieving bed in ICU, appears critically ill.   ENMT: MMM, dobhoff in R nare.  Cardiovascular: RRR on telemetry monitor.  Respiratory/Thorax: Trach to vent.  Gastrointestinal: Abdomen soft, protuberant.   Genitourinary: SLED.  Musculoskeletal: Able to squeeze bilateral hands, wiggles toes on the left.   Extremities: Generalized pitting edema. Right thigh edematous, but soft and compressible, no bruising or tissue induration noted. Flap recipient site and adjacent anterior R thigh incisions appear stable, interval slight development of expressible serous drainage from anterior incision line at flap edge approximation. Incision and native tissue overlying R posterior thigh (previously noted with maceration and darkened discoloration) dressed with intact incisional wound vac dressing, maintaining low continuous suction at -75mmHg, no alarms for leak, obstruction or malfunction, expectantly no output in collecting canister. ANDERSON drain x 3 right upper leg with dark serous outputs.  Neurological: Off sedation, alert and able to respond to commands with head nod.   Skin: Edematous, juandice, warm.     Current Medications  Scheduled medications  fludrocortisone, 0.1 mg, nasogastric tube, q12h  heparin (porcine), 5,000  Units, subcutaneous, q8h  hydrocortisone sodium succinate, 25 mg, intravenous, q12h  insulin glargine, 16 Units, subcutaneous, q12h  insulin lispro, 0-20 Units, subcutaneous, q4h  meropenem, 1 g, intravenous, q8h  midodrine, 15 mg, orogastric tube, q8h  oxygen, , inhalation, Continuous - Inhalation  [START ON 4/9/2024] pantoprazole, 40 mg, intravenous, Daily    Continuous medications  lactated Ringer's, 5 mL/hr, Last Rate: 5 mL/hr (04/08/24 1000)    PRN medications  PRN medications: alteplase, calcium gluconate, calcium gluconate, dextrose **OR** glucagon, magnesium sulfate, magnesium sulfate, sodium chloride     Assessment   Jason Hollins 63 y.o. 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 Hawkins and Dr. Smith on 3/5/24. Postoperative course c/b arrest requiring CPR with ROSC after TPA for assumed large PE on 3/20. Increased swelling at flap site appreciated overnight 3/20-3/21 and pt returned to OR for exploration of R flap site, evacuation of hematoma, suture ligation of multiple bleeding vessel sites in gluteal area and wound closure with Dr. Smith on 3/21. Pt has remained in ICU for continued critical care evaluation and management postoperatively.     Plan/Recommendations  - Anticipate return to OR with plastic surgery tentatively on Tuesday 4/9 as clinical stability permits for R posterior thigh tissue I&D with possible tissue advancement and complex closure        ·   Appreciate documentation of clearance for return to OR per ICU care team        ·   Please ensure TF are held at [facility] in prep for OR       ·   Updated T&S ordered as of 4/8  - Maintain incisional wound vac to R posterior thigh wound site per plastic surgery         ·  Settings: -75 mmHg low continuous suction        ·  Dressing to be maintained until  tentative return to OR 4/9       ·  If unable to return to OR 4/9, will follow up timing of next dressing change/removal        ·  Monitor/record vac canister output [id number]       ·  Notify plastic surgery with any concerns of leak or obstruction   - Monitor right thigh for s/s of bleeding recurrence or worsening progression, has been stable on serial assessments over past week   - Continue ANDERSON drain care to x3 drains present at R thigh flap reconstruction site      ·  Strip drain tubing TID and PRN     ·  Monitor and record output q8h      ·  Keep drain sites C/D/I      ·  Notify plastics if ANDERSON drain output exceeds > 100 ml/hr in one drain or > 300 ml/hr collectively  - Avoid pressure application or positioning onto flap site or incisions at R upper leg   - Recommend patient be positioned off of R side as able to prevent flap compression/wound breakdown   - Continue clinitron fluidized air mattress  - Appreciate remaining supportive care per ICU   - Plastic Surgery will continue to follow     Patient and plan discussed with Dr. Smith.     Gracie Smith PA-C  Plastic and Reconstructive Surgery   Pager #[phone]  Team phones: [phone]

## 2024-04-08 NOTE — PROGRESS NOTES
Jason Hollins is a 63 y.o. male on day 34 of admission presenting with Soft tissue sarcoma (CMS/HCC).      Subjective   4/8: S/p SLED overnight. Was hypotensive briefly so got IV albumin. Not currently on vasopressor. Trach to vent FiO2 40%. No urine output. Niece at bedside.        Objective          Vitals 24HR  Heart Rate:  [62-91]   Temp:  [36.2 °C (97.2 °F)-36.9 °C (98.4 °F)]   Resp:  [15-30]   SpO2:  [97 %-100 %]         Intake/Output last 3 Shifts:    Intake/Output Summary (Last 24 hours) at 4/8/2024 1141  Last data filed at 4/8/2024 1100  Gross per 24 hour   Intake 1280 ml   Output 345 ml   Net 935 ml         Physical Exam  General appearance: intubated, trached  Heart: RRR  Lungs: FiO2 40% on vent via trach  : no [place]  ACCESS:  right I J trialysis    Current Facility-Administered Medications:     alteplase (Cathflo Activase) injection 2 mg, 2 mg, intra-catheter, PRN, Gracie DUNBAR Uljanic, APRN-CNP    calcium gluconate in NS IV 1 g, 1 g, intravenous, q6h PRN, Gracie C Amiejanic, APRN-CNP, Stopped at 04/08/24 0335    calcium gluconate in NS IV 2 g, 2 g, intravenous, q6h PRN, Gracie C Uljanic, APRN-CNP, Last Rate: 100 mL/hr at 04/04/24 1118, 2 g at 04/04/24 1118    dextrose 50 % injection 25 g, 25 g, intravenous, q15 min PRN **OR** glucagon (Glucagen) injection 1 mg, 1 mg, intramuscular, q15 min PRN, Gracie C Uljanic, APRN-CNP    fludrocortisone (Florinef) tablet 0.1 mg, 0.1 mg, nasogastric tube, q12h, Gracie Lozadaic, APRN-CNP, 0.1 mg at 04/08/24 0026    heparin (porcine) injection 5,000 Units, 5,000 Units, subcutaneous, q8h, Fernandez Ríos, PHILLIP-CNP, DNP, 5,000 Units at 04/08/24 1020    insulin glargine (Lantus) injection 8 Units, 8 Units, subcutaneous, Once, Mariluz Dailey MD    insulin lispro (HumaLOG) injection 0-20 Units, 0-20 Units, subcutaneous, q4h, Gracie Skaggs, APRN-CNP, 4 Units at 04/08/24 0840    lactated Ringer's infusion, 5 mL/hr, intravenous, Continuous, Gordon Nieves,  MD, Last Rate: 5 mL/hr at 04/08/24 1100, 5 mL/hr at 04/08/24 1100    magnesium sulfate IV 2 g, 2 g, intravenous, q6h PRN, RAQUEL Bernal, Stopped at 03/24/24 1726    magnesium sulfate IV 4 g, 4 g, intravenous, q6h PRN, RAQUEL Bernal, Stopped at 04/05/24 0838    meropenem (Merrem) in sodium chloride 0.9 % 100 mL IV 1 g, 1 g, intravenous, q8h, Mariluz Dailey MD, Stopped at 04/08/24 0909    midodrine (Proamatine) tablet 15 mg, 15 mg, orogastric tube, q8h, RAQUEL Barr, DNP, 15 mg at 04/08/24 0902    oxygen (O2) therapy, , inhalation, Continuous - Inhalation, Mariluz Dailey MD, Rate Verify at 04/08/24 1105    [START ON 4/9/2024] pantoprazole (ProtoNix) injection 40 mg, 40 mg, intravenous, Daily, Alesha Mckeon PA-C    potassium phosphates 21 mmol in dextrose 5 % in water (D5W) 250 mL IV, 21 mmol, intravenous, Once, Mariluz Dailey MD    sodium chloride (Ocean) 0.65 % nasal spray 1 spray, 1 spray, Each Nostril, 4x daily PRN, Osman Perkins MD      Assessment/Plan   Jason Hollins is a 63 y.o. 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 Hawkins and Dr. Smtih on 3/5/24. On 3/20, he developed massive PE and was given TPA, taken to OR in setting of increased swelling at flap site- hematoma for exploration and evacuation. Nephrology following for RUTH.    #Acute Kidney Injury on Dialysis (RUTH-D), anuric  - etiology hemodynamic from hemorrhagic shock  - began on CVVH since 3/21 via R I J trialysis and transitioned to SLED on 4/2  - Cr baseline 0.6  - electrolytes: hypophos 1.5; K and HCO3 WNL  - anuric. Bladder scan 43mL; I/O: +0.8L  - FiO2 40% on vent - trach  - currently off vasopressor  - s/p SLED 4/7-4/8 - was hypotensive    Myxoid Chondrosarcoma s/p wide resection  3/5/2024  Thrombocytopenia    Plan  - hold off on dialysis today; will re-evaluate tomorrow  - replace phosphorus  - continue bladder scan once per shift to monitor for renal recovery    D/w Dr. Yolanda Duffy MD  Nephrology Fellow PGY 5  Contact via secure chat  Nephrology Pager # [phone] ([phone])

## 2024-04-08 NOTE — PROGRESS NOTES
"Jason Hollins is a 63 y.o. male on day 34 of admission presenting with Soft tissue sarcoma (CMS/HCC).    Subjective   Overnight, patient received SLED with 2 L removed.  Patient was mildly hypotensive, so received 12.5 g 25% albumin x 1.  Today, patient is alert, able to respond with head nod.  Reports no significant pain.       Objective     Physical Exam  Constitutional:       General: He is not in acute distress.  Eyes:      Extraocular Movements: Extraocular movements intact.   Cardiovascular:      Rate and Rhythm: Normal rate and regular rhythm.   Pulmonary:      Effort: No respiratory distress.      Comments: Bilateral crackles and diminished breath sounds auscultated  Abdominal:      General: Abdomen is flat. There is no distension.      Palpations: Abdomen is soft.   Musculoskeletal:      Comments: 2+ pitting edema noted bilaterally lower extremities.  Surgical drains in place in right lower extremity with surrounding areas clean, dry, intact   Skin:     General: Skin is warm.   Neurological:      Mental Status: He is alert.      Comments: Alert, tracking with eyes, able to respond to verbal commands with head nod.  Thumbs up and squeezes hands bilaterally and moves toes on left lower extremity.  Does not move toes and right lower extremity (consistent with prior exam)         Last Recorded Vitals  Blood pressure 122/55, pulse 65, temperature 36.5 °C (97.7 °F), temperature source Temporal, resp. rate 18, height 1.778 m (5' 10\"), weight 125 kg (275 lb 9.2 oz), SpO2 99 %.  Intake/Output last 3 Shifts:  I/O last 3 completed shifts:  In: 2240 (17.9 mL/kg) [I.V.:180 (1.4 mL/kg); NG/GT:1610; IV Piggyback:450]  Out: 460 (3.7 mL/kg) [Drains:460]  Weight: 125 kg     Relevant Results  Scheduled medications  fludrocortisone, 0.1 mg, nasogastric tube, q12h  heparin (porcine), 5,000 Units, subcutaneous, q8h  hydrocortisone sodium succinate, 25 mg, intravenous, q12h  insulin glargine, 16 Units, subcutaneous, q12h  insulin " lispro, 0-20 Units, subcutaneous, q4h  meropenem, 1 g, intravenous, q8h  midodrine, 15 mg, orogastric tube, q8h  oxygen, , inhalation, Continuous - Inhalation  pantoprazole, 40 mg, intravenous, BID      Continuous medications  lactated Ringer's, 5 mL/hr, Last Rate: 5 mL/hr (04/08/24 0700)      PRN medications  PRN medications: alteplase, calcium gluconate, calcium gluconate, dextrose **OR** glucagon, magnesium sulfate, magnesium sulfate, sodium chloride  Results for orders placed or performed during the hospital encounter of 03/05/24 (from the past 24 hour(s))   POCT GLUCOSE   Result Value Ref Range    POCT Glucose 246 (H) 74 - 99 mg/dL   Blood Gas Arterial Full Panel   Result Value Ref Range    POCT pH, Arterial 7.38 7.38 - 7.42 pH    POCT pCO2, Arterial 40 38 - 42 mm Hg    POCT pO2, Arterial 122 (H) 85 - 95 mm Hg    POCT SO2, Arterial 100 94 - 100 %    POCT Oxy Hemoglobin, Arterial 97.0 94.0 - 98.0 %    POCT Hematocrit Calculated, Arterial 30.0 (L) 41.0 - 52.0 %    POCT Sodium, Arterial 131 (L) 136 - 145 mmol/L    POCT Potassium, Arterial 3.6 3.5 - 5.3 mmol/L    POCT Chloride, Arterial 99 98 - 107 mmol/L    POCT Ionized Calcium, Arterial 1.12 1.10 - 1.33 mmol/L    POCT Glucose, Arterial 214 (H) 74 - 99 mg/dL    POCT Lactate, Arterial 2.0 0.4 - 2.0 mmol/L    POCT Base Excess, Arterial -1.3 -2.0 - 3.0 mmol/L    POCT HCO3 Calculated, Arterial 23.7 22.0 - 26.0 mmol/L    POCT Hemoglobin, Arterial 10.0 (L) 13.5 - 17.5 g/dL    POCT Anion Gap, Arterial 12 10 - 25 mmo/L    Patient Temperature 37.0 degrees Celsius    FiO2 50 %   Blood Gas Arterial Full Panel   Result Value Ref Range    POCT pH, Arterial 7.40 7.38 - 7.42 pH    POCT pCO2, Arterial 40 38 - 42 mm Hg    POCT pO2, Arterial 87 85 - 95 mm Hg    POCT SO2, Arterial 100 94 - 100 %    POCT Oxy Hemoglobin, Arterial 95.5 94.0 - 98.0 %    POCT Hematocrit Calculated, Arterial 31.0 (L) 41.0 - 52.0 %    POCT Sodium, Arterial 128 (L) 136 - 145 mmol/L    POCT Potassium,  Arterial 3.6 3.5 - 5.3 mmol/L    POCT Chloride, Arterial 98 98 - 107 mmol/L    POCT Ionized Calcium, Arterial 1.09 (L) 1.10 - 1.33 mmol/L    POCT Glucose, Arterial 218 (H) 74 - 99 mg/dL    POCT Lactate, Arterial 1.9 0.4 - 2.0 mmol/L    POCT Base Excess, Arterial 0.0 -2.0 - 3.0 mmol/L    POCT HCO3 Calculated, Arterial 24.8 22.0 - 26.0 mmol/L    POCT Hemoglobin, Arterial 10.2 (L) 13.5 - 17.5 g/dL    POCT Anion Gap, Arterial 9 (L) 10 - 25 mmo/L    Patient Temperature 37.0 degrees Celsius    FiO2 40 %   CALCIUM, IONIZED   Result Value Ref Range    POCT Calcium, Ionized 1.09 (L) 1.1 - 1.33 mmol/L   CBC   Result Value Ref Range    WBC 2.6 (L) 4.4 - 11.3 x10*3/uL    nRBC 1.5 (H) 0.0 - 0.0 /100 WBCs    RBC 3.49 (L) 4.50 - 5.90 x10*6/uL    Hemoglobin 10.0 (L) 13.5 - 17.5 g/dL    Hematocrit 28.5 (L) 41.0 - 52.0 %    MCV 82 80 - 100 fL    MCH 28.7 26.0 - 34.0 pg    MCHC 35.1 32.0 - 36.0 g/dL    RDW 19.6 (H) 11.5 - 14.5 %    Platelets 60 (L) 150 - 450 x10*3/uL   Magnesium   Result Value Ref Range    Magnesium 2.29 1.60 - 2.40 mg/dL   Renal Function Panel   Result Value Ref Range    Glucose 208 (H) 74 - 99 mg/dL    Sodium 132 (L) 136 - 145 mmol/L    Potassium 3.7 3.5 - 5.3 mmol/L    Chloride 97 (L) 98 - 107 mmol/L    Bicarbonate 26 21 - 32 mmol/L    Anion Gap 13 10 - 20 mmol/L    Urea Nitrogen 51 (H) 6 - 23 mg/dL    Creatinine 1.79 (H) 0.50 - 1.30 mg/dL    eGFR 42 (L) >60 mL/min/1.73m*2    Calcium 7.6 (L) 8.6 - 10.6 mg/dL    Phosphorus 3.1 2.5 - 4.9 mg/dL    Albumin 2.5 (L) 3.4 - 5.0 g/dL   Blood Gas Arterial Full Panel   Result Value Ref Range    POCT pH, Arterial 7.35 (L) 7.38 - 7.42 pH    POCT pCO2, Arterial 43 (H) 38 - 42 mm Hg    POCT pO2, Arterial 108 (H) 85 - 95 mm Hg    POCT SO2, Arterial 100 94 - 100 %    POCT Oxy Hemoglobin, Arterial 96.3 94.0 - 98.0 %    POCT Hematocrit Calculated, Arterial 31.0 (L) 41.0 - 52.0 %    POCT Sodium, Arterial 130 (L) 136 - 145 mmol/L    POCT Potassium, Arterial 3.6 3.5 - 5.3 mmol/L    POCT  Chloride, Arterial 100 98 - 107 mmol/L    POCT Ionized Calcium, Arterial 1.11 1.10 - 1.33 mmol/L    POCT Glucose, Arterial 199 (H) 74 - 99 mg/dL    POCT Lactate, Arterial 1.8 0.4 - 2.0 mmol/L    POCT Base Excess, Arterial -1.9 -2.0 - 3.0 mmol/L    POCT HCO3 Calculated, Arterial 23.7 22.0 - 26.0 mmol/L    POCT Hemoglobin, Arterial 10.2 (L) 13.5 - 17.5 g/dL    POCT Anion Gap, Arterial 10 10 - 25 mmo/L    Patient Temperature 37.0 degrees Celsius    FiO2 40 %   POCT GLUCOSE   Result Value Ref Range    POCT Glucose 224 (H) 74 - 99 mg/dL   Blood Gas Arterial Full Panel   Result Value Ref Range    POCT pH, Arterial 7.41 7.38 - 7.42 pH    POCT pCO2, Arterial 38 38 - 42 mm Hg    POCT pO2, Arterial 92 85 - 95 mm Hg    POCT SO2, Arterial 99 94 - 100 %    POCT Oxy Hemoglobin, Arterial 95.5 94.0 - 98.0 %    POCT Hematocrit Calculated, Arterial 31.0 (L) 41.0 - 52.0 %    POCT Sodium, Arterial 130 (L) 136 - 145 mmol/L    POCT Potassium, Arterial 4.4 3.5 - 5.3 mmol/L    POCT Chloride, Arterial 100 98 - 107 mmol/L    POCT Ionized Calcium, Arterial 1.10 1.10 - 1.33 mmol/L    POCT Glucose, Arterial 195 (H) 74 - 99 mg/dL    POCT Lactate, Arterial 1.9 0.4 - 2.0 mmol/L    POCT Base Excess, Arterial -0.4 -2.0 - 3.0 mmol/L    POCT HCO3 Calculated, Arterial 24.1 22.0 - 26.0 mmol/L    POCT Hemoglobin, Arterial 10.4 (L) 13.5 - 17.5 g/dL    POCT Anion Gap, Arterial 10 10 - 25 mmo/L    Patient Temperature 37.0 degrees Celsius    FiO2 40 %   POCT GLUCOSE   Result Value Ref Range    POCT Glucose 176 (H) 74 - 99 mg/dL   Blood Gas Arterial Full Panel   Result Value Ref Range    POCT pH, Arterial 7.44 (H) 7.38 - 7.42 pH    POCT pCO2, Arterial 37 (L) 38 - 42 mm Hg    POCT pO2, Arterial 127 (H) 85 - 95 mm Hg    POCT SO2, Arterial 99 94 - 100 %    POCT Oxy Hemoglobin, Arterial 96.0 94.0 - 98.0 %    POCT Hematocrit Calculated, Arterial 30.0 (L) 41.0 - 52.0 %    POCT Sodium, Arterial 133 (L) 136 - 145 mmol/L    POCT Potassium, Arterial 3.6 3.5 - 5.3  mmol/L    POCT Chloride, Arterial 101 98 - 107 mmol/L    POCT Ionized Calcium, Arterial 1.17 1.10 - 1.33 mmol/L    POCT Glucose, Arterial 178 (H) 74 - 99 mg/dL    POCT Lactate, Arterial 1.5 0.4 - 2.0 mmol/L    POCT Base Excess, Arterial 1.0 -2.0 - 3.0 mmol/L    POCT HCO3 Calculated, Arterial 25.1 22.0 - 26.0 mmol/L    POCT Hemoglobin, Arterial 10.1 (L) 13.5 - 17.5 g/dL    POCT Anion Gap, Arterial 11 10 - 25 mmo/L    Patient Temperature 37.0 degrees Celsius    FiO2 0 %   CALCIUM, IONIZED   Result Value Ref Range    POCT Calcium, Ionized 1.09 (L) 1.1 - 1.33 mmol/L   CBC   Result Value Ref Range    WBC 3.0 (L) 4.4 - 11.3 x10*3/uL    nRBC 0.0 0.0 - 0.0 /100 WBCs    RBC 3.41 (L) 4.50 - 5.90 x10*6/uL    Hemoglobin 9.7 (L) 13.5 - 17.5 g/dL    Hematocrit 28.0 (L) 41.0 - 52.0 %    MCV 82 80 - 100 fL    MCH 28.4 26.0 - 34.0 pg    MCHC 34.6 32.0 - 36.0 g/dL    RDW 19.6 (H) 11.5 - 14.5 %    Platelets 58 (L) 150 - 450 x10*3/uL   Hepatic function panel   Result Value Ref Range    Albumin 2.4 (L) 3.4 - 5.0 g/dL    Bilirubin, Total 25.3 (H) 0.0 - 1.2 mg/dL    Bilirubin, Direct 18.0 (H) 0.0 - 0.3 mg/dL    Alkaline Phosphatase 300 (H) 33 - 136 U/L    ALT 40 10 - 52 U/L     (H) 9 - 39 U/L    Total Protein 4.5 (L) 6.4 - 8.2 g/dL   Magnesium   Result Value Ref Range    Magnesium 2.11 1.60 - 2.40 mg/dL   Basic Metabolic Panel   Result Value Ref Range    Glucose 186 (H) 74 - 99 mg/dL    Sodium 135 (L) 136 - 145 mmol/L    Potassium 3.6 3.5 - 5.3 mmol/L    Chloride 101 98 - 107 mmol/L    Bicarbonate 23 21 - 32 mmol/L    Anion Gap 15 10 - 20 mmol/L    Urea Nitrogen 39 (H) 6 - 23 mg/dL    Creatinine 1.36 (H) 0.50 - 1.30 mg/dL    eGFR 59 (L) >60 mL/min/1.73m*2    Calcium 7.8 (L) 8.6 - 10.6 mg/dL   Phosphorus   Result Value Ref Range    Phosphorus 1.7 (L) 2.5 - 4.9 mg/dL   POCT GLUCOSE   Result Value Ref Range    POCT Glucose 186 (H) 74 - 99 mg/dL   POCT GLUCOSE   Result Value Ref Range    POCT Glucose 154 (H) 74 - 99 mg/dL          Assessment/Plan   Principal Problem:    Soft tissue sarcoma (CMS/HCC)  Active Problems:    Acute kidney injury (nontraumatic) (CMS/HCC)    Pulmonary embolus (CMS/HCC)    Anemia    Thrombocytopenia (CMS/HCC)    Extraskeletal myxoid chondrosarcoma (CMS/HCC)    Cardiopulmonary arrest (CMS/HCC)    Acute respiratory failure with hypoxia (CMS/HCC)    Tracheostomy hemorrhage (CMS/HCC)    Postoperative wound breakdown, initial encounter    Jason Hollins is a 63 y.o. 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 Hawkins and Dr. Smith on 3/5/24.  Post operative course was uncomplicated and patient was on RNF until 3/20 for prolonged bed rest to avoid hip flexion to maintain flap integrity.  Patient was on prophylactic lovenox while on bedrest.      3/20: Cardiopulmonary arrest s/p CPR with ROSC after TPA, overnight started on heparin, epo, increased pressor requirements, increased swelling at flap site.      3/21: Hemorrhagic shock, MTP. Firm and large right flap site. Return to OR s/p Exploration of right thigh wound, Evacuation of hematoma. Suture ligation of multiple bleeding vessel and several points in gluteal area.      4/1: s/p Trach     4/4: return OR with ENT s/p laryngoscopy, esophagoscopy and bronchoscopy, trach revision. Found extensive clot burden in esophagus and stomach as well as in the lungs. Oozing at thyroid bed cauterized. Trach exchanged to new 6.0 prox XLT shiley. OR findings:  blood up glottis and from thyroid bed to airway.    4/8: Patient is medically stable for OR on 4/9/24 with plastics for debridement of necrotic tissue at surgical site. Will be NPO at mdnt. Active T&S and blood products on hold     Plan:  NEURO: History of myxoid chondrosarcoma s/p wide resection sarcoma, sciatic nerve neurolysis  of right lower extremity with right gluteal reconstruction, pedicle ALT, vastus lateralus flap, pedicle gluteal and perisformis flap with Dr. Hawkins and Dr. Smith on 3/5/24 s/p cardiopulmonary arrest with ROSC 3/20. CT head 3/22 without acute process. Weaned off cisatracurium and propofol overnight 3/23-3/24. Ketamine weaned off 3/25 and Fentanyl weaned off 3/26 am. Repeat CT Head 3/26 without acute process. MRI Brain 3/30, negative for cerebral infarction or hemorrhage. Neuro exam - off sedation, following commands except for RLE, tracking, nodding/shaking head to questions  - ongoing neuro and pain assessments  - keep off sedation  - PT/OT -> AROM  - no need for soft wrist restraints at this time -> continue to reassess     CV: History of HTN, HLD. Acute cardiopulmonary arrest 2/2 to possible massive PE vs massive STEMI, received multiple rounds of CPR and one defibrillation.  Sustained ROSC achieved after TPA bolus. On high dose levophed, epinephrine, vaso, angioT2. Plan on 3/21 was for ECMO which was aborted secondary to large hemhorrge at right flap site. Had MTP and taken OR with 5L evacuated. ECHO 3/21: Normal LV, Mod enlarged RV, slight suggestion of a Townsend's sign / RV strain, low normal RV function. ECHO 3/22 with hyperdynamic LV. New onset Afib with RVR overnight 3/22-3/23, dosed with 150mg amio x2, started infusion at 1mg/min thereafter. AT2 weaned off. Amio infusion discontinued 3/25. Lactate downtrending. Vaso and epi weaned off. Remains in NSR. iEpo weaned off 3/27. Repeat TTE 3/29 and 4/2 essentially unchanged. Levo resumed 4/2 evening to continue aggressive fluid removal. Off of pressors since 4/5 evening.  - continuous EKG/abp/cvp monitoring  - Goal map range 65-90  - Continue Midodrine to 15mg TID  - Continue florinef 0.1mg BID   - Decreased SDS to hydrocortisone 25mg once today for last dose   - Holding heparin infusion for now      PULM: Arrived to SICU intubated julio c-CPR. Concern for  massive PE. Started on iEpo, currently on 0.05. Hypoxic respiratory failure. Severe ARDS. ETT exchanged 3/23. CT Chest 3/26 with interval JOHN consolidative/ground glass opacity. S/p Tracheostomy on 4/1. Bedside bronch -> some blood in trach, posterior wall tissue just distal to trach tip appears to be collapsing on cannula. Currently on SIMV 40%, 500, 16 +10, 10 PS. Vt 400-500cc.   -  RT to try pressure support ventilation today for few hours at PS 10 and PEEP 15, FiO2 40%  - ABGs prn  - additional pulm toilet prn -will discuss regimen with RT  - daily CXR -chest x-ray today indicates similar bilateral hazy opacities, consistent with prior day     ENT: Had epistaxis 3/23, completed afrin bid x3 days. S/p trach on 4/1. Bleeding from trach site 4/2 am, packing placed by ENT. Repeat episode of bloody tracheal secretions 4/3 through this am. Taken back to OR with ENT 4/4 as per above.  - TXA as above and holding heparin for now     GI: NPO. Shock liver, pancreatitis. Elevated TG. Elevated lactate. Consulted ACS for potential bowel ischemia as cause of persistent lactic acidosis. CT imaging 3/22 with evidence of pancreatitis. No acute surgical indication per ACS. RUQ US 3/22 with thickening of GB wall without cholelithiasis. CT A/P 3/26 negative for acute bleed. LFTs downtrending. Worsening hyperbilirubinemia. Amylase and lipase now WNL. Repeat RUQ US 4/1 with nonspecific gallbladder wall edema and thickening which may be 2/2 generalized fluid overload, mild hepatomegaly with slight nodular contour and c/f steatosis and fibrosis, irregular hypoechoic region adjacent to hepatic veins. US liver with doppler 4/2 revealed hepatomegaly with nodular contour, mildly elevated RI in main and left hepatic arteries. Hyperbilirubinemia  -Continue TF - abdomen is soft, nontender, nondistended today - will be NPO at midnight for OR tomorrow  - prostat daily  - PPI daily for GI prophy  - Trend LFTs daily -AST/ALT/total bilirubin is  approximately similar to prior days.  Per discussion with hepatology yesterday, minor fluctuations are to be expected prior to eventual downtrend     : No history of renal disease. Baseline creatinine 0.6. Anuric RUTH. Elevated CK, downtrending. Metabolic acidosis, total body fluid overload, hyperkalemia. Started on CVVH 3/21, stopped bicarb infusion 3/22. Marino removed 3/24. Hyponatremia resolved. Stopped CVVH 4/2. Tolerated 2L fluid removal with SLED overnight.  - Nephrology following -will follow up on plans  - RFP BID and PRN  - Replete electrolytes judiciously - RFP to be drawn 4 hours after SLED for repletion of electrolytes  - Bladder scan pending this AM      HEME: Patient was on ppx lovenox for DVT prophylaxis prior to cardiopulmonary arrest. Concern for massive PE patient received bolus of TPA during CPR. Started on heparin infusion overnight given concern for PE. Hemorrhagic shock 3/21, MTP and back to OR s/p Exploration of right thigh woundEvacuation of hematoma. Suture ligation of multiple bleeding vessel and several points in gluteal area. Hematology consulted 3/22 to r/o HLH. Transfused 1 RBC overnight 3/22-3/23. Thrombocytopenia, coagulapathy, hypofibrinogenemia. LE Duplex 3/25 +acute occlusive R soleal DVT. Received 2u PRBC and 1u FFP on 3/26. Heparin infusion discontinued 3/26 to r/o HIT and transitioned to bival. PF4 negative, resumed heparin infuision 3/27. Elevated IL2R and decreased NK function. C/f HLH (low suspicion), empirically treated with decadron (3/28-3/31). Thrombocytopenia on 3/30 to 18k, received 5pk, did not increment. Heparin held. Thrombocytopenia likely 2/2 to consumptive process, hematology following. Received IVIG and 5pk plts 3/31. Pancytopenia persists, improving thrombocytopenia  - cbc, coags bid and prn  -Hemoglobin stable at 9.7 (prior 10.1)  - Continue subcutaneous heparin for DVT prophylaxis today - will hold DVT prophy tomorrow AM for OR  - Will renew T&S, hold pRBC,  FFP, Plt for anticipation of OR on 4/9  - Hematology following, appreciate recs -> maintain Plt count >35k  - ongoing monitoring for s/s bleeding  - close surveillance of RLE and drains  - Vasc Med signed off 3/27  - maintain active T&S (4/6)     ENDO: History of DM2. A1c 7.5%. TSH WNL 1/2024. Hyperglycemia  -Lantus increased to 16 units twice daily last night.  Patient continues to require significant sliding scale insulin (total 32 units Lantus and 36 units lispro for past 24 hours). Will continue Lantus 16u this AM and use SSI given we are de-escalating steroids and patient will be NPO 4/8 night for OR 4/9. Plan for Lantus 8u tonight given NPO status. Will monitor blood glucose today   - q4h accuchecks and SSI #4     ID: Leukocytosis resolved, now with leukopenia. On broad antimicrobial therapy. MRSA screen + 2/28/24. Pan cultures sent 3/22. Sputum NTF, +MRSA screen (completed 5 day course mupirocin), UCx and BCx negative. Completed 7 day course vanc/zosyn 3/27. Febrile to 39.1 4/3, resent blood cultures and BAL and started vanco and zosyn. Switched zosyn to reynaldo, kept on vanco, added john. Blood cultures and sputum with Klebsiella. Repeat blood cultures sent 4/4.  - F/U cultures  - temp q4h, wbc daily  - Per ID - Meropenem dose changed to 1g q8hr with end date 4/14/24  - ongoing monitoring for s/s infection  - plastics team noted some darkened discoloration of the native tissue at the R posterior hip incision that is c/f necrosis that the surgeon would like to ideally debride and close on 4/9 if patient is medically stable     Lines:  - right radial a line (4/5)  - RIJ trialysis (3/27)  - PIV x2     Family: Discussed plans with niece at bedside    Patient seen and discussed with Dr. Cam Dailey, PGY-1  SICU Team

## 2024-04-08 NOTE — ANESTHESIA PREPROCEDURE EVALUATION
Patient: Jason Hollins    Procedure Information       Date/Time: 04/09/24 1115    Procedures:       Incision and Drainage Thigh (Right)      Closure Surgical Wound Lower Extremity (Right)      Wound Vac Application / Change (Right)    Location: [facility] OR 12 / Virtual [facility] OR    Surgeons: Angy Smith MD     Jason Hollins is a 63 y.o. 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 Hawkins and Dr. Smith on 3/5/24.  Post operative course was uncomplicated and patient was on RNF until 3/20 for prolonged bed rest to avoid hip flexion to maintain flap integrity.  Patient was on prophylactic lovenox while on bedrest.      3/20: Cardiopulmonary arrest s/p CPR with ROSC after TPA, overnight started on heparin, epo, increased pressor requirements, increased swelling at flap site.      3/21: Hemorrhagic shock, MTP. Firm and large right flap site. Return to OR s/p Exploration of right thigh wound, Evacuation of hematoma. Suture ligation of multiple bleeding vessel and several points in gluteal area.      4/1: s/p Trach     4/4: return OR with ENT s/p laryngoscopy, esophagoscopy and bronchoscopy, trach revision. Found extensive clot burden in esophagus and stomach as well as in the lungs. Oozing at thyroid bed cauterized. Trach exchanged to new 6.0 prox XLT chrisley. OR findings:  blood up glottis and from thyroid bed to airway.     4/8: Patient is medically stable for OR on 4/9/24 with plastics for debridement of necrotic tissue at surgical site. Will be NPO at [facility]. Active T&S and blood products on hold    ALLERGIES:  No Known Allergies     MEDICAL HISTORY:  Past Medical History:   Diagnosis Date   • Diabetes mellitus (CMS/[facility])    • Hyperlipidemia    • Morbid (severe) obesity due to excess calories (CMS/[facility]) 05/31/2016    Severe  obesity (BMI 35.0-39.9) with comorbidity        Relevant Problems   Cardiac   (+) Cardiopulmonary arrest (CMS/HCC)   (+) Hyperlipidemia      Pulmonary   (+) Pulmonary embolus (CMS/HCC)      /Renal   (+) Acute kidney injury (nontraumatic) (CMS/HCC)      Endocrine   (+) Class 2 obesity with body mass index (BMI) of 38.0 to 38.9 in adult   (+) DM type 2 without retinopathy (CMS/HCC)   (+) Diabetes mellitus type 2 in obese   (+) Severe obesity (BMI 35.0-39.9) with comorbidity (CMS/HCC)   (+) Type 2 diabetes mellitus with hyperglycemia (CMS/HCC)      Hematology   (+) Anemia   (+) Thrombocytopenia (CMS/HCC)      ID   (+) Candidal intertrigo   (+) Viral illness        SURGICAL HISTORY:  Past Surgical History:   Procedure Laterality Date   • CT GUIDED PERCUTANEOUS BIOPSY MUSCLE  11/13/2023    CT GUIDED PERCUTANEOUS BIOPSY MUSCLE 11/13/2023 [facility] CT   • INVASIVE VASCULAR PROCEDURE N/A 3/20/2024    Procedure: Pulmonary Angiogram;  Surgeon: Axel Wolfe MD;  Location: [place] [address] Cardiac Cath Lab;  Service: Cardiovascular;  Laterality: N/A;   • OTHER SURGICAL HISTORY  05/31/2016    History Of Prior Surgery        VITALS:      4/9/2024     9:00 AM 4/9/2024     8:00 AM 4/9/2024     7:00 AM   Vitals   Heart Rate 101 87 86   Resp 34 26 22       LABS:   BMP   Lab Results   Component Value Date    GLUCOSE 161 (H) 04/08/2024    CALCIUM 8.0 (L) 04/08/2024     04/08/2024    K 3.9 04/08/2024    CO2 25 04/08/2024     04/08/2024    BUN 45 (H) 04/08/2024    CREATININE 1.63 (H) 04/08/2024   , LFT   Lab Results   Component Value Date    ALT 27 04/08/2024     (H) 04/08/2024    GGT 21 02/13/2024    ALKPHOS 312 (H) 04/08/2024    BILITOT 27.4 (H) 04/08/2024   , CBC  Lab Results   Component Value Date    WBC 4.7 04/08/2024    HGB 9.6 (L) 04/08/2024    HCT 25.5 (L) 04/08/2024    MCV 76 (L) 04/08/2024    PLT 65 (L) 04/08/2024          , Coags   Lab Results   Component Value Date/Time    PROTIME 14.7 (H) 04/08/2024 1018    INR 1.3 (H)  04/08/2024 1018    APTT 31 04/08/2024 1018      , A1C   Lab Results   Component Value Date    HGBA1C 7.5 (H) 02/28/2024       IMAGES:  EKG          Encounter Date: 03/05/24   Electrocardiogram, 12-lead PRN ACS symptoms   Result Value    Ventricular Rate 150    Atrial Rate 144    QRS Duration 108    QT Interval 310    QTC Calculation(Bazett) 489    R Axis 8    T Axis -12    QRS Count 25    Q Onset 220    T Offset 375    QTC Fredericia 420    Narrative    Atrial fibrillation with rapid ventricular response  Low voltage QRS  Nonspecific ST abnormality  Abnormal ECG  When compared with ECG of 20-MAR-2024 18:41,  Atrial fibrillation has replaced Sinus rhythm  Right bundle branch block is no longer Present  Criteria for Anterior infarct are no longer Present  Criteria for Anterolateral infarct are no longer Present  Criteria for Inferior infarct are no longer Present  Confirmed by Damien Choe ([id number]) on 3/27/2024 1:49:03 PM      , ECHO         Transthoracic Echo (TTE) Limited With Contrast 04/02/2024    [facility], [address] [place] [address]  Tel [phone] and Fax [phone]    TRANSTHORACIC ECHOCARDIOGRAM REPORT      Patient Name:      YVETTE Clinton Physician:    [id number] Jennifer Gomez MD  Study Date:        4/2/2024             Ordering Provider:    [id number] JARRED ADAMS  MRN/PID:           [id number]             Fellow:               [id number] Alessandro Wise MD  Accession#:        [id number]         Nurse:  Date of Birth/Age: 1960 / 63 years Sonographer:          CHIDI Hong RDCS  Gender:            M                    Additional Staff:  Height:            177.80 cm            Admit Date:           2/28/2024  Weight:            124.74 kg            Admission Status:     Inpatient -  Routine  BSA / BMI:         2.39 m2 / 39.46      Encounter#:           [id number]  kg/m2  Department Location:  [facility]  Blood Pressure: 134 /55 mmHg    Study Type:     TRANSTHORACIC ECHO (TTE) LIMITED  Diagnosis/ICD: Shock, unspecified-R57.9  Indication:    Shock  CPT Code:      Echo Limited-59606    Patient History:  Pertinent History: HLD, anemia, sarcoma, DM, obesity, s/p cardiac arrest 2/28/24  w/ RUTH, shock and resp failure.    Study Detail: The following Echo studies were performed: 2D and M-Mode.  Technically challenging study due to patient lying in supine  position, a tracheostomy present and poor acoustic windows. The  patient is intubated. Definity used as a contrast agent for  endocardial border definition. Total contrast used for this  procedure was 1 mL via IV push. The patient was awake.      PHYSICIAN INTERPRETATION:  Left Ventricle: The left ventricular systolic function is normal, with an estimated ejection fraction of 65-70%. There are no regional wall motion abnormalities. The left ventricular cavity size is normal. The interventricular septum is flattened in diastole ('D' shaped left ventricle), consistent with right ventricular volume overload. Left ventricular diastolic filling was not assessed.  Left Atrium: The left atrium is normal in size.  Right Ventricle: The right ventricle was not well visualized. There is reduced right ventricular systolic function. RV not well seen: in some views possibly dilated with mildly reduced function, but not quantified.  Right Atrium: The right atrium is normal in size.  Aortic Valve: The aortic valve was not well visualized. Aortic valve regurgitation was not assessed.  Mitral Valve: The mitral valve is normal in structure. Mitral valve regurgitation was not assessed.  Tricuspid Valve: The tricuspid valve is structurally normal. Tricuspid regurgitation was not assessed.  Pulmonic Valve: The pulmonic valve is not well visualized. Pulmonic valve regurgitation was not assessed.  Pericardium: There is a trivial pericardial effusion.  Aorta: The aortic root is abnormal. There is mild dilatation of the ascending aorta. There  is mild dilatation of the aortic root.  Systemic Veins: The inferior vena cava appears dilated. Mechanically ventilated.  In comparison to the previous echocardiogram(s): Compared with study from 3/29/2024,. Compared with the prior exam from 3/29/2024, both studies were technically difficult and limited. Overall no significant changes.      CONCLUSIONS:  1. Left ventricular systolic function is normal with a 65-70% estimated ejection fraction.  2. Poorly visualized anatomical structures due to suboptimal image quality.  3. Right ventricular volume overload.  4. RV not well seen: in some views possibly dilated with mildly reduced function, but not quantified.  5. There is reduced right ventricular systolic function.  6. Compared with the prior exam from 3/29/2024, both studies were technically difficult and limited. Overall no significant changes.    QUANTITATIVE DATA SUMMARY:  2D MEASUREMENTS:  Normal Ranges:  Ao Root d:     3.50 cm   (2.0-3.7cm)  LAs:           5.10 cm   (2.7-4.0cm)  RVIDd:         4.40 cm   (0.9-3.6cm)  IVSd:          0.90 cm   (0.6-1.1cm)  LVPWd:         0.90 cm   (0.6-1.1cm)  LVIDd:         5.20 cm   (3.9-5.9cm)  LVIDs:         3.50 cm  LV Mass Index: 70.7 g/m2  LV % FS        32.7 %    LA VOLUME:  Normal Ranges:  LA Vol A4C:        72.5 ml    (22+/-6mL/m2)  LA Vol A2C:        46.9 ml  LA Vol BP:         67.0 ml  LA Vol Index A4C:  30.3ml/m2  LA Vol Index A2C:  19.6 ml/m2  LA Vol Index BP:   28.0 ml/m2  LA Area A4C:       23.0 cm2  LA Area A2C:       16.1 cm2  LA Major Axis A4C: 6.2 cm  LA Major Axis A2C: 4.7 cm    AORTA MEASUREMENTS:  Normal Ranges:  Ao Sinus, d: 3.50 cm (2.1-3.5cm)  Asc Ao, d:   3.50 cm (2.1-3.4cm)    LV SYSTOLIC FUNCTION BY 2D PLANIMETRY (MOD):  Normal Ranges:  EF-A4C View: 65.5 % (>=55%)  EF-A2C View: 72.1 %  EF-Biplane:  69.2 %    AORTIC VALVE:  Normal Ranges:  LVOT Diameter: 2.30 cm (1.8-2.4cm)      [id number] Jennifer Gomez MD  Electronically signed on 4/2/2024 at 10:29:38  AM        ** Final **    , CARDIAC CATH      @[facility]@, CXR       XR chest 1 view 04/09/2024    Narrative  Interpreted By:  Nadege Parrish  and Jeanine Brambila  STUDY:  XR CHEST 1 VIEW;  4/9/2024 3:38 am    INDICATION:  Signs/Symptoms:preop.    COMPARISON:  Radiograph 04/08/2024    ACCESSION NUMBER(S):  [id number]    ORDERING CLINICIAN:  JONATHAN BLISS    FINDINGS:  AP radiograph of the chest was provided.    Tracheostomy cannula is seen.  Dobhoff tube is visualized coursing beneath the diaphragm with the  tip beyond the field of view. Right IJ central venous catheter with  tip overlying the mid SVC.    CARDIOMEDIASTINAL SILHOUETTE:  Cardiomediastinal silhouette is stable in size and configuration.    LUNGS:  Redemonstration of hazy opacification throughout the lung bases  mildly improved compared to prior examination. Small left pleural  effusion. Probable layering right pleural effusion. No pneumothorax.    ABDOMEN:  No remarkable upper abdominal findings.    BONES:  No acute osseous changes.    Impression  1.  Mild interval improvement of pulmonary edema with small  left-sided pleural effusion. Probable layering right pleural effusion.  2. Medical devices/lines as noted above.    I personally reviewed the images/study and I agree with the findings  as stated by Kosta Solorzano MD. This study was interpreted at  [facility], [place], [place].    MACRO:  None.    Signed by: Nadege Parrish 4/9/2024 8:58 AM  Dictation workstation:   [id number]    , CT Head/Neck    @[email or web address]@, CT Chest        CT angio chest abdomen pelvis 04/04/2024    Narrative  Interpreted By:  Azar Farooq,  and Xochitl Koroma  STUDY:  CT ANGIO CHEST ABDOMEN PELVIS;  4/4/2024 12:10 am    INDICATION:  Signs/Symptoms:r/o bleeding.    COMPARISON:  None.    ACCESSION NUMBER(S):  [id number]    ORDERING CLINICIAN:  SHARLA DUVAL    TECHNIQUE:   images of the chest, abdomen and pelvis were  performed. No CT  images were performed.    FINDINGS:  Only  images of the chest, abdomen and pelvis were performed.    Impression  Nondiagnostic study.    I personally reviewed the images/study and I agree with the findings  as stated by resident physician Dr. Ga Noel . This study  was interpreted at [facility],  [place], [place].    MACRO:  None    Signed by: Azar Farooq 4/5/2024 5:32 AM  Dictation workstation:   [id number]   , CT Abdomin     No results found for this or any previous visit from the past 730 days.    SOCIAL:  Social History     Tobacco Use   Smoking Status Never   Smokeless Tobacco Never      Social History     Substance and Sexual Activity   Alcohol Use Never      Social History     Substance and Sexual Activity   Drug Use Never        NPO STATUS:  No data recorded    Clinical Areas Reviewed:   Tobacco  Allergies  Meds  Problems  Med Hx  Surg Hx   Fam Hx  Soc   Hx      PHYSICAL EXAM    Anesthesia Plan

## 2024-04-08 NOTE — PROGRESS NOTES
Physical Therapy    Physical Therapy Re-Evaluation & Treatment    Patient Name: Jason Hollins  MRN: [id number]  Today's Date: 4/8/2024   Time Calculation  Start Time: 1226  Stop Time: 1418  Time Calculation (min): 112 min    Assessment/Plan   PT Assessment  PT Assessment Results: Decreased strength, Decreased endurance, Impaired balance, Decreased range of motion, Decreased mobility  Rehab Prognosis: Fair  Medical Staff Made Aware: Yes  End of Session Communication: Bedside nurse  Assessment Comment: Assisted pt with bed mobility and EOB sitting.  Limited time at EOB d/t elevated BP with stimulation.  MaxAx2-3 for all mobility this date.  End of Session Patient Position: Bed, 4 rail up, Alarm off, not on at start of session   IP OR SWING BED PT PLAN  Inpatient or Swing Bed: Inpatient  PT Plan  Treatment/Interventions: Bed mobility, Transfer training, Balance training, Neuromuscular re-education, Strengthening, Endurance training, Therapeutic exercise, Therapeutic activity  PT Plan: Skilled PT  PT Frequency: 3 times per week  PT Discharge Recommendations: Moderate intensity level of continued care  Equipment Recommended upon Discharge:  (none)  PT Recommended Transfer Status: Assist x2  PT - OK to Discharge: Yes      Subjective     General Visit Information:  General  Reason for Referral: PT re-eval: 3/20: Cardiopulmonary arrest s/p CPR with ROSC after TPA, overnight started on heparin, epo, increased pressor requirements, increased swelling at flap site.  3/21: Hemorrhagic shock, MTP. Firm and large right flap site. Return to OR s/p Exploration of right thigh wound, Evacuation of hematoma. Suture ligation of multiple bleeding vessel and several points in gluteal area. 4/1: s/p Trach  Past Medical History Relevant to Rehab: DM II, HLD, HTN, right gluteal extraskeletal myxoid chondrosarcoma  Family/Caregiver Present: Yes  Caregiver Feedback: Initially neice present at bedside however waited for complete session until wife  present at bedside.  Returned to room at a later time for rest of session.  Co-Treatment: OT  Co-Treatment Reason: To maximize pt safety with mobility  Prior to Session Communication: Bedside nurse  Patient Position Received: Bed, 4 rail up, Alarm off, not on at start of session  Preferred Learning Style: auditory, verbal  General Comment: Pt awake, alert and willing to participate in PT session.  Pt able to nod head yes/no and give thumbs up to answers.  Home Living:  Home Living  Type of Home: House  Lives With: Spouse  Home Adaptive Equipment: Cane  Home Layout: Two level, Stairs to alternate level with rails  Alternate Level Stairs-Rails: Left  Alternate Level Stairs-Number of Steps: 13  Home Access: Stairs to enter without rails  Entrance Stairs-Rails: None  Entrance Stairs-Number of Steps: 2  Bathroom Shower/Tub: Tub/shower unit, Walk-in shower  Bathroom Toilet: Standard  Bathroom Equipment: None  Home Living Comments: Home set up obtain from previous eval  Prior Level of Function:  Prior Function Per Pt/Caregiver Report  Level of Hawkins: Independent with ADLs and functional transfers, Independent with homemaking with ambulation  Receives Help From: Family  ADL Assistance: Independent  Homemaking Assistance: Independent  Ambulatory Assistance: Independent  Vocational: Full time employment  Prior Function Comments: PLOF obtained from previous eval  Precautions:  Precautions  Hearing/Visual Limitations: Hearing appeared WFL  LE Weight Bearing Status:  (no right hip flexion, right knee flexion ok, right hip ABD/ADD ok, NWB RLE)  Medical Precautions: Fall precautions  Precautions Comment: MAP 65-90  Vital Signs:  Vital Signs  Heart Rate: 87 (Post: 89)  Heart Rate Source: Monitor  Resp: (!) 30 (Post: 31)  SpO2: 99 % (Post: 98)  BP: (!) 136/45 (Post; 135/60)  MAP (mmHg): 68 (Post; 72)  BP Location: Left arm  BP Method: Arterial line  Patient Position: Lying    Objective   Pain:  Pain Assessment  Pain  Assessment: 0-10  Pain Score: 0 - No pain  Cognition:  Cognition  Overall Cognitive Status:  (AOx3 with choices, unable to verbally respond d/t trach>vent.)  Arousal/Alertness: Delayed responses to stimuli  Following Commands: Follows one step commands with increased time    General Assessments:  General Observation  General Observation: Trach>vetn: CPAP 40%, 15 PEEP, 10 Psupp, central line, x3 ANDERSON, , IV     Activity Tolerance  Endurance: Tolerates less than 10 min exercise, no significant change in vital signs  Early Mobility/Exercise Safety Screen: Proceed with mobilization - No exclusion criteria met  Activity Tolerance Comments: Increased RR with activity - 45    Sensation  Light Touch: No apparent deficits    Strength  Strength Comments: Decreased strength d/t immobility and increase bedrest  Postural Control  Postural Control: Impaired    Static Sitting Balance  Static Sitting-Balance Support: Bilateral upper extremity supported, Feet supported  Static Sitting-Level of Assistance: Dependent (x2)  Static Sitting-Comment/Number of Minutes: ~5 min limited time in sitting d/t elevated BP (SBP 190s)    Functional Assessments:  ADL  ADL's Addressed:  (Refer to OTs note for additional information)    Bed Mobility  Bed Mobility: Yes  Bed Mobility 1  Bed Mobility 1: Supine to sitting, Sitting to supine  Level of Assistance 1: Dependent (x3)  Bed Mobility Comments 1: HOB elevated, draw sheet utilized  Bed Mobility 2  Bed Mobility  2:  (Boost toward HOB)  Level of Assistance 2: Dependent (x3)  Bed Mobility Comments 2: HOB flat, draw sheet utilized    Transfers  Transfer: No    Ambulation/Gait Training  Ambulation/Gait Training Performed: No    Extremity/Trunk Assessments:  RLE   RLE :  (Limited mobility d/t weakness, full PROM)  LLE   LLE :  (Limited mobility d/t weakness, full PROM)  Treatments:  Therapeutic Activity  Therapeutic Activity Performed: Yes  Therapeutic Activity 1: Increased time for skilled ICU line/tube  management for safe functional mobility.  Therapeutic Activity 2: Assisted RN with rolling L/R and BM clean up.  DepA x2-3 to complete rolling and to hold pt in side lying for clean up.  Stable vitals throughout.  Increased time to complete.  Therapeutic Activity 3: Pt sat EOB ~5 min with depAx2 d/t weakness however limited time in sitting d/t elevated BP.  Outcome Measures:  [facility] Basic Mobility  Turning from your back to your side while in a flat bed without using bedrails: Total  Moving from lying on your back to sitting on the side of a flat bed without using bedrails: Total  Moving to and from bed to chair (including a wheelchair): Total  Standing up from a chair using your arms (e.g. wheelchair or bedside chair): Total  To walk in hospital room: Total  Climbing 3-5 steps with railing: Total  Basic Mobility - Total Score: 6    FSS-ICU  Ambulation: Unable to attempt due to weakness  Rolling: Total assistance (performs 25% or requires another person)  Sitting: Total assistance (performs 25% or requires another person)  Transfer Sit-to-Stand: Unable to perform  Transfer Supine-to-Sit: Total assistance (performs 25% or requires another person)  Total Score: 3    Encounter Problems       Encounter Problems (Active)       Balance       Pt will demonstrate ability to complete sitting static/dynamic balance activities with, maxAx1, bilateral UE support and no LOB for increase in safety up D/C.  (Progressing)       Start:  04/08/24    Expected End:  04/22/24               Mobility       Pt will demonstrate ability to complete ther ex activities to improve functional strength for increase in independence upon DC.  (Progressing)       Start:  04/08/24    Expected End:  04/22/24            Pt will be able to tolerate >15 minutes of seated activity continuously without seated rest break with stable vitals and RPD </=3/10 and RPE </=13/20 for improved functional mobility  (Progressing)       Start:  04/08/24    Expected End:   04/22/24               PT Transfers       Pt will demonstrated ability to complete bed mobility and sit<>stand transfers with max assistance x2 and use of assistive device to safely. (Progressing)       Start:  04/08/24    Expected End:  04/22/24                   Education Documentation  Precautions, taught by Beatriz Caputo PT at 4/8/2024  3:58 PM.  Learner: Significant Other, Family, Patient  Readiness: Acceptance  Method: Explanation  Response: Needs Reinforcement    Body Mechanics, taught by Beatriz Caputo PT at 4/8/2024  3:58 PM.  Learner: Significant Other, Family, Patient  Readiness: Acceptance  Method: Explanation  Response: Needs Reinforcement    Mobility Training, taught by Beatriz Caputo PT at 4/8/2024  3:58 PM.  Learner: Significant Other, Family, Patient  Readiness: Acceptance  Method: Explanation  Response: Needs Reinforcement    Education Comments  No comments found.

## 2024-04-09 ENCOUNTER — APPOINTMENT (OUTPATIENT)
Dept: RADIOLOGY | Facility: HOSPITAL | Age: 64
DRG: 003 | End: 2024-04-09
Payer: COMMERCIAL

## 2024-04-09 ENCOUNTER — ANESTHESIA EVENT (OUTPATIENT)
Dept: OPERATING ROOM | Facility: HOSPITAL | Age: 64
DRG: 003 | End: 2024-04-09
Payer: COMMERCIAL

## 2024-04-09 ENCOUNTER — ANESTHESIA (OUTPATIENT)
Dept: OPERATING ROOM | Facility: HOSPITAL | Age: 64
DRG: 003 | End: 2024-04-09
Payer: COMMERCIAL

## 2024-04-09 PROBLEM — T81.31XS POSTOPERATIVE WOUND BREAKDOWN, SEQUELA: Status: ACTIVE | Noted: 2024-02-28

## 2024-04-09 LAB
ALBUMIN SERPL BCP-MCNC: 2.4 G/DL (ref 3.4–5)
ALBUMIN SERPL BCP-MCNC: 2.4 G/DL (ref 3.4–5)
ALP SERPL-CCNC: 312 U/L (ref 33–136)
ALT SERPL W P-5'-P-CCNC: 27 U/L (ref 10–52)
ANION GAP BLDA CALCULATED.4IONS-SCNC: 10 MMO/L (ref 10–25)
ANION GAP BLDA CALCULATED.4IONS-SCNC: 12 MMO/L (ref 10–25)
ANION GAP SERPL CALC-SCNC: 14 MMOL/L (ref 10–20)
ANION GAP SERPL CALC-SCNC: 14 MMOL/L (ref 10–20)
AST SERPL W P-5'-P-CCNC: 138 U/L (ref 9–39)
BASE EXCESS BLDA CALC-SCNC: -0.1 MMOL/L (ref -2–3)
BASE EXCESS BLDA CALC-SCNC: -1.9 MMOL/L (ref -2–3)
BILIRUB DIRECT SERPL-MCNC: 19.5 MG/DL (ref 0–0.3)
BILIRUB SERPL-MCNC: 27.4 MG/DL (ref 0–1.2)
BODY TEMPERATURE: 37 DEGREES CELSIUS
BODY TEMPERATURE: 37 DEGREES CELSIUS
BUN SERPL-MCNC: 45 MG/DL (ref 6–23)
BUN SERPL-MCNC: 52 MG/DL (ref 6–23)
C DIF TOX TCDA+TCDB STL QL NAA+PROBE: NOT DETECTED
CA-I BLD-SCNC: 1.1 MMOL/L (ref 1.1–1.33)
CA-I BLD-SCNC: 1.11 MMOL/L (ref 1.1–1.33)
CA-I BLDA-SCNC: 1.12 MMOL/L (ref 1.1–1.33)
CA-I BLDA-SCNC: 1.13 MMOL/L (ref 1.1–1.33)
CALCIUM SERPL-MCNC: 8 MG/DL (ref 8.6–10.6)
CALCIUM SERPL-MCNC: 8.1 MG/DL (ref 8.6–10.6)
CHLORIDE BLDA-SCNC: 102 MMOL/L (ref 98–107)
CHLORIDE BLDA-SCNC: 102 MMOL/L (ref 98–107)
CHLORIDE SERPL-SCNC: 100 MMOL/L (ref 98–107)
CHLORIDE SERPL-SCNC: 101 MMOL/L (ref 98–107)
CO2 SERPL-SCNC: 25 MMOL/L (ref 21–32)
CO2 SERPL-SCNC: 25 MMOL/L (ref 21–32)
CREAT SERPL-MCNC: 1.63 MG/DL (ref 0.5–1.3)
CREAT SERPL-MCNC: 2 MG/DL (ref 0.5–1.3)
EGFRCR SERPLBLD CKD-EPI 2021: 37 ML/MIN/1.73M*2
EGFRCR SERPLBLD CKD-EPI 2021: 47 ML/MIN/1.73M*2
ERYTHROCYTE [DISTWIDTH] IN BLOOD BY AUTOMATED COUNT: 18.9 % (ref 11.5–14.5)
ERYTHROCYTE [DISTWIDTH] IN BLOOD BY AUTOMATED COUNT: 19.3 % (ref 11.5–14.5)
ERYTHROCYTE [DISTWIDTH] IN BLOOD BY AUTOMATED COUNT: 20.2 % (ref 11.5–14.5)
GLUCOSE BLD MANUAL STRIP-MCNC: 114 MG/DL (ref 74–99)
GLUCOSE BLD MANUAL STRIP-MCNC: 151 MG/DL (ref 74–99)
GLUCOSE BLD MANUAL STRIP-MCNC: 173 MG/DL (ref 74–99)
GLUCOSE BLD MANUAL STRIP-MCNC: 90 MG/DL (ref 74–99)
GLUCOSE BLD MANUAL STRIP-MCNC: 98 MG/DL (ref 74–99)
GLUCOSE BLDA-MCNC: 153 MG/DL (ref 74–99)
GLUCOSE BLDA-MCNC: 157 MG/DL (ref 74–99)
GLUCOSE SERPL-MCNC: 112 MG/DL (ref 74–99)
GLUCOSE SERPL-MCNC: 161 MG/DL (ref 74–99)
HCO3 BLDA-SCNC: 22.1 MMOL/L (ref 22–26)
HCO3 BLDA-SCNC: 23.8 MMOL/L (ref 22–26)
HCT VFR BLD AUTO: 25.5 % (ref 41–52)
HCT VFR BLD AUTO: 27.9 % (ref 41–52)
HCT VFR BLD AUTO: 28.1 % (ref 41–52)
HCT VFR BLD EST: 30 % (ref 41–52)
HCT VFR BLD EST: 31 % (ref 41–52)
HGB BLD-MCNC: 10.1 G/DL (ref 13.5–17.5)
HGB BLD-MCNC: 9.6 G/DL (ref 13.5–17.5)
HGB BLD-MCNC: 9.9 G/DL (ref 13.5–17.5)
HGB BLDA-MCNC: 10.2 G/DL (ref 13.5–17.5)
HGB BLDA-MCNC: 9.9 G/DL (ref 13.5–17.5)
INHALED O2 CONCENTRATION: 40 %
LACTATE BLDA-SCNC: 1.8 MMOL/L (ref 0.4–2)
LACTATE BLDA-SCNC: 2.1 MMOL/L (ref 0.4–2)
MAGNESIUM SERPL-MCNC: 2.03 MG/DL (ref 1.6–2.4)
MAGNESIUM SERPL-MCNC: 2.04 MG/DL (ref 1.6–2.4)
MCH RBC QN AUTO: 28.5 PG (ref 26–34)
MCH RBC QN AUTO: 28.6 PG (ref 26–34)
MCH RBC QN AUTO: 28.9 PG (ref 26–34)
MCHC RBC AUTO-ENTMCNC: 35.2 G/DL (ref 32–36)
MCHC RBC AUTO-ENTMCNC: 36.2 G/DL (ref 32–36)
MCHC RBC AUTO-ENTMCNC: 37.6 G/DL (ref 32–36)
MCV RBC AUTO: 76 FL (ref 80–100)
MCV RBC AUTO: 79 FL (ref 80–100)
MCV RBC AUTO: 82 FL (ref 80–100)
NRBC BLD-RTO: 0 /100 WBCS (ref 0–0)
NRBC BLD-RTO: 0 /100 WBCS (ref 0–0)
NRBC BLD-RTO: 0.3 /100 WBCS (ref 0–0)
OXYHGB MFR BLDA: 95.7 % (ref 94–98)
OXYHGB MFR BLDA: 96.3 % (ref 94–98)
PCO2 BLDA: 34 MM HG (ref 38–42)
PCO2 BLDA: 35 MM HG (ref 38–42)
PH BLDA: 7.42 PH (ref 7.38–7.42)
PH BLDA: 7.44 PH (ref 7.38–7.42)
PHOSPHATE SERPL-MCNC: 2 MG/DL (ref 2.5–4.9)
PHOSPHATE SERPL-MCNC: 2.3 MG/DL (ref 2.5–4.9)
PLATELET # BLD AUTO: 65 X10*3/UL (ref 150–450)
PLATELET # BLD AUTO: 66 X10*3/UL (ref 150–450)
PLATELET # BLD AUTO: 71 X10*3/UL (ref 150–450)
PO2 BLDA: 281 MM HG (ref 85–95)
PO2 BLDA: 78 MM HG (ref 85–95)
POTASSIUM BLDA-SCNC: 3.7 MMOL/L (ref 3.5–5.3)
POTASSIUM BLDA-SCNC: 4.1 MMOL/L (ref 3.5–5.3)
POTASSIUM SERPL-SCNC: 3.9 MMOL/L (ref 3.5–5.3)
POTASSIUM SERPL-SCNC: 3.9 MMOL/L (ref 3.5–5.3)
PROT SERPL-MCNC: 4.4 G/DL (ref 6.4–8.2)
RBC # BLD AUTO: 3.36 X10*6/UL (ref 4.5–5.9)
RBC # BLD AUTO: 3.43 X10*6/UL (ref 4.5–5.9)
RBC # BLD AUTO: 3.54 X10*6/UL (ref 4.5–5.9)
SAO2 % BLDA: 100 % (ref 94–100)
SAO2 % BLDA: 98 % (ref 94–100)
SODIUM BLDA-SCNC: 132 MMOL/L (ref 136–145)
SODIUM BLDA-SCNC: 132 MMOL/L (ref 136–145)
SODIUM SERPL-SCNC: 135 MMOL/L (ref 136–145)
SODIUM SERPL-SCNC: 136 MMOL/L (ref 136–145)
WBC # BLD AUTO: 4.7 X10*3/UL (ref 4.4–11.3)
WBC # BLD AUTO: 6.5 X10*3/UL (ref 4.4–11.3)
WBC # BLD AUTO: 7.6 X10*3/UL (ref 4.4–11.3)

## 2024-04-09 PROCEDURE — 2500000001 HC RX 250 WO HCPCS SELF ADMINISTERED DRUGS (ALT 637 FOR MEDICARE OP)

## 2024-04-09 PROCEDURE — 2020000001 HC ICU ROOM DAILY

## 2024-04-09 PROCEDURE — 82947 ASSAY GLUCOSE BLOOD QUANT: CPT

## 2024-04-09 PROCEDURE — 2500000001 HC RX 250 WO HCPCS SELF ADMINISTERED DRUGS (ALT 637 FOR MEDICARE OP): Performed by: NURSE PRACTITIONER

## 2024-04-09 PROCEDURE — 94003 VENT MGMT INPAT SUBQ DAY: CPT

## 2024-04-09 PROCEDURE — 2500000005 HC RX 250 GENERAL PHARMACY W/O HCPCS: Performed by: STUDENT IN AN ORGANIZED HEALTH CARE EDUCATION/TRAINING PROGRAM

## 2024-04-09 PROCEDURE — 37799 UNLISTED PX VASCULAR SURGERY: CPT | Performed by: STUDENT IN AN ORGANIZED HEALTH CARE EDUCATION/TRAINING PROGRAM

## 2024-04-09 PROCEDURE — 80069 RENAL FUNCTION PANEL: CPT

## 2024-04-09 PROCEDURE — 2500000005 HC RX 250 GENERAL PHARMACY W/O HCPCS

## 2024-04-09 PROCEDURE — 2500000004 HC RX 250 GENERAL PHARMACY W/ HCPCS (ALT 636 FOR OP/ED): Performed by: STUDENT IN AN ORGANIZED HEALTH CARE EDUCATION/TRAINING PROGRAM

## 2024-04-09 PROCEDURE — 2500000004 HC RX 250 GENERAL PHARMACY W/ HCPCS (ALT 636 FOR OP/ED): Performed by: PHYSICIAN ASSISTANT

## 2024-04-09 PROCEDURE — P9040 RBC LEUKOREDUCED IRRADIATED: HCPCS

## 2024-04-09 PROCEDURE — 2500000004 HC RX 250 GENERAL PHARMACY W/ HCPCS (ALT 636 FOR OP/ED): Mod: JZ

## 2024-04-09 PROCEDURE — 71045 X-RAY EXAM CHEST 1 VIEW: CPT

## 2024-04-09 PROCEDURE — C9113 INJ PANTOPRAZOLE SODIUM, VIA: HCPCS | Performed by: PHYSICIAN ASSISTANT

## 2024-04-09 PROCEDURE — 37799 UNLISTED PX VASCULAR SURGERY: CPT

## 2024-04-09 PROCEDURE — 99232 SBSQ HOSP IP/OBS MODERATE 35: CPT | Performed by: PHYSICIAN ASSISTANT

## 2024-04-09 PROCEDURE — 87493 C DIFF AMPLIFIED PROBE: CPT | Performed by: STUDENT IN AN ORGANIZED HEALTH CARE EDUCATION/TRAINING PROGRAM

## 2024-04-09 PROCEDURE — 99291 CRITICAL CARE FIRST HOUR: CPT | Performed by: STUDENT IN AN ORGANIZED HEALTH CARE EDUCATION/TRAINING PROGRAM

## 2024-04-09 PROCEDURE — 99233 SBSQ HOSP IP/OBS HIGH 50: CPT | Performed by: STUDENT IN AN ORGANIZED HEALTH CARE EDUCATION/TRAINING PROGRAM

## 2024-04-09 PROCEDURE — 2500000004 HC RX 250 GENERAL PHARMACY W/ HCPCS (ALT 636 FOR OP/ED)

## 2024-04-09 PROCEDURE — 83735 ASSAY OF MAGNESIUM: CPT

## 2024-04-09 PROCEDURE — 71045 X-RAY EXAM CHEST 1 VIEW: CPT | Performed by: RADIOLOGY

## 2024-04-09 PROCEDURE — P9047 ALBUMIN (HUMAN), 25%, 50ML: HCPCS | Mod: JZ

## 2024-04-09 PROCEDURE — 84132 ASSAY OF SERUM POTASSIUM: CPT

## 2024-04-09 PROCEDURE — 2500000001 HC RX 250 WO HCPCS SELF ADMINISTERED DRUGS (ALT 637 FOR MEDICARE OP): Performed by: STUDENT IN AN ORGANIZED HEALTH CARE EDUCATION/TRAINING PROGRAM

## 2024-04-09 PROCEDURE — P9047 ALBUMIN (HUMAN), 25%, 50ML: HCPCS | Mod: JZ | Performed by: STUDENT IN AN ORGANIZED HEALTH CARE EDUCATION/TRAINING PROGRAM

## 2024-04-09 PROCEDURE — 36430 TRANSFUSION BLD/BLD COMPNT: CPT

## 2024-04-09 PROCEDURE — 82330 ASSAY OF CALCIUM: CPT | Performed by: STUDENT IN AN ORGANIZED HEALTH CARE EDUCATION/TRAINING PROGRAM

## 2024-04-09 PROCEDURE — 85027 COMPLETE CBC AUTOMATED: CPT

## 2024-04-09 RX ORDER — ALBUMIN HUMAN 250 G/1000ML
SOLUTION INTRAVENOUS
Status: DISPENSED
Start: 2024-04-09 | End: 2024-04-09

## 2024-04-09 RX ORDER — MIDODRINE HYDROCHLORIDE 5 MG/1
5 TABLET ORAL ONCE
Status: COMPLETED | OUTPATIENT
Start: 2024-04-09 | End: 2024-04-09

## 2024-04-09 RX ORDER — NOREPINEPHRINE BITARTRATE/D5W 8 MG/250ML
PLASTIC BAG, INJECTION (ML) INTRAVENOUS
Status: COMPLETED
Start: 2024-04-09 | End: 2024-04-09

## 2024-04-09 RX ORDER — MIDODRINE HYDROCHLORIDE 10 MG/1
20 TABLET ORAL EVERY 8 HOURS
Status: DISCONTINUED | OUTPATIENT
Start: 2024-04-09 | End: 2024-04-17

## 2024-04-09 RX ORDER — ALBUMIN HUMAN 250 G/1000ML
25 SOLUTION INTRAVENOUS ONCE
Status: COMPLETED | OUTPATIENT
Start: 2024-04-09 | End: 2024-04-09

## 2024-04-09 RX ORDER — NOREPINEPHRINE BITARTRATE/D5W 8 MG/250ML
.01-.5 PLASTIC BAG, INJECTION (ML) INTRAVENOUS CONTINUOUS
Status: DISCONTINUED | OUTPATIENT
Start: 2024-04-09 | End: 2024-04-16

## 2024-04-09 RX ORDER — PHENYLEPHRINE HCL IN 0.9% NACL 0.4MG/10ML
SYRINGE (ML) INTRAVENOUS
Status: DISPENSED
Start: 2024-04-09 | End: 2024-04-09

## 2024-04-09 RX ORDER — ALBUMIN HUMAN 250 G/1000ML
SOLUTION INTRAVENOUS
Status: COMPLETED
Start: 2024-04-09 | End: 2024-04-09

## 2024-04-09 RX ORDER — HEPARIN SODIUM 5000 [USP'U]/ML
5000 INJECTION, SOLUTION INTRAVENOUS; SUBCUTANEOUS EVERY 8 HOURS
Status: DISCONTINUED | OUTPATIENT
Start: 2024-04-09 | End: 2024-04-11

## 2024-04-09 RX ADMIN — INSULIN LISPRO 4 UNITS: 100 INJECTION, SOLUTION INTRAVENOUS; SUBCUTANEOUS at 20:56

## 2024-04-09 RX ADMIN — ALBUMIN HUMAN 25 G: 0.25 SOLUTION INTRAVENOUS at 18:40

## 2024-04-09 RX ADMIN — ALBUMIN HUMAN 25 G: 250 SOLUTION INTRAVENOUS at 08:45

## 2024-04-09 RX ADMIN — NOREPINEPHRINE BITARTRATE 0.02 MCG/KG/MIN: 8 INJECTION, SOLUTION INTRAVENOUS at 10:20

## 2024-04-09 RX ADMIN — Medication 1 G: at 15:49

## 2024-04-09 RX ADMIN — MIDODRINE HYDROCHLORIDE 20 MG: 10 TABLET ORAL at 17:51

## 2024-04-09 RX ADMIN — INSULIN LISPRO 4 UNITS: 100 INJECTION, SOLUTION INTRAVENOUS; SUBCUTANEOUS at 15:49

## 2024-04-09 RX ADMIN — HYDROCORTISONE SODIUM SUCCINATE 25 MG: 100 INJECTION, POWDER, FOR SOLUTION INTRAMUSCULAR; INTRAVENOUS at 22:16

## 2024-04-09 RX ADMIN — MIDODRINE HYDROCHLORIDE 15 MG: 5 TABLET ORAL at 01:00

## 2024-04-09 RX ADMIN — FLUDROCORTISONE ACETATE 0.1 MG: 0.1 TABLET ORAL at 01:00

## 2024-04-09 RX ADMIN — INSULIN LISPRO 4 UNITS: 100 INJECTION, SOLUTION INTRAVENOUS; SUBCUTANEOUS at 23:04

## 2024-04-09 RX ADMIN — MIDODRINE HYDROCHLORIDE 20 MG: 10 TABLET ORAL at 10:00

## 2024-04-09 RX ADMIN — HEPARIN SODIUM 5000 UNITS: 5000 INJECTION INTRAVENOUS; SUBCUTANEOUS at 12:41

## 2024-04-09 RX ADMIN — Medication 40 PERCENT: at 08:00

## 2024-04-09 RX ADMIN — Medication 1 G: at 08:10

## 2024-04-09 RX ADMIN — INSULIN LISPRO 4 UNITS: 100 INJECTION, SOLUTION INTRAVENOUS; SUBCUTANEOUS at 00:30

## 2024-04-09 RX ADMIN — PANTOPRAZOLE SODIUM 40 MG: 40 INJECTION, POWDER, FOR SOLUTION INTRAVENOUS at 08:10

## 2024-04-09 RX ADMIN — SODIUM PHOSPHATE, MONOBASIC, MONOHYDRATE AND SODIUM PHOSPHATE, DIBASIC, ANHYDROUS 15 MMOL: 142; 276 INJECTION, SOLUTION INTRAVENOUS at 20:48

## 2024-04-09 RX ADMIN — Medication 1 G: at 23:30

## 2024-04-09 RX ADMIN — MIDODRINE HYDROCHLORIDE 5 MG: 5 TABLET ORAL at 05:57

## 2024-04-09 RX ADMIN — SODIUM PHOSPHATE, MONOBASIC, MONOHYDRATE AND SODIUM PHOSPHATE, DIBASIC, ANHYDROUS 15 MMOL: 142; 276 INJECTION, SOLUTION INTRAVENOUS at 13:52

## 2024-04-09 RX ADMIN — ALBUMIN HUMAN 25 G: 0.25 SOLUTION INTRAVENOUS at 08:45

## 2024-04-09 RX ADMIN — HEPARIN SODIUM 5000 UNITS: 5000 INJECTION INTRAVENOUS; SUBCUTANEOUS at 20:48

## 2024-04-09 RX ADMIN — FLUDROCORTISONE ACETATE 0.1 MG: 0.1 TABLET ORAL at 12:41

## 2024-04-09 RX ADMIN — HYDROCORTISONE SODIUM SUCCINATE 25 MG: 100 INJECTION, POWDER, FOR SOLUTION INTRAMUSCULAR; INTRAVENOUS at 11:23

## 2024-04-09 RX ADMIN — Medication 1 G: at 00:30

## 2024-04-09 ASSESSMENT — PAIN - FUNCTIONAL ASSESSMENT

## 2024-04-09 ASSESSMENT — PAIN SCALES - GENERAL
PAINLEVEL_OUTOF10: 0 - NO PAIN

## 2024-04-09 NOTE — PROGRESS NOTES
Department of Plastic and Reconstructive Surgery  Daily Progress Note    Patient Name: Jason Hollins  MRN: [id number]  Date:  04/09/24     Subjective   Remains critically ill in ICU. Responsive to commands, moving 3/4 extremities (continues to have little to no RLE movement). Incisional wound vac intact to posterior R thigh, without issue overnight. Return to OR with plastics this AM for R posterior thigh wound debridement canceled following discussion with pt family members who prefer to defer RTOR until pt with improved clinical stability. Will tentatively reschedule OR for Thursday 4/11 as clinical stability permits.      Objective   Vitals:    04/09/24 0900   BP:    Pulse: 101   Resp: (!) 34   Temp:    SpO2: 100%     Physical Exam   Constitutional: Alert, lying in pressure relieving bed in ICU, appears critically ill.   ENMT: MMM, dobhoff in R nare.  Cardiovascular: RRR on telemetry monitor.  Respiratory/Thorax: Trach to vent.  Gastrointestinal: Abdomen soft, protuberant.   Genitourinary: SLED.  Musculoskeletal: Able to squeeze bilateral hands, wiggles toes on the left.   Extremities: Generalized pitting edema. Right thigh edematous, but soft and compressible, no bruising or tissue induration noted. Flap recipient site and adjacent anterior R thigh incisions appear stable, interval slight development of expressible serous drainage from anterior incision line at flap edge approximation. Incision and native tissue overlying R posterior thigh (previously noted with maceration and darkened discoloration) dressed with intact incisional wound vac dressing, maintaining low continuous suction at -75mmHg, no alarms for leak, obstruction or malfunction, expectantly no output in collecting canister. ANDERSON drain x 3 right upper leg with dark serous outputs.  Neurological: Off sedation, alert and able to respond to commands with head nod.   Skin: Edematous, juandice, warm.     Current Medications  Scheduled medications  albumin  human, , ,   fludrocortisone, 0.1 mg, nasogastric tube, q12h  insulin lispro, 0-20 Units, subcutaneous, q4h  meropenem, 1 g, intravenous, q8h  midodrine, 20 mg, orogastric tube, q8h  oxygen, , inhalation, Continuous - Inhalation  pantoprazole, 40 mg, intravenous, Daily  phenylephrine HCl in 0.9% NaCl, , ,   sodium phosphate, 15 mmol, intravenous, Once    Continuous medications  lactated Ringer's, 5 mL/hr, Last Rate: 5 mL/hr (04/09/24 0900)  norepinephrine, 0.01-0.5 mcg/kg/min (Dosing Weight), Last Rate: 0.02 mcg/kg/min (04/09/24 1020)    PRN medications  PRN medications: albumin human, alteplase, calcium gluconate, calcium gluconate, dextrose **OR** glucagon, magnesium sulfate, magnesium sulfate, phenylephrine HCl in 0.9% NaCl, sodium chloride     Assessment   Jason Hollins 63 y.o. 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 Hawkins and Dr. Smith on 3/5/24. Postoperative course c/b arrest requiring CPR with ROSC after TPA for assumed large PE on 3/20. Increased swelling at flap site appreciated overnight 3/20-3/21 and pt returned to OR for exploration of R flap site, evacuation of hematoma, suture ligation of multiple bleeding vessel sites in gluteal area and wound closure with Dr. Smiht on 3/21. Pt has remained in ICU for continued critical care evaluation and management postoperatively.     Plan/Recommendations  - Return to OR with plastics this AM for R posterior thigh wound debridement canceled following discussion with pt family members who prefer to defer RTOR until pt with improved clinical stability   - Will tentatively reschedule OR for Thursday 4/11 as clinical stability permits for R posterior thigh tissue I&D with possible tissue advancement and complex closure        ·   Appreciate updated documentation of  clearance for RTOR per ICU care team prior to surgery        ·   Please ensure TF are held at [place] 4/11 in prep for OR       ·   T&S updated as of 4/8  - Maintain incisional wound vac to R posterior thigh wound site per plastic surgery         ·  Settings: -75 mmHg low continuous suction        ·  Dressing to be maintained until tentative return to OR 4/9       ·  If unable to return to OR 4/9, will follow up timing of next dressing change/removal        ·  Monitor/record vac canister output [id number]       ·  Notify plastic surgery with any concerns of leak or obstruction       ·  Anticipate next bedside incisional wound vac dressing change per plastics 4/10             -> Can re-assess site at this time to guide timing/necessity for debridement   - Monitor right thigh for s/s of bleeding recurrence or worsening progression, has been stable on serial assessments over past week   - Continue ANDERSON drain care to x3 drains present at R thigh flap reconstruction site      ·  Strip drain tubing TID and PRN     ·  Monitor and record output q8h      ·  Keep drain sites C/D/I      ·  Notify plastics if ANDERSON drain output exceeds > 100 ml/hr in one drain or > 300 ml/hr collectively  - Avoid pressure application or positioning onto flap site or incisions at R upper leg   - Recommend patient be positioned off of R side as able to prevent flap compression/wound breakdown   - Continue clinitron fluidized air mattress  - Appreciate remaining supportive care per ICU   - Plastic Surgery will continue to follow     Patient and plan discussed with Dr. Smith.     Gracie Smith PA-C  Plastic and Reconstructive Surgery   Pager #[phone]  Team phones: [phone]

## 2024-04-09 NOTE — PROGRESS NOTES
Jason Hollins is a 63 y.o. male on day 35 of admission presenting with Soft tissue sarcoma (CMS/HCC).      Subjective   4/9: Pt seen resting in bed. Family at bedside. Trach to vent FiO2 40%. No urine output - bladder scan had 190mL. Hypotensive again back on levophed.       Objective          Vitals 24HR  Heart Rate:  []   Temp:  [36.6 °C (97.9 °F)-37 °C (98.6 °F)]   Resp:  [15-35]   BP: (130-136)/(40-45)   SpO2:  [94 %-100 %]         Intake/Output last 3 Shifts:    Intake/Output Summary (Last 24 hours) at 4/9/2024 1144  Last data filed at 4/9/2024 0900  Gross per 24 hour   Intake 1295 ml   Output 400 ml   Net 895 ml         Physical Exam  General appearance: intubated, trached  Heart: RRR  Lungs: FiO2 40% on vent via trach  : no Marino  ACCESS:  right I J trialysis    Current Facility-Administered Medications:     albumin human solution  - Omnicell Override Pull, , , ,     alteplase (Cathflo Activase) injection 2 mg, 2 mg, intra-catheter, PRN, Gracie DUNBAR Ulsanjayic, APRN-CNP    calcium gluconate in NS IV 1 g, 1 g, intravenous, q6h PRN, Gracie C Uljanic, APRN-CNP, Stopped at 04/08/24 0335    calcium gluconate in NS IV 2 g, 2 g, intravenous, q6h PRN, Gracie C Uljanic, APRN-CNP, Last Rate: 100 mL/hr at 04/04/24 1118, 2 g at 04/04/24 1118    dextrose 50 % injection 25 g, 25 g, intravenous, q15 min PRN **OR** glucagon (Glucagen) injection 1 mg, 1 mg, intramuscular, q15 min PRN, Gracie C Uljanic, APRN-CNP    fludrocortisone (Florinef) tablet 0.1 mg, 0.1 mg, nasogastric tube, q12h, Gracie C Uljanic, APRN-CNP, 0.1 mg at 04/09/24 0100    heparin (porcine) injection 5,000 Units, 5,000 Units, subcutaneous, q8h, Mariluz Dailey MD    hydrocortisone sod succ (PF) (Solu-CORTEF) injection 25 mg, 25 mg, intravenous, q12h, PHILLIP Alejo-CNP, 25 mg at 04/09/24 1123    insulin lispro (HumaLOG) injection 0-20 Units, 0-20 Units, subcutaneous, q4h, RAQUEL Bernal, 4 Units at 04/09/24 0030     lactated Ringer's infusion, 5 mL/hr, intravenous, Continuous, Gordon Nieves MD, Last Rate: 5 mL/hr at 04/09/24 0900, 5 mL/hr at 04/09/24 0900    magnesium sulfate IV 2 g, 2 g, intravenous, q6h PRN, PHILLIP Bernal-CNP, Stopped at 03/24/24 1726    magnesium sulfate IV 4 g, 4 g, intravenous, q6h PRN, PHILLIP Bernal-CNP, Stopped at 04/05/24 0838    meropenem (Merrem) in sodium chloride 0.9 % 100 mL IV 1 g, 1 g, intravenous, q8h, Mariluz Dailey MD, Stopped at 04/09/24 0840    midodrine (Proamatine) tablet 20 mg, 20 mg, orogastric tube, q8h, Mariluz Dailey MD, 20 mg at 04/09/24 1000    norepinephrine (Levophed) 8 mg in dextrose 5% 250 mL (0.032 mg/mL) infusion (premix), 0.01-0.5 mcg/kg/min (Dosing Weight), intravenous, Continuous, Mariluz Dailey MD, Last Rate: 1.89 mL/hr at 04/09/24 1047, 0.01 mcg/kg/min at 04/09/24 1047    oxygen (O2) therapy, , inhalation, Continuous - Inhalation, Mariluz Dailey MD, Rate Verify at 04/09/24 0415    pantoprazole (ProtoNix) injection 40 mg, 40 mg, intravenous, Daily, Alesha Mckeon PA-C, 40 mg at 04/09/24 0810    phenylephrine HCl in 0.9% NaCl syringe  - Omnicell Override Pull, , , ,     sodium chloride (Ocean) 0.65 % nasal spray 1 spray, 1 spray, Each Nostril, 4x daily PRN, Osman Perkins MD    sodium phosphate 15 mmol in sodium chloride 0.9% 250 mL IV, 15 mmol, intravenous, Once, Mariluz Dailey MD      Assessment/Plan   Jason Hollins is a 63 y.o. 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 Hawkins and Dr. Smith on 3/5/24. On 3/20, he developed massive PE and was given TPA, taken to OR in setting of increased swelling at flap site- hematoma for exploration and evacuation. Nephrology following for RUTH.    #Acute Kidney Injury on Dialysis (RUTH-D), anuric  - etiology  hemodynamic from hemorrhagic shock  - began on CVVH since 3/21 via R I J trialysis and transitioned to SLED on 4/2  - Cr baseline 0.6  - electrolytes: hypophos 2.0; K and HCO3 WNL  - no urine output in 24h; Bladder scan 190mL; I/O: +1.1L  - FiO2 40% on vent - trach  - back on levophed 0.02  - s/p SLED 4/7-4/8 - was hypotensive    Myxoid Chondrosarcoma s/p wide resection 3/5/2024  Thrombocytopenia  - pending OR today on 4/9 for thigh debridement    Plan  - plan on SLED tonight as BP likely won't tolerate iHD  - do not check BMP during SLED as electrolytes will not be accurate; instead, check BMP >4h after SLED conclusion  - continue bladder scan once per shift to monitor for renal recovery  - ensure phosphorus is replaced as low phos can lead to muscle weakness, especially respiratory    D/w Dr. Yolanda Duffy MD  Nephrology Fellow PGY 5  Contact via secure chat  Nephrology Pager # [phone] ([phone])

## 2024-04-09 NOTE — PROGRESS NOTES
Physical Therapy                 Therapy Communication Note    Patient Name: Jason Hollins  MRN: [id number]  Today's Date: 4/9/2024     Discipline: Physical Therapy    Missed Visit Reason: Missed Visit Reason:  (Pt going to OR today.  Will hold PT and re-attempt post-op.)    Missed Time: Attempt

## 2024-04-09 NOTE — PROGRESS NOTES
"04/09 REVIEW   Same as below. . Plan - OR Wound Debridement w/wo Flap Closure Leg (Right) - Right gluteus/hip Incision and debridement with closure. Nephro plan for SLEd tonight as BP will likely not tolerate iHD    04/02 REVIEW    4/1 TRACHE. Nephro still following for CVVH d/t RUTH. Follow for whether pt. To dc on dialysis in which IR only puts in HD tunneled line 1-2 days before certain dc date. Wound Vac RLE. DISPO - will most likely change dispo to LTACH (auth needed with [employer]). Awaiting for team to discuss tentative dc date & LTACH planning with wife, Lakisha & family.      03/29 REVEW  Same as below but plan for trache 4/1 which will most likely change dispo to LTACH (auth needed with [employer]).      03/28 REVIEW   Continues requiring ICU loc - still intubated (7d), on CVVH, Palliative Oncology consulted 3/24 for soft tissue sarcoma & family support. 3/25 rt. Soleal DVT (C: Vasc Sx) Not clear if dc plan will remain for [facility], [facility] or [facility] in which case need to re-connect with closer to dc and subsequently need [employer] auth.       03/25 REVIEW  Continues requiring ICU loc - still intubated (5d), on CVVH, and now Palliative Oncology consulted 3/24 for family support. Not clear if dc plan will remain for [facility], [facility] or [facility].          03/22 1045  Services for patient keep changing now entirely on Plastics (CTICU) and per today's Plastic Surgery -- \"Remains critically ill in CTICU,  CRRT initiated overnight. Remains intubated/sedated with levo, vaso, epi, angiotensin II running Overnight Events Dialysis line placed, CVVHD initiated. Reviewed above notes copied to this progress note.... +added [facility] and provided SNFs - [facility] update. To continue to follow and if SNF still dc plan, obtain foc from patient/family when appropritate.      Chantelle Ramirez (LSW, MSW)     "

## 2024-04-09 NOTE — PROGRESS NOTES
"Jason Hollins is a 63 y.o. male on day 35 of admission presenting with Soft tissue sarcoma (CMS/HCC).    Subjective   Overnight, patient given midodrine 5 mg to supplement evening midodrine 15 mg dose for hypotension.  This morning, patient given concentrated albumin, scheduled midodrine 20 mg, and started on Levophed 0.024 blood pressure support.  Patient is hemodynamically stable and very responsive to Levophed.    Patient continues to be alert, responsive to commands and denies significant pain this morning. Has had 1 BM per shift.       Objective     Physical Exam  Constitutional:       Appearance: Normal appearance.   Eyes:      Extraocular Movements: Extraocular movements intact.   Neck:      Comments: Trach site is clean, dry, intact without surrounding swelling or bleeding  Cardiovascular:      Rate and Rhythm: Normal rate and regular rhythm.      Heart sounds: No murmur heard.  Pulmonary:      Comments: Respiratory crackles and diminished breath sounds auscultated bilaterally  Abdominal:      General: Abdomen is flat. Bowel sounds are normal. There is no distension.      Palpations: Abdomen is soft.   Musculoskeletal:      Comments: 2+ pitting edema noted bilaterally lower extremities (consistent with prior exam)   Skin:     General: Skin is warm.   Neurological:      Mental Status: He is alert.      Comments: Alert, responsive to verbal commands including squeezing hands bilaterally, moving left foot to command.  Patient does not move right upper extremity and (consistent with prior exams)         Last Recorded Vitals  Blood pressure (!) 136/45, pulse 101, temperature 37 °C (98.6 °F), temperature source Temporal, resp. rate (!) 34, height 1.778 m (5' 10\"), weight 125 kg (275 lb 9.2 oz), SpO2 100 %.  Intake/Output last 3 Shifts:  I/O last 3 completed shifts:  In: 2260 (18.1 mL/kg) [I.V.:240 (1.9 mL/kg); NG/GT:1470; IV Piggyback:550]  Out: 550 (4.4 mL/kg) [Drains:550]  Weight: 125 kg     Relevant " Results  Scheduled medications  albumin human, , ,   fludrocortisone, 0.1 mg, nasogastric tube, q12h  hydrocortisone sodium succinate, 25 mg, intravenous, q12h  insulin lispro, 0-20 Units, subcutaneous, q4h  meropenem, 1 g, intravenous, q8h  midodrine, 20 mg, orogastric tube, q8h  oxygen, , inhalation, Continuous - Inhalation  pantoprazole, 40 mg, intravenous, Daily  phenylephrine HCl in 0.9% NaCl, , ,   sodium phosphate, 15 mmol, intravenous, Once      Continuous medications  lactated Ringer's, 5 mL/hr, Last Rate: 5 mL/hr (04/09/24 0900)  norepinephrine, 0.01-0.5 mcg/kg/min (Dosing Weight), Last Rate: 0.01 mcg/kg/min (04/09/24 1047)      PRN medications  PRN medications: albumin human, alteplase, calcium gluconate, calcium gluconate, dextrose **OR** glucagon, magnesium sulfate, magnesium sulfate, phenylephrine HCl in 0.9% NaCl, sodium chloride  Results for orders placed or performed during the hospital encounter of 03/05/24 (from the past 24 hour(s))   Blood Gas Arterial Full Panel   Result Value Ref Range    POCT pH, Arterial 7.45 (H) 7.38 - 7.42 pH    POCT pCO2, Arterial 35 (L) 38 - 42 mm Hg    POCT pO2, Arterial 79 (L) 85 - 95 mm Hg    POCT SO2, Arterial 99 94 - 100 %    POCT Oxy Hemoglobin, Arterial 96.0 94.0 - 98.0 %    POCT Hematocrit Calculated, Arterial 29.0 (L) 41.0 - 52.0 %    POCT Sodium, Arterial 134 (L) 136 - 145 mmol/L    POCT Potassium, Arterial 3.5 3.5 - 5.3 mmol/L    POCT Chloride, Arterial 105 98 - 107 mmol/L    POCT Ionized Calcium, Arterial 1.11 1.10 - 1.33 mmol/L    POCT Glucose, Arterial 182 (H) 74 - 99 mg/dL    POCT Lactate, Arterial 1.7 0.4 - 2.0 mmol/L    POCT Base Excess, Arterial 0.5 -2.0 - 3.0 mmol/L    POCT HCO3 Calculated, Arterial 24.3 22.0 - 26.0 mmol/L    POCT Hemoglobin, Arterial 9.5 (L) 13.5 - 17.5 g/dL    POCT Anion Gap, Arterial 8 (L) 10 - 25 mmo/L    Patient Temperature 37.0 degrees Celsius    FiO2 30 %   POCT GLUCOSE   Result Value Ref Range    POCT Glucose 164 (H) 74 - 99  mg/dL   POCT GLUCOSE   Result Value Ref Range    POCT Glucose 159 (H) 74 - 99 mg/dL   Blood Gas Arterial Full Panel   Result Value Ref Range    POCT pH, Arterial 7.44 (H) 7.38 - 7.42 pH    POCT pCO2, Arterial 35 (L) 38 - 42 mm Hg    POCT pO2, Arterial 281 (H) 85 - 95 mm Hg    POCT SO2, Arterial 100 94 - 100 %    POCT Oxy Hemoglobin, Arterial 96.3 94.0 - 98.0 %    POCT Hematocrit Calculated, Arterial 30.0 (L) 41.0 - 52.0 %    POCT Sodium, Arterial 132 (L) 136 - 145 mmol/L    POCT Potassium, Arterial 3.7 3.5 - 5.3 mmol/L    POCT Chloride, Arterial 102 98 - 107 mmol/L    POCT Ionized Calcium, Arterial 1.12 1.10 - 1.33 mmol/L    POCT Glucose, Arterial 153 (H) 74 - 99 mg/dL    POCT Lactate, Arterial 2.1 (H) 0.4 - 2.0 mmol/L    POCT Base Excess, Arterial -0.1 -2.0 - 3.0 mmol/L    POCT HCO3 Calculated, Arterial 23.8 22.0 - 26.0 mmol/L    POCT Hemoglobin, Arterial 9.9 (L) 13.5 - 17.5 g/dL    POCT Anion Gap, Arterial 10 10 - 25 mmo/L    Patient Temperature 37.0 degrees Celsius    FiO2 40 %   CALCIUM, IONIZED   Result Value Ref Range    POCT Calcium, Ionized 1.10 1.1 - 1.33 mmol/L   CBC   Result Value Ref Range    WBC 4.7 4.4 - 11.3 x10*3/uL    nRBC 0.0 0.0 - 0.0 /100 WBCs    RBC 3.36 (L) 4.50 - 5.90 x10*6/uL    Hemoglobin 9.6 (L) 13.5 - 17.5 g/dL    Hematocrit 25.5 (L) 41.0 - 52.0 %    MCV 76 (L) 80 - 100 fL    MCH 28.6 26.0 - 34.0 pg    MCHC 37.6 (H) 32.0 - 36.0 g/dL    RDW 19.3 (H) 11.5 - 14.5 %    Platelets 65 (L) 150 - 450 x10*3/uL   Hepatic function panel   Result Value Ref Range    Albumin 2.4 (L) 3.4 - 5.0 g/dL    Bilirubin, Total 27.4 (H) 0.0 - 1.2 mg/dL    Bilirubin, Direct 19.5 (H) 0.0 - 0.3 mg/dL    Alkaline Phosphatase 312 (H) 33 - 136 U/L    ALT 27 10 - 52 U/L     (H) 9 - 39 U/L    Total Protein 4.4 (L) 6.4 - 8.2 g/dL   Magnesium   Result Value Ref Range    Magnesium 2.03 1.60 - 2.40 mg/dL   Basic Metabolic Panel   Result Value Ref Range    Glucose 161 (H) 74 - 99 mg/dL    Sodium 136 136 - 145 mmol/L     Potassium 3.9 3.5 - 5.3 mmol/L    Chloride 101 98 - 107 mmol/L    Bicarbonate 25 21 - 32 mmol/L    Anion Gap 14 10 - 20 mmol/L    Urea Nitrogen 45 (H) 6 - 23 mg/dL    Creatinine 1.63 (H) 0.50 - 1.30 mg/dL    eGFR 47 (L) >60 mL/min/1.73m*2    Calcium 8.0 (L) 8.6 - 10.6 mg/dL   Phosphorus   Result Value Ref Range    Phosphorus 2.0 (L) 2.5 - 4.9 mg/dL   POCT GLUCOSE   Result Value Ref Range    POCT Glucose 98 74 - 99 mg/dL   POCT GLUCOSE   Result Value Ref Range    POCT Glucose 90 74 - 99 mg/dL   Prepare RBC: 1 Units   Result Value Ref Range    PRODUCT CODE I3769L16     Unit Number [id number]     Unit ABO O     Unit RH POS     XM INTEP COMP     Dispense Status XM     Blood Expiration Date May 05, 2024 23:59 EDT     PRODUCT BLOOD TYPE 5100     UNIT VOLUME 292                     Assessment/Plan   Principal Problem:    Soft tissue sarcoma (CMS/HCC)  Active Problems:    Acute kidney injury (nontraumatic) (CMS/HCC)    Pulmonary embolus (CMS/HCC)    Anemia    Thrombocytopenia (CMS/HCC)    Extraskeletal myxoid chondrosarcoma (CMS/HCC)    Cardiopulmonary arrest (CMS/HCC)    Acute respiratory failure with hypoxia (CMS/HCC)    Tracheostomy hemorrhage (CMS/HCC)    Postoperative wound breakdown, initial encounter    Jason Hollins is a 63 y.o. 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 Hawkins and Dr. Smith on 3/5/24.  Post operative course was uncomplicated and patient was on RNF until 3/20 for prolonged bed rest to avoid hip flexion to maintain flap integrity.  Patient was on prophylactic lovenox while on bedrest.      3/20: Cardiopulmonary arrest s/p CPR with ROSC after TPA, overnight started on heparin, epo, increased pressor requirements, increased swelling at flap site.      3/21: Hemorrhagic shock, MTP. Firm and large right  flap site. Return to OR s/p Exploration of right thigh wound, Evacuation of hematoma. Suture ligation of multiple bleeding vessel and several points in gluteal area.      4/1: s/p Trach     4/4: return OR with ENT s/p laryngoscopy, esophagoscopy and bronchoscopy, trach revision. Found extensive clot burden in esophagus and stomach as well as in the lungs. Oozing at thyroid bed cauterized. Trach exchanged to new 6.0 prox XLT shiley. OR findings:  blood up glottis and from thyroid bed to airway.     4/9: Patient is medically stable for OR on 4/9/24.  He is appropriately n.p.o. since midnight and has had more DVT prophylaxis held for OR today. Per discussion with Plastic surgery, patient is 2nd start case in OR today.     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 Hawkins and Dr. Smith on 3/5/24 s/p cardiopulmonary arrest with ROSC 3/20. CT head 3/22 without acute process. Weaned off cisatracurium and propofol overnight 3/23-3/24. Ketamine weaned off 3/25 and Fentanyl weaned off 3/26 am. Repeat CT Head 3/26 without acute process. MRI Brain 3/30, negative for cerebral infarction or hemorrhage. Neuro exam - off sedation, following commands except for RLE, tracking, nodding/shaking head to questions  - ongoing neuro and pain assessments  - keep off sedation  - PT/OT -> AROM  - no need for soft wrist restraints at this time -> continue to reassess     CV: History of HTN, HLD. Acute cardiopulmonary arrest 2/2 to possible massive PE vs massive STEMI, received multiple rounds of CPR and one defibrillation.  Sustained ROSC achieved after TPA bolus. On high dose levophed, epinephrine, vaso, angioT2. Plan on 3/21 was for ECMO which was aborted secondary to large hemhorrge at right flap site. Had MTP and taken OR with 5L evacuated.  ECHO 3/21: Normal LV, Mod enlarged RV, slight suggestion of a Townsend's sign / RV strain, low normal RV function. ECHO 3/22 with hyperdynamic LV. New onset Afib with RVR overnight 3/22-3/23, dosed with 150mg amio x2, started infusion at 1mg/min thereafter. AT2 weaned off. Amio infusion discontinued 3/25. Lactate downtrending. Vaso and epi weaned off. Remains in NSR. iEpo weaned off 3/27. Repeat TTE 3/29 and 4/2 essentially unchanged. Levo resumed 4/2 evening to continue aggressive fluid removal. Off of pressors since 4/5 evening.  - continuous EKG/abp/cvp monitoring  - Goal map range 65-90  -Increase midodrine to 20 mg 3 times daily  - Continue florinef 0.1mg BID   -Restarted hydrocortisone 25 mg twice daily given low MAP this morning  -Patient currently on Levophed 0.02 for blood pressure support.  Anticipate that this can be weaned successfully with restarting steroids and scheduled midodrine dose today.  Will plan on weaning off with goal MAP greater than 65  - Holding heparin infusion for now      PULM: Arrived to SICU intubated julio c-CPR. Concern for massive PE. Started on iEpo, currently on 0.05. Hypoxic respiratory failure. Severe ARDS. ETT exchanged 3/23. CT Chest 3/26 with interval JOHN consolidative/ground glass opacity. S/p Tracheostomy on 4/1. Bedside bronch -> some blood in trach, posterior wall tissue just distal to trach tip appears to be collapsing on cannula.  -  RT to try pressure support ventilation today for few hours at PS 10 and PEEP 10, FiO2 40%  - ABGs prn  - additional pulm toilet prn -will discuss regimen with RT  - daily CXR -chest x-ray today indicates similar bilateral hazy opacities, consistent with prior day     ENT: Had epistaxis 3/23, completed afrin bid x3 days. S/p trach on 4/1. Bleeding from trach site 4/2 am, packing placed by ENT. Repeat episode of bloody tracheal secretions 4/3 through this am. Taken back to OR with ENT 4/4 as per above.  -No bleeding from trach site noted      GI: NPO. Shock liver, pancreatitis. Elevated TG. Elevated lactate. Consulted ACS for potential bowel ischemia as cause of persistent lactic acidosis. CT imaging 3/22 with evidence of pancreatitis. No acute surgical indication per ACS. RUQ US 3/22 with thickening of GB wall without cholelithiasis. CT A/P 3/26 negative for acute bleed. LFTs downtrending. Worsening hyperbilirubinemia. Amylase and lipase now WNL. Repeat RUQ US 4/1 with nonspecific gallbladder wall edema and thickening which may be 2/2 generalized fluid overload, mild hepatomegaly with slight nodular contour and c/f steatosis and fibrosis, irregular hypoechoic region adjacent to hepatic veins. US liver with doppler 4/2 revealed hepatomegaly with nodular contour, mildly elevated RI in main and left hepatic arteries. Hyperbilirubinemia  -Patient has been n.p.o. since midnight 4 OR today.  Will plan on resuming tube feeds postop  - prostat daily  - PPI daily for GI prophy  - Trend LFTs daily -AST/ALT/total bilirubin is approximately similar to prior days.  Per discussion with hepatology yesterday, minor fluctuations are to be expected prior to eventual downtrend     : No history of renal disease. Baseline creatinine 0.6. Anuric RUTH. Elevated CK, downtrending. Metabolic acidosis, total body fluid overload, hyperkalemia. Started on CVVH 3/21, stopped bicarb infusion 3/22. Marino removed 3/24. Hyponatremia resolved. Stopped CVVH 4/2.  Has been tolerating SLED per nephrology recs  - Nephrology following -will follow up on plans  - RFP BID and PRN  - Replete electrolytes judiciously - RFP to be drawn 4 hours after SLED for repletion of electrolytes  - Bladder scan pending this AM      HEME: Patient was on ppx lovenox for DVT prophylaxis prior to cardiopulmonary arrest. Concern for massive PE patient received bolus of TPA during CPR. Started on heparin infusion overnight given concern for PE. Hemorrhagic shock 3/21, MTP and back to OR s/p Exploration of right  thigh woundEvacuation of hematoma. Suture ligation of multiple bleeding vessel and several points in gluteal area. Hematology consulted 3/22 to r/o HLH. Transfused 1 RBC overnight 3/22-3/23. Thrombocytopenia, coagulapathy, hypofibrinogenemia. LE Duplex 3/25 +acute occlusive R soleal DVT. Received 2u PRBC and 1u FFP on 3/26. Heparin infusion discontinued 3/26 to r/o HIT and transitioned to bival. PF4 negative, resumed heparin infuision 3/27. Elevated IL2R and decreased NK function. C/f HLH (low suspicion), empirically treated with decadron (3/28-3/31). Thrombocytopenia on 3/30 to 18k, received 5pk, did not increment. Heparin held. Thrombocytopenia likely 2/2 to consumptive process, hematology following. Received IVIG and 5pk plts 3/31. Pancytopenia persists, improving thrombocytopenia  - cbc, coags bid and prn  -Hemoglobin stable at 9.6 (prior hemoglobin 9.7)  -Transfusing 1 unit packed RBC today in anticipation for OR and blood pressure support  -Holding DVT prophy for OR today.  Plan on restarting postop  - Held pRBC, FFP, Plt for anticipation of OR on 4/9  - Hematology following, appreciate recs -> maintain Plt count >35k  - ongoing monitoring for s/s bleeding  - close surveillance of RLE and drains  - Vasc Med signed off 3/27  - maintain active T&S (4/8)     ENDO: History of DM2. A1c 7.5%. TSH WNL 1/2024. Hyperglycemia  -Patient received Lantus 8u last night given NPO status. Will monitor blood glucose today.  Once tube feeds are resumed, plan on resuming Lantus 16 units twice daily  - q4h accuchecks and SSI #4     ID: Leukocytosis resolved, now with leukopenia. On broad antimicrobial therapy. MRSA screen + 2/28/24. Pan cultures sent 3/22. Sputum NTF, +MRSA screen (completed 5 day course mupirocin), UCx and BCx negative. Completed 7 day course vanc/zosyn 3/27. Febrile to 39.1 4/3, resent blood cultures and BAL and started vanco and zosyn. Switched zosyn to reynaldo, kept on vanco, added john. Blood cultures and  sputum with Klebsiella. Repeat blood cultures sent 4/4.  - F/U cultures  - temp q4h, wbc daily  - Per ID - Meropenem dose changed to 1g q8hr with end date 4/14/24  - ongoing monitoring for s/s infection  - plastics team noted some darkened discoloration of the native tissue at the R posterior hip incision that is c/f necrosis -debridement today     Lines:  - right radial a line (4/5)  - RIJ trialysis (3/27)  - PIV x2    Patient seen and discussed with fellow and Dr. Cam Dailey, PGY-1  SICU team

## 2024-04-09 NOTE — ANESTHESIA PREPROCEDURE EVALUATION
Patient: Jason Hollins    Procedure Information       Date: 04/11/24    Procedure: Wound Debridement w/wo Flap Closure Leg (Right) - Right gluteus/hip Incision and debridement with closure    Location: [facility] OR    Surgeons: Angy Smith MD            Jason Hollins is a 63 y.o. 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 Hawkins and Dr. Smith on 3/5/24.  Post operative course was uncomplicated and patient was on RNF until 3/20 for prolonged bed rest to avoid hip flexion to maintain flap integrity.  Patient was on prophylactic lovenox while on bedrest.      3/20: Cardiopulmonary arrest s/p CPR with ROSC after TPA, overnight started on heparin, epo, increased pressor requirements, increased swelling at flap site.      3/21: Hemorrhagic shock, MTP. Firm and large right flap site. Return to OR s/p Exploration of right thigh wound, Evacuation of hematoma. Suture ligation of multiple bleeding vessel and several points in gluteal area.      4/1: s/p Trach     4/4: return OR with ENT s/p laryngoscopy, esophagoscopy and bronchoscopy, trach revision. Found extensive clot burden in esophagus and stomach as well as in the lungs. Oozing at thyroid bed cauterized. Trach exchanged to new 6.0 prox XLT shiley. OR findings:  blood up glottis and from thyroid bed to airway.     4/10: Incisional wound vac placed at bedside by surgical team. Possible plan to return to OR tomorrow for R posterior thigh wound debridement pending stability given new pressor requirements. On norepinephrine, stress dose steroids increased this AM and repeat echo scheduled   Patient appropriately Npo at midnight and SQH 4/11 AM dose to be held    Relevant Problems   Cardiac   (+) Cardiopulmonary arrest (CMS/HCC)   (+) Hyperlipidemia      Pulmonary  S/p  trach 4/1   (+) Pulmonary embolus (CMS/HCC)      GI   (+) Gastroesophageal reflux disease without esophagitis      /Renal   (+) Acute kidney injury (nontraumatic) (CMS/HCC) (Previously on CVVH - stopped 4/2. Now receiving SLED)      Endocrine   (+) Class 2 obesity with body mass index (BMI) of 38.0 to 38.9 in adult   (+) Current chronic use of systemic steroids   (+) DM type 2 without retinopathy (CMS/HCC)   (+) Diabetes mellitus type 2 in obese   (+) Severe obesity (BMI 35.0-39.9) with comorbidity (CMS/HCC)   (+) Type 2 diabetes mellitus with hyperglycemia (CMS/HCC)      Hematology   (+) Anemia   (+) Thrombocytopenia (CMS/HCC)      ID   (+) Candidal intertrigo   (+) Viral illness         ECHO: 4/2/24  CONCLUSIONS:   1. Left ventricular systolic function is normal with a 65-70% estimated ejection fraction.   2. Poorly visualized anatomical structures due to suboptimal image quality.   3. Right ventricular volume overload.   4. RV not well seen: in some views possibly dilated with mildly reduced function, but not quantified.   5. There is reduced right ventricular systolic function.   6. Compared with the prior exam from 3/29/2024, both studies were technically difficult and limited. Overall no significant changes.     Clinical information reviewed:   Tobacco  Allergies  Meds  Problems  Med Hx  Surg Hx   Fam Hx  Soc   Hx      Vitals:    04/09/24 1300   BP:    Pulse: 97   Resp: (!) 33   Temp:    SpO2: 96%       Past Surgical History:   Procedure Laterality Date    CT GUIDED PERCUTANEOUS BIOPSY MUSCLE  11/13/2023    CT GUIDED PERCUTANEOUS BIOPSY MUSCLE 11/13/2023 [facility] CT    INVASIVE VASCULAR PROCEDURE N/A 3/20/2024    Procedure: Pulmonary Angiogram;  Surgeon: Axel Wolfe MD;  Location: [place] [address] Cardiac Cath Lab;  Service: Cardiovascular;  Laterality: N/A;    OTHER SURGICAL HISTORY  05/31/2016    History Of Prior Surgery     Past Medical History:   Diagnosis Date    Diabetes mellitus (CMS/HCC)     Hyperlipidemia      Morbid (severe) obesity due to excess calories (CMS/[facility]) 05/31/2016    Severe obesity (BMI 35.0-39.9) with comorbidity       Current Facility-Administered Medications:     albumin human solution  - Omnicell Override Pull, , , ,     alteplase (Cathflo Activase) injection 2 mg, 2 mg, intra-catheter, PRN, Gracie DUNBAR Uljanic, APRN-CNP    calcium gluconate in NS IV 1 g, 1 g, intravenous, q6h PRN, Gracie C Uljanic, APRN-CNP, Stopped at 04/08/24 0335    calcium gluconate in NS IV 2 g, 2 g, intravenous, q6h PRN, Gracie C Uljanic, APRN-CNP, Last Rate: 100 mL/hr at 04/04/24 1118, 2 g at 04/04/24 1118    dextrose 50 % injection 25 g, 25 g, intravenous, q15 min PRN **OR** glucagon (Glucagen) injection 1 mg, 1 mg, intramuscular, q15 min PRN, Gracie C Uljanic, APRN-CNP    fludrocortisone (Florinef) tablet 0.1 mg, 0.1 mg, nasogastric tube, q12h, Gracie C Uljankrishna, APRN-CNP, 0.1 mg at 04/09/24 1241    heparin (porcine) injection 5,000 Units, 5,000 Units, subcutaneous, q8h, Mariluz Dailey MD, 5,000 Units at 04/09/24 1241    hydrocortisone sod succ (PF) (Solu-CORTEF) injection 25 mg, 25 mg, intravenous, q12h, Remy Peter, APRN-CNP, 25 mg at 04/09/24 1123    insulin lispro (HumaLOG) injection 0-20 Units, 0-20 Units, subcutaneous, q4h, Gracie Skaggs, APRN-CNP, 4 Units at 04/09/24 0030    lactated Ringer's infusion, 5 mL/hr, intravenous, Continuous, Gordon Nieves MD, Last Rate: 5 mL/hr at 04/09/24 1300, 5 mL/hr at 04/09/24 1300    magnesium sulfate IV 2 g, 2 g, intravenous, q6h PRN, Gracie C Uljanic, APRN-CNP, Stopped at 03/24/24 1726    magnesium sulfate IV 4 g, 4 g, intravenous, q6h PRN, RAQUEL Bernal, Stopped at 04/05/24 0838    meropenem (Merrem) in sodium chloride 0.9 % 100 mL IV 1 g, 1 g, intravenous, q8h, Mariluz Dailey MD, Stopped at 04/09/24 0840    midodrine (Proamatine) tablet 20 mg, 20 mg, orogastric tube, q8h, Mariluz Dailey MD, 20 mg at 04/09/24 1000    norepinephrine  (Levophed) 8 mg in dextrose 5% 250 mL (0.032 mg/mL) infusion (premix), 0.01-0.5 mcg/kg/min (Dosing Weight), intravenous, Continuous, Mariluz Dailey MD, Last Rate: 1.89 mL/hr at 04/09/24 1300, 0.01 mcg/kg/min at 04/09/24 1300    oxygen (O2) therapy, , inhalation, Continuous - Inhalation, Mariluz Dailey MD, Rate Verify at 04/09/24 0415    pantoprazole (ProtoNix) injection 40 mg, 40 mg, intravenous, Daily, Alesha Mckeon PA-C, 40 mg at 04/09/24 0810    phenylephrine HCl in 0.9% NaCl syringe  - Omnicell Override Pull, , , ,     sodium chloride (Ocean) 0.65 % nasal spray 1 spray, 1 spray, Each Nostril, 4x daily PRN, Osman Perkins MD    sodium phosphate 15 mmol in sodium chloride 0.9% 250 mL IV, 15 mmol, intravenous, Once, Mariluz Dailey MD  Prior to Admission medications    Medication Sig Start Date End Date Taking? Authorizing Provider   aspirin 81 mg EC tablet Take 1 tablet (81 mg) by mouth once daily. AS DIRECTED. 5/31/16  Yes Historical Provider, MD   blood sugar diagnostic (Blood Glucose Test) strip once daily. One Drop Test In Vitro Strip; Test   Yes Historical Provider, MD   chlorhexidine (Peridex) 0.12 % solution Use 15 mL in the mouth or throat see administration instructions for 2 doses. Use the night before and morning of surgery. 2/28/24  Yes RAQUEL Kellogg   dulaglutide (Trulicity) 4.5 mg/0.5 mL pen injector Inject 4.5 mg under the skin 1 (one) time per week. 9/15/23  Yes RAQUEL Gregory   dulaglutide (TRULICITY) 4.5 mg/0.5 mL pen injector INJECT ONE PEN (4.5 MG) UNDER THE SKIN ONCE WEEKLY 1/27/24 1/26/25 Yes RAQUEL Gregory   ergocalciferol (Vitamin D-2) 1.25 MG (75048 UT) capsule Take 1 capsule (50,000 Units) by mouth every 14 (fourteen) days. twice monthly on the 15th and the 30 th for vitamin d deficiency 10/11/23  Yes PHILLIP Gregory-CNP   fish oil concentrate (Omega-3) 120-180 mg capsule Take 1 capsule (1 g) by mouth.   Yes Historical Provider, MD   gabapentin  (Neurontin) 300 mg capsule Use the 100 mg capsule to titrate to 300 mg , then use the 300 mg capsule and take 1 to 2 capsules hs for pain 1/7/24  Yes Basilia Durbin MD   ibuprofen 800 mg tablet Take 1 tablet (800 mg) by mouth 3 times a day as needed for mild pain (1 - 3) (pain). 10/11/23 10/10/24 Yes RAQUEL Gregory   metFORMIN (Glucophage) 1,000 mg tablet TAKE 1 TABLET BY MOUTH EVERY MORNING AND 1.5 TABLETS BY MOUTH EVERY EVENING , 10/11/23  Yes RAQUEL Gregory   multivitamin tablet Take 1 tablet by mouth once daily.   Yes Historical Provider, MD   atorvastatin (Lipitor) 40 mg tablet Take 1 tablet (40 mg) by mouth once daily. 6/30/23 12/27/23  RAQUEL Gregory   NIFEdipine ER (NIFEdipine XL) 30 mg 24 hr tablet Take 1 tablet (30 mg) by mouth once daily in the morning. Take before meals. Do not crush, chew, or split.  Patient not taking: Reported on 3/5/2024 12/19/23 12/18/24  Chiki Carbajal MD   OneTouch Ultra Test strip Test blood sugar once daily  Patient not taking: Reported on 3/5/2024 10/11/23   RAQUEL Gregory   sildenafil (Viagra) 100 mg tablet Take 1 tablet (100 mg) by mouth once daily as needed (1 hour before needed).  Patient not taking: Reported on 3/5/2024 10/11/23   RAQUEL Gregory   tiZANidine (Zanaflex) 4 mg capsule Take 1 capsule (4 mg) by mouth 3 times a day.  Patient not taking: Reported on 3/5/2024 10/11/23 10/10/24  RAQUEL Gregory     No Known Allergies  Social History     Tobacco Use    Smoking status: Never    Smokeless tobacco: Never   Substance Use Topics    Alcohol use: Never            Encounter Date: 03/05/24   Electrocardiogram, 12-lead PRN ACS symptoms   Result Value    Ventricular Rate 150    Atrial Rate 144    QRS Duration 108    QT Interval 310    QTC Calculation(Bazett) 489    R Axis 8    T Axis -12    QRS Count 25    Q Onset 220    T Offset 375    QTC Fredericia 420    Narrative    Atrial fibrillation with rapid  ventricular response  Low voltage QRS  Nonspecific ST abnormality  Abnormal ECG  When compared with ECG of 20-MAR-2024 18:41,  Atrial fibrillation has replaced Sinus rhythm  Right bundle branch block is no longer Present  Criteria for Anterior infarct are no longer Present  Criteria for Anterolateral infarct are no longer Present  Criteria for Inferior infarct are no longer Present  Confirmed by Damien Choe ([id number]) on 3/27/2024 1:49:03 PM     No results found for this or any previous visit from the past 1095 days.     NPO Detail:  No data recorded     Physical Exam    Airway  Mallampati: unable to assess  Comments: Trach in place   Cardiovascular   Rhythm: regular  Rate: normal     Dental    Pulmonary    Abdominal          Vitals:    04/11/24 1100   BP: 124/62   Pulse: 90   Resp: (!) 36   Temp:    SpO2: 96%         Anesthesia Plan    History of general anesthesia?: yes  History of complications of general anesthesia?: no    ASA 4     general     The patient is not a current smoker.    intravenous induction   Postoperative administration of opioids is intended.  Anesthetic plan and risks discussed with spouse.  Use of blood products discussed with spouse who consented to blood products.    Plan discussed with attending.

## 2024-04-10 ENCOUNTER — APPOINTMENT (OUTPATIENT)
Dept: RADIOLOGY | Facility: HOSPITAL | Age: 64
DRG: 003 | End: 2024-04-10
Payer: COMMERCIAL

## 2024-04-10 ENCOUNTER — ANESTHESIA (OUTPATIENT)
Dept: OPERATING ROOM | Facility: HOSPITAL | Age: 64
DRG: 003 | End: 2024-04-10
Payer: COMMERCIAL

## 2024-04-10 ENCOUNTER — APPOINTMENT (OUTPATIENT)
Dept: CARDIOLOGY | Facility: HOSPITAL | Age: 64
DRG: 003 | End: 2024-04-10
Payer: COMMERCIAL

## 2024-04-10 LAB
ABO GROUP (TYPE) IN BLOOD: NORMAL
ALBUMIN SERPL BCP-MCNC: 2.5 G/DL (ref 3.4–5)
ALBUMIN SERPL BCP-MCNC: 2.6 G/DL (ref 3.4–5)
ALP SERPL-CCNC: 263 U/L (ref 33–136)
ALP SERPL-CCNC: 267 U/L (ref 33–136)
ALT SERPL W P-5'-P-CCNC: 12 U/L (ref 10–52)
ALT SERPL W P-5'-P-CCNC: 9 U/L (ref 10–52)
ANION GAP BLDA CALCULATED.4IONS-SCNC: 11 MMO/L (ref 10–25)
ANION GAP BLDA CALCULATED.4IONS-SCNC: 9 MMO/L (ref 10–25)
ANION GAP SERPL CALC-SCNC: 10 MMOL/L (ref 10–20)
ANTIBODY SCREEN: NORMAL
AST SERPL W P-5'-P-CCNC: 80 U/L (ref 9–39)
AST SERPL W P-5'-P-CCNC: 93 U/L (ref 9–39)
BASE EXCESS BLDA CALC-SCNC: -2.5 MMOL/L (ref -2–3)
BASE EXCESS BLDA CALC-SCNC: 1.6 MMOL/L (ref -2–3)
BILIRUB DIRECT SERPL-MCNC: 18.3 MG/DL (ref 0–0.3)
BILIRUB DIRECT SERPL-MCNC: 18.9 MG/DL (ref 0–0.3)
BILIRUB SERPL-MCNC: 28.4 MG/DL (ref 0–1.2)
BILIRUB SERPL-MCNC: 28.4 MG/DL (ref 0–1.2)
BODY TEMPERATURE: 37 DEGREES CELSIUS
BODY TEMPERATURE: 37 DEGREES CELSIUS
BUN SERPL-MCNC: 19 MG/DL (ref 6–23)
CA-I BLD-SCNC: 1.08 MMOL/L (ref 1.1–1.33)
CA-I BLDA-SCNC: 1.11 MMOL/L (ref 1.1–1.33)
CA-I BLDA-SCNC: 1.12 MMOL/L (ref 1.1–1.33)
CALCIUM SERPL-MCNC: 7.9 MG/DL (ref 8.6–10.6)
CHLORIDE BLDA-SCNC: 104 MMOL/L (ref 98–107)
CHLORIDE BLDA-SCNC: 99 MMOL/L (ref 98–107)
CHLORIDE SERPL-SCNC: 98 MMOL/L (ref 98–107)
CO2 SERPL-SCNC: 29 MMOL/L (ref 21–32)
CREAT SERPL-MCNC: 0.87 MG/DL (ref 0.5–1.3)
EGFRCR SERPLBLD CKD-EPI 2021: >90 ML/MIN/1.73M*2
EJECTION FRACTION APICAL 4 CHAMBER: 73.1
ERYTHROCYTE [DISTWIDTH] IN BLOOD BY AUTOMATED COUNT: 19.9 % (ref 11.5–14.5)
ERYTHROCYTE [DISTWIDTH] IN BLOOD BY AUTOMATED COUNT: 19.9 % (ref 11.5–14.5)
GLUCOSE BLD MANUAL STRIP-MCNC: 157 MG/DL (ref 74–99)
GLUCOSE BLD MANUAL STRIP-MCNC: 175 MG/DL (ref 74–99)
GLUCOSE BLD MANUAL STRIP-MCNC: 223 MG/DL (ref 74–99)
GLUCOSE BLD MANUAL STRIP-MCNC: 229 MG/DL (ref 74–99)
GLUCOSE BLDA-MCNC: 176 MG/DL (ref 74–99)
GLUCOSE BLDA-MCNC: 181 MG/DL (ref 74–99)
GLUCOSE SERPL-MCNC: 158 MG/DL (ref 74–99)
HCO3 BLDA-SCNC: 21.4 MMOL/L (ref 22–26)
HCO3 BLDA-SCNC: 25.8 MMOL/L (ref 22–26)
HCT VFR BLD AUTO: 26.7 % (ref 41–52)
HCT VFR BLD AUTO: 26.8 % (ref 41–52)
HCT VFR BLD EST: 30 % (ref 41–52)
HCT VFR BLD EST: 31 % (ref 41–52)
HGB BLD-MCNC: 9.6 G/DL (ref 13.5–17.5)
HGB BLD-MCNC: 9.7 G/DL (ref 13.5–17.5)
HGB BLDA-MCNC: 10 G/DL (ref 13.5–17.5)
HGB BLDA-MCNC: 10.2 G/DL (ref 13.5–17.5)
INHALED O2 CONCENTRATION: 40 %
INHALED O2 CONCENTRATION: 40 %
LACTATE BLDA-SCNC: 1.5 MMOL/L (ref 0.4–2)
LACTATE BLDA-SCNC: 1.6 MMOL/L (ref 0.4–2)
LV EJECTION FRACTION BIPLANE: 72 %
MAGNESIUM SERPL-MCNC: 1.85 MG/DL (ref 1.6–2.4)
MCH RBC QN AUTO: 28.6 PG (ref 26–34)
MCH RBC QN AUTO: 28.7 PG (ref 26–34)
MCHC RBC AUTO-ENTMCNC: 35.8 G/DL (ref 32–36)
MCHC RBC AUTO-ENTMCNC: 36.3 G/DL (ref 32–36)
MCV RBC AUTO: 79 FL (ref 80–100)
MCV RBC AUTO: 80 FL (ref 80–100)
MITRAL VALVE E/A RATIO: 1.16
MITRAL VALVE E/E' RATIO: 9.5
NRBC BLD-RTO: 0 /100 WBCS (ref 0–0)
NRBC BLD-RTO: 0.5 /100 WBCS (ref 0–0)
OXYHGB MFR BLDA: 95.2 % (ref 94–98)
OXYHGB MFR BLDA: 96.2 % (ref 94–98)
PCO2 BLDA: 33 MM HG (ref 38–42)
PCO2 BLDA: 38 MM HG (ref 38–42)
PH BLDA: 7.42 PH (ref 7.38–7.42)
PH BLDA: 7.44 PH (ref 7.38–7.42)
PHOSPHATE SERPL-MCNC: <1 MG/DL (ref 2.5–4.9)
PLATELET # BLD AUTO: 72 X10*3/UL (ref 150–450)
PLATELET # BLD AUTO: 73 X10*3/UL (ref 150–450)
PO2 BLDA: 131 MM HG (ref 85–95)
PO2 BLDA: 83 MM HG (ref 85–95)
POTASSIUM BLDA-SCNC: 3.2 MMOL/L (ref 3.5–5.3)
POTASSIUM BLDA-SCNC: 3.8 MMOL/L (ref 3.5–5.3)
POTASSIUM SERPL-SCNC: 3.3 MMOL/L (ref 3.5–5.3)
PROT SERPL-MCNC: 4.5 G/DL (ref 6.4–8.2)
PROT SERPL-MCNC: 4.6 G/DL (ref 6.4–8.2)
RBC # BLD AUTO: 3.35 X10*6/UL (ref 4.5–5.9)
RBC # BLD AUTO: 3.39 X10*6/UL (ref 4.5–5.9)
RH FACTOR (ANTIGEN D): NORMAL
RIGHT VENTRICLE FREE WALL PEAK S': 16.4 CM/S
SAO2 % BLDA: 99 % (ref 94–100)
SAO2 % BLDA: 99 % (ref 94–100)
SODIUM BLDA-SCNC: 131 MMOL/L (ref 136–145)
SODIUM BLDA-SCNC: 133 MMOL/L (ref 136–145)
SODIUM SERPL-SCNC: 134 MMOL/L (ref 136–145)
TRICUSPID ANNULAR PLANE SYSTOLIC EXCURSION: 3.3 CM
WBC # BLD AUTO: 5.5 X10*3/UL (ref 4.4–11.3)
WBC # BLD AUTO: 7.3 X10*3/UL (ref 4.4–11.3)

## 2024-04-10 PROCEDURE — 37799 UNLISTED PX VASCULAR SURGERY: CPT | Performed by: STUDENT IN AN ORGANIZED HEALTH CARE EDUCATION/TRAINING PROGRAM

## 2024-04-10 PROCEDURE — 93325 DOPPLER ECHO COLOR FLOW MAPG: CPT | Performed by: INTERNAL MEDICINE

## 2024-04-10 PROCEDURE — 99233 SBSQ HOSP IP/OBS HIGH 50: CPT | Performed by: STUDENT IN AN ORGANIZED HEALTH CARE EDUCATION/TRAINING PROGRAM

## 2024-04-10 PROCEDURE — 99291 CRITICAL CARE FIRST HOUR: CPT | Performed by: STUDENT IN AN ORGANIZED HEALTH CARE EDUCATION/TRAINING PROGRAM

## 2024-04-10 PROCEDURE — 37799 UNLISTED PX VASCULAR SURGERY: CPT

## 2024-04-10 PROCEDURE — 93321 DOPPLER ECHO F-UP/LMTD STD: CPT | Performed by: INTERNAL MEDICINE

## 2024-04-10 PROCEDURE — 51701 INSERT BLADDER CATHETER: CPT

## 2024-04-10 PROCEDURE — 2500000004 HC RX 250 GENERAL PHARMACY W/ HCPCS (ALT 636 FOR OP/ED): Performed by: STUDENT IN AN ORGANIZED HEALTH CARE EDUCATION/TRAINING PROGRAM

## 2024-04-10 PROCEDURE — 2500000004 HC RX 250 GENERAL PHARMACY W/ HCPCS (ALT 636 FOR OP/ED): Performed by: PHYSICIAN ASSISTANT

## 2024-04-10 PROCEDURE — 71045 X-RAY EXAM CHEST 1 VIEW: CPT | Performed by: RADIOLOGY

## 2024-04-10 PROCEDURE — 2500000001 HC RX 250 WO HCPCS SELF ADMINISTERED DRUGS (ALT 637 FOR MEDICARE OP): Performed by: STUDENT IN AN ORGANIZED HEALTH CARE EDUCATION/TRAINING PROGRAM

## 2024-04-10 PROCEDURE — 2500000004 HC RX 250 GENERAL PHARMACY W/ HCPCS (ALT 636 FOR OP/ED)

## 2024-04-10 PROCEDURE — 86901 BLOOD TYPING SEROLOGIC RH(D): CPT | Performed by: STUDENT IN AN ORGANIZED HEALTH CARE EDUCATION/TRAINING PROGRAM

## 2024-04-10 PROCEDURE — 2500000001 HC RX 250 WO HCPCS SELF ADMINISTERED DRUGS (ALT 637 FOR MEDICARE OP): Performed by: NURSE PRACTITIONER

## 2024-04-10 PROCEDURE — 99232 SBSQ HOSP IP/OBS MODERATE 35: CPT

## 2024-04-10 PROCEDURE — 94003 VENT MGMT INPAT SUBQ DAY: CPT

## 2024-04-10 PROCEDURE — C9113 INJ PANTOPRAZOLE SODIUM, VIA: HCPCS | Performed by: PHYSICIAN ASSISTANT

## 2024-04-10 PROCEDURE — 93325 DOPPLER ECHO COLOR FLOW MAPG: CPT

## 2024-04-10 PROCEDURE — 2500000005 HC RX 250 GENERAL PHARMACY W/O HCPCS: Performed by: STUDENT IN AN ORGANIZED HEALTH CARE EDUCATION/TRAINING PROGRAM

## 2024-04-10 PROCEDURE — 83735 ASSAY OF MAGNESIUM: CPT

## 2024-04-10 PROCEDURE — 82330 ASSAY OF CALCIUM: CPT | Performed by: STUDENT IN AN ORGANIZED HEALTH CARE EDUCATION/TRAINING PROGRAM

## 2024-04-10 PROCEDURE — 82947 ASSAY GLUCOSE BLOOD QUANT: CPT

## 2024-04-10 PROCEDURE — 93308 TTE F-UP OR LMTD: CPT | Performed by: INTERNAL MEDICINE

## 2024-04-10 PROCEDURE — 71045 X-RAY EXAM CHEST 1 VIEW: CPT

## 2024-04-10 PROCEDURE — 85027 COMPLETE CBC AUTOMATED: CPT

## 2024-04-10 PROCEDURE — 84132 ASSAY OF SERUM POTASSIUM: CPT

## 2024-04-10 PROCEDURE — 2500000005 HC RX 250 GENERAL PHARMACY W/O HCPCS

## 2024-04-10 PROCEDURE — 84075 ASSAY ALKALINE PHOSPHATASE: CPT

## 2024-04-10 PROCEDURE — 84100 ASSAY OF PHOSPHORUS: CPT

## 2024-04-10 PROCEDURE — 2020000001 HC ICU ROOM DAILY

## 2024-04-10 PROCEDURE — 90937 HEMODIALYSIS REPEATED EVAL: CPT

## 2024-04-10 RX ADMIN — NOREPINEPHRINE BITARTRATE 0.03 MCG/KG/MIN: 8 INJECTION, SOLUTION INTRAVENOUS at 16:20

## 2024-04-10 RX ADMIN — HYDROCORTISONE SODIUM SUCCINATE 50 MG: 100 INJECTION, POWDER, FOR SOLUTION INTRAMUSCULAR; INTRAVENOUS at 22:46

## 2024-04-10 RX ADMIN — INSULIN LISPRO 4 UNITS: 100 INJECTION, SOLUTION INTRAVENOUS; SUBCUTANEOUS at 04:44

## 2024-04-10 RX ADMIN — INSULIN LISPRO 4 UNITS: 100 INJECTION, SOLUTION INTRAVENOUS; SUBCUTANEOUS at 16:23

## 2024-04-10 RX ADMIN — INSULIN LISPRO 4 UNITS: 100 INJECTION, SOLUTION INTRAVENOUS; SUBCUTANEOUS at 12:28

## 2024-04-10 RX ADMIN — MIDODRINE HYDROCHLORIDE 20 MG: 10 TABLET ORAL at 17:47

## 2024-04-10 RX ADMIN — HYDROCORTISONE SODIUM SUCCINATE 50 MG: 100 INJECTION, POWDER, FOR SOLUTION INTRAMUSCULAR; INTRAVENOUS at 16:20

## 2024-04-10 RX ADMIN — Medication 40 PERCENT: at 08:00

## 2024-04-10 RX ADMIN — HYDROCORTISONE SODIUM SUCCINATE 25 MG: 100 INJECTION, POWDER, FOR SOLUTION INTRAMUSCULAR; INTRAVENOUS at 09:47

## 2024-04-10 RX ADMIN — FLUDROCORTISONE ACETATE 0.1 MG: 0.1 TABLET ORAL at 12:28

## 2024-04-10 RX ADMIN — MIDODRINE HYDROCHLORIDE 20 MG: 10 TABLET ORAL at 09:41

## 2024-04-10 RX ADMIN — HEPARIN SODIUM 5000 UNITS: 5000 INJECTION INTRAVENOUS; SUBCUTANEOUS at 04:29

## 2024-04-10 RX ADMIN — Medication 1 G: at 21:38

## 2024-04-10 RX ADMIN — MIDODRINE HYDROCHLORIDE 20 MG: 10 TABLET ORAL at 03:15

## 2024-04-10 RX ADMIN — HEPARIN SODIUM 5000 UNITS: 5000 INJECTION INTRAVENOUS; SUBCUTANEOUS at 11:39

## 2024-04-10 RX ADMIN — PERFLUTREN 10 ML OF DILUTION: 6.52 INJECTION, SUSPENSION INTRAVENOUS at 16:54

## 2024-04-10 RX ADMIN — Medication 1 G: at 09:15

## 2024-04-10 RX ADMIN — HEPARIN SODIUM 5000 UNITS: 5000 INJECTION INTRAVENOUS; SUBCUTANEOUS at 20:47

## 2024-04-10 RX ADMIN — FLUDROCORTISONE ACETATE 0.1 MG: 0.1 TABLET ORAL at 02:00

## 2024-04-10 RX ADMIN — INSULIN LISPRO 8 UNITS: 100 INJECTION, SOLUTION INTRAVENOUS; SUBCUTANEOUS at 20:48

## 2024-04-10 RX ADMIN — POTASSIUM PHOSPHATE, MONOBASIC AND POTASSIUM PHOSPHATE, DIBASIC 30 MMOL: 224; 236 INJECTION, SOLUTION, CONCENTRATE INTRAVENOUS at 17:46

## 2024-04-10 RX ADMIN — PANTOPRAZOLE SODIUM 40 MG: 40 INJECTION, POWDER, FOR SOLUTION INTRAVENOUS at 09:41

## 2024-04-10 ASSESSMENT — COGNITIVE AND FUNCTIONAL STATUS - GENERAL
DRESSING REGULAR LOWER BODY CLOTHING: TOTAL
DAILY ACTIVITIY SCORE: 6
MOVING FROM LYING ON BACK TO SITTING ON SIDE OF FLAT BED WITH BEDRAILS: TOTAL
PERSONAL GROOMING: TOTAL
WALKING IN HOSPITAL ROOM: TOTAL
CLIMB 3 TO 5 STEPS WITH RAILING: TOTAL
HELP NEEDED FOR BATHING: TOTAL
MOBILITY SCORE: 6
DRESSING REGULAR UPPER BODY CLOTHING: TOTAL
STANDING UP FROM CHAIR USING ARMS: TOTAL
TOILETING: TOTAL
TURNING FROM BACK TO SIDE WHILE IN FLAT BAD: TOTAL
MOVING TO AND FROM BED TO CHAIR: TOTAL
EATING MEALS: TOTAL

## 2024-04-10 ASSESSMENT — PAIN SCALES - GENERAL
PAINLEVEL_OUTOF10: 0 - NO PAIN

## 2024-04-10 ASSESSMENT — PAIN - FUNCTIONAL ASSESSMENT
PAIN_FUNCTIONAL_ASSESSMENT: CPOT (CRITICAL CARE PAIN OBSERVATION TOOL)
PAIN_FUNCTIONAL_ASSESSMENT: 0-10
PAIN_FUNCTIONAL_ASSESSMENT: 0-10
PAIN_FUNCTIONAL_ASSESSMENT: CPOT (CRITICAL CARE PAIN OBSERVATION TOOL)
PAIN_FUNCTIONAL_ASSESSMENT: 0-10
PAIN_FUNCTIONAL_ASSESSMENT: 0-10

## 2024-04-10 ASSESSMENT — ENCOUNTER SYMPTOMS
HEMOPTYSIS: 1
FEVER: 1
COUGH: 1

## 2024-04-10 NOTE — SIGNIFICANT EVENT
The trach sutures were cut and the trach placement was checked. No concerns with tracheostoma on examination.      Please call back if there is any issues regarding the trach or if the trach needs to be changed. ENT will continue to follow weekly.     Standard trach care:  Cleanse the tracheostomy site with 1/2 hydrogen peroxide & 1/2 normal saline to remove crust twice daily and as needed. Remove and cleanse the inner cannula with 1/2 hydrogen peroxide & 1/2 normal saline to remove mucus build up and rinse well with normal saline twice daily and as needed. Trach ties can be changed as needed.     ENT follow up to be arranged    Dennis Katz, DO PGY-2  Otolaryngology - Head & Neck Surgery  [facility]    ENT Consult pager: [phone]  Please Page if Urgent

## 2024-04-10 NOTE — PROGRESS NOTES
Jason Hollins is a 63 y.o. male on day 36 of admission presenting with Soft tissue sarcoma (CMS/HCC).      Subjective   4/10: S/p SLED overnight. Pt seen resting in bed. He is lethargic but able to follow commands. Nephew at bedside. Trach to vent FiO2 40%. No urine output - bladder scan showed 409mL but only 50mL came out with straight cath.  On some levo 0.03.       Objective          Vitals 24HR  Heart Rate:  []   Temp:  [36.6 °C (97.9 °F)-37.2 °C (99 °F)]   Resp:  [18-38]   Weight:  [125 kg (275 lb 9.2 oz)]   SpO2:  [93 %-100 %]         Intake/Output last 3 Shifts:    Intake/Output Summary (Last 24 hours) at 4/10/2024 1603  Last data filed at 4/10/2024 1500  Gross per 24 hour   Intake 1491.39 ml   Output 1305 ml   Net 186.39 ml       Physical Exam  General appearance: intubated, trached  Heart: RRR  Lungs: FiO2 40% on vent via trach  : no Marino  Neuro: alert, follows commands  ACCESS:  right I J trialysis    Current Facility-Administered Medications:     alteplase (Cathflo Activase) injection 2 mg, 2 mg, intra-catheter, PRN, Gracie Skaggs, APRN-CNP    calcium gluconate in NS IV 1 g, 1 g, intravenous, q6h PRN, Gracie C Uljanic, APRN-CNP, Stopped at 04/08/24 0335    calcium gluconate in NS IV 2 g, 2 g, intravenous, q6h PRN, Gracie C Uljanic, APRN-CNP, Last Rate: 100 mL/hr at 04/04/24 1118, 2 g at 04/04/24 1118    dextrose 50 % injection 25 g, 25 g, intravenous, q15 min PRN **OR** glucagon (Glucagen) injection 1 mg, 1 mg, intramuscular, q15 min PRN, Gracie C Uljanic, APRN-CNP    fludrocortisone (Florinef) tablet 0.1 mg, 0.1 mg, nasogastric tube, q12h, Gracie Lozadaic, APRN-CNP, 0.1 mg at 04/10/24 1228    heparin (porcine) injection 5,000 Units, 5,000 Units, subcutaneous, q8h, Mariluz Dailey MD, 5,000 Units at 04/10/24 1139    hydrocortisone sod succ (PF) (Solu-CORTEF) injection 50 mg, 50 mg, intravenous, q6h, Danilo Torres MD    insulin lispro (HumaLOG) injection 0-20 Units,  0-20 Units, subcutaneous, q4h, PHILLIP Bernal-CNP, 4 Units at 04/10/24 1228    lactated Ringer's infusion, 5 mL/hr, intravenous, Continuous, Gordon Nieves MD, Last Rate: 5 mL/hr at 04/10/24 1500, 5 mL/hr at 04/10/24 1500    magnesium sulfate IV 2 g, 2 g, intravenous, q6h PRN, Gracie Skaggs APRN-CNP, Stopped at 03/24/24 1726    magnesium sulfate IV 4 g, 4 g, intravenous, q6h PRN, Gracie Skaggs APRN-CNP, Stopped at 04/05/24 0838    meropenem (Merrem) in sodium chloride 0.9 % 100 mL IV 1 g, 1 g, intravenous, q8h, Mariluz Dailey MD, Stopped at 04/10/24 0945    midodrine (Proamatine) tablet 20 mg, 20 mg, orogastric tube, q8h, Mariluz Dailey MD, 20 mg at 04/10/24 0941    norepinephrine (Levophed) 8 mg in dextrose 5% 250 mL (0.032 mg/mL) infusion (premix), 0.01-0.5 mcg/kg/min (Dosing Weight), intravenous, Continuous, Mariluz Dailey MD, Last Rate: 5.68 mL/hr at 04/10/24 1100, 0.03 mcg/kg/min at 04/10/24 1100    oxygen (O2) therapy, , inhalation, Continuous - Inhalation, Mariluz Dailey MD, Rate Verify at 04/10/24 0353    pantoprazole (ProtoNix) injection 40 mg, 40 mg, intravenous, Daily, Alesha Mckeon PA-C, 40 mg at 04/10/24 0941    perflutren lipid microspheres (Definity) injection 0.5-10 mL of dilution, 0.5-10 mL of dilution, intravenous, Once in imaging, Danilo Torres MD    potassium phosphates 30 mmol in dextrose 5 % in water (D5W) 250 mL IV, 30 mmol, intravenous, Once, RAQUEL Alejo    sodium chloride (Ocean) 0.65 % nasal spray 1 spray, 1 spray, Each Nostril, 4x daily PRN, Osman Perkins MD      Assessment/Plan   Jason Hollins is a 63 y.o. 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 Hawkins and Dr. Smith on 3/5/24. On 3/20, he developed massive PE and was given TPA, taken to  OR in setting of increased swelling at flap site- hematoma for exploration and evacuation. Nephrology following for RUTH.    #Acute Kidney Injury on Dialysis (RUTH-D), anuric  - etiology hemodynamic from hemorrhagic shock  - began on CVVH since 3/21 via R I J trialysis and transitioned to SLED on 4/2  - Cr baseline 0.6  - electrolytes: checked at 1400 - electrolytes look strange so may have been drawn close to SLED termination  - no urine output in 24h; Bladder scan 400mL but only 50mL after straight cath; I/O: +0.7L  - FiO2 40% on vent - trach  - on levophed 0.03  - s/p SLED 4/7-4/8 - was hypotensive  - s/p SLED 4/9-4/10 with 2L fluid removed    Myxoid Chondrosarcoma s/p wide resection 3/5/2024  Thrombocytopenia  - plan for 4/11 for thigh debridement    Plan  - s/p SLED overnight, no need for further dialysis today  - do not check BMP during SLED as electrolytes will not be accurate; instead, check BMP >4h after SLED conclusion  - continue bladder scan once per shift to monitor for renal recovery  - when more hemodynamically stable will eval for transition to iHD    D/w Dr. Yolanda Duffy MD  Nephrology Fellow PGY 5  Contact via secure chat  Nephrology Pager # [phone] ([phone])

## 2024-04-10 NOTE — PROGRESS NOTES
"    Department of Plastic and Reconstructive Surgery  Daily Progress Note    Patient Name: Jason Hollins  MRN: [id number]  Date:  04/10/24     Subjective  Remains critically ill trach to ventilator support in CTICU. Responsive to commands and shakes head appropriately. Incisional wound vac replaced at bedside this AM without issue. Nephew at bedside aware for tentative plans to RTOR tomorrow 4/11 for right posterior thigh wound debridement, pending clinical stability.     Overnight Events  NAOE    Objective    Vital Signs  BP (!) 125/44   Pulse 75   Temp 37.2 °C (99 °F) (Temporal)   Resp 18   Ht 1.778 m (5' 10\")   Wt 125 kg (275 lb 9.2 oz)   SpO2 98%   BMI 39.54 kg/m²      Physical Exam   Constitutional: Alert, lying in pressure relieving bed in ICU, appears critically ill.   ENMT: MMM, dobhoff in R nare.  Cardiovascular: RRR on telemetry monitor.  Respiratory/Thorax: Trach to vent.  Gastrointestinal: Abdomen soft, protuberant.   GI: [facility] currently running  Musculoskeletal: Able to squeeze bilateral hands, wiggles toes on the left, appears to have effort to move RLE  Extremities: Generalized pitting edema. Right thigh edematous, but soft and compressible, no bruising or tissue induration noted. Incisional wound vac removed at bedside. Posterior portion of flap macerated, with ecchymotic/necrotic incisions well approximated. Incisions well approximated, no dehiscence, ANDERSON drain x 3 with dark serous output. Incisional vac replaced maintaining low continuous suction at -75mmHg, no alarms for leak, obstruction or malfunction, expectantly no output in collecting canister.   Neurological:  Aware, alert and able to respond with appropriate head nods, follows commands  Skin: Edematous, jaundice, warm    Diagnostics   Results for orders placed or performed during the hospital encounter of 03/05/24 (from the past 24 hour(s))   CBC   Result Value Ref Range    WBC 6.5 4.4 - 11.3 x10*3/uL    nRBC 0.3 (H) 0.0 - 0.0 " /100 WBCs    RBC 3.54 (L) 4.50 - 5.90 x10*6/uL    Hemoglobin 10.1 (L) 13.5 - 17.5 g/dL    Hematocrit 27.9 (L) 41.0 - 52.0 %    MCV 79 (L) 80 - 100 fL    MCH 28.5 26.0 - 34.0 pg    MCHC 36.2 (H) 32.0 - 36.0 g/dL    RDW 18.9 (H) 11.5 - 14.5 %    Platelets 66 (L) 150 - 450 x10*3/uL   C. difficile, PCR    Specimen: Stool   Result Value Ref Range    C. difficile, PCR Not Detected Not Detected   Blood Gas Arterial Full Panel Unsolicited   Result Value Ref Range    POCT pH, Arterial 7.42 7.38 - 7.42 pH    POCT pCO2, Arterial 34 (L) 38 - 42 mm Hg    POCT pO2, Arterial 78 (L) 85 - 95 mm Hg    POCT SO2, Arterial 98 94 - 100 %    POCT Oxy Hemoglobin, Arterial 95.7 94.0 - 98.0 %    POCT Hematocrit Calculated, Arterial 31.0 (L) 41.0 - 52.0 %    POCT Sodium, Arterial 132 (L) 136 - 145 mmol/L    POCT Potassium, Arterial 4.1 3.5 - 5.3 mmol/L    POCT Chloride, Arterial 102 98 - 107 mmol/L    POCT Ionized Calcium, Arterial 1.13 1.10 - 1.33 mmol/L    POCT Glucose, Arterial 157 (H) 74 - 99 mg/dL    POCT Lactate, Arterial 1.8 0.4 - 2.0 mmol/L    POCT Base Excess, Arterial -1.9 -2.0 - 3.0 mmol/L    POCT HCO3 Calculated, Arterial 22.1 22.0 - 26.0 mmol/L    POCT Hemoglobin, Arterial 10.2 (L) 13.5 - 17.5 g/dL    POCT Anion Gap, Arterial 12 10 - 25 mmo/L    Patient Temperature 37.0 degrees Celsius   POCT GLUCOSE   Result Value Ref Range    POCT Glucose 173 (H) 74 - 99 mg/dL   POCT GLUCOSE   Result Value Ref Range    POCT Glucose 151 (H) 74 - 99 mg/dL   Blood Gas Arterial Full Panel   Result Value Ref Range    POCT pH, Arterial 7.42 7.38 - 7.42 pH    POCT pCO2, Arterial 33 (L) 38 - 42 mm Hg    POCT pO2, Arterial 131 (H) 85 - 95 mm Hg    POCT SO2, Arterial 99 94 - 100 %    POCT Oxy Hemoglobin, Arterial 96.2 94.0 - 98.0 %    POCT Hematocrit Calculated, Arterial 31.0 (L) 41.0 - 52.0 %    POCT Sodium, Arterial 133 (L) 136 - 145 mmol/L    POCT Potassium, Arterial 3.8 3.5 - 5.3 mmol/L    POCT Chloride, Arterial 104 98 - 107 mmol/L    POCT Ionized  Calcium, Arterial 1.11 1.10 - 1.33 mmol/L    POCT Glucose, Arterial 181 (H) 74 - 99 mg/dL    POCT Lactate, Arterial 1.6 0.4 - 2.0 mmol/L    POCT Base Excess, Arterial -2.5 (L) -2.0 - 3.0 mmol/L    POCT HCO3 Calculated, Arterial 21.4 (L) 22.0 - 26.0 mmol/L    POCT Hemoglobin, Arterial 10.2 (L) 13.5 - 17.5 g/dL    POCT Anion Gap, Arterial 11 10 - 25 mmo/L    Patient Temperature 37.0 degrees Celsius    FiO2 40 %   CBC   Result Value Ref Range    WBC 5.5 4.4 - 11.3 x10*3/uL    nRBC 0.5 (H) 0.0 - 0.0 /100 WBCs    RBC 3.39 (L) 4.50 - 5.90 x10*6/uL    Hemoglobin 9.7 (L) 13.5 - 17.5 g/dL    Hematocrit 26.7 (L) 41.0 - 52.0 %    MCV 79 (L) 80 - 100 fL    MCH 28.6 26.0 - 34.0 pg    MCHC 36.3 (H) 32.0 - 36.0 g/dL    RDW 19.9 (H) 11.5 - 14.5 %    Platelets 73 (L) 150 - 450 x10*3/uL   Hepatic function panel   Result Value Ref Range    Albumin 2.6 (L) 3.4 - 5.0 g/dL    Bilirubin, Total 28.4 (H) 0.0 - 1.2 mg/dL    Bilirubin, Direct 18.9 (H) 0.0 - 0.3 mg/dL    Alkaline Phosphatase 267 (H) 33 - 136 U/L    ALT 12 10 - 52 U/L    AST 93 (H) 9 - 39 U/L    Total Protein 4.5 (L) 6.4 - 8.2 g/dL       Current Medications  Scheduled medications  fludrocortisone, 0.1 mg, nasogastric tube, q12h  heparin (porcine), 5,000 Units, subcutaneous, q8h  hydrocortisone sodium succinate, 50 mg, intravenous, q6h  insulin lispro, 0-20 Units, subcutaneous, q4h  meropenem, 1 g, intravenous, q8h  midodrine, 20 mg, orogastric tube, q8h  oxygen, , inhalation, Continuous - Inhalation  pantoprazole, 40 mg, intravenous, Daily      Continuous medications  lactated Ringer's, 5 mL/hr, Last Rate: 5 mL/hr (04/09/24 1900)  norepinephrine, 0.01-0.5 mcg/kg/min (Dosing Weight), Last Rate: 0.02 mcg/kg/min (04/10/24 0800)      PRN medications  PRN medications: alteplase, calcium gluconate, calcium gluconate, dextrose **OR** glucagon, magnesium sulfate, magnesium sulfate, sodium chloride     Assessment  Jason Hollins 63 y.o. male with PMH of DM2, HLD, myxoid chondrosarcoma s/p  wide resection sarcoma, sciatic nerve neurolysis of right lower extremity with right gluteal reconstruction, pedicle ALT, vastus lateralus flap, pedicle gluteal and perisformis flap with Dr. Hawkins and Dr. Smith on 3/5/24. Postoperative course c/b arrest requiring CPR with ROSC after TPA for assumed large PE on 3/20. Increased swelling at flap site appreciated overnight 3/20-3/21 and pt returned to OR for exploration of R flap site, evacuation of hematoma, suture ligation of multiple bleeding vessel sites in gluteal area and wound closure with Dr. Smith on 3/21. Pt has remained in ICU for continued critical care evaluation and management postoperatively.     Plan/Recommendations  - Tentatively plan for OR for Thursday 4/11 as clinical stability permits for R posterior thigh tissue I&D with possible tissue advancement and complex closure        ·   Appreciate updated documentation of clearance for RTOR per ICU care team prior to surgery        ·   Please ensure TF are held at MN 4/11 in prep for OR       ·   T&S updated as of 4/8  - Maintain incisional wound vac to R posterior thigh wound site per plastic surgery         ·  Settings: -75 mmHg low continuous suction        · Dressing changed at bedside AM 4/10, timing of next dressing change/removal pending likely surgical intervention tomorrow 4/11       ·  Monitor/record vac canister output [id number]       ·  Notify plastic surgery with any concerns of leak or obstruction  - Monitor right thigh for s/s of bleeding recurrence or worsening progression, has been stable on serial assessments over past week   - Continue ANDERSON drain care to x3 drains present at R thigh flap reconstruction site      ·  Strip drain tubing TID and PRN     ·  Monitor and record output q8h      ·  Keep drain sites C/D/I      ·  Notify plastics if ANDERSON drain output exceeds > 100 ml/hr in one drain or > 300 ml/hr collectively  - Avoid pressure application or positioning onto flap site or incisions  at R upper leg   - Recommend patient be positioned off of R side as able to prevent flap compression/wound breakdown   - Continue clinitron fluidized air mattress  - Appreciate remaining supportive care per ICU   - Plastic Surgery will continue to follow      Patient and plan discussed with Dr. Smith.     PHILLIP Dalal-CNP   Plastic and Reconstructive Surgery   [place]  Pager #[phone]  Team phones: [phone]

## 2024-04-10 NOTE — PROGRESS NOTES
CTICU    DC PLAN  LTACH: [facility] 10m / [facility] ([facility]) 13m / [facility] ([facility]) 16m / [facility] (17m)     > auth needed    PAYOR  [employer]     AWAITING  ( ) AOC from wife - expectation is to check in next week    COMPLETED  (X) Daily ongoing review of patient via chart and/or (M-F) IDT rounds  (X) Intermittent uploading of clinicals to referral database - last upload 4/10  (X) 04/10 - Met with wife & patient to discuss LTACH planning & LTACH list given for [facility]        Chantelle Ramirez (LSW, MSW)

## 2024-04-10 NOTE — PROGRESS NOTES
"Jason Hollins is a 63 y.o. male on day 36 of admission presenting with Soft tissue sarcoma (CMS/HCC).    Subjective   SLED overnight  Required NE up to 0.03       Objective     Physical Exam  Constitutional:       Appearance: Normal appearance.   Eyes:      Extraocular Movements: Extraocular movements intact.   Neck:      Comments: Trach site is clean, dry, intact without surrounding swelling or bleeding  Cardiovascular:      Rate and Rhythm: Normal rate and regular rhythm.      Heart sounds: No murmur heard.  Pulmonary:      Comments: Respiratory crackles and diminished breath sounds auscultated bilaterally  Abdominal:      General: Abdomen is flat. Bowel sounds are normal. There is no distension.      Palpations: Abdomen is soft.   Musculoskeletal:      Comments: 2+ pitting edema noted bilaterally lower extremities (consistent with prior exam)   Skin:     General: Skin is warm.   Neurological:      Mental Status: He is alert.      Comments: Alert, responsive to verbal commands including squeezing hands bilaterally, moving left foot to command.  Patient does not move right upper extremity and (consistent with prior exams)         Last Recorded Vitals  Blood pressure (!) 125/44, pulse 100, temperature 37.2 °C (99 °F), temperature source Temporal, resp. rate (!) 32, height 1.778 m (5' 10\"), weight 125 kg (275 lb 9.2 oz), SpO2 97%.  Intake/Output last 3 Shifts:  I/O last 3 completed shifts:  In: 2353.9 (18.8 mL/kg) [I.V.:308.9 (2.5 mL/kg); Blood:350; NG/GT:1145; IV Piggyback:550]  Out: 1505 (12 mL/kg) [Urine:50 (0 mL/kg/hr); Drains:765; Other:690]  Weight: 125 kg     Relevant Results  Scheduled medications  fludrocortisone, 0.1 mg, nasogastric tube, q12h  heparin (porcine), 5,000 Units, subcutaneous, q8h  hydrocortisone sodium succinate, 50 mg, intravenous, q6h  insulin lispro, 0-20 Units, subcutaneous, q4h  meropenem, 1 g, intravenous, q8h  midodrine, 20 mg, orogastric tube, q8h  oxygen, , inhalation, Continuous - " Inhalation  pantoprazole, 40 mg, intravenous, Daily  perflutren lipid microspheres, 0.5-10 mL of dilution, intravenous, Once in imaging  perflutren protein A microsphere, 0.5 mL, intravenous, Once in imaging  sulfur hexafluoride microsphr, 2 mL, intravenous, Once in imaging      Continuous medications  lactated Ringer's, 5 mL/hr, Last Rate: 5 mL/hr (04/09/24 1900)  norepinephrine, 0.01-0.5 mcg/kg/min (Dosing Weight), Last Rate: 0.02 mcg/kg/min (04/10/24 0800)      PRN medications  PRN medications: alteplase, calcium gluconate, calcium gluconate, dextrose **OR** glucagon, magnesium sulfate, magnesium sulfate, sodium chloride  Results for orders placed or performed during the hospital encounter of 03/05/24 (from the past 24 hour(s))   C. difficile, PCR    Specimen: Stool   Result Value Ref Range    C. difficile, PCR Not Detected Not Detected   Blood Gas Arterial Full Panel Unsolicited   Result Value Ref Range    POCT pH, Arterial 7.42 7.38 - 7.42 pH    POCT pCO2, Arterial 34 (L) 38 - 42 mm Hg    POCT pO2, Arterial 78 (L) 85 - 95 mm Hg    POCT SO2, Arterial 98 94 - 100 %    POCT Oxy Hemoglobin, Arterial 95.7 94.0 - 98.0 %    POCT Hematocrit Calculated, Arterial 31.0 (L) 41.0 - 52.0 %    POCT Sodium, Arterial 132 (L) 136 - 145 mmol/L    POCT Potassium, Arterial 4.1 3.5 - 5.3 mmol/L    POCT Chloride, Arterial 102 98 - 107 mmol/L    POCT Ionized Calcium, Arterial 1.13 1.10 - 1.33 mmol/L    POCT Glucose, Arterial 157 (H) 74 - 99 mg/dL    POCT Lactate, Arterial 1.8 0.4 - 2.0 mmol/L    POCT Base Excess, Arterial -1.9 -2.0 - 3.0 mmol/L    POCT HCO3 Calculated, Arterial 22.1 22.0 - 26.0 mmol/L    POCT Hemoglobin, Arterial 10.2 (L) 13.5 - 17.5 g/dL    POCT Anion Gap, Arterial 12 10 - 25 mmo/L    Patient Temperature 37.0 degrees Celsius   POCT GLUCOSE   Result Value Ref Range    POCT Glucose 173 (H) 74 - 99 mg/dL   POCT GLUCOSE   Result Value Ref Range    POCT Glucose 151 (H) 74 - 99 mg/dL   Blood Gas Arterial Full Panel    Result Value Ref Range    POCT pH, Arterial 7.42 7.38 - 7.42 pH    POCT pCO2, Arterial 33 (L) 38 - 42 mm Hg    POCT pO2, Arterial 131 (H) 85 - 95 mm Hg    POCT SO2, Arterial 99 94 - 100 %    POCT Oxy Hemoglobin, Arterial 96.2 94.0 - 98.0 %    POCT Hematocrit Calculated, Arterial 31.0 (L) 41.0 - 52.0 %    POCT Sodium, Arterial 133 (L) 136 - 145 mmol/L    POCT Potassium, Arterial 3.8 3.5 - 5.3 mmol/L    POCT Chloride, Arterial 104 98 - 107 mmol/L    POCT Ionized Calcium, Arterial 1.11 1.10 - 1.33 mmol/L    POCT Glucose, Arterial 181 (H) 74 - 99 mg/dL    POCT Lactate, Arterial 1.6 0.4 - 2.0 mmol/L    POCT Base Excess, Arterial -2.5 (L) -2.0 - 3.0 mmol/L    POCT HCO3 Calculated, Arterial 21.4 (L) 22.0 - 26.0 mmol/L    POCT Hemoglobin, Arterial 10.2 (L) 13.5 - 17.5 g/dL    POCT Anion Gap, Arterial 11 10 - 25 mmo/L    Patient Temperature 37.0 degrees Celsius    FiO2 40 %   CBC   Result Value Ref Range    WBC 5.5 4.4 - 11.3 x10*3/uL    nRBC 0.5 (H) 0.0 - 0.0 /100 WBCs    RBC 3.39 (L) 4.50 - 5.90 x10*6/uL    Hemoglobin 9.7 (L) 13.5 - 17.5 g/dL    Hematocrit 26.7 (L) 41.0 - 52.0 %    MCV 79 (L) 80 - 100 fL    MCH 28.6 26.0 - 34.0 pg    MCHC 36.3 (H) 32.0 - 36.0 g/dL    RDW 19.9 (H) 11.5 - 14.5 %    Platelets 73 (L) 150 - 450 x10*3/uL   Hepatic function panel   Result Value Ref Range    Albumin 2.6 (L) 3.4 - 5.0 g/dL    Bilirubin, Total 28.4 (H) 0.0 - 1.2 mg/dL    Bilirubin, Direct 18.9 (H) 0.0 - 0.3 mg/dL    Alkaline Phosphatase 267 (H) 33 - 136 U/L    ALT 12 10 - 52 U/L    AST 93 (H) 9 - 39 U/L    Total Protein 4.5 (L) 6.4 - 8.2 g/dL   POCT GLUCOSE   Result Value Ref Range    POCT Glucose 175 (H) 74 - 99 mg/dL                    Assessment/Plan   Principal Problem:    Soft tissue sarcoma (CMS/HCC)  Active Problems:    Acute kidney injury (nontraumatic) (CMS/HCC)    Pulmonary embolus (CMS/HCC)    Anemia    Thrombocytopenia (CMS/HCC)    Extraskeletal myxoid chondrosarcoma (CMS/HCC)    Cardiopulmonary arrest (CMS/HCC)     Acute respiratory failure with hypoxia (CMS/HCC)    Tracheostomy hemorrhage (CMS/HCC)    Postoperative wound breakdown, initial encounter    Postoperative wound breakdown, sequela    Jason Hollins is a 63 y.o. 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 Hawkins and Dr. Smith on 3/5/24.  Post operative course was uncomplicated and patient was on RNF until 3/20 for prolonged bed rest to avoid hip flexion to maintain flap integrity.  Patient was on prophylactic lovenox while on bedrest.      3/20: Cardiopulmonary arrest s/p CPR with ROSC after TPA, overnight started on heparin, epo, increased pressor requirements, increased swelling at flap site.      3/21: Hemorrhagic shock, MTP. Firm and large right flap site. Return to OR s/p Exploration of right thigh wound, Evacuation of hematoma. Suture ligation of multiple bleeding vessel and several points in gluteal area.      4/1: s/p Trach     4/4: return OR with ENT s/p laryngoscopy, esophagoscopy and bronchoscopy, trach revision. Found extensive clot burden in esophagus and stomach as well as in the lungs. Oozing at thyroid bed cauterized. Trach exchanged to new 6.0 prox XLT elvie. OR findings:  blood up glottis and from thyroid bed to airway.     4/9: Patient is medically stable for OR on 4/9/24.  He is appropriately n.p.o. since midnight and has had more DVT prophylaxis held for OR today. Per discussion with Plastic surgery, patient is 2nd start case in OR today.     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 Hawkins and Dr. Smith on 3/5/24 s/p cardiopulmonary arrest with ROSC  3/20. CT head 3/22 without acute process. Weaned off cisatracurium and propofol overnight 3/23-3/24. Ketamine weaned off 3/25 and Fentanyl weaned off 3/26 am. Repeat CT Head 3/26 without acute process. MRI Brain 3/30, negative for cerebral infarction or hemorrhage. Neuro exam - off sedation, following commands except for RLE, tracking, nodding/shaking head to questions  - ongoing neuro and pain assessments  - keep off sedation  - PT/OT -> AROM     CV: History of HTN, HLD. Acute cardiopulmonary arrest 2/2 to possible massive PE vs massive STEMI, received multiple rounds of CPR and one defibrillation.  Sustained ROSC achieved after TPA bolus. On high dose levophed, epinephrine, vaso, angioT2. Plan on 3/21 was for ECMO which was aborted secondary to large hemhorrge at right flap site. Had MTP and taken OR with 5L evacuated. ECHO 3/21: Normal LV, Mod enlarged RV, slight suggestion of a Townsend's sign / RV strain, low normal RV function. ECHO 3/22 with hyperdynamic LV. New onset Afib with RVR overnight 3/22-3/23, dosed with 150mg amio x2, started infusion at 1mg/min thereafter. AT2 weaned off. Amio infusion discontinued 3/25. Lactate downtrending. Vaso and epi weaned off. Remains in NSR. iEpo weaned off 3/27. Repeat TTE 3/29 and 4/2 essentially unchanged. Levo resumed 4/2 evening to continue aggressive fluid removal. Off of pressors since 4/5 evening.  - continuous EKG/abp/cvp monitoring  - Goal map range 65-90  -midodrine to 20 mg 3 times daily  - Continue florinef 0.1mg BID   -Increased stress dose steroids to 50 mg q 6h 4/10  -On NE for pressure support  - Repeat Echo due to new pressor requirement 4/10  - Holding heparin infusion for now      PULM: Arrived to SICU intubated julio c-CPR. Concern for massive PE. Started on iEpo, currently on 0.05. Hypoxic respiratory failure. Severe ARDS. ETT exchanged 3/23. CT Chest 3/26 with interval JOHN consolidative/ground glass opacity. S/p Tracheostomy on 4/1. Bedside bronch ->  some blood in trach, posterior wall tissue just distal to trach tip appears to be collapsing on cannula.  - pressure support and rate at night on vent   - ABGs prn  - additional pulm toilet prn -will discuss regimen with RT  - daily CXR -chest x-ray today indicates similar bilateral hazy opacities, consistent with prior day     ENT: Had epistaxis 3/23, completed afrin bid x3 days. S/p trach on 4/1. Bleeding from trach site 4/2 am, packing placed by ENT. Repeat episode of bloody tracheal secretions 4/3 through this am. Taken back to OR with ENT 4/4 as per above.  -No bleeding from trach site noted     GI: NPO. Shock liver, pancreatitis. Elevated TG. Elevated lactate. Consulted ACS for potential bowel ischemia as cause of persistent lactic acidosis. CT imaging 3/22 with evidence of pancreatitis. No acute surgical indication per ACS. RUQ US 3/22 with thickening of GB wall without cholelithiasis. CT A/P 3/26 negative for acute bleed. LFTs downtrending. Worsening hyperbilirubinemia. Amylase and lipase now WNL. Repeat RUQ US 4/1 with nonspecific gallbladder wall edema and thickening which may be 2/2 generalized fluid overload, mild hepatomegaly with slight nodular contour and c/f steatosis and fibrosis, irregular hypoechoic region adjacent to hepatic veins. US liver with doppler 4/2 revealed hepatomegaly with nodular contour, mildly elevated RI in main and left hepatic arteries. Hyperbilirubinemia  - OR dolly for detriment of necrotic tissue 4/11  - NPO at midnight  - Stop SQH in am   - prostat daily  - PPI daily for GI prophy  - Trend LFTs daily -AST/ALT/total bilirubin is approximately similar to prior days.  Per discussion with hepatology yesterday, minor fluctuations are to be expected prior to eventual downtrend     : No history of renal disease. Baseline creatinine 0.6. Anuric RUTH. Elevated CK, downtrending. Metabolic acidosis, total body fluid overload, hyperkalemia. Started on CVVH 3/21, stopped bicarb infusion  3/22. Marino removed 3/24. Hyponatremia resolved. Stopped CVVH 4/2.  Has been tolerating SLED per nephrology recs  - Nephrology following   - RFP BID and PRN  - Replete electrolytes judiciously - RFP to be drawn 4 hours after SLED for repletion of electrolytes  - Bladder scan pending this AM      HEME: Patient was on ppx lovenox for DVT prophylaxis prior to cardiopulmonary arrest. Concern for massive PE patient received bolus of TPA during CPR. Started on heparin infusion overnight given concern for PE. Hemorrhagic shock 3/21, MTP and back to OR s/p Exploration of right thigh woundEvacuation of hematoma. Suture ligation of multiple bleeding vessel and several points in gluteal area. Hematology consulted 3/22 to r/o HLH. Transfused 1 RBC overnight 3/22-3/23. Thrombocytopenia, coagulapathy, hypofibrinogenemia. LE Duplex 3/25 +acute occlusive R soleal DVT. Received 2u PRBC and 1u FFP on 3/26. Heparin infusion discontinued 3/26 to r/o HIT and transitioned to bival. PF4 negative, resumed heparin infuision 3/27. Elevated IL2R and decreased NK function. C/f HLH (low suspicion), empirically treated with decadron (3/28-3/31). Thrombocytopenia on 3/30 to 18k, received 5pk, did not increment. Heparin held. Thrombocytopenia likely 2/2 to consumptive process, hematology following. Received IVIG and 5pk plts 3/31. Pancytopenia persists, improving thrombocytopenia  - cbc, coags bid and prn  -Hemoglobin stable at 9.6 (prior hemoglobin 9.7)  -Transfusing 1 unit packed RBC today in anticipation for OR and blood pressure support  - Hold DVT in morning to OR   - Held pRBC, FFP, Plt for anticipation of OR on 4/11  - Hematology following, appreciate recs -> maintain Plt count >35k  - ongoing monitoring for s/s bleeding  - close surveillance of RLE and drains  - Vasc Med signed off 3/27  - maintain active T&S (4/8)     ENDO: History of DM2. A1c 7.5%. TSH WNL 1/2024. Hyperglycemia  -Patient received Lantus 8u last night given NPO status.  Will monitor blood glucose today.  Once tube feeds are resumed, plan on resuming Lantus 16 units twice daily  - q4h accuchecks and SSI #4     ID: Leukocytosis resolved, now with leukopenia. On broad antimicrobial therapy. MRSA screen + 2/28/24. Pan cultures sent 3/22. Sputum NTF, +MRSA screen (completed 5 day course mupirocin), UCx and BCx negative. Completed 7 day course vanc/zosyn 3/27. Febrile to 39.1 4/3, resent blood cultures and BAL and started vanco and zosyn. Switched zosyn to reynaldo, kept on vanco, added john. Blood cultures and sputum with Klebsiella. Repeat blood cultures sent 4/4.  - F/U cultures  - temp q4h, wbc daily  - Per ID - Meropenem dose changed to 1g q8hr with end date 4/14/24  - ongoing monitoring for s/s infection  - plastics team noted some darkened discoloration of the native tissue at the R posterior hip incision that is c/f necrosis -debridement today  - If continues to be hypotensive consider broaden abx/ add antifungal     Lines:  - right radial a line (4/5)  - RIJ trialysis (3/27)  - PIV x2    Patient seen and discussed with fellow and Dr. Moreno

## 2024-04-11 ENCOUNTER — APPOINTMENT (OUTPATIENT)
Dept: RADIOLOGY | Facility: HOSPITAL | Age: 64
DRG: 003 | End: 2024-04-11
Payer: COMMERCIAL

## 2024-04-11 PROBLEM — Z79.52 CURRENT CHRONIC USE OF SYSTEMIC STEROIDS: Status: ACTIVE | Noted: 2024-04-11

## 2024-04-11 PROBLEM — K21.9 GASTROESOPHAGEAL REFLUX DISEASE WITHOUT ESOPHAGITIS: Status: ACTIVE | Noted: 2024-04-11

## 2024-04-11 LAB
ALBUMIN SERPL BCP-MCNC: 2.3 G/DL (ref 3.4–5)
ALBUMIN SERPL BCP-MCNC: 2.6 G/DL (ref 3.4–5)
ALP SERPL-CCNC: 251 U/L (ref 33–136)
ALT SERPL W P-5'-P-CCNC: 8 U/L (ref 10–52)
ANION GAP BLDA CALCULATED.4IONS-SCNC: 10 MMO/L (ref 10–25)
ANION GAP BLDA CALCULATED.4IONS-SCNC: 10 MMO/L (ref 10–25)
ANION GAP BLDA CALCULATED.4IONS-SCNC: 11 MMO/L (ref 10–25)
ANION GAP SERPL CALC-SCNC: 14 MMOL/L (ref 10–20)
ANION GAP SERPL CALC-SCNC: 15 MMOL/L (ref 10–20)
APTT PPP: 34 SECONDS (ref 27–38)
APTT PPP: 35 SECONDS (ref 27–38)
AST SERPL W P-5'-P-CCNC: 77 U/L (ref 9–39)
BASE EXCESS BLDA CALC-SCNC: -0.2 MMOL/L (ref -2–3)
BASE EXCESS BLDA CALC-SCNC: -2.5 MMOL/L (ref -2–3)
BASE EXCESS BLDA CALC-SCNC: -2.8 MMOL/L (ref -2–3)
BILIRUB DIRECT SERPL-MCNC: 19.2 MG/DL (ref 0–0.3)
BILIRUB SERPL-MCNC: 28.9 MG/DL (ref 0–1.2)
BLOOD EXPIRATION DATE: NORMAL
BODY TEMPERATURE: 37 DEGREES CELSIUS
BUN SERPL-MCNC: 32 MG/DL (ref 6–23)
BUN SERPL-MCNC: 44 MG/DL (ref 6–23)
CA-I BLD-SCNC: 1.07 MMOL/L (ref 1.1–1.33)
CA-I BLD-SCNC: 1.09 MMOL/L (ref 1.1–1.33)
CA-I BLDA-SCNC: 0.99 MMOL/L (ref 1.1–1.33)
CA-I BLDA-SCNC: 1.1 MMOL/L (ref 1.1–1.33)
CA-I BLDA-SCNC: 1.1 MMOL/L (ref 1.1–1.33)
CALCIUM SERPL-MCNC: 7.9 MG/DL (ref 8.6–10.6)
CALCIUM SERPL-MCNC: 8 MG/DL (ref 8.6–10.6)
CHLORIDE BLDA-SCNC: 105 MMOL/L (ref 98–107)
CHLORIDE BLDA-SCNC: 99 MMOL/L (ref 98–107)
CHLORIDE BLDA-SCNC: 99 MMOL/L (ref 98–107)
CHLORIDE SERPL-SCNC: 98 MMOL/L (ref 98–107)
CHLORIDE SERPL-SCNC: 98 MMOL/L (ref 98–107)
CO2 SERPL-SCNC: 24 MMOL/L (ref 21–32)
CO2 SERPL-SCNC: 25 MMOL/L (ref 21–32)
CREAT SERPL-MCNC: 1.44 MG/DL (ref 0.5–1.3)
CREAT SERPL-MCNC: 1.79 MG/DL (ref 0.5–1.3)
DISPENSE STATUS: NORMAL
EGFRCR SERPLBLD CKD-EPI 2021: 42 ML/MIN/1.73M*2
EGFRCR SERPLBLD CKD-EPI 2021: 55 ML/MIN/1.73M*2
ERYTHROCYTE [DISTWIDTH] IN BLOOD BY AUTOMATED COUNT: 19.5 % (ref 11.5–14.5)
ERYTHROCYTE [DISTWIDTH] IN BLOOD BY AUTOMATED COUNT: 20 % (ref 11.5–14.5)
FIBRINOGEN PPP-MCNC: 255 MG/DL (ref 200–400)
GLUCOSE BLD MANUAL STRIP-MCNC: 161 MG/DL (ref 74–99)
GLUCOSE BLD MANUAL STRIP-MCNC: 172 MG/DL (ref 74–99)
GLUCOSE BLD MANUAL STRIP-MCNC: 196 MG/DL (ref 74–99)
GLUCOSE BLD MANUAL STRIP-MCNC: 205 MG/DL (ref 74–99)
GLUCOSE BLD MANUAL STRIP-MCNC: 215 MG/DL (ref 74–99)
GLUCOSE BLD MANUAL STRIP-MCNC: 220 MG/DL (ref 74–99)
GLUCOSE BLD MANUAL STRIP-MCNC: 234 MG/DL (ref 74–99)
GLUCOSE BLDA-MCNC: 185 MG/DL (ref 74–99)
GLUCOSE BLDA-MCNC: 212 MG/DL (ref 74–99)
GLUCOSE BLDA-MCNC: 235 MG/DL (ref 74–99)
GLUCOSE SERPL-MCNC: 182 MG/DL (ref 74–99)
GLUCOSE SERPL-MCNC: 240 MG/DL (ref 74–99)
HCO3 BLDA-SCNC: 19.8 MMOL/L (ref 22–26)
HCO3 BLDA-SCNC: 23.9 MMOL/L (ref 22–26)
HCO3 BLDA-SCNC: 24.1 MMOL/L (ref 22–26)
HCT VFR BLD AUTO: 26 % (ref 41–52)
HCT VFR BLD AUTO: 27.1 % (ref 41–52)
HCT VFR BLD EST: 27 % (ref 41–52)
HCT VFR BLD EST: 30 % (ref 41–52)
HCT VFR BLD EST: 30 % (ref 41–52)
HGB BLD-MCNC: 9.6 G/DL (ref 13.5–17.5)
HGB BLD-MCNC: 9.7 G/DL (ref 13.5–17.5)
HGB BLDA-MCNC: 10.1 G/DL (ref 13.5–17.5)
HGB BLDA-MCNC: 8.9 G/DL (ref 13.5–17.5)
HGB BLDA-MCNC: 9.9 G/DL (ref 13.5–17.5)
INHALED O2 CONCENTRATION: 40 %
INR PPP: 1.3 (ref 0.9–1.1)
INR PPP: 1.4 (ref 0.9–1.1)
LACTATE BLDA-SCNC: 1.6 MMOL/L (ref 0.4–2)
LACTATE BLDA-SCNC: 1.7 MMOL/L (ref 0.4–2)
LACTATE BLDA-SCNC: 1.9 MMOL/L (ref 0.4–2)
MAGNESIUM SERPL-MCNC: 1.96 MG/DL (ref 1.6–2.4)
MAGNESIUM SERPL-MCNC: 2.25 MG/DL (ref 1.6–2.4)
MCH RBC QN AUTO: 28.7 PG (ref 26–34)
MCH RBC QN AUTO: 29.3 PG (ref 26–34)
MCHC RBC AUTO-ENTMCNC: 35.4 G/DL (ref 32–36)
MCHC RBC AUTO-ENTMCNC: 37.3 G/DL (ref 32–36)
MCV RBC AUTO: 79 FL (ref 80–100)
MCV RBC AUTO: 81 FL (ref 80–100)
NRBC BLD-RTO: 0 /100 WBCS (ref 0–0)
NRBC BLD-RTO: 0 /100 WBCS (ref 0–0)
OXYHGB MFR BLDA: 94 % (ref 94–98)
OXYHGB MFR BLDA: 95.4 % (ref 94–98)
OXYHGB MFR BLDA: 96.9 % (ref 94–98)
PCO2 BLDA: 26 MM HG (ref 38–42)
PCO2 BLDA: 36 MM HG (ref 38–42)
PCO2 BLDA: 49 MM HG (ref 38–42)
PH BLDA: 7.3 PH (ref 7.38–7.42)
PH BLDA: 7.43 PH (ref 7.38–7.42)
PH BLDA: 7.49 PH (ref 7.38–7.42)
PHOSPHATE SERPL-MCNC: 2.8 MG/DL (ref 2.5–4.9)
PHOSPHATE SERPL-MCNC: 4.7 MG/DL (ref 2.5–4.9)
PLATELET # BLD AUTO: 84 X10*3/UL (ref 150–450)
PLATELET # BLD AUTO: 85 X10*3/UL (ref 150–450)
PO2 BLDA: 102 MM HG (ref 85–95)
PO2 BLDA: 143 MM HG (ref 85–95)
PO2 BLDA: 85 MM HG (ref 85–95)
POTASSIUM BLDA-SCNC: 2.9 MMOL/L (ref 3.5–5.3)
POTASSIUM BLDA-SCNC: 3.5 MMOL/L (ref 3.5–5.3)
POTASSIUM BLDA-SCNC: 4 MMOL/L (ref 3.5–5.3)
POTASSIUM SERPL-SCNC: 3.8 MMOL/L (ref 3.5–5.3)
POTASSIUM SERPL-SCNC: 4.1 MMOL/L (ref 3.5–5.3)
PRODUCT BLOOD TYPE: 5100
PRODUCT CODE: NORMAL
PROT SERPL-MCNC: 4.9 G/DL (ref 6.4–8.2)
PROTHROMBIN TIME: 14.9 SECONDS (ref 9.8–12.8)
PROTHROMBIN TIME: 15.8 SECONDS (ref 9.8–12.8)
RBC # BLD AUTO: 3.31 X10*6/UL (ref 4.5–5.9)
RBC # BLD AUTO: 3.35 X10*6/UL (ref 4.5–5.9)
SAO2 % BLDA: 100 % (ref 94–100)
SAO2 % BLDA: 97 % (ref 94–100)
SAO2 % BLDA: 99 % (ref 94–100)
SODIUM BLDA-SCNC: 129 MMOL/L (ref 136–145)
SODIUM BLDA-SCNC: 130 MMOL/L (ref 136–145)
SODIUM BLDA-SCNC: 132 MMOL/L (ref 136–145)
SODIUM SERPL-SCNC: 133 MMOL/L (ref 136–145)
SODIUM SERPL-SCNC: 133 MMOL/L (ref 136–145)
UNIT ABO: NORMAL
UNIT NUMBER: NORMAL
UNIT RH: NORMAL
UNIT VOLUME: 292
UNIT VOLUME: 350
UNIT VOLUME: 350
WBC # BLD AUTO: 10.7 X10*3/UL (ref 4.4–11.3)
WBC # BLD AUTO: 8.5 X10*3/UL (ref 4.4–11.3)
XM INTEP: NORMAL

## 2024-04-11 PROCEDURE — 2500000004 HC RX 250 GENERAL PHARMACY W/ HCPCS (ALT 636 FOR OP/ED): Performed by: PHYSICIAN ASSISTANT

## 2024-04-11 PROCEDURE — 99232 SBSQ HOSP IP/OBS MODERATE 35: CPT | Performed by: STUDENT IN AN ORGANIZED HEALTH CARE EDUCATION/TRAINING PROGRAM

## 2024-04-11 PROCEDURE — 99232 SBSQ HOSP IP/OBS MODERATE 35: CPT | Performed by: PHYSICIAN ASSISTANT

## 2024-04-11 PROCEDURE — 3600000002 HC OR TIME - INITIAL BASE CHARGE - PROCEDURE LEVEL TWO

## 2024-04-11 PROCEDURE — 2020000001 HC ICU ROOM DAILY

## 2024-04-11 PROCEDURE — 84132 ASSAY OF SERUM POTASSIUM: CPT

## 2024-04-11 PROCEDURE — 82248 BILIRUBIN DIRECT: CPT

## 2024-04-11 PROCEDURE — 85730 THROMBOPLASTIN TIME PARTIAL: CPT | Performed by: STUDENT IN AN ORGANIZED HEALTH CARE EDUCATION/TRAINING PROGRAM

## 2024-04-11 PROCEDURE — 37799 UNLISTED PX VASCULAR SURGERY: CPT | Performed by: STUDENT IN AN ORGANIZED HEALTH CARE EDUCATION/TRAINING PROGRAM

## 2024-04-11 PROCEDURE — 82330 ASSAY OF CALCIUM: CPT | Performed by: STUDENT IN AN ORGANIZED HEALTH CARE EDUCATION/TRAINING PROGRAM

## 2024-04-11 PROCEDURE — 13160 SEC CLSR SURG WND/DEHSN XTN: CPT

## 2024-04-11 PROCEDURE — 99291 CRITICAL CARE FIRST HOUR: CPT | Performed by: STUDENT IN AN ORGANIZED HEALTH CARE EDUCATION/TRAINING PROGRAM

## 2024-04-11 PROCEDURE — 2720000007 HC OR 272 NO HCPCS

## 2024-04-11 PROCEDURE — 2500000004 HC RX 250 GENERAL PHARMACY W/ HCPCS (ALT 636 FOR OP/ED): Performed by: STUDENT IN AN ORGANIZED HEALTH CARE EDUCATION/TRAINING PROGRAM

## 2024-04-11 PROCEDURE — 3600000007 HC OR TIME - EACH INCREMENTAL 1 MINUTE - PROCEDURE LEVEL TWO

## 2024-04-11 PROCEDURE — P9047 ALBUMIN (HUMAN), 25%, 50ML: HCPCS | Mod: JZ | Performed by: STUDENT IN AN ORGANIZED HEALTH CARE EDUCATION/TRAINING PROGRAM

## 2024-04-11 PROCEDURE — 2500000001 HC RX 250 WO HCPCS SELF ADMINISTERED DRUGS (ALT 637 FOR MEDICARE OP): Performed by: STUDENT IN AN ORGANIZED HEALTH CARE EDUCATION/TRAINING PROGRAM

## 2024-04-11 PROCEDURE — 83735 ASSAY OF MAGNESIUM: CPT | Performed by: STUDENT IN AN ORGANIZED HEALTH CARE EDUCATION/TRAINING PROGRAM

## 2024-04-11 PROCEDURE — 84132 ASSAY OF SERUM POTASSIUM: CPT | Performed by: STUDENT IN AN ORGANIZED HEALTH CARE EDUCATION/TRAINING PROGRAM

## 2024-04-11 PROCEDURE — 85027 COMPLETE CBC AUTOMATED: CPT | Performed by: STUDENT IN AN ORGANIZED HEALTH CARE EDUCATION/TRAINING PROGRAM

## 2024-04-11 PROCEDURE — 2500000002 HC RX 250 W HCPCS SELF ADMINISTERED DRUGS (ALT 637 FOR MEDICARE OP, ALT 636 FOR OP/ED): Performed by: STUDENT IN AN ORGANIZED HEALTH CARE EDUCATION/TRAINING PROGRAM

## 2024-04-11 PROCEDURE — 2500000001 HC RX 250 WO HCPCS SELF ADMINISTERED DRUGS (ALT 637 FOR MEDICARE OP): Performed by: NURSE PRACTITIONER

## 2024-04-11 PROCEDURE — 71045 X-RAY EXAM CHEST 1 VIEW: CPT | Performed by: RADIOLOGY

## 2024-04-11 PROCEDURE — 85610 PROTHROMBIN TIME: CPT

## 2024-04-11 PROCEDURE — 99233 SBSQ HOSP IP/OBS HIGH 50: CPT | Performed by: STUDENT IN AN ORGANIZED HEALTH CARE EDUCATION/TRAINING PROGRAM

## 2024-04-11 PROCEDURE — 11042 DBRDMT SUBQ TIS 1ST 20SQCM/<: CPT

## 2024-04-11 PROCEDURE — 13160 SEC CLSR SURG WND/DEHSN XTN: CPT | Performed by: PHYSICIAN ASSISTANT

## 2024-04-11 PROCEDURE — C9113 INJ PANTOPRAZOLE SODIUM, VIA: HCPCS | Performed by: PHYSICIAN ASSISTANT

## 2024-04-11 PROCEDURE — 87102 FUNGUS ISOLATION CULTURE: CPT | Performed by: NURSE PRACTITIONER

## 2024-04-11 PROCEDURE — 84100 ASSAY OF PHOSPHORUS: CPT

## 2024-04-11 PROCEDURE — 2500000004 HC RX 250 GENERAL PHARMACY W/ HCPCS (ALT 636 FOR OP/ED)

## 2024-04-11 PROCEDURE — 2500000005 HC RX 250 GENERAL PHARMACY W/O HCPCS: Performed by: NURSE ANESTHETIST, CERTIFIED REGISTERED

## 2024-04-11 PROCEDURE — A11043 PR DEBRIDEMENT, SKIN, SUB-Q TISSUE,MUSCLE,=<20 SQ CM: Performed by: NURSE ANESTHETIST, CERTIFIED REGISTERED

## 2024-04-11 PROCEDURE — 85384 FIBRINOGEN ACTIVITY: CPT

## 2024-04-11 PROCEDURE — A11043 PR DEBRIDEMENT, SKIN, SUB-Q TISSUE,MUSCLE,=<20 SQ CM: Performed by: ANESTHESIOLOGY

## 2024-04-11 PROCEDURE — 3700000002 HC GENERAL ANESTHESIA TIME - EACH INCREMENTAL 1 MINUTE

## 2024-04-11 PROCEDURE — 83735 ASSAY OF MAGNESIUM: CPT

## 2024-04-11 PROCEDURE — 87185 SC STD ENZYME DETCJ PER NZM: CPT | Performed by: NURSE PRACTITIONER

## 2024-04-11 PROCEDURE — 11045 DBRDMT SUBQ TISS EACH ADDL: CPT

## 2024-04-11 PROCEDURE — 0JB90ZZ EXCISION OF BUTTOCK SUBCUTANEOUS TISSUE AND FASCIA, OPEN APPROACH: ICD-10-PCS

## 2024-04-11 PROCEDURE — 71045 X-RAY EXAM CHEST 1 VIEW: CPT

## 2024-04-11 PROCEDURE — 2500000005 HC RX 250 GENERAL PHARMACY W/O HCPCS

## 2024-04-11 PROCEDURE — 2500000004 HC RX 250 GENERAL PHARMACY W/ HCPCS (ALT 636 FOR OP/ED): Performed by: NURSE ANESTHETIST, CERTIFIED REGISTERED

## 2024-04-11 PROCEDURE — 90937 HEMODIALYSIS REPEATED EVAL: CPT

## 2024-04-11 PROCEDURE — 3700000001 HC GENERAL ANESTHESIA TIME - INITIAL BASE CHARGE

## 2024-04-11 PROCEDURE — 94003 VENT MGMT INPAT SUBQ DAY: CPT

## 2024-04-11 PROCEDURE — 82947 ASSAY GLUCOSE BLOOD QUANT: CPT

## 2024-04-11 PROCEDURE — 85027 COMPLETE CBC AUTOMATED: CPT

## 2024-04-11 RX ORDER — MIDAZOLAM HYDROCHLORIDE 1 MG/ML
INJECTION INTRAMUSCULAR; INTRAVENOUS AS NEEDED
Status: DISCONTINUED | OUTPATIENT
Start: 2024-04-11 | End: 2024-04-11

## 2024-04-11 RX ORDER — FENTANYL CITRATE 50 UG/ML
INJECTION, SOLUTION INTRAMUSCULAR; INTRAVENOUS AS NEEDED
Status: DISCONTINUED | OUTPATIENT
Start: 2024-04-11 | End: 2024-04-11

## 2024-04-11 RX ORDER — PHENYLEPHRINE HYDROCHLORIDE 10 MG/ML
INJECTION INTRAVENOUS AS NEEDED
Status: DISCONTINUED | OUTPATIENT
Start: 2024-04-11 | End: 2024-04-11

## 2024-04-11 RX ORDER — MAGNESIUM SULFATE HEPTAHYDRATE 40 MG/ML
2 INJECTION, SOLUTION INTRAVENOUS ONCE
Status: COMPLETED | OUTPATIENT
Start: 2024-04-11 | End: 2024-04-11

## 2024-04-11 RX ORDER — ONDANSETRON HYDROCHLORIDE 2 MG/ML
INJECTION, SOLUTION INTRAVENOUS AS NEEDED
Status: DISCONTINUED | OUTPATIENT
Start: 2024-04-11 | End: 2024-04-11

## 2024-04-11 RX ORDER — HEPARIN SODIUM 5000 [USP'U]/ML
5000 INJECTION, SOLUTION INTRAVENOUS; SUBCUTANEOUS EVERY 8 HOURS
Status: DISCONTINUED | OUTPATIENT
Start: 2024-04-11 | End: 2024-04-16

## 2024-04-11 RX ORDER — PROPOFOL 10 MG/ML
INJECTION, EMULSION INTRAVENOUS CONTINUOUS PRN
Status: DISCONTINUED | OUTPATIENT
Start: 2024-04-11 | End: 2024-04-11

## 2024-04-11 RX ORDER — INSULIN GLARGINE 100 [IU]/ML
16 INJECTION, SOLUTION SUBCUTANEOUS EVERY 12 HOURS
Status: DISCONTINUED | OUTPATIENT
Start: 2024-04-12 | End: 2024-04-12

## 2024-04-11 RX ORDER — ROCURONIUM BROMIDE 10 MG/ML
INJECTION, SOLUTION INTRAVENOUS AS NEEDED
Status: DISCONTINUED | OUTPATIENT
Start: 2024-04-11 | End: 2024-04-11

## 2024-04-11 RX ORDER — DEXTROSE 50 % IN WATER (D50W) INTRAVENOUS SYRINGE
12.5
Status: DISCONTINUED | OUTPATIENT
Start: 2024-04-11 | End: 2024-04-11

## 2024-04-11 RX ORDER — PHENYLEPHRINE HCL IN 0.9% NACL 0.4MG/10ML
SYRINGE (ML) INTRAVENOUS AS NEEDED
Status: DISCONTINUED | OUTPATIENT
Start: 2024-04-11 | End: 2024-04-11

## 2024-04-11 RX ORDER — DEXTROSE 50 % IN WATER (D50W) INTRAVENOUS SYRINGE
25
Status: DISCONTINUED | OUTPATIENT
Start: 2024-04-11 | End: 2024-04-11

## 2024-04-11 RX ORDER — DEXTROSE 50 % IN WATER (D50W) INTRAVENOUS SYRINGE
12.5
Status: DISCONTINUED | OUTPATIENT
Start: 2024-04-11 | End: 2024-04-26

## 2024-04-11 RX ORDER — ALBUMIN HUMAN 250 G/1000ML
12.5 SOLUTION INTRAVENOUS ONCE
Status: COMPLETED | OUTPATIENT
Start: 2024-04-11 | End: 2024-04-11

## 2024-04-11 RX ORDER — INSULIN GLARGINE 100 [IU]/ML
8 INJECTION, SOLUTION SUBCUTANEOUS NIGHTLY
Status: COMPLETED | OUTPATIENT
Start: 2024-04-11 | End: 2024-04-11

## 2024-04-11 RX ORDER — PHENYLEPHRINE 10 MG/250 ML(40 MCG/ML)IN 0.9 % SOD.CHLORIDE INTRAVENOUS
CONTINUOUS PRN
Status: DISCONTINUED | OUTPATIENT
Start: 2024-04-11 | End: 2024-04-11

## 2024-04-11 RX ADMIN — Medication 160 MCG: at 13:05

## 2024-04-11 RX ADMIN — FENTANYL CITRATE 25 MCG: 50 INJECTION, SOLUTION INTRAMUSCULAR; INTRAVENOUS at 13:14

## 2024-04-11 RX ADMIN — MIDAZOLAM HYDROCHLORIDE 2 MG: 1 INJECTION, SOLUTION INTRAMUSCULAR; INTRAVENOUS at 12:34

## 2024-04-11 RX ADMIN — INSULIN LISPRO 4 UNITS: 100 INJECTION, SOLUTION INTRAVENOUS; SUBCUTANEOUS at 11:31

## 2024-04-11 RX ADMIN — INSULIN LISPRO 4 UNITS: 100 INJECTION, SOLUTION INTRAVENOUS; SUBCUTANEOUS at 16:21

## 2024-04-11 RX ADMIN — Medication 1 G: at 05:35

## 2024-04-11 RX ADMIN — Medication 1 G: at 21:27

## 2024-04-11 RX ADMIN — Medication 120 MCG: at 13:02

## 2024-04-11 RX ADMIN — SODIUM CHLORIDE, POTASSIUM CHLORIDE, SODIUM LACTATE AND CALCIUM CHLORIDE 5 ML/HR: 600; 310; 30; 20 INJECTION, SOLUTION INTRAVENOUS at 16:21

## 2024-04-11 RX ADMIN — PANTOPRAZOLE SODIUM 40 MG: 40 INJECTION, POWDER, FOR SOLUTION INTRAVENOUS at 09:57

## 2024-04-11 RX ADMIN — ONDANSETRON 4 MG: 2 INJECTION INTRAMUSCULAR; INTRAVENOUS at 14:10

## 2024-04-11 RX ADMIN — ALBUMIN HUMAN 12.5 G: 0.25 SOLUTION INTRAVENOUS at 16:21

## 2024-04-11 RX ADMIN — HYDROCORTISONE SODIUM SUCCINATE 50 MG: 100 INJECTION, POWDER, FOR SOLUTION INTRAMUSCULAR; INTRAVENOUS at 09:57

## 2024-04-11 RX ADMIN — HYDROCORTISONE SODIUM SUCCINATE 50 MG: 100 INJECTION, POWDER, FOR SOLUTION INTRAMUSCULAR; INTRAVENOUS at 21:51

## 2024-04-11 RX ADMIN — Medication 0.5 MCG/KG/MIN: at 13:10

## 2024-04-11 RX ADMIN — MIDODRINE HYDROCHLORIDE 20 MG: 10 TABLET ORAL at 17:45

## 2024-04-11 RX ADMIN — HYDROCORTISONE SODIUM SUCCINATE 50 MG: 100 INJECTION, POWDER, FOR SOLUTION INTRAMUSCULAR; INTRAVENOUS at 05:35

## 2024-04-11 RX ADMIN — FENTANYL CITRATE 25 MCG: 50 INJECTION, SOLUTION INTRAMUSCULAR; INTRAVENOUS at 13:32

## 2024-04-11 RX ADMIN — INSULIN GLARGINE 8 UNITS: 100 INJECTION, SOLUTION SUBCUTANEOUS at 21:28

## 2024-04-11 RX ADMIN — PHENYLEPHRINE HYDROCHLORIDE 120 MCG: 10 INJECTION INTRAVENOUS at 15:28

## 2024-04-11 RX ADMIN — FLUDROCORTISONE ACETATE 0.1 MG: 0.1 TABLET ORAL at 12:00

## 2024-04-11 RX ADMIN — MIDODRINE HYDROCHLORIDE 20 MG: 10 TABLET ORAL at 09:57

## 2024-04-11 RX ADMIN — HEPARIN SODIUM 5000 UNITS: 5000 INJECTION INTRAVENOUS; SUBCUTANEOUS at 21:28

## 2024-04-11 RX ADMIN — Medication 120 MCG: at 13:10

## 2024-04-11 RX ADMIN — INSULIN LISPRO 8 UNITS: 100 INJECTION, SOLUTION INTRAVENOUS; SUBCUTANEOUS at 00:55

## 2024-04-11 RX ADMIN — FENTANYL CITRATE 25 MCG: 50 INJECTION, SOLUTION INTRAMUSCULAR; INTRAVENOUS at 15:29

## 2024-04-11 RX ADMIN — Medication 160 MCG: at 12:50

## 2024-04-11 RX ADMIN — INSULIN LISPRO 8 UNITS: 100 INJECTION, SOLUTION INTRAVENOUS; SUBCUTANEOUS at 07:58

## 2024-04-11 RX ADMIN — Medication 1 G: at 13:04

## 2024-04-11 RX ADMIN — Medication 80 MCG: at 12:55

## 2024-04-11 RX ADMIN — ROCURONIUM 100 MG: 50 INJECTION, SOLUTION INTRAVENOUS at 12:58

## 2024-04-11 RX ADMIN — MAGNESIUM SULFATE HEPTAHYDRATE 2 G: 40 INJECTION, SOLUTION INTRAVENOUS at 06:52

## 2024-04-11 RX ADMIN — HYDROCORTISONE SODIUM SUCCINATE 50 MG: 100 INJECTION, POWDER, FOR SOLUTION INTRAMUSCULAR; INTRAVENOUS at 16:21

## 2024-04-11 RX ADMIN — INSULIN LISPRO 8 UNITS: 100 INJECTION, SOLUTION INTRAVENOUS; SUBCUTANEOUS at 05:00

## 2024-04-11 RX ADMIN — Medication 160 MCG: at 12:44

## 2024-04-11 RX ADMIN — FENTANYL CITRATE 25 MCG: 50 INJECTION, SOLUTION INTRAMUSCULAR; INTRAVENOUS at 14:10

## 2024-04-11 RX ADMIN — POTASSIUM PHOSPHATE, MONOBASIC POTASSIUM PHOSPHATE, DIBASIC 15 MMOL: 224; 236 INJECTION, SOLUTION, CONCENTRATE INTRAVENOUS at 06:54

## 2024-04-11 RX ADMIN — INSULIN LISPRO 4 UNITS: 100 INJECTION, SOLUTION INTRAVENOUS; SUBCUTANEOUS at 21:31

## 2024-04-11 RX ADMIN — FLUDROCORTISONE ACETATE 0.1 MG: 0.1 TABLET ORAL at 05:35

## 2024-04-11 RX ADMIN — MIDODRINE HYDROCHLORIDE 20 MG: 10 TABLET ORAL at 02:45

## 2024-04-11 SDOH — HEALTH STABILITY: MENTAL HEALTH: CURRENT SMOKER: 0

## 2024-04-11 ASSESSMENT — PAIN - FUNCTIONAL ASSESSMENT
PAIN_FUNCTIONAL_ASSESSMENT: 0-10
PAIN_FUNCTIONAL_ASSESSMENT: CPOT (CRITICAL CARE PAIN OBSERVATION TOOL)
PAIN_FUNCTIONAL_ASSESSMENT: 0-10

## 2024-04-11 ASSESSMENT — PAIN SCALES - GENERAL
PAINLEVEL_OUTOF10: 0 - NO PAIN

## 2024-04-11 NOTE — PROGRESS NOTES
"Jason Hollins is a 63 y.o. male on day 37 of admission presenting with Soft tissue sarcoma (CMS/HCC).    Subjective   Overnight, pt was NPO with TF held at midnight for OR today 4/11. On levo 0.03 overnight for BP support.    This morning, patient off pressors and maintaining MAP > 65. Alert and responsive in no significant pain.    Intra-op details: Approx 9cm necrotic tissue and deep tissue removed with placement of wound vac. Briefly on phenylephrine gtt 0.2mcg/kg/min - stopped prior to transfer. Given 500cc crystalloid. Patient reversed prior to transfer back to SICU. Upon arrival to SICU, patient hemodynamically stable and on CPAP.       Objective     Physical Exam  Constitutional:       Appearance: Normal appearance.   Eyes:      Pupils: Pupils are equal, round, and reactive to light.   Cardiovascular:      Rate and Rhythm: Normal rate and regular rhythm.   Pulmonary:      Comments: Inspiratory crackles and diminished breath sounds auscultated bilaterally  Abdominal:      General: Abdomen is flat. There is no distension.      Palpations: Abdomen is soft.   Musculoskeletal:      Comments: 2+ pitting edema noted bilaterally   Skin:     General: Skin is warm.      Comments: Surgical dressing in place with wound vac and 3 ANDERSON drains in place. No surrounding erythema or swelling.   Neurological:      Mental Status: He is alert.      Comments: Alert, responding to verbal commands with thumbs up, squeezing hands, moving L foot (similar to prior)         Last Recorded Vitals  Blood pressure 136/60, pulse 85, temperature 36.6 °C (97.9 °F), temperature source Temporal, resp. rate (!) 36, height 1.778 m (5' 10\"), weight 125 kg (275 lb 9.2 oz), SpO2 95%.  Intake/Output last 3 Shifts:  I/O last 3 completed shifts:  In: 1736.5 (17.2 mL/kg) [I.V.:281.5 (2.8 mL/kg); NG/GT:1355; IV Piggyback:100]  Out: 1505 (14.9 mL/kg) [Urine:50 (0 mL/kg/hr); Drains:765; Other:690]  Dosing Weight: 101 kg     Relevant Results  Scheduled " medications  [Transfer Hold] fludrocortisone, 0.1 mg, nasogastric tube, q12h  [Held by provider] heparin (porcine), 5,000 Units, subcutaneous, q8h  [Transfer Hold] hydrocortisone sodium succinate, 50 mg, intravenous, q6h  [Transfer Hold] insulin lispro, 0-20 Units, subcutaneous, q4h  [Transfer Hold] meropenem, 1 g, intravenous, q8h  [Transfer Hold] midodrine, 20 mg, orogastric tube, q8h  oxygen, , inhalation, Continuous - Inhalation  [Transfer Hold] pantoprazole, 40 mg, intravenous, Daily      Continuous medications  lactated Ringer's, 5 mL/hr, Last Rate: 5 mL/hr (04/11/24 1234)  norepinephrine, 0.01-0.5 mcg/kg/min (Dosing Weight), Last Rate: Stopped (04/11/24 1035)      PRN medications  PRN medications: [Transfer Hold] alteplase, [Transfer Hold] calcium gluconate, [Transfer Hold] calcium gluconate, [Transfer Hold] dextrose **OR** [Transfer Hold] glucagon, [Transfer Hold] magnesium sulfate, [Transfer Hold] magnesium sulfate, [Transfer Hold] sodium chloride  Results for orders placed or performed during the hospital encounter of 03/05/24 (from the past 24 hour(s))   Blood Gas Arterial Full Panel   Result Value Ref Range    POCT pH, Arterial 7.44 (H) 7.38 - 7.42 pH    POCT pCO2, Arterial 38 38 - 42 mm Hg    POCT pO2, Arterial 83 (L) 85 - 95 mm Hg    POCT SO2, Arterial 99 94 - 100 %    POCT Oxy Hemoglobin, Arterial 95.2 94.0 - 98.0 %    POCT Hematocrit Calculated, Arterial 30.0 (L) 41.0 - 52.0 %    POCT Sodium, Arterial 131 (L) 136 - 145 mmol/L    POCT Potassium, Arterial 3.2 (L) 3.5 - 5.3 mmol/L    POCT Chloride, Arterial 99 98 - 107 mmol/L    POCT Ionized Calcium, Arterial 1.12 1.10 - 1.33 mmol/L    POCT Glucose, Arterial 176 (H) 74 - 99 mg/dL    POCT Lactate, Arterial 1.5 0.4 - 2.0 mmol/L    POCT Base Excess, Arterial 1.6 -2.0 - 3.0 mmol/L    POCT HCO3 Calculated, Arterial 25.8 22.0 - 26.0 mmol/L    POCT Hemoglobin, Arterial 10.0 (L) 13.5 - 17.5 g/dL    POCT Anion Gap, Arterial 9 (L) 10 - 25 mmo/L    Patient  Temperature 37.0 degrees Celsius    FiO2 40 %   Transthoracic Echo (TTE) Limited   Result Value Ref Range    LV Biplane EF 72 %    MV avg E/e' ratio 9.50     MV E/A ratio 1.16     Tricuspid annular plane systolic excursion 3.3 cm    RV free wall pk S' 16.40 cm/s    LV A4C EF 73.1    POCT GLUCOSE   Result Value Ref Range    POCT Glucose 229 (H) 74 - 99 mg/dL   POCT GLUCOSE   Result Value Ref Range    POCT Glucose 223 (H) 74 - 99 mg/dL   CBC   Result Value Ref Range    WBC 8.5 4.4 - 11.3 x10*3/uL    nRBC 0.0 0.0 - 0.0 /100 WBCs    RBC 3.35 (L) 4.50 - 5.90 x10*6/uL    Hemoglobin 9.6 (L) 13.5 - 17.5 g/dL    Hematocrit 27.1 (L) 41.0 - 52.0 %    MCV 81 80 - 100 fL    MCH 28.7 26.0 - 34.0 pg    MCHC 35.4 32.0 - 36.0 g/dL    RDW 20.0 (H) 11.5 - 14.5 %    Platelets 84 (L) 150 - 450 x10*3/uL   CALCIUM, IONIZED   Result Value Ref Range    POCT Calcium, Ionized 1.07 (L) 1.1 - 1.33 mmol/L   Coagulation Screen   Result Value Ref Range    Protime 15.8 (H) 9.8 - 12.8 seconds    INR 1.4 (H) 0.9 - 1.1    aPTT 35 27 - 38 seconds   Hepatic function panel   Result Value Ref Range    Albumin 2.6 (L) 3.4 - 5.0 g/dL    Bilirubin, Total 28.9 (H) 0.0 - 1.2 mg/dL    Bilirubin, Direct 19.2 (H) 0.0 - 0.3 mg/dL    Alkaline Phosphatase 251 (H) 33 - 136 U/L    ALT 8 (L) 10 - 52 U/L    AST 77 (H) 9 - 39 U/L    Total Protein 4.9 (L) 6.4 - 8.2 g/dL   Magnesium   Result Value Ref Range    Magnesium 1.96 1.60 - 2.40 mg/dL   Fibrinogen   Result Value Ref Range    Fibrinogen 255 200 - 400 mg/dL   Basic Metabolic Panel   Result Value Ref Range    Glucose 240 (H) 74 - 99 mg/dL    Sodium 133 (L) 136 - 145 mmol/L    Potassium 3.8 3.5 - 5.3 mmol/L    Chloride 98 98 - 107 mmol/L    Bicarbonate 24 21 - 32 mmol/L    Anion Gap 15 10 - 20 mmol/L    Urea Nitrogen 32 (H) 6 - 23 mg/dL    Creatinine 1.44 (H) 0.50 - 1.30 mg/dL    eGFR 55 (L) >60 mL/min/1.73m*2    Calcium 8.0 (L) 8.6 - 10.6 mg/dL   Phosphorus   Result Value Ref Range    Phosphorus 2.8 2.5 - 4.9 mg/dL    Blood Gas Arterial Full Panel   Result Value Ref Range    POCT pH, Arterial 7.49 (H) 7.38 - 7.42 pH    POCT pCO2, Arterial 26 (L) 38 - 42 mm Hg    POCT pO2, Arterial 143 (H) 85 - 95 mm Hg    POCT SO2, Arterial 100 94 - 100 %    POCT Oxy Hemoglobin, Arterial 96.9 94.0 - 98.0 %    POCT Hematocrit Calculated, Arterial 27.0 (L) 41.0 - 52.0 %    POCT Sodium, Arterial 132 (L) 136 - 145 mmol/L    POCT Potassium, Arterial 2.9 (LL) 3.5 - 5.3 mmol/L    POCT Chloride, Arterial 105 98 - 107 mmol/L    POCT Ionized Calcium, Arterial 0.99 (L) 1.10 - 1.33 mmol/L    POCT Glucose, Arterial 212 (H) 74 - 99 mg/dL    POCT Lactate, Arterial 1.7 0.4 - 2.0 mmol/L    POCT Base Excess, Arterial -2.8 (L) -2.0 - 3.0 mmol/L    POCT HCO3 Calculated, Arterial 19.8 (L) 22.0 - 26.0 mmol/L    POCT Hemoglobin, Arterial 8.9 (L) 13.5 - 17.5 g/dL    POCT Anion Gap, Arterial 10 10 - 25 mmo/L    Patient Temperature 37.0 degrees Celsius    FiO2 40 %   POCT GLUCOSE   Result Value Ref Range    POCT Glucose 220 (H) 74 - 99 mg/dL   POCT GLUCOSE   Result Value Ref Range    POCT Glucose 234 (H) 74 - 99 mg/dL   POCT GLUCOSE   Result Value Ref Range    POCT Glucose 205 (H) 74 - 99 mg/dL   Blood Gas Arterial Full Panel   Result Value Ref Range    POCT pH, Arterial 7.43 (H) 7.38 - 7.42 pH    POCT pCO2, Arterial 36 (L) 38 - 42 mm Hg    POCT pO2, Arterial 102 (H) 85 - 95 mm Hg    POCT SO2, Arterial 99 94 - 100 %    POCT Oxy Hemoglobin, Arterial 95.4 94.0 - 98.0 %    POCT Hematocrit Calculated, Arterial 30.0 (L) 41.0 - 52.0 %    POCT Sodium, Arterial 130 (L) 136 - 145 mmol/L    POCT Potassium, Arterial 3.5 3.5 - 5.3 mmol/L    POCT Chloride, Arterial 99 98 - 107 mmol/L    POCT Ionized Calcium, Arterial 1.10 1.10 - 1.33 mmol/L    POCT Glucose, Arterial 235 (H) 74 - 99 mg/dL    POCT Lactate, Arterial 1.6 0.4 - 2.0 mmol/L    POCT Base Excess, Arterial -0.2 -2.0 - 3.0 mmol/L    POCT HCO3 Calculated, Arterial 23.9 22.0 - 26.0 mmol/L    POCT Hemoglobin, Arterial 10.1 (L)  13.5 - 17.5 g/dL    POCT Anion Gap, Arterial 11 10 - 25 mmo/L    Patient Temperature 37.0 degrees Celsius    FiO2 40 %   POCT GLUCOSE   Result Value Ref Range    POCT Glucose 215 (H) 74 - 99 mg/dL   POCT GLUCOSE   Result Value Ref Range    POCT Glucose 161 (H) 74 - 99 mg/dL                            Assessment/Plan   Principal Problem:    Soft tissue sarcoma (CMS/HCC)  Active Problems:    Acute kidney injury (nontraumatic) (CMS/HCC)    Pulmonary embolus (CMS/HCC)    Anemia    Thrombocytopenia (CMS/HCC)    Current chronic use of systemic steroids    Gastroesophageal reflux disease without esophagitis    Extraskeletal myxoid chondrosarcoma (CMS/HCC)    Cardiopulmonary arrest (CMS/HCC)    Acute respiratory failure with hypoxia (CMS/HCC)    Tracheostomy hemorrhage (CMS/HCC)    Postoperative wound breakdown, initial encounter    Postoperative wound breakdown, sequela    Jason Hollins is a 63 y.o. 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 Hawkins and Dr. Smith on 3/5/24.  Post operative course was uncomplicated and patient was on RNF until 3/20 for prolonged bed rest to avoid hip flexion to maintain flap integrity.  Patient was on prophylactic lovenox while on bedrest.      3/20: Cardiopulmonary arrest s/p CPR with ROSC after TPA, overnight started on heparin, epo, increased pressor requirements, increased swelling at flap site.      3/21: Hemorrhagic shock, MTP. Firm and large right flap site. Return to OR s/p Exploration of right thigh wound, Evacuation of hematoma. Suture ligation of multiple bleeding vessel and several points in gluteal area.      4/1: s/p Trach     4/4: return OR with ENT s/p laryngoscopy, esophagoscopy and bronchoscopy, trach revision. Found extensive clot burden in esophagus and stomach as well as in  the lungs. Oozing at thyroid bed cauterized. Trach exchanged to new 6.0 prox XLT chrisjennifer. OR findings:  blood up glottis and from thyroid bed to airway.     4/9: Patient is medically stable for OR on 4/11/24.  He is appropriately n.p.o. since midnight and has had more DVT prophylaxis held for OR today.    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 Hawkins and Dr. Smith on 3/5/24 s/p cardiopulmonary arrest with ROSC 3/20. CT head 3/22 without acute process. Weaned off cisatracurium and propofol overnight 3/23-3/24. Ketamine weaned off 3/25 and Fentanyl weaned off 3/26 am. Repeat CT Head 3/26 without acute process. MRI Brain 3/30, negative for cerebral infarction or hemorrhage. Neuro exam - off sedation, following commands except for RLE, tracking, nodding/shaking head to questions  - ongoing neuro and pain assessments  - keep off sedation  - PT/OT -> AROM     CV: History of HTN, HLD. Acute cardiopulmonary arrest 2/2 to possible massive PE vs massive STEMI, received multiple rounds of CPR and one defibrillation.  Sustained ROSC achieved after TPA bolus. On high dose levophed, epinephrine, vaso, angioT2. Plan on 3/21 was for ECMO which was aborted secondary to large hemhorrge at right flap site. Had MTP and taken OR with 5L evacuated. ECHO 3/21: Normal LV, Mod enlarged RV, slight suggestion of a Townsend's sign / RV strain, low normal RV function. ECHO 3/22 with hyperdynamic LV. New onset Afib with RVR overnight 3/22-3/23, dosed with 150mg amio x2, started infusion at 1mg/min thereafter. AT2 weaned off. Amio infusion discontinued 3/25. Lactate downtrending. Vaso and epi weaned off. Remains in NSR. iEpo weaned off 3/27. Repeat TTE 3/29 and 4/2 essentially unchanged. Levo resumed 4/2 evening to continue aggressive fluid  removal. Repeat TTE on 4/10 with LVEF 65-70% and RV difficulty to assess.  - continuous EKG/abp/cvp monitoring  - Goal map range 65-90  -midodrine to 20 mg 3 times daily  - Continue florinef 0.1mg BID   -Continue stress dose steroids to 50 mg q 6h   - Holding heparin infusion for now      PULM: Arrived to SICU intubated julio c-CPR. Concern for massive PE. Started on iEpo, currently on 0.05. Hypoxic respiratory failure. Severe ARDS. ETT exchanged 3/23. CT Chest 3/26 with interval JOHN consolidative/ground glass opacity. S/p Tracheostomy on 4/1. Bedside bronch -> some blood in trach, posterior wall tissue just distal to trach tip appears to be collapsing on cannula.  - pressure support and rate at night on vent   - ABGs prn  - additional pulm toilet prn -will discuss regimen with RT  - daily CXR -chest x-ray today indicates increased opacities in R mid-lower lung fields. Noticeable bilateral pleural effusion. Will consider POCUS of effusion tomorrow if still present on CXR     ENT: Had epistaxis 3/23, completed afrin bid x3 days. S/p trach on 4/1. Bleeding from trach site 4/2 am, packing placed by ENT. Repeat episode of bloody tracheal secretions 4/3 through this am. Taken back to OR with ENT 4/4 as per above.  -No bleeding from trach site noted     GI: NPO. Shock liver, pancreatitis. Elevated TG. Elevated lactate. Consulted ACS for potential bowel ischemia as cause of persistent lactic acidosis. CT imaging 3/22 with evidence of pancreatitis. No acute surgical indication per ACS. RUQ US 3/22 with thickening of GB wall without cholelithiasis. CT A/P 3/26 negative for acute bleed. LFTs downtrending. Worsening hyperbilirubinemia. Amylase and lipase now WNL. Repeat RUQ US 4/1 with nonspecific gallbladder wall edema and thickening which may be 2/2 generalized fluid overload, mild hepatomegaly with slight nodular contour and c/f steatosis and fibrosis, irregular hypoechoic region adjacent to hepatic veins. US liver with  doppler 4/2 revealed hepatomegaly with nodular contour, mildly elevated RI in main and left hepatic arteries. Hyperbilirubinemia  - OR today for detriment of necrotic tissue 4/11  - Will restart SubQ Hep 6 hours post op and TF post op  - prostat daily  - PPI daily for GI prophy  - Trend LFTs daily -AST/ALT/total bilirubin is approximately similar to prior days.  Per discussion with hepatology yesterday, minor fluctuations are to be expected prior to eventual downtrend     : No history of renal disease. Baseline creatinine 0.6. Anuric RUTH. Elevated CK, downtrending. Metabolic acidosis, total body fluid overload, hyperkalemia. Started on CVVH 3/21, stopped bicarb infusion 3/22. Marino removed 3/24. Hyponatremia resolved. Stopped CVVH 4/2.  Has been tolerating SLED per nephrology recs  - Nephrology following - planning for SLED tonight post op  - RFP BID and PRN  - Replete electrolytes judiciously - RFP to be drawn 4 hours after SLED for repletion of electrolytes  - Bladder scan pending this AM      HEME: Patient was on ppx lovenox for DVT prophylaxis prior to cardiopulmonary arrest. Concern for massive PE patient received bolus of TPA during CPR. Started on heparin infusion overnight given concern for PE. Hemorrhagic shock 3/21, MTP and back to OR s/p Exploration of right thigh woundEvacuation of hematoma. Suture ligation of multiple bleeding vessel and several points in gluteal area. Hematology consulted 3/22 to r/o HLH. Transfused 1 RBC overnight 3/22-3/23. Thrombocytopenia, coagulapathy, hypofibrinogenemia. LE Duplex 3/25 +acute occlusive R soleal DVT. Received 2u PRBC and 1u FFP on 3/26. Heparin infusion discontinued 3/26 to r/o HIT and transitioned to bival. PF4 negative, resumed heparin infuision 3/27. Elevated IL2R and decreased NK function. C/f HLH (low suspicion), empirically treated with decadron (3/28-3/31). Thrombocytopenia on 3/30 to 18k, received 5pk, did not increment. Heparin held. Thrombocytopenia  likely 2/2 to consumptive process, hematology following. Received IVIG and 5pk plts 3/31. Pancytopenia persists, improving thrombocytopenia  - cbc, coags bid and prn  -Hemoglobin stable at 9.6 (prior hemoglobin 9.6)  - Hold DVT in morning to OR - will restart post op  - Held pRBC, FFP, Plt for anticipation of OR on 4/11  - Hematology following, appreciate recs -> maintain Plt count >35k  - ongoing monitoring for s/s bleeding  - close surveillance of RLE and drains  - Vasc Med signed off 3/27  - maintain active T&S (4/8)     ENDO: History of DM2. A1c 7.5%. TSH WNL 1/2024. Hyperglycemia  -Patient receiving sliding scale. Will restart long acting post op once tube feeds are resumed  - q4h accuchecks and SSI #4     ID: Leukocytosis resolved, now with leukopenia. On broad antimicrobial therapy. MRSA screen + 2/28/24. Pan cultures sent 3/22. Sputum NTF, +MRSA screen (completed 5 day course mupirocin), UCx and BCx negative. Completed 7 day course vanc/zosyn 3/27. Febrile to 39.1 4/3, resent blood cultures and BAL and started vanco and zosyn. Switched zosyn to reynaldo, kept on vanco, added john. Blood cultures and sputum with Klebsiella. Repeat blood cultures sent 4/4.  - F/U cultures  - temp q4h, wbc daily  - Per ID - Meropenem dose changed to 1g q8hr with end date 4/14/24 - plan to continue with current plan unless patient has increasing pressor requirements  - ongoing monitoring for s/s infection  - plastics team noted some darkened discoloration of the native tissue at the R posterior hip incision that is c/f necrosis -debridement today  - If continues to be hypotensive consider broaden abx/ add antifungal     Lines:  - right radial a line (4/5)  - RIJ trialysis (3/27)  - PIV x2    Patient seen and discussed with SICU fellow Dr. Barrett and Dr. Josh Dailey

## 2024-04-11 NOTE — PROGRESS NOTES
Jason Hollins is a 63 y.o. male on day 37 of admission presenting with Soft tissue sarcoma (CMS/HCC).      Subjective   4/11:  [facility]. He is seen resting in bed. Family members at bedside. Trach to vent FiO2 40%. No urine output. On some levo 0.03 but BP is high.       Objective          Vitals 24HR  Heart Rate:  []   Temp:  [36.3 °C (97.3 °F)-37.3 °C (99.1 °F)]   Resp:  [0-38]   BP: ()/(46-81)   SpO2:  [94 %-99 %]         Intake/Output last 3 Shifts:    Intake/Output Summary (Last 24 hours) at 4/11/2024 1354  Last data filed at 4/11/2024 1200  Gross per 24 hour   Intake 573.32 ml   Output 390 ml   Net 183.32 ml       Physical Exam  General appearance: intubated, trached  Neck: trach  Heart: RRR  Lungs: FiO2 40% on vent via trach  : no Marino  Neuro: alert, follows commands  Dialysis ACCESS:  right I J trialysis    Current Facility-Administered Medications:     [Transfer Hold] alteplase (Cathflo Activase) injection 2 mg, 2 mg, intra-catheter, PRN, Gracie Skaggs, APRN-CNP    [Transfer Hold] calcium gluconate in NS IV 1 g, 1 g, intravenous, q6h PRN, Gracie Skaggs, APRN-CNP, Stopped at 04/08/24 0335    [Transfer Hold] calcium gluconate in NS IV 2 g, 2 g, intravenous, q6h PRN, Gracie Skaggs, APRN-CNP, Last Rate: 100 mL/hr at 04/04/24 1118, 2 g at 04/04/24 1118    [Transfer Hold] dextrose 50 % injection 25 g, 25 g, intravenous, q15 min PRN **OR** [Transfer Hold] glucagon (Glucagen) injection 1 mg, 1 mg, intramuscular, q15 min PRN, Gracie Skaggs, APRN-CNP    [Transfer Hold] fludrocortisone (Florinef) tablet 0.1 mg, 0.1 mg, nasogastric tube, q12h, PHILLIP Bernal-CNP, 0.1 mg at 04/11/24 1200    [Held by provider] heparin (porcine) injection 5,000 Units, 5,000 Units, subcutaneous, q8h, Mariluz Dailey MD, 5,000 Units at 04/10/24 2047    [Transfer Hold] hydrocortisone sod succ (PF) (Solu-CORTEF) injection 50 mg, 50 mg,  intravenous, q6h, Danilo Torres MD, 50 mg at 04/11/24 0957    [Transfer Hold] insulin lispro (HumaLOG) injection 0-20 Units, 0-20 Units, subcutaneous, q4h, RAQUEL Bernal, 4 Units at 04/11/24 1131    lactated Ringer's infusion, 5 mL/hr, intravenous, Continuous, Gordon Nieves MD, Last Rate: 5 mL/hr at 04/11/24 1234, Continued by Anesthesia at 04/11/24 1234    [Transfer Hold] magnesium sulfate IV 2 g, 2 g, intravenous, q6h PRN, RAQUEL Bernal, Stopped at 03/24/24 1726    [Transfer Hold] magnesium sulfate IV 4 g, 4 g, intravenous, q6h PRN, RAQUEL eBrnal, Stopped at 04/05/24 0838    [Transfer Hold] meropenem (Merrem) in sodium chloride 0.9 % 100 mL IV 1 g, 1 g, intravenous, q8h, Mariluz Dailey MD, Last Rate: 0 mL/hr at 04/11/24 0605, 1 g at 04/11/24 1304    [Transfer Hold] midodrine (Proamatine) tablet 20 mg, 20 mg, orogastric tube, q8h, Mariluz Dailey MD, 20 mg at 04/11/24 0957    norepinephrine (Levophed) 8 mg in dextrose 5% 250 mL (0.032 mg/mL) infusion (premix), 0.01-0.5 mcg/kg/min (Dosing Weight), intravenous, Continuous, Mariluz Dailey MD, Stopped at 04/11/24 1035    oxygen (O2) therapy, , inhalation, Continuous - Inhalation, Mariluz Dailey MD, Rate Verify at 04/11/24 1145    [Transfer Hold] pantoprazole (ProtoNix) injection 40 mg, 40 mg, intravenous, Daily, Alesha Mckeon PA-C, 40 mg at 04/11/24 0957    [Transfer Hold] sodium chloride (Ocean) 0.65 % nasal spray 1 spray, 1 spray, Each Nostril, 4x daily PRN, Osman Perkins MD    Facility-Administered Medications Ordered in Other Encounters:     fentaNYL PF (Sublimaze) injection, , intravenous, PRN, ZOHREH Walters, 25 mcg at 04/11/24 1332    midazolam (Versed) injection, , intravenous, PRN, ZOHREH Walters, 2 mg at 04/11/24 1234    phenylephrine (Clint-Synephrine) 10 mg in sodium chloride 0.9% 250 mL (0.04 mg/mL) infusion (premix), , intravenous, Continuous PRN, Keesha Moore,  APRN-CRNA, Last Rate: 131.25 mL/hr at 04/11/24 1315, 0.7 mcg/kg/min at 04/11/24 1315    phenylephrine HCl in 0.9% NaCl syringe, , intravenous, PRN, Keesha Moore APRN-CRNA, 120 mcg at 04/11/24 1310    rocuronium (ZeMuron) injection, , intravenous, PRN, Keesha Moore APRN-CRNA, 100 mg at 04/11/24 1258      Assessment/Plan   Jason Hollins is a 63 y.o. 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 Hawkins and Dr. Smith on 3/5/24. On 3/20, he developed massive PE and was given TPA, taken to OR in setting of increased swelling at flap site- hematoma for exploration and evacuation. Nephrology following for RUTH.    #Acute Kidney Injury on Dialysis (RUTH-D), anuric  - etiology hemodynamic from hemorrhagic shock  - began on CVVH since 3/21 via R I J trialysis and transitioned to SLED on 4/2  - Cr baseline 0.6  - electrolytes: K and HCO3 WNL, phos 2.8  - no urine output in 24h; I/O: net +0.5L  - FiO2 40% on vent - trach  - on levophed 0.03 but BP high  - s/p SLED 4/7-4/8 - was hypotensive  - s/p SLED 4/9-4/10 with 2L fluid removed    Myxoid Chondrosarcoma s/p wide resection 3/5/2024  Thrombocytopenia  - to go to OR on 4/11 for thigh debridement    Plan  - will plan on SLED tonight after today's OR procedure  - do not check BMP during SLED as electrolytes will not be accurate; instead, check BMP >4h after SLED conclusion  - continue bladder scan once per shift to monitor for renal recovery  - when more hemodynamically stable will eval for transition to iHD    D/w Dr. Yolanda Duffy MD  Nephrology Fellow PGY 5  Contact via secure chat  Nephrology Pager # [phone])

## 2024-04-11 NOTE — ANESTHESIA POSTPROCEDURE EVALUATION
Patient: Jason Hollins    Procedure Summary       Date: 04/11/24 Room / Location: [facility] OR 24 / [facility] OR    Anesthesia Start: 1234 Anesthesia Stop: 1535    Procedure: Wound Debridement w/wo Flap Closure Leg (Right: Buttocks) Diagnosis:       Postoperative wound breakdown, sequela      (Postoperative wound breakdown, sequela [T81.31XS])    Surgeons: Angy Smith MD Responsible Provider: Socrates Patrick MD    Anesthesia Type: general ASA Status: 4            Anesthesia Type: general    Vitals Value Taken Time    04/11/24 1548   Temp 55 04/11/24 1548   Pulse 81 04/11/24 1548   Resp 16 04/11/24 1548   SpO2 96 04/11/24 1548       Anesthesia Post Evaluation    Patient location during evaluation: ICU  Patient participation: complete - patient cannot participate  Level of consciousness: sleepy but conscious and responsive to verbal stimuli  Pain management: adequate  Airway patency: patent  Cardiovascular status: acceptable  Respiratory status: acceptable  Hydration status: acceptable  Postoperative Nausea and Vomiting: none        There were no known notable events for this encounter.

## 2024-04-11 NOTE — PROGRESS NOTES
Department of Plastic and Reconstructive Surgery  Daily Progress Note/Postoperative Check    Patient Name: Jason Hollins  MRN: [id number]  Date:  04/11/24     Subjective   RTOR today with plastic surgery for R gluteal wound I&D, tissue advancement/complex closure and application of incisional wound vac. Pt returned to ICU postoperatively.      Objective   Vitals:    04/11/24 1600   BP:    Pulse:    Resp:    Temp: 36.8 °C (98.2 °F)   SpO2:      Physical Exam   Constitutional: Alert, lying in pressure relieving bed in ICU, appears critically ill.   ENMT: MMM, dobhoff in R nare.  Cardiovascular: RRR on telemetry monitor.  Respiratory/Thorax: Trach to vent.  Gastrointestinal: Abdomen soft, protuberant.   Musculoskeletal: Able to squeeze bilateral hands, wiggles toes on the left.   Extremities: Generalized pitting edema. Right thigh edematous, but soft and compressible, no bruising or tissue induration noted. Visible portion of flap recipient site and adjacent anterior R thigh incisions appear stable. Posterior flap incision line and posterior thigh/gluteal region incision dressed with incisional wound vac, maintaining low continuous suction at -75mmHg, no alarms for leak, obstruction or malfunction, expectantly no output in collecting canister. ANDERSON drain x 3 right upper leg with dark serous outputs.  Neurological: Off sedation, alert and able to respond to commands with head nod.   Skin: Edematous, juandice, warm.     Current Medications  Scheduled medications  albumin human, 12.5 g, intravenous, Once  fludrocortisone, 0.1 mg, nasogastric tube, q12h  heparin (porcine), 5,000 Units, subcutaneous, q8h  hydrocortisone sodium succinate, 50 mg, intravenous, q6h  insulin lispro, 0-20 Units, subcutaneous, q4h  meropenem, 1 g, intravenous, q8h  midodrine, 20 mg, orogastric tube, q8h  oxygen, , inhalation, Continuous - Inhalation  pantoprazole, 40 mg, intravenous, Daily    Continuous medications  lactated Ringer's, 5 mL/hr, Last  Rate: 5 mL/hr (04/11/24 1621)  norepinephrine, 0.01-0.5 mcg/kg/min (Dosing Weight), Last Rate: Stopped (04/11/24 1035)    PRN medications  PRN medications: alteplase, calcium gluconate, calcium gluconate, dextrose **OR** glucagon, magnesium sulfate, magnesium sulfate, sodium chloride     Assessment   Jason Hollins 63 y.o. 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 Hawkins and Dr. Smith on 3/5/24. Postoperative course c/b arrest requiring CPR with ROSC after TPA for assumed large PE on 3/20. Increased swelling at flap site appreciated overnight 3/20-3/21 and pt returned to OR for exploration of R flap site, evacuation of hematoma, suture ligation of multiple bleeding vessel sites in gluteal area and wound closure with Dr. Smith on 3/21. Pt has remained in ICU for continued critical care evaluation and management postoperatively. Returned to OR 4/11 with plastic surgery for R posterior thigh/gluteal region I&D with tissue advancement/complex closure and application of incisional wound vac.     Plan/Recommendations  - No additional acute surgical interventions planned from plastic surgery perspective   - Maintain incisional wound vac to R posterior thigh wound site per plastic surgery x14 days post-op         ·  Settings: -75 mmHg low continuous suction        ·  Monitor/record vac canister output [id number]       ·  Notify plastic surgery with any concerns of leak or obstruction  - Continue ANDERSON drain care to x3 drains present at R thigh flap reconstruction site      ·  Strip drain tubing TID and PRN     ·  Monitor and record output q8h      ·  Keep drain sites C/D/I      ·  Notify plastics if ANDERSON drain output exceeds > 100 ml/hr in one drain or > 300 ml/hr collectively  - Avoid pressure application or positioning onto flap  site or incisions at R upper leg   - Recommend patient be positioned off of R side as able to prevent flap compression/wound breakdown   - Continue clinitron fluidized air mattress  - OK for resume of anticoagulation, DVT ppx x6 hours postoperatively from plastics perspective   - Appreciate remaining supportive care per ICU   - Plastic Surgery will continue to follow     Patient and plan discussed with Dr. Smith.     Gracie Smith PA-C  Plastic and Reconstructive Surgery   Pager #[phone]  Team phones: [phone]

## 2024-04-11 NOTE — PROGRESS NOTES
Physical Therapy                 Therapy Communication Note    Patient Name: Jason Hollins  MRN: [id number]  Today's Date: 4/11/2024     Discipline: Physical Therapy    Missed Visit Reason:  (0841: Discussed pt in ID rounds with team. Pt pending OR this date. Will re-attempt PT post op as able/appropriate.)    Missed Time: Attempt

## 2024-04-12 ENCOUNTER — APPOINTMENT (OUTPATIENT)
Dept: RADIOLOGY | Facility: HOSPITAL | Age: 64
DRG: 003 | End: 2024-04-12
Payer: COMMERCIAL

## 2024-04-12 LAB
ALBUMIN SERPL BCP-MCNC: 2.3 G/DL (ref 3.4–5)
ALBUMIN SERPL BCP-MCNC: 2.4 G/DL (ref 3.4–5)
ALBUMIN SERPL BCP-MCNC: 2.8 G/DL (ref 3.4–5)
ALP SERPL-CCNC: 232 U/L (ref 33–136)
ALT SERPL W P-5'-P-CCNC: 7 U/L (ref 10–52)
ANION GAP BLDA CALCULATED.4IONS-SCNC: 11 MMO/L (ref 10–25)
ANION GAP BLDA CALCULATED.4IONS-SCNC: 12 MMO/L (ref 10–25)
ANION GAP BLDA CALCULATED.4IONS-SCNC: 7 MMO/L (ref 10–25)
ANION GAP SERPL CALC-SCNC: 11 MMOL/L (ref 10–20)
ANION GAP SERPL CALC-SCNC: 11 MMOL/L (ref 10–20)
ANION GAP SERPL CALC-SCNC: 15 MMOL/L (ref 10–20)
APPEARANCE UR: ABNORMAL
APTT PPP: 35 SECONDS (ref 27–38)
AST SERPL W P-5'-P-CCNC: 80 U/L (ref 9–39)
BASE EXCESS BLDA CALC-SCNC: -0.2 MMOL/L (ref -2–3)
BASE EXCESS BLDA CALC-SCNC: 1.2 MMOL/L (ref -2–3)
BASE EXCESS BLDA CALC-SCNC: 3.5 MMOL/L (ref -2–3)
BILIRUB DIRECT SERPL-MCNC: 16.8 MG/DL (ref 0–0.3)
BILIRUB SERPL-MCNC: 24.7 MG/DL (ref 0–1.2)
BILIRUB UR STRIP.AUTO-MCNC: ABNORMAL MG/DL
BODY TEMPERATURE: 37 DEGREES CELSIUS
BUN SERPL-MCNC: 21 MG/DL (ref 6–23)
BUN SERPL-MCNC: 22 MG/DL (ref 6–23)
BUN SERPL-MCNC: 29 MG/DL (ref 6–23)
CA-I BLD-SCNC: 1.05 MMOL/L (ref 1.1–1.33)
CA-I BLD-SCNC: 1.05 MMOL/L (ref 1.1–1.33)
CA-I BLDA-SCNC: 1.09 MMOL/L (ref 1.1–1.33)
CA-I BLDA-SCNC: 1.11 MMOL/L (ref 1.1–1.33)
CA-I BLDA-SCNC: 1.11 MMOL/L (ref 1.1–1.33)
CALCIUM SERPL-MCNC: 7.9 MG/DL (ref 8.6–10.6)
CALCIUM SERPL-MCNC: 7.9 MG/DL (ref 8.6–10.6)
CALCIUM SERPL-MCNC: 8.2 MG/DL (ref 8.6–10.6)
CHLORIDE BLDA-SCNC: 100 MMOL/L (ref 98–107)
CHLORIDE BLDA-SCNC: 98 MMOL/L (ref 98–107)
CHLORIDE BLDA-SCNC: 98 MMOL/L (ref 98–107)
CHLORIDE SERPL-SCNC: 97 MMOL/L (ref 98–107)
CHLORIDE SERPL-SCNC: 98 MMOL/L (ref 98–107)
CHLORIDE SERPL-SCNC: 98 MMOL/L (ref 98–107)
CO2 SERPL-SCNC: 25 MMOL/L (ref 21–32)
CO2 SERPL-SCNC: 28 MMOL/L (ref 21–32)
CO2 SERPL-SCNC: 28 MMOL/L (ref 21–32)
COLOR UR: ABNORMAL
CREAT SERPL-MCNC: 0.97 MG/DL (ref 0.5–1.3)
CREAT SERPL-MCNC: 0.98 MG/DL (ref 0.5–1.3)
CREAT SERPL-MCNC: 1.3 MG/DL (ref 0.5–1.3)
EGFRCR SERPLBLD CKD-EPI 2021: 62 ML/MIN/1.73M*2
EGFRCR SERPLBLD CKD-EPI 2021: 87 ML/MIN/1.73M*2
EGFRCR SERPLBLD CKD-EPI 2021: 88 ML/MIN/1.73M*2
ERYTHROCYTE [DISTWIDTH] IN BLOOD BY AUTOMATED COUNT: 19.8 % (ref 11.5–14.5)
GLUCOSE BLD MANUAL STRIP-MCNC: 150 MG/DL (ref 74–99)
GLUCOSE BLD MANUAL STRIP-MCNC: 154 MG/DL (ref 74–99)
GLUCOSE BLD MANUAL STRIP-MCNC: 178 MG/DL (ref 74–99)
GLUCOSE BLD MANUAL STRIP-MCNC: 185 MG/DL (ref 74–99)
GLUCOSE BLD MANUAL STRIP-MCNC: 189 MG/DL (ref 74–99)
GLUCOSE BLD MANUAL STRIP-MCNC: 196 MG/DL (ref 74–99)
GLUCOSE BLD MANUAL STRIP-MCNC: 201 MG/DL (ref 74–99)
GLUCOSE BLD MANUAL STRIP-MCNC: 220 MG/DL (ref 74–99)
GLUCOSE BLDA-MCNC: 168 MG/DL (ref 74–99)
GLUCOSE BLDA-MCNC: 188 MG/DL (ref 74–99)
GLUCOSE BLDA-MCNC: 191 MG/DL (ref 74–99)
GLUCOSE SERPL-MCNC: 167 MG/DL (ref 74–99)
GLUCOSE SERPL-MCNC: 186 MG/DL (ref 74–99)
GLUCOSE SERPL-MCNC: 186 MG/DL (ref 74–99)
GLUCOSE UR STRIP.AUTO-MCNC: ABNORMAL MG/DL
HCO3 BLDA-SCNC: 23.9 MMOL/L (ref 22–26)
HCO3 BLDA-SCNC: 24.9 MMOL/L (ref 22–26)
HCO3 BLDA-SCNC: 27.8 MMOL/L (ref 22–26)
HCT VFR BLD AUTO: 27.6 % (ref 41–52)
HCT VFR BLD EST: 30 % (ref 41–52)
HCT VFR BLD EST: 31 % (ref 41–52)
HCT VFR BLD EST: 31 % (ref 41–52)
HGB BLD-MCNC: 9.9 G/DL (ref 13.5–17.5)
HGB BLDA-MCNC: 10.1 G/DL (ref 13.5–17.5)
HGB BLDA-MCNC: 10.4 G/DL (ref 13.5–17.5)
HGB BLDA-MCNC: 10.4 G/DL (ref 13.5–17.5)
INHALED O2 CONCENTRATION: 30 %
INHALED O2 CONCENTRATION: 40 %
INHALED O2 CONCENTRATION: 40 %
INR PPP: 1.3 (ref 0.9–1.1)
KETONES UR STRIP.AUTO-MCNC: NEGATIVE MG/DL
LACTATE BLDA-SCNC: 1.5 MMOL/L (ref 0.4–2)
LACTATE BLDA-SCNC: 1.9 MMOL/L (ref 0.4–2)
LACTATE BLDA-SCNC: 2.6 MMOL/L (ref 0.4–2)
LEUKOCYTE ESTERASE UR QL STRIP.AUTO: ABNORMAL
MAGNESIUM SERPL-MCNC: 2.01 MG/DL (ref 1.6–2.4)
MCH RBC QN AUTO: 27.6 PG (ref 26–34)
MCHC RBC AUTO-ENTMCNC: 35.9 G/DL (ref 32–36)
MCV RBC AUTO: 77 FL (ref 80–100)
NITRITE UR QL STRIP.AUTO: NEGATIVE
NRBC BLD-RTO: 0.2 /100 WBCS (ref 0–0)
OXYHGB MFR BLDA: 88.7 % (ref 94–98)
OXYHGB MFR BLDA: 90.3 % (ref 94–98)
OXYHGB MFR BLDA: 96.2 % (ref 94–98)
PCO2 BLDA: 35 MM HG (ref 38–42)
PCO2 BLDA: 36 MM HG (ref 38–42)
PCO2 BLDA: 40 MM HG (ref 38–42)
PH BLDA: 7.43 PH (ref 7.38–7.42)
PH BLDA: 7.45 PH (ref 7.38–7.42)
PH BLDA: 7.46 PH (ref 7.38–7.42)
PH UR STRIP.AUTO: 6.5 [PH]
PHOSPHATE SERPL-MCNC: 2.2 MG/DL (ref 2.5–4.9)
PHOSPHATE SERPL-MCNC: 2.7 MG/DL (ref 2.5–4.9)
PLATELET # BLD AUTO: 90 X10*3/UL (ref 150–450)
PO2 BLDA: 149 MM HG (ref 85–95)
PO2 BLDA: 60 MM HG (ref 85–95)
PO2 BLDA: 63 MM HG (ref 85–95)
POTASSIUM BLDA-SCNC: 3.2 MMOL/L (ref 3.5–5.3)
POTASSIUM BLDA-SCNC: 3.3 MMOL/L (ref 3.5–5.3)
POTASSIUM BLDA-SCNC: 3.5 MMOL/L (ref 3.5–5.3)
POTASSIUM SERPL-SCNC: 3.3 MMOL/L (ref 3.5–5.3)
POTASSIUM SERPL-SCNC: 3.4 MMOL/L (ref 3.5–5.3)
POTASSIUM SERPL-SCNC: 3.5 MMOL/L (ref 3.5–5.3)
PROT SERPL-MCNC: 5.2 G/DL (ref 6.4–8.2)
PROT UR STRIP.AUTO-MCNC: ABNORMAL MG/DL
PROTHROMBIN TIME: 15.2 SECONDS (ref 9.8–12.8)
RBC # BLD AUTO: 3.59 X10*6/UL (ref 4.5–5.9)
RBC # UR STRIP.AUTO: ABNORMAL /UL
RBC #/AREA URNS AUTO: >20 /HPF
SAO2 % BLDA: 100 % (ref 94–100)
SAO2 % BLDA: 92 % (ref 94–100)
SAO2 % BLDA: 94 % (ref 94–100)
SODIUM BLDA-SCNC: 129 MMOL/L (ref 136–145)
SODIUM BLDA-SCNC: 131 MMOL/L (ref 136–145)
SODIUM BLDA-SCNC: 132 MMOL/L (ref 136–145)
SODIUM SERPL-SCNC: 133 MMOL/L (ref 136–145)
SODIUM SERPL-SCNC: 134 MMOL/L (ref 136–145)
SODIUM SERPL-SCNC: 134 MMOL/L (ref 136–145)
SP GR UR STRIP.AUTO: 1.01
UROBILINOGEN UR STRIP.AUTO-MCNC: ABNORMAL MG/DL
WBC # BLD AUTO: 8.3 X10*3/UL (ref 4.4–11.3)
WBC #/AREA URNS AUTO: >50 /HPF

## 2024-04-12 PROCEDURE — 2500000004 HC RX 250 GENERAL PHARMACY W/ HCPCS (ALT 636 FOR OP/ED): Performed by: STUDENT IN AN ORGANIZED HEALTH CARE EDUCATION/TRAINING PROGRAM

## 2024-04-12 PROCEDURE — 81001 URINALYSIS AUTO W/SCOPE: CPT | Performed by: NURSE PRACTITIONER

## 2024-04-12 PROCEDURE — 2020000001 HC ICU ROOM DAILY

## 2024-04-12 PROCEDURE — 37799 UNLISTED PX VASCULAR SURGERY: CPT | Performed by: STUDENT IN AN ORGANIZED HEALTH CARE EDUCATION/TRAINING PROGRAM

## 2024-04-12 PROCEDURE — 84132 ASSAY OF SERUM POTASSIUM: CPT | Performed by: STUDENT IN AN ORGANIZED HEALTH CARE EDUCATION/TRAINING PROGRAM

## 2024-04-12 PROCEDURE — C9113 INJ PANTOPRAZOLE SODIUM, VIA: HCPCS | Performed by: STUDENT IN AN ORGANIZED HEALTH CARE EDUCATION/TRAINING PROGRAM

## 2024-04-12 PROCEDURE — 2500000005 HC RX 250 GENERAL PHARMACY W/O HCPCS: Performed by: STUDENT IN AN ORGANIZED HEALTH CARE EDUCATION/TRAINING PROGRAM

## 2024-04-12 PROCEDURE — 99232 SBSQ HOSP IP/OBS MODERATE 35: CPT

## 2024-04-12 PROCEDURE — 85027 COMPLETE CBC AUTOMATED: CPT | Performed by: STUDENT IN AN ORGANIZED HEALTH CARE EDUCATION/TRAINING PROGRAM

## 2024-04-12 PROCEDURE — P9047 ALBUMIN (HUMAN), 25%, 50ML: HCPCS | Mod: JZ | Performed by: PHYSICIAN ASSISTANT

## 2024-04-12 PROCEDURE — 82330 ASSAY OF CALCIUM: CPT | Performed by: STUDENT IN AN ORGANIZED HEALTH CARE EDUCATION/TRAINING PROGRAM

## 2024-04-12 PROCEDURE — 99233 SBSQ HOSP IP/OBS HIGH 50: CPT | Performed by: INTERNAL MEDICINE

## 2024-04-12 PROCEDURE — 99231 SBSQ HOSP IP/OBS SF/LOW 25: CPT | Performed by: STUDENT IN AN ORGANIZED HEALTH CARE EDUCATION/TRAINING PROGRAM

## 2024-04-12 PROCEDURE — 74018 RADEX ABDOMEN 1 VIEW: CPT | Performed by: RADIOLOGY

## 2024-04-12 PROCEDURE — 2500000004 HC RX 250 GENERAL PHARMACY W/ HCPCS (ALT 636 FOR OP/ED): Mod: JZ | Performed by: PHYSICIAN ASSISTANT

## 2024-04-12 PROCEDURE — 82947 ASSAY GLUCOSE BLOOD QUANT: CPT

## 2024-04-12 PROCEDURE — 74018 RADEX ABDOMEN 1 VIEW: CPT

## 2024-04-12 PROCEDURE — 2500000001 HC RX 250 WO HCPCS SELF ADMINISTERED DRUGS (ALT 637 FOR MEDICARE OP): Performed by: STUDENT IN AN ORGANIZED HEALTH CARE EDUCATION/TRAINING PROGRAM

## 2024-04-12 PROCEDURE — 82248 BILIRUBIN DIRECT: CPT | Performed by: STUDENT IN AN ORGANIZED HEALTH CARE EDUCATION/TRAINING PROGRAM

## 2024-04-12 PROCEDURE — 71045 X-RAY EXAM CHEST 1 VIEW: CPT

## 2024-04-12 PROCEDURE — 51701 INSERT BLADDER CATHETER: CPT

## 2024-04-12 PROCEDURE — 2500000002 HC RX 250 W HCPCS SELF ADMINISTERED DRUGS (ALT 637 FOR MEDICARE OP, ALT 636 FOR OP/ED): Performed by: STUDENT IN AN ORGANIZED HEALTH CARE EDUCATION/TRAINING PROGRAM

## 2024-04-12 PROCEDURE — 87086 URINE CULTURE/COLONY COUNT: CPT | Performed by: NURSE PRACTITIONER

## 2024-04-12 PROCEDURE — 94003 VENT MGMT INPAT SUBQ DAY: CPT

## 2024-04-12 PROCEDURE — 99291 CRITICAL CARE FIRST HOUR: CPT | Performed by: STUDENT IN AN ORGANIZED HEALTH CARE EDUCATION/TRAINING PROGRAM

## 2024-04-12 PROCEDURE — 83735 ASSAY OF MAGNESIUM: CPT | Performed by: STUDENT IN AN ORGANIZED HEALTH CARE EDUCATION/TRAINING PROGRAM

## 2024-04-12 PROCEDURE — 71045 X-RAY EXAM CHEST 1 VIEW: CPT | Performed by: RADIOLOGY

## 2024-04-12 PROCEDURE — 85610 PROTHROMBIN TIME: CPT | Performed by: STUDENT IN AN ORGANIZED HEALTH CARE EDUCATION/TRAINING PROGRAM

## 2024-04-12 RX ORDER — INSULIN GLARGINE 100 [IU]/ML
8 INJECTION, SOLUTION SUBCUTANEOUS EVERY 12 HOURS
Status: DISCONTINUED | OUTPATIENT
Start: 2024-04-12 | End: 2024-04-12

## 2024-04-12 RX ORDER — ALBUMIN HUMAN 250 G/1000ML
25 SOLUTION INTRAVENOUS EVERY 6 HOURS
Status: COMPLETED | OUTPATIENT
Start: 2024-04-12 | End: 2024-04-13

## 2024-04-12 RX ORDER — POTASSIUM CHLORIDE 14.9 MG/ML
20 INJECTION INTRAVENOUS
Status: COMPLETED | OUTPATIENT
Start: 2024-04-12 | End: 2024-04-12

## 2024-04-12 RX ORDER — HYDROMORPHONE HYDROCHLORIDE 1 MG/ML
0.2 INJECTION, SOLUTION INTRAMUSCULAR; INTRAVENOUS; SUBCUTANEOUS
Status: DISCONTINUED | OUTPATIENT
Start: 2024-04-12 | End: 2024-04-17

## 2024-04-12 RX ORDER — INSULIN GLARGINE 100 [IU]/ML
16 INJECTION, SOLUTION SUBCUTANEOUS EVERY 12 HOURS
Status: DISCONTINUED | OUTPATIENT
Start: 2024-04-12 | End: 2024-04-14

## 2024-04-12 RX ORDER — HYDROMORPHONE HYDROCHLORIDE 1 MG/ML
0.4 INJECTION, SOLUTION INTRAMUSCULAR; INTRAVENOUS; SUBCUTANEOUS EVERY 4 HOURS PRN
Status: DISCONTINUED | OUTPATIENT
Start: 2024-04-12 | End: 2024-04-15

## 2024-04-12 RX ORDER — OXYCODONE HYDROCHLORIDE 5 MG/1
5 TABLET ORAL EVERY 6 HOURS
Status: DISCONTINUED | OUTPATIENT
Start: 2024-04-12 | End: 2024-04-17

## 2024-04-12 RX ADMIN — HYDROCORTISONE SODIUM SUCCINATE 50 MG: 100 INJECTION, POWDER, FOR SOLUTION INTRAMUSCULAR; INTRAVENOUS at 22:07

## 2024-04-12 RX ADMIN — FLUDROCORTISONE ACETATE 0.1 MG: 0.1 TABLET ORAL at 12:02

## 2024-04-12 RX ADMIN — INSULIN GLARGINE 16 UNITS: 100 INJECTION, SOLUTION SUBCUTANEOUS at 08:35

## 2024-04-12 RX ADMIN — POTASSIUM CHLORIDE 20 MEQ: 14.9 INJECTION, SOLUTION INTRAVENOUS at 09:28

## 2024-04-12 RX ADMIN — FLUDROCORTISONE ACETATE 0.1 MG: 0.1 TABLET ORAL at 03:47

## 2024-04-12 RX ADMIN — MIDODRINE HYDROCHLORIDE 20 MG: 10 TABLET ORAL at 03:47

## 2024-04-12 RX ADMIN — CALCIUM GLUCONATE 0.5 G: 98 INJECTION, SOLUTION INTRAVENOUS at 10:28

## 2024-04-12 RX ADMIN — HEPARIN SODIUM 5000 UNITS: 5000 INJECTION INTRAVENOUS; SUBCUTANEOUS at 05:39

## 2024-04-12 RX ADMIN — HYDROCORTISONE SODIUM SUCCINATE 50 MG: 100 INJECTION, POWDER, FOR SOLUTION INTRAMUSCULAR; INTRAVENOUS at 09:45

## 2024-04-12 RX ADMIN — INSULIN GLARGINE 16 UNITS: 100 INJECTION, SOLUTION SUBCUTANEOUS at 20:44

## 2024-04-12 RX ADMIN — INSULIN LISPRO 4 UNITS: 100 INJECTION, SOLUTION INTRAVENOUS; SUBCUTANEOUS at 03:48

## 2024-04-12 RX ADMIN — HYDROMORPHONE HYDROCHLORIDE 0.2 MG: 1 INJECTION, SOLUTION INTRAMUSCULAR; INTRAVENOUS; SUBCUTANEOUS at 08:57

## 2024-04-12 RX ADMIN — MIDODRINE HYDROCHLORIDE 20 MG: 10 TABLET ORAL at 17:23

## 2024-04-12 RX ADMIN — ALBUMIN HUMAN 25 G: 0.25 SOLUTION INTRAVENOUS at 23:34

## 2024-04-12 RX ADMIN — HYDROCORTISONE SODIUM SUCCINATE 50 MG: 100 INJECTION, POWDER, FOR SOLUTION INTRAMUSCULAR; INTRAVENOUS at 16:21

## 2024-04-12 RX ADMIN — SODIUM CHLORIDE 15 MMOL: 9 INJECTION, SOLUTION INTRAVENOUS at 14:00

## 2024-04-12 RX ADMIN — ALBUMIN HUMAN 25 G: 0.25 SOLUTION INTRAVENOUS at 11:07

## 2024-04-12 RX ADMIN — PANTOPRAZOLE SODIUM 40 MG: 40 INJECTION, POWDER, FOR SOLUTION INTRAVENOUS at 08:27

## 2024-04-12 RX ADMIN — HEPARIN SODIUM 5000 UNITS: 5000 INJECTION INTRAVENOUS; SUBCUTANEOUS at 14:00

## 2024-04-12 RX ADMIN — OXYCODONE HYDROCHLORIDE 5 MG: 5 TABLET ORAL at 18:26

## 2024-04-12 RX ADMIN — MIDODRINE HYDROCHLORIDE 20 MG: 10 TABLET ORAL at 09:28

## 2024-04-12 RX ADMIN — POTASSIUM CHLORIDE 20 MEQ: 14.9 INJECTION, SOLUTION INTRAVENOUS at 12:02

## 2024-04-12 RX ADMIN — INSULIN LISPRO 8 UNITS: 100 INJECTION, SOLUTION INTRAVENOUS; SUBCUTANEOUS at 14:00

## 2024-04-12 RX ADMIN — HEPARIN SODIUM 5000 UNITS: 5000 INJECTION INTRAVENOUS; SUBCUTANEOUS at 22:07

## 2024-04-12 RX ADMIN — Medication 1 G: at 14:00

## 2024-04-12 RX ADMIN — INSULIN LISPRO 4 UNITS: 100 INJECTION, SOLUTION INTRAVENOUS; SUBCUTANEOUS at 20:44

## 2024-04-12 RX ADMIN — INSULIN LISPRO 4 UNITS: 100 INJECTION, SOLUTION INTRAVENOUS; SUBCUTANEOUS at 16:22

## 2024-04-12 RX ADMIN — HYDROCORTISONE SODIUM SUCCINATE 50 MG: 100 INJECTION, POWDER, FOR SOLUTION INTRAMUSCULAR; INTRAVENOUS at 03:48

## 2024-04-12 RX ADMIN — HYDROMORPHONE HYDROCHLORIDE 0.4 MG: 1 INJECTION, SOLUTION INTRAMUSCULAR; INTRAVENOUS; SUBCUTANEOUS at 10:39

## 2024-04-12 RX ADMIN — Medication 1 G: at 06:00

## 2024-04-12 RX ADMIN — Medication 1 G: at 22:20

## 2024-04-12 RX ADMIN — OXYCODONE HYDROCHLORIDE 5 MG: 5 TABLET ORAL at 23:34

## 2024-04-12 RX ADMIN — INSULIN LISPRO 4 UNITS: 100 INJECTION, SOLUTION INTRAVENOUS; SUBCUTANEOUS at 08:27

## 2024-04-12 RX ADMIN — ALBUMIN HUMAN 25 G: 0.25 SOLUTION INTRAVENOUS at 16:21

## 2024-04-12 RX ADMIN — Medication: at 08:00

## 2024-04-12 ASSESSMENT — PAIN - FUNCTIONAL ASSESSMENT
PAIN_FUNCTIONAL_ASSESSMENT: CPOT (CRITICAL CARE PAIN OBSERVATION TOOL)
PAIN_FUNCTIONAL_ASSESSMENT: 0-10
PAIN_FUNCTIONAL_ASSESSMENT: 0-10
PAIN_FUNCTIONAL_ASSESSMENT: CPOT (CRITICAL CARE PAIN OBSERVATION TOOL)
PAIN_FUNCTIONAL_ASSESSMENT: CPOT (CRITICAL CARE PAIN OBSERVATION TOOL)

## 2024-04-12 ASSESSMENT — PAIN SCALES - GENERAL
PAINLEVEL_OUTOF10: 3
PAINLEVEL_OUTOF10: 0 - NO PAIN

## 2024-04-12 NOTE — PROGRESS NOTES
"  Department of Plastic and Reconstructive Surgery  Daily Progress Note    Patient Name: Jason Hollins  MRN: [id number]  Date:  04/12/24     Subjective  Found resting in TSICU. Family at bedside. Responsive to commands and shaking head appropriately. Trach with ventilator support    Overnight Events  R gluteal wound I&D, tissue advancement and complex closure with wound vac placement yesterday with Dr. Smith. Remains critically ill but stable in TSICU.     Objective    Vital Signs  /56   Pulse 62   Temp 36.1 °C (97 °F)   Resp (!) 0   Ht 1.778 m (5' 10\")   Wt 126 kg (277 lb 9 oz)   SpO2 97%   BMI 39.83 kg/m²      Physical Exam   Constitutional: Alert, lying in pressure relieving bed in ICU, appears critically ill.   ENMT: MMM, dobhoff in R nare.  Cardiovascular: RRR on telemetry monitor.  Respiratory/Thorax: Trach to vent.  Gastrointestinal: Abdomen soft, protuberant.   Musculoskeletal: Able to squeeze bilateral hands, wiggles toes on the left.   Extremities: Generalized pitting edema. Right thigh edematous, but soft and compressible, no bruising or tissue induration noted. Visible portion of flap recipient site and adjacent anterior R thigh incisions appear stable. Posterior flap incision line and posterior thigh/gluteal region incision dressed with incisional wound vac, maintaining low continuous suction at -75mmHg, no alarms for leak, obstruction or malfunction, expectantly no output in collecting canister. ANDERSON drain x 3 right upper leg with dark serous outputs.  Neurological: Off sedation, alert and able to respond to commands with head nod.   Skin: Edematous, juandice, warm.   Diagnostics   Results for orders placed or performed during the hospital encounter of 03/05/24 (from the past 24 hour(s))   POCT GLUCOSE   Result Value Ref Range    POCT Glucose 161 (H) 74 - 99 mg/dL   Tissue/Wound Culture/Smear    Specimen: ABSCESS; Tissue   Result Value Ref Range    Gram Stain (1+) Rare Polymorphonuclear " leukocytes     Gram Stain No organisms seen    CBC   Result Value Ref Range    WBC 10.7 4.4 - 11.3 x10*3/uL    nRBC 0.0 0.0 - 0.0 /100 WBCs    RBC 3.31 (L) 4.50 - 5.90 x10*6/uL    Hemoglobin 9.7 (L) 13.5 - 17.5 g/dL    Hematocrit 26.0 (L) 41.0 - 52.0 %    MCV 79 (L) 80 - 100 fL    MCH 29.3 26.0 - 34.0 pg    MCHC 37.3 (H) 32.0 - 36.0 g/dL    RDW 19.5 (H) 11.5 - 14.5 %    Platelets 85 (L) 150 - 450 x10*3/uL   Magnesium   Result Value Ref Range    Magnesium 2.25 1.60 - 2.40 mg/dL   Coagulation Screen   Result Value Ref Range    Protime 14.9 (H) 9.8 - 12.8 seconds    INR 1.3 (H) 0.9 - 1.1    aPTT 34 27 - 38 seconds   Renal Function Panel   Result Value Ref Range    Glucose 182 (H) 74 - 99 mg/dL    Sodium 133 (L) 136 - 145 mmol/L    Potassium 4.1 3.5 - 5.3 mmol/L    Chloride 98 98 - 107 mmol/L    Bicarbonate 25 21 - 32 mmol/L    Anion Gap 14 10 - 20 mmol/L    Urea Nitrogen 44 (H) 6 - 23 mg/dL    Creatinine 1.79 (H) 0.50 - 1.30 mg/dL    eGFR 42 (L) >60 mL/min/1.73m*2    Calcium 7.9 (L) 8.6 - 10.6 mg/dL    Phosphorus 4.7 2.5 - 4.9 mg/dL    Albumin 2.3 (L) 3.4 - 5.0 g/dL   Blood Gas Arterial Full Panel   Result Value Ref Range    POCT pH, Arterial 7.30 (L) 7.38 - 7.42 pH    POCT pCO2, Arterial 49 (H) 38 - 42 mm Hg    POCT pO2, Arterial 85 85 - 95 mm Hg    POCT SO2, Arterial 97 94 - 100 %    POCT Oxy Hemoglobin, Arterial 94.0 94.0 - 98.0 %    POCT Hematocrit Calculated, Arterial 30.0 (L) 41.0 - 52.0 %    POCT Sodium, Arterial 129 (L) 136 - 145 mmol/L    POCT Potassium, Arterial 4.0 3.5 - 5.3 mmol/L    POCT Chloride, Arterial 99 98 - 107 mmol/L    POCT Ionized Calcium, Arterial 1.10 1.10 - 1.33 mmol/L    POCT Glucose, Arterial 185 (H) 74 - 99 mg/dL    POCT Lactate, Arterial 1.9 0.4 - 2.0 mmol/L    POCT Base Excess, Arterial -2.5 (L) -2.0 - 3.0 mmol/L    POCT HCO3 Calculated, Arterial 24.1 22.0 - 26.0 mmol/L    POCT Hemoglobin, Arterial 9.9 (L) 13.5 - 17.5 g/dL    POCT Anion Gap, Arterial 10 10 - 25 mmo/L    Patient  Temperature 37.0 degrees Celsius    FiO2 40 %   CALCIUM, IONIZED   Result Value Ref Range    POCT Calcium, Ionized 1.09 (L) 1.1 - 1.33 mmol/L   POCT GLUCOSE   Result Value Ref Range    POCT Glucose 172 (H) 74 - 99 mg/dL   POCT GLUCOSE   Result Value Ref Range    POCT Glucose 196 (H) 74 - 99 mg/dL   POCT GLUCOSE   Result Value Ref Range    POCT Glucose 150 (H) 74 - 99 mg/dL   POCT GLUCOSE   Result Value Ref Range    POCT Glucose 154 (H) 74 - 99 mg/dL   POCT GLUCOSE   Result Value Ref Range    POCT Glucose 196 (H) 74 - 99 mg/dL   Blood Gas Arterial Full Panel   Result Value Ref Range    POCT pH, Arterial 7.45 (H) 7.38 - 7.42 pH    POCT pCO2, Arterial 40 38 - 42 mm Hg    POCT pO2, Arterial 149 (H) 85 - 95 mm Hg    POCT SO2, Arterial 100 94 - 100 %    POCT Oxy Hemoglobin, Arterial 96.2 94.0 - 98.0 %    POCT Hematocrit Calculated, Arterial 31.0 (L) 41.0 - 52.0 %    POCT Sodium, Arterial 129 (L) 136 - 145 mmol/L    POCT Potassium, Arterial 3.3 (L) 3.5 - 5.3 mmol/L    POCT Chloride, Arterial 98 98 - 107 mmol/L    POCT Ionized Calcium, Arterial 1.11 1.10 - 1.33 mmol/L    POCT Glucose, Arterial 191 (H) 74 - 99 mg/dL    POCT Lactate, Arterial 1.5 0.4 - 2.0 mmol/L    POCT Base Excess, Arterial 3.5 (H) -2.0 - 3.0 mmol/L    POCT HCO3 Calculated, Arterial 27.8 (H) 22.0 - 26.0 mmol/L    POCT Hemoglobin, Arterial 10.4 (L) 13.5 - 17.5 g/dL    POCT Anion Gap, Arterial 7 (L) 10 - 25 mmo/L    Patient Temperature 37.0 degrees Celsius    FiO2 30 %       Current Medications  Scheduled medications  fludrocortisone, 0.1 mg, nasogastric tube, q12h  heparin (porcine), 5,000 Units, subcutaneous, q8h  hydrocortisone sodium succinate, 50 mg, intravenous, q6h  insulin glargine, 16 Units, subcutaneous, q12h  insulin lispro, 0-20 Units, subcutaneous, q4h  meropenem, 1 g, intravenous, q8h  midodrine, 20 mg, orogastric tube, q8h  oxygen, , inhalation, Continuous - Inhalation  pantoprazole, 40 mg, intravenous, Daily      Continuous  medications  lactated Ringer's, 5 mL/hr, Last Rate: 5 mL/hr (04/11/24 2000)  norepinephrine, 0.01-0.5 mcg/kg/min (Dosing Weight), Last Rate: 0.01 mcg/kg/min (04/12/24 0530)      PRN medications  PRN medications: alteplase, calcium gluconate, calcium gluconate, dextrose, glucagon, magnesium sulfate, magnesium sulfate, sodium chloride     Assessment  Jason Hollins 63 y.o. 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 Hawkins and Dr. Smith on 3/5/24. Postoperative course c/b arrest requiring CPR with ROSC after TPA for assumed large PE on 3/20. Increased swelling at flap site appreciated overnight 3/20-3/21 and pt returned to OR for exploration of R flap site, evacuation of hematoma, suture ligation of multiple bleeding vessel sites in gluteal area and wound closure with Dr. Smith on 3/21. Pt has remained in ICU for continued critical care evaluation and management postoperatively. Returned to OR 4/11 with plastic surgery for R posterior thigh/gluteal region I&D with tissue advancement/complex closure and application of incisional wound vac.      Plan/Recommendations  - No additional acute surgical interventions planned from plastic surgery perspective   - Maintain incisional wound vac to R posterior thigh wound site per plastic surgery x14 (4/25) days post-op         ·  Settings: -75 mmHg low continuous suction        ·  Monitor/record vac canister output [id number]       ·  Notify plastic surgery with any concerns of leak or obstruction  - Continue ANDERSON drain care to x3 drains present at R thigh flap reconstruction site      ·  Strip drain tubing TID and PRN     ·  Monitor and record output q8h      ·  Keep drain sites C/D/I      ·  Notify plastics if ANDERSON drain output exceeds > 100 ml/hr in one drain or > 300 ml/hr  collectively  - Avoid pressure application or positioning onto flap site or incisions at R upper leg   - Recommend patient be positioned off of R side as able to prevent flap compression/wound breakdown   - Continue clinitron fluidized air mattress  - OK for resume of anticoagulation, DVT ppx x6 hours postoperatively from plastics perspective   - Appreciate remaining supportive care per ICU   - Plastic Surgery will continue to follow      Patient and plan discussed with Dr. Smith.      PHILLIP Dalal-CNP  Plastic and Reconstructive Surgery   [place]  Pager #[phone]  Team phones: [phone]

## 2024-04-12 NOTE — PROGRESS NOTES
"Jason Hollins is a 63 y.o. male on day 38 of admission presenting with Soft tissue sarcoma (Multi).    Subjective   Overnight, SLED removed 2L. Patient remained on Levo 0.01 for BP support. Surgical site checked and has persistent swelling which is compressible.    This morning, patient states he has post-op pain and abdominal pain. Denies chest pain or shortness of breath. No associated nausea/vomiting. He has had 2 BM in past 12 hours. Bladder scan 100cc and patient has been anuric. Abdominal pain is L > R sided.       Objective     Physical Exam  Constitutional:       General: He is not in acute distress.  Eyes:      Extraocular Movements: Extraocular movements intact.   Cardiovascular:      Rate and Rhythm: Normal rate and regular rhythm.   Pulmonary:      Comments: Inspiratory crackles and diminished breath sounds noted bilaterally  Abdominal:      General: There is no distension.      Tenderness: There is no guarding.      Comments: Soft, non distended, mildly tender to palpation (L > R), normoactive bowel sounds   Musculoskeletal:      Comments: 2+ pitting edema bilaterally. Surgical sites with mild swelling, compressible, no significant erythema. Wound vac and 3 ANDERSON drains in place without significant surrounding erythema   Skin:     General: Skin is warm.   Neurological:      Mental Status: He is alert.      Comments: Alert and responsive to commands. Able to move upper extremities and LLE (similar to baseline)         Last Recorded Vitals  Blood pressure 122/56, pulse 62, temperature 36.1 °C (97 °F), resp. rate (!) 0, height 1.778 m (5' 10\"), weight 126 kg (277 lb 9 oz), SpO2 97%.  Intake/Output last 3 Shifts:  I/O last 3 completed shifts:  In: 1003.6 (8 mL/kg) [I.V.:133.6 (1.1 mL/kg); NG/GT:720; IV Piggyback:150]  Out: 2285 (18.1 mL/kg) [Emesis/NG output:45; Drains:540; Other:1700]  Weight: 125.9 kg     Relevant Results  Scheduled medications  calcium gluconate, 0.5 g, intravenous, Once  fludrocortisone, 0.1 " mg, nasogastric tube, q12h  heparin (porcine), 5,000 Units, subcutaneous, q8h  hydrocortisone sodium succinate, 50 mg, intravenous, q6h  insulin glargine, 16 Units, subcutaneous, q12h  insulin lispro, 0-20 Units, subcutaneous, q4h  meropenem, 1 g, intravenous, q8h  midodrine, 20 mg, orogastric tube, q8h  oxygen, , inhalation, Continuous - Inhalation  pantoprazole, 40 mg, intravenous, Daily      Continuous medications  lactated Ringer's, 5 mL/hr, Last Rate: 5 mL/hr (04/11/24 2000)  norepinephrine, 0.01-0.5 mcg/kg/min (Dosing Weight), Last Rate: 0.01 mcg/kg/min (04/12/24 0530)      PRN medications  PRN medications: alteplase, calcium gluconate, calcium gluconate, dextrose, glucagon, HYDROmorphone, HYDROmorphone, magnesium sulfate, magnesium sulfate, sodium chloride  Results for orders placed or performed during the hospital encounter of 03/05/24 (from the past 24 hour(s))   POCT GLUCOSE   Result Value Ref Range    POCT Glucose 161 (H) 74 - 99 mg/dL   Fungal Culture/Smear    Specimen: ABSCESS; Tissue   Result Value Ref Range    Fungal Culture/Smear       Culture in progress, a report will be issued when positive or after 2 weeks of incubation.   Tissue/Wound Culture/Smear    Specimen: ABSCESS; Tissue   Result Value Ref Range    Gram Stain (1+) Rare Polymorphonuclear leukocytes     Gram Stain No organisms seen    CBC   Result Value Ref Range    WBC 10.7 4.4 - 11.3 x10*3/uL    nRBC 0.0 0.0 - 0.0 /100 WBCs    RBC 3.31 (L) 4.50 - 5.90 x10*6/uL    Hemoglobin 9.7 (L) 13.5 - 17.5 g/dL    Hematocrit 26.0 (L) 41.0 - 52.0 %    MCV 79 (L) 80 - 100 fL    MCH 29.3 26.0 - 34.0 pg    MCHC 37.3 (H) 32.0 - 36.0 g/dL    RDW 19.5 (H) 11.5 - 14.5 %    Platelets 85 (L) 150 - 450 x10*3/uL   Magnesium   Result Value Ref Range    Magnesium 2.25 1.60 - 2.40 mg/dL   Coagulation Screen   Result Value Ref Range    Protime 14.9 (H) 9.8 - 12.8 seconds    INR 1.3 (H) 0.9 - 1.1    aPTT 34 27 - 38 seconds   Renal Function Panel   Result Value Ref  Range    Glucose 182 (H) 74 - 99 mg/dL    Sodium 133 (L) 136 - 145 mmol/L    Potassium 4.1 3.5 - 5.3 mmol/L    Chloride 98 98 - 107 mmol/L    Bicarbonate 25 21 - 32 mmol/L    Anion Gap 14 10 - 20 mmol/L    Urea Nitrogen 44 (H) 6 - 23 mg/dL    Creatinine 1.79 (H) 0.50 - 1.30 mg/dL    eGFR 42 (L) >60 mL/min/1.73m*2    Calcium 7.9 (L) 8.6 - 10.6 mg/dL    Phosphorus 4.7 2.5 - 4.9 mg/dL    Albumin 2.3 (L) 3.4 - 5.0 g/dL   Blood Gas Arterial Full Panel   Result Value Ref Range    POCT pH, Arterial 7.30 (L) 7.38 - 7.42 pH    POCT pCO2, Arterial 49 (H) 38 - 42 mm Hg    POCT pO2, Arterial 85 85 - 95 mm Hg    POCT SO2, Arterial 97 94 - 100 %    POCT Oxy Hemoglobin, Arterial 94.0 94.0 - 98.0 %    POCT Hematocrit Calculated, Arterial 30.0 (L) 41.0 - 52.0 %    POCT Sodium, Arterial 129 (L) 136 - 145 mmol/L    POCT Potassium, Arterial 4.0 3.5 - 5.3 mmol/L    POCT Chloride, Arterial 99 98 - 107 mmol/L    POCT Ionized Calcium, Arterial 1.10 1.10 - 1.33 mmol/L    POCT Glucose, Arterial 185 (H) 74 - 99 mg/dL    POCT Lactate, Arterial 1.9 0.4 - 2.0 mmol/L    POCT Base Excess, Arterial -2.5 (L) -2.0 - 3.0 mmol/L    POCT HCO3 Calculated, Arterial 24.1 22.0 - 26.0 mmol/L    POCT Hemoglobin, Arterial 9.9 (L) 13.5 - 17.5 g/dL    POCT Anion Gap, Arterial 10 10 - 25 mmo/L    Patient Temperature 37.0 degrees Celsius    FiO2 40 %   CALCIUM, IONIZED   Result Value Ref Range    POCT Calcium, Ionized 1.09 (L) 1.1 - 1.33 mmol/L   POCT GLUCOSE   Result Value Ref Range    POCT Glucose 172 (H) 74 - 99 mg/dL   POCT GLUCOSE   Result Value Ref Range    POCT Glucose 196 (H) 74 - 99 mg/dL   POCT GLUCOSE   Result Value Ref Range    POCT Glucose 150 (H) 74 - 99 mg/dL   POCT GLUCOSE   Result Value Ref Range    POCT Glucose 154 (H) 74 - 99 mg/dL   POCT GLUCOSE   Result Value Ref Range    POCT Glucose 196 (H) 74 - 99 mg/dL   CBC   Result Value Ref Range    WBC 8.3 4.4 - 11.3 x10*3/uL    nRBC 0.2 (H) 0.0 - 0.0 /100 WBCs    RBC 3.59 (L) 4.50 - 5.90 x10*6/uL     Hemoglobin 9.9 (L) 13.5 - 17.5 g/dL    Hematocrit 27.6 (L) 41.0 - 52.0 %    MCV 77 (L) 80 - 100 fL    MCH 27.6 26.0 - 34.0 pg    MCHC 35.9 32.0 - 36.0 g/dL    RDW 19.8 (H) 11.5 - 14.5 %    Platelets 90 (L) 150 - 450 x10*3/uL   CALCIUM, IONIZED   Result Value Ref Range    POCT Calcium, Ionized 1.05 (L) 1.1 - 1.33 mmol/L   Blood Gas Arterial Full Panel   Result Value Ref Range    POCT pH, Arterial 7.45 (H) 7.38 - 7.42 pH    POCT pCO2, Arterial 40 38 - 42 mm Hg    POCT pO2, Arterial 149 (H) 85 - 95 mm Hg    POCT SO2, Arterial 100 94 - 100 %    POCT Oxy Hemoglobin, Arterial 96.2 94.0 - 98.0 %    POCT Hematocrit Calculated, Arterial 31.0 (L) 41.0 - 52.0 %    POCT Sodium, Arterial 129 (L) 136 - 145 mmol/L    POCT Potassium, Arterial 3.3 (L) 3.5 - 5.3 mmol/L    POCT Chloride, Arterial 98 98 - 107 mmol/L    POCT Ionized Calcium, Arterial 1.11 1.10 - 1.33 mmol/L    POCT Glucose, Arterial 191 (H) 74 - 99 mg/dL    POCT Lactate, Arterial 1.5 0.4 - 2.0 mmol/L    POCT Base Excess, Arterial 3.5 (H) -2.0 - 3.0 mmol/L    POCT HCO3 Calculated, Arterial 27.8 (H) 22.0 - 26.0 mmol/L    POCT Hemoglobin, Arterial 10.4 (L) 13.5 - 17.5 g/dL    POCT Anion Gap, Arterial 7 (L) 10 - 25 mmo/L    Patient Temperature 37.0 degrees Celsius    FiO2 30 %                      Assessment/Plan   Principal Problem:    Soft tissue sarcoma (Multi)  Active Problems:    Acute kidney injury (nontraumatic) (CMS-HCC)    Pulmonary embolus (Multi)    Anemia    Thrombocytopenia (CMS-HCC)    Current chronic use of systemic steroids    Gastroesophageal reflux disease without esophagitis    Extraskeletal myxoid chondrosarcoma (Multi)    Cardiopulmonary arrest (Multi)    Acute respiratory failure with hypoxia (Multi)    Tracheostomy hemorrhage (Multi)    Postoperative wound breakdown, initial encounter    Postoperative wound breakdown, sequela    Jason Hollins is a 63 y.o. male with PMH of DM2, HLD, myxoid chondrosarcoma s/p wide resection sarcoma, sciatic nerve neurolysis  of right lower extremity with right gluteal reconstruction, pedicle ALT, vastus lateralus flap, pedicle gluteal and perisformis flap with Dr. Hawkins and Dr. Smith on 3/5/24.  Post operative course was uncomplicated and patient was on RNF until 3/20 for prolonged bed rest to avoid hip flexion to maintain flap integrity.  Patient was on prophylactic lovenox while on bedrest.      3/20: Cardiopulmonary arrest s/p CPR with ROSC after TPA, overnight started on heparin, epo, increased pressor requirements, increased swelling at flap site.      3/21: Hemorrhagic shock, MTP. Firm and large right flap site. Return to OR s/p Exploration of right thigh wound, Evacuation of hematoma. Suture ligation of multiple bleeding vessel and several points in gluteal area.      4/1: s/p Trach     4/4: return OR with ENT s/p laryngoscopy, esophagoscopy and bronchoscopy, trach revision. Found extensive clot burden in esophagus and stomach as well as in the lungs. Oozing at thyroid bed cauterized. Trach exchanged to new 6.0 prox XLT elvie. OR findings:  blood up glottis and from thyroid bed to airway.     4/9: Patient is medically stable for OR on 4/11/24.  He is appropriately n.p.o. since midnight and has had more DVT prophylaxis held for OR today.     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 Hawkins and Dr. Smith on 3/5/24 s/p cardiopulmonary arrest with ROSC 3/20. CT head 3/22 without acute process. Weaned off cisatracurium and propofol overnight 3/23-3/24. Ketamine weaned off 3/25 and Fentanyl weaned off 3/26 am. Repeat CT Head 3/26 without acute process. MRI Brain 3/30, negative for cerebral infarction or hemorrhage. Neuro exam - off sedation, following commands except for RLE, tracking, nodding/shaking head to questions  - Post op  pain - PRN IV Dilaudid 0.2mg q3 first line and IV Dilaudid 0.4mg q4 second line ordered - will monitor use during day. Avoiding tylenol given liver injury recently  - ongoing neuro and pain assessments  - keep off sedation  - PT/OT -> AROM     CV: History of HTN, HLD. Acute cardiopulmonary arrest 2/2 to possible massive PE vs massive STEMI, received multiple rounds of CPR and one defibrillation.  Sustained ROSC achieved after TPA bolus. On high dose levophed, epinephrine, vaso, angioT2. Plan on 3/21 was for ECMO which was aborted secondary to large hemhorrge at right flap site. Had MTP and taken OR with 5L evacuated. ECHO 3/21: Normal LV, Mod enlarged RV, slight suggestion of a Townsend's sign / RV strain, low normal RV function. ECHO 3/22 with hyperdynamic LV. New onset Afib with RVR overnight 3/22-3/23, dosed with 150mg amio x2, started infusion at 1mg/min thereafter. AT2 weaned off. Amio infusion discontinued 3/25. Lactate downtrending. Vaso and epi weaned off. Remains in NSR. iEpo weaned off 3/27. Repeat TTE 3/29 and 4/2 essentially unchanged. Levo resumed 4/2 evening to continue aggressive fluid removal. Repeat TTE on 4/10 with LVEF 65-70% and RV difficulty to assess.  - continuous EKG/abp/cvp monitoring  - Goal map range 65-90  -midodrine to 20 mg 3 times daily  - Continue florinef 0.1mg BID   -Continue stress dose steroids to 50 mg q 6h   - Ordered concentrated albumin 25g q6h for 24 hr  - On Levo 0.01 for BP support this AM to maintain goal MAPs - will wean Levo as tolerated  - Holding heparin infusion for now      PULM: Arrived to SICU intubated julio c-CPR. Concern for massive PE. Started on iEpo, currently on 0.05. Hypoxic respiratory failure. Severe ARDS. ETT exchanged 3/23. CT Chest 3/26 with interval JOHN consolidative/ground glass opacity. S/p Tracheostomy on 4/1. Bedside bronch -> some blood in trach, posterior wall tissue just distal to trach tip appears to be collapsing on cannula.  - Has been on  pressure support ventilation - FiO2 40%, Psupp 8, PEEP 8 - given good oxygenation, will try to decrease PEEP as tolerable  - ABGs prn  - additional pulm toilet prn -will discuss regimen with RT  - daily CXR -chest x-ray today indicates similar opacities most prominently in R mid-lower lung fields. Noticeable bilateral pleural effusion. Will POCUS today to assess for loculated effusion     ENT: Had epistaxis 3/23, completed afrin bid x3 days. S/p trach on 4/1. Bleeding from trach site 4/2 am, packing placed by ENT. Repeat episode of bloody tracheal secretions 4/3 through this am. Taken back to OR with ENT 4/4 as per above.  -No bleeding from trach site noted     GI: NPO. Shock liver, pancreatitis. Elevated TG. Elevated lactate. Consulted ACS for potential bowel ischemia as cause of persistent lactic acidosis. CT imaging 3/22 with evidence of pancreatitis. No acute surgical indication per ACS. RUQ US 3/22 with thickening of GB wall without cholelithiasis. CT A/P 3/26 negative for acute bleed. LFTs downtrending. Worsening hyperbilirubinemia. Amylase and lipase now WNL. Repeat RUQ US 4/1 with nonspecific gallbladder wall edema and thickening which may be 2/2 generalized fluid overload, mild hepatomegaly with slight nodular contour and c/f steatosis and fibrosis, irregular hypoechoic region adjacent to hepatic veins. US liver with doppler 4/2 revealed hepatomegaly with nodular contour, mildly elevated RI in main and left hepatic arteries. Hyperbilirubinemia  - Abdominal pain - ileus, SBO unlikely given continued bowel movements. Exam non-peritonitic. Alternative etiologies may be UTI/hydronephrosis/pylo? Unlikely to be 2/2 to TF initiation given benign abdominal exam, continued BM  - prostat daily  - PPI daily for GI prophy  - Trend LFTs daily -AST/ALT/total bilirubin is approximately similar to prior days.  Per discussion with hepatology yesterday, minor fluctuations are to be expected prior to eventual downtrend      : No history of renal disease. Baseline creatinine 0.6. Anuric RUTH. Elevated CK, downtrending. Metabolic acidosis, total body fluid overload, hyperkalemia. Started on CVVH 3/21, stopped bicarb infusion 3/22. Marino removed 3/24. Hyponatremia resolved. Stopped CVVH 4/2.  Has been tolerating SLED per nephrology recs  - Nephrology following - planning for SLED tonight  - RFP BID and PRN  - Replete electrolytes judiciously - RFP to be drawn 4 hours after SLED for repletion of electrolytes  - Bladder scan pending this AM      HEME: Patient was on ppx lovenox for DVT prophylaxis prior to cardiopulmonary arrest. Concern for massive PE patient received bolus of TPA during CPR. Started on heparin infusion overnight given concern for PE. Hemorrhagic shock 3/21, MTP and back to OR s/p Exploration of right thigh woundEvacuation of hematoma. Suture ligation of multiple bleeding vessel and several points in gluteal area. Hematology consulted 3/22 to r/o HLH. Transfused 1 RBC overnight 3/22-3/23. Thrombocytopenia, coagulapathy, hypofibrinogenemia. LE Duplex 3/25 +acute occlusive R soleal DVT. Received 2u PRBC and 1u FFP on 3/26. Heparin infusion discontinued 3/26 to r/o HIT and transitioned to bival. PF4 negative, resumed heparin infuision 3/27. Elevated IL2R and decreased NK function. C/f HLH (low suspicion), empirically treated with decadron (3/28-3/31). Thrombocytopenia on 3/30 to 18k, received 5pk, did not increment. Heparin held. Thrombocytopenia likely 2/2 to consumptive process, hematology following. Received IVIG and 5pk plts 3/31. Pancytopenia persists, improving thrombocytopenia  - cbc, coags bid and prn  -Hemoglobin stable at 9.9 (prior hemoglobin 9.6)  - SubQ Heparin restarted on 4/11 post op  - Hematology following, appreciate recs -> maintain Plt count >35k  - ongoing monitoring for s/s bleeding  - close surveillance of RLE and drains  - Vasc Med signed off 3/27  - maintain active T&S (4/10)     ENDO: History of  DM2. A1c 7.5%. TSH WNL 1/2024. Hyperglycemia  - Restarted Lantus 16u BID   - q4h accuchecks and SSI #4     ID: Leukocytosis resolved, now with leukopenia. On broad antimicrobial therapy. MRSA screen + 2/28/24. Pan cultures sent 3/22. Sputum NTF, +MRSA screen (completed 5 day course mupirocin), UCx and BCx negative. Completed 7 day course vanc/zosyn 3/27. Febrile to 39.1 4/3, resent blood cultures and BAL and started vanco and zosyn. Switched zosyn to reynaldo, kept on vanco, added john. Blood cultures and sputum with Klebsiella. Repeat blood cultures sent 4/4.  - F/U cultures  - temp q4h, wbc daily  - Per ID - Meropenem 1g q8hr with end date 4/14/24 - plan to continue with current plan unless patient has increasing pressor requirements  - ongoing monitoring for s/s infection  - plastics team noted some darkened discoloration of the native tissue at the R posterior hip incision that is c/f necrosis -debridement today  - If continues to be hypotensive consider broaden abx/ add antifungal     Lines:  - right radial a line (4/5)  - RIJ trialysis (3/27)  - PIV x2    Patient seen and discussed with SICU Fellow Dr. Barrett and Dr. Moreno.    Mariluz Dailey, PGY-1  SICU Team

## 2024-04-12 NOTE — PROGRESS NOTES
Jason Hollins is a 63 y.o. male on day 38 of admission presenting with Soft tissue sarcoma (Multi).      Subjective   Pt seen resting in bed. Follows commands. Remains on levo 0.01, he has tenuous hemodynamics with drop in BP with levo off so it remains today. Remains anuric.         Objective          Vitals 24HR  Heart Rate:  [59-95]   Temp:  [36 °C (96.8 °F)-36.9 °C (98.4 °F)]   Resp:  [0-32]   BP: ()/(45-76)   Weight:  [126 kg (277 lb 9 oz)]   SpO2:  [89 %-99 %]         Intake/Output last 3 Shifts:    Intake/Output Summary (Last 24 hours) at 4/12/2024 1505  Last data filed at 4/12/2024 1400  Gross per 24 hour   Intake 2026.32 ml   Output 2250 ml   Net -223.68 ml       Physical Exam  General appearance: intubated, trached  Neck: trach  Heart: RRR  Lungs: FiO2 40% on vent via trach  : no Marino  Neuro: alert, follows commands  Ext: 2+ pitting edema in UE and LE b/l  Dialysis ACCESS:  right I J trialysis    Current Facility-Administered Medications:     albumin human 25 % solution 25 g, 25 g, intravenous, q6h, Alesha Mckeon PA-C, Stopped at 04/12/24 1137    alteplase (Cathflo Activase) injection 2 mg, 2 mg, intra-catheter, PRN, Mariluz Dailey MD    calcium gluconate in NS IV 1 g, 1 g, intravenous, q6h PRN, Mariluz Dailey MD, Stopped at 04/08/24 0335    calcium gluconate in NS IV 2 g, 2 g, intravenous, q6h PRN, Mariluz Dailey MD, Last Rate: 100 mL/hr at 04/04/24 1118, 2 g at 04/04/24 1118    dextrose 50 % injection 12.5 g, 12.5 g, intravenous, q15 min PRN, Mariluz Dailey MD    fludrocortisone (Florinef) tablet 0.1 mg, 0.1 mg, nasogastric tube, q12h, Mariluz Dailey MD, 0.1 mg at 04/12/24 1202    glucagon (Glucagen) injection 1 mg, 1 mg, intramuscular, q15 min PRN, Mariluz Dailey MD    heparin (porcine) injection 5,000 Units, 5,000 Units, subcutaneous, q8h, Mariluz Dailey MD, 5,000 Units at 04/12/24 1400    hydrocortisone sod succ (PF) (Solu-CORTEF) injection 50 mg, 50 mg, intravenous, q6h, Mariluz Dailey MD, 50 mg  at 04/12/24 0945    HYDROmorphone (Dilaudid) injection 0.2 mg, 0.2 mg, intravenous, q3h PRN, Mariluz Dailey MD, 0.2 mg at 04/12/24 0857    HYDROmorphone (Dilaudid) injection 0.4 mg, 0.4 mg, intravenous, q4h PRN, Mariluz Dailey MD, 0.4 mg at 04/12/24 1039    insulin glargine (Lantus) injection 16 Units, 16 Units, subcutaneous, q12h, Mariluz Dailey MD, 16 Units at 04/12/24 0835    insulin lispro (HumaLOG) injection 0-20 Units, 0-20 Units, subcutaneous, q4h, Mrailuz Dailey MD, 8 Units at 04/12/24 1400    lactated Ringer's infusion, 5 mL/hr, intravenous, Continuous, Mariluz Dailey MD, Last Rate: 5 mL/hr at 04/12/24 1200, 5 mL/hr at 04/12/24 1200    magnesium sulfate IV 2 g, 2 g, intravenous, q6h PRN, Mariluz Dailey MD, Stopped at 03/24/24 1726    magnesium sulfate IV 4 g, 4 g, intravenous, q6h PRN, Mariluz Dailey MD, Stopped at 04/05/24 0838    meropenem (Merrem) in sodium chloride 0.9 % 100 mL IV 1 g, 1 g, intravenous, q8h, Mariluz Dailey MD, Last Rate: 200 mL/hr at 04/12/24 1400, 1 g at 04/12/24 1400    midodrine (Proamatine) tablet 20 mg, 20 mg, orogastric tube, q8h, Mariluz Dailey MD, 20 mg at 04/12/24 0928    norepinephrine (Levophed) 8 mg in dextrose 5% 250 mL (0.032 mg/mL) infusion (premix), 0.01-0.5 mcg/kg/min (Dosing Weight), intravenous, Continuous, Mariluz Dailey MD, Last Rate: 1.89 mL/hr at 04/12/24 0800, 0.01 mcg/kg/min at 04/12/24 0800    oxygen (O2) therapy, , inhalation, Continuous - Inhalation, Mariluz Dailey MD, Start at 04/12/24 0800    pantoprazole (ProtoNix) injection 40 mg, 40 mg, intravenous, Daily, Mariluz Dailey MD, 40 mg at 04/12/24 0827    sodium chloride (Ocean) 0.65 % nasal spray 1 spray, 1 spray, Each Nostril, 4x daily PRN, Mariluz Dailey MD    sodium phosphate 15 mmol in sodium chloride 0.9% 250 mL IV, 15 mmol, intravenous, Once, Mariluz Dailey MD, Last Rate: 62.5 mL/hr at 04/12/24 1400, 15 mmol at 04/12/24 1400      Assessment/Plan   Jason Hollins is a 63 y.o. male with PMH of DM2, HLD,  myxoid chondrosarcoma s/p wide resection sarcoma, sciatic nerve neurolysis of right lower extremity with right gluteal reconstruction, pedicle ALT, vastus lateralus flap, pedicle gluteal and perisformis flap with Dr. Hawkins and Dr. Smith on 3/5/24. On 3/20, he developed massive PE and was given TPA, taken to OR in setting of increased swelling at flap site- hematoma for exploration and evacuation. Nephrology following for RUTH.    #Acute Kidney Injury on Dialysis (RUTH-D), anuric  - etiology hemodynamic from hemorrhagic shock  - began on CVVH since 3/21 via R I J trialysis and transitioned to SLED on 4/2  - Cr baseline 0.6  - Metabolic parameters acceptable   - SLED overnight, net -1.3L. UOP 75cc  - FiO2 40% on vent - trach  - on levophed 0.01     Myxoid Chondrosarcoma s/p wide resection 3/5/2024  Thrombocytopenia  -OR on 4/11 for thigh debridement    Plan  - Very minimal levophed requirements however BP remains soft so will plan on SLED tonight with goal 1.5-2L UF given generalized edema.   - do not check BMP during SLED as electrolytes will not be accurate; instead, check BMP >4h after SLED conclusion  - continue bladder scan once per shift to monitor for renal recovery  - when more hemodynamically stable will eval for transition to iHD    D/w Dr. Yolanda Hung DO  Nephrology Fellow PGY 5  Contact via secure chat  Nephrology Pager # [phone] ([phone])

## 2024-04-12 NOTE — PROGRESS NOTES
Jason Hollins is a 63 y.o. male on day 37 of admission presenting with Soft tissue sarcoma (CMS/HCC).    Subjective   Interval History:  -Contacted as he is planned for debridement of his R posterior gluteal area with tissue advancement today, and he had two days of hypotension prior to today that have since resolved     -More alert than previous visits    Review of Systems    Objective   Range of Vitals (last 24 hours)  Heart Rate:  []   Temp:  [36 °C (96.8 °F)-36.9 °C (98.4 °F)]   Resp:  [0-38]   BP: ()/(45-81)   Weight:  [126 kg (277 lb 9 oz)]   SpO2:  [91 %-98 %]   Daily Weight  04/11/24 : 126 kg (277 lb 9 oz)    Body mass index is 39.83 kg/m².    Physical Exam  GEN: Acutely ill-appearing, jaundiced.   HEENT: Icteric sclera. Trach and NG in place without visible bleeding  CV: RRR, no murmurs  RESP: Mechanical breath sounds. Coarse sounds bilaterally  ABD: Soft, NT, ND  EXT: Edema present    Relevant Results  Labs  Results from last 72 hours   Lab Units 04/11/24  1554 04/11/24  0159 04/10/24  1400   WBC AUTO x10*3/uL 10.7 8.5 7.3   HEMOGLOBIN g/dL 9.7* 9.6* 9.6*   HEMATOCRIT % 26.0* 27.1* 26.8*   PLATELETS AUTO x10*3/uL 85* 84* 72*     Results from last 72 hours   Lab Units 04/11/24  1554 04/11/24  0159 04/10/24  1400   SODIUM mmol/L 133* 133* 134*   POTASSIUM mmol/L 4.1 3.8 3.3*   CHLORIDE mmol/L 98 98 98   CO2 mmol/L 25 24 29   BUN mg/dL 44* 32* 19   CREATININE mg/dL 1.79* 1.44* 0.87   GLUCOSE mg/dL 182* 240* 158*   CALCIUM mg/dL 7.9* 8.0* 7.9*   ANION GAP mmol/L 14 15 10   EGFR mL/min/1.73m*2 42* 55* >90   PHOSPHORUS mg/dL 4.7 2.8 <1.0*     Results from last 72 hours   Lab Units 04/11/24  1554 04/11/24  0159 04/10/24  1400 04/10/24  0433   ALK PHOS U/L  --  251* 263* 267*   BILIRUBIN TOTAL mg/dL  --  28.9* 28.4* 28.4*   BILIRUBIN DIRECT mg/dL  --  19.2* 18.3* 18.9*   PROTEIN TOTAL g/dL  --  4.9* 4.6* 4.5*   ALT U/L  --  8* 9* 12   AST U/L  --  77* 80* 93*   ALBUMIN g/dL 2.3* 2.6* 2.5* 2.6*  "    Estimated Creatinine Clearance: 57 mL/min (A) (by C-G formula based on SCr of 1.79 mg/dL (H)).  No results found for: \"CRP\"  Microbiology  Susceptibility data from last 14 days.  Collected Specimen Info Organism Amoxicillin/Clavulanate Ampicillin Ampicillin/Sulbactam Aztreonam Cefazolin Cefepime Ciprofloxacin Ertapenem Gentamicin Gentamicin Synergy Imipenem Levofloxacin Meropenem   04/03/24 Blood culture from Peripheral Venipuncture Klebsiella (Enterobacter) aerogenes                04/03/24 Blood culture from Peripheral Venipuncture Klebsiella (Enterobacter) aerogenes R R R S R S S S S  I S S   04/03/24 Fluid from BAL Enterococcus faecalis  S        S        Klebsiella (Enterobacter) aerogenes R R R  R S S  S  S S      Collected Specimen Info Organism Penicillin Piperacillin/Tazobactam Tetracycline Trimethoprim/Sulfamethoxazole Vancomycin   04/03/24 Blood culture from Peripheral Venipuncture Klebsiella (Enterobacter) aerogenes        04/03/24 Blood culture from Peripheral Venipuncture Klebsiella (Enterobacter) aerogenes  R  S    04/03/24 Fluid from BAL Enterococcus faecalis S  R R S     Klebsiella (Enterobacter) aerogenes  S  S        Assessment/Plan   63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 Course subsequently included PEA arrest in setting of PE 3/20, hemorrhagic shock from R thigh wound bleeding, trach 4/1 with trach revision 4/4 in setting of bleeding.    He developed a K. Aerogenes bacteremia in setting of VAP, for which he was planned for 10 days of therapy. His procedure was delayed until today, and in the setting of recent and now resolved hypotension, we were contacted regarding further management. Depending on depth of wound and results of any obtained cultures, we can finalize a course.    Recommendations:  -Will follow-up operative findings and/or any cultures that might have been " taken  -Continue meropenem for now (prior end date 4/14)  -Will follow     Please contact ID Team A (fellow Joce Fulton MD) via EpicChat or pager [phone] with questions.  Gilberto Hilario MD

## 2024-04-12 NOTE — PROGRESS NOTES
"Nutrition Follow Up Assessment  Nutrition Assessment    Reason for Assessment: Dietitian discretion (follow up)    Remains on enteral feeds-- running at 30 ml/hr at time of RDN visit, prior to trip to OR On 4/11 pt was receiving at goal rate consistently with minimal holds. S/p debridement of necrotic tissue on 4/11. Has trach and remains on vent. Also on SLED with levo.    Anthropometrics:  Height: 177.8 cm (5' 10\")   Weight: 126 kg (277 lb 9 oz)   BMI (Calculated): 39.51  IBW/kg (Dietitian Calculated): 75.5 kg  Percent of IBW: 165 %     Admission Weight Trend:    Date/Time Weight Dosing Weight   04/11/24 1532 126 kg (277 lb 9 oz) --   04/10/24 0400 125 kg (275 lb 9.2 oz) --   04/07/24 0800 --  --   Weight: Unable to weigh pt.(Sand bed/immobile) at 04/07/24 0800   04/04/24 1900 125 kg (275 lb 9.2 oz) --   04/02/24 2326 125 kg (275 lb 5.7 oz) --   03/31/24 0600 125 kg (275 lb 5.7 oz) --   03/30/24 2000 -- 101 kg (222 lb 10.6 oz)   03/30/24 0600 129 kg (283 lb 15.2 oz) --   03/29/24 2000 -- 101 kg (222 lb 10.6 oz)   03/29/24 0600 129 kg (284 lb 9.8 oz)      Nutrition Focused Physical Exam Findings:      Edema:  Edema: +3 moderate  Edema Location: bilat UE + LE  Physical Findings:  Skin:  (right thigh incision)    Nutrition Significant Labs:  BG POCT trend:   Results from last 7 days   Lab Units 04/12/24  0812 04/12/24  0344 04/12/24  0003 04/11/24  2023 04/11/24  1604   POCT GLUCOSE mg/dL 196* 154* 150* 196* 172*    , Renal Lab Trend:   Results from last 7 days   Lab Units 04/12/24  0924 04/12/24  0814 04/11/24  1554 04/11/24  0159   POTASSIUM mmol/L 3.3* 3.4* 4.1 3.8   PHOSPHORUS mg/dL 2.2*  --  4.7 2.8   SODIUM mmol/L 134* 133* 133* 133*   MAGNESIUM mg/dL  --  2.01 2.25 1.96   EGFR mL/min/1.73m*2 88 87 42* 55*   BUN mg/dL 22 21 44* 32*   CREATININE mg/dL 0.97 0.98 1.79* 1.44*        Nutrition Specific Medications:  Scheduled medications  hydrocortisone sodium succinate, 50 mg, intravenous, q6h  insulin glargine, 16 " Units, subcutaneous, q12h  insulin lispro, 0-20 Units, subcutaneous, q4h  pantoprazole, 40 mg, intravenous, Daily  potassium chloride, 20 mEq, intravenous, q2h    Continuous medications  lactated Ringer's, 5 mL/hr, Last Rate: 5 mL/hr (04/12/24 0900)  norepinephrine, 0.01-0.5 mcg/kg/min (Dosing Weight), Last Rate: 0.01 mcg/kg/min (04/12/24 0800)      PRN medications  PRN medications: alteplase, calcium gluconate, calcium gluconate, dextrose, glucagon, HYDROmorphone, HYDROmorphone, magnesium sulfate, magnesium sulfate, sodium chloride      I/O:   Last BM Date: 04/12/24; Stool Appearance: Mucous (04/12/24 0800)    Dietary Orders (From admission, onward)       Start     Ordered    04/12/24 1046  Oral nutritional supplements  Until discontinued        Comments: Mix with at least 30-60ml water. Flush enteral feeding tube with at least 30-60ml after administration   Question Answer Comment   Deliver with Breakfast    Deliver with Lunch    Deliver with Dinner    Select supplement: Nutrasource Fiber        04/12/24 1045    04/11/24 1648  Enteral feeding with NPO Pivot 1.5; NG (nasogastric tube); 45  Diet effective now        Question Answer Comment   Tube feeding formula: Pivot 1.5    Tube feeding additive: Pro-Stat AWC    Tube feeding additive frequency: Once a day    Feeding route: NG (nasogastric tube)    Tube feeding continuous rate (mL/hr): 45        04/11/24 1651    04/05/24 1041  Oral nutritional supplements  Until discontinued        Comments: Flush with 30ml before and after admin   Question Answer Comment   Deliver with Dinner    Select supplement: Pro-Stat AWC        04/05/24 1041                     Estimated Needs:   Total Energy Estimated Needs (kCal):  (5233-6363)  Method for Estimating Needs: hypocaloric feeding (11-14kcal/kg) using 129kg weight from 3/22  Total Protein Estimated Needs (g):  (113-151)  Method for Estimating Needs: 1.5-2 g/kg  Total Fluid Estimated Needs (mL):  (per SICU)        Nutrition  Diagnosis   Malnutrition Diagnosis  Patient has Malnutrition Diagnosis: Yes  Diagnosis Status: New  Malnutrition Diagnosis: Moderate malnutrition related to acute disease or injury  As Evidenced by: <50% of estimated energy requirements for >5days (no intake x 6 days), +2 edema, current critically ill state.  Additional Assessment Information: Has been receiving nutrition support at goal since 3/29, likely to not see significant improvements in nutritional status until pt reaches a more chronic state.      Nutrition Interventions/Recommendations         Nutrition Prescription:  Individualized Nutrition Prescription Provided for : continue with enteral nutrition support        Nutrition Interventions:   Continue with Pivot 1.5 @ 45 ml/hr + 1 pkt prostat daily  1720 kcal, 118 g protein, 972 ml free H20     Free H20 flush per SICU-- provides 972 ml free H20 daily    Replete lytes as needed       Nutrition Monitoring and Evaluation   Food/Nutrient Related History Monitoring  Monitoring and Evaluation Plan: Enteral and parenteral nutrition intake  Criteria: pt will receive >/= 80% prescribed enteral feeds  Additional Plans: continue with fiber as needed for stool bulking      Biochemical Data, Medical Tests and Procedures  Monitoring and Evaluation Plan: Electrolyte/renal panel, Glucose/endocrine profile  Criteria: lytes WNL  Criteria: blood glucose 140-180 mg/dl    Time Spent/Follow-up Reminder:   Time Spent (min): 45 minutes  Last Date of Nutrition Visit: 04/12/24  Nutrition Follow-Up Needed?: Dietitian to reassess per policy  Follow up Comment: niranjan gordon

## 2024-04-12 NOTE — PROGRESS NOTES
ENT DAILY PROGRESS NOTE  Name: Jason Hollins  MRN: [id number]  : 1960    Identification Statement  The patient is a 63 y.o. male with past medical history significant for but not limited to DM2, HLD, myxoid chondrosarcoma s/p wide resection sarcoma, sciatic nerve neurolysis of RLE with right gluteal reconstruction, pedicle ALT, vas lateralus flap, pedicle gluteal and perisformis flap on 3/5/24 with plastic surgery. Patient's hospital course complicated by cardiopulmonary arrest s/p CPR with ROSC after TPA, hemorrhagic shock. Patient initially underwent tracheostomy and bronchoscopy with Dr. Perkins on 24; he experienced post-operative bleeding from trach site and returned to OR with ENT (Dr. Wolfe) on 24 for DL, bronchoscopy, esophagoscopy and exploration of neck/trach site with source of bleeding appearing to be trickling from thyroid bed down into the airway and up to the glottis. Original trach was exchanged and a 6-0 shiley proximal XLT was placed.     Subjective  Patient seen and examined this am. Nursing does not report any further bleeding or issues with the trach. Velcro strap tightened appropriately. Respiratory reports patient placed on full ventilatory support overnight.       Objective  Temp:  [36 °C (96.8 °F)-36.9 °C (98.4 °F)] 36.6 °C (97.9 °F)  Heart Rate:  [59-84] 72  Resp:  [0-32] 20  BP: ()/(45-69) 124/52  Arterial Line BP 1: (107-166)/(36-89) 116/42  FiO2 (%):  [40 %] 40 %  Gen: critically ill patient in the ICU, no acute distress  Eyes: facial jaundice  Skin: bilateral upper extremity edema  Resp: remains on ventilator support   Head: Atraumatic, normocephalic  Ears: Normal external ears   Nose: External nose midline   Neck: 6-0 shiley proximal XLT in place with cuff up, trach ties appropriately tight. No bleeding, oozing or hemorrhage around trach. No skin breakdown around trach.         Intake/Output Summary (Last 24 hours) at 2024 1344  Last data filed at 2024  1200  Gross per 24 hour   Intake 1311.32 ml   Output 2250 ml   Net -938.68 ml       Labs  Results for orders placed or performed during the hospital encounter of 03/05/24 (from the past 24 hour(s))   CBC   Result Value Ref Range    WBC 10.7 4.4 - 11.3 x10*3/uL    nRBC 0.0 0.0 - 0.0 /100 WBCs    RBC 3.31 (L) 4.50 - 5.90 x10*6/uL    Hemoglobin 9.7 (L) 13.5 - 17.5 g/dL    Hematocrit 26.0 (L) 41.0 - 52.0 %    MCV 79 (L) 80 - 100 fL    MCH 29.3 26.0 - 34.0 pg    MCHC 37.3 (H) 32.0 - 36.0 g/dL    RDW 19.5 (H) 11.5 - 14.5 %    Platelets 85 (L) 150 - 450 x10*3/uL   Magnesium   Result Value Ref Range    Magnesium 2.25 1.60 - 2.40 mg/dL   Coagulation Screen   Result Value Ref Range    Protime 14.9 (H) 9.8 - 12.8 seconds    INR 1.3 (H) 0.9 - 1.1    aPTT 34 27 - 38 seconds   Renal Function Panel   Result Value Ref Range    Glucose 182 (H) 74 - 99 mg/dL    Sodium 133 (L) 136 - 145 mmol/L    Potassium 4.1 3.5 - 5.3 mmol/L    Chloride 98 98 - 107 mmol/L    Bicarbonate 25 21 - 32 mmol/L    Anion Gap 14 10 - 20 mmol/L    Urea Nitrogen 44 (H) 6 - 23 mg/dL    Creatinine 1.79 (H) 0.50 - 1.30 mg/dL    eGFR 42 (L) >60 mL/min/1.73m*2    Calcium 7.9 (L) 8.6 - 10.6 mg/dL    Phosphorus 4.7 2.5 - 4.9 mg/dL    Albumin 2.3 (L) 3.4 - 5.0 g/dL   Blood Gas Arterial Full Panel   Result Value Ref Range    POCT pH, Arterial 7.30 (L) 7.38 - 7.42 pH    POCT pCO2, Arterial 49 (H) 38 - 42 mm Hg    POCT pO2, Arterial 85 85 - 95 mm Hg    POCT SO2, Arterial 97 94 - 100 %    POCT Oxy Hemoglobin, Arterial 94.0 94.0 - 98.0 %    POCT Hematocrit Calculated, Arterial 30.0 (L) 41.0 - 52.0 %    POCT Sodium, Arterial 129 (L) 136 - 145 mmol/L    POCT Potassium, Arterial 4.0 3.5 - 5.3 mmol/L    POCT Chloride, Arterial 99 98 - 107 mmol/L    POCT Ionized Calcium, Arterial 1.10 1.10 - 1.33 mmol/L    POCT Glucose, Arterial 185 (H) 74 - 99 mg/dL    POCT Lactate, Arterial 1.9 0.4 - 2.0 mmol/L    POCT Base Excess, Arterial -2.5 (L) -2.0 - 3.0 mmol/L    POCT HCO3 Calculated,  Arterial 24.1 22.0 - 26.0 mmol/L    POCT Hemoglobin, Arterial 9.9 (L) 13.5 - 17.5 g/dL    POCT Anion Gap, Arterial 10 10 - 25 mmo/L    Patient Temperature 37.0 degrees Celsius    FiO2 40 %   CALCIUM, IONIZED   Result Value Ref Range    POCT Calcium, Ionized 1.09 (L) 1.1 - 1.33 mmol/L   POCT GLUCOSE   Result Value Ref Range    POCT Glucose 172 (H) 74 - 99 mg/dL   POCT GLUCOSE   Result Value Ref Range    POCT Glucose 196 (H) 74 - 99 mg/dL   POCT GLUCOSE   Result Value Ref Range    POCT Glucose 150 (H) 74 - 99 mg/dL   POCT GLUCOSE   Result Value Ref Range    POCT Glucose 154 (H) 74 - 99 mg/dL   POCT GLUCOSE   Result Value Ref Range    POCT Glucose 196 (H) 74 - 99 mg/dL   CBC   Result Value Ref Range    WBC 8.3 4.4 - 11.3 x10*3/uL    nRBC 0.2 (H) 0.0 - 0.0 /100 WBCs    RBC 3.59 (L) 4.50 - 5.90 x10*6/uL    Hemoglobin 9.9 (L) 13.5 - 17.5 g/dL    Hematocrit 27.6 (L) 41.0 - 52.0 %    MCV 77 (L) 80 - 100 fL    MCH 27.6 26.0 - 34.0 pg    MCHC 35.9 32.0 - 36.0 g/dL    RDW 19.8 (H) 11.5 - 14.5 %    Platelets 90 (L) 150 - 450 x10*3/uL   CALCIUM, IONIZED   Result Value Ref Range    POCT Calcium, Ionized 1.05 (L) 1.1 - 1.33 mmol/L   Coagulation Screen   Result Value Ref Range    Protime 15.2 (H) 9.8 - 12.8 seconds    INR 1.3 (H) 0.9 - 1.1    aPTT 35 27 - 38 seconds   Hepatic function panel   Result Value Ref Range    Albumin 2.4 (L) 3.4 - 5.0 g/dL    Bilirubin, Total 24.7 (H) 0.0 - 1.2 mg/dL    Bilirubin, Direct 16.8 (H) 0.0 - 0.3 mg/dL    Alkaline Phosphatase 232 (H) 33 - 136 U/L    ALT 7 (L) 10 - 52 U/L    AST 80 (H) 9 - 39 U/L    Total Protein 5.2 (L) 6.4 - 8.2 g/dL   Magnesium   Result Value Ref Range    Magnesium 2.01 1.60 - 2.40 mg/dL   Basic metabolic panel   Result Value Ref Range    Glucose 186 (H) 74 - 99 mg/dL    Sodium 133 (L) 136 - 145 mmol/L    Potassium 3.4 (L) 3.5 - 5.3 mmol/L    Chloride 97 (L) 98 - 107 mmol/L    Bicarbonate 28 21 - 32 mmol/L    Anion Gap 11 10 - 20 mmol/L    Urea Nitrogen 21 6 - 23 mg/dL     Creatinine 0.98 0.50 - 1.30 mg/dL    eGFR 87 >60 mL/min/1.73m*2    Calcium 7.9 (L) 8.6 - 10.6 mg/dL   Blood Gas Arterial Full Panel   Result Value Ref Range    POCT pH, Arterial 7.45 (H) 7.38 - 7.42 pH    POCT pCO2, Arterial 40 38 - 42 mm Hg    POCT pO2, Arterial 149 (H) 85 - 95 mm Hg    POCT SO2, Arterial 100 94 - 100 %    POCT Oxy Hemoglobin, Arterial 96.2 94.0 - 98.0 %    POCT Hematocrit Calculated, Arterial 31.0 (L) 41.0 - 52.0 %    POCT Sodium, Arterial 129 (L) 136 - 145 mmol/L    POCT Potassium, Arterial 3.3 (L) 3.5 - 5.3 mmol/L    POCT Chloride, Arterial 98 98 - 107 mmol/L    POCT Ionized Calcium, Arterial 1.11 1.10 - 1.33 mmol/L    POCT Glucose, Arterial 191 (H) 74 - 99 mg/dL    POCT Lactate, Arterial 1.5 0.4 - 2.0 mmol/L    POCT Base Excess, Arterial 3.5 (H) -2.0 - 3.0 mmol/L    POCT HCO3 Calculated, Arterial 27.8 (H) 22.0 - 26.0 mmol/L    POCT Hemoglobin, Arterial 10.4 (L) 13.5 - 17.5 g/dL    POCT Anion Gap, Arterial 7 (L) 10 - 25 mmo/L    Patient Temperature 37.0 degrees Celsius    FiO2 30 %   Renal Function Panel   Result Value Ref Range    Glucose 186 (H) 74 - 99 mg/dL    Sodium 134 (L) 136 - 145 mmol/L    Potassium 3.3 (L) 3.5 - 5.3 mmol/L    Chloride 98 98 - 107 mmol/L    Bicarbonate 28 21 - 32 mmol/L    Anion Gap 11 10 - 20 mmol/L    Urea Nitrogen 22 6 - 23 mg/dL    Creatinine 0.97 0.50 - 1.30 mg/dL    eGFR 88 >60 mL/min/1.73m*2    Calcium 7.9 (L) 8.6 - 10.6 mg/dL    Phosphorus 2.2 (L) 2.5 - 4.9 mg/dL    Albumin 2.3 (L) 3.4 - 5.0 g/dL   Urinalysis with Reflex Culture and Microscopic   Result Value Ref Range    Color, Urine Dark-Orange (N) Light-Yellow, Yellow, Dark-Yellow    Appearance, Urine Ex.Turbid (N) Clear    Specific Gravity, Urine 1.014 1.005 - 1.035    pH, Urine 6.5 5.0, 5.5, 6.0, 6.5, 7.0, 7.5, 8.0    Protein, Urine 100 (2+) (A) NEGATIVE, 10 (TRACE), 20 (TRACE) mg/dL    Glucose, Urine 300 (3+) (A) Normal mg/dL    Blood, Urine OVER (3+) (A) NEGATIVE    Ketones, Urine NEGATIVE NEGATIVE mg/dL     Bilirubin, Urine 0.5 (1+) (A) NEGATIVE    Urobilinogen, Urine 12 (3+) (A) Normal mg/dL    Nitrite, Urine NEGATIVE NEGATIVE    Leukocyte Esterase, Urine 75 Angelia/µL (A) NEGATIVE   Microscopic Only, Urine   Result Value Ref Range    WBC, Urine >50 (A) 1-5, NONE /HPF    RBC, Urine >20 (A) NONE, 1-2, 3-5 /HPF   POCT GLUCOSE   Result Value Ref Range    POCT Glucose 185 (H) 74 - 99 mg/dL   POCT GLUCOSE   Result Value Ref Range    POCT Glucose 201 (H) 74 - 99 mg/dL       Assessment  The patient is a 63 y.o. male with past medical history significant for but not limited to DM2, HLD, myxoid chondrosarcoma s/p wide resection sarcoma, sciatic nerve neurolysis of RLE with right gluteal reconstruction, pedicle ALT, vas lateralus flap, pedicle gluteal and perisformis flap on 3/5/24 with plastic surgery. Patient's hospital course complicated by cardiopulmonary arrest s/p CPR with ROSC after TPA, hemorrhagic shock. Patient initially underwent tracheostomy and bronchoscopy with Dr. Perkins on 4/1/24; he experienced post-operative bleeding from trach site and returned to OR with ENT (Dr. Wolfe) on 4/4/24 for DL, bronchoscopy, esophagoscopy and exploration of neck/trach site with source of bleeding appearing to be trickling from thyroid bed down into the airway and up to the glottis. Original trach was exchanged and a 6-0 shiley proximal XLT was placed. Patient remains on ventilatory support.      Plan:  - Continue routine trach care per nursing (Standard trach care: Cleanse the tracheostomy site with 1/2 hydrogen peroxide & 1/2 normal saline to remove crust twice daily and as needed. Remove and cleanse the inner cannula with 1/2 hydrogen peroxide & 1/2 normal saline to remove mucus build up and rinse well with normal saline twice daily and as needed. Trach ties can be changed as needed. )  - Please call back if patient has been off vent and not in need of positive pressure therapy for 24-48 hours and we can change to cuffless trach  before discharge if deemed appropriate by primary team   - Please call with any questions or concerns  -ENT will continue to follow weekly while inpatient   -ENT will coordinate outpatient follow-up upon discharge    Katelyn Warren PA-C  Otolaryngology- Head & Neck Surgery    ENT Consult pager: [phone]  Please page if urgent

## 2024-04-12 NOTE — PROGRESS NOTES
Physical Therapy                 Therapy Communication Note    Patient Name: Jason Hollins  MRN: [id number]  Today's Date: 4/12/2024     Discipline: Physical Therapy    Missed Visit Reason: Missed Visit Reason:  (1555: Attempted to see pt for PT though currently experiencing increased ABD pain, deferred PT at this time. Per pt's janis who was present at bedside, pt's wife would like to be present for all mobility. Will re-attempt PT as able/appropriate.)  Of note, pt with no mobility restrictions per plastics following most recent procedure 4/11.       Missed Time: Attempt

## 2024-04-12 NOTE — BRIEF OP NOTE
Date: 2024  OR Location: [facility] OR    Name: Jason Hollins, : 1960, Age: 63 y.o., MRN: [id number], Sex: male    Diagnosis  Pre-op Diagnosis     * Postoperative wound breakdown, sequela [T81.31XS] Post-op Diagnosis     * Postoperative wound breakdown, sequela [T81.31XS]     Procedures  Wound Debridement w/wo Flap Closure Leg  54586 - PA DEBRIDEMENT SUBCUTANEOUS TISSUE 1ST 20 SQ CM/<      Surgeons      * Angy Smith - Primary    Resident/Fellow/Other Assistant:  Surgeons and Role:     * Gracie Smith PA-C - Resident - Assisting    Procedure Summary  Anesthesia: General  ASA: IV  Anesthesia Staff: Anesthesiologist: Socrates Patrick MD  CRNA: ZOHREH Walters  SRNA: ZOHREH Centeno    Estimated Blood Loss: 5 mL    Intra-op Medications:   Administrations occurring from 1135 to 1320 on 24:   Medication Name Total Dose   alteplase (Cathflo Activase) injection 2 mg Cannot be calculated   calcium gluconate in NS IV 1 g Cannot be calculated   calcium gluconate in NS IV 2 g Cannot be calculated   fludrocortisone (Florinef) tablet 0.1 mg 0.1 mg   hydrocortisone sod succ (PF) (Solu-CORTEF) injection 50 mg Cannot be calculated   insulin lispro (HumaLOG) injection 0-20 Units Cannot be calculated   lactated Ringer's infusion Cannot be calculated   magnesium sulfate IV 2 g Cannot be calculated   magnesium sulfate IV 4 g Cannot be calculated   meropenem (Merrem) in sodium chloride 0.9 % 100 mL IV 1 g 100 mL   midodrine (Proamatine) tablet 20 mg Cannot be calculated   oxygen (O2) therapy Cannot be calculated   pantoprazole (ProtoNix) injection 40 mg Cannot be calculated   sodium chloride (Ocean) 0.65 % nasal spray 1 spray Cannot be calculated              Anesthesia Record               Intraprocedure I/O Totals          Intake    Phenylephrine Drip 0.00 mL    The total shown is the total volume documented since Anesthesia Start was filed.    Total Intake 0 mL          Specimen:   ID Type  Source Tests Collected by Time   A : Deep Wound Culure Left Glute Tissue ABSCESS FUNGAL CULTURE/SMEAR, TISSUE/WOUND CULTURE/SMEAR Angy Smith MD 4/11/2024 [id number]      Staff:   Circulator: Asher Olvera RN  Relief Circulator: Mikal Campbell RN; Amanda Smith, ROMERO; Nehal Escudero RN  Scrub Person: Constanza Gonzales RN; Edmundo Miller; Tawana Ryan RN    Findings: Approximately 9 x 5 cm segment of necrotic eschar to posterior right gluteal region, adjacent to pedicle flap recipient edge.     Complications:  None; patient tolerated the procedure well.     Disposition:  ICU  Condition: stable  Specimens Collected:   ID Type Source Tests Collected by Time   A : Deep Wound Culure Left Glute Tissue ABSCESS FUNGAL CULTURE/SMEAR, TISSUE/WOUND CULTURE/SMEAR Angy Smith MD 4/11/2024 [id number]     Attending Attestation: I was present and scrubbed for the entire procedure.    Angy Smith  Phone Number: [phone]

## 2024-04-13 ENCOUNTER — APPOINTMENT (OUTPATIENT)
Dept: RADIOLOGY | Facility: HOSPITAL | Age: 64
DRG: 003 | End: 2024-04-13
Payer: COMMERCIAL

## 2024-04-13 LAB
ABO GROUP (TYPE) IN BLOOD: NORMAL
ALBUMIN SERPL BCP-MCNC: 3 G/DL (ref 3.4–5)
ALBUMIN SERPL BCP-MCNC: 3.1 G/DL (ref 3.4–5)
ALP SERPL-CCNC: 197 U/L (ref 33–136)
ALT SERPL W P-5'-P-CCNC: 6 U/L (ref 10–52)
AMYLASE SERPL-CCNC: 27 U/L (ref 29–103)
ANION GAP BLDA CALCULATED.4IONS-SCNC: 11 MMO/L (ref 10–25)
ANION GAP SERPL CALC-SCNC: 15 MMOL/L (ref 10–20)
ANTIBODY SCREEN: NORMAL
APTT PPP: 37 SECONDS (ref 27–38)
AST SERPL W P-5'-P-CCNC: 70 U/L (ref 9–39)
BACTERIA UR CULT: NO GROWTH
BASE EXCESS BLDA CALC-SCNC: -1.7 MMOL/L (ref -2–3)
BILIRUB DIRECT SERPL-MCNC: 19.8 MG/DL (ref 0–0.3)
BILIRUB SERPL-MCNC: 28.8 MG/DL (ref 0–1.2)
BODY TEMPERATURE: 37 DEGREES CELSIUS
BUN SERPL-MCNC: 42 MG/DL (ref 6–23)
CA-I BLD-SCNC: 1.06 MMOL/L (ref 1.1–1.33)
CA-I BLDA-SCNC: 1.16 MMOL/L (ref 1.1–1.33)
CALCIUM SERPL-MCNC: 8.7 MG/DL (ref 8.6–10.6)
CHLORIDE BLDA-SCNC: 98 MMOL/L (ref 98–107)
CHLORIDE SERPL-SCNC: 97 MMOL/L (ref 98–107)
CO2 SERPL-SCNC: 26 MMOL/L (ref 21–32)
CREAT SERPL-MCNC: 1.58 MG/DL (ref 0.5–1.3)
EGFRCR SERPLBLD CKD-EPI 2021: 49 ML/MIN/1.73M*2
ERYTHROCYTE [DISTWIDTH] IN BLOOD BY AUTOMATED COUNT: 20.6 % (ref 11.5–14.5)
GLUCOSE BLD MANUAL STRIP-MCNC: 189 MG/DL (ref 74–99)
GLUCOSE BLD MANUAL STRIP-MCNC: 198 MG/DL (ref 74–99)
GLUCOSE BLD MANUAL STRIP-MCNC: 217 MG/DL (ref 74–99)
GLUCOSE BLD MANUAL STRIP-MCNC: 219 MG/DL (ref 74–99)
GLUCOSE BLD MANUAL STRIP-MCNC: 231 MG/DL (ref 74–99)
GLUCOSE BLD MANUAL STRIP-MCNC: 283 MG/DL (ref 74–99)
GLUCOSE BLDA-MCNC: 215 MG/DL (ref 74–99)
GLUCOSE SERPL-MCNC: 243 MG/DL (ref 74–99)
HCO3 BLDA-SCNC: 25.1 MMOL/L (ref 22–26)
HCT VFR BLD AUTO: 27.8 % (ref 41–52)
HCT VFR BLD EST: 31 % (ref 41–52)
HGB BLD-MCNC: 10 G/DL (ref 13.5–17.5)
HGB BLDA-MCNC: 10.3 G/DL (ref 13.5–17.5)
HOLD SPECIMEN: NORMAL
INHALED O2 CONCENTRATION: 40 %
INR PPP: 1.3 (ref 0.9–1.1)
LACTATE BLDA-SCNC: 1.4 MMOL/L (ref 0.4–2)
LIPASE SERPL-CCNC: 18 U/L (ref 9–82)
MAGNESIUM SERPL-MCNC: 2.19 MG/DL (ref 1.6–2.4)
MCH RBC QN AUTO: 28.7 PG (ref 26–34)
MCHC RBC AUTO-ENTMCNC: 36 G/DL (ref 32–36)
MCV RBC AUTO: 80 FL (ref 80–100)
NRBC BLD-RTO: 0.4 /100 WBCS (ref 0–0)
OXYHGB MFR BLDA: 84.7 % (ref 94–98)
PCO2 BLDA: 51 MM HG (ref 38–42)
PH BLDA: 7.3 PH (ref 7.38–7.42)
PHOSPHATE SERPL-MCNC: 3.8 MG/DL (ref 2.5–4.9)
PLATELET # BLD AUTO: 98 X10*3/UL (ref 150–450)
PO2 BLDA: 58 MM HG (ref 85–95)
POTASSIUM BLDA-SCNC: 3.8 MMOL/L (ref 3.5–5.3)
POTASSIUM SERPL-SCNC: 3.7 MMOL/L (ref 3.5–5.3)
PROT SERPL-MCNC: 5.7 G/DL (ref 6.4–8.2)
PROTHROMBIN TIME: 15.1 SECONDS (ref 9.8–12.8)
RBC # BLD AUTO: 3.49 X10*6/UL (ref 4.5–5.9)
RH FACTOR (ANTIGEN D): NORMAL
SAO2 % BLDA: 88 % (ref 94–100)
SODIUM BLDA-SCNC: 130 MMOL/L (ref 136–145)
SODIUM SERPL-SCNC: 134 MMOL/L (ref 136–145)
WBC # BLD AUTO: 11 X10*3/UL (ref 4.4–11.3)

## 2024-04-13 PROCEDURE — 85027 COMPLETE CBC AUTOMATED: CPT | Performed by: STUDENT IN AN ORGANIZED HEALTH CARE EDUCATION/TRAINING PROGRAM

## 2024-04-13 PROCEDURE — 2020000001 HC ICU ROOM DAILY

## 2024-04-13 PROCEDURE — 2500000004 HC RX 250 GENERAL PHARMACY W/ HCPCS (ALT 636 FOR OP/ED): Mod: JZ | Performed by: NURSE PRACTITIONER

## 2024-04-13 PROCEDURE — 83735 ASSAY OF MAGNESIUM: CPT | Performed by: STUDENT IN AN ORGANIZED HEALTH CARE EDUCATION/TRAINING PROGRAM

## 2024-04-13 PROCEDURE — 82150 ASSAY OF AMYLASE: CPT | Performed by: NURSE PRACTITIONER

## 2024-04-13 PROCEDURE — 2500000004 HC RX 250 GENERAL PHARMACY W/ HCPCS (ALT 636 FOR OP/ED): Performed by: STUDENT IN AN ORGANIZED HEALTH CARE EDUCATION/TRAINING PROGRAM

## 2024-04-13 PROCEDURE — 90937 HEMODIALYSIS REPEATED EVAL: CPT

## 2024-04-13 PROCEDURE — 82330 ASSAY OF CALCIUM: CPT | Performed by: STUDENT IN AN ORGANIZED HEALTH CARE EDUCATION/TRAINING PROGRAM

## 2024-04-13 PROCEDURE — 99233 SBSQ HOSP IP/OBS HIGH 50: CPT | Performed by: STUDENT IN AN ORGANIZED HEALTH CARE EDUCATION/TRAINING PROGRAM

## 2024-04-13 PROCEDURE — 2500000004 HC RX 250 GENERAL PHARMACY W/ HCPCS (ALT 636 FOR OP/ED): Mod: JZ | Performed by: PHYSICIAN ASSISTANT

## 2024-04-13 PROCEDURE — 99232 SBSQ HOSP IP/OBS MODERATE 35: CPT

## 2024-04-13 PROCEDURE — 82248 BILIRUBIN DIRECT: CPT | Performed by: STUDENT IN AN ORGANIZED HEALTH CARE EDUCATION/TRAINING PROGRAM

## 2024-04-13 PROCEDURE — 86901 BLOOD TYPING SEROLOGIC RH(D): CPT | Performed by: STUDENT IN AN ORGANIZED HEALTH CARE EDUCATION/TRAINING PROGRAM

## 2024-04-13 PROCEDURE — 2500000004 HC RX 250 GENERAL PHARMACY W/ HCPCS (ALT 636 FOR OP/ED): Mod: JZ | Performed by: STUDENT IN AN ORGANIZED HEALTH CARE EDUCATION/TRAINING PROGRAM

## 2024-04-13 PROCEDURE — 85610 PROTHROMBIN TIME: CPT | Performed by: STUDENT IN AN ORGANIZED HEALTH CARE EDUCATION/TRAINING PROGRAM

## 2024-04-13 PROCEDURE — 2500000005 HC RX 250 GENERAL PHARMACY W/O HCPCS: Performed by: STUDENT IN AN ORGANIZED HEALTH CARE EDUCATION/TRAINING PROGRAM

## 2024-04-13 PROCEDURE — 37799 UNLISTED PX VASCULAR SURGERY: CPT | Performed by: STUDENT IN AN ORGANIZED HEALTH CARE EDUCATION/TRAINING PROGRAM

## 2024-04-13 PROCEDURE — 2500000001 HC RX 250 WO HCPCS SELF ADMINISTERED DRUGS (ALT 637 FOR MEDICARE OP): Performed by: STUDENT IN AN ORGANIZED HEALTH CARE EDUCATION/TRAINING PROGRAM

## 2024-04-13 PROCEDURE — P9047 ALBUMIN (HUMAN), 25%, 50ML: HCPCS | Mod: JZ | Performed by: PHYSICIAN ASSISTANT

## 2024-04-13 PROCEDURE — C9113 INJ PANTOPRAZOLE SODIUM, VIA: HCPCS | Performed by: STUDENT IN AN ORGANIZED HEALTH CARE EDUCATION/TRAINING PROGRAM

## 2024-04-13 PROCEDURE — 94003 VENT MGMT INPAT SUBQ DAY: CPT

## 2024-04-13 PROCEDURE — 83690 ASSAY OF LIPASE: CPT | Performed by: NURSE PRACTITIONER

## 2024-04-13 PROCEDURE — 2500000001 HC RX 250 WO HCPCS SELF ADMINISTERED DRUGS (ALT 637 FOR MEDICARE OP): Performed by: NURSE PRACTITIONER

## 2024-04-13 PROCEDURE — P9047 ALBUMIN (HUMAN), 25%, 50ML: HCPCS | Mod: JZ | Performed by: NURSE PRACTITIONER

## 2024-04-13 PROCEDURE — 84132 ASSAY OF SERUM POTASSIUM: CPT | Performed by: STUDENT IN AN ORGANIZED HEALTH CARE EDUCATION/TRAINING PROGRAM

## 2024-04-13 PROCEDURE — 99291 CRITICAL CARE FIRST HOUR: CPT | Performed by: STUDENT IN AN ORGANIZED HEALTH CARE EDUCATION/TRAINING PROGRAM

## 2024-04-13 PROCEDURE — 82947 ASSAY GLUCOSE BLOOD QUANT: CPT

## 2024-04-13 PROCEDURE — 71045 X-RAY EXAM CHEST 1 VIEW: CPT

## 2024-04-13 PROCEDURE — 71045 X-RAY EXAM CHEST 1 VIEW: CPT | Performed by: RADIOLOGY

## 2024-04-13 RX ORDER — HEPARIN SODIUM 1000 [USP'U]/ML
INJECTION, SOLUTION INTRAVENOUS; SUBCUTANEOUS
Status: DISPENSED
Start: 2024-04-13 | End: 2024-04-14

## 2024-04-13 RX ORDER — ALBUMIN HUMAN 250 G/1000ML
25 SOLUTION INTRAVENOUS EVERY 6 HOURS
Status: COMPLETED | OUTPATIENT
Start: 2024-04-13 | End: 2024-04-14

## 2024-04-13 RX ORDER — SIMETHICONE 80 MG
40 TABLET,CHEWABLE ORAL EVERY 6 HOURS
Status: COMPLETED | OUTPATIENT
Start: 2024-04-13 | End: 2024-04-14

## 2024-04-13 RX ORDER — MEROPENEM 1 G/1
1 INJECTION, POWDER, FOR SOLUTION INTRAVENOUS EVERY 12 HOURS
Status: DISCONTINUED | OUTPATIENT
Start: 2024-04-14 | End: 2024-04-16

## 2024-04-13 RX ADMIN — Medication 1 G: at 13:39

## 2024-04-13 RX ADMIN — HEPARIN SODIUM 5000 UNITS: 5000 INJECTION INTRAVENOUS; SUBCUTANEOUS at 13:41

## 2024-04-13 RX ADMIN — Medication: at 08:00

## 2024-04-13 RX ADMIN — OXYCODONE HYDROCHLORIDE 5 MG: 5 TABLET ORAL at 17:15

## 2024-04-13 RX ADMIN — HYDROCORTISONE SODIUM SUCCINATE 50 MG: 100 INJECTION, POWDER, FOR SOLUTION INTRAMUSCULAR; INTRAVENOUS at 17:15

## 2024-04-13 RX ADMIN — SIMETHICONE 40 MG: 80 TABLET, CHEWABLE ORAL at 13:39

## 2024-04-13 RX ADMIN — ALBUMIN HUMAN 25 G: 0.25 SOLUTION INTRAVENOUS at 06:01

## 2024-04-13 RX ADMIN — OXYCODONE HYDROCHLORIDE 5 MG: 5 TABLET ORAL at 23:00

## 2024-04-13 RX ADMIN — ALBUMIN HUMAN 25 G: 0.25 SOLUTION INTRAVENOUS at 12:14

## 2024-04-13 RX ADMIN — CALCIUM GLUCONATE 1 G: 20 INJECTION, SOLUTION INTRAVENOUS at 10:09

## 2024-04-13 RX ADMIN — INSULIN LISPRO 8 UNITS: 100 INJECTION, SOLUTION INTRAVENOUS; SUBCUTANEOUS at 17:00

## 2024-04-13 RX ADMIN — HEPARIN SODIUM 5000 UNITS: 5000 INJECTION INTRAVENOUS; SUBCUTANEOUS at 22:21

## 2024-04-13 RX ADMIN — SIMETHICONE 40 MG: 80 TABLET, CHEWABLE ORAL at 17:15

## 2024-04-13 RX ADMIN — MIDODRINE HYDROCHLORIDE 20 MG: 10 TABLET ORAL at 17:15

## 2024-04-13 RX ADMIN — OXYCODONE HYDROCHLORIDE 5 MG: 5 TABLET ORAL at 12:13

## 2024-04-13 RX ADMIN — INSULIN LISPRO 8 UNITS: 100 INJECTION, SOLUTION INTRAVENOUS; SUBCUTANEOUS at 12:13

## 2024-04-13 RX ADMIN — HYDROCORTISONE SODIUM SUCCINATE 50 MG: 100 INJECTION, POWDER, FOR SOLUTION INTRAMUSCULAR; INTRAVENOUS at 22:21

## 2024-04-13 RX ADMIN — INSULIN LISPRO 12 UNITS: 100 INJECTION, SOLUTION INTRAVENOUS; SUBCUTANEOUS at 20:20

## 2024-04-13 RX ADMIN — ALBUMIN HUMAN 25 G: 0.25 SOLUTION INTRAVENOUS at 17:15

## 2024-04-13 RX ADMIN — HEPARIN SODIUM 5000 UNITS: 5000 INJECTION INTRAVENOUS; SUBCUTANEOUS at 06:01

## 2024-04-13 RX ADMIN — FLUDROCORTISONE ACETATE 0.1 MG: 0.1 TABLET ORAL at 12:13

## 2024-04-13 RX ADMIN — INSULIN GLARGINE 16 UNITS: 100 INJECTION, SOLUTION SUBCUTANEOUS at 20:20

## 2024-04-13 RX ADMIN — INSULIN LISPRO 8 UNITS: 100 INJECTION, SOLUTION INTRAVENOUS; SUBCUTANEOUS at 23:36

## 2024-04-13 RX ADMIN — OXYCODONE HYDROCHLORIDE 5 MG: 5 TABLET ORAL at 06:01

## 2024-04-13 RX ADMIN — HYDROCORTISONE SODIUM SUCCINATE 50 MG: 100 INJECTION, POWDER, FOR SOLUTION INTRAMUSCULAR; INTRAVENOUS at 10:09

## 2024-04-13 RX ADMIN — MIDODRINE HYDROCHLORIDE 20 MG: 10 TABLET ORAL at 03:40

## 2024-04-13 RX ADMIN — Medication 1 G: at 06:03

## 2024-04-13 RX ADMIN — INSULIN GLARGINE 16 UNITS: 100 INJECTION, SOLUTION SUBCUTANEOUS at 08:50

## 2024-04-13 RX ADMIN — INSULIN LISPRO 4 UNITS: 100 INJECTION, SOLUTION INTRAVENOUS; SUBCUTANEOUS at 03:41

## 2024-04-13 RX ADMIN — FLUDROCORTISONE ACETATE 0.1 MG: 0.1 TABLET ORAL at 03:40

## 2024-04-13 RX ADMIN — INSULIN LISPRO 4 UNITS: 100 INJECTION, SOLUTION INTRAVENOUS; SUBCUTANEOUS at 08:50

## 2024-04-13 RX ADMIN — INSULIN LISPRO 8 UNITS: 100 INJECTION, SOLUTION INTRAVENOUS; SUBCUTANEOUS at 00:09

## 2024-04-13 RX ADMIN — PANTOPRAZOLE SODIUM 40 MG: 40 INJECTION, POWDER, FOR SOLUTION INTRAVENOUS at 08:51

## 2024-04-13 RX ADMIN — MIDODRINE HYDROCHLORIDE 20 MG: 10 TABLET ORAL at 10:09

## 2024-04-13 RX ADMIN — HYDROCORTISONE SODIUM SUCCINATE 50 MG: 100 INJECTION, POWDER, FOR SOLUTION INTRAMUSCULAR; INTRAVENOUS at 03:49

## 2024-04-13 ASSESSMENT — PAIN - FUNCTIONAL ASSESSMENT
PAIN_FUNCTIONAL_ASSESSMENT: 0-10

## 2024-04-13 ASSESSMENT — PAIN SCALES - GENERAL
PAINLEVEL_OUTOF10: 0 - NO PAIN

## 2024-04-13 NOTE — PROGRESS NOTES
"    Department of Plastic and Reconstructive Surgery  Daily Progress Note    Patient Name: Jason Hollins  MRN: [id number]  Date:  04/13/24     Subjective  Found resting in bed comfortably, sister in law at bedside. Remains critically ill, trach to vent in [facility]. [facility]ablo dialysis being initiated on assessment. Following commands and giving thumbs up appropriately when being asked questions.     Overnight Events  NAOE    Objective    Vital Signs  /55   Pulse 86   Temp 36 °C (96.8 °F) (Temporal)   Resp 20   Ht 1.778 m (5' 10\")   Wt 126 kg (277 lb 9 oz)   SpO2 97%   BMI 39.83 kg/m²      Physical Exam   Constitutional: Alert, lying in pressure relieving bed in ICU, appears critically ill.   ENMT: MMM, dobhoff in R nare.  Cardiovascular: RRR on telemetry monitor.  Respiratory/Thorax: Trach to vent.  Gastrointestinal: Abdomen soft, protuberant.   Musculoskeletal: Able to squeeze bilateral hands, wiggles toes on the left, minimal movement on RLE, effort to move foot observed.   Extremities: Generalized pitting edema. Right thigh edematous, but soft and compressible, no bruising or tissue induration noted. Visible portion of flap recipient site and adjacent anterior R thigh incisions appear stable. Posterior flap incision line and posterior thigh/gluteal region incision dressed with incisional wound vac, maintaining low continuous suction at -75mmHg, no alarms for leak, obstruction or malfunction, expectantly no output in collecting canister. ANDERSON drain x 3 right upper leg with dark serous outputs.  Neurological: Off sedation, alert and able to respond to commands with head nod.   Skin: Edematous, juandice, warm.   Diagnostics   Results for orders placed or performed during the hospital encounter of 03/05/24 (from the past 24 hour(s))   Urinalysis with Reflex Culture and Microscopic   Result Value Ref Range    Color, Urine Dark-Orange (N) Light-Yellow, Yellow, Dark-Yellow    Appearance, Urine Ex.Turbid (N) Clear    " Specific Gravity, Urine 1.014 1.005 - 1.035    pH, Urine 6.5 5.0, 5.5, 6.0, 6.5, 7.0, 7.5, 8.0    Protein, Urine 100 (2+) (A) NEGATIVE, 10 (TRACE), 20 (TRACE) mg/dL    Glucose, Urine 300 (3+) (A) Normal mg/dL    Blood, Urine OVER (3+) (A) NEGATIVE    Ketones, Urine NEGATIVE NEGATIVE mg/dL    Bilirubin, Urine 0.5 (1+) (A) NEGATIVE    Urobilinogen, Urine 12 (3+) (A) Normal mg/dL    Nitrite, Urine NEGATIVE NEGATIVE    Leukocyte Esterase, Urine 75 Angelia/µL (A) NEGATIVE   Extra Urine Gray Tube   Result Value Ref Range    Extra Tube Hold for add-ons.    Microscopic Only, Urine   Result Value Ref Range    WBC, Urine >50 (A) 1-5, NONE /HPF    RBC, Urine >20 (A) NONE, 1-2, 3-5 /HPF   Urine Culture    Specimen: Straight Catheter; Urine   Result Value Ref Range    Urine Culture No growth    POCT GLUCOSE   Result Value Ref Range    POCT Glucose 185 (H) 74 - 99 mg/dL   POCT GLUCOSE   Result Value Ref Range    POCT Glucose 201 (H) 74 - 99 mg/dL   POCT GLUCOSE   Result Value Ref Range    POCT Glucose 178 (H) 74 - 99 mg/dL   Blood Gas Arterial Full Panel   Result Value Ref Range    POCT pH, Arterial 7.46 (H) 7.38 - 7.42 pH    POCT pCO2, Arterial 35 (L) 38 - 42 mm Hg    POCT pO2, Arterial 63 (L) 85 - 95 mm Hg    POCT SO2, Arterial 94 94 - 100 %    POCT Oxy Hemoglobin, Arterial 90.3 (L) 94.0 - 98.0 %    POCT Hematocrit Calculated, Arterial 31.0 (L) 41.0 - 52.0 %    POCT Sodium, Arterial 131 (L) 136 - 145 mmol/L    POCT Potassium, Arterial 3.5 3.5 - 5.3 mmol/L    POCT Chloride, Arterial 98 98 - 107 mmol/L    POCT Ionized Calcium, Arterial 1.11 1.10 - 1.33 mmol/L    POCT Glucose, Arterial 168 (H) 74 - 99 mg/dL    POCT Lactate, Arterial 2.6 (H) 0.4 - 2.0 mmol/L    POCT Base Excess, Arterial 1.2 -2.0 - 3.0 mmol/L    POCT HCO3 Calculated, Arterial 24.9 22.0 - 26.0 mmol/L    POCT Hemoglobin, Arterial 10.4 (L) 13.5 - 17.5 g/dL    POCT Anion Gap, Arterial 12 10 - 25 mmo/L    Patient Temperature 37.0 degrees Celsius    FiO2 40 %   CALCIUM,  IONIZED   Result Value Ref Range    POCT Calcium, Ionized 1.05 (L) 1.1 - 1.33 mmol/L   Renal Function Panel   Result Value Ref Range    Glucose 167 (H) 74 - 99 mg/dL    Sodium 134 (L) 136 - 145 mmol/L    Potassium 3.5 3.5 - 5.3 mmol/L    Chloride 98 98 - 107 mmol/L    Bicarbonate 25 21 - 32 mmol/L    Anion Gap 15 10 - 20 mmol/L    Urea Nitrogen 29 (H) 6 - 23 mg/dL    Creatinine 1.30 0.50 - 1.30 mg/dL    eGFR 62 >60 mL/min/1.73m*2    Calcium 8.2 (L) 8.6 - 10.6 mg/dL    Phosphorus 2.7 2.5 - 4.9 mg/dL    Albumin 2.8 (L) 3.4 - 5.0 g/dL   POCT GLUCOSE   Result Value Ref Range    POCT Glucose 189 (H) 74 - 99 mg/dL   Blood Gas Arterial Full Panel   Result Value Ref Range    POCT pH, Arterial 7.43 (H) 7.38 - 7.42 pH    POCT pCO2, Arterial 36 (L) 38 - 42 mm Hg    POCT pO2, Arterial 60 (L) 85 - 95 mm Hg    POCT SO2, Arterial 92 (L) 94 - 100 %    POCT Oxy Hemoglobin, Arterial 88.7 (L) 94.0 - 98.0 %    POCT Hematocrit Calculated, Arterial 30.0 (L) 41.0 - 52.0 %    POCT Sodium, Arterial 132 (L) 136 - 145 mmol/L    POCT Potassium, Arterial 3.2 (L) 3.5 - 5.3 mmol/L    POCT Chloride, Arterial 100 98 - 107 mmol/L    POCT Ionized Calcium, Arterial 1.09 (L) 1.10 - 1.33 mmol/L    POCT Glucose, Arterial 188 (H) 74 - 99 mg/dL    POCT Lactate, Arterial 1.9 0.4 - 2.0 mmol/L    POCT Base Excess, Arterial -0.2 -2.0 - 3.0 mmol/L    POCT HCO3 Calculated, Arterial 23.9 22.0 - 26.0 mmol/L    POCT Hemoglobin, Arterial 10.1 (L) 13.5 - 17.5 g/dL    POCT Anion Gap, Arterial 11 10 - 25 mmo/L    Patient Temperature 37.0 degrees Celsius    FiO2 40 %   POCT GLUCOSE   Result Value Ref Range    POCT Glucose 220 (H) 74 - 99 mg/dL   POCT GLUCOSE   Result Value Ref Range    POCT Glucose 198 (H) 74 - 99 mg/dL   Blood Gas Arterial Full Panel   Result Value Ref Range    POCT pH, Arterial 7.30 (L) 7.38 - 7.42 pH    POCT pCO2, Arterial 51 (H) 38 - 42 mm Hg    POCT pO2, Arterial 58 (L) 85 - 95 mm Hg    POCT SO2, Arterial 88 (L) 94 - 100 %    POCT Oxy Hemoglobin,  Arterial 84.7 (L) 94.0 - 98.0 %    POCT Hematocrit Calculated, Arterial 31.0 (L) 41.0 - 52.0 %    POCT Sodium, Arterial 130 (L) 136 - 145 mmol/L    POCT Potassium, Arterial 3.8 3.5 - 5.3 mmol/L    POCT Chloride, Arterial 98 98 - 107 mmol/L    POCT Ionized Calcium, Arterial 1.16 1.10 - 1.33 mmol/L    POCT Glucose, Arterial 215 (H) 74 - 99 mg/dL    POCT Lactate, Arterial 1.4 0.4 - 2.0 mmol/L    POCT Base Excess, Arterial -1.7 -2.0 - 3.0 mmol/L    POCT HCO3 Calculated, Arterial 25.1 22.0 - 26.0 mmol/L    POCT Hemoglobin, Arterial 10.3 (L) 13.5 - 17.5 g/dL    POCT Anion Gap, Arterial 11 10 - 25 mmo/L    Patient Temperature 37.0 degrees Celsius    FiO2 40 %   CALCIUM, IONIZED   Result Value Ref Range    POCT Calcium, Ionized 1.06 (L) 1.1 - 1.33 mmol/L   CBC   Result Value Ref Range    WBC 11.0 4.4 - 11.3 x10*3/uL    nRBC 0.4 (H) 0.0 - 0.0 /100 WBCs    RBC 3.49 (L) 4.50 - 5.90 x10*6/uL    Hemoglobin 10.0 (L) 13.5 - 17.5 g/dL    Hematocrit 27.8 (L) 41.0 - 52.0 %    MCV 80 80 - 100 fL    MCH 28.7 26.0 - 34.0 pg    MCHC 36.0 32.0 - 36.0 g/dL    RDW 20.6 (H) 11.5 - 14.5 %    Platelets 98 (L) 150 - 450 x10*3/uL   Coagulation Screen   Result Value Ref Range    Protime 15.1 (H) 9.8 - 12.8 seconds    INR 1.3 (H) 0.9 - 1.1    aPTT 37 27 - 38 seconds   Hepatic function panel   Result Value Ref Range    Albumin 3.1 (L) 3.4 - 5.0 g/dL    Bilirubin, Total 28.8 (H) 0.0 - 1.2 mg/dL    Bilirubin, Direct 19.8 (H) 0.0 - 0.3 mg/dL    Alkaline Phosphatase 197 (H) 33 - 136 U/L    ALT 6 (L) 10 - 52 U/L    AST 70 (H) 9 - 39 U/L    Total Protein 5.7 (L) 6.4 - 8.2 g/dL   Magnesium   Result Value Ref Range    Magnesium 2.19 1.60 - 2.40 mg/dL   POCT GLUCOSE   Result Value Ref Range    POCT Glucose 189 (H) 74 - 99 mg/dL     Current Medications  Scheduled medications  fludrocortisone, 0.1 mg, nasogastric tube, q12h  heparin (porcine), 5,000 Units, subcutaneous, q8h  hydrocortisone sodium succinate, 50 mg, intravenous, q6h  insulin glargine, 16 Units,  subcutaneous, q12h  insulin lispro, 0-20 Units, subcutaneous, q4h  meropenem, 1 g, intravenous, q8h  midodrine, 20 mg, orogastric tube, q8h  oxyCODONE, 5 mg, oral, q6h  oxygen, , inhalation, Continuous - Inhalation  pantoprazole, 40 mg, intravenous, Daily      Continuous medications  lactated Ringer's, 5 mL/hr, Last Rate: 5 mL/hr (04/13/24 0800)  norepinephrine, 0.01-0.5 mcg/kg/min (Dosing Weight), Last Rate: Stopped (04/12/24 2300)      PRN medications  PRN medications: alteplase, calcium gluconate, calcium gluconate, dextrose, glucagon, HYDROmorphone, HYDROmorphone, magnesium sulfate, magnesium sulfate, sodium chloride     Assessment  Jason Hollins 63 y.o. 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 Hawkins and Dr. Smith on 3/5/24. Postoperative course c/b arrest requiring CPR with ROSC after TPA for assumed large PE on 3/20. Increased swelling at flap site appreciated overnight 3/20-3/21 and pt returned to OR for exploration of R flap site, evacuation of hematoma, suture ligation of multiple bleeding vessel sites in gluteal area and wound closure with Dr. Smith on 3/21. Pt has remained in ICU for continued critical care evaluation and management postoperatively. Returned to OR 4/11 with plastic surgery for R posterior thigh/gluteal region I&D with tissue advancement/complex closure and application of incisional wound vac.     Plan/Recommendations  - No additional acute surgical interventions planned from plastic surgery perspective   - Maintain incisional wound vac to R posterior thigh wound site per plastic surgery x14 (4/25) days post-op         ·  Settings: -75 mmHg low continuous suction        ·  Monitor/record vac canister output [id number]       ·  Notify plastic surgery with any concerns of leak or obstruction  -  Continue ANDERSON drain care to x3 drains present at R thigh flap reconstruction site      ·  Strip drain tubing TID and PRN     ·  Monitor and record output q8h      ·  Keep drain sites C/D/I      ·  Notify plastics if ANDERSON drain output exceeds > 100 ml/hr in one drain or > 300 ml/hr collectively  - Avoid pressure application or positioning onto flap site or incisions at R upper leg   - Recommend patient be positioned off of R side as able to prevent flap compression/wound breakdown   - Continue clinitron fluidized air mattress  - Appreciate remaining supportive care per ICU   - Plastic Surgery will continue to follow      Patient and plan discussed with Dr. Smith.      PHILLIP Dalal-CNP  Plastic and Reconstructive Surgery   [place]  Pager #[phone]  Team phones: [phone]

## 2024-04-13 NOTE — PROGRESS NOTES
"Jason Hollins is a 63 y.o. male on day 39 of admission presenting with Soft tissue sarcoma (Multi).    Subjective   Overnight, patient was on Tablo with 2L removed. Briefly required Levo 0.01. This morning, patient reports no significant pain in abdomen or surgical site. Denies nausea and has had multiple loose bowel movements. Denies chest pain or difficulty breathing       Objective     Physical Exam  Eyes:      Extraocular Movements: Extraocular movements intact.   Cardiovascular:      Rate and Rhythm: Normal rate and regular rhythm.   Pulmonary:      Comments: Inspiratory crackles and diminished breath sounds auscultated bilaterally  Abdominal:      General: There is no distension.      Comments: Abdomen compressible, nonrigid, non tender, normoactive bowel sounds   Musculoskeletal:      Comments: Significant pitting edema in upper and lower extremities bilaterally. Wound vac and 3 ANDERSON drains in place without surrounding erythema. Mild swelling noted around wound site (consistent with prior day)   Skin:     General: Skin is warm.   Neurological:      Mental Status: He is alert.         Last Recorded Vitals  Blood pressure 107/54, pulse 80, temperature 36 °C (96.8 °F), temperature source Temporal, resp. rate (!) 0, height 1.778 m (5' 10\"), weight 119 kg (261 lb 14.5 oz), SpO2 99%.  Intake/Output last 3 Shifts:  I/O last 3 completed shifts:  In: 3605.9 (28.6 mL/kg) [I.V.:225.9 (1.8 mL/kg); Blood:300; NG/GT:1830; IV Piggyback:1250]  Out: 4270 (33.9 mL/kg) [Urine:75 (0 mL/kg/hr); Drains:495; Other:3700]  Weight: 125.9 kg     Relevant Results  Scheduled medications  albumin human, 25 g, intravenous, q6h  fludrocortisone, 0.1 mg, nasogastric tube, q12h  heparin (porcine), 5,000 Units, subcutaneous, q8h  hydrocortisone sodium succinate, 50 mg, intravenous, q6h  insulin glargine, 16 Units, subcutaneous, q12h  insulin lispro, 0-20 Units, subcutaneous, q4h  meropenem, 1 g, intravenous, q8h  midodrine, 20 mg, orogastric tube, " q8h  oxyCODONE, 5 mg, oral, q6h  oxygen, , inhalation, Continuous - Inhalation  pantoprazole, 40 mg, intravenous, Daily  simethicone, 40 mg, nasogastric tube, q6h      Continuous medications  lactated Ringer's, 5 mL/hr, Last Rate: 5 mL/hr (04/13/24 1300)  norepinephrine, 0.01-0.5 mcg/kg/min (Dosing Weight), Last Rate: Stopped (04/13/24 1356)      PRN medications  PRN medications: alteplase, calcium gluconate, calcium gluconate, dextrose, glucagon, HYDROmorphone, HYDROmorphone, magnesium sulfate, magnesium sulfate, sodium chloride  Results for orders placed or performed during the hospital encounter of 03/05/24 (from the past 24 hour(s))   POCT GLUCOSE   Result Value Ref Range    POCT Glucose 178 (H) 74 - 99 mg/dL   Blood Gas Arterial Full Panel   Result Value Ref Range    POCT pH, Arterial 7.46 (H) 7.38 - 7.42 pH    POCT pCO2, Arterial 35 (L) 38 - 42 mm Hg    POCT pO2, Arterial 63 (L) 85 - 95 mm Hg    POCT SO2, Arterial 94 94 - 100 %    POCT Oxy Hemoglobin, Arterial 90.3 (L) 94.0 - 98.0 %    POCT Hematocrit Calculated, Arterial 31.0 (L) 41.0 - 52.0 %    POCT Sodium, Arterial 131 (L) 136 - 145 mmol/L    POCT Potassium, Arterial 3.5 3.5 - 5.3 mmol/L    POCT Chloride, Arterial 98 98 - 107 mmol/L    POCT Ionized Calcium, Arterial 1.11 1.10 - 1.33 mmol/L    POCT Glucose, Arterial 168 (H) 74 - 99 mg/dL    POCT Lactate, Arterial 2.6 (H) 0.4 - 2.0 mmol/L    POCT Base Excess, Arterial 1.2 -2.0 - 3.0 mmol/L    POCT HCO3 Calculated, Arterial 24.9 22.0 - 26.0 mmol/L    POCT Hemoglobin, Arterial 10.4 (L) 13.5 - 17.5 g/dL    POCT Anion Gap, Arterial 12 10 - 25 mmo/L    Patient Temperature 37.0 degrees Celsius    FiO2 40 %   CALCIUM, IONIZED   Result Value Ref Range    POCT Calcium, Ionized 1.05 (L) 1.1 - 1.33 mmol/L   Renal Function Panel   Result Value Ref Range    Glucose 167 (H) 74 - 99 mg/dL    Sodium 134 (L) 136 - 145 mmol/L    Potassium 3.5 3.5 - 5.3 mmol/L    Chloride 98 98 - 107 mmol/L    Bicarbonate 25 21 - 32 mmol/L     Anion Gap 15 10 - 20 mmol/L    Urea Nitrogen 29 (H) 6 - 23 mg/dL    Creatinine 1.30 0.50 - 1.30 mg/dL    eGFR 62 >60 mL/min/1.73m*2    Calcium 8.2 (L) 8.6 - 10.6 mg/dL    Phosphorus 2.7 2.5 - 4.9 mg/dL    Albumin 2.8 (L) 3.4 - 5.0 g/dL   POCT GLUCOSE   Result Value Ref Range    POCT Glucose 189 (H) 74 - 99 mg/dL   Blood Gas Arterial Full Panel   Result Value Ref Range    POCT pH, Arterial 7.43 (H) 7.38 - 7.42 pH    POCT pCO2, Arterial 36 (L) 38 - 42 mm Hg    POCT pO2, Arterial 60 (L) 85 - 95 mm Hg    POCT SO2, Arterial 92 (L) 94 - 100 %    POCT Oxy Hemoglobin, Arterial 88.7 (L) 94.0 - 98.0 %    POCT Hematocrit Calculated, Arterial 30.0 (L) 41.0 - 52.0 %    POCT Sodium, Arterial 132 (L) 136 - 145 mmol/L    POCT Potassium, Arterial 3.2 (L) 3.5 - 5.3 mmol/L    POCT Chloride, Arterial 100 98 - 107 mmol/L    POCT Ionized Calcium, Arterial 1.09 (L) 1.10 - 1.33 mmol/L    POCT Glucose, Arterial 188 (H) 74 - 99 mg/dL    POCT Lactate, Arterial 1.9 0.4 - 2.0 mmol/L    POCT Base Excess, Arterial -0.2 -2.0 - 3.0 mmol/L    POCT HCO3 Calculated, Arterial 23.9 22.0 - 26.0 mmol/L    POCT Hemoglobin, Arterial 10.1 (L) 13.5 - 17.5 g/dL    POCT Anion Gap, Arterial 11 10 - 25 mmo/L    Patient Temperature 37.0 degrees Celsius    FiO2 40 %   POCT GLUCOSE   Result Value Ref Range    POCT Glucose 220 (H) 74 - 99 mg/dL   POCT GLUCOSE   Result Value Ref Range    POCT Glucose 198 (H) 74 - 99 mg/dL   Blood Gas Arterial Full Panel   Result Value Ref Range    POCT pH, Arterial 7.30 (L) 7.38 - 7.42 pH    POCT pCO2, Arterial 51 (H) 38 - 42 mm Hg    POCT pO2, Arterial 58 (L) 85 - 95 mm Hg    POCT SO2, Arterial 88 (L) 94 - 100 %    POCT Oxy Hemoglobin, Arterial 84.7 (L) 94.0 - 98.0 %    POCT Hematocrit Calculated, Arterial 31.0 (L) 41.0 - 52.0 %    POCT Sodium, Arterial 130 (L) 136 - 145 mmol/L    POCT Potassium, Arterial 3.8 3.5 - 5.3 mmol/L    POCT Chloride, Arterial 98 98 - 107 mmol/L    POCT Ionized Calcium, Arterial 1.16 1.10 - 1.33 mmol/L     POCT Glucose, Arterial 215 (H) 74 - 99 mg/dL    POCT Lactate, Arterial 1.4 0.4 - 2.0 mmol/L    POCT Base Excess, Arterial -1.7 -2.0 - 3.0 mmol/L    POCT HCO3 Calculated, Arterial 25.1 22.0 - 26.0 mmol/L    POCT Hemoglobin, Arterial 10.3 (L) 13.5 - 17.5 g/dL    POCT Anion Gap, Arterial 11 10 - 25 mmo/L    Patient Temperature 37.0 degrees Celsius    FiO2 40 %   CALCIUM, IONIZED   Result Value Ref Range    POCT Calcium, Ionized 1.06 (L) 1.1 - 1.33 mmol/L   CBC   Result Value Ref Range    WBC 11.0 4.4 - 11.3 x10*3/uL    nRBC 0.4 (H) 0.0 - 0.0 /100 WBCs    RBC 3.49 (L) 4.50 - 5.90 x10*6/uL    Hemoglobin 10.0 (L) 13.5 - 17.5 g/dL    Hematocrit 27.8 (L) 41.0 - 52.0 %    MCV 80 80 - 100 fL    MCH 28.7 26.0 - 34.0 pg    MCHC 36.0 32.0 - 36.0 g/dL    RDW 20.6 (H) 11.5 - 14.5 %    Platelets 98 (L) 150 - 450 x10*3/uL   Coagulation Screen   Result Value Ref Range    Protime 15.1 (H) 9.8 - 12.8 seconds    INR 1.3 (H) 0.9 - 1.1    aPTT 37 27 - 38 seconds   Hepatic function panel   Result Value Ref Range    Albumin 3.1 (L) 3.4 - 5.0 g/dL    Bilirubin, Total 28.8 (H) 0.0 - 1.2 mg/dL    Bilirubin, Direct 19.8 (H) 0.0 - 0.3 mg/dL    Alkaline Phosphatase 197 (H) 33 - 136 U/L    ALT 6 (L) 10 - 52 U/L    AST 70 (H) 9 - 39 U/L    Total Protein 5.7 (L) 6.4 - 8.2 g/dL   Magnesium   Result Value Ref Range    Magnesium 2.19 1.60 - 2.40 mg/dL   POCT GLUCOSE   Result Value Ref Range    POCT Glucose 189 (H) 74 - 99 mg/dL   Renal Function Panel   Result Value Ref Range    Glucose 243 (H) 74 - 99 mg/dL    Sodium 134 (L) 136 - 145 mmol/L    Potassium 3.7 3.5 - 5.3 mmol/L    Chloride 97 (L) 98 - 107 mmol/L    Bicarbonate 26 21 - 32 mmol/L    Anion Gap 15 10 - 20 mmol/L    Urea Nitrogen 42 (H) 6 - 23 mg/dL    Creatinine 1.58 (H) 0.50 - 1.30 mg/dL    eGFR 49 (L) >60 mL/min/1.73m*2    Calcium 8.7 8.6 - 10.6 mg/dL    Phosphorus 3.8 2.5 - 4.9 mg/dL    Albumin 3.0 (L) 3.4 - 5.0 g/dL   Amylase   Result Value Ref Range    Amylase 27 (L) 29 - 103 U/L    Lipase   Result Value Ref Range    Lipase 18 9 - 82 U/L   POCT GLUCOSE   Result Value Ref Range    POCT Glucose 231 (H) 74 - 99 mg/dL           Assessment/Plan   Principal Problem:    Soft tissue sarcoma (Multi)  Active Problems:    Acute kidney injury (nontraumatic) (CMS-HCC)    Pulmonary embolus (Multi)    Anemia    Thrombocytopenia (CMS-HCC)    Current chronic use of systemic steroids    Gastroesophageal reflux disease without esophagitis    Extraskeletal myxoid chondrosarcoma (Multi)    Cardiopulmonary arrest (Multi)    Acute respiratory failure with hypoxia (Multi)    Tracheostomy hemorrhage (Multi)    Postoperative wound breakdown, initial encounter    Postoperative wound breakdown, sequela    Jason Hollins is a 63 y.o. 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 Hawkins and Dr. Smith on 3/5/24.  Post operative course was uncomplicated and patient was on RNF until 3/20 for prolonged bed rest to avoid hip flexion to maintain flap integrity.  Patient was on prophylactic lovenox while on bedrest.      3/20: Cardiopulmonary arrest s/p CPR with ROSC after TPA, overnight started on heparin, epo, increased pressor requirements, increased swelling at flap site.      3/21: Hemorrhagic shock, MTP. Firm and large right flap site. Return to OR s/p Exploration of right thigh wound, Evacuation of hematoma. Suture ligation of multiple bleeding vessel and several points in gluteal area.      4/1: s/p Trach     4/4: return OR with ENT s/p laryngoscopy, esophagoscopy and bronchoscopy, trach revision. Found extensive clot burden in esophagus and stomach as well as in the lungs. Oozing at thyroid bed cauterized. Trach exchanged to new 6.0 prox XLT shiley. OR findings:  blood up glottis and from thyroid bed to airway.     4/9: Patient  is medically stable for OR on 4/11/24.  He is appropriately n.p.o. since midnight and has had more DVT prophylaxis held for OR today.     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 Hawkins and Dr. Smith on 3/5/24 s/p cardiopulmonary arrest with ROSC 3/20. CT head 3/22 without acute process. Weaned off cisatracurium and propofol overnight 3/23-3/24. Ketamine weaned off 3/25 and Fentanyl weaned off 3/26 am. Repeat CT Head 3/26 without acute process. MRI Brain 3/30, negative for cerebral infarction or hemorrhage. Neuro exam - off sedation, following commands except for RLE, tracking, nodding/shaking head to questions  - Post op pain - Oxycodone 5mg q6h + Dilaudid PRN (not used PRNs overnight)  - ongoing neuro and pain assessments  - keep off sedation  - PT/OT -> AROM     CV: History of HTN, HLD. Acute cardiopulmonary arrest 2/2 to possible massive PE vs massive STEMI, received multiple rounds of CPR and one defibrillation.  Sustained ROSC achieved after TPA bolus. On high dose levophed, epinephrine, vaso, angioT2. Plan on 3/21 was for ECMO which was aborted secondary to large hemhorrge at right flap site. Had MTP and taken OR with 5L evacuated. ECHO 3/21: Normal LV, Mod enlarged RV, slight suggestion of a Townsend's sign / RV strain, low normal RV function. ECHO 3/22 with hyperdynamic LV. New onset Afib with RVR overnight 3/22-3/23, dosed with 150mg amio x2, started infusion at 1mg/min thereafter. AT2 weaned off. Amio infusion discontinued 3/25. Lactate downtrending. Vaso and epi weaned off. Remains in NSR. iEpo weaned off 3/27. Repeat TTE 3/29 and 4/2 essentially unchanged. Levo resumed 4/2 evening to continue aggressive fluid removal. Repeat TTE on 4/10 with LVEF 65-70% and RV difficulty to assess.  - continuous  EKG/abp/cvp monitoring  - Goal map range 65-90  -midodrine to 20 mg 3 times daily  - Continue florinef 0.1mg BID   -Continue stress dose steroids to 50 mg q 6h - will consider converting to PO Prednisone for tapering  - Ordered concentrated albumin 25g q6h for 24 hr  - On Levo 0.01 for BP support this AM to maintain goal MAPs - will wean Levo as tolerated  - Holding heparin infusion for now      PULM: Arrived to SICU intubated julio c-CPR. Concern for massive PE. Started on iEpo, currently on 0.05. Hypoxic respiratory failure. Severe ARDS. ETT exchanged 3/23. CT Chest 3/26 with interval JOHN consolidative/ground glass opacity. S/p Tracheostomy on 4/1. Bedside bronch -> some blood in trach, posterior wall tissue just distal to trach tip appears to be collapsing on cannula.  - ABG indicated 7.30/51/58 while on CPAP - patient switched to AC/VC Vt 500, PEEP 5, FiO2 40%  - ABGs prn  - additional pulm toilet prn -will discuss regimen with RT  - daily CXR -chest x-ray today indicates similar opacities most prominently in R mid-lower lung fields. Noticeable bilateral pleural effusion. POCUS at bedside indicated likely simple pleural effusion     ENT: Had epistaxis 3/23, completed afrin bid x3 days. S/p trach on 4/1. Bleeding from trach site 4/2 am, packing placed by ENT. Repeat episode of bloody tracheal secretions 4/3 through this am. Taken back to OR with ENT 4/4 as per above.  -No bleeding from trach site noted     GI: NPO. Shock liver, pancreatitis. Elevated TG. Elevated lactate. Consulted ACS for potential bowel ischemia as cause of persistent lactic acidosis. CT imaging 3/22 with evidence of pancreatitis. No acute surgical indication per ACS. RUQ US 3/22 with thickening of GB wall without cholelithiasis. CT A/P 3/26 negative for acute bleed. LFTs downtrending. Worsening hyperbilirubinemia. Amylase and lipase now WNL. Repeat RUQ US 4/1 with nonspecific gallbladder wall edema and thickening which may be 2/2 generalized  fluid overload, mild hepatomegaly with slight nodular contour and c/f steatosis and fibrosis, irregular hypoechoic region adjacent to hepatic veins. US liver with doppler 4/2 revealed hepatomegaly with nodular contour, mildly elevated RI in main and left hepatic arteries. Hyperbilirubinemia  - KUB indicated mild gaseous bowel distention - ordered Simethicone   - prostat daily  - PPI daily for GI prophy  - Trend LFTs daily -AST/ALT/total bilirubin is approximately similar to prior days.  Per discussion with hepatology yesterday, minor fluctuations are to be expected prior to eventual downtrend     : No history of renal disease. Baseline creatinine 0.6. Anuric RUTH. Elevated CK, downtrending. Metabolic acidosis, total body fluid overload, hyperkalemia. Started on CVVH 3/21, stopped bicarb infusion 3/22. Marino removed 3/24. Hyponatremia resolved. Stopped CVVH 4/2.  Has been tolerating SLED per nephrology recs  - Nephrology following - completing SLED during day today given plan for line holiday afterwards  - RFP BID and PRN  - Replete electrolytes judiciously - RFP to be drawn 4 hours after SLED for repletion of electrolytes  - Bladder scan pending this AM      HEME: Patient was on ppx lovenox for DVT prophylaxis prior to cardiopulmonary arrest. Concern for massive PE patient received bolus of TPA during CPR. Started on heparin infusion overnight given concern for PE. Hemorrhagic shock 3/21, MTP and back to OR s/p Exploration of right thigh woundEvacuation of hematoma. Suture ligation of multiple bleeding vessel and several points in gluteal area. Hematology consulted 3/22 to r/o HLH. Transfused 1 RBC overnight 3/22-3/23. Thrombocytopenia, coagulapathy, hypofibrinogenemia. LE Duplex 3/25 +acute occlusive R soleal DVT. Received 2u PRBC and 1u FFP on 3/26. Heparin infusion discontinued 3/26 to r/o HIT and transitioned to bival. PF4 negative, resumed heparin infuision 3/27. Elevated IL2R and decreased NK function. C/f  HLH (low suspicion), empirically treated with decadron (3/28-3/31). Thrombocytopenia on 3/30 to 18k, received 5pk, did not increment. Heparin held. Thrombocytopenia likely 2/2 to consumptive process, hematology following. Received IVIG and 5pk plts 3/31. Pancytopenia persists, improving thrombocytopenia  - cbc, coags bid and prn  -Hemoglobin stable at 10.0  (prior hemoglobin 9.9)  - SubQ Heparin restarted on 4/11 post op  - Hematology following, appreciate recs -> maintain Plt count >35k  - ongoing monitoring for s/s bleeding  - close surveillance of RLE and drains  - Vasc Med signed off 3/27  - maintain active T&S (4/13)     ENDO: History of DM2. A1c 7.5%. TSH WNL 1/2024. Hyperglycemia  - Restarted Lantus 16u BID   - q4h accuchecks and SSI #4     ID: Leukocytosis resolved, now with leukopenia. On broad antimicrobial therapy. MRSA screen + 2/28/24. Pan cultures sent 3/22. Sputum NTF, +MRSA screen (completed 5 day course mupirocin), UCx and BCx negative. Completed 7 day course vanc/zosyn 3/27. Febrile to 39.1 4/3, resent blood cultures and BAL and started vanco and zosyn. Switched zosyn to reynaldo, kept on vanco, added john. Blood cultures and sputum with Klebsiella. Repeat blood cultures sent 4/4.  - F/U cultures  - temp q4h, wbc daily  - Per ID - Meropenem 1g q8hr with end date 4/14/24 - discussed with pharmacy who recommended 1g BID dosing  - ID to follow up on wound cultures and given additional recs regarding Abx duration- will follow up  - Consider line holiday after SLED. If requiring Levo frequently for long periods of time, will place LIJ line and remove RIJ line  - ongoing monitoring for s/s infection  - plastics team monitoring wound site - no increased drainage noted in drains and swelling consistent with prior day. Plastics team is monitoring as well  - If continues to be hypotensive consider broaden abx/ add antifungal     Lines:  - right radial a line (4/5)  - RIJ trialysis (3/27)  - PIV x2    Patient  seen and discussed with Dr. Josh Dailey, PGY-1  SICU Team

## 2024-04-14 ENCOUNTER — APPOINTMENT (OUTPATIENT)
Dept: RADIOLOGY | Facility: HOSPITAL | Age: 64
DRG: 003 | End: 2024-04-14
Payer: COMMERCIAL

## 2024-04-14 LAB
ALBUMIN SERPL BCP-MCNC: 3.2 G/DL (ref 3.4–5)
ALBUMIN SERPL BCP-MCNC: 3.2 G/DL (ref 3.4–5)
ALP SERPL-CCNC: 177 U/L (ref 33–136)
ALT SERPL W P-5'-P-CCNC: 5 U/L (ref 10–52)
ANION GAP BLDA CALCULATED.4IONS-SCNC: 14 MMO/L (ref 10–25)
ANION GAP BLDA CALCULATED.4IONS-SCNC: 16 MMO/L (ref 10–25)
ANION GAP SERPL CALC-SCNC: 17 MMOL/L (ref 10–20)
ANION GAP SERPL CALC-SCNC: 18 MMOL/L (ref 10–20)
AST SERPL W P-5'-P-CCNC: 62 U/L (ref 9–39)
BASE EXCESS BLDA CALC-SCNC: -0.8 MMOL/L (ref -2–3)
BASE EXCESS BLDA CALC-SCNC: -1.9 MMOL/L (ref -2–3)
BILIRUB DIRECT SERPL-MCNC: 18.6 MG/DL (ref 0–0.3)
BILIRUB SERPL-MCNC: 26.8 MG/DL (ref 0–1.2)
BODY TEMPERATURE: 37 DEGREES CELSIUS
BODY TEMPERATURE: 37 DEGREES CELSIUS
BUN SERPL-MCNC: 58 MG/DL (ref 6–23)
BUN SERPL-MCNC: 58 MG/DL (ref 6–23)
CA-I BLD-SCNC: 1.1 MMOL/L (ref 1.1–1.33)
CA-I BLDA-SCNC: 1.11 MMOL/L (ref 1.1–1.33)
CA-I BLDA-SCNC: 1.15 MMOL/L (ref 1.1–1.33)
CALCIUM SERPL-MCNC: 8.4 MG/DL (ref 8.6–10.6)
CALCIUM SERPL-MCNC: 8.4 MG/DL (ref 8.6–10.6)
CHLORIDE BLDA-SCNC: 98 MMOL/L (ref 98–107)
CHLORIDE BLDA-SCNC: 99 MMOL/L (ref 98–107)
CHLORIDE SERPL-SCNC: 97 MMOL/L (ref 98–107)
CHLORIDE SERPL-SCNC: 97 MMOL/L (ref 98–107)
CO2 SERPL-SCNC: 23 MMOL/L (ref 21–32)
CO2 SERPL-SCNC: 24 MMOL/L (ref 21–32)
CREAT SERPL-MCNC: 1.94 MG/DL (ref 0.5–1.3)
CREAT SERPL-MCNC: 1.96 MG/DL (ref 0.5–1.3)
EGFRCR SERPLBLD CKD-EPI 2021: 38 ML/MIN/1.73M*2
EGFRCR SERPLBLD CKD-EPI 2021: 38 ML/MIN/1.73M*2
ERYTHROCYTE [DISTWIDTH] IN BLOOD BY AUTOMATED COUNT: 19.9 % (ref 11.5–14.5)
ERYTHROCYTE [DISTWIDTH] IN BLOOD BY AUTOMATED COUNT: 20.1 % (ref 11.5–14.5)
GLUCOSE BLD MANUAL STRIP-MCNC: 184 MG/DL (ref 74–99)
GLUCOSE BLD MANUAL STRIP-MCNC: 187 MG/DL (ref 74–99)
GLUCOSE BLD MANUAL STRIP-MCNC: 208 MG/DL (ref 74–99)
GLUCOSE BLD MANUAL STRIP-MCNC: 236 MG/DL (ref 74–99)
GLUCOSE BLD MANUAL STRIP-MCNC: 278 MG/DL (ref 74–99)
GLUCOSE BLDA-MCNC: 180 MG/DL (ref 74–99)
GLUCOSE BLDA-MCNC: 204 MG/DL (ref 74–99)
GLUCOSE SERPL-MCNC: 216 MG/DL (ref 74–99)
GLUCOSE SERPL-MCNC: 217 MG/DL (ref 74–99)
HCO3 BLDA-SCNC: 21.3 MMOL/L (ref 22–26)
HCO3 BLDA-SCNC: 23.2 MMOL/L (ref 22–26)
HCT VFR BLD AUTO: 23.6 % (ref 41–52)
HCT VFR BLD AUTO: 24.3 % (ref 41–52)
HCT VFR BLD EST: 29 % (ref 41–52)
HCT VFR BLD EST: 30 % (ref 41–52)
HGB BLD-MCNC: 8.5 G/DL (ref 13.5–17.5)
HGB BLD-MCNC: 9 G/DL (ref 13.5–17.5)
HGB BLDA-MCNC: 9.8 G/DL (ref 13.5–17.5)
HGB BLDA-MCNC: 9.9 G/DL (ref 13.5–17.5)
INHALED O2 CONCENTRATION: 40 %
INHALED O2 CONCENTRATION: 40 %
LACTATE BLDA-SCNC: 1.6 MMOL/L (ref 0.4–2)
LACTATE BLDA-SCNC: 2.2 MMOL/L (ref 0.4–2)
MAGNESIUM SERPL-MCNC: 2.19 MG/DL (ref 1.6–2.4)
MCH RBC QN AUTO: 28 PG (ref 26–34)
MCH RBC QN AUTO: 28.7 PG (ref 26–34)
MCHC RBC AUTO-ENTMCNC: 36 G/DL (ref 32–36)
MCHC RBC AUTO-ENTMCNC: 37 G/DL (ref 32–36)
MCV RBC AUTO: 77 FL (ref 80–100)
MCV RBC AUTO: 78 FL (ref 80–100)
NRBC BLD-RTO: 1 /100 WBCS (ref 0–0)
NRBC BLD-RTO: 1.3 /100 WBCS (ref 0–0)
OXYHGB MFR BLDA: 94 % (ref 94–98)
OXYHGB MFR BLDA: 95.4 % (ref 94–98)
PCO2 BLDA: 30 MM HG (ref 38–42)
PCO2 BLDA: 35 MM HG (ref 38–42)
PH BLDA: 7.43 PH (ref 7.38–7.42)
PH BLDA: 7.46 PH (ref 7.38–7.42)
PHOSPHATE SERPL-MCNC: 2.6 MG/DL (ref 2.5–4.9)
PHOSPHATE SERPL-MCNC: 2.8 MG/DL (ref 2.5–4.9)
PLATELET # BLD AUTO: 101 X10*3/UL (ref 150–450)
PLATELET # BLD AUTO: 88 X10*3/UL (ref 150–450)
PO2 BLDA: 81 MM HG (ref 85–95)
PO2 BLDA: 93 MM HG (ref 85–95)
POTASSIUM BLDA-SCNC: 3.8 MMOL/L (ref 3.5–5.3)
POTASSIUM BLDA-SCNC: 3.8 MMOL/L (ref 3.5–5.3)
POTASSIUM SERPL-SCNC: 3.8 MMOL/L (ref 3.5–5.3)
POTASSIUM SERPL-SCNC: 3.8 MMOL/L (ref 3.5–5.3)
PROT SERPL-MCNC: 5.4 G/DL (ref 6.4–8.2)
RBC # BLD AUTO: 3.04 X10*6/UL (ref 4.5–5.9)
RBC # BLD AUTO: 3.14 X10*6/UL (ref 4.5–5.9)
SAO2 % BLDA: 97 % (ref 94–100)
SAO2 % BLDA: 99 % (ref 94–100)
SODIUM BLDA-SCNC: 131 MMOL/L (ref 136–145)
SODIUM BLDA-SCNC: 132 MMOL/L (ref 136–145)
SODIUM SERPL-SCNC: 134 MMOL/L (ref 136–145)
SODIUM SERPL-SCNC: 134 MMOL/L (ref 136–145)
WBC # BLD AUTO: 7.5 X10*3/UL (ref 4.4–11.3)
WBC # BLD AUTO: 8.4 X10*3/UL (ref 4.4–11.3)

## 2024-04-14 PROCEDURE — 99291 CRITICAL CARE FIRST HOUR: CPT | Performed by: STUDENT IN AN ORGANIZED HEALTH CARE EDUCATION/TRAINING PROGRAM

## 2024-04-14 PROCEDURE — 37799 UNLISTED PX VASCULAR SURGERY: CPT | Performed by: STUDENT IN AN ORGANIZED HEALTH CARE EDUCATION/TRAINING PROGRAM

## 2024-04-14 PROCEDURE — 2500000004 HC RX 250 GENERAL PHARMACY W/ HCPCS (ALT 636 FOR OP/ED): Performed by: STUDENT IN AN ORGANIZED HEALTH CARE EDUCATION/TRAINING PROGRAM

## 2024-04-14 PROCEDURE — 83735 ASSAY OF MAGNESIUM: CPT | Performed by: STUDENT IN AN ORGANIZED HEALTH CARE EDUCATION/TRAINING PROGRAM

## 2024-04-14 PROCEDURE — 2500000004 HC RX 250 GENERAL PHARMACY W/ HCPCS (ALT 636 FOR OP/ED): Mod: JZ | Performed by: STUDENT IN AN ORGANIZED HEALTH CARE EDUCATION/TRAINING PROGRAM

## 2024-04-14 PROCEDURE — 84132 ASSAY OF SERUM POTASSIUM: CPT | Performed by: STUDENT IN AN ORGANIZED HEALTH CARE EDUCATION/TRAINING PROGRAM

## 2024-04-14 PROCEDURE — 82947 ASSAY GLUCOSE BLOOD QUANT: CPT

## 2024-04-14 PROCEDURE — 99233 SBSQ HOSP IP/OBS HIGH 50: CPT | Performed by: INTERNAL MEDICINE

## 2024-04-14 PROCEDURE — 84100 ASSAY OF PHOSPHORUS: CPT | Performed by: STUDENT IN AN ORGANIZED HEALTH CARE EDUCATION/TRAINING PROGRAM

## 2024-04-14 PROCEDURE — P9047 ALBUMIN (HUMAN), 25%, 50ML: HCPCS | Mod: JZ | Performed by: NURSE PRACTITIONER

## 2024-04-14 PROCEDURE — 94003 VENT MGMT INPAT SUBQ DAY: CPT

## 2024-04-14 PROCEDURE — 2500000005 HC RX 250 GENERAL PHARMACY W/O HCPCS: Performed by: STUDENT IN AN ORGANIZED HEALTH CARE EDUCATION/TRAINING PROGRAM

## 2024-04-14 PROCEDURE — 71045 X-RAY EXAM CHEST 1 VIEW: CPT | Performed by: RADIOLOGY

## 2024-04-14 PROCEDURE — 71045 X-RAY EXAM CHEST 1 VIEW: CPT

## 2024-04-14 PROCEDURE — 2500000001 HC RX 250 WO HCPCS SELF ADMINISTERED DRUGS (ALT 637 FOR MEDICARE OP): Performed by: STUDENT IN AN ORGANIZED HEALTH CARE EDUCATION/TRAINING PROGRAM

## 2024-04-14 PROCEDURE — 82248 BILIRUBIN DIRECT: CPT | Performed by: STUDENT IN AN ORGANIZED HEALTH CARE EDUCATION/TRAINING PROGRAM

## 2024-04-14 PROCEDURE — 2500000004 HC RX 250 GENERAL PHARMACY W/ HCPCS (ALT 636 FOR OP/ED): Mod: JZ | Performed by: NURSE PRACTITIONER

## 2024-04-14 PROCEDURE — 85027 COMPLETE CBC AUTOMATED: CPT | Performed by: STUDENT IN AN ORGANIZED HEALTH CARE EDUCATION/TRAINING PROGRAM

## 2024-04-14 PROCEDURE — P9047 ALBUMIN (HUMAN), 25%, 50ML: HCPCS | Mod: JZ | Performed by: STUDENT IN AN ORGANIZED HEALTH CARE EDUCATION/TRAINING PROGRAM

## 2024-04-14 PROCEDURE — C9113 INJ PANTOPRAZOLE SODIUM, VIA: HCPCS | Performed by: STUDENT IN AN ORGANIZED HEALTH CARE EDUCATION/TRAINING PROGRAM

## 2024-04-14 PROCEDURE — 82330 ASSAY OF CALCIUM: CPT | Performed by: STUDENT IN AN ORGANIZED HEALTH CARE EDUCATION/TRAINING PROGRAM

## 2024-04-14 PROCEDURE — 2500000001 HC RX 250 WO HCPCS SELF ADMINISTERED DRUGS (ALT 637 FOR MEDICARE OP): Performed by: NURSE PRACTITIONER

## 2024-04-14 PROCEDURE — 2020000001 HC ICU ROOM DAILY

## 2024-04-14 PROCEDURE — 99232 SBSQ HOSP IP/OBS MODERATE 35: CPT

## 2024-04-14 RX ORDER — LIDOCAINE HYDROCHLORIDE 10 MG/ML
INJECTION INFILTRATION; PERINEURAL
Status: DISPENSED
Start: 2024-04-14 | End: 2024-04-15

## 2024-04-14 RX ORDER — INSULIN GLARGINE 100 [IU]/ML
20 INJECTION, SOLUTION SUBCUTANEOUS EVERY 12 HOURS
Status: DISCONTINUED | OUTPATIENT
Start: 2024-04-14 | End: 2024-04-16

## 2024-04-14 RX ORDER — SIMETHICONE 80 MG
40 TABLET,CHEWABLE ORAL 4 TIMES DAILY
Status: DISPENSED | OUTPATIENT
Start: 2024-04-14 | End: 2024-04-15

## 2024-04-14 RX ORDER — ALBUMIN HUMAN 250 G/1000ML
25 SOLUTION INTRAVENOUS EVERY 8 HOURS
Status: DISCONTINUED | OUTPATIENT
Start: 2024-04-14 | End: 2024-04-17

## 2024-04-14 RX ORDER — POTASSIUM CHLORIDE 14.9 MG/ML
20 INJECTION INTRAVENOUS ONCE
Status: COMPLETED | OUTPATIENT
Start: 2024-04-14 | End: 2024-04-14

## 2024-04-14 RX ORDER — LIDOCAINE HCL/PF 100 MG/5ML
SYRINGE (ML) INTRAVENOUS
Status: DISPENSED
Start: 2024-04-14 | End: 2024-04-15

## 2024-04-14 RX ADMIN — INSULIN LISPRO 8 UNITS: 100 INJECTION, SOLUTION INTRAVENOUS; SUBCUTANEOUS at 20:00

## 2024-04-14 RX ADMIN — OXYCODONE HYDROCHLORIDE 5 MG: 5 TABLET ORAL at 05:45

## 2024-04-14 RX ADMIN — INSULIN LISPRO 4 UNITS: 100 INJECTION, SOLUTION INTRAVENOUS; SUBCUTANEOUS at 11:49

## 2024-04-14 RX ADMIN — FLUDROCORTISONE ACETATE 0.1 MG: 0.1 TABLET ORAL at 00:49

## 2024-04-14 RX ADMIN — INSULIN GLARGINE 16 UNITS: 100 INJECTION, SOLUTION SUBCUTANEOUS at 08:00

## 2024-04-14 RX ADMIN — INSULIN LISPRO 8 UNITS: 100 INJECTION, SOLUTION INTRAVENOUS; SUBCUTANEOUS at 07:54

## 2024-04-14 RX ADMIN — FLUDROCORTISONE ACETATE 0.1 MG: 0.1 TABLET ORAL at 12:00

## 2024-04-14 RX ADMIN — Medication: at 08:00

## 2024-04-14 RX ADMIN — MIDODRINE HYDROCHLORIDE 20 MG: 10 TABLET ORAL at 02:19

## 2024-04-14 RX ADMIN — MIDODRINE HYDROCHLORIDE 20 MG: 10 TABLET ORAL at 17:36

## 2024-04-14 RX ADMIN — ALBUMIN HUMAN 25 G: 0.25 SOLUTION INTRAVENOUS at 05:44

## 2024-04-14 RX ADMIN — INSULIN LISPRO 12 UNITS: 100 INJECTION, SOLUTION INTRAVENOUS; SUBCUTANEOUS at 04:39

## 2024-04-14 RX ADMIN — POTASSIUM CHLORIDE 20 MEQ: 14.9 INJECTION, SOLUTION INTRAVENOUS at 05:45

## 2024-04-14 RX ADMIN — HEPARIN SODIUM 5000 UNITS: 5000 INJECTION INTRAVENOUS; SUBCUTANEOUS at 13:30

## 2024-04-14 RX ADMIN — OXYCODONE HYDROCHLORIDE 5 MG: 5 TABLET ORAL at 10:31

## 2024-04-14 RX ADMIN — HYDROCORTISONE SODIUM SUCCINATE 50 MG: 100 INJECTION, POWDER, FOR SOLUTION INTRAMUSCULAR; INTRAVENOUS at 04:28

## 2024-04-14 RX ADMIN — SIMETHICONE 40 MG: 80 TABLET, CHEWABLE ORAL at 16:29

## 2024-04-14 RX ADMIN — HEPARIN SODIUM 5000 UNITS: 5000 INJECTION INTRAVENOUS; SUBCUTANEOUS at 21:25

## 2024-04-14 RX ADMIN — CALCIUM GLUCONATE 1 G: 20 INJECTION, SOLUTION INTRAVENOUS at 04:28

## 2024-04-14 RX ADMIN — ALBUMIN HUMAN 25 G: 0.25 SOLUTION INTRAVENOUS at 00:54

## 2024-04-14 RX ADMIN — OXYCODONE HYDROCHLORIDE 5 MG: 5 TABLET ORAL at 23:25

## 2024-04-14 RX ADMIN — HEPARIN SODIUM 5000 UNITS: 5000 INJECTION INTRAVENOUS; SUBCUTANEOUS at 05:45

## 2024-04-14 RX ADMIN — SIMETHICONE 40 MG: 80 TABLET, CHEWABLE ORAL at 07:04

## 2024-04-14 RX ADMIN — SIMETHICONE 40 MG: 80 TABLET, CHEWABLE ORAL at 00:49

## 2024-04-14 RX ADMIN — OXYCODONE HYDROCHLORIDE 5 MG: 5 TABLET ORAL at 16:30

## 2024-04-14 RX ADMIN — MEROPENEM 1 G: 1 INJECTION, POWDER, FOR SOLUTION INTRAVENOUS at 13:30

## 2024-04-14 RX ADMIN — HYDROCORTISONE SODIUM SUCCINATE 50 MG: 100 INJECTION, POWDER, FOR SOLUTION INTRAMUSCULAR; INTRAVENOUS at 10:19

## 2024-04-14 RX ADMIN — MIDODRINE HYDROCHLORIDE 20 MG: 10 TABLET ORAL at 10:19

## 2024-04-14 RX ADMIN — SIMETHICONE 40 MG: 80 TABLET, CHEWABLE ORAL at 21:24

## 2024-04-14 RX ADMIN — PANTOPRAZOLE SODIUM 40 MG: 40 INJECTION, POWDER, FOR SOLUTION INTRAVENOUS at 08:00

## 2024-04-14 RX ADMIN — ALBUMIN HUMAN 25 G: 0.25 SOLUTION INTRAVENOUS at 21:25

## 2024-04-14 RX ADMIN — INSULIN GLARGINE 20 UNITS: 100 INJECTION, SOLUTION SUBCUTANEOUS at 21:24

## 2024-04-14 RX ADMIN — INSULIN LISPRO 4 UNITS: 100 INJECTION, SOLUTION INTRAVENOUS; SUBCUTANEOUS at 16:22

## 2024-04-14 RX ADMIN — HYDROCORTISONE SODIUM SUCCINATE 50 MG: 100 INJECTION, POWDER, FOR SOLUTION INTRAMUSCULAR; INTRAVENOUS at 17:45

## 2024-04-14 RX ADMIN — MEROPENEM 1 G: 1 INJECTION, POWDER, FOR SOLUTION INTRAVENOUS at 02:18

## 2024-04-14 RX ADMIN — ALBUMIN HUMAN 25 G: 0.25 SOLUTION INTRAVENOUS at 13:29

## 2024-04-14 RX ADMIN — HYDROMORPHONE HYDROCHLORIDE 0.4 MG: 1 INJECTION, SOLUTION INTRAMUSCULAR; INTRAVENOUS; SUBCUTANEOUS at 17:36

## 2024-04-14 ASSESSMENT — PAIN - FUNCTIONAL ASSESSMENT
PAIN_FUNCTIONAL_ASSESSMENT: 0-10

## 2024-04-14 ASSESSMENT — PAIN SCALES - GENERAL
PAINLEVEL_OUTOF10: 7
PAINLEVEL_OUTOF10: 9
PAINLEVEL_OUTOF10: 0 - NO PAIN

## 2024-04-14 ASSESSMENT — PAIN DESCRIPTION - LOCATION: LOCATION: ABDOMEN

## 2024-04-14 NOTE — PROGRESS NOTES
"Jason Hollins is a 63 y.o. male on day 40 of admission presenting with Soft tissue sarcoma (Multi).    Subjective   Yesterday 2L removed via SLED. No Levo needed overnight.    This morning, patient feels well overall denying significant pain, nausea, chest pain, shortness of breath. No pressors needs this morning.       Objective     Physical Exam  Constitutional:       General: He is not in acute distress.  Cardiovascular:      Rate and Rhythm: Normal rate and regular rhythm.   Pulmonary:      Comments: Diminished breath sounds and crackles auscultated bilaterally  Abdominal:      Comments: Mildly distended, compressible, no significant tenderness noted, normoactive bowel sounds   Musculoskeletal:      Comments: 2+ pitting edema noted in bilateral lower extremities   Skin:     General: Skin is warm.   Neurological:      Mental Status: He is alert. Mental status is at baseline.      Comments: Alert and responding to basic commands with squeezing and left lower extremity foot movement         Last Recorded Vitals  Blood pressure 145/69, pulse 88, temperature 36.2 °C (97.1 °F), temperature source Temporal, resp. rate (!) 27, height 1.778 m (5' 10\"), weight 119 kg (261 lb 14.5 oz), SpO2 100%.  Intake/Output last 3 Shifts:  I/O last 3 completed shifts:  In: 3023.6 (25.5 mL/kg) [I.V.:213.6 (1.8 mL/kg); Blood:300; NG/GT:1710; IV Piggyback:800]  Out: 4350 (36.6 mL/kg) [Drains:450; Other:3900]  Weight: 118.8 kg     Relevant Results  Scheduled medications  fludrocortisone, 0.1 mg, nasogastric tube, q12h  heparin (porcine), 5,000 Units, subcutaneous, q8h  hydrocortisone sodium succinate, 50 mg, intravenous, q6h  insulin glargine, 16 Units, subcutaneous, q12h  insulin lispro, 0-20 Units, subcutaneous, q4h  meropenem, 1 g, intravenous, q12h  midodrine, 20 mg, orogastric tube, q8h  oxyCODONE, 5 mg, oral, q6h  oxygen, , inhalation, Continuous - Inhalation  pantoprazole, 40 mg, intravenous, Daily      Continuous medications  lactated " Ringer's, 5 mL/hr, Last Rate: 5 mL/hr (04/14/24 1200)  norepinephrine, 0.01-0.5 mcg/kg/min (Dosing Weight), Last Rate: Stopped (04/13/24 1356)      PRN medications  PRN medications: alteplase, calcium gluconate, calcium gluconate, dextrose, glucagon, HYDROmorphone, HYDROmorphone, magnesium sulfate, magnesium sulfate, sodium chloride  Results for orders placed or performed during the hospital encounter of 03/05/24 (from the past 24 hour(s))   POCT GLUCOSE   Result Value Ref Range    POCT Glucose 219 (H) 74 - 99 mg/dL   Type and screen   Result Value Ref Range    ABO TYPE O     Rh TYPE POS     ANTIBODY SCREEN NEG    POCT GLUCOSE   Result Value Ref Range    POCT Glucose 283 (H) 74 - 99 mg/dL   POCT GLUCOSE   Result Value Ref Range    POCT Glucose 217 (H) 74 - 99 mg/dL   Blood Gas Arterial Full Panel   Result Value Ref Range    POCT pH, Arterial 7.43 (H) 7.38 - 7.42 pH    POCT pCO2, Arterial 35 (L) 38 - 42 mm Hg    POCT pO2, Arterial 93 85 - 95 mm Hg    POCT SO2, Arterial 99 94 - 100 %    POCT Oxy Hemoglobin, Arterial 95.4 94.0 - 98.0 %    POCT Hematocrit Calculated, Arterial 30.0 (L) 41.0 - 52.0 %    POCT Sodium, Arterial 131 (L) 136 - 145 mmol/L    POCT Potassium, Arterial 3.8 3.5 - 5.3 mmol/L    POCT Chloride, Arterial 98 98 - 107 mmol/L    POCT Ionized Calcium, Arterial 1.15 1.10 - 1.33 mmol/L    POCT Glucose, Arterial 204 (H) 74 - 99 mg/dL    POCT Lactate, Arterial 2.2 (H) 0.4 - 2.0 mmol/L    POCT Base Excess, Arterial -0.8 -2.0 - 3.0 mmol/L    POCT HCO3 Calculated, Arterial 23.2 22.0 - 26.0 mmol/L    POCT Hemoglobin, Arterial 9.9 (L) 13.5 - 17.5 g/dL    POCT Anion Gap, Arterial 14 10 - 25 mmo/L    Patient Temperature 37.0 degrees Celsius    FiO2 40 %   CALCIUM, IONIZED   Result Value Ref Range    POCT Calcium, Ionized 1.10 1.1 - 1.33 mmol/L   CBC   Result Value Ref Range    WBC 8.4 4.4 - 11.3 x10*3/uL    nRBC 1.0 (H) 0.0 - 0.0 /100 WBCs    RBC 3.04 (L) 4.50 - 5.90 x10*6/uL    Hemoglobin 8.5 (L) 13.5 - 17.5 g/dL     Hematocrit 23.6 (L) 41.0 - 52.0 %    MCV 78 (L) 80 - 100 fL    MCH 28.0 26.0 - 34.0 pg    MCHC 36.0 32.0 - 36.0 g/dL    RDW 20.1 (H) 11.5 - 14.5 %    Platelets 88 (L) 150 - 450 x10*3/uL   Hepatic function panel   Result Value Ref Range    Albumin 3.2 (L) 3.4 - 5.0 g/dL    Bilirubin, Total 26.8 (H) 0.0 - 1.2 mg/dL    Bilirubin, Direct 18.6 (H) 0.0 - 0.3 mg/dL    Alkaline Phosphatase 177 (H) 33 - 136 U/L    ALT 5 (L) 10 - 52 U/L    AST 62 (H) 9 - 39 U/L    Total Protein 5.4 (L) 6.4 - 8.2 g/dL   Magnesium   Result Value Ref Range    Magnesium 2.19 1.60 - 2.40 mg/dL   Basic Metabolic Panel   Result Value Ref Range    Glucose 217 (H) 74 - 99 mg/dL    Sodium 134 (L) 136 - 145 mmol/L    Potassium 3.8 3.5 - 5.3 mmol/L    Chloride 97 (L) 98 - 107 mmol/L    Bicarbonate 23 21 - 32 mmol/L    Anion Gap 18 10 - 20 mmol/L    Urea Nitrogen 58 (H) 6 - 23 mg/dL    Creatinine 1.96 (H) 0.50 - 1.30 mg/dL    eGFR 38 (L) >60 mL/min/1.73m*2    Calcium 8.4 (L) 8.6 - 10.6 mg/dL   Phosphorus   Result Value Ref Range    Phosphorus 2.8 2.5 - 4.9 mg/dL   Renal Function Panel   Result Value Ref Range    Glucose 216 (H) 74 - 99 mg/dL    Sodium 134 (L) 136 - 145 mmol/L    Potassium 3.8 3.5 - 5.3 mmol/L    Chloride 97 (L) 98 - 107 mmol/L    Bicarbonate 24 21 - 32 mmol/L    Anion Gap 17 10 - 20 mmol/L    Urea Nitrogen 58 (H) 6 - 23 mg/dL    Creatinine 1.94 (H) 0.50 - 1.30 mg/dL    eGFR 38 (L) >60 mL/min/1.73m*2    Calcium 8.4 (L) 8.6 - 10.6 mg/dL    Phosphorus 2.6 2.5 - 4.9 mg/dL    Albumin 3.2 (L) 3.4 - 5.0 g/dL   POCT GLUCOSE   Result Value Ref Range    POCT Glucose 278 (H) 74 - 99 mg/dL   POCT GLUCOSE   Result Value Ref Range    POCT Glucose 236 (H) 74 - 99 mg/dL   POCT GLUCOSE   Result Value Ref Range    POCT Glucose 184 (H) 74 - 99 mg/dL           Assessment/Plan   Principal Problem:    Soft tissue sarcoma (Multi)  Active Problems:    Acute kidney injury (nontraumatic) (CMS-HCC)    Pulmonary embolus (Multi)    Anemia    Thrombocytopenia  (CMS-HCC)    Current chronic use of systemic steroids    Gastroesophageal reflux disease without esophagitis    Extraskeletal myxoid chondrosarcoma (Multi)    Cardiopulmonary arrest (Multi)    Acute respiratory failure with hypoxia (Multi)    Tracheostomy hemorrhage (Multi)    Postoperative wound breakdown, initial encounter    Postoperative wound breakdown, sequela    Jason Hollins is a 63 y.o. 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 Hawkins and Dr. Smith on 3/5/24.  Post operative course was uncomplicated and patient was on RNF until 3/20 for prolonged bed rest to avoid hip flexion to maintain flap integrity.  Patient was on prophylactic lovenox while on bedrest.      3/20: Cardiopulmonary arrest s/p CPR with ROSC after TPA, overnight started on heparin, epo, increased pressor requirements, increased swelling at flap site.      3/21: Hemorrhagic shock, MTP. Firm and large right flap site. Return to OR s/p Exploration of right thigh wound, Evacuation of hematoma. Suture ligation of multiple bleeding vessel and several points in gluteal area.      4/1: s/p Trach     4/4: return OR with ENT s/p laryngoscopy, esophagoscopy and bronchoscopy, trach revision. Found extensive clot burden in esophagus and stomach as well as in the lungs. Oozing at thyroid bed cauterized. Trach exchanged to new 6.0 prox XLT elvie. OR findings:  blood up glottis and from thyroid bed to airway.     4/9: Patient is medically stable for OR on 4/11/24.  He is appropriately n.p.o. since midnight and has had more DVT prophylaxis held for OR today.     Plan:  NEURO: History of myxoid chondrosarcoma s/p wide resection sarcoma, sciatic nerve neurolysis of right lower extremity with right gluteal reconstruction, pedicle ALT, vastus lateralus flap,  pedicle gluteal and perisformis flap with Dr. Hawkins and Dr. Smith on 3/5/24 s/p cardiopulmonary arrest with ROSC 3/20. CT head 3/22 without acute process. Weaned off cisatracurium and propofol overnight 3/23-3/24. Ketamine weaned off 3/25 and Fentanyl weaned off 3/26 am. Repeat CT Head 3/26 without acute process. MRI Brain 3/30, negative for cerebral infarction or hemorrhage. Neuro exam - off sedation, following commands except for RLE, tracking, nodding/shaking head to questions  - Post op pain - Oxycodone 5mg q6h + Dilaudid PRN (not used PRNs overnight)  - ongoing neuro and pain assessments  - keep off sedation  - PT/OT -> AROM     CV: History of HTN, HLD. Acute cardiopulmonary arrest 2/2 to possible massive PE vs massive STEMI, received multiple rounds of CPR and one defibrillation.  Sustained ROSC achieved after TPA bolus. On high dose levophed, epinephrine, vaso, angioT2. Plan on 3/21 was for ECMO which was aborted secondary to large hemhorrge at right flap site. Had MTP and taken OR with 5L evacuated. ECHO 3/21: Normal LV, Mod enlarged RV, slight suggestion of a Townsend's sign / RV strain, low normal RV function. ECHO 3/22 with hyperdynamic LV. New onset Afib with RVR overnight 3/22-3/23, dosed with 150mg amio x2, started infusion at 1mg/min thereafter. AT2 weaned off. Amio infusion discontinued 3/25. Lactate downtrending. Vaso and epi weaned off. Remains in NSR. iEpo weaned off 3/27. Repeat TTE 3/29 and 4/2 essentially unchanged. Levo resumed 4/2 evening to continue aggressive fluid removal. Repeat TTE on 4/10 with LVEF 65-70% and RV difficulty to assess.  - continuous EKG/abp/cvp monitoring  - Goal map range 65-90  -midodrine to 20 mg 3 times daily  - Continue florinef 0.1mg BID   -Continue stress dose steroids to 50 mg q 8h - will taper prior to conversion to PO Prednisone  - Ordered concentrated albumin 25g TID for 24hr  - Off Levo since yesterday 4/13  - Holding heparin infusion for now      PULM:  Arrived to SICU intubated julio c-CPR. Concern for massive PE. Started on iEpo, currently on 0.05. Hypoxic respiratory failure. Severe ARDS. ETT exchanged 3/23. CT Chest 3/26 with interval JOHN consolidative/ground glass opacity. S/p Tracheostomy on 4/1. Bedside bronch -> some blood in trach, posterior wall tissue just distal to trach tip appears to be collapsing on cannula.  - ABG indicated 7.42, 35, 93 on AC/VC - will try SIMV today (will discuss with RT)  - ABGs prn  - additional pulm toilet prn -will discuss regimen with RT  - daily CXR -chest x-ray today indicates similar opacities most prominently in R mid-lower lung fields. Noticeable R sided pleural effusion - similar to prior day     ENT: Had epistaxis 3/23, completed afrin bid x3 days. S/p trach on 4/1. Bleeding from trach site 4/2 am, packing placed by ENT. Repeat episode of bloody tracheal secretions 4/3 through this am. Taken back to OR with ENT 4/4 as per above.  -No bleeding from trach site noted     GI: NPO. Shock liver, pancreatitis. Elevated TG. Elevated lactate. Consulted ACS for potential bowel ischemia as cause of persistent lactic acidosis. CT imaging 3/22 with evidence of pancreatitis. No acute surgical indication per ACS. RUQ US 3/22 with thickening of GB wall without cholelithiasis. CT A/P 3/26 negative for acute bleed. LFTs downtrending. Worsening hyperbilirubinemia. Amylase and lipase now WNL. Repeat RUQ US 4/1 with nonspecific gallbladder wall edema and thickening which may be 2/2 generalized fluid overload, mild hepatomegaly with slight nodular contour and c/f steatosis and fibrosis, irregular hypoechoic region adjacent to hepatic veins. US liver with doppler 4/2 revealed hepatomegaly with nodular contour, mildly elevated RI in main and left hepatic arteries. Hyperbilirubinemia  - Having BM daily  - Ordered Simethicone given gaseous distention on KUB  - prostat daily  - PPI daily for GI prophy  - Trend LFTs daily -AST/ALT/total bilirubin is  approximately similar to prior days.  Per discussion with hepatology yesterday, minor fluctuations are to be expected prior to eventual downtrend     : No history of renal disease. Baseline creatinine 0.6. Anuric RUTH. Elevated CK, downtrending. Metabolic acidosis, total body fluid overload, hyperkalemia. Started on CVVH 3/21, stopped bicarb infusion 3/22. Marino removed 3/24. Hyponatremia resolved. Stopped CVVH 4/2.  Has been tolerating SLED per nephrology recs  - Nephrology following - completed SLED yesterday. Considering iHD tomorrow  - RFP BID and PRN  - Replete electrolytes judiciously - RFP to be drawn 4 hours after SLED for repletion of electrolytes  - Bladder scan pending this AM      HEME: Patient was on ppx lovenox for DVT prophylaxis prior to cardiopulmonary arrest. Concern for massive PE patient received bolus of TPA during CPR. Started on heparin infusion overnight given concern for PE. Hemorrhagic shock 3/21, MTP and back to OR s/p Exploration of right thigh woundEvacuation of hematoma. Suture ligation of multiple bleeding vessel and several points in gluteal area. Hematology consulted 3/22 to r/o HLH. Transfused 1 RBC overnight 3/22-3/23. Thrombocytopenia, coagulapathy, hypofibrinogenemia. LE Duplex 3/25 +acute occlusive R soleal DVT. Received 2u PRBC and 1u FFP on 3/26. Heparin infusion discontinued 3/26 to r/o HIT and transitioned to bival. PF4 negative, resumed heparin infuision 3/27. Elevated IL2R and decreased NK function. C/f HLH (low suspicion), empirically treated with decadron (3/28-3/31). Thrombocytopenia on 3/30 to 18k, received 5pk, did not increment. Heparin held. Thrombocytopenia likely 2/2 to consumptive process, hematology following. Received IVIG and 5pk plts 3/31. Pancytopenia persists, improving thrombocytopenia  - cbc, coags bid and prn  -Hemoglobin stable at 8.5 (prior hemoglobin 10)  - SubQ Heparin restarted on 4/11 post op  - Hematology following, appreciate recs -> maintain  Plt count >35k  - ongoing monitoring for s/s bleeding  - close surveillance of RLE and drains  - Vasc Med signed off 3/27  - maintain active T&S (4/13)     ENDO: History of DM2. A1c 7.5%. TSH WNL 1/2024. Hyperglycemia  - Increased to Lantus 20u BID given hyperglycemia in past 24 hours  - q4h accuchecks and SSI #4     ID: Leukocytosis resolved, now with leukopenia. On broad antimicrobial therapy. MRSA screen + 2/28/24. Pan cultures sent 3/22. Sputum NTF, +MRSA screen (completed 5 day course mupirocin), UCx and BCx negative. Completed 7 day course vanc/zosyn 3/27. Febrile to 39.1 4/3, resent blood cultures and BAL and started vanco and zosyn. Switched zosyn to reynaldo, kept on vanco, added john. Blood cultures and sputum with Klebsiella. Repeat blood cultures sent 4/4.  - F/U cultures - Klebsiella grown in wound culture - susceptible to Meropenem per ID  - temp q4h, wbc daily  - Per ID - discussed with pharmacy who recommended 1g BID dosing  - Per ID - continue Meropenem - will determine end date later based on clinical course  - Consider line holiday. Can place additional PIV prior to potential removal of central line - will rediscuss with team based on trajectory today  - Ucx shows no growth  - ongoing monitoring for s/s infection  - plastics team monitoring wound site - no increased drainage noted in drains and swelling consistent with prior day. Plastics team is monitoring as well  - If continues to be hypotensive consider broaden abx/ add antifungal     Lines:  - right radial a line (4/5)  - RIJ trialysis (3/27)  - PIV x2    Patient discussed with Dr. Moreno.    Mariluz Dailey, PGY-1  SICU Team

## 2024-04-14 NOTE — CARE PLAN
The patient's goals for the shift include  HANNAH    The clinical goals for the shift include Pt will be HD stable with adequate oxygenation and perfusion, will rest comfortably during the night    Over the shift, the patient did make progress toward the following goals.

## 2024-04-14 NOTE — PROGRESS NOTES
"    Department of Plastic and Reconstructive Surgery  Daily Progress Note    Patient Name: Jason Hollins  MRN: [id number]  Date:  04/14/24     Subjective  Found resting in bed comfortably in TSICU, with son at bedside reading to him. Remains critically ill, trach to vent, but in good spirits, smiling and giving thumbs up. Following commands and shaking head appropriately.    Overnight Events  NAOE    Objective    Vital Signs  /77 (BP Location: Right arm, Patient Position: Lying)   Pulse 93   Temp 36.2 °C (97.1 °F) (Temporal)   Resp (!) 0   Ht 1.778 m (5' 10\")   Wt 119 kg (261 lb 14.5 oz)   SpO2 100%   BMI 37.58 kg/m²      Physical Exam   Constitutional: Alert, lying in pressure relieving bed in ICU, appears critically ill.   ENMT: MMM, dobhoff in R nare.  Cardiovascular: RRR on telemetry monitor.  Respiratory/Thorax: Trach to vent.  Gastrointestinal: Abdomen soft, protuberant.   Musculoskeletal: Able to squeeze bilateral hands, wiggles toes on the left, minimal movement on RLE, effort to move foot observed.   Extremities: Generalized pitting edema. Right thigh edematous, but soft and compressible, no bruising or tissue induration noted. Visible portion of flap recipient site and adjacent anterior R thigh incisions appear stable. Posterior flap incision line and posterior thigh/gluteal region incision dressed with incisional wound vac, maintaining low continuous suction at -75mmHg, no alarms for leak, obstruction or malfunction, expectantly no output in collecting canister. ANDERSON drain x 3 right upper leg with dark serous outputs.  Neurological: Off sedation, alert and able to respond to commands with head nod.   Skin: Edematous, juandice, warm.   Diagnostics   Results for orders placed or performed during the hospital encounter of 03/05/24 (from the past 24 hour(s))   POCT GLUCOSE   Result Value Ref Range    POCT Glucose 219 (H) 74 - 99 mg/dL   Type and screen   Result Value Ref Range    ABO TYPE O     Rh TYPE " POS     ANTIBODY SCREEN NEG    POCT GLUCOSE   Result Value Ref Range    POCT Glucose 283 (H) 74 - 99 mg/dL   POCT GLUCOSE   Result Value Ref Range    POCT Glucose 217 (H) 74 - 99 mg/dL   Blood Gas Arterial Full Panel   Result Value Ref Range    POCT pH, Arterial 7.43 (H) 7.38 - 7.42 pH    POCT pCO2, Arterial 35 (L) 38 - 42 mm Hg    POCT pO2, Arterial 93 85 - 95 mm Hg    POCT SO2, Arterial 99 94 - 100 %    POCT Oxy Hemoglobin, Arterial 95.4 94.0 - 98.0 %    POCT Hematocrit Calculated, Arterial 30.0 (L) 41.0 - 52.0 %    POCT Sodium, Arterial 131 (L) 136 - 145 mmol/L    POCT Potassium, Arterial 3.8 3.5 - 5.3 mmol/L    POCT Chloride, Arterial 98 98 - 107 mmol/L    POCT Ionized Calcium, Arterial 1.15 1.10 - 1.33 mmol/L    POCT Glucose, Arterial 204 (H) 74 - 99 mg/dL    POCT Lactate, Arterial 2.2 (H) 0.4 - 2.0 mmol/L    POCT Base Excess, Arterial -0.8 -2.0 - 3.0 mmol/L    POCT HCO3 Calculated, Arterial 23.2 22.0 - 26.0 mmol/L    POCT Hemoglobin, Arterial 9.9 (L) 13.5 - 17.5 g/dL    POCT Anion Gap, Arterial 14 10 - 25 mmo/L    Patient Temperature 37.0 degrees Celsius    FiO2 40 %   CALCIUM, IONIZED   Result Value Ref Range    POCT Calcium, Ionized 1.10 1.1 - 1.33 mmol/L   CBC   Result Value Ref Range    WBC 8.4 4.4 - 11.3 x10*3/uL    nRBC 1.0 (H) 0.0 - 0.0 /100 WBCs    RBC 3.04 (L) 4.50 - 5.90 x10*6/uL    Hemoglobin 8.5 (L) 13.5 - 17.5 g/dL    Hematocrit 23.6 (L) 41.0 - 52.0 %    MCV 78 (L) 80 - 100 fL    MCH 28.0 26.0 - 34.0 pg    MCHC 36.0 32.0 - 36.0 g/dL    RDW 20.1 (H) 11.5 - 14.5 %    Platelets 88 (L) 150 - 450 x10*3/uL   Hepatic function panel   Result Value Ref Range    Albumin 3.2 (L) 3.4 - 5.0 g/dL    Bilirubin, Total 26.8 (H) 0.0 - 1.2 mg/dL    Bilirubin, Direct 18.6 (H) 0.0 - 0.3 mg/dL    Alkaline Phosphatase 177 (H) 33 - 136 U/L    ALT 5 (L) 10 - 52 U/L    AST 62 (H) 9 - 39 U/L    Total Protein 5.4 (L) 6.4 - 8.2 g/dL   Magnesium   Result Value Ref Range    Magnesium 2.19 1.60 - 2.40 mg/dL   Basic Metabolic  Panel   Result Value Ref Range    Glucose 217 (H) 74 - 99 mg/dL    Sodium 134 (L) 136 - 145 mmol/L    Potassium 3.8 3.5 - 5.3 mmol/L    Chloride 97 (L) 98 - 107 mmol/L    Bicarbonate 23 21 - 32 mmol/L    Anion Gap 18 10 - 20 mmol/L    Urea Nitrogen 58 (H) 6 - 23 mg/dL    Creatinine 1.96 (H) 0.50 - 1.30 mg/dL    eGFR 38 (L) >60 mL/min/1.73m*2    Calcium 8.4 (L) 8.6 - 10.6 mg/dL   Phosphorus   Result Value Ref Range    Phosphorus 2.8 2.5 - 4.9 mg/dL   Renal Function Panel   Result Value Ref Range    Glucose 216 (H) 74 - 99 mg/dL    Sodium 134 (L) 136 - 145 mmol/L    Potassium 3.8 3.5 - 5.3 mmol/L    Chloride 97 (L) 98 - 107 mmol/L    Bicarbonate 24 21 - 32 mmol/L    Anion Gap 17 10 - 20 mmol/L    Urea Nitrogen 58 (H) 6 - 23 mg/dL    Creatinine 1.94 (H) 0.50 - 1.30 mg/dL    eGFR 38 (L) >60 mL/min/1.73m*2    Calcium 8.4 (L) 8.6 - 10.6 mg/dL    Phosphorus 2.6 2.5 - 4.9 mg/dL    Albumin 3.2 (L) 3.4 - 5.0 g/dL   POCT GLUCOSE   Result Value Ref Range    POCT Glucose 278 (H) 74 - 99 mg/dL   POCT GLUCOSE   Result Value Ref Range    POCT Glucose 236 (H) 74 - 99 mg/dL   POCT GLUCOSE   Result Value Ref Range    POCT Glucose 184 (H) 74 - 99 mg/dL     Current Medications  Scheduled medications  albumin human, 25 g, intravenous, q8h  fludrocortisone, 0.1 mg, nasogastric tube, q12h  heparin (porcine), 5,000 Units, subcutaneous, q8h  hydrocortisone sodium succinate, 50 mg, intravenous, q8h  insulin glargine, 20 Units, subcutaneous, q12h  insulin lispro, 0-20 Units, subcutaneous, q4h  meropenem, 1 g, intravenous, q12h  midodrine, 20 mg, orogastric tube, q8h  oxyCODONE, 5 mg, oral, q6h  oxygen, , inhalation, Continuous - Inhalation  pantoprazole, 40 mg, intravenous, Daily      Continuous medications  lactated Ringer's, 5 mL/hr, Last Rate: 5 mL/hr (04/14/24 1200)  norepinephrine, 0.01-0.5 mcg/kg/min (Dosing Weight), Last Rate: Stopped (04/13/24 1356)      PRN medications  PRN medications: alteplase, calcium gluconate, calcium gluconate,  dextrose, glucagon, HYDROmorphone, HYDROmorphone, magnesium sulfate, magnesium sulfate, sodium chloride     Assessment  Jason Hollins 63 y.o. 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 Hawkins and Dr. Smith on 3/5/24. Postoperative course c/b arrest requiring CPR with ROSC after TPA for assumed large PE on 3/20. Increased swelling at flap site appreciated overnight 3/20-3/21 and pt returned to OR for exploration of R flap site, evacuation of hematoma, suture ligation of multiple bleeding vessel sites in gluteal area and wound closure with Dr. Smith on 3/21. Pt has remained in ICU for continued critical care evaluation and management postoperatively. Returned to OR 4/11 with plastic surgery for R posterior thigh/gluteal region I&D with tissue advancement/complex closure and application of incisional wound vac.     Plan/Recommendations  - No additional acute surgical interventions planned from plastic surgery perspective   - Maintain incisional wound vac to R posterior thigh wound site per plastic surgery x14 (4/25) days post-op         ·  Settings: -75 mmHg low continuous suction        ·  Monitor/record vac canister output [id number]       ·  Notify plastic surgery with any concerns of leak or obstruction  - Continue ANDERSON drain care to x3 drains present at R thigh flap reconstruction site      ·  Strip drain tubing TID and PRN     ·  Monitor and record output q8h      ·  Keep drain sites C/D/I      ·  Notify plastics if ANDERSON drain output exceeds > 100 ml/hr in one drain or > 300 ml/hr collectively  - Avoid pressure application or positioning onto flap site or incisions at R upper leg   - Recommend patient be positioned off of R side as able to prevent flap compression/wound breakdown   - Continue clinitron fluidized air  mattress  - Appreciate remaining supportive care per ICU   - Plastic Surgery will continue to follow      Patient and plan discussed with Dr. Smith.     PHILLIP Dalal-CNP  Plastic and Reconstructive Surgery   [place]  Pager #[phone]  Team phones: [phone]

## 2024-04-14 NOTE — PROGRESS NOTES
Jason Hollins is a 63 y.o. male on day 40 of admission presenting with Soft tissue sarcoma (Multi).      Subjective   4/14: S/P SLED yesterday during day with 2L fluid removed. Son at bedside reading pt a book. Pt is alert and follows commands. BP stable and off levo since yesterday. FiO2 40% to vent via trach. Remains anuric.    Objective          Vitals 24HR  Heart Rate:  []   Temp:  [35.8 °C (96.4 °F)-36.9 °C (98.4 °F)]   Resp:  [0-27]   BP: (108-165)/(55-77)   SpO2:  [97 %-100 %]         Intake/Output last 3 Shifts:    Intake/Output Summary (Last 24 hours) at 4/14/2024 1404  Last data filed at 4/14/2024 1200  Gross per 24 hour   Intake 1750 ml   Output 2230 ml   Net -480 ml       Physical Exam  General appearance: intubated, trached  Neck: trach  Heart: RRR  Lungs: FiO2 40% on vent via trach  : no Marino  Extremities: no leg edema  Neuro: alert, follows commands  Dialysis ACCESS:  right I J trialysis    Current Facility-Administered Medications:     albumin human 25 % solution 25 g, 25 g, intravenous, q8h, Mariluz Dailey MD, Last Rate: 100 mL/hr at 04/14/24 1329, 25 g at 04/14/24 1329    alteplase (Cathflo Activase) injection 2 mg, 2 mg, intra-catheter, PRN, Mariluz Dailey MD    calcium gluconate in NS IV 1 g, 1 g, intravenous, q6h PRN, Mariluz Dailey MD, Stopped at 04/14/24 0458    calcium gluconate in NS IV 2 g, 2 g, intravenous, q6h PRN, Mariluz Dailey MD, Last Rate: 100 mL/hr at 04/04/24 1118, 2 g at 04/04/24 1118    dextrose 50 % injection 12.5 g, 12.5 g, intravenous, q15 min PRN, Mariluz Dailey MD    fludrocortisone (Florinef) tablet 0.1 mg, 0.1 mg, nasogastric tube, q12h, Mariluz Dailey MD, 0.1 mg at 04/14/24 1200    glucagon (Glucagen) injection 1 mg, 1 mg, intramuscular, q15 min PRN, Mariluz Dailey MD    heparin (porcine) injection 5,000 Units, 5,000 Units, subcutaneous, q8h, Mariluz Dailey MD, 5,000 Units at 04/14/24 1330    hydrocortisone sod succ (PF) (Solu-CORTEF) injection 50 mg, 50 mg,  intravenous, q8h, Mariluz Dailey MD    HYDROmorphone (Dilaudid) injection 0.2 mg, 0.2 mg, intravenous, q3h PRN, Mariluz Dailey MD, 0.2 mg at 04/12/24 0857    HYDROmorphone (Dilaudid) injection 0.4 mg, 0.4 mg, intravenous, q4h PRN, Mariluz Dailey MD, 0.4 mg at 04/12/24 1039    insulin glargine (Lantus) injection 20 Units, 20 Units, subcutaneous, q12h, Mariluz Dailey MD    insulin lispro (HumaLOG) injection 0-20 Units, 0-20 Units, subcutaneous, q4h, Mariluz Dailey MD, 4 Units at 04/14/24 1149    lactated Ringer's infusion, 5 mL/hr, intravenous, Continuous, Mariluz Dailey MD, Last Rate: 5 mL/hr at 04/14/24 1200, 5 mL/hr at 04/14/24 1200    magnesium sulfate IV 2 g, 2 g, intravenous, q6h PRN, Mariluz Dailey MD, Stopped at 03/24/24 1726    magnesium sulfate IV 4 g, 4 g, intravenous, q6h PRN, Mariluz Dailey MD, Stopped at 04/05/24 0838    meropenem (Merrem) in sodium chloride 0.9 % 100 mL IV 1 g, 1 g, intravenous, q12h, Mariluz Dailey MD, Last Rate: 200 mL/hr at 04/14/24 1330, 1 g at 04/14/24 1330    midodrine (Proamatine) tablet 20 mg, 20 mg, orogastric tube, q8h, Mariluz Dailey MD, 20 mg at 04/14/24 1019    norepinephrine (Levophed) 8 mg in dextrose 5% 250 mL (0.032 mg/mL) infusion (premix), 0.01-0.5 mcg/kg/min (Dosing Weight), intravenous, Continuous, Mariluz Dailey MD, Stopped at 04/13/24 1356    oxyCODONE (Roxicodone) immediate release tablet 5 mg, 5 mg, oral, q6h, Mariluz Dailey MD, 5 mg at 04/14/24 1031    oxygen (O2) therapy, , inhalation, Continuous - Inhalation, Mariluz Dailey MD, Rate Verify at 04/14/24 0825    pantoprazole (ProtoNix) injection 40 mg, 40 mg, intravenous, Daily, Mariluz Dailey MD, 40 mg at 04/14/24 0800    simethicone (Mylicon) chewable tablet 40 mg, 40 mg, nasogastric tube, 4x daily, Mariluz Dailey MD    sodium chloride (Ocean) 0.65 % nasal spray 1 spray, 1 spray, Each Nostril, 4x daily PRN, Mariluz Dailey MD      Assessment/Plan   Jason Hollins is a 63 y.o. 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 Hawkins and Dr. Smith on 3/5/24. On 3/20, he developed massive PE and was given TPA, taken to OR in setting of increased swelling at flap site- hematoma for exploration and evacuation. Nephrology following for RUTH.    #Acute Kidney Injury on Dialysis (RUTH-D), anuric  - etiology hemodynamic from hemorrhagic shock  - began on CVVH since 3/21 via R I J trialysis and transitioned to SLED on 4/2  - Cr baseline 0.6  - electrolytes: K 3.8 and HCO3 24 WNL, phos 2.6  - urine output 0mL in 24h; ; I/O: intake 1.8L and output 2.2L = net - 0.4L  - FiO2 40% on - trach  - off levophed 4/13  - s/p SLED 4/11-4/12 1.3L fluid removal  - s/p SLED 4/12-4/13 2.0L fluid removed  - s/p SLED 4/13 during day with 2L fluid removed    Myxoid Chondrosarcoma s/p wide resection 3/5/2024  Thrombocytopenia  - s/p OR on 4/11 for thigh debridement    Plan  - no need for dialysis today  - d/w CTICU about plan to exchange current trialysis for new catheter as current access has been in place >14 days  - continue bladder scan once per shift to monitor for renal recovery  - will re-evaluate tomorrow regarding dialysis needs; if hemodynamcis stable and has dialysis access will consider intermittent hemodialysis    D/w Dr. Kunal Duffy MD  Nephrology Fellow PGY 5  Contact via secure chat  Nephrology Pager # [phone] ([phone])     I saw and evaluated the patient. I personally obtained the key and critical portions of the history and physical exam or was physically present for key and critical portions performed by the resident/fellow. I reviewed the resident/fellow's documentation and discussed the patient with the resident/fellow. I agree with the resident/fellow's medical decision making as documented in the note.    Plan IHD with UF  as tolerated tomorrow if remains hemodynamically stable     Leigh Syed MD MPH

## 2024-04-15 ENCOUNTER — APPOINTMENT (OUTPATIENT)
Dept: DIALYSIS | Facility: HOSPITAL | Age: 64
End: 2024-04-15
Payer: COMMERCIAL

## 2024-04-15 LAB
ALBUMIN SERPL BCP-MCNC: 3.2 G/DL (ref 3.4–5)
ALBUMIN SERPL BCP-MCNC: 3.5 G/DL (ref 3.4–5)
ALP SERPL-CCNC: 180 U/L (ref 33–136)
ALT SERPL W P-5'-P-CCNC: 4 U/L (ref 10–52)
ANION GAP BLDA CALCULATED.4IONS-SCNC: 13 MMO/L (ref 10–25)
ANION GAP BLDA CALCULATED.4IONS-SCNC: 14 MMO/L (ref 10–25)
ANION GAP SERPL CALC-SCNC: 16 MMOL/L (ref 10–20)
ANION GAP SERPL CALC-SCNC: 20 MMOL/L (ref 10–20)
APTT PPP: 37 SECONDS (ref 27–38)
AST SERPL W P-5'-P-CCNC: 70 U/L (ref 9–39)
BASE EXCESS BLDA CALC-SCNC: -1.9 MMOL/L (ref -2–3)
BASE EXCESS BLDA CALC-SCNC: 1.2 MMOL/L (ref -2–3)
BILIRUB DIRECT SERPL-MCNC: 18 MG/DL (ref 0–0.3)
BILIRUB SERPL-MCNC: 25.8 MG/DL (ref 0–1.2)
BODY TEMPERATURE: 37 DEGREES CELSIUS
BODY TEMPERATURE: 37 DEGREES CELSIUS
BUN SERPL-MCNC: 51 MG/DL (ref 6–23)
BUN SERPL-MCNC: 78 MG/DL (ref 6–23)
CA-I BLD-SCNC: 1.1 MMOL/L (ref 1.1–1.33)
CA-I BLD-SCNC: 1.14 MMOL/L (ref 1.1–1.33)
CA-I BLDA-SCNC: 1.14 MMOL/L (ref 1.1–1.33)
CA-I BLDA-SCNC: 1.18 MMOL/L (ref 1.1–1.33)
CALCIUM SERPL-MCNC: 8.4 MG/DL (ref 8.6–10.6)
CALCIUM SERPL-MCNC: 8.9 MG/DL (ref 8.6–10.6)
CHLORIDE BLDA-SCNC: 98 MMOL/L (ref 98–107)
CHLORIDE BLDA-SCNC: 98 MMOL/L (ref 98–107)
CHLORIDE SERPL-SCNC: 97 MMOL/L (ref 98–107)
CHLORIDE SERPL-SCNC: 97 MMOL/L (ref 98–107)
CO2 SERPL-SCNC: 22 MMOL/L (ref 21–32)
CO2 SERPL-SCNC: 25 MMOL/L (ref 21–32)
CREAT SERPL-MCNC: 1.79 MG/DL (ref 0.5–1.3)
CREAT SERPL-MCNC: 2.43 MG/DL (ref 0.5–1.3)
EGFRCR SERPLBLD CKD-EPI 2021: 29 ML/MIN/1.73M*2
EGFRCR SERPLBLD CKD-EPI 2021: 42 ML/MIN/1.73M*2
ERYTHROCYTE [DISTWIDTH] IN BLOOD BY AUTOMATED COUNT: 20.5 % (ref 11.5–14.5)
ERYTHROCYTE [DISTWIDTH] IN BLOOD BY AUTOMATED COUNT: 21.1 % (ref 11.5–14.5)
GLUCOSE BLD MANUAL STRIP-MCNC: 189 MG/DL (ref 74–99)
GLUCOSE BLD MANUAL STRIP-MCNC: 191 MG/DL (ref 74–99)
GLUCOSE BLD MANUAL STRIP-MCNC: 193 MG/DL (ref 74–99)
GLUCOSE BLD MANUAL STRIP-MCNC: 194 MG/DL (ref 74–99)
GLUCOSE BLD MANUAL STRIP-MCNC: 206 MG/DL (ref 74–99)
GLUCOSE BLD MANUAL STRIP-MCNC: 224 MG/DL (ref 74–99)
GLUCOSE BLD MANUAL STRIP-MCNC: 258 MG/DL (ref 74–99)
GLUCOSE BLDA-MCNC: 198 MG/DL (ref 74–99)
GLUCOSE BLDA-MCNC: 264 MG/DL (ref 74–99)
GLUCOSE SERPL-MCNC: 190 MG/DL (ref 74–99)
GLUCOSE SERPL-MCNC: 254 MG/DL (ref 74–99)
HCO3 BLDA-SCNC: 21.9 MMOL/L (ref 22–26)
HCO3 BLDA-SCNC: 24.7 MMOL/L (ref 22–26)
HCT VFR BLD AUTO: 24.5 % (ref 41–52)
HCT VFR BLD AUTO: 24.6 % (ref 41–52)
HCT VFR BLD EST: 28 % (ref 41–52)
HCT VFR BLD EST: 30 % (ref 41–52)
HGB BLD-MCNC: 8.9 G/DL (ref 13.5–17.5)
HGB BLD-MCNC: 9.1 G/DL (ref 13.5–17.5)
HGB BLDA-MCNC: 9.3 G/DL (ref 13.5–17.5)
HGB BLDA-MCNC: 9.9 G/DL (ref 13.5–17.5)
INHALED O2 CONCENTRATION: 40 %
INHALED O2 CONCENTRATION: 40 %
INR PPP: 1.5 (ref 0.9–1.1)
LACTATE BLDA-SCNC: 1.7 MMOL/L (ref 0.4–2)
LACTATE BLDA-SCNC: 2.4 MMOL/L (ref 0.4–2)
MAGNESIUM SERPL-MCNC: 2.06 MG/DL (ref 1.6–2.4)
MAGNESIUM SERPL-MCNC: 2.31 MG/DL (ref 1.6–2.4)
MCH RBC QN AUTO: 28.2 PG (ref 26–34)
MCH RBC QN AUTO: 28.7 PG (ref 26–34)
MCHC RBC AUTO-ENTMCNC: 36.3 G/DL (ref 32–36)
MCHC RBC AUTO-ENTMCNC: 37 G/DL (ref 32–36)
MCV RBC AUTO: 78 FL (ref 80–100)
MCV RBC AUTO: 78 FL (ref 80–100)
NRBC BLD-RTO: 0.8 /100 WBCS (ref 0–0)
NRBC BLD-RTO: 1.2 /100 WBCS (ref 0–0)
OXYHGB MFR BLDA: 93.2 % (ref 94–98)
OXYHGB MFR BLDA: 93.6 % (ref 94–98)
PCO2 BLDA: 33 MM HG (ref 38–42)
PCO2 BLDA: 34 MM HG (ref 38–42)
PH BLDA: 7.43 PH (ref 7.38–7.42)
PH BLDA: 7.47 PH (ref 7.38–7.42)
PHOSPHATE SERPL-MCNC: 2.2 MG/DL (ref 2.5–4.9)
PHOSPHATE SERPL-MCNC: 3 MG/DL (ref 2.5–4.9)
PLATELET # BLD AUTO: 111 X10*3/UL (ref 150–450)
PLATELET # BLD AUTO: 141 X10*3/UL (ref 150–450)
PO2 BLDA: 73 MM HG (ref 85–95)
PO2 BLDA: 74 MM HG (ref 85–95)
POTASSIUM BLDA-SCNC: 3.8 MMOL/L (ref 3.5–5.3)
POTASSIUM BLDA-SCNC: 3.9 MMOL/L (ref 3.5–5.3)
POTASSIUM SERPL-SCNC: 3.8 MMOL/L (ref 3.5–5.3)
POTASSIUM SERPL-SCNC: 3.9 MMOL/L (ref 3.5–5.3)
PROT SERPL-MCNC: 5.2 G/DL (ref 6.4–8.2)
PROTHROMBIN TIME: 16.8 SECONDS (ref 9.8–12.8)
RBC # BLD AUTO: 3.16 X10*6/UL (ref 4.5–5.9)
RBC # BLD AUTO: 3.17 X10*6/UL (ref 4.5–5.9)
SAO2 % BLDA: 97 % (ref 94–100)
SAO2 % BLDA: 97 % (ref 94–100)
SODIUM BLDA-SCNC: 130 MMOL/L (ref 136–145)
SODIUM BLDA-SCNC: 132 MMOL/L (ref 136–145)
SODIUM SERPL-SCNC: 134 MMOL/L (ref 136–145)
SODIUM SERPL-SCNC: 135 MMOL/L (ref 136–145)
WBC # BLD AUTO: 8.4 X10*3/UL (ref 4.4–11.3)
WBC # BLD AUTO: 8.5 X10*3/UL (ref 4.4–11.3)

## 2024-04-15 PROCEDURE — 84132 ASSAY OF SERUM POTASSIUM: CPT | Performed by: STUDENT IN AN ORGANIZED HEALTH CARE EDUCATION/TRAINING PROGRAM

## 2024-04-15 PROCEDURE — 84100 ASSAY OF PHOSPHORUS: CPT | Performed by: STUDENT IN AN ORGANIZED HEALTH CARE EDUCATION/TRAINING PROGRAM

## 2024-04-15 PROCEDURE — 99222 1ST HOSP IP/OBS MODERATE 55: CPT | Performed by: INTERNAL MEDICINE

## 2024-04-15 PROCEDURE — 2500000004 HC RX 250 GENERAL PHARMACY W/ HCPCS (ALT 636 FOR OP/ED): Mod: JZ

## 2024-04-15 PROCEDURE — P9045 ALBUMIN (HUMAN), 5%, 250 ML: HCPCS | Mod: JZ

## 2024-04-15 PROCEDURE — 99291 CRITICAL CARE FIRST HOUR: CPT

## 2024-04-15 PROCEDURE — 2500000001 HC RX 250 WO HCPCS SELF ADMINISTERED DRUGS (ALT 637 FOR MEDICARE OP): Performed by: STUDENT IN AN ORGANIZED HEALTH CARE EDUCATION/TRAINING PROGRAM

## 2024-04-15 PROCEDURE — 8010000001 HC DIALYSIS - HEMODIALYSIS PER DAY

## 2024-04-15 PROCEDURE — 94003 VENT MGMT INPAT SUBQ DAY: CPT

## 2024-04-15 PROCEDURE — 37799 UNLISTED PX VASCULAR SURGERY: CPT | Performed by: STUDENT IN AN ORGANIZED HEALTH CARE EDUCATION/TRAINING PROGRAM

## 2024-04-15 PROCEDURE — 2500000004 HC RX 250 GENERAL PHARMACY W/ HCPCS (ALT 636 FOR OP/ED): Performed by: STUDENT IN AN ORGANIZED HEALTH CARE EDUCATION/TRAINING PROGRAM

## 2024-04-15 PROCEDURE — 82330 ASSAY OF CALCIUM: CPT | Performed by: STUDENT IN AN ORGANIZED HEALTH CARE EDUCATION/TRAINING PROGRAM

## 2024-04-15 PROCEDURE — P9047 ALBUMIN (HUMAN), 25%, 50ML: HCPCS | Mod: JZ | Performed by: STUDENT IN AN ORGANIZED HEALTH CARE EDUCATION/TRAINING PROGRAM

## 2024-04-15 PROCEDURE — C9113 INJ PANTOPRAZOLE SODIUM, VIA: HCPCS | Performed by: STUDENT IN AN ORGANIZED HEALTH CARE EDUCATION/TRAINING PROGRAM

## 2024-04-15 PROCEDURE — 85610 PROTHROMBIN TIME: CPT | Performed by: STUDENT IN AN ORGANIZED HEALTH CARE EDUCATION/TRAINING PROGRAM

## 2024-04-15 PROCEDURE — 82247 BILIRUBIN TOTAL: CPT | Performed by: STUDENT IN AN ORGANIZED HEALTH CARE EDUCATION/TRAINING PROGRAM

## 2024-04-15 PROCEDURE — 83735 ASSAY OF MAGNESIUM: CPT | Performed by: STUDENT IN AN ORGANIZED HEALTH CARE EDUCATION/TRAINING PROGRAM

## 2024-04-15 PROCEDURE — 85027 COMPLETE CBC AUTOMATED: CPT | Performed by: STUDENT IN AN ORGANIZED HEALTH CARE EDUCATION/TRAINING PROGRAM

## 2024-04-15 PROCEDURE — 94762 N-INVAS EAR/PLS OXIMTRY CONT: CPT

## 2024-04-15 PROCEDURE — 2020000001 HC ICU ROOM DAILY

## 2024-04-15 PROCEDURE — 2500000002 HC RX 250 W HCPCS SELF ADMINISTERED DRUGS (ALT 637 FOR MEDICARE OP, ALT 636 FOR OP/ED): Performed by: STUDENT IN AN ORGANIZED HEALTH CARE EDUCATION/TRAINING PROGRAM

## 2024-04-15 PROCEDURE — 82947 ASSAY GLUCOSE BLOOD QUANT: CPT

## 2024-04-15 PROCEDURE — 99232 SBSQ HOSP IP/OBS MODERATE 35: CPT | Performed by: PHYSICIAN ASSISTANT

## 2024-04-15 RX ORDER — LIDOCAINE HCL/PF 100 MG/5ML
SYRINGE (ML) INTRAVENOUS
Status: DISPENSED
Start: 2024-04-15 | End: 2024-04-15

## 2024-04-15 RX ORDER — ALBUMIN HUMAN 50 G/1000ML
12.5 SOLUTION INTRAVENOUS ONCE
Status: COMPLETED | OUTPATIENT
Start: 2024-04-15 | End: 2024-04-15

## 2024-04-15 RX ORDER — ALBUMIN HUMAN 250 G/1000ML
25 SOLUTION INTRAVENOUS ONCE
Status: COMPLETED | OUTPATIENT
Start: 2024-04-15 | End: 2024-04-15

## 2024-04-15 RX ORDER — LIDOCAINE HYDROCHLORIDE 10 MG/ML
INJECTION INFILTRATION; PERINEURAL
Status: DISPENSED
Start: 2024-04-15 | End: 2024-04-16

## 2024-04-15 RX ORDER — ALBUMIN HUMAN 50 G/1000ML
SOLUTION INTRAVENOUS
Status: COMPLETED
Start: 2024-04-15 | End: 2024-04-15

## 2024-04-15 RX ADMIN — INSULIN LISPRO 4 UNITS: 100 INJECTION, SOLUTION INTRAVENOUS; SUBCUTANEOUS at 04:19

## 2024-04-15 RX ADMIN — ALBUMIN HUMAN 12.5 G: 50 SOLUTION INTRAVENOUS at 22:53

## 2024-04-15 RX ADMIN — INSULIN LISPRO 4 UNITS: 100 INJECTION, SOLUTION INTRAVENOUS; SUBCUTANEOUS at 00:52

## 2024-04-15 RX ADMIN — OXYCODONE HYDROCHLORIDE 5 MG: 5 TABLET ORAL at 05:32

## 2024-04-15 RX ADMIN — MIDODRINE HYDROCHLORIDE 20 MG: 10 TABLET ORAL at 17:33

## 2024-04-15 RX ADMIN — INSULIN GLARGINE 20 UNITS: 100 INJECTION, SOLUTION SUBCUTANEOUS at 09:14

## 2024-04-15 RX ADMIN — INSULIN LISPRO 4 UNITS: 100 INJECTION, SOLUTION INTRAVENOUS; SUBCUTANEOUS at 17:39

## 2024-04-15 RX ADMIN — INSULIN GLARGINE 20 UNITS: 100 INJECTION, SOLUTION SUBCUTANEOUS at 20:32

## 2024-04-15 RX ADMIN — ALBUMIN HUMAN 25 G: 0.25 SOLUTION INTRAVENOUS at 20:32

## 2024-04-15 RX ADMIN — FLUDROCORTISONE ACETATE 0.1 MG: 0.1 TABLET ORAL at 00:42

## 2024-04-15 RX ADMIN — ALBUMIN HUMAN 25 G: 0.25 SOLUTION INTRAVENOUS at 22:52

## 2024-04-15 RX ADMIN — HYDROCORTISONE SODIUM SUCCINATE 50 MG: 100 INJECTION, POWDER, FOR SOLUTION INTRAMUSCULAR; INTRAVENOUS at 10:50

## 2024-04-15 RX ADMIN — OXYCODONE HYDROCHLORIDE 5 MG: 5 TABLET ORAL at 10:54

## 2024-04-15 RX ADMIN — ALBUMIN HUMAN 25 G: 0.25 SOLUTION INTRAVENOUS at 05:32

## 2024-04-15 RX ADMIN — MIDODRINE HYDROCHLORIDE 20 MG: 10 TABLET ORAL at 02:52

## 2024-04-15 RX ADMIN — OXYCODONE HYDROCHLORIDE 5 MG: 5 TABLET ORAL at 16:37

## 2024-04-15 RX ADMIN — FLUDROCORTISONE ACETATE 0.1 MG: 0.1 TABLET ORAL at 17:37

## 2024-04-15 RX ADMIN — INSULIN LISPRO 8 UNITS: 100 INJECTION, SOLUTION INTRAVENOUS; SUBCUTANEOUS at 08:12

## 2024-04-15 RX ADMIN — ALBUMIN HUMAN 12.5 G: 0.05 INJECTION, SOLUTION INTRAVENOUS at 22:53

## 2024-04-15 RX ADMIN — SIMETHICONE 40 MG: 80 TABLET, CHEWABLE ORAL at 09:18

## 2024-04-15 RX ADMIN — MEROPENEM 1 G: 1 INJECTION, POWDER, FOR SOLUTION INTRAVENOUS at 02:52

## 2024-04-15 RX ADMIN — HEPARIN SODIUM 5000 UNITS: 5000 INJECTION INTRAVENOUS; SUBCUTANEOUS at 05:34

## 2024-04-15 RX ADMIN — HYDROCORTISONE SODIUM SUCCINATE 50 MG: 100 INJECTION, POWDER, FOR SOLUTION INTRAMUSCULAR; INTRAVENOUS at 02:52

## 2024-04-15 RX ADMIN — MIDODRINE HYDROCHLORIDE 20 MG: 10 TABLET ORAL at 10:53

## 2024-04-15 RX ADMIN — HYDROMORPHONE HYDROCHLORIDE 0.2 MG: 1 INJECTION, SOLUTION INTRAMUSCULAR; INTRAVENOUS; SUBCUTANEOUS at 21:01

## 2024-04-15 RX ADMIN — ALBUMIN HUMAN 25 G: 0.25 SOLUTION INTRAVENOUS at 13:15

## 2024-04-15 RX ADMIN — HYDROCORTISONE SODIUM SUCCINATE 50 MG: 100 INJECTION, POWDER, FOR SOLUTION INTRAMUSCULAR; INTRAVENOUS at 18:24

## 2024-04-15 RX ADMIN — INSULIN LISPRO 12 UNITS: 100 INJECTION, SOLUTION INTRAVENOUS; SUBCUTANEOUS at 20:31

## 2024-04-15 RX ADMIN — MEROPENEM 1 G: 1 INJECTION, POWDER, FOR SOLUTION INTRAVENOUS at 17:37

## 2024-04-15 RX ADMIN — HEPARIN SODIUM 5000 UNITS: 5000 INJECTION INTRAVENOUS; SUBCUTANEOUS at 17:33

## 2024-04-15 RX ADMIN — PANTOPRAZOLE SODIUM 40 MG: 40 INJECTION, POWDER, FOR SOLUTION INTRAVENOUS at 08:10

## 2024-04-15 ASSESSMENT — PAIN DESCRIPTION - LOCATION: LOCATION: LEG

## 2024-04-15 ASSESSMENT — PAIN DESCRIPTION - ORIENTATION: ORIENTATION: RIGHT

## 2024-04-15 ASSESSMENT — PAIN SCALES - GENERAL
PAINLEVEL_OUTOF10: 7
PAINLEVEL_OUTOF10: 8
PAINLEVEL_OUTOF10: 0 - NO PAIN
PAINLEVEL_OUTOF10: 0 - NO PAIN

## 2024-04-15 NOTE — PROGRESS NOTES
Physical Therapy                 Therapy Communication Note    Patient Name: Jason Hollins  MRN: [id number]  Today's Date: 4/15/2024     Discipline: Physical Therapy     Missed Visit Reason:  (1233: Pt currently on HD, scheduled to run until 1545, will re-attempt PT as schedule permits)    Missed Time: Attempt

## 2024-04-15 NOTE — PROGRESS NOTES
Subjective   Interval History:        Patient seen during mid afternoon rounds and discussed with ICU team.       Patient awake and intubated.  Tracks.  Uses thumbs up to signal agreement.  Patient's son present.       4/11/2024 closure of right leg wound and application of wound VAC on the buttock.  (Not certain if this is a sponge VAC or incisional Prevena wound VAC but will investigate further).        Objective   Range of Vitals (last 24 hours)  Heart Rate:  []   Temp:  [36.1 °C (97 °F)-36.9 °C (98.4 °F)]   Resp:  [0-31]   BP: (108-165)/(55-77)   SpO2:  [96 %-100 %]   Daily Weight  04/13/24 : 119 kg (261 lb 14.5 oz)    Body mass index is 37.58 kg/m².    Physical Exam  Exam limited as nurse was ready to roll and clean the patient  Patient thumbs up feeling well  Alert  Tracking  Anterolateral chest appeared clear  Diffuse anasarca  Large right incision over right thigh and buttock.  ANDERSON drains with serosanguineous fluid present.  Unable to visualize wound VAC location specifically.  Area edematous and appropriately erythematous postoperatively.      Labs  Results from last 72 hours   Lab Units 04/14/24  1531 04/14/24  0234 04/13/24  0800   WBC AUTO x10*3/uL 7.5 8.4 11.0   HEMOGLOBIN g/dL 9.0* 8.5* 10.0*   HEMATOCRIT % 24.3* 23.6* 27.8*   PLATELETS AUTO x10*3/uL 101* 88* 98*     Results from last 72 hours   Lab Units 04/14/24  0240 04/14/24  0234 04/13/24  1012   SODIUM mmol/L 134* 134* 134*   POTASSIUM mmol/L 3.8 3.8 3.7   CHLORIDE mmol/L 97* 97* 97*   CO2 mmol/L 24 23 26   BUN mg/dL 58* 58* 42*   CREATININE mg/dL 1.94* 1.96* 1.58*   GLUCOSE mg/dL 216* 217* 243*   CALCIUM mg/dL 8.4* 8.4* 8.7   ANION GAP mmol/L 17 18 15   EGFR mL/min/1.73m*2 38* 38* 49*   PHOSPHORUS mg/dL 2.6 2.8 3.8     Results from last 72 hours   Lab Units 04/14/24  0240 04/14/24  0234 04/13/24  1012 04/13/24  0800 04/12/24  0924 04/12/24  0814   ALK PHOS U/L  --  177*  --  197*  --  232*   BILIRUBIN TOTAL mg/dL  --  26.8*  --  28.8*  --   "24.7*   BILIRUBIN DIRECT mg/dL  --  18.6*  --  19.8*  --  16.8*   PROTEIN TOTAL g/dL  --  5.4*  --  5.7*  --  5.2*   ALT U/L  --  5*  --  6*  --  7*   AST U/L  --  62*  --  70*  --  80*   ALBUMIN g/dL 3.2* 3.2* 3.0* 3.1*   < > 2.4*    < > = values in this interval not displayed.     Estimated Creatinine Clearance: 51 mL/min (A) (by C-G formula based on SCr of 1.94 mg/dL (H)).  No results found for: \"CRP\"  Microbiology  Susceptibility data from last 14 days.  Collected Specimen Info Organism Amoxicillin/Clavulanate Ampicillin Ampicillin/Sulbactam Aztreonam Cefazolin Cefepime Ciprofloxacin Ertapenem Gentamicin Gentamicin Synergy Imipenem Levofloxacin Meropenem   04/11/24 Tissue from ABSCESS Klebsiella (Enterobacter) aerogenes R R R  R S S  S  R S    04/03/24 Blood culture from Peripheral Venipuncture Klebsiella (Enterobacter) aerogenes                04/03/24 Blood culture from Peripheral Venipuncture Klebsiella (Enterobacter) aerogenes R R R S R S S S S  I S S   04/03/24 Fluid from BAL Enterococcus faecalis  S        S        Klebsiella (Enterobacter) aerogenes R R R  R S S  S  S S      Collected Specimen Info Organism Penicillin Piperacillin/Tazobactam Tetracycline Trimethoprim/Sulfamethoxazole Vancomycin   04/11/24 Tissue from ABSCESS Klebsiella (Enterobacter) aerogenes  S  S    04/03/24 Blood culture from Peripheral Venipuncture Klebsiella (Enterobacter) aerogenes        04/03/24 Blood culture from Peripheral Venipuncture Klebsiella (Enterobacter) aerogenes  R  S    04/03/24 Fluid from BAL Enterococcus faecalis S  R R S     Klebsiella (Enterobacter) aerogenes  S  S       Assessment/Plan        63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 Course subsequently included PEA arrest in setting of PE 3/20, hemorrhagic shock from R thigh wound bleeding, trach 4/1 with trach revision 4/4 in setting of bleeding.     He " developed a K. Aerogenes bacteremia in setting of VAP, for which he was planned for 10 days of therapy. His procedure was delayed until today, and in the setting of recent and now resolved hypotension, we were contacted regarding further management. Depending on depth of wound and results of any obtained cultures, we can finalize a course.    04/14/2024 Update:       4/3/2024 BAL growing Klebsiella aerogenes       4/3/2024 blood growing  Klebsiella aerogenes       4/11/2024 surgical specimen from gluteal closure and reconstruction growing Klebsiella aerogenes.         I reviewed susceptibility testing patterns for all 3 isolates.  Some subtle differences but suspect these are all similar reflecting patient's colonization status with Klebsiella originates.  On 4/11/2024 patient had tissue advancement and closure of complex right leg and buttock wound.  Large area of necrotic tissue was removed and culture positive for Klebsiella aerogenes.       Therefore would extend meropenem treatment course an additional 7 days while monitoring for postoperative healing or complications.           THUS new stop date tentatively set in 7 days would be 4/21/2024.  At that time would need to reassess and consider stopping or extending further depending on clinical status and wound healing.      Recommendations:  1.  Continue meropenem            Prior 2 week stop date was 4/14/24 and based on 4/3/2024 bacteremia            Extending meropenem treatment given presence of some necrotic tissue in the gluteal surgical site.              Tissue was excised and closure performed on 4/11/2024.                Thus will extend treatment through 4/21/2024       Discussed with primary team  Discussed with patient and his son     ID team A will follow  ID team a pager [phone]    Juan Diego MD  I spent 60 minutes in the professional and overall care of this patient.

## 2024-04-15 NOTE — PROGRESS NOTES
"    Department of Plastic and Reconstructive Surgery  Daily Progress Note    Patient Name: Jason Hollins  MRN: [id number]  Date:  04/15/24     Subjective  Found resting in bed comfortably in [facility], with niece at bedside. Remains critically ill, trach to vent, giving thumbs up when asked he how was today. Following commands and shaking head appropriately.     Objective    Vital Signs  /66   Pulse 85   Temp 36 °C (96.8 °F) (Temporal)   Resp (!) 0   Ht 1.778 m (5' 10\")   Wt 119 kg (261 lb 14.5 oz)   SpO2 96%   BMI 37.58 kg/m²      Physical Exam   Constitutional: Alert, lying in pressure relieving bed in ICU, appears critically ill.   ENMT: MMM, dobhoff in R nare.  Cardiovascular: RRR on telemetry monitor.  Respiratory/Thorax: Trach to vent.  Gastrointestinal: Abdomen soft, protuberant.   Musculoskeletal: Able to squeeze bilateral hands, wiggles toes on the left, minimal movement on RLE, effort to move foot observed.   Extremities: Generalized pitting edema. Right thigh edematous, but soft and compressible, no bruising or tissue induration noted. Visible portion of flap recipient site and adjacent anterior R thigh incisions appear stable. Posterior flap incision line and posterior thigh/gluteal region incision dressed with incisional wound vac, maintaining low continuous suction at -75mmHg, no alarms for leak, obstruction or malfunction, expectantly no output in collecting canister. ANDERSON drain x 3 right upper leg with dark serous outputs.  Neurological: Off sedation, alert and able to respond to commands with head nod.   Skin: Edematous, juandice, warm.     Diagnostics   Results for orders placed or performed during the hospital encounter of 03/05/24 (from the past 24 hour(s))   POCT GLUCOSE   Result Value Ref Range    POCT Glucose 184 (H) 74 - 99 mg/dL   CBC   Result Value Ref Range    WBC 7.5 4.4 - 11.3 x10*3/uL    nRBC 1.3 (H) 0.0 - 0.0 /100 WBCs    RBC 3.14 (L) 4.50 - 5.90 x10*6/uL    Hemoglobin 9.0 (L) 13.5 - " 17.5 g/dL    Hematocrit 24.3 (L) 41.0 - 52.0 %    MCV 77 (L) 80 - 100 fL    MCH 28.7 26.0 - 34.0 pg    MCHC 37.0 (H) 32.0 - 36.0 g/dL    RDW 19.9 (H) 11.5 - 14.5 %    Platelets 101 (L) 150 - 450 x10*3/uL   POCT GLUCOSE   Result Value Ref Range    POCT Glucose 187 (H) 74 - 99 mg/dL   Blood Gas Arterial Full Panel   Result Value Ref Range    POCT pH, Arterial 7.46 (H) 7.38 - 7.42 pH    POCT pCO2, Arterial 30 (L) 38 - 42 mm Hg    POCT pO2, Arterial 81 (L) 85 - 95 mm Hg    POCT SO2, Arterial 97 94 - 100 %    POCT Oxy Hemoglobin, Arterial 94.0 94.0 - 98.0 %    POCT Hematocrit Calculated, Arterial 29.0 (L) 41.0 - 52.0 %    POCT Sodium, Arterial 132 (L) 136 - 145 mmol/L    POCT Potassium, Arterial 3.8 3.5 - 5.3 mmol/L    POCT Chloride, Arterial 99 98 - 107 mmol/L    POCT Ionized Calcium, Arterial 1.11 1.10 - 1.33 mmol/L    POCT Glucose, Arterial 180 (H) 74 - 99 mg/dL    POCT Lactate, Arterial 1.6 0.4 - 2.0 mmol/L    POCT Base Excess, Arterial -1.9 -2.0 - 3.0 mmol/L    POCT HCO3 Calculated, Arterial 21.3 (L) 22.0 - 26.0 mmol/L    POCT Hemoglobin, Arterial 9.8 (L) 13.5 - 17.5 g/dL    POCT Anion Gap, Arterial 16 10 - 25 mmo/L    Patient Temperature 37.0 degrees Celsius    FiO2 40 %   POCT GLUCOSE   Result Value Ref Range    POCT Glucose 208 (H) 74 - 99 mg/dL   POCT GLUCOSE   Result Value Ref Range    POCT Glucose 189 (H) 74 - 99 mg/dL   Blood Gas Arterial Full Panel   Result Value Ref Range    POCT pH, Arterial 7.43 (H) 7.38 - 7.42 pH    POCT pCO2, Arterial 33 (L) 38 - 42 mm Hg    POCT pO2, Arterial 73 (L) 85 - 95 mm Hg    POCT SO2, Arterial 97 94 - 100 %    POCT Oxy Hemoglobin, Arterial 93.2 (L) 94.0 - 98.0 %    POCT Hematocrit Calculated, Arterial 30.0 (L) 41.0 - 52.0 %    POCT Sodium, Arterial 130 (L) 136 - 145 mmol/L    POCT Potassium, Arterial 3.8 3.5 - 5.3 mmol/L    POCT Chloride, Arterial 98 98 - 107 mmol/L    POCT Ionized Calcium, Arterial 1.18 1.10 - 1.33 mmol/L    POCT Glucose, Arterial 198 (H) 74 - 99 mg/dL    POCT  Lactate, Arterial 2.4 (H) 0.4 - 2.0 mmol/L    POCT Base Excess, Arterial -1.9 -2.0 - 3.0 mmol/L    POCT HCO3 Calculated, Arterial 21.9 (L) 22.0 - 26.0 mmol/L    POCT Hemoglobin, Arterial 9.9 (L) 13.5 - 17.5 g/dL    POCT Anion Gap, Arterial 14 10 - 25 mmo/L    Patient Temperature 37.0 degrees Celsius    FiO2 40 %   CALCIUM, IONIZED   Result Value Ref Range    POCT Calcium, Ionized 1.14 1.1 - 1.33 mmol/L   CBC   Result Value Ref Range    WBC 8.4 4.4 - 11.3 x10*3/uL    nRBC 1.2 (H) 0.0 - 0.0 /100 WBCs    RBC 3.16 (L) 4.50 - 5.90 x10*6/uL    Hemoglobin 8.9 (L) 13.5 - 17.5 g/dL    Hematocrit 24.5 (L) 41.0 - 52.0 %    MCV 78 (L) 80 - 100 fL    MCH 28.2 26.0 - 34.0 pg    MCHC 36.3 (H) 32.0 - 36.0 g/dL    RDW 20.5 (H) 11.5 - 14.5 %    Platelets 111 (L) 150 - 450 x10*3/uL   Magnesium   Result Value Ref Range    Magnesium 2.31 1.60 - 2.40 mg/dL   Renal Function Panel   Result Value Ref Range    Glucose 190 (H) 74 - 99 mg/dL    Sodium 135 (L) 136 - 145 mmol/L    Potassium 3.8 3.5 - 5.3 mmol/L    Chloride 97 (L) 98 - 107 mmol/L    Bicarbonate 22 21 - 32 mmol/L    Anion Gap 20 10 - 20 mmol/L    Urea Nitrogen 78 (H) 6 - 23 mg/dL    Creatinine 2.43 (H) 0.50 - 1.30 mg/dL    eGFR 29 (L) >60 mL/min/1.73m*2    Calcium 8.9 8.6 - 10.6 mg/dL    Phosphorus 3.0 2.5 - 4.9 mg/dL    Albumin 3.5 3.4 - 5.0 g/dL   POCT GLUCOSE   Result Value Ref Range    POCT Glucose 193 (H) 74 - 99 mg/dL   POCT GLUCOSE   Result Value Ref Range    POCT Glucose 206 (H) 74 - 99 mg/dL     XR chest 1 view    Result Date: 4/14/2024  Interpreted By:  Genaro Hirsch, STUDY: XR CHEST 1 VIEW; 4/14/2024 10:08 am   INDICATION: Signs/Symptoms:Pleural effusion.   COMPARISON: 04/13/2024   ACCESSION NUMBER(S): [id number]   ORDERING CLINICIAN: LUIS WRIGHT   FINDINGS: Tracheostomy cannula is stable. Enteric tube is in place with the tip outside the field of view. Right IJ central venous catheter is projecting over upper CC.   Cardiac silhouette size is stable.    Bilateral mid and lower lung opacity, right more than left, unchanged compared to prior study. Increased interstitial markings. No sizable pneumothorax.   No acute osseous abnormality.       1. Bilateral mid and lower lung opacity with blunting of the costophrenic angles which are likely due to combination of effusion, atelectasis and edema. Correlate with concern for superimposed infection. No significant interval change.       Signed by: Genaro Avila 4/14/2024 10:30 AM Dictation workstation:   [id number]      Current Medications  Scheduled medications  albumin human, 25 g, intravenous, q8h  fludrocortisone, 0.1 mg, nasogastric tube, q12h  heparin (porcine), 5,000 Units, subcutaneous, q8h  hydrocortisone sodium succinate, 50 mg, intravenous, q8h  insulin glargine, 20 Units, subcutaneous, q12h  insulin lispro, 0-20 Units, subcutaneous, q4h  lidocaine (cardiac), , ,   meropenem, 1 g, intravenous, q12h  midodrine, 20 mg, orogastric tube, q8h  oxyCODONE, 5 mg, oral, q6h  oxygen, , inhalation, Continuous - Inhalation  pantoprazole, 40 mg, intravenous, Daily  simethicone, 40 mg, nasogastric tube, 4x daily      Continuous medications  lactated Ringer's, 5 mL/hr, Last Rate: 5 mL/hr (04/14/24 1900)  norepinephrine, 0.01-0.5 mcg/kg/min (Dosing Weight), Last Rate: Stopped (04/13/24 1356)      PRN medications  PRN medications: alteplase, calcium gluconate, calcium gluconate, dextrose, glucagon, HYDROmorphone, HYDROmorphone, lidocaine (cardiac), magnesium sulfate, magnesium sulfate, sodium chloride     Assessment  Jason Gunjan 63 y.o. 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 Hawkins and Dr. Smith on 3/5/24. Postoperative course c/b arrest requiring CPR with ROSC after TPA for assumed large PE on 3/20. Increased swelling at  flap site appreciated overnight 3/20-3/21 and pt returned to OR for exploration of R flap site, evacuation of hematoma, suture ligation of multiple bleeding vessel sites in gluteal area and wound closure with Dr. Smith on 3/21. Pt has remained in ICU for continued critical care evaluation and management postoperatively. Returned to OR 4/11 with plastic surgery for R posterior thigh/gluteal region I&D with tissue advancement/complex closure and application of incisional wound vac.     Plan/Recommendations  - No additional acute surgical interventions planned from plastic surgery perspective   - Maintain incisional wound vac to R posterior thigh wound site per plastic surgery x14 (4/25) days post-op         ·  Settings: -75 mmHg low continuous suction        ·  Monitor/record vac canister output [id number]       ·  Notify plastic surgery with any concerns of leak or obstruction  - Continue ANDERSON drain care to x3 drains present at R thigh flap reconstruction site      ·  Strip drain tubing TID and PRN   - drain output continues to steadily decrease; possibly pull #2 soon     ·  Monitor and record output q8h      ·  Keep drain sites C/D/I      ·  Notify plastics if ANDERSON drain output exceeds > 100 ml/hr in one drain or > 300 ml/hr collectively  - Avoid pressure application or positioning onto flap site or incisions at R upper leg   - Recommend patient be positioned off of R side as able to prevent flap compression/wound breakdown   - Continue clinitron fluidized air mattress  - Appreciate remaining supportive care per ICU   - Plastic Surgery will continue to follow     Patient and plan discussed with Dr. Edwards  Plans for discharge to [facility] within the week, Dr. Edwards aware    Karen Moreno PA-C  Plastic and Reconstructive Surgery   [place]  Pager #[phone]  Team phones: [phone]

## 2024-04-15 NOTE — PROGRESS NOTES
Jason Hollins is a 63 y.o. male on day 41 of admission presenting with Soft tissue sarcoma (Multi).      Subjective   4/15: Pt seen resting in bed. Family member at bedside. Pt is a little tired. BP is good - off vasopressor since Saturday. FiO2 40% to vent via trach. Remains anuric.    Objective          Vitals 24HR  Heart Rate:  [70-98]   Temp:  [36 °C (96.8 °F)-36.9 °C (98.4 °F)]   Resp:  [0-31]   BP: (135-170)/(56-82)   SpO2:  [93 %-100 %]         Intake/Output last 3 Shifts:    Intake/Output Summary (Last 24 hours) at 4/15/2024 1542  Last data filed at 4/15/2024 1200  Gross per 24 hour   Intake 1365 ml   Output 235 ml   Net 1130 ml       Physical Exam  General appearance: intubated, trached  Neck: trach  Heart: RRR  Lungs: FiO2 40% on vent via trach  : no Marino  Extremities: no leg edema  Neuro: alert, follows commands  Dialysis ACCESS:  right I J trialysis    Current Facility-Administered Medications:     albumin human 25 % solution 25 g, 25 g, intravenous, q8h, Mariluz Dailey MD, Stopped at 04/15/24 0632    alteplase (Cathflo Activase) injection 2 mg, 2 mg, intra-catheter, PRN, Mariluz Dailey MD    calcium gluconate in NS IV 1 g, 1 g, intravenous, q6h PRN, Mariluz Dailey MD, Stopped at 04/14/24 0458    calcium gluconate in NS IV 2 g, 2 g, intravenous, q6h PRN, Mariluz Dailey MD, Last Rate: 100 mL/hr at 04/04/24 1118, 2 g at 04/04/24 1118    dextrose 50 % injection 12.5 g, 12.5 g, intravenous, q15 min PRN, Mariluz Dailey MD    fludrocortisone (Florinef) tablet 0.1 mg, 0.1 mg, nasogastric tube, q12h, Mariluz Dailey MD, 0.1 mg at 04/15/24 0042    glucagon (Glucagen) injection 1 mg, 1 mg, intramuscular, q15 min PRN, Mariluz Dailey MD    heparin (porcine) injection 5,000 Units, 5,000 Units, subcutaneous, q8h, Mariluz Dailey MD, 5,000 Units at 04/15/24 0534    hydrocortisone sod succ (PF) (Solu-CORTEF) injection 50 mg, 50 mg, intravenous, q8h, Mariluz Dailey MD, 50 mg at 04/15/24 1050    HYDROmorphone (Dilaudid)  injection 0.2 mg, 0.2 mg, intravenous, q3h PRN, Mariluz Dailey MD, 0.2 mg at 04/12/24 0857    insulin glargine (Lantus) injection 20 Units, 20 Units, subcutaneous, q12h, Mariluz Dailey MD, 20 Units at 04/15/24 0914    insulin lispro (HumaLOG) injection 0-20 Units, 0-20 Units, subcutaneous, q4h, Mariluz Dailey MD, 8 Units at 04/15/24 0812    lactated Ringer's infusion, 5 mL/hr, intravenous, Continuous, Mariluz Dailey MD, Last Rate: 5 mL/hr at 04/14/24 1900, 5 mL/hr at 04/14/24 1900    lidocaine (cardiac) (Xylocaine) injection  - Omnicell Override Pull, , , ,     lidocaine (Xylocaine) injection  - Omnicell Override Pull, , , ,     magnesium sulfate IV 2 g, 2 g, intravenous, q6h PRN, Mariluz Dailey MD, Stopped at 03/24/24 1726    magnesium sulfate IV 4 g, 4 g, intravenous, q6h PRN, Mariluz Dailey MD, Stopped at 04/05/24 0838    meropenem (Merrem) in sodium chloride 0.9 % 100 mL IV 1 g, 1 g, intravenous, q12h, Mariluz Dailey MD, Stopped at 04/15/24 0322    midodrine (Proamatine) tablet 20 mg, 20 mg, orogastric tube, q8h, Mariluz Dailey MD, 20 mg at 04/15/24 1053    norepinephrine (Levophed) 8 mg in dextrose 5% 250 mL (0.032 mg/mL) infusion (premix), 0.01-0.5 mcg/kg/min (Dosing Weight), intravenous, Continuous, Mariluz Dailey MD, Stopped at 04/13/24 1356    oxyCODONE (Roxicodone) immediate release tablet 5 mg, 5 mg, oral, q6h, Mariluz Dailey MD, 5 mg at 04/15/24 1054    oxygen (O2) therapy, , inhalation, Continuous - Inhalation, Mariluz Dailey MD, Rate Verify at 04/14/24 0825    pantoprazole (ProtoNix) injection 40 mg, 40 mg, intravenous, Daily, Mariluz Dailey MD, 40 mg at 04/15/24 0810    sodium chloride (Ocean) 0.65 % nasal spray 1 spray, 1 spray, Each Nostril, 4x daily PRN, Mariluz Dailey MD      Assessment/Plan   Jason Hollins is a 63 y.o. 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 Hawkins and Dr. Smith on 3/5/24. On 3/20, he developed massive PE and was given TPA, taken to OR in setting of increased swelling at flap site- hematoma for exploration and evacuation. Nephrology following for RUTH.    #Acute Kidney Injury on Dialysis (RUTH-D), anuric  - etiology hemodynamic from hemorrhagic shock  - began on CVVH since 3/21 via R I J trialysis and transitioned to SLED on 4/2  - Cr baseline 0.6  - electrolytes: K 3.8 and HCO3 22 WNL  - urine output 0mL in 24h; ; I/O: intake 1.8L and output 0.2L = net + 1.6L  - FiO2 40% on - trach  - off levophed 4/13  - s/p SLED 4/11-4/12 1.3L fluid removal  - s/p SLED 4/12-4/13 2.0L fluid removed  - s/p SLED 4/13 during day with 2L fluid removed    Myxoid Chondrosarcoma s/p wide resection 3/5/2024    Plan  - plan on HD today - aim to remove 1.5-2L as tolerated by hemodynamics  - CTICU aware of plan to exchange current trialysis for new catheter as current access has been in place >14 days  - continue bladder scan once per shift to monitor for renal recovery    D/w Dr. Anatoly Duffy MD  Nephrology Fellow PGY 5  Contact via secure chat  Nephrology Pager # [phone] ([phone])

## 2024-04-15 NOTE — CONSULTS
Consults  History Of Present Illness: Patient was seen today at the trauma ICU, patient was having his dialysis session,  Jason Hollins is a 63 y.o. 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 Hawkins and Dr. Smith on 3/5/24.  Post operative course was uncomplicated and patient was on RNF until 3/20 for prolonged bed rest to avoid hip flexion to maintain flap integrity.  Patient was on prophylactic lovenox while on bedrest.      3/20: Cardiopulmonary arrest s/p CPR with ROSC after TPA, overnight started on heparin, epo, increased pressor requirements, increased swelling at flap site.      3/21: Hemorrhagic shock, MTP. Firm and large right flap site. Return to OR s/p Exploration of right thigh wound, Evacuation of hematoma. Suture ligation of multiple bleeding vessel and several points in gluteal area.      4/1: s/p Trach     4/4: return OR with ENT s/p laryngoscopy, esophagoscopy and bronchoscopy, trach revision. Found extensive clot burden in esophagus and stomach as well as in the lungs. Oozing at thyroid bed cauterized. Trach exchanged to new 6.0 prox XLT shiley. OR findings:  blood up glottis and from thyroid bed to airway.     4/9: Patient is medically stable for OR on 4/11/24.  He is appropriately n.p.o. since midnight and has had more DVT prophylaxis held for OR today.    Vascular medicine service was consulted in regards to his anticoagulation, current patient is on subcu heparin 3 times daily for fear of losing the graft and bleeding patient is on no full anticoagulation dose for his VTE (PE/DVT)     Last Recorded Vitals:  Vitals:    04/15/24 0800 04/15/24 0900 04/15/24 1000 04/15/24 1200   BP: 137/74 149/72 150/66    Pulse: 82 85 85    Resp: (!) 0 (!) 0 (!) 0    Temp: 36 °C (96.8 °F)   36.9 °C (98.4  °F)   TempSrc: Temporal   Temporal   SpO2: 96% 96% 96%    Weight:       Height:           Last Labs:  CBC - 4/15/2024:  1:17 AM  8.4 8.9 111    24.5      CMP - 4/15/2024:  1:19 AM  8.9 5.4 62 --- 26.8   3.0 3.5 5 177      PTT - 4/13/2024:  8:00 AM  1.3   15.1 37     Troponin I, High Sensitivity   Date/Time Value Ref Range Status   03/21/2024 07:26 AM 13,624 (HH) 0 - 53 ng/L Final     Comment:     Previous result verified on 3/21/2024 0405 on specimen/case [id number] called with component [facility] for procedure Troponin I, High Sensitivity with value 5,928 ng/L.   03/20/2024 08:41 PM 5,928 (HH) 0 - 53 ng/L Final     BNP   Date/Time Value Ref Range Status   03/20/2024 08:41 PM 2 0 - 99 pg/mL Final     Hemoglobin A1C   Date/Time Value Ref Range Status   02/28/2024 08:41 AM 7.5 (H) see below % Final   11/28/2023 11:03 AM 7.1 (H) see below % Final     VLDL   Date/Time Value Ref Range Status   12/30/2022 11:13 AM 18 0 - 40 mg/dL Final      Last I/O:  I/O last 3 completed shifts:  In: 2855 (24 mL/kg) [I.V.:165 (1.4 mL/kg); Blood:100; NG/GT:1890; IV Piggyback:700]  Out: 2290 (19.3 mL/kg) [Drains:390; Other:1900]  Weight: 118.8 kg     Past Cardiology Tests (Last 3 Years):  EKG:  Electrocardiogram, 12-lead PRN ACS symptoms 03/22/2024      Electrocardiogram, 12-lead PRN ACS symptoms 03/20/2024      Electrocardiogram, 12-lead PRN ACS symptoms 03/20/2024    Echo:  Transthoracic Echo (TTE) Limited 04/10/2024      Transthoracic Echo (TTE) Limited 04/02/2024      Transthoracic Echo (TTE) Limited 03/29/2024      Transthoracic Echo (TTE) Limited 03/22/2024      Transthoracic Echo (TTE) Limited 03/21/2024    Ejection Fractions:  EF   Date/Time Value Ref Range Status   03/29/2024 11:48 AM 57 %      Cath:  No results found for this or any previous visit from the past 1095 days.    Stress Test:  No results found for this or any previous visit from the past 1095 days.    Cardiac Imaging:  No results found for this or any previous visit  from the past 1095 days.      Past Medical History:  He has a past medical history of Diabetes mellitus (Multi), Hyperlipidemia, and Morbid (severe) obesity due to excess calories (Multi) (05/31/2016).    Past Surgical History:  He has a past surgical history that includes Other surgical history (05/31/2016); CT guided percutaneous biopsy muscle (11/13/2023); and Invasive Vascular Procedure (N/A, 3/20/2024).      Social History:  He reports that he has never smoked. He has never used smokeless tobacco. He reports that he does not drink alcohol and does not use drugs.    Family History:  Family History   Problem Relation Name Age of Onset    Coronary artery disease Mother      Diabetes Mother      Hypertension Mother      Cancer Father      Diabetes Sister      Diabetes Brother      Diabetes Other Grandmother         Allergies:  Patient has no known allergies.    Inpatient Medications:  Scheduled medications   Medication Dose Route Frequency    albumin human  25 g intravenous q8h    fludrocortisone  0.1 mg nasogastric tube q12h    heparin (porcine)  5,000 Units subcutaneous q8h    hydrocortisone sodium succinate  50 mg intravenous q8h    insulin glargine  20 Units subcutaneous q12h    insulin lispro  0-20 Units subcutaneous q4h    lidocaine (cardiac)        lidocaine        meropenem  1 g intravenous q12h    midodrine  20 mg orogastric tube q8h    oxyCODONE  5 mg oral q6h    oxygen   inhalation Continuous - Inhalation    pantoprazole  40 mg intravenous Daily     PRN medications   Medication    alteplase    calcium gluconate    calcium gluconate    dextrose    glucagon    HYDROmorphone    lidocaine (cardiac)    lidocaine    magnesium sulfate    magnesium sulfate    sodium chloride     Continuous Medications   Medication Dose Last Rate    lactated Ringer's  5 mL/hr 5 mL/hr (04/14/24 1900)    norepinephrine  0.01-0.5 mcg/kg/min (Dosing Weight) Stopped (04/13/24 1356)     Outpatient Medications:  Current Outpatient  Medications   Medication Instructions    aspirin 81 mg EC tablet 1 tablet, oral, Daily, AS DIRECTED.    atorvastatin (LIPITOR) 40 mg, oral, Daily    blood sugar diagnostic (Blood Glucose Test) strip Daily, One Drop Test In Vitro Strip; Test    chlorhexidine (Peridex) 0.12 % solution 15 mL, Mouth/Throat, See admin instructions, Use the night before and morning of surgery.    dulaglutide (TRULICITY) 4.5 mg/0.5 mL pen injector INJECT ONE PEN (4.5 MG) UNDER THE SKIN ONCE WEEKLY    ergocalciferol (VITAMIN D-2) 50,000 Units, oral, Every 14 days, twice monthly on the 15th and the 30 th for vitamin d deficiency    fish oil concentrate (Omega-3) 120-180 mg capsule 1 g, oral    gabapentin (Neurontin) 300 mg capsule Use the 100 mg capsule to titrate to 300 mg , then use the 300 mg capsule and take 1 to 2 capsules hs for pain    ibuprofen 800 mg, oral, 3 times daily PRN    metFORMIN (Glucophage) 1,000 mg tablet TAKE 1 TABLET BY MOUTH EVERY MORNING AND 1.5 TABLETS BY MOUTH EVERY EVENING ,    multivitamin tablet 1 tablet, oral, Daily    NIFEdipine ER (ADALAT CC) 30 mg, oral, Daily before breakfast, Do not crush, chew, or split.    OneTouch Ultra Test strip Test blood sugar once daily    sildenafil (VIAGRA) 100 mg, oral, Daily PRN    tiZANidine (ZANAFLEX) 4 mg, oral, 3 times daily    Trulicity 4.5 mg, subcutaneous, Once Weekly       Physical Exam:  Vitals reviewed.   Constitutional:       Appearance: He is ill-appearing.   HENT:      Mouth/Throat:      Mouth: Mucous membranes are dry.   Cardiovascular:      Rate and Rhythm: Normal rate.   Abdominal:      General: Bowel sounds are normal. There is distension.   Skin:     General: Skin is dry.   Neurological:      Mental Status: Mental status is at baseline.      Assessment/Plan   nida Hollins is a 63 y.o. 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 On 3/20/2024 he had PEA arrest (unclear what initial rhythm was) with biventricular failure and RV  septal bowing. He got IV tPA for presumed PE with ROSC. Right and left heart cath showed no evidence of PE or obstructive coronary disease. He developed hemorrhagic shock and went back to the OR for wound exploration and hematoma evacuation.   Heparin started 3/23.,Found to have left soleal DVT.      Recommendations  1-continue with the current regimen of heparin for now, continue to check hemoglobin and hematocrit in addition to his platelets if there are no concerns of bleeding from the surgical standpoint then patient can be started on heparin gtt. no bolus target assay of 0.3-0.7 for 48 to 72 hours and continue to monitor the kidney function once patient is approved to receive full dose of heparin by the surgical team.  2-if after 72 hours and kidney function still compromised I would start the patient on Coumadin aiming at a target of 2-3 INR and patient should be on treatment on anticoagulation for at least 6 to 12 months, kidney function is improved and patient is back to normal then a DOAC can be considered.    3-patient was to follow-up with vascular medicine clinic and Coumadin clinic  Peripheral IV 03/25/24 20 G Left;Proximal;Anterior Forearm (Active)   Site Assessment Clean;Dry;Intact 04/15/24 0400   Dressing Type Transparent 04/15/24 0400   Line Status Blood return noted 04/15/24 0400   Dressing Status Clean;Dry;Occlusive 04/15/24 0400   Number of days: 21       Peripheral IV 04/04/24 16 G Right;Anterior Forearm (Active)   Site Assessment Clean;Dry;Intact 04/15/24 0400   Dressing Type Transparent 04/15/24 0400   Line Status No blood return;Flushed;Capped;Saline locked 04/15/24 0400   Dressing Status Clean;Dry;Occlusive 04/15/24 0400   Number of days: 11       Peripheral IV 04/15/24 16 G Left;Upper;Anterior Arm (Active)   Site Assessment Clean;Dry;Intact 04/15/24 0600   Dressing Type Transparent 04/15/24 0600   Line Status Flushed;Capped;Blood return noted;Saline locked 04/15/24 0600   Cap Change Cap  changed 04/15/24 0600   Dressing Status Clean;Dry;Occlusive 04/15/24 0600   Number of days: 0       Surgical Airway Shiley Proximal 6 (Active)   Preferred Form of Communication Face to face;Verbal 04/15/24 0750   Status Secured 04/15/24 0750   Inflation Status Inflated 04/15/24 0750   Site Assessment Clean;Dry 04/15/24 0750   Site Care Dressing applied 04/15/24 0750   Ties Assessment Intact;Secure 04/15/24 0750   Backup Trach at Bedside Yes 04/15/24 0750   Number of days: 14       Arterial Line 04/05/24 Left Radial (Active)   Site Assessment Clean;Dry;Intact 04/15/24 0400   Line Status Pulsatile blood flow 04/15/24 0400   Art Line Waveform Appropriate;Whip 04/15/24 0400   Color/Movement/Sensation Capillary refill less than 3 sec 04/15/24 0400   Dressing Type Antimicrobial patch;Transparent 04/15/24 0400   Dressing Status Clean;Dry;Occlusive 04/15/24 0400   Number of days: 10       NG/OG/Feeding Tube NG - [place] sump Right nostril (Active)   Intake (mL) 45 mL 04/15/24 1000   Number of days: 14       Hemodialysis Cath 03/27/24 Triple lumen Right (Active)   Site Assessment Clean;Dry;Intact 04/15/24 0400   Proximal Lumen Status Capped;Heparin locked 04/15/24 0400   Medial Lumen Status Capped;Flushed 04/15/24 0400   Distal Lumen Status Capped;Heparin locked 04/15/24 0400   Dressing Type Antimicrobial patch;Transparent 04/15/24 0400   Dressing Status Clean;Dry;Occlusive 04/15/24 0400   Number of days: 19       Code Status:  Full Code    I spent 45 minutes in the professional and overall care of this patient.        Sudhakar Johnson MD

## 2024-04-15 NOTE — PROGRESS NOTES
"Jason Hollins is a 63 y.o. male on day 41 of admission presenting with Soft tissue sarcoma (Multi).    Subjective   NAEON       Objective     Physical Exam  Vitals reviewed.   Constitutional:       Appearance: He is ill-appearing.   HENT:      Mouth/Throat:      Mouth: Mucous membranes are dry.   Cardiovascular:      Rate and Rhythm: Normal rate.   Abdominal:      General: Bowel sounds are normal. There is distension.   Skin:     General: Skin is dry.   Neurological:      Mental Status: Mental status is at baseline.         Last Recorded Vitals  Blood pressure 155/82, pulse 70, temperature 36 °C (96.8 °F), temperature source Temporal, resp. rate 24, height 1.778 m (5' 10\"), weight 119 kg (261 lb 14.5 oz), SpO2 96%.  Intake/Output last 3 Shifts:  I/O last 3 completed shifts:  In: 2655 (22.3 mL/kg) [I.V.:165 (1.4 mL/kg); Blood:100; NG/GT:1890; IV Piggyback:500]  Out: 2290 (19.3 mL/kg) [Drains:390; Other:1900]  Weight: 118.8 kg     Relevant Results  Scheduled medications  albumin human, 25 g, intravenous, q8h  fludrocortisone, 0.1 mg, nasogastric tube, q12h  heparin (porcine), 5,000 Units, subcutaneous, q8h  hydrocortisone sodium succinate, 50 mg, intravenous, q8h  insulin glargine, 20 Units, subcutaneous, q12h  insulin lispro, 0-20 Units, subcutaneous, q4h  lidocaine (cardiac), , ,   meropenem, 1 g, intravenous, q12h  midodrine, 20 mg, orogastric tube, q8h  oxyCODONE, 5 mg, oral, q6h  oxygen, , inhalation, Continuous - Inhalation  pantoprazole, 40 mg, intravenous, Daily  simethicone, 40 mg, nasogastric tube, 4x daily      Continuous medications  lactated Ringer's, 5 mL/hr, Last Rate: 5 mL/hr (04/14/24 1900)  norepinephrine, 0.01-0.5 mcg/kg/min (Dosing Weight), Last Rate: Stopped (04/13/24 1356)      PRN medications  PRN medications: alteplase, calcium gluconate, calcium gluconate, dextrose, glucagon, HYDROmorphone, HYDROmorphone, lidocaine (cardiac), magnesium sulfate, magnesium sulfate, sodium " chloride          Assessment/Plan   Principal Problem:    Soft tissue sarcoma (Multi)  Active Problems:    Acute kidney injury (nontraumatic) (CMS-HCC)    Pulmonary embolus (Multi)    Anemia    Thrombocytopenia (CMS-HCC)    Current chronic use of systemic steroids    Gastroesophageal reflux disease without esophagitis    Extraskeletal myxoid chondrosarcoma (Multi)    Cardiopulmonary arrest (Multi)    Acute respiratory failure with hypoxia (Multi)    Tracheostomy hemorrhage (Multi)    Postoperative wound breakdown, initial encounter    Postoperative wound breakdown, sequela    Jason Hollins is a 63 y.o. 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 Hawkins and Dr. Smith on 3/5/24.  Post operative course was uncomplicated and patient was on RNF until 3/20 for prolonged bed rest to avoid hip flexion to maintain flap integrity.  Patient was on prophylactic lovenox while on bedrest.      3/20: Cardiopulmonary arrest s/p CPR with ROSC after TPA, overnight started on heparin, epo, increased pressor requirements, increased swelling at flap site.      3/21: Hemorrhagic shock, MTP. Firm and large right flap site. Return to OR s/p Exploration of right thigh wound, Evacuation of hematoma. Suture ligation of multiple bleeding vessel and several points in gluteal area.      4/1: s/p Trach     4/4: return OR with ENT s/p laryngoscopy, esophagoscopy and bronchoscopy, trach revision. Found extensive clot burden in esophagus and stomach as well as in the lungs. Oozing at thyroid bed cauterized. Trach exchanged to new 6.0 prox XLT shiley. OR findings:  blood up glottis and from thyroid bed to airway.     4/9: Patient is medically stable for OR on 4/11/24.  He is appropriately n.p.o. since midnight and has had more DVT prophylaxis held for OR  today.     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 Hawkins and Dr. Smith on 3/5/24 s/p cardiopulmonary arrest with ROSC 3/20. CT head 3/22 without acute process. Weaned off cisatracurium and propofol overnight 3/23-3/24. Ketamine weaned off 3/25 and Fentanyl weaned off 3/26 am. Repeat CT Head 3/26 without acute process. MRI Brain 3/30, negative for cerebral infarction or hemorrhage. Neuro exam - off sedation, following commands except for RLE, tracking, nodding/shaking head to questions  - Post op pain - Oxycodone 5mg q6h + Dilaudid PRN   - ongoing neuro and pain assessments  - keep off sedation  - PT/OT -> AROM     CV: History of HTN, HLD. Acute cardiopulmonary arrest 2/2 to possible massive PE vs massive STEMI, received multiple rounds of CPR and one defibrillation.  Sustained ROSC achieved after TPA bolus. On high dose levophed, epinephrine, vaso, angioT2. Plan on 3/21 was for ECMO which was aborted secondary to large hemhorrge at right flap site. Had MTP and taken OR with 5L evacuated. ECHO 3/21: Normal LV, Mod enlarged RV, slight suggestion of a Townsend's sign / RV strain, low normal RV function. ECHO 3/22 with hyperdynamic LV. New onset Afib with RVR overnight 3/22-3/23, dosed with 150mg amio x2, started infusion at 1mg/min thereafter. AT2 weaned off. Amio infusion discontinued 3/25. Lactate downtrending. Vaso and epi weaned off. Remains in NSR. iEpo weaned off 3/27. Repeat TTE 3/29 and 4/2 essentially unchanged. Levo resumed 4/2 evening to continue aggressive fluid removal. Repeat TTE on 4/10 with LVEF 65-70% and RV difficulty to assess.  - continuous EKG/abp/cvp monitoring  - Goal map range 65-90  -midodrine to 20 mg 3 times daily  - Continue florinef 0.1mg BID   -Continue stress dose steroids to 50 mg q 8h  - will taper prior to conversion to PO Prednisone  - Off Levo since yesterday 4/13  - Holding heparin infusion for now      PULM: Arrived to SICU intubated julio c-CPR. Concern for massive PE. Started on iEpo, currently on 0.05. Hypoxic respiratory failure. Severe ARDS. ETT exchanged 3/23. CT Chest 3/26 with interval JOHN consolidative/ground glass opacity. S/p Tracheostomy on 4/1. Bedside bronch -> some blood in trach, posterior wall tissue just distal to trach tip appears to be collapsing on cannula.  - SIMV today   - ABGs prn  - additional pulm toilet prn -will discuss regimen with RT  - daily CXR -chest x-ray today indicates similar opacities most prominently in R mid-lower lung fields. Noticeable R sided pleural effusion - similar to prior day     ENT: Had epistaxis 3/23, completed afrin bid x3 days. S/p trach on 4/1. Bleeding from trach site 4/2 am, packing placed by ENT. Repeat episode of bloody tracheal secretions 4/3 through this am. Taken back to OR with ENT 4/4 as per above.  -No bleeding from trach site noted     GI: NPO. Shock liver, pancreatitis. Elevated TG. Elevated lactate. Consulted ACS for potential bowel ischemia as cause of persistent lactic acidosis. CT imaging 3/22 with evidence of pancreatitis. No acute surgical indication per ACS. RUQ US 3/22 with thickening of GB wall without cholelithiasis. CT A/P 3/26 negative for acute bleed. LFTs downtrending. Worsening hyperbilirubinemia. Amylase and lipase now WNL. Repeat RUQ US 4/1 with nonspecific gallbladder wall edema and thickening which may be 2/2 generalized fluid overload, mild hepatomegaly with slight nodular contour and c/f steatosis and fibrosis, irregular hypoechoic region adjacent to hepatic veins. US liver with doppler 4/2 revealed hepatomegaly with nodular contour, mildly elevated RI in main and left hepatic arteries. Hyperbilirubinemia  - Having BM daily  - On Simethicone for gaseous distention on KUB  - prostat daily  - PPI daily for GI  prophy  - Trend LFTs daily -AST/ALT/total bilirubin is approximately similar to prior days.  Per discussion with hepatology yesterday, minor fluctuations are to be expected prior to eventual downtrend     : No history of renal disease. Baseline creatinine 0.6. Anuric RUTH. Elevated CK, downtrending. Metabolic acidosis, total body fluid overload, hyperkalemia. Started on CVVH 3/21, stopped bicarb infusion 3/22. Marino removed 3/24. Hyponatremia resolved. Stopped CVVH 4/2.  Has been tolerating SLED per nephrology recs  - Nephrology following - iHD today  - RFP BID and PRN  - Replete electrolytes judiciously - RFP to be drawn 4 hours after SLED for repletion of electrolytes     HEME: Patient was on ppx lovenox for DVT prophylaxis prior to cardiopulmonary arrest. Concern for massive PE patient received bolus of TPA during CPR. Started on heparin infusion overnight given concern for PE. Hemorrhagic shock 3/21, MTP and back to OR s/p Exploration of right thigh woundEvacuation of hematoma. Suture ligation of multiple bleeding vessel and several points in gluteal area. Hematology consulted 3/22 to r/o HLH. Transfused 1 RBC overnight 3/22-3/23. Thrombocytopenia, coagulapathy, hypofibrinogenemia. LE Duplex 3/25 +acute occlusive R soleal DVT. Received 2u PRBC and 1u FFP on 3/26. Heparin infusion discontinued 3/26 to r/o HIT and transitioned to bival. PF4 negative, resumed heparin infuision 3/27. Elevated IL2R and decreased NK function. C/f HLH (low suspicion), empirically treated with decadron (3/28-3/31). Thrombocytopenia on 3/30 to 18k, received 5pk, did not increment. Heparin held. Thrombocytopenia likely 2/2 to consumptive process, hematology following. Received IVIG and 5pk plts 3/31. Pancytopenia persists, improving thrombocytopenia  - cbc, coags bid and prn  -Hemoglobin stable (prior hemoglobin 10)  -Reengaged vascular medicine re therapeutic systemic anticoagulation recs  - SubQ Heparin restarted on 4/11 post op  -  Hematology following, appreciate recs -> maintain Plt count >35k  - ongoing monitoring for s/s bleeding  - close surveillance of RLE and drains  - Vasc Med signed off 3/27  - maintain active T&S (4/13)     ENDO: History of DM2. A1c 7.5%. TSH WNL 1/2024. Hyperglycemia  - Lantus 20u BID   - q4h accuchecks and SSI #4     ID: Leukocytosis resolved, now with leukopenia. On broad antimicrobial therapy. MRSA screen + 2/28/24. Pan cultures sent 3/22. Sputum NTF, +MRSA screen (completed 5 day course mupirocin), UCx and BCx negative. Completed 7 day course vanc/zosyn 3/27. Febrile to 39.1 4/3, resent blood cultures and BAL and started vanco and zosyn. Switched zosyn to reynaldo, kept on vanco, added john. Blood cultures and sputum with Klebsiella. Repeat blood cultures sent 4/4.  - F/U cultures - Klebsiella grown in wound culture - susceptible to Meropenem per ID  - temp q4h, wbc daily  - Per ID - discussed with pharmacy who recommended 1g BID dosing  - Per ID - continue Meropenem - will determine end date later based on clinical course  - Consider line holiday if HDS after iHD today. Can place additional PIV prior to potential removal of central line   - Ucx shows no growth  - ongoing monitoring for s/s infection  - plastics team monitoring wound site - no increased drainage noted in drains and swelling consistent with prior day. Plastics team is monitoring as well  - If continues to be hypotensive consider broaden abx/ add antifungal     Lines:  - right radial a line (4/5)  - RIJ trialysis (3/27)  - PIV x2     Patient discussed with Dr. Moreno.           Gordon Nieves MD

## 2024-04-16 ENCOUNTER — APPOINTMENT (OUTPATIENT)
Dept: RADIOLOGY | Facility: HOSPITAL | Age: 64
DRG: 003 | End: 2024-04-16
Payer: COMMERCIAL

## 2024-04-16 LAB
ABO GROUP (TYPE) IN BLOOD: NORMAL
ALBUMIN SERPL BCP-MCNC: 3.5 G/DL (ref 3.4–5)
ALP SERPL-CCNC: 157 U/L (ref 33–136)
ALT SERPL W P-5'-P-CCNC: 5 U/L (ref 10–52)
ANION GAP BLDA CALCULATED.4IONS-SCNC: 12 MMO/L (ref 10–25)
ANION GAP SERPL CALC-SCNC: 17 MMOL/L (ref 10–20)
ANTIBODY SCREEN: NORMAL
APTT PPP: 39 SECONDS (ref 27–38)
AST SERPL W P-5'-P-CCNC: 65 U/L (ref 9–39)
BACTERIA SPEC CULT: ABNORMAL
BASE EXCESS BLDA CALC-SCNC: 0.2 MMOL/L (ref -2–3)
BILIRUB DIRECT SERPL-MCNC: 17.7 MG/DL (ref 0–0.3)
BILIRUB SERPL-MCNC: 25.2 MG/DL (ref 0–1.2)
BODY TEMPERATURE: 37 DEGREES CELSIUS
BUN SERPL-MCNC: 54 MG/DL (ref 6–23)
CA-I BLD-SCNC: 1.15 MMOL/L (ref 1.1–1.33)
CA-I BLDA-SCNC: 1.19 MMOL/L (ref 1.1–1.33)
CALCIUM SERPL-MCNC: 8.4 MG/DL (ref 8.6–10.6)
CARBA5 NEG: ABNORMAL
CHLORIDE BLDA-SCNC: 99 MMOL/L (ref 98–107)
CHLORIDE SERPL-SCNC: 97 MMOL/L (ref 98–107)
CO2 SERPL-SCNC: 24 MMOL/L (ref 21–32)
CREAT SERPL-MCNC: 1.9 MG/DL (ref 0.5–1.3)
EGFRCR SERPLBLD CKD-EPI 2021: 39 ML/MIN/1.73M*2
ERYTHROCYTE [DISTWIDTH] IN BLOOD BY AUTOMATED COUNT: 21.4 % (ref 11.5–14.5)
GLUCOSE BLD MANUAL STRIP-MCNC: 185 MG/DL (ref 74–99)
GLUCOSE BLD MANUAL STRIP-MCNC: 204 MG/DL (ref 74–99)
GLUCOSE BLD MANUAL STRIP-MCNC: 217 MG/DL (ref 74–99)
GLUCOSE BLD MANUAL STRIP-MCNC: 224 MG/DL (ref 74–99)
GLUCOSE BLD MANUAL STRIP-MCNC: 256 MG/DL (ref 74–99)
GLUCOSE BLDA-MCNC: 200 MG/DL (ref 74–99)
GLUCOSE SERPL-MCNC: 194 MG/DL (ref 74–99)
GRAM STN SPEC: ABNORMAL
GRAM STN SPEC: ABNORMAL
HCO3 BLDA-SCNC: 25.4 MMOL/L (ref 22–26)
HCT VFR BLD AUTO: 22.5 % (ref 41–52)
HCT VFR BLD EST: 24 % (ref 41–52)
HGB BLD-MCNC: 8.1 G/DL (ref 13.5–17.5)
HGB BLDA-MCNC: 8.1 G/DL (ref 13.5–17.5)
INHALED O2 CONCENTRATION: 40 %
INR PPP: 1.5 (ref 0.9–1.1)
LACTATE BLDA-SCNC: 1.7 MMOL/L (ref 0.4–2)
MAGNESIUM SERPL-MCNC: 2.19 MG/DL (ref 1.6–2.4)
MCH RBC QN AUTO: 28.1 PG (ref 26–34)
MCHC RBC AUTO-ENTMCNC: 36 G/DL (ref 32–36)
MCV RBC AUTO: 78 FL (ref 80–100)
NRBC BLD-RTO: 1.1 /100 WBCS (ref 0–0)
OXYHGB MFR BLDA: 95.5 % (ref 94–98)
PCO2 BLDA: 43 MM HG (ref 38–42)
PH BLDA: 7.38 PH (ref 7.38–7.42)
PHOSPHATE SERPL-MCNC: 3 MG/DL (ref 2.5–4.9)
PLATELET # BLD AUTO: 115 X10*3/UL (ref 150–450)
PO2 BLDA: 100 MM HG (ref 85–95)
POTASSIUM BLDA-SCNC: 4.2 MMOL/L (ref 3.5–5.3)
POTASSIUM SERPL-SCNC: 4.2 MMOL/L (ref 3.5–5.3)
PROT SERPL-MCNC: 5.2 G/DL (ref 6.4–8.2)
PROTHROMBIN TIME: 16.8 SECONDS (ref 9.8–12.8)
RBC # BLD AUTO: 2.88 X10*6/UL (ref 4.5–5.9)
RH FACTOR (ANTIGEN D): NORMAL
SAO2 % BLDA: 99 % (ref 94–100)
SODIUM BLDA-SCNC: 132 MMOL/L (ref 136–145)
SODIUM SERPL-SCNC: 134 MMOL/L (ref 136–145)
UFH PPP CHRO-ACNC: 0.3 IU/ML
WBC # BLD AUTO: 6.6 X10*3/UL (ref 4.4–11.3)

## 2024-04-16 PROCEDURE — 2500000005 HC RX 250 GENERAL PHARMACY W/O HCPCS: Performed by: STUDENT IN AN ORGANIZED HEALTH CARE EDUCATION/TRAINING PROGRAM

## 2024-04-16 PROCEDURE — 99232 SBSQ HOSP IP/OBS MODERATE 35: CPT | Performed by: STUDENT IN AN ORGANIZED HEALTH CARE EDUCATION/TRAINING PROGRAM

## 2024-04-16 PROCEDURE — 97110 THERAPEUTIC EXERCISES: CPT | Mod: GP | Performed by: PHYSICAL THERAPIST

## 2024-04-16 PROCEDURE — 2500000001 HC RX 250 WO HCPCS SELF ADMINISTERED DRUGS (ALT 637 FOR MEDICARE OP): Performed by: STUDENT IN AN ORGANIZED HEALTH CARE EDUCATION/TRAINING PROGRAM

## 2024-04-16 PROCEDURE — 82248 BILIRUBIN DIRECT: CPT | Performed by: STUDENT IN AN ORGANIZED HEALTH CARE EDUCATION/TRAINING PROGRAM

## 2024-04-16 PROCEDURE — 31575 DIAGNOSTIC LARYNGOSCOPY: CPT | Performed by: STUDENT IN AN ORGANIZED HEALTH CARE EDUCATION/TRAINING PROGRAM

## 2024-04-16 PROCEDURE — 51701 INSERT BLADDER CATHETER: CPT

## 2024-04-16 PROCEDURE — 83735 ASSAY OF MAGNESIUM: CPT | Performed by: STUDENT IN AN ORGANIZED HEALTH CARE EDUCATION/TRAINING PROGRAM

## 2024-04-16 PROCEDURE — 84132 ASSAY OF SERUM POTASSIUM: CPT | Performed by: STUDENT IN AN ORGANIZED HEALTH CARE EDUCATION/TRAINING PROGRAM

## 2024-04-16 PROCEDURE — 2500000004 HC RX 250 GENERAL PHARMACY W/ HCPCS (ALT 636 FOR OP/ED): Mod: JZ | Performed by: STUDENT IN AN ORGANIZED HEALTH CARE EDUCATION/TRAINING PROGRAM

## 2024-04-16 PROCEDURE — 85027 COMPLETE CBC AUTOMATED: CPT | Performed by: STUDENT IN AN ORGANIZED HEALTH CARE EDUCATION/TRAINING PROGRAM

## 2024-04-16 PROCEDURE — 71045 X-RAY EXAM CHEST 1 VIEW: CPT | Performed by: RADIOLOGY

## 2024-04-16 PROCEDURE — 82947 ASSAY GLUCOSE BLOOD QUANT: CPT

## 2024-04-16 PROCEDURE — 99291 CRITICAL CARE FIRST HOUR: CPT

## 2024-04-16 PROCEDURE — 86901 BLOOD TYPING SEROLOGIC RH(D): CPT

## 2024-04-16 PROCEDURE — C9113 INJ PANTOPRAZOLE SODIUM, VIA: HCPCS | Performed by: STUDENT IN AN ORGANIZED HEALTH CARE EDUCATION/TRAINING PROGRAM

## 2024-04-16 PROCEDURE — P9047 ALBUMIN (HUMAN), 25%, 50ML: HCPCS | Mod: JZ | Performed by: STUDENT IN AN ORGANIZED HEALTH CARE EDUCATION/TRAINING PROGRAM

## 2024-04-16 PROCEDURE — 2500000002 HC RX 250 W HCPCS SELF ADMINISTERED DRUGS (ALT 637 FOR MEDICARE OP, ALT 636 FOR OP/ED)

## 2024-04-16 PROCEDURE — 37799 UNLISTED PX VASCULAR SURGERY: CPT

## 2024-04-16 PROCEDURE — 2500000004 HC RX 250 GENERAL PHARMACY W/ HCPCS (ALT 636 FOR OP/ED): Performed by: STUDENT IN AN ORGANIZED HEALTH CARE EDUCATION/TRAINING PROGRAM

## 2024-04-16 PROCEDURE — 84100 ASSAY OF PHOSPHORUS: CPT | Performed by: STUDENT IN AN ORGANIZED HEALTH CARE EDUCATION/TRAINING PROGRAM

## 2024-04-16 PROCEDURE — 85610 PROTHROMBIN TIME: CPT | Performed by: STUDENT IN AN ORGANIZED HEALTH CARE EDUCATION/TRAINING PROGRAM

## 2024-04-16 PROCEDURE — 2020000001 HC ICU ROOM DAILY

## 2024-04-16 PROCEDURE — 37799 UNLISTED PX VASCULAR SURGERY: CPT | Performed by: STUDENT IN AN ORGANIZED HEALTH CARE EDUCATION/TRAINING PROGRAM

## 2024-04-16 PROCEDURE — 99024 POSTOP FOLLOW-UP VISIT: CPT | Performed by: PHYSICIAN ASSISTANT

## 2024-04-16 PROCEDURE — 2500000004 HC RX 250 GENERAL PHARMACY W/ HCPCS (ALT 636 FOR OP/ED): Mod: JZ

## 2024-04-16 PROCEDURE — P9045 ALBUMIN (HUMAN), 5%, 250 ML: HCPCS | Mod: JZ

## 2024-04-16 PROCEDURE — 99024 POSTOP FOLLOW-UP VISIT: CPT | Performed by: STUDENT IN AN ORGANIZED HEALTH CARE EDUCATION/TRAINING PROGRAM

## 2024-04-16 PROCEDURE — 85520 HEPARIN ASSAY: CPT

## 2024-04-16 PROCEDURE — 82330 ASSAY OF CALCIUM: CPT | Performed by: STUDENT IN AN ORGANIZED HEALTH CARE EDUCATION/TRAINING PROGRAM

## 2024-04-16 PROCEDURE — 2500000004 HC RX 250 GENERAL PHARMACY W/ HCPCS (ALT 636 FOR OP/ED)

## 2024-04-16 PROCEDURE — 94003 VENT MGMT INPAT SUBQ DAY: CPT

## 2024-04-16 PROCEDURE — 86920 COMPATIBILITY TEST SPIN: CPT

## 2024-04-16 PROCEDURE — 71045 X-RAY EXAM CHEST 1 VIEW: CPT

## 2024-04-16 RX ORDER — ALBUMIN HUMAN 50 G/1000ML
12.5 SOLUTION INTRAVENOUS ONCE
Status: COMPLETED | OUTPATIENT
Start: 2024-04-16 | End: 2024-04-16

## 2024-04-16 RX ORDER — MIDODRINE HYDROCHLORIDE 10 MG/1
10 TABLET ORAL DAILY PRN
Status: DISCONTINUED | OUTPATIENT
Start: 2024-04-16 | End: 2024-04-26

## 2024-04-16 RX ORDER — LABETALOL HYDROCHLORIDE 5 MG/ML
10 INJECTION, SOLUTION INTRAVENOUS EVERY 6 HOURS PRN
Status: DISCONTINUED | OUTPATIENT
Start: 2024-04-16 | End: 2024-04-26

## 2024-04-16 RX ORDER — INSULIN GLARGINE 100 [IU]/ML
25 INJECTION, SOLUTION SUBCUTANEOUS EVERY 12 HOURS
Status: DISCONTINUED | OUTPATIENT
Start: 2024-04-16 | End: 2024-04-18

## 2024-04-16 RX ORDER — MEROPENEM 1 G/1
1 INJECTION, POWDER, FOR SOLUTION INTRAVENOUS EVERY 24 HOURS
Status: DISCONTINUED | OUTPATIENT
Start: 2024-04-17 | End: 2024-04-19

## 2024-04-16 RX ORDER — ALBUMIN HUMAN 50 G/1000ML
SOLUTION INTRAVENOUS
Status: COMPLETED
Start: 2024-04-16 | End: 2024-04-16

## 2024-04-16 RX ORDER — HEPARIN SODIUM 10000 [USP'U]/100ML
0-4500 INJECTION, SOLUTION INTRAVENOUS CONTINUOUS
Status: DISCONTINUED | OUTPATIENT
Start: 2024-04-16 | End: 2024-04-19

## 2024-04-16 RX ADMIN — HEPARIN SODIUM 2000 UNITS/HR: 10000 INJECTION, SOLUTION INTRAVENOUS at 15:28

## 2024-04-16 RX ADMIN — HYDROCORTISONE SODIUM SUCCINATE 50 MG: 100 INJECTION, POWDER, FOR SOLUTION INTRAMUSCULAR; INTRAVENOUS at 11:02

## 2024-04-16 RX ADMIN — Medication: at 20:00

## 2024-04-16 RX ADMIN — INSULIN LISPRO 8 UNITS: 100 INJECTION, SOLUTION INTRAVENOUS; SUBCUTANEOUS at 03:55

## 2024-04-16 RX ADMIN — INSULIN LISPRO 8 UNITS: 100 INJECTION, SOLUTION INTRAVENOUS; SUBCUTANEOUS at 11:58

## 2024-04-16 RX ADMIN — FLUDROCORTISONE ACETATE 0.1 MG: 0.1 TABLET ORAL at 13:33

## 2024-04-16 RX ADMIN — ALBUMIN HUMAN 25 G: 0.25 SOLUTION INTRAVENOUS at 05:01

## 2024-04-16 RX ADMIN — OXYCODONE HYDROCHLORIDE 5 MG: 5 TABLET ORAL at 23:15

## 2024-04-16 RX ADMIN — MEROPENEM 1 G: 1 INJECTION, POWDER, FOR SOLUTION INTRAVENOUS at 01:47

## 2024-04-16 RX ADMIN — INSULIN GLARGINE 25 UNITS: 100 INJECTION, SOLUTION SUBCUTANEOUS at 23:15

## 2024-04-16 RX ADMIN — INSULIN LISPRO 4 UNITS: 100 INJECTION, SOLUTION INTRAVENOUS; SUBCUTANEOUS at 21:02

## 2024-04-16 RX ADMIN — HEPARIN SODIUM 5000 UNITS: 5000 INJECTION INTRAVENOUS; SUBCUTANEOUS at 05:01

## 2024-04-16 RX ADMIN — Medication: at 08:00

## 2024-04-16 RX ADMIN — MEROPENEM 1 G: 1 INJECTION, POWDER, FOR SOLUTION INTRAVENOUS at 14:34

## 2024-04-16 RX ADMIN — OXYCODONE HYDROCHLORIDE 5 MG: 5 TABLET ORAL at 17:45

## 2024-04-16 RX ADMIN — INSULIN LISPRO 12 UNITS: 100 INJECTION, SOLUTION INTRAVENOUS; SUBCUTANEOUS at 08:35

## 2024-04-16 RX ADMIN — ALBUMIN HUMAN 12.5 G: 50 SOLUTION INTRAVENOUS at 05:58

## 2024-04-16 RX ADMIN — PANTOPRAZOLE SODIUM 40 MG: 40 INJECTION, POWDER, FOR SOLUTION INTRAVENOUS at 08:33

## 2024-04-16 RX ADMIN — HEPARIN SODIUM 5000 UNITS: 5000 INJECTION INTRAVENOUS; SUBCUTANEOUS at 13:33

## 2024-04-16 RX ADMIN — INSULIN GLARGINE 20 UNITS: 100 INJECTION, SOLUTION SUBCUTANEOUS at 08:34

## 2024-04-16 RX ADMIN — OXYCODONE HYDROCHLORIDE 5 MG: 5 TABLET ORAL at 05:02

## 2024-04-16 RX ADMIN — HYDROCORTISONE SODIUM SUCCINATE 50 MG: 100 INJECTION, POWDER, FOR SOLUTION INTRAMUSCULAR; INTRAVENOUS at 01:46

## 2024-04-16 RX ADMIN — INSULIN LISPRO 4 UNITS: 100 INJECTION, SOLUTION INTRAVENOUS; SUBCUTANEOUS at 00:13

## 2024-04-16 RX ADMIN — OXYCODONE HYDROCHLORIDE 5 MG: 5 TABLET ORAL at 11:02

## 2024-04-16 RX ADMIN — FLUDROCORTISONE ACETATE 0.1 MG: 0.1 TABLET ORAL at 01:47

## 2024-04-16 RX ADMIN — ALBUMIN HUMAN 25 G: 0.25 SOLUTION INTRAVENOUS at 13:33

## 2024-04-16 RX ADMIN — HEPARIN SODIUM 5000 UNITS: 5000 INJECTION INTRAVENOUS; SUBCUTANEOUS at 00:13

## 2024-04-16 RX ADMIN — HYDROCORTISONE SODIUM SUCCINATE 50 MG: 100 INJECTION, POWDER, FOR SOLUTION INTRAMUSCULAR; INTRAVENOUS at 17:45

## 2024-04-16 RX ADMIN — OXYCODONE HYDROCHLORIDE 5 MG: 5 TABLET ORAL at 00:14

## 2024-04-16 RX ADMIN — INSULIN LISPRO 8 UNITS: 100 INJECTION, SOLUTION INTRAVENOUS; SUBCUTANEOUS at 15:27

## 2024-04-16 RX ADMIN — MIDODRINE HYDROCHLORIDE 20 MG: 10 TABLET ORAL at 11:02

## 2024-04-16 RX ADMIN — ALBUMIN HUMAN 12.5 G: 0.05 INJECTION, SOLUTION INTRAVENOUS at 05:58

## 2024-04-16 RX ADMIN — MIDODRINE HYDROCHLORIDE 20 MG: 10 TABLET ORAL at 01:46

## 2024-04-16 RX ADMIN — MIDODRINE HYDROCHLORIDE 20 MG: 10 TABLET ORAL at 17:45

## 2024-04-16 RX ADMIN — HYDROMORPHONE HYDROCHLORIDE 0.2 MG: 1 INJECTION, SOLUTION INTRAMUSCULAR; INTRAVENOUS; SUBCUTANEOUS at 03:51

## 2024-04-16 ASSESSMENT — PAIN SCALES - GENERAL
PAINLEVEL_OUTOF10: 0 - NO PAIN

## 2024-04-16 ASSESSMENT — PAIN - FUNCTIONAL ASSESSMENT
PAIN_FUNCTIONAL_ASSESSMENT: CPOT (CRITICAL CARE PAIN OBSERVATION TOOL)
PAIN_FUNCTIONAL_ASSESSMENT: CPOT (CRITICAL CARE PAIN OBSERVATION TOOL)
PAIN_FUNCTIONAL_ASSESSMENT: 0-10
PAIN_FUNCTIONAL_ASSESSMENT: 0-10
PAIN_FUNCTIONAL_ASSESSMENT: CPOT (CRITICAL CARE PAIN OBSERVATION TOOL)
PAIN_FUNCTIONAL_ASSESSMENT: 0-10
PAIN_FUNCTIONAL_ASSESSMENT: CPOT (CRITICAL CARE PAIN OBSERVATION TOOL)
PAIN_FUNCTIONAL_ASSESSMENT: CPOT (CRITICAL CARE PAIN OBSERVATION TOOL)
PAIN_FUNCTIONAL_ASSESSMENT: 0-10
PAIN_FUNCTIONAL_ASSESSMENT: CPOT (CRITICAL CARE PAIN OBSERVATION TOOL)

## 2024-04-16 ASSESSMENT — COGNITIVE AND FUNCTIONAL STATUS - GENERAL
CLIMB 3 TO 5 STEPS WITH RAILING: TOTAL
STANDING UP FROM CHAIR USING ARMS: TOTAL
MOBILITY SCORE: 6
TURNING FROM BACK TO SIDE WHILE IN FLAT BAD: TOTAL
MOVING TO AND FROM BED TO CHAIR: TOTAL
MOVING FROM LYING ON BACK TO SITTING ON SIDE OF FLAT BED WITH BEDRAILS: TOTAL
WALKING IN HOSPITAL ROOM: TOTAL

## 2024-04-16 NOTE — PROGRESS NOTES
Physical Therapy    Physical Therapy Treatment    Patient Name: Jason Hollins  MRN: [id number]  Today's Date: 4/16/2024  Time Calculation  Start Time: 1204  Stop Time: 1233  Time Calculation (min): 29 min       Assessment/Plan   PT Assessment  End of Session Communication: Bedside nurse  End of Session Patient Position: Bed, 3 rail up  PT Plan  Inpatient/Swing Bed or Outpatient: Inpatient  PT Plan  Treatment/Interventions: Bed mobility, Transfer training, Balance training, Neuromuscular re-education, Strengthening, Endurance training, Therapeutic exercise, Therapeutic activity  PT Plan: Skilled PT  PT Frequency: 3 times per week  PT Discharge Recommendations: Moderate intensity level of continued care  Equipment Recommended upon Discharge:  (none)  PT Recommended Transfer Status: Total assist  PT - OK to Discharge: Yes      General Visit Information:   PT  Visit  PT Received On: 04/16/24  General  Prior to Session Communication: Bedside nurse  Patient Position Received: Bed, 3 rail up  General Comment: Pt awake and alert though overall lethargic 2/2 recent pain meds. On SIMV on vent, mult drains, tele, IV, a-line. Since previous session, s/p RTOR with plastics. Discussed mobility/ROM with medical team prior to session as pt is documented to have L soleal DVT. OK to continue mobility without restriction per team - pt on prophylactic subcutaneous heparin and risk of immobility is greater than risk of mobilizing.    Subjective   Precautions:  Precautions  LE Weight Bearing Status:  (NWB RLE - no other mobility restrictions following most recent OR 4/11 per plastics team)  Medical Precautions: Fall precautions, Oxygen therapy device and L/min  Vital Signs:  Vital Signs  Heart Rate: 89  Resp: 21  SpO2: 95 %  BP: 168/71  MAP (mmHg): 98    Objective   Pain:  Pain Assessment  Pain Assessment: 0-10  Pain Score: 0 - No pain (recently received pain meds)  Cognition:  Cognition  Overall Cognitive Status: Impaired  Orientation Level:   (Oriented to self)  Following Commands:  (Followed 25% basic motor commands with increased prompting, increased command following on L side)  Cognition Comments: overall lethargic    Activity Tolerance:  Activity Tolerance  Endurance: Tolerates 10 - 20 min exercise with multiple rests  Early Mobility/Exercise Safety Screen: Proceed with mobilization - No exclusion criteria met  Treatments:  Therapeutic Exercise  Therapeutic Exercise Activity 1: Performed supine level ther ex this date including x10 reps , wrist flex/ext, pro/sup, elbow flex/ext, shoulder elevation, DF/PF, heel slides, hip ABD/ADD, L hip ER/IR. Performed to tolerance with VSS, primarily PROM however achieved some muscle contraction with max cues for AAROM    Bed Mobility 1  Bed Mobility Comments 1: Unable to mobilize at this time 2/2 level of dependence and lethargy. Performed bed level ther ex.    Outcome Measures:  [facility] Basic Mobility  Turning from your back to your side while in a flat bed without using bedrails: Total  Moving from lying on your back to sitting on the side of a flat bed without using bedrails: Total  Moving to and from bed to chair (including a wheelchair): Total  Standing up from a chair using your arms (e.g. wheelchair or bedside chair): Total  To walk in hospital room: Total  Climbing 3-5 steps with railing: Total  Basic Mobility - Total Score: 6    FSS-ICU  Ambulation: Unable to attempt due to weakness  Rolling: Total assistance (performs 25% or requires another person)  Sitting: Total assistance (performs 25% or requires another person)  Transfer Sit-to-Stand: Unable to perform  Transfer Supine-to-Sit: Total assistance (performs 25% or requires another person)  Total Score: 3      ICU Mobility Screen  Early Mobility/Exercise Safety Screen: Proceed with mobilization - No exclusion criteria met    Education Documentation  Mobility Training, taught by Yamilex Cast PT at 4/16/2024  3:20 PM.  Learner: Patient  Readiness:  Acceptance  Method: Explanation, Demonstration  Response: Needs Reinforcement, No Evidence of Learning    Education Comments  No comments found.      Encounter Problems       Encounter Problems (Active)       Balance       Pt will demonstrate ability to complete sitting static/dynamic balance activities with, maxAx1, bilateral UE support and no LOB for increase in safety up D/C.  (Progressing)       Start:  04/08/24    Expected End:  04/22/24               Mobility       Pt will demonstrate ability to complete ther ex activities to improve functional strength for increase in independence upon DC.  (Progressing)       Start:  04/08/24    Expected End:  04/22/24            Pt will be able to tolerate >15 minutes of seated activity continuously without seated rest break with stable vitals and RPD </=3/10 and RPE </=13/20 for improved functional mobility  (Progressing)       Start:  04/08/24    Expected End:  04/22/24               PT Transfers       Pt will demonstrated ability to complete bed mobility and sit<>stand transfers with max assistance x2 and use of assistive device to safely. (Progressing)       Start:  04/08/24    Expected End:  04/22/24                 Yamilex Cast PT, DPT

## 2024-04-16 NOTE — PROGRESS NOTES
Jason Hollins is a 63 y.o. male on day 42 of admission presenting with Soft tissue sarcoma (Multi).      Subjective   4/16: S/p HD yesterday 1.8L. Pt seen resting in bed; follow commands.  FiO2 40% to vent via trach. Bladder scan showed 650mL so had 600mL drained via straight cath.    Objective          Vitals 24HR  Heart Rate:  []   Temp:  [35.6 °C (96.1 °F)-37.5 °C (99.5 °F)]   Resp:  [0-30]   BP: (124-177)/()   Weight:  [118 kg (259 lb 4.2 oz)]   SpO2:  [91 %-100 %]         Intake/Output last 3 Shifts:    Intake/Output Summary (Last 24 hours) at 4/16/2024 1443  Last data filed at 4/16/2024 1400  Gross per 24 hour   Intake 3085 ml   Output 2655 ml   Net 430 ml       Physical Exam  General appearance: intubated, trached  Neck: trach  Heart: RRR  Lungs: FiO2 40% on vent via trach  : no Marino  Extremities: no leg edema  Neuro: alert, follows commands  Dialysis ACCESS:  right I J trialysis    Current Facility-Administered Medications:     albumin human 25 % solution 25 g, 25 g, intravenous, q8h, Mariluz Dailey MD, Stopped at 04/16/24 1433    alteplase (Cathflo Activase) injection 2 mg, 2 mg, intra-catheter, PRN, Mariluz Dailey MD    calcium gluconate in NS IV 1 g, 1 g, intravenous, q6h PRN, Mariluz Dailey MD, Stopped at 04/14/24 0458    calcium gluconate in NS IV 2 g, 2 g, intravenous, q6h PRN, Mariluz Dailey MD, Last Rate: 100 mL/hr at 04/04/24 1118, 2 g at 04/04/24 1118    dextrose 50 % injection 12.5 g, 12.5 g, intravenous, q15 min PRN, Mariluz Dailey MD    fludrocortisone (Florinef) tablet 0.1 mg, 0.1 mg, nasogastric tube, q12h, Mariluz Dailey MD, 0.1 mg at 04/16/24 1333    glucagon (Glucagen) injection 1 mg, 1 mg, intramuscular, q15 min PRN, Mariluz Dailey MD    heparin (porcine) injection 5,000 Units, 5,000 Units, subcutaneous, q8h, Mariluz Daliey MD, 5,000 Units at 04/16/24 1333    hydrocortisone sod succ (PF) (Solu-CORTEF) injection 50 mg, 50 mg, intravenous, q8h, Mariluz Dailey MD, 50 mg at 04/16/24  1102    HYDROmorphone (Dilaudid) injection 0.2 mg, 0.2 mg, intravenous, q3h PRN, Mariluz Dailey MD, 0.2 mg at 04/16/24 0351    insulin glargine (Lantus) injection 25 Units, 25 Units, subcutaneous, q12h, RAQUEL Alejo    insulin lispro (HumaLOG) injection 0-20 Units, 0-20 Units, subcutaneous, q4h, Mariluz Dailey MD, 8 Units at 04/16/24 1158    lactated Ringer's infusion, 5 mL/hr, intravenous, Continuous, Mariluz Dailey MD, Last Rate: 5 mL/hr at 04/16/24 1200, 5 mL/hr at 04/16/24 1200    magnesium sulfate IV 2 g, 2 g, intravenous, q6h PRN, Marliuz Dailey MD, Stopped at 03/24/24 1726    magnesium sulfate IV 4 g, 4 g, intravenous, q6h PRN, Mariluz Dailey MD, Stopped at 04/05/24 0838    meropenem (Merrem) in sodium chloride 0.9 % 100 mL IV 1 g, 1 g, intravenous, q12h, Mariluz Dailey MD, Last Rate: 200 mL/hr at 04/16/24 1434, 1 g at 04/16/24 1434    midodrine (Proamatine) tablet 10 mg, 10 mg, oral, Daily PRN, Alesha Mckeon PA-C    midodrine (Proamatine) tablet 20 mg, 20 mg, orogastric tube, q8h, Mariluz Dailey MD, 20 mg at 04/16/24 1102    oxyCODONE (Roxicodone) immediate release tablet 5 mg, 5 mg, oral, q6h, Mariluz Dailey MD, 5 mg at 04/16/24 1102    oxygen (O2) therapy, , inhalation, Continuous - Inhalation, Mariluz Dailey MD, Start at 04/16/24 0800    pantoprazole (ProtoNix) injection 40 mg, 40 mg, intravenous, Daily, Mariluz Dailey MD, 40 mg at 04/16/24 0833    sodium chloride (Ocean) 0.65 % nasal spray 1 spray, 1 spray, Each Nostril, 4x daily PRN, Mariluz Dailey MD      Assessment/Plan   Jason Hollins is a 63 y.o. 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 Hawkins and Dr. Smith on 3/5/24. On 3/20, he developed massive PE and was given TPA, taken to OR in setting of increased swelling at flap site- hematoma  for exploration and evacuation. Nephrology following for RUTH.    #Acute Kidney Injury on Dialysis (RUTH-D), anuric --> oliguric?  - etiology hemodynamic from hemorrhagic shock  - began on CVVH since 3/21 via R I J trialysis and transitioned to SLED on 4/2, then iHD on 4/15  - Cr baseline 0.6  - electrolytes: K 4.2 and HCO3 24 WNL  - bladder scan 650mL; straight cath 600mL; 24h I/Os net +0.6L  - FiO2 40% on - trach  - BP stable - no pressor  - s/p SLED 4/13 during day with 2L fluid removed   - s/p HD 4/15 with 1.8L UF  - CTICU aware of plan to exchange current trialysis for new catheter as current access has been in place >14 days    Myxoid Chondrosarcoma s/p wide resection 3/5/2024    Plan  - no need for dialysis today, will re-evaluate tomorrow  - urine witnessed during bladder scan is encouraging for recovery; continue bladder scan once per shift    D/w Dr. Anatoly Duffy MD  Nephrology Fellow PGY 5  Contact via secure chat  Nephrology Pager # [phone] ([phone])

## 2024-04-16 NOTE — PROGRESS NOTES
"Jason Hollins is a 63 y.o. male on day 42 of admission presenting with Soft tissue sarcoma (Multi).    Subjective   Soft Bps ON, received 500cc 5%Albumin and 100cc 25%Albumin  Still with diarrheal episodes similar to prior days.       Objective     Physical Exam  Vitals reviewed.   HENT:      Mouth/Throat:      Mouth: Mucous membranes are dry.   Cardiovascular:      Rate and Rhythm: Regular rhythm. Tachycardia present.      Pulses: Normal pulses.      Heart sounds: Normal heart sounds.   Pulmonary:      Comments: SIMV via trach tube  Abdominal:      Palpations: Abdomen is soft.   Neurological:      Mental Status: Mental status is at baseline.         Last Recorded Vitals  Blood pressure (!) 166/104, pulse 100, temperature 37 °C (98.6 °F), temperature source Temporal, resp. rate (!) 30, height 1.778 m (5' 10\"), weight 118 kg (259 lb 4.2 oz), SpO2 95%.  Intake/Output last 3 Shifts:  I/O last 3 completed shifts:  In: 4130 (35.1 mL/kg) [I.V.:780 (6.6 mL/kg); Other:400; NG/GT:1550; IV Piggyback:1400]  Out: 2795 (23.8 mL/kg) [Urine:600 (0.1 mL/kg/hr); Drains:395; Other:1800]  Weight: 117.6 kg     Relevant Results  Scheduled medications  albumin human, 25 g, intravenous, q8h  fludrocortisone, 0.1 mg, nasogastric tube, q12h  heparin (porcine), 5,000 Units, subcutaneous, q8h  hydrocortisone sodium succinate, 50 mg, intravenous, q8h  insulin glargine, 20 Units, subcutaneous, q12h  insulin lispro, 0-20 Units, subcutaneous, q4h  meropenem, 1 g, intravenous, q12h  midodrine, 20 mg, orogastric tube, q8h  oxyCODONE, 5 mg, oral, q6h  oxygen, , inhalation, Continuous - Inhalation  pantoprazole, 40 mg, intravenous, Daily      Continuous medications  lactated Ringer's, 5 mL/hr, Last Rate: 5 mL/hr (04/16/24 0400)      PRN medications  PRN medications: alteplase, calcium gluconate, calcium gluconate, dextrose, glucagon, HYDROmorphone, magnesium sulfate, magnesium sulfate, sodium chloride      Assessment/Plan   Principal Problem:    Soft " tissue sarcoma (Multi)  Active Problems:    Acute kidney injury (nontraumatic) (CMS-HCC)    Pulmonary embolus (Multi)    Anemia    Thrombocytopenia (CMS-HCC)    Current chronic use of systemic steroids    Gastroesophageal reflux disease without esophagitis    Extraskeletal myxoid chondrosarcoma (Multi)    Cardiopulmonary arrest (Multi)    Acute respiratory failure with hypoxia (Multi)    Tracheostomy hemorrhage (Multi)    Postoperative wound breakdown, initial encounter    Postoperative wound breakdown, sequela    Assessment:  Jason Hollins is a 63 y.o. 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 Hawkins and Dr. Smith on 3/5/24.  Post operative course was uncomplicated and patient was on RNF until 3/20 for prolonged bed rest to avoid hip flexion to maintain flap integrity.  Patient was on prophylactic lovenox while on bedrest.      3/20: Cardiopulmonary arrest s/p CPR with ROSC after TPA, overnight started on heparin, epo, increased pressor requirements, increased swelling at flap site.      3/21: Hemorrhagic shock, MTP. Firm and large right flap site. Return to OR s/p Exploration of right thigh wound, Evacuation of hematoma. Suture ligation of multiple bleeding vessel and several points in gluteal area.      4/1: s/p Trach     4/4: return OR with ENT s/p laryngoscopy, esophagoscopy and bronchoscopy, trach revision. Found extensive clot burden in esophagus and stomach as well as in the lungs. Oozing at thyroid bed cauterized. Trach exchanged to new 6.0 prox XLT elvie. OR findings:  blood up glottis and from thyroid bed to airway.     4/9: Patient is medically stable for OR on 4/11/24.  He is appropriately n.p.o. since midnight and has had more DVT prophylaxis held for OR today.     Plan:  NEURO: History of myxoid  chondrosarcoma s/p wide resection sarcoma, sciatic nerve neurolysis of right lower extremity with right gluteal reconstruction, pedicle ALT, vastus lateralus flap, pedicle gluteal and perisformis flap with Dr. Hawkins and Dr. Smith on 3/5/24 s/p cardiopulmonary arrest with ROSC 3/20. CT head 3/22 without acute process. Weaned off cisatracurium and propofol overnight 3/23-3/24. Ketamine weaned off 3/25 and Fentanyl weaned off 3/26 am. Repeat CT Head 3/26 without acute process. MRI Brain 3/30, negative for cerebral infarction or hemorrhage. Neuro exam - following commands except for RLE, tracking, nodding/shaking head to questions  - Post op pain - Oxycodone 5mg q6h + Dilaudid PRN   - ongoing neuro and pain assessments  - PT/OT -> AROM     CV: History of HTN, HLD. Acute cardiopulmonary arrest 2/2 to possible massive PE vs massive STEMI, received multiple rounds of CPR and one defibrillation.  Sustained ROSC achieved after TPA bolus. On high dose levophed, epinephrine, vaso, angioT2. Plan on 3/21 was for ECMO which was aborted secondary to large hemhorrge at right flap site. Had MTP and taken OR with 5L evacuated. ECHO 3/21: Normal LV, Mod enlarged RV, slight suggestion of a Townsend's sign / RV strain, low normal RV function. ECHO 3/22 with hyperdynamic LV. New onset Afib with RVR overnight 3/22-3/23, dosed with 150mg amio x2, started infusion at 1mg/min thereafter. AT2 weaned off. Amio infusion discontinued 3/25. Lactate downtrending. Vaso and epi weaned off. Remains in NSR. iEpo weaned off 3/27. Repeat TTE 3/29 and 4/2 essentially unchanged. Levo resumed 4/2 evening to continue aggressive fluid removal. Repeat TTE on 4/10 with LVEF 65-70% and RV difficulty to assess. ON received 500cc of 5% Albumin and 100cc of 25% albumin for soft MAPs  - continuous EKG/abp/cvp monitoring  - Goal map range 65-90  - midodrine to 20 mg 3 times daily  - midodrine PRN for iHD if needed  - Continue florinef 0.1mg BID   -Continue  stress dose steroids to 50 mg q 8h - Day 2  - Off Levo since yesterday 4/13     PULM: Arrived to SICU intubated julio c-CPR. Concern for massive PE. Started on iEpo, currently on 0.05. Hypoxic respiratory failure. Severe ARDS. ETT exchanged 3/23. CT Chest 3/26 with interval JOHN consolidative/ground glass opacity. S/p Tracheostomy on 4/1. Bedside bronch -> some blood in trach, posterior wall tissue just distal to trach tip appears to be collapsing on cannula.   - SIMV   - ABGs prn  - additional pulm toilet prn -will discuss regimen with RT  - daily CXR -chest x-ray today indicates similar opacities most prominently in R mid-lower lung fields. Noticeable R sided pleural effusion - similar to prior day     ENT: Had epistaxis 3/23, completed afrin bid x3 days. S/p trach on 4/1. Bleeding from trach site 4/2 am, packing placed by ENT. Repeat episode of bloody tracheal secretions 4/3 through this am. Taken back to OR with ENT 4/4 as per above.  -Slight bleeding from trach site noted per bedside nurse overnight  -ENT informed, appreciate recs     GI: NPO. Shock liver, pancreatitis. Elevated TG. Elevated lactate. Consulted ACS for potential bowel ischemia as cause of persistent lactic acidosis. CT imaging 3/22 with evidence of pancreatitis. No acute surgical indication per ACS. RUQ US 3/22 with thickening of GB wall without cholelithiasis. CT A/P 3/26 negative for acute bleed. LFTs downtrending. Worsening hyperbilirubinemia. Amylase and lipase now WNL. Repeat RUQ US 4/1 with nonspecific gallbladder wall edema and thickening which may be 2/2 generalized fluid overload, mild hepatomegaly with slight nodular contour and c/f steatosis and fibrosis, irregular hypoechoic region adjacent to hepatic veins. US liver with doppler 4/2 revealed hepatomegaly with nodular contour, mildly elevated RI in main and left hepatic arteries. Hyperbilirubinemia  - Still with diarrheal episodes unchanged from prior  - On Simethicone for gaseous  distention on KUB  - PPI daily for GI prophylaxis  - Trend LFTs daily -AST/ALT/total bilirubin is approximately similar to prior days.  Per discussion with hepatology yesterday, minor fluctuations are to be expected prior to eventual downtrend     : No history of renal disease. Baseline creatinine 0.6. Anuric RUTH. Elevated CK, downtrending. Metabolic acidosis, total body fluid overload, hyperkalemia. Started on CVVH 3/21, stopped bicarb infusion 3/22. Marino removed 3/24. Hyponatremia resolved. Stopped CVVH 4/2.  Has been tolerating iHD. Net +ve 630. Bladder scan last evening with 650cc, straight cath with 600cc output.  - Nephrology following - iHD yesterday - 1.4L  - PRN midodrine if needed with iHD  - RFP BID and PRN  - Replete electrolytes judiciously     HEME: Patient was on ppx lovenox for DVT prophylaxis prior to cardiopulmonary arrest. Concern for massive PE patient received bolus of TPA during CPR. Started on heparin infusion overnight given concern for PE. Hemorrhagic shock 3/21, MTP and back to OR s/p Exploration of right thigh woundEvacuation of hematoma. Suture ligation of multiple bleeding vessel and several points in gluteal area. Hematology consulted 3/22 to r/o HLH. Transfused 1 RBC overnight 3/22-3/23. Thrombocytopenia, coagulapathy, hypofibrinogenemia. LE Duplex 3/25 +acute occlusive R soleal DVT. Received 2u PRBC and 1u FFP on 3/26. Heparin infusion discontinued 3/26 to r/o HIT and transitioned to bival. PF4 negative, resumed heparin infuision 3/27. Elevated IL2R and decreased NK function. C/f HLH (low suspicion), empirically treated with decadron (3/28-3/31). Thrombocytopenia on 3/30 to 18k, received 5pk, did not increment. Heparin held. Thrombocytopenia likely 2/2 to consumptive process, hematology following. Received IVIG and 5pk plts 3/31. Pancytopenia persists, improving thrombocytopenia  - cbc, coags bid and prn  - Will readdress starting heparin gtt after ENT sees patient re trach  bleed  - SubQ Heparin restarted on 4/11 post op  - Hematology following, appreciate recs -> maintain Plt count >35k  - ongoing monitoring for s/s bleeding  - close surveillance of RLE and drains  - maintain active T&S (4/16)     ENDO: History of DM2. A1c 7.5%. TSH WNL 1/2024. Hyperglycemia  - Increased Lantus to 25u BID   - q4h accuchecks and SSI #4     ID: Leukocytosis resolved, now with leukopenia. On broad antimicrobial therapy. MRSA screen + 2/28/24. Pan cultures sent 3/22. Sputum NTF, +MRSA screen (completed 5 day course mupirocin), UCx and BCx negative. Completed 7 day course vanc/zosyn 3/27. Febrile to 39.1 4/3, resent blood cultures and BAL and started vanco and zosyn. Switched zosyn to reynaldo, kept on vanco, added john. Blood cultures and sputum with Klebsiella. Repeat blood cultures sent 4/4.  - F/U cultures - Klebsiella grown in wound culture - susceptible to Meropenem per ID  - temp q4h, wbc daily  - Tissue cultures growing Klebsiella  - Per ID - discussed with pharmacy who recommended 1g BID dosing, continue Meropenem   - ongoing monitoring for s/s infection  - plastics team monitoring wound site - no increased drainage noted in drains and swelling consistent with prior day.   - If continues to be hypotensive consider broaden abx/ consider adding antifungal     Lines:  - right radial a line (4/5)  - PIV x3  - IR consult for tunneled line in preparation for LTACH placement later this week, care coordinator working with patient and family re placement.     Patient discussed with Dr. Ness.           Gordon Nieves MD

## 2024-04-16 NOTE — PROGRESS NOTES
"  Department of Plastic and Reconstructive Surgery  Daily Progress Note    Patient Name: Jason Hollins  MRN: [id number]  Date:  04/16/24     Subjective   Found resting in bed in TSICU, wife at bedside. Following commands and shaking head appropriately. Incisional wound vac dressing intact to R gluteal site, without issue overnight. ANDERSON drain outputs down-trending/stable.     Objective   Vital Signs  /81   Pulse 109   Temp 37 °C (98.6 °F) (Temporal)   Resp (!) 0   Ht 1.778 m (5' 10\")   Wt 118 kg (259 lb 4.2 oz)   SpO2 95%   BMI 37.20 kg/m²      Physical Exam   Constitutional: Alert, lying in pressure relieving bed in ICU, appears critically ill.   ENMT: MMM, dobhoff in R nare.  Cardiovascular: RRR on telemetry monitor.  Respiratory/Thorax: Trach to vent.  Gastrointestinal: Abdomen soft, protuberant.   Musculoskeletal: Able to squeeze bilateral hands, wiggles toes on the left, minimal movement on RLE, effort to move foot observed.   Extremities: Generalized pitting edema. Right thigh edematous, but soft and compressible, no bruising or tissue induration noted. Visible portion of flap recipient site and adjacent anterior R thigh incisions appear stable. Posterior flap incision line and posterior thigh/gluteal region incision dressed with incisional wound vac, maintaining low continuous suction at -75mmHg, no alarms for leak, obstruction or malfunction, expectantly no output in collecting canister. ANDERSON drain x 3 right upper leg with dark serous outputs.  Neurological: Off sedation, alert and able to respond to commands with head nod.   Skin: Edematous, juandice, warm.     Diagnostics   Results for orders placed or performed during the hospital encounter of 03/05/24 (from the past 24 hour(s))   POCT GLUCOSE   Result Value Ref Range    POCT Glucose 224 (H) 74 - 99 mg/dL   POCT GLUCOSE   Result Value Ref Range    POCT Glucose 191 (H) 74 - 99 mg/dL   POCT GLUCOSE   Result Value Ref Range    POCT Glucose 258 (H) 74 - 99 " mg/dL   CALCIUM, IONIZED   Result Value Ref Range    POCT Calcium, Ionized 1.10 1.1 - 1.33 mmol/L   CBC   Result Value Ref Range    WBC 8.5 4.4 - 11.3 x10*3/uL    nRBC 0.8 (H) 0.0 - 0.0 /100 WBCs    RBC 3.17 (L) 4.50 - 5.90 x10*6/uL    Hemoglobin 9.1 (L) 13.5 - 17.5 g/dL    Hematocrit 24.6 (L) 41.0 - 52.0 %    MCV 78 (L) 80 - 100 fL    MCH 28.7 26.0 - 34.0 pg    MCHC 37.0 (H) 32.0 - 36.0 g/dL    RDW 21.1 (H) 11.5 - 14.5 %    Platelets 141 (L) 150 - 450 x10*3/uL   Coagulation Screen   Result Value Ref Range    Protime 16.8 (H) 9.8 - 12.8 seconds    INR 1.5 (H) 0.9 - 1.1    aPTT 37 27 - 38 seconds   Hepatic function panel   Result Value Ref Range    Albumin 3.2 (L) 3.4 - 5.0 g/dL    Bilirubin, Total 25.8 (H) 0.0 - 1.2 mg/dL    Bilirubin, Direct 18.0 (H) 0.0 - 0.3 mg/dL    Alkaline Phosphatase 180 (H) 33 - 136 U/L    ALT 4 (L) 10 - 52 U/L    AST 70 (H) 9 - 39 U/L    Total Protein 5.2 (L) 6.4 - 8.2 g/dL   Magnesium   Result Value Ref Range    Magnesium 2.06 1.60 - 2.40 mg/dL   Basic Metabolic Panel   Result Value Ref Range    Glucose 254 (H) 74 - 99 mg/dL    Sodium 134 (L) 136 - 145 mmol/L    Potassium 3.9 3.5 - 5.3 mmol/L    Chloride 97 (L) 98 - 107 mmol/L    Bicarbonate 25 21 - 32 mmol/L    Anion Gap 16 10 - 20 mmol/L    Urea Nitrogen 51 (H) 6 - 23 mg/dL    Creatinine 1.79 (H) 0.50 - 1.30 mg/dL    eGFR 42 (L) >60 mL/min/1.73m*2    Calcium 8.4 (L) 8.6 - 10.6 mg/dL   Phosphorus   Result Value Ref Range    Phosphorus 2.2 (L) 2.5 - 4.9 mg/dL   Blood Gas Arterial Full Panel   Result Value Ref Range    POCT pH, Arterial 7.47 (H) 7.38 - 7.42 pH    POCT pCO2, Arterial 34 (L) 38 - 42 mm Hg    POCT pO2, Arterial 74 (L) 85 - 95 mm Hg    POCT SO2, Arterial 97 94 - 100 %    POCT Oxy Hemoglobin, Arterial 93.6 (L) 94.0 - 98.0 %    POCT Hematocrit Calculated, Arterial 28.0 (L) 41.0 - 52.0 %    POCT Sodium, Arterial 132 (L) 136 - 145 mmol/L    POCT Potassium, Arterial 3.9 3.5 - 5.3 mmol/L    POCT Chloride, Arterial 98 98 - 107 mmol/L     POCT Ionized Calcium, Arterial 1.14 1.10 - 1.33 mmol/L    POCT Glucose, Arterial 264 (H) 74 - 99 mg/dL    POCT Lactate, Arterial 1.7 0.4 - 2.0 mmol/L    POCT Base Excess, Arterial 1.2 -2.0 - 3.0 mmol/L    POCT HCO3 Calculated, Arterial 24.7 22.0 - 26.0 mmol/L    POCT Hemoglobin, Arterial 9.3 (L) 13.5 - 17.5 g/dL    POCT Anion Gap, Arterial 13 10 - 25 mmo/L    Patient Temperature 37.0 degrees Celsius    FiO2 40 %   POCT GLUCOSE   Result Value Ref Range    POCT Glucose 194 (H) 74 - 99 mg/dL   Blood Gas Arterial Full Panel   Result Value Ref Range    POCT pH, Arterial 7.38 7.38 - 7.42 pH    POCT pCO2, Arterial 43 (H) 38 - 42 mm Hg    POCT pO2, Arterial 100 (H) 85 - 95 mm Hg    POCT SO2, Arterial 99 94 - 100 %    POCT Oxy Hemoglobin, Arterial 95.5 94.0 - 98.0 %    POCT Hematocrit Calculated, Arterial 24.0 (L) 41.0 - 52.0 %    POCT Sodium, Arterial 132 (L) 136 - 145 mmol/L    POCT Potassium, Arterial 4.2 3.5 - 5.3 mmol/L    POCT Chloride, Arterial 99 98 - 107 mmol/L    POCT Ionized Calcium, Arterial 1.19 1.10 - 1.33 mmol/L    POCT Glucose, Arterial 200 (H) 74 - 99 mg/dL    POCT Lactate, Arterial 1.7 0.4 - 2.0 mmol/L    POCT Base Excess, Arterial 0.2 -2.0 - 3.0 mmol/L    POCT HCO3 Calculated, Arterial 25.4 22.0 - 26.0 mmol/L    POCT Hemoglobin, Arterial 8.1 (L) 13.5 - 17.5 g/dL    POCT Anion Gap, Arterial 12 10 - 25 mmo/L    Patient Temperature 37.0 degrees Celsius    FiO2 40 %   CALCIUM, IONIZED   Result Value Ref Range    POCT Calcium, Ionized 1.15 1.1 - 1.33 mmol/L   CBC   Result Value Ref Range    WBC 6.6 4.4 - 11.3 x10*3/uL    nRBC 1.1 (H) 0.0 - 0.0 /100 WBCs    RBC 2.88 (L) 4.50 - 5.90 x10*6/uL    Hemoglobin 8.1 (L) 13.5 - 17.5 g/dL    Hematocrit 22.5 (L) 41.0 - 52.0 %    MCV 78 (L) 80 - 100 fL    MCH 28.1 26.0 - 34.0 pg    MCHC 36.0 32.0 - 36.0 g/dL    RDW 21.4 (H) 11.5 - 14.5 %    Platelets 115 (L) 150 - 450 x10*3/uL   Coagulation Screen   Result Value Ref Range    Protime 16.8 (H) 9.8 - 12.8 seconds    INR 1.5  (H) 0.9 - 1.1    aPTT 39 (H) 27 - 38 seconds   Hepatic function panel   Result Value Ref Range    Albumin 3.5 3.4 - 5.0 g/dL    Bilirubin, Total 25.2 (H) 0.0 - 1.2 mg/dL    Bilirubin, Direct 17.7 (H) 0.0 - 0.3 mg/dL    Alkaline Phosphatase 157 (H) 33 - 136 U/L    ALT 5 (L) 10 - 52 U/L    AST 65 (H) 9 - 39 U/L    Total Protein 5.2 (L) 6.4 - 8.2 g/dL   Magnesium   Result Value Ref Range    Magnesium 2.19 1.60 - 2.40 mg/dL   Basic Metabolic Panel   Result Value Ref Range    Glucose 194 (H) 74 - 99 mg/dL    Sodium 134 (L) 136 - 145 mmol/L    Potassium 4.2 3.5 - 5.3 mmol/L    Chloride 97 (L) 98 - 107 mmol/L    Bicarbonate 24 21 - 32 mmol/L    Anion Gap 17 10 - 20 mmol/L    Urea Nitrogen 54 (H) 6 - 23 mg/dL    Creatinine 1.90 (H) 0.50 - 1.30 mg/dL    eGFR 39 (L) >60 mL/min/1.73m*2    Calcium 8.4 (L) 8.6 - 10.6 mg/dL   Phosphorus   Result Value Ref Range    Phosphorus 3.0 2.5 - 4.9 mg/dL   POCT GLUCOSE   Result Value Ref Range    POCT Glucose 224 (H) 74 - 99 mg/dL   POCT GLUCOSE   Result Value Ref Range    POCT Glucose 256 (H) 74 - 99 mg/dL     XR chest 1 view  Result Date: 4/14/2024  Interpreted By:  Gina Avila [facility], STUDY: XR CHEST 1 VIEW; 4/14/2024 10:08 am   INDICATION: Signs/Symptoms:Pleural effusion.   COMPARISON: 04/13/2024   ACCESSION NUMBER(S): [id number]   ORDERING CLINICIAN: LUIS WRIGHT   FINDINGS: Tracheostomy cannula is stable. Enteric tube is in place with the tip outside the field of view. Right IJ central venous catheter is projecting over upper CC.   Cardiac silhouette size is stable.   Bilateral mid and lower lung opacity, right more than left, unchanged compared to prior study. Increased interstitial markings. No sizable pneumothorax.   No acute osseous abnormality.     1. Bilateral mid and lower lung opacity with blunting of the costophrenic angles which are likely due to combination of effusion, atelectasis and edema. Correlate with concern for superimposed infection. No significant  interval change.       Signed by: Genaro Avila 4/14/2024 10:30 AM Dictation workstation:   [id number]    Current Medications  Scheduled medications  albumin human, 25 g, intravenous, q8h  fludrocortisone, 0.1 mg, nasogastric tube, q12h  heparin (porcine), 5,000 Units, subcutaneous, q8h  hydrocortisone sodium succinate, 50 mg, intravenous, q8h  insulin glargine, 20 Units, subcutaneous, q12h  insulin lispro, 0-20 Units, subcutaneous, q4h  meropenem, 1 g, intravenous, q12h  midodrine, 20 mg, orogastric tube, q8h  oxyCODONE, 5 mg, oral, q6h  oxygen, , inhalation, Continuous - Inhalation  pantoprazole, 40 mg, intravenous, Daily      Continuous medications  lactated Ringer's, 5 mL/hr, Last Rate: 5 mL/hr (04/16/24 0400)      PRN medications  PRN medications: alteplase, calcium gluconate, calcium gluconate, dextrose, glucagon, HYDROmorphone, magnesium sulfate, magnesium sulfate, sodium chloride     Assessment   Jason Hollins 63 y.o. 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 Hawkins and Dr. Smith on 3/5/24. Postoperative course c/b arrest requiring CPR with ROSC after TPA for assumed large PE on 3/20. Increased swelling at flap site appreciated overnight 3/20-3/21 and pt returned to OR for exploration of R flap site, evacuation of hematoma, suture ligation of multiple bleeding vessel sites in gluteal area and wound closure with Dr. Smith on 3/21. Pt has remained in ICU for continued critical care evaluation and management postoperatively. Returned to OR 4/11 with plastic surgery for R posterior thigh/gluteal region I&D with tissue advancement/complex closure and application of incisional wound vac.     Plan/Recommendations  - No additional acute surgical interventions planned from plastic surgery perspective this  admission   - Maintain incisional wound vac to R posterior thigh wound site per plastic surgery x14 (4/25) days post-op         ·  Settings: -75 mmHg low continuous suction        ·  Monitor/record vac canister output [id number]       ·  Notify plastic surgery with any concerns of leak or obstruction  - Continue ANDERSON drain care to x3 drains present at R thigh flap reconstruction site      -> Drain output continues to steadily decrease; can consider potentially removing drain #2           in next few days if output quantities remain low      ·  Strip drain tubing TID and PRN     ·  Monitor and record output q8h      ·  Keep drain sites C/D/I      ·  Notify plastics if ANDERSON drain output exceeds > 100 ml/hr in one drain or > 300 ml/hr collectively     ·  OK for DC with drains in place, can be removed on outpatient follow up with plastics   - Avoid pressure application or positioning onto flap site or incisions at R upper leg   - Recommend patient be positioned off of R side as able to prevent flap compression/wound breakdown   - Continue clinitron fluidized air mattress  - Appreciate remaining supportive care per ICU   - Plastic Surgery will continue to follow     Patient and plan discussed with Dr. Smith.    Gracie Smith PA-C  Plastic and Reconstructive Surgery   Pager #[phone]  Team phones: [phone]

## 2024-04-16 NOTE — CARE PLAN
The clinical goals for the shift include Pt will be HD stable with adequate oxygenation and perfusion, will rest comfortably during the night      Problem: Diabetes  Goal: Achieve decreasing blood glucose levels by end of shift  Outcome: Progressing  Goal: Increase stability of blood glucose readings by end of shift  Outcome: Progressing  Goal: Decrease in ketones present in urine by end of shift  Outcome: Progressing  Goal: Maintain electrolyte levels within acceptable range throughout shift  Outcome: Progressing  Goal: Maintain glucose levels >70mg/dl to <250mg/dl throughout shift  Outcome: Progressing  Goal: No changes in neurological exam by end of shift  Outcome: Progressing  Goal: Learn about and adhere to nutrition recommendations by end of shift  Outcome: Progressing  Goal: Vital signs within normal range for age by end of shift  Outcome: Progressing  Goal: Increase self care and/or family involovement by end of shift  Outcome: Progressing  Goal: Receive DSME education by end of shift  Outcome: Progressing     Problem: Pain  Goal: Takes deep breaths with improved pain control throughout the shift  Outcome: Progressing  Goal: Turns in bed with improved pain control throughout the shift  Outcome: Progressing  Goal: Walks with improved pain control throughout the shift  Outcome: Progressing  Goal: Performs ADL's with improved pain control throughout shift  Outcome: Progressing  Goal: Participates in PT with improved pain control throughout the shift  Outcome: Progressing     Problem: Skin  Goal: Prevent/manage excess moisture  Outcome: Progressing  Goal: Prevent/minimize sheer/friction injuries  Outcome: Progressing  Goal: Promote/optimize nutrition  Outcome: Progressing  Goal: Promote skin healing  Outcome: Progressing

## 2024-04-17 ENCOUNTER — APPOINTMENT (OUTPATIENT)
Dept: RADIOLOGY | Facility: HOSPITAL | Age: 64
DRG: 003 | End: 2024-04-17
Payer: COMMERCIAL

## 2024-04-17 ENCOUNTER — APPOINTMENT (OUTPATIENT)
Dept: HEMATOLOGY/ONCOLOGY | Facility: CLINIC | Age: 64
End: 2024-04-17
Payer: COMMERCIAL

## 2024-04-17 LAB
ALBUMIN SERPL BCP-MCNC: 3.6 G/DL (ref 3.4–5)
ANION GAP SERPL CALC-SCNC: 19 MMOL/L (ref 10–20)
APTT PPP: 106 SECONDS (ref 27–38)
BUN SERPL-MCNC: 79 MG/DL (ref 6–23)
CA-I BLD-SCNC: 1.14 MMOL/L (ref 1.1–1.33)
CALCIUM SERPL-MCNC: 9.1 MG/DL (ref 8.6–10.6)
CHLORIDE SERPL-SCNC: 97 MMOL/L (ref 98–107)
CO2 SERPL-SCNC: 24 MMOL/L (ref 21–32)
CREAT SERPL-MCNC: 2.52 MG/DL (ref 0.5–1.3)
EGFRCR SERPLBLD CKD-EPI 2021: 28 ML/MIN/1.73M*2
ERYTHROCYTE [DISTWIDTH] IN BLOOD BY AUTOMATED COUNT: 21.9 % (ref 11.5–14.5)
GLUCOSE BLD MANUAL STRIP-MCNC: 165 MG/DL (ref 74–99)
GLUCOSE BLD MANUAL STRIP-MCNC: 173 MG/DL (ref 74–99)
GLUCOSE BLD MANUAL STRIP-MCNC: 190 MG/DL (ref 74–99)
GLUCOSE BLD MANUAL STRIP-MCNC: 200 MG/DL (ref 74–99)
GLUCOSE BLD MANUAL STRIP-MCNC: 218 MG/DL (ref 74–99)
GLUCOSE BLD MANUAL STRIP-MCNC: 239 MG/DL (ref 74–99)
GLUCOSE SERPL-MCNC: 227 MG/DL (ref 74–99)
HCT VFR BLD AUTO: 22.6 % (ref 41–52)
HGB BLD-MCNC: 8.2 G/DL (ref 13.5–17.5)
INR PPP: 1.4 (ref 0.9–1.1)
MAGNESIUM SERPL-MCNC: 2.28 MG/DL (ref 1.6–2.4)
MCH RBC QN AUTO: 28.4 PG (ref 26–34)
MCHC RBC AUTO-ENTMCNC: 36.3 G/DL (ref 32–36)
MCV RBC AUTO: 78 FL (ref 80–100)
NRBC BLD-RTO: 0.6 /100 WBCS (ref 0–0)
PHOSPHATE SERPL-MCNC: 3.5 MG/DL (ref 2.5–4.9)
PLATELET # BLD AUTO: 148 X10*3/UL (ref 150–450)
POTASSIUM SERPL-SCNC: 4.3 MMOL/L (ref 3.5–5.3)
PROTHROMBIN TIME: 16.2 SECONDS (ref 9.8–12.8)
RBC # BLD AUTO: 2.89 X10*6/UL (ref 4.5–5.9)
SODIUM SERPL-SCNC: 136 MMOL/L (ref 136–145)
UFH PPP CHRO-ACNC: 0.4 IU/ML
WBC # BLD AUTO: 8.2 X10*3/UL (ref 4.4–11.3)

## 2024-04-17 PROCEDURE — 71045 X-RAY EXAM CHEST 1 VIEW: CPT | Performed by: RADIOLOGY

## 2024-04-17 PROCEDURE — 99232 SBSQ HOSP IP/OBS MODERATE 35: CPT | Performed by: STUDENT IN AN ORGANIZED HEALTH CARE EDUCATION/TRAINING PROGRAM

## 2024-04-17 PROCEDURE — 85610 PROTHROMBIN TIME: CPT | Performed by: STUDENT IN AN ORGANIZED HEALTH CARE EDUCATION/TRAINING PROGRAM

## 2024-04-17 PROCEDURE — 71045 X-RAY EXAM CHEST 1 VIEW: CPT

## 2024-04-17 PROCEDURE — 2500000004 HC RX 250 GENERAL PHARMACY W/ HCPCS (ALT 636 FOR OP/ED)

## 2024-04-17 PROCEDURE — 2500000005 HC RX 250 GENERAL PHARMACY W/O HCPCS: Performed by: STUDENT IN AN ORGANIZED HEALTH CARE EDUCATION/TRAINING PROGRAM

## 2024-04-17 PROCEDURE — 2500000001 HC RX 250 WO HCPCS SELF ADMINISTERED DRUGS (ALT 637 FOR MEDICARE OP): Performed by: PHYSICIAN ASSISTANT

## 2024-04-17 PROCEDURE — 2020000001 HC ICU ROOM DAILY

## 2024-04-17 PROCEDURE — 82248 BILIRUBIN DIRECT: CPT | Performed by: STUDENT IN AN ORGANIZED HEALTH CARE EDUCATION/TRAINING PROGRAM

## 2024-04-17 PROCEDURE — 80053 COMPREHEN METABOLIC PANEL: CPT | Performed by: STUDENT IN AN ORGANIZED HEALTH CARE EDUCATION/TRAINING PROGRAM

## 2024-04-17 PROCEDURE — 2500000001 HC RX 250 WO HCPCS SELF ADMINISTERED DRUGS (ALT 637 FOR MEDICARE OP): Performed by: STUDENT IN AN ORGANIZED HEALTH CARE EDUCATION/TRAINING PROGRAM

## 2024-04-17 PROCEDURE — C9113 INJ PANTOPRAZOLE SODIUM, VIA: HCPCS | Performed by: STUDENT IN AN ORGANIZED HEALTH CARE EDUCATION/TRAINING PROGRAM

## 2024-04-17 PROCEDURE — 2500000004 HC RX 250 GENERAL PHARMACY W/ HCPCS (ALT 636 FOR OP/ED): Performed by: STUDENT IN AN ORGANIZED HEALTH CARE EDUCATION/TRAINING PROGRAM

## 2024-04-17 PROCEDURE — 99232 SBSQ HOSP IP/OBS MODERATE 35: CPT | Performed by: PHYSICIAN ASSISTANT

## 2024-04-17 PROCEDURE — 82330 ASSAY OF CALCIUM: CPT | Performed by: STUDENT IN AN ORGANIZED HEALTH CARE EDUCATION/TRAINING PROGRAM

## 2024-04-17 PROCEDURE — 85027 COMPLETE CBC AUTOMATED: CPT | Performed by: STUDENT IN AN ORGANIZED HEALTH CARE EDUCATION/TRAINING PROGRAM

## 2024-04-17 PROCEDURE — 84100 ASSAY OF PHOSPHORUS: CPT | Performed by: STUDENT IN AN ORGANIZED HEALTH CARE EDUCATION/TRAINING PROGRAM

## 2024-04-17 PROCEDURE — 85027 COMPLETE CBC AUTOMATED: CPT

## 2024-04-17 PROCEDURE — 83735 ASSAY OF MAGNESIUM: CPT | Performed by: STUDENT IN AN ORGANIZED HEALTH CARE EDUCATION/TRAINING PROGRAM

## 2024-04-17 PROCEDURE — 37799 UNLISTED PX VASCULAR SURGERY: CPT | Performed by: STUDENT IN AN ORGANIZED HEALTH CARE EDUCATION/TRAINING PROGRAM

## 2024-04-17 PROCEDURE — 2500000004 HC RX 250 GENERAL PHARMACY W/ HCPCS (ALT 636 FOR OP/ED): Mod: JZ | Performed by: PHYSICIAN ASSISTANT

## 2024-04-17 PROCEDURE — 85384 FIBRINOGEN ACTIVITY: CPT | Performed by: STUDENT IN AN ORGANIZED HEALTH CARE EDUCATION/TRAINING PROGRAM

## 2024-04-17 PROCEDURE — 99291 CRITICAL CARE FIRST HOUR: CPT

## 2024-04-17 PROCEDURE — 85520 HEPARIN ASSAY: CPT

## 2024-04-17 PROCEDURE — 2500000002 HC RX 250 W HCPCS SELF ADMINISTERED DRUGS (ALT 637 FOR MEDICARE OP, ALT 636 FOR OP/ED)

## 2024-04-17 PROCEDURE — 80048 BASIC METABOLIC PNL TOTAL CA: CPT | Mod: CCI | Performed by: STUDENT IN AN ORGANIZED HEALTH CARE EDUCATION/TRAINING PROGRAM

## 2024-04-17 PROCEDURE — 82947 ASSAY GLUCOSE BLOOD QUANT: CPT

## 2024-04-17 PROCEDURE — 94003 VENT MGMT INPAT SUBQ DAY: CPT

## 2024-04-17 PROCEDURE — P9047 ALBUMIN (HUMAN), 25%, 50ML: HCPCS | Mod: JZ | Performed by: PHYSICIAN ASSISTANT

## 2024-04-17 RX ORDER — FUROSEMIDE 10 MG/ML
80 INJECTION INTRAMUSCULAR; INTRAVENOUS ONCE
Status: COMPLETED | OUTPATIENT
Start: 2024-04-17 | End: 2024-04-17

## 2024-04-17 RX ORDER — OXYCODONE HYDROCHLORIDE 5 MG/1
10 TABLET ORAL EVERY 4 HOURS PRN
Status: DISCONTINUED | OUTPATIENT
Start: 2024-04-17 | End: 2024-04-20

## 2024-04-17 RX ORDER — OXYCODONE HYDROCHLORIDE 5 MG/1
5 TABLET ORAL EVERY 4 HOURS PRN
Status: DISCONTINUED | OUTPATIENT
Start: 2024-04-17 | End: 2024-04-20

## 2024-04-17 RX ORDER — ACETAMINOPHEN 160 MG/5ML
650 SOLUTION ORAL EVERY 6 HOURS PRN
Status: DISCONTINUED | OUTPATIENT
Start: 2024-04-17 | End: 2024-04-26

## 2024-04-17 RX ORDER — ALBUMIN HUMAN 250 G/1000ML
25 SOLUTION INTRAVENOUS EVERY 8 HOURS
Status: DISCONTINUED | OUTPATIENT
Start: 2024-04-17 | End: 2024-04-18

## 2024-04-17 RX ORDER — MIDODRINE HYDROCHLORIDE 10 MG/1
10 TABLET ORAL EVERY 8 HOURS
Status: DISCONTINUED | OUTPATIENT
Start: 2024-04-17 | End: 2024-04-20

## 2024-04-17 RX ADMIN — Medication 40 PERCENT: at 08:00

## 2024-04-17 RX ADMIN — FLUDROCORTISONE ACETATE 0.1 MG: 0.1 TABLET ORAL at 12:29

## 2024-04-17 RX ADMIN — HEPARIN SODIUM 2000 UNITS/HR: 10000 INJECTION, SOLUTION INTRAVENOUS at 19:31

## 2024-04-17 RX ADMIN — FUROSEMIDE 80 MG: 10 INJECTION, SOLUTION INTRAVENOUS at 11:54

## 2024-04-17 RX ADMIN — HYDROCORTISONE SODIUM SUCCINATE 50 MG: 100 INJECTION, POWDER, FOR SOLUTION INTRAMUSCULAR; INTRAVENOUS at 09:45

## 2024-04-17 RX ADMIN — INSULIN GLARGINE 25 UNITS: 100 INJECTION, SOLUTION SUBCUTANEOUS at 09:29

## 2024-04-17 RX ADMIN — OXYCODONE HYDROCHLORIDE 5 MG: 5 TABLET ORAL at 04:32

## 2024-04-17 RX ADMIN — ALBUMIN HUMAN 25 G: 0.25 SOLUTION INTRAVENOUS at 12:30

## 2024-04-17 RX ADMIN — INSULIN LISPRO 8 UNITS: 100 INJECTION, SOLUTION INTRAVENOUS; SUBCUTANEOUS at 20:18

## 2024-04-17 RX ADMIN — INSULIN LISPRO 4 UNITS: 100 INJECTION, SOLUTION INTRAVENOUS; SUBCUTANEOUS at 12:28

## 2024-04-17 RX ADMIN — INSULIN LISPRO 4 UNITS: 100 INJECTION, SOLUTION INTRAVENOUS; SUBCUTANEOUS at 08:46

## 2024-04-17 RX ADMIN — INSULIN GLARGINE 25 UNITS: 100 INJECTION, SOLUTION SUBCUTANEOUS at 21:00

## 2024-04-17 RX ADMIN — INSULIN LISPRO 8 UNITS: 100 INJECTION, SOLUTION INTRAVENOUS; SUBCUTANEOUS at 00:55

## 2024-04-17 RX ADMIN — Medication: at 20:00

## 2024-04-17 RX ADMIN — FUROSEMIDE 80 MG: 10 INJECTION, SOLUTION INTRAVENOUS at 20:17

## 2024-04-17 RX ADMIN — INSULIN LISPRO 4 UNITS: 100 INJECTION, SOLUTION INTRAVENOUS; SUBCUTANEOUS at 04:34

## 2024-04-17 RX ADMIN — PANTOPRAZOLE SODIUM 40 MG: 40 INJECTION, POWDER, FOR SOLUTION INTRAVENOUS at 09:32

## 2024-04-17 RX ADMIN — Medication 1 G: at 20:18

## 2024-04-17 RX ADMIN — HYDROCORTISONE SODIUM SUCCINATE 50 MG: 100 INJECTION, POWDER, FOR SOLUTION INTRAMUSCULAR; INTRAVENOUS at 04:33

## 2024-04-17 RX ADMIN — MIDODRINE HYDROCHLORIDE 10 MG: 10 TABLET ORAL at 18:14

## 2024-04-17 RX ADMIN — MIDODRINE HYDROCHLORIDE 20 MG: 10 TABLET ORAL at 09:33

## 2024-04-17 RX ADMIN — INSULIN LISPRO 4 UNITS: 100 INJECTION, SOLUTION INTRAVENOUS; SUBCUTANEOUS at 16:41

## 2024-04-17 RX ADMIN — HEPARIN SODIUM 2000 UNITS/HR: 10000 INJECTION, SOLUTION INTRAVENOUS at 05:44

## 2024-04-17 RX ADMIN — FLUDROCORTISONE ACETATE 0.1 MG: 0.1 TABLET ORAL at 01:06

## 2024-04-17 RX ADMIN — HYDROCORTISONE SODIUM SUCCINATE 50 MG: 100 INJECTION, POWDER, FOR SOLUTION INTRAMUSCULAR; INTRAVENOUS at 18:13

## 2024-04-17 ASSESSMENT — COGNITIVE AND FUNCTIONAL STATUS - GENERAL
DAILY ACTIVITIY SCORE: 6
STANDING UP FROM CHAIR USING ARMS: TOTAL
TURNING FROM BACK TO SIDE WHILE IN FLAT BAD: TOTAL
PERSONAL GROOMING: TOTAL
WALKING IN HOSPITAL ROOM: TOTAL
HELP NEEDED FOR BATHING: TOTAL
DRESSING REGULAR LOWER BODY CLOTHING: TOTAL
MOVING FROM LYING ON BACK TO SITTING ON SIDE OF FLAT BED WITH BEDRAILS: TOTAL
EATING MEALS: TOTAL
MOVING TO AND FROM BED TO CHAIR: TOTAL
TOILETING: TOTAL
CLIMB 3 TO 5 STEPS WITH RAILING: TOTAL
DRESSING REGULAR UPPER BODY CLOTHING: TOTAL
MOBILITY SCORE: 6

## 2024-04-17 ASSESSMENT — PAIN - FUNCTIONAL ASSESSMENT
PAIN_FUNCTIONAL_ASSESSMENT: CPOT (CRITICAL CARE PAIN OBSERVATION TOOL)
PAIN_FUNCTIONAL_ASSESSMENT: 0-10
PAIN_FUNCTIONAL_ASSESSMENT: CPOT (CRITICAL CARE PAIN OBSERVATION TOOL)
PAIN_FUNCTIONAL_ASSESSMENT: CPOT (CRITICAL CARE PAIN OBSERVATION TOOL)
PAIN_FUNCTIONAL_ASSESSMENT: 0-10

## 2024-04-17 ASSESSMENT — PAIN SCALES - GENERAL
PAINLEVEL_OUTOF10: 0 - NO PAIN

## 2024-04-17 NOTE — CARE PLAN
The patient's goals for the shift include  HANNAH d/t tracheostomy    The clinical goals for the shift include patient will be hemodynamicallty stable throughout the shift    Over the shift, the patient did not make progress toward the following goals. Barriers to progression include . Recommendations to address these barriers include .      Problem: Diabetes  Goal: Achieve decreasing blood glucose levels by end of shift  4/17/2024 0335 by Nevin Maldonado RN  Outcome: Progressing  4/17/2024 0334 by Nevin Maldonado RN  Outcome: Progressing  Goal: Increase stability of blood glucose readings by end of shift  4/17/2024 0335 by Nevin Maldonado RN  Outcome: Progressing  4/17/2024 0334 by Nevin Maldonado RN  Outcome: Progressing  Goal: Decrease in ketones present in urine by end of shift  4/17/2024 0335 by Nevin Maldonado RN  Outcome: Progressing  4/17/2024 0334 by Nevin Maldonado RN  Outcome: Progressing  Goal: Maintain electrolyte levels within acceptable range throughout shift  4/17/2024 0335 by Nevin Maldonado RN  Outcome: Progressing  4/17/2024 0334 by Nevin Malodnado RN  Outcome: Progressing  Goal: Maintain glucose levels >70mg/dl to <250mg/dl throughout shift  4/17/2024 0335 by Nevin Maldonado RN  Outcome: Progressing  4/17/2024 0334 by Nevin Maldonado RN  Outcome: Progressing  Goal: No changes in neurological exam by end of shift  4/17/2024 0335 by Nevin Maldonado RN  Outcome: Progressing  4/17/2024 0334 by Nevin Maldonado RN  Outcome: Progressing  Goal: Learn about and adhere to nutrition recommendations by end of shift  4/17/2024 0335 by Nevin Maldonado RN  Outcome: Progressing  4/17/2024 0334 by Nevin Maldonado RN  Outcome: Progressing  Goal: Vital signs within normal range for age by end of shift  4/17/2024 0335 by Nevin Maldonado RN  Outcome: Progressing  4/17/2024 0334 by Nevin Maldonado RN  Outcome: Progressing  Goal: Increase self care and/or family involovement by end of shift  4/17/2024 0335 by Nevin Maldonado RN  Outcome: Progressing  4/17/2024 0334 by  Nevin Maldonado RN  Outcome: Progressing  Goal: Receive DSME education by end of shift  4/17/2024 0335 by Nevin Maldonado RN  Outcome: Progressing  4/17/2024 0334 by Nevin Maldonado RN  Outcome: Progressing     Problem: Pain  Goal: Takes deep breaths with improved pain control throughout the shift  4/17/2024 0335 by Nevin Maldonado RN  Outcome: Progressing  4/17/2024 0334 by Nevin Maldonado RN  Outcome: Progressing  Goal: Turns in bed with improved pain control throughout the shift  4/17/2024 0335 by Nevin Maldonado RN  Outcome: Progressing  4/17/2024 0334 by Nevin Maldonado RN  Outcome: Progressing  Goal: Walks with improved pain control throughout the shift  4/17/2024 0335 by Nevin Maldonado RN  Outcome: Progressing  4/17/2024 0334 by Nevin Maldonado RN  Outcome: Progressing  Goal: Performs ADL's with improved pain control throughout shift  4/17/2024 0335 by Nevin Maldonado RN  Outcome: Progressing  4/17/2024 0334 by Nevin Maldonado RN  Outcome: Progressing  Goal: Participates in PT with improved pain control throughout the shift  4/17/2024 0335 by Nevin Maldonado RN  Outcome: Progressing  4/17/2024 0334 by Nevin Maldonado RN  Outcome: Progressing     Problem: Skin  Goal: Prevent/manage excess moisture  4/17/2024 0335 by Nevin Maldonado RN  Outcome: Progressing  Flowsheets (Taken 4/17/2024 0335)  Prevent/manage excess moisture:   Moisturize dry skin   Follow provider orders for dressing changes   Monitor for/manage infection if present  4/17/2024 0334 by Nevin Maldonado RN  Outcome: Progressing  Goal: Prevent/minimize sheer/friction injuries  4/17/2024 0335 by Nevin Maldonado RN  Outcome: Progressing  Flowsheets (Taken 4/17/2024 0335)  Prevent/minimize sheer/friction injuries:   Complete micro-shifts as needed if patient unable. Adjust patient position to relieve pressure points, not a full turn   Increase activity/out of bed for meals   Use pull sheet   HOB 30 degrees or less   Turn/reposition every 2 hours/use positioning/transfer devices   Utilize specialty  bed per algorithm  4/17/2024 0334 by Nevin Maldonado RN  Outcome: Progressing  Goal: Promote/optimize nutrition  4/17/2024 0335 by Nevin Maldonado RN  Outcome: Progressing  Flowsheets (Taken 4/17/2024 0335)  Promote/optimize nutrition:   Assist with feeding   Consume > 50% meals/supplements   Offer water/supplements/favorite foods   Discuss with provider if NPO > 2 days  4/17/2024 0334 by Nevin Maldonado RN  Outcome: Progressing  Goal: Promote skin healing  4/17/2024 0335 by Nevin Maldonado RN  Outcome: Progressing  Flowsheets (Taken 4/17/2024 0335)  Promote skin healing:   Assess skin/pad under line(s)/device(s)   Protective dressings over bony prominences   Turn/reposition every 2 hours/use positioning/transfer devices   Rotate device position/do not position patient on device  4/17/2024 0334 by Nevin Maldonado RN  Outcome: Progressing     Problem: Pain - Adult  Goal: Verbalizes/displays adequate comfort level or baseline comfort level  Outcome: Progressing     Problem: Safety - Adult  Goal: Free from fall injury  Outcome: Progressing     Problem: Discharge Planning  Goal: Discharge to home or other facility with appropriate resources  Outcome: Progressing     Problem: Chronic Conditions and Co-morbidities  Goal: Patient's chronic conditions and co-morbidity symptoms are monitored and maintained or improved  Outcome: Progressing     Problem: Knowledge Deficit  Goal: Patient/family/caregiver demonstrates understanding of disease process, treatment plan, medications, and discharge instructions  Outcome: Progressing     Problem: Mechanical Ventilation  Goal: Patient Will Maintain Patent Airway  Outcome: Progressing  Goal: Oral health is maintained or improved  Outcome: Progressing  Goal: Tracheostomy will be managed safely  Outcome: Progressing  Goal: ET tube will be managed safely  Outcome: Progressing  Goal: Ability to express needs and understand communication  Outcome: Progressing  Goal: Mobility/activity is maintained at  optimum level for patient  Outcome: Progressing

## 2024-04-17 NOTE — PROGRESS NOTES
Jason Hollins is a 63 y.o. male on day 43 of admission presenting with Soft tissue sarcoma (Multi).      Subjective   4/17: Pt seen resting in bed. Niece at bedside. He follows commands. FiO2 40% to vent via trach. BP good. UO 230mL in 24h. No longer has trialysis access.    Objective          Vitals 24HR  Heart Rate:  []   Temp:  [36.4 °C (97.5 °F)-37.1 °C (98.7 °F)]   Resp:  [0-34]   BP: (157-194)/(72-92)   Weight:  [119 kg (262 lb 12.6 oz)]   SpO2:  [92 %-98 %]         Intake/Output last 3 Shifts:    Intake/Output Summary (Last 24 hours) at 4/17/2024 1606  Last data filed at 4/17/2024 1500  Gross per 24 hour   Intake 1958.4 ml   Output 836 ml   Net 1122.4 ml       Physical Exam  General appearance: intubated, trached  Neck: trach  Heart: RRR  Lungs: FiO2 40% on vent via trach  : no Marino  Extremities: 1+ b/l leg edema  Neuro: alert, follows commands  Dialysis ACCESS:  none    Current Facility-Administered Medications:     acetaminophen (Tylenol) oral liquid 650 mg, 650 mg, nasogastric tube, q6h PRN, Alesha Mckeon PA-C    albumin human 25 % solution 25 g, 25 g, intravenous, q8h, Alesha Mckeon PA-C, Stopped at 04/17/24 1330    alteplase (Cathflo Activase) injection 2 mg, 2 mg, intra-catheter, PRN, Mariluz Dailey MD    calcium gluconate in NS IV 1 g, 1 g, intravenous, q6h PRN, Mariluz Dailey MD, Stopped at 04/14/24 0458    calcium gluconate in NS IV 2 g, 2 g, intravenous, q6h PRN, Mariluz Dailey MD, Last Rate: 100 mL/hr at 04/04/24 1118, 2 g at 04/04/24 1118    dextrose 50 % injection 12.5 g, 12.5 g, intravenous, q15 min PRN, Mariluz Dailey MD    fludrocortisone (Florinef) tablet 0.1 mg, 0.1 mg, nasogastric tube, q12h, Mariluz Dailey MD, 0.1 mg at 04/17/24 1229    glucagon (Glucagen) injection 1 mg, 1 mg, intramuscular, q15 min PRN, Mariluz Dailey MD    heparin 25,000 Units in dextrose 5% 250 mL (100 Units/mL) infusion (premix), 0-4,500 Units/hr, intravenous, Continuous, Gordon Nieves MD, Last Rate: 20 mL/hr at  04/17/24 0544, 2,000 Units/hr at 04/17/24 0544    hydrocortisone sod succ (PF) (Solu-CORTEF) injection 50 mg, 50 mg, intravenous, q8h, Mariluz Dailey MD, 50 mg at 04/17/24 0945    insulin glargine (Lantus) injection 25 Units, 25 Units, subcutaneous, q12h, PHILLIP Alejo-CNP, 25 Units at 04/17/24 0929    insulin lispro (HumaLOG) injection 0-20 Units, 0-20 Units, subcutaneous, q4h, Mariluz Dailey MD, 4 Units at 04/17/24 1228    labetaloL (Normodyne,Trandate) injection 10 mg, 10 mg, intravenous, q6h PRN, Mariluz Dailey MD    lactated Ringer's infusion, 5 mL/hr, intravenous, Continuous, Mariluz Dailey MD, Last Rate: 5 mL/hr at 04/16/24 1200, 5 mL/hr at 04/16/24 1200    magnesium sulfate IV 2 g, 2 g, intravenous, q6h PRN, Mariluz Dailey MD, Stopped at 03/24/24 1726    magnesium sulfate IV 4 g, 4 g, intravenous, q6h PRN, Mariluz Dailey MD, Stopped at 04/05/24 0838    meropenem (Merrem) in sodium chloride 0.9 % 100 mL IV 1 g, 1 g, intravenous, q24h, Gordon Nieves MD    midodrine (Proamatine) tablet 10 mg, 10 mg, oral, Daily PRN, Alesha Mckeon PA-C    midodrine (Proamatine) tablet 10 mg, 10 mg, orogastric tube, q8h, Alesha Mckeon PA-C    oxyCODONE (Roxicodone) immediate release tablet 10 mg, 10 mg, nasogastric tube, q4h PRN, Alesha Mckeon PA-C    oxyCODONE (Roxicodone) immediate release tablet 5 mg, 5 mg, nasogastric tube, q4h PRN, Alesha Mckeon PA-C    oxygen (O2) therapy, , inhalation, Continuous - Inhalation, Mariluz Dailey MD, 40 percent at 04/17/24 0800    pantoprazole (ProtoNix) injection 40 mg, 40 mg, intravenous, Daily, Mariluz Dailey MD, 40 mg at 04/17/24 0932    sodium chloride (Ocean) 0.65 % nasal spray 1 spray, 1 spray, Each Nostril, 4x daily PRN, Mariluz Dailey MD      Assessment/Plan   Jason Hollins is a 63 y.o. 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 Hawkins and Dr. Smith on 3/5/24. On 3/20, he developed massive PE and was given TPA, taken to OR in setting of increased swelling at flap site- hematoma for exploration and evacuation. Nephrology following for RUTH.    #Acute Kidney Injury on Dialysis (RUTH-D), oliguric?  - etiology hemodynamic from hemorrhagic shock  - began on CVVH since 3/21 via R I J trialysis and transitioned to SLED on 4/2, then iHD on 4/15  - Cr baseline 0.6; Cr trend 1.9 - 2.5  - electrolytes: K 4.3 and HCO3 24 WNL  - UO 230mL in 24h; prior was 600mL;  I/Os net +1.4L  - FiO2 40% on - trach  - BP stable - no pressor  - s/p SLED 4/13 during day with 2L fluid removed  - s/p HD 4/15 with 1.8L UF  - trialysis cath removed    Myxoid Chondrosarcoma s/p wide resection 3/5/2024    Plan  - no need for dialysis today  - recommend IV lasix challenge can give 80mg x1; look for at least 200mL in next 2h; if UO adequate, can give add evening dose  - continue bladder scan once per shift to monitor for renal recovery  - no longer has dialysis access; if needs dialysis than would need catheter placed; will re-evaluate tomorrow    D/w Dr. Anatoly Duffy MD  Nephrology Fellow PGY 5  Contact via secure chat  Nephrology Pager # [phone] ([phone])

## 2024-04-17 NOTE — PROGRESS NOTES
"    Department of Plastic and Reconstructive Surgery  Daily Progress Note    Patient Name: Jason Hollins  MRN: [id number]  Date:  04/17/24     Subjective  Found resting in bed in [facility], niece at bedside. Following commands and shaking head appropriately. Tracks with eyes. Incisional wound vac dressing intact to R gluteal site, without issue overnight. ANDERSON drain outputs down-trending/stable, #2 possibly pulled in next day or 2.      Objective    Vital Signs  BP (!) 181/91 (BP Location: Right arm, Patient Position: Lying)   Pulse 102   Temp 36.3 °C (97.3 °F) (Temporal)   Resp (!) 37   Ht 1.778 m (5' 10\")   Wt 119 kg (262 lb 12.6 oz)   SpO2 95%   BMI 37.71 kg/m²      Physical Exam   Constitutional: Alert, lying in pressure relieving bed in ICU, appears critically ill.   ENMT: MMM, dobhoff in R nare.  Cardiovascular: RRR on telemetry monitor.  Respiratory/Thorax: Trach to vent.  Gastrointestinal: Abdomen soft, protuberant.   Musculoskeletal: Able to squeeze bilateral hands, wiggles toes on the left, minimal movement on RLE, effort to move foot observed.   Extremities: Generalized pitting edema. Right thigh edematous, but soft and compressible, no bruising or tissue induration noted. Visible portion of flap recipient site and adjacent anterior R thigh incisions appear stable. Posterior flap incision line and posterior thigh/gluteal region incision dressed with incisional wound vac, maintaining low continuous suction at -75mmHg, no alarms for leak, obstruction or malfunction, expectantly no output in collecting canister. ANDERSON drain x 3 right upper leg with dark serous outputs.  Neurological: Off sedation, alert and able to respond to commands with head nod, tracks with eyes.  Skin: Edematous, juandice, warm.     Diagnostics   Results for orders placed or performed during the hospital encounter of 03/05/24 (from the past 24 hour(s))   POCT GLUCOSE   Result Value Ref Range    POCT Glucose 185 (H) 74 - 99 mg/dL   Heparin " Assay, UFH   Result Value Ref Range    Heparin Unfractionated 0.3 See Comment Below for Therapeutic Ranges IU/mL   POCT GLUCOSE   Result Value Ref Range    POCT Glucose 239 (H) 74 - 99 mg/dL   CALCIUM, IONIZED   Result Value Ref Range    POCT Calcium, Ionized 1.14 1.1 - 1.33 mmol/L   CBC   Result Value Ref Range    WBC 8.2 4.4 - 11.3 x10*3/uL    nRBC 0.6 (H) 0.0 - 0.0 /100 WBCs    RBC 2.89 (L) 4.50 - 5.90 x10*6/uL    Hemoglobin 8.2 (L) 13.5 - 17.5 g/dL    Hematocrit 22.6 (L) 41.0 - 52.0 %    MCV 78 (L) 80 - 100 fL    MCH 28.4 26.0 - 34.0 pg    MCHC 36.3 (H) 32.0 - 36.0 g/dL    RDW 21.9 (H) 11.5 - 14.5 %    Platelets 148 (L) 150 - 450 x10*3/uL   Heparin Assay, UFH   Result Value Ref Range    Heparin Unfractionated 0.4 See Comment Below for Therapeutic Ranges IU/mL   Magnesium   Result Value Ref Range    Magnesium 2.28 1.60 - 2.40 mg/dL   Renal Function Panel   Result Value Ref Range    Glucose 227 (H) 74 - 99 mg/dL    Sodium 136 136 - 145 mmol/L    Potassium 4.3 3.5 - 5.3 mmol/L    Chloride 97 (L) 98 - 107 mmol/L    Bicarbonate 24 21 - 32 mmol/L    Anion Gap 19 10 - 20 mmol/L    Urea Nitrogen 79 (H) 6 - 23 mg/dL    Creatinine 2.52 (H) 0.50 - 1.30 mg/dL    eGFR 28 (L) >60 mL/min/1.73m*2    Calcium 9.1 8.6 - 10.6 mg/dL    Phosphorus 3.5 2.5 - 4.9 mg/dL    Albumin 3.6 3.4 - 5.0 g/dL   Coagulation Screen   Result Value Ref Range    Protime 16.2 (H) 9.8 - 12.8 seconds    INR 1.4 (H) 0.9 - 1.1    aPTT 106 (HH) 27 - 38 seconds   POCT GLUCOSE   Result Value Ref Range    POCT Glucose 190 (H) 74 - 99 mg/dL   POCT GLUCOSE   Result Value Ref Range    POCT Glucose 173 (H) 74 - 99 mg/dL   POCT GLUCOSE   Result Value Ref Range    POCT Glucose 200 (H) 74 - 99 mg/dL   POCT GLUCOSE   Result Value Ref Range    POCT Glucose 165 (H) 74 - 99 mg/dL     XR chest 1 view    Result Date: 4/17/2024  Interpreted By:  Yan Rouse, STUDY: XR CHEST 1 VIEW;  4/17/2024 7:07 am   INDICATION: Signs/Symptoms:intubated.   COMPARISON: Exam dated  04/16/2024   ACCESSION NUMBER(S): [id number]   ORDERING CLINICIAN: VIRGILIO HAGEN   FINDINGS: AP radiograph of the chest was provided.   Tracheostomy cannula is in place. Enteric tube projects over the esophagus, tip not visualized.   CARDIOMEDIASTINAL SILHOUETTE: Cardiomediastinal silhouette is stable in size and configuration.   LUNGS: Slight interval improvement in diffuse right-greater-than-left interstitial and airspace opacities. Stable layering small to moderate right and small left pleural effusions. No definite pneumothorax.   ABDOMEN: No remarkable upper abdominal findings.   BONES: No acute osseous changes.       1.  Slight interval improvement in right-greater-than-left pulmonary opacities. Findings likely represent a combination of edema, atelectasis with infection, ARDS, or pulmonary hemorrhage not excluded. 2. Stable small to moderate right and small left pleural effusions. 3. Medical devices as above.       MACRO: None   Signed by: Yan Rouse 4/17/2024 9:40 AM Dictation workstation:   [id number]      Current Medications  Scheduled medications  albumin human, 25 g, intravenous, q8h  fludrocortisone, 0.1 mg, nasogastric tube, q12h  hydrocortisone sodium succinate, 50 mg, intravenous, q8h  insulin glargine, 25 Units, subcutaneous, q12h  insulin lispro, 0-20 Units, subcutaneous, q4h  meropenem, 1 g, intravenous, q24h  midodrine, 10 mg, orogastric tube, q8h  oxygen, , inhalation, Continuous - Inhalation  pantoprazole, 40 mg, intravenous, Daily      Continuous medications  heparin, 0-4,500 Units/hr, Last Rate: 2,000 Units/hr (04/17/24 0544)  lactated Ringer's, 5 mL/hr, Last Rate: 5 mL/hr (04/16/24 1200)      PRN medications  PRN medications: acetaminophen, alteplase, calcium gluconate, calcium gluconate, dextrose, glucagon, labetaloL, magnesium sulfate, magnesium sulfate, midodrine, oxyCODONE, oxyCODONE, sodium chloride     Radha Hollins 63 y.o. 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 Hawkins and Dr. Smith on 3/5/24. Postoperative course c/b arrest requiring CPR with ROSC after TPA for assumed large PE on 3/20. Increased swelling at flap site appreciated overnight 3/20-3/21 and pt returned to OR for exploration of R flap site, evacuation of hematoma, suture ligation of multiple bleeding vessel sites in gluteal area and wound closure with Dr. Smith on 3/21. Pt has remained in ICU for continued critical care evaluation and management postoperatively. Returned to OR 4/11 with plastic surgery for R posterior thigh/gluteal region I&D with tissue advancement/complex closure and application of incisional wound vac.     Plan/Recommendations  - No additional acute surgical interventions planned from plastic surgery perspective this admission   - Maintain incisional wound vac to R posterior thigh wound site per plastic surgery x14 (4/25) days post-op         ·  Settings: -75 mmHg low continuous suction        ·  Monitor/record vac canister output [id number]       ·  Notify plastic surgery with any concerns of leak or obstruction  - Continue ANDERSON drain care to x3 drains present at R thigh flap reconstruction site      -> Drain output continues to steadily decrease; can consider potentially removing drain #2 in next few days if output quantities remain low      ·  Strip drain tubing TID and PRN     ·  Monitor and record output q8h      ·  Keep drain sites C/D/I      ·  Notify plastics if ANDERSON drain output exceeds > 100 ml/hr in one drain or > 300 ml/hr collectively     ·  OK for DC with drains in place, can be removed on outpatient follow up with plastics   - Avoid pressure application or positioning onto flap site or incisions at R upper leg   - Recommend patient be positioned off of R side as able to  prevent flap compression/wound breakdown   - Continue clinitron fluidized air mattress  - Appreciate remaining supportive care per ICU   - Plastic Surgery will continue to follow      Patient and plan discussed with Dr. Smith.      Karen Moreno PA-C  Plastic and Reconstructive Surgery   [place]  Pager #[phone]  Team phones: [phone]

## 2024-04-17 NOTE — PROGRESS NOTES
"Jason Hollins is a 63 y.o. male on day 43 of admission presenting with Soft tissue sarcoma (Multi).    Subjective   Gave him a challenge dose of 80 mg of Lasix to see his urine output, if he did not respond to it then we will ask nephrology to do hemodialysis today. Trial of CPAP today didn't succeed and now still on SIMVA.     Objective     Physical Exam  Vitals reviewed.   HENT:      Mouth/Throat:      Mouth: Mucous membranes are dry.   Cardiovascular:      Rate and Rhythm: Regular rhythm. Tachycardia present.      Pulses: Normal pulses.      Heart sounds: Normal heart sounds.   Pulmonary:      Comments: SIMV via trach tube  Abdominal:      Palpations: Abdomen is soft.   Neurological:      Mental Status: Mental status is at baseline.         Last Recorded Vitals  Blood pressure 175/90, pulse 103, temperature 36.9 °C (98.4 °F), temperature source Temporal, resp. rate 20, height 1.778 m (5' 10\"), weight 119 kg (262 lb 12.6 oz), SpO2 93%.  Intake/Output last 3 Shifts:  I/O last 3 completed shifts:  In: 3453.4 (29 mL/kg) [I.V.:708.4 (5.9 mL/kg); NG/GT:1645; IV Piggyback:1100]  Out: 1241 (10.4 mL/kg) [Urine:831 (0.2 mL/kg/hr); Drains:410]  Weight: 119.2 kg     Relevant Results  Scheduled medications  albumin human, 25 g, intravenous, q8h  fludrocortisone, 0.1 mg, nasogastric tube, q12h  hydrocortisone sodium succinate, 50 mg, intravenous, q8h  insulin glargine, 25 Units, subcutaneous, q12h  insulin lispro, 0-20 Units, subcutaneous, q4h  meropenem, 1 g, intravenous, q24h  midodrine, 10 mg, orogastric tube, q8h  oxygen, , inhalation, Continuous - Inhalation  pantoprazole, 40 mg, intravenous, Daily      Continuous medications  heparin, 0-4,500 Units/hr, Last Rate: 2,000 Units/hr (04/17/24 0544)  lactated Ringer's, 5 mL/hr, Last Rate: 5 mL/hr (04/16/24 1200)      PRN medications  PRN medications: acetaminophen, alteplase, calcium gluconate, calcium gluconate, dextrose, glucagon, labetaloL, magnesium sulfate, magnesium sulfate, " midodrine, oxyCODONE, oxyCODONE, sodium chloride      Assessment/Plan   Principal Problem:    Soft tissue sarcoma (Multi)  Active Problems:    Acute kidney injury (nontraumatic) (CMS-HCC)    Pulmonary embolus (Multi)    Anemia    Thrombocytopenia (CMS-HCC)    Current chronic use of systemic steroids    Gastroesophageal reflux disease without esophagitis    Extraskeletal myxoid chondrosarcoma (Multi)    Cardiopulmonary arrest (Multi)    Acute respiratory failure with hypoxia (Multi)    Tracheostomy hemorrhage (Multi)    Postoperative wound breakdown, initial encounter    Postoperative wound breakdown, sequela    Assessment:  Jason Hollins is a 63 y.o. 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 Hawkins and Dr. Smith on 3/5/24.  Post operative course was uncomplicated and patient was on RNF until 3/20 for prolonged bed rest to avoid hip flexion to maintain flap integrity.  Patient was on prophylactic lovenox while on bedrest.      3/20: Cardiopulmonary arrest s/p CPR with ROSC after TPA, overnight started on heparin, epo, increased pressor requirements, increased swelling at flap site.      3/21: Hemorrhagic shock, MTP. Firm and large right flap site. Return to OR s/p Exploration of right thigh wound, Evacuation of hematoma. Suture ligation of multiple bleeding vessel and several points in gluteal area.      4/1: s/p Trach     4/4: return OR with ENT s/p laryngoscopy, esophagoscopy and bronchoscopy, trach revision. Found extensive clot burden in esophagus and stomach as well as in the lungs. Oozing at thyroid bed cauterized. Trach exchanged to new 6.0 prox XLT elvie. OR findings:  blood up glottis and from thyroid bed to airway.     4/9: Patient is medically stable for OR on 4/11/24.  He is appropriately n.p.o. since  midnight and has had more DVT prophylaxis held for OR today.     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 Hawkins and Dr. Smith on 3/5/24 s/p cardiopulmonary arrest with ROSC 3/20. CT head 3/22 without acute process. Weaned off cisatracurium and propofol overnight 3/23-3/24. Ketamine weaned off 3/25 and Fentanyl weaned off 3/26 am. Repeat CT Head 3/26 without acute process. MRI Brain 3/30, negative for cerebral infarction or hemorrhage. Neuro exam - following commands except for RLE, tracking, nodding/shaking head to questions  - DC scheduled Oxycodone and keep the PRN   - PRN tylenol   -Dilaudid PRN   - ongoing neuro and pain assessments  - PT/OT -> AROM     CV: History of HTN, HLD. Acute cardiopulmonary arrest 2/2 to possible massive PE vs massive STEMI, received multiple rounds of CPR and one defibrillation.  Sustained ROSC achieved after TPA bolus. On high dose levophed, epinephrine, vaso, angioT2. Plan on 3/21 was for ECMO which was aborted secondary to large hemhorrge at right flap site. Had MTP and taken OR with 5L evacuated. ECHO 3/21: Normal LV, Mod enlarged RV, slight suggestion of a Townsend's sign / RV strain, low normal RV function. ECHO 3/22 with hyperdynamic LV. New onset Afib with RVR overnight 3/22-3/23, dosed with 150mg amio x2, started infusion at 1mg/min thereafter. AT2 weaned off. Amio infusion discontinued 3/25. Lactate downtrending. Vaso and epi weaned off. Remains in NSR. iEpo weaned off 3/27. Repeat TTE 3/29 and 4/2 essentially unchanged. Levo resumed 4/2 evening to continue aggressive fluid removal. Repeat TTE on 4/10 with LVEF 65-70% and RV difficulty to assess.   - Order 25% Albumin 25 g  - continuous EKG/abp/cvp monitoring  - Goal map range 65-90  - Decrease midodrine dose to 10 mg 3  times daily  - midodrine PRN for iHD if needed  - Continue florinef 0.1mg BID   -Continue stress dose steroids to 50 mg q 8h   - Off Levo since 4/13     PULM: Arrived to SICU intubated julio c-CPR. Concern for massive PE. Started on iEpo, currently on 0.05. Hypoxic respiratory failure. Severe ARDS. ETT exchanged 3/23. CT Chest 3/26 with interval JOHN consolidative/ground glass opacity. S/p Tracheostomy on 4/1. Bedside bronch -> some blood in trach, posterior wall tissue just distal to trach tip appears to be collapsing on cannula.   - SIMV, Trial of CPAP today didn't succeed   - ABGs prn  - additional pulm toilet prn -will discuss regimen with RT  - daily CXR -chest x-ray today indicates Slight interval improvement in right-greater-than-left pulmonary  opacities. Noticeable R sided pleural effusion - similar to prior day     ENT: Had epistaxis 3/23, completed afrin bid x3 days. S/p trach on 4/1. Bleeding from trach site 4/2 am, packing placed by ENT. Repeat episode of bloody tracheal secretions 4/3 through this am. Taken back to OR with ENT 4/4 as per above.  -Had Slight bleeding from trach site noted per bedside nurse, however he was evaluated by ENT and they mentioned there was no evidence of bleeding in the patient's oropharynx and its most likely because of traumatic suctioning.        GI: NPO. Shock liver, pancreatitis. Elevated TG. Elevated lactate. Consulted ACS for potential bowel ischemia as cause of persistent lactic acidosis. CT imaging 3/22 with evidence of pancreatitis. No acute surgical indication per ACS. RUQ US 3/22 with thickening of GB wall without cholelithiasis. CT A/P 3/26 negative for acute bleed. LFTs downtrending. Worsening hyperbilirubinemia. Amylase and lipase now WNL. Repeat RUQ US 4/1 with nonspecific gallbladder wall edema and thickening which may be 2/2 generalized fluid overload, mild hepatomegaly with slight nodular contour and c/f steatosis and fibrosis, irregular hypoechoic region  adjacent to hepatic veins. US liver with doppler 4/2 revealed hepatomegaly with nodular contour, mildly elevated RI in main and left hepatic arteries. Hyperbilirubinemia  - Still has diarrheal episodes   - On Simethicone for gaseous distention on KUB  - PPI daily for GI prophylaxis  - Trend LFTs daily -AST/ALT/total bilirubin is approximately similar to prior days.  Per discussion with hepatology, minor fluctuations are to be expected prior to eventual downtrend     : No history of renal disease. Baseline creatinine 0.6. Anuric RUTH. Elevated CK, downtrending. Metabolic acidosis, total body fluid overload, hyperkalemia. Started on CVVH 3/21, stopped bicarb infusion 3/22. Marino removed 3/24. Hyponatremia resolved. Stopped CVVH 4/2.  Has been tolerating iHD. Net +ve 1467.   - challenge dose of 80 mg Lasix per nephro recs, if he did not respond to it then we will ask nephrology to do hemodialysis today  - Nephrology following   - PRN midodrine if needed with iHD  - RFP BID and PRN  - Replete electrolytes judiciously     HEME: Patient was on ppx lovenox for DVT prophylaxis prior to cardiopulmonary arrest. Concern for massive PE patient received bolus of TPA during CPR. Started on heparin infusion overnight given concern for PE. Hemorrhagic shock 3/21, MTP and back to OR s/p Exploration of right thigh woundEvacuation of hematoma. Suture ligation of multiple bleeding vessel and several points in gluteal area. Hematology consulted 3/22 to r/o HLH. Transfused 1 RBC overnight 3/22-3/23. Thrombocytopenia, coagulapathy, hypofibrinogenemia. LE Duplex 3/25 +acute occlusive R soleal DVT. Received 2u PRBC and 1u FFP on 3/26. Heparin infusion discontinued 3/26 to r/o HIT and transitioned to bival. PF4 negative, resumed heparin infuision 3/27. Elevated IL2R and decreased NK function. C/f HLH (low suspicion), empirically treated with decadron (3/28-3/31). Thrombocytopenia on 3/30 to 18k, received 5pk, did not increment. Heparin  held. Thrombocytopenia likely 2/2 to consumptive process, hematology following. Received IVIG and 5pk plts 3/31. Pancytopenia persists, improving thrombocytopenia  - cbc, coags bid and prn  - Resume heparin gtt   - Hematology following, appreciate recs -> maintain Plt count >35k  - ongoing monitoring for s/s bleeding  - close surveillance of RLE and drains  - maintain active T&S (4/16)     ENDO: History of DM2. A1c 7.5%. TSH WNL 1/2024. Hyperglycemia  - Increased Lantus to 25u BID   - q4h accuchecks and SSI #4     ID: Leukocytosis resolved, now with leukopenia. On broad antimicrobial therapy. MRSA screen + 2/28/24. Pan cultures sent 3/22. Sputum NTF, +MRSA screen (completed 5 day course mupirocin), UCx and BCx negative. Completed 7 day course vanc/zosyn 3/27. Febrile to 39.1 4/3, resent blood cultures and BAL and started vanco and zosyn. Switched zosyn to reynaldo, kept on vanco, added john. Blood cultures and sputum with Klebsiella. Repeat blood cultures sent 4/4.  - F/U cultures - Klebsiella grown in wound culture - susceptible to Meropenem per ID  - temp q4h, wbc daily  - Tissue cultures growing Klebsiella  - Per ID - discussed with pharmacy who recommended 1g BID dosing, continue Meropenem   - ongoing monitoring for s/s infection  - plastics team monitoring wound site - no increased drainage noted in drains and swelling consistent with prior day.   - If continues to be hypotensive consider broaden abx/ consider adding antifungal     Lines:  - right radial a line (4/5)  - PIV x3  - IR consult for tunneled line in preparation for LTACH placement later this week, care coordinator working with patient and family re placement.     Patient discussed with Dr. Ness.         Joyce Sy MD  [facility] PGY1

## 2024-04-18 ENCOUNTER — APPOINTMENT (OUTPATIENT)
Dept: RADIOLOGY | Facility: HOSPITAL | Age: 64
DRG: 003 | End: 2024-04-18
Payer: COMMERCIAL

## 2024-04-18 ENCOUNTER — ANESTHESIA (OUTPATIENT)
Dept: OPERATING ROOM | Facility: HOSPITAL | Age: 64
DRG: 003 | End: 2024-04-18
Payer: COMMERCIAL

## 2024-04-18 ENCOUNTER — ANESTHESIA EVENT (OUTPATIENT)
Dept: OPERATING ROOM | Facility: HOSPITAL | Age: 64
DRG: 003 | End: 2024-04-18
Payer: COMMERCIAL

## 2024-04-18 PROBLEM — T14.8XXA HEMATOMA: Status: ACTIVE | Noted: 2024-02-28

## 2024-04-18 LAB
ABO GROUP (TYPE) IN BLOOD: NORMAL
ALBUMIN SERPL BCP-MCNC: 2.9 G/DL (ref 3.4–5)
ALBUMIN SERPL BCP-MCNC: 3.1 G/DL (ref 3.4–5)
ALP SERPL-CCNC: 185 U/L (ref 33–136)
ALT SERPL W P-5'-P-CCNC: 5 U/L (ref 10–52)
ANION GAP BLDA CALCULATED.4IONS-SCNC: 13 MMO/L (ref 10–25)
ANION GAP BLDA CALCULATED.4IONS-SCNC: 13 MMO/L (ref 10–25)
ANION GAP BLDA CALCULATED.4IONS-SCNC: 14 MMO/L (ref 10–25)
ANION GAP BLDA CALCULATED.4IONS-SCNC: 15 MMO/L (ref 10–25)
ANION GAP BLDA CALCULATED.4IONS-SCNC: 16 MMO/L (ref 10–25)
ANION GAP BLDA CALCULATED.4IONS-SCNC: 17 MMO/L (ref 10–25)
ANION GAP SERPL CALC-SCNC: 21 MMOL/L (ref 10–20)
ANION GAP SERPL CALC-SCNC: 21 MMOL/L (ref 10–20)
ANTIBODY SCREEN: NORMAL
APTT PPP: 140 SECONDS (ref 27–38)
APTT PPP: 32 SECONDS (ref 27–38)
APTT PPP: 33 SECONDS (ref 27–38)
APTT PPP: 37 SECONDS (ref 27–38)
AST SERPL W P-5'-P-CCNC: 98 U/L (ref 9–39)
BASE EXCESS BLDA CALC-SCNC: -0.8 MMOL/L (ref -2–3)
BASE EXCESS BLDA CALC-SCNC: -1.3 MMOL/L (ref -2–3)
BASE EXCESS BLDA CALC-SCNC: -1.3 MMOL/L (ref -2–3)
BASE EXCESS BLDA CALC-SCNC: -1.9 MMOL/L (ref -2–3)
BASE EXCESS BLDA CALC-SCNC: -2.2 MMOL/L (ref -2–3)
BASE EXCESS BLDA CALC-SCNC: -3.9 MMOL/L (ref -2–3)
BASE EXCESS BLDA CALC-SCNC: -5.6 MMOL/L (ref -2–3)
BILIRUB DIRECT SERPL-MCNC: 16.5 MG/DL (ref 0–0.3)
BILIRUB SERPL-MCNC: 23.9 MG/DL (ref 0–1.2)
BLOOD EXPIRATION DATE: NORMAL
BODY TEMPERATURE: 37 DEGREES CELSIUS
BUN SERPL-MCNC: 101 MG/DL (ref 6–23)
BUN SERPL-MCNC: 103 MG/DL (ref 6–23)
CA-I BLD-SCNC: 1.05 MMOL/L (ref 1.1–1.33)
CA-I BLD-SCNC: 1.12 MMOL/L (ref 1.1–1.33)
CA-I BLDA-SCNC: 1.11 MMOL/L (ref 1.1–1.33)
CA-I BLDA-SCNC: 1.12 MMOL/L (ref 1.1–1.33)
CA-I BLDA-SCNC: 1.13 MMOL/L (ref 1.1–1.33)
CA-I BLDA-SCNC: 1.14 MMOL/L (ref 1.1–1.33)
CALCIUM SERPL-MCNC: 8.5 MG/DL (ref 8.6–10.6)
CALCIUM SERPL-MCNC: 8.5 MG/DL (ref 8.6–10.6)
CFT FORM KAOLIN IND BLD RES TEG: 2.1 MIN (ref 0.8–2.1)
CHLORIDE BLDA-SCNC: 100 MMOL/L (ref 98–107)
CHLORIDE BLDA-SCNC: 101 MMOL/L (ref 98–107)
CHLORIDE BLDA-SCNC: 102 MMOL/L (ref 98–107)
CHLORIDE BLDA-SCNC: 103 MMOL/L (ref 98–107)
CHLORIDE SERPL-SCNC: 97 MMOL/L (ref 98–107)
CHLORIDE SERPL-SCNC: 98 MMOL/L (ref 98–107)
CLOT ANGLE.KAOLIN INDUCED BLD RES TEG: 66 DEG (ref 63–78)
CLOT INIT KAO IND P HEP NEUT BLD RES TEG: 8.2 MIN (ref 4.6–9.1)
CLOT INIT KAO IND P HEP NEUT BLD RES TEG: 9 MIN (ref 4.3–8.3)
CO2 SERPL-SCNC: 23 MMOL/L (ref 21–32)
CO2 SERPL-SCNC: 23 MMOL/L (ref 21–32)
CREAT SERPL-MCNC: 2.95 MG/DL (ref 0.5–1.3)
CREAT SERPL-MCNC: 2.99 MG/DL (ref 0.5–1.3)
DISPENSE STATUS: NORMAL
EGFRCR SERPLBLD CKD-EPI 2021: 23 ML/MIN/1.73M*2
EGFRCR SERPLBLD CKD-EPI 2021: 23 ML/MIN/1.73M*2
ERYTHROCYTE [DISTWIDTH] IN BLOOD BY AUTOMATED COUNT: 17.9 % (ref 11.5–14.5)
ERYTHROCYTE [DISTWIDTH] IN BLOOD BY AUTOMATED COUNT: 17.9 % (ref 11.5–14.5)
ERYTHROCYTE [DISTWIDTH] IN BLOOD BY AUTOMATED COUNT: 19.4 % (ref 11.5–14.5)
ERYTHROCYTE [DISTWIDTH] IN BLOOD BY AUTOMATED COUNT: 19.8 % (ref 11.5–14.5)
ERYTHROCYTE [DISTWIDTH] IN BLOOD BY AUTOMATED COUNT: 20.8 % (ref 11.5–14.5)
ERYTHROCYTE [DISTWIDTH] IN BLOOD BY AUTOMATED COUNT: 21.6 % (ref 11.5–14.5)
FIBRINOGEN BLD CALC-MCNC: 314 MG/DL (ref 278–581)
FIBRINOGEN PPP-MCNC: 169 MG/DL (ref 200–400)
FIBRINOGEN PPP-MCNC: 171 MG/DL (ref 200–400)
GLUCOSE BLD MANUAL STRIP-MCNC: 116 MG/DL (ref 74–99)
GLUCOSE BLD MANUAL STRIP-MCNC: 132 MG/DL (ref 74–99)
GLUCOSE BLD MANUAL STRIP-MCNC: 154 MG/DL (ref 74–99)
GLUCOSE BLD MANUAL STRIP-MCNC: 165 MG/DL (ref 74–99)
GLUCOSE BLD MANUAL STRIP-MCNC: 174 MG/DL (ref 74–99)
GLUCOSE BLDA-MCNC: 157 MG/DL (ref 74–99)
GLUCOSE BLDA-MCNC: 172 MG/DL (ref 74–99)
GLUCOSE BLDA-MCNC: 76 MG/DL (ref 74–99)
GLUCOSE BLDA-MCNC: 94 MG/DL (ref 74–99)
GLUCOSE BLDA-MCNC: 94 MG/DL (ref 74–99)
GLUCOSE BLDA-MCNC: 96 MG/DL (ref 74–99)
GLUCOSE SERPL-MCNC: 151 MG/DL (ref 74–99)
GLUCOSE SERPL-MCNC: 172 MG/DL (ref 74–99)
HCO3 BLDA-SCNC: 21.1 MMOL/L (ref 22–26)
HCO3 BLDA-SCNC: 21.7 MMOL/L (ref 22–26)
HCO3 BLDA-SCNC: 21.8 MMOL/L (ref 22–26)
HCO3 BLDA-SCNC: 21.8 MMOL/L (ref 22–26)
HCO3 BLDA-SCNC: 21.9 MMOL/L (ref 22–26)
HCO3 BLDA-SCNC: 22.6 MMOL/L (ref 22–26)
HCO3 BLDA-SCNC: 23.3 MMOL/L (ref 22–26)
HCT VFR BLD AUTO: 18.5 % (ref 41–52)
HCT VFR BLD AUTO: 19.2 % (ref 41–52)
HCT VFR BLD AUTO: 19.8 % (ref 41–52)
HCT VFR BLD AUTO: 20.7 % (ref 41–52)
HCT VFR BLD AUTO: 22.7 % (ref 41–52)
HCT VFR BLD AUTO: 23.3 % (ref 41–52)
HCT VFR BLD EST: 21 % (ref 41–52)
HCT VFR BLD EST: 23 % (ref 41–52)
HCT VFR BLD EST: 28 % (ref 41–52)
HCT VFR BLD EST: 29 % (ref 41–52)
HCT VFR BLD EST: 29 % (ref 41–52)
HCT VFR BLD EST: 31 % (ref 41–52)
HGB BLD-MCNC: 6.9 G/DL (ref 13.5–17.5)
HGB BLD-MCNC: 7.1 G/DL (ref 13.5–17.5)
HGB BLD-MCNC: 7.1 G/DL (ref 13.5–17.5)
HGB BLD-MCNC: 7.2 G/DL (ref 13.5–17.5)
HGB BLD-MCNC: 8.4 G/DL (ref 13.5–17.5)
HGB BLD-MCNC: 8.8 G/DL (ref 13.5–17.5)
HGB BLDA-MCNC: 10.4 G/DL (ref 13.5–17.5)
HGB BLDA-MCNC: 7 G/DL (ref 13.5–17.5)
HGB BLDA-MCNC: 7.6 G/DL (ref 13.5–17.5)
HGB BLDA-MCNC: 9.2 G/DL (ref 13.5–17.5)
HGB BLDA-MCNC: 9.5 G/DL (ref 13.5–17.5)
HGB BLDA-MCNC: 9.8 G/DL (ref 13.5–17.5)
HOLD SPECIMEN: NORMAL
INHALED O2 CONCENTRATION: 100 %
INHALED O2 CONCENTRATION: 40 %
INHALED O2 CONCENTRATION: 40 %
INHALED O2 CONCENTRATION: 50 %
INR PPP: 1.3 (ref 0.9–1.1)
INR PPP: 1.5 (ref 0.9–1.1)
LACTATE BLDA-SCNC: 1.7 MMOL/L (ref 0.4–2)
LACTATE BLDA-SCNC: 1.9 MMOL/L (ref 0.4–2)
LACTATE BLDA-SCNC: 2 MMOL/L (ref 0.4–2)
LACTATE BLDA-SCNC: 2.1 MMOL/L (ref 0.4–2)
LACTATE BLDA-SCNC: 2.2 MMOL/L (ref 0.4–2)
LACTATE BLDA-SCNC: 2.4 MMOL/L (ref 0.4–2)
MA KAOLIN BLD RES TEG: 58 MM (ref 52–69)
MA KAOLIN+TF BLD RES TEG: 57 MM (ref 52–70)
MA TF IND+IIB-IIIA INH BLD RES TEG: 17 MM (ref 15–32)
MAGNESIUM SERPL-MCNC: 2.4 MG/DL (ref 1.6–2.4)
MCH RBC QN AUTO: 28 PG (ref 26–34)
MCH RBC QN AUTO: 28.1 PG (ref 26–34)
MCH RBC QN AUTO: 28.5 PG (ref 26–34)
MCH RBC QN AUTO: 28.5 PG (ref 26–34)
MCH RBC QN AUTO: 29 PG (ref 26–34)
MCH RBC QN AUTO: 29.1 PG (ref 26–34)
MCHC RBC AUTO-ENTMCNC: 34.3 G/DL (ref 32–36)
MCHC RBC AUTO-ENTMCNC: 36.4 G/DL (ref 32–36)
MCHC RBC AUTO-ENTMCNC: 37 G/DL (ref 32–36)
MCHC RBC AUTO-ENTMCNC: 37 G/DL (ref 32–36)
MCHC RBC AUTO-ENTMCNC: 37.3 G/DL (ref 32–36)
MCHC RBC AUTO-ENTMCNC: 37.8 G/DL (ref 32–36)
MCV RBC AUTO: 76 FL (ref 80–100)
MCV RBC AUTO: 77 FL (ref 80–100)
MCV RBC AUTO: 78 FL (ref 80–100)
MCV RBC AUTO: 82 FL (ref 80–100)
NRBC BLD-RTO: 0.4 /100 WBCS (ref 0–0)
NRBC BLD-RTO: 0.6 /100 WBCS (ref 0–0)
NRBC BLD-RTO: 0.7 /100 WBCS (ref 0–0)
NRBC BLD-RTO: 1.1 /100 WBCS (ref 0–0)
OXYHGB MFR BLDA: 87.4 % (ref 94–98)
OXYHGB MFR BLDA: 93.9 % (ref 94–98)
OXYHGB MFR BLDA: 94.3 % (ref 94–98)
OXYHGB MFR BLDA: 95.1 % (ref 94–98)
OXYHGB MFR BLDA: 95.2 % (ref 94–98)
PCO2 BLDA: 29 MM HG (ref 38–42)
PCO2 BLDA: 30 MM HG (ref 38–42)
PCO2 BLDA: 30 MM HG (ref 38–42)
PCO2 BLDA: 31 MM HG (ref 38–42)
PCO2 BLDA: 32 MM HG (ref 38–42)
PCO2 BLDA: 51 MM HG (ref 38–42)
PCO2 BLDA: 52 MM HG (ref 38–42)
PH BLDA: 7.24 PH (ref 7.38–7.42)
PH BLDA: 7.26 PH (ref 7.38–7.42)
PH BLDA: 7.44 PH (ref 7.38–7.42)
PH BLDA: 7.47 PH (ref 7.38–7.42)
PHOSPHATE SERPL-MCNC: 4.7 MG/DL (ref 2.5–4.9)
PHOSPHATE SERPL-MCNC: 5.4 MG/DL (ref 2.5–4.9)
PLATELET # BLD AUTO: 114 X10*3/UL (ref 150–450)
PLATELET # BLD AUTO: 120 X10*3/UL (ref 150–450)
PLATELET # BLD AUTO: 122 X10*3/UL (ref 150–450)
PLATELET # BLD AUTO: 130 X10*3/UL (ref 150–450)
PLATELET # BLD AUTO: 131 X10*3/UL (ref 150–450)
PLATELET # BLD AUTO: 171 X10*3/UL (ref 150–450)
PO2 BLDA: 61 MM HG (ref 85–95)
PO2 BLDA: 73 MM HG (ref 85–95)
PO2 BLDA: 74 MM HG (ref 85–95)
PO2 BLDA: 74 MM HG (ref 85–95)
PO2 BLDA: 76 MM HG (ref 85–95)
PO2 BLDA: 84 MM HG (ref 85–95)
PO2 BLDA: 91 MM HG (ref 85–95)
POTASSIUM BLDA-SCNC: 4.5 MMOL/L (ref 3.5–5.3)
POTASSIUM BLDA-SCNC: 4.9 MMOL/L (ref 3.5–5.3)
POTASSIUM BLDA-SCNC: 5 MMOL/L (ref 3.5–5.3)
POTASSIUM BLDA-SCNC: 5.2 MMOL/L (ref 3.5–5.3)
POTASSIUM BLDA-SCNC: 5.3 MMOL/L (ref 3.5–5.3)
POTASSIUM BLDA-SCNC: 5.5 MMOL/L (ref 3.5–5.3)
POTASSIUM SERPL-SCNC: 4.9 MMOL/L (ref 3.5–5.3)
POTASSIUM SERPL-SCNC: 5.2 MMOL/L (ref 3.5–5.3)
PRODUCT BLOOD TYPE: 5100
PRODUCT BLOOD TYPE: 6200
PRODUCT CODE: NORMAL
PROT SERPL-MCNC: 5.1 G/DL (ref 6.4–8.2)
PROTHROMBIN TIME: 15.1 SECONDS (ref 9.8–12.8)
PROTHROMBIN TIME: 16.4 SECONDS (ref 9.8–12.8)
PROTHROMBIN TIME: 16.8 SECONDS (ref 9.8–12.8)
PROTHROMBIN TIME: 17.4 SECONDS (ref 9.8–12.8)
RBC # BLD AUTO: 2.42 X10*6/UL (ref 4.5–5.9)
RBC # BLD AUTO: 2.49 X10*6/UL (ref 4.5–5.9)
RBC # BLD AUTO: 2.54 X10*6/UL (ref 4.5–5.9)
RBC # BLD AUTO: 2.56 X10*6/UL (ref 4.5–5.9)
RBC # BLD AUTO: 2.9 X10*6/UL (ref 4.5–5.9)
RBC # BLD AUTO: 3.02 X10*6/UL (ref 4.5–5.9)
RH FACTOR (ANTIGEN D): NORMAL
SAO2 % BLDA: 90 % (ref 94–100)
SAO2 % BLDA: 97 % (ref 94–100)
SAO2 % BLDA: 98 % (ref 94–100)
SAO2 % BLDA: 99 % (ref 94–100)
SAO2 % BLDA: 99 % (ref 94–100)
SODIUM BLDA-SCNC: 132 MMOL/L (ref 136–145)
SODIUM BLDA-SCNC: 133 MMOL/L (ref 136–145)
SODIUM BLDA-SCNC: 134 MMOL/L (ref 136–145)
SODIUM SERPL-SCNC: 136 MMOL/L (ref 136–145)
SODIUM SERPL-SCNC: 137 MMOL/L (ref 136–145)
UFH PPP CHRO-ACNC: 0.5 IU/ML
UNIT ABO: NORMAL
UNIT NUMBER: NORMAL
UNIT RH: NORMAL
UNIT VOLUME: 209
UNIT VOLUME: 218
UNIT VOLUME: 275
UNIT VOLUME: 276
UNIT VOLUME: 277
UNIT VOLUME: 306
UNIT VOLUME: 314
UNIT VOLUME: 326
UNIT VOLUME: 327
UNIT VOLUME: 350
UNIT VOLUME: 366
UNIT VOLUME: 69
UNIT VOLUME: 84
WBC # BLD AUTO: 5.7 X10*3/UL (ref 4.4–11.3)
WBC # BLD AUTO: 6.9 X10*3/UL (ref 4.4–11.3)
WBC # BLD AUTO: 6.9 X10*3/UL (ref 4.4–11.3)
WBC # BLD AUTO: 7 X10*3/UL (ref 4.4–11.3)
WBC # BLD AUTO: 7.2 X10*3/UL (ref 4.4–11.3)
WBC # BLD AUTO: 7.9 X10*3/UL (ref 4.4–11.3)
XM INTEP: NORMAL

## 2024-04-18 PROCEDURE — 99231 SBSQ HOSP IP/OBS SF/LOW 25: CPT | Performed by: INTERNAL MEDICINE

## 2024-04-18 PROCEDURE — 84132 ASSAY OF SERUM POTASSIUM: CPT | Performed by: STUDENT IN AN ORGANIZED HEALTH CARE EDUCATION/TRAINING PROGRAM

## 2024-04-18 PROCEDURE — 85610 PROTHROMBIN TIME: CPT | Performed by: STUDENT IN AN ORGANIZED HEALTH CARE EDUCATION/TRAINING PROGRAM

## 2024-04-18 PROCEDURE — 3600000007 HC OR TIME - EACH INCREMENTAL 1 MINUTE - PROCEDURE LEVEL TWO

## 2024-04-18 PROCEDURE — 83735 ASSAY OF MAGNESIUM: CPT | Performed by: STUDENT IN AN ORGANIZED HEALTH CARE EDUCATION/TRAINING PROGRAM

## 2024-04-18 PROCEDURE — 85027 COMPLETE CBC AUTOMATED: CPT

## 2024-04-18 PROCEDURE — 01NF0ZZ RELEASE SCIATIC NERVE, OPEN APPROACH: ICD-10-PCS

## 2024-04-18 PROCEDURE — 2020000001 HC ICU ROOM DAILY

## 2024-04-18 PROCEDURE — 2500000004 HC RX 250 GENERAL PHARMACY W/ HCPCS (ALT 636 FOR OP/ED): Performed by: PHYSICIAN ASSISTANT

## 2024-04-18 PROCEDURE — P9040 RBC LEUKOREDUCED IRRADIATED: HCPCS

## 2024-04-18 PROCEDURE — 35860 EXPLORE LIMB VESSELS: CPT

## 2024-04-18 PROCEDURE — 82947 ASSAY GLUCOSE BLOOD QUANT: CPT

## 2024-04-18 PROCEDURE — A4217 STERILE WATER/SALINE, 500 ML: HCPCS

## 2024-04-18 PROCEDURE — 85027 COMPLETE CBC AUTOMATED: CPT | Performed by: STUDENT IN AN ORGANIZED HEALTH CARE EDUCATION/TRAINING PROGRAM

## 2024-04-18 PROCEDURE — 3600000002 HC OR TIME - INITIAL BASE CHARGE - PROCEDURE LEVEL TWO

## 2024-04-18 PROCEDURE — 3700000001 HC GENERAL ANESTHESIA TIME - INITIAL BASE CHARGE

## 2024-04-18 PROCEDURE — 71045 X-RAY EXAM CHEST 1 VIEW: CPT | Performed by: RADIOLOGY

## 2024-04-18 PROCEDURE — 85384 FIBRINOGEN ACTIVITY: CPT | Performed by: NURSE PRACTITIONER

## 2024-04-18 PROCEDURE — 85384 FIBRINOGEN ACTIVITY: CPT | Performed by: STUDENT IN AN ORGANIZED HEALTH CARE EDUCATION/TRAINING PROGRAM

## 2024-04-18 PROCEDURE — P9073 PLATELETS PHERESIS PATH REDU: HCPCS

## 2024-04-18 PROCEDURE — P9017 PLASMA 1 DONOR FRZ W/IN 8 HR: HCPCS

## 2024-04-18 PROCEDURE — 99233 SBSQ HOSP IP/OBS HIGH 50: CPT | Performed by: STUDENT IN AN ORGANIZED HEALTH CARE EDUCATION/TRAINING PROGRAM

## 2024-04-18 PROCEDURE — 2720000007 HC OR 272 NO HCPCS

## 2024-04-18 PROCEDURE — 2500000004 HC RX 250 GENERAL PHARMACY W/ HCPCS (ALT 636 FOR OP/ED)

## 2024-04-18 PROCEDURE — 37799 UNLISTED PX VASCULAR SURGERY: CPT | Performed by: STUDENT IN AN ORGANIZED HEALTH CARE EDUCATION/TRAINING PROGRAM

## 2024-04-18 PROCEDURE — 71045 X-RAY EXAM CHEST 1 VIEW: CPT | Performed by: STUDENT IN AN ORGANIZED HEALTH CARE EDUCATION/TRAINING PROGRAM

## 2024-04-18 PROCEDURE — 2500000001 HC RX 250 WO HCPCS SELF ADMINISTERED DRUGS (ALT 637 FOR MEDICARE OP): Performed by: STUDENT IN AN ORGANIZED HEALTH CARE EDUCATION/TRAINING PROGRAM

## 2024-04-18 PROCEDURE — 36430 TRANSFUSION BLD/BLD COMPNT: CPT

## 2024-04-18 PROCEDURE — 71045 X-RAY EXAM CHEST 1 VIEW: CPT

## 2024-04-18 PROCEDURE — 2500000004 HC RX 250 GENERAL PHARMACY W/ HCPCS (ALT 636 FOR OP/ED): Performed by: STUDENT IN AN ORGANIZED HEALTH CARE EDUCATION/TRAINING PROGRAM

## 2024-04-18 PROCEDURE — 2780000003 HC OR 278 NO HCPCS

## 2024-04-18 PROCEDURE — P9012 CRYOPRECIPITATE EACH UNIT: HCPCS

## 2024-04-18 PROCEDURE — 2500000005 HC RX 250 GENERAL PHARMACY W/O HCPCS: Performed by: STUDENT IN AN ORGANIZED HEALTH CARE EDUCATION/TRAINING PROGRAM

## 2024-04-18 PROCEDURE — A10140 PR DRAINAGE OF HEMATOMA/FLUID: Performed by: ANESTHESIOLOGY

## 2024-04-18 PROCEDURE — 82805 BLOOD GASES W/O2 SATURATION: CPT | Performed by: STUDENT IN AN ORGANIZED HEALTH CARE EDUCATION/TRAINING PROGRAM

## 2024-04-18 PROCEDURE — 87081 CULTURE SCREEN ONLY: CPT | Performed by: STUDENT IN AN ORGANIZED HEALTH CARE EDUCATION/TRAINING PROGRAM

## 2024-04-18 PROCEDURE — C9113 INJ PANTOPRAZOLE SODIUM, VIA: HCPCS | Performed by: STUDENT IN AN ORGANIZED HEALTH CARE EDUCATION/TRAINING PROGRAM

## 2024-04-18 PROCEDURE — 99140 ANES COMP EMERGENCY COND: CPT | Performed by: ANESTHESIOLOGY

## 2024-04-18 PROCEDURE — 99291 CRITICAL CARE FIRST HOUR: CPT | Performed by: STUDENT IN AN ORGANIZED HEALTH CARE EDUCATION/TRAINING PROGRAM

## 2024-04-18 PROCEDURE — 0KCN0ZZ EXTIRPATION OF MATTER FROM RIGHT HIP MUSCLE, OPEN APPROACH: ICD-10-PCS

## 2024-04-18 PROCEDURE — 64712 REVISION OF SCIATIC NERVE: CPT

## 2024-04-18 PROCEDURE — 99024 POSTOP FOLLOW-UP VISIT: CPT | Performed by: PHYSICIAN ASSISTANT

## 2024-04-18 PROCEDURE — 2500000001 HC RX 250 WO HCPCS SELF ADMINISTERED DRUGS (ALT 637 FOR MEDICARE OP): Performed by: PHYSICIAN ASSISTANT

## 2024-04-18 PROCEDURE — P9047 ALBUMIN (HUMAN), 25%, 50ML: HCPCS | Mod: JZ | Performed by: STUDENT IN AN ORGANIZED HEALTH CARE EDUCATION/TRAINING PROGRAM

## 2024-04-18 PROCEDURE — 3700000002 HC GENERAL ANESTHESIA TIME - EACH INCREMENTAL 1 MINUTE

## 2024-04-18 PROCEDURE — 90937 HEMODIALYSIS REPEATED EVAL: CPT

## 2024-04-18 PROCEDURE — 94003 VENT MGMT INPAT SUBQ DAY: CPT

## 2024-04-18 PROCEDURE — 86920 COMPATIBILITY TEST SPIN: CPT

## 2024-04-18 PROCEDURE — 36556 INSERT NON-TUNNEL CV CATH: CPT | Performed by: STUDENT IN AN ORGANIZED HEALTH CARE EDUCATION/TRAINING PROGRAM

## 2024-04-18 PROCEDURE — 36556 INSERT NON-TUNNEL CV CATH: CPT | Mod: GC | Performed by: STUDENT IN AN ORGANIZED HEALTH CARE EDUCATION/TRAINING PROGRAM

## 2024-04-18 PROCEDURE — 86901 BLOOD TYPING SEROLOGIC RH(D): CPT | Performed by: STUDENT IN AN ORGANIZED HEALTH CARE EDUCATION/TRAINING PROGRAM

## 2024-04-18 PROCEDURE — 82330 ASSAY OF CALCIUM: CPT | Performed by: STUDENT IN AN ORGANIZED HEALTH CARE EDUCATION/TRAINING PROGRAM

## 2024-04-18 PROCEDURE — P9045 ALBUMIN (HUMAN), 5%, 250 ML: HCPCS | Mod: JZ

## 2024-04-18 PROCEDURE — 13160 SEC CLSR SURG WND/DEHSN XTN: CPT

## 2024-04-18 PROCEDURE — P9047 ALBUMIN (HUMAN), 25%, 50ML: HCPCS | Mod: JZ | Performed by: PHYSICIAN ASSISTANT

## 2024-04-18 PROCEDURE — 84100 ASSAY OF PHOSPHORUS: CPT | Performed by: STUDENT IN AN ORGANIZED HEALTH CARE EDUCATION/TRAINING PROGRAM

## 2024-04-18 PROCEDURE — 82248 BILIRUBIN DIRECT: CPT | Performed by: STUDENT IN AN ORGANIZED HEALTH CARE EDUCATION/TRAINING PROGRAM

## 2024-04-18 PROCEDURE — 0Y300ZZ CONTROL BLEEDING IN RIGHT BUTTOCK, OPEN APPROACH: ICD-10-PCS

## 2024-04-18 PROCEDURE — 2500000005 HC RX 250 GENERAL PHARMACY W/O HCPCS

## 2024-04-18 PROCEDURE — 2500000004 HC RX 250 GENERAL PHARMACY W/ HCPCS (ALT 636 FOR OP/ED): Mod: JZ | Performed by: STUDENT IN AN ORGANIZED HEALTH CARE EDUCATION/TRAINING PROGRAM

## 2024-04-18 RX ORDER — SODIUM CHLORIDE 0.9 G/100ML
IRRIGANT IRRIGATION AS NEEDED
Status: DISCONTINUED | OUTPATIENT
Start: 2024-04-18 | End: 2024-04-18 | Stop reason: HOSPADM

## 2024-04-18 RX ORDER — ALBUMIN HUMAN 50 G/1000ML
SOLUTION INTRAVENOUS
Status: DISPENSED
Start: 2024-04-18 | End: 2024-04-18

## 2024-04-18 RX ORDER — FENTANYL CITRATE 50 UG/ML
INJECTION, SOLUTION INTRAMUSCULAR; INTRAVENOUS CONTINUOUS PRN
Status: DISCONTINUED | OUTPATIENT
Start: 2024-04-18 | End: 2024-04-18

## 2024-04-18 RX ORDER — PHENYLEPHRINE HYDROCHLORIDE 10 MG/ML
INJECTION INTRAVENOUS
Status: DISPENSED
Start: 2024-04-18 | End: 2024-04-18

## 2024-04-18 RX ORDER — HYDROMORPHONE HYDROCHLORIDE 1 MG/ML
0.2 INJECTION, SOLUTION INTRAMUSCULAR; INTRAVENOUS; SUBCUTANEOUS ONCE
Status: COMPLETED | OUTPATIENT
Start: 2024-04-18 | End: 2024-04-18

## 2024-04-18 RX ORDER — ALBUMIN HUMAN 250 G/1000ML
25 SOLUTION INTRAVENOUS EVERY 6 HOURS
Status: DISCONTINUED | OUTPATIENT
Start: 2024-04-18 | End: 2024-04-19

## 2024-04-18 RX ORDER — PROPOFOL 10 MG/ML
INJECTION, EMULSION INTRAVENOUS CONTINUOUS PRN
Status: DISCONTINUED | OUTPATIENT
Start: 2024-04-18 | End: 2024-04-18

## 2024-04-18 RX ORDER — ALBUMIN HUMAN 50 G/1000ML
SOLUTION INTRAVENOUS
Status: COMPLETED
Start: 2024-04-18 | End: 2024-04-18

## 2024-04-18 RX ORDER — ALBUMIN HUMAN 250 G/1000ML
12.5 SOLUTION INTRAVENOUS ONCE
Status: COMPLETED | OUTPATIENT
Start: 2024-04-18 | End: 2024-04-18

## 2024-04-18 RX ORDER — PHENYLEPHRINE HCL IN 0.9% NACL 0.4MG/10ML
SYRINGE (ML) INTRAVENOUS AS NEEDED
Status: DISCONTINUED | OUTPATIENT
Start: 2024-04-18 | End: 2024-04-18

## 2024-04-18 RX ORDER — NEOSTIGMINE METHYLSULFATE 5 MG/5 ML
SYRINGE (ML) INTRAVENOUS AS NEEDED
Status: DISCONTINUED | OUTPATIENT
Start: 2024-04-18 | End: 2024-04-18

## 2024-04-18 RX ORDER — ROCURONIUM BROMIDE 10 MG/ML
INJECTION, SOLUTION INTRAVENOUS AS NEEDED
Status: DISCONTINUED | OUTPATIENT
Start: 2024-04-18 | End: 2024-04-18

## 2024-04-18 RX ORDER — ALBUMIN HUMAN 50 G/1000ML
25 SOLUTION INTRAVENOUS ONCE
Status: COMPLETED | OUTPATIENT
Start: 2024-04-18 | End: 2024-04-18

## 2024-04-18 RX ORDER — HEPARIN SODIUM 1000 [USP'U]/ML
1000 INJECTION, SOLUTION INTRAVENOUS; SUBCUTANEOUS AS NEEDED
Status: DISCONTINUED | OUTPATIENT
Start: 2024-04-18 | End: 2024-04-27 | Stop reason: HOSPADM

## 2024-04-18 RX ORDER — CALCIUM GLUCONATE 98 MG/ML
1 INJECTION, SOLUTION INTRAVENOUS ONCE
Status: COMPLETED | OUTPATIENT
Start: 2024-04-18 | End: 2024-04-18

## 2024-04-18 RX ORDER — PHENYLEPHRINE 10 MG/250 ML(40 MCG/ML)IN 0.9 % SOD.CHLORIDE INTRAVENOUS
CONTINUOUS PRN
Status: DISCONTINUED | OUTPATIENT
Start: 2024-04-18 | End: 2024-04-18

## 2024-04-18 RX ORDER — INSULIN GLARGINE 100 [IU]/ML
12 INJECTION, SOLUTION SUBCUTANEOUS EVERY 12 HOURS
Status: DISCONTINUED | OUTPATIENT
Start: 2024-04-18 | End: 2024-04-26

## 2024-04-18 RX ORDER — PANTOPRAZOLE SODIUM 40 MG/10ML
40 INJECTION, POWDER, LYOPHILIZED, FOR SOLUTION INTRAVENOUS 2 TIMES DAILY
Status: DISCONTINUED | OUTPATIENT
Start: 2024-04-18 | End: 2024-04-20

## 2024-04-18 RX ORDER — MIDAZOLAM HYDROCHLORIDE 1 MG/ML
INJECTION INTRAMUSCULAR; INTRAVENOUS CONTINUOUS PRN
Status: DISCONTINUED | OUTPATIENT
Start: 2024-04-18 | End: 2024-04-18

## 2024-04-18 RX ORDER — HYDROMORPHONE HYDROCHLORIDE 1 MG/ML
INJECTION, SOLUTION INTRAMUSCULAR; INTRAVENOUS; SUBCUTANEOUS
Status: COMPLETED
Start: 2024-04-18 | End: 2024-04-18

## 2024-04-18 RX ADMIN — ROCURONIUM 50 MG: 50 INJECTION, SOLUTION INTRAVENOUS at 19:47

## 2024-04-18 RX ADMIN — HYDROCORTISONE SODIUM SUCCINATE 50 MG: 100 INJECTION, POWDER, FOR SOLUTION INTRAMUSCULAR; INTRAVENOUS at 18:45

## 2024-04-18 RX ADMIN — Medication 0.5 MCG/KG/MIN: at 02:00

## 2024-04-18 RX ADMIN — FLUDROCORTISONE ACETATE 0.1 MG: 0.1 TABLET ORAL at 03:33

## 2024-04-18 RX ADMIN — HYDROMORPHONE HYDROCHLORIDE 0.2 MG: 1 INJECTION, SOLUTION INTRAMUSCULAR; INTRAVENOUS; SUBCUTANEOUS at 14:36

## 2024-04-18 RX ADMIN — ROCURONIUM 20 MG: 50 INJECTION, SOLUTION INTRAVENOUS at 20:50

## 2024-04-18 RX ADMIN — ALBUMIN HUMAN 25 G: 0.25 SOLUTION INTRAVENOUS at 01:15

## 2024-04-18 RX ADMIN — CALCIUM GLUCONATE 1 G: 98 INJECTION, SOLUTION INTRAVENOUS at 16:47

## 2024-04-18 RX ADMIN — INSULIN LISPRO 4 UNITS: 100 INJECTION, SOLUTION INTRAVENOUS; SUBCUTANEOUS at 08:17

## 2024-04-18 RX ADMIN — SODIUM BICARBONATE: 650 TABLET ORAL at 10:37

## 2024-04-18 RX ADMIN — Medication 120 MCG: at 19:55

## 2024-04-18 RX ADMIN — ALBUMIN HUMAN 12.5 G: 0.25 SOLUTION INTRAVENOUS at 02:00

## 2024-04-18 RX ADMIN — HYDROCORTISONE SODIUM SUCCINATE 50 MG: 100 INJECTION, POWDER, FOR SOLUTION INTRAMUSCULAR; INTRAVENOUS at 03:33

## 2024-04-18 RX ADMIN — INSULIN GLARGINE 25 UNITS: 100 INJECTION, SOLUTION SUBCUTANEOUS at 08:01

## 2024-04-18 RX ADMIN — MIDODRINE HYDROCHLORIDE 10 MG: 10 TABLET ORAL at 09:53

## 2024-04-18 RX ADMIN — Medication 0.6 MCG/KG/MIN: at 20:29

## 2024-04-18 RX ADMIN — CALCIUM CHLORIDE, MAGNESIUM CHLORIDE, DEXTROSE MONOHYDRATE, LACTIC ACID, SODIUM CHLORIDE, SODIUM BICARBONATE AND POTASSIUM CHLORIDE 3000 ML/HR: 3.68; 3.05; 22; 5.4; 6.46; 3.09; .314 INJECTION INTRAVENOUS at 16:00

## 2024-04-18 RX ADMIN — OXYCODONE HYDROCHLORIDE 10 MG: 5 TABLET ORAL at 18:00

## 2024-04-18 RX ADMIN — ROCURONIUM 20 MG: 50 INJECTION, SOLUTION INTRAVENOUS at 20:28

## 2024-04-18 RX ADMIN — Medication 1 G: at 20:00

## 2024-04-18 RX ADMIN — ALBUMIN HUMAN 25 G: 0.05 INJECTION, SOLUTION INTRAVENOUS at 02:00

## 2024-04-18 RX ADMIN — ALBUMIN HUMAN 25 G: 0.25 SOLUTION INTRAVENOUS at 14:37

## 2024-04-18 RX ADMIN — HYDROCORTISONE SODIUM SUCCINATE 50 MG: 100 INJECTION, POWDER, FOR SOLUTION INTRAMUSCULAR; INTRAVENOUS at 09:53

## 2024-04-18 RX ADMIN — MIDODRINE HYDROCHLORIDE 10 MG: 10 TABLET ORAL at 00:53

## 2024-04-18 RX ADMIN — ALBUMIN HUMAN 25 G: 0.25 SOLUTION INTRAVENOUS at 09:19

## 2024-04-18 RX ADMIN — Medication: at 20:00

## 2024-04-18 RX ADMIN — PANTOPRAZOLE SODIUM 40 MG: 40 INJECTION, POWDER, FOR SOLUTION INTRAVENOUS at 08:01

## 2024-04-18 RX ADMIN — INSULIN LISPRO 4 UNITS: 100 INJECTION, SOLUTION INTRAVENOUS; SUBCUTANEOUS at 00:05

## 2024-04-18 RX ADMIN — ALBUMIN HUMAN 25 G: 50 SOLUTION INTRAVENOUS at 02:00

## 2024-04-18 RX ADMIN — Medication 120 MCG: at 20:23

## 2024-04-18 RX ADMIN — Medication 3 MG: at 22:37

## 2024-04-18 ASSESSMENT — PAIN - FUNCTIONAL ASSESSMENT
PAIN_FUNCTIONAL_ASSESSMENT: 0-10

## 2024-04-18 ASSESSMENT — PAIN SCALES - GENERAL
PAINLEVEL_OUTOF10: 0 - NO PAIN
PAINLEVEL_OUTOF10: 3
PAINLEVEL_OUTOF10: 0 - NO PAIN
PAIN_LEVEL: 0
PAINLEVEL_OUTOF10: 0 - NO PAIN
PAINLEVEL_OUTOF10: 9

## 2024-04-18 NOTE — PROCEDURES
Central Line    Date/Time: 4/18/2024 3:26 PM    Performed by: Danilo Torres MD  Authorized by: Danilo Torres MD    Consent:     Consent given by:  Spouse    Risks, benefits, and alternatives were discussed: yes      Risks discussed:  Bleeding, infection, pneumothorax and nerve damage  Universal protocol:     Patient identity confirmed:  Arm band and hospital-assigned identification number  Pre-procedure details:     Indication(s): central venous access      Hand hygiene: Hand hygiene performed prior to insertion      Sterile barrier technique: All elements of maximal sterile technique followed      Skin preparation:  Chlorhexidine    Skin preparation agent: Skin preparation agent completely dried prior to procedure    Sedation:     Sedation type:  None  Anesthesia:     Anesthesia method:  Local infiltration    Local anesthetic:  Lidocaine 1% w/o epi  Procedure details:     Location:  L internal jugular    Patient position:  Trendelenburg    Procedural supplies:  Triple lumen (trialysis CVC)    Ultrasound guidance: yes      Ultrasound guidance timing: real time      Sterile ultrasound techniques: Sterile gel and sterile probe covers were used      Number of attempts:  1    Successful placement: yes    Post-procedure details:     Post-procedure:  Line sutured and dressing applied    Assessment:  Blood return through all ports, no pneumothorax on x-ray and placement verified by x-ray    Procedure completion:  Tolerated    Complications:  Patient tolerated well with no complications. HD stable throughout procedure.  Comments:      Patient placed on 100 FiO2 on the ventilator during the procedure. 5 lead ECG, invasive arterial blood pressure monitoring and audible pulse ox where monitored during procedure.

## 2024-04-18 NOTE — SIGNIFICANT EVENT
Jason Hollins 63 y.o. 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 Hawkins and Dr. Smith on 3/5/24. Postoperative course c/b arrest requiring CPR with ROSC after TPA for assumed large PE on 3/20. Increased swelling at flap site appreciated overnight 3/20-3/21 and pt returned to OR for exploration of R flap site, evacuation of hematoma, suture ligation of multiple bleeding vessel sites in gluteal area and wound closure with Dr. Smith on 3/21. Pt has remained in ICU for continued critical care evaluation and management postoperatively. Returned to OR 4/11 with plastic surgery for R posterior thigh/gluteal region I&D with tissue advancement/complex closure and application of incisional wound vac.     0130: Notified by Trauma, acute bleeding noted to right thigh ANDERSON drains #1 and #2. Heparin gtt stopped immediately when noted to be bleeding, episode of Hypotension and now on Clint gtt.     Subjective:   Resting comfortably in bed, trach to vent, nods heads to questions and following simple commands. Assessed at bedside.    Objective:   Physical Exam:   Constitutional: Alert, lying in pressure relieving bed in ICU, critically ill appearing  ENMT: MMM, dobhoff in R nare, + Icterus  Cardiovascular: RRR on telemetry monitor.  Respiratory/Thorax: Trach to vent.  Gastrointestinal: Abdomen soft, protuberant.   Musculoskeletal: Able to squeeze bilateral hands, wiggles toes on the left, minimal movement on RLE, effort to move foot observed.   Extremities: Generalized pitting edema. Right thigh edematous, but soft and compressible, no bruising or tissue induration noted. Incision well approximated and healing with dried scabs along incision line. Visible portion of flap recipient site and adjacent anterior R thigh incisions appear stable.  Posterior flap incision line and posterior thigh/gluteal region incision dressed with incisional wound vac, maintaining low continuous suction at -75mmHg, no alarms for leak, obstruction or malfunction, expectantly no output in collecting canister. ANDERSON drain x 3 right upper leg, #1 with porter sanguinous output, #2 with porter sanguinous output, #3 with ss outputs  Neurological: Off sedation, alert and able to respond to commands with head nod, tracks with eyes.  Skin: Edematous, jaundice, warm. flap warm to touch, soft and compressible.     A/P:  - monitor flap site/right thigh closely for worsening firmness/bleeding  - monitor ANDERSON drain output and record  - stop tube feeds and keep NPO in case of need to return to OR  - transfuse per TSICU  - all other care per TSICU, appreciate care provided  - Updated PHILLIP Monzon-CNP  Plastic and Reconstructive Surgery   Available via [employer]  Pager #[phone]  Team phone: [phone]       05:30- Flap reassessed and more firm, slightly compressible, repeat labs this am, per Dr. Smith ok to restart tube feeds, updated Dr. Smith.

## 2024-04-18 NOTE — CONSULTS
"Nutrition Follow Up Assessment:   Nutrition Assessment    Reason for Assessment: Dietitian discretion (follow up)    Patient is a 63 y.o. male presenting with soft tissue sarcoma. Pt is on vent via trach. Pt has NG tube placed.    Nutrition History:  Food and Nutrient History: Met pt at bedside. Tube feeding was stopped on 4/11 due to going to the OR, and it has not been restarted since.  Food Allergies/Intolerances:  None  GI Symptoms: Diarrhea       Anthropometrics:  Height: 177.8 cm (5' 10\")   Weight: 119 kg (262 lb 12.6 oz)   BMI (Calculated): 39.51  IBW/kg (Dietitian Calculated): 75.5 kg  Percent of IBW: 165 %       Weight History:   Wt Readings from Last 5 Encounters:   04/17/24 119 kg (262 lb 12.6 oz)   02/28/24 120 kg (264 lb)   02/28/24 120 kg (264 lb 1.6 oz)   02/26/24 119 kg (262 lb 12.8 oz)   02/07/24 118 kg (261 lb)      Weight Change %:   Weight fluctuations over the past couple months which is likely due to fluid    Nutrition Focused Physical Exam Findings:  defer: refer to  Edema:  Edema: +2 mild  Edema Location: generalized  Physical Findings:  Skin: Positive (incisions (throat, right buttocks))    Nutrition Significant Labs:  BG POCT trend:   Results from last 7 days   Lab Units 04/18/24  0808 04/18/24  0450 04/18/24  0004 04/17/24  2010 04/17/24  1514   POCT GLUCOSE mg/dL 165* 154* 174* 218* 165*    , Renal Lab Trend:   Results from last 7 days   Lab Units 04/18/24  0610 04/17/24  2359 04/17/24  0100 04/16/24  0233   POTASSIUM mmol/L 5.2 4.9 4.3 4.2   PHOSPHORUS mg/dL 5.4* 4.7 3.5 3.0   SODIUM mmol/L 137 136 136 134*   MAGNESIUM mg/dL  --  2.40 2.28 2.19   EGFR mL/min/1.73m*2 23* 23* 28* 39*   BUN mg/dL 103* 101* 79* 54*   CREATININE mg/dL 2.99* 2.95* 2.52* 1.90*        Nutrition Specific Medications:  Scheduled medications  insulin glargine, 12 Units, subcutaneous, q12h  insulin lispro, 0-20 Units, subcutaneous, q4h  pancrelipase (Lip-Prot-Amyl) 2 tablet, sodium bicarbonate 650 mg, , nasogastric " tube, Once  pantoprazole, 40 mg, intravenous, BID    Continuous medications  [Held by provider] heparin, 0-4,500 Units/hr, Last Rate: Stopped (04/18/24 0229)  lactated Ringer's, 5 mL/hr, Last Rate: 5 mL/hr (04/16/24 1200)  phenylephrine, 0.1-1 mcg/kg/min (Dosing Weight), Last Rate: 0.5 mcg/kg/min (04/18/24 0200)      I/O:   Last BM Date: 04/17/24; Stool Appearance: Loose, Watery (04/17/24 1600)    Dietary Orders (From admission, onward)       Start     Ordered    04/17/24 1326  Oral nutritional supplements  Until discontinued        Question Answer Comment   Deliver with Breakfast    Deliver with Lunch    Deliver with Dinner    Select supplement: Nutrasource Fiber        04/17/24 1326    04/12/24 1046  Oral nutritional supplements  Until discontinued        Comments: Mix with at least 30-60ml water. Flush enteral feeding tube with at least 30-60ml after administration   Question Answer Comment   Deliver with Breakfast    Deliver with Lunch    Deliver with Dinner    Select supplement: Nutrasource Fiber        04/12/24 1045    04/05/24 1041  Oral nutritional supplements  Until discontinued        Comments: Flush with 30ml before and after admin   Question Answer Comment   Deliver with Dinner    Select supplement: Pro-Stat [facility]        04/05/24 1041                     Estimated Needs:   Total Energy Estimated Needs (kCal):  (8639-7941)  Method for Estimating Needs: 11-14 kcal/kg x 119 kg  Total Protein Estimated Needs (g):  (113-151)  Method for Estimating Needs: 1.5-2 g/kg  Total Fluid Estimated Needs (mL):  (per SICU)         Nutrition Diagnosis   Malnutrition Diagnosis  Patient has Malnutrition Diagnosis: Yes  Diagnosis Status: Ongoing  Malnutrition Diagnosis: Moderate malnutrition related to acute disease or injury  As Evidenced by: <50% of estimated energy requirements for >5days (no intake x 6 days), +2 edema, current critically ill state.  Additional Assessment Information: Pt's feeds have not been resumed since  4/11.        Nutrition Interventions/Recommendations         Nutrition Prescription:  Individualized Nutrition Prescription Provided for : enteral feeds        Nutrition Interventions:    Please restart Pivot 1.5 + 1 pkt of Prostat @ 15 ml/hr. Advance by 10 ml q 12 hours until reaching goal feeds of 45 ml/hr  Goal feeds regimen provides 1720 kcals, 118 g protein, 972 ml free water     If diarrhea continues, please add 2-3 packets of Nutrisource Fiber    Replete electrolytes as needed      Nutrition Monitoring and Evaluation   Food/Nutrient Related History Monitoring  Monitoring and Evaluation Plan: Enteral and parenteral nutrition intake  Criteria: restart tube feed     Biochemical Data, Medical Tests and Procedures  Monitoring and Evaluation Plan: Electrolyte/renal panel, Glucose/endocrine profile  Criteria: WNL      Time Spent/Follow-up Reminder:   Time Spent (min): 45 minutes  Last Date of Nutrition Visit: 04/18/24  Nutrition Follow-Up Needed?: Dietitian to reassess per policy  Follow up Comment: MIKAELA trach + vent

## 2024-04-18 NOTE — PROGRESS NOTES
Jason Hollins is a 63 y.o. male on day 44 of admission presenting with Soft tissue sarcoma (Multi).    Subjective   Interval History: Remains intubated and sedated. Worsening volume of bloody output from ANDERSON drains today, planning return to OR.         Review of Systems unable to obtain d/t mental status    Objective   Range of Vitals (last 24 hours)  Heart Rate:  []   Temp:  [36.1 °C (97 °F)-36.6 °C (97.9 °F)]   Resp:  [0-42]   BP: (105-161)/(45-83)   Weight:  [119 kg (262 lb 5.6 oz)]   SpO2:  [91 %-100 %]   Daily Weight  04/18/24 : 119 kg (262 lb 5.6 oz)    Body mass index is 37.64 kg/m².    Physical Exam  Limited as patient undergoing critical care by team  Obese and sedated and intubated  R leg incision without clear breakdown  3 ANDERSON drains seen, very bloody (not very serous), full and per team had been emptied within the last hour or so    Relevant Results  Labs  Results from last 72 hours   Lab Units 04/18/24  1433 04/18/24  1211 04/18/24  1037   WBC AUTO x10*3/uL 5.7 7.0 6.9   HEMOGLOBIN g/dL 8.4* 7.2* 7.1*   HEMATOCRIT % 22.7* 19.8* 20.7*   PLATELETS AUTO x10*3/uL 122* 114* 130*     Results from last 72 hours   Lab Units 04/18/24  0610 04/17/24 2359 04/17/24  0100   SODIUM mmol/L 137 136 136   POTASSIUM mmol/L 5.2 4.9 4.3   CHLORIDE mmol/L 98 97* 97*   CO2 mmol/L 23 23 24   BUN mg/dL 103* 101* 79*   CREATININE mg/dL 2.99* 2.95* 2.52*   GLUCOSE mg/dL 151* 172* 227*   CALCIUM mg/dL 8.5* 8.5* 9.1   ANION GAP mmol/L 21* 21* 19   EGFR mL/min/1.73m*2 23* 23* 28*   PHOSPHORUS mg/dL 5.4* 4.7 3.5     Results from last 72 hours   Lab Units 04/18/24  0610 04/17/24 2359 04/17/24  0100 04/16/24  0233 04/15/24  2108   ALK PHOS U/L  --  185*  --  157* 180*   BILIRUBIN TOTAL mg/dL  --  23.9*  --  25.2* 25.8*   BILIRUBIN DIRECT mg/dL  --  16.5*  --  17.7* 18.0*   PROTEIN TOTAL g/dL  --  5.1*  --  5.2* 5.2*   ALT U/L  --  5*  --  5* 4*   AST U/L  --  98*  --  65* 70*   ALBUMIN g/dL 2.9* 3.1* 3.6 3.5 3.2*     Estimated  "Creatinine Clearance: 33.1 mL/min (A) (by C-G formula based on SCr of 2.99 mg/dL (H)).  No results found for: \"CRP\"    Microbiology  Susceptibility data from last 14 days.  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 Course subsequently included PEA arrest in setting of PE 3/20, hemorrhagic shock from R thigh wound bleeding, trach 4/1 with trach revision 4/4 in setting of bleeding.     He developed a K. Aerogenes bacteremia in setting of VAP, for which he was planned for 10 days of therapy. His procedure was delayed until today, and in the setting of recent and now resolved hypotension, we were contacted regarding further management. Depending on depth of wound and results of any obtained cultures, we can finalize a course.     04/14/2024 Update:       4/3/2024 BAL growing Klebsiella aerogenes       4/3/2024 blood growing  Klebsiella aerogenes       4/11/2024 surgical specimen from gluteal closure and reconstruction growing Klebsiella aerogenes.          I reviewed susceptibility testing patterns for all 3 isolates.  Some subtle differences but suspect these are all similar reflecting patient's colonization status with Klebsiella originates.  On 4/11/2024 patient had tissue advancement and closure of complex right leg and buttock wound.  Large area of necrotic tissue was removed and culture positive for Klebsiella aerogenes.       Therefore would extend meropenem " treatment course an additional 7 days while monitoring for postoperative healing or complications.           THUS new stop date tentatively set in 7 days would be 4/21/2024.  At that time would need to reassess and consider stopping or extending further depending on clinical status and wound healing.     04/18/2024 Update: worsening bleeding from surgical site in R leg, OR takeback today. Continue meropenem and will follow along.     Recommendations:  1.  Continue meropenem            Prior 2 week stop date was 4/14/24 and based on 4/3/2024 bacteremia            Extending meropenem treatment given presence of some necrotic tissue in the gluteal surgical site.              Tissue was excised and closure performed on 4/11/2024.                 Thus will extend treatment through 4/21/2024 AT MINIMUM - will await OR findings prior to making final decision        Discussed with primary team and Dr Diego.  ID will follow.     Debbie Adam MD  PGY-5 ID Fellow  Team A - pager [phone]  For new consults, page [phone]

## 2024-04-18 NOTE — PROGRESS NOTES
Jason Hollins is a 63 y.o. male on day 44 of admission presenting with Soft tissue sarcoma (Multi).      Subjective   4/18: Pt seen resting in bed. He looks weak. Follows commands. FiO2 40% to vent via trach. BP good. UO 925mL in 24h. He did receive two doses of IV lasix yesterday. Having increased bleeding in drain from RLE wound.    Objective          Vitals 24HR  Heart Rate:  []   Temp:  [36.1 °C (97 °F)-36.6 °C (97.9 °F)]   Resp:  [0-42]   BP: (105-181)/(47-98)   SpO2:  [91 %-100 %]         Intake/Output last 3 Shifts:    Intake/Output Summary (Last 24 hours) at 4/18/2024 1613  Last data filed at 4/18/2024 1400  Gross per 24 hour   Intake 4775.67 ml   Output 2532.5 ml   Net 2243.17 ml       Physical Exam  General appearance: intubated, trached  Neck: trach  Heart: RRR  Lungs: FiO2 40% on vent via trach  : no Marino  Extremities: 1+ b/l leg edema; RLE drains - bloody  Neuro: alert, follows commands  Dialysis ACCESS:  none    Current Facility-Administered Medications:     acetaminophen (Tylenol) oral liquid 650 mg, 650 mg, nasogastric tube, q6h PRN, Alesha Mckeon PA-C    albumin human 25 % solution 25 g, 25 g, intravenous, q6h, Alesha Mckeon PA-C, Stopped at 04/18/24 1507    alteplase (Cathflo Activase) injection 2 mg, 2 mg, intra-catheter, PRN, Mariluz Dailey MD    calcium gluconate 100 mg/mL (10%) injection 1 g, 1 g, intravenous, Once, Mariluz Dailey MD    calcium gluconate in NS IV 1 g, 1 g, intravenous, q6h PRN, Mariluz Dailey MD, Stopped at 04/14/24 0458    calcium gluconate in NS IV 2 g, 2 g, intravenous, q6h PRN, Mariluz Dailey MD, Last Rate: 100 mL/hr at 04/04/24 1118, 2 g at 04/04/24 1118    dextrose 50 % injection 12.5 g, 12.5 g, intravenous, q15 min PRN, Mariluz Dailey MD    fludrocortisone (Florinef) tablet 0.1 mg, 0.1 mg, nasogastric tube, q12h, Mariluz Dailey MD, 0.1 mg at 04/18/24 0333    glucagon (Glucagen) injection 1 mg, 1 mg, intramuscular, q15 min PRN, Mariluz Dailey MD    heparin 1,000  unit/mL injection 1,000 Units, 1,000 Units, intra-catheter, PRN, Mary Duffy MD    heparin 1,000 unit/mL injection 1,000 Units, 1,000 Units, intra-catheter, PRN, Mary Duffy MD    [Held by provider] heparin 25,000 Units in dextrose 5% 250 mL (100 Units/mL) infusion (premix), 0-4,500 Units/hr, intravenous, Continuous, Gordon Nieves MD, Stopped at 04/18/24 0229    hydrocortisone sod succ (PF) (Solu-CORTEF) injection 50 mg, 50 mg, intravenous, q8h, Mariluz Dailey MD, 50 mg at 04/18/24 0953    insulin glargine (Lantus) injection 12 Units, 12 Units, subcutaneous, q12h, Mariluz Dailey MD    insulin lispro (HumaLOG) injection 0-20 Units, 0-20 Units, subcutaneous, q4h, Mariluz Dailey MD, 4 Units at 04/18/24 0817    labetaloL (Normodyne,Trandate) injection 10 mg, 10 mg, intravenous, q6h PRN, Mariluz Dailey MD    lactated Ringer's infusion, 5 mL/hr, intravenous, Continuous, Mariluz Dailey MD, Last Rate: 5 mL/hr at 04/16/24 1200, 5 mL/hr at 04/16/24 1200    magnesium sulfate IV 2 g, 2 g, intravenous, q6h PRN, Mariluz Dailey MD, Stopped at 03/24/24 1726    magnesium sulfate IV 4 g, 4 g, intravenous, q6h PRN, Mariluz Dailey MD, Stopped at 04/05/24 0838    meropenem (Merrem) in sodium chloride 0.9 % 100 mL IV 1 g, 1 g, intravenous, q24h, Gordon Nieves MD, Stopped at 04/17/24 2048    midodrine (Proamatine) tablet 10 mg, 10 mg, oral, Daily PRN, Alesha Mckeon PA-C, 10 mg at 04/18/24 0053    midodrine (Proamatine) tablet 10 mg, 10 mg, orogastric tube, q8h, Alesha Mckeon PA-C, 10 mg at 04/18/24 0953    oxyCODONE (Roxicodone) immediate release tablet 10 mg, 10 mg, nasogastric tube, q4h PRN, Alesha Mckeon PA-C    oxyCODONE (Roxicodone) immediate release tablet 5 mg, 5 mg, nasogastric tube, q4h PRN, Alesha Mckeon PA-C    oxygen (O2) therapy, , inhalation, Continuous - Inhalation, Mariluz Dailey MD, Rate Verify at 04/18/24 1447    pantoprazole (ProtoNix) injection 40 mg, 40 mg, intravenous, BID, Mariluz Dailey MD, 40 mg at  04/18/24 0801    phenylephrine (Clint-Synephrine) 10 mg in sodium chloride 0.9% 250 mL (0.04 mg/mL) infusion - compounded, 0.1-1 mcg/kg/min (Dosing Weight), intravenous, Continuous, Chastity Godinez MD, Last Rate: 75.8 mL/hr at 04/18/24 0200, 0.5 mcg/kg/min at 04/18/24 0200    PrismaSol 4/2.5 CRRT solution, 3,000 mL/hr, CRRT, Continuous, Mary Duffy MD    sodium chloride (Ocean) 0.65 % nasal spray 1 spray, 1 spray, Each Nostril, 4x daily PRN, Mariluz Dailey MD      Assessment/Plan   Jason Hollins is a 63 y.o. 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 Hawkins and Dr. Smith on 3/5/24. On 3/20, he developed massive PE and was given TPA, taken to OR in setting of increased swelling at flap site- hematoma for exploration and evacuation. Nephrology following for RUTH.    #Acute Kidney Injury on Dialysis (RUTH-D), oliguric?  - etiology hemodynamic from hemorrhagic shock  - began on CVVH since 3/21 via R I J trialysis and transitioned to SLED on 4/2, then iHD on 4/15  - Cr baseline 0.6; Cr trend 2.5 - 2.9  - electrolytes: K 5.2 and HCO3 23   - UO 925mL in 24h; prior was 230mL;  I/Os net +0.7L  - FiO2 40% on - trach  - BP stable - no pressor  - s/p SLED 4/13 during day with 2L fluid removed  - s/p HD 4/15 with 1.8L UF  - trialysis cath removed    RLE wound s/p debridement  - drop in Hb with more blood in drain; went to OR today for Plastic Surg management    Myxoid Chondrosarcoma s/p wide resection 3/5/2024    Plan  - UO was improving with lasix, however he was becoming more anemic with blood loss so pt went to OR today for RLE revision; post-op is hypotensive and volume overloaded; thus ordered CVVH; pt has new L I J trialysis  - monitor I/Os; continue bladder scan once per shift to monitor for renal recovery    D/w   Anatoly Duffy MD  Nephrology Fellow PGY 5  Contact via secure chat  Nephrology Pager # [phone] ([phone])

## 2024-04-18 NOTE — ANESTHESIA PREPROCEDURE EVALUATION
Patient: Jason Hollins    Procedure Information       Anesthesia Start Date/Time: 04/18/24 1870    Procedures:       Incision and Drainage Hip (Right)      Evacuation Hematoma Hip (Right)    Location: [facility] OR 17 / [facility]    Surgeons: Angy Smith MD            Relevant Problems   Anesthesia (within normal limits)      Cardiac   (+) Cardiopulmonary arrest (Multi)   (+) Hyperlipidemia      Pulmonary   (+) Pulmonary embolus (Multi)      GI   (+) Gastroesophageal reflux disease without esophagitis      /Renal   (+) Acute kidney injury (nontraumatic) (CMS-HCC)      Endocrine   (+) Class 2 obesity with body mass index (BMI) of 38.0 to 38.9 in adult   (+) Current chronic use of systemic steroids   (+) DM type 2 without retinopathy (Multi)   (+) Diabetes mellitus type 2 in obese   (+) Severe obesity (BMI 35.0-39.9) with comorbidity (Multi)   (+) Type 2 diabetes mellitus with hyperglycemia (Multi)      Hematology   (+) Anemia   (+) Thrombocytopenia (CMS-HCC)      ID   (+) Candidal intertrigo   (+) Viral illness       Clinical information reviewed:   Tobacco  Allergies  Meds  Problems  Med Hx  Surg Hx   Fam Hx  Soc   Hx        NPO Detail:  No data recorded     Physical Exam    Airway  Mallampati: unable to assess     Cardiovascular - normal exam     Dental    Pulmonary   (+) decreased breath sounds     Abdominal          Anesthesia Plan    History of general anesthesia?: yes  History of complications of general anesthesia?: no    ASA 4 - emergent     general     Anesthetic plan and risks discussed with patient and spouse.  Use of blood products discussed with spouse who.    Plan discussed with resident.

## 2024-04-18 NOTE — PROGRESS NOTES
Jason Hollins is a 63 y.o. male on day 44 of admission presenting with Soft tissue sarcoma (Multi).    Subjective   Overnight, patient hemoglobin was 7.1 with increased BP drain output from right lower extremity wound.  Transfused 2 units packed red blood cells and 2 FFP.  Posttransfusion hemoglobin 6.9, so patient was transfused another 2 units red blood cells and 2 FFP.  Subsequent hemoglobin 7.1 and then 7.2, so patient transfused another 2 packed red blood cells and 2 FFP.  Patient has remained hemodynamically stable and off pressors.    Discussed patient status with plastic surgery at the side.  Given that patient hemoglobin is not incrementing with resuscitation, plastic surgery team is planning on taking the patient back to the OR later this afternoon.    Trialysis line placed in left internal jugular vein for central access today.       Objective     Physical Exam  Constitutional:       General: He is not in acute distress.  HENT:      Head:      Comments: Dobbhoff tube in place.  No bleeding noted around trach site  Eyes:      Extraocular Movements: Extraocular movements intact.      Comments: Scleral icterus noted   Neck:      Comments: L internal jugular trialysis line in place  Cardiovascular:      Rate and Rhythm: Normal rate and regular rhythm.   Pulmonary:      Comments: Inspiratory crackles and diminished lung sounds auscultated bilaterally.  Abdominal:      General: Abdomen is flat. There is no distension.      Palpations: Abdomen is soft.   Musculoskeletal:      Comments: Right thigh is firm with significantly increased swelling. Increased drain output noted in all 3 ANDERSON drains with porter blood visible. Distal extremities are both warm and well perfused.   Skin:     General: Skin is warm.   Neurological:      General: No focal deficit present.      Mental Status: He is alert.         Last Recorded Vitals  Blood pressure 155/70, pulse 80, temperature 36.6 °C (97.9 °F), resp. rate 16, height 1.778 m (5'  "10\"), weight 119 kg (262 lb 12.6 oz), SpO2 100%.  Intake/Output last 3 Shifts:  I/O last 3 completed shifts:  In: 3617.7 (30.3 mL/kg) [I.V.:809.7 (6.8 mL/kg); Blood:908; NG/GT:1700; IV Piggyback:200]  Out: 2128.5 (17.9 mL/kg) [Urine:1186 (0.3 mL/kg/hr); Drains:942.5]  Weight: 119.2 kg     Relevant Results  Scheduled medications  albumin human, 25 g, intravenous, q6h  fludrocortisone, 0.1 mg, nasogastric tube, q12h  hydrocortisone sodium succinate, 50 mg, intravenous, q8h  insulin glargine, 12 Units, subcutaneous, q12h  insulin lispro, 0-20 Units, subcutaneous, q4h  meropenem, 1 g, intravenous, q24h  midodrine, 10 mg, orogastric tube, q8h  oxygen, , inhalation, Continuous - Inhalation  pantoprazole, 40 mg, intravenous, BID      Continuous medications  [Held by provider] heparin, 0-4,500 Units/hr, Last Rate: Stopped (04/18/24 0229)  lactated Ringer's, 5 mL/hr, Last Rate: 5 mL/hr (04/16/24 1200)  phenylephrine, 0.1-1 mcg/kg/min (Dosing Weight), Last Rate: 0.5 mcg/kg/min (04/18/24 0200)      PRN medications  PRN medications: acetaminophen, alteplase, calcium gluconate, calcium gluconate, dextrose, glucagon, labetaloL, magnesium sulfate, magnesium sulfate, midodrine, oxyCODONE, oxyCODONE, sodium chloride  Results for orders placed or performed during the hospital encounter of 03/05/24 (from the past 24 hour(s))   POCT GLUCOSE   Result Value Ref Range    POCT Glucose 165 (H) 74 - 99 mg/dL   POCT GLUCOSE   Result Value Ref Range    POCT Glucose 218 (H) 74 - 99 mg/dL   CBC   Result Value Ref Range    WBC 7.2 4.4 - 11.3 x10*3/uL    nRBC 0.7 (H) 0.0 - 0.0 /100 WBCs    RBC 2.49 (L) 4.50 - 5.90 x10*6/uL    Hemoglobin 7.1 (L) 13.5 - 17.5 g/dL    Hematocrit 19.2 (L) 41.0 - 52.0 %    MCV 77 (L) 80 - 100 fL    MCH 28.5 26.0 - 34.0 pg    MCHC 37.0 (H) 32.0 - 36.0 g/dL    RDW 21.6 (H) 11.5 - 14.5 %    Platelets 171 150 - 450 x10*3/uL   CALCIUM, IONIZED   Result Value Ref Range    POCT Calcium, Ionized 1.12 1.1 - 1.33 mmol/L "   Coagulation Screen   Result Value Ref Range    Protime 17.4 (H) 9.8 - 12.8 seconds    INR 1.5 (H) 0.9 - 1.1    aPTT 140 (HH) 27 - 38 seconds   Heparin Assay, UFH   Result Value Ref Range    Heparin Unfractionated 0.5 See Comment Below for Therapeutic Ranges IU/mL   Hepatic function panel   Result Value Ref Range    Albumin 3.1 (L) 3.4 - 5.0 g/dL    Bilirubin, Total 23.9 (H) 0.0 - 1.2 mg/dL    Bilirubin, Direct 16.5 (H) 0.0 - 0.3 mg/dL    Alkaline Phosphatase 185 (H) 33 - 136 U/L    ALT 5 (L) 10 - 52 U/L    AST 98 (H) 9 - 39 U/L    Total Protein 5.1 (L) 6.4 - 8.2 g/dL   Magnesium   Result Value Ref Range    Magnesium 2.40 1.60 - 2.40 mg/dL   Basic Metabolic Panel   Result Value Ref Range    Glucose 172 (H) 74 - 99 mg/dL    Sodium 136 136 - 145 mmol/L    Potassium 4.9 3.5 - 5.3 mmol/L    Chloride 97 (L) 98 - 107 mmol/L    Bicarbonate 23 21 - 32 mmol/L    Anion Gap 21 (H) 10 - 20 mmol/L    Urea Nitrogen 101 (HH) 6 - 23 mg/dL    Creatinine 2.95 (H) 0.50 - 1.30 mg/dL    eGFR 23 (L) >60 mL/min/1.73m*2    Calcium 8.5 (L) 8.6 - 10.6 mg/dL   Phosphorus   Result Value Ref Range    Phosphorus 4.7 2.5 - 4.9 mg/dL   Fibrinogen   Result Value Ref Range    Fibrinogen 171 (L) 200 - 400 mg/dL   POCT GLUCOSE   Result Value Ref Range    POCT Glucose 174 (H) 74 - 99 mg/dL   Blood Gas Arterial Full Panel   Result Value Ref Range    POCT pH, Arterial 7.44 (H) 7.38 - 7.42 pH    POCT pCO2, Arterial 32 (L) 38 - 42 mm Hg    POCT pO2, Arterial 76 (L) 85 - 95 mm Hg    POCT SO2, Arterial 99 94 - 100 %    POCT Oxy Hemoglobin, Arterial 95.1 94.0 - 98.0 %    POCT Hematocrit Calculated, Arterial 21.0 (L) 41.0 - 52.0 %    POCT Sodium, Arterial 133 (L) 136 - 145 mmol/L    POCT Potassium, Arterial 4.5 3.5 - 5.3 mmol/L    POCT Chloride, Arterial 100 98 - 107 mmol/L    POCT Ionized Calcium, Arterial 1.11 1.10 - 1.33 mmol/L    POCT Glucose, Arterial 157 (H) 74 - 99 mg/dL    POCT Lactate, Arterial 1.7 0.4 - 2.0 mmol/L    POCT Base Excess, Arterial -2.2  (L) -2.0 - 3.0 mmol/L    POCT HCO3 Calculated, Arterial 21.7 (L) 22.0 - 26.0 mmol/L    POCT Hemoglobin, Arterial 7.0 (L) 13.5 - 17.5 g/dL    POCT Anion Gap, Arterial 16 10 - 25 mmo/L    Patient Temperature 37.0 degrees Celsius    FiO2 40 %   Type and Screen   Result Value Ref Range    ABO TYPE O     Rh TYPE POS     ANTIBODY SCREEN NEG    Prepare RBC: 2 Units   Result Value Ref Range    PRODUCT CODE B8560J06     Unit Number [id number]     Unit ABO O     Unit RH POS     XM INTEP COMP     Dispense Status TR     Blood Expiration Date May 07, 2024 23:59 EDT     PRODUCT BLOOD TYPE 5100     UNIT VOLUME 350     PRODUCT CODE Y0182V97     Unit Number [id number]     Unit ABO O     Unit RH POS     XM INTEP COMP     Dispense Status TR     Blood Expiration Date May 04, 2024 23:59 EDT     PRODUCT BLOOD TYPE 5100     UNIT VOLUME 350    Prepare Plasma: 2 Units   Result Value Ref Range    PRODUCT CODE D3816A93     Unit Number [id number]     Unit ABO O     Unit RH POS     Dispense Status IS     Blood Expiration Date April 22, 2024 09:10 EDT     PRODUCT BLOOD TYPE 5100     UNIT VOLUME 208     PRODUCT CODE H3155M20     Unit Number [id number]     Unit ABO O     Unit RH POS     Dispense Status TR     Blood Expiration Date April 22, 2024 09:10 EDT     PRODUCT BLOOD TYPE 5100     UNIT VOLUME 221    POCT GLUCOSE   Result Value Ref Range    POCT Glucose 154 (H) 74 - 99 mg/dL   CBC   Result Value Ref Range    WBC 6.9 4.4 - 11.3 x10*3/uL    nRBC 0.6 (H) 0.0 - 0.0 /100 WBCs    RBC 2.42 (L) 4.50 - 5.90 x10*6/uL    Hemoglobin 6.9 (L) 13.5 - 17.5 g/dL    Hematocrit 18.5 (L) 41.0 - 52.0 %    MCV 76 (L) 80 - 100 fL    MCH 28.5 26.0 - 34.0 pg    MCHC 37.3 (H) 32.0 - 36.0 g/dL    RDW 19.8 (H) 11.5 - 14.5 %    Platelets 131 (L) 150 - 450 x10*3/uL   Renal Function Panel   Result Value Ref Range    Glucose 151 (H) 74 - 99 mg/dL    Sodium 137 136 - 145 mmol/L    Potassium 5.2 3.5 - 5.3 mmol/L    Chloride 98 98 - 107 mmol/L     Bicarbonate 23 21 - 32 mmol/L    Anion Gap 21 (H) 10 - 20 mmol/L    Urea Nitrogen 103 (HH) 6 - 23 mg/dL    Creatinine 2.99 (H) 0.50 - 1.30 mg/dL    eGFR 23 (L) >60 mL/min/1.73m*2    Calcium 8.5 (L) 8.6 - 10.6 mg/dL    Phosphorus 5.4 (H) 2.5 - 4.9 mg/dL    Albumin 2.9 (L) 3.4 - 5.0 g/dL   Prepare RBC: 4 Units   Result Value Ref Range    PRODUCT CODE Y1517F63     Unit Number [id number]     Unit ABO O     Unit RH POS     XM INTEP COMP     Dispense Status TR     Blood Expiration Date May 08, 2024 23:59 EDT     PRODUCT BLOOD TYPE 5100     UNIT VOLUME 350     PRODUCT CODE M6949J72     Unit Number [id number]     Unit ABO O     Unit RH POS     XM INTEP COMP     Dispense Status TR     Blood Expiration Date May 08, 2024 23:59 EDT     PRODUCT BLOOD TYPE 5100     UNIT VOLUME 350     PRODUCT CODE F1213T07     Unit Number [id number]     Unit ABO O     Unit RH POS     XM INTEP COMP     Dispense Status TR     Blood Expiration Date May 09, 2024 23:59 EDT     PRODUCT BLOOD TYPE 5100     UNIT VOLUME 350     PRODUCT CODE H9480Y23     Unit Number [id number]     Unit ABO O     Unit RH POS     XM INTEP COMP     Dispense Status TR     Blood Expiration Date May 12, 2024 23:59 EDT     PRODUCT BLOOD TYPE 5100     UNIT VOLUME 277    Prepare Plasma: 4 Units   Result Value Ref Range    PRODUCT CODE M0235B26     Unit Number [id number]     Unit ABO O     Unit RH POS     Dispense Status TR     Blood Expiration Date April 22, 2024 09:07 EDT     PRODUCT BLOOD TYPE 5100     UNIT VOLUME 306     PRODUCT CODE H7630A31     Unit Number [id number]     Unit ABO O     Unit RH POS     Dispense Status TR     Blood Expiration Date April 22, 2024 09:07 EDT     PRODUCT BLOOD TYPE 5100     UNIT VOLUME 326     PRODUCT CODE K8037G44     Unit Number [id number]     Unit ABO O     Unit RH POS     Dispense Status TR     Blood Expiration Date April 22, 2024 09:07 EDT     PRODUCT BLOOD TYPE 5100     UNIT VOLUME 314    Prepare  Cryoprecipitated AHF (Pooled Units): 1 Pools   Result Value Ref Range    PRODUCT CODE D6531U57     Unit Number [id number]     Unit ABO A     Unit RH POS     Dispense Status IS     Blood Expiration Date April 18, 2024 12:00 EDT     PRODUCT BLOOD TYPE 6200     UNIT VOLUME 69    Coagulation Screen   Result Value Ref Range    Protime 16.8 (H) 9.8 - 12.8 seconds    INR 1.5 (H) 0.9 - 1.1    aPTT 37 27 - 38 seconds   Blood Gas Arterial Full Panel   Result Value Ref Range    POCT pH, Arterial 7.47 (H) 7.38 - 7.42 pH    POCT pCO2, Arterial 31 (L) 38 - 42 mm Hg    POCT pO2, Arterial 84 (L) 85 - 95 mm Hg    POCT SO2, Arterial 99 94 - 100 %    POCT Oxy Hemoglobin, Arterial 95.2 94.0 - 98.0 %    POCT Hematocrit Calculated, Arterial 23.0 (L) 41.0 - 52.0 %    POCT Sodium, Arterial 132 (L) 136 - 145 mmol/L    POCT Potassium, Arterial 5.5 (H) 3.5 - 5.3 mmol/L    POCT Chloride, Arterial 101 98 - 107 mmol/L    POCT Ionized Calcium, Arterial 1.14 1.10 - 1.33 mmol/L    POCT Glucose, Arterial 172 (H) 74 - 99 mg/dL    POCT Lactate, Arterial 1.9 0.4 - 2.0 mmol/L    POCT Base Excess, Arterial -0.8 -2.0 - 3.0 mmol/L    POCT HCO3 Calculated, Arterial 22.6 22.0 - 26.0 mmol/L    POCT Hemoglobin, Arterial 7.6 (L) 13.5 - 17.5 g/dL    POCT Anion Gap, Arterial 14 10 - 25 mmo/L    Patient Temperature 37.0 degrees Celsius    FiO2 40 %   POCT GLUCOSE   Result Value Ref Range    POCT Glucose 165 (H) 74 - 99 mg/dL   TEG Clot Global Profile   Result Value Ref Range    R (Reaction Time) K 8.2 4.6 - 9.1 min    K (Clot Kinetics) 2.1 0.8 - 2.1 min    ANGLE 66.0 63.0 - 78.0 deg    MA (Max Amplitude) K 58.0 52.0 - 69.0 mm    R (Reaction Time) KH 9.0 (H) 4.3 - 8.3 min    MA (Max Amplitude) RT 57.0 52.0 - 70.0 mm    MA ( Ramiro Amplitude) FF 17.0 15.0 - 32.0 mm    FLEV 314 278 - 581 mg/dL   Prepare Platelets: 2 Units   Result Value Ref Range    PRODUCT CODE Y5368B77     Unit Number [id number]     Unit ABO A     Unit RH POS     Dispense Status IS     Blood  Expiration Date April 19, 2024 23:59 EDT     PRODUCT BLOOD TYPE 6200     UNIT VOLUME 366     PRODUCT CODE T7512Q04     Unit Number [id number]     Unit ABO A     Unit RH POS     Dispense Status IS     Blood Expiration Date April 19, 2024 23:59 EDT     PRODUCT BLOOD TYPE 6200     UNIT VOLUME 276    CBC   Result Value Ref Range    WBC 6.9 4.4 - 11.3 x10*3/uL    nRBC 0.6 (H) 0.0 - 0.0 /100 WBCs    RBC 2.54 (L) 4.50 - 5.90 x10*6/uL    Hemoglobin 7.1 (L) 13.5 - 17.5 g/dL    Hematocrit 20.7 (L) 41.0 - 52.0 %    MCV 82 80 - 100 fL    MCH 28.0 26.0 - 34.0 pg    MCHC 34.3 32.0 - 36.0 g/dL    RDW 20.8 (H) 11.5 - 14.5 %    Platelets 130 (L) 150 - 450 x10*3/uL   POCT GLUCOSE   Result Value Ref Range    POCT Glucose 132 (H) 74 - 99 mg/dL   CBC   Result Value Ref Range    WBC 7.0 4.4 - 11.3 x10*3/uL    nRBC 0.4 (H) 0.0 - 0.0 /100 WBCs    RBC 2.56 (L) 4.50 - 5.90 x10*6/uL    Hemoglobin 7.2 (L) 13.5 - 17.5 g/dL    Hematocrit 19.8 (L) 41.0 - 52.0 %    MCV 77 (L) 80 - 100 fL    MCH 28.1 26.0 - 34.0 pg    MCHC 36.4 (H) 32.0 - 36.0 g/dL    RDW 19.4 (H) 11.5 - 14.5 %    Platelets 114 (L) 150 - 450 x10*3/uL   Coagulation Screen   Result Value Ref Range    Protime 16.4 (H) 9.8 - 12.8 seconds    INR 1.5 (H) 0.9 - 1.1    aPTT 33 27 - 38 seconds   Fibrinogen   Result Value Ref Range    Fibrinogen 169 (L) 200 - 400 mg/dL   Prepare Plasma: 2 Units   Result Value Ref Range    PRODUCT CODE Y0129M59     Unit Number [id number]     Unit ABO O     Unit RH POS     Dispense Status IS     Blood Expiration Date April 22, 2024 09:07 EDT     PRODUCT BLOOD TYPE 5100     UNIT VOLUME 327     PRODUCT CODE Q3911K17     Unit Number [id number]     Unit ABO A     Unit RH POS     Dispense Status TR     Blood Expiration Date April 22, 2024 08:45 EDT     PRODUCT BLOOD TYPE 6200     UNIT VOLUME 209    Prepare RBC: 2 Units   Result Value Ref Range    PRODUCT CODE X5686T61     Unit Number [id number]     Unit ABO O     Unit RH POS     XM INTEP COMP      Dispense Status IS     Blood Expiration Date May 09, 2024 23:59 EDT     PRODUCT BLOOD TYPE 5100     UNIT VOLUME 350     PRODUCT CODE Q3780J13     Unit Number [id number]     Unit ABO O     Unit RH POS     XM INTEP COMP     Dispense Status IS     Blood Expiration Date May 08, 2024 23:59 EDT     PRODUCT BLOOD TYPE 5100     UNIT VOLUME 350                     Assessment/Plan   Principal Problem:    Soft tissue sarcoma (Multi)  Active Problems:    Acute kidney injury (nontraumatic) (CMS-HCC)    Pulmonary embolus (Multi)    Anemia    Thrombocytopenia (CMS-HCC)    Current chronic use of systemic steroids    Gastroesophageal reflux disease without esophagitis    Extraskeletal myxoid chondrosarcoma (Multi)    Cardiopulmonary arrest (Multi)    Acute respiratory failure with hypoxia (Multi)    Tracheostomy hemorrhage (Multi)    Postoperative wound breakdown, initial encounter    Postoperative wound breakdown, sequela    Hematoma    Jason Hollins is a 63 y.o. 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 Hawkins and Dr. Smith on 3/5/24.  Post operative course was uncomplicated and patient was on RNF until 3/20 for prolonged bed rest to avoid hip flexion to maintain flap integrity.  Patient was on prophylactic lovenox while on bedrest.      3/20: Cardiopulmonary arrest s/p CPR with ROSC after TPA, overnight started on heparin, epo, increased pressor requirements, increased swelling at flap site.      3/21: Hemorrhagic shock, MTP. Firm and large right flap site. Return to OR s/p Exploration of right thigh wound, Evacuation of hematoma. Suture ligation of multiple bleeding vessel and several points in gluteal area.      4/1: s/p Trach     4/4: return OR with ENT s/p laryngoscopy, esophagoscopy and bronchoscopy, trach revision.  Found extensive clot burden in esophagus and stomach as well as in the lungs. Oozing at thyroid bed cauterized. Trach exchanged to new 6.0 prox XLT elvie. OR findings:  blood up glottis and from thyroid bed to airway.     4/9: Patient is medically stable for OR on 4/11/24.  He is appropriately n.p.o. since midnight and has had more DVT prophylaxis held for OR today.    4/18: Patient bleeding into RLE given increased ANDERSON drain output and non-incrementing Hgb despite transfusion     Plan:  NEURO: History of myxoid chondrosarcoma s/p wide resection sarcoma, sciatic nerve neurolysis of right lower extremity with right gluteal reconstruction, pedicle ALT, vastus lateralus flap, pedicle gluteal and perisformis flap with Dr. Hawkins and Dr. Smith on 3/5/24 s/p cardiopulmonary arrest with ROSC 3/20. CT head 3/22 without acute process. Weaned off cisatracurium and propofol overnight 3/23-3/24. Ketamine weaned off 3/25 and Fentanyl weaned off 3/26 am. Repeat CT Head 3/26 without acute process. MRI Brain 3/30, negative for cerebral infarction or hemorrhage. Neuro exam - following commands except for RLE, tracking, nodding/shaking head to questions  - PRN Oxycodone and Tylenol continued  - PRN tylenol   -Dilaudid PRN   - ongoing neuro and pain assessments  - PT/OT -> AROM     CV: History of HTN, HLD. Acute cardiopulmonary arrest 2/2 to possible massive PE vs massive STEMI, received multiple rounds of CPR and one defibrillation.  Sustained ROSC achieved after TPA bolus. On high dose levophed, epinephrine, vaso, angioT2. Plan on 3/21 was for ECMO which was aborted secondary to large hemhorrge at right flap site. Had MTP and taken OR with 5L evacuated. ECHO 3/21: Normal LV, Mod enlarged RV, slight suggestion of a Townsend's sign / RV strain, low normal RV function. ECHO 3/22 with hyperdynamic LV. New onset Afib with RVR overnight 3/22-3/23, dosed with 150mg amio x2, started infusion at 1mg/min thereafter. AT2 weaned off. Amio  infusion discontinued 3/25. Lactate downtrending. Vaso and epi weaned off. Remains in NSR. iEpo weaned off 3/27. Repeat TTE 3/29 and 4/2 essentially unchanged. Levo resumed 4/2 evening to continue aggressive fluid removal. Repeat TTE on 4/10 with LVEF 65-70% and RV difficulty to assess. Patient HDS without pressors on 4/18. Blood transfusion ongoing for resuscitation.  - Ordered 25% Albumin 25 g  - continuous EKG/abp/cvp monitoring  - Goal map range 65-90  - Continue midodrine dose to 10 mg 3 times daily  - Continue florinef 0.1mg BID   -Continue stress dose steroids to 50 mg q 8h   - Off Levo since 4/13     PULM: Arrived to SICU intubated julio c-CPR. Concern for massive PE. Started on iEpo, currently on 0.05. Hypoxic respiratory failure. Severe ARDS. ETT exchanged 3/23. CT Chest 3/26 with interval JOHN consolidative/ground glass opacity. S/p Tracheostomy on 4/1. Bedside bronch -> some blood in trach, posterior wall tissue just distal to trach tip appears to be collapsing on cannula.   - Continue SIMV  - ABGs prn  - additional pulm toilet prn -will discuss regimen with RT  - daily CXR -chest x-ray today indicates interval increase in right-greater-than-left pulmonary  opacities. Noticeable R sided pleural effusion   - Post trialysis placement CXR - line in place - positioning confirmed with team. Pending final read     ENT: Had epistaxis 3/23, completed afrin bid x3 days. S/p trach on 4/1. Bleeding from trach site 4/2 am, packing placed by ENT. Repeat episode of bloody tracheal secretions 4/3 through this am. Taken back to OR with ENT 4/4 as per above.  -Had Slight bleeding from trach site noted per bedside nurse, however he was evaluated by ENT and they mentioned there was no evidence of bleeding in the patient's oropharynx and its most likely because of traumatic suctioning.   - Today: no bleeding noted around trach site        GI: NPO. Shock liver, pancreatitis. Elevated TG. Elevated lactate. Consulted ACS for  potential bowel ischemia as cause of persistent lactic acidosis. CT imaging 3/22 with evidence of pancreatitis. No acute surgical indication per ACS. RUQ US 3/22 with thickening of GB wall without cholelithiasis. CT A/P 3/26 negative for acute bleed. LFTs downtrending. Worsening hyperbilirubinemia. Amylase and lipase now WNL. Repeat RUQ US 4/1 with nonspecific gallbladder wall edema and thickening which may be 2/2 generalized fluid overload, mild hepatomegaly with slight nodular contour and c/f steatosis and fibrosis, irregular hypoechoic region adjacent to hepatic veins. US liver with doppler 4/2 revealed hepatomegaly with nodular contour, mildly elevated RI in main and left hepatic arteries. Hyperbilirubinemia  - Still has diarrheal episodes   - On Simethicone for gaseous distention on KUB  - Tube feeds held at approx 3am given potential OR today  - PPI BID given potential GI bleeding (elevated BUN)  - Trend LFTs daily -AST/ALT/total bilirubin is approximately similar to prior days.  Per discussion with hepatology, minor fluctuations are to be expected prior to eventual downtrend     : No history of renal disease. Baseline creatinine 0.6. Anuric RUTH. Elevated CK, downtrending. Metabolic acidosis, total body fluid overload, hyperkalemia. Started on CVVH 3/21, stopped bicarb infusion 3/22. Marino removed 3/24. Hyponatremia resolved. Stopped CVVH 4/2.  Has been tolerating iHD. Net +ve 1467.   - Trialysis line placed given potential need for CVVH for volume removal  - Nephrology following   - RFP BID and PRN  - Replete electrolytes judiciously     HEME: Patient was on ppx lovenox for DVT prophylaxis prior to cardiopulmonary arrest. Concern for massive PE patient received bolus of TPA during CPR. Started on heparin infusion overnight given concern for PE. Hemorrhagic shock 3/21, MTP and back to OR s/p Exploration of right thigh woundEvacuation of hematoma. Suture ligation of multiple bleeding vessel and several points  in gluteal area. Hematology consulted 3/22 to r/o HLH. Transfused 1 RBC overnight 3/22-3/23. Thrombocytopenia, coagulapathy, hypofibrinogenemia. LE Duplex 3/25 +acute occlusive R soleal DVT. Received 2u PRBC and 1u FFP on 3/26. Heparin infusion discontinued 3/26 to r/o HIT and transitioned to bival. PF4 negative, resumed heparin infuision 3/27. Elevated IL2R and decreased NK function. C/f HLH (low suspicion), empirically treated with decadron (3/28-3/31). Thrombocytopenia on 3/30 to 18k, received 5pk, did not increment. Heparin held. Thrombocytopenia likely 2/2 to consumptive process, hematology following. Received IVIG and 5pk plts 3/31. Pancytopenia persists, improving thrombocytopenia. On 4/18 increased bleeding noted into RLE. Blood transfusion ongoing and Hep gtt held  - cbc, coags bid and prn  - Heparin gtt held on 4/18 morning 3am  - Ongoing bleeding suspected given increased ANDERSON drain output and non-incrementing Hgb on post-transfusion labs. Drain output over past 24 hr is 1L  - Per plastics - planning for OR later today - add on case on board  - Blood products on hold for OR today  - close surveillance of RLE and drains  - maintain active T&S (4/18)     ENDO: History of DM2. A1c 7.5%. TSH WNL 1/2024. Hyperglycemia  - Decreased Lantus to 12u BID given NPO status   - q4h accuchecks and SSI #4     ID: Leukocytosis resolved, now with leukopenia. On broad antimicrobial therapy. MRSA screen + 2/28/24. Pan cultures sent 3/22. Sputum NTF, +MRSA screen (completed 5 day course mupirocin), UCx and BCx negative. Completed 7 day course vanc/zosyn 3/27. Febrile to 39.1 4/3, resent blood cultures and BAL and started vanco and zosyn. Switched zosyn to reynaldo, kept on vanco, added john. Blood cultures and sputum with Klebsiella. Repeat blood cultures sent 4/4.  - F/U cultures - Klebsiella grown in wound culture - susceptible to Meropenem per ID  - temp q4h, wbc daily  - Tissue cultures growing Klebsiella  - Per ID - continue  Meropenem 1g daily with end date 4/24  - If continues to be hypotensive consider broaden abx/ consider adding antifungal     Lines:  - L internal jugular trialysis (4/18)  - right radial a line (4/5)  - PIV x3  - Pending IR for tunneled line - will re-engage when patient clinically stable    Patient seen and discussed with Dr. Ness.    Dispo: Going to OR today per plastics team. Continued care with SICU.    Mariluz Dailey, PGY-1  SICU Team

## 2024-04-18 NOTE — PROGRESS NOTES
Occupational Therapy    Communication Note    Missed Visit: Yes  Missed Visit Reason:  (1019: spoke with RN. Concern for acute bleeding. Pt not appropriate for OT tx at this time.)      04/18/24 at 10:20 AM   Christelle Garces, OT   Rehab Office: [phone]

## 2024-04-18 NOTE — PROGRESS NOTES
Physical Therapy                 Therapy Communication Note    Patient Name: Jason Hollins  MRN: [id number]  Today's Date: 4/18/2024     Discipline: Physical Therapy    Missed Visit Reason:  (1026: Pt with c/f active bleeding per RN. Potential for RTOR. Will hold PT at this time.)    Missed Time: Attempt

## 2024-04-18 NOTE — PROGRESS NOTES
"  Department of Plastic and Reconstructive Surgery  Daily Progress Note    Patient Name: Jason Hollins  MRN: [id number]  Date:  04/18/24     Subjective   Concern for bleeding at R gluteal flap site overnight given interval development of tissue induration and high ANDERSON drain outputs (>600 ml collectively within span of a few hours). HGB 7.1, transfused 2u PRBCs and FFP overnight with minimal response in HGB, updated at 6.9 this AM. Pt BP stable and remains off pressor support. Discussed possible return to OR with plastics pending further clinical progression and improvement in coag status (Heparin held at 0230 and INR 1.5 this AM). Patient awake and alert, following commands and shaking head appropriately.     Objective   Vital Signs  /50   Pulse 93   Temp 36.5 °C (97.7 °F) (Temporal)   Resp 15   Ht 1.778 m (5' 10\")   Wt 119 kg (262 lb 12.6 oz)   SpO2 95%   BMI 37.71 kg/m²      Physical Exam   Constitutional: Alert, lying in pressure relieving bed in ICU, appears critically ill.   ENMT: MMM, dobhoff in R nare.  Cardiovascular: RRR on telemetry monitor.  Respiratory/Thorax: Trach to vent.  Gastrointestinal: Abdomen soft, protuberant.   Musculoskeletal: Able to squeeze bilateral hands, wiggles toes on the left, minimal movement on RLE, effort to move foot observed.   Extremities: Generalized pitting edema. Right thigh edematous, with interval development of tissue induration overlying flap site and throughout R thigh/gluteal region. Visible portion of flap recipient site and adjacent anterior R thigh incisions appear stable. Posterior flap incision line and posterior thigh/gluteal region incision dressed with incisional wound vac, maintaining low continuous suction at -75mmHg, no alarms for leak, obstruction or malfunction, expectantly no output in collecting canister. ANDERSON drain x 3 right upper leg with lighter red, bloody serous drainage.  Neurological: Off sedation, alert and able to respond to commands with " head nod.   Skin: Edematous, juandice, warm.     Diagnostics   Results for orders placed or performed during the hospital encounter of 03/05/24 (from the past 24 hour(s))   POCT GLUCOSE   Result Value Ref Range    POCT Glucose 200 (H) 74 - 99 mg/dL   POCT GLUCOSE   Result Value Ref Range    POCT Glucose 165 (H) 74 - 99 mg/dL   POCT GLUCOSE   Result Value Ref Range    POCT Glucose 218 (H) 74 - 99 mg/dL   CBC   Result Value Ref Range    WBC 7.2 4.4 - 11.3 x10*3/uL    nRBC 0.7 (H) 0.0 - 0.0 /100 WBCs    RBC 2.49 (L) 4.50 - 5.90 x10*6/uL    Hemoglobin 7.1 (L) 13.5 - 17.5 g/dL    Hematocrit 19.2 (L) 41.0 - 52.0 %    MCV 77 (L) 80 - 100 fL    MCH 28.5 26.0 - 34.0 pg    MCHC 37.0 (H) 32.0 - 36.0 g/dL    RDW 21.6 (H) 11.5 - 14.5 %    Platelets 171 150 - 450 x10*3/uL   CALCIUM, IONIZED   Result Value Ref Range    POCT Calcium, Ionized 1.12 1.1 - 1.33 mmol/L   Coagulation Screen   Result Value Ref Range    Protime 17.4 (H) 9.8 - 12.8 seconds    INR 1.5 (H) 0.9 - 1.1    aPTT 140 (HH) 27 - 38 seconds   Heparin Assay, UFH   Result Value Ref Range    Heparin Unfractionated 0.5 See Comment Below for Therapeutic Ranges IU/mL   Hepatic function panel   Result Value Ref Range    Albumin 3.1 (L) 3.4 - 5.0 g/dL    Bilirubin, Total 23.9 (H) 0.0 - 1.2 mg/dL    Bilirubin, Direct 16.5 (H) 0.0 - 0.3 mg/dL    Alkaline Phosphatase 185 (H) 33 - 136 U/L    ALT 5 (L) 10 - 52 U/L    AST 98 (H) 9 - 39 U/L    Total Protein 5.1 (L) 6.4 - 8.2 g/dL   Magnesium   Result Value Ref Range    Magnesium 2.40 1.60 - 2.40 mg/dL   Basic Metabolic Panel   Result Value Ref Range    Glucose 172 (H) 74 - 99 mg/dL    Sodium 136 136 - 145 mmol/L    Potassium 4.9 3.5 - 5.3 mmol/L    Chloride 97 (L) 98 - 107 mmol/L    Bicarbonate 23 21 - 32 mmol/L    Anion Gap 21 (H) 10 - 20 mmol/L    Urea Nitrogen 101 (HH) 6 - 23 mg/dL    Creatinine 2.95 (H) 0.50 - 1.30 mg/dL    eGFR 23 (L) >60 mL/min/1.73m*2    Calcium 8.5 (L) 8.6 - 10.6 mg/dL   Phosphorus   Result Value Ref Range     Phosphorus 4.7 2.5 - 4.9 mg/dL   Fibrinogen   Result Value Ref Range    Fibrinogen 171 (L) 200 - 400 mg/dL   POCT GLUCOSE   Result Value Ref Range    POCT Glucose 174 (H) 74 - 99 mg/dL   Blood Gas Arterial Full Panel   Result Value Ref Range    POCT pH, Arterial 7.44 (H) 7.38 - 7.42 pH    POCT pCO2, Arterial 32 (L) 38 - 42 mm Hg    POCT pO2, Arterial 76 (L) 85 - 95 mm Hg    POCT SO2, Arterial 99 94 - 100 %    POCT Oxy Hemoglobin, Arterial 95.1 94.0 - 98.0 %    POCT Hematocrit Calculated, Arterial 21.0 (L) 41.0 - 52.0 %    POCT Sodium, Arterial 133 (L) 136 - 145 mmol/L    POCT Potassium, Arterial 4.5 3.5 - 5.3 mmol/L    POCT Chloride, Arterial 100 98 - 107 mmol/L    POCT Ionized Calcium, Arterial 1.11 1.10 - 1.33 mmol/L    POCT Glucose, Arterial 157 (H) 74 - 99 mg/dL    POCT Lactate, Arterial 1.7 0.4 - 2.0 mmol/L    POCT Base Excess, Arterial -2.2 (L) -2.0 - 3.0 mmol/L    POCT HCO3 Calculated, Arterial 21.7 (L) 22.0 - 26.0 mmol/L    POCT Hemoglobin, Arterial 7.0 (L) 13.5 - 17.5 g/dL    POCT Anion Gap, Arterial 16 10 - 25 mmo/L    Patient Temperature 37.0 degrees Celsius    FiO2 40 %   Type and Screen   Result Value Ref Range    ABO TYPE O     Rh TYPE POS     ANTIBODY SCREEN NEG    Prepare RBC: 2 Units   Result Value Ref Range    PRODUCT CODE I7976B21     Unit Number [id number]     Unit ABO O     Unit RH POS     XM INTEP COMP     Dispense Status TR     Blood Expiration Date May 07, 2024 23:59 EDT     PRODUCT BLOOD TYPE 5100     UNIT VOLUME 350     PRODUCT CODE I0153P05     Unit Number [id number]     Unit ABO O     Unit RH POS     XM INTEP COMP     Dispense Status TR     Blood Expiration Date May 04, 2024 23:59 EDT     PRODUCT BLOOD TYPE 5100     UNIT VOLUME 350    Prepare Plasma: 2 Units   Result Value Ref Range    PRODUCT CODE K9334G73     Unit Number [id number]     Unit ABO O     Unit RH POS     Dispense Status IS     Blood Expiration Date April 22, 2024 09:10 EDT     PRODUCT BLOOD TYPE 5100      UNIT VOLUME 208     PRODUCT CODE T6475U56     Unit Number [id number]     Unit ABO O     Unit RH POS     Dispense Status TR     Blood Expiration Date April 22, 2024 09:10 EDT     PRODUCT BLOOD TYPE 5100     UNIT VOLUME 221    POCT GLUCOSE   Result Value Ref Range    POCT Glucose 154 (H) 74 - 99 mg/dL   CBC   Result Value Ref Range    WBC 6.9 4.4 - 11.3 x10*3/uL    nRBC 0.6 (H) 0.0 - 0.0 /100 WBCs    RBC 2.42 (L) 4.50 - 5.90 x10*6/uL    Hemoglobin 6.9 (L) 13.5 - 17.5 g/dL    Hematocrit 18.5 (L) 41.0 - 52.0 %    MCV 76 (L) 80 - 100 fL    MCH 28.5 26.0 - 34.0 pg    MCHC 37.3 (H) 32.0 - 36.0 g/dL    RDW 19.8 (H) 11.5 - 14.5 %    Platelets 131 (L) 150 - 450 x10*3/uL   Renal Function Panel   Result Value Ref Range    Glucose 151 (H) 74 - 99 mg/dL    Sodium 137 136 - 145 mmol/L    Potassium 5.2 3.5 - 5.3 mmol/L    Chloride 98 98 - 107 mmol/L    Bicarbonate 23 21 - 32 mmol/L    Anion Gap 21 (H) 10 - 20 mmol/L    Urea Nitrogen 103 (HH) 6 - 23 mg/dL    Creatinine 2.99 (H) 0.50 - 1.30 mg/dL    eGFR 23 (L) >60 mL/min/1.73m*2    Calcium 8.5 (L) 8.6 - 10.6 mg/dL    Phosphorus 5.4 (H) 2.5 - 4.9 mg/dL    Albumin 2.9 (L) 3.4 - 5.0 g/dL   Prepare RBC: 4 Units   Result Value Ref Range    PRODUCT CODE Y3869H60     Unit Number [id number]     Unit ABO O     Unit RH POS     XM INTEP COMP     Dispense Status IS     Blood Expiration Date May 08, 2024 23:59 EDT     PRODUCT BLOOD TYPE 5100     UNIT VOLUME 350     PRODUCT CODE R3752I38     Unit Number [id number]     Unit ABO O     Unit RH POS     XM INTEP COMP     Dispense Status IS     Blood Expiration Date May 08, 2024 23:59 EDT     PRODUCT BLOOD TYPE 5100     UNIT VOLUME 350     PRODUCT CODE G6231U97     Unit Number [id number]     Unit ABO O     Unit RH POS     XM INTEP COMP     Dispense Status IS     Blood Expiration Date May 09, 2024 23:59 EDT     PRODUCT BLOOD TYPE 5100     UNIT VOLUME 350     PRODUCT CODE N7292K50     Unit Number [id number]     Unit ABO O      Unit RH POS     XM INTEP COMP     Dispense Status IS     Blood Expiration Date May 12, 2024 23:59 EDT     PRODUCT BLOOD TYPE 5100     UNIT VOLUME 277    Prepare Plasma: 4 Units   Result Value Ref Range    PRODUCT CODE U8208M72     Unit Number [id number]     Unit ABO O     Unit RH POS     Dispense Status IS     Blood Expiration Date April 22, 2024 09:07 EDT     PRODUCT BLOOD TYPE 5100     UNIT VOLUME 306     PRODUCT CODE Q4586A51     Unit Number [id number]     Unit ABO O     Unit RH POS     Dispense Status IS     Blood Expiration Date April 22, 2024 09:07 EDT     PRODUCT BLOOD TYPE 5100     UNIT VOLUME 326     PRODUCT CODE C9460O70     Unit Number [id number]     Unit ABO O     Unit RH POS     Dispense Status IS     Blood Expiration Date April 22, 2024 09:07 EDT     PRODUCT BLOOD TYPE 5100     UNIT VOLUME 314    Prepare Cryoprecipitated AHF (Pooled Units): 1 Pools   Result Value Ref Range    PRODUCT CODE K5161W66     Unit Number [id number]     Unit ABO A     Unit RH POS     Dispense Status IS     Blood Expiration Date April 18, 2024 12:00 EDT     PRODUCT BLOOD TYPE 6200     UNIT VOLUME 69    Coagulation Screen   Result Value Ref Range    Protime 16.8 (H) 9.8 - 12.8 seconds    INR 1.5 (H) 0.9 - 1.1    aPTT 37 27 - 38 seconds   Blood Gas Arterial Full Panel   Result Value Ref Range    POCT pH, Arterial 7.47 (H) 7.38 - 7.42 pH    POCT pCO2, Arterial 31 (L) 38 - 42 mm Hg    POCT pO2, Arterial 84 (L) 85 - 95 mm Hg    POCT SO2, Arterial 99 94 - 100 %    POCT Oxy Hemoglobin, Arterial 95.2 94.0 - 98.0 %    POCT Hematocrit Calculated, Arterial 23.0 (L) 41.0 - 52.0 %    POCT Sodium, Arterial 132 (L) 136 - 145 mmol/L    POCT Potassium, Arterial 5.5 (H) 3.5 - 5.3 mmol/L    POCT Chloride, Arterial 101 98 - 107 mmol/L    POCT Ionized Calcium, Arterial 1.14 1.10 - 1.33 mmol/L    POCT Glucose, Arterial 172 (H) 74 - 99 mg/dL    POCT Lactate, Arterial 1.9 0.4 - 2.0 mmol/L    POCT Base Excess, Arterial -0.8 -2.0 -  3.0 mmol/L    POCT HCO3 Calculated, Arterial 22.6 22.0 - 26.0 mmol/L    POCT Hemoglobin, Arterial 7.6 (L) 13.5 - 17.5 g/dL    POCT Anion Gap, Arterial 14 10 - 25 mmo/L    Patient Temperature 37.0 degrees Celsius    FiO2 40 %   POCT GLUCOSE   Result Value Ref Range    POCT Glucose 165 (H) 74 - 99 mg/dL   TEG Clot Global Profile   Result Value Ref Range    R (Reaction Time) K 8.2 4.6 - 9.1 min    K (Clot Kinetics) 2.1 0.8 - 2.1 min    ANGLE 66.0 63.0 - 78.0 deg    MA (Max Amplitude) K 58.0 52.0 - 69.0 mm    R (Reaction Time) KH 9.0 (H) 4.3 - 8.3 min    MA (Max Amplitude) RT 57.0 52.0 - 70.0 mm    MA ( Ramiro Amplitude) FF 17.0 15.0 - 32.0 mm    FLEV 314 278 - 581 mg/dL   Prepare Platelets: 2 Units   Result Value Ref Range    PRODUCT CODE W9733Q49     Unit Number [id number]     Unit ABO A     Unit RH POS     Dispense Status IS     Blood Expiration Date April 19, 2024 23:59 EDT     PRODUCT BLOOD TYPE 6200     UNIT VOLUME 366     PRODUCT CODE S6443O24     Unit Number [id number]     Unit ABO A     Unit RH POS     Dispense Status IS     Blood Expiration Date April 19, 2024 23:59 EDT     PRODUCT BLOOD TYPE 6200     UNIT VOLUME 276      XR chest 1 view  Result Date: 4/14/2024  Interpreted By:  [facility]: XR CHEST 1 VIEW; 4/14/2024 10:08 am   INDICATION: Signs/Symptoms:Pleural effusion.   COMPARISON: 04/13/2024   ACCESSION NUMBER(S): [id number]   ORDERING CLINICIAN: LUIS WRIGHT   FINDINGS: Tracheostomy cannula is stable. Enteric tube is in place with the tip outside the field of view. Right IJ central venous catheter is projecting over upper CC.   Cardiac silhouette size is stable.   Bilateral mid and lower lung opacity, right more than left, unchanged compared to prior study. Increased interstitial markings. No sizable pneumothorax.   No acute osseous abnormality.     1. Bilateral mid and lower lung opacity with blunting of the costophrenic angles which are likely due to combination of  effusion, atelectasis and edema. Correlate with concern for superimposed infection. No significant interval change.       Signed by: Genaro Avila 4/14/2024 10:30 AM Dictation workstation:   [id number]    Current Medications  Scheduled medications  albumin human, 25 g, intravenous, q6h  albumin human, , ,   fludrocortisone, 0.1 mg, nasogastric tube, q12h  hydrocortisone sodium succinate, 50 mg, intravenous, q8h  insulin glargine, 12 Units, subcutaneous, q12h  insulin lispro, 0-20 Units, subcutaneous, q4h  meropenem, 1 g, intravenous, q24h  midodrine, 10 mg, orogastric tube, q8h  oxygen, , inhalation, Continuous - Inhalation  pancrelipase (Lip-Prot-Amyl) 2 tablet, sodium bicarbonate 650 mg, , nasogastric tube, Once  pantoprazole, 40 mg, intravenous, BID  phenylephrine, , ,       Continuous medications  [Held by provider] heparin, 0-4,500 Units/hr, Last Rate: Stopped (04/18/24 0229)  lactated Ringer's, 5 mL/hr, Last Rate: 5 mL/hr (04/16/24 1200)  phenylephrine, 0.1-1 mcg/kg/min (Dosing Weight), Last Rate: 0.5 mcg/kg/min (04/18/24 0200)      PRN medications  PRN medications: acetaminophen, albumin human, alteplase, calcium gluconate, calcium gluconate, dextrose, glucagon, labetaloL, magnesium sulfate, magnesium sulfate, midodrine, oxyCODONE, oxyCODONE, phenylephrine, sodium chloride     Assessment   Jason Hollins 63 y.o. 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 Hawkins and Dr. Smith on 3/5/24. Postoperative course c/b arrest requiring CPR with ROSC after TPA for assumed large PE on 3/20. Increased swelling at flap site appreciated overnight 3/20-3/21 and pt returned to OR for exploration of R flap site, evacuation of hematoma, suture ligation of multiple bleeding vessel sites in gluteal area and wound closure with  Dr. Smith on 3/21. Pt has remained in ICU for continued critical care evaluation and management postoperatively. Returned to OR 4/11 with plastic surgery for R posterior thigh/gluteal region I&D with tissue advancement/complex closure and application of incisional wound vac. Overnight on 4/18, pt with interval c/f bleeding recurrence at R thigh/gluteal region surgical site. Possible RTOR with plastics for hematoma evacuation and hemostasis pending clinical stability/improvement of coags.     Plan/Recommendations  - C/f bleeding at R gluteal flap site overnight given interval development of tissue induration and high ANDERSON drain outputs (>600 ml collectively within span of a few hours)       ·   Transfused 2u PRBCs and FFP overnight for HGB 7.1, minimal response, updated at 6.9 this AM       ·   Pt BP stable on AM assessment and remains off pressor support  - Discussed possible return to OR with plastics pending further clinical progression and improvement in coag status (Heparin held at 0230 and INR 1.5 this AM)  - Maintain incisional wound vac to R posterior thigh wound site per plastic surgery x14 (4/25) days post-op         ·  Settings: -75 mmHg low continuous suction        ·  Monitor/record vac canister output [id number]       ·  Notify plastic surgery with any concerns of leak or obstruction  - Continue ANDERSON drain care to x3 drains present at R thigh flap reconstruction site      ·  Strip drain tubing TID and PRN     ·  Monitor and record output q8h      ·  Keep drain sites C/D/I      ·  Notify plastics if ANDERSON drain output exceeds > 300 ml/hr (continuing to monitor closely)   - Avoid pressure application or positioning onto flap site or incisions at R upper leg   - Recommend patient be positioned off of R side as able to prevent flap compression/wound breakdown   - Continue clinitron fluidized air mattress  - Appreciate remaining supportive care per ICU   - Plastic Surgery will continue to follow     Patient seen and  plan discussed with Dr. Smith.    Gracie Smith PA-C  Plastic and Reconstructive Surgery   Pager #[phone]  Team phones: [phone]

## 2024-04-19 ENCOUNTER — APPOINTMENT (OUTPATIENT)
Dept: RADIOLOGY | Facility: HOSPITAL | Age: 64
DRG: 003 | End: 2024-04-19
Payer: COMMERCIAL

## 2024-04-19 LAB
ACANTHOCYTES BLD QL SMEAR: ABNORMAL
ALBUMIN SERPL BCP-MCNC: 3.2 G/DL (ref 3.4–5)
ALBUMIN SERPL BCP-MCNC: 3.2 G/DL (ref 3.4–5)
ALBUMIN SERPL BCP-MCNC: 3.3 G/DL (ref 3.4–5)
ALBUMIN SERPL BCP-MCNC: 3.3 G/DL (ref 3.4–5)
ALP SERPL-CCNC: 109 U/L (ref 33–136)
ALP SERPL-CCNC: 128 U/L (ref 33–136)
ALT SERPL W P-5'-P-CCNC: 5 U/L (ref 10–52)
ALT SERPL W P-5'-P-CCNC: 5 U/L (ref 10–52)
ANION GAP BLDA CALCULATED.4IONS-SCNC: 12 MMO/L (ref 10–25)
ANION GAP BLDA CALCULATED.4IONS-SCNC: 13 MMO/L (ref 10–25)
ANION GAP BLDA CALCULATED.4IONS-SCNC: 13 MMO/L (ref 10–25)
ANION GAP BLDA CALCULATED.4IONS-SCNC: 14 MMO/L (ref 10–25)
ANION GAP SERPL CALC-SCNC: 17 MMOL/L (ref 10–20)
ANION GAP SERPL CALC-SCNC: 18 MMOL/L (ref 10–20)
ANION GAP SERPL CALC-SCNC: 19 MMOL/L (ref 10–20)
ANION GAP SERPL CALC-SCNC: 20 MMOL/L (ref 10–20)
ANION GAP SERPL CALC-SCNC: 20 MMOL/L (ref 10–20)
APTT PPP: 33 SECONDS (ref 27–38)
APTT PPP: 38 SECONDS (ref 27–38)
APTT PPP: 40 SECONDS (ref 27–38)
AST SERPL W P-5'-P-CCNC: 87 U/L (ref 9–39)
AST SERPL W P-5'-P-CCNC: 91 U/L (ref 9–39)
BASE EXCESS BLDA CALC-SCNC: -2 MMOL/L (ref -2–3)
BASE EXCESS BLDA CALC-SCNC: -2.3 MMOL/L (ref -2–3)
BASE EXCESS BLDA CALC-SCNC: -2.9 MMOL/L (ref -2–3)
BASE EXCESS BLDA CALC-SCNC: -3.2 MMOL/L (ref -2–3)
BASE EXCESS BLDA CALC-SCNC: -3.5 MMOL/L (ref -2–3)
BASE EXCESS BLDA CALC-SCNC: -3.8 MMOL/L (ref -2–3)
BASOPHILS # BLD MANUAL: 0 X10*3/UL (ref 0–0.1)
BASOPHILS NFR BLD MANUAL: 0 %
BILIRUB DIRECT SERPL-MCNC: 15.3 MG/DL (ref 0–0.3)
BILIRUB DIRECT SERPL-MCNC: 21.3 MG/DL (ref 0–0.3)
BILIRUB SERPL-MCNC: 20.3 MG/DL (ref 0–1.2)
BILIRUB SERPL-MCNC: 20.9 MG/DL (ref 0–1.2)
BLOOD EXPIRATION DATE: NORMAL
BODY TEMPERATURE: 37 DEGREES CELSIUS
BUN SERPL-MCNC: 65 MG/DL (ref 6–23)
BUN SERPL-MCNC: 75 MG/DL (ref 6–23)
BUN SERPL-MCNC: 85 MG/DL (ref 6–23)
BUN SERPL-MCNC: 99 MG/DL (ref 6–23)
BUN SERPL-MCNC: 99 MG/DL (ref 6–23)
BURR CELLS BLD QL SMEAR: ABNORMAL
CA-I BLD-SCNC: 1.05 MMOL/L (ref 1.1–1.33)
CA-I BLD-SCNC: 1.1 MMOL/L (ref 1.1–1.33)
CA-I BLD-SCNC: 1.1 MMOL/L (ref 1.1–1.33)
CA-I BLDA-SCNC: 1.09 MMOL/L (ref 1.1–1.33)
CA-I BLDA-SCNC: 1.12 MMOL/L (ref 1.1–1.33)
CA-I BLDA-SCNC: 1.14 MMOL/L (ref 1.1–1.33)
CA-I BLDA-SCNC: 1.16 MMOL/L (ref 1.1–1.33)
CALCIUM SERPL-MCNC: 7.7 MG/DL (ref 8.6–10.6)
CALCIUM SERPL-MCNC: 8 MG/DL (ref 8.6–10.6)
CALCIUM SERPL-MCNC: 8.3 MG/DL (ref 8.6–10.6)
CALCIUM SERPL-MCNC: 8.5 MG/DL (ref 8.6–10.6)
CHLORIDE BLDA-SCNC: 102 MMOL/L (ref 98–107)
CHLORIDE BLDA-SCNC: 104 MMOL/L (ref 98–107)
CHLORIDE BLDA-SCNC: 106 MMOL/L (ref 98–107)
CHLORIDE SERPL-SCNC: 101 MMOL/L (ref 98–107)
CHLORIDE SERPL-SCNC: 102 MMOL/L (ref 98–107)
CHLORIDE SERPL-SCNC: 102 MMOL/L (ref 98–107)
CLOT INIT KAO IND P HEP NEUT BLD RES TEG: 7.1 MIN (ref 4.6–9.1)
CO2 SERPL-SCNC: 22 MMOL/L (ref 21–32)
CO2 SERPL-SCNC: 23 MMOL/L (ref 21–32)
CO2 SERPL-SCNC: 25 MMOL/L (ref 21–32)
CREAT SERPL-MCNC: 2.08 MG/DL (ref 0.5–1.3)
CREAT SERPL-MCNC: 2.33 MG/DL (ref 0.5–1.3)
CREAT SERPL-MCNC: 2.53 MG/DL (ref 0.5–1.3)
CREAT SERPL-MCNC: 2.88 MG/DL (ref 0.5–1.3)
CREAT SERPL-MCNC: 2.88 MG/DL (ref 0.5–1.3)
DISPENSE STATUS: NORMAL
EGFRCR SERPLBLD CKD-EPI 2021: 24 ML/MIN/1.73M*2
EGFRCR SERPLBLD CKD-EPI 2021: 24 ML/MIN/1.73M*2
EGFRCR SERPLBLD CKD-EPI 2021: 28 ML/MIN/1.73M*2
EGFRCR SERPLBLD CKD-EPI 2021: 31 ML/MIN/1.73M*2
EGFRCR SERPLBLD CKD-EPI 2021: 35 ML/MIN/1.73M*2
EOSINOPHIL # BLD MANUAL: 0 X10*3/UL (ref 0–0.7)
EOSINOPHIL NFR BLD MANUAL: 0 %
ERYTHROCYTE [DISTWIDTH] IN BLOOD BY AUTOMATED COUNT: 17.7 % (ref 11.5–14.5)
ERYTHROCYTE [DISTWIDTH] IN BLOOD BY AUTOMATED COUNT: 17.7 % (ref 11.5–14.5)
ERYTHROCYTE [DISTWIDTH] IN BLOOD BY AUTOMATED COUNT: 18 % (ref 11.5–14.5)
ERYTHROCYTE [DISTWIDTH] IN BLOOD BY AUTOMATED COUNT: 18.7 % (ref 11.5–14.5)
FIBRINOGEN PPP-MCNC: 174 MG/DL (ref 200–400)
GLUCOSE BLD MANUAL STRIP-MCNC: 100 MG/DL (ref 74–99)
GLUCOSE BLD MANUAL STRIP-MCNC: 102 MG/DL (ref 74–99)
GLUCOSE BLD MANUAL STRIP-MCNC: 107 MG/DL (ref 74–99)
GLUCOSE BLD MANUAL STRIP-MCNC: 108 MG/DL (ref 74–99)
GLUCOSE BLD MANUAL STRIP-MCNC: 112 MG/DL (ref 74–99)
GLUCOSE BLD MANUAL STRIP-MCNC: 114 MG/DL (ref 74–99)
GLUCOSE BLD MANUAL STRIP-MCNC: 120 MG/DL (ref 74–99)
GLUCOSE BLD MANUAL STRIP-MCNC: 121 MG/DL (ref 74–99)
GLUCOSE BLD MANUAL STRIP-MCNC: 53 MG/DL (ref 74–99)
GLUCOSE BLD MANUAL STRIP-MCNC: 64 MG/DL (ref 74–99)
GLUCOSE BLD MANUAL STRIP-MCNC: 77 MG/DL (ref 74–99)
GLUCOSE BLD MANUAL STRIP-MCNC: 77 MG/DL (ref 74–99)
GLUCOSE BLD MANUAL STRIP-MCNC: 79 MG/DL (ref 74–99)
GLUCOSE BLD MANUAL STRIP-MCNC: 81 MG/DL (ref 74–99)
GLUCOSE BLD MANUAL STRIP-MCNC: 85 MG/DL (ref 74–99)
GLUCOSE BLD MANUAL STRIP-MCNC: 85 MG/DL (ref 74–99)
GLUCOSE BLD MANUAL STRIP-MCNC: 89 MG/DL (ref 74–99)
GLUCOSE BLD MANUAL STRIP-MCNC: 91 MG/DL (ref 74–99)
GLUCOSE BLDA-MCNC: 109 MG/DL (ref 74–99)
GLUCOSE BLDA-MCNC: 110 MG/DL (ref 74–99)
GLUCOSE BLDA-MCNC: 65 MG/DL (ref 74–99)
GLUCOSE BLDA-MCNC: 81 MG/DL (ref 74–99)
GLUCOSE BLDA-MCNC: 86 MG/DL (ref 74–99)
GLUCOSE BLDA-MCNC: 90 MG/DL (ref 74–99)
GLUCOSE SERPL-MCNC: 108 MG/DL (ref 74–99)
GLUCOSE SERPL-MCNC: 65 MG/DL (ref 74–99)
GLUCOSE SERPL-MCNC: 68 MG/DL (ref 74–99)
GLUCOSE SERPL-MCNC: 84 MG/DL (ref 74–99)
HCO3 BLDA-SCNC: 20.4 MMOL/L (ref 22–26)
HCO3 BLDA-SCNC: 21.4 MMOL/L (ref 22–26)
HCO3 BLDA-SCNC: 21.5 MMOL/L (ref 22–26)
HCO3 BLDA-SCNC: 21.9 MMOL/L (ref 22–26)
HCO3 BLDA-SCNC: 21.9 MMOL/L (ref 22–26)
HCO3 BLDA-SCNC: 22.4 MMOL/L (ref 22–26)
HCT VFR BLD AUTO: 22.4 % (ref 41–52)
HCT VFR BLD AUTO: 24.8 % (ref 41–52)
HCT VFR BLD AUTO: 24.9 % (ref 41–52)
HCT VFR BLD AUTO: 26.9 % (ref 41–52)
HCT VFR BLD EST: 25 % (ref 41–52)
HCT VFR BLD EST: 25 % (ref 41–52)
HCT VFR BLD EST: 26 % (ref 41–52)
HCT VFR BLD EST: 26 % (ref 41–52)
HCT VFR BLD EST: 27 % (ref 41–52)
HCT VFR BLD EST: 28 % (ref 41–52)
HGB BLD-MCNC: 10 G/DL (ref 13.5–17.5)
HGB BLD-MCNC: 8.3 G/DL (ref 13.5–17.5)
HGB BLD-MCNC: 8.6 G/DL (ref 13.5–17.5)
HGB BLD-MCNC: 9 G/DL (ref 13.5–17.5)
HGB BLDA-MCNC: 8.2 G/DL (ref 13.5–17.5)
HGB BLDA-MCNC: 8.4 G/DL (ref 13.5–17.5)
HGB BLDA-MCNC: 8.6 G/DL (ref 13.5–17.5)
HGB BLDA-MCNC: 8.8 G/DL (ref 13.5–17.5)
HGB BLDA-MCNC: 9 G/DL (ref 13.5–17.5)
HGB BLDA-MCNC: 9.3 G/DL (ref 13.5–17.5)
HYPOCHROMIA BLD QL SMEAR: ABNORMAL
IMM GRANULOCYTES # BLD AUTO: 0.58 X10*3/UL (ref 0–0.7)
IMM GRANULOCYTES NFR BLD AUTO: 5.5 % (ref 0–0.9)
INHALED O2 CONCENTRATION: 50 %
INHALED O2 CONCENTRATION: 70 %
INR PPP: 1.3 (ref 0.9–1.1)
INR PPP: 1.4 (ref 0.9–1.1)
INR PPP: 1.5 (ref 0.9–1.1)
LACTATE BLDA-SCNC: 1.4 MMOL/L (ref 0.4–2)
LACTATE BLDA-SCNC: 1.5 MMOL/L (ref 0.4–2)
LACTATE BLDA-SCNC: 1.5 MMOL/L (ref 0.4–2)
LACTATE BLDA-SCNC: 1.6 MMOL/L (ref 0.4–2)
LACTATE BLDA-SCNC: 1.8 MMOL/L (ref 0.4–2)
LACTATE BLDA-SCNC: 2 MMOL/L (ref 0.4–2)
LACTATE BLDV-SCNC: 2 MMOL/L (ref 0.4–2)
LYMPHOCYTES # BLD MANUAL: 0.08 X10*3/UL (ref 1.2–4.8)
LYMPHOCYTES NFR BLD MANUAL: 0.8 %
LYS KAO IND CLT 30M P MA LENFR BL RESTEG: 0 % (ref 0–2.6)
MA KAOLIN+TF BLD RES TEG: 56 MM (ref 52–70)
MA TF IND+IIB-IIIA INH BLD RES TEG: 17 MM (ref 15–32)
MAGNESIUM SERPL-MCNC: 2.24 MG/DL (ref 1.6–2.4)
MAGNESIUM SERPL-MCNC: 2.25 MG/DL (ref 1.6–2.4)
MAGNESIUM SERPL-MCNC: 2.29 MG/DL (ref 1.6–2.4)
MAGNESIUM SERPL-MCNC: 2.29 MG/DL (ref 1.6–2.4)
MCH RBC QN AUTO: 28 PG (ref 26–34)
MCH RBC QN AUTO: 28.5 PG (ref 26–34)
MCH RBC QN AUTO: 28.8 PG (ref 26–34)
MCH RBC QN AUTO: 29 PG (ref 26–34)
MCHC RBC AUTO-ENTMCNC: 34.5 G/DL (ref 32–36)
MCHC RBC AUTO-ENTMCNC: 36.3 G/DL (ref 32–36)
MCHC RBC AUTO-ENTMCNC: 37.1 G/DL (ref 32–36)
MCHC RBC AUTO-ENTMCNC: 37.2 G/DL (ref 32–36)
MCV RBC AUTO: 78 FL (ref 80–100)
MCV RBC AUTO: 78 FL (ref 80–100)
MCV RBC AUTO: 79 FL (ref 80–100)
MCV RBC AUTO: 81 FL (ref 80–100)
MONOCYTES # BLD MANUAL: 0.08 X10*3/UL (ref 0.1–1)
MONOCYTES NFR BLD MANUAL: 0.8 %
MYELOCYTES # BLD MANUAL: 0.08 X10*3/UL
MYELOCYTES NFR BLD MANUAL: 0.8 %
NEUTROPHILS # BLD MANUAL: 10.34 X10*3/UL (ref 1.2–7.7)
NEUTS BAND # BLD MANUAL: 1.13 X10*3/UL (ref 0–0.7)
NEUTS BAND NFR BLD MANUAL: 10.7 %
NEUTS SEG # BLD MANUAL: 9.21 X10*3/UL (ref 1.2–7)
NEUTS SEG NFR BLD MANUAL: 86.9 %
NRBC BLD-RTO: 0.5 /100 WBCS (ref 0–0)
NRBC BLD-RTO: 0.6 /100 WBCS (ref 0–0)
NRBC BLD-RTO: 0.7 /100 WBCS (ref 0–0)
NRBC BLD-RTO: 0.8 /100 WBCS (ref 0–0)
OVALOCYTES BLD QL SMEAR: ABNORMAL
OXYHGB MFR BLDA: 94.2 % (ref 94–98)
OXYHGB MFR BLDA: 95.6 % (ref 94–98)
OXYHGB MFR BLDA: 96.2 % (ref 94–98)
OXYHGB MFR BLDA: 96.3 % (ref 94–98)
OXYHGB MFR BLDA: 96.5 % (ref 94–98)
OXYHGB MFR BLDA: 96.6 % (ref 94–98)
PCO2 BLDA: 30 MM HG (ref 38–42)
PCO2 BLDA: 33 MM HG (ref 38–42)
PCO2 BLDA: 37 MM HG (ref 38–42)
PCO2 BLDA: 39 MM HG (ref 38–42)
PH BLDA: 7.35 PH (ref 7.38–7.42)
PH BLDA: 7.37 PH (ref 7.38–7.42)
PH BLDA: 7.38 PH (ref 7.38–7.42)
PH BLDA: 7.39 PH (ref 7.38–7.42)
PH BLDA: 7.43 PH (ref 7.38–7.42)
PH BLDA: 7.44 PH (ref 7.38–7.42)
PHOSPHATE SERPL-MCNC: 4.1 MG/DL (ref 2.5–4.9)
PHOSPHATE SERPL-MCNC: 4.8 MG/DL (ref 2.5–4.9)
PHOSPHATE SERPL-MCNC: 5.7 MG/DL (ref 2.5–4.9)
PHOSPHATE SERPL-MCNC: 7.2 MG/DL (ref 2.5–4.9)
PLATELET # BLD AUTO: 105 X10*3/UL (ref 150–450)
PLATELET # BLD AUTO: 116 X10*3/UL (ref 150–450)
PLATELET # BLD AUTO: 141 X10*3/UL (ref 150–450)
PLATELET # BLD AUTO: 97 X10*3/UL (ref 150–450)
PO2 BLDA: 107 MM HG (ref 85–95)
PO2 BLDA: 111 MM HG (ref 85–95)
PO2 BLDA: 130 MM HG (ref 85–95)
PO2 BLDA: 155 MM HG (ref 85–95)
PO2 BLDA: 73 MM HG (ref 85–95)
PO2 BLDA: 96 MM HG (ref 85–95)
POLYCHROMASIA BLD QL SMEAR: ABNORMAL
POTASSIUM BLDA-SCNC: 4.3 MMOL/L (ref 3.5–5.3)
POTASSIUM BLDA-SCNC: 4.5 MMOL/L (ref 3.5–5.3)
POTASSIUM BLDA-SCNC: 4.7 MMOL/L (ref 3.5–5.3)
POTASSIUM BLDA-SCNC: 4.9 MMOL/L (ref 3.5–5.3)
POTASSIUM SERPL-SCNC: 4.7 MMOL/L (ref 3.5–5.3)
POTASSIUM SERPL-SCNC: 4.8 MMOL/L (ref 3.5–5.3)
POTASSIUM SERPL-SCNC: 4.8 MMOL/L (ref 3.5–5.3)
POTASSIUM SERPL-SCNC: 5.1 MMOL/L (ref 3.5–5.3)
POTASSIUM SERPL-SCNC: 5.1 MMOL/L (ref 3.5–5.3)
PRODUCT BLOOD TYPE: 5100
PRODUCT BLOOD TYPE: 5100
PRODUCT BLOOD TYPE: 7300
PRODUCT BLOOD TYPE: 7300
PRODUCT CODE: NORMAL
PROT SERPL-MCNC: 4.6 G/DL (ref 6.4–8.2)
PROT SERPL-MCNC: 4.7 G/DL (ref 6.4–8.2)
PROTHROMBIN TIME: 15.2 SECONDS (ref 9.8–12.8)
PROTHROMBIN TIME: 15.7 SECONDS (ref 9.8–12.8)
PROTHROMBIN TIME: 16.6 SECONDS (ref 9.8–12.8)
RBC # BLD AUTO: 2.88 X10*6/UL (ref 4.5–5.9)
RBC # BLD AUTO: 3.07 X10*6/UL (ref 4.5–5.9)
RBC # BLD AUTO: 3.16 X10*6/UL (ref 4.5–5.9)
RBC # BLD AUTO: 3.45 X10*6/UL (ref 4.5–5.9)
RBC MORPH BLD: ABNORMAL
SAO2 % BLDA: 100 % (ref 94–100)
SAO2 % BLDA: 100 % (ref 94–100)
SAO2 % BLDA: 97 % (ref 94–100)
SAO2 % BLDA: 99 % (ref 94–100)
SODIUM BLDA-SCNC: 133 MMOL/L (ref 136–145)
SODIUM BLDA-SCNC: 133 MMOL/L (ref 136–145)
SODIUM BLDA-SCNC: 134 MMOL/L (ref 136–145)
SODIUM SERPL-SCNC: 137 MMOL/L (ref 136–145)
SODIUM SERPL-SCNC: 138 MMOL/L (ref 136–145)
SODIUM SERPL-SCNC: 139 MMOL/L (ref 136–145)
TARGETS BLD QL SMEAR: ABNORMAL
TOTAL CELLS COUNTED BLD: 122
UNIT ABO: NORMAL
UNIT NUMBER: NORMAL
UNIT RH: NORMAL
UNIT VOLUME: 208
UNIT VOLUME: 221
UNIT VOLUME: 290
UNIT VOLUME: 291
WBC # BLD AUTO: 10.6 X10*3/UL (ref 4.4–11.3)
WBC # BLD AUTO: 10.9 X10*3/UL (ref 4.4–11.3)
WBC # BLD AUTO: 5.4 X10*3/UL (ref 4.4–11.3)
WBC # BLD AUTO: 7.8 X10*3/UL (ref 4.4–11.3)

## 2024-04-19 PROCEDURE — 2500000004 HC RX 250 GENERAL PHARMACY W/ HCPCS (ALT 636 FOR OP/ED): Performed by: STUDENT IN AN ORGANIZED HEALTH CARE EDUCATION/TRAINING PROGRAM

## 2024-04-19 PROCEDURE — C9113 INJ PANTOPRAZOLE SODIUM, VIA: HCPCS | Performed by: STUDENT IN AN ORGANIZED HEALTH CARE EDUCATION/TRAINING PROGRAM

## 2024-04-19 PROCEDURE — 82947 ASSAY GLUCOSE BLOOD QUANT: CPT

## 2024-04-19 PROCEDURE — 2500000005 HC RX 250 GENERAL PHARMACY W/O HCPCS: Performed by: STUDENT IN AN ORGANIZED HEALTH CARE EDUCATION/TRAINING PROGRAM

## 2024-04-19 PROCEDURE — 99024 POSTOP FOLLOW-UP VISIT: CPT

## 2024-04-19 PROCEDURE — P9047 ALBUMIN (HUMAN), 25%, 50ML: HCPCS | Mod: JZ | Performed by: STUDENT IN AN ORGANIZED HEALTH CARE EDUCATION/TRAINING PROGRAM

## 2024-04-19 PROCEDURE — 2500000004 HC RX 250 GENERAL PHARMACY W/ HCPCS (ALT 636 FOR OP/ED): Mod: JZ | Performed by: STUDENT IN AN ORGANIZED HEALTH CARE EDUCATION/TRAINING PROGRAM

## 2024-04-19 PROCEDURE — 71045 X-RAY EXAM CHEST 1 VIEW: CPT

## 2024-04-19 PROCEDURE — 2500000001 HC RX 250 WO HCPCS SELF ADMINISTERED DRUGS (ALT 637 FOR MEDICARE OP): Performed by: STUDENT IN AN ORGANIZED HEALTH CARE EDUCATION/TRAINING PROGRAM

## 2024-04-19 PROCEDURE — 94003 VENT MGMT INPAT SUBQ DAY: CPT

## 2024-04-19 PROCEDURE — 83605 ASSAY OF LACTIC ACID: CPT | Performed by: STUDENT IN AN ORGANIZED HEALTH CARE EDUCATION/TRAINING PROGRAM

## 2024-04-19 PROCEDURE — 85610 PROTHROMBIN TIME: CPT | Performed by: STUDENT IN AN ORGANIZED HEALTH CARE EDUCATION/TRAINING PROGRAM

## 2024-04-19 PROCEDURE — 99291 CRITICAL CARE FIRST HOUR: CPT | Performed by: STUDENT IN AN ORGANIZED HEALTH CARE EDUCATION/TRAINING PROGRAM

## 2024-04-19 PROCEDURE — 84132 ASSAY OF SERUM POTASSIUM: CPT | Performed by: STUDENT IN AN ORGANIZED HEALTH CARE EDUCATION/TRAINING PROGRAM

## 2024-04-19 PROCEDURE — 82330 ASSAY OF CALCIUM: CPT | Performed by: STUDENT IN AN ORGANIZED HEALTH CARE EDUCATION/TRAINING PROGRAM

## 2024-04-19 PROCEDURE — 83735 ASSAY OF MAGNESIUM: CPT | Performed by: STUDENT IN AN ORGANIZED HEALTH CARE EDUCATION/TRAINING PROGRAM

## 2024-04-19 PROCEDURE — 84075 ASSAY ALKALINE PHOSPHATASE: CPT | Performed by: STUDENT IN AN ORGANIZED HEALTH CARE EDUCATION/TRAINING PROGRAM

## 2024-04-19 PROCEDURE — 99231 SBSQ HOSP IP/OBS SF/LOW 25: CPT | Performed by: STUDENT IN AN ORGANIZED HEALTH CARE EDUCATION/TRAINING PROGRAM

## 2024-04-19 PROCEDURE — 84100 ASSAY OF PHOSPHORUS: CPT | Performed by: STUDENT IN AN ORGANIZED HEALTH CARE EDUCATION/TRAINING PROGRAM

## 2024-04-19 PROCEDURE — 90937 HEMODIALYSIS REPEATED EVAL: CPT

## 2024-04-19 PROCEDURE — 85007 BL SMEAR W/DIFF WBC COUNT: CPT | Performed by: STUDENT IN AN ORGANIZED HEALTH CARE EDUCATION/TRAINING PROGRAM

## 2024-04-19 PROCEDURE — 85027 COMPLETE CBC AUTOMATED: CPT | Performed by: STUDENT IN AN ORGANIZED HEALTH CARE EDUCATION/TRAINING PROGRAM

## 2024-04-19 PROCEDURE — 37799 UNLISTED PX VASCULAR SURGERY: CPT | Performed by: STUDENT IN AN ORGANIZED HEALTH CARE EDUCATION/TRAINING PROGRAM

## 2024-04-19 PROCEDURE — 71045 X-RAY EXAM CHEST 1 VIEW: CPT | Performed by: STUDENT IN AN ORGANIZED HEALTH CARE EDUCATION/TRAINING PROGRAM

## 2024-04-19 PROCEDURE — 2020000001 HC ICU ROOM DAILY

## 2024-04-19 PROCEDURE — 2500000001 HC RX 250 WO HCPCS SELF ADMINISTERED DRUGS (ALT 637 FOR MEDICARE OP): Performed by: PHYSICIAN ASSISTANT

## 2024-04-19 RX ORDER — MEROPENEM 1 G/1
1 INJECTION, POWDER, FOR SOLUTION INTRAVENOUS EVERY 12 HOURS
Status: DISCONTINUED | OUTPATIENT
Start: 2024-04-19 | End: 2024-04-26

## 2024-04-19 RX ORDER — ALBUMIN HUMAN 250 G/1000ML
25 SOLUTION INTRAVENOUS EVERY 8 HOURS
Status: DISCONTINUED | OUTPATIENT
Start: 2024-04-19 | End: 2024-04-21

## 2024-04-19 RX ORDER — HYDROMORPHONE HYDROCHLORIDE 1 MG/ML
0.2 INJECTION, SOLUTION INTRAMUSCULAR; INTRAVENOUS; SUBCUTANEOUS
Status: DISCONTINUED | OUTPATIENT
Start: 2024-04-19 | End: 2024-04-20

## 2024-04-19 RX ORDER — HYDROMORPHONE HYDROCHLORIDE 1 MG/ML
0.4 INJECTION, SOLUTION INTRAMUSCULAR; INTRAVENOUS; SUBCUTANEOUS EVERY 4 HOURS PRN
Status: DISCONTINUED | OUTPATIENT
Start: 2024-04-19 | End: 2024-04-20

## 2024-04-19 RX ADMIN — SODIUM CHLORIDE, POTASSIUM CHLORIDE, SODIUM LACTATE AND CALCIUM CHLORIDE 5 ML/HR: 600; 310; 30; 20 INJECTION, SOLUTION INTRAVENOUS at 04:00

## 2024-04-19 RX ADMIN — HYDROCORTISONE SODIUM SUCCINATE 50 MG: 100 INJECTION, POWDER, FOR SOLUTION INTRAMUSCULAR; INTRAVENOUS at 03:10

## 2024-04-19 RX ADMIN — PANTOPRAZOLE SODIUM 40 MG: 40 INJECTION, POWDER, FOR SOLUTION INTRAVENOUS at 20:12

## 2024-04-19 RX ADMIN — DEXTROSE MONOHYDRATE 12.5 G: 25 INJECTION, SOLUTION INTRAVENOUS at 02:59

## 2024-04-19 RX ADMIN — CALCIUM CHLORIDE, MAGNESIUM CHLORIDE, DEXTROSE MONOHYDRATE, LACTIC ACID, SODIUM CHLORIDE, SODIUM BICARBONATE AND POTASSIUM CHLORIDE 3000 ML/HR: 3.68; 3.05; 22; 5.4; 6.46; 3.09; .314 INJECTION INTRAVENOUS at 21:58

## 2024-04-19 RX ADMIN — PANTOPRAZOLE SODIUM 40 MG: 40 INJECTION, POWDER, FOR SOLUTION INTRAVENOUS at 00:36

## 2024-04-19 RX ADMIN — MIDODRINE HYDROCHLORIDE 10 MG: 10 TABLET ORAL at 04:53

## 2024-04-19 RX ADMIN — ALBUMIN HUMAN 25 G: 0.25 SOLUTION INTRAVENOUS at 21:19

## 2024-04-19 RX ADMIN — PANTOPRAZOLE SODIUM 40 MG: 40 INJECTION, POWDER, FOR SOLUTION INTRAVENOUS at 08:03

## 2024-04-19 RX ADMIN — MEROPENEM 1 G: 1 INJECTION, POWDER, FOR SOLUTION INTRAVENOUS at 20:12

## 2024-04-19 RX ADMIN — CALCIUM CHLORIDE, MAGNESIUM CHLORIDE, DEXTROSE MONOHYDRATE, LACTIC ACID, SODIUM CHLORIDE, SODIUM BICARBONATE AND POTASSIUM CHLORIDE 3000 ML/HR: 3.68; 3.05; 22; 5.4; 6.46; 3.09; .314 INJECTION INTRAVENOUS at 22:00

## 2024-04-19 RX ADMIN — CALCIUM GLUCONATE 1 G: 20 INJECTION, SOLUTION INTRAVENOUS at 02:07

## 2024-04-19 RX ADMIN — CALCIUM GLUCONATE 1 G: 20 INJECTION, SOLUTION INTRAVENOUS at 08:31

## 2024-04-19 RX ADMIN — ALBUMIN HUMAN 25 G: 0.25 SOLUTION INTRAVENOUS at 13:22

## 2024-04-19 RX ADMIN — INSULIN GLARGINE 12 UNITS: 100 INJECTION, SOLUTION SUBCUTANEOUS at 00:37

## 2024-04-19 RX ADMIN — Medication 50 PERCENT: at 20:00

## 2024-04-19 RX ADMIN — CALCIUM CHLORIDE, MAGNESIUM CHLORIDE, DEXTROSE MONOHYDRATE, LACTIC ACID, SODIUM CHLORIDE, SODIUM BICARBONATE AND POTASSIUM CHLORIDE 3000 ML/HR: 3.68; 3.05; 22; 5.4; 6.46; 3.09; .314 INJECTION INTRAVENOUS at 21:59

## 2024-04-19 RX ADMIN — Medication: at 08:00

## 2024-04-19 RX ADMIN — MIDODRINE HYDROCHLORIDE 10 MG: 10 TABLET ORAL at 17:55

## 2024-04-19 RX ADMIN — MIDODRINE HYDROCHLORIDE 10 MG: 10 TABLET ORAL at 10:21

## 2024-04-19 RX ADMIN — DEXTROSE MONOHYDRATE 12.5 G: 25 INJECTION, SOLUTION INTRAVENOUS at 08:02

## 2024-04-19 RX ADMIN — HYDROCORTISONE SODIUM SUCCINATE 50 MG: 100 INJECTION, POWDER, FOR SOLUTION INTRAMUSCULAR; INTRAVENOUS at 17:55

## 2024-04-19 RX ADMIN — FLUDROCORTISONE ACETATE 0.1 MG: 0.1 TABLET ORAL at 15:32

## 2024-04-19 RX ADMIN — ALBUMIN HUMAN 25 G: 0.25 SOLUTION INTRAVENOUS at 07:20

## 2024-04-19 RX ADMIN — HYDROCORTISONE SODIUM SUCCINATE 50 MG: 100 INJECTION, POWDER, FOR SOLUTION INTRAMUSCULAR; INTRAVENOUS at 10:18

## 2024-04-19 ASSESSMENT — PAIN SCALES - GENERAL
PAINLEVEL_OUTOF10: 0 - NO PAIN

## 2024-04-19 ASSESSMENT — PAIN - FUNCTIONAL ASSESSMENT
PAIN_FUNCTIONAL_ASSESSMENT: 0-10

## 2024-04-19 NOTE — ANESTHESIA POSTPROCEDURE EVALUATION
Patient: Jason Hollins    Procedure Summary       Date: 04/18/24 Room / Location: [facility] OR 17 / [facility] OR    Anesthesia Start: 1930 Anesthesia Stop: 2319    Procedures:       Incision and Drainage Hip, Closure, Incisional Wound Vac Placement (Right: Buttocks)      Evacuation Hematoma Hip (Right: Buttocks) Diagnosis:       Extraskeletal myxoid chondrosarcoma (Multi)      Postoperative wound breakdown, sequela      Hematoma      (Extraskeletal myxoid chondrosarcoma (Multi) [C49.9])      (Postoperative wound breakdown, sequela [T81.31XS])      (Hematoma [T14.8XXA])    Surgeons: Angy Smith MD Responsible Provider: Palmira Chris MD    Anesthesia Type: general ASA Status: 4 - Emergent            Anesthesia Type: general    Vitals Value Taken Time   /86 04/18/24 2316   Temp  04/18/24 2319   Pulse 93 04/18/24 2318   Resp 0 04/18/24 2318   SpO2 94 % 04/18/24 2318   Vitals shown include unfiled device data.    Anesthesia Post Evaluation    Patient location during evaluation: ICU  Patient participation: complete - patient cannot participate  Level of consciousness: sleepy but conscious  Pain score: 0  Pain management: adequate  Airway patency: patent  Cardiovascular status: acceptable  Respiratory status: acceptable  Hydration status: acceptable  Postoperative Nausea and Vomiting: none        There were no known notable events for this encounter.

## 2024-04-19 NOTE — SIGNIFICANT EVENT
Department of Plastic and Reconstructive Surgery   Post op Check    Subjective:  Found resting in bed comfortably in [facility] postoperatively with family at bedside. Patient slightly drowsy, emerging from anesthesia. Remains critically ill in intensive care, trach to vent.     Objective:  Constitutional: Alert, lying in pressure relieving bed in ICU, appears critically ill.   ENMT: MMM, dobhoff in R nare.  Cardiovascular: RRR on telemetry monitor.  Respiratory/Thorax: Trach to vent.  Gastrointestinal: Abdomen soft, protuberant.   Musculoskeletal: Able to squeeze bilateral hands, wiggles toes on the left, minimal movement on RLE, effort to move foot observed.   Extremities: Generalized pitting edema. Right thigh edematous, edema improved S/P hematoma evac. Visible portion of flap recipient site and adjacent anterior R thigh incisions appear stable. Posterior flap incision line and posterior thigh/gluteal region incision dressed with incisional wound vac, maintaining low continuous suction at -50mmHg, no alarms for leak, obstruction or malfunction, expectantly no output in collecting canister. ANDERSON drain x 3 right upper leg with minimal since OR dark red, bloody drainage.  Neurological: Off sedation, alert and able to respond to commands with head nod.   Skin: Edematous, juandice, warm.     Vitals:    04/19/24 0100   BP: 106/62   Pulse: 73   Resp: 22   Temp:    SpO2: 100%       Results for orders placed or performed during the hospital encounter of 03/05/24 (from the past 24 hour(s))   POCT GLUCOSE   Result Value Ref Range    POCT Glucose 154 (H) 74 - 99 mg/dL   CBC   Result Value Ref Range    WBC 6.9 4.4 - 11.3 x10*3/uL    nRBC 0.6 (H) 0.0 - 0.0 /100 WBCs    RBC 2.42 (L) 4.50 - 5.90 x10*6/uL    Hemoglobin 6.9 (L) 13.5 - 17.5 g/dL    Hematocrit 18.5 (L) 41.0 - 52.0 %    MCV 76 (L) 80 - 100 fL    MCH 28.5 26.0 - 34.0 pg    MCHC 37.3 (H) 32.0 - 36.0 g/dL    RDW 19.8 (H) 11.5 - 14.5 %    Platelets 131 (L) 150 - 450 x10*3/uL    Renal Function Panel   Result Value Ref Range    Glucose 151 (H) 74 - 99 mg/dL    Sodium 137 136 - 145 mmol/L    Potassium 5.2 3.5 - 5.3 mmol/L    Chloride 98 98 - 107 mmol/L    Bicarbonate 23 21 - 32 mmol/L    Anion Gap 21 (H) 10 - 20 mmol/L    Urea Nitrogen 103 (HH) 6 - 23 mg/dL    Creatinine 2.99 (H) 0.50 - 1.30 mg/dL    eGFR 23 (L) >60 mL/min/1.73m*2    Calcium 8.5 (L) 8.6 - 10.6 mg/dL    Phosphorus 5.4 (H) 2.5 - 4.9 mg/dL    Albumin 2.9 (L) 3.4 - 5.0 g/dL   Prepare RBC: 4 Units   Result Value Ref Range    PRODUCT CODE K2553C99     Unit Number [id number]     Unit ABO O     Unit RH POS     XM INTEP COMP     Dispense Status TR     Blood Expiration Date May 08, 2024 23:59 EDT     PRODUCT BLOOD TYPE 5100     UNIT VOLUME 350     PRODUCT CODE C0349J94     Unit Number [id number]     Unit ABO O     Unit RH POS     XM INTEP COMP     Dispense Status TR     Blood Expiration Date May 08, 2024 23:59 EDT     PRODUCT BLOOD TYPE 5100     UNIT VOLUME 350     PRODUCT CODE S2570L85     Unit Number [id number]     Unit ABO O     Unit RH POS     XM INTEP COMP     Dispense Status TR     Blood Expiration Date May 09, 2024 23:59 EDT     PRODUCT BLOOD TYPE 5100     UNIT VOLUME 350     PRODUCT CODE R9287D85     Unit Number [id number]     Unit ABO O     Unit RH POS     XM INTEP COMP     Dispense Status TR     Blood Expiration Date May 12, 2024 23:59 EDT     PRODUCT BLOOD TYPE 5100     UNIT VOLUME 277    Prepare Plasma: 4 Units   Result Value Ref Range    PRODUCT CODE V7594J73     Unit Number [id number]     Unit ABO O     Unit RH POS     Dispense Status TR     Blood Expiration Date April 22, 2024 09:07 EDT     PRODUCT BLOOD TYPE 5100     UNIT VOLUME 306     PRODUCT CODE P0741O83     Unit Number [id number]     Unit ABO O     Unit RH POS     Dispense Status TR     Blood Expiration Date April 22, 2024 09:07 EDT     PRODUCT BLOOD TYPE 5100     UNIT VOLUME 326     PRODUCT CODE Z4251R44     Unit Number  [id number]     Unit ABO O     Unit RH POS     Dispense Status TR     Blood Expiration Date April 22, 2024 09:07 EDT     PRODUCT BLOOD TYPE 5100     UNIT VOLUME 314    Prepare Cryoprecipitated AHF (Pooled Units): 1 Pools   Result Value Ref Range    PRODUCT CODE B0463X20     Unit Number [id number]     Unit ABO A     Unit RH POS     Dispense Status RE     Blood Expiration Date April 18, 2024 12:00 EDT     PRODUCT BLOOD TYPE 6200     UNIT VOLUME 69    Coagulation Screen   Result Value Ref Range    Protime 16.8 (H) 9.8 - 12.8 seconds    INR 1.5 (H) 0.9 - 1.1    aPTT 37 27 - 38 seconds   Blood Gas Arterial Full Panel   Result Value Ref Range    POCT pH, Arterial 7.47 (H) 7.38 - 7.42 pH    POCT pCO2, Arterial 31 (L) 38 - 42 mm Hg    POCT pO2, Arterial 84 (L) 85 - 95 mm Hg    POCT SO2, Arterial 99 94 - 100 %    POCT Oxy Hemoglobin, Arterial 95.2 94.0 - 98.0 %    POCT Hematocrit Calculated, Arterial 23.0 (L) 41.0 - 52.0 %    POCT Sodium, Arterial 132 (L) 136 - 145 mmol/L    POCT Potassium, Arterial 5.5 (H) 3.5 - 5.3 mmol/L    POCT Chloride, Arterial 101 98 - 107 mmol/L    POCT Ionized Calcium, Arterial 1.14 1.10 - 1.33 mmol/L    POCT Glucose, Arterial 172 (H) 74 - 99 mg/dL    POCT Lactate, Arterial 1.9 0.4 - 2.0 mmol/L    POCT Base Excess, Arterial -0.8 -2.0 - 3.0 mmol/L    POCT HCO3 Calculated, Arterial 22.6 22.0 - 26.0 mmol/L    POCT Hemoglobin, Arterial 7.6 (L) 13.5 - 17.5 g/dL    POCT Anion Gap, Arterial 14 10 - 25 mmo/L    Patient Temperature 37.0 degrees Celsius    FiO2 40 %   POCT GLUCOSE   Result Value Ref Range    POCT Glucose 165 (H) 74 - 99 mg/dL   TEG Clot Global Profile   Result Value Ref Range    R (Reaction Time) K 8.2 4.6 - 9.1 min    K (Clot Kinetics) 2.1 0.8 - 2.1 min    ANGLE 66.0 63.0 - 78.0 deg    MA (Max Amplitude) K 58.0 52.0 - 69.0 mm    R (Reaction Time) KH 9.0 (H) 4.3 - 8.3 min    MA (Max Amplitude) RT 57.0 52.0 - 70.0 mm    MA ( Ramiro Amplitude) FF 17.0 15.0 - 32.0 mm    [facility] 337 345 - 391  mg/dL   Prepare Platelets: 2 Units   Result Value Ref Range    PRODUCT CODE L0969V67     Unit Number [id number]     Unit ABO A     Unit RH POS     Dispense Status TR     Blood Expiration Date April 19, 2024 23:59 EDT     PRODUCT BLOOD TYPE 6200     UNIT VOLUME 366     PRODUCT CODE C4561K24     Unit Number [id number]     Unit ABO A     Unit RH POS     Dispense Status RE     Blood Expiration Date April 19, 2024 23:59 EDT     PRODUCT BLOOD TYPE 6200     UNIT VOLUME 276    CBC   Result Value Ref Range    WBC 6.9 4.4 - 11.3 x10*3/uL    nRBC 0.6 (H) 0.0 - 0.0 /100 WBCs    RBC 2.54 (L) 4.50 - 5.90 x10*6/uL    Hemoglobin 7.1 (L) 13.5 - 17.5 g/dL    Hematocrit 20.7 (L) 41.0 - 52.0 %    MCV 82 80 - 100 fL    MCH 28.0 26.0 - 34.0 pg    MCHC 34.3 32.0 - 36.0 g/dL    RDW 20.8 (H) 11.5 - 14.5 %    Platelets 130 (L) 150 - 450 x10*3/uL   POCT GLUCOSE   Result Value Ref Range    POCT Glucose 132 (H) 74 - 99 mg/dL   CBC   Result Value Ref Range    WBC 7.0 4.4 - 11.3 x10*3/uL    nRBC 0.4 (H) 0.0 - 0.0 /100 WBCs    RBC 2.56 (L) 4.50 - 5.90 x10*6/uL    Hemoglobin 7.2 (L) 13.5 - 17.5 g/dL    Hematocrit 19.8 (L) 41.0 - 52.0 %    MCV 77 (L) 80 - 100 fL    MCH 28.1 26.0 - 34.0 pg    MCHC 36.4 (H) 32.0 - 36.0 g/dL    RDW 19.4 (H) 11.5 - 14.5 %    Platelets 114 (L) 150 - 450 x10*3/uL   Coagulation Screen   Result Value Ref Range    Protime 16.4 (H) 9.8 - 12.8 seconds    INR 1.5 (H) 0.9 - 1.1    aPTT 33 27 - 38 seconds   Fibrinogen   Result Value Ref Range    Fibrinogen 169 (L) 200 - 400 mg/dL   Prepare Plasma: 2 Units   Result Value Ref Range    PRODUCT CODE U9239B62     Unit Number [id number]     Unit ABO O     Unit RH POS     Dispense Status RE     Blood Expiration Date April 22, 2024 09:07 EDT     PRODUCT BLOOD TYPE 5100     UNIT VOLUME 327     PRODUCT CODE O9507X81     Unit Number [id number]     Unit ABO A     Unit RH POS     Dispense Status TR     Blood Expiration Date April 22, 2024 08:45 EDT     PRODUCT BLOOD TYPE  6200     UNIT VOLUME 209    Prepare RBC: 2 Units   Result Value Ref Range    PRODUCT CODE M9507J43     Unit Number [id number]     Unit ABO O     Unit RH POS     XM INTEP COMP     Dispense Status TR     Blood Expiration Date May 09, 2024 23:59 EDT     PRODUCT BLOOD TYPE 5100     UNIT VOLUME 350     PRODUCT CODE C1050D26     Unit Number [id number]     Unit ABO O     Unit RH POS     XM INTEP COMP     Dispense Status XM     Blood Expiration Date May 08, 2024 23:59 EDT     PRODUCT BLOOD TYPE 5100     UNIT VOLUME 350    CBC   Result Value Ref Range    WBC 5.7 4.4 - 11.3 x10*3/uL    nRBC 1.1 (H) 0.0 - 0.0 /100 WBCs    RBC 2.90 (L) 4.50 - 5.90 x10*6/uL    Hemoglobin 8.4 (L) 13.5 - 17.5 g/dL    Hematocrit 22.7 (L) 41.0 - 52.0 %    MCV 78 (L) 80 - 100 fL    MCH 29.0 26.0 - 34.0 pg    MCHC 37.0 (H) 32.0 - 36.0 g/dL    RDW 17.9 (H) 11.5 - 14.5 %    Platelets 122 (L) 150 - 450 x10*3/uL   Prepare RBC: 2 Units   Result Value Ref Range    PRODUCT CODE G4936E97     Unit Number [id number]     Unit ABO O     Unit RH POS     XM INTEP COMP     Dispense Status XM     Blood Expiration Date May 07, 2024 23:59 EDT     PRODUCT BLOOD TYPE 5100     UNIT VOLUME 350     PRODUCT CODE Z7617F15     Unit Number [id number]     Unit ABO O     Unit RH POS     XM INTEP COMP     Dispense Status XM     Blood Expiration Date May 08, 2024 23:59 EDT     PRODUCT BLOOD TYPE 5100     UNIT VOLUME 350    Prepare Plasma: 2 Units   Result Value Ref Range    PRODUCT CODE V5640X55     Unit Number [id number]     Unit ABO B     Unit RH POS     Dispense Status XM     Blood Expiration Date April 19, 2024 10:00 EDT     PRODUCT BLOOD TYPE 7300     UNIT VOLUME 290     PRODUCT CODE V3623Y76     Unit Number [id number]     Unit ABO B     Unit RH POS     Dispense Status XM     Blood Expiration Date April 19, 2024 10:36 EDT     PRODUCT BLOOD TYPE 7300     UNIT VOLUME 291    POCT GLUCOSE   Result Value Ref Range    POCT Glucose 116 (H) 74 - 99 mg/dL    Prepare RBC: 3 Units   Result Value Ref Range    PRODUCT CODE O6869N32     Unit Number [id number]     Unit ABO O     Unit RH POS     XM INTEP COMP     Dispense Status XM     Blood Expiration Date May 05, 2024 23:59 EDT     PRODUCT BLOOD TYPE 5100     UNIT VOLUME 324    Prepare Platelets: 2 Units   Result Value Ref Range    PRODUCT CODE X8153S03     Unit Number [id number]     Unit ABO O     Unit RH POS     Dispense Status RE     Blood Expiration Date April 18, 2024 23:59 EDT     PRODUCT BLOOD TYPE 5100     UNIT VOLUME 275     PRODUCT CODE B5022Z55     Unit Number [id number]     Unit ABO A     Unit RH POS     Dispense Status RE     Blood Expiration Date April 19, 2024 23:59 EDT     PRODUCT BLOOD TYPE 6200     UNIT VOLUME 218    Prepare Cryoprecipitated AHF (Pooled Units): 2 Pools   Result Value Ref Range    PRODUCT CODE X2890C89     Unit Number [id number]     Unit ABO O     Unit RH POS     Dispense Status TR     Blood Expiration Date April 18, 2024 23:00 EDT     PRODUCT BLOOD TYPE 5100     UNIT VOLUME 84    Blood Gas Arterial Full Panel   Result Value Ref Range    POCT pH, Arterial 7.47 (H) 7.38 - 7.42 pH    POCT pCO2, Arterial 30 (L) 38 - 42 mm Hg    POCT pO2, Arterial 74 (L) 85 - 95 mm Hg    POCT SO2, Arterial 97 94 - 100 %    POCT Oxy Hemoglobin, Arterial 93.9 (L) 94.0 - 98.0 %    POCT Hematocrit Calculated, Arterial 28.0 (L) 41.0 - 52.0 %    POCT Sodium, Arterial 133 (L) 136 - 145 mmol/L    POCT Potassium, Arterial 5.2 3.5 - 5.3 mmol/L    POCT Chloride, Arterial 101 98 - 107 mmol/L    POCT Ionized Calcium, Arterial 1.13 1.10 - 1.33 mmol/L    POCT Glucose, Arterial 96 74 - 99 mg/dL    POCT Lactate, Arterial 2.4 (H) 0.4 - 2.0 mmol/L    POCT Base Excess, Arterial -1.3 -2.0 - 3.0 mmol/L    POCT HCO3 Calculated, Arterial 21.8 (L) 22.0 - 26.0 mmol/L    POCT Hemoglobin, Arterial 9.2 (L) 13.5 - 17.5 g/dL    POCT Anion Gap, Arterial 15 10 - 25 mmo/L    Patient Temperature 37.0 degrees Celsius    FiO2  50 %   Blood Gas Arterial   Result Value Ref Range    POCT pH, Arterial 7.47 (H) 7.38 - 7.42 pH    POCT pCO2, Arterial 30 (L) 38 - 42 mm Hg    POCT pO2, Arterial 74 (L) 85 - 95 mm Hg    POCT SO2, Arterial 97 94 - 100 %    POCT Oxy Hemoglobin, Arterial 93.9 (L) 94.0 - 98.0 %    POCT Base Excess, Arterial -1.3 -2.0 - 3.0 mmol/L    POCT HCO3 Calculated, Arterial 21.8 (L) 22.0 - 26.0 mmol/L    Patient Temperature 37.0 degrees Celsius    FiO2 50 %   Coagulation Screen   Result Value Ref Range    Protime 15.1 (H) 9.8 - 12.8 seconds    INR 1.3 (H) 0.9 - 1.1    aPTT 32 27 - 38 seconds   Blood Gas Arterial Full Panel   Result Value Ref Range    POCT pH, Arterial 7.47 (H) 7.38 - 7.42 pH    POCT pCO2, Arterial 29 (L) 38 - 42 mm Hg    POCT pO2, Arterial 73 (L) 85 - 95 mm Hg    POCT SO2, Arterial 97 94 - 100 %    POCT Oxy Hemoglobin, Arterial 93.9 (L) 94.0 - 98.0 %    POCT Hematocrit Calculated, Arterial 29.0 (L) 41.0 - 52.0 %    POCT Sodium, Arterial 134 (L) 136 - 145 mmol/L    POCT Potassium, Arterial 5.0 3.5 - 5.3 mmol/L    POCT Chloride, Arterial 101 98 - 107 mmol/L    POCT Ionized Calcium, Arterial 1.13 1.10 - 1.33 mmol/L    POCT Glucose, Arterial 94 74 - 99 mg/dL    POCT Lactate, Arterial 2.2 (H) 0.4 - 2.0 mmol/L    POCT Base Excess, Arterial -1.9 -2.0 - 3.0 mmol/L    POCT HCO3 Calculated, Arterial 21.1 (L) 22.0 - 26.0 mmol/L    POCT Hemoglobin, Arterial 9.8 (L) 13.5 - 17.5 g/dL    POCT Anion Gap, Arterial 17 10 - 25 mmo/L    Patient Temperature 37.0 degrees Celsius    FiO2 50 %   CBC   Result Value Ref Range    WBC 7.9 4.4 - 11.3 x10*3/uL    nRBC 0.6 (H) 0.0 - 0.0 /100 WBCs    RBC 3.02 (L) 4.50 - 5.90 x10*6/uL    Hemoglobin 8.8 (L) 13.5 - 17.5 g/dL    Hematocrit 23.3 (L) 41.0 - 52.0 %    MCV 77 (L) 80 - 100 fL    MCH 29.1 26.0 - 34.0 pg    MCHC 37.8 (H) 32.0 - 36.0 g/dL    RDW 17.9 (H) 11.5 - 14.5 %    Platelets 120 (L) 150 - 450 x10*3/uL   PST Top   Result Value Ref Range    Extra Tube Hold for add-ons.    Blood Gas  Arterial Full Panel   Result Value Ref Range    POCT pH, Arterial 7.26 (L) 7.38 - 7.42 pH    POCT pCO2, Arterial 52 (H) 38 - 42 mm Hg    POCT pO2, Arterial 61 (L) 85 - 95 mm Hg    POCT SO2, Arterial 90 (L) 94 - 100 %    POCT Oxy Hemoglobin, Arterial 87.4 (L) 94.0 - 98.0 %    POCT Hematocrit Calculated, Arterial 29.0 (L) 41.0 - 52.0 %    POCT Sodium, Arterial 133 (L) 136 - 145 mmol/L    POCT Potassium, Arterial 4.9 3.5 - 5.3 mmol/L    POCT Chloride, Arterial 102 98 - 107 mmol/L    POCT Ionized Calcium, Arterial 1.13 1.10 - 1.33 mmol/L    POCT Glucose, Arterial 94 74 - 99 mg/dL    POCT Lactate, Arterial 2.0 0.4 - 2.0 mmol/L    POCT Base Excess, Arterial -3.9 (L) -2.0 - 3.0 mmol/L    POCT HCO3 Calculated, Arterial 23.3 22.0 - 26.0 mmol/L    POCT Hemoglobin, Arterial 9.5 (L) 13.5 - 17.5 g/dL    POCT Anion Gap, Arterial 13 10 - 25 mmo/L    Patient Temperature 37.0 degrees Celsius    FiO2 100 %   Blood Gas Arterial Full Panel   Result Value Ref Range    POCT pH, Arterial 7.24 (LL) 7.38 - 7.42 pH    POCT pCO2, Arterial 51 (H) 38 - 42 mm Hg    POCT pO2, Arterial 91 85 - 95 mm Hg    POCT SO2, Arterial 98 94 - 100 %    POCT Oxy Hemoglobin, Arterial 94.3 94.0 - 98.0 %    POCT Hematocrit Calculated, Arterial 31.0 (L) 41.0 - 52.0 %    POCT Sodium, Arterial 133 (L) 136 - 145 mmol/L    POCT Potassium, Arterial 5.3 3.5 - 5.3 mmol/L    POCT Chloride, Arterial 103 98 - 107 mmol/L    POCT Ionized Calcium, Arterial 1.12 1.10 - 1.33 mmol/L    POCT Glucose, Arterial 76 74 - 99 mg/dL    POCT Lactate, Arterial 2.1 (H) 0.4 - 2.0 mmol/L    POCT Base Excess, Arterial -5.6 (L) -2.0 - 3.0 mmol/L    POCT HCO3 Calculated, Arterial 21.9 (L) 22.0 - 26.0 mmol/L    POCT Hemoglobin, Arterial 10.4 (L) 13.5 - 17.5 g/dL    POCT Anion Gap, Arterial 13 10 - 25 mmo/L    Patient Temperature 37.0 degrees Celsius    FiO2 50 %   Magnesium   Result Value Ref Range    Magnesium 2.24 1.60 - 2.40 mg/dL   Hepatic function panel   Result Value Ref Range    Albumin  3.2 (L) 3.4 - 5.0 g/dL    Bilirubin, Total 20.3 (H) 0.0 - 1.2 mg/dL    Bilirubin, Direct 21.3 (H) 0.0 - 0.3 mg/dL    Alkaline Phosphatase 128 33 - 136 U/L    ALT 5 (L) 10 - 52 U/L    AST 91 (H) 9 - 39 U/L    Total Protein 4.6 (L) 6.4 - 8.2 g/dL   BUN   Result Value Ref Range    Urea Nitrogen 99 (HH) 6 - 23 mg/dL   Creatinine, Serum   Result Value Ref Range    Creatinine 2.88 (H) 0.50 - 1.30 mg/dL    eGFR 24 (L) >60 mL/min/1.73m*2   Electrolyte panel   Result Value Ref Range    Sodium 138 136 - 145 mmol/L    Potassium 5.1 3.5 - 5.3 mmol/L    Chloride 101 98 - 107 mmol/L    Bicarbonate 22 21 - 32 mmol/L    Anion Gap 20 10 - 20 mmol/L   Calcium, ionized   Result Value Ref Range    POCT Calcium, Ionized 1.05 (L) 1.1 - 1.33 mmol/L   Phosphorus   Result Value Ref Range    Phosphorus 7.2 (H) 2.5 - 4.9 mg/dL   CBC   Result Value Ref Range    WBC 10.9 4.4 - 11.3 x10*3/uL    nRBC 0.8 (H) 0.0 - 0.0 /100 WBCs    RBC 3.45 (L) 4.50 - 5.90 x10*6/uL    Hemoglobin 10.0 (L) 13.5 - 17.5 g/dL    Hematocrit 26.9 (L) 41.0 - 52.0 %    MCV 78 (L) 80 - 100 fL    MCH 29.0 26.0 - 34.0 pg    MCHC 37.2 (H) 32.0 - 36.0 g/dL    RDW 18.0 (H) 11.5 - 14.5 %    Platelets 141 (L) 150 - 450 x10*3/uL   Coagulation Screen   Result Value Ref Range    Protime 15.2 (H) 9.8 - 12.8 seconds    INR 1.3 (H) 0.9 - 1.1    aPTT 33 27 - 38 seconds   Basic Metabolic Panel   Result Value Ref Range    Glucose 68 (L) 74 - 99 mg/dL    Sodium 138 136 - 145 mmol/L    Potassium 5.1 3.5 - 5.3 mmol/L    Chloride 101 98 - 107 mmol/L    Bicarbonate 22 21 - 32 mmol/L    Anion Gap 20 10 - 20 mmol/L    Urea Nitrogen 99 (HH) 6 - 23 mg/dL    Creatinine 2.88 (H) 0.50 - 1.30 mg/dL    eGFR 24 (L) >60 mL/min/1.73m*2    Calcium 7.7 (L) 8.6 - 10.6 mg/dL   Fibrinogen   Result Value Ref Range    Fibrinogen 174 (L) 200 - 400 mg/dL   TEG Clot Lysis Profile   Result Value Ref Range    R (Reaction Time) K 7.1 4.6 - 9.1 min    LY30 (Lysis) 0.0 0.0 - 2.6 %    MA (Max Amplitude) RT 56.0 52.0 -  70.0 mm    MA ( Ramiro Amplitude) FF 17.0 15.0 - 32.0 mm   POCT GLUCOSE   Result Value Ref Range    POCT Glucose 77 74 - 99 mg/dL   Blood Gas Lactic Acid, Venous   Result Value Ref Range    POCT Lactate, Venous 2.0 0.4 - 2.0 mmol/L         Assessment:   S/P I&D of right hip with evacuation of hematoma, closure and incisional wound vac replacement with Dr. Smith on evening of 4/18.    Plan:  Maintain incisional wound vac to R posterior thigh wound site per plastic surgery x14 (5/2) days post-op         ·  Settings: -50 mmHg low continuous suction        ·  Monitor/record vac canister output [id number]       ·  Notify plastic surgery with any concerns of leak or obstruction  - Continue ANDERSON drain care to x3 drains present at R thigh flap reconstruction site      ·  Strip drain tubing TID and PRN     ·  Monitor and record output q8h      ·  Keep drain sites C/D/I      ·  Notify plastics if ANDERSON drain output exceeds > 300 ml/hr (continuing to monitor closely)   - Avoid pressure application or positioning onto flap site or incisions at R upper leg   - Recommend patient be positioned off of R side as able to prevent flap compression/wound breakdown   - Continue clinitron fluidized air mattress  - Appreciate remaining supportive care per ICU   - Plastic Surgery will continue to follow     PHILLIP Dalal-CNP  Plastic and Reconstructive Surgery   Available via Epic chat, pager: [phone] or team phones: [phone]

## 2024-04-19 NOTE — SIGNIFICANT EVENT
Return from OR: Patient to OR this evening with plastic surgery for washout and wound VAC placement of right gluteal wound. No active source of bleeding identified. Blood loss thought to be from diffuse muscular bleeding.    Intra-op events:  No additional access established  1200mL cell saver given reportedly from hematoma   1pRBC given    Patient was hemodynamically stable without pressor support throughout     Updated Assessment and Plan:    Jason Hollins is a 63 y.o. 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 Hawkins and Dr. Smith on 3/5/24.  Post operative course was uncomplicated and patient was on RNF until 3/20 for prolonged bed rest to avoid hip flexion to maintain flap integrity.  Patient was on prophylactic lovenox while on bedrest.      3/20: Cardiopulmonary arrest s/p CPR with ROSC after TPA, overnight started on heparin, epo, increased pressor requirements, increased swelling at flap site.      3/21: Hemorrhagic shock, MTP. Firm and large right flap site. Return to OR s/p Exploration of right thigh wound, Evacuation of hematoma. Suture ligation of multiple bleeding vessel and several points in gluteal area.      4/1: s/p Trach     4/4: return OR with ENT s/p laryngoscopy, esophagoscopy and bronchoscopy, trach revision. Found extensive clot burden in esophagus and stomach as well as in the lungs. Oozing at thyroid bed cauterized. Trach exchanged to new 6.0 prox XLT elvie. OR findings:  blood up glottis and from thyroid bed to airway.     4/9: Patient is medically stable for OR on 4/11/24.  He is appropriately n.p.o. since midnight and has had more DVT prophylaxis held for OR today.     4/18: Patient bleeding into RLE given increased ANDERSON drain output and non-incrementing Hgb despite transfusion. 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 Hawkins and Dr. Smith on 3/5/24 s/p cardiopulmonary arrest with ROSC 3/20. CT head 3/22 without acute process. Weaned off cisatracurium and propofol overnight 3/23-3/24. Ketamine weaned off 3/25 and Fentanyl weaned off 3/26 am. Repeat CT Head 3/26 without acute process. MRI Brain 3/30, negative for cerebral infarction or hemorrhage. Neuro exam - following commands except for RLE, tracking, nodding/shaking head to questions  - PRN Oxycodone and Tylenol continued  - PRN tylenol   -Dilaudid PRN   - ongoing neuro and pain assessments  - PT/OT -> AROM     CV: History of HTN, HLD. Acute cardiopulmonary arrest 2/2 to possible massive PE vs massive STEMI, received multiple rounds of CPR and one defibrillation.  Sustained ROSC achieved after TPA bolus. On high dose levophed, epinephrine, vaso, angioT2. Plan on 3/21 was for ECMO which was aborted secondary to large hemhorrge at right flap site. Had MTP and taken OR with 5L evacuated. ECHO 3/21: Normal LV, Mod enlarged RV, slight suggestion of a Townsend's sign / RV strain, low normal RV function. ECHO 3/22 with hyperdynamic LV. New onset Afib with RVR overnight 3/22-3/23, dosed with 150mg amio x2, started infusion at 1mg/min thereafter. AT2 weaned off. Amio infusion discontinued 3/25. Lactate downtrending. Vaso and epi weaned off. Remains in NSR. iEpo weaned off 3/27. Repeat TTE 3/29 and 4/2 essentially unchanged. Levo resumed 4/2 evening to continue aggressive fluid removal. Repeat TTE on 4/10 with LVEF 65-70% and RV difficulty to assess. Patient HDS without pressors on 4/18. Blood transfusion ongoing for resuscitation.  - Ordered 25% Albumin 25 g  - continuous EKG/abp/cvp monitoring  - Goal map range 65-90  -  Continue midodrine dose to 10 mg 3 times daily  - Continue florinef 0.1mg BID   - Continue stress dose steroids to 50 mg q 8h   - Off Levo since 4/13     PULM: Arrived to SICU intubated julio c-CPR. Concern for massive PE. Started on iEpo, currently on 0.05. Hypoxic respiratory failure. Severe ARDS. ETT exchanged 3/23. CT Chest 3/26 with interval JOHN consolidative/ground glass opacity. S/p Tracheostomy on 4/1. Bedside bronch -> some blood in trach, posterior wall tissue just distal to trach tip appears to be collapsing on cannula.   - Continue SIMV  - ABGs prn  - additional pulm toilet prn -will discuss regimen with RT  - daily CXR -chest x-ray today indicates interval increase in right-greater-than-left pulmonary  opacities. Noticeable R sided pleural effusion     ENT: Had epistaxis 3/23, completed afrin bid x3 days. S/p trach on 4/1. Bleeding from trach site 4/2 am, packing placed by ENT. Repeat episode of bloody tracheal secretions 4/3 through this am. Taken back to OR with ENT 4/4 as per above.  -Had Slight bleeding from trach site noted per bedside nurse, however he was evaluated by ENT and they mentioned there was no evidence of bleeding in the patient's oropharynx and its most likely because of traumatic suctioning.   - Today: no bleeding noted around trach site        GI: NPO. Shock liver, pancreatitis. Elevated TG. Elevated lactate. Consulted ACS for potential bowel ischemia as cause of persistent lactic acidosis. CT imaging 3/22 with evidence of pancreatitis. No acute surgical indication per ACS. RUQ US 3/22 with thickening of GB wall without cholelithiasis. CT A/P 3/26 negative for acute bleed. LFTs downtrending. Worsening hyperbilirubinemia. Amylase and lipase now WNL. Repeat RUQ US 4/1 with nonspecific gallbladder wall edema and thickening which may be 2/2 generalized fluid overload, mild hepatomegaly with slight nodular contour and c/f steatosis and fibrosis, irregular hypoechoic region adjacent to  hepatic veins. US liver with doppler 4/2 revealed hepatomegaly with nodular contour, mildly elevated RI in main and left hepatic arteries. Hyperbilirubinemia  - Still has diarrheal episodes   - On Simethicone for gaseous distention on KUB  - Tube feeds to be restarted post-operatively  - PPI BID given potential GI bleeding (elevated BUN)  - Trend LFTs daily -AST/ALT/total bilirubin is approximately similar to prior days.  Per discussion with hepatology, minor fluctuations are to be expected prior to eventual downtrend     : No history of renal disease. Baseline creatinine 0.6. Anuric RUTH. Elevated CK, downtrending. Metabolic acidosis, total body fluid overload, hyperkalemia. Started on CVVH 3/21, stopped bicarb infusion 3/22. Marino removed 3/24. Hyponatremia resolved. Stopped CVVH 4/2.  Has been tolerating iHD. Net +ve 1467.   - Nephrology following   - RFP BID and PRN  - Replete electrolytes judiciously     HEME: Patient was on ppx lovenox for DVT prophylaxis prior to cardiopulmonary arrest. Concern for massive PE patient received bolus of TPA during CPR. Started on heparin infusion overnight given concern for PE. Hemorrhagic shock 3/21, MTP and back to OR s/p Exploration of right thigh woundEvacuation of hematoma. Suture ligation of multiple bleeding vessel and several points in gluteal area. Hematology consulted 3/22 to r/o HLH. Transfused 1 RBC overnight 3/22-3/23. Thrombocytopenia, coagulapathy, hypofibrinogenemia. LE Duplex 3/25 +acute occlusive R soleal DVT. Received 2u PRBC and 1u FFP on 3/26. Heparin infusion discontinued 3/26 to r/o HIT and transitioned to bival. PF4 negative, resumed heparin infuision 3/27. Elevated IL2R and decreased NK function. C/f HLH (low suspicion), empirically treated with decadron (3/28-3/31). Thrombocytopenia on 3/30 to 18k, received 5pk, did not increment. Heparin held. Thrombocytopenia likely 2/2 to consumptive process, hematology following. Received IVIG and 5pk plts 3/31.  Pancytopenia persists, improving thrombocytopenia. On 4/18 increased bleeding noted into RLE. Blood transfusion ongoing and Hep gtt held  - cbc, coags bid and prn  - Heparin to remain off  - Received 1pRBC intra-op  - Follow post op TEG, fibrinogen, coags  - close surveillance of RLE and drains  - maintain active T&S (4/18)     ENDO: History of DM2. A1c 7.5%. TSH WNL 1/2024. Hyperglycemia  - Lantus halved 2/2 tube feeds on hold  - q4h accuchecks and SSI #4     ID: Leukocytosis resolved, now with leukopenia. On broad antimicrobial therapy. MRSA screen + 2/28/24. Pan cultures sent 3/22. Sputum NTF, +MRSA screen (completed 5 day course mupirocin), UCx and BCx negative. Completed 7 day course vanc/zosyn 3/27. Febrile to 39.1 4/3, resent blood cultures and BAL and started vanco and zosyn. Switched zosyn to reynaldo, kept on vanco, added john. Blood cultures and sputum with Klebsiella. Repeat blood cultures sent 4/4.  - F/U cultures - Klebsiella grown in wound culture - susceptible to Meropenem per ID  - temp q4h, wbc daily  - Tissue cultures growing Klebsiella  - Per ID - continue Meropenem 1g daily with end date 4/24  - If continues to be hypotensive consider broaden abx/ consider adding antifungal     Lines:  - L internal jugular trialysis (4/18)  - right radial a line (4/5)  - PIV x3  - Pending IR for tunneled line - will re-engage when patient clinically stable     Patient seen and discussed with Dr. Ness.     Dispo: Going to OR today per plastics team. Continued care with SICU.

## 2024-04-19 NOTE — PROGRESS NOTES
"Jason Hollins is a 63 y.o. male on day 45 of admission presenting with Soft tissue sarcoma (Multi).    Subjective   Overnight, patient taken to OR (bleeding likely muscular per report from plastics). Received 1200cc from cellsaver and 1u pRBC intraoperatively. Brought back to SICU without pressor needs. Oxygenation was monitored closely intra-op given that patient had O2 saturation in 80s while on FiO2 100%, PEEP 10. Oxygenation not issue since transfer to SICU.     This morning, patient is reponsive to commands (at baseline) and does not report pain.     ANDERSON drains with approx 250cc output since operation last night. No increased concerns for swelling/bleeding this morning. Patient on CVVH for filtration.    Hypoglycemic overnight and this AM (lowest glucose 53). Given 1/2 amp D50 x2. All insulin held this AM.       Objective     Physical Exam  Constitutional:       General: He is not in acute distress.  HENT:      Head:      Comments: Left internal jugular trialysis line in place  Eyes:      Extraocular Movements: Extraocular movements intact.   Cardiovascular:      Rate and Rhythm: Normal rate and regular rhythm.   Pulmonary:      Comments: Diffuse inspiratory crackles and diminished breath sounds bilaterally  Abdominal:      General: Bowel sounds are normal.      Palpations: Abdomen is soft.   Musculoskeletal:      Comments: 3+ pitting edema noted bilaterally in upper and lower extremities. R thigh with mild swelling (improved from prior day) and with surgical incision sites clean, dry, intact. 3 ANDERSON drains and wound vac with minimal output this morning.   Skin:     General: Skin is warm.   Neurological:      Mental Status: He is alert. Mental status is at baseline.         Last Recorded Vitals  Blood pressure 102/58, pulse 60, temperature 36.5 °C (97.7 °F), temperature source Temporal, resp. rate 18, height 1.778 m (5' 10\"), weight 119 kg (262 lb 5.6 oz), SpO2 99%.  Intake/Output last 3 Shifts:  I/O last 3 completed " shifts:  In: 6885.7 (57.9 mL/kg) [I.V.:339.7 (2.9 mL/kg); Blood:6026; NG/GT:420; IV Piggyback:100]  Out: 2932.5 (24.6 mL/kg) [Urine:963 (0.2 mL/kg/hr); Drains:1967.5; Other:2]  Weight: 119 kg     Relevant Results  Scheduled medications  albumin human, 25 g, intravenous, q8h  fludrocortisone, 0.1 mg, nasogastric tube, q12h  hydrocortisone sodium succinate, 50 mg, intravenous, q8h  [Held by provider] insulin glargine, 12 Units, subcutaneous, q12h  [Held by provider] insulin lispro, 0-20 Units, subcutaneous, q4h  meropenem, 1 g, intravenous, q24h  midodrine, 10 mg, orogastric tube, q8h  oxygen, , inhalation, Continuous - Inhalation  pantoprazole, 40 mg, intravenous, BID      Continuous medications  lactated Ringer's, 5 mL/hr, Last Rate: 5 mL/hr (04/19/24 0400)  PrismaSol 4/2.5, 3,000 mL/hr, Last Rate: 3,000 mL/hr (04/18/24 1600)      PRN medications  PRN medications: acetaminophen, alteplase, alteplase, calcium gluconate, calcium gluconate, dextrose, glucagon, heparin, heparin, HYDROmorphone, HYDROmorphone, labetaloL, magnesium sulfate, magnesium sulfate, midodrine, oxyCODONE, oxyCODONE, sodium chloride  Results for orders placed or performed during the hospital encounter of 03/05/24 (from the past 24 hour(s))   POCT GLUCOSE   Result Value Ref Range    POCT Glucose 132 (H) 74 - 99 mg/dL   CBC   Result Value Ref Range    WBC 7.0 4.4 - 11.3 x10*3/uL    nRBC 0.4 (H) 0.0 - 0.0 /100 WBCs    RBC 2.56 (L) 4.50 - 5.90 x10*6/uL    Hemoglobin 7.2 (L) 13.5 - 17.5 g/dL    Hematocrit 19.8 (L) 41.0 - 52.0 %    MCV 77 (L) 80 - 100 fL    MCH 28.1 26.0 - 34.0 pg    MCHC 36.4 (H) 32.0 - 36.0 g/dL    RDW 19.4 (H) 11.5 - 14.5 %    Platelets 114 (L) 150 - 450 x10*3/uL   Coagulation Screen   Result Value Ref Range    Protime 16.4 (H) 9.8 - 12.8 seconds    INR 1.5 (H) 0.9 - 1.1    aPTT 33 27 - 38 seconds   Fibrinogen   Result Value Ref Range    Fibrinogen 169 (L) 200 - 400 mg/dL   Prepare Plasma: 2 Units   Result Value Ref Range    PRODUCT  CODE F8217Z61     Unit Number [id number]     Unit ABO O     Unit RH POS     Dispense Status RE     Blood Expiration Date April 22, 2024 09:07 EDT     PRODUCT BLOOD TYPE 5100     UNIT VOLUME 327     PRODUCT CODE M4161C69     Unit Number [id number]     Unit ABO A     Unit RH POS     Dispense Status TR     Blood Expiration Date April 22, 2024 08:45 EDT     PRODUCT BLOOD TYPE 6200     UNIT VOLUME 209    Prepare RBC: 2 Units   Result Value Ref Range    PRODUCT CODE N1548U92     Unit Number [id number]     Unit ABO O     Unit RH POS     XM INTEP COMP     Dispense Status TR     Blood Expiration Date May 09, 2024 23:59 EDT     PRODUCT BLOOD TYPE 5100     UNIT VOLUME 350     PRODUCT CODE N1483A29     Unit Number [id number]     Unit ABO O     Unit RH POS     XM INTEP COMP     Dispense Status XM     Blood Expiration Date May 08, 2024 23:59 EDT     PRODUCT BLOOD TYPE 5100     UNIT VOLUME 350    CBC   Result Value Ref Range    WBC 5.7 4.4 - 11.3 x10*3/uL    nRBC 1.1 (H) 0.0 - 0.0 /100 WBCs    RBC 2.90 (L) 4.50 - 5.90 x10*6/uL    Hemoglobin 8.4 (L) 13.5 - 17.5 g/dL    Hematocrit 22.7 (L) 41.0 - 52.0 %    MCV 78 (L) 80 - 100 fL    MCH 29.0 26.0 - 34.0 pg    MCHC 37.0 (H) 32.0 - 36.0 g/dL    RDW 17.9 (H) 11.5 - 14.5 %    Platelets 122 (L) 150 - 450 x10*3/uL   Prepare RBC: 2 Units   Result Value Ref Range    PRODUCT CODE N9722G19     Unit Number [id number]     Unit ABO O     Unit RH POS     XM INTEP COMP     Dispense Status XM     Blood Expiration Date May 07, 2024 23:59 EDT     PRODUCT BLOOD TYPE 5100     UNIT VOLUME 350     PRODUCT CODE G4804U44     Unit Number [id number]     Unit ABO O     Unit RH POS     XM INTEP COMP     Dispense Status XM     Blood Expiration Date May 08, 2024 23:59 EDT     PRODUCT BLOOD TYPE 5100     UNIT VOLUME 350    Prepare Plasma: 2 Units   Result Value Ref Range    PRODUCT CODE Y1072X36     Unit Number [id number]     Unit ABO B     Unit RH POS     Dispense Status RE      Blood Expiration Date April 19, 2024 10:00 EDT     PRODUCT BLOOD TYPE 7300     UNIT VOLUME 290     PRODUCT CODE D9384E51     Unit Number [id number]     Unit ABO B     Unit RH POS     Dispense Status RE     Blood Expiration Date April 19, 2024 10:36 EDT     PRODUCT BLOOD TYPE 7300     UNIT VOLUME 291    POCT GLUCOSE   Result Value Ref Range    POCT Glucose 116 (H) 74 - 99 mg/dL   Prepare RBC: 3 Units   Result Value Ref Range    PRODUCT CODE M9696U38     Unit Number [id number]     Unit ABO O     Unit RH POS     XM INTEP COMP     Dispense Status XM     Blood Expiration Date May 05, 2024 23:59 EDT     PRODUCT BLOOD TYPE 5100     UNIT VOLUME 324    Prepare Platelets: 2 Units   Result Value Ref Range    PRODUCT CODE F2156Q77     Unit Number [id number]     Unit ABO O     Unit RH POS     Dispense Status RE     Blood Expiration Date April 18, 2024 23:59 EDT     PRODUCT BLOOD TYPE 5100     UNIT VOLUME 275     PRODUCT CODE W6323T51     Unit Number [id number]     Unit ABO A     Unit RH POS     Dispense Status RE     Blood Expiration Date April 19, 2024 23:59 EDT     PRODUCT BLOOD TYPE 6200     UNIT VOLUME 218    Prepare Cryoprecipitated AHF (Pooled Units): 2 Pools   Result Value Ref Range    PRODUCT CODE B0686L29     Unit Number [id number]     Unit ABO O     Unit RH POS     Dispense Status TR     Blood Expiration Date April 18, 2024 23:00 EDT     PRODUCT BLOOD TYPE 5100     UNIT VOLUME 84    Blood Gas Arterial Full Panel   Result Value Ref Range    POCT pH, Arterial 7.47 (H) 7.38 - 7.42 pH    POCT pCO2, Arterial 30 (L) 38 - 42 mm Hg    POCT pO2, Arterial 74 (L) 85 - 95 mm Hg    POCT SO2, Arterial 97 94 - 100 %    POCT Oxy Hemoglobin, Arterial 93.9 (L) 94.0 - 98.0 %    POCT Hematocrit Calculated, Arterial 28.0 (L) 41.0 - 52.0 %    POCT Sodium, Arterial 133 (L) 136 - 145 mmol/L    POCT Potassium, Arterial 5.2 3.5 - 5.3 mmol/L    POCT Chloride, Arterial 101 98 - 107 mmol/L    POCT Ionized Calcium, Arterial  1.13 1.10 - 1.33 mmol/L    POCT Glucose, Arterial 96 74 - 99 mg/dL    POCT Lactate, Arterial 2.4 (H) 0.4 - 2.0 mmol/L    POCT Base Excess, Arterial -1.3 -2.0 - 3.0 mmol/L    POCT HCO3 Calculated, Arterial 21.8 (L) 22.0 - 26.0 mmol/L    POCT Hemoglobin, Arterial 9.2 (L) 13.5 - 17.5 g/dL    POCT Anion Gap, Arterial 15 10 - 25 mmo/L    Patient Temperature 37.0 degrees Celsius    FiO2 50 %   Blood Gas Arterial   Result Value Ref Range    POCT pH, Arterial 7.47 (H) 7.38 - 7.42 pH    POCT pCO2, Arterial 30 (L) 38 - 42 mm Hg    POCT pO2, Arterial 74 (L) 85 - 95 mm Hg    POCT SO2, Arterial 97 94 - 100 %    POCT Oxy Hemoglobin, Arterial 93.9 (L) 94.0 - 98.0 %    POCT Base Excess, Arterial -1.3 -2.0 - 3.0 mmol/L    POCT HCO3 Calculated, Arterial 21.8 (L) 22.0 - 26.0 mmol/L    Patient Temperature 37.0 degrees Celsius    FiO2 50 %   Coagulation Screen   Result Value Ref Range    Protime 15.1 (H) 9.8 - 12.8 seconds    INR 1.3 (H) 0.9 - 1.1    aPTT 32 27 - 38 seconds   Blood Gas Arterial Full Panel   Result Value Ref Range    POCT pH, Arterial 7.47 (H) 7.38 - 7.42 pH    POCT pCO2, Arterial 29 (L) 38 - 42 mm Hg    POCT pO2, Arterial 73 (L) 85 - 95 mm Hg    POCT SO2, Arterial 97 94 - 100 %    POCT Oxy Hemoglobin, Arterial 93.9 (L) 94.0 - 98.0 %    POCT Hematocrit Calculated, Arterial 29.0 (L) 41.0 - 52.0 %    POCT Sodium, Arterial 134 (L) 136 - 145 mmol/L    POCT Potassium, Arterial 5.0 3.5 - 5.3 mmol/L    POCT Chloride, Arterial 101 98 - 107 mmol/L    POCT Ionized Calcium, Arterial 1.13 1.10 - 1.33 mmol/L    POCT Glucose, Arterial 94 74 - 99 mg/dL    POCT Lactate, Arterial 2.2 (H) 0.4 - 2.0 mmol/L    POCT Base Excess, Arterial -1.9 -2.0 - 3.0 mmol/L    POCT HCO3 Calculated, Arterial 21.1 (L) 22.0 - 26.0 mmol/L    POCT Hemoglobin, Arterial 9.8 (L) 13.5 - 17.5 g/dL    POCT Anion Gap, Arterial 17 10 - 25 mmo/L    Patient Temperature 37.0 degrees Celsius    FiO2 50 %   CBC   Result Value Ref Range    WBC 7.9 4.4 - 11.3 x10*3/uL     nRBC 0.6 (H) 0.0 - 0.0 /100 WBCs    RBC 3.02 (L) 4.50 - 5.90 x10*6/uL    Hemoglobin 8.8 (L) 13.5 - 17.5 g/dL    Hematocrit 23.3 (L) 41.0 - 52.0 %    MCV 77 (L) 80 - 100 fL    MCH 29.1 26.0 - 34.0 pg    MCHC 37.8 (H) 32.0 - 36.0 g/dL    RDW 17.9 (H) 11.5 - 14.5 %    Platelets 120 (L) 150 - 450 x10*3/uL   PST Top   Result Value Ref Range    Extra Tube Hold for add-ons.    Blood Gas Arterial Full Panel   Result Value Ref Range    POCT pH, Arterial 7.26 (L) 7.38 - 7.42 pH    POCT pCO2, Arterial 52 (H) 38 - 42 mm Hg    POCT pO2, Arterial 61 (L) 85 - 95 mm Hg    POCT SO2, Arterial 90 (L) 94 - 100 %    POCT Oxy Hemoglobin, Arterial 87.4 (L) 94.0 - 98.0 %    POCT Hematocrit Calculated, Arterial 29.0 (L) 41.0 - 52.0 %    POCT Sodium, Arterial 133 (L) 136 - 145 mmol/L    POCT Potassium, Arterial 4.9 3.5 - 5.3 mmol/L    POCT Chloride, Arterial 102 98 - 107 mmol/L    POCT Ionized Calcium, Arterial 1.13 1.10 - 1.33 mmol/L    POCT Glucose, Arterial 94 74 - 99 mg/dL    POCT Lactate, Arterial 2.0 0.4 - 2.0 mmol/L    POCT Base Excess, Arterial -3.9 (L) -2.0 - 3.0 mmol/L    POCT HCO3 Calculated, Arterial 23.3 22.0 - 26.0 mmol/L    POCT Hemoglobin, Arterial 9.5 (L) 13.5 - 17.5 g/dL    POCT Anion Gap, Arterial 13 10 - 25 mmo/L    Patient Temperature 37.0 degrees Celsius    FiO2 100 %   Blood Gas Arterial Full Panel   Result Value Ref Range    POCT pH, Arterial 7.24 (LL) 7.38 - 7.42 pH    POCT pCO2, Arterial 51 (H) 38 - 42 mm Hg    POCT pO2, Arterial 91 85 - 95 mm Hg    POCT SO2, Arterial 98 94 - 100 %    POCT Oxy Hemoglobin, Arterial 94.3 94.0 - 98.0 %    POCT Hematocrit Calculated, Arterial 31.0 (L) 41.0 - 52.0 %    POCT Sodium, Arterial 133 (L) 136 - 145 mmol/L    POCT Potassium, Arterial 5.3 3.5 - 5.3 mmol/L    POCT Chloride, Arterial 103 98 - 107 mmol/L    POCT Ionized Calcium, Arterial 1.12 1.10 - 1.33 mmol/L    POCT Glucose, Arterial 76 74 - 99 mg/dL    POCT Lactate, Arterial 2.1 (H) 0.4 - 2.0 mmol/L    POCT Base Excess, Arterial  -5.6 (L) -2.0 - 3.0 mmol/L    POCT HCO3 Calculated, Arterial 21.9 (L) 22.0 - 26.0 mmol/L    POCT Hemoglobin, Arterial 10.4 (L) 13.5 - 17.5 g/dL    POCT Anion Gap, Arterial 13 10 - 25 mmo/L    Patient Temperature 37.0 degrees Celsius    FiO2 50 %   Magnesium   Result Value Ref Range    Magnesium 2.24 1.60 - 2.40 mg/dL   Hepatic function panel   Result Value Ref Range    Albumin 3.2 (L) 3.4 - 5.0 g/dL    Bilirubin, Total 20.3 (H) 0.0 - 1.2 mg/dL    Bilirubin, Direct 21.3 (H) 0.0 - 0.3 mg/dL    Alkaline Phosphatase 128 33 - 136 U/L    ALT 5 (L) 10 - 52 U/L    AST 91 (H) 9 - 39 U/L    Total Protein 4.6 (L) 6.4 - 8.2 g/dL   BUN   Result Value Ref Range    Urea Nitrogen 99 (HH) 6 - 23 mg/dL   Creatinine, Serum   Result Value Ref Range    Creatinine 2.88 (H) 0.50 - 1.30 mg/dL    eGFR 24 (L) >60 mL/min/1.73m*2   Electrolyte panel   Result Value Ref Range    Sodium 138 136 - 145 mmol/L    Potassium 5.1 3.5 - 5.3 mmol/L    Chloride 101 98 - 107 mmol/L    Bicarbonate 22 21 - 32 mmol/L    Anion Gap 20 10 - 20 mmol/L   Calcium, ionized   Result Value Ref Range    POCT Calcium, Ionized 1.05 (L) 1.1 - 1.33 mmol/L   Phosphorus   Result Value Ref Range    Phosphorus 7.2 (H) 2.5 - 4.9 mg/dL   CBC   Result Value Ref Range    WBC 10.9 4.4 - 11.3 x10*3/uL    nRBC 0.8 (H) 0.0 - 0.0 /100 WBCs    RBC 3.45 (L) 4.50 - 5.90 x10*6/uL    Hemoglobin 10.0 (L) 13.5 - 17.5 g/dL    Hematocrit 26.9 (L) 41.0 - 52.0 %    MCV 78 (L) 80 - 100 fL    MCH 29.0 26.0 - 34.0 pg    MCHC 37.2 (H) 32.0 - 36.0 g/dL    RDW 18.0 (H) 11.5 - 14.5 %    Platelets 141 (L) 150 - 450 x10*3/uL   Coagulation Screen   Result Value Ref Range    Protime 15.2 (H) 9.8 - 12.8 seconds    INR 1.3 (H) 0.9 - 1.1    aPTT 33 27 - 38 seconds   Basic Metabolic Panel   Result Value Ref Range    Glucose 68 (L) 74 - 99 mg/dL    Sodium 138 136 - 145 mmol/L    Potassium 5.1 3.5 - 5.3 mmol/L    Chloride 101 98 - 107 mmol/L    Bicarbonate 22 21 - 32 mmol/L    Anion Gap 20 10 - 20 mmol/L    Urea  Nitrogen 99 (HH) 6 - 23 mg/dL    Creatinine 2.88 (H) 0.50 - 1.30 mg/dL    eGFR 24 (L) >60 mL/min/1.73m*2    Calcium 7.7 (L) 8.6 - 10.6 mg/dL   Fibrinogen   Result Value Ref Range    Fibrinogen 174 (L) 200 - 400 mg/dL   TEG Clot Lysis Profile   Result Value Ref Range    R (Reaction Time) K 7.1 4.6 - 9.1 min    LY30 (Lysis) 0.0 0.0 - 2.6 %    MA (Max Amplitude) RT 56.0 52.0 - 70.0 mm    MA ( Ramiro Amplitude) FF 17.0 15.0 - 32.0 mm   POCT GLUCOSE   Result Value Ref Range    POCT Glucose 77 74 - 99 mg/dL   Blood Gas Lactic Acid, Venous   Result Value Ref Range    POCT Lactate, Venous 2.0 0.4 - 2.0 mmol/L   POCT GLUCOSE   Result Value Ref Range    POCT Glucose 53 (L) 74 - 99 mg/dL   POCT GLUCOSE   Result Value Ref Range    POCT Glucose 79 74 - 99 mg/dL   Blood Gas Arterial Full Panel   Result Value Ref Range    POCT pH, Arterial 7.35 (L) 7.38 - 7.42 pH    POCT pCO2, Arterial 39 38 - 42 mm Hg    POCT pO2, Arterial 155 (H) 85 - 95 mm Hg    POCT SO2, Arterial 99 94 - 100 %    POCT Oxy Hemoglobin, Arterial 96.6 94.0 - 98.0 %    POCT Hematocrit Calculated, Arterial 28.0 (L) 41.0 - 52.0 %    POCT Sodium, Arterial 133 (L) 136 - 145 mmol/L    POCT Potassium, Arterial 4.9 3.5 - 5.3 mmol/L    POCT Chloride, Arterial 104 98 - 107 mmol/L    POCT Ionized Calcium, Arterial 1.12 1.10 - 1.33 mmol/L    POCT Glucose, Arterial 65 (L) 74 - 99 mg/dL    POCT Lactate, Arterial 2.0 0.4 - 2.0 mmol/L    POCT Base Excess, Arterial -3.8 (L) -2.0 - 3.0 mmol/L    POCT HCO3 Calculated, Arterial 21.5 (L) 22.0 - 26.0 mmol/L    POCT Hemoglobin, Arterial 9.3 (L) 13.5 - 17.5 g/dL    POCT Anion Gap, Arterial 12 10 - 25 mmo/L    Patient Temperature 37.0 degrees Celsius    FiO2 70 %   CALCIUM, IONIZED   Result Value Ref Range    POCT Calcium, Ionized 1.05 (L) 1.1 - 1.33 mmol/L   CBC and Auto Differential   Result Value Ref Range    WBC 10.6 4.4 - 11.3 x10*3/uL    nRBC 0.6 (H) 0.0 - 0.0 /100 WBCs    RBC 3.16 (L) 4.50 - 5.90 x10*6/uL    Hemoglobin 9.0 (L) 13.5 -  17.5 g/dL    Hematocrit 24.8 (L) 41.0 - 52.0 %    MCV 79 (L) 80 - 100 fL    MCH 28.5 26.0 - 34.0 pg    MCHC 36.3 (H) 32.0 - 36.0 g/dL    RDW 17.7 (H) 11.5 - 14.5 %    Platelets 116 (L) 150 - 450 x10*3/uL    Immature Granulocytes %, Automated 5.5 (H) 0.0 - 0.9 %    Immature Granulocytes Absolute, Automated 0.58 0.00 - 0.70 x10*3/uL   Magnesium   Result Value Ref Range    Magnesium 2.29 1.60 - 2.40 mg/dL   Hepatic function panel   Result Value Ref Range    Albumin 3.2 (L) 3.4 - 5.0 g/dL    Bilirubin, Total 20.9 (H) 0.0 - 1.2 mg/dL    Bilirubin, Direct 15.3 (H) 0.0 - 0.3 mg/dL    Alkaline Phosphatase 109 33 - 136 U/L    ALT 5 (L) 10 - 52 U/L    AST 87 (H) 9 - 39 U/L    Total Protein 4.7 (L) 6.4 - 8.2 g/dL   Basic Metabolic Panel   Result Value Ref Range    Glucose 65 (L) 74 - 99 mg/dL    Sodium 137 136 - 145 mmol/L    Potassium 4.8 3.5 - 5.3 mmol/L    Chloride 101 98 - 107 mmol/L    Bicarbonate 22 21 - 32 mmol/L    Anion Gap 19 10 - 20 mmol/L    Urea Nitrogen 85 (H) 6 - 23 mg/dL    Creatinine 2.53 (H) 0.50 - 1.30 mg/dL    eGFR 28 (L) >60 mL/min/1.73m*2    Calcium 8.0 (L) 8.6 - 10.6 mg/dL   Phosphorus   Result Value Ref Range    Phosphorus 5.7 (H) 2.5 - 4.9 mg/dL   Manual Differential   Result Value Ref Range    Neutrophils %, Manual 86.9 40.0 - 80.0 %    Bands %, Manual 10.7 0.0 - 5.0 %    Lymphocytes %, Manual 0.8 13.0 - 44.0 %    Monocytes %, Manual 0.8 2.0 - 10.0 %    Eosinophils %, Manual 0.0 0.0 - 6.0 %    Basophils %, Manual 0.0 0.0 - 2.0 %    Myelocytes %, Manual 0.8 0.0 - 0.0 %    Seg Neutrophils Absolute, Manual 9.21 (H) 1.20 - 7.00 x10*3/uL    Bands Absolute, Manual 1.13 (H) 0.00 - 0.70 x10*3/uL    Lymphocytes Absolute, Manual 0.08 (L) 1.20 - 4.80 x10*3/uL    Monocytes Absolute, Manual 0.08 (L) 0.10 - 1.00 x10*3/uL    Eosinophils Absolute, Manual 0.00 0.00 - 0.70 x10*3/uL    Basophils Absolute, Manual 0.00 0.00 - 0.10 x10*3/uL    Myelocytes Absolute, Manual 0.08 0.00 - 0.00 x10*3/uL    Total Cells Counted 122      Neutrophils Absolute, Manual 10.34 (H) 1.20 - 7.70 x10*3/uL    RBC Morphology See Below     Polychromasia Mild     Hypochromia Mild     Target Cells Few     Ovalocytes Few     [place] Cells Few     Acanthocytes Few    POCT GLUCOSE   Result Value Ref Range    POCT Glucose 64 (L) 74 - 99 mg/dL   POCT GLUCOSE   Result Value Ref Range    POCT Glucose 91 74 - 99 mg/dL   POCT GLUCOSE   Result Value Ref Range    POCT Glucose 81 74 - 99 mg/dL   Blood Gas Arterial Full Panel   Result Value Ref Range    POCT pH, Arterial 7.38 7.38 - 7.42 pH    POCT pCO2, Arterial 37 (L) 38 - 42 mm Hg    POCT pO2, Arterial 107 (H) 85 - 95 mm Hg    POCT SO2, Arterial 100 94 - 100 %    POCT Oxy Hemoglobin, Arterial 96.5 94.0 - 98.0 %    POCT Hematocrit Calculated, Arterial 26.0 (L) 41.0 - 52.0 %    POCT Sodium, Arterial 134 (L) 136 - 145 mmol/L    POCT Potassium, Arterial 4.7 3.5 - 5.3 mmol/L    POCT Chloride, Arterial 104 98 - 107 mmol/L    POCT Ionized Calcium, Arterial 1.14 1.10 - 1.33 mmol/L    POCT Glucose, Arterial 81 74 - 99 mg/dL    POCT Lactate, Arterial 1.8 0.4 - 2.0 mmol/L    POCT Base Excess, Arterial -2.9 (L) -2.0 - 3.0 mmol/L    POCT HCO3 Calculated, Arterial 21.9 (L) 22.0 - 26.0 mmol/L    POCT Hemoglobin, Arterial 8.6 (L) 13.5 - 17.5 g/dL    POCT Anion Gap, Arterial 13 10 - 25 mmo/L    Patient Temperature 37.0 degrees Celsius    FiO2 50 %   POCT GLUCOSE   Result Value Ref Range    POCT Glucose 85 74 - 99 mg/dL                    Assessment/Plan   Principal Problem:    Soft tissue sarcoma (Multi)  Active Problems:    Acute kidney injury (nontraumatic) (CMS-HCC)    Pulmonary embolus (Multi)    Anemia    Thrombocytopenia (CMS-HCC)    Current chronic use of systemic steroids    Gastroesophageal reflux disease without esophagitis    Extraskeletal myxoid chondrosarcoma (Multi)    Cardiopulmonary arrest (Multi)    Acute respiratory failure with hypoxia (Multi)    Tracheostomy hemorrhage (Multi)    Postoperative wound breakdown, initial  encounter    Postoperative wound breakdown, sequela    Hematoma    Jason Hollins is a 63 y.o. 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 Hawkins and Dr. Smith on 3/5/24.  Post operative course was uncomplicated and patient was on RNF until 3/20 for prolonged bed rest to avoid hip flexion to maintain flap integrity.  Patient was on prophylactic lovenox while on bedrest.      3/20: Cardiopulmonary arrest s/p CPR with ROSC after TPA, overnight started on heparin, epo, increased pressor requirements, increased swelling at flap site.      3/21: Hemorrhagic shock, MTP. Firm and large right flap site. Return to OR s/p Exploration of right thigh wound, Evacuation of hematoma. Suture ligation of multiple bleeding vessel and several points in gluteal area.      4/1: s/p Trach     4/4: return OR with ENT s/p laryngoscopy, esophagoscopy and bronchoscopy, trach revision. Found extensive clot burden in esophagus and stomach as well as in the lungs. Oozing at thyroid bed cauterized. Trach exchanged to new 6.0 prox XLT elvie. OR findings:  blood up glottis and from thyroid bed to airway.     4/9: Patient is medically stable for OR on 4/11/24.  He is appropriately n.p.o. since midnight and has had more DVT prophylaxis held for OR today.     4/18: Patient bleeding into RLE given increased ANDERSON drain output and non-incrementing Hgb despite transfusion. Return to OR for washout.        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 Hawikns and Dr. Smith on 3/5/24 s/p cardiopulmonary arrest with ROSC 3/20. CT head 3/22 without acute process. Weaned  off cisatracurium and propofol overnight 3/23-3/24. Ketamine weaned off 3/25 and Fentanyl weaned off 3/26 am. Repeat CT Head 3/26 without acute process. MRI Brain 3/30, negative for cerebral infarction or hemorrhage. Neuro exam - following commands except for RLE, tracking, nodding/shaking head to questions  - PRN Oxycodone and Tylenol continued  -Dilaudid PRN   - ongoing neuro and pain assessments - will continue to assess for post op pain  - PT/OT -> AROM     CV: History of HTN, HLD. Acute cardiopulmonary arrest 2/2 to possible massive PE vs massive STEMI, received multiple rounds of CPR and one defibrillation.  Sustained ROSC achieved after TPA bolus. On high dose levophed, epinephrine, vaso, angioT2. Plan on 3/21 was for ECMO which was aborted secondary to large hemhorrge at right flap site. Had MTP and taken OR with 5L evacuated. ECHO 3/21: Normal LV, Mod enlarged RV, slight suggestion of a Townsend's sign / RV strain, low normal RV function. ECHO 3/22 with hyperdynamic LV. New onset Afib with RVR overnight 3/22-3/23, dosed with 150mg amio x2, started infusion at 1mg/min thereafter. AT2 weaned off. Amio infusion discontinued 3/25. Lactate downtrending. Vaso and epi weaned off. Remains in NSR. iEpo weaned off 3/27. Repeat TTE 3/29 and 4/2 essentially unchanged. Levo resumed 4/2 evening to continue aggressive fluid removal. Repeat TTE on 4/10 with LVEF 65-70% and RV difficulty to assess. Patient HDS without pressors on 4/18. Blood transfusion ongoing for resuscitation.  - Ordered 25% Albumin 25 g q8hrs today   - continuous EKG/abp/cvp monitoring  - Goal map range 65-90  - Continue midodrine dose to 10 mg 3 times daily via [place]  - Continue florinef 0.1mg BID   - Continue stress dose steroids to 50 mg q 8h   - Off Levo since 4/13     PULM: Arrived to SICU intubated julio c-CPR. Concern for massive PE. Started on iEpo, currently on 0.05. Hypoxic respiratory failure. Severe ARDS. ETT exchanged 3/23. CT Chest 3/26  with interval JOHN consolidative/ground glass opacity. S/p Tracheostomy on 4/1. Bedside bronch -> some blood in trach, posterior wall tissue just distal to trach tip appears to be collapsing on cannula.   - On SIMV this morning - FiO2 50%, PEEP 10, Psupp 12, RR 16, Vt 500  - ABGs q3hr today given oxygenation concern in OR - will follow up  - Oxygenation in OR likely affected given positioning with L lung dependent position (R lung with worse edema/effusion)  - additional pulm toilet prn -will discuss regimen with RT  - daily CXR -chest x-ray today indicates interval increase in right-greater-than-left pulmonary  opacities. Noticeable R sided pleural effusion worse than L side     ENT: Had epistaxis 3/23, completed afrin bid x3 days. S/p trach on 4/1. Bleeding from trach site 4/2 am, packing placed by ENT. Repeat episode of bloody tracheal secretions 4/3 through this am. Taken back to OR with ENT 4/4 as per above.  -Had Slight bleeding from trach site noted per bedside nurse, however he was evaluated by ENT and they mentioned there was no evidence of bleeding in the patient's oropharynx and its most likely because of traumatic suctioning.   - Today: no bleeding noted around trach site  - Left internal jugular trialysis line site without bleeding        GI: NPO. Shock liver, pancreatitis. Elevated TG. Elevated lactate. Consulted ACS for potential bowel ischemia as cause of persistent lactic acidosis. CT imaging 3/22 with evidence of pancreatitis. No acute surgical indication per ACS. RUQ US 3/22 with thickening of GB wall without cholelithiasis. CT A/P 3/26 negative for acute bleed. LFTs downtrending. Worsening hyperbilirubinemia. Amylase and lipase now WNL. Repeat RUQ US 4/1 with nonspecific gallbladder wall edema and thickening which may be 2/2 generalized fluid overload, mild hepatomegaly with slight nodular contour and c/f steatosis and fibrosis, irregular hypoechoic region adjacent to hepatic veins. US liver with  doppler 4/2 revealed hepatomegaly with nodular contour, mildly elevated RI in main and left hepatic arteries. Hyperbilirubinemia  - Tube feeds restarted at 15cc/hr. Per nutrition recs will increase by 10cc/hr q12 hours until goal 45cc/hr  - PPI BID given potential GI bleeding (elevated BUN) - consider de-escalating to PPI daily tomorrow (4/20) if no concerns for GI bleed  - Trend LFTs daily -AST/ALT/total bilirubin is similar to prior days.  Per discussion with hepatology, minor fluctuations are to be expected prior to eventual downtrend     : No history of renal disease. Baseline creatinine 0.6. Anuric RUTH. Elevated CK, downtrending. Metabolic acidosis, total body fluid overload, hyperkalemia. Started on CVVH 3/21, stopped bicarb infusion 3/22. Marino removed 3/24. Hyponatremia resolved. Stopped CVVH 4/2.  Has been tolerating iHD. Net +ve 1467. CVVH restarted on 4/18 given hemorrhage and takeback to OR.   - Continue CVVH - targeting net even today with monitoring of hemodynamics. Consider fluid removal in near future if hemodynamically stable and can tolerate  - Nephrology following   - RFP BID and PRN  - Replete electrolytes judiciously     HEME: Patient was on ppx lovenox for DVT prophylaxis prior to cardiopulmonary arrest. Concern for massive PE patient received bolus of TPA during CPR. Started on heparin infusion overnight given concern for PE. Hemorrhagic shock 3/21, MTP and back to OR s/p Exploration of right thigh woundEvacuation of hematoma. Suture ligation of multiple bleeding vessel and several points in gluteal area. Hematology consulted 3/22 to r/o HLH. Transfused 1 RBC overnight 3/22-3/23. Thrombocytopenia, coagulapathy, hypofibrinogenemia. LE Duplex 3/25 +acute occlusive R soleal DVT. Received 2u PRBC and 1u FFP on 3/26. Heparin infusion discontinued 3/26 to r/o HIT and transitioned to bival. PF4 negative, resumed heparin infuision 3/27. Elevated IL2R and decreased NK function. C/f HLH (low  suspicion), empirically treated with decadron (3/28-3/31). Thrombocytopenia on 3/30 to 18k, received 5pk, did not increment. Heparin held. Thrombocytopenia likely 2/2 to consumptive process, hematology following. Received IVIG and 5pk plts 3/31. Pancytopenia persists, improving thrombocytopenia. On 4/18 increased bleeding noted into RLE. Blood transfusion ongoing and Hep gtt held  - CBC q6hr today- will follow up Hgb level  - No anticoagulation  - Contacting Vascular Medicine to update and for consideration of IVC filter  - Notify plastics if ANDERSON drain output exceeds > 300 ml/hr (continuing to monitor closely)   - close surveillance of RLE and drains  - maintain active T&S (4/18)     ENDO: History of DM2. A1c 7.5%. TSH WNL 1/2024. Hyperglycemia  - Hypoglycemic overnight and this morning (given 1/2 amp D50 x2). Lantus and SSI held.   - Obtain q1 accuchecks - will monitor today for need for SSI type/potential long acting. Anticipate increase in blood glucose as tube feeds continue to escalate  - q4h accuchecks and SSI #4     ID: Leukocytosis resolved, now with leukopenia. On broad antimicrobial therapy. MRSA screen + 2/28/24. Pan cultures sent 3/22. Sputum NTF, +MRSA screen (completed 5 day course mupirocin), UCx and BCx negative. Completed 7 day course vanc/zosyn 3/27. Febrile to 39.1 4/3, resent blood cultures and BAL and started vanco and zosyn. Switched zosyn to reynaldo, kept on vanco, added john. Blood cultures and sputum with Klebsiella. Repeat blood cultures sent 4/4.  - F/U cultures - Klebsiella grown in wound culture - susceptible to Meropenem per ID  - temp q4h, wbc daily  - Tissue cultures growing Klebsiella  - Per ID - continue Meropenem 1g daily with end date 4/24  - If continues to be hypotensive consider broaden abx/ consider adding antifungal     Lines:  - L internal jugular trialysis (4/18)  - right radial a line (4/5)  - PIV x3  - Pending IR for tunneled line - will re-engage when patient clinically  stable     Patient seen and discussed with Dr. Ness.    Mariluz Dailey, PGY-1  SICU Team

## 2024-04-19 NOTE — PROGRESS NOTES
ENT DAILY PROGRESS NOTE  Name: Jason Hollins  MRN: [id number]  : 1960    Identification Statement  The patient is a 63 y.o. male with past medical history significant for but not limited to DM2, HLD, myxoid chondrosarcoma s/p wide resection sarcoma, sciatic nerve neurolysis of RLE with right gluteal reconstruction, pedicle ALT, vas lateralus flap, pedicle gluteal and perisformis flap on 3/5/24 with plastic surgery. Patient's hospital course complicated by cardiopulmonary arrest s/p CPR with ROSC after TPA, hemorrhagic shock. Patient initially underwent tracheostomy and bronchoscopy with Dr. Perkins on 24 for acute respiratory failure with hypoxia; he experienced post-operative bleeding from trach site and returned to OR with ENT (Dr. Wolfe) on 24 for DL, bronchoscopy, esophagoscopy and exploration of neck/trach site with source of bleeding appearing to be trickling from thyroid bed down into the airway and up to the glottis. Original trach was exchanged and a 6-0 shiley proximal XLT was placed. ENT evaluated patient on 24 due to concern for bleeding from within trach at which time patient underwent tracheobronchoscopy without evidence of any dried blood or clots. Per nursing, there have not been any further episodes of bleeding from trach. He remains on ventilatory support with FiO2 50% and PEEP 10. Trach cuff up.        Subjective  Patient seen and examined at bedside this am. Nursing staff denies any further bleeding episodes from trach since last evaluated by ENT on 24. He is s/p I&D of right hip with evacuation of hematoma, closure and incisional wound vac replacement on 24 with plastic surgery. Velcro ties appropriately tightened. He remains critically ill in the ICU.     Objective  Temp:  [35.6 °C (96.1 °F)-37.4 °C (99.3 °F)] 36.5 °C (97.7 °F)  Heart Rate:  [60-93] 60  Resp:  [0-26] 18  BP: (100-155)/(45-79) 102/58  Arterial Line BP 1: ()/(40-57) 105/50  FiO2 (%):  [50 %-100 %] 50  %  Gen: remains critically ill in the ICU  Resp: ventilator with FiO2 50% and PEEP 10.   Head: Atraumatic, normocephalic  Ears: Normal external ears   Nose: External nose midline   Neck: 6-0 shiley XLT shiley tracheostomy tube (cuff up), velcro ties appropriately tightened. No bleeding or oozing of blood from around trach stoma. Scant amount of secretions inferior part of trach stoma. No evidence of skin breakdown or erythema surrounding trach stoma.   Skin: significant full body jaundice  MSK: bilateral lower extremity and upper extremity edema        Intake/Output Summary (Last 24 hours) at 4/19/2024 1018  Last data filed at 4/19/2024 1000  Gross per 24 hour   Intake 3861 ml   Output 1603 ml   Net 2258 ml       Labs  Results for orders placed or performed during the hospital encounter of 03/05/24 (from the past 24 hour(s))   CBC   Result Value Ref Range    WBC 6.9 4.4 - 11.3 x10*3/uL    nRBC 0.6 (H) 0.0 - 0.0 /100 WBCs    RBC 2.54 (L) 4.50 - 5.90 x10*6/uL    Hemoglobin 7.1 (L) 13.5 - 17.5 g/dL    Hematocrit 20.7 (L) 41.0 - 52.0 %    MCV 82 80 - 100 fL    MCH 28.0 26.0 - 34.0 pg    MCHC 34.3 32.0 - 36.0 g/dL    RDW 20.8 (H) 11.5 - 14.5 %    Platelets 130 (L) 150 - 450 x10*3/uL   POCT GLUCOSE   Result Value Ref Range    POCT Glucose 132 (H) 74 - 99 mg/dL   CBC   Result Value Ref Range    WBC 7.0 4.4 - 11.3 x10*3/uL    nRBC 0.4 (H) 0.0 - 0.0 /100 WBCs    RBC 2.56 (L) 4.50 - 5.90 x10*6/uL    Hemoglobin 7.2 (L) 13.5 - 17.5 g/dL    Hematocrit 19.8 (L) 41.0 - 52.0 %    MCV 77 (L) 80 - 100 fL    MCH 28.1 26.0 - 34.0 pg    MCHC 36.4 (H) 32.0 - 36.0 g/dL    RDW 19.4 (H) 11.5 - 14.5 %    Platelets 114 (L) 150 - 450 x10*3/uL   Coagulation Screen   Result Value Ref Range    Protime 16.4 (H) 9.8 - 12.8 seconds    INR 1.5 (H) 0.9 - 1.1    aPTT 33 27 - 38 seconds   Fibrinogen   Result Value Ref Range    Fibrinogen 169 (L) 200 - 400 mg/dL   Prepare Plasma: 2 Units   Result Value Ref Range    PRODUCT CODE C3922W80     Unit Number  [id number]     Unit ABO O     Unit RH POS     Dispense Status RE     Blood Expiration Date April 22, 2024 09:07 EDT     PRODUCT BLOOD TYPE 5100     UNIT VOLUME 327     PRODUCT CODE Q6271G25     Unit Number [id number]     Unit ABO A     Unit RH POS     Dispense Status TR     Blood Expiration Date April 22, 2024 08:45 EDT     PRODUCT BLOOD TYPE 6200     UNIT VOLUME 209    Prepare RBC: 2 Units   Result Value Ref Range    PRODUCT CODE Z3562R55     Unit Number [id number]     Unit ABO O     Unit RH POS     XM INTEP COMP     Dispense Status TR     Blood Expiration Date May 09, 2024 23:59 EDT     PRODUCT BLOOD TYPE 5100     UNIT VOLUME 350     PRODUCT CODE Z2463E90     Unit Number [id number]     Unit ABO O     Unit RH POS     XM INTEP COMP     Dispense Status XM     Blood Expiration Date May 08, 2024 23:59 EDT     PRODUCT BLOOD TYPE 5100     UNIT VOLUME 350    CBC   Result Value Ref Range    WBC 5.7 4.4 - 11.3 x10*3/uL    nRBC 1.1 (H) 0.0 - 0.0 /100 WBCs    RBC 2.90 (L) 4.50 - 5.90 x10*6/uL    Hemoglobin 8.4 (L) 13.5 - 17.5 g/dL    Hematocrit 22.7 (L) 41.0 - 52.0 %    MCV 78 (L) 80 - 100 fL    MCH 29.0 26.0 - 34.0 pg    MCHC 37.0 (H) 32.0 - 36.0 g/dL    RDW 17.9 (H) 11.5 - 14.5 %    Platelets 122 (L) 150 - 450 x10*3/uL   Prepare RBC: 2 Units   Result Value Ref Range    PRODUCT CODE Z8098D18     Unit Number [id number]     Unit ABO O     Unit RH POS     XM INTEP COMP     Dispense Status XM     Blood Expiration Date May 07, 2024 23:59 EDT     PRODUCT BLOOD TYPE 5100     UNIT VOLUME 350     PRODUCT CODE B9356A38     Unit Number [id number]     Unit ABO O     Unit RH POS     XM INTEP COMP     Dispense Status XM     Blood Expiration Date May 08, 2024 23:59 EDT     PRODUCT BLOOD TYPE 5100     UNIT VOLUME 350    Prepare Plasma: 2 Units   Result Value Ref Range    PRODUCT CODE U8128E34     Unit Number [id number]     Unit ABO B     Unit RH POS     Dispense Status RE     Blood Expiration Date April  19, 2024 10:00 EDT     PRODUCT BLOOD TYPE 7300     UNIT VOLUME 290     PRODUCT CODE B5304I89     Unit Number [id number]     Unit ABO B     Unit RH POS     Dispense Status RE     Blood Expiration Date April 19, 2024 10:36 EDT     PRODUCT BLOOD TYPE 7300     UNIT VOLUME 291    POCT GLUCOSE   Result Value Ref Range    POCT Glucose 116 (H) 74 - 99 mg/dL   Prepare RBC: 3 Units   Result Value Ref Range    PRODUCT CODE R4640T81     Unit Number [id number]     Unit ABO O     Unit RH POS     XM INTEP COMP     Dispense Status XM     Blood Expiration Date May 05, 2024 23:59 EDT     PRODUCT BLOOD TYPE 5100     UNIT VOLUME 324    Prepare Platelets: 2 Units   Result Value Ref Range    PRODUCT CODE M1266K60     Unit Number [id number]     Unit ABO O     Unit RH POS     Dispense Status RE     Blood Expiration Date April 18, 2024 23:59 EDT     PRODUCT BLOOD TYPE 5100     UNIT VOLUME 275     PRODUCT CODE B3541I03     Unit Number [id number]     Unit ABO A     Unit RH POS     Dispense Status RE     Blood Expiration Date April 19, 2024 23:59 EDT     PRODUCT BLOOD TYPE 6200     UNIT VOLUME 218    Prepare Cryoprecipitated AHF (Pooled Units): 2 Pools   Result Value Ref Range    PRODUCT CODE K6774A93     Unit Number [id number]     Unit ABO O     Unit RH POS     Dispense Status TR     Blood Expiration Date April 18, 2024 23:00 EDT     PRODUCT BLOOD TYPE 5100     UNIT VOLUME 84    Blood Gas Arterial Full Panel   Result Value Ref Range    POCT pH, Arterial 7.47 (H) 7.38 - 7.42 pH    POCT pCO2, Arterial 30 (L) 38 - 42 mm Hg    POCT pO2, Arterial 74 (L) 85 - 95 mm Hg    POCT SO2, Arterial 97 94 - 100 %    POCT Oxy Hemoglobin, Arterial 93.9 (L) 94.0 - 98.0 %    POCT Hematocrit Calculated, Arterial 28.0 (L) 41.0 - 52.0 %    POCT Sodium, Arterial 133 (L) 136 - 145 mmol/L    POCT Potassium, Arterial 5.2 3.5 - 5.3 mmol/L    POCT Chloride, Arterial 101 98 - 107 mmol/L    POCT Ionized Calcium, Arterial 1.13 1.10 - 1.33 mmol/L     POCT Glucose, Arterial 96 74 - 99 mg/dL    POCT Lactate, Arterial 2.4 (H) 0.4 - 2.0 mmol/L    POCT Base Excess, Arterial -1.3 -2.0 - 3.0 mmol/L    POCT HCO3 Calculated, Arterial 21.8 (L) 22.0 - 26.0 mmol/L    POCT Hemoglobin, Arterial 9.2 (L) 13.5 - 17.5 g/dL    POCT Anion Gap, Arterial 15 10 - 25 mmo/L    Patient Temperature 37.0 degrees Celsius    FiO2 50 %   Blood Gas Arterial   Result Value Ref Range    POCT pH, Arterial 7.47 (H) 7.38 - 7.42 pH    POCT pCO2, Arterial 30 (L) 38 - 42 mm Hg    POCT pO2, Arterial 74 (L) 85 - 95 mm Hg    POCT SO2, Arterial 97 94 - 100 %    POCT Oxy Hemoglobin, Arterial 93.9 (L) 94.0 - 98.0 %    POCT Base Excess, Arterial -1.3 -2.0 - 3.0 mmol/L    POCT HCO3 Calculated, Arterial 21.8 (L) 22.0 - 26.0 mmol/L    Patient Temperature 37.0 degrees Celsius    FiO2 50 %   Coagulation Screen   Result Value Ref Range    Protime 15.1 (H) 9.8 - 12.8 seconds    INR 1.3 (H) 0.9 - 1.1    aPTT 32 27 - 38 seconds   Blood Gas Arterial Full Panel   Result Value Ref Range    POCT pH, Arterial 7.47 (H) 7.38 - 7.42 pH    POCT pCO2, Arterial 29 (L) 38 - 42 mm Hg    POCT pO2, Arterial 73 (L) 85 - 95 mm Hg    POCT SO2, Arterial 97 94 - 100 %    POCT Oxy Hemoglobin, Arterial 93.9 (L) 94.0 - 98.0 %    POCT Hematocrit Calculated, Arterial 29.0 (L) 41.0 - 52.0 %    POCT Sodium, Arterial 134 (L) 136 - 145 mmol/L    POCT Potassium, Arterial 5.0 3.5 - 5.3 mmol/L    POCT Chloride, Arterial 101 98 - 107 mmol/L    POCT Ionized Calcium, Arterial 1.13 1.10 - 1.33 mmol/L    POCT Glucose, Arterial 94 74 - 99 mg/dL    POCT Lactate, Arterial 2.2 (H) 0.4 - 2.0 mmol/L    POCT Base Excess, Arterial -1.9 -2.0 - 3.0 mmol/L    POCT HCO3 Calculated, Arterial 21.1 (L) 22.0 - 26.0 mmol/L    POCT Hemoglobin, Arterial 9.8 (L) 13.5 - 17.5 g/dL    POCT Anion Gap, Arterial 17 10 - 25 mmo/L    Patient Temperature 37.0 degrees Celsius    FiO2 50 %   CBC   Result Value Ref Range    WBC 7.9 4.4 - 11.3 x10*3/uL    nRBC 0.6 (H) 0.0 - 0.0 /100  WBCs    RBC 3.02 (L) 4.50 - 5.90 x10*6/uL    Hemoglobin 8.8 (L) 13.5 - 17.5 g/dL    Hematocrit 23.3 (L) 41.0 - 52.0 %    MCV 77 (L) 80 - 100 fL    MCH 29.1 26.0 - 34.0 pg    MCHC 37.8 (H) 32.0 - 36.0 g/dL    RDW 17.9 (H) 11.5 - 14.5 %    Platelets 120 (L) 150 - 450 x10*3/uL   PST Top   Result Value Ref Range    Extra Tube Hold for add-ons.    Blood Gas Arterial Full Panel   Result Value Ref Range    POCT pH, Arterial 7.26 (L) 7.38 - 7.42 pH    POCT pCO2, Arterial 52 (H) 38 - 42 mm Hg    POCT pO2, Arterial 61 (L) 85 - 95 mm Hg    POCT SO2, Arterial 90 (L) 94 - 100 %    POCT Oxy Hemoglobin, Arterial 87.4 (L) 94.0 - 98.0 %    POCT Hematocrit Calculated, Arterial 29.0 (L) 41.0 - 52.0 %    POCT Sodium, Arterial 133 (L) 136 - 145 mmol/L    POCT Potassium, Arterial 4.9 3.5 - 5.3 mmol/L    POCT Chloride, Arterial 102 98 - 107 mmol/L    POCT Ionized Calcium, Arterial 1.13 1.10 - 1.33 mmol/L    POCT Glucose, Arterial 94 74 - 99 mg/dL    POCT Lactate, Arterial 2.0 0.4 - 2.0 mmol/L    POCT Base Excess, Arterial -3.9 (L) -2.0 - 3.0 mmol/L    POCT HCO3 Calculated, Arterial 23.3 22.0 - 26.0 mmol/L    POCT Hemoglobin, Arterial 9.5 (L) 13.5 - 17.5 g/dL    POCT Anion Gap, Arterial 13 10 - 25 mmo/L    Patient Temperature 37.0 degrees Celsius    FiO2 100 %   Blood Gas Arterial Full Panel   Result Value Ref Range    POCT pH, Arterial 7.24 (LL) 7.38 - 7.42 pH    POCT pCO2, Arterial 51 (H) 38 - 42 mm Hg    POCT pO2, Arterial 91 85 - 95 mm Hg    POCT SO2, Arterial 98 94 - 100 %    POCT Oxy Hemoglobin, Arterial 94.3 94.0 - 98.0 %    POCT Hematocrit Calculated, Arterial 31.0 (L) 41.0 - 52.0 %    POCT Sodium, Arterial 133 (L) 136 - 145 mmol/L    POCT Potassium, Arterial 5.3 3.5 - 5.3 mmol/L    POCT Chloride, Arterial 103 98 - 107 mmol/L    POCT Ionized Calcium, Arterial 1.12 1.10 - 1.33 mmol/L    POCT Glucose, Arterial 76 74 - 99 mg/dL    POCT Lactate, Arterial 2.1 (H) 0.4 - 2.0 mmol/L    POCT Base Excess, Arterial -5.6 (L) -2.0 - 3.0 mmol/L     POCT HCO3 Calculated, Arterial 21.9 (L) 22.0 - 26.0 mmol/L    POCT Hemoglobin, Arterial 10.4 (L) 13.5 - 17.5 g/dL    POCT Anion Gap, Arterial 13 10 - 25 mmo/L    Patient Temperature 37.0 degrees Celsius    FiO2 50 %   Magnesium   Result Value Ref Range    Magnesium 2.24 1.60 - 2.40 mg/dL   Hepatic function panel   Result Value Ref Range    Albumin 3.2 (L) 3.4 - 5.0 g/dL    Bilirubin, Total 20.3 (H) 0.0 - 1.2 mg/dL    Bilirubin, Direct 21.3 (H) 0.0 - 0.3 mg/dL    Alkaline Phosphatase 128 33 - 136 U/L    ALT 5 (L) 10 - 52 U/L    AST 91 (H) 9 - 39 U/L    Total Protein 4.6 (L) 6.4 - 8.2 g/dL   BUN   Result Value Ref Range    Urea Nitrogen 99 (HH) 6 - 23 mg/dL   Creatinine, Serum   Result Value Ref Range    Creatinine 2.88 (H) 0.50 - 1.30 mg/dL    eGFR 24 (L) >60 mL/min/1.73m*2   Electrolyte panel   Result Value Ref Range    Sodium 138 136 - 145 mmol/L    Potassium 5.1 3.5 - 5.3 mmol/L    Chloride 101 98 - 107 mmol/L    Bicarbonate 22 21 - 32 mmol/L    Anion Gap 20 10 - 20 mmol/L   Calcium, ionized   Result Value Ref Range    POCT Calcium, Ionized 1.05 (L) 1.1 - 1.33 mmol/L   Phosphorus   Result Value Ref Range    Phosphorus 7.2 (H) 2.5 - 4.9 mg/dL   CBC   Result Value Ref Range    WBC 10.9 4.4 - 11.3 x10*3/uL    nRBC 0.8 (H) 0.0 - 0.0 /100 WBCs    RBC 3.45 (L) 4.50 - 5.90 x10*6/uL    Hemoglobin 10.0 (L) 13.5 - 17.5 g/dL    Hematocrit 26.9 (L) 41.0 - 52.0 %    MCV 78 (L) 80 - 100 fL    MCH 29.0 26.0 - 34.0 pg    MCHC 37.2 (H) 32.0 - 36.0 g/dL    RDW 18.0 (H) 11.5 - 14.5 %    Platelets 141 (L) 150 - 450 x10*3/uL   Coagulation Screen   Result Value Ref Range    Protime 15.2 (H) 9.8 - 12.8 seconds    INR 1.3 (H) 0.9 - 1.1    aPTT 33 27 - 38 seconds   Basic Metabolic Panel   Result Value Ref Range    Glucose 68 (L) 74 - 99 mg/dL    Sodium 138 136 - 145 mmol/L    Potassium 5.1 3.5 - 5.3 mmol/L    Chloride 101 98 - 107 mmol/L    Bicarbonate 22 21 - 32 mmol/L    Anion Gap 20 10 - 20 mmol/L    Urea Nitrogen 99 (HH) 6 - 23  mg/dL    Creatinine 2.88 (H) 0.50 - 1.30 mg/dL    eGFR 24 (L) >60 mL/min/1.73m*2    Calcium 7.7 (L) 8.6 - 10.6 mg/dL   Fibrinogen   Result Value Ref Range    Fibrinogen 174 (L) 200 - 400 mg/dL   TEG Clot Lysis Profile   Result Value Ref Range    R (Reaction Time) K 7.1 4.6 - 9.1 min    LY30 (Lysis) 0.0 0.0 - 2.6 %    MA (Max Amplitude) RT 56.0 52.0 - 70.0 mm    MA ( [place] Amplitude) FF 17.0 15.0 - 32.0 mm   POCT GLUCOSE   Result Value Ref Range    POCT Glucose 77 74 - 99 mg/dL   Blood Gas Lactic Acid, Venous   Result Value Ref Range    POCT Lactate, Venous 2.0 0.4 - 2.0 mmol/L   POCT GLUCOSE   Result Value Ref Range    POCT Glucose 53 (L) 74 - 99 mg/dL   POCT GLUCOSE   Result Value Ref Range    POCT Glucose 79 74 - 99 mg/dL   Blood Gas Arterial Full Panel   Result Value Ref Range    POCT pH, Arterial 7.35 (L) 7.38 - 7.42 pH    POCT pCO2, Arterial 39 38 - 42 mm Hg    POCT pO2, Arterial 155 (H) 85 - 95 mm Hg    POCT SO2, Arterial 99 94 - 100 %    POCT Oxy Hemoglobin, Arterial 96.6 94.0 - 98.0 %    POCT Hematocrit Calculated, Arterial 28.0 (L) 41.0 - 52.0 %    POCT Sodium, Arterial 133 (L) 136 - 145 mmol/L    POCT Potassium, Arterial 4.9 3.5 - 5.3 mmol/L    POCT Chloride, Arterial 104 98 - 107 mmol/L    POCT Ionized Calcium, Arterial 1.12 1.10 - 1.33 mmol/L    POCT Glucose, Arterial 65 (L) 74 - 99 mg/dL    POCT Lactate, Arterial 2.0 0.4 - 2.0 mmol/L    POCT Base Excess, Arterial -3.8 (L) -2.0 - 3.0 mmol/L    POCT HCO3 Calculated, Arterial 21.5 (L) 22.0 - 26.0 mmol/L    POCT Hemoglobin, Arterial 9.3 (L) 13.5 - 17.5 g/dL    POCT Anion Gap, Arterial 12 10 - 25 mmo/L    Patient Temperature 37.0 degrees Celsius    FiO2 70 %   CALCIUM, IONIZED   Result Value Ref Range    POCT Calcium, Ionized 1.05 (L) 1.1 - 1.33 mmol/L   CBC and Auto Differential   Result Value Ref Range    WBC 10.6 4.4 - 11.3 x10*3/uL    nRBC 0.6 (H) 0.0 - 0.0 /100 WBCs    RBC 3.16 (L) 4.50 - 5.90 x10*6/uL    Hemoglobin 9.0 (L) 13.5 - 17.5 g/dL    Hematocrit  24.8 (L) 41.0 - 52.0 %    MCV 79 (L) 80 - 100 fL    MCH 28.5 26.0 - 34.0 pg    MCHC 36.3 (H) 32.0 - 36.0 g/dL    RDW 17.7 (H) 11.5 - 14.5 %    Platelets 116 (L) 150 - 450 x10*3/uL    Immature Granulocytes %, Automated 5.5 (H) 0.0 - 0.9 %    Immature Granulocytes Absolute, Automated 0.58 0.00 - 0.70 x10*3/uL   Magnesium   Result Value Ref Range    Magnesium 2.29 1.60 - 2.40 mg/dL   Hepatic function panel   Result Value Ref Range    Albumin 3.2 (L) 3.4 - 5.0 g/dL    Bilirubin, Total 20.9 (H) 0.0 - 1.2 mg/dL    Bilirubin, Direct      Alkaline Phosphatase 109 33 - 136 U/L    ALT 5 (L) 10 - 52 U/L    AST 87 (H) 9 - 39 U/L    Total Protein 4.7 (L) 6.4 - 8.2 g/dL   Basic Metabolic Panel   Result Value Ref Range    Glucose 65 (L) 74 - 99 mg/dL    Sodium 137 136 - 145 mmol/L    Potassium 4.8 3.5 - 5.3 mmol/L    Chloride 101 98 - 107 mmol/L    Bicarbonate 22 21 - 32 mmol/L    Anion Gap 19 10 - 20 mmol/L    Urea Nitrogen 85 (H) 6 - 23 mg/dL    Creatinine 2.53 (H) 0.50 - 1.30 mg/dL    eGFR 28 (L) >60 mL/min/1.73m*2    Calcium 8.0 (L) 8.6 - 10.6 mg/dL   Phosphorus   Result Value Ref Range    Phosphorus 5.7 (H) 2.5 - 4.9 mg/dL   Manual Differential   Result Value Ref Range    Neutrophils %, Manual 86.9 40.0 - 80.0 %    Bands %, Manual 10.7 0.0 - 5.0 %    Lymphocytes %, Manual 0.8 13.0 - 44.0 %    Monocytes %, Manual 0.8 2.0 - 10.0 %    Eosinophils %, Manual 0.0 0.0 - 6.0 %    Basophils %, Manual 0.0 0.0 - 2.0 %    Myelocytes %, Manual 0.8 0.0 - 0.0 %    Seg Neutrophils Absolute, Manual 9.21 (H) 1.20 - 7.00 x10*3/uL    Bands Absolute, Manual 1.13 (H) 0.00 - 0.70 x10*3/uL    Lymphocytes Absolute, Manual 0.08 (L) 1.20 - 4.80 x10*3/uL    Monocytes Absolute, Manual 0.08 (L) 0.10 - 1.00 x10*3/uL    Eosinophils Absolute, Manual 0.00 0.00 - 0.70 x10*3/uL    Basophils Absolute, Manual 0.00 0.00 - 0.10 x10*3/uL    Myelocytes Absolute, Manual 0.08 0.00 - 0.00 x10*3/uL    Total Cells Counted 122     Neutrophils Absolute, Manual 10.34 (H)  1.20 - 7.70 x10*3/uL    RBC Morphology See Below     Polychromasia Mild     Hypochromia Mild     Target Cells Few     Ovalocytes Few     [place] Cells Few     Acanthocytes Few    POCT GLUCOSE   Result Value Ref Range    POCT Glucose 64 (L) 74 - 99 mg/dL   POCT GLUCOSE   Result Value Ref Range    POCT Glucose 91 74 - 99 mg/dL   POCT GLUCOSE   Result Value Ref Range    POCT Glucose 81 74 - 99 mg/dL   Blood Gas Arterial Full Panel   Result Value Ref Range    POCT pH, Arterial 7.38 7.38 - 7.42 pH    POCT pCO2, Arterial 37 (L) 38 - 42 mm Hg    POCT pO2, Arterial 107 (H) 85 - 95 mm Hg    POCT SO2, Arterial 100 94 - 100 %    POCT Oxy Hemoglobin, Arterial 96.5 94.0 - 98.0 %    POCT Hematocrit Calculated, Arterial 26.0 (L) 41.0 - 52.0 %    POCT Sodium, Arterial 134 (L) 136 - 145 mmol/L    POCT Potassium, Arterial 4.7 3.5 - 5.3 mmol/L    POCT Chloride, Arterial 104 98 - 107 mmol/L    POCT Ionized Calcium, Arterial 1.14 1.10 - 1.33 mmol/L    POCT Glucose, Arterial 81 74 - 99 mg/dL    POCT Lactate, Arterial 1.8 0.4 - 2.0 mmol/L    POCT Base Excess, Arterial -2.9 (L) -2.0 - 3.0 mmol/L    POCT HCO3 Calculated, Arterial 21.9 (L) 22.0 - 26.0 mmol/L    POCT Hemoglobin, Arterial 8.6 (L) 13.5 - 17.5 g/dL    POCT Anion Gap, Arterial 13 10 - 25 mmo/L    Patient Temperature 37.0 degrees Celsius    FiO2 50 %   POCT GLUCOSE   Result Value Ref Range    POCT Glucose 85 74 - 99 mg/dL       Assessment  The patient is a 63 y.o. male with past medical history significant for but not limited to DM2, HLD, myxoid chondrosarcoma s/p wide resection sarcoma, sciatic nerve neurolysis of RLE with right gluteal reconstruction, pedicle ALT, vas lateralus flap, pedicle gluteal and perisformis flap on 3/5/24 with plastic surgery. Patient's hospital course complicated by cardiopulmonary arrest s/p CPR with ROSC after TPA, hemorrhagic shock. Patient initially underwent tracheostomy and bronchoscopy with Dr. Perkins on 4/1/24 for acute respiratory failure with  hypoxia; he experienced post-operative bleeding from trach site and returned to OR with ENT (Dr. Wolfe) on 4/4/24 for DL, bronchoscopy, esophagoscopy and exploration of neck/trach site with source of bleeding appearing to be trickling from thyroid bed down into the airway and up to the glottis. Original trach was exchanged and a 6-0 shiley proximal XLT was placed. ENT evaluated patient on 4/16/24 due to concern for bleeding from within trach at which time patient underwent tracheobronchoscopy without evidence of any dried blood or clots. Per nursing, there have not been any further episodes of bleeding from trach. He is s/p I&D of right hip with evacuation of hematoma, closure and incisional wound vac replacement on 4/18/24 with plastic surgery. He remains on full ventilatory support with FiO2 50% and PEEP 10. Trach cuff is up.     Recommendations:  - Continue routine trach care per nursing   -d/w nursing to apply split gauze inferior to trach, please notify ENT if increasing amount of secretions or signs of skin erythema or breakdown  - Please call back if patient has been off vent and not in need of positive pressure therapy for 24-48 hours and we can change to cuffless trach before discharge if deemed appropriate by primary team   - Please call with any questions or concerns.  -ENT will continue to see patient weekly while inpatient  -ENT will coordinate outpatient follow-up upon patient discharge, please notify our service if upcoming plans for discharge    Katelyn Warren PA-C  Otolaryngology- Head & Neck Surgery    ENT Consult pager: [phone]  Please page if urgent

## 2024-04-19 NOTE — PROGRESS NOTES
Department of Plastic and Reconstructive Surgery  Daily Progress Note    Patient Name: Jason Hollins  MRN: [id number]  Date:  04/19/24     Subjective   S/p I&D of right hip with evacuation of hematoma, closure and incisional wound vac replacement overnight. Drains with appropriate output. Patient awake and alert, following commands.    Objective   Physical Exam   Constitutional: Alert, critically ill.   ENMT: MMM, dobhoff in R nare.  Cardiovascular: RRR on telemetry monitor.  Respiratory/Thorax: Trach to vent.  Gastrointestinal: Abdomen soft, protuberant.   Musculoskeletal: Able to squeeze bilateral hands, wiggles toes on the left, minimal movement on RLE, effort to move foot observed.   Extremities: Generalized pitting edema. Right thigh edematous, edema improved S/P hematoma evac. Visible portion of flap recipient site and adjacent anterior R thigh incisions appear stable. Posterior flap incision line and posterior thigh/gluteal region incision dressed with incisional wound vac, maintaining low continuous suction at -50mmHg, no alarms for leak, obstruction or malfunction, no output in collecting canister. ANDERSON drain x 3 right upper leg with appropriate dark red, bloody drainage since OR.  Neurological: Off sedation, alert and able to respond to commands with head nod.   Skin: Edematous, juandice, warm.     Current Medications  Scheduled medications  albumin human, 25 g, intravenous, q8h  fludrocortisone, 0.1 mg, nasogastric tube, q12h  hydrocortisone sodium succinate, 50 mg, intravenous, q8h  [Held by provider] insulin glargine, 12 Units, subcutaneous, q12h  [Held by provider] insulin lispro, 0-20 Units, subcutaneous, q4h  meropenem, 1 g, intravenous, q24h  midodrine, 10 mg, orogastric tube, q8h  oxygen, , inhalation, Continuous - Inhalation  pantoprazole, 40 mg, intravenous, BID      Continuous medications  lactated Ringer's, 5 mL/hr, Last Rate: 5 mL/hr (04/19/24 0400)  PrismaSol 4/2.5, 3,000 mL/hr, Last Rate: 3,000  mL/hr (04/18/24 1600)      PRN medications  PRN medications: acetaminophen, alteplase, alteplase, calcium gluconate, calcium gluconate, dextrose, glucagon, heparin, heparin, HYDROmorphone, HYDROmorphone, labetaloL, magnesium sulfate, magnesium sulfate, midodrine, oxyCODONE, oxyCODONE, sodium chloride     Assessment   Jason Hollins 63 y.o. 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 Hawkins and Dr. Smith on 3/5/24. Postoperative course c/b arrest requiring CPR with ROSC after TPA for assumed large PE on 3/20. Increased swelling at flap site appreciated overnight 3/20-3/21 and pt returned to OR for exploration of R flap site, evacuation of hematoma, suture ligation of multiple bleeding vessel sites in gluteal area and wound closure with Dr. Smith on 3/21. Pt has remained in ICU for continued critical care evaluation and management postoperatively. Returned to OR 4/11 with plastic surgery for R posterior thigh/gluteal region I&D with tissue advancement/complex closure and application of incisional wound vac. Overnight on 4/18, pt with interval c/f bleeding recurrence at R thigh/gluteal region surgical site. S/P I&D of right hip with evacuation of hematoma, closure and incisional wound vac replacement with Dr. Smith on evening of 4/18.     Plan/Recommendations  - Maintain incisional wound vac to R posterior thigh wound site per plastic surgery x14 (5/2) days post-op         ·  Settings: -50 mmHg low continuous suction        ·  Monitor/record vac canister output [id number]       ·  Notify plastic surgery with any concerns of leak or obstruction  - Continue ANDERSON drain care to x3 drains present at R thigh flap reconstruction site      ·  Strip drain tubing TID and PRN     ·  Monitor and record output q8h      ·  Keep drain sites  C/D/I      ·  Notify plastics if ANDERSON drain output exceeds > 300 ml/hr (continuing to monitor closely)   - Avoid pressure application or positioning onto flap site or incisions at R upper leg   - Recommend patient be positioned off of R side as able to prevent flap compression/wound breakdown   - Continue clinitron fluidized air mattress  - Appreciate remaining supportive care per ICU   - Plastic Surgery will continue to follow     Patient and plan discussed with Dr. Stevens PAADELAIDA  Plastic and Reconstructive Surgery  Epic chat, Pager [phone], Team phones: [phone]

## 2024-04-19 NOTE — PROGRESS NOTES
Jason Hollins is a 63 y.o. male on day 45 of admission presenting with Soft tissue sarcoma (Multi).    Subjective   Interval History: Went to OR to explore R hip incision site. Closed and incisional wound vac placed. Hematoma found but no active bleeding, thought to be due to slow diffuse ooze. Transfused one unit of blood.     Remains intubated but responding some.       Review of Systems limited d/t intubation and mental status    Objective   Range of Vitals (last 24 hours)  Heart Rate:  [63-96]   Temp:  [35.6 °C (96.1 °F)-37.4 °C (99.3 °F)]   Resp:  [0-26]   BP: (100-155)/(45-79)   Weight:  [119 kg (262 lb 5.6 oz)]   SpO2:  [91 %-100 %]   Daily Weight  04/18/24 : 119 kg (262 lb 5.6 oz)    Body mass index is 37.64 kg/m².    Physical Exam  Obese, jaundiced, s/p trach  Heart rrr, lungs with vent sounds  R leg incision without clear breakdown  Abdomen soft  3 ANDERSON drains seen, more serosanguinous  Did not examine buttock but wound vac in place    Relevant Results  Labs  Results from last 72 hours   Lab Units 04/19/24  0559 04/18/24 2240 04/18/24  1835   WBC AUTO x10*3/uL 10.6 10.9 7.9   HEMOGLOBIN g/dL 9.0* 10.0* 8.8*   HEMATOCRIT % 24.8* 26.9* 23.3*   PLATELETS AUTO x10*3/uL 116* 141* 120*   LYMPHO PCT MAN % 0.8  --   --    MONO PCT MAN % 0.8  --   --    EOSINO PCT MAN % 0.0  --   --      Results from last 72 hours   Lab Units 04/19/24  0559 04/18/24  2240 04/18/24  0610   SODIUM mmol/L 137 138  138 137   POTASSIUM mmol/L 4.8 5.1  5.1 5.2   CHLORIDE mmol/L 101 101  101 98   CO2 mmol/L 22 22  22 23   BUN mg/dL 85* 99*  99* 103*   CREATININE mg/dL 2.53* 2.88*  2.88* 2.99*   GLUCOSE mg/dL 65* 68* 151*   CALCIUM mg/dL 8.0* 7.7* 8.5*   ANION GAP mmol/L 19 20  20 21*   EGFR mL/min/1.73m*2 28* 24*  24* 23*   PHOSPHORUS mg/dL 5.7* 7.2* 5.4*     Results from last 72 hours   Lab Units 04/19/24  0559 04/18/24  2240 04/18/24  0610 04/17/24  2359   ALK PHOS U/L 109 128  --  185*   BILIRUBIN TOTAL mg/dL 20.9* 20.3*  --  23.9*  "  BILIRUBIN DIRECT mg/dL  --  21.3*  --  16.5*   PROTEIN TOTAL g/dL 4.7* 4.6*  --  5.1*   ALT U/L 5* 5*  --  5*   AST U/L 87* 91*  --  98*   ALBUMIN g/dL 3.2* 3.2* 2.9* 3.1*     Estimated Creatinine Clearance: 39.1 mL/min (A) (by C-G formula based on SCr of 2.53 mg/dL (H)).    No results found for: \"CRP\"  Microbiology  Susceptibility data from last 14 days.  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 Course subsequently included PEA arrest in setting of PE 3/20, hemorrhagic shock from R thigh wound bleeding, trach 4/1 with trach revision 4/4 in setting of bleeding.     He developed a K. Aerogenes bacteremia in setting of VAP, for which he was planned for 10 days of therapy. His procedure was delayed until today, and in the setting of recent and now resolved hypotension, we were contacted regarding further management. Depending on depth of wound and results of any obtained cultures, we can finalize a course.     04/14/2024 Update:       4/3/2024 BAL growing Klebsiella aerogenes       4/3/2024 blood growing  Klebsiella aerogenes       4/11/2024 surgical specimen from gluteal closure and reconstruction growing Klebsiella aerogenes.          I reviewed susceptibility testing patterns for all 3 isolates.  Some subtle differences but suspect these are all similar reflecting patient's colonization status with Klebsiella originates.  On 4/11/2024 patient had tissue advancement " and closure of complex right leg and buttock wound.  Large area of necrotic tissue was removed and culture positive for Klebsiella aerogenes.       Therefore would extend meropenem treatment course an additional 7 days while monitoring for postoperative healing or complications.           THUS new stop date tentatively set in 7 days would be 4/21/2024.  At that time would need to reassess and consider stopping or extending further depending on clinical status and wound healing.     04/19/2024 Update: OR on 4/18 with finding of hematoma and diffuse bleed but per verbal report from plastics, no infection or necrotic tissue and no obvious single source of bleed. No cultures thus obtained. Plan will continue meropenem for 2 weeks from this OR date.     Recommendations:  - continue meropenem thru 5/2 - note dose with CVVH should be 1g q12h. Please regularly check dosing with pharmacy as renal function changes.    Discussed with Dr. Mason  ID will sign off. Please reach out with any questions.     Debbie Adam MD  PGY-5 ID Fellow  Team A - pager [phone]  For new consults, page [phone]

## 2024-04-19 NOTE — PROGRESS NOTES
PLAN: 4/18: Patient bleeding into RLE given increased ANDERSON drain output and non-incrementing Hgb despite transfusion. Return to OR for washout- return to SICU post-op for management.     PAYOR: Maryse Commercial     DISPO: LTAC    SUPPORT/CONTACT: Lakisha [phone]

## 2024-04-19 NOTE — CARE PLAN
The patient's goals for the shift include anna    The clinical goals for the shift include pt will be HDS throughout shift    Over the shift, the patient did not make progress toward the following goals. Barriers to progression include having a MAP less than 65. Recommendations to address these barriers include frequent monitoring, administration of PRN blood pressure support medications..      Problem: Diabetes  Goal: Achieve decreasing blood glucose levels by end of shift  Outcome: Progressing  Goal: Increase stability of blood glucose readings by end of shift  Outcome: Progressing  Goal: Decrease in ketones present in urine by end of shift  Outcome: Progressing  Goal: Maintain electrolyte levels within acceptable range throughout shift  Outcome: Progressing  Goal: Maintain glucose levels >70mg/dl to <250mg/dl throughout shift  Outcome: Progressing  Goal: No changes in neurological exam by end of shift  Outcome: Progressing  Goal: Learn about and adhere to nutrition recommendations by end of shift  Outcome: Progressing  Goal: Vital signs within normal range for age by end of shift  Outcome: Progressing  Goal: Increase self care and/or family involovement by end of shift  Outcome: Progressing  Goal: Receive DSME education by end of shift  Outcome: Progressing     Problem: Pain  Goal: Takes deep breaths with improved pain control throughout the shift  Outcome: Progressing  Goal: Turns in bed with improved pain control throughout the shift  Outcome: Progressing  Goal: Walks with improved pain control throughout the shift  Outcome: Progressing  Goal: Performs ADL's with improved pain control throughout shift  Outcome: Progressing  Goal: Participates in PT with improved pain control throughout the shift  Outcome: Progressing     Problem: Skin  Goal: Prevent/manage excess moisture  Outcome: Progressing  Flowsheets (Taken 4/19/2024 0316)  Prevent/manage excess moisture:   Monitor for/manage infection if present   Follow  provider orders for dressing changes  Goal: Prevent/minimize sheer/friction injuries  Outcome: Progressing  Flowsheets (Taken 4/19/2024 0316)  Prevent/minimize sheer/friction injuries:   Complete micro-shifts as needed if patient unable. Adjust patient position to relieve pressure points, not a full turn   Increase activity/out of bed for meals   Use pull sheet   HOB 30 degrees or less   Turn/reposition every 2 hours/use positioning/transfer devices  Goal: Promote/optimize nutrition  Outcome: Progressing  Flowsheets (Taken 4/19/2024 0316)  Promote/optimize nutrition:   Monitor/record intake including meals   Offer water/supplements/favorite foods   Discuss with provider if NPO > 2 days  Goal: Promote skin healing  Outcome: Progressing  Flowsheets (Taken 4/19/2024 0316)  Promote skin healing:   Assess skin/pad under line(s)/device(s)   Protective dressings over bony prominences   Turn/reposition every 2 hours/use positioning/transfer devices   Ensure correct size (line/device) and apply per  instructions   Rotate device position/do not position patient on device     Problem: Pain - Adult  Goal: Verbalizes/displays adequate comfort level or baseline comfort level  Outcome: Progressing     Problem: Safety - Adult  Goal: Free from fall injury  Outcome: Progressing     Problem: Discharge Planning  Goal: Discharge to home or other facility with appropriate resources  Outcome: Progressing     Problem: Chronic Conditions and Co-morbidities  Goal: Patient's chronic conditions and co-morbidity symptoms are monitored and maintained or improved  Outcome: Progressing     Problem: Knowledge Deficit  Goal: Patient/family/caregiver demonstrates understanding of disease process, treatment plan, medications, and discharge instructions  Outcome: Progressing     Problem: Mechanical Ventilation  Goal: Patient Will Maintain Patent Airway  Outcome: Progressing  Goal: Oral health is maintained or improved  Outcome:  Progressing  Goal: Tracheostomy will be managed safely  Outcome: Progressing  Goal: ET tube will be managed safely  Outcome: Progressing  Goal: Ability to express needs and understand communication  Outcome: Progressing  Goal: Mobility/activity is maintained at optimum level for patient  Outcome: Progressing

## 2024-04-19 NOTE — BRIEF OP NOTE
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  Surgeons      * Angy Smith - Primary    Resident/Fellow/Other Assistant:  Surgeons and Role:     * PHILLIP Baez-CNP - Resident - Assisting    Procedure Summary  Anesthesia: General  ASA: IV  Anesthesia Staff: Anesthesiologist: Palmira Chris MD  Anesthesia Resident: Rosmery Pérez MD; Chay Pham MD  Estimated Blood Loss: 780 mL  Intra-op Medications:   Administrations occurring from 1900 to 2100 on 24:   Medication Name Total Dose   sodium chloride 0.9 % irrigation solution 1,000 mL   acetaminophen (Tylenol) oral liquid 650 mg Cannot be calculated   albumin human 25 % solution 25 g Cannot be calculated   alteplase (Cathflo Activase) injection 2 mg Cannot be calculated   alteplase (Cathflo Activase) injection 2 mg Cannot be calculated   calcium gluconate in NS IV 1 g Cannot be calculated   calcium gluconate in NS IV 2 g Cannot be calculated   dextrose 50 % injection 12.5 g Cannot be calculated   fludrocortisone (Florinef) tablet 0.1 mg Cannot be calculated   glucagon (Glucagen) injection 1 mg Cannot be calculated   heparin 1,000 unit/mL injection 1,000 Units Cannot be calculated   heparin 1,000 unit/mL injection 1,000 Units Cannot be calculated   hydrocortisone sod succ (PF) (Solu-CORTEF) injection 50 mg Cannot be calculated    insulin glargine (Lantus) injection 12 Units Cannot be calculated   insulin lispro (HumaLOG) injection 0-20 Units Cannot be calculated   labetaloL (Normodyne,Trandate) injection 10 mg Cannot be calculated   magnesium sulfate IV 2 g Cannot be calculated   magnesium sulfate IV 4 g Cannot be calculated   meropenem (Merrem) in sodium chloride 0.9 % 100 mL IV 1 g 100 mL   midodrine (Proamatine) tablet 10 mg Cannot be calculated   midodrine (Proamatine) tablet 10 mg Cannot be calculated   oxyCODONE (Roxicodone) immediate release tablet 10 mg Cannot be calculated   oxyCODONE (Roxicodone) immediate release tablet 5 mg Cannot be calculated   oxygen (O2) therapy Cannot be calculated   pantoprazole (ProtoNix) injection 40 mg Cannot be calculated   sodium chloride (Ocean) 0.65 % nasal spray 1 spray Cannot be calculated              Anesthesia Record               Intraprocedure I/O Totals          Intake    PRBC 350.00 mL    Phenylephrine Drip 0.00 mL    The total shown is the total volume documented since Anesthesia Start was filed.    Cell Saver 1287 mL    Total Intake 1637 mL          Specimen: No specimens collected     Staff:   Circulator: Mikal Campbell RN; Amanda Smith RN  Relief Circulator: Verito Lanza RN  Relief Scrub: Poppy Carias  Scrub Person: Junior Freitaseric Smith  Phone Number: [phone]

## 2024-04-20 ENCOUNTER — APPOINTMENT (OUTPATIENT)
Dept: RADIOLOGY | Facility: HOSPITAL | Age: 64
DRG: 003 | End: 2024-04-20
Payer: COMMERCIAL

## 2024-04-20 LAB
ALBUMIN SERPL BCP-MCNC: 3.3 G/DL (ref 3.4–5)
ALBUMIN SERPL BCP-MCNC: 3.4 G/DL (ref 3.4–5)
ALBUMIN SERPL BCP-MCNC: 3.6 G/DL (ref 3.4–5)
ALP SERPL-CCNC: 115 U/L (ref 33–136)
ALP SERPL-CCNC: 137 U/L (ref 33–136)
ALT SERPL W P-5'-P-CCNC: 6 U/L (ref 10–52)
ALT SERPL W P-5'-P-CCNC: 7 U/L (ref 10–52)
ANION GAP BLDA CALCULATED.4IONS-SCNC: 12 MMO/L (ref 10–25)
ANION GAP BLDA CALCULATED.4IONS-SCNC: 13 MMO/L (ref 10–25)
ANION GAP BLDA CALCULATED.4IONS-SCNC: ABNORMAL MMOL/L
ANION GAP SERPL CALC-SCNC: 18 MMOL/L (ref 10–20)
ANION GAP SERPL CALC-SCNC: 19 MMOL/L (ref 10–20)
ANION GAP SERPL CALC-SCNC: 19 MMOL/L (ref 10–20)
APTT PPP: 40 SECONDS (ref 27–38)
APTT PPP: 42 SECONDS (ref 27–38)
APTT PPP: 43 SECONDS (ref 27–38)
AST SERPL W P-5'-P-CCNC: 87 U/L (ref 9–39)
AST SERPL W P-5'-P-CCNC: 87 U/L (ref 9–39)
BASE EXCESS BLDA CALC-SCNC: -0.8 MMOL/L (ref -2–3)
BASE EXCESS BLDA CALC-SCNC: -0.9 MMOL/L (ref -2–3)
BASE EXCESS BLDA CALC-SCNC: -2.6 MMOL/L (ref -2–3)
BASE EXCESS BLDA CALC-SCNC: -3.1 MMOL/L (ref -2–3)
BASE EXCESS BLDA CALC-SCNC: -3.9 MMOL/L (ref -2–3)
BILIRUB DIRECT SERPL-MCNC: 14.8 MG/DL (ref 0–0.3)
BILIRUB DIRECT SERPL-MCNC: 16.2 MG/DL (ref 0–0.3)
BILIRUB SERPL-MCNC: 21.6 MG/DL (ref 0–1.2)
BILIRUB SERPL-MCNC: 22.6 MG/DL (ref 0–1.2)
BODY TEMPERATURE: 37 DEGREES CELSIUS
BUN SERPL-MCNC: 45 MG/DL (ref 6–23)
BUN SERPL-MCNC: 51 MG/DL (ref 6–23)
BUN SERPL-MCNC: 57 MG/DL (ref 6–23)
CA-I BLD-SCNC: 1.11 MMOL/L (ref 1.1–1.33)
CA-I BLD-SCNC: 1.12 MMOL/L (ref 1.1–1.33)
CA-I BLD-SCNC: 1.13 MMOL/L (ref 1.1–1.33)
CA-I BLDA-SCNC: 1.11 MMOL/L (ref 1.1–1.33)
CA-I BLDA-SCNC: 1.12 MMOL/L (ref 1.1–1.33)
CA-I BLDA-SCNC: 1.16 MMOL/L (ref 1.1–1.33)
CA-I BLDA-SCNC: 1.16 MMOL/L (ref 1.1–1.33)
CA-I BLDA-SCNC: 1.18 MMOL/L (ref 1.1–1.33)
CALCIUM SERPL-MCNC: 8.4 MG/DL (ref 8.6–10.6)
CALCIUM SERPL-MCNC: 8.5 MG/DL (ref 8.6–10.6)
CALCIUM SERPL-MCNC: 8.6 MG/DL (ref 8.6–10.6)
CHLORIDE BLDA-SCNC: 102 MMOL/L (ref 98–107)
CHLORIDE BLDA-SCNC: 103 MMOL/L (ref 98–107)
CHLORIDE BLDA-SCNC: 104 MMOL/L (ref 98–107)
CHLORIDE BLDA-SCNC: 106 MMOL/L (ref 98–107)
CHLORIDE BLDA-SCNC: ABNORMAL MMOL/L
CHLORIDE SERPL-SCNC: 101 MMOL/L (ref 98–107)
CHLORIDE SERPL-SCNC: 101 MMOL/L (ref 98–107)
CHLORIDE SERPL-SCNC: 99 MMOL/L (ref 98–107)
CO2 SERPL-SCNC: 22 MMOL/L (ref 21–32)
CO2 SERPL-SCNC: 23 MMOL/L (ref 21–32)
CO2 SERPL-SCNC: 23 MMOL/L (ref 21–32)
CREAT SERPL-MCNC: 1.4 MG/DL (ref 0.5–1.3)
CREAT SERPL-MCNC: 1.61 MG/DL (ref 0.5–1.3)
CREAT SERPL-MCNC: 1.7 MG/DL (ref 0.5–1.3)
EGFRCR SERPLBLD CKD-EPI 2021: 45 ML/MIN/1.73M*2
EGFRCR SERPLBLD CKD-EPI 2021: 48 ML/MIN/1.73M*2
EGFRCR SERPLBLD CKD-EPI 2021: 57 ML/MIN/1.73M*2
ERYTHROCYTE [DISTWIDTH] IN BLOOD BY AUTOMATED COUNT: 18.7 % (ref 11.5–14.5)
ERYTHROCYTE [DISTWIDTH] IN BLOOD BY AUTOMATED COUNT: 19 % (ref 11.5–14.5)
ERYTHROCYTE [DISTWIDTH] IN BLOOD BY AUTOMATED COUNT: 19.2 % (ref 11.5–14.5)
FIBRINOGEN PPP-MCNC: 231 MG/DL (ref 200–400)
FIBRINOGEN PPP-MCNC: 238 MG/DL (ref 200–400)
GLUCOSE BLD MANUAL STRIP-MCNC: 124 MG/DL (ref 74–99)
GLUCOSE BLD MANUAL STRIP-MCNC: 126 MG/DL (ref 74–99)
GLUCOSE BLD MANUAL STRIP-MCNC: 128 MG/DL (ref 74–99)
GLUCOSE BLD MANUAL STRIP-MCNC: 132 MG/DL (ref 74–99)
GLUCOSE BLD MANUAL STRIP-MCNC: 133 MG/DL (ref 74–99)
GLUCOSE BLD MANUAL STRIP-MCNC: 138 MG/DL (ref 74–99)
GLUCOSE BLD MANUAL STRIP-MCNC: 158 MG/DL (ref 74–99)
GLUCOSE BLD MANUAL STRIP-MCNC: 175 MG/DL (ref 74–99)
GLUCOSE BLD MANUAL STRIP-MCNC: 181 MG/DL (ref 74–99)
GLUCOSE BLD MANUAL STRIP-MCNC: 197 MG/DL (ref 74–99)
GLUCOSE BLDA-MCNC: 123 MG/DL (ref 74–99)
GLUCOSE BLDA-MCNC: 129 MG/DL (ref 74–99)
GLUCOSE BLDA-MCNC: 135 MG/DL (ref 74–99)
GLUCOSE BLDA-MCNC: 179 MG/DL (ref 74–99)
GLUCOSE BLDA-MCNC: 210 MG/DL (ref 74–99)
GLUCOSE SERPL-MCNC: 126 MG/DL (ref 74–99)
GLUCOSE SERPL-MCNC: 143 MG/DL (ref 74–99)
GLUCOSE SERPL-MCNC: 174 MG/DL (ref 74–99)
HCO3 BLDA-SCNC: 19.7 MMOL/L (ref 22–26)
HCO3 BLDA-SCNC: 20.2 MMOL/L (ref 22–26)
HCO3 BLDA-SCNC: 21.1 MMOL/L (ref 22–26)
HCO3 BLDA-SCNC: 23.1 MMOL/L (ref 22–26)
HCO3 BLDA-SCNC: 23.2 MMOL/L (ref 22–26)
HCT VFR BLD AUTO: 21.8 % (ref 41–52)
HCT VFR BLD AUTO: 22.7 % (ref 41–52)
HCT VFR BLD AUTO: 23.3 % (ref 41–52)
HCT VFR BLD EST: 23 % (ref 41–52)
HCT VFR BLD EST: 24 % (ref 41–52)
HCT VFR BLD EST: 26 % (ref 41–52)
HCT VFR BLD EST: 26 % (ref 41–52)
HCT VFR BLD EST: 27 % (ref 41–52)
HGB BLD-MCNC: 8 G/DL (ref 13.5–17.5)
HGB BLD-MCNC: 8.4 G/DL (ref 13.5–17.5)
HGB BLD-MCNC: 8.5 G/DL (ref 13.5–17.5)
HGB BLDA-MCNC: 7.6 G/DL (ref 13.5–17.5)
HGB BLDA-MCNC: 8 G/DL (ref 13.5–17.5)
HGB BLDA-MCNC: 8.8 G/DL (ref 13.5–17.5)
HGB BLDA-MCNC: 8.8 G/DL (ref 13.5–17.5)
HGB BLDA-MCNC: 9.1 G/DL (ref 13.5–17.5)
INHALED O2 CONCENTRATION: 40 %
INHALED O2 CONCENTRATION: 40 %
INHALED O2 CONCENTRATION: 50 %
INR PPP: 1.5 (ref 0.9–1.1)
INR PPP: 1.6 (ref 0.9–1.1)
INR PPP: 1.7 (ref 0.9–1.1)
LACTATE BLDA-SCNC: 1.4 MMOL/L (ref 0.4–2)
LACTATE BLDA-SCNC: 1.5 MMOL/L (ref 0.4–2)
LACTATE BLDA-SCNC: 1.5 MMOL/L (ref 0.4–2)
LACTATE BLDA-SCNC: 1.8 MMOL/L (ref 0.4–2)
LACTATE BLDA-SCNC: 1.8 MMOL/L (ref 0.4–2)
MAGNESIUM SERPL-MCNC: 2.32 MG/DL (ref 1.6–2.4)
MAGNESIUM SERPL-MCNC: 2.38 MG/DL (ref 1.6–2.4)
MAGNESIUM SERPL-MCNC: 2.49 MG/DL (ref 1.6–2.4)
MCH RBC QN AUTO: 29 PG (ref 26–34)
MCH RBC QN AUTO: 29.1 PG (ref 26–34)
MCH RBC QN AUTO: 29.1 PG (ref 26–34)
MCHC RBC AUTO-ENTMCNC: 36.5 G/DL (ref 32–36)
MCHC RBC AUTO-ENTMCNC: 36.7 G/DL (ref 32–36)
MCHC RBC AUTO-ENTMCNC: 37 G/DL (ref 32–36)
MCV RBC AUTO: 79 FL (ref 80–100)
MCV RBC AUTO: 79 FL (ref 80–100)
MCV RBC AUTO: 80 FL (ref 80–100)
NRBC BLD-RTO: 0.7 /100 WBCS (ref 0–0)
NRBC BLD-RTO: 0.7 /100 WBCS (ref 0–0)
NRBC BLD-RTO: 0.8 /100 WBCS (ref 0–0)
OXYHGB MFR BLDA: 90.8 % (ref 94–98)
OXYHGB MFR BLDA: 91.7 % (ref 94–98)
OXYHGB MFR BLDA: 94.2 % (ref 94–98)
OXYHGB MFR BLDA: 95.5 % (ref 94–98)
OXYHGB MFR BLDA: 95.9 % (ref 94–98)
PCO2 BLDA: 29 MM HG (ref 38–42)
PCO2 BLDA: 29 MM HG (ref 38–42)
PCO2 BLDA: 31 MM HG (ref 38–42)
PCO2 BLDA: 34 MM HG (ref 38–42)
PCO2 BLDA: 35 MM HG (ref 38–42)
PH BLDA: 7.43 PH (ref 7.38–7.42)
PH BLDA: 7.44 PH (ref 7.38–7.42)
PH BLDA: 7.45 PH (ref 7.38–7.42)
PHOSPHATE SERPL-MCNC: 2.8 MG/DL (ref 2.5–4.9)
PHOSPHATE SERPL-MCNC: 3.3 MG/DL (ref 2.5–4.9)
PHOSPHATE SERPL-MCNC: 3.4 MG/DL (ref 2.5–4.9)
PLATELET # BLD AUTO: 104 X10*3/UL (ref 150–450)
PLATELET # BLD AUTO: 122 X10*3/UL (ref 150–450)
PLATELET # BLD AUTO: 96 X10*3/UL (ref 150–450)
PO2 BLDA: 64 MM HG (ref 85–95)
PO2 BLDA: 64 MM HG (ref 85–95)
PO2 BLDA: 73 MM HG (ref 85–95)
PO2 BLDA: 81 MM HG (ref 85–95)
PO2 BLDA: 87 MM HG (ref 85–95)
POTASSIUM BLDA-SCNC: 4.1 MMOL/L (ref 3.5–5.3)
POTASSIUM BLDA-SCNC: 4.3 MMOL/L (ref 3.5–5.3)
POTASSIUM BLDA-SCNC: 4.4 MMOL/L (ref 3.5–5.3)
POTASSIUM BLDA-SCNC: 4.9 MMOL/L (ref 3.5–5.3)
POTASSIUM BLDA-SCNC: 4.9 MMOL/L (ref 3.5–5.3)
POTASSIUM SERPL-SCNC: 4.5 MMOL/L (ref 3.5–5.3)
POTASSIUM SERPL-SCNC: 4.7 MMOL/L (ref 3.5–5.3)
POTASSIUM SERPL-SCNC: 4.7 MMOL/L (ref 3.5–5.3)
PROT SERPL-MCNC: 4.7 G/DL (ref 6.4–8.2)
PROT SERPL-MCNC: 5.2 G/DL (ref 6.4–8.2)
PROTHROMBIN TIME: 17.5 SECONDS (ref 9.8–12.8)
PROTHROMBIN TIME: 17.9 SECONDS (ref 9.8–12.8)
PROTHROMBIN TIME: 19.2 SECONDS (ref 9.8–12.8)
RBC # BLD AUTO: 2.75 X10*6/UL (ref 4.5–5.9)
RBC # BLD AUTO: 2.89 X10*6/UL (ref 4.5–5.9)
RBC # BLD AUTO: 2.93 X10*6/UL (ref 4.5–5.9)
SAO2 % BLDA: 94 % (ref 94–100)
SAO2 % BLDA: 94 % (ref 94–100)
SAO2 % BLDA: 98 % (ref 94–100)
SAO2 % BLDA: 98 % (ref 94–100)
SAO2 % BLDA: 99 % (ref 94–100)
SODIUM BLDA-SCNC: 132 MMOL/L (ref 136–145)
SODIUM BLDA-SCNC: 133 MMOL/L (ref 136–145)
SODIUM BLDA-SCNC: 134 MMOL/L (ref 136–145)
SODIUM SERPL-SCNC: 136 MMOL/L (ref 136–145)
SODIUM SERPL-SCNC: 137 MMOL/L (ref 136–145)
SODIUM SERPL-SCNC: 137 MMOL/L (ref 136–145)
STAPHYLOCOCCUS SPEC CULT: NORMAL
WBC # BLD AUTO: 10.6 X10*3/UL (ref 4.4–11.3)
WBC # BLD AUTO: 5.7 X10*3/UL (ref 4.4–11.3)
WBC # BLD AUTO: 7.8 X10*3/UL (ref 4.4–11.3)

## 2024-04-20 PROCEDURE — 71045 X-RAY EXAM CHEST 1 VIEW: CPT

## 2024-04-20 PROCEDURE — 90937 HEMODIALYSIS REPEATED EVAL: CPT

## 2024-04-20 PROCEDURE — 82330 ASSAY OF CALCIUM: CPT | Performed by: STUDENT IN AN ORGANIZED HEALTH CARE EDUCATION/TRAINING PROGRAM

## 2024-04-20 PROCEDURE — 99233 SBSQ HOSP IP/OBS HIGH 50: CPT

## 2024-04-20 PROCEDURE — P9047 ALBUMIN (HUMAN), 25%, 50ML: HCPCS | Mod: JZ | Performed by: STUDENT IN AN ORGANIZED HEALTH CARE EDUCATION/TRAINING PROGRAM

## 2024-04-20 PROCEDURE — 2500000004 HC RX 250 GENERAL PHARMACY W/ HCPCS (ALT 636 FOR OP/ED): Performed by: STUDENT IN AN ORGANIZED HEALTH CARE EDUCATION/TRAINING PROGRAM

## 2024-04-20 PROCEDURE — 2500000001 HC RX 250 WO HCPCS SELF ADMINISTERED DRUGS (ALT 637 FOR MEDICARE OP): Performed by: STUDENT IN AN ORGANIZED HEALTH CARE EDUCATION/TRAINING PROGRAM

## 2024-04-20 PROCEDURE — 85384 FIBRINOGEN ACTIVITY: CPT

## 2024-04-20 PROCEDURE — 82248 BILIRUBIN DIRECT: CPT | Performed by: STUDENT IN AN ORGANIZED HEALTH CARE EDUCATION/TRAINING PROGRAM

## 2024-04-20 PROCEDURE — 2500000002 HC RX 250 W HCPCS SELF ADMINISTERED DRUGS (ALT 637 FOR MEDICARE OP, ALT 636 FOR OP/ED)

## 2024-04-20 PROCEDURE — 71045 X-RAY EXAM CHEST 1 VIEW: CPT | Performed by: RADIOLOGY

## 2024-04-20 PROCEDURE — 85610 PROTHROMBIN TIME: CPT | Performed by: STUDENT IN AN ORGANIZED HEALTH CARE EDUCATION/TRAINING PROGRAM

## 2024-04-20 PROCEDURE — 2500000001 HC RX 250 WO HCPCS SELF ADMINISTERED DRUGS (ALT 637 FOR MEDICARE OP)

## 2024-04-20 PROCEDURE — 84132 ASSAY OF SERUM POTASSIUM: CPT | Performed by: STUDENT IN AN ORGANIZED HEALTH CARE EDUCATION/TRAINING PROGRAM

## 2024-04-20 PROCEDURE — 94003 VENT MGMT INPAT SUBQ DAY: CPT

## 2024-04-20 PROCEDURE — 85027 COMPLETE CBC AUTOMATED: CPT | Performed by: STUDENT IN AN ORGANIZED HEALTH CARE EDUCATION/TRAINING PROGRAM

## 2024-04-20 PROCEDURE — 2020000001 HC ICU ROOM DAILY

## 2024-04-20 PROCEDURE — 99024 POSTOP FOLLOW-UP VISIT: CPT | Performed by: PHYSICIAN ASSISTANT

## 2024-04-20 PROCEDURE — 84100 ASSAY OF PHOSPHORUS: CPT | Performed by: STUDENT IN AN ORGANIZED HEALTH CARE EDUCATION/TRAINING PROGRAM

## 2024-04-20 PROCEDURE — 2500000004 HC RX 250 GENERAL PHARMACY W/ HCPCS (ALT 636 FOR OP/ED): Mod: JZ | Performed by: STUDENT IN AN ORGANIZED HEALTH CARE EDUCATION/TRAINING PROGRAM

## 2024-04-20 PROCEDURE — 82947 ASSAY GLUCOSE BLOOD QUANT: CPT

## 2024-04-20 PROCEDURE — 2500000005 HC RX 250 GENERAL PHARMACY W/O HCPCS

## 2024-04-20 PROCEDURE — 83735 ASSAY OF MAGNESIUM: CPT | Performed by: STUDENT IN AN ORGANIZED HEALTH CARE EDUCATION/TRAINING PROGRAM

## 2024-04-20 PROCEDURE — C9113 INJ PANTOPRAZOLE SODIUM, VIA: HCPCS | Performed by: STUDENT IN AN ORGANIZED HEALTH CARE EDUCATION/TRAINING PROGRAM

## 2024-04-20 PROCEDURE — 37799 UNLISTED PX VASCULAR SURGERY: CPT | Performed by: STUDENT IN AN ORGANIZED HEALTH CARE EDUCATION/TRAINING PROGRAM

## 2024-04-20 PROCEDURE — 90945 DIALYSIS ONE EVALUATION: CPT | Performed by: INTERNAL MEDICINE

## 2024-04-20 PROCEDURE — 99291 CRITICAL CARE FIRST HOUR: CPT | Performed by: STUDENT IN AN ORGANIZED HEALTH CARE EDUCATION/TRAINING PROGRAM

## 2024-04-20 RX ORDER — OXYCODONE HCL 5 MG/5 ML
10 SOLUTION, ORAL ORAL EVERY 6 HOURS PRN
Status: DISCONTINUED | OUTPATIENT
Start: 2024-04-20 | End: 2024-04-21

## 2024-04-20 RX ORDER — INSULIN LISPRO 100 [IU]/ML
0-5 INJECTION, SOLUTION INTRAVENOUS; SUBCUTANEOUS EVERY 4 HOURS
Status: DISCONTINUED | OUTPATIENT
Start: 2024-04-20 | End: 2024-04-21

## 2024-04-20 RX ORDER — NOREPINEPHRINE BITARTRATE/D5W 8 MG/250ML
.01-1 PLASTIC BAG, INJECTION (ML) INTRAVENOUS CONTINUOUS
Status: DISCONTINUED | OUTPATIENT
Start: 2024-04-20 | End: 2024-04-26

## 2024-04-20 RX ORDER — MIDODRINE HYDROCHLORIDE 10 MG/1
20 TABLET ORAL EVERY 8 HOURS
Status: DISCONTINUED | OUTPATIENT
Start: 2024-04-20 | End: 2024-04-26

## 2024-04-20 RX ORDER — DOCUSATE SODIUM 50 MG/5ML
100 LIQUID ORAL 2 TIMES DAILY
Status: DISCONTINUED | OUTPATIENT
Start: 2024-04-20 | End: 2024-04-24

## 2024-04-20 RX ORDER — MIDODRINE HYDROCHLORIDE 10 MG/1
15 TABLET ORAL EVERY 8 HOURS
Status: DISCONTINUED | OUTPATIENT
Start: 2024-04-20 | End: 2024-04-20

## 2024-04-20 RX ORDER — OXYCODONE HCL 5 MG/5 ML
5 SOLUTION, ORAL ORAL EVERY 6 HOURS PRN
Status: DISCONTINUED | OUTPATIENT
Start: 2024-04-20 | End: 2024-04-21

## 2024-04-20 RX ORDER — ESOMEPRAZOLE MAGNESIUM 40 MG/1
40 GRANULE, DELAYED RELEASE ORAL DAILY
Status: DISCONTINUED | OUTPATIENT
Start: 2024-04-21 | End: 2024-04-24

## 2024-04-20 RX ADMIN — MIDODRINE HYDROCHLORIDE 15 MG: 10 TABLET ORAL at 09:32

## 2024-04-20 RX ADMIN — HYDROCORTISONE SODIUM SUCCINATE 50 MG: 100 INJECTION, POWDER, FOR SOLUTION INTRAMUSCULAR; INTRAVENOUS at 18:31

## 2024-04-20 RX ADMIN — ALBUMIN HUMAN 25 G: 0.25 SOLUTION INTRAVENOUS at 22:51

## 2024-04-20 RX ADMIN — NOREPINEPHRINE BITARTRATE 0.01 MCG/KG/MIN: 8 INJECTION, SOLUTION INTRAVENOUS at 10:26

## 2024-04-20 RX ADMIN — MIDODRINE HYDROCHLORIDE 10 MG: 10 TABLET ORAL at 02:56

## 2024-04-20 RX ADMIN — INSULIN LISPRO 1 UNITS: 100 INJECTION, SOLUTION INTRAVENOUS; SUBCUTANEOUS at 20:35

## 2024-04-20 RX ADMIN — INSULIN LISPRO 1 UNITS: 100 INJECTION, SOLUTION INTRAVENOUS; SUBCUTANEOUS at 12:52

## 2024-04-20 RX ADMIN — HYDROCORTISONE SODIUM SUCCINATE 50 MG: 100 INJECTION, POWDER, FOR SOLUTION INTRAMUSCULAR; INTRAVENOUS at 10:25

## 2024-04-20 RX ADMIN — DOCUSATE SODIUM 100 MG: 50 LIQUID ORAL at 20:13

## 2024-04-20 RX ADMIN — INSULIN LISPRO 1 UNITS: 100 INJECTION, SOLUTION INTRAVENOUS; SUBCUTANEOUS at 17:23

## 2024-04-20 RX ADMIN — DOCUSATE SODIUM 100 MG: 50 LIQUID ORAL at 09:32

## 2024-04-20 RX ADMIN — MEROPENEM 1 G: 1 INJECTION, POWDER, FOR SOLUTION INTRAVENOUS at 06:30

## 2024-04-20 RX ADMIN — CALCIUM CHLORIDE, MAGNESIUM CHLORIDE, DEXTROSE MONOHYDRATE, LACTIC ACID, SODIUM CHLORIDE, SODIUM BICARBONATE AND POTASSIUM CHLORIDE 3000 ML/HR: 3.68; 3.05; 22; 5.4; 6.46; 3.09; .314 INJECTION INTRAVENOUS at 03:12

## 2024-04-20 RX ADMIN — MEROPENEM 1 G: 1 INJECTION, POWDER, FOR SOLUTION INTRAVENOUS at 18:31

## 2024-04-20 RX ADMIN — HYDROCORTISONE SODIUM SUCCINATE 50 MG: 100 INJECTION, POWDER, FOR SOLUTION INTRAMUSCULAR; INTRAVENOUS at 02:55

## 2024-04-20 RX ADMIN — CALCIUM CHLORIDE, MAGNESIUM CHLORIDE, DEXTROSE MONOHYDRATE, LACTIC ACID, SODIUM CHLORIDE, SODIUM BICARBONATE AND POTASSIUM CHLORIDE 3000 ML/HR: 3.68; 3.05; 22; 5.4; 6.46; 3.09; .314 INJECTION INTRAVENOUS at 03:14

## 2024-04-20 RX ADMIN — PANTOPRAZOLE SODIUM 40 MG: 40 INJECTION, POWDER, FOR SOLUTION INTRAVENOUS at 08:23

## 2024-04-20 RX ADMIN — FLUDROCORTISONE ACETATE 0.1 MG: 0.1 TABLET ORAL at 15:44

## 2024-04-20 RX ADMIN — INSULIN LISPRO 1 UNITS: 100 INJECTION, SOLUTION INTRAVENOUS; SUBCUTANEOUS at 09:31

## 2024-04-20 RX ADMIN — ALBUMIN HUMAN 25 G: 0.25 SOLUTION INTRAVENOUS at 06:31

## 2024-04-20 RX ADMIN — ALBUMIN HUMAN 25 G: 0.25 SOLUTION INTRAVENOUS at 15:44

## 2024-04-20 RX ADMIN — CALCIUM CHLORIDE, MAGNESIUM CHLORIDE, DEXTROSE MONOHYDRATE, LACTIC ACID, SODIUM CHLORIDE, SODIUM BICARBONATE AND POTASSIUM CHLORIDE 3000 ML/HR: 3.68; 3.05; 22; 5.4; 6.46; 3.09; .314 INJECTION INTRAVENOUS at 03:13

## 2024-04-20 RX ADMIN — MIDODRINE HYDROCHLORIDE 20 MG: 10 TABLET ORAL at 17:24

## 2024-04-20 RX ADMIN — FLUDROCORTISONE ACETATE 0.1 MG: 0.1 TABLET ORAL at 02:55

## 2024-04-20 RX ADMIN — OXYCODONE HYDROCHLORIDE 5 MG: 5 TABLET ORAL at 00:28

## 2024-04-20 ASSESSMENT — PAIN SCALES - GENERAL
PAINLEVEL_OUTOF10: 4
PAINLEVEL_OUTOF10: 3
PAINLEVEL_OUTOF10: 0 - NO PAIN

## 2024-04-20 ASSESSMENT — PAIN - FUNCTIONAL ASSESSMENT
PAIN_FUNCTIONAL_ASSESSMENT: 0-10

## 2024-04-20 NOTE — PROGRESS NOTES
Department of Plastic and Reconstructive Surgery  Daily Progress Note    Patient Name: Jason Hollins  MRN: [id number]  Date:  04/20/24     Subjective   Found resting in bed in surgical ICU with family members at the bedside. Pt awake and alert, following commands, gives thumbs up to inquiry of how he feels today. Incisional wound vac intact to R hip surgical site, without issue overnight. ANDERSON drain outputs appropriately down-trending, stable.     Objective   Physical Exam   Constitutional: Awake, alert, responsive, nods and gives thumbs up to questions. Appears critically ill.   ENMT: MMM, dobhoff in R nare.  Cardiovascular: RRR on telemetry monitor.  Respiratory/Thorax: Trach to vent.  Gastrointestinal: Abdomen soft, protuberant.   Musculoskeletal: Able to squeeze bilateral hands, wiggles toes on the left, minimal movement on RLE, effort to move foot observed.   Extremities: Generalized pitting edema. Right thigh edematous, edema improved S/P hematoma evacuation. Visible portion of flap recipient site and adjacent anterior R thigh incisions appear stable. Posterior flap incision line and posterior thigh/gluteal region incision dressed with incisional wound vac, maintaining low continuous suction at -50mmHg, no alarms for leak, obstruction or malfunction, no output in collecting canister. ANDERSON drain x 3 right upper leg with appropriate dark red, bloody drainage since OR.  Neurological: Off sedation, alert and able to respond to commands with head nod.   Skin: Edematous, juandice, warm.     Current Medications  Scheduled medications  albumin human, 25 g, intravenous, q8h  docusate sodium, 100 mg, nasogastric tube, BID  [START ON 4/21/2024] esomeprazole, 40 mg, nasogastric tube, Daily  fludrocortisone, 0.1 mg, nasogastric tube, q12h  hydrocortisone sodium succinate, 50 mg, intravenous, q8h  [Held by provider] insulin glargine, 12 Units, subcutaneous, q12h  insulin lispro, 0-5 Units, subcutaneous, q4h  meropenem, 1 g,  intravenous, q12h  midodrine, 20 mg, orogastric tube, q8h  oxygen, , inhalation, Continuous - Inhalation      Continuous medications  lactated Ringer's, 5 mL/hr, Last Rate: 5 mL/hr (04/20/24 0400)  norepinephrine, 0.01-0.05 mcg/kg/min (Dosing Weight), Last Rate: 0.01 mcg/kg/min (04/20/24 1026)  PrismaSol 4/2.5, 3,000 mL/hr, Last Rate: 3,000 mL/hr (04/20/24 0314)      PRN medications  PRN medications: acetaminophen, alteplase, alteplase, calcium gluconate, calcium gluconate, dextrose, glucagon, heparin, heparin, labetaloL, magnesium sulfate, magnesium sulfate, midodrine, oxyCODONE, oxyCODONE, sodium chloride     Assessment   Jason Gunjan 63 y.o. 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 Hawkins and Dr. Smith on 3/5/24. Postoperative course c/b arrest requiring CPR with ROSC after TPA for assumed large PE on 3/20. Increased swelling at flap site appreciated overnight 3/20-3/21 and pt returned to OR for exploration of R flap site, evacuation of hematoma, suture ligation of multiple bleeding vessel sites in gluteal area and wound closure with Dr. Smith on 3/21. Pt has remained in ICU for continued critical care evaluation and management postoperatively. Returned to OR 4/11 with plastic surgery for R posterior thigh/gluteal region I&D with tissue advancement/complex closure and application of incisional wound vac. Overnight on 4/18, pt with interval c/f bleeding recurrence at R thigh/gluteal region surgical site. S/P I&D of right hip with evacuation of hematoma, closure and incisional wound vac replacement with Dr. Smith on evening of 4/18.     Plan/Recommendations  - Maintain incisional wound vac to R posterior thigh wound site per plastic surgery x14 (5/2) days post-op         ·  Settings: -50 mmHg low continuous  suction        ·  Monitor/record vac canister output [id number]       ·  Notify plastic surgery with any concerns of leak or obstruction  - Continue ANDERSON drain care to x3 drains present at R thigh flap reconstruction site      ·  Strip drain tubing TID and PRN     ·  Monitor and record output q8h      ·  Keep drain sites C/D/I      ·  Notify plastics if ANDERSON drain output exceeds > 300 ml/hr (continuing to monitor closely)   - Avoid pressure application or positioning onto flap site or incisions at R upper leg   - Recommend patient be positioned off of R side as able to prevent flap compression/wound breakdown   - Continue clinitron fluidized air mattress  - Appreciate remaining supportive care per ICU   - Plastic Surgery will continue to follow     Patient and plan discussed with Dr. Smith.     Gracie Smith PA-C  Plastic and Reconstructive Surgery   Pager #[phone]  Team phones: [phone]

## 2024-04-20 NOTE — PROGRESS NOTES
Jason Hollins is a 63 y.o. male on day 46 of admission presenting with Soft tissue sarcoma (Multi).    Subjective   No acute events overnight. Remains on CVVH without fluid removal.  Net positive 1.1L for past 24hrs.    Objective     Physical Exam  Constitutional:       General: He is not in acute distress.     Appearance: He is obese. He is ill-appearing.   HENT:      Nose:      Comments: Dobhoff in R nare, bridled in place.      Mouth/Throat:      Mouth: Mucous membranes are moist.   Eyes:      General: Scleral icterus present.      Extraocular Movements: Extraocular movements intact.      Pupils: Pupils are equal, round, and reactive to light.   Neck:      Comments: LIJ trialysis in place, dressing c/d/i.   Cardiovascular:      Rate and Rhythm: Normal rate and regular rhythm.      Pulses:           Radial pulses are 1+ on the right side and 1+ on the left side.        Dorsalis pedis pulses are detected w/ Doppler on the right side and detected w/ Doppler on the left side.        Posterior tibial pulses are detected w/ Doppler on the right side and detected w/ Doppler on the left side.      Heart sounds: Normal heart sounds.   Pulmonary:      Comments: Diffuse inspiratory rales and diminished bases bilaterally. Currently on SIMV 50%/500/16/+10/12PS.  Abdominal:      General: Bowel sounds are normal. There is distension.      Tenderness: There is no abdominal tenderness.      Comments: Obese abdomen, firm, anasarca.   Genitourinary:     Penis: Swelling present.       Comments: Scrotal edema. Marino in place with minimal dark sharee UOP.  Musculoskeletal:      Right lower leg: 3+ Edema present.      Left lower leg: 3+ Edema present.      Comments: 3+ pitting edema noted bilaterally in upper and lower extremities. R thigh with mild swelling (improved from prior day) and with surgical incision sites clean, dry, intact. 3 ANDERSON drains and wound vac with minimal output this morning.   Skin:     General: Skin is warm and dry.      " Coloration: Skin is jaundiced.      Comments: ANDERSON drain x2 and wound vac to R gluteal flap site.   Neurological:      Mental Status: He is alert.      Comments: Alert, nodding appropriately to yes/no questions. Following simple commands with BUE and LLE, no movement with RLE.   Psychiatric:      Comments: Calm and cooperative.       Last Recorded Vitals  Blood pressure 105/62, pulse 87, temperature 35.7 °C (96.3 °F), temperature source Temporal, resp. rate (!) 28, height 1.778 m (5' 10\"), weight 135 kg (298 lb 1 oz), SpO2 96%.  Intake/Output last 3 Shifts:  I/O last 3 completed shifts:  In: 3577 (26.5 mL/kg) [I.V.:225 (1.7 mL/kg); Blood:2037; NG/GT:915; IV Piggyback:400]  Out: 820 (6.1 mL/kg) [Urine:145 (0 mL/kg/hr); Drains:725]  Weight: 135.2 kg     Relevant Results  Scheduled medications  albumin human, 25 g, intravenous, q8h  docusate sodium, 100 mg, nasogastric tube, BID  [START ON 4/21/2024] esomeprazole, 40 mg, nasogastric tube, Daily  fludrocortisone, 0.1 mg, nasogastric tube, q12h  hydrocortisone sodium succinate, 50 mg, intravenous, q8h  [Held by provider] insulin glargine, 12 Units, subcutaneous, q12h  insulin lispro, 0-5 Units, subcutaneous, q4h  meropenem, 1 g, intravenous, q12h  midodrine, 20 mg, orogastric tube, q8h  oxygen, , inhalation, Continuous - Inhalation      Continuous medications  lactated Ringer's, 5 mL/hr, Last Rate: 5 mL/hr (04/20/24 0400)  norepinephrine, 0.01-0.05 mcg/kg/min (Dosing Weight), Last Rate: 0.01 mcg/kg/min (04/20/24 1026)  PrismaSol 4/2.5, 3,000 mL/hr, Last Rate: 3,000 mL/hr (04/20/24 0314)      PRN medications  PRN medications: acetaminophen, alteplase, alteplase, calcium gluconate, calcium gluconate, dextrose, glucagon, heparin, heparin, labetaloL, magnesium sulfate, magnesium sulfate, midodrine, oxyCODONE, oxyCODONE, sodium chloride  Results for orders placed or performed during the hospital encounter of 03/05/24 (from the past 24 hour(s))   POCT GLUCOSE   Result Value Ref " Range    POCT Glucose 77 74 - 99 mg/dL   POCT GLUCOSE   Result Value Ref Range    POCT Glucose 91 74 - 99 mg/dL   CALCIUM, IONIZED   Result Value Ref Range    POCT Calcium, Ionized 1.10 1.1 - 1.33 mmol/L   Blood Gas Arterial Full Panel   Result Value Ref Range    POCT pH, Arterial 7.39 7.38 - 7.42 pH    POCT pCO2, Arterial 37 (L) 38 - 42 mm Hg    POCT pO2, Arterial 96 (H) 85 - 95 mm Hg    POCT SO2, Arterial 99 94 - 100 %    POCT Oxy Hemoglobin, Arterial 95.6 94.0 - 98.0 %    POCT Hematocrit Calculated, Arterial 27.0 (L) 41.0 - 52.0 %    POCT Sodium, Arterial 134 (L) 136 - 145 mmol/L    POCT Potassium, Arterial 4.7 3.5 - 5.3 mmol/L    POCT Chloride, Arterial 104 98 - 107 mmol/L    POCT Ionized Calcium, Arterial 1.14 1.10 - 1.33 mmol/L    POCT Glucose, Arterial 86 74 - 99 mg/dL    POCT Lactate, Arterial 1.6 0.4 - 2.0 mmol/L    POCT Base Excess, Arterial -2.3 (L) -2.0 - 3.0 mmol/L    POCT HCO3 Calculated, Arterial 22.4 22.0 - 26.0 mmol/L    POCT Hemoglobin, Arterial 9.0 (L) 13.5 - 17.5 g/dL    POCT Anion Gap, Arterial 12 10 - 25 mmo/L    Patient Temperature 37.0 degrees Celsius    FiO2 50 %   CBC   Result Value Ref Range    WBC 7.8 4.4 - 11.3 x10*3/uL    nRBC 0.5 (H) 0.0 - 0.0 /100 WBCs    RBC 3.07 (L) 4.50 - 5.90 x10*6/uL    Hemoglobin 8.6 (L) 13.5 - 17.5 g/dL    Hematocrit 24.9 (L) 41.0 - 52.0 %    MCV 81 80 - 100 fL    MCH 28.0 26.0 - 34.0 pg    MCHC 34.5 32.0 - 36.0 g/dL    RDW 18.7 (H) 11.5 - 14.5 %    Platelets 105 (L) 150 - 450 x10*3/uL   Coagulation Screen   Result Value Ref Range    Protime 15.7 (H) 9.8 - 12.8 seconds    INR 1.4 (H) 0.9 - 1.1    aPTT 38 27 - 38 seconds   Magnesium   Result Value Ref Range    Magnesium 2.25 1.60 - 2.40 mg/dL   Renal Function Panel   Result Value Ref Range    Glucose 84 74 - 99 mg/dL    Sodium 139 136 - 145 mmol/L    Potassium 4.8 3.5 - 5.3 mmol/L    Chloride 102 98 - 107 mmol/L    Bicarbonate 25 21 - 32 mmol/L    Anion Gap 17 10 - 20 mmol/L    Urea Nitrogen 75 (H) 6 - 23 mg/dL     Creatinine 2.33 (H) 0.50 - 1.30 mg/dL    eGFR 31 (L) >60 mL/min/1.73m*2    Calcium 8.3 (L) 8.6 - 10.6 mg/dL    Phosphorus 4.8 2.5 - 4.9 mg/dL    Albumin 3.3 (L) 3.4 - 5.0 g/dL   POCT GLUCOSE   Result Value Ref Range    POCT Glucose 85 74 - 99 mg/dL   POCT GLUCOSE   Result Value Ref Range    POCT Glucose 89 74 - 99 mg/dL   POCT GLUCOSE   Result Value Ref Range    POCT Glucose 91 74 - 99 mg/dL   Blood Gas Arterial Full Panel   Result Value Ref Range    POCT pH, Arterial 7.37 (L) 7.38 - 7.42 pH    POCT pCO2, Arterial 37 (L) 38 - 42 mm Hg    POCT pO2, Arterial 130 (H) 85 - 95 mm Hg    POCT SO2, Arterial 99 94 - 100 %    POCT Oxy Hemoglobin, Arterial 96.3 94.0 - 98.0 %    POCT Hematocrit Calculated, Arterial 25.0 (L) 41.0 - 52.0 %    POCT Sodium, Arterial 134 (L) 136 - 145 mmol/L    POCT Potassium, Arterial 4.5 3.5 - 5.3 mmol/L    POCT Chloride, Arterial 104 98 - 107 mmol/L    POCT Ionized Calcium, Arterial 1.14 1.10 - 1.33 mmol/L    POCT Glucose, Arterial 90 74 - 99 mg/dL    POCT Lactate, Arterial 1.4 0.4 - 2.0 mmol/L    POCT Base Excess, Arterial -3.5 (L) -2.0 - 3.0 mmol/L    POCT HCO3 Calculated, Arterial 21.4 (L) 22.0 - 26.0 mmol/L    POCT Hemoglobin, Arterial 8.4 (L) 13.5 - 17.5 g/dL    POCT Anion Gap, Arterial 13 10 - 25 mmo/L    Patient Temperature 37.0 degrees Celsius    FiO2 50 %   POCT GLUCOSE   Result Value Ref Range    POCT Glucose 100 (H) 74 - 99 mg/dL   POCT GLUCOSE   Result Value Ref Range    POCT Glucose 102 (H) 74 - 99 mg/dL   Blood Gas Arterial Full Panel   Result Value Ref Range    POCT pH, Arterial 7.43 (H) 7.38 - 7.42 pH    POCT pCO2, Arterial 33 (L) 38 - 42 mm Hg    POCT pO2, Arterial 73 (L) 85 - 95 mm Hg    POCT SO2, Arterial 97 94 - 100 %    POCT Oxy Hemoglobin, Arterial 94.2 94.0 - 98.0 %    POCT Hematocrit Calculated, Arterial 26.0 (L) 41.0 - 52.0 %    POCT Sodium, Arterial 133 (L) 136 - 145 mmol/L    POCT Potassium, Arterial 4.7 3.5 - 5.3 mmol/L    POCT Chloride, Arterial 102 98 - 107 mmol/L     POCT Ionized Calcium, Arterial 1.16 1.10 - 1.33 mmol/L    POCT Glucose, Arterial 110 (H) 74 - 99 mg/dL    POCT Lactate, Arterial 1.5 0.4 - 2.0 mmol/L    POCT Base Excess, Arterial -2.0 -2.0 - 3.0 mmol/L    POCT HCO3 Calculated, Arterial 21.9 (L) 22.0 - 26.0 mmol/L    POCT Hemoglobin, Arterial 8.8 (L) 13.5 - 17.5 g/dL    POCT Anion Gap, Arterial 14 10 - 25 mmo/L    Patient Temperature 37.0 degrees Celsius    FiO2 50 %   POCT GLUCOSE   Result Value Ref Range    POCT Glucose 108 (H) 74 - 99 mg/dL   CALCIUM, IONIZED   Result Value Ref Range    POCT Calcium, Ionized 1.10 1.1 - 1.33 mmol/L   CBC   Result Value Ref Range    WBC 5.4 4.4 - 11.3 x10*3/uL    nRBC 0.7 (H) 0.0 - 0.0 /100 WBCs    RBC 2.88 (L) 4.50 - 5.90 x10*6/uL    Hemoglobin 8.3 (L) 13.5 - 17.5 g/dL    Hematocrit 22.4 (L) 41.0 - 52.0 %    MCV 78 (L) 80 - 100 fL    MCH 28.8 26.0 - 34.0 pg    MCHC 37.1 (H) 32.0 - 36.0 g/dL    RDW 17.7 (H) 11.5 - 14.5 %    Platelets 97 (L) 150 - 450 x10*3/uL   Coagulation Screen   Result Value Ref Range    Protime 16.6 (H) 9.8 - 12.8 seconds    INR 1.5 (H) 0.9 - 1.1    aPTT 40 (H) 27 - 38 seconds   Magnesium   Result Value Ref Range    Magnesium 2.29 1.60 - 2.40 mg/dL   Renal Function Panel   Result Value Ref Range    Glucose 108 (H) 74 - 99 mg/dL    Sodium 138 136 - 145 mmol/L    Potassium 4.7 3.5 - 5.3 mmol/L    Chloride 102 98 - 107 mmol/L    Bicarbonate 23 21 - 32 mmol/L    Anion Gap 18 10 - 20 mmol/L    Urea Nitrogen 65 (H) 6 - 23 mg/dL    Creatinine 2.08 (H) 0.50 - 1.30 mg/dL    eGFR 35 (L) >60 mL/min/1.73m*2    Calcium 8.5 (L) 8.6 - 10.6 mg/dL    Phosphorus 4.1 2.5 - 4.9 mg/dL    Albumin 3.3 (L) 3.4 - 5.0 g/dL   POCT GLUCOSE   Result Value Ref Range    POCT Glucose 112 (H) 74 - 99 mg/dL   POCT GLUCOSE   Result Value Ref Range    POCT Glucose 107 (H) 74 - 99 mg/dL   POCT GLUCOSE   Result Value Ref Range    POCT Glucose 114 (H) 74 - 99 mg/dL   Blood Gas Arterial Full Panel   Result Value Ref Range    POCT pH, Arterial 7.44  (H) 7.38 - 7.42 pH    POCT pCO2, Arterial 30 (L) 38 - 42 mm Hg    POCT pO2, Arterial 111 (H) 85 - 95 mm Hg    POCT SO2, Arterial 100 94 - 100 %    POCT Oxy Hemoglobin, Arterial 96.2 94.0 - 98.0 %    POCT Hematocrit Calculated, Arterial 25.0 (L) 41.0 - 52.0 %    POCT Sodium, Arterial 134 (L) 136 - 145 mmol/L    POCT Potassium, Arterial 4.3 3.5 - 5.3 mmol/L    POCT Chloride, Arterial 106 98 - 107 mmol/L    POCT Ionized Calcium, Arterial 1.09 (L) 1.10 - 1.33 mmol/L    POCT Glucose, Arterial 109 (H) 74 - 99 mg/dL    POCT Lactate, Arterial 1.5 0.4 - 2.0 mmol/L    POCT Base Excess, Arterial -3.2 (L) -2.0 - 3.0 mmol/L    POCT HCO3 Calculated, Arterial 20.4 (L) 22.0 - 26.0 mmol/L    POCT Hemoglobin, Arterial 8.2 (L) 13.5 - 17.5 g/dL    POCT Anion Gap, Arterial 12 10 - 25 mmo/L    Patient Temperature 37.0 degrees Celsius    FiO2 50 %   POCT GLUCOSE   Result Value Ref Range    POCT Glucose 120 (H) 74 - 99 mg/dL   POCT GLUCOSE   Result Value Ref Range    POCT Glucose 121 (H) 74 - 99 mg/dL   Blood Gas Arterial Full Panel   Result Value Ref Range    POCT pH, Arterial 7.44 (H) 7.38 - 7.42 pH    POCT pCO2, Arterial 31 (L) 38 - 42 mm Hg    POCT pO2, Arterial 73 (L) 85 - 95 mm Hg    POCT SO2, Arterial 98 94 - 100 %    POCT Oxy Hemoglobin, Arterial 94.2 94.0 - 98.0 %    POCT Hematocrit Calculated, Arterial 24.0 (L) 41.0 - 52.0 %    POCT Sodium, Arterial 134 (L) 136 - 145 mmol/L    POCT Potassium, Arterial 4.4 3.5 - 5.3 mmol/L    POCT Chloride, Arterial 104 98 - 107 mmol/L    POCT Ionized Calcium, Arterial 1.16 1.10 - 1.33 mmol/L    POCT Glucose, Arterial 123 (H) 74 - 99 mg/dL    POCT Lactate, Arterial 1.4 0.4 - 2.0 mmol/L    POCT Base Excess, Arterial -2.6 (L) -2.0 - 3.0 mmol/L    POCT HCO3 Calculated, Arterial 21.1 (L) 22.0 - 26.0 mmol/L    POCT Hemoglobin, Arterial 8.0 (L) 13.5 - 17.5 g/dL    POCT Anion Gap, Arterial 13 10 - 25 mmo/L    Patient Temperature 37.0 degrees Celsius    FiO2 50 %   CBC   Result Value Ref Range    WBC 5.7  4.4 - 11.3 x10*3/uL    nRBC 0.7 (H) 0.0 - 0.0 /100 WBCs    RBC 2.75 (L) 4.50 - 5.90 x10*6/uL    Hemoglobin 8.0 (L) 13.5 - 17.5 g/dL    Hematocrit 21.8 (L) 41.0 - 52.0 %    MCV 79 (L) 80 - 100 fL    MCH 29.1 26.0 - 34.0 pg    MCHC 36.7 (H) 32.0 - 36.0 g/dL    RDW 18.7 (H) 11.5 - 14.5 %    Platelets 96 (L) 150 - 450 x10*3/uL   Coagulation Screen   Result Value Ref Range    Protime 17.9 (H) 9.8 - 12.8 seconds    INR 1.6 (H) 0.9 - 1.1    aPTT 42 (H) 27 - 38 seconds   Hepatic function panel   Result Value Ref Range    Albumin 3.3 (L) 3.4 - 5.0 g/dL    Bilirubin, Total 21.6 (H) 0.0 - 1.2 mg/dL    Bilirubin, Direct 14.8 (H) 0.0 - 0.3 mg/dL    Alkaline Phosphatase 115 33 - 136 U/L    ALT 6 (L) 10 - 52 U/L    AST 87 (H) 9 - 39 U/L    Total Protein 4.7 (L) 6.4 - 8.2 g/dL   Magnesium   Result Value Ref Range    Magnesium 2.38 1.60 - 2.40 mg/dL   CALCIUM, IONIZED   Result Value Ref Range    POCT Calcium, Ionized 1.13 1.1 - 1.33 mmol/L   Basic Metabolic Panel   Result Value Ref Range    Glucose 126 (H) 74 - 99 mg/dL    Sodium 137 136 - 145 mmol/L    Potassium 4.5 3.5 - 5.3 mmol/L    Chloride 101 98 - 107 mmol/L    Bicarbonate 23 21 - 32 mmol/L    Anion Gap 18 10 - 20 mmol/L    Urea Nitrogen 57 (H) 6 - 23 mg/dL    Creatinine 1.61 (H) 0.50 - 1.30 mg/dL    eGFR 48 (L) >60 mL/min/1.73m*2    Calcium 8.4 (L) 8.6 - 10.6 mg/dL   Phosphorus   Result Value Ref Range    Phosphorus 3.4 2.5 - 4.9 mg/dL   POCT GLUCOSE   Result Value Ref Range    POCT Glucose 124 (H) 74 - 99 mg/dL   POCT GLUCOSE   Result Value Ref Range    POCT Glucose 126 (H) 74 - 99 mg/dL   POCT GLUCOSE   Result Value Ref Range    POCT Glucose 128 (H) 74 - 99 mg/dL   POCT GLUCOSE   Result Value Ref Range    POCT Glucose 132 (H) 74 - 99 mg/dL   Blood Gas Arterial Full Panel   Result Value Ref Range    POCT pH, Arterial 7.45 (H) 7.38 - 7.42 pH    POCT pCO2, Arterial 29 (L) 38 - 42 mm Hg    POCT pO2, Arterial 81 (L) 85 - 95 mm Hg    POCT SO2, Arterial 98 94 - 100 %    POCT Oxy  Hemoglobin, Arterial 95.5 94.0 - 98.0 %    POCT Hematocrit Calculated, Arterial 27.0 (L) 41.0 - 52.0 %    POCT Sodium, Arterial 134 (L) 136 - 145 mmol/L    POCT Potassium, Arterial 4.3 3.5 - 5.3 mmol/L    POCT Chloride, Arterial 106 98 - 107 mmol/L    POCT Ionized Calcium, Arterial 1.12 1.10 - 1.33 mmol/L    POCT Glucose, Arterial 135 (H) 74 - 99 mg/dL    POCT Lactate, Arterial 1.5 0.4 - 2.0 mmol/L    POCT Base Excess, Arterial -3.1 (L) -2.0 - 3.0 mmol/L    POCT HCO3 Calculated, Arterial 20.2 (L) 22.0 - 26.0 mmol/L    POCT Hemoglobin, Arterial 9.1 (L) 13.5 - 17.5 g/dL    POCT Anion Gap, Arterial 12 10 - 25 mmo/L    Patient Temperature 37.0 degrees Celsius    FiO2 50 %   POCT GLUCOSE   Result Value Ref Range    POCT Glucose 133 (H) 74 - 99 mg/dL   POCT GLUCOSE   Result Value Ref Range    POCT Glucose 138 (H) 74 - 99 mg/dL   Blood Gas Arterial Full Panel   Result Value Ref Range    POCT pH, Arterial 7.44 (H) 7.38 - 7.42 pH    POCT pCO2, Arterial 29 (L) 38 - 42 mm Hg    POCT pO2, Arterial 87 85 - 95 mm Hg    POCT SO2, Arterial 99 94 - 100 %    POCT Oxy Hemoglobin, Arterial 95.9 94.0 - 98.0 %    POCT Hematocrit Calculated, Arterial 23.0 (L) 41.0 - 52.0 %    POCT Sodium, Arterial 134 (L) 136 - 145 mmol/L    POCT Potassium, Arterial 4.1 3.5 - 5.3 mmol/L    POCT Chloride, Arterial      POCT Ionized Calcium, Arterial 1.11 1.10 - 1.33 mmol/L    POCT Glucose, Arterial 129 (H) 74 - 99 mg/dL    POCT Lactate, Arterial 1.5 0.4 - 2.0 mmol/L    POCT Base Excess, Arterial -3.9 (L) -2.0 - 3.0 mmol/L    POCT HCO3 Calculated, Arterial 19.7 (L) 22.0 - 26.0 mmol/L    POCT Hemoglobin, Arterial 7.6 (L) 13.5 - 17.5 g/dL    POCT Anion Gap, Arterial      Patient Temperature 37.0 degrees Celsius    FiO2 50 %   CALCIUM, IONIZED   Result Value Ref Range    POCT Calcium, Ionized 1.11 1.1 - 1.33 mmol/L   CBC   Result Value Ref Range    WBC 7.8 4.4 - 11.3 x10*3/uL    nRBC 0.8 (H) 0.0 - 0.0 /100 WBCs    RBC 2.89 (L) 4.50 - 5.90 x10*6/uL     Hemoglobin 8.4 (L) 13.5 - 17.5 g/dL    Hematocrit 22.7 (L) 41.0 - 52.0 %    MCV 79 (L) 80 - 100 fL    MCH 29.1 26.0 - 34.0 pg    MCHC 37.0 (H) 32.0 - 36.0 g/dL    RDW 19.0 (H) 11.5 - 14.5 %    Platelets 104 (L) 150 - 450 x10*3/uL   Coagulation Screen   Result Value Ref Range    Protime 17.5 (H) 9.8 - 12.8 seconds    INR 1.5 (H) 0.9 - 1.1    aPTT 40 (H) 27 - 38 seconds   Magnesium   Result Value Ref Range    Magnesium 2.32 1.60 - 2.40 mg/dL   Renal Function Panel   Result Value Ref Range    Glucose 143 (H) 74 - 99 mg/dL    Sodium 137 136 - 145 mmol/L    Potassium 4.7 3.5 - 5.3 mmol/L    Chloride 101 98 - 107 mmol/L    Bicarbonate 22 21 - 32 mmol/L    Anion Gap 19 10 - 20 mmol/L    Urea Nitrogen 51 (H) 6 - 23 mg/dL    Creatinine 1.70 (H) 0.50 - 1.30 mg/dL    eGFR 45 (L) >60 mL/min/1.73m*2    Calcium 8.5 (L) 8.6 - 10.6 mg/dL    Phosphorus 3.3 2.5 - 4.9 mg/dL    Albumin 3.4 3.4 - 5.0 g/dL   Fibrinogen   Result Value Ref Range    Fibrinogen 231 200 - 400 mg/dL   POCT GLUCOSE   Result Value Ref Range    POCT Glucose 158 (H) 74 - 99 mg/dL       Assessment/Plan   Principal Problem:    Soft tissue sarcoma (Multi)  Active Problems:    Acute kidney injury (nontraumatic) (CMS-HCC)    Pulmonary embolus (Multi)    Anemia    Thrombocytopenia (CMS-HCC)    Current chronic use of systemic steroids    Gastroesophageal reflux disease without esophagitis    Extraskeletal myxoid chondrosarcoma (Multi)    Cardiopulmonary arrest (Multi)    Acute respiratory failure with hypoxia (Multi)    Tracheostomy hemorrhage (Multi)    Postoperative wound breakdown, initial encounter    Postoperative wound breakdown, sequela    Hematoma    Jason Hollins is a 63 y.o. 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 Hawkins and Dr. Smith on 3/5/24.  Post operative  course was uncomplicated and patient was on RNF until 3/20 for prolonged bed rest to avoid hip flexion to maintain flap integrity.  Patient was on prophylactic lovenox while on bedrest.      3/20: Cardiopulmonary arrest s/p CPR with ROSC after TPA, overnight started on heparin, epo, increased pressor requirements, increased swelling at flap site.      3/21: Hemorrhagic shock, MTP. Firm and large right flap site. Return to OR s/p Exploration of right thigh wound, Evacuation of hematoma. Suture ligation of multiple bleeding vessel and several points in gluteal area.      4/1: s/p Trach     4/4: return OR with ENT s/p laryngoscopy, esophagoscopy and bronchoscopy, trach revision. Found extensive clot burden in esophagus and stomach as well as in the lungs. Oozing at thyroid bed cauterized. Trach exchanged to new 6.0 prox XLT elvie. OR findings: blood up glottis and from thyroid bed to airway.     4/9: Patient is medically stable for OR on 4/11/24.  He is appropriately n.p.o. since midnight and has had more DVT prophylaxis held for OR today.     4/18: Patient bleeding into RLE given increased ANDERSON drain output and non-incrementing Hgb despite transfusion. Return to OR for washout.        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 Hawkins and Dr. Smith on 3/5/24 s/p cardiopulmonary arrest with ROSC 3/20. CT head 3/22 without acute process. Weaned off cisatracurium and propofol overnight 3/23-3/24. Ketamine weaned off 3/25 and Fentanyl weaned off 3/26 am. Repeat CT Head 3/26 without acute process. MRI Brain 3/30, negative for cerebral infarction or hemorrhage. Neuro exam - following commands except for RLE, tracking, nodding/shaking head to questions.  - ongoing neuro and pain assessments  - PRN Oxycodone and Tylenol  - PT/OT  -> AROM     CV: History of HTN, HLD. Acute cardiopulmonary arrest 2/2 to possible massive PE vs massive STEMI, received multiple rounds of CPR and one defibrillation.  Sustained ROSC achieved after TPA bolus. On high dose levophed, epinephrine, vaso, angioT2. Plan on 3/21 was for ECMO which was aborted secondary to large hemhorrge at right flap site. Had MTP and taken OR with 5L evacuated. ECHO 3/21: Normal LV, Mod enlarged RV, slight suggestion of a Townsend's sign / RV strain, low normal RV function. ECHO 3/22 with hyperdynamic LV. New onset Afib with RVR overnight 3/22-3/23, dosed with 150mg amio x2, started infusion at 1mg/min thereafter. AT2 weaned off. Amio infusion discontinued 3/25. Lactate downtrending. Vaso and epi weaned off. Remains in NSR. iEpo weaned off 3/27. Repeat TTE 3/29 and 4/2 essentially unchanged. Levo resumed 4/2 evening to continue aggressive fluid removal. Repeat TTE on 4/10 with LVEF 65-70% and RV difficulty to assess. Weaned off levo 4/13. Marginal hypotension this am.  - continuous EKG/abp monitoring  - Goal map range 65-90  - resume levophed infusion for fluid removal with CVVH  - increase midodrine dose back to 20mg q8h  - Continue florinef 0.1mg BID   - Continue stress dose steroids at 50mg q8h   - continue albumin 25% q8h for now     PULM: Arrived to SICU intubated julio c-CPR. Concern for massive PE. Started on iEpo, currently on 0.05. Hypoxic respiratory failure. Severe ARDS. ETT exchanged 3/23. CT Chest 3/26 with interval JOHN consolidative/ground glass opacity. S/p Tracheostomy on 4/1. S/p trach revision on 4/4. CXR with bilateraly pulmonary edema and pleural effusions R>L. Currently on SIMV 50%/500/16/+10/12PS.  - wean vent as tolerated, maintain SpO2 >90%, PaO2 >60  - additional bronchial hygiene as indicated  - Daily CXR  - ABG prn     ENT: Had epistaxis 3/23, completed afrin bid x3 days. S/p trach on 4/1. Bleeding from trach site 4/2 am, packing placed by ENT. Repeat episode of  bloody tracheal secretions 4/3 through this am. Taken back to OR with ENT 4/4 s/p trach revision.  - ENT following     GI: NPO. Shock liver, pancreatitis. Elevated TG. Elevated lactate. Consulted ACS for potential bowel ischemia as cause of persistent lactic acidosis. CT imaging 3/22 with evidence of pancreatitis. No acute surgical indication per ACS. RUQ US 3/22 with thickening of GB wall without cholelithiasis. CT A/P 3/26 negative for acute bleed. LFTs downtrending. Worsening hyperbilirubinemia. Amylase and lipase now WNL. Repeat RUQ US 4/1 with nonspecific gallbladder wall edema and thickening which may be 2/2 generalized fluid overload, mild hepatomegaly with slight nodular contour and c/f steatosis and fibrosis, irregular hypoechoic region adjacent to hepatic veins. US liver with doppler 4/2 revealed hepatomegaly with nodular contour, mildly elevated RI in main and left hepatic arteries. Hyperbilirubinemia.  - Increase TFs to goal 45ml/hr, prostat daily  - discontinue fiber supplement  - will start colace and go slow with bowel regimen  - PPI BID switched to daily for GI prophylaxis  - Trend LFTs daily     : No history of renal disease. Baseline creatinine 0.6. Anuric RUTH. Elevated CK, downtrending. Metabolic acidosis, total body fluid overload, hyperkalemia. Started on CVVH 3/21, stopped bicarb infusion 3/22. Mar[place] removed 3/24. Hyponatremia resolved. Stopped CVVH 4/2.  Has been tolerating iHD. Net +ve 1467. CVVH restarted on 4/18 given hemorrhage and takeback to OR. Net positive 1.1L for past 24hrs.  - Nephrology following, appreciate recs  - continue CVVH and attempt fluid removal today at 50ml/hr  - RFP BID and PRN  - Replete electrolytes judiciously     HEME: Patient was on ppx lovenox for DVT prophylaxis prior to cardiopulmonary arrest. Concern for massive PE patient received bolus of TPA during CPR. Started on heparin infusion overnight given concern for PE. Hemorrhagic shock 3/21, MTP and back to OR  s/p Exploration of right thigh woundEvacuation of hematoma. Suture ligation of multiple bleeding vessel and several points in gluteal area. Hematology consulted 3/22 to r/o HLH. Transfused 1 RBC overnight 3/22-3/23. Thrombocytopenia, coagulapathy, hypofibrinogenemia. LE Duplex 3/25 +acute occlusive R soleal DVT. Received 2u PRBC and 1u FFP on 3/26. Heparin infusion discontinued 3/26 to r/o HIT and transitioned to bival. PF4 negative, resumed heparin infuision 3/27. Elevated IL2R and decreased NK function. C/f HLH (low suspicion), empirically treated with decadron (3/28-3/31). Thrombocytopenia on 3/30 to 18k, received 5pk, did not increment. Heparin held. Thrombocytopenia likely 2/2 to consumptive process, hematology following. Received IVIG and 5pk plts 3/31. Pancytopenia persists, improving thrombocytopenia. On 4/18 increased bleeding noted into RLE. Blood transfusion ongoing and Hep gtt held.  - CBC and coags BID and PRN  - No anticoagulation at this time  - Vascular Medicine following, appreciate consideration of IVC filter  - will repeat BUE and BLE venous duplexes  - Notify Plastics if ANDERSON drain output exceeds >300 ml/hr  - close surveillance of RLE and drains  - maintain active T&S (4/18)     ENDO: History of DM2. A1c 7.5%. TSH WNL 1/2024. Previously hyperglycemic, but now recently hypoglycemic requiring D50. Glucose overnight in good range ~120-130, now 158 this am, likely trending up since tube feed resumption.  - continue to hold lantus  - resume SSI with lower scale #1 -> will monitor closely and may need to increase  - q4h accuchecks     ID: Leukocytosis resolved, now with leukopenia. On broad antimicrobial therapy. MRSA screen + 2/28/24. Pan cultures sent 3/22. Sputum NTF, +MRSA screen (completed 5 day course mupirocin), UCx and BCx negative. Completed 7 day course vanc/zosyn 3/27. Febrile to 39.1 4/3, resent blood cultures and BAL and started vanco and zosyn. Switched zosyn to reynaldo, kept on vanco, added  john. Blood cultures and sputum with Klebsiella. Repeat blood cultures sent 4/4 (neg final). Wound culture 4/11 +MDRO Klebsiella aerogenes (S meropenem). Urine culture 4/12 negative. MRSA negative 4/18.  - ID following, appreciate recs  - temp q4h, wbc daily  - continue meropenem (stop date 5/2/24 per ID)  - ongoing monitoring for s/s infection     Lines:  - LIJ trialysis (4/18, SICU)  - left radial arterial line (4/5, SICU)  - PIV x2  - Pending IR for tunneled line - will re-engage when patient clinically stable.     Dispo: Continue ICU care. Patient seen and discussed with ICU attending Dr. Ness.      PHILLIP Alejo-CNP   SICU phone [phone]    Critical Care time: 48 minutes

## 2024-04-20 NOTE — CARE PLAN
The patient's goals for the shift include  patient will remain comfortable by end of shift.    The clinical goals for the shift include patient will remain HD stable by end of shift.    Over the shift, the patient did not make progress toward the following goals. Barriers to progression include CRRT, bleeding risk. Recommendations to address these barriers include monitor VS, monitor drain output, hourly I/O, trend labs.

## 2024-04-20 NOTE — PROGRESS NOTES
Jason Hollins is a 63 y.o. male on day 46 of admission presenting with Soft tissue sarcoma (Multi).      Subjective   Seen and examined. No o/n events       Objective   Vitals 24HR  Heart Rate:  [63-98]   Temp:  [30.4 °C (86.7 °F)-36.1 °C (97 °F)]   Resp:  [0-29]   BP: ()/()   Weight:  [135 kg (298 lb 1 oz)]   SpO2:  [94 %-100 %]     Intake/Output last 3 Shifts:    Intake/Output Summary (Last 24 hours) at 4/20/2024 1512  Last data filed at 4/20/2024 1500  Gross per 24 hour   Intake 1194.85 ml   Output 567 ml   Net 627.85 ml   Urine  o/p 47 ml    Physical Exam  General appearance: intubated, trached  Neck: trach  Heart: RRR  Lungs: FiO2 40% on vent via trach  : no Marino  Extremities: 1+ b/l leg edema; RLE drains - bloody  Neuro: alert, follows commands    Results from last 72 hours   Lab Units 04/20/24  1356 04/20/24  0605 04/20/24  0005 04/19/24  1800   SODIUM mmol/L  --  137 137 138   POTASSIUM mmol/L  --  4.7 4.5 4.7   CHLORIDE mmol/L  --  101 101 102   CO2 mmol/L  --  22 23 23   BUN mg/dL  --  51* 57* 65*   PHOSPHORUS mg/dL  --  3.3 3.4 4.1   HEMOGLOBIN g/dL 8.5* 8.4* 8.0* 8.3*      Scheduled medications  albumin human, 25 g, intravenous, q8h  docusate sodium, 100 mg, nasogastric tube, BID  [START ON 4/21/2024] esomeprazole, 40 mg, nasogastric tube, Daily  fludrocortisone, 0.1 mg, nasogastric tube, q12h  hydrocortisone sodium succinate, 50 mg, intravenous, q8h  [Held by provider] insulin glargine, 12 Units, subcutaneous, q12h  insulin lispro, 0-5 Units, subcutaneous, q4h  meropenem, 1 g, intravenous, q12h  midodrine, 20 mg, orogastric tube, q8h  oxygen, , inhalation, Continuous - Inhalation      Continuous medications  lactated Ringer's, 5 mL/hr, Last Rate: 5 mL/hr (04/20/24 0400)  norepinephrine, 0.01-0.05 mcg/kg/min (Dosing Weight), Last Rate: 0.02 mcg/kg/min (04/20/24 1349)  PrismaSol 4/2.5, 3,000 mL/hr, Last Rate: 3,000 mL/hr (04/20/24 0314)      PRN medications  PRN medications: acetaminophen,  alteplase, alteplase, calcium gluconate, calcium gluconate, dextrose, glucagon, heparin, heparin, labetaloL, magnesium sulfate, magnesium sulfate, midodrine, oxyCODONE, oxyCODONE, sodium chloride      Assessment/Plan     Jason Hollins is a 63 y.o. 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 Hawkins and Dr. Smith on 3/5/24. On 3/20, he developed massive PE and was given TPA, taken to OR in setting of increased swelling at flap site- hematoma for exploration and evacuation. Nephrology following for RUTH.    # Oliguric RUTH due to hemodynamic injury  On CVVH for clearance and volume control  No issues reported  Prescription reviewed, 4 K, continue same   Ca, Mg and PO4 replacement per protocol  Avoid nephrotoxins, dose to eGFR <15 ml/min  Will follow

## 2024-04-20 NOTE — PROGRESS NOTES
"Jason Hollins is a 63 y.o. male on day 46 of admission presenting with Soft tissue sarcoma (Multi).    Subjective   Laying in bed on ventilator, in no acute distress. Gives thumbs up when asked about how he is doing.        Objective     Physical Exam  Laying in bed, in no acute distress   On ventilator via trach   RLE with incision firm but not worse, drains with minimal serosanguinous output   Peripheral edema throughout       Last Recorded Vitals  Blood pressure 109/58, pulse 98, temperature 36.1 °C (97 °F), temperature source Temporal, resp. rate (!) 0, height 1.778 m (5' 10\"), weight 135 kg (298 lb 1 oz), SpO2 94%.  Intake/Output last 3 Shifts:  I/O last 3 completed shifts:  In: 3577 (26.5 mL/kg) [I.V.:225 (1.7 mL/kg); Blood:2037; NG/GT:915; IV Piggyback:400]  Out: 820 (6.1 mL/kg) [Urine:145 (0 mL/kg/hr); Drains:725]  Weight: 135.2 kg     Assessment/Plan   Principal Problem:    Soft tissue sarcoma (Multi)  Active Problems:    Acute kidney injury (nontraumatic) (CMS-HCC)    Pulmonary embolus (Multi)    Anemia    Thrombocytopenia (CMS-HCC)    Current chronic use of systemic steroids    Gastroesophageal reflux disease without esophagitis    Extraskeletal myxoid chondrosarcoma (Multi)    Cardiopulmonary arrest (Multi)    Acute respiratory failure with hypoxia (Multi)    Tracheostomy hemorrhage (Multi)    Postoperative wound breakdown, initial encounter    Postoperative wound breakdown, sequela    Hematoma    Briefly, 63 year old male with history of myxoid chondrosarcoma who underwent wide resection with sciatic nerve neurolysis with right gluteal reconstruction and pedicle ALT, vastus lateralis flap, pedicle gluteal and flap with Dr. Hawkins and Dr. Smith on 3/5/24. Post-operative course complicated by cardiopulmonary arrest on 3/20 with suspected PE s/p TPA and initiation of heparin infusion complicated by hemorrhagic shock requiring massive transfusion necessitating re-exploration of the right lower extremity. " Course also complicated by need for tracheostomy and exchange. Patient started to have increased output from ANDERSON drains 4/18 at surgical site without incrementing with transfusions, therefore heparin infusion held and Plastic Surgery took back to OR for exploration with placement of wound vac.      Neuro: Awake, alert, interactive. Continue pain control. Passive range of motion at minimum with PT, goal to edge of bed when able. Delirium precautions.   CV: Hemodynamically stable after return from OR. Continue midodrine, but will opt for starting low dose norepinephrine today to facilitate volume removal via CRRT in setting of borderline hypotension. Stress dose steroids and florinef, continue.   Pulm: Reportedly difficulty with oxygenation intraoperatively 4/18. CXR reviewed, volume overload. Tolerating 50% FiO2. Tolerating SIMV via trach, wean support as able. CPAP trial today if able.  : RUTH necessitating CRRT, tolerated session of HD 4/15 prior to line holiday and was showing some signs of renal recovery prior to acute blood loss anemia that required return to OR. Placed back on CRRT upon arrival from OR. Goal to pull fluid for negative balance today. Optimize electrolytes. Bladder scan daily to assess for renal recovery.   GI: Will continue tube feeds and resume bowel regimen today. Continue PPI. Trend LFTs.   Heme: Acute blood loss anemia s/p return to OR, stable. No current indication for transfusion, will monitor closely today. Maintain product on hold. Appreciate Vascular Medicine input--Will need to discuss plan for anticoagulation strategy given failing anticoagulation and may need to discuss IVC filter.   ID: ID following, appreciate input. Will continue meropenem (tentative stop date now 5/2).   Endo: Continue steroids, will assess for wean again tomorrow. Monitor glucose levels with tube feeds resuming and assess for treatment as needed.      Dispo: Continue SICU care.      This critically ill patient  continues to be at risk for clinically significant deterioration / failure due to the above mentioned dysfunctional, unstable organ systems.  I have personally identified and managed all complex critical care issues to prevent aforementioned clinical deterioration.  Critical care time is spent at bedside and/or the immediate area and has included, but is not limited to, the review of diagnostic tests, labs, radiographs, serial assessments of hemodynamics, respiratory status, ventilatory management, review of consult team recommendations, and family updates.  Time spent in procedures and teaching are reported separately. Critical Care Time: 34 minutes.    I spent 34 minutes in the professional and overall care of this patient.      Verito Ness, DO

## 2024-04-21 ENCOUNTER — APPOINTMENT (OUTPATIENT)
Dept: RADIOLOGY | Facility: HOSPITAL | Age: 64
DRG: 003 | End: 2024-04-21
Payer: COMMERCIAL

## 2024-04-21 LAB
ALBUMIN SERPL BCP-MCNC: 3.8 G/DL (ref 3.4–5)
ALBUMIN SERPL BCP-MCNC: 3.9 G/DL (ref 3.4–5)
ALP SERPL-CCNC: 143 U/L (ref 33–136)
ALP SERPL-CCNC: 148 U/L (ref 33–136)
ALT SERPL W P-5'-P-CCNC: 6 U/L (ref 10–52)
ALT SERPL W P-5'-P-CCNC: 7 U/L (ref 10–52)
ANION GAP BLDA CALCULATED.4IONS-SCNC: ABNORMAL MMOL/L
ANION GAP SERPL CALC-SCNC: 18 MMOL/L (ref 10–20)
ANION GAP SERPL CALC-SCNC: 20 MMOL/L (ref 10–20)
APTT PPP: 43 SECONDS (ref 27–38)
APTT PPP: 43 SECONDS (ref 27–38)
AST SERPL W P-5'-P-CCNC: 65 U/L (ref 9–39)
AST SERPL W P-5'-P-CCNC: 73 U/L (ref 9–39)
BASE EXCESS BLDA CALC-SCNC: -0.1 MMOL/L (ref -2–3)
BASO STIPL BLD QL SMEAR: PRESENT
BASOPHILS # BLD MANUAL: 0 X10*3/UL (ref 0–0.1)
BASOPHILS NFR BLD MANUAL: 0 %
BILIRUB DIRECT SERPL-MCNC: 15.6 MG/DL (ref 0–0.3)
BILIRUB DIRECT SERPL-MCNC: 15.9 MG/DL (ref 0–0.3)
BILIRUB SERPL-MCNC: 22.6 MG/DL (ref 0–1.2)
BILIRUB SERPL-MCNC: 22.9 MG/DL (ref 0–1.2)
BLOOD EXPIRATION DATE: NORMAL
BODY TEMPERATURE: 37 DEGREES CELSIUS
BUN SERPL-MCNC: 40 MG/DL (ref 6–23)
BUN SERPL-MCNC: 41 MG/DL (ref 6–23)
CA-I BLD-SCNC: 1.04 MMOL/L (ref 1.1–1.33)
CA-I BLDA-SCNC: 1.17 MMOL/L (ref 1.1–1.33)
CALCIUM SERPL-MCNC: 8.4 MG/DL (ref 8.6–10.6)
CALCIUM SERPL-MCNC: 8.9 MG/DL (ref 8.6–10.6)
CHLORIDE BLDA-SCNC: ABNORMAL MMOL/L
CHLORIDE SERPL-SCNC: 98 MMOL/L (ref 98–107)
CHLORIDE SERPL-SCNC: 99 MMOL/L (ref 98–107)
CO2 SERPL-SCNC: 21 MMOL/L (ref 21–32)
CO2 SERPL-SCNC: 23 MMOL/L (ref 21–32)
CREAT SERPL-MCNC: 1.17 MG/DL (ref 0.5–1.3)
CREAT SERPL-MCNC: 1.28 MG/DL (ref 0.5–1.3)
DISPENSE STATUS: NORMAL
EGFRCR SERPLBLD CKD-EPI 2021: 63 ML/MIN/1.73M*2
EGFRCR SERPLBLD CKD-EPI 2021: 70 ML/MIN/1.73M*2
EOSINOPHIL # BLD MANUAL: 0 X10*3/UL (ref 0–0.7)
EOSINOPHIL NFR BLD MANUAL: 0 %
ERYTHROCYTE [DISTWIDTH] IN BLOOD BY AUTOMATED COUNT: 19.8 % (ref 11.5–14.5)
ERYTHROCYTE [DISTWIDTH] IN BLOOD BY AUTOMATED COUNT: 20.5 % (ref 11.5–14.5)
ERYTHROCYTE [DISTWIDTH] IN BLOOD BY AUTOMATED COUNT: 20.5 % (ref 11.5–14.5)
ERYTHROCYTE [DISTWIDTH] IN BLOOD BY AUTOMATED COUNT: 21.2 % (ref 11.5–14.5)
GLUCOSE BLD MANUAL STRIP-MCNC: 198 MG/DL (ref 74–99)
GLUCOSE BLD MANUAL STRIP-MCNC: 227 MG/DL (ref 74–99)
GLUCOSE BLD MANUAL STRIP-MCNC: 247 MG/DL (ref 74–99)
GLUCOSE BLD MANUAL STRIP-MCNC: 255 MG/DL (ref 74–99)
GLUCOSE BLD MANUAL STRIP-MCNC: 269 MG/DL (ref 74–99)
GLUCOSE BLD MANUAL STRIP-MCNC: 286 MG/DL (ref 74–99)
GLUCOSE BLDA-MCNC: 242 MG/DL (ref 74–99)
GLUCOSE SERPL-MCNC: 222 MG/DL (ref 74–99)
GLUCOSE SERPL-MCNC: 235 MG/DL (ref 74–99)
HCO3 BLDA-SCNC: 23.5 MMOL/L (ref 22–26)
HCT VFR BLD AUTO: 22.9 % (ref 41–52)
HCT VFR BLD AUTO: 23.2 % (ref 41–52)
HCT VFR BLD AUTO: 23.2 % (ref 41–52)
HCT VFR BLD AUTO: 24.4 % (ref 41–52)
HCT VFR BLD EST: 25 % (ref 41–52)
HGB BLD-MCNC: 8 G/DL (ref 13.5–17.5)
HGB BLD-MCNC: 8 G/DL (ref 13.5–17.5)
HGB BLD-MCNC: 8.2 G/DL (ref 13.5–17.5)
HGB BLD-MCNC: 8.4 G/DL (ref 13.5–17.5)
HGB BLDA-MCNC: 8.4 G/DL (ref 13.5–17.5)
HYPOCHROMIA BLD QL SMEAR: ABNORMAL
IMM GRANULOCYTES # BLD AUTO: 0.39 X10*3/UL (ref 0–0.7)
IMM GRANULOCYTES NFR BLD AUTO: 3 % (ref 0–0.9)
INHALED O2 CONCENTRATION: 50 %
INR PPP: 1.7 (ref 0.9–1.1)
INR PPP: 1.9 (ref 0.9–1.1)
LACTATE BLDA-SCNC: 2.9 MMOL/L (ref 0.4–2)
LYMPHOCYTES # BLD MANUAL: 0.32 X10*3/UL (ref 1.2–4.8)
LYMPHOCYTES NFR BLD MANUAL: 2.5 %
MAGNESIUM SERPL-MCNC: 2.63 MG/DL (ref 1.6–2.4)
MAGNESIUM SERPL-MCNC: 2.64 MG/DL (ref 1.6–2.4)
MCH RBC QN AUTO: 28.5 PG (ref 26–34)
MCH RBC QN AUTO: 28.5 PG (ref 26–34)
MCH RBC QN AUTO: 28.9 PG (ref 26–34)
MCH RBC QN AUTO: 29.4 PG (ref 26–34)
MCHC RBC AUTO-ENTMCNC: 33.6 G/DL (ref 32–36)
MCHC RBC AUTO-ENTMCNC: 34.5 G/DL (ref 32–36)
MCHC RBC AUTO-ENTMCNC: 34.5 G/DL (ref 32–36)
MCHC RBC AUTO-ENTMCNC: 36.7 G/DL (ref 32–36)
MCV RBC AUTO: 80 FL (ref 80–100)
MCV RBC AUTO: 83 FL (ref 80–100)
MCV RBC AUTO: 83 FL (ref 80–100)
MCV RBC AUTO: 86 FL (ref 80–100)
MONOCYTES # BLD MANUAL: 0.32 X10*3/UL (ref 0.1–1)
MONOCYTES NFR BLD MANUAL: 2.5 %
NEUTROPHILS # BLD MANUAL: 12.25 X10*3/UL (ref 1.2–7.7)
NEUTS BAND # BLD MANUAL: 1.07 X10*3/UL (ref 0–0.7)
NEUTS BAND NFR BLD MANUAL: 8.3 %
NEUTS SEG # BLD MANUAL: 11.18 X10*3/UL (ref 1.2–7)
NEUTS SEG NFR BLD MANUAL: 86.7 %
NRBC BLD-RTO: 0.2 /100 WBCS (ref 0–0)
NRBC BLD-RTO: 1.9 /100 WBCS (ref 0–0)
NRBC BLD-RTO: 1.9 /100 WBCS (ref 0–0)
NRBC BLD-RTO: 2.4 /100 WBCS (ref 0–0)
OXYHGB MFR BLDA: 93.9 % (ref 94–98)
PCO2 BLDA: 33 MM HG (ref 38–42)
PH BLDA: 7.46 PH (ref 7.38–7.42)
PHOSPHATE SERPL-MCNC: 2 MG/DL (ref 2.5–4.9)
PHOSPHATE SERPL-MCNC: 2.3 MG/DL (ref 2.5–4.9)
PLATELET # BLD AUTO: 147 X10*3/UL (ref 150–450)
PLATELET # BLD AUTO: 164 X10*3/UL (ref 150–450)
PLATELET # BLD AUTO: 164 X10*3/UL (ref 150–450)
PLATELET # BLD AUTO: 169 X10*3/UL (ref 150–450)
PMV BLD AUTO: 12.9 FL (ref 7.5–11.5)
PO2 BLDA: 71 MM HG (ref 85–95)
POLYCHROMASIA BLD QL SMEAR: ABNORMAL
POTASSIUM BLDA-SCNC: 4.9 MMOL/L (ref 3.5–5.3)
POTASSIUM SERPL-SCNC: 4.7 MMOL/L (ref 3.5–5.3)
POTASSIUM SERPL-SCNC: 4.8 MMOL/L (ref 3.5–5.3)
PRODUCT BLOOD TYPE: 5100
PRODUCT CODE: NORMAL
PROT SERPL-MCNC: 5.3 G/DL (ref 6.4–8.2)
PROT SERPL-MCNC: 5.9 G/DL (ref 6.4–8.2)
PROTHROMBIN TIME: 19.4 SECONDS (ref 9.8–12.8)
PROTHROMBIN TIME: 21.1 SECONDS (ref 9.8–12.8)
RBC # BLD AUTO: 2.81 X10*6/UL (ref 4.5–5.9)
RBC # BLD AUTO: 2.81 X10*6/UL (ref 4.5–5.9)
RBC # BLD AUTO: 2.84 X10*6/UL (ref 4.5–5.9)
RBC # BLD AUTO: 2.86 X10*6/UL (ref 4.5–5.9)
RBC MORPH BLD: ABNORMAL
SAO2 % BLDA: 97 % (ref 94–100)
SODIUM BLDA-SCNC: 133 MMOL/L (ref 136–145)
SODIUM SERPL-SCNC: 134 MMOL/L (ref 136–145)
SODIUM SERPL-SCNC: 135 MMOL/L (ref 136–145)
STOMATOCYTES BLD QL SMEAR: ABNORMAL
TARGETS BLD QL SMEAR: ABNORMAL
TOTAL CELLS COUNTED BLD: 120
UNIT ABO: NORMAL
UNIT NUMBER: NORMAL
UNIT RH: NORMAL
UNIT VOLUME: 319
UNIT VOLUME: 347
UNIT VOLUME: 348
WBC # BLD AUTO: 10.4 X10*3/UL (ref 4.4–11.3)
WBC # BLD AUTO: 12.6 X10*3/UL (ref 4.4–11.3)
WBC # BLD AUTO: 12.9 X10*3/UL (ref 4.4–11.3)
WBC # BLD AUTO: 12.9 X10*3/UL (ref 4.4–11.3)

## 2024-04-21 PROCEDURE — 80048 BASIC METABOLIC PNL TOTAL CA: CPT | Performed by: STUDENT IN AN ORGANIZED HEALTH CARE EDUCATION/TRAINING PROGRAM

## 2024-04-21 PROCEDURE — 2500000004 HC RX 250 GENERAL PHARMACY W/ HCPCS (ALT 636 FOR OP/ED): Performed by: STUDENT IN AN ORGANIZED HEALTH CARE EDUCATION/TRAINING PROGRAM

## 2024-04-21 PROCEDURE — A4217 STERILE WATER/SALINE, 500 ML: HCPCS | Performed by: NURSE PRACTITIONER

## 2024-04-21 PROCEDURE — 82330 ASSAY OF CALCIUM: CPT | Performed by: STUDENT IN AN ORGANIZED HEALTH CARE EDUCATION/TRAINING PROGRAM

## 2024-04-21 PROCEDURE — 2500000004 HC RX 250 GENERAL PHARMACY W/ HCPCS (ALT 636 FOR OP/ED): Performed by: NURSE PRACTITIONER

## 2024-04-21 PROCEDURE — 2500000002 HC RX 250 W HCPCS SELF ADMINISTERED DRUGS (ALT 637 FOR MEDICARE OP, ALT 636 FOR OP/ED): Performed by: NURSE PRACTITIONER

## 2024-04-21 PROCEDURE — P9047 ALBUMIN (HUMAN), 25%, 50ML: HCPCS | Mod: JZ | Performed by: NURSE PRACTITIONER

## 2024-04-21 PROCEDURE — P9047 ALBUMIN (HUMAN), 25%, 50ML: HCPCS | Mod: JZ | Performed by: STUDENT IN AN ORGANIZED HEALTH CARE EDUCATION/TRAINING PROGRAM

## 2024-04-21 PROCEDURE — P9040 RBC LEUKOREDUCED IRRADIATED: HCPCS

## 2024-04-21 PROCEDURE — 85027 COMPLETE CBC AUTOMATED: CPT | Performed by: NURSE PRACTITIONER

## 2024-04-21 PROCEDURE — 2500000001 HC RX 250 WO HCPCS SELF ADMINISTERED DRUGS (ALT 637 FOR MEDICARE OP): Performed by: STUDENT IN AN ORGANIZED HEALTH CARE EDUCATION/TRAINING PROGRAM

## 2024-04-21 PROCEDURE — 2500000001 HC RX 250 WO HCPCS SELF ADMINISTERED DRUGS (ALT 637 FOR MEDICARE OP)

## 2024-04-21 PROCEDURE — 99024 POSTOP FOLLOW-UP VISIT: CPT | Performed by: PHYSICIAN ASSISTANT

## 2024-04-21 PROCEDURE — 99233 SBSQ HOSP IP/OBS HIGH 50: CPT | Performed by: NURSE PRACTITIONER

## 2024-04-21 PROCEDURE — 90945 DIALYSIS ONE EVALUATION: CPT | Performed by: INTERNAL MEDICINE

## 2024-04-21 PROCEDURE — 76937 US GUIDE VASCULAR ACCESS: CPT

## 2024-04-21 PROCEDURE — 37799 UNLISTED PX VASCULAR SURGERY: CPT | Performed by: STUDENT IN AN ORGANIZED HEALTH CARE EDUCATION/TRAINING PROGRAM

## 2024-04-21 PROCEDURE — 84132 ASSAY OF SERUM POTASSIUM: CPT | Performed by: STUDENT IN AN ORGANIZED HEALTH CARE EDUCATION/TRAINING PROGRAM

## 2024-04-21 PROCEDURE — 36430 TRANSFUSION BLD/BLD COMPNT: CPT

## 2024-04-21 PROCEDURE — 71045 X-RAY EXAM CHEST 1 VIEW: CPT | Performed by: RADIOLOGY

## 2024-04-21 PROCEDURE — 90937 HEMODIALYSIS REPEATED EVAL: CPT

## 2024-04-21 PROCEDURE — 2500000002 HC RX 250 W HCPCS SELF ADMINISTERED DRUGS (ALT 637 FOR MEDICARE OP, ALT 636 FOR OP/ED): Performed by: STUDENT IN AN ORGANIZED HEALTH CARE EDUCATION/TRAINING PROGRAM

## 2024-04-21 PROCEDURE — 70450 CT HEAD/BRAIN W/O DYE: CPT

## 2024-04-21 PROCEDURE — 87075 CULTR BACTERIA EXCEPT BLOOD: CPT | Performed by: NURSE PRACTITIONER

## 2024-04-21 PROCEDURE — 99291 CRITICAL CARE FIRST HOUR: CPT | Performed by: NURSE PRACTITIONER

## 2024-04-21 PROCEDURE — 71260 CT THORAX DX C+: CPT | Performed by: RADIOLOGY

## 2024-04-21 PROCEDURE — 2500000005 HC RX 250 GENERAL PHARMACY W/O HCPCS: Performed by: NURSE PRACTITIONER

## 2024-04-21 PROCEDURE — 84075 ASSAY ALKALINE PHOSPHATASE: CPT | Performed by: STUDENT IN AN ORGANIZED HEALTH CARE EDUCATION/TRAINING PROGRAM

## 2024-04-21 PROCEDURE — 99291 CRITICAL CARE FIRST HOUR: CPT | Performed by: STUDENT IN AN ORGANIZED HEALTH CARE EDUCATION/TRAINING PROGRAM

## 2024-04-21 PROCEDURE — 99233 SBSQ HOSP IP/OBS HIGH 50: CPT | Performed by: INTERNAL MEDICINE

## 2024-04-21 PROCEDURE — 2500000002 HC RX 250 W HCPCS SELF ADMINISTERED DRUGS (ALT 637 FOR MEDICARE OP, ALT 636 FOR OP/ED)

## 2024-04-21 PROCEDURE — 36415 COLL VENOUS BLD VENIPUNCTURE: CPT | Performed by: NURSE PRACTITIONER

## 2024-04-21 PROCEDURE — 84100 ASSAY OF PHOSPHORUS: CPT | Performed by: STUDENT IN AN ORGANIZED HEALTH CARE EDUCATION/TRAINING PROGRAM

## 2024-04-21 PROCEDURE — 2500000005 HC RX 250 GENERAL PHARMACY W/O HCPCS

## 2024-04-21 PROCEDURE — 85610 PROTHROMBIN TIME: CPT | Performed by: STUDENT IN AN ORGANIZED HEALTH CARE EDUCATION/TRAINING PROGRAM

## 2024-04-21 PROCEDURE — 82248 BILIRUBIN DIRECT: CPT | Performed by: STUDENT IN AN ORGANIZED HEALTH CARE EDUCATION/TRAINING PROGRAM

## 2024-04-21 PROCEDURE — 87040 BLOOD CULTURE FOR BACTERIA: CPT | Performed by: NURSE PRACTITIONER

## 2024-04-21 PROCEDURE — 85007 BL SMEAR W/DIFF WBC COUNT: CPT | Performed by: NURSE PRACTITIONER

## 2024-04-21 PROCEDURE — 74177 CT ABD & PELVIS W/CONTRAST: CPT

## 2024-04-21 PROCEDURE — 85027 COMPLETE CBC AUTOMATED: CPT | Performed by: STUDENT IN AN ORGANIZED HEALTH CARE EDUCATION/TRAINING PROGRAM

## 2024-04-21 PROCEDURE — 74177 CT ABD & PELVIS W/CONTRAST: CPT | Performed by: RADIOLOGY

## 2024-04-21 PROCEDURE — 2550000001 HC RX 255 CONTRASTS

## 2024-04-21 PROCEDURE — 2020000001 HC ICU ROOM DAILY

## 2024-04-21 PROCEDURE — 2500000004 HC RX 250 GENERAL PHARMACY W/ HCPCS (ALT 636 FOR OP/ED): Mod: JZ | Performed by: STUDENT IN AN ORGANIZED HEALTH CARE EDUCATION/TRAINING PROGRAM

## 2024-04-21 PROCEDURE — 82947 ASSAY GLUCOSE BLOOD QUANT: CPT

## 2024-04-21 PROCEDURE — 70450 CT HEAD/BRAIN W/O DYE: CPT | Performed by: RADIOLOGY

## 2024-04-21 PROCEDURE — 71045 X-RAY EXAM CHEST 1 VIEW: CPT

## 2024-04-21 PROCEDURE — 83735 ASSAY OF MAGNESIUM: CPT | Performed by: STUDENT IN AN ORGANIZED HEALTH CARE EDUCATION/TRAINING PROGRAM

## 2024-04-21 RX ORDER — VANCOMYCIN HYDROCHLORIDE 1 G/200ML
7.5 INJECTION, SOLUTION INTRAVENOUS EVERY 12 HOURS
Status: DISCONTINUED | OUTPATIENT
Start: 2024-04-22 | End: 2024-04-22

## 2024-04-21 RX ORDER — VANCOMYCIN HYDROCHLORIDE 1 G/20ML
INJECTION, POWDER, LYOPHILIZED, FOR SOLUTION INTRAVENOUS DAILY PRN
Status: DISCONTINUED | OUTPATIENT
Start: 2024-04-21 | End: 2024-04-23

## 2024-04-21 RX ORDER — ALBUMIN HUMAN 250 G/1000ML
25 SOLUTION INTRAVENOUS EVERY 6 HOURS
Status: DISCONTINUED | OUTPATIENT
Start: 2024-04-21 | End: 2024-04-21

## 2024-04-21 RX ORDER — INSULIN LISPRO 100 [IU]/ML
0-5 INJECTION, SOLUTION INTRAVENOUS; SUBCUTANEOUS
Status: DISCONTINUED | OUTPATIENT
Start: 2024-04-21 | End: 2024-04-21

## 2024-04-21 RX ORDER — CALCIUM GLUCONATE 98 MG/ML
1 INJECTION, SOLUTION INTRAVENOUS ONCE
Status: DISCONTINUED | OUTPATIENT
Start: 2024-04-21 | End: 2024-04-21

## 2024-04-21 RX ORDER — HYDROMORPHONE HYDROCHLORIDE 1 MG/ML
0.2 INJECTION, SOLUTION INTRAMUSCULAR; INTRAVENOUS; SUBCUTANEOUS EVERY 4 HOURS PRN
Status: DISCONTINUED | OUTPATIENT
Start: 2024-04-21 | End: 2024-04-21

## 2024-04-21 RX ORDER — HEPARIN SODIUM 5000 [USP'U]/ML
7500 INJECTION, SOLUTION INTRAVENOUS; SUBCUTANEOUS EVERY 8 HOURS SCHEDULED
Status: DISCONTINUED | OUTPATIENT
Start: 2024-04-21 | End: 2024-04-22

## 2024-04-21 RX ORDER — INSULIN LISPRO 100 [IU]/ML
0-10 INJECTION, SOLUTION INTRAVENOUS; SUBCUTANEOUS
Status: DISCONTINUED | OUTPATIENT
Start: 2024-04-21 | End: 2024-04-21

## 2024-04-21 RX ORDER — HYDROMORPHONE HYDROCHLORIDE 1 MG/ML
0.2 INJECTION, SOLUTION INTRAMUSCULAR; INTRAVENOUS; SUBCUTANEOUS EVERY 4 HOURS PRN
Status: DISCONTINUED | OUTPATIENT
Start: 2024-04-21 | End: 2024-04-26

## 2024-04-21 RX ORDER — INSULIN LISPRO 100 [IU]/ML
0-15 INJECTION, SOLUTION INTRAVENOUS; SUBCUTANEOUS EVERY 4 HOURS
Status: DISCONTINUED | OUTPATIENT
Start: 2024-04-21 | End: 2024-04-22

## 2024-04-21 RX ORDER — HYDROMORPHONE HYDROCHLORIDE 1 MG/ML
0.4 INJECTION, SOLUTION INTRAMUSCULAR; INTRAVENOUS; SUBCUTANEOUS EVERY 4 HOURS PRN
Status: DISCONTINUED | OUTPATIENT
Start: 2024-04-21 | End: 2024-04-26

## 2024-04-21 RX ORDER — POLYETHYLENE GLYCOL 3350 17 G/17G
17 POWDER, FOR SOLUTION ORAL ONCE
Status: COMPLETED | OUTPATIENT
Start: 2024-04-21 | End: 2024-04-21

## 2024-04-21 RX ORDER — ALBUMIN HUMAN 250 G/1000ML
12.5 SOLUTION INTRAVENOUS EVERY 6 HOURS
Status: COMPLETED | OUTPATIENT
Start: 2024-04-21 | End: 2024-04-22

## 2024-04-21 RX ORDER — POLYETHYLENE GLYCOL 3350 17 G/17G
17 POWDER, FOR SOLUTION ORAL DAILY
Status: DISCONTINUED | OUTPATIENT
Start: 2024-04-21 | End: 2024-04-23

## 2024-04-21 RX ORDER — INSULIN GLARGINE 100 [IU]/ML
12 INJECTION, SOLUTION SUBCUTANEOUS ONCE
Status: COMPLETED | OUTPATIENT
Start: 2024-04-21 | End: 2024-04-21

## 2024-04-21 RX ADMIN — INSULIN LISPRO 6 UNITS: 100 INJECTION, SOLUTION INTRAVENOUS; SUBCUTANEOUS at 09:23

## 2024-04-21 RX ADMIN — INSULIN LISPRO 9 UNITS: 100 INJECTION, SOLUTION INTRAVENOUS; SUBCUTANEOUS at 16:27

## 2024-04-21 RX ADMIN — POLYETHYLENE GLYCOL 3350 17 G: 17 POWDER, FOR SOLUTION ORAL at 17:22

## 2024-04-21 RX ADMIN — ALBUMIN HUMAN 12.5 G: 0.25 SOLUTION INTRAVENOUS at 11:56

## 2024-04-21 RX ADMIN — FLUDROCORTISONE ACETATE 0.1 MG: 0.1 TABLET ORAL at 04:24

## 2024-04-21 RX ADMIN — INSULIN GLARGINE 12 UNITS: 100 INJECTION, SOLUTION SUBCUTANEOUS at 09:27

## 2024-04-21 RX ADMIN — INSULIN LISPRO 3 UNITS: 100 INJECTION, SOLUTION INTRAVENOUS; SUBCUTANEOUS at 20:33

## 2024-04-21 RX ADMIN — MEROPENEM 1 G: 1 INJECTION, POWDER, FOR SOLUTION INTRAVENOUS at 20:25

## 2024-04-21 RX ADMIN — MICAFUNGIN 100 MG: 20 INJECTION, POWDER, LYOPHILIZED, FOR SOLUTION INTRAVENOUS at 15:44

## 2024-04-21 RX ADMIN — HYDROCORTISONE SODIUM SUCCINATE 50 MG: 100 INJECTION, POWDER, FOR SOLUTION INTRAMUSCULAR; INTRAVENOUS at 12:25

## 2024-04-21 RX ADMIN — MIDODRINE HYDROCHLORIDE 20 MG: 10 TABLET ORAL at 01:56

## 2024-04-21 RX ADMIN — ESOMEPRAZOLE MAGNESIUM 40 MG: 40 FOR SUSPENSION ORAL at 06:43

## 2024-04-21 RX ADMIN — MEROPENEM 1 G: 1 INJECTION, POWDER, FOR SOLUTION INTRAVENOUS at 08:03

## 2024-04-21 RX ADMIN — PHYTONADIONE 10 MG: 10 INJECTION, EMULSION INTRAMUSCULAR; INTRAVENOUS; SUBCUTANEOUS at 09:22

## 2024-04-21 RX ADMIN — VANCOMYCIN HYDROCHLORIDE 2000 MG: 5 INJECTION, POWDER, LYOPHILIZED, FOR SOLUTION INTRAVENOUS at 15:38

## 2024-04-21 RX ADMIN — SODIUM PHOSPHATE, MONOBASIC, MONOHYDRATE AND SODIUM PHOSPHATE, DIBASIC, ANHYDROUS 21 MMOL: 142; 276 INJECTION, SOLUTION INTRAVENOUS at 15:45

## 2024-04-21 RX ADMIN — IOHEXOL 90 ML: 350 INJECTION, SOLUTION INTRAVENOUS at 23:47

## 2024-04-21 RX ADMIN — MIDODRINE HYDROCHLORIDE 20 MG: 10 TABLET ORAL at 09:22

## 2024-04-21 RX ADMIN — HYDROCORTISONE SODIUM SUCCINATE 50 MG: 100 INJECTION, POWDER, FOR SOLUTION INTRAMUSCULAR; INTRAVENOUS at 01:56

## 2024-04-21 RX ADMIN — INSULIN GLARGINE 12 UNITS: 100 INJECTION, SOLUTION SUBCUTANEOUS at 20:33

## 2024-04-21 RX ADMIN — ALBUMIN HUMAN 25 G: 0.25 SOLUTION INTRAVENOUS at 06:43

## 2024-04-21 RX ADMIN — HEPARIN SODIUM 7500 UNITS: 5000 INJECTION INTRAVENOUS; SUBCUTANEOUS at 09:23

## 2024-04-21 RX ADMIN — HYDROCORTISONE SODIUM SUCCINATE 50 MG: 100 INJECTION, POWDER, FOR SOLUTION INTRAMUSCULAR; INTRAVENOUS at 17:22

## 2024-04-21 RX ADMIN — NOREPINEPHRINE BITARTRATE 0.05 MCG/KG/MIN: 8 INJECTION, SOLUTION INTRAVENOUS at 12:58

## 2024-04-21 RX ADMIN — DOCUSATE SODIUM 100 MG: 50 LIQUID ORAL at 20:25

## 2024-04-21 RX ADMIN — ALBUMIN HUMAN 12.5 G: 0.25 SOLUTION INTRAVENOUS at 17:22

## 2024-04-21 RX ADMIN — HYDROMORPHONE HYDROCHLORIDE 0.2 MG: 1 INJECTION, SOLUTION INTRAMUSCULAR; INTRAVENOUS; SUBCUTANEOUS at 13:08

## 2024-04-21 RX ADMIN — INSULIN LISPRO 2 UNITS: 100 INJECTION, SOLUTION INTRAVENOUS; SUBCUTANEOUS at 12:27

## 2024-04-21 RX ADMIN — MIDODRINE HYDROCHLORIDE 20 MG: 10 TABLET ORAL at 17:22

## 2024-04-21 RX ADMIN — FLUDROCORTISONE ACETATE 0.1 MG: 0.1 TABLET ORAL at 15:39

## 2024-04-21 RX ADMIN — INSULIN LISPRO 2 UNITS: 100 INJECTION, SOLUTION INTRAVENOUS; SUBCUTANEOUS at 00:15

## 2024-04-21 RX ADMIN — POLYETHYLENE GLYCOL 3350 17 G: 17 POWDER, FOR SOLUTION ORAL at 08:03

## 2024-04-21 RX ADMIN — DOCUSATE SODIUM 100 MG: 50 LIQUID ORAL at 08:03

## 2024-04-21 RX ADMIN — INSULIN LISPRO 6 UNITS: 100 INJECTION, SOLUTION INTRAVENOUS; SUBCUTANEOUS at 05:12

## 2024-04-21 ASSESSMENT — PAIN - FUNCTIONAL ASSESSMENT
PAIN_FUNCTIONAL_ASSESSMENT: CPOT (CRITICAL CARE PAIN OBSERVATION TOOL)
PAIN_FUNCTIONAL_ASSESSMENT: 0-10
PAIN_FUNCTIONAL_ASSESSMENT: CPOT (CRITICAL CARE PAIN OBSERVATION TOOL)
PAIN_FUNCTIONAL_ASSESSMENT: 0-10
PAIN_FUNCTIONAL_ASSESSMENT: CPOT (CRITICAL CARE PAIN OBSERVATION TOOL)
PAIN_FUNCTIONAL_ASSESSMENT: CPOT (CRITICAL CARE PAIN OBSERVATION TOOL)

## 2024-04-21 ASSESSMENT — COGNITIVE AND FUNCTIONAL STATUS - GENERAL
DRESSING REGULAR UPPER BODY CLOTHING: TOTAL
MOVING TO AND FROM BED TO CHAIR: TOTAL
TURNING FROM BACK TO SIDE WHILE IN FLAT BAD: TOTAL
EATING MEALS: TOTAL
WALKING IN HOSPITAL ROOM: TOTAL
STANDING UP FROM CHAIR USING ARMS: TOTAL
DRESSING REGULAR LOWER BODY CLOTHING: TOTAL
PERSONAL GROOMING: TOTAL
CLIMB 3 TO 5 STEPS WITH RAILING: TOTAL
TOILETING: TOTAL
HELP NEEDED FOR BATHING: TOTAL
MOVING FROM LYING ON BACK TO SITTING ON SIDE OF FLAT BED WITH BEDRAILS: TOTAL

## 2024-04-21 ASSESSMENT — PAIN SCALES - GENERAL
PAINLEVEL_OUTOF10: 0 - NO PAIN
PAINLEVEL_OUTOF10: 0 - NO PAIN

## 2024-04-21 NOTE — PROGRESS NOTES
Jason Hollins is a 63 y.o. male on day 47 of admission presenting with Soft tissue sarcoma (Multi).    Subjective   Interval History: Called back due to mental status changes and rising wbc count. Last seen Friday 4/19. Currently patient on low dose of pressors with CVVH and less responsive, more sleepy today. WBC 12.8. Less alert today, although able to follow commands.        Review of Systems as above    Objective   Range of Vitals (last 24 hours)  Heart Rate:  []   Temp:  [36.4 °C (97.5 °F)-37.6 °C (99.7 °F)]   Resp:  [0-41]   BP: (106-157)/()   SpO2:  [92 %-100 %]   Daily Weight  04/20/24 : 135 kg (298 lb 1 oz)    Body mass index is 42.77 kg/m².    Physical Exam  Obese, jaundiced, s/p trach  Minimal response to voice and command but is able to move thumb when asked to give thumbs up  Heart rrr, lungs with vent sounds  Abdomen FIRM, mild protuberance in epigastric region  3 ANDERSON drains, serosanguinous  Did not examine buttock but wound vac in place    Relevant Results  Labs  Results from last 72 hours   Lab Units 04/21/24  1241 04/21/24  0200 04/20/24  1356 04/19/24  1207 04/19/24  0559   WBC AUTO x10*3/uL 12.9*  12.9* 12.6* 10.6   < > 10.6   HEMOGLOBIN g/dL 8.0*  8.0* 8.2* 8.5*   < > 9.0*   HEMATOCRIT % 23.2*  23.2* 24.4* 23.3*   < > 24.8*   PLATELETS AUTO x10*3/uL 164  164 169 122*   < > 116*   LYMPHO PCT MAN % 2.5  --   --   --  0.8   MONO PCT MAN % 2.5  --   --   --  0.8   EOSINO PCT MAN % 0.0  --   --   --  0.0    < > = values in this interval not displayed.     Results from last 72 hours   Lab Units 04/21/24  1241 04/21/24  0200 04/20/24  1356   SODIUM mmol/L 135* 134* 136   POTASSIUM mmol/L 4.7 4.8 4.7   CHLORIDE mmol/L 99 98 99   CO2 mmol/L 23 21 23   BUN mg/dL 41* 40* 45*   CREATININE mg/dL 1.17 1.28 1.40*   GLUCOSE mg/dL 235* 222* 174*   CALCIUM mg/dL 8.9 8.4* 8.6   ANION GAP mmol/L 18 20 19   EGFR mL/min/1.73m*2 70 63 57*   PHOSPHORUS mg/dL 2.0* 2.3* 2.8     Results from last 72 hours   Lab  "Units 04/21/24  1241 04/21/24  0200 04/20/24  1356   ALK PHOS U/L 148* 143* 137*   BILIRUBIN TOTAL mg/dL 22.6* 22.9* 22.6*   BILIRUBIN DIRECT mg/dL 15.6* 15.9* 16.2*   PROTEIN TOTAL g/dL 5.9* 5.3* 5.2*   ALT U/L 6* 7* 7*   AST U/L 65* 73* 87*   ALBUMIN g/dL 3.9 3.8 3.6     Estimated Creatinine Clearance: 90.6 mL/min (by C-G formula based on SCr of 1.17 mg/dL).  No results found for: \"CRP\"  Microbiology  Susceptibility data from last 14 days.  Collected Specimen Info Organism Amoxicillin/Clavulanate Ampicillin Ampicillin/Sulbactam Cefazolin Cefepime Ciprofloxacin Ertapenem Gentamicin Imipenem Levofloxacin Meropenem Piperacillin/Tazobactam   04/11/24 Tissue from ABSCESS Klebsiella (Enterobacter) aerogenes R R R R S S S S R S S S     Collected Specimen Info Organism Trimethoprim/Sulfamethoxazole   04/11/24 Tissue from ABSCESS Klebsiella (Enterobacter) aerogenes S   Bld 4/21 x2 pending    Imaging  CXR 4/20  1.  Similar appearance of bilateral basilar prominent extensive  airspace disease, right greater than left.  2. Similar appearance of layering pleural effusions,  right-greater-than-left.  3. Medical devices as above.    Assessment/Plan   63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 Course subsequently included PEA arrest in setting of PE 3/20, hemorrhagic shock from R thigh wound bleeding, trach 4/1 with trach revision 4/4 in setting of bleeding.     He developed a K. Aerogenes bacteremia in setting of VAP, for which he was planned for 10 days of therapy. His procedure was delayed until today, and in the setting of recent and now resolved hypotension, we were contacted regarding further management. Depending on depth of wound and results of any obtained cultures, we can finalize a course.     04/14/2024 Update:       4/3/2024 BAL growing Klebsiella aerogenes       4/3/2024 blood growing  Klebsiella aerogenes      "  4/11/2024 surgical specimen from gluteal closure and reconstruction growing Klebsiella aerogenes.          I reviewed susceptibility testing patterns for all 3 isolates.  Some subtle differences but suspect these are all similar reflecting patient's colonization status with Klebsiella originates.  On 4/11/2024 patient had tissue advancement and closure of complex right leg and buttock wound.  Large area of necrotic tissue was removed and culture positive for Klebsiella aerogenes.       Therefore would extend meropenem treatment course an additional 7 days while monitoring for postoperative healing or complications.           THUS new stop date tentatively set in 7 days would be 4/21/2024.  At that time would need to reassess and consider stopping or extending further depending on clinical status and wound healing.     04/19/2024 Update: OR on 4/18 with finding of hematoma and diffuse bleed but per verbal report from plastics, no infection or necrotic tissue and no obvious single source of bleed. No cultures thus obtained. Plan will continue meropenem for 2 weeks from this OR date.    04/21/2024 update: Called back for clinical status change - more confusion and mild leukocytosis, trending upwards from normal baseline. Firm abdomen - suspect possible GI issue (infection vs ileus/constipation). Agree with cultures and broadening abx.     Recommendations:  - continue meropenem 1g q12h IV (dosed for CVVH)  - start vncomycin, dose for -600 per pharmacy  - start micafungin 100mg q24h IV for empiric fungal coverage  - please obtain CT AP, with contrast if possible     Discussed with Dr. Mason  ID will follow.      Debbie Adam MD  PGY-5 ID Fellow  Team A - pager [phone]  For new consults, page [phone]

## 2024-04-21 NOTE — CONSULTS
"Vancomycin Dosing by Pharmacy- INITIAL    Jason Hollins is a 63 y.o. year old male who Pharmacy has been consulted for vancomycin dosing for other broad coverage ( [facility]) . Based on the patient's indication and renal status this patient will be dosed based on a goal AUC of 400-600.     Renal function is currently CVVH (since 4/18).    Visit Vitals  /66   Pulse 108   Temp 37.6 °C (99.7 °F) (Temporal)   Resp (!) 35        Lab Results   Component Value Date    CREATININE 1.28 04/21/2024    CREATININE 1.40 (H) 04/20/2024    CREATININE 1.70 (H) 04/20/2024    CREATININE 1.61 (H) 04/20/2024        Patient weight is No results found for: \"PTWEIGHT\"    No results found for: \"CULTURE\"     I/O last 3 completed shifts:  In: 2493.9 (18.4 mL/kg) [I.V.:243.9 (1.8 mL/kg); Blood:200; NG/GT:1550; IV Piggyback:500]  Out: 2394 (17.7 mL/kg) [Urine:17 (0 mL/kg/hr); Drains:690; Other:1687]  Weight: 135.2 kg   [unfilled]    Lab Results   Component Value Date    PATIENTTEMP 37.0 04/21/2024    PATIENTTEMP 37.0 04/20/2024    PATIENTTEMP 37.0 04/20/2024          Assessment/Plan     Patient will be given a loading dose of 2000 mg.  Will initiate vancomycin maintenance,  1250 mg every 12 hours- NEED to schedule to start 12h after load*    This dosing regimen is predicted by [employer] to result in the following pharmacokinetic parameters:  <<<<<PASTE [employer] DATA HERE>>>>>    Follow-up level will be ordered on 4/22 at 1300 (before 3rd dose of vancomycin while on CVVH) unless clinically indicated sooner.  Will continue to monitor renal function daily while on vancomycin and order serum creatinine at least every 48 hours if not already ordered.  Follow for continued vancomycin needs, clinical response, and signs/symptoms of toxicity.       Avis Wen, PharmD       "

## 2024-04-21 NOTE — CARE PLAN
Problem: Diabetes  Goal: Achieve decreasing blood glucose levels by end of shift  Outcome: Progressing  Goal: Increase stability of blood glucose readings by end of shift  Outcome: Progressing  Goal: Decrease in ketones present in urine by end of shift  Outcome: Progressing  Goal: Maintain electrolyte levels within acceptable range throughout shift  Outcome: Progressing  Goal: Maintain glucose levels >70mg/dl to <250mg/dl throughout shift  Outcome: Progressing  Goal: No changes in neurological exam by end of shift  Outcome: Progressing  Goal: Learn about and adhere to nutrition recommendations by end of shift  Outcome: Progressing  Goal: Vital signs within normal range for age by end of shift  Outcome: Progressing  Goal: Increase self care and/or family involovement by end of shift  Outcome: Progressing  Goal: Receive DSME education by end of shift  Outcome: Progressing     Problem: Skin  Goal: Prevent/manage excess moisture  Outcome: Progressing  Goal: Prevent/minimize sheer/friction injuries  Outcome: Progressing  Goal: Promote/optimize nutrition  Outcome: Progressing  Goal: Promote skin healing  Outcome: Progressing     Problem: Pain  Goal: Takes deep breaths with improved pain control throughout the shift  Outcome: Progressing  Goal: Turns in bed with improved pain control throughout the shift  Outcome: Progressing  Goal: Walks with improved pain control throughout the shift  Outcome: Progressing  Goal: Performs ADL's with improved pain control throughout shift  Outcome: Progressing  Goal: Participates in PT with improved pain control throughout the shift  Outcome: Progressing     Problem: Chronic Conditions and Co-morbidities  Goal: Patient's chronic conditions and co-morbidity symptoms are monitored and maintained or improved  Outcome: Progressing     Problem: Discharge Planning  Goal: Discharge to home or other facility with appropriate resources  Outcome: Progressing

## 2024-04-21 NOTE — PROGRESS NOTES
Jason Hollins is a 63 y.o. male on day 47 of admission presenting with Soft tissue sarcoma (Multi).      Subjective   Seen and examined  No issues reported with CVVH o/n       Objective   Vitals 24HR  Heart Rate:  []   Temp:  [36.1 °C (97 °F)-37.6 °C (99.7 °F)]   Resp:  [0-41]   BP: (108-141)/(55-75)   SpO2:  [92 %-100 %]       Intake/Output last 3 Shifts:    Intake/Output Summary (Last 24 hours) at 4/21/2024 1047  Last data filed at 4/21/2024 1000  Gross per 24 hour   Intake 1651.62 ml   Output 2133 ml   Net -481.38 ml   Anuric    Physical Exam  General appearance: intubated, trached  Neck: trach  Heart: RRR  Lungs: FiO2 40% on vent via trach  : no Marino  Extremities: anasarca+  Neuro: not following commands    Results from last 72 hours   Lab Units 04/21/24  0200 04/20/24  1356 04/20/24  0605   SODIUM mmol/L 134* 136 137   POTASSIUM mmol/L 4.8 4.7 4.7   CHLORIDE mmol/L 98 99 101   CO2 mmol/L 21 23 22   BUN mg/dL 40* 45* 51*   PHOSPHORUS mg/dL 2.3* 2.8 3.3   HEMOGLOBIN g/dL 8.2* 8.5* 8.4*      Scheduled medications  albumin human, 25 g, intravenous, q8h  docusate sodium, 100 mg, nasogastric tube, BID  esomeprazole, 40 mg, nasogastric tube, Daily  fludrocortisone, 0.1 mg, nasogastric tube, q12h  heparin (porcine), 7,500 Units, subcutaneous, q8h LEILA  hydrocortisone sodium succinate, 50 mg, intravenous, q12h  insulin glargine, 12 Units, subcutaneous, q12h  insulin lispro, 0-5 Units, subcutaneous, TID with meals  meropenem, 1 g, intravenous, q12h  midodrine, 20 mg, orogastric tube, q8h  oxygen, , inhalation, Continuous - Inhalation  phytonadione, 10 mg, intravenous, Daily  polyethylene glycol, 17 g, oral, Once  polyethylene glycol, 17 g, oral, Daily  sodium phosphate, 15 mmol, intravenous, Once      Continuous medications  lactated Ringer's, 5 mL/hr, Last Rate: Stopped (04/21/24 0000)  norepinephrine, 0.01-0.05 mcg/kg/min (Dosing Weight), Last Rate: 0.05 mcg/kg/min (04/21/24 1040)  PrismaSol 4/2.5, 3,000 mL/hr,  Last Rate: 3,000 mL/hr (04/20/24 0314)      PRN medications  PRN medications: acetaminophen, alteplase, alteplase, calcium gluconate, calcium gluconate, dextrose, glucagon, heparin, heparin, labetaloL, magnesium sulfate, magnesium sulfate, midodrine, oxyCODONE, oxyCODONE, sodium chloride        Assessment/Plan   Jason Hollins is a 63 y.o. 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 Hawkins and Dr. Smith on 3/5/24. On 3/20, he developed massive PE and was given TPA, taken to OR in setting of increased swelling at flap site- hematoma for exploration and evacuation. Nephrology following for RUTH.     # Oliguric RUTH due to hemodynamic injury  On CVVH for clearance and volume control-on UF rate of 50 ml/hr, increase as tolerated per ICU  No issues reported  Prescription reviewed, 4 K, continue same   Mild hypocalcemia, hypoPO4 replete per protocol  Avoid nephrotoxins, dose to eGFR <15 ml/min  Will follow

## 2024-04-21 NOTE — PROGRESS NOTES
Jason Hollins is a 63 y.o. male on day 47 of admission presenting with Soft tissue sarcoma (Multi).    Subjective   Had some tachypnea and placed on 50%  Norepi at 0.04 mcg/kg/min  Glucose in low-200s and sliding scale insulin coverage increased to scale #2      Objective     Physical Exam  Constitutional:       General: He is not in acute distress.     Appearance: He is obese. He is ill-appearing.   HENT:      Nose:      Comments: Dobhoff in R nare, bridled in place.      Mouth/Throat:      Mouth: Mucous membranes are moist.   Eyes:      General: Scleral icterus present.      Extraocular Movements: Extraocular movements intact.      Pupils: Pupils are equal, round, and reactive to light.   Neck:      Comments: LIJ trialysis in place, dressing c/d/i.   Cardiovascular:      Rate and Rhythm: Normal rate and regular rhythm.      Pulses:           Radial pulses are 1+ on the right side and 1+ on the left side.        Dorsalis pedis pulses are detected w/ Doppler on the right side and detected w/ Doppler on the left side.        Posterior tibial pulses are detected w/ Doppler on the right side and detected w/ Doppler on the left side.      Heart sounds: Normal heart sounds.   Pulmonary:      Comments: Diffuse inspiratory rales and diminished bases bilaterally. Currently on SIMV 50%/500/16/+10/12PS.  Abdominal:      General: Bowel sounds are normal. There is distension.      Tenderness: There is no abdominal tenderness.      Comments: Obese abdomen, firm, anasarca.   Genitourinary:     Penis: Swelling present.       Comments: Scrotal edema. Marino in place with minimal dark sharee UOP.  Musculoskeletal:      Right lower leg: 3+ Edema present.      Left lower leg: 3+ Edema present.      Comments: 3+ pitting edema noted bilaterally in upper and lower extremities. R thigh with mild swelling (improved from prior day) and with surgical incision sites clean, dry, intact. 3 ANDERSON drains and wound vac with minimal output this morning.  "  Skin:     General: Skin is warm and dry.      Coloration: Skin is jaundiced.      Comments: ANDERSON drain x2 and wound vac to R gluteal flap site.   Neurological:      Mental Status: He is alert.      Comments: Alert, nodding appropriately to yes/no questions. Following simple commands with BUE and LLE, no movement with RLE.   Psychiatric:      Comments: Calm and cooperative.     Last Recorded Vitals  Blood pressure 135/62, pulse 108, temperature 36.7 °C (98.1 °F), temperature source Temporal, resp. rate (!) 36, height 1.778 m (5' 10\"), weight 135 kg (298 lb 1 oz), SpO2 99%.  Intake/Output last 3 Shifts:  I/O last 3 completed shifts:  In: 2517.8 (18.6 mL/kg) [I.V.:202.8 (1.5 mL/kg); Blood:400; NG/GT:1315; IV Piggyback:600]  Out: 1432 (10.6 mL/kg) [Urine:50 (0 mL/kg/hr); Drains:885; Other:497]  Weight: 135.2 kg     Relevant Results  Scheduled medications  albumin human, 25 g, intravenous, q8h  docusate sodium, 100 mg, nasogastric tube, BID  esomeprazole, 40 mg, nasogastric tube, Daily  fludrocortisone, 0.1 mg, nasogastric tube, q12h  hydrocortisone sodium succinate, 50 mg, intravenous, q8h  [Held by provider] insulin glargine, 12 Units, subcutaneous, q12h  insulin lispro, 0-10 Units, subcutaneous, 6x daily  meropenem, 1 g, intravenous, q12h  midodrine, 20 mg, orogastric tube, q8h  oxygen, , inhalation, Continuous - Inhalation  sodium phosphate, 15 mmol, intravenous, Once      Continuous medications  lactated Ringer's, 5 mL/hr, Last Rate: Stopped (04/21/24 0000)  norepinephrine, 0.01-0.05 mcg/kg/min (Dosing Weight), Last Rate: 0.04 mcg/kg/min (04/21/24 0200)  PrismaSol 4/2.5, 3,000 mL/hr, Last Rate: 3,000 mL/hr (04/20/24 0314)      PRN medications  PRN medications: acetaminophen, alteplase, alteplase, calcium gluconate, calcium gluconate, dextrose, glucagon, heparin, heparin, labetaloL, magnesium sulfate, magnesium sulfate, midodrine, oxyCODONE, oxyCODONE, sodium chloride  Results for orders placed or performed during " the hospital encounter of 03/05/24 (from the past 24 hour(s))   POCT GLUCOSE   Result Value Ref Range    POCT Glucose 158 (H) 74 - 99 mg/dL   POCT GLUCOSE   Result Value Ref Range    POCT Glucose 181 (H) 74 - 99 mg/dL   Calcium, ionized   Result Value Ref Range    POCT Calcium, Ionized 1.12 1.1 - 1.33 mmol/L   CBC   Result Value Ref Range    WBC 10.6 4.4 - 11.3 x10*3/uL    nRBC 0.7 (H) 0.0 - 0.0 /100 WBCs    RBC 2.93 (L) 4.50 - 5.90 x10*6/uL    Hemoglobin 8.5 (L) 13.5 - 17.5 g/dL    Hematocrit 23.3 (L) 41.0 - 52.0 %    MCV 80 80 - 100 fL    MCH 29.0 26.0 - 34.0 pg    MCHC 36.5 (H) 32.0 - 36.0 g/dL    RDW 19.2 (H) 11.5 - 14.5 %    Platelets 122 (L) 150 - 450 x10*3/uL   Magnesium   Result Value Ref Range    Magnesium 2.49 (H) 1.60 - 2.40 mg/dL   Coagulation Screen   Result Value Ref Range    Protime 19.2 (H) 9.8 - 12.8 seconds    INR 1.7 (H) 0.9 - 1.1    aPTT 43 (H) 27 - 38 seconds   Hepatic function panel   Result Value Ref Range    Albumin 3.6 3.4 - 5.0 g/dL    Bilirubin, Total 22.6 (H) 0.0 - 1.2 mg/dL    Bilirubin, Direct 16.2 (H) 0.0 - 0.3 mg/dL    Alkaline Phosphatase 137 (H) 33 - 136 U/L    ALT 7 (L) 10 - 52 U/L    AST 87 (H) 9 - 39 U/L    Total Protein 5.2 (L) 6.4 - 8.2 g/dL   Fibrinogen   Result Value Ref Range    Fibrinogen 238 200 - 400 mg/dL   Basic Metabolic Panel   Result Value Ref Range    Glucose 174 (H) 74 - 99 mg/dL    Sodium 136 136 - 145 mmol/L    Potassium 4.7 3.5 - 5.3 mmol/L    Chloride 99 98 - 107 mmol/L    Bicarbonate 23 21 - 32 mmol/L    Anion Gap 19 10 - 20 mmol/L    Urea Nitrogen 45 (H) 6 - 23 mg/dL    Creatinine 1.40 (H) 0.50 - 1.30 mg/dL    eGFR 57 (L) >60 mL/min/1.73m*2    Calcium 8.6 8.6 - 10.6 mg/dL   Phosphorus   Result Value Ref Range    Phosphorus 2.8 2.5 - 4.9 mg/dL   Blood Gas Arterial Full Panel   Result Value Ref Range    POCT pH, Arterial 7.44 (H) 7.38 - 7.42 pH    POCT pCO2, Arterial 34 (L) 38 - 42 mm Hg    POCT pO2, Arterial 64 (L) 85 - 95 mm Hg    POCT SO2, Arterial 94 94 -  100 %    POCT Oxy Hemoglobin, Arterial 91.7 (L) 94.0 - 98.0 %    POCT Hematocrit Calculated, Arterial 26.0 (L) 41.0 - 52.0 %    POCT Sodium, Arterial 132 (L) 136 - 145 mmol/L    POCT Potassium, Arterial 4.9 3.5 - 5.3 mmol/L    POCT Chloride, Arterial 102 98 - 107 mmol/L    POCT Ionized Calcium, Arterial 1.18 1.10 - 1.33 mmol/L    POCT Glucose, Arterial 179 (H) 74 - 99 mg/dL    POCT Lactate, Arterial 1.8 0.4 - 2.0 mmol/L    POCT Base Excess, Arterial -0.8 -2.0 - 3.0 mmol/L    POCT HCO3 Calculated, Arterial 23.1 22.0 - 26.0 mmol/L    POCT Hemoglobin, Arterial 8.8 (L) 13.5 - 17.5 g/dL    POCT Anion Gap, Arterial 12 10 - 25 mmo/L    Patient Temperature 37.0 degrees Celsius    FiO2 40 %   POCT GLUCOSE   Result Value Ref Range    POCT Glucose 175 (H) 74 - 99 mg/dL   POCT GLUCOSE   Result Value Ref Range    POCT Glucose 197 (H) 74 - 99 mg/dL   Blood Gas Arterial Full Panel   Result Value Ref Range    POCT pH, Arterial 7.43 (H) 7.38 - 7.42 pH    POCT pCO2, Arterial 35 (L) 38 - 42 mm Hg    POCT pO2, Arterial 64 (L) 85 - 95 mm Hg    POCT SO2, Arterial 94 94 - 100 %    POCT Oxy Hemoglobin, Arterial 90.8 (L) 94.0 - 98.0 %    POCT Hematocrit Calculated, Arterial 26.0 (L) 41.0 - 52.0 %    POCT Sodium, Arterial 133 (L) 136 - 145 mmol/L    POCT Potassium, Arterial 4.9 3.5 - 5.3 mmol/L    POCT Chloride, Arterial 103 98 - 107 mmol/L    POCT Ionized Calcium, Arterial 1.16 1.10 - 1.33 mmol/L    POCT Glucose, Arterial 210 (H) 74 - 99 mg/dL    POCT Lactate, Arterial 1.8 0.4 - 2.0 mmol/L    POCT Base Excess, Arterial -0.9 -2.0 - 3.0 mmol/L    POCT HCO3 Calculated, Arterial 23.2 22.0 - 26.0 mmol/L    POCT Hemoglobin, Arterial 8.8 (L) 13.5 - 17.5 g/dL    POCT Anion Gap, Arterial 12 10 - 25 mmo/L    Patient Temperature 37.0 degrees Celsius    FiO2 40 %   POCT GLUCOSE   Result Value Ref Range    POCT Glucose 227 (H) 74 - 99 mg/dL   Calcium, ionized   Result Value Ref Range    POCT Calcium, Ionized 1.04 (L) 1.1 - 1.33 mmol/L   CBC   Result  Value Ref Range    WBC 12.6 (H) 4.4 - 11.3 x10*3/uL    nRBC 0.2 (H) 0.0 - 0.0 /100 WBCs    RBC 2.84 (L) 4.50 - 5.90 x10*6/uL    Hemoglobin 8.2 (L) 13.5 - 17.5 g/dL    Hematocrit 24.4 (L) 41.0 - 52.0 %    MCV 86 80 - 100 fL    MCH 28.9 26.0 - 34.0 pg    MCHC 33.6 32.0 - 36.0 g/dL    RDW 21.2 (H) 11.5 - 14.5 %    Platelets 169 150 - 450 x10*3/uL    MPV 12.9 (H) 7.5 - 11.5 fL   Coagulation Screen   Result Value Ref Range    Protime 21.1 (H) 9.8 - 12.8 seconds    INR 1.9 (H) 0.9 - 1.1    aPTT 43 (H) 27 - 38 seconds   Hepatic function panel   Result Value Ref Range    Albumin 3.8 3.4 - 5.0 g/dL    Bilirubin, Total 22.9 (H) 0.0 - 1.2 mg/dL    Bilirubin, Direct 15.9 (H) 0.0 - 0.3 mg/dL    Alkaline Phosphatase 143 (H) 33 - 136 U/L    ALT 7 (L) 10 - 52 U/L    AST 73 (H) 9 - 39 U/L    Total Protein 5.3 (L) 6.4 - 8.2 g/dL   Magnesium   Result Value Ref Range    Magnesium 2.64 (H) 1.60 - 2.40 mg/dL   Basic Metabolic Panel   Result Value Ref Range    Glucose 222 (H) 74 - 99 mg/dL    Sodium 134 (L) 136 - 145 mmol/L    Potassium 4.8 3.5 - 5.3 mmol/L    Chloride 98 98 - 107 mmol/L    Bicarbonate 21 21 - 32 mmol/L    Anion Gap 20 10 - 20 mmol/L    Urea Nitrogen 40 (H) 6 - 23 mg/dL    Creatinine 1.28 0.50 - 1.30 mg/dL    eGFR 63 >60 mL/min/1.73m*2    Calcium 8.4 (L) 8.6 - 10.6 mg/dL   Phosphorus   Result Value Ref Range    Phosphorus 2.3 (L) 2.5 - 4.9 mg/dL   POCT GLUCOSE   Result Value Ref Range    POCT Glucose 269 (H) 74 - 99 mg/dL       Assessment/Plan   Principal Problem:    Soft tissue sarcoma (Multi)  Active Problems:    Acute kidney injury (nontraumatic) (CMS-HCC)    Pulmonary embolus (Multi)    Anemia    Thrombocytopenia (CMS-HCC)    Current chronic use of systemic steroids    Gastroesophageal reflux disease without esophagitis    Extraskeletal myxoid chondrosarcoma (Multi)    Cardiopulmonary arrest (Multi)    Acute respiratory failure with hypoxia (Multi)    Tracheostomy hemorrhage (Multi)    Postoperative wound breakdown,  initial encounter    Postoperative wound breakdown, sequela    Hematoma    Jason Hollins is a 63 y.o. 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 Hawkins and Dr. Smith on 3/5/24.  Post operative course was uncomplicated and patient was on RNF until 3/20 for prolonged bed rest to avoid hip flexion to maintain flap integrity.  Patient was on prophylactic lovenox while on bedrest.      3/20: Cardiopulmonary arrest s/p CPR with ROSC after TPA, overnight started on heparin, epo, increased pressor requirements, increased swelling at flap site.      3/21: Hemorrhagic shock, MTP. Firm and large right flap site. Return to OR s/p Exploration of right thigh wound, Evacuation of hematoma. Suture ligation of multiple bleeding vessel and several points in gluteal area.      4/1: s/p Trach     4/4: return OR with ENT s/p laryngoscopy, esophagoscopy and bronchoscopy, trach revision. Found extensive clot burden in esophagus and stomach as well as in the lungs. Oozing at thyroid bed cauterized. Trach exchanged to new 6.0 prox XLT elvie. OR findings: blood up glottis and from thyroid bed to airway.     4/9: Patient is medically stable for OR on 4/11/24.  He is appropriately n.p.o. since midnight and has had more DVT prophylaxis held for OR today.     4/18: Patient bleeding into RLE given increased ANDERSON drain output and non-incrementing Hgb despite transfusion. Return to OR for washout.    Afternoon UPDATE/Edit on 4/21: - patient more somnolent, increasing WBC, Temp 99.7, Lactate 2.9 this afternoon, and increasing pressor requirements: sending blood culture x 2, resumed vancomycin, reengaged ID, and returned hydrocortisone dosing to q6hr, giving unit PRBC    Plan:  NEURO: History of myxoid chondrosarcoma s/p wide resection sarcoma, sciatic  nerve neurolysis of right lower extremity with right gluteal reconstruction, pedicle ALT, vastus lateralus flap, pedicle gluteal and perisformis flap with Dr. Hawkins and Dr. Smith on 3/5/24 s/p cardiopulmonary arrest with ROSC 3/20. CT head 3/22 without acute process. Weaned off cisatracurium and propofol overnight 3/23-3/24. Ketamine weaned off 3/25 and Fentanyl weaned off 3/26 am. Repeat CT Head 3/26 without acute process. MRI Brain 3/30, negative for cerebral infarction or hemorrhage. Neuro exam - following commands except for RLE, tracking, nodding/shaking head to questions.  - ongoing neuro and pain assessments  - PRN Oxycodone and Tylenol  - PT/OT -> AROM     CV: History of HTN, HLD. Acute cardiopulmonary arrest 2/2 to possible massive PE vs massive STEMI, received multiple rounds of CPR and one defibrillation.  Sustained ROSC achieved after TPA bolus. On high dose levophed, epinephrine, vaso, angioT2. Plan on 3/21 was for ECMO which was aborted secondary to large hemhorrge at right flap site. Had MTP and taken OR with 5L evacuated. ECHO 3/21: Normal LV, Mod enlarged RV, slight suggestion of a Townsend's sign / RV strain, low normal RV function. ECHO 3/22 with hyperdynamic LV. New onset Afib with RVR overnight 3/22-3/23, dosed with 150mg amio x2, started infusion at 1mg/min thereafter. AT2 weaned off. Amio infusion discontinued 3/25. Lactate downtrending. Vaso and epi weaned off. Remains in NSR. iEpo weaned off 3/27. Repeat TTE 3/29 and 4/2 essentially unchanged. Levo resumed 4/2 evening to continue aggressive fluid removal. Repeat TTE on 4/10 with LVEF 65-70% and RV difficulty to assess. Weaned off levo 4/13. Marginal hypotension 4/20 and norepi resumed.   - continuous EKG/abp monitoring  - Goal map range 65-90  - continue levophed infusion for fluid removal with CVVH  - continue midodrine 20mg q8h (increased back to 20 mg on 4/20)  - Continue florinef 0.1mg BID   - returned hydrocortisone dosing to  q6hr  - continue scheduled albumin 25% q6h x 4 more doses, reevaluate tomorrow     PULM: Arrived to SICU intubated julio c-CPR. Concern for massive PE. Started on iEpo, currently on 0.05. Hypoxic respiratory failure. Severe ARDS. ETT exchanged 3/23. CT Chest 3/26 with interval JOHN consolidative/ground glass opacity. S/p Tracheostomy on 4/1. S/p trach revision on 4/4. CXR with bilateraly pulmonary edema and pleural effusions R>L. Currently on SIMV 50%/500/16/+10/12PS.  - wean vent as tolerated, maintain SpO2 >90%, PaO2 >60  - additional bronchial hygiene as indicated  - Daily CXR  - ABG prn     ENT: Had epistaxis 3/23, completed afrin bid x3 days. S/p trach on 4/1. Bleeding from trach site 4/2 am, packing placed by ENT. Repeat episode of bloody tracheal secretions 4/3 through this am. Taken back to OR with ENT 4/4 s/p trach revision.  - ENT following     GI: NPO. Shock liver, pancreatitis. Elevated TG. Elevated lactate. Consulted ACS for potential bowel ischemia as cause of persistent lactic acidosis. CT imaging 3/22 with evidence of pancreatitis. No acute surgical indication per ACS. RUQ US 3/22 with thickening of GB wall without cholelithiasis. CT A/P 3/26 negative for acute bleed. LFTs downtrending. Worsening hyperbilirubinemia. Amylase and lipase now WNL. Repeat RUQ US 4/1 with nonspecific gallbladder wall edema and thickening which may be 2/2 generalized fluid overload, mild hepatomegaly with slight nodular contour and c/f steatosis and fibrosis, irregular hypoechoic region adjacent to hepatic veins. US liver with doppler 4/2 revealed hepatomegaly with nodular contour, mildly elevated RI in main and left hepatic arteries. Hyperbilirubinemia.  - TFs at goal 45ml/hr, prostat daily  - PPI daily for GI prophylaxis  - Trend LFTs daily  - continue colace  - 4/17 LAST BM - give miralax x 1 now and schedule daily, to get 2 doses today  - RUQ today, reengage hepatology tomorrow     : No history of renal disease. Baseline  creatinine 0.6. Anuric RUTH. Elevated CK, downtrending. Metabolic acidosis, total body fluid overload, hyperkalemia. Started on CVVH 3/21, stopped bicarb infusion 3/22. Marino removed 3/24. Hyponatremia resolved. Stopped CVVH 4/2.  Has been tolerating iHD. Net +ve 1467. CVVH restarted on 4/18 given hemorrhage and takeback to OR. Net negative 220 ml for past 24hrs.  - Nephrology following, appreciate recs  - continue CVVH and attempt fluid removal today at 50ml/hr  - RFP BID and PRN  - Replete electrolytes judiciously  - Phos 2.4, sodium phosphate, 15 mmol, intravenous, Once     HEME: Patient was on ppx lovenox for DVT prophylaxis prior to cardiopulmonary arrest. Concern for massive PE patient received bolus of TPA during CPR. Started on heparin infusion overnight given concern for PE. Hemorrhagic shock 3/21, MTP and back to OR s/p Exploration of right thigh woundEvacuation of hematoma. Suture ligation of multiple bleeding vessel and several points in gluteal area. Hematology consulted 3/22 to r/o HLH. Transfused 1 RBC overnight 3/22-3/23. Thrombocytopenia, coagulapathy, hypofibrinogenemia. LE Duplex 3/25 +acute occlusive R soleal DVT. Received 2u PRBC and 1u FFP on 3/26. Heparin infusion discontinued 3/26 to r/o HIT and transitioned to bival. PF4 negative, resumed heparin infuision 3/27. Elevated IL2R and decreased NK function. C/f HLH (low suspicion), empirically treated with decadron (3/28-3/31). Thrombocytopenia on 3/30 to 18k, received 5pk, did not increment. Heparin held. Thrombocytopenia likely 2/2 to consumptive process, hematology following. Received IVIG and 5pk plts 3/31. Pancytopenia persists, improving thrombocytopenia. On 4/18 increased bleeding noted into RLE. Blood transfusion ongoing and Hep gtt held.  - CBC and coags BID and PRN  - No full anticoagulation at this time  - Vascular Medicine following, appreciate consideration of IVC filter  - will repeat BUE and BLE venous duplexes on Monday  - Notify  Plastics if ANDERSON drain output exceeds >300 ml/hr  - close surveillance of RLE and drains  - maintain active T&S (4/18), oredered for tomorrow AM  - INR 1.9 --> Vit K 10mg next 3 days  - per Plastics, no subcutaneous hep for DVT prophylaxis  -unit PRBC in the setting of lactate 2.9 this afternoon     ENDO: History of DM2. A1c 7.5%. TSH WNL 1/2024. Previously hyperglycemic, but now recently hypoglycemic requiring D50. Glucose trended up since tube feed resumption. Insulin coverage increased to scale 2 overnight.  - resume Lantus 12U daily  increase to scale 3 SS coverage  - q4h accuchecks    ID: On broad antimicrobial therapy. MRSA screen + 2/28/24. Pan cultures sent 3/22. Sputum NTF, +MRSA screen (completed 5 day course mupirocin), UCx and BCx negative. Completed 7 day course vanc/zosyn 3/27. Febrile to 39.1 4/3, resent blood cultures and BAL and started vanco and zosyn. Switched zosyn to reynaldo, kept on vanco, added john. Blood cultures and sputum with Klebsiella. Repeat blood cultures sent 4/4 (neg final). Wound culture 4/11 +MDRO Klebsiella aerogenes (S meropenem). Urine culture 4/12 negative. MRSA negative 4/18.  - ID following, appreciate recs  - temp q4h, wbc daily  - continue meropenem (stop date 5/2/24 per ID)  - ongoing monitoring for s/s infection  Afternoon UPDATE - patient more somnolent, increasing WBC, Temp 99.7, Lactate 2.9 this afternoon, and increasing pressor requirements: sending blood culture x 2, resumed vancomycin, reengaged ID, and returned hydrocortisone dosing to q6hr, giving unit PRBC       Lines:  - LIJ trialysis (4/18, SICU)  - left radial arterial line (4/5, SICU)  - PIV x2  - Pending IR for tunneled line - will re-engage when patient clinically stable.     Dispo: Continue ICU care. Patient seen and discussed with ICU attending Dr. Ness.      Fernandez Ríos, APRN-CNP, DNP   SICU phone [phone]    Critical Care time: 50 minutes

## 2024-04-21 NOTE — PROGRESS NOTES
"Jason Hollins is a 63 y.o. male on day 47 of admission presenting with Soft tissue sarcoma (Multi).    Subjective   Laying in bed on ventilator via trach, less interactive this morning.     Objective     Physical Exam  On ventilator via trach  Continued jaundice  Abdomen distended and mildly firm  Pitting edema throughout   RLE with suture lines and wound vac, drains with minimal serosanguinous output   Less interactive, slower to follow commands     Last Recorded Vitals  Blood pressure 128/66, pulse 108, temperature 37.6 °C (99.7 °F), temperature source Temporal, resp. rate (!) 38, height 1.778 m (5' 10\"), weight 135 kg (298 lb 1 oz), SpO2 99%.  Intake/Output last 3 Shifts:  I/O last 3 completed shifts:  In: 2493.9 (18.4 mL/kg) [I.V.:243.9 (1.8 mL/kg); Blood:200; NG/GT:1550; IV Piggyback:500]  Out: 2394 (17.7 mL/kg) [Urine:17 (0 mL/kg/hr); Drains:690; Other:1687]  Weight: 135.2 kg     Assessment/Plan   Principal Problem:    Soft tissue sarcoma (Multi)  Active Problems:    Acute kidney injury (nontraumatic) (CMS-HCC)    Pulmonary embolus (Multi)    Anemia    Thrombocytopenia (CMS-HCC)    Current chronic use of systemic steroids    Gastroesophageal reflux disease without esophagitis    Extraskeletal myxoid chondrosarcoma (Multi)    Cardiopulmonary arrest (Multi)    Acute respiratory failure with hypoxia (Multi)    Tracheostomy hemorrhage (Multi)    Postoperative wound breakdown, initial encounter    Postoperative wound breakdown, sequela    Hematoma    Briefly, 63 year old male with history of myxoid chondrosarcoma who underwent wide resection with sciatic nerve neurolysis with right gluteal reconstruction and pedicle ALT, vastus lateralis flap, pedicle gluteal and flap with Dr. Hawkins and Dr. Smith on 3/5/24. Post-operative course complicated by cardiopulmonary arrest on 3/20 with suspected PE s/p TPA and initiation of heparin infusion complicated by hemorrhagic shock requiring massive transfusion necessitating " re-exploration of the right lower extremity. Course also complicated by need for tracheostomy and exchange. Patient started to have increased output from ANDERSON drains 4/18 at surgical site without incrementing with transfusions, therefore heparin infusion held and Plastic Surgery took back to OR for exploration with placement of wound vac. Remains on ventilator support, on CRRT.      Neuro: Awake but more lethargic and less interactive today, concern for potential hepatic encephalopathy vs. Multifactorial encephalopathy picture. Continue pain control. Passive range of motion at minimum with PT, goal to edge of bed when able. Delirium precautions.   CV: Continue midodrine and low dose norepinephrine today to facilitate volume removal via CRRT given total body volume overload. Stress dose steroids and florinef, continue.   Pulm: CXR reviewed, volume overload with mild improvement since volume removal intiated. Tolerating 50% FiO2 on SIMV via trach, wean support as able. CPAP trial today if able.  : RUTH necessitating CRRT, tolerated session of HD 4/15 prior to line holiday and was showing some signs of renal recovery prior to acute blood loss anemia that required return to OR. Placed back on CRRT upon arrival from OR. Goal negative 1 liter balance today as able. Optimize electrolytes. Bladder scan daily to assess for renal recovery.   GI: Will continue tube feeds and increase bowel regimen today. Continue PPI. Trend LFTs. Plan to reengage Hepatology given persistently elevated bilirubin and concern for coagulopathy and encephalopathy today.   Heme: Acute blood loss anemia s/p return to OR, stable. No current indication for transfusion, will monitor closely today. Maintain active type and screen. Appreciate Vascular Medicine input--Will need to discuss plan for anticoagulation strategy given failing systemic anticoagulation and may need to discuss IVC filter. Duplex scans to all extremities to evaluate for DVT scheduled.  Discuss with Plastic Surgery resuming SQH which was previously tolerated for prophylaxis given high risk. Mildly coagulopathic with INR 1.9 today--monitor.   ID: ID following, appreciate input. Will continue meropenem (tentative stop date now 5/2).   Endo: Wean steroids to BID. Continue SSI and assess for resuming long-acting insulin pending glucose control.     Dispo: Continue SICU care.      This critically ill patient continues to be at risk for clinically significant deterioration / failure due to the above mentioned dysfunctional, unstable organ systems.  I have personally identified and managed all complex critical care issues to prevent aforementioned clinical deterioration.  Critical care time is spent at bedside and/or the immediate area and has included, but is not limited to, the review of diagnostic tests, labs, radiographs, serial assessments of hemodynamics, respiratory status, ventilatory management, review of consult team recommendations, and family updates.  Time spent in procedures and teaching are reported separately. Critical Care Time: 32 minutes.     I spent 32 minutes in the professional and overall care of this patient.       04/21/24 at 11:04 AM - Verito Ness DO

## 2024-04-21 NOTE — PROGRESS NOTES
Department of Plastic and Reconstructive Surgery  Daily Progress Note    Patient Name: Jason Hollins  MRN: [id number]  Date:  04/21/24     Subjective   Found resting in bed in surgical ICU with family members at the bedside. Pt more somnolent on exam today, minimally responsive to verbal stimuli. Incisional wound vac intact to R hip surgical site, without issue overnight. ANDERSON drain outputs appropriately down-trending (611 -> 411 ml dark bloody serous drainage total over the past 48 hrs). Given increase in WBC count, lactic, pressor requirement and marginal temperature, pt initiated on broader spectrum abx, blood cxs obtained and pt planned for RUQ US plus contrasted CT head, chest, abdomen and pelvis.      Objective   Physical Exam   Constitutional: Appears critically ill. Lying in bed in surgical ICU, more somnolent and minimally responsive to verbal stimuli today.   ENMT: MMM, dobhoff in R nare.  Cardiovascular: RRR on telemetry monitor.  Respiratory/Thorax: Trach to vent.  Gastrointestinal: Abdomen soft, protuberant.   Musculoskeletal: Minimal participation in motor exam today, diminished generalized strength.   Extremities: Generalized pitting edema. Right thigh edematous, edema improved S/P hematoma evacuation. Visible portion of flap recipient site and adjacent anterior R thigh incisions appear stable. Posterior flap incision line and posterior thigh/gluteal region incision dressed with incisional wound vac, maintaining low continuous suction at -50mmHg, no alarms for leak, obstruction or malfunction, no output in collecting canister. ANDERSON drain x 3 right upper leg with appropriate dark red, bloody drainage since OR.  Neurological: Off sedation, able to respond to commands with head nod, more somnolent today.   Skin: Edematous, juandice, warm.     Current Medications  Scheduled medications  albumin human, 12.5 g, intravenous, q6h  docusate sodium, 100 mg, nasogastric tube, BID  esomeprazole, 40 mg, nasogastric tube,  Daily  fludrocortisone, 0.1 mg, nasogastric tube, q12h  heparin (porcine), 7,500 Units, subcutaneous, q8h LEILA  hydrocortisone sodium succinate, 50 mg, intravenous, q12h  insulin glargine, 12 Units, subcutaneous, q12h  insulin lispro, 0-5 Units, subcutaneous, TID with meals  meropenem, 1 g, intravenous, q12h  midodrine, 20 mg, orogastric tube, q8h  oxygen, , inhalation, Continuous - Inhalation  phytonadione, 10 mg, intravenous, Daily  polyethylene glycol, 17 g, oral, Once  polyethylene glycol, 17 g, oral, Daily  sodium phosphate, 15 mmol, intravenous, Once      Continuous medications  lactated Ringer's, 5 mL/hr, Last Rate: Stopped (04/21/24 0000)  norepinephrine, 0.01-0.05 mcg/kg/min (Dosing Weight), Last Rate: 0.05 mcg/kg/min (04/21/24 1040)  PrismaSol 4/2.5, 3,000 mL/hr, Last Rate: 3,000 mL/hr (04/20/24 0314)      PRN medications  PRN medications: acetaminophen, alteplase, alteplase, calcium gluconate, calcium gluconate, dextrose, glucagon, heparin, heparin, labetaloL, magnesium sulfate, magnesium sulfate, midodrine, oxyCODONE, oxyCODONE, sodium chloride     Assessment   Jasno Hollins 63 y.o. 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 Hawkins and Dr. Smith on 3/5/24. Postoperative course c/b arrest requiring CPR with ROSC after TPA for assumed large PE on 3/20. Increased swelling at flap site appreciated overnight 3/20-3/21 and pt returned to OR for exploration of R flap site, evacuation of hematoma, suture ligation of multiple bleeding vessel sites in gluteal area and wound closure with Dr. Smith on 3/21. Pt has remained in ICU for continued critical care evaluation and management postoperatively. Returned to OR 4/11 with plastic surgery for R posterior thigh/gluteal region I&D with tissue advancement/complex closure  and application of incisional wound vac. Overnight on 4/18, pt with interval c/f bleeding recurrence at R thigh/gluteal region surgical site. S/P I&D of right hip with evacuation of hematoma, closure and incisional wound vac replacement with Dr. Smith on evening of 4/18.     Plan/Recommendations  - Appreciate ICU further evaluation into clinical status change today (marginal temperature, rise in WBC count, lactic and pressor requirement with somnolence on exam)  - Maintain incisional wound vac to R posterior thigh wound site per plastic surgery x14 (5/2) days post-op         ·  Settings: -50 mmHg low continuous suction        ·  Monitor/record vac canister output [id number]       ·  Notify plastic surgery with any concerns of leak or obstruction  - Continue ANDERSON drain care to x3 drains present at R thigh flap reconstruction site      ·  Strip drain tubing TID and PRN     ·  Monitor and record output q8h      ·  Keep drain sites C/D/I      ·  Notify plastics if ANDERSON drain output exceeds > 300 ml/hr (continuing to monitor closely)   - Recommend considering hold of additional doses of ppx SQ heparin for now from plastics perspective        ·   INR 1.9 this AM prior to administration of heparin        ·   Follow up resume pending further clinical course/progression   - Avoid pressure application or positioning onto flap site or incisions at R upper leg   - Recommend patient be positioned off of R side as able to prevent flap compression/wound breakdown   - Continue clinitron fluidized air mattress  - Plastic Surgery will continue to follow     Patient and plan discussed with Dr. Smiht.     Gracie Smith PA-C  Plastic and Reconstructive Surgery   Pager #[phone]  Team phones: [phone]

## 2024-04-22 ENCOUNTER — APPOINTMENT (OUTPATIENT)
Dept: RADIOLOGY | Facility: HOSPITAL | Age: 64
DRG: 003 | End: 2024-04-22
Payer: COMMERCIAL

## 2024-04-22 ENCOUNTER — APPOINTMENT (OUTPATIENT)
Dept: VASCULAR MEDICINE | Facility: HOSPITAL | Age: 64
DRG: 003 | End: 2024-04-22
Payer: COMMERCIAL

## 2024-04-22 LAB
ABO GROUP (TYPE) IN BLOOD: NORMAL
ALBUMIN SERPL BCP-MCNC: 3.5 G/DL (ref 3.4–5)
ALBUMIN SERPL BCP-MCNC: 3.7 G/DL (ref 3.4–5)
ALP SERPL-CCNC: 139 U/L (ref 33–136)
ALP SERPL-CCNC: 148 U/L (ref 33–136)
ALT SERPL W P-5'-P-CCNC: 8 U/L (ref 10–52)
ALT SERPL W P-5'-P-CCNC: 8 U/L (ref 10–52)
ANION GAP BLDA CALCULATED.4IONS-SCNC: 11 MMO/L (ref 10–25)
ANION GAP BLDA CALCULATED.4IONS-SCNC: 12 MMO/L (ref 10–25)
ANION GAP BLDA CALCULATED.4IONS-SCNC: 13 MMO/L (ref 10–25)
ANION GAP BLDA CALCULATED.4IONS-SCNC: 14 MMO/L (ref 10–25)
ANION GAP BLDA CALCULATED.4IONS-SCNC: 15 MMO/L (ref 10–25)
ANION GAP SERPL CALC-SCNC: 16 MMOL/L (ref 10–20)
ANION GAP SERPL CALC-SCNC: 17 MMOL/L (ref 10–20)
ANTIBODY SCREEN: NORMAL
APTT PPP: 40 SECONDS (ref 27–38)
APTT PPP: 41 SECONDS (ref 27–38)
AST SERPL W P-5'-P-CCNC: 59 U/L (ref 9–39)
AST SERPL W P-5'-P-CCNC: 67 U/L (ref 9–39)
BASE EXCESS BLDA CALC-SCNC: -0.6 MMOL/L (ref -2–3)
BASE EXCESS BLDA CALC-SCNC: -1.4 MMOL/L (ref -2–3)
BASE EXCESS BLDA CALC-SCNC: -2.7 MMOL/L (ref -2–3)
BASE EXCESS BLDA CALC-SCNC: -3.4 MMOL/L (ref -2–3)
BASE EXCESS BLDA CALC-SCNC: 0 MMOL/L (ref -2–3)
BILIRUB DIRECT SERPL-MCNC: 15.3 MG/DL (ref 0–0.3)
BILIRUB DIRECT SERPL-MCNC: 16.2 MG/DL (ref 0–0.3)
BILIRUB SERPL-MCNC: 22.1 MG/DL (ref 0–1.2)
BILIRUB SERPL-MCNC: 23.1 MG/DL (ref 0–1.2)
BLOOD EXPIRATION DATE: NORMAL
BODY TEMPERATURE: 37 DEGREES CELSIUS
BUN SERPL-MCNC: 36 MG/DL (ref 6–23)
BUN SERPL-MCNC: 40 MG/DL (ref 6–23)
CA-I BLD-SCNC: 1.09 MMOL/L (ref 1.1–1.33)
CA-I BLD-SCNC: 1.15 MMOL/L (ref 1.1–1.33)
CA-I BLDA-SCNC: 1.12 MMOL/L (ref 1.1–1.33)
CA-I BLDA-SCNC: 1.13 MMOL/L (ref 1.1–1.33)
CA-I BLDA-SCNC: 1.13 MMOL/L (ref 1.1–1.33)
CA-I BLDA-SCNC: 1.14 MMOL/L (ref 1.1–1.33)
CA-I BLDA-SCNC: 1.19 MMOL/L (ref 1.1–1.33)
CALCIUM SERPL-MCNC: 8.1 MG/DL (ref 8.6–10.6)
CALCIUM SERPL-MCNC: 8.8 MG/DL (ref 8.6–10.6)
CHLORIDE BLDA-SCNC: 102 MMOL/L (ref 98–107)
CHLORIDE BLDA-SCNC: 102 MMOL/L (ref 98–107)
CHLORIDE BLDA-SCNC: 103 MMOL/L (ref 98–107)
CHLORIDE BLDA-SCNC: 103 MMOL/L (ref 98–107)
CHLORIDE BLDA-SCNC: 105 MMOL/L (ref 98–107)
CHLORIDE SERPL-SCNC: 100 MMOL/L (ref 98–107)
CHLORIDE SERPL-SCNC: 99 MMOL/L (ref 98–107)
CO2 SERPL-SCNC: 24 MMOL/L (ref 21–32)
CO2 SERPL-SCNC: 24 MMOL/L (ref 21–32)
CREAT SERPL-MCNC: 1.11 MG/DL (ref 0.5–1.3)
CREAT SERPL-MCNC: 1.25 MG/DL (ref 0.5–1.3)
DISPENSE STATUS: NORMAL
EGFRCR SERPLBLD CKD-EPI 2021: 65 ML/MIN/1.73M*2
EGFRCR SERPLBLD CKD-EPI 2021: 75 ML/MIN/1.73M*2
ERYTHROCYTE [DISTWIDTH] IN BLOOD BY AUTOMATED COUNT: 19.8 % (ref 11.5–14.5)
ERYTHROCYTE [DISTWIDTH] IN BLOOD BY AUTOMATED COUNT: 19.9 % (ref 11.5–14.5)
GLUCOSE BLD MANUAL STRIP-MCNC: 138 MG/DL (ref 74–99)
GLUCOSE BLD MANUAL STRIP-MCNC: 180 MG/DL (ref 74–99)
GLUCOSE BLD MANUAL STRIP-MCNC: 185 MG/DL (ref 74–99)
GLUCOSE BLD MANUAL STRIP-MCNC: 186 MG/DL (ref 74–99)
GLUCOSE BLD MANUAL STRIP-MCNC: 190 MG/DL (ref 74–99)
GLUCOSE BLD MANUAL STRIP-MCNC: 220 MG/DL (ref 74–99)
GLUCOSE BLDA-MCNC: 180 MG/DL (ref 74–99)
GLUCOSE BLDA-MCNC: 212 MG/DL (ref 74–99)
GLUCOSE BLDA-MCNC: 225 MG/DL (ref 74–99)
GLUCOSE BLDA-MCNC: 241 MG/DL (ref 74–99)
GLUCOSE BLDA-MCNC: 244 MG/DL (ref 74–99)
GLUCOSE SERPL-MCNC: 204 MG/DL (ref 74–99)
GLUCOSE SERPL-MCNC: 244 MG/DL (ref 74–99)
HCO3 BLDA-SCNC: 19.7 MMOL/L (ref 22–26)
HCO3 BLDA-SCNC: 21.2 MMOL/L (ref 22–26)
HCO3 BLDA-SCNC: 22 MMOL/L (ref 22–26)
HCO3 BLDA-SCNC: 22.6 MMOL/L (ref 22–26)
HCO3 BLDA-SCNC: 22.6 MMOL/L (ref 22–26)
HCT VFR BLD AUTO: 23.7 % (ref 41–52)
HCT VFR BLD AUTO: 24.5 % (ref 41–52)
HCT VFR BLD EST: 23 % (ref 41–52)
HCT VFR BLD EST: 25 % (ref 41–52)
HCT VFR BLD EST: 25 % (ref 41–52)
HCT VFR BLD EST: 29 % (ref 41–52)
HCT VFR BLD EST: 30 % (ref 41–52)
HGB BLD-MCNC: 8.5 G/DL (ref 13.5–17.5)
HGB BLD-MCNC: 8.9 G/DL (ref 13.5–17.5)
HGB BLDA-MCNC: 10.1 G/DL (ref 13.5–17.5)
HGB BLDA-MCNC: 7.8 G/DL (ref 13.5–17.5)
HGB BLDA-MCNC: 8.4 G/DL (ref 13.5–17.5)
HGB BLDA-MCNC: 8.4 G/DL (ref 13.5–17.5)
HGB BLDA-MCNC: 9.7 G/DL (ref 13.5–17.5)
INHALED O2 CONCENTRATION: 40 %
INHALED O2 CONCENTRATION: 40 %
INHALED O2 CONCENTRATION: 50 %
INR PPP: 1.2 (ref 0.9–1.1)
INR PPP: 1.4 (ref 0.9–1.1)
LACTATE BLDA-SCNC: 1.2 MMOL/L (ref 0.4–2)
LACTATE BLDA-SCNC: 2 MMOL/L (ref 0.4–2)
LACTATE BLDA-SCNC: 2.1 MMOL/L (ref 0.4–2)
LACTATE BLDA-SCNC: 2.1 MMOL/L (ref 0.4–2)
LACTATE BLDA-SCNC: 2.3 MMOL/L (ref 0.4–2)
MAGNESIUM SERPL-MCNC: 2.52 MG/DL (ref 1.6–2.4)
MAGNESIUM SERPL-MCNC: 2.6 MG/DL (ref 1.6–2.4)
MCH RBC QN AUTO: 28.9 PG (ref 26–34)
MCH RBC QN AUTO: 29.3 PG (ref 26–34)
MCHC RBC AUTO-ENTMCNC: 35.9 G/DL (ref 32–36)
MCHC RBC AUTO-ENTMCNC: 36.3 G/DL (ref 32–36)
MCV RBC AUTO: 80 FL (ref 80–100)
MCV RBC AUTO: 82 FL (ref 80–100)
NRBC BLD-RTO: 0.5 /100 WBCS (ref 0–0)
NRBC BLD-RTO: 1.3 /100 WBCS (ref 0–0)
OXYHGB MFR BLDA: 93.2 % (ref 94–98)
OXYHGB MFR BLDA: 95 % (ref 94–98)
OXYHGB MFR BLDA: 95.6 % (ref 94–98)
OXYHGB MFR BLDA: 95.9 % (ref 94–98)
OXYHGB MFR BLDA: 95.9 % (ref 94–98)
PCO2 BLDA: 27 MM HG (ref 38–42)
PCO2 BLDA: 29 MM HG (ref 38–42)
PCO2 BLDA: 31 MM HG (ref 38–42)
PCO2 BLDA: 31 MM HG (ref 38–42)
PCO2 BLDA: 32 MM HG (ref 38–42)
PH BLDA: 7.43 PH (ref 7.38–7.42)
PH BLDA: 7.46 PH (ref 7.38–7.42)
PH BLDA: 7.47 PH (ref 7.38–7.42)
PH BLDA: 7.47 PH (ref 7.38–7.42)
PH BLDA: 7.5 PH (ref 7.38–7.42)
PHOSPHATE SERPL-MCNC: 2 MG/DL (ref 2.5–4.9)
PHOSPHATE SERPL-MCNC: 2.5 MG/DL (ref 2.5–4.9)
PLATELET # BLD AUTO: 137 X10*3/UL (ref 150–450)
PLATELET # BLD AUTO: 144 X10*3/UL (ref 150–450)
PO2 BLDA: 69 MM HG (ref 85–95)
PO2 BLDA: 80 MM HG (ref 85–95)
PO2 BLDA: 82 MM HG (ref 85–95)
PO2 BLDA: 85 MM HG (ref 85–95)
PO2 BLDA: 87 MM HG (ref 85–95)
POTASSIUM BLDA-SCNC: 4.1 MMOL/L (ref 3.5–5.3)
POTASSIUM BLDA-SCNC: 4.7 MMOL/L (ref 3.5–5.3)
POTASSIUM BLDA-SCNC: 4.7 MMOL/L (ref 3.5–5.3)
POTASSIUM BLDA-SCNC: 4.8 MMOL/L (ref 3.5–5.3)
POTASSIUM BLDA-SCNC: 5 MMOL/L (ref 3.5–5.3)
POTASSIUM SERPL-SCNC: 4.7 MMOL/L (ref 3.5–5.3)
POTASSIUM SERPL-SCNC: 4.8 MMOL/L (ref 3.5–5.3)
PRODUCT BLOOD TYPE: 5100
PRODUCT CODE: NORMAL
PROT SERPL-MCNC: 5 G/DL (ref 6.4–8.2)
PROT SERPL-MCNC: 5.3 G/DL (ref 6.4–8.2)
PROTHROMBIN TIME: 13.6 SECONDS (ref 9.8–12.8)
PROTHROMBIN TIME: 15.3 SECONDS (ref 9.8–12.8)
RBC # BLD AUTO: 2.9 X10*6/UL (ref 4.5–5.9)
RBC # BLD AUTO: 3.08 X10*6/UL (ref 4.5–5.9)
RH FACTOR (ANTIGEN D): NORMAL
SAO2 % BLDA: 96 % (ref 94–100)
SAO2 % BLDA: 97 % (ref 94–100)
SAO2 % BLDA: 99 % (ref 94–100)
SODIUM BLDA-SCNC: 132 MMOL/L (ref 136–145)
SODIUM BLDA-SCNC: 132 MMOL/L (ref 136–145)
SODIUM BLDA-SCNC: 133 MMOL/L (ref 136–145)
SODIUM BLDA-SCNC: 134 MMOL/L (ref 136–145)
SODIUM BLDA-SCNC: 134 MMOL/L (ref 136–145)
SODIUM SERPL-SCNC: 135 MMOL/L (ref 136–145)
SODIUM SERPL-SCNC: 135 MMOL/L (ref 136–145)
UNIT ABO: NORMAL
UNIT NUMBER: NORMAL
UNIT RH: NORMAL
UNIT VOLUME: 288
UNIT VOLUME: 350
WBC # BLD AUTO: 10.3 X10*3/UL (ref 4.4–11.3)
WBC # BLD AUTO: 9.5 X10*3/UL (ref 4.4–11.3)
XM INTEP: NORMAL

## 2024-04-22 PROCEDURE — 2500000004 HC RX 250 GENERAL PHARMACY W/ HCPCS (ALT 636 FOR OP/ED)

## 2024-04-22 PROCEDURE — 2500000002 HC RX 250 W HCPCS SELF ADMINISTERED DRUGS (ALT 637 FOR MEDICARE OP, ALT 636 FOR OP/ED): Performed by: NURSE PRACTITIONER

## 2024-04-22 PROCEDURE — 71045 X-RAY EXAM CHEST 1 VIEW: CPT | Performed by: RADIOLOGY

## 2024-04-22 PROCEDURE — 87070 CULTURE OTHR SPECIMN AEROBIC: CPT

## 2024-04-22 PROCEDURE — 2500000001 HC RX 250 WO HCPCS SELF ADMINISTERED DRUGS (ALT 637 FOR MEDICARE OP)

## 2024-04-22 PROCEDURE — 37799 UNLISTED PX VASCULAR SURGERY: CPT | Performed by: STUDENT IN AN ORGANIZED HEALTH CARE EDUCATION/TRAINING PROGRAM

## 2024-04-22 PROCEDURE — 82330 ASSAY OF CALCIUM: CPT | Performed by: STUDENT IN AN ORGANIZED HEALTH CARE EDUCATION/TRAINING PROGRAM

## 2024-04-22 PROCEDURE — 2500000004 HC RX 250 GENERAL PHARMACY W/ HCPCS (ALT 636 FOR OP/ED): Mod: JZ | Performed by: STUDENT IN AN ORGANIZED HEALTH CARE EDUCATION/TRAINING PROGRAM

## 2024-04-22 PROCEDURE — 2500000005 HC RX 250 GENERAL PHARMACY W/O HCPCS: Performed by: STUDENT IN AN ORGANIZED HEALTH CARE EDUCATION/TRAINING PROGRAM

## 2024-04-22 PROCEDURE — 90945 DIALYSIS ONE EVALUATION: CPT | Performed by: INTERNAL MEDICINE

## 2024-04-22 PROCEDURE — 2500000004 HC RX 250 GENERAL PHARMACY W/ HCPCS (ALT 636 FOR OP/ED): Performed by: NURSE PRACTITIONER

## 2024-04-22 PROCEDURE — 84075 ASSAY ALKALINE PHOSPHATASE: CPT | Performed by: STUDENT IN AN ORGANIZED HEALTH CARE EDUCATION/TRAINING PROGRAM

## 2024-04-22 PROCEDURE — 84100 ASSAY OF PHOSPHORUS: CPT | Performed by: STUDENT IN AN ORGANIZED HEALTH CARE EDUCATION/TRAINING PROGRAM

## 2024-04-22 PROCEDURE — 83735 ASSAY OF MAGNESIUM: CPT | Performed by: STUDENT IN AN ORGANIZED HEALTH CARE EDUCATION/TRAINING PROGRAM

## 2024-04-22 PROCEDURE — 94003 VENT MGMT INPAT SUBQ DAY: CPT

## 2024-04-22 PROCEDURE — 84132 ASSAY OF SERUM POTASSIUM: CPT | Performed by: STUDENT IN AN ORGANIZED HEALTH CARE EDUCATION/TRAINING PROGRAM

## 2024-04-22 PROCEDURE — 2020000001 HC ICU ROOM DAILY

## 2024-04-22 PROCEDURE — 76705 ECHO EXAM OF ABDOMEN: CPT | Performed by: RADIOLOGY

## 2024-04-22 PROCEDURE — 2500000004 HC RX 250 GENERAL PHARMACY W/ HCPCS (ALT 636 FOR OP/ED): Performed by: STUDENT IN AN ORGANIZED HEALTH CARE EDUCATION/TRAINING PROGRAM

## 2024-04-22 PROCEDURE — 82947 ASSAY GLUCOSE BLOOD QUANT: CPT

## 2024-04-22 PROCEDURE — 2500000001 HC RX 250 WO HCPCS SELF ADMINISTERED DRUGS (ALT 637 FOR MEDICARE OP): Performed by: NURSE PRACTITIONER

## 2024-04-22 PROCEDURE — 99232 SBSQ HOSP IP/OBS MODERATE 35: CPT

## 2024-04-22 PROCEDURE — 97530 THERAPEUTIC ACTIVITIES: CPT | Mod: GP | Performed by: PHYSICAL THERAPIST

## 2024-04-22 PROCEDURE — 93970 EXTREMITY STUDY: CPT

## 2024-04-22 PROCEDURE — 93970 EXTREMITY STUDY: CPT | Performed by: SURGERY

## 2024-04-22 PROCEDURE — P9047 ALBUMIN (HUMAN), 25%, 50ML: HCPCS | Mod: JZ | Performed by: NURSE PRACTITIONER

## 2024-04-22 PROCEDURE — 99232 SBSQ HOSP IP/OBS MODERATE 35: CPT | Performed by: OTOLARYNGOLOGY

## 2024-04-22 PROCEDURE — 86901 BLOOD TYPING SEROLOGIC RH(D): CPT | Performed by: NURSE PRACTITIONER

## 2024-04-22 PROCEDURE — 71045 X-RAY EXAM CHEST 1 VIEW: CPT

## 2024-04-22 PROCEDURE — 2500000001 HC RX 250 WO HCPCS SELF ADMINISTERED DRUGS (ALT 637 FOR MEDICARE OP): Performed by: STUDENT IN AN ORGANIZED HEALTH CARE EDUCATION/TRAINING PROGRAM

## 2024-04-22 PROCEDURE — 82248 BILIRUBIN DIRECT: CPT | Performed by: STUDENT IN AN ORGANIZED HEALTH CARE EDUCATION/TRAINING PROGRAM

## 2024-04-22 PROCEDURE — 97535 SELF CARE MNGMENT TRAINING: CPT | Mod: GO

## 2024-04-22 PROCEDURE — 76705 ECHO EXAM OF ABDOMEN: CPT

## 2024-04-22 PROCEDURE — 85027 COMPLETE CBC AUTOMATED: CPT | Performed by: STUDENT IN AN ORGANIZED HEALTH CARE EDUCATION/TRAINING PROGRAM

## 2024-04-22 PROCEDURE — 90937 HEMODIALYSIS REPEATED EVAL: CPT

## 2024-04-22 PROCEDURE — 97530 THERAPEUTIC ACTIVITIES: CPT | Mod: GO

## 2024-04-22 PROCEDURE — 99291 CRITICAL CARE FIRST HOUR: CPT | Performed by: EMERGENCY MEDICINE

## 2024-04-22 PROCEDURE — 85610 PROTHROMBIN TIME: CPT | Performed by: STUDENT IN AN ORGANIZED HEALTH CARE EDUCATION/TRAINING PROGRAM

## 2024-04-22 RX ORDER — VANCOMYCIN HYDROCHLORIDE 1 G/200ML
7.5 INJECTION, SOLUTION INTRAVENOUS EVERY 12 HOURS
Status: DISCONTINUED | OUTPATIENT
Start: 2024-04-22 | End: 2024-04-23 | Stop reason: ALTCHOICE

## 2024-04-22 RX ORDER — HEPARIN SODIUM 5000 [USP'U]/ML
5000 INJECTION, SOLUTION INTRAVENOUS; SUBCUTANEOUS EVERY 8 HOURS
Status: DISCONTINUED | OUTPATIENT
Start: 2024-04-22 | End: 2024-04-26

## 2024-04-22 RX ORDER — INSULIN LISPRO 100 [IU]/ML
0-20 INJECTION, SOLUTION INTRAVENOUS; SUBCUTANEOUS EVERY 4 HOURS
Status: DISCONTINUED | OUTPATIENT
Start: 2024-04-22 | End: 2024-04-26

## 2024-04-22 RX ORDER — LANOLIN ALCOHOL/MO/W.PET/CERES
500 CREAM (GRAM) TOPICAL 3 TIMES DAILY
Status: DISCONTINUED | OUTPATIENT
Start: 2024-04-22 | End: 2024-04-22

## 2024-04-22 RX ORDER — LANOLIN ALCOHOL/MO/W.PET/CERES
500 CREAM (GRAM) TOPICAL EVERY 8 HOURS
Status: DISCONTINUED | OUTPATIENT
Start: 2024-04-22 | End: 2024-04-26

## 2024-04-22 RX ADMIN — HYDROCORTISONE SODIUM SUCCINATE 50 MG: 100 INJECTION, POWDER, FOR SOLUTION INTRAMUSCULAR; INTRAVENOUS at 05:55

## 2024-04-22 RX ADMIN — INSULIN LISPRO 4 UNITS: 100 INJECTION, SOLUTION INTRAVENOUS; SUBCUTANEOUS at 16:53

## 2024-04-22 RX ADMIN — INSULIN LISPRO 6 UNITS: 100 INJECTION, SOLUTION INTRAVENOUS; SUBCUTANEOUS at 04:26

## 2024-04-22 RX ADMIN — INSULIN LISPRO 4 UNITS: 100 INJECTION, SOLUTION INTRAVENOUS; SUBCUTANEOUS at 19:46

## 2024-04-22 RX ADMIN — VANCOMYCIN HYDROCHLORIDE 1000 MG: 1 INJECTION, SOLUTION INTRAVENOUS at 12:42

## 2024-04-22 RX ADMIN — CALCIUM CHLORIDE, MAGNESIUM CHLORIDE, DEXTROSE MONOHYDRATE, LACTIC ACID, SODIUM CHLORIDE, SODIUM BICARBONATE AND POTASSIUM CHLORIDE 3000 ML/HR: 3.68; 3.05; 22; 5.4; 6.46; 3.09; .314 INJECTION INTRAVENOUS at 06:21

## 2024-04-22 RX ADMIN — INSULIN GLARGINE 12 UNITS: 100 INJECTION, SOLUTION SUBCUTANEOUS at 20:33

## 2024-04-22 RX ADMIN — MICAFUNGIN 100 MG: 20 INJECTION, POWDER, LYOPHILIZED, FOR SOLUTION INTRAVENOUS at 16:42

## 2024-04-22 RX ADMIN — HEPARIN SODIUM 5000 UNITS: 5000 INJECTION INTRAVENOUS; SUBCUTANEOUS at 12:41

## 2024-04-22 RX ADMIN — CALCIUM CHLORIDE, MAGNESIUM CHLORIDE, DEXTROSE MONOHYDRATE, LACTIC ACID, SODIUM CHLORIDE, SODIUM BICARBONATE AND POTASSIUM CHLORIDE 3000 ML/HR: 3.68; 3.05; 22; 5.4; 6.46; 3.09; .314 INJECTION INTRAVENOUS at 11:48

## 2024-04-22 RX ADMIN — INSULIN LISPRO 3 UNITS: 100 INJECTION, SOLUTION INTRAVENOUS; SUBCUTANEOUS at 08:28

## 2024-04-22 RX ADMIN — MIDODRINE HYDROCHLORIDE 20 MG: 10 TABLET ORAL at 00:48

## 2024-04-22 RX ADMIN — CALCIUM CHLORIDE, MAGNESIUM CHLORIDE, DEXTROSE MONOHYDRATE, LACTIC ACID, SODIUM CHLORIDE, SODIUM BICARBONATE AND POTASSIUM CHLORIDE 3000 ML/HR: 3.68; 3.05; 22; 5.4; 6.46; 3.09; .314 INJECTION INTRAVENOUS at 11:47

## 2024-04-22 RX ADMIN — CALCIUM GLUCONATE 1 G: 20 INJECTION, SOLUTION INTRAVENOUS at 04:20

## 2024-04-22 RX ADMIN — HYDROMORPHONE HYDROCHLORIDE 0.2 MG: 1 INJECTION, SOLUTION INTRAMUSCULAR; INTRAVENOUS; SUBCUTANEOUS at 14:41

## 2024-04-22 RX ADMIN — Medication 50 PERCENT: at 08:00

## 2024-04-22 RX ADMIN — INSULIN LISPRO 4 UNITS: 100 INJECTION, SOLUTION INTRAVENOUS; SUBCUTANEOUS at 12:41

## 2024-04-22 RX ADMIN — VANCOMYCIN HYDROCHLORIDE 1000 MG: 1 INJECTION, SOLUTION INTRAVENOUS at 23:38

## 2024-04-22 RX ADMIN — ALBUMIN HUMAN 12.5 G: 0.25 SOLUTION INTRAVENOUS at 05:55

## 2024-04-22 RX ADMIN — INSULIN LISPRO 6 UNITS: 100 INJECTION, SOLUTION INTRAVENOUS; SUBCUTANEOUS at 01:22

## 2024-04-22 RX ADMIN — HYDROCORTISONE SODIUM SUCCINATE 50 MG: 100 INJECTION, POWDER, FOR SOLUTION INTRAMUSCULAR; INTRAVENOUS at 23:38

## 2024-04-22 RX ADMIN — MIDODRINE HYDROCHLORIDE 20 MG: 10 TABLET ORAL at 16:43

## 2024-04-22 RX ADMIN — THIAMINE HCL TAB 100 MG 500 MG: 100 TAB at 16:53

## 2024-04-22 RX ADMIN — HYDROCORTISONE SODIUM SUCCINATE 50 MG: 100 INJECTION, POWDER, FOR SOLUTION INTRAMUSCULAR; INTRAVENOUS at 00:48

## 2024-04-22 RX ADMIN — MEROPENEM 1 G: 1 INJECTION, POWDER, FOR SOLUTION INTRAVENOUS at 19:45

## 2024-04-22 RX ADMIN — PHYTONADIONE 10 MG: 10 INJECTION, EMULSION INTRAMUSCULAR; INTRAVENOUS; SUBCUTANEOUS at 08:28

## 2024-04-22 RX ADMIN — FLUDROCORTISONE ACETATE 0.1 MG: 0.1 TABLET ORAL at 04:19

## 2024-04-22 RX ADMIN — HYDROCORTISONE SODIUM SUCCINATE 50 MG: 100 INJECTION, POWDER, FOR SOLUTION INTRAMUSCULAR; INTRAVENOUS at 17:01

## 2024-04-22 RX ADMIN — THIAMINE HCL TAB 100 MG 500 MG: 100 TAB at 10:59

## 2024-04-22 RX ADMIN — CALCIUM CHLORIDE, MAGNESIUM CHLORIDE, DEXTROSE MONOHYDRATE, LACTIC ACID, SODIUM CHLORIDE, SODIUM BICARBONATE AND POTASSIUM CHLORIDE 3000 ML/HR: 3.68; 3.05; 22; 5.4; 6.46; 3.09; .314 INJECTION INTRAVENOUS at 11:46

## 2024-04-22 RX ADMIN — ESOMEPRAZOLE MAGNESIUM 40 MG: 40 FOR SUSPENSION ORAL at 05:55

## 2024-04-22 RX ADMIN — MIDODRINE HYDROCHLORIDE 20 MG: 10 TABLET ORAL at 08:30

## 2024-04-22 RX ADMIN — HEPARIN SODIUM 5000 UNITS: 5000 INJECTION INTRAVENOUS; SUBCUTANEOUS at 19:46

## 2024-04-22 RX ADMIN — MEROPENEM 1 G: 1 INJECTION, POWDER, FOR SOLUTION INTRAVENOUS at 08:31

## 2024-04-22 RX ADMIN — INSULIN GLARGINE 12 UNITS: 100 INJECTION, SOLUTION SUBCUTANEOUS at 08:30

## 2024-04-22 RX ADMIN — CALCIUM CHLORIDE, MAGNESIUM CHLORIDE, DEXTROSE MONOHYDRATE, LACTIC ACID, SODIUM CHLORIDE, SODIUM BICARBONATE AND POTASSIUM CHLORIDE 3000 ML/HR: 3.68; 3.05; 22; 5.4; 6.46; 3.09; .314 INJECTION INTRAVENOUS at 00:50

## 2024-04-22 RX ADMIN — ALBUMIN HUMAN 12.5 G: 0.25 SOLUTION INTRAVENOUS at 00:48

## 2024-04-22 RX ADMIN — FLUDROCORTISONE ACETATE 0.1 MG: 0.1 TABLET ORAL at 16:42

## 2024-04-22 RX ADMIN — HYDROCORTISONE SODIUM SUCCINATE 50 MG: 100 INJECTION, POWDER, FOR SOLUTION INTRAMUSCULAR; INTRAVENOUS at 12:41

## 2024-04-22 ASSESSMENT — COGNITIVE AND FUNCTIONAL STATUS - GENERAL
PERSONAL GROOMING: TOTAL
MOBILITY SCORE: 6
TOILETING: TOTAL
PERSONAL GROOMING: TOTAL
DRESSING REGULAR LOWER BODY CLOTHING: TOTAL
MOVING TO AND FROM BED TO CHAIR: TOTAL
MOVING FROM LYING ON BACK TO SITTING ON SIDE OF FLAT BED WITH BEDRAILS: TOTAL
DRESSING REGULAR LOWER BODY CLOTHING: TOTAL
DRESSING REGULAR UPPER BODY CLOTHING: TOTAL
WALKING IN HOSPITAL ROOM: TOTAL
DAILY ACTIVITIY SCORE: 6
WALKING IN HOSPITAL ROOM: TOTAL
STANDING UP FROM CHAIR USING ARMS: TOTAL
DRESSING REGULAR UPPER BODY CLOTHING: TOTAL
EATING MEALS: TOTAL
TOILETING: TOTAL
CLIMB 3 TO 5 STEPS WITH RAILING: TOTAL
MOVING TO AND FROM BED TO CHAIR: TOTAL
HELP NEEDED FOR BATHING: TOTAL
HELP NEEDED FOR BATHING: TOTAL
CLIMB 3 TO 5 STEPS WITH RAILING: TOTAL
STANDING UP FROM CHAIR USING ARMS: TOTAL
EATING MEALS: TOTAL
MOBILITY SCORE: 6
MOVING FROM LYING ON BACK TO SITTING ON SIDE OF FLAT BED WITH BEDRAILS: TOTAL
DAILY ACTIVITIY SCORE: 6
TURNING FROM BACK TO SIDE WHILE IN FLAT BAD: TOTAL
TURNING FROM BACK TO SIDE WHILE IN FLAT BAD: TOTAL

## 2024-04-22 ASSESSMENT — PAIN - FUNCTIONAL ASSESSMENT
PAIN_FUNCTIONAL_ASSESSMENT: 0-10
PAIN_FUNCTIONAL_ASSESSMENT: 0-10
PAIN_FUNCTIONAL_ASSESSMENT: CPOT (CRITICAL CARE PAIN OBSERVATION TOOL)
PAIN_FUNCTIONAL_ASSESSMENT: 0-10
PAIN_FUNCTIONAL_ASSESSMENT: CPOT (CRITICAL CARE PAIN OBSERVATION TOOL)
PAIN_FUNCTIONAL_ASSESSMENT: CPOT (CRITICAL CARE PAIN OBSERVATION TOOL)

## 2024-04-22 ASSESSMENT — PAIN SCALES - GENERAL
PAINLEVEL_OUTOF10: 1
PAINLEVEL_OUTOF10: 0 - NO PAIN
PAINLEVEL_OUTOF10: 4
PAINLEVEL_OUTOF10: 0 - NO PAIN
PAINLEVEL_OUTOF10: 0 - NO PAIN

## 2024-04-22 ASSESSMENT — ACTIVITIES OF DAILY LIVING (ADL): HOME_MANAGEMENT_TIME_ENTRY: 10

## 2024-04-22 NOTE — PROGRESS NOTES
Occupational Therapy    Occupational Therapy Treatment    Name: Jason Hollins  MRN: [id number]  : 1960  Date: 24  Room:       Time Calculation  Start Time: 1441  Stop Time: 1526  Time Calculation (min): 45 min    Assessment:  End of Session Communication: Bedside nurse  End of Session Patient Position: Alarm off, not on at start of session, Bed, 4 rail up    Plan:  Treatment Interventions: ADL retraining, Functional transfer training, Patient/family training  OT Frequency: 4 times per week  OT Discharge Recommendations: Moderate intensity level of continued care  Equipment Recommended upon Discharge:  (none)  OT Recommended Transfer Status:  (tbd; medically unsafe to assess on OT re-eval on 3/28.)  OT - OK to Discharge: Yes (when medically appropriate)    Subjective   General:  OT Last Visit  OT Received On: 24  Co-Treatment: PT  Co-Treatment Reason: AMPAC<10, vent dependent with intent to mobilize  Prior to Session Communication: Bedside nurse, Physician  Patient Position Received: Bed, 4 rail up, Alarm off, not on at start of session (sand bed)  Family/Caregiver Present: Yes  Caregiver Feedback: Wife and niece at bedside, supportive  General Comment: Pt supine in bed upon arrival, intermittently alert and gesturing to communicate. Increased fatigue noted at end of session. VC AC 50% FiO2, RR 20, PEEP 10, .03 levo, CVVH. Since last OT session, pt  Patient bleeding into RLE given increased ANDERSON drain output and non-incrementing Hgb despite transfusion. Return to OR for washout . On   DVT study of UE and LE that showed acute occlusive deep vein thrombosis visualized in the right soleal vein and acute occlusive superficial venous thrombosis visualized in the mid right cephalic vein. Cleared by medical team to participate in session.     Precautions:  LE Weight Bearing Status: Right Non-Weight Bearing  Medical Precautions: Fall precautions, Oxygen therapy device and L/min  Precautions  "Comment: MAP 65-90,  no other mobility restrictions following most recent OR 4/11 per plastics team, contact precautions    Vitals:  Vital Signs  Heart Rate: 92 (post: 81)  Resp: (!) 34 (post: 29)  SpO2: 97 % (post: 98)  BP: 130/63 (post: 105/50)    Lines/Tubes/Drains:  CVC 04/18/24 Triple lumen Left Internal jugular (Active)   Number of days: 4       Arterial Line 04/05/24 Left Radial (Active)   Number of days: 17       Surgical Airway Shiley Proximal 6 (Active)   Number of days: 20       Urethral Catheter 16 Fr. (Active)   Number of days: 5       Closed/Suction Drain 2 Proximal;Right;Anterior Thigh Bulb 19 Fr. (Active)   Number of days: 32       Closed/Suction Drain 1 Right;Anterior Thigh Bulb 15 Fr. (Active)   Number of days: 32       Closed/Suction Drain 3 Proximal;Right;Anterior Thigh (Active)   Number of days: 32       NG/OG/Feeding Tube Other (Comment) Right nostril (Active)   Number of days: 20       Cognition:  Overall Cognitive Status: Impaired  Arousal/Alertness: Inconsistent responses to stimuli  Orientation Level:  (Oriented to self and hospital only with choices)  Following Commands:  (Decreased command following with activity noted. Overall followed >50% simple one step commands)  Cognition Comments: Pt intermittently able to use \"thumbs up\" and head nods to communicate.  Processing Speed: Delayed    Pain Assessment:  Pain Assessment  Pain Score:  (no apparent signs of pain noted)     Objective   Activities of Daily Living:      Toileting  Toileting Level of Assistance: Dependent  Where Assessed: Bed level    Bed Mobility/Transfers:     Bed Mobility  Bed Mobility: Yes  Bed Mobility 1  Bed Mobility 1: Supine to sitting, Sitting to supine  Level of Assistance 1: Dependent (x4 assist)  Bed Mobility Comments 1: Draw sheet, HOB elevated, increased time  Bed Mobility 2  Bed Mobility  2: Rolling right, Rolling left  Level of Assistance 2: Dependent (x2 assist)  Bed Mobility Comments 2: draw sheet    "   Therapy/Activity:      Therapeutic Activity  Therapeutic Activity Performed: Yes  Therapeutic Activity 1: Pt sat EOB ~7 minutes wth dependent x2 assist. While seated EOB, pt engaged in embrace with wife with assist. Pt also engaged in BLE ROM exercises.  Therapeutic Activity 2: Increased time for room set up and skilled line management.     Outcome Measures:  [facility] Daily Activity  Putting on and taking off regular lower body clothing: Total  Bathing (including washing, rinsing, drying): Total  Putting on and taking off regular upper body clothing: Total  Toileting, which includes using toilet, bedpan or urinal: Total  Taking care of personal grooming such as brushing teeth: Total  Eating Meals: Total  Daily Activity - Total Score: 6     Education Documentation  Body Mechanics, taught by Christelle Garces, OT at 4/22/2024  4:02 PM.  Learner: Patient  Readiness: Acceptance  Method: Explanation, Demonstration  Response: Needs Reinforcement    Precautions, taught by Christelle Garces, OT at 4/22/2024  4:02 PM.  Learner: Patient  Readiness: Acceptance  Method: Explanation, Demonstration  Response: Needs Reinforcement    Education Comments  No comments found.        Goals:  Encounter Problems       Encounter Problems (Active)       ADLs       Patient will perform UB and LB bathing  with stand by assist level of assistance and AE. (Not met)       Start:  03/19/24    Expected End:  04/09/24    Resolved:  03/28/24    Updated to: Patient will perform UB and LB bathing  with MAX assist level of assistance and AE.    Update reason: change in functional status         Patient with complete upper body dressing with independent level  (Not met)       Start:  03/19/24    Expected End:  04/09/24    Resolved:  03/28/24    Updated to: Patient with complete upper body dressing with MAX A    Update reason: change in functional status         Patient with complete lower body dressing with minimal assist  level of  assistance donning and doffing all LE clothes  with PRN adaptive equipment  (Not met)       Start:  03/19/24    Expected End:  04/09/24    Resolved:  03/28/24    Updated to: Patient with complete lower body dressing with MAX assist  level of assistance donning and doffing all LE clothes  with PRN adaptive equipment    Update reason: change in functional status         Patient will complete daily grooming tasks  with independent level  (Not met)       Start:  03/19/24    Expected End:  04/09/24    Resolved:  03/28/24    Updated to: Patient will complete daily grooming tasks with MAX A.    Update reason: change in functional status         Patient will complete toileting including hygiene clothing management/hygiene with moderate assist level of assistance and LRD. (Not met)       Start:  03/19/24    Expected End:  04/09/24    Resolved:  03/28/24    Updated to: Patient will complete toileting including hygiene clothing management/hygiene with MAX assist level of assistance and LRD.    Update reason: change in functional status         Patient will perform UB and LB bathing  with MAX assist level of assistance and AE. (Progressing)       Start:  03/28/24    Expected End:  05/06/24                Patient with complete upper body dressing with MAX A (Progressing)       Start:  03/28/24    Expected End:  05/06/24                Patient with complete lower body dressing with MAX assist  level of assistance donning and doffing all LE clothes  with PRN adaptive equipment (Progressing)       Start:  03/28/24    Expected End:  05/06/24                Patient will complete daily grooming tasks with MAX A. (Progressing)       Start:  03/28/24    Expected End:  05/06/24                Patient will complete toileting including hygiene clothing management/hygiene with MAX assist level of assistance and LRD. (Progressing)       Start:  03/28/24    Expected End:  05/06/24                   EXERCISE/STRENGTHENING       Patient with  increase BUE to WFL strength. (Not met)       Start:  03/19/24    Expected End:  04/09/24    Resolved:  03/28/24    Updated to: Patient with increase BUE to 2/5 strength.    Update reason: change in functional status         Patient with increase BUE to 2/5 strength. (Progressing)       Start:  03/28/24    Expected End:  05/06/24                   TRANSFERS       Patient will perform bed mobility moderate assist level of assistance and bed rails in order to improve safety and independence with mobility (Not met)       Start:  03/19/24    Expected End:  04/09/24    Resolved:  03/28/24    Updated to: Patient will perform bed mobility moderate assist x2 level of assistance and bed rails in order to improve safety and independence with mobility    Update reason: change in functional status         Patient will complete functional transfer with least restrictive device with moderate assist level of assistance. (Not met)       Start:  03/19/24    Expected End:  04/09/24    Resolved:  03/28/24    Updated to: Patient will complete functional transfer with least restrictive device with moderate assist x2 level of assistance.    Update reason: change in functional status         Patient will perform bed mobility moderate assist x2 level of assistance and bed rails in order to improve safety and independence with mobility (Progressing)       Start:  03/28/24    Expected End:  05/06/24                Patient will complete functional transfer with least restrictive device with moderate assist x2 level of assistance. (Progressing)       Start:  03/28/24    Expected End:  05/06/24 04/22/24 at 4:04 PM   Christelle Garces, OT   [phone]

## 2024-04-22 NOTE — PROCEDURES
[facility] note    Seen and examined on [facility]. Labs and events reviewed   Vascular access:  [facility] non-TDC   BFR :200 ml/min  Filter: M150  Replacement solution: Prismasol 4K/2.5Ca  Replacement Rate: 3000 ml/hour  1000/pump/1000/filter/1000      No change in [facility] prescription for next 24 h  Check Ca, phos, Mg every 12 h and replete as needed per ICU protocol  Fluid removal: per ICU team       Intake/Output Summary (Last 24 hours) at 4/22/2024 1649  Last data filed at 4/22/2024 1600  Gross per 24 hour   Intake 2314.06 ml   Output 2803 ml   Net -488.94 ml     Heart Rate:  []   Temp:  [36.2 °C (97.2 °F)-37.6 °C (99.7 °F)]   Resp:  [0-35]   BP: ()/()   SpO2:  [93 %-100 %]     Scheduled medications  docusate sodium, 100 mg, nasogastric tube, BID  esomeprazole, 40 mg, nasogastric tube, Daily  fludrocortisone, 0.1 mg, nasogastric tube, q12h  heparin (porcine), 5,000 Units, subcutaneous, q8h  hydrocortisone sodium succinate, 50 mg, intravenous, q6h  insulin glargine, 12 Units, subcutaneous, q12h  insulin lispro, 0-20 Units, subcutaneous, q4h  meropenem, 1 g, intravenous, q12h  micafungin, 100 mg, intravenous, q24h  midodrine, 20 mg, orogastric tube, q8h  oxygen, , inhalation, Continuous - Inhalation  phytonadione, 10 mg, intravenous, Daily  polyethylene glycol, 17 g, oral, Daily  thiamine, 500 mg, nasogastric tube, q8h  vancomycin, 7.5 mg/kg, intravenous, q12h      Continuous medications  lactated Ringer's, 5 mL/hr, Last Rate: 5 mL/hr (04/22/24 0700)  norepinephrine, 0.01-0.05 mcg/kg/min (Dosing Weight), Last Rate: 0.03 mcg/kg/min (04/22/24 0700)  PrismaSol 4/2.5, 3,000 mL/hr, Last Rate: 3,000 mL/hr (04/22/24 1148)      PRN medications  PRN medications: acetaminophen, alteplase, alteplase, calcium gluconate, calcium gluconate, dextrose, glucagon, heparin, heparin, HYDROmorphone, HYDROmorphone, labetaloL, magnesium sulfate, magnesium sulfate, midodrine, sodium chloride, vancomycin    Recent Results (from the past  24 hour(s))   POCT GLUCOSE    Collection Time: 04/21/24  8:30 PM   Result Value Ref Range    POCT Glucose 198 (H) 74 - 99 mg/dL   Calcium, ionized    Collection Time: 04/22/24  1:11 AM   Result Value Ref Range    POCT Calcium, Ionized 1.09 (L) 1.1 - 1.33 mmol/L   CBC    Collection Time: 04/22/24  1:11 AM   Result Value Ref Range    WBC 10.3 4.4 - 11.3 x10*3/uL    nRBC 1.3 (H) 0.0 - 0.0 /100 WBCs    RBC 2.90 (L) 4.50 - 5.90 x10*6/uL    Hemoglobin 8.5 (L) 13.5 - 17.5 g/dL    Hematocrit 23.7 (L) 41.0 - 52.0 %    MCV 82 80 - 100 fL    MCH 29.3 26.0 - 34.0 pg    MCHC 35.9 32.0 - 36.0 g/dL    RDW 19.8 (H) 11.5 - 14.5 %    Platelets 137 (L) 150 - 450 x10*3/uL   Hepatic function panel    Collection Time: 04/22/24  1:11 AM   Result Value Ref Range    Albumin 3.5 3.4 - 5.0 g/dL    Bilirubin, Total 22.1 (H) 0.0 - 1.2 mg/dL    Bilirubin, Direct 15.3 (H) 0.0 - 0.3 mg/dL    Alkaline Phosphatase 139 (H) 33 - 136 U/L    ALT 8 (L) 10 - 52 U/L    AST 59 (H) 9 - 39 U/L    Total Protein 5.0 (L) 6.4 - 8.2 g/dL   Magnesium    Collection Time: 04/22/24  1:11 AM   Result Value Ref Range    Magnesium 2.52 (H) 1.60 - 2.40 mg/dL   Basic Metabolic Panel    Collection Time: 04/22/24  1:11 AM   Result Value Ref Range    Glucose 244 (H) 74 - 99 mg/dL    Sodium 135 (L) 136 - 145 mmol/L    Potassium 4.7 3.5 - 5.3 mmol/L    Chloride 100 98 - 107 mmol/L    Bicarbonate 24 21 - 32 mmol/L    Anion Gap 16 10 - 20 mmol/L    Urea Nitrogen 40 (H) 6 - 23 mg/dL    Creatinine 1.25 0.50 - 1.30 mg/dL    eGFR 65 >60 mL/min/1.73m*2    Calcium 8.1 (L) 8.6 - 10.6 mg/dL   Phosphorus    Collection Time: 04/22/24  1:11 AM   Result Value Ref Range    Phosphorus 2.5 2.5 - 4.9 mg/dL   Blood Gas Arterial Full Panel    Collection Time: 04/22/24  1:12 AM   Result Value Ref Range    POCT pH, Arterial 7.47 (H) 7.38 - 7.42 pH    POCT pCO2, Arterial 31 (L) 38 - 42 mm Hg    POCT pO2, Arterial 69 (L) 85 - 95 mm Hg    POCT SO2, Arterial 96 94 - 100 %    POCT Oxy Hemoglobin,  Arterial 93.2 (L) 94.0 - 98.0 %    POCT Hematocrit Calculated, Arterial 30.0 (L) 41.0 - 52.0 %    POCT Sodium, Arterial 132 (L) 136 - 145 mmol/L    POCT Potassium, Arterial 4.7 3.5 - 5.3 mmol/L    POCT Chloride, Arterial 102 98 - 107 mmol/L    POCT Ionized Calcium, Arterial 1.14 1.10 - 1.33 mmol/L    POCT Glucose, Arterial 244 (H) 74 - 99 mg/dL    POCT Lactate, Arterial 2.3 (H) 0.4 - 2.0 mmol/L    POCT Base Excess, Arterial -0.6 -2.0 - 3.0 mmol/L    POCT HCO3 Calculated, Arterial 22.6 22.0 - 26.0 mmol/L    POCT Hemoglobin, Arterial 10.1 (L) 13.5 - 17.5 g/dL    POCT Anion Gap, Arterial 12 10 - 25 mmo/L    Patient Temperature 37.0 degrees Celsius    FiO2 50 %   Coagulation Screen    Collection Time: 04/22/24  1:12 AM   Result Value Ref Range    Protime 15.3 (H) 9.8 - 12.8 seconds    INR 1.4 (H) 0.9 - 1.1    aPTT 41 (H) 27 - 38 seconds   POCT GLUCOSE    Collection Time: 04/22/24  4:25 AM   Result Value Ref Range    POCT Glucose 220 (H) 74 - 99 mg/dL   Type and screen    Collection Time: 04/22/24  8:17 AM   Result Value Ref Range    ABO TYPE O     Rh TYPE POS     ANTIBODY SCREEN NEG    Blood Gas Arterial Full Panel    Collection Time: 04/22/24  8:19 AM   Result Value Ref Range    POCT pH, Arterial 7.47 (H) 7.38 - 7.42 pH    POCT pCO2, Arterial 27 (L) 38 - 42 mm Hg    POCT pO2, Arterial 85 85 - 95 mm Hg    POCT SO2, Arterial 99 94 - 100 %    POCT Oxy Hemoglobin, Arterial 95.9 94.0 - 98.0 %    POCT Hematocrit Calculated, Arterial 23.0 (L) 41.0 - 52.0 %    POCT Sodium, Arterial 134 (L) 136 - 145 mmol/L    POCT Potassium, Arterial 4.1 3.5 - 5.3 mmol/L    POCT Chloride, Arterial 105 98 - 107 mmol/L    POCT Ionized Calcium, Arterial 1.13 1.10 - 1.33 mmol/L    POCT Glucose, Arterial 180 (H) 74 - 99 mg/dL    POCT Lactate, Arterial 2.0 0.4 - 2.0 mmol/L    POCT Base Excess, Arterial -3.4 (L) -2.0 - 3.0 mmol/L    POCT HCO3 Calculated, Arterial 19.7 (L) 22.0 - 26.0 mmol/L    POCT Hemoglobin, Arterial 7.8 (L) 13.5 - 17.5 g/dL     POCT Anion Gap, Arterial 13 10 - 25 mmo/L    Patient Temperature 37.0 degrees Celsius    FiO2 50 %   POCT GLUCOSE    Collection Time: 04/22/24  8:19 AM   Result Value Ref Range    POCT Glucose 185 (H) 74 - 99 mg/dL   Tissue/Wound Culture/Smear    Collection Time: 04/22/24 11:42 AM    Specimen: Other (specify in comments); Tissue/Biopsy   Result Value Ref Range    Gram Stain (2+) Few Polymorphonuclear leukocytes (A)     Gram Stain (3+) Moderate Mixed Gram positive bacteria (A)    POCT GLUCOSE    Collection Time: 04/22/24 12:03 PM   Result Value Ref Range    POCT Glucose 190 (H) 74 - 99 mg/dL   Calcium, ionized    Collection Time: 04/22/24 12:44 PM   Result Value Ref Range    POCT Calcium, Ionized 1.15 1.1 - 1.33 mmol/L   CBC    Collection Time: 04/22/24 12:44 PM   Result Value Ref Range    WBC 9.5 4.4 - 11.3 x10*3/uL    nRBC 0.5 (H) 0.0 - 0.0 /100 WBCs    RBC 3.08 (L) 4.50 - 5.90 x10*6/uL    Hemoglobin 8.9 (L) 13.5 - 17.5 g/dL    Hematocrit 24.5 (L) 41.0 - 52.0 %    MCV 80 80 - 100 fL    MCH 28.9 26.0 - 34.0 pg    MCHC 36.3 (H) 32.0 - 36.0 g/dL    RDW 19.9 (H) 11.5 - 14.5 %    Platelets 144 (L) 150 - 450 x10*3/uL   Coagulation Screen    Collection Time: 04/22/24 12:44 PM   Result Value Ref Range    Protime 13.6 (H) 9.8 - 12.8 seconds    INR 1.2 (H) 0.9 - 1.1    aPTT 40 (H) 27 - 38 seconds   Hepatic function panel    Collection Time: 04/22/24 12:44 PM   Result Value Ref Range    Albumin 3.7 3.4 - 5.0 g/dL    Bilirubin, Total 23.1 (H) 0.0 - 1.2 mg/dL    Bilirubin, Direct 16.2 (H) 0.0 - 0.3 mg/dL    Alkaline Phosphatase 148 (H) 33 - 136 U/L    ALT 8 (L) 10 - 52 U/L    AST 67 (H) 9 - 39 U/L    Total Protein 5.3 (L) 6.4 - 8.2 g/dL   Magnesium    Collection Time: 04/22/24 12:44 PM   Result Value Ref Range    Magnesium 2.60 (H) 1.60 - 2.40 mg/dL   Basic Metabolic Panel    Collection Time: 04/22/24 12:44 PM   Result Value Ref Range    Glucose 204 (H) 74 - 99 mg/dL    Sodium 135 (L) 136 - 145 mmol/L    Potassium 4.8 3.5 - 5.3  mmol/L    Chloride 99 98 - 107 mmol/L    Bicarbonate 24 21 - 32 mmol/L    Anion Gap 17 10 - 20 mmol/L    Urea Nitrogen 36 (H) 6 - 23 mg/dL    Creatinine 1.11 0.50 - 1.30 mg/dL    eGFR 75 >60 mL/min/1.73m*2    Calcium 8.8 8.6 - 10.6 mg/dL   Phosphorus    Collection Time: 04/22/24 12:44 PM   Result Value Ref Range    Phosphorus 2.0 (L) 2.5 - 4.9 mg/dL   Blood Gas Arterial Full Panel    Collection Time: 04/22/24 12:46 PM   Result Value Ref Range    POCT pH, Arterial 7.50 (H) 7.38 - 7.42 pH    POCT pCO2, Arterial 29 (L) 38 - 42 mm Hg    POCT pO2, Arterial 80 (L) 85 - 95 mm Hg    POCT SO2, Arterial 97 94 - 100 %    POCT Oxy Hemoglobin, Arterial 95.0 94.0 - 98.0 %    POCT Hematocrit Calculated, Arterial 29.0 (L) 41.0 - 52.0 %    POCT Sodium, Arterial 132 (L) 136 - 145 mmol/L    POCT Potassium, Arterial 4.7 3.5 - 5.3 mmol/L    POCT Chloride, Arterial 103 98 - 107 mmol/L    POCT Ionized Calcium, Arterial 1.19 1.10 - 1.33 mmol/L    POCT Glucose, Arterial 212 (H) 74 - 99 mg/dL    POCT Lactate, Arterial 1.2 0.4 - 2.0 mmol/L    POCT Base Excess, Arterial 0.0 -2.0 - 3.0 mmol/L    POCT HCO3 Calculated, Arterial 22.6 22.0 - 26.0 mmol/L    POCT Hemoglobin, Arterial 9.7 (L) 13.5 - 17.5 g/dL    POCT Anion Gap, Arterial 11 10 - 25 mmo/L    Patient Temperature 37.0 degrees Celsius    FiO2 50 %

## 2024-04-22 NOTE — PROGRESS NOTES
Department of Plastic and Reconstructive Surgery  Daily Progress Note    Patient Name: Jason Hollins  MRN: [id number]  Date:  04/22/24     Subjective   Found resting in bed in surgical ICU with family members at the bedside. Pt less somnolent on exam today, responsive to verbal stimuli. Incisional wound vac intact to R hip surgical site, without issue overnight. ANDERSON drain outputs appropriately down-trending (611 -> 411 -> 246 ml dark bloody serous drainage total over the past 72 hrs). Given yesterday's increase in WBC count, lactic, pressor requirement and marginal temperature, pt initiated on broader spectrum abx, blood cxs, upper and lower extremity US plus contrasted CT head, chest, abdomen and pelvis obtained. White count is improving, blood cultures NGTD. He has remained afebrile.     Objective   Physical Exam   Constitutional: Appears critically ill. Lying in bed in surgical ICU, more responsive to verbal stimuli today.   ENMT: MMM, dobhoff in R nare.  Cardiovascular: RRR on telemetry monitor.  Respiratory/Thorax: Trach to vent.  Gastrointestinal: Abdomen soft, protuberant.   Musculoskeletal: Able to squeeze bilateral hands, wiggles toes on the left, minimal movement on RLE, effort to move foot observed.   Extremities: Generalized pitting edema. Right thigh edematous, edema improved S/P hematoma evacuation. Visible portion of flap recipient site and adjacent anterior R thigh incisions appear stable. Posterior flap incision line and posterior thigh/gluteal region incision dressed with incisional wound vac, maintaining low continuous suction at -50mmHg, no alarms for leak, obstruction or malfunction, no output in collecting canister. ANDERSON drain x 3 right upper leg with appropriate dark red, bloody drainage since OR.  Neurological: Off sedation, able to respond to commands with head nod and thumbs up  Skin: Edematous, jaundiced, warm.     Current Medications  Scheduled medications  docusate sodium, 100 mg, nasogastric  tube, BID  esomeprazole, 40 mg, nasogastric tube, Daily  fludrocortisone, 0.1 mg, nasogastric tube, q12h  heparin (porcine), 5,000 Units, subcutaneous, q8h  hydrocortisone sodium succinate, 50 mg, intravenous, q6h  insulin glargine, 12 Units, subcutaneous, q12h  insulin lispro, 0-20 Units, subcutaneous, q4h  meropenem, 1 g, intravenous, q12h  micafungin, 100 mg, intravenous, q24h  midodrine, 20 mg, orogastric tube, q8h  oxygen, , inhalation, Continuous - Inhalation  phytonadione, 10 mg, intravenous, Daily  polyethylene glycol, 17 g, oral, Daily  thiamine, 500 mg, nasogastric tube, q8h  vancomycin, 7.5 mg/kg, intravenous, q12h      Continuous medications  lactated Ringer's, 5 mL/hr, Last Rate: 5 mL/hr (04/22/24 0700)  norepinephrine, 0.01-0.05 mcg/kg/min (Dosing Weight), Last Rate: 0.03 mcg/kg/min (04/22/24 0700)  PrismaSol 4/2.5, 3,000 mL/hr, Last Rate: 3,000 mL/hr (04/22/24 1148)      PRN medications  PRN medications: acetaminophen, alteplase, alteplase, calcium gluconate, calcium gluconate, dextrose, glucagon, heparin, heparin, HYDROmorphone, HYDROmorphone, labetaloL, magnesium sulfate, magnesium sulfate, midodrine, sodium chloride, vancomycin     Assessment   Jason Hollins 63 y.o. 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 Hawkins and Dr. Smith on 3/5/24. Postoperative course c/b arrest requiring CPR with ROSC after TPA for assumed large PE on 3/20. Increased swelling at flap site appreciated overnight 3/20-3/21 and pt returned to OR for exploration of R flap site, evacuation of hematoma, suture ligation of multiple bleeding vessel sites in gluteal area and wound closure with Dr. Smith on 3/21. Pt has remained in ICU for continued critical care evaluation and management postoperatively. Returned to OR 4/11 with  plastic surgery for R posterior thigh/gluteal region I&D with tissue advancement/complex closure and application of incisional wound vac. Overnight on 4/18, pt with interval c/f bleeding recurrence at R thigh/gluteal region surgical site. S/P I&D of right hip with evacuation of hematoma, closure and incisional wound vac replacement with Dr. Smith on evening of 4/18.     Plan/Recommendations  - Appreciate ICU further evaluation into clinical status change yesterday (marginal temperature, rise in WBC count, lactic and pressor requirement with somnolence on exam)       ·  White count downtrended to 10.3 this AM       ·  Blood cultures NGTD       ·  Pt remaining afebrile   - 4/22 Vascular US notable for superficial thromboses of mid cephalic vein and soleal vein       ·  Plastics recommend careful anticoagulation due to recent history of rebleeding       ·  Heparin 5000 Q8 with close monitoring of flap site and drain output        ·  INR 1.4 and PT 15.3 early this AM, continue to trend  - Maintain incisional wound vac to R posterior thigh wound site per plastic surgery x14 (5/2) days post-op         ·  Settings: -50 mmHg low continuous suction        ·  Monitor/record vac canister output [id number]       ·  Notify plastic surgery with any concerns of leak or obstruction  - Continue ANDERSON drain care to x3 drains present at R thigh flap reconstruction site      ·  Strip drain tubing TID and PRN     ·  Monitor and record output q8h      ·  Keep drain sites C/D/I      ·  Notify plastics if ANDERSON drain output exceeds > 300 ml/hr (continuing to monitor closely)   - Avoid pressure application or positioning onto flap site or incisions at R upper leg   - Recommend patient be positioned off of R side as able to prevent flap compression/wound breakdown   - Continue clinitron fluidized air mattress  - Plastic Surgery will continue to follow     Patient and plan discussed with Dr. Smith.     Peggy Dubois PA-C  Plastic and  Reconstructive Surgery   Pager #[phone]  Team phone: [phone]

## 2024-04-22 NOTE — PROGRESS NOTES
Jason Hollins is a 63 y.o. male on day 48 of admission presenting with Soft tissue sarcoma (Multi).    Subjective   Had low grade fever overnight with temp. Of 37.6. and noted to have greenish discharge around his tracheostomy. In addition, he had CT head that was unremarkable and DVT study of UE and LE that showed acute occlusive deep vein thrombosis visualized in the right soleal vein and acute occlusive superficial venous thrombosis visualized in the mid right cephalic vein.     Objective     Physical Exam  Constitutional:       General: He is not in acute distress.     Appearance: He is obese. He is ill-appearing.   HENT:      Nose:      Comments: Dobhoff in R nare, bridled in place.      Mouth/Throat:      Mouth: Mucous membranes are moist.   Eyes:      General: Scleral icterus present.      Extraocular Movements: Extraocular movements intact.      Pupils: Pupils are equal, round, and reactive to light.   Neck:      Comments: LIJ trialysis in place, dressing c/d/i.   Cardiovascular:      Rate and Rhythm: Normal rate and regular rhythm.      Pulses:           Radial pulses are 1+ on the right side and 1+ on the left side.        Dorsalis pedis pulses are detected w/ Doppler on the right side and detected w/ Doppler on the left side.        Posterior tibial pulses are detected w/ Doppler on the right side and detected w/ Doppler on the left side.      Heart sounds: Normal heart sounds.   Pulmonary:      Comments: Diffuse inspiratory rales and diminished bases bilaterally. Currently on VC/AC  Abdominal:      General: Bowel sounds are normal. There is distension.      Tenderness: There is no abdominal tenderness.      Comments: Obese abdomen, firm, anasarca.   Genitourinary:     Penis: Swelling present.       Comments: Scrotal edema. Marino in place with minimal dark sharee UOP.  Musculoskeletal:      Right lower leg: 3+ Edema present.      Left lower leg: 3+ Edema present.      Comments: 3+ pitting edema noted  "bilaterally in upper and lower extremities. R thigh with mild swelling (improved from prior day) and with surgical incision sites clean, dry, intact. 3 ANDERSON drains and wound vac with minimal output this morning.   Skin:     General: Skin is warm and dry.      Coloration: Skin is jaundiced.      Comments: ANDERSON drain x2 and wound vac to R gluteal flap site.   Neurological:      Mental Status: He is alert.      Comments: Alert, nodding appropriately to yes/no questions. Following simple commands with BUE and LLE, no movement with RLE.   Psychiatric:      Comments: Calm and cooperative.       Last Recorded Vitals  Blood pressure 132/63, pulse 85, temperature 36.5 °C (97.7 °F), temperature source Temporal, resp. rate (!) 32, height 1.778 m (5' 10\"), weight 135 kg (298 lb 1 oz), SpO2 98%.  Intake/Output last 3 Shifts:  I/O last 3 completed shifts:  In: 4269.9 (31.6 mL/kg) [I.V.:379.9 (2.8 mL/kg); Blood:400; NG/GT:1740; IV Piggyback:1750]  Out: 3113 (23 mL/kg) [Urine:15 (0 mL/kg/hr); Drains:436; Other:2662]  Weight: 135.2 kg     Relevant Results  Scheduled medications  docusate sodium, 100 mg, nasogastric tube, BID  esomeprazole, 40 mg, nasogastric tube, Daily  fludrocortisone, 0.1 mg, nasogastric tube, q12h  [Held by provider] heparin (porcine), 7,500 Units, subcutaneous, q8h LEILA  hydrocortisone sodium succinate, 50 mg, intravenous, q6h  insulin glargine, 12 Units, subcutaneous, q12h  insulin lispro, 0-20 Units, subcutaneous, q4h  meropenem, 1 g, intravenous, q12h  micafungin, 100 mg, intravenous, q24h  midodrine, 20 mg, orogastric tube, q8h  oxygen, , inhalation, Continuous - Inhalation  phytonadione, 10 mg, intravenous, Daily  polyethylene glycol, 17 g, oral, Daily  thiamine, 500 mg, nasogastric tube, q8h  vancomycin, 7.5 mg/kg, intravenous, q12h      Continuous medications  lactated Ringer's, 5 mL/hr, Last Rate: Stopped (04/21/24 0000)  norepinephrine, 0.01-0.05 mcg/kg/min (Dosing Weight), Last Rate: 0.03 mcg/kg/min " (04/22/24 0545)  PrismaSol 4/2.5, 3,000 mL/hr, Last Rate: 3,000 mL/hr (04/22/24 0621)      PRN medications  PRN medications: acetaminophen, alteplase, alteplase, calcium gluconate, calcium gluconate, dextrose, glucagon, heparin, heparin, HYDROmorphone, HYDROmorphone, labetaloL, magnesium sulfate, magnesium sulfate, midodrine, sodium chloride, vancomycin  Results for orders placed or performed during the hospital encounter of 03/05/24 (from the past 24 hour(s))   POCT GLUCOSE   Result Value Ref Range    POCT Glucose 247 (H) 74 - 99 mg/dL   Magnesium   Result Value Ref Range    Magnesium 2.63 (H) 1.60 - 2.40 mg/dL   Hepatic function panel   Result Value Ref Range    Albumin 3.9 3.4 - 5.0 g/dL    Bilirubin, Total 22.6 (H) 0.0 - 1.2 mg/dL    Bilirubin, Direct 15.6 (H) 0.0 - 0.3 mg/dL    Alkaline Phosphatase 148 (H) 33 - 136 U/L    ALT 6 (L) 10 - 52 U/L    AST 65 (H) 9 - 39 U/L    Total Protein 5.9 (L) 6.4 - 8.2 g/dL   Coagulation Screen   Result Value Ref Range    Protime 19.4 (H) 9.8 - 12.8 seconds    INR 1.7 (H) 0.9 - 1.1    aPTT 43 (H) 27 - 38 seconds   CBC   Result Value Ref Range    WBC 12.9 (H) 4.4 - 11.3 x10*3/uL    nRBC 1.9 (H) 0.0 - 0.0 /100 WBCs    RBC 2.81 (L) 4.50 - 5.90 x10*6/uL    Hemoglobin 8.0 (L) 13.5 - 17.5 g/dL    Hematocrit 23.2 (L) 41.0 - 52.0 %    MCV 83 80 - 100 fL    MCH 28.5 26.0 - 34.0 pg    MCHC 34.5 32.0 - 36.0 g/dL    RDW 20.5 (H) 11.5 - 14.5 %    Platelets 164 150 - 450 x10*3/uL   Basic Metabolic Panel   Result Value Ref Range    Glucose 235 (H) 74 - 99 mg/dL    Sodium 135 (L) 136 - 145 mmol/L    Potassium 4.7 3.5 - 5.3 mmol/L    Chloride 99 98 - 107 mmol/L    Bicarbonate 23 21 - 32 mmol/L    Anion Gap 18 10 - 20 mmol/L    Urea Nitrogen 41 (H) 6 - 23 mg/dL    Creatinine 1.17 0.50 - 1.30 mg/dL    eGFR 70 >60 mL/min/1.73m*2    Calcium 8.9 8.6 - 10.6 mg/dL   Phosphorus   Result Value Ref Range    Phosphorus 2.0 (L) 2.5 - 4.9 mg/dL   CBC and Auto Differential   Result Value Ref Range    WBC  12.9 (H) 4.4 - 11.3 x10*3/uL    nRBC 1.9 (H) 0.0 - 0.0 /100 WBCs    RBC 2.81 (L) 4.50 - 5.90 x10*6/uL    Hemoglobin 8.0 (L) 13.5 - 17.5 g/dL    Hematocrit 23.2 (L) 41.0 - 52.0 %    MCV 83 80 - 100 fL    MCH 28.5 26.0 - 34.0 pg    MCHC 34.5 32.0 - 36.0 g/dL    RDW 20.5 (H) 11.5 - 14.5 %    Platelets 164 150 - 450 x10*3/uL    Immature Granulocytes %, Automated 3.0 (H) 0.0 - 0.9 %    Immature Granulocytes Absolute, Automated 0.39 0.00 - 0.70 x10*3/uL   Manual Differential   Result Value Ref Range    Neutrophils %, Manual 86.7 40.0 - 80.0 %    Bands %, Manual 8.3 0.0 - 5.0 %    Lymphocytes %, Manual 2.5 13.0 - 44.0 %    Monocytes %, Manual 2.5 2.0 - 10.0 %    Eosinophils %, Manual 0.0 0.0 - 6.0 %    Basophils %, Manual 0.0 0.0 - 2.0 %    Seg Neutrophils Absolute, Manual 11.18 (H) 1.20 - 7.00 x10*3/uL    Bands Absolute, Manual 1.07 (H) 0.00 - 0.70 x10*3/uL    Lymphocytes Absolute, Manual 0.32 (L) 1.20 - 4.80 x10*3/uL    Monocytes Absolute, Manual 0.32 0.10 - 1.00 x10*3/uL    Eosinophils Absolute, Manual 0.00 0.00 - 0.70 x10*3/uL    Basophils Absolute, Manual 0.00 0.00 - 0.10 x10*3/uL    Total Cells Counted 120     Neutrophils Absolute, Manual 12.25 (H) 1.20 - 7.70 x10*3/uL    RBC Morphology See Below     Polychromasia Mild     Hypochromia Mild     Target Cells Few     Stomatocytes Few     Basophilic Stippling Present    Blood Gas Arterial Full Panel   Result Value Ref Range    POCT pH, Arterial 7.46 (H) 7.38 - 7.42 pH    POCT pCO2, Arterial 33 (L) 38 - 42 mm Hg    POCT pO2, Arterial 71 (L) 85 - 95 mm Hg    POCT SO2, Arterial 97 94 - 100 %    POCT Oxy Hemoglobin, Arterial 93.9 (L) 94.0 - 98.0 %    POCT Hematocrit Calculated, Arterial 25.0 (L) 41.0 - 52.0 %    POCT Sodium, Arterial 133 (L) 136 - 145 mmol/L    POCT Potassium, Arterial 4.9 3.5 - 5.3 mmol/L    POCT Chloride, Arterial      POCT Ionized Calcium, Arterial 1.17 1.10 - 1.33 mmol/L    POCT Glucose, Arterial 242 (H) 74 - 99 mg/dL    POCT Lactate, Arterial 2.9 (H)  0.4 - 2.0 mmol/L    POCT Base Excess, Arterial -0.1 -2.0 - 3.0 mmol/L    POCT HCO3 Calculated, Arterial 23.5 22.0 - 26.0 mmol/L    POCT Hemoglobin, Arterial 8.4 (L) 13.5 - 17.5 g/dL    POCT Anion Gap, Arterial      Patient Temperature 37.0 degrees Celsius    FiO2 50 %   Blood Culture    Specimen: Peripheral Venipuncture; Blood culture   Result Value Ref Range    Blood Culture Loaded on Instrument - Culture in progress    Blood Culture    Specimen: Peripheral Venipuncture; Blood culture   Result Value Ref Range    Blood Culture Loaded on Instrument - Culture in progress    POCT GLUCOSE   Result Value Ref Range    POCT Glucose 286 (H) 74 - 99 mg/dL   CBC   Result Value Ref Range    WBC 10.4 4.4 - 11.3 x10*3/uL    nRBC 2.4 (H) 0.0 - 0.0 /100 WBCs    RBC 2.86 (L) 4.50 - 5.90 x10*6/uL    Hemoglobin 8.4 (L) 13.5 - 17.5 g/dL    Hematocrit 22.9 (L) 41.0 - 52.0 %    MCV 80 80 - 100 fL    MCH 29.4 26.0 - 34.0 pg    MCHC 36.7 (H) 32.0 - 36.0 g/dL    RDW 19.8 (H) 11.5 - 14.5 %    Platelets 147 (L) 150 - 450 x10*3/uL   POCT GLUCOSE   Result Value Ref Range    POCT Glucose 198 (H) 74 - 99 mg/dL   Calcium, ionized   Result Value Ref Range    POCT Calcium, Ionized 1.09 (L) 1.1 - 1.33 mmol/L   CBC   Result Value Ref Range    WBC 10.3 4.4 - 11.3 x10*3/uL    nRBC 1.3 (H) 0.0 - 0.0 /100 WBCs    RBC 2.90 (L) 4.50 - 5.90 x10*6/uL    Hemoglobin 8.5 (L) 13.5 - 17.5 g/dL    Hematocrit 23.7 (L) 41.0 - 52.0 %    MCV 82 80 - 100 fL    MCH 29.3 26.0 - 34.0 pg    MCHC 35.9 32.0 - 36.0 g/dL    RDW 19.8 (H) 11.5 - 14.5 %    Platelets 137 (L) 150 - 450 x10*3/uL   Hepatic function panel   Result Value Ref Range    Albumin 3.5 3.4 - 5.0 g/dL    Bilirubin, Total 22.1 (H) 0.0 - 1.2 mg/dL    Bilirubin, Direct 15.3 (H) 0.0 - 0.3 mg/dL    Alkaline Phosphatase 139 (H) 33 - 136 U/L    ALT 8 (L) 10 - 52 U/L    AST 59 (H) 9 - 39 U/L    Total Protein 5.0 (L) 6.4 - 8.2 g/dL   Magnesium   Result Value Ref Range    Magnesium 2.52 (H) 1.60 - 2.40 mg/dL   Basic  Metabolic Panel   Result Value Ref Range    Glucose 244 (H) 74 - 99 mg/dL    Sodium 135 (L) 136 - 145 mmol/L    Potassium 4.7 3.5 - 5.3 mmol/L    Chloride 100 98 - 107 mmol/L    Bicarbonate 24 21 - 32 mmol/L    Anion Gap 16 10 - 20 mmol/L    Urea Nitrogen 40 (H) 6 - 23 mg/dL    Creatinine 1.25 0.50 - 1.30 mg/dL    eGFR 65 >60 mL/min/1.73m*2    Calcium 8.1 (L) 8.6 - 10.6 mg/dL   Phosphorus   Result Value Ref Range    Phosphorus 2.5 2.5 - 4.9 mg/dL   Blood Gas Arterial Full Panel   Result Value Ref Range    POCT pH, Arterial 7.47 (H) 7.38 - 7.42 pH    POCT pCO2, Arterial 31 (L) 38 - 42 mm Hg    POCT pO2, Arterial 69 (L) 85 - 95 mm Hg    POCT SO2, Arterial 96 94 - 100 %    POCT Oxy Hemoglobin, Arterial 93.2 (L) 94.0 - 98.0 %    POCT Hematocrit Calculated, Arterial 30.0 (L) 41.0 - 52.0 %    POCT Sodium, Arterial 132 (L) 136 - 145 mmol/L    POCT Potassium, Arterial 4.7 3.5 - 5.3 mmol/L    POCT Chloride, Arterial 102 98 - 107 mmol/L    POCT Ionized Calcium, Arterial 1.14 1.10 - 1.33 mmol/L    POCT Glucose, Arterial 244 (H) 74 - 99 mg/dL    POCT Lactate, Arterial 2.3 (H) 0.4 - 2.0 mmol/L    POCT Base Excess, Arterial -0.6 -2.0 - 3.0 mmol/L    POCT HCO3 Calculated, Arterial 22.6 22.0 - 26.0 mmol/L    POCT Hemoglobin, Arterial 10.1 (L) 13.5 - 17.5 g/dL    POCT Anion Gap, Arterial 12 10 - 25 mmo/L    Patient Temperature 37.0 degrees Celsius    FiO2 50 %   Coagulation Screen   Result Value Ref Range    Protime 15.3 (H) 9.8 - 12.8 seconds    INR 1.4 (H) 0.9 - 1.1    aPTT 41 (H) 27 - 38 seconds   POCT GLUCOSE   Result Value Ref Range    POCT Glucose 220 (H) 74 - 99 mg/dL   Blood Gas Arterial Full Panel   Result Value Ref Range    POCT pH, Arterial 7.47 (H) 7.38 - 7.42 pH    POCT pCO2, Arterial 27 (L) 38 - 42 mm Hg    POCT pO2, Arterial 85 85 - 95 mm Hg    POCT SO2, Arterial 99 94 - 100 %    POCT Oxy Hemoglobin, Arterial 95.9 94.0 - 98.0 %    POCT Hematocrit Calculated, Arterial 23.0 (L) 41.0 - 52.0 %    POCT Sodium, Arterial 134  (L) 136 - 145 mmol/L    POCT Potassium, Arterial 4.1 3.5 - 5.3 mmol/L    POCT Chloride, Arterial 105 98 - 107 mmol/L    POCT Ionized Calcium, Arterial 1.13 1.10 - 1.33 mmol/L    POCT Glucose, Arterial 180 (H) 74 - 99 mg/dL    POCT Lactate, Arterial 2.0 0.4 - 2.0 mmol/L    POCT Base Excess, Arterial -3.4 (L) -2.0 - 3.0 mmol/L    POCT HCO3 Calculated, Arterial 19.7 (L) 22.0 - 26.0 mmol/L    POCT Hemoglobin, Arterial 7.8 (L) 13.5 - 17.5 g/dL    POCT Anion Gap, Arterial 13 10 - 25 mmo/L    Patient Temperature 37.0 degrees Celsius    FiO2 50 %   POCT GLUCOSE   Result Value Ref Range    POCT Glucose 185 (H) 74 - 99 mg/dL       Assessment/Plan   Principal Problem:    Soft tissue sarcoma (Multi)  Active Problems:    Acute kidney injury (nontraumatic) (CMS-HCC)    Pulmonary embolus (Multi)    Anemia    Thrombocytopenia (CMS-HCC)    Current chronic use of systemic steroids    Gastroesophageal reflux disease without esophagitis    Extraskeletal myxoid chondrosarcoma (Multi)    Cardiopulmonary arrest (Multi)    Acute respiratory failure with hypoxia (Multi)    Tracheostomy hemorrhage (Multi)    Postoperative wound breakdown, initial encounter    Postoperative wound breakdown, sequela    Hematoma    Jason Hollins is a 63 y.o. 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 Hawkins and Dr. Smith on 3/5/24.  Post operative course was uncomplicated and patient was on RNF until 3/20 for prolonged bed rest to avoid hip flexion to maintain flap integrity.  Patient was on prophylactic lovenox while on bedrest.      3/20: Cardiopulmonary arrest s/p CPR with ROSC after TPA, overnight started on heparin, epo, increased pressor requirements, increased swelling at flap site.      3/21: Hemorrhagic shock, MTP. Firm and large right flap site. Return to  OR s/p Exploration of right thigh wound, Evacuation of hematoma. Suture ligation of multiple bleeding vessel and several points in gluteal area.      4/1: s/p Trach     4/4: return OR with ENT s/p laryngoscopy, esophagoscopy and bronchoscopy, trach revision. Found extensive clot burden in esophagus and stomach as well as in the lungs. Oozing at thyroid bed cauterized. Trach exchanged to new 6.0 prox XLT chrisley. OR findings: blood up glottis and from thyroid bed to airway.     4/9: Patient is medically stable for OR on 4/11/24.  He is appropriately n.p.o. since midnight and has had more DVT prophylaxis held for OR today.     4/18: Patient bleeding into RLE given increased ANDERSON drain output and non-incrementing Hgb despite transfusion. Return to OR for washout.     4/21: - patient more somnolent, increasing WBC, Temp 99.7, Lactate 2.9 this afternoon, and increasing pressor requirements: sending blood culture x 2, resumed vancomycin, reengaged ID, and returned hydrocortisone dosing to q6hr, giving unit PRBC    Plan:  NEURO: History of myxoid chondrosarcoma s/p wide resection sarcoma, sciatic nerve neurolysis of right lower extremity with right gluteal reconstruction, pedicle ALT, vastus lateralus flap, pedicle gluteal and perisformis flap with Dr. Hawkins and Dr. Smith on 3/5/24 s/p cardiopulmonary arrest with ROSC 3/20. CT head 3/22 without acute process. Weaned off cisatracurium and propofol overnight 3/23-3/24. Ketamine weaned off 3/25 and Fentanyl weaned off 3/26 am. Repeat CT Head 3/26 without acute process. MRI Brain 3/30, negative for cerebral infarction or hemorrhage. Neuro exam - following commands except for RLE, tracking, nodding/shaking head to questions.  CT head on 4/22 that was unremarkable.  - ongoing neuro and pain assessments  - Start Thiamine 500 mg TID   - PRN Oxycodone and Tylenol  - PT/OT -> AROM     CV: History of HTN, HLD. Acute cardiopulmonary arrest 2/2 to possible massive PE vs massive STEMI,  received multiple rounds of CPR and one defibrillation.  Sustained ROSC achieved after TPA bolus. On high dose levophed, epinephrine, vaso, angioT2. Plan on 3/21 was for ECMO which was aborted secondary to large hemhorrge at right flap site. Had MTP and taken OR with 5L evacuated. ECHO 3/21: Normal LV, Mod enlarged RV, slight suggestion of a Townsend's sign / RV strain, low normal RV function. ECHO 3/22 with hyperdynamic LV. New onset Afib with RVR overnight 3/22-3/23, dosed with 150mg amio x2, started infusion at 1mg/min thereafter. AT2 weaned off. Amio infusion discontinued 3/25. Lactate downtrending. Vaso and epi weaned off. Remains in NSR. iEpo weaned off 3/27. Repeat TTE 3/29 and 4/2 essentially unchanged. Levo resumed 4/2 evening to continue aggressive fluid removal. Repeat TTE on 4/10 with LVEF 65-70% and RV difficulty to assess. Weaned off levo 4/13. Marginal hypotension 4/20 . Currently on  norepi 0.03 and BP has been stable ON.   - continuous EKG/abp monitoring  - Goal map range 65-90  - continue levophed infusion for fluid removal with CVVH, wean off as tolerated  - continue midodrine 20mg q8h (increased back to 20 mg on 4/20)  - Continue florinef 0.1mg BID   - returned hydrocortisone dosing to q6hr       PULM: Arrived to SICU intubated julio c-CPR. Concern for massive PE. Started on iEpo, currently on 0.05. Hypoxic respiratory failure. Severe ARDS. ETT exchanged 3/23. CT Chest 3/26 with interval JOHN consolidative/ground glass opacity. S/p Tracheostomy on 4/1. S/p trach revision on 4/4. CXR with bilateraly pulmonary edema and pleural effusions R>L. Currently on VC/AC mode, failed CPAP trial today.  - wean vent as tolerated, maintain SpO2 >90%, PaO2 >60  - additional bronchial hygiene as indicated  - Daily CXR  - ABG prn     ENT: Had epistaxis 3/23, completed afrin bid x3 days. S/p trach on 4/1. Bleeding from trach site 4/2 am, packing placed by ENT. Repeat episode of bloody tracheal secretions 4/3 through  this am. Taken back to OR with ENT 4/4 s/p trach revision. Noted to have greenish discharge around his tracheostomy ON and ENT is seeing the patient today.  - Culture ordered for drainage around of the trach  - ENT following     GI: NPO. Shock liver, pancreatitis. Elevated TG. Elevated lactate. Consulted ACS for potential bowel ischemia as cause of persistent lactic acidosis. CT imaging 3/22 with evidence of pancreatitis. No acute surgical indication per ACS. RUQ US 3/22 with thickening of GB wall without cholelithiasis. CT A/P 3/26 negative for acute bleed. LFTs downtrending. Worsening hyperbilirubinemia. Amylase and lipase now WNL. Repeat RUQ US 4/1 with nonspecific gallbladder wall edema and thickening which may be 2/2 generalized fluid overload, mild hepatomegaly with slight nodular contour and c/f steatosis and fibrosis, irregular hypoechoic region adjacent to hepatic veins. US liver with doppler 4/2 revealed hepatomegaly with nodular contour, mildly elevated RI in main and left hepatic arteries. Hyperbilirubinemia. Had 4 BM in the past 24hrs (was tanned liquidly stool)   - TFs at goal 45ml/hr, prostat daily  - PPI daily for GI prophylaxis  - Trend LFTs daily  - continue bowel regimen        : No history of renal disease. Baseline creatinine 0.6. Anuric RUTH. Elevated CK, downtrending. Metabolic acidosis, total body fluid overload, hyperkalemia. Started on CVVH 3/21, stopped bicarb infusion 3/22. Marino removed 3/24. Hyponatremia resolved. Stopped CVVH 4/2.  Has been tolerating iHD. Net +ve 1467. CVVH restarted on 4/18 given hemorrhage and takeback to OR. Net negative 220 ml for past 24hrs.  - Nephrology following, appreciate recs  - continue CVVH and goal for at least 150 ml/hr, increase fluid removal as tolerated  - RFP BID and PRN  - Replete electrolytes judiciously  - Replete electrolyte per SICU protocol      HEME: Patient was on ppx lovenox for DVT prophylaxis prior to cardiopulmonary arrest. Concern for  massive PE patient received bolus of TPA during CPR. Started on heparin infusion overnight given concern for PE. Hemorrhagic shock 3/21, MTP and back to OR s/p Exploration of right thigh woundEvacuation of hematoma. Suture ligation of multiple bleeding vessel and several points in gluteal area. Hematology consulted 3/22 to r/o HLH. Transfused 1 RBC overnight 3/22-3/23. Thrombocytopenia, coagulapathy, hypofibrinogenemia. LE Duplex 3/25 +acute occlusive R soleal DVT. Received 2u PRBC and 1u FFP on 3/26. Heparin infusion discontinued 3/26 to r/o HIT and transitioned to bival. PF4 negative, resumed heparin infuision 3/27. Elevated IL2R and decreased NK function. C/f HLH (low suspicion), empirically treated with decadron (3/28-3/31). Thrombocytopenia on 3/30 to 18k, received 5pk, did not increment. Heparin held. Thrombocytopenia likely 2/2 to consumptive process, hematology following. Received IVIG and 5pk plts 3/31. Pancytopenia persists, improving thrombocytopenia. On 4/18 increased bleeding noted into RLE. Blood transfusion ongoing and Hep gtt held. On 4/22  DVT study of UE and LE that showed acute occlusive deep vein thrombosis visualized in the right soleal vein and acute occlusive superficial venous thrombosis visualized in the mid right cephalic vein. Today's INR is 1.4  - CBC and coags BID and PRN  - No full anticoagulation at this time  - Vascular Medicine following, appreciate consideration of IVC filter  - Notify Plastics if ANDERSON drain output exceeds >300 ml/hr  - close surveillance of RLE and drains  - maintain active T&S   - per Plastics, no subcutaneous hep for DVT prophylaxis>>> updated today regarding the DVT finding.        ENDO: History of DM2. A1c 7.5%. TSH WNL 1/2024. Previously hyperglycemic, but now recently hypoglycemic requiring D50. Glucose trended up since tube feed resumption. Insulin coverage increased to scale 2 overnight.  - continue Lantus 12U BID  increase to SSI to #4   - q4h  accuchecks    ID: On broad antimicrobial therapy. MRSA screen + 2/28/24. Pan cultures sent 3/22. Sputum NTF, +MRSA screen (completed 5 day course mupirocin), UCx and BCx negative. Completed 7 day course vanc/zosyn 3/27. Febrile to 39.1 4/3, resent blood cultures and BAL and started vanco and zosyn. Switched zosyn to reynaldo, kept on vanco, added john. Blood cultures and sputum with Klebsiella. Repeat blood cultures sent 4/4 (neg final). Wound culture 4/11 +MDRO Klebsiella aerogenes (S meropenem). Urine culture 4/12 negative. MRSA negative 4/18. Started on Vancomycin and Micafungin on 4/21  - ID following, appreciate recs  - temp q4h, wbc daily  - continue meropenem (stop date 5/2/24 per ID)  - continue Vancomycin and Micafungin started on 4/21 per ID recs  - ongoing monitoring for s/s infection         Lines:  - LIJ trialysis (4/18, SICU)  - left radial arterial line (4/5, SICU)  - PIV x2       Dispo: Continue ICU care. Patient seen and discussed with ICU attending Dr. Morrell.      Joyce Sy MD   SICU phone [phone]

## 2024-04-22 NOTE — PROGRESS NOTES
Physical Therapy    Physical Therapy Treatment    Patient Name: Jason Hollins  MRN: [id number]  Today's Date: 4/22/2024  Time Calculation  Start Time: 1441  Stop Time: 1526  Time Calculation (min): 45 min       Assessment/Plan   PT Assessment  End of Session Communication: Bedside nurse  End of Session Patient Position: Alarm off, not on at start of session  PT Plan  Inpatient/Swing Bed or Outpatient: Inpatient  PT Plan  Treatment/Interventions: Bed mobility, Transfer training, Balance training, Neuromuscular re-education, Strengthening, Endurance training, Therapeutic exercise, Therapeutic activity  PT Plan: Skilled PT  PT Frequency: 3 times per week  PT Discharge Recommendations: Moderate intensity level of continued care  Equipment Recommended upon Discharge:  (none)  PT Recommended Transfer Status: Total assist  PT - OK to Discharge: Yes    General Visit Information:   PT  Visit  PT Received On: 04/22/24  General  Family/Caregiver Present: Yes  Caregiver Feedback: Wife and niece at bedside, supportive  Co-Treatment: OT  Co-Treatment Reason: AMPAC<10, vent dependent with intent to mobilize  Prior to Session Communication: Bedside nurse, Physician  Patient Position Received: Bed, 4 rail up, Alarm off, not on at start of session (sand bed)  General Comment: Pt supine in bed upon arrival, intermittently alert and gesturing to communicate. Increased fatigue noted at end of session. VC AC 50% FiO2, RR 20, PEEP 10, .03 levo, CVVH. Since last OT session, pt  Patient bleeding into RLE given increased ANDERSON drain output and non-incrementing Hgb despite transfusion. Return to OR for washout 4/18. On 4/22  DVT study of UE and LE that showed acute occlusive deep vein thrombosis visualized in the right soleal vein and acute occlusive superficial venous thrombosis visualized in the mid right cephalic vein. Cleared by medical team to participate in session.    Subjective   Precautions:  Precautions  LE Weight Bearing Status: Right  Non-Weight Bearing  Medical Precautions: Fall precautions, Oxygen therapy device and L/min  Precautions Comment: MAP 65-90,  no other mobility restrictions following most recent OR 4/11 per plastics team, contact precautions  Vital Signs:  Vital Signs  Heart Rate: 92 (post: 81)  Resp: (!) 34 (post: 29)  SpO2: 97 % (post: 98)  BP: 130/63 (post: 105/50)    Objective   Pain:  Pain Assessment  Pain Assessment: 0-10  Pain Score:  (Pt nodded head no to pain)  Cognition:  Cognition  Overall Cognitive Status: Impaired  Orientation Level:  (Oriented to self and hospital only with choices)  Following Commands:  (Decreased command following with activity noted. Overall followed >50% simple one step commands)  Cognition Comments: Increased alertness compared to previous session  Postural Control:  Postural Control  Postural Control: Impaired  Static Sitting Balance  Static Sitting-Comment/Number of Minutes: Dep A  Dynamic Sitting Balance  Dynamic Sitting-Comments: Dep A    Treatments:  Therapeutic Activity  Therapeutic Activity 1: EOB sitting ~7 mins with Dep A x2 for sitting balance - pt deconditioned which impeded balance however also grossly limited due to amout of fluid overload.  Therapeutic Activity 2: Performed AAROM of bilat LEs while seated EOB, cues for breathing and shoulder relaxation due to elevated and protracted shoulders    Bed Mobility  Bed Mobility: Yes  Bed Mobility 1  Bed Mobility 1: Supine to sitting, Sitting to supine  Level of Assistance 1: Dependent (x4 assist)  Bed Mobility Comments 1: Draw sheet, HOB elevated, increased time required for all mobility 2/2 extensive line/tube setup +  VS monitoring  Bed Mobility 2  Bed Mobility  2: Rolling right, Rolling left  Level of Assistance 2: Dependent (x2 assist)  Bed Mobility Comments 2: draw sheet     Transfers  Transfer: No    Outcome Measures:  [facility] Basic Mobility  Turning from your back to your side while in a flat bed without using bedrails: Total  Moving  from lying on your back to sitting on the side of a flat bed without using bedrails: Total  Moving to and from bed to chair (including a wheelchair): Total  Standing up from a chair using your arms (e.g. wheelchair or bedside chair): Total  To walk in hospital room: Total  Climbing 3-5 steps with railing: Total  Basic Mobility - Total Score: 6    FSS-ICU  Ambulation: Unable to attempt due to weakness  Rolling: Total assistance (performs 25% or requires another person)  Sitting: Total assistance (performs 25% or requires another person)  Transfer Sit-to-Stand: Unable to perform  Transfer Supine-to-Sit: Total assistance (performs 25% or requires another person)  Total Score: 3    Education Documentation  Mobility Training, taught by Yamilex Cast PT at 4/22/2024  4:01 PM.  Learner: Significant Other, Family, Patient  Readiness: Acceptance  Method: Explanation  Response: Needs Reinforcement    Education Comments  No comments found.      Encounter Problems       Encounter Problems (Active)       Balance       Pt will demonstrate ability to complete sitting static/dynamic balance activities with, maxAx1, bilateral UE support and no LOB for increase in safety up D/C.  (Progressing)       Start:  04/08/24    Expected End:  05/13/24               Mobility       Pt will demonstrate ability to complete ther ex activities to improve functional strength for increase in independence upon DC.  (Progressing)       Start:  04/08/24    Expected End:  05/13/24            Pt will be able to tolerate >15 minutes of seated activity continuously without seated rest break with stable vitals and RPD </=3/10 and RPE </=13/20 for improved functional mobility  (Progressing)       Start:  04/08/24    Expected End:  05/13/24               PT Transfers       Pt will demonstrated ability to complete bed mobility and sit<>stand transfers with max assistance x2 and use of assistive device to safely. (Progressing)       Start:  04/08/24     Expected End:  05/13/24               Pain - Adult            Yamilex Cast PT, DPT

## 2024-04-22 NOTE — PROGRESS NOTES
ENT DAILY PROGRESS NOTE  Name: Jason Hollins  MRN: [id number]  : 1960    Identification Statement  The patient is a 63 y.o. male with past medical history significant for but not limited to DM2, HLD, myxoid chondrosarcoma s/p wide resection sarcoma, sciatic nerve neurolysis of RLE with right gluteal reconstruction, pedicle ALT, vas lateralus flap, pedicle gluteal and perisformis flap on 3/5/24 with plastic surgery. Patient's hospital course complicated by cardiopulmonary arrest s/p CPR with ROSC after TPA, hemorrhagic shock. Patient initially underwent tracheostomy and bronchoscopy with Dr. Perkins on 24 for acute respiratory failure with hypoxia; he experienced post-operative bleeding from trach site and returned to OR with ENT (Dr. Wolfe) on 24 for DL, bronchoscopy, esophagoscopy and exploration of neck/trach site with source of bleeding appearing to be trickling from thyroid bed down into the airway and up to the glottis. Original trach was exchanged and a 6-0 shiley proximal XLT was placed. ENT evaluated patient on 24 due to concern for bleeding from within trach at which time patient underwent tracheobronchoscopy without evidence of any dried blood or clots. Per nursing, there have not been any further episodes of bleeding from trach. He remains on ventilatory support with FiO2 50% and PEEP 10. Trach cuff up.        Subjective  Patient seen and examined at bedside this am. Primary team and nursing staff notes a green discharge surrounding the the trach stoma.  The patient had a low-grade fever overnight, and the primary team was concerned the infection source may be the trach site.    Objective  Temp:  [36.2 °C (97.2 °F)-37.6 °C (99.7 °F)] 36.2 °C (97.2 °F)  Heart Rate:  [] 86  Resp:  [0-38] 22  BP: ()/() 127/68  Arterial Line BP 1: ()/(42-77) 136/60  FiO2 (%):  [50 %] 50 %  Gen: remains critically ill in the ICU  Resp: ventilator with FiO2 50% and PEEP 10.   Head:  Atraumatic, normocephalic  Ears: Normal external ears   Nose: External nose midline   Neck: 6-0 shiley XLT shiley tracheostomy tube (cuff up), Thick-green discharge surrounding the trach stoma      Intake/Output Summary (Last 24 hours) at 4/22/2024 1257  Last data filed at 4/22/2024 1200  Gross per 24 hour   Intake 3131.26 ml   Output 2075 ml   Net 1056.26 ml         Assessment  The patient is a 63 y.o. male who the patient's primary team reached out to ENT [facility] for a trach stoma site evaluation this morning.  There is evidence of a thick green discharge surrounding the stoma.  However, on tracheobronchoscopy, there is no evidence of mucus down to the level of the ariadna or in either mainstem bronchi.  Cultures were obtained at bedside.      Recommendations:  -ENT and primary team to follow-up on cultures  - Wound care consult  - Nursing to apply split gauze inferiorly      Staffing update: 4/22/24 5501  The patient was evaluated at bedside and staffed with Dr. Perkins. Dr. Perkins agrees with the plan above

## 2024-04-22 NOTE — PROGRESS NOTES
PLAN: 4/21: - patient more somnolent, increasing WBC, Temp 99.7, Lactate 2.9 this afternoon, and increasing pressor requirements: sending blood culture x 2, resumed vancomycin, reengaged ID, and returned hydrocortisone dosing to q6hr, giving unit PRBC     PAYOR: Maryse Commercial      DISPO: LTAC     SUPPORT/CONTACT: Lakisha [phone]

## 2024-04-23 ENCOUNTER — APPOINTMENT (OUTPATIENT)
Dept: RADIOLOGY | Facility: HOSPITAL | Age: 64
DRG: 003 | End: 2024-04-23
Payer: COMMERCIAL

## 2024-04-23 LAB
ALBUMIN SERPL BCP-MCNC: 3.7 G/DL (ref 3.4–5)
ALBUMIN SERPL BCP-MCNC: 3.9 G/DL (ref 3.4–5)
ALP SERPL-CCNC: 156 U/L (ref 33–136)
ALP SERPL-CCNC: 169 U/L (ref 33–136)
ALT SERPL W P-5'-P-CCNC: 11 U/L (ref 10–52)
ALT SERPL W P-5'-P-CCNC: 11 U/L (ref 10–52)
ANION GAP BLDA CALCULATED.4IONS-SCNC: 12 MMO/L (ref 10–25)
ANION GAP BLDA CALCULATED.4IONS-SCNC: 12 MMO/L (ref 10–25)
ANION GAP BLDA CALCULATED.4IONS-SCNC: 14 MMO/L (ref 10–25)
ANION GAP SERPL CALC-SCNC: 17 MMOL/L (ref 10–20)
ANION GAP SERPL CALC-SCNC: 18 MMOL/L (ref 10–20)
APTT PPP: 42 SECONDS (ref 27–38)
APTT PPP: 47 SECONDS (ref 27–38)
AST SERPL W P-5'-P-CCNC: 82 U/L (ref 9–39)
AST SERPL W P-5'-P-CCNC: 82 U/L (ref 9–39)
BASE EXCESS BLDA CALC-SCNC: -2.5 MMOL/L (ref -2–3)
BASE EXCESS BLDA CALC-SCNC: -2.6 MMOL/L (ref -2–3)
BASE EXCESS BLDA CALC-SCNC: -3 MMOL/L (ref -2–3)
BILIRUB DIRECT SERPL-MCNC: 15.6 MG/DL (ref 0–0.3)
BILIRUB DIRECT SERPL-MCNC: 16.8 MG/DL (ref 0–0.3)
BILIRUB SERPL-MCNC: 23.7 MG/DL (ref 0–1.2)
BILIRUB SERPL-MCNC: 24.9 MG/DL (ref 0–1.2)
BODY TEMPERATURE: 37 DEGREES CELSIUS
BUN SERPL-MCNC: 31 MG/DL (ref 6–23)
BUN SERPL-MCNC: 32 MG/DL (ref 6–23)
CA-I BLD-SCNC: 1.14 MMOL/L (ref 1.1–1.33)
CA-I BLD-SCNC: 1.17 MMOL/L (ref 1.1–1.33)
CA-I BLDA-SCNC: 1.14 MMOL/L (ref 1.1–1.33)
CA-I BLDA-SCNC: 1.2 MMOL/L (ref 1.1–1.33)
CA-I BLDA-SCNC: 1.2 MMOL/L (ref 1.1–1.33)
CALCIUM SERPL-MCNC: 9 MG/DL (ref 8.6–10.6)
CALCIUM SERPL-MCNC: 9.1 MG/DL (ref 8.6–10.6)
CHLORIDE BLDA-SCNC: 101 MMOL/L (ref 98–107)
CHLORIDE BLDA-SCNC: 102 MMOL/L (ref 98–107)
CHLORIDE BLDA-SCNC: 103 MMOL/L (ref 98–107)
CHLORIDE SERPL-SCNC: 100 MMOL/L (ref 98–107)
CHLORIDE SERPL-SCNC: 101 MMOL/L (ref 98–107)
CO2 SERPL-SCNC: 21 MMOL/L (ref 21–32)
CO2 SERPL-SCNC: 22 MMOL/L (ref 21–32)
CREAT SERPL-MCNC: 0.98 MG/DL (ref 0.5–1.3)
CREAT SERPL-MCNC: 0.99 MG/DL (ref 0.5–1.3)
EGFRCR SERPLBLD CKD-EPI 2021: 86 ML/MIN/1.73M*2
EGFRCR SERPLBLD CKD-EPI 2021: 87 ML/MIN/1.73M*2
ERYTHROCYTE [DISTWIDTH] IN BLOOD BY AUTOMATED COUNT: 21.2 % (ref 11.5–14.5)
ERYTHROCYTE [DISTWIDTH] IN BLOOD BY AUTOMATED COUNT: 21.3 % (ref 11.5–14.5)
GLUCOSE BLD MANUAL STRIP-MCNC: 140 MG/DL (ref 74–99)
GLUCOSE BLD MANUAL STRIP-MCNC: 168 MG/DL (ref 74–99)
GLUCOSE BLD MANUAL STRIP-MCNC: 191 MG/DL (ref 74–99)
GLUCOSE BLDA-MCNC: 123 MG/DL (ref 74–99)
GLUCOSE BLDA-MCNC: 201 MG/DL (ref 74–99)
GLUCOSE BLDA-MCNC: 208 MG/DL (ref 74–99)
GLUCOSE SERPL-MCNC: 131 MG/DL (ref 74–99)
GLUCOSE SERPL-MCNC: 204 MG/DL (ref 74–99)
HCO3 BLDA-SCNC: 20.4 MMOL/L (ref 22–26)
HCO3 BLDA-SCNC: 21.4 MMOL/L (ref 22–26)
HCO3 BLDA-SCNC: 21.6 MMOL/L (ref 22–26)
HCT VFR BLD AUTO: 26.9 % (ref 41–52)
HCT VFR BLD AUTO: 28.9 % (ref 41–52)
HCT VFR BLD EST: 29 % (ref 41–52)
HCT VFR BLD EST: 31 % (ref 41–52)
HCT VFR BLD EST: 32 % (ref 41–52)
HGB BLD-MCNC: 9.3 G/DL (ref 13.5–17.5)
HGB BLD-MCNC: 9.8 G/DL (ref 13.5–17.5)
HGB BLDA-MCNC: 10.4 G/DL (ref 13.5–17.5)
HGB BLDA-MCNC: 10.5 G/DL (ref 13.5–17.5)
HGB BLDA-MCNC: 9.6 G/DL (ref 13.5–17.5)
INHALED O2 CONCENTRATION: 50 %
INR PPP: 1.1 (ref 0.9–1.1)
INR PPP: 1.2 (ref 0.9–1.1)
LACTATE BLDA-SCNC: 2.3 MMOL/L (ref 0.4–2)
LACTATE BLDA-SCNC: 2.4 MMOL/L (ref 0.4–2)
LACTATE BLDA-SCNC: 3 MMOL/L (ref 0.4–2)
MAGNESIUM SERPL-MCNC: 2.54 MG/DL (ref 1.6–2.4)
MAGNESIUM SERPL-MCNC: 2.67 MG/DL (ref 1.6–2.4)
MCH RBC QN AUTO: 28 PG (ref 26–34)
MCH RBC QN AUTO: 29.2 PG (ref 26–34)
MCHC RBC AUTO-ENTMCNC: 33.9 G/DL (ref 32–36)
MCHC RBC AUTO-ENTMCNC: 34.6 G/DL (ref 32–36)
MCV RBC AUTO: 83 FL (ref 80–100)
MCV RBC AUTO: 85 FL (ref 80–100)
NRBC BLD-RTO: 1.5 /100 WBCS (ref 0–0)
NRBC BLD-RTO: 3.5 /100 WBCS (ref 0–0)
OXYHGB MFR BLDA: 94.3 % (ref 94–98)
OXYHGB MFR BLDA: 96.6 % (ref 94–98)
OXYHGB MFR BLDA: 97.7 % (ref 94–98)
PCO2 BLDA: 30 MM HG (ref 38–42)
PCO2 BLDA: 33 MM HG (ref 38–42)
PCO2 BLDA: 34 MM HG (ref 38–42)
PH BLDA: 7.41 PH (ref 7.38–7.42)
PH BLDA: 7.42 PH (ref 7.38–7.42)
PH BLDA: 7.44 PH (ref 7.38–7.42)
PHOSPHATE SERPL-MCNC: 2.3 MG/DL (ref 2.5–4.9)
PHOSPHATE SERPL-MCNC: 3 MG/DL (ref 2.5–4.9)
PLATELET # BLD AUTO: 110 X10*3/UL (ref 150–450)
PLATELET # BLD AUTO: 131 X10*3/UL (ref 150–450)
PO2 BLDA: 115 MM HG (ref 85–95)
PO2 BLDA: 253 MM HG (ref 85–95)
PO2 BLDA: 78 MM HG (ref 85–95)
POTASSIUM BLDA-SCNC: 4.3 MMOL/L (ref 3.5–5.3)
POTASSIUM BLDA-SCNC: 4.3 MMOL/L (ref 3.5–5.3)
POTASSIUM BLDA-SCNC: 4.5 MMOL/L (ref 3.5–5.3)
POTASSIUM SERPL-SCNC: 4.3 MMOL/L (ref 3.5–5.3)
POTASSIUM SERPL-SCNC: 4.7 MMOL/L (ref 3.5–5.3)
PROT SERPL-MCNC: 5.4 G/DL (ref 6.4–8.2)
PROT SERPL-MCNC: 5.7 G/DL (ref 6.4–8.2)
PROTHROMBIN TIME: 12.1 SECONDS (ref 9.8–12.8)
PROTHROMBIN TIME: 13.6 SECONDS (ref 9.8–12.8)
RBC # BLD AUTO: 3.18 X10*6/UL (ref 4.5–5.9)
RBC # BLD AUTO: 3.5 X10*6/UL (ref 4.5–5.9)
SAO2 % BLDA: 100 % (ref 94–100)
SAO2 % BLDA: 97 % (ref 94–100)
SAO2 % BLDA: 99 % (ref 94–100)
SODIUM BLDA-SCNC: 131 MMOL/L (ref 136–145)
SODIUM BLDA-SCNC: 131 MMOL/L (ref 136–145)
SODIUM BLDA-SCNC: 132 MMOL/L (ref 136–145)
SODIUM SERPL-SCNC: 135 MMOL/L (ref 136–145)
SODIUM SERPL-SCNC: 135 MMOL/L (ref 136–145)
VANCOMYCIN TROUGH SERPL-MCNC: 14.2 UG/ML (ref 5–20)
WBC # BLD AUTO: 10.2 X10*3/UL (ref 4.4–11.3)
WBC # BLD AUTO: 9.1 X10*3/UL (ref 4.4–11.3)

## 2024-04-23 PROCEDURE — 2500000004 HC RX 250 GENERAL PHARMACY W/ HCPCS (ALT 636 FOR OP/ED): Performed by: NURSE PRACTITIONER

## 2024-04-23 PROCEDURE — 2500000001 HC RX 250 WO HCPCS SELF ADMINISTERED DRUGS (ALT 637 FOR MEDICARE OP): Performed by: STUDENT IN AN ORGANIZED HEALTH CARE EDUCATION/TRAINING PROGRAM

## 2024-04-23 PROCEDURE — 90937 HEMODIALYSIS REPEATED EVAL: CPT

## 2024-04-23 PROCEDURE — 99232 SBSQ HOSP IP/OBS MODERATE 35: CPT

## 2024-04-23 PROCEDURE — 99233 SBSQ HOSP IP/OBS HIGH 50: CPT | Performed by: INTERNAL MEDICINE

## 2024-04-23 PROCEDURE — 82330 ASSAY OF CALCIUM: CPT | Performed by: STUDENT IN AN ORGANIZED HEALTH CARE EDUCATION/TRAINING PROGRAM

## 2024-04-23 PROCEDURE — 2500000004 HC RX 250 GENERAL PHARMACY W/ HCPCS (ALT 636 FOR OP/ED)

## 2024-04-23 PROCEDURE — 76937 US GUIDE VASCULAR ACCESS: CPT | Performed by: RADIOLOGY

## 2024-04-23 PROCEDURE — 2500000004 HC RX 250 GENERAL PHARMACY W/ HCPCS (ALT 636 FOR OP/ED): Performed by: STUDENT IN AN ORGANIZED HEALTH CARE EDUCATION/TRAINING PROGRAM

## 2024-04-23 PROCEDURE — 2500000001 HC RX 250 WO HCPCS SELF ADMINISTERED DRUGS (ALT 637 FOR MEDICARE OP)

## 2024-04-23 PROCEDURE — 2780000003 HC OR 278 NO HCPCS

## 2024-04-23 PROCEDURE — 36010 PLACE CATHETER IN VEIN: CPT | Performed by: RADIOLOGY

## 2024-04-23 PROCEDURE — 2550000001 HC RX 255 CONTRASTS

## 2024-04-23 PROCEDURE — 2500000005 HC RX 250 GENERAL PHARMACY W/O HCPCS

## 2024-04-23 PROCEDURE — 85027 COMPLETE CBC AUTOMATED: CPT | Performed by: STUDENT IN AN ORGANIZED HEALTH CARE EDUCATION/TRAINING PROGRAM

## 2024-04-23 PROCEDURE — 74018 RADEX ABDOMEN 1 VIEW: CPT

## 2024-04-23 PROCEDURE — 74018 RADEX ABDOMEN 1 VIEW: CPT | Performed by: STUDENT IN AN ORGANIZED HEALTH CARE EDUCATION/TRAINING PROGRAM

## 2024-04-23 PROCEDURE — C1880 VENA CAVA FILTER: HCPCS

## 2024-04-23 PROCEDURE — 71045 X-RAY EXAM CHEST 1 VIEW: CPT

## 2024-04-23 PROCEDURE — 06H03DZ INSERTION OF INTRALUMINAL DEVICE INTO INFERIOR VENA CAVA, PERCUTANEOUS APPROACH: ICD-10-PCS

## 2024-04-23 PROCEDURE — 37799 UNLISTED PX VASCULAR SURGERY: CPT | Performed by: STUDENT IN AN ORGANIZED HEALTH CARE EDUCATION/TRAINING PROGRAM

## 2024-04-23 PROCEDURE — 80202 ASSAY OF VANCOMYCIN: CPT

## 2024-04-23 PROCEDURE — 82947 ASSAY GLUCOSE BLOOD QUANT: CPT

## 2024-04-23 PROCEDURE — 84100 ASSAY OF PHOSPHORUS: CPT | Performed by: STUDENT IN AN ORGANIZED HEALTH CARE EDUCATION/TRAINING PROGRAM

## 2024-04-23 PROCEDURE — 2500000002 HC RX 250 W HCPCS SELF ADMINISTERED DRUGS (ALT 637 FOR MEDICARE OP, ALT 636 FOR OP/ED): Performed by: NURSE PRACTITIONER

## 2024-04-23 PROCEDURE — 99291 CRITICAL CARE FIRST HOUR: CPT | Performed by: EMERGENCY MEDICINE

## 2024-04-23 PROCEDURE — P9047 ALBUMIN (HUMAN), 25%, 50ML: HCPCS | Mod: JZ | Performed by: PHYSICIAN ASSISTANT

## 2024-04-23 PROCEDURE — 2500000004 HC RX 250 GENERAL PHARMACY W/ HCPCS (ALT 636 FOR OP/ED): Mod: JZ | Performed by: PHYSICIAN ASSISTANT

## 2024-04-23 PROCEDURE — 84132 ASSAY OF SERUM POTASSIUM: CPT | Performed by: STUDENT IN AN ORGANIZED HEALTH CARE EDUCATION/TRAINING PROGRAM

## 2024-04-23 PROCEDURE — 2500000005 HC RX 250 GENERAL PHARMACY W/O HCPCS: Performed by: PHYSICIAN ASSISTANT

## 2024-04-23 PROCEDURE — 2720000007 HC OR 272 NO HCPCS

## 2024-04-23 PROCEDURE — P9047 ALBUMIN (HUMAN), 25%, 50ML: HCPCS | Mod: JZ

## 2024-04-23 PROCEDURE — 37191 INS ENDOVAS VENA CAVA FILTR: CPT | Performed by: RADIOLOGY

## 2024-04-23 PROCEDURE — 71045 X-RAY EXAM CHEST 1 VIEW: CPT | Performed by: STUDENT IN AN ORGANIZED HEALTH CARE EDUCATION/TRAINING PROGRAM

## 2024-04-23 PROCEDURE — 94003 VENT MGMT INPAT SUBQ DAY: CPT

## 2024-04-23 PROCEDURE — P9045 ALBUMIN (HUMAN), 5%, 250 ML: HCPCS | Mod: JZ | Performed by: PHYSICIAN ASSISTANT

## 2024-04-23 PROCEDURE — 2500000004 HC RX 250 GENERAL PHARMACY W/ HCPCS (ALT 636 FOR OP/ED): Mod: JZ

## 2024-04-23 PROCEDURE — C1769 GUIDE WIRE: HCPCS

## 2024-04-23 PROCEDURE — 85610 PROTHROMBIN TIME: CPT | Performed by: STUDENT IN AN ORGANIZED HEALTH CARE EDUCATION/TRAINING PROGRAM

## 2024-04-23 PROCEDURE — 2020000001 HC ICU ROOM DAILY

## 2024-04-23 PROCEDURE — 83735 ASSAY OF MAGNESIUM: CPT | Performed by: STUDENT IN AN ORGANIZED HEALTH CARE EDUCATION/TRAINING PROGRAM

## 2024-04-23 PROCEDURE — 90945 DIALYSIS ONE EVALUATION: CPT | Performed by: INTERNAL MEDICINE

## 2024-04-23 PROCEDURE — 82248 BILIRUBIN DIRECT: CPT | Performed by: STUDENT IN AN ORGANIZED HEALTH CARE EDUCATION/TRAINING PROGRAM

## 2024-04-23 PROCEDURE — 2500000001 HC RX 250 WO HCPCS SELF ADMINISTERED DRUGS (ALT 637 FOR MEDICARE OP): Performed by: NURSE PRACTITIONER

## 2024-04-23 RX ORDER — ALBUMIN HUMAN 250 G/1000ML
12.5 SOLUTION INTRAVENOUS EVERY 6 HOURS
Status: DISCONTINUED | OUTPATIENT
Start: 2024-04-23 | End: 2024-04-23

## 2024-04-23 RX ORDER — ALBUMIN HUMAN 250 G/1000ML
12.5 SOLUTION INTRAVENOUS ONCE
Status: COMPLETED | OUTPATIENT
Start: 2024-04-23 | End: 2024-04-23

## 2024-04-23 RX ORDER — ALBUMIN HUMAN 250 G/1000ML
25 SOLUTION INTRAVENOUS EVERY 6 HOURS
Status: COMPLETED | OUTPATIENT
Start: 2024-04-23 | End: 2024-04-24

## 2024-04-23 RX ORDER — ALBUMIN HUMAN 50 G/1000ML
12.5 SOLUTION INTRAVENOUS ONCE
Status: COMPLETED | OUTPATIENT
Start: 2024-04-23 | End: 2024-04-23

## 2024-04-23 RX ADMIN — HEPARIN SODIUM 5000 UNITS: 5000 INJECTION INTRAVENOUS; SUBCUTANEOUS at 20:24

## 2024-04-23 RX ADMIN — HEPARIN SODIUM 1000 UNITS: 1000 INJECTION INTRAVENOUS; SUBCUTANEOUS at 12:46

## 2024-04-23 RX ADMIN — INSULIN LISPRO 4 UNITS: 100 INJECTION, SOLUTION INTRAVENOUS; SUBCUTANEOUS at 12:08

## 2024-04-23 RX ADMIN — MICAFUNGIN 100 MG: 20 INJECTION, POWDER, LYOPHILIZED, FOR SOLUTION INTRAVENOUS at 16:49

## 2024-04-23 RX ADMIN — VANCOMYCIN HYDROCHLORIDE 1000 MG: 1 INJECTION, SOLUTION INTRAVENOUS at 11:42

## 2024-04-23 RX ADMIN — HEPARIN SODIUM 1000 UNITS: 1000 INJECTION INTRAVENOUS; SUBCUTANEOUS at 12:47

## 2024-04-23 RX ADMIN — ALBUMIN HUMAN 25 G: 0.25 SOLUTION INTRAVENOUS at 22:22

## 2024-04-23 RX ADMIN — MIDODRINE HYDROCHLORIDE 20 MG: 10 TABLET ORAL at 16:50

## 2024-04-23 RX ADMIN — ALBUMIN HUMAN 25 G: 0.25 SOLUTION INTRAVENOUS at 11:41

## 2024-04-23 RX ADMIN — THIAMINE HCL TAB 100 MG 500 MG: 100 TAB at 01:31

## 2024-04-23 RX ADMIN — VASOPRESSIN 0.03 UNITS/MIN: 0.2 INJECTION INTRAVENOUS at 18:30

## 2024-04-23 RX ADMIN — HEPARIN SODIUM 5000 UNITS: 5000 INJECTION INTRAVENOUS; SUBCUTANEOUS at 03:21

## 2024-04-23 RX ADMIN — SODIUM PHOSPHATE, MONOBASIC, MONOHYDRATE AND SODIUM PHOSPHATE, DIBASIC, ANHYDROUS 21 MMOL: 142; 276 INJECTION, SOLUTION INTRAVENOUS at 15:12

## 2024-04-23 RX ADMIN — NOREPINEPHRINE BITARTRATE 0.04 MCG/KG/MIN: 8 INJECTION, SOLUTION INTRAVENOUS at 01:31

## 2024-04-23 RX ADMIN — MIDODRINE HYDROCHLORIDE 20 MG: 10 TABLET ORAL at 08:58

## 2024-04-23 RX ADMIN — FLUDROCORTISONE ACETATE 0.1 MG: 0.1 TABLET ORAL at 03:21

## 2024-04-23 RX ADMIN — THIAMINE HCL TAB 100 MG 500 MG: 100 TAB at 16:50

## 2024-04-23 RX ADMIN — ALBUMIN HUMAN 12.5 G: 0.25 SOLUTION INTRAVENOUS at 16:02

## 2024-04-23 RX ADMIN — INSULIN GLARGINE 12 UNITS: 100 INJECTION, SOLUTION SUBCUTANEOUS at 08:11

## 2024-04-23 RX ADMIN — ESOMEPRAZOLE MAGNESIUM 40 MG: 40 FOR SUSPENSION ORAL at 05:28

## 2024-04-23 RX ADMIN — ALBUMIN HUMAN 12.5 G: 0.25 SOLUTION INTRAVENOUS at 05:48

## 2024-04-23 RX ADMIN — HYDROCORTISONE SODIUM SUCCINATE 50 MG: 100 INJECTION, POWDER, FOR SOLUTION INTRAMUSCULAR; INTRAVENOUS at 05:28

## 2024-04-23 RX ADMIN — MEROPENEM 1 G: 1 INJECTION, POWDER, FOR SOLUTION INTRAVENOUS at 20:24

## 2024-04-23 RX ADMIN — INSULIN LISPRO 4 UNITS: 100 INJECTION, SOLUTION INTRAVENOUS; SUBCUTANEOUS at 08:11

## 2024-04-23 RX ADMIN — INSULIN GLARGINE 12 UNITS: 100 INJECTION, SOLUTION SUBCUTANEOUS at 20:25

## 2024-04-23 RX ADMIN — CALCIUM CHLORIDE, MAGNESIUM CHLORIDE, DEXTROSE MONOHYDRATE, LACTIC ACID, SODIUM CHLORIDE, SODIUM BICARBONATE AND POTASSIUM CHLORIDE 3000 ML/HR: 3.68; 3.05; 22; 5.4; 6.46; 3.09; .314 INJECTION INTRAVENOUS at 08:43

## 2024-04-23 RX ADMIN — MEROPENEM 1 G: 1 INJECTION, POWDER, FOR SOLUTION INTRAVENOUS at 08:10

## 2024-04-23 RX ADMIN — IOHEXOL 50 ML: 350 INJECTION, SOLUTION INTRAVENOUS at 14:21

## 2024-04-23 RX ADMIN — ALBUMIN HUMAN 25 G: 0.25 SOLUTION INTRAVENOUS at 16:03

## 2024-04-23 RX ADMIN — MIDODRINE HYDROCHLORIDE 20 MG: 10 TABLET ORAL at 01:31

## 2024-04-23 RX ADMIN — HYDROCORTISONE SODIUM SUCCINATE 50 MG: 100 INJECTION, POWDER, FOR SOLUTION INTRAMUSCULAR; INTRAVENOUS at 11:42

## 2024-04-23 RX ADMIN — INSULIN LISPRO 8 UNITS: 100 INJECTION, SOLUTION INTRAVENOUS; SUBCUTANEOUS at 05:28

## 2024-04-23 RX ADMIN — ALBUMIN HUMAN 12.5 G: 0.05 INJECTION, SOLUTION INTRAVENOUS at 11:41

## 2024-04-23 RX ADMIN — HYDROCORTISONE SODIUM SUCCINATE 50 MG: 100 INJECTION, POWDER, FOR SOLUTION INTRAMUSCULAR; INTRAVENOUS at 17:45

## 2024-04-23 RX ADMIN — HEPARIN SODIUM 5000 UNITS: 5000 INJECTION INTRAVENOUS; SUBCUTANEOUS at 11:42

## 2024-04-23 RX ADMIN — THIAMINE HCL TAB 100 MG 500 MG: 100 TAB at 08:58

## 2024-04-23 RX ADMIN — NOREPINEPHRINE BITARTRATE 0.06 MCG/KG/MIN: 8 INJECTION, SOLUTION INTRAVENOUS at 21:38

## 2024-04-23 RX ADMIN — PHYTONADIONE 10 MG: 10 INJECTION, EMULSION INTRAMUSCULAR; INTRAVENOUS; SUBCUTANEOUS at 08:10

## 2024-04-23 RX ADMIN — FLUDROCORTISONE ACETATE 0.1 MG: 0.1 TABLET ORAL at 15:12

## 2024-04-23 RX ADMIN — IOHEXOL 100 ML: 350 INJECTION, SOLUTION INTRAVENOUS at 14:21

## 2024-04-23 ASSESSMENT — PAIN - FUNCTIONAL ASSESSMENT
PAIN_FUNCTIONAL_ASSESSMENT: CPOT (CRITICAL CARE PAIN OBSERVATION TOOL)

## 2024-04-23 NOTE — POST-PROCEDURE NOTE
Interventional Radiology Brief Postprocedure Note    Attending: Dr. Efrain Cohen.    Assistant: Dr. Selvin Marcial.    Diagnosis: LE DVT with contraindication to anticoagulation.    Description of procedure: IVC filter placement.     Anesthesia:  None.     Complications: None    Estimated Blood Loss: none    No specimens collected      See detailed result report with images in PACS.    The patient tolerated the procedure well without incident or complication and is in stable condition.

## 2024-04-23 NOTE — PROGRESS NOTES
Jason Hollins is a 63 y.o. male on day 49 of admission presenting with Soft tissue sarcoma (Multi).    Subjective   Interval History: Remains minimally responsive when seen this afternoon, unable to follow commands but does resist eye opening. Not on sedation. Remains intubated via trach, on low dose norepinephrine.        Review of Systems unable to obtain d/t mental status    Objective   Range of Vitals (last 24 hours)  Heart Rate:  [69-89]   Temp:  [35.1 °C (95.2 °F)-35.8 °C (96.4 °F)]   Resp:  [0-32]   BP: ()/(52-80)   SpO2:  [90 %-100 %]   Daily Weight  04/20/24 : 135 kg (298 lb 1 oz)    Body mass index is 42.77 kg/m².    Physical Exam  Obese, jaundiced/gray, s/p trach without clear drainage or oozing around site  No response to voice and command, does resist eye opening  Heart rrr, lungs with vent sounds  Abdomen remains firm and distended  3 ANDERSON drains, serosanguinous, with wound edges appearing c/d/I over right leg  Diffuse anasarca throughout body with beginnings of blistering in groins  Marino in place with dark urine  Did not examine buttock but wound vac in place    Relevant Results  Labs  Results from last 72 hours   Lab Units 04/23/24 0239 04/22/24  1244 04/22/24  0111 04/21/24  1632 04/21/24  1241   WBC AUTO x10*3/uL 10.2 9.5 10.3   < > 12.9*  12.9*   HEMOGLOBIN g/dL 9.8* 8.9* 8.5*   < > 8.0*  8.0*   HEMATOCRIT % 28.9* 24.5* 23.7*   < > 23.2*  23.2*   PLATELETS AUTO x10*3/uL 131* 144* 137*   < > 164  164   LYMPHO PCT MAN %  --   --   --   --  2.5   MONO PCT MAN %  --   --   --   --  2.5   EOSINO PCT MAN %  --   --   --   --  0.0    < > = values in this interval not displayed.     Results from last 72 hours   Lab Units 04/23/24 0239 04/22/24  1244 04/22/24  0111   SODIUM mmol/L 135* 135* 135*   POTASSIUM mmol/L 4.7 4.8 4.7   CHLORIDE mmol/L 101 99 100   CO2 mmol/L 21 24 24   BUN mg/dL 31* 36* 40*   CREATININE mg/dL 0.99 1.11 1.25   GLUCOSE mg/dL 204* 204* 244*   CALCIUM mg/dL 9.0 8.8 8.1*   ANION  GAP mmol/L 18 17 16   EGFR mL/min/1.73m*2 86 75 65   PHOSPHORUS mg/dL 2.3* 2.0* 2.5     Results from last 72 hours   Lab Units 04/23/24  0239 04/22/24  1244 04/22/24  0111   ALK PHOS U/L 169* 148* 139*   BILIRUBIN TOTAL mg/dL 24.9* 23.1* 22.1*   BILIRUBIN DIRECT mg/dL 16.8* 16.2* 15.3*   PROTEIN TOTAL g/dL 5.4* 5.3* 5.0*   ALT U/L 11 8* 8*   AST U/L 82* 67* 59*   ALBUMIN g/dL 3.7 3.7 3.5       Microbiology  Susceptibility data from last 14 days.  Collected Specimen Info Organism Amoxicillin/Clavulanate Ampicillin Ampicillin/Sulbactam Cefazolin Cefepime Ciprofloxacin Ertapenem Gentamicin Imipenem Levofloxacin Meropenem Piperacillin/Tazobactam   04/11/24 Tissue from ABSCESS Klebsiella (Enterobacter) aerogenes R R R R S S S S R S S S     Collected Specimen Info Organism Trimethoprim/Sulfamethoxazole   04/11/24 Tissue from ABSCESS Klebsiella (Enterobacter) aerogenes S   4/21 bld x2 ngtd  4/22 trach fluid culture in progress, mixed gram positive and gram negative    Imaging  CXR 4/23  1. Interval improvement in bilateral airspace opacity/blunting of the  costophrenic angles. The findings likely correlate with decreasing  pleural effusions with or without mild background pulmonary edema.  Continued attention on follow-up imaging is recommended.  2. Medical devices as above. No pneumothorax.    KUB 4/23  1. Enteric tube tip projecting over expected location of gastric body.  2.  Nonobstructive bowel gas pattern.    Assessment/Plan   63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 Course subsequently included PEA arrest in setting of PE 3/20, hemorrhagic shock from R thigh wound bleeding, trach 4/1 with trach revision 4/4 in setting of bleeding.     He developed a K. Aerogenes bacteremia in setting of VAP, for which he was planned for 10 days of therapy. His procedure was delayed until today, and in the setting of recent  and now resolved hypotension, we were contacted regarding further management. Depending on depth of wound and results of any obtained cultures, we can finalize a course.     04/14/2024 Update:       4/3/2024 BAL growing Klebsiella aerogenes       4/3/2024 blood growing  Klebsiella aerogenes       4/11/2024 surgical specimen from gluteal closure and reconstruction growing Klebsiella aerogenes.          I reviewed susceptibility testing patterns for all 3 isolates.  Some subtle differences but suspect these are all similar reflecting patient's colonization status with Klebsiella originates.  On 4/11/2024 patient had tissue advancement and closure of complex right leg and buttock wound.  Large area of necrotic tissue was removed and culture positive for Klebsiella aerogenes.       Therefore would extend meropenem treatment course an additional 7 days while monitoring for postoperative healing or complications.           THUS new stop date tentatively set in 7 days would be 4/21/2024.  At that time would need to reassess and consider stopping or extending further depending on clinical status and wound healing.     04/19/2024 Update: OR on 4/18 with finding of hematoma and diffuse bleed but per verbal report from plastics, no infection or necrotic tissue and no obvious single source of bleed. No cultures thus obtained. Plan will continue meropenem for 2 weeks from this OR date.     04/21/2024 update: Called back for clinical status change - more confusion and mild leukocytosis, trending upwards from normal baseline. Firm abdomen - suspect possible GI issue (infection vs ileus/constipation). Agree with cultures and broadening abx.    04/23/24 update: imaging without acute etiology of infection, mainly diffuse anasarca. Blood cultures from 4/21 no growth to date. Did have culture of julio c-trach fluid yesterday - unclear significance.    Recommendations:  - continue meropenem 1g q12h IV (dosed for CVVH) thru 5/2  - can stop  vancomycin  - if blood cultures remain negative from 4/21 tomorrow and no yeast is isolated in surgical cultures, would stop micafungin     Discussed with Dr. Mason  ID will sign off. Please reach out with any questions.      Debbie Adam MD  PGY-5 ID Fellow  Team A - pager [phone]  For new consults, page [phone]

## 2024-04-23 NOTE — CONSULTS
Wound Care Consult     Visit Date: 4/23/2024      Patient Name: Jason Hollins         MRN: [id number]           YOB: 1960     Reason for Consult: skin tear under trach       Wound Team Summary Assessment:   Skin under trach plate and trach ties assessed today. Trach tie removed and replaced.  Area is moist due to secretions and tube feed.  Cleansed with bath wipes/CHG wipes, and cavilon then applied.   Mepilex Lite applied under trach plate. No skin breakdown noted at this time.    Perianal area with incontinence associated dermatitis; please continue Triad wound ointment dressing to area as needed.  Turn and reposition every 2 hours while in sand bed.     Tierra Rios RN  4/23/2024  1:26 PM

## 2024-04-23 NOTE — PRE-PROCEDURE NOTE
Interventional Radiology Preprocedure Note    Indication for procedure: The primary encounter diagnosis was Shock (Multi). Diagnoses of Extraskeletal myxoid chondrosarcoma (Multi), Soft tissue sarcoma (Multi), Type 2 diabetes mellitus with hyperglycemia, without long-term current use of insulin (Multi), Cardiopulmonary arrest (Multi), Cardiac arrest (Multi), Other pulmonary embolism without acute cor pulmonale (Multi), Acute saddle pulmonary embolism with acute cor pulmonale (Multi), Cardiac arrest due to other underlying condition (Multi), Cardiogenic shock (Multi), Other specified soft tissue disorders, Other specified soft tissue disorders, RUTH (acute kidney injury) (CMS-HCC), Neoplastic malignant related fatigue, Acute respiratory failure with hypoxia (Multi), Tracheostomy hemorrhage (Multi), Postoperative wound breakdown, initial encounter, Postoperative wound breakdown, sequela, Hematoma, Acute kidney injury (nontraumatic) (CMS-HCC), Swelling, and Localized edema were also pertinent to this visit.    Relevant review of systems: NA    Relevant Labs:   Lab Results   Component Value Date    CREATININE 0.99 04/23/2024    EGFR 86 04/23/2024    INR 1.1 04/23/2024    PROTIME 12.1 04/23/2024       Planned Sedation/Anesthesia: Moderate    Airway assessment: normal    Directed physical examination:    Consent obtained by wife. All questions answered.   Patient with trach in place. Groin skin clean/intact.    Mallampati: N/a.    ASA Score: ASA 3 - Patient with moderate systemic disease with functional limitations    Benefits, risks and alternatives of procedure and planned sedation have been discussed with the patient and/or their representative. All questions answered and they agree to proceed.

## 2024-04-23 NOTE — PROGRESS NOTES
Jason Hollins is a 63 y.o. male on day 49 of admission presenting with Soft tissue sarcoma (Multi).    Subjective   Overnight his blood pressure got a little bit soft and received 50 cc of 25% albumin with a good response.  Had 2 bowel movement overnight that was liquidy and tanned in color.  Also was told from the nurse that there was some food coming out of his trach (probably aspirated)  Objective     Physical Exam  Constitutional:       General: He is not in acute distress.     Appearance: He is obese. He is ill-appearing.   HENT:      Nose:      Comments: Dobhoff in R nare, bridled in place.      Mouth/Throat:      Mouth: Mucous membranes are moist.   Eyes:      General: Scleral icterus present.      Extraocular Movements: Extraocular movements intact.      Pupils: Pupils are equal, round, and reactive to light.   Neck:      Comments: LIJ trialysis in place, dressing c/d/i.   Cardiovascular:      Rate and Rhythm: Normal rate and regular rhythm.      Pulses:           Radial pulses are 1+ on the right side and 1+ on the left side.        Dorsalis pedis pulses are detected w/ Doppler on the right side and detected w/ Doppler on the left side.        Posterior tibial pulses are detected w/ Doppler on the right side and detected w/ Doppler on the left side.      Heart sounds: Normal heart sounds.   Pulmonary:      Comments: Diffuse inspiratory rales and diminished bases bilaterally. Currently on VC/AC  Abdominal:      General: Bowel sounds are normal. There is distension.      Tenderness: There is no abdominal tenderness.      Comments: Obese abdomen, firm, anasarca.   Genitourinary:     Penis: Swelling present.       Comments: Scrotal edema. Marino in place with minimal dark sharee UOP.  Musculoskeletal:      Right lower leg: 3+ Edema present.      Left lower leg: 3+ Edema present.      Comments: 3+ pitting edema noted bilaterally in upper and lower extremities. R thigh with mild swelling (improved from prior day) and  "with surgical incision sites clean, dry, intact. 3 ANDERSON drains and wound vac with minimal output this morning.   Skin:     General: Skin is warm and dry.      Coloration: Skin is jaundiced.      Comments: ANDERSON drain x2 and wound vac to R gluteal flap site.   Neurological:      Mental Status: He is alert.      Comments: Alert, nodding appropriately to yes/no questions. Following simple commands with BUE and LLE, no movement with RLE.   Psychiatric:      Comments: Calm and cooperative.       Last Recorded Vitals  Blood pressure 95/78, pulse 82, temperature 35.1 °C (95.2 °F), temperature source Temporal, resp. rate 24, height 1.778 m (5' 10\"), weight 135 kg (298 lb 1 oz), SpO2 100%.  Intake/Output last 3 Shifts:  I/O last 3 completed shifts:  In: 3485 (25.8 mL/kg) [I.V.:389 (2.9 mL/kg); Other:95; NG/GT:1745; IV Piggyback:1256]  Out: 7111 (52.6 mL/kg) [Urine:18 (0 mL/kg/hr); Drains:241; Other:6852]  Weight: 135.2 kg     Relevant Results  Scheduled medications  albumin human, 25 g, intravenous, q6h  docusate sodium, 100 mg, nasogastric tube, BID  esomeprazole, 40 mg, nasogastric tube, Daily  fludrocortisone, 0.1 mg, nasogastric tube, q12h  heparin (porcine), 5,000 Units, subcutaneous, q8h  hydrocortisone sodium succinate, 50 mg, intravenous, q6h  insulin glargine, 12 Units, subcutaneous, q12h  insulin lispro, 0-20 Units, subcutaneous, q4h  meropenem, 1 g, intravenous, q12h  micafungin, 100 mg, intravenous, q24h  midodrine, 20 mg, orogastric tube, q8h  oxygen, , inhalation, Continuous - Inhalation  sodium phosphate, 21 mmol, intravenous, Once  thiamine, 500 mg, nasogastric tube, q8h  vancomycin, 7.5 mg/kg, intravenous, q12h      Continuous medications  lactated Ringer's, 5 mL/hr, Last Rate: 5 mL/hr (04/22/24 0700)  norepinephrine, 0.01-1 mcg/kg/min (Dosing Weight), Last Rate: 0.07 mcg/kg/min (04/23/24 0818)  PrismaSol 4/2.5, 3,000 mL/hr, Last Rate: 3,000 mL/hr (04/23/24 0843)      PRN medications  PRN medications: " acetaminophen, alteplase, alteplase, calcium gluconate, calcium gluconate, dextrose, glucagon, heparin, heparin, HYDROmorphone, HYDROmorphone, labetaloL, magnesium sulfate, magnesium sulfate, midodrine, sodium chloride  Results for orders placed or performed during the hospital encounter of 03/05/24 (from the past 24 hour(s))   Calcium, ionized   Result Value Ref Range    POCT Calcium, Ionized 1.15 1.1 - 1.33 mmol/L   CBC   Result Value Ref Range    WBC 9.5 4.4 - 11.3 x10*3/uL    nRBC 0.5 (H) 0.0 - 0.0 /100 WBCs    RBC 3.08 (L) 4.50 - 5.90 x10*6/uL    Hemoglobin 8.9 (L) 13.5 - 17.5 g/dL    Hematocrit 24.5 (L) 41.0 - 52.0 %    MCV 80 80 - 100 fL    MCH 28.9 26.0 - 34.0 pg    MCHC 36.3 (H) 32.0 - 36.0 g/dL    RDW 19.9 (H) 11.5 - 14.5 %    Platelets 144 (L) 150 - 450 x10*3/uL   Coagulation Screen   Result Value Ref Range    Protime 13.6 (H) 9.8 - 12.8 seconds    INR 1.2 (H) 0.9 - 1.1    aPTT 40 (H) 27 - 38 seconds   Hepatic function panel   Result Value Ref Range    Albumin 3.7 3.4 - 5.0 g/dL    Bilirubin, Total 23.1 (H) 0.0 - 1.2 mg/dL    Bilirubin, Direct 16.2 (H) 0.0 - 0.3 mg/dL    Alkaline Phosphatase 148 (H) 33 - 136 U/L    ALT 8 (L) 10 - 52 U/L    AST 67 (H) 9 - 39 U/L    Total Protein 5.3 (L) 6.4 - 8.2 g/dL   Magnesium   Result Value Ref Range    Magnesium 2.60 (H) 1.60 - 2.40 mg/dL   Basic Metabolic Panel   Result Value Ref Range    Glucose 204 (H) 74 - 99 mg/dL    Sodium 135 (L) 136 - 145 mmol/L    Potassium 4.8 3.5 - 5.3 mmol/L    Chloride 99 98 - 107 mmol/L    Bicarbonate 24 21 - 32 mmol/L    Anion Gap 17 10 - 20 mmol/L    Urea Nitrogen 36 (H) 6 - 23 mg/dL    Creatinine 1.11 0.50 - 1.30 mg/dL    eGFR 75 >60 mL/min/1.73m*2    Calcium 8.8 8.6 - 10.6 mg/dL   Phosphorus   Result Value Ref Range    Phosphorus 2.0 (L) 2.5 - 4.9 mg/dL   Blood Gas Arterial Full Panel   Result Value Ref Range    POCT pH, Arterial 7.50 (H) 7.38 - 7.42 pH    POCT pCO2, Arterial 29 (L) 38 - 42 mm Hg    POCT pO2, Arterial 80 (L) 85 - 95  mm Hg    POCT SO2, Arterial 97 94 - 100 %    POCT Oxy Hemoglobin, Arterial 95.0 94.0 - 98.0 %    POCT Hematocrit Calculated, Arterial 29.0 (L) 41.0 - 52.0 %    POCT Sodium, Arterial 132 (L) 136 - 145 mmol/L    POCT Potassium, Arterial 4.7 3.5 - 5.3 mmol/L    POCT Chloride, Arterial 103 98 - 107 mmol/L    POCT Ionized Calcium, Arterial 1.19 1.10 - 1.33 mmol/L    POCT Glucose, Arterial 212 (H) 74 - 99 mg/dL    POCT Lactate, Arterial 1.2 0.4 - 2.0 mmol/L    POCT Base Excess, Arterial 0.0 -2.0 - 3.0 mmol/L    POCT HCO3 Calculated, Arterial 22.6 22.0 - 26.0 mmol/L    POCT Hemoglobin, Arterial 9.7 (L) 13.5 - 17.5 g/dL    POCT Anion Gap, Arterial 11 10 - 25 mmo/L    Patient Temperature 37.0 degrees Celsius    FiO2 50 %   POCT GLUCOSE   Result Value Ref Range    POCT Glucose 186 (H) 74 - 99 mg/dL   POCT GLUCOSE   Result Value Ref Range    POCT Glucose 180 (H) 74 - 99 mg/dL   POCT GLUCOSE   Result Value Ref Range    POCT Glucose 138 (H) 74 - 99 mg/dL   Magnesium   Result Value Ref Range    Magnesium 2.67 (H) 1.60 - 2.40 mg/dL   Hepatic function panel   Result Value Ref Range    Albumin 3.7 3.4 - 5.0 g/dL    Bilirubin, Total 24.9 (H) 0.0 - 1.2 mg/dL    Bilirubin, Direct 16.8 (H) 0.0 - 0.3 mg/dL    Alkaline Phosphatase 169 (H) 33 - 136 U/L    ALT 11 10 - 52 U/L    AST 82 (H) 9 - 39 U/L    Total Protein 5.4 (L) 6.4 - 8.2 g/dL   Coagulation Screen   Result Value Ref Range    Protime 12.1 9.8 - 12.8 seconds    INR 1.1 0.9 - 1.1    aPTT 42 (H) 27 - 38 seconds   CBC   Result Value Ref Range    WBC 10.2 4.4 - 11.3 x10*3/uL    nRBC 1.5 (H) 0.0 - 0.0 /100 WBCs    RBC 3.50 (L) 4.50 - 5.90 x10*6/uL    Hemoglobin 9.8 (L) 13.5 - 17.5 g/dL    Hematocrit 28.9 (L) 41.0 - 52.0 %    MCV 83 80 - 100 fL    MCH 28.0 26.0 - 34.0 pg    MCHC 33.9 32.0 - 36.0 g/dL    RDW 21.2 (H) 11.5 - 14.5 %    Platelets 131 (L) 150 - 450 x10*3/uL   Calcium, ionized   Result Value Ref Range    POCT Calcium, Ionized 1.17 1.1 - 1.33 mmol/L   Blood Gas Arterial Full  Panel   Result Value Ref Range    POCT pH, Arterial 7.41 7.38 - 7.42 pH    POCT pCO2, Arterial 34 (L) 38 - 42 mm Hg    POCT pO2, Arterial 253 (H) 85 - 95 mm Hg    POCT SO2, Arterial 100 94 - 100 %    POCT Oxy Hemoglobin, Arterial 97.7 94.0 - 98.0 %    POCT Hematocrit Calculated, Arterial 31.0 (L) 41.0 - 52.0 %    POCT Sodium, Arterial 131 (L) 136 - 145 mmol/L    POCT Potassium, Arterial 4.5 3.5 - 5.3 mmol/L    POCT Chloride, Arterial 102 98 - 107 mmol/L    POCT Ionized Calcium, Arterial 1.20 1.10 - 1.33 mmol/L    POCT Glucose, Arterial 208 (H) 74 - 99 mg/dL    POCT Lactate, Arterial 2.3 (H) 0.4 - 2.0 mmol/L    POCT Base Excess, Arterial -2.5 (L) -2.0 - 3.0 mmol/L    POCT HCO3 Calculated, Arterial 21.6 (L) 22.0 - 26.0 mmol/L    POCT Hemoglobin, Arterial 10.4 (L) 13.5 - 17.5 g/dL    POCT Anion Gap, Arterial 12 10 - 25 mmo/L    Patient Temperature 37.0 degrees Celsius    FiO2 50 %   Basic Metabolic Panel   Result Value Ref Range    Glucose 204 (H) 74 - 99 mg/dL    Sodium 135 (L) 136 - 145 mmol/L    Potassium 4.7 3.5 - 5.3 mmol/L    Chloride 101 98 - 107 mmol/L    Bicarbonate 21 21 - 32 mmol/L    Anion Gap 18 10 - 20 mmol/L    Urea Nitrogen 31 (H) 6 - 23 mg/dL    Creatinine 0.99 0.50 - 1.30 mg/dL    eGFR 86 >60 mL/min/1.73m*2    Calcium 9.0 8.6 - 10.6 mg/dL   Phosphorus   Result Value Ref Range    Phosphorus 2.3 (L) 2.5 - 4.9 mg/dL   Blood Gas Arterial Full Panel   Result Value Ref Range    POCT pH, Arterial 7.44 (H) 7.38 - 7.42 pH    POCT pCO2, Arterial 30 (L) 38 - 42 mm Hg    POCT pO2, Arterial 115 (H) 85 - 95 mm Hg    POCT SO2, Arterial 99 94 - 100 %    POCT Oxy Hemoglobin, Arterial 96.6 94.0 - 98.0 %    POCT Hematocrit Calculated, Arterial 32.0 (L) 41.0 - 52.0 %    POCT Sodium, Arterial 131 (L) 136 - 145 mmol/L    POCT Potassium, Arterial 4.3 3.5 - 5.3 mmol/L    POCT Chloride, Arterial 103 98 - 107 mmol/L    POCT Ionized Calcium, Arterial 1.14 1.10 - 1.33 mmol/L    POCT Glucose, Arterial 201 (H) 74 - 99 mg/dL     POCT Lactate, Arterial 2.4 (H) 0.4 - 2.0 mmol/L    POCT Base Excess, Arterial -3.0 (L) -2.0 - 3.0 mmol/L    POCT HCO3 Calculated, Arterial 20.4 (L) 22.0 - 26.0 mmol/L    POCT Hemoglobin, Arterial 10.5 (L) 13.5 - 17.5 g/dL    POCT Anion Gap, Arterial 12 10 - 25 mmo/L    Patient Temperature 37.0 degrees Celsius    FiO2 50 %   POCT GLUCOSE   Result Value Ref Range    POCT Glucose 191 (H) 74 - 99 mg/dL   POCT GLUCOSE   Result Value Ref Range    POCT Glucose 168 (H) 74 - 99 mg/dL       Assessment/Plan   Principal Problem:    Soft tissue sarcoma (Multi)  Active Problems:    Acute kidney injury (nontraumatic) (CMS-HCC)    Pulmonary embolus (Multi)    Anemia    Thrombocytopenia (CMS-HCC)    Current chronic use of systemic steroids    Gastroesophageal reflux disease without esophagitis    Extraskeletal myxoid chondrosarcoma (Multi)    Cardiopulmonary arrest (Multi)    Acute respiratory failure with hypoxia (Multi)    Tracheostomy hemorrhage (Multi)    Postoperative wound breakdown, initial encounter    Postoperative wound breakdown, sequela    Hematoma    Jason Hollins is a 63 y.o. 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 Hawkins and Dr. Smith on 3/5/24.  Post operative course was uncomplicated and patient was on RNF until 3/20 for prolonged bed rest to avoid hip flexion to maintain flap integrity.  Patient was on prophylactic lovenox while on bedrest.      3/20: Cardiopulmonary arrest s/p CPR with ROSC after TPA, overnight started on heparin, epo, increased pressor requirements, increased swelling at flap site.      3/21: Hemorrhagic shock, MTP. Firm and large right flap site. Return to OR s/p Exploration of right thigh wound, Evacuation of hematoma. Suture ligation of multiple bleeding vessel and several points in gluteal  area.      4/1: s/p Trach     4/4: return OR with ENT s/p laryngoscopy, esophagoscopy and bronchoscopy, trach revision. Found extensive clot burden in esophagus and stomach as well as in the lungs. Oozing at thyroid bed cauterized. Trach exchanged to new 6.0 prox XLT shiley. OR findings: blood up glottis and from thyroid bed to airway.     4/9: Patient is medically stable for OR on 4/11/24.  He is appropriately n.p.o. since midnight and has had more DVT prophylaxis held for OR today.     4/18: Patient bleeding into RLE given increased ANDERSON drain output and non-incrementing Hgb despite transfusion. Return to OR for washout.     4/21: - patient more somnolent, increasing WBC, Temp 99.7, Lactate 2.9 this afternoon, and increasing pressor requirements: sending blood culture x 2, resumed vancomycin, reengaged ID, and returned hydrocortisone dosing to q6hr, giving unit PRBC    Plan:  NEURO: History of myxoid chondrosarcoma s/p wide resection sarcoma, sciatic nerve neurolysis of right lower extremity with right gluteal reconstruction, pedicle ALT, vastus lateralus flap, pedicle gluteal and perisformis flap with Dr. Hawkins and Dr. Smith on 3/5/24 s/p cardiopulmonary arrest with ROSC 3/20. CT head 3/22 without acute process. Weaned off cisatracurium and propofol overnight 3/23-3/24. Ketamine weaned off 3/25 and Fentanyl weaned off 3/26 am. Repeat CT Head 3/26 without acute process. MRI Brain 3/30, negative for cerebral infarction or hemorrhage. Neuro exam - following commands except for RLE, tracking, nodding/shaking head to questions.  CT head on 4/22 that was unremarkable.  - ongoing neuro and pain assessments  - Start Thiamine 500 mg TID   - PRN dilaudid for pain control   - PT/OT -> AROM     CV: History of HTN, HLD. Acute cardiopulmonary arrest 2/2 to possible massive PE vs massive STEMI, received multiple rounds of CPR and one defibrillation.  Sustained ROSC achieved after TPA bolus. On high dose levophed, epinephrine,  vaso, angioT2. Plan on 3/21 was for ECMO which was aborted secondary to large hemhorrge at right flap site. Had MTP and taken OR with 5L evacuated. ECHO 3/21: Normal LV, Mod enlarged RV, slight suggestion of a Townsend's sign / RV strain, low normal RV function. ECHO 3/22 with hyperdynamic LV. New onset Afib with RVR overnight 3/22-3/23, dosed with 150mg amio x2, started infusion at 1mg/min thereafter. AT2 weaned off. Amio infusion discontinued 3/25. Lactate downtrending. Vaso and epi weaned off. Remains in NSR. iEpo weaned off 3/27. Repeat TTE 3/29 and 4/2 essentially unchanged. Levo resumed 4/2 evening to continue aggressive fluid removal. Repeat TTE on 4/10 with LVEF 65-70% and RV difficulty to assess. Weaned off levo 4/13. Marginal hypotension 4/20 . Currently on  norepi 0.08 and BP has been stable while also decreasing fluid removal to 100cc hr.   - continuous EKG/abp monitoring  - Goal map range 65-90  - Daily scheduled 25% Albumin 50 ml   - continue levophed infusion to support BP while removing fluid with CVVH, wean off as tolerated  - continue midodrine 20mg q8h   - Continue florinef 0.1mg BID   - continue hydrocortisone 50 mg q6hr       PULM: Arrived to SICU intubated julio c-CPR. Concern for massive PE. Started on iEpo, currently on 0.05. Hypoxic respiratory failure. Severe ARDS. ETT exchanged 3/23. CT Chest 3/26 with interval JOHN consolidative/ground glass opacity. S/p Tracheostomy on 4/1. S/p trach revision on 4/4. CXR with bilateraly pulmonary edema and pleural effusions R>L. Currently on VC/AC mode, failed CPAP trial.  - wean vent as tolerated, maintain SpO2 >90%, PaO2 >60  - additional bronchial hygiene as indicated  - Daily CXR  - ABG prn     ENT: Had epistaxis 3/23, completed afrin bid x3 days. S/p trach on 4/1. Bleeding from trach site 4/2 am, packing placed by ENT. Repeat episode of bloody tracheal secretions 4/3 through this am. Taken back to OR with ENT 4/4 s/p trach revision. Noted to have  greenish discharge around his tracheostomy on 4/22 and ENT saw the patient.   - Culture ordered for drainage around of the trach>> grew gram positive and negative bacteria   - ENT following     GI: NPO. Shock liver, pancreatitis. Elevated TG. Elevated lactate. Consulted ACS for potential bowel ischemia as cause of persistent lactic acidosis. CT imaging 3/22 with evidence of pancreatitis. No acute surgical indication per ACS. RUQ US 3/22 with thickening of GB wall without cholelithiasis. CT A/P 3/26 negative for acute bleed. LFTs downtrending. Worsening hyperbilirubinemia. Amylase and lipase now WNL. Repeat RUQ US 4/1 with nonspecific gallbladder wall edema and thickening which may be 2/2 generalized fluid overload, mild hepatomegaly with slight nodular contour and c/f steatosis and fibrosis, irregular hypoechoic region adjacent to hepatic veins. US liver with doppler 4/2 revealed hepatomegaly with nodular contour, mildly elevated RI in main and left hepatic arteries. Hyperbilirubinemia. Had 5 BM in the past 24hrs (was tanned liquidly stool). Today noted by the RN that some of the feeding tube content is coming out out from his trach which could be because of aspiration.   - HOLD TFs at goal 45ml/hr, prostat daily  - KUB xray   - PPI daily for GI prophylaxis  - Trend LFTs daily  - stop bowel regimen given his current diarrhea        : No history of renal disease. Baseline creatinine 0.6. Anuric RUTH. Elevated CK, downtrending. Metabolic acidosis, total body fluid overload, hyperkalemia. Started on CVVH 3/21, stopped bicarb infusion 3/22. Marino removed 3/24. Hyponatremia resolved. Stopped CVVH 4/2.  Has been tolerating iHD. Net +ve 1467. CVVH restarted on 4/18 given hemorrhage and takeback to OR. Net negative 4L for past 24hrs.  - Nephrology following, appreciate recs  - continue CVVH and goal for at least 100 ml/hr if MAP is above 65 and to, increase fluid removal as tolerated  - RFP BID and PRN  - Replete  electrolytes judiciously  - Replete electrolyte per SICU protocol      HEME: Patient was on ppx lovenox for DVT prophylaxis prior to cardiopulmonary arrest. Concern for massive PE patient received bolus of TPA during CPR. Started on heparin infusion overnight given concern for PE. Hemorrhagic shock 3/21, MTP and back to OR s/p Exploration of right thigh woundEvacuation of hematoma. Suture ligation of multiple bleeding vessel and several points in gluteal area. Hematology consulted 3/22 to r/o HLH. Transfused 1 RBC overnight 3/22-3/23. Thrombocytopenia, coagulapathy, hypofibrinogenemia. LE Duplex 3/25 +acute occlusive R soleal DVT. Received 2u PRBC and 1u FFP on 3/26. Heparin infusion discontinued 3/26 to r/o HIT and transitioned to bival. PF4 negative, resumed heparin infuision 3/27. Elevated IL2R and decreased NK function. C/f HLH (low suspicion), empirically treated with decadron (3/28-3/31). Thrombocytopenia on 3/30 to 18k, received 5pk, did not increment. Heparin held. Thrombocytopenia likely 2/2 to consumptive process, hematology following. Received IVIG and 5pk plts 3/31. Pancytopenia persists, improving thrombocytopenia. On 4/18 increased bleeding noted into RLE. Blood transfusion ongoing and Hep gtt held. On 4/22  DVT study of UE and LE that showed acute occlusive deep vein thrombosis visualized in the right soleal vein and acute occlusive superficial venous thrombosis visualized in the mid right cephalic vein. Today's INR is 1.1  - CBC and coags BID and PRN  - Sub q heparin 5000 units q8hr, plastic is ok with that  - Consult IR for IVC filter placement    - No full therapeutic anticoagulation at this time  - Notify Plastics if ANDERSON drain output exceeds >300 ml/hr  - close surveillance of RLE and drains  - maintain active T&S        ENDO: History of DM2. A1c 7.5%. TSH WNL 1/2024. Previously hyperglycemic, but recently hypoglycemic requiring D50. Glucose trended up since tube feed resumption. Now his feeding  tube is held due to concern for aspiration.   - continue Lantus 12U BID  increase to SSI to #4   - q4h accuchecks    ID: On broad antimicrobial therapy. MRSA screen + 2/28/24. Pan cultures sent 3/22. Sputum NTF, +MRSA screen (completed 5 day course mupirocin), UCx and BCx negative. Completed 7 day course vanc/zosyn 3/27. Febrile to 39.1 4/3, resent blood cultures and BAL and started vanco and zosyn. Switched zosyn to reynaldo, kept on vanco, added john. Blood cultures and sputum with Klebsiella. Repeat blood cultures sent 4/4 (neg final). Wound culture 4/11 +MDRO Klebsiella aerogenes (S meropenem). Urine culture 4/12 negative. MRSA negative 4/18. Started on Vancomycin and Micafungin on 4/21. Blood cultures NGTD and also Culture from his trach drainage grew gram positive and negative bacteria.    - ID following, appreciate recs  - temp q4h, wbc daily  - continue meropenem (stop date 5/2/24 per ID)  - Stop Vancomycin 4/21-4/23 per ID recs  - Consider stopping Micafungin tomorrow (started on 4/21 per ID recs)  - ongoing monitoring for s/s infection         Lines:  - LIJ trialysis (4/18, SICU)  - left radial arterial line (4/5, SICU)  - PIV x2       Dispo: Continue ICU care. Patient seen and discussed with ICU attending Dr. Morrell.      Joyce Sy MD   SICU phone [phone]

## 2024-04-23 NOTE — PROGRESS NOTES
Department of Plastic and Reconstructive Surgery  Daily Progress Note    Patient Name: Jason Hollins  MRN: [id number]  Date:  04/23/24     Subjective   Found resting in bed in surgical ICU. Pt visibly more alert and responsive today. Incisional wound vac intact to R hip surgical site, without issue overnight. ANDERSON drain outputs appropriately down-trending (411 -> 246 -> 165 ml dark bloody serous drainage total over the past 72 hrs). No drainage from trach appreciated on exam today. Pt remained afebrile.    Objective   Physical Exam   Constitutional: Appears critically ill. Lying in bed in surgical ICU, very responsive to verbal stimuli today. Nods/shakes head and thumbs up to questions. Able to track.  ENMT: MMM, dobhoff in R nare.  Cardiovascular: RRR on telemetry monitor.  Respiratory/Thorax: Trach to vent.  Gastrointestinal: Abdomen soft, protuberant.   Musculoskeletal: Able to squeeze bilateral hands, wiggles toes on the left, minimal movement on RLE, effort to move foot observed.   Extremities: Generalized pitting edema. Right thigh edematous, edema improved S/P hematoma evacuation. Visible portion of flap recipient site and adjacent anterior R thigh incisions appear stable. Posterior flap incision line and posterior thigh/gluteal region incision dressed with incisional wound vac, maintaining low continuous suction at -50mmHg, no alarms for leak, obstruction or malfunction, no output in collecting canister. ANDERSON drain x 3 right upper leg with appropriate dark red, bloody drainage since OR.  Neurological: Off sedation, able to respond to commands with head nod and thumbs up  Skin: Edematous, jaundiced, warm.     Current Medications  Scheduled medications  albumin human, 25 g, intravenous, q6h  docusate sodium, 100 mg, nasogastric tube, BID  esomeprazole, 40 mg, nasogastric tube, Daily  fludrocortisone, 0.1 mg, nasogastric tube, q12h  heparin (porcine), 5,000 Units, subcutaneous, q8h  hydrocortisone sodium succinate, 50  mg, intravenous, q6h  insulin glargine, 12 Units, subcutaneous, q12h  insulin lispro, 0-20 Units, subcutaneous, q4h  meropenem, 1 g, intravenous, q12h  micafungin, 100 mg, intravenous, q24h  midodrine, 20 mg, orogastric tube, q8h  oxygen, , inhalation, Continuous - Inhalation  sodium phosphate, 21 mmol, intravenous, Once  thiamine, 500 mg, nasogastric tube, q8h  vancomycin, 7.5 mg/kg, intravenous, q12h      Continuous medications  lactated Ringer's, 5 mL/hr, Last Rate: 5 mL/hr (04/22/24 0700)  norepinephrine, 0.01-0.08 mcg/kg/min (Dosing Weight), Last Rate: 0.07 mcg/kg/min (04/23/24 0818)  PrismaSol 4/2.5, 3,000 mL/hr, Last Rate: 3,000 mL/hr (04/23/24 0843)      PRN medications  PRN medications: acetaminophen, alteplase, alteplase, calcium gluconate, calcium gluconate, dextrose, glucagon, heparin, heparin, HYDROmorphone, HYDROmorphone, labetaloL, magnesium sulfate, magnesium sulfate, midodrine, sodium chloride, vancomycin     Assessment   Jason Hollins 63 y.o. 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 Hawkins and Dr. Smith on 3/5/24. Postoperative course c/b arrest requiring CPR with ROSC after TPA for assumed large PE on 3/20. Increased swelling at flap site appreciated overnight 3/20-3/21 and pt returned to OR for exploration of R flap site, evacuation of hematoma, suture ligation of multiple bleeding vessel sites in gluteal area and wound closure with Dr. Smith on 3/21. Pt has remained in ICU for continued critical care evaluation and management postoperatively. Returned to OR 4/11 with plastic surgery for R posterior thigh/gluteal region I&D with tissue advancement/complex closure and application of incisional wound vac. Overnight on 4/18, pt with interval c/f bleeding recurrence at R thigh/gluteal region surgical  site. S/P I&D of right hip with evacuation of hematoma, closure and incisional wound vac replacement with Dr. Smith on evening of 4/18.     Plan/Recommendations  - Appreciate ICU further evaluation into clinical status change on 4/21 (marginal temperature, rise in WBC count, lactic and pressor requirement with somnolence on exam)       ·  Cultured drainage from trach site       ·  White count downtrended to 10.2 this AM       ·  Blood cultures NGTD       ·  Pt remaining afebrile   - 4/22 Vascular US notable for superficial thromboses of mid cephalic vein and soleal vein       ·  Plastics recommend careful anticoagulation due to recent history of rebleeding       ·  Heparin 5000 Q8 with close monitoring of flap site and drain output        ·  INR 1.1 and PT 12.1 early this AM, continue to trend       ·  OK for IVC filter from plastics perspective  - Maintain incisional wound vac to R posterior thigh wound site per plastic surgery x14 (5/2) days post-op         ·  Settings: -50 mmHg low continuous suction        ·  Monitor/record vac canister output [id number]       ·  Notify plastic surgery with any concerns of leak or obstruction  - Continue ANDERSON drain care to x3 drains present at R thigh flap reconstruction site      ·  Strip drain tubing TID and PRN     ·  Monitor and record output q8h      ·  Keep drain sites C/D/I      ·  Notify plastics if ANDERSON drain output exceeds > 300 ml/hr (continuing to monitor closely)   - Avoid pressure application or positioning onto flap site or incisions at R upper leg   - Recommend patient be positioned off of R side as able to prevent flap compression/wound breakdown   - Continue clinitron fluidized air mattress  - Plastic Surgery will continue to follow     Patient and plan discussed with Dr. Smith.     Peggy Dubois PA-C  Plastic and Reconstructive Surgery   Pager #[phone]  Team phone: [phone]

## 2024-04-23 NOTE — PROCEDURES
[facility] note    Seen and examined on [facility]. Labs and events reviewed     Vascular access:  [facility] non-TDC   BFR :200 ml/min  Filter: M150  Replacement solution: Prismasol 4K/2.5Ca  Replacement Rate: 3000 ml/hour  1000/pump/1000/filter/1000    Fluid removal: per ICU team   No change in [facility] prescription for next 24 h.       Intake/Output Summary (Last 24 hours) at 4/23/2024 1640  Last data filed at 4/23/2024 1300  Gross per 24 hour   Intake 2366.54 ml   Output 6567 ml   Net -4200.46 ml     Heart Rate:  [69-89]   Temp:  [35.1 °C (95.2 °F)-35.8 °C (96.4 °F)]   Resp:  [0-32]   BP: ()/(52-80)   SpO2:  [90 %-100 %]     Scheduled medications  albumin human, 25 g, intravenous, q6h  docusate sodium, 100 mg, nasogastric tube, BID  esomeprazole, 40 mg, nasogastric tube, Daily  fludrocortisone, 0.1 mg, nasogastric tube, q12h  heparin (porcine), 5,000 Units, subcutaneous, q8h  hydrocortisone sodium succinate, 50 mg, intravenous, q6h  insulin glargine, 12 Units, subcutaneous, q12h  insulin lispro, 0-20 Units, subcutaneous, q4h  meropenem, 1 g, intravenous, q12h  micafungin, 100 mg, intravenous, q24h  midodrine, 20 mg, orogastric tube, q8h  oxygen, , inhalation, Continuous - Inhalation  sodium phosphate, 21 mmol, intravenous, Once  thiamine, 500 mg, nasogastric tube, q8h      Continuous medications  lactated Ringer's, 5 mL/hr, Last Rate: 5 mL/hr (04/22/24 0700)  norepinephrine, 0.01-1 mcg/kg/min (Dosing Weight), Last Rate: 0.09 mcg/kg/min (04/23/24 1500)  PrismaSol 4/2.5, 3,000 mL/hr, Last Rate: 3,000 mL/hr (04/23/24 0843)  vasopressin, 0.01-0.03 Units/min      PRN medications  PRN medications: acetaminophen, alteplase, alteplase, calcium gluconate, calcium gluconate, dextrose, glucagon, heparin, heparin, HYDROmorphone, HYDROmorphone, labetaloL, magnesium sulfate, magnesium sulfate, midodrine, sodium chloride    Recent Results (from the past 24 hour(s))   POCT GLUCOSE    Collection Time: 04/22/24  4:48 PM   Result Value Ref Range     POCT Glucose 186 (H) 74 - 99 mg/dL   POCT GLUCOSE    Collection Time: 04/22/24  7:29 PM   Result Value Ref Range    POCT Glucose 180 (H) 74 - 99 mg/dL   POCT GLUCOSE    Collection Time: 04/22/24 11:43 PM   Result Value Ref Range    POCT Glucose 138 (H) 74 - 99 mg/dL   Magnesium    Collection Time: 04/23/24  2:39 AM   Result Value Ref Range    Magnesium 2.67 (H) 1.60 - 2.40 mg/dL   Hepatic function panel    Collection Time: 04/23/24  2:39 AM   Result Value Ref Range    Albumin 3.7 3.4 - 5.0 g/dL    Bilirubin, Total 24.9 (H) 0.0 - 1.2 mg/dL    Bilirubin, Direct 16.8 (H) 0.0 - 0.3 mg/dL    Alkaline Phosphatase 169 (H) 33 - 136 U/L    ALT 11 10 - 52 U/L    AST 82 (H) 9 - 39 U/L    Total Protein 5.4 (L) 6.4 - 8.2 g/dL   Coagulation Screen    Collection Time: 04/23/24  2:39 AM   Result Value Ref Range    Protime 12.1 9.8 - 12.8 seconds    INR 1.1 0.9 - 1.1    aPTT 42 (H) 27 - 38 seconds   CBC    Collection Time: 04/23/24  2:39 AM   Result Value Ref Range    WBC 10.2 4.4 - 11.3 x10*3/uL    nRBC 1.5 (H) 0.0 - 0.0 /100 WBCs    RBC 3.50 (L) 4.50 - 5.90 x10*6/uL    Hemoglobin 9.8 (L) 13.5 - 17.5 g/dL    Hematocrit 28.9 (L) 41.0 - 52.0 %    MCV 83 80 - 100 fL    MCH 28.0 26.0 - 34.0 pg    MCHC 33.9 32.0 - 36.0 g/dL    RDW 21.2 (H) 11.5 - 14.5 %    Platelets 131 (L) 150 - 450 x10*3/uL   Calcium, ionized    Collection Time: 04/23/24  2:39 AM   Result Value Ref Range    POCT Calcium, Ionized 1.17 1.1 - 1.33 mmol/L   Blood Gas Arterial Full Panel    Collection Time: 04/23/24  2:39 AM   Result Value Ref Range    POCT pH, Arterial 7.41 7.38 - 7.42 pH    POCT pCO2, Arterial 34 (L) 38 - 42 mm Hg    POCT pO2, Arterial 253 (H) 85 - 95 mm Hg    POCT SO2, Arterial 100 94 - 100 %    POCT Oxy Hemoglobin, Arterial 97.7 94.0 - 98.0 %    POCT Hematocrit Calculated, Arterial 31.0 (L) 41.0 - 52.0 %    POCT Sodium, Arterial 131 (L) 136 - 145 mmol/L    POCT Potassium, Arterial 4.5 3.5 - 5.3 mmol/L    POCT Chloride, Arterial 102 98 - 107 mmol/L     POCT Ionized Calcium, Arterial 1.20 1.10 - 1.33 mmol/L    POCT Glucose, Arterial 208 (H) 74 - 99 mg/dL    POCT Lactate, Arterial 2.3 (H) 0.4 - 2.0 mmol/L    POCT Base Excess, Arterial -2.5 (L) -2.0 - 3.0 mmol/L    POCT HCO3 Calculated, Arterial 21.6 (L) 22.0 - 26.0 mmol/L    POCT Hemoglobin, Arterial 10.4 (L) 13.5 - 17.5 g/dL    POCT Anion Gap, Arterial 12 10 - 25 mmo/L    Patient Temperature 37.0 degrees Celsius    FiO2 50 %   Basic Metabolic Panel    Collection Time: 04/23/24  2:39 AM   Result Value Ref Range    Glucose 204 (H) 74 - 99 mg/dL    Sodium 135 (L) 136 - 145 mmol/L    Potassium 4.7 3.5 - 5.3 mmol/L    Chloride 101 98 - 107 mmol/L    Bicarbonate 21 21 - 32 mmol/L    Anion Gap 18 10 - 20 mmol/L    Urea Nitrogen 31 (H) 6 - 23 mg/dL    Creatinine 0.99 0.50 - 1.30 mg/dL    eGFR 86 >60 mL/min/1.73m*2    Calcium 9.0 8.6 - 10.6 mg/dL   Phosphorus    Collection Time: 04/23/24  2:39 AM   Result Value Ref Range    Phosphorus 2.3 (L) 2.5 - 4.9 mg/dL   Blood Gas Arterial Full Panel    Collection Time: 04/23/24  5:30 AM   Result Value Ref Range    POCT pH, Arterial 7.44 (H) 7.38 - 7.42 pH    POCT pCO2, Arterial 30 (L) 38 - 42 mm Hg    POCT pO2, Arterial 115 (H) 85 - 95 mm Hg    POCT SO2, Arterial 99 94 - 100 %    POCT Oxy Hemoglobin, Arterial 96.6 94.0 - 98.0 %    POCT Hematocrit Calculated, Arterial 32.0 (L) 41.0 - 52.0 %    POCT Sodium, Arterial 131 (L) 136 - 145 mmol/L    POCT Potassium, Arterial 4.3 3.5 - 5.3 mmol/L    POCT Chloride, Arterial 103 98 - 107 mmol/L    POCT Ionized Calcium, Arterial 1.14 1.10 - 1.33 mmol/L    POCT Glucose, Arterial 201 (H) 74 - 99 mg/dL    POCT Lactate, Arterial 2.4 (H) 0.4 - 2.0 mmol/L    POCT Base Excess, Arterial -3.0 (L) -2.0 - 3.0 mmol/L    POCT HCO3 Calculated, Arterial 20.4 (L) 22.0 - 26.0 mmol/L    POCT Hemoglobin, Arterial 10.5 (L) 13.5 - 17.5 g/dL    POCT Anion Gap, Arterial 12 10 - 25 mmo/L    Patient Temperature 37.0 degrees Celsius    FiO2 50 %   POCT GLUCOSE     Collection Time: 04/23/24  8:09 AM   Result Value Ref Range    POCT Glucose 191 (H) 74 - 99 mg/dL   Vancomycin, Trough Pls don't collect too early. Collect before Vancomycin PM dose    Collection Time: 04/23/24 11:46 AM   Result Value Ref Range    Vancomycin, Trough 14.2 5.0 - 20.0 ug/mL   POCT GLUCOSE    Collection Time: 04/23/24 11:49 AM   Result Value Ref Range    POCT Glucose 168 (H) 74 - 99 mg/dL   POCT GLUCOSE    Collection Time: 04/23/24  3:40 PM   Result Value Ref Range    POCT Glucose 140 (H) 74 - 99 mg/dL

## 2024-04-23 NOTE — PROGRESS NOTES
Vancomycin Dosing by Pharmacy- Cessation of Therapy    Consult to pharmacy for vancomycin dosing has been discontinued by the prescriber, pharmacy will sign off at this time.    Please call pharmacy if there are further questions or re-enter a consult if vancomycin is resumed.     Lynn Leger, PharmD

## 2024-04-24 ENCOUNTER — APPOINTMENT (OUTPATIENT)
Dept: RADIOLOGY | Facility: HOSPITAL | Age: 64
DRG: 003 | End: 2024-04-24
Payer: COMMERCIAL

## 2024-04-24 LAB
ALBUMIN SERPL BCP-MCNC: 4 G/DL (ref 3.4–5)
ALBUMIN SERPL BCP-MCNC: 4 G/DL (ref 3.4–5)
ALP SERPL-CCNC: 160 U/L (ref 33–136)
ALT SERPL W P-5'-P-CCNC: 13 U/L (ref 10–52)
ANION GAP BLDA CALCULATED.4IONS-SCNC: 12 MMO/L (ref 10–25)
ANION GAP BLDA CALCULATED.4IONS-SCNC: 13 MMO/L (ref 10–25)
ANION GAP BLDA CALCULATED.4IONS-SCNC: 14 MMO/L (ref 10–25)
ANION GAP SERPL CALC-SCNC: 16 MMOL/L (ref 10–20)
ANION GAP SERPL CALC-SCNC: 17 MMOL/L (ref 10–20)
APTT PPP: 47 SECONDS (ref 27–38)
AST SERPL W P-5'-P-CCNC: 89 U/L (ref 9–39)
BASE EXCESS BLDA CALC-SCNC: -0.8 MMOL/L (ref -2–3)
BASE EXCESS BLDA CALC-SCNC: -1.8 MMOL/L (ref -2–3)
BASE EXCESS BLDA CALC-SCNC: -1.9 MMOL/L (ref -2–3)
BILIRUB DIRECT SERPL-MCNC: 17.1 MG/DL (ref 0–0.3)
BILIRUB SERPL-MCNC: 26.7 MG/DL (ref 0–1.2)
BODY TEMPERATURE: 37 DEGREES CELSIUS
BUN SERPL-MCNC: 25 MG/DL (ref 6–23)
BUN SERPL-MCNC: 27 MG/DL (ref 6–23)
C DIF TOX TCDA+TCDB STL QL NAA+PROBE: NOT DETECTED
CA-I BLD-SCNC: 1.17 MMOL/L (ref 1.1–1.33)
CA-I BLDA-SCNC: 1.16 MMOL/L (ref 1.1–1.33)
CA-I BLDA-SCNC: 1.21 MMOL/L (ref 1.1–1.33)
CA-I BLDA-SCNC: 1.22 MMOL/L (ref 1.1–1.33)
CALCIUM SERPL-MCNC: 9 MG/DL (ref 8.6–10.6)
CALCIUM SERPL-MCNC: 9.2 MG/DL (ref 8.6–10.6)
CHLORIDE BLDA-SCNC: 100 MMOL/L (ref 98–107)
CHLORIDE BLDA-SCNC: 101 MMOL/L (ref 98–107)
CHLORIDE BLDA-SCNC: 101 MMOL/L (ref 98–107)
CHLORIDE SERPL-SCNC: 100 MMOL/L (ref 98–107)
CHLORIDE SERPL-SCNC: 99 MMOL/L (ref 98–107)
CO2 SERPL-SCNC: 23 MMOL/L (ref 21–32)
CO2 SERPL-SCNC: 25 MMOL/L (ref 21–32)
CREAT SERPL-MCNC: 0.84 MG/DL (ref 0.5–1.3)
CREAT SERPL-MCNC: 0.84 MG/DL (ref 0.5–1.3)
EGFRCR SERPLBLD CKD-EPI 2021: >90 ML/MIN/1.73M*2
EGFRCR SERPLBLD CKD-EPI 2021: >90 ML/MIN/1.73M*2
ERYTHROCYTE [DISTWIDTH] IN BLOOD BY AUTOMATED COUNT: 19.9 % (ref 11.5–14.5)
ERYTHROCYTE [DISTWIDTH] IN BLOOD BY AUTOMATED COUNT: 21 % (ref 11.5–14.5)
GLUCOSE BLD MANUAL STRIP-MCNC: 130 MG/DL (ref 74–99)
GLUCOSE BLD MANUAL STRIP-MCNC: 131 MG/DL (ref 74–99)
GLUCOSE BLD MANUAL STRIP-MCNC: 132 MG/DL (ref 74–99)
GLUCOSE BLD MANUAL STRIP-MCNC: 138 MG/DL (ref 74–99)
GLUCOSE BLD MANUAL STRIP-MCNC: 142 MG/DL (ref 74–99)
GLUCOSE BLD MANUAL STRIP-MCNC: 149 MG/DL (ref 74–99)
GLUCOSE BLDA-MCNC: 123 MG/DL (ref 74–99)
GLUCOSE BLDA-MCNC: 126 MG/DL (ref 74–99)
GLUCOSE BLDA-MCNC: 129 MG/DL (ref 74–99)
GLUCOSE SERPL-MCNC: 122 MG/DL (ref 74–99)
GLUCOSE SERPL-MCNC: 136 MG/DL (ref 74–99)
HCO3 BLDA-SCNC: 21.9 MMOL/L (ref 22–26)
HCO3 BLDA-SCNC: 21.9 MMOL/L (ref 22–26)
HCO3 BLDA-SCNC: 23.2 MMOL/L (ref 22–26)
HCT VFR BLD AUTO: 27.3 % (ref 41–52)
HCT VFR BLD AUTO: 27.4 % (ref 41–52)
HCT VFR BLD EST: 31 % (ref 41–52)
HCT VFR BLD EST: 32 % (ref 41–52)
HCT VFR BLD EST: 35 % (ref 41–52)
HGB BLD-MCNC: 9.8 G/DL (ref 13.5–17.5)
HGB BLD-MCNC: 9.9 G/DL (ref 13.5–17.5)
HGB BLDA-MCNC: 10.2 G/DL (ref 13.5–17.5)
HGB BLDA-MCNC: 10.7 G/DL (ref 13.5–17.5)
HGB BLDA-MCNC: 11.6 G/DL (ref 13.5–17.5)
INHALED O2 CONCENTRATION: 40 %
INHALED O2 CONCENTRATION: 50 %
INHALED O2 CONCENTRATION: 50 %
INR PPP: 1.2 (ref 0.9–1.1)
LACTATE BLDA-SCNC: 2.2 MMOL/L (ref 0.4–2)
LACTATE BLDA-SCNC: 2.2 MMOL/L (ref 0.4–2)
LACTATE BLDA-SCNC: 2.5 MMOL/L (ref 0.4–2)
MAGNESIUM SERPL-MCNC: 2.55 MG/DL (ref 1.6–2.4)
MAGNESIUM SERPL-MCNC: 2.72 MG/DL (ref 1.6–2.4)
MCH RBC QN AUTO: 28.9 PG (ref 26–34)
MCH RBC QN AUTO: 29.3 PG (ref 26–34)
MCHC RBC AUTO-ENTMCNC: 35.8 G/DL (ref 32–36)
MCHC RBC AUTO-ENTMCNC: 36.3 G/DL (ref 32–36)
MCV RBC AUTO: 81 FL (ref 80–100)
MCV RBC AUTO: 81 FL (ref 80–100)
NRBC BLD-RTO: 2.9 /100 WBCS (ref 0–0)
NRBC BLD-RTO: 4.7 /100 WBCS (ref 0–0)
OXYHGB MFR BLDA: 93.2 % (ref 94–98)
OXYHGB MFR BLDA: 95.7 % (ref 94–98)
OXYHGB MFR BLDA: 96.6 % (ref 94–98)
PCO2 BLDA: 33 MM HG (ref 38–42)
PCO2 BLDA: 33 MM HG (ref 38–42)
PCO2 BLDA: 35 MM HG (ref 38–42)
PH BLDA: 7.43 PH (ref 7.38–7.42)
PHOSPHATE SERPL-MCNC: 1.9 MG/DL (ref 2.5–4.9)
PHOSPHATE SERPL-MCNC: 2.8 MG/DL (ref 2.5–4.9)
PLATELET # BLD AUTO: 101 X10*3/UL (ref 150–450)
PLATELET # BLD AUTO: 93 X10*3/UL (ref 150–450)
PO2 BLDA: 103 MM HG (ref 85–95)
PO2 BLDA: 68 MM HG (ref 85–95)
PO2 BLDA: 85 MM HG (ref 85–95)
POTASSIUM BLDA-SCNC: 3.7 MMOL/L (ref 3.5–5.3)
POTASSIUM BLDA-SCNC: 4.1 MMOL/L (ref 3.5–5.3)
POTASSIUM BLDA-SCNC: 4.1 MMOL/L (ref 3.5–5.3)
POTASSIUM SERPL-SCNC: 4 MMOL/L (ref 3.5–5.3)
POTASSIUM SERPL-SCNC: 4.3 MMOL/L (ref 3.5–5.3)
PROT SERPL-MCNC: 6 G/DL (ref 6.4–8.2)
PROTHROMBIN TIME: 13.8 SECONDS (ref 9.8–12.8)
RBC # BLD AUTO: 3.38 X10*6/UL (ref 4.5–5.9)
RBC # BLD AUTO: 3.39 X10*6/UL (ref 4.5–5.9)
SAO2 % BLDA: 95 % (ref 94–100)
SAO2 % BLDA: 99 % (ref 94–100)
SAO2 % BLDA: 99 % (ref 94–100)
SODIUM BLDA-SCNC: 132 MMOL/L (ref 136–145)
SODIUM SERPL-SCNC: 135 MMOL/L (ref 136–145)
SODIUM SERPL-SCNC: 137 MMOL/L (ref 136–145)
WBC # BLD AUTO: 10.4 X10*3/UL (ref 4.4–11.3)
WBC # BLD AUTO: 11.2 X10*3/UL (ref 4.4–11.3)

## 2024-04-24 PROCEDURE — 2500000004 HC RX 250 GENERAL PHARMACY W/ HCPCS (ALT 636 FOR OP/ED): Performed by: STUDENT IN AN ORGANIZED HEALTH CARE EDUCATION/TRAINING PROGRAM

## 2024-04-24 PROCEDURE — 82330 ASSAY OF CALCIUM: CPT | Performed by: STUDENT IN AN ORGANIZED HEALTH CARE EDUCATION/TRAINING PROGRAM

## 2024-04-24 PROCEDURE — 84132 ASSAY OF SERUM POTASSIUM: CPT | Performed by: STUDENT IN AN ORGANIZED HEALTH CARE EDUCATION/TRAINING PROGRAM

## 2024-04-24 PROCEDURE — P9047 ALBUMIN (HUMAN), 25%, 50ML: HCPCS | Mod: JZ | Performed by: PHYSICIAN ASSISTANT

## 2024-04-24 PROCEDURE — 85610 PROTHROMBIN TIME: CPT | Performed by: STUDENT IN AN ORGANIZED HEALTH CARE EDUCATION/TRAINING PROGRAM

## 2024-04-24 PROCEDURE — 87493 C DIFF AMPLIFIED PROBE: CPT | Performed by: PHYSICIAN ASSISTANT

## 2024-04-24 PROCEDURE — 97530 THERAPEUTIC ACTIVITIES: CPT | Mod: GP | Performed by: PHYSICAL THERAPIST

## 2024-04-24 PROCEDURE — 84100 ASSAY OF PHOSPHORUS: CPT | Performed by: STUDENT IN AN ORGANIZED HEALTH CARE EDUCATION/TRAINING PROGRAM

## 2024-04-24 PROCEDURE — 2500000001 HC RX 250 WO HCPCS SELF ADMINISTERED DRUGS (ALT 637 FOR MEDICARE OP)

## 2024-04-24 PROCEDURE — 70371 SPEECH EVALUATION COMPLEX: CPT | Performed by: SPEECH-LANGUAGE PATHOLOGIST

## 2024-04-24 PROCEDURE — 2500000004 HC RX 250 GENERAL PHARMACY W/ HCPCS (ALT 636 FOR OP/ED)

## 2024-04-24 PROCEDURE — 2500000005 HC RX 250 GENERAL PHARMACY W/O HCPCS: Performed by: STUDENT IN AN ORGANIZED HEALTH CARE EDUCATION/TRAINING PROGRAM

## 2024-04-24 PROCEDURE — 97110 THERAPEUTIC EXERCISES: CPT | Mod: GO

## 2024-04-24 PROCEDURE — 2500000004 HC RX 250 GENERAL PHARMACY W/ HCPCS (ALT 636 FOR OP/ED): Mod: JZ | Performed by: PHYSICIAN ASSISTANT

## 2024-04-24 PROCEDURE — 90945 DIALYSIS ONE EVALUATION: CPT | Performed by: INTERNAL MEDICINE

## 2024-04-24 PROCEDURE — 99231 SBSQ HOSP IP/OBS SF/LOW 25: CPT | Performed by: STUDENT IN AN ORGANIZED HEALTH CARE EDUCATION/TRAINING PROGRAM

## 2024-04-24 PROCEDURE — 71045 X-RAY EXAM CHEST 1 VIEW: CPT | Performed by: RADIOLOGY

## 2024-04-24 PROCEDURE — 2020000001 HC ICU ROOM DAILY

## 2024-04-24 PROCEDURE — 2500000001 HC RX 250 WO HCPCS SELF ADMINISTERED DRUGS (ALT 637 FOR MEDICARE OP): Performed by: NURSE PRACTITIONER

## 2024-04-24 PROCEDURE — 2500000001 HC RX 250 WO HCPCS SELF ADMINISTERED DRUGS (ALT 637 FOR MEDICARE OP): Performed by: STUDENT IN AN ORGANIZED HEALTH CARE EDUCATION/TRAINING PROGRAM

## 2024-04-24 PROCEDURE — 99291 CRITICAL CARE FIRST HOUR: CPT | Performed by: EMERGENCY MEDICINE

## 2024-04-24 PROCEDURE — 82947 ASSAY GLUCOSE BLOOD QUANT: CPT

## 2024-04-24 PROCEDURE — 94003 VENT MGMT INPAT SUBQ DAY: CPT

## 2024-04-24 PROCEDURE — 82248 BILIRUBIN DIRECT: CPT | Performed by: STUDENT IN AN ORGANIZED HEALTH CARE EDUCATION/TRAINING PROGRAM

## 2024-04-24 PROCEDURE — 71045 X-RAY EXAM CHEST 1 VIEW: CPT

## 2024-04-24 PROCEDURE — C9113 INJ PANTOPRAZOLE SODIUM, VIA: HCPCS | Performed by: STUDENT IN AN ORGANIZED HEALTH CARE EDUCATION/TRAINING PROGRAM

## 2024-04-24 PROCEDURE — 37799 UNLISTED PX VASCULAR SURGERY: CPT | Performed by: STUDENT IN AN ORGANIZED HEALTH CARE EDUCATION/TRAINING PROGRAM

## 2024-04-24 PROCEDURE — 83735 ASSAY OF MAGNESIUM: CPT | Performed by: STUDENT IN AN ORGANIZED HEALTH CARE EDUCATION/TRAINING PROGRAM

## 2024-04-24 PROCEDURE — 97530 THERAPEUTIC ACTIVITIES: CPT | Mod: GO

## 2024-04-24 PROCEDURE — 2500000005 HC RX 250 GENERAL PHARMACY W/O HCPCS: Performed by: PHYSICIAN ASSISTANT

## 2024-04-24 PROCEDURE — 2500000004 HC RX 250 GENERAL PHARMACY W/ HCPCS (ALT 636 FOR OP/ED): Performed by: NURSE PRACTITIONER

## 2024-04-24 PROCEDURE — 85027 COMPLETE CBC AUTOMATED: CPT | Performed by: STUDENT IN AN ORGANIZED HEALTH CARE EDUCATION/TRAINING PROGRAM

## 2024-04-24 PROCEDURE — 99232 SBSQ HOSP IP/OBS MODERATE 35: CPT

## 2024-04-24 RX ORDER — PANTOPRAZOLE SODIUM 40 MG/10ML
40 INJECTION, POWDER, LYOPHILIZED, FOR SOLUTION INTRAVENOUS 2 TIMES DAILY
Status: DISCONTINUED | OUTPATIENT
Start: 2024-04-24 | End: 2024-04-26

## 2024-04-24 RX ADMIN — THIAMINE HCL TAB 100 MG 500 MG: 100 TAB at 00:53

## 2024-04-24 RX ADMIN — MEROPENEM 1 G: 1 INJECTION, POWDER, FOR SOLUTION INTRAVENOUS at 08:19

## 2024-04-24 RX ADMIN — HEPARIN SODIUM 5000 UNITS: 5000 INJECTION INTRAVENOUS; SUBCUTANEOUS at 05:43

## 2024-04-24 RX ADMIN — CALCIUM CHLORIDE, MAGNESIUM CHLORIDE, DEXTROSE MONOHYDRATE, LACTIC ACID, SODIUM CHLORIDE, SODIUM BICARBONATE AND POTASSIUM CHLORIDE 3000 ML/HR: 3.68; 3.05; 22; 5.4; 6.46; 3.09; .314 INJECTION INTRAVENOUS at 10:23

## 2024-04-24 RX ADMIN — HYDROCORTISONE SODIUM SUCCINATE 50 MG: 100 INJECTION, POWDER, FOR SOLUTION INTRAMUSCULAR; INTRAVENOUS at 00:11

## 2024-04-24 RX ADMIN — CALCIUM CHLORIDE, MAGNESIUM CHLORIDE, DEXTROSE MONOHYDRATE, LACTIC ACID, SODIUM CHLORIDE, SODIUM BICARBONATE AND POTASSIUM CHLORIDE 3000 ML/HR: 3.68; 3.05; 22; 5.4; 6.46; 3.09; .314 INJECTION INTRAVENOUS at 10:21

## 2024-04-24 RX ADMIN — MIDODRINE HYDROCHLORIDE 20 MG: 10 TABLET ORAL at 08:46

## 2024-04-24 RX ADMIN — SODIUM PHOSPHATE, MONOBASIC, MONOHYDRATE AND SODIUM PHOSPHATE, DIBASIC, ANHYDROUS 21 MMOL: 142; 276 INJECTION, SOLUTION INTRAVENOUS at 08:18

## 2024-04-24 RX ADMIN — THIAMINE HCL TAB 100 MG 500 MG: 100 TAB at 17:58

## 2024-04-24 RX ADMIN — HYDROCORTISONE SODIUM SUCCINATE 50 MG: 100 INJECTION, POWDER, FOR SOLUTION INTRAMUSCULAR; INTRAVENOUS at 12:14

## 2024-04-24 RX ADMIN — ESOMEPRAZOLE MAGNESIUM 40 MG: 40 FOR SUSPENSION ORAL at 05:38

## 2024-04-24 RX ADMIN — MIDODRINE HYDROCHLORIDE 20 MG: 10 TABLET ORAL at 17:57

## 2024-04-24 RX ADMIN — CALCIUM CHLORIDE, MAGNESIUM CHLORIDE, DEXTROSE MONOHYDRATE, LACTIC ACID, SODIUM CHLORIDE, SODIUM BICARBONATE AND POTASSIUM CHLORIDE 3000 ML/HR: 3.68; 3.05; 22; 5.4; 6.46; 3.09; .314 INJECTION INTRAVENOUS at 00:37

## 2024-04-24 RX ADMIN — CALCIUM CHLORIDE, MAGNESIUM CHLORIDE, DEXTROSE MONOHYDRATE, LACTIC ACID, SODIUM CHLORIDE, SODIUM BICARBONATE AND POTASSIUM CHLORIDE 3000 ML/HR: 3.68; 3.05; 22; 5.4; 6.46; 3.09; .314 INJECTION INTRAVENOUS at 00:39

## 2024-04-24 RX ADMIN — CALCIUM CHLORIDE, MAGNESIUM CHLORIDE, DEXTROSE MONOHYDRATE, LACTIC ACID, SODIUM CHLORIDE, SODIUM BICARBONATE AND POTASSIUM CHLORIDE 3000 ML/HR: 3.68; 3.05; 22; 5.4; 6.46; 3.09; .314 INJECTION INTRAVENOUS at 15:44

## 2024-04-24 RX ADMIN — CALCIUM CHLORIDE, MAGNESIUM CHLORIDE, DEXTROSE MONOHYDRATE, LACTIC ACID, SODIUM CHLORIDE, SODIUM BICARBONATE AND POTASSIUM CHLORIDE 3000 ML/HR: 3.68; 3.05; 22; 5.4; 6.46; 3.09; .314 INJECTION INTRAVENOUS at 10:22

## 2024-04-24 RX ADMIN — VASOPRESSIN 0.03 UNITS/MIN: 0.2 INJECTION INTRAVENOUS at 14:10

## 2024-04-24 RX ADMIN — HEPARIN SODIUM 5000 UNITS: 5000 INJECTION INTRAVENOUS; SUBCUTANEOUS at 12:14

## 2024-04-24 RX ADMIN — ALBUMIN HUMAN 25 G: 0.25 SOLUTION INTRAVENOUS at 05:38

## 2024-04-24 RX ADMIN — INSULIN GLARGINE 12 UNITS: 100 INJECTION, SOLUTION SUBCUTANEOUS at 09:02

## 2024-04-24 RX ADMIN — CALCIUM CHLORIDE, MAGNESIUM CHLORIDE, DEXTROSE MONOHYDRATE, LACTIC ACID, SODIUM CHLORIDE, SODIUM BICARBONATE AND POTASSIUM CHLORIDE 3000 ML/HR: 3.68; 3.05; 22; 5.4; 6.46; 3.09; .314 INJECTION INTRAVENOUS at 15:43

## 2024-04-24 RX ADMIN — HYDROCORTISONE SODIUM SUCCINATE 50 MG: 100 INJECTION, POWDER, FOR SOLUTION INTRAMUSCULAR; INTRAVENOUS at 05:41

## 2024-04-24 RX ADMIN — FLUDROCORTISONE ACETATE 0.1 MG: 0.1 TABLET ORAL at 15:14

## 2024-04-24 RX ADMIN — HYDROCORTISONE SODIUM SUCCINATE 50 MG: 100 INJECTION, POWDER, FOR SOLUTION INTRAMUSCULAR; INTRAVENOUS at 17:58

## 2024-04-24 RX ADMIN — MIDODRINE HYDROCHLORIDE 20 MG: 10 TABLET ORAL at 00:53

## 2024-04-24 RX ADMIN — CALCIUM CHLORIDE, MAGNESIUM CHLORIDE, DEXTROSE MONOHYDRATE, LACTIC ACID, SODIUM CHLORIDE, SODIUM BICARBONATE AND POTASSIUM CHLORIDE 3000 ML/HR: 3.68; 3.05; 22; 5.4; 6.46; 3.09; .314 INJECTION INTRAVENOUS at 00:38

## 2024-04-24 RX ADMIN — Medication 40 PERCENT: at 20:00

## 2024-04-24 RX ADMIN — HYDROCORTISONE SODIUM SUCCINATE 50 MG: 100 INJECTION, POWDER, FOR SOLUTION INTRAMUSCULAR; INTRAVENOUS at 23:04

## 2024-04-24 RX ADMIN — Medication 50 PERCENT: at 08:00

## 2024-04-24 RX ADMIN — THIAMINE HCL TAB 100 MG 500 MG: 100 TAB at 08:46

## 2024-04-24 RX ADMIN — FLUDROCORTISONE ACETATE 0.1 MG: 0.1 TABLET ORAL at 05:38

## 2024-04-24 RX ADMIN — MEROPENEM 1 G: 1 INJECTION, POWDER, FOR SOLUTION INTRAVENOUS at 20:43

## 2024-04-24 RX ADMIN — HEPARIN SODIUM 5000 UNITS: 5000 INJECTION INTRAVENOUS; SUBCUTANEOUS at 20:43

## 2024-04-24 RX ADMIN — INSULIN GLARGINE 12 UNITS: 100 INJECTION, SOLUTION SUBCUTANEOUS at 21:10

## 2024-04-24 RX ADMIN — PANTOPRAZOLE SODIUM 40 MG: 40 INJECTION, POWDER, FOR SOLUTION INTRAVENOUS at 20:43

## 2024-04-24 RX ADMIN — NOREPINEPHRINE BITARTRATE 0.08 MCG/KG/MIN: 8 INJECTION, SOLUTION INTRAVENOUS at 20:45

## 2024-04-24 RX ADMIN — CALCIUM CHLORIDE, MAGNESIUM CHLORIDE, DEXTROSE MONOHYDRATE, LACTIC ACID, SODIUM CHLORIDE, SODIUM BICARBONATE AND POTASSIUM CHLORIDE 3000 ML/HR: 3.68; 3.05; 22; 5.4; 6.46; 3.09; .314 INJECTION INTRAVENOUS at 15:42

## 2024-04-24 RX ADMIN — VASOPRESSIN 0.03 UNITS/MIN: 0.2 INJECTION INTRAVENOUS at 02:23

## 2024-04-24 ASSESSMENT — COGNITIVE AND FUNCTIONAL STATUS - GENERAL
EATING MEALS: TOTAL
TOILETING: TOTAL
TURNING FROM BACK TO SIDE WHILE IN FLAT BAD: TOTAL
MOVING FROM LYING ON BACK TO SITTING ON SIDE OF FLAT BED WITH BEDRAILS: TOTAL
DRESSING REGULAR LOWER BODY CLOTHING: TOTAL
MOVING FROM LYING ON BACK TO SITTING ON SIDE OF FLAT BED WITH BEDRAILS: TOTAL
TURNING FROM BACK TO SIDE WHILE IN FLAT BAD: TOTAL
DAILY ACTIVITIY SCORE: 8
HELP NEEDED FOR BATHING: TOTAL
CLIMB 3 TO 5 STEPS WITH RAILING: TOTAL
MOBILITY SCORE: 6
HELP NEEDED FOR BATHING: TOTAL
STANDING UP FROM CHAIR USING ARMS: TOTAL
MOVING TO AND FROM BED TO CHAIR: TOTAL
TOILETING: TOTAL
DAILY ACTIVITIY SCORE: 6
DRESSING REGULAR LOWER BODY CLOTHING: TOTAL
MOVING TO AND FROM BED TO CHAIR: TOTAL
MOBILITY SCORE: 6
CLIMB 3 TO 5 STEPS WITH RAILING: TOTAL
PERSONAL GROOMING: TOTAL
PERSONAL GROOMING: A LOT
WALKING IN HOSPITAL ROOM: TOTAL
DRESSING REGULAR UPPER BODY CLOTHING: A LOT
STANDING UP FROM CHAIR USING ARMS: TOTAL
EATING MEALS: TOTAL
WALKING IN HOSPITAL ROOM: TOTAL
DRESSING REGULAR UPPER BODY CLOTHING: TOTAL

## 2024-04-24 ASSESSMENT — PAIN - FUNCTIONAL ASSESSMENT
PAIN_FUNCTIONAL_ASSESSMENT: 0-10
PAIN_FUNCTIONAL_ASSESSMENT: 0-10

## 2024-04-24 ASSESSMENT — PAIN SCALES - GENERAL
PAINLEVEL_OUTOF10: 0 - NO PAIN
PAINLEVEL_OUTOF10: 0 - NO PAIN

## 2024-04-24 NOTE — PROGRESS NOTES
Physical Therapy    Physical Therapy Treatment    Patient Name: Jason Hollins  MRN: [id number]  Today's Date: 4/24/2024  Time Calculation  Start Time: 1049  Stop Time: 1116  Time Calculation (min): 27 min       Assessment/Plan   PT Assessment  End of Session Communication: Bedside nurse  End of Session Patient Position: Bed, 4 rail up, Alarm off, not on at start of session  PT Plan  Inpatient/Swing Bed or Outpatient: Inpatient  PT Plan  Treatment/Interventions: Bed mobility, Transfer training, Balance training, Neuromuscular re-education, Strengthening, Endurance training, Therapeutic exercise, Therapeutic activity  PT Plan: Skilled PT  PT Frequency: 3 times per week  PT Discharge Recommendations: Moderate intensity level of continued care  Equipment Recommended upon Discharge:  (none)  PT Recommended Transfer Status: Total assist  PT - OK to Discharge: Yes    General Visit Information:   PT  Visit  PT Received On: 04/24/24  General  Family/Caregiver Present: Yes  Co-Treatment: OT  Co-Treatment Reason: AMPAC<10, vent dependent with intent to mobilize, rehab tech also assisted with mobility  Prior to Session Communication: Bedside nurse, Physician  Patient Position Received: Bed, 4 rail up, Alarm off, not on at start of session  General Comment: Pt supine in bed on arrival, agreeable. SIMV 40% FiO2, PEEP 10, PS 12, RR 20, .03 vaso, .06 levo, CV[facility]. Pt s/p IVC filter placement 4/23    Subjective   Precautions:  Precautions  LE Weight Bearing Status: Right Non-Weight Bearing  Medical Precautions: Fall precautions, Oxygen therapy device and L/min  Precautions Comment: MAP 65-90,  no other mobility restrictions following most recent OR 4/11 per plastics team, contact precautions  Vital Signs:  Vital Signs  Heart Rate: 65 (post: 73)  Resp: (!) 32 (post: 26)  SpO2: 95 % (post: 99)  BP: 119/50 (SBP 140s at EOB; Post: 111/50)  MAP (mmHg): 69 (post: 64)    Objective   Pain:  Pain Assessment  Pain Assessment:  (reported back pain,  "not quantified)  Cognition:  Cognition  Overall Cognitive Status: Impaired  Orientation Level:  (with choices, reported \"house\" as place and month as April)  Following Commands:  (Appeared to follow all commands within physical abilities this date)  Cognition Comments: Communicated via head nods and few gestures - appearing to nod head yes for all questions  Postural Control:  Postural Control  Postural Control: Impaired  Static Sitting Balance  Static Sitting-Comment/Number of Minutes: Dep A  Dynamic Sitting Balance  Dynamic Sitting-Comments: Dep A  Static Standing Balance  Static Standing-Comment/Number of Minutes: unable    Activity Tolerance:  Activity Tolerance  Endurance: Decreased tolerance for upright activites  Early Mobility/Exercise Safety Screen: Proceed with mobilization - No exclusion criteria met  Treatments:    Therapeutic Activity  Therapeutic Activity Performed: Yes  Therapeutic Activity 1: EOB sitting x7 mins with Dep A for sitting balance - VSS. While seated, performed LE PROM (with cues for active assist) + AROM for postural exercises including scap retraction, shoulder rolls, and cervical R/L rotation and flex/ext. Appropriate command following throughout.    Bed Mobility  Bed Mobility: Yes  Bed Mobility 2  Bed Mobility  2: Supine to sitting, Sitting to supine  Level of Assistance 2: Dependent (x4)  Bed Mobility Comments 2: draw sheet, HOB elevated    Outcome Measures:  [facility] Basic Mobility  Turning from your back to your side while in a flat bed without using bedrails: Total  Moving from lying on your back to sitting on the side of a flat bed without using bedrails: Total  Moving to and from bed to chair (including a wheelchair): Total  Standing up from a chair using your arms (e.g. wheelchair or bedside chair): Total  To walk in hospital room: Total  Climbing 3-5 steps with railing: Total  Basic Mobility - Total Score: 6    FSS-ICU  Ambulation: Unable to attempt due to weakness  Rolling: " Total assistance (performs 25% or requires another person)  Sitting: Total assistance (performs 25% or requires another person)  Transfer Sit-to-Stand: Unable to perform  Transfer Supine-to-Sit: Total assistance (performs 25% or requires another person)  Total Score: 3    ICU Mobility Screen  Early Mobility/Exercise Safety Screen: Proceed with mobilization - No exclusion criteria met    Education Documentation  Mobility Training, taught by Yamilex Cast, PT at 4/24/2024  1:22 PM.  Learner: Patient  Readiness: Acceptance  Method: Explanation  Response: Demonstrated Understanding, Needs Reinforcement    Education Comments  No comments found.      Encounter Problems       Encounter Problems (Active)       Balance       Pt will demonstrate ability to complete sitting static/dynamic balance activities with, maxAx1, bilateral UE support and no LOB for increase in safety up D/C.  (Progressing)       Start:  04/08/24    Expected End:  05/13/24               Mobility       Pt will demonstrate ability to complete ther ex activities to improve functional strength for increase in independence upon DC.  (Progressing)       Start:  04/08/24    Expected End:  05/13/24            Pt will be able to tolerate >15 minutes of seated activity continuously without seated rest break with stable vitals and RPD </=3/10 and RPE </=13/20 for improved functional mobility  (Progressing)       Start:  04/08/24    Expected End:  05/13/24               PT Transfers       Pt will demonstrated ability to complete bed mobility and sit<>stand transfers with max assistance x2 and use of assistive device to safely. (Progressing)       Start:  04/08/24    Expected End:  05/13/24               Pain - Adult            Yamilex Cast PT, DPT

## 2024-04-24 NOTE — PROGRESS NOTES
"Spiritual Care Visit     visited with the patient's nephew while the patient rested.     Patient's nephew reported the family is doing well overall and their family is doing a good job at supporting one another, making sure the patient's spouse gets rest, and offering care to one another. He reported that right now it seems they are in a \"wait and see\" place as they wait for the patient to get stronger. He reported the teams are doing a good job communicating with their family and keeping them informed.      will continue to follow and provide support. Please reach out with any needs/concerns.     Rev. Socrates Velasquez, Supportive Oncology                                                      "

## 2024-04-24 NOTE — PROGRESS NOTES
"    Department of Plastic and Reconstructive Surgery  Daily Progress Note    Patient Name: Jason Hollins  MRN: [id number]  Date:  04/24/24     Subjective  Found resting in bed, remains critically ill in TSICU. Patient alert and responsive, appropriately gives thumbs up when asked questions. Incisional wound vac in place to right hip without issue overnight. ANDERSON drain outputs remain appropriate, output is dark, bloody serous drainage. Remains trach to vent with vasopressor support, CVV dialysis.     Overnight Events  NAOE    Objective    Vital Signs  /50 Comment: SBP 140s at EOB; Post: 111/50  Pulse 72   Temp 35 °C (95 °F) (Temporal)   Resp (!) 0   Ht 1.778 m (5' 10\")   Wt 135 kg (298 lb 1 oz)   SpO2 100%   BMI 42.77 kg/m²      Physical Exam   Constitutional: Appears critically ill. Lying in bed in surgical ICU, very responsive to verbal stimuli today. Nods/shakes head and thumbs up to questions. Able to track.  ENMT: MMM, dobhoff in R nare.  Cardiovascular: RRR on telemetry monitor.  Respiratory/Thorax: Trach to vent.  Gastrointestinal: Abdomen soft, protuberant.   Musculoskeletal: Able to squeeze bilateral hands, wiggles toes on the left, minimal movement on RLE, effort to move foot observed.   Extremities: Generalized pitting edema. Right thigh and knee edematous. Visible portion of flap recipient site and adjacent anterior R thigh incisions appear stable. Posterior flap incision line and posterior thigh/gluteal region incision dressed with incisional wound vac, maintaining low continuous suction at -50mmHg, no alarms for leak, obstruction or malfunction, no output in collecting canister. ANDERSON drain x 3 right upper leg with appropriate dark red, bloody drainage Neurological: Off sedation, able to respond to commands with head nod and thumbs up  Skin: Edematous, jaundiced, warm.      Diagnostics   Results for orders placed or performed during the hospital encounter of 03/05/24 (from the past 24 hour(s))   POCT " GLUCOSE   Result Value Ref Range    POCT Glucose 140 (H) 74 - 99 mg/dL   Calcium, ionized   Result Value Ref Range    POCT Calcium, Ionized 1.14 1.1 - 1.33 mmol/L   CBC   Result Value Ref Range    WBC 9.1 4.4 - 11.3 x10*3/uL    nRBC 3.5 (H) 0.0 - 0.0 /100 WBCs    RBC 3.18 (L) 4.50 - 5.90 x10*6/uL    Hemoglobin 9.3 (L) 13.5 - 17.5 g/dL    Hematocrit 26.9 (L) 41.0 - 52.0 %    MCV 85 80 - 100 fL    MCH 29.2 26.0 - 34.0 pg    MCHC 34.6 32.0 - 36.0 g/dL    RDW 21.3 (H) 11.5 - 14.5 %    Platelets 110 (L) 150 - 450 x10*3/uL   Coagulation Screen   Result Value Ref Range    Protime 13.6 (H) 9.8 - 12.8 seconds    INR 1.2 (H) 0.9 - 1.1    aPTT 47 (H) 27 - 38 seconds   Hepatic function panel   Result Value Ref Range    Albumin 3.9 3.4 - 5.0 g/dL    Bilirubin, Total 23.7 (H) 0.0 - 1.2 mg/dL    Bilirubin, Direct 15.6 (H) 0.0 - 0.3 mg/dL    Alkaline Phosphatase 156 (H) 33 - 136 U/L    ALT 11 10 - 52 U/L    AST 82 (H) 9 - 39 U/L    Total Protein 5.7 (L) 6.4 - 8.2 g/dL   Magnesium   Result Value Ref Range    Magnesium 2.54 (H) 1.60 - 2.40 mg/dL   Basic Metabolic Panel   Result Value Ref Range    Glucose 131 (H) 74 - 99 mg/dL    Sodium 135 (L) 136 - 145 mmol/L    Potassium 4.3 3.5 - 5.3 mmol/L    Chloride 100 98 - 107 mmol/L    Bicarbonate 22 21 - 32 mmol/L    Anion Gap 17 10 - 20 mmol/L    Urea Nitrogen 32 (H) 6 - 23 mg/dL    Creatinine 0.98 0.50 - 1.30 mg/dL    eGFR 87 >60 mL/min/1.73m*2    Calcium 9.1 8.6 - 10.6 mg/dL   Phosphorus   Result Value Ref Range    Phosphorus 3.0 2.5 - 4.9 mg/dL   Blood Gas Arterial Full Panel   Result Value Ref Range    POCT pH, Arterial 7.42 7.38 - 7.42 pH    POCT pCO2, Arterial 33 (L) 38 - 42 mm Hg    POCT pO2, Arterial 78 (L) 85 - 95 mm Hg    POCT SO2, Arterial 97 94 - 100 %    POCT Oxy Hemoglobin, Arterial 94.3 94.0 - 98.0 %    POCT Hematocrit Calculated, Arterial 29.0 (L) 41.0 - 52.0 %    POCT Sodium, Arterial 132 (L) 136 - 145 mmol/L    POCT Potassium, Arterial 4.3 3.5 - 5.3 mmol/L    POCT  Chloride, Arterial 101 98 - 107 mmol/L    POCT Ionized Calcium, Arterial 1.20 1.10 - 1.33 mmol/L    POCT Glucose, Arterial 123 (H) 74 - 99 mg/dL    POCT Lactate, Arterial 3.0 (H) 0.4 - 2.0 mmol/L    POCT Base Excess, Arterial -2.6 (L) -2.0 - 3.0 mmol/L    POCT HCO3 Calculated, Arterial 21.4 (L) 22.0 - 26.0 mmol/L    POCT Hemoglobin, Arterial 9.6 (L) 13.5 - 17.5 g/dL    POCT Anion Gap, Arterial 14 10 - 25 mmo/L    Patient Temperature 37.0 degrees Celsius    FiO2 50 %   POCT GLUCOSE   Result Value Ref Range    POCT Glucose 130 (H) 74 - 99 mg/dL   Blood Gas Arterial Full Panel   Result Value Ref Range    POCT pH, Arterial 7.43 (H) 7.38 - 7.42 pH    POCT pCO2, Arterial 33 (L) 38 - 42 mm Hg    POCT pO2, Arterial 85 85 - 95 mm Hg    POCT SO2, Arterial 99 94 - 100 %    POCT Oxy Hemoglobin, Arterial 95.7 94.0 - 98.0 %    POCT Hematocrit Calculated, Arterial 31.0 (L) 41.0 - 52.0 %    POCT Sodium, Arterial 132 (L) 136 - 145 mmol/L    POCT Potassium, Arterial 4.1 3.5 - 5.3 mmol/L    POCT Chloride, Arterial 100 98 - 107 mmol/L    POCT Ionized Calcium, Arterial 1.22 1.10 - 1.33 mmol/L    POCT Glucose, Arterial 129 (H) 74 - 99 mg/dL    POCT Lactate, Arterial 2.2 (H) 0.4 - 2.0 mmol/L    POCT Base Excess, Arterial -1.9 -2.0 - 3.0 mmol/L    POCT HCO3 Calculated, Arterial 21.9 (L) 22.0 - 26.0 mmol/L    POCT Hemoglobin, Arterial 10.2 (L) 13.5 - 17.5 g/dL    POCT Anion Gap, Arterial 14 10 - 25 mmo/L    Patient Temperature 37.0 degrees Celsius    FiO2 50 %   Calcium, ionized   Result Value Ref Range    POCT Calcium, Ionized 1.17 1.1 - 1.33 mmol/L   CBC   Result Value Ref Range    WBC 10.4 4.4 - 11.3 x10*3/uL    nRBC 2.9 (H) 0.0 - 0.0 /100 WBCs    RBC 3.39 (L) 4.50 - 5.90 x10*6/uL    Hemoglobin 9.8 (L) 13.5 - 17.5 g/dL    Hematocrit 27.4 (L) 41.0 - 52.0 %    MCV 81 80 - 100 fL    MCH 28.9 26.0 - 34.0 pg    MCHC 35.8 32.0 - 36.0 g/dL    RDW 19.9 (H) 11.5 - 14.5 %    Platelets 101 (L) 150 - 450 x10*3/uL   Coagulation Screen   Result Value  Ref Range    Protime 13.8 (H) 9.8 - 12.8 seconds    INR 1.2 (H) 0.9 - 1.1    aPTT 47 (H) 27 - 38 seconds   Hepatic function panel   Result Value Ref Range    Albumin 4.0 3.4 - 5.0 g/dL    Bilirubin, Total 26.7 (H) 0.0 - 1.2 mg/dL    Bilirubin, Direct 17.1 (H) 0.0 - 0.3 mg/dL    Alkaline Phosphatase 160 (H) 33 - 136 U/L    ALT 13 10 - 52 U/L    AST 89 (H) 9 - 39 U/L    Total Protein 6.0 (L) 6.4 - 8.2 g/dL   Magnesium   Result Value Ref Range    Magnesium 2.55 (H) 1.60 - 2.40 mg/dL   Basic Metabolic Panel   Result Value Ref Range    Glucose 122 (H) 74 - 99 mg/dL    Sodium 135 (L) 136 - 145 mmol/L    Potassium 4.0 3.5 - 5.3 mmol/L    Chloride 99 98 - 107 mmol/L    Bicarbonate 23 21 - 32 mmol/L    Anion Gap 17 10 - 20 mmol/L    Urea Nitrogen 27 (H) 6 - 23 mg/dL    Creatinine 0.84 0.50 - 1.30 mg/dL    eGFR >90 >60 mL/min/1.73m*2    Calcium 9.0 8.6 - 10.6 mg/dL   Phosphorus   Result Value Ref Range    Phosphorus 1.9 (L) 2.5 - 4.9 mg/dL   Blood Gas Arterial Full Panel   Result Value Ref Range    POCT pH, Arterial 7.43 (H) 7.38 - 7.42 pH    POCT pCO2, Arterial 35 (L) 38 - 42 mm Hg    POCT pO2, Arterial 103 (H) 85 - 95 mm Hg    POCT SO2, Arterial 99 94 - 100 %    POCT Oxy Hemoglobin, Arterial 96.6 94.0 - 98.0 %    POCT Hematocrit Calculated, Arterial 32.0 (L) 41.0 - 52.0 %    POCT Sodium, Arterial 132 (L) 136 - 145 mmol/L    POCT Potassium, Arterial 4.1 3.5 - 5.3 mmol/L    POCT Chloride, Arterial 101 98 - 107 mmol/L    POCT Ionized Calcium, Arterial 1.21 1.10 - 1.33 mmol/L    POCT Glucose, Arterial 123 (H) 74 - 99 mg/dL    POCT Lactate, Arterial 2.5 (H) 0.4 - 2.0 mmol/L    POCT Base Excess, Arterial -0.8 -2.0 - 3.0 mmol/L    POCT HCO3 Calculated, Arterial 23.2 22.0 - 26.0 mmol/L    POCT Hemoglobin, Arterial 10.7 (L) 13.5 - 17.5 g/dL    POCT Anion Gap, Arterial 12 10 - 25 mmo/L    Patient Temperature 37.0 degrees Celsius    FiO2 50 %   POCT GLUCOSE   Result Value Ref Range    POCT Glucose 131 (H) 74 - 99 mg/dL   Blood Gas  Arterial Full Panel   Result Value Ref Range    POCT pH, Arterial 7.43 (H) 7.38 - 7.42 pH    POCT pCO2, Arterial 33 (L) 38 - 42 mm Hg    POCT pO2, Arterial 68 (L) 85 - 95 mm Hg    POCT SO2, Arterial 95 94 - 100 %    POCT Oxy Hemoglobin, Arterial 93.2 (L) 94.0 - 98.0 %    POCT Hematocrit Calculated, Arterial 35.0 (L) 41.0 - 52.0 %    POCT Sodium, Arterial 132 (L) 136 - 145 mmol/L    POCT Potassium, Arterial 3.7 3.5 - 5.3 mmol/L    POCT Chloride, Arterial 101 98 - 107 mmol/L    POCT Ionized Calcium, Arterial 1.16 1.10 - 1.33 mmol/L    POCT Glucose, Arterial 126 (H) 74 - 99 mg/dL    POCT Lactate, Arterial 2.2 (H) 0.4 - 2.0 mmol/L    POCT Base Excess, Arterial -1.8 -2.0 - 3.0 mmol/L    POCT HCO3 Calculated, Arterial 21.9 (L) 22.0 - 26.0 mmol/L    POCT Hemoglobin, Arterial 11.6 (L) 13.5 - 17.5 g/dL    POCT Anion Gap, Arterial 13 10 - 25 mmo/L    Patient Temperature 37.0 degrees Celsius    FiO2 40 %   POCT GLUCOSE   Result Value Ref Range    POCT Glucose 149 (H) 74 - 99 mg/dL     Current Medications  Scheduled medications  esomeprazole, 40 mg, nasogastric tube, Daily  fludrocortisone, 0.1 mg, nasogastric tube, q12h  heparin (porcine), 5,000 Units, subcutaneous, q8h  hydrocortisone sodium succinate, 50 mg, intravenous, q6h  insulin glargine, 12 Units, subcutaneous, q12h  insulin lispro, 0-20 Units, subcutaneous, q4h  meropenem, 1 g, intravenous, q12h  midodrine, 20 mg, orogastric tube, q8h  oxygen, , inhalation, Continuous - Inhalation  sodium phosphate, 21 mmol, intravenous, Once  thiamine, 500 mg, nasogastric tube, q8h      Continuous medications  lactated Ringer's, 5 mL/hr, Last Rate: 5 mL/hr (04/23/24 2000)  norepinephrine, 0.01-1 mcg/kg/min (Dosing Weight), Last Rate: 0.06 mcg/kg/min (04/24/24 1200)  PrismaSol 4/2.5, 3,000 mL/hr, Last Rate: 3,000 mL/hr (04/24/24 1023)  vasopressin, 0.01-0.03 Units/min, Last Rate: 0.03 Units/min (04/24/24 1200)      PRN medications  PRN medications: acetaminophen, alteplase, alteplase,  calcium gluconate, calcium gluconate, dextrose, glucagon, heparin, heparin, HYDROmorphone, HYDROmorphone, labetaloL, magnesium sulfate, magnesium sulfate, midodrine, sodium chloride     Assessment  Jason Hollins 63 y.o. 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 Hawkins and Dr. Smith on 3/5/24. Postoperative course c/b arrest requiring CPR with ROSC after TPA for assumed large PE on 3/20. Increased swelling at flap site appreciated overnight 3/20-3/21 and pt returned to OR for exploration of R flap site, evacuation of hematoma, suture ligation of multiple bleeding vessel sites in gluteal area and wound closure with Dr. Smith on 3/21. Pt has remained in ICU for continued critical care evaluation and management postoperatively. Returned to OR 4/11 with plastic surgery for R posterior thigh/gluteal region I&D with tissue advancement/complex closure and application of incisional wound vac. Overnight on 4/18, pt with interval c/f bleeding recurrence at R thigh/gluteal region surgical site. S/P I&D of right hip with evacuation of hematoma, closure and incisional wound vac replacement with Dr. Smith on evening of 4/18.     Plan/Recommendations   -Appreciate ICU further evaluation into clinical status change on 4/21 (marginal temperature, rise in WBC count, lactic and pressor requirement with somnolence on exam)       ·  Cultured drainage from trach site-  gram positive and negative bacteria and klebsiella        ·  White count stable: 10.4 this AM       ·  Blood cultures NGTD       ·  Pt remaining afebrile   - 4/22 Vascular US notable for superficial thromboses of mid cephalic vein and soleal vein       ·  Plastics recommend careful anticoagulation due to recent history of rebleeding       ·  Heparin 5000 Q8 with close  monitoring of flap site and drain output        ·  INR 1.2 and PT 13.8 early this AM, continue to trend       ·  IVC filter placed 4/23  - Maintain incisional wound vac to R posterior thigh wound site per plastic surgery x14 (5/2) days post-op         ·  Settings: -50 mmHg low continuous suction        ·  Monitor/record vac canister output [id number]       ·  Notify plastic surgery with any concerns of leak or obstruction  - Continue ANDERSON drain care to x3 drains present at R thigh flap reconstruction site      ·  Strip drain tubing TID and PRN     ·  Monitor and record output q8h      ·  Keep drain sites C/D/I      ·  Notify plastics if ANDERSON drain output exceeds > 300 ml/hr (continuing to monitor closely)   - Avoid pressure application or positioning onto flap site or incisions at R upper leg   - Recommend patient be positioned off of R side as able to prevent flap compression/wound breakdown   - Continue clinitron fluidized air mattress  - Plastic Surgery will continue to follow      Patient and plan discussed with Dr. Smith.     PHILLIP Dalal-CNP   Plastic and Reconstructive Surgery   [place]  Pager #[phone]  Team phone: [phone]

## 2024-04-24 NOTE — PROGRESS NOTES
Occupational Therapy    Occupational Therapy Treatment    Name: Jason Hollins  MRN: [id number]  : 1960  Date: 24  Room:       Time Calculation  Start Time: 1050  Stop Time: 1116  Time Calculation (min): 26 min    Assessment:  End of Session Communication: Bedside nurse  End of Session Patient Position: Bed, 4 rail up, Alarm off, not on at start of session    Plan:  Treatment Interventions: ADL retraining, Functional transfer training, Patient/family training  OT Frequency: 4 times per week  OT Discharge Recommendations: Moderate intensity level of continued care  Equipment Recommended upon Discharge:  (none)  OT Recommended Transfer Status:  (tbd; medically unsafe to assess on OT re-eval on 3/28.)  OT - OK to Discharge: Yes (when medically appropriate)    Subjective     General:  OT Last Visit  OT Received On: 24  Co-Treatment: PT  Co-Treatment Reason: AMPAC<10, vent dependent with intent to mobilize, rehab tech also assisted with mobility  Prior to Session Communication: Bedside nurse, Physician  Patient Position Received: Bed, 4 rail up, Alarm off, not on at start of session  Family/Caregiver Present: Yes  General Comment: Pt supine in bed on arrival, agreeable. SIMV 40% FiO2, PEEP 10, PS 12, RR 20, .03 vaso, .06 levo, CVVH. Pt s/p IVC filter placement      Precautions:  LE Weight Bearing Status: Right Non-Weight Bearing  Medical Precautions: Fall precautions, Oxygen therapy device and L/min  Precautions Comment: MAP 65-90,  no other mobility restrictions following most recent OR  per plastics team, contact precautions, RUE DVT not on AC    Vitals:  Vital Signs  Heart Rate: 65 (post: 73)  Resp: (!) 32 (post: 26)  SpO2: 95 % (post: 99)  BP: 119/50 (SBP 140s at EOB; Post: 111/50)  MAP (mmHg): 69 (post: 64)  Lines/Tubes/Drains:  CVC 24 Triple lumen Left Internal jugular (Active)   Number of days: 5       Arterial Line 24 Left Radial (Active)   Number of days: 18       Surgical  "Airway Shiley Proximal 6 (Active)   Number of days: 22       Urethral Catheter 16 Fr. (Active)   Number of days: 7       Closed/Suction Drain 2 Proximal;Right;Anterior Thigh Bulb 19 Fr. (Active)   Number of days: 33       Closed/Suction Drain 1 Right;Anterior Thigh Bulb 15 Fr. (Active)   Number of days: 33       Closed/Suction Drain 3 Proximal;Right;Anterior Thigh (Active)   Number of days: 34       NG/OG/Feeding Tube Other (Comment) Right nostril (Active)   Number of days: 22       Cognition:  Overall Cognitive Status: Impaired  Orientation Level:  (with choices, reported \"house\" as place and month as April)  Following Commands:  (Appeared to follow all commands within physical abilities this date)  Cognition Comments: Attempted to mouth words and gesture to communicate. Spoke with NP regarding possible benefits of SLP consult for communication barriers.    Pain Assessment:  Pain Assessment  Pain Assessment:  (reported back pain, not quantified)     Objective      Bed Mobility/Transfers:     Bed Mobility  Bed Mobility: Yes  Bed Mobility 1  Bed Mobility Comments 1: pt tolerated full HOB elevation with stable vital signs prior to progressing to EOB  Bed Mobility 2  Bed Mobility  2: Supine to sitting, Sitting to supine  Level of Assistance 2: Dependent (x4)  Bed Mobility Comments 2: draw sheet, HOB elevated     Therapy/Activity:   Therapeutic Exercise  Therapeutic Exercise Performed: Yes  Therapeutic Exercise Activity 1: While seated EOB, pt tolerated neck AROM, shoulder rolls/shrugs, scapular protraction/retraction, and passive BLE LAQs.  Therapeutic Activity  Therapeutic Activity Performed: Yes  Therapeutic Activity 1: Pt sat EOB 7 minutes with dependent assist  Therapeutic Activity 2: While seated EOB, pt provided max cues for anterior lean, unable this date.     Outcome Measures:  [facility] Daily Activity  Putting on and taking off regular lower body clothing: Total  Bathing (including washing, rinsing, drying): " Total  Putting on and taking off regular upper body clothing: A lot  Toileting, which includes using toilet, bedpan or urinal: Total  Taking care of personal grooming such as brushing teeth: A lot  Eating Meals: Total  Daily Activity - Total Score: 8     Education Documentation  Body Mechanics, taught by Christelle Garces OT at 4/24/2024 11:52 AM.  Learner: Family, Patient  Readiness: Acceptance  Method: Explanation, Demonstration  Response: Needs Reinforcement    Precautions, taught by Christelle Garces OT at 4/24/2024 11:52 AM.  Learner: Family, Patient  Readiness: Acceptance  Method: Explanation, Demonstration  Response: Needs Reinforcement    ADL Training, taught by Christelle Garces OT at 4/24/2024 11:52 AM.  Learner: Family, Patient  Readiness: Acceptance  Method: Explanation, Demonstration  Response: Needs Reinforcement    Education Comments  No comments found.        Goals:  Encounter Problems       Encounter Problems (Active)       ADLs       Patient will perform UB and LB bathing  with stand by assist level of assistance and AE. (Not met)       Start:  03/19/24    Expected End:  04/09/24    Resolved:  03/28/24    Updated to: Patient will perform UB and LB bathing  with MAX assist level of assistance and AE.    Update reason: change in functional status         Patient with complete upper body dressing with independent level  (Not met)       Start:  03/19/24    Expected End:  04/09/24    Resolved:  03/28/24    Updated to: Patient with complete upper body dressing with MAX A    Update reason: change in functional status         Patient with complete lower body dressing with minimal assist  level of assistance donning and doffing all LE clothes  with PRN adaptive equipment  (Not met)       Start:  03/19/24    Expected End:  04/09/24    Resolved:  03/28/24    Updated to: Patient with complete lower body dressing with MAX assist  level of assistance donning and doffing all LE clothes   with PRN adaptive equipment    Update reason: change in functional status         Patient will complete daily grooming tasks  with independent level  (Not met)       Start:  03/19/24    Expected End:  04/09/24    Resolved:  03/28/24    Updated to: Patient will complete daily grooming tasks with MAX A.    Update reason: change in functional status         Patient will complete toileting including hygiene clothing management/hygiene with moderate assist level of assistance and LRD. (Not met)       Start:  03/19/24    Expected End:  04/09/24    Resolved:  03/28/24    Updated to: Patient will complete toileting including hygiene clothing management/hygiene with MAX assist level of assistance and LRD.    Update reason: change in functional status         Patient will perform UB and LB bathing  with MAX assist level of assistance and AE. (Progressing)       Start:  03/28/24    Expected End:  05/06/24                Patient with complete upper body dressing with MAX A (Progressing)       Start:  03/28/24    Expected End:  05/06/24                Patient with complete lower body dressing with MAX assist  level of assistance donning and doffing all LE clothes  with PRN adaptive equipment (Progressing)       Start:  03/28/24    Expected End:  05/06/24                Patient will complete daily grooming tasks with MAX A. (Progressing)       Start:  03/28/24    Expected End:  05/06/24                Patient will complete toileting including hygiene clothing management/hygiene with MAX assist level of assistance and LRD. (Progressing)       Start:  03/28/24    Expected End:  05/06/24                   EXERCISE/STRENGTHENING       Patient with increase BUE to WFL strength. (Not met)       Start:  03/19/24    Expected End:  04/09/24    Resolved:  03/28/24    Updated to: Patient with increase BUE to 2/5 strength.    Update reason: change in functional status         Patient with increase BUE to 2/5 strength. (Progressing)        Start:  03/28/24    Expected End:  05/06/24                   TRANSFERS       Patient will perform bed mobility moderate assist level of assistance and bed rails in order to improve safety and independence with mobility (Not met)       Start:  03/19/24    Expected End:  04/09/24    Resolved:  03/28/24    Updated to: Patient will perform bed mobility moderate assist x2 level of assistance and bed rails in order to improve safety and independence with mobility    Update reason: change in functional status         Patient will complete functional transfer with least restrictive device with moderate assist level of assistance. (Not met)       Start:  03/19/24    Expected End:  04/09/24    Resolved:  03/28/24    Updated to: Patient will complete functional transfer with least restrictive device with moderate assist x2 level of assistance.    Update reason: change in functional status         Patient will perform bed mobility moderate assist x2 level of assistance and bed rails in order to improve safety and independence with mobility (Progressing)       Start:  03/28/24    Expected End:  05/06/24                Patient will complete functional transfer with least restrictive device with moderate assist x2 level of assistance. (Progressing)       Start:  03/28/24    Expected End:  05/06/24                      Encounter Problems (Resolved)       MOBILITY       Patient will perform Functional mobility min Household distances/Community Distances with moderate assist level of assistance and least restrictive device in order to improve safety and functional mobility. (Not met)       Start:  03/19/24    Expected End:  04/09/24    Resolved:  03/28/24 04/24/24 at 11:56 AM   Christelle Garces, OT   [phone]

## 2024-04-24 NOTE — PROGRESS NOTES
Jason Hollins is a 63 y.o. male on day 50 of admission presenting with Soft tissue sarcoma (Multi).    Subjective   Overnight his lactate level was 3 however it was repeated and now it is 2.5.  He is still on 0.06 levo and 0.03 vasopressin and we are currently removing 300 cc of fluid by CVVH.    Objective     Physical Exam  Constitutional:       General: He is not in acute distress.     Appearance: He is obese. He is ill-appearing.   HENT:      Nose:      Comments: Dobhoff in R nare, bridled in place.      Mouth/Throat:      Mouth: Mucous membranes are moist.   Eyes:      General: Scleral icterus present.      Extraocular Movements: Extraocular movements intact.      Pupils: Pupils are equal, round, and reactive to light.   Neck:      Comments: LIJ trialysis in place, dressing c/d/i.   Cardiovascular:      Rate and Rhythm: Normal rate and regular rhythm.      Pulses:           Radial pulses are 1+ on the right side and 1+ on the left side.        Dorsalis pedis pulses are detected w/ Doppler on the right side and detected w/ Doppler on the left side.        Posterior tibial pulses are detected w/ Doppler on the right side and detected w/ Doppler on the left side.      Heart sounds: Normal heart sounds.   Pulmonary:      Comments: Diffuse inspiratory rales and diminished bases bilaterally. Currently on VC/AC  Abdominal:      General: Bowel sounds are normal. There is distension.      Tenderness: There is no abdominal tenderness.      Comments: Obese abdomen, firm, anasarca.   Genitourinary:     Penis: Swelling present.       Comments: Scrotal edema. Marino in place with minimal dark sharee UOP.  Musculoskeletal:      Right lower leg: 3+ Edema present.      Left lower leg: 3+ Edema present.      Comments: 3+ pitting edema noted bilaterally in upper and lower extremities. R thigh with mild swelling (improved from prior day) and with surgical incision sites clean, dry, intact. 3 ANDERSON drains and wound vac with minimal output  "this morning.   Skin:     General: Skin is warm and dry.      Coloration: Skin is jaundiced.      Comments: ANDERSON drain x2 and wound vac to R gluteal flap site.   Neurological:      Mental Status: He is alert.      Comments: Alert, nodding appropriately to yes/no questions. Following simple commands with BUE and LLE, no movement with RLE.   Psychiatric:      Comments: Calm and cooperative.       Last Recorded Vitals  Blood pressure 120/59, pulse 69, temperature 35.1 °C (95.2 °F), temperature source Temporal, resp. rate (!) 32, height 1.778 m (5' 10\"), weight 135 kg (298 lb 1 oz), SpO2 92%.  Intake/Output last 3 Shifts:  I/O last 3 completed shifts:  In: 3684.8 (27.3 mL/kg) [I.V.:618.8 (4.6 mL/kg); Other:95; NG/GT:1115; IV Piggyback:1856]  Out: 8585 (63.5 mL/kg) [Urine:10 (0 mL/kg/hr); Drains:265; Other:8310]  Weight: 135.2 kg     Relevant Results  Scheduled medications  esomeprazole, 40 mg, nasogastric tube, Daily  fludrocortisone, 0.1 mg, nasogastric tube, q12h  heparin (porcine), 5,000 Units, subcutaneous, q8h  hydrocortisone sodium succinate, 50 mg, intravenous, q6h  insulin glargine, 12 Units, subcutaneous, q12h  insulin lispro, 0-20 Units, subcutaneous, q4h  meropenem, 1 g, intravenous, q12h  midodrine, 20 mg, orogastric tube, q8h  oxygen, , inhalation, Continuous - Inhalation  sodium phosphate, 21 mmol, intravenous, Once  thiamine, 500 mg, nasogastric tube, q8h      Continuous medications  lactated Ringer's, 5 mL/hr, Last Rate: 5 mL/hr (04/23/24 2000)  norepinephrine, 0.01-1 mcg/kg/min (Dosing Weight), Last Rate: 0.06 mcg/kg/min (04/24/24 0900)  PrismaSol 4/2.5, 3,000 mL/hr, Last Rate: 3,000 mL/hr (04/24/24 0039)  vasopressin, 0.01-0.03 Units/min, Last Rate: 0.03 Units/min (04/24/24 0900)      PRN medications  PRN medications: acetaminophen, alteplase, alteplase, calcium gluconate, calcium gluconate, dextrose, glucagon, heparin, heparin, HYDROmorphone, HYDROmorphone, labetaloL, magnesium sulfate, magnesium " sulfate, midodrine, sodium chloride  Results for orders placed or performed during the hospital encounter of 03/05/24 (from the past 24 hour(s))   Vancomycin, Trough Pls don't collect too early. Collect before Vancomycin PM dose   Result Value Ref Range    Vancomycin, Trough 14.2 5.0 - 20.0 ug/mL   POCT GLUCOSE   Result Value Ref Range    POCT Glucose 168 (H) 74 - 99 mg/dL   POCT GLUCOSE   Result Value Ref Range    POCT Glucose 140 (H) 74 - 99 mg/dL   Calcium, ionized   Result Value Ref Range    POCT Calcium, Ionized 1.14 1.1 - 1.33 mmol/L   CBC   Result Value Ref Range    WBC 9.1 4.4 - 11.3 x10*3/uL    nRBC 3.5 (H) 0.0 - 0.0 /100 WBCs    RBC 3.18 (L) 4.50 - 5.90 x10*6/uL    Hemoglobin 9.3 (L) 13.5 - 17.5 g/dL    Hematocrit 26.9 (L) 41.0 - 52.0 %    MCV 85 80 - 100 fL    MCH 29.2 26.0 - 34.0 pg    MCHC 34.6 32.0 - 36.0 g/dL    RDW 21.3 (H) 11.5 - 14.5 %    Platelets 110 (L) 150 - 450 x10*3/uL   Coagulation Screen   Result Value Ref Range    Protime 13.6 (H) 9.8 - 12.8 seconds    INR 1.2 (H) 0.9 - 1.1    aPTT 47 (H) 27 - 38 seconds   Hepatic function panel   Result Value Ref Range    Albumin 3.9 3.4 - 5.0 g/dL    Bilirubin, Total 23.7 (H) 0.0 - 1.2 mg/dL    Bilirubin, Direct 15.6 (H) 0.0 - 0.3 mg/dL    Alkaline Phosphatase 156 (H) 33 - 136 U/L    ALT 11 10 - 52 U/L    AST 82 (H) 9 - 39 U/L    Total Protein 5.7 (L) 6.4 - 8.2 g/dL   Magnesium   Result Value Ref Range    Magnesium 2.54 (H) 1.60 - 2.40 mg/dL   Basic Metabolic Panel   Result Value Ref Range    Glucose 131 (H) 74 - 99 mg/dL    Sodium 135 (L) 136 - 145 mmol/L    Potassium 4.3 3.5 - 5.3 mmol/L    Chloride 100 98 - 107 mmol/L    Bicarbonate 22 21 - 32 mmol/L    Anion Gap 17 10 - 20 mmol/L    Urea Nitrogen 32 (H) 6 - 23 mg/dL    Creatinine 0.98 0.50 - 1.30 mg/dL    eGFR 87 >60 mL/min/1.73m*2    Calcium 9.1 8.6 - 10.6 mg/dL   Phosphorus   Result Value Ref Range    Phosphorus 3.0 2.5 - 4.9 mg/dL   Blood Gas Arterial Full Panel   Result Value Ref Range    POCT  pH, Arterial 7.42 7.38 - 7.42 pH    POCT pCO2, Arterial 33 (L) 38 - 42 mm Hg    POCT pO2, Arterial 78 (L) 85 - 95 mm Hg    POCT SO2, Arterial 97 94 - 100 %    POCT Oxy Hemoglobin, Arterial 94.3 94.0 - 98.0 %    POCT Hematocrit Calculated, Arterial 29.0 (L) 41.0 - 52.0 %    POCT Sodium, Arterial 132 (L) 136 - 145 mmol/L    POCT Potassium, Arterial 4.3 3.5 - 5.3 mmol/L    POCT Chloride, Arterial 101 98 - 107 mmol/L    POCT Ionized Calcium, Arterial 1.20 1.10 - 1.33 mmol/L    POCT Glucose, Arterial 123 (H) 74 - 99 mg/dL    POCT Lactate, Arterial 3.0 (H) 0.4 - 2.0 mmol/L    POCT Base Excess, Arterial -2.6 (L) -2.0 - 3.0 mmol/L    POCT HCO3 Calculated, Arterial 21.4 (L) 22.0 - 26.0 mmol/L    POCT Hemoglobin, Arterial 9.6 (L) 13.5 - 17.5 g/dL    POCT Anion Gap, Arterial 14 10 - 25 mmo/L    Patient Temperature 37.0 degrees Celsius    FiO2 50 %   POCT GLUCOSE   Result Value Ref Range    POCT Glucose 130 (H) 74 - 99 mg/dL   Blood Gas Arterial Full Panel   Result Value Ref Range    POCT pH, Arterial 7.43 (H) 7.38 - 7.42 pH    POCT pCO2, Arterial 33 (L) 38 - 42 mm Hg    POCT pO2, Arterial 85 85 - 95 mm Hg    POCT SO2, Arterial 99 94 - 100 %    POCT Oxy Hemoglobin, Arterial 95.7 94.0 - 98.0 %    POCT Hematocrit Calculated, Arterial 31.0 (L) 41.0 - 52.0 %    POCT Sodium, Arterial 132 (L) 136 - 145 mmol/L    POCT Potassium, Arterial 4.1 3.5 - 5.3 mmol/L    POCT Chloride, Arterial 100 98 - 107 mmol/L    POCT Ionized Calcium, Arterial 1.22 1.10 - 1.33 mmol/L    POCT Glucose, Arterial 129 (H) 74 - 99 mg/dL    POCT Lactate, Arterial 2.2 (H) 0.4 - 2.0 mmol/L    POCT Base Excess, Arterial -1.9 -2.0 - 3.0 mmol/L    POCT HCO3 Calculated, Arterial 21.9 (L) 22.0 - 26.0 mmol/L    POCT Hemoglobin, Arterial 10.2 (L) 13.5 - 17.5 g/dL    POCT Anion Gap, Arterial 14 10 - 25 mmo/L    Patient Temperature 37.0 degrees Celsius    FiO2 50 %   Calcium, ionized   Result Value Ref Range    POCT Calcium, Ionized 1.17 1.1 - 1.33 mmol/L   CBC   Result  Value Ref Range    WBC 10.4 4.4 - 11.3 x10*3/uL    nRBC 2.9 (H) 0.0 - 0.0 /100 WBCs    RBC 3.39 (L) 4.50 - 5.90 x10*6/uL    Hemoglobin 9.8 (L) 13.5 - 17.5 g/dL    Hematocrit 27.4 (L) 41.0 - 52.0 %    MCV 81 80 - 100 fL    MCH 28.9 26.0 - 34.0 pg    MCHC 35.8 32.0 - 36.0 g/dL    RDW 19.9 (H) 11.5 - 14.5 %    Platelets 101 (L) 150 - 450 x10*3/uL   Coagulation Screen   Result Value Ref Range    Protime 13.8 (H) 9.8 - 12.8 seconds    INR 1.2 (H) 0.9 - 1.1    aPTT 47 (H) 27 - 38 seconds   Hepatic function panel   Result Value Ref Range    Albumin 4.0 3.4 - 5.0 g/dL    Bilirubin, Total 26.7 (H) 0.0 - 1.2 mg/dL    Bilirubin, Direct 17.1 (H) 0.0 - 0.3 mg/dL    Alkaline Phosphatase 160 (H) 33 - 136 U/L    ALT 13 10 - 52 U/L    AST 89 (H) 9 - 39 U/L    Total Protein 6.0 (L) 6.4 - 8.2 g/dL   Magnesium   Result Value Ref Range    Magnesium 2.55 (H) 1.60 - 2.40 mg/dL   Basic Metabolic Panel   Result Value Ref Range    Glucose 122 (H) 74 - 99 mg/dL    Sodium 135 (L) 136 - 145 mmol/L    Potassium 4.0 3.5 - 5.3 mmol/L    Chloride 99 98 - 107 mmol/L    Bicarbonate 23 21 - 32 mmol/L    Anion Gap 17 10 - 20 mmol/L    Urea Nitrogen 27 (H) 6 - 23 mg/dL    Creatinine 0.84 0.50 - 1.30 mg/dL    eGFR >90 >60 mL/min/1.73m*2    Calcium 9.0 8.6 - 10.6 mg/dL   Phosphorus   Result Value Ref Range    Phosphorus 1.9 (L) 2.5 - 4.9 mg/dL   Blood Gas Arterial Full Panel   Result Value Ref Range    POCT pH, Arterial 7.43 (H) 7.38 - 7.42 pH    POCT pCO2, Arterial 35 (L) 38 - 42 mm Hg    POCT pO2, Arterial 103 (H) 85 - 95 mm Hg    POCT SO2, Arterial 99 94 - 100 %    POCT Oxy Hemoglobin, Arterial 96.6 94.0 - 98.0 %    POCT Hematocrit Calculated, Arterial 32.0 (L) 41.0 - 52.0 %    POCT Sodium, Arterial 132 (L) 136 - 145 mmol/L    POCT Potassium, Arterial 4.1 3.5 - 5.3 mmol/L    POCT Chloride, Arterial 101 98 - 107 mmol/L    POCT Ionized Calcium, Arterial 1.21 1.10 - 1.33 mmol/L    POCT Glucose, Arterial 123 (H) 74 - 99 mg/dL    POCT Lactate, Arterial 2.5  (H) 0.4 - 2.0 mmol/L    POCT Base Excess, Arterial -0.8 -2.0 - 3.0 mmol/L    POCT HCO3 Calculated, Arterial 23.2 22.0 - 26.0 mmol/L    POCT Hemoglobin, Arterial 10.7 (L) 13.5 - 17.5 g/dL    POCT Anion Gap, Arterial 12 10 - 25 mmo/L    Patient Temperature 37.0 degrees Celsius    FiO2 50 %       Assessment/Plan   Principal Problem:    Soft tissue sarcoma (Multi)  Active Problems:    Acute kidney injury (nontraumatic) (CMS-HCC)    Pulmonary embolus (Multi)    Anemia    Thrombocytopenia (CMS-HCC)    Current chronic use of systemic steroids    Gastroesophageal reflux disease without esophagitis    Extraskeletal myxoid chondrosarcoma (Multi)    Cardiopulmonary arrest (Multi)    Acute respiratory failure with hypoxia (Multi)    Tracheostomy hemorrhage (Multi)    Postoperative wound breakdown, initial encounter    Postoperative wound breakdown, sequela    Hematoma    Jason Hlolins is a 63 y.o. 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 Hawkins and Dr. Smith on 3/5/24.  Post operative course was uncomplicated and patient was on RNF until 3/20 for prolonged bed rest to avoid hip flexion to maintain flap integrity.  Patient was on prophylactic lovenox while on bedrest.      3/20: Cardiopulmonary arrest s/p CPR with ROSC after TPA, overnight started on heparin, epo, increased pressor requirements, increased swelling at flap site.      3/21: Hemorrhagic shock, MTP. Firm and large right flap site. Return to OR s/p Exploration of right thigh wound, Evacuation of hematoma. Suture ligation of multiple bleeding vessel and several points in gluteal area.      4/1: s/p Trach     4/4: return OR with ENT s/p laryngoscopy, esophagoscopy and bronchoscopy, trach revision. Found extensive clot burden in esophagus and stomach as well as in the  lungs. Oozing at thyroid bed cauterized. Trach exchanged to new 6.0 prox XLT elvie. OR findings: blood up glottis and from thyroid bed to airway.     4/9: Patient is medically stable for OR on 4/11/24.  He is appropriately n.p.o. since midnight and has had more DVT prophylaxis held for OR today.     4/18: Patient bleeding into RLE given increased ANDERSON drain output and non-incrementing Hgb despite transfusion. Return to OR for washout.     4/21: - patient more somnolent, increasing WBC, Temp 99.7, Lactate 2.9 this afternoon, and increasing pressor requirements: sending blood culture x 2, resumed vancomycin, reengaged ID, and returned hydrocortisone dosing to q6hr, giving unit PRBC    Plan:  NEURO: History of myxoid chondrosarcoma s/p wide resection sarcoma, sciatic nerve neurolysis of right lower extremity with right gluteal reconstruction, pedicle ALT, vastus lateralus flap, pedicle gluteal and perisformis flap with Dr. Hawkins and Dr. Smith on 3/5/24 s/p cardiopulmonary arrest with ROSC 3/20. CT head 3/22 without acute process. Weaned off cisatracurium and propofol overnight 3/23-3/24. Ketamine weaned off 3/25 and Fentanyl weaned off 3/26 am. Repeat CT Head 3/26 without acute process. MRI Brain 3/30, negative for cerebral infarction or hemorrhage. Neuro exam - following commands except for RLE, tracking, nodding/shaking head to questions.  CT head on 4/22 that was unremarkable.  - ongoing neuro and pain assessments  -Continue thiamine 500 mg TID   - PRN dilaudid for pain control   - PT/OT -> AROM     CV: History of HTN, HLD. Acute cardiopulmonary arrest 2/2 to possible massive PE vs massive STEMI, received multiple rounds of CPR and one defibrillation.  Sustained ROSC achieved after TPA bolus. On high dose levophed, epinephrine, vaso, angioT2. Plan on 3/21 was for ECMO which was aborted secondary to large hemhorrge at right flap site. Had MTP and taken OR with 5L evacuated. ECHO 3/21: Normal LV, Mod enlarged RV,  slight suggestion of a Townsend's sign / RV strain, low normal RV function. ECHO 3/22 with hyperdynamic LV. New onset Afib with RVR overnight 3/22-3/23, dosed with 150mg amio x2, started infusion at 1mg/min thereafter. AT2 weaned off. Amio infusion discontinued 3/25. Lactate downtrending. Vaso and epi weaned off. Remains in NSR. iEpo weaned off 3/27. Repeat TTE 3/29 and 4/2 essentially unchanged. Levo resumed 4/2 evening to continue aggressive fluid removal. Repeat TTE on 4/10 with LVEF 65-70% and RV difficulty to assess. Weaned off levo 4/13. Marginal hypotension 4/20 . Currently on  norepi 0.06 and 0.03 of vasopressin.  His BP has been stable and we are currently removing 300 cc of fluid  - continuous EKG/abp monitoring  - Goal map range 65-90  -If he required more of vasopressor today, will plan to give him 250 cc of albumin and decreasing the rate of the fluid removal by the CVVH  - continue levo and vasopressin infusion to support BP while removing fluid with CVVH, wean off as tolerated  - continue midodrine 20mg q8h   - Continue florinef 0.1mg BID   - continue hydrocortisone 50 mg q6hr       PULM: Arrived to SICU intubated julio c-CPR. Concern for massive PE. Started on iEpo, currently on 0.05. Hypoxic respiratory failure. Severe ARDS. ETT exchanged 3/23. CT Chest 3/26 with interval JOHN consolidative/ground glass opacity. S/p Tracheostomy on 4/1. S/p trach revision on 4/4. CXR with bilateraly pulmonary edema and pleural effusions R>L.  Currently on SIMV and 40% of FiO2.  - wean vent as tolerated, maintain SpO2 >90%, PaO2 >60  -CPAP trial today  - additional bronchial hygiene as indicated  - Daily CXR  - ABG prn     ENT: Had epistaxis 3/23, completed afrin bid x3 days. S/p trach on 4/1. Bleeding from trach site 4/2 am, packing placed by ENT. Repeat episode of bloody tracheal secretions 4/3 through this am. Taken back to OR with ENT 4/4 s/p trach revision. Noted to have greenish discharge around his tracheostomy  on 4/22 and ENT saw the patient.   - Culture ordered for drainage around of the trach>> grew gram positive and negative bacteria and Klebsiella  - ENT following, will will get in touch with them about a possible collection or abscess given his culture grew Klebsiella     GI: NPO. Shock liver, pancreatitis. Elevated TG. Elevated lactate. Consulted ACS for potential bowel ischemia as cause of persistent lactic acidosis. CT imaging 3/22 with evidence of pancreatitis. No acute surgical indication per ACS. RUQ US 3/22 with thickening of GB wall without cholelithiasis. CT A/P 3/26 negative for acute bleed. LFTs downtrending. Worsening hyperbilirubinemia. Amylase and lipase now WNL. Repeat RUQ US 4/1 with nonspecific gallbladder wall edema and thickening which may be 2/2 generalized fluid overload, mild hepatomegaly with slight nodular contour and c/f steatosis and fibrosis, irregular hypoechoic region adjacent to hepatic veins. US liver with doppler 4/2 revealed hepatomegaly with nodular contour, mildly elevated RI in main and left hepatic arteries. Hyperbilirubinemia.KUB xray didn't show any signs of bowel obstructions or perforations.  Had 6 BM in the past 24hrs (was reddish, brownish and mucusy in nature).   - Resume TFs to goal 45ml/hr, prostat daily (currently on 20cc, will increase 10cc qhr till goal)   - c-diff PCR   - PPI daily for GI prophylaxis  - Trend LFTs daily  - stop bowel regimen given his current diarrhea        : No history of renal disease. Baseline creatinine 0.6. Anuric RUTH. Elevated CK, downtrending. Metabolic acidosis, total body fluid overload, hyperkalemia. Started on CVVH 3/21, stopped bicarb infusion 3/22. Marino removed 3/24. Hyponatremia resolved. Stopped CVVH 4/2.  Has been tolerating iHD. Net +ve 1467. CVVH restarted on 4/18 given hemorrhage and takeback to OR. Now on CVVH and pulling out 300cc of fluid/hr. Net negative 2L for past 24hrs.  - Nephrology following, appreciate recs  - continue  CVVH and goal for at least 100 ml/hr if MAP is above 65 and if didn't required increasing in his vasopressor dosage.   - RFP BID and PRN  - Replete electrolytes judiciously  - Replete electrolyte per SICU protocol      HEME: Patient was on ppx lovenox for DVT prophylaxis prior to cardiopulmonary arrest. Concern for massive PE patient received bolus of TPA during CPR. Started on heparin infusion overnight given concern for PE. Hemorrhagic shock 3/21, MTP and back to OR s/p Exploration of right thigh woundEvacuation of hematoma. Suture ligation of multiple bleeding vessel and several points in gluteal area. Hematology consulted 3/22 to r/o HLH. Transfused 1 RBC overnight 3/22-3/23. Thrombocytopenia, coagulapathy, hypofibrinogenemia. LE Duplex 3/25 +acute occlusive R soleal DVT. Received 2u PRBC and 1u FFP on 3/26. Heparin infusion discontinued 3/26 to r/o HIT and transitioned to bival. PF4 negative, resumed heparin infuision 3/27. Elevated IL2R and decreased NK function. C/f HLH (low suspicion), empirically treated with decadron (3/28-3/31). Thrombocytopenia on 3/30 to 18k, received 5pk, did not increment. Heparin held. Thrombocytopenia likely 2/2 to consumptive process, hematology following. Received IVIG and 5pk plts 3/31. Pancytopenia persists, improving thrombocytopenia. On 4/18 increased bleeding noted into RLE. Blood transfusion ongoing and Hep gtt held. On 4/22  DVT study of UE and LE that showed acute occlusive deep vein thrombosis visualized in the right soleal vein and acute occlusive superficial venous thrombosis visualized in the mid right cephalic vein. Today's INR is 1.2  - CBC and coags BID and PRN  - Sub q heparin 5000 units q8hr, plastic is ok with that  -IVC filter placed on 4/23    - No full therapeutic anticoagulation at this time  - Notify Plastics if ANDERSON drain output exceeds >300 ml/hr  - close surveillance of RLE and drains  - maintain active T&S        ENDO: History of DM2. A1c 7.5%. TSH WNL  1/2024. Previously hyperglycemic, but recently hypoglycemic requiring D50. Glucose trended up since tube feed resumption. Now his feeding tube is resumed and will monitor his blood glucose closely.   - continue Lantus 12U BID  and on SSI to #4   - q4h accuchecks    ID: On broad antimicrobial therapy. MRSA screen + 2/28/24. Pan cultures sent 3/22. Sputum NTF, +MRSA screen (completed 5 day course mupirocin), UCx and BCx negative. Completed 7 day course vanc/zosyn 3/27. Febrile to 39.1 4/3, resent blood cultures and BAL and started vanco and zosyn. Switched zosyn to reynaldo, kept on vanco, added john. Blood cultures and sputum with Klebsiella. Repeat blood cultures sent 4/4 (neg final). Wound culture 4/11 +MDRO Klebsiella aerogenes (S meropenem). Urine culture 4/12 negative. MRSA negative 4/18. Started on Vancomycin and Micafungin on 4/21. Blood cultures NGTD and also Culture from his trach drainage grew gram positive and negative bacteria and klebsiella   - ID following, appreciate recs  - temp q4h, wbc daily  - continue meropenem (stop date 5/2/24 per ID)  - ENT culture from his trach, will will get in touch with them about a possible collection or abscess given his culture grew Klebsiella  - Vancomycin  stopped 4/21-4/23 per ID recs  - stop Micafungin today (started on 4/21 and stopped on 4/24)  - ongoing monitoring for s/s infection         Lines:  - LIJ trialysis (4/18, SICU)  - left radial arterial line (4/5, SICU)  - PIV x2       Dispo: Continue ICU care. Patient seen and discussed with ICU attending Dr. Morrell.      Joyce Sy MD   SICU phone [phone]

## 2024-04-24 NOTE — PROGRESS NOTES
"ENT DAILY PROGRESS NOTE  Name: Jason Hollins  MRN: [id number]  : 1960    Identification Statement  The patient is a 63 y.o. male with past medical history significant for but not limited to DM2, HLD, myxoid chondrosarcoma s/p wide resection sarcoma, sciatic nerve neurolysis of RLE with right gluteal reconstruction, pedicle ALT, vas lateralus flap, pedicle gluteal and perisformis flap on 3/5/24 with plastic surgery. Patient's hospital course complicated by cardiopulmonary arrest s/p CPR with ROSC after TPA, hemorrhagic shock. Patient initially underwent tracheostomy and bronchoscopy with Dr. Perkins on 24 for acute respiratory failure with hypoxia; he experienced post-operative bleeding from trach site and returned to OR with ENT (Dr. Wolfe) on 24 for DL, bronchoscopy, esophagoscopy and exploration of neck/trach site with source of bleeding appearing to be trickling from thyroid bed down into the airway and up to the glottis. Original trach was exchanged and a 6-0 shiley proximal XLT was placed. ENT evaluated patient on 24 due to concern for bleeding from within trach at which time patient underwent tracheobronchoscopy without evidence of any dried blood or clots. Patient evaluated by ENT on 24 for thick green discharge surrounding the stoma at which time tracheobronchoscopy was performed without evidence of mucus and bedside trach wound cultures obtained on .     Subjective  Wound cultures from trach site performed on 24 remain preliminary with \"few molymorphonuclear leukocytes, moderate mixed gram positive bacteria, abundant gram negative bacilli\". Patient remains critically ill in the ICU. He underwent IVC filter placement on 24. He remains on ventilator with FiO2 40% and PEEP 10. Primary team asked to evaluate patient due to concern for abscess formation, ICU reports patient has remained afebrile and WBC 11.2.     Objective  Temp:  [35 °C (95 °F)-35.4 °C (95.7 °F)] 35 °C (95 " °F)  Heart Rate:  [59-88] 72  Resp:  [0-32] 0  BP: (119-120)/(50-59) 119/50  Arterial Line BP 1: ()/(43-64) 118/53  FiO2 (%):  [40 %-50 %] 40 %  Gen: critically ill patient on ventilator in ICU, obese  Resp: XyC313%, PEEP 10 on ventilator  Skin: bilateral upper and lower extremity edema   Head: Atraumatic, normocephalic  Ears: Normal external ears   Nose: External nose midline, DHT present  Neck: 6-0 proximal XLT shiley trach in place with balloon inflated, mild amount of green discharge surrounding tracheostomy site decreased from 4/22/24. No bleeding or oozing around trach. No visible skin breakdown around trach. No palpable abscess around trach/anterior neck. Trach ties appropriately tight.           Intake/Output Summary (Last 24 hours) at 4/24/2024 1403  Last data filed at 4/24/2024 1200  Gross per 24 hour   Intake 2121.1 ml   Output 4340 ml   Net -2218.9 ml       Labs  Results for orders placed or performed during the hospital encounter of 03/05/24 (from the past 24 hour(s))   POCT GLUCOSE   Result Value Ref Range    POCT Glucose 140 (H) 74 - 99 mg/dL   Calcium, ionized   Result Value Ref Range    POCT Calcium, Ionized 1.14 1.1 - 1.33 mmol/L   CBC   Result Value Ref Range    WBC 9.1 4.4 - 11.3 x10*3/uL    nRBC 3.5 (H) 0.0 - 0.0 /100 WBCs    RBC 3.18 (L) 4.50 - 5.90 x10*6/uL    Hemoglobin 9.3 (L) 13.5 - 17.5 g/dL    Hematocrit 26.9 (L) 41.0 - 52.0 %    MCV 85 80 - 100 fL    MCH 29.2 26.0 - 34.0 pg    MCHC 34.6 32.0 - 36.0 g/dL    RDW 21.3 (H) 11.5 - 14.5 %    Platelets 110 (L) 150 - 450 x10*3/uL   Coagulation Screen   Result Value Ref Range    Protime 13.6 (H) 9.8 - 12.8 seconds    INR 1.2 (H) 0.9 - 1.1    aPTT 47 (H) 27 - 38 seconds   Hepatic function panel   Result Value Ref Range    Albumin 3.9 3.4 - 5.0 g/dL    Bilirubin, Total 23.7 (H) 0.0 - 1.2 mg/dL    Bilirubin, Direct 15.6 (H) 0.0 - 0.3 mg/dL    Alkaline Phosphatase 156 (H) 33 - 136 U/L    ALT 11 10 - 52 U/L    AST 82 (H) 9 - 39 U/L    Total  Protein 5.7 (L) 6.4 - 8.2 g/dL   Magnesium   Result Value Ref Range    Magnesium 2.54 (H) 1.60 - 2.40 mg/dL   Basic Metabolic Panel   Result Value Ref Range    Glucose 131 (H) 74 - 99 mg/dL    Sodium 135 (L) 136 - 145 mmol/L    Potassium 4.3 3.5 - 5.3 mmol/L    Chloride 100 98 - 107 mmol/L    Bicarbonate 22 21 - 32 mmol/L    Anion Gap 17 10 - 20 mmol/L    Urea Nitrogen 32 (H) 6 - 23 mg/dL    Creatinine 0.98 0.50 - 1.30 mg/dL    eGFR 87 >60 mL/min/1.73m*2    Calcium 9.1 8.6 - 10.6 mg/dL   Phosphorus   Result Value Ref Range    Phosphorus 3.0 2.5 - 4.9 mg/dL   Blood Gas Arterial Full Panel   Result Value Ref Range    POCT pH, Arterial 7.42 7.38 - 7.42 pH    POCT pCO2, Arterial 33 (L) 38 - 42 mm Hg    POCT pO2, Arterial 78 (L) 85 - 95 mm Hg    POCT SO2, Arterial 97 94 - 100 %    POCT Oxy Hemoglobin, Arterial 94.3 94.0 - 98.0 %    POCT Hematocrit Calculated, Arterial 29.0 (L) 41.0 - 52.0 %    POCT Sodium, Arterial 132 (L) 136 - 145 mmol/L    POCT Potassium, Arterial 4.3 3.5 - 5.3 mmol/L    POCT Chloride, Arterial 101 98 - 107 mmol/L    POCT Ionized Calcium, Arterial 1.20 1.10 - 1.33 mmol/L    POCT Glucose, Arterial 123 (H) 74 - 99 mg/dL    POCT Lactate, Arterial 3.0 (H) 0.4 - 2.0 mmol/L    POCT Base Excess, Arterial -2.6 (L) -2.0 - 3.0 mmol/L    POCT HCO3 Calculated, Arterial 21.4 (L) 22.0 - 26.0 mmol/L    POCT Hemoglobin, Arterial 9.6 (L) 13.5 - 17.5 g/dL    POCT Anion Gap, Arterial 14 10 - 25 mmo/L    Patient Temperature 37.0 degrees Celsius    FiO2 50 %   POCT GLUCOSE   Result Value Ref Range    POCT Glucose 130 (H) 74 - 99 mg/dL   Blood Gas Arterial Full Panel   Result Value Ref Range    POCT pH, Arterial 7.43 (H) 7.38 - 7.42 pH    POCT pCO2, Arterial 33 (L) 38 - 42 mm Hg    POCT pO2, Arterial 85 85 - 95 mm Hg    POCT SO2, Arterial 99 94 - 100 %    POCT Oxy Hemoglobin, Arterial 95.7 94.0 - 98.0 %    POCT Hematocrit Calculated, Arterial 31.0 (L) 41.0 - 52.0 %    POCT Sodium, Arterial 132 (L) 136 - 145 mmol/L    POCT  Potassium, Arterial 4.1 3.5 - 5.3 mmol/L    POCT Chloride, Arterial 100 98 - 107 mmol/L    POCT Ionized Calcium, Arterial 1.22 1.10 - 1.33 mmol/L    POCT Glucose, Arterial 129 (H) 74 - 99 mg/dL    POCT Lactate, Arterial 2.2 (H) 0.4 - 2.0 mmol/L    POCT Base Excess, Arterial -1.9 -2.0 - 3.0 mmol/L    POCT HCO3 Calculated, Arterial 21.9 (L) 22.0 - 26.0 mmol/L    POCT Hemoglobin, Arterial 10.2 (L) 13.5 - 17.5 g/dL    POCT Anion Gap, Arterial 14 10 - 25 mmo/L    Patient Temperature 37.0 degrees Celsius    FiO2 50 %   Calcium, ionized   Result Value Ref Range    POCT Calcium, Ionized 1.17 1.1 - 1.33 mmol/L   CBC   Result Value Ref Range    WBC 10.4 4.4 - 11.3 x10*3/uL    nRBC 2.9 (H) 0.0 - 0.0 /100 WBCs    RBC 3.39 (L) 4.50 - 5.90 x10*6/uL    Hemoglobin 9.8 (L) 13.5 - 17.5 g/dL    Hematocrit 27.4 (L) 41.0 - 52.0 %    MCV 81 80 - 100 fL    MCH 28.9 26.0 - 34.0 pg    MCHC 35.8 32.0 - 36.0 g/dL    RDW 19.9 (H) 11.5 - 14.5 %    Platelets 101 (L) 150 - 450 x10*3/uL   Coagulation Screen   Result Value Ref Range    Protime 13.8 (H) 9.8 - 12.8 seconds    INR 1.2 (H) 0.9 - 1.1    aPTT 47 (H) 27 - 38 seconds   Hepatic function panel   Result Value Ref Range    Albumin 4.0 3.4 - 5.0 g/dL    Bilirubin, Total 26.7 (H) 0.0 - 1.2 mg/dL    Bilirubin, Direct 17.1 (H) 0.0 - 0.3 mg/dL    Alkaline Phosphatase 160 (H) 33 - 136 U/L    ALT 13 10 - 52 U/L    AST 89 (H) 9 - 39 U/L    Total Protein 6.0 (L) 6.4 - 8.2 g/dL   Magnesium   Result Value Ref Range    Magnesium 2.55 (H) 1.60 - 2.40 mg/dL   Basic Metabolic Panel   Result Value Ref Range    Glucose 122 (H) 74 - 99 mg/dL    Sodium 135 (L) 136 - 145 mmol/L    Potassium 4.0 3.5 - 5.3 mmol/L    Chloride 99 98 - 107 mmol/L    Bicarbonate 23 21 - 32 mmol/L    Anion Gap 17 10 - 20 mmol/L    Urea Nitrogen 27 (H) 6 - 23 mg/dL    Creatinine 0.84 0.50 - 1.30 mg/dL    eGFR >90 >60 mL/min/1.73m*2    Calcium 9.0 8.6 - 10.6 mg/dL   Phosphorus   Result Value Ref Range    Phosphorus 1.9 (L) 2.5 - 4.9 mg/dL    Blood Gas Arterial Full Panel   Result Value Ref Range    POCT pH, Arterial 7.43 (H) 7.38 - 7.42 pH    POCT pCO2, Arterial 35 (L) 38 - 42 mm Hg    POCT pO2, Arterial 103 (H) 85 - 95 mm Hg    POCT SO2, Arterial 99 94 - 100 %    POCT Oxy Hemoglobin, Arterial 96.6 94.0 - 98.0 %    POCT Hematocrit Calculated, Arterial 32.0 (L) 41.0 - 52.0 %    POCT Sodium, Arterial 132 (L) 136 - 145 mmol/L    POCT Potassium, Arterial 4.1 3.5 - 5.3 mmol/L    POCT Chloride, Arterial 101 98 - 107 mmol/L    POCT Ionized Calcium, Arterial 1.21 1.10 - 1.33 mmol/L    POCT Glucose, Arterial 123 (H) 74 - 99 mg/dL    POCT Lactate, Arterial 2.5 (H) 0.4 - 2.0 mmol/L    POCT Base Excess, Arterial -0.8 -2.0 - 3.0 mmol/L    POCT HCO3 Calculated, Arterial 23.2 22.0 - 26.0 mmol/L    POCT Hemoglobin, Arterial 10.7 (L) 13.5 - 17.5 g/dL    POCT Anion Gap, Arterial 12 10 - 25 mmo/L    Patient Temperature 37.0 degrees Celsius    FiO2 50 %   POCT GLUCOSE   Result Value Ref Range    POCT Glucose 131 (H) 74 - 99 mg/dL   Blood Gas Arterial Full Panel   Result Value Ref Range    POCT pH, Arterial 7.43 (H) 7.38 - 7.42 pH    POCT pCO2, Arterial 33 (L) 38 - 42 mm Hg    POCT pO2, Arterial 68 (L) 85 - 95 mm Hg    POCT SO2, Arterial 95 94 - 100 %    POCT Oxy Hemoglobin, Arterial 93.2 (L) 94.0 - 98.0 %    POCT Hematocrit Calculated, Arterial 35.0 (L) 41.0 - 52.0 %    POCT Sodium, Arterial 132 (L) 136 - 145 mmol/L    POCT Potassium, Arterial 3.7 3.5 - 5.3 mmol/L    POCT Chloride, Arterial 101 98 - 107 mmol/L    POCT Ionized Calcium, Arterial 1.16 1.10 - 1.33 mmol/L    POCT Glucose, Arterial 126 (H) 74 - 99 mg/dL    POCT Lactate, Arterial 2.2 (H) 0.4 - 2.0 mmol/L    POCT Base Excess, Arterial -1.8 -2.0 - 3.0 mmol/L    POCT HCO3 Calculated, Arterial 21.9 (L) 22.0 - 26.0 mmol/L    POCT Hemoglobin, Arterial 11.6 (L) 13.5 - 17.5 g/dL    POCT Anion Gap, Arterial 13 10 - 25 mmo/L    Patient Temperature 37.0 degrees Celsius    FiO2 40 %   POCT GLUCOSE   Result Value Ref Range  "   POCT Glucose 149 (H) 74 - 99 mg/dL   CBC   Result Value Ref Range    WBC 11.2 4.4 - 11.3 x10*3/uL    nRBC 4.7 (H) 0.0 - 0.0 /100 WBCs    RBC 3.38 (L) 4.50 - 5.90 x10*6/uL    Hemoglobin 9.9 (L) 13.5 - 17.5 g/dL    Hematocrit 27.3 (L) 41.0 - 52.0 %    MCV 81 80 - 100 fL    MCH 29.3 26.0 - 34.0 pg    MCHC 36.3 (H) 32.0 - 36.0 g/dL    RDW 21.0 (H) 11.5 - 14.5 %    Platelets 93 (L) 150 - 450 x10*3/uL   Magnesium   Result Value Ref Range    Magnesium 2.72 (H) 1.60 - 2.40 mg/dL   Renal Function Panel   Result Value Ref Range    Glucose 136 (H) 74 - 99 mg/dL    Sodium 137 136 - 145 mmol/L    Potassium 4.3 3.5 - 5.3 mmol/L    Chloride 100 98 - 107 mmol/L    Bicarbonate 25 21 - 32 mmol/L    Anion Gap 16 10 - 20 mmol/L    Urea Nitrogen 25 (H) 6 - 23 mg/dL    Creatinine 0.84 0.50 - 1.30 mg/dL    eGFR >90 >60 mL/min/1.73m*2    Calcium 9.2 8.6 - 10.6 mg/dL    Phosphorus 2.8 2.5 - 4.9 mg/dL    Albumin 4.0 3.4 - 5.0 g/dL       Assessment  Patient is a 63 year old male who initially underwent tracheostomy and bronchoscopy with Dr. Perkins on 4/1/24 for acute respiratory failure with hypoxia; he experienced post-operative bleeding from trach site and returned to OR with ENT (Dr. Wolfe) on 4/4/24 for DL, bronchoscopy, esophagoscopy and exploration of neck/trach site. Primary team reached out to ENT today due to concern for abscess formation and in regards to wound cultures. Preliminary wound culture from 4/22 trach culture showing \"few molymorphonuclear leukocytes, moderate mixed gram positive bacteria, abundant gram negative bacilli.\" On examination, patient has no palpable abscess surrounding the trach or on anterior neck. Green secretions surrounding trach decreased from prior evaluation on 4/22. WBC 11.2.       Plan:  -patient seen with and case and recommendations discussed with staff attending, Dr. Arvizu  -wound recommendations per wound care team; wound care notes reviewed from 4/23 noting mepilex lite applied under trach " "place; primary team to follow-up on continued wound care recommendations  -nursing to continue standard trach care  -preliminary wound culture from 4/22 trach culture showing \"few molymorphonuclear leukocytes, moderate mixed gram positive bacteria, abundant gram negative bacilli\"; patient currently on meropenem; recommend primary team to follow-up final cultures and tailor antibiotics as cx/sensitivity results  -if primary team with clinical suspicion for abscess obtain CT neck  -recommendations discussed with primary team  -ENT will continue to see patient weekly while inpatient  -if further questions or change in patient clinical status, please page ENT    Katelyn Warren PA-C  Otolaryngology- Head & Neck Surgery    ENT Consult pager: [phone]  Please page if urgent      "

## 2024-04-24 NOTE — PROGRESS NOTES
Speech-Language Pathology    SLP Adult Inpatient Speech-Language Cognition    Patient Name: Jason Hollins  MRN: [id number]  Today's Date: 4/24/2024    [id number]       SLP Assessment:   Brief cognitive-communication evaluation conducted. Pt presents with suspected communication impairment in the setting of encephalopathy which greatly limited extent of evaluation (nodding head briefly, following 1-step commands but unable to sustain attention. SLP will follow up for ongoing assessment as mentation allows.      SLP Plan:  Plan  SLP Frequency: 3x per week  Duration: 30 days      Subjective   Pt asleep in bed upon arrival. Nephew at bedside. Unable to determine orientation due to lethargy.    Most Recent Visit:  SLP Most Recent Visit  SLP Received On: 04/24/24      General Visit Information:  General Information  Caregiver Feedback: Nephew at bedside. Mentioned this was the most exhausted he has seen pt.  Referred By: Alesha Mckeon PA-C  Patient Seen During This Visit: Yes      Objective       Cognition:  Cognition  Overall Cognitive Status: Impaired    Pt followed basic one-step directions as physically able (e.g., open mouth, smile, stick out tongue, blink). However, pt attention significantly decreased due to lethargy. Attempted communication board, but pt demonstrating insufficient extremity strength at this time.      Tracheostomy:     Shiley-6 XLT           Ventilator:     SIMV           Outpatient Education:  Adult Outpatient Education  Individual(s) Educated: Caregiver (Nephew)  Verbal Education : yes      Inpatient:  Education Documentation  No documentation found.  Education Comments  No comments found.

## 2024-04-24 NOTE — PROCEDURES
[facility] note    Seen and examined on [facility]. Labs and events reviewed   Vascular access: [facility] non-TDC    BFR :200 ml/min  Filter: M150  Replacement solution: Prismasol 4K/2.5Ca  Replacement Rate: 3000 ml/hour  1000/pump/1000/filter/1000    Fluid removal: per ICU team   No change in therapy for next 24 h.       Intake/Output Summary (Last 24 hours) at 4/24/2024 1607  Last data filed at 4/24/2024 1600  Gross per 24 hour   Intake 1958.91 ml   Output 5087 ml   Net -3128.09 ml     Heart Rate:  [59-88]   Temp:  [34.9 °C (94.8 °F)-35.2 °C (95.4 °F)]   Resp:  [0-32]   BP: (119)/(50)   SpO2:  [89 %-100 %]     Scheduled medications  esomeprazole, 40 mg, nasogastric tube, Daily  fludrocortisone, 0.1 mg, nasogastric tube, q12h  heparin (porcine), 5,000 Units, subcutaneous, q8h  hydrocortisone sodium succinate, 50 mg, intravenous, q6h  insulin glargine, 12 Units, subcutaneous, q12h  insulin lispro, 0-20 Units, subcutaneous, q4h  meropenem, 1 g, intravenous, q12h  midodrine, 20 mg, orogastric tube, q8h  oxygen, , inhalation, Continuous - Inhalation  thiamine, 500 mg, nasogastric tube, q8h      Continuous medications  lactated Ringer's, 5 mL/hr, Last Rate: 5 mL/hr (04/24/24 1600)  norepinephrine, 0.01-1 mcg/kg/min (Dosing Weight), Last Rate: 0.05 mcg/kg/min (04/24/24 1600)  PrismaSol 4/2.5, 3,000 mL/hr, Last Rate: 3,000 mL/hr (04/24/24 1544)  vasopressin, 0.01-0.03 Units/min, Last Rate: 0.03 Units/min (04/24/24 1600)      Recent Results (from the past 24 hour(s))   Calcium, ionized    Collection Time: 04/23/24  7:42 PM   Result Value Ref Range    POCT Calcium, Ionized 1.14 1.1 - 1.33 mmol/L   CBC    Collection Time: 04/23/24  7:42 PM   Result Value Ref Range    WBC 9.1 4.4 - 11.3 x10*3/uL    nRBC 3.5 (H) 0.0 - 0.0 /100 WBCs    RBC 3.18 (L) 4.50 - 5.90 x10*6/uL    Hemoglobin 9.3 (L) 13.5 - 17.5 g/dL    Hematocrit 26.9 (L) 41.0 - 52.0 %    MCV 85 80 - 100 fL    MCH 29.2 26.0 - 34.0 pg    MCHC 34.6 32.0 - 36.0 g/dL    RDW 21.3 (H) 11.5 -  14.5 %    Platelets 110 (L) 150 - 450 x10*3/uL   Coagulation Screen    Collection Time: 04/23/24  7:42 PM   Result Value Ref Range    Protime 13.6 (H) 9.8 - 12.8 seconds    INR 1.2 (H) 0.9 - 1.1    aPTT 47 (H) 27 - 38 seconds   Hepatic function panel    Collection Time: 04/23/24  7:42 PM   Result Value Ref Range    Albumin 3.9 3.4 - 5.0 g/dL    Bilirubin, Total 23.7 (H) 0.0 - 1.2 mg/dL    Bilirubin, Direct 15.6 (H) 0.0 - 0.3 mg/dL    Alkaline Phosphatase 156 (H) 33 - 136 U/L    ALT 11 10 - 52 U/L    AST 82 (H) 9 - 39 U/L    Total Protein 5.7 (L) 6.4 - 8.2 g/dL   Magnesium    Collection Time: 04/23/24  7:42 PM   Result Value Ref Range    Magnesium 2.54 (H) 1.60 - 2.40 mg/dL   Basic Metabolic Panel    Collection Time: 04/23/24  7:42 PM   Result Value Ref Range    Glucose 131 (H) 74 - 99 mg/dL    Sodium 135 (L) 136 - 145 mmol/L    Potassium 4.3 3.5 - 5.3 mmol/L    Chloride 100 98 - 107 mmol/L    Bicarbonate 22 21 - 32 mmol/L    Anion Gap 17 10 - 20 mmol/L    Urea Nitrogen 32 (H) 6 - 23 mg/dL    Creatinine 0.98 0.50 - 1.30 mg/dL    eGFR 87 >60 mL/min/1.73m*2    Calcium 9.1 8.6 - 10.6 mg/dL   Phosphorus    Collection Time: 04/23/24  7:42 PM   Result Value Ref Range    Phosphorus 3.0 2.5 - 4.9 mg/dL   Blood Gas Arterial Full Panel    Collection Time: 04/23/24  7:44 PM   Result Value Ref Range    POCT pH, Arterial 7.42 7.38 - 7.42 pH    POCT pCO2, Arterial 33 (L) 38 - 42 mm Hg    POCT pO2, Arterial 78 (L) 85 - 95 mm Hg    POCT SO2, Arterial 97 94 - 100 %    POCT Oxy Hemoglobin, Arterial 94.3 94.0 - 98.0 %    POCT Hematocrit Calculated, Arterial 29.0 (L) 41.0 - 52.0 %    POCT Sodium, Arterial 132 (L) 136 - 145 mmol/L    POCT Potassium, Arterial 4.3 3.5 - 5.3 mmol/L    POCT Chloride, Arterial 101 98 - 107 mmol/L    POCT Ionized Calcium, Arterial 1.20 1.10 - 1.33 mmol/L    POCT Glucose, Arterial 123 (H) 74 - 99 mg/dL    POCT Lactate, Arterial 3.0 (H) 0.4 - 2.0 mmol/L    POCT Base Excess, Arterial -2.6 (L) -2.0 - 3.0 mmol/L     POCT HCO3 Calculated, Arterial 21.4 (L) 22.0 - 26.0 mmol/L    POCT Hemoglobin, Arterial 9.6 (L) 13.5 - 17.5 g/dL    POCT Anion Gap, Arterial 14 10 - 25 mmo/L    Patient Temperature 37.0 degrees Celsius    FiO2 50 %   POCT GLUCOSE    Collection Time: 04/24/24 12:18 AM   Result Value Ref Range    POCT Glucose 130 (H) 74 - 99 mg/dL   Blood Gas Arterial Full Panel    Collection Time: 04/24/24 12:47 AM   Result Value Ref Range    POCT pH, Arterial 7.43 (H) 7.38 - 7.42 pH    POCT pCO2, Arterial 33 (L) 38 - 42 mm Hg    POCT pO2, Arterial 85 85 - 95 mm Hg    POCT SO2, Arterial 99 94 - 100 %    POCT Oxy Hemoglobin, Arterial 95.7 94.0 - 98.0 %    POCT Hematocrit Calculated, Arterial 31.0 (L) 41.0 - 52.0 %    POCT Sodium, Arterial 132 (L) 136 - 145 mmol/L    POCT Potassium, Arterial 4.1 3.5 - 5.3 mmol/L    POCT Chloride, Arterial 100 98 - 107 mmol/L    POCT Ionized Calcium, Arterial 1.22 1.10 - 1.33 mmol/L    POCT Glucose, Arterial 129 (H) 74 - 99 mg/dL    POCT Lactate, Arterial 2.2 (H) 0.4 - 2.0 mmol/L    POCT Base Excess, Arterial -1.9 -2.0 - 3.0 mmol/L    POCT HCO3 Calculated, Arterial 21.9 (L) 22.0 - 26.0 mmol/L    POCT Hemoglobin, Arterial 10.2 (L) 13.5 - 17.5 g/dL    POCT Anion Gap, Arterial 14 10 - 25 mmo/L    Patient Temperature 37.0 degrees Celsius    FiO2 50 %   Calcium, ionized    Collection Time: 04/24/24  4:11 AM   Result Value Ref Range    POCT Calcium, Ionized 1.17 1.1 - 1.33 mmol/L   CBC    Collection Time: 04/24/24  4:11 AM   Result Value Ref Range    WBC 10.4 4.4 - 11.3 x10*3/uL    nRBC 2.9 (H) 0.0 - 0.0 /100 WBCs    RBC 3.39 (L) 4.50 - 5.90 x10*6/uL    Hemoglobin 9.8 (L) 13.5 - 17.5 g/dL    Hematocrit 27.4 (L) 41.0 - 52.0 %    MCV 81 80 - 100 fL    MCH 28.9 26.0 - 34.0 pg    MCHC 35.8 32.0 - 36.0 g/dL    RDW 19.9 (H) 11.5 - 14.5 %    Platelets 101 (L) 150 - 450 x10*3/uL   Coagulation Screen    Collection Time: 04/24/24  4:11 AM   Result Value Ref Range    Protime 13.8 (H) 9.8 - 12.8 seconds    INR 1.2 (H)  0.9 - 1.1    aPTT 47 (H) 27 - 38 seconds   Hepatic function panel    Collection Time: 04/24/24  4:11 AM   Result Value Ref Range    Albumin 4.0 3.4 - 5.0 g/dL    Bilirubin, Total 26.7 (H) 0.0 - 1.2 mg/dL    Bilirubin, Direct 17.1 (H) 0.0 - 0.3 mg/dL    Alkaline Phosphatase 160 (H) 33 - 136 U/L    ALT 13 10 - 52 U/L    AST 89 (H) 9 - 39 U/L    Total Protein 6.0 (L) 6.4 - 8.2 g/dL   Magnesium    Collection Time: 04/24/24  4:11 AM   Result Value Ref Range    Magnesium 2.55 (H) 1.60 - 2.40 mg/dL   Basic Metabolic Panel    Collection Time: 04/24/24  4:11 AM   Result Value Ref Range    Glucose 122 (H) 74 - 99 mg/dL    Sodium 135 (L) 136 - 145 mmol/L    Potassium 4.0 3.5 - 5.3 mmol/L    Chloride 99 98 - 107 mmol/L    Bicarbonate 23 21 - 32 mmol/L    Anion Gap 17 10 - 20 mmol/L    Urea Nitrogen 27 (H) 6 - 23 mg/dL    Creatinine 0.84 0.50 - 1.30 mg/dL    eGFR >90 >60 mL/min/1.73m*2    Calcium 9.0 8.6 - 10.6 mg/dL   Phosphorus    Collection Time: 04/24/24  4:11 AM   Result Value Ref Range    Phosphorus 1.9 (L) 2.5 - 4.9 mg/dL   Blood Gas Arterial Full Panel    Collection Time: 04/24/24  4:12 AM   Result Value Ref Range    POCT pH, Arterial 7.43 (H) 7.38 - 7.42 pH    POCT pCO2, Arterial 35 (L) 38 - 42 mm Hg    POCT pO2, Arterial 103 (H) 85 - 95 mm Hg    POCT SO2, Arterial 99 94 - 100 %    POCT Oxy Hemoglobin, Arterial 96.6 94.0 - 98.0 %    POCT Hematocrit Calculated, Arterial 32.0 (L) 41.0 - 52.0 %    POCT Sodium, Arterial 132 (L) 136 - 145 mmol/L    POCT Potassium, Arterial 4.1 3.5 - 5.3 mmol/L    POCT Chloride, Arterial 101 98 - 107 mmol/L    POCT Ionized Calcium, Arterial 1.21 1.10 - 1.33 mmol/L    POCT Glucose, Arterial 123 (H) 74 - 99 mg/dL    POCT Lactate, Arterial 2.5 (H) 0.4 - 2.0 mmol/L    POCT Base Excess, Arterial -0.8 -2.0 - 3.0 mmol/L    POCT HCO3 Calculated, Arterial 23.2 22.0 - 26.0 mmol/L    POCT Hemoglobin, Arterial 10.7 (L) 13.5 - 17.5 g/dL    POCT Anion Gap, Arterial 12 10 - 25 mmo/L    Patient Temperature  37.0 degrees Celsius    FiO2 50 %   POCT GLUCOSE    Collection Time: 04/24/24  8:13 AM   Result Value Ref Range    POCT Glucose 131 (H) 74 - 99 mg/dL   Blood Gas Arterial Full Panel    Collection Time: 04/24/24 10:31 AM   Result Value Ref Range    POCT pH, Arterial 7.43 (H) 7.38 - 7.42 pH    POCT pCO2, Arterial 33 (L) 38 - 42 mm Hg    POCT pO2, Arterial 68 (L) 85 - 95 mm Hg    POCT SO2, Arterial 95 94 - 100 %    POCT Oxy Hemoglobin, Arterial 93.2 (L) 94.0 - 98.0 %    POCT Hematocrit Calculated, Arterial 35.0 (L) 41.0 - 52.0 %    POCT Sodium, Arterial 132 (L) 136 - 145 mmol/L    POCT Potassium, Arterial 3.7 3.5 - 5.3 mmol/L    POCT Chloride, Arterial 101 98 - 107 mmol/L    POCT Ionized Calcium, Arterial 1.16 1.10 - 1.33 mmol/L    POCT Glucose, Arterial 126 (H) 74 - 99 mg/dL    POCT Lactate, Arterial 2.2 (H) 0.4 - 2.0 mmol/L    POCT Base Excess, Arterial -1.8 -2.0 - 3.0 mmol/L    POCT HCO3 Calculated, Arterial 21.9 (L) 22.0 - 26.0 mmol/L    POCT Hemoglobin, Arterial 11.6 (L) 13.5 - 17.5 g/dL    POCT Anion Gap, Arterial 13 10 - 25 mmo/L    Patient Temperature 37.0 degrees Celsius    FiO2 40 %   POCT GLUCOSE    Collection Time: 04/24/24 12:02 PM   Result Value Ref Range    POCT Glucose 149 (H) 74 - 99 mg/dL   CBC    Collection Time: 04/24/24 12:18 PM   Result Value Ref Range    WBC 11.2 4.4 - 11.3 x10*3/uL    nRBC 4.7 (H) 0.0 - 0.0 /100 WBCs    RBC 3.38 (L) 4.50 - 5.90 x10*6/uL    Hemoglobin 9.9 (L) 13.5 - 17.5 g/dL    Hematocrit 27.3 (L) 41.0 - 52.0 %    MCV 81 80 - 100 fL    MCH 29.3 26.0 - 34.0 pg    MCHC 36.3 (H) 32.0 - 36.0 g/dL    RDW 21.0 (H) 11.5 - 14.5 %    Platelets 93 (L) 150 - 450 x10*3/uL   Magnesium    Collection Time: 04/24/24 12:18 PM   Result Value Ref Range    Magnesium 2.72 (H) 1.60 - 2.40 mg/dL   Renal Function Panel    Collection Time: 04/24/24 12:18 PM   Result Value Ref Range    Glucose 136 (H) 74 - 99 mg/dL    Sodium 137 136 - 145 mmol/L    Potassium 4.3 3.5 - 5.3 mmol/L    Chloride 100 98 - 107  mmol/L    Bicarbonate 25 21 - 32 mmol/L    Anion Gap 16 10 - 20 mmol/L    Urea Nitrogen 25 (H) 6 - 23 mg/dL    Creatinine 0.84 0.50 - 1.30 mg/dL    eGFR >90 >60 mL/min/1.73m*2    Calcium 9.2 8.6 - 10.6 mg/dL    Phosphorus 2.8 2.5 - 4.9 mg/dL    Albumin 4.0 3.4 - 5.0 g/dL   POCT GLUCOSE    Collection Time: 04/24/24  3:28 PM   Result Value Ref Range    POCT Glucose 138 (H) 74 - 99 mg/dL

## 2024-04-25 ENCOUNTER — APPOINTMENT (OUTPATIENT)
Dept: RADIOLOGY | Facility: HOSPITAL | Age: 64
DRG: 003 | End: 2024-04-25
Payer: COMMERCIAL

## 2024-04-25 LAB
ABO GROUP (TYPE) IN BLOOD: NORMAL
ALBUMIN SERPL BCP-MCNC: 3.5 G/DL (ref 3.4–5)
ALBUMIN SERPL BCP-MCNC: 3.6 G/DL (ref 3.4–5)
ALBUMIN SERPL BCP-MCNC: 3.7 G/DL (ref 3.4–5)
ALP SERPL-CCNC: 159 U/L (ref 33–136)
ALP SERPL-CCNC: 161 U/L (ref 33–136)
ALP SERPL-CCNC: 164 U/L (ref 33–136)
ALT SERPL W P-5'-P-CCNC: 15 U/L (ref 10–52)
ALT SERPL W P-5'-P-CCNC: 16 U/L (ref 10–52)
ALT SERPL W P-5'-P-CCNC: 17 U/L (ref 10–52)
ANION GAP BLDA CALCULATED.4IONS-SCNC: 13 MMO/L (ref 10–25)
ANION GAP SERPL CALC-SCNC: 17 MMOL/L (ref 10–20)
ANION GAP SERPL CALC-SCNC: 17 MMOL/L (ref 10–20)
ANION GAP SERPL CALC-SCNC: 18 MMOL/L (ref 10–20)
ANTIBODY SCREEN: NORMAL
APTT PPP: 49 SECONDS (ref 27–38)
APTT PPP: 55 SECONDS (ref 27–38)
APTT PPP: 55 SECONDS (ref 27–38)
AST SERPL W P-5'-P-CCNC: 103 U/L (ref 9–39)
AST SERPL W P-5'-P-CCNC: 103 U/L (ref 9–39)
AST SERPL W P-5'-P-CCNC: 106 U/L (ref 9–39)
BACTERIA BLD CULT: NORMAL
BACTERIA BLD CULT: NORMAL
BACTERIA SPEC CULT: ABNORMAL
BASE EXCESS BLDA CALC-SCNC: -1.3 MMOL/L (ref -2–3)
BILIRUB DIRECT SERPL-MCNC: 17.1 MG/DL (ref 0–0.3)
BILIRUB DIRECT SERPL-MCNC: 17.4 MG/DL (ref 0–0.3)
BILIRUB DIRECT SERPL-MCNC: 17.7 MG/DL (ref 0–0.3)
BILIRUB SERPL-MCNC: 25.8 MG/DL (ref 0–1.2)
BILIRUB SERPL-MCNC: 26.4 MG/DL (ref 0–1.2)
BILIRUB SERPL-MCNC: 26.6 MG/DL (ref 0–1.2)
BODY TEMPERATURE: 37 DEGREES CELSIUS
BUN SERPL-MCNC: 22 MG/DL (ref 6–23)
BUN SERPL-MCNC: 23 MG/DL (ref 6–23)
BUN SERPL-MCNC: 24 MG/DL (ref 6–23)
CA-I BLD-SCNC: 1.13 MMOL/L (ref 1.1–1.33)
CA-I BLD-SCNC: 1.16 MMOL/L (ref 1.1–1.33)
CA-I BLD-SCNC: 1.16 MMOL/L (ref 1.1–1.33)
CA-I BLDA-SCNC: 1.19 MMOL/L (ref 1.1–1.33)
CALCIUM SERPL-MCNC: 8.6 MG/DL (ref 8.6–10.6)
CALCIUM SERPL-MCNC: 8.6 MG/DL (ref 8.6–10.6)
CALCIUM SERPL-MCNC: 8.7 MG/DL (ref 8.6–10.6)
CHLORIDE BLDA-SCNC: 101 MMOL/L (ref 98–107)
CHLORIDE SERPL-SCNC: 100 MMOL/L (ref 98–107)
CHLORIDE SERPL-SCNC: 102 MMOL/L (ref 98–107)
CHLORIDE SERPL-SCNC: 99 MMOL/L (ref 98–107)
CO2 SERPL-SCNC: 22 MMOL/L (ref 21–32)
CO2 SERPL-SCNC: 22 MMOL/L (ref 21–32)
CO2 SERPL-SCNC: 23 MMOL/L (ref 21–32)
CREAT SERPL-MCNC: 0.72 MG/DL (ref 0.5–1.3)
CREAT SERPL-MCNC: 0.79 MG/DL (ref 0.5–1.3)
CREAT SERPL-MCNC: 0.81 MG/DL (ref 0.5–1.3)
EGFRCR SERPLBLD CKD-EPI 2021: >90 ML/MIN/1.73M*2
ERYTHROCYTE [DISTWIDTH] IN BLOOD BY AUTOMATED COUNT: 20.3 % (ref 11.5–14.5)
ERYTHROCYTE [DISTWIDTH] IN BLOOD BY AUTOMATED COUNT: 20.7 % (ref 11.5–14.5)
ERYTHROCYTE [DISTWIDTH] IN BLOOD BY AUTOMATED COUNT: 20.9 % (ref 11.5–14.5)
GLUCOSE BLD MANUAL STRIP-MCNC: 145 MG/DL (ref 74–99)
GLUCOSE BLD MANUAL STRIP-MCNC: 148 MG/DL (ref 74–99)
GLUCOSE BLD MANUAL STRIP-MCNC: 149 MG/DL (ref 74–99)
GLUCOSE BLD MANUAL STRIP-MCNC: 150 MG/DL (ref 74–99)
GLUCOSE BLD MANUAL STRIP-MCNC: 152 MG/DL (ref 74–99)
GLUCOSE BLD MANUAL STRIP-MCNC: 155 MG/DL (ref 74–99)
GLUCOSE BLD MANUAL STRIP-MCNC: 163 MG/DL (ref 74–99)
GLUCOSE BLDA-MCNC: 134 MG/DL (ref 74–99)
GLUCOSE SERPL-MCNC: 132 MG/DL (ref 74–99)
GLUCOSE SERPL-MCNC: 142 MG/DL (ref 74–99)
GLUCOSE SERPL-MCNC: 144 MG/DL (ref 74–99)
GRAM STN SPEC: ABNORMAL
HCO3 BLDA-SCNC: 22.6 MMOL/L (ref 22–26)
HCT VFR BLD AUTO: 26.2 % (ref 41–52)
HCT VFR BLD AUTO: 26.5 % (ref 41–52)
HCT VFR BLD AUTO: 27.1 % (ref 41–52)
HCT VFR BLD EST: 30 % (ref 41–52)
HGB BLD-MCNC: 9.4 G/DL (ref 13.5–17.5)
HGB BLD-MCNC: 9.5 G/DL (ref 13.5–17.5)
HGB BLD-MCNC: 9.8 G/DL (ref 13.5–17.5)
HGB BLDA-MCNC: 10.1 G/DL (ref 13.5–17.5)
INHALED O2 CONCENTRATION: 40 %
INR PPP: 1.2 (ref 0.9–1.1)
INR PPP: 1.3 (ref 0.9–1.1)
INR PPP: 1.3 (ref 0.9–1.1)
LACTATE BLDA-SCNC: 2.3 MMOL/L (ref 0.4–2)
MAGNESIUM SERPL-MCNC: 2.66 MG/DL (ref 1.6–2.4)
MAGNESIUM SERPL-MCNC: 2.72 MG/DL (ref 1.6–2.4)
MAGNESIUM SERPL-MCNC: 2.75 MG/DL (ref 1.6–2.4)
MCH RBC QN AUTO: 29.3 PG (ref 26–34)
MCH RBC QN AUTO: 29.3 PG (ref 26–34)
MCH RBC QN AUTO: 29.4 PG (ref 26–34)
MCHC RBC AUTO-ENTMCNC: 35.8 G/DL (ref 32–36)
MCHC RBC AUTO-ENTMCNC: 35.9 G/DL (ref 32–36)
MCHC RBC AUTO-ENTMCNC: 36.2 G/DL (ref 32–36)
MCV RBC AUTO: 81 FL (ref 80–100)
MCV RBC AUTO: 82 FL (ref 80–100)
MCV RBC AUTO: 82 FL (ref 80–100)
NRBC BLD-RTO: 4.7 /100 WBCS (ref 0–0)
NRBC BLD-RTO: 4.9 /100 WBCS (ref 0–0)
NRBC BLD-RTO: 5.7 /100 WBCS (ref 0–0)
OXYHGB MFR BLDA: 95.5 % (ref 94–98)
PCO2 BLDA: 34 MM HG (ref 38–42)
PH BLDA: 7.43 PH (ref 7.38–7.42)
PHOSPHATE SERPL-MCNC: 2.3 MG/DL (ref 2.5–4.9)
PHOSPHATE SERPL-MCNC: 2.3 MG/DL (ref 2.5–4.9)
PHOSPHATE SERPL-MCNC: 2.6 MG/DL (ref 2.5–4.9)
PLATELET # BLD AUTO: 74 X10*3/UL (ref 150–450)
PLATELET # BLD AUTO: 76 X10*3/UL (ref 150–450)
PLATELET # BLD AUTO: 85 X10*3/UL (ref 150–450)
PO2 BLDA: 82 MM HG (ref 85–95)
POTASSIUM BLDA-SCNC: 4.2 MMOL/L (ref 3.5–5.3)
POTASSIUM SERPL-SCNC: 4.2 MMOL/L (ref 3.5–5.3)
POTASSIUM SERPL-SCNC: 4.2 MMOL/L (ref 3.5–5.3)
POTASSIUM SERPL-SCNC: 4.3 MMOL/L (ref 3.5–5.3)
PROT SERPL-MCNC: 5 G/DL (ref 6.4–8.2)
PROT SERPL-MCNC: 5.3 G/DL (ref 6.4–8.2)
PROT SERPL-MCNC: 5.3 G/DL (ref 6.4–8.2)
PROTHROMBIN TIME: 13.7 SECONDS (ref 9.8–12.8)
PROTHROMBIN TIME: 14.1 SECONDS (ref 9.8–12.8)
PROTHROMBIN TIME: 14.3 SECONDS (ref 9.8–12.8)
RBC # BLD AUTO: 3.21 X10*6/UL (ref 4.5–5.9)
RBC # BLD AUTO: 3.24 X10*6/UL (ref 4.5–5.9)
RBC # BLD AUTO: 3.33 X10*6/UL (ref 4.5–5.9)
RH FACTOR (ANTIGEN D): NORMAL
SAO2 % BLDA: 98 % (ref 94–100)
SODIUM BLDA-SCNC: 132 MMOL/L (ref 136–145)
SODIUM SERPL-SCNC: 135 MMOL/L (ref 136–145)
SODIUM SERPL-SCNC: 136 MMOL/L (ref 136–145)
SODIUM SERPL-SCNC: 137 MMOL/L (ref 136–145)
WBC # BLD AUTO: 14 X10*3/UL (ref 4.4–11.3)
WBC # BLD AUTO: 14.6 X10*3/UL (ref 4.4–11.3)
WBC # BLD AUTO: 14.8 X10*3/UL (ref 4.4–11.3)

## 2024-04-25 PROCEDURE — 2500000001 HC RX 250 WO HCPCS SELF ADMINISTERED DRUGS (ALT 637 FOR MEDICARE OP): Performed by: STUDENT IN AN ORGANIZED HEALTH CARE EDUCATION/TRAINING PROGRAM

## 2024-04-25 PROCEDURE — P9047 ALBUMIN (HUMAN), 25%, 50ML: HCPCS | Mod: JZ | Performed by: STUDENT IN AN ORGANIZED HEALTH CARE EDUCATION/TRAINING PROGRAM

## 2024-04-25 PROCEDURE — 2500000004 HC RX 250 GENERAL PHARMACY W/ HCPCS (ALT 636 FOR OP/ED): Mod: JZ | Performed by: NURSE PRACTITIONER

## 2024-04-25 PROCEDURE — 82248 BILIRUBIN DIRECT: CPT | Performed by: STUDENT IN AN ORGANIZED HEALTH CARE EDUCATION/TRAINING PROGRAM

## 2024-04-25 PROCEDURE — 2500000004 HC RX 250 GENERAL PHARMACY W/ HCPCS (ALT 636 FOR OP/ED): Performed by: STUDENT IN AN ORGANIZED HEALTH CARE EDUCATION/TRAINING PROGRAM

## 2024-04-25 PROCEDURE — 84132 ASSAY OF SERUM POTASSIUM: CPT | Performed by: STUDENT IN AN ORGANIZED HEALTH CARE EDUCATION/TRAINING PROGRAM

## 2024-04-25 PROCEDURE — 94003 VENT MGMT INPAT SUBQ DAY: CPT

## 2024-04-25 PROCEDURE — 86923 COMPATIBILITY TEST ELECTRIC: CPT

## 2024-04-25 PROCEDURE — 83735 ASSAY OF MAGNESIUM: CPT | Performed by: STUDENT IN AN ORGANIZED HEALTH CARE EDUCATION/TRAINING PROGRAM

## 2024-04-25 PROCEDURE — 99232 SBSQ HOSP IP/OBS MODERATE 35: CPT

## 2024-04-25 PROCEDURE — 37799 UNLISTED PX VASCULAR SURGERY: CPT | Performed by: STUDENT IN AN ORGANIZED HEALTH CARE EDUCATION/TRAINING PROGRAM

## 2024-04-25 PROCEDURE — 2500000004 HC RX 250 GENERAL PHARMACY W/ HCPCS (ALT 636 FOR OP/ED)

## 2024-04-25 PROCEDURE — 71045 X-RAY EXAM CHEST 1 VIEW: CPT

## 2024-04-25 PROCEDURE — 2500000005 HC RX 250 GENERAL PHARMACY W/O HCPCS

## 2024-04-25 PROCEDURE — C9113 INJ PANTOPRAZOLE SODIUM, VIA: HCPCS | Performed by: STUDENT IN AN ORGANIZED HEALTH CARE EDUCATION/TRAINING PROGRAM

## 2024-04-25 PROCEDURE — P9045 ALBUMIN (HUMAN), 5%, 250 ML: HCPCS | Mod: JZ | Performed by: PHYSICIAN ASSISTANT

## 2024-04-25 PROCEDURE — 82947 ASSAY GLUCOSE BLOOD QUANT: CPT

## 2024-04-25 PROCEDURE — 2500000001 HC RX 250 WO HCPCS SELF ADMINISTERED DRUGS (ALT 637 FOR MEDICARE OP): Performed by: NURSE PRACTITIONER

## 2024-04-25 PROCEDURE — 99291 CRITICAL CARE FIRST HOUR: CPT

## 2024-04-25 PROCEDURE — 2500000004 HC RX 250 GENERAL PHARMACY W/ HCPCS (ALT 636 FOR OP/ED): Mod: JZ | Performed by: PHYSICIAN ASSISTANT

## 2024-04-25 PROCEDURE — 2500000004 HC RX 250 GENERAL PHARMACY W/ HCPCS (ALT 636 FOR OP/ED): Performed by: NURSE PRACTITIONER

## 2024-04-25 PROCEDURE — 2500000005 HC RX 250 GENERAL PHARMACY W/O HCPCS: Performed by: STUDENT IN AN ORGANIZED HEALTH CARE EDUCATION/TRAINING PROGRAM

## 2024-04-25 PROCEDURE — 2500000002 HC RX 250 W HCPCS SELF ADMINISTERED DRUGS (ALT 637 FOR MEDICARE OP, ALT 636 FOR OP/ED): Performed by: NURSE PRACTITIONER

## 2024-04-25 PROCEDURE — 85027 COMPLETE CBC AUTOMATED: CPT | Performed by: STUDENT IN AN ORGANIZED HEALTH CARE EDUCATION/TRAINING PROGRAM

## 2024-04-25 PROCEDURE — 86901 BLOOD TYPING SEROLOGIC RH(D): CPT | Performed by: STUDENT IN AN ORGANIZED HEALTH CARE EDUCATION/TRAINING PROGRAM

## 2024-04-25 PROCEDURE — P9047 ALBUMIN (HUMAN), 25%, 50ML: HCPCS | Mod: JZ | Performed by: NURSE PRACTITIONER

## 2024-04-25 PROCEDURE — 2020000001 HC ICU ROOM DAILY

## 2024-04-25 PROCEDURE — 85610 PROTHROMBIN TIME: CPT | Performed by: STUDENT IN AN ORGANIZED HEALTH CARE EDUCATION/TRAINING PROGRAM

## 2024-04-25 PROCEDURE — 84100 ASSAY OF PHOSPHORUS: CPT | Performed by: STUDENT IN AN ORGANIZED HEALTH CARE EDUCATION/TRAINING PROGRAM

## 2024-04-25 PROCEDURE — 90937 HEMODIALYSIS REPEATED EVAL: CPT

## 2024-04-25 PROCEDURE — 71045 X-RAY EXAM CHEST 1 VIEW: CPT | Performed by: RADIOLOGY

## 2024-04-25 PROCEDURE — 90945 DIALYSIS ONE EVALUATION: CPT | Performed by: INTERNAL MEDICINE

## 2024-04-25 PROCEDURE — 82330 ASSAY OF CALCIUM: CPT | Performed by: STUDENT IN AN ORGANIZED HEALTH CARE EDUCATION/TRAINING PROGRAM

## 2024-04-25 PROCEDURE — 2500000005 HC RX 250 GENERAL PHARMACY W/O HCPCS: Performed by: PHYSICIAN ASSISTANT

## 2024-04-25 RX ORDER — ALBUMIN HUMAN 250 G/1000ML
25 SOLUTION INTRAVENOUS EVERY 6 HOURS
Status: COMPLETED | OUTPATIENT
Start: 2024-04-25 | End: 2024-04-26

## 2024-04-25 RX ORDER — ALBUMIN HUMAN 50 G/1000ML
25 SOLUTION INTRAVENOUS ONCE
Status: COMPLETED | OUTPATIENT
Start: 2024-04-25 | End: 2024-04-25

## 2024-04-25 RX ORDER — ALBUMIN HUMAN 250 G/1000ML
25 SOLUTION INTRAVENOUS ONCE
Status: COMPLETED | OUTPATIENT
Start: 2024-04-25 | End: 2024-04-25

## 2024-04-25 RX ADMIN — PANTOPRAZOLE SODIUM 40 MG: 40 INJECTION, POWDER, FOR SOLUTION INTRAVENOUS at 20:01

## 2024-04-25 RX ADMIN — MEROPENEM 1 G: 1 INJECTION, POWDER, FOR SOLUTION INTRAVENOUS at 08:08

## 2024-04-25 RX ADMIN — ALBUMIN HUMAN 25 G: 0.25 SOLUTION INTRAVENOUS at 15:21

## 2024-04-25 RX ADMIN — HYDROCORTISONE SODIUM SUCCINATE 50 MG: 100 INJECTION, POWDER, FOR SOLUTION INTRAMUSCULAR; INTRAVENOUS at 23:30

## 2024-04-25 RX ADMIN — THIAMINE HCL TAB 100 MG 500 MG: 100 TAB at 00:53

## 2024-04-25 RX ADMIN — SODIUM PHOSPHATE, MONOBASIC, MONOHYDRATE AND SODIUM PHOSPHATE, DIBASIC, ANHYDROUS 15 MMOL: 142; 276 INJECTION, SOLUTION INTRAVENOUS at 16:27

## 2024-04-25 RX ADMIN — MIDODRINE HYDROCHLORIDE 20 MG: 10 TABLET ORAL at 16:36

## 2024-04-25 RX ADMIN — THIAMINE HCL TAB 100 MG 500 MG: 100 TAB at 17:10

## 2024-04-25 RX ADMIN — HYDROCORTISONE SODIUM SUCCINATE 50 MG: 100 INJECTION, POWDER, FOR SOLUTION INTRAMUSCULAR; INTRAVENOUS at 17:11

## 2024-04-25 RX ADMIN — MIDODRINE HYDROCHLORIDE 20 MG: 10 TABLET ORAL at 00:53

## 2024-04-25 RX ADMIN — SODIUM PHOSPHATE, MONOBASIC, MONOHYDRATE AND SODIUM PHOSPHATE, DIBASIC, ANHYDROUS 15 MMOL: 142; 276 INJECTION, SOLUTION INTRAVENOUS at 04:47

## 2024-04-25 RX ADMIN — VASOPRESSIN 0.03 UNITS/MIN: 0.2 INJECTION INTRAVENOUS at 01:46

## 2024-04-25 RX ADMIN — NOREPINEPHRINE BITARTRATE 0.13 MCG/KG/MIN: 8 INJECTION, SOLUTION INTRAVENOUS at 08:58

## 2024-04-25 RX ADMIN — ALBUMIN HUMAN 25 G: 0.25 SOLUTION INTRAVENOUS at 00:28

## 2024-04-25 RX ADMIN — FLUDROCORTISONE ACETATE 0.1 MG: 0.1 TABLET ORAL at 03:12

## 2024-04-25 RX ADMIN — Medication 40 PERCENT: at 08:00

## 2024-04-25 RX ADMIN — FLUDROCORTISONE ACETATE 0.1 MG: 0.1 TABLET ORAL at 15:00

## 2024-04-25 RX ADMIN — MEROPENEM 1 G: 1 INJECTION, POWDER, FOR SOLUTION INTRAVENOUS at 20:01

## 2024-04-25 RX ADMIN — MIDODRINE HYDROCHLORIDE 20 MG: 10 TABLET ORAL at 08:48

## 2024-04-25 RX ADMIN — ALBUMIN HUMAN 25 G: 0.25 SOLUTION INTRAVENOUS at 10:17

## 2024-04-25 RX ADMIN — HEPARIN SODIUM 5000 UNITS: 5000 INJECTION INTRAVENOUS; SUBCUTANEOUS at 03:12

## 2024-04-25 RX ADMIN — PANTOPRAZOLE SODIUM 40 MG: 40 INJECTION, POWDER, FOR SOLUTION INTRAVENOUS at 08:08

## 2024-04-25 RX ADMIN — HYDROCORTISONE SODIUM SUCCINATE 50 MG: 100 INJECTION, POWDER, FOR SOLUTION INTRAMUSCULAR; INTRAVENOUS at 05:12

## 2024-04-25 RX ADMIN — ALBUMIN HUMAN 25 G: 0.05 INJECTION, SOLUTION INTRAVENOUS at 14:06

## 2024-04-25 RX ADMIN — ALBUMIN HUMAN 25 G: 0.25 SOLUTION INTRAVENOUS at 21:16

## 2024-04-25 RX ADMIN — VASOPRESSIN 0.03 UNITS/MIN: 0.2 INJECTION INTRAVENOUS at 08:58

## 2024-04-25 RX ADMIN — INSULIN GLARGINE 12 UNITS: 100 INJECTION, SOLUTION SUBCUTANEOUS at 08:07

## 2024-04-25 RX ADMIN — INSULIN GLARGINE 12 UNITS: 100 INJECTION, SOLUTION SUBCUTANEOUS at 20:01

## 2024-04-25 RX ADMIN — HEPARIN SODIUM 5000 UNITS: 5000 INJECTION INTRAVENOUS; SUBCUTANEOUS at 20:01

## 2024-04-25 RX ADMIN — VASOPRESSIN 0.03 UNITS/MIN: 0.2 INJECTION INTRAVENOUS at 21:19

## 2024-04-25 RX ADMIN — Medication 40 PERCENT: at 20:00

## 2024-04-25 RX ADMIN — THIAMINE HCL TAB 100 MG 500 MG: 100 TAB at 08:48

## 2024-04-25 RX ADMIN — HYDROCORTISONE SODIUM SUCCINATE 50 MG: 100 INJECTION, POWDER, FOR SOLUTION INTRAMUSCULAR; INTRAVENOUS at 13:08

## 2024-04-25 RX ADMIN — INSULIN LISPRO 4 UNITS: 100 INJECTION, SOLUTION INTRAVENOUS; SUBCUTANEOUS at 17:02

## 2024-04-25 RX ADMIN — HEPARIN SODIUM 5000 UNITS: 5000 INJECTION INTRAVENOUS; SUBCUTANEOUS at 13:08

## 2024-04-25 ASSESSMENT — COGNITIVE AND FUNCTIONAL STATUS - GENERAL
CLIMB 3 TO 5 STEPS WITH RAILING: TOTAL
MOBILITY SCORE: 6
HELP NEEDED FOR BATHING: TOTAL
PERSONAL GROOMING: TOTAL
TOILETING: TOTAL
DRESSING REGULAR UPPER BODY CLOTHING: TOTAL
DAILY ACTIVITIY SCORE: 6
MOVING TO AND FROM BED TO CHAIR: TOTAL
EATING MEALS: TOTAL
TURNING FROM BACK TO SIDE WHILE IN FLAT BAD: TOTAL
MOVING FROM LYING ON BACK TO SITTING ON SIDE OF FLAT BED WITH BEDRAILS: TOTAL
STANDING UP FROM CHAIR USING ARMS: TOTAL
WALKING IN HOSPITAL ROOM: TOTAL
DRESSING REGULAR LOWER BODY CLOTHING: TOTAL

## 2024-04-25 ASSESSMENT — PAIN - FUNCTIONAL ASSESSMENT
PAIN_FUNCTIONAL_ASSESSMENT: 0-10
PAIN_FUNCTIONAL_ASSESSMENT: CPOT (CRITICAL CARE PAIN OBSERVATION TOOL)
PAIN_FUNCTIONAL_ASSESSMENT: CPOT (CRITICAL CARE PAIN OBSERVATION TOOL)
PAIN_FUNCTIONAL_ASSESSMENT: 0-10
PAIN_FUNCTIONAL_ASSESSMENT: CPOT (CRITICAL CARE PAIN OBSERVATION TOOL)
PAIN_FUNCTIONAL_ASSESSMENT: CPOT (CRITICAL CARE PAIN OBSERVATION TOOL)

## 2024-04-25 ASSESSMENT — PAIN SCALES - GENERAL: PAINLEVEL_OUTOF10: 0 - NO PAIN

## 2024-04-25 NOTE — PROGRESS NOTES
Jason Hollins is a 63 y.o. male on day 51 of admission presenting with Soft tissue sarcoma (Multi).    Subjective   Overnight his blood pressure was soft and norepinephrine drip increased to 0.1 and was given 100 cc of 25% albumin.  Fluid removal from CVVH decreased to 20 mL/h    Objective     Physical Exam  Constitutional:       General: He is not in acute distress.     Appearance: He is obese. He is ill-appearing.   HENT:      Nose:      Comments: Dobhoff in R nare, bridled in place.      Mouth/Throat:      Mouth: Mucous membranes are moist.   Eyes:      General: Scleral icterus present.      Extraocular Movements: Extraocular movements intact.      Pupils: Pupils are equal, round, and reactive to light.   Neck:      Comments: LIJ trialysis in place, dressing c/d/i.   Cardiovascular:      Rate and Rhythm: Normal rate and regular rhythm.      Pulses:           Radial pulses are 1+ on the right side and 1+ on the left side.        Dorsalis pedis pulses are detected w/ Doppler on the right side and detected w/ Doppler on the left side.        Posterior tibial pulses are detected w/ Doppler on the right side and detected w/ Doppler on the left side.      Heart sounds: Normal heart sounds.   Pulmonary:      Comments: Diffuse inspiratory rales and diminished bases bilaterally. Currently on 40% fio2 SIMV mode   Abdominal:      General: Bowel sounds are normal. There is distension.      Tenderness: There is no abdominal tenderness.      Comments: Obese abdomen, firm, anasarca.   Genitourinary:     Penis: Swelling present.       Comments: Scrotal edema. Marino in place with minimal dark sharee UOP.  Musculoskeletal:      Right lower leg: 3+ Edema present.      Left lower leg: 3+ Edema present.      Comments: 3+ pitting edema noted bilaterally in upper and lower extremities. R thigh with mild swelling (improved from prior day) and with surgical incision sites clean, dry, intact. 3 ANDERSON drains and wound vac with minimal output this  "morning.   Skin:     General: Skin is warm and dry.      Coloration: Skin is jaundiced.      Comments: ANDERSON drain x2 and wound vac to R gluteal flap site.   Neurological:      Mental Status: He is alert.      Comments: Alert, nodding appropriately to yes/no questions. Today Following less simple commands with BUE and LLE compared to yesterday, no movement with RLE.   Psychiatric:      Comments: Calm and cooperative.       Last Recorded Vitals  Blood pressure 119/69, pulse 104, temperature 36.1 °C (97 °F), temperature source Temporal, resp. rate (!) 0, height 1.778 m (5' 10\"), weight 120 kg (264 lb 8.8 oz), SpO2 97%.  Intake/Output last 3 Shifts:  I/O last 3 completed shifts:  In: 2503 (20.9 mL/kg) [I.V.:573 (4.8 mL/kg); NG/GT:1280; IV Piggyback:650]  Out: 6385 (53.2 mL/kg) [Urine:13 (0 mL/kg/hr); Drains:345; Other:6027]  Weight: 120 kg     Relevant Results  Scheduled medications  fludrocortisone, 0.1 mg, nasogastric tube, q12h  heparin (porcine), 5,000 Units, subcutaneous, q8h  hydrocortisone sodium succinate, 50 mg, intravenous, q6h  insulin glargine, 12 Units, subcutaneous, q12h  insulin lispro, 0-20 Units, subcutaneous, q4h  meropenem, 1 g, intravenous, q12h  midodrine, 20 mg, orogastric tube, q8h  oxygen, , inhalation, Continuous - Inhalation  pantoprazole, 40 mg, intravenous, BID  sodium phosphate, 15 mmol, intravenous, Once  thiamine, 500 mg, nasogastric tube, q8h      Continuous medications  lactated Ringer's, 5 mL/hr, Last Rate: 5 mL/hr (04/24/24 1600)  norepinephrine, 0.01-1 mcg/kg/min (Dosing Weight), Last Rate: 0.09 mcg/kg/min (04/25/24 0700)  PrismaSol 4/2.5, 3,000 mL/hr, Last Rate: 3,000 mL/hr (04/24/24 1544)  vasopressin, 0.01-0.03 Units/min, Last Rate: 0.03 Units/min (04/25/24 0700)      PRN medications  PRN medications: acetaminophen, alteplase, alteplase, calcium gluconate, calcium gluconate, dextrose, glucagon, heparin, heparin, HYDROmorphone, HYDROmorphone, labetaloL, magnesium sulfate, magnesium " sulfate, midodrine, sodium chloride  Results for orders placed or performed during the hospital encounter of 03/05/24 (from the past 24 hour(s))   POCT GLUCOSE   Result Value Ref Range    POCT Glucose 131 (H) 74 - 99 mg/dL   Blood Gas Arterial Full Panel   Result Value Ref Range    POCT pH, Arterial 7.43 (H) 7.38 - 7.42 pH    POCT pCO2, Arterial 33 (L) 38 - 42 mm Hg    POCT pO2, Arterial 68 (L) 85 - 95 mm Hg    POCT SO2, Arterial 95 94 - 100 %    POCT Oxy Hemoglobin, Arterial 93.2 (L) 94.0 - 98.0 %    POCT Hematocrit Calculated, Arterial 35.0 (L) 41.0 - 52.0 %    POCT Sodium, Arterial 132 (L) 136 - 145 mmol/L    POCT Potassium, Arterial 3.7 3.5 - 5.3 mmol/L    POCT Chloride, Arterial 101 98 - 107 mmol/L    POCT Ionized Calcium, Arterial 1.16 1.10 - 1.33 mmol/L    POCT Glucose, Arterial 126 (H) 74 - 99 mg/dL    POCT Lactate, Arterial 2.2 (H) 0.4 - 2.0 mmol/L    POCT Base Excess, Arterial -1.8 -2.0 - 3.0 mmol/L    POCT HCO3 Calculated, Arterial 21.9 (L) 22.0 - 26.0 mmol/L    POCT Hemoglobin, Arterial 11.6 (L) 13.5 - 17.5 g/dL    POCT Anion Gap, Arterial 13 10 - 25 mmo/L    Patient Temperature 37.0 degrees Celsius    FiO2 40 %   POCT GLUCOSE   Result Value Ref Range    POCT Glucose 149 (H) 74 - 99 mg/dL   CBC   Result Value Ref Range    WBC 11.2 4.4 - 11.3 x10*3/uL    nRBC 4.7 (H) 0.0 - 0.0 /100 WBCs    RBC 3.38 (L) 4.50 - 5.90 x10*6/uL    Hemoglobin 9.9 (L) 13.5 - 17.5 g/dL    Hematocrit 27.3 (L) 41.0 - 52.0 %    MCV 81 80 - 100 fL    MCH 29.3 26.0 - 34.0 pg    MCHC 36.3 (H) 32.0 - 36.0 g/dL    RDW 21.0 (H) 11.5 - 14.5 %    Platelets 93 (L) 150 - 450 x10*3/uL   Magnesium   Result Value Ref Range    Magnesium 2.72 (H) 1.60 - 2.40 mg/dL   Renal Function Panel   Result Value Ref Range    Glucose 136 (H) 74 - 99 mg/dL    Sodium 137 136 - 145 mmol/L    Potassium 4.3 3.5 - 5.3 mmol/L    Chloride 100 98 - 107 mmol/L    Bicarbonate 25 21 - 32 mmol/L    Anion Gap 16 10 - 20 mmol/L    Urea Nitrogen 25 (H) 6 - 23 mg/dL     Creatinine 0.84 0.50 - 1.30 mg/dL    eGFR >90 >60 mL/min/1.73m*2    Calcium 9.2 8.6 - 10.6 mg/dL    Phosphorus 2.8 2.5 - 4.9 mg/dL    Albumin 4.0 3.4 - 5.0 g/dL   C. difficile, PCR    Specimen: Stool   Result Value Ref Range    C. difficile, PCR Not Detected Not Detected   POCT GLUCOSE   Result Value Ref Range    POCT Glucose 138 (H) 74 - 99 mg/dL   POCT GLUCOSE   Result Value Ref Range    POCT Glucose 132 (H) 74 - 99 mg/dL   POCT GLUCOSE   Result Value Ref Range    POCT Glucose 142 (H) 74 - 99 mg/dL   Magnesium   Result Value Ref Range    Magnesium 2.72 (H) 1.60 - 2.40 mg/dL   Hepatic function panel   Result Value Ref Range    Albumin 3.6 3.4 - 5.0 g/dL    Bilirubin, Total 26.4 (H) 0.0 - 1.2 mg/dL    Bilirubin, Direct 17.1 (H) 0.0 - 0.3 mg/dL    Alkaline Phosphatase 159 (H) 33 - 136 U/L    ALT 15 10 - 52 U/L     (H) 9 - 39 U/L    Total Protein 5.3 (L) 6.4 - 8.2 g/dL   Coagulation Screen   Result Value Ref Range    Protime 13.7 (H) 9.8 - 12.8 seconds    INR 1.2 (H) 0.9 - 1.1    aPTT 49 (H) 27 - 38 seconds   CBC   Result Value Ref Range    WBC 14.0 (H) 4.4 - 11.3 x10*3/uL    nRBC 4.9 (H) 0.0 - 0.0 /100 WBCs    RBC 3.33 (L) 4.50 - 5.90 x10*6/uL    Hemoglobin 9.8 (L) 13.5 - 17.5 g/dL    Hematocrit 27.1 (L) 41.0 - 52.0 %    MCV 81 80 - 100 fL    MCH 29.4 26.0 - 34.0 pg    MCHC 36.2 (H) 32.0 - 36.0 g/dL    RDW 20.3 (H) 11.5 - 14.5 %    Platelets 85 (L) 150 - 450 x10*3/uL   Calcium, ionized   Result Value Ref Range    POCT Calcium, Ionized 1.16 1.1 - 1.33 mmol/L   Basic Metabolic Panel   Result Value Ref Range    Glucose 132 (H) 74 - 99 mg/dL    Sodium 136 136 - 145 mmol/L    Potassium 4.3 3.5 - 5.3 mmol/L    Chloride 100 98 - 107 mmol/L    Bicarbonate 23 21 - 32 mmol/L    Anion Gap 17 10 - 20 mmol/L    Urea Nitrogen 24 (H) 6 - 23 mg/dL    Creatinine 0.79 0.50 - 1.30 mg/dL    eGFR >90 >60 mL/min/1.73m*2    Calcium 8.7 8.6 - 10.6 mg/dL   Phosphorus   Result Value Ref Range    Phosphorus 2.3 (L) 2.5 - 4.9 mg/dL    Blood Gas Arterial Full Panel   Result Value Ref Range    POCT pH, Arterial 7.43 (H) 7.38 - 7.42 pH    POCT pCO2, Arterial 34 (L) 38 - 42 mm Hg    POCT pO2, Arterial 82 (L) 85 - 95 mm Hg    POCT SO2, Arterial 98 94 - 100 %    POCT Oxy Hemoglobin, Arterial 95.5 94.0 - 98.0 %    POCT Hematocrit Calculated, Arterial 30.0 (L) 41.0 - 52.0 %    POCT Sodium, Arterial 132 (L) 136 - 145 mmol/L    POCT Potassium, Arterial 4.2 3.5 - 5.3 mmol/L    POCT Chloride, Arterial 101 98 - 107 mmol/L    POCT Ionized Calcium, Arterial 1.19 1.10 - 1.33 mmol/L    POCT Glucose, Arterial 134 (H) 74 - 99 mg/dL    POCT Lactate, Arterial 2.3 (H) 0.4 - 2.0 mmol/L    POCT Base Excess, Arterial -1.3 -2.0 - 3.0 mmol/L    POCT HCO3 Calculated, Arterial 22.6 22.0 - 26.0 mmol/L    POCT Hemoglobin, Arterial 10.1 (L) 13.5 - 17.5 g/dL    POCT Anion Gap, Arterial 13 10 - 25 mmo/L    Patient Temperature 37.0 degrees Celsius    FiO2 40 %   Type and screen   Result Value Ref Range    ABO TYPE O     Rh TYPE POS     ANTIBODY SCREEN NEG    POCT GLUCOSE   Result Value Ref Range    POCT Glucose 149 (H) 74 - 99 mg/dL       Assessment/Plan   Principal Problem:    Soft tissue sarcoma (Multi)  Active Problems:    Acute kidney injury (nontraumatic) (CMS-HCC)    Pulmonary embolus (Multi)    Anemia    Thrombocytopenia (CMS-HCC)    Current chronic use of systemic steroids    Gastroesophageal reflux disease without esophagitis    Extraskeletal myxoid chondrosarcoma (Multi)    Cardiopulmonary arrest (Multi)    Acute respiratory failure with hypoxia (Multi)    Tracheostomy hemorrhage (Multi)    Postoperative wound breakdown, initial encounter    Postoperative wound breakdown, sequela    Hematoma    Jason Hollins is a 63 y.o. 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 Hawkins and  Dr. Smith on 3/5/24.  Post operative course was uncomplicated and patient was on RNF until 3/20 for prolonged bed rest to avoid hip flexion to maintain flap integrity.  Patient was on prophylactic lovenox while on bedrest.      3/20: Cardiopulmonary arrest s/p CPR with ROSC after TPA, overnight started on heparin, epo, increased pressor requirements, increased swelling at flap site.      3/21: Hemorrhagic shock, MTP. Firm and large right flap site. Return to OR s/p Exploration of right thigh wound, Evacuation of hematoma. Suture ligation of multiple bleeding vessel and several points in gluteal area.      4/1: s/p Trach     4/4: return OR with ENT s/p laryngoscopy, esophagoscopy and bronchoscopy, trach revision. Found extensive clot burden in esophagus and stomach as well as in the lungs. Oozing at thyroid bed cauterized. Trach exchanged to new 6.0 prox XLT elvie. OR findings: blood up glottis and from thyroid bed to airway.     4/9: Patient is medically stable for OR on 4/11/24.  He is appropriately n.p.o. since midnight and has had more DVT prophylaxis held for OR today.     4/18: Patient bleeding into RLE given increased ANDERSON drain output and non-incrementing Hgb despite transfusion. Return to OR for washout.     4/21: - patient more somnolent, increasing WBC, Temp 99.7, Lactate 2.9 this afternoon, and increasing pressor requirements: sending blood culture x 2, resumed vancomycin, reengaged ID, and returned hydrocortisone dosing to q6hr, giving unit PRBC    Plan:  NEURO: History of myxoid chondrosarcoma s/p wide resection sarcoma, sciatic nerve neurolysis of right lower extremity with right gluteal reconstruction, pedicle ALT, vastus lateralus flap, pedicle gluteal and perisformis flap with Dr. Hawkins and Dr. Smith on 3/5/24 s/p cardiopulmonary arrest with ROSC 3/20. CT head 3/22 without acute process. Weaned off cisatracurium and propofol overnight 3/23-3/24. Ketamine weaned off 3/25 and Fentanyl weaned off  3/26 am. Repeat CT Head 3/26 without acute process. MRI Brain 3/30, negative for cerebral infarction or hemorrhage. Neuro exam - following commands except for RLE, tracking, nodding/shaking head to questions.  CT head on 4/22 that was unremarkable.  - ongoing neuro and pain assessments  -Continue thiamine 500 mg TID   - PRN dilaudid for pain control   - PT/OT -> AROM     CV: History of HTN, HLD. Acute cardiopulmonary arrest 2/2 to possible massive PE vs massive STEMI, received multiple rounds of CPR and one defibrillation.  Sustained ROSC achieved after TPA bolus. On high dose levophed, epinephrine, vaso, angioT2. Plan on 3/21 was for ECMO which was aborted secondary to large hemhorrge at right flap site. Had MTP and taken OR with 5L evacuated. ECHO 3/21: Normal LV, Mod enlarged RV, slight suggestion of a Townsend's sign / RV strain, low normal RV function. ECHO 3/22 with hyperdynamic LV. New onset Afib with RVR overnight 3/22-3/23, dosed with 150mg amio x2, started infusion at 1mg/min thereafter. AT2 weaned off. Amio infusion discontinued 3/25. Lactate downtrending. Vaso and epi weaned off. Remains in NSR. iEpo weaned off 3/27. Repeat TTE 3/29 and 4/2 essentially unchanged. Levo resumed 4/2 evening to continue aggressive fluid removal. Repeat TTE on 4/10 with LVEF 65-70% and RV difficulty to assess. Weaned off levo 4/13. Marginal hypotension 4/20 . Overnight his blood pressure was soft and norepinephrine drip increased to 0.1 and was given 100 cc of 25% albumin.  Fluid removal from CVVH decreased to 20 mL/h. Currently on  norepi 0.09 and 0.03 of vasopressin.    - continuous EKG/abp monitoring  - Goal map range 65-90  -Will schedule for doses of 100 cc 25% albumin.  - continue levo and vasopressin infusion to support BP while removing fluid with CVVH, wean off as tolerated  - continue midodrine 20mg q8h   - Continue florinef 0.1mg BID   - continue hydrocortisone 50 mg q6hr       PULM: Arrived to SICU intubated  julio c-CPR. Concern for massive PE. Started on iEpo, currently on 0.05. Hypoxic respiratory failure. Severe ARDS. ETT exchanged 3/23. CT Chest 3/26 with interval JOHN consolidative/ground glass opacity. S/p Tracheostomy on 4/1. S/p trach revision on 4/4. CXR with bilateraly pulmonary edema and pleural effusions R>L.  Currently on SIMV and 40% of FiO2.  - wean vent as tolerated, maintain SpO2 >90%, PaO2 >60  -CPAP trial today  - additional bronchial hygiene as indicated  - Daily CXR  - ABG prn     ENT: Had epistaxis 3/23, completed afrin bid x3 days. S/p trach on 4/1. Bleeding from trach site 4/2 am, packing placed by ENT. Repeat episode of bloody tracheal secretions 4/3 through this am. Taken back to OR with ENT 4/4 s/p trach revision. Noted to have greenish discharge around his tracheostomy on 4/22 and ENT saw the patient.   - Culture ordered for drainage around of the trach>> grew mixed gram positive bacteria and Klebsiella.  - ENT following  - wound care following and saw the patient today.      GI: NPO. Shock liver, pancreatitis. Elevated TG. Elevated lactate. Consulted ACS for potential bowel ischemia as cause of persistent lactic acidosis. CT imaging 3/22 with evidence of pancreatitis. No acute surgical indication per ACS. RUQ US 3/22 with thickening of GB wall without cholelithiasis. CT A/P 3/26 negative for acute bleed. LFTs downtrending. Worsening hyperbilirubinemia. Amylase and lipase now WNL. Repeat RUQ US 4/1 with nonspecific gallbladder wall edema and thickening which may be 2/2 generalized fluid overload, mild hepatomegaly with slight nodular contour and c/f steatosis and fibrosis, irregular hypoechoic region adjacent to hepatic veins. US liver with doppler 4/2 revealed hepatomegaly with nodular contour, mildly elevated RI in main and left hepatic arteries. Hyperbilirubinemia.KUB xray didn't show any signs of bowel obstructions or perforations.  Had 3 BM ON (was reddish, brownish and mucusy in nature).    - Continue TFs to goal 45ml/hr, prostat daily (currently on 40cc, will increase 10cc qhr till goal)   - c-diff PCR   - PPI daily for GI prophylaxis  - Trend LFTs daily  - continue to stop bowel regimen given his current diarrhea        : No history of renal disease. Baseline creatinine 0.6. Anuric RUTH. Elevated CK, downtrending. Metabolic acidosis, total body fluid overload, hyperkalemia. Started on CVVH 3/21, stopped bicarb infusion 3/22. Marino removed 3/24. Hyponatremia resolved. Stopped CVVH 4/2.  Has been tolerating iHD. Net +ve 1467. CVVH restarted on 4/18 given hemorrhage and takeback to OR. Now on CVVH and today we stopped pulling out fluid given his BP status. Net negative 2.6 L for past 24hrs.  - Nephrology following, appreciate recs  - continue CVVH and goal to be net even for I/Os while keeping the MAP above 65   - RFP BID and PRN  - Replete electrolytes judiciously  - Replete electrolyte per SICU protocol      HEME: Patient was on ppx lovenox for DVT prophylaxis prior to cardiopulmonary arrest. Concern for massive PE patient received bolus of TPA during CPR. Started on heparin infusion overnight given concern for PE. Hemorrhagic shock 3/21, MTP and back to OR s/p Exploration of right thigh woundEvacuation of hematoma. Suture ligation of multiple bleeding vessel and several points in gluteal area. Hematology consulted 3/22 to r/o HLH. Transfused 1 RBC overnight 3/22-3/23. Thrombocytopenia, coagulapathy, hypofibrinogenemia. LE Duplex 3/25 +acute occlusive R soleal DVT. Received 2u PRBC and 1u FFP on 3/26. Heparin infusion discontinued 3/26 to r/o HIT and transitioned to bival. PF4 negative, resumed heparin infuision 3/27. Elevated IL2R and decreased NK function. C/f HLH (low suspicion), empirically treated with decadron (3/28-3/31). Thrombocytopenia on 3/30 to 18k, received 5pk, did not increment. Heparin held. Thrombocytopenia likely 2/2 to consumptive process, hematology following. Received IVIG and  5pk plts 3/31. Pancytopenia persists, improving thrombocytopenia. On 4/18 increased bleeding noted into RLE. Blood transfusion ongoing and Hep gtt held. On 4/22  DVT study of UE and LE that showed acute occlusive deep vein thrombosis visualized in the right soleal vein and acute occlusive superficial venous thrombosis visualized in the mid right cephalic vein.  He is on prophylactic subQ heparin and IVC filter was placed on 4/23. today's INR is 1.2  - CBC and coags BID and PRN  - continue Sub q heparin 5000 units q8hr, plastic is ok with that  - No full therapeutic anticoagulation at this time  - Notify Plastics if ANDERSON drain output exceeds >300 ml/hr  - close surveillance of RLE and drains  - maintain active T&S        ENDO: History of DM2. A1c 7.5%. TSH WNL 1/2024. Previously hyperglycemic and Now his feeding tube is resumed and will monitor his blood glucose closely.   - continue Lantus 12U BID  and on SSI to #4   - q4h accuchecks    ID: On broad antimicrobial therapy. MRSA screen + 2/28/24. Pan cultures sent 3/22. Sputum NTF, +MRSA screen (completed 5 day course mupirocin), UCx and BCx negative. Completed 7 day course vanc/zosyn 3/27. Febrile to 39.1 4/3, resent blood cultures and BAL and started vanco and zosyn. Switched zosyn to reynaldo, kept on vanco, added john. Blood cultures and sputum with Klebsiella. Repeat blood cultures sent 4/4 (neg final). Wound culture 4/11 +MDRO Klebsiella aerogenes (S meropenem). Urine culture 4/12 negative. MRSA negative 4/18. Started on Vancomycin and Micafungin on 4/21. Blood cultures NGTD and also Culture from his trach drainage grew mixed gram positive bacteria and klebsiella.  Had an elevated WBC 14 today we will continue to trend it but probably it is stress related given his increase vasopressor requirement.   - ID following, appreciate recs  - temp q4h, wbc daily  - continue meropenem (stop date 5/2/24 per ID)  - wound care saw the patient today, appreciate recs  - Vancomycin   stopped 4/21-4/23 per ID recs  - Micafungin stopped (started on 4/21 and stopped on 4/24)  - ongoing monitoring for s/s infection         Lines:  - LIJ trialysis (4/18, SICU)  - left radial arterial line (4/5, SICU)  - PIV x2       Dispo: Continue ICU care. Patient seen and discussed with ICU attending Dr. Moreno.       Joyce Sy MD   SICU phone [phone]

## 2024-04-25 NOTE — NURSING NOTE
0700: Bedside RN report received    0715: Spoke with MD Joyce Sy, per MD MAP > 65 off a line and increase tube feed to goal of 45.    0800, 1200, 1600: Hand hygiene performed    1800: While changing a bowel movement, RN made Paige LEWIS with SICU aware of the pt bowel movement looking more red tinged than normal. Labs sent.    1900: Bedside RN report given

## 2024-04-25 NOTE — PROGRESS NOTES
"    Department of Plastic and Reconstructive Surgery  Daily Progress Note    Patient Name: Jason Hollins  MRN: [id number]  Date:  04/25/24     Subjective  Found resting in bed, remains critically ill in TSICU. Patient alert and responsive, appropriately gives thumbs up when asked questions. Incisional wound vac in place to right hip without issue overnight. ANDERSON drain outputs remain appropriate, output is dark, bloody serous drainage. Remains trach to vent with vasopressor support, CVV dialysis.     Overnight Events  NAOE    Objective    Vital Signs  /64   Pulse 106   Temp 36.7 °C (98.1 °F) (Temporal)   Resp 14   Ht 1.778 m (5' 10\")   Wt 120 kg (264 lb 8.8 oz)   SpO2 96%   BMI 37.96 kg/m²      Physical Exam   Constitutional: Appears critically ill. Lying in bed in surgical ICU, very responsive to verbal stimuli today. Nods/shakes head and thumbs up to questions. Able to track.  ENMT: MMM, dobhoff in R nare.  Cardiovascular: RRR on telemetry monitor.  Respiratory/Thorax: Trach to vent.  Gastrointestinal: Abdomen soft, protuberant.   Musculoskeletal: Able to squeeze bilateral hands, wiggles toes on the left, minimal movement on RLE, effort to move foot observed.   Extremities: Generalized pitting edema. Right thigh and knee edematous. Visible portion of flap recipient site and adjacent anterior R thigh incisions appear stable. Posterior flap incision line and posterior thigh/gluteal region incision dressed with incisional wound vac, maintaining low continuous suction at -50mmHg, no alarms for leak, obstruction or malfunction, no output in collecting canister. ANDERSON drain x 3 right upper leg with appropriate dark red, bloody drainage Neurological: Off sedation, able to respond to commands with head nod and thumbs up  Skin: Edematous, jaundiced, warm.      Diagnostics   Results for orders placed or performed during the hospital encounter of 03/05/24 (from the past 24 hour(s))   POCT GLUCOSE   Result Value Ref Range    " POCT Glucose 138 (H) 74 - 99 mg/dL   POCT GLUCOSE   Result Value Ref Range    POCT Glucose 132 (H) 74 - 99 mg/dL   POCT GLUCOSE   Result Value Ref Range    POCT Glucose 142 (H) 74 - 99 mg/dL   Magnesium   Result Value Ref Range    Magnesium 2.72 (H) 1.60 - 2.40 mg/dL   Hepatic function panel   Result Value Ref Range    Albumin 3.6 3.4 - 5.0 g/dL    Bilirubin, Total 26.4 (H) 0.0 - 1.2 mg/dL    Bilirubin, Direct 17.1 (H) 0.0 - 0.3 mg/dL    Alkaline Phosphatase 159 (H) 33 - 136 U/L    ALT 15 10 - 52 U/L     (H) 9 - 39 U/L    Total Protein 5.3 (L) 6.4 - 8.2 g/dL   Coagulation Screen   Result Value Ref Range    Protime 13.7 (H) 9.8 - 12.8 seconds    INR 1.2 (H) 0.9 - 1.1    aPTT 49 (H) 27 - 38 seconds   CBC   Result Value Ref Range    WBC 14.0 (H) 4.4 - 11.3 x10*3/uL    nRBC 4.9 (H) 0.0 - 0.0 /100 WBCs    RBC 3.33 (L) 4.50 - 5.90 x10*6/uL    Hemoglobin 9.8 (L) 13.5 - 17.5 g/dL    Hematocrit 27.1 (L) 41.0 - 52.0 %    MCV 81 80 - 100 fL    MCH 29.4 26.0 - 34.0 pg    MCHC 36.2 (H) 32.0 - 36.0 g/dL    RDW 20.3 (H) 11.5 - 14.5 %    Platelets 85 (L) 150 - 450 x10*3/uL   Calcium, ionized   Result Value Ref Range    POCT Calcium, Ionized 1.16 1.1 - 1.33 mmol/L   Basic Metabolic Panel   Result Value Ref Range    Glucose 132 (H) 74 - 99 mg/dL    Sodium 136 136 - 145 mmol/L    Potassium 4.3 3.5 - 5.3 mmol/L    Chloride 100 98 - 107 mmol/L    Bicarbonate 23 21 - 32 mmol/L    Anion Gap 17 10 - 20 mmol/L    Urea Nitrogen 24 (H) 6 - 23 mg/dL    Creatinine 0.79 0.50 - 1.30 mg/dL    eGFR >90 >60 mL/min/1.73m*2    Calcium 8.7 8.6 - 10.6 mg/dL   Phosphorus   Result Value Ref Range    Phosphorus 2.3 (L) 2.5 - 4.9 mg/dL   Blood Gas Arterial Full Panel   Result Value Ref Range    POCT pH, Arterial 7.43 (H) 7.38 - 7.42 pH    POCT pCO2, Arterial 34 (L) 38 - 42 mm Hg    POCT pO2, Arterial 82 (L) 85 - 95 mm Hg    POCT SO2, Arterial 98 94 - 100 %    POCT Oxy Hemoglobin, Arterial 95.5 94.0 - 98.0 %    POCT Hematocrit Calculated, Arterial  30.0 (L) 41.0 - 52.0 %    POCT Sodium, Arterial 132 (L) 136 - 145 mmol/L    POCT Potassium, Arterial 4.2 3.5 - 5.3 mmol/L    POCT Chloride, Arterial 101 98 - 107 mmol/L    POCT Ionized Calcium, Arterial 1.19 1.10 - 1.33 mmol/L    POCT Glucose, Arterial 134 (H) 74 - 99 mg/dL    POCT Lactate, Arterial 2.3 (H) 0.4 - 2.0 mmol/L    POCT Base Excess, Arterial -1.3 -2.0 - 3.0 mmol/L    POCT HCO3 Calculated, Arterial 22.6 22.0 - 26.0 mmol/L    POCT Hemoglobin, Arterial 10.1 (L) 13.5 - 17.5 g/dL    POCT Anion Gap, Arterial 13 10 - 25 mmo/L    Patient Temperature 37.0 degrees Celsius    FiO2 40 %   Type and screen   Result Value Ref Range    ABO TYPE O     Rh TYPE POS     ANTIBODY SCREEN NEG    POCT GLUCOSE   Result Value Ref Range    POCT Glucose 149 (H) 74 - 99 mg/dL   POCT GLUCOSE   Result Value Ref Range    POCT Glucose 148 (H) 74 - 99 mg/dL   Calcium, ionized   Result Value Ref Range    POCT Calcium, Ionized 1.13 1.1 - 1.33 mmol/L   Magnesium   Result Value Ref Range    Magnesium 2.66 (H) 1.60 - 2.40 mg/dL   Hepatic function panel   Result Value Ref Range    Albumin 3.5 3.4 - 5.0 g/dL    Bilirubin, Total 25.8 (H) 0.0 - 1.2 mg/dL    Bilirubin, Direct 17.4 (H) 0.0 - 0.3 mg/dL    Alkaline Phosphatase 161 (H) 33 - 136 U/L    ALT 16 10 - 52 U/L     (H) 9 - 39 U/L    Total Protein 5.0 (L) 6.4 - 8.2 g/dL   Coagulation Screen   Result Value Ref Range    Protime 14.1 (H) 9.8 - 12.8 seconds    INR 1.3 (H) 0.9 - 1.1    aPTT 55 (H) 27 - 38 seconds   CBC   Result Value Ref Range    WBC 14.6 (H) 4.4 - 11.3 x10*3/uL    nRBC 5.7 (H) 0.0 - 0.0 /100 WBCs    RBC 3.21 (L) 4.50 - 5.90 x10*6/uL    Hemoglobin 9.4 (L) 13.5 - 17.5 g/dL    Hematocrit 26.2 (L) 41.0 - 52.0 %    MCV 82 80 - 100 fL    MCH 29.3 26.0 - 34.0 pg    MCHC 35.9 32.0 - 36.0 g/dL    RDW 20.7 (H) 11.5 - 14.5 %    Platelets 76 (L) 150 - 450 x10*3/uL   Basic Metabolic Panel   Result Value Ref Range    Glucose 142 (H) 74 - 99 mg/dL    Sodium 137 136 - 145 mmol/L     Potassium 4.2 3.5 - 5.3 mmol/L    Chloride 102 98 - 107 mmol/L    Bicarbonate 22 21 - 32 mmol/L    Anion Gap 17 10 - 20 mmol/L    Urea Nitrogen 22 6 - 23 mg/dL    Creatinine 0.72 0.50 - 1.30 mg/dL    eGFR >90 >60 mL/min/1.73m*2    Calcium 8.6 8.6 - 10.6 mg/dL   Phosphorus   Result Value Ref Range    Phosphorus 2.6 2.5 - 4.9 mg/dL   POCT GLUCOSE   Result Value Ref Range    POCT Glucose 145 (H) 74 - 99 mg/dL   Magnesium   Result Value Ref Range    Magnesium 2.75 (H) 1.60 - 2.40 mg/dL   Hepatic function panel   Result Value Ref Range    Albumin 3.7 3.4 - 5.0 g/dL    Bilirubin, Total 26.6 (H) 0.0 - 1.2 mg/dL    Bilirubin, Direct      Alkaline Phosphatase 164 (H) 33 - 136 U/L    ALT 17 10 - 52 U/L     (H) 9 - 39 U/L    Total Protein 5.3 (L) 6.4 - 8.2 g/dL   Coagulation Screen   Result Value Ref Range    Protime 14.3 (H) 9.8 - 12.8 seconds    INR 1.3 (H) 0.9 - 1.1    aPTT 55 (H) 27 - 38 seconds   CBC   Result Value Ref Range    WBC 14.8 (H) 4.4 - 11.3 x10*3/uL    nRBC 4.7 (H) 0.0 - 0.0 /100 WBCs    RBC 3.24 (L) 4.50 - 5.90 x10*6/uL    Hemoglobin 9.5 (L) 13.5 - 17.5 g/dL    Hematocrit 26.5 (L) 41.0 - 52.0 %    MCV 82 80 - 100 fL    MCH 29.3 26.0 - 34.0 pg    MCHC 35.8 32.0 - 36.0 g/dL    RDW 20.9 (H) 11.5 - 14.5 %    Platelets 74 (L) 150 - 450 x10*3/uL   Calcium, ionized   Result Value Ref Range    POCT Calcium, Ionized 1.16 1.1 - 1.33 mmol/L   Basic Metabolic Panel   Result Value Ref Range    Glucose 144 (H) 74 - 99 mg/dL    Sodium 135 (L) 136 - 145 mmol/L    Potassium 4.2 3.5 - 5.3 mmol/L    Chloride 99 98 - 107 mmol/L    Bicarbonate 22 21 - 32 mmol/L    Anion Gap 18 10 - 20 mmol/L    Urea Nitrogen 23 6 - 23 mg/dL    Creatinine 0.81 0.50 - 1.30 mg/dL    eGFR >90 >60 mL/min/1.73m*2    Calcium 8.6 8.6 - 10.6 mg/dL   Phosphorus   Result Value Ref Range    Phosphorus 2.3 (L) 2.5 - 4.9 mg/dL     Current Medications  Scheduled medications  albumin human, 25 g, intravenous, q6h  fludrocortisone, 0.1 mg, nasogastric tube,  q12h  heparin (porcine), 5,000 Units, subcutaneous, q8h  hydrocortisone sodium succinate, 50 mg, intravenous, q6h  insulin glargine, 12 Units, subcutaneous, q12h  insulin lispro, 0-20 Units, subcutaneous, q4h  meropenem, 1 g, intravenous, q12h  midodrine, 20 mg, orogastric tube, q8h  oxygen, , inhalation, Continuous - Inhalation  pantoprazole, 40 mg, intravenous, BID  thiamine, 500 mg, nasogastric tube, q8h      Continuous medications  lactated Ringer's, 5 mL/hr, Last Rate: 5 mL/hr (04/24/24 1600)  norepinephrine, 0.01-1 mcg/kg/min (Dosing Weight), Last Rate: 0.13 mcg/kg/min (04/25/24 1300)  PrismaSol 4/2.5, 3,000 mL/hr, Last Rate: 3,000 mL/hr (04/24/24 1544)  vasopressin, 0.01-0.03 Units/min, Last Rate: 0.03 Units/min (04/25/24 1300)      PRN medications  PRN medications: acetaminophen, alteplase, alteplase, calcium gluconate, calcium gluconate, dextrose, glucagon, heparin, heparin, HYDROmorphone, HYDROmorphone, labetaloL, magnesium sulfate, magnesium sulfate, midodrine, sodium chloride     Assessment  Jason Gunjan 63 y.o. 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 Hawkins and Dr. Smith on 3/5/24. Postoperative course c/b arrest requiring CPR with ROSC after TPA for assumed large PE on 3/20. Increased swelling at flap site appreciated overnight 3/20-3/21 and pt returned to OR for exploration of R flap site, evacuation of hematoma, suture ligation of multiple bleeding vessel sites in gluteal area and wound closure with Dr. Smith on 3/21. Pt has remained in ICU for continued critical care evaluation and management postoperatively. Returned to OR 4/11 with plastic surgery for R posterior thigh/gluteal region I&D with tissue advancement/complex closure and application of incisional wound vac. Overnight on 4/18, pt with  interval c/f bleeding recurrence at R thigh/gluteal region surgical site. S/P I&D of right hip with evacuation of hematoma, closure and incisional wound vac replacement with Dr. Smith on evening of 4/18.     Plan/Recommendations   -Appreciate ICU further evaluation into clinical status change on 4/21 (marginal temperature, rise in WBC count, lactic and pressor requirement with somnolence on exam)       ·  Cultured drainage from trach site-  gram positive and negative bacteria and klebsiella        ·  White count elevated to 14.8 this AM- Per ICU stress related given his increase vasopressor requirement.        ·  Blood cultures NGTD       ·  Pt remaining afebrile   - 4/22 Vascular US notable for superficial thromboses of mid cephalic vein and soleal vein       ·  Plastics recommend careful anticoagulation due to recent history of rebleeding       ·  Heparin 5000 Q8 with close monitoring of flap site and drain output        ·  INR 1.2 and PT 13.8 early this AM, continue to trend       ·  IVC filter placed 4/23  - Maintain incisional wound vac to R posterior thigh wound site per plastic surgery x14 (5/2) days post-op         ·  Settings: -50 mmHg low continuous suction        ·  Monitor/record vac canister output [id number]       ·  Notify plastic surgery with any concerns of leak or obstruction  - Continue ANDERSON drain care to x3 drains present at R thigh flap reconstruction site      ·  Strip drain tubing TID and PRN     ·  Monitor and record output q8h      ·  Keep drain sites C/D/I      ·  Notify plastics if ANDERSON drain output exceeds > 300 ml/hr (continuing to monitor closely)   - Avoid pressure application or positioning onto flap site or incisions at R upper leg   - Recommend patient be positioned off of R side as able to prevent flap compression/wound breakdown   - Continue clinitron fluidized air mattress  - Plastic Surgery will continue to follow      Patient and plan discussed with Dr. Smith.     Hany  PHILLIP Gupta-CNP   Plastic and Reconstructive Surgery   [place]  Pager #[phone]  Team phone: [phone]

## 2024-04-25 NOTE — PROGRESS NOTES
"Nutrition Follow Up Assessment  Nutrition Assessment    Remains on vent via trach. Tube feeds held on 4/18, resumed 4/19 and back to goal 4/20. Enteral feeds stopped again on 4/23 due to emesis, resumed and are back at goal rate. Back on CVVH and pressors.    Anthropometrics:  Height: 177.8 cm (5' 10\")   Weight: 120 kg (264 lb 8.8 oz)   BMI (Calculated): 39.51  IBW/kg (Dietitian Calculated): 75.5 kg  Percent of IBW: 165 %     Admission Weight Trend: wt fluctuations through out admission 2/2 fluid shifts    Nutrition Focused Physical Exam Findings:    Edema:  Edema: +3 moderate  Edema Location: generalized  Physical Findings:  Skin:  (wound to trach site, surgical wounds)    Nutrition Significant Labs:  BG POCT trend:   Results from last 7 days   Lab Units 04/25/24  1200 04/25/24  0804 04/25/24  0352 04/24/24  2313 04/24/24 2009   POCT GLUCOSE mg/dL 145* 148* 149* 142* 132*    , Renal Lab Trend:   Results from last 7 days   Lab Units 04/25/24  1248 04/25/24  0910 04/25/24  0046 04/24/24  1218   POTASSIUM mmol/L 4.2 4.2 4.3 4.3   PHOSPHORUS mg/dL 2.3* 2.6 2.3* 2.8   SODIUM mmol/L 135* 137 136 137   MAGNESIUM mg/dL 2.75* 2.66* 2.72* 2.72*   EGFR mL/min/1.73m*2 >90 >90 >90 >90   BUN mg/dL 23 22 24* 25*   CREATININE mg/dL 0.81 0.72 0.79 0.84        Nutrition Specific Medications:  Scheduled medications  hydrocortisone sodium succinate, 50 mg, intravenous, q6h  insulin glargine, 12 Units, subcutaneous, q12h  insulin lispro, 0-20 Units, subcutaneous, q4h  pantoprazole, 40 mg, intravenous, BID  thiamine, 500 mg, nasogastric tube, q8h    Continuous medications  lactated Ringer's, 5 mL/hr, Last Rate: 5 mL/hr (04/24/24 1600)  norepinephrine, 0.01-1 mcg/kg/min (Dosing Weight), Last Rate: 0.1 mcg/kg/min (04/25/24 1500)  PrismaSol 4/2.5, 3,000 mL/hr, Last Rate: 3,000 mL/hr (04/24/24 1544)  vasopressin, 0.01-0.03 Units/min, Last Rate: 0.03 Units/min (04/25/24 1500)      PRN medications  PRN medications: acetaminophen, alteplase, " alteplase, calcium gluconate, calcium gluconate, dextrose, glucagon, heparin, heparin, HYDROmorphone, HYDROmorphone, labetaloL, magnesium sulfate, magnesium sulfate, midodrine, sodium chloride      I/O:   Last BM Date: 04/25/24; Stool Appearance: Loose, Watery, Mucous (04/25/24 1100)    Dietary Orders (From admission, onward)       Start     Ordered    04/20/24 0753  Enteral feeding with NPO Pivot 1.5; NG (nasogastric tube); 45  Diet effective now        Comments: Advance by 10mL every 12 hours until reaching goal of 45mL/hr   Question Answer Comment   Tube feeding formula: Pivot 1.5    Tube feeding additive: Pro-Stat AWC    Tube feeding additive frequency: Once a day    Feeding route: NG (nasogastric tube)    Tube feeding continuous rate (mL/hr): 45        04/20/24 0752                     Estimated Needs:   Total Energy Estimated Needs (kCal): 2000 kCal  Method for Estimating Needs: recalculated now that pt is on day 51 of stay, MSJ = 2010  Total Protein Estimated Needs (g): 115 g  Method for Estimating Needs: 1.5+ g/kg IBW  Total Fluid Estimated Needs (mL):  (per SICU)      Nutrition Diagnosis   Malnutrition Diagnosis  Patient has Malnutrition Diagnosis: Yes  Diagnosis Status: Ongoing  Malnutrition Diagnosis: Moderate malnutrition related to acute disease or injury  As Evidenced by: <50% of estimated energy requirements for >5days (no intake x 6 days), +2 edema, current critically ill state.  Additional Assessment Information: Pt's feeds have not been resumed since 4/11.      Nutrition Interventions/Recommendations         Nutrition Prescription:  Enteral nutrition support         Nutrition Interventions:   Increase Pivot 1.5 to 55 ml/hr and can discontinue prostat, will provide 1980 kcal 123 g protein, 990 ml free H20  Free H20 flush per team   Pivot 1.5 remains appropriate due to pt requiring pressors, having wounds, and having increased protein losses through CVVH       Nutrition Monitoring and Evaluation    Food/Nutrient Related History Monitoring  Monitoring and Evaluation Plan: Enteral and parenteral nutrition intake  Enteral and Parenteral Nutrition Intake: Enteral nutrition intake  Criteria: pt will receive >/= 80% presecribed enteral feeds      Biochemical Data, Medical Tests and Procedures  Monitoring and Evaluation Plan: Electrolyte/renal panel, Glucose/endocrine profile  Criteria: WNL  Criteria: blood glucose 140-180 mg/dl    Time Spent/Follow-up Reminder:   Time Spent (min): 45 minutes  Last Date of Nutrition Visit: 04/25/24  Nutrition Follow-Up Needed?: Dietitian to reassess per policy  Follow up Comment: TF, trach + vent

## 2024-04-25 NOTE — CONSULTS
Wound Care Consult     Visit Date: 4/25/2024      Patient Name: Jason Hollins         MRN: [id number]           YOB: 1960     Reason for Consult: asked by team to reassess trach site        Wound Team Summary Assessment:   Trach site reassessed today; trach ties removed to visualize neck skin. No wounds or skin breakdown noted at this time.   Area cleansed with bath wipes and dried with gauze. Cavilon applied to entire area under trach and mepilex lite; new trach ties applied.       Tierra Rios RN  4/25/2024  1:48 PM

## 2024-04-25 NOTE — PROCEDURES
CVVH note    Seen and examined on CVVH. Labs and events reviewed   Vascular access: [facility] nonTDC     BFR :200 ml/min  Filter: M150  Replacement solution: Prismasol 4kK/2.5Ca  Replacement Rate: 3000 ml/hour  1000/pump/1000/filter/1000    Recommendations/plan:  No change in therapy for next 24 h.   Fluid removal: per ICU team     Intake/Output Summary (Last 24 hours) at 4/25/2024 1601  Last data filed at 4/25/2024 1500  Gross per 24 hour   Intake 2587.53 ml   Output 1594 ml   Net 993.53 ml     Heart Rate:  []   Temp:  [35.2 °C (95.4 °F)-36.8 °C (98.2 °F)]   Resp:  [0-35]   BP: ()/()   Weight:  [120 kg (264 lb 8.8 oz)]   SpO2:  [83 %-100 %]     Scheduled medications  albumin human, 25 g, intravenous, q6h  fludrocortisone, 0.1 mg, nasogastric tube, q12h  heparin (porcine), 5,000 Units, subcutaneous, q8h  hydrocortisone sodium succinate, 50 mg, intravenous, q6h  insulin glargine, 12 Units, subcutaneous, q12h  insulin lispro, 0-20 Units, subcutaneous, q4h  meropenem, 1 g, intravenous, q12h  midodrine, 20 mg, orogastric tube, q8h  oxygen, , inhalation, Continuous - Inhalation  pantoprazole, 40 mg, intravenous, BID  sodium phosphate, 15 mmol, intravenous, Once  thiamine, 500 mg, nasogastric tube, q8h      Continuous medications  lactated Ringer's, 5 mL/hr, Last Rate: 5 mL/hr (04/24/24 1600)  norepinephrine, 0.01-1 mcg/kg/min (Dosing Weight), Last Rate: 0.1 mcg/kg/min (04/25/24 1500)  PrismaSol 4/2.5, 3,000 mL/hr, Last Rate: 3,000 mL/hr (04/24/24 1544)  vasopressin, 0.01-0.03 Units/min, Last Rate: 0.03 Units/min (04/25/24 1500)      PRN medications  PRN medications: acetaminophen, alteplase, alteplase, calcium gluconate, calcium gluconate, dextrose, glucagon, heparin, heparin, HYDROmorphone, HYDROmorphone, labetaloL, magnesium sulfate, magnesium sulfate, midodrine, sodium chloride      Recent Results (from the past 24 hour(s))   POCT GLUCOSE    Collection Time: 04/24/24  8:09 PM   Result Value Ref Range     POCT Glucose 132 (H) 74 - 99 mg/dL   POCT GLUCOSE    Collection Time: 04/24/24 11:13 PM   Result Value Ref Range    POCT Glucose 142 (H) 74 - 99 mg/dL   Magnesium    Collection Time: 04/25/24 12:46 AM   Result Value Ref Range    Magnesium 2.72 (H) 1.60 - 2.40 mg/dL   Hepatic function panel    Collection Time: 04/25/24 12:46 AM   Result Value Ref Range    Albumin 3.6 3.4 - 5.0 g/dL    Bilirubin, Total 26.4 (H) 0.0 - 1.2 mg/dL    Bilirubin, Direct 17.1 (H) 0.0 - 0.3 mg/dL    Alkaline Phosphatase 159 (H) 33 - 136 U/L    ALT 15 10 - 52 U/L     (H) 9 - 39 U/L    Total Protein 5.3 (L) 6.4 - 8.2 g/dL   Coagulation Screen    Collection Time: 04/25/24 12:46 AM   Result Value Ref Range    Protime 13.7 (H) 9.8 - 12.8 seconds    INR 1.2 (H) 0.9 - 1.1    aPTT 49 (H) 27 - 38 seconds   CBC    Collection Time: 04/25/24 12:46 AM   Result Value Ref Range    WBC 14.0 (H) 4.4 - 11.3 x10*3/uL    nRBC 4.9 (H) 0.0 - 0.0 /100 WBCs    RBC 3.33 (L) 4.50 - 5.90 x10*6/uL    Hemoglobin 9.8 (L) 13.5 - 17.5 g/dL    Hematocrit 27.1 (L) 41.0 - 52.0 %    MCV 81 80 - 100 fL    MCH 29.4 26.0 - 34.0 pg    MCHC 36.2 (H) 32.0 - 36.0 g/dL    RDW 20.3 (H) 11.5 - 14.5 %    Platelets 85 (L) 150 - 450 x10*3/uL   Calcium, ionized    Collection Time: 04/25/24 12:46 AM   Result Value Ref Range    POCT Calcium, Ionized 1.16 1.1 - 1.33 mmol/L   Basic Metabolic Panel    Collection Time: 04/25/24 12:46 AM   Result Value Ref Range    Glucose 132 (H) 74 - 99 mg/dL    Sodium 136 136 - 145 mmol/L    Potassium 4.3 3.5 - 5.3 mmol/L    Chloride 100 98 - 107 mmol/L    Bicarbonate 23 21 - 32 mmol/L    Anion Gap 17 10 - 20 mmol/L    Urea Nitrogen 24 (H) 6 - 23 mg/dL    Creatinine 0.79 0.50 - 1.30 mg/dL    eGFR >90 >60 mL/min/1.73m*2    Calcium 8.7 8.6 - 10.6 mg/dL   Phosphorus    Collection Time: 04/25/24 12:46 AM   Result Value Ref Range    Phosphorus 2.3 (L) 2.5 - 4.9 mg/dL   Blood Gas Arterial Full Panel    Collection Time: 04/25/24 12:47 AM   Result Value Ref Range     POCT pH, Arterial 7.43 (H) 7.38 - 7.42 pH    POCT pCO2, Arterial 34 (L) 38 - 42 mm Hg    POCT pO2, Arterial 82 (L) 85 - 95 mm Hg    POCT SO2, Arterial 98 94 - 100 %    POCT Oxy Hemoglobin, Arterial 95.5 94.0 - 98.0 %    POCT Hematocrit Calculated, Arterial 30.0 (L) 41.0 - 52.0 %    POCT Sodium, Arterial 132 (L) 136 - 145 mmol/L    POCT Potassium, Arterial 4.2 3.5 - 5.3 mmol/L    POCT Chloride, Arterial 101 98 - 107 mmol/L    POCT Ionized Calcium, Arterial 1.19 1.10 - 1.33 mmol/L    POCT Glucose, Arterial 134 (H) 74 - 99 mg/dL    POCT Lactate, Arterial 2.3 (H) 0.4 - 2.0 mmol/L    POCT Base Excess, Arterial -1.3 -2.0 - 3.0 mmol/L    POCT HCO3 Calculated, Arterial 22.6 22.0 - 26.0 mmol/L    POCT Hemoglobin, Arterial 10.1 (L) 13.5 - 17.5 g/dL    POCT Anion Gap, Arterial 13 10 - 25 mmo/L    Patient Temperature 37.0 degrees Celsius    FiO2 40 %   Type and screen    Collection Time: 04/25/24 12:50 AM   Result Value Ref Range    ABO TYPE O     Rh TYPE POS     ANTIBODY SCREEN NEG    POCT GLUCOSE    Collection Time: 04/25/24  3:52 AM   Result Value Ref Range    POCT Glucose 149 (H) 74 - 99 mg/dL   POCT GLUCOSE    Collection Time: 04/25/24  8:04 AM   Result Value Ref Range    POCT Glucose 148 (H) 74 - 99 mg/dL   Calcium, ionized    Collection Time: 04/25/24  9:10 AM   Result Value Ref Range    POCT Calcium, Ionized 1.13 1.1 - 1.33 mmol/L   Magnesium    Collection Time: 04/25/24  9:10 AM   Result Value Ref Range    Magnesium 2.66 (H) 1.60 - 2.40 mg/dL   Hepatic function panel    Collection Time: 04/25/24  9:10 AM   Result Value Ref Range    Albumin 3.5 3.4 - 5.0 g/dL    Bilirubin, Total 25.8 (H) 0.0 - 1.2 mg/dL    Bilirubin, Direct 17.4 (H) 0.0 - 0.3 mg/dL    Alkaline Phosphatase 161 (H) 33 - 136 U/L    ALT 16 10 - 52 U/L     (H) 9 - 39 U/L    Total Protein 5.0 (L) 6.4 - 8.2 g/dL   Coagulation Screen    Collection Time: 04/25/24  9:10 AM   Result Value Ref Range    Protime 14.1 (H) 9.8 - 12.8 seconds    INR 1.3 (H)  0.9 - 1.1    aPTT 55 (H) 27 - 38 seconds   CBC    Collection Time: 04/25/24  9:10 AM   Result Value Ref Range    WBC 14.6 (H) 4.4 - 11.3 x10*3/uL    nRBC 5.7 (H) 0.0 - 0.0 /100 WBCs    RBC 3.21 (L) 4.50 - 5.90 x10*6/uL    Hemoglobin 9.4 (L) 13.5 - 17.5 g/dL    Hematocrit 26.2 (L) 41.0 - 52.0 %    MCV 82 80 - 100 fL    MCH 29.3 26.0 - 34.0 pg    MCHC 35.9 32.0 - 36.0 g/dL    RDW 20.7 (H) 11.5 - 14.5 %    Platelets 76 (L) 150 - 450 x10*3/uL   Basic Metabolic Panel    Collection Time: 04/25/24  9:10 AM   Result Value Ref Range    Glucose 142 (H) 74 - 99 mg/dL    Sodium 137 136 - 145 mmol/L    Potassium 4.2 3.5 - 5.3 mmol/L    Chloride 102 98 - 107 mmol/L    Bicarbonate 22 21 - 32 mmol/L    Anion Gap 17 10 - 20 mmol/L    Urea Nitrogen 22 6 - 23 mg/dL    Creatinine 0.72 0.50 - 1.30 mg/dL    eGFR >90 >60 mL/min/1.73m*2    Calcium 8.6 8.6 - 10.6 mg/dL   Phosphorus    Collection Time: 04/25/24  9:10 AM   Result Value Ref Range    Phosphorus 2.6 2.5 - 4.9 mg/dL   POCT GLUCOSE    Collection Time: 04/25/24 12:00 PM   Result Value Ref Range    POCT Glucose 145 (H) 74 - 99 mg/dL   Magnesium    Collection Time: 04/25/24 12:48 PM   Result Value Ref Range    Magnesium 2.75 (H) 1.60 - 2.40 mg/dL   Hepatic function panel    Collection Time: 04/25/24 12:48 PM   Result Value Ref Range    Albumin 3.7 3.4 - 5.0 g/dL    Bilirubin, Total 26.6 (H) 0.0 - 1.2 mg/dL    Bilirubin, Direct 17.7 (H) 0.0 - 0.3 mg/dL    Alkaline Phosphatase 164 (H) 33 - 136 U/L    ALT 17 10 - 52 U/L     (H) 9 - 39 U/L    Total Protein 5.3 (L) 6.4 - 8.2 g/dL   Coagulation Screen    Collection Time: 04/25/24 12:48 PM   Result Value Ref Range    Protime 14.3 (H) 9.8 - 12.8 seconds    INR 1.3 (H) 0.9 - 1.1    aPTT 55 (H) 27 - 38 seconds   CBC    Collection Time: 04/25/24 12:48 PM   Result Value Ref Range    WBC 14.8 (H) 4.4 - 11.3 x10*3/uL    nRBC 4.7 (H) 0.0 - 0.0 /100 WBCs    RBC 3.24 (L) 4.50 - 5.90 x10*6/uL    Hemoglobin 9.5 (L) 13.5 - 17.5 g/dL    Hematocrit  26.5 (L) 41.0 - 52.0 %    MCV 82 80 - 100 fL    MCH 29.3 26.0 - 34.0 pg    MCHC 35.8 32.0 - 36.0 g/dL    RDW 20.9 (H) 11.5 - 14.5 %    Platelets 74 (L) 150 - 450 x10*3/uL   Calcium, ionized    Collection Time: 04/25/24 12:48 PM   Result Value Ref Range    POCT Calcium, Ionized 1.16 1.1 - 1.33 mmol/L   Basic Metabolic Panel    Collection Time: 04/25/24 12:48 PM   Result Value Ref Range    Glucose 144 (H) 74 - 99 mg/dL    Sodium 135 (L) 136 - 145 mmol/L    Potassium 4.2 3.5 - 5.3 mmol/L    Chloride 99 98 - 107 mmol/L    Bicarbonate 22 21 - 32 mmol/L    Anion Gap 18 10 - 20 mmol/L    Urea Nitrogen 23 6 - 23 mg/dL    Creatinine 0.81 0.50 - 1.30 mg/dL    eGFR >90 >60 mL/min/1.73m*2    Calcium 8.6 8.6 - 10.6 mg/dL   Phosphorus    Collection Time: 04/25/24 12:48 PM   Result Value Ref Range    Phosphorus 2.3 (L) 2.5 - 4.9 mg/dL

## 2024-04-25 NOTE — PROGRESS NOTES
PLAN: Ongoing ICU tlc.     PAYOR: Maryse Commercial      DISPO: [email or web address]     SUPPORT/CONTACT: Lakisha [phone]

## 2024-04-26 ENCOUNTER — ANESTHESIA (OUTPATIENT)
Dept: OPERATING ROOM | Facility: HOSPITAL | Age: 64
DRG: 003 | End: 2024-04-26
Payer: COMMERCIAL

## 2024-04-26 ENCOUNTER — ANESTHESIA EVENT (OUTPATIENT)
Dept: OPERATING ROOM | Facility: HOSPITAL | Age: 64
DRG: 003 | End: 2024-04-26
Payer: COMMERCIAL

## 2024-04-26 ENCOUNTER — APPOINTMENT (OUTPATIENT)
Dept: RADIOLOGY | Facility: HOSPITAL | Age: 64
DRG: 003 | End: 2024-04-26
Payer: COMMERCIAL

## 2024-04-26 VITALS
TEMPERATURE: 95.7 F | WEIGHT: 268.52 LBS | BODY MASS INDEX: 38.44 KG/M2 | OXYGEN SATURATION: 97 % | HEART RATE: 112 BPM | HEIGHT: 70 IN | DIASTOLIC BLOOD PRESSURE: 67 MMHG | SYSTOLIC BLOOD PRESSURE: 114 MMHG

## 2024-04-26 PROBLEM — K31.89 GASTRIC PERFORATION, ACUTE: Status: ACTIVE | Noted: 2024-02-28

## 2024-04-26 LAB
ALBUMIN SERPL BCP-MCNC: 3.9 G/DL (ref 3.4–5)
ALBUMIN SERPL BCP-MCNC: 3.9 G/DL (ref 3.4–5)
ALP SERPL-CCNC: 144 U/L (ref 33–136)
ALP SERPL-CCNC: 149 U/L (ref 33–136)
ALT SERPL W P-5'-P-CCNC: 16 U/L (ref 10–52)
ALT SERPL W P-5'-P-CCNC: 17 U/L (ref 10–52)
ANION GAP BLDA CALCULATED.4IONS-SCNC: 15 MMO/L (ref 10–25)
ANION GAP BLDA CALCULATED.4IONS-SCNC: 18 MMO/L (ref 10–25)
ANION GAP BLDA CALCULATED.4IONS-SCNC: 19 MMO/L (ref 10–25)
ANION GAP BLDA CALCULATED.4IONS-SCNC: ABNORMAL MMOL/L
ANION GAP SERPL CALC-SCNC: 19 MMOL/L (ref 10–20)
ANION GAP SERPL CALC-SCNC: 20 MMOL/L (ref 10–20)
APTT PPP: 61 SECONDS (ref 27–38)
APTT PPP: 64 SECONDS (ref 27–38)
APTT PPP: 68 SECONDS (ref 27–38)
AST SERPL W P-5'-P-CCNC: 107 U/L (ref 9–39)
AST SERPL W P-5'-P-CCNC: 110 U/L (ref 9–39)
BASE EXCESS BLDA CALC-SCNC: -10.8 MMOL/L (ref -2–3)
BASE EXCESS BLDA CALC-SCNC: -11.6 MMOL/L (ref -2–3)
BASE EXCESS BLDA CALC-SCNC: -3.8 MMOL/L (ref -2–3)
BASE EXCESS BLDA CALC-SCNC: -7.6 MMOL/L (ref -2–3)
BASE EXCESS BLDA CALC-SCNC: -8.2 MMOL/L (ref -2–3)
BASE EXCESS BLDA CALC-SCNC: -8.5 MMOL/L (ref -2–3)
BILIRUB DIRECT SERPL-MCNC: 17.7 MG/DL (ref 0–0.3)
BILIRUB DIRECT SERPL-MCNC: 19.2 MG/DL (ref 0–0.3)
BILIRUB SERPL-MCNC: 27 MG/DL (ref 0–1.2)
BILIRUB SERPL-MCNC: 28 MG/DL (ref 0–1.2)
BLOOD EXPIRATION DATE: NORMAL
BODY TEMPERATURE: 37 DEGREES CELSIUS
BUN SERPL-MCNC: 22 MG/DL (ref 6–23)
BUN SERPL-MCNC: 23 MG/DL (ref 6–23)
CA-I BLD-SCNC: 1.13 MMOL/L (ref 1.1–1.33)
CA-I BLD-SCNC: 1.21 MMOL/L (ref 1.1–1.33)
CA-I BLDA-SCNC: 1.17 MMOL/L (ref 1.1–1.33)
CA-I BLDA-SCNC: 1.22 MMOL/L (ref 1.1–1.33)
CA-I BLDA-SCNC: 1.24 MMOL/L (ref 1.1–1.33)
CA-I BLDA-SCNC: 1.24 MMOL/L (ref 1.1–1.33)
CA-I BLDA-SCNC: 1.25 MMOL/L (ref 1.1–1.33)
CA-I BLDA-SCNC: 1.29 MMOL/L (ref 1.1–1.33)
CALCIUM SERPL-MCNC: 8.8 MG/DL (ref 8.6–10.6)
CALCIUM SERPL-MCNC: 9.1 MG/DL (ref 8.6–10.6)
CHLORIDE BLDA-SCNC: 100 MMOL/L (ref 98–107)
CHLORIDE BLDA-SCNC: 101 MMOL/L (ref 98–107)
CHLORIDE BLDA-SCNC: 102 MMOL/L (ref 98–107)
CHLORIDE BLDA-SCNC: 102 MMOL/L (ref 98–107)
CHLORIDE SERPL-SCNC: 100 MMOL/L (ref 98–107)
CHLORIDE SERPL-SCNC: 98 MMOL/L (ref 98–107)
CO2 SERPL-SCNC: 21 MMOL/L (ref 21–32)
CO2 SERPL-SCNC: 23 MMOL/L (ref 21–32)
COHGB MFR BLDA: 2 %
CREAT SERPL-MCNC: 0.76 MG/DL (ref 0.5–1.3)
CREAT SERPL-MCNC: 0.79 MG/DL (ref 0.5–1.3)
DISPENSE STATUS: NORMAL
DO-HGB MFR BLDA: 0 % (ref 0–5)
EGFRCR SERPLBLD CKD-EPI 2021: >90 ML/MIN/1.73M*2
EGFRCR SERPLBLD CKD-EPI 2021: >90 ML/MIN/1.73M*2
ERYTHROCYTE [DISTWIDTH] IN BLOOD BY AUTOMATED COUNT: 21.2 % (ref 11.5–14.5)
ERYTHROCYTE [DISTWIDTH] IN BLOOD BY AUTOMATED COUNT: 22.4 % (ref 11.5–14.5)
ERYTHROCYTE [DISTWIDTH] IN BLOOD BY AUTOMATED COUNT: 22.5 % (ref 11.5–14.5)
ERYTHROCYTE [DISTWIDTH] IN BLOOD BY AUTOMATED COUNT: 22.7 % (ref 11.5–14.5)
ERYTHROCYTE [DISTWIDTH] IN BLOOD BY AUTOMATED COUNT: 22.7 % (ref 11.5–14.5)
GLUCOSE BLD MANUAL STRIP-MCNC: 103 MG/DL (ref 74–99)
GLUCOSE BLD MANUAL STRIP-MCNC: 104 MG/DL (ref 74–99)
GLUCOSE BLDA-MCNC: 103 MG/DL (ref 74–99)
GLUCOSE BLDA-MCNC: 113 MG/DL (ref 74–99)
GLUCOSE BLDA-MCNC: 121 MG/DL (ref 74–99)
GLUCOSE BLDA-MCNC: 158 MG/DL (ref 74–99)
GLUCOSE BLDA-MCNC: 68 MG/DL (ref 74–99)
GLUCOSE BLDA-MCNC: 90 MG/DL (ref 74–99)
GLUCOSE SERPL-MCNC: 137 MG/DL (ref 74–99)
GLUCOSE SERPL-MCNC: 161 MG/DL (ref 74–99)
HCO3 BLDA-SCNC: 16 MMOL/L (ref 22–26)
HCO3 BLDA-SCNC: 16.8 MMOL/L (ref 22–26)
HCO3 BLDA-SCNC: 17.9 MMOL/L (ref 22–26)
HCO3 BLDA-SCNC: 18.2 MMOL/L (ref 22–26)
HCO3 BLDA-SCNC: 18.8 MMOL/L (ref 22–26)
HCO3 BLDA-SCNC: 21.5 MMOL/L (ref 22–26)
HCT VFR BLD AUTO: 25.5 % (ref 41–52)
HCT VFR BLD AUTO: 27.3 % (ref 41–52)
HCT VFR BLD AUTO: 28.1 % (ref 41–52)
HCT VFR BLD AUTO: 28.9 % (ref 41–52)
HCT VFR BLD AUTO: 29.1 % (ref 41–52)
HCT VFR BLD EST: 24 % (ref 41–52)
HCT VFR BLD EST: 28 % (ref 41–52)
HCT VFR BLD EST: 28 % (ref 41–52)
HCT VFR BLD EST: 29 % (ref 41–52)
HCT VFR BLD EST: 29 % (ref 41–52)
HCT VFR BLD EST: 30 % (ref 41–52)
HGB BLD-MCNC: 8.8 G/DL (ref 13.5–17.5)
HGB BLD-MCNC: 8.9 G/DL (ref 13.5–17.5)
HGB BLD-MCNC: 9.1 G/DL (ref 13.5–17.5)
HGB BLD-MCNC: 9.5 G/DL (ref 13.5–17.5)
HGB BLD-MCNC: 9.6 G/DL (ref 13.5–17.5)
HGB BLDA-MCNC: 10 G/DL (ref 13.5–17.5)
HGB BLDA-MCNC: 8 G/DL (ref 13.5–17.5)
HGB BLDA-MCNC: 8 G/DL (ref 13.5–17.5)
HGB BLDA-MCNC: 9.2 G/DL (ref 13.5–17.5)
HGB BLDA-MCNC: 9.4 G/DL (ref 13.5–17.5)
HGB BLDA-MCNC: 9.8 G/DL (ref 13.5–17.5)
HGB BLDA-MCNC: 9.8 G/DL (ref 13.5–17.5)
INHALED O2 CONCENTRATION: 40 %
INHALED O2 CONCENTRATION: 50 %
INR PPP: 1.3 (ref 0.9–1.1)
LACTATE BLDA-SCNC: 10 MMOL/L (ref 0.4–2)
LACTATE BLDA-SCNC: 3.9 MMOL/L (ref 0.4–2)
LACTATE BLDA-SCNC: 6.7 MMOL/L (ref 0.4–2)
LACTATE BLDA-SCNC: 7.2 MMOL/L (ref 0.4–2)
LACTATE BLDA-SCNC: 7.2 MMOL/L (ref 0.4–2)
LACTATE BLDA-SCNC: 8.5 MMOL/L (ref 0.4–2)
MAGNESIUM SERPL-MCNC: 2.72 MG/DL (ref 1.6–2.4)
MAGNESIUM SERPL-MCNC: 2.82 MG/DL (ref 1.6–2.4)
MCH RBC QN AUTO: 28.1 PG (ref 26–34)
MCH RBC QN AUTO: 28.2 PG (ref 26–34)
MCH RBC QN AUTO: 28.6 PG (ref 26–34)
MCH RBC QN AUTO: 29.2 PG (ref 26–34)
MCH RBC QN AUTO: 29.2 PG (ref 26–34)
MCHC RBC AUTO-ENTMCNC: 32.4 G/DL (ref 32–36)
MCHC RBC AUTO-ENTMCNC: 32.6 G/DL (ref 32–36)
MCHC RBC AUTO-ENTMCNC: 32.9 G/DL (ref 32–36)
MCHC RBC AUTO-ENTMCNC: 33 G/DL (ref 32–36)
MCHC RBC AUTO-ENTMCNC: 34.5 G/DL (ref 32–36)
MCV RBC AUTO: 83 FL (ref 80–100)
MCV RBC AUTO: 86 FL (ref 80–100)
MCV RBC AUTO: 87 FL (ref 80–100)
MCV RBC AUTO: 88 FL (ref 80–100)
MCV RBC AUTO: 90 FL (ref 80–100)
METHGB MFR BLDA: 1.2 % (ref 0–1.5)
NRBC BLD-RTO: 3.7 /100 WBCS (ref 0–0)
NRBC BLD-RTO: 3.7 /100 WBCS (ref 0–0)
NRBC BLD-RTO: 4.6 /100 WBCS (ref 0–0)
NRBC BLD-RTO: 4.9 /100 WBCS (ref 0–0)
NRBC BLD-RTO: 5.7 /100 WBCS (ref 0–0)
OXYHGB MFR BLDA: 92.8 % (ref 94–98)
OXYHGB MFR BLDA: 93 % (ref 94–98)
OXYHGB MFR BLDA: 94.8 % (ref 94–98)
OXYHGB MFR BLDA: 94.9 % (ref 94–98)
OXYHGB MFR BLDA: 95.4 % (ref 94–98)
OXYHGB MFR BLDA: 96.7 % (ref 94–98)
OXYHGB MFR BLDA: 96.7 % (ref 94–98)
PCO2 BLDA: 37 MM HG (ref 38–42)
PCO2 BLDA: 38 MM HG (ref 38–42)
PCO2 BLDA: 39 MM HG (ref 38–42)
PCO2 BLDA: 43 MM HG (ref 38–42)
PCO2 BLDA: 44 MM HG (ref 38–42)
PCO2 BLDA: 46 MM HG (ref 38–42)
PH BLDA: 7.18 PH (ref 7.38–7.42)
PH BLDA: 7.19 PH (ref 7.38–7.42)
PH BLDA: 7.22 PH (ref 7.38–7.42)
PH BLDA: 7.28 PH (ref 7.38–7.42)
PH BLDA: 7.3 PH (ref 7.38–7.42)
PH BLDA: 7.35 PH (ref 7.38–7.42)
PHOSPHATE SERPL-MCNC: 2.7 MG/DL (ref 2.5–4.9)
PHOSPHATE SERPL-MCNC: 2.8 MG/DL (ref 2.5–4.9)
PLATELET # BLD AUTO: 55 X10*3/UL (ref 150–450)
PLATELET # BLD AUTO: 57 X10*3/UL (ref 150–450)
PLATELET # BLD AUTO: 66 X10*3/UL (ref 150–450)
PO2 BLDA: 276 MM HG (ref 85–95)
PO2 BLDA: 76 MM HG (ref 85–95)
PO2 BLDA: 77 MM HG (ref 85–95)
PO2 BLDA: 86 MM HG (ref 85–95)
PO2 BLDA: 91 MM HG (ref 85–95)
PO2 BLDA: 94 MM HG (ref 85–95)
POTASSIUM BLDA-SCNC: 4 MMOL/L (ref 3.5–5.3)
POTASSIUM BLDA-SCNC: 4 MMOL/L (ref 3.5–5.3)
POTASSIUM BLDA-SCNC: 4.1 MMOL/L (ref 3.5–5.3)
POTASSIUM BLDA-SCNC: 4.1 MMOL/L (ref 3.5–5.3)
POTASSIUM BLDA-SCNC: 4.3 MMOL/L (ref 3.5–5.3)
POTASSIUM BLDA-SCNC: 4.4 MMOL/L (ref 3.5–5.3)
POTASSIUM SERPL-SCNC: 3.9 MMOL/L (ref 3.5–5.3)
POTASSIUM SERPL-SCNC: 4.3 MMOL/L (ref 3.5–5.3)
PRODUCT BLOOD TYPE: 5100
PRODUCT BLOOD TYPE: 6200
PRODUCT BLOOD TYPE: 9500
PRODUCT BLOOD TYPE: 9500
PRODUCT CODE: NORMAL
PROT SERPL-MCNC: 5.4 G/DL (ref 6.4–8.2)
PROT SERPL-MCNC: 5.5 G/DL (ref 6.4–8.2)
PROTHROMBIN TIME: 14.3 SECONDS (ref 9.8–12.8)
PROTHROMBIN TIME: 14.5 SECONDS (ref 9.8–12.8)
PROTHROMBIN TIME: 14.7 SECONDS (ref 9.8–12.8)
RBC # BLD AUTO: 3.05 X10*6/UL (ref 4.5–5.9)
RBC # BLD AUTO: 3.08 X10*6/UL (ref 4.5–5.9)
RBC # BLD AUTO: 3.23 X10*6/UL (ref 4.5–5.9)
RBC # BLD AUTO: 3.29 X10*6/UL (ref 4.5–5.9)
RBC # BLD AUTO: 3.38 X10*6/UL (ref 4.5–5.9)
SAO2 % BLDA: 100 % (ref 94–100)
SAO2 % BLDA: 96 % (ref 94–100)
SAO2 % BLDA: 96 % (ref 94–100)
SAO2 % BLDA: 98 % (ref 94–100)
SAO2 % BLDA: 98 % (ref 94–100)
SAO2 % BLDA: 99 % (ref 94–100)
SODIUM BLDA-SCNC: 131 MMOL/L (ref 136–145)
SODIUM BLDA-SCNC: 133 MMOL/L (ref 136–145)
SODIUM BLDA-SCNC: 134 MMOL/L (ref 136–145)
SODIUM BLDA-SCNC: ABNORMAL MMOL/L
SODIUM SERPL-SCNC: 136 MMOL/L (ref 136–145)
SODIUM SERPL-SCNC: 137 MMOL/L (ref 136–145)
UNIT ABO: NORMAL
UNIT NUMBER: NORMAL
UNIT RH: NORMAL
UNIT VOLUME: 204
UNIT VOLUME: 218
UNIT VOLUME: 229
UNIT VOLUME: 260
UNIT VOLUME: 275
UNIT VOLUME: 324
UNIT VOLUME: 331
UNIT VOLUME: 350
UNIT VOLUME: 350
UNIT VOLUME: 579
WBC # BLD AUTO: 13.5 X10*3/UL (ref 4.4–11.3)
WBC # BLD AUTO: 13.5 X10*3/UL (ref 4.4–11.3)
WBC # BLD AUTO: 13.9 X10*3/UL (ref 4.4–11.3)
WBC # BLD AUTO: 14.6 X10*3/UL (ref 4.4–11.3)
WBC # BLD AUTO: 14.8 X10*3/UL (ref 4.4–11.3)
XM INTEP: NORMAL

## 2024-04-26 PROCEDURE — P9040 RBC LEUKOREDUCED IRRADIATED: HCPCS

## 2024-04-26 PROCEDURE — P9047 ALBUMIN (HUMAN), 25%, 50ML: HCPCS | Mod: JZ | Performed by: NURSE PRACTITIONER

## 2024-04-26 PROCEDURE — 84132 ASSAY OF SERUM POTASSIUM: CPT

## 2024-04-26 PROCEDURE — 74174 CTA ABD&PLVS W/CONTRAST: CPT | Performed by: RADIOLOGY

## 2024-04-26 PROCEDURE — 84132 ASSAY OF SERUM POTASSIUM: CPT | Performed by: STUDENT IN AN ORGANIZED HEALTH CARE EDUCATION/TRAINING PROGRAM

## 2024-04-26 PROCEDURE — 3700000001 HC GENERAL ANESTHESIA TIME - INITIAL BASE CHARGE: Performed by: SURGERY

## 2024-04-26 PROCEDURE — P9047 ALBUMIN (HUMAN), 25%, 50ML: HCPCS | Mod: JZ | Performed by: ANESTHESIOLOGIST ASSISTANT

## 2024-04-26 PROCEDURE — 2500000004 HC RX 250 GENERAL PHARMACY W/ HCPCS (ALT 636 FOR OP/ED): Performed by: ANESTHESIOLOGIST ASSISTANT

## 2024-04-26 PROCEDURE — 2500000005 HC RX 250 GENERAL PHARMACY W/O HCPCS: Performed by: ANESTHESIOLOGIST ASSISTANT

## 2024-04-26 PROCEDURE — 2500000004 HC RX 250 GENERAL PHARMACY W/ HCPCS (ALT 636 FOR OP/ED): Performed by: STUDENT IN AN ORGANIZED HEALTH CARE EDUCATION/TRAINING PROGRAM

## 2024-04-26 PROCEDURE — P9037 PLATE PHERES LEUKOREDU IRRAD: HCPCS

## 2024-04-26 PROCEDURE — P9045 ALBUMIN (HUMAN), 5%, 250 ML: HCPCS | Mod: JZ | Performed by: ANESTHESIOLOGIST ASSISTANT

## 2024-04-26 PROCEDURE — 0W9G0ZZ DRAINAGE OF PERITONEAL CAVITY, OPEN APPROACH: ICD-10-PCS | Performed by: SURGERY

## 2024-04-26 PROCEDURE — A4217 STERILE WATER/SALINE, 500 ML: HCPCS | Performed by: SURGERY

## 2024-04-26 PROCEDURE — 85730 THROMBOPLASTIN TIME PARTIAL: CPT | Performed by: STUDENT IN AN ORGANIZED HEALTH CARE EDUCATION/TRAINING PROGRAM

## 2024-04-26 PROCEDURE — 82947 ASSAY GLUCOSE BLOOD QUANT: CPT

## 2024-04-26 PROCEDURE — 94799 UNLISTED PULMONARY SVC/PX: CPT

## 2024-04-26 PROCEDURE — 2500000002 HC RX 250 W HCPCS SELF ADMINISTERED DRUGS (ALT 637 FOR MEDICARE OP, ALT 636 FOR OP/ED): Performed by: NURSE PRACTITIONER

## 2024-04-26 PROCEDURE — 2500000005 HC RX 250 GENERAL PHARMACY W/O HCPCS: Performed by: STUDENT IN AN ORGANIZED HEALTH CARE EDUCATION/TRAINING PROGRAM

## 2024-04-26 PROCEDURE — C9113 INJ PANTOPRAZOLE SODIUM, VIA: HCPCS | Performed by: STUDENT IN AN ORGANIZED HEALTH CARE EDUCATION/TRAINING PROGRAM

## 2024-04-26 PROCEDURE — 3700000002 HC GENERAL ANESTHESIA TIME - EACH INCREMENTAL 1 MINUTE: Performed by: SURGERY

## 2024-04-26 PROCEDURE — 85027 COMPLETE CBC AUTOMATED: CPT | Performed by: STUDENT IN AN ORGANIZED HEALTH CARE EDUCATION/TRAINING PROGRAM

## 2024-04-26 PROCEDURE — 94003 VENT MGMT INPAT SUBQ DAY: CPT

## 2024-04-26 PROCEDURE — 37799 UNLISTED PX VASCULAR SURGERY: CPT | Performed by: STUDENT IN AN ORGANIZED HEALTH CARE EDUCATION/TRAINING PROGRAM

## 2024-04-26 PROCEDURE — 2500000001 HC RX 250 WO HCPCS SELF ADMINISTERED DRUGS (ALT 637 FOR MEDICARE OP): Performed by: NURSE PRACTITIONER

## 2024-04-26 PROCEDURE — 2720000007 HC OR 272 NO HCPCS: Performed by: SURGERY

## 2024-04-26 PROCEDURE — 74018 RADEX ABDOMEN 1 VIEW: CPT | Performed by: RADIOLOGY

## 2024-04-26 PROCEDURE — 99140 ANES COMP EMERGENCY COND: CPT | Performed by: STUDENT IN AN ORGANIZED HEALTH CARE EDUCATION/TRAINING PROGRAM

## 2024-04-26 PROCEDURE — 43631 REMOVAL OF STOMACH PARTIAL: CPT | Performed by: SURGERY

## 2024-04-26 PROCEDURE — 84100 ASSAY OF PHOSPHORUS: CPT | Performed by: STUDENT IN AN ORGANIZED HEALTH CARE EDUCATION/TRAINING PROGRAM

## 2024-04-26 PROCEDURE — 2500000004 HC RX 250 GENERAL PHARMACY W/ HCPCS (ALT 636 FOR OP/ED): Performed by: SURGERY

## 2024-04-26 PROCEDURE — 3600000003 HC OR TIME - INITIAL BASE CHARGE - PROCEDURE LEVEL THREE: Performed by: SURGERY

## 2024-04-26 PROCEDURE — 2500000004 HC RX 250 GENERAL PHARMACY W/ HCPCS (ALT 636 FOR OP/ED): Mod: JZ | Performed by: NURSE PRACTITIONER

## 2024-04-26 PROCEDURE — 74018 RADEX ABDOMEN 1 VIEW: CPT

## 2024-04-26 PROCEDURE — 99232 SBSQ HOSP IP/OBS MODERATE 35: CPT | Performed by: OTOLARYNGOLOGY

## 2024-04-26 PROCEDURE — 82375 ASSAY CARBOXYHB QUANT: CPT

## 2024-04-26 PROCEDURE — 83735 ASSAY OF MAGNESIUM: CPT | Performed by: STUDENT IN AN ORGANIZED HEALTH CARE EDUCATION/TRAINING PROGRAM

## 2024-04-26 PROCEDURE — 3600000008 HC OR TIME - EACH INCREMENTAL 1 MINUTE - PROCEDURE LEVEL THREE: Performed by: SURGERY

## 2024-04-26 PROCEDURE — 36430 TRANSFUSION BLD/BLD COMPNT: CPT

## 2024-04-26 PROCEDURE — 2550000001 HC RX 255 CONTRASTS

## 2024-04-26 PROCEDURE — 71275 CT ANGIOGRAPHY CHEST: CPT

## 2024-04-26 PROCEDURE — 85610 PROTHROMBIN TIME: CPT | Performed by: STUDENT IN AN ORGANIZED HEALTH CARE EDUCATION/TRAINING PROGRAM

## 2024-04-26 PROCEDURE — 71275 CT ANGIOGRAPHY CHEST: CPT | Performed by: RADIOLOGY

## 2024-04-26 PROCEDURE — A43631 PR REMV STOMACH,PART,DISTAL,GASTRODUOD: Performed by: STUDENT IN AN ORGANIZED HEALTH CARE EDUCATION/TRAINING PROGRAM

## 2024-04-26 PROCEDURE — 2500000004 HC RX 250 GENERAL PHARMACY W/ HCPCS (ALT 636 FOR OP/ED)

## 2024-04-26 PROCEDURE — 2500000005 HC RX 250 GENERAL PHARMACY W/O HCPCS: Performed by: PHYSICIAN ASSISTANT

## 2024-04-26 PROCEDURE — 82330 ASSAY OF CALCIUM: CPT | Performed by: STUDENT IN AN ORGANIZED HEALTH CARE EDUCATION/TRAINING PROGRAM

## 2024-04-26 PROCEDURE — 82248 BILIRUBIN DIRECT: CPT | Performed by: STUDENT IN AN ORGANIZED HEALTH CARE EDUCATION/TRAINING PROGRAM

## 2024-04-26 PROCEDURE — A43631 PR REMV STOMACH,PART,DISTAL,GASTRODUOD: Performed by: ANESTHESIOLOGIST ASSISTANT

## 2024-04-26 PROCEDURE — 2500000004 HC RX 250 GENERAL PHARMACY W/ HCPCS (ALT 636 FOR OP/ED): Performed by: NURSE PRACTITIONER

## 2024-04-26 PROCEDURE — 0BC18ZZ EXTIRPATION OF MATTER FROM TRACHEA, VIA NATURAL OR ARTIFICIAL OPENING ENDOSCOPIC: ICD-10-PCS | Performed by: OTOLARYNGOLOGY

## 2024-04-26 PROCEDURE — 99291 CRITICAL CARE FIRST HOUR: CPT | Performed by: STUDENT IN AN ORGANIZED HEALTH CARE EDUCATION/TRAINING PROGRAM

## 2024-04-26 RX ORDER — FENTANYL CITRATE-0.9 % NACL/PF 10 MCG/ML
0-300 PLASTIC BAG, INJECTION (ML) INTRAVENOUS CONTINUOUS
Status: DISCONTINUED | OUTPATIENT
Start: 2024-04-26 | End: 2024-04-27 | Stop reason: HOSPADM

## 2024-04-26 RX ORDER — ALBUMIN HUMAN 50 G/1000ML
25 SOLUTION INTRAVENOUS ONCE
Status: DISCONTINUED | OUTPATIENT
Start: 2024-04-26 | End: 2024-04-26

## 2024-04-26 RX ORDER — VASOPRESSIN 20 U/ML
INJECTION PARENTERAL CONTINUOUS PRN
Status: DISCONTINUED | OUTPATIENT
Start: 2024-04-26 | End: 2024-04-26

## 2024-04-26 RX ORDER — MIDAZOLAM HYDROCHLORIDE 1 MG/ML
2 INJECTION INTRAMUSCULAR; INTRAVENOUS ONCE
Status: COMPLETED | OUTPATIENT
Start: 2024-04-26 | End: 2024-04-26

## 2024-04-26 RX ORDER — NOREPINEPHRINE BITARTRATE 1 MG/ML
INJECTION, SOLUTION INTRAVENOUS CONTINUOUS PRN
Status: DISCONTINUED | OUTPATIENT
Start: 2024-04-26 | End: 2024-04-26

## 2024-04-26 RX ORDER — ALBUMIN HUMAN 50 G/1000ML
SOLUTION INTRAVENOUS AS NEEDED
Status: DISCONTINUED | OUTPATIENT
Start: 2024-04-26 | End: 2024-04-26

## 2024-04-26 RX ORDER — ROCURONIUM BROMIDE 10 MG/ML
INJECTION, SOLUTION INTRAVENOUS AS NEEDED
Status: DISCONTINUED | OUTPATIENT
Start: 2024-04-26 | End: 2024-04-26

## 2024-04-26 RX ORDER — SODIUM CHLORIDE 0.9 G/100ML
IRRIGANT IRRIGATION AS NEEDED
Status: DISCONTINUED | OUTPATIENT
Start: 2024-04-26 | End: 2024-04-26 | Stop reason: HOSPADM

## 2024-04-26 RX ORDER — ALBUMIN HUMAN 250 G/1000ML
SOLUTION INTRAVENOUS CONTINUOUS PRN
Status: DISCONTINUED | OUTPATIENT
Start: 2024-04-26 | End: 2024-04-26

## 2024-04-26 RX ORDER — GLYCOPYRROLATE 0.2 MG/ML
1 INJECTION INTRAMUSCULAR; INTRAVENOUS ONCE
Status: COMPLETED | OUTPATIENT
Start: 2024-04-26 | End: 2024-04-26

## 2024-04-26 RX ORDER — NEOSTIGMINE METHYLSULFATE 1 MG/ML
5 INJECTION INTRAVENOUS ONCE
Status: COMPLETED | OUTPATIENT
Start: 2024-04-26 | End: 2024-04-26

## 2024-04-26 RX ORDER — GLYCOPYRROLATE 0.2 MG/ML
0.2 INJECTION INTRAMUSCULAR; INTRAVENOUS ONCE
Status: DISCONTINUED | OUTPATIENT
Start: 2024-04-26 | End: 2024-04-27 | Stop reason: HOSPADM

## 2024-04-26 RX ADMIN — Medication 25 MCG/HR: at 13:47

## 2024-04-26 RX ADMIN — NEOSTIGMINE METHYLSULFATE 5 MG: 1 INJECTION INTRAVENOUS at 14:11

## 2024-04-26 RX ADMIN — VASOPRESSIN 0.03 MILLI-UNITS/MIN: 20 INJECTION, SOLUTION INTRAVENOUS at 10:17

## 2024-04-26 RX ADMIN — ROCURONIUM BROMIDE 50 MG: 10 INJECTION INTRAVENOUS at 10:18

## 2024-04-26 RX ADMIN — ALBUMIN HUMAN 250 ML: 0.05 INJECTION, SOLUTION INTRAVENOUS at 10:21

## 2024-04-26 RX ADMIN — NOREPINEPHRINE BITARTRATE 0.32 MCG/KG/MIN: 1 INJECTION INTRAVENOUS at 10:17

## 2024-04-26 RX ADMIN — ROCURONIUM BROMIDE 30 MG: 10 INJECTION INTRAVENOUS at 12:10

## 2024-04-26 RX ADMIN — ALBUMIN HUMAN: 0.25 SOLUTION INTRAVENOUS at 10:49

## 2024-04-26 RX ADMIN — SODIUM CHLORIDE, SODIUM LACTATE, POTASSIUM CHLORIDE, AND CALCIUM CHLORIDE: 600; 310; 30; 20 INJECTION, SOLUTION INTRAVENOUS at 11:02

## 2024-04-26 RX ADMIN — ALBUMIN HUMAN 250 ML: 0.05 INJECTION, SOLUTION INTRAVENOUS at 11:24

## 2024-04-26 RX ADMIN — MIDODRINE HYDROCHLORIDE 20 MG: 10 TABLET ORAL at 00:35

## 2024-04-26 RX ADMIN — HYDROCORTISONE SODIUM SUCCINATE 50 MG: 100 INJECTION, POWDER, FOR SOLUTION INTRAMUSCULAR; INTRAVENOUS at 06:32

## 2024-04-26 RX ADMIN — IOHEXOL 90 ML: 350 INJECTION, SOLUTION INTRAVENOUS at 05:30

## 2024-04-26 RX ADMIN — ROCURONIUM BROMIDE 50 MG: 10 INJECTION INTRAVENOUS at 10:39

## 2024-04-26 RX ADMIN — MEROPENEM 1 G: 1 INJECTION, POWDER, FOR SOLUTION INTRAVENOUS at 09:22

## 2024-04-26 RX ADMIN — NOREPINEPHRINE BITARTRATE 0.46 MCG/KG/MIN: 8 INJECTION, SOLUTION INTRAVENOUS at 14:41

## 2024-04-26 RX ADMIN — HEPARIN SODIUM 1000 UNITS: 1000 INJECTION INTRAVENOUS; SUBCUTANEOUS at 09:52

## 2024-04-26 RX ADMIN — ALBUMIN HUMAN 250 ML: 0.05 INJECTION, SOLUTION INTRAVENOUS at 10:36

## 2024-04-26 RX ADMIN — THIAMINE HCL TAB 100 MG 500 MG: 100 TAB at 00:49

## 2024-04-26 RX ADMIN — ALBUMIN HUMAN 250 ML: 0.05 INJECTION, SOLUTION INTRAVENOUS at 10:54

## 2024-04-26 RX ADMIN — PANTOPRAZOLE SODIUM 40 MG: 40 INJECTION, POWDER, FOR SOLUTION INTRAVENOUS at 09:07

## 2024-04-26 RX ADMIN — MIDAZOLAM HYDROCHLORIDE 2 MG: 1 INJECTION, SOLUTION INTRAMUSCULAR; INTRAVENOUS at 17:24

## 2024-04-26 RX ADMIN — SODIUM CHLORIDE, SODIUM LACTATE, POTASSIUM CHLORIDE, AND CALCIUM CHLORIDE: 600; 310; 30; 20 INJECTION, SOLUTION INTRAVENOUS at 10:13

## 2024-04-26 RX ADMIN — INSULIN LISPRO 4 UNITS: 100 INJECTION, SOLUTION INTRAVENOUS; SUBCUTANEOUS at 00:01

## 2024-04-26 RX ADMIN — GLYCOPYRROLATE 1 MG: 0.2 INJECTION INTRAMUSCULAR; INTRAVENOUS at 14:12

## 2024-04-26 RX ADMIN — SODIUM CHLORIDE, POTASSIUM CHLORIDE, SODIUM LACTATE AND CALCIUM CHLORIDE 5 ML/HR: 600; 310; 30; 20 INJECTION, SOLUTION INTRAVENOUS at 09:33

## 2024-04-26 RX ADMIN — NOREPINEPHRINE BITARTRATE 0.06 MCG/KG/MIN: 8 INJECTION, SOLUTION INTRAVENOUS at 00:01

## 2024-04-26 RX ADMIN — ALBUMIN HUMAN 25 G: 0.25 SOLUTION INTRAVENOUS at 04:03

## 2024-04-26 RX ADMIN — VASOPRESSIN 0.03 UNITS/MIN: 0.2 INJECTION INTRAVENOUS at 09:10

## 2024-04-26 ASSESSMENT — PAIN - FUNCTIONAL ASSESSMENT
PAIN_FUNCTIONAL_ASSESSMENT: CPOT (CRITICAL CARE PAIN OBSERVATION TOOL)

## 2024-04-26 NOTE — CONSULTS
[facility]  ACUTE CARE SURGERY - HISTORY AND PHYSICAL / CONSULT    Patient Name: Jason Hollins  MRN: [id number]  Admit Date: 305  : 1960  AGE: 63 y.o.   GENDER: male  ==============================================================================  TODAY'S ASSESSMENT AND PLAN OF CARE:  63 y.o. 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 Hawkins and Dr. Smith on 3/5/24. Post op course complicated by 3/20 cardiopulmonary arrest 2/2 PE cb shocked liver, hemorrhagic shock 2/2 bleeding at right thigh wound requiring return to OR, tracheostomy with revision for bleeding, wound infection, pancreatitis, RUTH requiring CVVH, DVT requiring IVC filter, MDRO cultures, persistent pressor requirements.     ==============================================================================  CHIEF COMPLAINT/REASON FOR CONSULT:  63 y.o. 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 Hawkins and Dr. Smith on 3/5/24. Post op course complicated by 3/20 cardiopulmonary arrest 2/2 PE cb shocked liver, hemorrhagic shock 2/2 bleeding at right thigh wound requiring return to OR, tracheostomy with revision for bleeding, wound infection, pancreatitis, RUTH requiring CVVH, DVT requiring IVC filter, MDRO cultures, persistent pressor requirements.     ACS consulted this AM for increasing lactate 7.2 and pressor requirements (levo 0.1 and vaso 0.03) and increasing abdominal distension. CT shows free air with pneumatosis intestinalis. Last CT  showing diffuse chest/body wall anasarca and diffuse abdominopelvic ascites.        PAST MEDICAL HISTORY:   PMH:   Past Medical History:   Diagnosis Date    Diabetes mellitus (Multi)     Hyperlipidemia     Morbid (severe) obesity due to excess calories (Multi) 05/31/2016    Severe obesity (BMI 35.0-39.9) with comorbidity         PSH:   Past Surgical History:   Procedure Laterality Date    CT GUIDED PERCUTANEOUS BIOPSY MUSCLE  11/13/2023    CT GUIDED PERCUTANEOUS BIOPSY MUSCLE 11/13/2023 [facility] CT    INVASIVE VASCULAR PROCEDURE N/A 3/20/2024    Procedure: Pulmonary Angiogram;  Surgeon: Axel Wolfe MD;  Location: [place] [id number] Cardiac Cath Lab;  Service: Cardiovascular;  Laterality: N/A;    OTHER SURGICAL HISTORY  05/31/2016    History Of Prior Surgery     FH:   Family History   Problem Relation Name Age of Onset    Coronary artery disease Mother      Diabetes Mother      Hypertension Mother      Cancer Father      Diabetes Sister      Diabetes Brother      Diabetes Other Grandmother      SOCIAL HISTORY:    Smoking:    Social History     Tobacco Use   Smoking Status Never   Smokeless Tobacco Never       Alcohol:    Social History     Substance and Sexual Activity   Alcohol Use Never       Drug use:     MEDICATIONS:   Prior to Admission medications    Medication Sig Start Date End Date Taking? Authorizing Provider   aspirin 81 mg EC tablet Take 1 tablet (81 mg) by mouth once daily. AS DIRECTED. 5/31/16  Yes Historical Provider, MD   blood sugar diagnostic (Blood Glucose Test) strip once daily. One Drop Test In Vitro Strip; Test   Yes Historical Provider, MD   chlorhexidine (Peridex) 0.12 % solution Use 15 mL in the mouth or throat see administration instructions for 2 doses. Use the night before and morning of surgery. 2/28/24  Yes RAQUEL Kellogg   dulaglutide (Trulicity) 4.5 mg/0.5 mL pen injector Inject 4.5 mg under the skin 1 (one) time per week. 9/15/23  Yes RAQUEL Gregory   dulaglutide (TRULICITY) 4.5 mg/0.5 mL pen injector INJECT ONE PEN (4.5 MG) UNDER THE SKIN ONCE WEEKLY  1/27/24 1/26/25 Yes RAQUEL Gregory   ergocalciferol (Vitamin D-2) 1.25 MG (70974 UT) capsule Take 1 capsule (50,000 Units) by mouth every 14 (fourteen) days. twice monthly on the 15th and the 30 th for vitamin d deficiency 10/11/23  Yes RAQUEL Gregory   fish oil concentrate (Omega-3) 120-180 mg capsule Take 1 capsule (1 g) by mouth.   Yes Historical Provider, MD   gabapentin (Neurontin) 300 mg capsule Use the 100 mg capsule to titrate to 300 mg , then use the 300 mg capsule and take 1 to 2 capsules hs for pain 1/7/24  Yes Basilia Durbin MD   ibuprofen 800 mg tablet Take 1 tablet (800 mg) by mouth 3 times a day as needed for mild pain (1 - 3) (pain). 10/11/23 10/10/24 Yes RAQUEL Gregory   metFORMIN (Glucophage) 1,000 mg tablet TAKE 1 TABLET BY MOUTH EVERY MORNING AND 1.5 TABLETS BY MOUTH EVERY EVENING , 10/11/23  Yes RAQUEL Gregory   multivitamin tablet Take 1 tablet by mouth once daily.   Yes Historical Provider, MD   atorvastatin (Lipitor) 40 mg tablet Take 1 tablet (40 mg) by mouth once daily. 6/30/23 12/27/23  RAQUEL Gregory   NIFEdipine ER (NIFEdipine XL) 30 mg 24 hr tablet Take 1 tablet (30 mg) by mouth once daily in the morning. Take before meals. Do not crush, chew, or split.  Patient not taking: Reported on 3/5/2024 12/19/23 12/18/24  Chiki Carbajal MD   OneTouch Ultra Test strip Test blood sugar once daily  Patient not taking: Reported on 3/5/2024 10/11/23   RAQUEL Gregory   sildenafil (Viagra) 100 mg tablet Take 1 tablet (100 mg) by mouth once daily as needed (1 hour before needed).  Patient not taking: Reported on 3/5/2024 10/11/23   RAQUEL Gregory   tiZANidine (Zanaflex) 4 mg capsule Take 1 capsule (4 mg) by mouth 3 times a day.  Patient not taking: Reported on 3/5/2024 10/11/23 10/10/24  PHILLIP Gregory-CNP     ALLERGIES:   No Known Allergies    REVIEW OF SYSTEMS:  Review of Systems  Negative except as  above    PHYSICAL EXAM:  Physical Exam  Constitutional: critically ill. Nods/shakes head and thumbs up to questions. Able to track.  ENMT: dobhoff in R nare, icteric sclera   Cardiovascular: on pressors  Respiratory/Thorax: Trach to vent  Gastrointestinal: massively distended, tender to palpation   Musculoskeletal: Able to squeeze bilateral hands  Extremities: Generalized pitting edema.  Posterior flap incision line and posterior thigh/gluteal region incision dressed with incisional wound vac, maintaining low continuous suction, no output in collecting canister. ANDERSON drain x 3 right upper leg dark red   Neurological: Off sedation, able to respond to commands with head nod and thumbs up  Skin: Edematous, jaundiced, warm.     IMAGING SUMMARY:  (summary of findings, not a copy of dictation)  pending final read     LABS:  Results from last 7 days   Lab Units 04/26/24 0309 04/26/24 0019 04/25/24  1801 04/21/24  1632 04/21/24  1241 04/19/24  1207 04/19/24  0559   WBC AUTO x10*3/uL 14.6* 13.9* 13.5*   < > 12.9*  12.9*   < > 10.6   HEMOGLOBIN g/dL 9.6* 8.8* 9.1*   < > 8.0*  8.0*   < > 9.0*   HEMATOCRIT % 29.1* 25.5* 28.1*   < > 23.2*  23.2*   < > 24.8*   PLATELETS AUTO x10*3/uL 57* 57* 66*   < > 164  164   < > 116*   LYMPHO PCT MAN %  --   --   --   --  2.5  --  0.8   MONO PCT MAN %  --   --   --   --  2.5  --  0.8   EOSINO PCT MAN %  --   --   --   --  0.0  --  0.0    < > = values in this interval not displayed.     Results from last 7 days   Lab Units 04/26/24 0309 04/26/24  0019 04/25/24  1801   APTT seconds 68* 64* 61*   INR  1.3* 1.3* 1.3*     Results from last 7 days   Lab Units 04/26/24  0019 04/25/24  1801 04/25/24  1248   SODIUM mmol/L 137 136 135*   POTASSIUM mmol/L 3.9 4.3 4.2   CHLORIDE mmol/L 100 98 99   CO2 mmol/L 21 23 22   BUN mg/dL 22 23 23   CREATININE mg/dL 0.76 0.79 0.81   CALCIUM mg/dL 9.1 8.8 8.6   PROTEIN TOTAL g/dL 5.5* 5.4* 5.3*   BILIRUBIN TOTAL mg/dL 28.0* 27.0* 26.6*   ALK PHOS U/L 144* 149*  164*   ALT U/L 17 16 17   AST U/L 110* 107* 106*   GLUCOSE mg/dL 161* 137* 144*     Results from last 7 days   Lab Units 04/26/24  0019 04/25/24  1801 04/25/24  1248   BILIRUBIN TOTAL mg/dL 28.0* 27.0* 26.6*   BILIRUBIN DIRECT mg/dL 19.2* 17.7* 17.7*     Results from last 7 days   Lab Units 04/26/24  0341 04/26/24  0308 04/26/24  0019   POCT PH, ARTERIAL pH 7.28* 7.30* 7.35*   POCT PCO2, ARTERIAL mm Hg 38 37* 39   POCT PO2, ARTERIAL mm Hg 91 94 86   POCT HCO3 CALCULATED, ARTERIAL mmol/L 17.9* 18.2* 21.5*   POCT BASE EXCESS, ARTERIAL mmol/L -8.2* -7.6* -3.8*         I have reviewed all laboratory and imaging results ordered/pertinent for this encounter.

## 2024-04-26 NOTE — PROGRESS NOTES
Jason Hollins is a 63 y.o. male on day 52 of admission presenting with Soft tissue sarcoma (Multi).    Subjective   Overnight, patient had significantly increasing pressor requirements, uptrending lactate (latest lactate 8.5), and acidosis (pH downtrending to 7.19). CT angio C/A/P notable for pneumatosis intestinalis and gastric perforation. Anticoagulation and tube feeds held. ACS consulted and decided for ex lap in OR.    OR course:  10L ascites removal   EBL: 600 cc  Crystalloid: 1 L LR  Colloid: 1 L 5% albumin, 50 cc 25% albumin  Units: 1 unit packed red blood cells, 5 pack platelets    Notably, in OR found to have ischemic colon and most of small intestines (dead tissue). Greater curvature of stomach also dead tissue with perforation. Patient was brought back to SICU care with fascia closed and wound VAC. Patient reversed with Neostigmine 5mg and Glycopyrrolate 0.4mg.     Family meeting was held with Dr. Cabrera (surgery), Dr. Moreno (SICU) where it was decided to transition to DNR status. Prognosis was explained to family that life saving surgical intervention is not possible. Prognosis is fatal and shared decision was made to continue care for family/close friends to visit patient. Will be shared decision when deciding about comfort care.       Objective     Physical Exam  Constitutional:       Appearance: He is ill-appearing and toxic-appearing.   Eyes:      Comments: Conjunctival icterus evident   Cardiovascular:      Rate and Rhythm: Regular rhythm. Tachycardia present.   Pulmonary:      Comments: Decreased breath sounds noted bilaterally  Abdominal:      Comments: Distended, rigid abdomen with diminished bowel sounds.   Musculoskeletal:         General: Swelling present.      Comments: Diffuse anasarca and pitting edema bilaterally in lower extremities   Skin:     General: Skin is warm.      Coloration: Skin is jaundiced.   Neurological:      Comments: In morning - patient alert and responsive to basic commands  "        Last Recorded Vitals  Blood pressure 116/73, pulse 101, temperature 35.8 °C (96.4 °F), temperature source Temporal, resp. rate (!) 0, height 1.778 m (5' 10\"), weight 122 kg (268 lb 8.3 oz), SpO2 97%.  Intake/Output last 3 Shifts:  I/O last 3 completed shifts:  In: 3935.6 (32.3 mL/kg) [I.V.:1040.6 (8.5 mL/kg); Blood:350; NG/GT:1245; IV Piggyback:1300]  Out: 1395 (11.5 mL/kg) [Urine:22 (0 mL/kg/hr); Drains:395; Other:978]  Weight: 121.8 kg     Relevant Results  Scheduled medications  albumin human, 25 g, intravenous, Once  fludrocortisone, 0.1 mg, nasogastric tube, q12h  [Held by provider] heparin (porcine), 5,000 Units, subcutaneous, q8h  hydrocortisone sodium succinate, 50 mg, intravenous, q6h  insulin glargine, 12 Units, subcutaneous, q12h  insulin lispro, 0-20 Units, subcutaneous, q4h  meropenem, 1 g, intravenous, q12h  midodrine, 20 mg, orogastric tube, q8h  oxygen, , inhalation, Continuous - Inhalation  pantoprazole, 40 mg, intravenous, BID  thiamine, 500 mg, nasogastric tube, q8h      Continuous medications  lactated Ringer's, 5 mL/hr, Last Rate: Stopped (04/25/24 2200)  norepinephrine, 0.01-1 mcg/kg/min (Dosing Weight), Last Rate: 0.13 mcg/kg/min (04/26/24 0700)  PrismaSol 4/2.5, 3,000 mL/hr, Last Rate: Stopped (04/26/24 0400)  vasopressin, 0.01-0.03 Units/min, Last Rate: 0.03 Units/min (04/26/24 0910)      PRN medications  PRN medications: acetaminophen, alteplase, alteplase, calcium gluconate, calcium gluconate, dextrose, glucagon, heparin, heparin, HYDROmorphone, HYDROmorphone, labetaloL, magnesium sulfate, magnesium sulfate, midodrine, sodium chloride  Results for orders placed or performed during the hospital encounter of 03/05/24 (from the past 24 hour(s))   POCT GLUCOSE   Result Value Ref Range    POCT Glucose 145 (H) 74 - 99 mg/dL   Magnesium   Result Value Ref Range    Magnesium 2.75 (H) 1.60 - 2.40 mg/dL   Hepatic function panel   Result Value Ref Range    Albumin 3.7 3.4 - 5.0 g/dL    " Bilirubin, Total 26.6 (H) 0.0 - 1.2 mg/dL    Bilirubin, Direct 17.7 (H) 0.0 - 0.3 mg/dL    Alkaline Phosphatase 164 (H) 33 - 136 U/L    ALT 17 10 - 52 U/L     (H) 9 - 39 U/L    Total Protein 5.3 (L) 6.4 - 8.2 g/dL   Coagulation Screen   Result Value Ref Range    Protime 14.3 (H) 9.8 - 12.8 seconds    INR 1.3 (H) 0.9 - 1.1    aPTT 55 (H) 27 - 38 seconds   CBC   Result Value Ref Range    WBC 14.8 (H) 4.4 - 11.3 x10*3/uL    nRBC 4.7 (H) 0.0 - 0.0 /100 WBCs    RBC 3.24 (L) 4.50 - 5.90 x10*6/uL    Hemoglobin 9.5 (L) 13.5 - 17.5 g/dL    Hematocrit 26.5 (L) 41.0 - 52.0 %    MCV 82 80 - 100 fL    MCH 29.3 26.0 - 34.0 pg    MCHC 35.8 32.0 - 36.0 g/dL    RDW 20.9 (H) 11.5 - 14.5 %    Platelets 74 (L) 150 - 450 x10*3/uL   Calcium, ionized   Result Value Ref Range    POCT Calcium, Ionized 1.16 1.1 - 1.33 mmol/L   Basic Metabolic Panel   Result Value Ref Range    Glucose 144 (H) 74 - 99 mg/dL    Sodium 135 (L) 136 - 145 mmol/L    Potassium 4.2 3.5 - 5.3 mmol/L    Chloride 99 98 - 107 mmol/L    Bicarbonate 22 21 - 32 mmol/L    Anion Gap 18 10 - 20 mmol/L    Urea Nitrogen 23 6 - 23 mg/dL    Creatinine 0.81 0.50 - 1.30 mg/dL    eGFR >90 >60 mL/min/1.73m*2    Calcium 8.6 8.6 - 10.6 mg/dL   Phosphorus   Result Value Ref Range    Phosphorus 2.3 (L) 2.5 - 4.9 mg/dL   POCT GLUCOSE   Result Value Ref Range    POCT Glucose 155 (H) 74 - 99 mg/dL   Magnesium   Result Value Ref Range    Magnesium 2.82 (H) 1.60 - 2.40 mg/dL   Hepatic function panel   Result Value Ref Range    Albumin 3.9 3.4 - 5.0 g/dL    Bilirubin, Total 27.0 (H) 0.0 - 1.2 mg/dL    Bilirubin, Direct 17.7 (H) 0.0 - 0.3 mg/dL    Alkaline Phosphatase 149 (H) 33 - 136 U/L    ALT 16 10 - 52 U/L     (H) 9 - 39 U/L    Total Protein 5.4 (L) 6.4 - 8.2 g/dL   Coagulation Screen   Result Value Ref Range    Protime 14.5 (H) 9.8 - 12.8 seconds    INR 1.3 (H) 0.9 - 1.1    aPTT 61 (H) 27 - 38 seconds   CBC   Result Value Ref Range    WBC 13.5 (H) 4.4 - 11.3 x10*3/uL    nRBC  4.6 (H) 0.0 - 0.0 /100 WBCs    RBC 3.23 (L) 4.50 - 5.90 x10*6/uL    Hemoglobin 9.1 (L) 13.5 - 17.5 g/dL    Hematocrit 28.1 (L) 41.0 - 52.0 %    MCV 87 80 - 100 fL    MCH 28.2 26.0 - 34.0 pg    MCHC 32.4 32.0 - 36.0 g/dL    RDW 22.4 (H) 11.5 - 14.5 %    Platelets 66 (L) 150 - 450 x10*3/uL   Calcium, ionized   Result Value Ref Range    POCT Calcium, Ionized 1.13 1.1 - 1.33 mmol/L   Basic Metabolic Panel   Result Value Ref Range    Glucose 137 (H) 74 - 99 mg/dL    Sodium 136 136 - 145 mmol/L    Potassium 4.3 3.5 - 5.3 mmol/L    Chloride 98 98 - 107 mmol/L    Bicarbonate 23 21 - 32 mmol/L    Anion Gap 19 10 - 20 mmol/L    Urea Nitrogen 23 6 - 23 mg/dL    Creatinine 0.79 0.50 - 1.30 mg/dL    eGFR >90 >60 mL/min/1.73m*2    Calcium 8.8 8.6 - 10.6 mg/dL   Phosphorus   Result Value Ref Range    Phosphorus 2.7 2.5 - 4.9 mg/dL   POCT GLUCOSE   Result Value Ref Range    POCT Glucose 150 (H) 74 - 99 mg/dL   POCT GLUCOSE   Result Value Ref Range    POCT Glucose 163 (H) 74 - 99 mg/dL   POCT GLUCOSE   Result Value Ref Range    POCT Glucose 152 (H) 74 - 99 mg/dL   Magnesium   Result Value Ref Range    Magnesium 2.72 (H) 1.60 - 2.40 mg/dL   Hepatic function panel   Result Value Ref Range    Albumin 3.9 3.4 - 5.0 g/dL    Bilirubin, Total 28.0 (H) 0.0 - 1.2 mg/dL    Bilirubin, Direct 19.2 (H) 0.0 - 0.3 mg/dL    Alkaline Phosphatase 144 (H) 33 - 136 U/L    ALT 17 10 - 52 U/L     (H) 9 - 39 U/L    Total Protein 5.5 (L) 6.4 - 8.2 g/dL   Coagulation Screen   Result Value Ref Range    Protime 14.7 (H) 9.8 - 12.8 seconds    INR 1.3 (H) 0.9 - 1.1    aPTT 64 (H) 27 - 38 seconds   CBC   Result Value Ref Range    WBC 13.9 (H) 4.4 - 11.3 x10*3/uL    nRBC 3.7 (H) 0.0 - 0.0 /100 WBCs    RBC 3.08 (L) 4.50 - 5.90 x10*6/uL    Hemoglobin 8.8 (L) 13.5 - 17.5 g/dL    Hematocrit 25.5 (L) 41.0 - 52.0 %    MCV 83 80 - 100 fL    MCH 28.6 26.0 - 34.0 pg    MCHC 34.5 32.0 - 36.0 g/dL    RDW 21.2 (H) 11.5 - 14.5 %    Platelets 57 (L) 150 - 450 x10*3/uL    Calcium, ionized   Result Value Ref Range    POCT Calcium, Ionized 1.21 1.1 - 1.33 mmol/L   Blood Gas Arterial Full Panel   Result Value Ref Range    POCT pH, Arterial 7.35 (L) 7.38 - 7.42 pH    POCT pCO2, Arterial 39 38 - 42 mm Hg    POCT pO2, Arterial 86 85 - 95 mm Hg    POCT SO2, Arterial 98 94 - 100 %    POCT Oxy Hemoglobin, Arterial 94.9 94.0 - 98.0 %    POCT Hematocrit Calculated, Arterial 28.0 (L) 41.0 - 52.0 %    POCT Sodium, Arterial 134 (L) 136 - 145 mmol/L    POCT Potassium, Arterial 4.0 3.5 - 5.3 mmol/L    POCT Chloride, Arterial 102 98 - 107 mmol/L    POCT Ionized Calcium, Arterial 1.24 1.10 - 1.33 mmol/L    POCT Glucose, Arterial 158 (H) 74 - 99 mg/dL    POCT Lactate, Arterial 3.9 (H) 0.4 - 2.0 mmol/L    POCT Base Excess, Arterial -3.8 (L) -2.0 - 3.0 mmol/L    POCT HCO3 Calculated, Arterial 21.5 (L) 22.0 - 26.0 mmol/L    POCT Hemoglobin, Arterial 9.4 (L) 13.5 - 17.5 g/dL    POCT Anion Gap, Arterial 15 10 - 25 mmo/L    Patient Temperature 37.0 degrees Celsius    FiO2 40 %   Basic Metabolic Panel   Result Value Ref Range    Glucose 161 (H) 74 - 99 mg/dL    Sodium 137 136 - 145 mmol/L    Potassium 3.9 3.5 - 5.3 mmol/L    Chloride 100 98 - 107 mmol/L    Bicarbonate 21 21 - 32 mmol/L    Anion Gap 20 10 - 20 mmol/L    Urea Nitrogen 22 6 - 23 mg/dL    Creatinine 0.76 0.50 - 1.30 mg/dL    eGFR >90 >60 mL/min/1.73m*2    Calcium 9.1 8.6 - 10.6 mg/dL   Phosphorus   Result Value Ref Range    Phosphorus 2.8 2.5 - 4.9 mg/dL   Blood Gas Arterial Full Panel   Result Value Ref Range    POCT pH, Arterial 7.30 (L) 7.38 - 7.42 pH    POCT pCO2, Arterial 37 (L) 38 - 42 mm Hg    POCT pO2, Arterial 94 85 - 95 mm Hg    POCT SO2, Arterial 99 94 - 100 %    POCT Oxy Hemoglobin, Arterial 95.4 94.0 - 98.0 %    POCT Hematocrit Calculated, Arterial 29.0 (L) 41.0 - 52.0 %    POCT Sodium, Arterial 133 (L) 136 - 145 mmol/L    POCT Potassium, Arterial 4.1 3.5 - 5.3 mmol/L    POCT Chloride, Arterial 101 98 - 107 mmol/L    POCT Ionized  Calcium, Arterial 1.25 1.10 - 1.33 mmol/L    POCT Glucose, Arterial 121 (H) 74 - 99 mg/dL    POCT Lactate, Arterial 6.7 (HH) 0.4 - 2.0 mmol/L    POCT Base Excess, Arterial -7.6 (L) -2.0 - 3.0 mmol/L    POCT HCO3 Calculated, Arterial 18.2 (L) 22.0 - 26.0 mmol/L    POCT Hemoglobin, Arterial 9.8 (L) 13.5 - 17.5 g/dL    POCT Anion Gap, Arterial 18 10 - 25 mmo/L    Patient Temperature 37.0 degrees Celsius    FiO2 40 %   CBC   Result Value Ref Range    WBC 14.6 (H) 4.4 - 11.3 x10*3/uL    nRBC 3.7 (H) 0.0 - 0.0 /100 WBCs    RBC 3.29 (L) 4.50 - 5.90 x10*6/uL    Hemoglobin 9.6 (L) 13.5 - 17.5 g/dL    Hematocrit 29.1 (L) 41.0 - 52.0 %    MCV 88 80 - 100 fL    MCH 29.2 26.0 - 34.0 pg    MCHC 33.0 32.0 - 36.0 g/dL    RDW 22.7 (H) 11.5 - 14.5 %    Platelets 57 (L) 150 - 450 x10*3/uL   Coagulation Screen   Result Value Ref Range    Protime 14.3 (H) 9.8 - 12.8 seconds    INR 1.3 (H) 0.9 - 1.1    aPTT 68 (H) 27 - 38 seconds   Prepare RBC: 1 Units   Result Value Ref Range    PRODUCT CODE W7687W84     Unit Number [id number]     Unit ABO O     Unit RH POS     XM INTEP COMP     Dispense Status XM     Blood Expiration Date May 18, 2024 23:59 EDT     PRODUCT BLOOD TYPE 5100     UNIT VOLUME 293    Prepare RBC: 4 Units   Result Value Ref Range    PRODUCT CODE J2853N10     Unit Number [id number]     Unit ABO O     Unit RH POS     XM INTEP COMP     Dispense Status XM     Blood Expiration Date May 15, 2024 23:59 EDT     PRODUCT BLOOD TYPE 5100     UNIT VOLUME 285    Prepare Platelets: 1 Units   Result Value Ref Range    PRODUCT CODE Y9885X85     Unit Number [id number]     Unit ABO A     Unit RH POS     Dispense Status RE     Blood Expiration Date April 26, 2024 23:59 EDT     PRODUCT BLOOD TYPE 6200     UNIT VOLUME 204    Prepare Plasma: 4 Units   Result Value Ref Range    PRODUCT CODE S5439T65     Unit Number [id number]     Unit ABO O     Unit RH POS     Dispense Status RE     Blood Expiration Date April 29, 2024 08:15 EDT      PRODUCT BLOOD TYPE 5100     UNIT VOLUME 218     PRODUCT CODE K8583C57     Unit Number [id number]     Unit ABO O     Unit RH POS     Dispense Status RE     Blood Expiration Date April 29, 2024 16:30 EDT     PRODUCT BLOOD TYPE 5100     UNIT VOLUME 229     PRODUCT CODE P5463G29     Unit Number [id number]     Unit ABO O     Unit RH NEG     Dispense Status RE     Blood Expiration Date April 28, 2024 14:18 EDT     PRODUCT BLOOD TYPE 9500     UNIT VOLUME 331     PRODUCT CODE S2410X03     Unit Number [id number]     Unit ABO O     Unit RH NEG     Dispense Status RE     Blood Expiration Date April 29, 2024 07:45 EDT     PRODUCT BLOOD TYPE 9500     UNIT VOLUME 260    Blood Gas Arterial Full Panel   Result Value Ref Range    POCT pH, Arterial 7.28 (L) 7.38 - 7.42 pH    POCT pCO2, Arterial 38 38 - 42 mm Hg    POCT pO2, Arterial 91 85 - 95 mm Hg    POCT SO2, Arterial 98 94 - 100 %    POCT Oxy Hemoglobin, Arterial 94.8 94.0 - 98.0 %    POCT Hematocrit Calculated, Arterial 30.0 (L) 41.0 - 52.0 %    POCT Sodium, Arterial      POCT Potassium, Arterial 4.0 3.5 - 5.3 mmol/L    POCT Chloride, Arterial 101 98 - 107 mmol/L    POCT Ionized Calcium, Arterial 1.29 1.10 - 1.33 mmol/L    POCT Glucose, Arterial 113 (H) 74 - 99 mg/dL    POCT Lactate, Arterial 7.2 (HH) 0.4 - 2.0 mmol/L    POCT Base Excess, Arterial -8.2 (L) -2.0 - 3.0 mmol/L    POCT HCO3 Calculated, Arterial 17.9 (L) 22.0 - 26.0 mmol/L    POCT Hemoglobin, Arterial 10.0 (L) 13.5 - 17.5 g/dL    POCT Anion Gap, Arterial      Patient Temperature 37.0 degrees Celsius    FiO2 40 %   POCT GLUCOSE   Result Value Ref Range    POCT Glucose 104 (H) 74 - 99 mg/dL   CBC   Result Value Ref Range    WBC 13.5 (H) 4.4 - 11.3 x10*3/uL    nRBC 4.9 (H) 0.0 - 0.0 /100 WBCs    RBC 3.05 (L) 4.50 - 5.90 x10*6/uL    Hemoglobin 8.9 (L) 13.5 - 17.5 g/dL    Hematocrit 27.3 (L) 41.0 - 52.0 %    MCV 90 80 - 100 fL    MCH 29.2 26.0 - 34.0 pg    MCHC 32.6 32.0 - 36.0 g/dL    RDW 22.7 (H) 11.5  - 14.5 %    Platelets 55 (L) 150 - 450 x10*3/uL   Blood Gas Arterial Full Panel   Result Value Ref Range    POCT pH, Arterial 7.22 (LL) 7.38 - 7.42 pH    POCT pCO2, Arterial 46 (H) 38 - 42 mm Hg    POCT pO2, Arterial 77 (L) 85 - 95 mm Hg    POCT SO2, Arterial 96 94 - 100 %    POCT Oxy Hemoglobin, Arterial 93.0 (L) 94.0 - 98.0 %    POCT Hematocrit Calculated, Arterial 28.0 (L) 41.0 - 52.0 %    POCT Sodium, Arterial 133 (L) 136 - 145 mmol/L    POCT Potassium, Arterial 4.1 3.5 - 5.3 mmol/L    POCT Chloride, Arterial 100 98 - 107 mmol/L    POCT Ionized Calcium, Arterial 1.24 1.10 - 1.33 mmol/L    POCT Glucose, Arterial 103 (H) 74 - 99 mg/dL    POCT Lactate, Arterial 7.2 (HH) 0.4 - 2.0 mmol/L    POCT Base Excess, Arterial -8.5 (L) -2.0 - 3.0 mmol/L    POCT HCO3 Calculated, Arterial 18.8 (L) 22.0 - 26.0 mmol/L    POCT Hemoglobin, Arterial 9.2 (L) 13.5 - 17.5 g/dL    POCT Anion Gap, Arterial 18 10 - 25 mmo/L    Patient Temperature 37.0 degrees Celsius    FiO2 40 %   POCT GLUCOSE   Result Value Ref Range    POCT Glucose 103 (H) 74 - 99 mg/dL   Blood Gas Arterial Full Panel   Result Value Ref Range    POCT pH, Arterial 7.19 (LL) 7.38 - 7.42 pH    POCT pCO2, Arterial 44 (H) 38 - 42 mm Hg    POCT pO2, Arterial 76 (L) 85 - 95 mm Hg    POCT SO2, Arterial 96 94 - 100 %    POCT Oxy Hemoglobin, Arterial 92.8 (L) 94.0 - 98.0 %    POCT Hematocrit Calculated, Arterial 29.0 (L) 41.0 - 52.0 %    POCT Sodium, Arterial 131 (L) 136 - 145 mmol/L    POCT Potassium, Arterial 4.3 3.5 - 5.3 mmol/L    POCT Chloride, Arterial 101 98 - 107 mmol/L    POCT Ionized Calcium, Arterial 1.22 1.10 - 1.33 mmol/L    POCT Glucose, Arterial 90 74 - 99 mg/dL    POCT Lactate, Arterial 8.5 (HH) 0.4 - 2.0 mmol/L    POCT Base Excess, Arterial -10.8 (L) -2.0 - 3.0 mmol/L    POCT HCO3 Calculated, Arterial 16.8 (L) 22.0 - 26.0 mmol/L    POCT Hemoglobin, Arterial 9.8 (L) 13.5 - 17.5 g/dL    POCT Anion Gap, Arterial 18 10 - 25 mmo/L    Patient Temperature 37.0 degrees  Celsius    FiO2 40 %   CBC   Result Value Ref Range    WBC 14.8 (H) 4.4 - 11.3 x10*3/uL    nRBC 5.7 (H) 0.0 - 0.0 /100 WBCs    RBC 3.38 (L) 4.50 - 5.90 x10*6/uL    Hemoglobin 9.5 (L) 13.5 - 17.5 g/dL    Hematocrit 28.9 (L) 41.0 - 52.0 %    MCV 86 80 - 100 fL    MCH 28.1 26.0 - 34.0 pg    MCHC 32.9 32.0 - 36.0 g/dL    RDW 22.5 (H) 11.5 - 14.5 %    Platelets 57 (L) 150 - 450 x10*3/uL                      Assessment/Plan   Principal Problem:    Soft tissue sarcoma (Multi)  Active Problems:    Acute kidney injury (nontraumatic) (CMS-HCC)    Pulmonary embolus (Multi)    Anemia    Thrombocytopenia (CMS-HCC)    Current chronic use of systemic steroids    Gastroesophageal reflux disease without esophagitis    Extraskeletal myxoid chondrosarcoma (Multi)    Cardiopulmonary arrest (Multi)    Acute respiratory failure with hypoxia (Multi)    Tracheostomy hemorrhage (Multi)    Postoperative wound breakdown, initial encounter    Postoperative wound breakdown, sequela    Hematoma    Gastric perforation, acute    Jason Hollins is a 63 y.o. 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 Hawkins and Dr. Smith on 3/5/24.  Post operative course was uncomplicated and patient was on RNF until 3/20 for prolonged bed rest to avoid hip flexion to maintain flap integrity.  Patient was on prophylactic lovenox while on bedrest.      3/20: Cardiopulmonary arrest s/p CPR with ROSC after TPA, overnight started on heparin, epo, increased pressor requirements, increased swelling at flap site.      3/21: Hemorrhagic shock, MTP. Firm and large right flap site. Return to OR s/p Exploration of right thigh wound, Evacuation of hematoma. Suture ligation of multiple bleeding vessel and several points in gluteal area.      4/1: s/p Trach     4/4: return OR with ENT  s/p laryngoscopy, esophagoscopy and bronchoscopy, trach revision. Found extensive clot burden in esophagus and stomach as well as in the lungs. Oozing at thyroid bed cauterized. Trach exchanged to new 6.0 prox XLT shiley. OR findings: blood up glottis and from thyroid bed to airway.     4/9: Patient is medically stable for OR on 4/11/24.  He is appropriately n.p.o. since midnight and has had more DVT prophylaxis held for OR today.     4/18: Patient bleeding into RLE given increased ANDERSON drain output and non-incrementing Hgb despite transfusion. Return to OR for washout.      4/21: - patient more somnolent, increasing WBC, Temp 99.7, Lactate 2.9 this afternoon, and increasing pressor requirements: sending blood culture x 2, resumed vancomycin, reengaged ID, and returned hydrocortisone dosing to q6hr, giving unit PRBC    4/26: Developed increasing pressure req, acidosis, elevated lactate, pneumatosis intestinalis with gastric perforation. Ex lap indicated dead tissue in colon and large part of small intestines. Fatal prognosis has been discussed with family. Patient is DNR and treatment is continuing for family/close friends to visit today. Discussed with family that patient may decompensate while on support. Will be shared decision with family when to pursue any withdrawal of care/comfort care.     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 Hawkins and Dr. Smith on 3/5/24 s/p cardiopulmonary arrest with ROSC 3/20. CT head 3/22 without acute process. Weaned off cisatracurium and propofol overnight 3/23-3/24. Ketamine weaned off 3/25 and Fentanyl weaned off 3/26 am. Repeat CT Head 3/26 without acute process. MRI Brain 3/30, negative for cerebral infarction or hemorrhage. Neuro exam - following commands except for RLE,  tracking, nodding/shaking head to questions.  CT head on 4/22 that was unremarkable.  - Prioritizing patient comfort and pain control as discussed with family   - Ordered fentanyl gtt for pain/comfort  - ongoing neuro and pain assessments  -Continue thiamine 500 mg TID   - PRN dilaudid for pain control   - PT/OT -> AROM     CV: History of HTN, HLD. Acute cardiopulmonary arrest 2/2 to possible massive PE vs massive STEMI, received multiple rounds of CPR and one defibrillation.  Sustained ROSC achieved after TPA bolus. On high dose levophed, epinephrine, vaso, angioT2. Plan on 3/21 was for ECMO which was aborted secondary to large hemhorrge at right flap site. Had MTP and taken OR with 5L evacuated. ECHO 3/21: Normal LV, Mod enlarged RV, slight suggestion of a Townsend's sign / RV strain, low normal RV function. ECHO 3/22 with hyperdynamic LV. New onset Afib with RVR overnight 3/22-3/23, dosed with 150mg amio x2, started infusion at 1mg/min thereafter. AT2 weaned off. Amio infusion discontinued 3/25. Lactate downtrending. Vaso and epi weaned off. Remains in NSR. iEpo weaned off 3/27. Repeat TTE 3/29 and 4/2 essentially unchanged. Levo resumed 4/2 evening to continue aggressive fluid removal. Repeat TTE on 4/10 with LVEF 65-70% and RV difficulty to assess. Weaned off levo 4/13. Marginal hypotension 4/20 . Overnight his blood pressure was soft and norepinephrine drip increased to 0.1 and was given 100 cc of 25% albumin.  Fluid removal from CVVH decreased to 20 mL/h. Given acute changes on 4/26 as described above, currently on  norepi 0.42 and Vaso 0.03  - Continue vasopressor support while family is visiting. If needing significant escalation, will prioritize discussion for comfort care again  - continuous EKG/abp monitoring  - continue midodrine 20mg q8h   - Continue florinef 0.1mg BID   - continue hydrocortisone 50 mg q6hr  - Will continue BP support while family meets with patient.         PULM: Arrived to SICU  intubated julio c-CPR. Concern for massive PE. Started on iEpo, currently on 0.05. Hypoxic respiratory failure. Severe ARDS. ETT exchanged 3/23. CT Chest 3/26 with interval JOHN consolidative/ground glass opacity. S/p Tracheostomy on 4/1. S/p trach revision on 4/4. CXR with bilateraly pulmonary edema and pleural effusions R>L.  Currently on SIMV and 40% of FiO2.  - additional bronchial hygiene as indicated for comfort      ENT: Had epistaxis 3/23, completed afrin bid x3 days. S/p trach on 4/1. Bleeding from trach site 4/2 am, packing placed by ENT. Repeat episode of bloody tracheal secretions 4/3 through this am. Taken back to OR with ENT 4/4 s/p trach revision. Noted to have greenish discharge around his tracheostomy on 4/22 and ENT saw the patient.   - Culture ordered for drainage around of the trach>> grew mixed gram positive bacteria and Klebsiella  - ENT following  - wound care following     GI: NPO. Shock liver, pancreatitis. Elevated TG. Elevated lactate. Consulted ACS for potential bowel ischemia as cause of persistent lactic acidosis. CT imaging 3/22 with evidence of pancreatitis. No acute surgical indication per ACS. RUQ US 3/22 with thickening of GB wall without cholelithiasis. CT A/P 3/26 negative for acute bleed. LFTs downtrending. Worsening hyperbilirubinemia. Amylase and lipase now WNL. Repeat RUQ US 4/1 with nonspecific gallbladder wall edema and thickening which may be 2/2 generalized fluid overload, mild hepatomegaly with slight nodular contour and c/f steatosis and fibrosis, irregular hypoechoic region adjacent to hepatic veins. US liver with doppler 4/2 revealed hepatomegaly with nodular contour, mildly elevated RI in main and left hepatic arteries. Hyperbilirubinemia.KUB xray didn't show any signs of bowel obstructions or perforations.  Had 3 BM ON (was reddish, brownish and mucusy in nature).   - Holding TF  - PPI daily for GI prophylaxis        : No history of renal disease. Baseline creatinine  0.6. Anuric RUTH. Elevated CK, downtrending. Metabolic acidosis, total body fluid overload, hyperkalemia. Started on CVVH 3/21, stopped bicarb infusion 3/22. Marino removed 3/24. Hyponatremia resolved. Stopped CVVH 4/2.  Has been tolerating iHD. Net +ve 1467. CVVH restarted on 4/18 given hemorrhage and takeback to OR. Now on CVVH and today we stopped pulling out fluid given his BP status. Net negative 2.6 L for past 24hrs.  - Nephrology following, appreciate recs  - Holding CVVH     HEME: Patient was on ppx lovenox for DVT prophylaxis prior to cardiopulmonary arrest. Concern for massive PE patient received bolus of TPA during CPR. Started on heparin infusion overnight given concern for PE. Hemorrhagic shock 3/21, MTP and back to OR s/p Exploration of right thigh woundEvacuation of hematoma. Suture ligation of multiple bleeding vessel and several points in gluteal area. Hematology consulted 3/22 to r/o HLH. Transfused 1 RBC overnight 3/22-3/23. Thrombocytopenia, coagulapathy, hypofibrinogenemia. LE Duplex 3/25 +acute occlusive R soleal DVT. Received 2u PRBC and 1u FFP on 3/26. Heparin infusion discontinued 3/26 to r/o HIT and transitioned to bival. PF4 negative, resumed heparin infuision 3/27. Elevated IL2R and decreased NK function. C/f HLH (low suspicion), empirically treated with decadron (3/28-3/31). Thrombocytopenia on 3/30 to 18k, received 5pk, did not increment. Heparin held. Thrombocytopenia likely 2/2 to consumptive process, hematology following. Received IVIG and 5pk plts 3/31. Pancytopenia persists, improving thrombocytopenia. On 4/18 increased bleeding noted into RLE. Blood transfusion ongoing and Hep gtt held. On 4/22  DVT study of UE and LE that showed acute occlusive deep vein thrombosis visualized in the right soleal vein and acute occlusive superficial venous thrombosis visualized in the mid right cephalic vein.  He is on prophylactic subQ heparin and IVC filter was placed on 4/23.   - Holding all  anticoagulation  - Plastics aware of situation and fatal prognosis  - close surveillance of RLE and drains          ENDO: History of DM2. A1c 7.5%. TSH WNL 1/2024. Previously hyperglycemic and Now his feeding tube is resumed and will monitor his blood glucose closely.   - Monitor glucose PRN      ID: On broad antimicrobial therapy. MRSA screen + 2/28/24. Pan cultures sent 3/22. Sputum NTF, +MRSA screen (completed 5 day course mupirocin), UCx and BCx negative. Completed 7 day course vanc/zosyn 3/27. Febrile to 39.1 4/3, resent blood cultures and BAL and started vanco and zosyn. Switched zosyn to reynaldo, kept on vanco, added john. Blood cultures and sputum with Klebsiella. Repeat blood cultures sent 4/4 (neg final). Wound culture 4/11 +MDRO Klebsiella aerogenes (S meropenem). Urine culture 4/12 negative. MRSA negative 4/18. Started on Vancomycin and Micafungin on 4/21. Blood cultures NGTD and also Culture from his trach drainage grew mixed gram positive bacteria and klebsiella.  Had an elevated WBC 14 today we will continue to trend it but probably it is stress related given his increase vasopressor requirement.   - temp q4h  - continue meropenem   - wound care saw the patient today, appreciate recs  - Vancomycin  stopped 4/21-4/23 per ID recs  - Micafungin stopped (started on 4/21 and stopped on 4/24)  - ongoing monitoring for s/s infection     Lines:  - LIJ trialysis (4/18, SICU)  - left radial arterial line (4/5, SICU)  - PIV x2    Fatal prognosis has been discussed with family. Patient is DNR and treatment is continuing for family/close friends to visit today. Discussed with family that patient may decompensate while on support. Will be shared decision with family when to pursue any withdrawal of care/comfort care.    Patient seen and discussed with Dr. Moreno.    Mariluz Dailey, PGY-1  SICU Team

## 2024-04-26 NOTE — NURSING NOTE
0245  - Noticed patients gown was saturated by the neck. Observed and the trach site was oozing out tube feed and clear fluid. Notified RT and SICU team Resident.  - Tube feed turned off.   - Patient requiring increased pressors. From 0.09mcg-0.13mcg - see flowsheet for titration details.   - Patient was being cleaned while turned to the right side trach site started to ooze more and started to bleed, red with mix of tube feed and mucous. ENT was contacted by SICU team.  0445  - Pt. Received 1 unit of PRBC prophylactic.   -ENT cauterized trach site. RT at bedside.   0500 - Pt. To CT scan - for increased abdominal distention.   - Family at bed side. ACS team spoke to family and findings of CT and surgical needs per CT findings.   0600 - Pt started to ooze red and tan secretions from mouth and nose. Surgical team and SICU team notified.  0700 - Patient planned to go surgery today.

## 2024-04-26 NOTE — SIGNIFICANT EVENT
ENT Clinical Event Note:    ENT called for episode of emesis with tube feeds coming from stoma and with new bleeding from trach. Of note patient is with recent  mental status change now increasingly obtunded with peritonitic abdomen requiring max pressors and MTP. RT notes tube feeds were suctioned from trach stoma but all tracheal suction contents through trach were clear.     On arrival of ENT, cuff pressure noted to be 35. Tracheoscopy and  flexible laryngoscopy performed transglottic showing no obvious TEF but with gastric contents sitting above  trach cuff, suctioned. Bronchoscopy performed showing no evidence of bleeding or tube feeds in distal trachea. The cuff pressure was then decreased to 24 by RT with continued good sats on the ventilator and  without leak.     Next the stoma was examined which showed evidence of minimal oozing from L skin edge. This was cauterized with silver   nitrate with good hemostasis.          -Defer remainder of care to ICU in the setting of acute decompensation  - Do not overinflate trach cuff, recommend  cuff pressures <20  - Please page with any questions or concerns    Rosales Garcia MD  PGY-2  ENT: [id number]

## 2024-04-26 NOTE — NURSING NOTE
0745 - pt's MAP 57, increased levo from 0.13 to 0.16  0800 - pt's MAP 55 during turn to clean up small light red liquid/mucous filled BM (SICU resident Mariluz Dailey MD by bedside), increased levo to 0.17. Pt started to drain light red tube-feed like liquid from nostrils and trache site. RT called to room, suctioned and inflated cuff. SICU team notified (Giels Gibson, C-NP). ABG & CBC collected and sent. Q2h turns held until pt returns from OR, as per SICU team.  0830 - notified Dr. Mariluz CORTES) of pt's increasing levo requirements, MAP 56, increased levo to 0.23. Plan for surgery with ACS today in the afternoon.  0915 - pt's  and pt NPO for OR, standing lantus held.  0930 - pt's MAP 56. Levo increased to 0.32. Dr. Mariluz Dailey, SICU resident aware.  1012 - albumin 25g ordered and given to OR staff to administer. Pt left for OR for possible ex lap, bowel resection, ostomy creation and abdominal closure. OR staff (& Dr. Cabrera) spoke with and updated family about plan for OR.  1315 - pt back from OR, SICU and ACS team to discuss goals of care during family meeting. Titrated levo to 0.34 for MAP 49 while repositioning pt.  1330 - increased levo to 0.36 for MAP 52. Pt's code status now DNR.  1340 - increased levo to 0.38 for MAP 54  1345 - increased levo to 0.4 for MAP 56  1355 - increased levo to 0.42 for MAP 59  1440 - increased levo to 0.46 for MAP 53. Pt's family in room.  1720 - Pt now DNR - comfort care, per family. Order placed to terminally extubate and provide sedation   1725 - 2 mg of versed given IVP  1730 - RT disconnected pt's trache from vent  1732 - levophed turned off  1747 - pt pronounced by Dr. Krunal Moreno & Dr. Mariluz Dailey  1800 - Mortician was called and notified of pt status. Will be up shortly. Told writer to leave pt as is. Mortician will verify that pt is not a 's case.  1830 - pt's family waiting in conference room. Awaiting mortician's arrival.   1855 - Mortician arrived  and is speaking with family. Awaiting .  1910 - Gave report to night nurse, Reta Bower RN.

## 2024-04-26 NOTE — ANESTHESIA PREPROCEDURE EVALUATION
Patient: Jason Hollins    Procedure Information       Anesthesia Start Date/Time: 04/26/24 1013    Procedure: Exploration Laparotomy, possible bowel resection, possible ostomy, possible temporary abdominal closure (Abdomen)    Location: [facility] OR  / [facility] OR    Surgeons: Michael Cabrera, DO            Relevant Problems   Cardiac   (+) Cardiopulmonary arrest (Multi)   (+) Hyperlipidemia      Pulmonary   (+) Pulmonary embolus (Multi)      GI   (+) Gastroesophageal reflux disease without esophagitis      /Renal   (+) Acute kidney injury (nontraumatic) (CMS-[facility])      Endocrine   (+) Class 2 obesity with body mass index (BMI) of 38.0 to 38.9 in adult   (+) Current chronic use of systemic steroids   (+) DM type 2 without retinopathy (Multi)   (+) Diabetes mellitus type 2 in obese   (+) Severe obesity (BMI 35.0-39.9) with comorbidity (Multi)   (+) Type 2 diabetes mellitus with hyperglycemia (Multi)      Hematology   (+) Anemia   (+) Thrombocytopenia (CMS-HCC)      ID   (+) Candidal intertrigo   (+) Viral illness       Clinical information reviewed:   Tobacco  Allergies  Meds  Problems  Med Hx  Surg Hx   Fam Hx  Soc   Hx        NPO Detail:  No data recorded     PHYSICAL EXAM    Anesthesia Plan    History of general anesthesia?: yes  History of complications of general anesthesia?: unknown/emergency    ASA 4 - emergent     general     Use of blood products discussed with who consented to blood products.    Plan discussed with CAA.

## 2024-04-26 NOTE — BRIEF OP NOTE
Date: 2024  OR Location: [facility] OR    Name: Jason Hollins : 1960, Age: 63 y.o., MRN: [id number], Sex: male    Diagnosis  Pre-op Diagnosis     * Gastric perforation, acute [K31.89] Post-op Diagnosis     * Gastric perforation, acute [K31.89]     Procedures  Exploration Laparotomy  57592 - DC EXPLORATORY LAPAROTOMY CELIOTOMY W/WO BIOPSY SPX      Surgeons      * Michael Cabrera - Primary    Resident/Fellow/Other Assistant:  Surgeons and Role:  * No surgeons found with a matching role *  Uvaldo Maxwell DO    Procedure Summary  Anesthesia: General  ASA: ASA status not filed in the log.  Anesthesia Staff: Anesthesiologist: Kai Burgess DO  C-AA: GEETHA Saul  Estimated Blood Loss: 600mL  Intra-op Medications:   Administrations occurring from 0954 to 1339 on 24:   Medication Name Total Dose   sodium chloride 0.9 % irrigation solution 3,000 mL   acetaminophen (Tylenol) oral liquid 650 mg Cannot be calculated   albumin human 5 % infusion 25 g Cannot be calculated   albumin human 5 % infusion 25 g Cannot be calculated   alteplase (Cathflo Activase) injection 2 mg Cannot be calculated   alteplase (Cathflo Activase) injection 2 mg Cannot be calculated   calcium gluconate in NS IV 1 g Cannot be calculated   calcium gluconate in NS IV 2 g Cannot be calculated   dextrose 50 % injection 12.5 g Cannot be calculated   fludrocortisone (Florinef) tablet 0.1 mg Cannot be calculated   glucagon (Glucagen) injection 1 mg Cannot be calculated   heparin (porcine) injection 5,000 Units Cannot be calculated   heparin 1,000 unit/mL injection 1,000 Units Cannot be calculated   heparin 1,000 unit/mL injection 1,000 Units Cannot be calculated   hydrocortisone sod succ (PF) (Solu-CORTEF) injection 50 mg Cannot be calculated   HYDROmorphone (Dilaudid) injection 0.2 mg Cannot be calculated   HYDROmorphone (Dilaudid) injection 0.4 mg Cannot be calculated   insulin glargine (Lantus) injection 12 Units Cannot be calculated   insulin  lispro (HumaLOG) injection 0-20 Units Cannot be calculated   labetaloL (Normodyne,Trandate) injection 10 mg Cannot be calculated   magnesium sulfate IV 2 g Cannot be calculated   magnesium sulfate IV 4 g Cannot be calculated   meropenem (Merrem) in sodium chloride 0.9 % 100 mL IV 1 g Cannot be calculated   midodrine (Proamatine) tablet 10 mg Cannot be calculated   midodrine (Proamatine) tablet 20 mg Cannot be calculated   pantoprazole (ProtoNix) injection 40 mg Cannot be calculated   sodium chloride (Ocean) 0.65 % nasal spray 1 spray Cannot be calculated   thiamine (Vitamin B-1) tablet 500 mg Cannot be calculated              Anesthesia Record               Intraprocedure I/O Totals          Intake    Transfuse Platelets : 579.00 mL    Transfuse RBC :30 Minutes 350.00 mL    LR bolus 1000.00 mL    albumin human 25 % 50.00 mL    Total Intake 1979 mL       Output    Est. Blood Loss 600 mL    Other 9800 mL    Total Output 79558 mL       Net    Net Volume -8421 mL          Specimen: No specimens collected     Staff:   Circulator: Tucker Smith RN  Relief Circulator: Kacy Mendoza RN  Scrub Person: Brittaney Lozano RN; Pepin Clemente          Findings: Large gastric perforation with ~10L bilious ascites on abdominal entry. Significantly ischemic small and large bowel in almost its entirety. Intraoperative conversation with family was had due to dismal prognosis.    Complications:   No surgical complications      Disposition: ICU - intubated and critically ill.  Condition: unstable  Specimens Collected: No specimens collected  Attending Attestation:     Michael Cabrera  Phone Number: [phone]

## 2024-04-26 NOTE — SIGNIFICANT EVENT
Team was alerted by patient's nurse of change in hemodynamics. Of note, patient was made DNR comfort care with decision by family to withdraw supportive care. Upon arrival at bedside, patient rhythm was noted to be asystole. Flat arterial line tracing was visible. Patient was examined with family at bedside. Dr. Moreno (SICU attending) was aware and at bedside. Upon auscultation, no heart or lung sounds were noted. Patient did not respond to noxious stimulus or have palpable pulse. Pupils were examined and were fixed and dilated. Time of death was pronounced at 1747. Family was at bedside. Surgical team was notified.     Mariluz Dailey, PGY-1

## 2024-04-26 NOTE — PROGRESS NOTES
Occupational Therapy    Communication Note    Missed Visit: Yes  Missed Visit Reason:  (0909: pt discussed during rounds. Pt pending OR for gastric perforation, not appropriate for OT tx at this time.)      04/26/24 at 9:11 AM   Christelle Garces, OT   Rehab Office: [phone]

## 2024-04-26 NOTE — PROGRESS NOTES
"Jason Hollins is a 63 y.o. male on day 52 of admission presenting with Soft tissue sarcoma (Multi).  Had some tube feeds from trach and bleeding overnight.    Subjective   Patient went to the OR for an ex-lap, per trauma almost all bowel was ischemia. Pt made DNR. No reported bleeding from trach.       Objective     Physical Exam    Vitals reviewed  General: opens eyes, pt severely jaundiced  Resp: Breathing comfortably on vent  Head: Atraumatic, normocephalic  Oral Cavity: MMM  Ears: normal external ears  Neck: 6-0 p XLT shiley in place with balloon inflated. No audible leak on vent      Last Recorded Vitals  Blood pressure 116/73, pulse 101, temperature 35.8 °C (96.4 °F), temperature source Temporal, resp. rate (!) 0, height 1.778 m (5' 10\"), weight 122 kg (268 lb 8.3 oz), SpO2 97%.  Intake/Output last 3 Shifts:  I/O last 3 completed shifts:  In: 3935.6 (32.3 mL/kg) [I.V.:1040.6 (8.5 mL/kg); Blood:350; NG/GT:1245; IV Piggyback:1300]  Out: 1395 (11.5 mL/kg) [Urine:22 (0 mL/kg/hr); Drains:395; Other:978]  Weight: 121.8 kg     Relevant Results        Scheduled medications  albumin human, 25 g, intravenous, Once  albumin human, 25 g, intravenous, Once  fentaNYL, 25 mcg, intravenous, Once  fludrocortisone, 0.1 mg, nasogastric tube, q12h  hydrocortisone sodium succinate, 50 mg, intravenous, q6h  insulin glargine, 12 Units, subcutaneous, q12h  insulin lispro, 0-20 Units, subcutaneous, q4h  meropenem, 1 g, intravenous, q12h  oxygen, , inhalation, Continuous - Inhalation  pantoprazole, 40 mg, intravenous, BID  thiamine, 500 mg, nasogastric tube, q8h      Continuous medications  fentaNYL, 0-300 mcg/hr, Last Rate: 25 mcg/hr (04/26/24 1347)  lactated Ringer's, 5 mL/hr, Last Rate: 5 mL/hr (04/26/24 0917)  norepinephrine, 0.01-1 mcg/kg/min (Dosing Weight), Last Rate: 0.13 mcg/kg/min (04/26/24 0700)  PrismaSol 4/2.5, 3,000 mL/hr, Last Rate: Stopped (04/26/24 0400)  vasopressin, 0.01-0.03 Units/min, Last Rate: 0.03 Units/min " (04/26/24 6039)      PRN medications  PRN medications: acetaminophen, alteplase, alteplase, calcium gluconate, calcium gluconate, dextrose, fentaNYL, glucagon, [Transfer Hold] heparin, [Transfer Hold] heparin, HYDROmorphone, HYDROmorphone, labetaloL, magnesium sulfate, magnesium sulfate, midodrine, sodium chloride       This patient currently has cardiac telemetry ordered; if you would like to modify or discontinue the telemetry order, click here to go to the orders activity to modify/discontinue the order.                 Assessment/Plan   Principal Problem:    Soft tissue sarcoma (Multi)  Active Problems:    Acute kidney injury (nontraumatic) (CMS-HCC)    Pulmonary embolus (Multi)    Anemia    Thrombocytopenia (CMS-HCC)    Current chronic use of systemic steroids    Gastroesophageal reflux disease without esophagitis    Extraskeletal myxoid chondrosarcoma (Multi)    Cardiopulmonary arrest (Multi)    Acute respiratory failure with hypoxia (Multi)    Tracheostomy hemorrhage (Multi)    Postoperative wound breakdown, initial encounter    Postoperative wound breakdown, sequela    Hematoma    Gastric perforation, acute    Plan:  - Do not overinflate trach cuff, recommend  cuff pressures <20  - Please page with any questions or concerns           Osvaldo Lea MD PGY5  Otolaryngology - Head & Neck Surgery  [facility]  ENT Consult Pager: [phone]

## 2024-04-26 NOTE — SIGNIFICANT EVENT
Plan of Care Note    Patient well known to SICU service, currently in prolonged ICU course following PEA arrest on regular nursing floor, ongoing ventilator support, continuous renal replacement therapies, antibiotics for multi-site infection. Over the course of the evening, the patient had an increase in vasopressor requirements, with ABG demonstrating lactic acidosis. Bladder pressures concerning for abdominal compartment syndrome. The patient underwent urgent abdominal imaging which was concerning for a perforated stomach and pneumatosis intestinalis. ACS was consulted, and following the family's consent, the patient underwent exploratory laparotomy. During the procedure, it was noted that the stomach, colon, and considerable length of the small bowel were frankly ischemic/necrotic. A family meeting was held with the patient's wife and other family members. Dr. Cabrera of [facility] communicated to the family that the injury was unsurvivable, and no further interventions could correct his current course. I emphasized that given the lack of any treatable conditions, his prognosis was fatal. Given this, the patient was made DNR. I stated that no escalation of therapies such as CPR, cardioversion/defibrillation, further invasive procedures, CVVH would promote his survival but would likely prolong his potential for suffering/discomfort. We discussed that vasopressor doses could be temporarily adjusted to allow family visitation, but ultimately the patient may  despite this intervention. They were in agreement. This was relayed to the nursing team, and appropriate orders in the end of life order set were initiated.    Josh LEWIS  SICU Staff  P. [phone]

## 2024-04-26 NOTE — ANESTHESIA POSTPROCEDURE EVALUATION
Patient: Jason Hollins    Procedure Summary       Date: 04/26/24 Room / Location: [facility] OR 11 / [facility] OR    Anesthesia Start: 1013 Anesthesia Stop: 1321    Procedure: Exploration Laparotomy (Abdomen) Diagnosis:       Gastric perforation, acute      (Gastric perforation, acute [K31.89])    Surgeons: Michael Cabrera DO Responsible Provider: Kai Burgess DO    Anesthesia Type: general ASA Status: 4 - Emergent            Anesthesia Type: general    Vitals Value Taken Time   BP 88/58 04/26/24 1321   Temp 36 04/26/24 1321   Pulse 104 04/26/24 1321   Resp 18 04/26/24 1321   SpO2 97 04/26/24 1321       Anesthesia Post Evaluation    Patient location during evaluation: ICU  Patient participation: complete - patient cannot participate  Level of consciousness: sedated  Pain management: adequate  Airway patency: patent  Cardiovascular status: acceptable  Respiratory status: ventilator  Hydration status: acceptable  Postoperative Nausea and Vomiting: none      No notable events documented.

## 2024-04-27 LAB
BLOOD EXPIRATION DATE: NORMAL
BLOOD EXPIRATION DATE: NORMAL
DISPENSE STATUS: NORMAL
DISPENSE STATUS: NORMAL
PRODUCT BLOOD TYPE: 6200
PRODUCT BLOOD TYPE: 6200
PRODUCT CODE: NORMAL
PRODUCT CODE: NORMAL
UNIT ABO: NORMAL
UNIT ABO: NORMAL
UNIT NUMBER: NORMAL
UNIT NUMBER: NORMAL
UNIT RH: NORMAL
UNIT RH: NORMAL
UNIT VOLUME: 203
UNIT VOLUME: 318

## 2024-04-28 LAB
BLOOD EXPIRATION DATE: NORMAL
BLOOD EXPIRATION DATE: NORMAL
DISPENSE STATUS: NORMAL
DISPENSE STATUS: NORMAL
PRODUCT BLOOD TYPE: 5100
PRODUCT BLOOD TYPE: 5100
PRODUCT CODE: NORMAL
PRODUCT CODE: NORMAL
UNIT ABO: NORMAL
UNIT ABO: NORMAL
UNIT NUMBER: NORMAL
UNIT NUMBER: NORMAL
UNIT RH: NORMAL
UNIT RH: NORMAL
UNIT VOLUME: 285
UNIT VOLUME: 293
XM INTEP: NORMAL
XM INTEP: NORMAL

## 2024-04-29 LAB
FUNGUS SPEC CULT: NORMAL
FUNGUS SPEC FUNGUS STN: NORMAL

## 2024-04-29 NOTE — OP NOTE
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]     * Postoperative wound breakdown, sequela [T81.31XS]     * Hematoma [T14.8XXA]     Procedures  Incision and Drainage Hip, Closure, Incisional Wound Vac Placement  81259 - LA I&D HEMATOMA SEROMA/FLUID COLLECTION    Evacuation Hematoma Hip  38958 - LA I&D HEMATOMA SEROMA/FLUID COLLECTION      Surgeons      * Angy Smith - Primary    Resident/Fellow/Other Assistant:  Surgeons and Role:     * PHILLIP Baez-CNP - Resident - Assisting    Procedure Summary  Anesthesia: General  ASA: IV  Anesthesia Staff: Anesthesiologist: Palmira Chris MD  Anesthesia Resident: Rosmery Pérez MD; Chay Pham MD  Estimated Blood Loss: 780mL  Intra-op Medications:   Administrations occurring from 1900 to 2100 on 24:   Medication Name Total Dose   sodium chloride 0.9 % irrigation solution 1,000 mL   acetaminophen (Tylenol) oral liquid 650 mg Cannot be calculated   albumin human 25 % solution 25 g Cannot be calculated   alteplase (Cathflo Activase) injection 2 mg Cannot be calculated   alteplase (Cathflo Activase) injection 2 mg Cannot be calculated   calcium gluconate in NS IV 1 g Cannot be calculated   calcium gluconate in NS IV 2 g Cannot be calculated   dextrose 50 % injection 12.5 g Cannot be calculated   fludrocortisone (Florinef) tablet 0.1 mg Cannot be calculated   glucagon (Glucagen) injection 1 mg Cannot be calculated   heparin 1,000 unit/mL injection 1,000 Units Cannot be calculated   heparin 1,000 unit/mL injection  1,000 Units Cannot be calculated   hydrocortisone sod succ (PF) (Solu-CORTEF) injection 50 mg Cannot be calculated   insulin glargine (Lantus) injection 12 Units Cannot be calculated   insulin lispro (HumaLOG) injection 0-20 Units Cannot be calculated   labetaloL (Normodyne,Trandate) injection 10 mg Cannot be calculated   magnesium sulfate IV 2 g Cannot be calculated   magnesium sulfate IV 4 g Cannot be calculated   meropenem (Merrem) in sodium chloride 0.9 % 100 mL IV 1 g 100 mL   midodrine (Proamatine) tablet 10 mg Cannot be calculated   midodrine (Proamatine) tablet 10 mg Cannot be calculated   oxyCODONE (Roxicodone) immediate release tablet 10 mg Cannot be calculated   oxyCODONE (Roxicodone) immediate release tablet 5 mg Cannot be calculated   oxygen (O2) therapy Cannot be calculated   pantoprazole (ProtoNix) injection 40 mg Cannot be calculated   sodium chloride (Ocean) 0.65 % nasal spray 1 spray Cannot be calculated              Anesthesia Record               Intraprocedure I/O Totals          Intake    Transfuse Platelets : 579.00 mL    Transfuse RBC :30 Minutes 350.00 mL    LR bolus 2000.00 mL    albumin human 25 % 50.00 mL    Total Intake 2979 mL       Output    Urine 50 mL    Est. Blood Loss 600 mL    Other 9800 mL    Total Output 71884 mL       Net    Net Volume -7471 mL          Specimen: No specimens collected     Staff:   Circulator: Mikal Campbell RN; Amanda Smith RN  Relief Circulator: Verito Lanza RN  Relief Scrub: Poppy Carias  Scrub Person: Junior Martinez         Drains and/or Catheters:   Closed/Suction Drain 2 Proximal;Right;Anterior Thigh Bulb 19 Fr. (Active)   Site Description Healing 04/26/24 1600   Dressing Status Clean;Dry;Occlusive 04/26/24 1600   Drainage Appearance Dark red 04/26/24 1600   Status To bulb suction 04/26/24 1600   Sutures Removed Intact No (Comment) 04/24/24 2000   Number of Sutures Removed 0 04/11/24 0400   Output (mL) 5 mL 04/26/24 1600   Intake (ml) 40 ml 04/23/24  0000       Closed/Suction Drain 1 Right;Anterior Thigh Bulb 15 Fr. (Active)   Site Description Healing 04/26/24 1600   Dressing Status Clean;Dry;Occlusive 04/26/24 1600   Drainage Appearance Dark red 04/26/24 1600   Status To bulb suction 04/26/24 1600   Sutures Removed Intact No (Comment) 04/06/24 2100   Output (mL) 5 mL 04/26/24 1600   Intake (ml) 10 ml 04/23/24 0000       Closed/Suction Drain 3 Proximal;Right;Anterior Thigh (Active)   Site Description Healing 04/26/24 1600   Dressing Status Clean;Dry;Occlusive 04/26/24 1600   Drainage Appearance Dark red 04/26/24 1600   Status To bulb suction 04/26/24 1600   Sutures Removed Intact No (Comment) 04/06/24 2100   Number of Sutures Removed 5 04/26/24 0800   Output (mL) 10 mL 04/26/24 1600   Intake (ml) 0 ml 04/26/24 0000       Closed/Suction Drain Superior;Medial Abdomen  (Active)   Site Description Bleeding 04/26/24 1600   Dressing Status Occlusive;Other (Comment) 04/26/24 1600   Drainage Appearance None 04/26/24 1600   Output (mL) 0 mL 04/26/24 1600       NG/OG/Feeding Tube Other (Comment) Right nostril (Active)   Tube Status Clamped 04/26/24 1600   Placement Verification Measurements 04/26/24 1600   Distal Tube Measurement 65 cm 04/26/24 1600   Site Assessment Clean;Intact;Dry 04/26/24 1600   Drainage Appearance None 04/26/24 1600   NG/OG Interventions Irrigated 04/26/24 0049   Irrigant Tap water 04/26/24 0049   Response To Intervention No resistance met 04/26/24 0049   Tube Securement Nasal bridle 04/26/24 1600   Tube Feeding Frequency Continuous 04/26/24 0000   Tube Feeding Pivot 1.5 04/26/24 0000   Tube Feeding Strength Full strength 04/26/24 0000   Tube Feeding Method Continuous per pump 04/26/24 0000   Tube Feeding Rate (ml/hr) 45 mL/hr 04/25/24 2000   Tube Feeding Bag Changed Yes 04/26/24 0000   Feeding Tube Flushed With Tap water 04/26/24 0049   Intake (mL) 0 mL 04/26/24 0245   Intake - Flush (mL) 40 mL 04/26/24 0300   Output (mL) 45 mL 04/11/24 1600    Gastric Aspirate - Residual Discarded 300 mL 24 1430       Urethral Catheter 16 Fr. (Active)   Site Assessment Clean;Skin intact 24 1600   Collection Container Standard drainage bag 24 1600   Securement Method Securing device (Describe) 24 1600   Reason for Continuing Urinary Catheterization accurate hourly measurement of urine volume in a critically ill patient that cannot be assessed by other volumes and urine collection strategies 24 1600   Output (mL) 0 mL 24 1800           Findings: Generalized oozing from the gluteal area, with not identifiable bleeder.      Indications: Jason Hollins is an 63 y.o. male who is having surgery for hematoma evacuation, and bleeding check/control.      Informed Consent:  The patient was seen in the preoperative area. The risks, benefits, complications, treatment options, non-operative alternatives, expected recovery and outcomes were discussed with the patient's wife. The possibilities of reaction to medication, pulmonary aspiration, pulmonary embolism, deep vein thrombosis, cardiac complications and cardiac arrest, injury to surrounding structures, bleeding and hematoma, seroma, recurrent infection, partial or total flap necrosis,  the need for additional procedures, failure to diagnose a condition, and creating a complication requiring transfusion or operation were discussed with the patient. The patient concurred with the proposed plan, giving informed consent.  The site of surgery was properly noted/marked if necessary per policy. The patient has been actively warmed in preoperative area. Preoperative antibiotics have been ordered and given within 1 hours of incision. Venous thrombosis prophylaxis have been ordered including bilateral sequential compression devices    Procedure Details: The patient was brought to operating theatre on his bed. As soon as he entered the room, safety huddle was performed, and we verified patient's ID, MRN, ,  planned procedure, and surgical site, and we reviewed allergies history, and need for antibiotics. All in room were in agreement. Patient was then transferred to OR table, positioned supine with arms at 90 degree with trunk, supported with arm boards. He was held securely with safety belt, and all bony prominences were well padded to avoid pressure sores. SCDs were applied to both legs. General anesthesia was administered by anesthesia personnel. Next, he was changed into relaxed lateral position, and held secured in place. Next, the surgical site was prepped with betadine paint, and draped in standard fashion.   Time out was then performed and all in the room were in agreement. He was given antibiotics before incision.     The gluteal segment of the wound, and the inferior flap-gluteal wound were incised open down to the level of the muscles. Using 10 blade knife. Good healing was observed when the wounds were incised open. Cell saver was instantly utilized to evacuate the hematoma and reuse some of the patient own blood. After that, the surgical site where ablation was performed was exposed, and the flap was also elevated. I proceeded with identifying the sciatic nerve, which I further dissected and performed neurolysis to relive it from compression by clots and to avoid injury when attempting to control bleeding.  No identifiable bleeder was appreciated despite meticulous exam, however, generalized oozing was noted. Bipolar was used liberally to buzz bleeding points, the wound was then irrigated with copious amounts of saline, I also used sugicel powder and gelfoam to aid in bleeding control. The wound was then closed in layered with 0 PDS for superficial fascia repair, 2/0 PDS for deep dermal repair, 3/0 Nylon for skin closure. Finally, incisional wound VAC was applied.     Complications:  None; patient tolerated the procedure well.     Disposition: PACU - hemodynamically stable.    Condition: stable     Post OP  Orders: Global care by ICU team. Please hold therapeutic heparin. Monitor drains and wound VAC. May discuss alternative options with IVC use instead of therapeutic heparin due tot he risk of bleeding.     Attending Attestation: A qualified resident physician was not available.    Angy Smith  Phone Number: [phone]

## 2024-04-29 NOTE — OP NOTE
[facility] - Operative Report  Name: Jason Hollins   MRN: [id number]  Date of Procedure: 4/1/2024  Location: [facility]    Attending Surgeon: Osman Perkins     Resident/Fellow: Ed Hannon    Preoperative Diagnosis:  Respiratory failure    Postoperative Diagnosis:  Same    Operative indications:  This patient is in respiratory failure he is in need of a tracheotomy.  He had to be brought to the operating room because of difficult landmarks.    Procedure:  Tracheotomy, flexible bronchoscopy  Operative procedure:  The patient is left in his intensive care bed.  He is under general anesthesia per our colleagues in anesthesia.  He is properly positioned.  The neck was prepped and draped in usual sterile fashion.  A moderate vertical incision was outlined.  The incision was made and carried through the lying soft tissue.  The larynx was pulled superiorly.  When the landmarks could be found the technique was switched over to a percutaneous technique.  The bronchoscope was inserted through the endotracheal tube to the tip of the tube.  The area to place the tracheotomy was determined by manual pressure over the trachea.  The trocar and catheter were then inserted.  The guidewire was then inserted.  The first dilator was utilized.  The larger dilator and the guide were then inserted.  The tracheotomy tube was then inserted over the guide and the guidewire.  The position of the tube was verified with the bronchoscope and was found to be adequate.  The tube was secured to the neck with the sutures and ties.  The procedure was then terminated and the patient was sent back to the intensive care unit.  This note was dictated on 4/29/2024.  It seems as though the note that was dictated on 4/1/2024 cannot be retrieved.    I was present for the entire procedure    Osman Perkins MD

## 2024-04-30 NOTE — SIGNIFICANT EVENT
The patient is a patient of mine, and well known to me. He has sustained PEA arrest on regular nursing dee, and he was resuscitated and been in prolonged ICU course, with ongoing ventilator support, continuous renal replacement therapies, and antibiotics for multi-site infection. In the morning, I was notified by ICU team of the need for exploratory laparotomy following ACS consult and family's consent. This was based on the urgent abdominal imaging which was concerning for a perforated stomach and pneumatosis intestinalis. The patient underwent the exploratory laparotomy with Dr. Cabrera, and it was noted that the stomach, colon, and considerable length of the small bowel were frankly ischemic/necrotic. This was unsurvivable injury. A family meeting was held with the patient's wife and other family members, Dr. Cabrera of ACS, and Dr. Moreno of ICU, and after sharing the laparotomy findings and discussing prognosis, the patient was made DNR after family's agreement. I agreed with the interpretation of the exploratory lapratomy findings and the fatal prognosis. No escalation of therapies such as CPR, cardioversion/defibrillation, further invasive procedures, CVVH would promote his survival, per Dr. Moreno, but would likely prolong his potential for suffering/discomfort. I agreed with that. The patient  on the same day in the evening. Death was announced at 1747. I was notified by the ICU team. .     Angy Serar M.D.

## 2024-04-30 NOTE — OP NOTE
Wound Debridement w/wo Flap Closure Leg (R) Operative Note     Date: 2024  OR Location: [facility] OR    Name: Jason Hollins : 1960, Age: 63 y.o., MRN: [id number], Sex: male    Diagnosis  Pre-op Diagnosis     * Postoperative wound breakdown, sequela [T81.31XS] Post-op Diagnosis     * Postoperative wound breakdown, sequela [T81.31XS]     Procedures  Wound Debridement w/wo Flap Closure Leg  97331 - CO DEBRIDEMENT SUBCUTANEOUS TISSUE 1ST 20 SQ CM/<    Secondary closure of complicated surgical wound.   Surgeons      * Angy Smith - Primary    Resident/Fellow/Other Assistant:  Surgeons and Role:     * Gracie Smith PA-C - Resident - Assisting    Procedure Summary  Anesthesia: General  ASA: IV  Anesthesia Staff: Anesthesiologist: Socrates Patrick MD  CRNA: ZOHREH Walters  SRNA: ZOHREH Centeno  Estimated Blood Loss: 5mL  Intra-op Medications:   Administrations occurring from 1135 to 1320 on 24:   Medication Name Total Dose   alteplase (Cathflo Activase) injection 2 mg Cannot be calculated   calcium gluconate in NS IV 1 g Cannot be calculated   calcium gluconate in NS IV 2 g Cannot be calculated   fludrocortisone (Florinef) tablet 0.1 mg 0.1 mg   hydrocortisone sod succ (PF) (Solu-CORTEF) injection 50 mg Cannot be calculated   insulin lispro (HumaLOG) injection 0-20 Units Cannot be calculated   lactated Ringer's infusion Cannot be calculated   magnesium sulfate IV 2 g Cannot be calculated   magnesium sulfate IV 4 g Cannot be calculated   meropenem (Merrem) in sodium chloride 0.9 % 100 mL IV 1 g 100 mL   midodrine (Proamatine) tablet 20 mg Cannot be calculated   oxygen (O2) therapy Cannot be calculated   pantoprazole (ProtoNix) injection 40 mg Cannot be calculated   sodium chloride (Ocean) 0.65 % nasal spray 1 spray Cannot be calculated              Anesthesia Record               Intraprocedure I/O Totals          Intake    Transfuse Platelets : 579.00 mL    Transfuse RBC :30  Minutes 350.00 mL    LR bolus 2000.00 mL    albumin human 25 % 50.00 mL    Total Intake 2979 mL       Output    Urine 50 mL    Est. Blood Loss 600 mL    Other 9800 mL    Total Output 20518 mL       Net    Net Volume -7471 mL          Specimen:   ID Type Source Tests Collected by Time   A : Deep Wound Culure Left Glute Tissue ABSCESS FUNGAL CULTURE/SMEAR, TISSUE/WOUND CULTURE/SMEAR Angy Smith MD 4/11/2024 1324        Staff:   Circulator: Asher Olvera RN  Relief Circulator: Mikal Campbell RN; Amanda Smith RN; Nehal Escudero RN  Scrub Person: Constanza Gonzales RN; Edmundo Miller; Tawana Ryan RN         Drains and/or Catheters:   Closed/Suction Drain 2 Proximal;Right;Anterior Thigh Bulb 19 Fr. (Active)   Site Description Healing 04/26/24 1600   Dressing Status Clean;Dry;Occlusive 04/26/24 1600   Drainage Appearance Dark red 04/26/24 1600   Status To bulb suction 04/26/24 1600   Sutures Removed Intact No (Comment) 04/24/24 2000   Number of Sutures Removed 0 04/11/24 0400   Output (mL) 5 mL 04/26/24 1600   Intake (ml) 40 ml 04/23/24 0000       Closed/Suction Drain 1 Right;Anterior Thigh Bulb 15 Fr. (Active)   Site Description Healing 04/26/24 1600   Dressing Status Clean;Dry;Occlusive 04/26/24 1600   Drainage Appearance Dark red 04/26/24 1600   Status To bulb suction 04/26/24 1600   Sutures Removed Intact No (Comment) 04/06/24 2100   Output (mL) 5 mL 04/26/24 1600   Intake (ml) 10 ml 04/23/24 0000       Closed/Suction Drain 3 Proximal;Right;Anterior Thigh (Active)   Site Description Healing 04/26/24 1600   Dressing Status Clean;Dry;Occlusive 04/26/24 1600   Drainage Appearance Dark red 04/26/24 1600   Status To bulb suction 04/26/24 1600   Sutures Removed Intact No (Comment) 04/06/24 2100   Number of Sutures Removed 5 04/26/24 0800   Output (mL) 10 mL 04/26/24 1600   Intake (ml) 0 ml 04/26/24 0000       Closed/Suction Drain Superior;Medial Abdomen  (Active)   Site Description Bleeding 04/26/24 1600    Dressing Status Occlusive;Other (Comment) 04/26/24 1600   Drainage Appearance None 04/26/24 1600   Output (mL) 0 mL 04/26/24 1600       NG/OG/Feeding Tube Other (Comment) Right nostril (Active)   Tube Status Clamped 04/26/24 1600   Placement Verification Measurements 04/26/24 1600   Distal Tube Measurement 65 cm 04/26/24 1600   Site Assessment Clean;Intact;Dry 04/26/24 1600   Drainage Appearance None 04/26/24 1600   NG/OG Interventions Irrigated 04/26/24 0049   Irrigant Tap water 04/26/24 0049   Response To Intervention No resistance met 04/26/24 0049   Tube Securement Nasal bridle 04/26/24 1600   Tube Feeding Frequency Continuous 04/26/24 0000   Tube Feeding Pivot 1.5 04/26/24 0000   Tube Feeding Strength Full strength 04/26/24 0000   Tube Feeding Method Continuous per pump 04/26/24 0000   Tube Feeding Rate (ml/hr) 45 mL/hr 04/25/24 2000   Tube Feeding Bag Changed Yes 04/26/24 0000   Feeding Tube Flushed With Tap water 04/26/24 0049   Intake (mL) 0 mL 04/26/24 0245   Intake - Flush (mL) 40 mL 04/26/24 0300   Output (mL) 45 mL 04/11/24 1600   Gastric Aspirate - Residual Discarded 300 mL 04/03/24 1430       Urethral Catheter 16 Fr. (Active)   Site Assessment Clean;Skin intact 04/26/24 1600   Collection Container Standard drainage bag 04/26/24 1600   Securement Method Securing device (Describe) 04/26/24 1600   Reason for Continuing Urinary Catheterization accurate hourly measurement of urine volume in a critically ill patient that cannot be assessed by other volumes and urine collection strategies 04/26/24 1600   Output (mL) 0 mL 04/26/24 1800         Findings: Approximately 9 x 5 cm segment of necrotic eschar to posterior right gluteal region, adjacent to pedicle flap recipient edge.     Indications: Jason Hollins is an 63 y.o. male who is having surgery for Postoperative wound breakdown, sequela [T81.31XS].         Informed Consent: The patient was seen in the preoperative area. The risks, benefits, complications,  treatment options, non-operative alternatives, expected recovery and outcomes were discussed with the patient's wife. The possibilities of reaction to medication, pulmonary aspiration, pulmonary embolism, deep vein thrombosis, cardiac complications including arrest, injury to surrounding structures, bleeding and hematoma, recurrent infection, partial flap necrosis and wound healing issues, the need for additional procedures, failure to diagnose a condition, and creating a complication requiring transfusion or operation were discussed with the patient. The patient's wife concurred with the proposed plan, giving informed consent.  The site of surgery was properly noted/marked per policy. The patient has been actively warmed in preoperative area. Preoperative antibiotics have been ordered and given within 1 hours of incision. Venous thrombosis prophylaxis have been ordered including bilateral sequential compression devices    Procedure Details:   The patient was transferred from the ICU to OR on his bed, and as soon as he entered the room, safety huddle was performed to review and confirm patient's name, MRN, , medical and physical history update, the site of surgery, the procedure planned, allergies, need for antibiotics, DVT prophylaxis. All in the room were in agreement. The patient was then placed supine on the operating table, held secured in place by strap, all bony prominences were well padded to prevent pressure sores. SCDs were applied to both legs. General anesthesia was administered by anesthesia personnel. He then was changed to lateral position, and held secured. The surgical site was prepped with betadine paint, and draped in sterile standard fashion. Time out was performed and he was given antibiotics before incision.     Tissue necrosis was very well demarcated. I performed excisional debridement of the eschar and necrotic tissue down to and including subcutaneous tissue. Culture was obtained.The  wound was then irrigated copiously with NS. The wound was closed in layers using 0 PDS for superficial fascial repair, 2/0 PDS for deep dermal closure, and 3/0 Nylon for skin closure. INCISIONAL WOUND vac WAS THEN APPLIED.   Complications:  None; patient tolerated the procedure well.      Disposition: PACU - hemodynamically stable.    Condition: stable     Post OP Orders:  Global care by ICU team. Monitor the wound VAC and drains. Wound VAC to stay 2 weeks. Follow on culture. DVT prophylaxis can be resumed as early as 6-8 hours post procedure.     Attending Attestation: A qualified resident physician was not available. Gracie Smith served as my first assistant.     Angy Smith  Phone Number: [phone]

## 2024-05-01 ENCOUNTER — APPOINTMENT (OUTPATIENT)
Dept: HEMATOLOGY/ONCOLOGY | Facility: CLINIC | Age: 64
End: 2024-05-01
Payer: COMMERCIAL

## 2024-05-01 NOTE — OP NOTE
Exploration Laparotomy Operative Note     Date: 2024  OR Location: [facility] OR    Name: Jason Hollins YOB: 1960, Age: 63 y.o., MRN: [id number], Sex: male    Diagnosis  Pre-op Diagnosis     * Gastric perforation, acute [K31.89] Post-op Diagnosis     * Gastric perforation, acute [K31.89]     Procedures  Laparotomy, opening of the lesser sac    Surgeons      * Michael Dunn Primary    Resident/Fellow/Other Assistant:  Surgeons and Role:  * No surgeons found with a matching role *    Procedure Summary  Anesthesia: General  ASA: IV  Anesthesia Staff: Anesthesiologist: DO BETTINA Barone-AA: GEETHA Saul  Estimated Blood Loss: 600mL  Intra-op Medications:   Administrations occurring from 0954 to 1339 on 24:   Medication Name Total Dose   sodium chloride 0.9 % irrigation solution 3,000 mL   acetaminophen (Tylenol) oral liquid 650 mg Cannot be calculated   albumin human 5 % infusion 25 g Cannot be calculated   albumin human 5 % infusion 25 g Cannot be calculated   alteplase (Cathflo Activase) injection 2 mg Cannot be calculated   alteplase (Cathflo Activase) injection 2 mg Cannot be calculated   calcium gluconate in NS IV 1 g Cannot be calculated   calcium gluconate in NS IV 2 g Cannot be calculated   dextrose 50 % injection 12.5 g Cannot be calculated   fludrocortisone (Florinef) tablet 0.1 mg Cannot be calculated   glucagon (Glucagen) injection 1 mg Cannot be calculated   heparin (porcine) injection 5,000 Units Cannot be calculated   heparin 1,000 unit/mL injection 1,000 Units Cannot be calculated   heparin 1,000 unit/mL injection 1,000 Units Cannot be calculated   hydrocortisone sod succ (PF) (Solu-CORTEF) injection 50 mg Cannot be calculated   HYDROmorphone (Dilaudid) injection 0.2 mg Cannot be calculated   HYDROmorphone (Dilaudid) injection 0.4 mg Cannot be calculated   insulin glargine (Lantus) injection 12 Units Cannot be calculated   insulin lispro (HumaLOG) injection 0-20 Units Cannot be  calculated   labetaloL (Normodyne,Trandate) injection 10 mg Cannot be calculated   magnesium sulfate IV 2 g Cannot be calculated   magnesium sulfate IV 4 g Cannot be calculated   meropenem (Merrem) in sodium chloride 0.9 % 100 mL IV 1 g Cannot be calculated   midodrine (Proamatine) tablet 10 mg Cannot be calculated   midodrine (Proamatine) tablet 20 mg Cannot be calculated   norepinephrine (Levophed) 8 mg in dextrose 5% 250 mL (0.032 mg/mL) infusion (premix) 0.84 mg   pantoprazole (ProtoNix) injection 40 mg Cannot be calculated   sodium chloride (Ocean) 0.65 % nasal spray 1 spray Cannot be calculated   thiamine (Vitamin B-1) tablet 500 mg Cannot be calculated              Anesthesia Record               Intraprocedure I/O Totals          Intake    Transfuse Platelets : 579.00 mL    Transfuse RBC :30 Minutes 350.00 mL    LR bolus 2000.00 mL    albumin human 25 % 50.00 mL    Total Intake 2979 mL       Output    Urine 50 mL    Est. Blood Loss 600 mL    Other 9800 mL    Total Output 74613 mL       Net    Net Volume -7471 mL          Specimen: No specimens collected     Staff:   Circulator: Tucker Smith RN  Relief Circulator: Kacy Mendoza RN  Scrub Person: Brittaney Lozano RN; [place]         Drains and/or Catheters:   Closed/Suction Drain 2 Proximal;Right;Anterior Thigh Bulb 19 Fr. (Active)   Site Description Healing 04/26/24 1600   Dressing Status Clean;Dry;Occlusive 04/26/24 1600   Drainage Appearance Dark red 04/26/24 1600   Status To bulb suction 04/26/24 1600   Sutures Removed Intact No (Comment) 04/24/24 2000   Number of Sutures Removed 0 04/11/24 0400   Output (mL) 5 mL 04/26/24 1600   Intake (ml) 40 ml 04/23/24 0000       Closed/Suction Drain 1 Right;Anterior Thigh Bulb 15 Fr. (Active)   Site Description Healing 04/26/24 1600   Dressing Status Clean;Dry;Occlusive 04/26/24 1600   Drainage Appearance Dark red 04/26/24 1600   Status To bulb suction 04/26/24 1600   Sutures Removed Intact No (Comment) 04/06/24  2100   Output (mL) 5 mL 04/26/24 1600   Intake (ml) 10 ml 04/23/24 0000       Closed/Suction Drain 3 Proximal;Right;Anterior Thigh (Active)   Site Description Healing 04/26/24 1600   Dressing Status Clean;Dry;Occlusive 04/26/24 1600   Drainage Appearance Dark red 04/26/24 1600   Status To bulb suction 04/26/24 1600   Sutures Removed Intact No (Comment) 04/06/24 2100   Number of Sutures Removed 5 04/26/24 0800   Output (mL) 10 mL 04/26/24 1600   Intake (ml) 0 ml 04/26/24 0000       Closed/Suction Drain Superior;Medial Abdomen  (Active)   Site Description Bleeding 04/26/24 1600   Dressing Status Occlusive;Other (Comment) 04/26/24 1600   Drainage Appearance None 04/26/24 1600   Output (mL) 0 mL 04/26/24 1600       NG/OG/Feeding Tube Other (Comment) Right nostril (Active)   Tube Status Clamped 04/26/24 1600   Placement Verification Measurements 04/26/24 1600   Distal Tube Measurement 65 cm 04/26/24 1600   Site Assessment Clean;Intact;Dry 04/26/24 1600   Drainage Appearance None 04/26/24 1600   NG/OG Interventions Irrigated 04/26/24 0049   Irrigant Tap water 04/26/24 0049   Response To Intervention No resistance met 04/26/24 0049   Tube Securement Nasal bridle 04/26/24 1600   Tube Feeding Frequency Continuous 04/26/24 0000   Tube Feeding Pivot 1.5 04/26/24 0000   Tube Feeding Strength Full strength 04/26/24 0000   Tube Feeding Method Continuous per pump 04/26/24 0000   Tube Feeding Rate (ml/hr) 45 mL/hr 04/25/24 2000   Tube Feeding Bag Changed Yes 04/26/24 0000   Feeding Tube Flushed With Tap water 04/26/24 0049   Intake (mL) 0 mL 04/26/24 0245   Intake - Flush (mL) 40 mL 04/26/24 0300   Output (mL) 45 mL 04/11/24 1600   Gastric Aspirate - Residual Discarded 300 mL 04/03/24 1430       Urethral Catheter 16 Fr. (Active)   Site Assessment Clean;Skin intact 04/26/24 1600   Collection Container Standard drainage bag 04/26/24 1600   Securement Method Securing device (Describe) 04/26/24 1600   Reason for Continuing Urinary  Catheterization accurate hourly measurement of urine volume in a critically ill patient that cannot be assessed by other volumes and urine collection strategies 04/26/24 1600   Output (mL) 0 mL 04/26/24 1800       [REMOVED] Closed/Suction Drain Right;Medial Knee Bulb 19 Fr. (Removed)   Site Description Healing 03/16/24 0900   Dressing Status Clean;Dry 03/16/24 0900   Drainage Appearance Serosanguineous 03/16/24 0845   Status To bulb suction 03/16/24 0845   Output (mL) 3 mL 03/16/24 1238       [REMOVED] Closed/Suction Drain Right;Lateral Knee 19 Fr. (Removed)   Site Description Healing 03/16/24 0914   Dressing Status Clean;Dry 03/16/24 0914   Drainage Appearance Serosanguineous 03/16/24 0914   Status To bulb suction 03/16/24 0914   Output (mL) 0 mL 03/16/24 1238       [REMOVED] Closed/Suction Drain Right;Medial;Superior Buttock Bulb 19 Fr. (Removed)   Site Description Healing 03/16/24 0914   Dressing Status Clean;Dry 03/16/24 0914   Drainage Appearance Serosanguineous 03/16/24 0914   Status To bulb suction 03/16/24 0914   Output (mL) 10 mL 03/16/24 1238       [REMOVED] Closed/Suction Drain Right Buttock Bulb 19 Fr. (Removed)   Site Description Healing 03/16/24 0914   Dressing Status Clean;Dry 03/16/24 0914   Drainage Appearance Serosanguineous 03/16/24 0914   Status To bulb suction 03/16/24 0914   Output (mL) 5 mL 03/16/24 1238       [REMOVED] NG/OG/Feeding Tube Center mouth (Removed)   Tube Status Unclamped;Low intermittent suction 03/23/24 1200   Placement Verification X-ray 03/22/24 2000   Distal Tube Measurement 62 cm 03/22/24 2000   Site Assessment Clean;Dry;Intact 03/22/24 2000   Irrigant Tap water 03/22/24 2000   Response To Intervention No resistance met 03/22/24 2000   Output (mL) 100 mL 03/23/24 1700       [REMOVED] NG/OG/Feeding Tube OG - [place] sump (Removed)   Tube Status Low intermittent suction 03/27/24 0700   Placement Verification X-ray 03/27/24 0700   Distal Tube Measurement 65 cm 03/27/24 0700    Site Assessment Clean;Dry;Intact 03/27/24 0700   Drainage Appearance Brown 03/27/24 0700   NG/OG Interventions Irrigated 03/27/24 0700   Irrigant Tap water 03/27/24 0700   Response To Intervention No resistance met 03/27/24 0700   Intake (mL) 0 mL 03/26/24 2000   Intake - Flush (mL) 30 mL 03/24/24 1600   Output (mL) 0 mL 03/26/24 2000       [REMOVED] NG/OG/Feeding Tube OG - [place] sump Right mouth (Removed)   Tube Status Continuous tube feeding 03/31/24 2000   Placement Verification X-ray 03/31/24 2000   Distal Tube Measurement 55 cm 03/31/24 2000   Site Assessment Clean;Dry;Intact 04/01/24 1741   Drainage Appearance Brown 03/30/24 1600   NG/OG Interventions Irrigated 03/30/24 2000   Irrigant Tap water 03/30/24 2000   Response To Intervention No resistance met 03/30/24 2000   Tube Feeding Frequency Continuous 03/31/24 2000   Tube Feeding Pivot 1.5 03/31/24 2000   Tube Feeding Strength Full strength 03/31/24 2000   Tube Feeding Method Continuous per pump 03/31/24 2000   Tube Feeding Rate (ml/hr) 45 mL/hr 03/31/24 2000   Tube Feeding Bag Changed Yes 03/31/24 0500   Feeding Tube Flushed With Tap water 03/31/24 2000   Intake (mL) 0 mL 04/01/24 1600   Intake - Flush (mL) 30 mL 03/31/24 1800   Output (mL) 45 mL 03/29/24 1300       [REMOVED] Rectal Tube With balloon (Removed)   Rectal Tube I/O Intake/Irrgation and Output 03/14/24 0630   Rectal Tube Intake (mL) 60 mL 03/14/24 0630   Rectal Tube Output 250 mL 03/14/24 2059       [REMOVED] Rectal Tube (Removed)   Rectal Tube I/O Output only 04/06/24 2100   Rectal Tube Output 0 mL 04/06/24 2100       [REMOVED] Rectal Tube (Removed)   Rectal Tube I/O Intake/Irrgation and Output 04/09/24 2000   Rectal Tube Output 0 mL 04/09/24 1900       [REMOVED] Rectal Tube With balloon (Removed)   Rectal Tube I/O Output only 04/17/24 1600   Rectal Tube Intake (mL) 45 mL 04/17/24 0544   Rectal Tube Output 0 mL 04/17/24 1600       [REMOVED] Urethral Catheter Non-latex;Straight-tip 16 Fr.  (Removed)   Site Assessment Clean;Skin intact 03/06/24 1042   Collection Container Standard drainage bag 03/06/24 1042   Securement Method Securing device (Describe) 03/06/24 1042   Reason for Continuing Urinary Catheterization surgical procedures: urological/gynecological, pelvic oncology, anal, prolonged surgical procedure 03/06/24 1042   Output (mL) 250 mL 03/06/24 0927       [REMOVED] Urethral Catheter Temperature probe (Removed)   Site Assessment Clean;Skin intact 03/24/24 0800   Collection Container Standard drainage bag 03/24/24 0800   Securement Method Securing device (Describe) 03/24/24 0800   Reason for Continuing Urinary Catheterization accurate hourly measurement of urine volume in a critically ill patient that cannot be assessed by other volumes and urine collection strategies 03/23/24 2000   Output (mL) 0 mL 03/24/24 0600       [REMOVED] External Urinary Catheter Male (Removed)   Collection Container Standard drainage bag 03/18/24 2142   Securement Method Tape 03/18/24 2142   Output (mL) 650 mL 03/20/24 0859       Tourniquet Times: n/a        Implants: n/a    Findings: Greater curvature of the stomach necrotic >50%, with multiple perforations. Release of compartment syndrome. All of the colon and all of the small bowel except the first 66cm frankly necrotic. Proximal 66cm of small bowel with ischemic changes with small patchy confluent necrotic areas not susceptible to resection and anastomosis.     Indications: Jason Hollins is an 63 y.o. male who is having surgery for sepsis, rising lactate.    The patient was seen in the preoperative area. The risks, benefits, complications, treatment options, non-operative alternatives, expected recovery and outcomes were discussed with the patient. The possibilities of reaction to medication, pulmonary aspiration, injury to surrounding structures, bleeding, recurrent infection, the need for additional procedures, failure to diagnose a condition, and creating a  "complication requiring transfusion or operation were discussed with the patient. The patient concurred with the proposed plan, giving informed consent.  The site of surgery was properly noted/marked if necessary per policy. The patient has been actively warmed in preoperative area. Preoperative antibiotics are not indicated. Venous thrombosis prophylaxis have been ordered including bilateral sequential compression devices    Procedure Details: Appropriate consent was obtained.  The patient was taken to the operating room and a \"timeout\" was done verifying the patient's name, MRN, date of birth, procedure, position, allergies, antibiotics, need for special equipment, amongst other information.  This being done general anesthesia was induced by the anesthesia team without issue.  The patient was prepped and draped in the standard sterile fashion.  Before beginning the procedure another \"timeout\" was done and verified as correct.    We began the procedure with a generous laparotomy incision entering the abdominal cavity without injury to the underlying structures.  Immediately we encountered liters of purulent, bilious ascitic fluid and approximately 6 to 7 L of ascitic fluid were suctioned out. We explored the abdominal cavity and found purulent ascites with fibrinous infected staining on some of the bowel.  We ran the bowel in its entirety and discovered that the first 66 cm of small bowel past the ligament of Treitz were all ischemic with patchy small confluent areas of necrosis.  There did not seem to be enough salvageable small bowel in this area to perform an anastomosis or resection.  The distal aspect of the small bowel as well as the entire intra-abdominal portion of the colon were frankly necrotic.  We looked and there did not seem to be a perforation throughout the bowel.  We divided the gastrocolic ligament and entered into the lesser sac using the LigaSure.  We noted porter signs of coagulopathy and " controlled bleeding with the LigaSure as well as figure-of-eight 3-0 Vicryl sutures.  And entering the lesser sac it was discovered that the patient's greater curvature had multiple perforations, and in fact greater than 50% of the greater curvature was frankly necrotic.  We began to dissect in order to perform an antrectomy however at this time I felt like I had enough information to depend say the patient's condition was not survivable.  One of my partners per my request entered into the operative room and surveyed the abdomen and agreed with my assessment.    I briefly scrubbed out of the procedure and had a porter discussion with the family, joined with the surgical ICU attending, detailing my operative findings and that this was not survivable. The patient's family was understanding and voiced the wish that the patient be returned to the operating room and eventually be made comfort care once family had a chance to assemble.  I discussed with the family my decision to just perform enough of the operation in order to return to the ICU and this was agreed upon by them.    When I returned to the operating room I ceased further therapeutic operative interventions that the team was engaging in as they were gaining hemostasis and performing the first stomach division of the antrectomy at D1.  Hemostasis was achieved.  The first count was correct.  The fascia was closed in a running fashion with #1 PDS looped suture.  The final count was correct.  An ABThera wound VAC was placed in the subcutaneous tissue.  The patient returned to the surgical intensive care unit and unstable, critical condition.  I was present and scrubbed for all key portions of the procedure.  There were no immediate complications.  This was a class IV case.  Complications:  None; patient tolerated the procedure well.    Disposition: ICU - intubated and critically ill.  Condition: unstable         Additional Details: Intra-abdominal findings  incompatible with life as detailed above.     Attending Attestation: I was present and scrubbed for the key portions of the procedure.    Michael Cabrera  Phone Number: [phone]

## 2024-05-31 NOTE — DISCHARGE SUMMARY
Discharge Diagnosis  Soft tissue sarcoma (Multi)    Issues Requiring Follow-Up  none    Test Results Pending At Discharge  Pending Labs       No current pending labs.            Hospital Course  BRIEF HISTORY:    Jason Hollins is a 63 year old male with PMH significant for DM type 2, HPL, HTN, and gluteal myxoid chondrosarcoma s/p radiation. He was taken to the OR on 3/5/2024 for wide resection of gluteal sarcoma, sciatic nerve neurolysis with Dr. Hawkins of Orthopedics followed by right gluteal reconstruction via pedicle ALT, vastus lateralus flap, pedicle gluteal and perisformis flap, pelvic closure, and incisional wound vac placement with Dr. Smith of Plastic Surgery.    HOSPITAL COURSE:    Jason Hollins is a 63 y.o. 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 Hawkins and Dr. Smith on 3/5/24.  Post operative course was uncomplicated and patient was on RNF until 3/20 for prolonged bed rest to avoid hip flexion to maintain flap integrity.  Patient was on prophylactic lovenox while on bedrest.      3/20: Cardiopulmonary arrest s/p CPR with ROSC after TPA, overnight started on heparin, inhaled epo, increased pressor requirements, increased swelling at flap site.      3/21: Hemorrhagic shock, MTP. Firm and large right flap site. Return to OR s/p Exploration of right thigh wound, Evacuation of hematoma. Suture ligation of multiple bleeding vessel and several points in gluteal area.      4/1: s/p Trach     4/4: return OR with ENT s/p laryngoscopy, esophagoscopy and bronchoscopy, trach revision. Found extensive clot burden in esophagus and stomach as well as in the lungs. Oozing at thyroid bed cauterized. Trach exchanged to new 6.0 prox XLT shiley. OR findings: blood up glottis and from thyroid bed to airway.     4/18: Patient  bleeding into RLE given increased ANDERSON drain output and non-incrementing Hgb despite transfusion. Return to OR for washout.      : patient more somnolent, increasing WBC, Temp 99.7, Lactate 2.9 and increasing pressor requirements: pan-cultured, resumed vancomycin, reengaged ID, and returned hydrocortisone dosing to q6hr, transfused PRBC     : Developed increasing pressure req, acidosis, elevated lactate, pneumatosis intestinalis with gastric perforation. Ex lap was indicated; dead tissue in colon and large part of small intestines. Fatal prognosis was discussed with family. Patient made DNR and treatment was temporarily continued for family visitation. Patient declared dead at 1747 following cessation of life sustaining measures.    CONSULTATIONS:  Endocrinology consulted for diabetic management.   ID consulted for numerous infectious queries    DAY OF DISCHARGE:    Patient  as a result of end-of-life discussion on 24. Please see significant event pertaining to death note.    Pertinent Physical Exam At Time of Discharge  24 1747 . Of note, patient was made DNR comfort care with decision by family to withdraw supportive care.     Upon arrival at bedside, patient rhythm was noted to be asystole. Flat arterial line tracing was visible. Patient was examined with family at bedside. Dr. Moreno (SICU attending) was aware and at bedside. Upon auscultation, no heart or lung sounds were noted. Patient did not respond to noxious stimulus or have palpable pulse. Pupils were examined and were fixed and dilated. Time of death was pronounced at 1747. Family was at bedside. Surgical team was notified.     Home Medications     Medication List      ASK your doctor about these medications     aspirin 81 mg EC tablet   atorvastatin 40 mg tablet; Commonly known as: Lipitor; Take 1 tablet (40   mg) by mouth once daily.   Blood Glucose Test strip; Generic drug: blood sugar diagnostic   chlorhexidine 4 % external  liquid; Commonly known as: Hibiclens; Apply   topically 2 times a day for 5 days.; Ask about: Should I take this   medication?   fish oil concentrate 120-180 mg capsule; Commonly known as: Omega-3   multivitamin tablet       Outpatient Follow-Up  No future appointments.    Krunal Moreno MD
